# Patient Record
Sex: MALE | Race: WHITE | NOT HISPANIC OR LATINO | Employment: FULL TIME | URBAN - METROPOLITAN AREA
[De-identification: names, ages, dates, MRNs, and addresses within clinical notes are randomized per-mention and may not be internally consistent; named-entity substitution may affect disease eponyms.]

---

## 2017-12-29 PROBLEM — Z12.11 SCREEN FOR COLON CANCER: Status: ACTIVE | Noted: 2017-12-29

## 2018-10-08 PROBLEM — Z00.00 HEALTHCARE MAINTENANCE: Status: ACTIVE | Noted: 2018-10-08

## 2019-01-07 PROBLEM — Z00.00 HEALTHCARE MAINTENANCE: Status: RESOLVED | Noted: 2018-10-08 | Resolved: 2019-01-07

## 2019-04-01 ENCOUNTER — PATIENT OUTREACH (OUTPATIENT)
Dept: ADMINISTRATIVE | Facility: HOSPITAL | Age: 64
End: 2019-04-01

## 2020-04-19 ENCOUNTER — HISTORICAL (OUTPATIENT)
Dept: ADMINISTRATIVE | Facility: HOSPITAL | Age: 65
End: 2020-04-19

## 2020-04-19 LAB
ADENOVIRUS: NOT DETECTED
BORDETELLA PARAPERTUSSIS (IS1001): NOT DETECTED
CHLAMYDIA PNEUMONIAE: NOT DETECTED
CORONAVIRUS 229E, COMMON COLD VIRUS: NOT DETECTED
CORONAVIRUS HKU1, COMMON COLD VIRUS: NOT DETECTED
CORONAVIRUS NL63, COMMON COLD VIRUS: NOT DETECTED
CORONAVIRUS OC43, COMMON COLD VIRUS: NOT DETECTED
FLUBV RNA NPH QL NAA+NON-PROBE: NOT DETECTED
HPIV1 RNA NPH QL NAA+NON-PROBE: NOT DETECTED
HPIV2 RNA NPH QL NAA+NON-PROBE: NOT DETECTED
HPIV3 RNA NPH QL NAA+NON-PROBE: NOT DETECTED
HPIV4 RNA NPH QL NAA+NON-PROBE: NOT DETECTED
HUMAN METAPNEUMOVIRUS: NOT DETECTED
INFLUENZA A, MOLECULAR: NOT DETECTED
MYCOPLASMA PNEUMONIAE: NOT DETECTED
RESPIRATORY PANEL CONTROL: NORMAL
RSV RNA NPH QL NAA+NON-PROBE: NOT DETECTED
RV+EV RNA NPH QL NAA+NON-PROBE: NOT DETECTED

## 2020-04-21 LAB — SARS-COV-2 RNA RESP QL NAA+PROBE: NOT DETECTED

## 2021-05-06 ENCOUNTER — PATIENT MESSAGE (OUTPATIENT)
Dept: RESEARCH | Facility: HOSPITAL | Age: 66
End: 2021-05-06

## 2021-07-01 ENCOUNTER — PATIENT MESSAGE (OUTPATIENT)
Dept: ADMINISTRATIVE | Facility: OTHER | Age: 66
End: 2021-07-01

## 2022-02-10 ENCOUNTER — PATIENT MESSAGE (OUTPATIENT)
Dept: ADMINISTRATIVE | Facility: HOSPITAL | Age: 67
End: 2022-02-10

## 2022-06-15 PROBLEM — D64.89 OTHER SPECIFIED ANEMIAS: Status: ACTIVE | Noted: 2022-06-15

## 2022-06-15 PROBLEM — I49.3 PVC'S (PREMATURE VENTRICULAR CONTRACTIONS): Status: ACTIVE | Noted: 2022-06-15

## 2022-06-15 PROBLEM — E80.6 HYPERBILIRUBINEMIA: Status: ACTIVE | Noted: 2022-06-15

## 2022-06-15 PROBLEM — E88.09 HYPOALBUMINEMIA: Status: ACTIVE | Noted: 2022-06-15

## 2022-06-15 PROBLEM — Z87.898 HISTORY OF PALPITATIONS: Status: ACTIVE | Noted: 2022-06-15

## 2022-06-15 PROBLEM — D68.9 COAGULOPATHY: Status: ACTIVE | Noted: 2022-06-15

## 2022-09-30 ENCOUNTER — TELEPHONE (OUTPATIENT)
Dept: TRANSPLANT | Facility: CLINIC | Age: 67
End: 2022-09-30
Payer: COMMERCIAL

## 2022-09-30 NOTE — TELEPHONE ENCOUNTER
Referral received from Aruna DUDLEY     Patient with Alcohol . MELD 23  ICD-10:  k74.60  Referred for liver transplant for CONSULT     Referral completed and forwarded to Transplant Financial Services.        Insurance: Epic   PRIMARY:   ID:  Contact #     SECONDARY:   ID:  Contact #

## 2022-09-30 NOTE — TELEPHONE ENCOUNTER
----- Message from OCTAVIA Canseco sent at 9/30/2022  8:54 AM CDT -----  Good Morning Genie,     I need to try to get this gentleman in with transplant ASAP. Labs drawn yesterday showed a MELD of 23. I spoke to him this morning and he agrees to be evaluated. Thanks for your help and I hope you are enjoying this beautiful weather!!    Concepcion Miranda PA-C

## 2022-09-30 NOTE — LETTER
September 30, 2022    Juan Carlos Yoo  [address]  [facility] [address]          Dear Juan Carlos Yoo:    Your doctor has referred you to the [facility] for liver transplant consideration.  Your demographic and insurance information have been forwarded to our financial counselor for insurance clearance.  You will be contacted by our office once your insurance company has approved a transplant consult and/or evaluation for you. An initial appointment will then be scheduled for you.     We look forward to seeing you soon. If you have any further questions, please contact us at [phone].       Sincerely,        [facility]   [address] [place] [id number] [phone]

## 2022-09-30 NOTE — TELEPHONE ENCOUNTER
Call placed to pt and spouse. No answer on pt numbers, unable to lvm. LVM on spouses phone. Requested call back re appts.   Letter to be mailed.

## 2022-10-06 ENCOUNTER — TELEPHONE (OUTPATIENT)
Dept: TRANSPLANT | Facility: CLINIC | Age: 67
End: 2022-10-06
Payer: COMMERCIAL

## 2022-10-06 NOTE — TELEPHONE ENCOUNTER
----- Message from Meche Escamilla sent at 10/6/2022  1:04 PM CDT -----      Called pt to bishnu an appt, but there was no answer and unable to LVM, mail box is full.    Called pt So, Vivian, at [phone], but there was no answer.     .

## 2022-10-13 ENCOUNTER — TELEPHONE (OUTPATIENT)
Dept: TRANSPLANT | Facility: CLINIC | Age: 67
End: 2022-10-13
Payer: COMMERCIAL

## 2022-10-13 NOTE — TELEPHONE ENCOUNTER
----- Message from Meche Escamilla sent at 10/13/2022 10:42 AM CDT -----    Called and sp to pt; appt bishnu'ed for 10/17.  .

## 2022-10-16 DIAGNOSIS — Z76.82 ORGAN TRANSPLANT CANDIDATE: ICD-10-CM

## 2022-10-16 DIAGNOSIS — K74.60 HEPATIC CIRRHOSIS, UNSPECIFIED HEPATIC CIRRHOSIS TYPE, UNSPECIFIED WHETHER ASCITES PRESENT: Primary | ICD-10-CM

## 2022-10-16 NOTE — PROGRESS NOTES
[facility] Consultation Note    Reason for Consult:  Diagnoses of Alcoholic cirrhosis of liver without ascites, Hepatic cirrhosis, unspecified hepatic cirrhosis type, unspecified whether ascites present, and Organ transplant candidate were pertinent to this visit.    PCP: Nyla Rios   [address] / [place] [address]    HPI:  This is a 67 y.o. male here for evaluation of: liver transplant referral for decomp cirrhosis with fatigue, hepatic encephalopathy, thrombocytopenia, eso varices wo bleeding.  EGD and colonoscopy were supposed to be done 2 months ago, he was prepped, and anesthesia did not want to go forward, he was sent to ECHO: A Fib cardioverted 3 times did not stop the arrhythmia, but in the ER he got a shot, and heart converted to sins rhythm, no more A Fib since.     ECHO 6/15/22:   The left ventricle is normal in size with normal systolic function.  The estimated ejection fraction is 55-60%.  The quantitatively derived ejection fraction is 56%.  A diastolic pattern consistent with atrial fibrillation observed.  Severe left atrial enlargement.  Mild right ventricular enlargement with normal right ventricular systolic function.  Severe right atrial enlargement.  Mild aortic regurgitation.  Mild mitral regurgitation.  The estimated PA systolic pressure is 53 mmHg.  There is moderate pulmonary hypertension.  Mild tricuspid regurgitation.  Intermediate central venous pressure (8 mmHg).       OCTAVIA Miranda note 10/6/22:  He has mentioned drinking heavily several weeks before the blood work due to rectal pressure, but he needs to be followed by transplant at this point due to the severity of his liver disease.     MELD was 23 on 9/29/22.  17 today.      MELD-Na score: 17 at 10/17/2022  2:30 PM  MELD score: 16 at 10/17/2022  2:30 PM  Calculated from:  Serum Creatinine: 1.2 mg/dL at 10/17/2022  2:30 PM  Serum Sodium: 136 mmol/L at 10/17/2022  2:30 PM  Total Bilirubin: 2.7 mg/dL at  10/17/2022  2:30 PM  INR(ratio): 1.4 at 10/17/2022  2:30 PM  Age: 67 years     US abd 7/1/22:  The liver is normal in size measuring 14.2 cm in length. The liver is heterogeneous in echogenicity and nodular in contour compatible with cirrhosis.  No focal hepatic lesions are identified.  The main portal vein appears patent with antegrade flow.  No intrahepatic biliary ductal dilatation is detected. The common bile duct is within normal limits at 0.4 cm.  The gallbladder is absent.  The right kidney is normal in size measuring 12.2 cm with no evidence of hydronephrosis.  A possible 0.7 cm nonobstructing right renal calculus is noted in the superior pole.  A 0.9 cm cyst is noted in the superior pole of the right kidney.  The spleen is enlarged measuring 13.1 x 5.5 cm.     Impression:   1. Heterogeneous nodular liver compatible with cirrhosis.  2. Cholecystectomy.  3. Splenomegaly.  4. Possible 0.7 cm nonobstructing right renal calculus.  5. Right renal cyst.      Elevated liver enzymes: Yes  Abnormal imaging: Yes  Cirrhosis: Yes  Hepatitis C: No  Hepatitis B: No  Fatty liver: No  Encephalopathy: Yes  Post-hospital discharge: No  Symptoms: Mild swelling in the legs.      Primary hepatic manifestations:  Fatigue:Yes  Edema:Yes  Ascites:Yes - para x 2 in the last year.  Encephalopathy:Yes  Abdominal pain:No  GI bleeds: No  Pruritus:No  Weight Changes:No  Changes in Bowel habits: No  Muscle cramps:No    Risk factors for liver disease:  No jaundice  No transfusions  No IVDU  Did not snort cocaine or similar agents  Did not live with anyone with hepatitis B or C  Sexual partner not tested  No hepatotoxic medications  No exposure to industrial toxins  Alcohol:       ROS:  Constitutional: No fevers, chills, weight changes, fatigue  ENT: No allergies, nosebleeds,   CV: No chest pain  Pulm: No cough, shortness of breath  Ophtho: No vision changes  GI/Liver: see HPI  Derm: No rash, itching  Heme: No swollen glands, bruising  MSK:  No joint pains, joint swelling  : No dysuria, hematuria, decrease in urine output  Endo: No hot or cold intolerance  Neuro: No confusion, disorientation, difficulty with sleep, memory, concentration, syncope, seizure  Psych: No anxiety, depression    Medical History:  has a past medical history of Bulging disc, Cirrhosis, Colon polyp (12/29/2017), EV (esophageal varices), Skin cancer (03/2017), and Thrombocytopenia.    Surgical History:  has a past surgical history that includes Cholecystectomy; Tonsillectomy; Vasectomy; Skin biopsy (03/2017); Hernia repair; Upper gastrointestinal endoscopy (2011); Upper gastrointestinal endoscopy (2016); Colonoscopy; and Colonoscopy (N/A, 12/29/2017).    Family History: family history includes Cancer in his maternal uncle; Heart disease in his maternal uncle; Hyperlipidemia in his brother; Ovarian cancer in his sister..     Social History:  reports that he has never smoked. He has never used smokeless tobacco. He reports that he does not currently use alcohol. He reports that he does not use drugs.    Review of patient's allergies indicates:  No Known Allergies    Current Outpatient Rx   Medication Sig Dispense Refill    ascorbic acid, vitamin C, (VITAMIN C) 500 MG tablet Take 500 mg by mouth.      cyanocobalamin (VITAMIN B-12) 100 MCG tablet Take 100 mcg by mouth once daily.      fish oil-omega-3 fatty acids 300-1,000 mg capsule Take 1 g by mouth 2 (two) times daily.      furosemide (LASIX) 40 MG tablet Take 1 tablet (40 mg total) by mouth 2 (two) times a day. 30 tablet 6    nadoloL (CORGARD) 20 MG tablet Take 1 tablet (20 mg total) by mouth every evening. 90 tablet 2    pantoprazole (PROTONIX) 40 MG tablet Take 1 tablet (40 mg total) by mouth once daily. 90 tablet 0    spironolactone (ALDACTONE) 50 MG tablet Take 1 tablet (50 mg total) by mouth once daily. 90 tablet 3    tamsulosin (FLOMAX) 0.4 mg Cap Take 1 capsule (0.4 mg total) by mouth every evening. 30 capsule 6  "      Objective Findings:    Vital Signs:  /67 (BP Location: Right arm, Patient Position: Sitting, BP Method: Medium (Automatic))   Pulse 85   Temp 97.2 °F (36.2 °C) (Tympanic)   Resp 16   Ht 6' 1" (1.854 m)   Wt 121.8 kg (268 lb 8.3 oz)   SpO2 98%   BMI 35.43 kg/m²   Body mass index is 35.43 kg/m².    Physical Exam:  General Appearance: Well appearing in no acute distress  Head:   Normocephalic, without obvious abnormality  Eyes:    No scleral icterus, EOMI  ENT: Neck supple, Lips, mucosa, and tongue normal; teeth and gums normal  Lungs: CTA bilaterally in anterior and posterior fields, no wheezes, no crackles.  Heart:  Regular rate and rhythm, S1, S2 normal, no murmurs heard  Abdomen: Soft, non tender, non distended with positive bowel sounds in all four quadrants. No hepatosplenomegaly, ascites, or mass  Extremities: 2+ pulses, no clubbing, cyanosis or edema  Skin: No rash  Neurologic: CN II-XII intact, alert, oriented x 3. No asterixis      Labs:  Lab Results   Component Value Date    WBC 5.32 10/17/2022    HGB 12.1 (L) 10/17/2022    HCT 37.4 (L) 10/17/2022     (L) 10/17/2022    CHOL 178 04/01/2022    TRIG 73 04/01/2022    HDL 22 (L) 04/01/2022    INR 1.4 (H) 10/17/2022    INR 1.4 (H) 10/17/2022    CREATININE 1.2 10/17/2022    BUN 15 10/17/2022    BILITOT 2.7 (H) 10/17/2022    ALT 17 10/17/2022    AST 41 (H) 10/17/2022    ALKPHOS 60 10/17/2022     10/17/2022    K 3.6 10/17/2022    CL 96 10/17/2022    CO2 34 (H) 10/17/2022    PSA 0.18 09/29/2022    HGBA1C 4.9 10/01/2018    AFP 5.7 10/17/2022       Imaging:       Endoscopy:      Assessment:  1. Alcoholic cirrhosis of liver without ascites    2. Hepatic cirrhosis, unspecified hepatic cirrhosis type, unspecified whether ascites present    3. Organ transplant candidate    Decomp. cirrhosis, with HE, ascites (controlled), edema (better now), mild icterus, moderate Pulm Hypertension, Drank since he was in high school, but had fallen out of bed " in Janury 2023, had severe abd pain, and had passed out when he was taken to [facility], [place].  Stopped drinking alcohol after the fall, then drank 4 weeks ago (mid- Sept 2022) for 2 weeks.  Has not had any alcohol for 2 weeks.  MELD was 23 on 9/29/22.  Improving with MELD 17 today 10/17/22.   AFP normal. Creat normal.      Recommendations:  -  Labs every 3 months, starting :  CBC, CMP, PT INR  -  Ultrasound of abdomen and AFP every 6 months, next due in 1/2023   -  Cardiology appt (heart failure) for pulm hypertension.   -  Low salt in the diet, avoid canned, bottled and processed foods.  -  Continue current meds  -  Avoid alcohol, smoking, sedatives and meds with codeine.  -  Use sliding scale for lactulose as follows:  Take 1 cup 30 mL at 8AM.  If no stool by 12 noon, take 30 mL more of lactulose.  If <2 stools by 3 PM, take 30 mL more of lactulose.  Goal is 3-4 soft stools daily.   -  Avoid high intake of Tylenol (more than 4 extra-strength pills in one day)  -  Call us if any bleeding, fevers, confusion, disorientation occur  -  Endoscopy: to be done here in GI.   -  Transplant option discussed, will evaluate when MELD >15.  -  Return in 2 months.       Follow up in about 2 months (around 12/17/2022).      Order summary:  Orders Placed This Encounter   Procedures    US Abdomen Limited    AFP Tumor Marker    CBC Auto Differential    Comprehensive Metabolic Panel    Protime-INR         Thank you so much for allowing me to participate in the care of Juan Carlos Coat MD

## 2022-10-17 ENCOUNTER — OFFICE VISIT (OUTPATIENT)
Dept: TRANSPLANT | Facility: CLINIC | Age: 67
End: 2022-10-17
Payer: COMMERCIAL

## 2022-10-17 ENCOUNTER — LAB VISIT (OUTPATIENT)
Dept: LAB | Facility: HOSPITAL | Age: 67
End: 2022-10-17
Payer: COMMERCIAL

## 2022-10-17 VITALS
BODY MASS INDEX: 35.59 KG/M2 | OXYGEN SATURATION: 98 % | DIASTOLIC BLOOD PRESSURE: 67 MMHG | RESPIRATION RATE: 16 BRPM | TEMPERATURE: 97 F | HEART RATE: 85 BPM | HEIGHT: 73 IN | WEIGHT: 268.5 LBS | SYSTOLIC BLOOD PRESSURE: 130 MMHG

## 2022-10-17 DIAGNOSIS — Z76.82 ORGAN TRANSPLANT CANDIDATE: ICD-10-CM

## 2022-10-17 DIAGNOSIS — E88.09 HYPOALBUMINEMIA: ICD-10-CM

## 2022-10-17 DIAGNOSIS — I49.3 PVC'S (PREMATURE VENTRICULAR CONTRACTIONS): ICD-10-CM

## 2022-10-17 DIAGNOSIS — E80.6 HYPERBILIRUBINEMIA: ICD-10-CM

## 2022-10-17 DIAGNOSIS — K74.60 HEPATIC CIRRHOSIS, UNSPECIFIED HEPATIC CIRRHOSIS TYPE, UNSPECIFIED WHETHER ASCITES PRESENT: ICD-10-CM

## 2022-10-17 DIAGNOSIS — D68.9 COAGULOPATHY: ICD-10-CM

## 2022-10-17 DIAGNOSIS — R06.09 DOE (DYSPNEA ON EXERTION): ICD-10-CM

## 2022-10-17 DIAGNOSIS — K70.30 ALCOHOLIC CIRRHOSIS OF LIVER WITHOUT ASCITES: ICD-10-CM

## 2022-10-17 DIAGNOSIS — M79.89 LEG SWELLING: ICD-10-CM

## 2022-10-17 DIAGNOSIS — Z87.898 HISTORY OF PALPITATIONS: ICD-10-CM

## 2022-10-17 LAB
ABO + RH BLD: NORMAL
AFP SERPL-MCNC: 5.7 NG/ML (ref 0–8.4)
ALBUMIN SERPL BCP-MCNC: 2.7 G/DL (ref 3.5–5.2)
ALP SERPL-CCNC: 60 U/L (ref 55–135)
ALT SERPL W/O P-5'-P-CCNC: 17 U/L (ref 10–44)
AMPHET+METHAMPHET UR QL: NEGATIVE
ANION GAP SERPL CALC-SCNC: 6 MMOL/L (ref 8–16)
AST SERPL-CCNC: 41 U/L (ref 10–40)
BARBITURATES UR QL SCN>200 NG/ML: NEGATIVE
BASOPHILS # BLD AUTO: 0.05 K/UL (ref 0–0.2)
BASOPHILS NFR BLD: 0.9 % (ref 0–1.9)
BENZODIAZ UR QL SCN>200 NG/ML: NEGATIVE
BILIRUB DIRECT SERPL-MCNC: 1.4 MG/DL (ref 0.1–0.3)
BILIRUB SERPL-MCNC: 2.7 MG/DL (ref 0.1–1)
BILIRUB UR QL STRIP: NEGATIVE
BLD GP AB SCN CELLS X3 SERPL QL: NORMAL
BUN SERPL-MCNC: 15 MG/DL (ref 8–23)
BZE UR QL SCN: NEGATIVE
CALCIUM SERPL-MCNC: 8.4 MG/DL (ref 8.7–10.5)
CANNABINOIDS UR QL SCN: NEGATIVE
CHLORIDE SERPL-SCNC: 96 MMOL/L (ref 95–110)
CLARITY UR REFRACT.AUTO: CLEAR
CO2 SERPL-SCNC: 34 MMOL/L (ref 23–29)
COLOR UR AUTO: YELLOW
CREAT SERPL-MCNC: 1.2 MG/DL (ref 0.5–1.4)
CREAT UR-MCNC: 52 MG/DL (ref 23–375)
DIFFERENTIAL METHOD: ABNORMAL
EOSINOPHIL # BLD AUTO: 0.2 K/UL (ref 0–0.5)
EOSINOPHIL NFR BLD: 3 % (ref 0–8)
ERYTHROCYTE [DISTWIDTH] IN BLOOD BY AUTOMATED COUNT: 14.8 % (ref 11.5–14.5)
EST. GFR  (NO RACE VARIABLE): >60 ML/MIN/1.73 M^2
ETHANOL UR-MCNC: <10 MG/DL
GGT SERPL-CCNC: 54 U/L (ref 8–55)
GLUCOSE SERPL-MCNC: 94 MG/DL (ref 70–110)
GLUCOSE UR QL STRIP: NEGATIVE
HAV IGG SER QL IA: REACTIVE
HBV CORE AB SERPL QL IA: NORMAL
HBV SURFACE AG SERPL QL IA: NORMAL
HCT VFR BLD AUTO: 37.4 % (ref 40–54)
HCV AB SERPL QL IA: NORMAL
HGB BLD-MCNC: 12.1 G/DL (ref 14–18)
HGB UR QL STRIP: NEGATIVE
IMM GRANULOCYTES # BLD AUTO: 0.03 K/UL (ref 0–0.04)
IMM GRANULOCYTES NFR BLD AUTO: 0.6 % (ref 0–0.5)
INR PPP: 1.4 (ref 0.8–1.2)
INR PPP: 1.4 (ref 0.8–1.2)
KETONES UR QL STRIP: NEGATIVE
LEUKOCYTE ESTERASE UR QL STRIP: NEGATIVE
LYMPHOCYTES # BLD AUTO: 1.4 K/UL (ref 1–4.8)
LYMPHOCYTES NFR BLD: 26.3 % (ref 18–48)
MCH RBC QN AUTO: 33.6 PG (ref 27–31)
MCHC RBC AUTO-ENTMCNC: 32.4 G/DL (ref 32–36)
MCV RBC AUTO: 104 FL (ref 82–98)
METHADONE UR QL SCN>300 NG/ML: NEGATIVE
MICROSCOPIC COMMENT: NORMAL
MONOCYTES # BLD AUTO: 0.8 K/UL (ref 0.3–1)
MONOCYTES NFR BLD: 15 % (ref 4–15)
NEUTROPHILS # BLD AUTO: 2.9 K/UL (ref 1.8–7.7)
NEUTROPHILS NFR BLD: 54.2 % (ref 38–73)
NITRITE UR QL STRIP: NEGATIVE
NRBC BLD-RTO: 0 /100 WBC
OPIATES UR QL SCN: NEGATIVE
PCP UR QL SCN>25 NG/ML: NEGATIVE
PH UR STRIP: 7 [PH] (ref 5–8)
PLATELET # BLD AUTO: 132 K/UL (ref 150–450)
PMV BLD AUTO: 11.2 FL (ref 9.2–12.9)
POTASSIUM SERPL-SCNC: 3.6 MMOL/L (ref 3.5–5.1)
PROT SERPL-MCNC: 7.3 G/DL (ref 6–8.4)
PROT UR QL STRIP: NEGATIVE
PROTHROMBIN TIME: 14 SEC (ref 9–12.5)
PROTHROMBIN TIME: 14 SEC (ref 9–12.5)
RBC # BLD AUTO: 3.6 M/UL (ref 4.6–6.2)
RBC #/AREA URNS AUTO: 0 /HPF (ref 0–4)
SODIUM SERPL-SCNC: 136 MMOL/L (ref 136–145)
SP GR UR STRIP: 1.01 (ref 1–1.03)
SQUAMOUS #/AREA URNS AUTO: 0 /HPF
TOXICOLOGY INFORMATION: NORMAL
URN SPEC COLLECT METH UR: NORMAL
WBC # BLD AUTO: 5.32 K/UL (ref 3.9–12.7)
WBC #/AREA URNS AUTO: 1 /HPF (ref 0–5)

## 2022-10-17 PROCEDURE — 3288F PR FALLS RISK ASSESSMENT DOCUMENTED: ICD-10-PCS | Mod: CPTII,S$GLB,TXP, | Performed by: INTERNAL MEDICINE

## 2022-10-17 PROCEDURE — 82105 ALPHA-FETOPROTEIN SERUM: CPT | Mod: TXP | Performed by: INTERNAL MEDICINE

## 2022-10-17 PROCEDURE — 80307 DRUG TEST PRSMV CHEM ANLYZR: CPT | Mod: TXP | Performed by: INTERNAL MEDICINE

## 2022-10-17 PROCEDURE — 82977 ASSAY OF GGT: CPT | Mod: TXP | Performed by: INTERNAL MEDICINE

## 2022-10-17 PROCEDURE — 3288F FALL RISK ASSESSMENT DOCD: CPT | Mod: CPTII,S$GLB,TXP, | Performed by: INTERNAL MEDICINE

## 2022-10-17 PROCEDURE — 87340 HEPATITIS B SURFACE AG IA: CPT | Mod: TXP | Performed by: INTERNAL MEDICINE

## 2022-10-17 PROCEDURE — 85610 PROTHROMBIN TIME: CPT | Mod: TXP | Performed by: INTERNAL MEDICINE

## 2022-10-17 PROCEDURE — 99205 OFFICE O/P NEW HI 60 MIN: CPT | Mod: S$GLB,TXP,, | Performed by: INTERNAL MEDICINE

## 2022-10-17 PROCEDURE — 81001 URINALYSIS AUTO W/SCOPE: CPT | Mod: TXP | Performed by: INTERNAL MEDICINE

## 2022-10-17 PROCEDURE — 1126F PR PAIN SEVERITY QUANTIFIED, NO PAIN PRESENT: ICD-10-PCS | Mod: CPTII,S$GLB,TXP, | Performed by: INTERNAL MEDICINE

## 2022-10-17 PROCEDURE — 3078F PR MOST RECENT DIASTOLIC BLOOD PRESSURE < 80 MM HG: ICD-10-PCS | Mod: CPTII,S$GLB,TXP, | Performed by: INTERNAL MEDICINE

## 2022-10-17 PROCEDURE — 80053 COMPREHEN METABOLIC PANEL: CPT | Mod: TXP | Performed by: INTERNAL MEDICINE

## 2022-10-17 PROCEDURE — 99999 PR PBB SHADOW E&M-EST. PATIENT-LVL IV: CPT | Mod: PBBFAC,TXP,, | Performed by: INTERNAL MEDICINE

## 2022-10-17 PROCEDURE — 86901 BLOOD TYPING SEROLOGIC RH(D): CPT | Mod: TXP | Performed by: INTERNAL MEDICINE

## 2022-10-17 PROCEDURE — 1101F PT FALLS ASSESS-DOCD LE1/YR: CPT | Mod: CPTII,S$GLB,TXP, | Performed by: INTERNAL MEDICINE

## 2022-10-17 PROCEDURE — 1159F PR MEDICATION LIST DOCUMENTED IN MEDICAL RECORD: ICD-10-PCS | Mod: CPTII,S$GLB,TXP, | Performed by: INTERNAL MEDICINE

## 2022-10-17 PROCEDURE — 1126F AMNT PAIN NOTED NONE PRSNT: CPT | Mod: CPTII,S$GLB,TXP, | Performed by: INTERNAL MEDICINE

## 2022-10-17 PROCEDURE — 85025 COMPLETE CBC W/AUTO DIFF WBC: CPT | Mod: TXP | Performed by: INTERNAL MEDICINE

## 2022-10-17 PROCEDURE — 80321 ALCOHOLS BIOMARKERS 1OR 2: CPT | Mod: TXP | Performed by: INTERNAL MEDICINE

## 2022-10-17 PROCEDURE — 99999 PR PBB SHADOW E&M-EST. PATIENT-LVL IV: ICD-10-PCS | Mod: PBBFAC,TXP,, | Performed by: INTERNAL MEDICINE

## 2022-10-17 PROCEDURE — 86803 HEPATITIS C AB TEST: CPT | Mod: TXP | Performed by: INTERNAL MEDICINE

## 2022-10-17 PROCEDURE — 1101F PR PT FALLS ASSESS DOC 0-1 FALLS W/OUT INJ PAST YR: ICD-10-PCS | Mod: CPTII,S$GLB,TXP, | Performed by: INTERNAL MEDICINE

## 2022-10-17 PROCEDURE — 82248 BILIRUBIN DIRECT: CPT | Mod: TXP | Performed by: INTERNAL MEDICINE

## 2022-10-17 PROCEDURE — 86790 VIRUS ANTIBODY NOS: CPT | Mod: TXP | Performed by: INTERNAL MEDICINE

## 2022-10-17 PROCEDURE — 99205 PR OFFICE/OUTPT VISIT, NEW, LEVL V, 60-74 MIN: ICD-10-PCS | Mod: S$GLB,TXP,, | Performed by: INTERNAL MEDICINE

## 2022-10-17 PROCEDURE — 3075F SYST BP GE 130 - 139MM HG: CPT | Mod: CPTII,S$GLB,TXP, | Performed by: INTERNAL MEDICINE

## 2022-10-17 PROCEDURE — 3075F PR MOST RECENT SYSTOLIC BLOOD PRESS GE 130-139MM HG: ICD-10-PCS | Mod: CPTII,S$GLB,TXP, | Performed by: INTERNAL MEDICINE

## 2022-10-17 PROCEDURE — 3078F DIAST BP <80 MM HG: CPT | Mod: CPTII,S$GLB,TXP, | Performed by: INTERNAL MEDICINE

## 2022-10-17 PROCEDURE — 86706 HEP B SURFACE ANTIBODY: CPT | Mod: 91,TXP | Performed by: INTERNAL MEDICINE

## 2022-10-17 PROCEDURE — 86704 HEP B CORE ANTIBODY TOTAL: CPT | Mod: TXP | Performed by: INTERNAL MEDICINE

## 2022-10-17 PROCEDURE — 1159F MED LIST DOCD IN RCRD: CPT | Mod: CPTII,S$GLB,TXP, | Performed by: INTERNAL MEDICINE

## 2022-10-17 PROCEDURE — 36415 COLL VENOUS BLD VENIPUNCTURE: CPT | Mod: TXP | Performed by: INTERNAL MEDICINE

## 2022-10-17 NOTE — Clinical Note
Recommendations: -  Labs every 3 months, starting :  CBC, CMP, PT INR -  Ultrasound of abdomen and AFP every 6 months, next due in 1/2023  -  Cardiology appt (heart failure) for pulm hypertension.  -  Low salt in the diet, avoid canned, bottled and processed foods. -  Continue current meds -  Avoid alcohol, smoking, sedatives and meds with codeine. -  Use sliding scale for lactulose as follows:  Take 1 cup 30 mL at 8AM.  If no stool by 12 noon, take 30 mL more of lactulose.  If <2 stools by 3 PM, take 30 mL more of lactulose.  Goal is 3-4 soft stools daily.  -  Avoid high intake of Tylenol (more than 4 extra-strength pills in one day) -  Call us if any bleeding, fevers, confusion, disorientation occur -  Endoscopy: to be done here in GI.  -  Transplant option discussed, will evaluate when MELD >15. -  Return in 2 months.

## 2022-10-17 NOTE — LETTER
October 17, 2022        Aruna Miranda  [address]  [place] [address]  Phone: [phone]  Fax: [phone]             [facility]  [address]  [facility] [address]  Phone: [phone]   Patient: Juan Carlos Yoo Sr.   MR Number: [id number]   YOB: 1955   Date of Visit: 10/17/2022       Dear Dr. Aruna Miranda    Thank you for referring Juan Carlos Yoo to me for evaluation. Attached you will find relevant portions of my assessment and plan of care.    If you have questions, please do not hesitate to call me. I look forward to following Juan Carlos Yoo along with you.    Sincerely,    Deanna Cota MD    Enclosure    If you would like to receive this communication electronically, please contact [email or web address] or [phone] to request Game[employer] Link access.    [employer] Link is a tool which provides read-only access to select patient information with whom you have a relationship. Its easy to use and provides real time access to review your patients record including encounter summaries, notes, results, and demographic information.    If you feel you have received this communication in error or would no longer like to receive these types of communications, please e-mail [email or web address]

## 2022-10-18 LAB
HBV SURFACE AB SER-ACNC: <3 MIU/ML
HBV SURFACE AB SER-ACNC: NORMAL M[IU]/ML

## 2022-10-21 LAB
CLINICAL BIOCHEMIST REVIEW: NORMAL
PETH 16:0/18.1 (POPETH): 60 NG/ML
PETH 16:0/18.2 (PLPETH): 14 NG/ML

## 2022-10-28 ENCOUNTER — TELEPHONE (OUTPATIENT)
Dept: TRANSPLANT | Facility: CLINIC | Age: 67
End: 2022-10-28
Payer: COMMERCIAL

## 2022-10-28 NOTE — TELEPHONE ENCOUNTER
Per Dr. Cota:    Transplant option discussed, will evaluate when MELD >15        10/17/2022   MELD-Na PELD 17       Please request financial clearance for outpatient liver transplant evaluation.  Will fax clinicals to [facility] .

## 2022-11-02 ENCOUNTER — TELEPHONE (OUTPATIENT)
Dept: TRANSPLANT | Facility: CLINIC | Age: 67
End: 2022-11-02
Payer: COMMERCIAL

## 2022-11-04 ENCOUNTER — TELEPHONE (OUTPATIENT)
Dept: TRANSPLANT | Facility: CLINIC | Age: 67
End: 2022-11-04
Payer: COMMERCIAL

## 2022-11-04 DIAGNOSIS — I27.20 PULMONARY HYPERTENSION: ICD-10-CM

## 2022-11-04 DIAGNOSIS — Z76.82 ORGAN TRANSPLANT CANDIDATE: Primary | ICD-10-CM

## 2022-11-04 NOTE — TELEPHONE ENCOUNTER
Called pt to discuss [facility] liver transplant evaluation.  Informed patient  that evaluation will take approx  2 to 3 days to complete, depending on rather additional consults.  Informed him that all testing will be performed outpatient.  Patient notified that some testing may be completed outside.  Patient informed of which tests that can be scheduled locally.  Patient voiced understanding of the need to have a caregiver present for the  and surgeon consult, as well as for the patient education.   All questions/ concerns regarding liver transplant evaluation answered/ addressed.     Patient picked December 1st and 2nd for liver transplant evaluation.  Patient needs cardiac clearance for EGD/Colonoscopy.   Patent has history of pulmonary HTN and A-fib.    Cardiology consult has been requested.

## 2022-11-04 NOTE — TELEPHONE ENCOUNTER
MELD 17   Patient has financial clearance for outpatient liver transplant evaluation.    Called the following numbers to reach patient to schedule liver transplant evaluation and discuss him needing to see cardiology for heart failure due to pulmonary HTN.  Unable to reach patient.     Contact Information   [phone] (Mobile)  Mailbox full - could not leave message  [phone] (Home Phone)   [phone] (Mobile) - Just rings and did not go to voicemail      Alternate     Vivian Henderson (Significant other)   [phone] (Mobile) -  Left message with callback number       Placed orders for heart failure / pulmonary HTN clinic. Appointment card given to transplant .   Left message on significant other's mobile phone for patient to call back.

## 2022-11-10 ENCOUNTER — TELEPHONE (OUTPATIENT)
Dept: TRANSPLANT | Facility: CLINIC | Age: 67
End: 2022-11-10
Payer: COMMERCIAL

## 2022-11-10 DIAGNOSIS — Z76.82 ORGAN TRANSPLANT CANDIDATE: Primary | ICD-10-CM

## 2022-11-10 DIAGNOSIS — I50.9 CONGESTIVE HEART FAILURE, UNSPECIFIED HF CHRONICITY, UNSPECIFIED HEART FAILURE TYPE: Primary | ICD-10-CM

## 2022-11-15 ENCOUNTER — TELEPHONE (OUTPATIENT)
Dept: TRANSPLANT | Facility: CLINIC | Age: 67
End: 2022-11-15
Payer: COMMERCIAL

## 2022-11-15 NOTE — TELEPHONE ENCOUNTER
----- Message from Meche Escamilla sent at 11/15/2022 12:53 PM CST -----  Regarding: FW: Scheduling Assistance    Returned call to pt, but there was no answer and unable to LVM. Pt is calling about [facility] appts for 12/1 and 12/2.  .  ----- Message -----  From: Jose Melara  Sent: 11/15/2022  12:44 PM CST  To: [facility] Pre-Liver Transplant Non-Clinical  Subject: Scheduling Assistance                            Patient called in to speak with staff in regards to scheduling some testing he was informed he needs. He mentions a series of testing needing to be scheduled.        Contact: [phone]

## 2022-11-22 DIAGNOSIS — Z76.82 ORGAN TRANSPLANT CANDIDATE: Primary | ICD-10-CM

## 2022-11-29 ENCOUNTER — LAB VISIT (OUTPATIENT)
Dept: LAB | Facility: HOSPITAL | Age: 67
End: 2022-11-29
Attending: INTERNAL MEDICINE
Payer: COMMERCIAL

## 2022-11-29 ENCOUNTER — OFFICE VISIT (OUTPATIENT)
Dept: TRANSPLANT | Facility: CLINIC | Age: 67
End: 2022-11-29
Payer: COMMERCIAL

## 2022-11-29 VITALS
DIASTOLIC BLOOD PRESSURE: 81 MMHG | WEIGHT: 274.69 LBS | SYSTOLIC BLOOD PRESSURE: 145 MMHG | HEART RATE: 88 BPM | HEIGHT: 73 IN | BODY MASS INDEX: 36.41 KG/M2

## 2022-11-29 DIAGNOSIS — I48.19 PERSISTENT ATRIAL FIBRILLATION: ICD-10-CM

## 2022-11-29 DIAGNOSIS — Z76.82 ORGAN TRANSPLANT CANDIDATE: ICD-10-CM

## 2022-11-29 DIAGNOSIS — R09.89 PULMONARY HYPERINFLATION: ICD-10-CM

## 2022-11-29 DIAGNOSIS — G47.30 SLEEP APNEA, UNSPECIFIED TYPE: ICD-10-CM

## 2022-11-29 DIAGNOSIS — I50.9 CONGESTIVE HEART FAILURE, UNSPECIFIED HF CHRONICITY, UNSPECIFIED HEART FAILURE TYPE: ICD-10-CM

## 2022-11-29 DIAGNOSIS — I27.20 PULMONARY HYPERTENSION: Primary | ICD-10-CM

## 2022-11-29 LAB
ALBUMIN SERPL BCP-MCNC: 2.8 G/DL (ref 3.5–5.2)
ALP SERPL-CCNC: 53 U/L (ref 55–135)
ALT SERPL W/O P-5'-P-CCNC: 11 U/L (ref 10–44)
ANION GAP SERPL CALC-SCNC: 8 MMOL/L (ref 8–16)
AST SERPL-CCNC: 21 U/L (ref 10–40)
BILIRUB SERPL-MCNC: 2.2 MG/DL (ref 0.1–1)
BNP SERPL-MCNC: 237 PG/ML (ref 0–99)
BUN SERPL-MCNC: 14 MG/DL (ref 8–23)
CALCIUM SERPL-MCNC: 8.7 MG/DL (ref 8.7–10.5)
CHLORIDE SERPL-SCNC: 100 MMOL/L (ref 95–110)
CO2 SERPL-SCNC: 30 MMOL/L (ref 23–29)
CREAT SERPL-MCNC: 1 MG/DL (ref 0.5–1.4)
EST. GFR  (NO RACE VARIABLE): >60 ML/MIN/1.73 M^2
GLUCOSE SERPL-MCNC: 101 MG/DL (ref 70–110)
MAGNESIUM SERPL-MCNC: 1.4 MG/DL (ref 1.6–2.6)
POTASSIUM SERPL-SCNC: 3.5 MMOL/L (ref 3.5–5.1)
PROT SERPL-MCNC: 7.2 G/DL (ref 6–8.4)
SODIUM SERPL-SCNC: 138 MMOL/L (ref 136–145)
TSH SERPL DL<=0.005 MIU/L-ACNC: 1.93 UIU/ML (ref 0.4–4)

## 2022-11-29 PROCEDURE — 3077F SYST BP >= 140 MM HG: CPT | Mod: CPTII,S$GLB,TXP, | Performed by: INTERNAL MEDICINE

## 2022-11-29 PROCEDURE — 99999 PR PBB SHADOW E&M-EST. PATIENT-LVL V: ICD-10-PCS | Mod: PBBFAC,TXP,, | Performed by: INTERNAL MEDICINE

## 2022-11-29 PROCEDURE — 36415 COLL VENOUS BLD VENIPUNCTURE: CPT | Mod: TXP | Performed by: INTERNAL MEDICINE

## 2022-11-29 PROCEDURE — 80053 COMPREHEN METABOLIC PANEL: CPT | Mod: TXP | Performed by: INTERNAL MEDICINE

## 2022-11-29 PROCEDURE — 3008F PR BODY MASS INDEX (BMI) DOCUMENTED: ICD-10-PCS | Mod: CPTII,S$GLB,TXP, | Performed by: INTERNAL MEDICINE

## 2022-11-29 PROCEDURE — 83880 ASSAY OF NATRIURETIC PEPTIDE: CPT | Mod: 91,TXP | Performed by: INTERNAL MEDICINE

## 2022-11-29 PROCEDURE — 3288F FALL RISK ASSESSMENT DOCD: CPT | Mod: CPTII,S$GLB,TXP, | Performed by: INTERNAL MEDICINE

## 2022-11-29 PROCEDURE — 83880 ASSAY OF NATRIURETIC PEPTIDE: CPT | Mod: TXP | Performed by: INTERNAL MEDICINE

## 2022-11-29 PROCEDURE — 99999 PR PBB SHADOW E&M-EST. PATIENT-LVL V: CPT | Mod: PBBFAC,TXP,, | Performed by: INTERNAL MEDICINE

## 2022-11-29 PROCEDURE — 1126F PR PAIN SEVERITY QUANTIFIED, NO PAIN PRESENT: ICD-10-PCS | Mod: CPTII,S$GLB,TXP, | Performed by: INTERNAL MEDICINE

## 2022-11-29 PROCEDURE — 1159F PR MEDICATION LIST DOCUMENTED IN MEDICAL RECORD: ICD-10-PCS | Mod: CPTII,S$GLB,TXP, | Performed by: INTERNAL MEDICINE

## 2022-11-29 PROCEDURE — 99205 OFFICE O/P NEW HI 60 MIN: CPT | Mod: S$GLB,TXP,, | Performed by: INTERNAL MEDICINE

## 2022-11-29 PROCEDURE — 1101F PT FALLS ASSESS-DOCD LE1/YR: CPT | Mod: CPTII,S$GLB,TXP, | Performed by: INTERNAL MEDICINE

## 2022-11-29 PROCEDURE — 3079F DIAST BP 80-89 MM HG: CPT | Mod: CPTII,S$GLB,TXP, | Performed by: INTERNAL MEDICINE

## 2022-11-29 PROCEDURE — 84443 ASSAY THYROID STIM HORMONE: CPT | Mod: TXP | Performed by: INTERNAL MEDICINE

## 2022-11-29 PROCEDURE — 83735 ASSAY OF MAGNESIUM: CPT | Mod: TXP | Performed by: INTERNAL MEDICINE

## 2022-11-29 PROCEDURE — 3077F PR MOST RECENT SYSTOLIC BLOOD PRESSURE >= 140 MM HG: ICD-10-PCS | Mod: CPTII,S$GLB,TXP, | Performed by: INTERNAL MEDICINE

## 2022-11-29 PROCEDURE — 1101F PR PT FALLS ASSESS DOC 0-1 FALLS W/OUT INJ PAST YR: ICD-10-PCS | Mod: CPTII,S$GLB,TXP, | Performed by: INTERNAL MEDICINE

## 2022-11-29 PROCEDURE — 99205 PR OFFICE/OUTPT VISIT, NEW, LEVL V, 60-74 MIN: ICD-10-PCS | Mod: S$GLB,TXP,, | Performed by: INTERNAL MEDICINE

## 2022-11-29 PROCEDURE — 3008F BODY MASS INDEX DOCD: CPT | Mod: CPTII,S$GLB,TXP, | Performed by: INTERNAL MEDICINE

## 2022-11-29 PROCEDURE — 1126F AMNT PAIN NOTED NONE PRSNT: CPT | Mod: CPTII,S$GLB,TXP, | Performed by: INTERNAL MEDICINE

## 2022-11-29 PROCEDURE — 1159F MED LIST DOCD IN RCRD: CPT | Mod: CPTII,S$GLB,TXP, | Performed by: INTERNAL MEDICINE

## 2022-11-29 PROCEDURE — 3079F PR MOST RECENT DIASTOLIC BLOOD PRESSURE 80-89 MM HG: ICD-10-PCS | Mod: CPTII,S$GLB,TXP, | Performed by: INTERNAL MEDICINE

## 2022-11-29 PROCEDURE — 3288F PR FALLS RISK ASSESSMENT DOCUMENTED: ICD-10-PCS | Mod: CPTII,S$GLB,TXP, | Performed by: INTERNAL MEDICINE

## 2022-11-29 NOTE — LETTER
November 29, 2022        Aruna Miranda  [address]  [place] [address]  Phone: [phone]  Fax: [phone]             [id number]  [address]  [place] [address]  Phone: [phone]   Patient: Juan Carlos Yoo Sr.   MR Number: [id number]   YOB: 1955   Date of Visit: 11/29/2022       Dear Dr. Aruna Miranda    Thank you for referring Juan Carlos Yoo to me for evaluation. Attached you will find relevant portions of my assessment and plan of care.    If you have questions, please do not hesitate to call me. I look forward to following Juan Carlos Yoo along with you.    Sincerely,    Fortunato Shaw MD    Enclosure    If you would like to receive this communication electronically, please contact [email or web address] or [phone] to request ExploraMed Link access.    [employer] Link is a tool which provides read-only access to select patient information with whom you have a relationship. Its easy to use and provides real time access to review your patients record including encounter summaries, notes, results, and demographic information.    If you feel you have received this communication in error or would no longer like to receive these types of communications, please e-mail [email or web address]

## 2022-11-29 NOTE — PROGRESS NOTES
Subjective:    Patient ID:  Juan Carlos Yoo Sr. is a 67 y.o. male who presents for cardiac evaluation as part of his liver transplant evaluation.    68 YO M w/ PMH of alcoholic cirrhosis, ascites, thrombocytopenia, and esophageal varices without bleeding, and atrial fibrillation. As part of his workup he had a TTE done which demonstrated possible PH with RVSP of 53 mmHg. Given these findings he was referred to us for further evaluation.    He says the 1st time he saw a cardiologist was in June of this year.  He says that he was referred because they identified atrial fibrillation on an EKG that was done prior to him getting a colonoscopy.  This was the 1st time that he had heard of atrial fibrillation, but when he saw his cardiologist in June he mentioned that in retrospect there were several episodes (approx 5 over several years) where he presented to a local emergency room with palpitations and he was given IV medications which terminated the palpitations.  He does not recall being told that these episodes were atrial fibrillation per se.    His cardiologist had ordered some additional testing which included an echocardiogram as well as Holter monitor.  Echocardiogram demonstrated the findings which were noted below, and Holter monitor demonstrated persistent atrial fibrillation.  He was supposed to follow up with the cardiologist, but he was unable to make the appointment as he was in [place] at that time.  Subsequent to this he was seen by transplant hepatology and ultimately referred here.    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  Does note occasional chest discomfort with exertion which is short-lived, as well as occasional shortness of breath with exertion.  These episodes are in intermittent and unpredictable when they come on.  He denies any palpitations, presyncope, or syncope.  He does note chronic leg swelling for which he is on Lasix.  He  denies any PND or orthopnea.    He says that no one has spoken to him about anticoagulation for his atrial fibrillation in the past.  When asked specifically about bleeding, he mentions that he has occasional bleeding from hemorrhoids, as well as easy skin bruising, but has never been admitted with severe bleeding.  He does have chronic thrombocytopenia as a consequence of his liver disease and a chronically elevated INR which tends to be about 1.5-1.7.    He endorses chronic snoring. No noted apneic episodes, never been tested for LOLIS.    TTE 6/15/22  The left ventricle is normal in size with normal systolic function.  The estimated ejection fraction is 55-60%.  The quantitatively derived ejection fraction is 56%.  A diastolic pattern consistent with atrial fibrillation observed.  Severe left atrial enlargement.  Mild right ventricular enlargement with normal right ventricular systolic function.  Severe right atrial enlargement.  Mild aortic regurgitation.  Mild mitral regurgitation.  The estimated PA systolic pressure is 53 mmHg.  There is moderate pulmonary hypertension.  Mild tricuspid regurgitation.  Intermediate central venous pressure (8 mmHg).    Review of Systems   Constitutional: Negative for chills and fever.   HENT:  Negative for hearing loss.    Eyes:  Negative for visual disturbance.   Cardiovascular:  Positive for chest pain, dyspnea on exertion and leg swelling. Negative for irregular heartbeat, orthopnea, palpitations, paroxysmal nocturnal dyspnea and syncope.   Respiratory:  Negative for cough and shortness of breath.    Musculoskeletal:  Negative for muscle weakness.   Gastrointestinal:  Negative for diarrhea, nausea and vomiting.   Neurological:  Negative for focal weakness.   Psychiatric/Behavioral:  Negative for memory loss.       Objective:     Vitals:    11/29/22 1307   BP: (!) 145/81   Pulse: 88      Physical Exam  Vitals reviewed.   Constitutional:       General: He is not in acute  distress.  HENT:      Head: Atraumatic.   Eyes:      Extraocular Movements: Extraocular movements intact.   Cardiovascular:      Rate and Rhythm: Normal rate. Rhythm irregular.      Heart sounds: Normal heart sounds.      Comments: JVP 8 cm.  Pulmonary:      Breath sounds: Normal breath sounds.   Abdominal:      Palpations: Abdomen is soft.      Tenderness: There is no abdominal tenderness.   Musculoskeletal:         General: Normal range of motion.      Right lower leg: Edema present.      Left lower leg: Edema present.   Neurological:      General: No focal deficit present.      Mental Status: He is alert and oriented to person, place, and time.         Assessment:       1. Pulmonary hypertension    2. Organ transplant candidate    3. Persistent atrial fibrillation    4. Sleep apnea, unspecified type    5. Pulmonary hyperinflation         Plan:       Pre Liver transplant evaluation  Possible PH  ?HFpEF  - As part of his liver transplant evaluation he had an echo with findings as noted above. Possible PH. Of note he was in AF and he has severe LA enlargement with SIM 54.  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 Of concern also is his exertional chest pain.  - Initially scheduled to have DSE, but this has poor predictive value in cirrhotic patients. As such I am referring him to interventional cardiology for combined coronary angiogram and RHC to evaluate for CAD as well as PH.    Persistent atrial fibrillation  [id number] 1-2  - Has persistent AF diagnosed in June on holter and TTE  - Currently rate controlled with nadolol which he is on for non bleeding varices  - [id number] of 2 (Age +/- HTN as BP is elevated despite being on spironolactone)  - Will reach out to his liver team with regards to thoughts on AC. His stroke risk is elevated but he also has varices, hemorrhoids,and thrombocytopenia from liver disease  - Will  refer to EP, will defer to them on ablation or antiarrhythmics    Will bring back to clinic in 6 weeks after completion of bicath. Will ultimately need to establish care with general cardiology for continued CV health, and will decide on where he would like to follow after the workup is completed.

## 2022-11-30 ENCOUNTER — TELEPHONE (OUTPATIENT)
Dept: TRANSPLANT | Facility: CLINIC | Age: 67
End: 2022-11-30
Payer: COMMERCIAL

## 2022-11-30 LAB — NT-PROBNP SERPL IA-MCNC: 282 PG/ML

## 2022-11-30 NOTE — LETTER
November 30, 2022    Juan Carlos Fredo  [address]  [facility] [address]        Dear Juan Carlos Yoo  MRN: [id number]    Securing your caregivers is one of your most important actions during your transplant work-up. Their care and support are so vital, you can only be transplanted if you have reliable caregivers.     What You Need to Know:   Due to current Covid restrictions, one adult caregiver-s must meet with the transplant  during your work-up. The second caregiver should be available by phone. We need to make sure that both caregivers are very clear about what is expected of them.    Why are my caregivers so important?  Your caregivers play a major role before, during and especially after your transplant. They help you do things that you physically cannot do as you get better. They support you mentally and emotionally as you handle the ups and downs of the transplant process.   They also transport you to your follow-up appointments during your post-transplant recovery.    How many caregivers do I need?  You must have at least two adult caregivers:   Primary caregiver - the main person to stay with you, help you and coordinate other caregivers  AND   Secondary caregiver - someone who is committed to serving as a reliable back-up caregiver for you    We strongly recommend that you have a third caregiver to also help in case they are needed. The more caregivers you have, the easier it will be for your primary caregiver to call on back-ups when needed.    Who can be my caregiver?  Your caregivers may be a spouse, partner, parent, adult family member or close friend, or some combination of these persons. Note: Your caregiver cannot be home health or a random person you hire. Caregivers must be reliable and committed. They must be able to provide assistance and be available with short notice, as time of transplant typically cannot be determined ahead of time.    How long do my caregivers need to help me?  One of your  two adult caregivers must be able to be with you and help you 24 hours a day, seven days a week, for at least three months - or longer as needed, until your doctor says you can be alone.    What do my caregivers agree to do for me?  For at least three months - or longer as needed, your caregivers agree to:    Care for you 24-hours a day, seven days a week   Stay locally in the [place] area if you live more than 1-hour away, or if recommended by the transplant team  Transporting you where you to need to go before and after transplant. This includes transporting you for:  All needed clinic/hospital visits, labs and procedures  Emergencies  Be available to you as needed while you are in the hospital for your transplant  Be with you for all teaching in the hospital prior to discharge  Stay actively involved with all aspects of your care   Learn what symptoms to look for before and after transplant  Use good judgment about any complications  Tell the transplant team right away if you have any concerns or health changes before and after transplant  Learn your medicines; Make sure you are taking them the right way and at the right time, on the right days  Be a strong, stable support for you to help you  [place] during tough emotional times  Communicate directly with the health care team members  Stay sober and drug-free  Take their own medications on time  Follow the advice of your transplant team, including mental health and social work recommendations  Be able to take off from work/school without worrying about unpaid and/or missed work/school days  Have back-up caregiver plans for the unexpected times when they cannot care for you  Take breaks and bring in back-up caregivers when needed  Coordinate caregiver plan schedule - who is coming when and for how many days each     Thank you for choosing us as your transplant center. We are committed to providing you with excellent care. We want the best for you!     Be sure to  bring your primary  adult caregiver with you for your workup appointments.    Sincerely,  Your Liver Transplant Team  [facility]  [facility] [place] [address]  [phone]

## 2022-11-30 NOTE — TELEPHONE ENCOUNTER
Patient called to confirm  appointments for [facility] [place] when he stated that he does not have a caregiver and unable to have him come at this time.  Patient rescheduled to 12/22-12/23 as request.  Patient states that he will have caregiver available during that time. Notifications done.

## 2022-12-01 ENCOUNTER — TELEPHONE (OUTPATIENT)
Dept: TRANSPLANT | Facility: CLINIC | Age: 67
End: 2022-12-01
Payer: COMMERCIAL

## 2022-12-01 NOTE — TELEPHONE ENCOUNTER
Patient notified via phone of the upcoming appts.  Pt verified he can make it to these appointments.  An appointment reminder is being mailed to patient.

## 2022-12-05 ENCOUNTER — TELEPHONE (OUTPATIENT)
Dept: TRANSPLANT | Facility: CLINIC | Age: 67
End: 2022-12-05
Payer: COMMERCIAL

## 2022-12-05 NOTE — TELEPHONE ENCOUNTER
Returned patient's phone call.  Patient wanted to review his upcoming appts.  Answered all questions.

## 2022-12-12 NOTE — PROGRESS NOTES
[facility] Follow-up Note    Reason for Visit:  There were no encounter diagnoses.    PCP: Nyla Rios       HPI:  This is a 67 y.o. male here for evaluation of: liver transplant referral for decomp cirrhosis with fatigue, hepatic encephalopathy, thrombocytopenia, eso varices wo bleeding.  EGD and colonoscopy were supposed to be done 2 months ago, he was prepped, and anesthesia did not want to go forward, he was sent to ECHO:  A Fib cardioverted 3 times did not stop the arrhythmia, but in the ER he got a shot, and heart converted to sins rhythm, no more A Fib since.     ECHO 6/15/22:   The left ventricle is normal in size with normal systolic function.  The estimated ejection fraction is 55-60%.  The quantitatively derived ejection fraction is 56%.  A diastolic pattern consistent with atrial fibrillation observed.  Severe left atrial enlargement.  Mild right ventricular enlargement with normal right ventricular systolic function.  Severe right atrial enlargement.  Mild aortic regurgitation.  Mild mitral regurgitation.  The estimated PA systolic pressure is 53 mmHg.  There is moderate pulmonary hypertension.  Mild tricuspid regurgitation.  Intermediate central venous pressure (8 mmHg).       OCTAVIA Miranda note 10/6/22:  He has mentioned drinking heavily several weeks before the blood work due to rectal pressure, but he needs to be followed by transplant at this point due to the severity of his liver disease.     MELD was 23 on 9/29/22.  17 today.      MELD-Na score: 14 at 12/13/2022 10:57 AM  MELD score: 14 at 12/13/2022 10:57 AM  Calculated from:  Serum Creatinine: 1.3 mg/dL at 12/13/2022 10:18 AM  Serum Sodium: 140 mmol/L (Using max of 137 mmol/L) at 12/13/2022 10:18 AM  Total Bilirubin: 1.9 mg/dL at 12/13/2022 10:18 AM  INR(ratio): 1.3 at 12/13/2022 10:57 AM  Age: 67 years     US abd 7/1/22:  The liver is normal in size measuring 14.2 cm in length. The liver is heterogeneous in echogenicity and  nodular in contour compatible with cirrhosis.  No focal hepatic lesions are identified.  The main portal vein appears patent with antegrade flow.  No intrahepatic biliary ductal dilatation is detected. The common bile duct is within normal limits at 0.4 cm.  The gallbladder is absent.  The right kidney is normal in size measuring 12.2 cm with no evidence of hydronephrosis.  A possible 0.7 cm nonobstructing right renal calculus is noted in the superior pole.  A 0.9 cm cyst is noted in the superior pole of the right kidney.  The spleen is enlarged measuring 13.1 x 5.5 cm.     Impression:   1. Heterogeneous nodular liver compatible with cirrhosis.  2. Cholecystectomy.  3. Splenomegaly.  4. Possible 0.7 cm nonobstructing right renal calculus.  5. Right renal cyst.      Elevated liver enzymes: Yes  Abnormal imaging: Yes  Cirrhosis: Yes  Hepatitis C: No  Hepatitis B: No  Fatty liver: No  Encephalopathy: Yes  Post-hospital discharge: No  Symptoms: Mild swelling in the legs.      Primary hepatic manifestations:  Fatigue:Yes  Edema:Yes  Ascites:Yes - para x 2 in the last year.  Encephalopathy:Yes  Abdominal pain:No  GI bleeds: No  Pruritus:No  Weight Changes:No  Changes in Bowel habits: No  Muscle cramps:No    Risk factors for liver disease:  No jaundice  No transfusions  No IVDU  Did not snort cocaine or similar agents  Did not live with anyone with hepatitis B or C  Sexual partner not tested  No hepatotoxic medications  No exposure to industrial toxins  Alcohol:       ROS:  Constitutional: No fevers, chills, weight changes, fatigue  ENT: No allergies, nosebleeds,   CV: No chest pain  Pulm: No cough, shortness of breath  Ophtho: No vision changes  GI/Liver: see HPI  Derm: No rash, itching  Heme: No swollen glands, bruising  MSK: No joint pains, joint swelling  : No dysuria, hematuria, decrease in urine output  Endo: No hot or cold intolerance  Neuro: No confusion, disorientation, difficulty with sleep, memory,  concentration, syncope, seizure  Psych: No anxiety, depression    Medical History:  has a past medical history of Bulging disc, Cirrhosis, Colon polyp (12/29/2017), EV (esophageal varices), Skin cancer (03/2017), and Thrombocytopenia.    Surgical History:  has a past surgical history that includes Cholecystectomy; Tonsillectomy; Vasectomy; Skin biopsy (03/2017); Hernia repair; Upper gastrointestinal endoscopy (2011); Upper gastrointestinal endoscopy (2016); Colonoscopy; and Colonoscopy (N/A, 12/29/2017).    Family History: family history includes Cancer in his maternal uncle; Heart disease in his maternal uncle; Hyperlipidemia in his brother; Ovarian cancer in his sister..     Social History:  reports that he has never smoked. He has never used smokeless tobacco. He reports that he does not currently use alcohol. He reports that he does not use drugs.    Review of patient's allergies indicates:  No Known Allergies    Current Outpatient Rx   Medication Sig Dispense Refill    amLODIPine (NORVASC) 10 MG tablet       ascorbic acid, vitamin C, (VITAMIN C) 500 MG tablet Take 500 mg by mouth.      cyanocobalamin (VITAMIN B-12) 100 MCG tablet Take 100 mcg by mouth once daily.      fish oil-omega-3 fatty acids 300-1,000 mg capsule Take 1 g by mouth 2 (two) times daily.      furosemide (LASIX) 40 MG tablet Take 1 tablet (40 mg total) by mouth 2 (two) times a day. 30 tablet 6    nadoloL (CORGARD) 20 MG tablet Take 1 tablet (20 mg total) by mouth every evening. 90 tablet 2    pantoprazole (PROTONIX) 40 MG tablet Take 1 tablet (40 mg total) by mouth once daily. 90 tablet 0    spironolactone (ALDACTONE) 50 MG tablet Take 1 tablet (50 mg total) by mouth once daily. 90 tablet 3    tamsulosin (FLOMAX) 0.4 mg Cap Take 1 capsule (0.4 mg total) by mouth every evening. 30 capsule 6       Objective Findings:    Vital Signs:  /74 (BP Location: Right arm, Patient Position: Sitting, BP Method: Medium (Automatic))   Pulse 94   Temp  "97.2 °F (36.2 °C) (Oral)   Resp 16   Ht 6' 1" (1.854 m)   Wt 126.1 kg (278 lb)   SpO2 96%   BMI 36.68 kg/m²   Body mass index is 36.68 kg/m².    Physical Exam:  General Appearance: Well appearing in no acute distress  Head:   Normocephalic, without obvious abnormality  Eyes:    No scleral icterus, EOMI  ENT: Neck supple, Lips, mucosa, and tongue normal; teeth and gums normal  Lungs: CTA bilaterally in anterior and posterior fields, no wheezes, no crackles.  Heart:  Regular rate and rhythm, S1, S2 normal, no murmurs heard  Abdomen: Soft, non tender, non distended with positive bowel sounds in all four quadrants. No hepatosplenomegaly, ascites, or mass  Extremities: 2+ pulses, no clubbing, cyanosis or edema  Skin: No rash  Neurologic: CN II-XII intact, alert, oriented x 3. No asterixis      Labs:  Lab Results   Component Value Date    WBC 4.86 12/13/2022    HGB 13.8 (L) 12/13/2022    HCT 40.9 12/13/2022     (L) 12/13/2022    CHOL 178 04/01/2022    TRIG 73 04/01/2022    HDL 22 (L) 04/01/2022    INR 1.3 (H) 12/13/2022    CREATININE 1.3 12/13/2022    BUN 14 12/13/2022    BILITOT 1.9 (H) 12/13/2022    ALT 9 (L) 12/13/2022    AST 20 12/13/2022    ALKPHOS 52 (L) 12/13/2022     12/13/2022    K 3.5 12/13/2022    CL 99 12/13/2022    CO2 32 (H) 12/13/2022    TSH 2.208 12/13/2022    PSA 0.18 09/29/2022    HGBA1C 4.5 12/13/2022    AFP 5.7 10/17/2022     MELD-Na score: 14 at 12/13/2022 10:57 AM  MELD score: 14 at 12/13/2022 10:57 AM  Calculated from:  Serum Creatinine: 1.3 mg/dL at 12/13/2022 10:18 AM  Serum Sodium: 140 mmol/L (Using max of 137 mmol/L) at 12/13/2022 10:18 AM  Total Bilirubin: 1.9 mg/dL at 12/13/2022 10:18 AM  INR(ratio): 1.3 at 12/13/2022 10:57 AM  Age: 67 years     Imaging:       Endoscopy:      Assessment:  No diagnosis found.  Decomp. cirrhosis, with HE, ascites (controlled), edema (better now), mild icterus, moderate Pulm Hypertension, Drank since he was in high school, but had fallen out of " bed in Janury 2023, had severe abd pain, and had passed out when he was taken to [facility] [place].  Stopped drinking alcohol after the fall, then drank 4 weeks ago (mid- Sept 2022) for 2 weeks.  Has not had any alcohol for 2 weeks.  MELD was 23 on 9/29/22.  Improving with MELD 14 today 12/13/22.     AFP normal. Creat normal.      Recommendations:  -  Please get 2D Echo again because his PASP was high 53.   -  Labs every 3 months, starting :  CBC, CMP, PT INR  -  Ultrasound of abdomen and AFP every 6 months, next due in 1/2023   -  Cardiology appt (heart failure) for pulm hypertension.   -  Low salt in the diet, avoid canned, bottled and processed foods.  -  Continue current meds  -  Avoid alcohol, smoking, sedatives and meds with codeine.  -  Use sliding scale for lactulose as follows:  Take 1 cup 30 mL at 8AM.  If no stool by 12 noon, take 30 mL more of lactulose.  If <2 stools by 3 PM, take 30 mL more of lactulose.  Goal is 3-4 soft stools daily.   -  Avoid high intake of Tylenol (more than 4 extra-strength pills in one day)  -  Call us if any bleeding, fevers, confusion, disorientation occur  -  Endoscopy: to be done here in GI.   -  Transplant option discussed, will evaluate when MELD >15.  -  Return in 2 months - change his appt to see me from 1/13/23 to after 1/17/23, hopefully his 2D ECHO get repeated before I see him .       No follow-ups on file.      Order summary:  No orders of the defined types were placed in this encounter.        Thank you so much for allowing me to participate in the care of Juan Carlos Cota MD

## 2022-12-14 ENCOUNTER — OFFICE VISIT (OUTPATIENT)
Dept: INFECTIOUS DISEASES | Facility: CLINIC | Age: 67
End: 2022-12-14
Payer: COMMERCIAL

## 2022-12-14 ENCOUNTER — CLINICAL SUPPORT (OUTPATIENT)
Dept: INFECTIOUS DISEASES | Facility: CLINIC | Age: 67
End: 2022-12-14
Payer: COMMERCIAL

## 2022-12-14 ENCOUNTER — OFFICE VISIT (OUTPATIENT)
Dept: TRANSPLANT | Facility: CLINIC | Age: 67
End: 2022-12-14
Payer: COMMERCIAL

## 2022-12-14 VITALS
DIASTOLIC BLOOD PRESSURE: 71 MMHG | SYSTOLIC BLOOD PRESSURE: 137 MMHG | HEIGHT: 73 IN | TEMPERATURE: 98 F | HEART RATE: 89 BPM | BODY MASS INDEX: 36.88 KG/M2 | WEIGHT: 278.25 LBS

## 2022-12-14 VITALS
WEIGHT: 278 LBS | SYSTOLIC BLOOD PRESSURE: 134 MMHG | DIASTOLIC BLOOD PRESSURE: 74 MMHG | BODY MASS INDEX: 36.84 KG/M2 | HEIGHT: 73 IN | OXYGEN SATURATION: 96 % | RESPIRATION RATE: 16 BRPM | HEART RATE: 94 BPM | TEMPERATURE: 97 F

## 2022-12-14 DIAGNOSIS — Z76.82 ORGAN TRANSPLANT CANDIDATE: ICD-10-CM

## 2022-12-14 DIAGNOSIS — Z71.85 VACCINE COUNSELING: ICD-10-CM

## 2022-12-14 DIAGNOSIS — Z76.82 ORGAN TRANSPLANT CANDIDATE: Primary | ICD-10-CM

## 2022-12-14 DIAGNOSIS — Z23 NEED FOR HEPATITIS B VACCINATION: ICD-10-CM

## 2022-12-14 DIAGNOSIS — Z23 NEED FOR SHINGLES VACCINE: ICD-10-CM

## 2022-12-14 DIAGNOSIS — Z01.818 PRE-TRANSPLANT EVALUATION FOR CHRONIC LIVER DISEASE: ICD-10-CM

## 2022-12-14 DIAGNOSIS — K74.60 HEPATIC CIRRHOSIS, UNSPECIFIED HEPATIC CIRRHOSIS TYPE, UNSPECIFIED WHETHER ASCITES PRESENT: Primary | ICD-10-CM

## 2022-12-14 DIAGNOSIS — Z23 FLU VACCINE NEED: ICD-10-CM

## 2022-12-14 PROCEDURE — 99999 PR PBB SHADOW E&M-EST. PATIENT-LVL IV: CPT | Mod: PBBFAC,TXP,, | Performed by: NURSE PRACTITIONER

## 2022-12-14 PROCEDURE — 1159F MED LIST DOCD IN RCRD: CPT | Mod: CPTII,S$GLB,TXP, | Performed by: INTERNAL MEDICINE

## 2022-12-14 PROCEDURE — 99999 PR PBB SHADOW E&M-EST. PATIENT-LVL I: ICD-10-PCS | Mod: PBBFAC,TXP,,

## 2022-12-14 PROCEDURE — 3044F HG A1C LEVEL LT 7.0%: CPT | Mod: CPTII,S$GLB,TXP, | Performed by: NURSE PRACTITIONER

## 2022-12-14 PROCEDURE — 1101F PR PT FALLS ASSESS DOC 0-1 FALLS W/OUT INJ PAST YR: ICD-10-PCS | Mod: CPTII,S$GLB,TXP, | Performed by: NURSE PRACTITIONER

## 2022-12-14 PROCEDURE — 3044F PR MOST RECENT HEMOGLOBIN A1C LEVEL <7.0%: ICD-10-PCS | Mod: CPTII,S$GLB,TXP, | Performed by: NURSE PRACTITIONER

## 2022-12-14 PROCEDURE — 99204 PR OFFICE/OUTPT VISIT, NEW, LEVL IV, 45-59 MIN: ICD-10-PCS | Mod: 25,S$GLB,TXP, | Performed by: NURSE PRACTITIONER

## 2022-12-14 PROCEDURE — 1126F PR PAIN SEVERITY QUANTIFIED, NO PAIN PRESENT: ICD-10-PCS | Mod: CPTII,S$GLB,TXP, | Performed by: INTERNAL MEDICINE

## 2022-12-14 PROCEDURE — 99204 OFFICE O/P NEW MOD 45 MIN: CPT | Mod: 25,S$GLB,TXP, | Performed by: NURSE PRACTITIONER

## 2022-12-14 PROCEDURE — 3078F PR MOST RECENT DIASTOLIC BLOOD PRESSURE < 80 MM HG: ICD-10-PCS | Mod: CPTII,S$GLB,TXP, | Performed by: INTERNAL MEDICINE

## 2022-12-14 PROCEDURE — 99999 PR PBB SHADOW E&M-EST. PATIENT-LVL III: CPT | Mod: PBBFAC,TXP,, | Performed by: INTERNAL MEDICINE

## 2022-12-14 PROCEDURE — 3288F FALL RISK ASSESSMENT DOCD: CPT | Mod: CPTII,S$GLB,TXP, | Performed by: NURSE PRACTITIONER

## 2022-12-14 PROCEDURE — 3078F DIAST BP <80 MM HG: CPT | Mod: CPTII,S$GLB,TXP, | Performed by: INTERNAL MEDICINE

## 2022-12-14 PROCEDURE — 99999 PR PBB SHADOW E&M-EST. PATIENT-LVL IV: ICD-10-PCS | Mod: PBBFAC,TXP,, | Performed by: NURSE PRACTITIONER

## 2022-12-14 PROCEDURE — 1101F PT FALLS ASSESS-DOCD LE1/YR: CPT | Mod: CPTII,S$GLB,TXP, | Performed by: INTERNAL MEDICINE

## 2022-12-14 PROCEDURE — 3075F PR MOST RECENT SYSTOLIC BLOOD PRESS GE 130-139MM HG: ICD-10-PCS | Mod: CPTII,S$GLB,TXP, | Performed by: NURSE PRACTITIONER

## 2022-12-14 PROCEDURE — 3078F PR MOST RECENT DIASTOLIC BLOOD PRESSURE < 80 MM HG: ICD-10-PCS | Mod: CPTII,S$GLB,TXP, | Performed by: NURSE PRACTITIONER

## 2022-12-14 PROCEDURE — 99214 OFFICE O/P EST MOD 30 MIN: CPT | Mod: S$GLB,TXP,, | Performed by: INTERNAL MEDICINE

## 2022-12-14 PROCEDURE — 3288F FALL RISK ASSESSMENT DOCD: CPT | Mod: CPTII,S$GLB,TXP, | Performed by: INTERNAL MEDICINE

## 2022-12-14 PROCEDURE — 3075F SYST BP GE 130 - 139MM HG: CPT | Mod: CPTII,S$GLB,TXP, | Performed by: INTERNAL MEDICINE

## 2022-12-14 PROCEDURE — 90739 HEPATITIS B (RECOMBINANT) ADJUVANTED, 2 DOSE: ICD-10-PCS | Mod: S$GLB,TXP,, | Performed by: NURSE PRACTITIONER

## 2022-12-14 PROCEDURE — 90471 IMMUNIZATION ADMIN: CPT | Mod: S$GLB,TXP,, | Performed by: NURSE PRACTITIONER

## 2022-12-14 PROCEDURE — 90471 HEPATITIS B (RECOMBINANT) ADJUVANTED, 2 DOSE: ICD-10-PCS | Mod: S$GLB,TXP,, | Performed by: NURSE PRACTITIONER

## 2022-12-14 PROCEDURE — 99214 PR OFFICE/OUTPT VISIT, EST, LEVL IV, 30-39 MIN: ICD-10-PCS | Mod: S$GLB,TXP,, | Performed by: INTERNAL MEDICINE

## 2022-12-14 PROCEDURE — 3008F BODY MASS INDEX DOCD: CPT | Mod: CPTII,S$GLB,TXP, | Performed by: NURSE PRACTITIONER

## 2022-12-14 PROCEDURE — 90739 HEPB VACC 2/4 DOSE ADULT IM: CPT | Mod: S$GLB,TXP,, | Performed by: NURSE PRACTITIONER

## 2022-12-14 PROCEDURE — 1126F AMNT PAIN NOTED NONE PRSNT: CPT | Mod: CPTII,S$GLB,TXP, | Performed by: NURSE PRACTITIONER

## 2022-12-14 PROCEDURE — 99999 PR PBB SHADOW E&M-EST. PATIENT-LVL I: CPT | Mod: PBBFAC,TXP,,

## 2022-12-14 PROCEDURE — 3075F SYST BP GE 130 - 139MM HG: CPT | Mod: CPTII,S$GLB,TXP, | Performed by: NURSE PRACTITIONER

## 2022-12-14 PROCEDURE — 3008F BODY MASS INDEX DOCD: CPT | Mod: CPTII,S$GLB,TXP, | Performed by: INTERNAL MEDICINE

## 2022-12-14 PROCEDURE — 1159F PR MEDICATION LIST DOCUMENTED IN MEDICAL RECORD: ICD-10-PCS | Mod: CPTII,S$GLB,TXP, | Performed by: INTERNAL MEDICINE

## 2022-12-14 PROCEDURE — 3078F DIAST BP <80 MM HG: CPT | Mod: CPTII,S$GLB,TXP, | Performed by: NURSE PRACTITIONER

## 2022-12-14 PROCEDURE — 3288F PR FALLS RISK ASSESSMENT DOCUMENTED: ICD-10-PCS | Mod: CPTII,S$GLB,TXP, | Performed by: INTERNAL MEDICINE

## 2022-12-14 PROCEDURE — 1101F PT FALLS ASSESS-DOCD LE1/YR: CPT | Mod: CPTII,S$GLB,TXP, | Performed by: NURSE PRACTITIONER

## 2022-12-14 PROCEDURE — 3008F PR BODY MASS INDEX (BMI) DOCUMENTED: ICD-10-PCS | Mod: CPTII,S$GLB,TXP, | Performed by: INTERNAL MEDICINE

## 2022-12-14 PROCEDURE — 3044F PR MOST RECENT HEMOGLOBIN A1C LEVEL <7.0%: ICD-10-PCS | Mod: CPTII,S$GLB,TXP, | Performed by: INTERNAL MEDICINE

## 2022-12-14 PROCEDURE — 3075F PR MOST RECENT SYSTOLIC BLOOD PRESS GE 130-139MM HG: ICD-10-PCS | Mod: CPTII,S$GLB,TXP, | Performed by: INTERNAL MEDICINE

## 2022-12-14 PROCEDURE — 99999 PR PBB SHADOW E&M-EST. PATIENT-LVL III: ICD-10-PCS | Mod: PBBFAC,TXP,, | Performed by: INTERNAL MEDICINE

## 2022-12-14 PROCEDURE — 1101F PR PT FALLS ASSESS DOC 0-1 FALLS W/OUT INJ PAST YR: ICD-10-PCS | Mod: CPTII,S$GLB,TXP, | Performed by: INTERNAL MEDICINE

## 2022-12-14 PROCEDURE — 3288F PR FALLS RISK ASSESSMENT DOCUMENTED: ICD-10-PCS | Mod: CPTII,S$GLB,TXP, | Performed by: NURSE PRACTITIONER

## 2022-12-14 PROCEDURE — 1126F PR PAIN SEVERITY QUANTIFIED, NO PAIN PRESENT: ICD-10-PCS | Mod: CPTII,S$GLB,TXP, | Performed by: NURSE PRACTITIONER

## 2022-12-14 PROCEDURE — 3044F HG A1C LEVEL LT 7.0%: CPT | Mod: CPTII,S$GLB,TXP, | Performed by: INTERNAL MEDICINE

## 2022-12-14 PROCEDURE — 1126F AMNT PAIN NOTED NONE PRSNT: CPT | Mod: CPTII,S$GLB,TXP, | Performed by: INTERNAL MEDICINE

## 2022-12-14 PROCEDURE — 3008F PR BODY MASS INDEX (BMI) DOCUMENTED: ICD-10-PCS | Mod: CPTII,S$GLB,TXP, | Performed by: NURSE PRACTITIONER

## 2022-12-14 RX ORDER — AMLODIPINE BESYLATE 10 MG/1
10 TABLET ORAL DAILY
COMMUNITY
End: 2023-10-30 | Stop reason: SDUPTHER

## 2022-12-14 NOTE — PROGRESS NOTES
Patient received Hep B #1 vaccine in the left deltoid. Pt tolerated well. Pt asked to wait in the clinic 15 minutes after injection in the event of an allergic reaction. Pt verbalized understanding. Pt left in NAD.

## 2022-12-14 NOTE — LETTER
December 14, 2022        Aruna Miranda  [address]  [place] [address]  Phone: [phone]  Fax: [phone]             [facility]  [address]  [facility] [address]  Phone: [phone]   Patient: Juan Carlos Yoo Sr.   MR Number: [id number]   YOB: 1955   Date of Visit: 12/14/2022       Dear Dr. Aruna Miranda    Thank you for referring Juan Carlos Yoo to me for evaluation. Attached you will find relevant portions of my assessment and plan of care.    If you have questions, please do not hesitate to call me. I look forward to following Juan Carlos Yoo along with you.    Sincerely,    Deanna Cota MD    Enclosure    If you would like to receive this communication electronically, please contact [email or web address] or [phone] to request [employer] access.    [employer] is a tool which provides read-only access to select patient information with whom you have a relationship. Its easy to use and provides real time access to review your patients record including encounter summaries, notes, results, and demographic information.    If you feel you have received this communication in error or would no longer like to receive these types of communications, please e-mail [email or web address]

## 2022-12-14 NOTE — PATIENT INSTRUCTIONS
Recommended vaccines today:  Hepatitis B (two doses 1 month apart) - will give today   Shingles vaccine (two doses 2 to 6 months apart) - understand you would like to defer this today. Will give you prescription to take to your pharmacy or PCP  COVID bivalent booster X1     2.  The most common adverse effects of vaccines are pain, soreness, mild swelling at injection site.  Occasionally may experience low grade fevers, body aches.  Some people experience high fever, headaches, and more pronounced body aches with the shingles vaccine.     3.  Call us with any questions/concerns

## 2022-12-14 NOTE — PATIENT INSTRUCTIONS
Recommendations:  -  Please get 2D Echo again because his PASP was high 53.   -  Labs every 3 months, starting :  CBC, CMP, PT INR  -  Ultrasound of abdomen and AFP every 6 months, next due in 1/2023   -  Cardiology appt (heart failure) for pulm hypertension.   -  Low salt in the diet, avoid canned, bottled and processed foods.  -  Continue current meds  -  Avoid alcohol, smoking, sedatives and meds with codeine.  -  Use sliding scale for lactulose as follows:  Take 1 cup 30 mL at 8AM.  If no stool by 12 noon, take 30 mL more of lactulose.  If <2 stools by 3 PM, take 30 mL more of lactulose.  Goal is 3-4 soft stools daily.   -  Avoid high intake of Tylenol (more than 4 extra-strength pills in one day)  -  Call us if any bleeding, fevers, confusion, disorientation occur  -  Endoscopy: to be done here in GI.   -  Transplant option discussed, will evaluate when MELD >15.  -  Return in 2 months - change his appt to see me from 1/13/23 to after 1/17/23, hopefully his 2D ECHO get repeated before I see him .

## 2022-12-14 NOTE — PROGRESS NOTES
PRE-TRANSPLANT INFECTIOUS DISEASE CONSULT    Reason for Visit:  Pre-transplant evaluation  Referring Provider: Dr. Deanna Cota     History of Present Illness:    67 y.o. male with a history of decompensated cirrhosis who  presents for pre-liver transplant evaluation.    Infectious History:  Recent hospital admissions: No  Recent infections: Yes.  ?Prostate infection.  Tx with 10 days of abx   Recent or current antibiotic use: Yes.  Prostatitis.  Tx with Bactrim   History of recurrent infections *(sinus / pneumonia / UTI / SBP)*: No  Recent dental infections, issues or procedures: Yes.  Needs two teeth pulled.  Waiting for implants   History of chicken pox or shingles: Yes.  No shingles  History of STI: No  History of COVID infection: No    History of Immunosuppression:  Prior chemotherapy / immunosuppression: No  Prior transplant: No  History of splenectomy: No    Tuberculosis:  Prior screening for latent TB: Yes  Prior diagnosis of latent TB: No  Risk factors for TB *known exposure, incarceration, homelessness*: No    Geographical exposures:  Currently lives in [place].  Lives by himself.  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    Pets: No  Livestock: No  Fishing / hunting: Yes  Hobbies: travel in the , doing things around house  Water: [place] water  Consumption of raw/undercooked meat or seafood?  No  Tobacco: No  Alcohol: No.  Quit  Recreational drug use:  No  Sexual partners: Women only      Latest Reference Range & Units 12/13/22 10:18   Hepatitis A Antibody IgG  Reactive   Hep B Core Total Ab Non-reactive  Non-reactive   Hep B S Ab mIU/mL  -  <3.00  Non-reactive   Hepatitis B Surface Ag Non-reactive  Non-reactive   Hepatitis C Ab Non-reactive  Non-reactive   Mitogen - Nil IU/mL 4.477   NIL IU/mL 0.28468   TB Gold Plus Negative   Negative   TB1 - Nil IU/mL -0.006   TB2 - Nil IU/mL -0.007   HIV 1/2 Ag/Ab Non-reactive  Non-reactive   RPR Non-reactive  Non-reactive      Latest Reference Range & Units 12/13/22 10:18   CMV IgG Interpretation Non-Reactive  Reactive !   Strongyloides Ab IgG Negative  Negative   Varicella IgG 0.00 - 0.90 ISR 3.44 (H)   Varicella Interpretation Negative  Positive !       Past Histories:   Past Medical History:   Diagnosis Date    Bulging disc     Cirrhosis     Colon polyp 12/29/2017    Rpt 5 yrs    EV (esophageal varices)     Skin cancer 03/2017    Thrombocytopenia      Past Surgical History:   Procedure Laterality Date    CHOLECYSTECTOMY      COLONOSCOPY      COLONOSCOPY N/A 12/29/2017    ta rpt 2022    HERNIA REPAIR      SKIN BIOPSY  03/2017    TONSILLECTOMY      UPPER GASTROINTESTINAL ENDOSCOPY  2011    EV    UPPER GASTROINTESTINAL ENDOSCOPY  2016    VASECTOMY       Family History   Problem Relation Age of Onset    Hyperlipidemia Brother     Cancer Maternal Uncle         Leukemia    Heart disease Maternal Uncle     Ovarian cancer Sister     Colon cancer Neg Hx      Social History     Tobacco Use    Smoking status: Never    Smokeless tobacco: Never   Substance Use Topics    Alcohol use: Not Currently     Alcohol/week: 0.0 standard drinks    Drug use: No     Review of patient's allergies indicates:  No Known Allergies      Immunization History:  Received all childhood vaccines: Yes  All household members receive annual flu vaccine: Yes  All household members are up to date on COVID vaccine: Yes    Flu - up to date   COVID - completed primary series  Tdap - 1/23/22  Hep A - immune  Hep B - denies. None immune  PNA - up to date. PCV 13 and PPSV23  Shingrix - denies     Immunization History   Administered Date(s) Administered    COVID-19, MRNA, LN-S, PF (MODERNA FULL 0.5 ML DOSE) 02/25/2021, 03/27/2021    Influenza 01/03/2013    Influenza (FLUAD) - Quadrivalent - Adjuvanted - PF *Preferred* (65+) 09/29/2022     Influenza - High Dose - PF (65 years and older) 10/08/2018, 11/20/2019    Influenza - Quadrivalent - PF *Preferred* (6 months and older) 01/19/2017, 02/12/2018    Influenza - Trivalent (ADULT) 10/30/2014    Influenza - Trivalent - PF (ADULT) 01/03/2013    Influenza A (H1N1) 2009 Monovalent - IM - PF 12/09/2009    Pneumococcal Conjugate - 13 Valent 10/09/2018    Pneumococcal Polysaccharide - 23 Valent 04/08/2019    Td - PF (ADULT) 01/19/2017    Tdap 01/23/2022          Current antibiotics:  Antibiotics (From admission, onward)      None          Review of Systems   Constitutional: Negative for chills, decreased appetite, fever, malaise/fatigue, night sweats, weight gain and weight loss.   HENT:  Negative for congestion, ear pain, hearing loss, hoarse voice, sore throat and tinnitus.    Eyes:  Negative for blurred vision, redness and visual disturbance.   Cardiovascular:  Negative for chest pain, leg swelling and palpitations.   Respiratory:  Positive for shortness of breath. Negative for cough, hemoptysis, sputum production and wheezing.    Endocrine: Negative for cold intolerance and heat intolerance.   Hematologic/Lymphatic: Negative for adenopathy. Bruises/bleeds easily.   Skin:  Positive for dry skin, skin cancer (history of skin cancer) and suspicious lesions (history of skin cancer). Negative for itching and rash.   Musculoskeletal:  Positive for back pain (opccasional sciatica). Negative for joint pain, myalgias and neck pain.   Gastrointestinal:  Negative for abdominal pain, constipation, diarrhea, heartburn, nausea and vomiting.   Genitourinary:  Negative for dysuria, flank pain, frequency, hematuria, hesitancy and urgency.   Neurological:  Negative for dizziness, headaches, numbness, paresthesias and weakness.   Psychiatric/Behavioral:  Negative for depression and memory loss. The patient does not have insomnia and is not nervous/anxious.    Allergic/Immunologic: Negative for environmental allergies, HIV  exposure, hives and persistent infections.        Objective  Physical Exam  Vitals reviewed.   Constitutional:       General: He is not in acute distress.     Appearance: Normal appearance. He is well-developed. He is not ill-appearing, toxic-appearing or diaphoretic.   HENT:      Head: Normocephalic and atraumatic.      Nose: Nose normal. No congestion.      Mouth/Throat:      Mouth: Mucous membranes are moist. No lacerations or oral lesions.      Dentition: Abnormal dentition. Does not have dentures. Dental caries present. No dental abscesses.      Pharynx: Uvula midline. No oropharyngeal exudate or posterior oropharyngeal erythema.      Comments: Cracked/broken right lower molar  Eyes:      General: Lids are normal. No scleral icterus.     Conjunctiva/sclera: Conjunctivae normal.   Cardiovascular:      Rate and Rhythm: Normal rate and regular rhythm.      Heart sounds: No murmur heard.    No friction rub. No gallop.   Pulmonary:      Effort: Pulmonary effort is normal. No respiratory distress.      Breath sounds: Normal breath sounds. No decreased breath sounds, wheezing, rhonchi or rales.   Abdominal:      General: Bowel sounds are normal. There is distension.      Palpations: Abdomen is soft.      Tenderness: There is no abdominal tenderness. There is no guarding.   Musculoskeletal:      Cervical back: Neck supple.      Right lower leg: No edema.      Left lower leg: No edema.   Lymphadenopathy:      Head:      Right side of head: No submental, submandibular, tonsillar, preauricular, posterior auricular or occipital adenopathy.      Left side of head: No submental, submandibular, tonsillar, preauricular, posterior auricular or occipital adenopathy.      Cervical: No cervical adenopathy.      Upper Body:      Right upper body: No supraclavicular or epitrochlear adenopathy.      Left upper body: No supraclavicular or epitrochlear adenopathy.      Lower Body: No right inguinal adenopathy. No left inguinal  adenopathy.   Skin:     General: Skin is warm and dry.      Coloration: Skin is not pale.      Findings: Bruising and lesion (?warts/keratoses bilateral arms - has been putting compound W on it) present. No erythema or rash.      Comments: Sun damage    Neurological:      Mental Status: He is alert and oriented to person, place, and time.   Psychiatric:         Mood and Affect: Mood normal.         Behavior: Behavior normal.       MELD: *liver transplant only*  MELD-Na score: 14 at 12/13/2022 10:57 AM  MELD score: 14 at 12/13/2022 10:57 AM  Calculated from:  Serum Creatinine: 1.3 mg/dL at 12/13/2022 10:18 AM  Serum Sodium: 140 mmol/L (Using max of 137 mmol/L) at 12/13/2022 10:18 AM  Total Bilirubin: 1.9 mg/dL at 12/13/2022 10:18 AM  INR(ratio): 1.3 at 12/13/2022 10:57 AM  Age: 67 years      Labs:    CBC:   Lab Results   Component Value Date    WBC 4.86 12/13/2022    HGB 13.8 (L) 12/13/2022    HCT 40.9 12/13/2022    MCV 95 12/13/2022     (L) 12/13/2022    GRAN 2.8 12/13/2022    GRAN 56.6 12/13/2022    LYMPH 1.2 12/13/2022    LYMPH 25.3 12/13/2022    MONO 0.7 12/13/2022    MONO 13.6 12/13/2022    EOSINOPHIL 3.3 12/13/2022       CMP:   Lab Results   Component Value Date     12/13/2022    K 3.5 12/13/2022    CL 99 12/13/2022    CO2 32 (H) 12/13/2022     12/13/2022    BUN 14 12/13/2022    CREATININE 1.3 12/13/2022    CALCIUM 9.1 12/13/2022    MG 1.4 (L) 11/29/2022    ALBUMIN 3.0 (L) 12/13/2022    PROT 7.7 12/13/2022    ALT 9 (L) 12/13/2022    AST 20 12/13/2022    GGT 30 12/13/2022    ALKPHOS 52 (L) 12/13/2022    BILITOT 1.9 (H) 12/13/2022    ESTGFRAFRICA >60.0 06/13/2022       Syphilis screening:   Lab Results   Component Value Date    RPR Non-reactive 12/13/2022        TB screening: No results found for: TBGOLDPLUS, TSPOTSCREN    HIV screening:   Lab Results   Component Value Date    [id number] Non-reactive 12/13/2022       Strongyloides IgG: No results found for: STRONGANTIGG    Hepatitis Serologies:    Lab Results   Component Value Date    HEPAIGG Reactive 12/13/2022    HEPBCAB Non-reactive 12/13/2022    HEPBSAB <3.00 12/13/2022    HEPBSAB Non-reactive 12/13/2022    HEPCAB Non-reactive 12/13/2022        Varicella IgG: No results found for: VARICELLAINT    Recent Microbiology:   Microbiology Results (last 7 days)       ** No results found for the last 168 hours. **              Radiology:    CXR 6/22 - clear      Assessment and Plan    1. Risks of Infection: Available serologies were reviewed. No unusual risks of infection or significant barriers to transplantation were identified from the infectious disease standpoint.   - Pending serologies: none   -   Recommend that patient address dental issues prior to transplant.  However, if unable to do so, this is not a contraindication to transplant.      2. Immunizations:  Based on the patient's immunization history and serologies, the following immunizations are recommended:  - Hepatitis A    Patient does have immunity to hepatitis A    Vaccination required: No   - Hepatitis B    Patient does not have immunity to hepatitis B    Vaccination ordered today: Yes  45   - Shingrix - deferred today. Rx given to take to pharmacy    Recommended Pre-Transplant Immunization Schedule  Vaccine  0m 1m 2m 6m   Pneumococcal conjugate vaccine (Prevnar 20) X      Tetanus-diphtheria-pertussis (Tdap)* X      Hepatitis A Vaccine (Havrix)** X   X   Hepatitis B Vaccine (Heplisav)** X X     Influenza X      Zoster Recombinant Vaccine (Shingrix) X  X           *Administer booster every 10 years.       **Administer if no immunity demonstrated on serologies                 3. Counseling:   I discussed with the patient the risk for increased susceptibility to infections following transplantation including increased risk for infection right after transplant and if rejection should occur.  The patient has been counseled on the importance of vaccinations including but not limited to a yearly flu  vaccine. Patient was also instructed to encourage that family/caretakers receive their flu vaccine yearly. The patient was encouraged to contact us about any problems that may develop after immunizations and possible side effects were reviewed.     Specific guidance has been provided to the patient regarding the patient's occupation, hobbies and activities to avoid future infectious complications. These include but are not limited to: avoiding raw/undercooked meats and seafood, avoiding unpasteurized milk/cheeses, proper (hand) hygiene, contact with animals and appropriate vaccination of animals, use of mosquito/tick precautions, avoiding walking barefoot, avoiding sick contacts, and seeking medical advice prior to foreign travel (specifically developing countries).     4. Transplant Candidacy: Based on available information, there are no identified significant barriers to transplantation from an infectious disease standpoint.  Final determination of transplant candidacy will be made once evaluation is complete and reviewed by the Selection Committee.      Follow up with infectious disease as needed. Please call if any pending serologic testing is positive.      The total time for evaluation and management services performed on 12/14/22 was greater than 45 minutes.

## 2022-12-15 ENCOUNTER — OFFICE VISIT (OUTPATIENT)
Dept: CARDIOLOGY | Facility: CLINIC | Age: 67
End: 2022-12-15
Payer: COMMERCIAL

## 2022-12-15 VITALS
HEART RATE: 92 BPM | SYSTOLIC BLOOD PRESSURE: 155 MMHG | DIASTOLIC BLOOD PRESSURE: 72 MMHG | BODY MASS INDEX: 37.19 KG/M2 | HEIGHT: 73 IN | WEIGHT: 280.63 LBS

## 2022-12-15 DIAGNOSIS — I48.19 PERSISTENT ATRIAL FIBRILLATION: Primary | ICD-10-CM

## 2022-12-15 DIAGNOSIS — K70.30 ALCOHOLIC CIRRHOSIS OF LIVER WITHOUT ASCITES: ICD-10-CM

## 2022-12-15 DIAGNOSIS — D68.9 COAGULOPATHY: ICD-10-CM

## 2022-12-15 DIAGNOSIS — I27.20 PULMONARY HYPERTENSION: ICD-10-CM

## 2022-12-15 PROCEDURE — 99214 OFFICE O/P EST MOD 30 MIN: CPT | Mod: NTX,S$GLB,, | Performed by: INTERNAL MEDICINE

## 2022-12-15 PROCEDURE — 3288F PR FALLS RISK ASSESSMENT DOCUMENTED: ICD-10-PCS | Mod: CPTII,NTX,S$GLB, | Performed by: INTERNAL MEDICINE

## 2022-12-15 PROCEDURE — 3008F PR BODY MASS INDEX (BMI) DOCUMENTED: ICD-10-PCS | Mod: CPTII,NTX,S$GLB, | Performed by: INTERNAL MEDICINE

## 2022-12-15 PROCEDURE — 99999 PR PBB SHADOW E&M-EST. PATIENT-LVL IV: ICD-10-PCS | Mod: PBBFAC,TXP,, | Performed by: INTERNAL MEDICINE

## 2022-12-15 PROCEDURE — 1159F MED LIST DOCD IN RCRD: CPT | Mod: CPTII,NTX,S$GLB, | Performed by: INTERNAL MEDICINE

## 2022-12-15 PROCEDURE — 3078F DIAST BP <80 MM HG: CPT | Mod: CPTII,NTX,S$GLB, | Performed by: INTERNAL MEDICINE

## 2022-12-15 PROCEDURE — 1126F PR PAIN SEVERITY QUANTIFIED, NO PAIN PRESENT: ICD-10-PCS | Mod: CPTII,NTX,S$GLB, | Performed by: INTERNAL MEDICINE

## 2022-12-15 PROCEDURE — 1101F PT FALLS ASSESS-DOCD LE1/YR: CPT | Mod: CPTII,NTX,S$GLB, | Performed by: INTERNAL MEDICINE

## 2022-12-15 PROCEDURE — 3288F FALL RISK ASSESSMENT DOCD: CPT | Mod: CPTII,NTX,S$GLB, | Performed by: INTERNAL MEDICINE

## 2022-12-15 PROCEDURE — 3008F BODY MASS INDEX DOCD: CPT | Mod: CPTII,NTX,S$GLB, | Performed by: INTERNAL MEDICINE

## 2022-12-15 PROCEDURE — 3044F HG A1C LEVEL LT 7.0%: CPT | Mod: CPTII,NTX,S$GLB, | Performed by: INTERNAL MEDICINE

## 2022-12-15 PROCEDURE — 3078F PR MOST RECENT DIASTOLIC BLOOD PRESSURE < 80 MM HG: ICD-10-PCS | Mod: CPTII,NTX,S$GLB, | Performed by: INTERNAL MEDICINE

## 2022-12-15 PROCEDURE — 3044F PR MOST RECENT HEMOGLOBIN A1C LEVEL <7.0%: ICD-10-PCS | Mod: CPTII,NTX,S$GLB, | Performed by: INTERNAL MEDICINE

## 2022-12-15 PROCEDURE — 1159F PR MEDICATION LIST DOCUMENTED IN MEDICAL RECORD: ICD-10-PCS | Mod: CPTII,NTX,S$GLB, | Performed by: INTERNAL MEDICINE

## 2022-12-15 PROCEDURE — 99999 PR PBB SHADOW E&M-EST. PATIENT-LVL IV: CPT | Mod: PBBFAC,TXP,, | Performed by: INTERNAL MEDICINE

## 2022-12-15 PROCEDURE — 3077F PR MOST RECENT SYSTOLIC BLOOD PRESSURE >= 140 MM HG: ICD-10-PCS | Mod: CPTII,NTX,S$GLB, | Performed by: INTERNAL MEDICINE

## 2022-12-15 PROCEDURE — 1101F PR PT FALLS ASSESS DOC 0-1 FALLS W/OUT INJ PAST YR: ICD-10-PCS | Mod: CPTII,NTX,S$GLB, | Performed by: INTERNAL MEDICINE

## 2022-12-15 PROCEDURE — 1126F AMNT PAIN NOTED NONE PRSNT: CPT | Mod: CPTII,NTX,S$GLB, | Performed by: INTERNAL MEDICINE

## 2022-12-15 PROCEDURE — 99214 PR OFFICE/OUTPT VISIT, EST, LEVL IV, 30-39 MIN: ICD-10-PCS | Mod: NTX,S$GLB,, | Performed by: INTERNAL MEDICINE

## 2022-12-15 PROCEDURE — 3077F SYST BP >= 140 MM HG: CPT | Mod: CPTII,NTX,S$GLB, | Performed by: INTERNAL MEDICINE

## 2022-12-15 RX ORDER — NADOLOL 20 MG/1
40 TABLET ORAL NIGHTLY
Qty: 90 TABLET | Refills: 2 | Status: SHIPPED | OUTPATIENT
Start: 2022-12-15 | End: 2023-07-24 | Stop reason: SDUPTHER

## 2022-12-15 NOTE — PROGRESS NOTES
Subjective:   Chief Complaint: Persistent atrial fibrillation  Last Clinic Visit: New Patient    History of Present Illness: Juan Carlos Yoo Sr. is a 67 y.o. gentleman with cirrhosis, pulmonary hypertension, longstanding persistent atrial fibrillation, hypertension, likely sleep apnea, who presents to establish general cardiology care after recently seeing heart transplant.  He reports that he was initially diagnosed with atrial fibrillation during an outpatient elective colonoscopy earlier this year, procedure was deferred secondary to rapid heart rate at that time, and attempted some type of cardioversion which was not successful.  He has gone on to develop cirrhosis, and is currently following up with the hepatology Department here in their work the identified the atrial fibrillation, as well as elevated pulmonary pressures and referred to Cardiology for workup.  Initial referral was placed for EP and Interventional Cardiology from [facility] for both right heart catheterization as well as management of atrial fibrillation, patient was scheduled with general cardiology instead.  He reports not having any recent palpitations had palpitations with some type of arrhythmia 15-20 years ago and was managed for this with medications in the ED per patient, has been on antihypertensive therapy for the past 3 years.  He does not check his blood pressure at home, and does not have a blood pressure cuff.  Does have a mild coagulopathy and thrombocytopenia secondary to cirrhosis, most recent platelets 110, most recent INR 1.3.    Dx:  Longstanding persistent atrial fibrillation  Cirrhosis  Pulmonary hypertension  Hypertension   Likely Sleep apnea    Medications:  Outpatient Encounter Medications as of 12/15/2022   Medication Sig Dispense Refill    amLODIPine (NORVASC) 10 MG tablet       ascorbic acid, vitamin C, (VITAMIN C) 500 MG tablet Take 500 mg by mouth.      cyanocobalamin (VITAMIN B-12) 100 MCG tablet Take 100 mcg by mouth once  "daily.      fish oil-omega-3 fatty acids 300-1,000 mg capsule Take 1 g by mouth 2 (two) times daily.      furosemide (LASIX) 40 MG tablet Take 1 tablet (40 mg total) by mouth 2 (two) times a day. 30 tablet 6    pantoprazole (PROTONIX) 40 MG tablet Take 1 tablet (40 mg total) by mouth once daily. 90 tablet 0    spironolactone (ALDACTONE) 50 MG tablet Take 1 tablet (50 mg total) by mouth once daily. 90 tablet 3    tamsulosin (FLOMAX) 0.4 mg Cap Take 1 capsule (0.4 mg total) by mouth every evening. 30 capsule 6    [DISCONTINUED] nadoloL (CORGARD) 20 MG tablet Take 1 tablet (20 mg total) by mouth every evening. 90 tablet 2    apixaban (ELIQUIS) 5 mg Tab Take 1 tablet (5 mg total) by mouth 2 (two) times daily. 60 tablet 11    nadoloL (CORGARD) 20 MG tablet Take 2 tablets (40 mg total) by mouth every evening. 90 tablet 2     No facility-administered encounter medications on file as of 12/15/2022.     Social History:  Juan Carlos reports that he has never smoked. He has never used smokeless tobacco. He reports that he does not currently use alcohol. He reports that he does not use drugs.    Objective:   BP (!) 155/72 (BP Location: Right arm, Patient Position: Sitting, BP Method: Medium (Automatic))   Pulse 92   Ht 6' 1" (1.854 m)   Wt 127.3 kg (280 lb 10.3 oz)   BMI 37.03 kg/m²     Physical Exam   Constitutional: He does not appear ill. No distress.   HENT:   Head: Normocephalic and atraumatic.   Mouth/Throat: Mucous membranes are moist.   Cardiovascular: Normal rate, regular rhythm, normal heart sounds and normal pulses. Exam reveals no gallop and no friction rub.   No murmur heard.  Irregularly irregular   Pulmonary/Chest: Effort normal and breath sounds normal. No stridor. No respiratory distress. He has no wheezes. He has no rhonchi. He has no rales. He exhibits no tenderness.   Abdominal: Normal appearance.   Protuberant   Musculoskeletal:      Right lower leg: No edema.      Left lower leg: No edema.   Neurological: He " is alert.   Skin: Skin is warm.      EKG:  My independent visualization of most recent EKG is atrial fibrillation    TTE:  06/15/2022   The left ventricle is normal in size with normal systolic function.  The estimated ejection fraction is 55-60%.  The quantitatively derived ejection fraction is 56%.  A diastolic pattern consistent with atrial fibrillation observed.  Severe left atrial enlargement.  Mild right ventricular enlargement with normal right ventricular systolic function.  Severe right atrial enlargement.  Mild aortic regurgitation.  Mild mitral regurgitation.  The estimated PA systolic pressure is 53 mmHg.  There is moderate pulmonary hypertension.  Mild tricuspid regurgitation.  Intermediate central venous pressure (8 mmHg).     Lipids:  Recent Labs   Lab 04/01/22  1203   LDL Cholesterol 141.4   HDL 22 L      Renal:  Recent Labs   Lab 12/13/22  1018   Creatinine 1.3   Potassium 3.5   CO2 32 H   BUN 14     Liver:  Recent Labs   Lab 12/13/22  1018   AST 20   ALT 9 L     Assessment:     1. Persistent atrial fibrillation    2. Alcoholic cirrhosis of liver without ascites    3. Pulmonary hypertension    4. Coagulopathy      Plan:   1. Persistent atrial fibrillation  Still due for his follow-up with electrophysiology explained that at least 1 time attempt at cardioversion to ascertain symptoms in sinus rhythm would be reasonable.  Heart rate appears to be reasonably well-controlled increasing nadolol more for blood pressure.  Also noted to have chads Vasc of 2 explained that he was at increased stroke risk.  He denies any history of bleeding varices, does have mild coagulopathy INR 1.3 and platelets 113, but do not feel that coagulopathy is severe enough to outweigh the benefits of anticoagulation.  Discussed with patient and he was amenable to anticoagulation will start Eliquis 5 b.i.d..  - Ambulatory referral/consult to Electrophysiology  - Echo; Future  - nadoloL (CORGARD) 20 MG tablet; Take 2 tablets (40  mg total) by mouth every evening.  Dispense: 90 tablet; Refill: 2  - apixaban (ELIQUIS) 5 mg Tab; Take 1 tablet (5 mg total) by mouth 2 (two) times daily.  Dispense: 60 tablet; Refill: 11    2. Alcoholic cirrhosis of liver without ascites    - nadoloL (CORGARD) 20 MG tablet; Take 2 tablets (40 mg total) by mouth every evening.  Dispense: 90 tablet; Refill: 2    3. Pulmonary hypertension  Has been on 2 different diuretic therapies since June, improving blood pressure management as above, will re-evaluate PA pressures if still elevated refer for right heart catheterization.    4. Coagulopathy  As above    Follow up in 3-6 months      Cody Louis MD [facility]

## 2022-12-21 ENCOUNTER — TELEPHONE (OUTPATIENT)
Dept: TRANSPLANT | Facility: CLINIC | Age: 67
End: 2022-12-21
Payer: COMMERCIAL

## 2022-12-21 DIAGNOSIS — Z76.82 ORGAN TRANSPLANT CANDIDATE: Primary | ICD-10-CM

## 2022-12-21 NOTE — TELEPHONE ENCOUNTER
From: Deanna Cota MD   Sent: 12/14/2022   6:57 PM CST   To: [facility] Pre-Liver Transplant Clinical     IgG elevated, could be autoimmune hepatitis, but CIELO is negative. A1AT is low, ceruloplasmin and alk phos are low (could be Vimal's).     Please get alpha1 anti-trypsin phenotype      _________________  Alpha1 anti-trypsin phenotype ordered and scheduled with next lab

## 2022-12-22 ENCOUNTER — HOSPITAL ENCOUNTER (OUTPATIENT)
Dept: RADIOLOGY | Facility: HOSPITAL | Age: 67
Discharge: HOME | End: 2022-12-22
Attending: INTERNAL MEDICINE
Payer: COMMERCIAL

## 2022-12-22 ENCOUNTER — TELEPHONE (OUTPATIENT)
Dept: HEPATOLOGY | Facility: CLINIC | Age: 67
End: 2022-12-22
Payer: COMMERCIAL

## 2022-12-22 ENCOUNTER — CLINICAL SUPPORT (OUTPATIENT)
Dept: TRANSPLANT | Facility: CLINIC | Age: 67
End: 2022-12-22
Payer: COMMERCIAL

## 2022-12-22 ENCOUNTER — SOCIAL WORK (OUTPATIENT)
Dept: TRANSPLANT | Facility: CLINIC | Age: 67
End: 2022-12-22
Payer: COMMERCIAL

## 2022-12-22 VITALS
DIASTOLIC BLOOD PRESSURE: 72 MMHG | TEMPERATURE: 97 F | HEART RATE: 85 BPM | OXYGEN SATURATION: 96 % | HEIGHT: 71 IN | HEIGHT: 71 IN | DIASTOLIC BLOOD PRESSURE: 72 MMHG | SYSTOLIC BLOOD PRESSURE: 141 MMHG | RESPIRATION RATE: 16 BRPM | HEART RATE: 85 BPM | WEIGHT: 276 LBS | TEMPERATURE: 97 F | BODY MASS INDEX: 38.64 KG/M2 | OXYGEN SATURATION: 96 % | RESPIRATION RATE: 16 BRPM | BODY MASS INDEX: 38.64 KG/M2 | SYSTOLIC BLOOD PRESSURE: 141 MMHG | WEIGHT: 276 LBS

## 2022-12-22 DIAGNOSIS — Z76.82 ORGAN TRANSPLANT CANDIDATE: ICD-10-CM

## 2022-12-22 DIAGNOSIS — K76.9 LIVER DISEASE, UNSPECIFIED: Primary | ICD-10-CM

## 2022-12-22 LAB
AMPHET+METHAMPHET UR QL: NEGATIVE
BARBITURATES UR QL SCN>200 NG/ML: NEGATIVE
BENZODIAZ UR QL SCN>200 NG/ML: NEGATIVE
BILIRUB UR QL STRIP: NEGATIVE
BZE UR QL SCN: NEGATIVE
CANNABINOIDS UR QL SCN: NEGATIVE
CLARITY UR REFRACT.AUTO: CLEAR
COLOR UR AUTO: YELLOW
CREAT UR-MCNC: 31 MG/DL (ref 23–375)
ETHANOL UR-MCNC: <10 MG/DL
GLUCOSE UR QL STRIP: NEGATIVE
HGB UR QL STRIP: NEGATIVE
KETONES UR QL STRIP: NEGATIVE
LEUKOCYTE ESTERASE UR QL STRIP: NEGATIVE
METHADONE UR QL SCN>300 NG/ML: NEGATIVE
NITRITE UR QL STRIP: NEGATIVE
OPIATES UR QL SCN: NEGATIVE
PCP UR QL SCN>25 NG/ML: NEGATIVE
PH UR STRIP: 7 [PH] (ref 5–8)
PROT UR QL STRIP: NEGATIVE
SP GR UR STRIP: 1.01 (ref 1–1.03)
TOXICOLOGY INFORMATION: NORMAL
URN SPEC COLLECT METH UR: NORMAL

## 2022-12-22 PROCEDURE — 93975 VASCULAR STUDY: CPT | Mod: 26,TXP,, | Performed by: RADIOLOGY

## 2022-12-22 PROCEDURE — 93975 VASCULAR STUDY: CPT | Mod: TC,TXP

## 2022-12-22 PROCEDURE — 99999 PR PBB SHADOW E&M-EST. PATIENT-LVL I: ICD-10-PCS | Mod: PBBFAC,TXP,,

## 2022-12-22 PROCEDURE — 76700 US EXAM ABDOM COMPLETE: CPT | Mod: 26,59,TXP, | Performed by: RADIOLOGY

## 2022-12-22 PROCEDURE — 80307 DRUG TEST PRSMV CHEM ANLYZR: CPT | Mod: TXP | Performed by: INTERNAL MEDICINE

## 2022-12-22 PROCEDURE — 99999 PR PBB SHADOW E&M-EST. PATIENT-LVL III: CPT | Mod: PBBFAC,TXP,,

## 2022-12-22 PROCEDURE — 93975 US ABDOMEN COMP WITH DOPPLER_PRE LIVER TRANSPLANT: ICD-10-PCS | Mod: 26,TXP,, | Performed by: RADIOLOGY

## 2022-12-22 PROCEDURE — 76700 US ABDOMEN COMP WITH DOPPLER_PRE LIVER TRANSPLANT: ICD-10-PCS | Mod: 26,59,TXP, | Performed by: RADIOLOGY

## 2022-12-22 PROCEDURE — 99999 PR PBB SHADOW E&M-EST. PATIENT-LVL I: CPT | Mod: PBBFAC,TXP,,

## 2022-12-22 PROCEDURE — 99999 PR PBB SHADOW E&M-EST. PATIENT-LVL III: ICD-10-PCS | Mod: PBBFAC,TXP,,

## 2022-12-22 PROCEDURE — 81003 URINALYSIS AUTO W/O SCOPE: CPT | Mod: 59,TXP | Performed by: INTERNAL MEDICINE

## 2022-12-22 NOTE — PROGRESS NOTES
Transplant Surgery Liver Transplant Recipient Evaluation    Referring Physician: Aruna Miranda  Corresponding Physician: Aruna Miranda    Subjective:     Reason for Visit: evaluate liver transplant candidacy    History of Present Illness: Juan Carlos Yoo is a 67 y.o. year old male who is being evaluated for liver transplantation.    Transplant History: N/A    Native Liver Disease (UNOS terminology): Alcoholic Cirrhosis (based on medical records from referral).    Manifestations of liver disease: edema and jaundice  MELD-Na score: 14 at 12/13/2022 10:57 AM  MELD score: 14 at 12/13/2022 10:57 AM  Calculated from:  Serum Creatinine: 1.3 mg/dL at 12/13/2022 10:18 AM  Serum Sodium: 140 mmol/L (Using max of 137 mmol/L) at 12/13/2022 10:18 AM  Total Bilirubin: 1.9 mg/dL at 12/13/2022 10:18 AM  INR(ratio): 1.3 at 12/13/2022 10:57 AM  Age: 67 years    External provider notes reviewed: Yes    PMH: reviewed  PSH: reviewed    Review of Systems   Constitutional:  Negative for activity change and appetite change.   HENT:  Negative for congestion and tinnitus.    Eyes:  Negative for redness and visual disturbance.   Respiratory:  Negative for cough and shortness of breath.    Cardiovascular:  Negative for chest pain and palpitations.   Gastrointestinal:  Negative for abdominal distention and abdominal pain.   Endocrine: Negative for polydipsia and polyuria.   Genitourinary:  Negative for decreased urine volume and dysuria.   Musculoskeletal:  Negative for arthralgias and back pain.   Skin:  Negative for pallor and rash.   Allergic/Immunologic: Negative for environmental allergies and immunocompromised state.   Neurological:  Negative for tremors and headaches.   Hematological:  Negative for adenopathy. Does not bruise/bleed easily.   Psychiatric/Behavioral:  Negative for behavioral problems and confusion.    Objective:     Physical Exam:  Constitutional:   Vitals reviewed: yes   Well-nourished and well-groomed:  yes  Eyes:   Sclerae icteric: no   Extraocular movements intact: yes  GI:    Bowel sounds normal: yes   Tenderness: no    If yes, quadrant/location: not applicable   Palpable masses: no    If yes, quadrant/location: not applicable   Hepatosplenomegaly: no   Ascites: no   Hernia: no    If yes, type/location: not applicable   Surgical scars: yes    If yes, type/location: laparoscopic port sites  Resp:   Effort normal: yes   Breath sounds normal: yes    CV:   Regular rate and rhythm: yes   Heart sounds normal: yes   Femoral pulses normal: yes   Extremities edematous: no  Skin:   Rashes or lesions present: no    If yes, describe:not applicable   Jaundice:: no    Musculoskeletal:   Gait normal: yes   Strength normal: yes  Psych:   Oriented to person, place, and time: yes   Affect and mood normal: yes    Additional comments: not applicable    Diagnostics:  The following labs have been reviewed: CBC  CMP  The following radiology images have been independently reviewed and interpreted: CT Abd/Pelvis         Transplant Surgery - Candidacy   Assessment/Plan:   I have concerns with his central obesity, cardiac history and low MELD at the time of assessment. Based on available information, Juan Carlos Yoo SrAraseli is a marginal liver transplant candidate. Final determination of transplant candidacy will be made once evaluation is complete and reviewed by the Liver Transplant Selection Committee.    Patient advised that it is recommended that all transplant candidates, and their close contacts and household members receive Covid vaccination.    Additional testing to be completed according to Liver : Written Order Guideline for Adult Liver Transplant Evaluation (LI-02)    Interpretation of tests and discussion of patient management involves all members of the multidisciplinary transplant team.    Derik Cesar MD         Counseling: We provided Juan Carlos Yoo SrAraseli with a group education session today.  We discussed liver transplantation at  length with him, including risks, potential complications, and alternatives in the management of his liver disease.  The discussion included complications related to anesthesia, bleeding, infection, primary nonfunction, and vascular thrombosis.  I discussed the typical postoperative course, length of hospitalization, the need for long-term immunosuppression, and the need for long-term routine follow-up.  I discussed living-donor and -donor transplantation and the relative advantages and disadvantages of each.  I also discussed average waiting times for both living donation and  donation.  I discussed national and center-specific survival rates.  I also mentioned the potential benefit of multicenter listing to candidates listed with centers within more than one organ procurement organization.  All questions were answered.    Coronavirus disease (COVID-19) caused by severe acute respiratory virus coronavirus 2 (SARS-C0V 2) is associated with increased mortality in solid organ transplant recipients (SOT) compared to non-transplant patients. Vaccine responses to vaccination are depressed against SARS-CoV2 compared to normal individuals but improve with third vaccination doses. Vaccination prior to SOT provides both the best opportunity for transplant candidates to develop protective immunity and to reduce the risk of serious COVID19 infections post transplantation. Organ transplant candidates at [facility] will be required to receive SARS-CoV-2 vaccination prior to being listed with a an active status, whenever possible. Exceptions will be made for disability related reasons or for sincerely held Mandaen beliefs.     {Additional Surgery Counselin}

## 2022-12-22 NOTE — PROGRESS NOTES
Juan Carlos seen in clinic for Fast Pass evaluation.  Handbook on pre-liver transplant information (see outline below) was given to the patient.  Patient alone.  Patient aware that pre-liver transplant education slides will need to be viewed again via desktop in transplant clinic w/ caregiver present.  Informed consent signed and written information given on selection criteria.    LIVER TRANSPLANT WORK-UP EDUCATION   I. UNDERSTANDING THE TRANSPLANT PROCESS     A. Transplant team      B. MELD score      C. Balancing urgency and outcome     D. Liver Transplant Options         1.  Donor         2. Living Donor--rationale, benefits     E. Transplant Work-up         1. Medical         2. Psychological and Social--lifetime commitment, life changes, personal plan/ goal         3. Financial--fundraising     F.  Completed work-up and Next Steps    G. Wait Time         1.  Can be listed at more than one center         2.  Can transfer wait time     H. The Call       I. Possible donor options         1. DCD         2. Hep B Core and Hep C Positive         3. Increased Risk     J.  Liver Transplant Surgery         1. Length         2. Transfusions, cell saver         3. Surgical risks         4. What to expect after sugery--Central lines, drains, Boo catheter, incision, endotracheal              tube, NG tube, length of stay in ICU/ TSU  II.  HOW TO BEST CARE FOR YOURSELF (Take Five To Thrive)  III. UNDERSTANDING LIFE AFTER TRANSPLANT  A. Medicines after transplant      1. Immunosuppression--infection and rejection  B. Labs   IV. ADULT LIVER SURVIVAL RATES      V     RECURRENCE OF VIRAL HEPATITIS    VI.       Patient notified that HIV Testing is required for transplant evaluation and              repeated at scheduled intervals before and after transplant. Explained that              education will be provided, results will be discussed, and the appropriate              consults will be scheduled if needed.

## 2022-12-22 NOTE — PROGRESS NOTES
Transplant Recipient Adult Psychosocial Assessment    Patient came to this appointment alone.    Juan Carlos Yoo  [address]  [facility] [address]  Telephone Information:   Mobile [phone]   Mobile [phone]   Home  [phone] (home)  Work  There is no work phone number on file.  E-mail  No e-mail address on record    Sex: male  YOB: 1955  Age: 67 y.o.    Encounter Date: 2022  [place] Citizen: yes  Primary Language: English   Needed: no    Emergency Contact:  Name: Juan Carlos Yoo  Relationship: son  Address: [place]  Phone Numbers:  [phone] (mobile)    Family/Social Support:   Number of dependents/: none  Marital history: , patient has a significant other, Vivian Henderson, age 72 and they have been together for 13 years.  She lives in [place].  Other family dynamics: patient's parents are .  Patient has 4 siblings and 2 of them are  at this time.  Patient has 3 adult children, Juan Carlos Fredo Selby,age 44 (lives 5 miles from patient), Ashley Fredo, age 48 (lives in [place] [facility] [place]) and Mallory Fredo, age 43 ([facility]). Patient has a significant other, Vivian Henderson, age 72 (lives in [place]) and 3 grand children.    Household Composition:    Patient lives alone and lives 1.45 hr away from the transplant center.      Do you and your caregivers have access to reliable transportation? yes  PRIMARY CAREGIVER: Vivian Henderson, patient's significant other will be primary caregiver, phone number [phone]. Vivian was not at this meeting and according to patient, he was not told to bring a caregiver.  Vivian lives in [place] and patient said that he will try to bring her for his next appointment.     provided in-depth information to patient and caregiver regarding pre- and post-transplant caregiver role.   strongly encourages patient and caregiver to have concrete plan regarding post-transplant care giving, including back-up caregiver(s) to ensure  care giving needs are met as needed.    Patient states understanding all aspects of caregiver role/commitment.    Patient verbalizes understanding of the education provided today.         remains available. Patient agree to contact  in a timely manner if concerns arise.      Able to take time off work without financial concerns:  retired .     Additional Significant Others who will Assist with Transplant:  Name: Juan Carlos Yoo Jr (left a message but has not called back to confirm)  Age: 44  City: [place] State: [place]  Relationship: son  Does person drive? yes      Living Will: no  Healthcare Power of : no  Advance Directives on file: <<no information> per medical record.  Verbally reviewed LW/HCPA information.   provided patient with copy of LW/HCPA documents and provided education on completion of forms.    Living Donors: N/A    Highest Education Level: Attended College/Technical School  Reading Ability: college  Reports difficulty with: seeing, wears glasses.  Learns Best By:  reading.     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  Patient calls himself semi-retired. He stopped working 3 years ago and helps out as needed.     Spouse/Significant Other Employment: NA    Disabled: no    Monthly Income:  Salary/Wages: $2000, receives salary from his company.  Able to afford all costs now and if transplanted, including medications: yes  Patient verbalizes understanding of personal responsibilities related to transplant costs and the importance of having a financial plan to ensure that patients transplant costs are fully covered.       provided fundraising information/education. Patientverbalizes understanding.   remains available.    Insurance:   Payer/Plan Subscr  Sex Relation Sub. Ins. ID  Effective Group Num   1. BLUE CROSS BL* JACQUIE RODRIGUEZ SR. 1955 Male Self [id number] 9/1/15 [id number]                                   [address] [address]     Primary Insurance (for UNOS reporting): Private Insurance, BCBS.  Secondary Insurance (for UNOS reporting): None, per patient, he has Medicare but it is not noted in charts.  Patient verbalizes clear understanding that patient may experience difficulty obtaining and/or be denied insurance coverage post-surgery. This includes and is not limited to disability insurance, life insurance, health insurance, burial insurance, long term care insurance, and other insurances.      Patient also reports understanding that future health concerns related to or unrelated to transplantation may not be covered by patient's insurance.  Resources and information provided and reviewed.     Patient provides verbal permission to release any necessary information to outside resources for patient care and discharge planning.  Resources and information provided are reviewed.      Dialysis Adherence: Patient reports never being on dialysis.      Infusion Service: patient utilizing? no  Home Health: patient utilizing? no  DME: no  Pulmonary/Cardiac Rehab: none   ADLS:   Patient reports being independent at this time and able to drive.      Adherence:   Patient reports high levels of adherence with doctors appointments and medications.  Adherence education and counseling provided.     Per History Section:  Past Medical History:   Diagnosis Date    Bulging disc     Cirrhosis     Colon polyp 12/29/2017    Rpt 5 yrs    EV (esophageal varices)     Skin cancer 03/2017    Thrombocytopenia      Social History     Tobacco Use    Smoking status: Never    Smokeless tobacco: Never   Substance Use Topics    Alcohol use: Not Currently     Alcohol/week: 0.0 standard drinks     Social History     Substance and Sexual Activity   Drug Use No     Social History     Substance and Sexual Activity   Sexual  "Activity Not on file       Per Today's Psychosocial:  Tobacco: none, patient denies any use.  Alcohol: Patient reports that his last alcoholic drink was in January of 2022. When SW mentioned positive Peth (19, 9 days ago and 60, 2 months ago), patient said that he had one mixed drink in May of 2022 and nothing since then.  He vehemently denied any alcohol use since then.  Per patient, he was drinking "3 to 4 vodka cranberry" per day until January 2022 but was unable to specify how much vodka.  Patient has participated in AA meetings in the past and not found them very helpful.  Patients reason to attend AA Meetings were because he was made to participate in order to get his drivers licence reinstated after receiving DUI's in 2020 and 2018. Patient said he will "just do AA meetings" if he had to as it is easy to do.  SW reminded patient that AA meetings in the past had not helped him maintain his sobriety and that he might have to engage in Intensive Outpatient Program in order to maintain sobriety and patient said he was not sure if "it was worth it".  When asked to elaborate, patient shared that he is not in the best of health and is 67 years of age.  He was not sure if going through all the testing for transplant and going through IOP would be worth it, if he only had a year or so to live.  SW provided supportive listening and encouraged patient to think positively and to focus on things he can do I order to prepare himself for a healthier life, be it life without or with transplant.  Patient got emotional and verbalized understanding.     Patient was also not clear about when he first received diagnosis of liver disease but said that it was around 12 years ago when he was told not to drink alcohol.    Illicit Drugs/Non-prescribed Medications: none, patient denies any use.    Patient states clear understanding of the potential impact of substance use as it relates to transplant candidacy and is aware of possible " random substance screening.  Substance abstinence/cessation counseling, education and resources provided and reviewed.     Arrests/DWI/Treatment/Rehab: yes, patient has DUI's from 2018 and 2020 and have been resolved with community service and AA Meeting participation.    Psychiatric History:    Mental Health:  denies any mental health concerns.  Psychiatrist/Counselor: none  Medications:  none  Suicide/Homicide Issues: denies SI/HI   Safety at home: yes, patient shares feeling safe.    Knowledge: Patient states having clear understanding and realistic expectations regarding the potential risks and potential benefits of organ transplantation and organ donation and agrees to discuss with health care team members and support system members, as well as to utilize available resources and express questions and/or concerns in order to further facilitate the pt informed decision-making.  Resources and information provided and reviewed.    Patient is aware of [facility] affiliation and/or partnership with agencies in home health care, LTAC, SNF, [facility], and other hospitals and clinics.    Understanding: Patient reports having a clear understanding of the many lifetime commitments involved with being a transplant recipient, including costs, compliance, medications, lab work, procedures, appointments, concrete and financial planning, preparedness, timely and appropriate communication of concerns, abstinence (ETOH, tobacco, illicit non-prescribed drugs), adherence to all health care team recommendations, support system and caregiver involvement, appropriate and timely resource utilization and follow-through, mental health counseling as needed/recommended, and patient and caregiver responsibilities.  Social Service Handbook, resources and detailed educational information provided and reviewed.  Educational information provided.    Patient also states having a clear understanding of the importance of compliance.      Patient reports  a clear understanding that risks and benefits may be involved with organ transplantation and with organ donation.       Patient also reports clear understanding that psychosocial risk factors may affect patient, and include but are not limited to feelings of depression, generalized anxiety, anxiety regarding dependence on others, post traumatic stress disorder, feelings of guilt and other emotional and/or mental concerns, and/or exacerbation of existing mental health concerns.  Detailed resources provided and discussed.      Patient agrees to access appropriate resources in a timely manner as needed and/or as recommended, and to communicate concerns appropriately.  Patient also reports a clear understanding of treatment options available.     Patient received education in a group setting.   reviewed education, provided additional information, and answered questions.    Feelings or Concerns: Patient shared that it has been stressful going through testing for transplant as he is unsure he is healthy enough to receive one.  Per patient, he gets overwhelmed just thinking about the possibility of transplant due to numerous appointments, testing and now the knowledge that he might have to enroll in IOP.      Coping: Identify Patient & Caregiver Strategies to [place]:   1. Currently & Pre-transplant - fishing, hunting.   2. At the time of surgery - family   3. During post-Transplant & Recovery Period - family    Goals: watch his grandchildren grow.      Interview Behavior: Patient presents as  nervous and overwhelmed .          Transplant Social Work - Candidacy  Assessment/Plan:     Psychosocial Suitability: Patient presents as  high risk  candidate for liver transplant at this time. Based on psychosocial risk factors, patient presents as high risk, due to not having a caregiver plan and recent alcohol use. It appears patient is not being upfront with his alcohol use and lacks awareness into consequence of heavy  alcohol use. Patient also does not appear to want to participate in Substance Abuse Relapse Treatment when recommended to do so.       Patient's protective factors includes: Lack of current mental health concerns, adequate finances and insurance coverage, lack of tobacco and illicit drug usage.      Final determination of transplant candidacy will be made once evaluation is complete and reviewed by the Liver Transplant Committee.      Recommendations/Additional Comments:     -  Maintain abstinence from alcohol use and reach out to SW if resources are necessary for abstinence.    -  Engage in IOP/12 step meetings (or similar).  -  Sign release of information with program of choice and notify SW of enrollment /completion.  -  Send 12 step meeting logs to transplant  or coordinator  -  Recommend periodic random alcohol testing (e.g. serum alcohol, urine alcohol, PETH) moving forward to gauge and monitor sobriety.  -  Caregiver plan, primary and back up. Bring primary caregiver to appointment.  -  Advance directive.  -  Fund Raising.  -  Local lodging.        Alison Noriega LCSW

## 2022-12-22 NOTE — PROGRESS NOTES
PHARM.D. PRE-TRANSPLANT NOTE:    This patient's medication therapy was evaluated as part of his pre-transplant evaluation.      The following general pharmacologic concerns were noted: pt currently anticoagulated with apixaban (AFib), consider changing to reversible anticoagulation (warfarin) prior to transplant    The following concerns for post-operative pain management were noted: none    The following pharmacologic concerns related to HCV therapy were noted: AFib      This patient's medication profile was reviewed for considerations for DAA Hepatitis C therapy:    [x]  No current inducers of CYP 3A4 or PGP  [x]  No amiodarone on this patient's EMR profile in the last 24 months  [YES]  Past or current atrial fibrillation on this patient's EMR profile       Current Outpatient Medications   Medication Sig Dispense Refill    amLODIPine (NORVASC) 10 MG tablet       apixaban (ELIQUIS) 5 mg Tab Take 1 tablet (5 mg total) by mouth 2 (two) times daily. 60 tablet 11    ascorbic acid, vitamin C, (VITAMIN C) 500 MG tablet Take 500 mg by mouth.      cyanocobalamin (VITAMIN B-12) 100 MCG tablet Take 100 mcg by mouth once daily.      fish oil-omega-3 fatty acids 300-1,000 mg capsule Take 1 g by mouth 2 (two) times daily.      furosemide (LASIX) 40 MG tablet Take 1 tablet (40 mg total) by mouth 2 (two) times a day. 30 tablet 6    nadoloL (CORGARD) 20 MG tablet Take 2 tablets (40 mg total) by mouth every evening. 90 tablet 2    pantoprazole (PROTONIX) 40 MG tablet Take 1 tablet (40 mg total) by mouth once daily. 90 tablet 0    spironolactone (ALDACTONE) 50 MG tablet Take 1 tablet (50 mg total) by mouth once daily. 90 tablet 3    tamsulosin (FLOMAX) 0.4 mg Cap Take 1 capsule (0.4 mg total) by mouth every evening. 30 capsule 6     No current facility-administered medications for this visit.           I am available for consultation and can be contacted, as needed by the other members of the Transplant team.

## 2022-12-23 ENCOUNTER — HOSPITAL ENCOUNTER (OUTPATIENT)
Dept: RADIOLOGY | Facility: HOSPITAL | Age: 67
Discharge: HOME | End: 2022-12-23
Attending: INTERNAL MEDICINE
Payer: COMMERCIAL

## 2022-12-23 ENCOUNTER — TELEPHONE (OUTPATIENT)
Dept: HEPATOLOGY | Facility: CLINIC | Age: 67
End: 2022-12-23
Payer: COMMERCIAL

## 2022-12-23 ENCOUNTER — PATIENT MESSAGE (OUTPATIENT)
Dept: HEPATOLOGY | Facility: CLINIC | Age: 67
End: 2022-12-23
Payer: COMMERCIAL

## 2022-12-23 ENCOUNTER — TELEPHONE (OUTPATIENT)
Dept: TRANSPLANT | Facility: CLINIC | Age: 67
End: 2022-12-23
Payer: COMMERCIAL

## 2022-12-23 ENCOUNTER — HOSPITAL ENCOUNTER (OUTPATIENT)
Dept: CARDIOLOGY | Facility: HOSPITAL | Age: 67
Discharge: HOME | End: 2022-12-23
Attending: INTERNAL MEDICINE
Payer: COMMERCIAL

## 2022-12-23 VITALS
HEIGHT: 70 IN | DIASTOLIC BLOOD PRESSURE: 70 MMHG | BODY MASS INDEX: 39.51 KG/M2 | SYSTOLIC BLOOD PRESSURE: 140 MMHG | HEART RATE: 75 BPM | WEIGHT: 276 LBS

## 2022-12-23 DIAGNOSIS — Z76.82 ORGAN TRANSPLANT CANDIDATE: ICD-10-CM

## 2022-12-23 DIAGNOSIS — I48.19 PERSISTENT ATRIAL FIBRILLATION: ICD-10-CM

## 2022-12-23 LAB
ASCENDING AORTA: 3.17 CM
AV INDEX (PROSTH): 0.82
AV MEAN GRADIENT: 3 MMHG
AV PEAK GRADIENT: 6 MMHG
AV VALVE AREA: 3.04 CM2
AV VELOCITY RATIO: 0.66
BSA FOR ECHO PROCEDURE: 2.49 M2
CV ECHO LV RWT: 0.37 CM
DOP CALC AO PEAK VEL: 1.25 M/S
DOP CALC AO VTI: 24.66 CM
DOP CALC LVOT AREA: 3.7 CM2
DOP CALC LVOT DIAMETER: 2.17 CM
DOP CALC LVOT PEAK VEL: 0.83 M/S
DOP CALC LVOT STROKE VOLUME: 75.04 CM3
DOP CALCLVOT PEAK VEL VTI: 20.3 CM
E/E' RATIO: 9.7 M/S
ECHO LV POSTERIOR WALL: 0.99 CM (ref 0.6–1.1)
EJECTION FRACTION: 50 %
FRACTIONAL SHORTENING: 31 % (ref 28–44)
INTERVENTRICULAR SEPTUM: 1.09 CM (ref 0.6–1.1)
LA MAJOR: 7.75 CM
LA MINOR: 7.31 CM
LA WIDTH: 4.72 CM
LEFT ATRIUM SIZE: 4.74 CM
LEFT ATRIUM VOLUME INDEX MOD: 63.4 ML/M2
LEFT ATRIUM VOLUME INDEX: 59.9 ML/M2
LEFT ATRIUM VOLUME MOD: 151.48 CM3
LEFT ATRIUM VOLUME: 143.07 CM3
LEFT INTERNAL DIMENSION IN SYSTOLE: 3.71 CM (ref 2.1–4)
LEFT VENTRICLE DIASTOLIC VOLUME INDEX: 59.45 ML/M2
LEFT VENTRICLE DIASTOLIC VOLUME: 142.08 ML
LEFT VENTRICLE MASS INDEX: 91 G/M2
LEFT VENTRICLE SYSTOLIC VOLUME INDEX: 24.4 ML/M2
LEFT VENTRICLE SYSTOLIC VOLUME: 58.34 ML
LEFT VENTRICULAR INTERNAL DIMENSION IN DIASTOLE: 5.41 CM (ref 3.5–6)
LEFT VENTRICULAR MASS: 218.46 G
LV LATERAL E/E' RATIO: 8.82 M/S
LV SEPTAL E/E' RATIO: 10.78 M/S
MV PEAK E VEL: 0.97 M/S
PISA TR MAX VEL: 2.51 M/S
QEF: 48 %
RA MAJOR: 6.82 CM
RA PRESSURE: 3 MMHG
RA WIDTH: 5.77 CM
RIGHT VENTRICULAR END-DIASTOLIC DIMENSION: 4.66 CM
RV TISSUE DOPPLER FREE WALL SYSTOLIC VELOCITY 1 (APICAL 4 CHAMBER VIEW): 8.93 CM/S
SINUS: 3.23 CM
STJ: 2.56 CM
TDI LATERAL: 0.11 M/S
TDI SEPTAL: 0.09 M/S
TDI: 0.1 M/S
TR MAX PG: 25 MMHG
TRICUSPID ANNULAR PLANE SYSTOLIC EXCURSION: 2.02 CM
TV REST PULMONARY ARTERY PRESSURE: 28 MMHG

## 2022-12-23 PROCEDURE — 74160 CT ABDOMEN W/CONTRAST: CPT | Mod: 26,TXP,, | Performed by: RADIOLOGY

## 2022-12-23 PROCEDURE — 93306 TTE W/DOPPLER COMPLETE: CPT | Mod: 26,TXP,, | Performed by: INTERNAL MEDICINE

## 2022-12-23 PROCEDURE — 71046 XR CHEST PA AND LATERAL: ICD-10-PCS | Mod: 26,TXP,, | Performed by: RADIOLOGY

## 2022-12-23 PROCEDURE — 25500020 PHARM REV CODE 255: Mod: TXP | Performed by: INTERNAL MEDICINE

## 2022-12-23 PROCEDURE — 93306 ECHO (CUPID ONLY): ICD-10-PCS | Mod: 26,TXP,, | Performed by: INTERNAL MEDICINE

## 2022-12-23 PROCEDURE — 93306 TTE W/DOPPLER COMPLETE: CPT | Mod: TXP

## 2022-12-23 PROCEDURE — 74160 CT ABDOMEN WITH CONTRAST: ICD-10-PCS | Mod: 26,TXP,, | Performed by: RADIOLOGY

## 2022-12-23 PROCEDURE — 74160 CT ABDOMEN W/CONTRAST: CPT | Mod: TC,TXP

## 2022-12-23 PROCEDURE — 71046 X-RAY EXAM CHEST 2 VIEWS: CPT | Mod: 26,TXP,, | Performed by: RADIOLOGY

## 2022-12-23 PROCEDURE — 71046 X-RAY EXAM CHEST 2 VIEWS: CPT | Mod: TC,TXP

## 2022-12-23 RX ADMIN — IOHEXOL 100 ML: 350 INJECTION, SOLUTION INTRAVENOUS at 08:12

## 2022-12-23 NOTE — TELEPHONE ENCOUNTER
I hit the wrong button while reviewing CT report. It is not OK. There is a lesion in the liver that is concerning for HCC, but does not meet all criteria for HCC.  So, an MRI is recommended by reading radiologist.     Please get an Abd MRI w wo contrast now.     Thanks.  Deanna Cota

## 2022-12-27 ENCOUNTER — PATIENT MESSAGE (OUTPATIENT)
Dept: CARDIOLOGY | Facility: CLINIC | Age: 67
End: 2022-12-27
Payer: COMMERCIAL

## 2022-12-27 DIAGNOSIS — I27.20 PULMONARY HYPERTENSION: ICD-10-CM

## 2022-12-27 DIAGNOSIS — I48.19 PERSISTENT ATRIAL FIBRILLATION: Primary | ICD-10-CM

## 2022-12-27 DIAGNOSIS — Z76.82 ORGAN TRANSPLANT CANDIDATE: ICD-10-CM

## 2022-12-27 NOTE — PROGRESS NOTES
Dr. Cota,  See attached echocardiogram, PA pressures have improved thus no need for right heart catheterization in my opinion for transplant workup at this time.    -Cody Louis

## 2022-12-28 ENCOUNTER — TELEPHONE (OUTPATIENT)
Dept: HEPATOLOGY | Facility: CLINIC | Age: 67
End: 2022-12-28
Payer: COMMERCIAL

## 2022-12-28 ENCOUNTER — CONFERENCE (OUTPATIENT)
Dept: TRANSPLANT | Facility: CLINIC | Age: 67
End: 2022-12-28
Payer: COMMERCIAL

## 2022-12-28 NOTE — TELEPHONE ENCOUNTER
----- Message from Cody Louis MD sent at 12/27/2022  1:13 PM CST -----  Dr. Cota,  See attached echocardiogram, PA pressures have improved thus no need for right heart catheterization in my opinion for transplant workup at this time.    -Cody Louis

## 2022-12-28 NOTE — TELEPHONE ENCOUNTER
Pt's case discussed in the liver committee meeting today. Pt seen by cardiology for consult on 12/15/22  to be considered for heart cath. On his most recent TTE from 12/23/22 the PA pressure is 28 mmHg. Input requested from anesthesia on appropriate cardiac testing. Per committee, complete evaluation with dobutamine stress test.

## 2022-12-29 DIAGNOSIS — I49.3 PVC'S (PREMATURE VENTRICULAR CONTRACTIONS): Primary | ICD-10-CM

## 2022-12-30 ENCOUNTER — TELEPHONE (OUTPATIENT)
Dept: TRANSPLANT | Facility: CLINIC | Age: 67
End: 2022-12-30
Payer: COMMERCIAL

## 2022-12-30 DIAGNOSIS — K76.9 LIVER DISEASE, UNSPECIFIED: Primary | ICD-10-CM

## 2023-01-06 ENCOUNTER — HOSPITAL ENCOUNTER (OUTPATIENT)
Dept: CARDIOLOGY | Facility: CLINIC | Age: 68
Discharge: HOME | End: 2023-01-06
Payer: COMMERCIAL

## 2023-01-06 ENCOUNTER — OFFICE VISIT (OUTPATIENT)
Dept: ELECTROPHYSIOLOGY | Facility: CLINIC | Age: 68
End: 2023-01-06
Payer: COMMERCIAL

## 2023-01-06 VITALS
BODY MASS INDEX: 40.71 KG/M2 | SYSTOLIC BLOOD PRESSURE: 122 MMHG | WEIGHT: 284.38 LBS | DIASTOLIC BLOOD PRESSURE: 80 MMHG | HEIGHT: 70 IN | HEART RATE: 102 BPM

## 2023-01-06 DIAGNOSIS — K70.31 ASCITES DUE TO ALCOHOLIC CIRRHOSIS: ICD-10-CM

## 2023-01-06 DIAGNOSIS — Z76.82 LIVER TRANSPLANT CANDIDATE: ICD-10-CM

## 2023-01-06 DIAGNOSIS — Z76.82 ORGAN TRANSPLANT CANDIDATE: ICD-10-CM

## 2023-01-06 DIAGNOSIS — G47.33 OSA (OBSTRUCTIVE SLEEP APNEA): ICD-10-CM

## 2023-01-06 DIAGNOSIS — E80.6 HYPERBILIRUBINEMIA: ICD-10-CM

## 2023-01-06 DIAGNOSIS — K70.30 ALCOHOLIC CIRRHOSIS OF LIVER WITHOUT ASCITES: ICD-10-CM

## 2023-01-06 DIAGNOSIS — M54.50 CHRONIC MIDLINE LOW BACK PAIN WITHOUT SCIATICA: ICD-10-CM

## 2023-01-06 DIAGNOSIS — I49.3 PVC'S (PREMATURE VENTRICULAR CONTRACTIONS): ICD-10-CM

## 2023-01-06 DIAGNOSIS — E88.09 HYPOALBUMINEMIA: ICD-10-CM

## 2023-01-06 DIAGNOSIS — I10 PRIMARY HYPERTENSION: ICD-10-CM

## 2023-01-06 DIAGNOSIS — I48.11 LONGSTANDING PERSISTENT ATRIAL FIBRILLATION: Primary | ICD-10-CM

## 2023-01-06 DIAGNOSIS — I27.20 PULMONARY HYPERTENSION: ICD-10-CM

## 2023-01-06 DIAGNOSIS — G89.29 CHRONIC MIDLINE LOW BACK PAIN WITHOUT SCIATICA: ICD-10-CM

## 2023-01-06 DIAGNOSIS — I85.00 ESOPHAGEAL VARICES WITHOUT BLEEDING, UNSPECIFIED ESOPHAGEAL VARICES TYPE: ICD-10-CM

## 2023-01-06 DIAGNOSIS — D68.9 COAGULOPATHY: ICD-10-CM

## 2023-01-06 DIAGNOSIS — E66.01 CLASS 3 SEVERE OBESITY WITH BODY MASS INDEX (BMI) OF 40.0 TO 44.9 IN ADULT, UNSPECIFIED OBESITY TYPE, UNSPECIFIED WHETHER SERIOUS COMORBIDITY PRESENT: ICD-10-CM

## 2023-01-06 PROCEDURE — 1100F PTFALLS ASSESS-DOCD GE2>/YR: CPT | Mod: CPTII,NTX,S$GLB, | Performed by: STUDENT IN AN ORGANIZED HEALTH CARE EDUCATION/TRAINING PROGRAM

## 2023-01-06 PROCEDURE — 99205 OFFICE O/P NEW HI 60 MIN: CPT | Mod: NTX,S$GLB,, | Performed by: STUDENT IN AN ORGANIZED HEALTH CARE EDUCATION/TRAINING PROGRAM

## 2023-01-06 PROCEDURE — 99205 PR OFFICE/OUTPT VISIT, NEW, LEVL V, 60-74 MIN: ICD-10-PCS | Mod: NTX,S$GLB,, | Performed by: STUDENT IN AN ORGANIZED HEALTH CARE EDUCATION/TRAINING PROGRAM

## 2023-01-06 PROCEDURE — 93005 ELECTROCARDIOGRAM TRACING: CPT | Mod: NTX,S$GLB,, | Performed by: STUDENT IN AN ORGANIZED HEALTH CARE EDUCATION/TRAINING PROGRAM

## 2023-01-06 PROCEDURE — 3074F SYST BP LT 130 MM HG: CPT | Mod: CPTII,NTX,S$GLB, | Performed by: STUDENT IN AN ORGANIZED HEALTH CARE EDUCATION/TRAINING PROGRAM

## 2023-01-06 PROCEDURE — 3288F FALL RISK ASSESSMENT DOCD: CPT | Mod: CPTII,NTX,S$GLB, | Performed by: STUDENT IN AN ORGANIZED HEALTH CARE EDUCATION/TRAINING PROGRAM

## 2023-01-06 PROCEDURE — 3079F DIAST BP 80-89 MM HG: CPT | Mod: CPTII,NTX,S$GLB, | Performed by: STUDENT IN AN ORGANIZED HEALTH CARE EDUCATION/TRAINING PROGRAM

## 2023-01-06 PROCEDURE — 3008F PR BODY MASS INDEX (BMI) DOCUMENTED: ICD-10-PCS | Mod: CPTII,NTX,S$GLB, | Performed by: STUDENT IN AN ORGANIZED HEALTH CARE EDUCATION/TRAINING PROGRAM

## 2023-01-06 PROCEDURE — 3079F PR MOST RECENT DIASTOLIC BLOOD PRESSURE 80-89 MM HG: ICD-10-PCS | Mod: CPTII,NTX,S$GLB, | Performed by: STUDENT IN AN ORGANIZED HEALTH CARE EDUCATION/TRAINING PROGRAM

## 2023-01-06 PROCEDURE — 1126F AMNT PAIN NOTED NONE PRSNT: CPT | Mod: CPTII,NTX,S$GLB, | Performed by: STUDENT IN AN ORGANIZED HEALTH CARE EDUCATION/TRAINING PROGRAM

## 2023-01-06 PROCEDURE — 3008F BODY MASS INDEX DOCD: CPT | Mod: CPTII,NTX,S$GLB, | Performed by: STUDENT IN AN ORGANIZED HEALTH CARE EDUCATION/TRAINING PROGRAM

## 2023-01-06 PROCEDURE — 3074F PR MOST RECENT SYSTOLIC BLOOD PRESSURE < 130 MM HG: ICD-10-PCS | Mod: CPTII,NTX,S$GLB, | Performed by: STUDENT IN AN ORGANIZED HEALTH CARE EDUCATION/TRAINING PROGRAM

## 2023-01-06 PROCEDURE — 1159F MED LIST DOCD IN RCRD: CPT | Mod: CPTII,NTX,S$GLB, | Performed by: STUDENT IN AN ORGANIZED HEALTH CARE EDUCATION/TRAINING PROGRAM

## 2023-01-06 PROCEDURE — 93005 RHYTHM STRIP: ICD-10-PCS | Mod: NTX,S$GLB,, | Performed by: STUDENT IN AN ORGANIZED HEALTH CARE EDUCATION/TRAINING PROGRAM

## 2023-01-06 PROCEDURE — 93010 ELECTROCARDIOGRAM REPORT: CPT | Mod: NTX,S$GLB,, | Performed by: INTERNAL MEDICINE

## 2023-01-06 PROCEDURE — 1159F PR MEDICATION LIST DOCUMENTED IN MEDICAL RECORD: ICD-10-PCS | Mod: CPTII,NTX,S$GLB, | Performed by: STUDENT IN AN ORGANIZED HEALTH CARE EDUCATION/TRAINING PROGRAM

## 2023-01-06 PROCEDURE — 3288F PR FALLS RISK ASSESSMENT DOCUMENTED: ICD-10-PCS | Mod: CPTII,NTX,S$GLB, | Performed by: STUDENT IN AN ORGANIZED HEALTH CARE EDUCATION/TRAINING PROGRAM

## 2023-01-06 PROCEDURE — 93010 RHYTHM STRIP: ICD-10-PCS | Mod: NTX,S$GLB,, | Performed by: INTERNAL MEDICINE

## 2023-01-06 PROCEDURE — 99999 PR PBB SHADOW E&M-EST. PATIENT-LVL IV: ICD-10-PCS | Mod: PBBFAC,TXP,, | Performed by: STUDENT IN AN ORGANIZED HEALTH CARE EDUCATION/TRAINING PROGRAM

## 2023-01-06 PROCEDURE — 1100F PR PT FALLS ASSESS DOC 2+ FALLS/FALL W/INJURY/YR: ICD-10-PCS | Mod: CPTII,NTX,S$GLB, | Performed by: STUDENT IN AN ORGANIZED HEALTH CARE EDUCATION/TRAINING PROGRAM

## 2023-01-06 PROCEDURE — 1126F PR PAIN SEVERITY QUANTIFIED, NO PAIN PRESENT: ICD-10-PCS | Mod: CPTII,NTX,S$GLB, | Performed by: STUDENT IN AN ORGANIZED HEALTH CARE EDUCATION/TRAINING PROGRAM

## 2023-01-06 PROCEDURE — 99999 PR PBB SHADOW E&M-EST. PATIENT-LVL IV: CPT | Mod: PBBFAC,TXP,, | Performed by: STUDENT IN AN ORGANIZED HEALTH CARE EDUCATION/TRAINING PROGRAM

## 2023-01-06 NOTE — PROGRESS NOTES
Electrophysiology Clinic Note    Reason for new patient visit: Evaluation and recommendations regarding longstanding persistent atrial fibrillation.     PRESENTING HISTORY:     History of Present Illness:  Mr. Juan Carlos Yoo Sr. is a hossein 67-year-old gentleman who presents today for evaluation and recommendations regarding longstanding persistent atrial fibrillation. He has a past medical history significant for longstanding persistent atrial fibrillation, cirrhosis with ascites and esophageal varices, pulmonary hypertension, hypertension, chronic low back pain, thrombocytopenia, LOLIS, and obesity.    He is followed in general cardiology clinic with Dr. Louis and was recently seen following an appointment for assessment for a hepatic transplant. He reports that he was initially diagnosed with atrial fibrillation approximately 20 years ago, and to his knowledge, he has remained in atrial fibrillation since this time. He describes periods of tachycardia with administration of what is described as adenosine, although he reports that he has had several administrations of adenosine without cardioverting. It is unclear what his rhythm was at these encounters, although he was told he was in atrial fibrillation. His atrial fibrillation was noted again during an outpatient elective colonoscopy earlier in 2022, and procedure was deferred secondary to rapid heart rates at that time. He tells me that a cardioversion was attempted; however, he immediately returned to atrial fibrillation per his report. He is presently being evaluated for a liver transplant with the hepatology service. He remains on apixaban 5mg po BID for oral anticoagulation with no adverse bleeding events reported.     In discussion with Mr. Yoo today, he tells me that he is feeling overall quite well. He denies any episodes of dizziness, lightheadedness, syncope/presyncope, palpitations, chest pain or chest discomfort, nausea or vomiting, orthopnea, lower  extremity edema, or PND. He reports that he occasionally feels slight palpitations, but is unable to tell when he is in atrial fibrillation. He reports baseline mild shortness of breath and dyspnea with exertion, and reports that this has worsened as his cirrhosis has worsened. He can climb one flight of stairs prior to needing to take a break, but develops shortness of breath beyond one flight of stairs. He reports continued abdominal distension with his cirrhosis. Of note, his brother has atrial fibrillation and he feels that several family members also have atrial fibrillation.     Review of Systems:  Review of Systems   Constitutional:  Negative for activity change.   HENT:  Negative for nasal congestion, nosebleeds, postnasal drip, rhinorrhea, sinus pressure/congestion, sneezing and sore throat.    Respiratory:  Positive for shortness of breath. Negative for apnea, cough, chest tightness and wheezing.    Cardiovascular:  Positive for palpitations. Negative for chest pain and leg swelling.   Gastrointestinal:  Positive for abdominal distention. Negative for abdominal pain, blood in stool, change in bowel habit, constipation, diarrhea, nausea, vomiting and change in bowel habit.   Genitourinary:  Negative for dysuria and hematuria.   Musculoskeletal:  Positive for arthralgias and back pain. Negative for gait problem.   Neurological:  Negative for dizziness, seizures, syncope, weakness, light-headedness, headaches, coordination difficulties and coordination difficulties.      PAST HISTORY:     Past Medical History:   Diagnosis Date    Bulging disc     Cirrhosis     Colon polyp 12/29/2017    Rpt 5 yrs    EV (esophageal varices)     Skin cancer 03/2017    Thrombocytopenia        Past Surgical History:   Procedure Laterality Date    CHOLECYSTECTOMY      COLONOSCOPY      COLONOSCOPY N/A 12/29/2017    ta rpt 2022    HERNIA REPAIR      SKIN BIOPSY  03/2017    TONSILLECTOMY      UPPER GASTROINTESTINAL ENDOSCOPY  2011     "EV    UPPER GASTROINTESTINAL ENDOSCOPY  2016    VASECTOMY         Family History:  Family History   Problem Relation Age of Onset    Hyperlipidemia Brother     Cancer Maternal Uncle         Leukemia    Heart disease Maternal Uncle     Ovarian cancer Sister     Colon cancer Neg Hx        Social History:  He  reports that he has never smoked. He has never used smokeless tobacco. He reports that he does not currently use alcohol. He reports that he does not use drugs.      MEDICATIONS & ALLERGIES:     Review of patient's allergies indicates:  No Known Allergies    Current Outpatient Medications on File Prior to Visit   Medication Sig Dispense Refill    amLODIPine (NORVASC) 10 MG tablet       apixaban (ELIQUIS) 5 mg Tab Take 1 tablet (5 mg total) by mouth 2 (two) times daily. 60 tablet 11    ascorbic acid, vitamin C, (VITAMIN C) 500 MG tablet Take 500 mg by mouth.      cyanocobalamin (VITAMIN B-12) 100 MCG tablet Take 100 mcg by mouth once daily.      fish oil-omega-3 fatty acids 300-1,000 mg capsule Take 1 g by mouth 2 (two) times daily.      furosemide (LASIX) 40 MG tablet Take 1 tablet (40 mg total) by mouth 2 (two) times a day. 30 tablet 6    nadoloL (CORGARD) 20 MG tablet Take 2 tablets (40 mg total) by mouth every evening. 90 tablet 2    pantoprazole (PROTONIX) 40 MG tablet Take 1 tablet (40 mg total) by mouth once daily. 90 tablet 0    spironolactone (ALDACTONE) 50 MG tablet Take 1 tablet (50 mg total) by mouth once daily. 90 tablet 3    tamsulosin (FLOMAX) 0.4 mg Cap Take 1 capsule (0.4 mg total) by mouth every evening. 30 capsule 6     No current facility-administered medications on file prior to visit.        OBJECTIVE:     Vital Signs:  /80   Pulse 102   Ht 5' 10" (1.778 m)   Wt 129 kg (284 lb 6.3 oz)   BMI 40.81 kg/m²     Physical Exam:  Physical Exam  Constitutional:       General: He is not in acute distress.     Appearance: Normal appearance. He is obese. He is not ill-appearing or diaphoretic. "      Comments: Well-appearing man in NAD.   HENT:      Head: Normocephalic and atraumatic.      Nose: Nose normal.      Mouth/Throat:      Mouth: Mucous membranes are moist.      Pharynx: Oropharynx is clear.   Eyes:      Pupils: Pupils are equal, round, and reactive to light.   Cardiovascular:      Rate and Rhythm: Normal rate. Rhythm irregular.      Pulses: Normal pulses.      Heart sounds: Normal heart sounds. No murmur heard.    No friction rub. No gallop.      Comments: Irregularly irregular rhythm on right radial artery palpation.   Pulmonary:      Effort: Pulmonary effort is normal. No respiratory distress.      Breath sounds: Normal breath sounds. No wheezing, rhonchi or rales.   Chest:      Chest wall: No tenderness.   Abdominal:      General: There is distension.      Palpations: Abdomen is soft.      Tenderness: There is no abdominal tenderness.   Musculoskeletal:         General: No swelling or tenderness.      Cervical back: Normal range of motion.      Right lower leg: No edema.      Left lower leg: No edema.   Skin:     General: Skin is warm and dry.      Findings: No erythema, lesion or rash.   Neurological:      General: No focal deficit present.      Mental Status: He is alert and oriented to person, place, and time. Mental status is at baseline.      Motor: No weakness.      Gait: Gait normal.   Psychiatric:         Mood and Affect: Mood normal.         Behavior: Behavior normal.        Laboratory Data:  Lab Results   Component Value Date    WBC 4.86 12/13/2022    HGB 13.8 (L) 12/13/2022    HCT 40.9 12/13/2022    MCV 95 12/13/2022     (L) 12/13/2022     Lab Results   Component Value Date     12/13/2022     12/13/2022    K 3.5 12/13/2022    CL 99 12/13/2022    CO2 32 (H) 12/13/2022    BUN 14 12/13/2022    CREATININE 1.0 12/23/2022    CALCIUM 9.1 12/13/2022    MG 1.4 (L) 11/29/2022     Lab Results   Component Value Date    INR 1.3 (H) 12/13/2022    INR 1.4 (H) 10/17/2022    INR  1.4 (H) 10/17/2022       Pertinent Cardiac Data:  ECG: Atrial fibrillation with rapid ventricular rate, ventricular rate of 102 bpm, QRS 92 ms, QT/QTc 368/479 ms.     Resting 2D Transthoracic Echocardiogram - 6/15/2022:  The left ventricle is normal in size with normal systolic function.  The estimated ejection fraction is 55-60%.  The quantitatively derived ejection fraction is 56%.  A diastolic pattern consistent with atrial fibrillation observed.  Severe left atrial enlargement.  Mild right ventricular enlargement with normal right ventricular systolic function.  Severe right atrial enlargement.  Mild aortic regurgitation.  Mild mitral regurgitation.  The estimated PA systolic pressure is 53 mmHg.  There is moderate pulmonary hypertension.  Mild tricuspid regurgitation.  Intermediate central venous pressure (8 mmHg).    24-Hour Holter Monitor - 6/27/2022:  1. Rare hook-up related artifact is present. The study quality is otherwise adequate.  2. The predominant rhythm during the recording interval is atrial fibrillation with a controlled ventricular response  3.  Ventricular rates range from 62 to 154 in this study with an average ventricular rate of 88 beats per minute.    4. There is no evidence of advanced atrioventricular block. The maximum R-R interval is 1500 ms.   5. Intraventricular conduction duration is normal.   6. There are occasional isolated wide complex beats and no wide complex couplets.  The majority of these beats appear to be ventricular ectopy.   7. There is no evidence of ventricular tachycardia in this study. The ventricular ectopy burden is <1%   8. The symptom diary was not completed as directed.    Resting 2D Transthoracic Echocardiogram - 12/23/2022:  The left ventricle is normal in size with low normal systolic function.  The estimated ejection fraction is 50%.  Indeterminate left ventricular diastolic function.  Mild mitral regurgitation.  Normal right ventricular size with mildly  reduced right ventricular systolic function.  There is right ventricular hypertrophy.  Moderate to severe left atrial enlargement.  The quantitatively derived ejection fraction is 48%.  Moderate right atrial enlargement.  Mild tricuspid regurgitation.  Mild aortic regurgitation.  Normal central venous pressure (3 mmHg).  The estimated PA systolic pressure is 28 mmHg.      ASSESSMENT & PLAN:   Mr. Juan Carlos Yoo Sr. is a hossein 67-year-old gentleman who presents today for evaluation and recommendations regarding longstanding persistent atrial fibrillation. He has a past medical history significant for longstanding persistent atrial fibrillation, cirrhosis with ascites and esophageal varices, pulmonary hypertension, hypertension, chronic low back pain, thrombocytopenia, LOLIS, and obesity.    - We discussed the pathophysiology of atrial fibrillation; specifically, we discussed longstanding persistent atrial fibrillation and the concept that some patients may remain in atrial fibrillation permanently. As he describes being in atrial fibrillation for the past 20 years, he may not be able to maintain sinus rhythm. We discussed that atrial fibrillation has an increased risk of CVA.  - We discussed a trial of ANAHI and cardioversion in order to attempt to restore sinus rhythm. We discussed this procedure in detail, including possible risks and benefits, and alternative options. He voices understanding, but would prefer to focus on his cirrhosis and possible transplant at this time and defers this procedure. We discussed that in the event he develops limiting symptoms that we would more strongly recommend this procedure.  - He remains on nadolol 40mg po daily with overall excellent rate control of his atrial fibrillation. He has never had a trial of antiarrhythmic therapy, with limited options based on his cirrhosis and low-normal systolic function with LVEF 50%. In the event future antiarrhythmic is required, we would recommend  a trial of sotalol.    - His [id number] is 2 (HTN, male gender, age 65-74 years old), portending an annual adjusted risk of CVA of 2.2%. He remains on uninterrupted apixaban therapy with no bleeding events reported.   - We discussed the possibility of catheter ablation with pulmonary vein isolation should he continue to have symptoms and AF despite AAD therapy. He voices understanding, although he would like to continue rate control with medication at this time.  - Possible underlying drivers of atrial fibrillation were addressed at this appointment, including recommendations for weight loss - now a class I recommendation. Review of laboratory data revealed acceptable TSH of 2.208. A request for sleep study was placed today to evaluate his underlying obstructive sleep apnea with fitting of a CPAP mask.      This patient will return to clinic with me in six months with continued hepatology, general cardiology, and PCP visits in the interim. All questions and concerns were addressed at this encounter.     Signing Physician:       CECILIA Damian MD  Electrophysiology Attending

## 2023-01-08 PROBLEM — I48.11 LONGSTANDING PERSISTENT ATRIAL FIBRILLATION: Status: ACTIVE | Noted: 2022-11-29

## 2023-01-09 ENCOUNTER — OFFICE VISIT (OUTPATIENT)
Dept: SLEEP MEDICINE | Facility: CLINIC | Age: 68
End: 2023-01-09
Payer: COMMERCIAL

## 2023-01-09 DIAGNOSIS — G47.30 SLEEP APNEA, UNSPECIFIED TYPE: ICD-10-CM

## 2023-01-09 DIAGNOSIS — G47.33 OSA (OBSTRUCTIVE SLEEP APNEA): ICD-10-CM

## 2023-01-09 PROCEDURE — 99999 PR PBB SHADOW E&M-EST. PATIENT-LVL II: ICD-10-PCS | Mod: PBBFAC,TXP,, | Performed by: PSYCHIATRY & NEUROLOGY

## 2023-01-09 PROCEDURE — 99999 PR PBB SHADOW E&M-EST. PATIENT-LVL II: CPT | Mod: PBBFAC,TXP,, | Performed by: PSYCHIATRY & NEUROLOGY

## 2023-01-09 PROCEDURE — 99204 PR OFFICE/OUTPT VISIT, NEW, LEVL IV, 45-59 MIN: ICD-10-PCS | Mod: NTX,S$GLB,, | Performed by: PSYCHIATRY & NEUROLOGY

## 2023-01-09 PROCEDURE — 99204 OFFICE O/P NEW MOD 45 MIN: CPT | Mod: NTX,S$GLB,, | Performed by: PSYCHIATRY & NEUROLOGY

## 2023-01-09 NOTE — PROGRESS NOTES
Juan Carlos Yoo Sr. is a 67 y.o. male is here to be evaluated for a sleep disorder; referred by Fortunato Shaw MD (seen for persistent AFIB managed pharmacologically).    The patient reports snoring and interrupted sleep since several years ago.   Juan Carlos Yoo Sr. denied  gasping for air, choking , and witnessed apneas.    The patient feels rested upon awakening. Takes occasional very rare naps.     The patient  reports morning headaches and denies  dry mouth on awakening.   Juan Carlos Yoo Sr. reports occasional  nasal congestion.      The patient  denied difficulty  with falling or staying asleep.    Juan Carlos Yoo Sr.  denies symptoms concerning for parasomnia except for occasional somniloquy.  The patient  denies auxiliary symptoms of narcolepsy including sleep onset paralysis, hypnagogic hallucinations, sleep attacks and cataplexy.    Juan Carlos Yoo Sr. denied symptoms concerning for RLS; nocturnal leg movements have not been noticed.   The patient does not experience sleep related leg cramps.         Medications pertinent to sleep  disorders taken currently: no  Previous  medications taken  for sleep disorders:  no    Sleep studies  No  Echo: The quantitatively derived ejection fraction is 48%.    Occupation:retired  Bed partner:   Exercise routine: active - walking  Caffeine:  no beverages per day  Alcohol: not anymore due to AFIB  Smoking:nebrt  EPWORTH SLEEPINESS SCALE TOTAL SCORE  1/9/2023   Total score 8         EPWORTH SLEEPINESS SCALE 1/9/2023   Sitting and reading 1   Watching TV 1   Sitting, inactive in a public place (e.g. a theatre or a meeting) 0   As a passenger in a car for an hour without a break 0   Lying down to rest in the afternoon when circumstances permit 3   Sitting and talking to someone 0   Sitting quietly after a lunch without alcohol 3   In a car, while stopped for a few minutes in traffic 0   Total score 8       PHQ9 9/29/2022   Total Score 0     No flowsheet data found.    EPWORTH  SLEEPINESS SCALE 1/9/2023   Sitting and reading 1   Watching TV 1   Sitting, inactive in a public place (e.g. a theatre or a meeting) 0   As a passenger in a car for an hour without a break 0   Lying down to rest in the afternoon when circumstances permit 3   Sitting and talking to someone 0   Sitting quietly after a lunch without alcohol 3   In a car, while stopped for a few minutes in traffic 0   Total score 8     Sleep Clinic New Patient 1/9/2023   What time do you go to bed on a week day? (Give a range) 12am   What time do you go to bed on a day off? (Give a range) 12am   How long does it take you to fall asleep? (Give a range) 20 minutes   On average, how many times per night do you wake up? 3   How long does it take you to fall back into sleep? (Give a range) 5 minutes   What time do you wake up to start your day on a week day? (Give a range) 6am   What time do you wake up to start your day on a day off? (Give a range) 6am   What time do you get out of bed? (Give a range) 30 minutes   On average, how many hours do you sleep? 6-7   On average, how many naps do you take per day? none   Rate your sleep quality from 0 to 5 (0-poor, 5-great). 3   Have you experienced:  N/a   How much weight have you lost or gained (in lbs.) in the last year? 0   On average, how many times per night do you go to the bathroom?  2   Have you ever had a sleep study/CPAP machine/surgery for sleep apnea? No   Have you ever had a CPAP machine for sleep apnea? No   Have you ever had surgery for sleep apnea? No       Sleep Clinic ROS  1/9/2023   Difficulty breathing through the nose?  Sometimes   Sore throat or dry mouth in the morning? Sometimes   Irregular or very fast heart beat?  Yes   Shortness of breath?  Sometimes   Acid reflux? No   Body aches and pains?  Yes   Morning headaches? No   Dizziness? No   Mood changes?  Sometimes   Do you exercise?  No   Do you feel like moving your legs a lot?  No       DME:         PAST MEDICAL  HISTORY:    Active Ambulatory Problems     Diagnosis Date Noted    LBP radiating to both legs 07/24/2014    Cirrhosis 07/24/2014    Screen for colon cancer 12/29/2017    Hypoalbuminemia 06/15/2022    Coagulopathy 06/15/2022    Hyperbilirubinemia 06/15/2022    PVC's (premature ventricular contractions) 06/15/2022    History of palpitations 06/15/2022    Other specified anemias 06/15/2022    Organ transplant candidate 10/17/2022    Longstanding persistent atrial fibrillation 11/29/2022    Pulmonary hypertension 11/29/2022     Resolved Ambulatory Problems     Diagnosis Date Noted    Healthcare maintenance 10/08/2018    Pulmonary hyperinflation 11/29/2022     Past Medical History:   Diagnosis Date    Bulging disc     Colon polyp 12/29/2017    EV (esophageal varices)     Skin cancer 03/2017    Thrombocytopenia                 PAST SURGICAL HISTORY:    Past Surgical History:   Procedure Laterality Date    CHOLECYSTECTOMY      COLONOSCOPY      COLONOSCOPY N/A 12/29/2017    ta rpt 2022    HERNIA REPAIR      SKIN BIOPSY  03/2017    TONSILLECTOMY      UPPER GASTROINTESTINAL ENDOSCOPY  2011    EV    UPPER GASTROINTESTINAL ENDOSCOPY  2016    VASECTOMY           FAMILY HISTORY:                Family History   Problem Relation Age of Onset    Hyperlipidemia Brother     Cancer Maternal Uncle         Leukemia    Heart disease Maternal Uncle     Ovarian cancer Sister     Colon cancer Neg Hx        SOCIAL HISTORY:          Tobacco:   Social History     Tobacco Use   Smoking Status Never   Smokeless Tobacco Never       alcohol use:    Social History     Substance and Sexual Activity   Alcohol Use Not Currently    Alcohol/week: 0.0 standard drinks                   ALLERGIES:  Review of patient's allergies indicates:  No Known Allergies    CURRENT MEDICATIONS:    Current Outpatient Medications   Medication Sig Dispense Refill    amLODIPine (NORVASC) 10 MG tablet       apixaban (ELIQUIS) 5 mg Tab Take 1 tablet (5 mg total) by mouth 2  (two) times daily. 60 tablet 11    ascorbic acid, vitamin C, (VITAMIN C) 500 MG tablet Take 500 mg by mouth.      cyanocobalamin (VITAMIN B-12) 100 MCG tablet Take 100 mcg by mouth once daily.      fish oil-omega-3 fatty acids 300-1,000 mg capsule Take 1 g by mouth 2 (two) times daily.      furosemide (LASIX) 40 MG tablet Take 1 tablet (40 mg total) by mouth 2 (two) times a day. 30 tablet 6    nadoloL (CORGARD) 20 MG tablet Take 2 tablets (40 mg total) by mouth every evening. 90 tablet 2    pantoprazole (PROTONIX) 40 MG tablet Take 1 tablet (40 mg total) by mouth once daily. 90 tablet 0    spironolactone (ALDACTONE) 50 MG tablet Take 1 tablet (50 mg total) by mouth once daily. 90 tablet 3    tamsulosin (FLOMAX) 0.4 mg Cap Take 1 capsule (0.4 mg total) by mouth every evening. 30 capsule 6     No current facility-administered medications for this visit.                      PHYSICAL EXAM:  There were no vitals taken for this visit.  GENERAL: Normal development, well groomed.  HEENT:   HEENT:  Conjunctivae are non-erythematous; Pupils equal, round, and reactive to light; Nose is symmetrical; Nasal mucosa is pink and moist; Septum is midline; Inferior turbinates are normal; Nasal airflow is normal; Posterior pharynx is pink; Modified Mallampati:IV; Posterior palate is low; Tonsils not visualized; Uvula is normal and pink;Tongue is normal; Dentition is fair; No TMJ tenderness; Jaw opening and protrusion without click and without discomfort.  NECK: Supple. Neck circumference is 18.5 inches. No thyromegaly. No palpable nodes.     SKIN: On face and neck: No abrasions, no rashes, no lesions.  No subcutaneous nodules are palpable.  RESPIRATORY: Chest is clear to auscultation.  Normal chest expansion and non-labored breathing at rest.  CARDIOVASCULAR: Normal S1, S2.  No murmurs, gallops or rubs. No carotid bruits bilaterally.  No edema. No clubbing. No cyanosis.    NEURO: Oriented to time, place and person. Normal attention  span and concentration. Gait normal.    PSYCH: Affect is full. Mood is normal  MUSCULOSKELETAL: Moves 4 extremities. Gait normal.           ASSESSMENT:    1. Sleep Apnea NEC. The patient symptomatically has  snoring and excessive daytime fatigue  with exam findings of crowded airway, elevated BMI, and large neck size. The patient has medical co-morbidities of atrial fibrillation and CHF  which can be worsened by LOLIS. This warrants further investigation for possible obstructive sleep apnea.              PLAN:    Diagnostic: Home Sleep Study. The nature of this procedure and its indication was discussed with the patient. We will notify the patient about sleep study resuts via My Chart.  In lab study was suggested but Juan Carlos Yoo Sr. Would much prefer HST. He understand that there is a change he may need to do other parts of sleep evaluations in lab down the road.       During our discussion today, we talked about the etiology of  LOLIS as well as the potential ramifications of untreated sleep apnea, which could include daytime sleepiness, hypertension, heart disease and/or stroke.  We discussed potential treatment options, which could include weight loss, body positioning, continuous positive airway pressure (CPAP), or referral for surgical consideration. Meanwhile, he  is urged to avoid supine sleep, weight gain and alcoholic beverages since all of these can worsen LOLIS.     The patient was given open opportunity to ask questions and/or express concerns about treatment plan. Two point patient identifier confirmed.       Precautions: The patient was advised to abstain from driving should he feel sleepy or drowsy.    Follow up: MD after the sleep study has been completed.     31-minute visit. >50% spent counseling patient and coordination of care.  The patient was  cautioned against drowsy driving.

## 2023-01-09 NOTE — PATIENT INSTRUCTIONS
SLEEP LAB ([facility] or [facility]) will contact you to schedulethe sleep study. Their number is [phone] (ext 2). Please call them if you do not hear from them in 2 weeks from now.  The [facility] Lab is located on 7th floor of the [facility]; [place] [facility] is located in [facility].    SLEEP CLINIC (my assistant) will call you when the sleep study results are ready - if you have not heard from us by 2 weeks from the date of the study, please call [phone] (ext 1) or you can use [facility] to contact me.    You are advised to abstain from driving should you feel sleepy or drowsy.

## 2023-01-10 ENCOUNTER — CLINICAL SUPPORT (OUTPATIENT)
Dept: INFECTIOUS DISEASES | Facility: CLINIC | Age: 68
End: 2023-01-10
Payer: COMMERCIAL

## 2023-01-10 DIAGNOSIS — Z76.82 ORGAN TRANSPLANT CANDIDATE: ICD-10-CM

## 2023-01-10 PROCEDURE — 99999 PR PBB SHADOW E&M-EST. PATIENT-LVL I: CPT | Mod: PBBFAC,TXP,,

## 2023-01-10 PROCEDURE — 90471 HEPATITIS B (RECOMBINANT) ADJUVANTED, 2 DOSE: ICD-10-PCS | Mod: S$GLB,TXP,, | Performed by: INTERNAL MEDICINE

## 2023-01-10 PROCEDURE — 90739 HEPATITIS B (RECOMBINANT) ADJUVANTED, 2 DOSE: ICD-10-PCS | Mod: S$GLB,TXP,, | Performed by: INTERNAL MEDICINE

## 2023-01-10 PROCEDURE — 90471 IMMUNIZATION ADMIN: CPT | Mod: S$GLB,TXP,, | Performed by: INTERNAL MEDICINE

## 2023-01-10 PROCEDURE — 99999 PR PBB SHADOW E&M-EST. PATIENT-LVL I: ICD-10-PCS | Mod: PBBFAC,TXP,,

## 2023-01-10 PROCEDURE — 90739 HEPB VACC 2/4 DOSE ADULT IM: CPT | Mod: S$GLB,TXP,, | Performed by: INTERNAL MEDICINE

## 2023-01-10 NOTE — PROGRESS NOTES
Patient received the second Heplisav B vaccine in the left deltoid. Pt tolerated well. Pt asked to wait in the clinic 15 minutes after injection in the event of an allergic reaction. Pt verbalized understanding. Pt left in NAD.

## 2023-01-11 ENCOUNTER — TELEPHONE (OUTPATIENT)
Dept: SLEEP MEDICINE | Facility: OTHER | Age: 68
End: 2023-01-11
Payer: COMMERCIAL

## 2023-01-13 DIAGNOSIS — Z76.82 ORGAN TRANSPLANT CANDIDATE: Primary | ICD-10-CM

## 2023-01-17 ENCOUNTER — LAB VISIT (OUTPATIENT)
Dept: LAB | Facility: HOSPITAL | Age: 68
End: 2023-01-17
Attending: INTERNAL MEDICINE
Payer: COMMERCIAL

## 2023-01-17 ENCOUNTER — OFFICE VISIT (OUTPATIENT)
Dept: TRANSPLANT | Facility: CLINIC | Age: 68
End: 2023-01-17
Payer: COMMERCIAL

## 2023-01-17 VITALS
DIASTOLIC BLOOD PRESSURE: 61 MMHG | HEART RATE: 74 BPM | WEIGHT: 282.63 LBS | BODY MASS INDEX: 37.46 KG/M2 | HEIGHT: 73 IN | SYSTOLIC BLOOD PRESSURE: 133 MMHG

## 2023-01-17 DIAGNOSIS — K70.31 ALCOHOLIC CIRRHOSIS OF LIVER WITH ASCITES: ICD-10-CM

## 2023-01-17 DIAGNOSIS — K70.30 ALCOHOLIC CIRRHOSIS OF LIVER WITHOUT ASCITES: ICD-10-CM

## 2023-01-17 DIAGNOSIS — Z76.82 ORGAN TRANSPLANT CANDIDATE: Primary | ICD-10-CM

## 2023-01-17 DIAGNOSIS — D68.9 COAGULOPATHY: ICD-10-CM

## 2023-01-17 DIAGNOSIS — Z76.82 ORGAN TRANSPLANT CANDIDATE: ICD-10-CM

## 2023-01-17 DIAGNOSIS — I48.11 LONGSTANDING PERSISTENT ATRIAL FIBRILLATION: ICD-10-CM

## 2023-01-17 DIAGNOSIS — R06.02 SHORTNESS OF BREATH: ICD-10-CM

## 2023-01-17 DIAGNOSIS — I49.3 PVC'S (PREMATURE VENTRICULAR CONTRACTIONS): ICD-10-CM

## 2023-01-17 DIAGNOSIS — I27.20 PULMONARY HYPERTENSION: ICD-10-CM

## 2023-01-17 LAB
ALBUMIN SERPL BCP-MCNC: 3 G/DL (ref 3.5–5.2)
ALP SERPL-CCNC: 54 U/L (ref 55–135)
ALT SERPL W/O P-5'-P-CCNC: 9 U/L (ref 10–44)
ANION GAP SERPL CALC-SCNC: 7 MMOL/L (ref 8–16)
AST SERPL-CCNC: 19 U/L (ref 10–40)
BASOPHILS # BLD AUTO: 0.05 K/UL (ref 0–0.2)
BASOPHILS NFR BLD: 0.9 % (ref 0–1.9)
BILIRUB SERPL-MCNC: 3.3 MG/DL (ref 0.1–1)
BUN SERPL-MCNC: 13 MG/DL (ref 8–23)
CALCIUM SERPL-MCNC: 9 MG/DL (ref 8.7–10.5)
CHLORIDE SERPL-SCNC: 98 MMOL/L (ref 95–110)
CO2 SERPL-SCNC: 33 MMOL/L (ref 23–29)
CREAT SERPL-MCNC: 1.2 MG/DL (ref 0.5–1.4)
DIFFERENTIAL METHOD: ABNORMAL
EOSINOPHIL # BLD AUTO: 0.2 K/UL (ref 0–0.5)
EOSINOPHIL NFR BLD: 3.1 % (ref 0–8)
ERYTHROCYTE [DISTWIDTH] IN BLOOD BY AUTOMATED COUNT: 13.6 % (ref 11.5–14.5)
EST. GFR  (NO RACE VARIABLE): >60 ML/MIN/1.73 M^2
GLUCOSE SERPL-MCNC: 105 MG/DL (ref 70–110)
HCT VFR BLD AUTO: 39.7 % (ref 40–54)
HGB BLD-MCNC: 13 G/DL (ref 14–18)
IMM GRANULOCYTES # BLD AUTO: 0.01 K/UL (ref 0–0.04)
IMM GRANULOCYTES NFR BLD AUTO: 0.2 % (ref 0–0.5)
INR PPP: 1.3 (ref 0.8–1.2)
LYMPHOCYTES # BLD AUTO: 1.5 K/UL (ref 1–4.8)
LYMPHOCYTES NFR BLD: 26.2 % (ref 18–48)
MCH RBC QN AUTO: 30.9 PG (ref 27–31)
MCHC RBC AUTO-ENTMCNC: 32.7 G/DL (ref 32–36)
MCV RBC AUTO: 94 FL (ref 82–98)
MONOCYTES # BLD AUTO: 0.8 K/UL (ref 0.3–1)
MONOCYTES NFR BLD: 14.5 % (ref 4–15)
NEUTROPHILS # BLD AUTO: 3.2 K/UL (ref 1.8–7.7)
NEUTROPHILS NFR BLD: 55.1 % (ref 38–73)
NRBC BLD-RTO: 0 /100 WBC
PLATELET # BLD AUTO: ABNORMAL K/UL (ref 150–450)
PLATELET BLD QL SMEAR: ABNORMAL
PMV BLD AUTO: ABNORMAL FL (ref 9.2–12.9)
POTASSIUM SERPL-SCNC: 3.5 MMOL/L (ref 3.5–5.1)
PROT SERPL-MCNC: 7.4 G/DL (ref 6–8.4)
PROTHROMBIN TIME: 13.1 SEC (ref 9–12.5)
RBC # BLD AUTO: 4.21 M/UL (ref 4.6–6.2)
SODIUM SERPL-SCNC: 138 MMOL/L (ref 136–145)
WBC # BLD AUTO: 5.73 K/UL (ref 3.9–12.7)

## 2023-01-17 PROCEDURE — 3288F PR FALLS RISK ASSESSMENT DOCUMENTED: ICD-10-PCS | Mod: CPTII,S$GLB,TXP, | Performed by: INTERNAL MEDICINE

## 2023-01-17 PROCEDURE — 3008F PR BODY MASS INDEX (BMI) DOCUMENTED: ICD-10-PCS | Mod: CPTII,S$GLB,TXP, | Performed by: INTERNAL MEDICINE

## 2023-01-17 PROCEDURE — 99214 OFFICE O/P EST MOD 30 MIN: CPT | Mod: S$GLB,TXP,, | Performed by: INTERNAL MEDICINE

## 2023-01-17 PROCEDURE — 3008F BODY MASS INDEX DOCD: CPT | Mod: CPTII,S$GLB,TXP, | Performed by: INTERNAL MEDICINE

## 2023-01-17 PROCEDURE — 3078F PR MOST RECENT DIASTOLIC BLOOD PRESSURE < 80 MM HG: ICD-10-PCS | Mod: CPTII,S$GLB,TXP, | Performed by: INTERNAL MEDICINE

## 2023-01-17 PROCEDURE — 1101F PT FALLS ASSESS-DOCD LE1/YR: CPT | Mod: CPTII,S$GLB,TXP, | Performed by: INTERNAL MEDICINE

## 2023-01-17 PROCEDURE — 1126F AMNT PAIN NOTED NONE PRSNT: CPT | Mod: CPTII,S$GLB,TXP, | Performed by: INTERNAL MEDICINE

## 2023-01-17 PROCEDURE — 99999 PR PBB SHADOW E&M-EST. PATIENT-LVL V: CPT | Mod: PBBFAC,TXP,, | Performed by: INTERNAL MEDICINE

## 2023-01-17 PROCEDURE — 36415 COLL VENOUS BLD VENIPUNCTURE: CPT | Mod: TXP | Performed by: INTERNAL MEDICINE

## 2023-01-17 PROCEDURE — 3288F FALL RISK ASSESSMENT DOCD: CPT | Mod: CPTII,S$GLB,TXP, | Performed by: INTERNAL MEDICINE

## 2023-01-17 PROCEDURE — 1159F PR MEDICATION LIST DOCUMENTED IN MEDICAL RECORD: ICD-10-PCS | Mod: CPTII,S$GLB,TXP, | Performed by: INTERNAL MEDICINE

## 2023-01-17 PROCEDURE — 1159F MED LIST DOCD IN RCRD: CPT | Mod: CPTII,S$GLB,TXP, | Performed by: INTERNAL MEDICINE

## 2023-01-17 PROCEDURE — 85610 PROTHROMBIN TIME: CPT | Mod: TXP | Performed by: INTERNAL MEDICINE

## 2023-01-17 PROCEDURE — 85025 COMPLETE CBC W/AUTO DIFF WBC: CPT | Mod: TXP | Performed by: INTERNAL MEDICINE

## 2023-01-17 PROCEDURE — 80053 COMPREHEN METABOLIC PANEL: CPT | Mod: TXP | Performed by: INTERNAL MEDICINE

## 2023-01-17 PROCEDURE — 3075F SYST BP GE 130 - 139MM HG: CPT | Mod: CPTII,S$GLB,TXP, | Performed by: INTERNAL MEDICINE

## 2023-01-17 PROCEDURE — 99999 PR PBB SHADOW E&M-EST. PATIENT-LVL V: ICD-10-PCS | Mod: PBBFAC,TXP,, | Performed by: INTERNAL MEDICINE

## 2023-01-17 PROCEDURE — 1126F PR PAIN SEVERITY QUANTIFIED, NO PAIN PRESENT: ICD-10-PCS | Mod: CPTII,S$GLB,TXP, | Performed by: INTERNAL MEDICINE

## 2023-01-17 PROCEDURE — 99214 PR OFFICE/OUTPT VISIT, EST, LEVL IV, 30-39 MIN: ICD-10-PCS | Mod: S$GLB,TXP,, | Performed by: INTERNAL MEDICINE

## 2023-01-17 PROCEDURE — 80321 ALCOHOLS BIOMARKERS 1OR 2: CPT | Mod: TXP | Performed by: INTERNAL MEDICINE

## 2023-01-17 PROCEDURE — 3078F DIAST BP <80 MM HG: CPT | Mod: CPTII,S$GLB,TXP, | Performed by: INTERNAL MEDICINE

## 2023-01-17 PROCEDURE — 3075F PR MOST RECENT SYSTOLIC BLOOD PRESS GE 130-139MM HG: ICD-10-PCS | Mod: CPTII,S$GLB,TXP, | Performed by: INTERNAL MEDICINE

## 2023-01-17 PROCEDURE — 1101F PR PT FALLS ASSESS DOC 0-1 FALLS W/OUT INJ PAST YR: ICD-10-PCS | Mod: CPTII,S$GLB,TXP, | Performed by: INTERNAL MEDICINE

## 2023-01-17 NOTE — PROGRESS NOTES
ADVANCED HEART FAILURE AND TRANSPLANTATION CLINIC VISIT    CHIEF COMPLAINT:  Follow-up    HISTORY OF PRESENT ILLNESS:  Juan Carlos Yoo Sr. is a 67 y.o.  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 Shaw for cardiac evaluation in setting of liver transplant workup 11/29/2022 and noted to have RVSP 53 mmHg by echo. This was thought to be possible HFpEF given AF with severe LAE with SIM 54 more than portopulmonary hypertension. He also had endorsed exertional chest pain and was referred to interventional cardiology for combined R and LHC. He was referred to EP in regards to AF    Since last visit, he had not undergone R/LHC but did have repeat echo 12/23 with improvement of PASP 23 mmHg in setting of normal IVC (last echo with PASP 53 in setting of estimated CVP 8 mmHg). Notes SOB with minimal activities. Has issues with knee pain and leg pain limiting prolonged ambulation. Notes occasional bendopnea, LE edema. Denies orthopnea, PND, bendopnea, abdominal distension, early satiety, nausea, lower extremity edema, chest discomfort, presyncope, palpitations, or syncope. Denies adverse bleeding, no hematochezia/melena/hematuria/hematemesis.    Current diuretic regimen: Lasix 40 mg PM + spironolactone 50 mg daily    Cardiac history:  He says the 1st time he saw a cardiologist was in June of this year.  He says that he was referred because they identified atrial fibrillation on an EKG that was done prior to him getting a colonoscopy.  This was the 1st time that he had heard of atrial fibrillation, but when he saw his cardiologist in June he mentioned that in retrospect there were several episodes (approx 5 over several years) where he presented to a local emergency room with palpitations and he was given IV medications which terminated the palpitations.  He does not  recall being told that these episodes were atrial fibrillation per se.     His cardiologist had ordered some additional testing which included an echocardiogram as well as Holter monitor.  Echocardiogram demonstrated the findings which were noted below, and Holter monitor demonstrated persistent atrial fibrillation.  He was supposed to follow up with the cardiologist, but he was unable to make the appointment as he was in [place] at that time.  Subsequent to this he was seen by transplant hepatology and ultimately referred here.     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  Does note occasional chest discomfort with exertion which is short-lived, as well as occasional shortness of breath with exertion.  These episodes are in intermittent and unpredictable when they come on.  He denies any palpitations, presyncope, or syncope.  He does note chronic leg swelling for which he is on Lasix.  He denies any PND or orthopnea.     He says that no one has spoken to him about anticoagulation for his atrial fibrillation in the past.  When asked specifically about bleeding, he mentions that he has occasional bleeding from hemorrhoids, as well as easy skin bruising, but has never been admitted with severe bleeding.  He does have chronic thrombocytopenia as a consequence of his liver disease and a chronically elevated INR which tends to be about 1.5-1.7.     He endorses chronic snoring. No noted apneic episodes, never been tested for LOLIS.  Angina: ?  Myocardial infarction: neg  Revascularization: neg  CVA/TIA: neg  VTE: neg  AF/arrhythmias: +  Obesity: BMI 37.3  Hyperlipidemia: +  DM: neg  PAD: ?    Cardiac Data:  Transthoracic Echo: Results for orders placed during the hospital encounter of 12/23/22  · The left ventricle is normal in size with low normal systolic function.  · The estimated ejection fraction is 50%.  · Indeterminate left ventricular diastolic function.  ·  Mild mitral regurgitation.  · Normal right ventricular size with mildly reduced right ventricular systolic function.  · There is right ventricular hypertrophy.  · Moderate to severe left atrial enlargement.  · The quantitatively derived ejection fraction is 48%.  · Moderate right atrial enlargement.  · Mild tricuspid regurgitation.  · Mild aortic regurgitation.  · Normal central venous pressure (3 mmHg).  · The estimated PA systolic pressure is 28 mmHg.    ECG:  bpm, QRS 92 ms    Left Heart Catheterization: none    Right Heart Catheterization: No results found for this or any previous visit.    Devices: none    PAST MEDICAL HISTORY:  Past Medical History:   Diagnosis Date    Bulging disc     Cirrhosis     Colon polyp 12/29/2017    Rpt 5 yrs    EV (esophageal varices)     Skin cancer 03/2017    Thrombocytopenia        PAST SURGICAL HISTORY:  Past Surgical History:   Procedure Laterality Date    CHOLECYSTECTOMY      COLONOSCOPY      COLONOSCOPY N/A 12/29/2017    ta rpt 2022    HERNIA REPAIR      SKIN BIOPSY  03/2017    TONSILLECTOMY      UPPER GASTROINTESTINAL ENDOSCOPY  2011    EV    UPPER GASTROINTESTINAL ENDOSCOPY  2016    VASECTOMY         SOCIAL HISTORY  Social History     Socioeconomic History    Marital status: Single   Occupational History     Employer: [employer]   Tobacco Use    Smoking status: Never    Smokeless tobacco: Never   Substance and Sexual Activity    Alcohol use: Not Currently     Alcohol/week: 0.0 standard drinks    Drug use: No       FAMILY HISTORY  Family History   Problem Relation Age of Onset    Hyperlipidemia Brother     Cancer Maternal Uncle         Leukemia    Heart disease Maternal Uncle     Ovarian cancer Sister     Colon cancer Neg Hx        ALLERGIES:  Review of patient's allergies indicates:  No Known Allergies    MEDICATIONS  Current Outpatient Medications on File Prior to Visit   Medication Sig Dispense Refill    amLODIPine (NORVASC) 10 MG tablet       apixaban  "(ELIQUIS) 5 mg Tab Take 1 tablet (5 mg total) by mouth 2 (two) times daily. 60 tablet 11    ascorbic acid, vitamin C, (VITAMIN C) 500 MG tablet Take 500 mg by mouth.      cyanocobalamin (VITAMIN B-12) 100 MCG tablet Take 100 mcg by mouth once daily.      fish oil-omega-3 fatty acids 300-1,000 mg capsule Take 1 g by mouth 2 (two) times daily.      furosemide (LASIX) 40 MG tablet Take 1 tablet (40 mg total) by mouth 2 (two) times a day. 30 tablet 6    nadoloL (CORGARD) 20 MG tablet Take 2 tablets (40 mg total) by mouth every evening. 90 tablet 2    pantoprazole (PROTONIX) 40 MG tablet Take 1 tablet (40 mg total) by mouth once daily. 90 tablet 3    spironolactone (ALDACTONE) 50 MG tablet Take 1 tablet (50 mg total) by mouth once daily. 90 tablet 3    tamsulosin (FLOMAX) 0.4 mg Cap Take 1 capsule (0.4 mg total) by mouth every evening. 30 capsule 6     No current facility-administered medications on file prior to visit.       REVIEW OF SYSTEMS  Review of Systems    PHYSICAL EXAM:    Vitals:    01/17/23 1355 01/17/23 1400   BP: (!) 141/61 133/61   BP Location: Right arm Left arm   Patient Position: Sitting Standing   BP Method: Large (Automatic) Large (Automatic)   Pulse: 89 74   Weight: 128.2 kg (282 lb 10.1 oz)    Height: 6' 1" (1.854 m)     Body mass index is 37.29 kg/m².    GENERAL: Alert, NAD   HEENT: Anicteric sclerae  NECK: JVP not visible above level of clavicle while sitting upright  CARDIOVASCULAR: Irregularly irregular rhythm. Normal S1, S2 no murmurs, rubs or gallops.  PULMONARY: Lungs clear to auscultation bilaterally  ABDOMEN: Soft, nontender, nondistended. No guarding, no rebound, no hepatomegaly  EXTREMITIES: Warm. No clubbing, cyanosis or edema. No pre-sacral edema  VASCULAR: 2+ bilateral radial pulses  NEUROLOGIC: No focal deficits    LABS:    Lab Results   Component Value Date     01/10/2023    K 3.4 (L) 01/10/2023     01/10/2023    CO2 32 (H) 01/10/2023    BUN 12 01/10/2023    CREATININE " 0.9 01/10/2023    CALCIUM 9.2 01/10/2023    ANIONGAP 8 01/10/2023    EGFRNORACEVR >60.0 01/10/2023       Magnesium   Date Value Ref Range Status   11/29/2022 1.4 (L) 1.6 - 2.6 mg/dL Final       Lab Results   Component Value Date    WBC 4.86 12/13/2022    HGB 13.8 (L) 12/13/2022    HCT 40.9 12/13/2022    MCV 95 12/13/2022     (L) 12/13/2022       Lab Results   Component Value Date    INR 1.3 (H) 12/13/2022    INR 1.4 (H) 10/17/2022    INR 1.4 (H) 10/17/2022       BNP   Date Value Ref Range Status   11/29/2022 237 (H) 0 - 99 pg/mL Final     Comment:     Values of less than 100 pg/ml are consistent with non-CHF populations.   06/15/2022 311 (H) 0 - 99 pg/mL Final     Comment:     Values of less than 100 pg/ml are consistent with non-CHF populations.       No results found for: LDH    IMPRESSION:    1. Organ transplant candidate    2. Pulmonary hypertension    3. Shortness of breath    4. PVC's (premature ventricular contractions)    5. Longstanding persistent atrial fibrillation    6. Coagulopathy    7. Alcoholic cirrhosis of liver with ascites        PLAN:    Higher pre-test probability for CAD, would agree to pursue invasive angiography and at same time can have RHC formally done as well to assess PA pressures, PVR, CO/CI, RA pressure and PCWP despite improvement in PA pressures on echo. Agree this may be more so related to PH-LHD. H2FPEF score with >98% probability of HF preserved EF. Would continue current Lasix and spironolactone doses pending RHC.     Recommend 2 gram sodium restriction and 1500 mL fluid restriction.  Encourage physical activity with graded exercise program.  Requested patient to weigh themselves daily, and to notify us if their weight increases by more than 3 lbs in 1 day or 5 lbs in 1 week.     Pt to call us with more shortness of breath, swelling or unexpected weight changes, fever, chills, bloody or black bowel movements, or other concerns.    F/U after heart cath with Dr. Shaw.      Electronically signed by:   Felicia Olivarez   01/17/2023 1:08 PM

## 2023-01-17 NOTE — LETTER
January 17, 2023        Aruna Miranda  [address]  [place] [address]  Phone: [phone]  Fax: [phone]             [id number]  [address]  [place] [address]  Phone: [phone]   Patient: Juan Carlos Yoo Sr.   MR Number: [id number]   YOB: 1955   Date of Visit: 1/17/2023       Dear Dr. Aruna Miranda    Thank you for referring Juan Carlos Yoo to me for evaluation. Attached you will find relevant portions of my assessment and plan of care.    If you have questions, please do not hesitate to call me. I look forward to following Juan Carlos Yoo along with you.    Sincerely,    Felicia Olivarez, DO    Enclosure    If you would like to receive this communication electronically, please contact [email or web address] or [phone] to request [employer] Link access.    [employer] Link is a tool which provides read-only access to select patient information with whom you have a relationship. Its easy to use and provides real time access to review your patients record including encounter summaries, notes, results, and demographic information.    If you feel you have received this communication in error or would no longer like to receive these types of communications, please e-mail [email or web address]

## 2023-01-20 ENCOUNTER — TELEPHONE (OUTPATIENT)
Dept: SLEEP MEDICINE | Facility: OTHER | Age: 68
End: 2023-01-20
Payer: COMMERCIAL

## 2023-01-20 LAB
CLINICAL BIOCHEMIST REVIEW: NORMAL
PLPETH BLD-MCNC: 49 NG/ML
POPETH BLD-MCNC: 49 NG/ML

## 2023-01-30 ENCOUNTER — LAB VISIT (OUTPATIENT)
Dept: LAB | Facility: HOSPITAL | Age: 68
End: 2023-01-30
Payer: COMMERCIAL

## 2023-01-30 ENCOUNTER — EDUCATION (OUTPATIENT)
Dept: CARDIOLOGY | Facility: CLINIC | Age: 68
End: 2023-01-30
Payer: COMMERCIAL

## 2023-01-30 ENCOUNTER — OFFICE VISIT (OUTPATIENT)
Dept: CARDIOLOGY | Facility: CLINIC | Age: 68
End: 2023-01-30
Payer: COMMERCIAL

## 2023-01-30 VITALS
WEIGHT: 283.75 LBS | HEART RATE: 87 BPM | DIASTOLIC BLOOD PRESSURE: 60 MMHG | SYSTOLIC BLOOD PRESSURE: 132 MMHG | OXYGEN SATURATION: 95 % | HEIGHT: 73 IN | BODY MASS INDEX: 37.61 KG/M2

## 2023-01-30 DIAGNOSIS — I48.11 LONGSTANDING PERSISTENT ATRIAL FIBRILLATION: ICD-10-CM

## 2023-01-30 DIAGNOSIS — Z76.82 ORGAN TRANSPLANT CANDIDATE: ICD-10-CM

## 2023-01-30 DIAGNOSIS — K70.30 ALCOHOLIC CIRRHOSIS, UNSPECIFIED WHETHER ASCITES PRESENT: ICD-10-CM

## 2023-01-30 DIAGNOSIS — I70.0 AORTIC ATHEROSCLEROSIS: ICD-10-CM

## 2023-01-30 DIAGNOSIS — K70.30 ALCOHOLIC CIRRHOSIS OF LIVER WITHOUT ASCITES: ICD-10-CM

## 2023-01-30 DIAGNOSIS — D69.6 THROMBOCYTOPENIA: ICD-10-CM

## 2023-01-30 DIAGNOSIS — I27.20 PULMONARY HYPERTENSION: ICD-10-CM

## 2023-01-30 DIAGNOSIS — R06.02 SHORTNESS OF BREATH: ICD-10-CM

## 2023-01-30 DIAGNOSIS — I48.91 ATRIAL FIBRILLATION, UNSPECIFIED TYPE: Primary | ICD-10-CM

## 2023-01-30 DIAGNOSIS — D69.6 THROMBOCYTOPENIA: Primary | ICD-10-CM

## 2023-01-30 LAB
BASOPHILS # BLD AUTO: 0.04 K/UL (ref 0–0.2)
BASOPHILS NFR BLD: 0.8 % (ref 0–1.9)
DIFFERENTIAL METHOD: ABNORMAL
EOSINOPHIL # BLD AUTO: 0.2 K/UL (ref 0–0.5)
EOSINOPHIL NFR BLD: 3.6 % (ref 0–8)
ERYTHROCYTE [DISTWIDTH] IN BLOOD BY AUTOMATED COUNT: 13.4 % (ref 11.5–14.5)
HCT VFR BLD AUTO: 37.6 % (ref 40–54)
HGB BLD-MCNC: 12.4 G/DL (ref 14–18)
IMM GRANULOCYTES # BLD AUTO: 0.01 K/UL (ref 0–0.04)
IMM GRANULOCYTES NFR BLD AUTO: 0.2 % (ref 0–0.5)
LYMPHOCYTES # BLD AUTO: 1.5 K/UL (ref 1–4.8)
LYMPHOCYTES NFR BLD: 30.4 % (ref 18–48)
MCH RBC QN AUTO: 31.2 PG (ref 27–31)
MCHC RBC AUTO-ENTMCNC: 33 G/DL (ref 32–36)
MCV RBC AUTO: 95 FL (ref 82–98)
MONOCYTES # BLD AUTO: 0.7 K/UL (ref 0.3–1)
MONOCYTES NFR BLD: 13.7 % (ref 4–15)
NEUTROPHILS # BLD AUTO: 2.6 K/UL (ref 1.8–7.7)
NEUTROPHILS NFR BLD: 51.3 % (ref 38–73)
NRBC BLD-RTO: 0 /100 WBC
PLATELET # BLD AUTO: 121 K/UL (ref 150–450)
PMV BLD AUTO: 11.5 FL (ref 9.2–12.9)
RBC # BLD AUTO: 3.98 M/UL (ref 4.6–6.2)
WBC # BLD AUTO: 5.03 K/UL (ref 3.9–12.7)

## 2023-01-30 PROCEDURE — 3078F PR MOST RECENT DIASTOLIC BLOOD PRESSURE < 80 MM HG: ICD-10-PCS | Mod: CPTII,S$GLB,TXP, | Performed by: INTERNAL MEDICINE

## 2023-01-30 PROCEDURE — 36415 COLL VENOUS BLD VENIPUNCTURE: CPT | Mod: TXP | Performed by: PHYSICIAN ASSISTANT

## 2023-01-30 PROCEDURE — 85025 COMPLETE CBC W/AUTO DIFF WBC: CPT | Mod: NTX | Performed by: PHYSICIAN ASSISTANT

## 2023-01-30 PROCEDURE — 1125F PR PAIN SEVERITY QUANTIFIED, PAIN PRESENT: ICD-10-PCS | Mod: CPTII,S$GLB,TXP, | Performed by: INTERNAL MEDICINE

## 2023-01-30 PROCEDURE — 1159F MED LIST DOCD IN RCRD: CPT | Mod: CPTII,S$GLB,TXP, | Performed by: INTERNAL MEDICINE

## 2023-01-30 PROCEDURE — 1159F PR MEDICATION LIST DOCUMENTED IN MEDICAL RECORD: ICD-10-PCS | Mod: CPTII,S$GLB,TXP, | Performed by: INTERNAL MEDICINE

## 2023-01-30 PROCEDURE — 3008F PR BODY MASS INDEX (BMI) DOCUMENTED: ICD-10-PCS | Mod: CPTII,S$GLB,TXP, | Performed by: INTERNAL MEDICINE

## 2023-01-30 PROCEDURE — 3075F SYST BP GE 130 - 139MM HG: CPT | Mod: CPTII,S$GLB,TXP, | Performed by: INTERNAL MEDICINE

## 2023-01-30 PROCEDURE — 3078F DIAST BP <80 MM HG: CPT | Mod: CPTII,S$GLB,TXP, | Performed by: INTERNAL MEDICINE

## 2023-01-30 PROCEDURE — 3075F PR MOST RECENT SYSTOLIC BLOOD PRESS GE 130-139MM HG: ICD-10-PCS | Mod: CPTII,S$GLB,TXP, | Performed by: INTERNAL MEDICINE

## 2023-01-30 PROCEDURE — 99204 PR OFFICE/OUTPT VISIT, NEW, LEVL IV, 45-59 MIN: ICD-10-PCS | Mod: S$GLB,TXP,, | Performed by: INTERNAL MEDICINE

## 2023-01-30 PROCEDURE — 3008F BODY MASS INDEX DOCD: CPT | Mod: CPTII,S$GLB,TXP, | Performed by: INTERNAL MEDICINE

## 2023-01-30 PROCEDURE — 99204 OFFICE O/P NEW MOD 45 MIN: CPT | Mod: S$GLB,TXP,, | Performed by: INTERNAL MEDICINE

## 2023-01-30 PROCEDURE — 1125F AMNT PAIN NOTED PAIN PRSNT: CPT | Mod: CPTII,S$GLB,TXP, | Performed by: INTERNAL MEDICINE

## 2023-01-30 PROCEDURE — 99999 PR PBB SHADOW E&M-EST. PATIENT-LVL V: CPT | Mod: PBBFAC,TXP,, | Performed by: INTERNAL MEDICINE

## 2023-01-30 PROCEDURE — 99999 PR PBB SHADOW E&M-EST. PATIENT-LVL V: ICD-10-PCS | Mod: PBBFAC,TXP,, | Performed by: INTERNAL MEDICINE

## 2023-01-30 RX ORDER — SODIUM CHLORIDE 0.9 % (FLUSH) 0.9 %
10 SYRINGE (ML) INJECTION
Status: SHIPPED | OUTPATIENT
Start: 2023-01-30

## 2023-01-30 RX ORDER — ASPIRIN 81 MG/1
81 TABLET ORAL DAILY
Qty: 30 TABLET | Refills: 11 | Status: ON HOLD | OUTPATIENT
Start: 2023-01-30 | End: 2023-02-08 | Stop reason: HOSPADM

## 2023-01-30 RX ORDER — SODIUM CHLORIDE 9 MG/ML
INJECTION, SOLUTION INTRAVENOUS CONTINUOUS
Status: CANCELLED | OUTPATIENT
Start: 2023-01-30 | End: 2023-01-30

## 2023-01-30 RX ORDER — DIPHENHYDRAMINE HCL 50 MG
50 CAPSULE ORAL ONCE
Status: CANCELLED | OUTPATIENT
Start: 2023-01-30 | End: 2023-01-30

## 2023-01-30 RX ORDER — CLOPIDOGREL BISULFATE 75 MG/1
TABLET ORAL
Qty: 30 TABLET | Refills: 11 | Status: ON HOLD | OUTPATIENT
Start: 2023-01-30 | End: 2023-02-08 | Stop reason: HOSPADM

## 2023-01-30 NOTE — PROGRESS NOTES
OUTPATIENT CATHETERIZATION INSTRUCTIONS    You have been scheduled for a procedure in the catheterization lab on Wednesday, February 8, 2023.     Please report to the Cardiology Waiting Area on the Third floor of the hospital and check in at 7 AM.   You will then be taken to the SSCU (Short Stay Cardiac Unit) and prepared for your procedure. Please be aware that this is not the time of your procedure but the time you are to arrive. The procedures are scheduled on an hourly basis; however, emergency cases take precedence over all other cases.       No solid foods 8 hours prior to your procedure.  You may have clear liquids until the time of your admission which should be 2 hours prior to your procedure.  You are encouraged to drink at least 8 ounces of clear liquids prior to your admission to SSCU.  Patients with gastric emptying issues should be fasting for 6- 8 hours prior to the procedure.  Clear liquids include water, black coffee, clear juices, and performance drinks - no pulp or milk.    Heart failure or dialysis patients will be limited to 8 ounces (1 cup) of clear liquids up until 2 hours of the procedure.    3.   You may take your regular morning medications with water. If there are any medications that you should not take you will be instructed to hold them that morning. If you         are diabetic and on Metformin (Glucophage) do not take it the day before, the day of, and for 2 days after your procedure.  4.   If you are on Pradaxa, Eliquis or Coumadin , you can hold them 3 days prior to your procedure.      The procedure will take 1-2 hours to perform. After the procedure, you will return to SSCU on the third floor of the hospital. You will need to lie still (or keep your arm still) for the next 2 to 4 hours to minimize bleeding from the puncture site.  This time is determined by your physician.  Your family may remain in the room with you during this time.       You may be able to be discharged home  "that same afternoon if there is someone to drive you home and there are no complications.  Your doctor will determine, based on your progress, the date and time of your discharge. The results of your procedure will be discussed with you before you are discharged. Any further testing or procedures will be scheduled for you either before you leave or after your discharge..     Special Instructions:  Start taking 81mg Aspirin daily  Plavix 75m tablets night before and 1 tablet morning of procedure    If you should have any questions, concerns, or need to change the date of your procedure, please call JAZMYNE Frausto @ [phone]",                THE ABOVE INSTRUCTIONS WERE GIVEN TO THE PATIENT VERBALLY AND THEY VERBALIZED UNDERSTANDING.  THEY DO NOT REQUIRE ANY SPECIAL NEEDS AND DO NOT HAVE ANY LEARNING BARRIERS.          Directions for Reporting to Cardiology Waiting Area in the Hospital  If you park in the Parking Garage:  Take elevators to the1st floor of the parking garage.  Continue past the gift shop, coffee shop, and piano.  Take a right and go to the gold elevators. (Elevator B)  Take the elevator to the 3rd floor.  Follow the arrow on the sign on the wall that says Cath Lab Registration/EP Lab Registration.  Follow the long hallway all the way around until you come to a big open area.  This is the registration area.  Check in at Reception Desk.    OR    If family is dropping you off:  Have them drop you off at the front of the Hospital under the green overhang.  Enter through the doors and take a right.  Take the E elevators to the 3rd floor Cardiology Waiting Area.  Check in at the Reception Desk in the waiting room.       "

## 2023-01-30 NOTE — H&P (VIEW-ONLY)
Subjective:     Referring Physician: Dr. Cota (liver transplant)    JAVON Yoo Sr. is a 67 y.o. 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    Patient was seen by advanced heart failure clinic. TTE showed an increased PASP of 53 mmHg on 6/15/22 with presented EF and atrial fibrillation. Repeat TTE on 12/23 showed improved PASP of 28 mmHg.     Patient states that he was scheduled for a colonoscopy as part of transplant workup. Colonoscopy was canceled when telemetry showed that he was in new onset Afib. Regarding his symptoms, he reports SOB with mild exertion. MACK has been present for about 2 year, but has worsened recently. He denies chest pain. He has never had an angiogram in the past.     NYHA:II CCS:0    Review of Systems   Constitutional: Negative for chills and fever.   HENT:  Negative for sore throat.    Eyes:  Negative for blurred vision.   Cardiovascular:  Positive for dyspnea on exertion. Negative for chest pain, claudication, cyanosis, irregular heartbeat, leg swelling, near-syncope, orthopnea, palpitations, paroxysmal nocturnal dyspnea and syncope.   Respiratory:  Negative for cough and sputum production.    Hematologic/Lymphatic: Does not bruise/bleed easily.   Skin:  Negative for itching, rash and suspicious lesions.   Musculoskeletal:  Negative for falls.   Gastrointestinal:  Negative for abdominal pain and change in bowel habit.   Genitourinary:  Negative for dysuria.   Neurological:  Negative for disturbances in coordination, dizziness and loss of balance.   Psychiatric/Behavioral:  Negative for altered mental status.         Past Medical History:   Diagnosis Date    Bulging disc     Cirrhosis     Colon polyp 12/29/2017    Rpt 5 yrs    EV (esophageal varices)     Skin cancer 03/2017    Thrombocytopenia        Current Outpatient Medications:     amLODIPine (NORVASC) 10  MG tablet, , Disp: , Rfl:     apixaban (ELIQUIS) 5 mg Tab, Take 1 tablet (5 mg total) by mouth 2 (two) times daily., Disp: 60 tablet, Rfl: 11    ascorbic acid, vitamin C, (VITAMIN C) 500 MG tablet, Take 500 mg by mouth., Disp: , Rfl:     cyanocobalamin (VITAMIN B-12) 100 MCG tablet, Take 100 mcg by mouth once daily., Disp: , Rfl:     fish oil-omega-3 fatty acids 300-1,000 mg capsule, Take 1 g by mouth 2 (two) times daily., Disp: , Rfl:     furosemide (LASIX) 40 MG tablet, Take 1 tablet (40 mg total) by mouth 2 (two) times a day., Disp: 30 tablet, Rfl: 6    nadoloL (CORGARD) 20 MG tablet, Take 2 tablets (40 mg total) by mouth every evening., Disp: 90 tablet, Rfl: 2    pantoprazole (PROTONIX) 40 MG tablet, Take 1 tablet (40 mg total) by mouth once daily., Disp: 90 tablet, Rfl: 3    spironolactone (ALDACTONE) 50 MG tablet, Take 1 tablet (50 mg total) by mouth once daily., Disp: 90 tablet, Rfl: 3    tamsulosin (FLOMAX) 0.4 mg Cap, Take 1 capsule (0.4 mg total) by mouth every evening., Disp: 30 capsule, Rfl: 6    aspirin (ECOTRIN) 81 MG EC tablet, Take 1 tablet (81 mg total) by mouth once daily., Disp: 30 tablet, Rfl: 11    clopidogreL (PLAVIX) 75 mg tablet, Take 4 tabs (300mg) night before procedure then 1 tab (75mg) daily starting day of procedure., Disp: 30 tablet, Rfl: 11    Current Facility-Administered Medications:     sodium chloride 0.9% flush 10 mL, 10 mL, Intravenous, PRN, Flo Malone MD    Objective:    Physical Exam  Vitals reviewed.   Constitutional:       General: He is not in acute distress.     Appearance: He is well-developed. He is not diaphoretic.   HENT:      Head: Normocephalic and atraumatic.   Neck:      Vascular: No JVD.   Cardiovascular:      Rate and Rhythm: Normal rate. Rhythm irregular.      Pulses: Intact distal pulses.      Heart sounds: No murmur heard.  Pulmonary:      Effort: Pulmonary effort is normal. No respiratory distress.   Musculoskeletal:      Cervical back: Normal range of  "motion.      Right lower leg: No edema.      Left lower leg: No edema.   Skin:     General: Skin is warm and dry.   Neurological:      Mental Status: He is alert and oriented to person, place, and time.           Vitals:    01/30/23 0948 01/30/23 0950   BP: (!) 140/64 132/60   BP Location: Right arm Left arm   Patient Position: Sitting Sitting   BP Method: Large (Automatic) Large (Automatic)   Pulse: 87 87   SpO2: 95%    Weight: 128.7 kg (283 lb 11.7 oz)    Height: 6' 1" (1.854 m)      Body mass index is 37.43 kg/m².    Test(s) Reviewed  I have reviewed the following in detail:  [] Stress test   [] Angiography   [] Echocardiogram   [] Labs   [] Other       Chemistry        Component Value Date/Time     01/17/2023 1512    K 3.5 01/17/2023 1512    CL 98 01/17/2023 1512    CO2 33 (H) 01/17/2023 1512    BUN 13 01/17/2023 1512    CREATININE 1.2 01/17/2023 1512     01/17/2023 1512        Component Value Date/Time    CALCIUM 9.0 01/17/2023 1512    ALKPHOS 54 (L) 01/17/2023 1512    AST 19 01/17/2023 1512    ALT 9 (L) 01/17/2023 1512    BILITOT 3.3 (H) 01/17/2023 1512    ESTGFRAFRICA >60.0 06/13/2022 1013    EGFRNONAA >60.0 06/13/2022 1013            TTE 12/23/22  The left ventricle is normal in size with low normal systolic function.  The estimated ejection fraction is 50%.  Indeterminate left ventricular diastolic function.  Mild mitral regurgitation.  Normal right ventricular size with mildly reduced right ventricular systolic function.  There is right ventricular hypertrophy.  Moderate to severe left atrial enlargement.  The quantitatively derived ejection fraction is 48%.  Moderate right atrial enlargement.  Mild tricuspid regurgitation.  Mild aortic regurgitation.  Normal central venous pressure (3 mmHg).  The estimated PA systolic pressure is 28 mmHg.    Assessment:   Alcoholic cirrhosis  Being evaluated by the Transplant team. Needs RHC/LHC as part of his evaluation.     Longstanding persistent atrial " fibrillation  Diagnosed 1/2022 while admitted for encephalopathy. On Eliquis.     Pulmonary hypertension  History of PASP >50 mmHg. Plan for RHC at the time of LHC.     Aortic atherosclerosis  See imaging. Follow up with Cardiology.     Plan:     -Will proceed with LHC/RHC.   - Anti-platelet Therapy: ASA/Plavix  - Access: right femoral  - Creatinine/CrCl: 1.2  - Allergies: none  - All patient's questions were answered.  -The risks, benefits and alternatives of the procedure were explained to the patient.   -The risks of coronary angiography include but are not limited to: bleeding, infection, heart rhythm abnormalities, allergic reactions, kidney injury and potential need for dialysis, stroke and death.   - Should stenting be indicated, the patient has agreed to dual anti-platelet therapy for 1-consecutive year with a drug-eluting stent and a minimum of 1-month with the use of a bare metal stent  - Additionally, pt is aware that non-compliance is likely to result in stent clotting with heart attack, heart failure, and/or death  -The risks of moderate sedation include hypotension, respiratory depression, arrhythmias, bronchospasm, and death.   - Informed consent was obtained and the  patient is agreeable to proceed with the procedure.    No orders of the defined types were placed in this encounter.    Medications Ordered This Encounter   Medications    aspirin (ECOTRIN) 81 MG EC tablet    clopidogreL (PLAVIX) 75 mg tablet       Veronica Michaels PA-C  Valve and Structural Heart Disease  [facility]    Staff:  I have personally taken the history and examined this patient and agree with the fellow's note as stated above and amended it accordingly :-)  He needs coronary angiography and possible intervention and a requested RHC for history of pulmonary HTN as part of his liver transplantation workup.  He will need OAC for chronic AF and DAP for possible PCI.  Will recheck his platelet count today. Will  hold his OAC for cath.

## 2023-02-02 ENCOUNTER — TELEPHONE (OUTPATIENT)
Dept: TRANSPLANT | Facility: CLINIC | Age: 68
End: 2023-02-02
Payer: COMMERCIAL

## 2023-02-02 ENCOUNTER — TELEPHONE (OUTPATIENT)
Dept: SLEEP MEDICINE | Facility: OTHER | Age: 68
End: 2023-02-02
Payer: COMMERCIAL

## 2023-02-02 NOTE — TELEPHONE ENCOUNTER
Talked to patient a couple times about scheduling the home sleep study,no response.  Sent out a reminder through my chart to schedule.

## 2023-02-02 NOTE — TELEPHONE ENCOUNTER
PETH remains positive.  Dr. Cota would like patient to start IOP.  Patient states that he has many medical papers to look through but he will try to find IOP paperwork and if he does not find it, he will let me know.   Patient confessed to drinking around the holidays.   He has not started a 12 step program.  He states that he understands he has continued to drink alcohol despite knowing he has liver disease. He states he wants to stop drinking but it is hard to stop. Mr. Yoo states he will call a local AA group this week and start AA. I explained how important it is for him to go to AA and IOP to establish healthy coping mechanisms. Pt states he understands. He has f/u with SW and Dr. Cota at end of February. Coordinator recommends appointment with addiction psych and will put in a consult.

## 2023-02-02 NOTE — TELEPHONE ENCOUNTER
Please see previous social work notes for continuity.    SW spoke to patient over phone to discuss Substance Abuse Relapse Prevention Treatment plans.  Patient shared that he was not sure that he would benefit from any treatment if he was not a candidate for transplant.  SW encouraged patient to enroll in IOP and participate in AA meetings even if patient is not considered for a transplant as patient could benefit from education to maintain sobriety in order to live a healthier life.  Patient verbalized understanding and asked SW to mail resources to him as he does not have an e-mail address.  INDY has mailed IOP and AA resources to patients address.  INDY also asked patient to bring primary caregiver to next appointment as she was not at the previous appointment and patient agreed to do so.  SW will assist as needed.

## 2023-02-02 NOTE — TELEPHONE ENCOUNTER
CANDICE Soares RN, [facility] Staff  (December 2022)    Please inform patient;   MRI shoed 1.3 cm lesion. When it gets to 2 cm or higher size, will request treatment by interventional radiology. Please schedule another MRI w/wo contrast in 3 months.    __________________    MRI scheduled for 3/28/23.  Patient will be mailed an appt reminder.

## 2023-02-08 ENCOUNTER — HOSPITAL ENCOUNTER (OUTPATIENT)
Facility: HOSPITAL | Age: 68
Discharge: HOME | End: 2023-02-08
Attending: INTERNAL MEDICINE | Admitting: INTERNAL MEDICINE
Payer: COMMERCIAL

## 2023-02-08 VITALS
HEIGHT: 73 IN | TEMPERATURE: 98 F | OXYGEN SATURATION: 100 % | DIASTOLIC BLOOD PRESSURE: 74 MMHG | SYSTOLIC BLOOD PRESSURE: 124 MMHG | RESPIRATION RATE: 18 BRPM | BODY MASS INDEX: 37.24 KG/M2 | WEIGHT: 281 LBS | HEART RATE: 91 BPM

## 2023-02-08 DIAGNOSIS — K70.30 ALCOHOLIC CIRRHOSIS OF LIVER WITHOUT ASCITES: ICD-10-CM

## 2023-02-08 DIAGNOSIS — Z76.82 ORGAN TRANSPLANT CANDIDATE: Primary | ICD-10-CM

## 2023-02-08 DIAGNOSIS — I48.91 ATRIAL FIBRILLATION, UNSPECIFIED TYPE: ICD-10-CM

## 2023-02-08 LAB
ABO + RH BLD: NORMAL
ANION GAP SERPL CALC-SCNC: 11 MMOL/L (ref 8–16)
APTT BLDCRRT: 29.5 SEC (ref 21–32)
BLD GP AB SCN CELLS X3 SERPL QL: NORMAL
BUN SERPL-MCNC: 13 MG/DL (ref 8–23)
CALCIUM SERPL-MCNC: 9 MG/DL (ref 8.7–10.5)
CHLORIDE SERPL-SCNC: 98 MMOL/L (ref 95–110)
CO2 SERPL-SCNC: 27 MMOL/L (ref 23–29)
CREAT SERPL-MCNC: 1.2 MG/DL (ref 0.5–1.4)
ERYTHROCYTE [DISTWIDTH] IN BLOOD BY AUTOMATED COUNT: 14.3 % (ref 11.5–14.5)
EST. GFR  (NO RACE VARIABLE): >60 ML/MIN/1.73 M^2
GLUCOSE SERPL-MCNC: 100 MG/DL (ref 70–110)
HCT VFR BLD AUTO: 38.6 % (ref 40–54)
HGB BLD-MCNC: 13 G/DL (ref 14–18)
INR PPP: 1.3 (ref 0.8–1.2)
MCH RBC QN AUTO: 31.5 PG (ref 27–31)
MCHC RBC AUTO-ENTMCNC: 33.7 G/DL (ref 32–36)
MCV RBC AUTO: 94 FL (ref 82–98)
PLATELET # BLD AUTO: 132 K/UL (ref 150–450)
PLATELET RESPONSE PLAVIX: 62 PRU (ref 194–418)
PMV BLD AUTO: 11.7 FL (ref 9.2–12.9)
POTASSIUM SERPL-SCNC: 3.4 MMOL/L (ref 3.5–5.1)
PROTHROMBIN TIME: 13.2 SEC (ref 9–12.5)
RBC # BLD AUTO: 4.13 M/UL (ref 4.6–6.2)
SODIUM SERPL-SCNC: 136 MMOL/L (ref 136–145)
WBC # BLD AUTO: 6.1 K/UL (ref 3.9–12.7)

## 2023-02-08 PROCEDURE — 25000003 PHARM REV CODE 250: Mod: TXP | Performed by: INTERNAL MEDICINE

## 2023-02-08 PROCEDURE — 99153 MOD SED SAME PHYS/QHP EA: CPT | Mod: TXP | Performed by: INTERNAL MEDICINE

## 2023-02-08 PROCEDURE — 99152 MOD SED SAME PHYS/QHP 5/>YRS: CPT | Mod: TXP | Performed by: INTERNAL MEDICINE

## 2023-02-08 PROCEDURE — 93005 ELECTROCARDIOGRAM TRACING: CPT | Mod: 59,TXP

## 2023-02-08 PROCEDURE — 93456 R HRT CORONARY ARTERY ANGIO: CPT | Mod: TXP | Performed by: INTERNAL MEDICINE

## 2023-02-08 PROCEDURE — 85027 COMPLETE CBC AUTOMATED: CPT | Mod: TXP | Performed by: INTERNAL MEDICINE

## 2023-02-08 PROCEDURE — C1769 GUIDE WIRE: HCPCS | Mod: TXP | Performed by: INTERNAL MEDICINE

## 2023-02-08 PROCEDURE — 99152 MOD SED SAME PHYS/QHP 5/>YRS: CPT | Mod: TXP,,, | Performed by: INTERNAL MEDICINE

## 2023-02-08 PROCEDURE — 85730 THROMBOPLASTIN TIME PARTIAL: CPT | Mod: TXP | Performed by: INTERNAL MEDICINE

## 2023-02-08 PROCEDURE — 99152 PR MOD CONSCIOUS SEDATION, SAME PHYS, 5+ YRS, FIRST 15 MIN: ICD-10-PCS | Mod: TXP,,, | Performed by: INTERNAL MEDICINE

## 2023-02-08 PROCEDURE — 25500020 PHARM REV CODE 255: Mod: TXP | Performed by: INTERNAL MEDICINE

## 2023-02-08 PROCEDURE — 63600175 PHARM REV CODE 636 W HCPCS: Mod: TXP | Performed by: INTERNAL MEDICINE

## 2023-02-08 PROCEDURE — 93010 ELECTROCARDIOGRAM REPORT: CPT | Mod: TXP,,, | Performed by: INTERNAL MEDICINE

## 2023-02-08 PROCEDURE — 86900 BLOOD TYPING SEROLOGIC ABO: CPT | Mod: TXP | Performed by: INTERNAL MEDICINE

## 2023-02-08 PROCEDURE — 85576 BLOOD PLATELET AGGREGATION: CPT | Mod: TXP | Performed by: INTERNAL MEDICINE

## 2023-02-08 PROCEDURE — 80048 BASIC METABOLIC PNL TOTAL CA: CPT | Mod: TXP | Performed by: INTERNAL MEDICINE

## 2023-02-08 PROCEDURE — C1894 INTRO/SHEATH, NON-LASER: HCPCS | Mod: TXP | Performed by: INTERNAL MEDICINE

## 2023-02-08 PROCEDURE — 85610 PROTHROMBIN TIME: CPT | Mod: TXP | Performed by: INTERNAL MEDICINE

## 2023-02-08 PROCEDURE — 93456 R HRT CORONARY ARTERY ANGIO: CPT | Mod: 26,TXP,, | Performed by: INTERNAL MEDICINE

## 2023-02-08 PROCEDURE — 93456 PR CATH PLACE/CORONARY ANGIO, IMG SUPER/INTERP,W RIGHT HEART CATH: ICD-10-PCS | Mod: 26,TXP,, | Performed by: INTERNAL MEDICINE

## 2023-02-08 PROCEDURE — C1760 CLOSURE DEV, VASC: HCPCS | Mod: TXP | Performed by: INTERNAL MEDICINE

## 2023-02-08 PROCEDURE — 93010 EKG 12-LEAD: ICD-10-PCS | Mod: TXP,,, | Performed by: INTERNAL MEDICINE

## 2023-02-08 RX ORDER — ACETAMINOPHEN 325 MG/1
650 TABLET ORAL EVERY 4 HOURS PRN
Status: DISCONTINUED | OUTPATIENT
Start: 2023-02-08 | End: 2023-02-08 | Stop reason: HOSPADM

## 2023-02-08 RX ORDER — SODIUM CHLORIDE 9 MG/ML
INJECTION, SOLUTION INTRAVENOUS
Status: DISCONTINUED | OUTPATIENT
Start: 2023-02-08 | End: 2023-02-08 | Stop reason: HOSPADM

## 2023-02-08 RX ORDER — ONDANSETRON 8 MG/1
8 TABLET, ORALLY DISINTEGRATING ORAL EVERY 8 HOURS PRN
Status: DISCONTINUED | OUTPATIENT
Start: 2023-02-08 | End: 2023-02-08 | Stop reason: HOSPADM

## 2023-02-08 RX ORDER — DIPHENHYDRAMINE HCL 50 MG
50 CAPSULE ORAL ONCE
Status: COMPLETED | OUTPATIENT
Start: 2023-02-08 | End: 2023-02-08

## 2023-02-08 RX ORDER — SODIUM CHLORIDE 9 MG/ML
INJECTION, SOLUTION INTRAVENOUS CONTINUOUS
Status: ACTIVE | OUTPATIENT
Start: 2023-02-08 | End: 2023-02-08

## 2023-02-08 RX ORDER — LIDOCAINE HYDROCHLORIDE 10 MG/ML
INJECTION, SOLUTION EPIDURAL; INFILTRATION; INTRACAUDAL; PERINEURAL
Status: DISCONTINUED | OUTPATIENT
Start: 2023-02-08 | End: 2023-02-08 | Stop reason: HOSPADM

## 2023-02-08 RX ORDER — MIDAZOLAM HYDROCHLORIDE 1 MG/ML
INJECTION, SOLUTION INTRAMUSCULAR; INTRAVENOUS
Status: DISCONTINUED | OUTPATIENT
Start: 2023-02-08 | End: 2023-02-08 | Stop reason: HOSPADM

## 2023-02-08 RX ORDER — HEPARIN SOD,PORCINE/0.9 % NACL 1000/500ML
INTRAVENOUS SOLUTION INTRAVENOUS
Status: DISCONTINUED | OUTPATIENT
Start: 2023-02-08 | End: 2023-02-08 | Stop reason: HOSPADM

## 2023-02-08 RX ORDER — CEFAZOLIN SODIUM 1 G/3ML
INJECTION, POWDER, FOR SOLUTION INTRAMUSCULAR; INTRAVENOUS
Status: DISCONTINUED | OUTPATIENT
Start: 2023-02-08 | End: 2023-02-08 | Stop reason: HOSPADM

## 2023-02-08 RX ADMIN — SODIUM CHLORIDE: 9 INJECTION, SOLUTION INTRAVENOUS at 09:02

## 2023-02-08 RX ADMIN — DIPHENHYDRAMINE HYDROCHLORIDE 50 MG: 50 CAPSULE ORAL at 09:02

## 2023-02-08 NOTE — Clinical Note
The catheter was reinserted into the ostium   right coronary artery. An angiography was performed of the right coronary arteries. Multiple views were taken.

## 2023-02-08 NOTE — DISCHARGE INSTRUCTIONS
Discharge Instructions and Care of Your Groin       Catheter Insertion Site  The morning after your procedure, you may take the dressing off. The easiest way to do this is when you are showering, get the tape and dressing wet and remove it.  After the bandage is removed, cover the area with a small adhesive bandage. It is normal for the catheter insertion site to be black and blue for a couple of days. The site may also be slightly swollen and pink, and there may be a small lump (about the size of a quarter) at the site.  Wash the catheter insertion site at least once daily with soap and water. Place soapy water on your hand or washcloth and gently wash the insertion site; do not rub.  Keep the area clean and dry when you are not showering.  Do not use creams, lotions or ointment on the wound site.  Wear loose clothes and loose underwear.  Do not take a bath, tub soak, go in a Jacuzzi, or swim in a pool or lake for one week after the procedure.     Activity Instructions  Do not strain during bowel movements for the first 3 to 4 days after the procedure to prevent bleeding from the catheter insertion site.  Avoid heavy lifting (more than 10 pounds) and pushing or pulling heavy objects for the first 5 to 7 days after the procedure.  Do not participate in strenuous activities for 5 days after the procedure. This includes most sports - jogging, golfing, play tennis, and bowling.  You may climb stairs if needed, but walk up and down the stairs more slowly than usual.  Gradually increase your activities until you reach your normal activity level within one week after the procedure.        If bleeding should occur following discharge:  Sit down and apply firm pressure to the puncture site with your fingers for 10 minutes   If the bleeding stops, continue to sit quietly, keeping your leg straight for 2 hours. Notify your physician as soon as possible   If bleeding does not stop after 10 minutes or if there is a large amount  of bleeding or spurting, call 911 immediately.  Do not drive yourself to the hospital.      Notify your physician if these symptoms persist or if you experience:  Change in color or temperature of the leg  Redness, heat, or pus at the puncture site  Chills or fever greater than 101.0 F

## 2023-02-08 NOTE — PLAN OF CARE
Received report from JAZMYNE Soliman. Patient s/p [facility], AAOx3. VSS, no c/o pain or discomfort at this time, resp even and unlabored. Gauze/tegaderm dressing to R groin is CDI. No active bleeding. No hematoma noted. Post procedure protocol reviewed with patient and patient's family. Understanding verbalized. Family members at bedside. Nurse call bell within reach. Will continue to monitor per post procedure protocol.

## 2023-02-08 NOTE — Clinical Note
dry, intact, no bleeding and no hematoma. R CFA - PERCLOSE X1.  R CFV - Manual pressure hold.  Gauze and tegaderm dressing.

## 2023-02-08 NOTE — PLAN OF CARE
Patient discharged per MD orders. Instructions given on medications, wound care, activity, signs of infection, when to call MD, and follow up appointments. Pt verbalized understanding. Telemetry and PIV removed. IV removed from L hand. Hematoma formed. MD Duron notified. Will keep extra 15 minutes to make sure stabilized. Patient and family refused transport, ambulated off unit.

## 2023-02-08 NOTE — BRIEF OP NOTE
"    Post Cath Note  Referring Physician: Flo Malone MD  Procedure: CATHETERIZATION, HEART, BOTH LEFT AND RIGHT (N/A), ANGIOGRAM, CORONARY ARTERY (N/A)      : Flo Malone MD     Referring Physician: Flo Malone     All Operators: Surgeon(s):  MD Nicolette Posey MD Oscar Maitas, MD     Preoperative Diagnosis: Atrial fibrillation, unspecified type [I48.91]  Alcoholic cirrhosis of liver without ascites [K70.30]     Postop Diagnosis: Atrial fibrillation, unspecified type [I48.91]  Alcoholic cirrhosis of liver without ascites [K70.30]    Treatments/Procedures: Procedure(s) (LRB):  CATHETERIZATION, HEART, BOTH LEFT AND RIGHT (N/A)  ANGIOGRAM, CORONARY ARTERY (N/A)    Estimated Blood loss: <50 cc       Access: Right CFA    See full report for further details    Intervention:   Successful RHC and LHC with coronary angiography. Has normal coronary arteries. There is mild increase in right sided filling pressures.     Closure device: Perclosure    Post Cath Exam:   BP (!) 151/74 (BP Location: Right arm, Patient Position: Lying)   Pulse 85   Temp 97.9 °F (36.6 °C) (Temporal)   Resp 18   Ht 6' 1" (1.854 m)   Wt 127.5 kg (281 lb)   SpO2 98%   BMI 37.07 kg/m²   No unusual pain, hematoma, thrill or bruit at vascular access site.  Distal pulse present without signs of ischemia.    Recommendations:   - Routine post-cath care  - IVF at 100 ml/hr for 2 hrs  - Please see full report for details    Jayy Aldana   "

## 2023-02-08 NOTE — DISCHARGE SUMMARY
[facility]  Interventional Cardiology  Discharge Summary      Patient Name: Juan Carlos Yoo Sr.  MRN: [id number]  Admission Date: 2/8/2023  Hospital Length of Stay: 0 days  Discharge Date and Time:  02/08/2023 10:19 AM  Attending Physician: Flo Malone MD  Discharging Provider: Jayy Aldana MD  Primary Care Physician: Primary Doctor No    HPI:  67 y.o. male alcohol cirrhosis complicated by ascites, nonbleeding esophageal varices, thrombocytopenia who presents for RHC/LHC as part of an evaluation for liver transplant.     Patient was seen by advanced heart failure clinic. TTE showed an increased PASP of 53 mmHg on 6/15/22 with presented EF and atrial fibrillation. Repeat TTE on 12/23 showed improved PASP of 28 mmHg.      Patient states that he was scheduled for a colonoscopy as part of transplant workup. Colonoscopy was canceled when telemetry showed that he was in new onset Afib. Regarding his symptoms, he reports SOB with mild exertion. MACK has been present for about 2 year, but has worsened recently. He denies chest pain. He has never had an angiogram in the past.        Procedure(s) (LRB):  CATHETERIZATION, HEART, BOTH LEFT AND RIGHT (N/A)  ANGIOGRAM, CORONARY ARTERY (N/A)     Hospital Course (synopsis of major diagnoses, care, treatment, and services provided during the course of the hospital stay): Successful RHC and LHC with coronary angiography. Has normal coronary arteries. There is mild increase in right sided filling pressures.     Pending Diagnostic Studies:       None          There are no hospital problems to display for this patient.      Discharged Condition: good    Follow Up:    Patient Instructions:   No discharge procedures on file.  Medications:  Reconciled Home Medications:      Medication List        CONTINUE taking these medications      amLODIPine 10 MG tablet  Commonly known as: NORVASC     apixaban 5 mg Tab  Commonly known as: ELIQUIS  Take 1 tablet (5 mg total) by mouth 2 (two)  times daily.     ascorbic acid (vitamin C) 500 MG tablet  Commonly known as: VITAMIN C  Take 500 mg by mouth.     cyanocobalamin 100 MCG tablet  Commonly known as: VITAMIN B-12  Take 100 mcg by mouth once daily.     fish oil-omega-3 fatty acids 300-1,000 mg capsule  Take 1 g by mouth 2 (two) times daily.     furosemide 40 MG tablet  Commonly known as: LASIX  Take 1 tablet (40 mg total) by mouth 2 (two) times a day.     nadoloL 20 MG tablet  Commonly known as: CORGARD  Take 2 tablets (40 mg total) by mouth every evening.     pantoprazole 40 MG tablet  Commonly known as: PROTONIX  Take 1 tablet (40 mg total) by mouth once daily.     spironolactone 50 MG tablet  Commonly known as: ALDACTONE  Take 1 tablet (50 mg total) by mouth once daily.     tamsulosin 0.4 mg Cap  Commonly known as: FLOMAX  Take 1 capsule (0.4 mg total) by mouth every evening.            STOP taking these medications      aspirin 81 MG EC tablet  Commonly known as: ECOTRIN     clopidogreL 75 mg tablet  Commonly known as: PLAVIX              Time spent on the discharge of patient: 20 minutes    Jayy Aldana MD  Interventional Cardiology  [facility]

## 2023-02-08 NOTE — Clinical Note
50 ml of contrast were injected throughout the case. 50 mL of contrast was the total wasted during the case. 100 mL was the total amount used during the case.

## 2023-02-08 NOTE — INTERVAL H&P NOTE
The patient has been examined and the H&P has been reviewed:    I concur with the findings and no changes have occurred since H&P was written.    Anesthesia/Surgery risks, benefits and alternative options discussed and understood by patient/family.    Assessment:   Alcoholic cirrhosis  Being evaluated by the Transplant team. Needs RHC/LHC as part of his evaluation.      Longstanding persistent atrial fibrillation  Diagnosed 1/2022 while admitted for encephalopathy. On Eliquis.      Pulmonary hypertension  History of PASP >50 mmHg. Plan for RHC at the time of LHC.      Aortic atherosclerosis  See imaging. Follow up with Cardiology.      Plan:      -Will proceed with LHC/RHC.   - Anti-platelet Therapy: ASA/Plavix  - Access: right femoral  - Creatinine/CrCl: 1.2  - Allergies: none  - All patient's questions were answered.  -The risks, benefits and alternatives of the procedure were explained to the patient.   -The risks of coronary angiography include but are not limited to: bleeding, infection, heart rhythm abnormalities, allergic reactions, kidney injury and potential need for dialysis, stroke and death.   - Should stenting be indicated, the patient has agreed to dual anti-platelet therapy for 1-consecutive year with a drug-eluting stent and a minimum of 1-month with the use of a bare metal stent  - Additionally, pt is aware that non-compliance is likely to result in stent clotting with heart attack, heart failure, and/or death  -The risks of moderate sedation include hypotension, respiratory depression, arrhythmias, bronchospasm, and death.   - Informed consent was obtained and the  patient is agreeable to proceed with the procedure.         There are no hospital problems to display for this patient.

## 2023-02-20 ENCOUNTER — TELEPHONE (OUTPATIENT)
Dept: TRANSPLANT | Facility: CLINIC | Age: 68
End: 2023-02-20
Payer: COMMERCIAL

## 2023-02-20 ENCOUNTER — TELEPHONE (OUTPATIENT)
Dept: SLEEP MEDICINE | Facility: OTHER | Age: 68
End: 2023-02-20
Payer: COMMERCIAL

## 2023-02-20 NOTE — TELEPHONE ENCOUNTER
Talked to patient a couple times and sent out a message through my chart to schedule the home sleep study.  No response.

## 2023-02-20 NOTE — TELEPHONE ENCOUNTER
Deanna Cota MD  P [facility] Pre-Liver Transplant Clinical  [facility] report summary:   1. Salt water and sugar restriction.   2. Dietary modification.   3. Cleared for liver transplant from a coronary standpoint.   4. Risk factor modifications for early coronary disease or recommended.       Recommendations:   -  Restrict sodium intake to 2 gm per 24-hour day.   -  See his PCP for sugar restriction.     -  Low carb diet to help reduce weight.   -  Increase spironolactone dose to 100 mg daily.  If edema persistent and wt loss <2 lb per week, increase spironolactone to 150 mg daily.     -  CMP in a week.     ________________________________    Called patient to discuss the above information and recommendations.  Unable to reach. Voice mail box full on home phone and mobile just rings and rings.   Called wife's phone number. Went straight to voicemail. Left message for patient to call back and why.

## 2023-02-22 ENCOUNTER — TELEPHONE (OUTPATIENT)
Dept: TRANSPLANT | Facility: CLINIC | Age: 68
End: 2023-02-22
Payer: COMMERCIAL

## 2023-02-22 DIAGNOSIS — Z76.82 ORGAN TRANSPLANT CANDIDATE: Primary | ICD-10-CM

## 2023-02-22 NOTE — TELEPHONE ENCOUNTER
UPDATE. Patient in EVALUATION    Deanna Cota MD  P [facility] Pre-Liver Transplant Clinical  [facility], [facility] report summary:     1. Salt, water and sugar restriction. - Patient is aware. States he has been eating out a lot and eating donuts. Patient states he will watch his salt, water, and sugar.   2. Dietary modification. - Patient states he is aware and will do better.  3. Cleared for liver transplant from a coronary standpoint. - Patient states the cardiologist said that he has no blockages.   4. Risk factor modifications for early coronary disease or recommended.       Recommendations:     -  Restrict sodium intake to 2 gm per 24-hour day. - Patient is aware  -  See his PCP for sugar restriction.   - Patient has an appointment with PCP in March  -  Low carb diet to help reduce weight. - Patient is aware of this recommendation  -  Increase spironolactone dose to 100 mg daily. - Patient will increase to 100 mg and verbally repeated dosage change back to me.     If edema persistent and wt loss <2 lb per week, increase spironolactone to 150 mg daily.   - Patient has an appointment with Dr. Cota on 2/27/23 to discuss medication management.  -  CMP in a week. - Patient has labs on 2/27/23    -Girlfriend lives in [place]. She was going to be a caregiver. However patient is conserved with the girlfriend has early stages of dementia. Patient states his girlfriend has become very forgetful and her son has moved in to help her. Girlfriend unable to drive now because she gets lost while driving.   Patient has an adult son that can be a caregiver but he does not have a second caregiver.  Patient will see  on 2/27/23.    -Orders placed for EGD and Colonoscopy. Patient will call to schedule.    -Will present patient to committee when all consults and testing is complete.

## 2023-02-27 ENCOUNTER — LAB VISIT (OUTPATIENT)
Dept: LAB | Facility: HOSPITAL | Age: 68
End: 2023-02-27
Attending: INTERNAL MEDICINE
Payer: COMMERCIAL

## 2023-02-27 ENCOUNTER — OFFICE VISIT (OUTPATIENT)
Dept: TRANSPLANT | Facility: CLINIC | Age: 68
End: 2023-02-27
Payer: COMMERCIAL

## 2023-02-27 ENCOUNTER — SOCIAL WORK (OUTPATIENT)
Dept: TRANSPLANT | Facility: CLINIC | Age: 68
End: 2023-02-27
Payer: COMMERCIAL

## 2023-02-27 VITALS
TEMPERATURE: 97 F | DIASTOLIC BLOOD PRESSURE: 74 MMHG | OXYGEN SATURATION: 99 % | BODY MASS INDEX: 37.51 KG/M2 | WEIGHT: 283.06 LBS | SYSTOLIC BLOOD PRESSURE: 149 MMHG | RESPIRATION RATE: 18 BRPM | HEIGHT: 73 IN | HEART RATE: 87 BPM

## 2023-02-27 DIAGNOSIS — Z76.82 ORGAN TRANSPLANT CANDIDATE: ICD-10-CM

## 2023-02-27 LAB
ALBUMIN SERPL BCP-MCNC: 3.1 G/DL (ref 3.5–5.2)
ALP SERPL-CCNC: 58 U/L (ref 55–135)
ALT SERPL W/O P-5'-P-CCNC: 9 U/L (ref 10–44)
AMPHET+METHAMPHET UR QL: NEGATIVE
ANION GAP SERPL CALC-SCNC: 10 MMOL/L (ref 8–16)
AST SERPL-CCNC: 20 U/L (ref 10–40)
BARBITURATES UR QL SCN>200 NG/ML: NEGATIVE
BASOPHILS # BLD AUTO: 0.05 K/UL (ref 0–0.2)
BASOPHILS NFR BLD: 0.8 % (ref 0–1.9)
BENZODIAZ UR QL SCN>200 NG/ML: NEGATIVE
BILIRUB SERPL-MCNC: 1.5 MG/DL (ref 0.1–1)
BUN SERPL-MCNC: 16 MG/DL (ref 8–23)
BZE UR QL SCN: NEGATIVE
CALCIUM SERPL-MCNC: 9.3 MG/DL (ref 8.7–10.5)
CANNABINOIDS UR QL SCN: NEGATIVE
CHLORIDE SERPL-SCNC: 98 MMOL/L (ref 95–110)
CO2 SERPL-SCNC: 30 MMOL/L (ref 23–29)
CREAT SERPL-MCNC: 1.2 MG/DL (ref 0.5–1.4)
CREAT UR-MCNC: 18 MG/DL (ref 23–375)
DIFFERENTIAL METHOD: ABNORMAL
EOSINOPHIL # BLD AUTO: 0.2 K/UL (ref 0–0.5)
EOSINOPHIL NFR BLD: 3.4 % (ref 0–8)
ERYTHROCYTE [DISTWIDTH] IN BLOOD BY AUTOMATED COUNT: 13.9 % (ref 11.5–14.5)
EST. GFR  (NO RACE VARIABLE): >60 ML/MIN/1.73 M^2
ETHANOL UR-MCNC: <10 MG/DL
GLUCOSE SERPL-MCNC: 136 MG/DL (ref 70–110)
HCT VFR BLD AUTO: 40 % (ref 40–54)
HGB BLD-MCNC: 13.2 G/DL (ref 14–18)
IMM GRANULOCYTES # BLD AUTO: 0.01 K/UL (ref 0–0.04)
IMM GRANULOCYTES NFR BLD AUTO: 0.2 % (ref 0–0.5)
INR PPP: 1.4 (ref 0.8–1.2)
LYMPHOCYTES # BLD AUTO: 1.8 K/UL (ref 1–4.8)
LYMPHOCYTES NFR BLD: 27.5 % (ref 18–48)
MCH RBC QN AUTO: 31.1 PG (ref 27–31)
MCHC RBC AUTO-ENTMCNC: 33 G/DL (ref 32–36)
MCV RBC AUTO: 94 FL (ref 82–98)
METHADONE UR QL SCN>300 NG/ML: NEGATIVE
MONOCYTES # BLD AUTO: 0.9 K/UL (ref 0.3–1)
MONOCYTES NFR BLD: 14.2 % (ref 4–15)
NEUTROPHILS # BLD AUTO: 3.5 K/UL (ref 1.8–7.7)
NEUTROPHILS NFR BLD: 53.9 % (ref 38–73)
NRBC BLD-RTO: 0 /100 WBC
OPIATES UR QL SCN: NEGATIVE
PCP UR QL SCN>25 NG/ML: NEGATIVE
PLATELET # BLD AUTO: 97 K/UL (ref 150–450)
PMV BLD AUTO: 12.4 FL (ref 9.2–12.9)
POTASSIUM SERPL-SCNC: 3.6 MMOL/L (ref 3.5–5.1)
PROT SERPL-MCNC: 7.4 G/DL (ref 6–8.4)
PROTHROMBIN TIME: 14.1 SEC (ref 9–12.5)
RBC # BLD AUTO: 4.25 M/UL (ref 4.6–6.2)
SODIUM SERPL-SCNC: 138 MMOL/L (ref 136–145)
TOXICOLOGY INFORMATION: ABNORMAL
WBC # BLD AUTO: 6.48 K/UL (ref 3.9–12.7)

## 2023-02-27 PROCEDURE — 3288F FALL RISK ASSESSMENT DOCD: CPT | Mod: CPTII,S$GLB,TXP, | Performed by: INTERNAL MEDICINE

## 2023-02-27 PROCEDURE — 3008F PR BODY MASS INDEX (BMI) DOCUMENTED: ICD-10-PCS | Mod: CPTII,S$GLB,TXP, | Performed by: INTERNAL MEDICINE

## 2023-02-27 PROCEDURE — 1159F PR MEDICATION LIST DOCUMENTED IN MEDICAL RECORD: ICD-10-PCS | Mod: CPTII,S$GLB,TXP, | Performed by: INTERNAL MEDICINE

## 2023-02-27 PROCEDURE — 1126F PR PAIN SEVERITY QUANTIFIED, NO PAIN PRESENT: ICD-10-PCS | Mod: CPTII,S$GLB,TXP, | Performed by: INTERNAL MEDICINE

## 2023-02-27 PROCEDURE — 3078F PR MOST RECENT DIASTOLIC BLOOD PRESSURE < 80 MM HG: ICD-10-PCS | Mod: CPTII,S$GLB,TXP, | Performed by: INTERNAL MEDICINE

## 2023-02-27 PROCEDURE — 3008F BODY MASS INDEX DOCD: CPT | Mod: CPTII,S$GLB,TXP, | Performed by: INTERNAL MEDICINE

## 2023-02-27 PROCEDURE — 3077F PR MOST RECENT SYSTOLIC BLOOD PRESSURE >= 140 MM HG: ICD-10-PCS | Mod: CPTII,S$GLB,TXP, | Performed by: INTERNAL MEDICINE

## 2023-02-27 PROCEDURE — 99999 PR PBB SHADOW E&M-EST. PATIENT-LVL IV: CPT | Mod: PBBFAC,TXP,, | Performed by: INTERNAL MEDICINE

## 2023-02-27 PROCEDURE — 99999 PR PBB SHADOW E&M-EST. PATIENT-LVL I: ICD-10-PCS | Mod: PBBFAC,TXP,,

## 2023-02-27 PROCEDURE — 3288F PR FALLS RISK ASSESSMENT DOCUMENTED: ICD-10-PCS | Mod: CPTII,S$GLB,TXP, | Performed by: INTERNAL MEDICINE

## 2023-02-27 PROCEDURE — 1101F PT FALLS ASSESS-DOCD LE1/YR: CPT | Mod: CPTII,S$GLB,TXP, | Performed by: INTERNAL MEDICINE

## 2023-02-27 PROCEDURE — 85610 PROTHROMBIN TIME: CPT | Mod: TXP | Performed by: INTERNAL MEDICINE

## 2023-02-27 PROCEDURE — 99999 PR PBB SHADOW E&M-EST. PATIENT-LVL I: CPT | Mod: PBBFAC,TXP,,

## 2023-02-27 PROCEDURE — 99215 PR OFFICE/OUTPT VISIT, EST, LEVL V, 40-54 MIN: ICD-10-PCS | Mod: S$GLB,TXP,, | Performed by: INTERNAL MEDICINE

## 2023-02-27 PROCEDURE — 1159F MED LIST DOCD IN RCRD: CPT | Mod: CPTII,S$GLB,TXP, | Performed by: INTERNAL MEDICINE

## 2023-02-27 PROCEDURE — 99215 OFFICE O/P EST HI 40 MIN: CPT | Mod: S$GLB,TXP,, | Performed by: INTERNAL MEDICINE

## 2023-02-27 PROCEDURE — 1126F AMNT PAIN NOTED NONE PRSNT: CPT | Mod: CPTII,S$GLB,TXP, | Performed by: INTERNAL MEDICINE

## 2023-02-27 PROCEDURE — 80307 DRUG TEST PRSMV CHEM ANLYZR: CPT | Mod: TXP | Performed by: INTERNAL MEDICINE

## 2023-02-27 PROCEDURE — 80053 COMPREHEN METABOLIC PANEL: CPT | Mod: TXP | Performed by: INTERNAL MEDICINE

## 2023-02-27 PROCEDURE — 80321 ALCOHOLS BIOMARKERS 1OR 2: CPT | Mod: TXP | Performed by: INTERNAL MEDICINE

## 2023-02-27 PROCEDURE — 3078F DIAST BP <80 MM HG: CPT | Mod: CPTII,S$GLB,TXP, | Performed by: INTERNAL MEDICINE

## 2023-02-27 PROCEDURE — 85025 COMPLETE CBC W/AUTO DIFF WBC: CPT | Mod: TXP | Performed by: INTERNAL MEDICINE

## 2023-02-27 PROCEDURE — 99999 PR PBB SHADOW E&M-EST. PATIENT-LVL IV: ICD-10-PCS | Mod: PBBFAC,TXP,, | Performed by: INTERNAL MEDICINE

## 2023-02-27 PROCEDURE — 3077F SYST BP >= 140 MM HG: CPT | Mod: CPTII,S$GLB,TXP, | Performed by: INTERNAL MEDICINE

## 2023-02-27 PROCEDURE — 1101F PR PT FALLS ASSESS DOC 0-1 FALLS W/OUT INJ PAST YR: ICD-10-PCS | Mod: CPTII,S$GLB,TXP, | Performed by: INTERNAL MEDICINE

## 2023-02-27 NOTE — LETTER
February 27, 2023        Aruna Miranda  [address]  [place] [address]  Phone: [phone]  Fax: [phone]             [facility]  [address]  [facility] [address]  Phone: [phone]   Patient: Juan Carlos Yoo Sr.   MR Number: [id number]   YOB: 1955   Date of Visit: 2/27/2023       Dear Dr. Aruna Miranda    Thank you for referring Juan Carlos Yoo to me for evaluation. Attached you will find relevant portions of my assessment and plan of care.    If you have questions, please do not hesitate to call me. I look forward to following Juan Carlos Yoo along with you.    Sincerely,    Deanna Cota MD    Enclosure    If you would like to receive this communication electronically, please contact [email or web address] or [phone] to request [employer] access.    [employer] is a tool which provides read-only access to select patient information with whom you have a relationship. Its easy to use and provides real time access to review your patients record including encounter summaries, notes, results, and demographic information.    If you feel you have received this communication in error or would no longer like to receive these types of communications, please e-mail [email or web address]

## 2023-02-27 NOTE — PATIENT INSTRUCTIONS
Recommendations:  -  Present to IR conf to review liver lesion 1.3 cm, indeterminate, will need to be monitored for growth, and if growing, wait till size reaches 2 cm, so we can ask for MELD exception.    -  Back-up Caregiver will be his daughter - they still need to ask her.    -  Labs every 3 months, starting :  CBC, CMP, PT INR  -  MRI of abdomen and AFP every 3 months, next due in 5/2023   -  Cardiology appt (heart failure) for pulm hypertension.   -  Low salt in the diet, avoid canned, bottled and processed foods.  -  Continue current meds  -  Avoid alcohol, smoking, sedatives and meds with codeine.  -  Use sliding scale for lactulose as follows:  Take 1 cup 30 mL at 8AM.  If no stool by 12 noon, take 30 mL more of lactulose.  If <2 stools by 3 PM, take 30 mL more of lactulose.  Goal is 3-4 soft stools daily.   -  Avoid high intake of Tylenol (more than 4 extra-strength pills in one day)  -  Call us if any bleeding, fevers, confusion, disorientation occur  -  Endoscopies: to be done here in GI.   -  Transplant evaluation almost completed, except for endoscopies.   -  Return in 3 months.

## 2023-02-27 NOTE — PROGRESS NOTES
"Psychosocial Follow Up    Transplant SW met with patient in clinic to follow up regarding caregiver plan and positive Peth results.     Patient presented with his son, Juan Carlos Yoo (ph: [phone]), who will be patients primary caregiver. Patient's son was given education regarding primary caregiver role. Patient's son verbalizes ability and commitment to be patient's caregiver. Patient's son works for his family business and is able to take time off, drives, and has reliable transportation. Patient reports he is still working on a secondary caregiver. Patient's long distance girlfriend was previously identified as a caregiver but recently having health concerns. Patient and son will speak with family/friends and contact SW or coordinator once they have a secondary caregiver identified so team can verify.     In regards to IOP/AA recommendations made by Transplant SW and Hepatologist, patient denies any engagement with either at this time. Patient has had 3 positive Peth results in October 2022 (60ng/mL), December 2022 (19ng/mL) and January 2023 (49ng/mL). Patient denied alcohol use when confronted with October and December results. Patient does admit to alcohol use during the "holidays". Patient's results indicate "20 - 200 ng/mL: Moderate alcohol consumption (up to 4 drinks per day for several days a week)". Patient was unable to quantify how much he drank, stating "I don't remember", and reports it was just one day and denied heavy use. SW discussed that due to relapse and ongoing positive alcohol levels, patient should engage in both AA and IOP to show level of commitment to sobriety and relapse prevention. Patient reports past IOP completion at [facility] and notes he does not prefer to do this again. Patient reports past engagement in AA and prefers this over IOP. Patient states he has been able to maintain sobriety for over a year in the past. Patient reports he has motivation to quit use due " to having grandchildren and verbalizes understanding he cannot have any alcohol moving forward, pre and post transplant. However, patient would like to think about if he would want to engage in AA or IOP. SW provided resources and AA logs. Patient and son verbalize understanding of recommendations.     Patient and son deny any other updates, questions or concerns at this time. Patient understands of need to identify secondary caregiver plan and recommendations about IOP/AA for transplant candidacy. Due to positive Peth results and ambivalence regarding alcohol use and treatment recommendations, SW team also recommends patient show ongoing sobriety with serial/random negative Peth results. SW forwarding update to patient's coordinator and hepatologist. Final determination of transplant candidacy will be made once evaluation is complete and reviewed by the Liver Transplant Selection Committee.

## 2023-02-27 NOTE — Clinical Note
Recommendations: -  Present to IR conf to review liver lesion 1.3 cm, indeterminate, will need to be monitored for growth, and if growing, wait till size reaches 2 cm, so we can ask for MELD exception.   -  Back-up Caregiver will be his daughter - they still need to ask her.   -  Labs every 3 months, starting :  CBC, CMP, PT INR -  MRI of abdomen and AFP every 3 months, next due in 5/2023  -  Cardiology appt (heart failure) for pulm hypertension.  -  Low salt in the diet, avoid canned, bottled and processed foods. -  Continue current meds -  Avoid alcohol, smoking, sedatives and meds with codeine. -  Use sliding scale for lactulose  -  Avoid high intake of Tylenol  -  Call us if any bleeding, fevers, confusion, disorientation occur -  Endoscopies: to be done here in GI.  -  Transplant evaluation almost completed, pending endoscopies.  -  Return in 3 months.

## 2023-02-27 NOTE — PROGRESS NOTES
[facility] Consultation Note    Reason for Consult:  The encounter diagnosis was Organ transplant candidate.    PCP: Primary Doctor No       HPI:  This is a 67 y.o. male here for evaluation of: liver transplant referral for decomp cirrhosis with fatigue, hepatic encephalopathy, thrombocytopenia, eso varices wo bleeding.  EGD and colonoscopy were supposed to be done 2 months ago, he was prepped, and anesthesia did not want to go forward, he was sent to ECHO: A Fib cardioverted 3 times did not stop the arrhythmia, but in the ER he got a shot, and heart converted to sins rhythm, no more A Fib since.     ECHO 6/15/22:   The left ventricle is normal in size with normal systolic function.  The estimated ejection fraction is 55-60%.  The quantitatively derived ejection fraction is 56%.  A diastolic pattern consistent with atrial fibrillation observed.  Severe left atrial enlargement.  Mild right ventricular enlargement with normal right ventricular systolic function.  Severe right atrial enlargement.  Mild aortic regurgitation.  Mild mitral regurgitation.  The estimated PA systolic pressure is 53 mmHg.  There is moderate pulmonary hypertension.  Mild tricuspid regurgitation.  Intermediate central venous pressure (8 mmHg).     Repeat ECHO normal.   Cardiac cath also minimal vasc abnormality, no signific coronary obstruction.     Liver lesion 1.3 cm, indeterminate, possible HCC.     OCTAVIA Miranda note 10/6/22:  He has mentioned drinking heavily several weeks before the blood work due to rectal pressure, but he needs to be followed by transplant at this point due to the severity of his liver disease.     MELD was 23 on 9/29/22.  17 today.      MELD-Na score: 16 at 1/17/2023  3:12 PM  MELD score: 16 at 1/17/2023  3:12 PM  Calculated from:  Serum Creatinine: 1.2 mg/dL at 1/17/2023  3:12 PM  Serum Sodium: 138 mmol/L (Using max of 137 mmol/L) at 1/17/2023  3:12 PM  Total Bilirubin: 3.3 mg/dL at 1/17/2023  3:12  PM  INR(ratio): 1.3 at 1/17/2023  3:12 PM  Age: 67 years     US abd 7/1/22:  The liver is normal in size measuring 14.2 cm in length. The liver is heterogeneous in echogenicity and nodular in contour compatible with cirrhosis.  No focal hepatic lesions are identified.  The main portal vein appears patent with antegrade flow.  No intrahepatic biliary ductal dilatation is detected. The common bile duct is within normal limits at 0.4 cm.  The gallbladder is absent.  The right kidney is normal in size measuring 12.2 cm with no evidence of hydronephrosis.  A possible 0.7 cm nonobstructing right renal calculus is noted in the superior pole.  A 0.9 cm cyst is noted in the superior pole of the right kidney.  The spleen is enlarged measuring 13.1 x 5.5 cm.     Impression:   1. Heterogeneous nodular liver compatible with cirrhosis.  2. Cholecystectomy.  3. Splenomegaly.  4. Possible 0.7 cm nonobstructing right renal calculus.  5. Right renal cyst.      Elevated liver enzymes: Yes  Abnormal imaging: Yes  Cirrhosis: Yes  Hepatitis C: No  Hepatitis B: No  Fatty liver: No  Encephalopathy: Yes  Post-hospital discharge: No  Symptoms: Mild swelling in the legs.      Primary hepatic manifestations:  Fatigue:Yes  Edema:Yes  Ascites:Yes - para x 2 in the last year.  Encephalopathy:Yes  Abdominal pain:No  GI bleeds: No  Pruritus:No  Weight Changes:No  Changes in Bowel habits: No  Muscle cramps:No    Risk factors for liver disease:  No jaundice  No transfusions  No IVDU  Did not snort cocaine or similar agents  Did not live with anyone with hepatitis B or C  Sexual partner not tested  No hepatotoxic medications  No exposure to industrial toxins  Alcohol:       ROS:  Constitutional: No fevers, chills, weight changes, fatigue  ENT: No allergies, nosebleeds,   CV: No chest pain  Pulm: No cough, shortness of breath  Ophtho: No vision changes  GI/Liver: see HPI  Derm: No rash, itching  Heme: No swollen glands, bruising  MSK: No joint pains,  joint swelling  : No dysuria, hematuria, decrease in urine output  Endo: No hot or cold intolerance  Neuro: No confusion, disorientation, difficulty with sleep, memory, concentration, syncope, seizure  Psych: No anxiety, depression    Medical History:  has a past medical history of Atrial fibrillation, Bulging disc, Cirrhosis, Colon polyp (12/29/2017), EV (esophageal varices), Skin cancer (03/2017), and Thrombocytopenia.    Surgical History:  has a past surgical history that includes Cholecystectomy; Tonsillectomy; Vasectomy; Skin biopsy (03/2017); Hernia repair; Upper gastrointestinal endoscopy (2011); Upper gastrointestinal endoscopy (2016); Colonoscopy; Colonoscopy (N/A, 12/29/2017); Catheterization of both left and right heart (N/A, 2/8/2023); and Coronary angiography (N/A, 2/8/2023).    Family History: family history includes Cancer in his maternal uncle; Heart disease in his maternal uncle; Hyperlipidemia in his brother; Ovarian cancer in his sister..     Social History:  reports that he has never smoked. He has never used smokeless tobacco. He reports that he does not currently use alcohol. He reports that he does not use drugs.    Review of patient's allergies indicates:  No Known Allergies    Current Outpatient Rx   Medication Sig Dispense Refill    amLODIPine (NORVASC) 10 MG tablet       apixaban (ELIQUIS) 5 mg Tab Take 1 tablet (5 mg total) by mouth 2 (two) times daily. 60 tablet 11    ascorbic acid, vitamin C, (VITAMIN C) 500 MG tablet Take 500 mg by mouth.      cyanocobalamin (VITAMIN B-12) 100 MCG tablet Take 100 mcg by mouth once daily.      fish oil-omega-3 fatty acids 300-1,000 mg capsule Take 1 g by mouth 2 (two) times daily.      furosemide (LASIX) 40 MG tablet Take 1 tablet (40 mg total) by mouth 2 (two) times a day. 30 tablet 6    nadoloL (CORGARD) 20 MG tablet Take 2 tablets (40 mg total) by mouth every evening. 90 tablet 2    pantoprazole (PROTONIX) 40 MG tablet Take 1 tablet (40 mg total)  "by mouth once daily. 90 tablet 3    spironolactone (ALDACTONE) 50 MG tablet Take 1 tablet (50 mg total) by mouth once daily. 90 tablet 3    tamsulosin (FLOMAX) 0.4 mg Cap Take 1 capsule (0.4 mg total) by mouth every evening. 30 capsule 6       Objective Findings:    Vital Signs:  BP (!) 149/74 (BP Location: Left arm, Patient Position: Sitting, BP Method: Medium (Automatic))   Pulse 87   Temp 97.2 °F (36.2 °C) (Temporal)   Resp 18   Ht 6' 1" (1.854 m)   Wt 128.4 kg (283 lb 1.1 oz)   SpO2 99%   BMI 37.35 kg/m²   Body mass index is 37.35 kg/m².    Physical Exam:  General Appearance: Well appearing in no acute distress  Head:   Normocephalic, without obvious abnormality  Eyes:    No scleral icterus, EOMI  ENT: Neck supple, Lips, mucosa, and tongue normal; teeth and gums normal  Lungs: CTA bilaterally in anterior and posterior fields, no wheezes, no crackles.  Heart:  Regular rate and rhythm, S1, S2 normal, no murmurs heard  Abdomen: Soft, non tender, non distended with positive bowel sounds in all four quadrants. No hepatosplenomegaly, ascites, or mass  Extremities: 2+ pulses, no clubbing, cyanosis or edema  Skin: No rash  Neurologic: CN II-XII intact, alert, oriented x 3. No asterixis      Labs:  Lab Results   Component Value Date    WBC 6.10 02/08/2023    HGB 13.0 (L) 02/08/2023    HCT 38.6 (L) 02/08/2023     (L) 02/08/2023    CHOL 178 04/01/2022    TRIG 73 04/01/2022    HDL 22 (L) 04/01/2022    INR 1.3 (H) 02/08/2023    CREATININE 1.2 02/08/2023    BUN 13 02/08/2023    BILITOT 3.3 (H) 01/17/2023    ALT 9 (L) 01/17/2023    AST 19 01/17/2023    ALKPHOS 54 (L) 01/17/2023     02/08/2023    K 3.4 (L) 02/08/2023    CL 98 02/08/2023    CO2 27 02/08/2023    TSH 2.208 12/13/2022    PSA 0.18 09/29/2022    HGBA1C 4.5 12/13/2022    AFP 7.3 (H) 12/27/2022       Imaging:       Endoscopy:      Assessment:  1. Organ transplant candidate    Decomp. cirrhosis, with HE, ascites (controlled), edema (better now), mild " icterus, moderate Pulm Hypertension, Drank since he was in high school, but had fallen out of bed in Janury 2023, had severe abd pain, and had passed out when he was taken to [facility], [place].  Stopped drinking alcohol after the fall, then drank 4 weeks ago (mid- Sept 2022) for 2 weeks.  Has not had any alcohol for 2 weeks.  MELD was 23 on 9/29/22.  Improving with MELD 17 today 10/17/22.   AFP normal. Creat normal.   First ECHO with pulm HTN due to fluid overload, but second was normal.   Liver imaging positive for: Liver lesion 1.3 cm, indeterminate, possible HCC.     Recommendations:  -  Present to IR conf to review liver lesion 1.3 cm, indeterminate, will need to be monitored for growth, and if growing, wait till size reaches 2 cm, so we can ask for MELD exception.    -  Back-up Caregiver will be his daughter - they still need to ask her.    -  Labs every 3 months, starting :  CBC, CMP, PT INR  -  MRI of abdomen and AFP every 3 months, next due in 5/2023   -  Cardiology appt (heart failure) for pulm hypertension.   -  Low salt in the diet, avoid canned, bottled and processed foods.  -  Continue current meds  -  Avoid alcohol, smoking, sedatives and meds with codeine.  -  Use sliding scale for lactulose as follows:  Take 1 cup 30 mL at 8AM.  If no stool by 12 noon, take 30 mL more of lactulose.  If <2 stools by 3 PM, take 30 mL more of lactulose.  Goal is 3-4 soft stools daily.   -  Avoid high intake of Tylenol (more than 4 extra-strength pills in one day)  -  Call us if any bleeding, fevers, confusion, disorientation occur  -  Endoscopies: to be done here in GI.   -  Transplant evaluation almost completed, except for endoscopies.   -  Return in 3 months.       No follow-ups on file.      Order summary:  No orders of the defined types were placed in this encounter.        Thank you so much for allowing me to participate in the care of Juan Carlos CECILIA Cota MD

## 2023-03-02 LAB
CLINICAL BIOCHEMIST REVIEW: NORMAL
PLPETH BLD-MCNC: NORMAL NG/ML
POPETH BLD-MCNC: 12 NG/ML

## 2023-03-28 ENCOUNTER — HOSPITAL ENCOUNTER (OUTPATIENT)
Dept: RADIOLOGY | Facility: HOSPITAL | Age: 68
Discharge: HOME | End: 2023-03-28
Attending: INTERNAL MEDICINE
Payer: COMMERCIAL

## 2023-03-28 ENCOUNTER — LAB VISIT (OUTPATIENT)
Dept: LAB | Facility: HOSPITAL | Age: 68
End: 2023-03-28
Attending: INTERNAL MEDICINE
Payer: COMMERCIAL

## 2023-03-28 DIAGNOSIS — Z76.82 ORGAN TRANSPLANT CANDIDATE: ICD-10-CM

## 2023-03-28 DIAGNOSIS — K70.30 ALCOHOLIC CIRRHOSIS OF LIVER WITHOUT ASCITES: ICD-10-CM

## 2023-03-28 DIAGNOSIS — K76.9 LIVER DISEASE, UNSPECIFIED: ICD-10-CM

## 2023-03-28 LAB
AFP SERPL-MCNC: 6.2 NG/ML (ref 0–8.4)
ALBUMIN SERPL BCP-MCNC: 3 G/DL (ref 3.5–5.2)
ALP SERPL-CCNC: 52 U/L (ref 55–135)
ALT SERPL W/O P-5'-P-CCNC: 8 U/L (ref 10–44)
ANION GAP SERPL CALC-SCNC: 7 MMOL/L (ref 8–16)
AST SERPL-CCNC: 21 U/L (ref 10–40)
BASOPHILS # BLD AUTO: 0.06 K/UL (ref 0–0.2)
BASOPHILS NFR BLD: 1.1 % (ref 0–1.9)
BILIRUB SERPL-MCNC: 1.5 MG/DL (ref 0.1–1)
BUN SERPL-MCNC: 12 MG/DL (ref 8–23)
CALCIUM SERPL-MCNC: 9 MG/DL (ref 8.7–10.5)
CHLORIDE SERPL-SCNC: 100 MMOL/L (ref 95–110)
CO2 SERPL-SCNC: 33 MMOL/L (ref 23–29)
CREAT SERPL-MCNC: 1 MG/DL (ref 0.5–1.4)
DIFFERENTIAL METHOD: ABNORMAL
EOSINOPHIL # BLD AUTO: 0.2 K/UL (ref 0–0.5)
EOSINOPHIL NFR BLD: 3.7 % (ref 0–8)
ERYTHROCYTE [DISTWIDTH] IN BLOOD BY AUTOMATED COUNT: 13.2 % (ref 11.5–14.5)
EST. GFR  (NO RACE VARIABLE): >60 ML/MIN/1.73 M^2
GLUCOSE SERPL-MCNC: 104 MG/DL (ref 70–110)
HCT VFR BLD AUTO: 38.4 % (ref 40–54)
HGB BLD-MCNC: 12.8 G/DL (ref 14–18)
IMM GRANULOCYTES # BLD AUTO: 0.02 K/UL (ref 0–0.04)
IMM GRANULOCYTES NFR BLD AUTO: 0.4 % (ref 0–0.5)
INR PPP: 1.2 (ref 0.8–1.2)
LYMPHOCYTES # BLD AUTO: 1.7 K/UL (ref 1–4.8)
LYMPHOCYTES NFR BLD: 29.8 % (ref 18–48)
MCH RBC QN AUTO: 30.7 PG (ref 27–31)
MCHC RBC AUTO-ENTMCNC: 33.3 G/DL (ref 32–36)
MCV RBC AUTO: 92 FL (ref 82–98)
MONOCYTES # BLD AUTO: 0.7 K/UL (ref 0.3–1)
MONOCYTES NFR BLD: 11.9 % (ref 4–15)
NEUTROPHILS # BLD AUTO: 3 K/UL (ref 1.8–7.7)
NEUTROPHILS NFR BLD: 53.1 % (ref 38–73)
NRBC BLD-RTO: 0 /100 WBC
PLATELET # BLD AUTO: 117 K/UL (ref 150–450)
PMV BLD AUTO: 11.4 FL (ref 9.2–12.9)
POTASSIUM SERPL-SCNC: 3.9 MMOL/L (ref 3.5–5.1)
PROT SERPL-MCNC: 7.1 G/DL (ref 6–8.4)
PROTHROMBIN TIME: 12.7 SEC (ref 9–12.5)
RBC # BLD AUTO: 4.17 M/UL (ref 4.6–6.2)
SODIUM SERPL-SCNC: 140 MMOL/L (ref 136–145)
WBC # BLD AUTO: 5.71 K/UL (ref 3.9–12.7)

## 2023-03-28 PROCEDURE — 82105 ALPHA-FETOPROTEIN SERUM: CPT | Mod: TXP | Performed by: INTERNAL MEDICINE

## 2023-03-28 PROCEDURE — 74183 MRI ABD W/O CNTR FLWD CNTR: CPT | Mod: 26,TXP,, | Performed by: STUDENT IN AN ORGANIZED HEALTH CARE EDUCATION/TRAINING PROGRAM

## 2023-03-28 PROCEDURE — 80053 COMPREHEN METABOLIC PANEL: CPT | Mod: TXP | Performed by: INTERNAL MEDICINE

## 2023-03-28 PROCEDURE — 25500020 PHARM REV CODE 255: Mod: TXP | Performed by: INTERNAL MEDICINE

## 2023-03-28 PROCEDURE — 85025 COMPLETE CBC W/AUTO DIFF WBC: CPT | Mod: TXP | Performed by: INTERNAL MEDICINE

## 2023-03-28 PROCEDURE — 85610 PROTHROMBIN TIME: CPT | Mod: TXP | Performed by: INTERNAL MEDICINE

## 2023-03-28 PROCEDURE — 36415 COLL VENOUS BLD VENIPUNCTURE: CPT | Mod: TXP | Performed by: INTERNAL MEDICINE

## 2023-03-28 PROCEDURE — 74183 MRI ABDOMEN W WO CONTRAST: ICD-10-PCS | Mod: 26,TXP,, | Performed by: STUDENT IN AN ORGANIZED HEALTH CARE EDUCATION/TRAINING PROGRAM

## 2023-03-28 PROCEDURE — 74183 MRI ABD W/O CNTR FLWD CNTR: CPT | Mod: TC,TXP

## 2023-03-28 PROCEDURE — A9585 GADOBUTROL INJECTION: HCPCS | Mod: TXP | Performed by: INTERNAL MEDICINE

## 2023-03-28 RX ORDER — GADOBUTROL 604.72 MG/ML
10 INJECTION INTRAVENOUS
Status: COMPLETED | OUTPATIENT
Start: 2023-03-28 | End: 2023-03-28

## 2023-03-28 RX ADMIN — GADOBUTROL 10 ML: 604.72 INJECTION INTRAVENOUS at 11:03

## 2023-03-29 ENCOUNTER — TELEPHONE (OUTPATIENT)
Dept: TRANSPLANT | Facility: CLINIC | Age: 68
End: 2023-03-29
Payer: COMMERCIAL

## 2023-03-30 PROBLEM — I48.91 ATRIAL FIBRILLATION: Status: ACTIVE | Noted: 2023-03-30

## 2023-03-30 PROBLEM — I10 HYPERTENSION: Status: ACTIVE | Noted: 2023-03-30

## 2023-03-30 NOTE — TELEPHONE ENCOUNTER
Patient: Juan Carlos Yoo Sr.       MRN: [id number]      : 1955     Age: 67 y.o.  [address]  [facility] [address]    Presenting Radiologists:     Providers  Hepatologists: Latricia Vaughn MD and Deanna Cota MD  Surgeons:   Radiologists:   Advanced Practice providers:     Priority of review: Cancer    Patient Transplant Status: in evaluation    Reason for presentation: Reassessment    Clinical Summary: 67 year old with small liver lesion    Imaging to be reviewed: MRI abdo 3/23    HCC Treatment History: not yet treated    ABO: A POS    Platelets:   Lab Results   Component Value Date/Time     (L) 2023 12:00 PM     Creatinine:   Lab Results   Component Value Date/Time    CREATININE 1.0 2023 12:00 PM     Bilirubin:   Lab Results   Component Value Date/Time    BILITOT 1.5 (H) 2023 12:00 PM     AFP Last 3 each if available:   Lab Results   Component Value Date/Time    AFP 6.2 2023 12:00 PM    AFP 7.3 (H) 2022 08:10 AM    AFP 6.4 2022 08:35 AM       MELD: MELD-Na score: 10 at 3/28/2023 12:00 PM  MELD score: 10 at 3/28/2023 12:00 PM  Calculated from:  Serum Creatinine: 1.0 mg/dL at 3/28/2023 12:00 PM  Serum Sodium: 140 mmol/L (Using max of 137 mmol/L) at 3/28/2023 12:00 PM  Total Bilirubin: 1.5 mg/dL at 3/28/2023 12:00 PM  INR(ratio): 1.2 at 3/28/2023 12:00 PM  Age: 67 years    Plan:     Follow-up Provider:

## 2023-04-03 ENCOUNTER — TELEPHONE (OUTPATIENT)
Dept: TRANSPLANT | Facility: CLINIC | Age: 68
End: 2023-04-03
Payer: COMMERCIAL

## 2023-04-03 DIAGNOSIS — Z76.82 ORGAN TRANSPLANT CANDIDATE: Primary | ICD-10-CM

## 2023-04-03 NOTE — TELEPHONE ENCOUNTER
Patient: Juan Carlos Yoo Sr.       MRN: [id number]      : 1955     Age: 67 y.o.  [address]  [place] [facility] [address]    Presenting Radiologists: Sherron Gupta MD    Providers  Hepatologists: Latricia Vaughn MD and Deanna Cota MD  Surgeons:   Radiologists:   Advanced Practice providers:     Priority of review: Cancer    Patient Transplant Status: in evaluation    Reason for presentation: Reassessment    Clinical Summary: 67 year old with small liver lesion    Imaging to be reviewed: MRI abdo 3/23    HCC Treatment History: not yet treated    ABO: A POS    Platelets:   Lab Results   Component Value Date/Time     (L) 2023 12:00 PM     Creatinine:   Lab Results   Component Value Date/Time    CREATININE 1.0 2023 12:00 PM     Bilirubin:   Lab Results   Component Value Date/Time    BILITOT 1.5 (H) 2023 12:00 PM     AFP Last 3 each if available:   Lab Results   Component Value Date/Time    AFP 6.2 2023 12:00 PM    AFP 7.3 (H) 2022 08:10 AM    AFP 6.4 2022 08:35 AM       MELD: MELD-Na score: 10 at 3/28/2023 12:00 PM  MELD score: 10 at 3/28/2023 12:00 PM  Calculated from:  Serum Creatinine: 1.0 mg/dL at 3/28/2023 12:00 PM  Serum Sodium: 140 mmol/L (Using max of 137 mmol/L) at 3/28/2023 12:00 PM  Total Bilirubin: 1.5 mg/dL at 3/28/2023 12:00 PM  INR(ratio): 1.2 at 3/28/2023 12:00 PM  Age: 67 years    Plan: 1.4 cm mass with imaging characteristics consistent with HCC with two other arterially enhancing lesions without washout. Previously did not want to treat until after 2 cm, if desire treatment can Y90.       Committee Discussion: Single arterially enhancing lesion demonstrating interval growth, measuring 1.4 cm (1.2 cm), with washout and pseudocapsule. Continue to monitor    Plan: Repeat MRI and AFP in 3 months (2023).     Note forwarded to Brendan Lock RN to schedule    Follow-up Provider: Deanna Cota MD

## 2023-04-03 NOTE — TELEPHONE ENCOUNTER
Patient still needs EGD and Colonoscopy to finish his outpatient liver transplant evaluation. Orders in Epic.  Phone number provided to patient for him to schedule procedure.  
[phone] [phone]

## 2023-04-04 ENCOUNTER — TELEPHONE (OUTPATIENT)
Dept: TRANSPLANT | Facility: CLINIC | Age: 68
End: 2023-04-04
Payer: COMMERCIAL

## 2023-04-04 ENCOUNTER — CONFERENCE (OUTPATIENT)
Dept: TRANSPLANT | Facility: CLINIC | Age: 68
End: 2023-04-04
Payer: COMMERCIAL

## 2023-04-04 NOTE — TELEPHONE ENCOUNTER
Call to pt re radiology review- interval increase in size. Will repeat imaging in 3 months. Unable to leave a message

## 2023-04-14 ENCOUNTER — TELEPHONE (OUTPATIENT)
Dept: TRANSPLANT | Facility: CLINIC | Age: 68
End: 2023-04-14
Payer: COMMERCIAL

## 2023-04-14 DIAGNOSIS — Z76.82 ORGAN TRANSPLANT CANDIDATE: Primary | ICD-10-CM

## 2023-04-14 NOTE — TELEPHONE ENCOUNTER
Called patient for an update.  Patient has not scheduled his EGD and Colonoscopy.  Explained to patient that an Epic order request has been generated and for him to call gastric clinic to schedule. Patient states he will schedule.  Asked patient to get updated MELD  labs this Monday on 4/17/23 at [facility].    Asked patient if he had a second caregiver. Would like to present patient next Wednesday at selection committee. Patient states he has talked to a few people about being a second caregiver and will follow-up with them.

## 2023-04-17 ENCOUNTER — DOCUMENTATION ONLY (OUTPATIENT)
Dept: TRANSPLANT | Facility: CLINIC | Age: 68
End: 2023-04-17
Payer: COMMERCIAL

## 2023-04-17 ENCOUNTER — TELEPHONE (OUTPATIENT)
Dept: TRANSPLANT | Facility: HOSPITAL | Age: 68
End: 2023-04-17
Payer: COMMERCIAL

## 2023-04-17 NOTE — TELEPHONE ENCOUNTER
"Transplant SW contacted patient for follow up and continuity of care. SW inquired about patient's secondary caregiver plan, as patient had not identified secondary caregivers upon last SW visit. Patient reports he is "still checking on it" and needs more time. Patient reports he has thought of some family members but needs to speak with them prior to listing anyone and SW confirmation.     SW also inquired if patient has engaged in any AA or IOP as recommended. Patient has had several positive PETH tests since October 2022.     Component Ref Range & Units 1 mo ago 3 mo ago 4 mo ago  12/13/22 6 mo ago  10/17/22   PEth 16:0/18.1 (POPEth) Cutoff: 10 ng/mL 12  49 CM  19 CM  60 CM      Patient reports he has not engaged in AA/IOP due to being "kind of busy" and partially working. SW discussed patient's positive PETH results. Patient notes drinking over the "holidays" but ambivalent about last use and amount. SW advised patient to get started on AA and look at IOP resources provided during last visit due to patient having several positive PETH results. SW encouraged patient to let team know when he has done any meetings and send team confirmation. Patient verbalizes understanding and denies any questions at this time.     SW recommends patient have Psychiatry consult due to unclear history of alcohol use and ambivalence about use/treatment. Patient is recommended to attend AA/IOP and have ongoing PETH results to show negative prior to being considered for listing.     SW remains available as needed.   "

## 2023-04-19 ENCOUNTER — COMMITTEE REVIEW (OUTPATIENT)
Dept: TRANSPLANT | Facility: CLINIC | Age: 68
End: 2023-04-19
Payer: COMMERCIAL

## 2023-04-19 NOTE — COMMITTEE REVIEW
Juan Carlos Yoo's case presented to selection committee.  Patient has been declined for liver transplant due to positive PEth test, and increased BMI.  Recommendation made to treat liver lesion.  Will need to discuss patient in IR conference next week.  I was present at the committee meeting and attest to the decision of the committee.    Boy Collado  04/21/2023

## 2023-04-21 ENCOUNTER — TELEPHONE (OUTPATIENT)
Dept: TRANSPLANT | Facility: CLINIC | Age: 68
End: 2023-04-21
Payer: COMMERCIAL

## 2023-04-21 NOTE — TELEPHONE ENCOUNTER
Patient: Juan Carlos Yoo Sr.       MRN: [id number]      : 1955     Age: 67 y.o.  [address]  [facility] [address]    Presenting Radiologists:  ..    Providers  Hepatologists: Deanna Cota MD  Surgeons: Derik Cesar MD    Priority of review: Cancer    Patient Transplant Status:  Other declined due to age 67, AFib, BMI 37.7, central obesity, s/p lap grant, positive PETH tests  except yesterday's was negative  Reason for presentation: Other locoregional treatment     Clinical Summary: 66 y/o male with alcoholic cirrhosis, ascites edema controlled. 1.3 cm lesion seen on imaging, grew a little 1.4 cm over 3 months while noble was going on.  Now he is declined for txp, so need to treat lesion with IR procedures.     Imaging to be reviewed: MRI 3/28/23, 22.    HCC Treatment History: none yet    Platelets:   Lab Results   Component Value Date/Time     (L) 2023 03:38 PM     Creatinine:   Lab Results   Component Value Date/Time    CREATININE 1.1 2023 03:38 PM     Bilirubin:   Lab Results   Component Value Date/Time    BILITOT 2.2 (H) 2023 03:38 PM     AFP Last 3 each if available:   Lab Results   Component Value Date/Time    AFP 6.2 2023 12:00 PM    AFP 7.3 (H) 2022 08:10 AM    AFP 6.4 2022 08:35 AM       MELD: MELD-Na score: 13 at 2023  3:38 PM  MELD score: 13 at 2023  3:38 PM  Calculated from:  Serum Creatinine: 1.1 mg/dL at 2023  3:38 PM  Serum Sodium: 137 mmol/L at 2023  3:38 PM  Total Bilirubin: 2.2 mg/dL at 2023  3:38 PM  INR(ratio): 1.3 at 2023  3:38 PM  Age: 67 years    Plan:     Follow-up Provider:

## 2023-04-21 NOTE — TELEPHONE ENCOUNTER
I called Mr. Yoo and explained that he is declined for transplant due to central obesity and positive PETH tests. Informed him that IR will discuss his lesion and how to treat his lesion. Patent would like Dr. Cota to call him. He has questions about his liver lesion and next steps. Message sent to Dr. Cota

## 2023-04-23 ENCOUNTER — TELEPHONE (OUTPATIENT)
Dept: TRANSPLANT | Facility: CLINIC | Age: 68
End: 2023-04-23
Payer: COMMERCIAL

## 2023-04-23 NOTE — TELEPHONE ENCOUNTER
I have informed the patient that his transplant candidacy was declined by the transplant committee last Wednesday 4/19/23 due to high risk from central obesity, BMI 38, chronic A Fib, multiple positive PETH tests. I have also told him, that the committee recommendation was to present his case to IR conf next week to see if he is a candidate for locoregional treatment of liver lesion, if it is c/w HCC.  Patient showed understanding.      Deanna Cota MD

## 2023-04-29 ENCOUNTER — TELEPHONE (OUTPATIENT)
Dept: TRANSPLANT | Facility: CLINIC | Age: 68
End: 2023-04-29
Payer: COMMERCIAL

## 2023-04-29 DIAGNOSIS — I48.19 PERSISTENT ATRIAL FIBRILLATION: ICD-10-CM

## 2023-04-29 DIAGNOSIS — Z79.01 ON CONTINUOUS ORAL ANTICOAGULATION: ICD-10-CM

## 2023-04-29 DIAGNOSIS — C22.0 HCC (HEPATOCELLULAR CARCINOMA): Primary | ICD-10-CM

## 2023-04-29 DIAGNOSIS — K70.31 ALCOHOLIC CIRRHOSIS OF LIVER WITH ASCITES: ICD-10-CM

## 2023-04-29 NOTE — TELEPHONE ENCOUNTER
Mr. Yoo's imaging was reviewed in IR-Liver Conf on 4/25/23. Findings on most recent cross-sectional imaging, MRI 3/28/23, was growth of a lesion in the liver.Below is the assessment of the findings and recommendation from IR:  Discussion/Plan: 2cm tumor in seg 8 with arterial enhancement and PV washout, c/w HCC.  Albumin is 3.2.  Recommend Y90 for locoregional therapy.     I called Mr. Yoo and explained the above to him, including basics of Y-90 treatment.  Answered his questions.     Placing order for IR consult, please give appointment.    Deanna Cota MD

## 2023-05-08 ENCOUNTER — OFFICE VISIT (OUTPATIENT)
Dept: INTERVENTIONAL RADIOLOGY/VASCULAR | Facility: CLINIC | Age: 68
End: 2023-05-08
Payer: COMMERCIAL

## 2023-05-08 VITALS
HEIGHT: 73 IN | DIASTOLIC BLOOD PRESSURE: 94 MMHG | BODY MASS INDEX: 36.89 KG/M2 | HEART RATE: 87 BPM | SYSTOLIC BLOOD PRESSURE: 154 MMHG | WEIGHT: 278.31 LBS

## 2023-05-08 DIAGNOSIS — C22.0 HCC (HEPATOCELLULAR CARCINOMA): Primary | ICD-10-CM

## 2023-05-08 PROCEDURE — 3077F PR MOST RECENT SYSTOLIC BLOOD PRESSURE >= 140 MM HG: ICD-10-PCS | Mod: CPTII,S$GLB,,

## 2023-05-08 PROCEDURE — 3077F SYST BP >= 140 MM HG: CPT | Mod: CPTII,S$GLB,,

## 2023-05-08 PROCEDURE — 1159F PR MEDICATION LIST DOCUMENTED IN MEDICAL RECORD: ICD-10-PCS | Mod: CPTII,S$GLB,,

## 2023-05-08 PROCEDURE — 99214 PR OFFICE/OUTPT VISIT, EST, LEVL IV, 30-39 MIN: ICD-10-PCS | Mod: S$GLB,,,

## 2023-05-08 PROCEDURE — 3008F PR BODY MASS INDEX (BMI) DOCUMENTED: ICD-10-PCS | Mod: CPTII,S$GLB,,

## 2023-05-08 PROCEDURE — 99999 PR PBB SHADOW E&M-EST. PATIENT-LVL V: CPT | Mod: PBBFAC,,,

## 2023-05-08 PROCEDURE — 3008F BODY MASS INDEX DOCD: CPT | Mod: CPTII,S$GLB,,

## 2023-05-08 PROCEDURE — 3080F DIAST BP >= 90 MM HG: CPT | Mod: CPTII,S$GLB,,

## 2023-05-08 PROCEDURE — 1160F RVW MEDS BY RX/DR IN RCRD: CPT | Mod: CPTII,S$GLB,,

## 2023-05-08 PROCEDURE — 3080F PR MOST RECENT DIASTOLIC BLOOD PRESSURE >= 90 MM HG: ICD-10-PCS | Mod: CPTII,S$GLB,,

## 2023-05-08 PROCEDURE — 1159F MED LIST DOCD IN RCRD: CPT | Mod: CPTII,S$GLB,,

## 2023-05-08 PROCEDURE — 99999 PR PBB SHADOW E&M-EST. PATIENT-LVL V: ICD-10-PCS | Mod: PBBFAC,,,

## 2023-05-08 PROCEDURE — 1160F PR REVIEW ALL MEDS BY PRESCRIBER/CLIN PHARMACIST DOCUMENTED: ICD-10-PCS | Mod: CPTII,S$GLB,,

## 2023-05-08 PROCEDURE — 99214 OFFICE O/P EST MOD 30 MIN: CPT | Mod: S$GLB,,,

## 2023-05-08 NOTE — PROGRESS NOTES
Subjective     Patient ID: Juan Carlos Yoo Sr. is a 67 y.o. male.    Chief Complaint: HCC    Referral from Dr. Cota to discuss y90.  Patient was discussed in a multidisciplinary liver conference (including surgery, oncology, hepatology, and IR). This is a 67 y.o. male here decomp cirrhosis, hepatic encephalopathy, thrombocytopenia, eso varices wo bleeding, and Afib presents to discuss y90. Endorses some fatigue and MACK (chronic). No other complaints today. Would like to proceed with scheduling has some up coming trips coming up.       PMH and Medications reviewed.   Dr. Cota and conference notes reviewed.       Review of Systems   Constitutional:  Positive for fatigue. Negative for activity change, appetite change, fever and unexpected weight change (lost a couple pounds but has been eating better).   Respiratory:  Positive for shortness of breath (MACK). Negative for cough.    Cardiovascular:  Negative for chest pain and leg swelling.   Gastrointestinal:  Positive for abdominal distention. Negative for abdominal pain.   Neurological:  Positive for numbness (Chronic (Right side)). Negative for dizziness, speech difficulty, coordination difficulties and coordination difficulties.   Psychiatric/Behavioral:  Negative for behavioral problems and confusion.         Objective     Physical Exam  Constitutional:       General: He is not in acute distress.     Appearance: Normal appearance. He is obese. He is not toxic-appearing or diaphoretic.   HENT:      Head: Normocephalic and atraumatic.      Right Ear: External ear normal.      Left Ear: External ear normal.      Nose: Nose normal.   Eyes:      General: Lids are normal.      Conjunctiva/sclera: Conjunctivae normal.   Cardiovascular:      Rate and Rhythm: Normal rate and regular rhythm.   Pulmonary:      Effort: Pulmonary effort is normal. No accessory muscle usage or respiratory distress.      Breath sounds: Normal breath sounds.   Abdominal:      General: Abdomen is  flat. Bowel sounds are normal. There is no distension.      Palpations: Abdomen is soft. There is no mass.      Tenderness: There is no abdominal tenderness.   Musculoskeletal:         General: Normal range of motion.      Right lower leg: No edema.      Left lower leg: No edema.   Skin:     General: Skin is warm and dry.      Coloration: Skin is not jaundiced.   Neurological:      General: No focal deficit present.      Mental Status: He is alert and oriented to person, place, and time.      Cranial Nerves: No facial asymmetry.   Psychiatric:         Attention and Perception: Attention normal.         Mood and Affect: Mood normal.         Speech: Speech normal.         Behavior: Behavior normal.       Impression:     1. Mildly increased size of an arterial enhancing right hepatic lobe lesion with suspected mild washout and pseudo capsule concerning for HCC.  Two additional indeterminate subcentimeter foci of arterial enhancement within the right lobe.  2. Cirrhotic liver.  3. Mild splenomegaly.  4. Trace ascites.  5. Additional findings as above.  This report was flagged in Epic as abnormal     Assessment and Plan     HCC (hepatocellular carcinoma)  -     IR Embolization for Tumor_Organ Ischemia; Future; Expected date: 05/08/2023  -     IR Embolization for Tumor_Organ Ischemia; Future; Expected date: 05/08/2023  -     NM Liver Imaging Static POST Y-90 Emboli; Future; Expected date: 05/08/2023  -     NM Liver Imaging Static PRE Y-90 Emboliz; Future; Expected date: 05/08/2023  -     NM Spect/CT Single Area; Future; Expected date: 05/08/2023  -     NM Spect/CT Single Area; Future; Expected date: 05/08/2023  -     Bilirubin, Direct; Future; Expected date: 05/08/2023  -     CBC Auto Differential; Future; Expected date: 05/08/2023  -     Comprehensive Metabolic Panel; Future; Expected date: 05/08/2023  -     Protime-INR; Future; Expected date: 05/08/2023      - Not a surgical or transplant candidate at this time. ECOG-  0   - Lesion remeasured in coronal phase and measures 2 cm  - 2 cm tumor in seg 8 with arterial enhancement and PV washout, c/w HCC. Albumin is 3.2.  Recommend Y90 for locoregional therapy.   -  Patient was reviewed in multidisciplinary liver conference including IR, hepatology, surgery, and oncology.  - Based on liver function and functional status patient has early stage disease. Recommend Y90 for locoregional therapy as a definitive therapy or potential bridge to liver transplant.   - Patient with untreated HCC at [facility] for progression without intervention    Yttrium 90 Radioembolization discussed in detail with the patient including risks (including, but not limited to, pain, bleeding, infection, damage to nearby structures, and the need for additional procedures), benefits, potential complications, usual pre and post procedure course.  Discussed the need for initial Angiogram mapping study prior to scheduling the actual Radioembolization procedure. Utilized images from the patient's chart, the internet, and illustrations to help explain procedure. The patient voices understanding and all questions have been answered.  The patient agrees to proceed as planned. Clinic phone number provided.    - Patient would prefer  location. He has a couple of trips planned and concerned regarding scheduling. Discussed with .  went into to room to discuss dates.     Lila Floyd PA-C  Interventional Radiology  [phone]

## 2023-05-15 ENCOUNTER — LAB VISIT (OUTPATIENT)
Dept: LAB | Facility: HOSPITAL | Age: 68
End: 2023-05-15
Attending: INTERNAL MEDICINE
Payer: COMMERCIAL

## 2023-05-15 ENCOUNTER — OFFICE VISIT (OUTPATIENT)
Dept: TRANSPLANT | Facility: CLINIC | Age: 68
End: 2023-05-15
Payer: COMMERCIAL

## 2023-05-15 VITALS
WEIGHT: 277.13 LBS | HEART RATE: 94 BPM | OXYGEN SATURATION: 98 % | BODY MASS INDEX: 36.73 KG/M2 | RESPIRATION RATE: 17 BRPM | DIASTOLIC BLOOD PRESSURE: 82 MMHG | TEMPERATURE: 97 F | HEIGHT: 73 IN | SYSTOLIC BLOOD PRESSURE: 164 MMHG

## 2023-05-15 DIAGNOSIS — Z76.82 ORGAN TRANSPLANT CANDIDATE: ICD-10-CM

## 2023-05-15 DIAGNOSIS — K70.30 ALCOHOLIC CIRRHOSIS OF LIVER WITHOUT ASCITES: Primary | ICD-10-CM

## 2023-05-15 LAB
ALBUMIN SERPL BCP-MCNC: 3.2 G/DL (ref 3.5–5.2)
ALP SERPL-CCNC: 53 U/L (ref 55–135)
ALT SERPL W/O P-5'-P-CCNC: 10 U/L (ref 10–44)
AMPHET+METHAMPHET UR QL: NEGATIVE
ANION GAP SERPL CALC-SCNC: 8 MMOL/L (ref 8–16)
AST SERPL-CCNC: 19 U/L (ref 10–40)
BARBITURATES UR QL SCN>200 NG/ML: NEGATIVE
BASOPHILS # BLD AUTO: 0.06 K/UL (ref 0–0.2)
BASOPHILS NFR BLD: 0.8 % (ref 0–1.9)
BENZODIAZ UR QL SCN>200 NG/ML: NEGATIVE
BILIRUB SERPL-MCNC: 1.9 MG/DL (ref 0.1–1)
BUN SERPL-MCNC: 18 MG/DL (ref 8–23)
BZE UR QL SCN: NEGATIVE
CALCIUM SERPL-MCNC: 9.3 MG/DL (ref 8.7–10.5)
CANNABINOIDS UR QL SCN: NEGATIVE
CHLORIDE SERPL-SCNC: 99 MMOL/L (ref 95–110)
CO2 SERPL-SCNC: 33 MMOL/L (ref 23–29)
CREAT SERPL-MCNC: 1.4 MG/DL (ref 0.5–1.4)
CREAT UR-MCNC: 53 MG/DL (ref 23–375)
DIFFERENTIAL METHOD: ABNORMAL
EOSINOPHIL # BLD AUTO: 0.2 K/UL (ref 0–0.5)
EOSINOPHIL NFR BLD: 2.4 % (ref 0–8)
ERYTHROCYTE [DISTWIDTH] IN BLOOD BY AUTOMATED COUNT: 13.7 % (ref 11.5–14.5)
EST. GFR  (NO RACE VARIABLE): 54.4 ML/MIN/1.73 M^2
ETHANOL UR-MCNC: <10 MG/DL
GLUCOSE SERPL-MCNC: 105 MG/DL (ref 70–110)
HCT VFR BLD AUTO: 42 % (ref 40–54)
HGB BLD-MCNC: 14.7 G/DL (ref 14–18)
IMM GRANULOCYTES # BLD AUTO: 0.03 K/UL (ref 0–0.04)
IMM GRANULOCYTES NFR BLD AUTO: 0.4 % (ref 0–0.5)
INR PPP: 1.3 (ref 0.8–1.2)
LYMPHOCYTES # BLD AUTO: 1.7 K/UL (ref 1–4.8)
LYMPHOCYTES NFR BLD: 22.5 % (ref 18–48)
MCH RBC QN AUTO: 31.2 PG (ref 27–31)
MCHC RBC AUTO-ENTMCNC: 35 G/DL (ref 32–36)
MCV RBC AUTO: 89 FL (ref 82–98)
METHADONE UR QL SCN>300 NG/ML: NEGATIVE
MONOCYTES # BLD AUTO: 0.9 K/UL (ref 0.3–1)
MONOCYTES NFR BLD: 11.4 % (ref 4–15)
NEUTROPHILS # BLD AUTO: 4.8 K/UL (ref 1.8–7.7)
NEUTROPHILS NFR BLD: 62.5 % (ref 38–73)
NRBC BLD-RTO: 0 /100 WBC
OPIATES UR QL SCN: NEGATIVE
PCP UR QL SCN>25 NG/ML: NEGATIVE
PLATELET # BLD AUTO: ABNORMAL K/UL (ref 150–450)
PLATELET BLD QL SMEAR: ABNORMAL
PMV BLD AUTO: ABNORMAL FL (ref 9.2–12.9)
POTASSIUM SERPL-SCNC: 3.5 MMOL/L (ref 3.5–5.1)
PROT SERPL-MCNC: 7.3 G/DL (ref 6–8.4)
PROTHROMBIN TIME: 14.1 SEC (ref 9–12.5)
RBC # BLD AUTO: 4.71 M/UL (ref 4.6–6.2)
SODIUM SERPL-SCNC: 140 MMOL/L (ref 136–145)
TOXICOLOGY INFORMATION: NORMAL
WBC # BLD AUTO: 7.6 K/UL (ref 3.9–12.7)

## 2023-05-15 PROCEDURE — 85610 PROTHROMBIN TIME: CPT | Performed by: INTERNAL MEDICINE

## 2023-05-15 PROCEDURE — 80307 DRUG TEST PRSMV CHEM ANLYZR: CPT | Performed by: INTERNAL MEDICINE

## 2023-05-15 PROCEDURE — 99999 PR PBB SHADOW E&M-EST. PATIENT-LVL IV: CPT | Mod: PBBFAC,,, | Performed by: INTERNAL MEDICINE

## 2023-05-15 PROCEDURE — 3077F PR MOST RECENT SYSTOLIC BLOOD PRESSURE >= 140 MM HG: ICD-10-PCS | Mod: CPTII,S$GLB,, | Performed by: INTERNAL MEDICINE

## 2023-05-15 PROCEDURE — 80053 COMPREHEN METABOLIC PANEL: CPT | Performed by: INTERNAL MEDICINE

## 2023-05-15 PROCEDURE — 1125F AMNT PAIN NOTED PAIN PRSNT: CPT | Mod: CPTII,S$GLB,, | Performed by: INTERNAL MEDICINE

## 2023-05-15 PROCEDURE — 1101F PR PT FALLS ASSESS DOC 0-1 FALLS W/OUT INJ PAST YR: ICD-10-PCS | Mod: CPTII,S$GLB,, | Performed by: INTERNAL MEDICINE

## 2023-05-15 PROCEDURE — 1125F PR PAIN SEVERITY QUANTIFIED, PAIN PRESENT: ICD-10-PCS | Mod: CPTII,S$GLB,, | Performed by: INTERNAL MEDICINE

## 2023-05-15 PROCEDURE — 36415 COLL VENOUS BLD VENIPUNCTURE: CPT | Performed by: INTERNAL MEDICINE

## 2023-05-15 PROCEDURE — 99213 OFFICE O/P EST LOW 20 MIN: CPT | Mod: S$GLB,,, | Performed by: INTERNAL MEDICINE

## 2023-05-15 PROCEDURE — 1101F PT FALLS ASSESS-DOCD LE1/YR: CPT | Mod: CPTII,S$GLB,, | Performed by: INTERNAL MEDICINE

## 2023-05-15 PROCEDURE — 3288F FALL RISK ASSESSMENT DOCD: CPT | Mod: CPTII,S$GLB,, | Performed by: INTERNAL MEDICINE

## 2023-05-15 PROCEDURE — 3288F PR FALLS RISK ASSESSMENT DOCUMENTED: ICD-10-PCS | Mod: CPTII,S$GLB,, | Performed by: INTERNAL MEDICINE

## 2023-05-15 PROCEDURE — 3079F PR MOST RECENT DIASTOLIC BLOOD PRESSURE 80-89 MM HG: ICD-10-PCS | Mod: CPTII,S$GLB,, | Performed by: INTERNAL MEDICINE

## 2023-05-15 PROCEDURE — 99999 PR PBB SHADOW E&M-EST. PATIENT-LVL IV: ICD-10-PCS | Mod: PBBFAC,,, | Performed by: INTERNAL MEDICINE

## 2023-05-15 PROCEDURE — 80321 ALCOHOLS BIOMARKERS 1OR 2: CPT | Performed by: INTERNAL MEDICINE

## 2023-05-15 PROCEDURE — 3079F DIAST BP 80-89 MM HG: CPT | Mod: CPTII,S$GLB,, | Performed by: INTERNAL MEDICINE

## 2023-05-15 PROCEDURE — 85025 COMPLETE CBC W/AUTO DIFF WBC: CPT | Performed by: INTERNAL MEDICINE

## 2023-05-15 PROCEDURE — 3008F PR BODY MASS INDEX (BMI) DOCUMENTED: ICD-10-PCS | Mod: CPTII,S$GLB,, | Performed by: INTERNAL MEDICINE

## 2023-05-15 PROCEDURE — 1159F MED LIST DOCD IN RCRD: CPT | Mod: CPTII,S$GLB,, | Performed by: INTERNAL MEDICINE

## 2023-05-15 PROCEDURE — 3077F SYST BP >= 140 MM HG: CPT | Mod: CPTII,S$GLB,, | Performed by: INTERNAL MEDICINE

## 2023-05-15 PROCEDURE — 3008F BODY MASS INDEX DOCD: CPT | Mod: CPTII,S$GLB,, | Performed by: INTERNAL MEDICINE

## 2023-05-15 PROCEDURE — 1159F PR MEDICATION LIST DOCUMENTED IN MEDICAL RECORD: ICD-10-PCS | Mod: CPTII,S$GLB,, | Performed by: INTERNAL MEDICINE

## 2023-05-15 PROCEDURE — 99213 PR OFFICE/OUTPT VISIT, EST, LEVL III, 20-29 MIN: ICD-10-PCS | Mod: S$GLB,,, | Performed by: INTERNAL MEDICINE

## 2023-05-15 NOTE — Clinical Note
Recommendations: -  Labs every 3 months:  CBC, CMP, PT INR -  Next imaging will be ordered by IR after treatment of the liver lesion. -  Cardiology appt (heart failure) for pulm hypertension.  -  Low salt in the diet, avoid canned, bottled and processed foods. -  Continue current meds -  Avoid alcohol, smoking, sedatives and meds with codeine. -  Use sliding scale for lactulose as follows:  Take 1 cup 30 mL at 8AM.  If no stool by 12 noon, take 30 mL more of lactulose.  If <2 stools by 3 PM, take 30 mL more of lactulose.  Goal is 3-4 soft stools daily.  -  Avoid high intake of Tylenol (more than 4 extra-strength pills in one day) -  Call us if any bleeding, fevers, confusion, disorientation occur -  Endoscopies: to be done here in GI.  -  Return in approx 4 months (3 months after Y-90).

## 2023-05-15 NOTE — PROGRESS NOTES
"   [facility] Follow-up Note    Reason for Consult:  The encounter diagnosis was Organ transplant candidate.    PCP: Primary Doctor No       HPI:  This is a 67 y.o. male here for follow-up of: cirrhosis.      Patient completed transplant eval and was presented to selection committee, however, he was declined due to persistent positive PETH tests, high BMI. Committee recommended having liver lesion treated.  Has appointment with IR on June 6, 2023 to have mapping for the lesion to be treated with Y-90.       Today, Mr. Yoo states he has lost 5 lbs.  Balance is "not so good".      Decomp cirrhosis with fatigue, hepatic encephalopathy, thrombocytopenia, eso varices wo bleeding.  EGD and colonoscopy were supposed to be done 2 months ago, he was prepped, and anesthesia did not want to go forward, he was sent to ECHO: A Fib cardioverted 3 times did not stop the arrhythmia, but in the ER he got a shot, and heart converted to sinus rhythm, no more A Fib since.     ECHO 6/15/22:   The left ventricle is normal in size with normal systolic function.  The estimated ejection fraction is 55-60%.  The quantitatively derived ejection fraction is 56%.  A diastolic pattern consistent with atrial fibrillation observed.  Severe left atrial enlargement.  Mild right ventricular enlargement with normal right ventricular systolic function.  Severe right atrial enlargement.  Mild aortic regurgitation.  Mild mitral regurgitation.  The estimated PA systolic pressure is 53 mmHg.  There is moderate pulmonary hypertension.  Mild tricuspid regurgitation.  Intermediate central venous pressure (8 mmHg).     Repeat ECHO normal.   Cardiac cath also minimal vasc abnormality, no signific coronary obstruction.     Liver lesion 1.3 cm, indeterminate, possible HCC.     OCTAVIA Miranda note 10/6/22:  He has mentioned drinking heavily several weeks before the blood work due to rectal pressure, but he needs to be followed by transplant at this " point due to the severity of his liver disease.     MELD was 23 on 9/29/22.  17 today.      MELD-Na: 15 at 5/15/2023  8:44 AM  MELD: 15 at 5/15/2023  8:44 AM  Calculated from:  Serum Creatinine: 1.4 mg/dL at 5/15/2023  8:44 AM  Serum Sodium: 140 mmol/L (Using max of 137 mmol/L) at 5/15/2023  8:44 AM  Total Bilirubin: 1.9 mg/dL at 5/15/2023  8:44 AM  INR(ratio): 1.3 at 5/15/2023  8:44 AM     US abd 7/1/22:  The liver is normal in size measuring 14.2 cm in length. The liver is heterogeneous in echogenicity and nodular in contour compatible with cirrhosis.  No focal hepatic lesions are identified.  The main portal vein appears patent with antegrade flow.  No intrahepatic biliary ductal dilatation is detected. The common bile duct is within normal limits at 0.4 cm.  The gallbladder is absent.  The right kidney is normal in size measuring 12.2 cm with no evidence of hydronephrosis.  A possible 0.7 cm nonobstructing right renal calculus is noted in the superior pole.  A 0.9 cm cyst is noted in the superior pole of the right kidney.  The spleen is enlarged measuring 13.1 x 5.5 cm.     Impression:   1. Heterogeneous nodular liver compatible with cirrhosis.  2. Cholecystectomy.  3. Splenomegaly.  4. Possible 0.7 cm nonobstructing right renal calculus.  5. Right renal cyst.      Elevated liver enzymes: Yes  Abnormal imaging: Yes  Cirrhosis: Yes  Hepatitis C: No  Hepatitis B: No  Fatty liver: No  Encephalopathy: Yes  Post-hospital discharge: No  Symptoms: Mild swelling in the legs.      Primary hepatic manifestations:  Fatigue:Yes  Edema:Yes  Ascites:Yes - para x 2 in the last year.  Encephalopathy:Yes  Abdominal pain:No  GI bleeds: No  Pruritus:No  Weight Changes:No  Changes in Bowel habits: No  Muscle cramps:No    Risk factors for liver disease:  No jaundice  No transfusions  No IVDU  Did not snort cocaine or similar agents  Did not live with anyone with hepatitis B or C  Sexual partner not tested  No hepatotoxic  medications  No exposure to industrial toxins  Alcohol:       ROS:  Constitutional: No fevers, chills, weight changes, fatigue  ENT: No allergies, nosebleeds,   CV: No chest pain  Pulm: No cough, shortness of breath  Ophtho: No vision changes  GI/Liver: see HPI  Derm: No rash, itching  Heme: No swollen glands, bruising  MSK: No joint pains, joint swelling  : No dysuria, hematuria, decrease in urine output  Endo: No hot or cold intolerance  Neuro: No confusion, disorientation, difficulty with sleep, memory, concentration, syncope, seizure  Psych: No anxiety, depression    Medical History:  has a past medical history of Atrial fibrillation, Bulging disc, Cirrhosis, Colon polyp (12/29/2017), EV (esophageal varices), Hypertension (3/30/2023), Skin cancer (03/2017), and Thrombocytopenia.    Surgical History:  has a past surgical history that includes Cholecystectomy; Tonsillectomy; Vasectomy; Skin biopsy (03/2017); Hernia repair; Upper gastrointestinal endoscopy (2011); Upper gastrointestinal endoscopy (2016); Colonoscopy; Colonoscopy (N/A, 12/29/2017); Catheterization of both left and right heart (N/A, 2/8/2023); and Coronary angiography (N/A, 2/8/2023).    Family History: family history includes Cancer in his maternal uncle; Heart disease in his maternal uncle; Hyperlipidemia in his brother; Ovarian cancer in his sister..     Social History:  reports that he has never smoked. He has never used smokeless tobacco. He reports that he does not currently use alcohol. He reports that he does not use drugs.    Review of patient's allergies indicates:  No Known Allergies    Current Outpatient Rx   Medication Sig Dispense Refill    amLODIPine (NORVASC) 10 MG tablet Take 10 mg by mouth once daily.      apixaban (ELIQUIS) 5 mg Tab Take 1 tablet (5 mg total) by mouth 2 (two) times daily. 60 tablet 11    ascorbic acid, vitamin C, (VITAMIN C) 500 MG tablet Take 500 mg by mouth once daily.      cyanocobalamin (VITAMIN B-12) 100 MCG  "tablet Take 100 mcg by mouth once daily.      fish oil-omega-3 fatty acids 300-1,000 mg capsule Take 1 g by mouth 2 (two) times daily.      furosemide (LASIX) 40 MG tablet Take 1 tablet (40 mg total) by mouth 2 (two) times a day. 90 tablet 3    nadoloL (CORGARD) 20 MG tablet Take 2 tablets (40 mg total) by mouth every evening. 90 tablet 2    pantoprazole (PROTONIX) 40 MG tablet Take 1 tablet (40 mg total) by mouth once daily. 90 tablet 3    spironolactone (ALDACTONE) 50 MG tablet Take 1 tablet (50 mg total) by mouth once daily. 90 tablet 3    tamsulosin (FLOMAX) 0.4 mg Cap Take 1 capsule (0.4 mg total) by mouth every evening. 30 capsule 6       Objective Findings:    Vital Signs:  BP (!) 164/82 (BP Location: Right arm, Patient Position: Sitting, BP Method: Medium (Automatic))   Pulse 94   Temp 97.3 °F (36.3 °C) (Temporal)   Resp 17   Ht 6' 1" (1.854 m)   Wt 125.7 kg (277 lb 1.9 oz)   SpO2 98%   BMI 36.56 kg/m²   Body mass index is 36.56 kg/m².    Physical Exam:  General Appearance: Well appearing in no acute distress  Head:   Normocephalic, without obvious abnormality  Eyes:    No scleral icterus, EOMI  ENT: Neck supple, Lips, mucosa, and tongue normal; teeth and gums normal  Lungs: CTA bilaterally in anterior and posterior fields, no wheezes, no crackles.  Heart:  Regular rate and rhythm, S1, S2 normal, no murmurs heard  Abdomen: Soft, non tender, non distended with positive bowel sounds in all four quadrants. No hepatosplenomegaly, ascites, or mass  Extremities: 2+ pulses, no clubbing, cyanosis or edema  Skin: No rash  Neurologic: CN II-XII intact, alert, oriented x 3. No asterixis      Labs:  Lab Results   Component Value Date    WBC 7.60 05/15/2023    HGB 14.7 05/15/2023    HCT 42.0 05/15/2023     (L) 04/17/2023    CHOL 178 04/01/2022    TRIG 73 04/01/2022    HDL 22 (L) 04/01/2022    INR 1.3 (H) 05/15/2023    CREATININE 1.4 05/15/2023    BUN 18 05/15/2023    BILITOT 1.9 (H) 05/15/2023    ALT 10 " 05/15/2023    AST 19 05/15/2023    ALKPHOS 53 (L) 05/15/2023     05/15/2023    K 3.5 05/15/2023    CL 99 05/15/2023    CO2 33 (H) 05/15/2023    TSH 2.208 12/13/2022    PSA 0.18 09/29/2022    HGBA1C 4.5 12/13/2022    AFP 6.2 03/28/2023     MELD-Na: 15 at 5/15/2023  8:44 AM  MELD: 15 at 5/15/2023  8:44 AM  Calculated from:  Serum Creatinine: 1.4 mg/dL at 5/15/2023  8:44 AM  Serum Sodium: 140 mmol/L (Using max of 137 mmol/L) at 5/15/2023  8:44 AM  Total Bilirubin: 1.9 mg/dL at 5/15/2023  8:44 AM  INR(ratio): 1.3 at 5/15/2023  8:44 AM     Imaging:       Endoscopy:      Assessment:  1. Organ transplant candidate      Decomp. Alcohol related cirrhosis, with HE, ascites (controlled), edema (better now), mild icterus, moderate Pulm Hypertension,  improving with MELD 15 today 5/15/23  AFP yee.l. Creat normal.   First ECHO with pulm HTN due to fluid overload, but after diuresis PASP was normal.   Liver imaging positive for: Liver lesion 1.3 cm, indeterminate, likely HCC.     Recommendations:  -  Proceed with Y-90 treatment of liver lesion.  -  Labs every 3 months:  CBC, CMP, PT INR  -  Next imaging will be ordered by IR after treatment of the liver lesion.  -  Cardiology appt (heart failure) for pulm hypertension.   -  Low salt in the diet, avoid canned, bottled and processed foods.  -  Continue current meds  -  Avoid alcohol, smoking, sedatives and meds with codeine.  -  Use sliding scale for lactulose as follows:  Take 1 cup 30 mL at 8AM.  If no stool by 12 noon, take 30 mL more of lactulose.  If <2 stools by 3 PM, take 30 mL more of lactulose.  Goal is 3-4 soft stools daily.   -  Avoid high intake of Tylenol (more than 4 extra-strength pills in one day)  -  Call us if any bleeding, fevers, confusion, disorientation occur  -  Endoscopies: to be done here in GI.   -  Return in approx 4 months (3 months after Y-90).         No follow-ups on file.      Order summary:  No orders of the defined types were placed in this  encounter.        Thank you so much for allowing me to participate in the care of Juan Carlos Cota MD

## 2023-05-15 NOTE — LETTER
May 15, 2023        Aruna Miranda  [address]  [place] [address]  Phone: [phone]  Fax: [phone]             [facility]  [address]  [facility] [address]  Phone: [phone]   Patient: Juan Carlos Yoo Sr.   MR Number: [id number]   YOB: 1955   Date of Visit: 5/15/2023       Dear Dr. Aruna Miranda    Thank you for referring Juan Carlos Yoo to me for evaluation. Attached you will find relevant portions of my assessment and plan of care.    If you have questions, please do not hesitate to call me. I look forward to following Juan Carlos Yoo along with you.    Sincerely,    Deanna Cota MD    Enclosure    If you would like to receive this communication electronically, please contact [email or web address] or [phone] to request [employer] access.    [employer] is a tool which provides read-only access to select patient information with whom you have a relationship. Its easy to use and provides real time access to review your patients record including encounter summaries, notes, results, and demographic information.    If you feel you have received this communication in error or would no longer like to receive these types of communications, please e-mail [email or web address]

## 2023-05-17 LAB
CLINICAL BIOCHEMIST REVIEW: NORMAL
PLPETH BLD-MCNC: NORMAL NG/ML
POPETH BLD-MCNC: <10 NG/ML

## 2023-05-18 ENCOUNTER — TELEPHONE (OUTPATIENT)
Dept: HEPATOLOGY | Facility: CLINIC | Age: 68
End: 2023-05-18
Payer: COMMERCIAL

## 2023-05-18 NOTE — TELEPHONE ENCOUNTER
----- Message from Deanna Cota MD sent at 5/15/2023 10:14 AM CDT -----  Recommendations:  -  Labs every 3 months:  CBC, CMP, PT INR  -  Next imaging will be ordered by IR after treatment of the liver lesion.  -  Cardiology appt (heart failure) for pulm hypertension.   -  Low salt in the diet, avoid canned, bottled and processed foods.  -  Continue current meds  -  Avoid alcohol, smoking, sedatives and meds with codeine.  -  Use sliding scale for lactulose as follows:  Take 1 cup 30 mL at 8AM.  If no stool by 12 noon, take 30 mL more of lactulose.  If <2 stools by 3 PM, take 30 mL more of lactulose.  Goal is 3-4 soft stools daily.   -  Avoid high intake of Tylenol (more than 4 extra-strength pills in one day)  -  Call us if any bleeding, fevers, confusion, disorientation occur  -  Endoscopies: to be done here in GI.   -  Return in approx 4 months (3 months after Y-90).

## 2023-05-18 NOTE — TELEPHONE ENCOUNTER
Labs every 3 months:  CBC, CMP, PT INR. Appt schd.  Return in approx 4 months (3 months after Y-90). Appt schd and mailed. SYLVIA FRYE

## 2023-05-23 ENCOUNTER — PATIENT MESSAGE (OUTPATIENT)
Dept: INTERVENTIONAL RADIOLOGY/VASCULAR | Facility: CLINIC | Age: 68
End: 2023-05-23
Payer: COMMERCIAL

## 2023-05-26 ENCOUNTER — PATIENT MESSAGE (OUTPATIENT)
Dept: HEPATOLOGY | Facility: CLINIC | Age: 68
End: 2023-05-26
Payer: COMMERCIAL

## 2023-05-26 DIAGNOSIS — K70.30 ALCOHOLIC CIRRHOSIS OF LIVER WITHOUT ASCITES: Primary | ICD-10-CM

## 2023-06-05 ENCOUNTER — TELEPHONE (OUTPATIENT)
Dept: INTERVENTIONAL RADIOLOGY/VASCULAR | Facility: HOSPITAL | Age: 68
End: 2023-06-05
Payer: COMMERCIAL

## 2023-06-06 ENCOUNTER — HOSPITAL ENCOUNTER (OUTPATIENT)
Dept: INTERVENTIONAL RADIOLOGY/VASCULAR | Facility: HOSPITAL | Age: 68
Discharge: HOME | End: 2023-06-06
Payer: COMMERCIAL

## 2023-06-06 ENCOUNTER — HOSPITAL ENCOUNTER (OUTPATIENT)
Dept: RADIOLOGY | Facility: HOSPITAL | Age: 68
Discharge: HOME | End: 2023-06-06
Payer: COMMERCIAL

## 2023-06-06 VITALS
HEIGHT: 72 IN | OXYGEN SATURATION: 97 % | SYSTOLIC BLOOD PRESSURE: 136 MMHG | DIASTOLIC BLOOD PRESSURE: 70 MMHG | WEIGHT: 280 LBS | TEMPERATURE: 98 F | RESPIRATION RATE: 21 BRPM | HEART RATE: 83 BPM | BODY MASS INDEX: 37.93 KG/M2

## 2023-06-06 DIAGNOSIS — C22.0 HCC (HEPATOCELLULAR CARCINOMA): ICD-10-CM

## 2023-06-06 PROCEDURE — 76937 US GUIDE VASCULAR ACCESS: CPT | Mod: 26,,, | Performed by: STUDENT IN AN ORGANIZED HEALTH CARE EDUCATION/TRAINING PROGRAM

## 2023-06-06 PROCEDURE — 78201 LIVER IMAGING STATIC ONLY: CPT | Mod: 26,59,, | Performed by: STUDENT IN AN ORGANIZED HEALTH CARE EDUCATION/TRAINING PROGRAM

## 2023-06-06 PROCEDURE — 78830 RP LOCLZJ TUM SPECT W/CT 1: CPT | Mod: TC

## 2023-06-06 PROCEDURE — 36248 INS CATH ABD/L-EXT ART ADDL: CPT | Mod: 59,,, | Performed by: STUDENT IN AN ORGANIZED HEALTH CARE EDUCATION/TRAINING PROGRAM

## 2023-06-06 PROCEDURE — 25500020 PHARM REV CODE 255

## 2023-06-06 PROCEDURE — 75774 PR  ANGIO EA ADDNL SELECTV VESSEL: ICD-10-PCS | Mod: 26,59,, | Performed by: STUDENT IN AN ORGANIZED HEALTH CARE EDUCATION/TRAINING PROGRAM

## 2023-06-06 PROCEDURE — 36248 INS CATH ABD/L-EXT ART ADDL: CPT | Mod: 59 | Performed by: STUDENT IN AN ORGANIZED HEALTH CARE EDUCATION/TRAINING PROGRAM

## 2023-06-06 PROCEDURE — 76377 3D RENDER W/INTRP POSTPROCES: CPT | Mod: 26,59,, | Performed by: STUDENT IN AN ORGANIZED HEALTH CARE EDUCATION/TRAINING PROGRAM

## 2023-06-06 PROCEDURE — 75726 ARTERY X-RAYS ABDOMEN: CPT | Mod: TC | Performed by: STUDENT IN AN ORGANIZED HEALTH CARE EDUCATION/TRAINING PROGRAM

## 2023-06-06 PROCEDURE — 75726 ARTERY X-RAYS ABDOMEN: CPT | Mod: 26,,, | Performed by: STUDENT IN AN ORGANIZED HEALTH CARE EDUCATION/TRAINING PROGRAM

## 2023-06-06 PROCEDURE — 75774 ARTERY X-RAY EACH VESSEL: CPT | Mod: TC,59 | Performed by: STUDENT IN AN ORGANIZED HEALTH CARE EDUCATION/TRAINING PROGRAM

## 2023-06-06 PROCEDURE — 36247 PR PLACE CATH SUBSUBSELECT ART,ABD/PEL: ICD-10-PCS | Mod: 51,LT,, | Performed by: STUDENT IN AN ORGANIZED HEALTH CARE EDUCATION/TRAINING PROGRAM

## 2023-06-06 PROCEDURE — 75887 VEIN X-RAY LIVER W/O HEMODYN: CPT | Mod: TC | Performed by: STUDENT IN AN ORGANIZED HEALTH CARE EDUCATION/TRAINING PROGRAM

## 2023-06-06 PROCEDURE — 63600175 PHARM REV CODE 636 W HCPCS: Performed by: STUDENT IN AN ORGANIZED HEALTH CARE EDUCATION/TRAINING PROGRAM

## 2023-06-06 PROCEDURE — 75887 VEIN X-RAY LIVER W/O HEMODYN: CPT | Mod: 26,,, | Performed by: STUDENT IN AN ORGANIZED HEALTH CARE EDUCATION/TRAINING PROGRAM

## 2023-06-06 PROCEDURE — 25000003 PHARM REV CODE 250: Performed by: STUDENT IN AN ORGANIZED HEALTH CARE EDUCATION/TRAINING PROGRAM

## 2023-06-06 PROCEDURE — 78201 NM LIVER IMAGING STATIC PRE Y-90 EMBOLIZATION: ICD-10-PCS | Mod: 26,59,, | Performed by: STUDENT IN AN ORGANIZED HEALTH CARE EDUCATION/TRAINING PROGRAM

## 2023-06-06 PROCEDURE — 76937 US GUIDE VASCULAR ACCESS: CPT | Mod: TC | Performed by: STUDENT IN AN ORGANIZED HEALTH CARE EDUCATION/TRAINING PROGRAM

## 2023-06-06 PROCEDURE — G0269 OCCLUSIVE DEVICE IN VEIN ART: HCPCS | Performed by: STUDENT IN AN ORGANIZED HEALTH CARE EDUCATION/TRAINING PROGRAM

## 2023-06-06 PROCEDURE — 77290 IR EMBOLIZATION COMP FOR TUMOR_ORGAN ISCHEMIA_INFARC: ICD-10-PCS | Mod: 26,,, | Performed by: STUDENT IN AN ORGANIZED HEALTH CARE EDUCATION/TRAINING PROGRAM

## 2023-06-06 PROCEDURE — 77290 THER RAD SIMULAJ FIELD CPLX: CPT | Mod: TC | Performed by: STUDENT IN AN ORGANIZED HEALTH CARE EDUCATION/TRAINING PROGRAM

## 2023-06-06 PROCEDURE — C1769 GUIDE WIRE: HCPCS

## 2023-06-06 PROCEDURE — 78830 NM SPECT/CT SINGLE AREA: ICD-10-PCS | Mod: 26,,, | Performed by: STUDENT IN AN ORGANIZED HEALTH CARE EDUCATION/TRAINING PROGRAM

## 2023-06-06 PROCEDURE — 75726 CHG ANGIO VISCERAL SELECTV/SUBSELEC: ICD-10-PCS | Mod: 26,,, | Performed by: STUDENT IN AN ORGANIZED HEALTH CARE EDUCATION/TRAINING PROGRAM

## 2023-06-06 PROCEDURE — 76377 PR  3D RENDERING W/ IMAGE POSTPROCESS: ICD-10-PCS | Mod: 26,59,, | Performed by: STUDENT IN AN ORGANIZED HEALTH CARE EDUCATION/TRAINING PROGRAM

## 2023-06-06 PROCEDURE — 36248 PR PR INS CATH ABD/L-EXT ART ADDL 2ND ORD/3RD ORD/BYD: ICD-10-PCS | Mod: 59,,, | Performed by: STUDENT IN AN ORGANIZED HEALTH CARE EDUCATION/TRAINING PROGRAM

## 2023-06-06 PROCEDURE — 78830 RP LOCLZJ TUM SPECT W/CT 1: CPT | Mod: 26,,, | Performed by: STUDENT IN AN ORGANIZED HEALTH CARE EDUCATION/TRAINING PROGRAM

## 2023-06-06 PROCEDURE — 76380 CAT SCAN FOLLOW-UP STUDY: CPT | Mod: TC | Performed by: STUDENT IN AN ORGANIZED HEALTH CARE EDUCATION/TRAINING PROGRAM

## 2023-06-06 PROCEDURE — 36247 INS CATH ABD/L-EXT ART 3RD: CPT | Mod: LT | Performed by: STUDENT IN AN ORGANIZED HEALTH CARE EDUCATION/TRAINING PROGRAM

## 2023-06-06 PROCEDURE — 75774 ARTERY X-RAY EACH VESSEL: CPT | Mod: 26,59,, | Performed by: STUDENT IN AN ORGANIZED HEALTH CARE EDUCATION/TRAINING PROGRAM

## 2023-06-06 PROCEDURE — 36247 INS CATH ABD/L-EXT ART 3RD: CPT | Mod: 51,LT,, | Performed by: STUDENT IN AN ORGANIZED HEALTH CARE EDUCATION/TRAINING PROGRAM

## 2023-06-06 PROCEDURE — 78201 LIVER IMAGING STATIC ONLY: CPT | Mod: TC

## 2023-06-06 PROCEDURE — 76380 CAT SCAN FOLLOW-UP STUDY: CPT | Mod: 26,,, | Performed by: STUDENT IN AN ORGANIZED HEALTH CARE EDUCATION/TRAINING PROGRAM

## 2023-06-06 PROCEDURE — 76937 PR  US GUIDE, VASCULAR ACCESS: ICD-10-PCS | Mod: 26,,, | Performed by: STUDENT IN AN ORGANIZED HEALTH CARE EDUCATION/TRAINING PROGRAM

## 2023-06-06 PROCEDURE — 76380 PR  CT SCAN,LIMITED/LOCALIZED F/U STUDY: ICD-10-PCS | Mod: 26,,, | Performed by: STUDENT IN AN ORGANIZED HEALTH CARE EDUCATION/TRAINING PROGRAM

## 2023-06-06 PROCEDURE — 76377 3D RENDER W/INTRP POSTPROCES: CPT | Mod: TC,59 | Performed by: STUDENT IN AN ORGANIZED HEALTH CARE EDUCATION/TRAINING PROGRAM

## 2023-06-06 PROCEDURE — 75887 PR  PERCUT XHEPATIC PORTOGRAM: ICD-10-PCS | Mod: 26,,, | Performed by: STUDENT IN AN ORGANIZED HEALTH CARE EDUCATION/TRAINING PROGRAM

## 2023-06-06 RX ORDER — FENTANYL CITRATE 50 UG/ML
INJECTION, SOLUTION INTRAMUSCULAR; INTRAVENOUS
Status: COMPLETED | OUTPATIENT
Start: 2023-06-06 | End: 2023-06-06

## 2023-06-06 RX ORDER — MIDAZOLAM HYDROCHLORIDE 1 MG/ML
INJECTION INTRAMUSCULAR; INTRAVENOUS
Status: COMPLETED | OUTPATIENT
Start: 2023-06-06 | End: 2023-06-06

## 2023-06-06 RX ORDER — LIDOCAINE HYDROCHLORIDE 10 MG/ML
INJECTION INFILTRATION; PERINEURAL
Status: COMPLETED | OUTPATIENT
Start: 2023-06-06 | End: 2023-06-06

## 2023-06-06 RX ORDER — SODIUM CHLORIDE 9 MG/ML
INJECTION, SOLUTION INTRAVENOUS CONTINUOUS
Status: DISCONTINUED | OUTPATIENT
Start: 2023-06-06 | End: 2023-06-07 | Stop reason: HOSPADM

## 2023-06-06 RX ORDER — LIDOCAINE HYDROCHLORIDE 10 MG/ML
1 INJECTION, SOLUTION EPIDURAL; INFILTRATION; INTRACAUDAL; PERINEURAL ONCE AS NEEDED
Status: DISCONTINUED | OUTPATIENT
Start: 2023-06-06 | End: 2023-06-07 | Stop reason: HOSPADM

## 2023-06-06 RX ADMIN — IOHEXOL 105 ML: 300 INJECTION, SOLUTION INTRAVENOUS at 11:06

## 2023-06-06 RX ADMIN — MIDAZOLAM HYDROCHLORIDE 0.5 MG: 1 INJECTION INTRAMUSCULAR; INTRAVENOUS at 10:06

## 2023-06-06 RX ADMIN — LIDOCAINE HYDROCHLORIDE 5 ML: 10 INJECTION, SOLUTION INFILTRATION; PERINEURAL at 10:06

## 2023-06-06 RX ADMIN — FENTANYL CITRATE 50 MCG: 50 INJECTION, SOLUTION INTRAMUSCULAR; INTRAVENOUS at 10:06

## 2023-06-06 RX ADMIN — FENTANYL CITRATE 25 MCG: 50 INJECTION, SOLUTION INTRAMUSCULAR; INTRAVENOUS at 10:06

## 2023-06-06 RX ADMIN — FENTANYL CITRATE 25 MCG: 50 INJECTION, SOLUTION INTRAMUSCULAR; INTRAVENOUS at 11:06

## 2023-06-06 RX ADMIN — MIDAZOLAM HYDROCHLORIDE 1.5 MG: 1 INJECTION INTRAMUSCULAR; INTRAVENOUS at 10:06

## 2023-06-06 NOTE — CARE UPDATE
Pt arrived from [facility] in NAD. VS stable see assessment in computer. Pt can sit up at 13:43pm, then d/c.

## 2023-06-06 NOTE — DISCHARGE INSTRUCTIONS
[facility] [phone] (MON-FRI 8 AM- 5PM). Radiology Resident on call [phone].  Discharge Instructions for Hepatic Angiography  You had a procedure called hepatic angiography. This is an X-ray study of the blood vessels that supply your liver. During  the procedure, a catheter (thin, flexible tube) was inserted into one of your blood vessels through a small incision. A  specially trained doctor called an interventional radiologist usually does the procedure. Heres what to do at home  afterward.  Home care  Follow your doctor's recommendations on when it is safe to drive after the procedure.  Exercise according to your doctors recommendations.  You can shower the day after the procedure.  Ask your doctor when it is safe to swim or take a bath.  Take your medications exactly as directed. Dont skip doses.  Unless directed otherwise, drink 6 to 8 glasses of water a day to prevent dehydration and to help flush your body of  the dye that was used during your procedure.  Take your temperature and check the place where your incision was made for signs of infection (redness, swelling, bleeding or  warmth) every day for a week.  Report any numbness or coolness to the extremity. Report any discoloration to the puncture site or the extremity.  Follow-up care  Make a follow-up appointment as directed by our staff.  Ask your doctor when you can return to work.  Date Last Reviewed: 6/14/2015  © 2588-2144 [employer]. [address] [place] [address]. All rights reserved. This information  is not intended as a substitute for professional medical care. Always follow your healthcare professional's instructions.

## 2023-06-06 NOTE — H&P
"Radiology History & Physical      SUBJECTIVE:     Chief Complaint: HCC    History of Present Illness:  Juan Carlos Yoo Sr. is a 67 y.o. male who presents for pre Y90 mapping.    "Referral from Dr. Cota to discuss y90.  Patient was discussed in a multidisciplinary liver conference (including surgery, oncology, hepatology, and IR). This is a 67 y.o. male here decomp cirrhosis, hepatic encephalopathy, thrombocytopenia, eso varices wo bleeding, and Afib presents to discuss y90. Endorses some fatigue and MACK (chronic). No other complaints today. Would like to proceed with scheduling has some up coming trips coming up. "    Past Medical History:   Diagnosis Date    Atrial fibrillation     Bulging disc     Cirrhosis     Colon polyp 12/29/2017    Rpt 5 yrs    EV (esophageal varices)     Hypertension 3/30/2023    Skin cancer 03/2017    Thrombocytopenia      Past Surgical History:   Procedure Laterality Date    CATHETERIZATION OF BOTH LEFT AND RIGHT HEART N/A 2/8/2023    Procedure: CATHETERIZATION, HEART, BOTH LEFT AND RIGHT;  Surgeon: Flo Malone MD;  Location: [facility] CATH LAB;  Service: Cardiology;  Laterality: N/A;  low bleeding risk 1.0%    CHOLECYSTECTOMY      COLONOSCOPY      COLONOSCOPY N/A 12/29/2017    ta rpt 2022    CORONARY ANGIOGRAPHY N/A 2/8/2023    Procedure: ANGIOGRAM, CORONARY ARTERY;  Surgeon: Flo Malone MD;  Location: [facility] CATH LAB;  Service: Cardiology;  Laterality: N/A;    HERNIA REPAIR      SKIN BIOPSY  03/2017    TONSILLECTOMY      UPPER GASTROINTESTINAL ENDOSCOPY  2011    EV    UPPER GASTROINTESTINAL ENDOSCOPY  2016    VASECTOMY         Home Meds:   Prior to Admission medications    Medication Sig Start Date End Date Taking? Authorizing Provider   amLODIPine (NORVASC) 10 MG tablet Take 10 mg by mouth once daily.   Yes Historical Provider   apixaban (ELIQUIS) 5 mg Tab Take 1 tablet (5 mg total) by mouth 2 (two) times daily. 12/15/22  Yes Cody Louis MD   ascorbic acid, vitamin C, " (VITAMIN C) 500 MG tablet Take 500 mg by mouth once daily.   Yes Historical Provider   cyanocobalamin (VITAMIN B-12) 100 MCG tablet Take 100 mcg by mouth once daily.   Yes Historical Provider   fish oil-omega-3 fatty acids 300-1,000 mg capsule Take 1 g by mouth 2 (two) times daily.   Yes Historical Provider   furosemide (LASIX) 40 MG tablet Take 1 tablet (40 mg total) by mouth 2 (two) times a day. 4/11/23  Yes ELSY Kilpatrick   nadoloL (CORGARD) 20 MG tablet Take 2 tablets (40 mg total) by mouth every evening. 12/15/22  Yes Cody Louis MD   pantoprazole (PROTONIX) 40 MG tablet Take 1 tablet (40 mg total) by mouth once daily. 1/10/23 6/6/23 Yes ELSY Kilpatrick   spironolactone (ALDACTONE) 50 MG tablet Take 1 tablet (50 mg total) by mouth once daily. 9/26/22  Yes ELSY Kilpatrick   tamsulosin (FLOMAX) 0.4 mg Cap Take 1 capsule (0.4 mg total) by mouth every evening. 9/29/22  Yes ELSY Kilpatrick     Anticoagulants/Antiplatelets:  Eliquis    Allergies: Review of patient's allergies indicates:  No Known Allergies  Sedation History:  have not been any systemic reactions    Review of Systems:   Hematological: negative  no known coagulopathies  Respiratory: no cough, shortness of breath, or wheezing  no shortness of breath  Cardiovascular: no chest pain or dyspnea on exertion  no chest pain  Gastrointestinal: no abdominal pain, change in bowel habits, or black or bloody stools  no abdominal pain  Genito-Urinary: no dysuria, trouble voiding, or hematuria  no dysuria  Musculoskeletal: negative  Neurological: no TIA or stroke symptoms         OBJECTIVE:     Vital Signs (Most Recent)  Temp: 97.9 °F (36.6 °C) (06/06/23 0927)  Pulse: 93 (06/06/23 0927)  Resp: 17 (06/06/23 0927)  BP: (!) 146/86 (06/06/23 0928)  SpO2: 97 % (06/06/23 0927)    Physical Exam:  ASA: 2  Mallampati: 2    General: no acute distress  Mental Status: alert and oriented to person, place and time  HEENT: normocephalic, atraumatic  Chest:  unlabored breathing  Heart: regular heart rate  Abdomen: nondistended  Extremity: moves all extremities    Laboratory  Lab Results   Component Value Date    INR 1.3 (H) 06/05/2023       Lab Results   Component Value Date    WBC 6.24 06/05/2023    HGB 13.8 (L) 06/05/2023    HCT 40.9 06/05/2023    MCV 91 06/05/2023     (L) 06/05/2023      Lab Results   Component Value Date     (H) 06/05/2023     06/05/2023    K 3.4 (L) 06/05/2023    CL 99 06/05/2023    CO2 34 (H) 06/05/2023    BUN 20 06/05/2023    CREATININE 1.4 06/05/2023    CALCIUM 8.6 (L) 06/05/2023    MG 1.4 (L) 11/29/2022    ALT 10 06/05/2023    AST 20 06/05/2023    ALBUMIN 3.2 (L) 06/05/2023    BILITOT 1.5 (H) 06/05/2023    BILIDIR 0.5 (H) 06/05/2023       ASSESSMENT/PLAN:     Sedation Plan: Moderte  Patient will undergo Pre Y90 mapping.    Ariel Campbell DO

## 2023-06-06 NOTE — PLAN OF CARE
Pt ambulated on unit this morning by himself.  Pt son is here at facility but parking car.  Denies pain or SOB.  Verbalized understanding of procedure.  Oriented to unit and call bell provided.  Notified JAZMYNE Delatorre in IR that patient states that he took his eliquis yesterday morning and this is OK per JAZMYNE Delatorre.  Will continue to monitor.

## 2023-06-06 NOTE — PROCEDURES
Interventional Radiology postop note    Pre Op Diagnosis: HCC  Post Op Diagnosis: Same    Procedure: Pre Y90 mapping     Procedure performed by: Trudi    Written Informed Consent Obtained: Yes  Specimen Removed: NO  Estimated Blood Loss: Minimal    Findings:   Via right CFA, angiography demonstrated tumor enhancement within the right hepatic lobe supplied via subseg 8. MAA injected via right hepatic artery.     5 Fr RCFA access closed with Angioseal, Hemostasis achieved.     Patient tolerated procedure well.    Right leg straight for 2 hours.  Patient to undergo nuclear medicine scan for lung shunt calculation.    Ariel Campbell,   Interventional Radiology

## 2023-06-06 NOTE — DISCHARGE SUMMARY
Radiology Discharge Summary      Hospital Course: No complications    Admit Date: 6/6/2023  Discharge Date: 06/06/2023     Instructions Given to Patient: Yes  Diet: Resume prior diet  Activity: no lifting, Driving, or Strenuous exercise for 5 days    Description of Condition on Discharge: Stable  Vital Signs (Most Recent): Temp: 97.9 °F (36.6 °C) (06/06/23 0927)  Pulse: 84 (06/06/23 1128)  Resp: 16 (06/06/23 1128)  BP: 121/65 (06/06/23 1128)  SpO2: 98 % (06/06/23 1128)    Discharge Disposition: Home    Discharge Diagnosis: HCC    Ariel Campbell DO

## 2023-06-06 NOTE — CARE UPDATE
Pt fully recoverd and up at 13:43. Right groin CDI, no s/s of bleeding or hematoma. Pt states full understanding of discharge instructions. Pt to meet son in front of hospital shortly with escort.

## 2023-06-06 NOTE — CARE UPDATE
Pt up at 1:43pm right groin CDI. Pt states full understanding of discharge instructions. Pt to leave shortly with escort to meet son.

## 2023-06-07 NOTE — PROGRESS NOTES
"Y90 treatment is medically urgent secondary to cancer treatment.    Carlito Her MD ([facility])  Interventional Radiology        "

## 2023-06-08 ENCOUNTER — HOSPITAL ENCOUNTER (OUTPATIENT)
Dept: RADIOLOGY | Facility: HOSPITAL | Age: 68
Discharge: HOME | End: 2023-06-08
Payer: COMMERCIAL

## 2023-06-08 ENCOUNTER — HOSPITAL ENCOUNTER (OUTPATIENT)
Dept: INTERVENTIONAL RADIOLOGY/VASCULAR | Facility: HOSPITAL | Age: 68
Discharge: HOME | End: 2023-06-08
Admitting: STUDENT IN AN ORGANIZED HEALTH CARE EDUCATION/TRAINING PROGRAM
Payer: COMMERCIAL

## 2023-06-08 VITALS
RESPIRATION RATE: 20 BRPM | HEIGHT: 73 IN | SYSTOLIC BLOOD PRESSURE: 149 MMHG | BODY MASS INDEX: 36.84 KG/M2 | WEIGHT: 278 LBS | OXYGEN SATURATION: 96 % | DIASTOLIC BLOOD PRESSURE: 70 MMHG | HEART RATE: 77 BPM | TEMPERATURE: 97 F

## 2023-06-08 DIAGNOSIS — C22.0 HCC (HEPATOCELLULAR CARCINOMA): ICD-10-CM

## 2023-06-08 DIAGNOSIS — C22.0 HCC (HEPATOCELLULAR CARCINOMA): Primary | ICD-10-CM

## 2023-06-08 PROCEDURE — 77263 PR  RADIATION THERAPY PLAN COMPLEX: ICD-10-PCS | Mod: ,,, | Performed by: STUDENT IN AN ORGANIZED HEALTH CARE EDUCATION/TRAINING PROGRAM

## 2023-06-08 PROCEDURE — 36247 INS CATH ABD/L-EXT ART 3RD: CPT | Mod: RT | Performed by: STUDENT IN AN ORGANIZED HEALTH CARE EDUCATION/TRAINING PROGRAM

## 2023-06-08 PROCEDURE — 76380 PR  CT SCAN,LIMITED/LOCALIZED F/U STUDY: ICD-10-PCS | Mod: 26,59,, | Performed by: STUDENT IN AN ORGANIZED HEALTH CARE EDUCATION/TRAINING PROGRAM

## 2023-06-08 PROCEDURE — 76377 3D RENDER W/INTRP POSTPROCES: CPT | Mod: 26,,, | Performed by: STUDENT IN AN ORGANIZED HEALTH CARE EDUCATION/TRAINING PROGRAM

## 2023-06-08 PROCEDURE — 99152 MOD SED SAME PHYS/QHP 5/>YRS: CPT | Performed by: STUDENT IN AN ORGANIZED HEALTH CARE EDUCATION/TRAINING PROGRAM

## 2023-06-08 PROCEDURE — 99153 MOD SED SAME PHYS/QHP EA: CPT | Mod: 59 | Performed by: STUDENT IN AN ORGANIZED HEALTH CARE EDUCATION/TRAINING PROGRAM

## 2023-06-08 PROCEDURE — 78201 LIVER IMAGING STATIC ONLY: CPT | Mod: TC,59

## 2023-06-08 PROCEDURE — 78830 RP LOCLZJ TUM SPECT W/CT 1: CPT | Mod: 26,,, | Performed by: STUDENT IN AN ORGANIZED HEALTH CARE EDUCATION/TRAINING PROGRAM

## 2023-06-08 PROCEDURE — 37243 IR EMBOLIZATION COMP FOR TUMOR_ORGAN ISCHEMIA_INFARC: ICD-10-PCS | Mod: ,,, | Performed by: STUDENT IN AN ORGANIZED HEALTH CARE EDUCATION/TRAINING PROGRAM

## 2023-06-08 PROCEDURE — 77300 PR RADIATION THERAPY,DOSIMETRY PLAN: ICD-10-PCS | Mod: 26,,, | Performed by: STUDENT IN AN ORGANIZED HEALTH CARE EDUCATION/TRAINING PROGRAM

## 2023-06-08 PROCEDURE — 79445 NUCLEAR RX INTRA-ARTERIAL: CPT | Mod: TC | Performed by: STUDENT IN AN ORGANIZED HEALTH CARE EDUCATION/TRAINING PROGRAM

## 2023-06-08 PROCEDURE — 79445 NUCLEAR RX INTRA-ARTERIAL: CPT | Mod: 26,,, | Performed by: STUDENT IN AN ORGANIZED HEALTH CARE EDUCATION/TRAINING PROGRAM

## 2023-06-08 PROCEDURE — 76377 3D RENDER W/INTRP POSTPROCES: CPT | Mod: TC | Performed by: STUDENT IN AN ORGANIZED HEALTH CARE EDUCATION/TRAINING PROGRAM

## 2023-06-08 PROCEDURE — 76937 PR  US GUIDE, VASCULAR ACCESS: ICD-10-PCS | Mod: 26,,, | Performed by: STUDENT IN AN ORGANIZED HEALTH CARE EDUCATION/TRAINING PROGRAM

## 2023-06-08 PROCEDURE — 76377 PR  3D RENDERING W/ IMAGE POSTPROCESS: ICD-10-PCS | Mod: 26,,, | Performed by: STUDENT IN AN ORGANIZED HEALTH CARE EDUCATION/TRAINING PROGRAM

## 2023-06-08 PROCEDURE — 36247 INS CATH ABD/L-EXT ART 3RD: CPT | Mod: 51,RT,, | Performed by: STUDENT IN AN ORGANIZED HEALTH CARE EDUCATION/TRAINING PROGRAM

## 2023-06-08 PROCEDURE — 78201 LIVER IMAGING STATIC ONLY: CPT | Mod: 26,59,, | Performed by: STUDENT IN AN ORGANIZED HEALTH CARE EDUCATION/TRAINING PROGRAM

## 2023-06-08 PROCEDURE — 75774 ARTERY X-RAY EACH VESSEL: CPT | Mod: TC,59 | Performed by: STUDENT IN AN ORGANIZED HEALTH CARE EDUCATION/TRAINING PROGRAM

## 2023-06-08 PROCEDURE — 76937 US GUIDE VASCULAR ACCESS: CPT | Mod: TC | Performed by: STUDENT IN AN ORGANIZED HEALTH CARE EDUCATION/TRAINING PROGRAM

## 2023-06-08 PROCEDURE — 77300 RADIATION THERAPY DOSE PLAN: CPT | Mod: TC | Performed by: STUDENT IN AN ORGANIZED HEALTH CARE EDUCATION/TRAINING PROGRAM

## 2023-06-08 PROCEDURE — 75726 ARTERY X-RAYS ABDOMEN: CPT | Mod: TC,59 | Performed by: STUDENT IN AN ORGANIZED HEALTH CARE EDUCATION/TRAINING PROGRAM

## 2023-06-08 PROCEDURE — 63600175 PHARM REV CODE 636 W HCPCS: Performed by: STUDENT IN AN ORGANIZED HEALTH CARE EDUCATION/TRAINING PROGRAM

## 2023-06-08 PROCEDURE — 76380 CAT SCAN FOLLOW-UP STUDY: CPT | Mod: TC,59 | Performed by: STUDENT IN AN ORGANIZED HEALTH CARE EDUCATION/TRAINING PROGRAM

## 2023-06-08 PROCEDURE — 37243 VASC EMBOLIZE/OCCLUDE ORGAN: CPT | Performed by: STUDENT IN AN ORGANIZED HEALTH CARE EDUCATION/TRAINING PROGRAM

## 2023-06-08 PROCEDURE — 75726 CHG ANGIO VISCERAL SELECTV/SUBSELEC: ICD-10-PCS | Mod: 26,59,, | Performed by: STUDENT IN AN ORGANIZED HEALTH CARE EDUCATION/TRAINING PROGRAM

## 2023-06-08 PROCEDURE — 79445 PR  NUCLEAR THERAPY, INTRA-ARTERIAL: ICD-10-PCS | Mod: 26,,, | Performed by: STUDENT IN AN ORGANIZED HEALTH CARE EDUCATION/TRAINING PROGRAM

## 2023-06-08 PROCEDURE — G0269 OCCLUSIVE DEVICE IN VEIN ART: HCPCS | Performed by: STUDENT IN AN ORGANIZED HEALTH CARE EDUCATION/TRAINING PROGRAM

## 2023-06-08 PROCEDURE — 75774 ARTERY X-RAY EACH VESSEL: CPT | Mod: 26,59,, | Performed by: STUDENT IN AN ORGANIZED HEALTH CARE EDUCATION/TRAINING PROGRAM

## 2023-06-08 PROCEDURE — 75726 ARTERY X-RAYS ABDOMEN: CPT | Mod: 26,59,, | Performed by: STUDENT IN AN ORGANIZED HEALTH CARE EDUCATION/TRAINING PROGRAM

## 2023-06-08 PROCEDURE — 77263 THER RADIOLOGY TX PLNG CPLX: CPT | Mod: ,,, | Performed by: STUDENT IN AN ORGANIZED HEALTH CARE EDUCATION/TRAINING PROGRAM

## 2023-06-08 PROCEDURE — 76937 US GUIDE VASCULAR ACCESS: CPT | Mod: 26,,, | Performed by: STUDENT IN AN ORGANIZED HEALTH CARE EDUCATION/TRAINING PROGRAM

## 2023-06-08 PROCEDURE — 78830 NM SPECT/CT SINGLE AREA: ICD-10-PCS | Mod: 26,,, | Performed by: STUDENT IN AN ORGANIZED HEALTH CARE EDUCATION/TRAINING PROGRAM

## 2023-06-08 PROCEDURE — 77300 RADIATION THERAPY DOSE PLAN: CPT | Mod: 26,,, | Performed by: STUDENT IN AN ORGANIZED HEALTH CARE EDUCATION/TRAINING PROGRAM

## 2023-06-08 PROCEDURE — 25500020 PHARM REV CODE 255: Performed by: STUDENT IN AN ORGANIZED HEALTH CARE EDUCATION/TRAINING PROGRAM

## 2023-06-08 PROCEDURE — 75774 PR  ANGIO EA ADDNL SELECTV VESSEL: ICD-10-PCS | Mod: 26,59,, | Performed by: STUDENT IN AN ORGANIZED HEALTH CARE EDUCATION/TRAINING PROGRAM

## 2023-06-08 PROCEDURE — C1769 GUIDE WIRE: HCPCS

## 2023-06-08 PROCEDURE — 76380 CAT SCAN FOLLOW-UP STUDY: CPT | Mod: 26,59,, | Performed by: STUDENT IN AN ORGANIZED HEALTH CARE EDUCATION/TRAINING PROGRAM

## 2023-06-08 PROCEDURE — 36247 PR PLACE CATH SUBSUBSELECT ART,ABD/PEL: ICD-10-PCS | Mod: 51,RT,, | Performed by: STUDENT IN AN ORGANIZED HEALTH CARE EDUCATION/TRAINING PROGRAM

## 2023-06-08 PROCEDURE — 78830 RP LOCLZJ TUM SPECT W/CT 1: CPT | Mod: TC,59

## 2023-06-08 PROCEDURE — 78201 NM LIVER IMAGING STATIC POST Y-90 EMBOLIZATION: ICD-10-PCS | Mod: 26,59,, | Performed by: STUDENT IN AN ORGANIZED HEALTH CARE EDUCATION/TRAINING PROGRAM

## 2023-06-08 RX ORDER — LIDOCAINE HYDROCHLORIDE 10 MG/ML
1 INJECTION, SOLUTION EPIDURAL; INFILTRATION; INTRACAUDAL; PERINEURAL ONCE AS NEEDED
Status: DISCONTINUED | OUTPATIENT
Start: 2023-06-08 | End: 2023-06-09 | Stop reason: HOSPADM

## 2023-06-08 RX ORDER — SODIUM CHLORIDE 9 MG/ML
INJECTION, SOLUTION INTRAVENOUS CONTINUOUS
Status: DISCONTINUED | OUTPATIENT
Start: 2023-06-08 | End: 2023-06-09 | Stop reason: HOSPADM

## 2023-06-08 RX ORDER — MIDAZOLAM HYDROCHLORIDE 1 MG/ML
INJECTION INTRAMUSCULAR; INTRAVENOUS
Status: COMPLETED | OUTPATIENT
Start: 2023-06-08 | End: 2023-06-08

## 2023-06-08 RX ORDER — FENTANYL CITRATE 50 UG/ML
INJECTION, SOLUTION INTRAMUSCULAR; INTRAVENOUS
Status: COMPLETED | OUTPATIENT
Start: 2023-06-08 | End: 2023-06-08

## 2023-06-08 RX ORDER — DEXAMETHASONE SODIUM PHOSPHATE 100 MG/10ML
INJECTION INTRAMUSCULAR; INTRAVENOUS
Status: COMPLETED | OUTPATIENT
Start: 2023-06-08 | End: 2023-06-08

## 2023-06-08 RX ADMIN — MIDAZOLAM HYDROCHLORIDE 1 MG: 1 INJECTION INTRAMUSCULAR; INTRAVENOUS at 03:06

## 2023-06-08 RX ADMIN — MIDAZOLAM HYDROCHLORIDE 0.5 MG: 1 INJECTION INTRAMUSCULAR; INTRAVENOUS at 03:06

## 2023-06-08 RX ADMIN — FENTANYL CITRATE 50 MCG: 50 INJECTION, SOLUTION INTRAMUSCULAR; INTRAVENOUS at 03:06

## 2023-06-08 RX ADMIN — FENTANYL CITRATE 25 MCG: 50 INJECTION, SOLUTION INTRAMUSCULAR; INTRAVENOUS at 03:06

## 2023-06-08 RX ADMIN — DEXAMETHASONE SODIUM PHOSPHATE 20 MG: 10 INJECTION INTRAMUSCULAR; INTRAVENOUS at 03:06

## 2023-06-08 RX ADMIN — IOHEXOL 60 ML: 300 INJECTION, SOLUTION INTRAVENOUS at 03:06

## 2023-06-08 NOTE — PLAN OF CARE
Pt arrived to 189 for Y-90. Pt oriented to unit and staff. Plan of care reviewed with patient, patient verbalizes understanding. Comfort measures utilized. Pt safely transferred from stretcher to procedural table. Fall risk reviewed with patient, fall risk interventions maintained. Safety strap applied, positioner pillows utilized to minimize pressure points. Blankets applied. Pt prepped and draped utilizing standard sterile technique. Patient placed on continuous monitoring, as required by sedation policy. Timeouts completed utilizing standard universal time-out, per department and facility policy. RN to remain at bedside, continuous monitoring maintained. Pt resting comfortably. Denies pain/discomfort. Will continue to monitor. See flow sheets for monitoring, medication administration, and updates.

## 2023-06-08 NOTE — PLAN OF CARE
Pt arrived to MPU bed 4 via stretcher w/ RN. Post procedure bedside report received form JAZMYNE Luevano.

## 2023-06-08 NOTE — PLAN OF CARE
Pt dc'd [facility]ff[facility] via wheelchair w/caregiver. AAO x4, denies pain, vss. Printed dc instructions in hand & reviewed w/ pt. Questions answered, voiced understanding.

## 2023-06-08 NOTE — H&P
Radiology History & Physical      SUBJECTIVE:     Chief Complaint: HCC    History of Present Illness:  Juan Carlos Yoo Sr. is a 67 y.o. male who presents for TARE for treatment of HCC.    Past Medical History:   Diagnosis Date    Atrial fibrillation     Bulging disc     Cirrhosis     Colon polyp 12/29/2017    Rpt 5 yrs    EV (esophageal varices)     Hypertension 3/30/2023    Skin cancer 03/2017    Thrombocytopenia      Past Surgical History:   Procedure Laterality Date    CATHETERIZATION OF BOTH LEFT AND RIGHT HEART N/A 2/8/2023    Procedure: CATHETERIZATION, HEART, BOTH LEFT AND RIGHT;  Surgeon: Flo Malone MD;  Location: [facility] CATH LAB;  Service: Cardiology;  Laterality: N/A;  low bleeding risk 1.0%    CHOLECYSTECTOMY      COLONOSCOPY      COLONOSCOPY N/A 12/29/2017    ta rpt 2022    CORONARY ANGIOGRAPHY N/A 2/8/2023    Procedure: ANGIOGRAM, CORONARY ARTERY;  Surgeon: Flo Malone MD;  Location: [facility] CATH LAB;  Service: Cardiology;  Laterality: N/A;    HERNIA REPAIR      SKIN BIOPSY  03/2017    TONSILLECTOMY      UPPER GASTROINTESTINAL ENDOSCOPY  2011    EV    UPPER GASTROINTESTINAL ENDOSCOPY  2016    VASECTOMY         Home Meds:   Prior to Admission medications    Medication Sig Start Date End Date Taking? Authorizing Provider   amLODIPine (NORVASC) 10 MG tablet Take 10 mg by mouth once daily.   Yes Historical Provider   apixaban (ELIQUIS) 5 mg Tab Take 1 tablet (5 mg total) by mouth 2 (two) times daily. 12/15/22  Yes Cody Louis MD   ascorbic acid, vitamin C, (VITAMIN C) 500 MG tablet Take 500 mg by mouth once daily.   Yes Historical Provider   cyanocobalamin (VITAMIN B-12) 100 MCG tablet Take 100 mcg by mouth once daily.   Yes Historical Provider   furosemide (LASIX) 40 MG tablet Take 1 tablet (40 mg total) by mouth 2 (two) times a day. 4/11/23  Yes ELSY Kilpatrick   nadoloL (CORGARD) 20 MG tablet Take 2 tablets (40 mg total) by mouth every evening. 12/15/22  Yes Cody Louis MD    pantoprazole (PROTONIX) 40 MG tablet Take 1 tablet (40 mg total) by mouth once daily. 1/10/23 6/8/23 Yes ELSY Kilpatrick   spironolactone (ALDACTONE) 50 MG tablet Take 1 tablet (50 mg total) by mouth once daily. 9/26/22  Yes ELSY Kilpatrick   tamsulosin (FLOMAX) 0.4 mg Cap Take 1 capsule (0.4 mg total) by mouth every evening. 9/29/22  Yes ELSY Kilpatrick   fish oil-omega-3 fatty acids 300-1,000 mg capsule Take 1 g by mouth 2 (two) times daily.    Historical Provider     Anticoagulants/Antiplatelets:  Eliquis    Allergies: Review of patient's allergies indicates:  No Known Allergies  Sedation History:  have not been any systemic reactions    Review of Systems:   Hematological: negative  no known coagulopathies  Respiratory: no cough, shortness of breath, or wheezing  no shortness of breath  Cardiovascular: no chest pain or dyspnea on exertion  no chest pain  Gastrointestinal: no abdominal pain, change in bowel habits, or black or bloody stools  no abdominal pain  Genito-Urinary: no dysuria, trouble voiding, or hematuria  no dysuria  Musculoskeletal: negative  Neurological: no TIA or stroke symptoms         OBJECTIVE:     Vital Signs (Most Recent)  Temp: 98.6 °F (37 °C) (06/08/23 1102)  Pulse: 95 (06/08/23 1102)  Resp: 16 (06/08/23 1102)  BP: (!) 150/84 (06/08/23 1103)  SpO2: 97 % (06/08/23 1102)    Physical Exam:  ASA: 2  Mallampati: 2    General: no acute distress  Mental Status: alert and oriented to person, place and time  HEENT: normocephalic, atraumatic  Chest: unlabored breathing  Heart: regular heart rate  Abdomen: nondistended  Extremity: moves all extremities    Laboratory  Lab Results   Component Value Date    INR 1.2 06/08/2023       Lab Results   Component Value Date    WBC 5.86 06/08/2023    HGB 13.4 (L) 06/08/2023    HCT 40.5 06/08/2023    MCV 91 06/08/2023    PLT SEE COMMENT 06/08/2023      Lab Results   Component Value Date     06/08/2023     06/08/2023    K 3.8 06/08/2023     CL 98 06/08/2023    CO2 36 (H) 06/08/2023    BUN 15 06/08/2023    CREATININE 1.2 06/08/2023    CALCIUM 9.2 06/08/2023    MG 1.4 (L) 11/29/2022    ALT 8 (L) 06/08/2023    AST 19 06/08/2023    ALBUMIN 3.2 (L) 06/08/2023    BILITOT 1.9 (H) 06/08/2023    BILIDIR 0.6 (H) 06/08/2023       ASSESSMENT/PLAN:     Sedation Plan: Moderate  Patient will undergo TARE.    Ariel Campbell DO

## 2023-06-08 NOTE — PLAN OF CARE
Received pt to floor from home accompanied by son.  AAO x 4. Denies pain or discomfort. Respirations even and unlabored. No distress noted. Pt stable.  Admit assessment complete. IV x 1 placed.  Pt oriented to room and call bell placed within reach.  Will continue to monitor.

## 2023-06-08 NOTE — CARE UPDATE
patient care assumed from JAZMYNE Ortez, patient already in Nuclear Medicine for post procedure scan, patient to remain flat until 1750, see chart for vital signs and assessment, will continue to monitor patient throughout scan.

## 2023-06-08 NOTE — PLAN OF CARE
Y90 completed. Patient tolerated well; VSS. Hemostasis achieved to left groin via 5 Fr Vascade at 1550; patient to remain flat until 1750. Site CDI without hematoma. Patient to be transported to [facility] accompanied by this RN then to [facility] for recovery; report to be given at the bedside.

## 2023-06-08 NOTE — CARE UPDATE
patient transported to [facility] via stretcher in [facility], bedside report given to JAZMYNE Goldstein.

## 2023-06-08 NOTE — DISCHARGE INSTRUCTIONS
[facility] [phone] (MON-FRI 8 AM- 5PM). Radiology Resident on call [phone].  Discharge Instructions for Hepatic Angiography  You had a procedure called hepatic angiography. This is an X-ray study of the blood vessels that supply your liver. During  the procedure, a catheter (thin, flexible tube) was inserted into one of your blood vessels through a small incision. A  specially trained doctor called an interventional radiologist usually does the procedure. Heres what to do at home  afterward.  Home care  Follow your doctor's recommendations on when it is safe to drive after the procedure.  Exercise according to your doctors recommendations.  You can shower the day after the procedure.  Ask your doctor when it is safe to swim or take a bath.  Take your medications exactly as directed. Dont skip doses.  Unless directed otherwise, drink 6 to 8 glasses of water a day to prevent dehydration and to help flush your body of  the dye that was used during your procedure.  Take your temperature and check the place where your incision was made for signs of infection (redness, swelling, bleeding or  warmth) every day for a week.  Report any numbness or coolness to the extremity. Report any discoloration to the puncture site or the extremity.  Follow-up care  Make a follow-up appointment as directed by our staff.  Ask your doctor when you can return to work.  Date Last Reviewed: 6/14/2015 © 2000-2017 [employer]. [address] [place] [address]. All rights reserved. This information  is not intended as a substitute for professional medical care. Always follow your healthcare professional's instructions. Post Yttrium (Y-90) Instructions    Pregnant women and pets should be no closer to the patient than 3 feet for a period of 3 days.    Children under 10 years of age should be no closer than 3 feet for 3 days, although brief contact with the patient for a few seconds is acceptable.

## 2023-06-08 NOTE — DISCHARGE SUMMARY
Radiology Discharge Summary      Hospital Course: No complications    Admit Date: 6/8/2023  Discharge Date: 06/08/2023     Instructions Given to Patient: Yes  Diet: Resume prior diet  Activity: activity as tolerated    Description of Condition on Discharge: Stable  Vital Signs (Most Recent): Temp: 98.6 °F (37 °C) (06/08/23 1102)  Pulse: 81 (06/08/23 1545)  Resp: 16 (06/08/23 1545)  BP: 121/75 (06/08/23 1545)  SpO2: 95 % (06/08/23 1545)    Discharge Disposition: Home    Discharge Diagnosis: DENTON Campbell DO

## 2023-06-08 NOTE — PROCEDURES
Interventional Radiology postop note    Pre Op Diagnosis: HCC  Post Op Diagnosis: Same    Procedure: TARE    Procedure performed by: Vidal Her and Adrian    Written Informed Consent Obtained: Yes  Specimen Removed: NO  Estimated Blood Loss: Minimal    Findings:   Via right CFA, angiography demonstrated a hypervascular mass in seg 8. Successful TARE delivery via the subseg 8 hepatic artery.    5 Fr LCFA access closed with Vascade, Hemostasis achieved.     Patient tolerated procedure well.    Right leg straight for 2 hours.  Patient to undergo nuclear medicine scan.    Follow up in IR clinic in 1 month with liver mass MRI, CMP, AFP.    Ariel Campbell,   Interventional Radiology

## 2023-06-09 DIAGNOSIS — C22.0 HCC (HEPATOCELLULAR CARCINOMA): Primary | ICD-10-CM

## 2023-06-15 ENCOUNTER — TELEPHONE (OUTPATIENT)
Dept: INTERVENTIONAL RADIOLOGY/VASCULAR | Facility: CLINIC | Age: 68
End: 2023-06-15
Payer: COMMERCIAL

## 2023-06-15 NOTE — TELEPHONE ENCOUNTER
Left message for pt to return my call. Need to schedule IR follow up.  Please forward call to [phone]. Thanks

## 2023-07-18 ENCOUNTER — OFFICE VISIT (OUTPATIENT)
Dept: INTERVENTIONAL RADIOLOGY/VASCULAR | Facility: CLINIC | Age: 68
End: 2023-07-18
Payer: COMMERCIAL

## 2023-07-18 VITALS
WEIGHT: 284.5 LBS | HEART RATE: 94 BPM | HEIGHT: 73 IN | BODY MASS INDEX: 37.71 KG/M2 | SYSTOLIC BLOOD PRESSURE: 143 MMHG | DIASTOLIC BLOOD PRESSURE: 93 MMHG

## 2023-07-18 DIAGNOSIS — C22.0 HCC (HEPATOCELLULAR CARCINOMA): Primary | ICD-10-CM

## 2023-07-18 PROCEDURE — 3077F SYST BP >= 140 MM HG: CPT | Mod: CPTII,S$GLB,, | Performed by: FAMILY MEDICINE

## 2023-07-18 PROCEDURE — 1160F RVW MEDS BY RX/DR IN RCRD: CPT | Mod: CPTII,S$GLB,, | Performed by: FAMILY MEDICINE

## 2023-07-18 PROCEDURE — 3008F BODY MASS INDEX DOCD: CPT | Mod: CPTII,S$GLB,, | Performed by: FAMILY MEDICINE

## 2023-07-18 PROCEDURE — 1159F MED LIST DOCD IN RCRD: CPT | Mod: CPTII,S$GLB,, | Performed by: FAMILY MEDICINE

## 2023-07-18 PROCEDURE — 99999 PR PBB SHADOW E&M-EST. PATIENT-LVL IV: CPT | Mod: PBBFAC,,, | Performed by: FAMILY MEDICINE

## 2023-07-18 PROCEDURE — 99213 OFFICE O/P EST LOW 20 MIN: CPT | Mod: S$GLB,,, | Performed by: FAMILY MEDICINE

## 2023-07-18 PROCEDURE — 3080F DIAST BP >= 90 MM HG: CPT | Mod: CPTII,S$GLB,, | Performed by: FAMILY MEDICINE

## 2023-07-18 PROCEDURE — 99999 PR PBB SHADOW E&M-EST. PATIENT-LVL IV: ICD-10-PCS | Mod: PBBFAC,,, | Performed by: FAMILY MEDICINE

## 2023-07-18 PROCEDURE — 3080F PR MOST RECENT DIASTOLIC BLOOD PRESSURE >= 90 MM HG: ICD-10-PCS | Mod: CPTII,S$GLB,, | Performed by: FAMILY MEDICINE

## 2023-07-18 PROCEDURE — 1160F PR REVIEW ALL MEDS BY PRESCRIBER/CLIN PHARMACIST DOCUMENTED: ICD-10-PCS | Mod: CPTII,S$GLB,, | Performed by: FAMILY MEDICINE

## 2023-07-18 PROCEDURE — 99213 PR OFFICE/OUTPT VISIT, EST, LEVL III, 20-29 MIN: ICD-10-PCS | Mod: S$GLB,,, | Performed by: FAMILY MEDICINE

## 2023-07-18 PROCEDURE — 3077F PR MOST RECENT SYSTOLIC BLOOD PRESSURE >= 140 MM HG: ICD-10-PCS | Mod: CPTII,S$GLB,, | Performed by: FAMILY MEDICINE

## 2023-07-18 PROCEDURE — 3008F PR BODY MASS INDEX (BMI) DOCUMENTED: ICD-10-PCS | Mod: CPTII,S$GLB,, | Performed by: FAMILY MEDICINE

## 2023-07-18 PROCEDURE — 1159F PR MEDICATION LIST DOCUMENTED IN MEDICAL RECORD: ICD-10-PCS | Mod: CPTII,S$GLB,, | Performed by: FAMILY MEDICINE

## 2023-07-18 NOTE — PROGRESS NOTES
Subjective     Patient ID: Juan Carlos Yoo Sr. is a 67 y.o. male.    Chief Complaint: Hepatocellular Carcinoma    Patient here for follow up of hepatocellular carcinoma recently treated with radioembolization on  2023. Patient has a  history of  decompensated cirrhosis, hepatic encephalopathy, and  esophageal varices w/o bleeding. He reports feeling well. He denies any abdominal   distention  or abdominal  pain. He had an MRI and labs  on 2023.    Review of Systems   Constitutional:  Negative for activity change, appetite change, chills, fatigue and fever.   Respiratory:  Negative for cough, shortness of breath, wheezing and stridor.    Cardiovascular:  Negative for chest pain, palpitations and leg swelling.   Gastrointestinal:  Negative for abdominal distention, abdominal pain, constipation, diarrhea, nausea and vomiting.        Objective     Physical Exam  Constitutional:       General: He is not in acute distress.     Appearance: He is well-developed. He is not diaphoretic.   HENT:      Head: Normocephalic and atraumatic.   Pulmonary:      Effort: Pulmonary effort is normal. No respiratory distress.   Neurological:      Mental Status: He is alert and oriented to person, place, and time.   Psychiatric:         Behavior: Behavior normal.         Thought Content: Thought content normal.         Judgment: Judgment normal.     Reviewed hepatology progress note.    ECO  MELD 3.0: 13 at 2023 10:10 AM  MELD-Na: 13 at 2023 10:10 AM  Calculated from:  Serum Creatinine: 1.2 mg/dL at 2023 10:10 AM  Serum Sodium: 140 mmol/L (Using max of 137 mmol/L) at 2023 10:10 AM  Total Bilirubin: 1.9 mg/dL at 2023 10:10 AM  Serum Albumin: 3.2 g/dL at 2023 10:10 AM  INR(ratio): 1.2 at 2023 10:10 AM  Age at listing (hypothetical): 67 years  Sex: Male at 2023 10:10 AM    Child Sun: Class B  Transplant Status: declined  due to high risk from central obesity, BMI 38, chronic A Fib, multiple positive  PETH tests      MRI 7/12/2023  Impression:     Peripheral area of enhancement with central nonenhancement in the dome of the right lobe of the liver suggestive of post embolization changes.     Nodular surface of the liver compatible cirrhosis.     Small right renal cysts..     Trace amount of ascites in the right upper quadrant.    Labs 7/12/2023  Component Ref Range & Units 6 d ago  (7/12/23) 3 mo ago  (3/28/23) 6 mo ago  (12/27/22) 6 mo ago  (12/23/22) 9 mo ago  (10/17/22) 9 mo ago  (9/29/22) 1 yr ago  (5/31/22)   AFP 0.0 - 8.4 ng/mL 7.4  6.2 CM  7.3 High  R, CM  6.4 CM  5.7 CM  3.9 CM  6.8 CM      Component Ref Range & Units 6 d ago  (7/12/23) 1 mo ago  (6/8/23) 1 mo ago  (6/5/23) 2 mo ago  (5/15/23) 3 mo ago  (4/17/23) 3 mo ago  (3/28/23) 4 mo ago  (2/27/23)   Sodium 136 - 145 mmol/L 140  140  140  140  137  140  138    Potassium 3.5 - 5.1 mmol/L 3.1 Low   3.8  3.4 Low   3.5  4.2  3.9  3.6    Chloride 95 - 110 mmol/L 97  98  99  99  100  100  98    CO2 23 - 29 mmol/L 31 High   36 High   34 High   33 High   28  33 High   30 High     Glucose 70 - 110 mg/dL 111 High   108  112 High   105  158 High   104  136 High     BUN 8 - 23 mg/dL 12  15  20  18  13  12  16    Creatinine 0.5 - 1.4 mg/dL 1.3  1.2  1.4  1.4  1.1  1.0  1.2    Calcium 8.7 - 10.5 mg/dL 8.4 Low   9.2  8.6 Low   9.3  9.0  9.0  9.3    Total Protein 6.0 - 8.4 g/dL 6.7  7.2  7.3  7.3  7.4  7.1  7.4    Albumin 3.5 - 5.2 g/dL 2.9 Low   3.2 Low   3.2 Low   3.2 Low   3.2 Low   3.0 Low   3.1 Low     Total Bilirubin 0.1 - 1.0 mg/dL 1.5 High   1.9 High  CM  1.5 High  CM  1.9 High  CM  2.2 High  CM  1.5 High  CM  1.5 High  CM    Comment: For infants and newborns, interpretation of results should be based   on gestational age, weight and in agreement with clinical   observations.     Premature Infant recommended reference ranges:   Up to 24 hours.............<8.0 mg/dL   Up to 48 hours............<12.0 mg/dL   3-5 days..................<15.0 mg/dL   6-29  days.................<15.0 mg/dL    Alkaline Phosphatase 55 - 135 U/L 48 Low   54 Low   52 Low   53 Low   48 Low   52 Low   58    AST 10 - 40 U/L 20  19  20  19  21  21  20    ALT 10 - 44 U/L 10  8 Low   10  10  7 Low   8 Low   9 Low     eGFR >60 mL/min/1.73 m^2 59.5 Abnormal   >60.0  54.4 Abnormal   54.4 Abnormal   >60.0  >60.0  >60.0    Anion Gap 8 - 16 mmol/L 12  6 Low   7 Low   8  9  7 Low   10         Assessment and Plan     1. HCC (hepatocellular carcinoma)  -     MRI Abdomen W WO Contrast; Future; Expected date: 07/18/2023        Reviewed MRI with Dr. Her. Explained to patient MRI shows good response to  treatment. However, MRI is   not of good quality. Recommendation is to repeat MRI in 3 months at [facility]. Patient  verbalized understanding and agreement. MRI scheduled for 10/18/2023.  Clinic phone number provided.

## 2023-07-24 DIAGNOSIS — I48.19 PERSISTENT ATRIAL FIBRILLATION: ICD-10-CM

## 2023-07-24 DIAGNOSIS — K70.30 ALCOHOLIC CIRRHOSIS OF LIVER WITHOUT ASCITES: ICD-10-CM

## 2023-07-24 RX ORDER — NADOLOL 20 MG/1
TABLET ORAL
Qty: 90 TABLET | Refills: 1 | Status: SHIPPED | OUTPATIENT
Start: 2023-07-24 | End: 2023-10-31 | Stop reason: SDUPTHER

## 2023-08-16 ENCOUNTER — TELEPHONE (OUTPATIENT)
Dept: ENDOSCOPY | Facility: HOSPITAL | Age: 68
End: 2023-08-16

## 2023-08-16 DIAGNOSIS — Z12.11 COLON CANCER SCREENING: Primary | ICD-10-CM

## 2023-08-16 NOTE — TELEPHONE ENCOUNTER
Attempted to contact the patient to schedule an endoscopy procedure(s) colonoscopy. The patient did not answer the call and left a voice message requesting a call back.

## 2023-10-18 ENCOUNTER — HOSPITAL ENCOUNTER (OUTPATIENT)
Dept: RADIOLOGY | Facility: HOSPITAL | Age: 68
Discharge: HOME | End: 2023-10-18
Attending: FAMILY MEDICINE
Payer: COMMERCIAL

## 2023-10-18 DIAGNOSIS — C22.0 HCC (HEPATOCELLULAR CARCINOMA): ICD-10-CM

## 2023-10-18 PROCEDURE — 25500020 PHARM REV CODE 255: Performed by: FAMILY MEDICINE

## 2023-10-18 PROCEDURE — A9585 GADOBUTROL INJECTION: HCPCS | Performed by: FAMILY MEDICINE

## 2023-10-18 PROCEDURE — 74183 MRI ABD W/O CNTR FLWD CNTR: CPT | Mod: 26,,, | Performed by: RADIOLOGY

## 2023-10-18 PROCEDURE — 74183 MRI ABDOMEN W WO CONTRAST: ICD-10-PCS | Mod: 26,,, | Performed by: RADIOLOGY

## 2023-10-18 PROCEDURE — 74183 MRI ABD W/O CNTR FLWD CNTR: CPT | Mod: TC

## 2023-10-18 RX ORDER — GADOBUTROL 604.72 MG/ML
10 INJECTION INTRAVENOUS
Status: COMPLETED | OUTPATIENT
Start: 2023-10-18 | End: 2023-10-18

## 2023-10-18 RX ADMIN — GADOBUTROL 10 ML: 604.72 INJECTION INTRAVENOUS at 01:10

## 2023-10-24 ENCOUNTER — OFFICE VISIT (OUTPATIENT)
Dept: INTERVENTIONAL RADIOLOGY/VASCULAR | Facility: CLINIC | Age: 68
End: 2023-10-24
Payer: COMMERCIAL

## 2023-10-24 VITALS
DIASTOLIC BLOOD PRESSURE: 83 MMHG | WEIGHT: 288.94 LBS | HEIGHT: 73 IN | SYSTOLIC BLOOD PRESSURE: 142 MMHG | BODY MASS INDEX: 38.29 KG/M2 | HEART RATE: 81 BPM

## 2023-10-24 DIAGNOSIS — C22.0 HCC (HEPATOCELLULAR CARCINOMA): Primary | ICD-10-CM

## 2023-10-24 PROCEDURE — 3079F PR MOST RECENT DIASTOLIC BLOOD PRESSURE 80-89 MM HG: ICD-10-PCS | Mod: CPTII,S$GLB,, | Performed by: FAMILY MEDICINE

## 2023-10-24 PROCEDURE — 99999 PR PBB SHADOW E&M-EST. PATIENT-LVL III: ICD-10-PCS | Mod: PBBFAC,,, | Performed by: FAMILY MEDICINE

## 2023-10-24 PROCEDURE — 3079F DIAST BP 80-89 MM HG: CPT | Mod: CPTII,S$GLB,, | Performed by: FAMILY MEDICINE

## 2023-10-24 PROCEDURE — 1159F MED LIST DOCD IN RCRD: CPT | Mod: CPTII,S$GLB,, | Performed by: FAMILY MEDICINE

## 2023-10-24 PROCEDURE — 1159F PR MEDICATION LIST DOCUMENTED IN MEDICAL RECORD: ICD-10-PCS | Mod: CPTII,S$GLB,, | Performed by: FAMILY MEDICINE

## 2023-10-24 PROCEDURE — 1160F PR REVIEW ALL MEDS BY PRESCRIBER/CLIN PHARMACIST DOCUMENTED: ICD-10-PCS | Mod: CPTII,S$GLB,, | Performed by: FAMILY MEDICINE

## 2023-10-24 PROCEDURE — 3077F PR MOST RECENT SYSTOLIC BLOOD PRESSURE >= 140 MM HG: ICD-10-PCS | Mod: CPTII,S$GLB,, | Performed by: FAMILY MEDICINE

## 2023-10-24 PROCEDURE — 99213 OFFICE O/P EST LOW 20 MIN: CPT | Mod: S$GLB,,, | Performed by: FAMILY MEDICINE

## 2023-10-24 PROCEDURE — 3008F PR BODY MASS INDEX (BMI) DOCUMENTED: ICD-10-PCS | Mod: CPTII,S$GLB,, | Performed by: FAMILY MEDICINE

## 2023-10-24 PROCEDURE — 1160F RVW MEDS BY RX/DR IN RCRD: CPT | Mod: CPTII,S$GLB,, | Performed by: FAMILY MEDICINE

## 2023-10-24 PROCEDURE — 3077F SYST BP >= 140 MM HG: CPT | Mod: CPTII,S$GLB,, | Performed by: FAMILY MEDICINE

## 2023-10-24 PROCEDURE — 99213 PR OFFICE/OUTPT VISIT, EST, LEVL III, 20-29 MIN: ICD-10-PCS | Mod: S$GLB,,, | Performed by: FAMILY MEDICINE

## 2023-10-24 PROCEDURE — 99999 PR PBB SHADOW E&M-EST. PATIENT-LVL III: CPT | Mod: PBBFAC,,, | Performed by: FAMILY MEDICINE

## 2023-10-24 PROCEDURE — 3008F BODY MASS INDEX DOCD: CPT | Mod: CPTII,S$GLB,, | Performed by: FAMILY MEDICINE

## 2023-10-24 NOTE — PROGRESS NOTES
Subjective     Patient ID: Juan Carlos Yoo Sr. is a 68 y.o. male.    Chief Complaint: Hepatocellular Carcinoma    Patient here for follow up of hepatocellular carcinoma treated with radioembolization on  2023. Patient has a history of decompensated cirrhosis, hepatic encephalopathy, and esophageal varices w/o bleeding. He reports feeling well. He denies any abdominal distention or abdominal pain. He was last seen in IR clinic on 2023 after follow up MRI was obtained. Recommendation was to repeat MRI in 3 months here at [facility] due to poor quality of MRI obtained on 2023. Repeat MRI and labs obtained on 10/18/2023.      Review of Systems   Constitutional:  Positive for appetite change (increased). Negative for activity change, chills, fatigue and fever.   Respiratory:  Negative for cough, shortness of breath, wheezing and stridor.    Cardiovascular:  Negative for chest pain, palpitations and leg swelling.   Gastrointestinal:  Negative for abdominal distention, abdominal pain, constipation, diarrhea, nausea and vomiting.          Objective     Physical Exam  Constitutional:       General: He is not in acute distress.     Appearance: He is well-developed. He is not diaphoretic.   HENT:      Head: Normocephalic and atraumatic.   Pulmonary:      Effort: Pulmonary effort is normal. No respiratory distress.   Neurological:      Mental Status: He is alert and oriented to person, place, and time.   Psychiatric:         Behavior: Behavior normal.         Thought Content: Thought content normal.         Judgment: Judgment normal.     Reviewed hepatology progress note    ECO  MELD 3.0: 12 at 10/18/2023  1:56 PM  MELD-Na: 11 at 10/18/2023  1:56 PM  Calculated from:  Serum Creatinine: 1.2 mg/dL at 10/18/2023  1:56 PM  Serum Sodium: 138 mmol/L (Using max of 137 mmol/L) at 10/18/2023  1:56 PM  Total Bilirubin: 1.4 mg/dL at 10/18/2023  1:56 PM  Serum Albumin: 3.1 g/dL at 10/18/2023  1:56 PM  INR(ratio): 1.2 at  10/18/2023  1:56 PM  Age at listing (hypothetical): 68 years  Sex: Male at 10/18/2023  1:56 PM  Child Sun: Class A  Transplant Status: declined due to high risk from central obesity, BMI 38, chronic A Fib, multiple positive PETH tests    MRI 10/18/2023  Impression:     Stable post treatment changes of radioembolization for hepatocellular carcinoma.  No evidence of local recurrence.     Indeterminate subcentimeter focus of arterial enhancement in the right hepatic lobe without washout or pseudo capsule.     Cirrhotic liver morphology     Assessment and Plan     1. HCC (hepatocellular carcinoma)        Reviewed MRI with Dr. Her. Explained to patient MRI shows good response to treatment without residual or recurrence. MRI also noted indeterminate lesion. Recommendation is to continue surveillance with repeat MRI in 3 months with hepatology. Patient verbalized understanding and agreement. He tells me he has an appointment with Dr. Cota on Thursday. Encouraged patient to keep this appointment. RTC PRN

## 2023-10-26 ENCOUNTER — OFFICE VISIT (OUTPATIENT)
Dept: HEPATOLOGY | Facility: CLINIC | Age: 68
End: 2023-10-26
Payer: COMMERCIAL

## 2023-10-26 DIAGNOSIS — K70.31 ALCOHOLIC CIRRHOSIS OF LIVER WITH ASCITES: Primary | ICD-10-CM

## 2023-10-26 PROCEDURE — 99499 NO LOS: ICD-10-PCS | Mod: S$GLB,,, | Performed by: INTERNAL MEDICINE

## 2023-10-26 PROCEDURE — 99499 UNLISTED E&M SERVICE: CPT | Mod: S$GLB,,, | Performed by: INTERNAL MEDICINE

## 2023-10-26 PROCEDURE — 99999 PR PBB SHADOW E&M-EST. PATIENT-LVL I: CPT | Mod: PBBFAC,,, | Performed by: INTERNAL MEDICINE

## 2023-10-26 PROCEDURE — 99999 PR PBB SHADOW E&M-EST. PATIENT-LVL I: ICD-10-PCS | Mod: PBBFAC,,, | Performed by: INTERNAL MEDICINE

## 2023-10-26 NOTE — PATIENT INSTRUCTIONS
Recommendations:  -  MRI w wo contrast in 3 months (January 18/2024).  -  Labs every 3 months:  CBC, CMP, PT INR, AFP on Jan 18/2024.  -  Present to IR after next MRI available.  -  Cardiology appt (heart failure) for pulm hypertension. Patient to check with cardiology about holding Eliquis in prep for dental implant.   -  Low salt in the diet, avoid canned, bottled and processed foods.  -  Continue current meds  -  Avoid alcohol, smoking, sedatives and meds with codeine.  -  Use sliding scale for lactulose as follows:  Take 1 cup 30 mL at 8AM.  If no stool by 12 noon, take 30 mL more of lactulose.  If <2 stools by 3 PM, take 30 mL more of lactulose.  Goal is 3-4 soft stools daily.   -  Avoid high intake of Tylenol (more than 4 extra-strength pills in one day)  -  Call us if any bleeding, fevers, confusion, disorientation occur  -  Endoscopies: to be done here in GI.   -  Return in approx 3 months.

## 2023-10-26 NOTE — PROGRESS NOTES
"   [facility] Follow-up Note      This is an audio visit.      Reason for Visit:  Follow-up of cirrhosis    PCP: Nyla Rios   No address on file    HPI:  This is a 68 y.o. male here for follow-up of: cirrhosis.    Patient doing well.  Has gained some weight, not fluid. His stomach growls between meals, like he is hungry.  Right Knee hurts when he stands up, pain goes up towards his gluteal muscles.  The longer he stands up, the worse the pain.  He is due to see his PCP next Monday 10/30/23.      His abdomen has no pain, no swelling of his feet/ankles.     MRI abd 10/18/23:  -  Stable post treatment changes of radioembolization (4.3 x 3.6 cm ablation cavity) for hepatocellular carcinoma.  No evidence of local recurrence.  -  Indeterminate subcentimeter focus of arterial enhancement in the right hepatic lobe without washout or pseudo capsule.  -  Cirrhotic liver morphology.    Will get CBC, CMP, PT INR, AFP, MRI w wo contrast in 3 months (January 18/2024).     Creatinine, LFTs normal,   T bili 1.4.       Meds:   Spirono 50/d, lasix 40/d, nadolol 40 mg HS, eliquis 5 mg BID for AFib (check with cardiologist what to do when needs dental implant), proronix 40 mg/d, amlodipine 10/d, tamsulosin 0.4 mg/d.      Continue above meds. Excpet in prep for dental implant will need to be off eliquis, patient will check with cardiologist - maybe lovenox bridge.     Interval History: 4/2023   Patient completed transplant eval and was presented to selection committee, however, he was declined due to persistent positive PETH tests, high BMI. Committee recommended having liver lesion treated.  Has appointment with IR on June 6, 2023 to have mapping for the lesion to be treated with Y-90.       Today, Mr. Yoo states he has lost 5 lbs.  Balance is "not so good".      Decomp cirrhosis with fatigue, hepatic encephalopathy, thrombocytopenia, eso varices wo bleeding.  EGD and colonoscopy were supposed to be done 2 months " ago, he was prepped, and anesthesia did not want to go forward, he was sent to ECHO: A Fib cardioverted 3 times did not stop the arrhythmia, but in the ER he got a shot, and heart converted to sinus rhythm, no more A Fib since.     ECHO 6/15/22:   The left ventricle is normal in size with normal systolic function.  The estimated ejection fraction is 55-60%.  The quantitatively derived ejection fraction is 56%.  A diastolic pattern consistent with atrial fibrillation observed.  Severe left atrial enlargement.  Mild right ventricular enlargement with normal right ventricular systolic function.  Severe right atrial enlargement.  Mild aortic regurgitation.  Mild mitral regurgitation.  The estimated PA systolic pressure is 53 mmHg.  There is moderate pulmonary hypertension.  Mild tricuspid regurgitation.  Intermediate central venous pressure (8 mmHg).     Repeat ECHO normal.   Cardiac cath also minimal vasc abnormality, no signific coronary obstruction.     Liver lesion 1.3 cm, indeterminate, possible HCC.     OCTAVIA Miranda note 10/6/22:  He has mentioned drinking heavily several weeks before the blood work due to rectal pressure, but he needs to be followed by transplant at this point due to the severity of his liver disease.     MELD was 23 on 9/29/22.  17 today.      MELD 3.0: 12 at 10/18/2023  1:56 PM  MELD-Na: 11 at 10/18/2023  1:56 PM  Calculated from:  Serum Creatinine: 1.2 mg/dL at 10/18/2023  1:56 PM  Serum Sodium: 138 mmol/L (Using max of 137 mmol/L) at 10/18/2023  1:56 PM  Total Bilirubin: 1.4 mg/dL at 10/18/2023  1:56 PM  Serum Albumin: 3.1 g/dL at 10/18/2023  1:56 PM  INR(ratio): 1.2 at 10/18/2023  1:56 PM  Age at listing (hypothetical): 68 years  Sex: Male at 10/18/2023  1:56 PM       US abd 7/1/22:  The liver is normal in size measuring 14.2 cm in length. The liver is heterogeneous in echogenicity and nodular in contour compatible with cirrhosis.  No focal hepatic lesions are identified.  The main portal  vein appears patent with antegrade flow.  No intrahepatic biliary ductal dilatation is detected. The common bile duct is within normal limits at 0.4 cm.  The gallbladder is absent.  The right kidney is normal in size measuring 12.2 cm with no evidence of hydronephrosis.  A possible 0.7 cm nonobstructing right renal calculus is noted in the superior pole.  A 0.9 cm cyst is noted in the superior pole of the right kidney.  The spleen is enlarged measuring 13.1 x 5.5 cm.     Impression:   1. Heterogeneous nodular liver compatible with cirrhosis.  2. Cholecystectomy.  3. Splenomegaly.  4. Possible 0.7 cm nonobstructing right renal calculus.  5. Right renal cyst.      Elevated liver enzymes: Yes  Abnormal imaging: Yes  Cirrhosis: Yes  Hepatitis C: No  Hepatitis B: No  Fatty liver: No  Encephalopathy: Yes  Post-hospital discharge: No  Symptoms: Mild swelling in the legs.      Primary hepatic manifestations:  Fatigue:Yes  Edema:Yes  Ascites:Yes - para x 2 in the last year.  Encephalopathy:Yes  Abdominal pain:No  GI bleeds: No  Pruritus:No  Weight Changes:No  Changes in Bowel habits: No  Muscle cramps:No    Risk factors for liver disease:  No jaundice  No transfusions  No IVDU  Did not snort cocaine or similar agents  Did not live with anyone with hepatitis B or C  Sexual partner not tested  No hepatotoxic medications  No exposure to industrial toxins  Alcohol:       ROS:  Constitutional: No fevers, chills, weight changes, fatigue  ENT: No allergies, nosebleeds,   CV: No chest pain  Pulm: No cough, shortness of breath  Ophtho: No vision changes  GI/Liver: see HPI  Derm: No rash, itching  Heme: No swollen glands, bruising  MSK: No joint pains, joint swelling  : No dysuria, hematuria, decrease in urine output  Endo: No hot or cold intolerance  Neuro: No confusion, disorientation, difficulty with sleep, memory, concentration, syncope, seizure  Psych: No anxiety, depression    Medical History:  has a past medical history of  Atrial fibrillation, Bulging disc, Cirrhosis, Colon polyp (12/29/2017), EV (esophageal varices), Hypertension (3/30/2023), Skin cancer (03/2017), and Thrombocytopenia.    Surgical History:  has a past surgical history that includes Cholecystectomy; Tonsillectomy; Vasectomy; Skin biopsy (03/2017); Hernia repair; Upper gastrointestinal endoscopy (2011); Upper gastrointestinal endoscopy (2016); Colonoscopy; Colonoscopy (N/A, 12/29/2017); Catheterization of both left and right heart (N/A, 2/8/2023); and Coronary angiography (N/A, 2/8/2023).    Family History: family history includes Cancer in his maternal uncle; Heart disease in his maternal uncle; Hyperlipidemia in his brother; Ovarian cancer in his sister..     Social History:  reports that he has never smoked. He has never used smokeless tobacco. He reports that he does not currently use alcohol. He reports that he does not use drugs.    Review of patient's allergies indicates:  No Known Allergies    Current Outpatient Rx   Medication Sig Dispense Refill    amLODIPine (NORVASC) 10 MG tablet Take 10 mg by mouth once daily.      apixaban (ELIQUIS) 5 mg Tab Take 1 tablet (5 mg total) by mouth 2 (two) times daily. 60 tablet 11    ascorbic acid, vitamin C, (VITAMIN C) 500 MG tablet Take 500 mg by mouth once daily.      cyanocobalamin (VITAMIN B-12) 100 MCG tablet Take 100 mcg by mouth once daily.      fish oil-omega-3 fatty acids 300-1,000 mg capsule Take 1 g by mouth 2 (two) times daily.      furosemide (LASIX) 40 MG tablet Take 1 tablet (40 mg total) by mouth 2 (two) times a day. 90 tablet 3    nadoloL (CORGARD) 20 MG tablet TAKE 2 TABLETS BY MOUTH EVERY EVENING 90 tablet 1    pantoprazole (PROTONIX) 40 MG tablet Take 1 tablet (40 mg total) by mouth once daily. 90 tablet 3    spironolactone (ALDACTONE) 50 MG tablet Take 1 tablet (50 mg total) by mouth once daily. 90 tablet 2    tamsulosin (FLOMAX) 0.4 mg Cap Take 1 capsule (0.4 mg total) by mouth every evening. 30  capsule 6       Objective Findings:    Vital Signs:  There were no vitals taken for this visit.  There is no height or weight on file to calculate BMI.    Physical Exam:  General Appearance: Well appearing in no acute distress  Head:   Normocephalic, without obvious abnormality  Eyes:    No scleral icterus, EOMI  ENT: Neck supple, Lips, mucosa, and tongue normal; teeth and gums normal  Lungs: CTA bilaterally in anterior and posterior fields, no wheezes, no crackles.  Heart:  Regular rate and rhythm, S1, S2 normal, no murmurs heard  Abdomen: Soft, non tender, non distended with positive bowel sounds in all four quadrants. No hepatosplenomegaly, ascites, or mass  Extremities: 2+ pulses, no clubbing, cyanosis or edema  Skin: No rash  Neurologic: CN II-XII intact, alert, oriented x 3. No asterixis      Labs:  Lab Results   Component Value Date    WBC 5.60 10/18/2023    HGB 13.3 (L) 10/18/2023    HCT 40.8 10/18/2023    PLT 91 (L) 10/18/2023    CHOL 178 04/01/2022    TRIG 73 04/01/2022    HDL 22 (L) 04/01/2022    INR 1.2 10/18/2023    CREATININE 1.2 10/18/2023    BUN 12 10/18/2023    BILITOT 1.4 (H) 10/18/2023    ALT 11 10/18/2023    AST 22 10/18/2023    ALKPHOS 49 (L) 10/18/2023     10/18/2023    K 3.4 (L) 10/18/2023     10/18/2023    CO2 27 10/18/2023    TSH 2.208 12/13/2022    PSA 0.18 09/29/2022    HGBA1C 4.5 12/13/2022    AFP 5.3 10/18/2023     MELD 3.0: 12 at 10/18/2023  1:56 PM  MELD-Na: 11 at 10/18/2023  1:56 PM  Calculated from:  Serum Creatinine: 1.2 mg/dL at 10/18/2023  1:56 PM  Serum Sodium: 138 mmol/L (Using max of 137 mmol/L) at 10/18/2023  1:56 PM  Total Bilirubin: 1.4 mg/dL at 10/18/2023  1:56 PM  Serum Albumin: 3.1 g/dL at 10/18/2023  1:56 PM  INR(ratio): 1.2 at 10/18/2023  1:56 PM  Age at listing (hypothetical): 68 years  Sex: Male at 10/18/2023  1:56 PM       Imaging:       Endoscopy:      Assessment:  No diagnosis found.    Decomp. Alcohol related cirrhosis, with HE, ascites (controlled),  edema (better now), mild icterus, moderate Pulm Hypertension,  improving with MELD 15 today 5/15/23  AFP yee.l. Creat normal.   First ECHO with pulm HTN due to fluid overload, but after diuresis PASP was normal.   Liver imaging positive for HCC: Liver lesion 1.3 cm, indeterminate, likely HCC.  Y-90 treatment on 6/6/23.     MRI abd 10/18/23:  -  Stable post treatment changes of radioembolization (4.3 x 3.6 cm ablation cavity) for hepatocellular carcinoma.  No evidence of local recurrence.  -  Indeterminate subcentimeter focus of arterial enhancement in the right hepatic lobe without washout or pseudo capsule.  -  Cirrhotic liver morphology.    Will get CBC, CMP, PT INR, AFP, MRI w wo contrast in 3 months (January 18/2024).     Creatinine, LFTs normal,   T bili 1.4.       Meds:   Spirono 50/d, lasix 40/d, nadolol 40 mg HS, eliquis 5 mg BID for AFib (check with cardiologist what to do when needs dental implant), proronix 40 mg/d, amlodipine 10/d, tamsulosin 0.4 mg/d.      Continue above meds. Except in prep for dental implant will need to be off eliquis, patient will check with cardiologist - maybe lovenox bridge.      Recommendations:  -  MRI w wo contrast in 3 months (January 18/2024).  -  Labs every 3 months:  CBC, CMP, PT INR, AFP on Jan 18/2024.  -  Present to IR after next MRI available.  -  Cardiology appt (heart failure) for pulm hypertension. Patient to check with cardiology about holding Eliquis in prep for dental implant.   -  Low salt in the diet, avoid canned, bottled and processed foods.  -  Continue current meds  -  Avoid alcohol, smoking, sedatives and meds with codeine.  -  Use sliding scale for lactulose as follows:  Take 1 cup 30 mL at 8AM.  If no stool by 12 noon, take 30 mL more of lactulose.  If <2 stools by 3 PM, take 30 mL more of lactulose.  Goal is 3-4 soft stools daily.   -  Avoid high intake of Tylenol (more than 4 extra-strength pills in one day)  -  Call us if any bleeding, fevers,  confusion, disorientation occur  -  Endoscopies: to be done here in GI.   -  Return in approx 3 months.         No follow-ups on file.      Order summary:  No orders of the defined types were placed in this encounter.        Thank you so much for allowing me to participate in the care of Juan Carlos Cota MD

## 2023-10-27 ENCOUNTER — TELEPHONE (OUTPATIENT)
Dept: HEPATOLOGY | Facility: CLINIC | Age: 68
End: 2023-10-27
Payer: COMMERCIAL

## 2023-10-27 DIAGNOSIS — K70.31 ALCOHOLIC CIRRHOSIS OF LIVER WITH ASCITES: Primary | ICD-10-CM

## 2023-10-30 DIAGNOSIS — K70.30 ALCOHOLIC CIRRHOSIS OF LIVER WITHOUT ASCITES: ICD-10-CM

## 2023-10-30 DIAGNOSIS — I48.19 PERSISTENT ATRIAL FIBRILLATION: ICD-10-CM

## 2023-10-31 RX ORDER — NADOLOL 20 MG/1
TABLET ORAL
Qty: 180 TABLET | Refills: 3 | Status: SHIPPED | OUTPATIENT
Start: 2023-10-31

## 2023-11-10 ENCOUNTER — PATIENT OUTREACH (OUTPATIENT)
Dept: ADMINISTRATIVE | Facility: HOSPITAL | Age: 68
End: 2023-11-10
Payer: COMMERCIAL

## 2023-12-18 ENCOUNTER — PATIENT MESSAGE (OUTPATIENT)
Dept: ADMINISTRATIVE | Facility: HOSPITAL | Age: 68
End: 2023-12-18
Payer: COMMERCIAL

## 2024-01-28 NOTE — PROGRESS NOTES
"   [facility] Follow-up Note      This is an audio visit.      Reason for Visit:  Follow-up of cirrhosis    PCP: Nyla Rios       HPI:  This is a 68 y.o. male here for follow-up of: decompensated cirrhosis.    Patient states he is not doing well.  Has slowly gained approx 10 lb between May and Oct [address]3650, but then escalated 30 lb (I seriously doubt it) in 2.5 months. Of which, 27 lb lost in 20 days.  These weights may not be accurate.  His left upper extremity is generally bigger than right arm.  Both breasts are enlarged, most likely due to spironolactone.- will switch to amiloride  will recommending d/C amlodipine.     His abdomen has no pain, no swelling of his feet/ankles.     MRI abd 10/18/23:  -  Stable post treatment changes of radioembolization (4.3 x 3.6 cm ablation cavity) for hepatocellular carcinoma.  No evidence of local recurrence.  -  Indeterminate subcentimeter focus of arterial enhancement in the right hepatic lobe without washout or pseudo capsule.  -  Cirrhotic liver morphology.    Will get CBC, CMP, PT INR, AFP, MRI w wo contrast in 3 months (January 18/2024).     Creatinine, LFTs normal,   T bili 1.4.       Meds:   Spirono 50/d, lasix 40/d, nadolol 40 mg HS, eliquis 5 mg BID for AFib (check with cardiologist what to do when needs dental implant), proronix 40 mg/d, amlodipine 10/d, tamsulosin 0.4 mg/d.      Continue above meds. Excpet in prep for dental implant will need to be off eliquis, patient will check with cardiologist - maybe lovenox bridge.     Interval History: 4/2023   Patient completed transplant eval and was presented to selection committee, however, he was declined due to persistent positive PETH tests, high BMI. Committee recommended having liver lesion treated.  Has appointment with IR on June 6, 2023 to have mapping for the lesion to be treated with Y-90.       Today, Mr. Yoo states he has lost 5 lbs.  Balance is "not so good".      Decomp cirrhosis with " fatigue, hepatic encephalopathy, thrombocytopenia, eso varices wo bleeding.  EGD and colonoscopy were supposed to be done 2 months ago, he was prepped, and anesthesia did not want to go forward, he was sent to ECHO: A Fib cardioverted 3 times did not stop the arrhythmia, but in the ER he got a shot, and heart converted to sinus rhythm, no more A Fib since.     ECHO 6/15/22:   The left ventricle is normal in size with normal systolic function.  The estimated ejection fraction is 55-60%.  The quantitatively derived ejection fraction is 56%.  A diastolic pattern consistent with atrial fibrillation observed.  Severe left atrial enlargement.  Mild right ventricular enlargement with normal right ventricular systolic function.  Severe right atrial enlargement.  Mild aortic regurgitation.  Mild mitral regurgitation.  The estimated PA systolic pressure is 53 mmHg.  There is moderate pulmonary hypertension.  Mild tricuspid regurgitation.  Intermediate central venous pressure (8 mmHg).     Repeat ECHO normal.   Cardiac cath also minimal vasc abnormality, no signific coronary obstruction.     Liver lesion 1.3 cm, indeterminate, possible HCC.     OCTAVIA Miranda note 10/6/22:  He has mentioned drinking heavily several weeks before the blood work due to rectal pressure, but he needs to be followed by transplant at this point due to the severity of his liver disease.     MELD was 23 on 9/29/22.  17 today.      MELD 3.0: 17 at 1/29/2024 10:08 AM  MELD-Na: 16 at 1/29/2024 10:08 AM  Calculated from:  Serum Creatinine: 1.2 mg/dL at 1/29/2024 10:08 AM  Serum Sodium: 138 mmol/L (Using max of 137 mmol/L) at 1/29/2024 10:08 AM  Total Bilirubin: 2.7 mg/dL at 1/29/2024 10:08 AM  Serum Albumin: 3.0 g/dL at 1/29/2024 10:08 AM  INR(ratio): 1.5 at 1/29/2024 10:08 AM  Age at listing (hypothetical): 68 years  Sex: Male at 1/29/2024 10:08 AM       US abd 7/1/22:  The liver is normal in size measuring 14.2 cm in length. The liver is heterogeneous in  echogenicity and nodular in contour compatible with cirrhosis.  No focal hepatic lesions are identified.  The main portal vein appears patent with antegrade flow.  No intrahepatic biliary ductal dilatation is detected. The common bile duct is within normal limits at 0.4 cm.  The gallbladder is absent.  The right kidney is normal in size measuring 12.2 cm with no evidence of hydronephrosis.  A possible 0.7 cm nonobstructing right renal calculus is noted in the superior pole.  A 0.9 cm cyst is noted in the superior pole of the right kidney.  The spleen is enlarged measuring 13.1 x 5.5 cm.     Impression:   1. Heterogeneous nodular liver compatible with cirrhosis.  2. Cholecystectomy.  3. Splenomegaly.  4. Possible 0.7 cm nonobstructing right renal calculus.  5. Right renal cyst.      Elevated liver enzymes: Yes  Abnormal imaging: Yes  Cirrhosis: Yes  Hepatitis C: No  Hepatitis B: No  Fatty liver: No  Encephalopathy: Yes  Post-hospital discharge: No  Symptoms: Mild swelling in the legs.      Primary hepatic manifestations:  Fatigue:Yes  Edema:Yes  Ascites:Yes - para x 2 in the last year.  Encephalopathy:Yes  Abdominal pain:No  GI bleeds: No  Pruritus:No  Weight Changes:No  Changes in Bowel habits: No  Muscle cramps:No    Risk factors for liver disease:  No jaundice  No transfusions  No IVDU  Did not snort cocaine or similar agents  Did not live with anyone with hepatitis B or C  Sexual partner not tested  No hepatotoxic medications  No exposure to industrial toxins  Alcohol: not drinking over for a month      ROS:  Constitutional: No fevers, chills, weight changes, fatigue  ENT: No allergies, nosebleeds,   CV: No chest pain  Pulm: No cough, shortness of breath  Ophtho: No vision changes  GI/Liver: see HPI  Derm: No rash, itching  Heme: No swollen glands, bruising  MSK: No joint pains, joint swelling  : No dysuria, hematuria, decrease in urine output  Endo: No hot or cold intolerance  Neuro: No confusion,  disorientation, difficulty with sleep, memory, concentration, syncope, seizure  Psych: No anxiety, depression    Medical History:  has a past medical history of Atrial fibrillation, Bulging disc, Cirrhosis, Colon polyp (12/29/2017), EV (esophageal varices), Hypertension (3/30/2023), Skin cancer (03/2017), and Thrombocytopenia.    Surgical History:  has a past surgical history that includes Cholecystectomy; Tonsillectomy; Vasectomy; Skin biopsy (03/2017); Hernia repair; Upper gastrointestinal endoscopy (2011); Upper gastrointestinal endoscopy (2016); Colonoscopy; Colonoscopy (N/A, 12/29/2017); Catheterization of both left and right heart (N/A, 2/8/2023); and Coronary angiography (N/A, 2/8/2023).    Family History: family history includes Cancer in his maternal uncle; Heart disease in his maternal uncle; Hyperlipidemia in his brother; Ovarian cancer in his sister..     Social History:  reports that he has never smoked. He has never used smokeless tobacco. He reports that he does not currently use alcohol. He reports that he does not use drugs.    Review of patient's allergies indicates:  No Known Allergies    Current Outpatient Rx   Medication Sig Dispense Refill    amLODIPine (NORVASC) 10 MG tablet Take 1 tablet (10 mg total) by mouth once daily. 90 tablet 3    apixaban (ELIQUIS) 5 mg Tab Take 1 tablet (5 mg total) by mouth 2 (two) times daily. 180 tablet 3    ascorbic acid, vitamin C, (VITAMIN C) 500 MG tablet Take 500 mg by mouth once daily.      cyanocobalamin (VITAMIN B-12) 100 MCG tablet Take 100 mcg by mouth once daily.      fish oil-omega-3 fatty acids 300-1,000 mg capsule Take 1 g by mouth 2 (two) times daily.      furosemide (LASIX) 40 MG tablet Take 1 tablet (40 mg total) by mouth 2 (two) times a day. 180 tablet 3    furosemide (LASIX) 40 MG tablet Take 1 tablet (40 mg total) by mouth 2 (two) times a day. 4 tablet 0    nadoloL (CORGARD) 20 MG tablet TAKE 2 TABLETS BY MOUTH EVERY EVENING 180 tablet 3     "pantoprazole (PROTONIX) 40 MG tablet Take 1 tablet (40 mg total) by mouth once daily. 90 tablet 3    tamsulosin (FLOMAX) 0.4 mg Cap Take 1 capsule (0.4 mg total) by mouth every evening. 30 capsule 6    aMILoride (MIDAMOR) 5 MG Tab Take 2 tablets (10 mg total) by mouth once daily. Start with 5 mg (1 tablet) daily, increase to 10 mg (2 tablets) daily if not losing 3 pounds a week. 60 tablet 11       Objective Findings:    Vital Signs:  BP (!) 143/78 (BP Location: Right arm, Patient Position: Sitting, BP Method: Large (Automatic))   Pulse 81   Temp 97.1 °F (36.2 °C) (Oral)   Resp 18   Ht 6' 1" (1.854 m)   Wt (!) 137 kg (302 lb 0.5 oz)   SpO2 98%   BMI 39.85 kg/m²   Body mass index is 39.85 kg/m².    Physical Exam:  General Appearance: Well appearing in no acute distress  Head:   Normocephalic, without obvious abnormality  Eyes:    No scleral icterus, EOMI  ENT: Neck supple, Lips, mucosa, and tongue normal; teeth and gums normal  Lungs: CTA bilaterally in anterior and posterior fields, no wheezes, no crackles.  Heart:  Regular rate and rhythm, S1, S2 normal, no murmurs heard  Abdomen: Soft, non tender, non distended with positive bowel sounds in all four quadrants. No hepatosplenomegaly, ascites, or mass  Extremities: 2+ pulses, no clubbing, cyanosis or edema  Skin: No rash  Neurologic: CN II-XII intact, alert, oriented x 3. No asterixis      Labs:  Lab Results   Component Value Date    WBC 4.74 01/29/2024    HGB 10.3 (L) 01/29/2024    HCT 32.7 (L) 01/29/2024     (L) 01/29/2024    CHOL 120 01/29/2024    TRIG 50 01/29/2024    HDL 19 (L) 01/29/2024    INR 1.5 (H) 01/29/2024    CREATININE 1.2 01/29/2024    BUN 10 01/29/2024    BILITOT 2.7 (H) 01/29/2024    ALT 24 01/29/2024    AST 45 (H) 01/29/2024    ALKPHOS 55 01/29/2024     01/29/2024    K 3.1 (L) 01/29/2024    CL 98 01/29/2024    CO2 28 01/29/2024    TSH 2.208 12/13/2022    PSA 0.18 09/29/2022    HGBA1C 4.5 12/13/2022    AFP 7.3 01/29/2024     MELD " 3.0: 17 at 1/29/2024 10:08 AM  MELD-Na: 16 at 1/29/2024 10:08 AM  Calculated from:  Serum Creatinine: 1.2 mg/dL at 1/29/2024 10:08 AM  Serum Sodium: 138 mmol/L (Using max of 137 mmol/L) at 1/29/2024 10:08 AM  Total Bilirubin: 2.7 mg/dL at 1/29/2024 10:08 AM  Serum Albumin: 3.0 g/dL at 1/29/2024 10:08 AM  INR(ratio): 1.5 at 1/29/2024 10:08 AM  Age at listing (hypothetical): 68 years  Sex: Male at 1/29/2024 10:08 AM       Imaging:       Endoscopy:      Assessment:  1. Fluid retention        Decomp. Alcohol related cirrhosis, with HE, ascites (present), edema (worse now), mild icterus, moderate Pulm Hypertension,  improving with MELD 17 today 1/31/24.  AFP normal. Creat normal.   -  Will stop spironolactone (due to gynecomastia), and   -  start amiloride (start with 5 mg daily, increase to 10 mg daily if <3 lb decrease in weight.    -  Stop amlodipine, (due to weight gain since starting it).  Inform PCP that amlodipine has been stopped. Have BP checked from PCP office   -  Schedule MRI abd w wo contrast week after [place], to be done at [facility].        First ECHO with pulm HTN due to fluid overload, but after diuresis PASP was normal.   Liver imaging positive for HCC: Liver lesion 1.3 cm, indeterminate, likely HCC.  Y-90 treatment on 6/8/23. 4-month f/u MRI was without recurrence or residual. Now, next 3-month MRI is ordered.      MRI abd 10/18/23:  -  Stable post treatment changes of radioembolization (4.3 x 3.6 cm ablation cavity) for hepatocellular carcinoma.  No evidence of local recurrence.  -  Indeterminate subcentimeter focus of arterial enhancement in the right hepatic lobe without washout or pseudo capsule.  -  Cirrhotic liver morphology.    Will get CBC, CMP, PT INR, AFP, MRI w wo contrast in 3 months (January 18/2024).     Creatinine, LFTs normal,   T bili 1.4.       Meds:   Spirono 50/d, lasix 40/d, nadolol 40 mg HS, eliquis 5 mg BID for AFib (check with cardiologist what to do when needs dental  implant), proronix 40 mg/d, amlodipine 10/d, tamsulosin 0.4 mg/d.      Continue above meds. Except in prep for dental implant will need to be off eliquis, patient will check with cardiologist - maybe lovenox bridge.      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    -  Will stop spironolactone (due to gynecomastia), and   -  start amiloride (start with 5 mg [email or web address], increase to 10 mg daily if <3 lb decrease in weight.    -  Stop amlodipine, (due to weight gain since starting it).   -  Schedule MRI abd w wo contrast week after [place], to be done at [facility].    -  Labs every 3 months:  CBC, CMP, PT INR, AFP on March 18, 2024.  -  Present to IR after next MRI available.  -  Cardiology appt (heart failure) for pulm hypertension. Patient to check with cardiology about holding Eliquis in prep for dental implant.   -  Low salt in the diet, avoid canned, bottled and processed foods.  -  Continue current meds  -  Avoid alcohol, smoking, sedatives and meds with codeine.  -  Use sliding scale for lactulose as follows:  Take 1 cup 30 mL at 8AM.  If no stool by 12 noon, take 30 mL more of lactulose.  If <2 stools by 3 PM, take 30 mL more of lactulose.  Goal is 3-4 soft stools daily.   -  Avoid high intake of Tylenol (more than 4 extra-strength pills in one day)  -  Call us if any bleeding, fevers, confusion, disorientation occur  -  Endoscopies: to be done here in GI.   -  Return in approx 3 months.         Follow up in about 3 months (around 4/30/2024).      Order summary:  No orders of the defined types were placed in this encounter.        Thank you so much for allowing me to participate in the care of Juan Carlos Cota MD

## 2024-01-30 ENCOUNTER — TELEPHONE (OUTPATIENT)
Dept: HEPATOLOGY | Facility: CLINIC | Age: 69
End: 2024-01-30
Payer: COMMERCIAL

## 2024-01-30 NOTE — TELEPHONE ENCOUNTER
----- Message from Savannah Lucio sent at 1/30/2024  2:26 PM CST -----  Regarding: keep appt tomorrow??  unable to get MRI done  Contact: PT  [phone]  The patient called requesting to speak to staff. PT states he was unable to complete the MRI due to his size. He is scheduled to see Dr. Cota tomorrow 1/31, but was not able to complete MRI. Does appt need to be r/s after MRI has been completed Please reach out to schedule MRI at main. Please reach out to patient to advise at your earliest opportunity regarding appt w/    No further information provided      Patient can be contacted @# [phone]

## 2024-01-30 NOTE — TELEPHONE ENCOUNTER
Advised pt via vm, appt will probably need to be reschd. Will speak with provider tomorrow. SYLVIA FRYE

## 2024-01-31 ENCOUNTER — OFFICE VISIT (OUTPATIENT)
Dept: HEPATOLOGY | Facility: CLINIC | Age: 69
End: 2024-01-31
Payer: COMMERCIAL

## 2024-01-31 ENCOUNTER — TELEPHONE (OUTPATIENT)
Dept: HEPATOLOGY | Facility: CLINIC | Age: 69
End: 2024-01-31
Payer: COMMERCIAL

## 2024-01-31 VITALS
RESPIRATION RATE: 18 BRPM | HEIGHT: 73 IN | SYSTOLIC BLOOD PRESSURE: 143 MMHG | TEMPERATURE: 97 F | OXYGEN SATURATION: 98 % | DIASTOLIC BLOOD PRESSURE: 78 MMHG | BODY MASS INDEX: 40.02 KG/M2 | WEIGHT: 302 LBS | HEART RATE: 81 BPM

## 2024-01-31 DIAGNOSIS — R60.9 FLUID RETENTION: Primary | ICD-10-CM

## 2024-01-31 PROCEDURE — 1101F PT FALLS ASSESS-DOCD LE1/YR: CPT | Mod: CPTII,S$GLB,, | Performed by: INTERNAL MEDICINE

## 2024-01-31 PROCEDURE — 1126F AMNT PAIN NOTED NONE PRSNT: CPT | Mod: CPTII,S$GLB,, | Performed by: INTERNAL MEDICINE

## 2024-01-31 PROCEDURE — 3077F SYST BP >= 140 MM HG: CPT | Mod: CPTII,S$GLB,, | Performed by: INTERNAL MEDICINE

## 2024-01-31 PROCEDURE — 1159F MED LIST DOCD IN RCRD: CPT | Mod: CPTII,S$GLB,, | Performed by: INTERNAL MEDICINE

## 2024-01-31 PROCEDURE — 3078F DIAST BP <80 MM HG: CPT | Mod: CPTII,S$GLB,, | Performed by: INTERNAL MEDICINE

## 2024-01-31 PROCEDURE — 99999 PR PBB SHADOW E&M-EST. PATIENT-LVL IV: CPT | Mod: PBBFAC,,, | Performed by: INTERNAL MEDICINE

## 2024-01-31 PROCEDURE — 3008F BODY MASS INDEX DOCD: CPT | Mod: CPTII,S$GLB,, | Performed by: INTERNAL MEDICINE

## 2024-01-31 PROCEDURE — 99214 OFFICE O/P EST MOD 30 MIN: CPT | Mod: S$GLB,,, | Performed by: INTERNAL MEDICINE

## 2024-01-31 PROCEDURE — 3288F FALL RISK ASSESSMENT DOCD: CPT | Mod: CPTII,S$GLB,, | Performed by: INTERNAL MEDICINE

## 2024-01-31 RX ORDER — AMILORIDE HYDROCHLORIDE 5 MG/1
10 TABLET ORAL DAILY
Qty: 60 TABLET | Refills: 11 | Status: SHIPPED | OUTPATIENT
Start: 2024-01-31 | End: 2025-01-30

## 2024-01-31 NOTE — PATIENT INSTRUCTIONS
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    -  Will stop spironolactone (due to gynecomastia), and   -  start amiloride (start with 5 mg mariela.y, increase to 10 mg daily if <3 lb decrease in weight.    -  Stop amlodipine, (due to weight gain since starting it).   -  Schedule MRI abd w wo contrast week after [place], to be done at main [place].    -  Labs every 3 months:  CBC, CMP, PT INR, AFP on March 18, 2024.  -  Present to IR after next MRI available.  -  Cardiology appt (heart failure) for pulm hypertension. Patient to check with cardiology about holding Eliquis in prep for dental implant.   -  Low salt in the diet, avoid canned, bottled and processed foods.  -  Continue current meds  -  Avoid alcohol, smoking, sedatives and meds with codeine.  -  Use sliding scale for lactulose as follows:  Take 1 cup 30 mL at 8AM.  If no stool by 12 noon, take 30 mL more of lactulose.  If <2 stools by 3 PM, take 30 mL more of lactulose.  Goal is 3-4 soft stools daily.   -  Avoid high intake of Tylenol (more than 4 extra-strength pills in one day)  -  Call us if any bleeding, fevers, confusion, disorientation occur  -  Endoscopies: to be done here in GI.   -  Return in approx 3 months.

## 2024-01-31 NOTE — Clinical Note
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   -  Will stop spironolactone (due to gynecomastia), and  -  start amiloride (start with 5 mg mariela.y, increase to 10 mg daily if <3 lb decrease in weight.   -  Stop amlodipine, (due to weight gain since starting it).  -  Schedule MRI abd w wo contrast week after [place], to be done at main [place].   -  Labs every 3 months:  CBC, CMP, PT INR, AFP on March 18, 2024. -  Present to IR after next MRI available. -  Cardiology appt (heart failure) for pulm hypertension. Patient to check with cardiology about holding Eliquis in prep for dental implant.  -  Low salt in the diet, avoid canned, bottled and processed foods. -   -  Use sliding scale for lactulose as follows:  Take 1 cup 30 mL at 8AM.  If no stool by 12 noon, take 30 mL more of lactulose.  If <2 stools by 3 PM, take 30 mL more of lactulose.  Goal is 3-4  -  Return in approx 3 months.

## 2024-01-31 NOTE — TELEPHONE ENCOUNTER
----- Message from Ishaan Geller sent at 1/31/2024  9:16 AM CST -----  Pt would like a call back asp regarding his 2pm appt today.    Pt can be reached at [phone].    Thanks

## 2024-01-31 NOTE — TELEPHONE ENCOUNTER
Advised pt via vm, appt will need to be reschd as he was not able to do his MRI. Will speak with provider regarding scan. SYLVIA FRYE

## 2024-02-01 ENCOUNTER — TELEPHONE (OUTPATIENT)
Dept: HEPATOLOGY | Facility: CLINIC | Age: 69
End: 2024-02-01
Payer: COMMERCIAL

## 2024-02-01 NOTE — TELEPHONE ENCOUNTER
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    -  Will stop spironolactone (due to gynecomastia), and   -  start amiloride (start with 5 mg mariela.y, increase to 10 mg daily if <3 lb decrease in weight.    -  Stop amlodipine, (due to weight gain since starting it).   -  Schedule MRI abd w wo contrast week after [place], to be done at main [place].    -  Labs every 3 months:  CBC, CMP, PT INR, AFP on March 18, 2024.  -  Present to IR after next MRI available.  -  Cardiology appt (heart failure) for pulm hypertension. Patient to check with cardiology about holding Eliquis in prep for dental implant.   -  Low salt in the diet, avoid canned, bottled and processed foods.  -    -  Use sliding scale for lactulose as follows:  Take 1 cup 30 mL at 8AM.  If no stool by 12 noon, take 30 mL more of lactulose.  If <2 stools by 3 PM, take 30 mL more of lactulose.  Goal is 3-4   -  Return in approx 3 months.

## 2024-02-01 NOTE — TELEPHONE ENCOUNTER
Labs every 3 months:  CBC, CMP, PT INR, AFP on March 18, 2024. 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   MRI schd.  Present to IR after next MRI available. Message sent to Aura.     Return in approx 3 months.   Appt schd. SYLVIA FRYE

## 2024-03-05 ENCOUNTER — TELEPHONE (OUTPATIENT)
Dept: HEPATOLOGY | Facility: CLINIC | Age: 69
End: 2024-03-05
Payer: COMMERCIAL

## 2024-03-06 ENCOUNTER — TELEPHONE (OUTPATIENT)
Dept: HEPATOLOGY | Facility: CLINIC | Age: 69
End: 2024-03-06
Payer: COMMERCIAL

## 2024-03-06 NOTE — TELEPHONE ENCOUNTER
----- Message from Kristen Cox MA sent at 3/6/2024  1:31 PM CST -----  Regarding: FW: Returning a Missed Call  Contact: Juan Carlos Yoo Sr.    ----- Message -----  From: Phani Pelaez  Sent: 3/6/2024  12:07 PM CST  To: [facility] Staff  Subject: Returning a Missed Call                          Returning a Missed Call    Caller: Juan Carlos Yoo Sr.       Returning call to: [facility]       Caller can be reached @: [phone] (home)      Nature of the call: Patient returning call to [facility] to schedule MRI. Requesting a call back.

## 2024-03-06 NOTE — TELEPHONE ENCOUNTER
----- Message from Phani Pelaez sent at 3/5/2024  4:36 PM CST -----  Regarding: Scheduling Request  Contact: Juan Carlos Yoo Sr.  Scheduling Request          Appt Type:  MRI    Date/Time Preference: before 3/26/2024    Treating Provider: Dr Cota    Caller Name: Juan Carlos Yoo Sr.     Contact Preference: [phone] (home)      Comments/notes: [place] [facility] [place] with Dr. Cota on 3/26/2024. Requesting a call back to confirm schedule.

## 2024-03-06 NOTE — TELEPHONE ENCOUNTER
Returned call to the patient.  Patient wants to reschedule his MRI here in [facility].  [facility]  Patient confirmed and agreed with the appt.  Reminder letter mailed.

## 2024-03-24 ENCOUNTER — HOSPITAL ENCOUNTER (OUTPATIENT)
Dept: RADIOLOGY | Facility: HOSPITAL | Age: 69
Discharge: HOME | End: 2024-03-24
Attending: INTERNAL MEDICINE
Payer: COMMERCIAL

## 2024-03-24 PROCEDURE — A9585 GADOBUTROL INJECTION: HCPCS | Performed by: INTERNAL MEDICINE

## 2024-03-24 PROCEDURE — 25500020 PHARM REV CODE 255: Performed by: INTERNAL MEDICINE

## 2024-03-24 PROCEDURE — 74183 MRI ABD W/O CNTR FLWD CNTR: CPT | Mod: 26,,, | Performed by: STUDENT IN AN ORGANIZED HEALTH CARE EDUCATION/TRAINING PROGRAM

## 2024-03-24 PROCEDURE — 74183 MRI ABD W/O CNTR FLWD CNTR: CPT | Mod: TC

## 2024-03-24 RX ORDER — GADOBUTROL 604.72 MG/ML
10 INJECTION INTRAVENOUS
Status: COMPLETED | OUTPATIENT
Start: 2024-03-24 | End: 2024-03-24

## 2024-03-24 RX ADMIN — GADOBUTROL 10 ML: 604.72 INJECTION INTRAVENOUS at 02:03

## 2024-03-26 ENCOUNTER — OFFICE VISIT (OUTPATIENT)
Dept: HEPATOLOGY | Facility: CLINIC | Age: 69
End: 2024-03-26
Payer: COMMERCIAL

## 2024-03-26 VITALS
TEMPERATURE: 98 F | OXYGEN SATURATION: 99 % | BODY MASS INDEX: 35.97 KG/M2 | SYSTOLIC BLOOD PRESSURE: 107 MMHG | HEART RATE: 93 BPM | DIASTOLIC BLOOD PRESSURE: 53 MMHG | HEIGHT: 73 IN

## 2024-03-26 DIAGNOSIS — K70.30 ALCOHOLIC CIRRHOSIS OF LIVER WITHOUT ASCITES: Primary | ICD-10-CM

## 2024-03-26 DIAGNOSIS — K70.31 ALCOHOLIC CIRRHOSIS OF LIVER WITH ASCITES: Primary | ICD-10-CM

## 2024-03-26 PROCEDURE — 3078F DIAST BP <80 MM HG: CPT | Mod: CPTII,S$GLB,, | Performed by: INTERNAL MEDICINE

## 2024-03-26 PROCEDURE — 3074F SYST BP LT 130 MM HG: CPT | Mod: CPTII,S$GLB,, | Performed by: INTERNAL MEDICINE

## 2024-03-26 PROCEDURE — 3008F BODY MASS INDEX DOCD: CPT | Mod: CPTII,S$GLB,, | Performed by: INTERNAL MEDICINE

## 2024-03-26 PROCEDURE — 1126F AMNT PAIN NOTED NONE PRSNT: CPT | Mod: CPTII,S$GLB,, | Performed by: INTERNAL MEDICINE

## 2024-03-26 PROCEDURE — 99999 PR PBB SHADOW E&M-EST. PATIENT-LVL III: CPT | Mod: PBBFAC,,, | Performed by: INTERNAL MEDICINE

## 2024-03-26 PROCEDURE — 99215 OFFICE O/P EST HI 40 MIN: CPT | Mod: S$GLB,,, | Performed by: INTERNAL MEDICINE

## 2024-03-26 NOTE — PROGRESS NOTES
[facility] Follow-up Note      This is an audio visit.      Reason for Visit:  Follow-up of cirrhosis    PCP: Nyla Rios       HPI:  This is a 68 y.o. male here for follow-up of: decompensated cirrhosis.    Interval History 3/26/24:  Patient states sometimes when he stands up, he feels dizzy.    Orthostatics now in clinic:  Sitting  /58, Pulse 90/min  Standing /53, Pulse 93/min    Stop amlodipine, and amiloride (no edema LE).  Continue nadolol 40 mg every evening.     MRI w wo 3/24/24:  Liver: Cirrhotic morphology.  Post treatment changes of radioembolization.  Stable size ablation cavity at the hepatic dome measuring approximately 5.2 x 3.4 cm (series 12, image 19), similar to prior exam allowing for differences in measurement technique.  Peripheral enhancement surrounds the ablation cavity which progresses on venous and delayed phases consistent with scarring/fibrosis.  No evidence of local residual/recurrent disease.  Previously described indeterminate subcentimeter focus of arterial enhancement in the right hepatic lobe is not visualized on today's exam.  No new suspicious enhancing lesion.  Portal veins are patent.     AFP 4.4  Hgb 11/1.       Interval history: 10/2023  he is not doing well.  Has slowly gained approx 10 lb between May and Oct 32485, but then escalated 30 lb (I seriously doubt it) in 2.5 months. Of which, 27 lb lost in 20 days.  These weights may not be accurate.  His left upper extremity is generally bigger than right arm.  Both breasts are enlarged, most likely due to spironolactone.- will switch to amiloride  will recommending d/C amlodipine.     His abdomen has no pain, no swelling of his feet/ankles.     MRI abd 10/18/23:  -  Stable post treatment changes of radioembolization (4.3 x 3.6 cm ablation cavity) for hepatocellular carcinoma.  No evidence of local recurrence.  -  Indeterminate subcentimeter focus of arterial enhancement in the right hepatic lobe  "without washout or pseudo capsule.  -  Cirrhotic liver morphology.    Will get CBC, CMP, PT INR, AFP, MRI w wo contrast in 3 months (January 18/2024).     Creatinine, LFTs normal,   T bili 1.4.       Meds:   Spirono 50/d, lasix 40/d, nadolol 40 mg HS, eliquis 5 mg BID for AFib (check with cardiologist what to do when needs dental implant), proronix 40 mg/d, amlodipine 10/d, tamsulosin 0.4 mg/d.      Continue above meds. Excpet in prep for dental implant will need to be off eliquis, patient will check with cardiologist - maybe lovenox bridge.     Interval History: 4/2023   Patient completed transplant eval and was presented to selection committee, however, he was declined due to persistent positive PETH tests, high BMI. Committee recommended having liver lesion treated.  Has appointment with IR on June 6, 2023 to have mapping for the lesion to be treated with Y-90.       Today, Mr. Yoo states he has lost 5 lbs.  Balance is "not so good".      Decomp cirrhosis with fatigue, hepatic encephalopathy, thrombocytopenia, eso varices wo bleeding.  EGD and colonoscopy were supposed to be done 2 months ago, he was prepped, and anesthesia did not want to go forward, he was sent to ECHO: A Fib cardioverted 3 times did not stop the arrhythmia, but in the ER he got a shot, and heart converted to sinus rhythm, no more A Fib since.     ECHO 6/15/22:   The left ventricle is normal in size with normal systolic function.  The estimated ejection fraction is 55-60%.  The quantitatively derived ejection fraction is 56%.  A diastolic pattern consistent with atrial fibrillation observed.  Severe left atrial enlargement.  Mild right ventricular enlargement with normal right ventricular systolic function.  Severe right atrial enlargement.  Mild aortic regurgitation.  Mild mitral regurgitation.  The estimated PA systolic pressure is 53 mmHg.  There is moderate pulmonary hypertension.  Mild tricuspid regurgitation.  Intermediate central " venous pressure (8 mmHg).     Repeat ECHO normal.   Cardiac cath also minimal vasc abnormality, no signific coronary obstruction.     Liver lesion 1.3 cm, indeterminate, possible HCC.     OCTAVIA Miranda note 10/6/22:  He has mentioned drinking heavily several weeks before the blood work due to rectal pressure, but he needs to be followed by transplant at this point due to the severity of his liver disease.     MELD was 23 on 9/29/22.  17 today.      MELD 3.0: 19 at 3/18/2024  3:07 PM  MELD-Na: 18 at 3/18/2024  3:07 PM  Calculated from:  Serum Creatinine: 1.6 mg/dL at 3/18/2024  3:07 PM  Serum Sodium: 137 mmol/L at 3/18/2024  3:07 PM  Total Bilirubin: 1.9 mg/dL at 3/18/2024  3:07 PM  Serum Albumin: 2.5 g/dL at 3/18/2024  3:07 PM  INR(ratio): 1.5 at 3/18/2024  3:07 PM  Age at listing (hypothetical): 68 years  Sex: Male at 3/18/2024  3:07 PM       US abd 7/1/22:  The liver is normal in size measuring 14.2 cm in length. The liver is heterogeneous in echogenicity and nodular in contour compatible with cirrhosis.  No focal hepatic lesions are identified.  The main portal vein appears patent with antegrade flow.  No intrahepatic biliary ductal dilatation is detected. The common bile duct is within normal limits at 0.4 cm.  The gallbladder is absent.  The right kidney is normal in size measuring 12.2 cm with no evidence of hydronephrosis.  A possible 0.7 cm nonobstructing right renal calculus is noted in the superior pole.  A 0.9 cm cyst is noted in the superior pole of the right kidney.  The spleen is enlarged measuring 13.1 x 5.5 cm.     Impression:   1. Heterogeneous nodular liver compatible with cirrhosis.  2. Cholecystectomy.  3. Splenomegaly.  4. Possible 0.7 cm nonobstructing right renal calculus.  5. Right renal cyst.      Elevated liver enzymes: Yes  Abnormal imaging: Yes  Cirrhosis: Yes  Hepatitis C: No  Hepatitis B: No  Fatty liver: No  Encephalopathy: Yes  Post-hospital discharge: No  Symptoms: Mild swelling in  the legs.      Primary hepatic manifestations:  Fatigue:Yes  Edema:Yes  Ascites:Yes - para x 2 in the last year.  Encephalopathy:Yes  Abdominal pain:No  GI bleeds: No  Pruritus:No  Weight Changes:No  Changes in Bowel habits: No  Muscle cramps:No    Risk factors for liver disease:  No jaundice  No transfusions  No IVDU  Did not snort cocaine or similar agents  Did not live with anyone with hepatitis B or C  Sexual partner not tested  No hepatotoxic medications  No exposure to industrial toxins  Alcohol: not drinking over for a month      ROS:  Constitutional: No fevers, chills, weight changes, fatigue  ENT: No allergies, nosebleeds,   CV: No chest pain  Pulm: No cough, shortness of breath  Ophtho: No vision changes  GI/Liver: see HPI  Derm: No rash, itching  Heme: No swollen glands, bruising  MSK: No joint pains, joint swelling  : No dysuria, hematuria, decrease in urine output  Endo: No hot or cold intolerance  Neuro: No confusion, disorientation, difficulty with sleep, memory, concentration, syncope, seizure  Psych: No anxiety, depression    Medical History:  has a past medical history of Atrial fibrillation, Bulging disc, Cirrhosis, Colon polyp (12/29/2017), EV (esophageal varices), Hypertension (3/30/2023), Skin cancer (03/2017), and Thrombocytopenia.    Surgical History:  has a past surgical history that includes Cholecystectomy; Tonsillectomy; Vasectomy; Skin biopsy (03/2017); Hernia repair; Upper gastrointestinal endoscopy (2011); Upper gastrointestinal endoscopy (2016); Colonoscopy; Colonoscopy (N/A, 12/29/2017); Catheterization of both left and right heart (N/A, 2/8/2023); and Coronary angiography (N/A, 2/8/2023).    Family History: family history includes Cancer in his maternal uncle; Heart disease in his maternal uncle; Hyperlipidemia in his brother; Ovarian cancer in his sister..     Social History:  reports that he has never smoked. He has never used smokeless tobacco. He reports that he does not  "currently use alcohol. He reports that he does not use drugs.    Review of patient's allergies indicates:  No Known Allergies    Current Outpatient Rx   Medication Sig Dispense Refill    aMILoride (MIDAMOR) 5 MG Tab Take 2 tablets (10 mg total) by mouth once daily. Start with 5 mg (1 tablet) daily, increase to 10 mg (2 tablets) daily if not losing 3 pounds a week. 60 tablet 11    amLODIPine (NORVASC) 10 MG tablet Take 1 tablet (10 mg total) by mouth once daily. 90 tablet 3    apixaban (ELIQUIS) 5 mg Tab Take 1 tablet (5 mg total) by mouth 2 (two) times daily. 180 tablet 3    ascorbic acid, vitamin C, (VITAMIN C) 500 MG tablet Take 500 mg by mouth once daily.      cyanocobalamin (VITAMIN B-12) 100 MCG tablet Take 100 mcg by mouth once daily.      fish oil-omega-3 fatty acids 300-1,000 mg capsule Take 1 g by mouth 2 (two) times daily.      furosemide (LASIX) 40 MG tablet Take 1 tablet (40 mg total) by mouth 2 (two) times a day. 4 tablet 0    nadoloL (CORGARD) 20 MG tablet TAKE 2 TABLETS BY MOUTH EVERY EVENING 180 tablet 3    pantoprazole (PROTONIX) 40 MG tablet Take 1 tablet (40 mg total) by mouth once daily. 90 tablet 3    penicillin v potassium (VEETID) 500 MG tablet Take 250 mg by mouth 2 (two) times a day.      tamsulosin (FLOMAX) 0.4 mg Cap Take 1 capsule (0.4 mg total) by mouth every evening. 30 capsule 6    tramadol-acetaminophen 37.5-325 mg (ULTRACET) 37.5-325 mg Tab Take 1 tablet by mouth every 4 to 6 hours as needed.         Objective Findings:    Vital Signs:  BP (!) 107/53 (BP Location: Left arm, Patient Position: Standing, BP Method: Large (Automatic))   Pulse 93   Temp 97.8 °F (36.6 °C) (Oral)   Ht 6' 1" (1.854 m)   SpO2 99% Comment: RA  BMI 35.97 kg/m²   Body mass index is 35.97 kg/m².    Physical Exam:  General Appearance: Well appearing in no acute distress  Head:   Normocephalic, without obvious abnormality  Eyes:    No scleral icterus, EOMI  ENT: Neck supple, Lips, mucosa, and tongue normal; " teeth and gums normal  Lungs: CTA bilaterally in anterior and posterior fields, no wheezes, no crackles.  Heart:  Regular rate and rhythm, S1, S2 normal, no murmurs heard  Abdomen: Soft, non tender, non distended with positive bowel sounds in all four quadrants. No hepatosplenomegaly, ascites, or mass  Extremities: 2+ pulses, no clubbing, cyanosis or edema  Skin: No rash  Neurologic: CN II-XII intact, alert, oriented x 3. No asterixis      Labs:  Lab Results   Component Value Date    WBC 5.91 03/18/2024    HGB 11.1 (L) 03/18/2024    HCT 34.5 (L) 03/18/2024     03/18/2024    CHOL 120 01/29/2024    TRIG 50 01/29/2024    HDL 19 (L) 01/29/2024    INR 1.5 (H) 03/18/2024    CREATININE 1.6 (H) 03/18/2024    BUN 16 03/18/2024    BILITOT 1.9 (H) 03/18/2024    ALT 14 03/18/2024    AST 34 03/18/2024    ALKPHOS 60 03/18/2024     03/18/2024    K 3.0 (L) 03/18/2024    CL 96 03/18/2024    CO2 29 03/18/2024    TSH 2.208 12/13/2022    PSA 0.18 09/29/2022    HGBA1C 4.5 12/13/2022    AFP 4.4 03/18/2024     MELD 3.0: 19 at 3/18/2024  3:07 PM  MELD-Na: 18 at 3/18/2024  3:07 PM  Calculated from:  Serum Creatinine: 1.6 mg/dL at 3/18/2024  3:07 PM  Serum Sodium: 137 mmol/L at 3/18/2024  3:07 PM  Total Bilirubin: 1.9 mg/dL at 3/18/2024  3:07 PM  Serum Albumin: 2.5 g/dL at 3/18/2024  3:07 PM  INR(ratio): 1.5 at 3/18/2024  3:07 PM  Age at listing (hypothetical): 68 years  Sex: Male at 3/18/2024  3:07 PM       Imaging:       Endoscopy:      Assessment:  No diagnosis found.    Decomp. Alcohol related cirrhosis, with HE, ascites (present), edema (worse now), mild icterus, moderate Pulm Hypertension,  improving with MELD 17 today 1/31/24.  AFP normal. Creat normal.   -  Will stop spironolactone (due to gynecomastia), and   -  start amiloride (start with 5 mg daily, increase to 10 mg daily if <3 lb decrease in weight.    -  Stop amlodipine, (due to weight gain since starting it).  Inform PCP that amlodipine has been stopped. Have BP  checked from PCP office   -  Schedule MRI abd w wo contrast week after [place], to be done at [facility].        First ECHO with pulm HTN due to fluid overload, but after diuresis PASP was normal.   Liver imaging positive for HCC: Liver lesion 1.3 cm, indeterminate, likely HCC.  Y-90 treatment on 6/8/23. 4-month f/u MRI was without recurrence or residual. Now, next 3-month MRI is ordered.      MRI abd 10/18/23:  -  Stable post treatment changes of radioembolization (4.3 x 3.6 cm ablation cavity) for hepatocellular carcinoma.  No evidence of local recurrence.  -  Indeterminate subcentimeter focus of arterial enhancement in the right hepatic lobe without washout or pseudo capsule.  -  Cirrhotic liver morphology.    Will get CBC, CMP, PT INR, AFP, MRI w wo contrast in 3 months (January 18/2024).     Creatinine, LFTs normal,   T bili 1.4.       Meds:   Spirono 50/d, lasix 40/d, nadolol 40 mg HS, eliquis 5 mg BID for AFib (check with cardiologist what to do when needs dental implant), proronix 40 mg/d,   STOP amlodipine 10/d, tamsulosin 0.4 mg/d.  STOP amiloride.    Continue above meds. Except in prep for dental implant will need to be off eliquis, patient will check with cardiologist - maybe lovenox bridge.      Recommendations:  -  Patient advised to let us know sitting and standing BP and Pulse  - Present to IR next week to review MRI 3/2024  -  STOP amlodipine and STOP amiloride.  -  Continue nadolol 40 mg every evening.  -  Schedule MRI abd w wo contrast, and AFP to be done at [facility], around June 26/24. .    -  Labs every 6 months:  CBC, CMP, PT INR, AFP starting Sept 26/2024.  -  Cardiology appt (heart failure) for pulm hypertension. Patient to check with cardiology about holding Eliquis in prep for dental implant.   -  Low salt in the diet, avoid canned, bottled and processed foods.  -  Continue current meds  -  Avoid alcohol, smoking, sedatives and meds with codeine.  -  Use sliding scale for lactulose  as follows:  Take 1 cup 30 mL at 8AM.  If no stool by 12 noon, take 30 mL more of lactulose.  If <2 stools by 3 PM, take 30 mL more of lactulose.  Goal is 3-4 soft stools daily.   -  Avoid high intake of Tylenol (more than 4 extra-strength pills in one day)  -  Call us if any bleeding, fevers, confusion, disorientation occur  -  Endoscopies: to be done here in GI.   -  Return in approx 6 months.         Follow up in about 6 months (around 9/26/2024).      Order summary:  No orders of the defined types were placed in this encounter.        Thank you so much for allowing me to participate in the care of Juan Carlos Cota MD

## 2024-03-26 NOTE — PATIENT INSTRUCTIONS
Recommendations:  -  Patient advised to let us know sitting and standing BP and Pulse  - Present to IR next week to review MRI 3/2024  -  STOP amlodipine and STOP amiloride.  -  Continue nadolol 40 mg every evening.  -  Schedule MRI abd w wo contrast, and AFP to be done at [facility], around June 26/24. .    -  Labs every 6 months:  CBC, CMP, PT INR, AFP starting Sept 26/2024.  -  Cardiology appt (heart failure) for pulm hypertension. Patient to check with cardiology about holding Eliquis in prep for dental implant.   -  Low salt in the diet, avoid canned, bottled and processed foods.  -  Continue current meds  -  Avoid alcohol, smoking, sedatives and meds with codeine.  -  Use sliding scale for lactulose as follows:  Take 1 cup 30 mL at 8AM.  If no stool by 12 noon, take 30 mL more of lactulose.  If <2 stools by 3 PM, take 30 mL more of lactulose.  Goal is 3-4 soft stools daily.   -  Avoid high intake of Tylenol (more than 4 extra-strength pills in one day)  -  Call us if any bleeding, fevers, confusion, disorientation occur  -  Endoscopies: to be done here in GI.   -  Return in approx 6 months.

## 2024-03-26 NOTE — Clinical Note
Recommendations: -  Patient advised to let us know sitting and standing BP and Pulse - Present to IR next week to review MRI 3/2024 -  STOP amlodipine and STOP amiloride. -  Continue nadolol 40 mg every evening. -  Schedule MRI abd w wo contrast, and AFP to be done at [facility], around June 26/24. .   -  Labs every 6 months:  CBC, CMP, PT INR, AFP starting Sept 26/2024. -  Cardiology appt (heart failure) for pulm hypertension. Patient to check with cardiology about holding Eliquis in prep for dental implant.  -  Low salt in the diet, avoid canned, bottled and processed foods. -  Continue current meds -  Avoid alcohol, smoking, sedatives and meds with codeine. -  Use sliding scale for lactulose as follows:  Take 1 cup 30 mL at 8AM.  If no stool by 12 noon, take 30 mL more of lactulose.  If <2 stools by 3 PM, take 30 mL more of lactulose.  Goal is 3-4 soft stools daily.  -  Avoid high intake of Tylenol (more than 4 extra-strength pills in one day) -  Endoscopies: at [facility] [place] -  Return in 6 months.

## 2024-03-27 ENCOUNTER — TELEPHONE (OUTPATIENT)
Dept: HEPATOLOGY | Facility: CLINIC | Age: 69
End: 2024-03-27
Payer: COMMERCIAL

## 2024-03-27 NOTE — TELEPHONE ENCOUNTER
----- Message from Carlito Her MD sent at 3/27/2024 11:40 AM CDT -----  Regarding: RE: previously treated HCC, any recurrence on latest MRI?  Agree with findings  Gilbert  ----- Message -----  From: Francisco Nance MD  Sent: 3/27/2024  11:27 AM CDT  To: Deanna Cota MD; Aura Soares, RN; #  Subject: RE: previously treated HCC, any recurrence o#    MRI looks good. F/U in 3 months.    [facility]e doesn't read MRI.    Francisco    ----- Message -----  From: Deanna Cota MD  Sent: 3/27/2024  11:21 AM CDT  To: Francisco Nance MD; Aura Soares, RN; #  Subject: previously treated HCC, any recurrence on la#    Patient treated with y-90 in June 2023, had a follow-up mRI couple of days ago.  Please review, let me know any concerns?     Team-   I feel like I'm sending a lot of patients through this staff messaging route, if it's too much, or if you feel we can go with the original imaging, please let me know.  I don't want to inundate your services.      Where is Dr Cazares? I don't see his name on CT readings now-a-days.     Deanna

## 2024-08-25 DIAGNOSIS — K70.30 ALCOHOLIC CIRRHOSIS OF LIVER WITHOUT ASCITES: ICD-10-CM

## 2024-08-25 DIAGNOSIS — I48.19 PERSISTENT ATRIAL FIBRILLATION: ICD-10-CM

## 2024-08-27 RX ORDER — NADOLOL 20 MG/1
TABLET ORAL
Qty: 180 TABLET | Refills: 3 | Status: SHIPPED | OUTPATIENT
Start: 2024-08-27

## 2024-10-04 PROBLEM — N17.9 AKI (ACUTE KIDNEY INJURY): Status: ACTIVE | Noted: 2024-10-04

## 2024-10-04 PROBLEM — E87.70 VOLUME OVERLOAD: Status: ACTIVE | Noted: 2024-10-04

## 2024-10-05 PROBLEM — K76.7 HEPATORENAL SYNDROME: Status: ACTIVE | Noted: 2024-10-05

## 2024-10-09 PROBLEM — R06.02 SHORTNESS OF BREATH: Status: RESOLVED | Noted: 2023-01-17 | Resolved: 2024-10-09

## 2024-10-09 PROBLEM — I10 HYPERTENSION: Status: RESOLVED | Noted: 2023-03-30 | Resolved: 2024-10-09

## 2024-10-21 ENCOUNTER — TELEPHONE (OUTPATIENT)
Dept: HEPATOLOGY | Facility: CLINIC | Age: 69
End: 2024-10-21
Payer: COMMERCIAL

## 2024-10-21 NOTE — TELEPHONE ENCOUNTER
----- Message from Kathi sent at 10/21/2024  1:41 PM CDT -----  Regarding: Appointments        Name Of Caller:   Juan Carlos      Contact Preference:  [phone]       Nature of call:   Pt would like to reschedule their appt on 11/01. He'll be out of town from Thursday evening on 10/31 to Tuesday 11/05. Requesting call back.

## 2024-11-20 ENCOUNTER — HOSPITAL ENCOUNTER (INPATIENT)
Facility: HOSPITAL | Age: 69
LOS: 40 days | Discharge: HOME | DRG: 871 | End: 2024-12-30
Attending: STUDENT IN AN ORGANIZED HEALTH CARE EDUCATION/TRAINING PROGRAM | Admitting: STUDENT IN AN ORGANIZED HEALTH CARE EDUCATION/TRAINING PROGRAM
Payer: COMMERCIAL

## 2024-11-20 DIAGNOSIS — K76.7 HEPATORENAL FAILURE: ICD-10-CM

## 2024-11-20 DIAGNOSIS — R07.9 CHEST PAIN: ICD-10-CM

## 2024-11-20 DIAGNOSIS — A41.9 SEPSIS, DUE TO UNSPECIFIED ORGANISM, UNSPECIFIED WHETHER ACUTE ORGAN DYSFUNCTION PRESENT: ICD-10-CM

## 2024-11-20 DIAGNOSIS — K76.7 HEPATORENAL SYNDROME: ICD-10-CM

## 2024-11-20 DIAGNOSIS — R53.81 DEBILITY: ICD-10-CM

## 2024-11-20 DIAGNOSIS — K72.90 DECOMPENSATED CIRRHOSIS: ICD-10-CM

## 2024-11-20 DIAGNOSIS — L03.213 PERIORBITAL CELLULITIS OF LEFT EYE: ICD-10-CM

## 2024-11-20 DIAGNOSIS — E87.5 HYPERKALEMIA: ICD-10-CM

## 2024-11-20 DIAGNOSIS — Z71.89 ADVANCED CARE PLANNING/COUNSELING DISCUSSION: ICD-10-CM

## 2024-11-20 DIAGNOSIS — R10.9 ABDOMINAL PAIN: ICD-10-CM

## 2024-11-20 DIAGNOSIS — R60.9 EDEMA: ICD-10-CM

## 2024-11-20 DIAGNOSIS — K74.60 DECOMPENSATED CIRRHOSIS: ICD-10-CM

## 2024-11-20 DIAGNOSIS — R06.02 SHORTNESS OF BREATH: ICD-10-CM

## 2024-11-20 DIAGNOSIS — N17.9 AKI (ACUTE KIDNEY INJURY): Primary | ICD-10-CM

## 2024-11-20 DIAGNOSIS — E87.1 HYPONATREMIA: ICD-10-CM

## 2024-11-20 DIAGNOSIS — R65.20 SEVERE SEPSIS: ICD-10-CM

## 2024-11-20 DIAGNOSIS — R60.9 EDEMA, UNSPECIFIED TYPE: ICD-10-CM

## 2024-11-20 DIAGNOSIS — K70.31 ALCOHOLIC CIRRHOSIS OF LIVER WITH ASCITES: ICD-10-CM

## 2024-11-20 DIAGNOSIS — L03.90 CELLULITIS, UNSPECIFIED CELLULITIS SITE: ICD-10-CM

## 2024-11-20 DIAGNOSIS — A41.9 SEVERE SEPSIS: ICD-10-CM

## 2024-11-20 DIAGNOSIS — Z51.5 PALLIATIVE CARE ENCOUNTER: ICD-10-CM

## 2024-11-20 DIAGNOSIS — I95.9 HYPOTENSION: ICD-10-CM

## 2024-11-20 PROBLEM — N18.31 STAGE 3A CHRONIC KIDNEY DISEASE: Status: ACTIVE | Noted: 2024-11-20

## 2024-11-20 PROBLEM — D50.9 MICROCYTIC ANEMIA: Status: ACTIVE | Noted: 2024-11-20

## 2024-11-20 LAB
ABO + RH BLD: NORMAL
ALBUMIN SERPL BCP-MCNC: 2.8 G/DL (ref 3.5–5.2)
ALLENS TEST: ABNORMAL
ALP SERPL-CCNC: 47 U/L (ref 40–150)
ALT SERPL W/O P-5'-P-CCNC: 9 U/L (ref 10–44)
AMMONIA PLAS-SCNC: 104 UMOL/L (ref 10–50)
ANION GAP SERPL CALC-SCNC: 12 MMOL/L (ref 8–16)
ANION GAP SERPL CALC-SCNC: 17 MMOL/L (ref 8–16)
AST SERPL-CCNC: 35 U/L (ref 10–40)
BASOPHILS # BLD AUTO: 0.01 K/UL (ref 0–0.2)
BASOPHILS NFR BLD: 0.1 % (ref 0–1.9)
BILIRUB SERPL-MCNC: 4.8 MG/DL (ref 0.1–1)
BILIRUB UR QL STRIP: NEGATIVE
BLD GP AB SCN CELLS X3 SERPL QL: NORMAL
BLD PROD TYP BPU: NORMAL
BLOOD UNIT EXPIRATION DATE: NORMAL
BLOOD UNIT TYPE CODE: 6200
BLOOD UNIT TYPE: NORMAL
BNP SERPL-MCNC: 363 PG/ML (ref 0–99)
BUN SERPL-MCNC: 45 MG/DL (ref 6–30)
BUN SERPL-MCNC: 48 MG/DL (ref 8–23)
BUN SERPL-MCNC: 49 MG/DL (ref 8–23)
CALCIUM SERPL-MCNC: 9.6 MG/DL (ref 8.7–10.5)
CALCIUM SERPL-MCNC: 9.7 MG/DL (ref 8.7–10.5)
CHLORIDE SERPL-SCNC: 96 MMOL/L (ref 95–110)
CLARITY UR REFRACT.AUTO: ABNORMAL
CO2 SERPL-SCNC: 18 MMOL/L (ref 23–29)
CO2 SERPL-SCNC: 20 MMOL/L (ref 23–29)
CODING SYSTEM: NORMAL
COLOR UR AUTO: YELLOW
CREAT SERPL-MCNC: 5.7 MG/DL (ref 0.5–1.4)
CREAT SERPL-MCNC: 5.9 MG/DL (ref 0.5–1.4)
CREAT SERPL-MCNC: 6.5 MG/DL (ref 0.5–1.4)
CREAT UR-MCNC: 166 MG/DL (ref 23–375)
CROSSMATCH INTERPRETATION: NORMAL
DIFFERENTIAL METHOD BLD: ABNORMAL
DISPENSE STATUS: NORMAL
EOSINOPHIL # BLD AUTO: 0 K/UL (ref 0–0.5)
EOSINOPHIL NFR BLD: 0 % (ref 0–8)
ERYTHROCYTE [DISTWIDTH] IN BLOOD BY AUTOMATED COUNT: 19.7 % (ref 11.5–14.5)
EST. GFR  (NO RACE VARIABLE): 10 ML/MIN/1.73 M^2
EST. GFR  (NO RACE VARIABLE): 9.6 ML/MIN/1.73 M^2
GLUCOSE SERPL-MCNC: 101 MG/DL (ref 70–110)
GLUCOSE SERPL-MCNC: 102 MG/DL (ref 70–110)
GLUCOSE SERPL-MCNC: 98 MG/DL (ref 70–110)
GLUCOSE UR QL STRIP: NEGATIVE
HCO3 UR-SCNC: 22.1 MMOL/L (ref 24–28)
HCT VFR BLD AUTO: 21.1 % (ref 40–54)
HCT VFR BLD CALC: 23 %PCV (ref 36–54)
HCV AB SERPL QL IA: NORMAL
HGB BLD-MCNC: 6.7 G/DL (ref 14–18)
HGB UR QL STRIP: NEGATIVE
HIV 1+2 AB+HIV1 P24 AG SERPL QL IA: NORMAL
HYALINE CASTS UR QL AUTO: 4 /LPF
IMM GRANULOCYTES # BLD AUTO: 0.07 K/UL (ref 0–0.04)
IMM GRANULOCYTES NFR BLD AUTO: 0.6 % (ref 0–0.5)
INR PPP: 2.2 (ref 0.8–1.2)
IRON SERPL-MCNC: 25 UG/DL (ref 45–160)
KETONES UR QL STRIP: NEGATIVE
LACTATE SERPL-SCNC: 2.9 MMOL/L (ref 0.5–2.2)
LACTATE SERPL-SCNC: 3.5 MMOL/L (ref 0.5–2.2)
LDH SERPL L TO P-CCNC: 3.42 MMOL/L (ref 0.5–2.2)
LDH SERPL L TO P-CCNC: 3.94 MMOL/L (ref 0.5–2.2)
LEUKOCYTE ESTERASE UR QL STRIP: ABNORMAL
LYMPHOCYTES # BLD AUTO: 0.3 K/UL (ref 1–4.8)
LYMPHOCYTES NFR BLD: 2.6 % (ref 18–48)
MCH RBC QN AUTO: 23.8 PG (ref 27–31)
MCHC RBC AUTO-ENTMCNC: 31.8 G/DL (ref 32–36)
MCV RBC AUTO: 75 FL (ref 82–98)
MICROSCOPIC COMMENT: ABNORMAL
MONOCYTES # BLD AUTO: 1.2 K/UL (ref 0.3–1)
MONOCYTES NFR BLD: 9.3 % (ref 4–15)
NEUTROPHILS # BLD AUTO: 11.1 K/UL (ref 1.8–7.7)
NEUTROPHILS NFR BLD: 87.4 % (ref 38–73)
NITRITE UR QL STRIP: NEGATIVE
NRBC BLD-RTO: 0 /100 WBC
OHS QRS DURATION: 94 MS
OHS QTC CALCULATION: 444 MS
PCO2 BLDA: 34.7 MMHG (ref 35–45)
PH SMN: 7.41 [PH] (ref 7.35–7.45)
PH UR STRIP: 6 [PH] (ref 5–8)
PLATELET # BLD AUTO: 81 K/UL (ref 150–450)
PMV BLD AUTO: 11.3 FL (ref 9.2–12.9)
PO2 BLDA: 40 MMHG (ref 40–60)
POC BE: -2 MMOL/L
POC IONIZED CALCIUM: 1.05 MMOL/L (ref 1.06–1.42)
POC SATURATED O2: 76 % (ref 95–100)
POC TCO2 (MEASURED): 21 MMOL/L (ref 23–29)
POC TCO2: 23 MMOL/L (ref 24–29)
POTASSIUM BLD-SCNC: 5.6 MMOL/L (ref 3.5–5.1)
POTASSIUM SERPL-SCNC: 5.1 MMOL/L (ref 3.5–5.1)
POTASSIUM SERPL-SCNC: 6.1 MMOL/L (ref 3.5–5.1)
PROT SERPL-MCNC: 7.6 G/DL (ref 6–8.4)
PROT UR QL STRIP: ABNORMAL
PROT UR-MCNC: 30 MG/DL (ref 0–15)
PROT/CREAT UR: 0.18 MG/G{CREAT} (ref 0–0.2)
PROTHROMBIN TIME: 22.6 SEC (ref 9–12.5)
RBC # BLD AUTO: 2.82 M/UL (ref 4.6–6.2)
RBC #/AREA URNS AUTO: 4 /HPF (ref 0–4)
SAMPLE: ABNORMAL
SATURATED IRON: 6 % (ref 20–50)
SITE: ABNORMAL
SODIUM BLD-SCNC: 131 MMOL/L (ref 136–145)
SODIUM SERPL-SCNC: 128 MMOL/L (ref 136–145)
SODIUM SERPL-SCNC: 131 MMOL/L (ref 136–145)
SODIUM UR-SCNC: <10 MMOL/L (ref 20–250)
SP GR UR STRIP: 1.01 (ref 1–1.03)
SPECIMEN OUTDATE: NORMAL
SQUAMOUS #/AREA URNS AUTO: 0 /HPF
TOTAL IRON BINDING CAPACITY: 389 UG/DL (ref 250–450)
TRANS ERYTHROCYTES VOL PATIENT: NORMAL ML
TRANSFERRIN SERPL-MCNC: 263 MG/DL (ref 200–375)
TROPONIN I SERPL DL<=0.01 NG/ML-MCNC: 0.02 NG/ML (ref 0–0.03)
URN SPEC COLLECT METH UR: ABNORMAL
WBC # BLD AUTO: 12.67 K/UL (ref 3.9–12.7)
WBC #/AREA URNS AUTO: >100 /HPF (ref 0–5)
WBC CLUMPS UR QL AUTO: ABNORMAL

## 2024-11-20 PROCEDURE — 51702 INSERT TEMP BLADDER CATH: CPT

## 2024-11-20 PROCEDURE — 82140 ASSAY OF AMMONIA: CPT | Performed by: PHYSICIAN ASSISTANT

## 2024-11-20 PROCEDURE — 83605 ASSAY OF LACTIC ACID: CPT | Performed by: INTERNAL MEDICINE

## 2024-11-20 PROCEDURE — 27000207 HC ISOLATION

## 2024-11-20 PROCEDURE — 84300 ASSAY OF URINE SODIUM: CPT

## 2024-11-20 PROCEDURE — 94640 AIRWAY INHALATION TREATMENT: CPT

## 2024-11-20 PROCEDURE — 25000003 PHARM REV CODE 250: Performed by: INTERNAL MEDICINE

## 2024-11-20 PROCEDURE — 80048 BASIC METABOLIC PNL TOTAL CA: CPT | Mod: XB | Performed by: INTERNAL MEDICINE

## 2024-11-20 PROCEDURE — 99285 EMERGENCY DEPT VISIT HI MDM: CPT | Mod: 25

## 2024-11-20 PROCEDURE — 96365 THER/PROPH/DIAG IV INF INIT: CPT

## 2024-11-20 PROCEDURE — 87186 SC STD MICRODIL/AGAR DIL: CPT

## 2024-11-20 PROCEDURE — 84484 ASSAY OF TROPONIN QUANT: CPT | Performed by: PHYSICIAN ASSISTANT

## 2024-11-20 PROCEDURE — 93010 ELECTROCARDIOGRAM REPORT: CPT | Mod: ,,, | Performed by: STUDENT IN AN ORGANIZED HEALTH CARE EDUCATION/TRAINING PROGRAM

## 2024-11-20 PROCEDURE — 82746 ASSAY OF FOLIC ACID SERUM: CPT | Performed by: INTERNAL MEDICINE

## 2024-11-20 PROCEDURE — 81001 URINALYSIS AUTO W/SCOPE: CPT

## 2024-11-20 PROCEDURE — 20600001 HC STEP DOWN PRIVATE ROOM

## 2024-11-20 PROCEDURE — 86803 HEPATITIS C AB TEST: CPT | Performed by: PHYSICIAN ASSISTANT

## 2024-11-20 PROCEDURE — P9047 ALBUMIN (HUMAN), 25%, 50ML: HCPCS | Mod: JZ,JG | Performed by: INTERNAL MEDICINE

## 2024-11-20 PROCEDURE — 83540 ASSAY OF IRON: CPT | Performed by: INTERNAL MEDICINE

## 2024-11-20 PROCEDURE — 87154 CUL TYP ID BLD PTHGN 6+ TRGT: CPT

## 2024-11-20 PROCEDURE — 99900035 HC TECH TIME PER 15 MIN (STAT)

## 2024-11-20 PROCEDURE — 87088 URINE BACTERIA CULTURE: CPT

## 2024-11-20 PROCEDURE — 63600175 PHARM REV CODE 636 W HCPCS

## 2024-11-20 PROCEDURE — 87086 URINE CULTURE/COLONY COUNT: CPT

## 2024-11-20 PROCEDURE — 82607 VITAMIN B-12: CPT | Performed by: INTERNAL MEDICINE

## 2024-11-20 PROCEDURE — 85610 PROTHROMBIN TIME: CPT | Performed by: INTERNAL MEDICINE

## 2024-11-20 PROCEDURE — 63600175 PHARM REV CODE 636 W HCPCS: Mod: JZ,JG | Performed by: INTERNAL MEDICINE

## 2024-11-20 PROCEDURE — 30233N1 TRANSFUSION OF NONAUTOLOGOUS RED BLOOD CELLS INTO PERIPHERAL VEIN, PERCUTANEOUS APPROACH: ICD-10-PCS | Performed by: STUDENT IN AN ORGANIZED HEALTH CARE EDUCATION/TRAINING PROGRAM

## 2024-11-20 PROCEDURE — 87077 CULTURE AEROBIC IDENTIFY: CPT

## 2024-11-20 PROCEDURE — 96367 TX/PROPH/DG ADDL SEQ IV INF: CPT

## 2024-11-20 PROCEDURE — 82105 ALPHA-FETOPROTEIN SERUM: CPT | Performed by: INTERNAL MEDICINE

## 2024-11-20 PROCEDURE — 87150 DNA/RNA AMPLIFIED PROBE: CPT | Mod: 59

## 2024-11-20 PROCEDURE — 83605 ASSAY OF LACTIC ACID: CPT

## 2024-11-20 PROCEDURE — 83880 ASSAY OF NATRIURETIC PEPTIDE: CPT | Performed by: PHYSICIAN ASSISTANT

## 2024-11-20 PROCEDURE — 93005 ELECTROCARDIOGRAM TRACING: CPT

## 2024-11-20 PROCEDURE — 80053 COMPREHEN METABOLIC PANEL: CPT | Performed by: PHYSICIAN ASSISTANT

## 2024-11-20 PROCEDURE — 82728 ASSAY OF FERRITIN: CPT | Performed by: INTERNAL MEDICINE

## 2024-11-20 PROCEDURE — 94761 N-INVAS EAR/PLS OXIMETRY MLT: CPT | Mod: XB

## 2024-11-20 PROCEDURE — 96375 TX/PRO/DX INJ NEW DRUG ADDON: CPT

## 2024-11-20 PROCEDURE — P9021 RED BLOOD CELLS UNIT: HCPCS

## 2024-11-20 PROCEDURE — 86920 COMPATIBILITY TEST SPIN: CPT

## 2024-11-20 PROCEDURE — 86850 RBC ANTIBODY SCREEN: CPT

## 2024-11-20 PROCEDURE — 94799 UNLISTED PULMONARY SVC/PX: CPT

## 2024-11-20 PROCEDURE — 63600175 PHARM REV CODE 636 W HCPCS: Performed by: INTERNAL MEDICINE

## 2024-11-20 PROCEDURE — 85025 COMPLETE CBC W/AUTO DIFF WBC: CPT | Performed by: PHYSICIAN ASSISTANT

## 2024-11-20 PROCEDURE — 82803 BLOOD GASES ANY COMBINATION: CPT

## 2024-11-20 PROCEDURE — 87389 HIV-1 AG W/HIV-1&-2 AB AG IA: CPT | Performed by: PHYSICIAN ASSISTANT

## 2024-11-20 PROCEDURE — 87040 BLOOD CULTURE FOR BACTERIA: CPT | Mod: 59

## 2024-11-20 PROCEDURE — 25000003 PHARM REV CODE 250

## 2024-11-20 PROCEDURE — 25000242 PHARM REV CODE 250 ALT 637 W/ HCPCS

## 2024-11-20 PROCEDURE — 87076 CULTURE ANAEROBE IDENT EACH: CPT

## 2024-11-20 PROCEDURE — 80321 ALCOHOLS BIOMARKERS 1OR 2: CPT | Performed by: PHYSICIAN ASSISTANT

## 2024-11-20 PROCEDURE — 82570 ASSAY OF URINE CREATININE: CPT

## 2024-11-20 RX ORDER — ALBUMIN HUMAN 250 G/1000ML
25 SOLUTION INTRAVENOUS 3 TIMES DAILY
Status: DISCONTINUED | OUTPATIENT
Start: 2024-11-20 | End: 2024-11-21

## 2024-11-20 RX ORDER — LACTULOSE 10 G/15ML
20 SOLUTION ORAL 3 TIMES DAILY
Status: DISCONTINUED | OUTPATIENT
Start: 2024-11-20 | End: 2024-11-24

## 2024-11-20 RX ORDER — HYDROCODONE BITARTRATE AND ACETAMINOPHEN 500; 5 MG/1; MG/1
TABLET ORAL
Status: DISCONTINUED | OUTPATIENT
Start: 2024-11-20 | End: 2024-11-21

## 2024-11-20 RX ORDER — FUROSEMIDE 10 MG/ML
80 INJECTION INTRAMUSCULAR; INTRAVENOUS EVERY 12 HOURS
Status: DISCONTINUED | OUTPATIENT
Start: 2024-11-20 | End: 2024-11-21

## 2024-11-20 RX ORDER — SIMETHICONE 80 MG
1 TABLET,CHEWABLE ORAL 4 TIMES DAILY PRN
Status: DISCONTINUED | OUTPATIENT
Start: 2024-11-20 | End: 2024-12-31 | Stop reason: HOSPADM

## 2024-11-20 RX ORDER — TALC
6 POWDER (GRAM) TOPICAL NIGHTLY PRN
Status: DISCONTINUED | OUTPATIENT
Start: 2024-11-20 | End: 2024-12-31 | Stop reason: HOSPADM

## 2024-11-20 RX ORDER — MIDODRINE HYDROCHLORIDE 5 MG/1
5 TABLET ORAL 3 TIMES DAILY
Status: DISCONTINUED | OUTPATIENT
Start: 2024-11-20 | End: 2024-11-20

## 2024-11-20 RX ORDER — IPRATROPIUM BROMIDE AND ALBUTEROL SULFATE 2.5; .5 MG/3ML; MG/3ML
3 SOLUTION RESPIRATORY (INHALATION) EVERY 6 HOURS PRN
Status: DISCONTINUED | OUTPATIENT
Start: 2024-11-20 | End: 2024-12-31 | Stop reason: HOSPADM

## 2024-11-20 RX ORDER — FUROSEMIDE 10 MG/ML
80 INJECTION INTRAMUSCULAR; INTRAVENOUS
Status: COMPLETED | OUTPATIENT
Start: 2024-11-20 | End: 2024-11-20

## 2024-11-20 RX ORDER — CYANOCOBALAMIN (VITAMIN B-12) 250 MCG
250 TABLET ORAL DAILY
Status: DISCONTINUED | OUTPATIENT
Start: 2024-11-21 | End: 2024-11-28

## 2024-11-20 RX ORDER — CEFTRIAXONE 2 G/1
2 INJECTION, POWDER, FOR SOLUTION INTRAMUSCULAR; INTRAVENOUS
Status: DISCONTINUED | OUTPATIENT
Start: 2024-11-20 | End: 2024-11-23

## 2024-11-20 RX ORDER — HEPARIN SODIUM 5000 [USP'U]/ML
5000 INJECTION, SOLUTION INTRAVENOUS; SUBCUTANEOUS EVERY 8 HOURS
Status: DISCONTINUED | OUTPATIENT
Start: 2024-11-20 | End: 2024-11-22

## 2024-11-20 RX ORDER — ALBUTEROL SULFATE 2.5 MG/.5ML
10 SOLUTION RESPIRATORY (INHALATION)
Status: COMPLETED | OUTPATIENT
Start: 2024-11-20 | End: 2024-11-20

## 2024-11-20 RX ORDER — ONDANSETRON HYDROCHLORIDE 2 MG/ML
4 INJECTION, SOLUTION INTRAVENOUS EVERY 8 HOURS PRN
Status: DISCONTINUED | OUTPATIENT
Start: 2024-11-20 | End: 2024-12-31 | Stop reason: HOSPADM

## 2024-11-20 RX ORDER — IBUPROFEN 200 MG
24 TABLET ORAL
Status: DISCONTINUED | OUTPATIENT
Start: 2024-11-20 | End: 2024-12-31 | Stop reason: HOSPADM

## 2024-11-20 RX ORDER — SODIUM CHLORIDE 0.9 % (FLUSH) 0.9 %
10 SYRINGE (ML) INJECTION EVERY 12 HOURS PRN
Status: DISCONTINUED | OUTPATIENT
Start: 2024-11-20 | End: 2024-12-09

## 2024-11-20 RX ORDER — PANTOPRAZOLE SODIUM 40 MG/1
40 TABLET, DELAYED RELEASE ORAL DAILY
Status: DISCONTINUED | OUTPATIENT
Start: 2024-11-21 | End: 2024-11-22

## 2024-11-20 RX ORDER — MIDODRINE HYDROCHLORIDE 5 MG/1
10 TABLET ORAL 3 TIMES DAILY
Status: DISCONTINUED | OUTPATIENT
Start: 2024-11-20 | End: 2024-11-25

## 2024-11-20 RX ORDER — NALOXONE HCL 0.4 MG/ML
0.02 VIAL (ML) INJECTION
Status: DISCONTINUED | OUTPATIENT
Start: 2024-11-20 | End: 2024-12-31 | Stop reason: HOSPADM

## 2024-11-20 RX ORDER — GLUCAGON 1 MG
1 KIT INJECTION
Status: DISCONTINUED | OUTPATIENT
Start: 2024-11-20 | End: 2024-12-31 | Stop reason: HOSPADM

## 2024-11-20 RX ORDER — IBUPROFEN 200 MG
16 TABLET ORAL
Status: DISCONTINUED | OUTPATIENT
Start: 2024-11-20 | End: 2024-12-31 | Stop reason: HOSPADM

## 2024-11-20 RX ORDER — TAMSULOSIN HYDROCHLORIDE 0.4 MG/1
1 CAPSULE ORAL NIGHTLY
Status: DISCONTINUED | OUTPATIENT
Start: 2024-11-21 | End: 2024-12-15

## 2024-11-20 RX ORDER — ASCORBIC ACID 500 MG
500 TABLET ORAL DAILY
Status: DISCONTINUED | OUTPATIENT
Start: 2024-11-21 | End: 2024-11-27

## 2024-11-20 RX ORDER — ALUMINUM HYDROXIDE, MAGNESIUM HYDROXIDE, AND SIMETHICONE 1200; 120; 1200 MG/30ML; MG/30ML; MG/30ML
30 SUSPENSION ORAL 4 TIMES DAILY PRN
Status: DISCONTINUED | OUTPATIENT
Start: 2024-11-20 | End: 2024-12-31 | Stop reason: HOSPADM

## 2024-11-20 RX ADMIN — FUROSEMIDE 80 MG: 10 INJECTION, SOLUTION INTRAVENOUS at 02:11

## 2024-11-20 RX ADMIN — VANCOMYCIN HYDROCHLORIDE 2000 MG: 10 INJECTION, POWDER, LYOPHILIZED, FOR SOLUTION INTRAVENOUS at 03:11

## 2024-11-20 RX ADMIN — CEFTRIAXONE 2 G: 2 INJECTION, POWDER, FOR SOLUTION INTRAMUSCULAR; INTRAVENOUS at 09:11

## 2024-11-20 RX ADMIN — ALBUMIN (HUMAN) 25 G: 12.5 SOLUTION INTRAVENOUS at 06:11

## 2024-11-20 RX ADMIN — SODIUM ZIRCONIUM CYCLOSILICATE 10 G: 10 POWDER, FOR SUSPENSION ORAL at 05:11

## 2024-11-20 RX ADMIN — HEPARIN SODIUM 5000 UNITS: 5000 INJECTION INTRAVENOUS; SUBCUTANEOUS at 09:11

## 2024-11-20 RX ADMIN — LACTULOSE 20 G: 20 SOLUTION ORAL at 09:11

## 2024-11-20 RX ADMIN — MIDODRINE HYDROCHLORIDE 5 MG: 5 TABLET ORAL at 05:11

## 2024-11-20 RX ADMIN — MIDODRINE HYDROCHLORIDE 10 MG: 5 TABLET ORAL at 08:11

## 2024-11-20 RX ADMIN — ALBUTEROL SULFATE 10 MG: 2.5 SOLUTION RESPIRATORY (INHALATION) at 05:11

## 2024-11-20 RX ADMIN — PIPERACILLIN SODIUM AND TAZOBACTAM SODIUM 4.5 G: 4; .5 INJECTION, POWDER, FOR SOLUTION INTRAVENOUS at 03:11

## 2024-11-20 RX ADMIN — FUROSEMIDE 80 MG: 10 INJECTION, SOLUTION INTRAVENOUS at 09:11

## 2024-11-20 RX ADMIN — ALBUMIN (HUMAN) 25 G: 12.5 SOLUTION INTRAVENOUS at 08:11

## 2024-11-20 NOTE — CLINICAL REVIEW
IP Sepsis Screen (most recent)       Sepsis Screen (IP) - 11/20/24 3321       Is the patient's history or complaint suggestive of a possible infection? Yes  -TR    Are there at least two of the following signs and symptoms present? Yes  -TR    Sepsis signs/symptoms - Tachycardia Tachycardia     >90  -TR    Sepsis signs/symptoms - WBC WBC < 4,000 or WBC > 12,000  -TR    Are any of the following organ dysfunction criteria present and not considered to be due to a chronic condition? Yes  -TR    Organ Dysfunction Criteria Creatinine > 2.0  -TR    Organ Dysfunction Criteria Total Bilirubin > 2.0 Platelet count < 100,000  -TR    Organ Dysfunction Criteria Lactate > 2.0  -TR    Organ Dysfunction Criteria INR > 1.5 or aPTT > 60  -TR    Initiate Sepsis Protocol No   hepatorenal syndrome; IVAB; cultures on order -TR    Reason sepsis not considered Pt. receiving appropriate management  -TR              User Key  (r) = Recorded By, (t) = Taken By, (c) = Cosigned By      Initials Name    Alisson Zamora RN

## 2024-11-20 NOTE — AI DETERIORATION ALERT
"RAPID RESPONSE NURSE AI ALERT       AI alert received.    Chart Reviewed: 11/20/2024, 5:22 PM    MRN: [id number]  Bed: ED 36/36    Dx: <principal problem not specified>    Juan Carlos Yoo Sr. has a past medical history of Atrial fibrillation, Bulging disc, Cirrhosis, Colon polyp, EV (esophageal varices), Hypertension, Skin cancer, and Thrombocytopenia.    Last VS: BP (!) 105/57 (BP Location: Left arm, Patient Position: Lying)   Pulse 99   Temp 97.6 °F (36.4 °C) (Oral)   Resp 20   Ht 6' 1" (1.854 m)   Wt 131.5 kg (290 lb)   SpO2 98%   BMI 38.26 kg/m²     24H Vital Sign Range:  Temp:  [97.6 °F (36.4 °C)]   Pulse:  []   Resp:  [20-22]   BP: ()/(56-90)   SpO2:  [96 %-98 %]     Level of Consciousness (AVPU): alert    Recent Labs     11/20/24  1349 11/20/24  1455   WBC 12.67  --    HGB 6.7*  --    HCT 21.1* 23*   PLT 81*  --        Recent Labs     11/20/24  1349   *   K 6.1*   CL 96   CO2 20*   BUN 49*   CREATININE 5.7*   GLU 98        Recent Labs     11/20/24  1542   PH 7.412   PCO2 34.7*   PO2 40   HCO3 22.1*   POCSATURATED 76   BE -2        OXYGEN:             MEWS score:      Nica Mathews aware of soft pressure. Admitting Md currently at bedside assessing patient.  No additional concerns verbalized at this time. Instructed to call [phone] for further concerns or assistance.    Kevin Amos RN        "

## 2024-11-20 NOTE — ED TRIAGE NOTES
"Pt arriving to ED c/o Has been seen at multiple hospitals recently "and wants his medication straightened out. Making me shake" endorses SOB. Sweling noted to left hand with wrap in place to lower forearm. Pt also c/o bilateral leg pain.  "

## 2024-11-20 NOTE — ED NOTES
Patient identifiers verified and correct for Juan Carlos Yoo   LOC: The patient is aao x4  APPEARANCE: Patient appears comfortable and in no acute distress, patient is clean and well groomed.  SKIN: The skin is warm and dry, color consistent with ethnicity, patient has normal skin turgor and moist mucus membranes, +wrapping to L arm  MUSCULOSKELETAL: Patient moving all extremities spontaneously,+pain to bilateral legs, +swelling to BUE and BLE  RESPIRATORY: Airway is open and patent, respirations are spontaneous, patient has a normal effort and rate, no accessory muscle use noted, O2 sats noted at 97% on room air.  CARDIAC: Patient has a normal rate, pitting edema noted, capillary refill < 3 seconds.   GASTRO: Soft and non tender to palpation, no distention noted  : Pt denies any pain or frequency with urination.  NEURO: Pt opens eyes spontaneously, behavior appropriate to situation, follows commands, facial expression symmetrical, bilateral hand grasp equal and even, purposeful motor response noted, normal sensation in all extremities when touched with a finger.

## 2024-11-20 NOTE — ED PROVIDER NOTES
"Encounter Date: 11/20/2024       History     Chief Complaint   Patient presents with    Shortness of Breath     Has been seen at multiple hospitals recently "and wants his medication straightened out.  Making me shake"  endorses SOB.  Sweling noted to left hand with wrap in place to lower forearm     69-year-old male with a past medical history of alcoholic cirrhosis, esophageal varices, a-fib, and HTN now presenting with a chief complaint of shortness of breath and right arm pain.  Patient reports that he was recently hospitalized roughly 1 month prior for fluid overload in setting of cirrhosis and was discharged with significant changes in his medications.  Patient reports that since his medication changes he believes that he is not optimized and has had a worsening state of dyspnea on ambulation.  Patient reports diffuse swelling of his upper and lower extremities in addition to worsening dyspnea not associated with chest pain, fevers, or productive cough.  Additionally patient reports bumping his right lateral elbow on the kitchen counter 3 4 days ago which has since become red and painful, which patient placed bandage on and it is now very irritated.  Of note, patient was not taking metoprolol in 3 days.      Review of patient's allergies indicates:  No Known Allergies  Past Medical History:   Diagnosis Date    Atrial fibrillation     Bulging disc     Cirrhosis     Colon polyp 12/29/2017    Rpt 5 yrs    EV (esophageal varices)     Hypertension 3/30/2023    Skin cancer 03/2017    Thrombocytopenia      Past Surgical History:   Procedure Laterality Date    CATHETERIZATION OF BOTH LEFT AND RIGHT HEART N/A 2/8/2023    Procedure: CATHETERIZATION, HEART, BOTH LEFT AND RIGHT;  Surgeon: Flo Malone MD;  Location: [facility] CATH LAB;  Service: Cardiology;  Laterality: N/A;  low bleeding risk 1.0%    CHOLECYSTECTOMY      COLONOSCOPY      COLONOSCOPY N/A 12/29/2017    ta rpt 2022    CORONARY ANGIOGRAPHY N/A 2/8/2023    " Procedure: ANGIOGRAM, CORONARY ARTERY;  Surgeon: Flo Malone MD;  Location: [facility] CATH LAB;  Service: Cardiology;  Laterality: N/A;    HERNIA REPAIR      SKIN BIOPSY  03/2017    TONSILLECTOMY      UPPER GASTROINTESTINAL ENDOSCOPY  2011    EV    UPPER GASTROINTESTINAL ENDOSCOPY  2016    VASECTOMY       Family History   Problem Relation Name Age of Onset    Hyperlipidemia Brother      Cancer Maternal Uncle          Leukemia    Heart disease Maternal Uncle      Ovarian cancer Sister      Colon cancer Neg Hx       Social History     Tobacco Use    Smoking status: Never    Smokeless tobacco: Never   Substance Use Topics    Alcohol use: Not Currently     Alcohol/week: 0.0 standard drinks of alcohol    Drug use: No     Review of Systems  See HPI     Physical Exam     Initial Vitals [11/20/24 1226]   BP Pulse Resp Temp SpO2   (!) 129/90 94 (!) 22 97.6 °F (36.4 °C) 96 %      MAP       --         Physical Exam    Nursing note and vitals reviewed.      Gen: AxOx3, well nourished, appears stated age, no pallor, no jaundice, appears well hydrated  Eye: EOMI, no scleral icterus, no periorbital edema or ecchymosis  Head: Normocephalic, atraumatic, no lesions, scalp appears normal  ENT: Neck supple, no stridor, no masses, no drooling or voice changes  CVS: All distal pulses intact with normal rate and rhythm, no JVD, normal S1/S2, no murmur  Pulm: No increased work of breathing with bibasilar crackles without wheeze  Abd:  Nondistended, soft, nontender, no organomegaly, no CVAT  Ext:  Bilateral upper and lower extremity edema 2+ and pitting  Erythematous right upper extremity over biceps with tenderness to palpation without palpable cord and no palpable crepitus  Neuro: GCS15, moving all extremities, gait intact, face grossly symmetric  Psych: normal affect, cooperative, well groomed, makes good eye contact      ED Course   Procedures  Labs Reviewed   B-TYPE NATRIURETIC PEPTIDE - Abnormal       Result Value     (*)     CBC W/ AUTO DIFFERENTIAL - Abnormal    WBC 12.67      RBC 2.82 (*)     Hemoglobin 6.7 (*)     Hematocrit 21.1 (*)     MCV 75 (*)     MCH 23.8 (*)     MCHC 31.8 (*)     RDW 19.7 (*)     Platelets 81 (*)     MPV 11.3      Immature Granulocytes 0.6 (*)     Gran # (ANC) 11.1 (*)     Immature Grans (Abs) 0.07 (*)     Lymph # 0.3 (*)     Mono # 1.2 (*)     Eos # 0.0      Baso # 0.01      nRBC 0      Gran % 87.4 (*)     Lymph % 2.6 (*)     Mono % 9.3      Eosinophil % 0.0      Basophil % 0.1      Differential Method Automated     COMPREHENSIVE METABOLIC PANEL - Abnormal    Sodium 128 (*)     Potassium 6.1 (*)     Chloride 96      CO2 20 (*)     Glucose 98      BUN 49 (*)     Creatinine 5.7 (*)     Calcium 9.6      Total Protein 7.6      Albumin 2.8 (*)     Total Bilirubin 4.8 (*)     Alkaline Phosphatase 47      AST 35      ALT 9 (*)     eGFR 10.0 (*)     Anion Gap 12     AMMONIA - Abnormal    Ammonia 104 (*)    URINALYSIS, REFLEX TO URINE CULTURE - Abnormal    Specimen UA Urine, Catheterized      Color, UA Yellow      Appearance, UA Hazy (*)     pH, UA 6.0      Specific Gravity, UA 1.015      Protein, UA Trace (*)     Glucose, UA Negative      Ketones, UA Negative      Bilirubin (UA) Negative      Occult Blood UA Negative      Nitrite, UA Negative      Leukocytes, UA 3+ (*)     Narrative:     Specimen Source->Urine   SODIUM, URINE, RANDOM - Abnormal    Sodium, Urine <10 (*)     Narrative:     Specimen Source->Urine   PROTEIN / CREATININE RATIO, URINE - Abnormal    Protein, Urine Random 30 (*)     Creatinine, Urine 166.0      Prot/Creat Ratio, Urine 0.18      Narrative:     Specimen Source->Urine   PROTIME-INR - Abnormal    Prothrombin Time 22.6 (*)     INR 2.2 (*)    LACTIC ACID, PLASMA - Abnormal    Lactate (Lactic Acid) 2.9 (*)    URINALYSIS MICROSCOPIC - Abnormal    RBC, UA 4      WBC, UA >100 (*)     WBC Clumps, UA Occasional (*)     Squam Epithel, UA 0      Hyaline Casts, UA 4 (*)     Microscopic Comment SEE  COMMENT      Narrative:     Specimen Source->Urine   ISTAT LACTATE - Abnormal    POC Lactate 3.94 (*)     Sample VENOUS      Site Other      Allens Test N/A     ISTAT PROCEDURE - Abnormal    POC Glucose 101      POC BUN 45 (*)     POC Creatinine 6.5 (*)     POC Sodium 131 (*)     POC Potassium 5.6 (*)     POC Chloride 96      POC TCO2 (MEASURED) 21 (*)     POC Ionized Calcium 1.05 (*)     POC Hematocrit 23 (*)     Sample BLU     ISTAT PROCEDURE - Abnormal    POC PH 7.412      POC PCO2 34.7 (*)     POC PO2 40      POC HCO3 22.1 (*)     POC BE -2      POC SATURATED O2 76      POC TCO2 23 (*)     Sample VENOUS      Site Other      Allens Test N/A     ISTAT LACTATE - Abnormal    POC Lactate 3.42 (*)     Sample VENOUS      Site Other      Allens Test N/A     CULTURE, BLOOD   CULTURE, BLOOD   CULTURE, STOOL   CLOSTRIDIUM DIFFICILE   CULTURE, AEROBIC  (SPECIFY SOURCE)   CULTURE, ANAEROBIC   GRAM STAIN   CULTURE, URINE   HEPATITIS C ANTIBODY    Hepatitis C Ab Non-reactive      Narrative:     Release to patient->Immediate   HIV 1 / 2 ANTIBODY    HIV 1/2 Ag/Ab Non-reactive      Narrative:     Release to patient->Immediate   TROPONIN I    Troponin I 0.019     PHOSPHATIDYLETHANOL (PETH)   BASIC METABOLIC PANEL   WBC, STOOL   PHOSPHATIDYLETHANOL (PETH)   ALBUMIN, PERITONEAL, PLEURAL FLUID OR LINA DRAINAGE, IN-HOUSE   PROTEIN, PERITONEAL, PLEURAL FLUID OR LINA DRAINAGE, IN-HOUSE   LDH, PERITONEAL, PLEURAL FLUID OR LINA DRAINAGE, IN-HOUSE   WBC & DIFF, BODY FLUID   TYPE & SCREEN    Group & Rh A POS      Indirect Kristin NEG      Specimen Outdate 11/23/2024 23:59     ISTAT CHEM8   PREPARE RBC SOFT    UNIT NUMBER [id number]      Product Code [id number]      DISPENSE STATUS ISSUED      CODING SYSTEM [id number]      Unit Blood Type Code 6200      Unit Blood Type A POS      Unit Expiration [id number]      CROSSMATCH INTERPRETATION Compatible          ECG Results              EKG 12-lead (Final result)        Collection Time Result Time QRS  Duration OHS QTC Calculation    11/20/24 12:32:14 11/20/24 14:57:57 94 444                     Final result by Interface, Lab In [facility] (11/20/24 14:58:00)                   Narrative:    Test Reason : R06.02,    Vent. Rate : 104 BPM     Atrial Rate :    BPM     P-R Int :    ms          QRS Dur :  94 ms      QT Int : 338 ms       P-R-T Axes :     66 218 degrees    QTcB Int : 444 ms    Atrial fibrillation with rapid ventricular response  Low voltage QRS  Nonspecific T wave abnormality  Abnormal ECG  When compared to EKG from 10/4/24:  No significant change was found    Confirmed by Lonnie Jimenez ([id number]) on 11/20/2024 2:57:53 PM    Referred By:            Confirmed By: Lonnie Jimenez                                  Imaging Results              US Upper Extremity Veins Right (Final result)  Result time 11/20/24 16:16:39      Final result by Lennox Garsia MD (11/20/24 16:16:39)                   Impression:      No thrombus in central veins of the right upper extremity.      Electronically signed by: Lennox Garsia  Date:    11/20/2024  Time:    16:16               Narrative:    EXAMINATION:  US UPPER EXTREMITY VEINS RIGHT    CLINICAL HISTORY:  Right upper extremity swelling;    TECHNIQUE:  Duplex and color flow Doppler evaluation and dynamic compression was performed of the right upper extremity veins.    COMPARISON:  None    FINDINGS:  Central veins: The internal jugular, subclavian, and axillary veins are patent and free of thrombus.    Arm veins: The brachial, basilic, and cephalic veins are patent and compressible.    Contralateral subclavian/internal jugular veins: The left subclavian and internal jugular veins are patent and free of thrombus.    Other findings: None.                                       X-Ray Chest AP Portable (Final result)  Result time 11/20/24 14:36:09      Final result by Velma Buckley MD (11/20/24 14:36:09)                   Impression:      As above      Electronically  signed by: Velma Buckley MD  Date:    11/20/2024  Time:    14:36               Narrative:    EXAMINATION:  XR CHEST AP PORTABLE    CLINICAL HISTORY:  Shortness of breath    TECHNIQUE:  Single frontal view of the chest was performed.    COMPARISON:  October 4, 2024    FINDINGS:  Portable technique combined with overlying soft tissues partly degrades image quality in the lower chest.    Cardiac size is enlarged similar to prior.  No large volume of pleural fluid noted although there is mild blunting of the right costophrenic angle which may relate to a small amount of pleural fluid.  Suspected right basilar opacity, to be correlated clinically for infection.  Two view chest could be helpful for better assessment.                                       Medications   0.9%  NaCl infusion (for blood administration) (has no administration in time range)   ascorbic acid (vitamin C) tablet 500 mg (has no administration in time range)   cyanocobalamin tablet 250 mcg (has no administration in time range)   pantoprazole EC tablet 40 mg (has no administration in time range)   tamsulosin 24 hr capsule 0.4 mg (has no administration in time range)   sodium chloride 0.9% flush 10 mL (has no administration in time range)   albuterol-ipratropium 2.5 mg-0.5 mg/3 mL nebulizer solution 3 mL (has no administration in time range)   melatonin tablet 6 mg (has no administration in time range)   ondansetron injection 4 mg (has no administration in time range)   simethicone chewable tablet 80 mg (has no administration in time range)   aluminum-magnesium hydroxide-simethicone 200-200-20 mg/5 mL suspension 30 mL (has no administration in time range)   naloxone 0.4 mg/mL injection 0.02 mg (has no administration in time range)   glucose chewable tablet 16 g (has no administration in time range)   glucose chewable tablet 24 g (has no administration in time range)   glucagon (human recombinant) injection 1 mg (has no administration in time range)    heparin (porcine) injection 5,000 Units (5,000 Units Subcutaneous Given 11/20/24 2157)   dextrose 10% bolus 125 mL 125 mL (has no administration in time range)   dextrose 10% bolus 250 mL 250 mL (has no administration in time range)   vancomycin - pharmacy to dose (has no administration in time range)   lactulose 20 gram/30 mL solution Soln 20 g (20 g Oral Given 11/20/24 2157)   furosemide injection 80 mg (80 mg Intravenous Given 11/20/24 2156)   albumin human 25% bottle 25 g (0 g Intravenous Stopped 11/20/24 2144)   midodrine tablet 10 mg (10 mg Oral Given 11/20/24 2028)   cefTRIAXone injection 2 g (2 g Intravenous Given 11/20/24 2157)   piperacillin-tazobactam (ZOSYN) 4.5 g in D5W 100 mL IVPB (MB+) (0 g Intravenous Stopped 11/20/24 1542)   vancomycin 2 g in dextrose 5 % 500 mL IVPB (0 mg Intravenous Stopped 11/20/24 1801)   furosemide injection 80 mg (80 mg Intravenous Given 11/20/24 1459)   albuterol sulfate nebulizer solution 10 mg (10 mg Nebulization Given 11/20/24 1747)   sodium zirconium cyclosilicate packet 10 g (10 g Oral Given 11/20/24 1727)     Medical Decision Making  Initial assessment  69-year-old male with a past medical history of alcoholic cirrhosis, esophageal varices, a-fib, and HTN now presenting with a chief complaint of shortness of breath and right arm pain. Patient is able to vocalise, breathing spontaneously, hemodynamically stable, oriented, moving all 4 limbs spontaneously.  Examination consistent with bilateral upper and lower extremity swelling with bibasilar crackles in addition to tenderness and erythema surrounding upper portion of upper right extremity.      Differential diagnosis  Decompensated liver cirrhosis  Electrolyte/metabolic derangements  Sepsis with shock  Cellulitis  CHF exacerbation  ACS      ED management  On presentation patient initially had blood pressure 89/62 with concern for septic shock versus decompensated liver failure.  Given clinical picture of diffuse  overload a bedside echo was performed showing distended IVC 2.8cm and plethoric.  Given cellulitic appearance bedside ultrasound was performed showing cobblestoning highly suspicious for cellulitis and broad spectrum Abx given.   Workup consistent with Bun 49/ Cr 5.7 which is new with K 5.6 80iv lasix given. Initial lactate 3.9 but no fluids administered given his overload status.  DVT ultrasound of right upper extremity was negative.  Following ED workup there was concern for hepatorenal syndrome with fluid overload likely driven by sepsis in setting of cellulitis.  Patient was discussed with Hospital Medicine and admitted to their service.    This patient does have evidence of infective focus  My overall impression is sepsis.  Source: Skin and Soft Tissue (location RUE)  Antibiotics given- Antibiotics (72h ago, onward)    Start     Stop Route Frequency Ordered    11/20/24 2100  cefTRIAXone injection 2 g         -- IV Every 24 hours (non-standard times) 11/20/24 1848      Latest lactate reviewed-  Lab             11/20/24 11/20/24                       1935          2210          LACTATE       --          3.5*          POCLAC       3.42*         --           Organ dysfunction indicated by hypotension    Fluid challenge Contraindicated- Fluid bolus is contraindicated in this patient due to Volume overload due to- hepatorenal syndrome     Post- resuscitation assessment No - Post resuscitation assessment not needed       Will Not start Pressors- Levophed for MAP of 65  Source control achieved by:  Vancomycin/Zosyn      Amount and/or Complexity of Data Reviewed  Labs: ordered. Decision-making details documented in ED Course.  Radiology:  Decision-making details documented in ED Course.  ECG/medicine tests:  Decision-making details documented in ED Course.    Risk  Prescription drug management.  Decision regarding hospitalization.               ED Course as of 11/20/24 2330   Wed Nov 20, 2024   1359 BP(!): 95/56  [PM]   1359 Pulse: 107 [PM]   1359 Resp(!): 22 [PM]   1359 SpO2: 96 % [PM]   1432 WBC: 12.67 [PM]   1432 Hemoglobin(!): 6.7 [PM]   1432 Platelet Count(!): 81 [PM]   1432 Sodium(!): 128 [PM]   1432 Potassium(!): 6.1 [PM]   1432 BUN(!): 49 [PM]   1432 Creatinine(!): 5.7 [PM]   1432 ALP: 47 [PM]   1432 AST: 35 [PM]   1436 EKG 12-lead  As per my independent interpretation atrial fibrillation with rate 104 without ST depressions or elevations [PM]   1437 Troponin I: 0.019 [PM]   1437 BNP(!): 363 [PM]   1437 X-Ray Chest AP Portable  As per my independent interpretation pulmonary edema [PM]   1507 POC Lactate(!!): 3.94  Patient was fluid overloaded and will not give additional fluid [PM]   1534 POC Potassium(!): 5.6  Giving Lasix.  No peaked T-waves.  Will give albuterol [PM]      ED Course User Index  [PM] Renetta Tony MD                           Clinical Impression:  Final diagnoses:  [R06.02] Shortness of breath  [K76.7] Hepatorenal failure  [E87.5] Hyperkalemia (Primary)  [A41.9] Sepsis, due to unspecified organism, unspecified whether acute organ dysfunction present  [L03.90] Cellulitis, unspecified cellulitis site          ED Disposition Condition    Admit Stable                Renetta Tony MD  Resident  11/20/24 2787

## 2024-11-20 NOTE — Clinical Note
Diagnosis: Hepatorenal failure [552829]   Reason for IP Medical Treatment  (Clinical interventions that can only be accomplished in the IP setting? ) :: sepsis in setting of hepatorenal syndrome

## 2024-11-20 NOTE — ED NOTES
Bed: [facility]  Expected date:   Expected time:   Means of arrival:   Comments:

## 2024-11-20 NOTE — FIRST PROVIDER EVALUATION
" Emergency Department TeleTriage Encounter Note      CHIEF COMPLAINT    Chief Complaint   Patient presents with    Shortness of Breath     Has been seen at multiple hospitals recently "and wants his medication straightened out.  Making me shake"  endorses SOB.  Sweling noted to left hand with wrap in place to lower forearm       VITAL SIGNS   Initial Vitals [11/20/24 1226]   BP Pulse Resp Temp SpO2   (!) 129/90 94 (!) 22 97.6 °F (36.4 °C) 96 %      MAP       --            ALLERGIES    Review of patient's allergies indicates:  No Known Allergies    PROVIDER TRIAGE NOTE  Patient presents with shortness of breath and edema. No chest pain. He was seen at [facility] 2 weeks ago and thinks he was given the wrong medications.       ORDERS  Labs Reviewed   HEPATITIS C ANTIBODY   HIV 1 / 2 ANTIBODY       ED Orders (720h ago, onward)      Start Ordered     Status Ordering Provider    11/20/24 1232 11/20/24 1231  EKG 12-lead  Once         Completed by JORDAN JACOBO on 11/20/2024 at 12:35 PM CYNDI WATKINS    11/20/24 1231 11/20/24 1230  Hepatitis C Antibody  STAT         Ordered AGUTSIN CHÁVEZ    11/20/24 1231 11/20/24 1230  HIV 1/2 Ag/Ab (4th Gen)  STAT         Ordered AGUSTIN CHÁVEZ              Virtual Visit Note: The provider triage portion of this emergency department evaluation and documentation was performed via [employer], a HIPAA-compliant telemedicine application, in concert with a tele-presenter in the room. A face to face patient evaluation with one of my colleagues will occur once the patient is placed in an emergency department room.      DISCLAIMER: This note was prepared with [employer]ron[employer] voice recognition transcription software. Garbled syntax, mangled pronouns, and other bizarre constructions may be attributed to that software system.    "

## 2024-11-21 PROBLEM — G93.41 ENCEPHALOPATHY, METABOLIC: Status: ACTIVE | Noted: 2024-11-21

## 2024-11-21 PROBLEM — D69.6 THROMBOCYTOPENIA: Status: ACTIVE | Noted: 2024-11-21

## 2024-11-21 PROBLEM — E87.5 HYPERKALEMIA: Status: ACTIVE | Noted: 2024-11-21

## 2024-11-21 PROBLEM — E87.1 HYPONATREMIA: Chronic | Status: ACTIVE | Noted: 2024-11-21

## 2024-11-21 PROBLEM — R65.20 SEVERE SEPSIS: Status: ACTIVE | Noted: 2024-11-20

## 2024-11-21 LAB
ACINETOBACTER CALCOACETICUS/BAUMANNII COMPLEX: NOT DETECTED
AFP SERPL-MCNC: 5.5 NG/ML (ref 0–8.4)
ALBUMIN SERPL BCP-MCNC: 3.2 G/DL (ref 3.5–5.2)
ALP SERPL-CCNC: 45 U/L (ref 40–150)
ALT SERPL W/O P-5'-P-CCNC: 9 U/L (ref 10–44)
ANION GAP SERPL CALC-SCNC: 13 MMOL/L (ref 8–16)
ANION GAP SERPL CALC-SCNC: 16 MMOL/L (ref 8–16)
ANISOCYTOSIS BLD QL SMEAR: SLIGHT
AST SERPL-CCNC: 27 U/L (ref 10–40)
AV AREA BY CONTINUOUS VTI: 2.2 CM2
AV INDEX (PROSTH): 0.6
AV LVOT MEAN GRADIENT: 2 MMHG
AV LVOT PEAK GRADIENT: 3 MMHG
AV MEAN GRADIENT: 5.1 MMHG
AV PEAK GRADIENT: 10.2 MMHG
AV VALVE AREA BY VELOCITY RATIO: 2.1 CM²
AV VALVE AREA: 2.3 CM2
AV VELOCITY RATIO: 0.56
BACTEROIDES FRAGILIS: NOT DETECTED
BASOPHILS # BLD AUTO: 0.03 K/UL (ref 0–0.2)
BASOPHILS NFR BLD: 0.2 % (ref 0–1.9)
BILIRUB SERPL-MCNC: 7.4 MG/DL (ref 0.1–1)
BSA FOR ECHO PROCEDURE: 2.81 M2
BUN SERPL-MCNC: 48 MG/DL (ref 8–23)
BUN SERPL-MCNC: 52 MG/DL (ref 8–23)
BURR CELLS BLD QL SMEAR: ABNORMAL
CALCIUM SERPL-MCNC: 9.8 MG/DL (ref 8.7–10.5)
CALCIUM SERPL-MCNC: 9.9 MG/DL (ref 8.7–10.5)
CANCER AG19-9 SERPL-ACNC: 6.2 U/ML (ref 0–40)
CANDIDA ALBICANS: NOT DETECTED
CANDIDA AURIS: NOT DETECTED
CANDIDA GLABRATA: NOT DETECTED
CANDIDA KRUSEI: NOT DETECTED
CANDIDA PARAPSILOSIS: NOT DETECTED
CANDIDA TROPICALIS: NOT DETECTED
CHLORIDE SERPL-SCNC: 95 MMOL/L (ref 95–110)
CHLORIDE SERPL-SCNC: 96 MMOL/L (ref 95–110)
CO2 SERPL-SCNC: 18 MMOL/L (ref 23–29)
CO2 SERPL-SCNC: 22 MMOL/L (ref 23–29)
CREAT SERPL-MCNC: 5.9 MG/DL (ref 0.5–1.4)
CREAT SERPL-MCNC: 6.3 MG/DL (ref 0.5–1.4)
CRYPTOCOCCUS NEOFORMANS/GATTII: NOT DETECTED
CTX-M GENE (ESBL PRODUCER): NORMAL
CV ECHO LV RWT: 0.38 CM
DIFFERENTIAL METHOD BLD: ABNORMAL
DOP CALC AO PEAK VEL: 1.6 M/S
DOP CALC AO VTI: 33 CM
DOP CALC LVOT AREA: 3.8 CM2
DOP CALC LVOT DIAMETER: 2.2 CM
DOP CALC LVOT PEAK VEL: 0.9 M/S
DOP CALC LVOT STROKE VOLUME: 75.6 CM3
DOP CALCLVOT PEAK VEL VTI: 19.9 CM
E/E' RATIO: 12.08 M/S
ECHO EF ESTIMATED: 57 %
ECHO LV POSTERIOR WALL: 1 CM (ref 0.6–1.1)
EJECTION FRACTION: 58 %
ENTEROBACTER CLOACAE COMPLEX: NOT DETECTED
ENTEROBACTERALES: NOT DETECTED
ENTEROCOCCUS FAECALIS: NOT DETECTED
ENTEROCOCCUS FAECIUM: NOT DETECTED
EOSINOPHIL # BLD AUTO: 0 K/UL (ref 0–0.5)
EOSINOPHIL NFR BLD: 0.1 % (ref 0–8)
ERYTHROCYTE [DISTWIDTH] IN BLOOD BY AUTOMATED COUNT: 20.1 % (ref 11.5–14.5)
ERYTHROCYTE [DISTWIDTH] IN BLOOD BY AUTOMATED COUNT: 20.1 % (ref 11.5–14.5)
ESCHERICHIA COLI: NOT DETECTED
EST. GFR  (NO RACE VARIABLE): 8.8 ML/MIN/1.73 M^2
EST. GFR  (NO RACE VARIABLE): 9.6 ML/MIN/1.73 M^2
FERRITIN SERPL-MCNC: 36 NG/ML (ref 20–300)
FOLATE SERPL-MCNC: 6.1 NG/ML (ref 4–24)
FRACTIONAL SHORTENING: 28.8 % (ref 28–44)
GLUCOSE SERPL-MCNC: 114 MG/DL (ref 70–110)
GLUCOSE SERPL-MCNC: 98 MG/DL (ref 70–110)
HAEMOPHILUS INFLUENZAE: NOT DETECTED
HCT VFR BLD AUTO: 21 % (ref 40–54)
HCT VFR BLD AUTO: 21.5 % (ref 40–54)
HGB BLD-MCNC: 6.8 G/DL (ref 14–18)
HGB BLD-MCNC: 7 G/DL (ref 14–18)
HYPOCHROMIA BLD QL SMEAR: ABNORMAL
IMM GRANULOCYTES # BLD AUTO: 0.14 K/UL (ref 0–0.04)
IMM GRANULOCYTES NFR BLD AUTO: 1 % (ref 0–0.5)
IMP GENE (CARBAPENEM RESISTANT): NORMAL
INR PPP: 2.1 (ref 0.8–1.2)
INTERVENTRICULAR SEPTUM: 0.9 CM (ref 0.6–1.1)
KLEBSIELLA AEROGENES: NOT DETECTED
KLEBSIELLA OXYTOCA: NOT DETECTED
KLEBSIELLA PNEUMONIAE GROUP: NOT DETECTED
KPC RESISTANCE GENE (CARBAPENEM): NORMAL
LA MAJOR: 7.36 CM
LA MINOR: 6.65 CM
LA WIDTH: 5.4 CM
LACTATE SERPL-SCNC: 1.9 MMOL/L (ref 0.5–2.2)
LEFT ATRIUM SIZE: 6.15 CM
LEFT ATRIUM VOLUME INDEX MOD: 58.3 ML/M2
LEFT ATRIUM VOLUME INDEX: 73.3 ML/M2
LEFT ATRIUM VOLUME MOD: 156.9 ML
LEFT ATRIUM VOLUME: 197.23 CM3
LEFT INTERNAL DIMENSION IN SYSTOLE: 3.7 CM (ref 2.1–4)
LEFT VENTRICLE DIASTOLIC VOLUME INDEX: 49.11 ML/M2
LEFT VENTRICLE DIASTOLIC VOLUME: 132.1 ML
LEFT VENTRICLE MASS INDEX: 67.4 G/M2
LEFT VENTRICLE SYSTOLIC VOLUME INDEX: 21.2 ML/M2
LEFT VENTRICLE SYSTOLIC VOLUME: 57.03 ML
LEFT VENTRICULAR INTERNAL DIMENSION IN DIASTOLE: 5.2 CM (ref 3.5–6)
LEFT VENTRICULAR MASS: 181.4 G
LISTERIA MONOCYTOGENES: NOT DETECTED
LV LATERAL E/E' RATIO: 11.21 M/S
LV SEPTAL E/E' RATIO: 13.08 M/S
LYMPHOCYTES # BLD AUTO: 0.7 K/UL (ref 1–4.8)
LYMPHOCYTES NFR BLD: 4.9 % (ref 18–48)
MAGNESIUM SERPL-MCNC: 2.4 MG/DL (ref 1.6–2.6)
MCH RBC QN AUTO: 24 PG (ref 27–31)
MCH RBC QN AUTO: 24.3 PG (ref 27–31)
MCHC RBC AUTO-ENTMCNC: 32.4 G/DL (ref 32–36)
MCHC RBC AUTO-ENTMCNC: 32.6 G/DL (ref 32–36)
MCR-1: NORMAL
MCV RBC AUTO: 74 FL (ref 82–98)
MCV RBC AUTO: 75 FL (ref 82–98)
MEC A/C AND MREJ (MRSA): NORMAL
MEC A/C: NORMAL
MONOCYTES # BLD AUTO: 1.2 K/UL (ref 0.3–1)
MONOCYTES NFR BLD: 8.7 % (ref 4–15)
MV PEAK E VEL: 1.57 M/S
NDM GENE (CARBAPENEM RESISTANT): NORMAL
NEISSERIA MENINGITIDIS: NOT DETECTED
NEUTROPHILS # BLD AUTO: 12 K/UL (ref 1.8–7.7)
NEUTROPHILS NFR BLD: 85.1 % (ref 38–73)
NRBC BLD-RTO: 0 /100 WBC
OHS CV RV/LV RATIO: 0.94 CM
OHS LV EJECTION FRACTION SIMPSONS BIPLANE MOD: 63 %
OVALOCYTES BLD QL SMEAR: ABNORMAL
OXA-48-LIKE (CARBAPENEM RESISTANT): NORMAL
PHOSPHATE SERPL-MCNC: 5.9 MG/DL (ref 2.7–4.5)
PISA TR MAX VEL: 2.91 M/S
PLATELET # BLD AUTO: 66 K/UL (ref 150–450)
PLATELET # BLD AUTO: 67 K/UL (ref 150–450)
PLATELET BLD QL SMEAR: ABNORMAL
PMV BLD AUTO: 10.7 FL (ref 9.2–12.9)
PMV BLD AUTO: 11.8 FL (ref 9.2–12.9)
POIKILOCYTOSIS BLD QL SMEAR: ABNORMAL
POLYCHROMASIA BLD QL SMEAR: ABNORMAL
POTASSIUM SERPL-SCNC: 5 MMOL/L (ref 3.5–5.1)
POTASSIUM SERPL-SCNC: 5.6 MMOL/L (ref 3.5–5.1)
PROT SERPL-MCNC: 7.5 G/DL (ref 6–8.4)
PROTEUS SPECIES: NOT DETECTED
PROTHROMBIN TIME: 22 SEC (ref 9–12.5)
PSEUDOMONAS AERUGINOSA: NOT DETECTED
RA MAJOR: 6.91 CM
RA PRESSURE ESTIMATED: 15 MMHG
RA WIDTH: 5.56 CM
RBC # BLD AUTO: 2.83 M/UL (ref 4.6–6.2)
RBC # BLD AUTO: 2.88 M/UL (ref 4.6–6.2)
RIGHT ATRIUM VOLUME AREA LENGTH APICAL 4 CHAMBER: 121 ML
RIGHT VENTRICLE DIASTOLIC BASEL DIMENSION: 4.9 CM
RV TB RVSP: 18 MMHG
SALMONELLA SP: NOT DETECTED
SCHISTOCYTES BLD QL SMEAR: ABNORMAL
SCHISTOCYTES BLD QL SMEAR: PRESENT
SERRATIA MARCESCENS: NOT DETECTED
SINUS: 2.8 CM
SODIUM SERPL-SCNC: 130 MMOL/L (ref 136–145)
SODIUM SERPL-SCNC: 130 MMOL/L (ref 136–145)
STAPHYLOCOCCUS AUREUS: NOT DETECTED
STAPHYLOCOCCUS EPIDERMIDIS: NOT DETECTED
STAPHYLOCOCCUS LUGDUNESIS: NOT DETECTED
STAPHYLOCOCCUS SPECIES: NOT DETECTED
STENOTROPHOMONAS MALTOPHILIA: NOT DETECTED
STJ: 2.97 CM
STREPTOCOCCUS AGALACTIAE: NOT DETECTED
STREPTOCOCCUS PNEUMONIAE: NOT DETECTED
STREPTOCOCCUS PYOGENES: NOT DETECTED
STREPTOCOCCUS SPECIES: NOT DETECTED
TARGETS BLD QL SMEAR: ABNORMAL
TDI LATERAL: 0.14 M/S
TDI SEPTAL: 0.12 M/S
TDI: 0.13 M/S
TR MAX PG: 34 MMHG
TRICUSPID ANNULAR PLANE SYSTOLIC EXCURSION: 1.7 CM
TV PEAK GRADIENT: 34 MMHG
TV REST PULMONARY ARTERY PRESSURE: 49 MMHG
VAN A/B (VRE GENE): NORMAL
VANCOMYCIN SERPL-MCNC: 8.6 UG/ML
VIM GENE (CARBAPENEM RESISTANT): NORMAL
VIT B12 SERPL-MCNC: >2000 PG/ML (ref 210–950)
WBC # BLD AUTO: 14.06 K/UL (ref 3.9–12.7)
WBC # BLD AUTO: 6.21 K/UL (ref 3.9–12.7)
Z-SCORE OF LEFT VENTRICULAR DIMENSION IN END DIASTOLE: -15.58
Z-SCORE OF LEFT VENTRICULAR DIMENSION IN END SYSTOLE: -10.71

## 2024-11-21 PROCEDURE — 83605 ASSAY OF LACTIC ACID: CPT | Performed by: STUDENT IN AN ORGANIZED HEALTH CARE EDUCATION/TRAINING PROGRAM

## 2024-11-21 PROCEDURE — 36415 COLL VENOUS BLD VENIPUNCTURE: CPT | Performed by: STUDENT IN AN ORGANIZED HEALTH CARE EDUCATION/TRAINING PROGRAM

## 2024-11-21 PROCEDURE — 25000003 PHARM REV CODE 250: Performed by: STUDENT IN AN ORGANIZED HEALTH CARE EDUCATION/TRAINING PROGRAM

## 2024-11-21 PROCEDURE — 85027 COMPLETE CBC AUTOMATED: CPT | Performed by: STUDENT IN AN ORGANIZED HEALTH CARE EDUCATION/TRAINING PROGRAM

## 2024-11-21 PROCEDURE — 20600001 HC STEP DOWN PRIVATE ROOM

## 2024-11-21 PROCEDURE — 83735 ASSAY OF MAGNESIUM: CPT | Performed by: INTERNAL MEDICINE

## 2024-11-21 PROCEDURE — 80048 BASIC METABOLIC PNL TOTAL CA: CPT | Mod: XB | Performed by: STUDENT IN AN ORGANIZED HEALTH CARE EDUCATION/TRAINING PROGRAM

## 2024-11-21 PROCEDURE — 99223 1ST HOSP IP/OBS HIGH 75: CPT | Mod: ,,, | Performed by: INTERNAL MEDICINE

## 2024-11-21 PROCEDURE — 85025 COMPLETE CBC W/AUTO DIFF WBC: CPT | Performed by: INTERNAL MEDICINE

## 2024-11-21 PROCEDURE — 86301 IMMUNOASSAY TUMOR CA 19-9: CPT | Performed by: STUDENT IN AN ORGANIZED HEALTH CARE EDUCATION/TRAINING PROGRAM

## 2024-11-21 PROCEDURE — 63600175 PHARM REV CODE 636 W HCPCS: Performed by: INTERNAL MEDICINE

## 2024-11-21 PROCEDURE — 80053 COMPREHEN METABOLIC PANEL: CPT | Performed by: INTERNAL MEDICINE

## 2024-11-21 PROCEDURE — 97165 OT EVAL LOW COMPLEX 30 MIN: CPT

## 2024-11-21 PROCEDURE — 86920 COMPATIBILITY TEST SPIN: CPT | Performed by: STUDENT IN AN ORGANIZED HEALTH CARE EDUCATION/TRAINING PROGRAM

## 2024-11-21 PROCEDURE — 25000003 PHARM REV CODE 250: Performed by: INTERNAL MEDICINE

## 2024-11-21 PROCEDURE — 36415 COLL VENOUS BLD VENIPUNCTURE: CPT | Performed by: INTERNAL MEDICINE

## 2024-11-21 PROCEDURE — 63600175 PHARM REV CODE 636 W HCPCS: Mod: JZ,JG | Performed by: STUDENT IN AN ORGANIZED HEALTH CARE EDUCATION/TRAINING PROGRAM

## 2024-11-21 PROCEDURE — 84100 ASSAY OF PHOSPHORUS: CPT | Performed by: INTERNAL MEDICINE

## 2024-11-21 PROCEDURE — 80202 ASSAY OF VANCOMYCIN: CPT | Performed by: STUDENT IN AN ORGANIZED HEALTH CARE EDUCATION/TRAINING PROGRAM

## 2024-11-21 PROCEDURE — 85610 PROTHROMBIN TIME: CPT | Performed by: INTERNAL MEDICINE

## 2024-11-21 PROCEDURE — P9047 ALBUMIN (HUMAN), 25%, 50ML: HCPCS | Mod: JZ,JG | Performed by: STUDENT IN AN ORGANIZED HEALTH CARE EDUCATION/TRAINING PROGRAM

## 2024-11-21 PROCEDURE — 99223 1ST HOSP IP/OBS HIGH 75: CPT | Mod: ,,, | Performed by: STUDENT IN AN ORGANIZED HEALTH CARE EDUCATION/TRAINING PROGRAM

## 2024-11-21 PROCEDURE — 97110 THERAPEUTIC EXERCISES: CPT

## 2024-11-21 PROCEDURE — 97162 PT EVAL MOD COMPLEX 30 MIN: CPT

## 2024-11-21 PROCEDURE — 27000207 HC ISOLATION

## 2024-11-21 PROCEDURE — 82107 ALPHA-FETOPROTEIN L3: CPT | Performed by: STUDENT IN AN ORGANIZED HEALTH CARE EDUCATION/TRAINING PROGRAM

## 2024-11-21 PROCEDURE — 25000003 PHARM REV CODE 250: Performed by: PHYSICIAN ASSISTANT

## 2024-11-21 PROCEDURE — 63600175 PHARM REV CODE 636 W HCPCS: Performed by: STUDENT IN AN ORGANIZED HEALTH CARE EDUCATION/TRAINING PROGRAM

## 2024-11-21 RX ORDER — ALBUMIN HUMAN 250 G/1000ML
50 SOLUTION INTRAVENOUS 2 TIMES DAILY
Status: DISCONTINUED | OUTPATIENT
Start: 2024-11-21 | End: 2024-11-22

## 2024-11-21 RX ORDER — HYDROCODONE BITARTRATE AND ACETAMINOPHEN 500; 5 MG/1; MG/1
TABLET ORAL
Status: DISCONTINUED | OUTPATIENT
Start: 2024-11-21 | End: 2024-11-22

## 2024-11-21 RX ORDER — MUPIROCIN 20 MG/G
OINTMENT TOPICAL 2 TIMES DAILY
Status: COMPLETED | OUTPATIENT
Start: 2024-11-21 | End: 2024-11-26

## 2024-11-21 RX ORDER — CHLORHEXIDINE GLUCONATE ORAL RINSE 1.2 MG/ML
15 SOLUTION DENTAL 2 TIMES DAILY
Status: DISCONTINUED | OUTPATIENT
Start: 2024-11-21 | End: 2024-11-24

## 2024-11-21 RX ADMIN — HEPARIN SODIUM 5000 UNITS: 5000 INJECTION INTRAVENOUS; SUBCUTANEOUS at 10:11

## 2024-11-21 RX ADMIN — TAMSULOSIN HYDROCHLORIDE 0.4 MG: 0.4 CAPSULE ORAL at 10:11

## 2024-11-21 RX ADMIN — ALBUMIN (HUMAN) 50 G: 12.5 SOLUTION INTRAVENOUS at 10:11

## 2024-11-21 RX ADMIN — Medication 250 MCG: at 10:11

## 2024-11-21 RX ADMIN — LACTULOSE 20 G: 20 SOLUTION ORAL at 10:11

## 2024-11-21 RX ADMIN — MIDODRINE HYDROCHLORIDE 10 MG: 5 TABLET ORAL at 10:11

## 2024-11-21 RX ADMIN — OXYCODONE HYDROCHLORIDE AND ACETAMINOPHEN 500 MG: 500 TABLET ORAL at 10:11

## 2024-11-21 RX ADMIN — MUPIROCIN: 20 OINTMENT TOPICAL at 10:11

## 2024-11-21 RX ADMIN — ALBUMIN (HUMAN) 50 G: 12.5 SOLUTION INTRAVENOUS at 09:11

## 2024-11-21 RX ADMIN — PANTOPRAZOLE SODIUM 40 MG: 40 TABLET, DELAYED RELEASE ORAL at 10:11

## 2024-11-21 RX ADMIN — SODIUM ZIRCONIUM CYCLOSILICATE 10 G: 5 POWDER, FOR SUSPENSION ORAL at 12:11

## 2024-11-21 RX ADMIN — LACTULOSE 20 G: 20 SOLUTION ORAL at 02:11

## 2024-11-21 RX ADMIN — HEPARIN SODIUM 5000 UNITS: 5000 INJECTION INTRAVENOUS; SUBCUTANEOUS at 02:11

## 2024-11-21 RX ADMIN — CHLORHEXIDINE GLUCONATE 0.12% ORAL RINSE 15 ML: 1.2 LIQUID ORAL at 10:11

## 2024-11-21 RX ADMIN — HEPARIN SODIUM 5000 UNITS: 5000 INJECTION INTRAVENOUS; SUBCUTANEOUS at 05:11

## 2024-11-21 RX ADMIN — VANCOMYCIN HYDROCHLORIDE 1000 MG: 1 INJECTION, POWDER, LYOPHILIZED, FOR SOLUTION INTRAVENOUS at 12:11

## 2024-11-21 RX ADMIN — CEFTRIAXONE 2 G: 2 INJECTION, POWDER, FOR SOLUTION INTRAMUSCULAR; INTRAVENOUS at 10:11

## 2024-11-21 RX ADMIN — MIDODRINE HYDROCHLORIDE 10 MG: 5 TABLET ORAL at 02:11

## 2024-11-21 NOTE — ASSESSMENT & PLAN NOTE
This patient does have evidence of infective focus  My overall impression is sepsis.  Source: Skin and Soft Tissue (location RUE)  Antibiotics given-   Antibiotics (72h ago, onward)      Start     Stop Route Frequency Ordered    11/21/24 1030  vancomycin (VANCOCIN) 1,000 mg in D5W 250 mL IVPB (admixture device)         -- IV Once 11/21/24 0921    11/20/24 2100  cefTRIAXone injection 2 g         -- IV Every 24 hours (non-standard times) 11/20/24 1848          Latest lactate reviewed-  Recent Labs   Lab 11/20/24  1935 11/20/24  2210   LACTATE  --  3.5*   POCLAC 3.42*  --      Organ dysfunction indicated by Acute kidney injury    Fluid challenge Contraindicated- Fluid bolus is contraindicated in this patient due to End Stage Liver Disease and Volume overload due to- ESLD      Post- resuscitation assessment No - Post resuscitation assessment not needed       Will Not start Pressors- Levophed for MAP of 65  Source control achieved by: IV abx  Cont vanc/ceftriaxone   RUE erythema/tenderness - cellulitis   No pocket for safe paracentesis  Infectious workup in process

## 2024-11-21 NOTE — CLINICAL REVIEW
"RAPID RESPONSE NURSE CHART REVIEW        Chart Reviewed: 11/21/2024, 8:58 AM    MRN: [id number]  Bed: 8072/8072 A    Dx: Decompensated cirrhosis    Juan Carlos Yoo Sr. has a past medical history of Atrial fibrillation, Bulging disc, Cirrhosis, Colon polyp, EV (esophageal varices), Hypertension, Skin cancer, and Thrombocytopenia.    Last VS: BP (!) 99/57   Pulse 92   Temp 97.9 °F (36.6 °C) (Oral)   Resp 18   Ht 6' 1" (1.854 m)   Wt (!) 153 kg (337 lb 4.9 oz)   SpO2 95%   BMI 44.50 kg/m²     24H Vital Sign Range:  Temp:  [97.4 °F (36.3 °C)-97.9 °F (36.6 °C)]   Pulse:  []   Resp:  [18-30]   BP: ()/(52-90)   SpO2:  [93 %-98 %]     Level of Consciousness (AVPU): alert    Recent Labs     11/20/24  1349 11/20/24  1455 11/21/24  0324   WBC 12.67  --  14.06*   HGB 6.7*  --  7.0*   HCT 21.1* 23* 21.5*   PLT 81*  --  66*       Recent Labs     11/20/24  1349 11/20/24  2210 11/21/24  0324   * 131* 130*   K 6.1* 5.1 5.6*   CL 96 96 96   CO2 20* 18* 18*   BUN 49* 48* 48*   CREATININE 5.7* 5.9* 5.9*   GLU 98 102 98   PHOS  --   --  5.9*   MG  --   --  2.4        Recent Labs     11/20/24  1542   PH 7.412   PCO2 34.7*   PO2 40   HCO3 22.1*   POCSATURATED 76   BE -2        MEWS score: 2    Rounding completed with charge JAZMYNE Sesay reports continuing current plan of care, maintaining MAP >65 with scheduled midodrine and albumin. No additional concerns verbalized at this time. Instructed to call [phone] for further concerns or assistance.    Es Almanza RN        "

## 2024-11-21 NOTE — ASSESSMENT & PLAN NOTE
Baseline creatinine is  1.5 . Most recent creatinine and eGFR are listed below.    Recent Labs     11/20/24  1349   CREATININE 5.7*   EGFRNORACEVR 10.0*      Plan  - JAE is worsening. Will continue current treatment  - Avoid nephrotoxins and renally dose meds for GFR listed above  - Monitor urine output, serial BMP, and adjust therapy as needed  - Etiology includes volume overload, obstruction, hypotension, possible HRS  - Likely unable to get accurate PVR due to ascites  - Place peacock catheter and monitor strict I&O  - IV diuresis  - Start midodrine and albumin  - Check UA, urine Na  - Renal ultrasound  - Consult nephrology

## 2024-11-21 NOTE — CARE UPDATE
I have reviewed the chart of Juan Carlos Yoo Sr. and collaborated with Glory Oliva MD in the care of the patient who is hospitalized for the following:    Active Hospital Problems    Diagnosis    *Decompensated cirrhosis    Thrombocytopenia    Hyponatremia    Encephalopathy, metabolic    Severe sepsis    Stage 3a chronic kidney disease    Microcytic anemia    JAE (acute kidney injury)    Atrial fibrillation    Coagulopathy          I have reviewed Juan Carlos Yoo Sr. with the multidisciplinary team during discharge huddle.       Elisa Mar PA-C  Unit Based OMI

## 2024-11-21 NOTE — PLAN OF CARE
After assessment of ABD via US, per Provider, there is no safe pocket of fluid to drain. Paracentesis not performed. Report given to primary inpt nurse for bed 8072. All questions and concerns addressed. Pt to be transferred back to [facility] via transport. Pt stable for transport.

## 2024-11-21 NOTE — CARE UPDATE
Unit OMI Care Support Interaction      I have reviewed the chart of Juan Carlos Yoo  who is hospitalized for Decompensated cirrhosis. The patient is currently located in the following unit: ED     I have seen and examined the patient and provided the following support:     Proactive rounds - patient is stable  BP soft, though maintaining MAP >65   Notified primary team - orders for increased midodrine to 10 mg   1 unit pRBC pending Type & Screen results     Afebrile, Afib rate controlled, O2 96% room air  Continue to monitor        Nica Atkins PA-C  Unit Based OMI

## 2024-11-21 NOTE — PT/OT/SLP EVAL
Occupational Therapy   Evaluation    Name: Juan Carlos Yoo Sr.  MRN: [id number]  Admitting Diagnosis: Decompensated cirrhosis  Recent Surgery: * No surgery found *      Recommendations:     Discharge Recommendations: Moderate Intensity Therapy  Discharge Equipment Recommendations:  none  Barriers to discharge:  Decreased caregiver support    Assessment:     Juan Carlos Yoo Sr. is a 69 y.o. male with a medical diagnosis of Decompensated cirrhosis.  He presents with significant swelling and weeping from BUE. Pt. With limitations in self-care and decreased ROM due to edema and pain with increased pain noted in RUE. Pt. Reporting too fatigued from going for testing /procedures on this date and having recently taken something to assist with sleep. Pt. Appears to be below PLOF.  Patient would benefit from continued OT services to maximize level of safety and independence with self-care tasks.   . Performance deficits affecting function: weakness, impaired endurance, impaired self care skills, impaired functional mobility, pain, decreased lower extremity function, decreased upper extremity function, decreased ROM, impaired fine motor, impaired skin, edema.      Rehab Prognosis: Good; patient would benefit from acute skilled OT services to address these deficits and reach maximum level of function.       Plan:     Patient to be seen 4 x/week to address the above listed problems via self-care/home management, therapeutic activities, therapeutic exercises, neuromuscular re-education  Plan of Care Expires: 11/30/24  Plan of Care Reviewed with: patient    Subjective     Chief Complaint: Pt. Reporting pain and excessive draining especially from RUE  Patient/Family Comments/goals: To clean teeth and eat    Occupational Profile:  Living Environment: Pt. Resides alone in [place] in [facility] with threshold step  Previous level of function: Pt. Reports [place] with ADL tasks and mobility. Pt. Did report when he is admitted to the hospital  it typically takes a few days to get better.   Roles and Routines: caretaker of self, grandfather, father  Equipment Used at Home: none  Assistance upon Discharge: reported son also lives in [place] and checks on him    Pain/Comfort:  Pain Rating 1:  (did not rate)  Location - Side 1: Right  Location 1: arm  Pain Addressed 1: Reposition, Distraction  Pain Rating Post-Intervention 1: 0/10 (at rest)    Patients cultural, spiritual, Mandaeism conflicts given the current situation: no    Objective:     Communicated with: nurse prior to session.  Patient found supine with peacock catheter, telemetry upon OT entry to room.    General Precautions: Standard, fall, special contact  Orthopedic Precautions: N/A  Braces: N/A  Respiratory Status: Room air    Occupational Performance:    Bed Mobility:    Pt. Was bale to scoot self up in bed using BLE and BUE when bed inverted    Functional Mobility/Transfers:  Pt. Declined this pm due to fatigue  Functional Mobility: Not tested    Activities of Daily Living:  Feeding:  pt. Required assist for cutting up food and  opening al containers  Grooming: supervision  and increased time to brush teeth and wash face limitations due to edema in RUE and arm feeling heavy from the swelling  (performed in supine with HOB elevated as declined sit EOB)    Cognitive/Visual Perceptual:  Cognitive/Psychosocial Skills:     -       Oriented to: Person, Place, Time, and Situation   -       Follows Commands/attention:Follows multistep  commands  -       Communication: clear/fluent  -       Memory: No Deficits noted  -       Safety awareness/insight to disability: intact   -       Mood/Affect/Coping skills/emotional control: Appropriate to situation  Visual/Perceptual:      -Intact      Physical Exam:  Balance: -       not tested  Skin integrity: Tear of BUE and weeping also noted with RUE greater than left; some redness noted  Edema:  Severe in RUE and moderate in LUE  Sensation:    -        Intact  Dominant hand: -       right  Upper Extremity Range of Motion:     -       Right Upper Extremity: WFL limited by pain for elbow flexion  -       Left Upper Extremity: WFL   Strength:    -       Right Upper Extremity: WFL  -       Left Upper Extremity: WFL  Fine Motor Coordination: impaired due to edema    AMPAC 6 Click ADL:  AMPAC Total Score: 17    Treatment & Education:  Pt. Educated on importance of elevating BUE to assist with edema management  Pt. Educated on role of OT and pOC  Pt. Educated on need to reposition frequently when in bed to prevent skin break down (Pt. Verbalized that he is able to roll side to side)    Patient left supine with all lines intact, call button in reach, and eating lunch (declined sitting up in chair to eat)    GOALS:   Multidisciplinary Problems       Occupational Therapy Goals          Problem: Occupational Therapy    Goal Priority Disciplines Outcome Interventions   Occupational Therapy Goal     OT, PT/OT Progressing    Description: Goals to be met by: 11-30-24     Patient will increase functional independence with ADLs by performing:    UE Dressing with Set-up Assistance.  LE Dressing with Stand-by Assistance.  Grooming while standing at sink with Stand-by Assistance.  Toileting from toilet with Stand-by Assistance for hygiene and clothing management.   Step transfer: with SBA and use of LRAD  Independent with HEP to BUE to improve ROM                         History:     Past Medical History:   Diagnosis Date    Atrial fibrillation     Bulging disc     Cirrhosis     Colon polyp 12/29/2017    Rpt 5 yrs    EV (esophageal varices)     Hypertension 3/30/2023    Skin cancer 03/2017    Thrombocytopenia          Past Surgical History:   Procedure Laterality Date    CATHETERIZATION OF BOTH LEFT AND RIGHT HEART N/A 2/8/2023    Procedure: CATHETERIZATION, HEART, BOTH LEFT AND RIGHT;  Surgeon: Flo Malone MD;  Location: [facility] CATH LAB;  Service: Cardiology;  Laterality:  N/A;  low bleeding risk 1.0%    CHOLECYSTECTOMY      COLONOSCOPY      COLONOSCOPY N/A 12/29/2017    ta rpt 2022    CORONARY ANGIOGRAPHY N/A 2/8/2023    Procedure: ANGIOGRAM, CORONARY ARTERY;  Surgeon: Flo Malone MD;  Location: [facility] CATH LAB;  Service: Cardiology;  Laterality: N/A;    HERNIA REPAIR      SKIN BIOPSY  03/2017    TONSILLECTOMY      UPPER GASTROINTESTINAL ENDOSCOPY  2011    EV    UPPER GASTROINTESTINAL ENDOSCOPY  2016    VASECTOMY         Time Tracking:     OT Date of Treatment: 11/21/24  OT Start Time: 1424  OT Stop Time: 1449  OT Total Time (min): 25 min    Billable Minutes:Evaluation 25    11/21/2024

## 2024-11-21 NOTE — SUBJECTIVE & OBJECTIVE
Past Medical History:   Diagnosis Date    Atrial fibrillation     Bulging disc     Cirrhosis     Colon polyp 12/29/2017    Rpt 5 yrs    EV (esophageal varices)     Hypertension 3/30/2023    Skin cancer 03/2017    Thrombocytopenia        Past Surgical History:   Procedure Laterality Date    CATHETERIZATION OF BOTH LEFT AND RIGHT HEART N/A 2/8/2023    Procedure: CATHETERIZATION, HEART, BOTH LEFT AND RIGHT;  Surgeon: Flo Malone MD;  Location: [facility] CATH LAB;  Service: Cardiology;  Laterality: N/A;  low bleeding risk 1.0%    CHOLECYSTECTOMY      COLONOSCOPY      COLONOSCOPY N/A 12/29/2017    ta rpt 2022    CORONARY ANGIOGRAPHY N/A 2/8/2023    Procedure: ANGIOGRAM, CORONARY ARTERY;  Surgeon: Flo Malone MD;  Location: [facility] CATH LAB;  Service: Cardiology;  Laterality: N/A;    HERNIA REPAIR      SKIN BIOPSY  03/2017    TONSILLECTOMY      UPPER GASTROINTESTINAL ENDOSCOPY  2011    EV    UPPER GASTROINTESTINAL ENDOSCOPY  2016    VASECTOMY         Review of patient's allergies indicates:  No Known Allergies    Current Facility-Administered Medications on File Prior to Encounter   Medication    sodium chloride 0.9% flush 10 mL     Current Outpatient Medications on File Prior to Encounter   Medication Sig    aMILoride (MIDAMOR) 5 MG Tab Take 2 tablets (10 mg total) by mouth once daily. Start with 5 mg (1 tablet) daily, increase to 10 mg (2 tablets) daily if not losing 3 pounds a week.    ascorbic acid, vitamin C, (VITAMIN C) 500 MG tablet Take 500 mg by mouth once daily.    cyanocobalamin (VITAMIN B-12) 100 MCG tablet Take 100 mcg by mouth once daily.    fish oil-omega-3 fatty acids 300-1,000 mg capsule Take 1 g by mouth 2 (two) times daily.    furosemide (LASIX) 40 MG tablet TAKE ONE TABLET BY MOUTH TWICE DAILY    nadoloL (CORGARD) 20 MG tablet TAKE 2 TABLETS BY MOUTH EVERY EVENING    potassium chloride SA (KLOR-CON M20) 20 MEQ tablet Take 1 tablet (20 mEq total) by mouth 2 (two) times daily.    tamsulosin (FLOMAX)  0.4 mg Cap TAKE 1 CAPSULE (0.4 MG TOTAL) BY MOUTH EVERY EVENING.    amLODIPine (NORVASC) 10 MG tablet Take 1 tablet (10 mg total) by mouth once daily.    pantoprazole (PROTONIX) 40 MG tablet Take 1 tablet (40 mg total) by mouth once daily.     Family History       Problem Relation (Age of Onset)    Cancer Maternal Uncle    Heart disease Maternal Uncle    Hyperlipidemia Brother    Ovarian cancer Sister          Tobacco Use    Smoking status: Never    Smokeless tobacco: Never   Substance and Sexual Activity    Alcohol use: Not Currently     Alcohol/week: 0.0 standard drinks of alcohol    Drug use: No    Sexual activity: Not on file     Review of Systems   Constitutional:  Positive for chills and fatigue. Negative for activity change, appetite change, diaphoresis, fever and unexpected weight change.   Respiratory:  Positive for shortness of breath. Negative for cough, chest tightness, wheezing and stridor.    Cardiovascular:  Positive for leg swelling. Negative for chest pain and palpitations.   Gastrointestinal:  Positive for abdominal distention and abdominal pain. Negative for blood in stool, constipation, diarrhea, nausea and vomiting.   Endocrine: Negative for cold intolerance and heat intolerance.   Genitourinary:  Positive for scrotal swelling. Negative for difficulty urinating, dysuria, flank pain and frequency.   Musculoskeletal:  Positive for gait problem. Negative for arthralgias, joint swelling and myalgias.   Skin:  Positive for rash and wound.   Neurological:  Positive for weakness. Negative for dizziness, light-headedness and headaches.   Psychiatric/Behavioral:  Positive for decreased concentration.      Objective:     Vital Signs (Most Recent):  Temp: 97.6 °F (36.4 °C) (11/20/24 1226)  Pulse: 96 (11/20/24 1815)  Resp: 18 (11/20/24 1815)  BP: (!) 92/53 (11/20/24 1815)  SpO2: 96 % (11/20/24 1815) Vital Signs (24h Range):  Temp:  [97.6 °F (36.4 °C)] 97.6 °F (36.4 °C)  Pulse:  [] 96  Resp:  [18-24]  18  SpO2:  [96 %-98 %] 96 %  BP: ()/(53-90) 92/53     Weight: 131.5 kg (290 lb)  Body mass index is 38.26 kg/m².     Physical Exam  Constitutional:       Appearance: He is obese. He is ill-appearing (chronic).   HENT:      Head: Normocephalic and atraumatic.   Eyes:      Conjunctiva/sclera: Conjunctivae normal.      Pupils: Pupils are equal, round, and reactive to light.   Cardiovascular:      Rate and Rhythm: Normal rate. Rhythm irregular.      Heart sounds: Normal heart sounds.   Pulmonary:      Effort: Pulmonary effort is normal. No respiratory distress.      Breath sounds: Rales present. No wheezing.   Abdominal:      General: Bowel sounds are normal. There is distension.      Palpations: Abdomen is soft.      Tenderness: There is abdominal tenderness.      Hernia: A hernia (umbilical and L inguinal hernia) is present.   Musculoskeletal:         General: Normal range of motion.      Cervical back: Normal range of motion and neck supple.      Right lower leg: Edema present.      Left lower leg: Edema present.   Skin:     General: Skin is warm and dry.      Coloration: Skin is not jaundiced.      Findings: Erythema (RUE) and rash present.   Neurological:      General: No focal deficit present.      Mental Status: He is alert and oriented to person, place, and time. He is confused.      Comments: Asterixis   Psychiatric:         Attention and Perception: He is inattentive.          CRANIAL NERVES     CN III, IV, VI   Pupils are equal, round, and reactive to light.     Significant Labs: All pertinent labs within the past 24 hours have been reviewed.    Significant Imaging: I have reviewed all pertinent imaging results/findings within the past 24 hours.

## 2024-11-21 NOTE — ASSESSMENT & PLAN NOTE
Baseline creatinine is  1.5 . Most recent creatinine and eGFR are listed below.    Recent Labs     11/20/24  1349 11/20/24  2210 11/21/24  0324   CREATININE 5.7* 5.9* 5.9*   EGFRNORACEVR 10.0* 9.6* 9.6*      Plan  - JAE is worsening. Will continue current treatment  - Avoid nephrotoxins and renally dose meds for GFR listed above  - Monitor urine output, serial BMP, and adjust therapy as needed    - Etiology includes volume overload, obstruction, hypotension, possible HRS  - monitor peacock catheter and monitor strict I&O  - Will hold diuresis  - Start midodrine and albumin  - Check UA, urine Na  - Renal ultrasound  - Follow nephrology recommendations

## 2024-11-21 NOTE — ASSESSMENT & PLAN NOTE
Anemia is likely due to chronic disease due to Chronic liver disease. Most recent hemoglobin and hematocrit are listed below.  Recent Labs     11/20/24  1349 11/20/24  1455 11/21/24  0324   HGB 6.7*  --  7.0*   HCT 21.1* 23* 21.5*     Plan  - Monitor serial CBC: Daily  - Transfuse PRBC if patient becomes hemodynamically unstable, symptomatic or H/H drops below 7/21.  - Patient has received 1 units of PRBCs on 11/20  - Patient's anemia is currently stable  - Hb baseline 7-8. No obvious bleeding  - Hold eliquis for now

## 2024-11-21 NOTE — ASSESSMENT & PLAN NOTE
Patient has persistent (7 days or more) atrial fibrillation. Patient is currently in atrial fibrillation. [id number] Score: 1. The patients heart rate in the last 24 hours is as follows:  Pulse  Min: 94  Max: 107     Antiarrhythmics       Anticoagulants  heparin (porcine) injection 5,000 Units, Every 8 hours, Subcutaneous    Plan  - Replete lytes with a goal of K>4, Mg >2  - Patient is anticoagulated, see medications listed above.  - Patient's afib is currently controlled  - Hold eliquis for anemia  - Hold BB for hypotension

## 2024-11-21 NOTE — ASSESSMENT & PLAN NOTE
Volume Overload  Etoh Cirrhosis  Hx of HCC s/p Y90    MELD 3.0: 36 at 11/21/2024  3:24 AM  MELD-Na: 37 at 11/21/2024  3:24 AM  Calculated from:  Serum Creatinine: 5.9 mg/dL (Using max of 3 mg/dL) at 11/21/2024  3:24 AM  Serum Sodium: 130 mmol/L at 11/21/2024  3:24 AM  Total Bilirubin: 7.4 mg/dL at 11/21/2024  3:24 AM  Serum Albumin: 3.2 g/dL at 11/21/2024  3:24 AM  INR(ratio): 2.1 at 11/21/2024  3:24 AM  Age at listing (hypothetical): 69 years  Sex: Male at 11/21/2024  3:24 AM     Progressively worsening anasarca    Received lasix 80mg IVP in ED, hold further diuresis due to worsening JAE    --Nephrology consulted for HRS. continue albumin and midodrine  -- broad infectious workup. Cont vanc/ceftriaxone and do infectious workup  -- IR assessed 11/21 however no pocket for safe paracentesis  -- PETH and serological workup checked.  -- Appreciate hepatology recommendations

## 2024-11-21 NOTE — ASSESSMENT & PLAN NOTE
This patient does have evidence of infective focus  My overall impression is sepsis.  Source: Skin and Soft Tissue (location RUE)  Antibiotics given-   Antibiotics (72h ago, onward)      Start     Stop Route Frequency Ordered    11/20/24 2100  cefTRIAXone injection 2 g         -- IV Every 24 hours (non-standard times) 11/20/24 1848          Latest lactate reviewed-  Recent Labs   Lab 11/20/24  1500 11/20/24  1633   LACTATE  --  2.9*   POCLAC 3.94*  --      Organ dysfunction indicated by Acute kidney injury    Fluid challenge Contraindicated- Fluid bolus is contraindicated in this patient due to End Stage Liver Disease and Volume overload due to- ESLD      Post- resuscitation assessment No - Post resuscitation assessment not needed       Will Not start Pressors- Levophed for MAP of 65  Source control achieved by: IV abx  Cont vanc/ceftriaxone   RUE erythema/tenderness - cellulitis vs contact dermatitis   Infectious workup in process

## 2024-11-21 NOTE — PLAN OF CARE
[facility] - Telemetry Stepdown  Initial Discharge Assessment       Primary Care Provider: Nyla Rios FNP    Admission Diagnosis: Edema [R60.9]  Shortness of breath [R06.02]  Hepatorenal failure [K76.7]  Chest pain [R07.9]    Admission Date: 11/20/2024  Expected Discharge Date: 11/25/2024    Transition of Care Barriers: None    Payor: BLUE CROSS BLUE SHIELD / Plan: [place] PPO / Product Type: PPO /     Extended Emergency Contact Information  Primary Emergency Contact: JACQUIE YOO JR  Mobile Phone: [phone]  Relation: Son  Preferred language: English   needed? No  Secondary Emergency Contact: Vivian Henderson   [place]  Mobile Phone: [phone]  Relation: Significant other    Discharge Plan A: Home  Discharge Plan B: Home, Home with family      [facility] - [place], [place] - [address]  [address] [address]  Phone: [phone] Fax: [phone]    [id number] - [place], [place] - [address]  [address]  [facility] [address]  Phone: [phone] Fax: [phone]      Initial Assessment (most recent)       Adult Discharge Assessment - 11/21/24 1132          Discharge Assessment    Assessment Type Discharge Planning Assessment     Confirmed/corrected address, phone number and insurance Yes     Confirmed Demographics Correct on Facesheet     Source of Information patient     Communicated AUTUMN with patient/caregiver Yes     Reason For Admission Edema     People in Home alone     Do you expect to return to your current living situation? Yes     Do you have help at home or someone to help you manage your care at home? Yes     Who are your caregiver(s) and their phone number(s)? Jacquie Yoo Jr - Son - [phone]     Prior to hospitilization cognitive status: Alert/Oriented     Current cognitive status: Alert/Oriented     Walking or Climbing Stairs Difficulty no     Dressing/Bathing Difficulty no     Equipment Currently Used at  Home none     Readmission within 30 days? No     Patient currently being followed by outpatient case management? No     Do you currently have service(s) that help you manage your care at home? No     Do you take prescription medications? Yes     Do you have prescription coverage? Yes     Do you have any problems affording any of your prescribed medications? No     Is the patient taking medications as prescribed? no     Who is going to help you get home at discharge? Juan Carlos Yoo Jr - Son - [phone]     How do you get to doctors appointments? family or friend will provide     Are you on dialysis? No     Do you take coumadin? No     Discharge Plan A Home     Discharge Plan B Home;Home with family     DME Needed Upon Discharge  other (see comments)   TBD    Discharge Plan discussed with: Patient     Transition of Care Barriers None        Physical Activity    On average, how many days per week do you engage in moderate to strenuous exercise (like a brisk walk)? 0 days     On average, how many minutes do you engage in exercise at this level? 0 min        Financial Resource Strain    How hard is it for you to pay for the very basics like food, housing, medical care, and heating? Not very hard        Housing Stability    In the last 12 months, was there a time when you were not able to pay the mortgage or rent on time? No     At any time in the past 12 months, were you homeless or living in a shelter (including now)? No        Transportation Needs    Has the lack of transportation kept you from medical appointments, meetings, work or from getting things needed for daily living? No        Food Insecurity    Within the past 12 months, you worried that your food would run out before you got the money to buy more. Never true     Within the past 12 months, the food you bought just didn't last and you didn't have money to get more. Never true        Stress    Do you feel stress - tense, restless, nervous, or anxious, or  unable to sleep at night because your mind is troubled all the time - these days? To some extent        Social Isolation    How often do you feel lonely or isolated from those around you?  Never        Alcohol Use    Q1: How often do you have a drink containing alcohol? Patient declined     Q2: How many drinks containing alcohol do you have on a typical day when you are drinking? Patient declined     Q3: How often do you have six or more drinks on one occasion? Patient declined        Utilities    In the past 12 months has the electric, gas, oil, or water company threatened to shut off services in your home? No        Health Literacy    How often do you need to have someone help you when you read instructions, pamphlets, or other written material from your doctor or pharmacy? Never                   Completed initial assessment Pt was independent with his ADL's. Juan Carlos Yoo Jr - [place] - [phone] will be his ride home upon D/C. Cm will continue to follow and offer support as needed.  Discharge Plan A and Plan B have been determined by review of patient's clinical status, future medical and therapeutic needs, and coverage/benefits for post-acute care in coordination with multidisciplinary team members.  [place] Noel, [facility]    [facility]  [phone]

## 2024-11-21 NOTE — PROGRESS NOTES
[facility] - Telemetry [facility] Medicine  Progress Note    Patient Name: Juan Carlos Yoo Sr.  MRN: [id number]  Patient Class: IP- Inpatient   Admission Date: 11/20/2024  Length of Stay: 1 days  Attending Physician: Glory Oliva MD  Primary Care Provider: Nyla Rios FNP        Subjective:     Principal Problem:Decompensated cirrhosis        HPI:  69M with PMH of alcoholic cirrhosis, esophageal varices, Afib on eliquis, and HTN who presents for c/o worsening diffuse swelling as well as R arm pain/erythema. He was recently hospitalized 1 month ago at [facility] for volume overload in the setting of decompensated cirrhosis. He was treated with IV diuresis and discharged with adjustment to his diuretics, currently on lasix 60mg BID and metolazone 2.5mg every other week as per family. Patient reports diffuse swelling of his upper and lower extremities in addition to distended abdomen and worsening dyspnea, which he believes is due to recent medication adjustment. Family states he is non-compliant with low sodium diet and fluid restriction. Family states he has been compliant with diuretics, but rom't taken eliquis for 3 days due to issue getting refill. He also reports scratching right arm on door frame 3-4 days ago which has become red and painful after wrapping in in bandage. He claims to be compliant with medications, but is a poor historian. He follows with hepatology, not currently active on transplant list. He states he hasn't consumed alcohol for at least 9 months. Has c/o chills, but denies fever, cough, nausea, vomiting, chest pain, dysuria, hematuria, blood in stool. Workup in ED remarkable for VS: T 97.6 HR 94 RR 22 BP 95/56 O2 sat 97% on RA. Hb 6.7, MCV 75, Plt 81, PT/INR 22.6/2.2, Na 128, K 6.1, BUN/Cr 49/5.7, Alb 2.8, AST/ALT 35/9, Ammonia 104, , Trop neg, LA 2.9, CXR: Cardiac size is enlarged similar to prior.  No large volume of pleural fluid noted although there is mild blunting of the  right costophrenic angle which may relate to a small amount of pleural fluid.  Suspected right basilar opacity, to be correlated clinically for infection. RUE venous doppler: No thrombus in central veins of the right upper extremity. Patient received vanc/zosyn and lasix 80mg IVP in ED and will be admitted to hospital medicine service for further management.    Overview/Hospital Course:  69 y.o. male with history of EtOH use disorder, EtOH cirrhosis, HCC s/p y90, morbid obesity BMI 44, HTN, and Afib who presents with severe anasarca and JAE.     Appreciate hepatology and nephrology recommendations. Hold diuretics. Concern for HRS, continue albumin and midodrine. Continue broad infectious workup.     Interval History:     NAEON. Renal function continues to worsen. Will give albumin and follow nephrology recommendations.     Appreciate hepatology recommendations.    Review of Systems   Constitutional:  Positive for fatigue. Negative for chills and fever.   HENT:  Negative for congestion.    Respiratory:  Negative for cough, shortness of breath and wheezing.    Cardiovascular:  Positive for leg swelling.   Gastrointestinal:  Positive for abdominal distention. Negative for abdominal pain, diarrhea and nausea.   Genitourinary:  Positive for scrotal swelling. Negative for dysuria and frequency.   Musculoskeletal:  Negative for arthralgias, joint swelling and myalgias.   Skin:  Positive for rash and wound.   Neurological:  Negative for dizziness and light-headedness.   Psychiatric/Behavioral:  Positive for confusion.      Objective:     Vital Signs (Most Recent):  Temp: 97.5 °F (36.4 °C) (11/21/24 1128)  Pulse: 110 (11/21/24 1130)  Resp: 18 (11/21/24 1128)  BP: (!) 100/54 (11/21/24 1128)  SpO2: (!) 93 % (11/21/24 1128) Vital Signs (24h Range):  Temp:  [97.4 °F (36.3 °C)-97.9 °F (36.6 °C)] 97.5 °F (36.4 °C)  Pulse:  [] 110  Resp:  [18-30] 18  SpO2:  [93 %-98 %] 93 %  BP: ()/(52-65) 100/54     Weight: (!) 153 kg  (337 lb 4.9 oz)  Body mass index is 44.5 kg/m².     Physical Exam  Constitutional:       General: He is not in acute distress.     Appearance: He is obese. He is ill-appearing (chronic). He is not toxic-appearing.   HENT:      Head: Normocephalic and atraumatic.   Eyes:      General: Scleral icterus present.      Extraocular Movements: Extraocular movements intact.      Conjunctiva/sclera: Conjunctivae normal.   Cardiovascular:      Rate and Rhythm: Normal rate. Rhythm irregular.      Heart sounds: Normal heart sounds.   Pulmonary:      Effort: Pulmonary effort is normal. No respiratory distress.      Breath sounds: No wheezing.   Abdominal:      General: Bowel sounds are normal. There is distension.      Palpations: Abdomen is soft.      Tenderness: There is no abdominal tenderness.      Hernia: A hernia (umbilical and L inguinal hernia) is present.   Musculoskeletal:         General: Normal range of motion.      Right upper arm: Swelling and edema present.      Left upper arm: Swelling and edema present.      Cervical back: Normal range of motion and neck supple.      Right lower leg: Edema present.      Left lower leg: Edema present.   Skin:     General: Skin is warm and dry.      Coloration: Skin is not jaundiced.      Findings: Erythema (RUE) present.   Neurological:      General: No focal deficit present.      Mental Status: He is alert and oriented to person, place, and time. He is confused.      Comments: Asterixis   Psychiatric:         Attention and Perception: He is inattentive.         Behavior: Behavior normal.              Significant Labs: All pertinent labs within the past 24 hours have been reviewed.    Recent Labs   Lab 11/21/24  0324   WBC 14.06*   RBC 2.88*   HGB 7.0*   HCT 21.5*   PLT 66*   MCV 75*   MCH 24.3*   MCHC 32.6     Recent Labs   Lab 11/21/24  0324   CALCIUM 9.8   ALBUMIN 3.2*   PROT 7.5   *   K 5.6*   CO2 18*   CL 96   BUN 48*   CREATININE 5.9*   ALKPHOS 45   ALT 9*   AST 27    BILITOT 7.4*   \  Significant Imaging: I have reviewed all pertinent imaging results/findings within the past 24 hours.    Assessment/Plan:      * Decompensated cirrhosis  Volume Overload  Etoh Cirrhosis  Hx of HCC s/p Y90    MELD 3.0: 36 at 11/21/2024  3:24 AM  MELD-Na: 37 at 11/21/2024  3:24 AM  Calculated from:  Serum Creatinine: 5.9 mg/dL (Using max of 3 mg/dL) at 11/21/2024  3:24 AM  Serum Sodium: 130 mmol/L at 11/21/2024  3:24 AM  Total Bilirubin: 7.4 mg/dL at 11/21/2024  3:24 AM  Serum Albumin: 3.2 g/dL at 11/21/2024  3:24 AM  INR(ratio): 2.1 at 11/21/2024  3:24 AM  Age at listing (hypothetical): 69 years  Sex: Male at 11/21/2024  3:24 AM     Progressively worsening anasarca    Received lasix 80mg IVP in ED, hold further diuresis due to worsening JAE    --Nephrology consulted for HRS. continue albumin and midodrine  -- broad infectious workup. Cont vanc/ceftriaxone and do infectious workup  -- IR assessed 11/21 however no pocket for safe paracentesis  -- PETH and serological workup checked.  -- Appreciate hepatology recommendations      Hyperkalemia  Hyperkalemia is likely due to JAE.The patients most recent potassium results are listed below.  Recent Labs     11/20/24  1349 11/20/24  2210 11/21/24  0324   K 6.1* 5.1 5.6*     Plan  - Monitor for arrhythmias with EKG and/or continuous telemetry.   - Treat the hyperkalemia with Potassium Binders.   - Monitor potassium: Every 12 hours  - The patient's hyperkalemia is stable            Encephalopathy, metabolic  2/2 decompensated cirrhosis      Hyponatremia  Hyponatremia is likely due to Cirrhosis. The patient's most recent sodium results are listed below.  Recent Labs     11/20/24  1349 11/20/24  2210 11/21/24  0324   * 131* 130*     Plan  - Correct the sodium by 4-6mEq in 24 hours.   - Appreciate nephrology recommendations  - Monitor sodium Every 12 hours.   - Patient hyponatremia is stable      Thrombocytopenia  The likely etiology of thrombocytopenia  is liver disease. The patients 3 most recent labs are listed below.  Recent Labs     11/20/24  1349 11/21/24  0324   PLT 81* 66*     Plan  - Will transfuse if platelet count is <50k (if undergoing surgical procedure or have active bleeding).      Microcytic anemia  Anemia is likely due to chronic disease due to Chronic liver disease. Most recent hemoglobin and hematocrit are listed below.  Recent Labs     11/20/24  1349 11/20/24  1455 11/21/24  0324   HGB 6.7*  --  7.0*   HCT 21.1* 23* 21.5*     Plan  - Monitor serial CBC: Daily  - Transfuse PRBC if patient becomes hemodynamically unstable, symptomatic or H/H drops below 7/21.  - Patient has received 1 units of PRBCs on 11/20  - Patient's anemia is currently stable  - Hb baseline 7-8. No obvious bleeding  - Hold eliquis for now      Stage 3a chronic kidney disease  Creatine stable for now. BMP reviewed- noted Estimated Creatinine Clearance: 16.9 mL/min (A) (based on SCr of 5.7 mg/dL (H)). according to latest data. Based on current GFR, CKD stage is stage 3 - GFR 30-59.  Monitor UOP and serial BMP and adjust therapy as needed. Renally dose meds. Avoid nephrotoxic medications and procedures.    See above    Severe sepsis  This patient does have evidence of infective focus  My overall impression is sepsis.  Source: Skin and Soft Tissue (location RUE)  Antibiotics given-   Antibiotics (72h ago, onward)      Start     Stop Route Frequency Ordered    11/21/24 1030  vancomycin (VANCOCIN) 1,000 mg in D5W 250 mL IVPB (admixture device)         -- IV Once 11/21/24 0921    11/20/24 2100  cefTRIAXone injection 2 g         -- IV Every 24 hours (non-standard times) 11/20/24 1848          Latest lactate reviewed-  Recent Labs   Lab 11/20/24  1935 11/20/24  2210   LACTATE  --  3.5*   POCLAC 3.42*  --      Organ dysfunction indicated by Acute kidney injury    Fluid challenge Contraindicated- Fluid bolus is contraindicated in this patient due to End Stage Liver Disease and Volume  overload due to- ESLD      Post- resuscitation assessment No - Post resuscitation assessment not needed       Will Not start Pressors- Levophed for MAP of 65  Source control achieved by: IV abx  Cont vanc/ceftriaxone   RUE erythema/tenderness - cellulitis   No pocket for safe paracentesis  Infectious workup in process      JAE (acute kidney injury)  Baseline creatinine is  1.5 . Most recent creatinine and eGFR are listed below.    Recent Labs     11/20/24  1349 11/20/24  2210 11/21/24  0324   CREATININE 5.7* 5.9* 5.9*   EGFRNORACEVR 10.0* 9.6* 9.6*      Plan  - JAE is worsening. Will continue current treatment  - Avoid nephrotoxins and renally dose meds for GFR listed above  - Monitor urine output, serial BMP, and adjust therapy as needed    - Etiology includes volume overload, obstruction, hypotension, possible HRS  - monitor peacock catheter and monitor strict I&O  - Will hold diuresis  - Start midodrine and albumin  - Check UA, urine Na  - Renal ultrasound  - Follow nephrology recommendations      Atrial fibrillation  Patient has persistent (7 days or more) atrial fibrillation. Patient is currently in atrial fibrillation. [id number] Score: 1. The patients heart rate in the last 24 hours is as follows:  Pulse  Min: 94  Max: 107     Antiarrhythmics       Anticoagulants  heparin (porcine) injection 5,000 Units, Every 8 hours, Subcutaneous    Plan  - Replete lytes with a goal of K>4, Mg >2  - Patient is anticoagulated, see medications listed above.  - Patient's afib is currently controlled  - Hold eliquis for anemia  - Hold BB for hypotension    Coagulopathy  2/2 cirrhosis. See plan below    Recent Labs   Lab 11/21/24  0324   INR 2.1*             VTE Risk Mitigation (From admission, onward)           Ordered     heparin (porcine) injection 5,000 Units  Every 8 hours         11/20/24 1622     IP VTE HIGH RISK PATIENT  Once         11/20/24 1622     Place sequential compression device  Until discontinued         11/20/24  1622                    Discharge Planning   AUTUMN: 11/25/2024     Code Status: Full Code   Is the patient medically ready for discharge?:     Reason for patient still in hospital (select all that apply): Patient trending condition, Laboratory test, Treatment, and Consult recommendations  Discharge Plan A: Home              Critical secondary to Patient has a condition that poses threat to life and bodily function: Decompensated cirrhosis      Critical care was time spent personally by me on the following activities: development of treatment plan with patient or surrogate and bedside caregivers, discussions with consultants, evaluation of patient's response to treatment, examination of patient, ordering and performing treatments and interventions, ordering and review of laboratory studies, ordering and review of radiographic studies, pulse oximetry, re-evaluation of patient's condition. This critical care time did not overlap with that of any other provider or involve time for any procedures.    30 mins of Critical Care time was provided in order to assess and manage the high probability of imminent or life-threatening deterioration of cardio-respiratory status requiring vasodilator support and pending intubation      Glory Oliva MD  Department of Hospital Medicine   [facility] - Telemetry Stepdown

## 2024-11-21 NOTE — SUBJECTIVE & OBJECTIVE
Interval History:     NAEON. Renal function continues to worsen. Will give albumin and follow nephrology recommendations.     Appreciate hepatology recommendations.    Review of Systems   Constitutional:  Positive for fatigue. Negative for chills and fever.   HENT:  Negative for congestion.    Respiratory:  Negative for cough, shortness of breath and wheezing.    Cardiovascular:  Positive for leg swelling.   Gastrointestinal:  Positive for abdominal distention. Negative for abdominal pain, diarrhea and nausea.   Genitourinary:  Positive for scrotal swelling. Negative for dysuria and frequency.   Musculoskeletal:  Negative for arthralgias, joint swelling and myalgias.   Skin:  Positive for rash and wound.   Neurological:  Negative for dizziness and light-headedness.   Psychiatric/Behavioral:  Positive for confusion.      Objective:     Vital Signs (Most Recent):  Temp: 97.5 °F (36.4 °C) (11/21/24 1128)  Pulse: 110 (11/21/24 1130)  Resp: 18 (11/21/24 1128)  BP: (!) 100/54 (11/21/24 1128)  SpO2: (!) 93 % (11/21/24 1128) Vital Signs (24h Range):  Temp:  [97.4 °F (36.3 °C)-97.9 °F (36.6 °C)] 97.5 °F (36.4 °C)  Pulse:  [] 110  Resp:  [18-30] 18  SpO2:  [93 %-98 %] 93 %  BP: ()/(52-65) 100/54     Weight: (!) 153 kg (337 lb 4.9 oz)  Body mass index is 44.5 kg/m².     Physical Exam  Constitutional:       General: He is not in acute distress.     Appearance: He is obese. He is ill-appearing (chronic). He is not toxic-appearing.   HENT:      Head: Normocephalic and atraumatic.   Eyes:      General: Scleral icterus present.      Extraocular Movements: Extraocular movements intact.      Conjunctiva/sclera: Conjunctivae normal.   Cardiovascular:      Rate and Rhythm: Normal rate. Rhythm irregular.      Heart sounds: Normal heart sounds.   Pulmonary:      Effort: Pulmonary effort is normal. No respiratory distress.      Breath sounds: No wheezing.   Abdominal:      General: Bowel sounds are normal. There is distension.       Palpations: Abdomen is soft.      Tenderness: There is no abdominal tenderness.      Hernia: A hernia (umbilical and L inguinal hernia) is present.   Musculoskeletal:         General: Normal range of motion.      Right upper arm: Swelling and edema present.      Left upper arm: Swelling and edema present.      Cervical back: Normal range of motion and neck supple.      Right lower leg: Edema present.      Left lower leg: Edema present.   Skin:     General: Skin is warm and dry.      Coloration: Skin is not jaundiced.      Findings: Erythema (RUE) present.   Neurological:      General: No focal deficit present.      Mental Status: He is alert and oriented to person, place, and time. He is confused.      Comments: Asterixis   Psychiatric:         Attention and Perception: He is inattentive.         Behavior: Behavior normal.              Significant Labs: All pertinent labs within the past 24 hours have been reviewed.    Recent Labs   Lab 11/21/24  0324   WBC 14.06*   RBC 2.88*   HGB 7.0*   HCT 21.5*   PLT 66*   MCV 75*   MCH 24.3*   MCHC 32.6     Recent Labs   Lab 11/21/24  0324   CALCIUM 9.8   ALBUMIN 3.2*   PROT 7.5   *   K 5.6*   CO2 18*   CL 96   BUN 48*   CREATININE 5.9*   ALKPHOS 45   ALT 9*   AST 27   BILITOT 7.4*   \  Significant Imaging: I have reviewed all pertinent imaging results/findings within the past 24 hours.

## 2024-11-21 NOTE — SUBJECTIVE & OBJECTIVE
Past Medical History:   Diagnosis Date    Atrial fibrillation     Bulging disc     Cirrhosis     Colon polyp 12/29/2017    Rpt 5 yrs    EV (esophageal varices)     Hypertension 3/30/2023    Skin cancer 03/2017    Thrombocytopenia        Past Surgical History:   Procedure Laterality Date    CATHETERIZATION OF BOTH LEFT AND RIGHT HEART N/A 2/8/2023    Procedure: CATHETERIZATION, HEART, BOTH LEFT AND RIGHT;  Surgeon: Flo Malone MD;  Location: [facility] CATH LAB;  Service: Cardiology;  Laterality: N/A;  low bleeding risk 1.0%    CHOLECYSTECTOMY      COLONOSCOPY      COLONOSCOPY N/A 12/29/2017    ta rpt 2022    CORONARY ANGIOGRAPHY N/A 2/8/2023    Procedure: ANGIOGRAM, CORONARY ARTERY;  Surgeon: Flo Malone MD;  Location: [facility] CATH LAB;  Service: Cardiology;  Laterality: N/A;    HERNIA REPAIR      SKIN BIOPSY  03/2017    TONSILLECTOMY      UPPER GASTROINTESTINAL ENDOSCOPY  2011    EV    UPPER GASTROINTESTINAL ENDOSCOPY  2016    VASECTOMY         Review of patient's allergies indicates:  No Known Allergies  Current Facility-Administered Medications   Medication Frequency    0.9%  NaCl infusion (for blood administration) Q24H PRN    albumin human 25% bottle 50 g BID    albuterol-ipratropium 2.5 mg-0.5 mg/3 mL nebulizer solution 3 mL Q6H PRN    aluminum-magnesium hydroxide-simethicone 200-200-20 mg/5 mL suspension 30 mL QID PRN    ascorbic acid (vitamin C) tablet 500 mg Daily    cefTRIAXone injection 2 g Q24H    chlorhexidine 0.12 % solution 15 mL BID    cyanocobalamin tablet 250 mcg Daily    dextrose 10% bolus 125 mL 125 mL PRN    dextrose 10% bolus 250 mL 250 mL PRN    glucagon (human recombinant) injection 1 mg PRN    glucose chewable tablet 16 g PRN    glucose chewable tablet 24 g PRN    heparin (porcine) injection 5,000 Units Q8H    lactulose 20 gram/30 mL solution Soln 20 g TID    melatonin tablet 6 mg Nightly PRN    midodrine tablet 10 mg TID    mupirocin 2 % ointment BID    naloxone 0.4 mg/mL injection 0.02  mg PRN    ondansetron injection 4 mg Q8H PRN    pantoprazole EC tablet 40 mg Daily    simethicone chewable tablet 80 mg QID PRN    sodium chloride 0.9% flush 10 mL Q12H PRN    tamsulosin 24 hr capsule 0.4 mg QHS    vancomycin - pharmacy to dose pharmacy to manage frequency     Family History       Problem Relation (Age of Onset)    Cancer Maternal Uncle    Heart disease Maternal Uncle    Hyperlipidemia Brother    Ovarian cancer Sister          Tobacco Use    Smoking status: Never    Smokeless tobacco: Never   Substance and Sexual Activity    Alcohol use: Not Currently     Alcohol/week: 0.0 standard drinks of alcohol    Drug use: No    Sexual activity: Not on file     Review of Systems   Constitutional:  Negative for chills and fever.   Respiratory:  Positive for shortness of breath.    Gastrointestinal:  Positive for diarrhea. Negative for abdominal pain, constipation, nausea and vomiting.   Genitourinary:  Negative for difficulty urinating.   Skin:  Positive for wound.     Objective:     Vital Signs (Most Recent):  Temp: 97.5 °F (36.4 °C) (11/21/24 1128)  Pulse: 73 (11/21/24 1454)  Resp: 18 (11/21/24 1128)  BP: (!) 100/54 (11/21/24 1128)  SpO2: (!) 93 % (11/21/24 1128) Vital Signs (24h Range):  Temp:  [97.4 °F (36.3 °C)-97.9 °F (36.6 °C)] 97.5 °F (36.4 °C)  Pulse:  [] 73  Resp:  [18-30] 18  SpO2:  [93 %-97 %] 93 %  BP: ()/(52-65) 100/54     Weight: (!) 153 kg (337 lb 4.9 oz) (11/21/24 0751)  Body mass index is 44.5 kg/m².  Body surface area is 2.81 meters squared.    No intake/output data recorded.     Physical Exam  Vitals and nursing note reviewed.   Constitutional:       General: He is awake. He is not in acute distress.     Appearance: He is obese. He is not ill-appearing or toxic-appearing.      Comments: Boo catheter in place, yellow concentrated urine in bag   HENT:      Head: Normocephalic and atraumatic.   Eyes:      General: No scleral icterus.     Extraocular Movements: Extraocular  movements intact.      Conjunctiva/sclera: Conjunctivae normal.   Cardiovascular:      Rate and Rhythm: Normal rate and regular rhythm.   Pulmonary:      Effort: Pulmonary effort is normal. No respiratory distress.      Breath sounds: No wheezing.   Abdominal:      General: There is distension.      Palpations: Abdomen is soft.      Tenderness: There is no abdominal tenderness. There is no guarding or rebound.   Musculoskeletal:         General: No swelling.      Right lower leg: Edema present.      Left lower leg: Edema present.      Comments: 3+ pitting edema in bilateral lower extremities up to level of knees   Skin:     General: Skin is warm.      Coloration: Skin is not jaundiced.   Neurological:      Mental Status: He is alert and oriented to person, place, and time.      Motor: No weakness.   Psychiatric:         Behavior: Behavior is cooperative.        Significant Labs:  CBC:   Recent Labs   Lab 11/21/24 0324   WBC 14.06*   RBC 2.88*   HGB 7.0*   HCT 21.5*   PLT 66*   MCV 75*   MCH 24.3*   MCHC 32.6     CMP:   Recent Labs   Lab 11/21/24 0324   GLU 98   CALCIUM 9.8   ALBUMIN 3.2*   PROT 7.5   *   K 5.6*   CO2 18*   CL 96   BUN 48*   CREATININE 5.9*   ALKPHOS 45   ALT 9*   AST 27   BILITOT 7.4*     LFTs:   Recent Labs   Lab 11/21/24  0324   ALT 9*   AST 27   ALKPHOS 45   BILITOT 7.4*   PROT 7.5   ALBUMIN 3.2*     Recent Labs   Lab 11/20/24  1801   COLORU Yellow   SPECGRAV 1.015   PHUR 6.0   PROTEINUA Trace*   NITRITE Negative   LEUKOCYTESUR 3+*   HYALINECASTS 4*     Urine sodium (11/20 1808) = < 10  Urine creatinine (11/20 1808) = 166.0    All labs within the past 24 hours have been reviewed.    Significant Imaging:  All imaging within the past 24 hours have been reviewed.

## 2024-11-21 NOTE — PROGRESS NOTES
Initial US imaging of the abdomen was performed. A safe percutaneous window could not be identified.     Lila Floyd PA-C  Interventional Radiology  [phone]

## 2024-11-21 NOTE — HPI
69M with PMH of alcoholic cirrhosis, esophageal varices, Afib on eliquis, and HTN who presents for c/o worsening diffuse swelling as well as R arm pain/erythema. He was recently hospitalized 1 month ago at [facility] for volume overload in the setting of decompensated cirrhosis. He was treated with IV diuresis and discharged with adjustment to his diuretics, currently on lasix 60mg BID and metolazone 2.5mg every other week as per family. Patient reports diffuse swelling of his upper and lower extremities in addition to distended abdomen and worsening dyspnea, which he believes is due to recent medication adjustment. Family states he is non-compliant with low sodium diet and fluid restriction. Family states he has been compliant with diuretics, but rom't taken eliquis for 3 days due to issue getting refill. He also reports scratching right arm on door frame 3-4 days ago which has become red and painful after wrapping in in bandage. He claims to be compliant with medications, but is a poor historian. He follows with hepatology, not currently active on transplant list. He states he hasn't consumed alcohol for at least 9 months. Has c/o chills, but denies fever, cough, nausea, vomiting, chest pain, dysuria, hematuria, blood in stool. Workup in ED remarkable for VS: T 97.6 HR 94 RR 22 BP 95/56 O2 sat 97% on RA. Hb 6.7, MCV 75, Plt 81, PT/INR 22.6/2.2, Na 128, K 6.1, BUN/Cr 49/5.7, Alb 2.8, AST/ALT 35/9, Ammonia 104, , Trop neg, LA 2.9, CXR: Cardiac size is enlarged similar to prior.  No large volume of pleural fluid noted although there is mild blunting of the right costophrenic angle which may relate to a small amount of pleural fluid.  Suspected right basilar opacity, to be correlated clinically for infection. RUE venous doppler: No thrombus in central veins of the right upper extremity. Patient received vanc/zosyn and lasix 80mg IVP in ED and will be admitted to hospital medicine service for further  management.

## 2024-11-21 NOTE — PROGRESS NOTES
Pharmacokinetic Assessment Follow Up: IV Vancomycin    Vancomycin serum concentration assessment(s):    Vancomycin random=8.6 mcg/mL, ~17h after 1x dose of 2g    Vancomycin Regimen Plan:    Continue pulse dosing due to severe JAE.  Will give vanc 1g IV x1 today.  Vanc random in the AM.   Goal level 10-15 mcg/mL for SSTI.     Drug levels (last 3 results):  Recent Labs   Lab Result Units 11/21/24  0819   Vancomycin, Random ug/mL 8.6       Pharmacy will continue to follow and monitor vancomycin.    Thank you for the consult,   Casi Davis, PharmD, [facility]  [id number]    Patient brief summary:  Juan Carlos Yoo Sr. is a 69 y.o. male initiated on antimicrobial therapy with IV Vancomycin for treatment of skin & soft tissue infection    The patient's current regimen is pulse dosing    Actual Body Weight:   153kg    Renal Function:   Estimated Creatinine Clearance: 17.7 mL/min (A) (based on SCr of 5.9 mg/dL (H)).,         CBC (last 72 hours):  Recent Labs   Lab Result Units 11/20/24  1349 11/21/24  0324   WBC K/uL 12.67 14.06*   Hemoglobin g/dL 6.7* 7.0*   Hematocrit % 21.1* 21.5*   Platelets K/uL 81* 66*   Gran % % 87.4* 85.1*   Lymph % % 2.6* 4.9*   Mono % % 9.3 8.7   Eosinophil % % 0.0 0.1   Basophil % % 0.1 0.2   Differential Method  Automated Automated       Metabolic Panel (last 72 hours):  Recent Labs   Lab Result Units 11/20/24  1349 11/20/24  1801 11/20/24  1808 11/20/24  2210 11/21/24  0324   Sodium mmol/L 128*  --   --  131* 130*   Sodium, Urine mmol/L  --   --  <10*  --   --    Potassium mmol/L 6.1*  --   --  5.1 5.6*   Chloride mmol/L 96  --   --  96 96   CO2 mmol/L 20*  --   --  18* 18*   Glucose mg/dL 98  --   --  102 98   Glucose, UA   --  Negative  --   --   --    BUN mg/dL 49*  --   --  48* 48*   Creatinine mg/dL 5.7*  --   --  5.9* 5.9*   Creatinine, Urine mg/dL  --   --  166.0  --   --    Albumin g/dL 2.8*  --   --   --  3.2*   Total Bilirubin mg/dL 4.8*  --   --   --  7.4*   Alkaline Phosphatase U/L 47  --   --    --  45   AST U/L 35  --   --   --  27   ALT U/L 9*  --   --   --  9*   Magnesium mg/dL  --   --   --   --  2.4   Phosphorus mg/dL  --   --   --   --  5.9*       Vancomycin Administrations:  vancomycin given in the last 96 hours                     vancomycin 2 g in dextrose 5 % 500 mL IVPB (mg) 2,000 mg New Bag 11/20/24 1543                    Microbiologic Results:  Microbiology Results (last 7 days)       Procedure Component Value Units Date/Time    Blood culture x two cultures. Draw prior to antibiotics. [[id number]] Collected: 11/20/24 1444    Order Status: Completed Specimen: Blood from Peripheral, Wrist, Left Updated: 11/21/24 0115     Blood Culture, Routine No Growth to date    Narrative:      Aerobic and anaerobic    Blood culture x two cultures. Draw prior to antibiotics. [[id number]] Collected: 11/20/24 1429    Order Status: Completed Specimen: Blood from Peripheral, Hand, Right Updated: 11/21/24 0115     Blood Culture, Routine No Growth to date    Narrative:      Aerobic and anaerobic    Urine culture [[id number]] Collected: 11/20/24 1801    Order Status: No result Specimen: Urine Updated: 11/20/24 1934    (rule out SBP) Gram stain [[id number]]     Order Status: No result Specimen: Ascites     (rule out SBP) Aerobic culture [[id number]]     Order Status: No result Specimen: Ascites     (rule out SBP) Culture, Anaerobic [[id number]]     Order Status: No result Specimen: Ascites     Clostridium difficile EIA [[id number]]     Order Status: No result Specimen: Stool     Stool culture [[id number]]     Order Status: No result Specimen: Stool

## 2024-11-21 NOTE — ASSESSMENT & PLAN NOTE
Anemia is likely due to chronic disease due to Chronic liver disease. Most recent hemoglobin and hematocrit are listed below.  Recent Labs     11/20/24  1349 11/20/24  1455   HGB 6.7*  --    HCT 21.1* 23*     Plan  - Monitor serial CBC: Daily  - Transfuse PRBC if patient becomes hemodynamically unstable, symptomatic or H/H drops below 7/21.  - Patient has received 1 units of PRBCs on 11/20  - Patient's anemia is currently stable  - Hb baseline 7-8. No obvious bleeding  - Hold eliquis for now

## 2024-11-21 NOTE — ASSESSMENT & PLAN NOTE
Volume Overload  Etoh Cirrhosis  Hx of HCC s/p Y90    MELD 3.0: 36 at 11/20/2024  4:33 PM  MELD-Na: 36 at 11/20/2024  4:33 PM  Calculated from:  Serum Creatinine: 5.7 mg/dL (Using max of 3 mg/dL) at 11/20/2024  1:49 PM  Serum Sodium: 128 mmol/L at 11/20/2024  1:49 PM  Total Bilirubin: 4.8 mg/dL at 11/20/2024  1:49 PM  Serum Albumin: 2.8 g/dL at 11/20/2024  1:49 PM  INR(ratio): 2.2 at 11/20/2024  4:33 PM  Age at listing (hypothetical): 69 years  Sex: Male at 11/20/2024  4:33 PM     Progressively worsening anasarca  Patient non-compliant with fluid restriction and low sodium diet  Received lasix 80mg IVP in ED, cont lasix 80mg IV BID  Start albumin and midodrine  Obtain TTE and UA   Lactulose  Low sodium diet and 1.5L fluid restriction  US liver with doppler  Cont vanc/ceftriaxone and do infectious workup  IR paracentesis, eval for SBP  Hepatology consult

## 2024-11-21 NOTE — CONSULTS
[facility] - Telemetry Stepdown  Nephrology  Consult Note    Patient Name: Juan Carlos Yoo Sr.  MRN: [id number]  Admission Date: 11/20/2024  Hospital Length of Stay: 1 days  Attending Provider: Glory Oliva MD   Primary Care Physician: Nyla Rios FNP  Principal Problem:Decompensated cirrhosis    Inpatient consult to Nephrology  Consult performed by: Margarita Paredes MD  Consult ordered by: Giovani Wheat MD        Subjective:     HPI: Juan Carlos Yoo is a 69 year old male with hx of decompensated hepatic cirrhosis due to alcohol use, HCC s/p Y90, esophageal varices, persistent afib on eliquis, HTN, CKD3a (baseline creatinine 1.2-1.4) admitted on 11/20 for sepsis likely 2/2 SSTI involving RUE and decompensated hepatic cirrhosis with signs of volume overload. Recent admission 10/4 at [facility] for decompensated hepatic cirrhosis where he was discharged with oral diuretic regimen including furosemide 60 mg BID and metolazone 2.5 mg daily, low salt diet with fluid restriction. It is unclear if patient is adherent to these medications or lifestyle modifications. W/u notable for anemia with hb 6.7 s/p 1 unit pRBC 11/20 and JAE on CKD w elevated BUN 49/creatinine 5.9, hyperkalemia (K 6.1>5.1 after shifting), bicarb 18, elevated lactate up to 3.5. Nephrology consulted for JAE with c/o HRS    Past Medical History:   Diagnosis Date    Atrial fibrillation     Bulging disc     Cirrhosis     Colon polyp 12/29/2017    Rpt 5 yrs    EV (esophageal varices)     Hypertension 3/30/2023    Skin cancer 03/2017    Thrombocytopenia        Past Surgical History:   Procedure Laterality Date    CATHETERIZATION OF BOTH LEFT AND RIGHT HEART N/A 2/8/2023    Procedure: CATHETERIZATION, HEART, BOTH LEFT AND RIGHT;  Surgeon: Flo Malone MD;  Location: [facility] CATH LAB;  Service: Cardiology;  Laterality: N/A;  low bleeding risk 1.0%    CHOLECYSTECTOMY      COLONOSCOPY      COLONOSCOPY N/A 12/29/2017    ta rpt 2022    CORONARY ANGIOGRAPHY N/A  2/8/2023    Procedure: ANGIOGRAM, CORONARY ARTERY;  Surgeon: Flo Malone MD;  Location: [facility] CATH LAB;  Service: Cardiology;  Laterality: N/A;    HERNIA REPAIR      SKIN BIOPSY  03/2017    TONSILLECTOMY      UPPER GASTROINTESTINAL ENDOSCOPY  2011    EV    UPPER GASTROINTESTINAL ENDOSCOPY  2016    VASECTOMY         Review of patient's allergies indicates:  No Known Allergies  Current Facility-Administered Medications   Medication Frequency    0.9%  NaCl infusion (for blood administration) Q24H PRN    albumin human 25% bottle 50 g BID    albuterol-ipratropium 2.5 mg-0.5 mg/3 mL nebulizer solution 3 mL Q6H PRN    aluminum-magnesium hydroxide-simethicone 200-200-20 mg/5 mL suspension 30 mL QID PRN    ascorbic acid (vitamin C) tablet 500 mg Daily    cefTRIAXone injection 2 g Q24H    chlorhexidine 0.12 % solution 15 mL BID    cyanocobalamin tablet 250 mcg Daily    dextrose 10% bolus 125 mL 125 mL PRN    dextrose 10% bolus 250 mL 250 mL PRN    glucagon (human recombinant) injection 1 mg PRN    glucose chewable tablet 16 g PRN    glucose chewable tablet 24 g PRN    heparin (porcine) injection 5,000 Units Q8H    lactulose 20 gram/30 mL solution Soln 20 g TID    melatonin tablet 6 mg Nightly PRN    midodrine tablet 10 mg TID    mupirocin 2 % ointment BID    naloxone 0.4 mg/mL injection 0.02 mg PRN    ondansetron injection 4 mg Q8H PRN    pantoprazole EC tablet 40 mg Daily    simethicone chewable tablet 80 mg QID PRN    sodium chloride 0.9% flush 10 mL Q12H PRN    tamsulosin 24 hr capsule 0.4 mg QHS    vancomycin - pharmacy to dose pharmacy to manage frequency     Family History       Problem Relation (Age of Onset)    Cancer Maternal Uncle    Heart disease Maternal Uncle    Hyperlipidemia Brother    Ovarian cancer Sister          Tobacco Use    Smoking status: Never    Smokeless tobacco: Never   Substance and Sexual Activity    Alcohol use: Not Currently     Alcohol/week: 0.0 standard drinks of alcohol    Drug use: No     Sexual activity: Not on file     Review of Systems   Constitutional:  Negative for chills and fever.   Respiratory:  Positive for shortness of breath.    Gastrointestinal:  Positive for diarrhea. Negative for abdominal pain, constipation, nausea and vomiting.   Genitourinary:  Negative for difficulty urinating.   Skin:  Positive for wound.     Objective:     Vital Signs (Most Recent):  Temp: 97.5 °F (36.4 °C) (11/21/24 1128)  Pulse: 73 (11/21/24 1454)  Resp: 18 (11/21/24 1128)  BP: (!) 100/54 (11/21/24 1128)  SpO2: (!) 93 % (11/21/24 1128) Vital Signs (24h Range):  Temp:  [97.4 °F (36.3 °C)-97.9 °F (36.6 °C)] 97.5 °F (36.4 °C)  Pulse:  [] 73  Resp:  [18-30] 18  SpO2:  [93 %-97 %] 93 %  BP: ()/(52-65) 100/54     Weight: (!) 153 kg (337 lb 4.9 oz) (11/21/24 0751)  Body mass index is 44.5 kg/m².  Body surface area is 2.81 meters squared.    No intake/output data recorded.     Physical Exam  Vitals and nursing note reviewed.   Constitutional:       General: He is awake. He is not in acute distress.     Appearance: He is obese. He is not ill-appearing or toxic-appearing.      Comments: Boo catheter in place, yellow concentrated urine in bag   HENT:      Head: Normocephalic and atraumatic.   Eyes:      General: No scleral icterus.     Extraocular Movements: Extraocular movements intact.      Conjunctiva/sclera: Conjunctivae normal.   Cardiovascular:      Rate and Rhythm: Normal rate and regular rhythm.   Pulmonary:      Effort: Pulmonary effort is normal. No respiratory distress.      Breath sounds: No wheezing.   Abdominal:      General: There is distension.      Palpations: Abdomen is soft.      Tenderness: There is no abdominal tenderness. There is no guarding or rebound.   Musculoskeletal:         General: No swelling.      Right lower leg: Edema present.      Left lower leg: Edema present.      Comments: 3+ pitting edema in bilateral lower extremities up to level of knees   Skin:     General: Skin is  warm.      Coloration: Skin is not jaundiced.   Neurological:      Mental Status: He is alert and oriented to person, place, and time.      Motor: No weakness.   Psychiatric:         Behavior: Behavior is cooperative.        Significant Labs:  CBC:   Recent Labs   Lab 11/21/24  0324   WBC 14.06*   RBC 2.88*   HGB 7.0*   HCT 21.5*   PLT 66*   MCV 75*   MCH 24.3*   MCHC 32.6     CMP:   Recent Labs   Lab 11/21/24  0324   GLU 98   CALCIUM 9.8   ALBUMIN 3.2*   PROT 7.5   *   K 5.6*   CO2 18*   CL 96   BUN 48*   CREATININE 5.9*   ALKPHOS 45   ALT 9*   AST 27   BILITOT 7.4*     LFTs:   Recent Labs   Lab 11/21/24  0324   ALT 9*   AST 27   ALKPHOS 45   BILITOT 7.4*   PROT 7.5   ALBUMIN 3.2*     Recent Labs   Lab 11/20/24  1801   COLORU Yellow   SPECGRAV 1.015   PHUR 6.0   PROTEINUA Trace*   NITRITE Negative   LEUKOCYTESUR 3+*   HYALINECASTS 4*     Urine sodium (11/20 1808) = < 10  Urine creatinine (11/20 1808) = 166.0    All labs within the past 24 hours have been reviewed.    Significant Imaging:  All imaging within the past 24 hours have been reviewed.  Assessment/Plan:     Renal/  Hyperkalemia  Hyperkalemia is likely due to JAE.The patients most recent potassium results are listed below.  Recent Labs     11/20/24  1349 11/20/24  2210 11/21/24  0324   K 6.1* 5.1 5.6*     - Monitor for arrhythmias with EKG and/or continuous telemetry.   - Treat the hyperkalemia with Potassium Binders.   - Monitor potassium: Daily  - The patient's hyperkalemia is worsening. Will make recommendations as follows:  - Consider scheduled lokelma 10 g BID for hyperkalemia with JAE    Stage 3a chronic kidney disease  Creatinine stable for now. BMP reviewed- noted Estimated Creatinine Clearance: 17.7 mL/min (A) (based on SCr of 5.9 mg/dL (H)). according to latest data. Based on current GFR, CKD stage is stage 3 - GFR 30-59.  Monitor UOP and serial BMP and adjust therapy as needed. Renally dose meds. Avoid nephrotoxic medications and  procedures. See JAE (acute kidney injury).      JAE (acute kidney injury)  JAE is likely due to pre-renal azotemia due to intravascular volume depletion likely secondary to decompensated hepatic cirrhosis with volume overload. Hx of CKD3a with baseline creatinine is  1.2-1.4 . Most recent creatinine and eGFR are listed below.  Recent Labs     11/20/24  1349 11/20/24  2210 11/21/24  0324   CREATININE 5.7* 5.9* 5.9*   EGFRNORACEVR 10.0* 9.6* 9.6*     - JAE is worsening. Will make recommendations as follows:  - Avoid nephrotoxins and renally dose meds for GFR listed above  - Monitor urine output, serial BMP, and adjust therapy as needed  - Strict I's/O's, daily weights  - Boo catheter in place  - Urine lytes appears c/w pattern seen in hepatorenal syndrome  - Will obtain urine microscopy to further evaluate other potential etiology such as ATN  - Agree with albumin and midodrine   - Treat other potential contributing factors such as sepsis 2/2 SSTI, anemia with hemoglobin < 7.0, hemodynamic instability avoiding relative hypotension  - Maintain MAP > 70-75  - Retroperitoneal ultrasound c/w bilateral CKD, showed questionable 6 mm nonobstructing stone in the left upper pole without evidence of hydronephrosis or solid renal mass.      Thank you for your consult. We will follow-up with patient. Please contact us if you have any additional questions.    Margarita Paredes MD  Nephrology  [facility] - Telemetry Stepdown

## 2024-11-21 NOTE — PLAN OF CARE
Problem: Physical Therapy  Goal: Physical Therapy Goal  Description: Goals to be met by: 24     Patient will increase functional independence with mobility by performin. Supine to sit with [place]  2. Sit to stand transfer with [place]  3. Bed to chair transfer with [place] using No Assistive Device  4. Gait  x 150 feet with [place] using No Assistive Device.     Outcome: Progressing   PT eval completed and goals established. Pt will begin PT POC, progressing as tolerated.

## 2024-11-21 NOTE — CONSULTS
[facility]  Hepatology  Consult Note    Patient Name: Juan Carlos Yoo Sr.  MRN: [id number]  Admission Date: 11/20/2024  Hospital Length of Stay: 1 days  Code Status: Full Code   Attending Provider: Glory Oliva MD   Consulting Provider: Giovani Luna MD  Primary Care Physician: Nyla Rios FNP  Principal Problem:Decompensated cirrhosis    Inpatient consult to Hepatology  Consult performed by: Giovani Luna MD  Consult ordered by: Giovani Wheat MD        Subjective:     HPI: Juan Carlos Yoo Sr. is a 69 y.o. male with history of alcoholic cirrhosis c/b EV and HCC s/p y90 in 2023, Afib on eliquis, and HTN who presents with worsening generalized edema and Right arm pain. Patient is a difficult historian due to tangential speech and poor insight into his medical conditions. He reports his worsening edema has been going on for some time, and he was recently hospitalized 1 month ago at [facility] for volume overload in the setting of decompensated cirrhosis. He was treated with IV diuresis and discharged with adjustment to his diuretics, currently on lasix 60mg BID and metolazone 2.5mg. Patient reports diffuse swelling of his upper and lower extremities in addition to distended abdomen and some worsening dyspnea, which he believes is due to recent medication adjustment.    Workup in ED remarkable for borderline hypotensive BP of 95/56, Hb 6.7, MCV 75, Plt 81, PT/INR 22.6/2.2, Na 128, K 6.1, BUN/Cr 49/5.7 (last Cr 1.5 one month ago), Alb 2.8, Ammonia 104, , lactate 2.9. Was started on vanc and zosyn and admitted to Hospital Medicine for decompensated cirrhosis, JAE, and volume overload.      Past Medical History:   Diagnosis Date    Atrial fibrillation     Bulging disc     Cirrhosis     Colon polyp 12/29/2017    Rpt 5 yrs    EV (esophageal varices)     Hypertension 3/30/2023    Skin cancer 03/2017    Thrombocytopenia        Past Surgical History:   Procedure Laterality Date    CATHETERIZATION OF  BOTH LEFT AND RIGHT HEART N/A 2/8/2023    Procedure: CATHETERIZATION, HEART, BOTH LEFT AND RIGHT;  Surgeon: Flo Malone MD;  Location: [facility] CATH LAB;  Service: Cardiology;  Laterality: N/A;  low bleeding risk 1.0%    CHOLECYSTECTOMY      COLONOSCOPY      COLONOSCOPY N/A 12/29/2017    ta rpt 2022    CORONARY ANGIOGRAPHY N/A 2/8/2023    Procedure: ANGIOGRAM, CORONARY ARTERY;  Surgeon: Flo Malone MD;  Location: [facility] CATH LAB;  Service: Cardiology;  Laterality: N/A;    HERNIA REPAIR      SKIN BIOPSY  03/2017    TONSILLECTOMY      UPPER GASTROINTESTINAL ENDOSCOPY  2011    EV    UPPER GASTROINTESTINAL ENDOSCOPY  2016    VASECTOMY         Family History   Problem Relation Name Age of Onset    Hyperlipidemia Brother      Cancer Maternal Uncle          Leukemia    Heart disease Maternal Uncle      Ovarian cancer Sister      Colon cancer Neg Hx         Social History     Socioeconomic History    Marital status: Single   Occupational History     Employer: [employer]   Tobacco Use    Smoking status: Never    Smokeless tobacco: Never   Substance and Sexual Activity    Alcohol use: Not Currently     Alcohol/week: 0.0 standard drinks of alcohol    Drug use: No     Social Drivers of Health     Financial Resource Strain: Low Risk  (11/20/2024)    Overall Financial Resource Strain (CARDIA)     Difficulty of Paying Living Expenses: Not very hard   Food Insecurity: No Food Insecurity (11/20/2024)    Hunger Vital Sign     Worried About Running Out of Food in the Last Year: Never true     Ran Out of Food in the Last Year: Never true   Transportation Needs: No Transportation Needs (11/20/2024)    TRANSPORTATION NEEDS     Transportation : No   Stress: No Stress Concern Present (11/20/2024)    [place] [facility] [employer] - Occupational Stress Questionnaire     Feeling of Stress : Not at all   Housing Stability: Low Risk  (11/20/2024)    Housing Stability Vital Sign     Unable to Pay for Housing in the Last  Year: No     Homeless in the Last Year: No       No current facility-administered medications on file prior to encounter.     Current Outpatient Medications on File Prior to Encounter   Medication Sig Dispense Refill    aMILoride (MIDAMOR) 5 MG Tab Take 2 tablets (10 mg total) by mouth once daily. Start with 5 mg (1 tablet) daily, increase to 10 mg (2 tablets) daily if not losing 3 pounds a week. 60 tablet 11    ascorbic acid, vitamin C, (VITAMIN C) 500 MG tablet Take 500 mg by mouth once daily.      cyanocobalamin (VITAMIN B-12) 100 MCG tablet Take 100 mcg by mouth once daily.      fish oil-omega-3 fatty acids 300-1,000 mg capsule Take 1 g by mouth 2 (two) times daily.      furosemide (LASIX) 40 MG tablet TAKE ONE TABLET BY MOUTH TWICE DAILY 180 tablet 3    nadoloL (CORGARD) 20 MG tablet TAKE 2 TABLETS BY MOUTH EVERY EVENING 180 tablet 3    potassium chloride SA (KLOR-CON M20) 20 MEQ tablet Take 1 tablet (20 mEq total) by mouth 2 (two) times daily. 60 tablet 3    tamsulosin (FLOMAX) 0.4 mg Cap TAKE 1 CAPSULE (0.4 MG TOTAL) BY MOUTH EVERY EVENING. 90 capsule 2    amLODIPine (NORVASC) 10 MG tablet Take 1 tablet (10 mg total) by mouth once daily. 90 tablet 3    pantoprazole (PROTONIX) 40 MG tablet Take 1 tablet (40 mg total) by mouth once daily. 90 tablet 3       Review of patient's allergies indicates:  No Known Allergies    Review of Systems   Constitutional:  Negative for chills and fever.   HENT:  Negative for congestion and sore throat.    Eyes:  Negative for double vision and photophobia.   Respiratory:  Positive for shortness of breath. Negative for cough.    Cardiovascular:  Negative for chest pain and palpitations.   Gastrointestinal:  Negative for abdominal pain and vomiting.   Genitourinary:  Negative for dysuria and urgency.   Musculoskeletal:  Negative for myalgias and neck pain.   Neurological:  Negative for dizziness and weakness.   Psychiatric/Behavioral:  Negative for depression. The patient is not  nervous/anxious.         Objective:     Vitals:    11/21/24 0751   BP: (!) 99/57   Pulse: 92   Resp: 18   Temp: 97.9 °F (36.6 °C)         Constitutional: No acute distress, AAOx3  HENT: Normal, atraumatic  Eyes: Scleral icterus. Pupils equal and reactive.  Cardiovascular: Normal rate, regular rhythm, no murmurs  Respiratory: No distress, stable on room air  GI: Soft, some distension, non-tender to palpation. Body wall edema.  Musculoskeletal: ROM intact, no muscle wasting. Significant 3+ pitting edema noted in lower legs up to abdomen. Swelling in arms and hansd.  Skin: No lesions. Jaundiced  Neurological: No focal deficits. Mentating appropriately, unclear if baseline.  Psychiatric: Mood normal. Affect normal. Poor historian, tangential speech.    Significant Labs:  Recent Labs   Lab 11/20/24  1349 11/21/24  0324   HGB 6.7* 7.0*       Lab Results   Component Value Date    WBC 14.06 (H) 11/21/2024    HGB 7.0 (L) 11/21/2024    HCT 21.5 (L) 11/21/2024    MCV 75 (L) 11/21/2024    PLT 66 (L) 11/21/2024       Lab Results   Component Value Date     (L) 11/21/2024    K 5.6 (H) 11/21/2024    CL 96 11/21/2024    CO2 18 (L) 11/21/2024    BUN 48 (H) 11/21/2024    CREATININE 5.9 (H) 11/21/2024    CALCIUM 9.8 11/21/2024    ANIONGAP 16 11/21/2024    ESTGFRAFRICA >60.0 06/13/2022    EGFRNONAA >60.0 06/13/2022       Lab Results   Component Value Date    ALT 9 (L) 11/21/2024    AST 27 11/21/2024    GGT 30 12/13/2022    ALKPHOS 45 11/21/2024    BILITOT 7.4 (H) 11/21/2024       Lab Results   Component Value Date    INR 2.1 (H) 11/21/2024    INR 2.2 (H) 11/20/2024    INR 1.9 (H) 10/04/2024       Significant Imaging:  Reviewed pertinent radiology findings.       Assessment/Plan:     Juan Carlos Yoo Sr. is a 69 y.o. male with history of EtOH use disorder, EtOH cirrhosis, HCC s/p y90, morbid obesity BMI 44, HTN, and Afib who presents with severe anasarca and JAE. Known history of cirrhosis and is established patient of Dr. Daniels. Was  previously evaluated for transplant and was declined due to persistently positive PETHs in the past and also due to concern over BMI. Since May 2023 PETH has been negative, though patient reports he has not drank alcohol for the last 6 months at least. Presents now with decompensated cirrhosis and with significant renal injury with Cr 5.7 up from 1.5 a month prior.    Unclear etiology of cirrhosis decompensation. He does report non-adherence with fluid restriction and volume overload could be contributing; unclear if patient has been taking medications appropriately given his poor insight. Also presents with marked renal dysfunction which is new from 1 month prior, and worsening renal function which is concerning for HRS. No prior known history of CHF and last TTE in 2022 with normal systolic function. Patient does report changes to his diuretic regimen at home recently that were made while he was at [facility] and is not sure if these changes were helping him keep fluid off. No LTAC records available, but it seems that patient had home lasix increased to 60mg BID and had metolazone 2.5 every other week added. Patient with fairly limited insight into his medical conditions and his medications, and has poor social support at home with only his son nearby who is a single father.      Problem List:  Decompensated cirrhosis  Severe anasarca with ascites  Acute renal failure, concerning for HRS  Severe obesity, BMI 44  Hx of alcohol use disorder  Hx of HCC s/p y90      Recommendations:  - Follow-up PETH  - Follow-up imaging studies: TTE, retroperitoneal US and liver US  - Follow-up AFP and   - Will discuss transplant candidacy further, pending the results of the above studies  - With concern for HRS, recommend stopping diuretics at this time. Agree with Albumin 50 BID.  - Agree with Nephrology evaluation of significant acute renal failure  - Follow-up infectious workup and continue broad spectrum antibiotics.  - Please  obtain daily CBC, CMP, PT/INR.      Thank you for involving us in the care of Juan Carlos Yoo . Please call with any additional questions, concerns or changes in the patient's clinical status. We will continue to follow.     Giovani Luna MD  Internal Medicine PGY3  Hepatology

## 2024-11-21 NOTE — ASSESSMENT & PLAN NOTE
Creatine stable for now. BMP reviewed- noted Estimated Creatinine Clearance: 16.9 mL/min (A) (based on SCr of 5.7 mg/dL (H)). according to latest data. Based on current GFR, CKD stage is stage 3 - GFR 30-59.  Monitor UOP and serial BMP and adjust therapy as needed. Renally dose meds. Avoid nephrotoxic medications and procedures.    See above

## 2024-11-21 NOTE — PT/OT/SLP EVAL
Physical Therapy Evaluation    Patient Name:  Juan Carlos Yoo Sr.   MRN:  [id number]    Recommendations:     Discharge Recommendations: Moderate Intensity Therapy (may progress)   Discharge Equipment Recommendations: none   Barriers to discharge: Decreased caregiver support    Assessment:     Juan Carlos Yoo Sr. is a 69 y.o. male admitted with a medical diagnosis of Decompensated cirrhosis.  He presents with the following impairments/functional limitations: weakness, impaired endurance, impaired self care skills, impaired functional mobility, gait instability, decreased lower extremity function, decreased upper extremity function, edema, impaired skin. Patient participated well during evaluation. He was alert and oriented x4 with increased processing time/response time for mobility and communication. He required assistance with bed mobility and was able to take several steps at EOB, however is largely limited by activity tolerance at this time. Based on patient's prior level of function compared to recent decline in mobility and current presentation, they would benefit from moderate intensity post-acute therapy to address deficits and continue progressing patient towards goals.   Patient is safe to ambulate/transfer with nursing (assist of 1), encouraged to sit up in chair when able.      Rehab Prognosis: Good; patient would benefit from acute skilled PT services to address these deficits and reach maximum level of function.    Recent Surgery: * No surgery found *      Plan:     During this hospitalization, patient to be seen 4 x/week to address the identified rehab impairments via gait training, therapeutic activities, therapeutic exercises, neuromuscular re-education and progress toward the following goals:    Plan of Care Expires:  12/19/24    Subjective     Chief Complaint: edema  Patient/Family Comments/goals: progress with mobility  Pain/Comfort:  Pain Rating 1: 0/10  Pain Rating Post-Intervention 1: 0/10    Patients  cultural, spiritual, [employer]ian conflicts given the current situation: no    Living Environment:  Lives alone, H with threshold to enter.  Prior to admission, patients level of function was independent, has been more difficult the last several weeks due to edema, SOB.  Equipment used at home: none.  DME owned (not currently used): none.  Upon discharge, patient will have assistance from unspecified.    Objective:     Communicated with RN prior to session.  Patient found HOB elevated with peripheral IV, telemetry, peacock catheter  upon PT entry to room.    General Precautions: Standard, fall  Orthopedic Precautions:N/A   Braces: N/A  Respiratory Status: Room air    Exams:  Cognitive Exam:  Patient is oriented to Person, Place, Time, and Situation  Sensation:    -       Intact  Skin Integrity/Edema:      -       Skin integrity: weeping BUE  -       Edema: Moderate all extremities   RLE ROM: WNL  RLE Strength: WNL  LLE ROM: WNL  LLE Strength: WNL    Functional Mobility:  Bed Mobility:     Scooting: contact guard assistance  Supine to Sit: moderate assistance  Sit to Supine: stand by assistance  Transfers:     Sit to Stand:  contact guard assistance with no AD  Gait: patient ambulated several steps at EOB with CGA no AD  Deviations Noted: decreased gait speed, decreased step height R,L, flat foot contact R, L, and increased lateral sway R, L   Slight SOB noted following   Balance:   Sitting static: independent  Sitting dynamic: SBA  Standing static: CGA  Able to stand for several minutes  Dressing: maxA with socks      AM-PAC 6 CLICK MOBILITY  Total Score:20       Treatment & Education:  Education: patient educated on POC, need for therapy, safety with mobility, discharge recommendations and equipment recommendations. Patient verbalized understanding of all topics.   Patient educated on importance of continued mobility with between session exercises given by therapist, sitting up in chair daily when able; emphasis on  decreasing risk of deconditioning during hospital stay and improving overall function and wellbeing.    Seated AROM to BLE including knee extension and ankle DF/PF with external target provided to encourage full motion through available range.      Patient left HOB elevated with all lines intact and call button in reach.    GOALS:   Multidisciplinary Problems       Physical Therapy Goals          Problem: Physical Therapy    Goal Priority Disciplines Outcome Interventions   Physical Therapy Goal     PT, PT/OT Progressing    Description: Goals to be met by: 24     Patient will increase functional independence with mobility by performin. Supine to sit with [place]  2. Sit to stand transfer with [place]  3. Bed to chair transfer with [place] using No Assistive Device  4. Gait  x 150 feet with [place] using No Assistive Device.                          History:     Past Medical History:   Diagnosis Date    Atrial fibrillation     Bulging disc     Cirrhosis     Colon polyp 2017    Rpt 5 yrs    EV (esophageal varices)     Hypertension 3/30/2023    Skin cancer 2017    Thrombocytopenia        Past Surgical History:   Procedure Laterality Date    CATHETERIZATION OF BOTH LEFT AND RIGHT HEART N/A 2023    Procedure: CATHETERIZATION, HEART, BOTH LEFT AND RIGHT;  Surgeon: Flo Malone MD;  Location: [facility] CATH LAB;  Service: Cardiology;  Laterality: N/A;  low bleeding risk 1.0%    CHOLECYSTECTOMY      COLONOSCOPY      COLONOSCOPY N/A 2017    ta rpt     CORONARY ANGIOGRAPHY N/A 2023    Procedure: ANGIOGRAM, CORONARY ARTERY;  Surgeon: Flo Malone MD;  Location: [facility] CATH LAB;  Service: Cardiology;  Laterality: N/A;    HERNIA REPAIR      SKIN BIOPSY  2017    TONSILLECTOMY      UPPER GASTROINTESTINAL ENDOSCOPY      EV    UPPER GASTROINTESTINAL ENDOSCOPY      VASECTOMY         Time Tracking:     PT Received On: 24  PT Start Time: 1259     PT Stop  Time: 1321  PT Total Time (min): 22 min     Billable Minutes: Evaluation 12 minutes and Therapeutic Exercise 10 minutes      11/21/2024

## 2024-11-21 NOTE — ASSESSMENT & PLAN NOTE
Hyperkalemia is likely due to JAE.The patients most recent potassium results are listed below.  Recent Labs     11/20/24  1349 11/20/24  2210 11/21/24  0324   K 6.1* 5.1 5.6*     - Monitor for arrhythmias with EKG and/or continuous telemetry.   - Treat the hyperkalemia with Potassium Binders.   - Monitor potassium: Daily  - The patient's hyperkalemia is worsening. Will make recommendations as follows:  - Consider scheduled lokelma 10 g BID for hyperkalemia with JAE

## 2024-11-21 NOTE — HPI
Juan Carlos Yoo is a 69 year old male with hx of decompensated hepatic cirrhosis due to alcohol use, HCC s/p Y90, esophageal varices, persistent afib on eliquis, HTN, CKD3a (baseline creatinine 1.2-1.4) admitted on 11/20 for sepsis likely 2/2 SSTI involving RUE and decompensated hepatic cirrhosis with signs of volume overload. Recent admission 10/4 at [facility] for decompensated hepatic cirrhosis where he was discharged with oral diuretic regimen including furosemide 60 mg BID and metolazone 2.5 mg daily, low salt diet with fluid restriction. It is unclear if patient is adherent to these medications or lifestyle modifications. W/u notable for anemia with hb 6.7 s/p 1 unit pRBC 11/20 and JAE on CKD w elevated BUN 49/creatinine 5.9, hyperkalemia (K 6.1>5.1 after shifting), bicarb 18, elevated lactate up to 3.5. Nephrology consulted for JAE with c/o HRS

## 2024-11-21 NOTE — ASSESSMENT & PLAN NOTE
The likely etiology of thrombocytopenia is liver disease. The patients 3 most recent labs are listed below.  Recent Labs     11/20/24  1349 11/21/24  0324   PLT 81* 66*     Plan  - Will transfuse if platelet count is <50k (if undergoing surgical procedure or have active bleeding).

## 2024-11-21 NOTE — PLAN OF CARE
Problem: Occupational Therapy  Goal: Occupational Therapy Goal  Description: Goals to be met by: 11-30-24     Patient will increase functional independence with ADLs by performing:    UE Dressing with Set-up Assistance.  LE Dressing with Stand-by Assistance.  Grooming while standing at sink with Stand-by Assistance.  Toileting from toilet with Stand-by Assistance for hygiene and clothing management.   Step transfer: with SBA and use of LRAD  Independent with HEP to BUE to improve ROM    Outcome: Progressing

## 2024-11-21 NOTE — ED NOTES
Report received from JAZMYNE COLON. Assume care of pt. Pt resting comfortably and independently repositioned in stretcher with bed locked in lowest position for safety. NAD noted at this time. Respirations even and unlabored and visible chest rise noted.  Patient has indwelling cath at this time. Pt instructed to call if assistance is needed. Pt on continuous cardiac, BP, and O2 monitoring. Call light within reach. No needs at this time. Will continue to monitor.

## 2024-11-21 NOTE — CARE UPDATE
Unit OMI Care Support Interaction      I have reviewed the chart of Juan Carlos BROOKS Fredo Easton who is hospitalized for Decompensated cirrhosis. The patient is currently located in the following unit: [facility]        I have assisted the primary physician in management of the following:      MRSA Decolonization - Mupirocin ordered and CHG ordered         Elisa Mar PA-C  Unit Based OMI

## 2024-11-21 NOTE — ASSESSMENT & PLAN NOTE
Patient has persistent (7 days or more) atrial fibrillation. Patient is currently in atrial fibrillation. [id number] Score: 1. The patients heart rate in the last 24 hours is as follows:  Pulse  Min: 94  Max: 107     Antiarrhythmics       Anticoagulants  heparin (porcine) injection 5,000 Units, Every 8 hours, Subcutaneous    Plan  - Replete lytes with a goal of K>4, Mg >2  - Patient is anticoagulated, see medications listed above.  - Patient's afib is currently controlled  - Hold eliquis for anemia  - Hold BB for hypotension

## 2024-11-21 NOTE — ASSESSMENT & PLAN NOTE
Creatinine stable for now. BMP reviewed- noted Estimated Creatinine Clearance: 17.7 mL/min (A) (based on SCr of 5.9 mg/dL (H)). according to latest data. Based on current GFR, CKD stage is stage 3 - GFR 30-59.  Monitor UOP and serial BMP and adjust therapy as needed. Renally dose meds. Avoid nephrotoxic medications and procedures. See JAE (acute kidney injury).

## 2024-11-21 NOTE — ASSESSMENT & PLAN NOTE
JAE is likely due to pre-renal azotemia due to intravascular volume depletion likely secondary to decompensated hepatic cirrhosis with volume overload. Hx of CKD3a with baseline creatinine is  1.2-1.4 . Most recent creatinine and eGFR are listed below.  Recent Labs     11/20/24  1349 11/20/24  2210 11/21/24  0324   CREATININE 5.7* 5.9* 5.9*   EGFRNORACEVR 10.0* 9.6* 9.6*     - JAE is worsening. Will make recommendations as follows:  - Avoid nephrotoxins and renally dose meds for GFR listed above  - Monitor urine output, serial BMP, and adjust therapy as needed  - Strict I's/O's, daily weights  - Boo catheter in place  - Urine lytes appears c/w pattern seen in hepatorenal syndrome  - Will obtain urine microscopy to further evaluate other potential etiology such as ATN  - Agree with albumin and midodrine   - Treat other potential contributing factors such as sepsis 2/2 SSTI, anemia with hemoglobin < 7.0, hemodynamic instability avoiding relative hypotension  - Maintain MAP > 70-75  - Retroperitoneal ultrasound c/w bilateral CKD, showed questionable 6 mm nonobstructing stone in the left upper pole without evidence of hydronephrosis or solid renal mass.

## 2024-11-21 NOTE — PLAN OF CARE
Problem: Adult Inpatient Plan of Care  Goal: Plan of Care Review  Outcome: Progressing  Goal: Patient-Specific Goal (Individualized)  Outcome: Progressing  Goal: Absence of Hospital-Acquired Illness or Injury  Outcome: Progressing  Goal: Optimal Comfort and Wellbeing  Outcome: Progressing  Goal: Readiness for Transition of Care  Outcome: Progressing     Problem: Infection  Goal: Absence of Infection Signs and Symptoms  Outcome: Progressing     Problem: Sepsis/Septic Shock  Goal: Optimal Coping  Outcome: Progressing  Goal: Absence of Bleeding  Outcome: Progressing  Goal: Blood Glucose Level Within Targeted Range  Outcome: Progressing  Goal: Absence of Infection Signs and Symptoms  Outcome: Progressing  Goal: Optimal Nutrition Intake  Outcome: Progressing     Problem: Acute Kidney Injury/Impairment  Goal: Fluid and Electrolyte Balance  Outcome: Progressing  Goal: Improved Oral Intake  Outcome: Progressing  Goal: Effective Renal Function  Outcome: Progressing     Problem: Wound  Goal: Optimal Coping  Outcome: Progressing  Goal: Optimal Functional Ability  Outcome: Progressing  Goal: Absence of Infection Signs and Symptoms  Outcome: Progressing  Goal: Improved Oral Intake  Outcome: Progressing  Goal: Optimal Pain Control and Function  Outcome: Progressing  Goal: Skin Health and Integrity  Outcome: Progressing  Goal: Optimal Wound Healing  Outcome: Progressing     Problem: Bariatric Environmental Safety  Goal: Safety Maintained with Care  Outcome: Progressing

## 2024-11-21 NOTE — H&P
"  [facility] - Emergency Dept  [facility] Medicine  History & Physical    Patient Name: Juan Carlos Yoo Sr.  MRN: [id number]  Patient Class: IP- Inpatient  Admission Date: 11/20/2024  Attending Physician: Giovani Wheat MD   Primary Care Provider: Nyla Rios FNP         Patient information was obtained from patient, relative(s), and ER records.     Subjective:     Principal Problem:Decompensated cirrhosis    Chief Complaint:   Chief Complaint   Patient presents with    Shortness of Breath     Has been seen at multiple hospitals recently "and wants his medication straightened out.  Making me shake"  endorses SOB.  Sweling noted to left hand with wrap in place to lower forearm        HPI: 69M with PMH of alcoholic cirrhosis, esophageal varices, Afib on eliquis, and HTN who presents for c/o worsening diffuse swelling as well as R arm pain/erythema. He was recently hospitalized 1 month ago at [facility] for volume overload in the setting of decompensated cirrhosis. He was treated with IV diuresis and discharged with adjustment to his diuretics, currently on lasix 60mg BID and metolazone 2.5mg every other week as per family. Patient reports diffuse swelling of his upper and lower extremities in addition to distended abdomen and worsening dyspnea, which he believes is due to recent medication adjustment. Family states he is non-compliant with low sodium diet and fluid restriction. Family states he has been compliant with diuretics, but rom't taken eliquis for 3 days due to issue getting refill. He also reports scratching right arm on door frame 3-4 days ago which has become red and painful after wrapping in in bandage. He claims to be compliant with medications, but is a poor historian. He follows with hepatology, not currently active on transplant list. He states he hasn't consumed alcohol for at least 9 months. Has c/o chills, but denies fever, cough, nausea, vomiting, chest pain, dysuria, hematuria, blood in stool. " Workup in ED remarkable for VS: T 97.6 HR 94 RR 22 BP 95/56 O2 sat 97% on RA. Hb 6.7, MCV 75, Plt 81, PT/INR 22.6/2.2, Na 128, K 6.1, BUN/Cr 49/5.7, Alb 2.8, AST/ALT 35/9, Ammonia 104, , Trop neg, LA 2.9, CXR: Cardiac size is enlarged similar to prior.  No large volume of pleural fluid noted although there is mild blunting of the right costophrenic angle which may relate to a small amount of pleural fluid.  Suspected right basilar opacity, to be correlated clinically for infection. RUE venous doppler: No thrombus in central veins of the right upper extremity. Patient received vanc/zosyn and lasix 80mg IVP in ED and will be admitted to hospital medicine service for further management.    Past Medical History:   Diagnosis Date    Atrial fibrillation     Bulging disc     Cirrhosis     Colon polyp 12/29/2017    Rpt 5 yrs    EV (esophageal varices)     Hypertension 3/30/2023    Skin cancer 03/2017    Thrombocytopenia        Past Surgical History:   Procedure Laterality Date    CATHETERIZATION OF BOTH LEFT AND RIGHT HEART N/A 2/8/2023    Procedure: CATHETERIZATION, HEART, BOTH LEFT AND RIGHT;  Surgeon: Flo Malone MD;  Location: [facility] CATH LAB;  Service: Cardiology;  Laterality: N/A;  low bleeding risk 1.0%    CHOLECYSTECTOMY      COLONOSCOPY      COLONOSCOPY N/A 12/29/2017    ta rpt 2022    CORONARY ANGIOGRAPHY N/A 2/8/2023    Procedure: ANGIOGRAM, CORONARY ARTERY;  Surgeon: Flo Malone MD;  Location: [facility] CATH LAB;  Service: Cardiology;  Laterality: N/A;    HERNIA REPAIR      SKIN BIOPSY  03/2017    TONSILLECTOMY      UPPER GASTROINTESTINAL ENDOSCOPY  2011    EV    UPPER GASTROINTESTINAL ENDOSCOPY  2016    VASECTOMY         Review of patient's allergies indicates:  No Known Allergies    Current Facility-Administered Medications on File Prior to Encounter   Medication    sodium chloride 0.9% flush 10 mL     Current Outpatient Medications on File Prior to Encounter   Medication Sig    aMILoride (MIDAMOR)  5 MG Tab Take 2 tablets (10 mg total) by mouth once daily. Start with 5 mg (1 tablet) daily, increase to 10 mg (2 tablets) daily if not losing 3 pounds a week.    ascorbic acid, vitamin C, (VITAMIN C) 500 MG tablet Take 500 mg by mouth once daily.    cyanocobalamin (VITAMIN B-12) 100 MCG tablet Take 100 mcg by mouth once daily.    fish oil-omega-3 fatty acids 300-1,000 mg capsule Take 1 g by mouth 2 (two) times daily.    furosemide (LASIX) 40 MG tablet TAKE ONE TABLET BY MOUTH TWICE DAILY    nadoloL (CORGARD) 20 MG tablet TAKE 2 TABLETS BY MOUTH EVERY EVENING    potassium chloride SA (KLOR-CON M20) 20 MEQ tablet Take 1 tablet (20 mEq total) by mouth 2 (two) times daily.    tamsulosin (FLOMAX) 0.4 mg Cap TAKE 1 CAPSULE (0.4 MG TOTAL) BY MOUTH EVERY EVENING.    amLODIPine (NORVASC) 10 MG tablet Take 1 tablet (10 mg total) by mouth once daily.    pantoprazole (PROTONIX) 40 MG tablet Take 1 tablet (40 mg total) by mouth once daily.     Family History       Problem Relation (Age of Onset)    Cancer Maternal Uncle    Heart disease Maternal Uncle    Hyperlipidemia Brother    Ovarian cancer Sister          Tobacco Use    Smoking status: Never    Smokeless tobacco: Never   Substance and Sexual Activity    Alcohol use: Not Currently     Alcohol/week: 0.0 standard drinks of alcohol    Drug use: No    Sexual activity: Not on file     Review of Systems   Constitutional:  Positive for chills and fatigue. Negative for activity change, appetite change, diaphoresis, fever and unexpected weight change.   Respiratory:  Positive for shortness of breath. Negative for cough, chest tightness, wheezing and stridor.    Cardiovascular:  Positive for leg swelling. Negative for chest pain and palpitations.   Gastrointestinal:  Positive for abdominal distention and abdominal pain. Negative for blood in stool, constipation, diarrhea, nausea and vomiting.   Endocrine: Negative for cold intolerance and heat intolerance.   Genitourinary:  Positive  for scrotal swelling. Negative for difficulty urinating, dysuria, flank pain and frequency.   Musculoskeletal:  Positive for gait problem. Negative for arthralgias, joint swelling and myalgias.   Skin:  Positive for rash and wound.   Neurological:  Positive for weakness. Negative for dizziness, light-headedness and headaches.   Psychiatric/Behavioral:  Positive for decreased concentration.      Objective:     Vital Signs (Most Recent):  Temp: 97.6 °F (36.4 °C) (11/20/24 1226)  Pulse: 96 (11/20/24 1815)  Resp: 18 (11/20/24 1815)  BP: (!) 92/53 (11/20/24 1815)  SpO2: 96 % (11/20/24 1815) Vital Signs (24h Range):  Temp:  [97.6 °F (36.4 °C)] 97.6 °F (36.4 °C)  Pulse:  [] 96  Resp:  [18-24] 18  SpO2:  [96 %-98 %] 96 %  BP: ()/(53-90) 92/53     Weight: 131.5 kg (290 lb)  Body mass index is 38.26 kg/m².     Physical Exam  Constitutional:       Appearance: He is obese. He is ill-appearing (chronic).   HENT:      Head: Normocephalic and atraumatic.   Eyes:      Conjunctiva/sclera: Conjunctivae normal.      Pupils: Pupils are equal, round, and reactive to light.   Cardiovascular:      Rate and Rhythm: Normal rate. Rhythm irregular.      Heart sounds: Normal heart sounds.   Pulmonary:      Effort: Pulmonary effort is normal. No respiratory distress.      Breath sounds: Rales present. No wheezing.   Abdominal:      General: Bowel sounds are normal. There is distension.      Palpations: Abdomen is soft.      Tenderness: There is abdominal tenderness.      Hernia: A hernia (umbilical and L inguinal hernia) is present.   Musculoskeletal:         General: Normal range of motion.      Cervical back: Normal range of motion and neck supple.      Right lower leg: Edema present.      Left lower leg: Edema present.   Skin:     General: Skin is warm and dry.      Coloration: Skin is not jaundiced.      Findings: Erythema (RUE) and rash present.   Neurological:      General: No focal deficit present.      Mental Status: He is  alert and oriented to person, place, and time. He is confused.      Comments: Asterix[employer]   Psychiatric:         Attention and Perception: He is inattentive.          CRANIAL NERVES     CN III, IV, VI   Pupils are equal, round, and reactive to light.     Significant Labs: All pertinent labs within the past 24 hours have been reviewed.    Significant Imaging: I have reviewed all pertinent imaging results/findings within the past 24 hours.  Assessment/Plan:     * Decompensated cirrhosis  Volume Overload  Etoh Cirrhosis  Hx of HCC s/p Y90    MELD 3.0: 36 at 11/20/2024  4:33 PM  MELD-Na: 36 at 11/20/2024  4:33 PM  Calculated from:  Serum Creatinine: 5.7 mg/dL (Using max of 3 mg/dL) at 11/20/2024  1:49 PM  Serum Sodium: 128 mmol/L at 11/20/2024  1:49 PM  Total Bilirubin: 4.8 mg/dL at 11/20/2024  1:49 PM  Serum Albumin: 2.8 g/dL at 11/20/2024  1:49 PM  INR(ratio): 2.2 at 11/20/2024  4:33 PM  Age at listing (hypothetical): 69 years  Sex: Male at 11/20/2024  4:33 PM     Progressively worsening anasarca  Patient non-compliant with fluid restriction and low sodium diet  Received lasix 80mg IVP in ED, cont lasix 80mg IV BID  Start albumin and midodrine  Obtain TTE and UA   Lactulose  Low sodium diet and 1.5L fluid restriction  US liver with doppler  Cont vanc/ceftriaxone and do infectious workup  IR paracentesis, eval for SBP  Check PETH  Hepatology consult      Microcytic anemia  Anemia is likely due to chronic disease due to Chronic liver disease. Most recent hemoglobin and hematocrit are listed below.  Recent Labs     11/20/24  1349 11/20/24  1455   HGB 6.7*  --    HCT 21.1* 23*       Plan  - Monitor serial CBC: Daily  - Transfuse PRBC if patient becomes hemodynamically unstable, symptomatic or H/H drops below 7/21.  - Patient has received 1 units of PRBCs on 11/20  - Patient's anemia is currently stable  - Hb baseline 7-8. No obvious bleeding  - Hold eliquis for now      Stage 3a chronic kidney disease  Creatine stable for  now. BMP reviewed- noted Estimated Creatinine Clearance: 16.9 mL/min (A) (based on SCr of 5.7 mg/dL (H)). according to latest data. Based on current GFR, CKD stage is stage 3 - GFR 30-59.  Monitor UOP and serial BMP and adjust therapy as needed. Renally dose meds. Avoid nephrotoxic medications and procedures.    See above    Sepsis  This patient does have evidence of infective focus  My overall impression is sepsis.  Source: Skin and Soft Tissue (location RUE)  Antibiotics given-   Antibiotics (72h ago, onward)      Start     Stop Route Frequency Ordered    11/20/24 2100  cefTRIAXone injection 2 g         -- IV Every 24 hours (non-standard times) 11/20/24 1848          Latest lactate reviewed-  Recent Labs   Lab 11/20/24  1633 11/20/24  1935   LACTATE 2.9*  --    POCLAC  --  3.42*       Organ dysfunction indicated by Acute kidney injury    Fluid challenge Contraindicated- Fluid bolus is contraindicated in this patient due to End Stage Liver Disease and Volume overload due to- ESLD      Post- resuscitation assessment No - Post resuscitation assessment not needed       Will Not start Pressors- Levophed for MAP of 65  Source control achieved by: IV abx  Cont vanc/ceftriaxone   RUE erythema/tenderness - cellulitis vs contact dermatitis   Infectious workup in process      JAE (acute kidney injury)  Baseline creatinine is  1.5 . Most recent creatinine and eGFR are listed below.    Recent Labs     11/20/24  1349   CREATININE 5.7*   EGFRNORACEVR 10.0*        Plan  - JAE is worsening. Will continue current treatment  - Avoid nephrotoxins and renally dose meds for GFR listed above  - Monitor urine output, serial BMP, and adjust therapy as needed  - Etiology includes volume overload, obstruction, hypotension, possible HRS  - Likely unable to get accurate PVR due to ascites  - Place peacock catheter and monitor strict I&O  - IV diuresis  - Start midodrine and albumin  - Check UA, urine Na  - Renal ultrasound  - Consult  nephrology      Atrial fibrillation  Patient has persistent (7 days or more) atrial fibrillation. Patient is currently in atrial fibrillation. [id number] Score: 1. The patients heart rate in the last 24 hours is as follows:  Pulse  Min: 94  Max: 107     Antiarrhythmics       Anticoagulants  heparin (porcine) injection 5,000 Units, Every 8 hours, Subcutaneous    Plan  - Replete lytes with a goal of K>4, Mg >2  - Patient is anticoagulated, see medications listed above.  - Patient's afib is currently controlled  - Hold eliquis for anemia  - Hold BB for hypotension    Coagulopathy          VTE Risk Mitigation (From admission, onward)           Ordered     heparin (porcine) injection 5,000 Units  Every 8 hours         11/20/24 1622     IP VTE HIGH RISK PATIENT  Once         11/20/24 1622     Place sequential compression device  Until discontinued         11/20/24 1622                                    Giovani Wheat MD  Department of Hospital Medicine  [facility] - Emergency Dept

## 2024-11-22 PROBLEM — E87.5 HYPERKALEMIA: Status: RESOLVED | Noted: 2024-11-21 | Resolved: 2024-11-22

## 2024-11-22 PROBLEM — Z71.89 GOALS OF CARE, COUNSELING/DISCUSSION: Status: ACTIVE | Noted: 2017-12-29

## 2024-11-22 PROBLEM — R78.81 POSITIVE BLOOD CULTURE: Status: ACTIVE | Noted: 2024-11-22

## 2024-11-22 LAB
ALBUMIN SERPL BCP-MCNC: 3.9 G/DL (ref 3.5–5.2)
ALP SERPL-CCNC: 47 U/L (ref 40–150)
ALT SERPL W/O P-5'-P-CCNC: 6 U/L (ref 10–44)
ANION GAP SERPL CALC-SCNC: 14 MMOL/L (ref 8–16)
APTT PPP: 38.3 SEC (ref 21–32)
AST SERPL-CCNC: 8 U/L (ref 10–40)
BACTERIA UR CULT: ABNORMAL
BASOPHILS # BLD AUTO: 0.03 K/UL (ref 0–0.2)
BASOPHILS # BLD AUTO: 0.04 K/UL (ref 0–0.2)
BASOPHILS NFR BLD: 0.3 % (ref 0–1.9)
BASOPHILS NFR BLD: 0.4 % (ref 0–1.9)
BILIRUB SERPL-MCNC: 6.8 MG/DL (ref 0.1–1)
BLD PROD TYP BPU: NORMAL
BLOOD UNIT EXPIRATION DATE: NORMAL
BLOOD UNIT TYPE CODE: 6200
BLOOD UNIT TYPE: NORMAL
BUN SERPL-MCNC: 51 MG/DL (ref 8–23)
CALCIUM SERPL-MCNC: 10.1 MG/DL (ref 8.7–10.5)
CHLORIDE SERPL-SCNC: 96 MMOL/L (ref 95–110)
CO2 SERPL-SCNC: 22 MMOL/L (ref 23–29)
CODING SYSTEM: NORMAL
CREAT SERPL-MCNC: 6.4 MG/DL (ref 0.5–1.4)
CROSSMATCH INTERPRETATION: NORMAL
DAT IGG-SP REAG RBC-IMP: NORMAL
DIFFERENTIAL METHOD BLD: ABNORMAL
DIFFERENTIAL METHOD BLD: ABNORMAL
DISPENSE STATUS: NORMAL
EOSINOPHIL # BLD AUTO: 0.1 K/UL (ref 0–0.5)
EOSINOPHIL # BLD AUTO: 0.1 K/UL (ref 0–0.5)
EOSINOPHIL NFR BLD: 0.4 % (ref 0–8)
EOSINOPHIL NFR BLD: 0.8 % (ref 0–8)
ERYTHROCYTE [DISTWIDTH] IN BLOOD BY AUTOMATED COUNT: 20.5 % (ref 11.5–14.5)
ERYTHROCYTE [DISTWIDTH] IN BLOOD BY AUTOMATED COUNT: 21.4 % (ref 11.5–14.5)
EST. GFR  (NO RACE VARIABLE): 8.7 ML/MIN/1.73 M^2
FIBRINOGEN PPP-MCNC: 132 MG/DL (ref 182–400)
GLUCOSE SERPL-MCNC: 95 MG/DL (ref 70–110)
HAPTOGLOB SERPL-MCNC: 15 MG/DL (ref 30–250)
HCT VFR BLD AUTO: 21.5 % (ref 40–54)
HCT VFR BLD AUTO: 22.6 % (ref 40–54)
HGB BLD-MCNC: 7.2 G/DL (ref 14–18)
HGB BLD-MCNC: 7.2 G/DL (ref 14–18)
IMM GRANULOCYTES # BLD AUTO: 0.06 K/UL (ref 0–0.04)
IMM GRANULOCYTES # BLD AUTO: 0.07 K/UL (ref 0–0.04)
IMM GRANULOCYTES NFR BLD AUTO: 0.4 % (ref 0–0.5)
IMM GRANULOCYTES NFR BLD AUTO: 0.7 % (ref 0–0.5)
INR PPP: 1.9 (ref 0.8–1.2)
LACTATE SERPL-SCNC: 1.4 MMOL/L (ref 0.5–2.2)
LDH SERPL L TO P-CCNC: 242 U/L (ref 110–260)
LYMPHOCYTES # BLD AUTO: 0.6 K/UL (ref 1–4.8)
LYMPHOCYTES # BLD AUTO: 0.7 K/UL (ref 1–4.8)
LYMPHOCYTES NFR BLD: 4 % (ref 18–48)
LYMPHOCYTES NFR BLD: 8.3 % (ref 18–48)
MAGNESIUM SERPL-MCNC: 2.6 MG/DL (ref 1.6–2.6)
MCH RBC QN AUTO: 25.4 PG (ref 27–31)
MCH RBC QN AUTO: 25.4 PG (ref 27–31)
MCHC RBC AUTO-ENTMCNC: 31.9 G/DL (ref 32–36)
MCHC RBC AUTO-ENTMCNC: 33.5 G/DL (ref 32–36)
MCV RBC AUTO: 76 FL (ref 82–98)
MCV RBC AUTO: 80 FL (ref 82–98)
MONOCYTES # BLD AUTO: 0.8 K/UL (ref 0.3–1)
MONOCYTES # BLD AUTO: 1.1 K/UL (ref 0.3–1)
MONOCYTES NFR BLD: 6.7 % (ref 4–15)
MONOCYTES NFR BLD: 8.9 % (ref 4–15)
MRSA ID BY PCR: NEGATIVE
NEUTROPHILS # BLD AUTO: 13.8 K/UL (ref 1.8–7.7)
NEUTROPHILS # BLD AUTO: 6.9 K/UL (ref 1.8–7.7)
NEUTROPHILS NFR BLD: 80.9 % (ref 38–73)
NEUTROPHILS NFR BLD: 88.2 % (ref 38–73)
NRBC BLD-RTO: 0 /100 WBC
NRBC BLD-RTO: 0 /100 WBC
PATIENT COLLECTION POSITION: NORMAL PG/ML
PHOSPHATE SERPL-MCNC: 6.1 MG/DL (ref 2.7–4.5)
PLATELET # BLD AUTO: 69 K/UL (ref 150–450)
PLATELET # BLD AUTO: 72 K/UL (ref 150–450)
PMV BLD AUTO: 11.3 FL (ref 9.2–12.9)
PMV BLD AUTO: 11.9 FL (ref 9.2–12.9)
POTASSIUM SERPL-SCNC: 4.8 MMOL/L (ref 3.5–5.1)
PROCALCITONIN SERPL IA-MCNC: 0.2 NG/ML
PROT SERPL-MCNC: 7.6 G/DL (ref 6–8.4)
PROTHROMBIN TIME: 19.9 SEC (ref 9–12.5)
RBC # BLD AUTO: 2.83 M/UL (ref 4.6–6.2)
RBC # BLD AUTO: 2.84 M/UL (ref 4.6–6.2)
RENIN PLAS-CCNC: 1440.4 PG/ML
RETICS/RBC NFR AUTO: 2.4 % (ref 0.4–2)
SODIUM SERPL-SCNC: 132 MMOL/L (ref 136–145)
STAPH AUREUS ID BY PCR: NEGATIVE
TRANS ERYTHROCYTES VOL PATIENT: NORMAL ML
VANCOMYCIN SERPL-MCNC: 14.9 UG/ML
WBC # BLD AUTO: 15.71 K/UL (ref 3.9–12.7)
WBC # BLD AUTO: 8.52 K/UL (ref 3.9–12.7)

## 2024-11-22 PROCEDURE — 36415 COLL VENOUS BLD VENIPUNCTURE: CPT | Performed by: STUDENT IN AN ORGANIZED HEALTH CARE EDUCATION/TRAINING PROGRAM

## 2024-11-22 PROCEDURE — 25000003 PHARM REV CODE 250: Performed by: STUDENT IN AN ORGANIZED HEALTH CARE EDUCATION/TRAINING PROGRAM

## 2024-11-22 PROCEDURE — 63600175 PHARM REV CODE 636 W HCPCS: Performed by: STUDENT IN AN ORGANIZED HEALTH CARE EDUCATION/TRAINING PROGRAM

## 2024-11-22 PROCEDURE — 85384 FIBRINOGEN ACTIVITY: CPT | Performed by: STUDENT IN AN ORGANIZED HEALTH CARE EDUCATION/TRAINING PROGRAM

## 2024-11-22 PROCEDURE — 84145 PROCALCITONIN (PCT): CPT | Performed by: STUDENT IN AN ORGANIZED HEALTH CARE EDUCATION/TRAINING PROGRAM

## 2024-11-22 PROCEDURE — 36415 COLL VENOUS BLD VENIPUNCTURE: CPT | Mod: XB | Performed by: STUDENT IN AN ORGANIZED HEALTH CARE EDUCATION/TRAINING PROGRAM

## 2024-11-22 PROCEDURE — P9047 ALBUMIN (HUMAN), 25%, 50ML: HCPCS | Mod: JZ,JG | Performed by: STUDENT IN AN ORGANIZED HEALTH CARE EDUCATION/TRAINING PROGRAM

## 2024-11-22 PROCEDURE — 83615 LACTATE (LD) (LDH) ENZYME: CPT | Performed by: STUDENT IN AN ORGANIZED HEALTH CARE EDUCATION/TRAINING PROGRAM

## 2024-11-22 PROCEDURE — 99499 UNLISTED E&M SERVICE: CPT | Mod: ,,, | Performed by: STUDENT IN AN ORGANIZED HEALTH CARE EDUCATION/TRAINING PROGRAM

## 2024-11-22 PROCEDURE — 25000003 PHARM REV CODE 250

## 2024-11-22 PROCEDURE — 99223 1ST HOSP IP/OBS HIGH 75: CPT | Mod: ,,, | Performed by: INTERNAL MEDICINE

## 2024-11-22 PROCEDURE — 63600175 PHARM REV CODE 636 W HCPCS

## 2024-11-22 PROCEDURE — 80202 ASSAY OF VANCOMYCIN: CPT | Performed by: STUDENT IN AN ORGANIZED HEALTH CARE EDUCATION/TRAINING PROGRAM

## 2024-11-22 PROCEDURE — 83605 ASSAY OF LACTIC ACID: CPT | Performed by: STUDENT IN AN ORGANIZED HEALTH CARE EDUCATION/TRAINING PROGRAM

## 2024-11-22 PROCEDURE — 86880 COOMBS TEST DIRECT: CPT | Performed by: STUDENT IN AN ORGANIZED HEALTH CARE EDUCATION/TRAINING PROGRAM

## 2024-11-22 PROCEDURE — 84100 ASSAY OF PHOSPHORUS: CPT | Performed by: INTERNAL MEDICINE

## 2024-11-22 PROCEDURE — 85025 COMPLETE CBC W/AUTO DIFF WBC: CPT | Performed by: INTERNAL MEDICINE

## 2024-11-22 PROCEDURE — 25000003 PHARM REV CODE 250: Performed by: PHYSICIAN ASSISTANT

## 2024-11-22 PROCEDURE — 83735 ASSAY OF MAGNESIUM: CPT | Performed by: INTERNAL MEDICINE

## 2024-11-22 PROCEDURE — 99233 SBSQ HOSP IP/OBS HIGH 50: CPT | Mod: ,,, | Performed by: INTERNAL MEDICINE

## 2024-11-22 PROCEDURE — 36415 COLL VENOUS BLD VENIPUNCTURE: CPT | Performed by: INTERNAL MEDICINE

## 2024-11-22 PROCEDURE — 85610 PROTHROMBIN TIME: CPT | Performed by: INTERNAL MEDICINE

## 2024-11-22 PROCEDURE — 25000003 PHARM REV CODE 250: Performed by: INTERNAL MEDICINE

## 2024-11-22 PROCEDURE — 85045 AUTOMATED RETICULOCYTE COUNT: CPT | Performed by: STUDENT IN AN ORGANIZED HEALTH CARE EDUCATION/TRAINING PROGRAM

## 2024-11-22 PROCEDURE — 85025 COMPLETE CBC W/AUTO DIFF WBC: CPT | Mod: 91 | Performed by: STUDENT IN AN ORGANIZED HEALTH CARE EDUCATION/TRAINING PROGRAM

## 2024-11-22 PROCEDURE — 84244 ASSAY OF RENIN: CPT | Performed by: STUDENT IN AN ORGANIZED HEALTH CARE EDUCATION/TRAINING PROGRAM

## 2024-11-22 PROCEDURE — 27000207 HC ISOLATION

## 2024-11-22 PROCEDURE — P9021 RED BLOOD CELLS UNIT: HCPCS | Performed by: STUDENT IN AN ORGANIZED HEALTH CARE EDUCATION/TRAINING PROGRAM

## 2024-11-22 PROCEDURE — 63600175 PHARM REV CODE 636 W HCPCS: Performed by: INTERNAL MEDICINE

## 2024-11-22 PROCEDURE — 80053 COMPREHEN METABOLIC PANEL: CPT | Performed by: INTERNAL MEDICINE

## 2024-11-22 PROCEDURE — 87040 BLOOD CULTURE FOR BACTERIA: CPT | Performed by: STUDENT IN AN ORGANIZED HEALTH CARE EDUCATION/TRAINING PROGRAM

## 2024-11-22 PROCEDURE — 20000000 HC ICU ROOM

## 2024-11-22 PROCEDURE — 97110 THERAPEUTIC EXERCISES: CPT

## 2024-11-22 PROCEDURE — 83010 ASSAY OF HAPTOGLOBIN QUANT: CPT | Performed by: STUDENT IN AN ORGANIZED HEALTH CARE EDUCATION/TRAINING PROGRAM

## 2024-11-22 PROCEDURE — 82239 BILE ACIDS TOTAL: CPT

## 2024-11-22 PROCEDURE — 85730 THROMBOPLASTIN TIME PARTIAL: CPT | Performed by: STUDENT IN AN ORGANIZED HEALTH CARE EDUCATION/TRAINING PROGRAM

## 2024-11-22 RX ORDER — FUROSEMIDE 10 MG/ML
120 INJECTION INTRAMUSCULAR; INTRAVENOUS ONCE
Status: DISCONTINUED | OUTPATIENT
Start: 2024-11-22 | End: 2024-11-24

## 2024-11-22 RX ORDER — ALBUMIN HUMAN 250 G/1000ML
25 SOLUTION INTRAVENOUS 2 TIMES DAILY
Status: DISCONTINUED | OUTPATIENT
Start: 2024-11-22 | End: 2024-11-22

## 2024-11-22 RX ORDER — NOREPINEPHRINE BITARTRATE 0.02 MG/ML
0-3 INJECTION, SOLUTION INTRAVENOUS CONTINUOUS
Status: DISCONTINUED | OUTPATIENT
Start: 2024-11-22 | End: 2024-11-23

## 2024-11-22 RX ORDER — SODIUM CHLORIDE 0.9 % (FLUSH) 0.9 %
10 SYRINGE (ML) INJECTION
Status: DISCONTINUED | OUTPATIENT
Start: 2024-11-22 | End: 2024-12-31 | Stop reason: HOSPADM

## 2024-11-22 RX ORDER — FLUDROCORTISONE ACETATE 0.1 MG/1
100 TABLET ORAL DAILY
Status: DISCONTINUED | OUTPATIENT
Start: 2024-11-22 | End: 2024-11-27

## 2024-11-22 RX ORDER — OCTREOTIDE ACETATE 50 UG/ML
50 INJECTION, SOLUTION INTRAVENOUS; SUBCUTANEOUS ONCE
Status: COMPLETED | OUTPATIENT
Start: 2024-11-22 | End: 2024-11-22

## 2024-11-22 RX ORDER — FUROSEMIDE 10 MG/ML
120 INJECTION INTRAMUSCULAR; INTRAVENOUS ONCE
Status: COMPLETED | OUTPATIENT
Start: 2024-11-22 | End: 2024-11-22

## 2024-11-22 RX ORDER — PANTOPRAZOLE SODIUM 40 MG/10ML
40 INJECTION, POWDER, LYOPHILIZED, FOR SOLUTION INTRAVENOUS 2 TIMES DAILY
Status: DISCONTINUED | OUTPATIENT
Start: 2024-11-22 | End: 2024-11-23

## 2024-11-22 RX ORDER — NOREPINEPHRINE BITARTRATE/D5W 4MG/250ML
0-.2 PLASTIC BAG, INJECTION (ML) INTRAVENOUS CONTINUOUS
Status: DISCONTINUED | OUTPATIENT
Start: 2024-11-22 | End: 2024-11-22

## 2024-11-22 RX ADMIN — CEFTRIAXONE 2 G: 2 INJECTION, POWDER, FOR SOLUTION INTRAMUSCULAR; INTRAVENOUS at 09:11

## 2024-11-22 RX ADMIN — VANCOMYCIN HYDROCHLORIDE 500 MG: 500 INJECTION, POWDER, LYOPHILIZED, FOR SOLUTION INTRAVENOUS at 11:11

## 2024-11-22 RX ADMIN — CHLORHEXIDINE GLUCONATE 0.12% ORAL RINSE 15 ML: 1.2 LIQUID ORAL at 09:11

## 2024-11-22 RX ADMIN — LACTULOSE 20 G: 20 SOLUTION ORAL at 09:11

## 2024-11-22 RX ADMIN — OCTREOTIDE ACETATE 50 MCG/HR: 500 INJECTION, SOLUTION INTRAVENOUS; SUBCUTANEOUS at 09:11

## 2024-11-22 RX ADMIN — MIDODRINE HYDROCHLORIDE 10 MG: 5 TABLET ORAL at 09:11

## 2024-11-22 RX ADMIN — PHYTONADIONE 10 MG: 10 INJECTION, EMULSION INTRAMUSCULAR; INTRAVENOUS; SUBCUTANEOUS at 12:11

## 2024-11-22 RX ADMIN — LACTULOSE 20 G: 20 SOLUTION ORAL at 04:11

## 2024-11-22 RX ADMIN — OCTREOTIDE ACETATE 50 MCG/HR: 500 INJECTION, SOLUTION INTRAVENOUS; SUBCUTANEOUS at 12:11

## 2024-11-22 RX ADMIN — HYDROCORTISONE SODIUM SUCCINATE 100 MG: 100 INJECTION, POWDER, FOR SOLUTION INTRAMUSCULAR; INTRAVENOUS at 05:11

## 2024-11-22 RX ADMIN — HYDROCORTISONE SODIUM SUCCINATE 100 MG: 100 INJECTION, POWDER, FOR SOLUTION INTRAMUSCULAR; INTRAVENOUS at 09:11

## 2024-11-22 RX ADMIN — Medication 250 MCG: at 09:11

## 2024-11-22 RX ADMIN — ALBUMIN (HUMAN) 50 G: 12.5 SOLUTION INTRAVENOUS at 09:11

## 2024-11-22 RX ADMIN — MIDODRINE HYDROCHLORIDE 10 MG: 5 TABLET ORAL at 04:11

## 2024-11-22 RX ADMIN — FLUDROCORTISONE ACETATE 100 MCG: 0.1 TABLET ORAL at 05:11

## 2024-11-22 RX ADMIN — PANTOPRAZOLE SODIUM 40 MG: 40 INJECTION, POWDER, LYOPHILIZED, FOR SOLUTION INTRAVENOUS at 09:11

## 2024-11-22 RX ADMIN — HEPARIN SODIUM 5000 UNITS: 5000 INJECTION INTRAVENOUS; SUBCUTANEOUS at 06:11

## 2024-11-22 RX ADMIN — OXYCODONE HYDROCHLORIDE AND ACETAMINOPHEN 500 MG: 500 TABLET ORAL at 09:11

## 2024-11-22 RX ADMIN — PANTOPRAZOLE SODIUM 40 MG: 40 TABLET, DELAYED RELEASE ORAL at 09:11

## 2024-11-22 RX ADMIN — TAMSULOSIN HYDROCHLORIDE 0.4 MG: 0.4 CAPSULE ORAL at 09:11

## 2024-11-22 RX ADMIN — MUPIROCIN: 20 OINTMENT TOPICAL at 09:11

## 2024-11-22 RX ADMIN — FUROSEMIDE 120 MG: 10 INJECTION, SOLUTION INTRAVENOUS at 10:11

## 2024-11-22 RX ADMIN — OCTREOTIDE ACETATE 50 MCG: 50 INJECTION, SOLUTION INTRAVENOUS; SUBCUTANEOUS at 12:11

## 2024-11-22 RX ADMIN — NOREPINEPHRINE BITARTRATE 0.02 MCG/KG/MIN: 16 SOLUTION INTRAVENOUS at 11:11

## 2024-11-22 NOTE — ASSESSMENT & PLAN NOTE
The likely etiology of thrombocytopenia is liver disease. The patients 3 most recent labs are listed below.  Recent Labs     11/21/24 2016 11/22/24  0449 11/22/24  1041   PLT 67* 72* 69*       Plan  - Will transfuse if platelet count is <50k (if undergoing surgical procedure or have active bleeding).

## 2024-11-22 NOTE — PROGRESS NOTES
Pharmacokinetic Assessment Follow Up: IV Vancomycin    Vancomycin serum concentration assessment(s):    Vancomycin random=14.9 mcg/mL    Vancomycin Regimen Plan:    Redose with vanc 500mg x1.  Continue pulse dosing due to severe JAE.  Vanc random in the AM. Goal 15-20 as patient has possible bacteremia.     Drug levels (last 3 results):  Recent Labs   Lab Result Units 11/21/24  0819 11/22/24  0449   Vancomycin, Random ug/mL 8.6 14.9       Pharmacy will continue to follow and monitor vancomycin.    Thank you for the consult,   Casi Davis, PharmD, [facility]  [phone]    Patient brief summary:  Juan Carlos Yoo Sr. is a 69 y.o. male initiated on antimicrobial therapy with IV Vancomycin for treatment of skin & soft tissue infection/?bacteremia    The patient's current regimen is pulse dosing    Actual Body Weight:   153kg    Renal Function:   Estimated Creatinine Clearance: 16 mL/min (A) (based on SCr of 6.4 mg/dL (H)).,         CBC (last 72 hours):  Recent Labs   Lab Result Units 11/20/24  1349 11/21/24  0324 11/21/24 2016 11/22/24  0449   WBC K/uL 12.67 14.06* 6.21 15.71*   Hemoglobin g/dL 6.7* 7.0* 6.8* 7.2*   Hematocrit % 21.1* 21.5* 21.0* 21.5*   Platelets K/uL 81* 66* 67* 72*   Gran % % 87.4* 85.1*  --  88.2*   Lymph % % 2.6* 4.9*  --  4.0*   Mono % % 9.3 8.7  --  6.7   Eosinophil % % 0.0 0.1  --  0.4   Basophil % % 0.1 0.2  --  0.3   Differential Method  Automated Automated  --  Automated       Metabolic Panel (last 72 hours):  Recent Labs   Lab Result Units 11/20/24  1349 11/20/24  1801 11/20/24  1808 11/20/24  2210 11/21/24 0324 11/21/24 2016 11/22/24 0449   Sodium mmol/L 128*  --   --  131* 130* 130* 132*   Sodium, Urine mmol/L  --   --  <10*  --   --   --   --    Potassium mmol/L 6.1*  --   --  5.1 5.6* 5.0 4.8   Chloride mmol/L 96  --   --  96 96 95 96   CO2 mmol/L 20*  --   --  18* 18* 22* 22*   Glucose mg/dL 98  --   --  102 98 114* 95   Glucose, UA   --  Negative  --   --   --   --   --    BUN mg/dL 49*  --    --  48* 48* 52* 51*   Creatinine mg/dL 5.7*  --   --  5.9* 5.9* 6.3* 6.4*   Creatinine, Urine mg/dL  --   --  166.0  --   --   --   --    Albumin g/dL 2.8*  --   --   --  3.2*  --  3.9   Total Bilirubin mg/dL 4.8*  --   --   --  7.4*  --  6.8*   Alkaline Phosphatase U/L 47  --   --   --  45  --  47   AST U/L 35  --   --   --  27  --  8*   ALT U/L 9*  --   --   --  9*  --  6*   Magnesium mg/dL  --   --   --   --  2.4  --  2.6   Phosphorus mg/dL  --   --   --   --  5.9*  --  6.1*       Vancomycin Administrations:  vancomycin given in the last 96 hours                     vancomycin 2 g in dextrose 5 % 500 mL IVPB (mg) 2,000 mg New Bag 11/20/24 1543                    Microbiologic Results:  Microbiology Results (last 7 days)       Procedure Component Value Units Date/Time    Blood culture x two cultures. Draw prior to antibiotics. [[id number]] Collected: 11/20/24 1429    Order Status: Completed Specimen: Blood from Peripheral, Hand, Right Updated: 11/22/24 0926     Blood Culture, Routine Gram stain aer bottle: Gram positive cocci in clusters resembling Staph      Results called to and read back by:Crystal Navarrete RN 11/21/2024  23:02    Narrative:      Aerobic and anaerobic    Blood culture x two cultures. Draw prior to antibiotics. [[id number]] Collected: 11/20/24 1444    Order Status: Completed Specimen: Blood from Peripheral, Wrist, Left Updated: 11/22/24 0926     Blood Culture, Routine Gram stain aer bottle: Gram positive rods      Results called to and read back by:Crystal Navarrete RN 11/21/2024  21:58    Narrative:      Aerobic and anaerobic    Blood culture [[id number]] Collected: 11/22/24 0903    Order Status: Sent Specimen: Blood Updated: 11/22/24 0924    Blood culture [[id number]] Collected: 11/22/24 0904    Order Status: Sent Specimen: Blood Updated: 11/22/24 0924    Urine culture [[id number]]  (Abnormal) Collected: 11/20/24 1801    Order Status: Completed Specimen: Urine Updated: 11/22/24 0301     Urine  Culture, Routine COAGULASE-NEGATIVE STAPHYLOCOCCUS SPECIES  10,000 - 49,999 cfu/ml  Susceptibility testing not routinely performed.      Narrative:      Specimen Source->Urine    MRSA/SA Rapid ID by PCR from Blood culture [[id number]] Collected: 11/20/24 1429    Order Status: Completed Updated: 11/22/24 0010     Staph aureus ID by PCR Negative     Methicillin Resistant ID by PCR Negative    Narrative:      Aerobic and anaerobic    Rapid Organism ID by PCR (from Blood culture) [[id number]] Collected: 11/20/24 1444    Order Status: Completed Updated: 11/21/24 2202     Enterococcus faecalis Not Detected     Enterococcus faecium Not Detected     Listeria monocytogenes Not Detected     Staphylococcus spp. Not Detected     Staphylococcus aureus Not Detected     Staphylococcus epidermidis Not Detected     Staphylococcus lugdunensis Not Detected     Streptococcus species Not Detected     Streptococcus agalactiae Not Detected     Streptococcus pneumoniae Not Detected     Streptococcus pyogenes Not Detected     Acinetobacter calcoaceticus/baumannii complex Not Detected     Bacteroides fragilis Not Detected     Enterobacterales Not Detected     Enterobacter cloacae complex Not Detected     Escherichia coli Not Detected     Klebsiella aerogenes Not Detected     Klebsiella oxytoca Not Detected     Klebsiella pneumoniae group Not Detected     Proteus Not Detected     Salmonella sp Not Detected     Serratia marcescens Not Detected     Haemophilus influenzae Not Detected     Neisseria meningtidis Not Detected     Pseudomonas aeruginosa Not Detected     Stenotrophomonas maltophilia Not Detected     Candida albicans Not Detected     Candida auris Not Detected     Candida glabrata Not Detected     Candida krusei Not Detected     Candida parapsilosis Not Detected     Candida tropicalis Not Detected     Cryptococcus neoformans/gattii Not Detected     CTX-M (ESBL ) Test Not Applicable     IMP (Carbapenem resistant) Test Not  Applicable     KPC resistance gene (Carbapenem resistant) Test Not Applicable     mcr-1  Test Not Applicable     mec A/C  Test Not Applicable     mec A/C and MREJ (MRSA) gene Test Not Applicable     NDM (Carbapenem resistant) Test Not Applicable     OXA-48-like (Carbapenem resistant) Test Not Applicable     van A/B (VRE gene) Test Not Applicable     VIM (Carbapenem resistant) Test Not Applicable    Narrative:      Aerobic and anaerobic    (rule out SBP) Gram stain [[id number]]     Order Status: No result Specimen: Ascites     (rule out SBP) Aerobic culture [[id number]]     Order Status: No result Specimen: Ascites     (rule out SBP) Culture, Anaerobic [[id number]]     Order Status: No result Specimen: Ascites     Clostridium difficile EIA [[id number]]     Order Status: Canceled Specimen: Stool     Stool culture [[id number]]     Order Status: No result Specimen: Stool

## 2024-11-22 NOTE — HPI
Juan Carlos Yoo is a 69 male with PMH of alcoholic cirrhosis, esophageal varices, Afib on eliquis, and HTN who presents for c/o worsening diffuse swelling as well as R arm pain/erythema. He was recently hospitalized 1 month ago at [facility] for volume overload in the setting of decompensated cirrhosis. He was treated with IV diuresis and discharged with adjustment to his diuretics, currently on lasix 60mg BID and metolazone 2.5mg every other week as per family. Patient reports diffuse swelling of his upper and lower extremities in addition to distended abdomen and worsening dyspnea, which he believes is due to recent medication adjustment. Family states he is non-compliant with low sodium diet and fluid restriction. Family states he has been compliant with diuretics, but rom't taken eliquis for 3 days due to issue getting refill. He also reports scratching right arm on door frame 3-4 days ago which has become red and painful after wrapping in in bandage. He claims to be compliant with medications, but is a poor historian. He follows with hepatology, not currently active on transplant list. He states he hasn't consumed alcohol for at least 9 months. Has c/o chills, but denies fever, cough, nausea, vomiting, chest pain, dysuria, hematuria, blood in stool. Workup in ED remarkable for VS: T 97.6 HR 94 RR 22 BP 95/56 O2 sat 97% on RA. Hb 6.7, MCV 75, Plt 81, PT/INR 22.6/2.2, Na 128, K 6.1, BUN/Cr 49/5.7, Alb 2.8, AST/ALT 35/9, Ammonia 104, , Trop neg, LA 2.9, CXR: Cardiac size is enlarged similar to prior.  No large volume of pleural fluid noted although there is mild blunting of the right costophrenic angle which may relate to a small amount of pleural fluid.  Suspected right basilar opacity, to be correlated clinically for infection. RUE venous doppler: No thrombus in central veins of the right upper extremity. Patient received vanc/zosyn and lasix 80mg IVP in ED and will be admitted to hospital medicine service  for further management.     Pt was admitted to hospital medicine. Blood cultures positive for Gram-positive cocci and Gram-negative rods. ID has been consulted. S/p 2 units PRBC for Hemoglobin dropped 6.8 on 11/21. Pt was on Eliquis for atrial fibrillation prior to admission which was held.  Hepatology following patient's clinical course, but are not evaluating him for transplant at this time. Diuretics were held because of concern for HRS.  Patient was started on albumin and midodrine per Nephrology recommendations for HRS.  Renal function continued to worsen and recommendation per Nephrology to transfer to ICU for maintaining goal map over 85 on Levophed.  ICU was notified and patient to be assessed to be transferred to ICU.

## 2024-11-22 NOTE — ASSESSMENT & PLAN NOTE
I have reviewed hospital notes from   service and other specialty providers. I have also reviewed CBC, CMP/BMP,  cultures and imaging with my interpretation as documented.      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 ID consulted for discordant positive bld cxs.     Pt c/o worsening diffuse swelling Rt arm with pain and erythema after bumping on door frame few days ago; no grossly c/f cellulitis or SSTI. RUE U/S neg for DVT.  Pt AF, VSS, HDS, wbc nml.  Index bld cxs 11/20 with discordant gram stains, 1 set with GPRs, the other with GPCs resemebling staph. MS/MRSA PCR negative, BCID negative. Suspect positive bld cxs represent contaminant. Repeat bld cxs sent 11/22. TTE neg for IE or veg.    Ucx +coag-neg staph; though without urinary sxs and low colony count, suspect not clinically relevant.  RP-U/S neg for  anomaly, no stones or hydronephrosis. Pt with abd distension, but no Abd pain. Abd U/S did not reveal significant  fluid for paracentesis; only trace ascites  (IR w/o safe window) and small left pleural effusion as noted on chest-XR, with Rt basilar opacity. Pt denies cough, but endorses SOB on exertion.     Pt on empiric IV-vanc and ceftriaxone.     Recommendations / Plan:  If ongoing c/f GI bleed (Hg 6.8 on arrival), may continue empiric IV-ceftriaxone. [Of note, small ascites, but No abd pain on exam to suggest peritonitis at this time)  May continue empiric Iv-vanc for now pending further ID on bld cxs gram stain with GPC resembling staph, may be likely contaminant. But out of caution, will keep vanc on board for now as pt has some minor abrasions which could have served as source for entry, and high risk for complications from infection.  If index bld cx returns as coag-neg staph, then it is likely a contaminant, and not clinically significant. [Of note, pt has coag-neg staph on current Ucx  but without urinary sxs, and has had prior Ucx with coag neg gabriella nicky 2/2 poor collection technique / contam?]  Will f/u repeat bld cxs 11/22.      -- Discussed with ID staff and primary team   -- ID will continue to follow w/ further recs.

## 2024-11-22 NOTE — ASSESSMENT & PLAN NOTE
Anemia is likely due to chronic disease due to Chronic liver disease. Most recent hemoglobin and hematocrit are listed below.  Recent Labs     11/21/24  2016 11/22/24  0449 11/22/24  1041   HGB 6.8* 7.2* 7.2*   HCT 21.0* 21.5* 22.6*       Plan  - Monitor serial CBC: Daily  - Transfuse PRBC if patient becomes hemodynamically unstable, symptomatic or H/H drops below 7/21.  - Patient has received 1 units of PRBCs on 11/20 and 1 PRBC on 11/21  - Patient's anemia is currently stable  - Hb baseline 7-8. No obvious bleeding  - Eliquis held on admission.  DVT prophylaxis held.

## 2024-11-22 NOTE — ASSESSMENT & PLAN NOTE
Patient has persistent (7 days or more) atrial fibrillation. Patient is currently in atrial fibrillation. [id number] Score: 1. The patients heart rate in the last 24 hours is as follows:  Pulse  Min: 73  Max: 127       Plan  - Patient's afib is currently controlled  - Hold eliquis for anemia  - Hold BB for hypotension

## 2024-11-22 NOTE — PROGRESS NOTES
[facility] - Telemetry Stepdown  Nephrology  Progress Note    Patient Name: Juan Carlos Yoo Sr.  MRN: [id number]  Admission Date: 11/20/2024  Hospital Length of Stay: 2 days  Attending Provider: Aditya Saha MD   Primary Care Physician: Nyla Rios FNP  Principal Problem:Decompensated cirrhosis    Subjective:     HPI: Juan Carlos Yoo is a 69 year old male with hx of decompensated hepatic cirrhosis due to alcohol use, HCC s/p Y90, esophageal varices, persistent afib on eliquis, HTN, CKD3a (baseline creatinine 1.2-1.4) admitted on 11/20 for sepsis likely 2/2 SSTI involving RUE and decompensated hepatic cirrhosis with signs of volume overload. Recent admission 10/4 at [facility] for decompensated hepatic cirrhosis where he was discharged with oral diuretic regimen including furosemide 60 mg BID and metolazone 2.5 mg daily, low salt diet with fluid restriction. It is unclear if patient is adherent to these medications or lifestyle modifications. W/u notable for anemia with hb 6.7 s/p 1 unit pRBC 11/20 and JAE on CKD w elevated BUN 49/creatinine 5.9, hyperkalemia (K 6.1>5.1 after shifting), bicarb 18, elevated lactate up to 3.5. Nephrology consulted for JAE with c/o HRS    Interval History: NAEON. Patient reports no improvement of symptoms, SOB worse with exertion. UOP 450cc over past 24 hours. Worsening renal function, creatinine 6.4 today from 5.9.     Review of patient's allergies indicates:  No Known Allergies  Current Facility-Administered Medications   Medication Frequency    albuterol-ipratropium 2.5 mg-0.5 mg/3 mL nebulizer solution 3 mL Q6H PRN    aluminum-magnesium hydroxide-simethicone 200-200-20 mg/5 mL suspension 30 mL QID PRN    ascorbic acid (vitamin C) tablet 500 mg Daily    cefTRIAXone injection 2 g Q24H    chlorhexidine 0.12 % solution 15 mL BID    cyanocobalamin tablet 250 mcg Daily    dextrose 10% bolus 125 mL 125 mL PRN    dextrose 10% bolus 250 mL 250 mL PRN    glucagon (human recombinant) injection 1 mg  PRN    glucose chewable tablet 16 g PRN    glucose chewable tablet 24 g PRN    lactulose 20 gram/30 mL solution Soln 20 g TID    melatonin tablet 6 mg Nightly PRN    midodrine tablet 10 mg TID    mupirocin 2 % ointment BID    naloxone 0.4 mg/mL injection 0.02 mg PRN    octreotide (SANDOSTATIN) 500 mcg in 0.9% NaCl 100 mL infusion Continuous    ondansetron injection 4 mg Q8H PRN    pantoprazole injection 40 mg BID    phytonadione vitamin k (AQUA-MEPHYTON) 10 mg in D5W 50 mL IVPB Daily    simethicone chewable tablet 80 mg QID PRN    sodium chloride 0.9% flush 10 mL Q12H PRN    sodium chloride 0.9% flush 10 mL PRN    tamsulosin 24 hr capsule 0.4 mg QHS    vancomycin - pharmacy to dose pharmacy to manage frequency       Objective:     Vital Signs (Most Recent):  Temp: 97.4 °F (36.3 °C) (11/22/24 1100)  Pulse: 93 (11/22/24 1533)  Resp: 20 (11/22/24 1100)  BP: 118/68 (11/22/24 1533)  SpO2: (!) 91 % (11/22/24 1533) Vital Signs (24h Range):  Temp:  [97.3 °F (36.3 °C)-97.8 °F (36.6 °C)] 97.4 °F (36.3 °C)  Pulse:  [] 93  Resp:  [18-21] 20  SpO2:  [86 %-96 %] 91 %  BP: (100-118)/(52-72) 118/68     Weight: (!) 148 kg (326 lb 4.5 oz) (11/22/24 0400)  Body mass index is 43.05 kg/m².  Body surface area is 2.76 meters squared.    I/O last 3 completed shifts:  In: 634.5 [Blood:634.5]  Out: 450 [Urine:450]     Physical Exam  Vitals and nursing note reviewed.   Constitutional:       General: He is awake. He is not in acute distress.     Appearance: He is obese. He is not ill-appearing or toxic-appearing.      Comments: Boo catheter in place, yellow concentrated urine in bag   HENT:      Head: Normocephalic and atraumatic.   Eyes:      General: No scleral icterus.     Extraocular Movements: Extraocular movements intact.      Conjunctiva/sclera: Conjunctivae normal.   Cardiovascular:      Rate and Rhythm: Normal rate and regular rhythm.   Pulmonary:      Effort: Pulmonary effort is normal. No respiratory distress.      Breath  sounds: No wheezing.   Abdominal:      General: There is distension.      Palpations: Abdomen is soft.      Tenderness: There is no abdominal tenderness. There is no guarding or rebound.   Musculoskeletal:         General: No swelling.      Right lower leg: Edema present.      Left lower leg: Edema present.      Comments: 3+ pitting edema in bilateral lower extremities up to level of knees   Skin:     General: Skin is warm.      Coloration: Skin is not jaundiced.   Neurological:      Mental Status: He is alert and oriented to person, place, and time.      Motor: No weakness.   Psychiatric:         Behavior: Behavior is cooperative.          Significant Labs:  CBC:   Recent Labs   Lab 11/22/24  1041   WBC 8.52   RBC 2.83*   HGB 7.2*   HCT 22.6*   PLT 69*   MCV 80*   MCH 25.4*   MCHC 31.9*     CMP:   Recent Labs   Lab 11/22/24  0449   GLU 95   CALCIUM 10.1   ALBUMIN 3.9   PROT 7.6   *   K 4.8   CO2 22*   CL 96   BUN 51*   CREATININE 6.4*   ALKPHOS 47   ALT 6*   AST 8*   BILITOT 6.8*     All labs within the past 24 hours have been reviewed.     Significant Imaging:  All imaging within the past 24 hours have been reviewed.   Assessment/Plan:     Renal/  Stage 3a chronic kidney disease  Creatinine stable for now. BMP reviewed- noted Estimated Creatinine Clearance: 16 mL/min (A) (based on SCr of 6.4 mg/dL (H)). according to latest data. Based on current GFR, CKD stage is stage 3 - GFR 30-59.  Monitor UOP and serial BMP and adjust therapy as needed. Renally dose meds. Avoid nephrotoxic medications and procedures. See JAE (acute kidney injury).      JAE (acute kidney injury)  JAE is likely due to pre-renal azotemia due to intravascular volume depletion likely secondary to decompensated hepatic cirrhosis with volume overload. Hx of CKD3a with baseline creatinine is  1.2-1.4 . Most recent creatinine and eGFR are listed below.  Recent Labs     11/21/24  0324 11/21/24 2016 11/22/24  0449   CREATININE 5.9* 6.3* 6.4*    EGFRNORACEVR 9.6* 8.8* 8.7*       - JAE is worsening. Will make recommendations as follows:  - Avoid nephrotoxins and renally dose meds for GFR listed above  - Monitor urine output, serial BMP, and adjust therapy as needed  - Strict I's/O's, daily weights  - Boo catheter in place  - Urine lytes and microscopy concerning for hepatorenal syndrome  - Recommend CCM consult for initiation of vasopressors to maintain MAP goal as treatment for hepatorenal syndrome  - Agree with albumin, midodrine, octreotide  - Diurese today with furosemide 120 mg IV x1, monitor urinary output closely  - Treat other potential contributing factors such as sepsis 2/2 SSTI, anemia with hemoglobin < 7.0, hemodynamic instability avoiding relative hypotension  - Maintain MAP > 80-85  - Retroperitoneal ultrasound c/w bilateral CKD, showed questionable 6 mm nonobstructing stone in the left upper pole without evidence of hydronephrosis or solid renal mass.        Thank you for your consult. We will follow-up with patient. Please contact us if you have any additional questions.    Margarita Paredes MD  Nephrology  [facility] - Telemetry Stepdown

## 2024-11-22 NOTE — ASSESSMENT & PLAN NOTE
Advance Care Planning     Code Status  In light of the patients advanced and life limiting illness,I engaged the the patient and family in a voluntary conversation about the patient's preferences for care  at the very end of life. The patient wishes to have a natural, peaceful death.  Along those lines, the patient wishes to have CPR or other invasive treatments performed when his heart and/or breathing stops. I communicated to the patient and family. Continue FULL CODE.    A total of 15 min was spent on advance care planning, goals of care discussion, emotional support, formulating and communicating prognosis and exploring burden/benefit of various approaches of treatment. This discussion occurred on a fully voluntary basis with the verbal consent of the patient and/or family.

## 2024-11-22 NOTE — ASSESSMENT & PLAN NOTE
"This patient does have evidence of infective focus  My overall impression is sepsis.  Source: Abdominal  Antibiotics given-   Antibiotics (72h ago, onward)      Start     Stop Route Frequency Ordered    11/21/24 2100  mupirocin 2 % ointment         11/26/24 2059 Nasl 2 times daily 11/21/24 1329    11/20/24 2100  cefTRIAXone injection 2 g         -- IV Every 24 hours (non-standard times) 11/20/24 1848    11/20/24 1722  vancomycin - pharmacy to dose  (vancomycin IVPB (PEDS and ADULTS))        Placed in "And" Linked Group    -- IV pharmacy to manage frequency 11/20/24 1623          Latest lactate reviewed-  Recent Labs   Lab 11/20/24  1935 11/20/24  2210 11/22/24  0903   LACTATE  --    < > 1.4   POCLAC 3.42*  --   --     < > = values in this interval not displayed.     Organ dysfunction indicated by Acute kidney injury    Fluid challenge Contraindicated- Fluid bolus is contraindicated in this patient due to End Stage Liver Disease     Post- resuscitation assessment No - Post resuscitation assessment not needed       Will Not start Pressors- Levophed for MAP of 65  Source control achieved by: antibiotics  "

## 2024-11-22 NOTE — PLAN OF CARE
Problem: Occupational Therapy  Goal: Occupational Therapy Goal  Description: Goals to be met by: 11-30-24     Patient will increase functional independence with ADLs by performing:    UE Dressing with Set-up Assistance.  LE Dressing with Stand-by Assistance.  Grooming while standing at sink with Stand-by Assistance.  Toileting from toilet with Stand-by Assistance for hygiene and clothing management.   Step transfer: with SBA and use of LRAD  Independent with HEP to BUE to improve ROM    Outcome: Progressing     Goals remain appropriate

## 2024-11-22 NOTE — ASSESSMENT & PLAN NOTE
"This patient does have evidence of infective focus  My overall impression is sepsis.  Source: Skin and Soft Tissue (location RUE), blood cultures with Gram-positive cocci and Gram-negative rods   Antibiotics given-   Antibiotics (72h ago, onward)      Start     Stop Route Frequency Ordered    11/21/24 2100  mupirocin 2 % ointment         11/26/24 2059 Nasl 2 times daily 11/21/24 1329    11/20/24 2100  cefTRIAXone injection 2 g         -- IV Every 24 hours (non-standard times) 11/20/24 1848    11/20/24 1722  vancomycin - pharmacy to dose  (vancomycin IVPB (PEDS and ADULTS))        Placed in "And" Linked Group    -- IV pharmacy to manage frequency 11/20/24 1623          Latest lactate reviewed-  Recent Labs   Lab 11/20/24  1935 11/20/24  2210 11/22/24  0903   LACTATE  --    < > 1.4   POCLAC 3.42*  --   --     < > = values in this interval not displayed.       Organ dysfunction indicated by Acute kidney injury    Source control achieved by: IV abx, cont vanc/ceftriaxone   RUE erythema/tenderness - cellulitis   No pocket for safe paracentesis and hence cannot rule out SB, repeat blood cultures obtained 11/22   Infectious workup in process  Infectious disease consulted    "

## 2024-11-22 NOTE — ASSESSMENT & PLAN NOTE
MELD 3.0: 35 at 11/22/2024  4:49 AM  MELD-Na: 35 at 11/22/2024  4:49 AM  Calculated from:  Serum Creatinine: 6.4 mg/dL (Using max of 3 mg/dL) at 11/22/2024  4:49 AM  Serum Sodium: 132 mmol/L at 11/22/2024  4:49 AM  Total Bilirubin: 6.8 mg/dL at 11/22/2024  4:49 AM  Serum Albumin: 3.9 g/dL (Using max of 3.5 g/dL) at 11/22/2024  4:49 AM  INR(ratio): 1.9 at 11/22/2024  4:49 AM  Age at listing (hypothetical): 69 years  Sex: Male at 11/22/2024  4:49 AM    --Hepatology following, appreciat recs  --Continue lactulose, rifaximin

## 2024-11-22 NOTE — HPI
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 ID consulted for discordant positive bld cxs.     For background, He was recently hospitalized 1 month ago at [facility] for volume overload in the setting of decompensated cirrhosis. He was treated with IV diuresis and discharged with adjustment to his diuretics, currently on lasix 60mg BID and metolazone 2.5mg every other week as per family. Patient reports diffuse swelling of his upper and lower extremities in addition to distended abdomen and worsening dyspnea, which he believes is due to recent medication adjustment. Family states he is non-compliant with low sodium diet and fluid restriction. Family states he has been compliant with diuretics, but hasn't taken eliquis for 3 days due to issue getting refill. Pt claims to be compliant with medications, but is a poor historian. He follows with hepatology, not currently active on transplant list. He states he hasn't consumed alcohol for at least 9 months. Has c/o chills, but denies fever, cough, nausea, vomiting, chest pain, dysuria, hematuria, blood in stool.  Pt c/o worsening diffuse swelling Rt arm with pain and erythema after scratching on door fram few days ago. RUE U/S neg for DVT. Pt AF, VSS, HDS, wbc 15 uptrend. Index bld cxs 11/20 with discordant gram stains, 1 set with GPRs, the other with GPCs resemebling staph. MS/MRSA PCR negative, BCID negative. Suspect positive bld cxs represent contaaminant. Repeat bld cxs sent 11/22.     Ucx +coag-neg staph; though without urinary sxs. RP-U/S neg for  anomaly, no stones or hydronephrosis. Abd U/S did not reveal significant  fluid for paracentesis; only trace ascits and small left pleural effusion as noted on chest-XR, with Rt basilar opacity. Pt with infrequent and non-productive cough.     Pt on empiric IV-vanc and ceftriaxone.

## 2024-11-22 NOTE — HOSPITAL COURSE
69 y.o. male with history of EtOH use disorder, EtOH cirrhosis, HCC s/p y90, morbid obesity BMI 44, HTN, and Afib who presents with severe anasarca and JAE.      Appreciate hepatology and nephrology recommendations.  Diuretics were held because of concern for HRS.  Patient was started on albumin and midodrine per Nephrology recommendations for HRS.  Renal function continued to worsen and recommendation per Nephrology to transfer to ICU for maintaining goal map over 85 on Levophed.  ICU was notified and patient to be assessed to be transferred to ICU.     Patient also has Gram-positive cocci and Gram-negative rods in his blood now.  Possible sources could be got translocation and barrier related infection through skin as he has been significantly edematous and has been oozing.  Has been on vancomycin and Rocephin.  Id was consulted this morning.  Repeat blood cultures were obtained.     Continues to have anemia.  Hemoglobin dropped on 11/20 and was given 1 unit of packed red blood cells.  Did not respond appropriately.  Hemoglobin dropped again to 6.8 on 11/21 and was given 1 unit of packed red blood cells.  Hemoglobin again on 11/22 is 7.2.  No reported melena or hematochezia.  Patient was on Eliquis for atrial fibrillation prior to admission which was held.  Given history of esophageal varices and portal hypertensive gastropathy whereas also could be component of slow bleeding, under production due to CKD and hemolysis while also with decompensated cirrhosis.  Started on Protonix IV b.i.d. and octreotide.     Also clarified with hepatology.  Given patient's current infection we will await ID recommendations however will be challenging to consider transplant evaluation at this time.  Current concerns regarding transplant include higher BMI, frailty / debility.      Goal clarification with the patient and family.  Patient at this time wants to be full code and continue with Levophed in ICU.  They would consider  discussion with palliative care in a day or so if things do not get better.      In MICU patient started on Levophed, however titration remained difficult given tachycardic response from the patient.      11/24 Trialysis and arterial lines placed overnight and currently on levo and norepi. SLED today.     11/25 Boo dc. Increased midodrine. Continue steroids until d/c pressors. Discussed with Nephrology about our concern for sustained use of pressors to achieve their MAP goals of 85. Will follow up tomorrow.      11/26 Platelets 48. Repeat 38 confirming thrombocytopenia. Concern for HIT. D/c heparin. Increased lactulose dosing. Bladder scan revealed urine in bladder. Boo placed. Off vaso. Continuing levo. Maintain MAP goals 80. PT/OT consulted.      11/27 MAP goal 75-80. Heparin antibody result pending.      11/28 Pt with abdominal pain, decreased bowel movements, emesis. Lactic acid 2.9 and repeat 2.7. Less concerned for mesenteric ischemia and more of a concern was for SBO. NG tube placed with subsequent suction of 500mL fluid. Abdomen less distended after placement and pt subsequently had bowel movement. MAP Goal of 60 with plan to come off pressors entirely and switch to dialysis after permacath, as he is not  liver transplant eligible at this time.      11/29 Pt with ileus. Clear liquids for now. Platelets 24. HIT versus zosyn induced? Peripheral smear sent. Zosyn changed to kaylah. Consent and transfuse 1U. GOC conversation today. Pt opting for long term dialysis.      11/30 naeon. Started back on heparin. One time dose of 120mg lasix today. Hold off SLED/SCUF today and give IV lasix 120 mg once; plan for SLED/SCUF tomorrow      12/1 Patient is becoming more dependent on dialysis. Not a current liver transplant candidate. Still complaining of abdominal pain after discontinuing the NGT. AXR with evidence of dilated bowel loops. Brown bomb administered. The days following administration of the brown bomb,  patient had more frequent bowel movements and was able to pass flatulence. Constipation eventually resolved.   Given the need for pressor support, he was worked up for adrenal insufficiency which was positive. Consequently, he was started on steroids for adrenal insufficiency. His blood pressures improved, however, he still required pressor support, particularly during dialysis. Pressor support was eventually weaned off. Patient was started on midodrine to help with BP. Nephrology trialed intermittent HD and patient seemed to tolerate it well. Nephrology recommended placement of tunneled catheter for HD. Patient was medically stable for step down to the hospital medicine floors.     Patient underwent testing with cosyntropin stimulation test, results are not compatible with adrenal insufficiency, cont hydrocortisone taper. Patient tolerating HD. IR placed RIJ TDC 12/16. Discharge planning to SNF.    12/19- patient was walking to the bathroom and had a mechanical fall with a his foot stumbling and hit his face of the ledge in the bathroom.  Although he walked with a walker and had nursing staff to help however could not be controlled.  Small contusion of the right glabella and the nose bridge.  CT head without any acute fractures.  Also noting abdominal distention.  We will request a paracentesis.  CT abdomen without any concern for hematoma.    12/20- accepted for nursing facility however awaiting for hemodialysis chair.  Given this is a new  admission possible anticipated discharge on Monday per SNF  and requesting attempts to see if systolics can improve > 110.  we will lower his Lopressor if possible.  We will hold his Flomax.     12/21- SNF admission on Monday anticipated.     12/22- Blood pressures have been borderline. HD likely tomorrow.     12/23- Cellulitis of R sided abdomen wall, edges marked. No systemic signs. Continued on rocephin. Chlorhexidine bath ordered.     12/24- Cellulitis appears better,  continue rocephin, HD completed today.     - HD yesterday, anticipating next session on Friday and so he can get his next HD on Monday. Abdominal cellulitis looks better.     Discharge postponed as insurance auth . Planning for discharge .     - patient received HD this morning. Cleared for discharge, SNF approved and handoff given, confirmed with CM. Pressures stable. Patient mentating well.     New cellulitis noted on Right lower extremity, patient is afebrile and has been on antibiotics for abdominal cellulitis, he had received MRSA decolonization and bath few days ago Edges marked, can be re-assessed at SNF and can extend antibiotic duration, Received rocephin today, to receive cefadroxil on Thursday after dialysis.       Review of Systems   Constitutional:  Negative for chills and fever.   HENT:  Negative for nosebleeds.    Respiratory:  Negative for cough.    Cardiovascular:  Positive for leg swelling.   Gastrointestinal:  Positive for abdominal distention. Negative for abdominal pain and diarrhea.   Genitourinary:  Positive for scrotal swelling.   Skin:  Positive for color change and wound. , Right lower extremity mild cellulitis   Neurological:  Negative for dizziness and light-headedness.   Psychiatric/Behavioral:  Negative for confusion.    All other systems reviewed and are negative.    Objective:     Vital Signs (Most Recent):  Temp: 98.3 °F (36.8 °C) (24)  Pulse: 85 (24)  Resp: 18 (24)  BP: (!) 105/52 (24)  SpO2: 100 % (24) Vital Signs (24h Range):      Weight: (!) 140.2 kg (309 lb)  Body mass index is 40.77 kg/m².    No intake or output data in the 24 hours ending 24         Physical Exam  Vitals and nursing note reviewed.   Constitutional:       General: He is awake. He is not in acute distress.     Appearance: He is obese. He is ill-appearing.   HENT:      Head:      Comments: Contusion and small bump around right  glabella.      Mouth/Throat:      Comments: Lower lip injury mostly healed  Eyes:      Extraocular Movements: Extraocular movements intact.      Conjunctiva/sclera: Conjunctivae normal.   Cardiovascular:      Rate and Rhythm: Normal rate.   Pulmonary:      Effort: Pulmonary effort is normal. No respiratory distress.   Abdominal:      General: There is distension.      Palpations: Abdomen is soft.      Tenderness: There is no abdominal tenderness.   Musculoskeletal:         General: No swelling or tenderness.      Right lower leg: Edema present.      Left lower leg: Edema present.      Comments: Erythema of the right lower extreity   Skin:     General: Skin is warm.      Coloration: Skin is not jaundiced.      Findings: Bruising present. Right lower extremity mild cellulitis  Neurological:      Mental Status: He is alert and oriented to person, place, and time.      Motor: No weakness.   Psychiatric:         Behavior: Behavior normal.

## 2024-11-22 NOTE — AI DETERIORATION ALERT
"RAPID RESPONSE NURSE AI ALERT       AI alert received.    Chart Reviewed: 11/22/2024, 12:50 AM    MRN: [id number]  Bed: 8072/8072 A    Dx: Decompensated cirrhosis    Juan Carlos Yoo Sr. has a past medical history of Atrial fibrillation, Bulging disc, Cirrhosis, Colon polyp, EV (esophageal varices), Hypertension, Skin cancer, and Thrombocytopenia.    Last VS: /70   Pulse (!) 125   Temp 97.5 °F (36.4 °C) (Oral)   Resp (!) 21   Ht 6' 1" (1.854 m)   Wt (!) 153 kg (337 lb 4.9 oz)   SpO2 (!) 86%   BMI 44.50 kg/m²     24H Vital Sign Range:  Temp:  [97.3 °F (36.3 °C)-97.9 °F (36.6 °C)]   Pulse:  []   Resp:  [18-21]   BP: ()/(54-70)   SpO2:  [86 %-96 %]     Level of Consciousness (AVPU): alert    Recent Labs     11/20/24  1349 11/20/24  1455 11/21/24  0324 11/21/24 2016   WBC 12.67  --  14.06* 6.21   HGB 6.7*  --  7.0* 6.8*   HCT 21.1* 23* 21.5* 21.0*   PLT 81*  --  66* 67*       Recent Labs     11/20/24  2210 11/21/24  0324 11/21/24 2016   * 130* 130*   K 5.1 5.6* 5.0   CL 96 96 95   CO2 18* 18* 22*   BUN 48* 48* 52*   CREATININE 5.9* 5.9* 6.3*    98 114*   PHOS  --  5.9*  --    MG  --  2.4  --         Recent Labs     11/20/24  1542   PH 7.412   PCO2 34.7*   PO2 40   HCO3 22.1*   POCSATURATED 76   BE -2        OXYGEN: room air             MEWS score: 3    Charge Arcelia SAMANO contacted for documented , SpO2 86% reports will go recheck VS.     Patient has 1u PRBC ordered, has been difficult to obtain PIV access. Access just recently obtained, will be getting transfused shortly.    No additional concerns verbalized at this time. Instructed to call [phone] for further concerns or assistance.    KEISHA Boyd RN        "

## 2024-11-22 NOTE — H&P
[facility]  Critical Care Medicine  History & Physical    Patient Name: Juan Carlos Yoo Sr.  MRN: [id number]  Admission Date: 11/20/2024  Hospital Length of Stay: 2 days  Code Status: Full Code  Attending Physician: Glory Oliva MD   Primary Care Provider: Nyla Rios FNP   Principal Problem: Decompensated cirrhosis    Subjective:     HPI:  Juan Carlos Yoo is a 69 male with PMH of alcoholic cirrhosis, esophageal varices, Afib on eliquis, and HTN who presents for c/o worsening diffuse swelling as well as R arm pain/erythema. He was recently hospitalized 1 month ago at [facility] for volume overload in the setting of decompensated cirrhosis. He was treated with IV diuresis and discharged with adjustment to his diuretics, currently on lasix 60mg BID and metolazone 2.5mg every other week as per family. Patient reports diffuse swelling of his upper and lower extremities in addition to distended abdomen and worsening dyspnea, which he believes is due to recent medication adjustment. Family states he is non-compliant with low sodium diet and fluid restriction. Family states he has been compliant with diuretics, but rom't taken eliquis for 3 days due to issue getting refill. He also reports scratching right arm on door frame 3-4 days ago which has become red and painful after wrapping in in bandage. He claims to be compliant with medications, but is a poor historian. He follows with hepatology, not currently active on transplant list. He states he hasn't consumed alcohol for at least 9 months. Has c/o chills, but denies fever, cough, nausea, vomiting, chest pain, dysuria, hematuria, blood in stool. Workup in ED remarkable for VS: T 97.6 HR 94 RR 22 BP 95/56 O2 sat 97% on RA. Hb 6.7, MCV 75, Plt 81, PT/INR 22.6/2.2, Na 128, K 6.1, BUN/Cr 49/5.7, Alb 2.8, AST/ALT 35/9, Ammonia 104, , Trop neg, LA 2.9, CXR: Cardiac size is enlarged similar to prior.  No large volume of pleural fluid noted although there  is mild blunting of the right costophrenic angle which may relate to a small amount of pleural fluid.  Suspected right basilar opacity, to be correlated clinically for infection. RUE venous doppler: No thrombus in central veins of the right upper extremity. Patient received vanc/zosyn and lasix 80mg IVP in ED and will be admitted to hospital medicine service for further management.     Pt was admitted to hospital medicine. Blood cultures positive for Gram-positive cocci and Gram-negative rods. ID has been consulted. S/p 2 units PRBC for Hemoglobin dropped 6.8 on 11/21. Pt was on Eliquis for atrial fibrillation prior to admission which was held.  Hepatology following patient's clinical course, but are not evaluating him for transplant at this time. Diuretics were held because of concern for HRS.  Patient was started on albumin and midodrine per Nephrology recommendations for HRS.  Renal function continued to worsen and recommendation per Nephrology to transfer to ICU for maintaining goal map over 85 on Levophed.  ICU was notified and patient to be assessed to be transferred to ICU.       Hospital/ICU Course:  No notes on file     Past Medical History:   Diagnosis Date    Atrial fibrillation     Bulging disc     Cirrhosis     Colon polyp 12/29/2017    Rpt 5 yrs    EV (esophageal varices)     Hypertension 3/30/2023    Skin cancer 03/2017    Thrombocytopenia        Past Surgical History:   Procedure Laterality Date    CATHETERIZATION OF BOTH LEFT AND RIGHT HEART N/A 2/8/2023    Procedure: CATHETERIZATION, HEART, BOTH LEFT AND RIGHT;  Surgeon: Flo Malone MD;  Location: [facility] CATH LAB;  Service: Cardiology;  Laterality: N/A;  low bleeding risk 1.0%    CHOLECYSTECTOMY      COLONOSCOPY      COLONOSCOPY N/A 12/29/2017    ta rpt 2022    CORONARY ANGIOGRAPHY N/A 2/8/2023    Procedure: ANGIOGRAM, CORONARY ARTERY;  Surgeon: Flo Malone MD;  Location: [facility] CATH LAB;  Service: Cardiology;  Laterality: N/A;    HERNIA  REPAIR      SKIN BIOPSY  03/2017    TONSILLECTOMY      UPPER GASTROINTESTINAL ENDOSCOPY  2011    EV    UPPER GASTROINTESTINAL ENDOSCOPY  2016    VASECTOMY         Review of patient's allergies indicates:  No Known Allergies    Family History       Problem Relation (Age of Onset)    Cancer Maternal Uncle    Heart disease Maternal Uncle    Hyperlipidemia Brother    Ovarian cancer Sister          Tobacco Use    Smoking status: Never    Smokeless tobacco: Never   Substance and Sexual Activity    Alcohol use: Not Currently     Alcohol/week: 0.0 standard drinks of alcohol    Drug use: No    Sexual activity: Not on file      Review of Systems   Constitutional:  Positive for fatigue. Negative for chills and fever.   HENT:  Negative for congestion.    Respiratory:  Negative for cough, shortness of breath and wheezing.    Cardiovascular:  Positive for leg swelling.   Gastrointestinal:  Positive for abdominal distention. Negative for abdominal pain, diarrhea and nausea.   Genitourinary:  Positive for scrotal swelling. Negative for dysuria and frequency.   Musculoskeletal:  Negative for arthralgias, joint swelling and myalgias.   Skin:  Positive for rash and wound.   Neurological:  Negative for dizziness and light-headedness.   Psychiatric/Behavioral:  Negative for confusion.      Objective:     Vital Signs (Most Recent):  Temp: 97.4 °F (36.3 °C) (11/22/24 1100)  Pulse: 104 (11/22/24 1121)  Resp: 20 (11/22/24 1100)  BP: 110/72 (11/22/24 1121)  SpO2: (!) 94 % (11/22/24 1121) Vital Signs (24h Range):  Temp:  [97.3 °F (36.3 °C)-97.8 °F (36.6 °C)] 97.4 °F (36.3 °C)  Pulse:  [] 104  Resp:  [18-21] 20  SpO2:  [86 %-96 %] 94 %  BP: (100-114)/(52-72) 110/72   Weight: (!) 148 kg (326 lb 4.5 oz)  Body mass index is 43.05 kg/m².      Intake/Output Summary (Last 24 hours) at 11/22/2024 1346  Last data filed at 11/22/2024 1016  Gross per 24 hour   Intake 283 ml   Output 900 ml   Net -617 ml          Physical Exam  Constitutional:        General: He is not in acute distress.     Appearance: He is obese. He is ill-appearing (chronic). He is not toxic-appearing.   HENT:      Head: Normocephalic and atraumatic.   Eyes:      General: Scleral icterus present.      Extraocular Movements: Extraocular movements intact.      Conjunctiva/sclera: Conjunctivae normal.   Cardiovascular:      Rate and Rhythm: Normal rate. Rhythm irregular.      Heart sounds: Normal heart sounds.   Pulmonary:      Effort: Pulmonary effort is normal. No respiratory distress.      Breath sounds: No wheezing.   Abdominal:      General: Bowel sounds are normal. There is distension.      Palpations: Abdomen is soft.      Tenderness: There is no abdominal tenderness.      Hernia: A hernia (umbilical and L inguinal hernia) is present.   Musculoskeletal:         General: Normal range of motion.      Right upper arm: Swelling and edema present.      Left upper arm: Swelling and edema present.      Cervical back: Normal range of motion and neck supple.      Right lower leg: Edema present.      Left lower leg: Edema present.   Skin:     General: Skin is warm and dry.      Coloration: Skin is not jaundiced.      Findings: Erythema (RUE) present.   Neurological:      General: No focal deficit present.      Mental Status: He is alert and oriented to person, place, and time. He is confused.      Comments: Asterixis   Psychiatric:         Attention and Perception: He is inattentive.         Behavior: Behavior normal.            Vents:     Lines/Drains/Airways       Drain  Duration                  Urethral Catheter 11/20/24 1802 Double-lumen 1 day              Peripheral Intravenous Line  Duration                  Peripheral IV - Single Lumen 11/21/24 2300 20 G Right Breast <1 day         Peripheral IV - Single Lumen 11/22/24 1159 22 G Anterior;Right Upper Arm <1 day                  Significant Labs:    CBC/Anemia Profile:  Recent Labs   Lab 11/20/24  2210 11/21/24  0324 11/21/24 2016  11/22/24 0449 11/22/24  1041   WBC  --    < > 6.21 15.71* 8.52   HGB  --    < > 6.8* 7.2* 7.2*   HCT  --    < > 21.0* 21.5* 22.6*   PLT  --    < > 67* 72* 69*   MCV  --    < > 74* 76* 80*   RDW  --    < > 20.1* 20.5* 21.4*   IRON 25*  --   --   --   --    FERRITIN 36  --   --   --   --    RETIC  --   --   --  2.4*  --    FOLATE 6.1  --   --   --   --    [id number] >2000*  --   --   --   --     < > = values in this interval not displayed.        Chemistries:  Recent Labs   Lab 11/20/24 1349 11/20/24 2210 11/21/24 0324 11/21/24 2016 11/22/24 0449   *   < > 130* 130* 132*   K 6.1*   < > 5.6* 5.0 4.8   CL 96   < > 96 95 96   CO2 20*   < > 18* 22* 22*   BUN 49*   < > 48* 52* 51*   CREATININE 5.7*   < > 5.9* 6.3* 6.4*   CALCIUM 9.6   < > 9.8 9.9 10.1   ALBUMIN 2.8*  --  3.2*  --  3.9   PROT 7.6  --  7.5  --  7.6   BILITOT 4.8*  --  7.4*  --  6.8*   ALKPHOS 47  --  45  --  47   ALT 9*  --  9*  --  6*   AST 35  --  27  --  8*   MG  --   --  2.4  --  2.6   PHOS  --   --  5.9*  --  6.1*    < > = values in this interval not displayed.       CMP:   Recent Labs   Lab 11/20/24 1349 11/20/24 2210 11/21/24 0324 11/21/24 2016 11/22/24 0449   *   < > 130* 130* 132*   K 6.1*   < > 5.6* 5.0 4.8   CL 96   < > 96 95 96   CO2 20*   < > 18* 22* 22*   GLU 98   < > 98 114* 95   BUN 49*   < > 48* 52* 51*   CREATININE 5.7*   < > 5.9* 6.3* 6.4*   CALCIUM 9.6   < > 9.8 9.9 10.1   PROT 7.6  --  7.5  --  7.6   ALBUMIN 2.8*  --  3.2*  --  3.9   BILITOT 4.8*  --  7.4*  --  6.8*   ALKPHOS 47  --  45  --  47   AST 35  --  27  --  8*   ALT 9*  --  9*  --  6*   ANIONGAP 12   < > 16 13 14    < > = values in this interval not displayed.       Significant Imaging: I have reviewed all pertinent imaging results/findings within the past 24 hours.  Assessment/Plan:     Neuro  Encephalopathy, metabolic  AAOx4  Will assess for signs of encephalopathy     Cardiac/Vascular  Atrial fibrillation  Holding home Eliquis in the setting of bleeding  "and low Hgb    Renal/  Stage 3a chronic kidney disease  --Nephrology following  --Strict intake and output  --Renally dose all medications  --Avoid nephrotoxic agents    JAE (acute kidney injury)  Baseline creatinine is 1.5    Cr trend 6.3 > 6.4     Plan  - JAE is worsening. Will continue current treatment  - Avoid nephrotoxins and renally dose meds for GFR listed above  - Monitor urine output, serial BMP, and adjust therapy as needed     - Etiology includes volume overload, obstruction, hypotension, possible HRS  - monitor peacock catheter and monitor strict I&O  - was started on midodrine and albumin.  Octreotide started 11/22/2024.  -appreciate nephrology recommendations and recommendation to give 1 dose of IV Lasix 120 mg.   - ICU consulted for Levo administration with MAP goal 70-75. Peripheral Levophed ordered and will titrate as needed.       ID  Severe sepsis  This patient does have evidence of infective focus  My overall impression is sepsis.  Source: Abdominal  Antibiotics given-   Antibiotics (72h ago, onward)      Start     Stop Route Frequency Ordered    11/21/24 2100  mupirocin 2 % ointment         11/26/24 2059 Nasl 2 times daily 11/21/24 1329    11/20/24 2100  cefTRIAXone injection 2 g         -- IV Every 24 hours (non-standard times) 11/20/24 1848    11/20/24 1722  vancomycin - pharmacy to dose  (vancomycin IVPB (PEDS and ADULTS))        Placed in "And" Linked Group    -- IV pharmacy to manage frequency 11/20/24 1623          Latest lactate reviewed-  Recent Labs   Lab 11/20/24  1935 11/20/24  2210 11/22/24  0903   LACTATE  --    < > 1.4   POCLAC 3.42*  --   --     < > = values in this interval not displayed.     Organ dysfunction indicated by Acute kidney injury    Fluid challenge Contraindicated- Fluid bolus is contraindicated in this patient due to End Stage Liver Disease     Post- resuscitation assessment No - Post resuscitation assessment not needed       Will Not start Pressors- Levophed for " MAP of 65  Source control achieved by: antibiotics    Hematology  Thrombocytopenia  --Daily CBC  --Transfuse for PLT<10k, or PLT<50K with active bleeding  --Monitor for signs and symptoms of bleeding    Coagulopathy  End Stage Liver Disease precipitated coagulopathy    Will hold all anticoagulation during admission    Endocrine  Hyponatremia  Likely dilutional secondary to end stage liver disease and HRS    Will monitor for S&S of hyponatremia and correct when clinically appropriate.     GI  * Decompensated cirrhosis  MELD 3.0: 35 at 11/22/2024  4:49 AM  MELD-Na: 35 at 11/22/2024  4:49 AM  Calculated from:  Serum Creatinine: 6.4 mg/dL (Using max of 3 mg/dL) at 11/22/2024  4:49 AM  Serum Sodium: 132 mmol/L at 11/22/2024  4:49 AM  Total Bilirubin: 6.8 mg/dL at 11/22/2024  4:49 AM  Serum Albumin: 3.9 g/dL (Using max of 3.5 g/dL) at 11/22/2024  4:49 AM  INR(ratio): 1.9 at 11/22/2024  4:49 AM  Age at listing (hypothetical): 69 years  Sex: Male at 11/22/2024  4:49 AM    --Hepatology following, appreciat recs  --Continue lactulose, rifaximin          Critical Care Daily Checklist:    A: Awake: RASS Goal/Actual Goal:    Actual:     B: Spontaneous Breathing Trial Performed?     C: SAT & SBT Coordinated?  N/A                      D: Delirium: CAM-ICU     E: Early Mobility Performed? No   F: Feeding Goal:    Status:     Current Diet Order   Procedures    Diet Renal Fluid - 1500mL     Order Specific Question:   Fluid restriction:     Answer:   Fluid - 1500mL      AS: Analgesia/Sedation None    T: Thromboembolic Prophylaxis Not indicated due to coagulopathy   H: HOB > 300 Yes   U: Stress Ulcer Prophylaxis (if needed) N/A   G: Glucose Control stable   B: Bowel Function Stool Occurrence: 1   I: Indwelling Catheter (Lines & Boo) Necessity Boo catheter, peripheral iv   D: De-escalation of Antimicrobials/Pharmacotherapies Not indicated at this time    Plan for the day/ETD Initiate levophed    Code Status:  Family/Goals of Care:  Full Code       Critical secondary to Patient has a condition that poses threat to life and bodily function: Acute Kidney Injury - suspecting HRS    Critical care was time spent personally by me on the following activities: development of treatment plan with patient or surrogate and bedside caregivers, discussions with consultants, evaluation of patient's response to treatment, examination of patient, ordering and performing treatments and interventions, ordering and review of laboratory studies, ordering and review of radiographic studies, pulse oximetry, re-evaluation of patient's condition. This critical care time did not overlap with that of any other provider or involve time for any procedures.     Jasen Wong MD  Critical Care Medicine  [facility] - Tele[facility] Step[facility]

## 2024-11-22 NOTE — PT/OT/SLP PROGRESS
Occupational Therapy   Treatment    Name: Juan Carlos Yoo Sr.  MRN: [id number]  Admitting Diagnosis:  Decompensated cirrhosis       Recommendations:     Discharge Recommendations: Moderate Intensity Therapy  Discharge Equipment Recommendations:  none  Barriers to discharge:  Decreased caregiver support    Assessment:     Juan Carlos Yoo Sr. is a 69 y.o. male with a medical diagnosis of Decompensated cirrhosis.  He presents with limited session due to pt declined EOB/OOB activities. Performance deficits affecting function are weakness, impaired endurance, impaired self care skills, impaired functional mobility, impaired balance, decreased safety awareness, decreased lower extremity function, decreased upper extremity function, edema, impaired skin.     Rehab Prognosis:  Fair; patient would benefit from acute skilled OT services to address these deficits and reach maximum level of function.       Plan:     Patient to be seen 4 x/week to address the above listed problems via self-care/home management, therapeutic activities, therapeutic exercises, neuromuscular re-education  Plan of Care Expires: 12/22/24  Plan of Care Reviewed with: patient    Subjective     Chief Complaint: none stated  Patient/Family Comments/goals: Pt reported that he did not want to get EOB/OOB at time of session as wanted to wait until he has to actively have BM.  Pain/Comfort:  Pain Rating 1: 0/10  Pain Rating Post-Intervention 1: 0/10    Objective:     Communicated with: RN prior to session.  Patient found supine with peacock catheter, telemetry, peripheral IV (no family present) upon OT entry to room.    General Precautions: Standard, fall, special contact    Orthopedic Precautions:N/A  Braces: N/A     Occupational Performance:       [facility] 6 Click ADL: 17    Treatment & Education:  Pt declined all EOB/OOB activities on this date despite education due to wanting to wait until he has to actively have a BM.  Provided encouragement for EOB pt declined.  Pt  agreeable to BUE therex. Pt performed AROM exercises with BUE in 4 planes x 10 reps each x 2 sets each while in bed with HOB elevated. Provided education on therex to facilitate decreased edema.  Pt had no further questions & when asked whether there were any concerns pt reported none.      Patient left supine with all lines intact, call button in reach, bed alarm on, and RN notified    GOALS:   Multidisciplinary Problems       Occupational Therapy Goals          Problem: Occupational Therapy    Goal Priority Disciplines Outcome Interventions   Occupational Therapy Goal     OT, PT/OT Progressing    Description: Goals to be met by: 11-30-24     Patient will increase functional independence with ADLs by performing:    UE Dressing with Set-up Assistance.  LE Dressing with Stand-by Assistance.  Grooming while standing at sink with Stand-by Assistance.  Toileting from toilet with Stand-by Assistance for hygiene and clothing management.   Step transfer: with SBA and use of LRAD  Independent with HEP to BUE to improve ROM                         Time Tracking:     OT Date of Treatment: 11/22/24  OT Start Time: 1057  OT Stop Time: 1117  OT Total Time (min): 20 min    Billable Minutes:Therapeutic Exercise 20    OT/EDWARDO: OT          11/22/2024

## 2024-11-22 NOTE — ASSESSMENT & PLAN NOTE
Hyponatremia is likely due to Cirrhosis. The patient's most recent sodium results are listed below.  Recent Labs     11/20/24  1349 11/20/24  2210 11/21/24  0324   * 131* 130*     Plan  - Correct the sodium by 4-6mEq in 24 hours.   - Appreciate nephrology recommendations  - Monitor sodium Every 12 hours.   - Patient hyponatremia is stable

## 2024-11-22 NOTE — CONSULTS
[facility] - Tele[facility] Stepdown  Wound Care    Patient Name:  Juan Carlos Yoo Sr.   MRN:  [id number]  Date: 11/22/2024  Diagnosis: Decompensated cirrhosis    History:     Past Medical History:   Diagnosis Date    Atrial fibrillation     Bulging disc     Cirrhosis     Colon polyp 12/29/2017    Rpt 5 yrs    EV (esophageal varices)     Hypertension 3/30/2023    Skin cancer 03/2017    Thrombocytopenia        Social History     Socioeconomic History    Marital status: Single   Occupational History     Employer: [employer]   Tobacco Use    Smoking status: Never    Smokeless tobacco: Never   Substance and Sexual Activity    Alcohol use: Not Currently     Alcohol/week: 0.0 standard drinks of alcohol    Drug use: No     Social Drivers of Health     Financial Resource Strain: Low Risk  (11/21/2024)    Overall Financial Resource Strain (CARDIA)     Difficulty of Paying Living Expenses: Not very hard   Food Insecurity: No Food Insecurity (11/21/2024)    Hunger Vital Sign     Worried About Running Out of Food in the Last Year: Never true     Ran Out of Food in the Last Year: Never true   Transportation Needs: No Transportation Needs (11/21/2024)    TRANSPORTATION NEEDS     Transportation : No   Physical Activity: Inactive (11/21/2024)    Exercise Vital Sign     Days of Exercise per Week: 0 days     Minutes of Exercise per Session: 0 min   Stress: Stress Concern Present (11/21/2024)    [place] [employer] - Occupational Stress Questionnaire     Feeling of Stress : To some extent   Housing Stability: Low Risk  (11/21/2024)    Housing Stability Vital Sign     Unable to Pay for Housing in the Last Year: No     Homeless in the Last Year: No       Precautions:     Allergies as of 11/20/2024    (No Known Allergies)       WOC Assessment Details/Treatment     Wound care consult received for R arm.  Chart reviewed for this encounter.  See flowsheets for findings.    Pt found lying in bed, agreeable to care at this  time.  Pt states he scratched his R FA on a doorframe prior to [place] [facility] On assessment, pt w/ small skin tear that appears to be scabbed over. Pt does have weeping and generalized edema from BUE and states he dislikes having the dressings in place as they further irritate his skin. Recommended keeping arms EDWARDO and changing chucks pads as needed for soilage/saturation to prevent further breakdown d/t drainage remaining trapped on skin. Pt in agreement w/ plan.    Daily/PRN - BL arms - cleanse gently w/ NS, pat dry and apply Cavilon barrier spray to arms. Place xeroform or petrolatum gauze on any open skin tears and place arms on chucks pads. Change xeroform daily and chucks pads PRN for soilage/saturation.     11/22/24 1205   WOCN Assessment   WOCN Total Time (mins) 15   Visit Date 11/22/24   Visit Time 1205   Consult Type New   WOCN Speciality Wound   Intervention assessed;changed;applied;chart review;coordination of care;orders   Teaching on-going        Wound 11/20/24 1525 Skin Tear Right Arm   Date First Assessed/Time First Assessed: 11/20/24 1525   Present on Original Admission: Yes  Primary Wound Type: Skin Tear  Side: Right  Location: Arm   Wound Image    Dressing Appearance Open to air

## 2024-11-22 NOTE — ASSESSMENT & PLAN NOTE
JAE is likely due to pre-renal azotemia due to intravascular volume depletion likely secondary to decompensated hepatic cirrhosis with volume overload. Hx of CKD3a with baseline creatinine is  1.2-1.4 . Most recent creatinine and eGFR are listed below.  Recent Labs     11/21/24  0324 11/21/24 2016 11/22/24  0449   CREATININE 5.9* 6.3* 6.4*   EGFRNORACEVR 9.6* 8.8* 8.7*       - JAE is worsening. Will make recommendations as follows:  - Avoid nephrotoxins and renally dose meds for GFR listed above  - Monitor urine output, serial BMP, and adjust therapy as needed  - Strict I's/O's, daily weights  - Boo catheter in place  - Urine lytes and microscopy concerning for hepatorenal syndrome  - Recommend CCM consult for initiation of vasopressors to maintain MAP goal as treatment for hepatorenal syndrome  - Agree with albumin, midodrine, octreotide  - Diurese today with furosemide 120 mg IV x1, monitor urinary output closely  - Treat other potential contributing factors such as sepsis 2/2 SSTI, anemia with hemoglobin < 7.0, hemodynamic instability avoiding relative hypotension  - Maintain MAP > 80-85  - Retroperitoneal ultrasound c/w bilateral CKD, showed questionable 6 mm nonobstructing stone in the left upper pole without evidence of hydronephrosis or solid renal mass.

## 2024-11-22 NOTE — PROGRESS NOTES
[facility] - Telemetry [facility] Medicine  Progress Note    Patient Name: Juan Carlos Yoo Sr.  MRN: [id number]  Patient Class: IP- Inpatient   Admission Date: 11/20/2024  Length of Stay: 2 days  Attending Physician: Glory Oliva MD  Primary Care Provider: Nyla Rios FNP        Subjective:     Principal Problem:Decompensated cirrhosis        HPI:  69M with PMH of alcoholic cirrhosis, esophageal varices, Afib on eliquis, and HTN who presents for c/o worsening diffuse swelling as well as R arm pain/erythema. He was recently hospitalized 1 month ago at [facility] for volume overload in the setting of decompensated cirrhosis. He was treated with IV diuresis and discharged with adjustment to his diuretics, currently on lasix 60mg BID and metolazone 2.5mg every other week as per family. Patient reports diffuse swelling of his upper and lower extremities in addition to distended abdomen and worsening dyspnea, which he believes is due to recent medication adjustment. Family states he is non-compliant with low sodium diet and fluid restriction. Family states he has been compliant with diuretics, but rom't taken eliquis for 3 days due to issue getting refill. He also reports scratching right arm on door frame 3-4 days ago which has become red and painful after wrapping in in bandage. He claims to be compliant with medications, but is a poor historian. He follows with hepatology, not currently active on transplant list. He states he hasn't consumed alcohol for at least 9 months. Has c/o chills, but denies fever, cough, nausea, vomiting, chest pain, dysuria, hematuria, blood in stool. Workup in ED remarkable for VS: T 97.6 HR 94 RR 22 BP 95/56 O2 sat 97% on RA. Hb 6.7, MCV 75, Plt 81, PT/INR 22.6/2.2, Na 128, K 6.1, BUN/Cr 49/5.7, Alb 2.8, AST/ALT 35/9, Ammonia 104, , Trop neg, LA 2.9, CXR: Cardiac size is enlarged similar to prior.  No large volume of pleural fluid noted although there is mild blunting of the  right costophrenic angle which may relate to a small amount of pleural fluid.  Suspected right basilar opacity, to be correlated clinically for infection. RUE venous doppler: No thrombus in central veins of the right upper extremity. Patient received vanc/zosyn and lasix 80mg IVP in ED and will be admitted to hospital medicine service for further management.    Overview/Hospital Course:  69 y.o. male with history of EtOH use disorder, EtOH cirrhosis, HCC s/p y90, morbid obesity BMI 44, HTN, and Afib who presents with severe anasarca and JAE.     Appreciate hepatology and nephrology recommendations.  Diuretics were held because of concern for HRS.  Patient was started on albumin and midodrine per Nephrology recommendations for HRS.  Renal function continued to worsen and recommendation per Nephrology to transfer to ICU for maintaining goal map over 85 on Levophed.  ICU was notified and patient to be assessed to be transferred to ICU.    Patient also has Gram-positive cocci and Gram-negative rods in his blood now.  Possible sources could be got translocation and barrier related infection through skin as he has been significantly edematous and has been oozing.  Has been on vancomycin and Rocephin.  Id was consulted this morning.  Repeat blood cultures were obtained.    Continues to have anemia.  Hemoglobin dropped on 11/20 and was given 1 unit of packed red blood cells.  Did not respond appropriately.  Hemoglobin dropped again to 6.8 on 11/21 and was given 1 unit of packed red blood cells.  Hemoglobin again on 11/22 is 7.2.  No reported melena or hematochezia.  Patient was on Eliquis for atrial fibrillation prior to admission which was held.  Given history of esophageal varices and portal hypertensive gastropathy whereas also could be component of slow bleeding, under production due to CKD and hemolysis while also with decompensated cirrhosis.  Started on Protonix IV b.i.d. and octreotide.    Also clarified with  hepatology.  Given patient's current infection we will await ID recommendations however we will be challenging to consider transplant evaluation at this time.  Trend clinical course    Goal clarification with the patient and family.  Patient at this time wants to be full code and continue with Levophed in ICU.  They would consider discussion with palliative care in a day or so if things do not get better.        Interval History:     NAEON.    Urine output of 400 mL from morning shift to noon.  One reported bowel movement last night.  No bowel movements this morning.    Contacted ICU for Levophed to maintain maps more than 85.    Appreciate Nephrology and hepatology recommendations.    Review of Systems   Constitutional:  Positive for fatigue. Negative for chills and fever.   HENT:  Negative for congestion.    Respiratory:  Negative for cough, shortness of breath and wheezing.    Cardiovascular:  Positive for leg swelling.   Gastrointestinal:  Positive for abdominal distention. Negative for abdominal pain, diarrhea and nausea.   Genitourinary:  Positive for scrotal swelling. Negative for dysuria and frequency.   Musculoskeletal:  Negative for arthralgias, joint swelling and myalgias.   Skin:  Positive for rash and wound.   Neurological:  Negative for dizziness and light-headedness.   Psychiatric/Behavioral:  Positive for confusion.      Objective:     Vital Signs (Most Recent):  Temp: 97.4 °F (36.3 °C) (11/22/24 1100)  Pulse: 104 (11/22/24 1121)  Resp: 20 (11/22/24 1100)  BP: 110/72 (11/22/24 1121)  SpO2: (!) 94 % (11/22/24 1121) Vital Signs (24h Range):  Temp:  [97.3 °F (36.3 °C)-97.8 °F (36.6 °C)] 97.4 °F (36.3 °C)  Pulse:  [] 104  Resp:  [18-21] 20  SpO2:  [86 %-96 %] 94 %  BP: (100-114)/(52-72) 110/72     Weight: (!) 148 kg (326 lb 4.5 oz)  Body mass index is 43.05 kg/m².     Physical Exam  Constitutional:       General: He is not in acute distress.     Appearance: He is obese. He is ill-appearing  (chronic). He is not toxic-appearing.   HENT:      Head: Normocephalic and atraumatic.   Eyes:      General: Scleral icterus present.      Extraocular Movements: Extraocular movements intact.      Conjunctiva/sclera: Conjunctivae normal.   Cardiovascular:      Rate and Rhythm: Normal rate. Rhythm irregular.      Heart sounds: Normal heart sounds.   Pulmonary:      Effort: Pulmonary effort is normal. No respiratory distress.      Breath sounds: No wheezing.   Abdominal:      General: Bowel sounds are normal. There is distension.      Palpations: Abdomen is soft.      Tenderness: There is no abdominal tenderness.      Hernia: A hernia (umbilical and L inguinal hernia) is present.   Musculoskeletal:         General: Normal range of motion.      Right upper arm: Swelling and edema present.      Left upper arm: Swelling and edema present.      Cervical back: Normal range of motion and neck supple.      Right lower leg: Edema present.      Left lower leg: Edema present.   Skin:     General: Skin is warm and dry.      Coloration: Skin is not jaundiced.      Findings: Erythema (RUE) present.   Neurological:      General: No focal deficit present.      Mental Status: He is alert and oriented to person, place, and time. He is confused.      Comments: Asterix[employer]   Psychiatric:         Attention and Perception: He is inattentive.         Behavior: Behavior normal.              Significant Labs: All pertinent labs within the past 24 hours have been reviewed.    Recent Labs   Lab 11/22/24  1041   WBC 8.52   RBC 2.83*   HGB 7.2*   HCT 22.6*   PLT 69*   MCV 80*   MCH 25.4*   MCHC 31.9*     Recent Labs   Lab 11/22/24  0449   CALCIUM 10.1   ALBUMIN 3.9   PROT 7.6   *   K 4.8   CO2 22*   CL 96   BUN 51*   CREATININE 6.4*   ALKPHOS 47   ALT 6*   AST 8*   BILITOT 6.8*   \  Significant Imaging: I have reviewed all pertinent imaging results/findings within the past 24 hours.    Assessment/Plan:      * Decompensated cirrhosis  Volume  Overload  Etoh Cirrhosis  Hx of HCC s/p Y90    MELD 3.0: 35 at 11/22/2024  4:49 AM  MELD-Na: 35 at 11/22/2024  4:49 AM  Calculated from:  Serum Creatinine: 6.4 mg/dL (Using max of 3 mg/dL) at 11/22/2024  4:49 AM  Serum Sodium: 132 mmol/L at 11/22/2024  4:49 AM  Total Bilirubin: 6.8 mg/dL at 11/22/2024  4:49 AM  Serum Albumin: 3.9 g/dL (Using max of 3.5 g/dL) at 11/22/2024  4:49 AM  INR(ratio): 1.9 at 11/22/2024  4:49 AM  Age at listing (hypothetical): 69 years  Sex: Male at 11/22/2024  4:49 AM     Progressively worsening anasarca    Received lasix 80mg IVP in ED, diuretics were held.    We will give 120 mg of IV Lasix per Nephrology 11/22/2024.  Also requested ICU for Levophed to maintain map over 85.  ICU consulted    --Nephrology consulted for HRS. continue albumin and midodrine.  Octreotide added 11/22/2024  -- blood cultures positive for Gram-positive cocci and Gram-negative rods.  Continue vanc and Rocephin.  Id consulted.  -- IR assessed 11/21 however no pocket for safe paracentesis  -- PETH and serological workup checked.  -- Appreciate hepatology recommendations      Encephalopathy, metabolic  2/2 decompensated cirrhosis      Hyponatremia  Hyponatremia is likely due to Cirrhosis. The patient's most recent sodium results are listed below.  Recent Labs     11/20/24  1349 11/20/24  2210 11/21/24  0324   * 131* 130*     Plan  - Correct the sodium by 4-6mEq in 24 hours.   - Appreciate nephrology recommendations  - Monitor sodium Every 12 hours.   - Patient hyponatremia is stable      Thrombocytopenia  The likely etiology of thrombocytopenia is liver disease. The patients 3 most recent labs are listed below.  Recent Labs     11/21/24  2016 11/22/24  0449 11/22/24  1041   PLT 67* 72* 69*       Plan  - Will transfuse if platelet count is <50k (if undergoing surgical procedure or have active bleeding).      Microcytic anemia  Anemia is likely due to chronic disease due to Chronic liver disease. Most recent  "hemoglobin and hematocrit are listed below.  Recent Labs     11/21/24  2016 11/22/24  0449 11/22/24  1041   HGB 6.8* 7.2* 7.2*   HCT 21.0* 21.5* 22.6*       Plan  - Monitor serial CBC: Daily  - Transfuse PRBC if patient becomes hemodynamically unstable, symptomatic or H/H drops below 7/21.  - Patient has received 1 units of PRBCs on 11/20 and 1 PRBC on 11/21  - Patient's anemia is currently stable  - Hb baseline 7-8. No obvious bleeding  - Eliquis held on admission.  DVT prophylaxis held.      Stage 3a chronic kidney disease  Creatine stable for now. BMP reviewed- noted Estimated Creatinine Clearance: 16 mL/min (A) (based on SCr of 6.4 mg/dL (H)). according to latest data. Based on current GFR, CKD stage is stage 3 - GFR 30-59.  Monitor UOP and serial BMP and adjust therapy as needed. Renally dose meds. Avoid nephrotoxic medications and procedures.    See above    Severe sepsis  This patient does have evidence of infective focus  My overall impression is sepsis.  Source: Skin and Soft Tissue (location RUE), blood cultures with Gram-positive cocci and Gram-negative rods   Antibiotics given-   Antibiotics (72h ago, onward)      Start     Stop Route Frequency Ordered    11/21/24 2100  mupirocin 2 % ointment         11/26/24 2059 Nasl 2 times daily 11/21/24 1329    11/20/24 2100  cefTRIAXone injection 2 g         -- IV Every 24 hours (non-standard times) 11/20/24 1848    11/20/24 1722  vancomycin - pharmacy to dose  (vancomycin IVPB (PEDS and ADULTS))        Placed in "And" Linked Group    -- IV pharmacy to manage frequency 11/20/24 1623          Latest lactate reviewed-  Recent Labs   Lab 11/20/24  1935 11/20/24  2210 11/22/24  0903   LACTATE  --    < > 1.4   POCLAC 3.42*  --   --     < > = values in this interval not displayed.       Organ dysfunction indicated by Acute kidney injury    Source control achieved by: IV abx, cont vanc/ceftriaxone   RUE erythema/tenderness - cellulitis   No pocket for safe paracentesis " and hence cannot rule out SB, repeat blood cultures obtained 11/22   Infectious workup in process  Infectious disease consulted      JAE (acute kidney injury)  Baseline creatinine is  1.5 . Most recent creatinine and eGFR are listed below.    Recent Labs     11/21/24 0324 11/21/24 2016 11/22/24  0449   CREATININE 5.9* 6.3* 6.4*   EGFRNORACEVR 9.6* 8.8* 8.7*        Plan  - JAE is worsening. Will continue current treatment  - Avoid nephrotoxins and renally dose meds for GFR listed above  - Monitor urine output, serial BMP, and adjust therapy as needed    - Etiology includes volume overload, obstruction, hypotension, possible HRS  - monitor peacock catheter and monitor strict I&O  - was started on midodrine and albumin.  Octreotide started 11/22/2024.  -appreciate nephrology recommendations and recommendation to give 1 dose of IV Lasix 120 mg.  ICU consultation requested for Levophed for goal map of more than 85.    Atrial fibrillation  Patient has persistent (7 days or more) atrial fibrillation. Patient is currently in atrial fibrillation. [id number] Score: 1. The patients heart rate in the last 24 hours is as follows:  Pulse  Min: 73  Max: 127       Plan  - Patient's afib is currently controlled  - Hold eliquis for anemia  - Hold BB for hypotension    Coagulopathy  2/2 cirrhosis. See plan below    Recent Labs   Lab 11/22/24 0449   INR 1.9*   APTT 38.3*     We will give 3 doses of IV vitamin K.        Goals of care, counseling/discussion  Advance Care Planning    Code Status  In light of the patients advanced and life limiting illness,I engaged the the patient and family in a voluntary conversation about the patient's preferences for care  at the very end of life. The patient wishes to have a natural, peaceful death.  Along those lines, the patient wishes to have CPR or other invasive treatments performed when his heart and/or breathing stops. I communicated to the patient and family. Continue FULL CODE.    A total  of 15 min was spent on advance care planning, goals of care discussion, emotional support, formulating and communicating prognosis and exploring burden/benefit of various approaches of treatment. This discussion occurred on a fully voluntary basis with the verbal consent of the patient and/or family.            VTE Risk Mitigation (From admission, onward)           Ordered     IP VTE HIGH RISK PATIENT  Once         11/20/24 1622     Place sequential compression device  Until discontinued         11/20/24 1622                    Discharge Planning   AUTUMN: 11/25/2024     Code Status: Full Code   Is the patient medically ready for discharge?:     Reason for patient still in hospital (select all that apply): Patient new problem, Patient trending condition, Laboratory test, Treatment, and Consult recommendations  Discharge Plan A: Home          Critical secondary to Patient has a condition that poses threat to life and bodily function:  Decompensated cirrhosis with JAE likely secondary to HRS and now with bacteremia.     Critical care was time spent personally by me on the following activities: development of treatment plan with patient or surrogate and bedside caregivers, discussions with consultants, evaluation of patient's response to treatment, examination of patient, ordering and performing treatments and interventions, ordering and review of laboratory studies, ordering and review of radiographic studies, pulse oximetry, re-evaluation of patient's condition. This critical care time did not overlap with that of any other provider or involve time for any procedures.    45 mins of Critical Care time was provided in order to assess and manage the high probability of imminent or life-threatening deterioration of cardio-respiratory status requiring vasodilator support and pending intubation          Glory Oliva MD  Department of Hospital Medicine   [facility] - Telemetry Stepdown

## 2024-11-22 NOTE — CONSULTS
[facility] - Telemetry Stepdown  Infectious Disease  Consult Note    Patient Name: Juan Carlos Yoo Sr.  MRN: [id number]  Admission Date: 11/20/2024  Hospital Length of Stay: 2 days  Attending Physician: Aditya Saha MD  Primary Care Provider: Nyla Rios FNP     Isolation Status: Special Contact    Patient information was obtained from patient and ER records.      Inpatient consult to Infectious Diseases  Consult performed by: Tyler Mejia PA-C  Consult ordered by: Glory Oliva MD        Assessment/Plan:     ID  Positive blood culture  I have reviewed hospital notes from  HM service and other specialty providers. I have also reviewed CBC, CMP/BMP,  cultures and imaging with my interpretation as documented.      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 ID consulted for discordant positive bld cxs.     Pt c/o worsening diffuse swelling Rt arm with pain and erythema after bumping on door frame few days ago; no grossly c/f cellulitis or SSTI. RUE U/S neg for DVT.  Pt AF, VSS, HDS, wbc nml.  Index bld cxs 11/20 with discordant gram stains, 1 set with GPRs, the other with GPCs resemebling staph. MS/MRSA PCR negative, BCID negative. Suspect positive bld cxs represent contaminant. Repeat bld cxs sent 11/22. TTE neg for IE or veg.    Ucx +coag-neg staph; though without urinary sxs and low colony count, suspect not clinically relevant.  RP-U/S neg for  anomaly, no stones or hydronephrosis. Pt with abd distension, but no Abd pain. Abd U/S did not reveal significant  fluid for paracentesis; only trace ascites  (IR w/o safe window) and small left pleural effusion as noted on chest-XR, with Rt basilar opacity. Pt denies cough, but endorses SOB on exertion.     Pt on empiric IV-vanc and ceftriaxone.     Recommendations / Plan:  If ongoing c/f GI bleed (Hg 6.8 on arrival), may continue empiric IV-ceftriaxone. [Of  note, small ascites, but No abd pain on exam to suggest peritonitis at this time)  May continue empiric Iv-vanc for now pending further ID on bld cxs gram stain with GPC resembling staph, may be likely contaminant. But out of caution, will keep vanc on board for now as pt has some minor abrasions which could have served as source for entry, and high risk for complications from infection.  If index bld cx returns as coag-neg staph, then it is likely a contaminant, and not clinically significant. [Of note, pt has coag-neg staph on current Ucx but without urinary sxs, and has had prior Ucx with coag neg staph, possibley 2/2 poor collection technique / contam?]  Will f/u repeat bld cxs 11/22.      -- Discussed with ID staff and primary team   -- ID will continue to follow w/ further recs.        Thank you for your consult. I will follow-up with patient. Please contact us if you have any additional questions.    Tyler Mejia PA-C  Infectious Disease  [facility] - Tele[facility]    Subjective:     Principal Problem: Decompensated cirrhosis    HPI: 69M with decompensated liver cirrhosis 2/2 ETOH, HCC s/p y90, Afib on eliquis - admitted 11/20/24 for diffuse ansarca and JAE, c/f hepapto-renal syndrome. ID consulted for discordant positive bld cxs.     For background, He was recently hospitalized 1 month ago at [facility] for volume overload in the setting of decompensated cirrhosis. He was treated with IV diuresis and discharged with adjustment to his diuretics, currently on lasix 60mg BID and metolazone 2.5mg every other week as per family. Patient reports diffuse swelling of his upper and lower extremities in addition to distended abdomen and worsening dyspnea, which he believes is due to recent medication adjustment. Family states he is non-compliant with low sodium diet and fluid restriction. Family states he has been compliant with diuretics, but hasn't taken eliquis for 3 days due to issue getting refill. Pt  claims to be compliant with medications, but is a poor historian. He follows with hepatology, not currently active on transplant list. He states he hasn't consumed alcohol for at least 9 months. Has c/o chills, but denies fever, cough, nausea, vomiting, chest pain, dysuria, hematuria, blood in stool.  Pt c/o worsening diffuse swelling Rt arm with pain and erythema after scratching on door fram few days ago. RUE U/S neg for DVT. Pt AF, VSS, HDS, wbc 15 uptrend. Index bld cxs 11/20 with discordant gram stains, 1 set with GPRs, the other with GPCs resemebling staph. MS/MRSA PCR negative, BCID negative. Suspect positive bld cxs represent contaaminant. Repeat bld cxs sent 11/22.     Ucx +coag-neg staph; though without urinary sxs. RP-U/S neg for  anomaly, no stones or hydronephrosis. Abd U/S did not reveal significant  fluid for paracentesis; only trace ascits and small left pleural effusion as noted on chest-XR, with Rt basilar opacity. Pt with infrequent and non-productive cough.     Pt on empiric IV-vanc and ceftriaxone.             Past Medical History:   Diagnosis Date    Atrial fibrillation     Bulging disc     Cirrhosis     Colon polyp 12/29/2017    Rpt 5 yrs    EV (esophageal varices)     Hypertension 3/30/2023    Skin cancer 03/2017    Thrombocytopenia        Past Surgical History:   Procedure Laterality Date    CATHETERIZATION OF BOTH LEFT AND RIGHT HEART N/A 2/8/2023    Procedure: CATHETERIZATION, HEART, BOTH LEFT AND RIGHT;  Surgeon: Flo Malone MD;  Location: [facility] CATH LAB;  Service: Cardiology;  Laterality: N/A;  low bleeding risk 1.0%    CHOLECYSTECTOMY      COLONOSCOPY      COLONOSCOPY N/A 12/29/2017    ta rpt 2022    CORONARY ANGIOGRAPHY N/A 2/8/2023    Procedure: ANGIOGRAM, CORONARY ARTERY;  Surgeon: Flo Malone MD;  Location: [facility] CATH LAB;  Service: Cardiology;  Laterality: N/A;    HERNIA REPAIR      SKIN BIOPSY  03/2017    TONSILLECTOMY      UPPER GASTROINTESTINAL ENDOSCOPY  2011    EV  "   UPPER GASTROINTESTINAL ENDOSCOPY  2016    VASECTOMY         Review of patient's allergies indicates:  No Known Allergies    Medications:  Facility-Administered Medications Prior to Admission   Medication    [DISCONTINUED] sodium chloride 0.9% flush 10 mL     Medications Prior to Admission   Medication Sig    aMILoride (MIDAMOR) 5 MG Tab Take 2 tablets (10 mg total) by mouth once daily. Start with 5 mg (1 tablet) daily, increase to 10 mg (2 tablets) daily if not losing 3 pounds a week.    ascorbic acid, vitamin C, (VITAMIN C) 500 MG tablet Take 500 mg by mouth once daily.    cyanocobalamin (VITAMIN B-12) 100 MCG tablet Take 100 mcg by mouth once daily.    fish oil-omega-3 fatty acids 300-1,000 mg capsule Take 1 g by mouth 2 (two) times daily.    furosemide (LASIX) 40 MG tablet TAKE ONE TABLET BY MOUTH TWICE DAILY    nadoloL (CORGARD) 20 MG tablet TAKE 2 TABLETS BY MOUTH EVERY EVENING    potassium chloride SA (KLOR-CON M20) 20 MEQ tablet Take 1 tablet (20 mEq total) by mouth 2 (two) times daily.    tamsulosin (FLOMAX) 0.4 mg Cap TAKE 1 CAPSULE (0.4 MG TOTAL) BY MOUTH EVERY EVENING.    amLODIPine (NORVASC) 10 MG tablet Take 1 tablet (10 mg total) by mouth once daily.    pantoprazole (PROTONIX) 40 MG tablet Take 1 tablet (40 mg total) by mouth once daily.     Antibiotics (From admission, onward)      Start     Stop Route Frequency Ordered    11/21/24 2100  mupirocin 2 % ointment         11/26/24 2059 Nasl 2 times daily 11/21/24 1329    11/20/24 2100  cefTRIAXone injection 2 g         -- IV Every 24 hours (non-standard times) 11/20/24 1848    11/20/24 1722  vancomycin - pharmacy to dose  (vancomycin IVPB (PEDS and ADULTS))        Placed in "And" Linked Group    -- IV pharmacy to manage frequency 11/20/24 1623          Antifungals (From admission, onward)      None          Antivirals (From admission, onward)      None             Immunization History   Administered Date(s) Administered    COVID-19, MRNA, LN-S, PF " (MODERNA FULL 0.5 ML DOSE) 02/25/2021, 03/27/2021    Hepatitis B (recombinant) Adjuvanted, 2 dose 12/14/2022, 01/10/2023    Influenza 01/03/2013    Influenza (FLUAD) - Quadrivalent - Adjuvanted - PF *Preferred* (65+) 09/29/2022, 10/30/2023    Influenza - Quadrivalent - PF *Preferred* (6 months and older) 01/19/2017, 02/12/2018    Influenza - Trivalent - Afluria, Fluzone MDV 10/30/2014    Influenza - Trivalent - Fluad - Adjuvanted - PF (65 years and older 10/02/2024    Influenza - Trivalent - Fluarix, Flulaval, Fluzone, Afluria - PF 01/03/2013    Influenza - Trivalent - Fluzone High Dose - PF (65 years and older) 10/08/2018, 11/20/2019    Influenza A (H1N1) 2009 Monovalent - IM - PF 12/09/2009    Pneumococcal Conjugate - 13 Valent 10/09/2018    Pneumococcal Polysaccharide - 23 Valent 04/08/2019    Td - PF (ADULT) 01/19/2017    Tdap 01/23/2022       Family History       Problem Relation (Age of Onset)    Cancer Maternal Uncle    Heart disease Maternal Uncle    Hyperlipidemia Brother    Ovarian cancer Sister          Social History     Socioeconomic History    Marital status: Single   Occupational History     Employer: [employer]   Tobacco Use    Smoking status: Never    Smokeless tobacco: Never   Substance and Sexual Activity    Alcohol use: Not Currently     Alcohol/week: 0.0 standard drinks of alcohol    Drug use: No     Social Drivers of Health     Financial Resource Strain: Low Risk  (11/21/2024)    Overall Financial Resource Strain (CARDIA)     Difficulty of Paying Living Expenses: Not very hard   Food Insecurity: No Food Insecurity (11/21/2024)    Hunger Vital Sign     Worried About Running Out of Food in the Last Year: Never true     Ran Out of Food in the Last Year: Never true   Transportation Needs: No Transportation Needs (11/21/2024)    TRANSPORTATION NEEDS     Transportation : No   Physical Activity: Inactive (11/21/2024)    Exercise Vital Sign     Days of Exercise per Week: 0 days     Minutes of  Exercise per Session: 0 min   Stress: Stress Concern Present (11/21/2024)    [employer] [place] [employer] - Occupational Stress Questionnaire     Feeling of Stress : To some extent   Housing Stability: Low Risk  (11/21/2024)    Housing Stability Vital Sign     Unable to Pay for Housing in the Last Year: No     Homeless in the Last Year: No     Review of Systems   Respiratory:  Positive for shortness of breath (on exertion).    Cardiovascular:  Positive for leg swelling.   Gastrointestinal:  Positive for abdominal distention.   All other systems reviewed and are negative.    Objective:     Vital Signs (Most Recent):  Temp: 97.8 °F (36.6 °C) (11/22/24 1630)  Pulse: 94 (11/22/24 1630)  Resp: 20 (11/22/24 1100)  BP: (!) 118/56 (11/22/24 1630)  SpO2: (!) 91 % (11/22/24 1630) Vital Signs (24h Range):  Temp:  [97.3 °F (36.3 °C)-97.8 °F (36.6 °C)] 97.8 °F (36.6 °C)  Pulse:  [] 94  Resp:  [18-21] 20  SpO2:  [86 %-96 %] 91 %  BP: (100-118)/(52-72) 118/56     Weight: (!) 148 kg (326 lb 4.5 oz)  Body mass index is 43.05 kg/m².    Estimated Creatinine Clearance: 16 mL/min (A) (based on SCr of 6.4 mg/dL (H)).     Physical Exam  Vitals and nursing note reviewed.   Constitutional:       General: He is not in acute distress.     Appearance: He is ill-appearing. He is not toxic-appearing or diaphoretic.   HENT:      Right Ear: There is no impacted cerumen.      Nose: No congestion.      Mouth/Throat:      Pharynx: No oropharyngeal exudate.   Eyes:      General: No scleral icterus.  Cardiovascular:      Rate and Rhythm: Normal rate.      Pulses: Normal pulses.      Heart sounds: Normal heart sounds.   Pulmonary:      Effort: No respiratory distress.      Breath sounds: Normal breath sounds.   Abdominal:      General: Bowel sounds are normal. There is distension.      Palpations: Abdomen is soft.      Tenderness: There is no abdominal tenderness.   Musculoskeletal:         General: Swelling and tenderness present.       Cervical back: Neck supple. No tenderness.      Right lower leg: Edema present.      Left lower leg: Edema present.   Skin:     General: Skin is warm and dry.      Coloration: Skin is not jaundiced.      Findings: Bruising present. No erythema or rash.   Neurological:      Mental Status: He is oriented to person, place, and time. Mental status is at baseline.   Psychiatric:         Behavior: Behavior normal.         Thought Content: Thought content normal.          Significant Labs: Blood Culture:   Recent Labs   Lab 11/20/24  1429 11/20/24  1444 11/22/24  0903 11/22/24  0904   LABBLOO Gram stain aer bottle: Gram positive cocci in clusters resembling Staph  Results called to and read back by:Crystal Navarrete RN 11/21/2024  23:02 Gram stain aer bottle: Gram positive rods  Results called to and read back by:Crystal Navarrete RN 11/21/2024  21:58  GRAM-POSITIVE JANE  further identified as Lysinobacillus species  * No Growth to date No Growth to date     CBC:   Recent Labs   Lab 11/21/24 2016 11/22/24  0449 11/22/24  1041   WBC 6.21 15.71* 8.52   HGB 6.8* 7.2* 7.2*   HCT 21.0* 21.5* 22.6*   PLT 67* 72* 69*     CMP:   Recent Labs   Lab 11/21/24  0324 11/21/24 2016 11/22/24  0449   * 130* 132*   K 5.6* 5.0 4.8   CL 96 95 96   CO2 18* 22* 22*   GLU 98 114* 95   BUN 48* 52* 51*   CREATININE 5.9* 6.3* 6.4*   CALCIUM 9.8 9.9 10.1   PROT 7.5  --  7.6   ALBUMIN 3.2*  --  3.9   BILITOT 7.4*  --  6.8*   ALKPHOS 45  --  47   AST 27  --  8*   ALT 9*  --  6*   ANIONGAP 16 13 14     Microbiology Results (last 7 days)       Procedure Component Value Units Date/Time    Blood culture [[id number]] Collected: 11/22/24 0903    Order Status: Completed Specimen: Blood Updated: 11/22/24 1545     Blood Culture, Routine No Growth to date    Blood culture [[id number]] Collected: 11/22/24 0904    Order Status: Completed Specimen: Blood Updated: 11/22/24 1545     Blood Culture, Routine No Growth to date    Blood culture x two cultures. Draw  prior to antibiotics. [[id number]]  (Abnormal) Collected: 11/20/24 1444    Order Status: Completed Specimen: Blood from Peripheral, Wrist, Left Updated: 11/22/24 1448     Blood Culture, Routine Gram stain aer bottle: Gram positive rods      Results called to and read back by:Crystal Navarrete RN 11/21/2024  21:58      GRAM-POSITIVE JANE  further identified as Lysinobacillus species      Narrative:      Aerobic and anaerobic    Blood culture x two cultures. Draw prior to antibiotics. [[id number]] Collected: 11/20/24 1429    Order Status: Completed Specimen: Blood from Peripheral, Hand, Right Updated: 11/22/24 0926     Blood Culture, Routine Gram stain aer bottle: Gram positive cocci in clusters resembling Staph      Results called to and read back by:Crystal Navarrete RN 11/21/2024  23:02    Narrative:      Aerobic and anaerobic    Urine culture [[id number]]  (Abnormal) Collected: 11/20/24 1801    Order Status: Completed Specimen: Urine Updated: 11/22/24 0301     Urine Culture, Routine COAGULASE-NEGATIVE STAPHYLOCOCCUS SPECIES  10,000 - 49,999 cfu/ml  Susceptibility testing not routinely performed.      Narrative:      Specimen Source->Urine    MRSA/SA Rapid ID by PCR from Blood culture [[id number]] Collected: 11/20/24 1429    Order Status: Completed Updated: 11/22/24 0010     Staph aureus ID by PCR Negative     Methicillin Resistant ID by PCR Negative    Narrative:      Aerobic and anaerobic    Rapid Organism ID by PCR (from Blood culture) [[id number]] Collected: 11/20/24 1444    Order Status: Completed Updated: 11/21/24 2202     Enterococcus faecalis Not Detected     Enterococcus faecium Not Detected     Listeria monocytogenes Not Detected     Staphylococcus spp. Not Detected     Staphylococcus aureus Not Detected     Staphylococcus epidermidis Not Detected     Staphylococcus lugdunensis Not Detected     Streptococcus species Not Detected     Streptococcus agalactiae Not Detected     Streptococcus pneumoniae Not Detected      Streptococcus pyogenes Not Detected     Acinetobacter calcoaceticus/baumannii complex Not Detected     Bacteroides fragilis Not Detected     Enterobacterales Not Detected     Enterobacter cloacae complex Not Detected     Escherichia coli Not Detected     Klebsiella aerogenes Not Detected     Klebsiella oxytoca Not Detected     Klebsiella pneumoniae group Not Detected     Proteus Not Detected     Salmonella sp Not Detected     Serratia marcescens Not Detected     Haemophilus influenzae Not Detected     Neisseria meningtidis Not Detected     Pseudomonas aeruginosa Not Detected     Stenotrophomonas maltophilia Not Detected     Candida albicans Not Detected     Candida auris Not Detected     Candida glabrata Not Detected     Candida krusei Not Detected     Candida parapsilosis Not Detected     Candida tropicalis Not Detected     Cryptococcus neoformans/gattii Not Detected     CTX-M (ESBL ) Test Not Applicable     IMP (Carbapenem resistant) Test Not Applicable     KPC resistance gene (Carbapenem resistant) Test Not Applicable     mcr-1  Test Not Applicable     mec A/C  Test Not Applicable     mec A/C and MREJ (MRSA) gene Test Not Applicable     NDM (Carbapenem resistant) Test Not Applicable     OXA-48-like (Carbapenem resistant) Test Not Applicable     van A/B (VRE gene) Test Not Applicable     VIM (Carbapenem resistant) Test Not Applicable    Narrative:      Aerobic and anaerobic    (rule out SBP) Gram stain [[id number]]     Order Status: No result Specimen: Ascites     (rule out SBP) Aerobic culture [[id number]]     Order Status: No result Specimen: Ascites     (rule out SBP) Culture, Anaerobic [[id number]]     Order Status: No result Specimen: Ascites     Clostridium difficile EIA [[id number]]     Order Status: Canceled Specimen: Stool     Stool culture [[id number]]     Order Status: No result Specimen: Stool           All pertinent labs within the past 24 hours have been reviewed.    Significant Imaging:  I have reviewed all pertinent imaging results/findings within the past 24 hours.    I have personally reviewed records / hospital notes from   service and other specialty providers. I have also reviewed CBC, CMP/BMP,  cultures and imaging with my interpretation as documented in my assessment / plan.    Patient is high risk for infectious complications given pt's age, multiple co-morbidities, and case complexity.      Time: 75 minutes   50% of time spent on face-to-face counseling and coordination of care. Counseling included review of test results, diagnosis, and treatment plan with patient and/or family.

## 2024-11-22 NOTE — ASSESSMENT & PLAN NOTE
--Nephrology following  --Strict intake and output  --Renally dose all medications  --Avoid nephrotoxic agents

## 2024-11-22 NOTE — ASSESSMENT & PLAN NOTE
2/2 cirrhosis. See plan below    Recent Labs   Lab 11/22/24  9919   INR 1.9*   APTT 38.3*     We will give 3 doses of IV vitamin K.

## 2024-11-22 NOTE — ASSESSMENT & PLAN NOTE
Baseline creatinine is  1.5 . Most recent creatinine and eGFR are listed below.    Recent Labs     11/21/24  0324 11/21/24 2016 11/22/24  0449   CREATININE 5.9* 6.3* 6.4*   EGFRNORACEVR 9.6* 8.8* 8.7*        Plan  - JAE is worsening. Will continue current treatment  - Avoid nephrotoxins and renally dose meds for GFR listed above  - Monitor urine output, serial BMP, and adjust therapy as needed    - Etiology includes volume overload, obstruction, hypotension, possible HRS  - monitor peacock catheter and monitor strict I&O  - was started on midodrine and albumin.  Octreotide started 11/22/2024.  -appreciate nephrology recommendations and recommendation to give 1 dose of IV Lasix 120 mg.  ICU consultation requested for Levophed for goal map of more than 85.

## 2024-11-22 NOTE — ASSESSMENT & PLAN NOTE
Likely dilutional secondary to end stage liver disease and HRS    Will monitor for S&S of hyponatremia and correct when clinically appropriate.

## 2024-11-22 NOTE — SUBJECTIVE & OBJECTIVE
Past Medical History:   Diagnosis Date    Atrial fibrillation     Bulging disc     Cirrhosis     Colon polyp 12/29/2017    Rpt 5 yrs    EV (esophageal varices)     Hypertension 3/30/2023    Skin cancer 03/2017    Thrombocytopenia        Past Surgical History:   Procedure Laterality Date    CATHETERIZATION OF BOTH LEFT AND RIGHT HEART N/A 2/8/2023    Procedure: CATHETERIZATION, HEART, BOTH LEFT AND RIGHT;  Surgeon: Flo Malone MD;  Location: [facility] CATH LAB;  Service: Cardiology;  Laterality: N/A;  low bleeding risk 1.0%    CHOLECYSTECTOMY      COLONOSCOPY      COLONOSCOPY N/A 12/29/2017    ta rpt 2022    CORONARY ANGIOGRAPHY N/A 2/8/2023    Procedure: ANGIOGRAM, CORONARY ARTERY;  Surgeon: Flo Malone MD;  Location: [facility] CATH LAB;  Service: Cardiology;  Laterality: N/A;    HERNIA REPAIR      SKIN BIOPSY  03/2017    TONSILLECTOMY      UPPER GASTROINTESTINAL ENDOSCOPY  2011    EV    UPPER GASTROINTESTINAL ENDOSCOPY  2016    VASECTOMY         Review of patient's allergies indicates:  No Known Allergies    Medications:  Facility-Administered Medications Prior to Admission   Medication    [DISCONTINUED] sodium chloride 0.9% flush 10 mL     Medications Prior to Admission   Medication Sig    aMILoride (MIDAMOR) 5 MG Tab Take 2 tablets (10 mg total) by mouth once daily. Start with 5 mg (1 tablet) daily, increase to 10 mg (2 tablets) daily if not losing 3 pounds a week.    ascorbic acid, vitamin C, (VITAMIN C) 500 MG tablet Take 500 mg by mouth once daily.    cyanocobalamin (VITAMIN B-12) 100 MCG tablet Take 100 mcg by mouth once daily.    fish oil-omega-3 fatty acids 300-1,000 mg capsule Take 1 g by mouth 2 (two) times daily.    furosemide (LASIX) 40 MG tablet TAKE ONE TABLET BY MOUTH TWICE DAILY    nadoloL (CORGARD) 20 MG tablet TAKE 2 TABLETS BY MOUTH EVERY EVENING    potassium chloride SA (KLOR-CON M20) 20 MEQ tablet Take 1 tablet (20 mEq total) by mouth 2 (two) times daily.    tamsulosin (FLOMAX) 0.4 mg Cap  "TAKE 1 CAPSULE (0.4 MG TOTAL) BY MOUTH EVERY EVENING.    amLODIPine (NORVASC) 10 MG tablet Take 1 tablet (10 mg total) by mouth once daily.    pantoprazole (PROTONIX) 40 MG tablet Take 1 tablet (40 mg total) by mouth once daily.     Antibiotics (From admission, onward)      Start     Stop Route Frequency Ordered    11/21/24 2100  mupirocin 2 % ointment         11/26/24 2059 Nasl 2 times daily 11/21/24 1329    11/20/24 2100  cefTRIAXone injection 2 g         -- IV Every 24 hours (non-standard times) 11/20/24 1848    11/20/24 1722  vancomycin - pharmacy to dose  (vancomycin IVPB (PEDS and ADULTS))        Placed in "And" Linked Group    -- IV pharmacy to manage frequency 11/20/24 1623          Antifungals (From admission, onward)      None          Antivirals (From admission, onward)      None             Immunization History   Administered Date(s) Administered    COVID-19, MRNA, LN-S, PF (MODERNA FULL 0.5 ML DOSE) 02/25/2021, 03/27/2021    Hepatitis B (recombinant) Adjuvanted, 2 dose 12/14/2022, 01/10/2023    Influenza 01/03/2013    Influenza (FLUAD) - Quadrivalent - Adjuvanted - PF *Preferred* (65+) 09/29/2022, 10/30/2023    Influenza - Quadrivalent - PF *Preferred* (6 months and older) 01/19/2017, 02/12/2018    Influenza - Trivalent - Afluria, Fluzone MDV 10/30/2014    Influenza - Trivalent - Fluad - Adjuvanted - PF (65 years and older 10/02/2024    Influenza - Trivalent - Fluarix, Flulaval, Fluzone, Afluria - PF 01/03/2013    Influenza - Trivalent - Fluzone High Dose - PF (65 years and older) 10/08/2018, 11/20/2019    Influenza A (H1N1) 2009 Monovalent - IM - PF 12/09/2009    Pneumococcal Conjugate - 13 Valent 10/09/2018    Pneumococcal Polysaccharide - 23 Valent 04/08/2019    Td - PF (ADULT) 01/19/2017    Tdap 01/23/2022       Family History       Problem Relation (Age of Onset)    Cancer Maternal Uncle    Heart disease Maternal Uncle    Hyperlipidemia Brother    Ovarian cancer Sister          Social History "     Socioeconomic History    Marital status: Single   Occupational History     Employer: [employer]   Tobacco Use    Smoking status: Never    Smokeless tobacco: Never   Substance and Sexual Activity    Alcohol use: Not Currently     Alcohol/week: 0.0 standard drinks of alcohol    Drug use: No     Social Drivers of Health     Financial Resource Strain: Low Risk  (11/21/2024)    Overall Financial Resource Strain (CARDIA)     Difficulty of Paying Living Expenses: Not very hard   Food Insecurity: No Food Insecurity (11/21/2024)    Hunger Vital Sign     Worried About Running Out of Food in the Last Year: Never true     Ran Out of Food in the Last Year: Never true   Transportation Needs: No Transportation Needs (11/21/2024)    TRANSPORTATION NEEDS     Transportation : No   Physical Activity: Inactive (11/21/2024)    Exercise Vital Sign     Days of Exercise per Week: 0 days     Minutes of Exercise per Session: 0 min   Stress: Stress Concern Present (11/21/2024)    [employer] - Occupational Stress Questionnaire     Feeling of Stress : To some extent   Housing Stability: Low Risk  (11/21/2024)    Housing Stability Vital Sign     Unable to Pay for Housing in the Last Year: No     Homeless in the Last Year: No     Review of Systems   Respiratory:  Positive for shortness of breath (on exertion).    Cardiovascular:  Positive for leg swelling.   Gastrointestinal:  Positive for abdominal distention.   All other systems reviewed and are negative.    Objective:     Vital Signs (Most Recent):  Temp: 97.8 °F (36.6 °C) (11/22/24 1630)  Pulse: 94 (11/22/24 1630)  Resp: 20 (11/22/24 1100)  BP: (!) 118/56 (11/22/24 1630)  SpO2: (!) 91 % (11/22/24 1630) Vital Signs (24h Range):  Temp:  [97.3 °F (36.3 °C)-97.8 °F (36.6 °C)] 97.8 °F (36.6 °C)  Pulse:  [] 94  Resp:  [18-21] 20  SpO2:  [86 %-96 %] 91 %  BP: (100-118)/(52-72) 118/56     Weight: (!) 148 kg (326 lb 4.5 oz)  Body mass index is 43.05  kg/m².    Estimated Creatinine Clearance: 16 mL/min (A) (based on SCr of 6.4 mg/dL (H)).     Physical Exam  Vitals and nursing note reviewed.   Constitutional:       General: He is not in acute distress.     Appearance: He is ill-appearing. He is not toxic-appearing or diaphoretic.   HENT:      Right Ear: There is no impacted cerumen.      Nose: No congestion.      Mouth/Throat:      Pharynx: No oropharyngeal exudate.   Eyes:      General: No scleral icterus.  Cardiovascular:      Rate and Rhythm: Normal rate.      Pulses: Normal pulses.      Heart sounds: Normal heart sounds.   Pulmonary:      Effort: No respiratory distress.      Breath sounds: Normal breath sounds.   Abdominal:      General: Bowel sounds are normal. There is distension.      Palpations: Abdomen is soft.      Tenderness: There is no abdominal tenderness.   Musculoskeletal:         General: Swelling and tenderness present.      Cervical back: Neck supple. No tenderness.      Right lower leg: Edema present.      Left lower leg: Edema present.   Skin:     General: Skin is warm and dry.      Coloration: Skin is not jaundiced.      Findings: Bruising present. No erythema or rash.   Neurological:      Mental Status: He is oriented to person, place, and time. Mental status is at baseline.   Psychiatric:         Behavior: Behavior normal.         Thought Content: Thought content normal.          Significant Labs: Blood Culture:   Recent Labs   Lab 11/20/24  1429 11/20/24  1444 11/22/24  0903 11/22/24  0904   LABBLOO Gram stain aer bottle: Gram positive cocci in clusters resembling Staph  Results called to and read back by:Crystal Navarrete RN 11/21/2024  23:02 Gram stain aer bottle: Gram positive rods  Results called to and read back by:Crystal Navarrete RN 11/21/2024  21:58  GRAM-POSITIVE JANE  further identified as Lysinobacillus species  * No Growth to date No Growth to date     CBC:   Recent Labs   Lab 11/21/24  2016 11/22/24  0449 11/22/24  1041   WBC  6.21 15.71* 8.52   HGB 6.8* 7.2* 7.2*   HCT 21.0* 21.5* 22.6*   PLT 67* 72* 69*     CMP:   Recent Labs   Lab 11/21/24  0324 11/21/24 2016 11/22/24  0449   * 130* 132*   K 5.6* 5.0 4.8   CL 96 95 96   CO2 18* 22* 22*   GLU 98 114* 95   BUN 48* 52* 51*   CREATININE 5.9* 6.3* 6.4*   CALCIUM 9.8 9.9 10.1   PROT 7.5  --  7.6   ALBUMIN 3.2*  --  3.9   BILITOT 7.4*  --  6.8*   ALKPHOS 45  --  47   AST 27  --  8*   ALT 9*  --  6*   ANIONGAP 16 13 14     Microbiology Results (last 7 days)       Procedure Component Value Units Date/Time    Blood culture [[id number]] Collected: 11/22/24 0903    Order Status: Completed Specimen: Blood Updated: 11/22/24 1545     Blood Culture, Routine No Growth to date    Blood culture [[id number]] Collected: 11/22/24 0904    Order Status: Completed Specimen: Blood Updated: 11/22/24 1545     Blood Culture, Routine No Growth to date    Blood culture x two cultures. Draw prior to antibiotics. [[id number]]  (Abnormal) Collected: 11/20/24 1444    Order Status: Completed Specimen: Blood from Peripheral, Wrist, Left Updated: 11/22/24 1448     Blood Culture, Routine Gram stain aer bottle: Gram positive rods      Results called to and read back by:Crystal Navarrete RN 11/21/2024  21:58      GRAM-POSITIVE JANE  further identified as Lysinobacillus species      Narrative:      Aerobic and anaerobic    Blood culture x two cultures. Draw prior to antibiotics. [[id number]] Collected: 11/20/24 1429    Order Status: Completed Specimen: Blood from Peripheral, Hand, Right Updated: 11/22/24 0926     Blood Culture, Routine Gram stain aer bottle: Gram positive cocci in clusters resembling Staph      Results called to and read back by:Crystal Navarrete RN 11/21/2024  23:02    Narrative:      Aerobic and anaerobic    Urine culture [[id number]]  (Abnormal) Collected: 11/20/24 1801    Order Status: Completed Specimen: Urine Updated: 11/22/24 0301     Urine Culture, Routine COAGULASE-NEGATIVE STAPHYLOCOCCUS  SPECIES  10,000 - 49,999 cfu/ml  Susceptibility testing not routinely performed.      Narrative:      Specimen Source->Urine    MRSA/SA Rapid ID by PCR from Blood culture [[id number]] Collected: 11/20/24 1429    Order Status: Completed Updated: 11/22/24 0010     Staph aureus ID by PCR Negative     Methicillin Resistant ID by PCR Negative    Narrative:      Aerobic and anaerobic    Rapid Organism ID by PCR (from Blood culture) [[id number]] Collected: 11/20/24 1444    Order Status: Completed Updated: 11/21/24 2202     Enterococcus faecalis Not Detected     Enterococcus faecium Not Detected     Listeria monocytogenes Not Detected     Staphylococcus spp. Not Detected     Staphylococcus aureus Not Detected     Staphylococcus epidermidis Not Detected     Staphylococcus lugdunensis Not Detected     Streptococcus species Not Detected     Streptococcus agalactiae Not Detected     Streptococcus pneumoniae Not Detected     Streptococcus pyogenes Not Detected     Acinetobacter calcoaceticus/baumannii complex Not Detected     Bacteroides fragilis Not Detected     Enterobacterales Not Detected     Enterobacter cloacae complex Not Detected     Escherichia coli Not Detected     Klebsiella aerogenes Not Detected     Klebsiella oxytoca Not Detected     Klebsiella pneumoniae group Not Detected     Proteus Not Detected     Salmonella sp Not Detected     Serratia marcescens Not Detected     Haemophilus influenzae Not Detected     Neisseria meningtidis Not Detected     Pseudomonas aeruginosa Not Detected     Stenotrophomonas maltophilia Not Detected     Candida albicans Not Detected     Candida auris Not Detected     Candida glabrata Not Detected     Candida krusei Not Detected     Candida parapsilosis Not Detected     Candida tropicalis Not Detected     Cryptococcus neoformans/gattii Not Detected     CTX-M (ESBL ) Test Not Applicable     IMP (Carbapenem resistant) Test Not Applicable     KPC resistance gene (Carbapenem  resistant) Test Not Applicable     mcr-1  Test Not Applicable     mec A/C  Test Not Applicable     mec A/C and MREJ (MRSA) gene Test Not Applicable     NDM (Carbapenem resistant) Test Not Applicable     OXA-48-like (Carbapenem resistant) Test Not Applicable     van A/B (VRE gene) Test Not Applicable     VIM (Carbapenem resistant) Test Not Applicable    Narrative:      Aerobic and anaerobic    (rule out SBP) Gram stain [[id number]]     Order Status: No result Specimen: Ascites     (rule out SBP) Aerobic culture [[id number]]     Order Status: No result Specimen: Ascites     (rule out SBP) Culture, Anaerobic [[id number]]     Order Status: No result Specimen: Ascites     Clostridium difficile EIA [[id number]]     Order Status: Canceled Specimen: Stool     Stool culture [[id number]]     Order Status: No result Specimen: Stool           All pertinent labs within the past 24 hours have been reviewed.    Significant Imaging: I have reviewed all pertinent imaging results/findings within the past 24 hours.    I have personally reviewed records / hospital notes from   service and other specialty providers. I have also reviewed CBC, CMP/BMP,  cultures and imaging with my interpretation as documented in my assessment / plan.    Patient is high risk for infectious complications given pt's age, multiple co-morbidities, and case complexity.      Time: 75 minutes   50% of time spent on face-to-face counseling and coordination of care. Counseling included review of test results, diagnosis, and treatment plan with patient and/or family.

## 2024-11-22 NOTE — CONSULTS
Please see H&P from Dr. Wong 11/22/24 at 2:17pm.     YAZMIN Hayden, MSN, [facility]  Pulmonary Critical Care Medicine   11/22/2024

## 2024-11-22 NOTE — ASSESSMENT & PLAN NOTE
Creatinine stable for now. BMP reviewed- noted Estimated Creatinine Clearance: 16 mL/min (A) (based on SCr of 6.4 mg/dL (H)). according to latest data. Based on current GFR, CKD stage is stage 3 - GFR 30-59.  Monitor UOP and serial BMP and adjust therapy as needed. Renally dose meds. Avoid nephrotoxic medications and procedures. See JAE (acute kidney injury).

## 2024-11-22 NOTE — SUBJECTIVE & OBJECTIVE
Past Medical History:   Diagnosis Date    Atrial fibrillation     Bulging disc     Cirrhosis     Colon polyp 12/29/2017    Rpt 5 yrs    EV (esophageal varices)     Hypertension 3/30/2023    Skin cancer 03/2017    Thrombocytopenia        Past Surgical History:   Procedure Laterality Date    CATHETERIZATION OF BOTH LEFT AND RIGHT HEART N/A 2/8/2023    Procedure: CATHETERIZATION, HEART, BOTH LEFT AND RIGHT;  Surgeon: Flo Malone MD;  Location: [facility] CATH LAB;  Service: Cardiology;  Laterality: N/A;  low bleeding risk 1.0%    CHOLECYSTECTOMY      COLONOSCOPY      COLONOSCOPY N/A 12/29/2017    ta rpt 2022    CORONARY ANGIOGRAPHY N/A 2/8/2023    Procedure: ANGIOGRAM, CORONARY ARTERY;  Surgeon: Flo Malone MD;  Location: [facility] CATH LAB;  Service: Cardiology;  Laterality: N/A;    HERNIA REPAIR      SKIN BIOPSY  03/2017    TONSILLECTOMY      UPPER GASTROINTESTINAL ENDOSCOPY  2011    EV    UPPER GASTROINTESTINAL ENDOSCOPY  2016    VASECTOMY         Review of patient's allergies indicates:  No Known Allergies    Family History       Problem Relation (Age of Onset)    Cancer Maternal Uncle    Heart disease Maternal Uncle    Hyperlipidemia Brother    Ovarian cancer Sister          Tobacco Use    Smoking status: Never    Smokeless tobacco: Never   Substance and Sexual Activity    Alcohol use: Not Currently     Alcohol/week: 0.0 standard drinks of alcohol    Drug use: No    Sexual activity: Not on file      Review of Systems   Constitutional:  Positive for fatigue. Negative for chills and fever.   HENT:  Negative for congestion.    Respiratory:  Negative for cough, shortness of breath and wheezing.    Cardiovascular:  Positive for leg swelling.   Gastrointestinal:  Positive for abdominal distention. Negative for abdominal pain, diarrhea and nausea.   Genitourinary:  Positive for scrotal swelling. Negative for dysuria and frequency.   Musculoskeletal:  Negative for arthralgias, joint swelling and myalgias.   Skin:   Positive for rash and wound.   Neurological:  Negative for dizziness and light-headedness.   Psychiatric/Behavioral:  Negative for confusion.      Objective:     Vital Signs (Most Recent):  Temp: 97.4 °F (36.3 °C) (11/22/24 1100)  Pulse: 104 (11/22/24 1121)  Resp: 20 (11/22/24 1100)  BP: 110/72 (11/22/24 1121)  SpO2: (!) 94 % (11/22/24 1121) Vital Signs (24h Range):  Temp:  [97.3 °F (36.3 °C)-97.8 °F (36.6 °C)] 97.4 °F (36.3 °C)  Pulse:  [] 104  Resp:  [18-21] 20  SpO2:  [86 %-96 %] 94 %  BP: (100-114)/(52-72) 110/72   Weight: (!) 148 kg (326 lb 4.5 oz)  Body mass index is 43.05 kg/m².      Intake/Output Summary (Last 24 hours) at 11/22/2024 1346  Last data filed at 11/22/2024 1016  Gross per 24 hour   Intake 283 ml   Output 900 ml   Net -617 ml          Physical Exam  Constitutional:       General: He is not in acute distress.     Appearance: He is obese. He is ill-appearing (chronic). He is not toxic-appearing.   HENT:      Head: Normocephalic and atraumatic.   Eyes:      General: Scleral icterus present.      Extraocular Movements: Extraocular movements intact.      Conjunctiva/sclera: Conjunctivae normal.   Cardiovascular:      Rate and Rhythm: Normal rate. Rhythm irregular.      Heart sounds: Normal heart sounds.   Pulmonary:      Effort: Pulmonary effort is normal. No respiratory distress.      Breath sounds: No wheezing.   Abdominal:      General: Bowel sounds are normal. There is distension.      Palpations: Abdomen is soft.      Tenderness: There is no abdominal tenderness.      Hernia: A hernia (umbilical and L inguinal hernia) is present.   Musculoskeletal:         General: Normal range of motion.      Right upper arm: Swelling and edema present.      Left upper arm: Swelling and edema present.      Cervical back: Normal range of motion and neck supple.      Right lower leg: Edema present.      Left lower leg: Edema present.   Skin:     General: Skin is warm and dry.      Coloration: Skin is not  jaundiced.      Findings: Erythema (RUE) present.   Neurological:      General: No focal deficit present.      Mental Status: He is alert and oriented to person, place, and time. He is confused.      Comments: [employer]   Psychiatric:         Attention and Perception: He is inattentive.         Behavior: Behavior normal.            Vents:     Lines/Drains/Airways       Drain  Duration                  Urethral Catheter 11/20/24 1802 Double-lumen 1 day              Peripheral Intravenous Line  Duration                  Peripheral IV - Single Lumen 11/21/24 2300 20 G Right Breast <1 day         Peripheral IV - Single Lumen 11/22/24 1159 22 G Anterior;Right Upper Arm <1 day                  Significant Labs:    CBC/Anemia Profile:  Recent Labs   Lab 11/20/24 2210 11/21/24 0324 11/21/24 2016 11/22/24  0449 11/22/24  1041   WBC  --    < > 6.21 15.71* 8.52   HGB  --    < > 6.8* 7.2* 7.2*   HCT  --    < > 21.0* 21.5* 22.6*   PLT  --    < > 67* 72* 69*   MCV  --    < > 74* 76* 80*   RDW  --    < > 20.1* 20.5* 21.4*   IRON 25*  --   --   --   --    FERRITIN 36  --   --   --   --    RETIC  --   --   --  2.4*  --    FOLATE 6.1  --   --   --   --    [id number] >2000*  --   --   --   --     < > = values in this interval not displayed.        Chemistries:  Recent Labs   Lab 11/20/24  1349 11/20/24 2210 11/21/24 0324 11/21/24 2016 11/22/24  0449   *   < > 130* 130* 132*   K 6.1*   < > 5.6* 5.0 4.8   CL 96   < > 96 95 96   CO2 20*   < > 18* 22* 22*   BUN 49*   < > 48* 52* 51*   CREATININE 5.7*   < > 5.9* 6.3* 6.4*   CALCIUM 9.6   < > 9.8 9.9 10.1   ALBUMIN 2.8*  --  3.2*  --  3.9   PROT 7.6  --  7.5  --  7.6   BILITOT 4.8*  --  7.4*  --  6.8*   ALKPHOS 47  --  45  --  47   ALT 9*  --  9*  --  6*   AST 35  --  27  --  8*   MG  --   --  2.4  --  2.6   PHOS  --   --  5.9*  --  6.1*    < > = values in this interval not displayed.       CMP:   Recent Labs   Lab 11/20/24  1349 11/20/24  2210 11/21/24  0324 11/21/24 2016  11/22/24  0449   *   < > 130* 130* 132*   K 6.1*   < > 5.6* 5.0 4.8   CL 96   < > 96 95 96   CO2 20*   < > 18* 22* 22*   GLU 98   < > 98 114* 95   BUN 49*   < > 48* 52* 51*   CREATININE 5.7*   < > 5.9* 6.3* 6.4*   CALCIUM 9.6   < > 9.8 9.9 10.1   PROT 7.6  --  7.5  --  7.6   ALBUMIN 2.8*  --  3.2*  --  3.9   BILITOT 4.8*  --  7.4*  --  6.8*   ALKPHOS 47  --  45  --  47   AST 35  --  27  --  8*   ALT 9*  --  9*  --  6*   ANIONGAP 12   < > 16 13 14    < > = values in this interval not displayed.       Significant Imaging: I have reviewed all pertinent imaging results/findings within the past 24 hours.

## 2024-11-22 NOTE — ASSESSMENT & PLAN NOTE
Creatine stable for now. BMP reviewed- noted Estimated Creatinine Clearance: 16 mL/min (A) (based on SCr of 6.4 mg/dL (H)). according to latest data. Based on current GFR, CKD stage is stage 3 - GFR 30-59.  Monitor UOP and serial BMP and adjust therapy as needed. Renally dose meds. Avoid nephrotoxic medications and procedures.    See above

## 2024-11-22 NOTE — ASSESSMENT & PLAN NOTE
Volume Overload  Etoh Cirrhosis  Hx of HCC s/p Y90    MELD 3.0: 35 at 11/22/2024  4:49 AM  MELD-Na: 35 at 11/22/2024  4:49 AM  Calculated from:  Serum Creatinine: 6.4 mg/dL (Using max of 3 mg/dL) at 11/22/2024  4:49 AM  Serum Sodium: 132 mmol/L at 11/22/2024  4:49 AM  Total Bilirubin: 6.8 mg/dL at 11/22/2024  4:49 AM  Serum Albumin: 3.9 g/dL (Using max of 3.5 g/dL) at 11/22/2024  4:49 AM  INR(ratio): 1.9 at 11/22/2024  4:49 AM  Age at listing (hypothetical): 69 years  Sex: Male at 11/22/2024  4:49 AM     Progressively worsening anasarca    Received lasix 80mg IVP in ED, diuretics were held.    We will give 120 mg of IV Lasix per Nephrology 11/22/2024.  Also requested ICU for Levophed to maintain map over 85.  ICU consulted    --Nephrology consulted for HRS. continue albumin and midodrine.  Octreotide added 11/22/2024  -- blood cultures positive for Gram-positive cocci and Gram-negative rods.  Continue vanc and Rocephin.  Id consulted.  -- IR assessed 11/21 however no pocket for safe paracentesis  -- PETH and serological workup checked.  -- Appreciate hepatology recommendations

## 2024-11-22 NOTE — ASSESSMENT & PLAN NOTE
--Daily CBC  --Transfuse for PLT<10k, or PLT<50K with active bleeding  --Monitor for signs and symptoms of bleeding

## 2024-11-22 NOTE — SUBJECTIVE & OBJECTIVE
Interval History: NAEON. Patient reports no improvement of symptoms, SOB worse with exertion. UOP 450cc over past 24 hours. Worsening renal function, creatinine 6.4 today from 5.9.     Review of patient's allergies indicates:  No Known Allergies  Current Facility-Administered Medications   Medication Frequency    albuterol-ipratropium 2.5 mg-0.5 mg/3 mL nebulizer solution 3 mL Q6H PRN    aluminum-magnesium hydroxide-simethicone 200-200-20 mg/5 mL suspension 30 mL QID PRN    ascorbic acid (vitamin C) tablet 500 mg Daily    cefTRIAXone injection 2 g Q24H    chlorhexidine 0.12 % solution 15 mL BID    cyanocobalamin tablet 250 mcg Daily    dextrose 10% bolus 125 mL 125 mL PRN    dextrose 10% bolus 250 mL 250 mL PRN    glucagon (human recombinant) injection 1 mg PRN    glucose chewable tablet 16 g PRN    glucose chewable tablet 24 g PRN    lactulose 20 gram/30 mL solution Soln 20 g TID    melatonin tablet 6 mg Nightly PRN    midodrine tablet 10 mg TID    mupirocin 2 % ointment BID    naloxone 0.4 mg/mL injection 0.02 mg PRN    octreotide (SANDOSTATIN) 500 mcg in 0.9% NaCl 100 mL infusion Continuous    ondansetron injection 4 mg Q8H PRN    pantoprazole injection 40 mg BID    phytonadione vitamin k (AQUA-MEPHYTON) 10 mg in D5W 50 mL IVPB Daily    simethicone chewable tablet 80 mg QID PRN    sodium chloride 0.9% flush 10 mL Q12H PRN    sodium chloride 0.9% flush 10 mL PRN    tamsulosin 24 hr capsule 0.4 mg QHS    vancomycin - pharmacy to dose pharmacy to manage frequency       Objective:     Vital Signs (Most Recent):  Temp: 97.4 °F (36.3 °C) (11/22/24 1100)  Pulse: 93 (11/22/24 1533)  Resp: 20 (11/22/24 1100)  BP: 118/68 (11/22/24 1533)  SpO2: (!) 91 % (11/22/24 1533) Vital Signs (24h Range):  Temp:  [97.3 °F (36.3 °C)-97.8 °F (36.6 °C)] 97.4 °F (36.3 °C)  Pulse:  [] 93  Resp:  [18-21] 20  SpO2:  [86 %-96 %] 91 %  BP: (100-118)/(52-72) 118/68     Weight: (!) 148 kg (326 lb 4.5 oz) (11/22/24 0400)  Body mass index is  43.05 kg/m².  Body surface area is 2.76 meters squared.    I/O last 3 completed shifts:  In: 634.5 [Blood:634.5]  Out: 450 [Urine:450]     Physical Exam  Vitals and nursing note reviewed.   Constitutional:       General: He is awake. He is not in acute distress.     Appearance: He is obese. He is not ill-appearing or toxic-appearing.      Comments: Boo catheter in place, yellow concentrated urine in bag   HENT:      Head: Normocephalic and atraumatic.   Eyes:      General: No scleral icterus.     Extraocular Movements: Extraocular movements intact.      Conjunctiva/sclera: Conjunctivae normal.   Cardiovascular:      Rate and Rhythm: Normal rate and regular rhythm.   Pulmonary:      Effort: Pulmonary effort is normal. No respiratory distress.      Breath sounds: No wheezing.   Abdominal:      General: There is distension.      Palpations: Abdomen is soft.      Tenderness: There is no abdominal tenderness. There is no guarding or rebound.   Musculoskeletal:         General: No swelling.      Right lower leg: Edema present.      Left lower leg: Edema present.      Comments: 3+ pitting edema in bilateral lower extremities up to level of knees   Skin:     General: Skin is warm.      Coloration: Skin is not jaundiced.   Neurological:      Mental Status: He is alert and oriented to person, place, and time.      Motor: No weakness.   Psychiatric:         Behavior: Behavior is cooperative.          Significant Labs:  CBC:   Recent Labs   Lab 11/22/24  1041   WBC 8.52   RBC 2.83*   HGB 7.2*   HCT 22.6*   PLT 69*   MCV 80*   MCH 25.4*   MCHC 31.9*     CMP:   Recent Labs   Lab 11/22/24  0449   GLU 95   CALCIUM 10.1   ALBUMIN 3.9   PROT 7.6   *   K 4.8   CO2 22*   CL 96   BUN 51*   CREATININE 6.4*   ALKPHOS 47   ALT 6*   AST 8*   BILITOT 6.8*     All labs within the past 24 hours have been reviewed.     Significant Imaging:  All imaging within the past 24 hours have been reviewed.

## 2024-11-22 NOTE — ASSESSMENT & PLAN NOTE
Baseline creatinine is 1.5    Cr trend 6.3 > 6.4     Plan  - JAE is worsening. Will continue current treatment  - Avoid nephrotoxins and renally dose meds for GFR listed above  - Monitor urine output, serial BMP, and adjust therapy as needed     - Etiology includes volume overload, obstruction, hypotension, possible HRS  - monitor peacock catheter and monitor strict I&O  - was started on midodrine and albumin.  Octreotide started 11/22/2024.  -appreciate nephrology recommendations and recommendation to give 1 dose of IV Lasix 120 mg.   - ICU consulted for Levo administration with MAP goal 70-75. Peripheral Levophed ordered and will titrate as needed.

## 2024-11-22 NOTE — SUBJECTIVE & OBJECTIVE
Interval History:     NAEON.    Urine output of 400 mL from morning shift to noon.  One reported bowel movement last night.  No bowel movements this morning.    Contacted ICU for Levophed to maintain maps more than 85.    Appreciate Nephrology and hepatology recommendations.    Review of Systems   Constitutional:  Positive for fatigue. Negative for chills and fever.   HENT:  Negative for congestion.    Respiratory:  Negative for cough, shortness of breath and wheezing.    Cardiovascular:  Positive for leg swelling.   Gastrointestinal:  Positive for abdominal distention. Negative for abdominal pain, diarrhea and nausea.   Genitourinary:  Positive for scrotal swelling. Negative for dysuria and frequency.   Musculoskeletal:  Negative for arthralgias, joint swelling and myalgias.   Skin:  Positive for rash and wound.   Neurological:  Negative for dizziness and light-headedness.   Psychiatric/Behavioral:  Positive for confusion.      Objective:     Vital Signs (Most Recent):  Temp: 97.4 °F (36.3 °C) (11/22/24 1100)  Pulse: 104 (11/22/24 1121)  Resp: 20 (11/22/24 1100)  BP: 110/72 (11/22/24 1121)  SpO2: (!) 94 % (11/22/24 1121) Vital Signs (24h Range):  Temp:  [97.3 °F (36.3 °C)-97.8 °F (36.6 °C)] 97.4 °F (36.3 °C)  Pulse:  [] 104  Resp:  [18-21] 20  SpO2:  [86 %-96 %] 94 %  BP: (100-114)/(52-72) 110/72     Weight: (!) 148 kg (326 lb 4.5 oz)  Body mass index is 43.05 kg/m².     Physical Exam  Constitutional:       General: He is not in acute distress.     Appearance: He is obese. He is ill-appearing (chronic). He is not toxic-appearing.   HENT:      Head: Normocephalic and atraumatic.   Eyes:      General: Scleral icterus present.      Extraocular Movements: Extraocular movements intact.      Conjunctiva/sclera: Conjunctivae normal.   Cardiovascular:      Rate and Rhythm: Normal rate. Rhythm irregular.      Heart sounds: Normal heart sounds.   Pulmonary:      Effort: Pulmonary effort is normal. No respiratory  distress.      Breath sounds: No wheezing.   Abdominal:      General: Bowel sounds are normal. There is distension.      Palpations: Abdomen is soft.      Tenderness: There is no abdominal tenderness.      Hernia: A hernia (umbilical and L inguinal hernia) is present.   Musculoskeletal:         General: Normal range of motion.      Right upper arm: Swelling and edema present.      Left upper arm: Swelling and edema present.      Cervical back: Normal range of motion and neck supple.      Right lower leg: Edema present.      Left lower leg: Edema present.   Skin:     General: Skin is warm and dry.      Coloration: Skin is not jaundiced.      Findings: Erythema (RUE) present.   Neurological:      General: No focal deficit present.      Mental Status: He is alert and oriented to person, place, and time. He is confused.      Comments: Asterixis   Psychiatric:         Attention and Perception: He is inattentive.         Behavior: Behavior normal.              Significant Labs: All pertinent labs within the past 24 hours have been reviewed.    Recent Labs   Lab 11/22/24  1041   WBC 8.52   RBC 2.83*   HGB 7.2*   HCT 22.6*   PLT 69*   MCV 80*   MCH 25.4*   MCHC 31.9*     Recent Labs   Lab 11/22/24  0449   CALCIUM 10.1   ALBUMIN 3.9   PROT 7.6   *   K 4.8   CO2 22*   CL 96   BUN 51*   CREATININE 6.4*   ALKPHOS 47   ALT 6*   AST 8*   BILITOT 6.8*   \  Significant Imaging: I have reviewed all pertinent imaging results/findings within the past 24 hours.

## 2024-11-22 NOTE — ASSESSMENT & PLAN NOTE
Hyperkalemia is likely due to JAE.The patients most recent potassium results are listed below.  Recent Labs     11/20/24  1349 11/20/24  2210 11/21/24  0324   K 6.1* 5.1 5.6*     Plan  - Monitor for arrhythmias with EKG and/or continuous telemetry.   - Treat the hyperkalemia with Potassium Binders.   - Monitor potassium: Every 12 hours  - The patient's hyperkalemia is stable

## 2024-11-22 NOTE — ASSESSMENT & PLAN NOTE
End Stage Liver Disease precipitated coagulopathy    Will hold all anticoagulation during admission

## 2024-11-23 LAB
ALBUMIN SERPL BCP-MCNC: 4.1 G/DL (ref 3.5–5.2)
ALP SERPL-CCNC: 48 U/L (ref 40–150)
ALT SERPL W/O P-5'-P-CCNC: 7 U/L (ref 10–44)
ANION GAP SERPL CALC-SCNC: 15 MMOL/L (ref 8–16)
AST SERPL-CCNC: 8 U/L (ref 10–40)
BACTERIA BLD CULT: ABNORMAL
BASOPHILS # BLD AUTO: 0 K/UL (ref 0–0.2)
BASOPHILS NFR BLD: 0 % (ref 0–1.9)
BILE AC SERPL-SCNC: 20 MCMOL/L
BILIRUB SERPL-MCNC: 5.6 MG/DL (ref 0.1–1)
BUN SERPL-MCNC: 58 MG/DL (ref 8–23)
CALCIUM SERPL-MCNC: 10.1 MG/DL (ref 8.7–10.5)
CHLORIDE SERPL-SCNC: 95 MMOL/L (ref 95–110)
CLINICAL BIOCHEMIST REVIEW: NORMAL
CO2 SERPL-SCNC: 20 MMOL/L (ref 23–29)
CREAT SERPL-MCNC: 6.5 MG/DL (ref 0.5–1.4)
DIFFERENTIAL METHOD BLD: ABNORMAL
EOSINOPHIL # BLD AUTO: 0 K/UL (ref 0–0.5)
EOSINOPHIL NFR BLD: 0 % (ref 0–8)
ERYTHROCYTE [DISTWIDTH] IN BLOOD BY AUTOMATED COUNT: 20.7 % (ref 11.5–14.5)
EST. GFR  (NO RACE VARIABLE): 8.5 ML/MIN/1.73 M^2
GLUCOSE SERPL-MCNC: 154 MG/DL (ref 70–110)
HCT VFR BLD AUTO: 22.8 % (ref 40–54)
HGB BLD-MCNC: 7.7 G/DL (ref 14–18)
IMM GRANULOCYTES # BLD AUTO: 0.02 K/UL (ref 0–0.04)
IMM GRANULOCYTES NFR BLD AUTO: 0.4 % (ref 0–0.5)
INR PPP: 1.7 (ref 0.8–1.2)
LYMPHOCYTES # BLD AUTO: 0.2 K/UL (ref 1–4.8)
LYMPHOCYTES NFR BLD: 4 % (ref 18–48)
MAGNESIUM SERPL-MCNC: 2.6 MG/DL (ref 1.6–2.6)
MCH RBC QN AUTO: 25.4 PG (ref 27–31)
MCHC RBC AUTO-ENTMCNC: 33.8 G/DL (ref 32–36)
MCV RBC AUTO: 75 FL (ref 82–98)
MONOCYTES # BLD AUTO: 0.1 K/UL (ref 0.3–1)
MONOCYTES NFR BLD: 2 % (ref 4–15)
NEUTROPHILS # BLD AUTO: 4.7 K/UL (ref 1.8–7.7)
NEUTROPHILS NFR BLD: 93.6 % (ref 38–73)
NRBC BLD-RTO: 0 /100 WBC
PHOSPHATE SERPL-MCNC: 6.6 MG/DL (ref 2.7–4.5)
PLATELET # BLD AUTO: 77 K/UL (ref 150–450)
PLPETH BLD-MCNC: <10 NG/ML
PMV BLD AUTO: 11.1 FL (ref 9.2–12.9)
POPETH BLD-MCNC: <10 NG/ML
POTASSIUM SERPL-SCNC: 5 MMOL/L (ref 3.5–5.1)
PROT SERPL-MCNC: 7.9 G/DL (ref 6–8.4)
PROTHROMBIN TIME: 17.6 SEC (ref 9–12.5)
RBC # BLD AUTO: 3.03 M/UL (ref 4.6–6.2)
SODIUM SERPL-SCNC: 130 MMOL/L (ref 136–145)
VANCOMYCIN SERPL-MCNC: 16.2 UG/ML
WBC # BLD AUTO: 4.97 K/UL (ref 3.9–12.7)

## 2024-11-23 PROCEDURE — 63600175 PHARM REV CODE 636 W HCPCS: Mod: JA | Performed by: STUDENT IN AN ORGANIZED HEALTH CARE EDUCATION/TRAINING PROGRAM

## 2024-11-23 PROCEDURE — 94761 N-INVAS EAR/PLS OXIMETRY MLT: CPT

## 2024-11-23 PROCEDURE — 99233 SBSQ HOSP IP/OBS HIGH 50: CPT | Mod: ,,, | Performed by: INTERNAL MEDICINE

## 2024-11-23 PROCEDURE — 63600175 PHARM REV CODE 636 W HCPCS

## 2024-11-23 PROCEDURE — 94799 UNLISTED PULMONARY SVC/PX: CPT

## 2024-11-23 PROCEDURE — 25000003 PHARM REV CODE 250

## 2024-11-23 PROCEDURE — 25000003 PHARM REV CODE 250: Performed by: INTERNAL MEDICINE

## 2024-11-23 PROCEDURE — 99900035 HC TECH TIME PER 15 MIN (STAT)

## 2024-11-23 PROCEDURE — 99232 SBSQ HOSP IP/OBS MODERATE 35: CPT | Mod: ,,, | Performed by: PHYSICIAN ASSISTANT

## 2024-11-23 PROCEDURE — 85025 COMPLETE CBC W/AUTO DIFF WBC: CPT | Performed by: INTERNAL MEDICINE

## 2024-11-23 PROCEDURE — 25000003 PHARM REV CODE 250: Performed by: STUDENT IN AN ORGANIZED HEALTH CARE EDUCATION/TRAINING PROGRAM

## 2024-11-23 PROCEDURE — 85610 PROTHROMBIN TIME: CPT | Performed by: INTERNAL MEDICINE

## 2024-11-23 PROCEDURE — 27000207 HC ISOLATION

## 2024-11-23 PROCEDURE — 63600175 PHARM REV CODE 636 W HCPCS: Performed by: INTERNAL MEDICINE

## 2024-11-23 PROCEDURE — 99291 CRITICAL CARE FIRST HOUR: CPT | Mod: ,,, | Performed by: INTERNAL MEDICINE

## 2024-11-23 PROCEDURE — 80202 ASSAY OF VANCOMYCIN: CPT | Performed by: STUDENT IN AN ORGANIZED HEALTH CARE EDUCATION/TRAINING PROGRAM

## 2024-11-23 PROCEDURE — 83735 ASSAY OF MAGNESIUM: CPT | Performed by: INTERNAL MEDICINE

## 2024-11-23 PROCEDURE — 25000003 PHARM REV CODE 250: Performed by: PHYSICIAN ASSISTANT

## 2024-11-23 PROCEDURE — 20000000 HC ICU ROOM

## 2024-11-23 PROCEDURE — 80053 COMPREHEN METABOLIC PANEL: CPT | Performed by: INTERNAL MEDICINE

## 2024-11-23 PROCEDURE — 84100 ASSAY OF PHOSPHORUS: CPT | Performed by: INTERNAL MEDICINE

## 2024-11-23 RX ORDER — FUROSEMIDE 10 MG/ML
120 INJECTION INTRAMUSCULAR; INTRAVENOUS 2 TIMES DAILY
Status: DISCONTINUED | OUTPATIENT
Start: 2024-11-23 | End: 2024-11-25

## 2024-11-23 RX ORDER — METOPROLOL TARTRATE 25 MG/1
25 TABLET, FILM COATED ORAL 2 TIMES DAILY
Status: DISCONTINUED | OUTPATIENT
Start: 2024-11-23 | End: 2024-11-23

## 2024-11-23 RX ORDER — PANTOPRAZOLE SODIUM 40 MG/1
40 TABLET, DELAYED RELEASE ORAL
Status: DISCONTINUED | OUTPATIENT
Start: 2024-11-23 | End: 2024-11-25

## 2024-11-23 RX ORDER — PHENYLEPHRINE HCL IN 0.9% NACL 20MG/250ML
0-3 PLASTIC BAG, INJECTION (ML) INTRAVENOUS CONTINUOUS
Status: DISCONTINUED | OUTPATIENT
Start: 2024-11-23 | End: 2024-11-24

## 2024-11-23 RX ADMIN — MIDODRINE HYDROCHLORIDE 10 MG: 5 TABLET ORAL at 08:11

## 2024-11-23 RX ADMIN — FUROSEMIDE 120 MG: 10 INJECTION, SOLUTION INTRAVENOUS at 08:11

## 2024-11-23 RX ADMIN — Medication 3 MCG/KG/MIN: at 11:11

## 2024-11-23 RX ADMIN — Medication 250 MCG: at 09:11

## 2024-11-23 RX ADMIN — Medication 3 MCG/KG/MIN: at 07:11

## 2024-11-23 RX ADMIN — Medication 0.5 MCG/KG/MIN: at 01:11

## 2024-11-23 RX ADMIN — VANCOMYCIN HYDROCHLORIDE 500 MG: 500 INJECTION, POWDER, LYOPHILIZED, FOR SOLUTION INTRAVENOUS at 09:11

## 2024-11-23 RX ADMIN — OCTREOTIDE ACETATE 50 MCG/HR: 500 INJECTION, SOLUTION INTRAVENOUS; SUBCUTANEOUS at 07:11

## 2024-11-23 RX ADMIN — HYDROCORTISONE SODIUM SUCCINATE 100 MG: 100 INJECTION, POWDER, FOR SOLUTION INTRAMUSCULAR; INTRAVENOUS at 06:11

## 2024-11-23 RX ADMIN — Medication 2.5 MCG/KG/MIN: at 04:11

## 2024-11-23 RX ADMIN — Medication 3 MCG/KG/MIN: at 08:11

## 2024-11-23 RX ADMIN — HYDROCORTISONE SODIUM SUCCINATE 100 MG: 100 INJECTION, POWDER, FOR SOLUTION INTRAMUSCULAR; INTRAVENOUS at 01:11

## 2024-11-23 RX ADMIN — OCTREOTIDE ACETATE 50 MCG/HR: 500 INJECTION, SOLUTION INTRAVENOUS; SUBCUTANEOUS at 04:11

## 2024-11-23 RX ADMIN — MUPIROCIN: 20 OINTMENT TOPICAL at 08:11

## 2024-11-23 RX ADMIN — PANTOPRAZOLE SODIUM 40 MG: 40 TABLET, DELAYED RELEASE ORAL at 03:11

## 2024-11-23 RX ADMIN — HYDROCORTISONE SODIUM SUCCINATE 100 MG: 100 INJECTION, POWDER, FOR SOLUTION INTRAMUSCULAR; INTRAVENOUS at 09:11

## 2024-11-23 RX ADMIN — FLUDROCORTISONE ACETATE 100 MCG: 0.1 TABLET ORAL at 09:11

## 2024-11-23 RX ADMIN — OXYCODONE HYDROCHLORIDE AND ACETAMINOPHEN 500 MG: 500 TABLET ORAL at 09:11

## 2024-11-23 RX ADMIN — Medication 3 MCG/KG/MIN: at 09:11

## 2024-11-23 RX ADMIN — PANTOPRAZOLE SODIUM 40 MG: 40 INJECTION, POWDER, LYOPHILIZED, FOR SOLUTION INTRAVENOUS at 09:11

## 2024-11-23 RX ADMIN — FUROSEMIDE 120 MG: 10 INJECTION, SOLUTION INTRAVENOUS at 10:11

## 2024-11-23 RX ADMIN — Medication 2 MCG/KG/MIN: at 03:11

## 2024-11-23 RX ADMIN — NOREPINEPHRINE BITARTRATE 0.12 MCG/KG/MIN: 16 SOLUTION INTRAVENOUS at 04:11

## 2024-11-23 RX ADMIN — MUPIROCIN: 20 OINTMENT TOPICAL at 09:11

## 2024-11-23 RX ADMIN — NOREPINEPHRINE BITARTRATE 0.12 MCG/KG/MIN: 16 SOLUTION INTRAVENOUS at 08:11

## 2024-11-23 RX ADMIN — MIDODRINE HYDROCHLORIDE 10 MG: 5 TABLET ORAL at 09:11

## 2024-11-23 RX ADMIN — METOPROLOL TARTRATE 25 MG: 25 TABLET, FILM COATED ORAL at 10:11

## 2024-11-23 RX ADMIN — PHYTONADIONE 10 MG: 10 INJECTION, EMULSION INTRAMUSCULAR; INTRAVENOUS; SUBCUTANEOUS at 10:11

## 2024-11-23 RX ADMIN — Medication 2.5 MCG/KG/MIN: at 07:11

## 2024-11-23 RX ADMIN — Medication 2.5 MCG/KG/MIN: at 06:11

## 2024-11-23 RX ADMIN — TAMSULOSIN HYDROCHLORIDE 0.4 MG: 0.4 CAPSULE ORAL at 08:11

## 2024-11-23 RX ADMIN — LACTULOSE 20 G: 20 SOLUTION ORAL at 08:11

## 2024-11-23 RX ADMIN — MIDODRINE HYDROCHLORIDE 10 MG: 5 TABLET ORAL at 03:11

## 2024-11-23 RX ADMIN — LACTULOSE 20 G: 20 SOLUTION ORAL at 03:11

## 2024-11-23 RX ADMIN — Medication 2.5 MCG/KG/MIN: at 05:11

## 2024-11-23 RX ADMIN — CHLORHEXIDINE GLUCONATE 0.12% ORAL RINSE 15 ML: 1.2 LIQUID ORAL at 08:11

## 2024-11-23 RX ADMIN — NOREPINEPHRINE BITARTRATE 0.18 MCG/KG/MIN: 16 SOLUTION INTRAVENOUS at 11:11

## 2024-11-23 RX ADMIN — LACTULOSE 20 G: 20 SOLUTION ORAL at 09:11

## 2024-11-23 RX ADMIN — Medication 3 MCG/KG/MIN: at 10:11

## 2024-11-23 RX ADMIN — CHLORHEXIDINE GLUCONATE 0.12% ORAL RINSE 15 ML: 1.2 LIQUID ORAL at 09:11

## 2024-11-23 NOTE — SUBJECTIVE & OBJECTIVE
Interval History:   No acute events overnight  Afebrile and white blood cell count normal  Blood cultures with lisinopril bacillus and micrococcus  Repeat blood cultures no growth to date  Denies fever, chills, sweats    Review of Systems   Constitutional:  Negative for chills, diaphoresis and fever.   Respiratory:  Negative for shortness of breath.    Cardiovascular:  Negative for chest pain.   Gastrointestinal:  Negative for abdominal pain, diarrhea, nausea and vomiting.   Genitourinary:  Negative for dysuria and hematuria.   Skin:  Positive for color change.     Objective:     Vital Signs (Most Recent):  Temp: 97.8 °F (36.6 °C) (11/23/24 1600)  Pulse: 93 (11/23/24 1718)  Resp: 15 (11/23/24 1718)  BP: (!) 105/52 (11/23/24 1718)  SpO2: 95 % (11/23/24 1718) Vital Signs (24h Range):  Temp:  [97.5 °F (36.4 °C)-98.6 °F (37 °C)] 97.8 °F (36.6 °C)  Pulse:  [] 93  Resp:  [10-38] 15  SpO2:  [91 %-98 %] 95 %  BP: ()/(50-67) 105/52     Weight: (!) 148 kg (326 lb 4.5 oz)  Body mass index is 43.05 kg/m².    Estimated Creatinine Clearance: 15.8 mL/min (A) (based on SCr of 6.5 mg/dL (H)).     Physical Exam  Vitals and nursing note reviewed.   Constitutional:       General: He is not in acute distress.     Appearance: He is not ill-appearing, toxic-appearing or diaphoretic.   HENT:      Right Ear: There is no impacted cerumen.      Nose: No congestion.      Mouth/Throat:      Pharynx: No oropharyngeal exudate.   Eyes:      General: No scleral icterus.  Cardiovascular:      Rate and Rhythm: Normal rate.      Pulses: Normal pulses.      Heart sounds: Normal heart sounds.   Pulmonary:      Effort: No respiratory distress.      Breath sounds: Normal breath sounds.   Abdominal:      General: Bowel sounds are normal. There is distension.      Palpations: Abdomen is soft.      Tenderness: There is no abdominal tenderness.   Musculoskeletal:         General: Swelling and tenderness present.      Cervical back: Neck supple. No  tenderness.      Right lower leg: Edema present.      Left lower leg: Edema present.   Skin:     General: Skin is warm and dry.      Coloration: Skin is not jaundiced.      Findings: Bruising present. No erythema or rash.   Neurological:      Mental Status: He is oriented to person, place, and time. Mental status is at baseline.   Psychiatric:         Behavior: Behavior normal.         Thought Content: Thought content normal.          Significant Labs: Blood Culture:   Recent Labs   Lab 11/20/24  1429 11/20/24  1444 11/22/24  0903 11/22/24  0904   LABBLOO Gram stain aer bottle: Gram positive cocci in clusters resembling Staph  Results called to and read back by:Crystal Navarrete RN 11/21/2024  23:02  MICROCOCCUS LUTEUS* Gram stain aer bottle: Gram positive rods  Results called to and read back by:Crystal Navarrete RN 11/21/2024  21:58  GRAM-POSITIVE JANE  further identified as Lysinobacillus species  * No Growth to date  No Growth to date No Growth to date  No Growth to date     CBC:   Recent Labs   Lab 11/22/24 0449 11/22/24  1041 11/23/24  0358   WBC 15.71* 8.52 4.97   HGB 7.2* 7.2* 7.7*   HCT 21.5* 22.6* 22.8*   PLT 72* 69* 77*     CMP:   Recent Labs   Lab 11/21/24 2016 11/22/24  0449 11/23/24  0358   * 132* 130*   K 5.0 4.8 5.0   CL 95 96 95   CO2 22* 22* 20*   * 95 154*   BUN 52* 51* 58*   CREATININE 6.3* 6.4* 6.5*   CALCIUM 9.9 10.1 10.1   PROT  --  7.6 7.9   ALBUMIN  --  3.9 4.1   BILITOT  --  6.8* 5.6*   ALKPHOS  --  47 48   AST  --  8* 8*   ALT  --  6* 7*   ANIONGAP 13 14 15     Urine Culture:   Recent Labs   Lab 10/05/24  0040 11/20/24  1801   LABURIN COAGULASE-NEGATIVE STAPHYLOCOCCUS SPECIES  50,000 - 99,999 cfu/ml  Susceptibility testing not routinely performed.  * COAGULASE-NEGATIVE STAPHYLOCOCCUS SPECIES  10,000 - 49,999 cfu/ml  Susceptibility testing not routinely performed.  *     Urine Studies:   Recent Labs   Lab 11/20/24  1801   COLORU Yellow   APPEARANCEUA Hazy*   PHUR 6.0  "  SPECGRAV 1.015   PROTEINUA Trace*   GLUCUA Negative   KETONESU Negative   BILIRUBINUA Negative   OCCULTUA Negative   NITRITE Negative   LEUKOCYTESUR 3+*   RBCUA 4   WBCUA >100*   SQUAMEPITHEL 0   HYALINECASTS 4*     Wound Culture: No results for input(s): "LABAERO" in the last 4320 hours.  All pertinent labs within the past 24 hours have been reviewed.    Significant Imaging: I have reviewed all pertinent imaging results/findings within the past 24 hours.  Procedure Component Value Units Date/Time   IR US Abdomen Limited [[id number]] Resulted: 11/21/24 1554   Order Status: Completed Updated: 11/21/24 1556   Narrative:     EXAMINATION:  History: Abdominal distention    Procedure: Limited ultrasound    CPT 4: 12057    Findings:    Limited ultrasound of the abdomen and pelvis demonstrated no significant fluid for diagnostic or therapeutic paracentesis.   Impression:       Limited ultrasound of the abdomen and pelvis demonstrated no significant fluid for diagnostic or therapeutic paracentesis.    _______________________________________________________________    Electronically signed by resident: Lila Floyd  Date: 11/21/2024  Time: 15:35    Electronically signed by: Carlito Her  Date: 11/21/2024  Time: 15:54   US Liver with Doppler (xpd) [[id number]] Resulted: 11/21/24 1118   Order Status: Completed Updated: 11/21/24 1120   Narrative:     EXAMINATION:  US LIVER WITH DOPPLER    CLINICAL HISTORY:  decompensated liver cirrhosis;    TECHNIQUE:  Liver Doppler ultrasound was performed.    COMPARISON:  U/S abdomen 01/11/2024.  MRI abdomen 03/24/2024.    FINDINGS:  Liver: Normal in size, measuring 14.3 cm. Heterogeneous echotexture and nodular contour in keeping with cirrhosis.  Subcentimeter cyst in the left lobe.    Hepatic vasculature:Portal veins are patent with proper directional flow.  The splenic and superior mesenteric veins are obscured at the portal confluence.  The splenic vein is patent at the hilum.  Hepatic " veins and IVC are patent with proper directional flow. Hepatic arteries are patent with normal waveforms. No upper abdominal venous collaterals.    Biliary: Gallbladder surgically absent.  No biliary ductal dilatation. CBD measures 4 mm.    Spleen: Enlarged, measuring 12.7 cm. Homogeneous parenchymal echotexture.    Pancreas: Visualized portions demonstrate normal contours.    Miscellaneous: Trace ascites.  Left pleural effusion.   Impression:       Cirrhosis with signs of portal hypertension including splenomegaly and ascites.    Patent hepatic vasculature.    Left pleural effusion.    Electronically signed by resident: Allen Lagos  Date: 11/21/2024  Time: 10:30    Electronically signed by: Enrique Florence  Date: 11/21/2024  Time: 11:18   US Retroperitoneal Complete [[id number]] Resulted: 11/21/24 1114   Order Status: Completed Updated: 11/21/24 1117   Narrative:     EXAMINATION:  US RETROPERITONEAL COMPLETE    CLINICAL HISTORY:  JAE on CKD;    TECHNIQUE:  Ultrasound of the kidneys and urinary bladder was performed including color flow and Doppler evaluation of the kidneys.    COMPARISON:  MRI abdomen 03/24/2024.    FINDINGS:  Right kidney: The right kidney measures 12.5 cm.  Cortical thinning.  Resistive index measures 1.0.  Decreased color Doppler flow.  Subcentimeter cyst in the lower pole.  No solid mass. No renal stone. No hydronephrosis.    Left kidney: The left kidney measures 12.0 cm.  Cortical thinning.  Resistive index measures 1.0.  Decreased color Doppler flow.    No solid mass. Echogenic focus in the upper pole measuring 6 mm.  No hydronephrosis.    Bladder is decompressed over a Boo catheter.    Splenic resistive index: 0.61.    Left pleural effusion.   Impression:       Bilateral CKD.    Questionable 6 mm nonobstructing stone in the left upper pole.    No hydronephrosis or solid renal mass.    Electronically signed by resident: Allen Lagos  Date: 11/21/2024  Time: 10:36    Electronically signed by:  Enrique Florence  Date: 11/21/2024  Time: 11:14   US Upper Extremity Veins Right [[id number]] Resulted: 11/20/24 1616   Order Status: Completed Updated: 11/20/24 1618   Narrative:     EXAMINATION:  US UPPER EXTREMITY VEINS RIGHT    CLINICAL HISTORY:  Right upper extremity swelling;    TECHNIQUE:  Duplex and color flow Doppler evaluation and dynamic compression was performed of the right upper extremity veins.    COMPARISON:  None    FINDINGS:  Central veins: The internal jugular, subclavian, and axillary veins are patent and free of thrombus.    Arm veins: The brachial, basilic, and cephalic veins are patent and compressible.    Contralateral subclavian/internal jugular veins: The left subclavian and internal jugular veins are patent and free of thrombus.    Other findings: None.   Impression:       No thrombus in central veins of the right upper extremity.      Electronically signed by: Lennox Garsia  Date: 11/20/2024  Time: 16:16   X-Ray Chest AP Portable [[id number]] Resulted: 11/20/24 1436   Order Status: Completed Updated: 11/20/24 1438   Narrative:     EXAMINATION:  XR CHEST AP PORTABLE    CLINICAL HISTORY:  Shortness of breath    TECHNIQUE:  Single frontal view of the chest was performed.    COMPARISON:  October 4, 2024    FINDINGS:  Portable technique combined with overlying soft tissues partly degrades image quality in the lower chest.    Cardiac size is enlarged similar to prior.  No large volume of pleural fluid noted although there is mild blunting of the right costophrenic angle which may relate to a small amount of pleural fluid.  Suspected right basilar opacity, to be correlated clinically for infection.  Two view chest could be helpful for better assessment.   Impression:       As above      Electronically signed by: Velma Buckley MD  Date: 11/20/2024  Time: 14:36      Imaging History     2024    Date Procedure Name Study Review Link PACS Link Status Accession Number Location   11/23/24 01:32 PM Cardiac  monitoring strips Study Review  Final     11/22/24 10:26 PM Cardiac monitoring strips Study Review  Final     11/22/24 06:05 PM Cardiac monitoring strips Study Review  Final     11/21/24 05:46 PM Cardiac monitoring strips Study Review  Final     11/21/24 02:03 PM IR US Abdomen Limited Study Review  Images Final [id number] [facility]   11/21/24 10:17 AM US Retroperitoneal Complete Study Review  Images Final [id number] [facility]   11/21/24 10:17 AM US Liver with Doppler (xpd) Study Review  Images Final [id number] [facility]   11/21/24 08:41 AM Echo Study Review  Images Final [id number] [facility]   11/20/24 10:28 PM Cardiac monitoring strips Study Review  Final     11/20/24 04:13 PM US Upper Extremity Veins Right Study Review  Images Final [id number] [facility]   11/20/24 02:05 PM X-Ray Chest AP Portable Study Review  Images Final [id number] [facility]

## 2024-11-23 NOTE — PROGRESS NOTES
[facility] - Medical [facility]  Nephrology  Progress Note    Patient Name: Juan Carlos Yoo Sr.  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 Recent admission 10/4 at [facility] for decompensated hepatic cirrhosis where he was discharged with oral diuretic regimen including furosemide 60 mg BID and metolazone 2.5 mg daily, low salt diet with fluid restriction. It is unclear if patient is adherent to these medications or lifestyle modifications. W/u notable for anemia with hb 6.7 s/p 1 unit pRBC 11/20 and JAE on CKD w elevated BUN 49/creatinine 5.9, hyperkalemia (K 6.1>5.1 after shifting), bicarb 18, elevated lactate up to 3.5. Nephrology consulted for JAE with c/o HRS    Interval History: NAEON, stepped up to ICU for pressors, good UOP, alert and oriented, still +2 edema in lower extremities, on room air.    Review of patient's allergies indicates:  No Known Allergies  Current Facility-Administered Medications   Medication Frequency    albuterol-ipratropium 2.5 mg-0.5 mg/3 mL nebulizer solution 3 mL Q6H PRN    aluminum-magnesium hydroxide-simethicone 200-200-20 mg/5 mL suspension 30 mL QID PRN    ascorbic acid (vitamin C) tablet 500 mg Daily    cefTRIAXone injection 2 g Q24H    chlorhexidine 0.12 % solution 15 mL BID    cyanocobalamin tablet 250 mcg Daily    dextrose 10% bolus 125 mL 125 mL PRN    dextrose 10% bolus 250 mL 250 mL PRN    fludrocortisone tablet 100 mcg Daily    furosemide injection 120 mg Once    furosemide injection  120 mg BID    glucagon (human recombinant) injection 1 mg PRN    glucose chewable tablet 16 g PRN    glucose chewable tablet 24 g PRN    hydrocortisone sodium succinate injection 100 mg Q8H    lactulose 20 gram/30 mL solution Soln 20 g TID    melatonin tablet 6 mg Nightly PRN    midodrine tablet 10 mg TID    mupirocin 2 % ointment BID    naloxone 0.4 mg/mL injection 0.02 mg PRN    octreotide (SANDOSTATIN) 500 mcg in 0.9% NaCl 100 mL infusion Continuous    ondansetron injection 4 mg Q8H PRN    pantoprazole EC tablet 40 mg BID AC    PHENYLephrine (ELIZABETH-SYNEPHRINE) 20 mg/250 mL infusion (PERIPHERAL access) Continuous    phytonadione vitamin k (AQUA-MEPHYTON) 10 mg in D5W 50 mL IVPB Daily    simethicone chewable tablet 80 mg QID PRN    sodium chloride 0.9% flush 10 mL Q12H PRN    sodium chloride 0.9% flush 10 mL PRN    tamsulosin 24 hr capsule 0.4 mg QHS    vancomycin - pharmacy to dose pharmacy to manage frequency       Objective:     Vital Signs (Most Recent):  Temp: 98.1 °F (36.7 °C) (11/23/24 0300)  Pulse: (!) 118 (11/23/24 1016)  Resp: 14 (11/23/24 0930)  BP: 137/67 (11/23/24 1016)  SpO2: 97 % (11/23/24 0930) Vital Signs (24h Range):  Temp:  [97.5 °F (36.4 °C)-98.1 °F (36.7 °C)] 98.1 °F (36.7 °C)  Pulse:  [] 118  Resp:  [14-38] 14  SpO2:  [91 %-98 %] 97 %  BP: ()/(52-68) 137/67     Weight: (!) 148 kg (326 lb 4.5 oz) (11/22/24 0400)  Body mass index is 43.05 kg/m².  Body surface area is 2.76 meters squared.    I/O last 3 completed shifts:  In: 736.8 [I.V.:412.3; Blood:283; IV Piggyback:41.5]  Out: 1050 [Urine:1050]     Physical Exam  Constitutional:       Appearance: He is obese.   Pulmonary:      Effort: Pulmonary effort is normal. No respiratory distress.      Breath sounds: Normal breath sounds.   Musculoskeletal:         General: Swelling present.      Right lower leg: Edema present.      Left lower leg: Edema present.   Neurological:      General: No focal deficit present.      Mental Status: He is  alert and oriented to person, place, and time.          Significant Labs:  BMP:   Recent Labs   Lab 11/23/24  0358   *   *   K 5.0   CL 95   CO2 20*   BUN 58*   CREATININE 6.5*   CALCIUM 10.1   MG 2.6     CBC:   Recent Labs   Lab 11/23/24  0358   WBC 4.97   RBC 3.03*   HGB 7.7*   HCT 22.8*   PLT 77*   MCV 75*   MCH 25.4*   MCHC 33.8        Significant Imaging:  Labs: Reviewed  ECG: Reviewed  X-Ray: Reviewed  US: Reviewed  Assessment/Plan:     Renal/  Stage 3a chronic kidney disease  Creatinine stable for now. BMP reviewed- noted Estimated Creatinine Clearance: 15.8 mL/min (A) (based on SCr of 6.5 mg/dL (H)). according to latest data. Based on current GFR, CKD stage is stage 3 - GFR 30-59.  Monitor UOP and serial BMP and adjust therapy as needed. Renally dose meds. Avoid nephrotoxic medications and procedures. See JAE (acute kidney injury).      JAE (acute kidney injury)  JAE is likely due to pre-renal azotemia due to intravascular volume depletion likely secondary to decompensated hepatic cirrhosis with volume overload. Hx of CKD3a with baseline creatinine is  1.2-1.4 . Most recent creatinine and eGFR are listed below.  Recent Labs     11/21/24 2016 11/22/24 0449 11/23/24  0358   CREATININE 6.3* 6.4* 6.5*   EGFRNORACEVR 8.8* 8.7* 8.5*       Plan/Recommendations;  Keep the MAP goals > 80, continue Levophed  Lasix 120mg IV BID for today, will reassess him for additional doses tomorrow    - Avoid nephrotoxins and renally dose meds for GFR listed above  - Monitor urine output, serial BMP, and adjust therapy as needed  - Retroperitoneal ultrasound c/w bilateral CKD, showed questionable 6 mm nonobstructing stone in the left upper pole without evidence of hydronephrosis or solid renal mass.          Thank you for your consult. I will follow-up with patient. Please contact us if you have any additional questions.    Betty Terrazas MD  Nephrology  [facility] - Medical ICU

## 2024-11-23 NOTE — NURSING
Contacted team 1 due to pt not at MAP goal and infusion rate is 222 ml/hr. Stated he would contact Dr. Saha and call me back.

## 2024-11-23 NOTE — ASSESSMENT & PLAN NOTE
JAE is likely due to pre-renal azotemia due to intravascular volume depletion likely secondary to decompensated hepatic cirrhosis with volume overload. Hx of CKD3a with baseline creatinine is  1.2-1.4 . Most recent creatinine and eGFR are listed below.  Recent Labs     11/21/24 2016 11/22/24  0449 11/23/24  0358   CREATININE 6.3* 6.4* 6.5*   EGFRNORACEVR 8.8* 8.7* 8.5*       Plan/Recommendations;  Keep the MAP goals > 80, continue Levophed  Lasix 120mg IV BID for today, will reassess him for additional doses tomorrow    - Avoid nephrotoxins and renally dose meds for GFR listed above  - Monitor urine output, serial BMP, and adjust therapy as needed  - Retroperitoneal ultrasound c/w bilateral CKD, showed questionable 6 mm nonobstructing stone in the left upper pole without evidence of hydronephrosis or solid renal mass.

## 2024-11-23 NOTE — SUBJECTIVE & OBJECTIVE
Interval History/Significant Events: NAEON    Review of Systems   Constitutional:  Positive for fatigue. Negative for chills and fever.   HENT:  Negative for congestion.    Respiratory:  Negative for cough, shortness of breath and wheezing.    Cardiovascular:  Positive for leg swelling.   Gastrointestinal:  Positive for abdominal distention. Negative for abdominal pain, diarrhea and nausea.   Genitourinary:  Positive for scrotal swelling. Negative for dysuria and frequency.   Musculoskeletal:  Negative for arthralgias, joint swelling and myalgias.   Skin:  Positive for rash and wound.   Neurological:  Negative for dizziness and light-headedness.   Psychiatric/Behavioral:  Negative for confusion.      Objective:     Vital Signs (Most Recent):  Temp: 98.1 °F (36.7 °C) (11/23/24 0300)  Pulse: (!) 112 (11/23/24 0600)  Resp: (!) 21 (11/23/24 0600)  BP: (!) 122/58 (11/23/24 0600)  SpO2: 97 % (11/23/24 0600) Vital Signs (24h Range):  Temp:  [97.4 °F (36.3 °C)-98.1 °F (36.7 °C)] 98.1 °F (36.7 °C)  Pulse:  [] 112  Resp:  [16-27] 21  SpO2:  [91 %-97 %] 97 %  BP: ()/(52-72) 122/58   Weight: (!) 148 kg (326 lb 4.5 oz)  Body mass index is 43.05 kg/m².      Intake/Output Summary (Last 24 hours) at 11/23/2024 0836  Last data filed at 11/23/2024 0520  Gross per 24 hour   Intake 453.79 ml   Output 1050 ml   Net -596.21 ml          Physical Exam  Constitutional:       General: He is not in acute distress.     Appearance: He is obese. He is ill-appearing (chronic). He is not toxic-appearing.   HENT:      Head: Normocephalic and atraumatic.   Eyes:      General: Scleral icterus present.      Extraocular Movements: Extraocular movements intact.      Conjunctiva/sclera: Conjunctivae normal.   Cardiovascular:      Rate and Rhythm: Normal rate. Rhythm irregular.      Heart sounds: Normal heart sounds.   Pulmonary:      Effort: Pulmonary effort is normal. No respiratory distress.      Breath sounds: No wheezing.   Abdominal:       General: Bowel sounds are normal. There is distension.      Palpations: Abdomen is soft.      Tenderness: There is no abdominal tenderness.      Hernia: A hernia (umbilical and L inguinal hernia) is present.   Musculoskeletal:         General: Normal range of motion.      Right upper arm: Swelling and edema present.      Left upper arm: Swelling and edema present.      Cervical back: Normal range of motion and neck supple.      Right lower leg: Edema present.      Left lower leg: Edema present.   Skin:     General: Skin is warm and dry.      Coloration: Skin is not jaundiced.      Findings: Erythema (RUE) present.   Neurological:      General: No focal deficit present.      Mental Status: He is alert and oriented to person, place, and time. He is confused.      Comments: [employer]   Psychiatric:         Attention and Perception: He is inattentive.         Behavior: Behavior normal.            Vents:     Lines/Drains/Airways       Drain  Duration                  Urethral Catheter 11/20/24 1802 Double-lumen 2 days              Peripheral Intravenous Line  Duration                  Peripheral IV - Single Lumen 11/21/24 2300 20 G Right Breast 1 day         Peripheral IV - Single Lumen 11/22/24 1159 22 G Anterior;Right Upper Arm <1 day                  Significant Labs:    CBC/Anemia Profile:  Recent Labs   Lab 11/22/24 0449 11/22/24  1041 11/23/24  0358   WBC 15.71* 8.52 4.97   HGB 7.2* 7.2* 7.7*   HCT 21.5* 22.6* 22.8*   PLT 72* 69* 77*   MCV 76* 80* 75*   RDW 20.5* 21.4* 20.7*   RETIC 2.4*  --   --         Chemistries:  Recent Labs   Lab 11/21/24 2016 11/22/24  0449 11/23/24  0358   * 132* 130*   K 5.0 4.8 5.0   CL 95 96 95   CO2 22* 22* 20*   BUN 52* 51* 58*   CREATININE 6.3* 6.4* 6.5*   CALCIUM 9.9 10.1 10.1   ALBUMIN  --  3.9 4.1   PROT  --  7.6 7.9   BILITOT  --  6.8* 5.6*   ALKPHOS  --  47 48   ALT  --  6* 7*   AST  --  8* 8*   MG  --  2.6 2.6   PHOS  --  6.1* 6.6*       CMP:   Recent Labs   Lab  11/21/24 2016 11/22/24  0449 11/23/24  0358   * 132* 130*   K 5.0 4.8 5.0   CL 95 96 95   CO2 22* 22* 20*   * 95 154*   BUN 52* 51* 58*   CREATININE 6.3* 6.4* 6.5*   CALCIUM 9.9 10.1 10.1   PROT  --  7.6 7.9   ALBUMIN  --  3.9 4.1   BILITOT  --  6.8* 5.6*   ALKPHOS  --  47 48   AST  --  8* 8*   ALT  --  6* 7*   ANIONGAP 13 14 15       Significant Imaging:  I have reviewed all pertinent imaging results/findings within the past 24 hours.

## 2024-11-23 NOTE — ASSESSMENT & PLAN NOTE
Baseline creatinine is 1.5    Cr trend 6.3 > 6.4     Plan  - JAE is worsening. Will continue current treatment  - Avoid nephrotoxins and renally dose meds for GFR listed above  - Monitor urine output, serial BMP, and adjust therapy as needed     - Etiology includes volume overload, obstruction, hypotension, possible HRS  - monitor peacock catheter and monitor strict I&O  - was started on midodrine and albumin.  Octreotide started 11/22/2024.  -appreciate nephrology recommendations and recommendation to give 1 dose of IV Lasix 120 mg.   - ICU consulted for Levo administration with MAP goal > 85 . Peripheral Levophed ordered, titration difficult due to tachycardic response, will continue attempting to meet MAP goal however this may be difficult given the patient has Afib.  - Stress dosing with Fludrocortisone and Hydrocortisone as HRS patients tend to have AI

## 2024-11-23 NOTE — ASSESSMENT & PLAN NOTE
I have reviewed hospital notes from  [facility] service and other specialty providers. I have also reviewed CBC, CMP/BMP,  cultures and imaging with my interpretation as documented.      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 ID consulted for discordant positive bld cxs.     Pt c/o worsening diffuse swelling Rt arm with pain and erythema after bumping on door frame few days ago; no grossly c/f cellulitis or SSTI. RUE U/S neg for DVT.  Index bld cxs 11/20 with discordant organism, 1 set with lisinopril bacillus, the other with GPCs, micrococcus lluteus. MS/MRSA PCR negative, BCID negative. Suspect positive bld cxs represent contaminant. Repeat bld cxs sent 11/22 and are negative. TTE neg for IE or veg.    Ucx +coag-neg staph; though without urinary sxs and low colony count, suspect not clinically relevant.  RP-U/S neg for  anomaly, no stones or hydronephrosis. Pt with abd distension, but no Abd pain or tenderness on exam. Abd U/S did not reveal significant  fluid for paracentesis; only trace ascites  (IR w/o safe window) and small left pleural effusion as noted on chest-XR, with Rt basilar opacity. Pt denies cough, but endorses SOB on exertion which has been ongoing.      Pt on empiric IV-vanc and ceftriaxone.  Patient appears without any systemic signs of infection and denies any prior to admit or currently.    Recommendations / Plan:  Given blood cultures are suspected as contaminants, recommend DC empiric vanc and ceftriaxone  Continue to monitor off antibiotics  Id will sign off but reconsult with any concerns

## 2024-11-23 NOTE — PROGRESS NOTES
[facility] - Medical ICU  Critical Care Medicine  Progress Note    Patient Name: Juan Carlos Yoo Sr.  MRN: [id number]  Admission Date: 11/20/2024  Hospital Length of Stay: 3 days  Code Status: Full Code  Attending Provider: Aditya Saha MD  Primary Care Provider: Nyla Rios FNP   Principal Problem: Decompensated cirrhosis    Subjective:     HPI:  Juan Carlos Yoo is a 69 male with PMH of alcoholic cirrhosis, esophageal varices, Afib on eliquis, and HTN who presents for c/o worsening diffuse swelling as well as R arm pain/erythema. He was recently hospitalized 1 month ago at [facility] for volume overload in the setting of decompensated cirrhosis. He was treated with IV diuresis and discharged with adjustment to his diuretics, currently on lasix 60mg BID and metolazone 2.5mg every other week as per family. Patient reports diffuse swelling of his upper and lower extremities in addition to distended abdomen and worsening dyspnea, which he believes is due to recent medication adjustment. Family states he is non-compliant with low sodium diet and fluid restriction. Family states he has been compliant with diuretics, but rom't taken eliquis for 3 days due to issue getting refill. He also reports scratching right arm on door frame 3-4 days ago which has become red and painful after wrapping in in bandage. He claims to be compliant with medications, but is a poor historian. He follows with hepatology, not currently active on transplant list. He states he hasn't consumed alcohol for at least 9 months. Has c/o chills, but denies fever, cough, nausea, vomiting, chest pain, dysuria, hematuria, blood in stool. Workup in ED remarkable for VS: T 97.6 HR 94 RR 22 BP 95/56 O2 sat 97% on RA. Hb 6.7, MCV 75, Plt 81, PT/INR 22.6/2.2, Na 128, K 6.1, BUN/Cr 49/5.7, Alb 2.8, AST/ALT 35/9, Ammonia 104, , Trop neg, LA 2.9, CXR: Cardiac size is enlarged similar to prior.  No large volume of pleural fluid noted although there is mild  blunting of the right costophrenic angle which may relate to a small amount of pleural fluid.  Suspected right basilar opacity, to be correlated clinically for infection. RUE venous doppler: No thrombus in central veins of the right upper extremity. Patient received vanc/zosyn and lasix 80mg IVP in ED and will be admitted to hospital medicine service for further management.     Pt was admitted to hospital medicine. Blood cultures positive for Gram-positive cocci and Gram-negative rods. ID has been consulted. S/p 2 units PRBC for Hemoglobin dropped 6.8 on 11/21. Pt was on Eliquis for atrial fibrillation prior to admission which was held.  Hepatology following patient's clinical course, but are not evaluating him for transplant at this time. Diuretics were held because of concern for HRS.  Patient was started on albumin and midodrine per Nephrology recommendations for HRS.  Renal function continued to worsen and recommendation per Nephrology to transfer to ICU for maintaining goal map over 85 on Levophed.  ICU was notified and patient to be assessed to be transferred to ICU.       Hospital/ICU Course:  69 y.o. male with history of EtOH use disorder, EtOH cirrhosis, HCC s/p y90, morbid obesity BMI 44, HTN, and Afib who presents with severe anasarca and JAE.      Appreciate hepatology and nephrology recommendations.  Diuretics were held because of concern for HRS.  Patient was started on albumin and midodrine per Nephrology recommendations for HRS.  Renal function continued to worsen and recommendation per Nephrology to transfer to ICU for maintaining goal map over 85 on Levophed.  ICU was notified and patient to be assessed to be transferred to ICU.     Patient also has Gram-positive cocci and Gram-negative rods in his blood now.  Possible sources could be got translocation and barrier related infection through skin as he has been significantly edematous and has been oozing.  Has been on vancomycin and Rocephin.  Id  was consulted this morning.  Repeat blood cultures were obtained.     Continues to have anemia.  Hemoglobin dropped on 11/20 and was given 1 unit of packed red blood cells.  Did not respond appropriately.  Hemoglobin dropped again to 6.8 on 11/21 and was given 1 unit of packed red blood cells.  Hemoglobin again on 11/22 is 7.2.  No reported melena or hematochezia.  Patient was on Eliquis for atrial fibrillation prior to admission which was held.  Given history of esophageal varices and portal hypertensive gastropathy whereas also could be component of slow bleeding, under production due to CKD and hemolysis while also with decompensated cirrhosis.  Started on Protonix IV b.i.d. and octreotide.     Also clarified with hepatology.  Given patient's current infection we will await ID recommendations however we will be challenging to consider transplant evaluation at this time.  Trend clinical course     Goal clarification with the patient and family.  Patient at this time wants to be full code and continue with Levophed in ICU.  They would consider discussion with palliative care in a day or so if things do not get better.     In MICU patient started on Levophed, however titration remained difficult given tachycardic response from the patient.     Interval History/Significant Events: NAEON    Review of Systems   Constitutional:  Positive for fatigue. Negative for chills and fever.   HENT:  Negative for congestion.    Respiratory:  Negative for cough, shortness of breath and wheezing.    Cardiovascular:  Positive for leg swelling.   Gastrointestinal:  Positive for abdominal distention. Negative for abdominal pain, diarrhea and nausea.   Genitourinary:  Positive for scrotal swelling. Negative for dysuria and frequency.   Musculoskeletal:  Negative for arthralgias, joint swelling and myalgias.   Skin:  Positive for rash and wound.   Neurological:  Negative for dizziness and light-headedness.   Psychiatric/Behavioral:   Negative for confusion.      Objective:     Vital Signs (Most Recent):  Temp: 98.1 °F (36.7 °C) (11/23/24 0300)  Pulse: (!) 112 (11/23/24 0600)  Resp: (!) 21 (11/23/24 0600)  BP: (!) 122/58 (11/23/24 0600)  SpO2: 97 % (11/23/24 0600) Vital Signs (24h Range):  Temp:  [97.4 °F (36.3 °C)-98.1 °F (36.7 °C)] 98.1 °F (36.7 °C)  Pulse:  [] 112  Resp:  [16-27] 21  SpO2:  [91 %-97 %] 97 %  BP: ()/(52-72) 122/58   Weight: (!) 148 kg (326 lb 4.5 oz)  Body mass index is 43.05 kg/m².      Intake/Output Summary (Last 24 hours) at 11/23/2024 0836  Last data filed at 11/23/2024 0520  Gross per 24 hour   Intake 453.79 ml   Output 1050 ml   Net -596.21 ml          Physical Exam  Constitutional:       General: He is not in acute distress.     Appearance: He is obese. He is ill-appearing (chronic). He is not toxic-appearing.   HENT:      Head: Normocephalic and atraumatic.   Eyes:      General: Scleral icterus present.      Extraocular Movements: Extraocular movements intact.      Conjunctiva/sclera: Conjunctivae normal.   Cardiovascular:      Rate and Rhythm: Normal rate. Rhythm irregular.      Heart sounds: Normal heart sounds.   Pulmonary:      Effort: Pulmonary effort is normal. No respiratory distress.      Breath sounds: No wheezing.   Abdominal:      General: Bowel sounds are normal. There is distension.      Palpations: Abdomen is soft.      Tenderness: There is no abdominal tenderness.      Hernia: A hernia (umbilical and L inguinal hernia) is present.   Musculoskeletal:         General: Normal range of motion.      Right upper arm: Swelling and edema present.      Left upper arm: Swelling and edema present.      Cervical back: Normal range of motion and neck supple.      Right lower leg: Edema present.      Left lower leg: Edema present.   Skin:     General: Skin is warm and dry.      Coloration: Skin is not jaundiced.      Findings: Erythema (RUE) present.   Neurological:      General: No focal deficit present.       Mental Status: He is alert and oriented to person, place, and time. He is confused.      Comments: Asterix[employer]   Psychiatric:         Attention and Perception: He is inattentive.         Behavior: Behavior normal.            Vents:     Lines/Drains/Airways       Drain  Duration                  Urethral Catheter 11/20/24 1802 Double-lumen 2 days              Peripheral Intravenous Line  Duration                  Peripheral IV - Single Lumen 11/21/24 2300 20 G Right Breast 1 day         Peripheral IV - Single Lumen 11/22/24 1159 22 G Anterior;Right Upper Arm <1 day                  Significant Labs:    CBC/Anemia Profile:  Recent Labs   Lab 11/22/24 0449 11/22/24  1041 11/23/24 0358   WBC 15.71* 8.52 4.97   HGB 7.2* 7.2* 7.7*   HCT 21.5* 22.6* 22.8*   PLT 72* 69* 77*   MCV 76* 80* 75*   RDW 20.5* 21.4* 20.7*   RETIC 2.4*  --   --         Chemistries:  Recent Labs   Lab 11/21/24 2016 11/22/24 0449 11/23/24  0358   * 132* 130*   K 5.0 4.8 5.0   CL 95 96 95   CO2 22* 22* 20*   BUN 52* 51* 58*   CREATININE 6.3* 6.4* 6.5*   CALCIUM 9.9 10.1 10.1   ALBUMIN  --  3.9 4.1   PROT  --  7.6 7.9   BILITOT  --  6.8* 5.6*   ALKPHOS  --  47 48   ALT  --  6* 7*   AST  --  8* 8*   MG  --  2.6 2.6   PHOS  --  6.1* 6.6*       CMP:   Recent Labs   Lab 11/21/24 2016 11/22/24 0449 11/23/24  0358   * 132* 130*   K 5.0 4.8 5.0   CL 95 96 95   CO2 22* 22* 20*   * 95 154*   BUN 52* 51* 58*   CREATININE 6.3* 6.4* 6.5*   CALCIUM 9.9 10.1 10.1   PROT  --  7.6 7.9   ALBUMIN  --  3.9 4.1   BILITOT  --  6.8* 5.6*   ALKPHOS  --  47 48   AST  --  8* 8*   ALT  --  6* 7*   ANIONGAP 13 14 15       Significant Imaging:  I have reviewed all pertinent imaging results/findings within the past 24 hours.    ABG  Recent Labs   Lab 11/20/24  1542   PH 7.412   PO2 40   PCO2 34.7*   HCO3 22.1*   BE -2     Assessment/Plan:     Neuro  Encephalopathy, metabolic  AAOx4  Will assess for signs of encephalopathy     Cardiac/Vascular  Atrial  "fibrillation  Holding home Eliquis in the setting of bleeding and low Hgb    Renal/  Stage 3a chronic kidney disease  --Nephrology following  --Strict intake and output  --Renally dose all medications  --Avoid nephrotoxic agents    JAE (acute kidney injury)  Baseline creatinine is 1.5    Cr trend 6.3 > 6.4     Plan  - JAE is worsening. Will continue current treatment  - Avoid nephrotoxins and renally dose meds for GFR listed above  - Monitor urine output, serial BMP, and adjust therapy as needed     - Etiology includes volume overload, obstruction, hypotension, possible HRS  - monitor peacock catheter and monitor strict I&O  - was started on midodrine and albumin.  Octreotide started 11/22/2024.  -appreciate nephrology recommendations and recommendation to give 1 dose of IV Lasix 120 mg.   - ICU consulted for Levo administration with MAP goal > 85 . Peripheral Levophed ordered, titration difficult due to tachycardic response, will continue attempting to meet MAP goal however this may be difficult given the patient has Afib.  - Stress dosing with Fludrocortisone and Hydrocortisone as HRS patients tend to have AI      ID  Severe sepsis  This patient does have evidence of infective focus  My overall impression is sepsis.  Source: Abdominal  Antibiotics given-   Antibiotics (72h ago, onward)      Start     Stop Route Frequency Ordered    11/21/24 2100  mupirocin 2 % ointment         11/26/24 2059 Nasl 2 times daily 11/21/24 1329    11/20/24 2100  cefTRIAXone injection 2 g         -- IV Every 24 hours (non-standard times) 11/20/24 1848    11/20/24 1722  vancomycin - pharmacy to dose  (vancomycin IVPB (PEDS and ADULTS))        Placed in "And" Linked Group    -- IV pharmacy to manage frequency 11/20/24 1623          Latest lactate reviewed-  Recent Labs   Lab 11/20/24  1935 11/20/24  2210 11/22/24  0903   LACTATE  --    < > 1.4   POCLAC 3.42*  --   --     < > = values in this interval not displayed.     Organ dysfunction " indicated by Acute kidney injury    Fluid challenge Contraindicated- Fluid bolus is contraindicated in this patient due to End Stage Liver Disease     Post- resuscitation assessment No - Post resuscitation assessment not needed       Will Not start Pressors- Levophed for MAP of 65  Source control achieved by: antibiotics    Hematology  Thrombocytopenia  --Daily CBC  --Transfuse for PLT<10k, or PLT<50K with active bleeding  --Monitor for signs and symptoms of bleeding    Coagulopathy  End Stage Liver Disease precipitated coagulopathy    Will hold all anticoagulation during admission    Endocrine  Hyponatremia  Likely dilutional secondary to end stage liver disease and HRS    Will monitor for S&S of hyponatremia and correct when clinically appropriate.     GI  * Decompensated cirrhosis  MELD 3.0: 35 at 11/22/2024  4:49 AM  MELD-Na: 35 at 11/22/2024  4:49 AM  Calculated from:  Serum Creatinine: 6.4 mg/dL (Using max of 3 mg/dL) at 11/22/2024  4:49 AM  Serum Sodium: 132 mmol/L at 11/22/2024  4:49 AM  Total Bilirubin: 6.8 mg/dL at 11/22/2024  4:49 AM  Serum Albumin: 3.9 g/dL (Using max of 3.5 g/dL) at 11/22/2024  4:49 AM  INR(ratio): 1.9 at 11/22/2024  4:49 AM  Age at listing (hypothetical): 69 years  Sex: Male at 11/22/2024  4:49 AM    --Hepatology following, appreciat recs  --Continue lactulose, rifaximin         Critical Care Daily Checklist:    A: Awake: RASS Goal/Actual Goal:    Actual:     B: Spontaneous Breathing Trial Performed?     C: SAT & SBT Coordinated?  N/A                      D: Delirium: CAM-ICU     E: Early Mobility Performed? No   F: Feeding Goal:    Status:     Current Diet Order   Procedures    Diet Renal Fluid - 1500mL     Order Specific Question:   Fluid restriction:     Answer:   Fluid - 1500mL      AS: Analgesia/Sedation None    T: Thromboembolic Prophylaxis Deferred due to cirrhosis   H: HOB > 300 Yes   U: Stress Ulcer Prophylaxis (if needed) Pantoprazole 40 IV   G: Glucose Control stable   B:  Bowel Function Stool Occurrence: 0   I: Indwelling Catheter (Lines & Boo) Necessity Peripheral IV   D: De-escalation of Antimicrobials/Pharmacotherapies Not at this time    Plan for the day/ETD Titrate to MAP >85    Code Status:  Family/Goals of Care: Full Code  Will discuss GOC if MAP goal deemed unattainable       Critical secondary to Patient has a condition that poses threat to life and bodily function: Hepatorenal syndrome      Critical care was time spent personally by me on the following activities: development of treatment plan with patient or surrogate and bedside caregivers, discussions with consultants, evaluation of patient's response to treatment, examination of patient, ordering and performing treatments and interventions, ordering and review of laboratory studies, ordering and review of radiographic studies, pulse oximetry, re-evaluation of patient's condition. This critical care time did not overlap with that of any other provider or involve time for any procedures.     Jasen Wong MD  Critical Care Medicine  [facility] - Medical ICU

## 2024-11-23 NOTE — HOSPITAL COURSE
69 y.o. male with history of EtOH use disorder, EtOH cirrhosis, HCC s/p y90, morbid obesity BMI 44, HTN, and Afib who presents with severe anasarca and JAE.      Appreciate hepatology and nephrology recommendations.  Diuretics were held because of concern for HRS.  Patient was started on albumin and midodrine per Nephrology recommendations for HRS.  Renal function continued to worsen and recommendation per Nephrology to transfer to ICU for maintaining goal map over 85 on Levophed.  ICU was notified and patient to be assessed to be transferred to ICU.     Patient also has Gram-positive cocci and Gram-negative rods in his blood now.  Possible sources could be got translocation and barrier related infection through skin as he has been significantly edematous and has been oozing.  Has been on vancomycin and Rocephin.  Id was consulted this morning.  Repeat blood cultures were obtained.     Continues to have anemia.  Hemoglobin dropped on 11/20 and was given 1 unit of packed red blood cells.  Did not respond appropriately.  Hemoglobin dropped again to 6.8 on 11/21 and was given 1 unit of packed red blood cells.  Hemoglobin again on 11/22 is 7.2.  No reported melena or hematochezia.  Patient was on Eliquis for atrial fibrillation prior to admission which was held.  Given history of esophageal varices and portal hypertensive gastropathy whereas also could be component of slow bleeding, under production due to CKD and hemolysis while also with decompensated cirrhosis.  Started on Protonix IV b.i.d. and octreotide.     Also clarified with hepatology.  Given patient's current infection we will await ID recommendations however we will be challenging to consider transplant evaluation at this time.  Trend clinical course     Goal clarification with the patient and family.  Patient at this time wants to be full code and continue with Levophed in ICU.  They would consider discussion with palliative care in a day or so if things do  not get better.     In MICU patient started on Levophed, however titration remained difficult given tachycardic response from the patient.     11/24 Trialysis and arterial lines placed overnight and currently on levo and norepi. SLED today.    11/25 Boo dc. Increased midodrine. Continue steroids until d/c pressors. Discussed with Nephrology about our concern for sustained use of pressors to achieve their MAP goals of 85. Will follow up tomorrow.     11/26 Platelets 48. Repeat 38 confirming thrombocytopenia. Concern for HIT. D/c heparin. Increased lactulose dosing. Bladder scan revealed urine in bladder. Boo placed. Off vaso. Continuing levo. Maintain MAP goals 80. PT/OT consulted.     11/27 MAP goal 75-80. Heparin antibody result pending.     11/28 Pt with abdominal pain, decreased bowel movements, emesis. Lactic acid 2.9 and repeat 2.7. Less concerned for mesenteric ischemia and more of a concern was for SBO. NG tube placed with subsequent suction of 500mL fluid. Abdomen less distended after placement and pt subsequently had bowel movement. MAP Goal of 60 with plan to come off pressors entirely and switch to dialysis after permacath, as he is not  liver transplant eligible at this time.     11/29 Pt with ileus. Clear liquids for now. Platelets 24. HIT versus zosyn induced? Peripheral smear sent. Zosyn changed to kaylah. Consent and transfuse 1U. GOC conversation today. Pt opting for long term dialysis.     11/30 naeon. Started back on heparin. One time dose of 120mg lasix today. Hold off SLED/SCUF today and give IV lasix 120 mg once; plan for SLED/SCUF tomorrow     12/1 Patient is becoming more dependent on dialysis. Not a current liver transplant candidate. Still complaining of abdominal pain after discontinuing the NGT. AXR with evidence of dilated bowel loops. Brown bomb administered. The days following administration of the brown bomb, patient had more frequent bowel movements and was able to pass flatulence.  Constipation eventually resolved.   Given the need for pressor support, he was worked up for adrenal insufficiency which was positive. Consequently, he was started on steroids for adrenal insufficiency. His blood pressures improved, however, he still required pressor support, particularly during dialysis. Pressor support was eventually weaned off.   Patient was started on midodrine to help with BP. Nephrology trialed intermittent HD and patient seemed to tolerate it well. Nephrology recommended placement of tunneled catheter for HD, interventional nephrology was consulted. Patient was medically stable for step down to the hospital medicine floors.

## 2024-11-23 NOTE — PROGRESS NOTES
[facility]  Infectious Disease  Progress Note    Patient Name: Juan Carlos Yoo Sr.  MRN: [id number]  Admission Date: 11/20/2024  Length of Stay: 3 days  Attending Physician: Aditya Saha MD  Primary Care Provider: Nyla Rois FNP    Isolation Status: Special Contact  Assessment/Plan:      ID  Positive blood culture  I have reviewed hospital notes from  HM service and other specialty providers. I have also reviewed CBC, CMP/BMP,  cultures and imaging with my interpretation as documented.      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 ID consulted for discordant positive bld cxs.     Pt c/o worsening diffuse swelling Rt arm with pain and erythema after bumping on door frame few days ago; no grossly c/f cellulitis or SSTI. RUE U/S neg for DVT.  Index bld cxs 11/20 with discordant organism, 1 set with lisinopril bacillus, the other with GPCs, micrococcus lluteus. MS/MRSA PCR negative, BCID negative. Suspect positive bld cxs represent contaminant. Repeat bld cxs sent 11/22 and are negative. TTE neg for IE or veg.    Ucx +coag-neg staph; though without urinary sxs and low colony count, suspect not clinically relevant.  RP-U/S neg for  anomaly, no stones or hydronephrosis. Pt with abd distension, but no Abd pain or tenderness on exam. Abd U/S did not reveal significant  fluid for paracentesis; only trace ascites  (IR w/o safe window) and small left pleural effusion as noted on chest-XR, with Rt basilar opacity. Pt denies cough, but endorses SOB on exertion which has been ongoing.      Pt on empiric IV-vanc and ceftriaxone.  Patient appears without any systemic signs of infection and denies any prior to admit or currently.    Recommendations / Plan:  Given blood cultures are suspected as contaminants, recommend DC empiric vanc and ceftriaxone  Continue to monitor off antibiotics  Id will sign off but  reconsult with any concerns        Anticipated Disposition: tbd    Thank you for your consult. I will sign off. Please contact us if you have any additional questions.    OCTAVIA Flores  Infectious Disease  [facility]    Subjective:     Principal Problem:Decompensated cirrhosis    HPI: 69M with decompensated liver cirrhosis 2/2 ETOH, HCC s/p y90, Afib on eliquis - admitted 11/20/24 for diffuse ansarca and JAE, c/f hepapto-renal syndrome. ID consulted for discordant positive bld cxs.     For background, He was recently hospitalized 1 month ago at [facility] for volume overload in the setting of decompensated cirrhosis. He was treated with IV diuresis and discharged with adjustment to his diuretics, currently on lasix 60mg BID and metolazone 2.5mg every other week as per family. Patient reports diffuse swelling of his upper and lower extremities in addition to distended abdomen and worsening dyspnea, which he believes is due to recent medication adjustment. Family states he is non-compliant with low sodium diet and fluid restriction. Family states he has been compliant with diuretics, but hasn't taken eliquis for 3 days due to issue getting refill. Pt claims to be compliant with medications, but is a poor historian. He follows with hepatology, not currently active on transplant list. He states he hasn't consumed alcohol for at least 9 months. Has c/o chills, but denies fever, cough, nausea, vomiting, chest pain, dysuria, hematuria, blood in stool.  Pt c/o worsening diffuse swelling Rt arm with pain and erythema after scratching on door fram few days ago. RUE U/S neg for DVT. Pt AF, VSS, HDS, wbc 15 uptrend. Index bld cxs 11/20 with discordant gram stains, 1 set with GPRs, the other with GPCs resemebling staph. MS/MRSA PCR negative, BCID negative. Suspect positive bld cxs represent contaaminant. Repeat bld cxs sent 11/22.     Ucx +coag-neg staph; though without urinary sxs. RP-U/S neg for   anomaly, no stones or hydronephrosis. Abd U/S did not reveal significant  fluid for paracentesis; only trace ascits and small left pleural effusion as noted on chest-XR, with Rt basilar opacity. Pt with infrequent and non-productive cough.     Pt on empiric IV-vanc and ceftriaxone.           Interval History:   No acute events overnight  Afebrile and white blood cell count normal  Blood cultures with lisinopril bacillus and micrococcus  Repeat blood cultures no growth to date  Denies fever, chills, sweats    Review of Systems   Constitutional:  Negative for chills, diaphoresis and fever.   Respiratory:  Negative for shortness of breath.    Cardiovascular:  Negative for chest pain.   Gastrointestinal:  Negative for abdominal pain, diarrhea, nausea and vomiting.   Genitourinary:  Negative for dysuria and hematuria.   Skin:  Positive for color change.     Objective:     Vital Signs (Most Recent):  Temp: 97.8 °F (36.6 °C) (11/23/24 1600)  Pulse: 93 (11/23/24 1718)  Resp: 15 (11/23/24 1718)  BP: (!) 105/52 (11/23/24 1718)  SpO2: 95 % (11/23/24 1718) Vital Signs (24h Range):  Temp:  [97.5 °F (36.4 °C)-98.6 °F (37 °C)] 97.8 °F (36.6 °C)  Pulse:  [] 93  Resp:  [10-38] 15  SpO2:  [91 %-98 %] 95 %  BP: ()/(50-67) 105/52     Weight: (!) 148 kg (326 lb 4.5 oz)  Body mass index is 43.05 kg/m².    Estimated Creatinine Clearance: 15.8 mL/min (A) (based on SCr of 6.5 mg/dL (H)).     Physical Exam  Vitals and nursing note reviewed.   Constitutional:       General: He is not in acute distress.     Appearance: He is not ill-appearing, toxic-appearing or diaphoretic.   HENT:      Right Ear: There is no impacted cerumen.      Nose: No congestion.      Mouth/Throat:      Pharynx: No oropharyngeal exudate.   Eyes:      General: No scleral icterus.  Cardiovascular:      Rate and Rhythm: Normal rate.      Pulses: Normal pulses.      Heart sounds: Normal heart sounds.   Pulmonary:      Effort: No respiratory distress.      Breath  sounds: Normal breath sounds.   Abdominal:      General: Bowel sounds are normal. There is distension.      Palpations: Abdomen is soft.      Tenderness: There is no abdominal tenderness.   Musculoskeletal:         General: Swelling and tenderness present.      Cervical back: Neck supple. No tenderness.      Right lower leg: Edema present.      Left lower leg: Edema present.   Skin:     General: Skin is warm and dry.      Coloration: Skin is not jaundiced.      Findings: Bruising present. No erythema or rash.   Neurological:      Mental Status: He is oriented to person, place, and time. Mental status is at baseline.   Psychiatric:         Behavior: Behavior normal.         Thought Content: Thought content normal.          Significant Labs: Blood Culture:   Recent Labs   Lab 11/20/24  1429 11/20/24  1444 11/22/24  0903 11/22/24  0904   LABBLOO Gram stain aer bottle: Gram positive cocci in clusters resembling Staph  Results called to and read back by:Crystal Navarrete RN 11/21/2024  23:02  MICROCOCCUS LUTEUS* Gram stain aer bottle: Gram positive rods  Results called to and read back by:Crystal Navarrete RN 11/21/2024  21:58  GRAM-POSITIVE JANE  further identified as Lysinobacillus species  * No Growth to date  No Growth to date No Growth to date  No Growth to date     CBC:   Recent Labs   Lab 11/22/24 0449 11/22/24  1041 11/23/24  0358   WBC 15.71* 8.52 4.97   HGB 7.2* 7.2* 7.7*   HCT 21.5* 22.6* 22.8*   PLT 72* 69* 77*     CMP:   Recent Labs   Lab 11/21/24 2016 11/22/24  0449 11/23/24  0358   * 132* 130*   K 5.0 4.8 5.0   CL 95 96 95   CO2 22* 22* 20*   * 95 154*   BUN 52* 51* 58*   CREATININE 6.3* 6.4* 6.5*   CALCIUM 9.9 10.1 10.1   PROT  --  7.6 7.9   ALBUMIN  --  3.9 4.1   BILITOT  --  6.8* 5.6*   ALKPHOS  --  47 48   AST  --  8* 8*   ALT  --  6* 7*   ANIONGAP 13 14 15     Urine Culture:   Recent Labs   Lab 10/05/24  0040 11/20/24  1801   LABURIN COAGULASE-NEGATIVE STAPHYLOCOCCUS SPECIES  50,000 -  "99,999 cfu/ml  Susceptibility testing not routinely performed.  * COAGULASE-NEGATIVE STAPHYLOCOCCUS SPECIES  10,000 - 49,999 cfu/ml  Susceptibility testing not routinely performed.  *     Urine Studies:   Recent Labs   Lab 11/20/24  1801   COLORU Yellow   APPEARANCEUA Hazy*   PHUR 6.0   SPECGRAV 1.015   PROTEINUA Trace*   GLUCUA Negative   KETONESU Negative   BILIRUBINUA Negative   OCCULTUA Negative   NITRITE Negative   LEUKOCYTESUR 3+*   RBCUA 4   WBCUA >100*   SQUAMEPITHEL 0   HYALINECASTS 4*     Wound Culture: No results for input(s): "LABAERO" in the last 4320 hours.  All pertinent labs within the past 24 hours have been reviewed.    Significant Imaging: I have reviewed all pertinent imaging results/findings within the past 24 hours.  Procedure Component Value Units Date/Time   IR US Abdomen Limited [[id number]] Resulted: 11/21/24 1554   Order Status: Completed Updated: 11/21/24 1556   Narrative:     EXAMINATION:  History: Abdominal distention    Procedure: Limited ultrasound    CPT 4: 05973    Findings:    Limited ultrasound of the abdomen and pelvis demonstrated no significant fluid for diagnostic or therapeutic paracentesis.   Impression:       Limited ultrasound of the abdomen and pelvis demonstrated no significant fluid for diagnostic or therapeutic paracentesis.    _______________________________________________________________    Electronically signed by resident: Lila Floyd  Date: 11/21/2024  Time: 15:35    Electronically signed by: Carlito Her  Date: 11/21/2024  Time: 15:54   US Liver with Doppler (xpd) [[id number]] Resulted: 11/21/24 1118   Order Status: Completed Updated: 11/21/24 1120   Narrative:     EXAMINATION:  US LIVER WITH DOPPLER    CLINICAL HISTORY:  decompensated liver cirrhosis;    TECHNIQUE:  Liver Doppler ultrasound was performed.    COMPARISON:  U/S abdomen 01/11/2024.  MRI abdomen 03/24/2024.    FINDINGS:  Liver: Normal in size, measuring 14.3 cm. Heterogeneous echotexture and " nodular contour in keeping with cirrhosis.  Subcentimeter cyst in the left lobe.    Hepatic vasculature:Portal veins are patent with proper directional flow.  The splenic and superior mesenteric veins are obscured at the portal confluence.  The splenic vein is patent at the hilum.  Hepatic veins and IVC are patent with proper directional flow. Hepatic arteries are patent with normal waveforms. No upper abdominal venous collaterals.    Biliary: Gallbladder surgically absent.  No biliary ductal dilatation. CBD measures 4 mm.    Spleen: Enlarged, measuring 12.7 cm. Homogeneous parenchymal echotexture.    Pancreas: Visualized portions demonstrate normal contours.    Miscellaneous: Trace ascites.  Left pleural effusion.   Impression:       Cirrhosis with signs of portal hypertension including splenomegaly and ascites.    Patent hepatic vasculature.    Left pleural effusion.    Electronically signed by resident: Allen Lagos  Date: 11/21/2024  Time: 10:30    Electronically signed by: Enrique Florence  Date: 11/21/2024  Time: 11:18   US Retroperitoneal Complete [[id number]] Resulted: 11/21/24 1114   Order Status: Completed Updated: 11/21/24 1117   Narrative:     EXAMINATION:  US RETROPERITONEAL COMPLETE    CLINICAL HISTORY:  JAE on CKD;    TECHNIQUE:  Ultrasound of the kidneys and urinary bladder was performed including color flow and Doppler evaluation of the kidneys.    COMPARISON:  MRI abdomen 03/24/2024.    FINDINGS:  Right kidney: The right kidney measures 12.5 cm.  Cortical thinning.  Resistive index measures 1.0.  Decreased color Doppler flow.  Subcentimeter cyst in the lower pole.  No solid mass. No renal stone. No hydronephrosis.    Left kidney: The left kidney measures 12.0 cm.  Cortical thinning.  Resistive index measures 1.0.  Decreased color Doppler flow.    No solid mass. Echogenic focus in the upper pole measuring 6 mm.  No hydronephrosis.    Bladder is decompressed over a Boo catheter.    Splenic resistive  index: 0.61.    Left pleural effusion.   Impression:       Bilateral CKD.    Questionable 6 mm nonobstructing stone in the left upper pole.    No hydronephrosis or solid renal mass.    Electronically signed by resident: Allen Lagos  Date: 11/21/2024  Time: 10:36    Electronically signed by: Enrique Florence  Date: 11/21/2024  Time: 11:14   US Upper Extremity Veins Right [[id number]] Resulted: 11/20/24 1616   Order Status: Completed Updated: 11/20/24 1618   Narrative:     EXAMINATION:  US UPPER EXTREMITY VEINS RIGHT    CLINICAL HISTORY:  Right upper extremity swelling;    TECHNIQUE:  Duplex and color flow Doppler evaluation and dynamic compression was performed of the right upper extremity veins.    COMPARISON:  None    FINDINGS:  Central veins: The internal jugular, subclavian, and axillary veins are patent and free of thrombus.    Arm veins: The brachial, basilic, and cephalic veins are patent and compressible.    Contralateral subclavian/internal jugular veins: The left subclavian and internal jugular veins are patent and free of thrombus.    Other findings: None.   Impression:       No thrombus in central veins of the right upper extremity.      Electronically signed by: Lennox Garsia  Date: 11/20/2024  Time: 16:16   X-Ray Chest AP Portable [[id number]] Resulted: 11/20/24 1436   Order Status: Completed Updated: 11/20/24 1438   Narrative:     EXAMINATION:  XR CHEST AP PORTABLE    CLINICAL HISTORY:  Shortness of breath    TECHNIQUE:  Single frontal view of the chest was performed.    COMPARISON:  October 4, 2024    FINDINGS:  Portable technique combined with overlying soft tissues partly degrades image quality in the lower chest.    Cardiac size is enlarged similar to prior.  No large volume of pleural fluid noted although there is mild blunting of the right costophrenic angle which may relate to a small amount of pleural fluid.  Suspected right basilar opacity, to be correlated clinically for infection.  Two view chest  could be helpful for better assessment.   Impression:       As above      Electronically signed by: Velma Buckley MD  Date: 11/20/2024  Time: 14:36      Imaging History     2024    Date Procedure Name Study Review Link PACS Link Status Accession Number Location   11/23/24 01:32 PM Cardiac monitoring strips Study Review  Final     11/22/24 10:26 PM Cardiac monitoring strips Study Review  Final     11/22/24 06:05 PM Cardiac monitoring strips Study Review  Final     11/21/24 05:46 PM Cardiac monitoring strips Study Review  Final     11/21/24 02:03 PM IR US Abdomen Limited Study Review  Images Final [id number] [facility]   11/21/24 10:17 AM US Retroperitoneal Complete Study Review  Images Final [id number] [facility]   11/21/24 10:17 AM US Liver with Doppler (xpd) Study Review  Images Final [id number] [facility]   11/21/24 08:41 AM Echo Study Review  Images Final [id number] [facility]   11/20/24 10:28 PM Cardiac monitoring strips Study Review  Final     11/20/24 04:13 PM US Upper Extremity Veins Right Study Review  Images Final [id number] [facility]   11/20/24 02:05 PM X-Ray Chest AP Portable Study Review  Images Final [id number] [facility]

## 2024-11-23 NOTE — SUBJECTIVE & OBJECTIVE
Interval History: NAEON, stepped up to ICU for pressors, good UOP, alert and oriented, still +2 edema in lower extremities, on room air.    Review of patient's allergies indicates:  No Known Allergies  Current Facility-Administered Medications   Medication Frequency    albuterol-ipratropium 2.5 mg-0.5 mg/3 mL nebulizer solution 3 mL Q6H PRN    aluminum-magnesium hydroxide-simethicone 200-200-20 mg/5 mL suspension 30 mL QID PRN    ascorbic acid (vitamin C) tablet 500 mg Daily    cefTRIAXone injection 2 g Q24H    chlorhexidine 0.12 % solution 15 mL BID    cyanocobalamin tablet 250 mcg Daily    dextrose 10% bolus 125 mL 125 mL PRN    dextrose 10% bolus 250 mL 250 mL PRN    fludrocortisone tablet 100 mcg Daily    furosemide injection 120 mg Once    furosemide injection 120 mg BID    glucagon (human recombinant) injection 1 mg PRN    glucose chewable tablet 16 g PRN    glucose chewable tablet 24 g PRN    hydrocortisone sodium succinate injection 100 mg Q8H    lactulose 20 gram/30 mL solution Soln 20 g TID    melatonin tablet 6 mg Nightly PRN    midodrine tablet 10 mg TID    mupirocin 2 % ointment BID    naloxone 0.4 mg/mL injection 0.02 mg PRN    octreotide (SANDOSTATIN) 500 mcg in 0.9% NaCl 100 mL infusion Continuous    ondansetron injection 4 mg Q8H PRN    pantoprazole EC tablet 40 mg BID AC    PHENYLephrine (ELIZABETH-SYNEPHRINE) 20 mg/250 mL infusion (PERIPHERAL access) Continuous    phytonadione vitamin k (AQUA-MEPHYTON) 10 mg in D5W 50 mL IVPB Daily    simethicone chewable tablet 80 mg QID PRN    sodium chloride 0.9% flush 10 mL Q12H PRN    sodium chloride 0.9% flush 10 mL PRN    tamsulosin 24 hr capsule 0.4 mg QHS    vancomycin - pharmacy to dose pharmacy to manage frequency       Objective:     Vital Signs (Most Recent):  Temp: 98.1 °F (36.7 °C) (11/23/24 0300)  Pulse: (!) 118 (11/23/24 1016)  Resp: 14 (11/23/24 0930)  BP: 137/67 (11/23/24 1016)  SpO2: 97 % (11/23/24 0930) Vital Signs (24h Range):  Temp:  [97.5 °F  (36.4 °C)-98.1 °F (36.7 °C)] 98.1 °F (36.7 °C)  Pulse:  [] 118  Resp:  [14-38] 14  SpO2:  [91 %-98 %] 97 %  BP: ()/(52-68) 137/67     Weight: (!) 148 kg (326 lb 4.5 oz) (11/22/24 0400)  Body mass index is 43.05 kg/m².  Body surface area is 2.76 meters squared.    I/O last 3 completed shifts:  In: 736.8 [I.V.:412.3; Blood:283; IV Piggyback:41.5]  Out: 1050 [Urine:1050]     Physical Exam  Constitutional:       Appearance: He is obese.   Pulmonary:      Effort: Pulmonary effort is normal. No respiratory distress.      Breath sounds: Normal breath sounds.   Musculoskeletal:         General: Swelling present.      Right lower leg: Edema present.      Left lower leg: Edema present.   Neurological:      General: No focal deficit present.      Mental Status: He is alert and oriented to person, place, and time.          Significant Labs:  BMP:   Recent Labs   Lab 11/23/24  0358   *   *   K 5.0   CL 95   CO2 20*   BUN 58*   CREATININE 6.5*   CALCIUM 10.1   MG 2.6     CBC:   Recent Labs   Lab 11/23/24  0358   WBC 4.97   RBC 3.03*   HGB 7.7*   HCT 22.8*   PLT 77*   MCV 75*   MCH 25.4*   MCHC 33.8        Significant Imaging:  Labs: Reviewed  ECG: Reviewed  X-Ray: Reviewed  US: Reviewed

## 2024-11-23 NOTE — ASSESSMENT & PLAN NOTE
Creatinine stable for now. BMP reviewed- noted Estimated Creatinine Clearance: 15.8 mL/min (A) (based on SCr of 6.5 mg/dL (H)). according to latest data. Based on current GFR, CKD stage is stage 3 - GFR 30-59.  Monitor UOP and serial BMP and adjust therapy as needed. Renally dose meds. Avoid nephrotoxic medications and procedures. See JAE (acute kidney injury).

## 2024-11-24 LAB
ALBUMIN SERPL BCP-MCNC: 3.8 G/DL (ref 3.5–5.2)
ALBUMIN SERPL BCP-MCNC: 4 G/DL (ref 3.5–5.2)
ALBUMIN SERPL BCP-MCNC: 4 G/DL (ref 3.5–5.2)
ALP SERPL-CCNC: 49 U/L (ref 40–150)
ALT SERPL W/O P-5'-P-CCNC: 7 U/L (ref 10–44)
ANION GAP SERPL CALC-SCNC: 12 MMOL/L (ref 8–16)
ANION GAP SERPL CALC-SCNC: 15 MMOL/L (ref 8–16)
ANION GAP SERPL CALC-SCNC: 16 MMOL/L (ref 8–16)
AST SERPL-CCNC: 11 U/L (ref 10–40)
BASOPHILS # BLD AUTO: 0 K/UL (ref 0–0.2)
BASOPHILS NFR BLD: 0 % (ref 0–1.9)
BILIRUB SERPL-MCNC: 3.9 MG/DL (ref 0.1–1)
BUN SERPL-MCNC: 37 MG/DL (ref 8–23)
BUN SERPL-MCNC: 51 MG/DL (ref 8–23)
BUN SERPL-MCNC: 57 MG/DL (ref 8–23)
CALCIUM SERPL-MCNC: 9.1 MG/DL (ref 8.7–10.5)
CALCIUM SERPL-MCNC: 9.4 MG/DL (ref 8.7–10.5)
CALCIUM SERPL-MCNC: 9.9 MG/DL (ref 8.7–10.5)
CHLORIDE SERPL-SCNC: 98 MMOL/L (ref 95–110)
CHLORIDE SERPL-SCNC: 99 MMOL/L (ref 95–110)
CHLORIDE SERPL-SCNC: 99 MMOL/L (ref 95–110)
CO2 SERPL-SCNC: 15 MMOL/L (ref 23–29)
CO2 SERPL-SCNC: 17 MMOL/L (ref 23–29)
CO2 SERPL-SCNC: 18 MMOL/L (ref 23–29)
CREAT SERPL-MCNC: 4.8 MG/DL (ref 0.5–1.4)
CREAT SERPL-MCNC: 6.3 MG/DL (ref 0.5–1.4)
CREAT SERPL-MCNC: 7.4 MG/DL (ref 0.5–1.4)
DIFFERENTIAL METHOD BLD: ABNORMAL
EOSINOPHIL # BLD AUTO: 0 K/UL (ref 0–0.5)
EOSINOPHIL NFR BLD: 0 % (ref 0–8)
ERYTHROCYTE [DISTWIDTH] IN BLOOD BY AUTOMATED COUNT: 21.4 % (ref 11.5–14.5)
EST. GFR  (NO RACE VARIABLE): 12.2 ML/MIN/1.73 M^2
EST. GFR  (NO RACE VARIABLE): 7.3 ML/MIN/1.73 M^2
EST. GFR  (NO RACE VARIABLE): 8.8 ML/MIN/1.73 M^2
GLUCOSE SERPL-MCNC: 136 MG/DL (ref 70–110)
GLUCOSE SERPL-MCNC: 157 MG/DL (ref 70–110)
GLUCOSE SERPL-MCNC: 166 MG/DL (ref 70–110)
HCT VFR BLD AUTO: 23.6 % (ref 40–54)
HGB BLD-MCNC: 7.7 G/DL (ref 14–18)
IMM GRANULOCYTES # BLD AUTO: 0.04 K/UL (ref 0–0.04)
IMM GRANULOCYTES NFR BLD AUTO: 0.6 % (ref 0–0.5)
LACTATE SERPL-SCNC: 3.1 MMOL/L (ref 0.5–2.2)
LYMPHOCYTES # BLD AUTO: 0.3 K/UL (ref 1–4.8)
LYMPHOCYTES NFR BLD: 4.2 % (ref 18–48)
MAGNESIUM SERPL-MCNC: 2.3 MG/DL (ref 1.6–2.6)
MAGNESIUM SERPL-MCNC: 2.3 MG/DL (ref 1.6–2.6)
MAGNESIUM SERPL-MCNC: 2.5 MG/DL (ref 1.6–2.6)
MCH RBC QN AUTO: 24.8 PG (ref 27–31)
MCHC RBC AUTO-ENTMCNC: 32.6 G/DL (ref 32–36)
MCV RBC AUTO: 76 FL (ref 82–98)
MONOCYTES # BLD AUTO: 0.5 K/UL (ref 0.3–1)
MONOCYTES NFR BLD: 8.4 % (ref 4–15)
NEUTROPHILS # BLD AUTO: 5.4 K/UL (ref 1.8–7.7)
NEUTROPHILS NFR BLD: 86.8 % (ref 38–73)
NRBC BLD-RTO: 0 /100 WBC
PHOSPHATE SERPL-MCNC: 4.9 MG/DL (ref 2.7–4.5)
PHOSPHATE SERPL-MCNC: 6.2 MG/DL (ref 2.7–4.5)
PHOSPHATE SERPL-MCNC: 7.3 MG/DL (ref 2.7–4.5)
PLATELET # BLD AUTO: 120 K/UL (ref 150–450)
PMV BLD AUTO: 10.4 FL (ref 9.2–12.9)
POTASSIUM SERPL-SCNC: 4.5 MMOL/L (ref 3.5–5.1)
POTASSIUM SERPL-SCNC: 4.5 MMOL/L (ref 3.5–5.1)
POTASSIUM SERPL-SCNC: 4.6 MMOL/L (ref 3.5–5.1)
PROT SERPL-MCNC: 7.8 G/DL (ref 6–8.4)
RBC # BLD AUTO: 3.11 M/UL (ref 4.6–6.2)
SODIUM SERPL-SCNC: 129 MMOL/L (ref 136–145)
SODIUM SERPL-SCNC: 129 MMOL/L (ref 136–145)
SODIUM SERPL-SCNC: 131 MMOL/L (ref 136–145)
VANCOMYCIN SERPL-MCNC: 17.6 UG/ML
WBC # BLD AUTO: 6.2 K/UL (ref 3.9–12.7)

## 2024-11-24 PROCEDURE — 25000003 PHARM REV CODE 250: Performed by: STUDENT IN AN ORGANIZED HEALTH CARE EDUCATION/TRAINING PROGRAM

## 2024-11-24 PROCEDURE — 63600175 PHARM REV CODE 636 W HCPCS

## 2024-11-24 PROCEDURE — 25000003 PHARM REV CODE 250: Performed by: INTERNAL MEDICINE

## 2024-11-24 PROCEDURE — 83735 ASSAY OF MAGNESIUM: CPT

## 2024-11-24 PROCEDURE — 27000923 HC TRIALYSIS CATHETER, ANY SIZE

## 2024-11-24 PROCEDURE — 80069 RENAL FUNCTION PANEL: CPT

## 2024-11-24 PROCEDURE — 90945 DIALYSIS ONE EVALUATION: CPT

## 2024-11-24 PROCEDURE — 63600175 PHARM REV CODE 636 W HCPCS: Performed by: STUDENT IN AN ORGANIZED HEALTH CARE EDUCATION/TRAINING PROGRAM

## 2024-11-24 PROCEDURE — 36620 INSERTION CATHETER ARTERY: CPT

## 2024-11-24 PROCEDURE — 27000207 HC ISOLATION

## 2024-11-24 PROCEDURE — 87040 BLOOD CULTURE FOR BACTERIA: CPT

## 2024-11-24 PROCEDURE — 25000003 PHARM REV CODE 250

## 2024-11-24 PROCEDURE — 63600175 PHARM REV CODE 636 W HCPCS: Mod: JG

## 2024-11-24 PROCEDURE — 5A1D80Z PERFORMANCE OF URINARY FILTRATION, PROLONGED INTERMITTENT, 6-18 HOURS PER DAY: ICD-10-PCS | Performed by: INTERNAL MEDICINE

## 2024-11-24 PROCEDURE — 80202 ASSAY OF VANCOMYCIN: CPT | Performed by: INTERNAL MEDICINE

## 2024-11-24 PROCEDURE — 83605 ASSAY OF LACTIC ACID: CPT

## 2024-11-24 PROCEDURE — 63600175 PHARM REV CODE 636 W HCPCS: Performed by: INTERNAL MEDICINE

## 2024-11-24 PROCEDURE — 02HV33Z INSERTION OF INFUSION DEVICE INTO SUPERIOR VENA CAVA, PERCUTANEOUS APPROACH: ICD-10-PCS | Performed by: INTERNAL MEDICINE

## 2024-11-24 PROCEDURE — 36556 INSERT NON-TUNNEL CV CATH: CPT

## 2024-11-24 PROCEDURE — 20000000 HC ICU ROOM

## 2024-11-24 PROCEDURE — 83735 ASSAY OF MAGNESIUM: CPT | Mod: 91

## 2024-11-24 PROCEDURE — 94761 N-INVAS EAR/PLS OXIMETRY MLT: CPT

## 2024-11-24 PROCEDURE — 84100 ASSAY OF PHOSPHORUS: CPT

## 2024-11-24 PROCEDURE — 80053 COMPREHEN METABOLIC PANEL: CPT

## 2024-11-24 PROCEDURE — 27000513 HC SENSOR FLOTRAC

## 2024-11-24 PROCEDURE — 85025 COMPLETE CBC W/AUTO DIFF WBC: CPT

## 2024-11-24 RX ORDER — NOREPINEPHRINE BIT/0.9 % NACL 32MG/250ML
0-3 PLASTIC BAG, INJECTION (ML) INTRAVENOUS CONTINUOUS
Status: DISCONTINUED | OUTPATIENT
Start: 2024-11-24 | End: 2024-11-27

## 2024-11-24 RX ORDER — LACTULOSE 10 G/15ML
30 SOLUTION ORAL 3 TIMES DAILY
Status: DISCONTINUED | OUTPATIENT
Start: 2024-11-24 | End: 2024-11-25

## 2024-11-24 RX ORDER — CEFTRIAXONE 1 G/1
1 INJECTION, POWDER, FOR SOLUTION INTRAMUSCULAR; INTRAVENOUS
Status: DISCONTINUED | OUTPATIENT
Start: 2024-11-24 | End: 2024-11-24

## 2024-11-24 RX ORDER — MAGNESIUM SULFATE HEPTAHYDRATE 40 MG/ML
2 INJECTION, SOLUTION INTRAVENOUS
Status: ACTIVE | OUTPATIENT
Start: 2024-11-24 | End: 2024-11-25

## 2024-11-24 RX ORDER — PHENYLEPHRINE HYDROCHLORIDE 10 MG/ML
INJECTION INTRAVENOUS
Status: COMPLETED
Start: 2024-11-24 | End: 2024-11-24

## 2024-11-24 RX ORDER — NOREPINEPHRINE BITARTRATE 0.02 MG/ML
0-.2 INJECTION, SOLUTION INTRAVENOUS CONTINUOUS
Status: DISCONTINUED | OUTPATIENT
Start: 2024-11-24 | End: 2024-11-24

## 2024-11-24 RX ORDER — CEFTRIAXONE 2 G/1
2 INJECTION, POWDER, FOR SOLUTION INTRAMUSCULAR; INTRAVENOUS
Status: DISCONTINUED | OUTPATIENT
Start: 2024-11-25 | End: 2024-11-25

## 2024-11-24 RX ORDER — HEPARIN SODIUM 5000 [USP'U]/ML
5000 INJECTION, SOLUTION INTRAVENOUS; SUBCUTANEOUS EVERY 8 HOURS
Status: DISCONTINUED | OUTPATIENT
Start: 2024-11-24 | End: 2024-11-24

## 2024-11-24 RX ORDER — NOREPINEPHRINE BITARTRATE 0.02 MG/ML
0-3 INJECTION, SOLUTION INTRAVENOUS CONTINUOUS
Status: DISCONTINUED | OUTPATIENT
Start: 2024-11-24 | End: 2024-11-24

## 2024-11-24 RX ORDER — NOREPINEPHRINE BITARTRATE 0.02 MG/ML
INJECTION, SOLUTION INTRAVENOUS
Status: DISPENSED
Start: 2024-11-24 | End: 2024-11-25

## 2024-11-24 RX ORDER — HYDROCODONE BITARTRATE AND ACETAMINOPHEN 500; 5 MG/1; MG/1
TABLET ORAL CONTINUOUS
Status: ACTIVE | OUTPATIENT
Start: 2024-11-24 | End: 2024-11-25

## 2024-11-24 RX ORDER — HEPARIN SODIUM 5000 [USP'U]/ML
7500 INJECTION, SOLUTION INTRAVENOUS; SUBCUTANEOUS EVERY 8 HOURS
Status: DISCONTINUED | OUTPATIENT
Start: 2024-11-24 | End: 2024-11-26

## 2024-11-24 RX ADMIN — Medication 3 MCG/KG/MIN: at 02:11

## 2024-11-24 RX ADMIN — PHYTONADIONE 10 MG: 10 INJECTION, EMULSION INTRAMUSCULAR; INTRAVENOUS; SUBCUTANEOUS at 09:11

## 2024-11-24 RX ADMIN — LACTULOSE 20 G: 20 SOLUTION ORAL at 02:11

## 2024-11-24 RX ADMIN — NOREPINEPHRINE BITARTRATE 0.06 MCG/KG/MIN: 16 SOLUTION INTRAVENOUS at 04:11

## 2024-11-24 RX ADMIN — SODIUM CHLORIDE: 9 INJECTION, SOLUTION INTRAVENOUS at 04:11

## 2024-11-24 RX ADMIN — VASOPRESSIN 0.04 UNITS/MIN: 20 INJECTION INTRAVENOUS at 09:11

## 2024-11-24 RX ADMIN — HYDROCORTISONE SODIUM SUCCINATE 100 MG: 100 INJECTION, POWDER, FOR SOLUTION INTRAMUSCULAR; INTRAVENOUS at 06:11

## 2024-11-24 RX ADMIN — NOREPINEPHRINE BITARTRATE 0.02 MCG/KG/MIN: 16 SOLUTION INTRAVENOUS at 03:11

## 2024-11-24 RX ADMIN — LACTULOSE 20 G: 20 SOLUTION ORAL at 09:11

## 2024-11-24 RX ADMIN — HEPARIN SODIUM 7500 UNITS: 5000 INJECTION INTRAVENOUS; SUBCUTANEOUS at 09:11

## 2024-11-24 RX ADMIN — MUPIROCIN: 20 OINTMENT TOPICAL at 09:11

## 2024-11-24 RX ADMIN — NOREPINEPHRINE BITARTRATE 0.56 MCG/KG/MIN: 1 INJECTION, SOLUTION, CONCENTRATE INTRAVENOUS at 10:11

## 2024-11-24 RX ADMIN — MIDODRINE HYDROCHLORIDE 10 MG: 5 TABLET ORAL at 09:11

## 2024-11-24 RX ADMIN — NOREPINEPHRINE BITARTRATE 0.4 MCG/KG/MIN: 1 INJECTION, SOLUTION, CONCENTRATE INTRAVENOUS at 02:11

## 2024-11-24 RX ADMIN — TAMSULOSIN HYDROCHLORIDE 0.4 MG: 0.4 CAPSULE ORAL at 09:11

## 2024-11-24 RX ADMIN — OXYCODONE HYDROCHLORIDE AND ACETAMINOPHEN 500 MG: 500 TABLET ORAL at 09:11

## 2024-11-24 RX ADMIN — VASOPRESSIN 0.04 UNITS/MIN: 20 INJECTION INTRAVENOUS at 05:11

## 2024-11-24 RX ADMIN — FLUDROCORTISONE ACETATE 100 MCG: 0.1 TABLET ORAL at 09:11

## 2024-11-24 RX ADMIN — VANCOMYCIN HYDROCHLORIDE 500 MG: 500 INJECTION, POWDER, LYOPHILIZED, FOR SOLUTION INTRAVENOUS at 08:11

## 2024-11-24 RX ADMIN — FUROSEMIDE 120 MG: 10 INJECTION, SOLUTION INTRAVENOUS at 09:11

## 2024-11-24 RX ADMIN — Medication 3 MCG/KG/MIN: at 12:11

## 2024-11-24 RX ADMIN — Medication 3 MCG/KG/MIN: at 03:11

## 2024-11-24 RX ADMIN — Medication 250 MCG: at 09:11

## 2024-11-24 RX ADMIN — LACTULOSE 30 G: 20 SOLUTION ORAL at 09:11

## 2024-11-24 RX ADMIN — PHENYLEPHRINE HYDROCHLORIDE 20 MG: 10 INJECTION INTRAVENOUS at 02:11

## 2024-11-24 RX ADMIN — HYDROCORTISONE SODIUM SUCCINATE 100 MG: 100 INJECTION, POWDER, FOR SOLUTION INTRAMUSCULAR; INTRAVENOUS at 02:11

## 2024-11-24 RX ADMIN — Medication 3 MCG/KG/MIN: at 01:11

## 2024-11-24 RX ADMIN — HEPARIN SODIUM 5000 UNITS: 5000 INJECTION INTRAVENOUS; SUBCUTANEOUS at 02:11

## 2024-11-24 RX ADMIN — OCTREOTIDE ACETATE 50 MCG/HR: 500 INJECTION, SOLUTION INTRAVENOUS; SUBCUTANEOUS at 05:11

## 2024-11-24 RX ADMIN — VASOPRESSIN 0.04 UNITS/MIN: 20 INJECTION INTRAVENOUS at 12:11

## 2024-11-24 RX ADMIN — PANTOPRAZOLE SODIUM 40 MG: 40 TABLET, DELAYED RELEASE ORAL at 06:11

## 2024-11-24 RX ADMIN — Medication 3 MCG/KG/MIN: at 04:11

## 2024-11-24 RX ADMIN — PHENYLEPHRINE HYDROCHLORIDE 0.5 MCG/KG/MIN: 10 INJECTION INTRAVENOUS at 05:11

## 2024-11-24 RX ADMIN — NOREPINEPHRINE BITARTRATE 0.06 MCG/KG/MIN: 16 SOLUTION INTRAVENOUS at 11:11

## 2024-11-24 RX ADMIN — HYDROCORTISONE SODIUM SUCCINATE 100 MG: 100 INJECTION, POWDER, FOR SOLUTION INTRAMUSCULAR; INTRAVENOUS at 09:11

## 2024-11-24 RX ADMIN — CEFTRIAXONE SODIUM 1 G: 1 INJECTION, POWDER, FOR SOLUTION INTRAMUSCULAR; INTRAVENOUS at 09:11

## 2024-11-24 RX ADMIN — PANTOPRAZOLE SODIUM 40 MG: 40 TABLET, DELAYED RELEASE ORAL at 02:11

## 2024-11-24 RX ADMIN — MIDODRINE HYDROCHLORIDE 10 MG: 5 TABLET ORAL at 02:11

## 2024-11-24 RX ADMIN — PHENYLEPHRINE HYDROCHLORIDE 100 MG: 10 INJECTION INTRAVENOUS at 08:11

## 2024-11-24 RX ADMIN — PHENYLEPHRINE HYDROCHLORIDE 5 MCG/KG/MIN: 10 INJECTION INTRAVENOUS at 07:11

## 2024-11-24 NOTE — NURSING
Notified team 1 that pt is making minimal urine and Seamus is infusing @ 278 ml/hr. Was informed that the team spoke w/ Dr. Saha and she stated that current MAPs are adequate, that the metoprolol needs to wear off. No orders obtained.

## 2024-11-24 NOTE — PROGRESS NOTES
11/24/24 1622   Treatment   Treatment Type SLED   Treatment Status New start   Dialysis Machine Number [id number]   Dialyzer Time (hours) 0   BVP (Liters) 0 L   Solutions Labeled and Current  Yes   Access Temporary Cath;Right;IJ   Catheter Dressing Intact  Yes   Alarms Engaged Yes   CRRT Comments sled initiated as ordered   Prescription   Time (Hours) 10   Dialysate K + (mEq/L) 4   Dialysate CA + (mEq/L) 2.25   Dialysate HCO3 - (Bicarb) (mEq/L) 30   Dialysate Na + (mEq/L) 135   Cartridge Type Other  (r300)   Dialysate Flow Rate (mL/min) 200   UF Goal Rate 400 mL/hr   CRRT Hourly Documentation   Blood Flow (mL/min) 150   UF Rate 200 cc/hr   Arterial Pressure (mmHg) -20 mmHg   Venous Pressure (mmHg) 60 mmHg   Effluent Pressure (EP) (mmHg) 40 mmHg   Total UF (Hourly Cleared) (mL) 0     SLED initiated as ordered. RIJ CVC aspirated, flushed, and accessed using aseptic technique. Lines connected and secured.

## 2024-11-24 NOTE — ASSESSMENT & PLAN NOTE
Creatinine stable for now. BMP reviewed- noted Estimated Creatinine Clearance: 13.9 mL/min (A) (based on SCr of 7.4 mg/dL (H)). according to latest data. Based on current GFR, CKD stage is stage 3 - GFR 30-59.  Monitor UOP and serial BMP and adjust therapy as needed. Renally dose meds. Avoid nephrotoxic medications and procedures. See JAE (acute kidney injury).

## 2024-11-24 NOTE — PROGRESS NOTES
VANCOMYCIN DOSING BY PHARMACY DISCONTINUATION NOTE    Juan Carlos Yoo Sr. is a 69 y.o. male who had been consulted for vancomycin dosing.    The pharmacy consult for vancomycin dosing has been discontinued.     Vancomycin Dosing by Pharmacy Consult will sign-off. Please reconsult if necessary. Thank you for allowing us to participate in this patient's care.     Meghan Araya Pharm.D.  [id number]

## 2024-11-24 NOTE — PT/OT/SLP DISCHARGE
Occupational Therapy Discharge Summary    Juan Carlos Yoo Sr.  MRN: [id number]   Principal Problem: Decompensated cirrhosis      Patient Discharged from acute Occupational Therapy on 11/24/2024.  Please refer to prior OT note dated 11/22/2024 for functional status.    Assessment:      Patient has not met goals. Patient transferred to lower level of care secondary to transfer to ICU for initiation of pressor support to maintain MAP goal over 85 per neprhology.     Objective:     GOALS:   Multidisciplinary Problems       Occupational Therapy Goals          Problem: Occupational Therapy    Goal Priority Disciplines Outcome Interventions   Occupational Therapy Goal     OT, PT/OT Progressing    Description: Goals to be met by: 11-30-24     Patient will increase functional independence with ADLs by performing:    UE Dressing with Set-up Assistance.  LE Dressing with Stand-by Assistance.  Grooming while standing at sink with Stand-by Assistance.  Toileting from toilet with Stand-by Assistance for hygiene and clothing management.   Step transfer: with SBA and use of LRAD  Independent with HEP to BUE to improve ROM                         Reasons for Discontinuation of Therapy Services  Transfer to alternate level of care.      Plan:     Patient Discharged to:  [facility]    11/24/2024

## 2024-11-24 NOTE — PROGRESS NOTES
[facility] - Medical ICU  Nephrology  Progress Note    Patient Name: Juan Carlos Yoo Sr.  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 Recent admission 10/4 at [facility] for decompensated hepatic cirrhosis where he was discharged with oral diuretic regimen including furosemide 60 mg BID and metolazone 2.5 mg daily, low salt diet with fluid restriction. It is unclear if patient is adherent to these medications or lifestyle modifications. W/u notable for anemia with hb 6.7 s/p 1 unit pRBC 11/20 and JAE on CKD w elevated BUN 49/creatinine 5.9, hyperkalemia (K 6.1>5.1 after shifting), bicarb 18, elevated lactate up to 3.5. Nephrology consulted for JAE with c/o HRS    Interval History: NAEON. RIJ central trialysis and arterial lines placed overnight. MAP not within goal despite 2 vasopressor support with phenylephrine & vasopressin gtt. Renal function worsening, creatinine 7.4 from 6.5 today. Continued on furosemide 120 mg IV BID with minimal urinary output 180cc charted in past 24 hours.     Review of patient's allergies indicates:  No Known Allergies  Current Facility-Administered Medications   Medication Frequency    albuterol-ipratropium 2.5 mg-0.5 mg/3 mL nebulizer solution 3 mL Q6H PRN    aluminum-magnesium hydroxide-simethicone 200-200-20 mg/5 mL suspension 30 mL QID PRN    ascorbic acid (vitamin C) tablet 500 mg Daily    cefTRIAXone injection 1 g Q24H    cyanocobalamin tablet 250 mcg Daily    dextrose 10% bolus 125 mL 125  mL PRN    dextrose 10% bolus 250 mL 250 mL PRN    fludrocortisone tablet 100 mcg Daily    furosemide injection 120 mg Once    furosemide injection 120 mg BID    glucagon (human recombinant) injection 1 mg PRN    glucose chewable tablet 16 g PRN    glucose chewable tablet 24 g PRN    heparin (porcine) injection 5,000 Units Q8H    hydrocortisone sodium succinate injection 100 mg Q8H    lactulose 20 gram/30 mL solution Soln 20 g TID    melatonin tablet 6 mg Nightly PRN    midodrine tablet 10 mg TID    mupirocin 2 % ointment BID    naloxone 0.4 mg/mL injection 0.02 mg PRN    NORepinephrine 4 mg in sodium chloride 0.9% 250 mL infusion (premix) Continuous    octreotide (SANDOSTATIN) 500 mcg in 0.9% NaCl 100 mL infusion Continuous    ondansetron injection 4 mg Q8H PRN    pantoprazole EC tablet 40 mg BID AC    phytonadione vitamin k (AQUA-MEPHYTON) 10 mg in D5W 50 mL IVPB Daily    simethicone chewable tablet 80 mg QID PRN    sodium chloride 0.9% flush 10 mL Q12H PRN    sodium chloride 0.9% flush 10 mL PRN    tamsulosin 24 hr capsule 0.4 mg QHS    vancomycin - pharmacy to dose pharmacy to manage frequency    vasopressin (PITRESSIN) 0.2 Units/mL in 0.9% NaCl 100 mL infusion Continuous       Objective:     Vital Signs (Most Recent):  Temp: 97.7 °F (36.5 °C) (11/24/24 0740)  Pulse: 102 (11/24/24 0807)  Resp: (!) 28 (11/24/24 0807)  BP: (!) 100/54 (11/24/24 0300)  SpO2: (!) 94 % (11/24/24 0807) Vital Signs (24h Range):  Temp:  [97.7 °F (36.5 °C)-98.9 °F (37.2 °C)] 97.7 °F (36.5 °C)  Pulse:  [] 102  Resp:  [10-31] 28  SpO2:  [89 %-97 %] 94 %  BP: ()/(50-63) 100/54  Arterial Line BP: (109-116)/(49-55) 109/54     Weight: (!) 148 kg (326 lb 4.5 oz) (11/22/24 0400)  Body mass index is 43.05 kg/m².  Body surface area is 2.76 meters squared.    I/O last 3 completed shifts:  In: 6773 [P.O.:660; I.V.:5923.4; IV Piggyback:189.6]  Out: 730 [Urine:730]     Physical Exam  Vitals and nursing note reviewed.   Constitutional:        General: He is awake. He is not in acute distress.     Appearance: He is obese. He is not ill-appearing or toxic-appearing.      Comments: Boo catheter in place, yellow concentrated urine in bag   HENT:      Head: Normocephalic and atraumatic.   Eyes:      General: No scleral icterus.     Extraocular Movements: Extraocular movements intact.      Conjunctiva/sclera: Conjunctivae normal.   Cardiovascular:      Rate and Rhythm: Tachycardia present. Rhythm irregular.   Pulmonary:      Effort: Pulmonary effort is normal. No respiratory distress.   Abdominal:      General: There is distension.      Palpations: Abdomen is soft.      Tenderness: There is no abdominal tenderness. There is no guarding or rebound.   Musculoskeletal:         General: No swelling.      Right lower leg: Edema present.      Left lower leg: Edema present.      Comments: 2+ pitting edema in bilateral lower extremities, SCDs in place.    Skin:     General: Skin is warm.      Coloration: Skin is not jaundiced.   Neurological:      Mental Status: He is alert and oriented to person, place, and time.      Motor: No weakness.   Psychiatric:         Behavior: Behavior is cooperative.        Significant Labs:  CBC:   Recent Labs   Lab 11/24/24  0503   WBC 6.20   RBC 3.11*   HGB 7.7*   HCT 23.6*   *   MCV 76*   MCH 24.8*   MCHC 32.6     CMP:   Recent Labs   Lab 11/24/24  0503   *   CALCIUM 9.9   ALBUMIN 4.0   PROT 7.8   *   K 4.5   CO2 15*   CL 99   BUN 57*   CREATININE 7.4*   ALKPHOS 49   ALT 7*   AST 11   BILITOT 3.9*     All labs within the past 24 hours have been reviewed.     Significant Imaging:  All imaging within the past 24 hours have been reviewed.   Assessment/Plan:     Renal/  Stage 3a chronic kidney disease  Creatinine stable for now. BMP reviewed- noted Estimated Creatinine Clearance: 13.9 mL/min (A) (based on SCr of 7.4 mg/dL (H)). according to latest data. Based on current GFR, CKD stage is stage 3 - GFR 30-59.   Monitor UOP and serial BMP and adjust therapy as needed. Renally dose meds. Avoid nephrotoxic medications and procedures. See JAE (acute kidney injury).      JAE (acute kidney injury)  JAE is likely due to pre-renal azotemia due to intravascular volume depletion secondary to decompensated hepatic cirrhosis with volume overload and acute tubular necrosis caused by hemodynamic instability, renal hypoperfusion, possible sepsis. Concerns of hepatorenal syndrome. Hx of CKD3a with baseline creatinine is  1.2-1.4 . Most recent creatinine and eGFR are listed below.  Recent Labs     11/22/24  0449 11/23/24  0358 11/24/24  0503   CREATININE 6.4* 6.5* 7.4*   EGFRNORACEVR 8.7* 8.5* 7.3*       Plan/Recommendations;  Keep MAP goals > 80-85 mmHg, continue levophed and vasopressin for blood pressure support  Plan to initiate SLED for removal of uremic toxins and volume control, consent obtained & placed in patient chart  S/p RIJ trialysis line placement 11/24  If patient is eating all meals, can consider phosphate binder such as sevelamer     - Avoid nephrotoxins and renally dose meds for GFR listed above  - Monitor urine output, serial BMP, and adjust therapy as needed  - Retroperitoneal ultrasound c/w bilateral CKD, showed questionable 6 mm nonobstructing stone in the left upper pole without evidence of hydronephrosis or solid renal mass.      Thank you for your consult. We will follow-up with patient. Please contact us if you have any additional questions.    Margarita Paredes MD  Nephrology  [facility] - Medical ICU

## 2024-11-24 NOTE — PROGRESS NOTES
[facility] - [facility] ICU  Critical Care Medicine  Progress Note    Patient Name: Juan Carlos Yoo Sr.  MRN: [id number]  Admission Date: 11/20/2024  Hospital Length of Stay: 4 days  Code Status: Full Code  Attending Provider: Aditya Saha MD  Primary Care Provider: Nyla Rios FNP   Principal Problem: Decompensated cirrhosis    Subjective:     HPI:  Juan Carlos Yoo is a 69 male with PMH of alcoholic cirrhosis, esophageal varices, Afib on eliquis, and HTN who presents for c/o worsening diffuse swelling as well as R arm pain/erythema. He was recently hospitalized 1 month ago at [facility] for volume overload in the setting of decompensated cirrhosis. He was treated with IV diuresis and discharged with adjustment to his diuretics, currently on lasix 60mg BID and metolazone 2.5mg every other week as per family. Patient reports diffuse swelling of his upper and lower extremities in addition to distended abdomen and worsening dyspnea, which he believes is due to recent medication adjustment. Family states he is non-compliant with low sodium diet and fluid restriction. Family states he has been compliant with diuretics, but rom't taken eliquis for 3 days due to issue getting refill. He also reports scratching right arm on door frame 3-4 days ago which has become red and painful after wrapping in in bandage. He claims to be compliant with medications, but is a poor historian. He follows with hepatology, not currently active on transplant list. He states he hasn't consumed alcohol for at least 9 months. Has c/o chills, but denies fever, cough, nausea, vomiting, chest pain, dysuria, hematuria, blood in stool. Workup in ED remarkable for VS: T 97.6 HR 94 RR 22 BP 95/56 O2 sat 97% on RA. Hb 6.7, MCV 75, Plt 81, PT/INR 22.6/2.2, Na 128, K 6.1, BUN/Cr 49/5.7, Alb 2.8, AST/ALT 35/9, Ammonia 104, , Trop neg, LA 2.9, CXR: Cardiac size is enlarged similar to prior.  No large volume of pleural fluid noted although there is mild  blunting of the right costophrenic angle which may relate to a small amount of pleural fluid.  Suspected right basilar opacity, to be correlated clinically for infection. RUE venous doppler: No thrombus in central veins of the right upper extremity. Patient received vanc/zosyn and lasix 80mg IVP in ED and will be admitted to hospital medicine service for further management.     Pt was admitted to hospital medicine. Blood cultures positive for Gram-positive cocci and Gram-negative rods. ID has been consulted. S/p 2 units PRBC for Hemoglobin dropped 6.8 on 11/21. Pt was on Eliquis for atrial fibrillation prior to admission which was held.  Hepatology following patient's clinical course, but are not evaluating him for transplant at this time. Diuretics were held because of concern for HRS.  Patient was started on albumin and midodrine per Nephrology recommendations for HRS.  Renal function continued to worsen and recommendation per Nephrology to transfer to ICU for maintaining goal map over 85 on Levophed.  ICU was notified and patient to be assessed to be transferred to ICU.       Hospital/ICU Course:  69 y.o. male with history of EtOH use disorder, EtOH cirrhosis, HCC s/p y90, morbid obesity BMI 44, HTN, and Afib who presents with severe anasarca and JAE.      Appreciate hepatology and nephrology recommendations.  Diuretics were held because of concern for HRS.  Patient was started on albumin and midodrine per Nephrology recommendations for HRS.  Renal function continued to worsen and recommendation per Nephrology to transfer to ICU for maintaining goal map over 85 on Levophed.  ICU was notified and patient to be assessed to be transferred to ICU.     Patient also has Gram-positive cocci and Gram-negative rods in his blood now.  Possible sources could be got translocation and barrier related infection through skin as he has been significantly edematous and has been oozing.  Has been on vancomycin and Rocephin.  Id  was consulted this morning.  Repeat blood cultures were obtained.     Continues to have anemia.  Hemoglobin dropped on 11/20 and was given 1 unit of packed red blood cells.  Did not respond appropriately.  Hemoglobin dropped again to 6.8 on 11/21 and was given 1 unit of packed red blood cells.  Hemoglobin again on 11/22 is 7.2.  No reported melena or hematochezia.  Patient was on Eliquis for atrial fibrillation prior to admission which was held.  Given history of esophageal varices and portal hypertensive gastropathy whereas also could be component of slow bleeding, under production due to CKD and hemolysis while also with decompensated cirrhosis.  Started on Protonix IV b.i.d. and octreotide.     Also clarified with hepatology.  Given patient's current infection we will await ID recommendations however we will be challenging to consider transplant evaluation at this time.  Trend clinical course     Goal clarification with the patient and family.  Patient at this time wants to be full code and continue with Levophed in ICU.  They would consider discussion with palliative care in a day or so if things do not get better.      In MICU patient started on Levophed, however titration remained difficult given tachycardic response from the patient.      11/24 Central and arterial lines placed overnight and currently on levo and vaso. D/c phenylephrine. Plan to discuss with Nephrology about need for dialysis.        No new subjective & objective note has been filed under this hospital service since the last note was generated.      ABG  Recent Labs   Lab 11/20/24  1542   PH 7.412   PO2 40   PCO2 34.7*   HCO3 22.1*   BE -2     Assessment/Plan:     Neuro  Encephalopathy, metabolic  AAOx4  Will assess for signs of encephalopathy     Cardiac/Vascular  Atrial fibrillation  Holding home Eliquis in the setting of bleeding and low Hgb    Renal/  Stage 3a chronic kidney disease  --Nephrology following  --Strict intake and  "output  --Renally dose all medications  --Avoid nephrotoxic agents    JAE (acute kidney injury)  Baseline creatinine is 1.5    Cr trend 6.3 > 6.4     Plan  - JAE is worsening. Will continue current treatment  - Avoid nephrotoxins and renally dose meds for GFR listed above  - Monitor urine output, serial BMP, and adjust therapy as needed     - Etiology includes volume overload, obstruction, hypotension, possible HRS  - monitor peacock catheter and monitor strict I&O  - was started on midodrine and albumin.  Octreotide started 11/22/2024.  -appreciate nephrology recommendations and recommendation to give 1 dose of IV Lasix 120 mg.   - ICU consulted for Levo administration with MAP goal > 85 . Peripheral Levophed ordered, titration difficult due to tachycardic response, will continue attempting to meet MAP goal however this may be difficult given the patient has Afib.  - Stress dosing with Fludrocortisone and Hydrocortisone as HRS patients tend to have AI      ID  Severe sepsis  This patient does have evidence of infective focus  My overall impression is sepsis.  Source: Abdominal  Antibiotics given-   Antibiotics (72h ago, onward)      Start     Stop Route Frequency Ordered    11/21/24 2100  mupirocin 2 % ointment         11/26/24 2059 Nasl 2 times daily 11/21/24 1329    11/20/24 2100  cefTRIAXone injection 2 g         -- IV Every 24 hours (non-standard times) 11/20/24 1848    11/20/24 1722  vancomycin - pharmacy to dose  (vancomycin IVPB (PEDS and ADULTS))        Placed in "And" Linked Group    -- IV pharmacy to manage frequency 11/20/24 1623          Latest lactate reviewed-  Recent Labs   Lab 11/20/24  1935 11/20/24  2210 11/22/24  0903   LACTATE  --    < > 1.4   POCLAC 3.42*  --   --     < > = values in this interval not displayed.     Organ dysfunction indicated by Acute kidney injury    Fluid challenge Contraindicated- Fluid bolus is contraindicated in this patient due to End Stage Liver Disease     Post- " resuscitation assessment No - Post resuscitation assessment not needed       Will Not start Pressors- Levophed for MAP of 65  Source control achieved by: antibiotics    Hematology  Thrombocytopenia  --Daily CBC  --Transfuse for PLT<10k, or PLT<50K with active bleeding  --Monitor for signs and symptoms of bleeding    Coagulopathy  End Stage Liver Disease precipitated coagulopathy    Will hold all anticoagulation during admission    Endocrine  Hyponatremia  Likely dilutional secondary to end stage liver disease and HRS    Will monitor for S&S of hyponatremia and correct when clinically appropriate.     GI  * Decompensated cirrhosis  MELD 3.0: 33 at 11/24/2024  5:03 AM  MELD-Na: 33 at 11/24/2024  5:03 AM  Calculated from:  Serum Creatinine: 7.4 mg/dL (Using max of 3 mg/dL) at 11/24/2024  5:03 AM  Serum Sodium: 129 mmol/L at 11/24/2024  5:03 AM  Total Bilirubin: 3.9 mg/dL at 11/24/2024  5:03 AM  Serum Albumin: 4 g/dL (Using max of 3.5 g/dL) at 11/24/2024  5:03 AM  INR(ratio): 1.7 at 11/23/2024  3:58 AM  Age at listing (hypothetical): 69 years  Sex: Male at 11/24/2024  5:03 AM    --Hepatology following, [facility]            Critical Care Daily Checklist:     A: Awake: RASS Goal/Actual Goal:    Actual:     B: Spontaneous Breathing Trial Performed?    C: SAT & SBT Coordinated?  N/A                      D: Delirium: CAM-ICU    E: Early Mobility Performed? No   F: Feeding Goal:    Status:           Current Diet Order   Procedures    Diet Renal Fluid - 1500mL       Order Specific Question:   Fluid restriction:       Answer:   Fluid - 1500mL      AS: Analgesia/Sedation None    T: Thromboembolic Prophylaxis heparin   H: HOB > 300 Yes   U: Stress Ulcer Prophylaxis (if needed) Pantoprazole 40 IV   G: Glucose Control stable   B: Bowel Function Stool Occurrence: 1 overnight. Will titrate lactulose for greater occurence   I: Indwelling Catheter (Lines & Boo) Necessity Peripheral IV, CVC, A line   D: De-escalation of  Antimicrobials/Pharmacotherapies Consider d/c today given ID recs     Plan for the day/ETD Titrate to MAP >85     Code Status:  Family/Goals of Care: Full Code  Will discuss GOC if MAP goal deemed unattainable        Critical secondary to Patient has a condition that poses threat to life and bodily function: hepatorenal syndrome      Critical care was time spent personally by me on the following activities: development of treatment plan with patient or surrogate and bedside caregivers, discussions with consultants, evaluation of patient's response to treatment, examination of patient, ordering and performing treatments and interventions, ordering and review of laboratory studies, ordering and review of radiographic studies, pulse oximetry, re-evaluation of patient's condition. This critical care time did not overlap with that of any other provider or involve time for any procedures.     Livier Macario MD  Critical Care Medicine  [facility] - Medical ICU

## 2024-11-24 NOTE — PT/OT/SLP DISCHARGE
Physical Therapy Discharge Summary    Name: Juan Carlos Yoo Sr.  MRN: [id number]   Principal Problem: Decompensated cirrhosis     Patient Discharged from acute Physical Therapy on  due to transfer to ICU on .  Please refer to prior PT noted date on  for functional status.     Assessment:     Patient appropriate for care in another setting. Patient transferred to lower level of care secondary to  transfer to ICU for initiation of pressor support to maintain MAP goal over 85 per neprhology.     Objective:     GOALS:   Multidisciplinary Problems       Physical Therapy Goals          Problem: Physical Therapy    Goal Priority Disciplines Outcome Interventions   Physical Therapy Goal     PT, PT/OT Progressing    Description: Goals to be met by: 24     Patient will increase functional independence with mobility by performin. Supine to sit with [place]  2. Sit to stand transfer with [place]  3. Bed to chair transfer with [place] using No Assistive Device  4. Gait  x 150 feet with [place] using No Assistive Device.                          Reasons for Discontinuation of Therapy Services  Transfer to alternate level of care.      Plan:     Patient Discharged to:  MICU .      2024

## 2024-11-24 NOTE — ASSESSMENT & PLAN NOTE
JAE is likely due to pre-renal azotemia due to intravascular volume depletion secondary to decompensated hepatic cirrhosis with volume overload and acute tubular necrosis caused by hemodynamic instability, renal hypoperfusion, possible sepsis. Concerns of hepatorenal syndrome. Hx of CKD3a with baseline creatinine is  1.2-1.4 . Most recent creatinine and eGFR are listed below.  Recent Labs     11/22/24  0449 11/23/24  0358 11/24/24  0503   CREATININE 6.4* 6.5* 7.4*   EGFRNORACEVR 8.7* 8.5* 7.3*       Plan/Recommendations;  Keep MAP goals > 80-85 mmHg, continue levophed and vasopressin for blood pressure support  Plan to initiate SLED for removal of uremic toxins and volume control, consent obtained & placed in patient chart  S/p RI trialysis line placement 11/24  If patient is eating all meals, can consider phosphate binder such as sevelamer     - Avoid nephrotoxins and renally dose meds for GFR listed above  - Monitor urine output, serial BMP, and adjust therapy as needed  - Retroperitoneal ultrasound c/w bilateral CKD, showed questionable 6 mm nonobstructing stone in the left upper pole without evidence of hydronephrosis or solid renal mass.

## 2024-11-24 NOTE — ASSESSMENT & PLAN NOTE
MELD 3.0: 33 at 11/24/2024  5:03 AM  MELD-Na: 33 at 11/24/2024  5:03 AM  Calculated from:  Serum Creatinine: 7.4 mg/dL (Using max of 3 mg/dL) at 11/24/2024  5:03 AM  Serum Sodium: 129 mmol/L at 11/24/2024  5:03 AM  Total Bilirubin: 3.9 mg/dL at 11/24/2024  5:03 AM  Serum Albumin: 4 g/dL (Using max of 3.5 g/dL) at 11/24/2024  5:03 AM  INR(ratio): 1.7 at 11/23/2024  3:58 AM  Age at listing (hypothetical): 69 years  Sex: Male at 11/24/2024  5:03 AM    --Hepatology following, appreciat recs

## 2024-11-24 NOTE — EICU
Intervention Initiated From:  COR / ALONDRA Ojeda intervened regarding:  Time-Out    Comments: Called into room for time out of arterial line placement by . See flowsheet.

## 2024-11-24 NOTE — PROCEDURES
"Juan Carlos Yoo Sr. is a 69 y.o. male patient.    Temp: 98.9 °F (37.2 °C) (24)  Pulse: 89 (24)  Resp: 18 (24)  BP: (!) 101/55 (24)  SpO2: 96 % (24)  Weight: (!) 148 kg (326 lb 4.5 oz) (240)  Height: 6' 1" (185.4 cm) (24 0751)       Arterial Line    Date/Time: 2024 5:32 AM  Location procedure was performed: [facility] CRITICAL CARE MEDICINE    Performed by: Batool Posadas MD  Authorized by: Batool Posadas MD  Assisting provider: Julianna Samuels AGACNP-BC  Pre-op Diagnosis: shock  Post-operative diagnosis: shock  Consent Done: Yes  Consent: Verbal consent obtained. Written consent obtained.  Risks and benefits: risks, benefits and alternatives were discussed  Consent given by: patient  Patient understanding: patient states understanding of the procedure being performed  Patient consent: the patient's understanding of the procedure matches consent given  Procedure consent: procedure consent matches procedure scheduled  Relevant documents: relevant documents present and verified  Test results: test results available and properly labeled  Site marked: the operative site was marked  Imaging studies: imaging studies available  Required items: required blood products, implants, devices, and special equipment available  Patient identity confirmed: , name and MRN  Time out: Immediately prior to procedure a "time out" was called to verify the correct patient, procedure, equipment, support staff and site/side marked as required.  Preparation: Patient was prepped and draped in the usual sterile fashion.  Indications: hemodynamic monitoring  Location: right radial    Anesthesia:  Local Anesthetic: lidocaine 1% without epinephrine  Anesthetic total: 2 mL  Renard's test: collateral ulnar flow seen on ultrasound.  Needle gauge: 20  Seldinger technique: Seldinger technique used  Number of attempts: 2  Post-procedure: line sutured and dressing " applied  Post-procedure CMS: normal  Patient tolerance: Patient tolerated the procedure well with no immediate complications  Comments: Arterial line placed, good wave form on monitor and sutured in placed with dressing. Line subsequently fell out. Arterial line replaced in right radial artery .           11/24/2024

## 2024-11-24 NOTE — NURSING
MICU team 1 notified that pt is maxed on keri at 3 with MAPs in the 70's. No new orders at this time.

## 2024-11-24 NOTE — PROGRESS NOTES
Pharmacokinetic Initial Assessment: IV Vancomycin    Assessment/Plan:    Pharmacy reconsulted for patient previously on vancomycin  Vancomycin random this AM resulted at 17.6 mcg/mL  Redose with intravenous vancomycin 500 mg once with subsequent doses when random concentrations are less than 20 mcg/mL  Desired empiric serum trough concentration is 15 to 20 mcg/mL  Draw vancomycin random level on 11/25 at 0300 with AM labs.  Pharmacy will continue to follow and monitor vancomycin.      Please contact pharmacy at extension [phone] with any questions regarding this assessment.     Thank you for the consult,   Aura Lopez PharmD       Patient brief summary:  Juan Carlos Yoo Sr. is a 69 y.o. male initiated on antimicrobial therapy with IV Vancomycin for treatment of suspected bacteremia    Drug Allergies:   Review of patient's allergies indicates:  No Known Allergies    Actual Body Weight:   148 kg    Renal Function:   Estimated Creatinine Clearance: 13.9 mL/min (A) (based on SCr of 7.4 mg/dL (H)).    Dialysis Method (if applicable):  N/A    CBC (last 72 hours):  Recent Labs   Lab Result Units 11/21/24 2016 11/22/24 0449 11/22/24  1041 11/23/24  0358 11/24/24  0503   WBC K/uL 6.21 15.71* 8.52 4.97 6.20   Hemoglobin g/dL 6.8* 7.2* 7.2* 7.7* 7.7*   Hematocrit % 21.0* 21.5* 22.6* 22.8* 23.6*   Platelets K/uL 67* 72* 69* 77* 120*   Gran % %  --  88.2* 80.9* 93.6* 86.8*   Lymph % %  --  4.0* 8.3* 4.0* 4.2*   Mono % %  --  6.7 8.9 2.0* 8.4   Eosinophil % %  --  0.4 0.8 0.0 0.0   Basophil % %  --  0.3 0.4 0.0 0.0   Differential Method   --  Automated Automated Automated Automated       Metabolic Panel (last 72 hours):  Recent Labs   Lab Result Units 11/21/24 2016 11/22/24  0449 11/23/24  0358 11/24/24  0503   Sodium mmol/L 130* 132* 130* 129*   Potassium mmol/L 5.0 4.8 5.0 4.5   Chloride mmol/L 95 96 95 99   CO2 mmol/L 22* 22* 20* 15*   Glucose mg/dL 114* 95 154* 136*   BUN mg/dL 52* 51* 58* 57*   Creatinine mg/dL 6.3* 6.4*  6.5* 7.4*   Albumin g/dL  --  3.9 4.1 4.0   Total Bilirubin mg/dL  --  6.8* 5.6* 3.9*   Alkaline Phosphatase U/L  --  47 48 49   AST U/L  --  8* 8* 11   ALT U/L  --  6* 7* 7*   Magnesium mg/dL  --  2.6 2.6 2.5   Phosphorus mg/dL  --  6.1* 6.6* 7.3*       Drug levels (last 3 results):  Recent Labs   Lab Result Units 11/22/24  0449 11/23/24  0358 11/24/24  0503   Vancomycin, Random ug/mL 14.9 16.2 17.6       Microbiologic Results:  Microbiology Results (last 7 days)       Procedure Component Value Units Date/Time    Blood culture [[id number]]     Order Status: No result Specimen: Blood     Blood culture [[id number]]     Order Status: No result Specimen: Blood     Blood culture x two cultures. Draw prior to antibiotics. [[id number]]  (Abnormal) Collected: 11/20/24 1429    Order Status: Completed Specimen: Blood from Peripheral, Hand, Right Updated: 11/23/24 1409     Blood Culture, Routine Gram stain aer bottle: Gram positive cocci in clusters resembling Staph      Results called to and read back by:Crystal Navarrete RN 11/21/2024  23:02      MICROCOCCUS LUTEUS    Narrative:      Aerobic and anaerobic    Blood culture [[id number]] Collected: 11/22/24 0903    Order Status: Completed Specimen: Blood Updated: 11/23/24 1012     Blood Culture, Routine No Growth to date      No Growth to date    Blood culture [[id number]] Collected: 11/22/24 0904    Order Status: Completed Specimen: Blood Updated: 11/23/24 1012     Blood Culture, Routine No Growth to date      No Growth to date    Blood culture x two cultures. Draw prior to antibiotics. [[id number]]  (Abnormal) Collected: 11/20/24 1444    Order Status: Completed Specimen: Blood from Peripheral, Wrist, Left Updated: 11/23/24 1000     Blood Culture, Routine Gram stain aer bottle: Gram positive rods      Results called to and read back by:Crystal Navarrete RN 11/21/2024  21:58      GRAM-POSITIVE JANE  further identified as Lysinobacillus species      Narrative:      Aerobic and  anaerobic    Urine culture [[id number]]  (Abnormal) Collected: 11/20/24 1801    Order Status: Completed Specimen: Urine Updated: 11/22/24 0301     Urine Culture, Routine COAGULASE-NEGATIVE STAPHYLOCOCCUS SPECIES  10,000 - 49,999 cfu/ml  Susceptibility testing not routinely performed.      Narrative:      Specimen Source->Urine    MRSA/SA Rapid ID by PCR from Blood culture [[id number]] Collected: 11/20/24 1429    Order Status: Completed Updated: 11/22/24 0010     Staph aureus ID by PCR Negative     Methicillin Resistant ID by PCR Negative    Narrative:      Aerobic and anaerobic    Rapid Organism ID by PCR (from Blood culture) [[id number]] Collected: 11/20/24 1444    Order Status: Completed Updated: 11/21/24 2202     Enterococcus faecalis Not Detected     Enterococcus faecium Not Detected     Listeria monocytogenes Not Detected     Staphylococcus spp. Not Detected     Staphylococcus aureus Not Detected     Staphylococcus epidermidis Not Detected     Staphylococcus lugdunensis Not Detected     Streptococcus species Not Detected     Streptococcus agalactiae Not Detected     Streptococcus pneumoniae Not Detected     Streptococcus pyogenes Not Detected     Acinetobacter calcoaceticus/baumannii complex Not Detected     Bacteroides fragilis Not Detected     Enterobacterales Not Detected     Enterobacter cloacae complex Not Detected     Escherichia coli Not Detected     Klebsiella aerogenes Not Detected     Klebsiella oxytoca Not Detected     Klebsiella pneumoniae group Not Detected     Proteus Not Detected     Salmonella sp Not Detected     Serratia marcescens Not Detected     Haemophilus influenzae Not Detected     Neisseria meningtidis Not Detected     Pseudomonas aeruginosa Not Detected     Stenotrophomonas maltophilia Not Detected     Candida albicans Not Detected     Candida auris Not Detected     Candida glabrata Not Detected     Candida krusei Not Detected     Candida parapsilosis Not Detected     Candida tropicalis  Not Detected     Cryptococcus neoformans/gattii Not Detected     CTX-M (ESBL ) Test Not Applicable     IMP (Carbapenem resistant) Test Not Applicable     KPC resistance gene (Carbapenem resistant) Test Not Applicable     mcr-1  Test Not Applicable     mec A/C  Test Not Applicable     mec A/C and MREJ (MRSA) gene Test Not Applicable     NDM (Carbapenem resistant) Test Not Applicable     OXA-48-like (Carbapenem resistant) Test Not Applicable     van A/B (VRE gene) Test Not Applicable     VIM (Carbapenem resistant) Test Not Applicable    Narrative:      Aerobic and anaerobic    (rule out SBP) Gram stain [[id number]]     Order Status: No result Specimen: Ascites     (rule out SBP) Aerobic culture [[id number]]     Order Status: No result Specimen: Ascites     (rule out SBP) Culture, Anaerobic [[id number]]     Order Status: No result Specimen: Ascites     Clostridium difficile EIA [[id number]]     Order Status: Canceled Specimen: Stool     Stool culture [[id number]]     Order Status: No result Specimen: Stool

## 2024-11-24 NOTE — PROGRESS NOTES
Hepatology Progress Note    Juan Carlos Yoo Sr. is a 69 y.o. male admitted to hospital 11/20/2024 (Hospital Day: 5) due to Decompensated cirrhosis.     Interval History    Remains on 2 pressors; currently on ELIZABETH 5 and Vaso. Central line and art line placed overnight. Pending plan from nephrology.     Objective  Temp:  [97.7 °F (36.5 °C)-98.9 °F (37.2 °C)] 97.7 °F (36.5 °C) (11/24 0740)  Pulse:  [] 102 (11/24 0807)  BP: ()/(50-63) 100/54 (11/24 0300)  Resp:  [10-31] 28 (11/24 0807)  SpO2:  [89 %-97 %] 94 % (11/24 0807)  Arterial Line BP: (109-116)/(49-55) 109/54 (11/24 0800)    Laboratory    Lab Results   Component Value Date    WBC 6.20 11/24/2024    HGB 7.7 (L) 11/24/2024    HCT 23.6 (L) 11/24/2024    MCV 76 (L) 11/24/2024     (L) 11/24/2024       Lab Results   Component Value Date     (L) 11/24/2024    K 4.5 11/24/2024    CL 99 11/24/2024    CO2 15 (L) 11/24/2024    BUN 57 (H) 11/24/2024    CREATININE 7.4 (H) 11/24/2024    CALCIUM 9.9 11/24/2024       Lab Results   Component Value Date    ALBUMIN 4.0 11/24/2024    ALT 7 (L) 11/24/2024    AST 11 11/24/2024    GGT 30 12/13/2022    ALKPHOS 49 11/24/2024    BILITOT 3.9 (H) 11/24/2024       Lab Results   Component Value Date    INR 1.7 (H) 11/23/2024    INR 1.9 (H) 11/22/2024    INR 2.1 (H) 11/21/2024       MELD 3.0: 33 at 11/24/2024  5:03 AM  MELD-Na: 33 at 11/24/2024  5:03 AM  Calculated from:  Serum Creatinine: 7.4 mg/dL (Using max of 3 mg/dL) at 11/24/2024  5:03 AM  Serum Sodium: 129 mmol/L at 11/24/2024  5:03 AM  Total Bilirubin: 3.9 mg/dL at 11/24/2024  5:03 AM  Serum Albumin: 4 g/dL (Using max of 3.5 g/dL) at 11/24/2024  5:03 AM  INR(ratio): 1.7 at 11/23/2024  3:58 AM  Age at listing (hypothetical): 69 years  Sex: Male at 11/24/2024  5:03 AM      Assessment    Juan Carlos Yoo Sr. is a 69 y.o. male with history of EtOH use disorder, EtOH cirrhosis, HCC s/p y90, morbid obesity BMI 44, HTN, and Afib who presents with severe anasarca and JAE.  Known history of cirrhosis and is established patient of Dr. Daniels. Was previously evaluated for transplant and was declined due to persistently positive PETHs in the past and also due to concern over BMI. Since May 2023 PETH has been negative, though patient reports he has not drank alcohol for the last 6 months at least. Presents now with decompensated cirrhosis and with significant renal injury with Cr 5.7 up from 1.5 a month prior.     Unclear etiology of cirrhosis decompensation. He does report non-adherence with fluid restriction and volume overload could be contributing; unclear if patient has been taking medications appropriately given his poor insight. Also presents with marked renal dysfunction which is new from 1 month prior, and worsening renal function which is concerning for HRS. No prior known history of CHF and last TTE in 2022 with normal systolic function. Patient does report changes to his diuretic regimen at home recently that were made while he was at [facility] and is not sure if these changes were helping him keep fluid off. No LT[facility] records available, but it seems that patient had home lasix increased to 60mg BID and had metolazone 2.5 every other week added. Patient with fairly limited insight into his medical conditions and his medications, and has poor social support at home with only his son nearby who is a single father.    Problem List:  Decompensated cirrhosis with ascites  Acute renal failure, concerning for HRS  Severe obesity, BMI 43  Hx of EtOH Use disorder  Hx of HCC s/p Y90  Shock      Recommendations:  - PETH from 11/20 negative (last + in 2/2023)   - AFP and  wnl   - May discuss transplant candidacy further with the multidisciplinary committee pending clinical course. His bilirubin is improving and clinical picture seems to be driven mostly by acute renal failure. Appreciate assistance of nephrology team.   - Follow-up infectious workup and continue broad spectrum antibiotics  as the liver synthetic function does seem to be improving with antimicrobial coverage. Blood cultures from 11/20 with Lysinobacillus and Micrococcus. ID consulted and recommend stopping antibiotics, but we would favor continuing them at this time as infection is a common etiology for liver decompensation.   - Consider interval US to assess ascites, obtain diagnostic paracentesis if safe window available   - Please obtain daily CBC, CMP, PT/INR.     Thank you for involving us in the care of Juan Carlos Yoo Sr.. Please call with any additional concerns or questions.    Shira Hernandez DO   Gastroenterology Fellow PGY- V  [facility]

## 2024-11-24 NOTE — PROCEDURES
"Juan Carlos Yoo Sr. is a 69 y.o. male patient.    Temp: 98.9 °F (37.2 °C) (11/23/24 2300)  Pulse: 89 (11/23/24 2300)  Resp: 18 (11/23/24 2300)  BP: (!) 101/55 (11/23/24 2300)  SpO2: 96 % (11/23/24 2300)  Weight: (!) 148 kg (326 lb 4.5 oz) (11/22/24 0400)  Height: 6' 1" (185.4 cm) (11/21/24 0751)       Central Line    Date/Time: 11/24/2024 5:36 AM    Performed by: Batool Posadas MD  Authorized by: Batool Posadas MD    Location procedure was performed:  [facility]  [facility]  Pre-operative diagnosis:  Shock, HRS  Post-operative diagnosis:  Shock, HRS  Consent Done ?:  Yes  Time out complete?: Verified correct patient, procedure, equipment, staff, and site/side    Indications:  Med administration  Anesthesia:  Local infiltration  Local anesthetic:  Lidocaine 1% without epinephrine  Anesthetic total (ml):  5  Preparation:  Skin prepped with ChloraPrep  Skin prep agent dried: Skin prep agent completely dried prior to procedure    Sterile barriers: All five maximal sterile barriers used - gloves, gown, cap, mask and large sterile sheet    Hand hygiene: Hand hygiene performed immediately prior to central venous catheter insertion    Location:  Right internal jugular  Catheter type:  Triple lumen  Catheter size:  13 Fr  Inserted Catheter Length (cm):  15  Ultrasound guidance: Yes    Vessel Caliber:  Large   patent  Comprressibility:  Normal  Needle advanced into vessel with real time ultrasound guidance.    Guidewire confirmed in vessel.    Steril sheath on probe.    Sterile gel used.  Manometry: Yes    Number of attempts:  1  Securement:  Chlorhexidine patch, sterile dressing applied, line sutured and blood return through all ports  Complications: No    Estimated blood loss (mL):  20  Guidewire: guidewire removed intact    XRay:  Placement verified by x-ray  Adverse Events:  None      11/24/2024    "

## 2024-11-24 NOTE — SUBJECTIVE & OBJECTIVE
Interval History/Significant Events: central and arterial line placed overnight for continuous BP monitoring. Currently on levo and phenylephrine.     Review of Systems  Review of Systems   Constitutional:  Positive for fatigue. Negative for chills and fever.   HENT:  Negative for congestion.    Respiratory:  Negative for cough, shortness of breath and wheezing.    Cardiovascular:  Positive for leg swelling.   Gastrointestinal:  Positive for abdominal distention. Negative for abdominal pain, diarrhea and nausea.   Genitourinary:  Positive for scrotal swelling. Negative for dysuria and frequency.   Musculoskeletal:  Negative for arthralgias, joint swelling and myalgias.   Skin:  Positive for rash and wound.   Neurological:  Negative for dizziness and light-headedness.   Psychiatric/Behavioral:  Negative for confusion.    Objective:     Vital Signs (Most Recent):  Temp: 97.7 °F (36.5 °C) (11/24/24 0740)  Pulse: 102 (11/24/24 0807)  Resp: (!) 28 (11/24/24 0807)  BP: (!) 100/54 (11/24/24 0300)  SpO2: (!) 94 % (11/24/24 0807) Vital Signs (24h Range):  Temp:  [97.7 °F (36.5 °C)-98.9 °F (37.2 °C)] 97.7 °F (36.5 °C)  Pulse:  [] 102  Resp:  [10-38] 28  SpO2:  [89 %-98 %] 94 %  BP: ()/(50-67) 100/54  Arterial Line BP: (109-116)/(49-55) 109/54   Weight: (!) 148 kg (326 lb 4.5 oz)  Body mass index is 43.05 kg/m².      Intake/Output Summary (Last 24 hours) at 11/24/2024 0851  Last data filed at 11/24/2024 0800  Gross per 24 hour   Intake 6584.98 ml   Output 220 ml   Net 6364.98 ml          Physical Exam   Physical Exam  Constitutional:       General: He is not in acute distress.     Appearance: He is obese. He is ill-appearing (chronic). He is not toxic-appearing.   HENT:      Head: Normocephalic and atraumatic.   Eyes:      General: Scleral icterus present.      Extraocular Movements: Extraocular movements intact.      Conjunctiva/sclera: Conjunctivae normal.   Cardiovascular:      Rate and Rhythm: Normal rate.  Rhythm irregular.      Heart sounds: Normal heart sounds.   Pulmonary:      Effort: Pulmonary effort is normal. No respiratory distress.      Breath sounds: No wheezing.   Abdominal:      General: Bowel sounds are normal. There is distension.      Palpations: Abdomen is soft.      Tenderness: There is no abdominal tenderness.      Hernia: A hernia (umbilical and L inguinal hernia) is present.   Musculoskeletal:         General: Normal range of motion.      Right upper arm: Swelling and edema present.      Left upper arm: Swelling and edema present.      Cervical back: Normal range of motion and neck supple.      Right lower leg: Edema present.      Left lower leg: Edema present.   Skin:     General: Skin is warm and dry.      Coloration: Skin is not jaundiced.      Findings: Erythema (RUE) present.   Neurological:      General: No focal deficit present.      Mental Status: He is alert and oriented to person, place, and time. He is confused.      Comments: [place]ix[place]   Psychiatric:         Attention and Perception: He is inattentive.         Behavior: Behavior normal.     Vents:     Lines/Drains/Airways       Central Venous Catheter Line  Duration             Trialysis (Dialysis) Catheter 11/24/24 0441 right internal jugular <1 day              Drain  Duration                  Urethral Catheter 11/20/24 1802 Double-lumen 3 days              Arterial Line  Duration             Arterial Line 11/24/24 0317 Right Radial <1 day              Peripheral Intravenous Line  Duration                  Peripheral IV - Single Lumen 11/21/24 2300 20 G Right Breast 2 days         Peripheral IV - Single Lumen 11/22/24 1159 22 G Anterior;Right Upper Arm 1 day                  Significant Labs:    CBC/Anemia Profile:  Recent Labs   Lab 11/22/24  1041 11/23/24  0358 11/24/24  0503   WBC 8.52 4.97 6.20   HGB 7.2* 7.7* 7.7*   HCT 22.6* 22.8* 23.6*   PLT 69* 77* 120*   MCV 80* 75* 76*   RDW 21.4* 20.7* 21.4*        Chemistries:  Recent Labs    Lab 11/23/24  0358 11/24/24  0503   * 129*   K 5.0 4.5   CL 95 99   CO2 20* 15*   BUN 58* 57*   CREATININE 6.5* 7.4*   CALCIUM 10.1 9.9   ALBUMIN 4.1 4.0   PROT 7.9 7.8   BILITOT 5.6* 3.9*   ALKPHOS 48 49   ALT 7* 7*   AST 8* 11   MG 2.6 2.5   PHOS 6.6* 7.3*       All pertinent labs within the past 24 hours have been reviewed.    Significant Imaging:  I have reviewed all pertinent imaging results/findings within the past 24 hours.

## 2024-11-24 NOTE — SUBJECTIVE & OBJECTIVE
If you have an active [facility] account, please look for your well child questionnaire to come to your [facility] account before your next well child visit.    Well-Child Checkup: 14 to 18 Years     Stay involved in your teens life. Make sure your teen knows youre always there when he or she needs to talk.     During the teen years, its important to keep having yearly checkups. Your teen may be embarrassed about having a checkup. Reassure your teen that the exam is normal and necessary. Be aware that the healthcare provider may ask to talk with your child without you in the exam room.  School and social issues  Here are some topics you, your teen, and the healthcare provider may want to discuss during this visit:  · School performance. How is your child doing in school? Is homework finished on time? Does your child stay organized? These are skills you can help with. Keep in mind that a drop in school performance can be a sign of other problems.  · Friendships. Do you like your childs friends? Do the friendships seem healthy? Make sure to talk to your teen about who his or her friends are and how they spend time together. Peer pressure can be a problem among teenagers.  · Life at home. How is your childs behavior? Does he or she get along with others in the family? Is he or she respectful of you, other adults, and authority? Does your child participate in family events, or does he or she withdraw from other family members?  · Risky behaviors. Many teenagers are curious about drugs, alcohol, smoking, and sex. Talk openly about these issues. Answer your childs questions, and dont be afraid to ask questions of your own. If youre not sure how to approach these topics, talk to the healthcare provider for advice.   Puberty  Your teen may still be experiencing some of the changes of puberty, such as:  · Acne and body odor. Hormones that increase during puberty can cause acne (pimples) on the face and body. Hormones  Interval History: NAEON. RIJ central trialysis and arterial lines placed overnight. MAP not within goal despite 2 vasopressor support with phenylephrine & vasopressin gtt. Renal function worsening, creatinine 7.4 from 6.5 today. Continued on furosemide 120 mg IV BID with minimal urinary output 180cc charted in past 24 hours.     Review of patient's allergies indicates:  No Known Allergies  Current Facility-Administered Medications   Medication Frequency    albuterol-ipratropium 2.5 mg-0.5 mg/3 mL nebulizer solution 3 mL Q6H PRN    aluminum-magnesium hydroxide-simethicone 200-200-20 mg/5 mL suspension 30 mL QID PRN    ascorbic acid (vitamin C) tablet 500 mg Daily    cefTRIAXone injection 1 g Q24H    cyanocobalamin tablet 250 mcg Daily    dextrose 10% bolus 125 mL 125 mL PRN    dextrose 10% bolus 250 mL 250 mL PRN    fludrocortisone tablet 100 mcg Daily    furosemide injection 120 mg Once    furosemide injection 120 mg BID    glucagon (human recombinant) injection 1 mg PRN    glucose chewable tablet 16 g PRN    glucose chewable tablet 24 g PRN    heparin (porcine) injection 5,000 Units Q8H    hydrocortisone sodium succinate injection 100 mg Q8H    lactulose 20 gram/30 mL solution Soln 20 g TID    melatonin tablet 6 mg Nightly PRN    midodrine tablet 10 mg TID    mupirocin 2 % ointment BID    naloxone 0.4 mg/mL injection 0.02 mg PRN    NORepinephrine 4 mg in sodium chloride 0.9% 250 mL infusion (premix) Continuous    octreotide (SANDOSTATIN) 500 mcg in 0.9% NaCl 100 mL infusion Continuous    ondansetron injection 4 mg Q8H PRN    pantoprazole EC tablet 40 mg BID AC    phytonadione vitamin k (AQUA-MEPHYTON) 10 mg in D5W 50 mL IVPB Daily    simethicone chewable tablet 80 mg QID PRN    sodium chloride 0.9% flush 10 mL Q12H PRN    sodium chloride 0.9% flush 10 mL PRN    tamsulosin 24 hr capsule 0.4 mg QHS    vancomycin - pharmacy to dose pharmacy to manage frequency    vasopressin (PITRESSIN) 0.2 Units/mL in 0.9% NaCl 100  can also increase sweating and cause a stronger body odor.  · Body changes. The body grows and matures during puberty. Hair will grow in the pubic area and on other parts of the body. Girls grow breasts and menstruate (have monthly periods). A boys voice changes, becoming lower and deeper. As the penis matures, erections and wet dreams will start to happen. Talk to your teen about what to expect, and help him or her deal with these changes when possible.  · Emotional changes. Along with these physical changes, youll likely notice changes in your teens personality. He or she may develop an interest in dating and becoming more than friends with other kids. Also, its normal for your teen to be slaughter. Try to be patient and consistent. Encourage conversations, even when he or she doesnt seem to want to talk. No matter how your teen acts, he or she still needs a parent.  Nutrition and exercise tips  Your teenager likely makes his or her own decisions about what to eat and how to spend free time. You cant always have the final say, but you can encourage healthy habits. Your teen should:  · Get at least 30 to 60 minutes of physical activity every day. This time can be broken up throughout the day. After-school sports, dance or martial arts classes, riding a bike, or even walking to school or a friends house counts as activity.    · Limit screen time to 1 hour each day. This includes time spent watching TV, playing video games, using the computer, and texting. If your teen has a TV, computer, or video game console in the bedroom, consider replacing it with a music player.   · Eat healthy. Your child should eat fruits, vegetables, lean meats, and whole grains every day. Less healthy foods--like french fries, candy, and chips--should be eaten rarely. Some teens fall into the trap of snacking on junk food and fast food throughout the day. Make sure the kitchen is stocked with healthy choices for after-school snacks.  mL infusion Continuous       Objective:     Vital Signs (Most Recent):  Temp: 97.7 °F (36.5 °C) (11/24/24 0740)  Pulse: 102 (11/24/24 0807)  Resp: (!) 28 (11/24/24 0807)  BP: (!) 100/54 (11/24/24 0300)  SpO2: (!) 94 % (11/24/24 0807) Vital Signs (24h Range):  Temp:  [97.7 °F (36.5 °C)-98.9 °F (37.2 °C)] 97.7 °F (36.5 °C)  Pulse:  [] 102  Resp:  [10-31] 28  SpO2:  [89 %-97 %] 94 %  BP: ()/(50-63) 100/54  Arterial Line BP: (109-116)/(49-55) 109/54     Weight: (!) 148 kg (326 lb 4.5 oz) (11/22/24 0400)  Body mass index is 43.05 kg/m².  Body surface area is 2.76 meters squared.    I/O last 3 completed shifts:  In: 6773 [P.O.:660; I.V.:5923.4; IV Piggyback:189.6]  Out: 730 [Urine:730]     Physical Exam  Vitals and nursing note reviewed.   Constitutional:       General: He is awake. He is not in acute distress.     Appearance: He is obese. He is not ill-appearing or toxic-appearing.      Comments: Boo catheter in place, yellow concentrated urine in bag   HENT:      Head: Normocephalic and atraumatic.   Eyes:      General: No scleral icterus.     Extraocular Movements: Extraocular movements intact.      Conjunctiva/sclera: Conjunctivae normal.   Cardiovascular:      Rate and Rhythm: Tachycardia present. Rhythm irregular.   Pulmonary:      Effort: Pulmonary effort is normal. No respiratory distress.   Abdominal:      General: There is distension.      Palpations: Abdomen is soft.      Tenderness: There is no abdominal tenderness. There is no guarding or rebound.   Musculoskeletal:         General: No swelling.      Right lower leg: Edema present.      Left lower leg: Edema present.      Comments: 2+ pitting edema in bilateral lower extremities, SCDs in place.    Skin:     General: Skin is warm.      Coloration: Skin is not jaundiced.   Neurological:      Mental Status: He is alert and oriented to person, place, and time.      Motor: No weakness.   Psychiatric:         Behavior: Behavior is cooperative.         Significant Labs:  CBC:   Recent Labs   Lab 11/24/24  0503   WBC 6.20   RBC 3.11*   HGB 7.7*   HCT 23.6*   *   MCV 76*   MCH 24.8*   MCHC 32.6     CMP:   Recent Labs   Lab 11/24/24  0503   *   CALCIUM 9.9   ALBUMIN 4.0   PROT 7.8   *   K 4.5   CO2 15*   CL 99   BUN 57*   CREATININE 7.4*   ALKPHOS 49   ALT 7*   AST 11   BILITOT 3.9*     All labs within the past 24 hours have been reviewed.     Significant Imaging:  All imaging within the past 24 hours have been reviewed.    If your teen does choose to eat junk food, consider making him or her buy it with his or her own money.   · Eat 3 meals a day. Many kids skip breakfast and even lunch. Not only is this unhealthy, it can also hurt school performance. Make sure your teen eats breakfast. If your teen does not like the food served at school for lunch, allow him or her to prepare a bag lunch.  · Have at least one family meal with you each day. Busy schedules often limit time for sitting and talking. Sitting and eating together allows for family time. It also lets you see what and how your child eats.   · Limit soda and juice drinks. A small soda is OK once in a while. But soda, sports drinks, and juice drinks are no substitute for healthier drinks. Sports and juice drinks are no better. Water and low-fat or nonfat milk are the best choices.  Hygiene tips  Recommendations for good hygiene include the following:   · Teenagers should bathe or shower daily and use deodorant.  · Let the healthcare provider know if you or your teen have questions about hygiene or acne.  · Bring your teen to the dentist at least twice a year for teeth cleaning and a checkup.  · Remind your teen to brush and floss his or her teeth before bed.  Sleeping tips  During the teen years, sleep patterns may change. Many teenagers have a hard time falling asleep. This can lead to sleeping late the next morning. Here are some tips to help your teen get the rest he or she needs:  · Encourage your teen to keep a consistent bedtime, even on weekends. Sleeping is easier when the body follows a routine. Dont let your teen stay up too late at night or sleep in too long in the morning.  · Help your teen wake up, if needed. Go into the bedroom, open the blinds, and get your teen out of bed -- even on weekends or during school vacations.  · Being active during the day will help your child sleep better at night.  · Discourage use of the TV, computer, or video games for at least an  hour before your teen goes to bed. (This is good advice for parents, too!)  · Make a rule that cell phones must be turned off at night.  Safety tips  Recommendations to keep your teen safe include the following:  · Set rules for how your teen can spend time outside of the house. Give your child a nighttime curfew. If your child has a cell phone, check in periodically by calling to ask where he or she is and what he or she is doing.  · Make sure cell phones and portable music players are used safely and responsibly. Help your teen understand that it is dangerous to talk on the phone, text, or listen to music with headphones while he or she is riding a bike or walking outdoors, especially when crossing the street.  · Constant loud music can cause hearing damage, so monitor your teens music volume. Many music players let you set a limit for how loud the volume can be turned up. Check the directions for details.  · When your teen is old enough for a s license, encourage safe driving. Teach your teen to always wear a seat belt, drive the speed limit, and follow the rules of the road. Do not allow your teenager to text or talk on a cell phone while driving. (And dont do this yourself! Remember, you set an example.)  · Set rules and limits around driving and use of the car. If your teen gets a ticket or has an accident, there should be consequences. Driving is a privilege that can be taken away if your child doesnt follow the rules.  · Teach your child to make good decisions about drugs, alcohol, sex, and other risky behaviors. Work together to come up with strategies for staying safe and dealing with peer pressure. Make sure your teenager knows he or she can always come to you for help.  Tests and vaccines  If you have a strong family history of high cholesterol, your teens blood cholesterol may be tested at this visit. Based on recommendations from the CDC, at this visit your child may receive the following  vaccines:  · Meningococcal  · Influenza (flu), annually  Recognizing signs of depression  Its normal for teenagers to have extreme mood swings as a result of their changing hormones. Its also just a part of growing up. But sometimes a teenagers mood swings are signs of a larger problem. If your teen seems depressed for more than 2 weeks, you should be concerned. Signs of depression include:  · Use of drugs or alcohol  · Problems in school and at home  · Frequent episodes of running away  · Thoughts or talk of death or suicide  · Withdrawal from family and friends  · Sudden changes in eating or sleeping habits  · Sexual promiscuity or unplanned pregnancy  · Hostile behavior or rage  · Loss of pleasure in life  Depressed teens can be helped with treatment. Talk to your childs healthcare provider. Or check with your local mental health center, social service agency, or hospital. Assure your teen that his or her pain can be eased. Offer your love and support. If your teen talks about death or suicide, seek help right away.      Next checkup at: _______________________________     PARENT NOTES:  Date Last Reviewed: 12/1/2016  © 0564-1206 [employer]. [address] [place] [address], [place] [address]. All rights reserved. This information is not intended as a substitute for professional medical care. Always follow your healthcare professional's instructions.

## 2024-11-25 LAB
ACINETOBACTER CALCOACETICUS/BAUMANNII COMPLEX: NOT DETECTED
AFP L3 MFR SERPL: 7.5 %
AFP SERPL-MCNC: 5.7 NG/ML
ALBUMIN SERPL BCP-MCNC: 3.7 G/DL (ref 3.5–5.2)
ALBUMIN SERPL BCP-MCNC: 4 G/DL (ref 3.5–5.2)
ALP SERPL-CCNC: 53 U/L (ref 40–150)
ALT SERPL W/O P-5'-P-CCNC: 11 U/L (ref 10–44)
ANION GAP SERPL CALC-SCNC: 14 MMOL/L (ref 8–16)
ANION GAP SERPL CALC-SCNC: 15 MMOL/L (ref 8–16)
AST SERPL-CCNC: 18 U/L (ref 10–40)
BACTEROIDES FRAGILIS: NOT DETECTED
BASOPHILS # BLD AUTO: 0 K/UL (ref 0–0.2)
BASOPHILS NFR BLD: 0 % (ref 0–1.9)
BILIRUB SERPL-MCNC: 4.1 MG/DL (ref 0.1–1)
BUN SERPL-MCNC: 28 MG/DL (ref 8–23)
BUN SERPL-MCNC: 36 MG/DL (ref 8–23)
CALCIUM SERPL-MCNC: 8.9 MG/DL (ref 8.7–10.5)
CALCIUM SERPL-MCNC: 9.2 MG/DL (ref 8.7–10.5)
CANDIDA ALBICANS: NOT DETECTED
CANDIDA AURIS: NOT DETECTED
CANDIDA GLABRATA: NOT DETECTED
CANDIDA KRUSEI: NOT DETECTED
CANDIDA PARAPSILOSIS: NOT DETECTED
CANDIDA TROPICALIS: NOT DETECTED
CHLORIDE SERPL-SCNC: 98 MMOL/L (ref 95–110)
CHLORIDE SERPL-SCNC: 98 MMOL/L (ref 95–110)
CHLORIDE UR-SCNC: <20 MMOL/L (ref 25–200)
CO2 SERPL-SCNC: 16 MMOL/L (ref 23–29)
CO2 SERPL-SCNC: 16 MMOL/L (ref 23–29)
CREAT SERPL-MCNC: 3.9 MG/DL (ref 0.5–1.4)
CREAT SERPL-MCNC: 4.9 MG/DL (ref 0.5–1.4)
CRYPTOCOCCUS NEOFORMANS/GATTII: NOT DETECTED
CTX-M GENE (ESBL PRODUCER): NORMAL
DIFFERENTIAL METHOD BLD: ABNORMAL
ENTEROBACTER CLOACAE COMPLEX: NOT DETECTED
ENTEROBACTERALES: NOT DETECTED
ENTEROCOCCUS FAECALIS: NOT DETECTED
ENTEROCOCCUS FAECIUM: NOT DETECTED
EOSINOPHIL # BLD AUTO: 0 K/UL (ref 0–0.5)
EOSINOPHIL NFR BLD: 0 % (ref 0–8)
ERYTHROCYTE [DISTWIDTH] IN BLOOD BY AUTOMATED COUNT: 22.4 % (ref 11.5–14.5)
ESCHERICHIA COLI: NOT DETECTED
EST. GFR  (NO RACE VARIABLE): 12 ML/MIN/1.73 M^2
EST. GFR  (NO RACE VARIABLE): 15.7 ML/MIN/1.73 M^2
GLUCOSE SERPL-MCNC: 146 MG/DL (ref 70–110)
GLUCOSE SERPL-MCNC: 172 MG/DL (ref 70–110)
HAEMOPHILUS INFLUENZAE: NOT DETECTED
HCT VFR BLD AUTO: 24.7 % (ref 40–54)
HGB BLD-MCNC: 8 G/DL (ref 14–18)
IMM GRANULOCYTES # BLD AUTO: 0.03 K/UL (ref 0–0.04)
IMM GRANULOCYTES NFR BLD AUTO: 0.4 % (ref 0–0.5)
IMMUNOLOGIST REVIEW: ABNORMAL
IMP GENE (CARBAPENEM RESISTANT): NORMAL
KLEBSIELLA AEROGENES: NOT DETECTED
KLEBSIELLA OXYTOCA: NOT DETECTED
KLEBSIELLA PNEUMONIAE GROUP: NOT DETECTED
KPC RESISTANCE GENE (CARBAPENEM): NORMAL
LISTERIA MONOCYTOGENES: NOT DETECTED
LYMPHOCYTES # BLD AUTO: 0.2 K/UL (ref 1–4.8)
LYMPHOCYTES NFR BLD: 2.8 % (ref 18–48)
MAGNESIUM SERPL-MCNC: 2.1 MG/DL (ref 1.6–2.6)
MAGNESIUM SERPL-MCNC: 2.2 MG/DL (ref 1.6–2.6)
MCH RBC QN AUTO: 24.6 PG (ref 27–31)
MCHC RBC AUTO-ENTMCNC: 32.4 G/DL (ref 32–36)
MCR-1: NORMAL
MCV RBC AUTO: 76 FL (ref 82–98)
MEC A/C AND MREJ (MRSA): NORMAL
MEC A/C: NORMAL
MONOCYTES # BLD AUTO: 0.9 K/UL (ref 0.3–1)
MONOCYTES NFR BLD: 12.6 % (ref 4–15)
NDM GENE (CARBAPENEM RESISTANT): NORMAL
NEISSERIA MENINGITIDIS: NOT DETECTED
NEUTROPHILS # BLD AUTO: 5.8 K/UL (ref 1.8–7.7)
NEUTROPHILS NFR BLD: 84.2 % (ref 38–73)
NRBC BLD-RTO: 0 /100 WBC
OXA-48-LIKE (CARBAPENEM RESISTANT): NORMAL
PHOSPHATE SERPL-MCNC: 4 MG/DL (ref 2.7–4.5)
PHOSPHATE SERPL-MCNC: 4.9 MG/DL (ref 2.7–4.5)
PLATELET # BLD AUTO: 74 K/UL (ref 150–450)
PMV BLD AUTO: 11.7 FL (ref 9.2–12.9)
POTASSIUM SERPL-SCNC: 4.3 MMOL/L (ref 3.5–5.1)
POTASSIUM SERPL-SCNC: 4.5 MMOL/L (ref 3.5–5.1)
PROT SERPL-MCNC: 8 G/DL (ref 6–8.4)
PROTEUS SPECIES: NOT DETECTED
PSEUDOMONAS AERUGINOSA: NOT DETECTED
RBC # BLD AUTO: 3.25 M/UL (ref 4.6–6.2)
SALMONELLA SP: NOT DETECTED
SERRATIA MARCESCENS: NOT DETECTED
SODIUM SERPL-SCNC: 128 MMOL/L (ref 136–145)
SODIUM SERPL-SCNC: 129 MMOL/L (ref 136–145)
SODIUM UR-SCNC: 14 MMOL/L (ref 20–250)
STAPHYLOCOCCUS AUREUS: NOT DETECTED
STAPHYLOCOCCUS EPIDERMIDIS: NOT DETECTED
STAPHYLOCOCCUS LUGDUNESIS: NOT DETECTED
STAPHYLOCOCCUS SPECIES: NOT DETECTED
STENOTROPHOMONAS MALTOPHILIA: NOT DETECTED
STREPTOCOCCUS AGALACTIAE: NOT DETECTED
STREPTOCOCCUS PNEUMONIAE: NOT DETECTED
STREPTOCOCCUS PYOGENES: NOT DETECTED
STREPTOCOCCUS SPECIES: NOT DETECTED
VAN A/B (VRE GENE): NORMAL
VANCOMYCIN SERPL-MCNC: 13.7 UG/ML
VIM GENE (CARBAPENEM RESISTANT): NORMAL
WBC # BLD AUTO: 6.85 K/UL (ref 3.9–12.7)

## 2024-11-25 PROCEDURE — 94761 N-INVAS EAR/PLS OXIMETRY MLT: CPT

## 2024-11-25 PROCEDURE — 82436 ASSAY OF URINE CHLORIDE: CPT

## 2024-11-25 PROCEDURE — 63600175 PHARM REV CODE 636 W HCPCS: Performed by: INTERNAL MEDICINE

## 2024-11-25 PROCEDURE — 25000003 PHARM REV CODE 250

## 2024-11-25 PROCEDURE — 80053 COMPREHEN METABOLIC PANEL: CPT

## 2024-11-25 PROCEDURE — 25000003 PHARM REV CODE 250: Performed by: INTERNAL MEDICINE

## 2024-11-25 PROCEDURE — 84100 ASSAY OF PHOSPHORUS: CPT

## 2024-11-25 PROCEDURE — 63600175 PHARM REV CODE 636 W HCPCS: Mod: JG

## 2024-11-25 PROCEDURE — 84300 ASSAY OF URINE SODIUM: CPT

## 2024-11-25 PROCEDURE — 99291 CRITICAL CARE FIRST HOUR: CPT | Mod: ,,, | Performed by: INTERNAL MEDICINE

## 2024-11-25 PROCEDURE — 83735 ASSAY OF MAGNESIUM: CPT

## 2024-11-25 PROCEDURE — 83735 ASSAY OF MAGNESIUM: CPT | Mod: 91

## 2024-11-25 PROCEDURE — 85025 COMPLETE CBC W/AUTO DIFF WBC: CPT

## 2024-11-25 PROCEDURE — 20000000 HC ICU ROOM

## 2024-11-25 PROCEDURE — 80069 RENAL FUNCTION PANEL: CPT

## 2024-11-25 PROCEDURE — 99233 SBSQ HOSP IP/OBS HIGH 50: CPT | Mod: ,,, | Performed by: INTERNAL MEDICINE

## 2024-11-25 PROCEDURE — 63600175 PHARM REV CODE 636 W HCPCS

## 2024-11-25 PROCEDURE — 99291 CRITICAL CARE FIRST HOUR: CPT | Mod: ,,, | Performed by: STUDENT IN AN ORGANIZED HEALTH CARE EDUCATION/TRAINING PROGRAM

## 2024-11-25 PROCEDURE — 80202 ASSAY OF VANCOMYCIN: CPT | Performed by: INTERNAL MEDICINE

## 2024-11-25 PROCEDURE — 27000207 HC ISOLATION

## 2024-11-25 RX ORDER — LACTULOSE 10 G/15ML
30 SOLUTION ORAL 4 TIMES DAILY
Status: DISCONTINUED | OUTPATIENT
Start: 2024-11-25 | End: 2024-11-26

## 2024-11-25 RX ORDER — PANTOPRAZOLE SODIUM 40 MG/1
40 TABLET, DELAYED RELEASE ORAL DAILY
Status: DISCONTINUED | OUTPATIENT
Start: 2024-11-26 | End: 2024-11-28

## 2024-11-25 RX ORDER — MIDODRINE HYDROCHLORIDE 5 MG/1
15 TABLET ORAL 3 TIMES DAILY
Status: DISCONTINUED | OUTPATIENT
Start: 2024-11-25 | End: 2024-12-03

## 2024-11-25 RX ADMIN — LACTULOSE 30 G: 20 SOLUTION ORAL at 08:11

## 2024-11-25 RX ADMIN — NOREPINEPHRINE BITARTRATE 0.46 MCG/KG/MIN: 1 INJECTION, SOLUTION, CONCENTRATE INTRAVENOUS at 03:11

## 2024-11-25 RX ADMIN — VANCOMYCIN HYDROCHLORIDE 500 MG: 500 INJECTION, POWDER, LYOPHILIZED, FOR SOLUTION INTRAVENOUS at 09:11

## 2024-11-25 RX ADMIN — PIPERACILLIN SODIUM AND TAZOBACTAM SODIUM 4.5 G: 4; .5 INJECTION, POWDER, FOR SOLUTION INTRAVENOUS at 08:11

## 2024-11-25 RX ADMIN — OXYCODONE HYDROCHLORIDE AND ACETAMINOPHEN 500 MG: 500 TABLET ORAL at 08:11

## 2024-11-25 RX ADMIN — TAMSULOSIN HYDROCHLORIDE 0.4 MG: 0.4 CAPSULE ORAL at 08:11

## 2024-11-25 RX ADMIN — MUPIROCIN: 20 OINTMENT TOPICAL at 08:11

## 2024-11-25 RX ADMIN — HEPARIN SODIUM 7500 UNITS: 5000 INJECTION INTRAVENOUS; SUBCUTANEOUS at 01:11

## 2024-11-25 RX ADMIN — HYDROCORTISONE SODIUM SUCCINATE 100 MG: 100 INJECTION, POWDER, FOR SOLUTION INTRAMUSCULAR; INTRAVENOUS at 08:11

## 2024-11-25 RX ADMIN — LACTULOSE 30 G: 20 SOLUTION ORAL at 04:11

## 2024-11-25 RX ADMIN — FLUDROCORTISONE ACETATE 100 MCG: 0.1 TABLET ORAL at 08:11

## 2024-11-25 RX ADMIN — PIPERACILLIN SODIUM AND TAZOBACTAM SODIUM 4.5 G: 4; .5 INJECTION, POWDER, FOR SOLUTION INTRAVENOUS at 12:11

## 2024-11-25 RX ADMIN — VASOPRESSIN 0.04 UNITS/MIN: 20 INJECTION INTRAVENOUS at 06:11

## 2024-11-25 RX ADMIN — HYDROCORTISONE SODIUM SUCCINATE 100 MG: 100 INJECTION, POWDER, FOR SOLUTION INTRAMUSCULAR; INTRAVENOUS at 06:11

## 2024-11-25 RX ADMIN — MIDODRINE HYDROCHLORIDE 10 MG: 5 TABLET ORAL at 02:11

## 2024-11-25 RX ADMIN — PANTOPRAZOLE SODIUM 40 MG: 40 TABLET, DELAYED RELEASE ORAL at 06:11

## 2024-11-25 RX ADMIN — Medication 250 MCG: at 08:11

## 2024-11-25 RX ADMIN — NOREPINEPHRINE BITARTRATE 0.34 MCG/KG/MIN: 1 INJECTION, SOLUTION, CONCENTRATE INTRAVENOUS at 11:11

## 2024-11-25 RX ADMIN — NOREPINEPHRINE BITARTRATE 0.46 MCG/KG/MIN: 1 INJECTION, SOLUTION, CONCENTRATE INTRAVENOUS at 06:11

## 2024-11-25 RX ADMIN — VASOPRESSIN 0.04 UNITS/MIN: 20 INJECTION INTRAVENOUS at 03:11

## 2024-11-25 RX ADMIN — FUROSEMIDE 120 MG: 10 INJECTION, SOLUTION INTRAVENOUS at 08:11

## 2024-11-25 RX ADMIN — HEPARIN SODIUM 7500 UNITS: 5000 INJECTION INTRAVENOUS; SUBCUTANEOUS at 08:11

## 2024-11-25 RX ADMIN — HYDROCORTISONE SODIUM SUCCINATE 100 MG: 100 INJECTION, POWDER, FOR SOLUTION INTRAMUSCULAR; INTRAVENOUS at 01:11

## 2024-11-25 RX ADMIN — VANCOMYCIN HYDROCHLORIDE 500 MG: 500 INJECTION, POWDER, LYOPHILIZED, FOR SOLUTION INTRAVENOUS at 04:11

## 2024-11-25 RX ADMIN — VASOPRESSIN 0.04 UNITS/MIN: 20 INJECTION INTRAVENOUS at 11:11

## 2024-11-25 RX ADMIN — MIDODRINE HYDROCHLORIDE 15 MG: 5 TABLET ORAL at 08:11

## 2024-11-25 RX ADMIN — LACTULOSE 30 G: 20 SOLUTION ORAL at 02:11

## 2024-11-25 RX ADMIN — MIDODRINE HYDROCHLORIDE 10 MG: 5 TABLET ORAL at 08:11

## 2024-11-25 RX ADMIN — HEPARIN SODIUM 7500 UNITS: 5000 INJECTION INTRAVENOUS; SUBCUTANEOUS at 06:11

## 2024-11-25 RX ADMIN — CEFTRIAXONE SODIUM 2 G: 2 INJECTION, POWDER, FOR SOLUTION INTRAMUSCULAR; INTRAVENOUS at 07:11

## 2024-11-25 NOTE — PROGRESS NOTES
[facility] - Medical ICU  Infectious Disease  Progress Note    Patient Name: Juan Carlos Yoo Sr.  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 Blood cultures from admission with B. Diminuta, micrococcus luteus, lysinobacillus species. Seen by ID previously who recommended stopping antibiotics due to concern these were contaminants. Repeat blood cultures have been no growth. Has since been transferred to ICU with increased pressor requirement. Blood cultures repeated on 11/24 are no growth x 24 hours.     Recommendations  - can continue vancomycin goal trough 15-20 mcg/ml until 11/24 blood cultures no growth x 48 hours  - continue pip-tazo 4.5 q8 hours for B. Diminuta, will follow susceptibilities     Above discussed with primary team.     Critical care time: 40 minutes  I personally spent critical care time on the following: evaluating this patient's organ dysfunction, development of treatment plan, discussing treatment plan with patient or surrogate and bedside caregivers, discussions with critical care service and/or consultants, evaluation of patient's response to treatment, physical examination of patient, ordering and review of treatments interventions, laboratory studies, and radiographic studies, re-evaluation of patient's condition. This critical care time did not overlap with that of any other provider of the same specialty or involve time for procedures. This patient has increasing pressor requirements, worsening renal dysfunction requiring renal replacement therapy related to their infection. They continue  to be critically ill.     Anticipated Disposition: TBD    Thank you for your consult. I will follow-up with patient. Please contact us if you have any additional questions.    Johana Mendes MD  Infectious Disease  [facility] - Medical ICU    Subjective:     Principal Problem:Decompensated cirrhosis    HPI: 69M with decompensated liver cirrhosis 2/2 ETOH, HCC s/p y90, Afib on eliquis - admitted 11/20/24 for diffuse ansarca and JAE, c/f hepapto-renal syndrome. ID consulted for discordant positive bld cxs.     For background, He was recently hospitalized 1 month ago at [facility] for volume overload in the setting of decompensated cirrhosis. He was treated with IV diuresis and discharged with adjustment to his diuretics, currently on lasix 60mg BID and metolazone 2.5mg every other week as per family. Patient reports diffuse swelling of his upper and lower extremities in addition to distended abdomen and worsening dyspnea, which he believes is due to recent medication adjustment. Family states he is non-compliant with low sodium diet and fluid restriction. Family states he has been compliant with diuretics, but hasn't taken eliquis for 3 days due to issue getting refill. Pt claims to be compliant with medications, but is a poor historian. He follows with hepatology, not currently active on transplant list. He states he hasn't consumed alcohol for at least 9 months. Has c/o chills, but denies fever, cough, nausea, vomiting, chest pain, dysuria, hematuria, blood in stool.  Pt c/o worsening diffuse swelling Rt arm with pain and erythema after scratching on door fram few days ago. RUE U/S neg for DVT. Pt AF, VSS, HDS, wbc 15 uptrend. Index bld cxs 11/20 with discordant gram stains, 1 set with GPRs, the other with GPCs resemebling staph. MS/MRSA PCR negative, BCID negative. Suspect positive bld cxs represent contaaminant. Repeat bld cxs sent 11/22.     Ucx +coag-neg staph; though without urinary sxs. RP-U/S neg for   anomaly, no stones or hydronephrosis. Abd U/S did not reveal significant  fluid for paracentesis; only trace ascits and small left pleural effusion as noted on chest-XR, with Rt basilar opacity. Pt with infrequent and non-productive cough.     Pt on empiric IV-vanc and ceftriaxone.           Interval History: ID called back for new results on prior blood cultures. Transferred to ICU on 11/22 for concern of hepatorenal syndrome and pressor use. Pressor requirement has increased. He has been afebrile and WBC normal.     Review of Systems   Constitutional:  Positive for fatigue. Negative for fever.   Musculoskeletal:  Positive for joint swelling.   Skin:  Negative for wound.     Objective:     Vital Signs (Most Recent):  Temp: 97.6 °F (36.4 °C) (11/25/24 1105)  Pulse: (!) 121 (11/25/24 1105)  Resp: 17 (11/25/24 1105)  BP: (!) 127/51 (11/25/24 1105)  SpO2: 99 % (11/25/24 1105) Vital Signs (24h Range):  Temp:  [97.6 °F (36.4 °C)-99.9 °F (37.7 °C)] 97.6 °F (36.4 °C)  Pulse:  [] 121  Resp:  [12-28] 17  SpO2:  [84 %-100 %] 99 %  BP: (115-127)/(48-51) 127/51  Arterial Line BP: ()/(48-64) 127/57     Weight: (!) 148 kg (326 lb 4.5 oz)  Body mass index is 43.05 kg/m².    Estimated Creatinine Clearance: 26.3 mL/min (A) (based on SCr of 3.9 mg/dL (H)).     Physical Exam  Vitals and nursing note reviewed.   Constitutional:       Appearance: He is ill-appearing. He is not toxic-appearing.   HENT:      Head: Normocephalic.   Pulmonary:      Effort: Pulmonary effort is normal. No respiratory distress.   Abdominal:      General: There is distension.      Palpations: Abdomen is soft.      Tenderness: There is no abdominal tenderness.   Musculoskeletal:      Right lower leg: Edema present.      Left lower leg: Edema present.   Skin:     General: Skin is warm.      Findings: Bruising present.   Neurological:      Mental Status: He is alert.   Psychiatric:         Mood and Affect: Mood normal.          Significant Labs:    Microbiology Results (last 7 days)       Procedure Component Value Units Date/Time    Blood culture [[id number]] Collected: 11/22/24 0903    Order Status: Completed Specimen: Blood Updated: 11/25/24 1012     Blood Culture, Routine No Growth to date      No Growth to date      No Growth to date      No Growth to date    Blood culture [[id number]] Collected: 11/22/24 0904    Order Status: Completed Specimen: Blood Updated: 11/25/24 1012     Blood Culture, Routine No Growth to date      No Growth to date      No Growth to date      No Growth to date    Blood culture x two cultures. Draw prior to antibiotics. [[id number]]  (Abnormal) Collected: 11/20/24 1429    Order Status: Completed Specimen: Blood from Peripheral, Hand, Right Updated: 11/25/24 0804     Blood Culture, Routine Gram stain aer bottle: Gram positive cocci in clusters resembling Staph      Results called to and read back by:Crystal Navarrete RN 11/21/2024  23:02      Gram stain malik bottle: Gram positive rods      Results called to and read back by: Melissa Hernandez RN  11/25/2024      01:35      BREVUNDIMONAS SPECIES  further identified as b. diminuta group  Susceptibility pending        MICROCOCCUS LUTEUS  Organism is a probable contaminant      Narrative:      Aerobic and anaerobic    Rapid Organism ID by PCR (from Blood culture) [[id number]] Collected: 11/20/24 1429    Order Status: Completed Updated: 11/25/24 0247     Enterococcus faecalis Not Detected     Enterococcus faecium Not Detected     Listeria monocytogenes Not Detected     Staphylococcus spp. Not Detected     Staphylococcus aureus Not Detected     Staphylococcus epidermidis Not Detected     Staphylococcus lugdunensis Not Detected     Streptococcus species Not Detected     Streptococcus agalactiae Not Detected     Streptococcus pneumoniae Not Detected     Streptococcus pyogenes Not Detected     Acinetobacter calcoaceticus/baumannii complex Not Detected     Bacteroides fragilis Not Detected      Enterobacterales Not Detected     Enterobacter cloacae complex Not Detected     Escherichia coli Not Detected     Klebsiella aerogenes Not Detected     Klebsiella oxytoca Not Detected     Klebsiella pneumoniae group Not Detected     Proteus Not Detected     Salmonella sp Not Detected     Serratia marcescens Not Detected     Haemophilus influenzae Not Detected     Neisseria meningtidis Not Detected     Pseudomonas aeruginosa Not Detected     Stenotrophomonas maltophilia Not Detected     Candida albicans Not Detected     Candida auris Not Detected     Candida glabrata Not Detected     Candida krusei Not Detected     Candida parapsilosis Not Detected     Candida tropicalis Not Detected     Cryptococcus neoformans/gattii Not Detected     CTX-M (ESBL ) Test Not Applicable     IMP (Carbapenem resistant) Test Not Applicable     KPC resistance gene (Carbapenem resistant) Test Not Applicable     mcr-1  Test Not Applicable     mec A/C  Test Not Applicable     mec A/C and MREJ (MRSA) gene Test Not Applicable     NDM (Carbapenem resistant) Test Not Applicable     OXA-48-like (Carbapenem resistant) Test Not Applicable     van A/B (VRE gene) Test Not Applicable     VIM (Carbapenem resistant) Test Not Applicable    Narrative:      Aerobic and anaerobic    Blood culture [[id number]] Collected: 11/24/24 1804    Order Status: Completed Specimen: Blood from Peripheral, Antecubital, Left Updated: 11/25/24 0115     Blood Culture, Routine No Growth to date    Blood culture [[id number]] Collected: 11/24/24 1804    Order Status: Completed Specimen: Blood from Peripheral, Antecubital, Right Updated: 11/25/24 0115     Blood Culture, Routine No Growth to date    Blood culture x two cultures. Draw prior to antibiotics. [[id number]]  (Abnormal) Collected: 11/20/24 1444    Order Status: Completed Specimen: Blood from Peripheral, Wrist, Left Updated: 11/23/24 1000     Blood Culture, Routine Gram stain aer bottle: Gram positive rods       Results called to and read back by:Crystal Navarrete RN 11/21/2024  21:58      GRAM-POSITIVE JANE  further identified as Lysinobacillus species      Narrative:      Aerobic and anaerobic    Urine culture [[id number]]  (Abnormal) Collected: 11/20/24 1801    Order Status: Completed Specimen: Urine Updated: 11/22/24 0301     Urine Culture, Routine COAGULASE-NEGATIVE STAPHYLOCOCCUS SPECIES  10,000 - 49,999 cfu/ml  Susceptibility testing not routinely performed.      Narrative:      Specimen Source->Urine    MRSA/SA Rapid ID by PCR from Blood culture [[id number]] Collected: 11/20/24 1429    Order Status: Completed Updated: 11/22/24 0010     Staph aureus ID by PCR Negative     Methicillin Resistant ID by PCR Negative    Narrative:      Aerobic and anaerobic    Rapid Organism ID by PCR (from Blood culture) [[id number]] Collected: 11/20/24 1444    Order Status: Completed Updated: 11/21/24 2202     Enterococcus faecalis Not Detected     Enterococcus faecium Not Detected     Listeria monocytogenes Not Detected     Staphylococcus spp. Not Detected     Staphylococcus aureus Not Detected     Staphylococcus epidermidis Not Detected     Staphylococcus lugdunensis Not Detected     Streptococcus species Not Detected     Streptococcus agalactiae Not Detected     Streptococcus pneumoniae Not Detected     Streptococcus pyogenes Not Detected     Acinetobacter calcoaceticus/baumannii complex Not Detected     Bacteroides fragilis Not Detected     Enterobacterales Not Detected     Enterobacter cloacae complex Not Detected     Escherichia coli Not Detected     Klebsiella aerogenes Not Detected     Klebsiella oxytoca Not Detected     Klebsiella pneumoniae group Not Detected     Proteus Not Detected     Salmonella sp Not Detected     Serratia marcescens Not Detected     Haemophilus influenzae Not Detected     Neisseria meningtidis Not Detected     Pseudomonas aeruginosa Not Detected     Stenotrophomonas maltophilia Not Detected     Candida  albicans Not Detected     Candida auris Not Detected     Candida glabrata Not Detected     Candida krusei Not Detected     Candida parapsilosis Not Detected     Candida tropicalis Not Detected     Cryptococcus neoformans/gattii Not Detected     CTX-M (ESBL ) Test Not Applicable     IMP (Carbapenem resistant) Test Not Applicable     KPC resistance gene (Carbapenem resistant) Test Not Applicable     mcr-1  Test Not Applicable     mec A/C  Test Not Applicable     mec A/C and MREJ (MRSA) gene Test Not Applicable     NDM (Carbapenem resistant) Test Not Applicable     OXA-48-like (Carbapenem resistant) Test Not Applicable     van A/B (VRE gene) Test Not Applicable     VIM (Carbapenem resistant) Test Not Applicable    Narrative:      Aerobic and anaerobic    (rule out SBP) Gram stain [[id number]]     Order Status: No result Specimen: Ascites     (rule out SBP) Aerobic culture [[id number]]     Order Status: No result Specimen: Ascites     (rule out SBP) Culture, Anaerobic [[id number]]     Order Status: No result Specimen: Ascites     Clostridium difficile EIA [[id number]]     Order Status: Canceled Specimen: Stool     Stool culture [[id number]]     Order Status: No result Specimen: Stool             Significant Imaging: I have reviewed all pertinent imaging results/findings within the past 24 hours.

## 2024-11-25 NOTE — SUBJECTIVE & OBJECTIVE
Interval History: ID called back for new results on prior blood cultures. Transferred to ICU on 11/22 for concern of hepatorenal syndrome and pressor use. Pressor requirement has increased. He has been afebrile and WBC normal.     Review of Systems   Constitutional:  Positive for fatigue. Negative for fever.   Musculoskeletal:  Positive for joint swelling.   Skin:  Negative for wound.     Objective:     Vital Signs (Most Recent):  Temp: 97.6 °F (36.4 °C) (11/25/24 1105)  Pulse: (!) 121 (11/25/24 1105)  Resp: 17 (11/25/24 1105)  BP: (!) 127/51 (11/25/24 1105)  SpO2: 99 % (11/25/24 1105) Vital Signs (24h Range):  Temp:  [97.6 °F (36.4 °C)-99.9 °F (37.7 °C)] 97.6 °F (36.4 °C)  Pulse:  [] 121  Resp:  [12-28] 17  SpO2:  [84 %-100 %] 99 %  BP: (115-127)/(48-51) 127/51  Arterial Line BP: ()/(48-64) 127/57     Weight: (!) 148 kg (326 lb 4.5 oz)  Body mass index is 43.05 kg/m².    Estimated Creatinine Clearance: 26.3 mL/min (A) (based on SCr of 3.9 mg/dL (H)).     Physical Exam  Vitals and nursing note reviewed.   Constitutional:       Appearance: He is ill-appearing. He is not toxic-appearing.   HENT:      Head: Normocephalic.   Pulmonary:      Effort: Pulmonary effort is normal. No respiratory distress.   Abdominal:      General: There is distension.      Palpations: Abdomen is soft.      Tenderness: There is no abdominal tenderness.   Musculoskeletal:      Right lower leg: Edema present.      Left lower leg: Edema present.   Skin:     General: Skin is warm.      Findings: Bruising present.   Neurological:      Mental Status: He is alert.   Psychiatric:         Mood and Affect: Mood normal.          Significant Labs:   Microbiology Results (last 7 days)       Procedure Component Value Units Date/Time    Blood culture [[id number]] Collected: 11/22/24 0903    Order Status: Completed Specimen: Blood Updated: 11/25/24 1012     Blood Culture, Routine No Growth to date      No Growth to date      No Growth to date       No Growth to date    Blood culture [[id number]] Collected: 11/22/24 0904    Order Status: Completed Specimen: Blood Updated: 11/25/24 1012     Blood Culture, Routine No Growth to date      No Growth to date      No Growth to date      No Growth to date    Blood culture x two cultures. Draw prior to antibiotics. [[id number]]  (Abnormal) Collected: 11/20/24 1429    Order Status: Completed Specimen: Blood from Peripheral, Hand, Right Updated: 11/25/24 0804     Blood Culture, Routine Gram stain aer bottle: Gram positive cocci in clusters resembling Staph      Results called to and read back by:Crystal Navarrete RN 11/21/2024  23:02      Gram stain malik bottle: Gram positive rods      Results called to and read back by: Melissa Hernandez RN  11/25/2024      01:35      BREVUNDIMONAS SPECIES  further identified as b. diminuta group  Susceptibility pending        MICROCOCCUS LUTEUS  Organism is a probable contaminant      Narrative:      Aerobic and anaerobic    Rapid Organism ID by PCR (from Blood culture) [[id number]] Collected: 11/20/24 1429    Order Status: Completed Updated: 11/25/24 0247     Enterococcus faecalis Not Detected     Enterococcus faecium Not Detected     Listeria monocytogenes Not Detected     Staphylococcus spp. Not Detected     Staphylococcus aureus Not Detected     Staphylococcus epidermidis Not Detected     Staphylococcus lugdunensis Not Detected     Streptococcus species Not Detected     Streptococcus agalactiae Not Detected     Streptococcus pneumoniae Not Detected     Streptococcus pyogenes Not Detected     Acinetobacter calcoaceticus/baumannii complex Not Detected     Bacteroides fragilis Not Detected     Enterobacterales Not Detected     Enterobacter cloacae complex Not Detected     Escherichia coli Not Detected     Klebsiella aerogenes Not Detected     Klebsiella oxytoca Not Detected     Klebsiella pneumoniae group Not Detected     Proteus Not Detected     Salmonella sp Not Detected     Serratia  marcescens Not Detected     Haemophilus influenzae Not Detected     Neisseria meningtidis Not Detected     Pseudomonas aeruginosa Not Detected     Stenotrophomonas maltophilia Not Detected     Candida albicans Not Detected     Candida auris Not Detected     Candida glabrata Not Detected     Candida krusei Not Detected     Candida parapsilosis Not Detected     Candida tropicalis Not Detected     Cryptococcus neoformans/gattii Not Detected     CTX-M (ESBL ) Test Not Applicable     IMP (Carbapenem resistant) Test Not Applicable     KPC resistance gene (Carbapenem resistant) Test Not Applicable     mcr-1  Test Not Applicable     mec A/C  Test Not Applicable     mec A/C and MREJ (MRSA) gene Test Not Applicable     NDM (Carbapenem resistant) Test Not Applicable     OXA-48-like (Carbapenem resistant) Test Not Applicable     van A/B (VRE gene) Test Not Applicable     VIM (Carbapenem resistant) Test Not Applicable    Narrative:      Aerobic and anaerobic    Blood culture [[id number]] Collected: 11/24/24 1804    Order Status: Completed Specimen: Blood from Peripheral, Antecubital, Left Updated: 11/25/24 0115     Blood Culture, Routine No Growth to date    Blood culture [[id number]] Collected: 11/24/24 1804    Order Status: Completed Specimen: Blood from Peripheral, Antecubital, Right Updated: 11/25/24 0115     Blood Culture, Routine No Growth to date    Blood culture x two cultures. Draw prior to antibiotics. [[id number]]  (Abnormal) Collected: 11/20/24 1444    Order Status: Completed Specimen: Blood from Peripheral, Wrist, Left Updated: 11/23/24 1000     Blood Culture, Routine Gram stain aer bottle: Gram positive rods      Results called to and read back by:Crystal Navarrete RN 11/21/2024  21:58      GRAM-POSITIVE JANE  further identified as Lysinobacillus species      Narrative:      Aerobic and anaerobic    Urine culture [[id number]]  (Abnormal) Collected: 11/20/24 1801    Order Status: Completed Specimen: Urine  Updated: 11/22/24 0301     Urine Culture, Routine COAGULASE-NEGATIVE STAPHYLOCOCCUS SPECIES  10,000 - 49,999 cfu/ml  Susceptibility testing not routinely performed.      Narrative:      Specimen Source->Urine    MRSA/SA Rapid ID by PCR from Blood culture [[id number]] Collected: 11/20/24 1429    Order Status: Completed Updated: 11/22/24 0010     Staph aureus ID by PCR Negative     Methicillin Resistant ID by PCR Negative    Narrative:      Aerobic and anaerobic    Rapid Organism ID by PCR (from Blood culture) [[id number]] Collected: 11/20/24 1444    Order Status: Completed Updated: 11/21/24 2202     Enterococcus faecalis Not Detected     Enterococcus faecium Not Detected     Listeria monocytogenes Not Detected     Staphylococcus spp. Not Detected     Staphylococcus aureus Not Detected     Staphylococcus epidermidis Not Detected     Staphylococcus lugdunensis Not Detected     Streptococcus species Not Detected     Streptococcus agalactiae Not Detected     Streptococcus pneumoniae Not Detected     Streptococcus pyogenes Not Detected     Acinetobacter calcoaceticus/baumannii complex Not Detected     Bacteroides fragilis Not Detected     Enterobacterales Not Detected     Enterobacter cloacae complex Not Detected     Escherichia coli Not Detected     Klebsiella aerogenes Not Detected     Klebsiella oxytoca Not Detected     Klebsiella pneumoniae group Not Detected     Proteus Not Detected     Salmonella sp Not Detected     Serratia marcescens Not Detected     Haemophilus influenzae Not Detected     Neisseria meningtidis Not Detected     Pseudomonas aeruginosa Not Detected     Stenotrophomonas maltophilia Not Detected     Candida albicans Not Detected     Candida auris Not Detected     Candida glabrata Not Detected     Candida krusei Not Detected     Candida parapsilosis Not Detected     Candida tropicalis Not Detected     Cryptococcus neoformans/gattii Not Detected     CTX-M (ESBL ) Test Not Applicable     IMP  (Carbapenem resistant) Test Not Applicable     KPC resistance gene (Carbapenem resistant) Test Not Applicable     mcr-1  Test Not Applicable     mec A/C  Test Not Applicable     mec A/C and MREJ (MRSA) gene Test Not Applicable     NDM (Carbapenem resistant) Test Not Applicable     OXA-48-like (Carbapenem resistant) Test Not Applicable     van A/B (VRE gene) Test Not Applicable     VIM (Carbapenem resistant) Test Not Applicable    Narrative:      Aerobic and anaerobic    (rule out SBP) Gram stain [[id number]]     Order Status: No result Specimen: Ascites     (rule out SBP) Aerobic culture [[id number]]     Order Status: No result Specimen: Ascites     (rule out SBP) Culture, Anaerobic [[id number]]     Order Status: No result Specimen: Ascites     Clostridium difficile EIA [[id number]]     Order Status: Canceled Specimen: Stool     Stool culture [[id number]]     Order Status: No result Specimen: Stool             Significant Imaging: I have reviewed all pertinent imaging results/findings within the past 24 hours.

## 2024-11-25 NOTE — SUBJECTIVE & OBJECTIVE
Interval History: Completed 4 hours SLED tx overnight. Currently on levophed/vasopressin gtt to maintain MAP goal > 80-85. Minimal urinary output, 87cc charted over past 24 hours. Boo catheter in place.     Review of patient's allergies indicates:  No Known Allergies  Current Facility-Administered Medications   Medication Frequency    0.9%  NaCl infusion (CRRT USE ONLY) Continuous    albuterol-ipratropium 2.5 mg-0.5 mg/3 mL nebulizer solution 3 mL Q6H PRN    aluminum-magnesium hydroxide-simethicone 200-200-20 mg/5 mL suspension 30 mL QID PRN    ascorbic acid (vitamin C) tablet 500 mg Daily    cyanocobalamin tablet 250 mcg Daily    dextrose 10% bolus 125 mL 125 mL PRN    dextrose 10% bolus 250 mL 250 mL PRN    fludrocortisone tablet 100 mcg Daily    furosemide injection 120 mg BID    glucagon (human recombinant) injection 1 mg PRN    glucose chewable tablet 16 g PRN    glucose chewable tablet 24 g PRN    heparin (porcine) injection 7,500 Units Q8H    hydrocortisone sodium succinate injection 100 mg Q8H    lactulose 20 gram/30 mL solution Soln 30 g TID    magnesium sulfate 2g in water 50mL IVPB (premix) PRN    melatonin tablet 6 mg Nightly PRN    midodrine tablet 10 mg TID    mupirocin 2 % ointment BID    naloxone 0.4 mg/mL injection 0.02 mg PRN    NORepinephrine bitartrate-NaCl 32 mg/250 mL (128 mcg/mL) infusion Continuous    ondansetron injection 4 mg Q8H PRN    pantoprazole EC tablet 40 mg BID AC    piperacillin-tazobactam (ZOSYN) 4.5 g in D5W 100 mL IVPB (MB+) Q8H    simethicone chewable tablet 80 mg QID PRN    sodium chloride 0.9% flush 10 mL Q12H PRN    sodium chloride 0.9% flush 10 mL PRN    sodium phosphate 20.01 mmol in D5W 250 mL IVPB PRN    sodium phosphate 30 mmol in D5W 250 mL IVPB PRN    sodium phosphate 39.99 mmol in D5W 250 mL IVPB PRN    tamsulosin 24 hr capsule 0.4 mg QHS    vancomycin - pharmacy to dose pharmacy to manage frequency    vasopressin (PITRESSIN) 0.2 Units/mL in 0.9% NaCl 100 mL  infusion Continuous       Objective:     Vital Signs (Most Recent):  Temp: 97.6 °F (36.4 °C) (11/25/24 1105)  Pulse: (!) 121 (11/25/24 1105)  Resp: 17 (11/25/24 1105)  BP: (!) 127/51 (11/25/24 1105)  SpO2: 99 % (11/25/24 1105) Vital Signs (24h Range):  Temp:  [97.6 °F (36.4 °C)-99.9 °F (37.7 °C)] 97.6 °F (36.4 °C)  Pulse:  [] 121  Resp:  [12-28] 17  SpO2:  [84 %-100 %] 99 %  BP: (115-127)/(48-51) 127/51  Arterial Line BP: ()/(48-64) 127/57     Weight: (!) 148 kg (326 lb 4.5 oz) (11/22/24 0400)  Body mass index is 43.05 kg/m².  Body surface area is 2.76 meters squared.    I/O last 3 completed shifts:  In: 7613.3 [I.V.:7482.9; IV Piggyback:130.4]  Out: 3788 [Urine:187; Other:3601]     Physical Exam  Vitals and nursing note reviewed.   Constitutional:       General: He is awake. He is not in acute distress.     Appearance: He is obese. He is not ill-appearing or toxic-appearing.      Comments: Boo catheter in place   HENT:      Head: Normocephalic and atraumatic.   Eyes:      General: No scleral icterus.     Extraocular Movements: Extraocular movements intact.      Conjunctiva/sclera: Conjunctivae normal.   Cardiovascular:      Rate and Rhythm: Tachycardia present. Rhythm irregular.   Pulmonary:      Effort: Pulmonary effort is normal. No respiratory distress.   Abdominal:      General: There is distension.      Palpations: Abdomen is soft.      Tenderness: There is no abdominal tenderness. There is no guarding or rebound.   Musculoskeletal:         General: No swelling.      Right lower leg: Edema present.      Left lower leg: Edema present.      Comments: 2+ pitting edema in bilateral lower extremities, SCDs in place.    Skin:     General: Skin is warm.      Coloration: Skin is not jaundiced.   Neurological:      Mental Status: He is alert and oriented to person, place, and time.      Motor: No weakness.   Psychiatric:         Behavior: Behavior is cooperative.        Significant Labs:  CBC:   Recent  Labs   Lab 11/25/24 0245   WBC 6.85   RBC 3.25*   HGB 8.0*   HCT 24.7*   PLT 74*   MCV 76*   MCH 24.6*   MCHC 32.4     CMP:   Recent Labs   Lab 11/25/24 0245   *   CALCIUM 8.9   ALBUMIN 4.0   PROT 8.0   *   K 4.5   CO2 16*   CL 98   BUN 28*   CREATININE 3.9*   ALKPHOS 53   ALT 11   AST 18   BILITOT 4.1*     All labs within the past 24 hours have been reviewed.     Significant Imaging:  All imaging within the past 24 hours have been reviewed.

## 2024-11-25 NOTE — PROGRESS NOTES
[facility] - Medical ICU  Critical Care Medicine  Progress Note    Patient Name: Juan Carlos Yoo Sr.  MRN: [id number]  Admission Date: 11/20/2024  Hospital Length of Stay: 5 days  Code Status: Full Code  Attending Provider: Saad Rahman MD  Primary Care Provider: Nyla Rios FNP   Principal Problem: Decompensated cirrhosis    Subjective:     HPI:  Juan Carlos Yoo is a 69 male with PMH of alcoholic cirrhosis, esophageal varices, Afib on eliquis, and HTN who presents for c/o worsening diffuse swelling as well as R arm pain/erythema. He was recently hospitalized 1 month ago at [facility] for volume overload in the setting of decompensated cirrhosis. He was treated with IV diuresis and discharged with adjustment to his diuretics, currently on lasix 60mg BID and metolazone 2.5mg every other week as per family. Patient reports diffuse swelling of his upper and lower extremities in addition to distended abdomen and worsening dyspnea, which he believes is due to recent medication adjustment. Family states he is non-compliant with low sodium diet and fluid restriction. Family states he has been compliant with diuretics, but rom't taken eliquis for 3 days due to issue getting refill. He also reports scratching right arm on door frame 3-4 days ago which has become red and painful after wrapping in in bandage. He claims to be compliant with medications, but is a poor historian. He follows with hepatology, not currently active on transplant list. He states he hasn't consumed alcohol for at least 9 months. Has c/o chills, but denies fever, cough, nausea, vomiting, chest pain, dysuria, hematuria, blood in stool. Workup in ED remarkable for VS: T 97.6 HR 94 RR 22 BP 95/56 O2 sat 97% on RA. Hb 6.7, MCV 75, Plt 81, PT/INR 22.6/2.2, Na 128, K 6.1, BUN/Cr 49/5.7, Alb 2.8, AST/ALT 35/9, Ammonia 104, , Trop neg, LA 2.9, CXR: Cardiac size is enlarged similar to prior.  No large volume of pleural fluid noted although there is mild  blunting of the right costophrenic angle which may relate to a small amount of pleural fluid.  Suspected right basilar opacity, to be correlated clinically for infection. RUE venous doppler: No thrombus in central veins of the right upper extremity. Patient received vanc/zosyn and lasix 80mg IVP in ED and will be admitted to hospital medicine service for further management.     Pt was admitted to hospital medicine. Blood cultures positive for Gram-positive cocci and Gram-negative rods. ID has been consulted. S/p 2 units PRBC for Hemoglobin dropped 6.8 on 11/21. Pt was on Eliquis for atrial fibrillation prior to admission which was held.  Hepatology following patient's clinical course, but are not evaluating him for transplant at this time. Diuretics were held because of concern for HRS.  Patient was started on albumin and midodrine per Nephrology recommendations for HRS.  Renal function continued to worsen and recommendation per Nephrology to transfer to ICU for maintaining goal map over 85 on Levophed.  ICU was notified and patient to be assessed to be transferred to ICU.       Hospital/ICU Course:  69 y.o. male with history of EtOH use disorder, EtOH cirrhosis, HCC s/p y90, morbid obesity BMI 44, HTN, and Afib who presents with severe anasarca and JAE.      Appreciate hepatology and nephrology recommendations.  Diuretics were held because of concern for HRS.  Patient was started on albumin and midodrine per Nephrology recommendations for HRS.  Renal function continued to worsen and recommendation per Nephrology to transfer to ICU for maintaining goal map over 85 on Levophed.  ICU was notified and patient to be assessed to be transferred to ICU.     Patient also has Gram-positive cocci and Gram-negative rods in his blood now.  Possible sources could be got translocation and barrier related infection through skin as he has been significantly edematous and has been oozing.  Has been on vancomycin and Rocephin.  Id  was consulted this morning.  Repeat blood cultures were obtained.     Continues to have anemia.  Hemoglobin dropped on 11/20 and was given 1 unit of packed red blood cells.  Did not respond appropriately.  Hemoglobin dropped again to 6.8 on 11/21 and was given 1 unit of packed red blood cells.  Hemoglobin again on 11/22 is 7.2.  No reported melena or hematochezia.  Patient was on Eliquis for atrial fibrillation prior to admission which was held.  Given history of esophageal varices and portal hypertensive gastropathy whereas also could be component of slow bleeding, under production due to CKD and hemolysis while also with decompensated cirrhosis.  Started on Protonix IV b.i.d. and octreotide.     Also clarified with hepatology.  Given patient's current infection we will await ID recommendations however we will be challenging to consider transplant evaluation at this time.  Trend clinical course     Goal clarification with the patient and family.  Patient at this time wants to be full code and continue with Levophed in ICU.  They would consider discussion with palliative care in a day or so if things do not get better.     In MICU patient started on Levophed, however titration remained difficult given tachycardic response from the patient.     11/24 Central and arterial lines placed overnight and currently on levo and norepi. SLED today.        Interval History/Significant Events: naeon    Review of Systems-no interval change  Objective:     Vital Signs (Most Recent):  Temp: 97.9 °F (36.6 °C) (11/25/24 0705)  Pulse: (!) 131 (11/25/24 0705)  Resp: 15 (11/25/24 0705)  BP: (!) 115/48 (11/25/24 0705)  SpO2: 100 % (11/25/24 0705) Vital Signs (24h Range):  Temp:  [97.7 °F (36.5 °C)-99.9 °F (37.7 °C)] 97.9 °F (36.6 °C)  Pulse:  [] 131  Resp:  [12-28] 15  SpO2:  [84 %-100 %] 100 %  BP: (115)/(48) 115/48  Arterial Line BP: ()/(48-64) 128/54   Weight: (!) 148 kg (326 lb 4.5 oz)  Body mass index is 43.05  kg/m².      Intake/Output Summary (Last 24 hours) at 11/25/2024 0732  Last data filed at 11/25/2024 0700  Gross per 24 hour   Intake 4223.19 ml   Output 3688 ml   Net 535.19 ml          Physical Exam      Physical Exam  Constitutional:       General: He is not in acute distress.     Appearance: He is obese. He is ill-appearing (chronic). He is not toxic-appearing.   HENT:      Head: Normocephalic and atraumatic.   Eyes:      General: Scleral icterus present.      Extraocular Movements: Extraocular movements intact.      Conjunctiva/sclera: Conjunctivae normal.   Cardiovascular:      Rate and Rhythm: Normal rate. Rhythm irregular.      Heart sounds: Normal heart sounds.   Pulmonary:      Effort: Pulmonary effort is normal. No respiratory distress.      Breath sounds: No wheezing.   Abdominal:      General: Bowel sounds are normal. There is distension.      Palpations: Abdomen is soft.      Tenderness: There is no abdominal tenderness.      Hernia: A hernia (umbilical and L inguinal hernia) is present.   Musculoskeletal:         General: Normal range of motion.      Right upper arm: Swelling and edema present.      Left upper arm: Swelling and edema present.      Cervical back: Normal range of motion and neck supple.      Right lower leg: Edema present.      Left lower leg: Edema present.   Skin:     General: Skin is warm and dry.      Coloration: Skin is not jaundiced.      Findings: Erythema (RUE) present.   Neurological:      General: No focal deficit present.      Mental Status: He is alert and oriented to person, place, and time. He is confused.      Comments: Asterixis   Psychiatric:         Attention and Perception: He is inattentive.         Behavior: Behavior normal.   Vents:     Lines/Drains/Airways       Central Venous Catheter Line  Duration             Trialysis (Dialysis) Catheter 11/24/24 0441 right internal jugular 1 day              Drain  Duration                  Urethral Catheter 11/20/24 1802  Double-lumen 4 days              Arterial Line  Duration             Arterial Line 11/24/24 0317 Right Radial 1 day              Peripheral Intravenous Line  Duration                  Peripheral IV - Single Lumen 11/21/24 2300 20 G Right Breast 3 days         Peripheral IV - Single Lumen 11/22/24 1159 22 G Anterior;Right Upper Arm 2 days                  Significant Labs:    CBC/Anemia Profile:  Recent Labs   Lab 11/24/24  0503 11/25/24  0245   WBC 6.20 6.85   HGB 7.7* 8.0*   HCT 23.6* 24.7*   * 74*   MCV 76* 76*   RDW 21.4* 22.4*        Chemistries:  Recent Labs   Lab 11/24/24  0503 11/24/24  1742 11/24/24  2241 11/25/24  0245   * 131* 129* 129*   K 4.5 4.6 4.5 4.5   CL 99 98 99 98   CO2 15* 17* 18* 16*   BUN 57* 51* 37* 28*   CREATININE 7.4* 6.3* 4.8* 3.9*   CALCIUM 9.9 9.4 9.1 8.9   ALBUMIN 4.0 3.8 4.0 4.0   PROT 7.8  --   --  8.0   BILITOT 3.9*  --   --  4.1*   ALKPHOS 49  --   --  53   ALT 7*  --   --  11   AST 11  --   --  18   MG 2.5 2.3 2.3 2.1   PHOS 7.3* 6.2* 4.9* 4.0       All pertinent labs within the past 24 hours have been reviewed.    Significant Imaging:  I have reviewed all pertinent imaging results/findings within the past 24 hours.    ABG  Recent Labs   Lab 11/20/24  1542   PH 7.412   PO2 40   PCO2 34.7*   HCO3 22.1*   BE -2     Assessment/Plan:     Neuro  Encephalopathy, metabolic  AAOx4  Will assess for signs of encephalopathy     Cardiac/Vascular  Atrial fibrillation  Holding home Eliquis in the setting of recent low blood counts    Renal/  Stage 3a chronic kidney disease  --Nephrology following  --Strict intake and output  --Renally dose all medications  --Avoid nephrotoxic agents    JAE (acute kidney injury)  Baseline creatinine is 1.5    Cr trend 6.3 > 6.4     Plan  - JAE is worsening. Will continue current treatment  - Avoid nephrotoxins and renally dose meds for GFR listed above  - Monitor urine output, serial BMP, and adjust therapy as needed     - Etiology includes volume  "overload, obstruction, hypotension, possible HRS  - monitor peacock catheter and monitor strict I&O  - was started on midodrine and albumin.  Octreotide started 11/22/2024.  -appreciate nephrology recommendations and recommendation to give 1 dose of IV Lasix 120 mg.   - ICU consulted for Levo administration with MAP goal > 85 . Peripheral Levophed ordered, titration difficult due to tachycardic response, will continue attempting to meet MAP goal however this may be difficult given the patient has Afib.  - Stress dosing with Fludrocortisone and Hydrocortisone as HRS patients tend to have AI      ID  Severe sepsis  This patient does have evidence of infective focus  My overall impression is sepsis.  Source: Abdominal  Antibiotics given-   Antibiotics (72h ago, onward)      Start     Stop Route Frequency Ordered    11/21/24 2100  mupirocin 2 % ointment         11/26/24 2059 Nasl 2 times daily 11/21/24 1329    11/20/24 2100  cefTRIAXone injection 2 g         -- IV Every 24 hours (non-standard times) 11/20/24 1848    11/20/24 1722  vancomycin - pharmacy to dose  (vancomycin IVPB (PEDS and ADULTS))        Placed in "And" Linked Group    -- IV pharmacy to manage frequency 11/20/24 1623          Latest lactate reviewed-  Recent Labs   Lab 11/20/24  1935 11/20/24  2210 11/22/24  0903   LACTATE  --    < > 1.4   POCLAC 3.42*  --   --     < > = values in this interval not displayed.     Organ dysfunction indicated by Acute kidney injury    Fluid challenge Contraindicated- Fluid bolus is contraindicated in this patient due to End Stage Liver Disease     Post- resuscitation assessment No - Post resuscitation assessment not needed       Will Not start Pressors- Levophed for MAP of 65  Source control achieved by: antibiotics    Hematology  Thrombocytopenia  --Daily CBC  --Transfuse for PLT<10k, or PLT<50K with active bleeding  --Monitor for signs and symptoms of bleeding    Coagulopathy  End Stage Liver Disease precipitated " coagulopathy  -heparin ppx    Endocrine  Hyponatremia  Likely dilutional secondary to end stage liver disease and HRS    Will monitor for S&S of hyponatremia and correct when clinically appropriate.     GI  * Decompensated cirrhosis  MELD 3.0: 33 at 11/25/2024  2:45 AM  MELD-Na: 33 at 11/25/2024  2:45 AM  Calculated from:  Serum Creatinine: 3.9 mg/dL (Using max of 3 mg/dL) at 11/25/2024  2:45 AM  Serum Sodium: 129 mmol/L at 11/25/2024  2:45 AM  Total Bilirubin: 4.1 mg/dL at 11/25/2024  2:45 AM  Serum Albumin: 4 g/dL (Using max of 3.5 g/dL) at 11/25/2024  2:45 AM  INR(ratio): 1.7 at 11/23/2024  3:58 AM  Age at listing (hypothetical): 69 years  Sex: Male at 11/25/2024  2:45 AM    --Hepatology following, appreciate recs                Critical Care Daily Checklist:     A: Awake: RASS Goal/Actual Goal:    Actual:     B: Spontaneous Breathing Trial Performed?     C: SAT & SBT Coordinated?  N/A                      D: Delirium: CAM-ICU     E: Early Mobility Performed? No   F: Feeding Goal:    Status:               Current Diet Order   Procedures    Diet Renal Fluid - 1500mL       Order Specific Question:   Fluid restriction:       Answer:   Fluid - 1500mL      AS: Analgesia/Sedation None    T: Thromboembolic Prophylaxis heparin   H: HOB > 300 Yes   U: Stress Ulcer Prophylaxis (if needed) Pantoprazole 40 IV   G: Glucose Control stable   B: Bowel Function Will titrate lactulose for greater occurence   I: Indwelling Catheter (Lines & Boo) Necessity Peripheral IV, CVC, A line   D: De-escalation of Antimicrobials/Pharmacotherapies Consider d/c today given ID recs     Plan for the day/ETD Titrate to MAP >85     Code Status:  Family/Goals of Care: Full Code  Will discuss GOC if MAP goal deemed unattainable        Critical secondary to hepatorenal syndrome      Critical care was time spent personally by me on the following activities: development of treatment plan with patient or surrogate and bedside caregivers, discussions  with consultants, evaluation of patient's response to treatment, examination of patient, ordering and performing treatments and interventions, ordering and review of laboratory studies, ordering and review of radiographic studies, pulse oximetry, re-evaluation of patient's condition. This critical care time did not overlap with that of any other provider or involve time for any procedures.     Livier Macario MD  Critical Care Medicine  [facility] - [facility]

## 2024-11-25 NOTE — PLAN OF CARE
MICU DAILY GOALS     Family/Goals of care/Code Status   Code Status: Full Code    24H Vital Sign Range  Temp:  [97.6 °F (36.4 °C)-98.8 °F (37.1 °C)]   Pulse:  [111-141]   Resp:  [12-28]   BP: (115-138)/(48-56)   SpO2:  [84 %-100 %]   Arterial Line BP: (118-141)/(48-64)      Shift Events (include procedures and significant events)   No acute events throughout shift. Administered medications per orders, pt tolerated well. Strict I and O maintained. MAP goal of 80-85 maintained.    AWAKE RASS: Goal - RASS Goal: 0-->alert and calm  Actual - RASS (Pedraza Agitation-Sedation Scale): alert and calm    Restraint necessity: Not necessary   BREATHE SBT: Not intubated    Coordinate A & B, analgesics/sedatives Pain: managed   SAT: Not intubated   Delirium CAM-ICU: Overall CAM-ICU: Negative   Early(intubated/ Progressive (non-intubated) Mobility MOVE Screen (INTUBATED ONLY): Not intubated    Activity: Activity Management: Arm raise - L1, Rolling - L1   Feeding/Nutrition Diet order: Diet/Nutrition Received: regular, Specialty Diet/Nutrition Received: renal diet   Thrombus DVT prophylaxis: VTE Core Measure: Pharmacological prophylaxis initiated/maintained   HOB Elevation Head of Bed (HOB) Positioning: HOB at 30 degrees   Ulcer Prophylaxis GI: yes   Glucose control managed Glycemic Management: blood glucose monitored   Skin Skin assessment:     Sacrum intact/not altered? Yes  Heels intact/not altered? Yes  Surgical wound? No    CHECK ONE!   (no altered skin or altered skin) and sub boxes:  [x] No Altered Skin Integrity Present    [x]Prevention Measures Documented    [] Altered Skin Integrity Present or Discovered   [] LDA present in EPIC, daily doc completed              [] LDA added if not in EPIC (describe wound).                    When describing wound, do not stage, use descriptive words only.    [] Wound Image Taken (required on admit,                   transfer/discharge and every Tuesday)    Wound Care Consulted? No    Bowel Function no issues    Indwelling Catheter Necessity      Urethral Catheter 11/20/24 0034 Double-lumen-Reason for Continuing Urinary Catheterization: Urinary retention       Condom Catheter   De-escalation Antibiotics No        VS and assessment per flow sheet, patient progressing towards goals as tolerated, plan of care reviewed with family, all concerns addressed, will continue to monitor.

## 2024-11-25 NOTE — SUBJECTIVE & OBJECTIVE
Interval History/Significant Events: naeon    Review of Systems-no interval change  Objective:     Vital Signs (Most Recent):  Temp: 97.9 °F (36.6 °C) (11/25/24 0705)  Pulse: (!) 131 (11/25/24 0705)  Resp: 15 (11/25/24 0705)  BP: (!) 115/48 (11/25/24 0705)  SpO2: 100 % (11/25/24 0705) Vital Signs (24h Range):  Temp:  [97.7 °F (36.5 °C)-99.9 °F (37.7 °C)] 97.9 °F (36.6 °C)  Pulse:  [] 131  Resp:  [12-28] 15  SpO2:  [84 %-100 %] 100 %  BP: (115)/(48) 115/48  Arterial Line BP: ()/(48-64) 128/54   Weight: (!) 148 kg (326 lb 4.5 oz)  Body mass index is 43.05 kg/m².      Intake/Output Summary (Last 24 hours) at 11/25/2024 0732  Last data filed at 11/25/2024 0700  Gross per 24 hour   Intake 4223.19 ml   Output 3688 ml   Net 535.19 ml          Physical Exam      Physical Exam  Constitutional:       General: He is not in acute distress.     Appearance: He is obese. He is ill-appearing (chronic). He is not toxic-appearing.   HENT:      Head: Normocephalic and atraumatic.   Eyes:      General: Scleral icterus present.      Extraocular Movements: Extraocular movements intact.      Conjunctiva/sclera: Conjunctivae normal.   Cardiovascular:      Rate and Rhythm: Normal rate. Rhythm irregular.      Heart sounds: Normal heart sounds.   Pulmonary:      Effort: Pulmonary effort is normal. No respiratory distress.      Breath sounds: No wheezing.   Abdominal:      General: Bowel sounds are normal. There is distension.      Palpations: Abdomen is soft.      Tenderness: There is no abdominal tenderness.      Hernia: A hernia (umbilical and L inguinal hernia) is present.   Musculoskeletal:         General: Normal range of motion.      Right upper arm: Swelling and edema present.      Left upper arm: Swelling and edema present.      Cervical back: Normal range of motion and neck supple.      Right lower leg: Edema present.      Left lower leg: Edema present.   Skin:     General: Skin is warm and dry.      Coloration: Skin is  not jaundiced.      Findings: Erythema (RUE) present.   Neurological:      General: No focal deficit present.      Mental Status: He is alert and oriented to person, place, and time. He is confused.      Comments: Asterixis   Psychiatric:         Attention and Perception: He is inattentive.         Behavior: Behavior normal.   Vents:     Lines/Drains/Airways       Central Venous Catheter Line  Duration             Trialysis (Dialysis) Catheter 11/24/24 0441 right internal jugular 1 day              Drain  Duration                  Urethral Catheter 11/20/24 1802 Double-lumen 4 days              Arterial Line  Duration             Arterial Line 11/24/24 0317 Right Radial 1 day              Peripheral Intravenous Line  Duration                  Peripheral IV - Single Lumen 11/21/24 2300 20 G Right Breast 3 days         Peripheral IV - Single Lumen 11/22/24 1159 22 G Anterior;Right Upper Arm 2 days                  Significant Labs:    CBC/Anemia Profile:  Recent Labs   Lab 11/24/24  0503 11/25/24  0245   WBC 6.20 6.85   HGB 7.7* 8.0*   HCT 23.6* 24.7*   * 74*   MCV 76* 76*   RDW 21.4* 22.4*        Chemistries:  Recent Labs   Lab 11/24/24  0503 11/24/24  1742 11/24/24  2241 11/25/24  0245   * 131* 129* 129*   K 4.5 4.6 4.5 4.5   CL 99 98 99 98   CO2 15* 17* 18* 16*   BUN 57* 51* 37* 28*   CREATININE 7.4* 6.3* 4.8* 3.9*   CALCIUM 9.9 9.4 9.1 8.9   ALBUMIN 4.0 3.8 4.0 4.0   PROT 7.8  --   --  8.0   BILITOT 3.9*  --   --  4.1*   ALKPHOS 49  --   --  53   ALT 7*  --   --  11   AST 11  --   --  18   MG 2.5 2.3 2.3 2.1   PHOS 7.3* 6.2* 4.9* 4.0       All pertinent labs within the past 24 hours have been reviewed.    Significant Imaging:  I have reviewed all pertinent imaging results/findings within the past 24 hours.

## 2024-11-25 NOTE — PROGRESS NOTES
Pharmacokinetic Assessment Follow Up: IV Vancomycin    Vancomycin serum concentration assessment(s):    The random level was drawn correctly and can be used to guide therapy at this time. The measurement is within the desired definitive target range of 10 to 20 mcg/mL.    Patient in JAE, received 10 hour SLED yesterday  Plan for another sled overnight    Vancomycin Regimen Plan:    Vancomycin 500 mg IV once this AM  Will give another 500 mg IV once with plans for SLED again tonight    Re-dose when the random level is less than 20 mcg/mL, next level to be drawn 2 hours after the conclusion of SLED. Will place level once session is completed    Drug levels (last 3 results):  Recent Labs   Lab Result Units 11/23/24  0358 11/24/24  0503 11/25/24  0245   Vancomycin, Random ug/mL 16.2 17.6 13.7       Pharmacy will continue to follow and monitor vancomycin.    Please contact pharmacy at extension [phone] for questions regarding this assessment.    Thank you for the consult,   Iliana Marin       Patient brief summary:  Juan Carlos Yoo Sr. is a 69 y.o. male initiated on antimicrobial therapy with IV Vancomycin for treatment of bacteremia    The patient's current regimen is pulse dose with JAE    Drug Allergies:   Review of patient's allergies indicates:  No Known Allergies    Actual Body Weight:   148 kg    Renal Function:   Estimated Creatinine Clearance: 20.9 mL/min (A) (based on SCr of 4.9 mg/dL (H)).,     Dialysis Method (if applicable):  SLED    CBC (last 72 hours):  Recent Labs   Lab Result Units 11/23/24  0358 11/24/24  0503 11/25/24  0245   WBC K/uL 4.97 6.20 6.85   Hemoglobin g/dL 7.7* 7.7* 8.0*   Hematocrit % 22.8* 23.6* 24.7*   Platelets K/uL 77* 120* 74*   Gran % % 93.6* 86.8* 84.2*   Lymph % % 4.0* 4.2* 2.8*   Mono % % 2.0* 8.4 12.6   Eosinophil % % 0.0 0.0 0.0   Basophil % % 0.0 0.0 0.0   Differential Method  Automated Automated Automated       Metabolic Panel (last 72 hours):  Recent Labs   Lab Result Units  11/23/24  0358 11/24/24  0503 11/24/24  1742 11/24/24  2241 11/25/24  0245 11/25/24  0827 11/25/24  1356   Sodium mmol/L 130* 129* 131* 129* 129*  --  128*   Sodium, Urine mmol/L  --   --   --   --   --  14*  --    Potassium mmol/L 5.0 4.5 4.6 4.5 4.5  --  4.3   Chloride mmol/L 95 99 98 99 98  --  98   Chloride, Urine mmol/L  --   --   --   --   --  <20*  --    CO2 mmol/L 20* 15* 17* 18* 16*  --  16*   Glucose mg/dL 154* 136* 166* 157* 146*  --  172*   BUN mg/dL 58* 57* 51* 37* 28*  --  36*   Creatinine mg/dL 6.5* 7.4* 6.3* 4.8* 3.9*  --  4.9*   Albumin g/dL 4.1 4.0 3.8 4.0 4.0  --  3.7   Total Bilirubin mg/dL 5.6* 3.9*  --   --  4.1*  --   --    Alkaline Phosphatase U/L 48 49  --   --  53  --   --    AST U/L 8* 11  --   --  18  --   --    ALT U/L 7* 7*  --   --  11  --   --    Magnesium mg/dL 2.6 2.5 2.3 2.3 2.1  --  2.2   Phosphorus mg/dL 6.6* 7.3* 6.2* 4.9* 4.0  --  4.9*       Vancomycin Administrations:  vancomycin given in the last 96 hours                     vancomycin (VANCOCIN) 500 mg in D5W 100 mL IVPB (MB+) (mg) 500 mg New Bag 11/25/24 0958    vancomycin (VANCOCIN) 500 mg in D5W 100 mL IVPB (MB+) (mg) 500 mg New Bag 11/24/24 0807    vancomycin (VANCOCIN) 500 mg in D5W 100 mL IVPB (MB+) (mg) 500 mg New Bag 11/23/24 0910    vancomycin (VANCOCIN) 500 mg in D5W 100 mL IVPB (MB+) (mg) 500 mg New Bag 11/22/24 1101                    Microbiologic Results:  Microbiology Results (last 7 days)       Procedure Component Value Units Date/Time    Blood culture [[id number]] Collected: 11/22/24 0903    Order Status: Completed Specimen: Blood Updated: 11/25/24 1012     Blood Culture, Routine No Growth to date      No Growth to date      No Growth to date      No Growth to date    Blood culture [[id number]] Collected: 11/22/24 0904    Order Status: Completed Specimen: Blood Updated: 11/25/24 1012     Blood Culture, Routine No Growth to date      No Growth to date      No Growth to date      No Growth to date    Blood  culture x two cultures. Draw prior to antibiotics. [[id number]]  (Abnormal) Collected: 11/20/24 1429    Order Status: Completed Specimen: Blood from Peripheral, Hand, Right Updated: 11/25/24 0804     Blood Culture, Routine Gram stain aer bottle: Gram positive cocci in clusters resembling Staph      Results called to and read back by:Crystal Navarrete RN 11/21/2024  23:02      Gram stain malik bottle: Gram positive rods      Results called to and read back by: Melissa Hernandez RN  11/25/2024      01:35      BREVUNDIMONAS SPECIES  further identified as b. diminuta group  Susceptibility pending        MICROCOCCUS LUTEUS  Organism is a probable contaminant      Narrative:      Aerobic and anaerobic    Rapid Organism ID by PCR (from Blood culture) [[id number]] Collected: 11/20/24 1429    Order Status: Completed Updated: 11/25/24 0247     Enterococcus faecalis Not Detected     Enterococcus faecium Not Detected     Listeria monocytogenes Not Detected     Staphylococcus spp. Not Detected     Staphylococcus aureus Not Detected     Staphylococcus epidermidis Not Detected     Staphylococcus lugdunensis Not Detected     Streptococcus species Not Detected     Streptococcus agalactiae Not Detected     Streptococcus pneumoniae Not Detected     Streptococcus pyogenes Not Detected     Acinetobacter calcoaceticus/baumannii complex Not Detected     Bacteroides fragilis Not Detected     Enterobacterales Not Detected     Enterobacter cloacae complex Not Detected     Escherichia coli Not Detected     Klebsiella aerogenes Not Detected     Klebsiella oxytoca Not Detected     Klebsiella pneumoniae group Not Detected     Proteus Not Detected     Salmonella sp Not Detected     Serratia marcescens Not Detected     Haemophilus influenzae Not Detected     Neisseria meningtidis Not Detected     Pseudomonas aeruginosa Not Detected     Stenotrophomonas maltophilia Not Detected     Candida albicans Not Detected     Candida auris Not Detected      Eliane glabrata Not Detected     Candida krusei Not Detected     Candida parapsilosis Not Detected     Candida tropicalis Not Detected     Cryptococcus neoformans/gattii Not Detected     CTX-M (ESBL ) Test Not Applicable     IMP (Carbapenem resistant) Test Not Applicable     KPC resistance gene (Carbapenem resistant) Test Not Applicable     mcr-1  Test Not Applicable     mec A/C  Test Not Applicable     mec A/C and MREJ (MRSA) gene Test Not Applicable     NDM (Carbapenem resistant) Test Not Applicable     OXA-48-like (Carbapenem resistant) Test Not Applicable     van A/B (VRE gene) Test Not Applicable     VIM (Carbapenem resistant) Test Not Applicable    Narrative:      Aerobic and anaerobic    Blood culture [[id number]] Collected: 11/24/24 1804    Order Status: Completed Specimen: Blood from Peripheral, Antecubital, Left Updated: 11/25/24 0115     Blood Culture, Routine No Growth to date    Blood culture [[id number]] Collected: 11/24/24 1804    Order Status: Completed Specimen: Blood from Peripheral, Antecubital, Right Updated: 11/25/24 0115     Blood Culture, Routine No Growth to date    Blood culture x two cultures. Draw prior to antibiotics. [[id number]]  (Abnormal) Collected: 11/20/24 1444    Order Status: Completed Specimen: Blood from Peripheral, Wrist, Left Updated: 11/23/24 1000     Blood Culture, Routine Gram stain aer bottle: Gram positive rods      Results called to and read back by:Crystal Navarrete RN 11/21/2024  21:58      GRAM-POSITIVE JANE  further identified as Lysinobacillus species      Narrative:      Aerobic and anaerobic    Urine culture [[id number]]  (Abnormal) Collected: 11/20/24 1801    Order Status: Completed Specimen: Urine Updated: 11/22/24 0301     Urine Culture, Routine COAGULASE-NEGATIVE STAPHYLOCOCCUS SPECIES  10,000 - 49,999 cfu/ml  Susceptibility testing not routinely performed.      Narrative:      Specimen Source->Urine    MRSA/SA Rapid ID by PCR from Blood culture  [[id number]] Collected: 11/20/24 1429    Order Status: Completed Updated: 11/22/24 0010     Staph aureus ID by PCR Negative     Methicillin Resistant ID by PCR Negative    Narrative:      Aerobic and anaerobic    Rapid Organism ID by PCR (from Blood culture) [[id number]] Collected: 11/20/24 1444    Order Status: Completed Updated: 11/21/24 2202     Enterococcus faecalis Not Detected     Enterococcus faecium Not Detected     Listeria monocytogenes Not Detected     Staphylococcus spp. Not Detected     Staphylococcus aureus Not Detected     Staphylococcus epidermidis Not Detected     Staphylococcus lugdunensis Not Detected     Streptococcus species Not Detected     Streptococcus agalactiae Not Detected     Streptococcus pneumoniae Not Detected     Streptococcus pyogenes Not Detected     Acinetobacter calcoaceticus/baumannii complex Not Detected     Bacteroides fragilis Not Detected     Enterobacterales Not Detected     Enterobacter cloacae complex Not Detected     Escherichia coli Not Detected     Klebsiella aerogenes Not Detected     Klebsiella oxytoca Not Detected     Klebsiella pneumoniae group Not Detected     Proteus Not Detected     Salmonella sp Not Detected     Serratia marcescens Not Detected     Haemophilus influenzae Not Detected     Neisseria meningtidis Not Detected     Pseudomonas aeruginosa Not Detected     Stenotrophomonas maltophilia Not Detected     Candida albicans Not Detected     Candida auris Not Detected     Candida glabrata Not Detected     Candida krusei Not Detected     Candida parapsilosis Not Detected     Candida tropicalis Not Detected     Cryptococcus neoformans/gattii Not Detected     CTX-M (ESBL ) Test Not Applicable     IMP (Carbapenem resistant) Test Not Applicable     KPC resistance gene (Carbapenem resistant) Test Not Applicable     mcr-1  Test Not Applicable     mec A/C  Test Not Applicable     mec A/C and MREJ (MRSA) gene Test Not Applicable     NDM (Carbapenem resistant)  Test Not Applicable     OXA-48-like (Carbapenem resistant) Test Not Applicable     van A/B (VRE gene) Test Not Applicable     VIM (Carbapenem resistant) Test Not Applicable    Narrative:      Aerobic and anaerobic    (rule out SBP) Gram stain [[id number]]     Order Status: No result Specimen: Ascites     (rule out SBP) Aerobic culture [[id number]]     Order Status: No result Specimen: Ascites     (rule out SBP) Culture, Anaerobic [[id number]]     Order Status: No result Specimen: Ascites     Clostridium difficile EIA [[id number]]     Order Status: Canceled Specimen: Stool     Stool culture [[id number]]     Order Status: No result Specimen: Stool

## 2024-11-25 NOTE — ASSESSMENT & PLAN NOTE
JAE is likely due to pre-renal azotemia due to intravascular volume depletion secondary to decompensated hepatic cirrhosis with volume overload and acute tubular necrosis caused by hemodynamic instability, renal hypoperfusion, possible sepsis. Concerns of hepatorenal syndrome. Hx of CKD3a with baseline creatinine is  1.2-1.4 . Most recent creatinine and eGFR are listed below.  Recent Labs     11/24/24  1742 11/24/24  2241 11/25/24  0245   CREATININE 6.3* 4.8* 3.9*   EGFRNORACEVR 8.8* 12.2* 15.7*       Plan/Recommendations;  Keep MAP goals > 80-85 mmHg, continue levophed and vasopressin for blood pressure support  Tolerated initial session of SLED 11/25  Anticipate SLED again overnight for removal of uremic toxins and volume control, consent obtained & placed in patient chart  S/p RIJ trialysis line placement 11/24  F/u RFP Q8H    - Avoid nephrotoxins and renally dose meds for GFR listed above  - Monitor urine output, serial BMP, and adjust therapy as needed  - Retroperitoneal ultrasound c/w bilateral CKD, showed questionable 6 mm nonobstructing stone in the left upper pole without evidence of hydronephrosis or solid renal mass.

## 2024-11-25 NOTE — ASSESSMENT & PLAN NOTE
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 Blood cultures from admission with B. Diminuta, micrococcus luteus, lysinobacillus species. Seen by ID previously who recommended stopping antibiotics due to concern these were contaminants. Repeat blood cultures have been no growth. Has since been transferred to ICU with increased pressor requirement. Blood cultures repeated on 11/24 are no growth x 24 hours.     Recommendations  - can continue vancomycin goal trough 15-20 mcg/ml until 11/24 blood cultures no growth x 48 hours  - continue pip-tazo 4.5 q8 hours for B. Diminuta, will follow susceptibilities

## 2024-11-25 NOTE — PROGRESS NOTES
[facility] - Medical ICU  Nephrology  Progress Note    Patient Name: Juan Carlos Yoo Sr.  MRN: [id number]  Admission Date: 11/20/2024  Hospital Length of Stay: 5 days  Attending Provider: Saad Rahman MD   Primary Care Physician: Nyla Rios FNP  Principal Problem:Decompensated cirrhosis    Subjective:     HPI: Juan Carlos Yoo is a 69 year old male with hx of decompensated hepatic cirrhosis due to alcohol use, HCC s/p Y90, esophageal varices, persistent afib on eliquis, HTN, CKD3a (baseline creatinine 1.2-1.4) admitted on 11/20 for sepsis likely 2/2 SSTI involving RUE and decompensated hepatic cirrhosis with signs of volume overload. Recent admission 10/4 at [facility] for decompensated hepatic cirrhosis where he was discharged with oral diuretic regimen including furosemide 60 mg BID and metolazone 2.5 mg daily, low salt diet with fluid restriction. It is unclear if patient is adherent to these medications or lifestyle modifications. W/u notable for anemia with hb 6.7 s/p 1 unit pRBC 11/20 and JAE on CKD w elevated BUN 49/creatinine 5.9, hyperkalemia (K 6.1>5.1 after shifting), bicarb 18, elevated lactate up to 3.5. Nephrology consulted for JAE with c/o HRS    Interval History: Completed 4 hours SLED tx overnight. Currently on levophed/vasopressin gtt to maintain MAP goal > 80-85. Minimal urinary output, 87cc charted over past 24 hours. Boo catheter in place.     Review of patient's allergies indicates:  No Known Allergies  Current Facility-Administered Medications   Medication Frequency    0.9%  NaCl infusion (CRRT USE ONLY) Continuous    albuterol-ipratropium 2.5 mg-0.5 mg/3 mL nebulizer solution 3 mL Q6H PRN    aluminum-magnesium hydroxide-simethicone 200-200-20 mg/5 mL suspension 30 mL QID PRN    ascorbic acid (vitamin C) tablet 500 mg Daily    cyanocobalamin tablet 250 mcg Daily    dextrose 10% bolus 125 mL 125 mL PRN    dextrose 10% bolus 250 mL 250 mL PRN    fludrocortisone tablet 100 mcg Daily    furosemide  injection 120 mg BID    glucagon (human recombinant) injection 1 mg PRN    glucose chewable tablet 16 g PRN    glucose chewable tablet 24 g PRN    heparin (porcine) injection 7,500 Units Q8H    hydrocortisone sodium succinate injection 100 mg Q8H    lactulose 20 gram/30 mL solution Soln 30 g TID    magnesium sulfate 2g in water 50mL IVPB (premix) PRN    melatonin tablet 6 mg Nightly PRN    midodrine tablet 10 mg TID    mupirocin 2 % ointment BID    naloxone 0.4 mg/mL injection 0.02 mg PRN    NORepinephrine bitartrate-NaCl 32 mg/250 mL (128 mcg/mL) infusion Continuous    ondansetron injection 4 mg Q8H PRN    pantoprazole EC tablet 40 mg BID AC    piperacillin-tazobactam (ZOSYN) 4.5 g in D5W 100 mL IVPB (MB+) Q8H    simethicone chewable tablet 80 mg QID PRN    sodium chloride 0.9% flush 10 mL Q12H PRN    sodium chloride 0.9% flush 10 mL PRN    sodium phosphate 20.01 mmol in D5W 250 mL IVPB PRN    sodium phosphate 30 mmol in D5W 250 mL IVPB PRN    sodium phosphate 39.99 mmol in D5W 250 mL IVPB PRN    tamsulosin 24 hr capsule 0.4 mg QHS    vancomycin - pharmacy to dose pharmacy to manage frequency    vasopressin (PITRESSIN) 0.2 Units/mL in 0.9% NaCl 100 mL infusion Continuous       Objective:     Vital Signs (Most Recent):  Temp: 97.6 °F (36.4 °C) (11/25/24 1105)  Pulse: (!) 121 (11/25/24 1105)  Resp: 17 (11/25/24 1105)  BP: (!) 127/51 (11/25/24 1105)  SpO2: 99 % (11/25/24 1105) Vital Signs (24h Range):  Temp:  [97.6 °F (36.4 °C)-99.9 °F (37.7 °C)] 97.6 °F (36.4 °C)  Pulse:  [] 121  Resp:  [12-28] 17  SpO2:  [84 %-100 %] 99 %  BP: (115-127)/(48-51) 127/51  Arterial Line BP: ()/(48-64) 127/57     Weight: (!) 148 kg (326 lb 4.5 oz) (11/22/24 0400)  Body mass index is 43.05 kg/m².  Body surface area is 2.76 meters squared.    I/O last 3 completed shifts:  In: 7613.3 [I.V.:7482.9; IV Piggyback:130.4]  Out: 3788 [Urine:187; Other:3601]     Physical Exam  Vitals and nursing note reviewed.   Constitutional:        General: He is awake. He is not in acute distress.     Appearance: He is obese. He is not ill-appearing or toxic-appearing.      Comments: Boo catheter in place   HENT:      Head: Normocephalic and atraumatic.   Eyes:      General: No scleral icterus.     Extraocular Movements: Extraocular movements intact.      Conjunctiva/sclera: Conjunctivae normal.   Cardiovascular:      Rate and Rhythm: Tachycardia present. Rhythm irregular.   Pulmonary:      Effort: Pulmonary effort is normal. No respiratory distress.   Abdominal:      General: There is distension.      Palpations: Abdomen is soft.      Tenderness: There is no abdominal tenderness. There is no guarding or rebound.   Musculoskeletal:         General: No swelling.      Right lower leg: Edema present.      Left lower leg: Edema present.      Comments: 2+ pitting edema in bilateral lower extremities, SCDs in place.    Skin:     General: Skin is warm.      Coloration: Skin is not jaundiced.   Neurological:      Mental Status: He is alert and oriented to person, place, and time.      Motor: No weakness.   Psychiatric:         Behavior: Behavior is cooperative.        Significant Labs:  CBC:   Recent Labs   Lab 11/25/24  0245   WBC 6.85   RBC 3.25*   HGB 8.0*   HCT 24.7*   PLT 74*   MCV 76*   MCH 24.6*   MCHC 32.4     CMP:   Recent Labs   Lab 11/25/24  0245   *   CALCIUM 8.9   ALBUMIN 4.0   PROT 8.0   *   K 4.5   CO2 16*   CL 98   BUN 28*   CREATININE 3.9*   ALKPHOS 53   ALT 11   AST 18   BILITOT 4.1*     All labs within the past 24 hours have been reviewed.     Significant Imaging:  All imaging within the past 24 hours have been reviewed.   Assessment/Plan:     Renal/  Stage 3a chronic kidney disease  Creatinine stable for now. BMP reviewed- noted Estimated Creatinine Clearance: 26.3 mL/min (A) (based on SCr of 3.9 mg/dL (H)). according to latest data. Based on current GFR, CKD stage is stage 3 - GFR 30-59.  Monitor UOP and serial BMP and adjust  therapy as needed. Renally dose meds. Avoid nephrotoxic medications and procedures. See JAE (acute kidney injury).      JAE (acute kidney injury)  JAE is likely due to pre-renal azotemia due to intravascular volume depletion secondary to decompensated hepatic cirrhosis with volume overload and acute tubular necrosis caused by hemodynamic instability, renal hypoperfusion, possible sepsis. Concerns of hepatorenal syndrome. Hx of CKD3a with baseline creatinine is  1.2-1.4 . Most recent creatinine and eGFR are listed below.  Recent Labs     11/24/24  1742 11/24/24  2241 11/25/24  0245   CREATININE 6.3* 4.8* 3.9*   EGFRNORACEVR 8.8* 12.2* 15.7*       Plan/Recommendations;  Keep MAP goals > 80-85 mmHg, continue levophed and vasopressin for blood pressure support  Tolerated initial session of SLED 11/25  Anticipate SLED again overnight for removal of uremic toxins and volume control, consent obtained & placed in patient chart  S/p RIJ trialysis line placement 11/24  F/u RFP Q8H    - Avoid nephrotoxins and renally dose meds for GFR listed above  - Monitor urine output, serial BMP, and adjust therapy as needed  - Retroperitoneal ultrasound c/w bilateral CKD, showed questionable 6 mm nonobstructing stone in the left upper pole without evidence of hydronephrosis or solid renal mass.      Thank you for your consult. We will follow-up with patient. Please contact us if you have any additional questions.    Margarita Paredes MD  Nephrology  [facility] - Medical ICU

## 2024-11-25 NOTE — ASSESSMENT & PLAN NOTE
Creatinine stable for now. BMP reviewed- noted Estimated Creatinine Clearance: 26.3 mL/min (A) (based on SCr of 3.9 mg/dL (H)). according to latest data. Based on current GFR, CKD stage is stage 3 - GFR 30-59.  Monitor UOP and serial BMP and adjust therapy as needed. Renally dose meds. Avoid nephrotoxic medications and procedures. See JAE (acute kidney injury).

## 2024-11-25 NOTE — ASSESSMENT & PLAN NOTE
MELD 3.0: 33 at 11/25/2024  2:45 AM  MELD-Na: 33 at 11/25/2024  2:45 AM  Calculated from:  Serum Creatinine: 3.9 mg/dL (Using max of 3 mg/dL) at 11/25/2024  2:45 AM  Serum Sodium: 129 mmol/L at 11/25/2024  2:45 AM  Total Bilirubin: 4.1 mg/dL at 11/25/2024  2:45 AM  Serum Albumin: 4 g/dL (Using max of 3.5 g/dL) at 11/25/2024  2:45 AM  INR(ratio): 1.7 at 11/23/2024  3:58 AM  Age at listing (hypothetical): 69 years  Sex: Male at 11/25/2024  2:45 AM    --Hepatology following, akash brody

## 2024-11-25 NOTE — PROGRESS NOTES
Pharmacist Renal Dose Adjustment Note    Juan Carlos Yoo Sr. is a 69 y.o. male being treated with the medication Zosyn    Patient Data:    Vital Signs (Most Recent):  Temp: 97.9 °F (36.6 °C) (11/25/24 0705)  Pulse: (!) 123 (11/25/24 1000)  Resp: 20 (11/25/24 1000)  BP: (!) 115/48 (11/25/24 0705)  SpO2: 100 % (11/25/24 1000) Vital Signs (72h Range):  Temp:  [97.4 °F (36.3 °C)-99.9 °F (37.7 °C)]   Pulse:  []   Resp:  [10-38]   BP: ()/(48-72)   SpO2:  [84 %-100 %]   Arterial Line BP: ()/(48-64)      Recent Labs   Lab 11/24/24  1742 11/24/24  2241 11/25/24  0245   CREATININE 6.3* 4.8* 3.9*     Serum creatinine: 3.9 mg/dL (H) 11/25/24 0245  Estimated creatinine clearance: 26.3 mL/min (A)    Medication:Zosyn 4.5 g Q12H will be changed to medication:Zosyn dose:4.5g Q8H    Pharmacist's Name: Nina Luo  Pharmacist's Extension: [phone]

## 2024-11-25 NOTE — PROGRESS NOTES
11/25/24 0323   Treatment   Treatment Type SLED   Treatment Status Discontinued treatment   Dialysis Machine Number [id number]   Dialyzer Time (hours) 6.32   BVP (Liters) 58.6 L   Solutions Labeled and Current  Yes   Access Temporary Cath   Catheter Dressing Intact  Yes   Alarms Engaged Yes   CRRT Comments sled tx completed   CRRT Hourly Documentation   Blood Flow (mL/min) 150   UF Rate 400 cc/hr   Arterial Pressure (mmHg) 10 mmHg   Venous Pressure (mmHg) 90 mmHg   Effluent Pressure (EP) (mmHg) 20 mmHg   Total UF (Hourly Cleared) (mL) 193     Sled tx completed, reinfused with NS, saline locked, sressing clean dry intact, report given to primary rn

## 2024-11-25 NOTE — PROGRESS NOTES
11/24/24 2020   Treatment   Treatment Type SLED   Dialysis Machine Number blood returned   Dialyzer Time (hours) 4   BVP (Liters) 35.5 L   Solutions Labeled and Current  Yes   Access Temporary Cath   Catheter Dressing Intact  Yes   Alarms Engaged Yes   CRRT Comments blood returned by primary rn     Sled restarted per md order, accessed via right ij, flushed well, lines secured

## 2024-11-26 LAB
ALBUMIN SERPL BCP-MCNC: 3.3 G/DL (ref 3.5–5.2)
ALBUMIN SERPL BCP-MCNC: 3.3 G/DL (ref 3.5–5.2)
ALBUMIN SERPL BCP-MCNC: 3.6 G/DL (ref 3.5–5.2)
ALBUMIN SERPL BCP-MCNC: 3.7 G/DL (ref 3.5–5.2)
ALBUMIN SERPL BCP-MCNC: 3.7 G/DL (ref 3.5–5.2)
ALP SERPL-CCNC: 49 U/L (ref 40–150)
ALT SERPL W/O P-5'-P-CCNC: 10 U/L (ref 10–44)
ANION GAP SERPL CALC-SCNC: 12 MMOL/L (ref 8–16)
ANION GAP SERPL CALC-SCNC: 12 MMOL/L (ref 8–16)
ANION GAP SERPL CALC-SCNC: 14 MMOL/L (ref 8–16)
ANION GAP SERPL CALC-SCNC: 14 MMOL/L (ref 8–16)
ANION GAP SERPL CALC-SCNC: 15 MMOL/L (ref 8–16)
AST SERPL-CCNC: 14 U/L (ref 10–40)
BASOPHILS # BLD AUTO: 0 K/UL (ref 0–0.2)
BASOPHILS # BLD AUTO: 0 K/UL (ref 0–0.2)
BASOPHILS NFR BLD: 0 % (ref 0–1.9)
BASOPHILS NFR BLD: 0 % (ref 0–1.9)
BILIRUB SERPL-MCNC: 3.1 MG/DL (ref 0.1–1)
BUN SERPL-MCNC: 41 MG/DL (ref 8–23)
BUN SERPL-MCNC: 42 MG/DL (ref 8–23)
BURR CELLS BLD QL SMEAR: ABNORMAL
CALCIUM SERPL-MCNC: 9.3 MG/DL (ref 8.7–10.5)
CALCIUM SERPL-MCNC: 9.4 MG/DL (ref 8.7–10.5)
CALCIUM SERPL-MCNC: 9.5 MG/DL (ref 8.7–10.5)
CHLORIDE SERPL-SCNC: 100 MMOL/L (ref 95–110)
CHLORIDE SERPL-SCNC: 99 MMOL/L (ref 95–110)
CO2 SERPL-SCNC: 14 MMOL/L (ref 23–29)
CO2 SERPL-SCNC: 15 MMOL/L (ref 23–29)
CO2 SERPL-SCNC: 17 MMOL/L (ref 23–29)
CO2 SERPL-SCNC: 19 MMOL/L (ref 23–29)
CO2 SERPL-SCNC: 19 MMOL/L (ref 23–29)
CREAT SERPL-MCNC: 5.2 MG/DL (ref 0.5–1.4)
CREAT SERPL-MCNC: 5.5 MG/DL (ref 0.5–1.4)
CREAT SERPL-MCNC: 5.7 MG/DL (ref 0.5–1.4)
CREAT SERPL-MCNC: 5.8 MG/DL (ref 0.5–1.4)
CREAT SERPL-MCNC: 5.8 MG/DL (ref 0.5–1.4)
DIFFERENTIAL METHOD BLD: ABNORMAL
DIFFERENTIAL METHOD BLD: ABNORMAL
EOSINOPHIL # BLD AUTO: 0 K/UL (ref 0–0.5)
EOSINOPHIL # BLD AUTO: 0 K/UL (ref 0–0.5)
EOSINOPHIL NFR BLD: 0 % (ref 0–8)
EOSINOPHIL NFR BLD: 0 % (ref 0–8)
ERYTHROCYTE [DISTWIDTH] IN BLOOD BY AUTOMATED COUNT: 22.9 % (ref 11.5–14.5)
ERYTHROCYTE [DISTWIDTH] IN BLOOD BY AUTOMATED COUNT: 23.3 % (ref 11.5–14.5)
EST. GFR  (NO RACE VARIABLE): 10 ML/MIN/1.73 M^2
EST. GFR  (NO RACE VARIABLE): 10.4 ML/MIN/1.73 M^2
EST. GFR  (NO RACE VARIABLE): 11.1 ML/MIN/1.73 M^2
EST. GFR  (NO RACE VARIABLE): 9.8 ML/MIN/1.73 M^2
EST. GFR  (NO RACE VARIABLE): 9.8 ML/MIN/1.73 M^2
GLUCOSE SERPL-MCNC: 142 MG/DL (ref 70–110)
GLUCOSE SERPL-MCNC: 142 MG/DL (ref 70–110)
GLUCOSE SERPL-MCNC: 157 MG/DL (ref 70–110)
GLUCOSE SERPL-MCNC: 162 MG/DL (ref 70–110)
GLUCOSE SERPL-MCNC: 172 MG/DL (ref 70–110)
HCT VFR BLD AUTO: 23.3 % (ref 40–54)
HCT VFR BLD AUTO: 24.1 % (ref 40–54)
HGB BLD-MCNC: 7.5 G/DL (ref 14–18)
HGB BLD-MCNC: 7.8 G/DL (ref 14–18)
IMM GRANULOCYTES # BLD AUTO: 0.03 K/UL (ref 0–0.04)
IMM GRANULOCYTES # BLD AUTO: 0.03 K/UL (ref 0–0.04)
IMM GRANULOCYTES NFR BLD AUTO: 0.6 % (ref 0–0.5)
IMM GRANULOCYTES NFR BLD AUTO: 0.7 % (ref 0–0.5)
INR PPP: 2 (ref 0.8–1.2)
LYMPHOCYTES # BLD AUTO: 0.2 K/UL (ref 1–4.8)
LYMPHOCYTES # BLD AUTO: 0.2 K/UL (ref 1–4.8)
LYMPHOCYTES NFR BLD: 3.7 % (ref 18–48)
LYMPHOCYTES NFR BLD: 4.6 % (ref 18–48)
MAGNESIUM SERPL-MCNC: 2.2 MG/DL (ref 1.6–2.6)
MAGNESIUM SERPL-MCNC: 2.3 MG/DL (ref 1.6–2.6)
MCH RBC QN AUTO: 24.8 PG (ref 27–31)
MCH RBC QN AUTO: 24.9 PG (ref 27–31)
MCHC RBC AUTO-ENTMCNC: 32.2 G/DL (ref 32–36)
MCHC RBC AUTO-ENTMCNC: 32.4 G/DL (ref 32–36)
MCV RBC AUTO: 77 FL (ref 82–98)
MCV RBC AUTO: 77 FL (ref 82–98)
MONOCYTES # BLD AUTO: 0.7 K/UL (ref 0.3–1)
MONOCYTES # BLD AUTO: 0.9 K/UL (ref 0.3–1)
MONOCYTES NFR BLD: 16.1 % (ref 4–15)
MONOCYTES NFR BLD: 17.5 % (ref 4–15)
NEUTROPHILS # BLD AUTO: 3.6 K/UL (ref 1.8–7.7)
NEUTROPHILS # BLD AUTO: 3.8 K/UL (ref 1.8–7.7)
NEUTROPHILS NFR BLD: 78.2 % (ref 38–73)
NEUTROPHILS NFR BLD: 78.6 % (ref 38–73)
NRBC BLD-RTO: 0 /100 WBC
NRBC BLD-RTO: 0 /100 WBC
PHOSPHATE SERPL-MCNC: 5.2 MG/DL (ref 2.7–4.5)
PHOSPHATE SERPL-MCNC: 5.4 MG/DL (ref 2.7–4.5)
PHOSPHATE SERPL-MCNC: 5.5 MG/DL (ref 2.7–4.5)
PHOSPHATE SERPL-MCNC: 5.6 MG/DL (ref 2.7–4.5)
PHOSPHATE SERPL-MCNC: 5.6 MG/DL (ref 2.7–4.5)
PLATELET # BLD AUTO: 38 K/UL (ref 150–450)
PLATELET # BLD AUTO: 48 K/UL (ref 150–450)
PLATELET BLD QL SMEAR: ABNORMAL
PMV BLD AUTO: 10.4 FL (ref 9.2–12.9)
PMV BLD AUTO: 9.8 FL (ref 9.2–12.9)
POIKILOCYTOSIS BLD QL SMEAR: ABNORMAL
POTASSIUM SERPL-SCNC: 4 MMOL/L (ref 3.5–5.1)
POTASSIUM SERPL-SCNC: 4 MMOL/L (ref 3.5–5.1)
POTASSIUM SERPL-SCNC: 4.2 MMOL/L (ref 3.5–5.1)
PROT SERPL-MCNC: 7.4 G/DL (ref 6–8.4)
PROTHROMBIN TIME: 20.9 SEC (ref 9–12.5)
RBC # BLD AUTO: 3.03 M/UL (ref 4.6–6.2)
RBC # BLD AUTO: 3.13 M/UL (ref 4.6–6.2)
SODIUM SERPL-SCNC: 129 MMOL/L (ref 136–145)
SODIUM SERPL-SCNC: 129 MMOL/L (ref 136–145)
SODIUM SERPL-SCNC: 130 MMOL/L (ref 136–145)
SODIUM SERPL-SCNC: 131 MMOL/L (ref 136–145)
SODIUM SERPL-SCNC: 131 MMOL/L (ref 136–145)
WBC # BLD AUTO: 4.59 K/UL (ref 3.9–12.7)
WBC # BLD AUTO: 4.87 K/UL (ref 3.9–12.7)

## 2024-11-26 PROCEDURE — 25000003 PHARM REV CODE 250: Performed by: INTERNAL MEDICINE

## 2024-11-26 PROCEDURE — 25000003 PHARM REV CODE 250

## 2024-11-26 PROCEDURE — 90945 DIALYSIS ONE EVALUATION: CPT

## 2024-11-26 PROCEDURE — 83735 ASSAY OF MAGNESIUM: CPT | Mod: 91

## 2024-11-26 PROCEDURE — 99291 CRITICAL CARE FIRST HOUR: CPT | Mod: ,,, | Performed by: INTERNAL MEDICINE

## 2024-11-26 PROCEDURE — 85610 PROTHROMBIN TIME: CPT

## 2024-11-26 PROCEDURE — 80069 RENAL FUNCTION PANEL: CPT | Mod: 91 | Performed by: STUDENT IN AN ORGANIZED HEALTH CARE EDUCATION/TRAINING PROGRAM

## 2024-11-26 PROCEDURE — 27000207 HC ISOLATION

## 2024-11-26 PROCEDURE — 83735 ASSAY OF MAGNESIUM: CPT | Mod: 91 | Performed by: STUDENT IN AN ORGANIZED HEALTH CARE EDUCATION/TRAINING PROGRAM

## 2024-11-26 PROCEDURE — 63600175 PHARM REV CODE 636 W HCPCS

## 2024-11-26 PROCEDURE — 80053 COMPREHEN METABOLIC PANEL: CPT

## 2024-11-26 PROCEDURE — 25000003 PHARM REV CODE 250: Performed by: STUDENT IN AN ORGANIZED HEALTH CARE EDUCATION/TRAINING PROGRAM

## 2024-11-26 PROCEDURE — 83735 ASSAY OF MAGNESIUM: CPT

## 2024-11-26 PROCEDURE — 99291 CRITICAL CARE FIRST HOUR: CPT | Mod: GT,,, | Performed by: STUDENT IN AN ORGANIZED HEALTH CARE EDUCATION/TRAINING PROGRAM

## 2024-11-26 PROCEDURE — 84100 ASSAY OF PHOSPHORUS: CPT

## 2024-11-26 PROCEDURE — 80069 RENAL FUNCTION PANEL: CPT

## 2024-11-26 PROCEDURE — 63600175 PHARM REV CODE 636 W HCPCS: Performed by: INTERNAL MEDICINE

## 2024-11-26 PROCEDURE — 94761 N-INVAS EAR/PLS OXIMETRY MLT: CPT

## 2024-11-26 PROCEDURE — 86022 PLATELET ANTIBODIES: CPT

## 2024-11-26 PROCEDURE — 20000000 HC ICU ROOM

## 2024-11-26 PROCEDURE — 99233 SBSQ HOSP IP/OBS HIGH 50: CPT | Mod: ,,, | Performed by: INTERNAL MEDICINE

## 2024-11-26 PROCEDURE — 85025 COMPLETE CBC W/AUTO DIFF WBC: CPT | Mod: 91

## 2024-11-26 RX ORDER — LACTULOSE 10 G/15ML
45 SOLUTION ORAL 4 TIMES DAILY
Status: CANCELLED | OUTPATIENT
Start: 2024-11-26

## 2024-11-26 RX ORDER — LACTULOSE 10 G/15ML
45 SOLUTION ORAL EVERY 6 HOURS PRN
Status: DISCONTINUED | OUTPATIENT
Start: 2024-11-26 | End: 2024-11-27

## 2024-11-26 RX ORDER — HYDROCODONE BITARTRATE AND ACETAMINOPHEN 500; 5 MG/1; MG/1
TABLET ORAL CONTINUOUS
Status: ACTIVE | OUTPATIENT
Start: 2024-11-26 | End: 2024-11-27

## 2024-11-26 RX ORDER — MAGNESIUM SULFATE HEPTAHYDRATE 40 MG/ML
2 INJECTION, SOLUTION INTRAVENOUS
Status: ACTIVE | OUTPATIENT
Start: 2024-11-26 | End: 2024-11-27

## 2024-11-26 RX ADMIN — Medication 250 MCG: at 08:11

## 2024-11-26 RX ADMIN — HYDROCORTISONE SODIUM SUCCINATE 100 MG: 100 INJECTION, POWDER, FOR SOLUTION INTRAMUSCULAR; INTRAVENOUS at 05:11

## 2024-11-26 RX ADMIN — PIPERACILLIN SODIUM AND TAZOBACTAM SODIUM 4.5 G: 4; .5 INJECTION, POWDER, FOR SOLUTION INTRAVENOUS at 03:11

## 2024-11-26 RX ADMIN — LACTULOSE 30 G: 20 SOLUTION ORAL at 08:11

## 2024-11-26 RX ADMIN — FLUDROCORTISONE ACETATE 100 MCG: 0.1 TABLET ORAL at 08:11

## 2024-11-26 RX ADMIN — HYDROCORTISONE SODIUM SUCCINATE 100 MG: 100 INJECTION, POWDER, FOR SOLUTION INTRAMUSCULAR; INTRAVENOUS at 01:11

## 2024-11-26 RX ADMIN — MUPIROCIN: 20 OINTMENT TOPICAL at 08:11

## 2024-11-26 RX ADMIN — HEPARIN SODIUM 7500 UNITS: 5000 INJECTION INTRAVENOUS; SUBCUTANEOUS at 05:11

## 2024-11-26 RX ADMIN — SODIUM CHLORIDE: 9 INJECTION, SOLUTION INTRAVENOUS at 05:11

## 2024-11-26 RX ADMIN — MIDODRINE HYDROCHLORIDE 15 MG: 5 TABLET ORAL at 08:11

## 2024-11-26 RX ADMIN — MIDODRINE HYDROCHLORIDE 15 MG: 5 TABLET ORAL at 02:11

## 2024-11-26 RX ADMIN — HYDROCORTISONE SODIUM SUCCINATE 100 MG: 100 INJECTION, POWDER, FOR SOLUTION INTRAMUSCULAR; INTRAVENOUS at 08:11

## 2024-11-26 RX ADMIN — PANTOPRAZOLE SODIUM 40 MG: 40 TABLET, DELAYED RELEASE ORAL at 08:11

## 2024-11-26 RX ADMIN — PIPERACILLIN SODIUM AND TAZOBACTAM SODIUM 4.5 G: 4; .5 INJECTION, POWDER, FOR SOLUTION INTRAVENOUS at 02:11

## 2024-11-26 RX ADMIN — TAMSULOSIN HYDROCHLORIDE 0.4 MG: 0.4 CAPSULE ORAL at 08:11

## 2024-11-26 RX ADMIN — OXYCODONE HYDROCHLORIDE AND ACETAMINOPHEN 500 MG: 500 TABLET ORAL at 08:11

## 2024-11-26 NOTE — ASSESSMENT & PLAN NOTE
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 Blood cultures from admission with B. Diminuta, micrococcus luteus, lysinobacillus species. Seen by ID previously who recommended stopping antibiotics due to concern these were contaminants. Repeat blood cultures have been no growth. Has since been transferred to ICU with increased pressor requirement, now off vaso. Blood cultures repeated on 11/24 are no growth x 24 hours. Repeat abdominal ultrasound with small ascites only.     Recommendations  - can continue vancomycin goal trough 15-20 mcg/ml until 11/24 blood cultures no growth x 48 hours  - continue pip-tazo 4.5 q8 hours for B. Diminuta x 14 days, end 12/8/24

## 2024-11-26 NOTE — PROGRESS NOTES
[facility] - Medical ICU  Infectious Disease  Progress Note    Patient Name: Juan Carlos Yoo Sr.  MRN: [id number]  Admission Date: 11/20/2024  Length of Stay: 6 days  Attending Physician: Saad Rahamn MD  Primary Care Provider: Nyla Rios FNP    Isolation Status: Special Contact  Assessment/Plan:      ID  Gram-negative bacteremia  Juan Carlos Yoo is a 69 year old man with decompensated cirrhosis, alcohol use disorder (in remission), HCC s/p radioembolization, Afib on apixiban who was admitted 11/20 for JAE concerning for hepato-renal syndrome. Blood cultures from admission with B. Diminuta, micrococcus luteus, lysinobacillus species. Seen by ID previously who recommended stopping antibiotics due to concern these were contaminants. Repeat blood cultures have been no growth. Has since been transferred to ICU with increased pressor requirement, now off vaso. Blood cultures repeated on 11/24 are no growth x 24 hours. Repeat abdominal ultrasound with small ascites only.     Recommendations  - can continue vancomycin goal trough 15-20 mcg/ml until 11/24 blood cultures no growth x 48 hours  - continue pip-tazo 4.5 q8 hours for B. Diminuta x 14 days, end 12/8/24    Above discussed with primary team.     Critical care time: 40 minutes  I personally spent critical care time on the following: evaluating this patient's organ dysfunction, development of treatment plan, discussing treatment plan with patient or surrogate and bedside caregivers, discussions with critical care service and/or consultants, evaluation of patient's response to treatment, physical examination of patient, ordering and review of treatments interventions, laboratory studies, and radiographic studies, re-evaluation of patient's condition. This critical care time did not overlap with that of any other provider of the same specialty or involve time for procedures. This patient has decreasing pressor requirements, worsening renal dysfunction requiring renal  replacement therapy related to their infection, has worsening hepatic end organ damage related to their infection.They continue to be critically ill.       Anticipated Disposition: TBD    Thank you for your consult. I will sign off. Please contact us if you have any additional questions.    Johana Mendes MD  Infectious Disease  [facility] - Medical ICU    Subjective:     Principal Problem:Decompensated cirrhosis    HPI: 69M with decompensated liver cirrhosis 2/2 ETOH, HCC s/p y90, Afib on eliquis - admitted 11/20/24 for diffuse ansarca and JAE, c/f hepapto-renal syndrome. ID consulted for discordant positive bld cxs.     For background, He was recently hospitalized 1 month ago at [facility] for volume overload in the setting of decompensated cirrhosis. He was treated with IV diuresis and discharged with adjustment to his diuretics, currently on lasix 60mg BID and metolazone 2.5mg every other week as per family. Patient reports diffuse swelling of his upper and lower extremities in addition to distended abdomen and worsening dyspnea, which he believes is due to recent medication adjustment. Family states he is non-compliant with low sodium diet and fluid restriction. Family states he has been compliant with diuretics, but hasn't taken eliquis for 3 days due to issue getting refill. Pt claims to be compliant with medications, but is a poor historian. He follows with hepatology, not currently active on transplant list. He states he hasn't consumed alcohol for at least 9 months. Has c/o chills, but denies fever, cough, nausea, vomiting, chest pain, dysuria, hematuria, blood in stool.  Pt c/o worsening diffuse swelling Rt arm with pain and erythema after scratching on door fram few days ago. RUE U/S neg for DVT. Pt AF, VSS, HDS, wbc 15 uptrend. Index bld cxs 11/20 with discordant gram stains, 1 set with GPRs, the other with GPCs resemebling staph. MS/MRSA PCR negative, BCID negative. Suspect positive bld cxs  represent contaaminant. Repeat bld cxs sent 11/22.     Ucx +coag-neg staph; though without urinary sxs. RP-U/S neg for  anomaly, no stones or hydronephrosis. Abd U/S did not reveal significant  fluid for paracentesis; only trace ascits and small left pleural effusion as noted on chest-XR, with Rt basilar opacity. Pt with infrequent and non-productive cough.     Pt on empiric IV-vanc and ceftriaxone.           Interval History: denies any new symptoms but is bothered by persistent arm swelling.     Review of Systems   Gastrointestinal:  Negative for abdominal pain.   Musculoskeletal: Negative.    Skin:  Positive for color change and wound.     Objective:     Vital Signs (Most Recent):  Temp: 97.9 °F (36.6 °C) (11/26/24 1115)  Pulse: (!) 123 (11/26/24 1330)  Resp: 13 (11/26/24 1330)  BP: (!) 138/56 (11/25/24 1505)  SpO2: 99 % (11/26/24 1330) Vital Signs (24h Range):  Temp:  [97.6 °F (36.4 °C)-98.7 °F (37.1 °C)] 97.9 °F (36.6 °C)  Pulse:  [108-143] 123  Resp:  [10-27] 13  SpO2:  [97 %-100 %] 99 %  Arterial Line BP: (115-137)/(49-58) 132/53     Weight: (!) 148 kg (326 lb 4.5 oz)  Body mass index is 43.05 kg/m².    Estimated Creatinine Clearance: 17.7 mL/min (A) (based on SCr of 5.8 mg/dL (H)).     Physical Exam  Vitals and nursing note reviewed.   Constitutional:       Appearance: He is ill-appearing.   HENT:      Head: Normocephalic.      Mouth/Throat:      Mouth: Mucous membranes are moist.   Pulmonary:      Effort: Pulmonary effort is normal. No respiratory distress.   Abdominal:      General: There is distension.      Palpations: Abdomen is soft.      Tenderness: There is no abdominal tenderness. There is no guarding.   Musculoskeletal:         General: Swelling present.   Skin:     General: Skin is warm and dry.      Findings: Erythema present.   Neurological:      Mental Status: He is alert.   Psychiatric:         Mood and Affect: Mood normal.          Significant Labs:   Microbiology Results (last 7 days)        Procedure Component Value Units Date/Time    Blood culture x two cultures. Draw prior to antibiotics. [[id number]]  (Abnormal)  (Susceptibility) Collected: 11/20/24 1429    Order Status: Completed Specimen: Blood from Peripheral, Hand, Right Updated: 11/26/24 1056     Blood Culture, Routine Gram stain aer bottle: Gram positive cocci in clusters resembling Staph      Results called to and read back by:Crystal Navarrete RN 11/21/2024  23:02      Gram stain malik bottle: Gram positive rods      Results called to and read back by: Melissa Hernandez RN  11/25/2024      01:35      BREVUNDIMONAS SPECIES  further identified as Brevundimonas diminuta group        MICROCOCCUS LUTEUS  Organism is a probable contaminant      Narrative:      Aerobic and anaerobic    Blood culture [[id number]] Collected: 11/22/24 0903    Order Status: Completed Specimen: Blood Updated: 11/26/24 1012     Blood Culture, Routine No Growth to date      No Growth to date      No Growth to date      No Growth to date      No Growth to date    Blood culture [[id number]] Collected: 11/22/24 0904    Order Status: Completed Specimen: Blood Updated: 11/26/24 1012     Blood Culture, Routine No Growth to date      No Growth to date      No Growth to date      No Growth to date      No Growth to date    Blood culture [[id number]] Collected: 11/24/24 1804    Order Status: Completed Specimen: Blood from Peripheral, Antecubital, Left Updated: 11/25/24 2012     Blood Culture, Routine No Growth to date      No Growth to date    Blood culture [[id number]] Collected: 11/24/24 1804    Order Status: Completed Specimen: Blood from Peripheral, Antecubital, Right Updated: 11/25/24 2012     Blood Culture, Routine No Growth to date      No Growth to date    Rapid Organism ID by PCR (from Blood culture) [[id number]] Collected: 11/20/24 1429    Order Status: Completed Updated: 11/25/24 0247     Enterococcus faecalis Not Detected     Enterococcus faecium Not Detected     Listeria  monocytogenes Not Detected     Staphylococcus spp. Not Detected     Staphylococcus aureus Not Detected     Staphylococcus epidermidis Not Detected     Staphylococcus lugdunensis Not Detected     Streptococcus species Not Detected     Streptococcus agalactiae Not Detected     Streptococcus pneumoniae Not Detected     Streptococcus pyogenes Not Detected     Acinetobacter calcoaceticus/baumannii complex Not Detected     Bacteroides fragilis Not Detected     Enterobacterales Not Detected     Enterobacter cloacae complex Not Detected     Escherichia coli Not Detected     Klebsiella aerogenes Not Detected     Klebsiella oxytoca Not Detected     Klebsiella pneumoniae group Not Detected     Proteus Not Detected     Salmonella sp Not Detected     Serratia marcescens Not Detected     Haemophilus influenzae Not Detected     Neisseria meningtidis Not Detected     Pseudomonas aeruginosa Not Detected     Stenotrophomonas maltophilia Not Detected     Candida albicans Not Detected     Candida auris Not Detected     Candida glabrata Not Detected     Candida krusei Not Detected     Candida parapsilosis Not Detected     Candida tropicalis Not Detected     Cryptococcus neoformans/gattii Not Detected     CTX-M (ESBL ) Test Not Applicable     IMP (Carbapenem resistant) Test Not Applicable     KPC resistance gene (Carbapenem resistant) Test Not Applicable     mcr-1  Test Not Applicable     mec A/C  Test Not Applicable     mec A/C and MREJ (MRSA) gene Test Not Applicable     NDM (Carbapenem resistant) Test Not Applicable     OXA-48-like (Carbapenem resistant) Test Not Applicable     van A/B (VRE gene) Test Not Applicable     VIM (Carbapenem resistant) Test Not Applicable    Narrative:      Aerobic and anaerobic    Blood culture x two cultures. Draw prior to antibiotics. [[id number]]  (Abnormal) Collected: 11/20/24 1444    Order Status: Completed Specimen: Blood from Peripheral, Wrist, Left Updated: 11/23/24 1000     Blood Culture,  Routine Gram stain aer bottle: Gram positive rods      Results called to and read back by:Crystal Navarrete RN 11/21/2024  21:58      GRAM-POSITIVE JANE  further identified as Lysinobacillus species      Narrative:      Aerobic and anaerobic    Urine culture [[id number]]  (Abnormal) Collected: 11/20/24 1801    Order Status: Completed Specimen: Urine Updated: 11/22/24 0301     Urine Culture, Routine COAGULASE-NEGATIVE STAPHYLOCOCCUS SPECIES  10,000 - 49,999 cfu/ml  Susceptibility testing not routinely performed.      Narrative:      Specimen Source->Urine    MRSA/SA Rapid ID by PCR from Blood culture [[id number]] Collected: 11/20/24 1429    Order Status: Completed Updated: 11/22/24 0010     Staph aureus ID by PCR Negative     Methicillin Resistant ID by PCR Negative    Narrative:      Aerobic and anaerobic    Rapid Organism ID by PCR (from Blood culture) [[id number]] Collected: 11/20/24 1444    Order Status: Completed Updated: 11/21/24 2202     Enterococcus faecalis Not Detected     Enterococcus faecium Not Detected     Listeria monocytogenes Not Detected     Staphylococcus spp. Not Detected     Staphylococcus aureus Not Detected     Staphylococcus epidermidis Not Detected     Staphylococcus lugdunensis Not Detected     Streptococcus species Not Detected     Streptococcus agalactiae Not Detected     Streptococcus pneumoniae Not Detected     Streptococcus pyogenes Not Detected     Acinetobacter calcoaceticus/baumannii complex Not Detected     Bacteroides fragilis Not Detected     Enterobacterales Not Detected     Enterobacter cloacae complex Not Detected     Escherichia coli Not Detected     Klebsiella aerogenes Not Detected     Klebsiella oxytoca Not Detected     Klebsiella pneumoniae group Not Detected     Proteus Not Detected     Salmonella sp Not Detected     Serratia marcescens Not Detected     Haemophilus influenzae Not Detected     Neisseria meningtidis Not Detected     Pseudomonas aeruginosa Not Detected      Stenotrophomonas maltophilia Not Detected     Candida albicans Not Detected     Candida auris Not Detected     Candida glabrata Not Detected     Candida krusei Not Detected     Candida parapsilosis Not Detected     Candida tropicalis Not Detected     Cryptococcus neoformans/gattii Not Detected     CTX-M (ESBL ) Test Not Applicable     IMP (Carbapenem resistant) Test Not Applicable     KPC resistance gene (Carbapenem resistant) Test Not Applicable     mcr-1  Test Not Applicable     mec A/C  Test Not Applicable     mec A/C and MREJ (MRSA) gene Test Not Applicable     NDM (Carbapenem resistant) Test Not Applicable     OXA-48-like (Carbapenem resistant) Test Not Applicable     van A/B (VRE gene) Test Not Applicable     VIM (Carbapenem resistant) Test Not Applicable    Narrative:      Aerobic and anaerobic    (rule out SBP) Gram stain [[id number]]     Order Status: No result Specimen: Ascites     (rule out SBP) Aerobic culture [[id number]]     Order Status: No result Specimen: Ascites     (rule out SBP) Culture, Anaerobic [[id number]]     Order Status: No result Specimen: Ascites     Clostridium difficile EIA [[id number]]     Order Status: Canceled Specimen: Stool     Stool culture [[id number]]     Order Status: No result Specimen: Stool             Significant Imaging: I have reviewed all pertinent imaging results/findings within the past 24 hours.

## 2024-11-26 NOTE — PROGRESS NOTES
[facility] - Medical ICU  Nephrology  Progress Note    Patient Name: Juan Carlos Yoo Sr.  MRN: [id number]  Admission Date: 11/20/2024  Hospital Length of Stay: 6 days  Attending Provider: Saad Rahman MD   Primary Care Physician: Nyla Rios FNP  Principal Problem:Decompensated cirrhosis    Subjective:     HPI: Juan Carlos Yoo is a 69 year old male with hx of decompensated hepatic cirrhosis due to alcohol use, HCC s/p Y90, esophageal varices, persistent afib on eliquis, HTN, CKD3a (baseline creatinine 1.2-1.4) admitted on 11/20 for sepsis likely 2/2 SSTI involving RUE and decompensated hepatic cirrhosis with signs of volume overload. Recent admission 10/4 at [facility] for decompensated hepatic cirrhosis where he was discharged with oral diuretic regimen including furosemide 60 mg BID and metolazone 2.5 mg daily, low salt diet with fluid restriction. It is unclear if patient is adherent to these medications or lifestyle modifications. W/u notable for anemia with hb 6.7 s/p 1 unit pRBC 11/20 and JAE on CKD w elevated BUN 49/creatinine 5.9, hyperkalemia (K 6.1>5.1 after shifting), bicarb 18, elevated lactate up to 3.5. Nephrology consulted for JAE with c/o HRS    Interval History: NAEON. No urinary output charted. Continued on levophed and vasopressin for MAP goal 80-85. BUN 42/creatinine 5.5.    Review of patient's allergies indicates:  No Known Allergies  Current Facility-Administered Medications   Medication Frequency    0.9%  NaCl infusion (CRRT USE ONLY) Continuous    albuterol-ipratropium 2.5 mg-0.5 mg/3 mL nebulizer solution 3 mL Q6H PRN    aluminum-magnesium hydroxide-simethicone 200-200-20 mg/5 mL suspension 30 mL QID PRN    ascorbic acid (vitamin C) tablet 500 mg Daily    cyanocobalamin tablet 250 mcg Daily    dextrose 10% bolus 125 mL 125 mL PRN    dextrose 10% bolus 250 mL 250 mL PRN    fludrocortisone tablet 100 mcg Daily    glucagon (human recombinant) injection 1 mg PRN    glucose chewable tablet 16 g PRN     glucose chewable tablet 24 g PRN    heparin (porcine) injection 7,500 Units Q8H    hydrocortisone sodium succinate injection 100 mg Q8H    lactulose 20 gram/30 mL solution Soln 45 g Q6H PRN    magnesium sulfate 2g in water 50mL IVPB (premix) PRN    melatonin tablet 6 mg Nightly PRN    midodrine tablet 15 mg TID    naloxone 0.4 mg/mL injection 0.02 mg PRN    NORepinephrine bitartrate-NaCl 32 mg/250 mL (128 mcg/mL) infusion Continuous    ondansetron injection 4 mg Q8H PRN    pantoprazole EC tablet 40 mg Daily    piperacillin-tazobactam (ZOSYN) 4.5 g in D5W 100 mL IVPB (MB+) Q12H    simethicone chewable tablet 80 mg QID PRN    sodium chloride 0.9% flush 10 mL Q12H PRN    sodium chloride 0.9% flush 10 mL PRN    sodium phosphate 20.01 mmol in D5W 250 mL IVPB PRN    sodium phosphate 30 mmol in D5W 250 mL IVPB PRN    sodium phosphate 39.99 mmol in D5W 250 mL IVPB PRN    tamsulosin 24 hr capsule 0.4 mg QHS    vancomycin - pharmacy to dose pharmacy to manage frequency    vasopressin (PITRESSIN) 0.2 Units/mL in 0.9% NaCl 100 mL infusion Continuous       Objective:     Vital Signs (Most Recent):  Temp: 97.6 °F (36.4 °C) (11/26/24 0745)  Pulse: (!) 120 (11/26/24 1015)  Resp: 14 (11/26/24 1015)  BP: (!) 138/56 (11/25/24 1505)  SpO2: 100 % (11/26/24 1015) Vital Signs (24h Range):  Temp:  [97.6 °F (36.4 °C)-98.7 °F (37.1 °C)] 97.6 °F (36.4 °C)  Pulse:  [108-143] 120  Resp:  [10-27] 14  SpO2:  [97 %-100 %] 100 %  BP: (138)/(56) 138/56  Arterial Line BP: (115-137)/(49-58) 123/57     Weight: (!) 148 kg (326 lb 4.5 oz) (11/22/24 0400)  Body mass index is 43.05 kg/m².  Body surface area is 2.76 meters squared.    I/O last 3 completed shifts:  In: 4413.9 [P.O.:650; I.V.:3305.1; IV Piggyback:458.8]  Out: 3343 [Other:3343]     Physical Exam  Vitals and nursing note reviewed.   Constitutional:       General: He is awake. He is not in acute distress.     Appearance: He is obese. He is ill-appearing. He is not toxic-appearing.   HENT:       Head: Normocephalic and atraumatic.   Eyes:      General: No scleral icterus.     Extraocular Movements: Extraocular movements intact.      Conjunctiva/sclera: Conjunctivae normal.   Cardiovascular:      Rate and Rhythm: Tachycardia present. Rhythm irregular.   Pulmonary:      Effort: Pulmonary effort is normal. No respiratory distress.   Abdominal:      General: There is distension.      Palpations: Abdomen is soft.      Tenderness: There is no abdominal tenderness. There is no guarding or rebound.   Musculoskeletal:         General: No swelling or tenderness.      Right lower leg: Edema present.      Left lower leg: Edema present.      Comments: 2+ pitting edema in bilateral lower extremities   Skin:     General: Skin is warm.      Coloration: Skin is not jaundiced.   Neurological:      Mental Status: He is alert and oriented to person, place, and time.      Motor: No weakness.   Psychiatric:         Behavior: Behavior is cooperative.        Significant Labs:  CBC:   Recent Labs   Lab 11/26/24  1035   WBC 4.59   RBC 3.03*   HGB 7.5*   HCT 23.3*   PLT 38*   MCV 77*   MCH 24.8*   MCHC 32.2     CMP:   Recent Labs   Lab 11/26/24  0335   *  157*   CALCIUM 9.5  9.3   ALBUMIN 3.7  3.6   PROT 7.4   *  129*   K 4.2  4.2   CO2 14*  15*     100   BUN 42*  42*   CREATININE 5.5*  5.7*   ALKPHOS 49   ALT 10   AST 14   BILITOT 3.1*     All labs within the past 24 hours have been reviewed.     Significant Imaging:  All imaging within the past 24 hours have been reviewed.   Assessment/Plan:     Renal/  Stage 3a chronic kidney disease  Creatinine stable for now. BMP reviewed- noted Estimated Creatinine Clearance: 18 mL/min (A) (based on SCr of 5.7 mg/dL (H)). according to latest data. Based on current GFR, CKD stage is stage 3 - GFR 30-59.  Monitor UOP and serial BMP and adjust therapy as needed. Renally dose meds. Avoid nephrotoxic medications and procedures. See JAE (acute kidney injury).      JAE  (acute kidney injury)  JAE is likely due to pre-renal azotemia due to intravascular volume depletion secondary to decompensated hepatic cirrhosis with volume overload and acute tubular necrosis caused by hemodynamic instability, renal hypoperfusion, possible sepsis. Concerns of hepatorenal syndrome. Hx of CKD3a with baseline creatinine is  1.2-1.4 . Most recent creatinine and eGFR are listed below.  Recent Labs     11/25/24  1356 11/25/24  2321 11/26/24  0335   CREATININE 4.9* 5.2* 5.5*  5.7*   EGFRNORACEVR 12.0* 11.1* 10.4*  10.0*       Maintain MAP goals > 80-85 mmHg, continue levophed and vasopressin for blood pressure support  Plan for SLED x 10 hours with net UF of 1.5L for removal of uremic toxins and volume control,   Consent obtained & placed in patient chart  S/p RIJ trialysis line placement 11/24    - Avoid nephrotoxins and renally dose meds for GFR listed above  - Monitor urine output, serial BMP, and adjust therapy as needed  - Retroperitoneal ultrasound c/w bilateral CKD, showed questionable 6 mm nonobstructing stone in the left upper pole without evidence of hydronephrosis or solid renal mass.      Thank you for your consult. We will follow-up with patient. Please contact us if you have any additional questions.    Margarita Paredes MD  Nephrology  [facility] - Medical ICU

## 2024-11-26 NOTE — NURSING
MICU DAILY GOALS     Family/Goals of care/Code Status   Code Status: Full Code    24H Vital Sign Range  Temp:  [97.6 °F (36.4 °C)-98.7 °F (37.1 °C)]   Pulse:  [112-143]   Resp:  [12-28]   BP: (115-138)/(48-56)   SpO2:  [96 %-100 %]   Arterial Line BP: (115-141)/(49-64)      Shift Events (include procedures and significant events)   No acute events throughout shift    AWAKE RASS: Goal - RASS Goal: 0-->alert and calm  Actual - RASS (Pedraza Agitation-Sedation Scale): alert and calm    Restraint necessity: Not necessary   BREATHE SBT: Not intubated    Coordinate A & B, analgesics/sedatives Pain: managed   SAT: Not intubated   Delirium CAM-ICU: Overall CAM-ICU: Negative   Early(intubated/ Progressive (non-intubated) Mobility MOVE Screen (INTUBATED ONLY): Not intubated    Activity: Activity Management: Arm raise - L1   Feeding/Nutrition Diet order: Diet/Nutrition Received: regular, Specialty Diet/Nutrition Received: renal diet   Thrombus DVT prophylaxis: VTE Core Measure: Pharmacological prophylaxis initiated/maintained   HOB Elevation Head of Bed (HOB) Positioning: HOB at 30 degrees   Ulcer Prophylaxis GI: yes   Glucose control managed Glycemic Management: blood glucose monitored   Skin Skin assessment:     Sacrum intact/not altered? Yes  Heels intact/not altered? Yes  Surgical wound? No    CHECK ONE!   (no altered skin or altered skin) and sub boxes:  [] No Altered Skin Integrity Present    []Prevention Measures Documented    [] Altered Skin Integrity Present or Discovered   [] LDA present in EPIC, daily doc completed              [] LDA added if not in EPIC (describe wound).                    When describing wound, do not stage, use descriptive words only.    [] Wound Image Taken (required on admit,                   transfer/discharge and every Tuesday)    Wound Care Consulted? No   Bowel Function constipation    Indwelling Catheter Necessity [REMOVED]      Urethral Catheter 11/20/24 1802 Double-lumen-Reason for  Continuing Urinary Catheterization: Urinary retention    Trialysis (Dialysis) Catheter 11/24/24 0441 right internal jugular-Line Necessity Review: CRRT/HD     De-escalation Antibiotics Yes        VS and assessment per flow sheet, patient progressing towards goals as tolerated, plan of care reviewed with family, all concerns addressed, will continue to monitor.

## 2024-11-26 NOTE — PROGRESS NOTES
[facility] - Medical ICU  Critical Care Medicine  Progress Note    Patient Name: Juan Carlos Yoo Sr.  MRN: [id number]  Admission Date: 11/20/2024  Hospital Length of Stay: 6 days  Code Status: Full Code  Attending Provider: Saad Rahman MD  Primary Care Provider: Nyla Rios FNP   Principal Problem: Decompensated cirrhosis    Subjective:     HPI:  Juan Carlos Yoo is a 69 male with PMH of alcoholic cirrhosis, esophageal varices, Afib on eliquis, and HTN who presents for c/o worsening diffuse swelling as well as R arm pain/erythema. He was recently hospitalized 1 month ago at [facility] for volume overload in the setting of decompensated cirrhosis. He was treated with IV diuresis and discharged with adjustment to his diuretics, currently on lasix 60mg BID and metolazone 2.5mg every other week as per family. Patient reports diffuse swelling of his upper and lower extremities in addition to distended abdomen and worsening dyspnea, which he believes is due to recent medication adjustment. Family states he is non-compliant with low sodium diet and fluid restriction. Family states he has been compliant with diuretics, but rom't taken eliquis for 3 days due to issue getting refill. He also reports scratching right arm on door frame 3-4 days ago which has become red and painful after wrapping in in bandage. He claims to be compliant with medications, but is a poor historian. He follows with hepatology, not currently active on transplant list. He states he hasn't consumed alcohol for at least 9 months. Has c/o chills, but denies fever, cough, nausea, vomiting, chest pain, dysuria, hematuria, blood in stool. Workup in ED remarkable for VS: T 97.6 HR 94 RR 22 BP 95/56 O2 sat 97% on RA. Hb 6.7, MCV 75, Plt 81, PT/INR 22.6/2.2, Na 128, K 6.1, BUN/Cr 49/5.7, Alb 2.8, AST/ALT 35/9, Ammonia 104, , Trop neg, LA 2.9, CXR: Cardiac size is enlarged similar to prior.  No large volume of pleural fluid noted although there is mild  blunting of the right costophrenic angle which may relate to a small amount of pleural fluid.  Suspected right basilar opacity, to be correlated clinically for infection. RUE venous doppler: No thrombus in central veins of the right upper extremity. Patient received vanc/zosyn and lasix 80mg IVP in ED and will be admitted to hospital medicine service for further management.     Pt was admitted to hospital medicine. Blood cultures positive for Gram-positive cocci and Gram-negative rods. ID has been consulted. S/p 2 units PRBC for Hemoglobin dropped 6.8 on 11/21. Pt was on Eliquis for atrial fibrillation prior to admission which was held.  Hepatology following patient's clinical course, but are not evaluating him for transplant at this time. Diuretics were held because of concern for HRS.  Patient was started on albumin and midodrine per Nephrology recommendations for HRS.  Renal function continued to worsen and recommendation per Nephrology to transfer to ICU for maintaining goal map over 85 on Levophed.  ICU was notified and patient to be assessed to be transferred to ICU.       Hospital/ICU Course:  69 y.o. male with history of EtOH use disorder, EtOH cirrhosis, HCC s/p y90, morbid obesity BMI 44, HTN, and Afib who presents with severe anasarca and JAE.      Appreciate hepatology and nephrology recommendations.  Diuretics were held because of concern for HRS.  Patient was started on albumin and midodrine per Nephrology recommendations for HRS.  Renal function continued to worsen and recommendation per Nephrology to transfer to ICU for maintaining goal map over 85 on Levophed.  ICU was notified and patient to be assessed to be transferred to ICU.     Patient also has Gram-positive cocci and Gram-negative rods in his blood now.  Possible sources could be got translocation and barrier related infection through skin as he has been significantly edematous and has been oozing.  Has been on vancomycin and Rocephin.  Id  was consulted this morning.  Repeat blood cultures were obtained.     Continues to have anemia.  Hemoglobin dropped on 11/20 and was given 1 unit of packed red blood cells.  Did not respond appropriately.  Hemoglobin dropped again to 6.8 on 11/21 and was given 1 unit of packed red blood cells.  Hemoglobin again on 11/22 is 7.2.  No reported melena or hematochezia.  Patient was on Eliquis for atrial fibrillation prior to admission which was held.  Given history of esophageal varices and portal hypertensive gastropathy whereas also could be component of slow bleeding, under production due to CKD and hemolysis while also with decompensated cirrhosis.  Started on Protonix IV b.i.d. and octreotide.     Also clarified with hepatology.  Given patient's current infection we will await ID recommendations however we will be challenging to consider transplant evaluation at this time.  Trend clinical course     Goal clarification with the patient and family.  Patient at this time wants to be full code and continue with Levophed in ICU.  They would consider discussion with palliative care in a day or so if things do not get better.     In MICU patient started on Levophed, however titration remained difficult given tachycardic response from the patient.     11/24 Trialysis and arterial lines placed overnight and currently on levo and norepi. SLED today.    11/25 Boo dc. Increased midodrine. Continue steroids until d/c pressors. Discussed with Nephrology about our concern for sustained use of pressors to achieve their MAP goals of 85. Will follow up tomorrow.     11/26 Platelets 48. Repeat 38 confirming thrombocytopenia. Concern for HIT. D/c heparin. Increased lactulose dosing. Bladder scan revealed urine in bladder. Boo placed. Off vaso. Continuing levo. Maintain MAP goals 80. PT/OT consulted.     Interval History/Significant Events: no acute events overnight.     Review of Systemsno interval change   Objective:     Vital  Signs (Most Recent):  Temp: 97.9 °F (36.6 °C) (11/26/24 1115)  Pulse: (!) 123 (11/26/24 1330)  Resp: 13 (11/26/24 1330)  BP: (!) 138/56 (11/25/24 1505)  SpO2: 99 % (11/26/24 1330) Vital Signs (24h Range):  Temp:  [97.6 °F (36.4 °C)-98.7 °F (37.1 °C)] 97.9 °F (36.6 °C)  Pulse:  [108-143] 123  Resp:  [10-27] 13  SpO2:  [97 %-100 %] 99 %  Arterial Line BP: (115-137)/(49-58) 132/53   Weight: (!) 148 kg (326 lb 4.5 oz)  Body mass index is 43.05 kg/m².      Intake/Output Summary (Last 24 hours) at 11/26/2024 1603  Last data filed at 11/26/2024 1018  Gross per 24 hour   Intake 1283.07 ml   Output --   Net 1283.07 ml          Physical Exam     Physical Exam  Constitutional:       General: He is not in acute distress.     Appearance: He is obese. He is ill-appearing (chronic). He is not toxic-appearing.   HENT:      Head: Normocephalic and atraumatic.   Eyes:      General: Scleral icterus present.      Extraocular Movements: Extraocular movements intact.      Conjunctiva/sclera: Conjunctivae normal.   Cardiovascular:      Rate and Rhythm: Normal rate. Rhythm irregular.      Heart sounds: Normal heart sounds.   Pulmonary:      Effort: Pulmonary effort is normal. No respiratory distress.      Breath sounds: No wheezing.   Abdominal:      General: Bowel sounds are normal. There is distension.      Palpations: Abdomen is soft.      Tenderness: There is no abdominal tenderness.      Hernia: A hernia (umbilical and L inguinal hernia) is present.   Musculoskeletal:         General: Normal range of motion.      Right upper arm: Swelling and edema present.      Left upper arm: Swelling and edema present.      Cervical back: Normal range of motion and neck supple.      Right lower leg: Edema present.      Left lower leg: Edema present.   Skin:     General: Skin is warm and dry.      Coloration: Skin is not jaundiced.      Findings: Erythema (RUE) present.   Neurological:      General: No focal deficit present.      Mental Status: He  is alert and oriented to person, place, and time. He is confused.      Comments: Asterix[employer]   Psychiatric:         Attention and Perception: He is inattentive.         Behavior: Behavior normal.   Vents:     Lines/Drains/Airways       Central Venous Catheter Line  Duration             Trialysis (Dialysis) Catheter 11/24/24 0441 right internal jugular 2 days              Drain  Duration             Male External Urinary Catheter 11/25/24 Other (Comment) 1 day              Arterial Line  Duration             Arterial Line 11/24/24 0317 Right Radial 2 days              Peripheral Intravenous Line  Duration                  Peripheral IV - Single Lumen 11/21/24 2300 20 G Right Breast 4 days                  Significant Labs:    CBC/Anemia Profile:  Recent Labs   Lab 11/25/24  0245 11/26/24  0335 11/26/24  1035   WBC 6.85 4.87 4.59   HGB 8.0* 7.8* 7.5*   HCT 24.7* 24.1* 23.3*   PLT 74* 48* 38*   MCV 76* 77* 77*   RDW 22.4* 23.3* 22.9*        Chemistries:  Recent Labs   Lab 11/25/24  0245 11/25/24  1356 11/25/24  2321 11/26/24  0335 11/26/24  1347   *   < > 130* 129*  129* 131*  131*   K 4.5   < > 4.2 4.2  4.2 4.0  4.0   CL 98   < > 99 100  100 100  100   CO2 16*   < > 17* 14*  15* 19*  19*   BUN 28*   < > 41* 42*  42* 42*  42*   CREATININE 3.9*   < > 5.2* 5.5*  5.7* 5.8*  5.8*   CALCIUM 8.9   < > 9.4 9.5  9.3 9.3  9.3   ALBUMIN 4.0   < > 3.7 3.7  3.6 3.3*  3.3*   PROT 8.0  --   --  7.4  --    BILITOT 4.1*  --   --  3.1*  --    ALKPHOS 53  --   --  49  --    ALT 11  --   --  10  --    AST 18  --   --  14  --    MG 2.1   < > 2.3 2.2 2.3  2.3   PHOS 4.0   < > 5.2* 5.5*  5.4* 5.6*  5.6*    < > = values in this interval not displayed.       All pertinent labs within the past 24 hours have been reviewed.    Significant Imaging:  I have reviewed all pertinent imaging results/findings within the past 24 hours.    AB  Recent Labs   Lab 11/20/24  1542   PH 7.412   PO2 40   PCO2 34.7*   HCO3 22.1*   BE -2  "    Assessment/Plan:     Neuro  Encephalopathy, metabolic  AAOx4  Will assess for signs of encephalopathy     Cardiac/Vascular  Atrial fibrillation  Holding home Eliquis in the setting of recent low blood counts    Renal/  Stage 3a chronic kidney disease  --Nephrology following  --Strict intake and output  --Renally dose all medications  --Avoid nephrotoxic agents    JAE (acute kidney injury)  Baseline creatinine is 1.5    Cr trend 6.3 > 6.4     Plan  - JAE is worsening. Will continue current treatment  - Avoid nephrotoxins and renally dose meds for GFR listed above  - Monitor urine output, serial BMP, and adjust therapy as needed     - Etiology includes volume overload, obstruction, hypotension, possible HRS  - monitor peacock catheter and monitor strict I&O  - was started on midodrine and albumin.  Octreotide started 11/22/2024.  -appreciate nephrology recommendations and recommendation to give 1 dose of IV Lasix 120 mg.   - ICU consulted for Levo administration with MAP goal > 80 . Peripheral Levophed ordered, titration difficult due to tachycardic response, will continue attempting to meet MAP goal however this may be difficult given the patient has Afib.  - Stress dosing with Fludrocortisone and Hydrocortisone as HRS patients tend to have AI      ID  Severe sepsis  This patient does have evidence of infective focus  My overall impression is sepsis.  Source: Abdominal  Antibiotics given-   Antibiotics (72h ago, onward)      Start     Stop Route Frequency Ordered    11/26/24 2300  piperacillin-tazobactam (ZOSYN) 4.5 g in D5W 100 mL IVPB (MB+)         -- IV Every 8 hours (non-standard times) 11/26/24 1522    11/24/24 0710  vancomycin - pharmacy to dose  (vancomycin IVPB (PEDS and ADULTS))        Placed in "And" Linked Group    -- IV pharmacy to manage frequency 11/24/24 0610          Latest lactate reviewed-  Recent Labs   Lab 11/24/24  0633   LACTATE 3.1*       Organ dysfunction indicated by Acute kidney " injury    Fluid challenge Contraindicated- Fluid bolus is contraindicated in this patient due to End Stage Liver Disease     Post- resuscitation assessment No - Post resuscitation assessment not needed     Source control achieved by: antibiotics    Hematology  Thrombocytopenia  --Daily CBC  --Transfuse for PLT<10k, or PLT<50K with active bleeding  --Monitor for signs and symptoms of bleeding    Coagulopathy  End Stage Liver Disease precipitated coagulopathy  -heparin d/c d/t thrombocytopenia    Endocrine  Hyponatremia  Likely dilutional secondary to end stage liver disease and HRS    Will monitor for S&S of hyponatremia and correct when clinically appropriate.     GI  * Decompensated cirrhosis  MELD 3.0: 33 at 11/25/2024  2:45 AM  MELD-Na: 33 at 11/25/2024  2:45 AM  Calculated from:  Serum Creatinine: 3.9 mg/dL (Using max of 3 mg/dL) at 11/25/2024  2:45 AM  Serum Sodium: 129 mmol/L at 11/25/2024  2:45 AM  Total Bilirubin: 4.1 mg/dL at 11/25/2024  2:45 AM  Serum Albumin: 4 g/dL (Using max of 3.5 g/dL) at 11/25/2024  2:45 AM  INR(ratio): 1.7 at 11/23/2024  3:58 AM  Age at listing (hypothetical): 69 years  Sex: Male at 11/25/2024  2:45 AM    --Hepatology following, appreciate recs            Critical Care Daily Checklist:     A: Awake: RASS Goal/Actual Goal:    Actual:     B: Spontaneous Breathing Trial Performed?     C: SAT & SBT Coordinated?  N/A                      D: Delirium: CAM-ICU     E: Early Mobility Performed? No   F: Feeding Goal:    Status:               Current Diet Order   Procedures    Diet Renal Fluid - 1500mL       Order Specific Question:   Fluid restriction:       Answer:   Fluid - 1500mL      AS: Analgesia/Sedation None    T: Thromboembolic Prophylaxis heparin   H: HOB > 300 Yes   U: Stress Ulcer Prophylaxis (if needed) Pantoprazole 40 IV   G: Glucose Control stable   B: Bowel Function Will titrate lactulose for greater occurence   I: Indwelling Catheter (Lines & Boo) Necessity Peripheral IV,  CVC, A line   D: De-escalation of Antimicrobials/Pharmacotherapies Zosyn, levo, nevo off today, steroids     Plan for the day/ETD Titrate to MAP >80     Code Status:  Family/Goals of Care: Full Code  Will discuss GOC if MAP goal deemed unattainable         Critical secondary to hepatorenal syndrome   Critical care was time spent personally by me on the following activities: development of treatment plan with patient or surrogate and bedside caregivers, discussions with consultants, evaluation of patient's response to treatment, examination of patient, ordering and performing treatments and interventions, ordering and review of laboratory studies, ordering and review of radiographic studies, pulse oximetry, re-evaluation of patient's condition. This critical care time did not overlap with that of any other provider or involve time for any procedures.     Livier Macario MD  Critical Care Medicine  [facility] - Medical ICU

## 2024-11-26 NOTE — ASSESSMENT & PLAN NOTE
Creatinine stable for now. BMP reviewed- noted Estimated Creatinine Clearance: 18 mL/min (A) (based on SCr of 5.7 mg/dL (H)). according to latest data. Based on current GFR, CKD stage is stage 3 - GFR 30-59.  Monitor UOP and serial BMP and adjust therapy as needed. Renally dose meds. Avoid nephrotoxic medications and procedures. See JAE (acute kidney injury).

## 2024-11-26 NOTE — SUBJECTIVE & OBJECTIVE
Interval History: denies any new symptoms but is bothered by persistent arm swelling.     Review of Systems   Gastrointestinal:  Negative for abdominal pain.   Musculoskeletal: Negative.    Skin:  Positive for color change and wound.     Objective:     Vital Signs (Most Recent):  Temp: 97.9 °F (36.6 °C) (11/26/24 1115)  Pulse: (!) 123 (11/26/24 1330)  Resp: 13 (11/26/24 1330)  BP: (!) 138/56 (11/25/24 1505)  SpO2: 99 % (11/26/24 1330) Vital Signs (24h Range):  Temp:  [97.6 °F (36.4 °C)-98.7 °F (37.1 °C)] 97.9 °F (36.6 °C)  Pulse:  [108-143] 123  Resp:  [10-27] 13  SpO2:  [97 %-100 %] 99 %  Arterial Line BP: (115-137)/(49-58) 132/53     Weight: (!) 148 kg (326 lb 4.5 oz)  Body mass index is 43.05 kg/m².    Estimated Creatinine Clearance: 17.7 mL/min (A) (based on SCr of 5.8 mg/dL (H)).     Physical Exam  Vitals and nursing note reviewed.   Constitutional:       Appearance: He is ill-appearing.   HENT:      Head: Normocephalic.      Mouth/Throat:      Mouth: Mucous membranes are moist.   Pulmonary:      Effort: Pulmonary effort is normal. No respiratory distress.   Abdominal:      General: There is distension.      Palpations: Abdomen is soft.      Tenderness: There is no abdominal tenderness. There is no guarding.   Musculoskeletal:         General: Swelling present.   Skin:     General: Skin is warm and dry.      Findings: Erythema present.   Neurological:      Mental Status: He is alert.   Psychiatric:         Mood and Affect: Mood normal.          Significant Labs:   Microbiology Results (last 7 days)       Procedure Component Value Units Date/Time    Blood culture x two cultures. Draw prior to antibiotics. [[id number]]  (Abnormal)  (Susceptibility) Collected: 11/20/24 6658    Order Status: Completed Specimen: Blood from Peripheral, Hand, Right Updated: 11/26/24 1056     Blood Culture, Routine Gram stain aer bottle: Gram positive cocci in clusters resembling Staph      Results called to and read back by:Crystal  JAZMYNE Navarrete 11/21/2024  23:02      Gram stain malik bottle: Gram positive rods      Results called to and read back by: Melissa Hernandez RN  11/25/2024      01:35      BREVUNDIMONAS SPECIES  further identified as Brevundimonas diminuta group        MICROCOCCUS LUTEUS  Organism is a probable contaminant      Narrative:      Aerobic and anaerobic    Blood culture [[id number]] Collected: 11/22/24 0903    Order Status: Completed Specimen: Blood Updated: 11/26/24 1012     Blood Culture, Routine No Growth to date      No Growth to date      No Growth to date      No Growth to date      No Growth to date    Blood culture [[id number]] Collected: 11/22/24 0904    Order Status: Completed Specimen: Blood Updated: 11/26/24 1012     Blood Culture, Routine No Growth to date      No Growth to date      No Growth to date      No Growth to date      No Growth to date    Blood culture [[id number]] Collected: 11/24/24 1804    Order Status: Completed Specimen: Blood from Peripheral, Antecubital, Left Updated: 11/25/24 2012     Blood Culture, Routine No Growth to date      No Growth to date    Blood culture [[id number]] Collected: 11/24/24 1804    Order Status: Completed Specimen: Blood from Peripheral, Antecubital, Right Updated: 11/25/24 2012     Blood Culture, Routine No Growth to date      No Growth to date    Rapid Organism ID by PCR (from Blood culture) [[id number]] Collected: 11/20/24 1429    Order Status: Completed Updated: 11/25/24 0247     Enterococcus faecalis Not Detected     Enterococcus faecium Not Detected     Listeria monocytogenes Not Detected     Staphylococcus spp. Not Detected     Staphylococcus aureus Not Detected     Staphylococcus epidermidis Not Detected     Staphylococcus lugdunensis Not Detected     Streptococcus species Not Detected     Streptococcus agalactiae Not Detected     Streptococcus pneumoniae Not Detected     Streptococcus pyogenes Not Detected     Acinetobacter calcoaceticus/baumannii complex Not  Detected     Bacteroides fragilis Not Detected     Enterobacterales Not Detected     Enterobacter cloacae complex Not Detected     Escherichia coli Not Detected     Klebsiella aerogenes Not Detected     Klebsiella oxytoca Not Detected     Klebsiella pneumoniae group Not Detected     Proteus Not Detected     Salmonella sp Not Detected     Serratia marcescens Not Detected     Haemophilus influenzae Not Detected     Neisseria meningtidis Not Detected     Pseudomonas aeruginosa Not Detected     Stenotrophomonas maltophilia Not Detected     Candida albicans Not Detected     Candida auris Not Detected     Candida glabrata Not Detected     Candida krusei Not Detected     Candida parapsilosis Not Detected     Candida tropicalis Not Detected     Cryptococcus neoformans/gattii Not Detected     CTX-M (ESBL ) Test Not Applicable     IMP (Carbapenem resistant) Test Not Applicable     KPC resistance gene (Carbapenem resistant) Test Not Applicable     mcr-1  Test Not Applicable     mec A/C  Test Not Applicable     mec A/C and MREJ (MRSA) gene Test Not Applicable     NDM (Carbapenem resistant) Test Not Applicable     OXA-48-like (Carbapenem resistant) Test Not Applicable     van A/B (VRE gene) Test Not Applicable     VIM (Carbapenem resistant) Test Not Applicable    Narrative:      Aerobic and anaerobic    Blood culture x two cultures. Draw prior to antibiotics. [[id number]]  (Abnormal) Collected: 11/20/24 1444    Order Status: Completed Specimen: Blood from Peripheral, Wrist, Left Updated: 11/23/24 1000     Blood Culture, Routine Gram stain aer bottle: Gram positive rods      Results called to and read back by:Crystal Navarrete RN 11/21/2024  21:58      GRAM-POSITIVE JANE  further identified as Lysinobacillus species      Narrative:      Aerobic and anaerobic    Urine culture [[id number]]  (Abnormal) Collected: 11/20/24 1801    Order Status: Completed Specimen: Urine Updated: 11/22/24 0301     Urine Culture, Routine  COAGULASE-NEGATIVE STAPHYLOCOCCUS SPECIES  10,000 - 49,999 cfu/ml  Susceptibility testing not routinely performed.      Narrative:      Specimen Source->Urine    MRSA/SA Rapid ID by PCR from Blood culture [[id number]] Collected: 11/20/24 1429    Order Status: Completed Updated: 11/22/24 0010     Staph aureus ID by PCR Negative     Methicillin Resistant ID by PCR Negative    Narrative:      Aerobic and anaerobic    Rapid Organism ID by PCR (from Blood culture) [[id number]] Collected: 11/20/24 1444    Order Status: Completed Updated: 11/21/24 2202     Enterococcus faecalis Not Detected     Enterococcus faecium Not Detected     Listeria monocytogenes Not Detected     Staphylococcus spp. Not Detected     Staphylococcus aureus Not Detected     Staphylococcus epidermidis Not Detected     Staphylococcus lugdunensis Not Detected     Streptococcus species Not Detected     Streptococcus agalactiae Not Detected     Streptococcus pneumoniae Not Detected     Streptococcus pyogenes Not Detected     Acinetobacter calcoaceticus/baumannii complex Not Detected     Bacteroides fragilis Not Detected     Enterobacterales Not Detected     Enterobacter cloacae complex Not Detected     Escherichia coli Not Detected     Klebsiella aerogenes Not Detected     Klebsiella oxytoca Not Detected     Klebsiella pneumoniae group Not Detected     Proteus Not Detected     Salmonella sp Not Detected     Serratia marcescens Not Detected     Haemophilus influenzae Not Detected     Neisseria meningtidis Not Detected     Pseudomonas aeruginosa Not Detected     Stenotrophomonas maltophilia Not Detected     Candida albicans Not Detected     Candida auris Not Detected     Candida glabrata Not Detected     Candida krusei Not Detected     Candida parapsilosis Not Detected     Candida tropicalis Not Detected     Cryptococcus neoformans/gattii Not Detected     CTX-M (ESBL ) Test Not Applicable     IMP (Carbapenem resistant) Test Not Applicable     KPC  resistance gene (Carbapenem resistant) Test Not Applicable     mcr-1  Test Not Applicable     mec A/C  Test Not Applicable     mec A/C and MREJ (MRSA) gene Test Not Applicable     NDM (Carbapenem resistant) Test Not Applicable     OXA-48-like (Carbapenem resistant) Test Not Applicable     van A/B (VRE gene) Test Not Applicable     VIM (Carbapenem resistant) Test Not Applicable    Narrative:      Aerobic and anaerobic    (rule out SBP) Gram stain [[id number]]     Order Status: No result Specimen: Ascites     (rule out SBP) Aerobic culture [[id number]]     Order Status: No result Specimen: Ascites     (rule out SBP) Culture, Anaerobic [[id number]]     Order Status: No result Specimen: Ascites     Clostridium difficile EIA [[id number]]     Order Status: Canceled Specimen: Stool     Stool culture [[id number]]     Order Status: No result Specimen: Stool             Significant Imaging: I have reviewed all pertinent imaging results/findings within the past 24 hours.

## 2024-11-26 NOTE — SUBJECTIVE & OBJECTIVE
Interval History/Significant Events: no acute events overnight.     Review of Systemsno interval change   Objective:     Vital Signs (Most Recent):  Temp: 97.9 °F (36.6 °C) (11/26/24 1115)  Pulse: (!) 123 (11/26/24 1330)  Resp: 13 (11/26/24 1330)  BP: (!) 138/56 (11/25/24 1505)  SpO2: 99 % (11/26/24 1330) Vital Signs (24h Range):  Temp:  [97.6 °F (36.4 °C)-98.7 °F (37.1 °C)] 97.9 °F (36.6 °C)  Pulse:  [108-143] 123  Resp:  [10-27] 13  SpO2:  [97 %-100 %] 99 %  Arterial Line BP: (115-137)/(49-58) 132/53   Weight: (!) 148 kg (326 lb 4.5 oz)  Body mass index is 43.05 kg/m².      Intake/Output Summary (Last 24 hours) at 11/26/2024 1603  Last data filed at 11/26/2024 1018  Gross per 24 hour   Intake 1283.07 ml   Output --   Net 1283.07 ml          Physical Exam     Physical Exam  Constitutional:       General: He is not in acute distress.     Appearance: He is obese. He is ill-appearing (chronic). He is not toxic-appearing.   HENT:      Head: Normocephalic and atraumatic.   Eyes:      General: Scleral icterus present.      Extraocular Movements: Extraocular movements intact.      Conjunctiva/sclera: Conjunctivae normal.   Cardiovascular:      Rate and Rhythm: Normal rate. Rhythm irregular.      Heart sounds: Normal heart sounds.   Pulmonary:      Effort: Pulmonary effort is normal. No respiratory distress.      Breath sounds: No wheezing.   Abdominal:      General: Bowel sounds are normal. There is distension.      Palpations: Abdomen is soft.      Tenderness: There is no abdominal tenderness.      Hernia: A hernia (umbilical and L inguinal hernia) is present.   Musculoskeletal:         General: Normal range of motion.      Right upper arm: Swelling and edema present.      Left upper arm: Swelling and edema present.      Cervical back: Normal range of motion and neck supple.      Right lower leg: Edema present.      Left lower leg: Edema present.   Skin:     General: Skin is warm and dry.      Coloration: Skin is not  jaundiced.      Findings: Erythema (RUE) present.   Neurological:      General: No focal deficit present.      Mental Status: He is alert and oriented to person, place, and time. He is confused.      Comments: Asterixis   Psychiatric:         Attention and Perception: He is inattentive.         Behavior: Behavior normal.   Vents:     Lines/Drains/Airways       Central Venous Catheter Line  Duration             Trialysis (Dialysis) Catheter 11/24/24 0441 right internal jugular 2 days              Drain  Duration             Male External Urinary Catheter 11/25/24 Other (Comment) 1 day              Arterial Line  Duration             Arterial Line 11/24/24 0317 Right Radial 2 days              Peripheral Intravenous Line  Duration                  Peripheral IV - Single Lumen 11/21/24 2300 20 G Right Breast 4 days                  Significant Labs:    CBC/Anemia Profile:  Recent Labs   Lab 11/25/24  0245 11/26/24  0335 11/26/24  1035   WBC 6.85 4.87 4.59   HGB 8.0* 7.8* 7.5*   HCT 24.7* 24.1* 23.3*   PLT 74* 48* 38*   MCV 76* 77* 77*   RDW 22.4* 23.3* 22.9*        Chemistries:  Recent Labs   Lab 11/25/24  0245 11/25/24  1356 11/25/24  2321 11/26/24  0335 11/26/24  1347   *   < > 130* 129*  129* 131*  131*   K 4.5   < > 4.2 4.2  4.2 4.0  4.0   CL 98   < > 99 100  100 100  100   CO2 16*   < > 17* 14*  15* 19*  19*   BUN 28*   < > 41* 42*  42* 42*  42*   CREATININE 3.9*   < > 5.2* 5.5*  5.7* 5.8*  5.8*   CALCIUM 8.9   < > 9.4 9.5  9.3 9.3  9.3   ALBUMIN 4.0   < > 3.7 3.7  3.6 3.3*  3.3*   PROT 8.0  --   --  7.4  --    BILITOT 4.1*  --   --  3.1*  --    ALKPHOS 53  --   --  49  --    ALT 11  --   --  10  --    AST 18  --   --  14  --    MG 2.1   < > 2.3 2.2 2.3  2.3   PHOS 4.0   < > 5.2* 5.5*  5.4* 5.6*  5.6*    < > = values in this interval not displayed.       All pertinent labs within the past 24 hours have been reviewed.    Significant Imaging:  I have reviewed all pertinent imaging  results/findings within the past 24 hours.

## 2024-11-26 NOTE — ASSESSMENT & PLAN NOTE
Baseline creatinine is 1.5    Cr trend 6.3 > 6.4     Plan  - JAE is worsening. Will continue current treatment  - Avoid nephrotoxins and renally dose meds for GFR listed above  - Monitor urine output, serial BMP, and adjust therapy as needed     - Etiology includes volume overload, obstruction, hypotension, possible HRS  - monitor peacock catheter and monitor strict I&O  - was started on midodrine and albumin.  Octreotide started 11/22/2024.  -appreciate nephrology recommendations and recommendation to give 1 dose of IV Lasix 120 mg.   - ICU consulted for Levo administration with MAP goal > 80 . Peripheral Levophed ordered, titration difficult due to tachycardic response, will continue attempting to meet MAP goal however this may be difficult given the patient has Afib.  - Stress dosing with Fludrocortisone and Hydrocortisone as HRS patients tend to have AI

## 2024-11-26 NOTE — NURSING
MICU DAILY GOALS     Family/Goals of care/Code Status   Code Status: Full Code    24H Vital Sign Range  Temp:  [97.6 °F (36.4 °C)-98.7 °F (37.1 °C)]   Pulse:  [108-143]   Resp:  [10-27]   SpO2:  [97 %-100 %]   Arterial Line BP: (115-137)/(49-61)      Shift Events (include procedures and significant events)   SLED initiated with increased pressor requirement. Boo cath inserted as ordered. No BM noted.    AWAKE RASS: Goal - RASS Goal: 0-->alert and calm  Actual - RASS (Pedraza Agitation-Sedation Scale): alert and calm    Restraint necessity: Not necessary   BREATHE SBT: NA    Coordinate A & B, analgesics/sedatives Pain: managed   SAT: NA   Delirium CAM-ICU: Overall CAM-ICU: Negative   Early(intubated/ Progressive (non-intubated) Mobility MOVE Screen (INTUBATED ONLY): NA    Activity: Activity Management: Arm raise - L1, Rolling - L1   Feeding/Nutrition Diet order: Diet/Nutrition Received: regular, Specialty Diet/Nutrition Received: renal diet   Thrombus DVT prophylaxis: VTE Core Measure: Pharmacological prophylaxis initiated/maintained   HOB Elevation Head of Bed (HOB) Positioning: HOB at 30 degrees   Ulcer Prophylaxis GI: yes   Glucose control na  Glycemic Management: blood glucose monitored   Skin Skin assessment:     Sacrum intact/not altered? Yes  Heels intact/not altered? Yes  Surgical wound? No    CHECK ONE!   (no altered skin or altered skin) and sub boxes:  [] No Altered Skin Integrity Present    []Prevention Measures Documented    [] Altered Skin Integrity Present or Discovered   [] LDA present in EPIC, daily doc completed              [] LDA added if not in EPIC (describe wound).                    When describing wound, do not stage, use descriptive words only.    [] Wound Image Taken (required on admit,                   transfer/discharge and every Tuesday)    Wound Care Consulted? No   Bowel Function no issues    Indwelling Catheter Necessity [REMOVED]      Urethral Catheter 11/20/24 6438  Double-lumen-Reason for Continuing Urinary Catheterization: Urinary retention    Trialysis (Dialysis) Catheter 11/24/24 0441 right internal jugular-Line Necessity Review: CRRT/HD  yes   De-escalation Antibiotics No        VS and assessment per flow sheet, patient progressing towards goals as tolerated, plan of care reviewed with  patient , all concerns addressed, will continue to monitor.

## 2024-11-26 NOTE — SUBJECTIVE & OBJECTIVE
Interval History: NAEON. No urinary output charted. Continued on levophed and vasopressin for MAP goal 80-85. BUN 42/creatinine 5.5.    Review of patient's allergies indicates:  No Known Allergies  Current Facility-Administered Medications   Medication Frequency    0.9%  NaCl infusion (CRRT USE ONLY) Continuous    albuterol-ipratropium 2.5 mg-0.5 mg/3 mL nebulizer solution 3 mL Q6H PRN    aluminum-magnesium hydroxide-simethicone 200-200-20 mg/5 mL suspension 30 mL QID PRN    ascorbic acid (vitamin C) tablet 500 mg Daily    cyanocobalamin tablet 250 mcg Daily    dextrose 10% bolus 125 mL 125 mL PRN    dextrose 10% bolus 250 mL 250 mL PRN    fludrocortisone tablet 100 mcg Daily    glucagon (human recombinant) injection 1 mg PRN    glucose chewable tablet 16 g PRN    glucose chewable tablet 24 g PRN    heparin (porcine) injection 7,500 Units Q8H    hydrocortisone sodium succinate injection 100 mg Q8H    lactulose 20 gram/30 mL solution Soln 45 g Q6H PRN    magnesium sulfate 2g in water 50mL IVPB (premix) PRN    melatonin tablet 6 mg Nightly PRN    midodrine tablet 15 mg TID    naloxone 0.4 mg/mL injection 0.02 mg PRN    NORepinephrine bitartrate-NaCl 32 mg/250 mL (128 mcg/mL) infusion Continuous    ondansetron injection 4 mg Q8H PRN    pantoprazole EC tablet 40 mg Daily    piperacillin-tazobactam (ZOSYN) 4.5 g in D5W 100 mL IVPB (MB+) Q12H    simethicone chewable tablet 80 mg QID PRN    sodium chloride 0.9% flush 10 mL Q12H PRN    sodium chloride 0.9% flush 10 mL PRN    sodium phosphate 20.01 mmol in D5W 250 mL IVPB PRN    sodium phosphate 30 mmol in D5W 250 mL IVPB PRN    sodium phosphate 39.99 mmol in D5W 250 mL IVPB PRN    tamsulosin 24 hr capsule 0.4 mg QHS    vancomycin - pharmacy to dose pharmacy to manage frequency    vasopressin (PITRESSIN) 0.2 Units/mL in 0.9% NaCl 100 mL infusion Continuous       Objective:     Vital Signs (Most Recent):  Temp: 97.6 °F (36.4 °C) (11/26/24 0745)  Pulse: (!) 120 (11/26/24  1015)  Resp: 14 (11/26/24 1015)  BP: (!) 138/56 (11/25/24 1505)  SpO2: 100 % (11/26/24 1015) Vital Signs (24h Range):  Temp:  [97.6 °F (36.4 °C)-98.7 °F (37.1 °C)] 97.6 °F (36.4 °C)  Pulse:  [108-143] 120  Resp:  [10-27] 14  SpO2:  [97 %-100 %] 100 %  BP: (138)/(56) 138/56  Arterial Line BP: (115-137)/(49-58) 123/57     Weight: (!) 148 kg (326 lb 4.5 oz) (11/22/24 0400)  Body mass index is 43.05 kg/m².  Body surface area is 2.76 meters squared.    I/O last 3 completed shifts:  In: 4413.9 [P.O.:650; I.V.:3305.1; IV Piggyback:458.8]  Out: 3343 [Other:3343]     Physical Exam  Vitals and nursing note reviewed.   Constitutional:       General: He is awake. He is not in acute distress.     Appearance: He is obese. He is ill-appearing. He is not toxic-appearing.   HENT:      Head: Normocephalic and atraumatic.   Eyes:      General: No scleral icterus.     Extraocular Movements: Extraocular movements intact.      Conjunctiva/sclera: Conjunctivae normal.   Cardiovascular:      Rate and Rhythm: Tachycardia present. Rhythm irregular.   Pulmonary:      Effort: Pulmonary effort is normal. No respiratory distress.   Abdominal:      General: There is distension.      Palpations: Abdomen is soft.      Tenderness: There is no abdominal tenderness. There is no guarding or rebound.   Musculoskeletal:         General: No swelling or tenderness.      Right lower leg: Edema present.      Left lower leg: Edema present.      Comments: 2+ pitting edema in bilateral lower extremities   Skin:     General: Skin is warm.      Coloration: Skin is not jaundiced.   Neurological:      Mental Status: He is alert and oriented to person, place, and time.      Motor: No weakness.   Psychiatric:         Behavior: Behavior is cooperative.        Significant Labs:  CBC:   Recent Labs   Lab 11/26/24  1035   WBC 4.59   RBC 3.03*   HGB 7.5*   HCT 23.3*   PLT 38*   MCV 77*   MCH 24.8*   MCHC 32.2     CMP:   Recent Labs   Lab 11/26/24  0335   *  157*    CALCIUM 9.5  9.3   ALBUMIN 3.7  3.6   PROT 7.4   *  129*   K 4.2  4.2   CO2 14*  15*     100   BUN 42*  42*   CREATININE 5.5*  5.7*   ALKPHOS 49   ALT 10   AST 14   BILITOT 3.1*     All labs within the past 24 hours have been reviewed.     Significant Imaging:  All imaging within the past 24 hours have been reviewed.

## 2024-11-26 NOTE — PROGRESS NOTES
11/26/24 1727   Treatment   Treatment Type SLED   Treatment Status New start   Dialysis Machine Number [id number]   Dialyzer Time (hours) 0   BVP (Liters) 0 L   Solutions Labeled and Current  Yes   Access Temporary Cath;Right;IJ   Catheter Dressing Intact  Yes   Alarms Engaged Yes   CRRT Comments sled started as ordered   Prescription   Time (Hours) 10   Dialysate K + (mEq/L) 4   Dialysate CA + (mEq/L) 2.25   Dialysate HCO3 - (Bicarb) (mEq/L) 30   Dialysate Na + (mEq/L) 140   Cartridge Type Other  (r300)   Dialysate Flow Rate (mL/min) 200   UF Goal Rate 400 mL/hr   CRRT Hourly Documentation   Blood Flow (mL/min) 150   UF Rate 200 cc/hr   Arterial Pressure (mmHg) -10 mmHg   Venous Pressure (mmHg) 40 mmHg   Effluent Pressure (EP) (mmHg) 30 mmHg   Total UF (Hourly Cleared) (mL) 0     SLED initiated as ordered. RIJ CVC aspirated, flushed, and accessed using aseptic technique. Lines connected and secured.

## 2024-11-26 NOTE — ASSESSMENT & PLAN NOTE
"This patient does have evidence of infective focus  My overall impression is sepsis.  Source: Abdominal  Antibiotics given-   Antibiotics (72h ago, onward)      Start     Stop Route Frequency Ordered    11/26/24 2300  piperacillin-tazobactam (ZOSYN) 4.5 g in D5W 100 mL IVPB (MB+)         -- IV Every 8 hours (non-standard times) 11/26/24 1522    11/24/24 0710  vancomycin - pharmacy to dose  (vancomycin IVPB (PEDS and ADULTS))        Placed in "And" Linked Group    -- IV pharmacy to manage frequency 11/24/24 0610          Latest lactate reviewed-  Recent Labs   Lab 11/24/24  0633   LACTATE 3.1*       Organ dysfunction indicated by Acute kidney injury    Fluid challenge Contraindicated- Fluid bolus is contraindicated in this patient due to End Stage Liver Disease     Post- resuscitation assessment No - Post resuscitation assessment not needed     Source control achieved by: antibiotics  "

## 2024-11-26 NOTE — ASSESSMENT & PLAN NOTE
JAE is likely due to pre-renal azotemia due to intravascular volume depletion secondary to decompensated hepatic cirrhosis with volume overload and acute tubular necrosis caused by hemodynamic instability, renal hypoperfusion, possible sepsis. Concerns of hepatorenal syndrome. Hx of CKD3a with baseline creatinine is  1.2-1.4 . Most recent creatinine and eGFR are listed below.  Recent Labs     11/25/24  1356 11/25/24  2321 11/26/24  0335   CREATININE 4.9* 5.2* 5.5*  5.7*   EGFRNORACEVR 12.0* 11.1* 10.4*  10.0*       Maintain MAP goals > 80-85 mmHg, continue levophed and vasopressin for blood pressure support  Plan for SLED x 10 hours with net UF of 1.5L for removal of uremic toxins and volume control,   Consent obtained & placed in patient chart  S/p [facility] trialysis line placement 11/24    - Avoid nephrotoxins and renally dose meds for GFR listed above  - Monitor urine output, serial BMP, and adjust therapy as needed  - Retroperitoneal ultrasound c/w bilateral CKD, showed questionable 6 mm nonobstructing stone in the left upper pole without evidence of hydronephrosis or solid renal mass.

## 2024-11-27 LAB
ALBUMIN SERPL BCP-MCNC: 2.8 G/DL (ref 3.5–5.2)
ALBUMIN SERPL BCP-MCNC: 3 G/DL (ref 3.5–5.2)
ALBUMIN SERPL BCP-MCNC: 3.2 G/DL (ref 3.5–5.2)
ALP SERPL-CCNC: 44 U/L (ref 40–150)
ALT SERPL W/O P-5'-P-CCNC: 10 U/L (ref 10–44)
ANION GAP SERPL CALC-SCNC: 10 MMOL/L (ref 8–16)
ANION GAP SERPL CALC-SCNC: 11 MMOL/L (ref 8–16)
ANION GAP SERPL CALC-SCNC: 11 MMOL/L (ref 8–16)
ANION GAP SERPL CALC-SCNC: 17 MMOL/L (ref 8–16)
ANION GAP SERPL CALC-SCNC: 9 MMOL/L (ref 8–16)
ANISOCYTOSIS BLD QL SMEAR: SLIGHT
AST SERPL-CCNC: 13 U/L (ref 10–40)
BACTERIA BLD CULT: ABNORMAL
BACTERIA BLD CULT: NORMAL
BACTERIA BLD CULT: NORMAL
BASOPHILS # BLD AUTO: 0 K/UL (ref 0–0.2)
BASOPHILS NFR BLD: 0 % (ref 0–1.9)
BILIRUB SERPL-MCNC: 3.4 MG/DL (ref 0.1–1)
BUN SERPL-MCNC: 18 MG/DL (ref 8–23)
BUN SERPL-MCNC: 18 MG/DL (ref 8–23)
BUN SERPL-MCNC: 20 MG/DL (ref 8–23)
BUN SERPL-MCNC: 23 MG/DL (ref 8–23)
BUN SERPL-MCNC: 23 MG/DL (ref 8–23)
BUN SERPL-MCNC: 24 MG/DL (ref 8–23)
BUN SERPL-MCNC: 24 MG/DL (ref 8–23)
BURR CELLS BLD QL SMEAR: ABNORMAL
CALCIUM SERPL-MCNC: 7.9 MG/DL (ref 8.7–10.5)
CALCIUM SERPL-MCNC: 8.4 MG/DL (ref 8.7–10.5)
CALCIUM SERPL-MCNC: 8.4 MG/DL (ref 8.7–10.5)
CALCIUM SERPL-MCNC: 8.5 MG/DL (ref 8.7–10.5)
CALCIUM SERPL-MCNC: 8.7 MG/DL (ref 8.7–10.5)
CHLORIDE SERPL-SCNC: 103 MMOL/L (ref 95–110)
CHLORIDE SERPL-SCNC: 104 MMOL/L (ref 95–110)
CHLORIDE SERPL-SCNC: 104 MMOL/L (ref 95–110)
CHLORIDE SERPL-SCNC: 93 MMOL/L (ref 95–110)
CO2 SERPL-SCNC: 18 MMOL/L (ref 23–29)
CO2 SERPL-SCNC: 19 MMOL/L (ref 23–29)
CO2 SERPL-SCNC: 19 MMOL/L (ref 23–29)
CO2 SERPL-SCNC: 20 MMOL/L (ref 23–29)
CREAT SERPL-MCNC: 2.6 MG/DL (ref 0.5–1.4)
CREAT SERPL-MCNC: 2.6 MG/DL (ref 0.5–1.4)
CREAT SERPL-MCNC: 3.4 MG/DL (ref 0.5–1.4)
CREAT SERPL-MCNC: 3.4 MG/DL (ref 0.5–1.4)
CREAT SERPL-MCNC: 3.6 MG/DL (ref 0.5–1.4)
CREAT SERPL-MCNC: 3.9 MG/DL (ref 0.5–1.4)
CREAT SERPL-MCNC: 3.9 MG/DL (ref 0.5–1.4)
DIFFERENTIAL METHOD BLD: ABNORMAL
EOSINOPHIL # BLD AUTO: 0 K/UL (ref 0–0.5)
EOSINOPHIL NFR BLD: 0 % (ref 0–8)
ERYTHROCYTE [DISTWIDTH] IN BLOOD BY AUTOMATED COUNT: 23.2 % (ref 11.5–14.5)
EST. GFR  (NO RACE VARIABLE): 15.7 ML/MIN/1.73 M^2
EST. GFR  (NO RACE VARIABLE): 15.7 ML/MIN/1.73 M^2
EST. GFR  (NO RACE VARIABLE): 17.3 ML/MIN/1.73 M^2
EST. GFR  (NO RACE VARIABLE): 18.5 ML/MIN/1.73 M^2
EST. GFR  (NO RACE VARIABLE): 18.5 ML/MIN/1.73 M^2
EST. GFR  (NO RACE VARIABLE): 25.6 ML/MIN/1.73 M^2
EST. GFR  (NO RACE VARIABLE): 25.6 ML/MIN/1.73 M^2
GLUCOSE SERPL-MCNC: 129 MG/DL (ref 70–110)
GLUCOSE SERPL-MCNC: 130 MG/DL (ref 70–110)
GLUCOSE SERPL-MCNC: 130 MG/DL (ref 70–110)
GLUCOSE SERPL-MCNC: 141 MG/DL (ref 70–110)
GLUCOSE SERPL-MCNC: 141 MG/DL (ref 70–110)
GLUCOSE SERPL-MCNC: 153 MG/DL (ref 70–110)
GLUCOSE SERPL-MCNC: 484 MG/DL (ref 70–110)
HCT VFR BLD AUTO: 23.4 % (ref 40–54)
HGB BLD-MCNC: 7.5 G/DL (ref 14–18)
IMM GRANULOCYTES # BLD AUTO: 0.05 K/UL (ref 0–0.04)
IMM GRANULOCYTES NFR BLD AUTO: 0.8 % (ref 0–0.5)
INR PPP: 2 (ref 0.8–1.2)
LYMPHOCYTES # BLD AUTO: 0.2 K/UL (ref 1–4.8)
LYMPHOCYTES NFR BLD: 2.6 % (ref 18–48)
MAGNESIUM SERPL-MCNC: 1.9 MG/DL (ref 1.6–2.6)
MAGNESIUM SERPL-MCNC: 2 MG/DL (ref 1.6–2.6)
MAGNESIUM SERPL-MCNC: 2.1 MG/DL (ref 1.6–2.6)
MCH RBC QN AUTO: 24.7 PG (ref 27–31)
MCHC RBC AUTO-ENTMCNC: 32.1 G/DL (ref 32–36)
MCV RBC AUTO: 77 FL (ref 82–98)
MONOCYTES # BLD AUTO: 0.8 K/UL (ref 0.3–1)
MONOCYTES NFR BLD: 11.9 % (ref 4–15)
NEUTROPHILS # BLD AUTO: 5.6 K/UL (ref 1.8–7.7)
NEUTROPHILS NFR BLD: 84.7 % (ref 38–73)
NRBC BLD-RTO: 0 /100 WBC
OVALOCYTES BLD QL SMEAR: ABNORMAL
PF4 HEPARIN CMPLX AB SER QL: 0.34 OD (ref 0–0.4)
PHOSPHATE SERPL-MCNC: 2.8 MG/DL (ref 2.7–4.5)
PHOSPHATE SERPL-MCNC: 2.9 MG/DL (ref 2.7–4.5)
PHOSPHATE SERPL-MCNC: 3.3 MG/DL (ref 2.7–4.5)
PHOSPHATE SERPL-MCNC: 3.6 MG/DL (ref 2.7–4.5)
PHOSPHATE SERPL-MCNC: 4 MG/DL (ref 2.7–4.5)
PHOSPHATE SERPL-MCNC: 4.1 MG/DL (ref 2.7–4.5)
PHOSPHATE SERPL-MCNC: 4.1 MG/DL (ref 2.7–4.5)
PLATELET # BLD AUTO: 30 K/UL (ref 150–450)
PLATELET BLD QL SMEAR: ABNORMAL
PMV BLD AUTO: ABNORMAL FL (ref 9.2–12.9)
POIKILOCYTOSIS BLD QL SMEAR: SLIGHT
POTASSIUM SERPL-SCNC: 3.5 MMOL/L (ref 3.5–5.1)
POTASSIUM SERPL-SCNC: 3.6 MMOL/L (ref 3.5–5.1)
POTASSIUM SERPL-SCNC: 3.6 MMOL/L (ref 3.5–5.1)
POTASSIUM SERPL-SCNC: 3.7 MMOL/L (ref 3.5–5.1)
POTASSIUM SERPL-SCNC: 3.9 MMOL/L (ref 3.5–5.1)
POTASSIUM SERPL-SCNC: 4 MMOL/L (ref 3.5–5.1)
POTASSIUM SERPL-SCNC: 4 MMOL/L (ref 3.5–5.1)
PROT SERPL-MCNC: 6.8 G/DL (ref 6–8.4)
PROTHROMBIN TIME: 20.7 SEC (ref 9–12.5)
RBC # BLD AUTO: 3.04 M/UL (ref 4.6–6.2)
SCHISTOCYTES BLD QL SMEAR: ABNORMAL
SCHISTOCYTES BLD QL SMEAR: PRESENT
SODIUM SERPL-SCNC: 128 MMOL/L (ref 136–145)
SODIUM SERPL-SCNC: 132 MMOL/L (ref 136–145)
SODIUM SERPL-SCNC: 133 MMOL/L (ref 136–145)
SODIUM UR-SCNC: <10 MMOL/L (ref 20–250)
VANCOMYCIN SERPL-MCNC: 12.6 UG/ML
WBC # BLD AUTO: 6.56 K/UL (ref 3.9–12.7)

## 2024-11-27 PROCEDURE — 25000003 PHARM REV CODE 250

## 2024-11-27 PROCEDURE — 94761 N-INVAS EAR/PLS OXIMETRY MLT: CPT

## 2024-11-27 PROCEDURE — 80053 COMPREHEN METABOLIC PANEL: CPT

## 2024-11-27 PROCEDURE — 63600175 PHARM REV CODE 636 W HCPCS: Mod: JZ,JG | Performed by: STUDENT IN AN ORGANIZED HEALTH CARE EDUCATION/TRAINING PROGRAM

## 2024-11-27 PROCEDURE — 63600175 PHARM REV CODE 636 W HCPCS

## 2024-11-27 PROCEDURE — 80069 RENAL FUNCTION PANEL: CPT | Performed by: STUDENT IN AN ORGANIZED HEALTH CARE EDUCATION/TRAINING PROGRAM

## 2024-11-27 PROCEDURE — 80202 ASSAY OF VANCOMYCIN: CPT | Performed by: INTERNAL MEDICINE

## 2024-11-27 PROCEDURE — 99291 CRITICAL CARE FIRST HOUR: CPT | Mod: ,,, | Performed by: INTERNAL MEDICINE

## 2024-11-27 PROCEDURE — 25000003 PHARM REV CODE 250: Performed by: STUDENT IN AN ORGANIZED HEALTH CARE EDUCATION/TRAINING PROGRAM

## 2024-11-27 PROCEDURE — 83735 ASSAY OF MAGNESIUM: CPT | Mod: 91 | Performed by: STUDENT IN AN ORGANIZED HEALTH CARE EDUCATION/TRAINING PROGRAM

## 2024-11-27 PROCEDURE — 85610 PROTHROMBIN TIME: CPT

## 2024-11-27 PROCEDURE — 84300 ASSAY OF URINE SODIUM: CPT

## 2024-11-27 PROCEDURE — 84100 ASSAY OF PHOSPHORUS: CPT

## 2024-11-27 PROCEDURE — 20000000 HC ICU ROOM

## 2024-11-27 PROCEDURE — 97164 PT RE-EVAL EST PLAN CARE: CPT

## 2024-11-27 PROCEDURE — P9047 ALBUMIN (HUMAN), 25%, 50ML: HCPCS | Mod: JZ,JG | Performed by: STUDENT IN AN ORGANIZED HEALTH CARE EDUCATION/TRAINING PROGRAM

## 2024-11-27 PROCEDURE — 97112 NEUROMUSCULAR REEDUCATION: CPT

## 2024-11-27 PROCEDURE — 97530 THERAPEUTIC ACTIVITIES: CPT

## 2024-11-27 PROCEDURE — 83735 ASSAY OF MAGNESIUM: CPT

## 2024-11-27 PROCEDURE — 85025 COMPLETE CBC W/AUTO DIFF WBC: CPT

## 2024-11-27 PROCEDURE — 80069 RENAL FUNCTION PANEL: CPT | Mod: 91 | Performed by: INTERNAL MEDICINE

## 2024-11-27 PROCEDURE — 99233 SBSQ HOSP IP/OBS HIGH 50: CPT | Mod: ,,, | Performed by: INTERNAL MEDICINE

## 2024-11-27 PROCEDURE — 63600175 PHARM REV CODE 636 W HCPCS: Performed by: INTERNAL MEDICINE

## 2024-11-27 PROCEDURE — 97168 OT RE-EVAL EST PLAN CARE: CPT

## 2024-11-27 PROCEDURE — 25000003 PHARM REV CODE 250: Performed by: INTERNAL MEDICINE

## 2024-11-27 PROCEDURE — 90945 DIALYSIS ONE EVALUATION: CPT

## 2024-11-27 PROCEDURE — 97535 SELF CARE MNGMENT TRAINING: CPT

## 2024-11-27 RX ORDER — LACTULOSE 10 G/15ML
30 SOLUTION ORAL
Status: DISCONTINUED | OUTPATIENT
Start: 2024-11-27 | End: 2024-11-28

## 2024-11-27 RX ORDER — NOREPINEPHRINE BIT/0.9 % NACL 32MG/250ML
0-3 PLASTIC BAG, INJECTION (ML) INTRAVENOUS CONTINUOUS
Status: DISCONTINUED | OUTPATIENT
Start: 2024-11-27 | End: 2024-11-28

## 2024-11-27 RX ORDER — ALBUMIN HUMAN 250 G/1000ML
12.5 SOLUTION INTRAVENOUS EVERY 6 HOURS
Status: DISCONTINUED | OUTPATIENT
Start: 2024-11-28 | End: 2024-11-27

## 2024-11-27 RX ORDER — HYDROCODONE BITARTRATE AND ACETAMINOPHEN 500; 5 MG/1; MG/1
TABLET ORAL CONTINUOUS
Status: DISCONTINUED | OUTPATIENT
Start: 2024-11-27 | End: 2024-11-28

## 2024-11-27 RX ORDER — LACTULOSE 10 G/15ML
200 SOLUTION ORAL; RECTAL 3 TIMES DAILY
Status: DISCONTINUED | OUTPATIENT
Start: 2024-11-27 | End: 2024-11-28

## 2024-11-27 RX ORDER — ALBUMIN HUMAN 250 G/1000ML
12.5 SOLUTION INTRAVENOUS EVERY 6 HOURS
Status: COMPLETED | OUTPATIENT
Start: 2024-11-27 | End: 2024-11-28

## 2024-11-27 RX ORDER — MAGNESIUM SULFATE HEPTAHYDRATE 40 MG/ML
2 INJECTION, SOLUTION INTRAVENOUS
Status: DISCONTINUED | OUTPATIENT
Start: 2024-11-27 | End: 2024-11-28

## 2024-11-27 RX ORDER — HYDROCODONE BITARTRATE AND ACETAMINOPHEN 500; 5 MG/1; MG/1
TABLET ORAL
Status: DISCONTINUED | OUTPATIENT
Start: 2024-11-27 | End: 2024-12-02

## 2024-11-27 RX ADMIN — SODIUM CHLORIDE: 9 INJECTION, SOLUTION INTRAVENOUS at 07:11

## 2024-11-27 RX ADMIN — LACTULOSE 30 G: 20 SOLUTION ORAL at 09:11

## 2024-11-27 RX ADMIN — MIDODRINE HYDROCHLORIDE 15 MG: 5 TABLET ORAL at 09:11

## 2024-11-27 RX ADMIN — LACTULOSE 30 G: 20 SOLUTION ORAL at 10:11

## 2024-11-27 RX ADMIN — TAMSULOSIN HYDROCHLORIDE 0.4 MG: 0.4 CAPSULE ORAL at 09:11

## 2024-11-27 RX ADMIN — NOREPINEPHRINE BITARTRATE 0.06 MCG/KG/MIN: 1 INJECTION, SOLUTION, CONCENTRATE INTRAVENOUS at 05:11

## 2024-11-27 RX ADMIN — FLUDROCORTISONE ACETATE 100 MCG: 0.1 TABLET ORAL at 08:11

## 2024-11-27 RX ADMIN — PIPERACILLIN SODIUM AND TAZOBACTAM SODIUM 4.5 G: 4; .5 INJECTION, POWDER, FOR SOLUTION INTRAVENOUS at 08:11

## 2024-11-27 RX ADMIN — DEXTROSE MONOHYDRATE 20.01 MMOL: 12500 INJECTION, SOLUTION INTRAVENOUS at 05:11

## 2024-11-27 RX ADMIN — LACTULOSE 30 G: 20 SOLUTION ORAL at 06:11

## 2024-11-27 RX ADMIN — OXYCODONE HYDROCHLORIDE AND ACETAMINOPHEN 500 MG: 500 TABLET ORAL at 08:11

## 2024-11-27 RX ADMIN — PIPERACILLIN SODIUM AND TAZOBACTAM SODIUM 4.5 G: 4; .5 INJECTION, POWDER, FOR SOLUTION INTRAVENOUS at 12:11

## 2024-11-27 RX ADMIN — PANTOPRAZOLE SODIUM 40 MG: 40 TABLET, DELAYED RELEASE ORAL at 08:11

## 2024-11-27 RX ADMIN — Medication 250 MCG: at 08:11

## 2024-11-27 RX ADMIN — ALBUMIN (HUMAN) 12.5 G: 12.5 SOLUTION INTRAVENOUS at 08:11

## 2024-11-27 RX ADMIN — MIDODRINE HYDROCHLORIDE 15 MG: 5 TABLET ORAL at 02:11

## 2024-11-27 RX ADMIN — LACTULOSE 30 G: 20 SOLUTION ORAL at 04:11

## 2024-11-27 RX ADMIN — LACTULOSE 30 G: 20 SOLUTION ORAL at 02:11

## 2024-11-27 RX ADMIN — LACTULOSE 30 G: 20 SOLUTION ORAL at 12:11

## 2024-11-27 RX ADMIN — MIDODRINE HYDROCHLORIDE 15 MG: 5 TABLET ORAL at 08:11

## 2024-11-27 RX ADMIN — HYDROCORTISONE SODIUM SUCCINATE 100 MG: 100 INJECTION, POWDER, FOR SOLUTION INTRAMUSCULAR; INTRAVENOUS at 05:11

## 2024-11-27 RX ADMIN — LACTULOSE 30 G: 20 SOLUTION ORAL at 11:11

## 2024-11-27 RX ADMIN — SODIUM CHLORIDE: 9 INJECTION, SOLUTION INTRAVENOUS at 11:11

## 2024-11-27 RX ADMIN — LACTULOSE 30 G: 20 SOLUTION ORAL at 07:11

## 2024-11-27 RX ADMIN — PIPERACILLIN SODIUM AND TAZOBACTAM SODIUM 4.5 G: 4; .5 INJECTION, POWDER, FOR SOLUTION INTRAVENOUS at 04:11

## 2024-11-27 RX ADMIN — SODIUM CHLORIDE: 9 INJECTION, SOLUTION INTRAVENOUS at 05:11

## 2024-11-27 NOTE — PLAN OF CARE
Problem: Occupational Therapy  Goal: Occupational Therapy Goal  Description: Goals to be met by: 12/25/24     Patient will increase functional independence with ADLs by performing:    UE Dressing with Set-up Assistance.  LE Dressing with Stand-by Assistance.  Grooming while standing at sink with Stand-by Assistance.  Toileting from toilet with Stand-by Assistance for hygiene and clothing management.   Step transfer: with SBA and use of LRAD  Supine to sit with SBA  Toilet transfer to toilet with SBA and use of LRAD.  Independent with HEP to BUE to improve ROM    Outcome: Progressing   Patient's goals are set.

## 2024-11-27 NOTE — SUBJECTIVE & OBJECTIVE
Interval History: NAEON. Tolerated SLED tx overnight, total UF ~4L. Patient states he is tired this morning and would like to rest. Boo catheter replaced yesterday with approximately 205cc urinary output charted in the past 24 hours. MAP goal decreased per primary, maintained >75mmHg. Currently on levophed 0.08, titrated off vasopressin.     Review of patient's allergies indicates:  No Known Allergies  Current Facility-Administered Medications   Medication Frequency    0.9%  NaCl infusion (CRRT USE ONLY) Continuous    0.9%  NaCl infusion (for blood administration) Q24H PRN    albuterol-ipratropium 2.5 mg-0.5 mg/3 mL nebulizer solution 3 mL Q6H PRN    aluminum-magnesium hydroxide-simethicone 200-200-20 mg/5 mL suspension 30 mL QID PRN    ascorbic acid (vitamin C) tablet 500 mg Daily    cyanocobalamin tablet 250 mcg Daily    dextrose 10% bolus 125 mL 125 mL PRN    dextrose 10% bolus 250 mL 250 mL PRN    fludrocortisone tablet 100 mcg Daily    glucagon (human recombinant) injection 1 mg PRN    glucose chewable tablet 16 g PRN    glucose chewable tablet 24 g PRN    hydrocortisone sodium succinate injection 100 mg Q8H    lactulose 20 gram/30 mL solution Soln 45 g Q6H PRN    magnesium sulfate 2g in water 50mL IVPB (premix) PRN    melatonin tablet 6 mg Nightly PRN    midodrine tablet 15 mg TID    naloxone 0.4 mg/mL injection 0.02 mg PRN    NORepinephrine bitartrate-NaCl 32 mg/250 mL (128 mcg/mL) infusion Continuous    ondansetron injection 4 mg Q8H PRN    pantoprazole EC tablet 40 mg Daily    piperacillin-tazobactam (ZOSYN) 4.5 g in D5W 100 mL IVPB (MB+) Q8H    simethicone chewable tablet 80 mg QID PRN    sodium chloride 0.9% flush 10 mL Q12H PRN    sodium chloride 0.9% flush 10 mL PRN    sodium phosphate 20.01 mmol in D5W 250 mL IVPB PRN    sodium phosphate 30 mmol in D5W 250 mL IVPB PRN    sodium phosphate 39.99 mmol in D5W 250 mL IVPB PRN    tamsulosin 24 hr capsule 0.4 mg QHS    vancomycin - pharmacy to dose pharmacy  to manage frequency    vasopressin (PITRESSIN) 0.2 Units/mL in 0.9% NaCl 100 mL infusion Continuous       Objective:     Vital Signs (Most Recent):  Temp: 98.1 °F (36.7 °C) (11/27/24 0701)  Pulse: 99 (11/27/24 0801)  Resp: 10 (11/27/24 0801)  BP: (!) 138/56 (11/25/24 1505)  SpO2: 95 % (11/27/24 0801) Vital Signs (24h Range):  Temp:  [97.6 °F (36.4 °C)-98.1 °F (36.7 °C)] 98.1 °F (36.7 °C)  Pulse:  [] 99  Resp:  [10-20] 10  SpO2:  [95 %-100 %] 95 %  Arterial Line BP: (108-136)/(40-61) 129/52     Weight: (!) 148 kg (326 lb 4.5 oz) (11/22/24 0400)  Body mass index is 43.05 kg/m².  Body surface area is 2.76 meters squared.    I/O last 3 completed shifts:  In: 3397.3 [P.O.:486; I.V.:2561; IV Piggyback:350.3]  Out: 3990 [Urine:205; Other:3785]     Physical Exam  Vitals and nursing note reviewed.   Constitutional:       General: He is awake. He is not in acute distress.     Appearance: He is obese. He is ill-appearing. He is not toxic-appearing.   HENT:      Head: Normocephalic and atraumatic.   Eyes:      General: No scleral icterus.     Extraocular Movements: Extraocular movements intact.      Conjunctiva/sclera: Conjunctivae normal.   Cardiovascular:      Rate and Rhythm: Tachycardia present. Rhythm irregular.   Pulmonary:      Effort: Pulmonary effort is normal. No respiratory distress.   Abdominal:      General: There is distension.      Palpations: Abdomen is soft.      Tenderness: There is no abdominal tenderness. There is no guarding or rebound.   Musculoskeletal:         General: No swelling or tenderness.      Right lower leg: Edema present.      Left lower leg: Edema present.      Comments: 2+ pitting edema in bilateral lower extremities   Skin:     General: Skin is warm.      Coloration: Skin is not jaundiced.      Findings: Bruising present.   Neurological:      Mental Status: He is alert and oriented to person, place, and time.      Motor: No weakness.   Psychiatric:         Behavior: Behavior is slowed.         Significant Labs:  CBC:   Recent Labs   Lab 11/27/24  0209   WBC 6.56   RBC 3.04*   HGB 7.5*   HCT 23.4*   PLT 30*   MCV 77*   MCH 24.7*   MCHC 32.1     CMP:   Recent Labs   Lab 11/27/24  0209   *  129*   CALCIUM 8.5*  8.4*   ALBUMIN 3.2*  3.2*   PROT 6.8   *  133*   K 4.0  4.0   CO2 20*  20*     104   BUN 18  18   CREATININE 2.6*  2.6*   ALKPHOS 44   ALT 10   AST 13   BILITOT 3.4*     All labs within the past 24 hours have been reviewed.     Significant Imaging:  All imaging within the past 24 hours have been reviewed.

## 2024-11-27 NOTE — ASSESSMENT & PLAN NOTE
Creatinine stable for now. BMP reviewed- noted Estimated Creatinine Clearance: 39.5 mL/min (A) (based on SCr of 2.6 mg/dL (H)). according to latest data. Based on current GFR, CKD stage is stage 3 - GFR 30-59.  Monitor UOP and serial BMP and adjust therapy as needed. Renally dose meds. Avoid nephrotoxic medications and procedures. See JAE (acute kidney injury).

## 2024-11-27 NOTE — PROGRESS NOTES
11/27/24 1741   Treatment   Treatment Type SCUF   Treatment Status Restart   Dialysis Machine Number [id number]   Dialyzer Time (hours) 0   BVP (Liters) 0 L   Solutions Labeled and Current  Yes   Access Temporary Cath;Right;IJ   Catheter Dressing Intact  Yes   Alarms Engaged Yes   CRRT Comments scuf started as ordred   Prescription   Time (Hours) 10  (6 scuf/4 sled)   Cartridge Type Other  (r300)   Dialysate Flow Rate (mL/min) 0(seq)   UF Goal Rate 400 mL/hr   CRRT Hourly Documentation   Blood Flow (mL/min) 150   UF Rate 250 cc/hr   Arterial Pressure (mmHg) -20 mmHg   Venous Pressure (mmHg) 50 mmHg   Effluent Pressure (EP) (mmHg) 20 mmHg   Total UF (Hourly Cleared) (mL) 0     SCUF/SLED restarted as ordered. RIJ CVC aspirated, flushed, and accessed using aseptic technique. Lines connected and secured.

## 2024-11-27 NOTE — ASSESSMENT & PLAN NOTE
"This patient does have evidence of infective focus  My overall impression is sepsis.  Source: Abdominal  Antibiotics given-   Antibiotics (72h ago, onward)    Start     Stop Route Frequency Ordered    11/26/24 2300  piperacillin-tazobactam (ZOSYN) 4.5 g in D5W 100 mL IVPB (MB+)         -- IV Every 8 hours (non-standard times) 11/26/24 1522        Latest lactate reviewed-  No results for input(s): "LACTATE", "POCLAC" in the last 72 hours.    Organ dysfunction indicated by Acute kidney injury    Fluid challenge Contraindicated- Fluid bolus is contraindicated in this patient due to End Stage Liver Disease     Post- resuscitation assessment No - Post resuscitation assessment not needed     Source control achieved by: antibiotics  "

## 2024-11-27 NOTE — PT/OT/SLP RE-EVAL
Physical Therapy Co-Reevaluation and Co-Treatment  Patient Name:  Juan Carlos Yoo Sr.   MRN:  [id number]  Admit Date: 11/20/2024  Admitting Diagnosis:  Decompensated cirrhosis   Length of Stay: 7 days  Recent Surgery: * No surgery found *      Hospital Course:  11/20/2024: Admit date  11/21/2024: PT initial evaluation    Please refer to PT initial evaluation for PLOF and social history.  Recommendations:     Discharge Recommendations:  Moderate Intensity Therapy  Discharge Equipment Recommendations:  (to be determined)   Barriers to discharge:  physical assistance needed    Appropriate transfer level with nursing staff: bed level    Plan:     During this hospitalization, patient to be seen 4 x/week to address the identified rehab impairments via gait training, therapeutic activities, therapeutic exercises, neuromuscular re-education and progress towards the established goals.  Plan of Care Expires:  12/27/24  Plan of Care Reviewed with: patient    Assessment     Juan Carlos Yoo Sr. is a 69 y.o. male admitted with a medical diagnosis of Decompensated cirrhosis. Re-evaluation due to transfer to ICU. Pt presents supine in bed and agreeable to re-evaluation. Significant edema with drainage noted, especially in B UE. Pt required significant assistance of 2 people for bed mobility due to rigidity with movement. O2 saturation decreased to 84% due to pt holding breath. O2 saturation improved with deep breathing once sitting EOB. Upon examination, B LE grossly strong with limited motion into hip flexion due to edema. Pt stood fairly well with minimal assistance from therapy staff. Increased assistance for steps toward HOB for weight shifting, balance, and stability. MAP maintained in mid 60s to low 70s throughout session. Pt currently presents well below PLOF with decreased endurance and impaired functional mobility due to recent medical complications and deconditioning. Pt would benefit from acute care PT services for improved  mobility and OOB activity.     Problem List: weakness, impaired endurance, impaired functional mobility, edema, impaired balance, gait instability, impaired cardiopulmonary response to activity, impaired self care skills.  Rehab Prognosis: Fair; patient would benefit from acute skilled PT services to address these deficits and reach maximum level of function.      Goals:   Multidisciplinary Problems       Physical Therapy Goals          Problem: Physical Therapy    Goal Priority Disciplines Outcome Interventions   Physical Therapy Goal     PT, PT/OT Progressing    Description: Goals to be met by: 24     Patient will increase functional independence with mobility by performin. Supine to sit with Minimal Assistance  2. Sit to stand transfer with Stand By Assistance  3. Bed to chair transfer with Stand By Assistance using LRAD  4. Gait  x 150 feet with Stand By Assistance using No LRAD.                          Subjective     RN notified prior to session. No family/friends present upon PT entrance into room. Patient agreeable to PT re-evaluation    Chief Complaint: abdominal strain with bed mobility  Pain/Comfort:  Pain Rating 1: 0/10  Pain Rating Post-Intervention 1: 0/10    Objective:     Additional staff present: OT and Supervising PT; OT for co-evaluation due to suspected patient need for two skilled therapists to appropriately assess patient's functional deficits as well as ensure patient safety, accommodate for limited activity tolerance, and provide appropriate, skilled assistance to maximize functional potential during evaluation.     Patient found supine with: Trialysis, arterial line, peripheral IV, peacock catheter, telemetry, pulse ox (continuous) (drain on L arm)     General Precautions: Standard, Cardiac fall   Orthopedic Precautions:N/A   Braces: N/A   Respiratory Status: Room Air  Vitals: BP (!) 138/56 (BP Location: Left arm, Patient Position: Lying)   Pulse 102   Temp 97.5 °F (36.4 °C)    "Resp 18   Ht 6' 1" (1.854 m)   Wt (!) 148 kg (326 lb 4.5 oz)   SpO2 97%   BMI 43.05 kg/m²      Exam:  Cognition:   Oriented X 4 and Alert  Patient is oriented to Person, Place, Time, Situation  Command following: Follows multistep verbal commands  Fluency: clear/fluent  Skin Integrity: fluid from edema  Posture: slouched posture, rounded shoulders, and forward head  Physical Exam:    Left UE Left LE Right UE Right LE   Edema present present present present   ROM AROM WFL AROM WFL AROM WFL AROM WFL   Strength adequate ROM, adequate strength Grossly 5/5  Limited into hip flx due to edema adequate ROM, adequate strength Grossly 5/5  Limited into hip flx due to edema   Sensation intact to light touch intact to light touch intact to light touch intact to light touch   Coordination not tested not tested not tested not tested     Outcome Measures:  AM-PAC 6 CLICK MOBILITY  Turning over in bed (including adjusting bedclothes, sheets and blankets)?: 2  Sitting down on and standing up from a chair with arms (e.g., wheelchair, bedside commode, etc.): 3  Moving from lying on back to sitting on the side of the bed?: 2  Moving to and from a bed to a chair (including a wheelchair)?: 2  Need to walk in hospital room?: 1  Climbing 3-5 steps with a railing?: 1  Basic Mobility Total Score: 11     Functional Mobility:    Bed Mobility:   Scooting to HOB via supine bridge: total assistance and 2 persons   Supine to Sit: total assistance and 2 persons for LE management and for trunk management; to R side of bed  Scooting anteriorly to EOB to have both feet planted on floor: maximal assistance and 2 persons   Sit to Supine: total assistance and 2 persons for LE management and for trunk management; to R side of bed    Sitting Balance at Edge of Bed:  Static Sitting Balance: Good- : able to accept minimal resistance  Dynamic Sitting Balance: Fair : minimal weight shifting ipsilaterally or anteriorly. Difficulty crossing " midline  Assistance Level Required: Minimal Assistance progressing to Stand By Assist  Time: ~8 min  Comments: Pt progressed to good sitting balance once hips positioned and feet planted on floor.    Transfers:   Sit <> Stand Transfer: minimum assistance and of 2 persons with hand-held assist. [id number] from EOB    Standing Balance:  Static Standing Balance: Fair- : requires minimal assistance or UE support in order to stand without LOB  Dynamic Standing Balance: Poor+ : able to stand with minimal assistance and reach ipsilaterally. Unable to weight shift.  Assistance Level Required: Moderate Assistance  Patient used: hand-held assist       Gait:   Patient ambulated: 3 steps to HOB   Patient required: moderate assistance and of 2 persons  Patient used:  hand-held assist   Gait Pattern observed: step-to  Gait Deviation(s): unsteady gait, wide base of support, decreased weight shift, decreased foot clearance, flexed posture, decreased osmar, and shuffle gait  Impairments due to: impaired balance, decreased strength, and decreased endurance  all lines remained intact throughout ambulation trial  Comments: Patient required cues for sequencing, step through gait pattern, and upright posture to increase independence and safety. Patient required cues ~ 50% of the time.     Education:  Time provided for education, counseling and discussion of health disposition in regards to patient's current status  All questions answered within PT scope of practice and to patient's satisfaction  PT role in POC to address current functional deficits  Pt educated on proper body mechanics, safety techniques, and energy conservation with PT facilitation and cueing throughout session  Whiteboard updated with therapist name and pt's current mobility status documented above    Patient left HOB elevated with all lines intact, call button in reach, and RN notified.    Time Tracking:     PT Received On: 11/27/24  PT Start Time: 0924     PT Stop  Time: 0957  PT Total Time (min): 33 min     Billable Minutes: Re-eval 6 min, Therapeutic Activity 14 min, and Neuromuscular Re-education 13 min    11/27/2024

## 2024-11-27 NOTE — PROGRESS NOTES
[facility]  Infectious Disease  Progress Note     Patient Name: Juan Carlos Yoo Sr.  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 Blood cultures from admission with B. Diminuta, micrococcus luteus, lysinobacillus species. Seen by ID previously who recommended stopping antibiotics due to concern these were contaminants. Repeat blood cultures have been no growth. Has since been transferred to ICU with increased pressor requirement, now off vaso. Blood cultures repeated on 11/24 are no growth x 24 hours. Repeat abdominal ultrasound with small ascites only.      Recommendations  - can continue vancomycin goal trough 15-20 mcg/ml until 11/24 blood cultures no growth x 48 hours  - continue pip-tazo 4.5 q8 hours for B. Diminuta x 14 days, end 12/8/24     Above discussed with primary team.      Critical care time: 40 minutes  I personally spent critical care time on the following: evaluating this patient's organ dysfunction, development of treatment plan, discussing treatment plan with patient or surrogate and bedside caregivers, discussions with critical care service and/or consultants, evaluation of patient's response to treatment, physical examination of patient, ordering and review of treatments interventions, laboratory studies, and radiographic studies, re-evaluation of patient's condition. This critical care time did not overlap with that of any other provider of the same specialty or involve time for procedures. This patient has decreasing pressor requirements, worsening renal dysfunction requiring renal  replacement therapy related to their infection, has worsening hepatic end organ damage related to their infection.They continue to be critically ill.         Anticipated Disposition: TBD     Thank you for your consult. I will sign off. Please contact us if you have any additional questions.     Johana Mendes MD  Infectious Disease  [facility] - Medical ICU     Subjective:      Principal Problem:Decompensated cirrhosis     HPI: 69M with decompensated liver cirrhosis 2/2 ETOH, HCC s/p y90, Afib on eliquis - admitted 11/20/24 for diffuse ansarca and JAE, c/f hepapto-renal syndrome. ID consulted for discordant positive bld cxs.      For background, He was recently hospitalized 1 month ago at [facility] for volume overload in the setting of decompensated cirrhosis. He was treated with IV diuresis and discharged with adjustment to his diuretics, currently on lasix 60mg BID and metolazone 2.5mg every other week as per family. Patient reports diffuse swelling of his upper and lower extremities in addition to distended abdomen and worsening dyspnea, which he believes is due to recent medication adjustment. Family states he is non-compliant with low sodium diet and fluid restriction. Family states he has been compliant with diuretics, but hasn't taken eliquis for 3 days due to issue getting refill. Pt claims to be compliant with medications, but is a poor historian. He follows with hepatology, not currently active on transplant list. He states he hasn't consumed alcohol for at least 9 months. Has c/o chills, but denies fever, cough, nausea, vomiting, chest pain, dysuria, hematuria, blood in stool.  Pt c/o worsening diffuse swelling Rt arm with pain and erythema after scratching on door fram few days ago. RUE U/S neg for DVT. Pt AF, VSS, HDS, wbc 15 uptrend. Index bld cxs 11/20 with discordant gram stains, 1 set with GPRs, the other with GPCs resemebling staph. MS/MRSA PCR negative, BCID negative. Suspect positive bld cxs  represent contaaminant. Repeat bld cxs sent 11/22.      Ucx +coag-neg staph; though without urinary sxs. RP-U/S neg for  anomaly, no stones or hydronephrosis. Abd U/S did not reveal significant  fluid for paracentesis; only trace ascits and small left pleural effusion as noted on chest-XR, with Rt basilar opacity. Pt with infrequent and non-productive cough.      Pt on empiric IV-vanc and ceftriaxone.               Interval History: denies any new symptoms but is bothered by persistent arm swelling.      Review of Systems   Gastrointestinal:  Negative for abdominal pain.   Musculoskeletal: Negative.    Skin:  Positive for color change and wound.      Objective:      Vital Signs (Most Recent):  Temp: 97.9 °F (36.6 °C) (11/26/24 1115)  Pulse: (!) 123 (11/26/24 1330)  Resp: 13 (11/26/24 1330)  BP: (!) 138/56 (11/25/24 1505)  SpO2: 99 % (11/26/24 1330) Vital Signs (24h Range):  Temp:  [97.6 °F (36.4 °C)-98.7 °F (37.1 °C)] 97.9 °F (36.6 °C)  Pulse:  [108-143] 123  Resp:  [10-27] 13  SpO2:  [97 %-100 %] 99 %  Arterial Line BP: (115-137)/(49-58) 132/53      Weight: (!) 148 kg (326 lb 4.5 oz)  Body mass index is 43.05 kg/m².     Estimated Creatinine Clearance: 17.7 mL/min (A) (based on SCr of 5.8 mg/dL (H)).     Physical Exam  Vitals and nursing note reviewed.   Constitutional:       Appearance: He is ill-appearing.   HENT:      Head: Normocephalic.      Mouth/Throat:      Mouth: Mucous membranes are moist.   Pulmonary:      Effort: Pulmonary effort is normal. No respiratory distress.   Abdominal:      General: There is distension.      Palpations: Abdomen is soft.      Tenderness: There is no abdominal tenderness. There is no guarding.   Musculoskeletal:         General: Swelling present.   Skin:     General: Skin is warm and dry.      Findings: Erythema present.   Neurological:      Mental Status: He is alert.   Psychiatric:         Mood and Affect: Mood normal.            Significant Labs:   Microbiology Results (last  7 days)         Procedure Component Value Units Date/Time     Blood culture x two cultures. Draw prior to antibiotics. [[id number]]  (Abnormal)  (Susceptibility) Collected: 11/20/24 1429     Order Status: Completed Specimen: Blood from Peripheral, Hand, Right Updated: 11/26/24 1056       Blood Culture, Routine Gram stain aer bottle: Gram positive cocci in clusters resembling Staph         Results called to and read back by:Crystal Navarrete RN 11/21/2024  23:02         Gram stain malik bottle: Gram positive rods         Results called to and read back by: Melissa Hernandez RN  11/25/2024         01:35         BREVUNDIMONAS SPECIES  further identified as Brevundimonas diminuta group           MICROCOCCUS LUTEUS  Organism is a probable contaminant       Narrative:       Aerobic and anaerobic     Blood culture [[id number]] Collected: 11/22/24 0903     Order Status: Completed Specimen: Blood Updated: 11/26/24 1012       Blood Culture, Routine No Growth to date         No Growth to date         No Growth to date         No Growth to date         No Growth to date     Blood culture [[id number]] Collected: 11/22/24 0904     Order Status: Completed Specimen: Blood Updated: 11/26/24 1012       Blood Culture, Routine No Growth to date         No Growth to date         No Growth to date         No Growth to date         No Growth to date     Blood culture [[id number]] Collected: 11/24/24 1804     Order Status: Completed Specimen: Blood from Peripheral, Antecubital, Left Updated: 11/25/24 2012       Blood Culture, Routine No Growth to date         No Growth to date     Blood culture [[id number]] Collected: 11/24/24 1804     Order Status: Completed Specimen: Blood from Peripheral, Antecubital, Right Updated: 11/25/24 2012       Blood Culture, Routine No Growth to date         No Growth to date     Rapid Organism ID by PCR (from Blood culture) [[id number]] Collected: 11/20/24 1429     Order Status: Completed Updated: 11/25/24 0247        Enterococcus faecalis Not Detected       Enterococcus faecium Not Detected       Listeria monocytogenes Not Detected       Staphylococcus spp. Not Detected       Staphylococcus aureus Not Detected       Staphylococcus epidermidis Not Detected       Staphylococcus lugdunensis Not Detected       Streptococcus species Not Detected       Streptococcus agalactiae Not Detected       Streptococcus pneumoniae Not Detected       Streptococcus pyogenes Not Detected       Acinetobacter calcoaceticus/baumannii complex Not Detected       Bacteroides fragilis Not Detected       Enterobacterales Not Detected       Enterobacter cloacae complex Not Detected       Escherichia coli Not Detected       Klebsiella aerogenes Not Detected       Klebsiella oxytoca Not Detected       Klebsiella pneumoniae group Not Detected       Proteus Not Detected       Salmonella sp Not Detected       Serratia marcescens Not Detected       Haemophilus influenzae Not Detected       Neisseria meningtidis Not Detected       Pseudomonas aeruginosa Not Detected       Stenotrophomonas maltophilia Not Detected       Candida albicans Not Detected       Candida auris Not Detected       Candida glabrata Not Detected       Candida krusei Not Detected       Candida parapsilosis Not Detected       Candida tropicalis Not Detected       Cryptococcus neoformans/gattii Not Detected       CTX-M (ESBL ) Test Not Applicable       IMP (Carbapenem resistant) Test Not Applicable       KPC resistance gene (Carbapenem resistant) Test Not Applicable       mcr-1  Test Not Applicable       mec A/C  Test Not Applicable       mec A/C and MREJ (MRSA) gene Test Not Applicable       NDM (Carbapenem resistant) Test Not Applicable       OXA-48-like (Carbapenem resistant) Test Not Applicable       van A/B (VRE gene) Test Not Applicable       VIM (Carbapenem resistant) Test Not Applicable     Narrative:       Aerobic and anaerobic     Blood culture x two cultures. Draw prior to  antibiotics. [[id number]]  (Abnormal) Collected: 11/20/24 1444     Order Status: Completed Specimen: Blood from Peripheral, Wrist, Left Updated: 11/23/24 1000       Blood Culture, Routine Gram stain aer bottle: Gram positive rods         Results called to and read back by:Crystal Navarrete RN 11/21/2024  21:58         GRAM-POSITIVE JANE  further identified as Lysinobacillus species       Narrative:       Aerobic and anaerobic     Urine culture [[id number]]  (Abnormal) Collected: 11/20/24 1801     Order Status: Completed Specimen: Urine Updated: 11/22/24 0301       Urine Culture, Routine COAGULASE-NEGATIVE STAPHYLOCOCCUS SPECIES  10,000 - 49,999 cfu/ml  Susceptibility testing not routinely performed.       Narrative:       Specimen Source->Urine     MRSA/SA Rapid ID by PCR from Blood culture [[id number]] Collected: 11/20/24 1429     Order Status: Completed Updated: 11/22/24 0010       Staph aureus ID by PCR Negative       Methicillin Resistant ID by PCR Negative     Narrative:       Aerobic and anaerobic     Rapid Organism ID by PCR (from Blood culture) [[id number]] Collected: 11/20/24 1444     Order Status: Completed Updated: 11/21/24 2202       Enterococcus faecalis Not Detected       Enterococcus faecium Not Detected       Listeria monocytogenes Not Detected       Staphylococcus spp. Not Detected       Staphylococcus aureus Not Detected       Staphylococcus epidermidis Not Detected       Staphylococcus lugdunensis Not Detected       Streptococcus species Not Detected       Streptococcus agalactiae Not Detected       Streptococcus pneumoniae Not Detected       Streptococcus pyogenes Not Detected       Acinetobacter calcoaceticus/baumannii complex Not Detected       Bacteroides fragilis Not Detected       Enterobacterales Not Detected       Enterobacter cloacae complex Not Detected       Escherichia coli Not Detected       Klebsiella aerogenes Not Detected       Klebsiella oxytoca Not Detected       Klebsiella  pneumoniae group Not Detected       Proteus Not Detected       Salmonella sp Not Detected       Serratia marcescens Not Detected       Haemophilus influenzae Not Detected       Neisseria meningtidis Not Detected       Pseudomonas aeruginosa Not Detected       Stenotrophomonas maltophilia Not Detected       Candida albicans Not Detected       Candida auris Not Detected       Candida glabrata Not Detected       Candida krusei Not Detected       Candida parapsilosis Not Detected       Candida tropicalis Not Detected       Cryptococcus neoformans/gattii Not Detected       CTX-M (ESBL ) Test Not Applicable       IMP (Carbapenem resistant) Test Not Applicable       KPC resistance gene (Carbapenem resistant) Test Not Applicable       mcr-1  Test Not Applicable       mec A/C  Test Not Applicable       mec A/C and MREJ (MRSA) gene Test Not Applicable       NDM (Carbapenem resistant) Test Not Applicable       OXA-48-like (Carbapenem resistant) Test Not Applicable       van A/B (VRE gene) Test Not Applicable       VIM (Carbapenem resistant) Test Not Applicable     Narrative:       Aerobic and anaerobic     (rule out SBP) Gram stain [[id number]]       Order Status: No result Specimen: Ascites       (rule out SBP) Aerobic culture [[id number]]       Order Status: No result Specimen: Ascites       (rule out SBP) Culture, Anaerobic [[id number]]       Order Status: No result Specimen: Ascites       Clostridium difficile EIA [[id number]]       Order Status: Canceled Specimen: Stool       Stool culture [[id number]]       Order Status: No result Specimen: Stool                  Significant Imaging: I have reviewed all pertinent imaging results/findings within the past 24 hours.

## 2024-11-27 NOTE — PROGRESS NOTES
11/27/24 0337   Treatment   Treatment Type SLED   Treatment Status Discontinued treatment   Dialysis Machine Number [id number]   Dialyzer Time (hours) 10   BVP (Liters) 117.8 L   Solutions Labeled and Current  Yes   Access Temporary Cath   Catheter Dressing Intact  Yes   Alarms Engaged Yes   CRRT Comments sled tx completed   CRRT Hourly Documentation   Blood Flow (mL/min) 150   UF Rate 400 cc/hr   Arterial Pressure (mmHg) 50 mmHg   Venous Pressure (mmHg) 150 mmHg   Effluent Pressure (EP) (mmHg) 50 mmHg   Total UF (Hourly Cleared) (mL) 74     Sled tx completed, deaccessed, saline locked, report given to primary rn

## 2024-11-27 NOTE — ASSESSMENT & PLAN NOTE
JAE is likely due to pre-renal azotemia due to intravascular volume depletion secondary to decompensated hepatic cirrhosis with volume overload and acute tubular necrosis caused by hemodynamic instability, renal hypoperfusion, severe sepsis with GNR bacteremia. Initial presentation concerning for hepatorenal syndrome, transferred to MICU for vasopressors without success in reaching MAP goal 85-90 for treatment of HRS. Currently, patient with oligouric JAE more contributed by ATN as above.     Hx of CKD3a with baseline creatinine is  1.2-1.4 . Most recent creatinine and eGFR are listed below.  Recent Labs     11/26/24  1347 11/26/24  2325 11/27/24  0209   CREATININE 5.8*  5.8* 3.4* 2.6*  2.6*   EGFRNORACEVR 9.8*  9.8* 18.5* 25.6*  25.6*       Maintain MAP goals > 80-85 mmHg if able, continue vasopressors for blood pressure support  Plan for SCUF/SLED split by 6/4 hours, total 10 hours with UF goal 350-400 mL/hour for removal of uremic toxins and volume control  Recommend further goals of care discussion regarding current prognosis and liver transplant candidacy   Will obtain urine sodium and collect sample for repeat urine microscopy if able    - Avoid nephrotoxins and renally dose meds for GFR listed above  - Monitor urine output, serial BMP, and adjust therapy as needed  - Retroperitoneal ultrasound c/w bilateral CKD, showed questionable 6 mm nonobstructing stone in the left upper pole without evidence of hydronephrosis or solid renal mass.  - Hemodialysis consent obtained & placed in patient chart  - S/p RIJ trialysis line placement 11/24  - Most recent session CRRT overnight on 11/26

## 2024-11-27 NOTE — PROGRESS NOTES
Pharmacokinetic Sign-off: IV Vancomycin    Therapy with vancomycin complete and/or consult discontinued by provider.  Pharmacy will sign off, please re-consult as needed.    Iliana Marin, PharmD, BCPS  EXT [phone]

## 2024-11-27 NOTE — PROGRESS NOTES
[facility]  Nephrology  Progress Note    Patient Name: Juan Carlos Yoo Sr.  MRN: [id number]  Admission Date: 11/20/2024  Hospital Length of Stay: 7 days  Attending Provider: Saad Rahman MD   Primary Care Physician: Nyla Rios FNP  Principal Problem:Decompensated cirrhosis    Subjective:     HPI: Juan Carlos Yoo is a 69 year old male with hx of decompensated hepatic cirrhosis due to alcohol use, HCC s/p Y90, esophageal varices, persistent afib on eliquis, HTN, CKD3a (baseline creatinine 1.2-1.4) admitted on 11/20 for sepsis likely 2/2 SSTI involving RUE and decompensated hepatic cirrhosis with signs of volume overload. Recent admission 10/4 at [facility] for decompensated hepatic cirrhosis where he was discharged with oral diuretic regimen including furosemide 60 mg BID and metolazone 2.5 mg daily, low salt diet with fluid restriction. It is unclear if patient is adherent to these medications or lifestyle modifications. W/u notable for anemia with hb 6.7 s/p 1 unit pRBC 11/20 and JAE on CKD w elevated BUN 49/creatinine 5.9, hyperkalemia (K 6.1>5.1 after shifting), bicarb 18, elevated lactate up to 3.5. Nephrology consulted for JAE with c/o HRS    Interval History: NAEON. Tolerated SLED tx overnight, total UF ~4L. Patient states he is tired this morning and would like to rest. Boo catheter replaced yesterday with approximately 205cc urinary output charted in the past 24 hours. MAP goal decreased per primary, maintained >75mmHg. Currently on levophed 0.08, titrated off vasopressin.     Review of patient's allergies indicates:  No Known Allergies  Current Facility-Administered Medications   Medication Frequency    0.9%  NaCl infusion (CRRT USE ONLY) Continuous    0.9%  NaCl infusion (for blood administration) Q24H PRN    albuterol-ipratropium 2.5 mg-0.5 mg/3 mL nebulizer solution 3 mL Q6H PRN    aluminum-magnesium hydroxide-simethicone 200-200-20 mg/5 mL suspension 30 mL QID PRN    ascorbic acid (vitamin  C) tablet 500 mg Daily    cyanocobalamin tablet 250 mcg Daily    dextrose 10% bolus 125 mL 125 mL PRN    dextrose 10% bolus 250 mL 250 mL PRN    fludrocortisone tablet 100 mcg Daily    glucagon (human recombinant) injection 1 mg PRN    glucose chewable tablet 16 g PRN    glucose chewable tablet 24 g PRN    hydrocortisone sodium succinate injection 100 mg Q8H    lactulose 20 gram/30 mL solution Soln 45 g Q6H PRN    magnesium sulfate 2g in water 50mL IVPB (premix) PRN    melatonin tablet 6 mg Nightly PRN    midodrine tablet 15 mg TID    naloxone 0.4 mg/mL injection 0.02 mg PRN    NORepinephrine bitartrate-NaCl 32 mg/250 mL (128 mcg/mL) infusion Continuous    ondansetron injection 4 mg Q8H PRN    pantoprazole EC tablet 40 mg Daily    piperacillin-tazobactam (ZOSYN) 4.5 g in D5W 100 mL IVPB (MB+) Q8H    simethicone chewable tablet 80 mg QID PRN    sodium chloride 0.9% flush 10 mL Q12H PRN    sodium chloride 0.9% flush 10 mL PRN    sodium phosphate 20.01 mmol in D5W 250 mL IVPB PRN    sodium phosphate 30 mmol in D5W 250 mL IVPB PRN    sodium phosphate 39.99 mmol in D5W 250 mL IVPB PRN    tamsulosin 24 hr capsule 0.4 mg QHS    vancomycin - pharmacy to dose pharmacy to manage frequency    vasopressin (PITRESSIN) 0.2 Units/mL in 0.9% NaCl 100 mL infusion Continuous       Objective:     Vital Signs (Most Recent):  Temp: 98.1 °F (36.7 °C) (11/27/24 0701)  Pulse: 99 (11/27/24 0801)  Resp: 10 (11/27/24 0801)  BP: (!) 138/56 (11/25/24 1505)  SpO2: 95 % (11/27/24 0801) Vital Signs (24h Range):  Temp:  [97.6 °F (36.4 °C)-98.1 °F (36.7 °C)] 98.1 °F (36.7 °C)  Pulse:  [] 99  Resp:  [10-20] 10  SpO2:  [95 %-100 %] 95 %  Arterial Line BP: (108-136)/(40-61) 129/52     Weight: (!) 148 kg (326 lb 4.5 oz) (11/22/24 0400)  Body mass index is 43.05 kg/m².  Body surface area is 2.76 meters squared.    I/O last 3 completed shifts:  In: 3397.3 [P.O.:486; I.V.:2561; IV Piggyback:350.3]  Out: 3990 [Urine:205; Other:3785]     Physical  Exam  Vitals and nursing note reviewed.   Constitutional:       General: He is awake. He is not in acute distress.     Appearance: He is obese. He is ill-appearing. He is not toxic-appearing.   HENT:      Head: Normocephalic and atraumatic.   Eyes:      General: No scleral icterus.     Extraocular Movements: Extraocular movements intact.      Conjunctiva/sclera: Conjunctivae normal.   Cardiovascular:      Rate and Rhythm: Tachycardia present. Rhythm irregular.   Pulmonary:      Effort: Pulmonary effort is normal. No respiratory distress.   Abdominal:      General: There is distension.      Palpations: Abdomen is soft.      Tenderness: There is no abdominal tenderness. There is no guarding or rebound.   Musculoskeletal:         General: No swelling or tenderness.      Right lower leg: Edema present.      Left lower leg: Edema present.      Comments: 2+ pitting edema in bilateral lower extremities   Skin:     General: Skin is warm.      Coloration: Skin is not jaundiced.      Findings: Bruising present.   Neurological:      Mental Status: He is alert and oriented to person, place, and time.      Motor: No weakness.   Psychiatric:         Behavior: Behavior is slowed.        Significant Labs:  CBC:   Recent Labs   Lab 11/27/24  0209   WBC 6.56   RBC 3.04*   HGB 7.5*   HCT 23.4*   PLT 30*   MCV 77*   MCH 24.7*   MCHC 32.1     CMP:   Recent Labs   Lab 11/27/24  0209   *  129*   CALCIUM 8.5*  8.4*   ALBUMIN 3.2*  3.2*   PROT 6.8   *  133*   K 4.0  4.0   CO2 20*  20*     104   BUN 18  18   CREATININE 2.6*  2.6*   ALKPHOS 44   ALT 10   AST 13   BILITOT 3.4*     All labs within the past 24 hours have been reviewed.     Significant Imaging:  All imaging within the past 24 hours have been reviewed.   Assessment/Plan:     Renal/  Stage 3a chronic kidney disease  Creatinine stable for now. BMP reviewed- noted Estimated Creatinine Clearance: 39.5 mL/min (A) (based on SCr of 2.6 mg/dL (H)). according  to latest data. Based on current GFR, CKD stage is stage 3 - GFR 30-59.  Monitor UOP and serial BMP and adjust therapy as needed. Renally dose meds. Avoid nephrotoxic medications and procedures. See JAE (acute kidney injury).      JAE (acute kidney injury)  JAE is likely due to pre-renal azotemia due to intravascular volume depletion secondary to decompensated hepatic cirrhosis with volume overload and acute tubular necrosis caused by hemodynamic instability, renal hypoperfusion, severe sepsis with GNR bacteremia. Initial presentation concerning for hepatorenal syndrome, transferred to MICU for vasopressors without success in reaching MAP goal 85-90 for treatment of HRS. Currently, patient with oligouric JAE more contributed by ATN as above.     Hx of CKD3a with baseline creatinine is  1.2-1.4 . Most recent creatinine and eGFR are listed below.  Recent Labs     11/26/24  1347 11/26/24  2325 11/27/24  0209   CREATININE 5.8*  5.8* 3.4* 2.6*  2.6*   EGFRNORACEVR 9.8*  9.8* 18.5* 25.6*  25.6*       Maintain MAP goals > 80-85 mmHg if able, continue vasopressors for blood pressure support  Plan for SCUF/SLED split by 6/4 hours, total 10 hours with UF goal 350-400 mL/hour for removal of uremic toxins and volume control  Recommend further goals of care discussion regarding current prognosis and liver transplant candidacy   Will obtain urine sodium and collect sample for repeat urine microscopy if able    - Avoid nephrotoxins and renally dose meds for GFR listed above  - Monitor urine output, serial BMP, and adjust therapy as needed  - Retroperitoneal ultrasound c/w bilateral CKD, showed questionable 6 mm nonobstructing stone in the left upper pole without evidence of hydronephrosis or solid renal mass.  - Hemodialysis consent obtained & placed in patient chart  - S/p RIJ trialysis line placement 11/24  - Most recent session CRRT overnight on 11/26      Thank you for your consult. We will follow-up with patient. Please  contact us if you have any additional questions.    Margarita Paredes MD  Nephrology  [facility] - Medical ICU

## 2024-11-27 NOTE — PROGRESS NOTES
[facility] - Medical [facility]  Critical Care Medicine  Progress Note    Patient Name: Juan Carlos Yoo Sr.  MRN: [id number]  Admission Date: 11/20/2024  Hospital Length of Stay: 7 days  Code Status: Full Code  Attending Provider: Saad Rahman MD  Primary Care Provider: Nyla Rios FNP   Principal Problem: Decompensated cirrhosis    Subjective:     HPI:  Juan Carlos Yoo is a 69 male with PMH of alcoholic cirrhosis, esophageal varices, Afib on eliquis, and HTN who presents for c/o worsening diffuse swelling as well as R arm pain/erythema. He was recently hospitalized 1 month ago at [facility] for volume overload in the setting of decompensated cirrhosis. He was treated with IV diuresis and discharged with adjustment to his diuretics, currently on lasix 60mg BID and metolazone 2.5mg every other week as per family. Patient reports diffuse swelling of his upper and lower extremities in addition to distended abdomen and worsening dyspnea, which he believes is due to recent medication adjustment. Family states he is non-compliant with low sodium diet and fluid restriction. Family states he has been compliant with diuretics, but rom't taken eliquis for 3 days due to issue getting refill. He also reports scratching right arm on door frame 3-4 days ago which has become red and painful after wrapping in in bandage. He claims to be compliant with medications, but is a poor historian. He follows with hepatology, not currently active on transplant list. He states he hasn't consumed alcohol for at least 9 months. Has c/o chills, but denies fever, cough, nausea, vomiting, chest pain, dysuria, hematuria, blood in stool. Workup in ED remarkable for VS: T 97.6 HR 94 RR 22 BP 95/56 O2 sat 97% on RA. Hb 6.7, MCV 75, Plt 81, PT/INR 22.6/2.2, Na 128, K 6.1, BUN/Cr 49/5.7, Alb 2.8, AST/ALT 35/9, Ammonia 104, , Trop neg, LA 2.9, CXR: Cardiac size is enlarged similar to prior.  No large volume of pleural fluid noted although there is mild  blunting of the right costophrenic angle which may relate to a small amount of pleural fluid.  Suspected right basilar opacity, to be correlated clinically for infection. RUE venous doppler: No thrombus in central veins of the right upper extremity. Patient received vanc/zosyn and lasix 80mg IVP in ED and will be admitted to hospital medicine service for further management.     Pt was admitted to hospital medicine. Blood cultures positive for Gram-positive cocci and Gram-negative rods. ID has been consulted. S/p 2 units PRBC for Hemoglobin dropped 6.8 on 11/21. Pt was on Eliquis for atrial fibrillation prior to admission which was held.  Hepatology following patient's clinical course, but are not evaluating him for transplant at this time. Diuretics were held because of concern for HRS.  Patient was started on albumin and midodrine per Nephrology recommendations for HRS.  Renal function continued to worsen and recommendation per Nephrology to transfer to ICU for maintaining goal map over 85 on Levophed.  ICU was notified and patient to be assessed to be transferred to ICU.       Hospital/ICU Course:  69 y.o. male with history of EtOH use disorder, EtOH cirrhosis, HCC s/p y90, morbid obesity BMI 44, HTN, and Afib who presents with severe anasarca and JAE.      Appreciate hepatology and nephrology recommendations.  Diuretics were held because of concern for HRS.  Patient was started on albumin and midodrine per Nephrology recommendations for HRS.  Renal function continued to worsen and recommendation per Nephrology to transfer to ICU for maintaining goal map over 85 on Levophed.  ICU was notified and patient to be assessed to be transferred to ICU.     Patient also has Gram-positive cocci and Gram-negative rods in his blood now.  Possible sources could be got translocation and barrier related infection through skin as he has been significantly edematous and has been oozing.  Has been on vancomycin and Rocephin.  Id  was consulted this morning.  Repeat blood cultures were obtained.     Continues to have anemia.  Hemoglobin dropped on 11/20 and was given 1 unit of packed red blood cells.  Did not respond appropriately.  Hemoglobin dropped again to 6.8 on 11/21 and was given 1 unit of packed red blood cells.  Hemoglobin again on 11/22 is 7.2.  No reported melena or hematochezia.  Patient was on Eliquis for atrial fibrillation prior to admission which was held.  Given history of esophageal varices and portal hypertensive gastropathy whereas also could be component of slow bleeding, under production due to CKD and hemolysis while also with decompensated cirrhosis.  Started on Protonix IV b.i.d. and octreotide.     Also clarified with hepatology.  Given patient's current infection we will await ID recommendations however we will be challenging to consider transplant evaluation at this time.  Trend clinical course     Goal clarification with the patient and family.  Patient at this time wants to be full code and continue with Levophed in ICU.  They would consider discussion with palliative care in a day or so if things do not get better.     In MICU patient started on Levophed, however titration remained difficult given tachycardic response from the patient.     11/24 Trialysis and arterial lines placed overnight and currently on levo and norepi. SLED today.    11/25 Boo dc. Increased midodrine. Continue steroids until d/c pressors. Discussed with Nephrology about our concern for sustained use of pressors to achieve their MAP goals of 85. Will follow up tomorrow.     11/26 Platelets 48. Repeat 38 confirming thrombocytopenia. Concern for HIT. D/c heparin. Increased lactulose dosing. Bladder scan revealed urine in bladder. Boo placed. Off vaso. Continuing levo. Maintain MAP goals 80. PT/OT consulted.     11/27 MAP goal 75-80. Heparin antibody result pending.     Interval History/Significant Events: No events overnight    Review of  Systems  Objective:     Vital Signs (Most Recent):  Temp: 97.5 °F (36.4 °C) (11/27/24 1101)  Pulse: 99 (11/27/24 1301)  Resp: 19 (11/27/24 1301)  BP: (!) 138/56 (11/25/24 1505)  SpO2: 100 % (11/27/24 1301) Vital Signs (24h Range):  Temp:  [97.5 °F (36.4 °C)-98.1 °F (36.7 °C)] 97.5 °F (36.4 °C)  Pulse:  [] 99  Resp:  [10-21] 19  SpO2:  [95 %-100 %] 100 %  Arterial Line BP: (108-134)/(40-61) 131/53   Weight: (!) 148 kg (326 lb 4.5 oz)  Body mass index is 43.05 kg/m².      Intake/Output Summary (Last 24 hours) at 11/27/2024 1355  Last data filed at 11/27/2024 1001  Gross per 24 hour   Intake 2593.46 ml   Output 4070 ml   Net -1476.54 ml          Physical Exam     Physical Exam  Constitutional:       General: He is not in acute distress.     Appearance: He is obese. He is ill-appearing (chronic). He is not toxic-appearing.   HENT:      Head: Normocephalic and atraumatic.   Eyes:    Extraocular Movements: Extraocular movements intact.     Conjunctiva/sclera: Conjunctivae normal.   Cardiovascular:      Rate and Rhythm: Normal rate. Regular rhythm     Heart sounds: Normal heart sounds.   Pulmonary:      Effort: Pulmonary effort is normal. No respiratory distress.      Breath sounds: No wheezing.   Abdominal:      General: Bowel sounds are normal. There is distension.      Palpations: Abdomen is soft.      Tenderness: There is no abdominal tenderness.      Hernia: A hernia (umbilical and L inguinal hernia) is present.   Musculoskeletal:         General: Normal range of motion.      Right upper arm: Swelling and edema present.      Left upper arm: Swelling and edema present.      Cervical back: Normal range of motion and neck supple.      Right lower leg: Edema present.      Left lower leg: Edema present.   Skin:     General: Skin is warm and dry.      Coloration: Skin is not jaundiced.      Findings: Erythema (RUE) present.   Neurological:      General: No focal deficit present.      Mental Status: He is alert and  oriented to person, place, and time. He is confused.      Comments: [employer]   Psychiatric:         Attention and Perception: He is inattentive.         Behavior: Behavior normal.   Vents:     Lines/Drains/Airways       Central Venous Catheter Line  Duration             Trialysis (Dialysis) Catheter 11/24/24 0441 right internal jugular 3 days              Drain  Duration                  Urethral Catheter 11/26/24 1738 <1 day              Arterial Line  Duration             Arterial Line 11/24/24 0317 Right Radial 3 days              Peripheral Intravenous Line  Duration                  Peripheral IV - Single Lumen 11/27/24 1101 20 G Anterior;Left Forearm <1 day                  Significant Labs:    CBC/Anemia Profile:  Recent Labs   Lab 11/26/24  0335 11/26/24  1035 11/27/24  0209   WBC 4.87 4.59 6.56   HGB 7.8* 7.5* 7.5*   HCT 24.1* 23.3* 23.4*   PLT 48* 38* 30*   MCV 77* 77* 77*   RDW 23.3* 22.9* 23.2*        Chemistries:  Recent Labs   Lab 11/26/24  0335 11/26/24  1347 11/26/24  2325 11/27/24  0209   *  129* 131*  131* 133* 133*  133*   K 4.2  4.2 4.0  4.0 3.9 4.0  4.0     100 100  100 103 104  104   CO2 14*  15* 19*  19* 20* 20*  20*   BUN 42*  42* 42*  42* 23 18  18   CREATININE 5.5*  5.7* 5.8*  5.8* 3.4* 2.6*  2.6*   CALCIUM 9.5  9.3 9.3  9.3 8.4* 8.5*  8.4*   ALBUMIN 3.7  3.6 3.3*  3.3* 3.2* 3.2*  3.2*   PROT 7.4  --   --  6.8   BILITOT 3.1*  --   --  3.4*   ALKPHOS 49  --   --  44   ALT 10  --   --  10   AST 14  --   --  13   MG 2.2 2.3  2.3 2.1 2.0  2.1   PHOS 5.5*  5.4* 5.6*  5.6* 3.3 2.9  2.8       All pertinent labs within the past 24 hours have been reviewed.    Significant Imaging:  I have reviewed all pertinent imaging results/findings within the past 24 hours.    ABG  Recent Labs   Lab 11/20/24  1542   PH 7.412   PO2 40   PCO2 34.7*   HCO3 22.1*   BE -2     Assessment/Plan:     Neuro  Encephalopathy, metabolic  AAOx4  Will assess for signs of encephalopathy  "    Cardiac/Vascular  Atrial fibrillation  Holding home Eliquis in the setting of recent low blood counts    Renal/  Stage 3a chronic kidney disease  --Nephrology following  --Strict intake and output  --Renally dose all medications  --Avoid nephrotoxic agents    JAE (acute kidney injury)  Baseline creatinine is 1.5    Cr trend 6.3 > 6.4     Plan  - JAE is worsening. Will continue current treatment  - Avoid nephrotoxins and renally dose meds for GFR listed above  - Monitor urine output, serial BMP, and adjust therapy as needed     - Etiology includes volume overload, obstruction, hypotension, possible HRS  - monitor peacock catheter and monitor strict I&O  - was started on midodrine and albumin.  Octreotide started 11/22/2024.  -appreciate nephrology recommendations and recommendation to give 1 dose of IV Lasix 120 mg.   - ICU consulted for Levo administration with MAP goal > 80 . Peripheral Levophed ordered, titration difficult due to tachycardic response, will continue attempting to meet MAP goal however this may be difficult given the patient has Afib.  - Stress dosing with Fludrocortisone and Hydrocortisone as HRS patients tend to have AI-- 11/27 d/c steroids      ID  Severe sepsis  This patient does have evidence of infective focus  My overall impression is sepsis.  Source: Abdominal  Antibiotics given-   Antibiotics (72h ago, onward)      Start     Stop Route Frequency Ordered    11/26/24 2300  piperacillin-tazobactam (ZOSYN) 4.5 g in D5W 100 mL IVPB (MB+)         -- IV Every 8 hours (non-standard times) 11/26/24 1522          Latest lactate reviewed-  No results for input(s): "LACTATE", "POCLAC" in the last 72 hours.    Organ dysfunction indicated by Acute kidney injury    Fluid challenge Contraindicated- Fluid bolus is contraindicated in this patient due to End Stage Liver Disease     Post- resuscitation assessment No - Post resuscitation assessment not needed     Source control achieved by: " antibiotics    Hematology  Thrombocytopenia  --Daily CBC  --Transfuse for PLT<10k, or PLT<50K with active bleeding  --Monitor for signs and symptoms of bleeding    Coagulopathy  End Stage Liver Disease precipitated coagulopathy  -heparin d/c d/t thrombocytopenia    Endocrine  Hyponatremia  Likely dilutional secondary to end stage liver disease and HRS    Will monitor for S&S of hyponatremia and correct when clinically appropriate.     GI  * Decompensated cirrhosis  MELD 3.0: 33 at 11/25/2024  2:45 AM  MELD-Na: 33 at 11/25/2024  2:45 AM  Calculated from:  Serum Creatinine: 3.9 mg/dL (Using max of 3 mg/dL) at 11/25/2024  2:45 AM  Serum Sodium: 129 mmol/L at 11/25/2024  2:45 AM  Total Bilirubin: 4.1 mg/dL at 11/25/2024  2:45 AM  Serum Albumin: 4 g/dL (Using max of 3.5 g/dL) at 11/25/2024  2:45 AM  INR(ratio): 1.7 at 11/23/2024  3:58 AM  Age at listing (hypothetical): 69 years  Sex: Male at 11/25/2024  2:45 AM    --Hepatology following, appreciate [facility]            Critical Care Daily Checklist:     A: Awake: RASS Goal/Actual Goal:    Actual:     B: Spontaneous Breathing Trial Performed?     C: SAT & SBT Coordinated?  N/A                      D: Delirium: CAM-ICU     E: Early Mobility Performed? No   F: Feeding Goal:    Status:               Current Diet Order   Procedures    Diet Renal Fluid - 1500mL       Order Specific Question:   Fluid restriction:       Answer:   Fluid - 1500mL      AS: Analgesia/Sedation None    T: Thromboembolic Prophylaxis None (heparin d/c)   H: HOB > 300 Yes   U: Stress Ulcer Prophylaxis (if needed) Pantoprazole 40 IV   G: Glucose Control stable   B: Bowel Function Will titrate lactulose for greater occurence   I: Indwelling Catheter (Lines & Boo) Necessity Peripheral IV, trialysis cath, A line   D: De-escalation of Antimicrobials/Pharmacotherapies Zosyn, levo     Plan for the day/ETD Titrate to MAP >75-80     Code Status:  Family/Goals of Care: Full Code  Will discuss GOC if MAP goal deemed  unattainable       Critical secondary to hepatorenal syndrome      Critical care was time spent personally by me on the following activities: development of treatment plan with patient or surrogate and bedside caregivers, discussions with consultants, evaluation of patient's response to treatment, examination of patient, ordering and performing treatments and interventions, ordering and review of laboratory studies, ordering and review of radiographic studies, pulse oximetry, re-evaluation of patient's condition. This critical care time did not overlap with that of any other provider or involve time for any procedures.     Livier Macario MD  Critical Care Medicine  [facility]

## 2024-11-27 NOTE — PLAN OF CARE
MICU DAILY GOALS     Family/Goals of care/Code Status   Code Status: Full Code    24H Vital Sign Range  Temp:  [97.5 °F (36.4 °C)-98.1 °F (36.7 °C)]   Pulse:  []   Resp:  [10-28]   BP: (100-107)/(55-56)   SpO2:  [95 %-100 %]   Arterial Line BP: (106-134)/(40-69)      Shift Events (include procedures and significant events)  Visited by family. Platelets ordered and held by MD. HIT panel negative. Not a candidate for liver transplant. Levo stopped. Plans for 4 hr SLED and 6 hr SCUF tonight.     AWAKE RASS: Goal - RASS Goal: 0-->alert and calm  Actual - RASS (Pedraza Agitation-Sedation Scale): alert and calm    Restraint necessity: Not necessary   BREATHE SBT: Not intubated    Coordinate A & B, analgesics/sedatives Pain: managed   SAT: Not intubated   Delirium CAM-ICU: Overall CAM-ICU: Negative   Early(intubated/ Progressive (non-intubated) Mobility MOVE Screen (INTUBATED ONLY): Not intubated    Activity: Activity Management: Rolling - L1, Arm raise - L1   Feeding/Nutrition Diet order: Diet/Nutrition Received: regular, Specialty Diet/Nutrition Received: renal diet   Thrombus DVT prophylaxis: VTE Core Measure: Pharmacological prophylaxis initiated/maintained   HOB Elevation Head of Bed (HOB) Positioning: HOB at 30 degrees   Ulcer Prophylaxis GI: yes   Glucose control managed Glycemic Management: blood glucose monitored   Skin Skin assessment:     Sacrum intact/not altered? Yes  Heels intact/not altered? Yes  Surgical wound? No    CHECK ONE!   (no altered skin or altered skin) and sub boxes:  [] No Altered Skin Integrity Present    []Prevention Measures Documented    [x] Altered Skin Integrity Present or Discovered   [x] LDA present in EPIC, daily doc completed              [] LDA added if not in EPIC (describe wound).                    When describing wound, do not stage, use descriptive words only.    [] Wound Image Taken (required on admit,                   transfer/discharge and every Tuesday)    Wound Care  Consulted? No   Bowel Function no issues    Indwelling Catheter Necessity      Urethral Catheter 11/26/24 1738-Reason for Continuing Urinary Catheterization: Critically ill in ICU and requiring hourly monitoring of intake/output  [REMOVED]      Urethral Catheter 11/20/24 1802 Double-lumen-Reason for Continuing Urinary Catheterization: Urinary retention    Trialysis (Dialysis) Catheter 11/24/24 0441 right internal jugular-Line Necessity Review: CRRT/HD  Yes   De-escalation Antibiotics Yes        VS and assessment per flow sheet, patient progressing towards goals as tolerated, plan of care reviewed with [unfilled] and family, all concerns addressed.

## 2024-11-27 NOTE — PT/OT/SLP RE-EVAL
Occupational Therapy   Re-evaluation/Treatment    Name: Juan Carlos Yoo Sr.  MRN: [id number]  Admitting Diagnosis:  Decompensated cirrhosis  Recent Surgery: * No surgery found *      Recommendations:     Discharge Recommendations: Moderate Intensity Therapy  Discharge Equipment Recommendations:  (to be determined)  Barriers to discharge:  Decreased caregiver support    Assessment:     Juan Carlos Yoo Sr. is a 69 y.o. male with a medical diagnosis of Decompensated cirrhosis. Performance deficits affecting function are weakness, impaired endurance, impaired self care skills, impaired functional mobility, gait instability, impaired balance, impaired cardiopulmonary response to activity, edema. Patient cleared for therapy and agreed to EOB activity. Patient noted to hold breath when mobilizing to EOB and oxygen saturation dropped to mid 80's, but recovered with cues with good breathing technique and rest break. Patient BP/MAP throughout session EOB mid 60's to low 70's and was able to tolerate standing and sidestepping to HOB. Patient would benefit from continued skilled acute OT 4x/wk to improve functional mobility, increase independence with ADLs, and address established goals. Recommending moderate intensity therapy once medically appropriate for discharge to increase maximal independence, reduce burden of care, and ensure safety.     Rehab Prognosis:  good; patient would benefit from acute skilled OT services to address these deficits and reach maximum level of function.       Plan:     Patient to be seen 4 x/week to address the above listed problems via self-care/home management, therapeutic activities, therapeutic exercises, neuromuscular re-education  Plan of Care Expires: 12/27/24  Plan of Care Reviewed with: patient    Subjective     Chief Complaint: swelling  Patient/Family stated goals: patient agreed to therapy  Communicated with: NSG prior to session.  Pain/Comfort:  Pain Rating 1: 0/10  Pain Rating  Post-Intervention 1: 0/10    Objective:     Communicated with: JESUS prior to session.  Patient found HOB elevated with: Trialysis, arterial line, peripheral IV, peacock catheter, telemetry, pulse ox (continuous) (drain on L arm) upon OT entry to room.    General Precautions: Standard, fall  Orthopedic Precautions: N/A  Braces: N/A  Respiratory Status: Room air    Occupational Performance:    Bed Mobility:    Patient completed Scooting/Bridging with maximal assistance and 2 persons anteriorly to EOB initially and then patient was able to scoot more with CGA when patient was able to get to a good position EOB; total assistance of 2 persons via drawsheet in trendelenberg to HOB lying supine  Patient completed Supine to Sit with total assistance and 2 persons with HOB elevated  Patient completed Sit to Supine with total assistance and 2 persons with HOB flat    Functional Mobility/Transfers:  Patient completed Sit <> Stand Transfer with minimum assistance and of 2 persons  with  hand-held assist   Functional Mobility: Patient was able to sidestep to HOB with HHA and mod A of 2 persons. (Cues needed for direction to HOB)    Activities of Daily Living:  Grooming: stand by assistance washing face sitting EOB  Lower Body Dressing: total assistance Donning socks    Cognitive/Visual Perceptual:  Cognitive/Psychosocial Skills:     -       Oriented to: Person, Place, Time, and Situation   -       Follows Commands/attention:Follows multistep  commands  -       Communication: clear/fluent  -       Memory: No Deficits noted  -       Safety awareness/insight to disability: intact   -       Mood/Affect/Coping skills/emotional control: Appropriate to situation  Visual/Perceptual:      -Intact      Physical Exam:  Upper Extremity Range of Motion:     -       Right Upper Extremity: limited shoulder flexion  -       Left Upper Extremity: limited shoulder flexion  Upper Extremity Strength:    -       Right Upper Extremity: 2-/5  -        Left Upper Extremity: 2-/5   Strength:    -       Right Upper Extremity: WFL  -       Left Upper Extremity: WFL  Fine Motor Coordination:    -       Intact  Gross motor coordination:   WFL  Edema noted in B UEs and B LEs.     [facility] 6 Click:  [facility] Total Score: 13    Treatment & Education:  Role of OT and POC  ADL retraining  Functional mobility training  Safety  Discharge planning  Importance EOB/OOB activity    Patient sat EOB initially needing min A for balance and then progressed to SBA.     Co-treatment performed due to patient's multiple deficits requiring two skilled therapists to appropriately and safely assess patient's strength and endurance while facilitating functional tasks in addition to accommodating for patient's activity tolerance.     Patient left HOB elevated with all lines intact, call button in reach, and all needs met.     GOALS:   Multidisciplinary Problems       Occupational Therapy Goals          Problem: Occupational Therapy    Goal Priority Disciplines Outcome Interventions   Occupational Therapy Goal     OT, PT/OT Progressing    Description: Goals to be met by: 12/25/24     Patient will increase functional independence with ADLs by performing:    UE Dressing with Set-up Assistance.  LE Dressing with Stand-by Assistance.  Grooming while standing at sink with Stand-by Assistance.  Toileting from toilet with Stand-by Assistance for hygiene and clothing management.   Step transfer: with SBA and use of LRAD  Supine to sit with SBA  Toilet transfer to toilet with SBA and use of LRAD.  Independent with HEP to BUE to improve ROM                         History:     Past Medical History:   Diagnosis Date    Atrial fibrillation     Bulging disc     Cirrhosis     Colon polyp 12/29/2017    Rpt 5 yrs    EV (esophageal varices)     Hypertension 3/30/2023    Skin cancer 03/2017    Thrombocytopenia          Past Surgical History:   Procedure Laterality Date    CATHETERIZATION OF BOTH LEFT AND RIGHT  HEART N/A 2/8/2023    Procedure: CATHETERIZATION, HEART, BOTH LEFT AND RIGHT;  Surgeon: Flo Malone MD;  Location: [facility] CATH LAB;  Service: Cardiology;  Laterality: N/A;  low bleeding risk 1.0%    CHOLECYSTECTOMY      COLONOSCOPY      COLONOSCOPY N/A 12/29/2017    ta rpt 2022    CORONARY ANGIOGRAPHY N/A 2/8/2023    Procedure: ANGIOGRAM, CORONARY ARTERY;  Surgeon: Flo Malone MD;  Location: [facility] CATH LAB;  Service: Cardiology;  Laterality: N/A;    HERNIA REPAIR      SKIN BIOPSY  03/2017    TONSILLECTOMY      UPPER GASTROINTESTINAL ENDOSCOPY  2011    EV    UPPER GASTROINTESTINAL ENDOSCOPY  2016    VASECTOMY         Time Tracking:     OT Date of Treatment: 11/27/24  OT Start Time: 0924  OT Stop Time: 0957  OT Total Time (min): 33 min    Billable Minutes:Re-eval 10  Self Care/Home Management 23 11/27/2024

## 2024-11-27 NOTE — PLAN OF CARE
[facility]  Discharge Reassessment    Primary Care Provider: Nyla Rios FNP    Expected Discharge Date: 12/2/2024    Reassessment (most recent)       Discharge Reassessment - 11/27/24 1541          Discharge Reassessment    Assessment Type Discharge Planning Reassessment     Did the patient's condition or plan change since previous assessment? No     Discharge Plan discussed with: Patient     Communicated AUTUMN with patient/caregiver Date not available/Unable to determine     Discharge Plan A Home     Discharge Plan B Skilled Nursing Facility;Home Health;Other   out patient rehab    DME Needed Upon Discharge  other (see comments)   TBD    Transition of Care Barriers None     Why the patient remains in the hospital Requires continued medical care        Post-Acute Status    Post-Acute Authorization Placement     Post-Acute Placement Status Pending medical clearance/testing     Discharge Delays None known at this time                     Hepatology consulted for liver transplant.  Patient on SLED and levo.  PT/OT recommended moderate intensity therapy.     Discharge Plan A and Plan B have been determined by review of patient's clinical status, future medical and therapeutic needs, and coverage/benefits for post-acute care in coordination with multidisciplinary team members.'    Selwyn Davis, THERESE  [facility]  X [phone]

## 2024-11-27 NOTE — ASSESSMENT & PLAN NOTE
Baseline creatinine is 1.5    Cr trend 6.3 > 6.4     Plan  - JAE is worsening. Will continue current treatment  - Avoid nephrotoxins and renally dose meds for GFR listed above  - Monitor urine output, serial BMP, and adjust therapy as needed     - Etiology includes volume overload, obstruction, hypotension, possible HRS  - monitor peacock catheter and monitor strict I&O  - was started on midodrine and albumin.  Octreotide started 11/22/2024.  -appreciate nephrology recommendations and recommendation to give 1 dose of IV Lasix 120 mg.   - ICU consulted for Levo administration with MAP goal > 80 . Peripheral Levophed ordered, titration difficult due to tachycardic response, will continue attempting to meet MAP goal however this may be difficult given the patient has Afib.  - Stress dosing with Fludrocortisone and Hydrocortisone as HRS patients tend to have AI-- 11/27 d/c steroids

## 2024-11-27 NOTE — PLAN OF CARE
Problem: Physical Therapy  Goal: Physical Therapy Goal  Description: Goals to be met by: 24     Patient will increase functional independence with mobility by performin. Supine to sit with Minimal Assistance  2. Sit to stand transfer with Stand By Assistance  3. Bed to chair transfer with Stand By Assistance using LRAD  4. Gait  x 150 feet with Stand By Assistance using No LRAD.     Re-evaluated and updated PT POC.  2024  Outcome: Progressing

## 2024-11-27 NOTE — NURSING
MICU DAILY GOALS     Family/Goals of care/Code Status   Code Status: Full Code    24H Vital Sign Range  Temp:  [97.6 °F (36.4 °C)-98 °F (36.7 °C)]   Pulse:  [108-132]   Resp:  [10-21]   SpO2:  [97 %-100 %]   Arterial Line BP: (108-136)/(40-61)      Shift Events (include procedures and significant events)   No acute events throughout shift. Tolerated SLED overnight, no bowel movement. Current gtt levo.    AWAKE RASS: Goal - RASS Goal: 0-->alert and calm  Actual - RASS (Pedraza Agitation-Sedation Scale): alert and calm    Restraint necessity: Not necessary   BREATHE SBT: Not intubated    Coordinate A & B, analgesics/sedatives Pain: managed   SAT: Not intubated   Delirium CAM-ICU: Overall CAM-ICU: Negative   Early(intubated/ Progressive (non-intubated) Mobility MOVE Screen (INTUBATED ONLY): Not intubated    Activity: Activity Management: Arm raise - L1, Rolling - L1   Feeding/Nutrition Diet order: Diet/Nutrition Received: regular, Specialty Diet/Nutrition Received: renal diet   Thrombus DVT prophylaxis: VTE Core Measure: Pharmacological prophylaxis initiated/maintained   HOB Elevation Head of Bed (HOB) Positioning: HOB at 30 degrees   Ulcer Prophylaxis GI: no   Glucose control managed Glycemic Management: blood glucose monitored   Skin Skin assessment:     Sacrum intact/not altered? Yes  Heels intact/not altered? Yes  Surgical wound? No    CHECK ONE!   (no altered skin or altered skin) and sub boxes:  [] No Altered Skin Integrity Present    []Prevention Measures Documented    [x] Altered Skin Integrity Present or Discovered   [x] LDA present in EPIC, daily doc completed              [] LDA added if not in EPIC (describe wound).                    When describing wound, do not stage, use descriptive words only.    [] Wound Image Taken (required on admit,                   transfer/discharge and every Tuesday)    Wound Care Consulted? Yes   Bowel Function constipation    Indwelling Catheter Necessity      Urethral  Catheter 11/26/24 1738-Reason for Continuing Urinary Catheterization: Critically ill in ICU and requiring hourly monitoring of intake/output  [REMOVED]      Urethral Catheter 11/20/24 1802 Double-lumen-Reason for Continuing Urinary Catheterization: Urinary retention    Trialysis (Dialysis) Catheter 11/24/24 0441 right internal jugular-Line Necessity Review: CRRT/HD     De-escalation Antibiotics Yes        VS and assessment per flow sheet, patient progressing towards goals as tolerated, plan of care reviewed with family, all concerns addressed, will continue to monitor.

## 2024-11-27 NOTE — SUBJECTIVE & OBJECTIVE
Interval History/Significant Events: No events overnight    Review of Systems  Objective:     Vital Signs (Most Recent):  Temp: 97.5 °F (36.4 °C) (11/27/24 1101)  Pulse: 99 (11/27/24 1301)  Resp: 19 (11/27/24 1301)  BP: (!) 138/56 (11/25/24 1505)  SpO2: 100 % (11/27/24 1301) Vital Signs (24h Range):  Temp:  [97.5 °F (36.4 °C)-98.1 °F (36.7 °C)] 97.5 °F (36.4 °C)  Pulse:  [] 99  Resp:  [10-21] 19  SpO2:  [95 %-100 %] 100 %  Arterial Line BP: (108-134)/(40-61) 131/53   Weight: (!) 148 kg (326 lb 4.5 oz)  Body mass index is 43.05 kg/m².      Intake/Output Summary (Last 24 hours) at 11/27/2024 1355  Last data filed at 11/27/2024 1001  Gross per 24 hour   Intake 2593.46 ml   Output 4070 ml   Net -1476.54 ml          Physical Exam     Physical Exam  Constitutional:       General: He is not in acute distress.     Appearance: He is obese. He is ill-appearing (chronic). He is not toxic-appearing.   HENT:      Head: Normocephalic and atraumatic.   Eyes:    Extraocular Movements: Extraocular movements intact.     Conjunctiva/sclera: Conjunctivae normal.   Cardiovascular:      Rate and Rhythm: Normal rate. Regular rhythm     Heart sounds: Normal heart sounds.   Pulmonary:      Effort: Pulmonary effort is normal. No respiratory distress.      Breath sounds: No wheezing.   Abdominal:      General: Bowel sounds are normal. There is distension.      Palpations: Abdomen is soft.      Tenderness: There is no abdominal tenderness.      Hernia: A hernia (umbilical and L inguinal hernia) is present.   Musculoskeletal:         General: Normal range of motion.      Right upper arm: Swelling and edema present.      Left upper arm: Swelling and edema present.      Cervical back: Normal range of motion and neck supple.      Right lower leg: Edema present.      Left lower leg: Edema present.   Skin:     General: Skin is warm and dry.      Coloration: Skin is not jaundiced.      Findings: Erythema (RUE) present.   Neurological:       General: No focal deficit present.      Mental Status: He is alert and oriented to person, place, and time. He is confused.      Comments: Asterixis   Psychiatric:         Attention and Perception: He is inattentive.         Behavior: Behavior normal.   Vents:     Lines/Drains/Airways       Central Venous Catheter Line  Duration             Trialysis (Dialysis) Catheter 11/24/24 0441 right internal jugular 3 days              Drain  Duration                  Urethral Catheter 11/26/24 1738 <1 day              Arterial Line  Duration             Arterial Line 11/24/24 0317 Right Radial 3 days              Peripheral Intravenous Line  Duration                  Peripheral IV - Single Lumen 11/27/24 1101 20 G Anterior;Left Forearm <1 day                  Significant Labs:    CBC/Anemia Profile:  Recent Labs   Lab 11/26/24  0335 11/26/24  1035 11/27/24  0209   WBC 4.87 4.59 6.56   HGB 7.8* 7.5* 7.5*   HCT 24.1* 23.3* 23.4*   PLT 48* 38* 30*   MCV 77* 77* 77*   RDW 23.3* 22.9* 23.2*        Chemistries:  Recent Labs   Lab 11/26/24  0335 11/26/24  1347 11/26/24  2325 11/27/24  0209   *  129* 131*  131* 133* 133*  133*   K 4.2  4.2 4.0  4.0 3.9 4.0  4.0     100 100  100 103 104  104   CO2 14*  15* 19*  19* 20* 20*  20*   BUN 42*  42* 42*  42* 23 18  18   CREATININE 5.5*  5.7* 5.8*  5.8* 3.4* 2.6*  2.6*   CALCIUM 9.5  9.3 9.3  9.3 8.4* 8.5*  8.4*   ALBUMIN 3.7  3.6 3.3*  3.3* 3.2* 3.2*  3.2*   PROT 7.4  --   --  6.8   BILITOT 3.1*  --   --  3.4*   ALKPHOS 49  --   --  44   ALT 10  --   --  10   AST 14  --   --  13   MG 2.2 2.3  2.3 2.1 2.0  2.1   PHOS 5.5*  5.4* 5.6*  5.6* 3.3 2.9  2.8       All pertinent labs within the past 24 hours have been reviewed.    Significant Imaging:  I have reviewed all pertinent imaging results/findings within the past 24 hours.

## 2024-11-28 LAB
ALBUMIN SERPL BCP-MCNC: 2.9 G/DL (ref 3.5–5.2)
ALBUMIN SERPL BCP-MCNC: 2.9 G/DL (ref 3.5–5.2)
ALBUMIN SERPL BCP-MCNC: 3 G/DL (ref 3.5–5.2)
ALP SERPL-CCNC: 42 U/L (ref 40–150)
ALT SERPL W/O P-5'-P-CCNC: 9 U/L (ref 10–44)
ANION GAP SERPL CALC-SCNC: 10 MMOL/L (ref 8–16)
ANION GAP SERPL CALC-SCNC: 11 MMOL/L (ref 8–16)
ANION GAP SERPL CALC-SCNC: 7 MMOL/L (ref 8–16)
ANISOCYTOSIS BLD QL SMEAR: SLIGHT
AST SERPL-CCNC: 12 U/L (ref 10–40)
BASOPHILS # BLD AUTO: 0.01 K/UL (ref 0–0.2)
BASOPHILS NFR BLD: 0.1 % (ref 0–1.9)
BILIRUB SERPL-MCNC: 3.2 MG/DL (ref 0.1–1)
BUN SERPL-MCNC: 17 MG/DL (ref 8–23)
BUN SERPL-MCNC: 18 MG/DL (ref 8–23)
BUN SERPL-MCNC: 21 MG/DL (ref 8–23)
BURR CELLS BLD QL SMEAR: ABNORMAL
CALCIUM SERPL-MCNC: 7.9 MG/DL (ref 8.7–10.5)
CALCIUM SERPL-MCNC: 8 MG/DL (ref 8.7–10.5)
CALCIUM SERPL-MCNC: 8.2 MG/DL (ref 8.7–10.5)
CHLORIDE SERPL-SCNC: 104 MMOL/L (ref 95–110)
CHLORIDE SERPL-SCNC: 105 MMOL/L (ref 95–110)
CHLORIDE SERPL-SCNC: 105 MMOL/L (ref 95–110)
CO2 SERPL-SCNC: 18 MMOL/L (ref 23–29)
CO2 SERPL-SCNC: 19 MMOL/L (ref 23–29)
CO2 SERPL-SCNC: 22 MMOL/L (ref 23–29)
CREAT SERPL-MCNC: 2.9 MG/DL (ref 0.5–1.4)
CREAT SERPL-MCNC: 3 MG/DL (ref 0.5–1.4)
CREAT SERPL-MCNC: 3.6 MG/DL (ref 0.5–1.4)
DIFFERENTIAL METHOD BLD: ABNORMAL
EOSINOPHIL # BLD AUTO: 0 K/UL (ref 0–0.5)
EOSINOPHIL NFR BLD: 0 % (ref 0–8)
ERYTHROCYTE [DISTWIDTH] IN BLOOD BY AUTOMATED COUNT: 23.3 % (ref 11.5–14.5)
EST. GFR  (NO RACE VARIABLE): 17.3 ML/MIN/1.73 M^2
EST. GFR  (NO RACE VARIABLE): 21.5 ML/MIN/1.73 M^2
EST. GFR  (NO RACE VARIABLE): 22.4 ML/MIN/1.73 M^2
GIANT PLATELETS BLD QL SMEAR: PRESENT
GLUCOSE SERPL-MCNC: 104 MG/DL (ref 70–110)
GLUCOSE SERPL-MCNC: 130 MG/DL (ref 70–110)
GLUCOSE SERPL-MCNC: 137 MG/DL (ref 70–110)
HCT VFR BLD AUTO: 24.4 % (ref 40–54)
HGB BLD-MCNC: 8 G/DL (ref 14–18)
HYPOCHROMIA BLD QL SMEAR: ABNORMAL
IMM GRANULOCYTES # BLD AUTO: 0.05 K/UL (ref 0–0.04)
IMM GRANULOCYTES NFR BLD AUTO: 0.5 % (ref 0–0.5)
LACTATE SERPL-SCNC: 2.7 MMOL/L (ref 0.5–2.2)
LACTATE SERPL-SCNC: 2.9 MMOL/L (ref 0.5–2.2)
LIPASE SERPL-CCNC: 29 U/L (ref 4–60)
LYMPHOCYTES # BLD AUTO: 0.3 K/UL (ref 1–4.8)
LYMPHOCYTES NFR BLD: 2.7 % (ref 18–48)
MAGNESIUM SERPL-MCNC: 1.9 MG/DL (ref 1.6–2.6)
MAGNESIUM SERPL-MCNC: 2.1 MG/DL (ref 1.6–2.6)
MAGNESIUM SERPL-MCNC: 2.3 MG/DL (ref 1.6–2.6)
MCH RBC QN AUTO: 24.8 PG (ref 27–31)
MCHC RBC AUTO-ENTMCNC: 32.8 G/DL (ref 32–36)
MCV RBC AUTO: 76 FL (ref 82–98)
MONOCYTES # BLD AUTO: 1 K/UL (ref 0.3–1)
MONOCYTES NFR BLD: 10.4 % (ref 4–15)
NEUTROPHILS # BLD AUTO: 8.3 K/UL (ref 1.8–7.7)
NEUTROPHILS NFR BLD: 86.3 % (ref 38–73)
NRBC BLD-RTO: 0 /100 WBC
OVALOCYTES BLD QL SMEAR: ABNORMAL
PHOSPHATE SERPL-MCNC: 3.1 MG/DL (ref 2.7–4.5)
PHOSPHATE SERPL-MCNC: 3.3 MG/DL (ref 2.7–4.5)
PHOSPHATE SERPL-MCNC: 4.2 MG/DL (ref 2.7–4.5)
PLATELET # BLD AUTO: 31 K/UL (ref 150–450)
PLATELET BLD QL SMEAR: ABNORMAL
PMV BLD AUTO: ABNORMAL FL (ref 9.2–12.9)
POIKILOCYTOSIS BLD QL SMEAR: SLIGHT
POLYCHROMASIA BLD QL SMEAR: ABNORMAL
POTASSIUM SERPL-SCNC: 3.5 MMOL/L (ref 3.5–5.1)
POTASSIUM SERPL-SCNC: 3.8 MMOL/L (ref 3.5–5.1)
POTASSIUM SERPL-SCNC: 3.9 MMOL/L (ref 3.5–5.1)
PROT SERPL-MCNC: 6.2 G/DL (ref 6–8.4)
RBC # BLD AUTO: 3.22 M/UL (ref 4.6–6.2)
SCHISTOCYTES BLD QL SMEAR: ABNORMAL
SCHISTOCYTES BLD QL SMEAR: PRESENT
SODIUM SERPL-SCNC: 133 MMOL/L (ref 136–145)
SODIUM SERPL-SCNC: 133 MMOL/L (ref 136–145)
SODIUM SERPL-SCNC: 135 MMOL/L (ref 136–145)
WBC # BLD AUTO: 9.61 K/UL (ref 3.9–12.7)

## 2024-11-28 PROCEDURE — 27000221 HC OXYGEN, UP TO 24 HOURS

## 2024-11-28 PROCEDURE — 63600175 PHARM REV CODE 636 W HCPCS

## 2024-11-28 PROCEDURE — 99232 SBSQ HOSP IP/OBS MODERATE 35: CPT | Mod: ,,, | Performed by: INTERNAL MEDICINE

## 2024-11-28 PROCEDURE — 94761 N-INVAS EAR/PLS OXIMETRY MLT: CPT

## 2024-11-28 PROCEDURE — 25000003 PHARM REV CODE 250: Performed by: STUDENT IN AN ORGANIZED HEALTH CARE EDUCATION/TRAINING PROGRAM

## 2024-11-28 PROCEDURE — 83605 ASSAY OF LACTIC ACID: CPT

## 2024-11-28 PROCEDURE — 83605 ASSAY OF LACTIC ACID: CPT | Mod: 91

## 2024-11-28 PROCEDURE — 99291 CRITICAL CARE FIRST HOUR: CPT | Mod: ,,, | Performed by: INTERNAL MEDICINE

## 2024-11-28 PROCEDURE — 80053 COMPREHEN METABOLIC PANEL: CPT

## 2024-11-28 PROCEDURE — 83690 ASSAY OF LIPASE: CPT

## 2024-11-28 PROCEDURE — 84100 ASSAY OF PHOSPHORUS: CPT

## 2024-11-28 PROCEDURE — 63600175 PHARM REV CODE 636 W HCPCS: Mod: JZ,JG | Performed by: STUDENT IN AN ORGANIZED HEALTH CARE EDUCATION/TRAINING PROGRAM

## 2024-11-28 PROCEDURE — 25000003 PHARM REV CODE 250: Performed by: INTERNAL MEDICINE

## 2024-11-28 PROCEDURE — 99900035 HC TECH TIME PER 15 MIN (STAT)

## 2024-11-28 PROCEDURE — P9047 ALBUMIN (HUMAN), 25%, 50ML: HCPCS | Mod: JZ,JG | Performed by: STUDENT IN AN ORGANIZED HEALTH CARE EDUCATION/TRAINING PROGRAM

## 2024-11-28 PROCEDURE — 63600175 PHARM REV CODE 636 W HCPCS: Performed by: INTERNAL MEDICINE

## 2024-11-28 PROCEDURE — 63600175 PHARM REV CODE 636 W HCPCS: Performed by: STUDENT IN AN ORGANIZED HEALTH CARE EDUCATION/TRAINING PROGRAM

## 2024-11-28 PROCEDURE — 83735 ASSAY OF MAGNESIUM: CPT

## 2024-11-28 PROCEDURE — 99233 SBSQ HOSP IP/OBS HIGH 50: CPT | Mod: ,,, | Performed by: INTERNAL MEDICINE

## 2024-11-28 PROCEDURE — 02HV33Z INSERTION OF INFUSION DEVICE INTO SUPERIOR VENA CAVA, PERCUTANEOUS APPROACH: ICD-10-PCS | Performed by: STUDENT IN AN ORGANIZED HEALTH CARE EDUCATION/TRAINING PROGRAM

## 2024-11-28 PROCEDURE — 83735 ASSAY OF MAGNESIUM: CPT | Mod: 91 | Performed by: STUDENT IN AN ORGANIZED HEALTH CARE EDUCATION/TRAINING PROGRAM

## 2024-11-28 PROCEDURE — 94799 UNLISTED PULMONARY SVC/PX: CPT

## 2024-11-28 PROCEDURE — 80069 RENAL FUNCTION PANEL: CPT | Performed by: STUDENT IN AN ORGANIZED HEALTH CARE EDUCATION/TRAINING PROGRAM

## 2024-11-28 PROCEDURE — 85025 COMPLETE CBC W/AUTO DIFF WBC: CPT

## 2024-11-28 PROCEDURE — 25000003 PHARM REV CODE 250

## 2024-11-28 PROCEDURE — 20000000 HC ICU ROOM

## 2024-11-28 PROCEDURE — 90945 DIALYSIS ONE EVALUATION: CPT

## 2024-11-28 RX ORDER — MAGNESIUM SULFATE HEPTAHYDRATE 40 MG/ML
2 INJECTION, SOLUTION INTRAVENOUS
Status: DISPENSED | OUTPATIENT
Start: 2024-11-28 | End: 2024-11-29

## 2024-11-28 RX ORDER — LACTULOSE 10 G/15ML
200 SOLUTION ORAL; RECTAL EVERY 6 HOURS
Status: DISCONTINUED | OUTPATIENT
Start: 2024-11-29 | End: 2024-11-29

## 2024-11-28 RX ORDER — PANTOPRAZOLE SODIUM 40 MG/10ML
40 INJECTION, POWDER, LYOPHILIZED, FOR SOLUTION INTRAVENOUS DAILY
Status: DISCONTINUED | OUTPATIENT
Start: 2024-11-28 | End: 2024-11-30

## 2024-11-28 RX ORDER — PANTOPRAZOLE SODIUM 40 MG/10ML
40 INJECTION, POWDER, LYOPHILIZED, FOR SOLUTION INTRAVENOUS
Status: DISCONTINUED | OUTPATIENT
Start: 2024-11-28 | End: 2024-11-28

## 2024-11-28 RX ORDER — NOREPINEPHRINE BIT/0.9 % NACL 32MG/250ML
0-3 PLASTIC BAG, INJECTION (ML) INTRAVENOUS CONTINUOUS
Status: DISCONTINUED | OUTPATIENT
Start: 2024-11-28 | End: 2024-11-29

## 2024-11-28 RX ORDER — POTASSIUM CHLORIDE 29.8 MG/ML
40 INJECTION INTRAVENOUS ONCE
Status: COMPLETED | OUTPATIENT
Start: 2024-11-28 | End: 2024-11-28

## 2024-11-28 RX ORDER — HYDROCODONE BITARTRATE AND ACETAMINOPHEN 500; 5 MG/1; MG/1
TABLET ORAL CONTINUOUS
Status: ACTIVE | OUTPATIENT
Start: 2024-11-28 | End: 2024-11-29

## 2024-11-28 RX ADMIN — NOREPINEPHRINE BITARTRATE 0.02 MCG/KG/MIN: 1 INJECTION, SOLUTION, CONCENTRATE INTRAVENOUS at 09:11

## 2024-11-28 RX ADMIN — PIPERACILLIN SODIUM AND TAZOBACTAM SODIUM 4.5 G: 4; .5 INJECTION, POWDER, FOR SOLUTION INTRAVENOUS at 04:11

## 2024-11-28 RX ADMIN — PIPERACILLIN SODIUM AND TAZOBACTAM SODIUM 4.5 G: 4; .5 INJECTION, POWDER, FOR SOLUTION INTRAVENOUS at 05:11

## 2024-11-28 RX ADMIN — MAGNESIUM SULFATE HEPTAHYDRATE 2 G: 40 INJECTION, SOLUTION INTRAVENOUS at 07:11

## 2024-11-28 RX ADMIN — ONDANSETRON 4 MG: 2 INJECTION INTRAMUSCULAR; INTRAVENOUS at 02:11

## 2024-11-28 RX ADMIN — ONDANSETRON 4 MG: 2 INJECTION INTRAMUSCULAR; INTRAVENOUS at 11:11

## 2024-11-28 RX ADMIN — PIPERACILLIN SODIUM AND TAZOBACTAM SODIUM 4.5 G: 4; .5 INJECTION, POWDER, FOR SOLUTION INTRAVENOUS at 09:11

## 2024-11-28 RX ADMIN — SODIUM PHOSPHATE, MONOBASIC, MONOHYDRATE AND SODIUM PHOSPHATE, DIBASIC, ANHYDROUS 20.01 MMOL: 142; 276 INJECTION, SOLUTION INTRAVENOUS at 09:11

## 2024-11-28 RX ADMIN — LACTULOSE 200 G: 10 SOLUTION ORAL at 08:11

## 2024-11-28 RX ADMIN — ALBUMIN (HUMAN) 12.5 G: 12.5 SOLUTION INTRAVENOUS at 05:11

## 2024-11-28 RX ADMIN — POTASSIUM CHLORIDE 40 MEQ: 29.8 INJECTION, SOLUTION INTRAVENOUS at 11:11

## 2024-11-28 RX ADMIN — LACTULOSE 200 G: 10 SOLUTION ORAL at 11:11

## 2024-11-28 RX ADMIN — PANTOPRAZOLE SODIUM 40 MG: 40 INJECTION, POWDER, LYOPHILIZED, FOR SOLUTION INTRAVENOUS at 08:11

## 2024-11-28 RX ADMIN — SODIUM CHLORIDE: 9 INJECTION, SOLUTION INTRAVENOUS at 08:11

## 2024-11-28 RX ADMIN — NOREPINEPHRINE BITARTRATE 0.02 MCG/KG/MIN: 1 INJECTION, SOLUTION, CONCENTRATE INTRAVENOUS at 01:11

## 2024-11-28 RX ADMIN — ALBUMIN (HUMAN) 12.5 G: 12.5 SOLUTION INTRAVENOUS at 01:11

## 2024-11-28 NOTE — PROGRESS NOTES
[facility] Progress Note      Attending: Saad Rahman MD   Admit Date: 11/20/2024  Today's Date: 11/28/2024  Reason for Consult:      SUBJECTIVE:     Interval History:   Remains on pressors to maintain MAP goal. He appears grossly weak and has been mostly in the bed. Abdomen is grossly distended this morning, along with minimal BM and emesis. XRAY abdomen concern for ileus vs obstruction.     Infusions:     sodium chloride 0.9%   Intravenous Continuous   Stopped at 11/28/24 0347    NORepinephrine bitartrate-D5W  0-3 mcg/kg/min Intravenous Continuous 4.2 mL/hr at 11/28/24 0855 0.06 mcg/kg/min at 11/28/24 0855       Scheduled Medications:    albumin human 25%  12.5 g Intravenous Q6H    lactulose  200 g Rectal TID    lactulose  30 g Oral Q2H    midodrine  15 mg Oral TID    pantoprazole  40 mg Intravenous Daily    piperacillin-tazobactam (Zosyn) IV (PEDS and ADULTS) (extended infusion is not appropriate)  4.5 g Intravenous Q12H    tamsulosin  1 capsule Oral QHS       PRN Medications:     Current Facility-Administered Medications:     0.9%  NaCl infusion (for blood administration), , Intravenous, Q24H PRN    albuterol-ipratropium, 3 mL, Nebulization, Q6H PRN    aluminum-magnesium hydroxide-simethicone, 30 mL, Oral, QID PRN    dextrose 10%, 12.5 g, Intravenous, PRN    dextrose 10%, 25 g, Intravenous, PRN    glucagon (human recombinant), 1 mg, Intramuscular, PRN    glucose, 16 g, Oral, PRN    glucose, 24 g, Oral, PRN    magnesium sulfate IVPB, 2 g, Intravenous, PRN    melatonin, 6 mg, Oral, Nightly PRN    naloxone, 0.02 mg, Intravenous, PRN    ondansetron, 4 mg, Intravenous, Q8H PRN    simethicone, 1 tablet, Oral, QID PRN    sodium chloride 0.9%, 10 mL, Intravenous, Q12H PRN    sodium chloride 0.9%, 10 mL, Intravenous, PRN    sodium phosphate 20.01 mmol in D5W 250 mL IVPB, 20.01 mmol, Intravenous, PRN    sodium phosphate 30 mmol in D5W 250 mL IVPB, 30 mmol, Intravenous, PRN    sodium phosphate 39.99  mmol in D5W 250 mL IVPB, 39.99 mmol, Intravenous, PRN    OBJECTIVE:     Vital Signs Trends/Hx Reviewed  Vitals:    11/28/24 0915 11/28/24 0930 11/28/24 0945 11/28/24 1000   BP:       Pulse: 105 106 103 104   Resp: 18 15 15 12   Temp:       TempSrc:       SpO2: (!) 90% (!) 93% (!) 92% (!) 92%   Weight:       Height:             Physical Exam  Vitals reviewed.   Constitutional:       General: He is not in acute distress.     Appearance: He is ill-appearing.   Pulmonary:      Effort: Pulmonary effort is normal. No respiratory distress.   Abdominal:      General: There is distension.   Musculoskeletal:      Right lower leg: Edema present.      Left lower leg: Edema present.   Neurological:      Mental Status: He is alert. Mental status is at baseline.        Laboratory:  Lab results in last 24 hours reviewed.     MELD 3.0: 32 at 11/28/2024  2:50 AM  MELD-Na: 33 at 11/28/2024  2:50 AM  Calculated from:  Serum Creatinine: On dialysis. Using the maximum value.  Serum Sodium: 133 mmol/L at 11/28/2024  2:50 AM  Total Bilirubin: 3.2 mg/dL at 11/28/2024  2:50 AM  Serum Albumin: 3 g/dL at 11/28/2024  2:50 AM  INR(ratio): 2 at 11/27/2024  2:09 AM  Age at listing (hypothetical): 69 years  Sex: Male at 11/28/2024  2:50 AM      ASSESSMENT & RECOMMENDATIONS   Juan Carlos Yoo Sr. is a 69 y.o. male with history of EtOH use disorder, EtOH cirrhosis, HCC s/p y90, morbid obesity BMI 44, HTN, and Afib who presents with severe anasarca and JAE. Known history of cirrhosis and is established patient of Dr. Daniels. Was previously evaluated for transplant and was declined due to persistently positive PETHs in the past and also due to concern over BMI. Since May 2023 PETH has been negative, though patient reports he has not drank alcohol for the last 6 months at least. Presents now with decompensated cirrhosis and with significant renal injury with Cr 5.7 up from 1.5 a month prior. Unclear etiology of cirrhosis decompensation. He does report  non-adherence with fluid restriction and volume overload could be contributing; unclear if patient has been taking medications appropriately given his poor insight. Also presents with marked renal dysfunction which is new from 1 month prior, and worsening renal function which is concerning for HRS. No prior known history of CHF and last TTE in 2022 with normal systolic function. Patient does report changes to his diuretic regimen at home recently that were made while he was at [facility] and is not sure if these changes were helping him keep fluid off. No LTAC records available, but it seems that patient had home lasix increased to 60mg BID and had metolazone 2.5 every other week added. Patient with fairly limited insight into his medical conditions and his medications, and has poor social support at home with only his son nearby who is a single father. PETH from 11/20 negative (last positive 2/2023).  Blood cultures from admission with B. Diminuta, micrococcus luteus, lysinobacillus species. ID consulted, on vancomycin and zosyn. Nephrology following for JAE; has been on SLED. Etiology of JAE likely HRS, has been in the MICU on pressor support. PT/OT recommending moderate intensity therapy.      Problem List:  Decompensated cirrhosis with ascites  Acute renal failure, concerning for HRS  Severe obesity, BMI 43  Hx of EtOH Use disorder  Hx of HCC s/p Y90  Shock      Recommendations:  - He is unfortunately not a transplant candidate at this time as he remains in the ICU critically ill in renal failure requiring pressor support. Additional prohibiting factors to liver transplant are poor functional status/weakness and poor insight into clinical illness. He does appear to have a good relationship with his son who has visited him in the hospital, but it is unclear whether he would be able to be a full time caregiver should he ever improve enough to be considered for transplant. Once off of pressor support and improvement in  functional status, may consider/discuss transplant candidacy.   - Agree with CT imaging given diffuse abdominal distension to rule out obstruction.   - Nephrology following for JAE; are considering holding SLED today and stopping pressors to maintain a MAP goal of 65.   - Antimicrobial management per ID.   - Please obtain daily CBC, CMP, PT/INR.     Thank you for allowing us to participate in the care of this patient. Please call with questions.      uEgenia Zuluaga MD  Gastroenterology Fellow, PGY-V  [facility]

## 2024-11-28 NOTE — NURSING
MICU DAILY GOALS     Family/Goals of care/Code Status   Code Status: Full Code    24H Vital Sign Range  Temp:  [96.5 °F (35.8 °C)-97.6 °F (36.4 °C)]   Pulse:  []   Resp:  [10-40]   BP: ()/(51-79)   SpO2:  [88 %-100 %]   Arterial Line BP: ()/(41-59)      Shift Events (include procedures and significant events)   18 fr NGT placed in Left nare by Primary team, Pt experienced 2 lrg episodes of projectile vomitting when attempting to place by this nurse. Levophed on hold after able to achieve target MAP of 60. 1 episode of intermittent asymptomatic VT, team made aware. Awaiting SLED. Visited by son.    AWAKE RASS: Goal - RASS Goal: 0-->alert and calm  Actual - RASS (Pedraza Agitation-Sedation Scale): alert and calm    Restraint necessity: Not necessary   BREATHE SBT: NA    Coordinate A & B, analgesics/sedatives Pain: managed   SAT: NA   Delirium CAM-ICU: Overall CAM-ICU: Negative   Early(intubated/ Progressive (non-intubated) Mobility MOVE Screen (INTUBATED ONLY): NA    Activity: Activity Management: Rolling - L1   Feeding/Nutrition Diet order: Diet/Nutrition Received: consistent carb/diabetic diet, Specialty Diet/Nutrition Received: renal diet   Thrombus DVT prophylaxis: VTE Core Measure: Pharmacological prophylaxis initiated/maintained   HOB Elevation Head of Bed (HOB) Positioning: HOB at 30 degrees   Ulcer Prophylaxis GI: yes   Glucose control managed Glycemic Management: blood glucose monitored   Skin Skin assessment:     Sacrum intact/not altered? Yes  Heels intact/not altered? Yes  Surgical wound? No    CHECK ONE!   (no altered skin or altered skin) and sub boxes:  [x] No Altered Skin Integrity Present    []Prevention Measures Documented    [] Altered Skin Integrity Present or Discovered   [] LDA present in EPIC, daily doc completed              [] LDA added if not in EPIC (describe wound).                    When describing wound, do not stage, use descriptive words only.    [] Wound Image Taken  (required on admit,                   transfer/discharge and every Tuesday)    Wound Care Consulted? No   Bowel Function Fecal incontinence     Indwelling Catheter Necessity      Urethral Catheter 11/26/24 1738-Reason for Continuing Urinary Catheterization: Critically ill in ICU and requiring hourly monitoring of intake/output  [REMOVED]      Urethral Catheter 11/20/24 1802 Double-lumen-Reason for Continuing Urinary Catheterization: Urinary retention    Trialysis (Dialysis) Catheter 11/24/24 0441 right internal jugular-Line Necessity Review: CRRT/HD  yes   De-escalation Antibiotics No        VS and assessment per flow sheet, patient progressing towards goals as tolerated, plan of care reviewed with  patient and family , all concerns addressed, will continue to monitor.

## 2024-11-28 NOTE — SUBJECTIVE & OBJECTIVE
Interval History: NAEON. Tolerated SLED overnight. Renal function did not show any response to higher MAP goals.     Review of patient's allergies indicates:  No Known Allergies  Current Facility-Administered Medications   Medication Frequency    0.9%  NaCl infusion (CRRT USE ONLY) Continuous    0.9%  NaCl infusion (for blood administration) Q24H PRN    albumin human 25% bottle 12.5 g Q6H    albuterol-ipratropium 2.5 mg-0.5 mg/3 mL nebulizer solution 3 mL Q6H PRN    aluminum-magnesium hydroxide-simethicone 200-200-20 mg/5 mL suspension 30 mL QID PRN    dextrose 10% bolus 125 mL 125 mL PRN    dextrose 10% bolus 250 mL 250 mL PRN    glucagon (human recombinant) injection 1 mg PRN    glucose chewable tablet 16 g PRN    glucose chewable tablet 24 g PRN    lactulose 10 gram/15 mL enema solution (inpatient use) 200 g TID    lactulose 20 gram/30 mL solution Soln 30 g Q2H    magnesium sulfate 2g in water 50mL IVPB (premix) PRN    melatonin tablet 6 mg Nightly PRN    midodrine tablet 15 mg TID    naloxone 0.4 mg/mL injection 0.02 mg PRN    NORepinephrine bitartrate-NaCl 32 mg/250 mL (128 mcg/mL) infusion Continuous    ondansetron injection 4 mg Q8H PRN    pantoprazole injection 40 mg Daily    piperacillin-tazobactam (ZOSYN) 4.5 g in D5W 100 mL IVPB (MB+) Q12H    simethicone chewable tablet 80 mg QID PRN    sodium chloride 0.9% flush 10 mL Q12H PRN    sodium chloride 0.9% flush 10 mL PRN    sodium phosphate 20.01 mmol in D5W 250 mL IVPB PRN    sodium phosphate 30 mmol in D5W 250 mL IVPB PRN    sodium phosphate 39.99 mmol in D5W 250 mL IVPB PRN    tamsulosin 24 hr capsule 0.4 mg QHS       Objective:     Vital Signs (Most Recent):  Temp: 97.5 °F (36.4 °C) (11/28/24 0715)  Pulse: 108 (11/28/24 1200)  Resp: 12 (11/28/24 1200)  BP: (!) 91/51 (11/28/24 0900)  SpO2: (!) 94 % (11/28/24 1200) Vital Signs (24h Range):  Temp:  [96.5 °F (35.8 °C)-97.5 °F (36.4 °C)] 97.5 °F (36.4 °C)  Pulse:  [] 108  Resp:  [10-40] 12  SpO2:  [88  %-100 %] 94 %  BP: ()/(51-79) 91/51  Arterial Line BP: (104-131)/(41-59) 123/48     Weight: (!) 152 kg (335 lb 1.6 oz) (11/28/24 0536)  Body mass index is 44.21 kg/m².  Body surface area is 2.8 meters squared.    I/O last 3 completed shifts:  In: 5490.5 [P.O.:960; I.V.:3956.5; IV Piggyback:574]  Out: 7788 [Urine:145; Emesis/NG output:400; Other:7243]     Physical Exam  Vitals and nursing note reviewed.   Constitutional:       General: He is awake. He is not in acute distress.     Appearance: He is obese. He is ill-appearing. He is not toxic-appearing.   HENT:      Head: Normocephalic and atraumatic.   Eyes:      General: No scleral icterus.     Extraocular Movements: Extraocular movements intact.      Conjunctiva/sclera: Conjunctivae normal.   Cardiovascular:      Rate and Rhythm: Tachycardia present. Rhythm irregular.   Pulmonary:      Effort: Pulmonary effort is normal. No respiratory distress.   Abdominal:      General: There is distension.      Palpations: Abdomen is soft.      Tenderness: There is no abdominal tenderness. There is no guarding or rebound.   Musculoskeletal:         General: No swelling or tenderness.      Right lower leg: Edema present.      Left lower leg: Edema present.      Comments: 2+ pitting edema in bilateral lower extremities   Skin:     General: Skin is warm.      Coloration: Skin is not jaundiced.      Findings: Bruising present.   Neurological:      Mental Status: He is alert and oriented to person, place, and time.      Motor: No weakness.   Psychiatric:         Behavior: Behavior is slowed.        Significant Labs:  CBC:   Recent Labs   Lab 11/28/24  0250   WBC 9.61   RBC 3.22*   HGB 8.0*   HCT 24.4*   PLT 31*   MCV 76*   MCH 24.8*   MCHC 32.8     CMP:   Recent Labs   Lab 11/28/24  0250   *   CALCIUM 8.0*   ALBUMIN 3.0*   PROT 6.2   *   K 3.5   CO2 18*      BUN 18   CREATININE 3.0*   ALKPHOS 42   ALT 9*   AST 12   BILITOT 3.2*     All labs within the past 24  hours have been reviewed.     Significant Imaging:  All imaging within the past 24 hours have been reviewed.

## 2024-11-28 NOTE — ASSESSMENT & PLAN NOTE
MELD 3.0: 32 at 11/28/2024  2:50 AM  MELD-Na: 30 at 11/28/2024  2:50 AM  Calculated from:  Serum Creatinine: 3 mg/dL at 11/28/2024  2:50 AM  Serum Sodium: 133 mmol/L at 11/28/2024  2:50 AM  Total Bilirubin: 3.2 mg/dL at 11/28/2024  2:50 AM  Serum Albumin: 3 g/dL at 11/28/2024  2:50 AM  INR(ratio): 2 at 11/27/2024  2:09 AM  Age at listing (hypothetical): 69 years  Sex: Male at 11/28/2024  2:50 AM    --Hepatology following, pt not transplant eligible at this time.

## 2024-11-28 NOTE — PLAN OF CARE
MICU DAILY GOALS     Family/Goals of care/Code Status   Code Status: Full Code    24H Vital Sign Range  Temp:  [96.5 °F (35.8 °C)-98.1 °F (36.7 °C)]   Pulse:  []   Resp:  [10-40]   BP: ()/(51-79)   SpO2:  [92 %-100 %]   Arterial Line BP: (104-132)/(42-69)      Shift Events (include procedures and significant events)   Had 3 small/medium sized bowel movements. Completed 10 hr CRRT run without difficulties.Now having nausea and vomiting with oral intake.Dr. Trinidad made aware of this,kub obtained and abdominal ultraosound performed by MD.    AWAKE RASS: Goal - RASS Goal: 0-->alert and calm  Actual - RASS (Pedraza Agitation-Sedation Scale): alert and calm    Restraint necessity: Not necessary   BREATHE SBT: Not intubated    Coordinate A & B, analgesics/sedatives Pain: managed   SAT: Not intubated   Delirium CAM-ICU: Overall CAM-ICU: Negative   Early(intubated/ Progressive (non-intubated) Mobility MOVE Screen (INTUBATED ONLY): Not intubated    Activity: Activity Management: Rolling - L1   Feeding/Nutrition Diet order: Diet/Nutrition Received: consistent carb/diabetic diet, Specialty Diet/Nutrition Received: renal diet   Thrombus DVT prophylaxis: VTE Core Measure: Pharmacological prophylaxis initiated/maintained   HOB Elevation Head of Bed (HOB) Positioning: HOB at 20-30 degrees   Ulcer Prophylaxis GI: yes   Glucose control managed Glycemic Management: blood glucose monitored   Skin Skin assessment:     Sacrum intact/not altered? Yes  Heels intact/not altered? Yes  Surgical wound? No    CHECK ONE!   (no altered skin or altered skin) and sub boxes:  [] No Altered Skin Integrity Present    []Prevention Measures Documented    [x] Altered Skin Integrity Present or Discovered   [x] LDA present in EPIC, daily doc completed              [] LDA added if not in EPIC (describe wound).                    When describing wound, do not stage, use descriptive words only.    [] Wound Image Taken (required on admit,                    transfer/discharge and every Tuesday)    Wound Care Consulted? No   Bowel Function constipation despite lactulose dosin   Indwelling Catheter Necessity      Urethral Catheter 11/26/24 1738-Reason for Continuing Urinary Catheterization: Critically ill in ICU and requiring hourly monitoring of intake/output, Urinary retention  [REMOVED]      Urethral Catheter 11/20/24 1802 Double-lumen-Reason for Continuing Urinary Catheterization: Urinary retention    Trialysis (Dialysis) Catheter 11/24/24 0441 right internal jugular-Line Necessity Review: CRRT/HD     De-escalation Antibiotics Yes        VS and assessment per flow sheet, patient progressing towards goals as tolerated, plan of care reviewed with Juan Carlos Yoo, all concerns addressed, will continue to monitor.

## 2024-11-28 NOTE — SUBJECTIVE & OBJECTIVE
Interval History/Significant Events: Nausea and abdominal pain overnight.     Review of Systems   Respiratory:  Negative for shortness of breath.    Cardiovascular:  Negative for chest pain.   Gastrointestinal:  Positive for abdominal distention, abdominal pain, constipation and vomiting.     Objective:     Vital Signs (Most Recent):  Temp: 97.5 °F (36.4 °C) (11/28/24 1200)  Pulse: 105 (11/28/24 1315)  Resp: 13 (11/28/24 1315)  BP: (!) 91/51 (11/28/24 0900)  SpO2: (!) 91 % (11/28/24 1315) Vital Signs (24h Range):  Temp:  [96.5 °F (35.8 °C)-97.5 °F (36.4 °C)] 97.5 °F (36.4 °C)  Pulse:  [] 105  Resp:  [10-40] 13  SpO2:  [88 %-100 %] 91 %  BP: ()/(51-79) 91/51  Arterial Line BP: ()/(41-59) 106/41   Weight: (!) 152 kg (335 lb 1.6 oz)  Body mass index is 44.21 kg/m².      Intake/Output Summary (Last 24 hours) at 11/28/2024 1358  Last data filed at 11/28/2024 1311  Gross per 24 hour   Intake 3740.38 ml   Output 5031 ml   Net -1290.62 ml          Physical Exam     Physical Exam  Constitutional:       General: He is not in acute distress.     Appearance: He is obese. He is ill-appearing (chronic). He is not toxic-appearing.   HENT:      Head: Normocephalic and atraumatic.   Eyes:    Extraocular Movements: Extraocular movements intact.     Conjunctiva/sclera: Conjunctivae normal.   Cardiovascular:      Rate and Rhythm: Normal rate. Regular rhythm     Heart sounds: Normal heart sounds.   Pulmonary:      Effort: Pulmonary effort is normal. No respiratory distress.      Breath sounds: No wheezing.   Abdominal:      General: Bowel sounds are hypoactive. There is distension.      Palpations: Abdomen is firm.      Tenderness: There is no abdominal tenderness.      Hernia: A hernia (umbilical and L inguinal hernia) is present.   Musculoskeletal:         General: Normal range of motion.      Right upper arm: Swelling and edema present.      Left upper arm: Swelling and edema present.      Cervical back: Normal  range of motion and neck supple.      Right lower leg: Edema present.      Left lower leg: Edema present.   Skin:     General: Skin is warm and dry.      Coloration: Skin is not jaundiced.      Findings: Erythema (RUE) present.   Neurological:      General: No focal deficit present.      Mental Status: He is alert and oriented to person, place, and time.  Psychiatric:         Attention and Perception: Normal       Behavior: Behavior normal.   Vents:     Lines/Drains/Airways       Central Venous Catheter Line  Duration             Trialysis (Dialysis) Catheter 11/24/24 0441 right internal jugular 4 days              Drain  Duration                  Urethral Catheter 11/26/24 1738 1 day         Drain/Device  11/28/24 0855 Left proximal <1 day         NG/OG Tube 11/28/24 1306 18 Fr. Left nostril <1 day              Arterial Line  Duration             Arterial Line 11/24/24 0317 Right Radial 4 days              Peripheral Intravenous Line  Duration                  Peripheral IV - Single Lumen 11/27/24 1101 20 G Anterior;Left Forearm 1 day                  Significant Labs:    CBC/Anemia Profile:  Recent Labs   Lab 11/27/24  0209 11/28/24  0250   WBC 6.56 9.61   HGB 7.5* 8.0*   HCT 23.4* 24.4*   PLT 30* 31*   MCV 77* 76*   RDW 23.2* 23.3*        Chemistries:  Recent Labs   Lab 11/27/24  0209 11/27/24  1327 11/27/24  1412 11/27/24  2122 11/28/24  0250   *  133* 128* 132* 133*  133* 133*   K 4.0  4.0 3.5 3.7 3.6  3.6 3.5     104 93* 103 103  103 105   CO2 20*  20* 18* 20* 19*  19* 18*   BUN 18  18 20 23 24*  24* 18   CREATININE 2.6*  2.6* 3.4* 3.6* 3.9*  3.9* 3.0*   CALCIUM 8.5*  8.4* 7.9* 8.7 8.7  8.7 8.0*   ALBUMIN 3.2*  3.2* 2.8* 3.0* 3.0*  3.0* 3.0*   PROT 6.8  --   --   --  6.2   BILITOT 3.4*  --   --   --  3.2*   ALKPHOS 44  --   --   --  42   ALT 10  --   --   --  9*   AST 13  --   --   --  12   MG 2.0  2.1 1.9  --  2.1  2.1 1.9   PHOS 2.9  2.8 3.6 4.0 4.1  4.1 3.1       All  pertinent labs within the past 24 hours have been reviewed.    Significant Imaging:  I have reviewed all pertinent imaging results/findings within the past 24 hours.

## 2024-11-28 NOTE — PROGRESS NOTES
[facility] - Medical ICU  Nephrology  Progress Note    Patient Name: Juan Carlos Yoo Sr.  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 Recent admission 10/4 at [facility] for decompensated hepatic cirrhosis where he was discharged with oral diuretic regimen including furosemide 60 mg BID and metolazone 2.5 mg daily, low salt diet with fluid restriction. It is unclear if patient is adherent to these medications or lifestyle modifications. W/u notable for anemia with hb 6.7 s/p 1 unit pRBC 11/20 and JAE on CKD w elevated BUN 49/creatinine 5.9, hyperkalemia (K 6.1>5.1 after shifting), bicarb 18, elevated lactate up to 3.5. Nephrology consulted for JAE with c/o HRS    Interval History: NAEON. Tolerated SLED overnight. Renal function did not show any response to higher MAP goals.     Review of patient's allergies indicates:  No Known Allergies  Current Facility-Administered Medications   Medication Frequency    0.9%  NaCl infusion (CRRT USE ONLY) Continuous    0.9%  NaCl infusion (for blood administration) Q24H PRN    albumin human 25% bottle 12.5 g Q6H    albuterol-ipratropium 2.5 mg-0.5 mg/3 mL nebulizer solution 3 mL Q6H PRN    aluminum-magnesium hydroxide-simethicone 200-200-20 mg/5 mL suspension 30 mL QID PRN    dextrose 10% bolus 125 mL 125 mL PRN    dextrose 10% bolus 250 mL 250 mL PRN    glucagon (human recombinant) injection 1 mg PRN    glucose chewable tablet 16 g PRN    glucose chewable tablet 24 g PRN    lactulose  10 gram/15 mL enema solution (inpatient use) 200 g TID    lactulose 20 gram/30 mL solution Soln 30 g Q2H    magnesium sulfate 2g in water 50mL IVPB (premix) PRN    melatonin tablet 6 mg Nightly PRN    midodrine tablet 15 mg TID    naloxone 0.4 mg/mL injection 0.02 mg PRN    NORepinephrine bitartrate-NaCl 32 mg/250 mL (128 mcg/mL) infusion Continuous    ondansetron injection 4 mg Q8H PRN    pantoprazole injection 40 mg Daily    piperacillin-tazobactam (ZOSYN) 4.5 g in D5W 100 mL IVPB (MB+) Q12H    simethicone chewable tablet 80 mg QID PRN    sodium chloride 0.9% flush 10 mL Q12H PRN    sodium chloride 0.9% flush 10 mL PRN    sodium phosphate 20.01 mmol in D5W 250 mL IVPB PRN    sodium phosphate 30 mmol in D5W 250 mL IVPB PRN    sodium phosphate 39.99 mmol in D5W 250 mL IVPB PRN    tamsulosin 24 hr capsule 0.4 mg QHS       Objective:     Vital Signs (Most Recent):  Temp: 97.5 °F (36.4 °C) (11/28/24 0715)  Pulse: 108 (11/28/24 1200)  Resp: 12 (11/28/24 1200)  BP: (!) 91/51 (11/28/24 0900)  SpO2: (!) 94 % (11/28/24 1200) Vital Signs (24h Range):  Temp:  [96.5 °F (35.8 °C)-97.5 °F (36.4 °C)] 97.5 °F (36.4 °C)  Pulse:  [] 108  Resp:  [10-40] 12  SpO2:  [88 %-100 %] 94 %  BP: ()/(51-79) 91/51  Arterial Line BP: (104-131)/(41-59) 123/48     Weight: (!) 152 kg (335 lb 1.6 oz) (11/28/24 0536)  Body mass index is 44.21 kg/m².  Body surface area is 2.8 meters squared.    I/O last 3 completed shifts:  In: 5490.5 [P.O.:960; I.V.:3956.5; IV Piggyback:574]  Out: 7788 [Urine:145; Emesis/NG output:400; Other:7243]     Physical Exam  Vitals and nursing note reviewed.   Constitutional:       General: He is awake. He is not in acute distress.     Appearance: He is obese. He is ill-appearing. He is not toxic-appearing.   HENT:      Head: Normocephalic and atraumatic.   Eyes:      General: No scleral icterus.     Extraocular Movements: Extraocular movements intact.      Conjunctiva/sclera: Conjunctivae normal.    Cardiovascular:      Rate and Rhythm: Tachycardia present. Rhythm irregular.   Pulmonary:      Effort: Pulmonary effort is normal. No respiratory distress.   Abdominal:      General: There is distension.      Palpations: Abdomen is soft.      Tenderness: There is no abdominal tenderness. There is no guarding or rebound.   Musculoskeletal:         General: No swelling or tenderness.      Right lower leg: Edema present.      Left lower leg: Edema present.      Comments: 2+ pitting edema in bilateral lower extremities   Skin:     General: Skin is warm.      Coloration: Skin is not jaundiced.      Findings: Bruising present.   Neurological:      Mental Status: He is alert and oriented to person, place, and time.      Motor: No weakness.   Psychiatric:         Behavior: Behavior is slowed.        Significant Labs:  CBC:   Recent Labs   Lab 11/28/24  0250   WBC 9.61   RBC 3.22*   HGB 8.0*   HCT 24.4*   PLT 31*   MCV 76*   MCH 24.8*   MCHC 32.8     CMP:   Recent Labs   Lab 11/28/24 0250   *   CALCIUM 8.0*   ALBUMIN 3.0*   PROT 6.2   *   K 3.5   CO2 18*      BUN 18   CREATININE 3.0*   ALKPHOS 42   ALT 9*   AST 12   BILITOT 3.2*     All labs within the past 24 hours have been reviewed.     Significant Imaging:  All imaging within the past 24 hours have been reviewed.   Assessment/Plan:     Renal/  JAE (acute kidney injury)  JAE is likely due to pre-renal azotemia due to intravascular volume depletion secondary to decompensated hepatic cirrhosis with volume overload and acute tubular necrosis caused by hemodynamic instability, renal hypoperfusion, severe sepsis with GNR bacteremia. Initial presentation concerning for hepatorenal syndrome, transferred to MICU for vasopressors without success in reaching MAP goal 85-90 for treatment of HRS. Currently, patient with oligouric JAE more contributed by ATN as above.     Recommendations  -Plan for 8 hours nocturnal SLED for removal of uremic toxins and volume  control  -Recommend further goals of care discussion regarding current prognosis and liver transplant candidacy   -Avoid nephrotoxins and renally dose meds for GFR listed above  -Monitor urine output, serial BMP, and adjust therapy as needed  -Hemodialysis consent obtained & placed in patient chart        Thank you for your consult. I will follow-up with patient. Please contact us if you have any additional questions.    Koffi Zurita MD  Nephrology  [facility] - Medical ICU

## 2024-11-28 NOTE — PROGRESS NOTES
[facility] - Medical ICU  Critical Care Medicine  Progress Note    Patient Name: Jua nCarlos Yoo Sr.  MRN: [id number]  Admission Date: 11/20/2024  Hospital Length of Stay: 8 days  Code Status: Full Code  Attending Provider: Saad Rahman MD  Primary Care Provider: Nyla Rios FNP   Principal Problem: Decompensated cirrhosis    Subjective:     HPI:  Juan Carlos Yoo is a 69 male with PMH of alcoholic cirrhosis, esophageal varices, Afib on eliquis, and HTN who presents for c/o worsening diffuse swelling as well as R arm pain/erythema. He was recently hospitalized 1 month ago at [facility] for volume overload in the setting of decompensated cirrhosis. He was treated with IV diuresis and discharged with adjustment to his diuretics, currently on lasix 60mg BID and metolazone 2.5mg every other week as per family. Patient reports diffuse swelling of his upper and lower extremities in addition to distended abdomen and worsening dyspnea, which he believes is due to recent medication adjustment. Family states he is non-compliant with low sodium diet and fluid restriction. Family states he has been compliant with diuretics, but rom't taken eliquis for 3 days due to issue getting refill. He also reports scratching right arm on door frame 3-4 days ago which has become red and painful after wrapping in in bandage. He claims to be compliant with medications, but is a poor historian. He follows with hepatology, not currently active on transplant list. He states he hasn't consumed alcohol for at least 9 months. Has c/o chills, but denies fever, cough, nausea, vomiting, chest pain, dysuria, hematuria, blood in stool. Workup in ED remarkable for VS: T 97.6 HR 94 RR 22 BP 95/56 O2 sat 97% on RA. Hb 6.7, MCV 75, Plt 81, PT/INR 22.6/2.2, Na 128, K 6.1, BUN/Cr 49/5.7, Alb 2.8, AST/ALT 35/9, Ammonia 104, , Trop neg, LA 2.9, CXR: Cardiac size is enlarged similar to prior.  No large volume of pleural fluid noted although there is mild  blunting of the right costophrenic angle which may relate to a small amount of pleural fluid.  Suspected right basilar opacity, to be correlated clinically for infection. RUE venous doppler: No thrombus in central veins of the right upper extremity. Patient received vanc/zosyn and lasix 80mg IVP in ED and will be admitted to hospital medicine service for further management.     Pt was admitted to hospital medicine. Blood cultures positive for Gram-positive cocci and Gram-negative rods. ID has been consulted. S/p 2 units PRBC for Hemoglobin dropped 6.8 on 11/21. Pt was on Eliquis for atrial fibrillation prior to admission which was held.  Hepatology following patient's clinical course, but are not evaluating him for transplant at this time. Diuretics were held because of concern for HRS.  Patient was started on albumin and midodrine per Nephrology recommendations for HRS.  Renal function continued to worsen and recommendation per Nephrology to transfer to ICU for maintaining goal map over 85 on Levophed.  ICU was notified and patient to be assessed to be transferred to ICU.       Hospital/ICU Course:  69 y.o. male with history of EtOH use disorder, EtOH cirrhosis, HCC s/p y90, morbid obesity BMI 44, HTN, and Afib who presents with severe anasarca and JAE.      Appreciate hepatology and nephrology recommendations.  Diuretics were held because of concern for HRS.  Patient was started on albumin and midodrine per Nephrology recommendations for HRS.  Renal function continued to worsen and recommendation per Nephrology to transfer to ICU for maintaining goal map over 85 on Levophed.  ICU was notified and patient to be assessed to be transferred to ICU.     Patient also has Gram-positive cocci and Gram-negative rods in his blood now.  Possible sources could be got translocation and barrier related infection through skin as he has been significantly edematous and has been oozing.  Has been on vancomycin and Rocephin.  Id  was consulted this morning.  Repeat blood cultures were obtained.     Continues to have anemia.  Hemoglobin dropped on 11/20 and was given 1 unit of packed red blood cells.  Did not respond appropriately.  Hemoglobin dropped again to 6.8 on 11/21 and was given 1 unit of packed red blood cells.  Hemoglobin again on 11/22 is 7.2.  No reported melena or hematochezia.  Patient was on Eliquis for atrial fibrillation prior to admission which was held.  Given history of esophageal varices and portal hypertensive gastropathy whereas also could be component of slow bleeding, under production due to CKD and hemolysis while also with decompensated cirrhosis.  Started on Protonix IV b.i.d. and octreotide.     Also clarified with hepatology.  Given patient's current infection we will await ID recommendations however we will be challenging to consider transplant evaluation at this time.  Trend clinical course     Goal clarification with the patient and family.  Patient at this time wants to be full code and continue with Levophed in ICU.  They would consider discussion with palliative care in a day or so if things do not get better.     In MICU patient started on Levophed, however titration remained difficult given tachycardic response from the patient.     11/24 Trialysis and arterial lines placed overnight and currently on levo and norepi. SLED today.    11/25 Boo dc. Increased midodrine. Continue steroids until d/c pressors. Discussed with Nephrology about our concern for sustained use of pressors to achieve their MAP goals of 85. Will follow up tomorrow.     11/26 Platelets 48. Repeat 38 confirming thrombocytopenia. Concern for HIT. D/c heparin. Increased lactulose dosing. Bladder scan revealed urine in bladder. Boo placed. Off vaso. Continuing levo. Maintain MAP goals 80. PT/OT consulted.     11/27 MAP goal 75-80. Heparin antibody result pending.     11/28 Pt with abdominal pain, decreased bowel movements, emesis. Lactic  acid 2.9 and repeat pending. Less concerned for mesenteric ischemia and more of a concern was for SBO. NG tube placed with subsequent suction of 500mL fluid. Abdomen less distended after placement and pt subsequently had bowel movement. MAP Goal of 60 with plan to come off pressors entirely and switch to dialysis after permacath, as he is not  liver transplant eligible at this time.       Interval History/Significant Events: Nausea and abdominal pain overnight.     Review of Systems   Respiratory:  Negative for shortness of breath.    Cardiovascular:  Negative for chest pain.   Gastrointestinal:  Positive for abdominal distention, abdominal pain, constipation and vomiting.     Objective:     Vital Signs (Most Recent):  Temp: 97.5 °F (36.4 °C) (11/28/24 1200)  Pulse: 105 (11/28/24 1315)  Resp: 13 (11/28/24 1315)  BP: (!) 91/51 (11/28/24 0900)  SpO2: (!) 91 % (11/28/24 1315) Vital Signs (24h Range):  Temp:  [96.5 °F (35.8 °C)-97.5 °F (36.4 °C)] 97.5 °F (36.4 °C)  Pulse:  [] 105  Resp:  [10-40] 13  SpO2:  [88 %-100 %] 91 %  BP: ()/(51-79) 91/51  Arterial Line BP: ()/(41-59) 106/41   Weight: (!) 152 kg (335 lb 1.6 oz)  Body mass index is 44.21 kg/m².      Intake/Output Summary (Last 24 hours) at 11/28/2024 1358  Last data filed at 11/28/2024 1311  Gross per 24 hour   Intake 3740.38 ml   Output 5031 ml   Net -1290.62 ml          Physical Exam     Physical Exam  Constitutional:       General: He is not in acute distress.     Appearance: He is obese. He is ill-appearing (chronic). He is not toxic-appearing.   HENT:      Head: Normocephalic and atraumatic.   Eyes:    Extraocular Movements: Extraocular movements intact.     Conjunctiva/sclera: Conjunctivae normal.   Cardiovascular:      Rate and Rhythm: Normal rate. Regular rhythm     Heart sounds: Normal heart sounds.   Pulmonary:      Effort: Pulmonary effort is normal. No respiratory distress.      Breath sounds: No wheezing.   Abdominal:      General:  Bowel sounds are hypoactive. There is distension.      Palpations: Abdomen is firm.      Tenderness: There is no abdominal tenderness.      Hernia: A hernia (umbilical and L inguinal hernia) is present.   Musculoskeletal:         General: Normal range of motion.      Right upper arm: Swelling and edema present.      Left upper arm: Swelling and edema present.      Cervical back: Normal range of motion and neck supple.      Right lower leg: Edema present.      Left lower leg: Edema present.   Skin:     General: Skin is warm and dry.      Coloration: Skin is not jaundiced.      Findings: Erythema (RUE) present.   Neurological:      General: No focal deficit present.      Mental Status: He is alert and oriented to person, place, and time.  Psychiatric:         Attention and Perception: Normal       Behavior: Behavior normal.   Vents:     Lines/Drains/Airways       Central Venous Catheter Line  Duration             Trialysis (Dialysis) Catheter 11/24/24 0441 right internal jugular 4 days              Drain  Duration                  Urethral Catheter 11/26/24 1738 1 day         Drain/Device  11/28/24 0855 Left proximal <1 day         NG/OG Tube 11/28/24 1306 18 Fr. Left nostril <1 day              Arterial Line  Duration             Arterial Line 11/24/24 0317 Right Radial 4 days              Peripheral Intravenous Line  Duration                  Peripheral IV - Single Lumen 11/27/24 1101 20 G Anterior;Left Forearm 1 day                  Significant Labs:    CBC/Anemia Profile:  Recent Labs   Lab 11/27/24  0209 11/28/24  0250   WBC 6.56 9.61   HGB 7.5* 8.0*   HCT 23.4* 24.4*   PLT 30* 31*   MCV 77* 76*   RDW 23.2* 23.3*        Chemistries:  Recent Labs   Lab 11/27/24  0209 11/27/24  1327 11/27/24  1412 11/27/24  2122 11/28/24  0250   *  133* 128* 132* 133*  133* 133*   K 4.0  4.0 3.5 3.7 3.6  3.6 3.5     104 93* 103 103  103 105   CO2 20*  20* 18* 20* 19*  19* 18*   BUN 18  18 20 23 24*  24* 18  "  CREATININE 2.6*  2.6* 3.4* 3.6* 3.9*  3.9* 3.0*   CALCIUM 8.5*  8.4* 7.9* 8.7 8.7  8.7 8.0*   ALBUMIN 3.2*  3.2* 2.8* 3.0* 3.0*  3.0* 3.0*   PROT 6.8  --   --   --  6.2   BILITOT 3.4*  --   --   --  3.2*   ALKPHOS 44  --   --   --  42   ALT 10  --   --   --  9*   AST 13  --   --   --  12   MG 2.0  2.1 1.9  --  2.1  2.1 1.9   PHOS 2.9  2.8 3.6 4.0 4.1  4.1 3.1       All pertinent labs within the past 24 hours have been reviewed.    Significant Imaging:  I have reviewed all pertinent imaging results/findings within the past 24 hours.    ABG  No results for input(s): "PH", "PO2", "PCO2", "HCO3", "BE" in the last 168 hours.  Assessment/Plan:     Neuro  Encephalopathy, metabolic  AAOx4  Will assess for signs of encephalopathy     Cardiac/Vascular  Atrial fibrillation  Holding home Eliquis in the setting of recent low blood counts    Renal/  Stage 3a chronic kidney disease  --Nephrology following  --Strict intake and output  --Renally dose all medications  --Avoid nephrotoxic agents    JAE (acute kidney injury)  Baseline creatinine is 1.5    Cr trend 6.3 > 6.4     Plan  - JAE is worsening. Will continue current treatment  - Avoid nephrotoxins and renally dose meds for GFR listed above  - Monitor urine output, serial BMP, and adjust therapy as needed     - Etiology includes volume overload, obstruction, hypotension, possible HRS  - monitor peacock catheter and monitor strict I&O  - was started on midodrine and albumin.  Octreotide started 11/22/2024.  -appreciate nephrology recommendations and recommendation to give 1 dose of IV Lasix 120 mg.   - ICU consulted for Levo administration with MAP goal > 80 . Peripheral Levophed ordered, titration difficult due to tachycardic response, will continue attempting to meet MAP goal however this may be difficult given the patient has Afib.  - Stress dosing with Fludrocortisone and Hydrocortisone as HRS patients tend to have AI-- 11/27 d/c steroids      ID  Severe " "sepsis  This patient does have evidence of infective focus  My overall impression is sepsis.  Source: Abdominal  Antibiotics given-   Antibiotics (72h ago, onward)      Start     Stop Route Frequency Ordered    11/26/24 2300  piperacillin-tazobactam (ZOSYN) 4.5 g in D5W 100 mL IVPB (MB+)         -- IV Every 8 hours (non-standard times) 11/26/24 1522          Latest lactate reviewed-  No results for input(s): "LACTATE", "POCLAC" in the last 72 hours.    Organ dysfunction indicated by Acute kidney injury    Fluid challenge Contraindicated- Fluid bolus is contraindicated in this patient due to End Stage Liver Disease     Post- resuscitation assessment No - Post resuscitation assessment not needed     Source control achieved by: antibiotics    Hematology  Thrombocytopenia  --Daily CBC  --Transfuse for PLT<10k, or PLT<50K with active bleeding  --Monitor for signs and symptoms of bleeding    Coagulopathy  End Stage Liver Disease precipitated coagulopathy  -heparin d/c d/t thrombocytopenia    Endocrine  Hyponatremia  Likely dilutional secondary to end stage liver disease and HRS    Will monitor for S&S of hyponatremia and correct when clinically appropriate.     GI  * Decompensated cirrhosis  MELD 3.0: 32 at 11/28/2024  2:50 AM  MELD-Na: 30 at 11/28/2024  2:50 AM  Calculated from:  Serum Creatinine: 3 mg/dL at 11/28/2024  2:50 AM  Serum Sodium: 133 mmol/L at 11/28/2024  2:50 AM  Total Bilirubin: 3.2 mg/dL at 11/28/2024  2:50 AM  Serum Albumin: 3 g/dL at 11/28/2024  2:50 AM  INR(ratio): 2 at 11/27/2024  2:09 AM  Age at listing (hypothetical): 69 years  Sex: Male at 11/28/2024  2:50 AM    --Hepatology following, pt not transplant eligible at this time.             Critical Care Daily Checklist:     A: Awake: RASS Goal/Actual Goal:    Actual:     B: Spontaneous Breathing Trial Performed?     C: SAT & SBT Coordinated?  N/A                      D: Delirium: CAM-ICU     E: Early Mobility Performed? No   F: Feeding Goal:  "   Status:               Current Diet Order   Procedures    Diet Renal Fluid - 1500mL       Order Specific Question:   Fluid restriction:       Answer:   Fluid - 1500mL      AS: Analgesia/Sedation None    T: Thromboembolic Prophylaxis None (heparin d/c), consider argatraban tomorrow   H: HOB > 300 Yes   U: Stress Ulcer Prophylaxis (if needed) Pantoprazole 40 IV   G: Glucose Control stable   B: Bowel Function Yes, and NG placed 11/28   I: Indwelling Catheter (Lines & Boo) Necessity Peripheral IV, trialysis cath, A line   D: De-escalation of Antimicrobials/Pharmacotherapies Zosyn, levo     Plan for the day/ETD Titrate to MAP >60     Code Status:  Family/Goals of Care: Full Code  Will discuss GOC , as pt is not liver transplant cadididate       Critical secondary to hepatorenal syndrome      Critical care was time spent personally by me on the following activities: development of treatment plan with patient or surrogate and bedside caregivers, discussions with consultants, evaluation of patient's response to treatment, examination of patient, ordering and performing treatments and interventions, ordering and review of laboratory studies, ordering and review of radiographic studies, pulse oximetry, re-evaluation of patient's condition. This critical care time did not overlap with that of any other provider or involve time for any procedures.     Livier Macario MD  Critical Care Medicine  [facility] - Medical ICU

## 2024-11-28 NOTE — ASSESSMENT & PLAN NOTE
JAE is likely due to pre-renal azotemia due to intravascular volume depletion secondary to decompensated hepatic cirrhosis with volume overload and acute tubular necrosis caused by hemodynamic instability, renal hypoperfusion, severe sepsis with GNR bacteremia. Initial presentation concerning for hepatorenal syndrome, transferred to MICU for vasopressors without success in reaching MAP goal 85-90 for treatment of HRS. Currently, patient with oligouric JAE more contributed by ATN as above.     Recommendations  -Plan for 8 hours nocturnal SLED for removal of uremic toxins and volume control  -Recommend further goals of care discussion regarding current prognosis and liver transplant candidacy   -Avoid nephrotoxins and renally dose meds for GFR listed above  -Monitor urine output, serial BMP, and adjust therapy as needed  -Hemodialysis consent obtained & placed in patient chart

## 2024-11-29 LAB
ALBUMIN SERPL BCP-MCNC: 2.8 G/DL (ref 3.5–5.2)
ALBUMIN SERPL BCP-MCNC: 2.9 G/DL (ref 3.5–5.2)
ALP SERPL-CCNC: 35 U/L (ref 40–150)
ALP SERPL-CCNC: 36 U/L (ref 40–150)
ALT SERPL W/O P-5'-P-CCNC: 10 U/L (ref 10–44)
ALT SERPL W/O P-5'-P-CCNC: 9 U/L (ref 10–44)
ANION GAP SERPL CALC-SCNC: 7 MMOL/L (ref 8–16)
ANION GAP SERPL CALC-SCNC: 8 MMOL/L (ref 8–16)
ANION GAP SERPL CALC-SCNC: 8 MMOL/L (ref 8–16)
ANISOCYTOSIS BLD QL SMEAR: SLIGHT
ANISOCYTOSIS BLD QL SMEAR: SLIGHT
APTT PPP: 46.7 SEC (ref 21–32)
AST SERPL-CCNC: 14 U/L (ref 10–40)
AST SERPL-CCNC: 16 U/L (ref 10–40)
BACTERIA BLD CULT: NORMAL
BACTERIA BLD CULT: NORMAL
BASOPHILS # BLD AUTO: 0 K/UL (ref 0–0.2)
BASOPHILS # BLD AUTO: ABNORMAL K/UL (ref 0–0.2)
BASOPHILS NFR BLD: 0 % (ref 0–1.9)
BASOPHILS NFR BLD: 0 % (ref 0–1.9)
BILIRUB DIRECT SERPL-MCNC: 1.8 MG/DL (ref 0.1–0.3)
BILIRUB SERPL-MCNC: 2.9 MG/DL (ref 0.1–1)
BILIRUB SERPL-MCNC: 3 MG/DL (ref 0.1–1)
BUN SERPL-MCNC: 11 MG/DL (ref 8–23)
BUN SERPL-MCNC: 14 MG/DL (ref 8–23)
BUN SERPL-MCNC: 14 MG/DL (ref 8–23)
BUN SERPL-MCNC: 16 MG/DL (ref 8–23)
BUN SERPL-MCNC: 16 MG/DL (ref 8–23)
BURR CELLS BLD QL SMEAR: ABNORMAL
BURR CELLS BLD QL SMEAR: ABNORMAL
CALCIUM SERPL-MCNC: 7.8 MG/DL (ref 8.7–10.5)
CALCIUM SERPL-MCNC: 7.9 MG/DL (ref 8.7–10.5)
CHLORIDE SERPL-SCNC: 101 MMOL/L (ref 95–110)
CHLORIDE SERPL-SCNC: 101 MMOL/L (ref 95–110)
CHLORIDE SERPL-SCNC: 102 MMOL/L (ref 95–110)
CO2 SERPL-SCNC: 22 MMOL/L (ref 23–29)
CO2 SERPL-SCNC: 23 MMOL/L (ref 23–29)
CO2 SERPL-SCNC: 23 MMOL/L (ref 23–29)
CREAT SERPL-MCNC: 2 MG/DL (ref 0.5–1.4)
CREAT SERPL-MCNC: 2.5 MG/DL (ref 0.5–1.4)
CREAT SERPL-MCNC: 2.6 MG/DL (ref 0.5–1.4)
CREAT SERPL-MCNC: 2.6 MG/DL (ref 0.5–1.4)
CREAT SERPL-MCNC: 3 MG/DL (ref 0.5–1.4)
CREAT SERPL-MCNC: 3 MG/DL (ref 0.5–1.4)
DIFFERENTIAL METHOD BLD: ABNORMAL
DIFFERENTIAL METHOD BLD: ABNORMAL
EOSINOPHIL # BLD AUTO: 0.1 K/UL (ref 0–0.5)
EOSINOPHIL # BLD AUTO: ABNORMAL K/UL (ref 0–0.5)
EOSINOPHIL NFR BLD: 0 % (ref 0–8)
EOSINOPHIL NFR BLD: 1 % (ref 0–8)
ERYTHROCYTE [DISTWIDTH] IN BLOOD BY AUTOMATED COUNT: 23.2 % (ref 11.5–14.5)
ERYTHROCYTE [DISTWIDTH] IN BLOOD BY AUTOMATED COUNT: 23.4 % (ref 11.5–14.5)
EST. GFR  (NO RACE VARIABLE): 21.5 ML/MIN/1.73 M^2
EST. GFR  (NO RACE VARIABLE): 21.5 ML/MIN/1.73 M^2
EST. GFR  (NO RACE VARIABLE): 25.6 ML/MIN/1.73 M^2
EST. GFR  (NO RACE VARIABLE): 25.6 ML/MIN/1.73 M^2
EST. GFR  (NO RACE VARIABLE): 26.8 ML/MIN/1.73 M^2
EST. GFR  (NO RACE VARIABLE): 35 ML/MIN/1.73 M^2
GLUCOSE SERPL-MCNC: 108 MG/DL (ref 70–110)
GLUCOSE SERPL-MCNC: 99 MG/DL (ref 70–110)
HCT VFR BLD AUTO: 22.9 % (ref 40–54)
HCT VFR BLD AUTO: 23.3 % (ref 40–54)
HGB BLD-MCNC: 7.3 G/DL (ref 14–18)
HGB BLD-MCNC: 7.4 G/DL (ref 14–18)
HYPOCHROMIA BLD QL SMEAR: ABNORMAL
HYPOCHROMIA BLD QL SMEAR: ABNORMAL
IMM GRANULOCYTES # BLD AUTO: 0.04 K/UL (ref 0–0.04)
IMM GRANULOCYTES # BLD AUTO: ABNORMAL K/UL (ref 0–0.04)
IMM GRANULOCYTES NFR BLD AUTO: 0.5 % (ref 0–0.5)
IMM GRANULOCYTES NFR BLD AUTO: ABNORMAL % (ref 0–0.5)
LYMPHOCYTES # BLD AUTO: 0.3 K/UL (ref 1–4.8)
LYMPHOCYTES # BLD AUTO: ABNORMAL K/UL (ref 1–4.8)
LYMPHOCYTES NFR BLD: 3 % (ref 18–48)
LYMPHOCYTES NFR BLD: 3.7 % (ref 18–48)
MAGNESIUM SERPL-MCNC: 1.8 MG/DL (ref 1.6–2.6)
MAGNESIUM SERPL-MCNC: 2 MG/DL (ref 1.6–2.6)
MAGNESIUM SERPL-MCNC: 2.1 MG/DL (ref 1.6–2.6)
MAGNESIUM SERPL-MCNC: 2.1 MG/DL (ref 1.6–2.6)
MCH RBC QN AUTO: 24.7 PG (ref 27–31)
MCH RBC QN AUTO: 24.8 PG (ref 27–31)
MCHC RBC AUTO-ENTMCNC: 31.3 G/DL (ref 32–36)
MCHC RBC AUTO-ENTMCNC: 32.3 G/DL (ref 32–36)
MCV RBC AUTO: 77 FL (ref 82–98)
MCV RBC AUTO: 79 FL (ref 82–98)
MONOCYTES # BLD AUTO: 0.7 K/UL (ref 0.3–1)
MONOCYTES # BLD AUTO: ABNORMAL K/UL (ref 0.3–1)
MONOCYTES NFR BLD: 4 % (ref 4–15)
MONOCYTES NFR BLD: 7.9 % (ref 4–15)
MYELOCYTES NFR BLD MANUAL: 1 %
NEUTROPHILS # BLD AUTO: 7.2 K/UL (ref 1.8–7.7)
NEUTROPHILS # BLD AUTO: ABNORMAL K/UL (ref 1.8–7.7)
NEUTROPHILS NFR BLD: 86.9 % (ref 38–73)
NEUTROPHILS NFR BLD: 91 % (ref 38–73)
NEUTS BAND NFR BLD MANUAL: 1 %
NRBC BLD-RTO: 0 /100 WBC
NRBC BLD-RTO: 0 /100 WBC
OVALOCYTES BLD QL SMEAR: ABNORMAL
OVALOCYTES BLD QL SMEAR: ABNORMAL
PATH REV BLD -IMP: NORMAL
PATH REV BLD -IMP: NORMAL
PHOSPHATE SERPL-MCNC: 2.4 MG/DL (ref 2.7–4.5)
PHOSPHATE SERPL-MCNC: 4.3 MG/DL (ref 2.7–4.5)
PHOSPHATE SERPL-MCNC: 4.3 MG/DL (ref 2.7–4.5)
PHOSPHATE SERPL-MCNC: 4.6 MG/DL (ref 2.7–4.5)
PHOSPHATE SERPL-MCNC: 4.6 MG/DL (ref 2.7–4.5)
PLATELET # BLD AUTO: 23 K/UL (ref 150–450)
PLATELET # BLD AUTO: 27 K/UL (ref 150–450)
PLATELET BLD QL SMEAR: ABNORMAL
PLATELET BLD QL SMEAR: ABNORMAL
PMV BLD AUTO: ABNORMAL FL (ref 9.2–12.9)
PMV BLD AUTO: ABNORMAL FL (ref 9.2–12.9)
POIKILOCYTOSIS BLD QL SMEAR: SLIGHT
POIKILOCYTOSIS BLD QL SMEAR: SLIGHT
POLYCHROMASIA BLD QL SMEAR: ABNORMAL
POLYCHROMASIA BLD QL SMEAR: ABNORMAL
POTASSIUM SERPL-SCNC: 3.8 MMOL/L (ref 3.5–5.1)
POTASSIUM SERPL-SCNC: 4 MMOL/L (ref 3.5–5.1)
POTASSIUM SERPL-SCNC: 4 MMOL/L (ref 3.5–5.1)
POTASSIUM SERPL-SCNC: 4.1 MMOL/L (ref 3.5–5.1)
POTASSIUM SERPL-SCNC: 4.1 MMOL/L (ref 3.5–5.1)
PROT SERPL-MCNC: 5.5 G/DL (ref 6–8.4)
PROT SERPL-MCNC: 5.7 G/DL (ref 6–8.4)
RBC # BLD AUTO: 2.94 M/UL (ref 4.6–6.2)
RBC # BLD AUTO: 2.99 M/UL (ref 4.6–6.2)
SCHISTOCYTES BLD QL SMEAR: ABNORMAL
SCHISTOCYTES BLD QL SMEAR: ABNORMAL
SCHISTOCYTES BLD QL SMEAR: PRESENT
SCHISTOCYTES BLD QL SMEAR: PRESENT
SODIUM SERPL-SCNC: 131 MMOL/L (ref 136–145)
SODIUM SERPL-SCNC: 132 MMOL/L (ref 136–145)
SODIUM SERPL-SCNC: 132 MMOL/L (ref 136–145)
SPHEROCYTES BLD QL SMEAR: ABNORMAL
SPHEROCYTES BLD QL SMEAR: ABNORMAL
TARGETS BLD QL SMEAR: ABNORMAL
WBC # BLD AUTO: 10.88 K/UL (ref 3.9–12.7)
WBC # BLD AUTO: 8.31 K/UL (ref 3.9–12.7)

## 2024-11-29 PROCEDURE — 80053 COMPREHEN METABOLIC PANEL: CPT

## 2024-11-29 PROCEDURE — 99291 CRITICAL CARE FIRST HOUR: CPT | Mod: ,,, | Performed by: INTERNAL MEDICINE

## 2024-11-29 PROCEDURE — 63600175 PHARM REV CODE 636 W HCPCS: Performed by: INTERNAL MEDICINE

## 2024-11-29 PROCEDURE — 63600175 PHARM REV CODE 636 W HCPCS: Performed by: STUDENT IN AN ORGANIZED HEALTH CARE EDUCATION/TRAINING PROGRAM

## 2024-11-29 PROCEDURE — 80069 RENAL FUNCTION PANEL: CPT | Mod: 91 | Performed by: STUDENT IN AN ORGANIZED HEALTH CARE EDUCATION/TRAINING PROGRAM

## 2024-11-29 PROCEDURE — 25000003 PHARM REV CODE 250: Performed by: STUDENT IN AN ORGANIZED HEALTH CARE EDUCATION/TRAINING PROGRAM

## 2024-11-29 PROCEDURE — 99900035 HC TECH TIME PER 15 MIN (STAT)

## 2024-11-29 PROCEDURE — 63600175 PHARM REV CODE 636 W HCPCS

## 2024-11-29 PROCEDURE — 25000003 PHARM REV CODE 250: Performed by: INTERNAL MEDICINE

## 2024-11-29 PROCEDURE — 90945 DIALYSIS ONE EVALUATION: CPT

## 2024-11-29 PROCEDURE — 99233 SBSQ HOSP IP/OBS HIGH 50: CPT | Mod: ,,, | Performed by: INTERNAL MEDICINE

## 2024-11-29 PROCEDURE — 85025 COMPLETE CBC W/AUTO DIFF WBC: CPT

## 2024-11-29 PROCEDURE — 25000003 PHARM REV CODE 250

## 2024-11-29 PROCEDURE — 80076 HEPATIC FUNCTION PANEL: CPT

## 2024-11-29 PROCEDURE — 84100 ASSAY OF PHOSPHORUS: CPT

## 2024-11-29 PROCEDURE — 83735 ASSAY OF MAGNESIUM: CPT | Mod: 91 | Performed by: STUDENT IN AN ORGANIZED HEALTH CARE EDUCATION/TRAINING PROGRAM

## 2024-11-29 PROCEDURE — 82565 ASSAY OF CREATININE: CPT

## 2024-11-29 PROCEDURE — 85025 COMPLETE CBC W/AUTO DIFF WBC: CPT | Mod: 91

## 2024-11-29 PROCEDURE — 83735 ASSAY OF MAGNESIUM: CPT

## 2024-11-29 PROCEDURE — 20000000 HC ICU ROOM

## 2024-11-29 PROCEDURE — 85060 BLOOD SMEAR INTERPRETATION: CPT | Mod: ,,, | Performed by: PATHOLOGY

## 2024-11-29 PROCEDURE — 85730 THROMBOPLASTIN TIME PARTIAL: CPT

## 2024-11-29 PROCEDURE — 27000221 HC OXYGEN, UP TO 24 HOURS

## 2024-11-29 PROCEDURE — 94761 N-INVAS EAR/PLS OXIMETRY MLT: CPT

## 2024-11-29 RX ORDER — MAGNESIUM SULFATE HEPTAHYDRATE 40 MG/ML
2 INJECTION, SOLUTION INTRAVENOUS
Status: DISPENSED | OUTPATIENT
Start: 2024-11-29 | End: 2024-11-30

## 2024-11-29 RX ORDER — MEROPENEM 1 G/1
1 INJECTION, POWDER, FOR SOLUTION INTRAVENOUS
Status: DISCONTINUED | OUTPATIENT
Start: 2024-11-29 | End: 2024-12-01

## 2024-11-29 RX ORDER — NOREPINEPHRINE BITARTRATE 0.02 MG/ML
0-3 INJECTION, SOLUTION INTRAVENOUS CONTINUOUS
Status: DISCONTINUED | OUTPATIENT
Start: 2024-11-29 | End: 2024-12-09

## 2024-11-29 RX ORDER — ARGATROBAN 1 MG/ML
0-10 INJECTION INTRAVENOUS CONTINUOUS
Status: DISCONTINUED | OUTPATIENT
Start: 2024-11-29 | End: 2024-11-29

## 2024-11-29 RX ORDER — HYDROCODONE BITARTRATE AND ACETAMINOPHEN 500; 5 MG/1; MG/1
TABLET ORAL CONTINUOUS
Status: ACTIVE | OUTPATIENT
Start: 2024-11-29 | End: 2024-11-30

## 2024-11-29 RX ORDER — LACTULOSE 10 G/15ML
30 SOLUTION ORAL 3 TIMES DAILY
Status: DISCONTINUED | OUTPATIENT
Start: 2024-11-29 | End: 2024-12-31 | Stop reason: HOSPADM

## 2024-11-29 RX ADMIN — PANTOPRAZOLE SODIUM 40 MG: 40 INJECTION, POWDER, LYOPHILIZED, FOR SOLUTION INTRAVENOUS at 08:11

## 2024-11-29 RX ADMIN — MIDODRINE HYDROCHLORIDE 15 MG: 5 TABLET ORAL at 08:11

## 2024-11-29 RX ADMIN — TAMSULOSIN HYDROCHLORIDE 0.4 MG: 0.4 CAPSULE ORAL at 08:11

## 2024-11-29 RX ADMIN — LACTULOSE 30 G: 20 SOLUTION ORAL at 08:11

## 2024-11-29 RX ADMIN — SODIUM CHLORIDE: 9 INJECTION, SOLUTION INTRAVENOUS at 06:11

## 2024-11-29 RX ADMIN — LACTULOSE 200 G: 10 SOLUTION ORAL at 05:11

## 2024-11-29 RX ADMIN — MEROPENEM 1 G: 1 INJECTION INTRAVENOUS at 04:11

## 2024-11-29 RX ADMIN — LACTULOSE 30 G: 20 SOLUTION ORAL at 02:11

## 2024-11-29 RX ADMIN — MIDODRINE HYDROCHLORIDE 15 MG: 5 TABLET ORAL at 03:11

## 2024-11-29 RX ADMIN — SODIUM CHLORIDE: 9 INJECTION, SOLUTION INTRAVENOUS at 11:11

## 2024-11-29 RX ADMIN — SODIUM PHOSPHATE, MONOBASIC, MONOHYDRATE AND SODIUM PHOSPHATE, DIBASIC, ANHYDROUS 30 MMOL: 142; 276 INJECTION, SOLUTION INTRAVENOUS at 04:11

## 2024-11-29 RX ADMIN — MIDODRINE HYDROCHLORIDE 15 MG: 5 TABLET ORAL at 10:11

## 2024-11-29 RX ADMIN — NOREPINEPHRINE BITARTRATE 0.02 MCG/KG/MIN: 16 SOLUTION INTRAVENOUS at 12:11

## 2024-11-29 RX ADMIN — SODIUM CHLORIDE: 9 INJECTION, SOLUTION INTRAVENOUS at 12:11

## 2024-11-29 RX ADMIN — PIPERACILLIN SODIUM AND TAZOBACTAM SODIUM 4.5 G: 4; .5 INJECTION, POWDER, FOR SOLUTION INTRAVENOUS at 05:11

## 2024-11-29 RX ADMIN — MAGNESIUM SULFATE HEPTAHYDRATE 2 G: 40 INJECTION, SOLUTION INTRAVENOUS at 04:11

## 2024-11-29 NOTE — ASSESSMENT & PLAN NOTE
Baseline creatinine is 1.5    Cr trend 6.3 > 6.4     Plan  -- Stress dosing with Fludrocortisone and Hydrocortisone as HRS patients tend to have AI-- 11/27 d/c steroids  --appreciate nephrology recommendations continuing dialysis.   - 11/29 MAP goal > 60. On .01 levo today

## 2024-11-29 NOTE — PT/OT/SLP PROGRESS
Physical Therapy      Patient Name:  Juan Carlos Yoo .   MRN:  [id number]    Patient not seen today secondary to  (pt on hold per RN due to constant diarrhea and NG suction in place.). Will follow-up at a later date.  11/29/2024  .

## 2024-11-29 NOTE — SUBJECTIVE & OBJECTIVE
Interval History/Significant Events: naeon    Review of Systems  Objective:     Vital Signs (Most Recent):  Temp: 97.7 °F (36.5 °C) (11/29/24 1130)  Pulse: 105 (11/29/24 1315)  Resp: 20 (11/29/24 1315)  BP: (!) 90/50 (11/29/24 1211)  SpO2: 97 % (11/29/24 1315) Vital Signs (24h Range):  Temp:  [96.9 °F (36.1 °C)-97.7 °F (36.5 °C)] 97.7 °F (36.5 °C)  Pulse:  [] 105  Resp:  [10-28] 20  SpO2:  [89 %-100 %] 97 %  BP: (90)/(50) 90/50  Arterial Line BP: ()/(38-67) 117/43   Weight: (!) 150.2 kg (331 lb 2.1 oz)  Body mass index is 43.69 kg/m².      Intake/Output Summary (Last 24 hours) at 11/29/2024 1401  Last data filed at 11/29/2024 1035  Gross per 24 hour   Intake 2327.3 ml   Output 2771 ml   Net -443.7 ml          Physical Exam     Constitutional:       General: He is not in acute distress.     Appearance: He is obese. He is ill-appearing (chronic). He is not toxic-appearing.   HENT:      Head: Normocephalic and atraumatic.   Eyes:    Extraocular Movements: Extraocular movements intact.     Conjunctiva/sclera: Conjunctivae normal.   Cardiovascular:      Rate and Rhythm: Normal rate. Regular rhythm     Heart sounds: Normal heart sounds.   Pulmonary:      Effort: Pulmonary effort is normal. No respiratory distress.      Breath sounds: No wheezing.   Abdominal:      General: Bowel sounds are hypoactive. There is distension.      Palpations: Abdomen is firm.      Tenderness: There is no abdominal tenderness.      Hernia: A hernia (umbilical and L inguinal hernia) is present.   Musculoskeletal:         General: Normal range of motion.      Right upper arm: Swelling and edema present.      Left upper arm: Swelling and edema present.      Cervical back: Normal range of motion and neck supple.      Right lower leg: Edema present.      Left lower leg: Edema present.   Skin:     General: Skin is warm and dry.      Coloration: Skin is not jaundiced.      Findings: Erythema (RUE) present.   Neurological:      General: No  focal deficit present.      Mental Status: He is alert and oriented to person, place, and time.  Psychiatric:         Attention and Perception: Normal       Behavior: Behavior normal.   Vents:     Lines/Drains/Airways       Central Venous Catheter Line  Duration             Trialysis (Dialysis) Catheter 11/24/24 0441 right internal jugular 5 days              Drain  Duration                  Urethral Catheter 11/26/24 1738 2 days         NG/OG Tube 11/28/24 1306 18 Fr. Left nostril 1 day              Arterial Line  Duration             Arterial Line 11/24/24 0317 Right Radial 5 days              Peripheral Intravenous Line  Duration                  Peripheral IV - Single Lumen 11/27/24 1101 20 G Anterior;Left Forearm 2 days                  Significant Labs:    CBC/Anemia Profile:  Recent Labs   Lab 11/28/24  0250 11/29/24  0306 11/29/24  1034   WBC 9.61 10.88 8.31   HGB 8.0* 7.4* 7.3*   HCT 24.4* 22.9* 23.3*   PLT 31* 27* 23*   MCV 76* 77* 79*   RDW 23.3* 23.4* 23.2*        Chemistries:  Recent Labs   Lab 11/28/24  0250 11/28/24  1345 11/28/24  2152 11/29/24  0306 11/29/24  1034   * 135* 133* 131*  --    K 3.5 3.8 3.9 3.8  --     105 104 102  --    CO2 18* 19* 22* 22*  --    BUN 18 21 17 11  --    CREATININE 3.0* 3.6* 2.9* 2.0* 2.5*   CALCIUM 8.0* 8.2* 7.9* 7.8*  --    ALBUMIN 3.0* 2.9* 2.9* 2.9* 2.8*   PROT 6.2  --   --  5.7* 5.5*   BILITOT 3.2*  --   --  3.0* 2.9*   ALKPHOS 42  --   --  36* 35*   ALT 9*  --   --  9* 10   AST 12  --   --  14 16   MG 1.9 2.3 2.1 1.8  --    PHOS 3.1 4.2 3.3 2.4*  --        All pertinent labs within the past 24 hours have been reviewed.    Significant Imaging:  I have reviewed all pertinent imaging results/findings within the past 24 hours.

## 2024-11-29 NOTE — PROGRESS NOTES
Pt to run 8H of SLED    UF rate started at 200    UF goal rate of 300 as pt tolerates to keep I/O even     11/28/24 2014   Treatment   Treatment Type SLED   Treatment Status Restart   Dialysis Machine Number [id number]   Dialyzer Time (hours) 0   BVP (Liters) 0 L   Solutions Labeled and Current  Yes   Access Temporary Cath;Right;IJ   Catheter Dressing Intact  Yes   Alarms Engaged Yes   CRRT Comments SLED started per MD orders   Prescription   Time (Hours) 8   Dialysate K + (mEq/L) 4   Dialysate CA + (mEq/L) 2.25   Dialysate HCO3 - (Bicarb) (mEq/L) 30   Dialysate Na + (mEq/L) 135   Cartridge Type   (R300)   Dialysate Flow Rate (mL/min) 200   UF Goal Rate 300 mL/hr   CRRT Hourly Documentation   Blood Flow (mL/min) 150   UF Rate 200 cc/hr   Arterial Pressure (mmHg) -40 mmHg   Venous Pressure (mmHg) 40 mmHg   Effluent Pressure (EP) (mmHg) 40 mmHg   Total UF (Hourly Cleared) (mL) 0

## 2024-11-29 NOTE — PT/OT/SLP PROGRESS
Occupational Therapy      Patient Name:  Juan Carlos BROOKS Fredo Julian.   MRN:  [id number]    Patient not seen today secondary to on hold per RN due to continuous diarrhea and need for continuous NG suction  . Will follow-up at a later date.    11/29/2024

## 2024-11-29 NOTE — SUBJECTIVE & OBJECTIVE
Interval History: NAEON. Tolerated SLED overnight, required levophed during session. Off now, maintained MAP goal > 65 with occasional measurements lower. Patient with no new complaints.     Review of patient's allergies indicates:  No Known Allergies  Current Facility-Administered Medications   Medication Frequency    0.9%  NaCl infusion (CRRT USE ONLY) Continuous    0.9%  NaCl infusion (for blood administration) Q24H PRN    albuterol-ipratropium 2.5 mg-0.5 mg/3 mL nebulizer solution 3 mL Q6H PRN    aluminum-magnesium hydroxide-simethicone 200-200-20 mg/5 mL suspension 30 mL QID PRN    argatroban in sodium chloride 0.9% (conc: 1 mg/mL) Continuous    dextrose 10% bolus 125 mL 125 mL PRN    dextrose 10% bolus 250 mL 250 mL PRN    glucagon (human recombinant) injection 1 mg PRN    glucose chewable tablet 16 g PRN    glucose chewable tablet 24 g PRN    lactulose 20 gram/30 mL solution Soln 30 g TID    magnesium sulfate 2g in water 50mL IVPB (premix) PRN    melatonin tablet 6 mg Nightly PRN    meropenem 2 g in 0.9% NaCl 100 mL IVPB (MB+) Q12H    midodrine tablet 15 mg TID    naloxone 0.4 mg/mL injection 0.02 mg PRN    NORepinephrine 4 mg in sodium chloride 0.9% 250 mL infusion (premix) Continuous    ondansetron injection 4 mg Q8H PRN    pantoprazole injection 40 mg Daily    simethicone chewable tablet 80 mg QID PRN    sodium chloride 0.9% flush 10 mL Q12H PRN    sodium chloride 0.9% flush 10 mL PRN    sodium phosphate 20.01 mmol in D5W 250 mL IVPB PRN    sodium phosphate 30 mmol in D5W 250 mL IVPB PRN    sodium phosphate 39.99 mmol in D5W 250 mL IVPB PRN    tamsulosin 24 hr capsule 0.4 mg QHS       Objective:     Vital Signs (Most Recent):  Temp: 97.2 °F (36.2 °C) (11/29/24 0345)  Pulse: 97 (11/29/24 1015)  Resp: 12 (11/29/24 1015)  BP: (!) 91/51 (11/28/24 0900)  SpO2: 96 % (11/29/24 1015) Vital Signs (24h Range):  Temp:  [96.9 °F (36.1 °C)-97.6 °F (36.4 °C)] 97.2 °F (36.2 °C)  Pulse:  [] 97  Resp:  [10-28]  12  SpO2:  [89 %-100 %] 96 %  Arterial Line BP: ()/(39-67) 109/42     Weight: (!) 150.2 kg (331 lb 2.1 oz) (11/29/24 0400)  Body mass index is 43.69 kg/m².  Body surface area is 2.78 meters squared.    I/O last 3 completed shifts:  In: 4993.6 [P.O.:240; I.V.:3823.6; IV Piggyback:930]  Out: 7096 [Urine:110; Emesis/NG output:700; Drains:700; Other:5586]     Physical Exam  Vitals and nursing note reviewed.   Constitutional:       General: He is awake. He is not in acute distress.     Appearance: He is obese. He is ill-appearing. He is not toxic-appearing.   HENT:      Head: Normocephalic and atraumatic.   Eyes:      General: No scleral icterus.     Extraocular Movements: Extraocular movements intact.      Conjunctiva/sclera: Conjunctivae normal.   Cardiovascular:      Rate and Rhythm: Tachycardia present. Rhythm irregular.   Pulmonary:      Effort: Pulmonary effort is normal. No respiratory distress.   Abdominal:      General: There is distension.      Palpations: Abdomen is soft.      Tenderness: There is no abdominal tenderness. There is no guarding or rebound.   Musculoskeletal:         General: No swelling or tenderness.      Right lower leg: Edema present.      Left lower leg: Edema present.      Comments: 2+ pitting edema in bilateral lower extremities   Skin:     General: Skin is warm.      Coloration: Skin is not jaundiced.      Findings: Bruising present.   Neurological:      Mental Status: He is alert and oriented to person, place, and time.      Motor: No weakness.   Psychiatric:         Behavior: Behavior is slowed.        Significant Labs:  CBC:   Recent Labs   Lab 11/29/24  1034   WBC 8.31   RBC 2.94*   HGB 7.3*   HCT 23.3*   PLT 23*   MCV 79*   MCH 24.8*   MCHC 31.3*     CMP:   Recent Labs   Lab 11/29/24  0306 11/29/24  1034   GLU 99  --    CALCIUM 7.8*  --    ALBUMIN 2.9* 2.8*   PROT 5.7* 5.5*   *  --    K 3.8  --    CO2 22*  --      --    BUN 11  --    CREATININE 2.0* 2.5*   ALKPHOS  36* 35*   ALT 9* 10   AST 14 16   BILITOT 3.0* 2.9*     All labs within the past 24 hours have been reviewed.     Significant Imaging:  All imaging within the past 24 hours have been reviewed.

## 2024-11-29 NOTE — PROGRESS NOTES
"[facility] - Medical ICU  Adult Nutrition  Progress Note    SUMMARY       Recommendations    1.) Recommend continuing with CLD- advancing per MD.     2.) Recommend Boost Breeze TID to help meet increased needs.      - once diet advances, may change to Boost Plus TID.    3.) RD to monitor wt, PO intake, skin, labs.     Goals: to meet % of EEN/EPN by next RD f/u  Nutrition Goal Status: new  Communication of RD Recs:  (POC)    Assessment and Plan    Nutrition Problem  Increased nutrient needs (protein)    Related to (etiology):   Increased demand for protein 2/2 acute renal failure    Signs and Symptoms (as evidenced by):   Need for CRRT     Interventions/Recommendations (treatment strategy):  Collaboration of nutritional care with other providers.   ONS    Nutrition Diagnosis Status:   New     Reason for Assessment    Reason For Assessment: length of stay  Diagnosis: liver disease (Decompensated cirrhosis)    General Information Comments: Pt was seen for LOS. PMHx:  alcoholic cirrhosis, esophageal varices, Afib, HTN. Pt states that appetite was fair to good, pending on abdominal ascites. Pt is currently receiving intermittent CRRT. Pt is not a candidate for liver tx at this time. NGT present, with bilious output. Pt's diet was downgraded to CLD today. Pt stated last meal was on Wednesday for lunch. Pt is at risk for malnutrition. RD team to continue to monitor and f/u.     Nutrition Discharge Planning: Regular Diet, fluid per MD    Nutrition Related Social Determinants of Health: SDOH: None Identified     Nutrition Risk Screen    Nutrition Risk Screen: no indicators present    Nutrition/Diet History    Patient Reported Diet/Restrictions/Preferences: general  Typical Food/Fluid Intake: 2 meals/day + snacks  Spiritual, Cultural Beliefs, Faith Practices, Values that Affect Care: no  Food Allergies: NKFA    Anthropometrics    Temp: 97.7 °F (36.5 °C)  Height Method: Stated  Height: 6' 1" (185.4 cm)  Height " (inches): 73 in  Weight Method: Bed Scale  Weight: (!) 150.2 kg (331 lb 2.1 oz)  Weight (lb): (!) 331.13 lb  Ideal Body Weight (IBW), Male: 184 lb  % Ideal Body Weight, Male (lb): 183.32 %  BMI (Calculated): 43.7    Lab/Procedures/Meds    Pertinent Labs Reviewed: reviewed  Pertinent Labs Comments: Na: 131, Cr: 2.5, GFR: 26.8, ALP: 35, PRO: 5.5, tbili: 2.9    Pertinent Medications Reviewed: reviewed  Pertinent Medications Comments: Lactulose, pantoprazole, NaCl, argatroban, nor-epi    Estimated/Assessed Needs    Weight Used For Calorie Calculations: (!) 150.2 kg (331 lb 2.1 oz)  Energy Calorie Requirements (kcal): 2311 kcal  Energy Need Method: [facility]    Protein Requirements: 167- 184 (2.0-2.2g/kg of IBW)  Weight Used For Protein Calculations: 83.5 kg (184 lb) (IBW d/t BMI >40.0)    Fluid Requirements (mL): as per MD  RDA Method (mL): 2311    Nutrition Prescription Ordered    Current Diet Order: CLD    Evaluation of Received Nutrient/Fluid Intake    I/O: +6.1L since 11/22  Energy Calories Required: not meeting needs  Protein Required: not meeting needs  Fluid Required:  (as per MD)  Comments: LBM 11/29 (diarrhea)  Tolerance: tolerating  % Intake of Estimated Energy Needs: 0 - 25 %  % Meal Intake: Other: CLD    Nutrition Risk    Level of Risk/Frequency of Follow-up: low - moderate     Monitor and Evaluation    Food and Nutrient Intake: food and beverage intake  Food and Nutrient Adminstration: diet order  Anthropometric Measurements: weight, weight change, body mass index  Biochemical Data, Medical Tests and Procedures: electrolyte and renal panel, gastrointestinal profile, glucose/endocrine profile, lipid profile, inflammatory profile  Nutrition-Focused Physical Findings: overall appearance, skin     Nutrition Follow-Up    RD Follow-up?: Yes

## 2024-11-29 NOTE — PLAN OF CARE
MICU DAILY GOALS     Family/Goals of care/Code Status   Code Status: Full Code    24H Vital Sign Range  Temp:  [96.9 °F (36.1 °C)-97.6 °F (36.4 °C)]   Pulse:  []   Resp:  [10-28]   BP: ()/(51-60)   SpO2:  [88 %-100 %]   Arterial Line BP: ()/(39-55)      Shift Events (include procedures and significant events)   Lactulose enemas now every 6 hrs.Multiple(3) large loose stools throughout the shift    AWAKE RASS: Goal - RASS Goal: 0-->alert and calm  Actual - RASS (Pedraza Agitation-Sedation Scale): drowsy    Restraint necessity: Not necessary   BREATHE SBT: Not intubated    Coordinate A & B, analgesics/sedatives Pain: managed   SAT: Not intubated   Delirium CAM-ICU: Overall CAM-ICU: Negative   Early(intubated/ Progressive (non-intubated) Mobility MOVE Screen (INTUBATED ONLY): Not intubated    Activity: Activity Management: Rolling - L1   Feeding/Nutrition Diet order: Diet/Nutrition Received: consistent carb/diabetic diet, Specialty Diet/Nutrition Received: renal diet   Thrombus DVT prophylaxis: VTE Core Measure: (SCDs) Sequential compression device initiated/maintained   HOB Elevation Head of Bed (HOB) Positioning: HOB at 20-30 degrees   Ulcer Prophylaxis GI: yes   Glucose control managed Glycemic Management: blood glucose monitored   Skin Skin assessment:     Sacrum intact/not altered? Yes  Heels intact/not altered? Yes  Surgical wound? No    CHECK ONE!   (no altered skin or altered skin) and sub boxes:  [] No Altered Skin Integrity Present    []Prevention Measures Documented    [x] Altered Skin Integrity Present or Discovered   [x] LDA present in EPIC, daily doc completed              [] LDA added if not in EPIC (describe wound).                    When describing wound, do not stage, use descriptive words only.    [] Wound Image Taken (required on admit,                   transfer/discharge and every Tuesday)    Wound Care Consulted? No   Bowel Function Multiple loose stools due to lactulose  retention enemas     Indwelling Catheter Necessity      Urethral Catheter 11/26/24 1738-Reason for Continuing Urinary Catheterization: Urinary retention, Critically ill in ICU and requiring hourly monitoring of intake/output  [REMOVED]      Urethral Catheter 11/20/24 1802 Double-lumen-Reason for Continuing Urinary Catheterization: Urinary retention    Trialysis (Dialysis) Catheter 11/24/24 0441 right internal jugular-Line Necessity Review: CRRT/HD     De-escalation Antibiotics Yes        VS and assessment per flow sheet, patient progressing towards goals as tolerated, plan of care reviewed with Juan Carlos Yoo, all concerns addressed, will continue to monitor.

## 2024-11-29 NOTE — PROGRESS NOTES
[facility]  Critical Care Medicine  Progress Note    Patient Name: Juan Carlos Yoo Sr.  MRN: [id number]  Admission Date: 11/20/2024  Hospital Length of Stay: 9 days  Code Status: Full Code  Attending Provider: Saad Rahman MD  Primary Care Provider: Nyla Rios FNP   Principal Problem: Decompensated cirrhosis    Subjective:     HPI:  Juan Carlos Yoo is a 69 male with PMH of alcoholic cirrhosis, esophageal varices, Afib on eliquis, and HTN who presents for c/o worsening diffuse swelling as well as R arm pain/erythema. He was recently hospitalized 1 month ago at [facility] for volume overload in the setting of decompensated cirrhosis. He was treated with IV diuresis and discharged with adjustment to his diuretics, currently on lasix 60mg BID and metolazone 2.5mg every other week as per family. Patient reports diffuse swelling of his upper and lower extremities in addition to distended abdomen and worsening dyspnea, which he believes is due to recent medication adjustment. Family states he is non-compliant with low sodium diet and fluid restriction. Family states he has been compliant with diuretics, but rom't taken eliquis for 3 days due to issue getting refill. He also reports scratching right arm on door frame 3-4 days ago which has become red and painful after wrapping in in bandage. He claims to be compliant with medications, but is a poor historian. He follows with hepatology, not currently active on transplant list. He states he hasn't consumed alcohol for at least 9 months. Has c/o chills, but denies fever, cough, nausea, vomiting, chest pain, dysuria, hematuria, blood in stool. Workup in ED remarkable for VS: T 97.6 HR 94 RR 22 BP 95/56 O2 sat 97% on RA. Hb 6.7, MCV 75, Plt 81, PT/INR 22.6/2.2, Na 128, K 6.1, BUN/Cr 49/5.7, Alb 2.8, AST/ALT 35/9, Ammonia 104, , Trop neg, LA 2.9, CXR: Cardiac size is enlarged similar to prior.  No large volume of pleural fluid noted although there is mild  blunting of the right costophrenic angle which may relate to a small amount of pleural fluid.  Suspected right basilar opacity, to be correlated clinically for infection. RUE venous doppler: No thrombus in central veins of the right upper extremity. Patient received vanc/zosyn and lasix 80mg IVP in ED and will be admitted to hospital medicine service for further management.     Pt was admitted to hospital medicine. Blood cultures positive for Gram-positive cocci and Gram-negative rods. ID has been consulted. S/p 2 units PRBC for Hemoglobin dropped 6.8 on 11/21. Pt was on Eliquis for atrial fibrillation prior to admission which was held.  Hepatology following patient's clinical course, but are not evaluating him for transplant at this time. Diuretics were held because of concern for HRS.  Patient was started on albumin and midodrine per Nephrology recommendations for HRS.  Renal function continued to worsen and recommendation per Nephrology to transfer to ICU for maintaining goal map over 85 on Levophed.  ICU was notified and patient to be assessed to be transferred to ICU.       Hospital/ICU Course:  69 y.o. male with history of EtOH use disorder, EtOH cirrhosis, HCC s/p y90, morbid obesity BMI 44, HTN, and Afib who presents with severe anasarca and JAE.      Appreciate hepatology and nephrology recommendations.  Diuretics were held because of concern for HRS.  Patient was started on albumin and midodrine per Nephrology recommendations for HRS.  Renal function continued to worsen and recommendation per Nephrology to transfer to ICU for maintaining goal map over 85 on Levophed.  ICU was notified and patient to be assessed to be transferred to ICU.     Patient also has Gram-positive cocci and Gram-negative rods in his blood now.  Possible sources could be got translocation and barrier related infection through skin as he has been significantly edematous and has been oozing.  Has been on vancomycin and Rocephin.  Id  was consulted this morning.  Repeat blood cultures were obtained.     Continues to have anemia.  Hemoglobin dropped on 11/20 and was given 1 unit of packed red blood cells.  Did not respond appropriately.  Hemoglobin dropped again to 6.8 on 11/21 and was given 1 unit of packed red blood cells.  Hemoglobin again on 11/22 is 7.2.  No reported melena or hematochezia.  Patient was on Eliquis for atrial fibrillation prior to admission which was held.  Given history of esophageal varices and portal hypertensive gastropathy whereas also could be component of slow bleeding, under production due to CKD and hemolysis while also with decompensated cirrhosis.  Started on Protonix IV b.i.d. and octreotide.     Also clarified with hepatology.  Given patient's current infection we will await ID recommendations however we will be challenging to consider transplant evaluation at this time.  Trend clinical course     Goal clarification with the patient and family.  Patient at this time wants to be full code and continue with Levophed in ICU.  They would consider discussion with palliative care in a day or so if things do not get better.     In MICU patient started on Levophed, however titration remained difficult given tachycardic response from the patient.     11/24 Trialysis and arterial lines placed overnight and currently on levo and norepi. SLED today.    11/25 Boo dc. Increased midodrine. Continue steroids until d/c pressors. Discussed with Nephrology about our concern for sustained use of pressors to achieve their MAP goals of 85. Will follow up tomorrow.     11/26 Platelets 48. Repeat 38 confirming thrombocytopenia. Concern for HIT. D/c heparin. Increased lactulose dosing. Bladder scan revealed urine in bladder. Boo placed. Off vaso. Continuing levo. Maintain MAP goals 80. PT/OT consulted.     11/27 MAP goal 75-80. Heparin antibody result pending.     11/28 Pt with abdominal pain, decreased bowel movements, emesis. Lactic  acid 2.9 and repeat 2.7. Less concerned for mesenteric ischemia and more of a concern was for SBO. NG tube placed with subsequent suction of 500mL fluid. Abdomen less distended after placement and pt subsequently had bowel movement. MAP Goal of 60 with plan to come off pressors entirely and switch to dialysis after permacath, as he is not  liver transplant eligible at this time.     11/29 Pt with ileus. Clear liquids for now. Platelets 24. HIT versus zosyn induced? Peripheral smear sent. Zosyn changed to kaylah. Consent and transfuse 1U. GOC conversation today. Pt opting for long term dialysis.       Interval History/Significant Events: naeon    Review of Systems  Objective:     Vital Signs (Most Recent):  Temp: 97.7 °F (36.5 °C) (11/29/24 1130)  Pulse: 105 (11/29/24 1315)  Resp: 20 (11/29/24 1315)  BP: (!) 90/50 (11/29/24 1211)  SpO2: 97 % (11/29/24 1315) Vital Signs (24h Range):  Temp:  [96.9 °F (36.1 °C)-97.7 °F (36.5 °C)] 97.7 °F (36.5 °C)  Pulse:  [] 105  Resp:  [10-28] 20  SpO2:  [89 %-100 %] 97 %  BP: (90)/(50) 90/50  Arterial Line BP: ()/(38-67) 117/43   Weight: (!) 150.2 kg (331 lb 2.1 oz)  Body mass index is 43.69 kg/m².      Intake/Output Summary (Last 24 hours) at 11/29/2024 1401  Last data filed at 11/29/2024 1035  Gross per 24 hour   Intake 2327.3 ml   Output 2771 ml   Net -443.7 ml          Physical Exam     Constitutional:       General: He is not in acute distress.     Appearance: He is obese. He is ill-appearing (chronic). He is not toxic-appearing.   HENT:      Head: Normocephalic and atraumatic.   Eyes:    Extraocular Movements: Extraocular movements intact.     Conjunctiva/sclera: Conjunctivae normal.   Cardiovascular:      Rate and Rhythm: Normal rate. Regular rhythm     Heart sounds: Normal heart sounds.   Pulmonary:      Effort: Pulmonary effort is normal. No respiratory distress.      Breath sounds: No wheezing.   Abdominal:      General: Bowel sounds are hypoactive. There is  distension.      Palpations: Abdomen is firm.      Tenderness: There is no abdominal tenderness.      Hernia: A hernia (umbilical and L inguinal hernia) is present.   Musculoskeletal:         General: Normal range of motion.      Right upper arm: Swelling and edema present.      Left upper arm: Swelling and edema present.      Cervical back: Normal range of motion and neck supple.      Right lower leg: Edema present.      Left lower leg: Edema present.   Skin:     General: Skin is warm and dry.      Coloration: Skin is not jaundiced.      Findings: Erythema (RUE) present.   Neurological:      General: No focal deficit present.      Mental Status: He is alert and oriented to person, place, and time.  Psychiatric:         Attention and Perception: Normal       Behavior: Behavior normal.   Vents:     Lines/Drains/Airways       Central Venous Catheter Line  Duration             Trialysis (Dialysis) Catheter 11/24/24 0441 right internal jugular 5 days              Drain  Duration                  Urethral Catheter 11/26/24 1738 2 days         NG/OG Tube 11/28/24 1306 18 Fr. Left nostril 1 day              Arterial Line  Duration             Arterial Line 11/24/24 0317 Right Radial 5 days              Peripheral Intravenous Line  Duration                  Peripheral IV - Single Lumen 11/27/24 1101 20 G Anterior;Left Forearm 2 days                  Significant Labs:    CBC/Anemia Profile:  Recent Labs   Lab 11/28/24  0250 11/29/24  0306 11/29/24  1034   WBC 9.61 10.88 8.31   HGB 8.0* 7.4* 7.3*   HCT 24.4* 22.9* 23.3*   PLT 31* 27* 23*   MCV 76* 77* 79*   RDW 23.3* 23.4* 23.2*        Chemistries:  Recent Labs   Lab 11/28/24  0250 11/28/24  1345 11/28/24  2152 11/29/24  0306 11/29/24  1034   * 135* 133* 131*  --    K 3.5 3.8 3.9 3.8  --     105 104 102  --    CO2 18* 19* 22* 22*  --    BUN 18 21 17 11  --    CREATININE 3.0* 3.6* 2.9* 2.0* 2.5*   CALCIUM 8.0* 8.2* 7.9* 7.8*  --    ALBUMIN 3.0* 2.9* 2.9* 2.9* 2.8*  "  PROT 6.2  --   --  5.7* 5.5*   BILITOT 3.2*  --   --  3.0* 2.9*   ALKPHOS 42  --   --  36* 35*   ALT 9*  --   --  9* 10   AST 12  --   --  14 16   MG 1.9 2.3 2.1 1.8  --    PHOS 3.1 4.2 3.3 2.4*  --        All pertinent labs within the past 24 hours have been reviewed.    Significant Imaging:  I have reviewed all pertinent imaging results/findings within the past 24 hours.    ABG  No results for input(s): "PH", "PO2", "PCO2", "HCO3", "BE" in the last 168 hours.  Assessment/Plan:     Neuro  Encephalopathy, metabolic  AAOx4  Will assess for signs of encephalopathy     Cardiac/Vascular  Atrial fibrillation  Holding home Eliquis in the setting of recent low blood counts    Renal/  Stage 3a chronic kidney disease  --Nephrology following  --Strict intake and output  --Renally dose all medications  --Avoid nephrotoxic agents    JAE (acute kidney injury)  Baseline creatinine is 1.5    Cr trend 6.3 > 6.4     Plan  -- Stress dosing with Fludrocortisone and Hydrocortisone as HRS patients tend to have AI-- 11/27 d/c steroids  --appreciate nephrology recommendations continuing dialysis.   - 11/29 MAP goal > 60. On .01 levo today    ID  Severe sepsis  This patient does have evidence of infective focus  My overall impression is sepsis.  Source: Abdominal  Antibiotics given-   Antibiotics (72h ago, onward)      Start     Stop Route Frequency Ordered    11/29/24 1700  meropenem injection 1 g         -- IV Every 12 hours (non-standard times) 11/29/24 1304          Latest lactate reviewed-  Recent Labs   Lab 11/28/24  1345   LACTATE 2.7*       Organ dysfunction indicated by Acute kidney injury    Fluid challenge Contraindicated- Fluid bolus is contraindicated in this patient due to End Stage Liver Disease     Post- resuscitation assessment No - Post resuscitation assessment not needed     Source control achieved by: antibiotics    Hematology  Thrombocytopenia  --Daily CBC  --Transfuse for PLT<10k, or PLT<50K with active " bleeding  --Monitor for signs and symptoms of bleeding    Coagulopathy  End Stage Liver Disease precipitated coagulopathy  -heparin d/c d/t thrombocytopenia    Endocrine  Hyponatremia  Likely dilutional secondary to end stage liver disease and HRS    Will monitor for S&S of hyponatremia and correct when clinically appropriate.     GI  * Decompensated cirrhosis  MELD 3.0: 32 at 11/28/2024  2:50 AM  MELD-Na: 30 at 11/28/2024  2:50 AM  Calculated from:  Serum Creatinine: 3 mg/dL at 11/28/2024  2:50 AM  Serum Sodium: 133 mmol/L at 11/28/2024  2:50 AM  Total Bilirubin: 3.2 mg/dL at 11/28/2024  2:50 AM  Serum Albumin: 3 g/dL at 11/28/2024  2:50 AM  INR(ratio): 2 at 11/27/2024  2:09 AM  Age at listing (hypothetical): 69 years  Sex: Male at 11/28/2024  2:50 AM    --Hepatology following, pt not transplant eligible at this time.            Critical Care Daily Checklist:    A: Awake: RASS Goal/Actual Goal: RASS Goal: 0-->alert and calm  Actual:     B: Spontaneous Breathing Trial Performed?     C: SAT & SBT Coordinated?  N/a                      D: Delirium: CAM-ICU Overall CAM-ICU: Negative   E: Early Mobility Performed? Yes   F: Feeding Goal: Goals: to meet % of EEN/EPN by next RD f/u  Status: Nutrition Goal Status: new   Current Diet Order   Procedures    Diet Clear Liquid Standard Tray     Order Specific Question:   Tray type:     Answer:   Standard Tray      AS: Analgesia/Sedation none   T: Thromboembolic Prophylaxis none   H: HOB > 300 Yes   U: Stress Ulcer Prophylaxis (if needed) PPI   G: Glucose Control N/a   B: Bowel Function Stool Occurrence: 1   I: Indwelling Catheter (Lines & Boo) Necessity Boo, trialysis, A line, NG   D: De-escalation of Antimicrobials/Pharmacotherapies meropenem    Plan for the day/ETD GOC    Code Status:  Family/Goals of Care: Full Code         Critical secondary to hepatorenal syndrome      Critical care was time spent personally by me on the following activities: development of  treatment plan with patient or surrogate and bedside caregivers, discussions with consultants, evaluation of patient's response to treatment, examination of patient, ordering and performing treatments and interventions, ordering and review of laboratory studies, ordering and review of radiographic studies, pulse oximetry, re-evaluation of patient's condition. This critical care time did not overlap with that of any other provider or involve time for any procedures.     Livier Macario MD  Critical Care Medicine  [facility]

## 2024-11-29 NOTE — ASSESSMENT & PLAN NOTE
JAE is likely due to pre-renal azotemia due to intravascular volume depletion secondary to decompensated hepatic cirrhosis with volume overload and acute tubular necrosis caused by hemodynamic instability, renal hypoperfusion, severe sepsis with GNR bacteremia. Initial presentation concerning for hepatorenal syndrome, transferred to MICU for vasopressors without success in reaching MAP goal 85-90 for treatment of HRS. Currently, patient with oligouric JAE more contributed by ATN as above.     Recommendations:  -Plan for 6 hours SCUF followed by 6 hours SLED tonight for removal of uremic toxins and volume control  -Recommend further goals of care discussion regarding current prognosis and liver transplant candidacy   -Anticipate need for tunneled hemodialysis catheter placement pending C discussions, whether or not patient would need long term hemodialysis, and if bacteremia cleared  -Avoid nephrotoxins and renally dose meds for GFR listed above  -Monitor urine output, serial BMP, and adjust therapy as needed  -Hemodialysis consent obtained & placed in patient chart

## 2024-11-29 NOTE — PROGRESS NOTES
[facility]  Nephrology  Progress Note    Patient Name: Juan Carlos Yoo Sr.  MRN: [id number]  Admission Date: 11/20/2024  Hospital Length of Stay: 9 days  Attending Provider: Saad Rahman MD   Primary Care Physician: Nyla Rios FNP  Principal Problem:Decompensated cirrhosis    Subjective:     HPI: Juan Carlos Yoo is a 69 year old male with hx of decompensated hepatic cirrhosis due to alcohol use, HCC s/p Y90, esophageal varices, persistent afib on eliquis, HTN, CKD3a (baseline creatinine 1.2-1.4) admitted on 11/20 for sepsis likely 2/2 SSTI involving RUE and decompensated hepatic cirrhosis with signs of volume overload. Recent admission 10/4 at [facility] for decompensated hepatic cirrhosis where he was discharged with oral diuretic regimen including furosemide 60 mg BID and metolazone 2.5 mg daily, low salt diet with fluid restriction. It is unclear if patient is adherent to these medications or lifestyle modifications. W/u notable for anemia with hb 6.7 s/p 1 unit pRBC 11/20 and JAE on CKD w elevated BUN 49/creatinine 5.9, hyperkalemia (K 6.1>5.1 after shifting), bicarb 18, elevated lactate up to 3.5. Nephrology consulted for JAE with c/o HRS    Interval History: NAEON. Tolerated SLED overnight, required levophed during session. Off now, maintained MAP goal > 65 with occasional measurements lower. Patient with no new complaints.     Review of patient's allergies indicates:  No Known Allergies  Current Facility-Administered Medications   Medication Frequency    0.9%  NaCl infusion (CRRT USE ONLY) Continuous    0.9%  NaCl infusion (for blood administration) Q24H PRN    albuterol-ipratropium 2.5 mg-0.5 mg/3 mL nebulizer solution 3 mL Q6H PRN    aluminum-magnesium hydroxide-simethicone 200-200-20 mg/5 mL suspension 30 mL QID PRN    argatroban in sodium chloride 0.9% (conc: 1 mg/mL) Continuous    dextrose 10% bolus 125 mL 125 mL PRN    dextrose 10% bolus 250 mL 250 mL PRN    glucagon (human recombinant)  injection 1 mg PRN    glucose chewable tablet 16 g PRN    glucose chewable tablet 24 g PRN    lactulose 20 gram/30 mL solution Soln 30 g TID    magnesium sulfate 2g in water 50mL IVPB (premix) PRN    melatonin tablet 6 mg Nightly PRN    meropenem 2 g in 0.9% NaCl 100 mL IVPB (MB+) Q12H    midodrine tablet 15 mg TID    naloxone 0.4 mg/mL injection 0.02 mg PRN    NORepinephrine 4 mg in sodium chloride 0.9% 250 mL infusion (premix) Continuous    ondansetron injection 4 mg Q8H PRN    pantoprazole injection 40 mg Daily    simethicone chewable tablet 80 mg QID PRN    sodium chloride 0.9% flush 10 mL Q12H PRN    sodium chloride 0.9% flush 10 mL PRN    sodium phosphate 20.01 mmol in D5W 250 mL IVPB PRN    sodium phosphate 30 mmol in D5W 250 mL IVPB PRN    sodium phosphate 39.99 mmol in D5W 250 mL IVPB PRN    tamsulosin 24 hr capsule 0.4 mg QHS       Objective:     Vital Signs (Most Recent):  Temp: 97.2 °F (36.2 °C) (11/29/24 0345)  Pulse: 97 (11/29/24 1015)  Resp: 12 (11/29/24 1015)  BP: (!) 91/51 (11/28/24 0900)  SpO2: 96 % (11/29/24 1015) Vital Signs (24h Range):  Temp:  [96.9 °F (36.1 °C)-97.6 °F (36.4 °C)] 97.2 °F (36.2 °C)  Pulse:  [] 97  Resp:  [10-28] 12  SpO2:  [89 %-100 %] 96 %  Arterial Line BP: ()/(39-67) 109/42     Weight: (!) 150.2 kg (331 lb 2.1 oz) (11/29/24 0400)  Body mass index is 43.69 kg/m².  Body surface area is 2.78 meters squared.    I/O last 3 completed shifts:  In: 4993.6 [P.O.:240; I.V.:3823.6; IV Piggyback:930]  Out: 7096 [Urine:110; Emesis/NG output:700; Drains:700; Other:5586]     Physical Exam  Vitals and nursing note reviewed.   Constitutional:       General: He is awake. He is not in acute distress.     Appearance: He is obese. He is ill-appearing. He is not toxic-appearing.   HENT:      Head: Normocephalic and atraumatic.   Eyes:      General: No scleral icterus.     Extraocular Movements: Extraocular movements intact.      Conjunctiva/sclera: Conjunctivae normal.    Cardiovascular:      Rate and Rhythm: Tachycardia present. Rhythm irregular.   Pulmonary:      Effort: Pulmonary effort is normal. No respiratory distress.   Abdominal:      General: There is distension.      Palpations: Abdomen is soft.      Tenderness: There is no abdominal tenderness. There is no guarding or rebound.   Musculoskeletal:         General: No swelling or tenderness.      Right lower leg: Edema present.      Left lower leg: Edema present.      Comments: 2+ pitting edema in bilateral lower extremities   Skin:     General: Skin is warm.      Coloration: Skin is not jaundiced.      Findings: Bruising present.   Neurological:      Mental Status: He is alert and oriented to person, place, and time.      Motor: No weakness.   Psychiatric:         Behavior: Behavior is slowed.        Significant Labs:  CBC:   Recent Labs   Lab 11/29/24  1034   WBC 8.31   RBC 2.94*   HGB 7.3*   HCT 23.3*   PLT 23*   MCV 79*   MCH 24.8*   MCHC 31.3*     CMP:   Recent Labs   Lab 11/29/24  0306 11/29/24  1034   GLU 99  --    CALCIUM 7.8*  --    ALBUMIN 2.9* 2.8*   PROT 5.7* 5.5*   *  --    K 3.8  --    CO2 22*  --      --    BUN 11  --    CREATININE 2.0* 2.5*   ALKPHOS 36* 35*   ALT 9* 10   AST 14 16   BILITOT 3.0* 2.9*     All labs within the past 24 hours have been reviewed.     Significant Imaging:  All imaging within the past 24 hours have been reviewed.   Assessment/Plan:     Renal/  Stage 3a chronic kidney disease  Creatinine stable for now. BMP reviewed- noted Estimated Creatinine Clearance: 41.4 mL/min (A) (based on SCr of 2.5 mg/dL (H)). according to latest data. Based on current GFR, CKD stage is stage 3 - GFR 30-59.  Monitor UOP and serial BMP and adjust therapy as needed. Renally dose meds. Avoid nephrotoxic medications and procedures. See JAE (acute kidney injury).      JAE (acute kidney injury)  JAE is likely due to pre-renal azotemia due to intravascular volume depletion secondary to decompensated  hepatic cirrhosis with volume overload and acute tubular necrosis caused by hemodynamic instability, renal hypoperfusion, severe sepsis with GNR bacteremia. Initial presentation concerning for hepatorenal syndrome, transferred to MICU for vasopressors without success in reaching MAP goal 85-90 for treatment of HRS. Currently, patient with oligouric JAE more contributed by ATN as above.     Recommendations:  -Plan for 6 hours SCUF followed by 6 hours SLED tonight for removal of uremic toxins and volume control  -Recommend further goals of care discussion regarding current prognosis and liver transplant candidacy   -Anticipate need for tunneled hemodialysis catheter placement pending C discussions, whether or not patient would need long term hemodialysis, and if bacteremia cleared  -Avoid nephrotoxins and renally dose meds for GFR listed above  -Monitor urine output, serial BMP, and adjust therapy as needed  -Hemodialysis consent obtained & placed in patient chart        Thank you for your consult. We will follow-up with patient. Please contact us if you have any additional questions.    Margarita Paredes MD  Nephrology  [facility] - Medical ICU

## 2024-11-29 NOTE — ASSESSMENT & PLAN NOTE
Creatinine stable for now. BMP reviewed- noted Estimated Creatinine Clearance: 41.4 mL/min (A) (based on SCr of 2.5 mg/dL (H)). according to latest data. Based on current GFR, CKD stage is stage 3 - GFR 30-59.  Monitor UOP and serial BMP and adjust therapy as needed. Renally dose meds. Avoid nephrotoxic medications and procedures. See JAE (acute kidney injury).

## 2024-11-29 NOTE — PROGRESS NOTES
11/29/24 0414   Treatment   Treatment Type SLED   Treatment Status Blood returned   Dialyzer Time (hours) 8.01   BVP (Liters) 94.8 L   CRRT Comments Tx completed; blood returned

## 2024-11-29 NOTE — PLAN OF CARE
Recommendations     1.) Recommend continuing with CLD- advancing per MD.      2.) Recommend Boost Breeze TID to help meet increased needs.                  - once diet advances, may change to Boost Plus TID.     3.) RD to monitor wt, PO intake, skin, labs.      Goals: to meet % of EEN/EPN by next RD f/u  Nutrition Goal Status: new  Communication of RD Recs:  (POC)

## 2024-11-29 NOTE — ASSESSMENT & PLAN NOTE
This patient does have evidence of infective focus  My overall impression is sepsis.  Source: Abdominal  Antibiotics given-   Antibiotics (72h ago, onward)    Start     Stop Route Frequency Ordered    11/29/24 1700  meropenem injection 1 g         -- IV Every 12 hours (non-standard times) 11/29/24 1304        Latest lactate reviewed-  Recent Labs   Lab 11/28/24  1345   LACTATE 2.7*       Organ dysfunction indicated by Acute kidney injury    Fluid challenge Contraindicated- Fluid bolus is contraindicated in this patient due to End Stage Liver Disease     Post- resuscitation assessment No - Post resuscitation assessment not needed     Source control achieved by: antibiotics

## 2024-11-29 NOTE — NURSING
MICU DAILY GOALS     Family/Goals of care/Code Status   Code Status: Full Code    24H Vital Sign Range  Temp:  [96.9 °F (36.1 °C)-97.7 °F (36.5 °C)]   Pulse:  []   Resp:  [9-28]   BP: ()/(50-57)   SpO2:  [93 %-100 %]   Arterial Line BP: ()/(38-67)      Shift Events (include procedures and significant events)   Decreased output from NGT noted. Awaiting CRRT. Goals of Care initiated by primary team. Visited by daughter, pt slept most of day, arouses easily.    AWAKE RASS: Goal - RASS Goal: 0-->alert and calm  Actual - RASS (Pedraza Agitation-Sedation Scale): alert and calm    Restraint necessity: Not necessary   BREATHE SBT: NA    Coordinate A & B, analgesics/sedatives Pain: managed   SAT: NA   Delirium CAM-ICU: Overall CAM-ICU: Negative   Early(intubated/ Progressive (non-intubated) Mobility MOVE Screen (INTUBATED ONLY): NA    Activity: Activity Management: Rolling - L1   Feeding/Nutrition Diet order: Diet/Nutrition Received: NPO, Specialty Diet/Nutrition Received: renal diet   Thrombus DVT prophylaxis: VTE Core Measure: (SCDs) Sequential compression device initiated/maintained   HOB Elevation Head of Bed (HOB) Positioning: HOB at 30 degrees   Ulcer Prophylaxis GI: yes   Glucose control na  Glycemic Management: blood glucose monitored   Skin Skin assessment:     Sacrum intact/not altered? Yes  Heels intact/not altered? Yes  Surgical wound? No    CHECK ONE!   (no altered skin or altered skin) and sub boxes:  [x] No Altered Skin Integrity Present    []Prevention Measures Documented    [] Altered Skin Integrity Present or Discovered   [] LDA present in EPIC, daily doc completed              [] LDA added if not in EPIC (describe wound).                    When describing wound, do not stage, use descriptive words only.    [] Wound Image Taken (required on admit,                   transfer/discharge and every Tuesday)    Wound Care Consulted? No   Bowel Function no issues    Indwelling Catheter  Necessity      Urethral Catheter 11/26/24 1738-Reason for Continuing Urinary Catheterization: Critically ill in ICU and requiring hourly monitoring of intake/output  [REMOVED]      Urethral Catheter 11/20/24 1802 Double-lumen-Reason for Continuing Urinary Catheterization: Urinary retention    Trialysis (Dialysis) Catheter 11/24/24 0441 right internal jugular-Line Necessity Review: CRRT/HD  yes   De-escalation Antibiotics No        VS and assessment per flow sheet, patient progressing towards goals as tolerated, plan of care reviewed with family, all concerns addressed, will continue to monitor.

## 2024-11-30 LAB
ACANTHOCYTES BLD QL SMEAR: PRESENT
ALBUMIN SERPL BCP-MCNC: 2.7 G/DL (ref 3.5–5.2)
ALBUMIN SERPL BCP-MCNC: 2.7 G/DL (ref 3.5–5.2)
ALBUMIN SERPL BCP-MCNC: 2.8 G/DL (ref 3.5–5.2)
ALP SERPL-CCNC: 36 U/L (ref 40–150)
ALP SERPL-CCNC: 43 U/L (ref 40–150)
ALT SERPL W/O P-5'-P-CCNC: 10 U/L (ref 10–44)
ALT SERPL W/O P-5'-P-CCNC: 9 U/L (ref 10–44)
ANION GAP SERPL CALC-SCNC: 6 MMOL/L (ref 8–16)
ANION GAP SERPL CALC-SCNC: 8 MMOL/L (ref 8–16)
ANISOCYTOSIS BLD QL SMEAR: ABNORMAL
AST SERPL-CCNC: 13 U/L (ref 10–40)
AST SERPL-CCNC: 13 U/L (ref 10–40)
BASO STIPL BLD QL SMEAR: ABNORMAL
BASOPHILS # BLD AUTO: 0 K/UL (ref 0–0.2)
BASOPHILS NFR BLD: 0 % (ref 0–1.9)
BILIRUB DIRECT SERPL-MCNC: 1.6 MG/DL (ref 0.1–0.3)
BILIRUB SERPL-MCNC: 2.9 MG/DL (ref 0.1–1)
BILIRUB SERPL-MCNC: 2.9 MG/DL (ref 0.1–1)
BUN SERPL-MCNC: 12 MG/DL (ref 8–23)
BUN SERPL-MCNC: 14 MG/DL (ref 8–23)
BURR CELLS BLD QL SMEAR: ABNORMAL
CALCIUM SERPL-MCNC: 7.7 MG/DL (ref 8.7–10.5)
CALCIUM SERPL-MCNC: 8.1 MG/DL (ref 8.7–10.5)
CHLORIDE SERPL-SCNC: 102 MMOL/L (ref 95–110)
CHLORIDE SERPL-SCNC: 104 MMOL/L (ref 95–110)
CO2 SERPL-SCNC: 22 MMOL/L (ref 23–29)
CO2 SERPL-SCNC: 23 MMOL/L (ref 23–29)
CREAT SERPL-MCNC: 2 MG/DL (ref 0.5–1.4)
CREAT SERPL-MCNC: 2.6 MG/DL (ref 0.5–1.4)
DIFFERENTIAL METHOD BLD: ABNORMAL
EOSINOPHIL # BLD AUTO: 0.2 K/UL (ref 0–0.5)
EOSINOPHIL NFR BLD: 1.6 % (ref 0–8)
ERYTHROCYTE [DISTWIDTH] IN BLOOD BY AUTOMATED COUNT: 23 % (ref 11.5–14.5)
EST. GFR  (NO RACE VARIABLE): 25.6 ML/MIN/1.73 M^2
EST. GFR  (NO RACE VARIABLE): 35 ML/MIN/1.73 M^2
GLUCOSE SERPL-MCNC: 100 MG/DL (ref 70–110)
GLUCOSE SERPL-MCNC: 106 MG/DL (ref 70–110)
HCT VFR BLD AUTO: 23.4 % (ref 40–54)
HGB BLD-MCNC: 7.4 G/DL (ref 14–18)
HYPOCHROMIA BLD QL SMEAR: ABNORMAL
IMM GRANULOCYTES # BLD AUTO: 0.04 K/UL (ref 0–0.04)
IMM GRANULOCYTES NFR BLD AUTO: 0.4 % (ref 0–0.5)
LYMPHOCYTES # BLD AUTO: 0.4 K/UL (ref 1–4.8)
LYMPHOCYTES NFR BLD: 3.6 % (ref 18–48)
MAGNESIUM SERPL-MCNC: 1.9 MG/DL (ref 1.6–2.6)
MAGNESIUM SERPL-MCNC: 2.3 MG/DL (ref 1.6–2.6)
MAGNESIUM SERPL-MCNC: 2.3 MG/DL (ref 1.6–2.6)
MCH RBC QN AUTO: 24.7 PG (ref 27–31)
MCHC RBC AUTO-ENTMCNC: 31.6 G/DL (ref 32–36)
MCV RBC AUTO: 78 FL (ref 82–98)
MONOCYTES # BLD AUTO: 0.9 K/UL (ref 0.3–1)
MONOCYTES NFR BLD: 8.9 % (ref 4–15)
NEUTROPHILS # BLD AUTO: 8.2 K/UL (ref 1.8–7.7)
NEUTROPHILS NFR BLD: 85.5 % (ref 38–73)
NRBC BLD-RTO: 0 /100 WBC
OVALOCYTES BLD QL SMEAR: ABNORMAL
PHOSPHATE SERPL-MCNC: 3.1 MG/DL (ref 2.7–4.5)
PHOSPHATE SERPL-MCNC: 3.6 MG/DL (ref 2.7–4.5)
PLATELET # BLD AUTO: 32 K/UL (ref 150–450)
PLATELET BLD QL SMEAR: ABNORMAL
PMV BLD AUTO: ABNORMAL FL (ref 9.2–12.9)
POIKILOCYTOSIS BLD QL SMEAR: ABNORMAL
POLYCHROMASIA BLD QL SMEAR: ABNORMAL
POTASSIUM SERPL-SCNC: 4.1 MMOL/L (ref 3.5–5.1)
POTASSIUM SERPL-SCNC: 4.2 MMOL/L (ref 3.5–5.1)
PROT SERPL-MCNC: 5.5 G/DL (ref 6–8.4)
PROT SERPL-MCNC: 5.7 G/DL (ref 6–8.4)
RBC # BLD AUTO: 2.99 M/UL (ref 4.6–6.2)
SCHISTOCYTES BLD QL SMEAR: ABNORMAL
SCHISTOCYTES BLD QL SMEAR: PRESENT
SODIUM SERPL-SCNC: 132 MMOL/L (ref 136–145)
SODIUM SERPL-SCNC: 133 MMOL/L (ref 136–145)
TARGETS BLD QL SMEAR: ABNORMAL
WBC # BLD AUTO: 9.62 K/UL (ref 3.9–12.7)

## 2024-11-30 PROCEDURE — 99291 CRITICAL CARE FIRST HOUR: CPT | Mod: ,,, | Performed by: INTERNAL MEDICINE

## 2024-11-30 PROCEDURE — 80069 RENAL FUNCTION PANEL: CPT | Performed by: STUDENT IN AN ORGANIZED HEALTH CARE EDUCATION/TRAINING PROGRAM

## 2024-11-30 PROCEDURE — 80076 HEPATIC FUNCTION PANEL: CPT

## 2024-11-30 PROCEDURE — 20000000 HC ICU ROOM

## 2024-11-30 PROCEDURE — 80053 COMPREHEN METABOLIC PANEL: CPT

## 2024-11-30 PROCEDURE — 83735 ASSAY OF MAGNESIUM: CPT

## 2024-11-30 PROCEDURE — 25000003 PHARM REV CODE 250

## 2024-11-30 PROCEDURE — 84100 ASSAY OF PHOSPHORUS: CPT

## 2024-11-30 PROCEDURE — 83735 ASSAY OF MAGNESIUM: CPT | Mod: 91 | Performed by: STUDENT IN AN ORGANIZED HEALTH CARE EDUCATION/TRAINING PROGRAM

## 2024-11-30 PROCEDURE — 25000003 PHARM REV CODE 250: Performed by: INTERNAL MEDICINE

## 2024-11-30 PROCEDURE — 25000003 PHARM REV CODE 250: Performed by: STUDENT IN AN ORGANIZED HEALTH CARE EDUCATION/TRAINING PROGRAM

## 2024-11-30 PROCEDURE — 63600175 PHARM REV CODE 636 W HCPCS

## 2024-11-30 PROCEDURE — 94761 N-INVAS EAR/PLS OXIMETRY MLT: CPT

## 2024-11-30 PROCEDURE — 63600175 PHARM REV CODE 636 W HCPCS: Performed by: INTERNAL MEDICINE

## 2024-11-30 PROCEDURE — 99900035 HC TECH TIME PER 15 MIN (STAT)

## 2024-11-30 PROCEDURE — 99233 SBSQ HOSP IP/OBS HIGH 50: CPT | Mod: ,,, | Performed by: INTERNAL MEDICINE

## 2024-11-30 PROCEDURE — 85025 COMPLETE CBC W/AUTO DIFF WBC: CPT

## 2024-11-30 PROCEDURE — 63600175 PHARM REV CODE 636 W HCPCS: Performed by: STUDENT IN AN ORGANIZED HEALTH CARE EDUCATION/TRAINING PROGRAM

## 2024-11-30 PROCEDURE — 27000221 HC OXYGEN, UP TO 24 HOURS

## 2024-11-30 RX ORDER — FUROSEMIDE 10 MG/ML
120 INJECTION INTRAMUSCULAR; INTRAVENOUS ONCE
Status: DISCONTINUED | OUTPATIENT
Start: 2024-11-30 | End: 2024-11-30

## 2024-11-30 RX ORDER — HEPARIN SODIUM 5000 [USP'U]/ML
5000 INJECTION, SOLUTION INTRAVENOUS; SUBCUTANEOUS EVERY 8 HOURS
Status: DISCONTINUED | OUTPATIENT
Start: 2024-11-30 | End: 2024-12-02

## 2024-11-30 RX ORDER — FUROSEMIDE 10 MG/ML
120 INJECTION INTRAMUSCULAR; INTRAVENOUS ONCE
Status: COMPLETED | OUTPATIENT
Start: 2024-11-30 | End: 2024-11-30

## 2024-11-30 RX ORDER — PANTOPRAZOLE SODIUM 40 MG/1
40 TABLET, DELAYED RELEASE ORAL DAILY
Status: DISCONTINUED | OUTPATIENT
Start: 2024-12-01 | End: 2024-12-11

## 2024-11-30 RX ADMIN — HEPARIN SODIUM 5000 UNITS: 5000 INJECTION INTRAVENOUS; SUBCUTANEOUS at 03:11

## 2024-11-30 RX ADMIN — NOREPINEPHRINE BITARTRATE 0.04 MCG/KG/MIN: 16 SOLUTION INTRAVENOUS at 10:11

## 2024-11-30 RX ADMIN — LACTULOSE 30 G: 20 SOLUTION ORAL at 09:11

## 2024-11-30 RX ADMIN — LACTULOSE 30 G: 20 SOLUTION ORAL at 02:11

## 2024-11-30 RX ADMIN — MIDODRINE HYDROCHLORIDE 15 MG: 5 TABLET ORAL at 09:11

## 2024-11-30 RX ADMIN — MIDODRINE HYDROCHLORIDE 15 MG: 5 TABLET ORAL at 02:11

## 2024-11-30 RX ADMIN — MAGNESIUM SULFATE HEPTAHYDRATE 2 G: 40 INJECTION, SOLUTION INTRAVENOUS at 04:11

## 2024-11-30 RX ADMIN — PANTOPRAZOLE SODIUM 40 MG: 40 INJECTION, POWDER, LYOPHILIZED, FOR SOLUTION INTRAVENOUS at 09:11

## 2024-11-30 RX ADMIN — SODIUM CHLORIDE: 9 INJECTION, SOLUTION INTRAVENOUS at 03:11

## 2024-11-30 RX ADMIN — FUROSEMIDE 120 MG: 10 INJECTION, SOLUTION INTRAMUSCULAR; INTRAVENOUS at 01:11

## 2024-11-30 RX ADMIN — NOREPINEPHRINE BITARTRATE 0.08 MCG/KG/MIN: 16 SOLUTION INTRAVENOUS at 12:11

## 2024-11-30 RX ADMIN — MEROPENEM 1 G: 1 INJECTION INTRAVENOUS at 04:11

## 2024-11-30 RX ADMIN — NOREPINEPHRINE BITARTRATE 0.05 MCG/KG/MIN: 16 SOLUTION INTRAVENOUS at 01:11

## 2024-11-30 RX ADMIN — TAMSULOSIN HYDROCHLORIDE 0.4 MG: 0.4 CAPSULE ORAL at 09:11

## 2024-11-30 RX ADMIN — HEPARIN SODIUM 5000 UNITS: 5000 INJECTION INTRAVENOUS; SUBCUTANEOUS at 09:11

## 2024-11-30 NOTE — PROGRESS NOTES
11/29/24 1851   Treatment   Treatment Type SCUF   Treatment Status Restart;Order change   Dialysis Machine Number [id number]   Solutions Labeled and Current  Yes   Access Temporary Cath;Right;IJ   Catheter Dressing Intact  Yes   Alarms Engaged Yes   CRRT Comments CRRT restarted without issue

## 2024-11-30 NOTE — PROGRESS NOTES
[facility] - Medical ICU  Critical Care Medicine  Progress Note    Patient Name: Juan Carlos Yoo Sr.  MRN: [id number]  Admission Date: 11/20/2024  Hospital Length of Stay: 10 days  Code Status: Full Code  Attending Provider: Kristen Lopez MD  Primary Care Provider: Nyla Rios FNP   Principal Problem: Decompensated cirrhosis    Subjective:     HPI:  Juan Carlos Yoo is a 69 male with PMH of alcoholic cirrhosis, esophageal varices, Afib on eliquis, and HTN who presents for c/o worsening diffuse swelling as well as R arm pain/erythema. He was recently hospitalized 1 month ago at [facility] for volume overload in the setting of decompensated cirrhosis. He was treated with IV diuresis and discharged with adjustment to his diuretics, currently on lasix 60mg BID and metolazone 2.5mg every other week as per family. Patient reports diffuse swelling of his upper and lower extremities in addition to distended abdomen and worsening dyspnea, which he believes is due to recent medication adjustment. Family states he is non-compliant with low sodium diet and fluid restriction. Family states he has been compliant with diuretics, but rom't taken eliquis for 3 days due to issue getting refill. He also reports scratching right arm on door frame 3-4 days ago which has become red and painful after wrapping in in bandage. He claims to be compliant with medications, but is a poor historian. He follows with hepatology, not currently active on transplant list. He states he hasn't consumed alcohol for at least 9 months. Has c/o chills, but denies fever, cough, nausea, vomiting, chest pain, dysuria, hematuria, blood in stool. Workup in ED remarkable for VS: T 97.6 HR 94 RR 22 BP 95/56 O2 sat 97% on RA. Hb 6.7, MCV 75, Plt 81, PT/INR 22.6/2.2, Na 128, K 6.1, BUN/Cr 49/5.7, Alb 2.8, AST/ALT 35/9, Ammonia 104, , Trop neg, LA 2.9, CXR: Cardiac size is enlarged similar to prior.  No large volume of pleural fluid noted although there is mild  blunting of the right costophrenic angle which may relate to a small amount of pleural fluid.  Suspected right basilar opacity, to be correlated clinically for infection. RUE venous doppler: No thrombus in central veins of the right upper extremity. Patient received vanc/zosyn and lasix 80mg IVP in ED and will be admitted to hospital medicine service for further management.     Pt was admitted to hospital medicine. Blood cultures positive for Gram-positive cocci and Gram-negative rods. ID has been consulted. S/p 2 units PRBC for Hemoglobin dropped 6.8 on 11/21. Pt was on Eliquis for atrial fibrillation prior to admission which was held.  Hepatology following patient's clinical course, but are not evaluating him for transplant at this time. Diuretics were held because of concern for HRS.  Patient was started on albumin and midodrine per Nephrology recommendations for HRS.  Renal function continued to worsen and recommendation per Nephrology to transfer to ICU for maintaining goal map over 85 on Levophed.  ICU was notified and patient to be assessed to be transferred to ICU.       Hospital/ICU Course:  69 y.o. male with history of EtOH use disorder, EtOH cirrhosis, HCC s/p y90, morbid obesity BMI 44, HTN, and Afib who presents with severe anasarca and JAE.      Appreciate hepatology and nephrology recommendations.  Diuretics were held because of concern for HRS.  Patient was started on albumin and midodrine per Nephrology recommendations for HRS.  Renal function continued to worsen and recommendation per Nephrology to transfer to ICU for maintaining goal map over 85 on Levophed.  ICU was notified and patient to be assessed to be transferred to ICU.     Patient also has Gram-positive cocci and Gram-negative rods in his blood now.  Possible sources could be got translocation and barrier related infection through skin as he has been significantly edematous and has been oozing.  Has been on vancomycin and Rocephin.  Id  was consulted this morning.  Repeat blood cultures were obtained.     Continues to have anemia.  Hemoglobin dropped on 11/20 and was given 1 unit of packed red blood cells.  Did not respond appropriately.  Hemoglobin dropped again to 6.8 on 11/21 and was given 1 unit of packed red blood cells.  Hemoglobin again on 11/22 is 7.2.  No reported melena or hematochezia.  Patient was on Eliquis for atrial fibrillation prior to admission which was held.  Given history of esophageal varices and portal hypertensive gastropathy whereas also could be component of slow bleeding, under production due to CKD and hemolysis while also with decompensated cirrhosis.  Started on Protonix IV b.i.d. and octreotide.     Also clarified with hepatology.  Given patient's current infection we will await ID recommendations however we will be challenging to consider transplant evaluation at this time.  Trend clinical course     Goal clarification with the patient and family.  Patient at this time wants to be full code and continue with Levophed in ICU.  They would consider discussion with palliative care in a day or so if things do not get better.     In MICU patient started on Levophed, however titration remained difficult given tachycardic response from the patient.     11/24 Trialysis and arterial lines placed overnight and currently on levo and norepi. SLED today.    11/25 Boo dc. Increased midodrine. Continue steroids until d/c pressors. Discussed with Nephrology about our concern for sustained use of pressors to achieve their MAP goals of 85. Will follow up tomorrow.     11/26 Platelets 48. Repeat 38 confirming thrombocytopenia. Concern for HIT. D/c heparin. Increased lactulose dosing. Bladder scan revealed urine in bladder. Boo placed. Off vaso. Continuing levo. Maintain MAP goals 80. PT/OT consulted.     11/27 MAP goal 75-80. Heparin antibody result pending.     11/28 Pt with abdominal pain, decreased bowel movements, emesis. Lactic  acid 2.9 and repeat 2.7. Less concerned for mesenteric ischemia and more of a concern was for SBO. NG tube placed with subsequent suction of 500mL fluid. Abdomen less distended after placement and pt subsequently had bowel movement. MAP Goal of 60 with plan to come off pressors entirely and switch to dialysis after permacath, as he is not  liver transplant eligible at this time.     11/29 Pt with ileus. Clear liquids for now. Platelets 24. HIT versus zosyn induced? Peripheral smear sent. Zosyn changed to kaylah. Consent and transfuse 1U. GOC conversation today. Pt opting for long term dialysis.     11/30 naeon. Started back on heparin. One time dose of 120mg lasix today. Hold off SLED/SCUF today and give IV lasix 120 mg once; plan for SLED/SCUF tomorrow     Interval History/Significant Events: naeon    Review of Systems no interval change  Objective:     Vital Signs (Most Recent):  Temp: 98.3 °F (36.8 °C) (11/30/24 0701)  Pulse: 93 (11/30/24 1031)  Resp: 12 (11/30/24 1031)  BP: (!) 99/52 (11/30/24 0801)  SpO2: 95 % (11/30/24 1031) Vital Signs (24h Range):  Temp:  [96.1 °F (35.6 °C)-98.3 °F (36.8 °C)] 98.3 °F (36.8 °C)  Pulse:  [] 93  Resp:  [7-30] 12  SpO2:  [90 %-98 %] 95 %  BP: ()/(52-59) 99/52  Arterial Line BP: (100-135)/(36-52) 115/41   Weight: (!) 148 kg (326 lb 4.5 oz)  Body mass index is 43.05 kg/m².      Intake/Output Summary (Last 24 hours) at 11/30/2024 1622  Last data filed at 11/30/2024 1452  Gross per 24 hour   Intake 3837.6 ml   Output 4119 ml   Net -281.4 ml          Physical Exam     Constitutional:       General: He is not in acute distress.     Appearance: He is obese. He is ill-appearing (chronic). He is not toxic-appearing.   HENT:      Head: Normocephalic and atraumatic.   Eyes:    Extraocular Movements: Extraocular movements intact.     Conjunctiva/sclera: Conjunctivae normal.   Cardiovascular:      Rate and Rhythm: Normal rate. Regular rhythm     Heart sounds: Normal heart sounds.    Pulmonary:      Effort: Pulmonary effort is normal. No respiratory distress.      Breath sounds: No wheezing.   Abdominal:      General: Bowel sounds are hypoactive. There is distension.      Palpations: Abdomen is firm.       Tenderness: There is no abdominal tenderness.      Hernia: A hernia (umbilical and L inguinal hernia) is present.   Musculoskeletal:         General: Normal range of motion.      Right upper arm: Swelling and edema present.      Left upper arm: Swelling and edema present.      Cervical back: Normal range of motion and neck supple.      Right lower leg: Edema present.      Left lower leg: Edema present.   Skin:     General: Skin is warm and dry.      Coloration: Skin is not jaundiced.      Findings: Erythema (RUE) present.   Neurological:      General: No focal deficit present.      Mental Status: He is alert and oriented to person, place, and time.  Psychiatric:         Attention and Perception: Normal       Behavior: Behavior normal.   Vents:     Lines/Drains/Airways       Central Venous Catheter Line  Duration             Trialysis (Dialysis) Catheter 11/24/24 0441 right internal jugular 6 days              Drain  Duration                  Urethral Catheter 11/26/24 1738 3 days         NG/OG Tube 11/28/24 1306 18 Fr. Left nostril 2 days              Arterial Line  Duration             Arterial Line 11/24/24 0317 Right Radial 6 days              Peripheral Intravenous Line  Duration                  Peripheral IV - Single Lumen 11/27/24 1101 20 G Anterior;Left Forearm 3 days                  Significant Labs:    CBC/Anemia Profile:  Recent Labs   Lab 11/29/24  0306 11/29/24  1034 11/30/24  0320   WBC 10.88 8.31 9.62   HGB 7.4* 7.3* 7.4*   HCT 22.9* 23.3* 23.4*   PLT 27* 23* 32*   MCV 77* 79* 78*   RDW 23.4* 23.2* 23.0*        Chemistries:  Recent Labs   Lab 11/29/24  1034 11/29/24  1359 11/29/24  2157 11/30/24  0320 11/30/24  0941 11/30/24  1337   NA  --    < > 132*  132* 133*  --  132*   "132*  132*  132*   K  --    < > 4.1  4.1 4.1  --  4.2  4.2  4.2  4.2   CL  --    < > 102  102 104  --  102  102  102  102   CO2  --    < > 22*  22* 23  --  22*  22*  22*  22*   BUN  --    < > 16  16 12  --  14  14  14  14   CREATININE 2.5*   < > 3.0*  3.0* 2.0*  --  2.6*  2.6*  2.6*  2.6*   CALCIUM  --    < > 7.9*  7.9* 7.7*  --  8.1*  8.1*  8.1*  8.1*   ALBUMIN 2.8*   < > 2.8*  2.8* 2.7* 2.7* 2.8*  2.8*  2.8*  2.8*   PROT 5.5*  --   --  5.5* 5.7*  --    BILITOT 2.9*  --   --  2.9* 2.9*  --    ALKPHOS 35*  --   --  36* 43  --    ALT 10  --   --  10 9*  --    AST 16  --   --  13 13  --    MG  --    < > 2.0 1.9  --  2.3  2.3   PHOS  --    < > 4.3  4.3 3.1  --  3.6  3.6  3.6  3.6    < > = values in this interval not displayed.       All pertinent labs within the past 24 hours have been reviewed.    Significant Imaging:  I have reviewed all pertinent imaging results/findings within the past 24 hours.    ABG  No results for input(s): "PH", "PO2", "PCO2", "HCO3", "BE" in the last 168 hours.  Assessment/Plan:     Neuro  Encephalopathy, metabolic  AAOx4  Will assess for signs of encephalopathy     Cardiac/Vascular  Atrial fibrillation  Holding home Eliquis in the setting of recent low blood counts    Renal/  Stage 3a chronic kidney disease  --Nephrology following  --Strict intake and output  --Renally dose all medications  --Avoid nephrotoxic agents    JAE (acute kidney injury)  Baseline creatinine is 1.5    Cr trend 6.3 > 6.4     Plan  -- Stress dosing with Fludrocortisone and Hydrocortisone as HRS patients tend to have AI-- 11/27 d/c steroids  --appreciate nephrology recommendations continuing dialysis.   - 11/29 MAP goal > 60-65    ID  Gram-negative bacteremia  Blood cultures from admission with B. Diminuta, micrococcus luteus, lysinobacillus species. Seen by ID previously who recommended stopping antibiotics due to concern these were contaminants. Repeat blood cultures have been no " growth. Has since been transferred to ICU with increased pressor requirement. Blood cultures repeated on 11/24 are no growth x 24 hours.     -11/29 Switched from zosyn to meropenem due to concern of thrombocytopenia.     Severe sepsis  This patient does have evidence of infective focus  My overall impression is sepsis.  Source: Abdominal  Antibiotics given-   Antibiotics (72h ago, onward)      Start     Stop Route Frequency Ordered    11/29/24 1700  meropenem injection 1 g         -- IV Every 12 hours (non-standard times) 11/29/24 1304          Latest lactate reviewed-  Recent Labs   Lab 11/28/24  1345   LACTATE 2.7*       Organ dysfunction indicated by Acute kidney injury    Fluid challenge Contraindicated- Fluid bolus is contraindicated in this patient due to End Stage Liver Disease     Post- resuscitation assessment No - Post resuscitation assessment not needed     Source control achieved by: antibiotics    Hematology  Thrombocytopenia  --Daily CBC  --Transfuse for PLT<10k, or PLT<50K with active bleeding  --Monitor for signs and symptoms of bleeding    Coagulopathy  End Stage Liver Disease precipitated coagulopathy  -heparin d/c d/t thrombocytopenia    Endocrine  Hyponatremia  Likely dilutional secondary to end stage liver disease and HRS    Will monitor for S&S of hyponatremia and correct when clinically appropriate.     GI  * Decompensated cirrhosis  MELD 3.0: 32 at 11/28/2024  2:50 AM  MELD-Na: 30 at 11/28/2024  2:50 AM  Calculated from:  Serum Creatinine: 3 mg/dL at 11/28/2024  2:50 AM  Serum Sodium: 133 mmol/L at 11/28/2024  2:50 AM  Total Bilirubin: 3.2 mg/dL at 11/28/2024  2:50 AM  Serum Albumin: 3 g/dL at 11/28/2024  2:50 AM  INR(ratio): 2 at 11/27/2024  2:09 AM  Age at listing (hypothetical): 69 years  Sex: Male at 11/28/2024  2:50 AM    --Hepatology following, pt not transplant eligible at this time.            Critical Care Daily Checklist:    A: Awake: RASS Goal/Actual Goal: RASS Goal: 0-->alert and  calm  Actual:     B: Spontaneous Breathing Trial Performed?     C: SAT & SBT Coordinated?  N/a                      D: Delirium: CAM-ICU Overall CAM-ICU: Negative   E: Early Mobility Performed? Yes   F: Feeding Goal: Goals: to meet % of EEN/EPN by next RD f/u  Status: Nutrition Goal Status: new   Current Diet Order   Procedures    Diet Clear Liquid Standard Tray     Order Specific Question:   Tray type:     Answer:   Standard Tray      AS: Analgesia/Sedation none   T: Thromboembolic Prophylaxis heparin   H: HOB > 300 Yes   U: Stress Ulcer Prophylaxis (if needed) PPI   G: Glucose Control none   B: Bowel Function ok   I: Indwelling Catheter (Lines & Boo) Necessity Boo, trialysis, PIV   D: De-escalation of Antimicrobials/Pharmacotherapies Meropenem    Plan for the day/ETD D/c NG tube    Code Status:  Family/Goals of Care: Full Code         Critical secondary to hepatorenal syndrome      Critical care was time spent personally by me on the following activities: development of treatment plan with patient or surrogate and bedside caregivers, discussions with consultants, evaluation of patient's response to treatment, examination of patient, ordering and performing treatments and interventions, ordering and review of laboratory studies, ordering and review of radiographic studies, pulse oximetry, re-evaluation of patient's condition. This critical care time did not overlap with that of any other provider or involve time for any procedures.     Livier Macario MD  Critical Care Medicine  [facility] - Medical ICU

## 2024-11-30 NOTE — SUBJECTIVE & OBJECTIVE
Interval History/Significant Events: naeon    Review of Systems no interval change  Objective:     Vital Signs (Most Recent):  Temp: 98.3 °F (36.8 °C) (11/30/24 0701)  Pulse: 93 (11/30/24 1031)  Resp: 12 (11/30/24 1031)  BP: (!) 99/52 (11/30/24 0801)  SpO2: 95 % (11/30/24 1031) Vital Signs (24h Range):  Temp:  [96.1 °F (35.6 °C)-98.3 °F (36.8 °C)] 98.3 °F (36.8 °C)  Pulse:  [] 93  Resp:  [7-30] 12  SpO2:  [90 %-98 %] 95 %  BP: ()/(52-59) 99/52  Arterial Line BP: (100-135)/(36-52) 115/41   Weight: (!) 148 kg (326 lb 4.5 oz)  Body mass index is 43.05 kg/m².      Intake/Output Summary (Last 24 hours) at 11/30/2024 1622  Last data filed at 11/30/2024 1452  Gross per 24 hour   Intake 3837.6 ml   Output 4119 ml   Net -281.4 ml          Physical Exam     Constitutional:       General: He is not in acute distress.     Appearance: He is obese. He is ill-appearing (chronic). He is not toxic-appearing.   HENT:      Head: Normocephalic and atraumatic.   Eyes:    Extraocular Movements: Extraocular movements intact.     Conjunctiva/sclera: Conjunctivae normal.   Cardiovascular:      Rate and Rhythm: Normal rate. Regular rhythm     Heart sounds: Normal heart sounds.   Pulmonary:      Effort: Pulmonary effort is normal. No respiratory distress.      Breath sounds: No wheezing.   Abdominal:      General: Bowel sounds are hypoactive. There is distension.      Palpations: Abdomen is firm.       Tenderness: There is no abdominal tenderness.      Hernia: A hernia (umbilical and L inguinal hernia) is present.   Musculoskeletal:         General: Normal range of motion.      Right upper arm: Swelling and edema present.      Left upper arm: Swelling and edema present.      Cervical back: Normal range of motion and neck supple.      Right lower leg: Edema present.      Left lower leg: Edema present.   Skin:     General: Skin is warm and dry.      Coloration: Skin is not jaundiced.      Findings: Erythema (RUE) present.    Neurological:      General: No focal deficit present.      Mental Status: He is alert and oriented to person, place, and time.  Psychiatric:         Attention and Perception: Normal       Behavior: Behavior normal.   Vents:     Lines/Drains/Airways       Central Venous Catheter Line  Duration             Trialysis (Dialysis) Catheter 11/24/24 0441 right internal jugular 6 days              Drain  Duration                  Urethral Catheter 11/26/24 1738 3 days         NG/OG Tube 11/28/24 1306 18 Fr. Left nostril 2 days              Arterial Line  Duration             Arterial Line 11/24/24 0317 Right Radial 6 days              Peripheral Intravenous Line  Duration                  Peripheral IV - Single Lumen 11/27/24 1101 20 G Anterior;Left Forearm 3 days                  Significant Labs:    CBC/Anemia Profile:  Recent Labs   Lab 11/29/24  0306 11/29/24  1034 11/30/24  0320   WBC 10.88 8.31 9.62   HGB 7.4* 7.3* 7.4*   HCT 22.9* 23.3* 23.4*   PLT 27* 23* 32*   MCV 77* 79* 78*   RDW 23.4* 23.2* 23.0*        Chemistries:  Recent Labs   Lab 11/29/24  1034 11/29/24  1359 11/29/24  2157 11/30/24  0320 11/30/24  0941 11/30/24  1337   NA  --    < > 132*  132* 133*  --  132*  132*  132*  132*   K  --    < > 4.1  4.1 4.1  --  4.2  4.2  4.2  4.2   CL  --    < > 102  102 104  --  102  102  102  102   CO2  --    < > 22*  22* 23  --  22*  22*  22*  22*   BUN  --    < > 16  16 12  --  14  14  14  14   CREATININE 2.5*   < > 3.0*  3.0* 2.0*  --  2.6*  2.6*  2.6*  2.6*   CALCIUM  --    < > 7.9*  7.9* 7.7*  --  8.1*  8.1*  8.1*  8.1*   ALBUMIN 2.8*   < > 2.8*  2.8* 2.7* 2.7* 2.8*  2.8*  2.8*  2.8*   PROT 5.5*  --   --  5.5* 5.7*  --    BILITOT 2.9*  --   --  2.9* 2.9*  --    ALKPHOS 35*  --   --  36* 43  --    ALT 10  --   --  10 9*  --    AST 16  --   --  13 13  --    MG  --    < > 2.0 1.9  --  2.3  2.3   PHOS  --    < > 4.3  4.3 3.1  --  3.6  3.6  3.6  3.6    < > = values in this interval not  displayed.       All pertinent labs within the past 24 hours have been reviewed.    Significant Imaging:  I have reviewed all pertinent imaging results/findings within the past 24 hours.

## 2024-11-30 NOTE — PROGRESS NOTES
11/30/24 0517   Treatment   Treatment Type SLED   Treatment Status Blood returned;Discontinued treatment   Dialysis Machine Number [id number]   Dialyzer Time (hours) 10.27   BVP (Liters) 61.3 L   Access Temporary Cath;Right;IJ   CRRT Comments SLED completed   CRRT Hourly Documentation   Total UF (Hourly Cleared) (mL) 102     CRRT SCUF for 5 hours then SLED for 5 hours completed. Blood returned. Lines disconnected aseptically. R IJ temporary CVC flushed with saline, Lumens capped. Machine disinfected.

## 2024-11-30 NOTE — PLAN OF CARE
MICU DAILY GOALS     Family/Goals of care/Code Status   Code Status: Full Code    24H Vital Sign Range  Temp:  [96.1 °F (35.6 °C)-97.8 °F (36.6 °C)]   Pulse:  []   Resp:  [7-26]   BP: ()/(50-59)   SpO2:  [90 %-99 %]   Arterial Line BP: ()/(38-67)      Shift Events (include procedures and significant events)   Complete 10 hr CRRT treatment(5hr SCUFF/5hr SLEP). 375ml from ngt    AWAKE RASS: Goal - RASS Goal: 0-->alert and calm  Actual - RASS (Pedraza Agitation-Sedation Scale): drowsy    Restraint necessity: Not necessary   BREATHE SBT: Not intubated    Coordinate A & B, analgesics/sedatives Pain: managed   SAT: Not intubated   Delirium CAM-ICU: Overall CAM-ICU: Negative   Early(intubated/ Progressive (non-intubated) Mobility MOVE Screen (INTUBATED ONLY): Not intubated    Activity: Activity Management: Rolling - L1   Feeding/Nutrition Diet order: Diet/Nutrition Received: clear liquid, Specialty Diet/Nutrition Received: renal diet   Thrombus DVT prophylaxis: VTE Core Measure: (SCDs) Sequential compression device initiated/maintained   HOB Elevation Head of Bed (HOB) Positioning: HOB at 20-30 degrees   Ulcer Prophylaxis GI: yes   Glucose control managed Glycemic Management: blood glucose monitored, oral hydration promoted   Skin Skin assessment:     Sacrum intact/not altered? Yes  Heels intact/not altered? Yes  Surgical wound? No    CHECK ONE!   (no altered skin or altered skin) and sub boxes:  [] No Altered Skin Integrity Present    []Prevention Measures Documented    [x] Altered Skin Integrity Present or Discovered   [x] LDA present in EPIC, daily doc completed              [] LDA added if not in EPIC (describe wound).                    When describing wound, do not stage, use descriptive words only.    [] Wound Image Taken (required on admit,                   transfer/discharge and every Tuesday)    Wound Care Consulted? No   Bowel Function no issues. Last bowel mvmt 11/29/24   Indwelling Catheter  Necessity      Urethral Catheter 11/26/24 1738-Reason for Continuing Urinary Catheterization: Urinary retention, Critically ill in ICU and requiring hourly monitoring of intake/output  [REMOVED]      Urethral Catheter 11/20/24 1802 Double-lumen-Reason for Continuing Urinary Catheterization: Urinary retention    Trialysis (Dialysis) Catheter 11/24/24 0441 right internal jugular-Line Necessity Review: CRRT/HD     De-escalation Antibiotics Yes        VS and assessment per flow sheet, patient progressing towards goals as tolerated, plan of care reviewed with Juan Carlos Yoo, all concerns addressed, will continue to monitor.

## 2024-11-30 NOTE — PROGRESS NOTES
"Nephrology Daily Progress Note    Assessment and plan  JAE likely 2/2 HRS   -s/p levophed with increased MAP goal 80 mmHg 11/22-11/27, now > 65 mmHg  -last SLED 11/30 early morning  -discussing with the team to see if we can hold off SLED/SCUF today and give IV lasix 120 mg once; plan for SLED/SCUF tomorrow (given stable intravascular volume, I/O)     The patient is too sick to get a liver txp for now per Hepatology.      High risk    Subjective  No specific complaint  Sepsis resolved per the note  On levophed 0.03 mcg/min  SpO2 95% on room air  Intake without CRRT fluid is around 1.5L,  cc, drain 375 cc and some unmeasured stool volume    Objective  PHYSICAL EXAMINATION:  Blood pressure (!) 99/52, pulse 93, temperature 98.3 °F (36.8 °C), temperature source Axillary, resp. rate 12, height 6' 1" (1.854 m), weight (!) 148 kg (326 lb 4.5 oz), SpO2 95%.  Constitutional - acute ill looking  CVP normal  Respiratory - Coarse  Musculoskeletal - Peripheral edema 1-2+  "

## 2024-11-30 NOTE — ASSESSMENT & PLAN NOTE
Blood cultures from admission with B. Diminuta, micrococcus luteus, lysinobacillus species. Seen by ID previously who recommended stopping antibiotics due to concern these were contaminants. Repeat blood cultures have been no growth. Has since been transferred to ICU with increased pressor requirement. Blood cultures repeated on 11/24 are no growth x 24 hours.     -11/29 Switched from zosyn to meropenem due to concern of thrombocytopenia.

## 2024-11-30 NOTE — PROGRESS NOTES
11/30/24 0005   Treatment   Treatment Type SLED   Treatment Status Order change   Dialysis Machine Number [id number]   Dialyzer Time (hours) 5.15   BVP (Liters) 0.1 L   Solutions Labeled and Current  Yes   Access Temporary Cath;Right;IJ   Catheter Dressing Intact  Yes   Alarms Engaged Yes   CRRT Comments SCUF switched to SLED     SCUF for 5 hours completed. Treatment switched over to SLED per MD orders.

## 2024-11-30 NOTE — ASSESSMENT & PLAN NOTE
Baseline creatinine is 1.5    Cr trend 6.3 > 6.4     Plan  -- Stress dosing with Fludrocortisone and Hydrocortisone as HRS patients tend to have AI-- 11/27 d/c steroids  --appreciate nephrology recommendations continuing dialysis.   - 11/29 MAP goal > 60-65

## 2024-12-01 PROBLEM — R10.9 ABDOMINAL PAIN: Status: ACTIVE | Noted: 2024-12-01

## 2024-12-01 LAB
ALBUMIN SERPL BCP-MCNC: 2.4 G/DL (ref 3.5–5.2)
ALBUMIN SERPL BCP-MCNC: 2.6 G/DL (ref 3.5–5.2)
ALBUMIN SERPL BCP-MCNC: 2.6 G/DL (ref 3.5–5.2)
ALBUMIN SERPL BCP-MCNC: 2.7 G/DL (ref 3.5–5.2)
ALP SERPL-CCNC: 42 U/L (ref 40–150)
ALP SERPL-CCNC: 46 U/L (ref 40–150)
ALT SERPL W/O P-5'-P-CCNC: 11 U/L (ref 10–44)
ALT SERPL W/O P-5'-P-CCNC: 11 U/L (ref 10–44)
ANION GAP SERPL CALC-SCNC: 4 MMOL/L (ref 8–16)
ANION GAP SERPL CALC-SCNC: 6 MMOL/L (ref 8–16)
ANION GAP SERPL CALC-SCNC: 7 MMOL/L (ref 8–16)
AST SERPL-CCNC: 14 U/L (ref 10–40)
AST SERPL-CCNC: 15 U/L (ref 10–40)
BASOPHILS # BLD AUTO: 0.01 K/UL (ref 0–0.2)
BASOPHILS NFR BLD: 0.1 % (ref 0–1.9)
BILIRUB DIRECT SERPL-MCNC: 1.6 MG/DL (ref 0.1–0.3)
BILIRUB SERPL-MCNC: 3 MG/DL (ref 0.1–1)
BILIRUB SERPL-MCNC: 3.1 MG/DL (ref 0.1–1)
BUN SERPL-MCNC: 10 MG/DL (ref 8–23)
BUN SERPL-MCNC: 19 MG/DL (ref 8–23)
BUN SERPL-MCNC: 20 MG/DL (ref 8–23)
CALCIUM SERPL-MCNC: 7.8 MG/DL (ref 8.7–10.5)
CALCIUM SERPL-MCNC: 8.2 MG/DL (ref 8.7–10.5)
CALCIUM SERPL-MCNC: 8.4 MG/DL (ref 8.7–10.5)
CHLORIDE SERPL-SCNC: 103 MMOL/L (ref 95–110)
CHLORIDE SERPL-SCNC: 104 MMOL/L (ref 95–110)
CO2 SERPL-SCNC: 22 MMOL/L (ref 23–29)
CO2 SERPL-SCNC: 23 MMOL/L (ref 23–29)
CREAT SERPL-MCNC: 1.8 MG/DL (ref 0.5–1.4)
CREAT SERPL-MCNC: 3.1 MG/DL (ref 0.5–1.4)
DIFFERENTIAL METHOD BLD: ABNORMAL
EOSINOPHIL # BLD AUTO: 0.2 K/UL (ref 0–0.5)
EOSINOPHIL NFR BLD: 1.7 % (ref 0–8)
ERYTHROCYTE [DISTWIDTH] IN BLOOD BY AUTOMATED COUNT: 23.4 % (ref 11.5–14.5)
EST. GFR  (NO RACE VARIABLE): 20.7 ML/MIN/1.73 M^2
EST. GFR  (NO RACE VARIABLE): 39.7 ML/MIN/1.73 M^2
GLUCOSE SERPL-MCNC: 100 MG/DL (ref 70–110)
GLUCOSE SERPL-MCNC: 104 MG/DL (ref 70–110)
GLUCOSE SERPL-MCNC: 93 MG/DL (ref 70–110)
HCT VFR BLD AUTO: 23.6 % (ref 40–54)
HGB BLD-MCNC: 7.7 G/DL (ref 14–18)
IMM GRANULOCYTES # BLD AUTO: 0.05 K/UL (ref 0–0.04)
IMM GRANULOCYTES NFR BLD AUTO: 0.5 % (ref 0–0.5)
LYMPHOCYTES # BLD AUTO: 0.4 K/UL (ref 1–4.8)
LYMPHOCYTES NFR BLD: 3.9 % (ref 18–48)
MAGNESIUM SERPL-MCNC: 1.8 MG/DL (ref 1.6–2.6)
MAGNESIUM SERPL-MCNC: 1.9 MG/DL (ref 1.6–2.6)
MAGNESIUM SERPL-MCNC: 2.1 MG/DL (ref 1.6–2.6)
MCH RBC QN AUTO: 25.2 PG (ref 27–31)
MCHC RBC AUTO-ENTMCNC: 32.6 G/DL (ref 32–36)
MCV RBC AUTO: 77 FL (ref 82–98)
MONOCYTES # BLD AUTO: 1.2 K/UL (ref 0.3–1)
MONOCYTES NFR BLD: 11.5 % (ref 4–15)
NEUTROPHILS # BLD AUTO: 8.4 K/UL (ref 1.8–7.7)
NEUTROPHILS NFR BLD: 82.3 % (ref 38–73)
NRBC BLD-RTO: 0 /100 WBC
PHOSPHATE SERPL-MCNC: 2.1 MG/DL (ref 2.7–4.5)
PHOSPHATE SERPL-MCNC: 3.5 MG/DL (ref 2.7–4.5)
PHOSPHATE SERPL-MCNC: 3.8 MG/DL (ref 2.7–4.5)
PLATELET # BLD AUTO: 42 K/UL (ref 150–450)
PMV BLD AUTO: 10.6 FL (ref 9.2–12.9)
POTASSIUM SERPL-SCNC: 3.8 MMOL/L (ref 3.5–5.1)
POTASSIUM SERPL-SCNC: 3.9 MMOL/L (ref 3.5–5.1)
PROT SERPL-MCNC: 5.5 G/DL (ref 6–8.4)
PROT SERPL-MCNC: 5.6 G/DL (ref 6–8.4)
RBC # BLD AUTO: 3.05 M/UL (ref 4.6–6.2)
SODIUM SERPL-SCNC: 131 MMOL/L (ref 136–145)
SODIUM SERPL-SCNC: 131 MMOL/L (ref 136–145)
SODIUM SERPL-SCNC: 132 MMOL/L (ref 136–145)
WBC # BLD AUTO: 10.22 K/UL (ref 3.9–12.7)

## 2024-12-01 PROCEDURE — 90945 DIALYSIS ONE EVALUATION: CPT

## 2024-12-01 PROCEDURE — 27000221 HC OXYGEN, UP TO 24 HOURS

## 2024-12-01 PROCEDURE — 83735 ASSAY OF MAGNESIUM: CPT | Mod: 91 | Performed by: STUDENT IN AN ORGANIZED HEALTH CARE EDUCATION/TRAINING PROGRAM

## 2024-12-01 PROCEDURE — 25000003 PHARM REV CODE 250: Performed by: INTERNAL MEDICINE

## 2024-12-01 PROCEDURE — 80053 COMPREHEN METABOLIC PANEL: CPT

## 2024-12-01 PROCEDURE — 25000003 PHARM REV CODE 250

## 2024-12-01 PROCEDURE — 84100 ASSAY OF PHOSPHORUS: CPT

## 2024-12-01 PROCEDURE — 99291 CRITICAL CARE FIRST HOUR: CPT | Mod: ,,, | Performed by: INTERNAL MEDICINE

## 2024-12-01 PROCEDURE — 80100008 HC CRRT DAILY MAINTENANCE

## 2024-12-01 PROCEDURE — 80069 RENAL FUNCTION PANEL: CPT | Performed by: STUDENT IN AN ORGANIZED HEALTH CARE EDUCATION/TRAINING PROGRAM

## 2024-12-01 PROCEDURE — 94761 N-INVAS EAR/PLS OXIMETRY MLT: CPT

## 2024-12-01 PROCEDURE — 20000000 HC ICU ROOM

## 2024-12-01 PROCEDURE — 99233 SBSQ HOSP IP/OBS HIGH 50: CPT | Mod: ,,, | Performed by: INTERNAL MEDICINE

## 2024-12-01 PROCEDURE — 83735 ASSAY OF MAGNESIUM: CPT

## 2024-12-01 PROCEDURE — 99900035 HC TECH TIME PER 15 MIN (STAT)

## 2024-12-01 PROCEDURE — 63600175 PHARM REV CODE 636 W HCPCS: Performed by: INTERNAL MEDICINE

## 2024-12-01 PROCEDURE — 80069 RENAL FUNCTION PANEL: CPT | Mod: 91 | Performed by: STUDENT IN AN ORGANIZED HEALTH CARE EDUCATION/TRAINING PROGRAM

## 2024-12-01 PROCEDURE — 63600175 PHARM REV CODE 636 W HCPCS

## 2024-12-01 PROCEDURE — 85025 COMPLETE CBC W/AUTO DIFF WBC: CPT

## 2024-12-01 PROCEDURE — 80076 HEPATIC FUNCTION PANEL: CPT

## 2024-12-01 RX ORDER — SYRING-NEEDL,DISP,INSUL,0.3 ML 29 G X1/2"
296 SYRINGE, EMPTY DISPOSABLE MISCELLANEOUS
Status: COMPLETED | OUTPATIENT
Start: 2024-12-01 | End: 2024-12-01

## 2024-12-01 RX ORDER — HYDROCODONE BITARTRATE AND ACETAMINOPHEN 500; 5 MG/1; MG/1
TABLET ORAL CONTINUOUS
Status: ACTIVE | OUTPATIENT
Start: 2024-12-01 | End: 2024-12-02

## 2024-12-01 RX ORDER — PSEUDOEPHEDRINE/ACETAMINOPHEN 30MG-500MG
100 TABLET ORAL
Status: COMPLETED | OUTPATIENT
Start: 2024-12-01 | End: 2024-12-01

## 2024-12-01 RX ORDER — MAGNESIUM SULFATE HEPTAHYDRATE 40 MG/ML
2 INJECTION, SOLUTION INTRAVENOUS
Status: DISPENSED | OUTPATIENT
Start: 2024-12-01 | End: 2024-12-02

## 2024-12-01 RX ADMIN — MIDODRINE HYDROCHLORIDE 15 MG: 5 TABLET ORAL at 08:12

## 2024-12-01 RX ADMIN — PANTOPRAZOLE SODIUM 40 MG: 40 TABLET, DELAYED RELEASE ORAL at 04:12

## 2024-12-01 RX ADMIN — LACTULOSE 30 G: 20 SOLUTION ORAL at 08:12

## 2024-12-01 RX ADMIN — MAGNESIUM SULFATE HEPTAHYDRATE 2 G: 40 INJECTION, SOLUTION INTRAVENOUS at 11:12

## 2024-12-01 RX ADMIN — SODIUM CHLORIDE 500 ML: 0.9 INJECTION, SOLUTION INTRAVENOUS at 12:12

## 2024-12-01 RX ADMIN — MEROPENEM 1 G: 1 INJECTION INTRAVENOUS at 05:12

## 2024-12-01 RX ADMIN — HEPARIN SODIUM 5000 UNITS: 5000 INJECTION INTRAVENOUS; SUBCUTANEOUS at 08:12

## 2024-12-01 RX ADMIN — MEROPENEM 2 G: 2 INJECTION, POWDER, FOR SOLUTION INTRAVENOUS at 05:12

## 2024-12-01 RX ADMIN — HEPARIN SODIUM 5000 UNITS: 5000 INJECTION INTRAVENOUS; SUBCUTANEOUS at 05:12

## 2024-12-01 RX ADMIN — SODIUM CHLORIDE: 9 INJECTION, SOLUTION INTRAVENOUS at 11:12

## 2024-12-01 RX ADMIN — MAGNESIUM CITRATE 296 ML: 1.75 LIQUID ORAL at 01:12

## 2024-12-01 RX ADMIN — SODIUM CHLORIDE 200 ML/HR: 9 INJECTION, SOLUTION INTRAVENOUS at 03:12

## 2024-12-01 RX ADMIN — TAMSULOSIN HYDROCHLORIDE 0.4 MG: 0.4 CAPSULE ORAL at 08:12

## 2024-12-01 RX ADMIN — MIDODRINE HYDROCHLORIDE 15 MG: 5 TABLET ORAL at 04:12

## 2024-12-01 RX ADMIN — NOREPINEPHRINE BITARTRATE 0.04 MCG/KG/MIN: 16 SOLUTION INTRAVENOUS at 02:12

## 2024-12-01 RX ADMIN — NOREPINEPHRINE BITARTRATE 0.05 MCG/KG/MIN: 16 SOLUTION INTRAVENOUS at 10:12

## 2024-12-01 RX ADMIN — HEPARIN SODIUM 5000 UNITS: 5000 INJECTION INTRAVENOUS; SUBCUTANEOUS at 03:12

## 2024-12-01 RX ADMIN — LACTULOSE 30 G: 20 SOLUTION ORAL at 04:12

## 2024-12-01 RX ADMIN — Medication 100 ML: at 01:12

## 2024-12-01 NOTE — TREATMENT PLAN
Hepatology Treatment Plan    Juan Carlos Yoo Sr. is a 69 y.o. male admitted to hospital 11/20/2024 (Hospital Day: 12) due to Decompensated cirrhosis.     Interval History  On low dose pressors when on dialysis. NGT removed and diet advanced to clear liquids.     Objective  Temp:  [97.6 °F (36.4 °C)-98 °F (36.7 °C)] 97.9 °F (36.6 °C) (12/01 0332)  Pulse:  [] 99 (12/01 0901)  BP: ()/(52-57) 99/55 (12/01 0645)  Resp:  [11-30] 12 (12/01 0901)  SpO2:  [91 %-98 %] 96 % (12/01 0901)  Arterial Line BP: (101-137)/(34-53) 116/39 (12/01 0901)    Laboratory    Lab Results   Component Value Date    WBC 10.22 12/01/2024    HGB 7.7 (L) 12/01/2024    HCT 23.6 (L) 12/01/2024    MCV 77 (L) 12/01/2024    PLT 42 (L) 12/01/2024       Lab Results   Component Value Date     (L) 12/01/2024    K 3.9 12/01/2024     12/01/2024    CO2 22 (L) 12/01/2024    BUN 19 12/01/2024    CREATININE 3.1 (H) 12/01/2024    CALCIUM 8.4 (L) 12/01/2024       Lab Results   Component Value Date    ALBUMIN 2.7 (L) 12/01/2024    ALT 11 12/01/2024    AST 14 12/01/2024    GGT 30 12/13/2022    ALKPHOS 42 12/01/2024    BILITOT 3.1 (H) 12/01/2024       Lab Results   Component Value Date    INR 2.0 (H) 11/27/2024    INR 2.0 (H) 11/26/2024    INR 1.7 (H) 11/23/2024       MELD 3.0: 32 at 11/29/2024  9:57 PM  MELD-Na: 32 at 11/29/2024  9:57 PM  Calculated from:  Serum Creatinine: On dialysis. Using the maximum value.  Serum Sodium: 132 mmol/L at 11/29/2024  9:57 PM  Total Bilirubin: 2.9 mg/dL at 11/29/2024 10:34 AM  Serum Albumin: 2.8 g/dL at 11/29/2024  9:57 PM  INR(ratio): 2 at 11/27/2024  2:09 AM  Age at listing (hypothetical): 69 years  Sex: Male at 11/29/2024  9:57 PM    Assessment  Juan Carlos Yoo  is a 69 y.o. male with history of EtOH use disorder, EtOH cirrhosis, HCC s/p y90, morbid obesity BMI 44, HTN, and Afib who presents with severe anasarca and JAE. Known history of cirrhosis and is established patient of Dr. Daniels. Was previously  evaluated for transplant and was declined due to persistently positive PETHs in the past and also due to concern over BMI. Since May 2023 PETH has been negative, though patient reports he has not drank alcohol for the last 6 months at least. Presents now with decompensated cirrhosis and with significant renal injury with Cr 5.7 up from 1.5 a month prior. Unclear etiology of cirrhosis decompensation. He does report non-adherence with fluid restriction and volume overload could be contributing; unclear if patient has been taking medications appropriately given his poor insight. Also presents with marked renal dysfunction which is new from 1 month prior, and worsening renal function which is concerning for HRS. No prior known history of CHF and last TTE in 2022 with normal systolic function. Patient does report changes to his diuretic regimen at home recently that were made while he was at [facility] and is not sure if these changes were helping him keep fluid off. No LTAC records available, but it seems that patient had home lasix increased to 60mg BID and had metolazone 2.5 every other week added. Patient with fairly limited insight into his medical conditions and his medications, and has poor social support at home with only his son nearby who is a single father. PETH from 11/20 negative (last positive 2/2023).  Blood cultures from admission with B. Diminuta, micrococcus luteus, lysinobacillus species. ID consulted, on vancomycin and zosyn. Nephrology following for JAE; has been on SLED. Etiology of JAE likely HRS, has been in the MICU on pressor support. Pressors have weaned down but still requires them for SLED. PT/OT recommending moderate intensity therapy. On 11/28, abdomen was diffusely distended and he had bilious emesis. XRAY abdomen concerning for ileus vs obstruction. NGT placed with improvement in symptoms, removed on 11/30 and diet advanced to clear liquids.      Problem List:  Decompensated cirrhosis with  ascites  Acute renal failure, concerning for HRS  Severe obesity, BMI 43  Hx of EtOH Use disorder  Hx of HCC s/p Y90  Shock   Ileus vs obstruction     Recommendations:  - He is unfortunately not a transplant candidate at this time as he remains in the ICU critically ill in renal failure requiring pressor support. Additional prohibiting factors to liver transplant are poor functional status/weakness and poor insight into clinical illness. He does appear to have a good relationship with his son who has visited him in the hospital, but it is unclear whether he would be able to be a full time caregiver should he ever improve enough to be considered for transplant. Once off of pressor support and improvement in functional status, may consider/discuss transplant candidacy. It is unlikely that he will recover completely and his clinical trajectory remains poor. Consult Palliative care to assist in goals of care discussions.   - Nephrology following for JAE.  - Please obtain daily CBC, CMP, PT/INR.    Thank you for involving us in the care of Juan Carlos Yoo Sr.. Please call with any additional concerns or questions.    Eugenia Zuluaga MD  Gastroenterology Fellow, PGY-V  [facility]

## 2024-12-01 NOTE — PROGRESS NOTES
[facility] - Medical ICU  Critical Care Medicine  Progress Note    Patient Name: Juan Carlos Yoo Sr.  MRN: [id number]  Admission Date: 11/20/2024  Hospital Length of Stay: 11 days  Code Status: Full Code  Attending Provider: Kristen Lopez MD  Primary Care Provider: Nyla Rios FNP   Principal Problem: Decompensated cirrhosis    Subjective:     HPI:  Juan Carlos Yoo is a 69 male with PMH of alcoholic cirrhosis, esophageal varices, Afib on eliquis, and HTN who presents for c/o worsening diffuse swelling as well as R arm pain/erythema. He was recently hospitalized 1 month ago at [facility] for volume overload in the setting of decompensated cirrhosis. He was treated with IV diuresis and discharged with adjustment to his diuretics, currently on lasix 60mg BID and metolazone 2.5mg every other week as per family. Patient reports diffuse swelling of his upper and lower extremities in addition to distended abdomen and worsening dyspnea, which he believes is due to recent medication adjustment. Family states he is non-compliant with low sodium diet and fluid restriction. Family states he has been compliant with diuretics, but rom't taken eliquis for 3 days due to issue getting refill. He also reports scratching right arm on door frame 3-4 days ago which has become red and painful after wrapping in in bandage. He claims to be compliant with medications, but is a poor historian. He follows with hepatology, not currently active on transplant list. He states he hasn't consumed alcohol for at least 9 months. Has c/o chills, but denies fever, cough, nausea, vomiting, chest pain, dysuria, hematuria, blood in stool. Workup in ED remarkable for VS: T 97.6 HR 94 RR 22 BP 95/56 O2 sat 97% on RA. Hb 6.7, MCV 75, Plt 81, PT/INR 22.6/2.2, Na 128, K 6.1, BUN/Cr 49/5.7, Alb 2.8, AST/ALT 35/9, Ammonia 104, , Trop neg, LA 2.9, CXR: Cardiac size is enlarged similar to prior.  No large volume of pleural fluid noted although there is mild  blunting of the right costophrenic angle which may relate to a small amount of pleural fluid.  Suspected right basilar opacity, to be correlated clinically for infection. RUE venous doppler: No thrombus in central veins of the right upper extremity. Patient received vanc/zosyn and lasix 80mg IVP in ED and will be admitted to hospital medicine service for further management.     Pt was admitted to hospital medicine. Blood cultures positive for Gram-positive cocci and Gram-negative rods. ID has been consulted. S/p 2 units PRBC for Hemoglobin dropped 6.8 on 11/21. Pt was on Eliquis for atrial fibrillation prior to admission which was held.  Hepatology following patient's clinical course, but are not evaluating him for transplant at this time. Diuretics were held because of concern for HRS.  Patient was started on albumin and midodrine per Nephrology recommendations for HRS.  Renal function continued to worsen and recommendation per Nephrology to transfer to ICU for maintaining goal map over 85 on Levophed.  ICU was notified and patient to be assessed to be transferred to ICU.       Hospital/ICU Course:  69 y.o. male with history of EtOH use disorder, EtOH cirrhosis, HCC s/p y90, morbid obesity BMI 44, HTN, and Afib who presents with severe anasarca and JAE.      Appreciate hepatology and nephrology recommendations.  Diuretics were held because of concern for HRS.  Patient was started on albumin and midodrine per Nephrology recommendations for HRS.  Renal function continued to worsen and recommendation per Nephrology to transfer to ICU for maintaining goal map over 85 on Levophed.  ICU was notified and patient to be assessed to be transferred to ICU.     Patient also has Gram-positive cocci and Gram-negative rods in his blood now.  Possible sources could be got translocation and barrier related infection through skin as he has been significantly edematous and has been oozing.  Has been on vancomycin and Rocephin.  Id  was consulted this morning.  Repeat blood cultures were obtained.     Continues to have anemia.  Hemoglobin dropped on 11/20 and was given 1 unit of packed red blood cells.  Did not respond appropriately.  Hemoglobin dropped again to 6.8 on 11/21 and was given 1 unit of packed red blood cells.  Hemoglobin again on 11/22 is 7.2.  No reported melena or hematochezia.  Patient was on Eliquis for atrial fibrillation prior to admission which was held.  Given history of esophageal varices and portal hypertensive gastropathy whereas also could be component of slow bleeding, under production due to CKD and hemolysis while also with decompensated cirrhosis.  Started on Protonix IV b.i.d. and octreotide.     Also clarified with hepatology.  Given patient's current infection we will await ID recommendations however we will be challenging to consider transplant evaluation at this time.  Trend clinical course     Goal clarification with the patient and family.  Patient at this time wants to be full code and continue with Levophed in ICU.  They would consider discussion with palliative care in a day or so if things do not get better.     In MICU patient started on Levophed, however titration remained difficult given tachycardic response from the patient.     11/24 Trialysis and arterial lines placed overnight and currently on levo and norepi. SLED today.    11/25 Boo dc. Increased midodrine. Continue steroids until d/c pressors. Discussed with Nephrology about our concern for sustained use of pressors to achieve their MAP goals of 85. Will follow up tomorrow.     11/26 Platelets 48. Repeat 38 confirming thrombocytopenia. Concern for HIT. D/c heparin. Increased lactulose dosing. Bladder scan revealed urine in bladder. Boo placed. Off vaso. Continuing levo. Maintain MAP goals 80. PT/OT consulted.     11/27 MAP goal 75-80. Heparin antibody result pending.     11/28 Pt with abdominal pain, decreased bowel movements, emesis. Lactic  "acid 2.9 and repeat 2.7. Less concerned for mesenteric ischemia and more of a concern was for SBO. NG tube placed with subsequent suction of 500mL fluid. Abdomen less distended after placement and pt subsequently had bowel movement. MAP Goal of 60 with plan to come off pressors entirely and switch to dialysis after permacath, as he is not  liver transplant eligible at this time.     11/29 Pt with ileus. Clear liquids for now. Platelets 24. HIT versus zosyn induced? Peripheral smear sent. Zosyn changed to kaylah. Consent and transfuse 1U. GOC conversation today. Pt opting for long term dialysis.     11/30 naeon. Started back on heparin. One time dose of 120mg lasix today. Hold off SLED/SCUF today and give IV lasix 120 mg once; plan for SLED/SCUF tomorrow     12/1 Patient is becoming more dependent on dialysis. Not a current liver transplant candidate. Still complaining of abdominal pain after discontinuing the NGT. AXR with evidence of dilated bowel loops. Brown bomb administered.    No new subjective & objective note has been filed under this hospital service since the last note was generated.      ABG  No results for input(s): "PH", "PO2", "PCO2", "HCO3", "BE" in the last 168 hours.    BP (!) 99/55   Pulse (!) 115   Temp 97.9 °F (36.6 °C) (Axillary)   Resp (!) 25   Ht 6' 1" (1.854 m)   Wt (!) 148.6 kg (327 lb 9.7 oz)   SpO2 97%   BMI 43.22 kg/m²    Physical Exam     Constitutional:       General: He is not in acute distress.     Appearance: He is obese. He is ill-appearing (chronic). He is not toxic-appearing.   HENT:      Head: Normocephalic and atraumatic.   Eyes:    Extraocular Movements: Extraocular movements intact.     Conjunctiva/sclera: Conjunctivae normal.   Cardiovascular:      Rate and Rhythm: Normal rate. Regular rhythm     Heart sounds: Normal heart sounds.   Pulmonary:      Effort: Pulmonary effort is normal. No respiratory distress.      Breath sounds: No wheezing.   Abdominal:      General: " Bowel sounds are hypoactive. There is distension.      Palpations: Abdomen is firm.       Tenderness: There is no abdominal tenderness.      Hernia: A hernia (umbilical and L inguinal hernia) is present.   Musculoskeletal:         General: Normal range of motion.      Right upper arm: Swelling and edema present.      Left upper arm: Swelling and edema present.      Cervical back: Normal range of motion and neck supple.      Right lower leg: Edema present.      Left lower leg: Edema present.   Skin:     General: Skin is warm and dry.      Coloration: Skin is not jaundiced.      Findings: Erythema (RUE) present.   Neurological:      General: No focal deficit present.      Mental Status: He is alert and oriented to person, place, and time.  Psychiatric:         Attention and Perception: Normal       Behavior: Behavior normal.     Assessment/Plan:     Neuro  Encephalopathy, metabolic  AAOx4  Will assess for signs of encephalopathy     Cardiac/Vascular  Atrial fibrillation  Holding home Eliquis in the setting of recent low blood counts    Renal/  Stage 3a chronic kidney disease  --Nephrology following  --Strict intake and output  --Renally dose all medications  --Avoid nephrotoxic agents    JAE (acute kidney injury)  Baseline creatinine is 1.5    Cr trend 6.3 > 6.4     Plan  -- Stress dosing with Fludrocortisone and Hydrocortisone as HRS patients tend to have AI-- 11/27 d/c steroids  --appreciate nephrology recommendations continuing dialysis.   - 11/29 MAP goal > 60-65    ID  Gram-negative bacteremia  Blood cultures from admission with B. Diminuta, micrococcus luteus, lysinobacillus species. Seen by ID previously who recommended stopping antibiotics due to concern these were contaminants. Repeat blood cultures have been no growth. Has since been transferred to ICU with increased pressor requirement. Blood cultures repeated on 11/24 are no growth x 24 hours.     -11/29 Switched from zosyn to meropenem due to concern of  thrombocytopenia.     Severe sepsis  This patient does have evidence of infective focus  My overall impression is sepsis.  Source: Abdominal  Antibiotics given-   Antibiotics (72h ago, onward)      Start     Stop Route Frequency Ordered    11/29/24 1700  meropenem injection 1 g         -- IV Every 12 hours (non-standard times) 11/29/24 1304          Latest lactate reviewed-  Recent Labs   Lab 11/28/24  1345   LACTATE 2.7*       Organ dysfunction indicated by Acute kidney injury    Fluid challenge Contraindicated- Fluid bolus is contraindicated in this patient due to End Stage Liver Disease     Post- resuscitation assessment No - Post resuscitation assessment not needed     Source control achieved by: antibiotics    Hematology  Thrombocytopenia  --Daily CBC  --Transfuse for PLT<10k, or PLT<50K with active bleeding  --Monitor for signs and symptoms of bleeding    Coagulopathy  End Stage Liver Disease precipitated coagulopathy  -heparin d/c d/t thrombocytopenia    Endocrine  Hyponatremia  Likely dilutional secondary to end stage liver disease and HRS    Will monitor for S&S of hyponatremia and correct when clinically appropriate.     GI  * Decompensated cirrhosis  MELD 3.0: 32 at 11/28/2024  2:50 AM  MELD-Na: 30 at 11/28/2024  2:50 AM  Calculated from:  Serum Creatinine: 3 mg/dL at 11/28/2024  2:50 AM  Serum Sodium: 133 mmol/L at 11/28/2024  2:50 AM  Total Bilirubin: 3.2 mg/dL at 11/28/2024  2:50 AM  Serum Albumin: 3 g/dL at 11/28/2024  2:50 AM  INR(ratio): 2 at 11/27/2024  2:09 AM  Age at listing (hypothetical): 69 years  Sex: Male at 11/28/2024  2:50 AM    --Hepatology following, pt not transplant eligible at this time.         Abdominal pain  Patient developed abdominal pain 2/2 constipation last week and NGT was placed. 11/30 NGT was removed. The day after the NGT was removed he developed constipation and dilated bowel loops on AXR    Plan:  -- brown bomb enema  -- if failed, NGT       Critical Care Daily  Checklist:    A: Awake: RASS Goal/Actual Goal: RASS Goal: 0-->alert and calm  Actual:     B: Spontaneous Breathing Trial Performed?     C: SAT & SBT Coordinated?  N/a                    D: Delirium: CAM-ICU Overall CAM-ICU: Negative   E: Early Mobility Performed? Yes   F: Feeding Goal: Goals: to meet % of EEN/EPN by next RD f/u  Status: Nutrition Goal Status: new   Current Diet Order   Procedures    Diet Clear Liquid Standard Tray     Order Specific Question:   Tray type:     Answer:   Standard Tray      AS: Analgesia/Sedation none   T: Thromboembolic Prophylaxis heparin   H: HOB > 300 Yes   U: Stress Ulcer Prophylaxis (if needed) yes   G: Glucose Control none   B: Bowel Function Stool Occurrence: 1   I: Indwelling Catheter (Lines & Boo) Necessity Foleys, trialysis, piv   D: De-escalation of Antimicrobials/Pharmacotherapies meropenem    Plan for the day/ETD Brown bomb enema to improve constipation    Code Status:  Family/Goals of Care: Full Code         Critical secondary to Patient has a condition that poses threat to life and bodily function: Acute Renal Failure      Critical care was time spent personally by me on the following activities: development of treatment plan with patient or surrogate and bedside caregivers, discussions with consultants, evaluation of patient's response to treatment, examination of patient, ordering and performing treatments and interventions, ordering and review of laboratory studies, ordering and review of radiographic studies, pulse oximetry, re-evaluation of patient's condition. This critical care time did not overlap with that of any other provider or involve time for any procedures.     Fatoumata Ervin MD  Critical Care Medicine  [facility] - Medical ICU

## 2024-12-01 NOTE — PROGRESS NOTES
"Nephrology Daily Progress Note    Assessment and plan  JAE likely 2/2 HRS   -s/p MAP goal increased for 6 days, now MAP goal > 65  -still poor UOP despite lasix challenge  -last SLED 11/30 early morning, will do another session SLED/SCUF 8/4 hours tonight with goal I/O net negative 500cc -1L     The patient is too sick to get a liver txp for now per Hepatology.      High risk    Subjective  No specific complaint  On levophed 0.03 mcg/min  SpO2 96% on N/C2L   cc after IV lasix 120 mg on 11/30    Objective  PHYSICAL EXAMINATION:  Blood pressure (!) 99/55, pulse 99, temperature 97.9 °F (36.6 °C), temperature source Axillary, resp. rate 12, height 6' 1" (1.854 m), weight (!) 148.6 kg (327 lb 9.7 oz), SpO2 96%.  Constitutional - ill looking  Respiratory - Coarse  Musculoskeletal - Peripheral edema 2+  "

## 2024-12-01 NOTE — PLAN OF CARE
MICU DAILY GOALS     Family/Goals of care/Code Status   Code Status: Full Code    24H Vital Sign Range  Temp:  [97.6 °F (36.4 °C)-98.3 °F (36.8 °C)]   Pulse:  []   Resp:  [11-30]   BP: ()/(52-57)   SpO2:  [91 %-98 %]   Arterial Line BP: (101-137)/(34-53)      Shift Events (include procedures and significant events)   No acute events throughout shift    AWAKE RASS: Goal - RASS Goal: 0-->alert and calm  Actual - RASS (Pedraza Agitation-Sedation Scale): alert and calm    Restraint necessity: Not necessary   BREATHE SBT: Not intubated    Coordinate A & B, analgesics/sedatives Pain: managed   SAT: Not intubated   Delirium CAM-ICU: Overall CAM-ICU: Negative   Early(intubated/ Progressive (non-intubated) Mobility MOVE Screen (INTUBATED ONLY): Not intubated    Activity: Activity Management: Rolling - L1   Feeding/Nutrition Diet order: Diet/Nutrition Received: clear liquid, Specialty Diet/Nutrition Received: renal diet   Thrombus DVT prophylaxis: VTE Core Measure: (TEDs) Compression stocking therapy initiated/maintained   HOB Elevation Head of Bed (HOB) Positioning: HOB at 20-30 degrees   Ulcer Prophylaxis GI: yes   Glucose control managed Glycemic Management: blood glucose monitored   Skin Skin assessment:     Sacrum intact/not altered? Yes  Heels intact/not altered? Yes  Surgical wound? No    CHECK ONE!   (no altered skin or altered skin) and sub boxes:  [] No Altered Skin Integrity Present    []Prevention Measures Documented    [x] Altered Skin Integrity Present or Discovered   [x] LDA present in EPIC, daily doc completed              [] LDA added if not in EPIC (describe wound).                    When describing wound, do not stage, use descriptive words only.    [] Wound Image Taken (required on admit,                   transfer/discharge and every Tuesday)    Wound Care Consulted? No   Bowel Function no issues. Last bowel movement 11/29   Indwelling Catheter Necessity      Urethral Catheter 11/26/24  1738-Reason for Continuing Urinary Catheterization: Urinary retention, Critically ill in ICU and requiring hourly monitoring of intake/output  [REMOVED]      Urethral Catheter 11/20/24 1802 Double-lumen-Reason for Continuing Urinary Catheterization: Urinary retention    Trialysis (Dialysis) Catheter 11/24/24 0441 right internal jugular-Line Necessity Review: CRRT/HD     De-escalation Antibiotics Yes        VS and assessment per flow sheet, patient progressing towards goals as tolerated, plan of care reviewed with Juan Carlos Yoo, all concerns addressed, will continue to monitor.

## 2024-12-01 NOTE — ASSESSMENT & PLAN NOTE
Patient developed abdominal pain 2/2 constipation last week and NGT was placed. 11/30 NGT was removed. The day after the NGT was removed he developed constipation and dilated bowel loops on AXR    Plan:  -- brown bomb enema  -- if failed, NGT

## 2024-12-01 NOTE — PROGRESS NOTES
CRRT restarted, Bfr 200/DfR 200, uf initially set at 300. B/P 114/42, pulse 101, patient nasal cannula 1 liter, o2 sat 97%. Patient awake and alert

## 2024-12-02 PROBLEM — Z71.89 ADVANCED CARE PLANNING/COUNSELING DISCUSSION: Status: ACTIVE | Noted: 2024-12-02

## 2024-12-02 PROBLEM — R53.81 DEBILITY: Status: ACTIVE | Noted: 2024-12-02

## 2024-12-02 PROBLEM — Z51.5 PALLIATIVE CARE ENCOUNTER: Status: ACTIVE | Noted: 2024-12-02

## 2024-12-02 LAB
ALBUMIN SERPL BCP-MCNC: 2.4 G/DL (ref 3.5–5.2)
ALBUMIN SERPL BCP-MCNC: 2.5 G/DL (ref 3.5–5.2)
ALP SERPL-CCNC: 41 U/L (ref 40–150)
ALP SERPL-CCNC: 44 U/L (ref 40–150)
ALT SERPL W/O P-5'-P-CCNC: 8 U/L (ref 10–44)
ALT SERPL W/O P-5'-P-CCNC: 8 U/L (ref 10–44)
ANION GAP SERPL CALC-SCNC: 4 MMOL/L (ref 8–16)
ANION GAP SERPL CALC-SCNC: 6 MMOL/L (ref 8–16)
ANION GAP SERPL CALC-SCNC: 9 MMOL/L (ref 8–16)
AST SERPL-CCNC: 14 U/L (ref 10–40)
AST SERPL-CCNC: 14 U/L (ref 10–40)
BASOPHILS # BLD AUTO: 0.01 K/UL (ref 0–0.2)
BASOPHILS NFR BLD: 0.1 % (ref 0–1.9)
BILIRUB DIRECT SERPL-MCNC: 1.6 MG/DL (ref 0.1–0.3)
BILIRUB SERPL-MCNC: 3.4 MG/DL (ref 0.1–1)
BILIRUB SERPL-MCNC: 3.8 MG/DL (ref 0.1–1)
BUN SERPL-MCNC: 12 MG/DL (ref 8–23)
BUN SERPL-MCNC: 13 MG/DL (ref 8–23)
BUN SERPL-MCNC: 15 MG/DL (ref 8–23)
BUN SERPL-MCNC: 9 MG/DL (ref 8–23)
CALCIUM SERPL-MCNC: 7.4 MG/DL (ref 8.7–10.5)
CALCIUM SERPL-MCNC: 7.8 MG/DL (ref 8.7–10.5)
CALCIUM SERPL-MCNC: 7.9 MG/DL (ref 8.7–10.5)
CHLORIDE SERPL-SCNC: 104 MMOL/L (ref 95–110)
CHLORIDE SERPL-SCNC: 104 MMOL/L (ref 95–110)
CHLORIDE SERPL-SCNC: 107 MMOL/L (ref 95–110)
CO2 SERPL-SCNC: 19 MMOL/L (ref 23–29)
CO2 SERPL-SCNC: 20 MMOL/L (ref 23–29)
CO2 SERPL-SCNC: 21 MMOL/L (ref 23–29)
CO2 SERPL-SCNC: 23 MMOL/L (ref 23–29)
CREAT SERPL-MCNC: 1.7 MG/DL (ref 0.5–1.4)
CREAT SERPL-MCNC: 2 MG/DL (ref 0.5–1.4)
CREAT SERPL-MCNC: 2.2 MG/DL (ref 0.5–1.4)
CREAT SERPL-MCNC: 2.4 MG/DL (ref 0.5–1.4)
DIFFERENTIAL METHOD BLD: ABNORMAL
EOSINOPHIL # BLD AUTO: 0.2 K/UL (ref 0–0.5)
EOSINOPHIL NFR BLD: 1.9 % (ref 0–8)
ERYTHROCYTE [DISTWIDTH] IN BLOOD BY AUTOMATED COUNT: 23.5 % (ref 11.5–14.5)
EST. GFR  (NO RACE VARIABLE): 28.1 ML/MIN/1.73 M^2
EST. GFR  (NO RACE VARIABLE): 31.2 ML/MIN/1.73 M^2
EST. GFR  (NO RACE VARIABLE): 35 ML/MIN/1.73 M^2
EST. GFR  (NO RACE VARIABLE): 42.6 ML/MIN/1.73 M^2
GLUCOSE SERPL-MCNC: 104 MG/DL (ref 70–110)
GLUCOSE SERPL-MCNC: 112 MG/DL (ref 70–110)
GLUCOSE SERPL-MCNC: 123 MG/DL (ref 70–110)
GLUCOSE SERPL-MCNC: 97 MG/DL (ref 70–110)
HCT VFR BLD AUTO: 23.4 % (ref 40–54)
HGB BLD-MCNC: 7.6 G/DL (ref 14–18)
IMM GRANULOCYTES # BLD AUTO: 0.05 K/UL (ref 0–0.04)
IMM GRANULOCYTES NFR BLD AUTO: 0.5 % (ref 0–0.5)
LYMPHOCYTES # BLD AUTO: 0.5 K/UL (ref 1–4.8)
LYMPHOCYTES NFR BLD: 5 % (ref 18–48)
MAGNESIUM SERPL-MCNC: 1.9 MG/DL (ref 1.6–2.6)
MAGNESIUM SERPL-MCNC: 2.1 MG/DL (ref 1.6–2.6)
MAGNESIUM SERPL-MCNC: 2.2 MG/DL (ref 1.6–2.6)
MCH RBC QN AUTO: 25 PG (ref 27–31)
MCHC RBC AUTO-ENTMCNC: 32.5 G/DL (ref 32–36)
MCV RBC AUTO: 77 FL (ref 82–98)
MONOCYTES # BLD AUTO: 1.3 K/UL (ref 0.3–1)
MONOCYTES NFR BLD: 13.7 % (ref 4–15)
NEUTROPHILS # BLD AUTO: 7.3 K/UL (ref 1.8–7.7)
NEUTROPHILS NFR BLD: 78.8 % (ref 38–73)
NRBC BLD-RTO: 0 /100 WBC
PHOSPHATE SERPL-MCNC: 2.2 MG/DL (ref 2.7–4.5)
PHOSPHATE SERPL-MCNC: 2.4 MG/DL (ref 2.7–4.5)
PHOSPHATE SERPL-MCNC: 2.5 MG/DL (ref 2.7–4.5)
PLATELET # BLD AUTO: 45 K/UL (ref 150–450)
PMV BLD AUTO: ABNORMAL FL (ref 9.2–12.9)
POTASSIUM SERPL-SCNC: 3.6 MMOL/L (ref 3.5–5.1)
POTASSIUM SERPL-SCNC: 3.7 MMOL/L (ref 3.5–5.1)
POTASSIUM SERPL-SCNC: 3.8 MMOL/L (ref 3.5–5.1)
PROT SERPL-MCNC: 5.2 G/DL (ref 6–8.4)
PROT SERPL-MCNC: 5.3 G/DL (ref 6–8.4)
RBC # BLD AUTO: 3.04 M/UL (ref 4.6–6.2)
SODIUM SERPL-SCNC: 131 MMOL/L (ref 136–145)
SODIUM SERPL-SCNC: 131 MMOL/L (ref 136–145)
SODIUM SERPL-SCNC: 135 MMOL/L (ref 136–145)
SODIUM SERPL-SCNC: 136 MMOL/L (ref 136–145)
WBC # BLD AUTO: 9.26 K/UL (ref 3.9–12.7)

## 2024-12-02 PROCEDURE — 80069 RENAL FUNCTION PANEL: CPT | Mod: 91 | Performed by: STUDENT IN AN ORGANIZED HEALTH CARE EDUCATION/TRAINING PROGRAM

## 2024-12-02 PROCEDURE — 97110 THERAPEUTIC EXERCISES: CPT

## 2024-12-02 PROCEDURE — 99233 SBSQ HOSP IP/OBS HIGH 50: CPT | Mod: ,,, | Performed by: INTERNAL MEDICINE

## 2024-12-02 PROCEDURE — 97530 THERAPEUTIC ACTIVITIES: CPT

## 2024-12-02 PROCEDURE — 99900035 HC TECH TIME PER 15 MIN (STAT)

## 2024-12-02 PROCEDURE — 80100008 HC CRRT DAILY MAINTENANCE

## 2024-12-02 PROCEDURE — 25000003 PHARM REV CODE 250: Performed by: STUDENT IN AN ORGANIZED HEALTH CARE EDUCATION/TRAINING PROGRAM

## 2024-12-02 PROCEDURE — 25000003 PHARM REV CODE 250

## 2024-12-02 PROCEDURE — 25000003 PHARM REV CODE 250: Performed by: INTERNAL MEDICINE

## 2024-12-02 PROCEDURE — 83735 ASSAY OF MAGNESIUM: CPT | Mod: 91 | Performed by: STUDENT IN AN ORGANIZED HEALTH CARE EDUCATION/TRAINING PROGRAM

## 2024-12-02 PROCEDURE — 63600175 PHARM REV CODE 636 W HCPCS: Performed by: INTERNAL MEDICINE

## 2024-12-02 PROCEDURE — 85025 COMPLETE CBC W/AUTO DIFF WBC: CPT

## 2024-12-02 PROCEDURE — 83735 ASSAY OF MAGNESIUM: CPT

## 2024-12-02 PROCEDURE — 63600175 PHARM REV CODE 636 W HCPCS

## 2024-12-02 PROCEDURE — 99497 ADVNCD CARE PLAN 30 MIN: CPT | Mod: 25,,, | Performed by: CLINICAL NURSE SPECIALIST

## 2024-12-02 PROCEDURE — 90945 DIALYSIS ONE EVALUATION: CPT

## 2024-12-02 PROCEDURE — 80053 COMPREHEN METABOLIC PANEL: CPT

## 2024-12-02 PROCEDURE — 99223 1ST HOSP IP/OBS HIGH 75: CPT | Mod: ,,, | Performed by: CLINICAL NURSE SPECIALIST

## 2024-12-02 PROCEDURE — 20000000 HC ICU ROOM

## 2024-12-02 PROCEDURE — 80076 HEPATIC FUNCTION PANEL: CPT

## 2024-12-02 PROCEDURE — 99291 CRITICAL CARE FIRST HOUR: CPT | Mod: ,,, | Performed by: INTERNAL MEDICINE

## 2024-12-02 PROCEDURE — 84100 ASSAY OF PHOSPHORUS: CPT

## 2024-12-02 PROCEDURE — 94761 N-INVAS EAR/PLS OXIMETRY MLT: CPT

## 2024-12-02 RX ORDER — HYDROCODONE BITARTRATE AND ACETAMINOPHEN 500; 5 MG/1; MG/1
TABLET ORAL CONTINUOUS
Status: ACTIVE | OUTPATIENT
Start: 2024-12-02 | End: 2024-12-03

## 2024-12-02 RX ORDER — MEROPENEM 1 G/1
1 INJECTION, POWDER, FOR SOLUTION INTRAVENOUS
Status: COMPLETED | OUTPATIENT
Start: 2024-12-02 | End: 2024-12-08

## 2024-12-02 RX ORDER — MAGNESIUM SULFATE HEPTAHYDRATE 40 MG/ML
2 INJECTION, SOLUTION INTRAVENOUS
Status: ACTIVE | OUTPATIENT
Start: 2024-12-02 | End: 2024-12-03

## 2024-12-02 RX ADMIN — SODIUM PHOSPHATE, MONOBASIC, MONOHYDRATE AND SODIUM PHOSPHATE, DIBASIC, ANHYDROUS 30 MMOL: 142; 276 INJECTION, SOLUTION INTRAVENOUS at 06:12

## 2024-12-02 RX ADMIN — LACTULOSE 30 G: 20 SOLUTION ORAL at 08:12

## 2024-12-02 RX ADMIN — SODIUM CHLORIDE: 9 INJECTION, SOLUTION INTRAVENOUS at 05:12

## 2024-12-02 RX ADMIN — MIDODRINE HYDROCHLORIDE 15 MG: 5 TABLET ORAL at 03:12

## 2024-12-02 RX ADMIN — PANTOPRAZOLE SODIUM 40 MG: 40 TABLET, DELAYED RELEASE ORAL at 08:12

## 2024-12-02 RX ADMIN — MIDODRINE HYDROCHLORIDE 15 MG: 5 TABLET ORAL at 08:12

## 2024-12-02 RX ADMIN — SODIUM CHLORIDE: 9 INJECTION, SOLUTION INTRAVENOUS at 09:12

## 2024-12-02 RX ADMIN — NOREPINEPHRINE BITARTRATE 0.04 MCG/KG/MIN: 16 SOLUTION INTRAVENOUS at 09:12

## 2024-12-02 RX ADMIN — NOREPINEPHRINE BITARTRATE 0.04 MCG/KG/MIN: 16 SOLUTION INTRAVENOUS at 02:12

## 2024-12-02 RX ADMIN — LACTULOSE 30 G: 20 SOLUTION ORAL at 03:12

## 2024-12-02 RX ADMIN — TAMSULOSIN HYDROCHLORIDE 0.4 MG: 0.4 CAPSULE ORAL at 09:12

## 2024-12-02 RX ADMIN — HEPARIN SODIUM 5000 UNITS: 5000 INJECTION INTRAVENOUS; SUBCUTANEOUS at 05:12

## 2024-12-02 RX ADMIN — MEROPENEM 1 G: 1 INJECTION, POWDER, FOR SOLUTION INTRAVENOUS at 06:12

## 2024-12-02 RX ADMIN — MIDODRINE HYDROCHLORIDE 15 MG: 5 TABLET ORAL at 09:12

## 2024-12-02 RX ADMIN — MEROPENEM 2 G: 2 INJECTION, POWDER, FOR SOLUTION INTRAVENOUS at 05:12

## 2024-12-02 NOTE — ASSESSMENT & PLAN NOTE
Daily CBC  Transfuse for PLT<10k, or PLT<50K with active bleeding  Monitor for signs and symptoms of bleeding

## 2024-12-02 NOTE — HPI
Juan Carlos Yoo is a 69 male with PMH of alcoholic cirrhosis, esophageal varices, Afib on eliquis, and HTN who presents for c/o worsening diffuse swelling as well as R arm pain/erythema. Per chart review, pt was recently hospitalized 1 month ago at [facility] for volume overload in the setting of decompensated cirrhosis. He was treated with IV diuresis and discharged with adjustment to his diuretics, currently on lasix 60mg BID and metolazone 2.5mg every other week as per family. Patient reports diffuse swelling of his upper and lower extremities in addition to distended abdomen and worsening dyspnea, which he believes is due to recent medication adjustment. Family states he is non-compliant with low sodium diet and fluid restriction. Family states he has been compliant with diuretics, but rom't taken eliquis for 3 days due to issue getting refill. He also reports scratching right arm on door frame 3-4 days ago which has become red and painful after wrapping in in bandage. He claims to be compliant with medications, but is a poor historian. He follows with hepatology, not currently active on transplant list. He states he hasn't consumed alcohol for at least 9 months. Has c/o chills, but denies fever, cough, nausea, vomiting, chest pain, dysuria, hematuria, blood in stool. Workup in ED remarkable for VS: T 97.6 HR 94 RR 22 BP 95/56 O2 sat 97% on RA. Hb 6.7, MCV 75, Plt 81, PT/INR 22.6/2.2, Na 128, K 6.1, BUN/Cr 49/5.7, Alb 2.8, AST/ALT 35/9, Ammonia 104, , Trop neg, LA 2.9, CXR: Cardiac size is enlarged similar to prior.  No large volume of pleural fluid noted although there is mild blunting of the right costophrenic angle which may relate to a small amount of pleural fluid.  Suspected right basilar opacity, to be correlated clinically for infection. RUE venous doppler: No thrombus in central veins of the right upper extremity. Patient received vanc/zosyn and lasix 80mg IVP in ED and will be admitted to hospital  medicine service for further management.     Pt was admitted to hospital medicine. Blood cultures positive for Gram-positive cocci and Gram-negative rods. ID has been consulted. S/p 2 units PRBC for Hemoglobin dropped 6.8 on 11/21. Pt was on Eliquis for atrial fibrillation prior to admission which was held.  Hepatology following patient's clinical course, but are not evaluating him for transplant at this time. Diuretics were held because of concern for HRS.  Patient was started on albumin and midodrine per Nephrology recommendations for HRS.  Renal function continued to worsen and recommendation per Nephrology to transfer to ICU for maintaining goal map over 85 on Levophed.     Pt off pressor support and on dialysis.

## 2024-12-02 NOTE — SUBJECTIVE & OBJECTIVE
Interval History: Pt has alcoholic cirrhosis.     Past Medical History:   Diagnosis Date    Atrial fibrillation     Bulging disc     Cirrhosis     Colon polyp 12/29/2017    Rpt 5 yrs    EV (esophageal varices)     Hypertension 3/30/2023    Skin cancer 03/2017    Thrombocytopenia        Past Surgical History:   Procedure Laterality Date    CATHETERIZATION OF BOTH LEFT AND RIGHT HEART N/A 2/8/2023    Procedure: CATHETERIZATION, HEART, BOTH LEFT AND RIGHT;  Surgeon: Flo Malone MD;  Location: [facility] CATH LAB;  Service: Cardiology;  Laterality: N/A;  low bleeding risk 1.0%    CHOLECYSTECTOMY      COLONOSCOPY      COLONOSCOPY N/A 12/29/2017    ta rpt 2022    CORONARY ANGIOGRAPHY N/A 2/8/2023    Procedure: ANGIOGRAM, CORONARY ARTERY;  Surgeon: Flo Malone MD;  Location: [facility] CATH LAB;  Service: Cardiology;  Laterality: N/A;    HERNIA REPAIR      SKIN BIOPSY  03/2017    TONSILLECTOMY      UPPER GASTROINTESTINAL ENDOSCOPY  2011    EV    UPPER GASTROINTESTINAL ENDOSCOPY  2016    VASECTOMY         Review of patient's allergies indicates:  No Known Allergies    Medications:  Continuous Infusions:   sodium chloride 0.9%   Intravenous Continuous   Stopped at 12/02/24 0326    NORepinephrine bitartrate-D5W  0-3 mcg/kg/min Intravenous Continuous 22.2 mL/hr at 12/02/24 1101 0.04 mcg/kg/min at 12/02/24 1101     Scheduled Meds:   lactulose  30 g Oral TID    meropenem IV (PEDS and ADULTS)  2 g Intravenous Q12H    midodrine  15 mg Oral TID    pantoprazole  40 mg Oral Daily    tamsulosin  1 capsule Oral QHS     PRN Meds:  Current Facility-Administered Medications:     albuterol-ipratropium, 3 mL, Nebulization, Q6H PRN    aluminum-magnesium hydroxide-simethicone, 30 mL, Oral, QID PRN    dextrose 10%, 12.5 g, Intravenous, PRN    dextrose 10%, 25 g, Intravenous, PRN    glucagon (human recombinant), 1 mg, Intramuscular, PRN    glucose, 16 g, Oral, PRN    glucose, 24 g, Oral, PRN    melatonin, 6 mg, Oral, Nightly PRN    naloxone,  0.02 mg, Intravenous, PRN    ondansetron, 4 mg, Intravenous, Q8H PRN    simethicone, 1 tablet, Oral, QID PRN    sodium chloride 0.9%, 10 mL, Intravenous, Q12H PRN    sodium chloride 0.9%, 10 mL, Intravenous, PRN    Family History       Problem Relation (Age of Onset)    Cancer Maternal Uncle    Heart disease Maternal Uncle    Hyperlipidemia Brother    Ovarian cancer Sister          Tobacco Use    Smoking status: Never    Smokeless tobacco: Never   Substance and Sexual Activity    Alcohol use: Not Currently     Alcohol/week: 0.0 standard drinks of alcohol    Drug use: No    Sexual activity: Not on file       Review of Systems   Constitutional:  Positive for activity change.   Respiratory:  Negative for shortness of breath.    Cardiovascular:  Positive for leg swelling.   Gastrointestinal:  Positive for constipation.   Neurological:  Positive for weakness.     Objective:     Vital Signs (Most Recent):  Temp: 98 °F (36.7 °C) (12/02/24 1101)  Pulse: 104 (12/02/24 1146)  Resp: 14 (12/02/24 1146)  BP: (!) 99/55 (12/01/24 0645)  SpO2: 97 % (12/02/24 1146) Vital Signs (24h Range):  Temp:  [97.7 °F (36.5 °C)-98.1 °F (36.7 °C)] 98 °F (36.7 °C)  Pulse:  [] 104  Resp:  [10-28] 14  SpO2:  [92 %-99 %] 97 %  Arterial Line BP: ()/(31-64) 114/39     Weight: (!) 149 kg (328 lb 7.8 oz)  Body mass index is 43.34 kg/m².       Physical Exam  Constitutional:       Appearance: He is overweight.   HENT:      Head: Normocephalic and atraumatic.   Pulmonary:      Effort: Pulmonary effort is normal.   Musculoskeletal:      Comments: Edema noted to extremities.    Skin:     General: Skin is warm and dry.   Neurological:      Mental Status: He is alert and oriented to person, place, and time.   Psychiatric:         Behavior: Behavior is cooperative.            Review of Symptoms      Symptom Assessment (ESAS 0-10 Scale)  Pain:  0  Dyspnea:  0  Anxiety:  0  Nausea:  0  Depression:  0  Anorexia:  0  Fatigue:  0  Insomnia:   0  Restlessness:  0  Agitation:  0       Constipation:  Constipation    Psychosocial/Cultural:   See Palliative Psychosocial Note: Yes  **Primary  to Follow**  Palliative Care  Consult: Yes        Advance Care Planning   Advance Directives:   Living Will: No    LaPOST: No    Do Not Resuscitate Status: No    Medical Power of : No    Agent's Name:  Pt reports he has fille dout MPOA and his son is MPOCECILIA.    Decision Making:  Patient answered questions  Goals of Care: The patient endorses that what is most important right now is to focus on remaining as independent as possible    Accordingly, we have decided that the best plan to meet the patient's goals includes continuing with treatment         Significant Labs: All pertinent labs within the past 24 hours have been reviewed.  CBC:   Recent Labs   Lab 12/02/24  0336   WBC 9.26   HGB 7.6*   HCT 23.4*   MCV 77*   PLT 45*     BMP:  Recent Labs   Lab 12/02/24  0858   GLU 97   *   K 3.8      CO2 23   BUN 13   CREATININE 2.2*   CALCIUM 7.8*   MG 2.2     LFT:  Lab Results   Component Value Date    AST 14 12/02/2024    GGT 30 12/13/2022    ALKPHOS 41 12/02/2024    BILITOT 3.4 (H) 12/02/2024     Albumin:   Albumin   Date Value Ref Range Status   12/02/2024 2.5 (L) 3.5 - 5.2 g/dL Final   12/02/2024 2.4 (L) 3.5 - 5.2 g/dL Final     Protein:   Total Protein   Date Value Ref Range Status   12/02/2024 5.2 (L) 6.0 - 8.4 g/dL Final     Lactic acid:   Lab Results   Component Value Date    LACTATE 2.7 (H) 11/28/2024    LACTATE 2.9 (H) 11/28/2024       Significant Imaging: I have reviewed all pertinent imaging results/findings within the past 24 hours.

## 2024-12-02 NOTE — ASSESSMENT & PLAN NOTE
Blood cultures from admission with B. Diminuta, micrococcus luteus, lysinobacillus species. Seen by ID previously who recommended stopping antibiotics due to concern these were contaminants. Repeat blood cultures have been no growth. Has since been transferred to ICU with increased pressor requirement. Blood cultures repeated on 11/24 are no growth x 24 hours.   11/29 Switched from zosyn to meropenem due to concern of thrombocytopenia.   Continue meropenem.  ID consulted for recommendations regarding duration.

## 2024-12-02 NOTE — SUBJECTIVE & OBJECTIVE
Interval History/Significant Events: NAEON, Patient reports feeling better, reported having a bowel movement after brown bomb enema.     Denies HA, fevers, chills, chest pain, palpitations, SOB, N/V.     Review of Systems  Objective:     Vital Signs (Most Recent):  Temp: 97.7 °F (36.5 °C) (12/02/24 0701)  Pulse: 94 (12/02/24 0830)  Resp: 15 (12/02/24 0816)  BP: (!) 99/55 (12/01/24 0645)  SpO2: 95 % (12/02/24 0816) Vital Signs (24h Range):  Temp:  [97.7 °F (36.5 °C)-98.1 °F (36.7 °C)] 97.7 °F (36.5 °C)  Pulse:  [] 94  Resp:  [10-28] 15  SpO2:  [92 %-99 %] 95 %  Arterial Line BP: (101-141)/(31-64) 109/39   Weight: (!) 149 kg (328 lb 7.8 oz)  Body mass index is 43.34 kg/m².      Intake/Output Summary (Last 24 hours) at 12/2/2024 0954  Last data filed at 12/2/2024 0826  Gross per 24 hour   Intake 3463.53 ml   Output 4039 ml   Net -575.47 ml          Physical Exam  Vitals and nursing note reviewed.   Constitutional:       General: He is awake. He is not in acute distress.     Appearance: He is obese. He is ill-appearing. He is not toxic-appearing.   HENT:      Head: Normocephalic and atraumatic.   Eyes:      General: No scleral icterus.     Extraocular Movements: Extraocular movements intact.      Conjunctiva/sclera: Conjunctivae normal.   Cardiovascular:      Rate and Rhythm: Tachycardia present. Rhythm irregular.   Pulmonary:      Effort: Pulmonary effort is normal. No respiratory distress.   Abdominal:      General: There is distension.      Palpations: Abdomen is soft.      Tenderness: There is no abdominal tenderness. There is no guarding or rebound.   Musculoskeletal:         General: No swelling or tenderness.      Right lower leg: Edema present.      Left lower leg: Edema present.      Comments: 2+ pitting edema in bilateral lower extremities   Skin:     General: Skin is warm.      Coloration: Skin is not jaundiced.      Findings: Bruising present.   Neurological:      Mental Status: He is alert and  oriented to person, place, and time.      Motor: No weakness.            Vents:     Lines/Drains/Airways       Central Venous Catheter Line  Duration             Trialysis (Dialysis) Catheter 11/24/24 0441 right internal jugular 8 days              Drain  Duration                  Urethral Catheter 11/26/24 1738 5 days              Arterial Line  Duration             Arterial Line 11/24/24 0317 Right Radial 8 days              Peripheral Intravenous Line  Duration                  Peripheral IV - Single Lumen 11/27/24 1101 20 G Anterior;Left Forearm 4 days                  Significant Labs:    CBC/Anemia Profile:  Recent Labs   Lab 12/01/24 0227 12/02/24  0336   WBC 10.22 9.26   HGB 7.7* 7.6*   HCT 23.6* 23.4*   PLT 42* 45*   MCV 77* 77*   RDW 23.4* 23.5*        Chemistries:  Recent Labs   Lab 12/01/24 0227 12/01/24  0838 12/01/24  1542 12/01/24 2217 12/02/24  0336   *  --  132*  132*  132*  132*  132*  132* 131* 131*   K 3.9  --  3.9  3.9  3.9  3.9  3.9  3.9 3.8 3.7     --  103  103  103  103  103  103 104 104   CO2 22*  --  22*  22*  22*  22*  22*  22* 23 21*   BUN 19  --  20  20  20  20  20  20 10 12   CREATININE 3.1*  --  3.1*  3.1*  3.1*  3.1*  3.1*  3.1* 1.8* 2.0*   CALCIUM 8.4*  --  8.2*  8.2*  8.2*  8.2*  8.2*  8.2* 7.8* 7.9*   ALBUMIN 2.6* 2.7* 2.4*  2.4*  2.4*  2.4*  2.4*  2.4* 2.6* 2.5*   PROT 5.6* 5.5*  --   --  5.3*   BILITOT 3.0* 3.1*  --   --  3.8*   ALKPHOS 46 42  --   --  44   ALT 11 11  --   --  8*   AST 15 14  --   --  14   MG 2.1  --  1.9  1.9  1.9  1.9  1.9  1.9 1.8 2.1   PHOS 3.8  --  3.5  3.5  3.5  3.5  3.5  3.5 2.1* 2.2*       All pertinent labs within the past 24 hours have been reviewed.    Significant Imaging:  I have reviewed all pertinent imaging results/findings within the past 24 hours.

## 2024-12-02 NOTE — PROGRESS NOTES
12/01/24 2323   Treatment   Treatment Type SCUF   Treatment Status Order change   Dialysis Machine Number [id number]   Dialyzer Time (hours) 8.07   BVP (Liters) 96.5 L   Solutions Labeled and Current  Yes   Access Temporary Cath;Right;IJ   Catheter Dressing Intact  Yes   Alarms Engaged Yes   CRRT Comments SLED switched to SCUF     8 hours SLED completed. Treatment switched to 4 hour SCUF per MD orders

## 2024-12-02 NOTE — PT/OT/SLP PROGRESS
Occupational Therapy   Treatment    Name: Juan Carlos Yoo Sr.  MRN: [id number]  Admitting Diagnosis:  Decompensated cirrhosis       Recommendations:     Discharge Recommendations: Moderate Intensity Therapy  Discharge Equipment Recommendations:   (to be determined)  Barriers to discharge:  Decreased caregiver support    Assessment:     Juan Carlos Yoo Sr. is a 69 y.o. male with a medical diagnosis of Decompensated cirrhosis. Performance deficits affecting function are weakness, impaired endurance, impaired self care skills, impaired functional mobility, gait instability, impaired balance. Prior to session, patient on 0.04 Levo with MAP ranging 57-65 with MAP goal 60-65. RN titrated Levo up to 0.06, MAPs increased to mid to high 60s prior to session and HR in low 120s bpm. Upon sitting EOB, pt with HR increase to 150s bpm with good reading on monitor, MAPs in low 60s. Pt sat for ~1 minute with HR not changing. Patient returned to supine and HR returned to 120s bpm after a few minutes and  MAP within goal. RN in room and aware. Patient would benefit from continued skilled acute OT 4x/wk to improve functional mobility, increase independence with ADLs, and address established goals. Recommending moderate intensity therapy once medically appropriate for discharge to increase maximal independence, reduce burden of care, and ensure safety.     Rehab Prognosis:  Good; patient would benefit from acute skilled OT services to address these deficits and reach maximum level of function.       Plan:     Patient to be seen 4 x/week to address the above listed problems via self-care/home management, therapeutic activities, therapeutic exercises  Plan of Care Expires: 12/27/24  Plan of Care Reviewed with: patient    Subjective     Chief Complaint: none noted  Patient/Family Comments/goals: Patient agreed to therapy  Pain/Comfort:  Pain Rating 1: 0/10  Pain Rating Post-Intervention 1: 0/10    Objective:     Communicated with: JESUS prior to  session.  Patient found HOB elevated with blood pressure cuff, telemetry, pulse ox (continuous), arterial line, Trialysis, peripheral IV, pressure relief boots, SCD, PureWick upon OT entry to room.    General Precautions: Standard, fall    Orthopedic Precautions:N/A  Braces: N/A  Respiratory Status: Room air     Occupational Performance:     Bed Mobility:    Patient completed Scooting/Bridging with total assistance and 2 persons anteriorly to EOB sitting EOB; total assistance of 2 persons to HOB lying supine  Patient completed Supine to Sit with total assistance and 2 persons with HOB elevated  Patient completed Sit to Supine with total assistance and 2 persons with HOB elevated    Activities of Daily Living:  Lower Body Dressing: total assistance Donning and doffing socks    [facility] 6 Click ADL: 11    Treatment & Education:  Role of OT and POC  ADL retraining  Functional mobility training  Safety  Importance EOB/OOB activity    Patient needed max A for sitting balance when EOB    Co-treatment performed due to patient's multiple deficits requiring two skilled therapists to appropriately and safely assess patient's strength and endurance while facilitating functional tasks in addition to accommodating for patient's activity tolerance.     Patient left HOB elevated with all lines intact, call button in reach, RN present, and all needs met.  SCDs and bilateral pressure relief boots reapplied.     GOALS:   Multidisciplinary Problems       Occupational Therapy Goals          Problem: Occupational Therapy    Goal Priority Disciplines Outcome Interventions   Occupational Therapy Goal     OT, PT/OT Progressing    Description: Goals to be met by: 12/25/24     Patient will increase functional independence with ADLs by performing:    UE Dressing with Set-up Assistance.  LE Dressing with Stand-by Assistance.  Grooming while standing at sink with Stand-by Assistance.  Toileting from toilet with Stand-by Assistance for hygiene and  clothing management.   Step transfer: with SBA and use of LRAD  Supine to sit with SBA  Toilet transfer to toilet with SBA and use of LRAD.  Independent with HEP to BUE to improve ROM                         Time Tracking:     OT Date of Treatment: 12/02/24  OT Start Time: 1355  OT Stop Time: 1419  OT Total Time (min): 24 min    Billable Minutes:Therapeutic Activity 10               12/2/2024

## 2024-12-02 NOTE — ASSESSMENT & PLAN NOTE
"This patient does have evidence of infective focus  My overall impression is sepsis.  Source: Abdominal  Antibiotics given-   Antibiotics (72h ago, onward)      Start     Stop Route Frequency Ordered    12/02/24 1800  meropenem injection 1 g         -- IV Every 12 hours (non-standard times) 12/02/24 1517          Latest lactate reviewed-  No results for input(s): "LACTATE", "POCLAC" in the last 72 hours.    Organ dysfunction indicated by Acute kidney injury    Fluid challenge Contraindicated- Fluid bolus is contraindicated in this patient due to End Stage Liver Disease     Post- resuscitation assessment No - Post resuscitation assessment not needed     Source control achieved by: antibiotics  ID consulted for recommendations regarding abx duration.  "

## 2024-12-02 NOTE — ASSESSMENT & PLAN NOTE
Patient developed abdominal pain 2/2 constipation last week and NGT was placed. 11/30 NGT was removed. The day after the NGT was removed he developed constipation and dilated bowel loops on AXR.  Some BM after brown bomb enema.   Continue to monitor

## 2024-12-02 NOTE — PLAN OF CARE
Advance Care Planning   [facility] - Medical ICU  Palliative Care   Psychosocial Assessment    Patient Name: Juan Carlos Yoo Sr.  MRN: [id number]  Admission Date: 11/20/2024  Hospital Length of Stay: 12 days  Code Status: Full Code   Attending Provider: Evelyn Amos MD  Palliative Care Provider:   Primary Care Physician: Nyla Rios FNP  Principal Problem:Decompensated cirrhosis    Reason for Referral: assistance with clarification of goals of care  Consult Order Date:   Primary CM/SW:    Present during Interview: patient, past medical records, and ER records.      Primary Language:English   Needed: no      Past Medical Situation:   PMH:   Past Medical History:   Diagnosis Date    Atrial fibrillation     Bulging disc     Cirrhosis     Colon polyp 12/29/2017    Rpt 5 yrs    EV (esophageal varices)     Hypertension 3/30/2023    Skin cancer 03/2017    Thrombocytopenia      Mental Health/Substance Use History: pt has a longstanding AUD   Risk of Abuse, neglect or exploitation: none disclosed   Current or Previous Trauma and/or evidence of PTSD: none disclosed   Non-traditional Health practices: none disclosed     Understanding of diagnosis and prognosis: moderate   Experience/Comfort level with health care system: no issues identified     Patients Mental Status: AAOx3 with some minor confusion noted     Socio-Economic Factors/Resources:  Address: [place] [address]  Phone Number: [phone] (home)     Marital Status: Single  Household composition: pt lives alone   Children: 1 son, 2 dtrs    Patient/Family perceptions about Caregiving Needs; availability and capacity: pt is hoping to return to his baseline, which was fully independent with ADLs, but is aware that he will require increased assistance as disease progresses.    Family Structure, Dynamics/Relationships: pt has good relationship with family.     Patterns/Styles of Communication and Decision-making in the Family: pt is  decisional.    Patient/Family Coping: appropriate     Activities of Daily Living: independent prior to hospitalization   Support Systems-Family & Community (Home Health, HME etc): TBD      Transportation:  yes    Work/Education History: retired  Self-Care Activities/Hobbies: watching football      History: no    Financial Resources:Medicare      Advanced Care Planning & Legal Concerns:   Advanced Directives/Living Will: no  [place]ST: no   Planning:  no    Medical Power of : yes     Oral/Written Declaration: yes  Witnesses:   Surrogate Decision Maker:  son. Copy of paperwork not on file in EMR, however, pt states he has.    Emergency Contacts:    Spirituality, Culture & Coping Mechanisms:  F- Makayla and Belief: Buddhism     I - Importance:     C - Community/Culture Values:     A - Address in Care:       Goals/Hopes/Expectations: to eventually become a candidate for liver tx workup  Fears/Anxiety/Concerns: further decline         Preferences about EOL Environment: (own bed, family nearby, pets, music, etc): not discussed today.    Advance Care Planning     Date: 2024    [facility]  I engaged the patient in a voluntary conversation about advance care planning and we specifically addressed what the goals of care would be moving forward, in light of the patient's change in clinical status, specifically decompensated cirrhosis.  We did not specifically address the patient's likely prognosis, which is guarded.  We explored the patient's values and preferences for future care.  The patient endorses that what is most important right now is to focus on remaining as independent as possible, symptom/pain control, extending life as long as possible, even it it means sacrificing quality, and curative/life-prolongation (regardless of treatment burdens)    Accordingly, we have decided that the best plan to meet the patient's goals includes continuing with treatment    A total of 16 min was spent on advance  "care planning, goals of care discussion, emotional support, formulating and communicating prognosis and exploring burden/benefit of various approaches of treatment. This discussion occurred on a fully voluntary basis with the verbal consent of the patient and/or family.           Discharge Planning Needs/Plan of Care:     MANDEEP, along with NEIL HUGO, met with pt at bedside. Pt AAOx3, in good spirits. Pt recognizes that he is very sick and therefore not eligible for liver workup at this time. Pt states that he spoke with Dr. Cota 6 months ago, and wasn't ready for tx, but now that he has been sober for 6 months, he feels ready. Pt states that he quit drinking "cold turkey" because he knew that's what he "had to do." Pt is hopeful that he will get well enough to be considered for a workup, as he feels ready now, but knows it "may be too late." Pt is hoping to work with PT/OT in the coming days. No needs identified at this time. WCTF.     Ml Woo, [facility]-  Department of Palliative Medicine                 "

## 2024-12-02 NOTE — SUBJECTIVE & OBJECTIVE
Interval History:     NAEON, AFVSS; urine output 90 mL, status post sled/cuff, serum creatinine 2, CO2 21, potassium 3.7, blood pressure 99/55    Review of patient's allergies indicates:  No Known Allergies  Current Facility-Administered Medications   Medication Frequency    0.9%  NaCl infusion (CRRT USE ONLY) Continuous    albuterol-ipratropium 2.5 mg-0.5 mg/3 mL nebulizer solution 3 mL Q6H PRN    aluminum-magnesium hydroxide-simethicone 200-200-20 mg/5 mL suspension 30 mL QID PRN    dextrose 10% bolus 125 mL 125 mL PRN    dextrose 10% bolus 250 mL 250 mL PRN    glucagon (human recombinant) injection 1 mg PRN    glucose chewable tablet 16 g PRN    glucose chewable tablet 24 g PRN    lactulose 20 gram/30 mL solution Soln 30 g TID    magnesium sulfate 2g in water 50mL IVPB (premix) PRN    melatonin tablet 6 mg Nightly PRN    meropenem injection 1 g Q12H    midodrine tablet 15 mg TID    naloxone 0.4 mg/mL injection 0.02 mg PRN    NORepinephrine 4 mg in sodium chloride 0.9% 250 mL infusion (premix) Continuous    ondansetron injection 4 mg Q8H PRN    pantoprazole EC tablet 40 mg Daily    simethicone chewable tablet 80 mg QID PRN    sodium chloride 0.9% flush 10 mL Q12H PRN    sodium chloride 0.9% flush 10 mL PRN    sodium phosphate 20.01 mmol in D5W 250 mL IVPB PRN    sodium phosphate 30 mmol in D5W 250 mL IVPB PRN    sodium phosphate 39.99 mmol in D5W 250 mL IVPB PRN    tamsulosin 24 hr capsule 0.4 mg QHS       Objective:     Vital Signs (Most Recent):  Temp: 98.1 °F (36.7 °C) (12/02/24 1501)  Pulse: 106 (12/02/24 1546)  Resp: 17 (12/02/24 1546)  BP: (!) 99/55 (12/01/24 0645)  SpO2: 96 % (12/02/24 1546) Vital Signs (24h Range):  Temp:  [97.7 °F (36.5 °C)-98.1 °F (36.7 °C)] 98.1 °F (36.7 °C)  Pulse:  [] 106  Resp:  [11-28] 17  SpO2:  [92 %-98 %] 96 %  Arterial Line BP: ()/(33-64) 111/41     Weight: (!) 149 kg (328 lb 7.8 oz) (12/02/24 0400)  Body mass index is 43.34 kg/m².  Body surface area is 2.77 meters  squared.    I/O last 3 completed shifts:  In: 3904.2 [P.O.:560; I.V.:3247.9; IV Piggyback:96.3]  Out: 4214 [Urine:490; Other:3724]     Physical Exam  Constitutional:       General: He is not in acute distress.     Appearance: He is ill-appearing. He is not toxic-appearing.   HENT:      Head: Normocephalic and atraumatic.   Eyes:      Extraocular Movements: Extraocular movements intact.      Pupils: Pupils are equal, round, and reactive to light.   Cardiovascular:      Rate and Rhythm: Normal rate.   Pulmonary:      Effort: No respiratory distress.      Breath sounds: No wheezing or rales.   Abdominal:      General: There is distension.      Tenderness: There is no abdominal tenderness. There is no guarding.   Musculoskeletal:      Right lower leg: Edema present.      Left lower leg: Edema present.   Skin:     Coloration: Skin is pale.   Neurological:      Mental Status: He is oriented to person, place, and time.   Psychiatric:         Behavior: Behavior normal.          Significant Labs:  CBC:   Recent Labs   Lab 12/02/24  0336   WBC 9.26   RBC 3.04*   HGB 7.6*   HCT 23.4*   PLT 45*   MCV 77*   MCH 25.0*   MCHC 32.5     CMP:   Recent Labs   Lab 12/02/24  0858 12/02/24  1446   GLU 97 123*  123*  123*   CALCIUM 7.8* 7.9*  7.9*  7.9*   ALBUMIN 2.4*  2.5* 2.5*  2.5*  2.5*   PROT 5.2*  --    * 135*  135*  135*   K 3.8 3.7  3.7  3.7   CO2 23 19*  19*  19*    107  107  107   BUN 13 15  15  15   CREATININE 2.2* 2.4*  2.4*  2.4*   ALKPHOS 41  --    ALT 8*  --    AST 14  --    BILITOT 3.4*  --      Coagulation:   Recent Labs   Lab 11/27/24  0209 11/29/24  1034   INR 2.0*  --    APTT  --  46.7*     LFTs:   Recent Labs   Lab 12/02/24  0858 12/02/24  1446   ALT 8*  --    AST 14  --    ALKPHOS 41  --    BILITOT 3.4*  --    PROT 5.2*  --    ALBUMIN 2.4*  2.5* 2.5*  2.5*  2.5*

## 2024-12-02 NOTE — PT/OT/SLP PROGRESS
Physical Therapy Co-Treatment    Patient Name:  Juan Carlos Yoo Sr.   MRN:  [id number]  Admitting Diagnosis:  Decompensated cirrhosis   Recent Surgery: * No surgery found *    Admit Date: 11/20/2024  Length of Stay: 12 days    Recommendations:     Discharge Recommendations:    Moderate Intensity Therapy   Discharge Equipment Recommendations: hospital bed, lift device, wheelchair, bedside commode   Barriers to discharge: Decreased caregiver support and increased need for skilled assistance    Plan:     During this hospitalization, patient to be seen 4 x/week to address the identified rehab impairments via gait training, therapeutic activities, therapeutic exercises, neuromuscular re-education and progress towards the established goals.  Plan of Care Expires:  12/27/24  Plan of Care Reviewed with: patient    Assessment:     Juan Carlos Yoo Sr. is a 69 y.o. male admitted with a medical diagnosis of Decompensated cirrhosis. Pt found supine agreeable to therapy session. Prior to session, pt on 0.04 Levo with MAPs ranging form 57-65 with MAP goal 60-65. RN titrated Levo up to 0.06, MAPs increased to mid to high 60s prior to session and HR in low 120s bpm. Upon sitting EOB, pt with HR increase to 150s bpm with good reading on monitor, MAPs in low 60s. Pt sat for ~1 minute with HR not regulating so returned to supine position. HR returned to 120s bpm after a few minutes, MAP within goal in supine position. RN in room and aware. Pt required increased assistance with bed mobility 2/2 generalized weakness and debility. Patient would benefit from skilled therapy services to maximize safety and independence, increase activity tolerance, decrease fall risk, decrease caregiver burden, improve QOL, improve patient's functional mobility, and decrease risk of contractures and pressure sores. Patient currently demonstrates a need for moderate intensity therapy on a daily basis post acute secondary to a decline in functional status due to  "illness     Problem List: weakness, impaired endurance, impaired self care skills, impaired functional mobility, gait instability, impaired balance, impaired cardiopulmonary response to activity, decreased safety awareness, decreased lower extremity function, decreased upper extremity function.  Rehab Prognosis: Good; patient would benefit from acute skilled PT services to address these deficits and reach maximum level of function.      Goals:   Multidisciplinary Problems       Physical Therapy Goals          Problem: Physical Therapy    Goal Priority Disciplines Outcome Interventions   Physical Therapy Goal     PT, PT/OT Progressing    Description: Goals to be met by: 24     Patient will increase functional independence with mobility by performin. Supine to sit with Minimal Assistance  2. Sit to stand transfer with Stand By Assistance  3. Bed to chair transfer with Stand By Assistance using LRAD  4. Gait  x 150 feet with Stand By Assistance using No LRAD.                          Subjective     RN notified prior to session. No one present upon PT entrance into room. Patient agreeable to PT treatment session.    Chief Complaint: "I'll do what I can"  Patient/Family Comments/goals: increase strength and mobility   Pain/Comfort:  Pain Rating 1: 0/10  Pain Rating Post-Intervention 1: 0/10      Objective:     Additional staff present: OT; OT for cotx due to pt's multiple medical comorbidities and functional deficits req'ing two skilled therapists to appropriately maximize functional potential by progressing pt's musculoskeletal strength and endurance, facilitating neuromuscular postural balance and control ,and accommodating for patient's impaired cardiopulmonary tolerance to activities.     Patient found supine with: blood pressure cuff, telemetry, pulse ox (continuous), arterial line, Trialysis, peripheral IV, pressure relief boots, PureWick   Cognition:   Alert and Cooperative  Patient is oriented to " "Person, Place, Time, Situation  General Precautions: Standard, Cardiac fall   Orthopedic Precautions:N/A   Braces: N/A   Body mass index is 43.34 kg/m².  Oxygen Device: Room Air  Vitals: BP (!) 99/55   Pulse (!) 114   Temp 98 °F (36.7 °C) (Oral)   Resp 14   Ht 6' 1" (1.854 m)   Wt (!) 149 kg (328 lb 7.8 oz)   SpO2 96%   BMI 43.34 kg/m²     Outcome Measures:  AM-PAC 6 CLICK MOBILITY  Turning over in bed (including adjusting bedclothes, sheets and blankets)?: 2  Sitting down on and standing up from a chair with arms (e.g., wheelchair, bedside commode, etc.): 1  Moving from lying on back to sitting on the side of the bed?: 2  Moving to and from a bed to a chair (including a wheelchair)?: 1  Need to walk in hospital room?: 1  Climbing 3-5 steps with a railing?: 1  Basic Mobility Total Score: 8     Functional Mobility:    Bed Mobility:   Supine > Sit with total assistance and 2 persons  Sit > Supine with total assistance and 2 persons    Balance:  Sitting Balance  Static: maximal assistance      Education:  Time provided for education, counseling and discussion of health disposition in regards to patient's current status  All questions answered within PT scope of practice and to patient's satisfaction  PT role in POC to address current functional deficits  Call nursing/pct to transfer to chair/use bathroom. Pt stated understanding.  Pt educated on and demonstrated understanding of LE TE to perform to increase LE strength    Therapeutic exercise performed in the form of bed mobility, sitting balance, and transfers to increase patient's activity tolerance and postural control.     Patient left supine with all lines intact, call button in reach, and RN present.    Time Tracking:     PT Received On: 12/02/24  PT Start Time: 1401     PT Stop Time: 1419  PT Total Time (min): 18 min     Billable Minutes:   Therapeutic Exercise 8 minutes       PT/PTA: PT       12/2/2024    "

## 2024-12-02 NOTE — ASSESSMENT & PLAN NOTE
Nephrology following  Strict intake and output  Renally dose all medications  Avoid nephrotoxic agents

## 2024-12-02 NOTE — PROGRESS NOTES
[facility] - Medical ICU  Nephrology  Progress Note    Patient Name: Juan Carlos Yoo Sr.  MRN: [id number]  Admission Date: 11/20/2024  Hospital Length of Stay: 12 days  Attending Provider: Evelyn Amos MD   Primary Care Physician: Nyla Rios FNP  Principal Problem:Decompensated cirrhosis    Subjective:     HPI: Juan Carlos Yoo is a 69 year old male with hx of decompensated hepatic cirrhosis due to alcohol use, HCC s/p Y90, esophageal varices, persistent afib on eliquis, HTN, CKD3a (baseline creatinine 1.2-1.4) admitted on 11/20 for sepsis likely 2/2 SSTI involving RUE and decompensated hepatic cirrhosis with signs of volume overload. Recent admission 10/4 at [facility] for decompensated hepatic cirrhosis where he was discharged with oral diuretic regimen including furosemide 60 mg BID and metolazone 2.5 mg daily, low salt diet with fluid restriction. It is unclear if patient is adherent to these medications or lifestyle modifications. W/u notable for anemia with hb 6.7 s/p 1 unit pRBC 11/20 and JAE on CKD w elevated BUN 49/creatinine 5.9, hyperkalemia (K 6.1>5.1 after shifting), bicarb 18, elevated lactate up to 3.5. Nephrology consulted for JAE with c/o HRS    Interval History:     ARLET YING; urine output 90 mL, status post sled/cuff, serum creatinine 2, CO2 21, potassium 3.7, blood pressure 99/55    Review of patient's allergies indicates:  No Known Allergies  Current Facility-Administered Medications   Medication Frequency    0.9%  NaCl infusion (CRRT USE ONLY) Continuous    albuterol-ipratropium 2.5 mg-0.5 mg/3 mL nebulizer solution 3 mL Q6H PRN    aluminum-magnesium hydroxide-simethicone 200-200-20 mg/5 mL suspension 30 mL QID PRN    dextrose 10% bolus 125 mL 125 mL PRN    dextrose 10% bolus 250 mL 250 mL PRN    glucagon (human recombinant) injection 1 mg PRN    glucose chewable tablet 16 g PRN    glucose chewable tablet 24 g PRN    lactulose 20 gram/30 mL solution Soln 30 g TID    magnesium sulfate 2g in water  50mL IVPB (premix) PRN    melatonin tablet 6 mg Nightly PRN    meropenem injection 1 g Q12H    midodrine tablet 15 mg TID    naloxone 0.4 mg/mL injection 0.02 mg PRN    NORepinephrine 4 mg in sodium chloride 0.9% 250 mL infusion (premix) Continuous    ondansetron injection 4 mg Q8H PRN    pantoprazole EC tablet 40 mg Daily    simethicone chewable tablet 80 mg QID PRN    sodium chloride 0.9% flush 10 mL Q12H PRN    sodium chloride 0.9% flush 10 mL PRN    sodium phosphate 20.01 mmol in D5W 250 mL IVPB PRN    sodium phosphate 30 mmol in D5W 250 mL IVPB PRN    sodium phosphate 39.99 mmol in D5W 250 mL IVPB PRN    tamsulosin 24 hr capsule 0.4 mg QHS       Objective:     Vital Signs (Most Recent):  Temp: 98.1 °F (36.7 °C) (12/02/24 1501)  Pulse: 106 (12/02/24 1546)  Resp: 17 (12/02/24 1546)  BP: (!) 99/55 (12/01/24 0645)  SpO2: 96 % (12/02/24 1546) Vital Signs (24h Range):  Temp:  [97.7 °F (36.5 °C)-98.1 °F (36.7 °C)] 98.1 °F (36.7 °C)  Pulse:  [] 106  Resp:  [11-28] 17  SpO2:  [92 %-98 %] 96 %  Arterial Line BP: ()/(33-64) 111/41     Weight: (!) 149 kg (328 lb 7.8 oz) (12/02/24 0400)  Body mass index is 43.34 kg/m².  Body surface area is 2.77 meters squared.    I/O last 3 completed shifts:  In: 3904.2 [P.O.:560; I.V.:3247.9; IV Piggyback:96.3]  Out: 4214 [Urine:490; Other:3724]     Physical Exam  Constitutional:       General: He is not in acute distress.     Appearance: He is ill-appearing. He is not toxic-appearing.   HENT:      Head: Normocephalic and atraumatic.   Eyes:      Extraocular Movements: Extraocular movements intact.      Pupils: Pupils are equal, round, and reactive to light.   Cardiovascular:      Rate and Rhythm: Normal rate.   Pulmonary:      Effort: No respiratory distress.      Breath sounds: No wheezing or rales.   Abdominal:      General: There is distension.      Tenderness: There is no abdominal tenderness. There is no guarding.   Musculoskeletal:      Right lower leg: Edema present.       Left lower leg: Edema present.   Skin:     Coloration: Skin is pale.   Neurological:      Mental Status: He is oriented to person, place, and time.   Psychiatric:         Behavior: Behavior normal.          Significant Labs:  CBC:   Recent Labs   Lab 12/02/24  0336   WBC 9.26   RBC 3.04*   HGB 7.6*   HCT 23.4*   PLT 45*   MCV 77*   MCH 25.0*   MCHC 32.5     CMP:   Recent Labs   Lab 12/02/24  0858 12/02/24  1446   GLU 97 123*  123*  123*   CALCIUM 7.8* 7.9*  7.9*  7.9*   ALBUMIN 2.4*  2.5* 2.5*  2.5*  2.5*   PROT 5.2*  --    * 135*  135*  135*   K 3.8 3.7  3.7  3.7   CO2 23 19*  19*  19*    107  107  107   BUN 13 15  15  15   CREATININE 2.2* 2.4*  2.4*  2.4*   ALKPHOS 41  --    ALT 8*  --    AST 14  --    BILITOT 3.4*  --      Coagulation:   Recent Labs   Lab 11/27/24  0209 11/29/24  1034   INR 2.0*  --    APTT  --  46.7*     LFTs:   Recent Labs   Lab 12/02/24  0858 12/02/24  1446   ALT 8*  --    AST 14  --    ALKPHOS 41  --    BILITOT 3.4*  --    PROT 5.2*  --    ALBUMIN 2.4*  2.5* 2.5*  2.5*  2.5*       Assessment/Plan:     Renal/  JAE (acute kidney injury)  JAE is likely due to pre-renal azotemia due to intravascular volume depletion secondary to decompensated hepatic cirrhosis with volume overload and acute tubular necrosis caused by hemodynamic instability, renal hypoperfusion, severe sepsis with GNR bacteremia. Initial presentation concerning for hepatorenal syndrome, transferred to MICU for vasopressors without success in reaching MAP goal 85-90 for treatment of HRS. Currently, patient with oligouric JAE more contributed by ATN as above.     Recommendations:    -Plan for 6 hours SCUF followed by 6 hours SLED target UF  350-400 cc/hr >>tonight for removal of uremic toxins and volume control  -Recommend goals of care discussion regarding current prognosis and liver transplant candidacy   -Avoid nephrotoxins and renally dose meds for GFR listed above  -Monitor urine  output, serial BMP, and adjust therapy as needed  -Hemodialysis consent obtained & placed in patient chart        Thank you for your consult. I will follow-up with patient. Please contact us if you have any additional questions.    Irma Hernandez MD  Nephrology  [facility] - Medical ICU

## 2024-12-02 NOTE — ASSESSMENT & PLAN NOTE
JAE is likely due to pre-renal azotemia due to intravascular volume depletion secondary to decompensated hepatic cirrhosis with volume overload and acute tubular necrosis caused by hemodynamic instability, renal hypoperfusion, severe sepsis with GNR bacteremia. Initial presentation concerning for hepatorenal syndrome, transferred to MICU for vasopressors without success in reaching MAP goal 85-90 for treatment of HRS. Currently, patient with oligouric JAE more contributed by ATN as above.     Recommendations:    -Plan for 6 hours SCUF followed by 6 hours SLED tonight for removal of uremic toxins and volume control  -Recommend goals of care discussion regarding current prognosis and liver transplant candidacy   -Avoid nephrotoxins and renally dose meds for GFR listed above  -Monitor urine output, serial BMP, and adjust therapy as needed  -Hemodialysis consent obtained & placed in patient chart

## 2024-12-02 NOTE — PROGRESS NOTES
[facility] - Medical [facility]  Critical Care Medicine  Progress Note    Patient Name: JuanC arlos Yoo Sr.  MRN: [id number]  Admission Date: 11/20/2024  Hospital Length of Stay: 12 days  Code Status: Full Code  Attending Provider: Evelyn Amos MD  Primary Care Provider: Nyla Rios FNP   Principal Problem: Decompensated cirrhosis    Subjective:     HPI:  Juan Carlos Yoo is a 69 male with PMH of alcoholic cirrhosis, esophageal varices, Afib on eliquis, and HTN who presents for c/o worsening diffuse swelling as well as R arm pain/erythema. He was recently hospitalized 1 month ago at [facility] for volume overload in the setting of decompensated cirrhosis. He was treated with IV diuresis and discharged with adjustment to his diuretics, currently on lasix 60mg BID and metolazone 2.5mg every other week as per family. Patient reports diffuse swelling of his upper and lower extremities in addition to distended abdomen and worsening dyspnea, which he believes is due to recent medication adjustment. Family states he is non-compliant with low sodium diet and fluid restriction. Family states he has been compliant with diuretics, but rom't taken eliquis for 3 days due to issue getting refill. He also reports scratching right arm on door frame 3-4 days ago which has become red and painful after wrapping in in bandage. He claims to be compliant with medications, but is a poor historian. He follows with hepatology, not currently active on transplant list. He states he hasn't consumed alcohol for at least 9 months. Has c/o chills, but denies fever, cough, nausea, vomiting, chest pain, dysuria, hematuria, blood in stool. Workup in ED remarkable for VS: T 97.6 HR 94 RR 22 BP 95/56 O2 sat 97% on RA. Hb 6.7, MCV 75, Plt 81, PT/INR 22.6/2.2, Na 128, K 6.1, BUN/Cr 49/5.7, Alb 2.8, AST/ALT 35/9, Ammonia 104, , Trop neg, LA 2.9, CXR: Cardiac size is enlarged similar to prior.  No large volume of pleural fluid noted although there is mild  blunting of the right costophrenic angle which may relate to a small amount of pleural fluid.  Suspected right basilar opacity, to be correlated clinically for infection. RUE venous doppler: No thrombus in central veins of the right upper extremity. Patient received vanc/zosyn and lasix 80mg IVP in ED and will be admitted to hospital medicine service for further management.     Pt was admitted to hospital medicine. Blood cultures positive for Gram-positive cocci and Gram-negative rods. ID has been consulted. S/p 2 units PRBC for Hemoglobin dropped 6.8 on 11/21. Pt was on Eliquis for atrial fibrillation prior to admission which was held.  Hepatology following patient's clinical course, but are not evaluating him for transplant at this time. Diuretics were held because of concern for HRS.  Patient was started on albumin and midodrine per Nephrology recommendations for HRS.  Renal function continued to worsen and recommendation per Nephrology to transfer to ICU for maintaining goal map over 85 on Levophed.  ICU was notified and patient to be assessed to be transferred to ICU.       Hospital/ICU Course:  69 y.o. male with history of EtOH use disorder, EtOH cirrhosis, HCC s/p y90, morbid obesity BMI 44, HTN, and Afib who presents with severe anasarca and JAE.      Appreciate hepatology and nephrology recommendations.  Diuretics were held because of concern for HRS.  Patient was started on albumin and midodrine per Nephrology recommendations for HRS.  Renal function continued to worsen and recommendation per Nephrology to transfer to ICU for maintaining goal map over 85 on Levophed.  ICU was notified and patient to be assessed to be transferred to ICU.     Patient also has Gram-positive cocci and Gram-negative rods in his blood now.  Possible sources could be got translocation and barrier related infection through skin as he has been significantly edematous and has been oozing.  Has been on vancomycin and Rocephin.  Id  was consulted this morning.  Repeat blood cultures were obtained.     Continues to have anemia.  Hemoglobin dropped on 11/20 and was given 1 unit of packed red blood cells.  Did not respond appropriately.  Hemoglobin dropped again to 6.8 on 11/21 and was given 1 unit of packed red blood cells.  Hemoglobin again on 11/22 is 7.2.  No reported melena or hematochezia.  Patient was on Eliquis for atrial fibrillation prior to admission which was held.  Given history of esophageal varices and portal hypertensive gastropathy whereas also could be component of slow bleeding, under production due to CKD and hemolysis while also with decompensated cirrhosis.  Started on Protonix IV b.i.d. and octreotide.     Also clarified with hepatology.  Given patient's current infection we will await ID recommendations however we will be challenging to consider transplant evaluation at this time.  Trend clinical course     Goal clarification with the patient and family.  Patient at this time wants to be full code and continue with Levophed in ICU.  They would consider discussion with palliative care in a day or so if things do not get better.     In MICU patient started on Levophed, however titration remained difficult given tachycardic response from the patient.     11/24 Trialysis and arterial lines placed overnight and currently on levo and norepi. SLED today.    11/25 Boo dc. Increased midodrine. Continue steroids until d/c pressors. Discussed with Nephrology about our concern for sustained use of pressors to achieve their MAP goals of 85. Will follow up tomorrow.     11/26 Platelets 48. Repeat 38 confirming thrombocytopenia. Concern for HIT. D/c heparin. Increased lactulose dosing. Bladder scan revealed urine in bladder. Boo placed. Off vaso. Continuing levo. Maintain MAP goals 80. PT/OT consulted.     11/27 MAP goal 75-80. Heparin antibody result pending.     11/28 Pt with abdominal pain, decreased bowel movements, emesis. Lactic  acid 2.9 and repeat 2.7. Less concerned for mesenteric ischemia and more of a concern was for SBO. NG tube placed with subsequent suction of 500mL fluid. Abdomen less distended after placement and pt subsequently had bowel movement. MAP Goal of 60 with plan to come off pressors entirely and switch to dialysis after permacath, as he is not  liver transplant eligible at this time.     11/29 Pt with ileus. Clear liquids for now. Platelets 24. HIT versus zosyn induced? Peripheral smear sent. Zosyn changed to kaylah. Consent and transfuse 1U. GOC conversation today. Pt opting for long term dialysis.     11/30 naeon. Started back on heparin. One time dose of 120mg lasix today. Hold off SLED/SCUF today and give IV lasix 120 mg once; plan for SLED/SCUF tomorrow     12/1 Patient is becoming more dependent on dialysis. Not a current liver transplant candidate. Still complaining of abdominal pain after discontinuing the NGT. AXR with evidence of dilated bowel loops. Brown bomb administered.    Interval History/Significant Events: NAEON, Patient reports feeling better, reported having a bowel movement after brown bomb enema.     Denies HA, fevers, chills, chest pain, palpitations, SOB, N/V.     Review of Systems  Objective:     Vital Signs (Most Recent):  Temp: 97.7 °F (36.5 °C) (12/02/24 0701)  Pulse: 94 (12/02/24 0830)  Resp: 15 (12/02/24 0816)  BP: (!) 99/55 (12/01/24 0645)  SpO2: 95 % (12/02/24 0816) Vital Signs (24h Range):  Temp:  [97.7 °F (36.5 °C)-98.1 °F (36.7 °C)] 97.7 °F (36.5 °C)  Pulse:  [] 94  Resp:  [10-28] 15  SpO2:  [92 %-99 %] 95 %  Arterial Line BP: (101-141)/(31-64) 109/39   Weight: (!) 149 kg (328 lb 7.8 oz)  Body mass index is 43.34 kg/m².      Intake/Output Summary (Last 24 hours) at 12/2/2024 0954  Last data filed at 12/2/2024 0826  Gross per 24 hour   Intake 3463.53 ml   Output 4039 ml   Net -575.47 ml          Physical Exam  Vitals and nursing note reviewed.   Constitutional:       General: He is  awake. He is not in acute distress.     Appearance: He is obese. He is ill-appearing. He is not toxic-appearing.   HENT:      Head: Normocephalic and atraumatic.   Eyes:      General: No scleral icterus.     Extraocular Movements: Extraocular movements intact.      Conjunctiva/sclera: Conjunctivae normal.   Cardiovascular:      Rate and Rhythm: Tachycardia present. Rhythm irregular.   Pulmonary:      Effort: Pulmonary effort is normal. No respiratory distress.   Abdominal:      General: There is distension.      Palpations: Abdomen is soft.      Tenderness: There is no abdominal tenderness. There is no guarding or rebound.   Musculoskeletal:         General: No swelling or tenderness.      Right lower leg: Edema present.      Left lower leg: Edema present.      Comments: 2+ pitting edema in bilateral lower extremities   Skin:     General: Skin is warm.      Coloration: Skin is not jaundiced.      Findings: Bruising present.   Neurological:      Mental Status: He is alert and oriented to person, place, and time.      Motor: No weakness.            Vents:     Lines/Drains/Airways       Central Venous Catheter Line  Duration             Trialysis (Dialysis) Catheter 11/24/24 0441 right internal jugular 8 days              Drain  Duration                  Urethral Catheter 11/26/24 1738 5 days              Arterial Line  Duration             Arterial Line 11/24/24 0317 Right Radial 8 days              Peripheral Intravenous Line  Duration                  Peripheral IV - Single Lumen 11/27/24 1101 20 G Anterior;Left Forearm 4 days                  Significant Labs:    CBC/Anemia Profile:  Recent Labs   Lab 12/01/24  0227 12/02/24  0336   WBC 10.22 9.26   HGB 7.7* 7.6*   HCT 23.6* 23.4*   PLT 42* 45*   MCV 77* 77*   RDW 23.4* 23.5*        Chemistries:  Recent Labs   Lab 12/01/24  0227 12/01/24  0838 12/01/24  1542 12/01/24  2217 12/02/24  0336   *  --  132*  132*  132*  132*  132*  132* 131* 131*   K 3.9  --   "3.9  3.9  3.9  3.9  3.9  3.9 3.8 3.7     --  103  103  103  103  103  103 104 104   CO2 22*  --  22*  22*  22*  22*  22*  22* 23 21*   BUN 19  --  20  20  20  20  20  20 10 12   CREATININE 3.1*  --  3.1*  3.1*  3.1*  3.1*  3.1*  3.1* 1.8* 2.0*   CALCIUM 8.4*  --  8.2*  8.2*  8.2*  8.2*  8.2*  8.2* 7.8* 7.9*   ALBUMIN 2.6* 2.7* 2.4*  2.4*  2.4*  2.4*  2.4*  2.4* 2.6* 2.5*   PROT 5.6* 5.5*  --   --  5.3*   BILITOT 3.0* 3.1*  --   --  3.8*   ALKPHOS 46 42  --   --  44   ALT 11 11  --   --  8*   AST 15 14  --   --  14   MG 2.1  --  1.9  1.9  1.9  1.9  1.9  1.9 1.8 2.1   PHOS 3.8  --  3.5  3.5  3.5  3.5  3.5  3.5 2.1* 2.2*       All pertinent labs within the past 24 hours have been reviewed.    Significant Imaging:  I have reviewed all pertinent imaging results/findings within the past 24 hours.    ABG  No results for input(s): "PH", "PO2", "PCO2", "HCO3", "BE" in the last 168 hours.  Assessment/Plan:     Neuro  Encephalopathy, metabolic  AAOx4  Will assess for signs of encephalopathy     Cardiac/Vascular  Atrial fibrillation  Holding home Eliquis in the setting of recent low blood counts    Renal/  Stage 3a chronic kidney disease  Nephrology following  Strict intake and output  Renally dose all medications  Avoid nephrotoxic agents    JAE (acute kidney injury)  Baseline creatinine is 1.5. May require dialysis long term.     -- Stress dosing with Fludrocortisone and Hydrocortisone as HRS patients tend to have AI-- 11/27 d/c steroids  --appreciate nephrology recommendations continuing dialysis.   - 11/29 MAP goal > 60-65    ID  Gram-negative bacteremia  Blood cultures from admission with B. Diminuta, micrococcus luteus, lysinobacillus species. Seen by ID previously who recommended stopping antibiotics due to concern these were contaminants. Repeat blood cultures have been no growth. Has since been transferred to ICU with increased pressor requirement. Blood cultures " "repeated on 11/24 are no growth x 24 hours.   11/29 Switched from zosyn to meropenem due to concern of thrombocytopenia.   Continue meropenem.  ID consulted for recommendations regarding duration.     Severe sepsis  This patient does have evidence of infective focus  My overall impression is sepsis.  Source: Abdominal  Antibiotics given-   Antibiotics (72h ago, onward)      Start     Stop Route Frequency Ordered    12/02/24 1800  meropenem injection 1 g         -- IV Every 12 hours (non-standard times) 12/02/24 1517          Latest lactate reviewed-  No results for input(s): "LACTATE", "POCLAC" in the last 72 hours.    Organ dysfunction indicated by Acute kidney injury    Fluid challenge Contraindicated- Fluid bolus is contraindicated in this patient due to End Stage Liver Disease     Post- resuscitation assessment No - Post resuscitation assessment not needed     Source control achieved by: antibiotics  ID consulted for recommendations regarding abx duration.    Hematology  Thrombocytopenia  Daily CBC  Transfuse for PLT<10k, or PLT<50K with active bleeding  Monitor for signs and symptoms of bleeding    Coagulopathy  End Stage Liver Disease precipitated coagulopathy  Heparin d/c d/t thrombocytopenia    Endocrine  Hyponatremia  Likely dilutional secondary to end stage liver disease and HRS.   CTM, correct if appropriate    GI  * Decompensated cirrhosis  MELD 3.0: 32 at 11/29/2024  9:57 PM  MELD-Na: 32 at 11/29/2024  9:57 PM  Calculated from:  Serum Creatinine: On dialysis. Using the maximum value.  Serum Sodium: 132 mmol/L at 11/29/2024  9:57 PM  Total Bilirubin: 2.9 mg/dL at 11/29/2024 10:34 AM  Serum Albumin: 2.8 g/dL at 11/29/2024  9:57 PM  INR(ratio): 2 at 11/27/2024  2:09 AM  Age at listing (hypothetical): 69 years  Sex: Male at 11/29/2024  9:57 PM    Hepatology following, pt not transplant eligible at this time.       Abdominal pain  Patient developed abdominal pain 2/2 constipation last week and NGT was " placed. 11/30 NGT was removed. The day after the NGT was removed he developed constipation and dilated bowel loops on AXR.  Some BM after brown bomb enema.   Continue to monitor       Critical Care Daily Checklist:    A: Awake: RASS Goal/Actual Goal: RASS Goal: 0-->alert and calm  Actual:     B: Spontaneous Breathing Trial Performed?     C: SAT & SBT Coordinated?  N/A                    D: Delirium: CAM-ICU Overall CAM-ICU: Negative   E: Early Mobility Performed? Yes   F: Feeding Goal: Goals: to meet % of EEN/EPN by next RD f/u  Status: Nutrition Goal Status: new   Current Diet Order   Procedures    Diet Clear Liquid Standard Tray     Order Specific Question:   Tray type:     Answer:   Standard Tray      AS: Analgesia/Sedation None   T: Thromboembolic Prophylaxis N/A   H: HOB > 300 Yes   U: Stress Ulcer Prophylaxis (if needed) Yes   G: Glucose Control None   B: Bowel Function Stool Occurrence: 1   I: Indwelling Catheter (Lines & Boo) Necessity Trialysis, PIV   D: De-escalation of Antimicrobials/Pharmacotherapies Meropenem    Plan for the day/ETD Bowel movement    Code Status:  Family/Goals of Care: Full Code         Critical secondary to Patient has a condition that poses threat to life and bodily function: Acute Renal Failure      Critical care was time spent personally by me on the following activities: development of treatment plan with patient or surrogate and bedside caregivers, discussions with consultants, evaluation of patient's response to treatment, examination of patient, ordering and performing treatments and interventions, ordering and review of laboratory studies, ordering and review of radiographic studies, pulse oximetry, re-evaluation of patient's condition. This critical care time did not overlap with that of any other provider or involve time for any procedures.     Rhona Lewis, DO  Critical Care Medicine  [facility] - Medical ICU

## 2024-12-02 NOTE — ASSESSMENT & PLAN NOTE
Baseline creatinine is 1.5. May require dialysis long term.     -- Stress dosing with Fludrocortisone and Hydrocortisone as HRS patients tend to have AI-- 11/27 d/c steroids  --appreciate nephrology recommendations continuing dialysis.   - 11/29 MAP goal > 60-65

## 2024-12-02 NOTE — CONSULTS
[facility] - Medical ICU  Palliative Medicine  Consult Note    Patient Name: Juan Carlos Yoo Sr.  MRN: [id number]  Admission Date: 11/20/2024  Hospital Length of Stay: 12 days  Code Status: Full Code   Attending Provider: Evelyn Amos MD  Consulting Provider: ROE Alcaraz  Primary Care Physician: Nyla Rios FNP  Principal Problem:Decompensated cirrhosis    Patient information was obtained from patient and primary team.      Inpatient consult to Palliative Care  Consult performed by: Mimi Guerrero CNS  Consult ordered by: Fatoumata Ervin MD        Assessment/Plan:     Palliative Care  Palliative care encounter  Impression: Pt is a 68 y/o male with PMH of alcoholic cirrhosis, esophageal varices, Afib on eliquis, and HTN who presented for c/o worsening diffuse swelling as well as R arm pain. Pt has ARF. Pt has Decompensated cirrhosis Pt is a full code.     Reason for consult: GOC/planning for difficult conversations.     Goals of care/ACP:     Met with pt who is aware of his medical issues. Pt is aware he is not a candidate for liver transplant at this time. Per pt he stopped drinking 6 months ago. Per pt he is aware he has damaged himself from drinking so much but is determined to remain sober. Pt stated he is aware that even with stopping to drink he may continue to decline.   Per pt his goal at this time is to continue medical treatment. Per pt he wants to continue to f/u with Dr. Cota in Hepatology clinic.   Pt reports prior to hospitalizations he was independent and lives alone.   One of pt's goal is to remain independent.     MPOA: pt report his son, Juan Carlos Yoo Jr is MPOA. He reported he did paperwork.     Symptom management:     Debility:   Would have PT/OT see and evaluate pt.     JAE:  Managed per nephrology     Plan per nephrology  -- Stress dosing with Fludrocortisone and Hydrocortisone as HRS patients tend to have AI-- 11/27 d/c steroids  --appreciate nephrology recommendations  continuing dialysis.   - 11/29 MAP goal > 60-65    Pt reports no pain.     Plan:   Will continue to follow.             Thank you for your consult. I will follow-up with patient. Please contact us if you have any additional questions.    Subjective:     HPI:   Juan Carlos Yoo is a 69 male with PMH of alcoholic cirrhosis, esophageal varices, Afib on eliquis, and HTN who presents for c/o worsening diffuse swelling as well as R arm pain/erythema. Per chart review, pt was recently hospitalized 1 month ago at [facility] for volume overload in the setting of decompensated cirrhosis. He was treated with IV diuresis and discharged with adjustment to his diuretics, currently on lasix 60mg BID and metolazone 2.5mg every other week as per family. Patient reports diffuse swelling of his upper and lower extremities in addition to distended abdomen and worsening dyspnea, which he believes is due to recent medication adjustment. Family states he is non-compliant with low sodium diet and fluid restriction. Family states he has been compliant with diuretics, but rom't taken eliquis for 3 days due to issue getting refill. He also reports scratching right arm on door frame 3-4 days ago which has become red and painful after wrapping in in bandage. He claims to be compliant with medications, but is a poor historian. He follows with hepatology, not currently active on transplant list. He states he hasn't consumed alcohol for at least 9 months. Has c/o chills, but denies fever, cough, nausea, vomiting, chest pain, dysuria, hematuria, blood in stool. Workup in ED remarkable for VS: T 97.6 HR 94 RR 22 BP 95/56 O2 sat 97% on RA. Hb 6.7, MCV 75, Plt 81, PT/INR 22.6/2.2, Na 128, K 6.1, BUN/Cr 49/5.7, Alb 2.8, AST/ALT 35/9, Ammonia 104, , Trop neg, LA 2.9, CXR: Cardiac size is enlarged similar to prior.  No large volume of pleural fluid noted although there is mild blunting of the right costophrenic angle which may relate to a small amount  of pleural fluid.  Suspected right basilar opacity, to be correlated clinically for infection. RUE venous doppler: No thrombus in central veins of the right upper extremity. Patient received vanc/zosyn and lasix 80mg IVP in ED and will be admitted to hospital medicine service for further management.     Pt was admitted to hospital medicine. Blood cultures positive for Gram-positive cocci and Gram-negative rods. ID has been consulted. S/p 2 units PRBC for Hemoglobin dropped 6.8 on 11/21. Pt was on Eliquis for atrial fibrillation prior to admission which was held.  Hepatology following patient's clinical course, but are not evaluating him for transplant at this time. Diuretics were held because of concern for HRS.  Patient was started on albumin and midodrine per Nephrology recommendations for HRS.  Renal function continued to worsen and recommendation per Nephrology to transfer to ICU for maintaining goal map over 85 on Levophed.     Pt off pressor support and on dialysis.     Hospital Course:  No notes on file    Interval History: Pt has alcoholic cirrhosis.     Past Medical History:   Diagnosis Date    Atrial fibrillation     Bulging disc     Cirrhosis     Colon polyp 12/29/2017    Rpt 5 yrs    EV (esophageal varices)     Hypertension 3/30/2023    Skin cancer 03/2017    Thrombocytopenia        Past Surgical History:   Procedure Laterality Date    CATHETERIZATION OF BOTH LEFT AND RIGHT HEART N/A 2/8/2023    Procedure: CATHETERIZATION, HEART, BOTH LEFT AND RIGHT;  Surgeon: Flo Malone MD;  Location: [facility] CATH LAB;  Service: Cardiology;  Laterality: N/A;  low bleeding risk 1.0%    CHOLECYSTECTOMY      COLONOSCOPY      COLONOSCOPY N/A 12/29/2017    ta rpt 2022    CORONARY ANGIOGRAPHY N/A 2/8/2023    Procedure: ANGIOGRAM, CORONARY ARTERY;  Surgeon: Flo Malone MD;  Location: [facility] CATH LAB;  Service: Cardiology;  Laterality: N/A;    HERNIA REPAIR      SKIN BIOPSY  03/2017    TONSILLECTOMY      UPPER  GASTROINTESTINAL ENDOSCOPY  2011    EV    UPPER GASTROINTESTINAL ENDOSCOPY  2016    VASECTOMY         Review of patient's allergies indicates:  No Known Allergies    Medications:  Continuous Infusions:   sodium chloride 0.9%   Intravenous Continuous   Stopped at 12/02/24 0326    NORepinephrine bitartrate-D5W  0-3 mcg/kg/min Intravenous Continuous 22.2 mL/hr at 12/02/24 1101 0.04 mcg/kg/min at 12/02/24 1101     Scheduled Meds:   lactulose  30 g Oral TID    meropenem IV (PEDS and ADULTS)  2 g Intravenous Q12H    midodrine  15 mg Oral TID    pantoprazole  40 mg Oral Daily    tamsulosin  1 capsule Oral QHS     PRN Meds:  Current Facility-Administered Medications:     albuterol-ipratropium, 3 mL, Nebulization, Q6H PRN    aluminum-magnesium hydroxide-simethicone, 30 mL, Oral, QID PRN    dextrose 10%, 12.5 g, Intravenous, PRN    dextrose 10%, 25 g, Intravenous, PRN    glucagon (human recombinant), 1 mg, Intramuscular, PRN    glucose, 16 g, Oral, PRN    glucose, 24 g, Oral, PRN    melatonin, 6 mg, Oral, Nightly PRN    naloxone, 0.02 mg, Intravenous, PRN    ondansetron, 4 mg, Intravenous, Q8H PRN    simethicone, 1 tablet, Oral, QID PRN    sodium chloride 0.9%, 10 mL, Intravenous, Q12H PRN    sodium chloride 0.9%, 10 mL, Intravenous, PRN    Family History       Problem Relation (Age of Onset)    Cancer Maternal Uncle    Heart disease Maternal Uncle    Hyperlipidemia Brother    Ovarian cancer Sister          Tobacco Use    Smoking status: Never    Smokeless tobacco: Never   Substance and Sexual Activity    Alcohol use: Not Currently     Alcohol/week: 0.0 standard drinks of alcohol    Drug use: No    Sexual activity: Not on file       Review of Systems   Constitutional:  Positive for activity change.   Respiratory:  Negative for shortness of breath.    Cardiovascular:  Positive for leg swelling.   Gastrointestinal:  Positive for constipation.   Neurological:  Positive for weakness.     Objective:     Vital Signs (Most  Recent):  Temp: 98 °F (36.7 °C) (12/02/24 1101)  Pulse: 104 (12/02/24 1146)  Resp: 14 (12/02/24 1146)  BP: (!) 99/55 (12/01/24 0645)  SpO2: 97 % (12/02/24 1146) Vital Signs (24h Range):  Temp:  [97.7 °F (36.5 °C)-98.1 °F (36.7 °C)] 98 °F (36.7 °C)  Pulse:  [] 104  Resp:  [10-28] 14  SpO2:  [92 %-99 %] 97 %  Arterial Line BP: ()/(31-64) 114/39     Weight: (!) 149 kg (328 lb 7.8 oz)  Body mass index is 43.34 kg/m².       Physical Exam  Constitutional:       Appearance: He is overweight.   HENT:      Head: Normocephalic and atraumatic.   Pulmonary:      Effort: Pulmonary effort is normal.   Musculoskeletal:      Comments: Edema noted to extremities.    Skin:     General: Skin is warm and dry.   Neurological:      Mental Status: He is alert and oriented to person, place, and time.   Psychiatric:         Behavior: Behavior is cooperative.            Review of Symptoms      Symptom Assessment (ESAS 0-10 Scale)  Pain:  0  Dyspnea:  0  Anxiety:  0  Nausea:  0  Depression:  0  Anorexia:  0  Fatigue:  0  Insomnia:  0  Restlessness:  0  Agitation:  0       Constipation:  Constipation    Psychosocial/Cultural:   See Palliative Psychosocial Note: Yes  **Primary  to Follow**  Palliative Care  Consult: Yes        Advance Care Planning  Advance Directives:   Living Will: No    LaPOST: No    Do Not Resuscitate Status: No    Medical Power of : No    Agent's Name:  Pt reports he has fille dout MPOA and his son is MPOA.    Decision Making:  Patient answered questions  Goals of Care: The patient endorses that what is most important right now is to focus on remaining as independent as possible    Accordingly, we have decided that the best plan to meet the patient's goals includes continuing with treatment         Significant Labs: All pertinent labs within the past 24 hours have been reviewed.  CBC:   Recent Labs   Lab 12/02/24  0336   WBC 9.26   HGB 7.6*   HCT 23.4*   MCV 77*   PLT 45*      BMP:  Recent Labs   Lab 12/02/24  0858   GLU 97   *   K 3.8      CO2 23   BUN 13   CREATININE 2.2*   CALCIUM 7.8*   MG 2.2     LFT:  Lab Results   Component Value Date    AST 14 12/02/2024    GGT 30 12/13/2022    ALKPHOS 41 12/02/2024    BILITOT 3.4 (H) 12/02/2024     Albumin:   Albumin   Date Value Ref Range Status   12/02/2024 2.5 (L) 3.5 - 5.2 g/dL Final   12/02/2024 2.4 (L) 3.5 - 5.2 g/dL Final     Protein:   Total Protein   Date Value Ref Range Status   12/02/2024 5.2 (L) 6.0 - 8.4 g/dL Final     Lactic acid:   Lab Results   Component Value Date    LACTATE 2.7 (H) 11/28/2024    LACTATE 2.9 (H) 11/28/2024       Significant Imaging: I have reviewed all pertinent imaging results/findings within the past 24 hours.      20 minutes of ACP completed.       Mimi Guerrero, CNS  Palliative Medicine  [facility] - Medical ICU

## 2024-12-02 NOTE — PLAN OF CARE
MICU DAILY GOALS     Family/Goals of care/Code Status   Code Status: Full Code    24H Vital Sign Range  Temp:  [97.7 °F (36.5 °C)-98.1 °F (36.7 °C)]   Pulse:  []   Resp:  [10-28]   BP: (99)/(55)   SpO2:  [92 %-99 %]   Arterial Line BP: (101-134)/(31-64)      Shift Events (include procedures and significant events)   Tolerated SLED/ SCUFF HD treatment overnight    AWAKE RASS: Goal - RASS Goal: 0-->alert and calm  Actual - RASS (Pedraza Agitation-Sedation Scale): drowsy    Restraint necessity: Not necessary   BREATHE SBT: Not intubated    Coordinate A & B, analgesics/sedatives Pain: managed   SAT: Not intubated   Delirium CAM-ICU: Overall CAM-ICU: Negative   Early(intubated/ Progressive (non-intubated) Mobility MOVE Screen (INTUBATED ONLY): Not intubated    Activity: Activity Management: Rolling - L1   Feeding/Nutrition Diet order: Diet/Nutrition Received: clear liquid, Specialty Diet/Nutrition Received: renal diet   Thrombus DVT prophylaxis: VTE Core Measure: (TEDs) Compression stocking therapy initiated/maintained, Pharmacological prophylaxis initiated/maintained   HOB Elevation Head of Bed (HOB) Positioning: HOB at 20-30 degrees   Ulcer Prophylaxis GI: yes   Glucose control managed Glycemic Management: blood glucose monitored   Skin Skin assessment:     Sacrum intact/not altered? Yes  Heels intact/not altered? Yes  Surgical wound? No    CHECK ONE!   (no altered skin or altered skin) and sub boxes:  [] No Altered Skin Integrity Present    []Prevention Measures Documented    [x] Altered Skin Integrity Present or Discovered   [x] LDA present in EPIC, daily doc completed              [] LDA added if not in EPIC (describe wound).                    When describing wound, do not stage, use descriptive words only.    [] Wound Image Taken (required on admit,                   transfer/discharge and every Tuesday)    Wound Care Consulted? NO   Bowel Function Resolving Ileus noted     Indwelling Catheter Necessity       Urethral Catheter 11/26/24 1738-Reason for Continuing Urinary Catheterization: Urinary retention  [REMOVED]      Urethral Catheter 11/20/24 1802 Double-lumen-Reason for Continuing Urinary Catheterization: Urinary retention    Trialysis (Dialysis) Catheter 11/24/24 0441 right internal jugular-Line Necessity Review: CRRT/HD     De-escalation Antibiotics Yes        VS and assessment per flow sheet, patient progressing towards goals as tolerated, plan of care reviewed with Juan Carlos Yoo, all concerns addressed, will continue to monitor.

## 2024-12-02 NOTE — PROGRESS NOTES
12/02/24 1758   Treatment   Treatment Type SLED   Treatment Status Restart   Dialysis Machine Number [id number]   Dialyzer Time (hours) 0   BVP (Liters) 0 L   Solutions Labeled and Current  Yes   Access Temporary Cath;Right;IJ   Catheter Dressing Intact  Yes   Alarms Engaged Yes   CRRT Comments sled restarted   Prescription   Time (Hours) 12  (6 sled/6 scuf)   Dialysate K + (mEq/L) 4   Dialysate CA + (mEq/L) 2.25   Dialysate HCO3 - (Bicarb) (mEq/L) 30   Dialysate Na + (mEq/L) 140   Cartridge Type Other  (r300)   Dialysate Flow Rate (mL/min) 200   UF Goal Rate 400 mL/hr   CRRT Hourly Documentation   Blood Flow (mL/min) 150   UF Rate 200 cc/hr   Arterial Pressure (mmHg) -20 mmHg   Venous Pressure (mmHg) 40 mmHg   Effluent Pressure (EP) (mmHg) 30 mmHg   Total UF (Hourly Cleared) (mL) 0     SLED/SCUF restarted as ordered. RIJ CVC aspirated, flushed, and accessed using aseptic technique. Lines connected and secured. Lines reversed.

## 2024-12-02 NOTE — PROGRESS NOTES
12/02/24 0326   Treatment   Treatment Type SCUF   Treatment Status Blood returned;Discontinued treatment   Dialysis Machine Number [id number]   Dialyzer Time (hours) 12.1   BVP (Liters) 96.5 L   Access Temporary Cath;Right;IJ   CRRT Comments SCUF completed   CRRT Hourly Documentation   Total UF (Hourly Cleared) (mL) 134     SLED for 8 hours then 4 hours SCUF completed. Blood returned. Lines disconnected aseptically. R IJ temporary CVC flushed with saline, Lumens capped. Machine disinfected.

## 2024-12-02 NOTE — ASSESSMENT & PLAN NOTE
MELD 3.0: 32 at 11/29/2024  9:57 PM  MELD-Na: 32 at 11/29/2024  9:57 PM  Calculated from:  Serum Creatinine: On dialysis. Using the maximum value.  Serum Sodium: 132 mmol/L at 11/29/2024  9:57 PM  Total Bilirubin: 2.9 mg/dL at 11/29/2024 10:34 AM  Serum Albumin: 2.8 g/dL at 11/29/2024  9:57 PM  INR(ratio): 2 at 11/27/2024  2:09 AM  Age at listing (hypothetical): 69 years  Sex: Male at 11/29/2024  9:57 PM    Hepatology following, pt not transplant eligible at this time.

## 2024-12-02 NOTE — ASSESSMENT & PLAN NOTE
Impression: Pt is a 70 y/o male with PMH of alcoholic cirrhosis, esophageal varices, Afib on eliquis, and HTN who presented for c/o worsening diffuse swelling as well as R arm pain. Pt has ARF. Pt has Decompensated cirrhosis Pt is a full code.     Reason for consult: GOC/planning for difficult conversations.     Goals of care/ACP:     Met with pt who is aware of his medical issues. Pt is aware he is not a candidate for liver transplant at this time. Per pt he stopped drinking 6 months ago. Per pt he is aware he has damaged himself from drinking so much but is determined to remain sober. Pt stated he is aware that even with stopping to drink he may continue to decline.   Per pt his goal at this time is to continue medical treatment. Per pt he wants to continue to f/u with Dr. Cota in Hepatology clinic.   Pt reports prior to hospitalizations he was independent and lives alone.   One of pt's goal is to remain independent.     MPOA: pt report his son, Juan Carlos Yoo Jr is MPOA. He reported he did paperwork.     Symptom management:     Debility:   Would have PT/OT see and evaluate pt.     JAE:  Managed per nephrology     Plan per nephrology  -- Stress dosing with Fludrocortisone and Hydrocortisone as HRS patients tend to have AI-- 11/27 d/c steroids  --appreciate nephrology recommendations continuing dialysis.   - 11/29 MAP goal > 60-65    Pt reports no pain.     Plan:   Will continue to follow.

## 2024-12-03 LAB
ALBUMIN SERPL BCP-MCNC: 2.4 G/DL (ref 3.5–5.2)
ALBUMIN SERPL BCP-MCNC: 2.4 G/DL (ref 3.5–5.2)
ALBUMIN SERPL BCP-MCNC: 2.5 G/DL (ref 3.5–5.2)
ALBUMIN SERPL BCP-MCNC: 2.5 G/DL (ref 3.5–5.2)
ALBUMIN SERPL BCP-MCNC: 2.6 G/DL (ref 3.5–5.2)
ALP SERPL-CCNC: 42 U/L (ref 40–150)
ALP SERPL-CCNC: 44 U/L (ref 40–150)
ALT SERPL W/O P-5'-P-CCNC: 7 U/L (ref 10–44)
ALT SERPL W/O P-5'-P-CCNC: 9 U/L (ref 10–44)
ANION GAP SERPL CALC-SCNC: 7 MMOL/L (ref 8–16)
ANION GAP SERPL CALC-SCNC: 8 MMOL/L (ref 8–16)
AST SERPL-CCNC: 14 U/L (ref 10–40)
AST SERPL-CCNC: 17 U/L (ref 10–40)
BASOPHILS # BLD AUTO: 0.02 K/UL (ref 0–0.2)
BASOPHILS NFR BLD: 0.2 % (ref 0–1.9)
BILIRUB DIRECT SERPL-MCNC: 1.6 MG/DL (ref 0.1–0.3)
BILIRUB SERPL-MCNC: 3.5 MG/DL (ref 0.1–1)
BILIRUB SERPL-MCNC: 3.8 MG/DL (ref 0.1–1)
BUN SERPL-MCNC: 10 MG/DL (ref 8–23)
BUN SERPL-MCNC: 11 MG/DL (ref 8–23)
BUN SERPL-MCNC: 11 MG/DL (ref 8–23)
BUN SERPL-MCNC: 8 MG/DL (ref 8–23)
CALCIUM SERPL-MCNC: 7.6 MG/DL (ref 8.7–10.5)
CALCIUM SERPL-MCNC: 7.8 MG/DL (ref 8.7–10.5)
CALCIUM SERPL-MCNC: 7.9 MG/DL (ref 8.7–10.5)
CALCIUM SERPL-MCNC: 7.9 MG/DL (ref 8.7–10.5)
CHLORIDE SERPL-SCNC: 103 MMOL/L (ref 95–110)
CHLORIDE SERPL-SCNC: 103 MMOL/L (ref 95–110)
CHLORIDE SERPL-SCNC: 107 MMOL/L (ref 95–110)
CHLORIDE SERPL-SCNC: 108 MMOL/L (ref 95–110)
CHLORIDE UR-SCNC: 21 MMOL/L (ref 25–200)
CO2 SERPL-SCNC: 18 MMOL/L (ref 23–29)
CO2 SERPL-SCNC: 18 MMOL/L (ref 23–29)
CO2 SERPL-SCNC: 19 MMOL/L (ref 23–29)
CO2 SERPL-SCNC: 20 MMOL/L (ref 23–29)
CREAT SERPL-MCNC: 1.9 MG/DL (ref 0.5–1.4)
CREAT SERPL-MCNC: 2.1 MG/DL (ref 0.5–1.4)
CREAT UR-MCNC: 200 MG/DL (ref 23–375)
DIFFERENTIAL METHOD BLD: ABNORMAL
EOSINOPHIL # BLD AUTO: 0.2 K/UL (ref 0–0.5)
EOSINOPHIL NFR BLD: 2 % (ref 0–8)
ERYTHROCYTE [DISTWIDTH] IN BLOOD BY AUTOMATED COUNT: 23.9 % (ref 11.5–14.5)
EST. GFR  (NO RACE VARIABLE): 33 ML/MIN/1.73 M^2
EST. GFR  (NO RACE VARIABLE): 37.2 ML/MIN/1.73 M^2
GLUCOSE SERPL-MCNC: 107 MG/DL (ref 70–110)
GLUCOSE SERPL-MCNC: 107 MG/DL (ref 70–110)
GLUCOSE SERPL-MCNC: 121 MG/DL (ref 70–110)
GLUCOSE SERPL-MCNC: 122 MG/DL (ref 70–110)
HCT VFR BLD AUTO: 23.6 % (ref 40–54)
HGB BLD-MCNC: 7.6 G/DL (ref 14–18)
IMM GRANULOCYTES # BLD AUTO: 0.04 K/UL (ref 0–0.04)
IMM GRANULOCYTES NFR BLD AUTO: 0.5 % (ref 0–0.5)
LYMPHOCYTES # BLD AUTO: 0.6 K/UL (ref 1–4.8)
LYMPHOCYTES NFR BLD: 7 % (ref 18–48)
MAGNESIUM SERPL-MCNC: 1.8 MG/DL (ref 1.6–2.6)
MAGNESIUM SERPL-MCNC: 1.9 MG/DL (ref 1.6–2.6)
MCH RBC QN AUTO: 24.7 PG (ref 27–31)
MCHC RBC AUTO-ENTMCNC: 32.2 G/DL (ref 32–36)
MCV RBC AUTO: 77 FL (ref 82–98)
MONOCYTES # BLD AUTO: 1.5 K/UL (ref 0.3–1)
MONOCYTES NFR BLD: 17.4 % (ref 4–15)
NEUTROPHILS # BLD AUTO: 6.1 K/UL (ref 1.8–7.7)
NEUTROPHILS NFR BLD: 72.9 % (ref 38–73)
NRBC BLD-RTO: 0 /100 WBC
PHOSPHATE SERPL-MCNC: 2.4 MG/DL (ref 2.7–4.5)
PHOSPHATE SERPL-MCNC: 3.5 MG/DL (ref 2.7–4.5)
PHOSPHATE SERPL-MCNC: 3.5 MG/DL (ref 2.7–4.5)
PHOSPHATE SERPL-MCNC: 3.7 MG/DL (ref 2.7–4.5)
PHOSPHATE SERPL-MCNC: 3.7 MG/DL (ref 2.7–4.5)
PLATELET # BLD AUTO: 45 K/UL (ref 150–450)
PMV BLD AUTO: 10.2 FL (ref 9.2–12.9)
POTASSIUM SERPL-SCNC: 3.5 MMOL/L (ref 3.5–5.1)
POTASSIUM SERPL-SCNC: 3.5 MMOL/L (ref 3.5–5.1)
POTASSIUM SERPL-SCNC: 3.6 MMOL/L (ref 3.5–5.1)
POTASSIUM SERPL-SCNC: 3.8 MMOL/L (ref 3.5–5.1)
PROT SERPL-MCNC: 5.3 G/DL (ref 6–8.4)
PROT SERPL-MCNC: 5.5 G/DL (ref 6–8.4)
RBC # BLD AUTO: 3.08 M/UL (ref 4.6–6.2)
SODIUM SERPL-SCNC: 129 MMOL/L (ref 136–145)
SODIUM SERPL-SCNC: 129 MMOL/L (ref 136–145)
SODIUM SERPL-SCNC: 134 MMOL/L (ref 136–145)
SODIUM SERPL-SCNC: 135 MMOL/L (ref 136–145)
SODIUM UR-SCNC: <10 MMOL/L (ref 20–250)
WBC # BLD AUTO: 8.32 K/UL (ref 3.9–12.7)

## 2024-12-03 PROCEDURE — 97110 THERAPEUTIC EXERCISES: CPT

## 2024-12-03 PROCEDURE — 83735 ASSAY OF MAGNESIUM: CPT | Mod: 91 | Performed by: STUDENT IN AN ORGANIZED HEALTH CARE EDUCATION/TRAINING PROGRAM

## 2024-12-03 PROCEDURE — 97535 SELF CARE MNGMENT TRAINING: CPT

## 2024-12-03 PROCEDURE — 82436 ASSAY OF URINE CHLORIDE: CPT | Performed by: INTERNAL MEDICINE

## 2024-12-03 PROCEDURE — 90945 DIALYSIS ONE EVALUATION: CPT

## 2024-12-03 PROCEDURE — 25000003 PHARM REV CODE 250

## 2024-12-03 PROCEDURE — 99291 CRITICAL CARE FIRST HOUR: CPT | Mod: ,,, | Performed by: INTERNAL MEDICINE

## 2024-12-03 PROCEDURE — 99233 SBSQ HOSP IP/OBS HIGH 50: CPT | Mod: GT,,, | Performed by: CLINICAL NURSE SPECIALIST

## 2024-12-03 PROCEDURE — 84100 ASSAY OF PHOSPHORUS: CPT

## 2024-12-03 PROCEDURE — 63600175 PHARM REV CODE 636 W HCPCS

## 2024-12-03 PROCEDURE — 82570 ASSAY OF URINE CREATININE: CPT | Performed by: INTERNAL MEDICINE

## 2024-12-03 PROCEDURE — 97530 THERAPEUTIC ACTIVITIES: CPT

## 2024-12-03 PROCEDURE — 85025 COMPLETE CBC W/AUTO DIFF WBC: CPT

## 2024-12-03 PROCEDURE — 25000003 PHARM REV CODE 250: Performed by: INTERNAL MEDICINE

## 2024-12-03 PROCEDURE — 80076 HEPATIC FUNCTION PANEL: CPT

## 2024-12-03 PROCEDURE — 83735 ASSAY OF MAGNESIUM: CPT

## 2024-12-03 PROCEDURE — 80053 COMPREHEN METABOLIC PANEL: CPT

## 2024-12-03 PROCEDURE — 80069 RENAL FUNCTION PANEL: CPT | Performed by: STUDENT IN AN ORGANIZED HEALTH CARE EDUCATION/TRAINING PROGRAM

## 2024-12-03 PROCEDURE — 99233 SBSQ HOSP IP/OBS HIGH 50: CPT | Mod: ,,, | Performed by: INTERNAL MEDICINE

## 2024-12-03 PROCEDURE — 20000000 HC ICU ROOM

## 2024-12-03 PROCEDURE — 99222 1ST HOSP IP/OBS MODERATE 55: CPT | Mod: ,,, | Performed by: INTERNAL MEDICINE

## 2024-12-03 PROCEDURE — 84300 ASSAY OF URINE SODIUM: CPT | Performed by: INTERNAL MEDICINE

## 2024-12-03 PROCEDURE — 25000003 PHARM REV CODE 250: Performed by: STUDENT IN AN ORGANIZED HEALTH CARE EDUCATION/TRAINING PROGRAM

## 2024-12-03 PROCEDURE — 80100008 HC CRRT DAILY MAINTENANCE

## 2024-12-03 PROCEDURE — 94761 N-INVAS EAR/PLS OXIMETRY MLT: CPT

## 2024-12-03 PROCEDURE — 63600175 PHARM REV CODE 636 W HCPCS: Performed by: INTERNAL MEDICINE

## 2024-12-03 PROCEDURE — 80069 RENAL FUNCTION PANEL: CPT | Mod: 91 | Performed by: STUDENT IN AN ORGANIZED HEALTH CARE EDUCATION/TRAINING PROGRAM

## 2024-12-03 RX ORDER — MAGNESIUM SULFATE 1 G/100ML
1 INJECTION INTRAVENOUS ONCE
Status: COMPLETED | OUTPATIENT
Start: 2024-12-03 | End: 2024-12-03

## 2024-12-03 RX ORDER — METOPROLOL TARTRATE 25 MG/1
12.5 TABLET ORAL 2 TIMES DAILY
Status: DISCONTINUED | OUTPATIENT
Start: 2024-12-03 | End: 2024-12-21

## 2024-12-03 RX ORDER — MIDODRINE HYDROCHLORIDE 5 MG/1
20 TABLET ORAL 3 TIMES DAILY
Status: DISCONTINUED | OUTPATIENT
Start: 2024-12-03 | End: 2024-12-04

## 2024-12-03 RX ORDER — MAGNESIUM SULFATE HEPTAHYDRATE 40 MG/ML
2 INJECTION, SOLUTION INTRAVENOUS
Status: DISPENSED | OUTPATIENT
Start: 2024-12-03 | End: 2024-12-04

## 2024-12-03 RX ORDER — MIDODRINE HYDROCHLORIDE 5 MG/1
5 TABLET ORAL ONCE
Status: COMPLETED | OUTPATIENT
Start: 2024-12-03 | End: 2024-12-03

## 2024-12-03 RX ORDER — HYDROCODONE BITARTRATE AND ACETAMINOPHEN 500; 5 MG/1; MG/1
TABLET ORAL CONTINUOUS
Status: ACTIVE | OUTPATIENT
Start: 2024-12-03 | End: 2024-12-04

## 2024-12-03 RX ADMIN — MEROPENEM 1 G: 1 INJECTION, POWDER, FOR SOLUTION INTRAVENOUS at 05:12

## 2024-12-03 RX ADMIN — MIDODRINE HYDROCHLORIDE 15 MG: 5 TABLET ORAL at 09:12

## 2024-12-03 RX ADMIN — PANTOPRAZOLE SODIUM 40 MG: 40 TABLET, DELAYED RELEASE ORAL at 09:12

## 2024-12-03 RX ADMIN — LACTULOSE 20 G: 20 SOLUTION ORAL at 03:12

## 2024-12-03 RX ADMIN — METOPROLOL TARTRATE 12.5 MG: 25 TABLET, FILM COATED ORAL at 08:12

## 2024-12-03 RX ADMIN — SODIUM PHOSPHATE, MONOBASIC, MONOHYDRATE AND SODIUM PHOSPHATE, DIBASIC, ANHYDROUS 30 MMOL: 142; 276 INJECTION, SOLUTION INTRAVENOUS at 05:12

## 2024-12-03 RX ADMIN — MIDODRINE HYDROCHLORIDE 5 MG: 5 TABLET ORAL at 09:12

## 2024-12-03 RX ADMIN — LACTULOSE 30 G: 20 SOLUTION ORAL at 08:12

## 2024-12-03 RX ADMIN — MAGNESIUM SULFATE HEPTAHYDRATE 1 G: 500 INJECTION, SOLUTION INTRAMUSCULAR; INTRAVENOUS at 07:12

## 2024-12-03 RX ADMIN — TAMSULOSIN HYDROCHLORIDE 0.4 MG: 0.4 CAPSULE ORAL at 08:12

## 2024-12-03 RX ADMIN — NOREPINEPHRINE BITARTRATE 0.06 MCG/KG/MIN: 16 SOLUTION INTRAVENOUS at 08:12

## 2024-12-03 RX ADMIN — MIDODRINE HYDROCHLORIDE 20 MG: 5 TABLET ORAL at 08:12

## 2024-12-03 RX ADMIN — MIDODRINE HYDROCHLORIDE 20 MG: 5 TABLET ORAL at 03:12

## 2024-12-03 RX ADMIN — METOPROLOL TARTRATE 12.5 MG: 25 TABLET, FILM COATED ORAL at 09:12

## 2024-12-03 RX ADMIN — SODIUM CHLORIDE: 9 INJECTION, SOLUTION INTRAVENOUS at 08:12

## 2024-12-03 RX ADMIN — NOREPINEPHRINE BITARTRATE 0.04 MCG/KG/MIN: 16 SOLUTION INTRAVENOUS at 06:12

## 2024-12-03 NOTE — ASSESSMENT & PLAN NOTE
JAE is likely due to pre-renal azotemia due to intravascular volume depletion secondary to decompensated hepatic cirrhosis with volume overload and acute tubular necrosis caused by hemodynamic instability, renal hypoperfusion, severe sepsis with GNR bacteremia. Initial presentation concerning for hepatorenal syndrome, transferred to MICU for vasopressors without success in reaching MAP goal 85-90 for treatment of HRS. Currently, patient with oligouric JAE more contributed by ATN as above.     Recommendations:    -Plan for 8 hours SCUF followed by 4 hours SLED tonight for removal of uremic toxins and volume control -400cc/hr goal is to remove 1-2 L  -Recommend goals of care discussion regarding current prognosis and liver transplant candidacy   -Avoid nephrotoxins and renally dose meds for GFR listed above  -Monitor urine output, serial BMP, and adjust therapy as needed  -Hemodialysis consent obtained & placed in patient chart

## 2024-12-03 NOTE — PLAN OF CARE
MICU DAILY GOALS     Family/Goals of care/Code Status   Code Status: Full Code    24H Vital Sign Range  Temp:  [97.5 °F (36.4 °C)-98.1 °F (36.7 °C)]   Pulse:  []   Resp:  [9-23]   SpO2:  [92 %-99 %]   Arterial Line BP: ()/(30-64)      Shift Events (include procedures and significant events)   No acute events throughout shift. Pt remains on levophed gtt at 0.04mcg/kg/min. Pt ran on SCUF for 6hrs and then currently on SLED for 6hrs. Goal UF of 400mls/hr achieved. Clotted tubing once, partial blood able to be rinsed back. Sodium phosphate replacing this morning. Bladder scanned pt this am, 145mls visualized, will pass info to oncoming shift.    AWAKE RASS: Goal - RASS Goal: 0-->alert and calm  Actual - RASS (Pedraza Agitation-Sedation Scale): alert and calm    Restraint necessity: Not necessary   BREATHE SBT: Not intubated    Coordinate A & B, analgesics/sedatives Pain: managed   SAT: Not intubated   Delirium CAM-ICU: Overall CAM-ICU: Negative   Early(intubated/ Progressive (non-intubated) Mobility MOVE Screen (INTUBATED ONLY): Not intubated    Activity: Activity Management: Rolling - L1   Feeding/Nutrition Diet order: Diet/Nutrition Received: clear liquid, Specialty Diet/Nutrition Received: renal diet   Thrombus DVT prophylaxis: VTE Core Measure: (SCDs) Sequential compression device initiated/maintained   HOB Elevation Head of Bed (HOB) Positioning: HOB at 30-45 degrees   Ulcer Prophylaxis GI: yes   Glucose control managed Glycemic Management: blood glucose monitored   Skin Skin assessment:     Sacrum intact/not altered? Yes  Heels intact/not altered? Yes  Surgical wound? No    CHECK ONE!   (no altered skin or altered skin) and sub boxes:  [] No Altered Skin Integrity Present    []Prevention Measures Documented    [x] Altered Skin Integrity Present or Discovered   [x] LDA present in EPIC, daily doc completed              [] LDA added if not in EPIC (describe wound).                    When describing  wound, do not stage, use descriptive words only.    [] Wound Image Taken (required on admit,                   transfer/discharge and every Tuesday)    Wound Care Consulted? Yes   Bowel Function diarrhea    Indwelling Catheter Necessity [REMOVED]      Urethral Catheter 11/26/24 1738-Reason for Continuing Urinary Catheterization: Urinary retention  [REMOVED]      Urethral Catheter 11/20/24 1802 Double-lumen-Reason for Continuing Urinary Catheterization: Urinary retention    Trialysis (Dialysis) Catheter 11/24/24 0441 right internal jugular-Line Necessity Review: CRRT/HD     De-escalation Antibiotics Yes        VS and assessment per flow sheet, patient progressing towards goals as tolerated, plan of care reviewed with family, all concerns addressed, will continue to monitor.

## 2024-12-03 NOTE — CONSULTS
"Asked to revisit patient for "ID previously following pt, was on pip-tazo for for B. Diminuta however, changed to meropenem due to concerns for thrombocytopenia. Recs regarding abx duration." In review, he has been of 13 days of appropriate therapy for his bacteremia, and therefore, tomorrow should be his last day of his bacteremia treatment.   Thanks, CB  "

## 2024-12-03 NOTE — PROGRESS NOTES
12/03/24 0817   Treatment   Treatment Type SLED   Treatment Status Discontinued treatment;Blood returned   Dialyzer Time (hours) 4.2   BVP (Liters) 0.1 L   CRRT Hourly Documentation   Total UF (Hourly Cleared) (mL) 192     Tx completed. Blood returned. Pt lines disconnected, flushed with NS, clamped, and capped. Machine disinfected at this time.

## 2024-12-03 NOTE — PROGRESS NOTES
12/03/24 0000   Treatment   Treatment Type SCUF   Treatment Status Order change;Daily equipment check   Dialysis Machine Number [id number]   Dialyzer Time (hours) 6   BVP (Liters) 66.2 L   Solutions Labeled and Current  Yes   Access Temporary Cath   Catheter Dressing Intact  Yes   Alarms Engaged Yes   CRRT Comments order change, daily check done   CRRT Sodium Chloride   CRRT Sodium Chloride Volume 200.07 mL   CRRT Hourly Documentation   Blood Flow (mL/min) 200   UF Rate 400 cc/hr   Arterial Pressure (mmHg) -50 mmHg   Venous Pressure (mmHg) 190 mmHg   Effluent Pressure (EP) (mmHg) 30 mmHg   Total UF (Hourly Cleared) (mL) 380     Order change to 6 hr scuf tx, daily check done, line secured

## 2024-12-03 NOTE — PT/OT/SLP PROGRESS
Occupational Therapy   Co-Treatment    Name: Juan Carlos Yoo Sr.  MRN: [id number]  Admitting Diagnosis:  Decompensated cirrhosis       Recommendations:     Discharge Recommendations: Moderate Intensity Therapy  Discharge Equipment Recommendations:  wheelchair, hospital bed, lift device, bedside commode, bath bench, grab bar  Barriers to discharge:  Decreased caregiver support    Assessment:     Juan Carlos Yoo Sr. is a 69 y.o. male with a medical diagnosis of Decompensated cirrhosis.  He presents alert and cooperative. Pt with tachycardia at rest, but MAP WDL on .04 levo. Pt with increased HR during session, though not sustained for prolonged time.  Performance deficits affecting function are weakness, gait instability, impaired cardiopulmonary response to activity, decreased upper extremity function, decreased lower extremity function, impaired balance, impaired endurance, impaired self care skills, impaired functional mobility, pain, decreased safety awareness. Pt benefits from co-treatment with PT to accommodate pt's activity tolerance and progression with therapy.      Rehab Prognosis:  Good; patient would benefit from acute skilled OT services to address these deficits and reach maximum level of function.       Plan:     Patient to be seen 4 x/week to address the above listed problems via self-care/home management, therapeutic activities, therapeutic exercises  Plan of Care Expires: 12/27/24  Plan of Care Reviewed with: patient    Subjective     Chief Complaint: B knee pain  Patient/Family Comments/goals: to get better and go home  Pain/Comfort:  Location 1:  (Pt endorses anterior knee pain, but does not rate)  Pain Addressed 1: Reposition, Distraction    Objective:     Communicated with: RN prior to session.  Patient found supine with blood pressure cuff, pulse ox (continuous), telemetry, arterial line, Trialysis, PureWick, pressure relief boots, peripheral IV upon OT entry to room.    General Precautions: Standard,  fall    Orthopedic Precautions:N/A  Braces: N/A  Respiratory Status: Room air     Occupational Performance:     Bed Mobility:    Patient completed Rolling/Turning to Right with maximal assistance  Patient completed Scooting/Bridging with maximal assistance  Patient completed Supine to Sit with maximal assistance and 2 persons  Patient completed Sit to Supine with total assistance and 2 persons     Functional Mobility/Transfers:  Patient completed Sit <> Stand Transfer with moderate assistance and of 2 persons  with  hand-held assist   Functional Mobility: Min A x 2 for R sidesteps to HOB    Activities of Daily Living:  Grooming: minimum assistance for combing hair  Lower Body Dressing: total assistance    Toileting: total assistance for pericare in standing      [facility] 6 Click ADL: 12    Treatment & Education:  Pt ed on OT POC  RN approved activity with tachycardia in 150s  Pt sat EOB with SBA and stood x ~3 min during pericare with Min A needed for static standing  Pt ed on ROM ex's daily for increased overall strength and endurance to reduce risk of hospital acquired weakness  Pt ed on safety with ADL and fall risk  Reinforced use of call button to contact nursing staff for assistance with mobility      Patient left HOB elevated with all lines intact, call button in reach, and RN notified    GOALS:   Multidisciplinary Problems       Occupational Therapy Goals          Problem: Occupational Therapy    Goal Priority Disciplines Outcome Interventions   Occupational Therapy Goal     OT, PT/OT Progressing    Description: Goals to be met by: 12/25/24     Patient will increase functional independence with ADLs by performing:    UE Dressing with Set-up Assistance.  LE Dressing with Stand-by Assistance.  Grooming while standing at sink with Stand-by Assistance.  Toileting from toilet with Stand-by Assistance for hygiene and clothing management.   Step transfer: with SBA and use of LRAD  Supine to sit with SBA  Toilet  transfer to toilet with SBA and use of LRAD.  Independent with HEP to BUE to improve ROM                         Time Tracking:     OT Date of Treatment: 12/03/24  OT Start Time: 0829  OT Stop Time: 0852  OT Total Time (min): 23 min    Billable Minutes:Self Care/Home Management 15  Therapeutic Activity 8               12/3/2024

## 2024-12-03 NOTE — PT/OT/SLP PROGRESS
Physical Therapy Co-Treatment    Patient Name:  Juan Carlos Yoo Sr.   MRN:  [id number]  Admitting Diagnosis:  Decompensated cirrhosis   Recent Surgery: * No surgery found *    Admit Date: 11/20/2024  Length of Stay: 13 days    Recommendations:     Discharge Recommendations:  Moderate Intensity Therapy     Discharge Equipment Recommendations: hospital bed, lift device, wheelchair, bedside commode   Barriers to discharge: Decreased caregiver support and increased need for skilled assistance    Plan:     During this hospitalization, patient to be seen 4 x/week to address the identified rehab impairments via gait training, therapeutic activities, therapeutic exercises, neuromuscular re-education and progress towards the established goals.  Plan of Care Expires:  12/27/24  Plan of Care Reviewed with: patient    Assessment:     Juan Carlos Yoo Sr. is a 69 y.o. male admitted with a medical diagnosis of Decompensated cirrhosis. Pt found supine agreeable to therapy session. Pt tolerated session fairly as pt able to progress to stand and side steps this date, but limited by tachycardia and hypotension with mobility this date. RN with HR goal 150s prior to session. Pt with x1 brief period of max  bpm during session, but HR mainly 130-150s bpm throughout session. Pt with MAP 48 after sit to stand transfer though asymptomatic, so returned to supine position. Pt remains limited in functional mobility by his deficits listed below. Patient would benefit from skilled therapy services to maximize safety and independence, increase activity tolerance, decrease fall risk, decrease caregiver burden, improve QOL, improve patient's functional mobility, and decrease risk of contractures and pressure sores. Patient continues to demonstrate the need for moderate intensity therapy on a daily basis post acute exhibited by decreased independence with functional mobility    Problem List: weakness, impaired endurance, impaired self care skills,  "impaired functional mobility, gait instability, impaired balance, decreased upper extremity function, decreased lower extremity function, impaired cardiopulmonary response to activity, decreased safety awareness.  Rehab Prognosis: Good; patient would benefit from acute skilled PT services to address these deficits and reach maximum level of function.      Goals:   Multidisciplinary Problems       Physical Therapy Goals          Problem: Physical Therapy    Goal Priority Disciplines Outcome Interventions   Physical Therapy Goal     PT, PT/OT Progressing    Description: Goals to be met by: 24     Patient will increase functional independence with mobility by performin. Supine to sit with Minimal Assistance  2. Sit to stand transfer with Stand By Assistance  3. Bed to chair transfer with Stand By Assistance using LRAD  4. Gait  x 150 feet with Stand By Assistance using No LRAD.                          Subjective     RN notified prior to session. No one present upon PT entrance into room. Patient agreeable to PT treatment session.    Chief Complaint: "I had a rough night I don't feel like getting up today"  Patient/Family Comments/goals: increase strength  Pain/Comfort:  Pain Rating 1: other (see comments) (unrated)  Location - Orientation 1: anterior  Location 1: knee  Pain Addressed 1: Reposition, Distraction      Objective:     Additional staff present: OT; OT for cotx due to pt's multiple medical comorbidities and functional deficits req'ing two skilled therapists to appropriately maximize functional potential by progressing pt's musculoskeletal strength and endurance, facilitating neuromuscular postural balance and control ,and accommodating for patient's impaired cardiopulmonary tolerance to activities.     Patient found supine with: blood pressure cuff, telemetry, pulse ox (continuous), arterial line, Trialysis, peripheral IV, pressure relief boots, SCD, PureWick   Cognition:   Alert and " "Cooperative  Patient is oriented to Person, Place, Time, Situation  General Precautions: Standard, Cardiac fall   Orthopedic Precautions:N/A   Braces: N/A   Body mass index is 43.34 kg/m².  Oxygen Device: Room Air  Vitals: BP (!) 139/44   Pulse 89   Temp 97.7 °F (36.5 °C) (Oral)   Resp 17   Ht 6' 1" (1.854 m)   Wt (!) 149 kg (328 lb 7.8 oz)   SpO2 98%   BMI 43.34 kg/m²     Outcome Measures:  AM-PAC 6 CLICK MOBILITY  Turning over in bed (including adjusting bedclothes, sheets and blankets)?: 2  Sitting down on and standing up from a chair with arms (e.g., wheelchair, bedside commode, etc.): 2  Moving from lying on back to sitting on the side of the bed?: 2  Moving to and from a bed to a chair (including a wheelchair)?: 2  Need to walk in hospital room?: 2  Climbing 3-5 steps with a railing?: 1  Basic Mobility Total Score: 11     Functional Mobility:    Bed Mobility:   Rolling to right with maximal assistance  Supine > Sit with maximal assistance and 2 persons  Sit > Supine with total assistance and 2 persons    Transfers:   Sit <> Stand Transfer: moderate assistance and of 2 persons with no assistive device   Bed <> Chair Transfer: Not performed 2/2 pt safety    Balance:  Sitting Balance  Static: stand by assistance  Dynamic: stand by assistance  Standing:  Static: minimum assistance  Time: 3 minutes  Pt worked on standing balance and endurance while completing ADLs with OT at the sink.  Dynamic: minimum assistance and of 2 persons      Gait:  Patient ambulated: 4 side steps R along EOB   Patient required: minimal assist and of 2 persons  Patient used:  no assistive device and hand-held assist   Gait Deviation(s): occasional unsteady gait, wide base of support, flexed posture, and decreased osmar  all lines remained intact throughout ambulation trial  Comments: Patient required cues to increase step clearance and for upright posture.     Treatment and Education:    Pt with HR 120s prior to session. Pt with " -150 bpms throughout most of session, but with x1 brief period of 160 bpm at EOB.   Pt with MAP 48 sitting EOB after stand trial, but denying all symptoms but returned to supine for safety.   RN notified of events this session.   Time provided for education, counseling and discussion of health disposition in regards to patient's current status  All questions answered within PT scope of practice and to patient's satisfaction  PT role in POC to address current functional deficits  Call nursing/pct to transfer to chair/use bathroom. Pt stated understanding.    Patient left supine with all lines intact, call button in reach, and RN notified.    Time Tracking:     PT Received On: 12/03/24  PT Start Time: 0829     PT Stop Time: 0852  PT Total Time (min): 23 min     Billable Minutes:   Therapeutic Activity 10 minutes and Therapeutic Exercise 13 minutes       PT/PTA: PT       12/3/2024

## 2024-12-03 NOTE — SUBJECTIVE & OBJECTIVE
Interval History/Significant Events: NAEON. Patient reported having 3-4 BM overnight, is feeling some relief from them. Reports some mild     Review of Systems  Objective:     Vital Signs (Most Recent):  Temp: 97.7 °F (36.5 °C) (12/03/24 0736)  Pulse: (!) 123 (12/03/24 0810)  Resp: 15 (12/03/24 0810)  BP: (!) 99/55 (12/01/24 0645)  SpO2: 97 % (12/03/24 0810) Vital Signs (24h Range):  Temp:  [97.5 °F (36.4 °C)-98.1 °F (36.7 °C)] 97.7 °F (36.5 °C)  Pulse:  [] 123  Resp:  [9-23] 15  SpO2:  [92 %-99 %] 97 %  Arterial Line BP: ()/(30-64) 122/37   Weight: (!) 149 kg (328 lb 7.8 oz)  Body mass index is 43.34 kg/m².      Intake/Output Summary (Last 24 hours) at 12/3/2024 0838  Last data filed at 12/3/2024 0817  Gross per 24 hour   Intake 3806.34 ml   Output 4822 ml   Net -1015.66 ml          Physical Exam  Vitals and nursing note reviewed.   Constitutional:       General: He is awake. He is not in acute distress.     Appearance: He is obese. He is ill-appearing. He is not toxic-appearing.   HENT:      Head: Normocephalic and atraumatic.   Eyes:      General: No scleral icterus.     Extraocular Movements: Extraocular movements intact.      Conjunctiva/sclera: Conjunctivae normal.   Cardiovascular:      Rate and Rhythm: Tachycardia present. Rhythm irregular.   Pulmonary:      Effort: Pulmonary effort is normal. No respiratory distress.   Abdominal:      General: There is distension.      Palpations: Abdomen is soft.      Tenderness: There is no abdominal tenderness. There is no guarding or rebound.   Musculoskeletal:         General: No swelling or tenderness.      Right lower leg: Edema present.      Left lower leg: Edema present.      Comments: 2+ pitting edema in bilateral lower extremities   Skin:     General: Skin is warm.      Coloration: Skin is not jaundiced.      Findings: Bruising present.   Neurological:      Mental Status: He is alert and oriented to person, place, and time.      Motor: No weakness.             Vents:     Lines/Drains/Airways       Central Venous Catheter Line  Duration             Trialysis (Dialysis) Catheter 11/24/24 0441 right internal jugular 9 days              Drain  Duration             Male External Urine Management Device w/ Suction 12/02/24 1103 <1 day              Arterial Line  Duration             Arterial Line 11/24/24 0317 Right Radial 9 days              Peripheral Intravenous Line  Duration                  Peripheral IV - Single Lumen 11/27/24 1101 20 G Anterior;Left Forearm 5 days                  Significant Labs:    CBC/Anemia Profile:  Recent Labs   Lab 12/02/24  0336 12/03/24  0320   WBC 9.26 8.32   HGB 7.6* 7.6*   HCT 23.4* 23.6*   PLT 45* 45*   MCV 77* 77*   RDW 23.5* 23.9*        Chemistries:  Recent Labs   Lab 12/02/24  0336 12/02/24  0858 12/02/24  1446 12/02/24  2143 12/03/24  0320   * 131* 135*  135*  135* 136  136  136  136 135*   K 3.7 3.8 3.7  3.7  3.7 3.6  3.6  3.6  3.6 3.6    104 107  107  107 107  107  107  107 108   CO2 21* 23 19*  19*  19* 20*  20*  20*  20* 20*   BUN 12 13 15  15  15 9  9  9  9 8   CREATININE 2.0* 2.2* 2.4*  2.4*  2.4* 1.7*  1.7*  1.7*  1.7* 1.9*   CALCIUM 7.9* 7.8* 7.9*  7.9*  7.9* 7.4*  7.4*  7.4*  7.4* 7.6*   ALBUMIN 2.5* 2.4*  2.5* 2.5*  2.5*  2.5* 2.5*  2.5*  2.5*  2.5* 2.5*   PROT 5.3* 5.2*  --   --  5.5*   BILITOT 3.8* 3.4*  --   --  3.5*   ALKPHOS 44 41  --   --  44   ALT 8* 8*  --   --  9*   AST 14 14  --   --  17   MG 2.1 2.2 2.1  2.1  2.1 1.9  1.9  1.9  1.9 1.9   PHOS 2.2* 2.5* 2.4*  2.4*  2.4* 2.5*  2.5*  2.5*  2.5* 2.4*       All pertinent labs within the past 24 hours have been reviewed.    Significant Imaging:  I have reviewed all pertinent imaging results/findings within the past 24 hours.

## 2024-12-03 NOTE — PROGRESS NOTES
[facility] - Medical ICU  Nephrology  Progress Note    Patient Name: Juan Carlos Yoo Sr.  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 Recent admission 10/4 at [facility] for decompensated hepatic cirrhosis where he was discharged with oral diuretic regimen including furosemide 60 mg BID and metolazone 2.5 mg daily, low salt diet with fluid restriction. It is unclear if patient is adherent to these medications or lifestyle modifications. W/u notable for anemia with hb 6.7 s/p 1 unit pRBC 11/20 and JAE on CKD w elevated BUN 49/creatinine 5.9, hyperkalemia (K 6.1>5.1 after shifting), bicarb 18, elevated lactate up to 3.5. Nephrology consulted for JAE with c/o HRS    Interval History:     Status post scuff/sled total of 12 hours, on pressors, urine output 90 mL, creatinine 1.9, trended up from 1.7, CVP 20, we will continue fluid removed room, 8 hours of scuf followed by 4 hours of sled    Review of patient's allergies indicates:  No Known Allergies  Current Facility-Administered Medications   Medication Frequency    0.9%  NaCl infusion (CRRT USE ONLY) Continuous    0.9%  NaCl infusion (CRRT USE ONLY) Continuous    albuterol-ipratropium 2.5 mg-0.5 mg/3 mL nebulizer solution 3 mL Q6H PRN    aluminum-magnesium hydroxide-simethicone 200-200-20 mg/5 mL suspension 30 mL QID PRN    dextrose 10% bolus 125 mL 125 mL PRN    dextrose 10% bolus 250 mL 250 mL PRN    glucagon (human recombinant) injection 1 mg PRN    glucose chewable tablet  16 g PRN    glucose chewable tablet 24 g PRN    lactulose 20 gram/30 mL solution Soln 30 g TID    magnesium sulfate 2g in water 50mL IVPB (premix) PRN    melatonin tablet 6 mg Nightly PRN    meropenem injection 1 g Q12H    metoprolol tartrate (LOPRESSOR) split tablet 12.5 mg BID    midodrine tablet 20 mg TID    naloxone 0.4 mg/mL injection 0.02 mg PRN    NORepinephrine 4 mg in sodium chloride 0.9% 250 mL infusion (premix) Continuous    ondansetron injection 4 mg Q8H PRN    pantoprazole EC tablet 40 mg Daily    simethicone chewable tablet 80 mg QID PRN    sodium chloride 0.9% flush 10 mL Q12H PRN    sodium chloride 0.9% flush 10 mL PRN    sodium phosphate 20.01 mmol in D5W 250 mL IVPB PRN    sodium phosphate 30 mmol in D5W 250 mL IVPB PRN    sodium phosphate 39.99 mmol in D5W 250 mL IVPB PRN    tamsulosin 24 hr capsule 0.4 mg QHS       Objective:     Vital Signs (Most Recent):  Temp: 98.2 °F (36.8 °C) (12/03/24 1700)  Pulse: 87 (12/03/24 1700)  Resp: 15 (12/03/24 1700)  BP: (!) 139/44 (12/03/24 0915)  SpO2: 97 % (12/03/24 1700) Vital Signs (24h Range):  Temp:  [97.5 °F (36.4 °C)-98.2 °F (36.8 °C)] 98.2 °F (36.8 °C)  Pulse:  [] 87  Resp:  [12-28] 15  SpO2:  [93 %-99 %] 97 %  BP: (139)/(44) 139/44  Arterial Line BP: (107-148)/(30-58) 116/42     Weight: (!) 149 kg (328 lb 7.8 oz) (12/02/24 0400)  Body mass index is 43.34 kg/m².  Body surface area is 2.77 meters squared.    I/O last 3 completed shifts:  In: 6734.9 [P.O.:400; I.V.:5876.5; IV Piggyback:458.4]  Out: 7020 [Urine:160; Other:6860]     Physical Exam  Constitutional:       General: He is not in acute distress.     Appearance: He is ill-appearing.   Eyes:      Extraocular Movements: Extraocular movements intact.      Pupils: Pupils are equal, round, and reactive to light.   Cardiovascular:      Rate and Rhythm: Normal rate.      Heart sounds: No murmur heard.  Pulmonary:      Effort: No respiratory distress.      Breath sounds: No wheezing or rales.    Abdominal:      General: There is distension.      Tenderness: There is no abdominal tenderness. There is no guarding.   Musculoskeletal:      Right lower leg: Edema present.      Left lower leg: Edema present.   Skin:     Coloration: Skin is pale. Skin is not jaundiced.   Neurological:      Mental Status: He is oriented to person, place, and time.   Psychiatric:         Behavior: Behavior normal.          Significant Labs:  CBC:   Recent Labs   Lab 12/03/24  0320   WBC 8.32   RBC 3.08*   HGB 7.6*   HCT 23.6*   PLT 45*   MCV 77*   MCH 24.7*   MCHC 32.2     CMP:   Recent Labs   Lab 12/03/24  0907 12/03/24  1328   GLU  --  121*   CALCIUM  --  7.8*   ALBUMIN 2.5* 2.6*   PROT 5.3*  --    NA  --  134*   K  --  3.8   CO2  --  19*   CL  --  107   BUN  --  10   CREATININE  --  2.1*   ALKPHOS 42  --    ALT 7*  --    AST 14  --    BILITOT 3.8*  --      LFTs:   Recent Labs   Lab 12/03/24  0907 12/03/24  1328   ALT 7*  --    AST 14  --    ALKPHOS 42  --    BILITOT 3.8*  --    PROT 5.3*  --    ALBUMIN 2.5* 2.6*     Assessment/Plan:     Renal/  JAE (acute kidney injury)  JAE is likely due to pre-renal azotemia due to intravascular volume depletion secondary to decompensated hepatic cirrhosis with volume overload and acute tubular necrosis caused by hemodynamic instability, renal hypoperfusion, severe sepsis with GNR bacteremia. Initial presentation concerning for hepatorenal syndrome, transferred to MICU for vasopressors without success in reaching MAP goal 85-90 for treatment of HRS. Currently, patient with oligouric JAE more contributed by ATN as above.     Recommendations:    -Plan for 8 hours SCUF followed by 4 hours SLED tonight for removal of uremic toxins and volume control -400cc/hr goal is to remove 1-2 L  -Recommend goals of care discussion regarding current prognosis and liver transplant candidacy   -Avoid nephrotoxins and renally dose meds for GFR listed above  -Monitor urine output, serial BMP, and adjust  therapy as needed  -Hemodialysis consent obtained & placed in patient chart        Thank you for your consult. I will follow-up with patient. Please contact us if you have any additional questions.    Irma Hernandez MD  Nephrology  [facility] - Medical ICU

## 2024-12-03 NOTE — PLAN OF CARE
[facility] - Medical ICU  Discharge Reassessment    Primary Care Provider: Nyla Rios FNP    Expected Discharge Date: 12/5/2024    Reassessment (most recent)       Discharge Reassessment - 12/03/24 1501          Discharge Reassessment    Assessment Type Discharge Planning Reassessment     Did the patient's condition or plan change since previous assessment? Yes     Discharge Plan discussed with: Adult children     Communicated AUTUMN with patient/caregiver Date not available/Unable to determine     Discharge Plan A Skilled Nursing Facility     Discharge Plan B Home Health     DME Needed Upon Discharge  other (see comments)   TBD    Transition of Care Barriers None     Why the patient remains in the hospital Requires continued medical care        Post-Acute Status    Post-Acute Authorization Placement     Post-Acute Placement Status Pending medical clearance/testing     Discharge Delays None known at this time                 SW spoke with son about discharge planning options.  Juan Carlos MAYURI Yoo Jr., son/URIEL cp#[phone], has requested nursing home placement because patient's  increase need for skill services.      Patient not stable for discharge at this time.  PT/OT recs are moderate intensity therapy.  SW will continue to follow patient's progress to discharge from MICU.  CM team remains available for any family concerns or needs.    Selwyn Davis, THERESE  [facility]  [phone]

## 2024-12-03 NOTE — PROGRESS NOTES
Advance Care Planning   [facility] - Medical ICU  Palliative Care       Patient Name: Juan Carlos Yoo Sr.  MRN: [id number]  Admission Date: 11/20/2024  Hospital Length of Stay: 13 days  Code Status: Full Code   Attending Provider: Evelyn Amos MD  Palliative Care Provider:   Primary Care Physician: Nyla Rios FNP  Principal Problem:Decompensated cirrhosis     LCSW, along with NEIL HUGO, visited pt at bedside. Pt has no new concerns today. Aware that he will be dialyzed again today. No questions. Will remain available as needed.     Ml Woo, MANDEEP-BACS, [facility]  Department of Palliative Medicine

## 2024-12-03 NOTE — SUBJECTIVE & OBJECTIVE
Interval History:     Status post scuff/sled total of 12 hours, on pressors, urine output 90 mL, creatinine 1.9, trended up from 1.7, CVP 20, we will continue fluid removed room, 8 hours of scuf followed by 4 hours of sled    Review of patient's allergies indicates:  No Known Allergies  Current Facility-Administered Medications   Medication Frequency    0.9%  NaCl infusion (CRRT USE ONLY) Continuous    0.9%  NaCl infusion (CRRT USE ONLY) Continuous    albuterol-ipratropium 2.5 mg-0.5 mg/3 mL nebulizer solution 3 mL Q6H PRN    aluminum-magnesium hydroxide-simethicone 200-200-20 mg/5 mL suspension 30 mL QID PRN    dextrose 10% bolus 125 mL 125 mL PRN    dextrose 10% bolus 250 mL 250 mL PRN    glucagon (human recombinant) injection 1 mg PRN    glucose chewable tablet 16 g PRN    glucose chewable tablet 24 g PRN    lactulose 20 gram/30 mL solution Soln 30 g TID    magnesium sulfate 2g in water 50mL IVPB (premix) PRN    melatonin tablet 6 mg Nightly PRN    meropenem injection 1 g Q12H    metoprolol tartrate (LOPRESSOR) split tablet 12.5 mg BID    midodrine tablet 20 mg TID    naloxone 0.4 mg/mL injection 0.02 mg PRN    NORepinephrine 4 mg in sodium chloride 0.9% 250 mL infusion (premix) Continuous    ondansetron injection 4 mg Q8H PRN    pantoprazole EC tablet 40 mg Daily    simethicone chewable tablet 80 mg QID PRN    sodium chloride 0.9% flush 10 mL Q12H PRN    sodium chloride 0.9% flush 10 mL PRN    sodium phosphate 20.01 mmol in D5W 250 mL IVPB PRN    sodium phosphate 30 mmol in D5W 250 mL IVPB PRN    sodium phosphate 39.99 mmol in D5W 250 mL IVPB PRN    tamsulosin 24 hr capsule 0.4 mg QHS       Objective:     Vital Signs (Most Recent):  Temp: 98.2 °F (36.8 °C) (12/03/24 1700)  Pulse: 87 (12/03/24 1700)  Resp: 15 (12/03/24 1700)  BP: (!) 139/44 (12/03/24 0915)  SpO2: 97 % (12/03/24 1700) Vital Signs (24h Range):  Temp:  [97.5 °F (36.4 °C)-98.2 °F (36.8 °C)] 98.2 °F (36.8 °C)  Pulse:  [] 87  Resp:  [12-28]  15  SpO2:  [93 %-99 %] 97 %  BP: (139)/(44) 139/44  Arterial Line BP: (107-148)/(30-58) 116/42     Weight: (!) 149 kg (328 lb 7.8 oz) (12/02/24 0400)  Body mass index is 43.34 kg/m².  Body surface area is 2.77 meters squared.    I/O last 3 completed shifts:  In: 6734.9 [P.O.:400; I.V.:5876.5; IV Piggyback:458.4]  Out: 7020 [Urine:160; Other:6860]     Physical Exam  Constitutional:       General: He is not in acute distress.     Appearance: He is ill-appearing.   Eyes:      Extraocular Movements: Extraocular movements intact.      Pupils: Pupils are equal, round, and reactive to light.   Cardiovascular:      Rate and Rhythm: Normal rate.      Heart sounds: No murmur heard.  Pulmonary:      Effort: No respiratory distress.      Breath sounds: No wheezing or rales.   Abdominal:      General: There is distension.      Tenderness: There is no abdominal tenderness. There is no guarding.   Musculoskeletal:      Right lower leg: Edema present.      Left lower leg: Edema present.   Skin:     Coloration: Skin is pale. Skin is not jaundiced.   Neurological:      Mental Status: He is oriented to person, place, and time.   Psychiatric:         Behavior: Behavior normal.          Significant Labs:  CBC:   Recent Labs   Lab 12/03/24  0320   WBC 8.32   RBC 3.08*   HGB 7.6*   HCT 23.6*   PLT 45*   MCV 77*   MCH 24.7*   MCHC 32.2     CMP:   Recent Labs   Lab 12/03/24  0907 12/03/24  1328   GLU  --  121*   CALCIUM  --  7.8*   ALBUMIN 2.5* 2.6*   PROT 5.3*  --    NA  --  134*   K  --  3.8   CO2  --  19*   CL  --  107   BUN  --  10   CREATININE  --  2.1*   ALKPHOS 42  --    ALT 7*  --    AST 14  --    BILITOT 3.8*  --      LFTs:   Recent Labs   Lab 12/03/24  0907 12/03/24  1328   ALT 7*  --    AST 14  --    ALKPHOS 42  --    BILITOT 3.8*  --    PROT 5.3*  --    ALBUMIN 2.5* 2.6*

## 2024-12-03 NOTE — SUBJECTIVE & OBJECTIVE
Interval History: Pt has alcoholic cirrhosis/pt on SLED. Pt on NE.     Past Medical History:   Diagnosis Date    Atrial fibrillation     Bulging disc     Cirrhosis     Colon polyp 12/29/2017    Rpt 5 yrs    EV (esophageal varices)     Hypertension 3/30/2023    Skin cancer 03/2017    Thrombocytopenia        Past Surgical History:   Procedure Laterality Date    CATHETERIZATION OF BOTH LEFT AND RIGHT HEART N/A 2/8/2023    Procedure: CATHETERIZATION, HEART, BOTH LEFT AND RIGHT;  Surgeon: Flo Malone MD;  Location: [facility] CATH LAB;  Service: Cardiology;  Laterality: N/A;  low bleeding risk 1.0%    CHOLECYSTECTOMY      COLONOSCOPY      COLONOSCOPY N/A 12/29/2017    ta rpt 2022    CORONARY ANGIOGRAPHY N/A 2/8/2023    Procedure: ANGIOGRAM, CORONARY ARTERY;  Surgeon: Flo Malone MD;  Location: [facility] CATH LAB;  Service: Cardiology;  Laterality: N/A;    HERNIA REPAIR      SKIN BIOPSY  03/2017    TONSILLECTOMY      UPPER GASTROINTESTINAL ENDOSCOPY  2011    EV    UPPER GASTROINTESTINAL ENDOSCOPY  2016    VASECTOMY         Review of patient's allergies indicates:  No Known Allergies    Medications:  Continuous Infusions:   sodium chloride 0.9%   Intravenous Continuous   Stopped at 12/03/24 0813    NORepinephrine bitartrate-D5W  0-3 mcg/kg/min Intravenous Continuous 33.3 mL/hr at 12/03/24 0929 0.06 mcg/kg/min at 12/03/24 0929     Scheduled Meds:   lactulose  30 g Oral TID    meropenem IV (PEDS and ADULTS)  1 g Intravenous Q12H    metoprolol tartrate  12.5 mg Oral BID    midodrine  20 mg Oral TID    pantoprazole  40 mg Oral Daily    tamsulosin  1 capsule Oral QHS     PRN Meds:  Current Facility-Administered Medications:     albuterol-ipratropium, 3 mL, Nebulization, Q6H PRN    aluminum-magnesium hydroxide-simethicone, 30 mL, Oral, QID PRN    dextrose 10%, 12.5 g, Intravenous, PRN    dextrose 10%, 25 g, Intravenous, PRN    glucagon (human recombinant), 1 mg, Intramuscular, PRN    glucose, 16 g, Oral, PRN    glucose, 24 g,  Oral, PRN    magnesium sulfate IVPB, 2 g, Intravenous, PRN    melatonin, 6 mg, Oral, Nightly PRN    naloxone, 0.02 mg, Intravenous, PRN    ondansetron, 4 mg, Intravenous, Q8H PRN    simethicone, 1 tablet, Oral, QID PRN    sodium chloride 0.9%, 10 mL, Intravenous, Q12H PRN    sodium chloride 0.9%, 10 mL, Intravenous, PRN    sodium phosphate 20.01 mmol in D5W 250 mL IVPB, 20.01 mmol, Intravenous, PRN    sodium phosphate 30 mmol in D5W 250 mL IVPB, 30 mmol, Intravenous, PRN    sodium phosphate 39.99 mmol in D5W 250 mL IVPB, 39.99 mmol, Intravenous, PRN    Family History       Problem Relation (Age of Onset)    Cancer Maternal Uncle    Heart disease Maternal Uncle    Hyperlipidemia Brother    Ovarian cancer Sister          Tobacco Use    Smoking status: Never    Smokeless tobacco: Never   Substance and Sexual Activity    Alcohol use: Not Currently     Alcohol/week: 0.0 standard drinks of alcohol    Drug use: No    Sexual activity: Not on file       Review of Systems   Constitutional:  Positive for activity change.   Respiratory:  Negative for shortness of breath.    Cardiovascular:  Positive for leg swelling.   Gastrointestinal:  Positive for constipation.   Neurological:  Positive for weakness.     Objective:     Vital Signs (Most Recent):  Temp: 97.7 °F (36.5 °C) (12/03/24 0736)  Pulse: (!) 129 (12/03/24 0915)  Resp: (!) 28 (12/03/24 0915)  BP: (!) 139/44 (12/03/24 0915)  SpO2: 98 % (12/03/24 0915) Vital Signs (24h Range):  Temp:  [97.5 °F (36.4 °C)-98.1 °F (36.7 °C)] 97.7 °F (36.5 °C)  Pulse:  [] 129  Resp:  [9-28] 28  SpO2:  [92 %-99 %] 98 %  BP: (139)/(44) 139/44  Arterial Line BP: ()/(30-64) 139/44     Weight: (!) 149 kg (328 lb 7.8 oz)  Body mass index is 43.34 kg/m².       Physical Exam  Constitutional:       Appearance: He is overweight.   HENT:      Head: Normocephalic and atraumatic.   Pulmonary:      Effort: Pulmonary effort is normal.   Musculoskeletal:      Comments: Edema noted to extremities.     Skin:     General: Skin is warm and dry.   Neurological:      Mental Status: He is alert and oriented to person, place, and time.   Psychiatric:         Behavior: Behavior is cooperative.            Review of Symptoms      Symptom Assessment (ESAS 0-10 Scale)  Pain:  0  Dyspnea:  0  Anxiety:  0  Nausea:  0  Depression:  0  Anorexia:  0  Fatigue:  0  Insomnia:  0  Restlessness:  0  Agitation:  0       Constipation:  Constipation    Psychosocial/Cultural:   See Palliative Psychosocial Note: Yes  **Primary  to Follow**  Palliative Care  Consult: Yes        Advance Care Planning   Advance Directives:   Living Will: No    LaPOST: No    Do Not Resuscitate Status: No    Medical Power of : No    Agent's Name:  Pt reports he has fille rylee TREVINO and his son is URIEL.    Decision Making:  Patient answered questions  Goals of Care: The patient endorses that what is most important right now is to focus on remaining as independent as possible    Accordingly, we have decided that the best plan to meet the patient's goals includes continuing with treatment         Significant Labs: All pertinent labs within the past 24 hours have been reviewed.  CBC:   Recent Labs   Lab 12/03/24  0320   WBC 8.32   HGB 7.6*   HCT 23.6*   MCV 77*   PLT 45*     BMP:  Recent Labs   Lab 12/03/24  0320   *   *   K 3.6      CO2 20*   BUN 8   CREATININE 1.9*   CALCIUM 7.6*   MG 1.9     LFT:  Lab Results   Component Value Date    AST 17 12/03/2024    GGT 30 12/13/2022    ALKPHOS 44 12/03/2024    BILITOT 3.5 (H) 12/03/2024     Albumin:   Albumin   Date Value Ref Range Status   12/03/2024 2.5 (L) 3.5 - 5.2 g/dL Final     Protein:   Total Protein   Date Value Ref Range Status   12/03/2024 5.5 (L) 6.0 - 8.4 g/dL Final     Lactic acid:   Lab Results   Component Value Date    LACTATE 2.7 (H) 11/28/2024    LACTATE 2.9 (H) 11/28/2024       Significant Imaging: I have reviewed all pertinent imaging  results/findings within the past 24 hours.

## 2024-12-03 NOTE — ASSESSMENT & PLAN NOTE
Impression: Pt is a 68 y/o male with PMH of alcoholic cirrhosis, esophageal varices, Afib on eliquis, and HTN who presented for c/o worsening diffuse swelling as well as R arm pain. Pt has ARF. Pt has Decompensated cirrhosis Pt is a full code.     Reason for consult: GOC/planning for difficult conversations.     Goals of care/ACP:     Met with pt who is aware of his medical issues. Pt is aware he is not a candidate for liver transplant at this time. Per pt he stopped drinking 6 months ago. Per pt he is aware he has damaged himself from drinking so much but is determined to remain sober. Pt stated he is aware that even with stopping to drink he may continue to decline.   Per pt his goal at this time is to continue medical treatment. Per pt he wants to continue to f/u with Dr. Cota in Hepatology clinic.   Pt reports prior to hospitalizations he was independent and lives alone.   One of pt's goal is to remain independent    Pt reports feeling fatigued today/no pain.     MPOA: pt report his son, Juan Carlos Yoo Jr is MPOA. He reported he did paperwork.     Symptom management:     Debility:   Would have PT/OT see and evaluate pt.     JAE:  Managed per nephrology     Plan per nephrology  -- Stress dosing with Fludrocortisone and Hydrocortisone as HRS patients tend to have AI-- 11/27 d/c steroids  --appreciate nephrology recommendations continuing dialysis.   - 11/29 MAP goal > 60-65    Pt reports no pain.     Plan:   Will continue to follow.   Will f/u outpt/virtual visit. Pt lives in [place].

## 2024-12-03 NOTE — PROGRESS NOTES
[facility] - Medical ICU  Palliative Medicine  Progress Note    Patient Name: Juan Carlos Yoo Sr.  MRN: [id number]  Admission Date: 11/20/2024  Hospital Length of Stay: 13 days  Code Status: Full Code   Attending Provider: Evelyn Amos MD  Consulting Provider: ROE Alcaraz  Primary Care Physician: Nyla Rios FNP  Principal Problem:Decompensated cirrhosis    Patient information was obtained from patient and primary team.      Assessment/Plan:     Palliative Care  Palliative care encounter  Impression: Pt is a 68 y/o male with PMH of alcoholic cirrhosis, esophageal varices, Afib on eliquis, and HTN who presented for c/o worsening diffuse swelling as well as R arm pain. Pt has ARF. Pt has Decompensated cirrhosis Pt is a full code.     Reason for consult: GOC/planning for difficult conversations.     Goals of care/ACP:     Met with pt who is aware of his medical issues. Pt is aware he is not a candidate for liver transplant at this time. Per pt he stopped drinking 6 months ago. Per pt he is aware he has damaged himself from drinking so much but is determined to remain sober. Pt stated he is aware that even with stopping to drink he may continue to decline.   Per pt his goal at this time is to continue medical treatment. Per pt he wants to continue to f/u with Dr. Cota in Hepatology clinic.   Pt reports prior to hospitalizations he was independent and lives alone.   One of pt's goal is to remain independent    Pt reports feeling fatigued today/no pain.     MPOA: pt report his son, Juan Carlos Yoo Jr is MPOA. He reported he did paperwork.     Symptom management:     Debility:   Would have PT/OT see and evaluate pt.     JAE:  Managed per nephrology     Plan per nephrology  -- Stress dosing with Fludrocortisone and Hydrocortisone as HRS patients tend to have AI-- 11/27 d/c steroids  --appreciate nephrology recommendations continuing dialysis.   - 11/29 MAP goal > 60-65    Pt reports no pain.     Plan:   Will continue  "to follow.   Will f/u outpt/virtual visit. Pt lives in [place].             I will follow-up with patient. Please contact us if you have any additional questions.    Subjective:     Chief Complaint:   Chief Complaint   Patient presents with    Shortness of Breath     Has been seen at multiple hospitals recently "and wants his medication straightened out.  Making me shake"  endorses SOB.  Sweling noted to left hand with wrap in place to lower forearm       HPI:   Juan Carlos Yoo is a 69 male with PMH of alcoholic cirrhosis, esophageal varices, Afib on eliquis, and HTN who presents for c/o worsening diffuse swelling as well as R arm pain/erythema. Per chart review, pt was recently hospitalized 1 month ago at [facility] for volume overload in the setting of decompensated cirrhosis. He was treated with IV diuresis and discharged with adjustment to his diuretics, currently on lasix 60mg BID and metolazone 2.5mg every other week as per family. Patient reports diffuse swelling of his upper and lower extremities in addition to distended abdomen and worsening dyspnea, which he believes is due to recent medication adjustment. Family states he is non-compliant with low sodium diet and fluid restriction. Family states he has been compliant with diuretics, but rom't taken eliquis for 3 days due to issue getting refill. He also reports scratching right arm on door frame 3-4 days ago which has become red and painful after wrapping in in bandage. He claims to be compliant with medications, but is a poor historian. He follows with hepatology, not currently active on transplant list. He states he hasn't consumed alcohol for at least 9 months. Has c/o chills, but denies fever, cough, nausea, vomiting, chest pain, dysuria, hematuria, blood in stool. Workup in ED remarkable for VS: T 97.6 HR 94 RR 22 BP 95/56 O2 sat 97% on RA. Hb 6.7, MCV 75, Plt 81, PT/INR 22.6/2.2, Na 128, K 6.1, BUN/Cr 49/5.7, Alb 2.8, AST/ALT 35/9, Ammonia 104, BNP " 363, Trop neg, LA 2.9, CXR: Cardiac size is enlarged similar to prior.  No large volume of pleural fluid noted although there is mild blunting of the right costophrenic angle which may relate to a small amount of pleural fluid.  Suspected right basilar opacity, to be correlated clinically for infection. RUE venous doppler: No thrombus in central veins of the right upper extremity. Patient received vanc/zosyn and lasix 80mg IVP in ED and will be admitted to hospital medicine service for further management.     Pt was admitted to hospital medicine. Blood cultures positive for Gram-positive cocci and Gram-negative rods. ID has been consulted. S/p 2 units PRBC for Hemoglobin dropped 6.8 on 11/21. Pt was on Eliquis for atrial fibrillation prior to admission which was held.  Hepatology following patient's clinical course, but are not evaluating him for transplant at this time. Diuretics were held because of concern for HRS.  Patient was started on albumin and midodrine per Nephrology recommendations for HRS.  Renal function continued to worsen and recommendation per Nephrology to transfer to ICU for maintaining goal map over 85 on Levophed.     Pt off pressor support and on dialysis.     Hospital Course:  No notes on file    Interval History: Pt has alcoholic cirrhosis/pt on SLED. Pt on NE.     Past Medical History:   Diagnosis Date    Atrial fibrillation     Bulging disc     Cirrhosis     Colon polyp 12/29/2017    Rpt 5 yrs    EV (esophageal varices)     Hypertension 3/30/2023    Skin cancer 03/2017    Thrombocytopenia        Past Surgical History:   Procedure Laterality Date    CATHETERIZATION OF BOTH LEFT AND RIGHT HEART N/A 2/8/2023    Procedure: CATHETERIZATION, HEART, BOTH LEFT AND RIGHT;  Surgeon: Flo Malone MD;  Location: [facility] CATH LAB;  Service: Cardiology;  Laterality: N/A;  low bleeding risk 1.0%    CHOLECYSTECTOMY      COLONOSCOPY      COLONOSCOPY N/A 12/29/2017    ta rpt 2022    CORONARY ANGIOGRAPHY  N/A 2/8/2023    Procedure: ANGIOGRAM, CORONARY ARTERY;  Surgeon: Flo Malone MD;  Location: [facility] CATH LAB;  Service: Cardiology;  Laterality: N/A;    HERNIA REPAIR      SKIN BIOPSY  03/2017    TONSILLECTOMY      UPPER GASTROINTESTINAL ENDOSCOPY  2011    EV    UPPER GASTROINTESTINAL ENDOSCOPY  2016    VASECTOMY         Review of patient's allergies indicates:  No Known Allergies    Medications:  Continuous Infusions:   sodium chloride 0.9%   Intravenous Continuous   Stopped at 12/03/24 0813    NORepinephrine bitartrate-D5W  0-3 mcg/kg/min Intravenous Continuous 33.3 mL/hr at 12/03/24 0929 0.06 mcg/kg/min at 12/03/24 0929     Scheduled Meds:   lactulose  30 g Oral TID    meropenem IV (PEDS and ADULTS)  1 g Intravenous Q12H    metoprolol tartrate  12.5 mg Oral BID    midodrine  20 mg Oral TID    pantoprazole  40 mg Oral Daily    tamsulosin  1 capsule Oral QHS     PRN Meds:  Current Facility-Administered Medications:     albuterol-ipratropium, 3 mL, Nebulization, Q6H PRN    aluminum-magnesium hydroxide-simethicone, 30 mL, Oral, QID PRN    dextrose 10%, 12.5 g, Intravenous, PRN    dextrose 10%, 25 g, Intravenous, PRN    glucagon (human recombinant), 1 mg, Intramuscular, PRN    glucose, 16 g, Oral, PRN    glucose, 24 g, Oral, PRN    magnesium sulfate IVPB, 2 g, Intravenous, PRN    melatonin, 6 mg, Oral, Nightly PRN    naloxone, 0.02 mg, Intravenous, PRN    ondansetron, 4 mg, Intravenous, Q8H PRN    simethicone, 1 tablet, Oral, QID PRN    sodium chloride 0.9%, 10 mL, Intravenous, Q12H PRN    sodium chloride 0.9%, 10 mL, Intravenous, PRN    sodium phosphate 20.01 mmol in D5W 250 mL IVPB, 20.01 mmol, Intravenous, PRN    sodium phosphate 30 mmol in D5W 250 mL IVPB, 30 mmol, Intravenous, PRN    sodium phosphate 39.99 mmol in D5W 250 mL IVPB, 39.99 mmol, Intravenous, PRN    Family History       Problem Relation (Age of Onset)    Cancer Maternal Uncle    Heart disease Maternal Uncle    Hyperlipidemia Brother    Ovarian  cancer Sister          Tobacco Use    Smoking status: Never    Smokeless tobacco: Never   Substance and Sexual Activity    Alcohol use: Not Currently     Alcohol/week: 0.0 standard drinks of alcohol    Drug use: No    Sexual activity: Not on file       Review of Systems   Constitutional:  Positive for activity change.   Respiratory:  Negative for shortness of breath.    Cardiovascular:  Positive for leg swelling.   Gastrointestinal:  Positive for constipation.   Neurological:  Positive for weakness.     Objective:     Vital Signs (Most Recent):  Temp: 97.7 °F (36.5 °C) (12/03/24 0736)  Pulse: (!) 129 (12/03/24 0915)  Resp: (!) 28 (12/03/24 0915)  BP: (!) 139/44 (12/03/24 0915)  SpO2: 98 % (12/03/24 0915) Vital Signs (24h Range):  Temp:  [97.5 °F (36.4 °C)-98.1 °F (36.7 °C)] 97.7 °F (36.5 °C)  Pulse:  [] 129  Resp:  [9-28] 28  SpO2:  [92 %-99 %] 98 %  BP: (139)/(44) 139/44  Arterial Line BP: ()/(30-64) 139/44     Weight: (!) 149 kg (328 lb 7.8 oz)  Body mass index is 43.34 kg/m².       Physical Exam  Constitutional:       Appearance: He is overweight.   HENT:      Head: Normocephalic and atraumatic.   Pulmonary:      Effort: Pulmonary effort is normal.   Musculoskeletal:      Comments: Edema noted to extremities.    Skin:     General: Skin is warm and dry.   Neurological:      Mental Status: He is alert and oriented to person, place, and time.   Psychiatric:         Behavior: Behavior is cooperative.            Review of Symptoms      Symptom Assessment (ESAS 0-10 Scale)  Pain:  0  Dyspnea:  0  Anxiety:  0  Nausea:  0  Depression:  0  Anorexia:  0  Fatigue:  0  Insomnia:  0  Restlessness:  0  Agitation:  0       Constipation:  Constipation    Psychosocial/Cultural:   See Palliative Psychosocial Note: Yes  **Primary  to Follow**  Palliative Care  Consult: Yes        Advance Care Planning  Advance Directives:   Living Will: No    LaPOST: No    Do Not Resuscitate Status: No     Medical Power of : No    Agent's Name:  Pt reports he has fille rylee MPOA and his son is MPOCECILIA.    Decision Making:  Patient answered questions  Goals of Care: The patient endorses that what is most important right now is to focus on remaining as independent as possible    Accordingly, we have decided that the best plan to meet the patient's goals includes continuing with treatment         Significant Labs: All pertinent labs within the past 24 hours have been reviewed.  CBC:   Recent Labs   Lab 12/03/24  0320   WBC 8.32   HGB 7.6*   HCT 23.6*   MCV 77*   PLT 45*     BMP:  Recent Labs   Lab 12/03/24  0320   *   *   K 3.6      CO2 20*   BUN 8   CREATININE 1.9*   CALCIUM 7.6*   MG 1.9     LFT:  Lab Results   Component Value Date    AST 17 12/03/2024    GGT 30 12/13/2022    ALKPHOS 44 12/03/2024    BILITOT 3.5 (H) 12/03/2024     Albumin:   Albumin   Date Value Ref Range Status   12/03/2024 2.5 (L) 3.5 - 5.2 g/dL Final     Protein:   Total Protein   Date Value Ref Range Status   12/03/2024 5.5 (L) 6.0 - 8.4 g/dL Final     Lactic acid:   Lab Results   Component Value Date    LACTATE 2.7 (H) 11/28/2024    LACTATE 2.9 (H) 11/28/2024       Significant Imaging: I have reviewed all pertinent imaging results/findings within the past 24 hours.    > 50% of   55 min visit spent in chart review, face to face discussion of goals of care,  symptom assessment, coordination of care and emotional support     Mimi Guerrero, CNS  Palliative Medicine  [facility] - Medical ICU

## 2024-12-03 NOTE — ASSESSMENT & PLAN NOTE
Nephrology following  Will try to remove more fluid  Strict intake and output  Renally dose all medications  Avoid nephrotoxic agents

## 2024-12-03 NOTE — ASSESSMENT & PLAN NOTE
"This patient does have evidence of infective focus  My overall impression is sepsis.  Source: Abdominal  Antibiotics given-   Antibiotics (72h ago, onward)      Start     Stop Route Frequency Ordered    12/02/24 1800  meropenem injection 1 g         -- IV Every 12 hours (non-standard times) 12/02/24 1517          Latest lactate reviewed-  No results for input(s): "LACTATE", "POCLAC" in the last 72 hours.    Organ dysfunction indicated by Acute kidney injury    Fluid challenge Contraindicated- Fluid bolus is contraindicated in this patient due to End Stage Liver Disease     Post- resuscitation assessment No - Post resuscitation assessment not needed     Source control achieved by: antibiotics  ID consulted for recommendations regarding abx duration - 12/4 should be final day  "

## 2024-12-03 NOTE — PROGRESS NOTES
[facility]  Critical Care Medicine  Progress Note    Patient Name: Juan Carlos Yoo Sr.  MRN: [id number]  Admission Date: 11/20/2024  Hospital Length of Stay: 13 days  Code Status: Full Code  Attending Provider: Evelyn Amos MD  Primary Care Provider: Nyla Rios FNP   Principal Problem: Decompensated cirrhosis    Subjective:     HPI:  Juan Carlos Yoo is a 69 male with PMH of alcoholic cirrhosis, esophageal varices, Afib on eliquis, and HTN who presents for c/o worsening diffuse swelling as well as R arm pain/erythema. He was recently hospitalized 1 month ago at [facility] for volume overload in the setting of decompensated cirrhosis. He was treated with IV diuresis and discharged with adjustment to his diuretics, currently on lasix 60mg BID and metolazone 2.5mg every other week as per family. Patient reports diffuse swelling of his upper and lower extremities in addition to distended abdomen and worsening dyspnea, which he believes is due to recent medication adjustment. Family states he is non-compliant with low sodium diet and fluid restriction. Family states he has been compliant with diuretics, but rom't taken eliquis for 3 days due to issue getting refill. He also reports scratching right arm on door frame 3-4 days ago which has become red and painful after wrapping in in bandage. He claims to be compliant with medications, but is a poor historian. He follows with hepatology, not currently active on transplant list. He states he hasn't consumed alcohol for at least 9 months. Has c/o chills, but denies fever, cough, nausea, vomiting, chest pain, dysuria, hematuria, blood in stool. Workup in ED remarkable for VS: T 97.6 HR 94 RR 22 BP 95/56 O2 sat 97% on RA. Hb 6.7, MCV 75, Plt 81, PT/INR 22.6/2.2, Na 128, K 6.1, BUN/Cr 49/5.7, Alb 2.8, AST/ALT 35/9, Ammonia 104, , Trop neg, LA 2.9, CXR: Cardiac size is enlarged similar to prior.  No large volume of pleural fluid noted although there is mild  blunting of the right costophrenic angle which may relate to a small amount of pleural fluid.  Suspected right basilar opacity, to be correlated clinically for infection. RUE venous doppler: No thrombus in central veins of the right upper extremity. Patient received vanc/zosyn and lasix 80mg IVP in ED and will be admitted to hospital medicine service for further management.     Pt was admitted to hospital medicine. Blood cultures positive for Gram-positive cocci and Gram-negative rods. ID has been consulted. S/p 2 units PRBC for Hemoglobin dropped 6.8 on 11/21. Pt was on Eliquis for atrial fibrillation prior to admission which was held.  Hepatology following patient's clinical course, but are not evaluating him for transplant at this time. Diuretics were held because of concern for HRS.  Patient was started on albumin and midodrine per Nephrology recommendations for HRS.  Renal function continued to worsen and recommendation per Nephrology to transfer to ICU for maintaining goal map over 85 on Levophed.  ICU was notified and patient to be assessed to be transferred to ICU.       Hospital/ICU Course:  69 y.o. male with history of EtOH use disorder, EtOH cirrhosis, HCC s/p y90, morbid obesity BMI 44, HTN, and Afib who presents with severe anasarca and JAE.      Appreciate hepatology and nephrology recommendations.  Diuretics were held because of concern for HRS.  Patient was started on albumin and midodrine per Nephrology recommendations for HRS.  Renal function continued to worsen and recommendation per Nephrology to transfer to ICU for maintaining goal map over 85 on Levophed.  ICU was notified and patient to be assessed to be transferred to ICU.     Patient also has Gram-positive cocci and Gram-negative rods in his blood now.  Possible sources could be got translocation and barrier related infection through skin as he has been significantly edematous and has been oozing.  Has been on vancomycin and Rocephin.  Id  was consulted this morning.  Repeat blood cultures were obtained.     Continues to have anemia.  Hemoglobin dropped on 11/20 and was given 1 unit of packed red blood cells.  Did not respond appropriately.  Hemoglobin dropped again to 6.8 on 11/21 and was given 1 unit of packed red blood cells.  Hemoglobin again on 11/22 is 7.2.  No reported melena or hematochezia.  Patient was on Eliquis for atrial fibrillation prior to admission which was held.  Given history of esophageal varices and portal hypertensive gastropathy whereas also could be component of slow bleeding, under production due to CKD and hemolysis while also with decompensated cirrhosis.  Started on Protonix IV b.i.d. and octreotide.     Also clarified with hepatology.  Given patient's current infection we will await ID recommendations however we will be challenging to consider transplant evaluation at this time.  Trend clinical course     Goal clarification with the patient and family.  Patient at this time wants to be full code and continue with Levophed in ICU.  They would consider discussion with palliative care in a day or so if things do not get better.     In MICU patient started on Levophed, however titration remained difficult given tachycardic response from the patient.     11/24 Trialysis and arterial lines placed overnight and currently on levo and norepi. SLED today.    11/25 Boo dc. Increased midodrine. Continue steroids until d/c pressors. Discussed with Nephrology about our concern for sustained use of pressors to achieve their MAP goals of 85. Will follow up tomorrow.     11/26 Platelets 48. Repeat 38 confirming thrombocytopenia. Concern for HIT. D/c heparin. Increased lactulose dosing. Bladder scan revealed urine in bladder. Boo placed. Off vaso. Continuing levo. Maintain MAP goals 80. PT/OT consulted.     11/27 MAP goal 75-80. Heparin antibody result pending.     11/28 Pt with abdominal pain, decreased bowel movements, emesis. Lactic  acid 2.9 and repeat 2.7. Less concerned for mesenteric ischemia and more of a concern was for SBO. NG tube placed with subsequent suction of 500mL fluid. Abdomen less distended after placement and pt subsequently had bowel movement. MAP Goal of 60 with plan to come off pressors entirely and switch to dialysis after permacath, as he is not  liver transplant eligible at this time.     11/29 Pt with ileus. Clear liquids for now. Platelets 24. HIT versus zosyn induced? Peripheral smear sent. Zosyn changed to kaylah. Consent and transfuse 1U. GOC conversation today. Pt opting for long term dialysis.     11/30 naeon. Started back on heparin. One time dose of 120mg lasix today. Hold off SLED/SCUF today and give IV lasix 120 mg once; plan for SLED/SCUF tomorrow     12/1 Patient is becoming more dependent on dialysis. Not a current liver transplant candidate. Still complaining of abdominal pain after discontinuing the NGT. AXR with evidence of dilated bowel loops. Brown bomb administered. The days following administration of the brown bomb, patient had more frequent bowel movements and was able to pass flatulence.     Interval History/Significant Events: NAEON. Patient reported having 3-4 BM overnight, is feeling some relief from them. Reports some mild discomfort in the lower abdomen.     Review of Systems  Objective:     Vital Signs (Most Recent):  Temp: 97.7 °F (36.5 °C) (12/03/24 0736)  Pulse: (!) 123 (12/03/24 0810)  Resp: 15 (12/03/24 0810)  BP: (!) 99/55 (12/01/24 0645)  SpO2: 97 % (12/03/24 0810) Vital Signs (24h Range):  Temp:  [97.5 °F (36.4 °C)-98.1 °F (36.7 °C)] 97.7 °F (36.5 °C)  Pulse:  [] 123  Resp:  [9-23] 15  SpO2:  [92 %-99 %] 97 %  Arterial Line BP: ()/(30-64) 122/37   Weight: (!) 149 kg (328 lb 7.8 oz)  Body mass index is 43.34 kg/m².      Intake/Output Summary (Last 24 hours) at 12/3/2024 0838  Last data filed at 12/3/2024 0817  Gross per 24 hour   Intake 3806.34 ml   Output 4822 ml   Net  -1015.66 ml          Physical Exam  Vitals and nursing note reviewed.   Constitutional:       General: He is awake. He is not in acute distress.     Appearance: He is obese. He is ill-appearing. He is not toxic-appearing.   HENT:      Head: Normocephalic and atraumatic.   Eyes:      General: No scleral icterus.     Extraocular Movements: Extraocular movements intact.      Conjunctiva/sclera: Conjunctivae normal.   Cardiovascular:      Rate and Rhythm: Tachycardia present. Rhythm irregular.   Pulmonary:      Effort: Pulmonary effort is normal. No respiratory distress.   Abdominal:      General: There is distension.      Palpations: Abdomen is soft.      Tenderness: There is no abdominal tenderness. There is no guarding or rebound.   Musculoskeletal:         General: No swelling or tenderness.      Right lower leg: Edema present.      Left lower leg: Edema present.      Comments: 2+ pitting edema in bilateral lower extremities   Skin:     General: Skin is warm.      Coloration: Skin is not jaundiced.      Findings: Bruising present.   Neurological:      Mental Status: He is alert and oriented to person, place, and time.      Motor: No weakness.            Vents:     Lines/Drains/Airways       Central Venous Catheter Line  Duration             Trialysis (Dialysis) Catheter 11/24/24 0441 right internal jugular 9 days              Drain  Duration             Male External Urine Management Device w/ Suction 12/02/24 1103 <1 day              Arterial Line  Duration             Arterial Line 11/24/24 0317 Right Radial 9 days              Peripheral Intravenous Line  Duration                  Peripheral IV - Single Lumen 11/27/24 1101 20 G Anterior;Left Forearm 5 days                  Significant Labs:    CBC/Anemia Profile:  Recent Labs   Lab 12/02/24  0336 12/03/24  0320   WBC 9.26 8.32   HGB 7.6* 7.6*   HCT 23.4* 23.6*   PLT 45* 45*   MCV 77* 77*   RDW 23.5* 23.9*        Chemistries:  Recent Labs   Lab 12/02/24  0336  "12/02/24  0858 12/02/24  1446 12/02/24  2143 12/03/24  0320   * 131* 135*  135*  135* 136  136  136  136 135*   K 3.7 3.8 3.7  3.7  3.7 3.6  3.6  3.6  3.6 3.6    104 107  107  107 107  107  107  107 108   CO2 21* 23 19*  19*  19* 20*  20*  20*  20* 20*   BUN 12 13 15  15  15 9  9  9  9 8   CREATININE 2.0* 2.2* 2.4*  2.4*  2.4* 1.7*  1.7*  1.7*  1.7* 1.9*   CALCIUM 7.9* 7.8* 7.9*  7.9*  7.9* 7.4*  7.4*  7.4*  7.4* 7.6*   ALBUMIN 2.5* 2.4*  2.5* 2.5*  2.5*  2.5* 2.5*  2.5*  2.5*  2.5* 2.5*   PROT 5.3* 5.2*  --   --  5.5*   BILITOT 3.8* 3.4*  --   --  3.5*   ALKPHOS 44 41  --   --  44   ALT 8* 8*  --   --  9*   AST 14 14  --   --  17   MG 2.1 2.2 2.1  2.1  2.1 1.9  1.9  1.9  1.9 1.9   PHOS 2.2* 2.5* 2.4*  2.4*  2.4* 2.5*  2.5*  2.5*  2.5* 2.4*       All pertinent labs within the past 24 hours have been reviewed.    Significant Imaging:  I have reviewed all pertinent imaging results/findings within the past 24 hours.    ABG  No results for input(s): "PH", "PO2", "PCO2", "HCO3", "BE" in the last 168 hours.  Assessment/Plan:     Neuro  Encephalopathy, metabolic  AAOx4  Will assess for signs of encephalopathy     Cardiac/Vascular  Atrial fibrillation  Holding home Eliquis in the setting of recent low blood counts  Start metoprolol 12.5    Renal/  Stage 3a chronic kidney disease  Nephrology following  Will try to remove more fluid  Strict intake and output  Renally dose all medications  Avoid nephrotoxic agents    JAE (acute kidney injury)  Baseline creatinine is 1.5. May require dialysis long term.     -- Stress dosing with Fludrocortisone and Hydrocortisone as HRS patients tend to have AI-- 11/27 d/c steroids  --appreciate nephrology recommendations continuing dialysis.   - 11/29 MAP goal > 60-65    ID  Gram-negative bacteremia  Blood cultures from admission with B. Diminuta, micrococcus luteus, lysinobacillus species. Seen by ID previously who recommended " "stopping antibiotics due to concern these were contaminants. Repeat blood cultures have been no growth. Has since been transferred to ICU with increased pressor requirement. Blood cultures repeated on 11/24 are no growth x 24 hours.   11/29 Switched from zosyn to meropenem due to concern of thrombocytopenia.   Continue meropenem.  ID consulted for recommendations regarding duration.     Severe sepsis  This patient does have evidence of infective focus  My overall impression is sepsis.  Source: Abdominal  Antibiotics given-   Antibiotics (72h ago, onward)      Start     Stop Route Frequency Ordered    12/02/24 1800  meropenem injection 1 g         -- IV Every 12 hours (non-standard times) 12/02/24 1517          Latest lactate reviewed-  No results for input(s): "LACTATE", "POCLAC" in the last 72 hours.    Organ dysfunction indicated by Acute kidney injury    Fluid challenge Contraindicated- Fluid bolus is contraindicated in this patient due to End Stage Liver Disease     Post- resuscitation assessment No - Post resuscitation assessment not needed     Source control achieved by: antibiotics  ID consulted for recommendations regarding abx duration - 12/4 should be final day    Hematology  Thrombocytopenia  Daily CBC  Transfuse for PLT<10k, or PLT<50K with active bleeding  Monitor for signs and symptoms of bleeding    Coagulopathy  End Stage Liver Disease precipitated coagulopathy  Heparin d/c d/t thrombocytopenia    Endocrine  Hyponatremia  Likely dilutional secondary to end stage liver disease and HRS.   CTM, correct if appropriate    GI  * Decompensated cirrhosis  MELD 3.0: 32 at 11/29/2024  9:57 PM  MELD-Na: 32 at 11/29/2024  9:57 PM  Calculated from:  Serum Creatinine: On dialysis. Using the maximum value.  Serum Sodium: 132 mmol/L at 11/29/2024  9:57 PM  Total Bilirubin: 2.9 mg/dL at 11/29/2024 10:34 AM  Serum Albumin: 2.8 g/dL at 11/29/2024  9:57 PM  INR(ratio): 2 at 11/27/2024  2:09 AM  Age at listing " (hypothetical): 69 years  Sex: Male at 11/29/2024  9:57 PM    Hepatology following, pt not transplant eligible at this time.       Abdominal pain  Patient developed abdominal pain 2/2 constipation last week and NGT was placed. 11/30 NGT was removed. The day after the NGT was removed he developed constipation and dilated bowel loops on AXR.  Some BM after brown bomb enema.   Continue to monitor       Critical Care Daily Checklist:    A: Awake: RASS Goal/Actual Goal: RASS Goal: 0-->alert and calm  Actual:     B: Spontaneous Breathing Trial Performed?     C: SAT & SBT Coordinated?  NA                      D: Delirium: CAM-ICU Overall CAM-ICU: Negative   E: Early Mobility Performed? Yes   F: Feeding Goal: Goals: to meet % of EEN/EPN by next RD f/u  Status: Nutrition Goal Status: new   Current Diet Order   Procedures    Diet Clear Liquid Standard Tray     Order Specific Question:   Tray type:     Answer:   Standard Tray      AS: Analgesia/Sedation None   T: Thromboembolic Prophylaxis NA   H: HOB > 300 Yes   U: Stress Ulcer Prophylaxis (if needed) yes   G: Glucose Control None   B: Bowel Function Stool Occurrence: 0   I: Indwelling Catheter (Lines & Boo) Necessity Trialysis, PIV   D: De-escalation of Antimicrobials/Pharmacotherapies Meropenem    Plan for the day/ETD Fluid removal, PT/OT    Code Status:  Family/Goals of Care: Full Code         Critical secondary to Patient has a condition that poses threat to life and bodily function: Acute Renal Failure      Critical care was time spent personally by me on the following activities: development of treatment plan with patient or surrogate and bedside caregivers, discussions with consultants, evaluation of patient's response to treatment, examination of patient, ordering and performing treatments and interventions, ordering and review of laboratory studies, ordering and review of radiographic studies, pulse oximetry, re-evaluation of patient's condition. This  critical care time did not overlap with that of any other provider or involve time for any procedures.     Rhona Lewis,   Critical Care Medicine  [facility] - Medical ICU

## 2024-12-03 NOTE — PLAN OF CARE
MICU DAILY GOALS     Family/Goals of care/Code Status   Code Status: Full Code    24H Vital Sign Range  Temp:  [97.5 °F (36.4 °C)-98.2 °F (36.8 °C)]   Pulse:  []   Resp:  [12-28]   BP: (139)/(44)   SpO2:  [93 %-99 %]   Arterial Line BP: (107-148)/(30-58)      Shift Events (include procedures and significant events)   No acute events throughout shift. Bladder scanned 1x this shift and straight cathed, peacock inserted per MD order. Remains on 0.04 mcg/kg/min of levo. Nephro asked for CVP, came back as 20, team notified.       AWAKE RASS: Goal - RASS Goal: 0-->alert and calm  Actual - RASS (Pedraza Agitation-Sedation Scale): alert and calm    Restraint necessity: Not necessary   BREATHE SBT: NA    Coordinate A & B, analgesics/sedatives Pain: managed   SAT: NA   Delirium CAM-ICU: Overall CAM-ICU: Negative   Early(intubated/ Progressive (non-intubated) Mobility MOVE Screen (INTUBATED ONLY): NA    Activity: Activity Management: Rolling - L1   Feeding/Nutrition Diet order: Diet/Nutrition Received: clear liquid, Specialty Diet/Nutrition Received: renal diet   Thrombus DVT prophylaxis: VTE Core Measure: (SCDs) Sequential compression device initiated/maintained   HOB Elevation Head of Bed (HOB) Positioning: HOB at 30 degrees   Ulcer Prophylaxis GI: yes   Glucose control managed Glycemic Management: blood glucose monitored   Skin Skin assessment:     Sacrum intact/not altered? Yes  Heels intact/not altered? Yes  Surgical wound? No    CHECK ONE!   (no altered skin or altered skin) and sub boxes:  [] No Altered Skin Integrity Present    []Prevention Measures Documented    [x] Altered Skin Integrity Present or Discovered   [x] LDA present in EPIC, daily doc completed              [] LDA added if not in EPIC (describe wound).                    When describing wound, do not stage, use descriptive words only.    [x] Wound Image Taken (required on admit,                   transfer/discharge and every Tuesday)    Wound Care  Consulted? Yes   Bowel Function no issues    Indwelling Catheter Necessity [REMOVED]      Urethral Catheter 11/26/24 1738-Reason for Continuing Urinary Catheterization: Urinary retention  [REMOVED]      Urethral Catheter 11/20/24 1802 Double-lumen-Reason for Continuing Urinary Catheterization: Urinary retention    Trialysis (Dialysis) Catheter 11/24/24 0441 right internal jugular-Line Necessity Review: CRRT/HD     De-escalation Antibiotics Yes        VS and assessment per flow sheet, patient progressing towards goals as tolerated, plan of care reviewed with  patient , all concerns addressed, will continue to monitor.

## 2024-12-04 PROBLEM — R60.9 EDEMA: Status: ACTIVE | Noted: 2024-12-04

## 2024-12-04 LAB
ALBUMIN SERPL BCP-MCNC: 2.4 G/DL (ref 3.5–5.2)
ALBUMIN SERPL BCP-MCNC: 2.5 G/DL (ref 3.5–5.2)
ALBUMIN SERPL BCP-MCNC: 2.5 G/DL (ref 3.5–5.2)
ALP SERPL-CCNC: 47 U/L (ref 40–150)
ALP SERPL-CCNC: 48 U/L (ref 40–150)
ALT SERPL W/O P-5'-P-CCNC: 8 U/L (ref 10–44)
ALT SERPL W/O P-5'-P-CCNC: 8 U/L (ref 10–44)
ANION GAP SERPL CALC-SCNC: 6 MMOL/L (ref 8–16)
ANION GAP SERPL CALC-SCNC: 7 MMOL/L (ref 8–16)
AST SERPL-CCNC: 16 U/L (ref 10–40)
AST SERPL-CCNC: 17 U/L (ref 10–40)
BASOPHILS # BLD AUTO: 0.01 K/UL (ref 0–0.2)
BASOPHILS NFR BLD: 0.1 % (ref 0–1.9)
BILIRUB DIRECT SERPL-MCNC: 1.6 MG/DL (ref 0.1–0.3)
BILIRUB SERPL-MCNC: 3.5 MG/DL (ref 0.1–1)
BILIRUB SERPL-MCNC: 3.7 MG/DL (ref 0.1–1)
BUN SERPL-MCNC: 10 MG/DL (ref 8–23)
BUN SERPL-MCNC: 10 MG/DL (ref 8–23)
BUN SERPL-MCNC: 11 MG/DL (ref 8–23)
BUN SERPL-MCNC: 8 MG/DL (ref 8–23)
CALCIUM SERPL-MCNC: 7.7 MG/DL (ref 8.7–10.5)
CALCIUM SERPL-MCNC: 7.8 MG/DL (ref 8.7–10.5)
CALCIUM SERPL-MCNC: 7.9 MG/DL (ref 8.7–10.5)
CALCIUM SERPL-MCNC: 7.9 MG/DL (ref 8.7–10.5)
CHLORIDE SERPL-SCNC: 107 MMOL/L (ref 95–110)
CHLORIDE SERPL-SCNC: 108 MMOL/L (ref 95–110)
CHLORIDE SERPL-SCNC: 109 MMOL/L (ref 95–110)
CHLORIDE SERPL-SCNC: 109 MMOL/L (ref 95–110)
CO2 SERPL-SCNC: 19 MMOL/L (ref 23–29)
CO2 SERPL-SCNC: 19 MMOL/L (ref 23–29)
CO2 SERPL-SCNC: 20 MMOL/L (ref 23–29)
CO2 SERPL-SCNC: 20 MMOL/L (ref 23–29)
CORTIS SERPL-MCNC: 6.9 UG/DL
CREAT SERPL-MCNC: 1.9 MG/DL (ref 0.5–1.4)
CREAT SERPL-MCNC: 2.2 MG/DL (ref 0.5–1.4)
DIFFERENTIAL METHOD BLD: ABNORMAL
EOSINOPHIL # BLD AUTO: 0.2 K/UL (ref 0–0.5)
EOSINOPHIL NFR BLD: 2.4 % (ref 0–8)
ERYTHROCYTE [DISTWIDTH] IN BLOOD BY AUTOMATED COUNT: 23.9 % (ref 11.5–14.5)
EST. GFR  (NO RACE VARIABLE): 31.2 ML/MIN/1.73 M^2
EST. GFR  (NO RACE VARIABLE): 37.2 ML/MIN/1.73 M^2
GLUCOSE SERPL-MCNC: 112 MG/DL (ref 70–110)
GLUCOSE SERPL-MCNC: 125 MG/DL (ref 70–110)
GLUCOSE SERPL-MCNC: 127 MG/DL (ref 70–110)
GLUCOSE SERPL-MCNC: 127 MG/DL (ref 70–110)
HCT VFR BLD AUTO: 23.9 % (ref 40–54)
HGB BLD-MCNC: 7.5 G/DL (ref 14–18)
IMM GRANULOCYTES # BLD AUTO: 0.04 K/UL (ref 0–0.04)
IMM GRANULOCYTES NFR BLD AUTO: 0.5 % (ref 0–0.5)
LYMPHOCYTES # BLD AUTO: 0.8 K/UL (ref 1–4.8)
LYMPHOCYTES NFR BLD: 8.7 % (ref 18–48)
MAGNESIUM SERPL-MCNC: 1.8 MG/DL (ref 1.6–2.6)
MAGNESIUM SERPL-MCNC: 1.8 MG/DL (ref 1.6–2.6)
MAGNESIUM SERPL-MCNC: 2.2 MG/DL (ref 1.6–2.6)
MAGNESIUM SERPL-MCNC: 2.3 MG/DL (ref 1.6–2.6)
MAGNESIUM SERPL-MCNC: 2.3 MG/DL (ref 1.6–2.6)
MCH RBC QN AUTO: 24.5 PG (ref 27–31)
MCHC RBC AUTO-ENTMCNC: 31.4 G/DL (ref 32–36)
MCV RBC AUTO: 78 FL (ref 82–98)
MONOCYTES # BLD AUTO: 1.5 K/UL (ref 0.3–1)
MONOCYTES NFR BLD: 17.2 % (ref 4–15)
NEUTROPHILS # BLD AUTO: 6.3 K/UL (ref 1.8–7.7)
NEUTROPHILS NFR BLD: 71.1 % (ref 38–73)
NRBC BLD-RTO: 0 /100 WBC
PHOSPHATE SERPL-MCNC: 2.9 MG/DL (ref 2.7–4.5)
PHOSPHATE SERPL-MCNC: 3.5 MG/DL (ref 2.7–4.5)
PHOSPHATE SERPL-MCNC: 3.6 MG/DL (ref 2.7–4.5)
PHOSPHATE SERPL-MCNC: 3.6 MG/DL (ref 2.7–4.5)
PLATELET # BLD AUTO: 44 K/UL (ref 150–450)
PMV BLD AUTO: 10.2 FL (ref 9.2–12.9)
POTASSIUM SERPL-SCNC: 3.6 MMOL/L (ref 3.5–5.1)
POTASSIUM SERPL-SCNC: 3.7 MMOL/L (ref 3.5–5.1)
PROT SERPL-MCNC: 5.4 G/DL (ref 6–8.4)
PROT SERPL-MCNC: 5.5 G/DL (ref 6–8.4)
RBC # BLD AUTO: 3.06 M/UL (ref 4.6–6.2)
SODIUM SERPL-SCNC: 133 MMOL/L (ref 136–145)
SODIUM SERPL-SCNC: 133 MMOL/L (ref 136–145)
SODIUM SERPL-SCNC: 135 MMOL/L (ref 136–145)
SODIUM SERPL-SCNC: 135 MMOL/L (ref 136–145)
WBC # BLD AUTO: 8.86 K/UL (ref 3.9–12.7)

## 2024-12-04 PROCEDURE — 90945 DIALYSIS ONE EVALUATION: CPT

## 2024-12-04 PROCEDURE — 94761 N-INVAS EAR/PLS OXIMETRY MLT: CPT

## 2024-12-04 PROCEDURE — 20000000 HC ICU ROOM

## 2024-12-04 PROCEDURE — 99233 SBSQ HOSP IP/OBS HIGH 50: CPT | Mod: GT,,, | Performed by: CLINICAL NURSE SPECIALIST

## 2024-12-04 PROCEDURE — 83735 ASSAY OF MAGNESIUM: CPT | Mod: 91 | Performed by: STUDENT IN AN ORGANIZED HEALTH CARE EDUCATION/TRAINING PROGRAM

## 2024-12-04 PROCEDURE — 80100008 HC CRRT DAILY MAINTENANCE

## 2024-12-04 PROCEDURE — 99232 SBSQ HOSP IP/OBS MODERATE 35: CPT | Mod: ,,, | Performed by: INTERNAL MEDICINE

## 2024-12-04 PROCEDURE — 80069 RENAL FUNCTION PANEL: CPT | Mod: 91 | Performed by: STUDENT IN AN ORGANIZED HEALTH CARE EDUCATION/TRAINING PROGRAM

## 2024-12-04 PROCEDURE — 83735 ASSAY OF MAGNESIUM: CPT | Mod: 91 | Performed by: INTERNAL MEDICINE

## 2024-12-04 PROCEDURE — 25000003 PHARM REV CODE 250: Performed by: STUDENT IN AN ORGANIZED HEALTH CARE EDUCATION/TRAINING PROGRAM

## 2024-12-04 PROCEDURE — 25000003 PHARM REV CODE 250

## 2024-12-04 PROCEDURE — 63600175 PHARM REV CODE 636 W HCPCS: Performed by: INTERNAL MEDICINE

## 2024-12-04 PROCEDURE — 85025 COMPLETE CBC W/AUTO DIFF WBC: CPT

## 2024-12-04 PROCEDURE — 63600175 PHARM REV CODE 636 W HCPCS: Performed by: STUDENT IN AN ORGANIZED HEALTH CARE EDUCATION/TRAINING PROGRAM

## 2024-12-04 PROCEDURE — 84100 ASSAY OF PHOSPHORUS: CPT

## 2024-12-04 PROCEDURE — 83735 ASSAY OF MAGNESIUM: CPT

## 2024-12-04 PROCEDURE — 25000003 PHARM REV CODE 250: Performed by: INTERNAL MEDICINE

## 2024-12-04 PROCEDURE — 82533 TOTAL CORTISOL: CPT

## 2024-12-04 PROCEDURE — 99291 CRITICAL CARE FIRST HOUR: CPT | Mod: ,,, | Performed by: INTERNAL MEDICINE

## 2024-12-04 PROCEDURE — 80053 COMPREHEN METABOLIC PANEL: CPT

## 2024-12-04 PROCEDURE — 80076 HEPATIC FUNCTION PANEL: CPT

## 2024-12-04 RX ORDER — MAGNESIUM SULFATE HEPTAHYDRATE 40 MG/ML
2 INJECTION, SOLUTION INTRAVENOUS
Status: DISCONTINUED | OUTPATIENT
Start: 2024-12-04 | End: 2024-12-05

## 2024-12-04 RX ORDER — MIDODRINE HYDROCHLORIDE 5 MG/1
20 TABLET ORAL EVERY 8 HOURS
Status: DISCONTINUED | OUTPATIENT
Start: 2024-12-04 | End: 2024-12-24

## 2024-12-04 RX ORDER — HYDROCODONE BITARTRATE AND ACETAMINOPHEN 500; 5 MG/1; MG/1
TABLET ORAL CONTINUOUS
Status: DISCONTINUED | OUTPATIENT
Start: 2024-12-04 | End: 2024-12-05

## 2024-12-04 RX ADMIN — MEROPENEM 1 G: 1 INJECTION, POWDER, FOR SOLUTION INTRAVENOUS at 06:12

## 2024-12-04 RX ADMIN — SODIUM PHOSPHATE, MONOBASIC, MONOHYDRATE AND SODIUM PHOSPHATE, DIBASIC, ANHYDROUS 20.01 MMOL: 142; 276 INJECTION, SOLUTION INTRAVENOUS at 04:12

## 2024-12-04 RX ADMIN — SODIUM CHLORIDE: 9 INJECTION, SOLUTION INTRAVENOUS at 01:12

## 2024-12-04 RX ADMIN — MEROPENEM 1 G: 1 INJECTION, POWDER, FOR SOLUTION INTRAVENOUS at 05:12

## 2024-12-04 RX ADMIN — PANTOPRAZOLE SODIUM 40 MG: 40 TABLET, DELAYED RELEASE ORAL at 09:12

## 2024-12-04 RX ADMIN — LACTULOSE 30 G: 20 SOLUTION ORAL at 08:12

## 2024-12-04 RX ADMIN — NOREPINEPHRINE BITARTRATE 0.08 MCG/KG/MIN: 16 SOLUTION INTRAVENOUS at 11:12

## 2024-12-04 RX ADMIN — MIDODRINE HYDROCHLORIDE 20 MG: 5 TABLET ORAL at 09:12

## 2024-12-04 RX ADMIN — MAGNESIUM SULFATE HEPTAHYDRATE 2 G: 40 INJECTION, SOLUTION INTRAVENOUS at 01:12

## 2024-12-04 RX ADMIN — SODIUM CHLORIDE: 9 INJECTION, SOLUTION INTRAVENOUS at 07:12

## 2024-12-04 RX ADMIN — TAMSULOSIN HYDROCHLORIDE 0.4 MG: 0.4 CAPSULE ORAL at 08:12

## 2024-12-04 RX ADMIN — MIDODRINE HYDROCHLORIDE 20 MG: 5 TABLET ORAL at 03:12

## 2024-12-04 RX ADMIN — METOPROLOL TARTRATE 12.5 MG: 25 TABLET, FILM COATED ORAL at 08:12

## 2024-12-04 RX ADMIN — NOREPINEPHRINE BITARTRATE 0.06 MCG/KG/MIN: 16 SOLUTION INTRAVENOUS at 06:12

## 2024-12-04 RX ADMIN — MAGNESIUM SULFATE HEPTAHYDRATE 2 G: 40 INJECTION, SOLUTION INTRAVENOUS at 04:12

## 2024-12-04 RX ADMIN — METOPROLOL TARTRATE 12.5 MG: 25 TABLET, FILM COATED ORAL at 09:12

## 2024-12-04 RX ADMIN — LACTULOSE 30 G: 20 SOLUTION ORAL at 09:12

## 2024-12-04 RX ADMIN — LACTULOSE 30 G: 20 SOLUTION ORAL at 03:12

## 2024-12-04 RX ADMIN — NOREPINEPHRINE BITARTRATE 0.08 MCG/KG/MIN: 16 SOLUTION INTRAVENOUS at 01:12

## 2024-12-04 RX ADMIN — SODIUM CHLORIDE: 9 INJECTION, SOLUTION INTRAVENOUS at 05:12

## 2024-12-04 RX ADMIN — NOREPINEPHRINE BITARTRATE 0.12 MCG/KG/MIN: 16 SOLUTION INTRAVENOUS at 06:12

## 2024-12-04 NOTE — PROGRESS NOTES
"[facility] - [facility]  Adult Nutrition  Progress Note    SUMMARY   Recommendations    1.) Continue Renal diet - per MD 12/4   -Nursing, continue documenting PO intake via flowsheets    2.) RD added Boost Breeze TID to assist in meeting nutrition needs (provides 750 kcals, 27 g PRO, 162 CHO)               - once diet advances, may change to Boost Plus TID.    3.) RD to monitor wt, PO intake, skin, labs.     Goals: to meet % of EEN/EPN by next RD f/u  Nutrition Goal Status: goal not met  Communication of RD Recs: reviewed with physician (secure chat to MD)    Assessment and Plan    Nutrition Problem  Increased nutrient needs (protein)     Related to (etiology):   Increased demand for protein 2/2 acute renal failure     Signs and Symptoms (as evidenced by):   Need for CRRT      Interventions/Recommendations (treatment strategy):  Collaboration of nutritional care with other providers.   ONS     Nutrition Diagnosis Status:   Active    Reason for Assessment    Reason For Assessment: RD follow-up  Diagnosis: liver disease (Decompensated cirrhosis)  General Information Comments: RD f/u, continues on CLD today, now advanced to Renal. No intake recorded via RN flowsheets. SCUF continues. Palliative care consulted today (see note). 3# wt loss noted since last nutrition assessment (5 days ago). Current diet insufficient for needs. Will monitor  Nutrition Discharge Planning: Regular Diet, fluid per MD    Nutrition Related Social Determinants of Health: SDOH: None Identified      Nutrition Risk Screen    Nutrition Risk Screen: reduced oral intake over the last month    Nutrition/Diet History    Patient Reported Diet/Restrictions/Preferences: general  Typical Food/Fluid Intake: 2 meals/day + snacks  Spiritual, Cultural Beliefs, Gnosticist Practices, Values that Affect Care: no  Food Allergies: NKFA    Anthropometrics    Temp: 98.4 °F (36.9 °C)  Height Method: Stated  Height: 6' 1" (185.4 cm)  Height (inches): 73 in  Weight " Method: Bed Scale  Weight: (!) 149 kg (328 lb 7.8 oz)  Weight (lb): (!) 328.49 lb  Ideal Body Weight (IBW), Male: 184 lb  % Ideal Body Weight, Male (lb): 183.32 %  BMI (Calculated): 43.3       Lab/Procedures/Meds    Pertinent Labs Reviewed: reviewed  Pertinent Labs Comments: Na: 133, creatinine: 2.2, GFR: 31.2, GLU: 112, ALT: 8  Pertinent Medications Reviewed: reviewed  Pertinent Medications Comments: Lactulose, lopressor, pantoprazole, tamsulosin      Estimated/Assessed Needs    Weight Used For Calorie Calculations: (!) 150.2 kg (331 lb 2.1 oz)  Energy Calorie Requirements (kcal): 2311 kcal  Energy Need Method: [facility]  Protein Requirements: 167- 184 (2.0-2.2g/kg of IBW)  Weight Used For Protein Calculations: 83.5 kg (184 lb) (IBW d/t BMI >40.0)  Fluid Requirements (mL): as per MD     RDA Method (mL): 2311         Nutrition Prescription Ordered    Current Diet Order: Renal diet    Evaluation of Received Nutrient/Fluid Intake    I/O: +4.9 L since admit  Energy Calories Required: not meeting needs  Protein Required: not meeting needs  Fluid Required:  (as per MD)  Comments: LBM 12/3 (loose)  Tolerance: tolerating  % Intake of Estimated Energy Needs: 0 - 25 %  % Meal Intake: Other: n/a     Nutrition Risk    Level of Risk/Frequency of Follow-up: low - moderate     Monitor and Evaluation    Food and Nutrient Intake: food and beverage intake  Food and Nutrient Adminstration: diet order  Knowledge/Beliefs/Attitudes: food and nutrition knowledge/skill, beliefs and attitudes  Physical Activity and Function: nutrition-related ADLs and IADLs  Anthropometric Measurements: weight, weight change, body mass index  Biochemical Data, Medical Tests and Procedures: electrolyte and renal panel, gastrointestinal profile, glucose/endocrine profile, lipid profile, inflammatory profile  Nutrition-Focused Physical Findings: overall appearance, skin     Nutrition Follow-Up    RD Follow-up?: Yes    Kenyetta Valdes RD, LDN

## 2024-12-04 NOTE — PROGRESS NOTES
12/04/24 0826   Treatment   Treatment Status Discontinued treatment;Blood returned   Dialyzer Time (hours) 12.11   BVP (Liters) 41.7 L   CRRT Sodium Chloride   CRRT Sodium Chloride Volume 286.8 mL   CRRT Hourly Documentation   Blood Flow (mL/min) 200   UF Rate 400 cc/hr   Arterial Pressure (mmHg) -40 mmHg   Venous Pressure (mmHg) 190 mmHg   Effluent Pressure (EP) (mmHg) 20 mmHg   Total UF (Hourly Cleared) (mL) 467     SLED completed. Blood returned by primary RN. Machine disinfected at this time.

## 2024-12-04 NOTE — PLAN OF CARE
MICU DAILY GOALS     Family/Goals of care/Code Status   Code Status: Full Code    24H Vital Sign Range  Temp:  [96.8 °F (36 °C)-98.2 °F (36.8 °C)]   Pulse:  []   Resp:  [12-28]   BP: (120-139)/(42-44)   SpO2:  [93 %-98 %]   Arterial Line BP: (107-139)/(37-58)      Shift Events (include procedures and significant events)   No acute events throughout shift    AWAKE RASS: Goal - RASS Goal: 0-->alert and calm  Actual - RASS (Pedraza Agitation-Sedation Scale): alert and calm    Restraint necessity: Not necessary   BREATHE SBT: Not intubated    Coordinate A & B, analgesics/sedatives Pain: managed   SAT: Not intubated   Delirium CAM-ICU: Overall CAM-ICU: Negative   Early(intubated/ Progressive (non-intubated) Mobility MOVE Screen (INTUBATED ONLY): Not intubated    Activity: Activity Management: Ankle pumps - L1, Arm raise - L1   Feeding/Nutrition Diet order: Diet/Nutrition Received: clear liquid, Specialty Diet/Nutrition Received: renal diet   Thrombus DVT prophylaxis: VTE Core Measure: (SCDs) Sequential compression device initiated/maintained   HOB Elevation Head of Bed (HOB) Positioning: HOB at 30-45 degrees   Ulcer Prophylaxis GI: yes   Glucose control managed Glycemic Management: blood glucose monitored   Skin Skin assessment:     Sacrum intact/not altered? Yes  Heels intact/not altered? Yes  Surgical wound? No    CHECK ONE!   (no altered skin or altered skin) and sub boxes:  [] No Altered Skin Integrity Present    []Prevention Measures Documented    [x] Altered Skin Integrity Present or Discovered   [x] LDA present in EPIC, daily doc completed              [] LDA added if not in EPIC (describe wound).                    When describing wound, do not stage, use descriptive words only.    [] Wound Image Taken (required on admit,                   transfer/discharge and every Tuesday)    Wound Care Consulted? Yes   Bowel Function diarrhea    Indwelling Catheter Necessity [REMOVED]      Urethral Catheter 11/26/24  1738-Reason for Continuing Urinary Catheterization: Urinary retention       Urethral Catheter 12/03/24 1607 Silicone 16 Fr.-Reason for Continuing Urinary Catheterization: Urinary retention, Critically ill in ICU and requiring hourly monitoring of intake/output  [REMOVED]      Urethral Catheter 11/20/24 1802 Double-lumen-Reason for Continuing Urinary Catheterization: Urinary retention    Trialysis (Dialysis) Catheter 11/24/24 0441 right internal jugular-Line Necessity Review: CRRT/HD     De-escalation Antibiotics Yes        VS and assessment per flow sheet, patient progressing towards goals as tolerated, plan of care reviewed with family, all concerns addressed, will continue to monitor.

## 2024-12-04 NOTE — ASSESSMENT & PLAN NOTE
Impression: Pt is a 70 y/o male with PMH of alcoholic cirrhosis, esophageal varices, Afib on eliquis, and HTN who presented for c/o worsening diffuse swelling as well as R arm pain. Pt has ARF. Pt has Decompensated cirrhosis Pt is a full code.     Reason for consult: GOC/planning for difficult conversations.     Goals of care/ACP:     Met with pt who is aware of his medical issues. Pt is aware he is not a candidate for liver transplant at this time. Per pt he stopped drinking 6 months ago. Per pt he is aware he has damaged himself from drinking so much but is determined to remain sober. Pt stated he is aware that even with stopping to drink he may continue to decline.   Per pt his goal at this time is to continue medical treatment. Per pt he wants to continue to f/u with Dr. Cota in Hepatology clinic.   Pt reports prior to hospitalizations he was independent and lives alone.   One of pt's goal is to remain independent    Per pt his goal is to move in with his son. Pt open to SNF placement if needed to get stronger. Pt working with PT. See notes.     Pt reports feeling fatigued today/no pain.     MPOA: pt report his son, Juan Carlos Yoo Jr is MPOA. He reported he did paperwork.     Symptom management:     Debility:   Would have PT/OT see and evaluate pt.     JAE:  Managed per nephrology     Plan per nephrology  -- Stress dosing with Fludrocortisone and Hydrocortisone as HRS patients tend to have AI-- 11/27 d/c steroids  --appreciate nephrology recommendations continuing dialysis.   - 11/29 MAP goal > 60-65    Pt reports no pain.     Plan:   Will continue to follow.   Will f/u outpt/virtual visit. Pt lives in [place].

## 2024-12-04 NOTE — SUBJECTIVE & OBJECTIVE
Interval History/Significant Events: NAEON. Patient reports feeling well, has no new concerns at this time.     Denies HA, fevers, chills, chest pain, palpitations, SOB, N/V,     Review of Systems  Objective:     Vital Signs (Most Recent):  Temp: 97.6 °F (36.4 °C) (12/04/24 0715)  Pulse: 91 (12/04/24 1100)  Resp: 15 (12/04/24 1100)  BP: (!) 120/40 (12/04/24 0952)  SpO2: 97 % (12/04/24 1100) Vital Signs (24h Range):  Temp:  [96.8 °F (36 °C)-98.2 °F (36.8 °C)] 97.6 °F (36.4 °C)  Pulse:  [80-94] 91  Resp:  [12-21] 15  SpO2:  [95 %-98 %] 97 %  BP: (120)/(40-42) 120/40  Arterial Line BP: (107-130)/(36-49) 126/37   Weight: (!) 149 kg (328 lb 7.8 oz)  Body mass index is 43.34 kg/m².      Intake/Output Summary (Last 24 hours) at 12/4/2024 1221  Last data filed at 12/4/2024 0826  Gross per 24 hour   Intake 3627.01 ml   Output 4155 ml   Net -527.99 ml          Physical Exam  Vitals and nursing note reviewed.   Constitutional:       General: He is awake. He is not in acute distress.     Appearance: He is obese. He is ill-appearing. He is not toxic-appearing.   HENT:      Head: Normocephalic and atraumatic.   Eyes:      General: No scleral icterus.     Extraocular Movements: Extraocular movements intact.      Conjunctiva/sclera: Conjunctivae normal.   Cardiovascular:      Rate and Rhythm: Normal rate. Rhythm irregular.   Pulmonary:      Effort: Pulmonary effort is normal. No respiratory distress.   Abdominal:      General: There is distension.      Palpations: Abdomen is soft.      Tenderness: There is no abdominal tenderness. There is no guarding or rebound.   Musculoskeletal:         General: No swelling or tenderness.      Right lower leg: Edema present.      Left lower leg: Edema present.      Comments: 2+ pitting edema in bilateral lower extremities   Skin:     General: Skin is warm.      Coloration: Skin is not jaundiced.      Findings: Bruising present.   Neurological:      Mental Status: He is alert and oriented to  person, place, and time.      Motor: No weakness.            Vents:     Lines/Drains/Airways       Central Venous Catheter Line  Duration             Trialysis (Dialysis) Catheter 11/24/24 0441 right internal jugular 10 days              Drain  Duration                  Urethral Catheter 12/03/24 1607 Silicone 16 Fr. <1 day              Arterial Line  Duration             Arterial Line 11/24/24 0317 Right Radial 10 days              Peripheral Intravenous Line  Duration                  Peripheral IV - Single Lumen 11/27/24 1101 20 G Anterior;Left Forearm 7 days                  Significant Labs:    CBC/Anemia Profile:  Recent Labs   Lab 12/03/24  0320 12/04/24 0222   WBC 8.32 8.86   HGB 7.6* 7.5*   HCT 23.6* 23.9*   PLT 45* 44*   MCV 77* 78*   RDW 23.9* 23.9*        Chemistries:  Recent Labs   Lab 12/03/24  0907 12/03/24  1328 12/03/24  2254 12/04/24 0222 12/04/24  0952   NA  --  134* 129*  129* 133*  --    K  --  3.8 3.5  3.5 3.7  --    CL  --  107 103  103 107  --    CO2  --  19* 18*  18* 19*  --    BUN  --  10 11  11 11  --    CREATININE  --  2.1* 2.1*  2.1* 2.2*  --    CALCIUM  --  7.8* 7.9*  7.9* 7.8*  --    ALBUMIN 2.5* 2.6* 2.4*  2.4* 2.5* 2.5*   PROT 5.3*  --   --  5.4* 5.5*   BILITOT 3.8*  --   --  3.5* 3.7*   ALKPHOS 42  --   --  47 48   ALT 7*  --   --  8* 8*   AST 14  --   --  17 16   MG  --  1.8 1.8  1.8 1.8 1.8   PHOS  --  3.5  3.5 3.7  3.7 3.5  --        All pertinent labs within the past 24 hours have been reviewed.    Significant Imaging:  I have reviewed all pertinent imaging results/findings within the past 24 hours.

## 2024-12-04 NOTE — SUBJECTIVE & OBJECTIVE
Interval History: Pt has alcoholic cirrhosis/pt on SLED. Pt on NE.     Past Medical History:   Diagnosis Date    Atrial fibrillation     Bulging disc     Cirrhosis     Colon polyp 12/29/2017    Rpt 5 yrs    EV (esophageal varices)     Hypertension 3/30/2023    Skin cancer 03/2017    Thrombocytopenia        Past Surgical History:   Procedure Laterality Date    CATHETERIZATION OF BOTH LEFT AND RIGHT HEART N/A 2/8/2023    Procedure: CATHETERIZATION, HEART, BOTH LEFT AND RIGHT;  Surgeon: Flo Malone MD;  Location: [facility] CATH LAB;  Service: Cardiology;  Laterality: N/A;  low bleeding risk 1.0%    CHOLECYSTECTOMY      COLONOSCOPY      COLONOSCOPY N/A 12/29/2017    ta rpt 2022    CORONARY ANGIOGRAPHY N/A 2/8/2023    Procedure: ANGIOGRAM, CORONARY ARTERY;  Surgeon: Flo Malone MD;  Location: [facility] CATH LAB;  Service: Cardiology;  Laterality: N/A;    HERNIA REPAIR      SKIN BIOPSY  03/2017    TONSILLECTOMY      UPPER GASTROINTESTINAL ENDOSCOPY  2011    EV    UPPER GASTROINTESTINAL ENDOSCOPY  2016    VASECTOMY         Review of patient's allergies indicates:  No Known Allergies    Medications:  Continuous Infusions:   sodium chloride 0.9%   Intravenous Continuous 200 mL/hr at 12/04/24 0826 Rate Verify at 12/04/24 0826    NORepinephrine bitartrate-D5W  0-3 mcg/kg/min Intravenous Continuous 66.6 mL/hr at 12/04/24 0630 0.12 mcg/kg/min at 12/04/24 0630     Scheduled Meds:   lactulose  30 g Oral TID    meropenem IV (PEDS and ADULTS)  1 g Intravenous Q12H    metoprolol tartrate  12.5 mg Oral BID    midodrine  20 mg Oral Q8H    pantoprazole  40 mg Oral Daily    tamsulosin  1 capsule Oral QHS     PRN Meds:  Current Facility-Administered Medications:     albuterol-ipratropium, 3 mL, Nebulization, Q6H PRN    aluminum-magnesium hydroxide-simethicone, 30 mL, Oral, QID PRN    dextrose 10%, 12.5 g, Intravenous, PRN    dextrose 10%, 25 g, Intravenous, PRN    glucagon (human recombinant), 1 mg, Intramuscular, PRN    glucose, 16 g,  Oral, PRN    glucose, 24 g, Oral, PRN    magnesium sulfate IVPB, 2 g, Intravenous, PRN    melatonin, 6 mg, Oral, Nightly PRN    naloxone, 0.02 mg, Intravenous, PRN    ondansetron, 4 mg, Intravenous, Q8H PRN    simethicone, 1 tablet, Oral, QID PRN    sodium chloride 0.9%, 10 mL, Intravenous, Q12H PRN    sodium chloride 0.9%, 10 mL, Intravenous, PRN    sodium phosphate 20.01 mmol in D5W 250 mL IVPB, 20.01 mmol, Intravenous, PRN    sodium phosphate 30 mmol in D5W 250 mL IVPB, 30 mmol, Intravenous, PRN    sodium phosphate 39.99 mmol in D5W 250 mL IVPB, 39.99 mmol, Intravenous, PRN    Family History       Problem Relation (Age of Onset)    Cancer Maternal Uncle    Heart disease Maternal Uncle    Hyperlipidemia Brother    Ovarian cancer Sister          Tobacco Use    Smoking status: Never    Smokeless tobacco: Never   Substance and Sexual Activity    Alcohol use: Not Currently     Alcohol/week: 0.0 standard drinks of alcohol    Drug use: No    Sexual activity: Not on file       Review of Systems   Constitutional:  Positive for activity change.   Respiratory:  Negative for shortness of breath.    Cardiovascular:  Positive for leg swelling.   Gastrointestinal:  Positive for constipation.   Neurological:  Positive for weakness.     Objective:     Vital Signs (Most Recent):  Temp: 96.8 °F (36 °C) (12/04/24 0301)  Pulse: 89 (12/04/24 0952)  Resp: 17 (12/04/24 0816)  BP: (!) 120/40 (12/04/24 0952)  SpO2: 97 % (12/04/24 0816) Vital Signs (24h Range):  Temp:  [96.8 °F (36 °C)-98.2 °F (36.8 °C)] 96.8 °F (36 °C)  Pulse:  [80-94] 89  Resp:  [12-21] 17  SpO2:  [95 %-98 %] 97 %  BP: (120)/(40-42) 120/40  Arterial Line BP: (107-132)/(38-50) 119/42     Weight: (!) 149 kg (328 lb 7.8 oz)  Body mass index is 43.34 kg/m².       Physical Exam  Constitutional:       Appearance: He is overweight.   HENT:      Head: Normocephalic and atraumatic.   Pulmonary:      Effort: Pulmonary effort is normal.   Musculoskeletal:      Comments: Edema noted  to extremities.    Skin:     General: Skin is warm and dry.   Neurological:      Mental Status: He is alert and oriented to person, place, and time.   Psychiatric:         Behavior: Behavior is cooperative.            Review of Symptoms      Symptom Assessment (ESAS 0-10 Scale)  Pain:  0  Dyspnea:  0  Anxiety:  0  Nausea:  0  Depression:  0  Anorexia:  0  Fatigue:  0  Insomnia:  0  Restlessness:  0  Agitation:  0       Constipation:  Constipation    Psychosocial/Cultural:   See Palliative Psychosocial Note: Yes  **Primary  to Follow**  Palliative Care  Consult: Yes        Advance Care Planning   Advance Directives:   Living Will: No    LaPOST: No    Do Not Resuscitate Status: No    Medical Power of : No    Agent's Name:  Pt reports he has boni TREVINO and his son is URIEL.    Decision Making:  Patient answered questions  Goals of Care: The patient endorses that what is most important right now is to focus on remaining as independent as possible    Accordingly, we have decided that the best plan to meet the patient's goals includes continuing with treatment         Significant Labs: All pertinent labs within the past 24 hours have been reviewed.  CBC:   Recent Labs   Lab 12/04/24 0222   WBC 8.86   HGB 7.5*   HCT 23.9*   MCV 78*   PLT 44*     BMP:  Recent Labs   Lab 12/04/24 0222   *   *   K 3.7      CO2 19*   BUN 11   CREATININE 2.2*   CALCIUM 7.8*   MG 1.8     LFT:  Lab Results   Component Value Date    AST 17 12/04/2024    GGT 30 12/13/2022    ALKPHOS 47 12/04/2024    BILITOT 3.5 (H) 12/04/2024     Albumin:   Albumin   Date Value Ref Range Status   12/04/2024 2.5 (L) 3.5 - 5.2 g/dL Final     Protein:   Total Protein   Date Value Ref Range Status   12/04/2024 5.4 (L) 6.0 - 8.4 g/dL Final     Lactic acid:   Lab Results   Component Value Date    LACTATE 2.7 (H) 11/28/2024    LACTATE 2.9 (H) 11/28/2024       Significant Imaging: I have reviewed all pertinent  imaging results/findings within the past 24 hours.

## 2024-12-04 NOTE — PROGRESS NOTES
[facility] - Medical ICU  Nephrology  Progress Note    Patient Name: Juan Carlos Yoo Sr.  MRN: [id number]  Admission Date: 11/20/2024  Hospital Length of Stay: 14 days  Attending Provider: Evelyn Amso MD   Primary Care Physician: Nyla Rios FNP  Principal Problem:Decompensated cirrhosis    Subjective:     HPI: Juan Carlos Yoo is a 69 year old male with hx of decompensated hepatic cirrhosis due to alcohol use, HCC s/p Y90, esophageal varices, persistent afib on eliquis, HTN, CKD3a (baseline creatinine 1.2-1.4) admitted on 11/20 for sepsis likely 2/2 SSTI involving RUE and decompensated hepatic cirrhosis with signs of volume overload. Recent admission 10/4 at [facility] for decompensated hepatic cirrhosis where he was discharged with oral diuretic regimen including furosemide 60 mg BID and metolazone 2.5 mg daily, low salt diet with fluid restriction. It is unclear if patient is adherent to these medications or lifestyle modifications. W/u notable for anemia with hb 6.7 s/p 1 unit pRBC 11/20 and JAE on CKD w elevated BUN 49/creatinine 5.9, hyperkalemia (K 6.1>5.1 after shifting), bicarb 18, elevated lactate up to 3.5. Nephrology consulted for JAE with c/o HRS    Interval History:   Status post CRRT, 8 hours SCUF and 4 hours SLED, ultrafiltration 350-400 cc per hour, on pressors, urine output 530 mL, CRRT orders placed for nocturnal Zack/sled      Review of patient's allergies indicates:  No Known Allergies  Current Facility-Administered Medications   Medication Frequency    0.9%  NaCl infusion (CRRT USE ONLY) Continuous    0.9%  NaCl infusion (CRRT USE ONLY) Continuous    albuterol-ipratropium 2.5 mg-0.5 mg/3 mL nebulizer solution 3 mL Q6H PRN    aluminum-magnesium hydroxide-simethicone 200-200-20 mg/5 mL suspension 30 mL QID PRN    dextrose 10% bolus 125 mL 125 mL PRN    dextrose 10% bolus 250 mL 250 mL PRN    glucagon (human recombinant) injection 1 mg PRN    glucose chewable tablet 16 g PRN    glucose chewable tablet  24 g PRN    lactulose 20 gram/30 mL solution Soln 30 g TID    magnesium sulfate 2g in water 50mL IVPB (premix) PRN    magnesium sulfate 2g in water 50mL IVPB (premix) PRN    melatonin tablet 6 mg Nightly PRN    meropenem injection 1 g Q12H    metoprolol tartrate (LOPRESSOR) split tablet 12.5 mg BID    midodrine tablet 20 mg Q8H    naloxone 0.4 mg/mL injection 0.02 mg PRN    NORepinephrine 4 mg in sodium chloride 0.9% 250 mL infusion (premix) Continuous    ondansetron injection 4 mg Q8H PRN    pantoprazole EC tablet 40 mg Daily    simethicone chewable tablet 80 mg QID PRN    sodium chloride 0.9% flush 10 mL Q12H PRN    sodium chloride 0.9% flush 10 mL PRN    sodium phosphate 20.01 mmol in D5W 250 mL IVPB PRN    sodium phosphate 20.01 mmol in D5W 250 mL IVPB PRN    sodium phosphate 30 mmol in D5W 250 mL IVPB PRN    sodium phosphate 30 mmol in D5W 250 mL IVPB PRN    sodium phosphate 39.99 mmol in D5W 250 mL IVPB PRN    sodium phosphate 39.99 mmol in D5W 250 mL IVPB PRN    tamsulosin 24 hr capsule 0.4 mg QHS       Objective:     Vital Signs (Most Recent):  Temp: 98.4 °F (36.9 °C) (12/04/24 1105)  Pulse: 91 (12/04/24 1200)  Resp: 16 (12/04/24 1200)  BP: (!) 120/40 (12/04/24 0952)  SpO2: 96 % (12/04/24 1200) Vital Signs (24h Range):  Temp:  [96.8 °F (36 °C)-98.4 °F (36.9 °C)] 98.4 °F (36.9 °C)  Pulse:  [80-94] 91  Resp:  [12-21] 16  SpO2:  [95 %-98 %] 96 %  BP: (120)/(40-42) 120/40  Arterial Line BP: (107-128)/(35-46) 112/38     Weight: (!) 149 kg (328 lb 7.8 oz) (12/02/24 0400)  Body mass index is 43.34 kg/m².  Body surface area is 2.77 meters squared.    I/O last 3 completed shifts:  In: 6624.7 [P.O.:360; I.V.:5835.6; IV Piggyback:429.1]  Out: 8505 [Urine:530; Other:7975]     Physical Exam  Constitutional:       General: He is not in acute distress.     Appearance: He is ill-appearing.   HENT:      Head: Normocephalic and atraumatic.   Eyes:      Extraocular Movements: Extraocular movements intact.      Pupils: Pupils  are equal, round, and reactive to light.   Cardiovascular:      Rate and Rhythm: Normal rate.   Pulmonary:      Effort: No respiratory distress.      Breath sounds: No wheezing or rales.   Abdominal:      General: There is distension.      Tenderness: There is no abdominal tenderness. There is no guarding.   Musculoskeletal:      Right lower leg: Edema present.      Left lower leg: Edema present.   Skin:     Coloration: Skin is pale. Skin is not jaundiced.   Neurological:      Mental Status: He is oriented to person, place, and time.   Psychiatric:         Behavior: Behavior normal.          Significant Labs:  CBC:   Recent Labs   Lab 12/04/24  0222   WBC 8.86   RBC 3.06*   HGB 7.5*   HCT 23.9*   PLT 44*   MCV 78*   MCH 24.5*   MCHC 31.4*     CMP:   Recent Labs   Lab 12/04/24  0952 12/04/24  1435   GLU  --  125*   CALCIUM  --  7.7*   ALBUMIN 2.5* 2.4*   PROT 5.5*  --    NA  --  133*   K  --  3.6   CO2  --  19*   CL  --  108   BUN  --  8   CREATININE  --  1.9*   ALKPHOS 48  --    ALT 8*  --    AST 16  --    BILITOT 3.7*  --      LFTs:   Recent Labs   Lab 12/04/24  0952 12/04/24  1435   ALT 8*  --    AST 16  --    ALKPHOS 48  --    BILITOT 3.7*  --    PROT 5.5*  --    ALBUMIN 2.5* 2.4*       Assessment/Plan:     Renal/  JAE (acute kidney injury)  JAE is likely due to pre-renal azotemia due to intravascular volume depletion secondary to decompensated hepatic cirrhosis with volume overload and acute tubular necrosis caused by hemodynamic instability, renal hypoperfusion, severe sepsis with GNR bacteremia. Initial presentation concerning for hepatorenal syndrome, transferred to MICU for vasopressors without success in reaching MAP goal 85-90 for treatment of HRS. Currently, patient with oligouric JAE more contributed by ATN as above.     Recommendations:    -Plan for 8 hours SCUF followed by 4 hours SLED tonight for removal of uremic toxins and volume control -400cc/hr goal is to remove 1-2 L  -Recommend goals of  care discussion regarding current prognosis and liver transplant candidacy   -Avoid nephrotoxins and renally dose meds for GFR listed above  -Monitor urine output, serial BMP, and adjust therapy as needed  -Hemodialysis consent obtained & placed in patient chart        Thank you for your consult. I will follow-up with patient. Please contact us if you have any additional questions.    Irma Hernandez MD  Nephrology  [facility] - [facility]    ATTENDING PHYSICIAN ATTESTATION  I have personally verified the history and examined the patient. I thoroughly reviewed the demographic, clinical, laboratorial and imaging information available in medical records. I agree with the assessment and recommendations provided by the subspecialty resident who was under my supervision.

## 2024-12-04 NOTE — SUBJECTIVE & OBJECTIVE
Interval History:   Status post CRRT, 8 hours SCUF and 4 hours SLED, ultrafiltration 350-400 cc per hour, on pressors, urine output 530 mL, CRRT orders placed for nocturnal Zack/sled      Review of patient's allergies indicates:  No Known Allergies  Current Facility-Administered Medications   Medication Frequency    0.9%  NaCl infusion (CRRT USE ONLY) Continuous    0.9%  NaCl infusion (CRRT USE ONLY) Continuous    albuterol-ipratropium 2.5 mg-0.5 mg/3 mL nebulizer solution 3 mL Q6H PRN    aluminum-magnesium hydroxide-simethicone 200-200-20 mg/5 mL suspension 30 mL QID PRN    dextrose 10% bolus 125 mL 125 mL PRN    dextrose 10% bolus 250 mL 250 mL PRN    glucagon (human recombinant) injection 1 mg PRN    glucose chewable tablet 16 g PRN    glucose chewable tablet 24 g PRN    lactulose 20 gram/30 mL solution Soln 30 g TID    magnesium sulfate 2g in water 50mL IVPB (premix) PRN    magnesium sulfate 2g in water 50mL IVPB (premix) PRN    melatonin tablet 6 mg Nightly PRN    meropenem injection 1 g Q12H    metoprolol tartrate (LOPRESSOR) split tablet 12.5 mg BID    midodrine tablet 20 mg Q8H    naloxone 0.4 mg/mL injection 0.02 mg PRN    NORepinephrine 4 mg in sodium chloride 0.9% 250 mL infusion (premix) Continuous    ondansetron injection 4 mg Q8H PRN    pantoprazole EC tablet 40 mg Daily    simethicone chewable tablet 80 mg QID PRN    sodium chloride 0.9% flush 10 mL Q12H PRN    sodium chloride 0.9% flush 10 mL PRN    sodium phosphate 20.01 mmol in D5W 250 mL IVPB PRN    sodium phosphate 20.01 mmol in D5W 250 mL IVPB PRN    sodium phosphate 30 mmol in D5W 250 mL IVPB PRN    sodium phosphate 30 mmol in D5W 250 mL IVPB PRN    sodium phosphate 39.99 mmol in D5W 250 mL IVPB PRN    sodium phosphate 39.99 mmol in D5W 250 mL IVPB PRN    tamsulosin 24 hr capsule 0.4 mg QHS       Objective:     Vital Signs (Most Recent):  Temp: 98.4 °F (36.9 °C) (12/04/24 1105)  Pulse: 91 (12/04/24 1200)  Resp: 16 (12/04/24 1200)  BP: (!)  120/40 (12/04/24 0952)  SpO2: 96 % (12/04/24 1200) Vital Signs (24h Range):  Temp:  [96.8 °F (36 °C)-98.4 °F (36.9 °C)] 98.4 °F (36.9 °C)  Pulse:  [80-94] 91  Resp:  [12-21] 16  SpO2:  [95 %-98 %] 96 %  BP: (120)/(40-42) 120/40  Arterial Line BP: (107-128)/(35-46) 112/38     Weight: (!) 149 kg (328 lb 7.8 oz) (12/02/24 0400)  Body mass index is 43.34 kg/m².  Body surface area is 2.77 meters squared.    I/O last 3 completed shifts:  In: 6624.7 [P.O.:360; I.V.:5835.6; IV Piggyback:429.1]  Out: 8505 [Urine:530; Other:7975]     Physical Exam  Constitutional:       General: He is not in acute distress.     Appearance: He is ill-appearing.   HENT:      Head: Normocephalic and atraumatic.   Eyes:      Extraocular Movements: Extraocular movements intact.      Pupils: Pupils are equal, round, and reactive to light.   Cardiovascular:      Rate and Rhythm: Normal rate.   Pulmonary:      Effort: No respiratory distress.      Breath sounds: No wheezing or rales.   Abdominal:      General: There is distension.      Tenderness: There is no abdominal tenderness. There is no guarding.   Musculoskeletal:      Right lower leg: Edema present.      Left lower leg: Edema present.   Skin:     Coloration: Skin is pale. Skin is not jaundiced.   Neurological:      Mental Status: He is oriented to person, place, and time.   Psychiatric:         Behavior: Behavior normal.          Significant Labs:  CBC:   Recent Labs   Lab 12/04/24  0222   WBC 8.86   RBC 3.06*   HGB 7.5*   HCT 23.9*   PLT 44*   MCV 78*   MCH 24.5*   MCHC 31.4*     CMP:   Recent Labs   Lab 12/04/24  0952 12/04/24  1435   GLU  --  125*   CALCIUM  --  7.7*   ALBUMIN 2.5* 2.4*   PROT 5.5*  --    NA  --  133*   K  --  3.6   CO2  --  19*   CL  --  108   BUN  --  8   CREATININE  --  1.9*   ALKPHOS 48  --    ALT 8*  --    AST 16  --    BILITOT 3.7*  --      LFTs:   Recent Labs   Lab 12/04/24  0952 12/04/24  1435   ALT 8*  --    AST 16  --    ALKPHOS 48  --    BILITOT 3.7*  --    PROT  5.5*  --    ALBUMIN 2.5* 2.4*

## 2024-12-04 NOTE — PROGRESS NOTES
12/03/24 2015   Treatment   Treatment Type SCUF   Treatment Status Restart   Dialysis Machine Number [id number]   Dialyzer Time (hours) 0   BVP (Liters) 0 L   Solutions Labeled and Current  Yes   Access Temporary Cath   Catheter Dressing Intact  Yes   Alarms Engaged Yes   CRRT Comments CRRT RST   $ CRRT Charges   $ CRRT Charges Restart     Report received from primary RN. CRRT restarted per MD order.

## 2024-12-04 NOTE — PLAN OF CARE
Recommendations    1.) Continue Renal diet - per MD 12/4   -Nursing, continue documenting PO intake via flowsheets    2.) RD added Boost Breeze TID to assist in meeting nutrition needs (provides 750 kcals, 27 g PRO, 162 CHO)               - once diet advances, may change to Boost Plus TID.    3.) RD to monitor wt, PO intake, skin, labs.     Goals: to meet % of EEN/EPN by next RD f/u  Nutrition Goal Status: goal not met  Communication of RD Recs: reviewed with physician (secure chat to MD)

## 2024-12-04 NOTE — ASSESSMENT & PLAN NOTE
This patient does have evidence of infective focus  My overall impression is sepsis.  Source: Abdominal  Antibiotics given-   Antibiotics (72h ago, onward)      Start     Stop Route Frequency Ordered    12/02/24 1800  meropenem injection 1 g         -- IV Every 12 hours (non-standard times) 12/02/24 1517          Organ dysfunction indicated by Acute kidney injury    Fluid challenge Contraindicated- Fluid bolus is contraindicated in this patient due to End Stage Liver Disease     Post- resuscitation assessment No - Post resuscitation assessment not needed     Source control achieved by: antibiotics  12/8 should be final day for abx

## 2024-12-04 NOTE — PROGRESS NOTES
[facility] - Medical ICU  Palliative Medicine  Progress Note    Patient Name: Juan Carlos Yoo Sr.  MRN: [id number]  Admission Date: 11/20/2024  Hospital Length of Stay: 14 days  Code Status: Full Code   Attending Provider: Evelyn Amos MD  Consulting Provider: ROE Alcaraz  Primary Care Physician: Nyla Rios FNP  Principal Problem:Decompensated cirrhosis    Patient information was obtained from patient and primary team.      Assessment/Plan:     Palliative Care  Palliative care encounter  Impression: Pt is a 68 y/o male with PMH of alcoholic cirrhosis, esophageal varices, Afib on eliquis, and HTN who presented for c/o worsening diffuse swelling as well as R arm pain. Pt has ARF. Pt has Decompensated cirrhosis Pt is a full code.     Reason for consult: GOC/planning for difficult conversations.     Goals of care/ACP:     Met with pt who is aware of his medical issues. Pt is aware he is not a candidate for liver transplant at this time. Per pt he stopped drinking 6 months ago. Per pt he is aware he has damaged himself from drinking so much but is determined to remain sober. Pt stated he is aware that even with stopping to drink he may continue to decline.   Per pt his goal at this time is to continue medical treatment. Per pt he wants to continue to f/u with Dr. Cota in Hepatology clinic.   Pt reports prior to hospitalizations he was independent and lives alone.   One of pt's goal is to remain independent    Per pt his goal is to move in with his son. Pt open to SNF placement if needed to get stronger. Pt working with PT. See notes.     Pt reports feeling fatigued today/no pain.     MPOA: pt report his son, Juan Carlos Yoo Jr is MPOA. He reported he did paperwork.     Symptom management:     Debility:   Would have PT/OT see and evaluate pt.     JAE:  Managed per nephrology     Plan per nephrology  -- Stress dosing with Fludrocortisone and Hydrocortisone as HRS patients tend to have AI-- 11/27 d/c  "steroids  --appreciate nephrology recommendations continuing dialysis.   - 11/29 MAP goal > 60-65    Pt reports no pain.     Plan:   Will continue to follow.   Will f/u outpt/virtual visit. Pt lives in [place].             I will follow-up with patient. Please contact us if you have any additional questions.    Subjective:     Chief Complaint:   Chief Complaint   Patient presents with    Shortness of Breath     Has been seen at multiple hospitals recently "and wants his medication straightened out.  Making me shake"  endorses SOB.  Sweling noted to left hand with wrap in place to lower forearm       HPI:   Juan Carlos Yoo is a 69 male with PMH of alcoholic cirrhosis, esophageal varices, Afib on eliquis, and HTN who presents for c/o worsening diffuse swelling as well as R arm pain/erythema. Per chart review, pt was recently hospitalized 1 month ago at [facility] for volume overload in the setting of decompensated cirrhosis. He was treated with IV diuresis and discharged with adjustment to his diuretics, currently on lasix 60mg BID and metolazone 2.5mg every other week as per family. Patient reports diffuse swelling of his upper and lower extremities in addition to distended abdomen and worsening dyspnea, which he believes is due to recent medication adjustment. Family states he is non-compliant with low sodium diet and fluid restriction. Family states he has been compliant with diuretics, but rom't taken eliquis for 3 days due to issue getting refill. He also reports scratching right arm on door frame 3-4 days ago which has become red and painful after wrapping in in bandage. He claims to be compliant with medications, but is a poor historian. He follows with hepatology, not currently active on transplant list. He states he hasn't consumed alcohol for at least 9 months. Has c/o chills, but denies fever, cough, nausea, vomiting, chest pain, dysuria, hematuria, blood in stool. Workup in ED remarkable for VS: T 97.6 HR " 94 RR 22 BP 95/56 O2 sat 97% on RA. Hb 6.7, MCV 75, Plt 81, PT/INR 22.6/2.2, Na 128, K 6.1, BUN/Cr 49/5.7, Alb 2.8, AST/ALT 35/9, Ammonia 104, , Trop neg, LA 2.9, CXR: Cardiac size is enlarged similar to prior.  No large volume of pleural fluid noted although there is mild blunting of the right costophrenic angle which may relate to a small amount of pleural fluid.  Suspected right basilar opacity, to be correlated clinically for infection. RUE venous doppler: No thrombus in central veins of the right upper extremity. Patient received vanc/zosyn and lasix 80mg IVP in ED and will be admitted to hospital medicine service for further management.     Pt was admitted to hospital medicine. Blood cultures positive for Gram-positive cocci and Gram-negative rods. ID has been consulted. S/p 2 units PRBC for Hemoglobin dropped 6.8 on 11/21. Pt was on Eliquis for atrial fibrillation prior to admission which was held.  Hepatology following patient's clinical course, but are not evaluating him for transplant at this time. Diuretics were held because of concern for HRS.  Patient was started on albumin and midodrine per Nephrology recommendations for HRS.  Renal function continued to worsen and recommendation per Nephrology to transfer to ICU for maintaining goal map over 85 on Levophed.     Pt off pressor support and on dialysis.     Hospital Course:  No notes on file    Interval History: Pt has alcoholic cirrhosis/pt on SLED. Pt on NE.     Past Medical History:   Diagnosis Date    Atrial fibrillation     Bulging disc     Cirrhosis     Colon polyp 12/29/2017    Rpt 5 yrs    EV (esophageal varices)     Hypertension 3/30/2023    Skin cancer 03/2017    Thrombocytopenia        Past Surgical History:   Procedure Laterality Date    CATHETERIZATION OF BOTH LEFT AND RIGHT HEART N/A 2/8/2023    Procedure: CATHETERIZATION, HEART, BOTH LEFT AND RIGHT;  Surgeon: Flo Malone MD;  Location: [facility] CATH LAB;  Service: Cardiology;   Laterality: N/A;  low bleeding risk 1.0%    CHOLECYSTECTOMY      COLONOSCOPY      COLONOSCOPY N/A 12/29/2017    ta rpt 2022    CORONARY ANGIOGRAPHY N/A 2/8/2023    Procedure: ANGIOGRAM, CORONARY ARTERY;  Surgeon: Flo Malone MD;  Location: [facility] CATH LAB;  Service: Cardiology;  Laterality: N/A;    HERNIA REPAIR      SKIN BIOPSY  03/2017    TONSILLECTOMY      UPPER GASTROINTESTINAL ENDOSCOPY  2011    EV    UPPER GASTROINTESTINAL ENDOSCOPY  2016    VASECTOMY         Review of patient's allergies indicates:  No Known Allergies    Medications:  Continuous Infusions:   sodium chloride 0.9%   Intravenous Continuous 200 mL/hr at 12/04/24 0826 Rate Verify at 12/04/24 0826    NORepinephrine bitartrate-D5W  0-3 mcg/kg/min Intravenous Continuous 66.6 mL/hr at 12/04/24 0630 0.12 mcg/kg/min at 12/04/24 0630     Scheduled Meds:   lactulose  30 g Oral TID    meropenem IV (PEDS and ADULTS)  1 g Intravenous Q12H    metoprolol tartrate  12.5 mg Oral BID    midodrine  20 mg Oral Q8H    pantoprazole  40 mg Oral Daily    tamsulosin  1 capsule Oral QHS     PRN Meds:  Current Facility-Administered Medications:     albuterol-ipratropium, 3 mL, Nebulization, Q6H PRN    aluminum-magnesium hydroxide-simethicone, 30 mL, Oral, QID PRN    dextrose 10%, 12.5 g, Intravenous, PRN    dextrose 10%, 25 g, Intravenous, PRN    glucagon (human recombinant), 1 mg, Intramuscular, PRN    glucose, 16 g, Oral, PRN    glucose, 24 g, Oral, PRN    magnesium sulfate IVPB, 2 g, Intravenous, PRN    melatonin, 6 mg, Oral, Nightly PRN    naloxone, 0.02 mg, Intravenous, PRN    ondansetron, 4 mg, Intravenous, Q8H PRN    simethicone, 1 tablet, Oral, QID PRN    sodium chloride 0.9%, 10 mL, Intravenous, Q12H PRN    sodium chloride 0.9%, 10 mL, Intravenous, PRN    sodium phosphate 20.01 mmol in D5W 250 mL IVPB, 20.01 mmol, Intravenous, PRN    sodium phosphate 30 mmol in D5W 250 mL IVPB, 30 mmol, Intravenous, PRN    sodium phosphate 39.99 mmol in D5W 250 mL IVPB, 39.99  mmol, Intravenous, PRN    Family History       Problem Relation (Age of Onset)    Cancer Maternal Uncle    Heart disease Maternal Uncle    Hyperlipidemia Brother    Ovarian cancer Sister          Tobacco Use    Smoking status: Never    Smokeless tobacco: Never   Substance and Sexual Activity    Alcohol use: Not Currently     Alcohol/week: 0.0 standard drinks of alcohol    Drug use: No    Sexual activity: Not on file       Review of Systems   Constitutional:  Positive for activity change.   Respiratory:  Negative for shortness of breath.    Cardiovascular:  Positive for leg swelling.   Gastrointestinal:  Positive for constipation.   Neurological:  Positive for weakness.     Objective:     Vital Signs (Most Recent):  Temp: 96.8 °F (36 °C) (12/04/24 0301)  Pulse: 89 (12/04/24 0952)  Resp: 17 (12/04/24 0816)  BP: (!) 120/40 (12/04/24 0952)  SpO2: 97 % (12/04/24 0816) Vital Signs (24h Range):  Temp:  [96.8 °F (36 °C)-98.2 °F (36.8 °C)] 96.8 °F (36 °C)  Pulse:  [80-94] 89  Resp:  [12-21] 17  SpO2:  [95 %-98 %] 97 %  BP: (120)/(40-42) 120/40  Arterial Line BP: (107-132)/(38-50) 119/42     Weight: (!) 149 kg (328 lb 7.8 oz)  Body mass index is 43.34 kg/m².       Physical Exam  Constitutional:       Appearance: He is overweight.   HENT:      Head: Normocephalic and atraumatic.   Pulmonary:      Effort: Pulmonary effort is normal.   Musculoskeletal:      Comments: Edema noted to extremities.    Skin:     General: Skin is warm and dry.   Neurological:      Mental Status: He is alert and oriented to person, place, and time.   Psychiatric:         Behavior: Behavior is cooperative.            Review of Symptoms      Symptom Assessment (ESAS 0-10 Scale)  Pain:  0  Dyspnea:  0  Anxiety:  0  Nausea:  0  Depression:  0  Anorexia:  0  Fatigue:  0  Insomnia:  0  Restlessness:  0  Agitation:  0       Constipation:  Constipation    Psychosocial/Cultural:   See Palliative Psychosocial Note: Yes  **Primary  to  Follow**  Palliative Care  Consult: Yes        Advance Care Planning  Advance Directives:   Living Will: No    LaPOST: No    Do Not Resuscitate Status: No    Medical Power of : No    Agent's Name:  Pt reports he has fille ut URIEL and his son is URIEL.    Decision Making:  Patient answered questions  Goals of Care: The patient endorses that what is most important right now is to focus on remaining as independent as possible    Accordingly, we have decided that the best plan to meet the patient's goals includes continuing with treatment         Significant Labs: All pertinent labs within the past 24 hours have been reviewed.  CBC:   Recent Labs   Lab 12/04/24 0222   WBC 8.86   HGB 7.5*   HCT 23.9*   MCV 78*   PLT 44*     BMP:  Recent Labs   Lab 12/04/24 0222   *   *   K 3.7      CO2 19*   BUN 11   CREATININE 2.2*   CALCIUM 7.8*   MG 1.8     LFT:  Lab Results   Component Value Date    AST 17 12/04/2024    GGT 30 12/13/2022    ALKPHOS 47 12/04/2024    BILITOT 3.5 (H) 12/04/2024     Albumin:   Albumin   Date Value Ref Range Status   12/04/2024 2.5 (L) 3.5 - 5.2 g/dL Final     Protein:   Total Protein   Date Value Ref Range Status   12/04/2024 5.4 (L) 6.0 - 8.4 g/dL Final     Lactic acid:   Lab Results   Component Value Date    LACTATE 2.7 (H) 11/28/2024    LACTATE 2.9 (H) 11/28/2024       Significant Imaging: I have reviewed all pertinent imaging results/findings within the past 24 hours.      > 50% of  55 min visit spent in chart review, face to face discussion of goals of care,  symptom assessment, coordination of care and emotional support     Mimi Guerrero, CNS  Palliative Medicine  [facility] - Medical ICU

## 2024-12-04 NOTE — PROGRESS NOTES
[facility] - Medical ICU  Critical Care Medicine  Progress Note    Patient Name: Juan Carlos Yoo Sr.  MRN: [id number]  Admission Date: 11/20/2024  Hospital Length of Stay: 14 days  Code Status: Full Code  Attending Provider: Evelyn Amos MD  Primary Care Provider: Nyla Rios FNP   Principal Problem: Decompensated cirrhosis    Subjective:     HPI:  Juan Carlos Yoo is a 69 male with PMH of alcoholic cirrhosis, esophageal varices, Afib on eliquis, and HTN who presents for c/o worsening diffuse swelling as well as R arm pain/erythema. He was recently hospitalized 1 month ago at [facility] for volume overload in the setting of decompensated cirrhosis. He was treated with IV diuresis and discharged with adjustment to his diuretics, currently on lasix 60mg BID and metolazone 2.5mg every other week as per family. Patient reports diffuse swelling of his upper and lower extremities in addition to distended abdomen and worsening dyspnea, which he believes is due to recent medication adjustment. Family states he is non-compliant with low sodium diet and fluid restriction. Family states he has been compliant with diuretics, but rom't taken eliquis for 3 days due to issue getting refill. He also reports scratching right arm on door frame 3-4 days ago which has become red and painful after wrapping in in bandage. He claims to be compliant with medications, but is a poor historian. He follows with hepatology, not currently active on transplant list. He states he hasn't consumed alcohol for at least 9 months. Has c/o chills, but denies fever, cough, nausea, vomiting, chest pain, dysuria, hematuria, blood in stool. Workup in ED remarkable for VS: T 97.6 HR 94 RR 22 BP 95/56 O2 sat 97% on RA. Hb 6.7, MCV 75, Plt 81, PT/INR 22.6/2.2, Na 128, K 6.1, BUN/Cr 49/5.7, Alb 2.8, AST/ALT 35/9, Ammonia 104, , Trop neg, LA 2.9, CXR: Cardiac size is enlarged similar to prior.  No large volume of pleural fluid noted although there is mild  blunting of the right costophrenic angle which may relate to a small amount of pleural fluid.  Suspected right basilar opacity, to be correlated clinically for infection. RUE venous doppler: No thrombus in central veins of the right upper extremity. Patient received vanc/zosyn and lasix 80mg IVP in ED and will be admitted to hospital medicine service for further management.     Pt was admitted to hospital medicine. Blood cultures positive for Gram-positive cocci and Gram-negative rods. ID has been consulted. S/p 2 units PRBC for Hemoglobin dropped 6.8 on 11/21. Pt was on Eliquis for atrial fibrillation prior to admission which was held.  Hepatology following patient's clinical course, but are not evaluating him for transplant at this time. Diuretics were held because of concern for HRS.  Patient was started on albumin and midodrine per Nephrology recommendations for HRS.  Renal function continued to worsen and recommendation per Nephrology to transfer to ICU for maintaining goal map over 85 on Levophed.  ICU was notified and patient to be assessed to be transferred to ICU.       Hospital/ICU Course:  69 y.o. male with history of EtOH use disorder, EtOH cirrhosis, HCC s/p y90, morbid obesity BMI 44, HTN, and Afib who presents with severe anasarca and JAE.      Appreciate hepatology and nephrology recommendations.  Diuretics were held because of concern for HRS.  Patient was started on albumin and midodrine per Nephrology recommendations for HRS.  Renal function continued to worsen and recommendation per Nephrology to transfer to ICU for maintaining goal map over 85 on Levophed.  ICU was notified and patient to be assessed to be transferred to ICU.     Patient also has Gram-positive cocci and Gram-negative rods in his blood now.  Possible sources could be got translocation and barrier related infection through skin as he has been significantly edematous and has been oozing.  Has been on vancomycin and Rocephin.  Id  was consulted this morning.  Repeat blood cultures were obtained.     Continues to have anemia.  Hemoglobin dropped on 11/20 and was given 1 unit of packed red blood cells.  Did not respond appropriately.  Hemoglobin dropped again to 6.8 on 11/21 and was given 1 unit of packed red blood cells.  Hemoglobin again on 11/22 is 7.2.  No reported melena or hematochezia.  Patient was on Eliquis for atrial fibrillation prior to admission which was held.  Given history of esophageal varices and portal hypertensive gastropathy whereas also could be component of slow bleeding, under production due to CKD and hemolysis while also with decompensated cirrhosis.  Started on Protonix IV b.i.d. and octreotide.     Also clarified with hepatology.  Given patient's current infection we will await ID recommendations however we will be challenging to consider transplant evaluation at this time.  Trend clinical course     Goal clarification with the patient and family.  Patient at this time wants to be full code and continue with Levophed in ICU.  They would consider discussion with palliative care in a day or so if things do not get better.     In MICU patient started on Levophed, however titration remained difficult given tachycardic response from the patient.     11/24 Trialysis and arterial lines placed overnight and currently on levo and norepi. SLED today.    11/25 Boo dc. Increased midodrine. Continue steroids until d/c pressors. Discussed with Nephrology about our concern for sustained use of pressors to achieve their MAP goals of 85. Will follow up tomorrow.     11/26 Platelets 48. Repeat 38 confirming thrombocytopenia. Concern for HIT. D/c heparin. Increased lactulose dosing. Bladder scan revealed urine in bladder. Boo placed. Off vaso. Continuing levo. Maintain MAP goals 80. PT/OT consulted.     11/27 MAP goal 75-80. Heparin antibody result pending.     11/28 Pt with abdominal pain, decreased bowel movements, emesis. Lactic  acid 2.9 and repeat 2.7. Less concerned for mesenteric ischemia and more of a concern was for SBO. NG tube placed with subsequent suction of 500mL fluid. Abdomen less distended after placement and pt subsequently had bowel movement. MAP Goal of 60 with plan to come off pressors entirely and switch to dialysis after permacath, as he is not  liver transplant eligible at this time.     11/29 Pt with ileus. Clear liquids for now. Platelets 24. HIT versus zosyn induced? Peripheral smear sent. Zosyn changed to kaylah. Consent and transfuse 1U. GOC conversation today. Pt opting for long term dialysis.     11/30 naeon. Started back on heparin. One time dose of 120mg lasix today. Hold off SLED/SCUF today and give IV lasix 120 mg once; plan for SLED/SCUF tomorrow     12/1 Patient is becoming more dependent on dialysis. Not a current liver transplant candidate. Still complaining of abdominal pain after discontinuing the NGT. AXR with evidence of dilated bowel loops. Brown bomb administered. The days following administration of the brown bomb, patient had more frequent bowel movements and was able to pass flatulence.     Interval History/Significant Events: NAEON. Patient reports feeling well, has no new concerns at this time.     Denies HA, fevers, chills, chest pain, palpitations, SOB, N/V,     Review of Systems  Objective:     Vital Signs (Most Recent):  Temp: 97.6 °F (36.4 °C) (12/04/24 0715)  Pulse: 91 (12/04/24 1100)  Resp: 15 (12/04/24 1100)  BP: (!) 120/40 (12/04/24 0952)  SpO2: 97 % (12/04/24 1100) Vital Signs (24h Range):  Temp:  [96.8 °F (36 °C)-98.2 °F (36.8 °C)] 97.6 °F (36.4 °C)  Pulse:  [80-94] 91  Resp:  [12-21] 15  SpO2:  [95 %-98 %] 97 %  BP: (120)/(40-42) 120/40  Arterial Line BP: (107-130)/(36-49) 126/37   Weight: (!) 149 kg (328 lb 7.8 oz)  Body mass index is 43.34 kg/m².      Intake/Output Summary (Last 24 hours) at 12/4/2024 1221  Last data filed at 12/4/2024 0826  Gross per 24 hour   Intake 3627.01 ml    Output 4155 ml   Net -527.99 ml          Physical Exam  Vitals and nursing note reviewed.   Constitutional:       General: He is awake. He is not in acute distress.     Appearance: He is obese. He is ill-appearing. He is not toxic-appearing.   HENT:      Head: Normocephalic and atraumatic.   Eyes:      General: No scleral icterus.     Extraocular Movements: Extraocular movements intact.      Conjunctiva/sclera: Conjunctivae normal.   Cardiovascular:      Rate and Rhythm: Normal rate. Rhythm irregular.   Pulmonary:      Effort: Pulmonary effort is normal. No respiratory distress.   Abdominal:      General: There is distension.      Palpations: Abdomen is soft.      Tenderness: There is no abdominal tenderness. There is no guarding or rebound.   Musculoskeletal:         General: No swelling or tenderness.      Right lower leg: Edema present.      Left lower leg: Edema present.      Comments: 2+ pitting edema in bilateral lower extremities   Skin:     General: Skin is warm.      Coloration: Skin is not jaundiced.      Findings: Bruising present.   Neurological:      Mental Status: He is alert and oriented to person, place, and time.      Motor: No weakness.            Vents:     Lines/Drains/Airways       Central Venous Catheter Line  Duration             Trialysis (Dialysis) Catheter 11/24/24 0441 right internal jugular 10 days              Drain  Duration                  Urethral Catheter 12/03/24 1607 Silicone 16 Fr. <1 day              Arterial Line  Duration             Arterial Line 11/24/24 0317 Right Radial 10 days              Peripheral Intravenous Line  Duration                  Peripheral IV - Single Lumen 11/27/24 1101 20 G Anterior;Left Forearm 7 days                  Significant Labs:    CBC/Anemia Profile:  Recent Labs   Lab 12/03/24  0320 12/04/24  0222   WBC 8.32 8.86   HGB 7.6* 7.5*   HCT 23.6* 23.9*   PLT 45* 44*   MCV 77* 78*   RDW 23.9* 23.9*        Chemistries:  Recent Labs   Lab  "12/03/24  0907 12/03/24  1328 12/03/24  2254 12/04/24  0222 12/04/24  0952   NA  --  134* 129*  129* 133*  --    K  --  3.8 3.5  3.5 3.7  --    CL  --  107 103  103 107  --    CO2  --  19* 18*  18* 19*  --    BUN  --  10 11  11 11  --    CREATININE  --  2.1* 2.1*  2.1* 2.2*  --    CALCIUM  --  7.8* 7.9*  7.9* 7.8*  --    ALBUMIN 2.5* 2.6* 2.4*  2.4* 2.5* 2.5*   PROT 5.3*  --   --  5.4* 5.5*   BILITOT 3.8*  --   --  3.5* 3.7*   ALKPHOS 42  --   --  47 48   ALT 7*  --   --  8* 8*   AST 14  --   --  17 16   MG  --  1.8 1.8  1.8 1.8 1.8   PHOS  --  3.5  3.5 3.7  3.7 3.5  --        All pertinent labs within the past 24 hours have been reviewed.    Significant Imaging:  I have reviewed all pertinent imaging results/findings within the past 24 hours.    ABG  No results for input(s): "PH", "PO2", "PCO2", "HCO3", "BE" in the last 168 hours.  Assessment/Plan:     Neuro  Encephalopathy, metabolic  AAOx4  Will assess for signs of encephalopathy     Cardiac/Vascular  Atrial fibrillation  Holding home Eliquis in the setting of recent low blood counts  Continue metoprolol 12.5    Renal/  Stage 3a chronic kidney disease  Nephrology following  Will try to remove more fluid  Strict intake and output  Renally dose all medications  Avoid nephrotoxic agents    JAE (acute kidney injury)  Baseline creatinine is 1.5. May require dialysis long term.   MAP goal 55-60  Nephrology following for dialysis  Will attempt to remove more fluid - goal is net negative 1-2L    ID  Gram-negative bacteremia  Blood cultures from admission with B. Diminuta, micrococcus luteus, lysinobacillus species. Seen by ID previously who recommended stopping antibiotics due to concern these were contaminants. Repeat blood cultures have been no growth. Has since been transferred to ICU with increased pressor requirement. Blood cultures repeated on 11/24 are no growth x 24 hours.   11/29 Switched from zosyn to meropenem due to concern of thrombocytopenia. "   Continue meropenem.  Tentative end date for abx 12/8    Severe sepsis  This patient does have evidence of infective focus  My overall impression is sepsis.  Source: Abdominal  Antibiotics given-   Antibiotics (72h ago, onward)      Start     Stop Route Frequency Ordered    12/02/24 1800  meropenem injection 1 g         -- IV Every 12 hours (non-standard times) 12/02/24 1517          Organ dysfunction indicated by Acute kidney injury    Fluid challenge Contraindicated- Fluid bolus is contraindicated in this patient due to End Stage Liver Disease     Post- resuscitation assessment No - Post resuscitation assessment not needed     Source control achieved by: antibiotics  12/8 should be final day for abx    Hematology  Thrombocytopenia  Daily CBC  Transfuse for PLT<10k, or PLT<50K with active bleeding  Monitor for signs and symptoms of bleeding    Coagulopathy  End Stage Liver Disease precipitated coagulopathy  Heparin d/c d/t thrombocytopenia    Endocrine  Hyponatremia  Likely dilutional secondary to end stage liver disease and HRS.   CTM, correct if appropriate    GI  * Decompensated cirrhosis  MELD 3.0: 32 at 11/29/2024  9:57 PM  MELD-Na: 32 at 11/29/2024  9:57 PM  Calculated from:  Serum Creatinine: On dialysis. Using the maximum value.  Serum Sodium: 132 mmol/L at 11/29/2024  9:57 PM  Total Bilirubin: 2.9 mg/dL at 11/29/2024 10:34 AM  Serum Albumin: 2.8 g/dL at 11/29/2024  9:57 PM  INR(ratio): 2 at 11/27/2024  2:09 AM  Age at listing (hypothetical): 69 years  Sex: Male at 11/29/2024  9:57 PM    Hepatology following, pt not transplant eligible at this time.       Abdominal pain  Patient developed abdominal pain 2/2 constipation last week and NGT was placed. 11/30 NGT was removed. The day after the NGT was removed he developed constipation and dilated bowel loops on AXR.  Some BM after brown bomb enema.   Continue to monitor       Critical Care Daily Checklist:    A: Awake: RASS Goal/Actual Goal: RASS Goal:  0-->alert and calm  Actual:     B: Spontaneous Breathing Trial Performed?     C: SAT & SBT Coordinated?  NA                 D: Delirium: CAM-ICU Overall CAM-ICU: Negative   E: Early Mobility Performed? Yes   F: Feeding Goal: Goals: to meet % of EEN/EPN by next RD f/u  Status: Nutrition Goal Status: new   Current Diet Order   Procedures    Diet Renal Standard Tray     Order Specific Question:   Tray type:     Answer:   Standard Tray      AS: Analgesia/Sedation NA   T: Thromboembolic Prophylaxis NO   H: HOB > 300 Yes   U: Stress Ulcer Prophylaxis (if needed) YES   G: Glucose Control    B: Bowel Function Stool Occurrence: 1   I: Indwelling Catheter (Lines & Peacock) Necessity Trialysis, PIV, peacock   D: De-escalation of Antimicrobials/Pharmacotherapies Meropenem    Plan for the day/ETD Fluid removal    Code Status:  Family/Goals of Care: Full Code       Critical secondary to Patient has a condition that poses threat to life and bodily function: Acute Renal Failure      Critical care was time spent personally by me on the following activities: development of treatment plan with patient or surrogate and bedside caregivers, discussions with consultants, evaluation of patient's response to treatment, examination of patient, ordering and performing treatments and interventions, ordering and review of laboratory studies, ordering and review of radiographic studies, pulse oximetry, re-evaluation of patient's condition. This critical care time did not overlap with that of any other provider or involve time for any procedures.     Rhona Lewis, DO  Critical Care Medicine  [facility] - Medical ICU

## 2024-12-04 NOTE — ASSESSMENT & PLAN NOTE
Blood cultures from admission with B. Diminuta, micrococcus luteus, lysinobacillus species. Seen by ID previously who recommended stopping antibiotics due to concern these were contaminants. Repeat blood cultures have been no growth. Has since been transferred to ICU with increased pressor requirement. Blood cultures repeated on 11/24 are no growth x 24 hours.   11/29 Switched from zosyn to meropenem due to concern of thrombocytopenia.   Continue meropenem.  Tentative end date for abx 12/8

## 2024-12-04 NOTE — ASSESSMENT & PLAN NOTE
Baseline creatinine is 1.5. May require dialysis long term.   MAP goal 55-60  Nephrology following for dialysis  Will attempt to remove more fluid - goal is net negative 1-2L

## 2024-12-05 PROBLEM — I95.9 HYPOTENSION: Status: ACTIVE | Noted: 2024-12-05

## 2024-12-05 LAB
ALBUMIN SERPL BCP-MCNC: 2.3 G/DL (ref 3.5–5.2)
ALBUMIN SERPL BCP-MCNC: 2.3 G/DL (ref 3.5–5.2)
ALBUMIN SERPL BCP-MCNC: 2.4 G/DL (ref 3.5–5.2)
ALP SERPL-CCNC: 48 U/L (ref 40–150)
ALP SERPL-CCNC: 49 U/L (ref 40–150)
ALT SERPL W/O P-5'-P-CCNC: 10 U/L (ref 10–44)
ALT SERPL W/O P-5'-P-CCNC: 11 U/L (ref 10–44)
ANION GAP SERPL CALC-SCNC: 10 MMOL/L (ref 8–16)
ANION GAP SERPL CALC-SCNC: 10 MMOL/L (ref 8–16)
ANION GAP SERPL CALC-SCNC: 4 MMOL/L (ref 8–16)
ANION GAP SERPL CALC-SCNC: 5 MMOL/L (ref 8–16)
ANION GAP SERPL CALC-SCNC: 8 MMOL/L (ref 8–16)
ANISOCYTOSIS BLD QL SMEAR: ABNORMAL
AST SERPL-CCNC: 21 U/L (ref 10–40)
AST SERPL-CCNC: 21 U/L (ref 10–40)
BASOPHILS # BLD AUTO: 0.02 K/UL (ref 0–0.2)
BASOPHILS NFR BLD: 0.3 % (ref 0–1.9)
BILIRUB DIRECT SERPL-MCNC: 1.4 MG/DL (ref 0.1–0.3)
BILIRUB SERPL-MCNC: 3 MG/DL (ref 0.1–1)
BILIRUB SERPL-MCNC: 3 MG/DL (ref 0.1–1)
BUN SERPL-MCNC: 5 MG/DL (ref 8–23)
BUN SERPL-MCNC: 8 MG/DL (ref 8–23)
BUN SERPL-MCNC: 9 MG/DL (ref 8–23)
BURR CELLS BLD QL SMEAR: ABNORMAL
CALCIUM SERPL-MCNC: 7.5 MG/DL (ref 8.7–10.5)
CALCIUM SERPL-MCNC: 7.7 MG/DL (ref 8.7–10.5)
CALCIUM SERPL-MCNC: 7.8 MG/DL (ref 8.7–10.5)
CHLORIDE SERPL-SCNC: 107 MMOL/L (ref 95–110)
CHLORIDE SERPL-SCNC: 107 MMOL/L (ref 95–110)
CHLORIDE SERPL-SCNC: 108 MMOL/L (ref 95–110)
CHLORIDE SERPL-SCNC: 109 MMOL/L (ref 95–110)
CHLORIDE SERPL-SCNC: 109 MMOL/L (ref 95–110)
CO2 SERPL-SCNC: 18 MMOL/L (ref 23–29)
CO2 SERPL-SCNC: 18 MMOL/L (ref 23–29)
CO2 SERPL-SCNC: 19 MMOL/L (ref 23–29)
CO2 SERPL-SCNC: 21 MMOL/L (ref 23–29)
CO2 SERPL-SCNC: 22 MMOL/L (ref 23–29)
CORTIS SERPL-MCNC: 14.5 UG/DL
CORTIS SERPL-MCNC: 16.5 UG/DL
CORTIS SERPL-MCNC: 8.9 UG/DL
CREAT SERPL-MCNC: 1.3 MG/DL (ref 0.5–1.4)
CREAT SERPL-MCNC: 1.8 MG/DL (ref 0.5–1.4)
CREAT SERPL-MCNC: 2 MG/DL (ref 0.5–1.4)
DIFFERENTIAL METHOD BLD: ABNORMAL
EOSINOPHIL # BLD AUTO: 0.1 K/UL (ref 0–0.5)
EOSINOPHIL NFR BLD: 1.8 % (ref 0–8)
ERYTHROCYTE [DISTWIDTH] IN BLOOD BY AUTOMATED COUNT: 24.1 % (ref 11.5–14.5)
EST. GFR  (NO RACE VARIABLE): 35 ML/MIN/1.73 M^2
EST. GFR  (NO RACE VARIABLE): 39.7 ML/MIN/1.73 M^2
EST. GFR  (NO RACE VARIABLE): 58.7 ML/MIN/1.73 M^2
GLUCOSE SERPL-MCNC: 107 MG/DL (ref 70–110)
GLUCOSE SERPL-MCNC: 107 MG/DL (ref 70–110)
GLUCOSE SERPL-MCNC: 109 MG/DL (ref 70–110)
GLUCOSE SERPL-MCNC: 109 MG/DL (ref 70–110)
GLUCOSE SERPL-MCNC: 116 MG/DL (ref 70–110)
HCT VFR BLD AUTO: 22.8 % (ref 40–54)
HGB BLD-MCNC: 7 G/DL (ref 14–18)
HYPOCHROMIA BLD QL SMEAR: ABNORMAL
IMM GRANULOCYTES # BLD AUTO: 0.04 K/UL (ref 0–0.04)
IMM GRANULOCYTES NFR BLD AUTO: 0.5 % (ref 0–0.5)
LYMPHOCYTES # BLD AUTO: 0.7 K/UL (ref 1–4.8)
LYMPHOCYTES NFR BLD: 8.6 % (ref 18–48)
MAGNESIUM SERPL-MCNC: 1.8 MG/DL (ref 1.6–2.6)
MAGNESIUM SERPL-MCNC: 2 MG/DL (ref 1.6–2.6)
MAGNESIUM SERPL-MCNC: 2.1 MG/DL (ref 1.6–2.6)
MCH RBC QN AUTO: 24.1 PG (ref 27–31)
MCHC RBC AUTO-ENTMCNC: 30.7 G/DL (ref 32–36)
MCV RBC AUTO: 79 FL (ref 82–98)
MONOCYTES # BLD AUTO: 1.4 K/UL (ref 0.3–1)
MONOCYTES NFR BLD: 17 % (ref 4–15)
NEUTROPHILS # BLD AUTO: 5.7 K/UL (ref 1.8–7.7)
NEUTROPHILS NFR BLD: 71.8 % (ref 38–73)
NRBC BLD-RTO: 0 /100 WBC
OVALOCYTES BLD QL SMEAR: ABNORMAL
PHOSPHATE SERPL-MCNC: 2.2 MG/DL (ref 2.7–4.5)
PHOSPHATE SERPL-MCNC: 3 MG/DL (ref 2.7–4.5)
PHOSPHATE SERPL-MCNC: 3.1 MG/DL (ref 2.7–4.5)
PHOSPHATE SERPL-MCNC: 3.2 MG/DL (ref 2.7–4.5)
PHOSPHATE SERPL-MCNC: 3.2 MG/DL (ref 2.7–4.5)
PLATELET # BLD AUTO: 37 K/UL (ref 150–450)
PLATELET BLD QL SMEAR: ABNORMAL
PMV BLD AUTO: ABNORMAL FL (ref 9.2–12.9)
POIKILOCYTOSIS BLD QL SMEAR: ABNORMAL
POLYCHROMASIA BLD QL SMEAR: ABNORMAL
POTASSIUM SERPL-SCNC: 3.6 MMOL/L (ref 3.5–5.1)
POTASSIUM SERPL-SCNC: 3.9 MMOL/L (ref 3.5–5.1)
PROT SERPL-MCNC: 5.2 G/DL (ref 6–8.4)
PROT SERPL-MCNC: 5.4 G/DL (ref 6–8.4)
RBC # BLD AUTO: 2.9 M/UL (ref 4.6–6.2)
SCHISTOCYTES BLD QL SMEAR: ABNORMAL
SCHISTOCYTES BLD QL SMEAR: PRESENT
SODIUM SERPL-SCNC: 134 MMOL/L (ref 136–145)
SODIUM SERPL-SCNC: 135 MMOL/L (ref 136–145)
SODIUM SERPL-SCNC: 136 MMOL/L (ref 136–145)
SPHEROCYTES BLD QL SMEAR: ABNORMAL
TARGETS BLD QL SMEAR: ABNORMAL
WBC # BLD AUTO: 7.95 K/UL (ref 3.9–12.7)

## 2024-12-05 PROCEDURE — 84100 ASSAY OF PHOSPHORUS: CPT

## 2024-12-05 PROCEDURE — 83735 ASSAY OF MAGNESIUM: CPT | Mod: 91 | Performed by: STUDENT IN AN ORGANIZED HEALTH CARE EDUCATION/TRAINING PROGRAM

## 2024-12-05 PROCEDURE — 82533 TOTAL CORTISOL: CPT | Mod: 91 | Performed by: INTERNAL MEDICINE

## 2024-12-05 PROCEDURE — 80069 RENAL FUNCTION PANEL: CPT | Mod: 91 | Performed by: STUDENT IN AN ORGANIZED HEALTH CARE EDUCATION/TRAINING PROGRAM

## 2024-12-05 PROCEDURE — 83735 ASSAY OF MAGNESIUM: CPT

## 2024-12-05 PROCEDURE — 80100008 HC CRRT DAILY MAINTENANCE

## 2024-12-05 PROCEDURE — 25000003 PHARM REV CODE 250: Performed by: INTERNAL MEDICINE

## 2024-12-05 PROCEDURE — 80076 HEPATIC FUNCTION PANEL: CPT

## 2024-12-05 PROCEDURE — 90945 DIALYSIS ONE EVALUATION: CPT

## 2024-12-05 PROCEDURE — 25000003 PHARM REV CODE 250

## 2024-12-05 PROCEDURE — 80053 COMPREHEN METABOLIC PANEL: CPT

## 2024-12-05 PROCEDURE — 25000003 PHARM REV CODE 250: Performed by: STUDENT IN AN ORGANIZED HEALTH CARE EDUCATION/TRAINING PROGRAM

## 2024-12-05 PROCEDURE — 20000000 HC ICU ROOM

## 2024-12-05 PROCEDURE — 63600175 PHARM REV CODE 636 W HCPCS: Performed by: STUDENT IN AN ORGANIZED HEALTH CARE EDUCATION/TRAINING PROGRAM

## 2024-12-05 PROCEDURE — 63600175 PHARM REV CODE 636 W HCPCS: Performed by: INTERNAL MEDICINE

## 2024-12-05 PROCEDURE — 82024 ASSAY OF ACTH: CPT | Performed by: INTERNAL MEDICINE

## 2024-12-05 PROCEDURE — 85025 COMPLETE CBC W/AUTO DIFF WBC: CPT

## 2024-12-05 PROCEDURE — 94761 N-INVAS EAR/PLS OXIMETRY MLT: CPT

## 2024-12-05 PROCEDURE — 99232 SBSQ HOSP IP/OBS MODERATE 35: CPT | Mod: ,,, | Performed by: INTERNAL MEDICINE

## 2024-12-05 RX ORDER — MAGNESIUM SULFATE HEPTAHYDRATE 40 MG/ML
2 INJECTION, SOLUTION INTRAVENOUS
Status: DISCONTINUED | OUTPATIENT
Start: 2024-12-05 | End: 2024-12-06

## 2024-12-05 RX ORDER — COSYNTROPIN 0.25 MG/ML
0.25 INJECTION, POWDER, FOR SOLUTION INTRAMUSCULAR; INTRAVENOUS ONCE
Status: COMPLETED | OUTPATIENT
Start: 2024-12-05 | End: 2024-12-05

## 2024-12-05 RX ORDER — HYDROCORTISONE 5 MG/1
10 TABLET ORAL DAILY
Status: COMPLETED | OUTPATIENT
Start: 2024-12-05 | End: 2024-12-14

## 2024-12-05 RX ORDER — HYDROCORTISONE 5 MG/1
15 TABLET ORAL
Status: COMPLETED | OUTPATIENT
Start: 2024-12-06 | End: 2024-12-14

## 2024-12-05 RX ORDER — HYDROCODONE BITARTRATE AND ACETAMINOPHEN 500; 5 MG/1; MG/1
TABLET ORAL CONTINUOUS
Status: DISCONTINUED | OUTPATIENT
Start: 2024-12-05 | End: 2024-12-06

## 2024-12-05 RX ADMIN — LACTULOSE 30 G: 20 SOLUTION ORAL at 09:12

## 2024-12-05 RX ADMIN — NOREPINEPHRINE BITARTRATE 0.06 MCG/KG/MIN: 16 SOLUTION INTRAVENOUS at 10:12

## 2024-12-05 RX ADMIN — MEROPENEM 1 G: 1 INJECTION, POWDER, FOR SOLUTION INTRAVENOUS at 06:12

## 2024-12-05 RX ADMIN — METOPROLOL TARTRATE 12.5 MG: 25 TABLET, FILM COATED ORAL at 08:12

## 2024-12-05 RX ADMIN — PANTOPRAZOLE SODIUM 40 MG: 40 TABLET, DELAYED RELEASE ORAL at 08:12

## 2024-12-05 RX ADMIN — NOREPINEPHRINE BITARTRATE 0.06 MCG/KG/MIN: 16 SOLUTION INTRAVENOUS at 12:12

## 2024-12-05 RX ADMIN — MIDODRINE HYDROCHLORIDE 20 MG: 5 TABLET ORAL at 06:12

## 2024-12-05 RX ADMIN — HYDROCORTISONE 10 MG: 5 TABLET ORAL at 02:12

## 2024-12-05 RX ADMIN — LACTULOSE 30 G: 20 SOLUTION ORAL at 02:12

## 2024-12-05 RX ADMIN — COSYNTROPIN 0.25 MG: 0.25 INJECTION, POWDER, LYOPHILIZED, FOR SOLUTION INTRAMUSCULAR; INTRAVENOUS at 11:12

## 2024-12-05 RX ADMIN — MIDODRINE HYDROCHLORIDE 20 MG: 5 TABLET ORAL at 01:12

## 2024-12-05 RX ADMIN — SODIUM CHLORIDE: 9 INJECTION, SOLUTION INTRAVENOUS at 04:12

## 2024-12-05 RX ADMIN — MAGNESIUM SULFATE HEPTAHYDRATE 2 G: 40 INJECTION, SOLUTION INTRAVENOUS at 11:12

## 2024-12-05 RX ADMIN — METOPROLOL TARTRATE 12.5 MG: 25 TABLET, FILM COATED ORAL at 09:12

## 2024-12-05 RX ADMIN — NOREPINEPHRINE BITARTRATE 0.07 MCG/KG/MIN: 16 SOLUTION INTRAVENOUS at 08:12

## 2024-12-05 RX ADMIN — MIDODRINE HYDROCHLORIDE 20 MG: 5 TABLET ORAL at 09:12

## 2024-12-05 RX ADMIN — NOREPINEPHRINE BITARTRATE 0.05 MCG/KG/MIN: 16 SOLUTION INTRAVENOUS at 04:12

## 2024-12-05 RX ADMIN — TAMSULOSIN HYDROCHLORIDE 0.4 MG: 0.4 CAPSULE ORAL at 09:12

## 2024-12-05 RX ADMIN — LACTULOSE 30 G: 20 SOLUTION ORAL at 08:12

## 2024-12-05 NOTE — PROGRESS NOTES
12/05/24 0022   Treatment   Treatment Type SCUF   Treatment Status Daily equipment check   Dialysis Machine Number [id number]   Dialyzer Time (hours) 4.47   BVP (Liters) 0 L   Solutions Labeled and Current  Yes   Access Temporary Cath   Catheter Dressing Intact  Yes   Alarms Engaged Yes   CRRT Comments daily check done   CRRT Hourly Documentation   Blood Flow (mL/min) 200   UF Rate 400 cc/hr   Arterial Pressure (mmHg) 50 mmHg   Venous Pressure (mmHg) 60 mmHg   Effluent Pressure (EP) (mmHg) 10 mmHg   Total UF (Hourly Cleared) (mL) 114     Daily equipment check done, lines secured done

## 2024-12-05 NOTE — PROGRESS NOTES
[facility] - Medical [facility]  Critical Care Medicine  Progress Note    Patient Name: Juan Carlos Yoo Sr.  MRN: [id number]  Admission Date: 11/20/2024  Hospital Length of Stay: 15 days  Code Status: Full Code  Attending Provider: Evelyn Amos MD  Primary Care Provider: Nyla Rios FNP   Principal Problem: Decompensated cirrhosis    Subjective:     HPI:  Juan Carlos Yoo is a 69 male with PMH of alcoholic cirrhosis, esophageal varices, Afib on eliquis, and HTN who presents for c/o worsening diffuse swelling as well as R arm pain/erythema. He was recently hospitalized 1 month ago at [facility] for volume overload in the setting of decompensated cirrhosis. He was treated with IV diuresis and discharged with adjustment to his diuretics, currently on lasix 60mg BID and metolazone 2.5mg every other week as per family. Patient reports diffuse swelling of his upper and lower extremities in addition to distended abdomen and worsening dyspnea, which he believes is due to recent medication adjustment. Family states he is non-compliant with low sodium diet and fluid restriction. Family states he has been compliant with diuretics, but rom't taken eliquis for 3 days due to issue getting refill. He also reports scratching right arm on door frame 3-4 days ago which has become red and painful after wrapping in in bandage. He claims to be compliant with medications, but is a poor historian. He follows with hepatology, not currently active on transplant list. He states he hasn't consumed alcohol for at least 9 months. Has c/o chills, but denies fever, cough, nausea, vomiting, chest pain, dysuria, hematuria, blood in stool. Workup in ED remarkable for VS: T 97.6 HR 94 RR 22 BP 95/56 O2 sat 97% on RA. Hb 6.7, MCV 75, Plt 81, PT/INR 22.6/2.2, Na 128, K 6.1, BUN/Cr 49/5.7, Alb 2.8, AST/ALT 35/9, Ammonia 104, , Trop neg, LA 2.9, CXR: Cardiac size is enlarged similar to prior.  No large volume of pleural fluid noted although there is mild  blunting of the right costophrenic angle which may relate to a small amount of pleural fluid.  Suspected right basilar opacity, to be correlated clinically for infection. RUE venous doppler: No thrombus in central veins of the right upper extremity. Patient received vanc/zosyn and lasix 80mg IVP in ED and will be admitted to hospital medicine service for further management.     Pt was admitted to hospital medicine. Blood cultures positive for Gram-positive cocci and Gram-negative rods. ID has been consulted. S/p 2 units PRBC for Hemoglobin dropped 6.8 on 11/21. Pt was on Eliquis for atrial fibrillation prior to admission which was held.  Hepatology following patient's clinical course, but are not evaluating him for transplant at this time. Diuretics were held because of concern for HRS.  Patient was started on albumin and midodrine per Nephrology recommendations for HRS.  Renal function continued to worsen and recommendation per Nephrology to transfer to ICU for maintaining goal map over 85 on Levophed.  ICU was notified and patient to be assessed to be transferred to ICU.       Hospital/ICU Course:  69 y.o. male with history of EtOH use disorder, EtOH cirrhosis, HCC s/p y90, morbid obesity BMI 44, HTN, and Afib who presents with severe anasarca and JAE.      Appreciate hepatology and nephrology recommendations.  Diuretics were held because of concern for HRS.  Patient was started on albumin and midodrine per Nephrology recommendations for HRS.  Renal function continued to worsen and recommendation per Nephrology to transfer to ICU for maintaining goal map over 85 on Levophed.  ICU was notified and patient to be assessed to be transferred to ICU.     Patient also has Gram-positive cocci and Gram-negative rods in his blood now.  Possible sources could be got translocation and barrier related infection through skin as he has been significantly edematous and has been oozing.  Has been on vancomycin and Rocephin.  Id  was consulted this morning.  Repeat blood cultures were obtained.     Continues to have anemia.  Hemoglobin dropped on 11/20 and was given 1 unit of packed red blood cells.  Did not respond appropriately.  Hemoglobin dropped again to 6.8 on 11/21 and was given 1 unit of packed red blood cells.  Hemoglobin again on 11/22 is 7.2.  No reported melena or hematochezia.  Patient was on Eliquis for atrial fibrillation prior to admission which was held.  Given history of esophageal varices and portal hypertensive gastropathy whereas also could be component of slow bleeding, under production due to CKD and hemolysis while also with decompensated cirrhosis.  Started on Protonix IV b.i.d. and octreotide.     Also clarified with hepatology.  Given patient's current infection we will await ID recommendations however we will be challenging to consider transplant evaluation at this time.  Trend clinical course     Goal clarification with the patient and family.  Patient at this time wants to be full code and continue with Levophed in ICU.  They would consider discussion with palliative care in a day or so if things do not get better.     In MICU patient started on Levophed, however titration remained difficult given tachycardic response from the patient.     11/24 Trialysis and arterial lines placed overnight and currently on levo and norepi. SLED today.    11/25 Boo dc. Increased midodrine. Continue steroids until d/c pressors. Discussed with Nephrology about our concern for sustained use of pressors to achieve their MAP goals of 85. Will follow up tomorrow.     11/26 Platelets 48. Repeat 38 confirming thrombocytopenia. Concern for HIT. D/c heparin. Increased lactulose dosing. Bladder scan revealed urine in bladder. Boo placed. Off vaso. Continuing levo. Maintain MAP goals 80. PT/OT consulted.     11/27 MAP goal 75-80. Heparin antibody result pending.     11/28 Pt with abdominal pain, decreased bowel movements, emesis. Lactic  acid 2.9 and repeat 2.7. Less concerned for mesenteric ischemia and more of a concern was for SBO. NG tube placed with subsequent suction of 500mL fluid. Abdomen less distended after placement and pt subsequently had bowel movement. MAP Goal of 60 with plan to come off pressors entirely and switch to dialysis after permacath, as he is not  liver transplant eligible at this time.     11/29 Pt with ileus. Clear liquids for now. Platelets 24. HIT versus zosyn induced? Peripheral smear sent. Zosyn changed to kaylah. Consent and transfuse 1U. GOC conversation today. Pt opting for long term dialysis.     11/30 naeon. Started back on heparin. One time dose of 120mg lasix today. Hold off SLED/SCUF today and give IV lasix 120 mg once; plan for SLED/SCUF tomorrow     12/1 Patient is becoming more dependent on dialysis. Not a current liver transplant candidate. Still complaining of abdominal pain after discontinuing the NGT. AXR with evidence of dilated bowel loops. Brown bomb administered. The days following administration of the brown bomb, patient had more frequent bowel movements and was able to pass flatulence.     Interval History/Significant Events: NAEON. Patient reports not sleeping well due to dialysis machines. Was able to have some bowel movements.     Denies HA, fevers, chills, chest pain, palpitations, SOB, N/V.     Review of Systems  Objective:     Vital Signs (Most Recent):  Temp: 98.5 °F (36.9 °C) (12/05/24 1105)  Pulse: 83 (12/05/24 1105)  Resp: 15 (12/05/24 1105)  BP:  (Williams present) (12/05/24 1105)  SpO2: 95 % (12/05/24 1105) Vital Signs (24h Range):  Temp:  [97.6 °F (36.4 °C)-98.6 °F (37 °C)] 98.5 °F (36.9 °C)  Pulse:  [77-94] 83  Resp:  [11-22] 15  SpO2:  [92 %-98 %] 95 %  BP: (103-124)/(35-40) 103/35  Arterial Line BP: (100-127)/(35-45) 115/45   Weight: (!) 149 kg (328 lb 7.8 oz)  Body mass index is 43.34 kg/m².      Intake/Output Summary (Last 24 hours) at 12/5/2024 1251  Last data filed at 12/5/2024  1100  Gross per 24 hour   Intake 3887.18 ml   Output 5537 ml   Net -1649.82 ml          Physical Exam  Vitals and nursing note reviewed.   Constitutional:       General: He is awake. He is not in acute distress.     Appearance: He is obese. He is ill-appearing. He is not toxic-appearing.   HENT:      Head: Normocephalic and atraumatic.   Eyes:      General: No scleral icterus.     Extraocular Movements: Extraocular movements intact.      Conjunctiva/sclera: Conjunctivae normal.   Cardiovascular:      Rate and Rhythm: Normal rate. Rhythm irregular.   Pulmonary:      Effort: Pulmonary effort is normal. No respiratory distress.   Abdominal:      General: There is distension.      Palpations: Abdomen is soft.      Tenderness: There is no abdominal tenderness. There is no guarding or rebound.   Musculoskeletal:         General: No swelling or tenderness.      Right lower leg: Edema present.      Left lower leg: Edema present.      Comments: 2+ pitting edema in bilateral lower extremities   Skin:     General: Skin is warm.      Coloration: Skin is not jaundiced.      Findings: Bruising present.   Neurological:      Mental Status: He is alert and oriented to person, place, and time.      Motor: No weakness.            Vents:     Lines/Drains/Airways       Central Venous Catheter Line  Duration             Trialysis (Dialysis) Catheter 11/24/24 0441 right internal jugular 11 days              Drain  Duration                  Urethral Catheter 12/03/24 1607 Silicone 16 Fr. 1 day              Arterial Line  Duration             Arterial Line 11/24/24 0317 Right Radial 11 days              Peripheral Intravenous Line  Duration                  Peripheral IV - Single Lumen 11/27/24 1101 20 G Anterior;Left Forearm 8 days                  Significant Labs:    CBC/Anemia Profile:  Recent Labs   Lab 12/04/24  0222 12/05/24  0357   WBC 8.86 7.95   HGB 7.5* 7.0*   HCT 23.9* 22.8*   PLT 44* 37*   MCV 78* 79*   RDW 23.9* 24.1*       "  Chemistries:  Recent Labs   Lab 12/04/24  0952 12/04/24  1435 12/04/24  1608 12/04/24  2223 12/05/24  0357 12/05/24  0953   NA  --  133*  --  135*  135* 135*  135*  134*  --    K  --  3.6  --  3.7  3.7 3.6  3.6  3.6  --    CL  --  108  --  109  109 107  107  108  --    CO2  --  19*  --  20*  20* 18*  18*  21*  --    BUN  --  8  --  10  10 9  9  9  --    CREATININE  --  1.9*  --  2.2*  2.2* 2.0*  2.0*  2.0*  --    CALCIUM  --  7.7*  --  7.9*  7.9* 7.8*  7.8*  7.7*  --    ALBUMIN 2.5* 2.4*  --  2.4*  2.4* 2.4*  2.4*  2.4* 2.3*   PROT 5.5*  --   --   --  5.4* 5.2*   BILITOT 3.7*  --   --   --  3.0* 3.0*   ALKPHOS 48  --   --   --  49 48   ALT 8*  --   --   --  11 10   AST 16  --   --   --  21 21   MG 1.8  --  2.2 2.3  2.3 2.1  2.1  2.1  --    PHOS  --  2.9  --  3.6  3.6 3.2  3.2  3.1  --        All pertinent labs within the past 24 hours have been reviewed.    Significant Imaging:  I have reviewed all pertinent imaging results/findings within the past 24 hours.    ABG  No results for input(s): "PH", "PO2", "PCO2", "HCO3", "BE" in the last 168 hours.  Assessment/Plan:     Neuro  Encephalopathy, metabolic  AAOx4  Will assess for signs of encephalopathy     Cardiac/Vascular  Hypotension  On going problem since admission, most likely multifactorial - component of liver dysfunction, HRS, sepsis on admission.  Continues to require levo, midodrine has been up titrated.   Random cortisol was 6   Cortisol sim test - results pending      Atrial fibrillation  Holding home Eliquis in the setting of recent low blood counts  Continue metoprolol 12.5    Renal/  Stage 3a chronic kidney disease  Nephrology following  Will try to remove more fluid  Strict intake and output  Renally dose all medications  Avoid nephrotoxic agents    JAE (acute kidney injury)  Baseline creatinine is 1.5. May require dialysis long term.   MAP goal 55-60  Nephrology following for dialysis  Will attempt to remove more fluid - " goal is net negative 1-2L    ID  Gram-negative bacteremia  Blood cultures from admission with B. Diminuta, micrococcus luteus, lysinobacillus species. Seen by ID previously who recommended stopping antibiotics due to concern these were contaminants. Repeat blood cultures have been no growth. Has since been transferred to ICU with increased pressor requirement. Blood cultures repeated on 11/24 are no growth x 24 hours.   11/29 Switched from zosyn to meropenem due to concern of thrombocytopenia.   Continue meropenem.  Tentative end date for abx 12/8    Severe sepsis  This patient does have evidence of infective focus  My overall impression is sepsis.  Source: Abdominal  Antibiotics given-   Antibiotics (72h ago, onward)      Start     Stop Route Frequency Ordered    12/02/24 1800  meropenem injection 1 g         -- IV Every 12 hours (non-standard times) 12/02/24 1517          Organ dysfunction indicated by Acute kidney injury    Fluid challenge Contraindicated- Fluid bolus is contraindicated in this patient due to End Stage Liver Disease     Post- resuscitation assessment No - Post resuscitation assessment not needed     Source control achieved by: antibiotics  12/8 should be final day for abx    Hematology  Thrombocytopenia  Daily CBC  Transfuse for PLT<10k, or PLT<50K with active bleeding  Monitor for signs and symptoms of bleeding    Coagulopathy  End Stage Liver Disease precipitated coagulopathy  Heparin d/c d/t thrombocytopenia    Endocrine  Hyponatremia  Likely dilutional secondary to end stage liver disease and HRS.   CTM, correct if appropriate    GI  * Decompensated cirrhosis  MELD 3.0: 32 at 11/29/2024  9:57 PM  MELD-Na: 32 at 11/29/2024  9:57 PM  Calculated from:  Serum Creatinine: On dialysis. Using the maximum value.  Serum Sodium: 132 mmol/L at 11/29/2024  9:57 PM  Total Bilirubin: 2.9 mg/dL at 11/29/2024 10:34 AM  Serum Albumin: 2.8 g/dL at 11/29/2024  9:57 PM  INR(ratio): 2 at 11/27/2024  2:09 AM  Age  at listing (hypothetical): 69 years  Sex: Male at 11/29/2024  9:57 PM    Hepatology following, pt not transplant eligible at this time.       Abdominal pain  Patient developed abdominal pain 2/2 constipation last week and NGT was placed. 11/30 NGT was removed. The day after the NGT was removed he developed constipation and dilated bowel loops on AXR.  Some BM after brown bomb enema.   Continue to monitor       Critical Care Daily Checklist:    A: Awake: RASS Goal/Actual Goal: RASS Goal: 0-->alert and calm  Actual:     B: Spontaneous Breathing Trial Performed?     C: SAT & SBT Coordinated?                      D: Delirium: CAM-ICU Overall CAM-ICU: Negative   E: Early Mobility Performed? Yes   F: Feeding Goal: Goals: to meet % of EEN/EPN by next RD f/u  Status: Nutrition Goal Status: goal not met   Current Diet Order   Procedures    Diet Renal Standard Tray     Order Specific Question:   Tray type:     Answer:   Standard Tray      AS: Analgesia/Sedation NA   T: Thromboembolic Prophylaxis NO   H: HOB > 300 Yes   U: Stress Ulcer Prophylaxis (if needed) Yes   G: Glucose Control    B: Bowel Function Stool Occurrence: 1   I: Indwelling Catheter (Lines & Peacock) Necessity Trialysis, PIV, peacock   D: De-escalation of Antimicrobials/Pharmacotherapies Meropenem    Plan for the day/ETD Fluid removal    Code Status:  Family/Goals of Care: Full Code       Critical secondary to Patient has a condition that poses threat to life and bodily function: Acute Renal Failure      Critical care was time spent personally by me on the following activities: development of treatment plan with patient or surrogate and bedside caregivers, discussions with consultants, evaluation of patient's response to treatment, examination of patient, ordering and performing treatments and interventions, ordering and review of laboratory studies, ordering and review of radiographic studies, pulse oximetry, re-evaluation of patient's condition. This  critical care time did not overlap with that of any other provider or involve time for any procedures.     Rhona Lewis,   Critical Care Medicine  [facility] - Medical ICU

## 2024-12-05 NOTE — PROGRESS NOTES
12/05/24 0744   Treatment   Treatment Type SLED   Treatment Status Discontinued treatment;Blood returned   Dialysis Machine Number [id number]   Dialyzer Time (hours) 12.08   BVP (Liters) 50 L   Solutions Labeled and Current  Yes   Access Temporary Cath;Right;IJ   Catheter Dressing Intact  Yes   Alarms Engaged Yes   CRRT Comments Tx completed.

## 2024-12-05 NOTE — PROGRESS NOTES
12/04/24 1937   Treatment   Treatment Type SCUF   Treatment Status Restart   Dialysis Machine Number [id number]   Dialyzer Time (hours) 0   BVP (Liters) 0 L   Solutions Labeled and Current  Yes   Access Temporary Cath   Catheter Dressing Intact  Yes   Alarms Engaged Yes   CRRT Comments crrt restart   Prescription   Time (Hours) 12  (8 hr scuf /4 hr sled)   Dialysate K + (mEq/L) 4   Dialysate CA + (mEq/L) 2.25   Dialysate HCO3 - (Bicarb) (mEq/L) 30   Dialysate Na + (mEq/L) 140   Cartridge Type Other  (300)   Dialysate Flow Rate (mL/min) 0(seq)   UF Goal Rate 350 mL/hr   CRRT Hourly Documentation   Blood Flow (mL/min) 200   UF Rate 350 cc/hr   Arterial Pressure (mmHg) 40 mmHg   Venous Pressure (mmHg) 60 mmHg   Effluent Pressure (EP) (mmHg) 10 mmHg   Total UF (Hourly Cleared) (mL) 0     Received report from primary rn, 8 scuf tx initiated per md orders, accessed via right ij, flush well, lines secured

## 2024-12-05 NOTE — PT/OT/SLP PROGRESS
"Physical Therapy Missed Treatment Note      Patient Name:  Juan Carlos Yoo .   MRN:  [id number]  Admitting Diagnosis:  Decompensated cirrhosis   Recent Surgery: * No surgery found *    Admit Date: 11/20/2024  Length of Stay: 15 days    Patient not seen today due to  (pt with MAP below goal of 55-60 (ranging from 49-53) and pt reporting "not a good morning" and requesting to rest this AM). Juan Carlos Yoo Sr.'s plan of care and PT goals reviewed on this date and remain appropriate. Will follow-up for progressive mobility pending continued medical stability and patient participation.    12/5/2024    "

## 2024-12-05 NOTE — SUBJECTIVE & OBJECTIVE
Interval History/Significant Events: NAEON. Patient reports not sleeping well due to dialysis machines. Was able to have some bowel movements.     Denies HA, fevers, chills, chest pain, palpitations, SOB, N/V.     Review of Systems  Objective:     Vital Signs (Most Recent):  Temp: 98.5 °F (36.9 °C) (12/05/24 1105)  Pulse: 83 (12/05/24 1105)  Resp: 15 (12/05/24 1105)  BP:  (Brittany present) (12/05/24 1105)  SpO2: 95 % (12/05/24 1105) Vital Signs (24h Range):  Temp:  [97.6 °F (36.4 °C)-98.6 °F (37 °C)] 98.5 °F (36.9 °C)  Pulse:  [77-94] 83  Resp:  [11-22] 15  SpO2:  [92 %-98 %] 95 %  BP: (103-124)/(35-40) 103/35  Arterial Line BP: (100-127)/(35-45) 115/45   Weight: (!) 149 kg (328 lb 7.8 oz)  Body mass index is 43.34 kg/m².      Intake/Output Summary (Last 24 hours) at 12/5/2024 1251  Last data filed at 12/5/2024 1100  Gross per 24 hour   Intake 3887.18 ml   Output 5537 ml   Net -1649.82 ml          Physical Exam  Vitals and nursing note reviewed.   Constitutional:       General: He is awake. He is not in acute distress.     Appearance: He is obese. He is ill-appearing. He is not toxic-appearing.   HENT:      Head: Normocephalic and atraumatic.   Eyes:      General: No scleral icterus.     Extraocular Movements: Extraocular movements intact.      Conjunctiva/sclera: Conjunctivae normal.   Cardiovascular:      Rate and Rhythm: Normal rate. Rhythm irregular.   Pulmonary:      Effort: Pulmonary effort is normal. No respiratory distress.   Abdominal:      General: There is distension.      Palpations: Abdomen is soft.      Tenderness: There is no abdominal tenderness. There is no guarding or rebound.   Musculoskeletal:         General: No swelling or tenderness.      Right lower leg: Edema present.      Left lower leg: Edema present.      Comments: 2+ pitting edema in bilateral lower extremities   Skin:     General: Skin is warm.      Coloration: Skin is not jaundiced.      Findings: Bruising present.   Neurological:       Mental Status: He is alert and oriented to person, place, and time.      Motor: No weakness.            Vents:     Lines/Drains/Airways       Central Venous Catheter Line  Duration             Trialysis (Dialysis) Catheter 11/24/24 0441 right internal jugular 11 days              Drain  Duration                  Urethral Catheter 12/03/24 1607 Silicone 16 Fr. 1 day              Arterial Line  Duration             Arterial Line 11/24/24 0317 Right Radial 11 days              Peripheral Intravenous Line  Duration                  Peripheral IV - Single Lumen 11/27/24 1101 20 G Anterior;Left Forearm 8 days                  Significant Labs:    CBC/Anemia Profile:  Recent Labs   Lab 12/04/24  0222 12/05/24  0357   WBC 8.86 7.95   HGB 7.5* 7.0*   HCT 23.9* 22.8*   PLT 44* 37*   MCV 78* 79*   RDW 23.9* 24.1*        Chemistries:  Recent Labs   Lab 12/04/24  0952 12/04/24  1435 12/04/24  1608 12/04/24  2223 12/05/24  0357 12/05/24  0953   NA  --  133*  --  135*  135* 135*  135*  134*  --    K  --  3.6  --  3.7  3.7 3.6  3.6  3.6  --    CL  --  108  --  109  109 107  107  108  --    CO2  --  19*  --  20*  20* 18*  18*  21*  --    BUN  --  8  --  10  10 9  9  9  --    CREATININE  --  1.9*  --  2.2*  2.2* 2.0*  2.0*  2.0*  --    CALCIUM  --  7.7*  --  7.9*  7.9* 7.8*  7.8*  7.7*  --    ALBUMIN 2.5* 2.4*  --  2.4*  2.4* 2.4*  2.4*  2.4* 2.3*   PROT 5.5*  --   --   --  5.4* 5.2*   BILITOT 3.7*  --   --   --  3.0* 3.0*   ALKPHOS 48  --   --   --  49 48   ALT 8*  --   --   --  11 10   AST 16  --   --   --  21 21   MG 1.8  --  2.2 2.3  2.3 2.1  2.1  2.1  --    PHOS  --  2.9  --  3.6  3.6 3.2  3.2  3.1  --        All pertinent labs within the past 24 hours have been reviewed.    Significant Imaging:  I have reviewed all pertinent imaging results/findings within the past 24 hours.

## 2024-12-05 NOTE — ASSESSMENT & PLAN NOTE
On going problem since admission, most likely multifactorial - component of liver dysfunction, HRS, sepsis on admission.  Continues to require levo, midodrine has been up titrated.   Random cortisol was 6   Cortisol sim test - results pending

## 2024-12-05 NOTE — PROGRESS NOTES
12/05/24 1620   Treatment   Treatment Type SLED   Treatment Status Restart   Dialysis Machine Number [id number]   Dialyzer Time (hours) 0   BVP (Liters) 0 L   Solutions Labeled and Current  Yes   Access Temporary Cath;Right;IJ   Catheter Dressing Intact  Yes   Alarms Engaged Yes   CRRT Comments CRRT tx restarted per MD order.     CRRT tx restarted aseptically.  Order and machine settings verified.  Report given to the primary RN.

## 2024-12-05 NOTE — PROGRESS NOTES
[facility]  Nephrology  Progress Note    Patient Name: Juan Carlos Yoo Sr.  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 Recent admission 10/4 at [facility] for decompensated hepatic cirrhosis where he was discharged with oral diuretic regimen including furosemide 60 mg BID and metolazone 2.5 mg daily, low salt diet with fluid restriction. It is unclear if patient is adherent to these medications or lifestyle modifications. W/u notable for anemia with hb 6.7 s/p 1 unit pRBC 11/20 and JAE on CKD w elevated BUN 49/creatinine 5.9, hyperkalemia (K 6.1>5.1 after shifting), bicarb 18, elevated lactate up to 3.5. Nephrology consulted for JAE with c/o HRS    Interval History:     Status post CRT, 8 hrs of SCUF and 4 hour of SLED, 5 L of fluid removed, urine output 85 mL, map ranges from (65 and 73), on pressors 0.06, serum creatinine stable around 2.2, blood pressure 124/40. Plan for 6 hrs SLED  tonight     Review of patient's allergies indicates:  No Known Allergies  Current Facility-Administered Medications   Medication Frequency    0.9%  NaCl infusion (CRRT USE ONLY) Continuous    albuterol-ipratropium 2.5 mg-0.5 mg/3 mL nebulizer solution 3 mL Q6H PRN    aluminum-magnesium hydroxide-simethicone 200-200-20 mg/5 mL suspension 30 mL QID PRN    dextrose 10% bolus 125 mL 125 mL PRN    dextrose 10% bolus 250 mL 250 mL PRN    glucagon (human recombinant) injection 1 mg PRN    glucose chewable tablet 16 g PRN    glucose  chewable tablet 24 g PRN    [START ON 12/6/2024] hydrocortisone tablet 15 mg Before breakfast    And    hydrocortisone tablet 10 mg Daily    lactulose 20 gram/30 mL solution Soln 30 g TID    magnesium sulfate 2g in water 50mL IVPB (premix) PRN    melatonin tablet 6 mg Nightly PRN    meropenem injection 1 g Q12H    metoprolol tartrate (LOPRESSOR) split tablet 12.5 mg BID    midodrine tablet 20 mg Q8H    naloxone 0.4 mg/mL injection 0.02 mg PRN    NORepinephrine 4 mg in sodium chloride 0.9% 250 mL infusion (premix) Continuous    ondansetron injection 4 mg Q8H PRN    pantoprazole EC tablet 40 mg Daily    simethicone chewable tablet 80 mg QID PRN    sodium chloride 0.9% flush 10 mL Q12H PRN    sodium chloride 0.9% flush 10 mL PRN    sodium phosphate 20.01 mmol in D5W 250 mL IVPB PRN    sodium phosphate 30 mmol in D5W 250 mL IVPB PRN    sodium phosphate 39.99 mmol in D5W 250 mL IVPB PRN    tamsulosin 24 hr capsule 0.4 mg QHS       Objective:     Vital Signs (Most Recent):  Temp: 98.7 °F (37.1 °C) (12/05/24 1505)  Pulse: 83 (12/05/24 1600)  Resp: 14 (12/05/24 1600)  BP:  ([place] present) (12/05/24 1600)  SpO2: 95 % (12/05/24 1600) Vital Signs (24h Range):  Temp:  [97.6 °F (36.4 °C)-98.7 °F (37.1 °C)] 98.7 °F (37.1 °C)  Pulse:  [76-93] 83  Resp:  [11-22] 14  SpO2:  [92 %-97 %] 95 %  BP: (103-124)/(35-40) 103/35  Arterial Line BP: (100-124)/(35-45) 114/42     Weight: (!) 149 kg (328 lb 7.8 oz) (12/02/24 0400)  Body mass index is 43.34 kg/m².  Body surface area is 2.77 meters squared.    I/O last 3 completed shifts:  In: 7335 [P.O.:720; I.V.:6364.9; IV Piggyback:250.2]  Out: 9020 [Urine:335; Other:8685]     Physical Exam  Constitutional:       General: He is not in acute distress.     Appearance: He is ill-appearing. He is not toxic-appearing.   HENT:      Head: Normocephalic and atraumatic.   Eyes:      Extraocular Movements: Extraocular movements intact.      Pupils: Pupils are equal, round, and reactive to light.    Cardiovascular:      Rate and Rhythm: Normal rate.   Pulmonary:      Effort: No respiratory distress.      Breath sounds: No wheezing.   Abdominal:      General: There is distension.      Tenderness: There is no abdominal tenderness. There is no guarding.   Musculoskeletal:      Right lower leg: Edema present.      Left lower leg: Edema present.   Skin:     Coloration: Skin is pale. Skin is not jaundiced.   Neurological:      Mental Status: He is oriented to person, place, and time.          Significant Labs:  CBC:   Recent Labs   Lab 12/05/24  0357   WBC 7.95   RBC 2.90*   HGB 7.0*   HCT 22.8*   PLT 37*   MCV 79*   MCH 24.1*   MCHC 30.7*     CMP:   Recent Labs   Lab 12/05/24  0953 12/05/24  1402   GLU  --  107   CALCIUM  --  7.8*   ALBUMIN 2.3* 2.3*   PROT 5.2*  --    NA  --  135*   K  --  3.6   CO2  --  22*   CL  --  109   BUN  --  8   CREATININE  --  1.8*   ALKPHOS 48  --    ALT 10  --    AST 21  --    BILITOT 3.0*  --      LFTs:   Recent Labs   Lab 12/05/24  0953 12/05/24  1402   ALT 10  --    AST 21  --    ALKPHOS 48  --    BILITOT 3.0*  --    PROT 5.2*  --    ALBUMIN 2.3* 2.3*       Assessment/Plan:     Renal/  JAE (acute kidney injury)  JAE is likely due to pre-renal azotemia due to intravascular volume depletion secondary to decompensated hepatic cirrhosis with volume overload and acute tubular necrosis caused by hemodynamic instability, renal hypoperfusion, severe sepsis with GNR bacteremia. Initial presentation concerning for hepatorenal syndrome, transferred to MICU for vasopressors without success in reaching MAP goal 85-90 for treatment of HRS. Currently, patient with oligouric JAE more contributed by ATN as above.     Recommendations:    -Plan for 6 hrs of SLED tonight for removal of uremic toxins and volume control -400cc/hr goal is to remove 1L  -Still of pressors. Recommend goals of care discussion regarding current prognosis and liver transplant candidacy   -Avoid nephrotoxins and renally  dose meds for GFR listed above  -Monitor urine output, serial BMP, and adjust therapy as needed  -Hemodialysis consent obtained & placed in patient chart        Thank you for your consult. I will follow-up with patient. Please contact us if you have any additional questions.    Irma Hernandez MD  Nephrology  [facility]    ATTENDING PHYSICIAN ATTESTATION  I have personally verified the history and examined the patient. I thoroughly reviewed the demographic, clinical, laboratorial and imaging information available in medical records. I agree with the assessment and recommendations provided by the subspecialty resident who was under my supervision.

## 2024-12-05 NOTE — PROGRESS NOTES
Pal care will sign off. Please re-consult if needed. Pal care can follow pt outpt virtual visit. Please re-consult if plan of care changes.     Mimi SANTAMARIA, APRN, AGCNS

## 2024-12-05 NOTE — PROGRESS NOTES
12/05/24 0329   Treatment   Treatment Type SLED   Treatment Status Order change;Daily equipment check   Dialysis Machine Number [id number]   Dialyzer Time (hours) 8   BVP (Liters) 0 L   Solutions Labeled and Current  Yes   Access Temporary Cath   Catheter Dressing Intact  Yes   Alarms Engaged Yes   CRRT Comments order change, daily equipment check   CRRT Hourly Documentation   Blood Flow (mL/min) 200   UF Rate 400 cc/hr   Arterial Pressure (mmHg) 50 mmHg   Venous Pressure (mmHg) 70 mmHg   Effluent Pressure (EP) (mmHg) 10 mmHg   Total UF (Hourly Cleared) (mL) 548     Equipment check done, lines secured, order change, 4 hr sled started

## 2024-12-05 NOTE — PLAN OF CARE
Problem: Adult Inpatient Plan of Care  Goal: Plan of Care Review  Outcome: Progressing  Goal: Patient-Specific Goal (Individualized)  Outcome: Progressing  Goal: Absence of Hospital-Acquired Illness or Injury  Outcome: Progressing  Goal: Optimal Comfort and Wellbeing  Outcome: Progressing  Goal: Readiness for Transition of Care  Outcome: Progressing     Problem: Infection  Goal: Absence of Infection Signs and Symptoms  Outcome: Progressing     Problem: Sepsis/Septic Shock  Goal: Optimal Coping  Outcome: Progressing  Goal: Absence of Bleeding  Outcome: Progressing  Goal: Blood Glucose Level Within Targeted Range  Outcome: Progressing  Goal: Absence of Infection Signs and Symptoms  Outcome: Progressing  Goal: Optimal Nutrition Intake  Outcome: Progressing     Problem: Acute Kidney Injury/Impairment  Goal: Fluid and Electrolyte Balance  Outcome: Progressing  Goal: Improved Oral Intake  Outcome: Progressing  Goal: Effective Renal Function  Outcome: Progressing     Problem: Wound  Goal: Optimal Coping  Outcome: Progressing  Goal: Optimal Functional Ability  Outcome: Progressing  Goal: Absence of Infection Signs and Symptoms  Outcome: Progressing  Goal: Improved Oral Intake  Outcome: Progressing  Goal: Optimal Pain Control and Function  Outcome: Progressing  Goal: Skin Health and Integrity  Outcome: Progressing  Goal: Optimal Wound Healing  Outcome: Progressing     Problem: Bariatric Environmental Safety  Goal: Safety Maintained with Care  Outcome: Progressing     Problem: Skin Injury Risk Increased  Goal: Skin Health and Integrity  Outcome: Progressing     Problem: CRRT (Continuous Renal Replacement Therapy)  Goal: Safe, Effective Therapy Delivery  Outcome: Progressing  Goal: Hemodynamic Stability  Outcome: Progressing  Goal: Body Temperature Maintained in Desired Range  Outcome: Progressing  Goal: Absence of Infection Signs and Symptoms  Outcome: Progressing     Problem: Fall Injury Risk  Goal: Absence of Fall and  Fall-Related Injury  Outcome: Progressing     Problem: Coping Ineffective  Goal: Effective Coping  Outcome: Progressing

## 2024-12-05 NOTE — SUBJECTIVE & OBJECTIVE
Interval History:     Status post CRT, 8 hrs of SCUF and 4 hour of SLED, 5 L of fluid removed, urine output 85 mL, map ranges from (65 and 73), on pressors 0.06, serum creatinine stable around 2.2, blood pressure 124/40.     Review of patient's allergies indicates:  No Known Allergies  Current Facility-Administered Medications   Medication Frequency    0.9%  NaCl infusion (CRRT USE ONLY) Continuous    albuterol-ipratropium 2.5 mg-0.5 mg/3 mL nebulizer solution 3 mL Q6H PRN    aluminum-magnesium hydroxide-simethicone 200-200-20 mg/5 mL suspension 30 mL QID PRN    dextrose 10% bolus 125 mL 125 mL PRN    dextrose 10% bolus 250 mL 250 mL PRN    glucagon (human recombinant) injection 1 mg PRN    glucose chewable tablet 16 g PRN    glucose chewable tablet 24 g PRN    [START ON 12/6/2024] hydrocortisone tablet 15 mg Before breakfast    And    hydrocortisone tablet 10 mg Daily    lactulose 20 gram/30 mL solution Soln 30 g TID    magnesium sulfate 2g in water 50mL IVPB (premix) PRN    melatonin tablet 6 mg Nightly PRN    meropenem injection 1 g Q12H    metoprolol tartrate (LOPRESSOR) split tablet 12.5 mg BID    midodrine tablet 20 mg Q8H    naloxone 0.4 mg/mL injection 0.02 mg PRN    NORepinephrine 4 mg in sodium chloride 0.9% 250 mL infusion (premix) Continuous    ondansetron injection 4 mg Q8H PRN    pantoprazole EC tablet 40 mg Daily    simethicone chewable tablet 80 mg QID PRN    sodium chloride 0.9% flush 10 mL Q12H PRN    sodium chloride 0.9% flush 10 mL PRN    sodium phosphate 20.01 mmol in D5W 250 mL IVPB PRN    sodium phosphate 30 mmol in D5W 250 mL IVPB PRN    sodium phosphate 39.99 mmol in D5W 250 mL IVPB PRN    tamsulosin 24 hr capsule 0.4 mg QHS       Objective:     Vital Signs (Most Recent):  Temp: 98.7 °F (37.1 °C) (12/05/24 1505)  Pulse: 83 (12/05/24 1600)  Resp: 14 (12/05/24 1600)  BP:  ([place] present) (12/05/24 1600)  SpO2: 95 % (12/05/24 1600) Vital Signs (24h Range):  Temp:  [97.6 °F (36.4 °C)-98.7 °F  (37.1 °C)] 98.7 °F (37.1 °C)  Pulse:  [76-93] 83  Resp:  [11-22] 14  SpO2:  [92 %-97 %] 95 %  BP: (103-124)/(35-40) 103/35  Arterial Line BP: (100-124)/(35-45) 114/42     Weight: (!) 149 kg (328 lb 7.8 oz) (12/02/24 0400)  Body mass index is 43.34 kg/m².  Body surface area is 2.77 meters squared.    I/O last 3 completed shifts:  In: 7335 [P.O.:720; I.V.:6364.9; IV Piggyback:250.2]  Out: 9020 [Urine:335; Other:8685]     Physical Exam  Constitutional:       General: He is not in acute distress.     Appearance: He is ill-appearing. He is not toxic-appearing.   HENT:      Head: Normocephalic and atraumatic.   Eyes:      Extraocular Movements: Extraocular movements intact.      Pupils: Pupils are equal, round, and reactive to light.   Cardiovascular:      Rate and Rhythm: Normal rate.   Pulmonary:      Effort: No respiratory distress.      Breath sounds: No wheezing.   Abdominal:      General: There is distension.      Tenderness: There is no abdominal tenderness. There is no guarding.   Musculoskeletal:      Right lower leg: Edema present.      Left lower leg: Edema present.   Skin:     Coloration: Skin is pale. Skin is not jaundiced.   Neurological:      Mental Status: He is oriented to person, place, and time.          Significant Labs:  CBC:   Recent Labs   Lab 12/05/24  0357   WBC 7.95   RBC 2.90*   HGB 7.0*   HCT 22.8*   PLT 37*   MCV 79*   MCH 24.1*   MCHC 30.7*     CMP:   Recent Labs   Lab 12/05/24  0953 12/05/24  1402   GLU  --  107   CALCIUM  --  7.8*   ALBUMIN 2.3* 2.3*   PROT 5.2*  --    NA  --  135*   K  --  3.6   CO2  --  22*   CL  --  109   BUN  --  8   CREATININE  --  1.8*   ALKPHOS 48  --    ALT 10  --    AST 21  --    BILITOT 3.0*  --      LFTs:   Recent Labs   Lab 12/05/24  0953 12/05/24  1402   ALT 10  --    AST 21  --    ALKPHOS 48  --    BILITOT 3.0*  --    PROT 5.2*  --    ALBUMIN 2.3* 2.3*

## 2024-12-05 NOTE — PLAN OF CARE
MICU DAILY GOALS     Family/Goals of care/Code Status   Code Status: Full Code    24H Vital Sign Range  Temp:  [97.6 °F (36.4 °C)-98.6 °F (37 °C)]   Pulse:  [80-94]   Resp:  [11-22]   BP: (120-124)/(40)   SpO2:  [93 %-98 %]   Arterial Line BP: (104-128)/(35-43)      Shift Events (include procedures and significant events)   No acute events throughout shift. 8 hours of SCUF and 4 hours of SLED going. Levo at 0.06 to maintain BP.     AWAKE RASS: Goal - RASS Goal: 0-->alert and calm  Actual - RASS (Pedraza Agitation-Sedation Scale): alert and calm    Restraint necessity: Not necessary   BREATHE SBT: Not intubated    Coordinate A & B, analgesics/sedatives Pain: managed   SAT: Not intubated   Delirium CAM-ICU: Overall CAM-ICU: Negative   Early(intubated/ Progressive (non-intubated) Mobility MOVE Screen (INTUBATED ONLY): Not intubated    Activity: Activity Management: Arm raise - L1, Rolling - L1   Feeding/Nutrition Diet order: Diet/Nutrition Received: low saturated fat/low cholesterol, Specialty Diet/Nutrition Received: renal diet   Thrombus DVT prophylaxis: VTE Core Measure: (SCDs) Sequential compression device initiated/maintained   HOB Elevation Head of Bed (HOB) Positioning: HOB at 30-45 degrees   Ulcer Prophylaxis GI: yes   Glucose control managed Glycemic Management: blood glucose monitored   Skin Skin assessment:     Sacrum intact/not altered? Yes  Heels intact/not altered? Yes  Surgical wound? No    CHECK ONE!   (no altered skin or altered skin) and sub boxes:  [] No Altered Skin Integrity Present    []Prevention Measures Documented    [x] Altered Skin Integrity Present or Discovered   [x] LDA present in EPIC, daily doc completed              [x] LDA added if not in EPIC (describe wound).                    When describing wound, do not stage, use descriptive words only.    [] Wound Image Taken (required on admit,                   transfer/discharge and every Tuesday)    Wound Care Consulted? Yes   Bowel  Function no issues    Indwelling Catheter Necessity [REMOVED]      Urethral Catheter 11/26/24 1738-Reason for Continuing Urinary Catheterization: Urinary retention       Urethral Catheter 12/03/24 1607 Silicone 16 Fr.-Reason for Continuing Urinary Catheterization: Critically ill in ICU and requiring hourly monitoring of intake/output  [REMOVED]      Urethral Catheter 11/20/24 1802 Double-lumen-Reason for Continuing Urinary Catheterization: Urinary retention    Trialysis (Dialysis) Catheter 11/24/24 0441 right internal jugular-Line Necessity Review: CRRT/HD  yes   De-escalation Antibiotics No        VS and assessment per flow sheet, patient progressing towards goals as tolerated, plan of care reviewed with Juan Carlos Yoo Sr, all concerns addressed, will continue to monitor.

## 2024-12-05 NOTE — ASSESSMENT & PLAN NOTE
JAE is likely due to pre-renal azotemia due to intravascular volume depletion secondary to decompensated hepatic cirrhosis with volume overload and acute tubular necrosis caused by hemodynamic instability, renal hypoperfusion, severe sepsis with GNR bacteremia. Initial presentation concerning for hepatorenal syndrome, transferred to MICU for vasopressors without success in reaching MAP goal 85-90 for treatment of HRS. Currently, patient with oligouric JAE more contributed by ATN as above.     Recommendations:    -Plan for 6 hrs of SLED tonight for removal of uremic toxins and volume control -400cc/hr goal is to remove 1L  -Still of pressors. Recommend goals of care discussion regarding current prognosis and liver transplant candidacy   -Avoid nephrotoxins and renally dose meds for GFR listed above  -Monitor urine output, serial BMP, and adjust therapy as needed  -Hemodialysis consent obtained & placed in patient chart

## 2024-12-05 NOTE — PT/OT/SLP PROGRESS
Occupational Therapy      Patient Name:  Juan Carlos Yoo .   MRN:  [id number]    Patient not seen today secondary to declining therapy stating he had a bad night. Furthermore, pt on 0.06 levo with soft BP with MAP in low 50s  . Will follow-up 12/6/24.    12/5/2024

## 2024-12-06 LAB
ACTH PLAS-MCNC: 14 PG/ML (ref 0–46)
ALBUMIN SERPL BCP-MCNC: 2.3 G/DL (ref 3.5–5.2)
ALP SERPL-CCNC: 53 U/L (ref 40–150)
ALT SERPL W/O P-5'-P-CCNC: 11 U/L (ref 10–44)
ANION GAP SERPL CALC-SCNC: 10 MMOL/L (ref 8–16)
ANION GAP SERPL CALC-SCNC: 7 MMOL/L (ref 8–16)
ANION GAP SERPL CALC-SCNC: 7 MMOL/L (ref 8–16)
ANION GAP SERPL CALC-SCNC: 8 MMOL/L (ref 8–16)
AST SERPL-CCNC: 19 U/L (ref 10–40)
BASOPHILS # BLD AUTO: 0.03 K/UL (ref 0–0.2)
BASOPHILS NFR BLD: 0.3 % (ref 0–1.9)
BILIRUB SERPL-MCNC: 3.2 MG/DL (ref 0.1–1)
BUN SERPL-MCNC: 5 MG/DL (ref 8–23)
BUN SERPL-MCNC: 7 MG/DL (ref 8–23)
BUN SERPL-MCNC: 7 MG/DL (ref 8–23)
BUN SERPL-MCNC: 8 MG/DL (ref 8–23)
CALCIUM SERPL-MCNC: 7.6 MG/DL (ref 8.7–10.5)
CALCIUM SERPL-MCNC: 7.8 MG/DL (ref 8.7–10.5)
CALCIUM SERPL-MCNC: 7.8 MG/DL (ref 8.7–10.5)
CALCIUM SERPL-MCNC: 8.1 MG/DL (ref 8.7–10.5)
CHLORIDE SERPL-SCNC: 106 MMOL/L (ref 95–110)
CHLORIDE SERPL-SCNC: 107 MMOL/L (ref 95–110)
CHLORIDE SERPL-SCNC: 108 MMOL/L (ref 95–110)
CHLORIDE SERPL-SCNC: 109 MMOL/L (ref 95–110)
CO2 SERPL-SCNC: 16 MMOL/L (ref 23–29)
CO2 SERPL-SCNC: 19 MMOL/L (ref 23–29)
CO2 SERPL-SCNC: 20 MMOL/L (ref 23–29)
CO2 SERPL-SCNC: 21 MMOL/L (ref 23–29)
CREAT SERPL-MCNC: 1.2 MG/DL (ref 0.5–1.4)
CREAT SERPL-MCNC: 1.7 MG/DL (ref 0.5–1.4)
CREAT SERPL-MCNC: 1.8 MG/DL (ref 0.5–1.4)
CREAT SERPL-MCNC: 2.1 MG/DL (ref 0.5–1.4)
DIFFERENTIAL METHOD BLD: ABNORMAL
EOSINOPHIL # BLD AUTO: 0 K/UL (ref 0–0.5)
EOSINOPHIL NFR BLD: 0.4 % (ref 0–8)
ERYTHROCYTE [DISTWIDTH] IN BLOOD BY AUTOMATED COUNT: 24.6 % (ref 11.5–14.5)
EST. GFR  (NO RACE VARIABLE): 33 ML/MIN/1.73 M^2
EST. GFR  (NO RACE VARIABLE): 39.7 ML/MIN/1.73 M^2
EST. GFR  (NO RACE VARIABLE): 42.6 ML/MIN/1.73 M^2
EST. GFR  (NO RACE VARIABLE): >60 ML/MIN/1.73 M^2
GLUCOSE SERPL-MCNC: 103 MG/DL (ref 70–110)
GLUCOSE SERPL-MCNC: 117 MG/DL (ref 70–110)
GLUCOSE SERPL-MCNC: 128 MG/DL (ref 70–110)
GLUCOSE SERPL-MCNC: 131 MG/DL (ref 70–110)
HCT VFR BLD AUTO: 23.5 % (ref 40–54)
HGB BLD-MCNC: 7.3 G/DL (ref 14–18)
IMM GRANULOCYTES # BLD AUTO: 0.05 K/UL (ref 0–0.04)
IMM GRANULOCYTES NFR BLD AUTO: 0.5 % (ref 0–0.5)
LYMPHOCYTES # BLD AUTO: 0.8 K/UL (ref 1–4.8)
LYMPHOCYTES NFR BLD: 7.5 % (ref 18–48)
MAGNESIUM SERPL-MCNC: 1.9 MG/DL (ref 1.6–2.6)
MAGNESIUM SERPL-MCNC: 2.1 MG/DL (ref 1.6–2.6)
MAGNESIUM SERPL-MCNC: 2.1 MG/DL (ref 1.6–2.6)
MCH RBC QN AUTO: 24.4 PG (ref 27–31)
MCHC RBC AUTO-ENTMCNC: 31.1 G/DL (ref 32–36)
MCV RBC AUTO: 79 FL (ref 82–98)
MONOCYTES # BLD AUTO: 1.1 K/UL (ref 0.3–1)
MONOCYTES NFR BLD: 10.6 % (ref 4–15)
NEUTROPHILS # BLD AUTO: 8.1 K/UL (ref 1.8–7.7)
NEUTROPHILS NFR BLD: 80.7 % (ref 38–73)
NRBC BLD-RTO: 0 /100 WBC
PHOSPHATE SERPL-MCNC: 2.1 MG/DL (ref 2.7–4.5)
PHOSPHATE SERPL-MCNC: 3.2 MG/DL (ref 2.7–4.5)
PHOSPHATE SERPL-MCNC: 3.7 MG/DL (ref 2.7–4.5)
PHOSPHATE SERPL-MCNC: 3.8 MG/DL (ref 2.7–4.5)
PLATELET # BLD AUTO: 44 K/UL (ref 150–450)
PMV BLD AUTO: ABNORMAL FL (ref 9.2–12.9)
POTASSIUM SERPL-SCNC: 3.3 MMOL/L (ref 3.5–5.1)
POTASSIUM SERPL-SCNC: 3.3 MMOL/L (ref 3.5–5.1)
POTASSIUM SERPL-SCNC: 3.4 MMOL/L (ref 3.5–5.1)
POTASSIUM SERPL-SCNC: 3.6 MMOL/L (ref 3.5–5.1)
PROT SERPL-MCNC: 5.4 G/DL (ref 6–8.4)
RBC # BLD AUTO: 2.99 M/UL (ref 4.6–6.2)
SODIUM SERPL-SCNC: 133 MMOL/L (ref 136–145)
SODIUM SERPL-SCNC: 134 MMOL/L (ref 136–145)
SODIUM SERPL-SCNC: 135 MMOL/L (ref 136–145)
SODIUM SERPL-SCNC: 136 MMOL/L (ref 136–145)
WBC # BLD AUTO: 10.04 K/UL (ref 3.9–12.7)
WBC #/AREA STL HPF: NORMAL /[HPF]

## 2024-12-06 PROCEDURE — 87045 FECES CULTURE AEROBIC BACT: CPT

## 2024-12-06 PROCEDURE — 94761 N-INVAS EAR/PLS OXIMETRY MLT: CPT

## 2024-12-06 PROCEDURE — 25000003 PHARM REV CODE 250

## 2024-12-06 PROCEDURE — 80100008 HC CRRT DAILY MAINTENANCE

## 2024-12-06 PROCEDURE — 25000003 PHARM REV CODE 250: Performed by: INTERNAL MEDICINE

## 2024-12-06 PROCEDURE — 89055 LEUKOCYTE ASSESSMENT FECAL: CPT

## 2024-12-06 PROCEDURE — 99232 SBSQ HOSP IP/OBS MODERATE 35: CPT | Mod: ,,, | Performed by: INTERNAL MEDICINE

## 2024-12-06 PROCEDURE — 25000003 PHARM REV CODE 250: Performed by: STUDENT IN AN ORGANIZED HEALTH CARE EDUCATION/TRAINING PROGRAM

## 2024-12-06 PROCEDURE — 84100 ASSAY OF PHOSPHORUS: CPT

## 2024-12-06 PROCEDURE — 87427 SHIGA-LIKE TOXIN AG IA: CPT

## 2024-12-06 PROCEDURE — 85025 COMPLETE CBC W/AUTO DIFF WBC: CPT

## 2024-12-06 PROCEDURE — 87449 NOS EACH ORGANISM AG IA: CPT

## 2024-12-06 PROCEDURE — 63600175 PHARM REV CODE 636 W HCPCS: Performed by: INTERNAL MEDICINE

## 2024-12-06 PROCEDURE — 97530 THERAPEUTIC ACTIVITIES: CPT

## 2024-12-06 PROCEDURE — 87046 STOOL CULTR AEROBIC BACT EA: CPT

## 2024-12-06 PROCEDURE — 97110 THERAPEUTIC EXERCISES: CPT

## 2024-12-06 PROCEDURE — 83735 ASSAY OF MAGNESIUM: CPT | Mod: 91 | Performed by: STUDENT IN AN ORGANIZED HEALTH CARE EDUCATION/TRAINING PROGRAM

## 2024-12-06 PROCEDURE — 80069 RENAL FUNCTION PANEL: CPT | Mod: 91 | Performed by: STUDENT IN AN ORGANIZED HEALTH CARE EDUCATION/TRAINING PROGRAM

## 2024-12-06 PROCEDURE — 90945 DIALYSIS ONE EVALUATION: CPT

## 2024-12-06 PROCEDURE — 20000000 HC ICU ROOM

## 2024-12-06 PROCEDURE — 80053 COMPREHEN METABOLIC PANEL: CPT

## 2024-12-06 PROCEDURE — 97535 SELF CARE MNGMENT TRAINING: CPT

## 2024-12-06 PROCEDURE — 99291 CRITICAL CARE FIRST HOUR: CPT | Mod: ,,, | Performed by: INTERNAL MEDICINE

## 2024-12-06 RX ORDER — HYDROCODONE BITARTRATE AND ACETAMINOPHEN 500; 5 MG/1; MG/1
TABLET ORAL CONTINUOUS
Status: ACTIVE | OUTPATIENT
Start: 2024-12-06 | End: 2024-12-07

## 2024-12-06 RX ORDER — MAGNESIUM SULFATE HEPTAHYDRATE 40 MG/ML
2 INJECTION, SOLUTION INTRAVENOUS
Status: DISPENSED | OUTPATIENT
Start: 2024-12-06 | End: 2024-12-07

## 2024-12-06 RX ADMIN — MIDODRINE HYDROCHLORIDE 20 MG: 5 TABLET ORAL at 09:12

## 2024-12-06 RX ADMIN — POTASSIUM BICARBONATE 40 MEQ: 391 TABLET, EFFERVESCENT ORAL at 05:12

## 2024-12-06 RX ADMIN — SODIUM CHLORIDE: 9 INJECTION, SOLUTION INTRAVENOUS at 02:12

## 2024-12-06 RX ADMIN — TAMSULOSIN HYDROCHLORIDE 0.4 MG: 0.4 CAPSULE ORAL at 09:12

## 2024-12-06 RX ADMIN — HYDROCORTISONE 15 MG: 5 TABLET ORAL at 05:12

## 2024-12-06 RX ADMIN — MIDODRINE HYDROCHLORIDE 20 MG: 5 TABLET ORAL at 05:12

## 2024-12-06 RX ADMIN — NOREPINEPHRINE BITARTRATE 0.05 MCG/KG/MIN: 16 SOLUTION INTRAVENOUS at 05:12

## 2024-12-06 RX ADMIN — SODIUM CHLORIDE: 9 INJECTION, SOLUTION INTRAVENOUS at 06:12

## 2024-12-06 RX ADMIN — MIDODRINE HYDROCHLORIDE 20 MG: 5 TABLET ORAL at 02:12

## 2024-12-06 RX ADMIN — PANTOPRAZOLE SODIUM 40 MG: 40 TABLET, DELAYED RELEASE ORAL at 07:12

## 2024-12-06 RX ADMIN — HYDROCORTISONE 10 MG: 5 TABLET ORAL at 02:12

## 2024-12-06 RX ADMIN — SODIUM PHOSPHATE, MONOBASIC, MONOHYDRATE AND SODIUM PHOSPHATE, DIBASIC, ANHYDROUS 30 MMOL: 142; 276 INJECTION, SOLUTION INTRAVENOUS at 02:12

## 2024-12-06 RX ADMIN — POTASSIUM BICARBONATE 40 MEQ: 391 TABLET, EFFERVESCENT ORAL at 07:12

## 2024-12-06 RX ADMIN — LACTULOSE 30 G: 20 SOLUTION ORAL at 02:12

## 2024-12-06 RX ADMIN — METOPROLOL TARTRATE 12.5 MG: 25 TABLET, FILM COATED ORAL at 08:12

## 2024-12-06 RX ADMIN — LACTULOSE 30 G: 20 SOLUTION ORAL at 09:12

## 2024-12-06 RX ADMIN — MEROPENEM 1 G: 1 INJECTION, POWDER, FOR SOLUTION INTRAVENOUS at 05:12

## 2024-12-06 RX ADMIN — LACTULOSE 30 G: 20 SOLUTION ORAL at 08:12

## 2024-12-06 RX ADMIN — METOPROLOL TARTRATE 12.5 MG: 25 TABLET, FILM COATED ORAL at 09:12

## 2024-12-06 NOTE — PT/OT/SLP PROGRESS
Physical Therapy Co-Treatment    Patient Name:  Juan Carlos Yoo Sr.   MRN:  [id number]  Admitting Diagnosis:  Decompensated cirrhosis   Recent Surgery: * No surgery found *    Admit Date: 11/20/2024  Length of Stay: 16 days    Recommendations:     Discharge Recommendations: Moderate Intensity Therapy  Discharge Equipment Recommendations: wheelchair, hospital bed, bedside commode, bath bench, grab bar   Barriers to discharge: None    Appropriate transfer level with nursing staff: bed <> chair/bsc assist x 2    Plan:     During this hospitalization, patient to be seen 4 x/week to address the identified rehab impairments via gait training, therapeutic activities, therapeutic exercises, neuromuscular re-education and progress towards the established goals.  Plan of Care Expires:  12/27/24  Plan of Care Reviewed with: patient    Assessment     Juan Carlos Yoo Sr. is a 69 y.o. male admitted with a medical diagnosis of Decompensated cirrhosis. Pt tolerated treatment session well today. Goals of session were to increase functional strength and progress static/dynamic balance via standing trials. He showed good progress in these areas as seen by decreased assist needed for transfers as compared to previous sessions. However, further progression of mobility limited due to consistent hypotension with sitting and standing despite increase of levo from .03 to .04  by RN for mobility. Pt's MAP decreased to as low as 39 after transfer to bedside commode but pt asymptomatic throughout. Pt is still well below their baseline level of function and presents with deficits affecting mobility as per problem list below. Based on clinical presentation, co-morbidities, and today's performance, patient would benefit from skilled physical therapy services to progress functional transfers, improve musculoskeletal strength, as well as increase pt's cardiopulmonary endurance to maximize pt's independence and return to PLOF.    Problem List: weakness,  "impaired endurance, impaired self care skills, impaired functional mobility, gait instability, impaired balance, decreased upper extremity function, decreased lower extremity function, decreased safety awareness, pain, impaired coordination, impaired cardiopulmonary response to activity, impaired skin, edema.  Rehab Prognosis: Good; patient would benefit from acute skilled PT services to address these deficits and reach maximum level of function.      Goals:   Multidisciplinary Problems       Physical Therapy Goals          Problem: Physical Therapy    Goal Priority Disciplines Outcome Interventions   Physical Therapy Goal     PT, PT/OT Progressing    Description: Goals to be met by: 24     Patient will increase functional independence with mobility by performin. Supine to sit with Minimal Assistance  2. Sit to stand transfer with Stand By Assistance  3. Bed to chair transfer with Stand By Assistance using LRAD  4. Gait  x 150 feet with Stand By Assistance using No LRAD.                          Subjective     RN notified prior to session. No family/friends present upon PT entrance into room. Patient agreeable to PT evaluation.    Chief Complaint: "I have a lot of gas"  Patient/Family Comments/goals: get stronger  Pain/Comfort:  Pain Rating 1: 0/10  Pain Rating Post-Intervention 1: 0/10    Objective:     Additional staff present: OT; OT for cotx due to pt's multiple medical comorbidities and functional deficits req'ing two skilled therapists to appropriately progress pt's musculoskeletal strength, neuromuscular control, and endurance while taking into consideration severe medical acuity in the ICU    Patient found HOB elevated with: telemetry, blood pressure cuff, pulse ox (continuous), arterial line, Trialysis, peripheral IV, peacock catheter   Cognition:   Alert and Cooperative  Patient is oriented to Person, Place, Time, Situation  Command following: Follows multistep verbal commands  Fluency: " "clear/fluent  General Precautions: Standard, Cardiac fall   Orthopedic Precautions:N/A   Braces: N/A   Body mass index is 43.25 kg/m².  Oxygen Device: Room Air  Vitals: BP (!) 99/46   Pulse 81   Temp 97.8 °F (36.6 °C) (Oral)   Resp 14   Ht 6' 1" (1.854 m)   Wt (!) 148.7 kg (327 lb 13.2 oz)   SpO2 97%   BMI 43.25 kg/m²     Outcome Measures:  AM-PAC 6 CLICK MOBILITY  Turning over in bed (including adjusting bedclothes, sheets and blankets)?: 2  Sitting down on and standing up from a chair with arms (e.g., wheelchair, bedside commode, etc.): 3  Moving from lying on back to sitting on the side of the bed?: 2  Moving to and from a bed to a chair (including a wheelchair)?: 3  Need to walk in hospital room?: 3  Climbing 3-5 steps with a railing?: 1  Basic Mobility Total Score: 14     Functional Mobility:    Bed Mobility:   Scooting to HOB via supine bridge: total assistance and 2 persons   Supine to Sit: moderate assistance and 2 persons for LE management and for trunk management; to Rt side of bed  Scooting anteriorly to EOB to have both feet planted on floor: total assistance   Sit to Supine: moderate assistance and 2 persons for LE management and for trunk management; to Rt side of bed    Sitting Balance at Edge of Bed:  Static Sitting Balance: Good- : able to accept minimal resistance  Dynamic Sitting Balance: Good- : able to sit unsupported and weight shift across midline minimally  Assistance Level Required: Stand-by Assistance  Time: ~10 minutes  Postural deviations noted: slouched posture and rounded shoulders  Comments: Time EOB focused primarily on tolerance to upright positioning, cardiopulmonary response and endurance for activities, and strength of postural musculature to perform dynamic sitting to prepare for tasks in the home. Pt with MAP below goal of 55-60 (ranging 49-53) while EOB despite providing increased time and ankle pumps. Pt asymptomatic. RN increased levo from .03 to .04 and MAP increased " to 55-59.    Transfers:   Sit <> Stand Transfer: minimum assistance with hand-held assist. [id number] from EOB and [id number] from bedside commode  Bed <> Bedside commode Transfer Step Transfer technique: minimum assistance and of 2 persons with hand-held assist. BSC on the Lt. MAP decreasing as low as 39 after transfer and remained 50-55 throughout with pt asymptomatic..   Bedside Commode <> Bed Transfer Step Transfer technique: minimum assistance with hand-held assist. Bed  on the Rt. MAP decreased to 46 after transfer and recovered to within goals    Standing Balance:  Static Standing Balance: Fair- : requires minimal assistance or UE support in order to stand without LOB  Dynamic Standing Balance: Poor+ : able to stand with minimal assistance and reach ipsilaterally. Unable to weight shift.  Assistance Level Required: Minimal Assistance  Patient used: hand-held assist   Comments: Req'd min A to maintain balance, accept external perturbations and internal perturbations during standing wyatt-care. Pt asymptomatic but MAP below goal throughout standing balance      Gait:   Patient ambulated: ~4 side steps Lt to bedside commode + toileting + 4 ft fwd and 4 side steps Rt to bed   Patient required: minimum assistance  Patient used:  hand-held assist   Gait Pattern observed: reciprocal gait  Gait Deviation(s): decreased step length, wide base of support, decreased weight shift, decreased foot clearance, flexed posture, decreased osmar, and shuffle gait  Impairments due to: pain and decreased endurance  all lines remained intact throughout ambulation trial  Comments: Patient required cues for stride length and upright posture to increase independence and safety. Patient required cues ~ 25% of the time. Further ambulation or transfer to bedside chair deferred 2/2 asymptomatic hypotension    Education:  Time provided for education, counseling and discussion of health disposition in regards to patient's current status  All  questions answered within PT scope of practice and to patient's satisfaction  PT role in POC to address current functional deficits  Pt educated on proper body mechanics, safety techniques, and energy conservation with PT facilitation and cueing throughout session  Call nursing/pct to transfer to chair/use bathroom. Pt stated understanding.  Whiteboard updated with therapist name and pt's current mobility status documented above  Safe to perform step transfer to/from chair/bedside commode assist x 2 and HHA w/ nursing/PCT present  Importance of OOB tolerance prn hrs/day to improve lung ventilation and expansion as well as strengthen postural musculature    Patient left HOB elevated with all lines intact, call button in reach, and RN present.    Time Tracking:     PT Received On: 12/06/24  PT Start Time: 0840     PT Stop Time: 0903  PT Total Time (min): 23 min     Billable Minutes:   Therapeutic Activity 13 minutes and Therapeutic Exercise 10 minutes    Treatment Type: Treatment  PT/PTA: PT       12/6/2024

## 2024-12-06 NOTE — PROGRESS NOTES
[facility]  Nephrology  Progress Note    Patient Name: Juan Carlos Yoo Sr.  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 Recent admission 10/4 at [facility] for decompensated hepatic cirrhosis where he was discharged with oral diuretic regimen including furosemide 60 mg BID and metolazone 2.5 mg daily, low salt diet with fluid restriction. It is unclear if patient is adherent to these medications or lifestyle modifications. W/u notable for anemia with hb 6.7 s/p 1 unit pRBC 11/20 and JAE on CKD w elevated BUN 49/creatinine 5.9, hyperkalemia (K 6.1>5.1 after shifting), bicarb 18, elevated lactate up to 3.5. Nephrology consulted for JAE with c/o HRS    Interval History:     Status post CRT/sled for 8 hours, 2 L removed overnight, still on pressors, map ranges from 55 to 60 urine output 170 mL, plan for another session of sled tonight    Review of patient's allergies indicates:  No Known Allergies  Current Facility-Administered Medications   Medication Frequency    0.9%  NaCl infusion (CRRT USE ONLY) Continuous    albuterol-ipratropium 2.5 mg-0.5 mg/3 mL nebulizer solution 3 mL Q6H PRN    aluminum-magnesium hydroxide-simethicone 200-200-20 mg/5 mL suspension 30 mL QID PRN    dextrose 10% bolus 125 mL 125 mL PRN    dextrose 10% bolus 250 mL 250 mL PRN    glucagon (human recombinant) injection 1 mg PRN    glucose chewable tablet 16 g PRN    glucose chewable tablet 24 g PRN    hydrocortisone tablet 15 mg Before  breakfast    And    hydrocortisone tablet 10 mg Daily    lactulose 20 gram/30 mL solution Soln 30 g TID    magnesium sulfate 2g in water 50mL IVPB (premix) PRN    melatonin tablet 6 mg Nightly PRN    meropenem injection 1 g Q12H    metoprolol tartrate (LOPRESSOR) split tablet 12.5 mg BID    midodrine tablet 20 mg Q8H    naloxone 0.4 mg/mL injection 0.02 mg PRN    NORepinephrine 4 mg in sodium chloride 0.9% 250 mL infusion (premix) Continuous    ondansetron injection 4 mg Q8H PRN    pantoprazole EC tablet 40 mg Daily    simethicone chewable tablet 80 mg QID PRN    sodium chloride 0.9% flush 10 mL Q12H PRN    sodium chloride 0.9% flush 10 mL PRN    sodium phosphate 20.01 mmol in D5W 250 mL IVPB PRN    sodium phosphate 30 mmol in D5W 250 mL IVPB PRN    sodium phosphate 39.99 mmol in D5W 250 mL IVPB PRN    tamsulosin 24 hr capsule 0.4 mg QHS       Objective:     Vital Signs (Most Recent):  Temp: 97.8 °F (36.6 °C) (12/06/24 1105)  Pulse: 81 (12/06/24 1200)  Resp: 14 (12/06/24 1200)  BP: (!) 99/46 (12/06/24 0808)  SpO2: 97 % (12/06/24 1200) Vital Signs (24h Range):  Temp:  [97.7 °F (36.5 °C)-98.7 °F (37.1 °C)] 97.8 °F (36.6 °C)  Pulse:  [74-93] 81  Resp:  [12-30] 14  SpO2:  [86 %-100 %] 97 %  BP: ()/(36-46) 99/46  Arterial Line BP: ()/(37-49) 103/38     Weight: (!) 148.7 kg (327 lb 13.2 oz) (12/06/24 0301)  Body mass index is 43.25 kg/m².  Body surface area is 2.77 meters squared.    I/O last 3 completed shifts:  In: 5121.9 [I.V.:4872.4; IV Piggyback:249.5]  Out: 7474 [Urine:255; Other:7219]     Physical Exam  Constitutional:       General: He is not in acute distress.     Appearance: He is ill-appearing. He is not toxic-appearing.   HENT:      Head: Normocephalic and atraumatic.   Eyes:      Extraocular Movements: Extraocular movements intact.      Pupils: Pupils are equal, round, and reactive to light.   Cardiovascular:      Rate and Rhythm: Normal rate.   Pulmonary:      Effort: No respiratory distress.       Breath sounds: No wheezing or rales.   Abdominal:      General: There is distension.      Tenderness: There is no abdominal tenderness. There is no guarding.   Musculoskeletal:         General: Swelling present.      Right lower leg: Edema present.      Left lower leg: Edema present.   Skin:     Coloration: Skin is pale. Skin is not jaundiced.   Neurological:      Mental Status: He is oriented to person, place, and time.   Psychiatric:         Behavior: Behavior normal.          Significant Labs:  CBC:   Recent Labs   Lab 12/06/24  0345   WBC 10.04   RBC 2.99*   HGB 7.3*   HCT 23.5*   PLT 44*   MCV 79*   MCH 24.4*   MCHC 31.1*     CMP:   Recent Labs   Lab 12/06/24  0345 12/06/24  1359   *  128* 103   CALCIUM 7.8*  7.8* 8.1*   ALBUMIN 2.3*  2.3* 2.3*   PROT 5.4*  --    *  133* 134*   K 3.4*  3.3* 3.3*   CO2 16*  19* 20*     107 106   BUN 7*  7* 8   CREATININE 1.7*  1.8* 2.1*   ALKPHOS 53  --    ALT 11  --    AST 19  --    BILITOT 3.2*  --      LFTs:   Recent Labs   Lab 12/06/24  0345 12/06/24  1359   ALT 11  --    AST 19  --    ALKPHOS 53  --    BILITOT 3.2*  --    PROT 5.4*  --    ALBUMIN 2.3*  2.3* 2.3*         Assessment/Plan:     Renal/  JAE (acute kidney injury)  JAE is likely due to pre-renal azotemia due to intravascular volume depletion secondary to decompensated hepatic cirrhosis with volume overload and acute tubular necrosis caused by hemodynamic instability, renal hypoperfusion, severe sepsis with GNR bacteremia. Initial presentation concerning for hepatorenal syndrome, transferred to MICU for vasopressors without success in reaching MAP goal 85-90 for treatment of HRS. Currently, patient with oligouric JAE more contributed by ATN as above.     Recommendations:    -Plan for 8 hrs of SLED tonight for removal of uremic toxins and volume control -400cc/hr goal is to remove 2L  -Still of pressors.  Map ranges from (55-65) while on pressors  -  Recommend goals of care  discussion regarding current prognosis and liver transplant candidacy   -Avoid nephrotoxins and renally dose meds for GFR listed above  -Monitor urine output, serial BMP, and adjust therapy as needed  -Hemodialysis consent obtained & placed in patient chart        Thank you for your consult. I will follow-up with patient. Please contact us if you have any additional questions.    Irma Hernandez MD  Nephrology  [facility]    ATTENDING PHYSICIAN ATTESTATION  I have personally verified the history and examined the patient. I thoroughly reviewed the demographic, clinical, laboratorial and imaging information available in medical records. I agree with the assessment and recommendations provided by the subspecialty resident who was under my supervision.

## 2024-12-06 NOTE — PT/OT/SLP PROGRESS
Occupational Therapy   CO-Treatment    Name: Juan Carlos Yoo Sr.  MRN: [id number]  Admitting Diagnosis:  Decompensated cirrhosis       Recommendations:     Discharge Recommendations: Moderate Intensity Therapy  Discharge Equipment Recommendations:  wheelchair, walker, rolling, bedside commode, bath bench, hospital bed, grab bar  Barriers to discharge:  Decreased caregiver support    Assessment:     Juan Carlos Yoo Sr. is a 69 y.o. male with a medical diagnosis of Decompensated cirrhosis.  He presents with progress towards goals as evidenced by decreased assistance needed with functional transfers, and ability perform toileting from bedside commode. Pt with asymptomatic hTN throughout session despite RN titrating pressors. RN in room during session and ok'd continued tx. Performance deficits affecting function are weakness, gait instability, decreased upper extremity function, impaired cardiopulmonary response to activity, impaired endurance, impaired balance, decreased lower extremity function, impaired coordination, decreased safety awareness, impaired self care skills, impaired functional mobility, pain, edema. Pt benefits from co-treatment with PT to accommodate pt's activity tolerance and progression with therapy.      Rehab Prognosis:  Good; patient would benefit from acute skilled OT services to address these deficits and reach maximum level of function.       Plan:     Patient to be seen 4 x/week to address the above listed problems via self-care/home management, therapeutic activities, therapeutic exercises  Plan of Care Expires: 12/27/24  Plan of Care Reviewed with: patient    Subjective     Chief Complaint: bloating and stomach discomfort  Patient/Family Comments/goals: to go home  Pain/Comfort:  Pain Rating 1: 0/10  Pain Rating Post-Intervention 1: 0/10    Objective:     Communicated with: RN prior to session.  Patient found supine with blood pressure cuff, pulse ox (continuous), telemetry, arterial line,  Trialysis, peripheral IV, peacock catheter upon OT entry to room.    General Precautions: Standard, fall    Orthopedic Precautions:N/A  Braces: N/A  Respiratory Status: Room air     Occupational Performance:     Bed Mobility:    Patient completed Scooting/Bridging with minimum assistance  Patient completed Supine to Sit with moderate assistance and 2 persons  Patient completed Sit to Supine with moderate assistance and 2 persons     Functional Mobility/Transfers:  Patient completed Sit <> Stand Transfer with minimum assistance  with  no assistive device from EOB and bedside commode  Functional Mobility: Min A x 2 for SPT to bedside commode; pt then mobilized x ~4 steps forward to HOB with Min A    Activities of Daily Living:  Grooming: stand by assistance seated EOB  Lower Body Dressing: maximal assistance    Toileting: maximal assistance for pericare from bedside commode      AMPA 6 Click ADL: 13    Treatment & Education:  Pt ed on OT POC  Pt on 0.03 levo with MAP goal 55-60  Upon sitting EOB, MAP dropped to low 50s and RN titrated levo to 0.04  Pt asymptomatic throughout with MAP just at or just below goal - RN aware and ok'd continued toileting  Pt ed on ROM ex's daily for increased overall strength and endurance to reduce risk of hospital acquired weakness  Pt ed on safety with ADL and fall risk  Reinforced use of call button to contact nursing staff for assistance with mobility    Patient left with bed in chair position with all lines intact, call button in reach, and RN notified    GOALS:   Multidisciplinary Problems       Occupational Therapy Goals          Problem: Occupational Therapy    Goal Priority Disciplines Outcome Interventions   Occupational Therapy Goal     OT, PT/OT Progressing    Description: Goals to be met by: 12/25/24     Patient will increase functional independence with ADLs by performing:    UE Dressing with Set-up Assistance.  LE Dressing with Stand-by Assistance.  Grooming while standing  at sink with Stand-by Assistance.  Toileting from toilet with Stand-by Assistance for hygiene and clothing management.   Step transfer: with SBA and use of LRAD  Supine to sit with SBA  Toilet transfer to toilet with SBA and use of LRAD.  Independent with HEP to BUE to improve ROM                         Time Tracking:     OT Date of Treatment: 12/06/24  OT Start Time: 0837  OT Stop Time: 0904  OT Total Time (min): 27 min    Billable Minutes:Self Care/Home Management 15  Therapeutic Activity 10               12/6/2024

## 2024-12-06 NOTE — SUBJECTIVE & OBJECTIVE
Interval History:     Status post CRT/sled for 8 hours, 2 L removed overnight, still on pressors, map ranges from 55 to 60 urine output 170 mL, plan for another session of sled tonight    Review of patient's allergies indicates:  No Known Allergies  Current Facility-Administered Medications   Medication Frequency    0.9%  NaCl infusion (CRRT USE ONLY) Continuous    albuterol-ipratropium 2.5 mg-0.5 mg/3 mL nebulizer solution 3 mL Q6H PRN    aluminum-magnesium hydroxide-simethicone 200-200-20 mg/5 mL suspension 30 mL QID PRN    dextrose 10% bolus 125 mL 125 mL PRN    dextrose 10% bolus 250 mL 250 mL PRN    glucagon (human recombinant) injection 1 mg PRN    glucose chewable tablet 16 g PRN    glucose chewable tablet 24 g PRN    hydrocortisone tablet 15 mg Before breakfast    And    hydrocortisone tablet 10 mg Daily    lactulose 20 gram/30 mL solution Soln 30 g TID    magnesium sulfate 2g in water 50mL IVPB (premix) PRN    melatonin tablet 6 mg Nightly PRN    meropenem injection 1 g Q12H    metoprolol tartrate (LOPRESSOR) split tablet 12.5 mg BID    midodrine tablet 20 mg Q8H    naloxone 0.4 mg/mL injection 0.02 mg PRN    NORepinephrine 4 mg in sodium chloride 0.9% 250 mL infusion (premix) Continuous    ondansetron injection 4 mg Q8H PRN    pantoprazole EC tablet 40 mg Daily    simethicone chewable tablet 80 mg QID PRN    sodium chloride 0.9% flush 10 mL Q12H PRN    sodium chloride 0.9% flush 10 mL PRN    sodium phosphate 20.01 mmol in D5W 250 mL IVPB PRN    sodium phosphate 30 mmol in D5W 250 mL IVPB PRN    sodium phosphate 39.99 mmol in D5W 250 mL IVPB PRN    tamsulosin 24 hr capsule 0.4 mg QHS       Objective:     Vital Signs (Most Recent):  Temp: 97.8 °F (36.6 °C) (12/06/24 1105)  Pulse: 81 (12/06/24 1200)  Resp: 14 (12/06/24 1200)  BP: (!) 99/46 (12/06/24 0808)  SpO2: 97 % (12/06/24 1200) Vital Signs (24h Range):  Temp:  [97.7 °F (36.5 °C)-98.7 °F (37.1 °C)] 97.8 °F (36.6 °C)  Pulse:  [74-93] 81  Resp:  [12-30]  14  SpO2:  [86 %-100 %] 97 %  BP: ()/(36-46) 99/46  Arterial Line BP: ()/(37-49) 103/38     Weight: (!) 148.7 kg (327 lb 13.2 oz) (12/06/24 0301)  Body mass index is 43.25 kg/m².  Body surface area is 2.77 meters squared.    I/O last 3 completed shifts:  In: 5121.9 [I.V.:4872.4; IV Piggyback:249.5]  Out: 7474 [Urine:255; Other:7219]     Physical Exam  Constitutional:       General: He is not in acute distress.     Appearance: He is ill-appearing. He is not toxic-appearing.   HENT:      Head: Normocephalic and atraumatic.   Eyes:      Extraocular Movements: Extraocular movements intact.      Pupils: Pupils are equal, round, and reactive to light.   Cardiovascular:      Rate and Rhythm: Normal rate.   Pulmonary:      Effort: No respiratory distress.      Breath sounds: No wheezing or rales.   Abdominal:      General: There is distension.      Tenderness: There is no abdominal tenderness. There is no guarding.   Musculoskeletal:         General: Swelling present.      Right lower leg: Edema present.      Left lower leg: Edema present.   Skin:     Coloration: Skin is pale. Skin is not jaundiced.   Neurological:      Mental Status: He is oriented to person, place, and time.   Psychiatric:         Behavior: Behavior normal.          Significant Labs:  CBC:   Recent Labs   Lab 12/06/24  0345   WBC 10.04   RBC 2.99*   HGB 7.3*   HCT 23.5*   PLT 44*   MCV 79*   MCH 24.4*   MCHC 31.1*     CMP:   Recent Labs   Lab 12/06/24  0345 12/06/24  1359   *  128* 103   CALCIUM 7.8*  7.8* 8.1*   ALBUMIN 2.3*  2.3* 2.3*   PROT 5.4*  --    *  133* 134*   K 3.4*  3.3* 3.3*   CO2 16*  19* 20*     107 106   BUN 7*  7* 8   CREATININE 1.7*  1.8* 2.1*   ALKPHOS 53  --    ALT 11  --    AST 19  --    BILITOT 3.2*  --      LFTs:   Recent Labs   Lab 12/06/24  0345 12/06/24  1359   ALT 11  --    AST 19  --    ALKPHOS 53  --    BILITOT 3.2*  --    PROT 5.4*  --    ALBUMIN 2.3*  2.3* 2.3*

## 2024-12-06 NOTE — ASSESSMENT & PLAN NOTE
JAE is likely due to pre-renal azotemia due to intravascular volume depletion secondary to decompensated hepatic cirrhosis with volume overload and acute tubular necrosis caused by hemodynamic instability, renal hypoperfusion, severe sepsis with GNR bacteremia. Initial presentation concerning for hepatorenal syndrome, transferred to MICU for vasopressors without success in reaching MAP goal 85-90 for treatment of HRS. Currently, patient with oligouric JAE more contributed by ATN as above.     Recommendations:    -Plan for 8 hrs of SLED tonight for removal of uremic toxins and volume control -400cc/hr goal is to remove 2L  -Still of pressors.  Map ranges from (55-65) while on pressors  -  Recommend goals of care discussion regarding current prognosis and liver transplant candidacy   -Avoid nephrotoxins and renally dose meds for GFR listed above  -Monitor urine output, serial BMP, and adjust therapy as needed  -Hemodialysis consent obtained & placed in patient chart

## 2024-12-06 NOTE — PLAN OF CARE
MICU DAILY GOALS     Family/Goals of care/Code Status   Code Status: Full Code    24H Vital Sign Range  Temp:  [97.7 °F (36.5 °C)-98.7 °F (37.1 °C)]   Pulse:  [74-91]   Resp:  [12-30]   BP: (103-107)/(35-36)   SpO2:  [86 %-100 %]   Arterial Line BP: ()/(35-49)      Shift Events (include procedures and significant events)   No acute events throughout shift. 6 hours of SLED completed and successfully rinsed back. Levo currently at 0.04. 2 big bowel movements. Replaced phos, mag, and potassium.     AWAKE RASS: Goal - RASS Goal: 0-->alert and calm  Actual - RASS (Pedraza Agitation-Sedation Scale): alert and calm    Restraint necessity: Not necessary   BREATHE SBT: Not intubated    Coordinate A & B, analgesics/sedatives Pain: managed   SAT: Not intubated   Delirium CAM-ICU: Overall CAM-ICU: Negative   Early(intubated/ Progressive (non-intubated) Mobility MOVE Screen (INTUBATED ONLY): Not intubated    Activity: Activity Management: Arm raise - L1, Partial stand - L2   Feeding/Nutrition Diet order: Diet/Nutrition Received: low saturated fat/low cholesterol, Specialty Diet/Nutrition Received: renal diet   Thrombus DVT prophylaxis: VTE Core Measure: (SCDs) Sequential compression device initiated/maintained   HOB Elevation Head of Bed (HOB) Positioning: HOB at 30-45 degrees   Ulcer Prophylaxis GI: yes   Glucose control managed Glycemic Management: blood glucose monitored   Skin Skin assessment:     Sacrum intact/not altered? Yes  Heels intact/not altered? Yes  Surgical wound? No    CHECK ONE!   (no altered skin or altered skin) and sub boxes:  [] No Altered Skin Integrity Present    []Prevention Measures Documented    [x] Altered Skin Integrity Present or Discovered   [x] LDA present in EPIC, daily doc completed              [x] LDA added if not in EPIC (describe wound).                    When describing wound, do not stage, use descriptive words only.    [] Wound Image Taken (required on admit,                    transfer/discharge and every Tuesday)    Wound Care Consulted? No   Bowel Function constipation    Indwelling Catheter Necessity [REMOVED]      Urethral Catheter 11/26/24 1738-Reason for Continuing Urinary Catheterization: Urinary retention       Urethral Catheter 12/03/24 1607 Silicone 16 Fr.-Reason for Continuing Urinary Catheterization: Critically ill in ICU and requiring hourly monitoring of intake/output  [REMOVED]      Urethral Catheter 11/20/24 1802 Double-lumen-Reason for Continuing Urinary Catheterization: Urinary retention    Trialysis (Dialysis) Catheter 11/24/24 0441 right internal jugular-Line Necessity Review: CRRT/HD  yes   De-escalation Antibiotics No        VS and assessment per flow sheet, patient progressing towards goals as tolerated, plan of care reviewed with Juan Carlos Yoo Sr., all concerns addressed, will continue to monitor.   OBSERVATION

## 2024-12-07 LAB
ALBUMIN SERPL BCP-MCNC: 2.2 G/DL (ref 3.5–5.2)
ALP SERPL-CCNC: 57 U/L (ref 40–150)
ALT SERPL W/O P-5'-P-CCNC: 12 U/L (ref 10–44)
ANION GAP SERPL CALC-SCNC: 7 MMOL/L (ref 8–16)
ANION GAP SERPL CALC-SCNC: 9 MMOL/L (ref 8–16)
ANION GAP SERPL CALC-SCNC: 9 MMOL/L (ref 8–16)
ANISOCYTOSIS BLD QL SMEAR: ABNORMAL
AST SERPL-CCNC: 32 U/L (ref 10–40)
BASOPHILS # BLD AUTO: 0.04 K/UL (ref 0–0.2)
BASOPHILS NFR BLD: 0.5 % (ref 0–1.9)
BILIRUB SERPL-MCNC: 2.9 MG/DL (ref 0.1–1)
BUN SERPL-MCNC: 4 MG/DL (ref 8–23)
BUN SERPL-MCNC: 4 MG/DL (ref 8–23)
BUN SERPL-MCNC: 5 MG/DL (ref 8–23)
BURR CELLS BLD QL SMEAR: ABNORMAL
CALCIUM SERPL-MCNC: 7.4 MG/DL (ref 8.7–10.5)
CALCIUM SERPL-MCNC: 7.5 MG/DL (ref 8.7–10.5)
CALCIUM SERPL-MCNC: 7.8 MG/DL (ref 8.7–10.5)
CALCIUM SERPL-MCNC: 8 MG/DL (ref 8.7–10.5)
CALCIUM SERPL-MCNC: 8 MG/DL (ref 8.7–10.5)
CHLORIDE SERPL-SCNC: 106 MMOL/L (ref 95–110)
CHLORIDE SERPL-SCNC: 107 MMOL/L (ref 95–110)
CHLORIDE SERPL-SCNC: 107 MMOL/L (ref 95–110)
CHLORIDE SERPL-SCNC: 109 MMOL/L (ref 95–110)
CHLORIDE SERPL-SCNC: 109 MMOL/L (ref 95–110)
CO2 SERPL-SCNC: 18 MMOL/L (ref 23–29)
CO2 SERPL-SCNC: 20 MMOL/L (ref 23–29)
CO2 SERPL-SCNC: 20 MMOL/L (ref 23–29)
CREAT SERPL-MCNC: 1.1 MG/DL (ref 0.5–1.4)
CREAT SERPL-MCNC: 1.2 MG/DL (ref 0.5–1.4)
CREAT SERPL-MCNC: 1.8 MG/DL (ref 0.5–1.4)
CREAT SERPL-MCNC: 1.9 MG/DL (ref 0.5–1.4)
CREAT SERPL-MCNC: 1.9 MG/DL (ref 0.5–1.4)
DACRYOCYTES BLD QL SMEAR: ABNORMAL
DIFFERENTIAL METHOD BLD: ABNORMAL
EOSINOPHIL # BLD AUTO: 0.1 K/UL (ref 0–0.5)
EOSINOPHIL NFR BLD: 0.9 % (ref 0–8)
ERYTHROCYTE [DISTWIDTH] IN BLOOD BY AUTOMATED COUNT: 24.8 % (ref 11.5–14.5)
EST. GFR  (NO RACE VARIABLE): 37.2 ML/MIN/1.73 M^2
EST. GFR  (NO RACE VARIABLE): 37.2 ML/MIN/1.73 M^2
EST. GFR  (NO RACE VARIABLE): 39.7 ML/MIN/1.73 M^2
EST. GFR  (NO RACE VARIABLE): >60 ML/MIN/1.73 M^2
EST. GFR  (NO RACE VARIABLE): >60 ML/MIN/1.73 M^2
GLUCOSE SERPL-MCNC: 119 MG/DL (ref 70–110)
GLUCOSE SERPL-MCNC: 121 MG/DL (ref 70–110)
GLUCOSE SERPL-MCNC: 127 MG/DL (ref 70–110)
GLUCOSE SERPL-MCNC: 127 MG/DL (ref 70–110)
GLUCOSE SERPL-MCNC: 150 MG/DL (ref 70–110)
HCT VFR BLD AUTO: 22.5 % (ref 40–54)
HGB BLD-MCNC: 7.2 G/DL (ref 14–18)
HYPOCHROMIA BLD QL SMEAR: ABNORMAL
IMM GRANULOCYTES # BLD AUTO: 0.03 K/UL (ref 0–0.04)
IMM GRANULOCYTES NFR BLD AUTO: 0.3 % (ref 0–0.5)
LYMPHOCYTES # BLD AUTO: 0.9 K/UL (ref 1–4.8)
LYMPHOCYTES NFR BLD: 10.4 % (ref 18–48)
MAGNESIUM SERPL-MCNC: 2 MG/DL (ref 1.6–2.6)
MAGNESIUM SERPL-MCNC: 2.3 MG/DL (ref 1.6–2.6)
MCH RBC QN AUTO: 24.8 PG (ref 27–31)
MCHC RBC AUTO-ENTMCNC: 32 G/DL (ref 32–36)
MCV RBC AUTO: 78 FL (ref 82–98)
MONOCYTES # BLD AUTO: 1.4 K/UL (ref 0.3–1)
MONOCYTES NFR BLD: 15.7 % (ref 4–15)
NEUTROPHILS # BLD AUTO: 6.2 K/UL (ref 1.8–7.7)
NEUTROPHILS NFR BLD: 72.2 % (ref 38–73)
NRBC BLD-RTO: 0 /100 WBC
OVALOCYTES BLD QL SMEAR: ABNORMAL
PHOSPHATE SERPL-MCNC: 3 MG/DL (ref 2.7–4.5)
PHOSPHATE SERPL-MCNC: 3.3 MG/DL (ref 2.7–4.5)
PHOSPHATE SERPL-MCNC: 3.3 MG/DL (ref 2.7–4.5)
PHOSPHATE SERPL-MCNC: 3.6 MG/DL (ref 2.7–4.5)
PHOSPHATE SERPL-MCNC: 3.7 MG/DL (ref 2.7–4.5)
PLATELET # BLD AUTO: 39 K/UL (ref 150–450)
PLATELET BLD QL SMEAR: ABNORMAL
PMV BLD AUTO: ABNORMAL FL (ref 9.2–12.9)
POIKILOCYTOSIS BLD QL SMEAR: SLIGHT
POLYCHROMASIA BLD QL SMEAR: ABNORMAL
POTASSIUM SERPL-SCNC: 3.3 MMOL/L (ref 3.5–5.1)
POTASSIUM SERPL-SCNC: 3.3 MMOL/L (ref 3.5–5.1)
POTASSIUM SERPL-SCNC: 3.4 MMOL/L (ref 3.5–5.1)
POTASSIUM SERPL-SCNC: 4 MMOL/L (ref 3.5–5.1)
POTASSIUM SERPL-SCNC: 4 MMOL/L (ref 3.5–5.1)
PROT SERPL-MCNC: 5.3 G/DL (ref 6–8.4)
RBC # BLD AUTO: 2.9 M/UL (ref 4.6–6.2)
SCHISTOCYTES BLD QL SMEAR: ABNORMAL
SODIUM SERPL-SCNC: 132 MMOL/L (ref 136–145)
SODIUM SERPL-SCNC: 133 MMOL/L (ref 136–145)
SODIUM SERPL-SCNC: 134 MMOL/L (ref 136–145)
SODIUM SERPL-SCNC: 136 MMOL/L (ref 136–145)
SODIUM SERPL-SCNC: 136 MMOL/L (ref 136–145)
TARGETS BLD QL SMEAR: ABNORMAL
WBC # BLD AUTO: 8.64 K/UL (ref 3.9–12.7)

## 2024-12-07 PROCEDURE — 90945 DIALYSIS ONE EVALUATION: CPT

## 2024-12-07 PROCEDURE — 99291 CRITICAL CARE FIRST HOUR: CPT | Mod: ,,, | Performed by: INTERNAL MEDICINE

## 2024-12-07 PROCEDURE — 80069 RENAL FUNCTION PANEL: CPT | Performed by: STUDENT IN AN ORGANIZED HEALTH CARE EDUCATION/TRAINING PROGRAM

## 2024-12-07 PROCEDURE — 85025 COMPLETE CBC W/AUTO DIFF WBC: CPT

## 2024-12-07 PROCEDURE — 25000003 PHARM REV CODE 250

## 2024-12-07 PROCEDURE — 99233 SBSQ HOSP IP/OBS HIGH 50: CPT | Mod: ,,, | Performed by: HOSPITALIST

## 2024-12-07 PROCEDURE — 20000000 HC ICU ROOM

## 2024-12-07 PROCEDURE — 80100008 HC CRRT DAILY MAINTENANCE

## 2024-12-07 PROCEDURE — 25000003 PHARM REV CODE 250: Performed by: STUDENT IN AN ORGANIZED HEALTH CARE EDUCATION/TRAINING PROGRAM

## 2024-12-07 PROCEDURE — 25000003 PHARM REV CODE 250: Performed by: INTERNAL MEDICINE

## 2024-12-07 PROCEDURE — 63600175 PHARM REV CODE 636 W HCPCS: Performed by: INTERNAL MEDICINE

## 2024-12-07 PROCEDURE — 94761 N-INVAS EAR/PLS OXIMETRY MLT: CPT

## 2024-12-07 PROCEDURE — 83735 ASSAY OF MAGNESIUM: CPT | Performed by: STUDENT IN AN ORGANIZED HEALTH CARE EDUCATION/TRAINING PROGRAM

## 2024-12-07 PROCEDURE — 80053 COMPREHEN METABOLIC PANEL: CPT

## 2024-12-07 PROCEDURE — 84100 ASSAY OF PHOSPHORUS: CPT

## 2024-12-07 PROCEDURE — 63600175 PHARM REV CODE 636 W HCPCS: Performed by: STUDENT IN AN ORGANIZED HEALTH CARE EDUCATION/TRAINING PROGRAM

## 2024-12-07 RX ORDER — POTASSIUM CHLORIDE 20 MEQ/1
40 TABLET, EXTENDED RELEASE ORAL
Status: COMPLETED | OUTPATIENT
Start: 2024-12-07 | End: 2024-12-07

## 2024-12-07 RX ORDER — MAGNESIUM SULFATE HEPTAHYDRATE 40 MG/ML
2 INJECTION, SOLUTION INTRAVENOUS
Status: DISCONTINUED | OUTPATIENT
Start: 2024-12-07 | End: 2024-12-08

## 2024-12-07 RX ORDER — HYDROCODONE BITARTRATE AND ACETAMINOPHEN 500; 5 MG/1; MG/1
TABLET ORAL CONTINUOUS
Status: ACTIVE | OUTPATIENT
Start: 2024-12-07 | End: 2024-12-08

## 2024-12-07 RX ADMIN — MIDODRINE HYDROCHLORIDE 20 MG: 5 TABLET ORAL at 02:12

## 2024-12-07 RX ADMIN — MIDODRINE HYDROCHLORIDE 20 MG: 5 TABLET ORAL at 08:12

## 2024-12-07 RX ADMIN — SODIUM CHLORIDE: 9 INJECTION, SOLUTION INTRAVENOUS at 05:12

## 2024-12-07 RX ADMIN — METOPROLOL TARTRATE 12.5 MG: 25 TABLET, FILM COATED ORAL at 08:12

## 2024-12-07 RX ADMIN — MAGNESIUM SULFATE HEPTAHYDRATE 2 G: 40 INJECTION, SOLUTION INTRAVENOUS at 01:12

## 2024-12-07 RX ADMIN — LACTULOSE 30 G: 20 SOLUTION ORAL at 02:12

## 2024-12-07 RX ADMIN — DEXTROSE MONOHYDRATE 30 MMOL: 12500 INJECTION, SOLUTION INTRAVENOUS at 01:12

## 2024-12-07 RX ADMIN — POTASSIUM BICARBONATE 40 MEQ: 391 TABLET, EFFERVESCENT ORAL at 08:12

## 2024-12-07 RX ADMIN — NOREPINEPHRINE BITARTRATE 0.06 MCG/KG/MIN: 16 SOLUTION INTRAVENOUS at 09:12

## 2024-12-07 RX ADMIN — MEROPENEM 1 G: 1 INJECTION, POWDER, FOR SOLUTION INTRAVENOUS at 05:12

## 2024-12-07 RX ADMIN — LACTULOSE 30 G: 20 SOLUTION ORAL at 08:12

## 2024-12-07 RX ADMIN — TAMSULOSIN HYDROCHLORIDE 0.4 MG: 0.4 CAPSULE ORAL at 08:12

## 2024-12-07 RX ADMIN — NOREPINEPHRINE BITARTRATE 0.02 MCG/KG/MIN: 16 SOLUTION INTRAVENOUS at 05:12

## 2024-12-07 RX ADMIN — MEROPENEM 1 G: 1 INJECTION, POWDER, FOR SOLUTION INTRAVENOUS at 06:12

## 2024-12-07 RX ADMIN — POTASSIUM BICARBONATE 40 MEQ: 391 TABLET, EFFERVESCENT ORAL at 05:12

## 2024-12-07 RX ADMIN — HYDROCORTISONE 10 MG: 5 TABLET ORAL at 02:12

## 2024-12-07 RX ADMIN — POTASSIUM CHLORIDE 40 MEQ: 1500 TABLET, EXTENDED RELEASE ORAL at 08:12

## 2024-12-07 RX ADMIN — NOREPINEPHRINE BITARTRATE 0.04 MCG/KG/MIN: 16 SOLUTION INTRAVENOUS at 01:12

## 2024-12-07 RX ADMIN — PANTOPRAZOLE SODIUM 40 MG: 40 TABLET, DELAYED RELEASE ORAL at 08:12

## 2024-12-07 RX ADMIN — POTASSIUM CHLORIDE 40 MEQ: 1500 TABLET, EXTENDED RELEASE ORAL at 09:12

## 2024-12-07 RX ADMIN — HYDROCORTISONE 15 MG: 5 TABLET ORAL at 08:12

## 2024-12-07 RX ADMIN — MIDODRINE HYDROCHLORIDE 20 MG: 5 TABLET ORAL at 09:12

## 2024-12-07 NOTE — PROGRESS NOTES
12/07/24 1744   Treatment   Treatment Type SCUF   Treatment Status Restart;Order change   Dialysis Machine Number [id number]   Solutions Labeled and Current  Yes   Access Temporary Cath;Right;IJ   Catheter Dressing Intact  Yes   Alarms Engaged Yes   CRRT Comments CRRT restarted without issue

## 2024-12-07 NOTE — SUBJECTIVE & OBJECTIVE
Interval History: S/p SLED 8 hrs last night, tolerated well. 3.4 L removed. On Levophed this morning.     Review of patient's allergies indicates:  No Known Allergies  Current Facility-Administered Medications   Medication Frequency    albuterol-ipratropium 2.5 mg-0.5 mg/3 mL nebulizer solution 3 mL Q6H PRN    aluminum-magnesium hydroxide-simethicone 200-200-20 mg/5 mL suspension 30 mL QID PRN    dextrose 10% bolus 125 mL 125 mL PRN    dextrose 10% bolus 250 mL 250 mL PRN    glucagon (human recombinant) injection 1 mg PRN    glucose chewable tablet 16 g PRN    glucose chewable tablet 24 g PRN    hydrocortisone tablet 15 mg Before breakfast    And    hydrocortisone tablet 10 mg Daily    lactulose 20 gram/30 mL solution Soln 30 g TID    melatonin tablet 6 mg Nightly PRN    meropenem injection 1 g Q12H    metoprolol tartrate (LOPRESSOR) split tablet 12.5 mg BID    midodrine tablet 20 mg Q8H    naloxone 0.4 mg/mL injection 0.02 mg PRN    NORepinephrine 4 mg in sodium chloride 0.9% 250 mL infusion (premix) Continuous    ondansetron injection 4 mg Q8H PRN    pantoprazole EC tablet 40 mg Daily    simethicone chewable tablet 80 mg QID PRN    sodium chloride 0.9% flush 10 mL Q12H PRN    sodium chloride 0.9% flush 10 mL PRN    tamsulosin 24 hr capsule 0.4 mg QHS       Objective:     Vital Signs (Most Recent):  Temp: 97.4 °F (36.3 °C) (12/07/24 1100)  Pulse: 78 (12/07/24 1200)  Resp: 12 (12/07/24 1200)  BP: (!) 103/36 (12/07/24 0857)  SpO2: 98 % (12/07/24 1200) Vital Signs (24h Range):  Temp:  [97.4 °F (36.3 °C)-99.1 °F (37.3 °C)] 97.4 °F (36.3 °C)  Pulse:  [] 78  Resp:  [11-36] 12  SpO2:  [96 %-100 %] 98 %  BP: (103-122)/(36-44) 103/36  Arterial Line BP: ()/(22-49) 108/36     Weight: (!) 141 kg (310 lb 13.6 oz) (12/07/24 0501)  Body mass index is 41.01 kg/m².  Body surface area is 2.69 meters squared.    I/O last 3 completed shifts:  In: 4624.1 [I.V.:4126.8; IV Piggyback:497.3]  Out: 4697 [Urine:90; Other:4607]      Physical Exam  Constitutional:       General: He is not in acute distress.     Appearance: He is ill-appearing. He is not toxic-appearing.   HENT:      Head: Normocephalic and atraumatic.   Eyes:      Extraocular Movements: Extraocular movements intact.      Pupils: Pupils are equal, round, and reactive to light.   Cardiovascular:      Rate and Rhythm: Normal rate.   Pulmonary:      Effort: No respiratory distress.      Breath sounds: No wheezing or rales.   Abdominal:      General: There is distension.      Tenderness: There is no abdominal tenderness. There is no guarding.   Musculoskeletal:         General: Swelling present.      Right lower leg: Edema present.      Left lower leg: Edema present.   Skin:     Coloration: Skin is pale. Skin is not jaundiced.   Neurological:      Mental Status: He is oriented to person, place, and time.   Psychiatric:         Behavior: Behavior normal.          Significant Labs:  CBC:   Recent Labs   Lab 12/07/24 0319   WBC 8.64   RBC 2.90*   HGB 7.2*   HCT 22.5*   PLT 39*   MCV 78*   MCH 24.8*   MCHC 32.0     CMP:   Recent Labs   Lab 12/07/24 0319 12/07/24  1421   *  121* 150*   CALCIUM 7.4*  7.5* 7.8*   ALBUMIN 2.2*  2.2* 2.2*   PROT 5.3*  --    *  133* 132*   K 3.3*  3.3* 3.4*   CO2 20*  20* 18*     106 107   BUN 4*  4* 5*   CREATININE 1.2  1.1 1.8*   ALKPHOS 57  --    ALT 12  --    AST 32  --    BILITOT 2.9*  --      LFTs:   Recent Labs   Lab 12/07/24 0319 12/07/24  1421   ALT 12  --    AST 32  --    ALKPHOS 57  --    BILITOT 2.9*  --    PROT 5.3*  --    ALBUMIN 2.2*  2.2* 2.2*

## 2024-12-07 NOTE — TREATMENT PLAN
Hepatology Treatment Plan    Juan Carlos Yoo Sr. is a 69 y.o. male admitted to hospital 11/20/2024 (Hospital Day: 18) due to Decompensated cirrhosis.     Interval History    Remains on levo 0.06. Started on steroids for AI. Remains on kaylah through 12/8. Ongoing SLED/SCUF with nephrology.     Objective  Temp:  [97.7 °F (36.5 °C)-99.1 °F (37.3 °C)] 99.1 °F (37.3 °C) (12/07 0700)  Pulse:  [] 92 (12/07 1000)  BP: (103-122)/(36-44) 103/36 (12/07 0857)  Resp:  [11-36] 14 (12/07 1000)  SpO2:  [96 %-100 %] 98 % (12/07 1000)  Arterial Line BP: ()/(22-41) 107/36 (12/07 1000)    Laboratory    Lab Results   Component Value Date    WBC 8.64 12/07/2024    HGB 7.2 (L) 12/07/2024    HCT 22.5 (L) 12/07/2024    MCV 78 (L) 12/07/2024    PLT 39 (LL) 12/07/2024       Lab Results   Component Value Date     (L) 12/07/2024     (L) 12/07/2024    K 3.3 (L) 12/07/2024    K 3.3 (L) 12/07/2024     12/07/2024     12/07/2024    CO2 20 (L) 12/07/2024    CO2 20 (L) 12/07/2024    BUN 4 (L) 12/07/2024    BUN 4 (L) 12/07/2024    CREATININE 1.2 12/07/2024    CREATININE 1.1 12/07/2024    CALCIUM 7.4 (L) 12/07/2024    CALCIUM 7.5 (L) 12/07/2024       Lab Results   Component Value Date    ALBUMIN 2.2 (L) 12/07/2024    ALBUMIN 2.2 (L) 12/07/2024    ALT 12 12/07/2024    AST 32 12/07/2024    GGT 30 12/13/2022    ALKPHOS 57 12/07/2024    BILITOT 2.9 (H) 12/07/2024       Lab Results   Component Value Date    INR 2.0 (H) 11/27/2024    INR 2.0 (H) 11/26/2024    INR 1.7 (H) 11/23/2024       MELD 3.0: 32 at 11/29/2024  9:57 PM  MELD-Na: 31 at 11/29/2024  9:57 PM  Calculated from:  Serum Creatinine: 3 mg/dL at 11/29/2024  9:57 PM  Serum Sodium: 132 mmol/L at 11/29/2024  9:57 PM  Total Bilirubin: 2.9 mg/dL at 11/29/2024 10:34 AM  Serum Albumin: 2.8 g/dL at 11/29/2024  9:57 PM  INR(ratio): 2 at 11/27/2024  2:09 AM  Age at listing (hypothetical): 69 years  Sex: Male at 11/29/2024  9:57 PM      Assessment    Juan Carlos Yoo Sr. is a 69  y.o. male with history of EtOH use disorder, EtOH cirrhosis, HCC s/p y90, morbid obesity BMI 44, HTN, and Afib who presents with severe anasarca and JAE. Known history of cirrhosis and is established patient of Dr. Daniels. Was previously evaluated for transplant and was declined due to persistently positive PETHs in the past and also due to concern over BMI. Since May 2023 PETH has been negative, though patient reports he has not drank alcohol for the last 6 months at least. Presents now with decompensated cirrhosis and with significant renal injury with Cr 5.7 up from 1.5 a month prior. Unclear etiology of cirrhosis decompensation. He does report non-adherence with fluid restriction and volume overload could be contributing; unclear if patient has been taking medications appropriately given his poor insight. Also presents with marked renal dysfunction which is new from 1 month prior, and worsening renal function which is concerning for HRS. No prior known history of CHF and last TTE in 2022 with normal systolic function. Patient does report changes to his diuretic regimen at home recently that were made while he was at [facility] and is not sure if these changes were helping him keep fluid off. No [facility] records available, but it seems that patient had home lasix increased to 60mg BID and had metolazone 2.5 every other week added. Patient with fairly limited insight into his medical conditions and his medications, and has poor social support at home with only his son nearby who is a single father. PETH from 11/20 negative (last positive 2/2023).  Blood cultures from admission with B. Diminuta, micrococcus luteus, lysinobacillus species. ID consulted, on vancomycin and zosyn. Nephrology following for JAE; has been on SLED. Etiology of JAE likely HRS, has been in the MICU on pressor support. Pressors have weaned down but still requires them for SLED. PT/OT recommending moderate intensity therapy. On 11/28, abdomen was  diffusely distended and he had bilious emesis. XRAY abdomen concerning for ileus vs obstruction. NGT placed with improvement in symptoms, removed on 11/30 and diet advanced to clear liquids.      Problem List:  Decompensated cirrhosis with ascites  Acute renal failure, concerning for HRS/ATN, now dialysis dependent   Severe obesity, BMI 43  Hx of EtOH Use disorder  Hx of HCC s/p Y90  Shock/Adrenal Insufficiency    Ileus- resolved      Recommendations:  - He is unfortunately not a transplant candidate at this time as he remains in the ICU critically ill in renal failure requiring pressor support. Additional prohibiting factors to liver transplant are poor functional status/weakness and poor insight into clinical illness. He does appear to have a good relationship with his son who has visited him in the hospital, but it is unclear whether he would be able to be a full time caregiver should he ever improve enough to be considered for transplant. Once off of pressor support and improvement in functional status, may consider/discuss transplant candidacy. It is unlikely that he will recover completely and his clinical trajectory remains poor. Palliative care was consulted and has since signed off as the patient wishes to continue with [facility] medical management. Accordingly, patient has been undergoing SLED/SCUF with nephrology's assistance.   - Nephrology following for JAE.  - Please obtain daily CBC, CMP, PT/INR.    Thank you for involving us in the care of Juan Carlos Yoo Sr.. Please call with any additional concerns or questions.    Shira Hernandez DO   Gastroenterology Fellow PGY- V  [facility]

## 2024-12-07 NOTE — PROGRESS NOTES
[facility] - Medical [facility]  Critical Care Medicine  Progress Note    Patient Name: Juan Carlos Yoo Sr.  MRN: [id number]  Admission Date: 11/20/2024  Hospital Length of Stay: 17 days  Code Status: Full Code  Attending Provider: Evelyn Amos MD  Primary Care Provider: Nyla Rios FNP   Principal Problem: Decompensated cirrhosis    Subjective:     HPI:  Juan Carlos Yoo is a 69 male with PMH of alcoholic cirrhosis, esophageal varices, Afib on eliquis, and HTN who presents for c/o worsening diffuse swelling as well as R arm pain/erythema. He was recently hospitalized 1 month ago at [facility] for volume overload in the setting of decompensated cirrhosis. He was treated with IV diuresis and discharged with adjustment to his diuretics, currently on lasix 60mg BID and metolazone 2.5mg every other week as per family. Patient reports diffuse swelling of his upper and lower extremities in addition to distended abdomen and worsening dyspnea, which he believes is due to recent medication adjustment. Family states he is non-compliant with low sodium diet and fluid restriction. Family states he has been compliant with diuretics, but rom't taken eliquis for 3 days due to issue getting refill. He also reports scratching right arm on door frame 3-4 days ago which has become red and painful after wrapping in in bandage. He claims to be compliant with medications, but is a poor historian. He follows with hepatology, not currently active on transplant list. He states he hasn't consumed alcohol for at least 9 months. Has c/o chills, but denies fever, cough, nausea, vomiting, chest pain, dysuria, hematuria, blood in stool. Workup in ED remarkable for VS: T 97.6 HR 94 RR 22 BP 95/56 O2 sat 97% on RA. Hb 6.7, MCV 75, Plt 81, PT/INR 22.6/2.2, Na 128, K 6.1, BUN/Cr 49/5.7, Alb 2.8, AST/ALT 35/9, Ammonia 104, , Trop neg, LA 2.9, CXR: Cardiac size is enlarged similar to prior.  No large volume of pleural fluid noted although there is mild  blunting of the right costophrenic angle which may relate to a small amount of pleural fluid.  Suspected right basilar opacity, to be correlated clinically for infection. RUE venous doppler: No thrombus in central veins of the right upper extremity. Patient received vanc/zosyn and lasix 80mg IVP in ED and will be admitted to hospital medicine service for further management.     Pt was admitted to hospital medicine. Blood cultures positive for Gram-positive cocci and Gram-negative rods. ID has been consulted. S/p 2 units PRBC for Hemoglobin dropped 6.8 on 11/21. Pt was on Eliquis for atrial fibrillation prior to admission which was held.  Hepatology following patient's clinical course, but are not evaluating him for transplant at this time. Diuretics were held because of concern for HRS.  Patient was started on albumin and midodrine per Nephrology recommendations for HRS.  Renal function continued to worsen and recommendation per Nephrology to transfer to ICU for maintaining goal map over 85 on Levophed.  ICU was notified and patient to be assessed to be transferred to ICU.       Hospital/ICU Course:  69 y.o. male with history of EtOH use disorder, EtOH cirrhosis, HCC s/p y90, morbid obesity BMI 44, HTN, and Afib who presents with severe anasarca and JAE.      Appreciate hepatology and nephrology recommendations.  Diuretics were held because of concern for HRS.  Patient was started on albumin and midodrine per Nephrology recommendations for HRS.  Renal function continued to worsen and recommendation per Nephrology to transfer to ICU for maintaining goal map over 85 on Levophed.  ICU was notified and patient to be assessed to be transferred to ICU.     Patient also has Gram-positive cocci and Gram-negative rods in his blood now.  Possible sources could be got translocation and barrier related infection through skin as he has been significantly edematous and has been oozing.  Has been on vancomycin and Rocephin.  Id  was consulted this morning.  Repeat blood cultures were obtained.     Continues to have anemia.  Hemoglobin dropped on 11/20 and was given 1 unit of packed red blood cells.  Did not respond appropriately.  Hemoglobin dropped again to 6.8 on 11/21 and was given 1 unit of packed red blood cells.  Hemoglobin again on 11/22 is 7.2.  No reported melena or hematochezia.  Patient was on Eliquis for atrial fibrillation prior to admission which was held.  Given history of esophageal varices and portal hypertensive gastropathy whereas also could be component of slow bleeding, under production due to CKD and hemolysis while also with decompensated cirrhosis.  Started on Protonix IV b.i.d. and octreotide.     Also clarified with hepatology.  Given patient's current infection we will await ID recommendations however we will be challenging to consider transplant evaluation at this time.  Trend clinical course     Goal clarification with the patient and family.  Patient at this time wants to be full code and continue with Levophed in ICU.  They would consider discussion with palliative care in a day or so if things do not get better.     In MICU patient started on Levophed, however titration remained difficult given tachycardic response from the patient.     11/24 Trialysis and arterial lines placed overnight and currently on levo and norepi. SLED today.    11/25 Boo dc. Increased midodrine. Continue steroids until d/c pressors. Discussed with Nephrology about our concern for sustained use of pressors to achieve their MAP goals of 85. Will follow up tomorrow.     11/26 Platelets 48. Repeat 38 confirming thrombocytopenia. Concern for HIT. D/c heparin. Increased lactulose dosing. Bladder scan revealed urine in bladder. Boo placed. Off vaso. Continuing levo. Maintain MAP goals 80. PT/OT consulted.     11/27 MAP goal 75-80. Heparin antibody result pending.     11/28 Pt with abdominal pain, decreased bowel movements, emesis. Lactic  acid 2.9 and repeat 2.7. Less concerned for mesenteric ischemia and more of a concern was for SBO. NG tube placed with subsequent suction of 500mL fluid. Abdomen less distended after placement and pt subsequently had bowel movement. MAP Goal of 60 with plan to come off pressors entirely and switch to dialysis after permacath, as he is not  liver transplant eligible at this time.     11/29 Pt with ileus. Clear liquids for now. Platelets 24. HIT versus zosyn induced? Peripheral smear sent. Zosyn changed to kaylah. Consent and transfuse 1U. GOC conversation today. Pt opting for long term dialysis.     11/30 naeon. Started back on heparin. One time dose of 120mg lasix today. Hold off SLED/SCUF today and give IV lasix 120 mg once; plan for SLED/SCUF tomorrow     12/1 Patient is becoming more dependent on dialysis. Not a current liver transplant candidate. Still complaining of abdominal pain after discontinuing the NGT. AXR with evidence of dilated bowel loops. Brown bomb administered. The days following administration of the brown bomb, patient had more frequent bowel movements and was able to pass flatulence.     Interval History/Significant Events: NAEON. Patient reports feeling somewhat better.     Denies HA, fevers, chills, chest pain, SOB, N/V.     Review of Systems  Objective:     Vital Signs (Most Recent):  Temp: 97.8 °F (36.6 °C) (12/07/24 0301)  Pulse: 77 (12/07/24 0731)  Resp: 15 (12/07/24 0731)  BP: (!) 122/44 (12/06/24 2157)  SpO2: 96 % (12/07/24 0731) Vital Signs (24h Range):  Temp:  [97.7 °F (36.5 °C)-98.5 °F (36.9 °C)] 97.8 °F (36.6 °C)  Pulse:  [77-93] 77  Resp:  [11-36] 15  SpO2:  [96 %-100 %] 96 %  BP: ()/(44-46) 122/44  Arterial Line BP: ()/(22-46) 107/35   Weight: (!) 141 kg (310 lb 13.6 oz)  Body mass index is 41.01 kg/m².      Intake/Output Summary (Last 24 hours) at 12/7/2024 0753  Last data filed at 12/7/2024 0641  Gross per 24 hour   Intake 3043.28 ml   Output 3474 ml   Net -430.72 ml           Physical Exam  Vitals and nursing note reviewed.   Constitutional:       General: He is awake. He is not in acute distress.     Appearance: He is obese. He is ill-appearing. He is not toxic-appearing.   HENT:      Head: Normocephalic and atraumatic.   Eyes:      General: No scleral icterus.     Extraocular Movements: Extraocular movements intact.      Conjunctiva/sclera: Conjunctivae normal.   Cardiovascular:      Rate and Rhythm: Normal rate. Rhythm irregular.   Pulmonary:      Effort: Pulmonary effort is normal. No respiratory distress.   Abdominal:      General: There is distension.      Palpations: Abdomen is soft.      Tenderness: There is no abdominal tenderness. There is no guarding or rebound.   Musculoskeletal:         General: No swelling or tenderness.      Right lower leg: Edema present.      Left lower leg: Edema present.      Comments: 2+ pitting edema in bilateral lower extremities   Skin:     General: Skin is warm.      Coloration: Skin is not jaundiced.      Findings: Bruising present.   Neurological:      Mental Status: He is alert and oriented to person, place, and time.      Motor: No weakness.            Vents:     Lines/Drains/Airways       Central Venous Catheter Line  Duration             Trialysis (Dialysis) Catheter 11/24/24 0441 right internal jugular 13 days              Drain  Duration                  Urethral Catheter 12/03/24 1607 Silicone 16 Fr. 3 days              Arterial Line  Duration             Arterial Line 11/24/24 0317 Right Radial 13 days              Peripheral Intravenous Line  Duration                  Peripheral IV - Single Lumen 11/27/24 1101 20 G Anterior;Left Forearm 9 days                  Significant Labs:    CBC/Anemia Profile:  Recent Labs   Lab 12/06/24  0345 12/07/24  0319   WBC 10.04 8.64   HGB 7.3* 7.2*   HCT 23.5* 22.5*   PLT 44* 39*   MCV 79* 78*   RDW 24.6* 24.8*        Chemistries:  Recent Labs   Lab 12/05/24  0953 12/05/24  1402 12/06/24  0345  "12/06/24  1359 12/06/24  2204 12/07/24  0319   NA  --    < > 135*  133* 134* 136 134*  133*   K  --    < > 3.4*  3.3* 3.3* 3.6 3.3*  3.3*   CL  --    < > 109  107 106 108 107  106   CO2  --    < > 16*  19* 20* 21* 20*  20*   BUN  --    < > 7*  7* 8 5* 4*  4*   CREATININE  --    < > 1.7*  1.8* 2.1* 1.2 1.2  1.1   CALCIUM  --    < > 7.8*  7.8* 8.1* 7.6* 7.4*  7.5*   ALBUMIN 2.3*   < > 2.3*  2.3* 2.3* 2.3* 2.2*  2.2*   PROT 5.2*  --  5.4*  --   --  5.3*   BILITOT 3.0*  --  3.2*  --   --  2.9*   ALKPHOS 48  --  53  --   --  57   ALT 10  --  11  --   --  12   AST 21  --  19  --   --  32   MG  --    < > 2.1 2.1 1.9 2.3   PHOS  --    < > 3.8  3.7 3.2 2.1* 3.6  3.7    < > = values in this interval not displayed.       All pertinent labs within the past 24 hours have been reviewed.    Significant Imaging:  I have reviewed all pertinent imaging results/findings within the past 24 hours.    ABG  No results for input(s): "PH", "PO2", "PCO2", "HCO3", "BE" in the last 168 hours.  Assessment/Plan:     Neuro  Encephalopathy, metabolic  Resolved  AAOx4  Will assess for signs of encephalopathy     Cardiac/Vascular  Hypotension  On going problem since admission, most likely multifactorial - component of liver dysfunction, HRS, sepsis on admission.  Continues to require levo, midodrine has been up titrated.   Random cortisol was 6   Cortisol sim test - results pending      Atrial fibrillation  Holding home Eliquis in the setting of recent low blood counts  Continue metoprolol 12.5    Renal/  Stage 3a chronic kidney disease  Nephrology following  Will try to remove more fluid  Strict intake and output  Renally dose all medications  Avoid nephrotoxic agents    JAE (acute kidney injury)  Baseline creatinine is 1.5. May require dialysis long term.   MAP goal 55-60  Nephrology following for dialysis  Will attempt to remove more fluid - goal is net negative 1-2L    ID  Gram-negative bacteremia  Blood cultures from " admission with B. Diminuta, micrococcus luteus, lysinobacillus species. Seen by ID previously who recommended stopping antibiotics due to concern these were contaminants. Repeat blood cultures have been no growth. Has since been transferred to ICU with increased pressor requirement. Blood cultures repeated on 11/24 are no growth x 24 hours.   11/29 Switched from zosyn to meropenem due to concern of thrombocytopenia.   Continue meropenem.  Tentative end date for abx 12/8    Severe sepsis  This patient does have evidence of infective focus  My overall impression is sepsis.  Source: Abdominal  Antibiotics given-   Antibiotics (72h ago, onward)      Start     Stop Route Frequency Ordered    12/02/24 1800  meropenem injection 1 g         -- IV Every 12 hours (non-standard times) 12/02/24 1517          Organ dysfunction indicated by Acute kidney injury    Fluid challenge Contraindicated- Fluid bolus is contraindicated in this patient due to End Stage Liver Disease     Post- resuscitation assessment No - Post resuscitation assessment not needed     Source control achieved by: antibiotics  12/8 should be final day for abx    Hematology  Thrombocytopenia  Daily CBC  Transfuse for PLT<10k, or PLT<50K with active bleeding  Monitor for signs and symptoms of bleeding    Coagulopathy  End Stage Liver Disease precipitated coagulopathy  Heparin d/c d/t thrombocytopenia    Endocrine  Hyponatremia  Likely dilutional secondary to end stage liver disease and HRS.   CTM, correct if appropriate    GI  * Decompensated cirrhosis  MELD 3.0: 32 at 11/29/2024  9:57 PM  MELD-Na: 32 at 11/29/2024  9:57 PM  Calculated from:  Serum Creatinine: On dialysis. Using the maximum value.  Serum Sodium: 132 mmol/L at 11/29/2024  9:57 PM  Total Bilirubin: 2.9 mg/dL at 11/29/2024 10:34 AM  Serum Albumin: 2.8 g/dL at 11/29/2024  9:57 PM  INR(ratio): 2 at 11/27/2024  2:09 AM  Age at listing (hypothetical): 69 years  Sex: Male at 11/29/2024  9:57  PM    Hepatology following, pt not transplant eligible at this time.       Abdominal pain  Patient developed abdominal pain 2/2 constipation last week and NGT was placed. 11/30 NGT was removed. The day after the NGT was removed he developed constipation and dilated bowel loops on AXR.  Some BM after brown bomb enema.   Continue to monitor       Critical Care Daily Checklist:    A: Awake: RASS Goal/Actual Goal: RASS Goal: 0-->alert and calm  Actual:     B: Spontaneous Breathing Trial Performed?     C: SAT & SBT Coordinated?                     D: Delirium: CAM-ICU Overall CAM-ICU: Negative   E: Early Mobility Performed? Yes   F: Feeding Goal: Goals: to meet % of EEN/EPN by next RD f/u  Status: Nutrition Goal Status: goal not met   Current Diet Order   Procedures    Diet Renal Standard Tray     Order Specific Question:   Tray type:     Answer:   Standard Tray      AS: Analgesia/Sedation    T: Thromboembolic Prophylaxis No   H: HOB > 300 Yes   U: Stress Ulcer Prophylaxis (if needed) Yes   G: Glucose Control    B: Bowel Function Stool Occurrence: 1   I: Indwelling Catheter (Lines & Peacock) Necessity Trialysis, PIV, peacock   D: De-escalation of Antimicrobials/Pharmacotherapies Meropenem    Plan for the day/ETD Scan abdomen    Code Status:  Family/Goals of Care: Full Code         Critical secondary to Patient has a condition that poses threat to life and bodily function: Acute Renal Failure      Critical care was time spent personally by me on the following activities: development of treatment plan with patient or surrogate and bedside caregivers, discussions with consultants, evaluation of patient's response to treatment, examination of patient, ordering and performing treatments and interventions, ordering and review of laboratory studies, ordering and review of radiographic studies, pulse oximetry, re-evaluation of patient's condition. This critical care time did not overlap with that of any other provider or  involve time for any procedures.     Rhona Lewis, DO  Critical Care Medicine  [facility] - Medical ICU

## 2024-12-07 NOTE — PROGRESS NOTES
[facility]  Critical Care Medicine  Progress Note    Patient Name: Juan Carlos Yoo Sr.  MRN: [id number]  Admission Date: 11/20/2024  Hospital Length of Stay: 17 days  Code Status: Full Code  Attending Provider: Evelyn Amos MD  Primary Care Provider: Nyla Rios FNP   Principal Problem: Decompensated cirrhosis    Subjective:     HPI:  Juan Carlos Yoo is a 69 male with PMH of alcoholic cirrhosis, esophageal varices, Afib on eliquis, and HTN who presents for c/o worsening diffuse swelling as well as R arm pain/erythema. He was recently hospitalized 1 month ago at [facility] for volume overload in the setting of decompensated cirrhosis. He was treated with IV diuresis and discharged with adjustment to his diuretics, currently on lasix 60mg BID and metolazone 2.5mg every other week as per family. Patient reports diffuse swelling of his upper and lower extremities in addition to distended abdomen and worsening dyspnea, which he believes is due to recent medication adjustment. Family states he is non-compliant with low sodium diet and fluid restriction. Family states he has been compliant with diuretics, but rom't taken eliquis for 3 days due to issue getting refill. He also reports scratching right arm on door frame 3-4 days ago which has become red and painful after wrapping in in bandage. He claims to be compliant with medications, but is a poor historian. He follows with hepatology, not currently active on transplant list. He states he hasn't consumed alcohol for at least 9 months. Has c/o chills, but denies fever, cough, nausea, vomiting, chest pain, dysuria, hematuria, blood in stool. Workup in ED remarkable for VS: T 97.6 HR 94 RR 22 BP 95/56 O2 sat 97% on RA. Hb 6.7, MCV 75, Plt 81, PT/INR 22.6/2.2, Na 128, K 6.1, BUN/Cr 49/5.7, Alb 2.8, AST/ALT 35/9, Ammonia 104, , Trop neg, LA 2.9, CXR: Cardiac size is enlarged similar to prior.  No large volume of pleural fluid noted although there is mild  blunting of the right costophrenic angle which may relate to a small amount of pleural fluid.  Suspected right basilar opacity, to be correlated clinically for infection. RUE venous doppler: No thrombus in central veins of the right upper extremity. Patient received vanc/zosyn and lasix 80mg IVP in ED and will be admitted to hospital medicine service for further management.     Pt was admitted to hospital medicine. Blood cultures positive for Gram-positive cocci and Gram-negative rods. ID has been consulted. S/p 2 units PRBC for Hemoglobin dropped 6.8 on 11/21. Pt was on Eliquis for atrial fibrillation prior to admission which was held.  Hepatology following patient's clinical course, but are not evaluating him for transplant at this time. Diuretics were held because of concern for HRS.  Patient was started on albumin and midodrine per Nephrology recommendations for HRS.  Renal function continued to worsen and recommendation per Nephrology to transfer to ICU for maintaining goal map over 85 on Levophed.  ICU was notified and patient to be assessed to be transferred to ICU.       Hospital/ICU Course:  69 y.o. male with history of EtOH use disorder, EtOH cirrhosis, HCC s/p y90, morbid obesity BMI 44, HTN, and Afib who presents with severe anasarca and JAE.      Appreciate hepatology and nephrology recommendations.  Diuretics were held because of concern for HRS.  Patient was started on albumin and midodrine per Nephrology recommendations for HRS.  Renal function continued to worsen and recommendation per Nephrology to transfer to ICU for maintaining goal map over 85 on Levophed.  ICU was notified and patient to be assessed to be transferred to ICU.     Patient also has Gram-positive cocci and Gram-negative rods in his blood now.  Possible sources could be got translocation and barrier related infection through skin as he has been significantly edematous and has been oozing.  Has been on vancomycin and Rocephin.  Id  was consulted this morning.  Repeat blood cultures were obtained.     Continues to have anemia.  Hemoglobin dropped on 11/20 and was given 1 unit of packed red blood cells.  Did not respond appropriately.  Hemoglobin dropped again to 6.8 on 11/21 and was given 1 unit of packed red blood cells.  Hemoglobin again on 11/22 is 7.2.  No reported melena or hematochezia.  Patient was on Eliquis for atrial fibrillation prior to admission which was held.  Given history of esophageal varices and portal hypertensive gastropathy whereas also could be component of slow bleeding, under production due to CKD and hemolysis while also with decompensated cirrhosis.  Started on Protonix IV b.i.d. and octreotide.     Also clarified with hepatology.  Given patient's current infection we will await ID recommendations however we will be challenging to consider transplant evaluation at this time.  Trend clinical course     Goal clarification with the patient and family.  Patient at this time wants to be full code and continue with Levophed in ICU.  They would consider discussion with palliative care in a day or so if things do not get better.     In MICU patient started on Levophed, however titration remained difficult given tachycardic response from the patient.     11/24 Trialysis and arterial lines placed overnight and currently on levo and norepi. SLED today.    11/25 Boo dc. Increased midodrine. Continue steroids until d/c pressors. Discussed with Nephrology about our concern for sustained use of pressors to achieve their MAP goals of 85. Will follow up tomorrow.     11/26 Platelets 48. Repeat 38 confirming thrombocytopenia. Concern for HIT. D/c heparin. Increased lactulose dosing. Bladder scan revealed urine in bladder. Boo placed. Off vaso. Continuing levo. Maintain MAP goals 80. PT/OT consulted.     11/27 MAP goal 75-80. Heparin antibody result pending.     11/28 Pt with abdominal pain, decreased bowel movements, emesis. Lactic  "acid 2.9 and repeat 2.7. Less concerned for mesenteric ischemia and more of a concern was for SBO. NG tube placed with subsequent suction of 500mL fluid. Abdomen less distended after placement and pt subsequently had bowel movement. MAP Goal of 60 with plan to come off pressors entirely and switch to dialysis after permacath, as he is not  liver transplant eligible at this time.     11/29 Pt with ileus. Clear liquids for now. Platelets 24. HIT versus zosyn induced? Peripheral smear sent. Zosyn changed to kaylah. Consent and transfuse 1U. GOC conversation today. Pt opting for long term dialysis.     11/30 naeon. Started back on heparin. One time dose of 120mg lasix today. Hold off SLED/SCUF today and give IV lasix 120 mg once; plan for SLED/SCUF tomorrow     12/1 Patient is becoming more dependent on dialysis. Not a current liver transplant candidate. Still complaining of abdominal pain after discontinuing the NGT. AXR with evidence of dilated bowel loops. Brown bomb administered. The days following administration of the brown bomb, patient had more frequent bowel movements and was able to pass flatulence. Constipation eventually resolved.   Given the need for pressor support, he was worked up for adrenal insufficiency which was positive. Consequently, he was started on steroids for adrenal insufficiency. His blood pressures improved, however, he still required pressor support, particularly during dialysis.    No new subjective & objective note has been filed under this hospital service since the last note was generated.      ABG  No results for input(s): "PH", "PO2", "PCO2", "HCO3", "BE" in the last 168 hours.  Assessment/Plan:     Neuro  Encephalopathy, metabolic  Resolved  AAOx4  Will assess for signs of encephalopathy     Cardiac/Vascular  Hypotension  On going problem since admission, most likely multifactorial - component of liver dysfunction, HRS, sepsis on admission.  Continues to require levo, midodrine has " been up titrated.   Random cortisol was 6   Persistent hypotension thought to be adrenal insufficiency.   Continue steroids      Atrial fibrillation  Holding home Eliquis in the setting of recent low blood counts  Continue metoprolol 12.5    Renal/  Stage 3a chronic kidney disease  Nephrology following  Will try to remove more fluid  Strict intake and output  Renally dose all medications  Avoid nephrotoxic agents    JAE (acute kidney injury)  Baseline creatinine is 1.5. May require dialysis long term.   MAP goal 55-60  Nephrology following for dialysis  Will attempt to remove more fluid - goal is net negative 1-2L    ID  Gram-negative bacteremia  Blood cultures from admission with B. Diminuta, micrococcus luteus, lysinobacillus species. Seen by ID previously who recommended stopping antibiotics due to concern these were contaminants. Repeat blood cultures have been no growth. Has since been transferred to ICU with increased pressor requirement. Blood cultures repeated on 11/24 are no growth x 24 hours.   11/29 Switched from zosyn to meropenem due to concern of thrombocytopenia.   Continue meropenem.  Tentative end date for abx 12/8    Severe sepsis  This patient does have evidence of infective focus  My overall impression is sepsis.  Source: Abdominal  Antibiotics given-   Antibiotics (72h ago, onward)      Start     Stop Route Frequency Ordered    12/02/24 1800  meropenem injection 1 g         -- IV Every 12 hours (non-standard times) 12/02/24 1517          Organ dysfunction indicated by Acute kidney injury    Fluid challenge Contraindicated- Fluid bolus is contraindicated in this patient due to End Stage Liver Disease     Post- resuscitation assessment No - Post resuscitation assessment not needed     Source control achieved by: antibiotics  12/8 should be final day for abx    Hematology  Thrombocytopenia  Daily CBC  Transfuse for PLT<10k, or PLT<50K with active bleeding  Monitor for signs and symptoms of  bleeding    Coagulopathy  End Stage Liver Disease precipitated coagulopathy  Heparin d/c d/t thrombocytopenia    Endocrine  Hyponatremia  Likely dilutional secondary to end stage liver disease and HRS.   CTM, correct if appropriate    GI  * Decompensated cirrhosis  MELD 3.0: 32 at 11/29/2024  9:57 PM  MELD-Na: 32 at 11/29/2024  9:57 PM  Calculated from:  Serum Creatinine: On dialysis. Using the maximum value.  Serum Sodium: 132 mmol/L at 11/29/2024  9:57 PM  Total Bilirubin: 2.9 mg/dL at 11/29/2024 10:34 AM  Serum Albumin: 2.8 g/dL at 11/29/2024  9:57 PM  INR(ratio): 2 at 11/27/2024  2:09 AM  Age at listing (hypothetical): 69 years  Sex: Male at 11/29/2024  9:57 PM    Hepatology following, pt not transplant eligible at this time.       Abdominal pain  Patient developed abdominal pain 2/2 constipation last week and NGT was placed. 11/30 NGT was removed. The day after the NGT was removed he developed constipation and dilated bowel loops on AXR.  Some BM after brown bomb enema.   Continue to monitor       Critical Care Daily Checklist:    A: Awake: RASS Goal/Actual Goal: RASS Goal: 0-->alert and calm  Actual:     B: Spontaneous Breathing Trial Performed?     C: SAT & SBT Coordinated?                   D: Delirium: CAM-ICU Overall CAM-ICU: Negative   E: Early Mobility Performed? Yes   F: Feeding Goal: Goals: to meet % of EEN/EPN by next RD f/u  Status: Nutrition Goal Status: goal not met   Current Diet Order   Procedures    Diet Renal Standard Tray     Order Specific Question:   Tray type:     Answer:   Standard Tray      AS: Analgesia/Sedation    T: Thromboembolic Prophylaxis No   H: HOB > 300 Yes   U: Stress Ulcer Prophylaxis (if needed) Yes   G: Glucose Control    B: Bowel Function Stool Occurrence: 1   I: Indwelling Catheter (Lines & Boo) Necessity Trialysis, PIV, Boo   D: De-escalation of Antimicrobials/Pharmacotherapies Meropenem    Plan for the day/ETD Fluid removal    Code Status:  Family/Goals of  Care: Full Code         Critical secondary to Patient has a condition that poses threat to life and bodily function: Acute Renal Failure      Critical care was time spent personally by me on the following activities: development of treatment plan with patient or surrogate and bedside caregivers, discussions with consultants, evaluation of patient's response to treatment, examination of patient, ordering and performing treatments and interventions, ordering and review of laboratory studies, ordering and review of radiographic studies, pulse oximetry, re-evaluation of patient's condition. This critical care time did not overlap with that of any other provider or involve time for any procedures.     Rhona Lewis, DO  Critical Care Medicine  [facility]

## 2024-12-07 NOTE — SUBJECTIVE & OBJECTIVE
Interval History/Significant Events: NAEON. Patient reports feeling somewhat better.     Denies HA, fevers, chills, chest pain, SOB, N/V.     Review of Systems  Objective:     Vital Signs (Most Recent):  Temp: 97.8 °F (36.6 °C) (12/07/24 0301)  Pulse: 77 (12/07/24 0731)  Resp: 15 (12/07/24 0731)  BP: (!) 122/44 (12/06/24 2157)  SpO2: 96 % (12/07/24 0731) Vital Signs (24h Range):  Temp:  [97.7 °F (36.5 °C)-98.5 °F (36.9 °C)] 97.8 °F (36.6 °C)  Pulse:  [77-93] 77  Resp:  [11-36] 15  SpO2:  [96 %-100 %] 96 %  BP: ()/(44-46) 122/44  Arterial Line BP: ()/(22-46) 107/35   Weight: (!) 141 kg (310 lb 13.6 oz)  Body mass index is 41.01 kg/m².      Intake/Output Summary (Last 24 hours) at 12/7/2024 0753  Last data filed at 12/7/2024 0641  Gross per 24 hour   Intake 3043.28 ml   Output 3474 ml   Net -430.72 ml          Physical Exam  Vitals and nursing note reviewed.   Constitutional:       General: He is awake. He is not in acute distress.     Appearance: He is obese. He is ill-appearing. He is not toxic-appearing.   HENT:      Head: Normocephalic and atraumatic.   Eyes:      General: No scleral icterus.     Extraocular Movements: Extraocular movements intact.      Conjunctiva/sclera: Conjunctivae normal.   Cardiovascular:      Rate and Rhythm: Normal rate. Rhythm irregular.   Pulmonary:      Effort: Pulmonary effort is normal. No respiratory distress.   Abdominal:      General: There is distension.      Palpations: Abdomen is soft.      Tenderness: There is no abdominal tenderness. There is no guarding or rebound.   Musculoskeletal:         General: No swelling or tenderness.      Right lower leg: Edema present.      Left lower leg: Edema present.      Comments: 2+ pitting edema in bilateral lower extremities   Skin:     General: Skin is warm.      Coloration: Skin is not jaundiced.      Findings: Bruising present.   Neurological:      Mental Status: He is alert and oriented to person, place, and time.       Motor: No weakness.            Vents:     Lines/Drains/Airways       Central Venous Catheter Line  Duration             Trialysis (Dialysis) Catheter 11/24/24 0441 right internal jugular 13 days              Drain  Duration                  Urethral Catheter 12/03/24 1607 Silicone 16 Fr. 3 days              Arterial Line  Duration             Arterial Line 11/24/24 0317 Right Radial 13 days              Peripheral Intravenous Line  Duration                  Peripheral IV - Single Lumen 11/27/24 1101 20 G Anterior;Left Forearm 9 days                  Significant Labs:    CBC/Anemia Profile:  Recent Labs   Lab 12/06/24 0345 12/07/24 0319   WBC 10.04 8.64   HGB 7.3* 7.2*   HCT 23.5* 22.5*   PLT 44* 39*   MCV 79* 78*   RDW 24.6* 24.8*        Chemistries:  Recent Labs   Lab 12/05/24  0953 12/05/24  1402 12/06/24  0345 12/06/24  1359 12/06/24  2204 12/07/24 0319   NA  --    < > 135*  133* 134* 136 134*  133*   K  --    < > 3.4*  3.3* 3.3* 3.6 3.3*  3.3*   CL  --    < > 109  107 106 108 107  106   CO2  --    < > 16*  19* 20* 21* 20*  20*   BUN  --    < > 7*  7* 8 5* 4*  4*   CREATININE  --    < > 1.7*  1.8* 2.1* 1.2 1.2  1.1   CALCIUM  --    < > 7.8*  7.8* 8.1* 7.6* 7.4*  7.5*   ALBUMIN 2.3*   < > 2.3*  2.3* 2.3* 2.3* 2.2*  2.2*   PROT 5.2*  --  5.4*  --   --  5.3*   BILITOT 3.0*  --  3.2*  --   --  2.9*   ALKPHOS 48  --  53  --   --  57   ALT 10  --  11  --   --  12   AST 21  --  19  --   --  32   MG  --    < > 2.1 2.1 1.9 2.3   PHOS  --    < > 3.8  3.7 3.2 2.1* 3.6  3.7    < > = values in this interval not displayed.       All pertinent labs within the past 24 hours have been reviewed.    Significant Imaging:  I have reviewed all pertinent imaging results/findings within the past 24 hours.

## 2024-12-07 NOTE — ASSESSMENT & PLAN NOTE
On going problem since admission, most likely multifactorial - component of liver dysfunction, HRS, sepsis on admission.  Continues to require levo, midodrine has been up titrated.   Random cortisol was 6   Persistent hypotension thought to be adrenal insufficiency.   Continue steroids

## 2024-12-07 NOTE — PROGRESS NOTES
[facility]  Nephrology  Progress Note    Patient Name: Juan Carlos Yoo Sr.  MRN: [id number]  Admission Date: 11/20/2024  Hospital Length of Stay: 17 days  Attending Provider: Evelyn Amos MD   Primary Care Physician: Nyla Rios FNP  Principal Problem:Decompensated cirrhosis    Subjective:     HPI: Juan Carlos Yoo is a 69 year old male with hx of decompensated hepatic cirrhosis due to alcohol use, HCC s/p Y90, esophageal varices, persistent afib on eliquis, HTN, CKD3a (baseline creatinine 1.2-1.4) admitted on 11/20 for sepsis likely 2/2 SSTI involving RUE and decompensated hepatic cirrhosis with signs of volume overload. Recent admission 10/4 at [facility] for decompensated hepatic cirrhosis where he was discharged with oral diuretic regimen including furosemide 60 mg BID and metolazone 2.5 mg daily, low salt diet with fluid restriction. It is unclear if patient is adherent to these medications or lifestyle modifications. W/u notable for anemia with hb 6.7 s/p 1 unit pRBC 11/20 and JAE on CKD w elevated BUN 49/creatinine 5.9, hyperkalemia (K 6.1>5.1 after shifting), bicarb 18, elevated lactate up to 3.5. Nephrology consulted for JAE with c/o HRS    Interval History: S/p SLED 8 hrs last night, tolerated well. 3.4 L removed. On Levophed this morning.     Review of patient's allergies indicates:  No Known Allergies  Current Facility-Administered Medications   Medication Frequency    albuterol-ipratropium 2.5 mg-0.5 mg/3 mL nebulizer solution 3 mL Q6H PRN    aluminum-magnesium hydroxide-simethicone 200-200-20 mg/5 mL suspension 30 mL QID PRN    dextrose 10% bolus 125 mL 125 mL PRN    dextrose 10% bolus 250 mL 250 mL PRN    glucagon (human recombinant) injection 1 mg PRN    glucose chewable tablet 16 g PRN    glucose chewable tablet 24 g PRN    hydrocortisone tablet 15 mg Before breakfast    And    hydrocortisone tablet 10 mg Daily    lactulose 20 gram/30 mL solution Soln 30 g TID    melatonin tablet 6 mg Nightly  PRN    meropenem injection 1 g Q12H    metoprolol tartrate (LOPRESSOR) split tablet 12.5 mg BID    midodrine tablet 20 mg Q8H    naloxone 0.4 mg/mL injection 0.02 mg PRN    NORepinephrine 4 mg in sodium chloride 0.9% 250 mL infusion (premix) Continuous    ondansetron injection 4 mg Q8H PRN    pantoprazole EC tablet 40 mg Daily    simethicone chewable tablet 80 mg QID PRN    sodium chloride 0.9% flush 10 mL Q12H PRN    sodium chloride 0.9% flush 10 mL PRN    tamsulosin 24 hr capsule 0.4 mg QHS       Objective:     Vital Signs (Most Recent):  Temp: 97.4 °F (36.3 °C) (12/07/24 1100)  Pulse: 78 (12/07/24 1200)  Resp: 12 (12/07/24 1200)  BP: (!) 103/36 (12/07/24 0857)  SpO2: 98 % (12/07/24 1200) Vital Signs (24h Range):  Temp:  [97.4 °F (36.3 °C)-99.1 °F (37.3 °C)] 97.4 °F (36.3 °C)  Pulse:  [] 78  Resp:  [11-36] 12  SpO2:  [96 %-100 %] 98 %  BP: (103-122)/(36-44) 103/36  Arterial Line BP: ()/(22-49) 108/36     Weight: (!) 141 kg (310 lb 13.6 oz) (12/07/24 0501)  Body mass index is 41.01 kg/m².  Body surface area is 2.69 meters squared.    I/O last 3 completed shifts:  In: 4624.1 [I.V.:4126.8; IV Piggyback:497.3]  Out: 4697 [Urine:90; Other:4607]     Physical Exam  Constitutional:       General: He is not in acute distress.     Appearance: He is ill-appearing. He is not toxic-appearing.   HENT:      Head: Normocephalic and atraumatic.   Eyes:      Extraocular Movements: Extraocular movements intact.      Pupils: Pupils are equal, round, and reactive to light.   Cardiovascular:      Rate and Rhythm: Normal rate.   Pulmonary:      Effort: No respiratory distress.      Breath sounds: No wheezing or rales.   Abdominal:      General: There is distension.      Tenderness: There is no abdominal tenderness. There is no guarding.   Musculoskeletal:         General: Swelling present.      Right lower leg: Edema present.      Left lower leg: Edema present.   Skin:     Coloration: Skin is pale. Skin is not jaundiced.    Neurological:      Mental Status: He is oriented to person, place, and time.   Psychiatric:         Behavior: Behavior normal.          Significant Labs:  CBC:   Recent Labs   Lab 12/07/24 0319   WBC 8.64   RBC 2.90*   HGB 7.2*   HCT 22.5*   PLT 39*   MCV 78*   MCH 24.8*   MCHC 32.0     CMP:   Recent Labs   Lab 12/07/24  0319 12/07/24  1421   *  121* 150*   CALCIUM 7.4*  7.5* 7.8*   ALBUMIN 2.2*  2.2* 2.2*   PROT 5.3*  --    *  133* 132*   K 3.3*  3.3* 3.4*   CO2 20*  20* 18*     106 107   BUN 4*  4* 5*   CREATININE 1.2  1.1 1.8*   ALKPHOS 57  --    ALT 12  --    AST 32  --    BILITOT 2.9*  --      LFTs:   Recent Labs   Lab 12/07/24 0319 12/07/24  1421   ALT 12  --    AST 32  --    ALKPHOS 57  --    BILITOT 2.9*  --    PROT 5.3*  --    ALBUMIN 2.2*  2.2* 2.2*         Assessment/Plan:   Renal/  JAE (acute kidney injury)  JAE is likely due to pre-renal azotemia due to intravascular volume depletion secondary to decompensated hepatic cirrhosis with volume overload and acute tubular necrosis caused by hemodynamic instability, renal hypoperfusion, severe sepsis with GNR bacteremia. Initial presentation concerning for hepatorenal syndrome, transferred to MICU for vasopressors without success in reaching MAP goal 85-90 for treatment of HRS. Currently, patient with oligouric JAE more contributed by ATN as above.      -Plan for 12 hrs of SCUF tonight for volume removal 350 - 400 cc/hr, targeting approx 2 L removal. No UOP.   -Ok to DC midodrine.   -On Levophed.  Map ranges from (55-65) while on pressors  -Avoid nephrotoxins and renally dose meds for GFR listed above  -Monitor urine output, serial BMP, and adjust therapy as needed    Thank you for your consult. I will follow-up with patient. Please contact us if you have any additional questions.    Renard Melton MD  Nephrology  [facility] - Medical ICU

## 2024-12-07 NOTE — PLAN OF CARE
MICU DAILY GOALS     Family/Goals of care/Code Status   Code Status: Full Code    24H Vital Sign Range  Temp:  [97.7 °F (36.5 °C)-98.5 °F (36.9 °C)]   Pulse:  [77-93]   Resp:  [11-36]   BP: ()/(44-46)   SpO2:  [95 %-100 %]   Arterial Line BP: ()/(22-46)      Shift Events (include procedures and significant events)   No acute events throughout shift. 2 big BM. Up to bedside commode. 9.5 hours of SLED completed. Levo currently at 0.06.    AWAKE RASS: Goal - RASS Goal: 0-->alert and calm  Actual - RASS (Pedraza Agitation-Sedation Scale): alert and calm    Restraint necessity: Not necessary   BREATHE SBT: Not intubated    Coordinate A & B, analgesics/sedatives Pain: managed   SAT: Not intubated   Delirium CAM-ICU: Overall CAM-ICU: Negative   Early(intubated/ Progressive (non-intubated) Mobility MOVE Screen (INTUBATED ONLY): Not intubated    Activity: Activity Management: Up to bedside commode - L3   Feeding/Nutrition Diet order: Diet/Nutrition Received: low saturated fat/low cholesterol, Specialty Diet/Nutrition Received: renal diet   Thrombus DVT prophylaxis: VTE Core Measure: (SCDs) Sequential compression device initiated/maintained   HOB Elevation Head of Bed (HOB) Positioning: HOB at 30-45 degrees   Ulcer Prophylaxis GI: yes   Glucose control managed Glycemic Management: blood glucose monitored   Skin Skin assessment:     Sacrum intact/not altered? Yes  Heels intact/not altered? Yes  Surgical wound? No    CHECK ONE!   (no altered skin or altered skin) and sub boxes:  [] No Altered Skin Integrity Present    []Prevention Measures Documented    [x] Altered Skin Integrity Present or Discovered   [x] LDA present in EPIC, daily doc completed              [x] LDA added if not in EPIC (describe wound).                    When describing wound, do not stage, use descriptive words only.    [] Wound Image Taken (required on admit,                   transfer/discharge and every Tuesday)    Wound Care Consulted? No    Bowel Function diarrhea    Indwelling Catheter Necessity [REMOVED]      Urethral Catheter 11/26/24 1738-Reason for Continuing Urinary Catheterization: Urinary retention       Urethral Catheter 12/03/24 1607 Silicone 16 Fr.-Reason for Continuing Urinary Catheterization: Critically ill in ICU and requiring hourly monitoring of intake/output  [REMOVED]      Urethral Catheter 11/20/24 1802 Double-lumen-Reason for Continuing Urinary Catheterization: Urinary retention    Trialysis (Dialysis) Catheter 11/24/24 0441 right internal jugular-Line Necessity Review: CRRT/HD  yes   De-escalation Antibiotics No        VS and assessment per flow sheet, patient progressing towards goals as tolerated, plan of care reviewed with Juan Carlos Yoo, all concerns addressed, will continue to monitor.

## 2024-12-08 LAB
ALBUMIN SERPL BCP-MCNC: 2.3 G/DL (ref 3.5–5.2)
ALP SERPL-CCNC: 60 U/L (ref 40–150)
ALT SERPL W/O P-5'-P-CCNC: 17 U/L (ref 10–44)
ANION GAP SERPL CALC-SCNC: 10 MMOL/L (ref 8–16)
ANION GAP SERPL CALC-SCNC: 7 MMOL/L (ref 8–16)
ANION GAP SERPL CALC-SCNC: 9 MMOL/L (ref 8–16)
ANISOCYTOSIS BLD QL SMEAR: SLIGHT
AST SERPL-CCNC: 34 U/L (ref 10–40)
BASOPHILS # BLD AUTO: 0.04 K/UL (ref 0–0.2)
BASOPHILS NFR BLD: 0.5 % (ref 0–1.9)
BILIRUB SERPL-MCNC: 2.7 MG/DL (ref 0.1–1)
BUN SERPL-MCNC: 6 MG/DL (ref 8–23)
BUN SERPL-MCNC: 7 MG/DL (ref 8–23)
BUN SERPL-MCNC: 8 MG/DL (ref 8–23)
BURR CELLS BLD QL SMEAR: ABNORMAL
CALCIUM SERPL-MCNC: 8.3 MG/DL (ref 8.7–10.5)
CALCIUM SERPL-MCNC: 8.4 MG/DL (ref 8.7–10.5)
CHLORIDE SERPL-SCNC: 106 MMOL/L (ref 95–110)
CHLORIDE SERPL-SCNC: 107 MMOL/L (ref 95–110)
CO2 SERPL-SCNC: 15 MMOL/L (ref 23–29)
CO2 SERPL-SCNC: 16 MMOL/L (ref 23–29)
CO2 SERPL-SCNC: 19 MMOL/L (ref 23–29)
CREAT SERPL-MCNC: 2.1 MG/DL (ref 0.5–1.4)
CREAT SERPL-MCNC: 2.4 MG/DL (ref 0.5–1.4)
CREAT SERPL-MCNC: 2.5 MG/DL (ref 0.5–1.4)
DACRYOCYTES BLD QL SMEAR: ABNORMAL
DIFFERENTIAL METHOD BLD: ABNORMAL
EOSINOPHIL # BLD AUTO: 0.1 K/UL (ref 0–0.5)
EOSINOPHIL NFR BLD: 0.7 % (ref 0–8)
ERYTHROCYTE [DISTWIDTH] IN BLOOD BY AUTOMATED COUNT: 25 % (ref 11.5–14.5)
EST. GFR  (NO RACE VARIABLE): 26.8 ML/MIN/1.73 M^2
EST. GFR  (NO RACE VARIABLE): 28.1 ML/MIN/1.73 M^2
EST. GFR  (NO RACE VARIABLE): 33 ML/MIN/1.73 M^2
GLUCOSE SERPL-MCNC: 109 MG/DL (ref 70–110)
GLUCOSE SERPL-MCNC: 117 MG/DL (ref 70–110)
GLUCOSE SERPL-MCNC: 117 MG/DL (ref 70–110)
GLUCOSE SERPL-MCNC: 118 MG/DL (ref 70–110)
GLUCOSE SERPL-MCNC: 122 MG/DL (ref 70–110)
HCT VFR BLD AUTO: 22.7 % (ref 40–54)
HGB BLD-MCNC: 7.2 G/DL (ref 14–18)
HYPOCHROMIA BLD QL SMEAR: ABNORMAL
IMM GRANULOCYTES # BLD AUTO: 0.02 K/UL (ref 0–0.04)
IMM GRANULOCYTES NFR BLD AUTO: 0.3 % (ref 0–0.5)
LYMPHOCYTES # BLD AUTO: 0.6 K/UL (ref 1–4.8)
LYMPHOCYTES NFR BLD: 8 % (ref 18–48)
MAGNESIUM SERPL-MCNC: 1.9 MG/DL (ref 1.6–2.6)
MAGNESIUM SERPL-MCNC: 2 MG/DL (ref 1.6–2.6)
MAGNESIUM SERPL-MCNC: 2 MG/DL (ref 1.6–2.6)
MCH RBC QN AUTO: 25.1 PG (ref 27–31)
MCHC RBC AUTO-ENTMCNC: 31.7 G/DL (ref 32–36)
MCV RBC AUTO: 79 FL (ref 82–98)
MONOCYTES # BLD AUTO: 1 K/UL (ref 0.3–1)
MONOCYTES NFR BLD: 12.8 % (ref 4–15)
NEUTROPHILS # BLD AUTO: 5.9 K/UL (ref 1.8–7.7)
NEUTROPHILS NFR BLD: 77.7 % (ref 38–73)
NRBC BLD-RTO: 0 /100 WBC
PHOSPHATE SERPL-MCNC: 3.4 MG/DL (ref 2.7–4.5)
PHOSPHATE SERPL-MCNC: 3.7 MG/DL (ref 2.7–4.5)
PHOSPHATE SERPL-MCNC: 4.1 MG/DL (ref 2.7–4.5)
PLATELET # BLD AUTO: 32 K/UL (ref 150–450)
PLATELET BLD QL SMEAR: ABNORMAL
PMV BLD AUTO: ABNORMAL FL (ref 9.2–12.9)
POIKILOCYTOSIS BLD QL SMEAR: ABNORMAL
POLYCHROMASIA BLD QL SMEAR: ABNORMAL
POTASSIUM SERPL-SCNC: 3.7 MMOL/L (ref 3.5–5.1)
POTASSIUM SERPL-SCNC: 3.9 MMOL/L (ref 3.5–5.1)
PROT SERPL-MCNC: 5.4 G/DL (ref 6–8.4)
RBC # BLD AUTO: 2.87 M/UL (ref 4.6–6.2)
SCHISTOCYTES BLD QL SMEAR: ABNORMAL
SCHISTOCYTES BLD QL SMEAR: PRESENT
SODIUM SERPL-SCNC: 132 MMOL/L (ref 136–145)
WBC # BLD AUTO: 7.58 K/UL (ref 3.9–12.7)

## 2024-12-08 PROCEDURE — 25000003 PHARM REV CODE 250

## 2024-12-08 PROCEDURE — 80053 COMPREHEN METABOLIC PANEL: CPT

## 2024-12-08 PROCEDURE — 94761 N-INVAS EAR/PLS OXIMETRY MLT: CPT

## 2024-12-08 PROCEDURE — 90945 DIALYSIS ONE EVALUATION: CPT

## 2024-12-08 PROCEDURE — 25000003 PHARM REV CODE 250: Performed by: INTERNAL MEDICINE

## 2024-12-08 PROCEDURE — 83735 ASSAY OF MAGNESIUM: CPT | Performed by: STUDENT IN AN ORGANIZED HEALTH CARE EDUCATION/TRAINING PROGRAM

## 2024-12-08 PROCEDURE — 84100 ASSAY OF PHOSPHORUS: CPT

## 2024-12-08 PROCEDURE — 85025 COMPLETE CBC W/AUTO DIFF WBC: CPT

## 2024-12-08 PROCEDURE — 63600175 PHARM REV CODE 636 W HCPCS: Performed by: INTERNAL MEDICINE

## 2024-12-08 PROCEDURE — 80069 RENAL FUNCTION PANEL: CPT | Mod: 91 | Performed by: STUDENT IN AN ORGANIZED HEALTH CARE EDUCATION/TRAINING PROGRAM

## 2024-12-08 PROCEDURE — 99233 SBSQ HOSP IP/OBS HIGH 50: CPT | Mod: ,,, | Performed by: HOSPITALIST

## 2024-12-08 PROCEDURE — 20000000 HC ICU ROOM

## 2024-12-08 PROCEDURE — 63600175 PHARM REV CODE 636 W HCPCS: Performed by: STUDENT IN AN ORGANIZED HEALTH CARE EDUCATION/TRAINING PROGRAM

## 2024-12-08 PROCEDURE — 25000003 PHARM REV CODE 250: Performed by: STUDENT IN AN ORGANIZED HEALTH CARE EDUCATION/TRAINING PROGRAM

## 2024-12-08 RX ORDER — HYDROCODONE BITARTRATE AND ACETAMINOPHEN 500; 5 MG/1; MG/1
TABLET ORAL CONTINUOUS
Status: ACTIVE | OUTPATIENT
Start: 2024-12-08 | End: 2024-12-09

## 2024-12-08 RX ORDER — MAGNESIUM SULFATE HEPTAHYDRATE 40 MG/ML
2 INJECTION, SOLUTION INTRAVENOUS
Status: DISPENSED | OUTPATIENT
Start: 2024-12-08 | End: 2024-12-09

## 2024-12-08 RX ADMIN — METOPROLOL TARTRATE 12.5 MG: 25 TABLET, FILM COATED ORAL at 09:12

## 2024-12-08 RX ADMIN — HYDROCORTISONE 15 MG: 5 TABLET ORAL at 06:12

## 2024-12-08 RX ADMIN — MIDODRINE HYDROCHLORIDE 20 MG: 5 TABLET ORAL at 06:12

## 2024-12-08 RX ADMIN — SODIUM CHLORIDE: 9 INJECTION, SOLUTION INTRAVENOUS at 04:12

## 2024-12-08 RX ADMIN — PANTOPRAZOLE SODIUM 40 MG: 40 TABLET, DELAYED RELEASE ORAL at 09:12

## 2024-12-08 RX ADMIN — MEROPENEM 1 G: 1 INJECTION, POWDER, FOR SOLUTION INTRAVENOUS at 05:12

## 2024-12-08 RX ADMIN — MAGNESIUM SULFATE HEPTAHYDRATE 2 G: 40 INJECTION, SOLUTION INTRAVENOUS at 10:12

## 2024-12-08 RX ADMIN — TAMSULOSIN HYDROCHLORIDE 0.4 MG: 0.4 CAPSULE ORAL at 09:12

## 2024-12-08 RX ADMIN — Medication 6 MG: at 09:12

## 2024-12-08 RX ADMIN — MEROPENEM 1 G: 1 INJECTION, POWDER, FOR SOLUTION INTRAVENOUS at 06:12

## 2024-12-08 RX ADMIN — NOREPINEPHRINE BITARTRATE 0.02 MCG/KG/MIN: 16 SOLUTION INTRAVENOUS at 01:12

## 2024-12-08 RX ADMIN — LACTULOSE 30 G: 20 SOLUTION ORAL at 09:12

## 2024-12-08 RX ADMIN — MIDODRINE HYDROCHLORIDE 20 MG: 5 TABLET ORAL at 09:12

## 2024-12-08 RX ADMIN — LACTULOSE 30 G: 20 SOLUTION ORAL at 03:12

## 2024-12-08 RX ADMIN — MIDODRINE HYDROCHLORIDE 20 MG: 5 TABLET ORAL at 01:12

## 2024-12-08 RX ADMIN — HYDROCORTISONE 10 MG: 5 TABLET ORAL at 01:12

## 2024-12-08 NOTE — SUBJECTIVE & OBJECTIVE
Interval History: Juan Cralos laying in bed this morning. Tolerated SCUF last night, however d/t drips still overall net positive.     Review of patient's allergies indicates:  No Known Allergies  Current Facility-Administered Medications   Medication Frequency    0.9%  NaCl infusion (CRRT USE ONLY) Continuous    0.9%  NaCl infusion (CRRT USE ONLY) Continuous    albuterol-ipratropium 2.5 mg-0.5 mg/3 mL nebulizer solution 3 mL Q6H PRN    aluminum-magnesium hydroxide-simethicone 200-200-20 mg/5 mL suspension 30 mL QID PRN    dextrose 10% bolus 125 mL 125 mL PRN    dextrose 10% bolus 250 mL 250 mL PRN    glucagon (human recombinant) injection 1 mg PRN    glucose chewable tablet 16 g PRN    glucose chewable tablet 24 g PRN    hydrocortisone tablet 15 mg Before breakfast    And    hydrocortisone tablet 10 mg Daily    lactulose 20 gram/30 mL solution Soln 30 g TID    magnesium sulfate 2g in water 50mL IVPB (premix) PRN    melatonin tablet 6 mg Nightly PRN    meropenem injection 1 g Q12H    metoprolol tartrate (LOPRESSOR) split tablet 12.5 mg BID    midodrine tablet 20 mg Q8H    naloxone 0.4 mg/mL injection 0.02 mg PRN    NORepinephrine 4 mg in sodium chloride 0.9% 250 mL infusion (premix) Continuous    ondansetron injection 4 mg Q8H PRN    pantoprazole EC tablet 40 mg Daily    simethicone chewable tablet 80 mg QID PRN    sodium chloride 0.9% flush 10 mL Q12H PRN    sodium chloride 0.9% flush 10 mL PRN    tamsulosin 24 hr capsule 0.4 mg QHS       Objective:     Vital Signs (Most Recent):  Temp: 97.5 °F (36.4 °C) (12/08/24 1500)  Pulse: 77 (12/08/24 1500)  Resp: 13 (12/08/24 1500)  BP: (!) 109/42 (12/08/24 0935)  SpO2: 97 % (12/08/24 1500) Vital Signs (24h Range):  Temp:  [96.8 °F (36 °C)-99.1 °F (37.3 °C)] 97.5 °F (36.4 °C)  Pulse:  [75-90] 77  Resp:  [12-19] 13  SpO2:  [95 %-98 %] 97 %  BP: (109-122)/(41-42) 109/42  Arterial Line BP: ()/(35-50) 112/39     Weight: (!) 141 kg (310 lb 13.6 oz) (12/07/24 0501)  Body mass index  is 41.01 kg/m².  Body surface area is 2.69 meters squared.    I/O last 3 completed shifts:  In: 7015.2 [P.O.:1200; I.V.:5567.3; IV Piggyback:247.9]  Out: 6413 [Urine:125; Other:6288]     Physical Exam  Constitutional:       General: He is not in acute distress.     Appearance: He is ill-appearing. He is not toxic-appearing.   HENT:      Head: Normocephalic and atraumatic.   Eyes:      Extraocular Movements: Extraocular movements intact.      Pupils: Pupils are equal, round, and reactive to light.   Cardiovascular:      Rate and Rhythm: Normal rate.   Pulmonary:      Effort: No respiratory distress.      Breath sounds: No wheezing or rales.   Abdominal:      General: There is distension.      Tenderness: There is no abdominal tenderness. There is no guarding.   Musculoskeletal:         General: Swelling present.      Right lower leg: Edema present.      Left lower leg: Edema present.   Skin:     Coloration: Skin is pale. Skin is not jaundiced.   Neurological:      Mental Status: He is oriented to person, place, and time.   Psychiatric:         Behavior: Behavior normal.          Significant Labs:  CBC:   Recent Labs   Lab 12/08/24 0307   WBC 7.58   RBC 2.87*   HGB 7.2*   HCT 22.7*   PLT 32*   MCV 79*   MCH 25.1*   MCHC 31.7*     CMP:   Recent Labs   Lab 12/08/24 0307 12/08/24  1320   *  117*  118* 122*   CALCIUM 8.3*  8.3*  8.3* 8.4*   ALBUMIN 2.3*  2.3*  2.3* 2.3*   PROT 5.4*  --    *  132*  132* 132*   K 3.9  3.9  3.9 3.7   CO2 19*  19*  19* 15*     106  106 107   BUN 6*  6*  6* 7*   CREATININE 2.1*  2.1*  2.1* 2.5*   ALKPHOS 60  --    ALT 17  --    AST 34  --    BILITOT 2.7*  --      LFTs:   Recent Labs   Lab 12/08/24  0307 12/08/24  1320   ALT 17  --    AST 34  --    ALKPHOS 60  --    BILITOT 2.7*  --    PROT 5.4*  --    ALBUMIN 2.3*  2.3*  2.3* 2.3*

## 2024-12-08 NOTE — PROGRESS NOTES
12/08/24 1635   Treatment   Treatment Type SCUF   Treatment Status Restart   Dialysis Machine Number [id number]   Solutions Labeled and Current  Yes   Access Temporary Cath;Right;IJ   Catheter Dressing Intact  Yes   Alarms Engaged Yes   CRRT Comments Scuf tx initiated     Report given to primary nurse.

## 2024-12-08 NOTE — PLAN OF CARE
MICU DAILY GOALS     Family/Goals of care/Code Status   Code Status: Full Code    24H Vital Sign Range  Temp:  [96.8 °F (36 °C)-99.1 °F (37.3 °C)]   Pulse:  []   Resp:  [12-22]   BP: (103-122)/(36-41)   SpO2:  [95 %-99 %]   Arterial Line BP: ()/(35-50)      Shift Events (include procedures and significant events)   No acute events throughout shift. 12H SCUF done. 1 BM. Levo at 0.01.    AWAKE RASS: Goal - RASS Goal: 0-->alert and calm  Actual - RASS (Pedraza Agitation-Sedation Scale): alert and calm    Restraint necessity: Not necessary   BREATHE SBT: Not intubated    Coordinate A & B, analgesics/sedatives Pain: managed   SAT: Not intubated   Delirium CAM-ICU: Overall CAM-ICU: Negative   Early(intubated/ Progressive (non-intubated) Mobility MOVE Screen (INTUBATED ONLY): Not intubated    Activity: Activity Management: Arm raise - L1, Rolling - L1   Feeding/Nutrition Diet order: Diet/Nutrition Received: low saturated fat/low cholesterol, Specialty Diet/Nutrition Received: renal diet   Thrombus DVT prophylaxis: VTE Core Measure: (SCDs) Sequential compression device initiated/maintained   HOB Elevation Head of Bed (HOB) Positioning: HOB at 30-45 degrees   Ulcer Prophylaxis GI: yes   Glucose control managed Glycemic Management: oral hydration promoted   Skin Skin assessment:     Sacrum intact/not altered? Yes  Heels intact/not altered? Yes  Surgical wound? No    CHECK ONE!   (no altered skin or altered skin) and sub boxes:  [] No Altered Skin Integrity Present    []Prevention Measures Documented    [x] Altered Skin Integrity Present or Discovered   [x] LDA present in EPIC, daily doc completed              [] LDA added if not in EPIC (describe wound).                    When describing wound, do not stage, use descriptive words only.    [] Wound Image Taken (required on admit,                   transfer/discharge and every Tuesday)    Wound Care Consulted? No   Bowel Function diarrhea    Indwelling Catheter  Necessity [REMOVED]      Urethral Catheter 11/26/24 1738-Reason for Continuing Urinary Catheterization: Urinary retention       Urethral Catheter 12/03/24 1607 Silicone 16 Fr.-Reason for Continuing Urinary Catheterization: Critically ill in ICU and requiring hourly monitoring of intake/output  [REMOVED]      Urethral Catheter 11/20/24 1802 Double-lumen-Reason for Continuing Urinary Catheterization: Urinary retention    Trialysis (Dialysis) Catheter 11/24/24 0441 right internal jugular-Line Necessity Review: CRRT/HD  yes   De-escalation Antibiotics No        VS and assessment per flow sheet, patient progressing towards goals as tolerated, plan of care reviewed with Juan Carlos Yoo, all concerns addressed, will continue to monitor.

## 2024-12-08 NOTE — NURSING
MICU DAILY GOALS     Family/Goals of care/Code Status   Code Status: Full Code    24H Vital Sign Range  Temp:  [96.8 °F (36 °C)-99.1 °F (37.3 °C)]   Pulse:  [75-90]   Resp:  [12-19]   BP: (109-122)/(41-42)   SpO2:  [95 %-98 %]   Arterial Line BP: ()/(35-44)      Shift Events (include procedures and significant events)   On SCUF for 24 hours. Removed peacock. Levo gtt infusing to maintain MAP of 55-60.    AWAKE RASS: Goal - RASS Goal: 0-->alert and calm  Actual - RASS (Pedraza Agitation-Sedation Scale): alert and calm    Restraint necessity: Not necessary   BREATHE SBT: Not intubated    Coordinate A & B, analgesics/sedatives Pain: managed   SAT: Not intubated   Delirium CAM-ICU: Overall CAM-ICU: Negative   Early(intubated/ Progressive (non-intubated) Mobility MOVE Screen (INTUBATED ONLY): Not intubated    Activity: Activity Management: Arm raise - L1, Leg kicks - L2   Feeding/Nutrition Diet order: Diet/Nutrition Received: low saturated fat/low cholesterol, Specialty Diet/Nutrition Received: renal diet   Thrombus DVT prophylaxis: VTE Core Measure: (SCDs) Sequential compression device initiated/maintained   HOB Elevation Head of Bed (HOB) Positioning: HOB elevated   Ulcer Prophylaxis GI: yes   Glucose control managed Glycemic Management: blood glucose monitored   Skin Skin assessment:     Sacrum intact/not altered? Yes  Heels intact/not altered? Yes  Surgical wound? No    CHECK ONE!   (no altered skin or altered skin) and sub boxes:  [] No Altered Skin Integrity Present    []Prevention Measures Documented    [x] Altered Skin Integrity Present or Discovered   [x] LDA present in EPIC, daily doc completed              [] LDA added if not in EPIC (describe wound).                    When describing wound, do not stage, use descriptive words only.    [] Wound Image Taken (required on admit,                   transfer/discharge and every Tuesday)    Wound Care Consulted? Yes   Bowel Function no issues    Indwelling  Catheter Necessity [REMOVED]      Urethral Catheter 11/26/24 1738-Reason for Continuing Urinary Catheterization: Urinary retention  [REMOVED]      Urethral Catheter 12/03/24 1607 Silicone 16 Fr.-Reason for Continuing Urinary Catheterization: Critically ill in ICU and requiring hourly monitoring of intake/output  [REMOVED]      Urethral Catheter 11/20/24 1802 Double-lumen-Reason for Continuing Urinary Catheterization: Urinary retention    Trialysis (Dialysis) Catheter 11/24/24 0441 right internal jugular-Line Necessity Review: CRRT/HD, Hemodynamic instability     De-escalation Antibiotics No        VS and assessment per flow sheet, patient progressing towards goals as tolerated, plan of care reviewed with  pt Juan Carlos Yoo , all concerns addressed, will continue to monitor.

## 2024-12-08 NOTE — PROGRESS NOTES
[facility] - Medical ICU  Nephrology  Progress Note    Patient Name: Juan Carlos Yoo Sr.  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 Recent admission 10/4 at [facility] for decompensated hepatic cirrhosis where he was discharged with oral diuretic regimen including furosemide 60 mg BID and metolazone 2.5 mg daily, low salt diet with fluid restriction. It is unclear if patient is adherent to these medications or lifestyle modifications. W/u notable for anemia with hb 6.7 s/p 1 unit pRBC 11/20 and JAE on CKD w elevated BUN 49/creatinine 5.9, hyperkalemia (K 6.1>5.1 after shifting), bicarb 18, elevated lactate up to 3.5. Nephrology consulted for JAE with c/o HRS    Interval History: Juan Carlos laying in bed this morning. Tolerated SCUF last night, however d/t drips still overall net positive.     Review of patient's allergies indicates:  No Known Allergies  Current Facility-Administered Medications   Medication Frequency    0.9%  NaCl infusion (CRRT USE ONLY) Continuous    0.9%  NaCl infusion (CRRT USE ONLY) Continuous    albuterol-ipratropium 2.5 mg-0.5 mg/3 mL nebulizer solution 3 mL Q6H PRN    aluminum-magnesium hydroxide-simethicone 200-200-20 mg/5 mL suspension 30 mL QID PRN    dextrose 10% bolus 125 mL 125 mL PRN    dextrose 10% bolus 250 mL 250 mL PRN    glucagon (human recombinant) injection 1 mg PRN    glucose chewable tablet 16 g PRN    glucose chewable tablet 24 g PRN    hydrocortisone tablet 15 mg Before breakfast     And    hydrocortisone tablet 10 mg Daily    lactulose 20 gram/30 mL solution Soln 30 g TID    magnesium sulfate 2g in water 50mL IVPB (premix) PRN    melatonin tablet 6 mg Nightly PRN    meropenem injection 1 g Q12H    metoprolol tartrate (LOPRESSOR) split tablet 12.5 mg BID    midodrine tablet 20 mg Q8H    naloxone 0.4 mg/mL injection 0.02 mg PRN    NORepinephrine 4 mg in sodium chloride 0.9% 250 mL infusion (premix) Continuous    ondansetron injection 4 mg Q8H PRN    pantoprazole EC tablet 40 mg Daily    simethicone chewable tablet 80 mg QID PRN    sodium chloride 0.9% flush 10 mL Q12H PRN    sodium chloride 0.9% flush 10 mL PRN    tamsulosin 24 hr capsule 0.4 mg QHS       Objective:     Vital Signs (Most Recent):  Temp: 97.5 °F (36.4 °C) (12/08/24 1500)  Pulse: 77 (12/08/24 1500)  Resp: 13 (12/08/24 1500)  BP: (!) 109/42 (12/08/24 0935)  SpO2: 97 % (12/08/24 1500) Vital Signs (24h Range):  Temp:  [96.8 °F (36 °C)-99.1 °F (37.3 °C)] 97.5 °F (36.4 °C)  Pulse:  [75-90] 77  Resp:  [12-19] 13  SpO2:  [95 %-98 %] 97 %  BP: (109-122)/(41-42) 109/42  Arterial Line BP: ()/(35-50) 112/39     Weight: (!) 141 kg (310 lb 13.6 oz) (12/07/24 0501)  Body mass index is 41.01 kg/m².  Body surface area is 2.69 meters squared.    I/O last 3 completed shifts:  In: 7015.2 [P.O.:1200; I.V.:5567.3; IV Piggyback:247.9]  Out: 6413 [Urine:125; Other:6288]     Physical Exam  Constitutional:       General: He is not in acute distress.     Appearance: He is ill-appearing. He is not toxic-appearing.   HENT:      Head: Normocephalic and atraumatic.   Eyes:      Extraocular Movements: Extraocular movements intact.      Pupils: Pupils are equal, round, and reactive to light.   Cardiovascular:      Rate and Rhythm: Normal rate.   Pulmonary:      Effort: No respiratory distress.      Breath sounds: No wheezing or rales.   Abdominal:      General: There is distension.      Tenderness: There is no abdominal tenderness. There is no guarding.    Musculoskeletal:         General: Swelling present.      Right lower leg: Edema present.      Left lower leg: Edema present.   Skin:     Coloration: Skin is pale. Skin is not jaundiced.   Neurological:      Mental Status: He is oriented to person, place, and time.   Psychiatric:         Behavior: Behavior normal.          Significant Labs:  CBC:   Recent Labs   Lab 12/08/24 0307   WBC 7.58   RBC 2.87*   HGB 7.2*   HCT 22.7*   PLT 32*   MCV 79*   MCH 25.1*   MCHC 31.7*     CMP:   Recent Labs   Lab 12/08/24 0307 12/08/24  1320   *  117*  118* 122*   CALCIUM 8.3*  8.3*  8.3* 8.4*   ALBUMIN 2.3*  2.3*  2.3* 2.3*   PROT 5.4*  --    *  132*  132* 132*   K 3.9  3.9  3.9 3.7   CO2 19*  19*  19* 15*     106  106 107   BUN 6*  6*  6* 7*   CREATININE 2.1*  2.1*  2.1* 2.5*   ALKPHOS 60  --    ALT 17  --    AST 34  --    BILITOT 2.7*  --      LFTs:   Recent Labs   Lab 12/08/24 0307 12/08/24  1320   ALT 17  --    AST 34  --    ALKPHOS 60  --    BILITOT 2.7*  --    PROT 5.4*  --    ALBUMIN 2.3*  2.3*  2.3* 2.3*         Assessment/Plan:   Renal/  JAE (acute kidney injury)  JAE is likely due to pre-renal azotemia due to intravascular volume depletion secondary to decompensated hepatic cirrhosis with volume overload and acute tubular necrosis caused by hemodynamic instability, renal hypoperfusion, severe sepsis with GNR bacteremia. Initial presentation concerning for hepatorenal syndrome, transferred to MICU for vasopressors without success in reaching MAP goal 85-90 for treatment of HRS. Currently, patient with oligouric JAE more contributed by ATN as above.      -12 hrs SCUF last night. Still net positive. Will start continuous SCUF for volume removal. 350-400 cc/hr. Levophed as needed to maintain SBP>65.   -Avoid nephrotoxins and renally dose meds for GFR listed above  -Monitor urine output, serial BMP, and adjust therapy as needed    Thank you for your consult. I will follow-up with  patient. Please contact us if you have any additional questions.    Renard Melton MD  Nephrology  [facility] - Medical ICU

## 2024-12-08 NOTE — PROGRESS NOTES
12/08/24 0552   Treatment   Treatment Type SCUF   Treatment Status Blood returned   Dialyzer Time (hours) 12.09   BVP (Liters) 0 L   CRRT Comments Tx completed; blood returned

## 2024-12-09 PROBLEM — E27.40 ADRENAL INSUFFICIENCY: Status: ACTIVE | Noted: 2024-12-05

## 2024-12-09 LAB
ACANTHOCYTES BLD QL SMEAR: PRESENT
ALBUMIN SERPL BCP-MCNC: 2.3 G/DL (ref 3.5–5.2)
ALBUMIN SERPL BCP-MCNC: 2.4 G/DL (ref 3.5–5.2)
ALP SERPL-CCNC: 70 U/L (ref 40–150)
ALT SERPL W/O P-5'-P-CCNC: 17 U/L (ref 10–44)
ANION GAP SERPL CALC-SCNC: 10 MMOL/L (ref 8–16)
ANION GAP SERPL CALC-SCNC: 8 MMOL/L (ref 8–16)
ANION GAP SERPL CALC-SCNC: 8 MMOL/L (ref 8–16)
ANION GAP SERPL CALC-SCNC: 9 MMOL/L (ref 8–16)
ANISOCYTOSIS BLD QL SMEAR: ABNORMAL
AST SERPL-CCNC: 44 U/L (ref 10–40)
BACTERIA STL CULT: NORMAL
BASOPHILS # BLD AUTO: 0.03 K/UL (ref 0–0.2)
BASOPHILS NFR BLD: 0.4 % (ref 0–1.9)
BILIRUB SERPL-MCNC: 2.6 MG/DL (ref 0.1–1)
BUN SERPL-MCNC: 8 MG/DL (ref 8–23)
BUN SERPL-MCNC: 9 MG/DL (ref 8–23)
BUN SERPL-MCNC: 9 MG/DL (ref 8–23)
BURR CELLS BLD QL SMEAR: ABNORMAL
CALCIUM SERPL-MCNC: 8.3 MG/DL (ref 8.7–10.5)
CALCIUM SERPL-MCNC: 8.4 MG/DL (ref 8.7–10.5)
CALCIUM SERPL-MCNC: 8.5 MG/DL (ref 8.7–10.5)
CHLORIDE SERPL-SCNC: 107 MMOL/L (ref 95–110)
CHLORIDE SERPL-SCNC: 109 MMOL/L (ref 95–110)
CHLORIDE SERPL-SCNC: 109 MMOL/L (ref 95–110)
CHLORIDE SERPL-SCNC: 110 MMOL/L (ref 95–110)
CHLORIDE SERPL-SCNC: 111 MMOL/L (ref 95–110)
CO2 SERPL-SCNC: 13 MMOL/L (ref 23–29)
CO2 SERPL-SCNC: 14 MMOL/L (ref 23–29)
CO2 SERPL-SCNC: 16 MMOL/L (ref 23–29)
CREAT SERPL-MCNC: 2.5 MG/DL (ref 0.5–1.4)
CREAT SERPL-MCNC: 2.7 MG/DL (ref 0.5–1.4)
CREAT SERPL-MCNC: 2.8 MG/DL (ref 0.5–1.4)
DACRYOCYTES BLD QL SMEAR: ABNORMAL
DIFFERENTIAL METHOD BLD: ABNORMAL
DOHLE BOD BLD QL SMEAR: PRESENT
EOSINOPHIL # BLD AUTO: 0.1 K/UL (ref 0–0.5)
EOSINOPHIL NFR BLD: 1.5 % (ref 0–8)
ERYTHROCYTE [DISTWIDTH] IN BLOOD BY AUTOMATED COUNT: 25.2 % (ref 11.5–14.5)
EST. GFR  (NO RACE VARIABLE): 23.4 ML/MIN/1.73 M^2
EST. GFR  (NO RACE VARIABLE): 24.4 ML/MIN/1.73 M^2
EST. GFR  (NO RACE VARIABLE): 26.8 ML/MIN/1.73 M^2
GIANT PLATELETS BLD QL SMEAR: PRESENT
GLUCOSE SERPL-MCNC: 101 MG/DL (ref 70–110)
GLUCOSE SERPL-MCNC: 102 MG/DL (ref 70–110)
GLUCOSE SERPL-MCNC: 102 MG/DL (ref 70–110)
GLUCOSE SERPL-MCNC: 109 MG/DL (ref 70–110)
GLUCOSE SERPL-MCNC: 94 MG/DL (ref 70–110)
HCT VFR BLD AUTO: 22.8 % (ref 40–54)
HGB BLD-MCNC: 7.2 G/DL (ref 14–18)
HYPOCHROMIA BLD QL SMEAR: ABNORMAL
IMM GRANULOCYTES # BLD AUTO: 0.04 K/UL (ref 0–0.04)
IMM GRANULOCYTES NFR BLD AUTO: 0.5 % (ref 0–0.5)
LYMPHOCYTES # BLD AUTO: 0.8 K/UL (ref 1–4.8)
LYMPHOCYTES NFR BLD: 9.5 % (ref 18–48)
MAGNESIUM SERPL-MCNC: 2.1 MG/DL (ref 1.6–2.6)
MAGNESIUM SERPL-MCNC: 2.1 MG/DL (ref 1.6–2.6)
MAGNESIUM SERPL-MCNC: 2.3 MG/DL (ref 1.6–2.6)
MCH RBC QN AUTO: 24.9 PG (ref 27–31)
MCHC RBC AUTO-ENTMCNC: 31.6 G/DL (ref 32–36)
MCV RBC AUTO: 79 FL (ref 82–98)
MONOCYTES # BLD AUTO: 1 K/UL (ref 0.3–1)
MONOCYTES NFR BLD: 11.8 % (ref 4–15)
NEUTROPHILS # BLD AUTO: 6.4 K/UL (ref 1.8–7.7)
NEUTROPHILS NFR BLD: 76.3 % (ref 38–73)
NRBC BLD-RTO: 0 /100 WBC
OHS QRS DURATION: 94 MS
OHS QRS DURATION: 96 MS
OHS QTC CALCULATION: 446 MS
OHS QTC CALCULATION: 480 MS
PHOSPHATE SERPL-MCNC: 4.1 MG/DL (ref 2.7–4.5)
PHOSPHATE SERPL-MCNC: 4.1 MG/DL (ref 2.7–4.5)
PHOSPHATE SERPL-MCNC: 4.2 MG/DL (ref 2.7–4.5)
PHOSPHATE SERPL-MCNC: 4.4 MG/DL (ref 2.7–4.5)
PLATELET # BLD AUTO: 37 K/UL (ref 150–450)
PLATELET BLD QL SMEAR: ABNORMAL
PMV BLD AUTO: ABNORMAL FL (ref 9.2–12.9)
POIKILOCYTOSIS BLD QL SMEAR: ABNORMAL
POLYCHROMASIA BLD QL SMEAR: ABNORMAL
POTASSIUM SERPL-SCNC: 3.7 MMOL/L (ref 3.5–5.1)
POTASSIUM SERPL-SCNC: 3.8 MMOL/L (ref 3.5–5.1)
POTASSIUM SERPL-SCNC: 3.8 MMOL/L (ref 3.5–5.1)
PROT SERPL-MCNC: 5.6 G/DL (ref 6–8.4)
RBC # BLD AUTO: 2.89 M/UL (ref 4.6–6.2)
SCHISTOCYTES BLD QL SMEAR: ABNORMAL
SCHISTOCYTES BLD QL SMEAR: PRESENT
SODIUM SERPL-SCNC: 132 MMOL/L (ref 136–145)
SODIUM SERPL-SCNC: 133 MMOL/L (ref 136–145)
SODIUM SERPL-SCNC: 134 MMOL/L (ref 136–145)
TARGETS BLD QL SMEAR: ABNORMAL
TOXIC GRANULES BLD QL SMEAR: PRESENT
WBC # BLD AUTO: 8.39 K/UL (ref 3.9–12.7)

## 2024-12-09 PROCEDURE — 25000003 PHARM REV CODE 250: Performed by: INTERNAL MEDICINE

## 2024-12-09 PROCEDURE — 80069 RENAL FUNCTION PANEL: CPT | Mod: 91 | Performed by: STUDENT IN AN ORGANIZED HEALTH CARE EDUCATION/TRAINING PROGRAM

## 2024-12-09 PROCEDURE — 90945 DIALYSIS ONE EVALUATION: CPT

## 2024-12-09 PROCEDURE — 99291 CRITICAL CARE FIRST HOUR: CPT | Mod: ,,, | Performed by: INTERNAL MEDICINE

## 2024-12-09 PROCEDURE — 84100 ASSAY OF PHOSPHORUS: CPT

## 2024-12-09 PROCEDURE — 80069 RENAL FUNCTION PANEL: CPT | Performed by: STUDENT IN AN ORGANIZED HEALTH CARE EDUCATION/TRAINING PROGRAM

## 2024-12-09 PROCEDURE — 97530 THERAPEUTIC ACTIVITIES: CPT

## 2024-12-09 PROCEDURE — 80100008 HC CRRT DAILY MAINTENANCE

## 2024-12-09 PROCEDURE — 80053 COMPREHEN METABOLIC PANEL: CPT

## 2024-12-09 PROCEDURE — 90945 DIALYSIS ONE EVALUATION: CPT | Mod: ,,, | Performed by: INTERNAL MEDICINE

## 2024-12-09 PROCEDURE — 25000003 PHARM REV CODE 250: Performed by: STUDENT IN AN ORGANIZED HEALTH CARE EDUCATION/TRAINING PROGRAM

## 2024-12-09 PROCEDURE — 94761 N-INVAS EAR/PLS OXIMETRY MLT: CPT

## 2024-12-09 PROCEDURE — 83735 ASSAY OF MAGNESIUM: CPT | Performed by: STUDENT IN AN ORGANIZED HEALTH CARE EDUCATION/TRAINING PROGRAM

## 2024-12-09 PROCEDURE — 25000003 PHARM REV CODE 250

## 2024-12-09 PROCEDURE — 85025 COMPLETE CBC W/AUTO DIFF WBC: CPT

## 2024-12-09 PROCEDURE — 97535 SELF CARE MNGMENT TRAINING: CPT

## 2024-12-09 PROCEDURE — 20000000 HC ICU ROOM

## 2024-12-09 PROCEDURE — 83735 ASSAY OF MAGNESIUM: CPT | Mod: 91 | Performed by: STUDENT IN AN ORGANIZED HEALTH CARE EDUCATION/TRAINING PROGRAM

## 2024-12-09 RX ADMIN — TAMSULOSIN HYDROCHLORIDE 0.4 MG: 0.4 CAPSULE ORAL at 08:12

## 2024-12-09 RX ADMIN — MIDODRINE HYDROCHLORIDE 20 MG: 5 TABLET ORAL at 02:12

## 2024-12-09 RX ADMIN — METOPROLOL TARTRATE 12.5 MG: 25 TABLET, FILM COATED ORAL at 08:12

## 2024-12-09 RX ADMIN — SODIUM CHLORIDE: 9 INJECTION, SOLUTION INTRAVENOUS at 04:12

## 2024-12-09 RX ADMIN — MIDODRINE HYDROCHLORIDE 20 MG: 5 TABLET ORAL at 05:12

## 2024-12-09 RX ADMIN — PANTOPRAZOLE SODIUM 40 MG: 40 TABLET, DELAYED RELEASE ORAL at 08:12

## 2024-12-09 RX ADMIN — LACTULOSE 30 G: 20 SOLUTION ORAL at 08:12

## 2024-12-09 RX ADMIN — HYDROCORTISONE 10 MG: 5 TABLET ORAL at 02:12

## 2024-12-09 RX ADMIN — LACTULOSE 30 G: 20 SOLUTION ORAL at 02:12

## 2024-12-09 RX ADMIN — HYDROCORTISONE 15 MG: 5 TABLET ORAL at 05:12

## 2024-12-09 RX ADMIN — SODIUM CHLORIDE: 9 INJECTION, SOLUTION INTRAVENOUS at 11:12

## 2024-12-09 RX ADMIN — MIDODRINE HYDROCHLORIDE 20 MG: 5 TABLET ORAL at 09:12

## 2024-12-09 NOTE — PT/OT/SLP PROGRESS
Physical Therapy  Co-Treatment with OT    Patient Name:  Juan Carlos Yoo Sr.   MRN:  [id number]    Recommendations:     Discharge Recommendations: Moderate Intensity Therapy  Discharge Equipment Recommendations: wheelchair, walker, rolling, bedside commode, bath bench, hospital bed, grab bar  Barriers to discharge: Decreased caregiver support    Assessment:     Juan Carlos Yoo Sr. is a 69 y.o. male admitted with a medical diagnosis of Decompensated cirrhosis.  He presents with the following impairments/functional limitations: impaired endurance, impaired functional mobility, gait instability, impaired balance, decreased safety awareness, decreased lower extremity function pt tolerated treatment well and will benefit from cont skilled PT 4x/wk to progress physically.  Pt is significantly below previous functional level, increased risk of falls and increased burden of care currently. Patient continues to demonstrate the need for moderate intensity therapy on a daily basis post acute exhibited by decreased independence with functional mobility    Rehab Prognosis: Good; patient would benefit from acute skilled PT services to address these deficits and reach maximum level of function.    Recent Surgery: * No surgery found *      Plan:     During this hospitalization, patient to be seen 4 x/week to address the identified rehab impairments via gait training, therapeutic activities, neuromuscular re-education and progress toward the following goals:    Plan of Care Expires:  12/27/24    Subjective     Chief Complaint: pt stated that he had had a bowel movement.   Patient/Family Comments/goals: to get better and go home.   Pain/Comfort:  Pain Rating 1: 0/10  Pain Rating Post-Intervention 1: 0/10      Objective:     Communicated with nurse  prior to session.  Patient found supine with telemetry, arterial line, pulse ox (continuous), blood pressure cuff, PureWick (R IJ dialysis catheter) upon PT entry to room.     General Precautions:  Standard, fall  Orthopedic Precautions: N/A  Braces: N/A  Respiratory Status: Room air     Functional Mobility:  Bed Mobility:  pt needed verbal cues for hand placement and sequencing for functional mobility.   Rolling Left:  maximal assistance  Rolling Right: maximal assistance x 2 reps   Supine to Sit: maximal assistance and of 2 persons  Sit to Supine: maximal assistance and of 2 persons  Scooting: pt was max assist for side scooting to head of bed.    Transfers:     Sit to Stand:  minimum assistance with hand-held assist    Gait: pt was unable to take steps.     Balance: pt sat on EOB x 10 min with SBA and performed ADLS with OT.  Pt was min assist static standing balance.     Due to pt complex medical condition, the skill of 2 licensed therapists is needed to maximize treatment session and progression towards goals  Pt white board updated with current therapists name and level of mobility assistance needed.         AM-PAC 6 CLICK MOBILITY  Turning over in bed (including adjusting bedclothes, sheets and blankets)?: 2  Sitting down on and standing up from a chair with arms (e.g., wheelchair, bedside commode, etc.): 3  Moving from lying on back to sitting on the side of the bed?: 2  Moving to and from a bed to a chair (including a wheelchair)?: 2  Need to walk in hospital room?: 1  Climbing 3-5 steps with a railing?: 1  Basic Mobility Total Score: 11       Treatment & Education:  Pt received verbal instructions in PT POC and verbally expressed understanding of such.     Patient left supine with all lines intact, call button in reach, and RN  present..    GOALS:   Multidisciplinary Problems       Physical Therapy Goals          Problem: Physical Therapy    Goal Priority Disciplines Outcome Interventions   Physical Therapy Goal     PT, PT/OT Progressing    Description: Goals to be met by: 24     Patient will increase functional independence with mobility by performin. Supine to sit with Minimal  Assistance  2. Sit to stand transfer with Stand By Assistance  3. Bed to chair transfer with Stand By Assistance using LRAD  4. Gait  x 150 feet with Stand By Assistance using No LRAD.   5. Pt sit on EOB x 15 min with SBA and perform functional activities to increased functional mobility.                          Time Tracking:     PT Received On: 12/09/24  PT Start Time: 1105     PT Stop Time: 1138  PT Total Time (min): 33 min     Billable Minutes: Therapeutic Activity 33 min        PT/PTA: PT     Number of PTA visits since last PT visit: 0     12/09/2024

## 2024-12-09 NOTE — SUBJECTIVE & OBJECTIVE
Interval History:     NAEON, On continuous scuf,, ultrafiltration 350-400 mL/hr, on Levophed, urine output 75 mL, input/output -976.1, blood pressure 109/42, serum creatinine 2.5, potassium 3.7, sodium 133    Review of patient's allergies indicates:  No Known Allergies  Current Facility-Administered Medications   Medication Frequency    albuterol-ipratropium 2.5 mg-0.5 mg/3 mL nebulizer solution 3 mL Q6H PRN    aluminum-magnesium hydroxide-simethicone 200-200-20 mg/5 mL suspension 30 mL QID PRN    dextrose 10% bolus 125 mL 125 mL PRN    dextrose 10% bolus 250 mL 250 mL PRN    glucagon (human recombinant) injection 1 mg PRN    glucose chewable tablet 16 g PRN    glucose chewable tablet 24 g PRN    hydrocortisone tablet 15 mg Before breakfast    And    hydrocortisone tablet 10 mg Daily    lactulose 20 gram/30 mL solution Soln 30 g TID    melatonin tablet 6 mg Nightly PRN    metoprolol tartrate (LOPRESSOR) split tablet 12.5 mg BID    midodrine tablet 20 mg Q8H    naloxone 0.4 mg/mL injection 0.02 mg PRN    ondansetron injection 4 mg Q8H PRN    pantoprazole EC tablet 40 mg Daily    simethicone chewable tablet 80 mg QID PRN    sodium chloride 0.9% flush 10 mL PRN    tamsulosin 24 hr capsule 0.4 mg QHS       Objective:     Vital Signs (Most Recent):  Temp: 96.8 °F (36 °C) (12/09/24 1501)  Pulse: 80 (12/09/24 1501)  Resp: 13 (12/09/24 1501)  BP: (!) 109/42 (12/08/24 0935)  SpO2: 95 % (12/09/24 1501) Vital Signs (24h Range):  Temp:  [96.1 °F (35.6 °C)-97.1 °F (36.2 °C)] 96.8 °F (36 °C)  Pulse:  [77-92] 80  Resp:  [11-25] 13  SpO2:  [93 %-100 %] 95 %  Arterial Line BP: ()/(37-45) 87/40     Weight: (!) 141 kg (310 lb 13.6 oz) (12/07/24 0501)  Body mass index is 41.01 kg/m².  Body surface area is 2.69 meters squared.    I/O last 3 completed shifts:  In: 6753.5 [P.O.:600; I.V.:6153.5]  Out: 8506 [Urine:125; Other:8381]     Physical Exam  Constitutional:       General: He is not in acute distress.     Appearance: He is  ill-appearing. He is not toxic-appearing.   HENT:      Head: Normocephalic and atraumatic.   Eyes:      Extraocular Movements: Extraocular movements intact.      Pupils: Pupils are equal, round, and reactive to light.   Pulmonary:      Effort: No respiratory distress.   Abdominal:      General: There is distension.      Tenderness: There is no abdominal tenderness. There is no guarding.   Musculoskeletal:      Right lower leg: Edema present.      Left lower leg: Edema present.   Skin:     Coloration: Skin is pale. Skin is not jaundiced.   Neurological:      Mental Status: He is oriented to person, place, and time.   Psychiatric:         Behavior: Behavior normal.          Significant Labs:  CBC:   Recent Labs   Lab 12/09/24 0246   WBC 8.39   RBC 2.89*   HGB 7.2*   HCT 22.8*   PLT 37*   MCV 79*   MCH 24.9*   MCHC 31.6*     CMP:   Recent Labs   Lab 12/09/24 0246 12/09/24  1507     101  101  101  101  102 109   CALCIUM 8.3*  8.4*  8.4*  8.4*  8.4*  8.3* 8.5*   ALBUMIN 2.3*  2.3*  2.3*  2.3*  2.3*  2.3* 2.4*   PROT 5.6*  --    *  132*  132*  132*  132*  134* 133*   K 3.8  3.7  3.7  3.7  3.7  3.8 3.7   CO2 16*  16*  16*  16*  16*  16* 13*     107  107  107  107  109 110   BUN 8  8  8  8  8  8 9   CREATININE 2.5*  2.5*  2.5*  2.5*  2.5*  2.5* 2.7*   ALKPHOS 70  --    ALT 17  --    AST 44*  --    BILITOT 2.6*  --      LFTs:   Recent Labs   Lab 12/09/24  0246 12/09/24  1507   ALT 17  --    AST 44*  --    ALKPHOS 70  --    BILITOT 2.6*  --    PROT 5.6*  --    ALBUMIN 2.3*  2.3*  2.3*  2.3*  2.3*  2.3* 2.4*

## 2024-12-09 NOTE — ASSESSMENT & PLAN NOTE
JAE is likely due to pre-renal azotemia due to intravascular volume depletion secondary to decompensated hepatic cirrhosis with volume overload and acute tubular necrosis caused by hemodynamic instability, renal hypoperfusion, severe sepsis with GNR bacteremia. Initial presentation concerning for hepatorenal syndrome, transferred to MICU for vasopressors without success in reaching MAP goal 85-90 for treatment of HRS. Currently, patient with oligouric JAE more contributed by ATN as above.     Recommendations:    -Plan for scuf with ultrafiltration 350-400 mL/hr tonight for removal of uremic toxins and volume control from  -Still of pressors.  Map ranges from (low 50s to mid 60s) while on pressors  -  Recommend goals of care discussion regarding current prognosis and liver transplant candidacy   -Avoid nephrotoxins and renally dose meds for GFR listed above  -Monitor urine output, serial BMP, and adjust therapy as needed  -Hemodialysis consent obtained & placed in patient chart

## 2024-12-09 NOTE — ASSESSMENT & PLAN NOTE
Patient developed abdominal pain 2/2 constipation last week and NGT was placed. 11/30 NGT was removed. The day after the NGT was removed he developed constipation and dilated bowel loops on AXR.  Some BM after brown bomb enema.   CTM  Lactulose TID  Last BM 12/8

## 2024-12-09 NOTE — PROGRESS NOTES
[facility] - Medical ICU  Nephrology  Progress Note    Patient Name: Juan Carlos Yoo Sr.  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 Recent admission 10/4 at [facility] for decompensated hepatic cirrhosis where he was discharged with oral diuretic regimen including furosemide 60 mg BID and metolazone 2.5 mg daily, low salt diet with fluid restriction. It is unclear if patient is adherent to these medications or lifestyle modifications. W/u notable for anemia with hb 6.7 s/p 1 unit pRBC 11/20 and JAE on CKD w elevated BUN 49/creatinine 5.9, hyperkalemia (K 6.1>5.1 after shifting), bicarb 18, elevated lactate up to 3.5. Nephrology consulted for JAE with c/o HRS    Interval History:     NAEON, On continuous scuf,, ultrafiltration 350-400 mL/hr, on Levophed, urine output 75 mL, input/output -976.1, blood pressure 109/42, serum creatinine 2.5, potassium 3.7, sodium 133    Review of patient's allergies indicates:  No Known Allergies  Current Facility-Administered Medications   Medication Frequency    albuterol-ipratropium 2.5 mg-0.5 mg/3 mL nebulizer solution 3 mL Q6H PRN    aluminum-magnesium hydroxide-simethicone 200-200-20 mg/5 mL suspension 30 mL QID PRN    dextrose 10% bolus 125 mL 125 mL PRN    dextrose 10% bolus 250 mL 250 mL PRN    glucagon (human recombinant) injection 1 mg PRN    glucose chewable tablet 16 g PRN    glucose chewable tablet 24 g PRN    hydrocortisone tablet 15 mg Before breakfast    And    hydrocortisone  tablet 10 mg Daily    lactulose 20 gram/30 mL solution Soln 30 g TID    melatonin tablet 6 mg Nightly PRN    metoprolol tartrate (LOPRESSOR) split tablet 12.5 mg BID    midodrine tablet 20 mg Q8H    naloxone 0.4 mg/mL injection 0.02 mg PRN    ondansetron injection 4 mg Q8H PRN    pantoprazole EC tablet 40 mg Daily    simethicone chewable tablet 80 mg QID PRN    sodium chloride 0.9% flush 10 mL PRN    tamsulosin 24 hr capsule 0.4 mg QHS       Objective:     Vital Signs (Most Recent):  Temp: 96.8 °F (36 °C) (12/09/24 1501)  Pulse: 80 (12/09/24 1501)  Resp: 13 (12/09/24 1501)  BP: (!) 109/42 (12/08/24 0935)  SpO2: 95 % (12/09/24 1501) Vital Signs (24h Range):  Temp:  [96.1 °F (35.6 °C)-97.1 °F (36.2 °C)] 96.8 °F (36 °C)  Pulse:  [77-92] 80  Resp:  [11-25] 13  SpO2:  [93 %-100 %] 95 %  Arterial Line BP: ()/(37-45) 87/40     Weight: (!) 141 kg (310 lb 13.6 oz) (12/07/24 0501)  Body mass index is 41.01 kg/m².  Body surface area is 2.69 meters squared.    I/O last 3 completed shifts:  In: 6753.5 [P.O.:600; I.V.:6153.5]  Out: 8506 [Urine:125; Other:8381]     Physical Exam  Constitutional:       General: He is not in acute distress.     Appearance: He is ill-appearing. He is not toxic-appearing.   HENT:      Head: Normocephalic and atraumatic.   Eyes:      Extraocular Movements: Extraocular movements intact.      Pupils: Pupils are equal, round, and reactive to light.   Pulmonary:      Effort: No respiratory distress.   Abdominal:      General: There is distension.      Tenderness: There is no abdominal tenderness. There is no guarding.   Musculoskeletal:      Right lower leg: Edema present.      Left lower leg: Edema present.   Skin:     Coloration: Skin is pale. Skin is not jaundiced.   Neurological:      Mental Status: He is oriented to person, place, and time.   Psychiatric:         Behavior: Behavior normal.          Significant Labs:  CBC:   Recent Labs   Lab 12/09/24  0246   WBC 8.39   RBC 2.89*   HGB 7.2*   HCT  22.8*   PLT 37*   MCV 79*   MCH 24.9*   MCHC 31.6*     CMP:   Recent Labs   Lab 12/09/24  0246 12/09/24  1507     101  101  101  101  102 109   CALCIUM 8.3*  8.4*  8.4*  8.4*  8.4*  8.3* 8.5*   ALBUMIN 2.3*  2.3*  2.3*  2.3*  2.3*  2.3* 2.4*   PROT 5.6*  --    *  132*  132*  132*  132*  134* 133*   K 3.8  3.7  3.7  3.7  3.7  3.8 3.7   CO2 16*  16*  16*  16*  16*  16* 13*     107  107  107  107  109 110   BUN 8  8  8  8  8  8 9   CREATININE 2.5*  2.5*  2.5*  2.5*  2.5*  2.5* 2.7*   ALKPHOS 70  --    ALT 17  --    AST 44*  --    BILITOT 2.6*  --      LFTs:   Recent Labs   Lab 12/09/24  0246 12/09/24  1507   ALT 17  --    AST 44*  --    ALKPHOS 70  --    BILITOT 2.6*  --    PROT 5.6*  --    ALBUMIN 2.3*  2.3*  2.3*  2.3*  2.3*  2.3* 2.4*       Assessment/Plan:     Renal/  JAE (acute kidney injury)  JAE is likely due to pre-renal azotemia due to intravascular volume depletion secondary to decompensated hepatic cirrhosis with volume overload and acute tubular necrosis caused by hemodynamic instability, renal hypoperfusion, severe sepsis with GNR bacteremia. Initial presentation concerning for hepatorenal syndrome, transferred to MICU for vasopressors without success in reaching MAP goal 85-90 for treatment of HRS. Currently, patient with oligouric JAE more contributed by ATN as above.     Recommendations:    -Plan for scuf with ultrafiltration 350-400 mL/hr tonight for removal of uremic toxins and volume control from  -Still of pressors.  Map ranges from (low 50s to mid 60s) while on pressors  -  Recommend goals of care discussion regarding current prognosis and liver transplant candidacy   -Avoid nephrotoxins and renally dose meds for GFR listed above  -Monitor urine output, serial BMP, and adjust therapy as needed  -Hemodialysis consent obtained & placed in patient chart        Thank you for your consult. I will follow-up with patient. Please  contact us if you have any additional questions.    Irma Hernandez MD  Nephrology  [facility] - Medical ICU    ATTENDING PHYSICIAN ATTESTATION  I have personally verified the history and examined the patient. I thoroughly reviewed the demographic, clinical, laboratorial and imaging information available in medical records. I agree with the assessment and recommendations provided by the subspecialty resident who was under my supervision.     DIALYSIS (CRRT) NOTE  Patient evaluated while undergoing Slow Continuous Ultrafiltration (SCUF) indicated for JAE volume overload and hemodynamic instability. No complications, tolerating session with current UFR. Will continue current support.

## 2024-12-09 NOTE — PT/OT/SLP PROGRESS
Occupational Therapy   Treatment    Name: Juan Carlos Yoo Sr.  MRN: [id number]  Admitting Diagnosis:  Decompensated cirrhosis       Recommendations:     Discharge Recommendations: Moderate Intensity Therapy  Discharge Equipment Recommendations:  wheelchair, walker, rolling, bedside commode, bath bench, hospital bed, grab bar  Barriers to discharge:  Decreased caregiver support    Assessment:     Juan Carlos Yoo Sr. is a 69 y.o. male with a medical diagnosis of Decompensated cirrhosis. Performance deficits affecting function are weakness, impaired endurance, impaired self care skills, impaired functional mobility, gait instability, impaired balance, decreased safety awareness, decreased lower extremity function. Patient cleared for therapy and agreed to EOB activity. All VSS throughout session. Patient would benefit from continued skilled acute OT 4x/wk to improve functional mobility, increase independence with ADLs, and address established goals. Recommending moderate intensity therapy once medically appropriate for discharge to increase maximal independence, reduce burden of care, and ensure safety.     Rehab Prognosis:  Good; patient would benefit from acute skilled OT services to address these deficits and reach maximum level of function.       Plan:     Patient to be seen 4 x/week to address the above listed problems via self-care/home management, therapeutic activities, therapeutic exercises  Plan of Care Expires: 12/27/24  Plan of Care Reviewed with: patient    Subjective     Chief Complaint: Needing to have a bowel movement.   Patient/Family Comments/goals: patient agreed to therapy  Pain/Comfort:  Pain Rating 1: 0/10  Pain Rating Post-Intervention 1: 0/10    Objective:     Communicated with: NSG prior to session.  Patient found HOB elevated with telemetry, arterial line, pulse ox (continuous), blood pressure cuff, PureWick (R IJ dialysis catheter) upon OT entry to room.    General Precautions: Standard, fall     Orthopedic Precautions:N/A  Braces: N/A  Respiratory Status: Room air     Occupational Performance:     Bed Mobility:    Patient completed Rolling/Turning to Left with  maximal assistance  Patient completed Rolling/Turning to Right with maximal assistance  Patient completed Scooting/Bridging with maximal assistance for scooting to HOB sitting EOB; total assistance of 2 persons to HOB lying supine  Patient completed Supine to Sit with maximal assistance and 2 persons with HOB elevated  Patient completed Sit to Supine with maximal assistance and 2 persons with HOB flat    Functional Mobility/Transfers:  Patient completed Sit <> Stand Transfer with minimum assistance  with  hand-held assist     Activities of Daily Living:  Grooming: stand by assistance oral care, washing face, and combing hair sitting EOB  Lower Body Dressing: total assistance [place] and donning socks   Toileting: total assistance Patient had a BM and needed to be cleaned (bed linens needed to be changed also)       [facility] 6 Click ADL: 13    Treatment & Education:   Role of OT and POC  ADL retraining  Functional mobility training  Safety  Importance EOB/OOB activity    SBA sitting EOB throughout    Co-treatment performed due to patient's multiple deficits requiring two skilled therapists to appropriately and safely assess patient's strength and endurance while facilitating functional tasks in addition to accommodating for patient's activity tolerance.     Patient left HOB elevated with all lines intact, call button in reach, nurse present, and all needs met.     GOALS:   Multidisciplinary Problems       Occupational Therapy Goals          Problem: Occupational Therapy    Goal Priority Disciplines Outcome Interventions   Occupational Therapy Goal     OT, PT/OT Progressing    Description: Goals to be met by: 12/25/24     Patient will increase functional independence with ADLs by performing:    UE Dressing with Set-up Assistance.  LE Dressing with  Stand-by Assistance.  Grooming while standing at sink with Stand-by Assistance.  Toileting from toilet with Stand-by Assistance for hygiene and clothing management.   Step transfer: with SBA and use of LRAD  Supine to sit with SBA  Toilet transfer to toilet with SBA and use of LRAD.  Independent with HEP to BUE to improve ROM                         Time Tracking:     OT Date of Treatment: 12/09/24  OT Start Time: 1105  OT Stop Time: 1138  OT Total Time (min): 33 min    Billable Minutes:Self Care/Home Management 33               12/9/2024

## 2024-12-09 NOTE — ASSESSMENT & PLAN NOTE
Baseline creatinine is 1.5. May require dialysis long term.   Nephro recs 12/8:  12 hrs SCUF last night. Still net positive. Will start continuous SCUF for volume removal. 350-400 cc/hr. Levophed as needed to maintain SBP>65.   Avoid nephrotoxins and renally dose meds for GFR listed above  Monitor urine output, serial BMP, and adjust therapy as needed

## 2024-12-09 NOTE — ASSESSMENT & PLAN NOTE
Blood cultures from admission with B. Diminuta, micrococcus luteus, lysinobacillus species. Seen by ID previously who recommended stopping antibiotics due to concern these were contaminants. Repeat blood cultures have been no growth. Has since been transferred to ICU with increased pressor requirement. Blood cultures repeated on 11/24 are no growth x 24 hours.   11/29 Switched from zosyn to meropenem due to concern of thrombocytopenia.   Finished meropenem course 12/8 @ 6 PM   Never smoker

## 2024-12-09 NOTE — ASSESSMENT & PLAN NOTE
This patient does have evidence of infective focus  My overall impression is sepsis.  Source: Abdominal    Organ dysfunction indicated by Acute kidney injury    Fluid challenge Contraindicated- Fluid bolus is contraindicated in this patient due to End Stage Liver Disease     Post- resuscitation assessment No - Post resuscitation assessment not needed     Source control achieved by: antibiotics  Patient finished course of abx (meropenem) 12/8

## 2024-12-09 NOTE — ASSESSMENT & PLAN NOTE
This patient does have evidence of infective focus  My overall impression is sepsis.  Source: Abdominal  Antibiotics given-   Antibiotics (72h ago, onward)      None          Organ dysfunction indicated by Acute kidney injury    Fluid challenge Contraindicated- Fluid bolus is contraindicated in this patient due to End Stage Liver Disease     Post- resuscitation assessment No - Post resuscitation assessment not needed     Source control achieved by: antibiotics  Patient finished course of abx (meropenem) 12/8

## 2024-12-09 NOTE — PLAN OF CARE
Problem: Physical Therapy  Goal: Physical Therapy Goal  Description: Goals to be met by: 24     Patient will increase functional independence with mobility by performin. Supine to sit with Minimal Assistance  2. Sit to stand transfer with Stand By Assistance  3. Bed to chair transfer with Stand By Assistance using LRAD  4. Gait  x 150 feet with Stand By Assistance using No LRAD.   5. Pt sit on EOB x 15 min with SBA and perform functional activities to increased functional mobility.     Outcome: Progressing   Goals updated for content and are appropriate. 2024

## 2024-12-09 NOTE — PROGRESS NOTES
12/09/24 0231   Treatment   Treatment Type SCUF   Treatment Status Daily equipment check   Dialysis Machine Number [id number]   Dialyzer Time (hours) 9.57   BVP (Liters) 0 L   Solutions Labeled and Current  Yes   Access Temporary Cath;Right;IJ   Catheter Dressing Intact  Yes   Alarms Engaged Yes   CRRT Comments daily  check done     Ongoing SCUF. Orders and machine settings verified. Daily equipment check and maintenance done.

## 2024-12-09 NOTE — PROGRESS NOTES
[facility] - Medical ICU  Critical Care Medicine  Progress Note    Patient Name: Juan Carlos Yoo Sr.  MRN: [id number]  Admission Date: 11/20/2024  Hospital Length of Stay: 19 days  Code Status: Full Code  Attending Provider: Mu Bird MD  Primary Care Provider: Nyla Rios FNP   Principal Problem: Decompensated cirrhosis    Subjective:     HPI:  Juan Carlos Yoo is a 69 male with PMH of alcoholic cirrhosis, esophageal varices, Afib on eliquis, and HTN who presents for c/o worsening diffuse swelling as well as R arm pain/erythema. He was recently hospitalized 1 month ago at [facility] for volume overload in the setting of decompensated cirrhosis. He was treated with IV diuresis and discharged with adjustment to his diuretics, currently on lasix 60mg BID and metolazone 2.5mg every other week as per family. Patient reports diffuse swelling of his upper and lower extremities in addition to distended abdomen and worsening dyspnea, which he believes is due to recent medication adjustment. Family states he is non-compliant with low sodium diet and fluid restriction. Family states he has been compliant with diuretics, but rom't taken eliquis for 3 days due to issue getting refill. He also reports scratching right arm on door frame 3-4 days ago which has become red and painful after wrapping in in bandage. He claims to be compliant with medications, but is a poor historian. He follows with hepatology, not currently active on transplant list. He states he hasn't consumed alcohol for at least 9 months. Has c/o chills, but denies fever, cough, nausea, vomiting, chest pain, dysuria, hematuria, blood in stool. Workup in ED remarkable for VS: T 97.6 HR 94 RR 22 BP 95/56 O2 sat 97% on RA. Hb 6.7, MCV 75, Plt 81, PT/INR 22.6/2.2, Na 128, K 6.1, BUN/Cr 49/5.7, Alb 2.8, AST/ALT 35/9, Ammonia 104, , Trop neg, LA 2.9, CXR: Cardiac size is enlarged similar to prior.  No large volume of pleural fluid noted although there is mild blunting  of the right costophrenic angle which may relate to a small amount of pleural fluid.  Suspected right basilar opacity, to be correlated clinically for infection. RUE venous doppler: No thrombus in central veins of the right upper extremity. Patient received vanc/zosyn and lasix 80mg IVP in ED and will be admitted to hospital medicine service for further management.     Pt was admitted to hospital medicine. Blood cultures positive for Gram-positive cocci and Gram-negative rods. ID has been consulted. S/p 2 units PRBC for Hemoglobin dropped 6.8 on 11/21. Pt was on Eliquis for atrial fibrillation prior to admission which was held.  Hepatology following patient's clinical course, but are not evaluating him for transplant at this time. Diuretics were held because of concern for HRS.  Patient was started on albumin and midodrine per Nephrology recommendations for HRS.  Renal function continued to worsen and recommendation per Nephrology to transfer to ICU for maintaining goal map over 85 on Levophed.  ICU was notified and patient to be assessed to be transferred to ICU.       Hospital/ICU Course:  69 y.o. male with history of EtOH use disorder, EtOH cirrhosis, HCC s/p y90, morbid obesity BMI 44, HTN, and Afib who presents with severe anasarca and JAE.      Appreciate hepatology and nephrology recommendations.  Diuretics were held because of concern for HRS.  Patient was started on albumin and midodrine per Nephrology recommendations for HRS.  Renal function continued to worsen and recommendation per Nephrology to transfer to ICU for maintaining goal map over 85 on Levophed.  ICU was notified and patient to be assessed to be transferred to ICU.     Patient also has Gram-positive cocci and Gram-negative rods in his blood now.  Possible sources could be got translocation and barrier related infection through skin as he has been significantly edematous and has been oozing.  Has been on vancomycin and Rocephin.  Id was  consulted this morning.  Repeat blood cultures were obtained.     Continues to have anemia.  Hemoglobin dropped on 11/20 and was given 1 unit of packed red blood cells.  Did not respond appropriately.  Hemoglobin dropped again to 6.8 on 11/21 and was given 1 unit of packed red blood cells.  Hemoglobin again on 11/22 is 7.2.  No reported melena or hematochezia.  Patient was on Eliquis for atrial fibrillation prior to admission which was held.  Given history of esophageal varices and portal hypertensive gastropathy whereas also could be component of slow bleeding, under production due to CKD and hemolysis while also with decompensated cirrhosis.  Started on Protonix IV b.i.d. and octreotide.     Also clarified with hepatology.  Given patient's current infection we will await ID recommendations however we will be challenging to consider transplant evaluation at this time.  Trend clinical course     Goal clarification with the patient and family.  Patient at this time wants to be full code and continue with Levophed in ICU.  They would consider discussion with palliative care in a day or so if things do not get better.     In MICU patient started on Levophed, however titration remained difficult given tachycardic response from the patient.     11/24 Trialysis and arterial lines placed overnight and currently on levo and norepi. SLED today.    11/25 Boo dc. Increased midodrine. Continue steroids until d/c pressors. Discussed with Nephrology about our concern for sustained use of pressors to achieve their MAP goals of 85. Will follow up tomorrow.     11/26 Platelets 48. Repeat 38 confirming thrombocytopenia. Concern for HIT. D/c heparin. Increased lactulose dosing. Bladder scan revealed urine in bladder. Boo placed. Off vaso. Continuing levo. Maintain MAP goals 80. PT/OT consulted.     11/27 MAP goal 75-80. Heparin antibody result pending.     11/28 Pt with abdominal pain, decreased bowel movements, emesis. Lactic  acid 2.9 and repeat 2.7. Less concerned for mesenteric ischemia and more of a concern was for SBO. NG tube placed with subsequent suction of 500mL fluid. Abdomen less distended after placement and pt subsequently had bowel movement. MAP Goal of 60 with plan to come off pressors entirely and switch to dialysis after permacath, as he is not  liver transplant eligible at this time.     11/29 Pt with ileus. Clear liquids for now. Platelets 24. HIT versus zosyn induced? Peripheral smear sent. Zosyn changed to kaylah. Consent and transfuse 1U. GOC conversation today. Pt opting for long term dialysis.     11/30 naeon. Started back on heparin. One time dose of 120mg lasix today. Hold off SLED/SCUF today and give IV lasix 120 mg once; plan for SLED/SCUF tomorrow     12/1 Patient is becoming more dependent on dialysis. Not a current liver transplant candidate. Still complaining of abdominal pain after discontinuing the NGT. AXR with evidence of dilated bowel loops. Brown bomb administered. The days following administration of the brown bomb, patient had more frequent bowel movements and was able to pass flatulence. Constipation eventually resolved.   Given the need for pressor support, he was worked up for adrenal insufficiency which was positive. Consequently, he was started on steroids for adrenal insufficiency. His blood pressures improved, however, he still required pressor support, particularly during dialysis.    Interval History/Significant Events: NAEON. Pt off levo. He reports having numerous BM overnight.     Denies HA, fevers, chills, chest pain, SOB, palpitations, abdominal pain, N/V.     Review of Systems  Objective:     Vital Signs (Most Recent):  Temp: 97.1 °F (36.2 °C) (12/09/24 1101)  Pulse: 82 (12/09/24 1201)  Resp: 17 (12/09/24 1201)  BP: (!) 109/42 (12/08/24 0935)  SpO2: 100 % (12/09/24 1201) Vital Signs (24h Range):  Temp:  [96.1 °F (35.6 °C)-97.5 °F (36.4 °C)] 97.1 °F (36.2 °C)  Pulse:  [77-92] 82  Resp:   [12-25] 17  SpO2:  [93 %-100 %] 100 %  Arterial Line BP: ()/(37-45) 97/37   Weight: (!) 141 kg (310 lb 13.6 oz)  Body mass index is 41.01 kg/m².      Intake/Output Summary (Last 24 hours) at 12/9/2024 1245  Last data filed at 12/9/2024 1101  Gross per 24 hour   Intake 4153.9 ml   Output 4910 ml   Net -756.1 ml          Physical Exam  Vitals and nursing note reviewed.   Constitutional:       General: He is awake. He is not in acute distress.     Appearance: He is obese. He is ill-appearing. He is not toxic-appearing.   HENT:      Head: Normocephalic and atraumatic.   Eyes:      General: No scleral icterus.     Extraocular Movements: Extraocular movements intact.      Conjunctiva/sclera: Conjunctivae normal.   Cardiovascular:      Rate and Rhythm: Normal rate. Rhythm irregular.   Pulmonary:      Effort: Pulmonary effort is normal. No respiratory distress.   Abdominal:      General: There is distension.      Palpations: Abdomen is soft.      Tenderness: There is no abdominal tenderness. There is no guarding or rebound.   Musculoskeletal:         General: No swelling or tenderness.      Right lower leg: Edema present.      Left lower leg: Edema present.      Comments: 2+ pitting edema in bilateral lower extremities   Skin:     General: Skin is warm.      Coloration: Skin is not jaundiced.      Findings: Bruising present.   Neurological:      Mental Status: He is alert and oriented to person, place, and time.      Motor: No weakness.            Vents:     Lines/Drains/Airways       Central Venous Catheter Line  Duration             Trialysis (Dialysis) Catheter 11/24/24 0441 right internal jugular 15 days              Drain  Duration             Male External Urinary Catheter 12/08/24 1532 <1 day              Arterial Line  Duration             Arterial Line 11/24/24 0317 Right Radial 15 days              Peripheral Intravenous Line  Duration                  Peripheral IV - Single Lumen 11/27/24 1101 20 G  "Anterior;Left Forearm 12 days                  Significant Labs:    CBC/Anemia Profile:  Recent Labs   Lab 12/08/24 0307 12/09/24  0246   WBC 7.58 8.39   HGB 7.2* 7.2*   HCT 22.7* 22.8*   PLT 32* 37*   MCV 79* 79*   RDW 25.0* 25.2*        Chemistries:  Recent Labs   Lab 12/08/24  0307 12/08/24  1320 12/08/24 2129 12/09/24  0246   *  132*  132* 132* 132*  132*  132* 133*  132*  132*  132*  132*  134*   K 3.9  3.9  3.9 3.7 3.7  3.7  3.7 3.8  3.7  3.7  3.7  3.7  3.8     106  106 107 107  107  107 109  107  107  107  107  109   CO2 19*  19*  19* 15* 16*  16*  16* 16*  16*  16*  16*  16*  16*   BUN 6*  6*  6* 7* 8  8  8 8  8  8  8  8  8   CREATININE 2.1*  2.1*  2.1* 2.5* 2.4*  2.4*  2.4* 2.5*  2.5*  2.5*  2.5*  2.5*  2.5*   CALCIUM 8.3*  8.3*  8.3* 8.4* 8.3*  8.3*  8.3* 8.3*  8.4*  8.4*  8.4*  8.4*  8.3*   ALBUMIN 2.3*  2.3*  2.3* 2.3* 2.3*  2.3*  2.3* 2.3*  2.3*  2.3*  2.3*  2.3*  2.3*   PROT 5.4*  --   --  5.6*   BILITOT 2.7*  --   --  2.6*   ALKPHOS 60  --   --  70   ALT 17  --   --  17   AST 34  --   --  44*   MG 2.0  2.0  --  1.9  1.9  1.9 2.3  2.3  2.3  2.3  2.3  2.3   PHOS 3.4  3.4  3.4 3.7 4.1  4.1  4.1 4.1  4.2  4.2  4.2  4.2  4.1       All pertinent labs within the past 24 hours have been reviewed.    Significant Imaging:  I have reviewed all pertinent imaging results/findings within the past 24 hours.    ABG  No results for input(s): "PH", "PO2", "PCO2", "HCO3", "BE" in the last 168 hours.  Assessment/Plan:     Neuro  Encephalopathy, metabolic  Resolved  AAOx4  Will assess for signs of encephalopathy     Cardiac/Vascular  Atrial fibrillation  Holding home Eliquis in the setting of recent low blood counts  Continue metoprolol 12.5 mg BID    Renal/  Stage 3a chronic kidney disease  Nephrology following  Will try to remove more fluid  Strict intake and output  Renally dose all medications  Avoid nephrotoxic " agents  Discuss with nephrology placement of tunneled line for HD    JAE (acute kidney injury)  Baseline creatinine is 1.5. May require dialysis long term.   Nephro recs 12/8:  12 hrs SCUF last night. Still net positive. Will start continuous SCUF for volume removal. 350-400 cc/hr. Levophed as needed to maintain SBP>65.   Avoid nephrotoxins and renally dose meds for GFR listed above  Monitor urine output, serial BMP, and adjust therapy as needed    ID  Gram-negative bacteremia  Blood cultures from admission with B. Diminuta, micrococcus luteus, lysinobacillus species. Seen by ID previously who recommended stopping antibiotics due to concern these were contaminants. Repeat blood cultures have been no growth. Has since been transferred to ICU with increased pressor requirement. Blood cultures repeated on 11/24 are no growth x 24 hours.   11/29 Switched from zosyn to meropenem due to concern of thrombocytopenia.   Finished meropenem course 12/8 @ 6 PM    Severe sepsis  This patient does have evidence of infective focus  My overall impression is sepsis.  Source: Abdominal    Organ dysfunction indicated by Acute kidney injury    Fluid challenge Contraindicated- Fluid bolus is contraindicated in this patient due to End Stage Liver Disease     Post- resuscitation assessment No - Post resuscitation assessment not needed     Source control achieved by: antibiotics  Patient finished course of abx (meropenem) 12/8    Hematology  Thrombocytopenia  Daily CBC  Transfuse for PLT<10k, or PLT<50K with active bleeding  Monitor for signs and symptoms of bleeding    Coagulopathy  End Stage Liver Disease precipitated coagulopathy  Heparin d/c d/t thrombocytopenia  CTM    Endocrine  Adrenal insufficiency  On going problem since admission, most likely multifactorial - component of liver dysfunction, HRS, sepsis on admission.  Continues to require levo, midodrine has been up titrated.   Random cortisol was 6   Persistent hypotension  thought to be adrenal insufficiency.   Continue steroids      Hyponatremia  Likely dilutional secondary to end stage liver disease and HRS.   CTM, correct if appropriate    GI  * Decompensated cirrhosis  MELD 3.0: 32 at 11/29/2024  9:57 PM  MELD-Na: 31 at 11/29/2024  9:57 PM  Calculated from:  Serum Creatinine: 3 mg/dL at 11/29/2024  9:57 PM  Serum Sodium: 132 mmol/L at 11/29/2024  9:57 PM  Total Bilirubin: 2.9 mg/dL at 11/29/2024 10:34 AM  Serum Albumin: 2.8 g/dL at 11/29/2024  9:57 PM  INR(ratio): 2 at 11/27/2024  2:09 AM  Age at listing (hypothetical): 69 years  Sex: Male at 11/29/2024  9:57 PM      Hepatology following, pt not transplant eligible at this time.       Abdominal pain  Patient developed abdominal pain 2/2 constipation last week and NGT was placed. 11/30 NGT was removed. The day after the NGT was removed he developed constipation and dilated bowel loops on AXR.  Some BM after brown bomb enema.   CTM  Lactulose TID  Last BM 12/8    Palliative Care  Palliative care encounter  Will coordinate GOC discussion    Goals of care, counseling/discussion  Advance Care Planning  Will plan to coordinate with hepatology and palliative.            Critical Care Daily Checklist:    A: Awake: RASS Goal/Actual Goal: RASS Goal: 0-->alert and calm  Actual:     B: Spontaneous Breathing Trial Performed?     C: SAT & SBT Coordinated?                  D: Delirium: CAM-ICU Overall CAM-ICU: Negative   E: Early Mobility Performed? Yes   F: Feeding Goal: Goals: to meet % of EEN/EPN by next RD f/u  Status: Nutrition Goal Status: goal not met   Current Diet Order   Procedures    Diet Renal Standard Tray     Order Specific Question:   Tray type:     Answer:   Standard Tray      AS: Analgesia/Sedation    T: Thromboembolic Prophylaxis    H: HOB > 300 Yes   U: Stress Ulcer Prophylaxis (if needed) Yes   G: Glucose Control    B: Bowel Function Stool Occurrence: 2   I: Indwelling Catheter (Lines & Boo) Necessity Trialysis, PIV    D: De-escalation of Antimicrobials/Pharmacotherapies     Plan for the day/ETD End goals of treatment, tunneled line for HD    Code Status:  Family/Goals of Care: Full Code         Critical secondary to Patient has a condition that poses threat to life and bodily function: Acute Renal Failure      Critical care was time spent personally by me on the following activities: development of treatment plan with patient or surrogate and bedside caregivers, discussions with consultants, evaluation of patient's response to treatment, examination of patient, ordering and performing treatments and interventions, ordering and review of laboratory studies, ordering and review of radiographic studies, pulse oximetry, re-evaluation of patient's condition. This critical care time did not overlap with that of any other provider or involve time for any procedures.     Rhona Lewis, DO  Critical Care Medicine  [facility] - Medical ICU

## 2024-12-09 NOTE — SUBJECTIVE & OBJECTIVE
Interval History/Significant Events: NAEON. Pt off levo. He reports having numerous BM overnight.     Denies HA, fevers, chills, chest pain, SOB, palpitations, abdominal pain, N/V.     Review of Systems  Objective:     Vital Signs (Most Recent):  Temp: 97.1 °F (36.2 °C) (12/09/24 1101)  Pulse: 82 (12/09/24 1201)  Resp: 17 (12/09/24 1201)  BP: (!) 109/42 (12/08/24 0935)  SpO2: 100 % (12/09/24 1201) Vital Signs (24h Range):  Temp:  [96.1 °F (35.6 °C)-97.5 °F (36.4 °C)] 97.1 °F (36.2 °C)  Pulse:  [77-92] 82  Resp:  [12-25] 17  SpO2:  [93 %-100 %] 100 %  Arterial Line BP: ()/(37-45) 97/37   Weight: (!) 141 kg (310 lb 13.6 oz)  Body mass index is 41.01 kg/m².      Intake/Output Summary (Last 24 hours) at 12/9/2024 1245  Last data filed at 12/9/2024 1101  Gross per 24 hour   Intake 4153.9 ml   Output 4910 ml   Net -756.1 ml          Physical Exam  Vitals and nursing note reviewed.   Constitutional:       General: He is awake. He is not in acute distress.     Appearance: He is obese. He is ill-appearing. He is not toxic-appearing.   HENT:      Head: Normocephalic and atraumatic.   Eyes:      General: No scleral icterus.     Extraocular Movements: Extraocular movements intact.      Conjunctiva/sclera: Conjunctivae normal.   Cardiovascular:      Rate and Rhythm: Normal rate. Rhythm irregular.   Pulmonary:      Effort: Pulmonary effort is normal. No respiratory distress.   Abdominal:      General: There is distension.      Palpations: Abdomen is soft.      Tenderness: There is no abdominal tenderness. There is no guarding or rebound.   Musculoskeletal:         General: No swelling or tenderness.      Right lower leg: Edema present.      Left lower leg: Edema present.      Comments: 2+ pitting edema in bilateral lower extremities   Skin:     General: Skin is warm.      Coloration: Skin is not jaundiced.      Findings: Bruising present.   Neurological:      Mental Status: He is alert and oriented to person, place, and  time.      Motor: No weakness.            Vents:     Lines/Drains/Airways       Central Venous Catheter Line  Duration             Trialysis (Dialysis) Catheter 11/24/24 0441 right internal jugular 15 days              Drain  Duration             Male External Urinary Catheter 12/08/24 1532 <1 day              Arterial Line  Duration             Arterial Line 11/24/24 0317 Right Radial 15 days              Peripheral Intravenous Line  Duration                  Peripheral IV - Single Lumen 11/27/24 1101 20 G Anterior;Left Forearm 12 days                  Significant Labs:    CBC/Anemia Profile:  Recent Labs   Lab 12/08/24  0307 12/09/24  0246   WBC 7.58 8.39   HGB 7.2* 7.2*   HCT 22.7* 22.8*   PLT 32* 37*   MCV 79* 79*   RDW 25.0* 25.2*        Chemistries:  Recent Labs   Lab 12/08/24  0307 12/08/24  1320 12/08/24  2129 12/09/24  0246   *  132*  132* 132* 132*  132*  132* 133*  132*  132*  132*  132*  134*   K 3.9  3.9  3.9 3.7 3.7  3.7  3.7 3.8  3.7  3.7  3.7  3.7  3.8     106  106 107 107  107  107 109  107  107  107  107  109   CO2 19*  19*  19* 15* 16*  16*  16* 16*  16*  16*  16*  16*  16*   BUN 6*  6*  6* 7* 8  8  8 8  8  8  8  8  8   CREATININE 2.1*  2.1*  2.1* 2.5* 2.4*  2.4*  2.4* 2.5*  2.5*  2.5*  2.5*  2.5*  2.5*   CALCIUM 8.3*  8.3*  8.3* 8.4* 8.3*  8.3*  8.3* 8.3*  8.4*  8.4*  8.4*  8.4*  8.3*   ALBUMIN 2.3*  2.3*  2.3* 2.3* 2.3*  2.3*  2.3* 2.3*  2.3*  2.3*  2.3*  2.3*  2.3*   PROT 5.4*  --   --  5.6*   BILITOT 2.7*  --   --  2.6*   ALKPHOS 60  --   --  70   ALT 17  --   --  17   AST 34  --   --  44*   MG 2.0  2.0  --  1.9  1.9  1.9 2.3  2.3  2.3  2.3  2.3  2.3   PHOS 3.4  3.4  3.4 3.7 4.1  4.1  4.1 4.1  4.2  4.2  4.2  4.2  4.1       All pertinent labs within the past 24 hours have been reviewed.    Significant Imaging:  I have reviewed all pertinent imaging results/findings within the past 24 hours.

## 2024-12-09 NOTE — PLAN OF CARE
MICU DAILY GOALS     Family/Goals of care/Code Status   Code Status: Full Code    24H Vital Sign Range  Temp:  [96.1 °F (35.6 °C)-99.1 °F (37.3 °C)]   Pulse:  [77-92]   Resp:  [12-25]   BP: (109)/(42)   SpO2:  [95 %-99 %]   Arterial Line BP: ()/(35-45)      Shift Events (include procedures and significant events)   No acute events throughout shift. Levo off since 0245. SCUF running    AWAKE RASS: Goal - RASS Goal: 0-->alert and calm  Actual - RASS (Pedraza Agitation-Sedation Scale): alert and calm    Restraint necessity: Not necessary   BREATHE SBT: Not intubated    Coordinate A & B, analgesics/sedatives Pain: managed   SAT: Not intubated   Delirium CAM-ICU: Overall CAM-ICU: Negative   Early(intubated/ Progressive (non-intubated) Mobility MOVE Screen (INTUBATED ONLY): Not intubated    Activity: Activity Management: Arm raise - L1, Rolling - L1   Feeding/Nutrition Diet order: Diet/Nutrition Received: low saturated fat/low cholesterol, Specialty Diet/Nutrition Received: renal diet   Thrombus DVT prophylaxis: VTE Core Measure: (SCDs) Sequential compression device initiated/maintained   HOB Elevation Head of Bed (HOB) Positioning: HOB at 30-45 degrees   Ulcer Prophylaxis GI: yes   Glucose control managed Glycemic Management: oral hydration promoted   Skin Skin assessment:     Sacrum intact/not altered? Yes  Heels intact/not altered? Yes  Surgical wound? No    CHECK ONE!   (no altered skin or altered skin) and sub boxes:  [] No Altered Skin Integrity Present    []Prevention Measures Documented    [x] Altered Skin Integrity Present or Discovered   [x] LDA present in EPIC, daily doc completed              [x] LDA added if not in EPIC (describe wound).                    When describing wound, do not stage, use descriptive words only.    [] Wound Image Taken (required on admit,                   transfer/discharge and every Tuesday)    Wound Care Consulted? No   Bowel Function diarrhea    Indwelling Catheter  Necessity [REMOVED]      Urethral Catheter 11/26/24 1738-Reason for Continuing Urinary Catheterization: Urinary retention  [REMOVED]      Urethral Catheter 12/03/24 1607 Silicone 16 Fr.-Reason for Continuing Urinary Catheterization: Critically ill in ICU and requiring hourly monitoring of intake/output  [REMOVED]      Urethral Catheter 11/20/24 1802 Double-lumen-Reason for Continuing Urinary Catheterization: Urinary retention    Trialysis (Dialysis) Catheter 11/24/24 0441 right internal jugular-Line Necessity Review: CRRT/HD  yes   De-escalation Antibiotics No        VS and assessment per flow sheet, patient progressing towards goals as tolerated, plan of care reviewed with Juan Carlos Yoo, all concerns addressed, will continue to monitor.

## 2024-12-09 NOTE — ASSESSMENT & PLAN NOTE
Nephrology following  Will try to remove more fluid  Strict intake and output  Renally dose all medications  Avoid nephrotoxic agents  Discuss with nephrology placement of tunneled line for HD

## 2024-12-09 NOTE — PROGRESS NOTES
[facility]  Critical Care Medicine  Progress Note    Patient Name: Juan Carlos Yoo Sr.  MRN: [id number]  Admission Date: 11/20/2024  Hospital Length of Stay: 18 days  Code Status: Full Code  Attending Provider: Evelyn Amos MD  Primary Care Provider: Nyla Rios FNP   Principal Problem: Decompensated cirrhosis    Subjective:     HPI:  Juan Carlos Yoo is a 69 male with PMH of alcoholic cirrhosis, esophageal varices, Afib on eliquis, and HTN who presents for c/o worsening diffuse swelling as well as R arm pain/erythema. He was recently hospitalized 1 month ago at [facility] for volume overload in the setting of decompensated cirrhosis. He was treated with IV diuresis and discharged with adjustment to his diuretics, currently on lasix 60mg BID and metolazone 2.5mg every other week as per family. Patient reports diffuse swelling of his upper and lower extremities in addition to distended abdomen and worsening dyspnea, which he believes is due to recent medication adjustment. Family states he is non-compliant with low sodium diet and fluid restriction. Family states he has been compliant with diuretics, but rom't taken eliquis for 3 days due to issue getting refill. He also reports scratching right arm on door frame 3-4 days ago which has become red and painful after wrapping in in bandage. He claims to be compliant with medications, but is a poor historian. He follows with hepatology, not currently active on transplant list. He states he hasn't consumed alcohol for at least 9 months. Has c/o chills, but denies fever, cough, nausea, vomiting, chest pain, dysuria, hematuria, blood in stool. Workup in ED remarkable for VS: T 97.6 HR 94 RR 22 BP 95/56 O2 sat 97% on RA. Hb 6.7, MCV 75, Plt 81, PT/INR 22.6/2.2, Na 128, K 6.1, BUN/Cr 49/5.7, Alb 2.8, AST/ALT 35/9, Ammonia 104, , Trop neg, LA 2.9, CXR: Cardiac size is enlarged similar to prior.  No large volume of pleural fluid noted although there is mild  blunting of the right costophrenic angle which may relate to a small amount of pleural fluid.  Suspected right basilar opacity, to be correlated clinically for infection. RUE venous doppler: No thrombus in central veins of the right upper extremity. Patient received vanc/zosyn and lasix 80mg IVP in ED and will be admitted to hospital medicine service for further management.     Pt was admitted to hospital medicine. Blood cultures positive for Gram-positive cocci and Gram-negative rods. ID has been consulted. S/p 2 units PRBC for Hemoglobin dropped 6.8 on 11/21. Pt was on Eliquis for atrial fibrillation prior to admission which was held.  Hepatology following patient's clinical course, but are not evaluating him for transplant at this time. Diuretics were held because of concern for HRS.  Patient was started on albumin and midodrine per Nephrology recommendations for HRS.  Renal function continued to worsen and recommendation per Nephrology to transfer to ICU for maintaining goal map over 85 on Levophed.  ICU was notified and patient to be assessed to be transferred to ICU.       Hospital/ICU Course:  69 y.o. male with history of EtOH use disorder, EtOH cirrhosis, HCC s/p y90, morbid obesity BMI 44, HTN, and Afib who presents with severe anasarca and JAE.      Appreciate hepatology and nephrology recommendations.  Diuretics were held because of concern for HRS.  Patient was started on albumin and midodrine per Nephrology recommendations for HRS.  Renal function continued to worsen and recommendation per Nephrology to transfer to ICU for maintaining goal map over 85 on Levophed.  ICU was notified and patient to be assessed to be transferred to ICU.     Patient also has Gram-positive cocci and Gram-negative rods in his blood now.  Possible sources could be got translocation and barrier related infection through skin as he has been significantly edematous and has been oozing.  Has been on vancomycin and Rocephin.  Id  was consulted this morning.  Repeat blood cultures were obtained.     Continues to have anemia.  Hemoglobin dropped on 11/20 and was given 1 unit of packed red blood cells.  Did not respond appropriately.  Hemoglobin dropped again to 6.8 on 11/21 and was given 1 unit of packed red blood cells.  Hemoglobin again on 11/22 is 7.2.  No reported melena or hematochezia.  Patient was on Eliquis for atrial fibrillation prior to admission which was held.  Given history of esophageal varices and portal hypertensive gastropathy whereas also could be component of slow bleeding, under production due to CKD and hemolysis while also with decompensated cirrhosis.  Started on Protonix IV b.i.d. and octreotide.     Also clarified with hepatology.  Given patient's current infection we will await ID recommendations however we will be challenging to consider transplant evaluation at this time.  Trend clinical course     Goal clarification with the patient and family.  Patient at this time wants to be full code and continue with Levophed in ICU.  They would consider discussion with palliative care in a day or so if things do not get better.     In MICU patient started on Levophed, however titration remained difficult given tachycardic response from the patient.     11/24 Trialysis and arterial lines placed overnight and currently on levo and norepi. SLED today.    11/25 Boo dc. Increased midodrine. Continue steroids until d/c pressors. Discussed with Nephrology about our concern for sustained use of pressors to achieve their MAP goals of 85. Will follow up tomorrow.     11/26 Platelets 48. Repeat 38 confirming thrombocytopenia. Concern for HIT. D/c heparin. Increased lactulose dosing. Bladder scan revealed urine in bladder. Boo placed. Off vaso. Continuing levo. Maintain MAP goals 80. PT/OT consulted.     11/27 MAP goal 75-80. Heparin antibody result pending.     11/28 Pt with abdominal pain, decreased bowel movements, emesis. Lactic  acid 2.9 and repeat 2.7. Less concerned for mesenteric ischemia and more of a concern was for SBO. NG tube placed with subsequent suction of 500mL fluid. Abdomen less distended after placement and pt subsequently had bowel movement. MAP Goal of 60 with plan to come off pressors entirely and switch to dialysis after permacath, as he is not  liver transplant eligible at this time.     11/29 Pt with ileus. Clear liquids for now. Platelets 24. HIT versus zosyn induced? Peripheral smear sent. Zosyn changed to kaylah. Consent and transfuse 1U. GOC conversation today. Pt opting for long term dialysis.     11/30 naeon. Started back on heparin. One time dose of 120mg lasix today. Hold off SLED/SCUF today and give IV lasix 120 mg once; plan for SLED/SCUF tomorrow     12/1 Patient is becoming more dependent on dialysis. Not a current liver transplant candidate. Still complaining of abdominal pain after discontinuing the NGT. AXR with evidence of dilated bowel loops. Brown bomb administered. The days following administration of the brown bomb, patient had more frequent bowel movements and was able to pass flatulence. Constipation eventually resolved.   Given the need for pressor support, he was worked up for adrenal insufficiency which was positive. Consequently, he was started on steroids for adrenal insufficiency. His blood pressures improved, however, he still required pressor support, particularly during dialysis.    Interval History/Significant Events: NAOE. Denies any abd pain, CP, SOB today. Feeling well, talkative. VSS. On 0.01 Levo.    Review of Systems  Denies fever, chills, CP, SOB, abd pain.  Objective:     Vital Signs (Most Recent):  Temp: 97.5 °F (36.4 °C) (12/08/24 1500)  Pulse: 82 (12/08/24 1800)  Resp: 12 (12/08/24 1800)  BP: (!) 109/42 (12/08/24 0935)  SpO2: 96 % (12/08/24 1800) Vital Signs (24h Range):  Temp:  [96.8 °F (36 °C)-99.1 °F (37.3 °C)] 97.5 °F (36.4 °C)  Pulse:  [75-89] 82  Resp:  [12-19] 12  SpO2:   [95 %-98 %] 96 %  BP: (109-122)/(41-42) 109/42  Arterial Line BP: ()/(35-44) 122/44   Weight: (!) 141 kg (310 lb 13.6 oz)  Body mass index is 41.01 kg/m².      Intake/Output Summary (Last 24 hours) at 12/8/2024 1835  Last data filed at 12/8/2024 1735  Gross per 24 hour   Intake 3669.57 ml   Output 4244 ml   Net -574.43 ml          Physical Exam   Constitutional:       General: He is not in acute distress.     Appearance: He is ill-appearing. He is not toxic-appearing.   HENT:      Head: Normocephalic and atraumatic.   Eyes:      Extraocular Movements: Extraocular movements intact.      Pupils: Pupils are equal, round, and reactive to light.   Cardiovascular:      Rate and Rhythm: Normal rate.   Pulmonary:      Effort: No respiratory distress.      Breath sounds: No wheezing or rales.   Abdominal:      General: protuberant abdomen.     Tenderness: There is no abdominal tenderness. There is no rebound or guarding.   Musculoskeletal:         General: Swelling present.      Right lower leg: Edema present.      Left lower leg: Edema present.   Skin:     Coloration: No rash. Skin is not jaundiced.   Neurological:      Mental Status: He is oriented to person, place, and time.   Psychiatric:         Behavior: Behavior normal.     Vents:     Lines/Drains/Airways       Central Venous Catheter Line  Duration             Trialysis (Dialysis) Catheter 11/24/24 0441 right internal jugular 14 days              Drain  Duration             Male External Urinary Catheter 12/08/24 1532 <1 day              Arterial Line  Duration             Arterial Line 11/24/24 0317 Right Radial 14 days              Peripheral Intravenous Line  Duration                  Peripheral IV - Single Lumen 11/27/24 1101 20 G Anterior;Left Forearm 11 days                  Significant Labs:    CBC/Anemia Profile:  Recent Labs   Lab 12/07/24  0319 12/08/24  0307   WBC 8.64 7.58   HGB 7.2* 7.2*   HCT 22.5* 22.7*   PLT 39* 32*   MCV 78* 79*   RDW 24.8*  "25.0*        Chemistries:  Recent Labs   Lab 12/07/24  0319 12/07/24  1421 12/07/24  2109 12/08/24  0307 12/08/24  1320   *  133* 132* 136  136 132*  132*  132* 132*   K 3.3*  3.3* 3.4* 4.0  4.0 3.9  3.9  3.9 3.7     106 107 109  109 106  106  106 107   CO2 20*  20* 18* 18*  18* 19*  19*  19* 15*   BUN 4*  4* 5* 5*  5* 6*  6*  6* 7*   CREATININE 1.2  1.1 1.8* 1.9*  1.9* 2.1*  2.1*  2.1* 2.5*   CALCIUM 7.4*  7.5* 7.8* 8.0*  8.0* 8.3*  8.3*  8.3* 8.4*   ALBUMIN 2.2*  2.2* 2.2* 2.2*  2.2* 2.3*  2.3*  2.3* 2.3*   PROT 5.3*  --   --  5.4*  --    BILITOT 2.9*  --   --  2.7*  --    ALKPHOS 57  --   --  60  --    ALT 12  --   --  17  --    AST 32  --   --  34  --    MG 2.3 2.0 2.0  2.0 2.0  2.0  --    PHOS 3.6  3.7 3.0 3.3  3.3 3.4  3.4  3.4 3.7       All pertinent labs within the past 24 hours have been reviewed.    Significant Imaging:  I have reviewed all pertinent imaging results/findings within the past 24 hours.    ABG  No results for input(s): "PH", "PO2", "PCO2", "HCO3", "BE" in the last 168 hours.  Assessment/Plan:     Neuro  Encephalopathy, metabolic  Resolved  AAOx4  Will assess for signs of encephalopathy     Cardiac/Vascular  Hypotension  On going problem since admission, most likely multifactorial - component of liver dysfunction, HRS, sepsis on admission.  Continues to require levo, midodrine has been up titrated.   Random cortisol was 6   Persistent hypotension thought to be adrenal insufficiency.   Continue steroids      Atrial fibrillation  Holding home Eliquis in the setting of recent low blood counts  Continue metoprolol 12.5 mg BID    Renal/  Stage 3a chronic kidney disease  Nephrology following  Will try to remove more fluid  Strict intake and output  Renally dose all medications  Avoid nephrotoxic agents    JAE (acute kidney injury)  Baseline creatinine is 1.5. May require dialysis long term.   Nephro recs 12/8:  12 hrs SCUF last night. Still net " positive. Will start continuous SCUF for volume removal. 350-400 cc/hr. Levophed as needed to maintain SBP>65.   Avoid nephrotoxins and renally dose meds for GFR listed above  Monitor urine output, serial BMP, and adjust therapy as needed    ID  Gram-negative bacteremia  Blood cultures from admission with B. Diminuta, micrococcus luteus, lysinobacillus species. Seen by ID previously who recommended stopping antibiotics due to concern these were contaminants. Repeat blood cultures have been no growth. Has since been transferred to ICU with increased pressor requirement. Blood cultures repeated on 11/24 are no growth x 24 hours.   11/29 Switched from zosyn to meropenem due to concern of thrombocytopenia.   Finished meropenem course 12/8 @ 6 PM    Severe sepsis  This patient does have evidence of infective focus  My overall impression is sepsis.  Source: Abdominal  Antibiotics given-   Antibiotics (72h ago, onward)      None          Organ dysfunction indicated by Acute kidney injury    Fluid challenge Contraindicated- Fluid bolus is contraindicated in this patient due to End Stage Liver Disease     Post- resuscitation assessment No - Post resuscitation assessment not needed     Source control achieved by: antibiotics  Patient finished course of abx (meropenem) 12/8    Hematology  Thrombocytopenia  Daily CBC  Transfuse for PLT<10k, or PLT<50K with active bleeding  Monitor for signs and symptoms of bleeding    Coagulopathy  End Stage Liver Disease precipitated coagulopathy  Heparin d/c d/t thrombocytopenia  CTM    Endocrine  Hyponatremia  Likely dilutional secondary to end stage liver disease and HRS.   CTM, correct if appropriate    GI  * Decompensated cirrhosis  MELD 3.0: 32 at 11/29/2024  9:57 PM  MELD-Na: 31 at 11/29/2024  9:57 PM  Calculated from:  Serum Creatinine: 3 mg/dL at 11/29/2024  9:57 PM  Serum Sodium: 132 mmol/L at 11/29/2024  9:57 PM  Total Bilirubin: 2.9 mg/dL at 11/29/2024 10:34 AM  Serum Albumin: 2.8  g/dL at 11/29/2024  9:57 PM  INR(ratio): 2 at 11/27/2024  2:09 AM  Age at listing (hypothetical): 69 years  Sex: Male at 11/29/2024  9:57 PM      Hepatology following, pt not transplant eligible at this time.       Abdominal pain  Patient developed abdominal pain 2/2 constipation last week and NGT was placed. 11/30 NGT was removed. The day after the NGT was removed he developed constipation and dilated bowel loops on AXR.  Some BM after brown bomb enema.   CTM  Lactulose TID  Last BM 12/8    Palliative Care  Palliative care encounter  Will coordinate GOC discussion    Goals of care, counseling/discussion  Advance Care Planning  Will plan to coordinate with hepatology and palliative.                Critical Care Daily Checklist:     A: Awake: RASS Goal/Actual Goal: RASS Goal: 0-->alert and calm  Actual:     B: Spontaneous Breathing Trial Performed?    C: SAT & SBT Coordinated?                   D: Delirium: CAM-ICU Overall CAM-ICU: Negative   E: Early Mobility Performed? Yes   F: Feeding Goal: Goals: to meet % of EEN/EPN by next RD f/u  Status: Nutrition Goal Status: goal not met         Current Diet Order   Procedures    Diet Renal Standard Tray       Order Specific Question:   Tray type:       Answer:   Standard Tray      AS: Analgesia/Sedation  None   T: Thromboembolic Prophylaxis No   H: HOB > 300 Yes   U: Stress Ulcer Prophylaxis (if needed) Yes   G: Glucose Control     B: Bowel Function Stool Occurrence: 12/8; cont lactulose   I: Indwelling Catheter (Lines & Boo) Necessity Trialysis, PIV, Boo   D: De-escalation of Antimicrobials/Pharmacotherapies Meropenem finished this evening @ 6 PM     Plan for the day/ETD Fluid removal     Code Status:  Family/Goals of Care: Full Code         Critical secondary to Patient has a condition that poses threat to life and bodily function: acute renal failure      Critical care was time spent personally by me on the following activities: development of treatment plan  with patient or surrogate and bedside caregivers, discussions with consultants, evaluation of patient's response to treatment, examination of patient, ordering and performing treatments and interventions, ordering and review of laboratory studies, ordering and review of radiographic studies, pulse oximetry, re-evaluation of patient's condition. This critical care time did not overlap with that of any other provider or involve time for any procedures.     Zoie Madison MD  Critical Care Medicine  [facility]

## 2024-12-09 NOTE — SUBJECTIVE & OBJECTIVE
Interval History/Significant Events: NAOE. Denies any abd pain, CP, SOB today. Feeling well, talkative. VSS. On 0.01 Levo.    Review of Systems  Denies fever, chills, CP, SOB, abd pain.  Objective:     Vital Signs (Most Recent):  Temp: 97.5 °F (36.4 °C) (12/08/24 1500)  Pulse: 82 (12/08/24 1800)  Resp: 12 (12/08/24 1800)  BP: (!) 109/42 (12/08/24 0935)  SpO2: 96 % (12/08/24 1800) Vital Signs (24h Range):  Temp:  [96.8 °F (36 °C)-99.1 °F (37.3 °C)] 97.5 °F (36.4 °C)  Pulse:  [75-89] 82  Resp:  [12-19] 12  SpO2:  [95 %-98 %] 96 %  BP: (109-122)/(41-42) 109/42  Arterial Line BP: ()/(35-44) 122/44   Weight: (!) 141 kg (310 lb 13.6 oz)  Body mass index is 41.01 kg/m².      Intake/Output Summary (Last 24 hours) at 12/8/2024 1835  Last data filed at 12/8/2024 1735  Gross per 24 hour   Intake 3669.57 ml   Output 4244 ml   Net -574.43 ml          Physical Exam   Constitutional:       General: He is not in acute distress.     Appearance: He is ill-appearing. He is not toxic-appearing.   HENT:      Head: Normocephalic and atraumatic.   Eyes:      Extraocular Movements: Extraocular movements intact.      Pupils: Pupils are equal, round, and reactive to light.   Cardiovascular:      Rate and Rhythm: Normal rate.   Pulmonary:      Effort: No respiratory distress.      Breath sounds: No wheezing or rales.   Abdominal:      General: protuberant abdomen.     Tenderness: There is no abdominal tenderness. There is no rebound or guarding.   Musculoskeletal:         General: Swelling present.      Right lower leg: Edema present.      Left lower leg: Edema present.   Skin:     Coloration: No rash. Skin is not jaundiced.   Neurological:      Mental Status: He is oriented to person, place, and time.   Psychiatric:         Behavior: Behavior normal.     Vents:     Lines/Drains/Airways       Central Venous Catheter Line  Duration             Trialysis (Dialysis) Catheter 11/24/24 0441 right internal jugular 14 days              Drain   Duration             Male External Urinary Catheter 12/08/24 1532 <1 day              Arterial Line  Duration             Arterial Line 11/24/24 0317 Right Radial 14 days              Peripheral Intravenous Line  Duration                  Peripheral IV - Single Lumen 11/27/24 1101 20 G Anterior;Left Forearm 11 days                  Significant Labs:    CBC/Anemia Profile:  Recent Labs   Lab 12/07/24 0319 12/08/24  0307   WBC 8.64 7.58   HGB 7.2* 7.2*   HCT 22.5* 22.7*   PLT 39* 32*   MCV 78* 79*   RDW 24.8* 25.0*        Chemistries:  Recent Labs   Lab 12/07/24 0319 12/07/24  1421 12/07/24  2109 12/08/24  0307 12/08/24  1320   *  133* 132* 136  136 132*  132*  132* 132*   K 3.3*  3.3* 3.4* 4.0  4.0 3.9  3.9  3.9 3.7     106 107 109  109 106  106  106 107   CO2 20*  20* 18* 18*  18* 19*  19*  19* 15*   BUN 4*  4* 5* 5*  5* 6*  6*  6* 7*   CREATININE 1.2  1.1 1.8* 1.9*  1.9* 2.1*  2.1*  2.1* 2.5*   CALCIUM 7.4*  7.5* 7.8* 8.0*  8.0* 8.3*  8.3*  8.3* 8.4*   ALBUMIN 2.2*  2.2* 2.2* 2.2*  2.2* 2.3*  2.3*  2.3* 2.3*   PROT 5.3*  --   --  5.4*  --    BILITOT 2.9*  --   --  2.7*  --    ALKPHOS 57  --   --  60  --    ALT 12  --   --  17  --    AST 32  --   --  34  --    MG 2.3 2.0 2.0  2.0 2.0  2.0  --    PHOS 3.6  3.7 3.0 3.3  3.3 3.4  3.4  3.4 3.7       All pertinent labs within the past 24 hours have been reviewed.    Significant Imaging:  I have reviewed all pertinent imaging results/findings within the past 24 hours.

## 2024-12-09 NOTE — ASSESSMENT & PLAN NOTE
MELD 3.0: 32 at 11/29/2024  9:57 PM  MELD-Na: 31 at 11/29/2024  9:57 PM  Calculated from:  Serum Creatinine: 3 mg/dL at 11/29/2024  9:57 PM  Serum Sodium: 132 mmol/L at 11/29/2024  9:57 PM  Total Bilirubin: 2.9 mg/dL at 11/29/2024 10:34 AM  Serum Albumin: 2.8 g/dL at 11/29/2024  9:57 PM  INR(ratio): 2 at 11/27/2024  2:09 AM  Age at listing (hypothetical): 69 years  Sex: Male at 11/29/2024  9:57 PM      Hepatology following, pt not transplant eligible at this time.

## 2024-12-10 LAB
ABO + RH BLD: NORMAL
ALBUMIN SERPL BCP-MCNC: 2.2 G/DL (ref 3.5–5.2)
ALBUMIN SERPL BCP-MCNC: 2.2 G/DL (ref 3.5–5.2)
ALBUMIN SERPL BCP-MCNC: 2.3 G/DL (ref 3.5–5.2)
ALBUMIN SERPL BCP-MCNC: 2.4 G/DL (ref 3.5–5.2)
ALLENS TEST: ABNORMAL
ALLENS TEST: ABNORMAL
ALP SERPL-CCNC: 74 U/L (ref 40–150)
ALT SERPL W/O P-5'-P-CCNC: 17 U/L (ref 10–44)
ANION GAP SERPL CALC-SCNC: 10 MMOL/L (ref 8–16)
ANION GAP SERPL CALC-SCNC: 6 MMOL/L (ref 8–16)
ANION GAP SERPL CALC-SCNC: 6 MMOL/L (ref 8–16)
ANION GAP SERPL CALC-SCNC: 8 MMOL/L (ref 8–16)
AST SERPL-CCNC: 43 U/L (ref 10–40)
BASOPHILS # BLD AUTO: 0.06 K/UL (ref 0–0.2)
BASOPHILS NFR BLD: 0.8 % (ref 0–1.9)
BILIRUB SERPL-MCNC: 2.7 MG/DL (ref 0.1–1)
BLD GP AB SCN CELLS X3 SERPL QL: NORMAL
BLD PROD TYP BPU: NORMAL
BLOOD UNIT EXPIRATION DATE: NORMAL
BLOOD UNIT TYPE CODE: 6200
BLOOD UNIT TYPE: NORMAL
BUN SERPL-MCNC: 10 MG/DL (ref 8–23)
BUN SERPL-MCNC: 12 MG/DL (ref 8–23)
BUN SERPL-MCNC: 7 MG/DL (ref 8–23)
BUN SERPL-MCNC: 7 MG/DL (ref 8–23)
CALCIUM SERPL-MCNC: 7.5 MG/DL (ref 8.7–10.5)
CALCIUM SERPL-MCNC: 7.5 MG/DL (ref 8.7–10.5)
CALCIUM SERPL-MCNC: 8.3 MG/DL (ref 8.7–10.5)
CALCIUM SERPL-MCNC: 8.3 MG/DL (ref 8.7–10.5)
CHLORIDE SERPL-SCNC: 109 MMOL/L (ref 95–110)
CHLORIDE SERPL-SCNC: 109 MMOL/L (ref 95–110)
CHLORIDE SERPL-SCNC: 112 MMOL/L (ref 95–110)
CHLORIDE SERPL-SCNC: 112 MMOL/L (ref 95–110)
CO2 SERPL-SCNC: 12 MMOL/L (ref 23–29)
CO2 SERPL-SCNC: 13 MMOL/L (ref 23–29)
CO2 SERPL-SCNC: 18 MMOL/L (ref 23–29)
CO2 SERPL-SCNC: 18 MMOL/L (ref 23–29)
CODING SYSTEM: NORMAL
CREAT SERPL-MCNC: 1.7 MG/DL (ref 0.5–1.4)
CREAT SERPL-MCNC: 1.7 MG/DL (ref 0.5–1.4)
CREAT SERPL-MCNC: 2.8 MG/DL (ref 0.5–1.4)
CREAT SERPL-MCNC: 3 MG/DL (ref 0.5–1.4)
CROSSMATCH INTERPRETATION: NORMAL
DIFFERENTIAL METHOD BLD: ABNORMAL
DISPENSE STATUS: NORMAL
EOSINOPHIL # BLD AUTO: 0.1 K/UL (ref 0–0.5)
EOSINOPHIL NFR BLD: 1.2 % (ref 0–8)
ERYTHROCYTE [DISTWIDTH] IN BLOOD BY AUTOMATED COUNT: 25.9 % (ref 11.5–14.5)
EST. GFR  (NO RACE VARIABLE): 21.5 ML/MIN/1.73 M^2
EST. GFR  (NO RACE VARIABLE): 23.4 ML/MIN/1.73 M^2
EST. GFR  (NO RACE VARIABLE): 42.6 ML/MIN/1.73 M^2
EST. GFR  (NO RACE VARIABLE): 42.6 ML/MIN/1.73 M^2
GLUCOSE SERPL-MCNC: 97 MG/DL (ref 70–110)
GLUCOSE SERPL-MCNC: 98 MG/DL (ref 70–110)
GLUCOSE SERPL-MCNC: 99 MG/DL (ref 70–110)
GLUCOSE SERPL-MCNC: 99 MG/DL (ref 70–110)
HCO3 UR-SCNC: 16.4 MMOL/L (ref 24–28)
HCO3 UR-SCNC: 16.4 MMOL/L (ref 24–28)
HCT VFR BLD AUTO: 21.6 % (ref 40–54)
HGB BLD-MCNC: 6.5 G/DL (ref 14–18)
IMM GRANULOCYTES # BLD AUTO: 0.05 K/UL (ref 0–0.04)
IMM GRANULOCYTES NFR BLD AUTO: 0.6 % (ref 0–0.5)
LYMPHOCYTES # BLD AUTO: 0.7 K/UL (ref 1–4.8)
LYMPHOCYTES NFR BLD: 9 % (ref 18–48)
MAGNESIUM SERPL-MCNC: 1.8 MG/DL (ref 1.6–2.6)
MAGNESIUM SERPL-MCNC: 1.8 MG/DL (ref 1.6–2.6)
MAGNESIUM SERPL-MCNC: 2 MG/DL (ref 1.6–2.6)
MAGNESIUM SERPL-MCNC: 2 MG/DL (ref 1.6–2.6)
MCH RBC QN AUTO: 24.4 PG (ref 27–31)
MCHC RBC AUTO-ENTMCNC: 30.1 G/DL (ref 32–36)
MCV RBC AUTO: 81 FL (ref 82–98)
MONOCYTES # BLD AUTO: 0.8 K/UL (ref 0.3–1)
MONOCYTES NFR BLD: 10.7 % (ref 4–15)
NEUTROPHILS # BLD AUTO: 6.1 K/UL (ref 1.8–7.7)
NEUTROPHILS NFR BLD: 77.7 % (ref 38–73)
NRBC BLD-RTO: 0 /100 WBC
OHS QRS DURATION: 98 MS
OHS QTC CALCULATION: 436 MS
PCO2 BLDA: 37.2 MMHG (ref 35–45)
PCO2 BLDA: 37.2 MMHG (ref 35–45)
PH SMN: 7.25 [PH] (ref 7.35–7.45)
PH SMN: 7.25 [PH] (ref 7.35–7.45)
PHOSPHATE SERPL-MCNC: 2.5 MG/DL (ref 2.7–4.5)
PHOSPHATE SERPL-MCNC: 2.5 MG/DL (ref 2.7–4.5)
PHOSPHATE SERPL-MCNC: 4.5 MG/DL (ref 2.7–4.5)
PHOSPHATE SERPL-MCNC: 4.7 MG/DL (ref 2.7–4.5)
PLATELET # BLD AUTO: 40 K/UL (ref 150–450)
PMV BLD AUTO: ABNORMAL FL (ref 9.2–12.9)
PO2 BLDA: 33 MMHG (ref 40–60)
PO2 BLDA: 33 MMHG (ref 40–60)
POC BE: -11 MMOL/L
POC BE: -11 MMOL/L
POC SATURATED O2: 54 % (ref 95–100)
POC SATURATED O2: 54 % (ref 95–100)
POC TCO2: 18 MMOL/L (ref 24–29)
POC TCO2: 18 MMOL/L (ref 24–29)
POTASSIUM SERPL-SCNC: 3.6 MMOL/L (ref 3.5–5.1)
POTASSIUM SERPL-SCNC: 3.7 MMOL/L (ref 3.5–5.1)
PROT SERPL-MCNC: 5.4 G/DL (ref 6–8.4)
RBC # BLD AUTO: 2.66 M/UL (ref 4.6–6.2)
SAMPLE: ABNORMAL
SAMPLE: ABNORMAL
SITE: ABNORMAL
SITE: ABNORMAL
SODIUM SERPL-SCNC: 133 MMOL/L (ref 136–145)
SODIUM SERPL-SCNC: 134 MMOL/L (ref 136–145)
SPECIMEN OUTDATE: NORMAL
TRANS ERYTHROCYTES VOL PATIENT: NORMAL ML
WBC # BLD AUTO: 7.79 K/UL (ref 3.9–12.7)

## 2024-12-10 PROCEDURE — 80100008 HC CRRT DAILY MAINTENANCE

## 2024-12-10 PROCEDURE — 82803 BLOOD GASES ANY COMBINATION: CPT

## 2024-12-10 PROCEDURE — 90945 DIALYSIS ONE EVALUATION: CPT

## 2024-12-10 PROCEDURE — 99900035 HC TECH TIME PER 15 MIN (STAT)

## 2024-12-10 PROCEDURE — 25000003 PHARM REV CODE 250: Performed by: INTERNAL MEDICINE

## 2024-12-10 PROCEDURE — 84100 ASSAY OF PHOSPHORUS: CPT

## 2024-12-10 PROCEDURE — 93010 ELECTROCARDIOGRAM REPORT: CPT | Mod: ,,, | Performed by: INTERNAL MEDICINE

## 2024-12-10 PROCEDURE — 86920 COMPATIBILITY TEST SPIN: CPT

## 2024-12-10 PROCEDURE — 83735 ASSAY OF MAGNESIUM: CPT

## 2024-12-10 PROCEDURE — 85025 COMPLETE CBC W/AUTO DIFF WBC: CPT

## 2024-12-10 PROCEDURE — 25000003 PHARM REV CODE 250: Performed by: STUDENT IN AN ORGANIZED HEALTH CARE EDUCATION/TRAINING PROGRAM

## 2024-12-10 PROCEDURE — 93005 ELECTROCARDIOGRAM TRACING: CPT

## 2024-12-10 PROCEDURE — 99291 CRITICAL CARE FIRST HOUR: CPT | Mod: ,,, | Performed by: INTERNAL MEDICINE

## 2024-12-10 PROCEDURE — 25000003 PHARM REV CODE 250

## 2024-12-10 PROCEDURE — 86901 BLOOD TYPING SEROLOGIC RH(D): CPT

## 2024-12-10 PROCEDURE — 63600175 PHARM REV CODE 636 W HCPCS: Performed by: INTERNAL MEDICINE

## 2024-12-10 PROCEDURE — P9021 RED BLOOD CELLS UNIT: HCPCS

## 2024-12-10 PROCEDURE — 83735 ASSAY OF MAGNESIUM: CPT | Mod: 91 | Performed by: STUDENT IN AN ORGANIZED HEALTH CARE EDUCATION/TRAINING PROGRAM

## 2024-12-10 PROCEDURE — 20000000 HC ICU ROOM

## 2024-12-10 PROCEDURE — 63600175 PHARM REV CODE 636 W HCPCS

## 2024-12-10 PROCEDURE — 99233 SBSQ HOSP IP/OBS HIGH 50: CPT | Mod: ,,, | Performed by: INTERNAL MEDICINE

## 2024-12-10 PROCEDURE — 80053 COMPREHEN METABOLIC PANEL: CPT

## 2024-12-10 PROCEDURE — 80069 RENAL FUNCTION PANEL: CPT | Mod: 91 | Performed by: STUDENT IN AN ORGANIZED HEALTH CARE EDUCATION/TRAINING PROGRAM

## 2024-12-10 RX ORDER — MAGNESIUM SULFATE HEPTAHYDRATE 40 MG/ML
2 INJECTION, SOLUTION INTRAVENOUS
Status: DISPENSED | OUTPATIENT
Start: 2024-12-10 | End: 2024-12-11

## 2024-12-10 RX ORDER — HYDROCODONE BITARTRATE AND ACETAMINOPHEN 500; 5 MG/1; MG/1
TABLET ORAL CONTINUOUS
Status: ACTIVE | OUTPATIENT
Start: 2024-12-10 | End: 2024-12-11

## 2024-12-10 RX ORDER — HYDROCODONE BITARTRATE AND ACETAMINOPHEN 500; 5 MG/1; MG/1
TABLET ORAL
Status: DISCONTINUED | OUTPATIENT
Start: 2024-12-10 | End: 2024-12-13

## 2024-12-10 RX ORDER — ONDANSETRON HYDROCHLORIDE 2 MG/ML
4 INJECTION, SOLUTION INTRAVENOUS ONCE
Status: COMPLETED | OUTPATIENT
Start: 2024-12-10 | End: 2024-12-10

## 2024-12-10 RX ADMIN — PANTOPRAZOLE SODIUM 40 MG: 40 TABLET, DELAYED RELEASE ORAL at 09:12

## 2024-12-10 RX ADMIN — TAMSULOSIN HYDROCHLORIDE 0.4 MG: 0.4 CAPSULE ORAL at 09:12

## 2024-12-10 RX ADMIN — HYDROCORTISONE 10 MG: 5 TABLET ORAL at 02:12

## 2024-12-10 RX ADMIN — LACTULOSE 30 G: 20 SOLUTION ORAL at 02:12

## 2024-12-10 RX ADMIN — MIDODRINE HYDROCHLORIDE 20 MG: 5 TABLET ORAL at 02:12

## 2024-12-10 RX ADMIN — ONDANSETRON 4 MG: 2 INJECTION INTRAMUSCULAR; INTRAVENOUS at 10:12

## 2024-12-10 RX ADMIN — LACTULOSE 30 G: 20 SOLUTION ORAL at 09:12

## 2024-12-10 RX ADMIN — MIDODRINE HYDROCHLORIDE 20 MG: 5 TABLET ORAL at 09:12

## 2024-12-10 RX ADMIN — HYDROCORTISONE 15 MG: 5 TABLET ORAL at 05:12

## 2024-12-10 RX ADMIN — METOPROLOL TARTRATE 12.5 MG: 25 TABLET, FILM COATED ORAL at 09:12

## 2024-12-10 RX ADMIN — MIDODRINE HYDROCHLORIDE 20 MG: 5 TABLET ORAL at 05:12

## 2024-12-10 RX ADMIN — SODIUM CHLORIDE: 9 INJECTION, SOLUTION INTRAVENOUS at 06:12

## 2024-12-10 RX ADMIN — SODIUM CHLORIDE: 9 INJECTION, SOLUTION INTRAVENOUS at 12:12

## 2024-12-10 RX ADMIN — ONDANSETRON 4 MG: 2 INJECTION INTRAMUSCULAR; INTRAVENOUS at 12:12

## 2024-12-10 NOTE — ASSESSMENT & PLAN NOTE
Baseline creatinine is 1.5. May require dialysis long term.   Avoid nephrotoxins and renally dose meds for GFR listed above  Monitor urine output, serial BMP, and adjust therapy as needed  Pending nephrology recs regarding tunneled cath and transitioning to HD

## 2024-12-10 NOTE — ASSESSMENT & PLAN NOTE
MELD 3.0: 32 at 11/29/2024  9:57 PM  MELD-Na: 31 at 11/29/2024  9:57 PM  Calculated from:  Serum Creatinine: 3 mg/dL at 11/29/2024  9:57 PM  Serum Sodium: 132 mmol/L at 11/29/2024  9:57 PM  Total Bilirubin: 2.9 mg/dL at 11/29/2024 10:34 AM  Serum Albumin: 2.8 g/dL at 11/29/2024  9:57 PM  INR(ratio): 2 at 11/27/2024  2:09 AM  Age at listing (hypothetical): 69 years  Sex: Male at 11/29/2024  9:57 PM    Per hepatology, pt is not a transplant candidate at this time

## 2024-12-10 NOTE — PROGRESS NOTES
"[facility] - [facility]  Adult Nutrition  Progress Note    SUMMARY   Recommendations    1.) Continue Renal diet - per MD 12/4              -Nursing, continue documenting PO intake via flowsheets     2.) Continue Boost Breeze TID to assist in meeting nutrition needs (provides 750 kcals, 27 g PRO, 162 CHO)               - once diet advances, may change to Boost Plus TID.     3.) RD to monitor wt, PO intake, skin, labs.     Goals: to meet % of EEN/EPN by next RD f/u  Nutrition Goal Status: goal not met  Communication of RD Recs: in POC    Assessment and Plan    Nutrition Problem  Increased nutrient needs (protein)     Related to (etiology):   Increased demand for protein 2/2 acute renal failure     Signs and Symptoms (as evidenced by):   Need for CRRT      Interventions/Recommendations (treatment strategy):  Collaboration of nutritional care with other providers.   ONS     Nutrition Diagnosis Status:   Active    Reason for Assessment    Reason For Assessment: RD follow-up  Diagnosis: liver disease (Decompensated cirrhosis)  General Information Comments: RD f/u, unable to speak to pt today. Nausea/vomiting today, unable to participate w/ therapy. Remains on SCUF. 0% intake of lunch, 25% intake of breakfast today. 50% of 1 meal yesterday. Not meeting nutrition needs.  Nutrition Discharge Planning: Regular Diet, fluid per MD    Nutrition Related Social Determinants of Health: SDOH: None Identified    Nutrition Risk Screen    Nutrition Risk Screen: no indicators present    Nutrition/Diet History    Patient Reported Diet/Restrictions/Preferences: general  Typical Food/Fluid Intake: 2 meals/day + snacks  Spiritual, Cultural Beliefs, Bahai Practices, Values that Affect Care: no  Food Allergies: NKFA    Anthropometrics    Temp: 96.4 °F (35.8 °C)  Height Method: Stated  Height: 6' 1" (185.4 cm)  Height (inches): 73 in  Weight Method: Bed Scale  Weight: (!) 141 kg (310 lb 13.6 oz)  Weight (lb): (!) 310.85 lb  Ideal Body " Weight (IBW), Male: 184 lb  % Ideal Body Weight, Male (lb): 183.32 %  BMI (Calculated): 41       Lab/Procedures/Meds    Pertinent Labs Reviewed: reviewed  Pertinent Labs Comments: Na: 133, creatinine: 2.8, gfr: 23.4, phos: 4.7  Pertinent Medications Reviewed: reviewed  Pertinent Medications Comments: Lopressor, pantoprazole, tamsulosin      Estimated/Assessed Needs    Weight Used For Calorie Calculations: (!) 150.2 kg (331 lb 2.1 oz)  Energy Calorie Requirements (kcal): 2311 kcal  Energy Need Method: [place]-St Jeor  Protein Requirements: 167- 184 (2.0-2.2g/kg of IBW)  Weight Used For Protein Calculations: 83.5 kg (184 lb) (IBW d/t BMI >40.0)  Fluid Requirements (mL): as per MD     RDA Method (mL): 2311         Nutrition Prescription Ordered    Current Diet Order: Renal  Oral Nutrition Supplement: Boost Breeze TID    Evaluation of Received Nutrient/Fluid Intake    I/O: -8.4 L since admit  Energy Calories Required: not meeting needs  Protein Required: not meeting needs  Fluid Required:  (as per MD)  Comments: LBM 12/10  Tolerance: not tolerating (n/v today)  % Intake of Estimated Energy Needs: 0 - 25 %  % Meal Intake: 0 - 25 %    Nutrition Risk    Level of Risk/Frequency of Follow-up: low - moderate     Monitor and Evaluation    Food and Nutrient Intake: food and beverage intake  Food and Nutrient Adminstration: diet order  Knowledge/Beliefs/Attitudes: food and nutrition knowledge/skill, beliefs and attitudes  Physical Activity and Function: nutrition-related ADLs and IADLs  Anthropometric Measurements: weight, weight change, body mass index  Biochemical Data, Medical Tests and Procedures: electrolyte and renal panel, gastrointestinal profile, glucose/endocrine profile, lipid profile, inflammatory profile  Nutrition-Focused Physical Findings: overall appearance, skin     Nutrition Follow-Up    RD Follow-up?: Yes    Kenyetta Valdes RD, LDN

## 2024-12-10 NOTE — PROGRESS NOTES
12/10/24 0523 12/10/24 0539   Treatment   Treatment Type SCUF SCUF   Treatment Status Blood returned;Clotting Restart   Dialysis Machine Number  --  [id number]   Dialyzer Time (hours) 36.46 0   BVP (Liters) 0.2 L 0 L   Solutions Labeled and Current   --  Yes   Access  --  Temporary Cath;Right;IJ   Catheter Dressing Intact   --  Yes   Alarms Engaged  --  Yes   CRRT Comments TMP alarms; dialyzer starting to clot tx restarted

## 2024-12-10 NOTE — PROGRESS NOTES
CRRt /Sled restarted. /. Uf initial goal set at 300. Patient awake and alert. Pulse 97, o2 sat on room air 98% B/P 88/47

## 2024-12-10 NOTE — PROGRESS NOTES
12/10/24 0058   Treatment   Treatment Type SCUF   Treatment Status Daily equipment check   Dialysis Machine Number [id number]   Dialyzer Time (hours) 32.24   BVP (Liters) 0.2 L   Solutions Labeled and Current  Yes   Access Temporary Cath;Right;IJ   Catheter Dressing Intact  Yes   Alarms Engaged Yes   CRRT Comments daily check done

## 2024-12-10 NOTE — PT/OT/SLP PROGRESS
Occupational Therapy      Patient Name:  Juan Carlos BROOKS Fredo Julian.   MRN:  [id number]    Patient not seen today secondary to patient vomiting earlier this morning and then patient declined in pm due to not feeling well and declining mobility while patient on dialysis as well . Will follow-up for therapy.    12/10/2024

## 2024-12-10 NOTE — PLAN OF CARE
Recommendations    1.) Continue Renal diet - per MD 12/4              -Nursing, continue documenting PO intake via flowsheets     2.) Continue Boost Breeze TID to assist in meeting nutrition needs (provides 750 kcals, 27 g PRO, 162 CHO)               - once diet advances, may change to Boost Plus TID.     3.) RD to monitor wt, PO intake, skin, labs.     Goals: to meet % of EEN/EPN by next RD f/u  Nutrition Goal Status: goal not met  Communication of RD Recs: in POC

## 2024-12-10 NOTE — PROGRESS NOTES
[facility]  Critical Care Medicine  Progress Note    Patient Name: Juan Carlos Yoo Sr.  MRN: [id number]  Admission Date: 11/20/2024  Hospital Length of Stay: 20 days  Code Status: Full Code  Attending Provider: Mu Bird MD  Primary Care Provider: Nyla Rios FNP   Principal Problem: Decompensated cirrhosis    Subjective:     HPI:  Juan Carlos Yoo is a 69 male with PMH of alcoholic cirrhosis, esophageal varices, Afib on eliquis, and HTN who presents for c/o worsening diffuse swelling as well as R arm pain/erythema. He was recently hospitalized 1 month ago at [facility] for volume overload in the setting of decompensated cirrhosis. He was treated with IV diuresis and discharged with adjustment to his diuretics, currently on lasix 60mg BID and metolazone 2.5mg every other week as per family. Patient reports diffuse swelling of his upper and lower extremities in addition to distended abdomen and worsening dyspnea, which he believes is due to recent medication adjustment. Family states he is non-compliant with low sodium diet and fluid restriction. Family states he has been compliant with diuretics, but rom't taken eliquis for 3 days due to issue getting refill. He also reports scratching right arm on door frame 3-4 days ago which has become red and painful after wrapping in in bandage. He claims to be compliant with medications, but is a poor historian. He follows with hepatology, not currently active on transplant list. He states he hasn't consumed alcohol for at least 9 months. Has c/o chills, but denies fever, cough, nausea, vomiting, chest pain, dysuria, hematuria, blood in stool. Workup in ED remarkable for VS: T 97.6 HR 94 RR 22 BP 95/56 O2 sat 97% on RA. Hb 6.7, MCV 75, Plt 81, PT/INR 22.6/2.2, Na 128, K 6.1, BUN/Cr 49/5.7, Alb 2.8, AST/ALT 35/9, Ammonia 104, , Trop neg, LA 2.9, CXR: Cardiac size is enlarged similar to prior.  No large volume of pleural fluid noted although there is mild blunting  of the right costophrenic angle which may relate to a small amount of pleural fluid.  Suspected right basilar opacity, to be correlated clinically for infection. RUE venous doppler: No thrombus in central veins of the right upper extremity. Patient received vanc/zosyn and lasix 80mg IVP in ED and will be admitted to hospital medicine service for further management.     Pt was admitted to hospital medicine. Blood cultures positive for Gram-positive cocci and Gram-negative rods. ID has been consulted. S/p 2 units PRBC for Hemoglobin dropped 6.8 on 11/21. Pt was on Eliquis for atrial fibrillation prior to admission which was held.  Hepatology following patient's clinical course, but are not evaluating him for transplant at this time. Diuretics were held because of concern for HRS.  Patient was started on albumin and midodrine per Nephrology recommendations for HRS.  Renal function continued to worsen and recommendation per Nephrology to transfer to ICU for maintaining goal map over 85 on Levophed.  ICU was notified and patient to be assessed to be transferred to ICU.       Hospital/ICU Course:  69 y.o. male with history of EtOH use disorder, EtOH cirrhosis, HCC s/p y90, morbid obesity BMI 44, HTN, and Afib who presents with severe anasarca and JAE.      Appreciate hepatology and nephrology recommendations.  Diuretics were held because of concern for HRS.  Patient was started on albumin and midodrine per Nephrology recommendations for HRS.  Renal function continued to worsen and recommendation per Nephrology to transfer to ICU for maintaining goal map over 85 on Levophed.  ICU was notified and patient to be assessed to be transferred to ICU.     Patient also has Gram-positive cocci and Gram-negative rods in his blood now.  Possible sources could be got translocation and barrier related infection through skin as he has been significantly edematous and has been oozing.  Has been on vancomycin and Rocephin.  Id was  consulted this morning.  Repeat blood cultures were obtained.     Continues to have anemia.  Hemoglobin dropped on 11/20 and was given 1 unit of packed red blood cells.  Did not respond appropriately.  Hemoglobin dropped again to 6.8 on 11/21 and was given 1 unit of packed red blood cells.  Hemoglobin again on 11/22 is 7.2.  No reported melena or hematochezia.  Patient was on Eliquis for atrial fibrillation prior to admission which was held.  Given history of esophageal varices and portal hypertensive gastropathy whereas also could be component of slow bleeding, under production due to CKD and hemolysis while also with decompensated cirrhosis.  Started on Protonix IV b.i.d. and octreotide.     Also clarified with hepatology.  Given patient's current infection we will await ID recommendations however we will be challenging to consider transplant evaluation at this time.  Trend clinical course     Goal clarification with the patient and family.  Patient at this time wants to be full code and continue with Levophed in ICU.  They would consider discussion with palliative care in a day or so if things do not get better.     In MICU patient started on Levophed, however titration remained difficult given tachycardic response from the patient.     11/24 Trialysis and arterial lines placed overnight and currently on levo and norepi. SLED today.    11/25 Boo dc. Increased midodrine. Continue steroids until d/c pressors. Discussed with Nephrology about our concern for sustained use of pressors to achieve their MAP goals of 85. Will follow up tomorrow.     11/26 Platelets 48. Repeat 38 confirming thrombocytopenia. Concern for HIT. D/c heparin. Increased lactulose dosing. Bladder scan revealed urine in bladder. Boo placed. Off vaso. Continuing levo. Maintain MAP goals 80. PT/OT consulted.     11/27 MAP goal 75-80. Heparin antibody result pending.     11/28 Pt with abdominal pain, decreased bowel movements, emesis. Lactic  "acid 2.9 and repeat 2.7. Less concerned for mesenteric ischemia and more of a concern was for SBO. NG tube placed with subsequent suction of 500mL fluid. Abdomen less distended after placement and pt subsequently had bowel movement. MAP Goal of 60 with plan to come off pressors entirely and switch to dialysis after permacath, as he is not  liver transplant eligible at this time.     11/29 Pt with ileus. Clear liquids for now. Platelets 24. HIT versus zosyn induced? Peripheral smear sent. Zosyn changed to kaylah. Consent and transfuse 1U. GOC conversation today. Pt opting for long term dialysis.     11/30 naeon. Started back on heparin. One time dose of 120mg lasix today. Hold off SLED/SCUF today and give IV lasix 120 mg once; plan for SLED/SCUF tomorrow     12/1 Patient is becoming more dependent on dialysis. Not a current liver transplant candidate. Still complaining of abdominal pain after discontinuing the NGT. AXR with evidence of dilated bowel loops. Brown bomb administered. The days following administration of the brown bomb, patient had more frequent bowel movements and was able to pass flatulence. Constipation eventually resolved.   Given the need for pressor support, he was worked up for adrenal insufficiency which was positive. Consequently, he was started on steroids for adrenal insufficiency. His blood pressures improved, however, he still required pressor support, particularly during dialysis. Pressor support was eventually weaned off.       Interval History/Significant Events: NAEON. Patient reports feeling better. GOC discussion were started with son at bedside. Reiterated that he is not a liver transplant candidate at this time. Patient expressed that he would like to continue pursuing treatment options, he stated that if he "goes" he wants it to be "on my terms".     Review of Systems  Objective:     Vital Signs (Most Recent):  Temp: 97.2 °F (36.2 °C) (12/10/24 1101)  Pulse: 80 (12/10/24 1101)  Resp: " 12 (12/10/24 1101)  BP: (!) 105/47 (12/10/24 1101)  SpO2: 100 % (12/10/24 1101) Vital Signs (24h Range):  Temp:  [96.3 °F (35.7 °C)-98.3 °F (36.8 °C)] 97.2 °F (36.2 °C)  Pulse:  [76-87] 80  Resp:  [10-28] 12  SpO2:  [88 %-100 %] 100 %  BP: ()/(37-64) 105/47  Arterial Line BP: (87-98)/(37-42) 98/42   Weight: (!) 141 kg (310 lb 13.6 oz)  Body mass index is 41.01 kg/m².      Intake/Output Summary (Last 24 hours) at 12/10/2024 1245  Last data filed at 12/10/2024 1101  Gross per 24 hour   Intake 5121.07 ml   Output 6612 ml   Net -1490.93 ml          Physical Exam  Vitals and nursing note reviewed.   Constitutional:       General: He is awake. He is not in acute distress.     Appearance: He is obese. He is ill-appearing. He is not toxic-appearing.   HENT:      Head: Normocephalic and atraumatic.   Eyes:      Extraocular Movements: Extraocular movements intact.      Conjunctiva/sclera: Conjunctivae normal.   Cardiovascular:      Rate and Rhythm: Normal rate.   Pulmonary:      Effort: Pulmonary effort is normal. No respiratory distress.   Abdominal:      General: There is distension.      Palpations: Abdomen is soft.      Tenderness: There is no abdominal tenderness. There is no guarding or rebound.   Musculoskeletal:         General: No swelling or tenderness.      Right lower leg: Edema present.      Left lower leg: Edema present.      Comments: 2+ pitting edema in bilateral lower extremities   Skin:     General: Skin is warm.      Coloration: Skin is not jaundiced.      Findings: Bruising present.   Neurological:      Mental Status: He is alert and oriented to person, place, and time.      Motor: No weakness.            Vents:     Lines/Drains/Airways       Central Venous Catheter Line  Duration             Trialysis (Dialysis) Catheter 11/24/24 0441 right internal jugular 16 days              Drain  Duration             Male External Urinary Catheter 12/08/24 1532 1 day              Peripheral Intravenous Line   Duration                  Peripheral IV - Single Lumen 11/27/24 1101 20 G Anterior;Left Forearm 13 days                  Significant Labs:    CBC/Anemia Profile:  Recent Labs   Lab 12/09/24  0246 12/10/24  0355   WBC 8.39 7.79   HGB 7.2* 6.5*   HCT 22.8* 21.6*   PLT 37* 40*   MCV 79* 81*   RDW 25.2* 25.9*        Chemistries:  Recent Labs   Lab 12/09/24  0246 12/09/24  1507 12/09/24 2157 12/10/24  0355   *  132*  132*  132*  132*  134* 133* 133* 134*   K 3.8  3.7  3.7  3.7  3.7  3.8 3.7 3.7 3.7     107  107  107  107  109 110 111* 112*   CO2 16*  16*  16*  16*  16*  16* 13* 14* 12*   BUN 8  8  8  8  8  8 9 9 10   CREATININE 2.5*  2.5*  2.5*  2.5*  2.5*  2.5* 2.7* 2.8* 3.0*   CALCIUM 8.3*  8.4*  8.4*  8.4*  8.4*  8.3* 8.5* 8.3* 8.3*   ALBUMIN 2.3*  2.3*  2.3*  2.3*  2.3*  2.3* 2.4* 2.3* 2.3*   PROT 5.6*  --   --  5.4*   BILITOT 2.6*  --   --  2.7*   ALKPHOS 70  --   --  74   ALT 17  --   --  17   AST 44*  --   --  43*   MG 2.3  2.3  2.3  2.3  2.3  2.3 2.1 2.1 2.0   PHOS 4.1  4.2  4.2  4.2  4.2  4.1 4.2 4.4 4.5       All pertinent labs within the past 24 hours have been reviewed.    Significant Imaging:  I have reviewed all pertinent imaging results/findings within the past 24 hours.    ABG  Recent Labs   Lab 12/10/24  0718   PH 7.253*  7.253*   PO2 33*  33*   PCO2 37.2  37.2   HCO3 16.4*  16.4*   BE -11*  -11*     Assessment/Plan:     Neuro  Encephalopathy, metabolic  Resolved  AAOx4  Will assess for signs of encephalopathy     Cardiac/Vascular  Atrial fibrillation  Holding home Eliquis in the setting of recent low blood counts  Continue metoprolol 12.5 mg BID    Renal/  Stage 3a chronic kidney disease  Nephrology following  Will try to remove more fluid  Strict intake and output  Renally dose all medications  Avoid nephrotoxic agents  Discuss with nephrology placement of tunneled line for HD    JAE (acute kidney injury)  Baseline creatinine is 1.5.  May require dialysis long term.   Avoid nephrotoxins and renally dose meds for GFR listed above  Monitor urine output, serial BMP, and adjust therapy as needed  Pending nephrology recs regarding tunneled cath and transitioning to HD    ID  Gram-negative bacteremia  Blood cultures from admission with B. Diminuta, micrococcus luteus, lysinobacillus species. Seen by ID previously who recommended stopping antibiotics due to concern these were contaminants. Repeat blood cultures have been no growth. Has since been transferred to ICU with increased pressor requirement. Blood cultures repeated on 11/24 are no growth x 24 hours.   11/29 Switched from zosyn to meropenem due to concern of thrombocytopenia.   Finished meropenem course 12/8 @ 6 PM    Severe sepsis  This patient does have evidence of infective focus  My overall impression is sepsis.  Source: Abdominal    Organ dysfunction indicated by Acute kidney injury    Fluid challenge Contraindicated- Fluid bolus is contraindicated in this patient due to End Stage Liver Disease     Post- resuscitation assessment No - Post resuscitation assessment not needed     Source control achieved by: antibiotics  Patient finished course of abx (meropenem) 12/8    Hematology  Thrombocytopenia  Daily CBC  Transfuse for PLT<10k, or PLT<50K with active bleeding  Monitor for signs and symptoms of bleeding    Coagulopathy  End Stage Liver Disease precipitated coagulopathy  Heparin d/c d/t thrombocytopenia  CTM    Endocrine  Adrenal insufficiency  On going problem since admission, most likely multifactorial - component of liver dysfunction, HRS, sepsis on admission.  Continues to require levo, midodrine has been up titrated.   Random cortisol was 6   Persistent hypotension thought to be adrenal insufficiency.   Continue steroids      Hyponatremia  Likely dilutional secondary to end stage liver disease and HRS.   CTM, correct if appropriate    GI  * Decompensated cirrhosis  MELD 3.0: 32 at  11/29/2024  9:57 PM  MELD-Na: 31 at 11/29/2024  9:57 PM  Calculated from:  Serum Creatinine: 3 mg/dL at 11/29/2024  9:57 PM  Serum Sodium: 132 mmol/L at 11/29/2024  9:57 PM  Total Bilirubin: 2.9 mg/dL at 11/29/2024 10:34 AM  Serum Albumin: 2.8 g/dL at 11/29/2024  9:57 PM  INR(ratio): 2 at 11/27/2024  2:09 AM  Age at listing (hypothetical): 69 years  Sex: Male at 11/29/2024  9:57 PM    Per hepatology, pt is not a transplant candidate at this time      Abdominal pain  Patient developed abdominal pain 2/2 constipation last week and NGT was placed. 11/30 NGT was removed. The day after the NGT was removed he developed constipation and dilated bowel loops on AXR.  Some BM after brown bomb enema.   CTM  Lactulose TID  Last BM 12/8    Palliative Care  Palliative care encounter  Will coordinate GOC discussion    Goals of care, counseling/discussion  Advance Care Planning  Will plan to coordinate with hepatology and palliative.            Critical Care Daily Checklist:    A: Awake: RASS Goal/Actual Goal: (RETIRED) RASS Goal: 0-->alert and calm  Actual:     B: Spontaneous Breathing Trial Performed?     C: SAT & SBT Coordinated?                        D: Delirium: CAM-ICU (RETIRED) Overall CAM-ICU: Negative   E: Early Mobility Performed? Yes   F: Feeding Goal: Goals: to meet % of EEN/EPN by next RD f/u  Status: Nutrition Goal Status: goal not met   Current Diet Order   Procedures    Diet Renal Standard Tray     Order Specific Question:   Tray type:     Answer:   Standard Tray      AS: Analgesia/Sedation    T: Thromboembolic Prophylaxis    H: HOB > 300 Yes   U: Stress Ulcer Prophylaxis (if needed) Yes   G: Glucose Control    B: Bowel Function Stool Occurrence: 1   I: Indwelling Catheter (Lines & Boo) Necessity Trialysis   D: De-escalation of Antimicrobials/Pharmacotherapies     Plan for the day/ETD GOC    Code Status:  Family/Goals of Care: Full Code         Critical secondary to Patient has a condition that poses threat  to life and bodily function: Acute Renal Failure      Critical care was time spent personally by me on the following activities: development of treatment plan with patient or surrogate and bedside caregivers, discussions with consultants, evaluation of patient's response to treatment, examination of patient, ordering and performing treatments and interventions, ordering and review of laboratory studies, ordering and review of radiographic studies, pulse oximetry, re-evaluation of patient's condition. This critical care time did not overlap with that of any other provider or involve time for any procedures.     Rhona Lewis, DO  Critical Care Medicine  [facility] - Medical ICU

## 2024-12-10 NOTE — SUBJECTIVE & OBJECTIVE
"Interval History/Significant Events: NAEON. Patient reports feeling better. GOC discussion were started with son at bedside. Reiterated that he is not a liver transplant candidate at this time. Patient expressed that he would like to continue pursuing treatment options, he stated that if he "goes" he wants it to be "on my terms".     Review of Systems  Objective:     Vital Signs (Most Recent):  Temp: 97.2 °F (36.2 °C) (12/10/24 1101)  Pulse: 80 (12/10/24 1101)  Resp: 12 (12/10/24 1101)  BP: (!) 105/47 (12/10/24 1101)  SpO2: 100 % (12/10/24 1101) Vital Signs (24h Range):  Temp:  [96.3 °F (35.7 °C)-98.3 °F (36.8 °C)] 97.2 °F (36.2 °C)  Pulse:  [76-87] 80  Resp:  [10-28] 12  SpO2:  [88 %-100 %] 100 %  BP: ()/(37-64) 105/47  Arterial Line BP: (87-98)/(37-42) 98/42   Weight: (!) 141 kg (310 lb 13.6 oz)  Body mass index is 41.01 kg/m².      Intake/Output Summary (Last 24 hours) at 12/10/2024 1245  Last data filed at 12/10/2024 1101  Gross per 24 hour   Intake 5121.07 ml   Output 6612 ml   Net -1490.93 ml          Physical Exam  Vitals and nursing note reviewed.   Constitutional:       General: He is awake. He is not in acute distress.     Appearance: He is obese. He is ill-appearing. He is not toxic-appearing.   HENT:      Head: Normocephalic and atraumatic.   Eyes:      Extraocular Movements: Extraocular movements intact.      Conjunctiva/sclera: Conjunctivae normal.   Cardiovascular:      Rate and Rhythm: Normal rate.   Pulmonary:      Effort: Pulmonary effort is normal. No respiratory distress.   Abdominal:      General: There is distension.      Palpations: Abdomen is soft.      Tenderness: There is no abdominal tenderness. There is no guarding or rebound.   Musculoskeletal:         General: No swelling or tenderness.      Right lower leg: Edema present.      Left lower leg: Edema present.      Comments: 2+ pitting edema in bilateral lower extremities   Skin:     General: Skin is warm.      Coloration: Skin is " not jaundiced.      Findings: Bruising present.   Neurological:      Mental Status: He is alert and oriented to person, place, and time.      Motor: No weakness.            Vents:     Lines/Drains/Airways       Central Venous Catheter Line  Duration             Trialysis (Dialysis) Catheter 11/24/24 0441 right internal jugular 16 days              Drain  Duration             Male External Urinary Catheter 12/08/24 1532 1 day              Peripheral Intravenous Line  Duration                  Peripheral IV - Single Lumen 11/27/24 1101 20 G Anterior;Left Forearm 13 days                  Significant Labs:    CBC/Anemia Profile:  Recent Labs   Lab 12/09/24  0246 12/10/24  0355   WBC 8.39 7.79   HGB 7.2* 6.5*   HCT 22.8* 21.6*   PLT 37* 40*   MCV 79* 81*   RDW 25.2* 25.9*        Chemistries:  Recent Labs   Lab 12/09/24  0246 12/09/24  1507 12/09/24  2157 12/10/24  0355   *  132*  132*  132*  132*  134* 133* 133* 134*   K 3.8  3.7  3.7  3.7  3.7  3.8 3.7 3.7 3.7     107  107  107  107  109 110 111* 112*   CO2 16*  16*  16*  16*  16*  16* 13* 14* 12*   BUN 8  8  8  8  8  8 9 9 10   CREATININE 2.5*  2.5*  2.5*  2.5*  2.5*  2.5* 2.7* 2.8* 3.0*   CALCIUM 8.3*  8.4*  8.4*  8.4*  8.4*  8.3* 8.5* 8.3* 8.3*   ALBUMIN 2.3*  2.3*  2.3*  2.3*  2.3*  2.3* 2.4* 2.3* 2.3*   PROT 5.6*  --   --  5.4*   BILITOT 2.6*  --   --  2.7*   ALKPHOS 70  --   --  74   ALT 17  --   --  17   AST 44*  --   --  43*   MG 2.3  2.3  2.3  2.3  2.3  2.3 2.1 2.1 2.0   PHOS 4.1  4.2  4.2  4.2  4.2  4.1 4.2 4.4 4.5       All pertinent labs within the past 24 hours have been reviewed.    Significant Imaging:  I have reviewed all pertinent imaging results/findings within the past 24 hours.

## 2024-12-10 NOTE — PLAN OF CARE
"[facility] - Medical [facility]  Discharge Reassessment    Primary Care Provider: Nyla Rios FNP    Expected Discharge Date: 12/13/2024    Reassessment (most recent)       Discharge Reassessment - 12/10/24 1445          Discharge Reassessment    Assessment Type Discharge Planning Reassessment     Did the patient's condition or plan change since previous assessment? Yes     Communicated AUTUMN with patient/caregiver Yes     Discharge Plan A Skilled Nursing Facility     Discharge Plan B Comfort care/withdrawal     Transition of Care Barriers None     Why the patient remains in the hospital Requires continued medical care        Post-Acute Status    Discharge Delays None known at this time                   Patient is not medically ready for discharge.  Per Md;  Patient reports feeling better. GOC discussion were started with son at bedside. Reiterated that he is not a liver transplant candidate at this time. Patient expressed that he would like to continue pursuing treatment options, he stated that if he "goes" he wants it to be "on my terms". Discharge Plan A and Plan B have been determined by review of patient's clinical status, future medical and therapeutic needs, and coverage/benefits for post-acute care in coordination with multidisciplinary team members. Currently patient in off [facility].          Indira Reagan LMSW  - [facility]      "

## 2024-12-10 NOTE — PT/OT/SLP PROGRESS
Physical Therapy      Patient Name:  Juan Carlos BROOKS Fredo Julain.   MRN:  [id number]    Patient not seen today secondary to 1420 attempt pt refused session 2/2 nausea. Will follow-up at next scheduled visit.

## 2024-12-11 LAB
ACANTHOCYTES BLD QL SMEAR: PRESENT
ALBUMIN SERPL BCP-MCNC: 2 G/DL (ref 3.5–5.2)
ALP SERPL-CCNC: 67 U/L (ref 40–150)
ALT SERPL W/O P-5'-P-CCNC: 15 U/L (ref 10–44)
ANION GAP SERPL CALC-SCNC: 6 MMOL/L (ref 8–16)
ANION GAP SERPL CALC-SCNC: 7 MMOL/L (ref 8–16)
ANION GAP SERPL CALC-SCNC: 7 MMOL/L (ref 8–16)
ANISOCYTOSIS BLD QL SMEAR: ABNORMAL
ANISOCYTOSIS BLD QL SMEAR: SLIGHT
AST SERPL-CCNC: 30 U/L (ref 10–40)
BASOPHILS # BLD AUTO: 0.03 K/UL (ref 0–0.2)
BASOPHILS # BLD AUTO: 0.04 K/UL (ref 0–0.2)
BASOPHILS NFR BLD: 0.4 % (ref 0–1.9)
BASOPHILS NFR BLD: 0.5 % (ref 0–1.9)
BILIRUB SERPL-MCNC: 4.3 MG/DL (ref 0.1–1)
BLD PROD TYP BPU: NORMAL
BLOOD UNIT EXPIRATION DATE: NORMAL
BLOOD UNIT TYPE CODE: 6200
BLOOD UNIT TYPE: NORMAL
BUN SERPL-MCNC: 4 MG/DL (ref 8–23)
BUN SERPL-MCNC: 5 MG/DL (ref 8–23)
BUN SERPL-MCNC: 5 MG/DL (ref 8–23)
BURR CELLS BLD QL SMEAR: ABNORMAL
BURR CELLS BLD QL SMEAR: ABNORMAL
CALCIUM SERPL-MCNC: 7.1 MG/DL (ref 8.7–10.5)
CALCIUM SERPL-MCNC: 7.1 MG/DL (ref 8.7–10.5)
CALCIUM SERPL-MCNC: 7.2 MG/DL (ref 8.7–10.5)
CHLORIDE SERPL-SCNC: 107 MMOL/L (ref 95–110)
CHLORIDE SERPL-SCNC: 107 MMOL/L (ref 95–110)
CHLORIDE SERPL-SCNC: 108 MMOL/L (ref 95–110)
CO2 SERPL-SCNC: 19 MMOL/L (ref 23–29)
CO2 SERPL-SCNC: 19 MMOL/L (ref 23–29)
CO2 SERPL-SCNC: 20 MMOL/L (ref 23–29)
CODING SYSTEM: NORMAL
CREAT SERPL-MCNC: 1.2 MG/DL (ref 0.5–1.4)
CROSSMATCH INTERPRETATION: NORMAL
DIFFERENTIAL METHOD BLD: ABNORMAL
DIFFERENTIAL METHOD BLD: ABNORMAL
DISPENSE STATUS: NORMAL
E COLI SXT1 STL QL IA: NEGATIVE
E COLI SXT2 STL QL IA: NEGATIVE
EOSINOPHIL # BLD AUTO: 0 K/UL (ref 0–0.5)
EOSINOPHIL # BLD AUTO: 0.1 K/UL (ref 0–0.5)
EOSINOPHIL NFR BLD: 0.6 % (ref 0–8)
EOSINOPHIL NFR BLD: 0.6 % (ref 0–8)
ERYTHROCYTE [DISTWIDTH] IN BLOOD BY AUTOMATED COUNT: 24.6 % (ref 11.5–14.5)
ERYTHROCYTE [DISTWIDTH] IN BLOOD BY AUTOMATED COUNT: 25.1 % (ref 11.5–14.5)
EST. GFR  (NO RACE VARIABLE): >60 ML/MIN/1.73 M^2
GLUCOSE SERPL-MCNC: 106 MG/DL (ref 70–110)
GLUCOSE SERPL-MCNC: 107 MG/DL (ref 70–110)
GLUCOSE SERPL-MCNC: 107 MG/DL (ref 70–110)
HCT VFR BLD AUTO: 19.4 % (ref 40–54)
HCT VFR BLD AUTO: 22.6 % (ref 40–54)
HGB BLD-MCNC: 6 G/DL (ref 14–18)
HGB BLD-MCNC: 7.2 G/DL (ref 14–18)
HYPOCHROMIA BLD QL SMEAR: ABNORMAL
IMM GRANULOCYTES # BLD AUTO: 0.04 K/UL (ref 0–0.04)
IMM GRANULOCYTES # BLD AUTO: 0.08 K/UL (ref 0–0.04)
IMM GRANULOCYTES NFR BLD AUTO: 0.6 % (ref 0–0.5)
IMM GRANULOCYTES NFR BLD AUTO: 0.7 % (ref 0–0.5)
INR PPP: 2.4 (ref 0.8–1.2)
LYMPHOCYTES # BLD AUTO: 0.5 K/UL (ref 1–4.8)
LYMPHOCYTES # BLD AUTO: 0.6 K/UL (ref 1–4.8)
LYMPHOCYTES NFR BLD: 5.6 % (ref 18–48)
LYMPHOCYTES NFR BLD: 8.2 % (ref 18–48)
MAGNESIUM SERPL-MCNC: 1.9 MG/DL (ref 1.6–2.6)
MAGNESIUM SERPL-MCNC: 1.9 MG/DL (ref 1.6–2.6)
MCH RBC QN AUTO: 24.7 PG (ref 27–31)
MCH RBC QN AUTO: 25.4 PG (ref 27–31)
MCHC RBC AUTO-ENTMCNC: 30.9 G/DL (ref 32–36)
MCHC RBC AUTO-ENTMCNC: 31.9 G/DL (ref 32–36)
MCV RBC AUTO: 80 FL (ref 82–98)
MCV RBC AUTO: 80 FL (ref 82–98)
MONOCYTES # BLD AUTO: 0.7 K/UL (ref 0.3–1)
MONOCYTES # BLD AUTO: 0.8 K/UL (ref 0.3–1)
MONOCYTES NFR BLD: 11.2 % (ref 4–15)
MONOCYTES NFR BLD: 6.8 % (ref 4–15)
NEUTROPHILS # BLD AUTO: 4.9 K/UL (ref 1.8–7.7)
NEUTROPHILS # BLD AUTO: 9.4 K/UL (ref 1.8–7.7)
NEUTROPHILS NFR BLD: 78.9 % (ref 38–73)
NEUTROPHILS NFR BLD: 85.9 % (ref 38–73)
NRBC BLD-RTO: 0 /100 WBC
NRBC BLD-RTO: 0 /100 WBC
OVALOCYTES BLD QL SMEAR: ABNORMAL
PATH REV BLD -IMP: NORMAL
PHOSPHATE SERPL-MCNC: 2.7 MG/DL (ref 2.7–4.5)
PLATELET # BLD AUTO: 21 K/UL (ref 150–450)
PLATELET # BLD AUTO: 24 K/UL (ref 150–450)
PLATELET BLD QL SMEAR: ABNORMAL
PLATELET BLD QL SMEAR: ABNORMAL
PMV BLD AUTO: 11.1 FL (ref 9.2–12.9)
PMV BLD AUTO: ABNORMAL FL (ref 9.2–12.9)
POIKILOCYTOSIS BLD QL SMEAR: ABNORMAL
POIKILOCYTOSIS BLD QL SMEAR: ABNORMAL
POLYCHROMASIA BLD QL SMEAR: ABNORMAL
POTASSIUM SERPL-SCNC: 3.7 MMOL/L (ref 3.5–5.1)
PROT SERPL-MCNC: 4.8 G/DL (ref 6–8.4)
PROTHROMBIN TIME: 25.5 SEC (ref 9–12.5)
RBC # BLD AUTO: 2.43 M/UL (ref 4.6–6.2)
RBC # BLD AUTO: 2.83 M/UL (ref 4.6–6.2)
SCHISTOCYTES BLD QL SMEAR: ABNORMAL
SCHISTOCYTES BLD QL SMEAR: PRESENT
SODIUM SERPL-SCNC: 133 MMOL/L (ref 136–145)
SODIUM SERPL-SCNC: 133 MMOL/L (ref 136–145)
SODIUM SERPL-SCNC: 134 MMOL/L (ref 136–145)
SPHEROCYTES BLD QL SMEAR: ABNORMAL
TARGETS BLD QL SMEAR: ABNORMAL
TRANS ERYTHROCYTES VOL PATIENT: NORMAL ML
WBC # BLD AUTO: 10.97 K/UL (ref 3.9–12.7)
WBC # BLD AUTO: 6.23 K/UL (ref 3.9–12.7)

## 2024-12-11 PROCEDURE — 63600175 PHARM REV CODE 636 W HCPCS: Performed by: STUDENT IN AN ORGANIZED HEALTH CARE EDUCATION/TRAINING PROGRAM

## 2024-12-11 PROCEDURE — 97530 THERAPEUTIC ACTIVITIES: CPT

## 2024-12-11 PROCEDURE — 85060 BLOOD SMEAR INTERPRETATION: CPT | Mod: ,,, | Performed by: PATHOLOGY

## 2024-12-11 PROCEDURE — 25000003 PHARM REV CODE 250

## 2024-12-11 PROCEDURE — 80100008 HC CRRT DAILY MAINTENANCE

## 2024-12-11 PROCEDURE — 25000003 PHARM REV CODE 250: Performed by: INTERNAL MEDICINE

## 2024-12-11 PROCEDURE — 85610 PROTHROMBIN TIME: CPT | Performed by: STUDENT IN AN ORGANIZED HEALTH CARE EDUCATION/TRAINING PROGRAM

## 2024-12-11 PROCEDURE — 83735 ASSAY OF MAGNESIUM: CPT | Performed by: STUDENT IN AN ORGANIZED HEALTH CARE EDUCATION/TRAINING PROGRAM

## 2024-12-11 PROCEDURE — 86920 COMPATIBILITY TEST SPIN: CPT

## 2024-12-11 PROCEDURE — 99291 CRITICAL CARE FIRST HOUR: CPT | Mod: ,,, | Performed by: INTERNAL MEDICINE

## 2024-12-11 PROCEDURE — P9021 RED BLOOD CELLS UNIT: HCPCS

## 2024-12-11 PROCEDURE — 85025 COMPLETE CBC W/AUTO DIFF WBC: CPT | Mod: 91 | Performed by: INTERNAL MEDICINE

## 2024-12-11 PROCEDURE — 85025 COMPLETE CBC W/AUTO DIFF WBC: CPT

## 2024-12-11 PROCEDURE — 80069 RENAL FUNCTION PANEL: CPT | Performed by: STUDENT IN AN ORGANIZED HEALTH CARE EDUCATION/TRAINING PROGRAM

## 2024-12-11 PROCEDURE — 20000000 HC ICU ROOM

## 2024-12-11 PROCEDURE — 94761 N-INVAS EAR/PLS OXIMETRY MLT: CPT

## 2024-12-11 PROCEDURE — 63600175 PHARM REV CODE 636 W HCPCS

## 2024-12-11 PROCEDURE — 99233 SBSQ HOSP IP/OBS HIGH 50: CPT | Mod: ,,, | Performed by: INTERNAL MEDICINE

## 2024-12-11 PROCEDURE — 97112 NEUROMUSCULAR REEDUCATION: CPT

## 2024-12-11 PROCEDURE — 84100 ASSAY OF PHOSPHORUS: CPT

## 2024-12-11 PROCEDURE — 27000923 HC TRIALYSIS CATHETER, ANY SIZE

## 2024-12-11 PROCEDURE — 90945 DIALYSIS ONE EVALUATION: CPT

## 2024-12-11 PROCEDURE — 80053 COMPREHEN METABOLIC PANEL: CPT

## 2024-12-11 PROCEDURE — 97535 SELF CARE MNGMENT TRAINING: CPT

## 2024-12-11 RX ORDER — HEPARIN SODIUM 1000 [USP'U]/ML
1000 INJECTION, SOLUTION INTRAVENOUS; SUBCUTANEOUS ONCE
Status: CANCELLED | OUTPATIENT
Start: 2024-12-11 | End: 2024-12-11

## 2024-12-11 RX ORDER — HEPARIN SODIUM 5000 [USP'U]/ML
5000 INJECTION, SOLUTION INTRAVENOUS; SUBCUTANEOUS
Status: CANCELLED | OUTPATIENT
Start: 2024-12-12 | End: 2024-12-13

## 2024-12-11 RX ORDER — SODIUM CHLORIDE 9 MG/ML
INJECTION, SOLUTION INTRAVENOUS ONCE
Status: CANCELLED | OUTPATIENT
Start: 2024-12-12 | End: 2024-12-12

## 2024-12-11 RX ORDER — SODIUM CHLORIDE 9 MG/ML
INJECTION, SOLUTION INTRAVENOUS
Status: CANCELLED | OUTPATIENT
Start: 2024-12-11

## 2024-12-11 RX ORDER — FAMOTIDINE 20 MG/1
20 TABLET, FILM COATED ORAL DAILY
Status: DISCONTINUED | OUTPATIENT
Start: 2024-12-12 | End: 2024-12-11

## 2024-12-11 RX ORDER — HYDROCODONE BITARTRATE AND ACETAMINOPHEN 500; 5 MG/1; MG/1
TABLET ORAL
Status: DISCONTINUED | OUTPATIENT
Start: 2024-12-11 | End: 2024-12-13

## 2024-12-11 RX ORDER — FAMOTIDINE 20 MG/1
20 TABLET, FILM COATED ORAL EVERY OTHER DAY
Status: DISCONTINUED | OUTPATIENT
Start: 2024-12-12 | End: 2024-12-19

## 2024-12-11 RX ADMIN — MIDODRINE HYDROCHLORIDE 20 MG: 5 TABLET ORAL at 09:12

## 2024-12-11 RX ADMIN — LACTULOSE 30 G: 20 SOLUTION ORAL at 03:12

## 2024-12-11 RX ADMIN — METOPROLOL TARTRATE 12.5 MG: 25 TABLET, FILM COATED ORAL at 09:12

## 2024-12-11 RX ADMIN — TAMSULOSIN HYDROCHLORIDE 0.4 MG: 0.4 CAPSULE ORAL at 09:12

## 2024-12-11 RX ADMIN — HYDROCORTISONE 10 MG: 5 TABLET ORAL at 03:12

## 2024-12-11 RX ADMIN — HYDROCORTISONE 15 MG: 5 TABLET ORAL at 05:12

## 2024-12-11 RX ADMIN — SODIUM CHLORIDE, POTASSIUM CHLORIDE, SODIUM LACTATE AND CALCIUM CHLORIDE 500 ML: 600; 310; 30; 20 INJECTION, SOLUTION INTRAVENOUS at 03:12

## 2024-12-11 RX ADMIN — PANTOPRAZOLE SODIUM 40 MG: 40 TABLET, DELAYED RELEASE ORAL at 09:12

## 2024-12-11 RX ADMIN — MAGNESIUM SULFATE HEPTAHYDRATE 2 G: 40 INJECTION, SOLUTION INTRAVENOUS at 01:12

## 2024-12-11 RX ADMIN — SODIUM CHLORIDE, POTASSIUM CHLORIDE, SODIUM LACTATE AND CALCIUM CHLORIDE 500 ML: 600; 310; 30; 20 INJECTION, SOLUTION INTRAVENOUS at 01:12

## 2024-12-11 RX ADMIN — LACTULOSE 30 G: 20 SOLUTION ORAL at 09:12

## 2024-12-11 RX ADMIN — SODIUM PHOSPHATE, MONOBASIC, MONOHYDRATE AND SODIUM PHOSPHATE, DIBASIC, ANHYDROUS 30 MMOL: 142; 276 INJECTION, SOLUTION INTRAVENOUS at 02:12

## 2024-12-11 RX ADMIN — MIDODRINE HYDROCHLORIDE 20 MG: 5 TABLET ORAL at 03:12

## 2024-12-11 RX ADMIN — MIDODRINE HYDROCHLORIDE 20 MG: 5 TABLET ORAL at 05:12

## 2024-12-11 NOTE — PT/OT/SLP PROGRESS
"Occupational Therapy   Treatment    Co-treatment with PT to have 2 skilled therapists present to ensure pt's safety during mobility and to accommodate his activity tolerance    Name: Juan Carlos Yoo Sr.  MRN: [id number]  Admitting Diagnosis:  Decompensated cirrhosis       Recommendations:     Discharge Recommendations: Moderate Intensity Therapy  Discharge Equipment Recommendations:  wheelchair, walker, rolling, bath bench, bedside commode, grab bar, hospital bed  Barriers to discharge:  Decreased caregiver support (increased (A) with ADLs and mobility)    Assessment:     Juan Carlos Yoo Sr. is a 69 y.o. male with a medical diagnosis of Decompensated cirrhosis.  Pt tolerated session well and without incident, but he continues to require (A) with ADLs and mobility and requires encouragement for progressive mobility.  He presents with the following. Performance deficits affecting function are impaired balance, weakness, impaired endurance, impaired self care skills, impaired functional mobility, gait instability, impaired cardiopulmonary response to activity, edema.     Rehab Prognosis:  Good; patient would benefit from acute skilled OT services to address these deficits and reach maximum level of function.       Plan:     Patient to be seen 4 x/week to address the above listed problems via self-care/home management, therapeutic activities, therapeutic exercises  Plan of Care Expires: 12/27/24  Plan of Care Reviewed with: patient    Subjective     Chief Complaint: initial R knee pain, testicular discomfort when returned to supine   Patient/Family Comments/goals: "I'm not sitting in the chair."  Pain/Comfort:  Pain Rating 1: other (see comments) (pt mentioned R knee pain prior to activity, but he did not rate it  and didn't mention it during remainder of the session)    Objective:     Communicated with: nurse and PT prior to session.  Patient found HOB elevated with telemetry, pressure relief boots, peripheral IV, Trialysis, " Kellie, blood pressure cuff, pulse ox (continuous) upon OT entry to room.    General Precautions: Standard, fall    Orthopedic Precautions:N/A  Braces: N/A  Respiratory Status: Room air     Occupational Performance:     Bed Mobility:    Patient completed Supine to Sit to the L with moderate assistance and 2 persons for LE and trunk management  Patient completed Sit to Supine with maximal assistance and 2 persons for LE and trunk management    Functional Mobility/Transfers:  Patient completed Sit <> Stand Transfer from EOB x 1 trial with contact guard assistance  with  no assistive device   Functional Mobility: Pt took ~6 lateral steps to the L toward HOB with CGA with no AD.  He refused to sit in the bedside chair despite maximal encouragement and education from therapists.  Pt reported no dizziness.      Activities of Daily Living:  Feeding: stand by assistance with tray table setup to eat some of his lunch while seated EOB.  He was able to use BUE to cut his protein.    Grooming: Setup assistance to sanitize his hands while supine with HOB elevated  Lower Body Dressing: total assistance to don non-skid sock while supine      AMPAC 6 Click ADL: 14    Treatment & Education:  - Pt's BP measured 87/48 with MAP of 65 while HOB elevated prior to mobility (MAP goal of > 55, per nurse).  His BP read 115/42 with MAP of 60 while seated EOB; pt reported no dizziness.      Pt edu on role of OT, POC, safety when performing self care tasks, benefit of performing OOB activity, and safety when performing functional transfers and mobility.  - Self care tasks completed-- as noted above      Patient left HOB elevated with all lines intact, call button in reach, and nurse notified    GOALS:   Multidisciplinary Problems       Occupational Therapy Goals          Problem: Occupational Therapy    Goal Priority Disciplines Outcome Interventions   Occupational Therapy Goal     OT, PT/OT Progressing    Description: Goals to be met by:  12/25/24     Patient will increase functional independence with ADLs by performing:    UE Dressing with Set-up Assistance.  LE Dressing with Stand-by Assistance.  Grooming while standing at sink with Stand-by Assistance.  Toileting from toilet with Stand-by Assistance for hygiene and clothing management.   Step transfer: with SBA and use of LRAD  Supine to sit with SBA  Toilet transfer to toilet with SBA and use of LRAD.  Independent with HEP to BUE to improve ROM                         Time Tracking:     OT Date of Treatment: 12/11/24  OT Start Time: 1436  OT Stop Time: 1501  OT Total Time (min): 25 min    Billable Minutes:Self Care/Home Management 12 min  Therapeutic Activity 13 min    OT/EDWARDO: OT          12/11/2024

## 2024-12-11 NOTE — PT/OT/SLP PROGRESS
Physical Therapy   Progress Note    Patient Name:  Juan Carlos Yoo Sr.  MRN: [id number]    Admit Date: 11/20/2024  Admitting Diagnosis:  Decompensated cirrhosis  Length of Stay: 21 days  Recent Surgery: * No surgery found *      Recommendations:     Discharge Recommendations: moderate intensity therapy  Equipment recommendations: wheelchair;walker, rolling;bedside commode;bath bench;hospital bed;grab bar;   Barriers to discharge: Increased level of skilled assistance required    Assessment:     Juan Carlos Yoo Sr. is a 69 y.o. male admitted to [facility] on 11/20/2024 with medical diagnosis of Decompensated cirrhosis. Pt presents with weakness, impaired endurance, impaired functional mobility, impaired balance, impaired cardiopulmonary response to activity. Pt is progressing towards goals, but has not yet reached prior level of function.     Pt agreeable to therapy session. Requires 2 person assist with bed mobility but able to maintain seated balance without assist once at eob. Pt requested to eat some of his lunch while upright. After that, trialed stand (no assist required) and tolerated 6 lateral steps well. Offered to transfer to recliner and pt declined despite education provided re: importance of OOB mobility. Encouraged pt to transfer to recliner later this evening with nsg. Will continue to assess mobility as tolerated. All VSS.    Juan Carlos Yoo Sr. would benefit from continued acute PT intervention to improve quality of life, focus on recovery of impairments, provide patient/caregiver education, reduce fall risk, and maximize (I) and safety with functional mobility. Once medically stable, recommending pt discharge to moderate intensity therapy. Patient continues to demonstrate the need for moderate intensity therapy on a daily basis post acute exhibited by decreased independence with functional mobility .      Rehab Prognosis: Good    Plan:     During this hospitalization, patient to be seen 4 x/week to address the  "identified rehab impairments via gait training, therapeutic activities, therapeutic exercises, neuromuscular re-education and progress towards stated goals.     Plan of Care Expires:  12/27/24  Plan of Care reviewed with: patient    This plan of care has been discussed with the patient/caregiver, who was included in its development and is in agreement with the identified goals and treatment plan.     Subjective     Communicated with RN prior to session.  Patient found HOB elevated upon PT entry to room, agreeable to therapy session. Pt alone during session.    Patient/Family Comments/goals: "I am not getting to the chair today"    Pain/Comfort:  Pain Rating 1: 0/10  Pain Rating Post-Intervention 1: 0/10    Patients cultural, spiritual, Methodist conflicts given the current situation: None identified     Objective:   OT present for cotreat due to pt's multiple medical comorbidities and functional/cognition deficits requiring two skilled therapists to appropriately progress pt's musculoskeletal strength, neuromuscular control, and endurance while taking into consideration medical acuity and pt safety.    Patient found with: telemetry, Kellie, SCD    General Precautions: Standard, fall   Orthopedic Precautions:N/A   Braces:     Oxygen Device: room air    Cognition:  Pt is Alert during session.    Therapist provided skilled verbal and tactile cueing to facilitate the following functional mobility tasks. Listed tasks are focused on recovery of impairments and improving pt's independence and efficiency with bed mobility, transfers and ambulation as able.     Bed Mobility:  Supine > Sit: Moderate Assistancex2  Sit > Supine: Maximum Assistancex2    Transfers:   Sit <> Stand Transfer: Contact Guard Assistance from eob with no AD   Bed <> Chair: pt refused                 Gait:  Distance: 6 lateral steps L  Assistance level: Contact Guard Assistance  Assistive Device: none  Gait Assessment: decreased step length , " decreased osmar, and decreased gait speed    Balance:  Dynamic Sitting: GOOD: Maintains balance through MODERATE excursions of active trunk movement  Standing:  Static: FAIR+: Takes MINIMAL challenges from all directions   Dynamic: FAIR: Needs CONTACT GUARD during gait    Outcome Measure: AM-PAC 6 CLICK MOBILITY  Total Score:15     Patient/Caregiver Education and Additional Therapeutic Activities/Exercises       Provided pt/caregiver education regarding:   PT POC and goals for therapy   Safety with mobility and fall risk   Safe management of AD as needed   Energy conservation techniques   Instruction on use of call button and importance of calling nursing staff for assistance with mobility     Patient/caregiver able to verbalize understanding; will follow-up with pt/caregiver during current admit for additional questions/concerns within scope of practice.     White board updated.     Patient left HOB elevated with all lines intact, call button in reach, and nsg notified.    Goals:     Multidisciplinary Problems       Physical Therapy Goals          Problem: Physical Therapy    Goal Priority Disciplines Outcome Interventions   Physical Therapy Goal     PT, PT/OT Progressing    Description: Goals to be met by: 24     Patient will increase functional independence with mobility by performin. Supine to sit with Minimal Assistance  2. Sit to stand transfer with Stand By Assistance  3. Bed to chair transfer with Stand By Assistance using LRAD  4. Gait  x 150 feet with Stand By Assistance using No LRAD.   5. Pt sit on EOB x 15 min with SBA and perform functional activities to increased functional mobility.                          Time Tracking:       PT Received On: 24  PT Start Time: 1435     PT Stop Time: 1501  PT Total Time (min): 26 min     Billable Minutes: Therapeutic Activity 13 and Neuromuscular Re-education 13    2024

## 2024-12-11 NOTE — ASSESSMENT & PLAN NOTE
JAE is likely due to pre-renal azotemia due to intravascular volume depletion secondary to decompensated hepatic cirrhosis with volume overload and acute tubular necrosis caused by hemodynamic instability, renal hypoperfusion, severe sepsis with GNR bacteremia. Initial presentation concerning for hepatorenal syndrome, transferred to MICU for vasopressors without success in reaching MAP goal 85-90 for treatment of HRS. Currently, patient with oligouric JAE more contributed by ATN as above.     Recommendations:    -Plan to switch to sled, we will continue monitoring his acidemia, maintain ultrafiltration 350-400 mL/hr for removal of uremic toxins and volume control from  -Still of pressors.  We will continue monitoring for the coming 72 hours,  - given hypotension and failure to maintain map above 65, we could resume patient on pressors and provide session of intermittent hemodialysis if tolerated (challenging situation)  - Map ranges from (low 50s to mid 60s) not on pressors  -  Recommend goals of care discussion regarding current prognosis and liver transplant candidacy   -Avoid nephrotoxins and renally dose meds for GFR listed above  -Monitor urine output, serial BMP, and adjust therapy as needed  -Hemodialysis consent obtained & placed in patient chart

## 2024-12-11 NOTE — PROGRESS NOTES
12/11/24 0429   Treatment   Treatment Type SLED   Treatment Status Discontinued treatment   Dialysis Machine Number [id number]   Dialyzer Time (hours) 12   BVP (Liters) 137.6 L   Solutions Labeled and Current  Yes   Access Temporary Cath   Catheter Dressing Intact  Yes   Alarms Engaged Yes   CRRT Comments sled tx completed   CRRT Hourly Documentation   Blood Flow (mL/min) 200   UF Rate 200 cc/hr   Arterial Pressure (mmHg) 10 mmHg   Venous Pressure (mmHg) 40 mmHg   Effluent Pressure (EP) (mmHg) 20 mmHg   Total UF (Hourly Cleared) (mL) 9     12 hr sled completed, reinfused with NS, deaccessed, saline locked, report given to primary rn

## 2024-12-11 NOTE — ASSESSMENT & PLAN NOTE
JAE is likely due to pre-renal azotemia due to intravascular volume depletion secondary to decompensated hepatic cirrhosis with volume overload and acute tubular necrosis caused by hemodynamic instability, renal hypoperfusion, severe sepsis with GNR bacteremia. Initial presentation concerning for hepatorenal syndrome, transferred to MICU for vasopressors without success in reaching MAP goal 85-90 for treatment of HRS. Currently, patient with oligouric JAE more contributed by ATN as above.     Recommendations:    - acidemia has been corrected, CO2 trended up from (12-20), plan is to provide a trial of intermittent hemodialysis tomorrow  - patient should be premedicated with midodrine 20 mg prior to dialysis on top of his current midodrine regimen, intra dialytic hypotension prevention, high calcium bath, cool dialysate, with ultrafiltration mottling, discussed with critical care team  - Map ranges from (low 50s to mid 60s) not on pressors  -  Recommend goals of care discussion regarding current prognosis and liver transplant candidacy   -Avoid nephrotoxins and renally dose meds for GFR listed above  -Monitor urine output, serial BMP, and adjust therapy as needed  -Hemodialysis consent obtained & placed in patient chart

## 2024-12-11 NOTE — SUBJECTIVE & OBJECTIVE
Interval History:   Status post 12 hours of sled, no clotting issues, CO2 improved from (12-20), blood pressure 92/48, map 60, urine output 150 mL, 1 occurrence was not measured    Review of patient's allergies indicates:  No Known Allergies  Current Facility-Administered Medications   Medication Frequency    0.9%  NaCl infusion (CRRT USE ONLY) Continuous    0.9%  NaCl infusion (for blood administration) Q24H PRN    0.9%  NaCl infusion (for blood administration) Q24H PRN    albuterol-ipratropium 2.5 mg-0.5 mg/3 mL nebulizer solution 3 mL Q6H PRN    aluminum-magnesium hydroxide-simethicone 200-200-20 mg/5 mL suspension 30 mL QID PRN    dextrose 10% bolus 125 mL 125 mL PRN    dextrose 10% bolus 250 mL 250 mL PRN    [START ON 12/12/2024] famotidine tablet 20 mg Every other day    glucagon (human recombinant) injection 1 mg PRN    glucose chewable tablet 16 g PRN    glucose chewable tablet 24 g PRN    hydrocortisone tablet 15 mg Before breakfast    And    hydrocortisone tablet 10 mg Daily    lactulose 20 gram/30 mL solution Soln 30 g TID    melatonin tablet 6 mg Nightly PRN    metoprolol tartrate (LOPRESSOR) split tablet 12.5 mg BID    midodrine tablet 20 mg Q8H    naloxone 0.4 mg/mL injection 0.02 mg PRN    ondansetron injection 4 mg Q8H PRN    simethicone chewable tablet 80 mg QID PRN    sodium chloride 0.9% flush 10 mL PRN    tamsulosin 24 hr capsule 0.4 mg QHS       Objective:     Vital Signs (Most Recent):  Temp: 97.5 °F (36.4 °C) (12/11/24 1501)  Pulse: 88 (12/11/24 1501)  Resp: 16 (12/11/24 1501)  BP: (!) 86/52 (12/11/24 1501)  SpO2: 97 % (12/11/24 1501) Vital Signs (24h Range):  Temp:  [97 °F (36.1 °C)-98 °F (36.7 °C)] 97.5 °F (36.4 °C)  Pulse:  [70-88] 88  Resp:  [8-36] 16  SpO2:  [95 %-100 %] 97 %  BP: ()/(35-58) 86/52     Weight: (!) 141 kg (310 lb 13.6 oz) (12/07/24 0501)  Body mass index is 41.01 kg/m².  Body surface area is 2.69 meters squared.    I/O last 3 completed shifts:  In: 4813.6 [P.O.:200;  I.V.:4613.6]  Out: 8741 [Urine:150; Emesis/NG output:350; Other:8241]     Physical Exam  Constitutional:       General: He is not in acute distress.     Appearance: He is ill-appearing. He is not toxic-appearing.   HENT:      Head: Normocephalic and atraumatic.   Eyes:      Extraocular Movements: Extraocular movements intact.      Pupils: Pupils are equal, round, and reactive to light.   Cardiovascular:      Rate and Rhythm: Normal rate.   Pulmonary:      Effort: No respiratory distress.      Breath sounds: No wheezing.   Abdominal:      General: There is distension.      Tenderness: There is no abdominal tenderness. There is no guarding.   Musculoskeletal:      Right lower leg: Edema present.      Left lower leg: Edema present.   Skin:     Coloration: Skin is pale. Skin is not jaundiced.   Neurological:      Mental Status: He is oriented to person, place, and time.   Psychiatric:         Behavior: Behavior normal.          Significant Labs:  CBC:   Recent Labs   Lab 12/11/24  1044   WBC 10.97   RBC 2.83*   HGB 7.2*   HCT 22.6*   PLT 24*   MCV 80*   MCH 25.4*   MCHC 31.9*     CMP:   Recent Labs   Lab 12/11/24  0333     107  107   CALCIUM 7.2*  7.1*  7.1*   ALBUMIN 2.0*  2.0*  2.0*   PROT 4.8*   *  133*  133*   K 3.7  3.7  3.7   CO2 20*  19*  19*     107  107   BUN 4*  5*  5*   CREATININE 1.2  1.2  1.2   ALKPHOS 67   ALT 15   AST 30   BILITOT 4.3*     LFTs:   Recent Labs   Lab 12/11/24  0333   ALT 15   AST 30   ALKPHOS 67   BILITOT 4.3*   PROT 4.8*   ALBUMIN 2.0*  2.0*  2.0*

## 2024-12-11 NOTE — PROGRESS NOTES
[facility]  Nephrology  Progress Note    Patient Name: Juan Carlos Yoo Sr.  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 Recent admission 10/4 at [facility] for decompensated hepatic cirrhosis where he was discharged with oral diuretic regimen including furosemide 60 mg BID and metolazone 2.5 mg daily, low salt diet with fluid restriction. It is unclear if patient is adherent to these medications or lifestyle modifications. W/u notable for anemia with hb 6.7 s/p 1 unit pRBC 11/20 and JAE on CKD w elevated BUN 49/creatinine 5.9, hyperkalemia (K 6.1>5.1 after shifting), bicarb 18, elevated lactate up to 3.5. Nephrology consulted for JAE with c/o HRS    Interval History:     On continuous scuf, ultrafiltration 350-400, clotted around 530 in the morning, restarted around 730 in the morning, map in 50s, urine output 150 mL, we will switch to sled today    Review of patient's allergies indicates:  No Known Allergies  Current Facility-Administered Medications   Medication Frequency    0.9%  NaCl infusion (CRRT USE ONLY) Continuous    0.9%  NaCl infusion (CRRT USE ONLY) Continuous    0.9%  NaCl infusion (for blood administration) Q24H PRN    albuterol-ipratropium 2.5 mg-0.5 mg/3 mL nebulizer solution 3 mL Q6H PRN    aluminum-magnesium hydroxide-simethicone 200-200-20 mg/5 mL suspension 30 mL QID PRN    dextrose 10% bolus 125 mL 125 mL PRN    dextrose 10% bolus 250 mL 250 mL PRN    glucagon (human recombinant) injection 1  mg PRN    glucose chewable tablet 16 g PRN    glucose chewable tablet 24 g PRN    hydrocortisone tablet 15 mg Before breakfast    And    hydrocortisone tablet 10 mg Daily    lactulose 20 gram/30 mL solution Soln 30 g TID    magnesium sulfate 2g in water 50mL IVPB (premix) PRN    melatonin tablet 6 mg Nightly PRN    metoprolol tartrate (LOPRESSOR) split tablet 12.5 mg BID    midodrine tablet 20 mg Q8H    naloxone 0.4 mg/mL injection 0.02 mg PRN    ondansetron injection 4 mg Q8H PRN    pantoprazole EC tablet 40 mg Daily    simethicone chewable tablet 80 mg QID PRN    sodium chloride 0.9% flush 10 mL PRN    tamsulosin 24 hr capsule 0.4 mg QHS       Objective:     Vital Signs (Most Recent):  Temp: 96.4 °F (35.8 °C) (12/10/24 1501)  Pulse: 81 (12/10/24 1701)  Resp: 15 (12/10/24 1701)  BP: (!) 77/42 (12/10/24 1701)  SpO2: 100 % (12/10/24 1701) Vital Signs (24h Range):  Temp:  [96.3 °F (35.7 °C)-98.3 °F (36.8 °C)] 96.4 °F (35.8 °C)  Pulse:  [76-87] 81  Resp:  [10-28] 15  SpO2:  [88 %-100 %] 100 %  BP: ()/(37-64) 77/42     Weight: (!) 141 kg (310 lb 13.6 oz) (12/07/24 0501)  Body mass index is 41.01 kg/m².  Body surface area is 2.69 meters squared.    I/O last 3 completed shifts:  In: 8665 [P.O.:690; I.V.:5013]  Out: 52768 [Other:53579]     Physical Exam  Constitutional:       General: He is not in acute distress.     Appearance: He is ill-appearing. He is not toxic-appearing.   HENT:      Head: Normocephalic and atraumatic.   Eyes:      Extraocular Movements: Extraocular movements intact.      Pupils: Pupils are equal, round, and reactive to light.   Cardiovascular:      Rate and Rhythm: Normal rate.   Pulmonary:      Effort: No respiratory distress.      Breath sounds: No wheezing or rales.   Abdominal:      General: There is distension.      Tenderness: There is no abdominal tenderness. There is no guarding.   Musculoskeletal:      Right lower leg: Edema present.      Left lower leg: Edema present.   Neurological:       Mental Status: He is oriented to person, place, and time.   Psychiatric:         Behavior: Behavior normal.          Significant Labs:  CBC:   Recent Labs   Lab 12/10/24  0355   WBC 7.79   RBC 2.66*   HGB 6.5*   HCT 21.6*   PLT 40*   MCV 81*   MCH 24.4*   MCHC 30.1*     CMP:   Recent Labs   Lab 12/10/24  0355 12/10/24  1435   GLU 98 97   CALCIUM 8.3* 8.3*   ALBUMIN 2.3* 2.4*   PROT 5.4*  --    * 133*   K 3.7 3.6   CO2 12* 13*   * 112*   BUN 10 12   CREATININE 3.0* 2.8*   ALKPHOS 74  --    ALT 17  --    AST 43*  --    BILITOT 2.7*  --      LFTs:   Recent Labs   Lab 12/10/24  0355 12/10/24  1435   ALT 17  --    AST 43*  --    ALKPHOS 74  --    BILITOT 2.7*  --    PROT 5.4*  --    ALBUMIN 2.3* 2.4*       Assessment/Plan:     Renal/  JAE (acute kidney injury)  JAE is likely due to pre-renal azotemia due to intravascular volume depletion secondary to decompensated hepatic cirrhosis with volume overload and acute tubular necrosis caused by hemodynamic instability, renal hypoperfusion, severe sepsis with GNR bacteremia. Initial presentation concerning for hepatorenal syndrome, transferred to MICU for vasopressors without success in reaching MAP goal 85-90 for treatment of HRS. Currently, patient with oligouric JAE more contributed by ATN as above.     Recommendations:    -Plan to switch to sled, we will continue monitoring his acidemia, maintain ultrafiltration 350-400 mL/hr for removal of uremic toxins and volume control from  -Still of pressors.  We will continue monitoring for the coming 72 hours,  - given hypotension and failure to maintain map above 65, we could resume patient on pressors and provide session of intermittent hemodialysis if tolerated (challenging situation)  - Map ranges from (low 50s to mid 60s) not on pressors  -  Recommend goals of care discussion regarding current prognosis and liver transplant candidacy   -Avoid nephrotoxins and renally dose meds for GFR listed above  -Monitor  urine output, serial BMP, and adjust therapy as needed  -Hemodialysis consent obtained & placed in patient chart        Thank you for your consult. I will follow-up with patient. Please contact us if you have any additional questions.    Irma Hernandez MD  Nephrology  [facility] - Medical ICU    ATTENDING PHYSICIAN ATTESTATION  I have personally verified the history and examined the patient. I thoroughly reviewed the demographic, clinical, laboratorial and imaging information available in medical records. I agree with the assessment and recommendations provided by the subspecialty resident who was under my supervision.

## 2024-12-11 NOTE — PLAN OF CARE
MICU DAILY GOALS     Family/Goals of care/Code Status   Code Status: Full Code    24H Vital Sign Range  Temp:  [97 °F (36.1 °C)-98 °F (36.7 °C)]   Pulse:  [70-88]   Resp:  [8-36]   BP: ()/(35-58)   SpO2:  [95 %-100 %]      Shift Events (include procedures and significant events)   No acute events throughout shift; plan for HD tomorrow    AWAKE RASS: Goal - (RETIRED) RASS Goal: 0-->alert and calm  Actual - RASS (Pedraza Agitation-Sedation Scale): alert and calm    Restraint necessity: Not necessary   BREATHE SBT: Not intubated    Coordinate A & B, analgesics/sedatives Pain: managed   SAT: Not intubated   Delirium CAM-ICU: (RETIRED) Overall CAM-ICU: Negative   Early(intubated/ Progressive (non-intubated) Mobility MOVE Screen (INTUBATED ONLY): Not intubated    Activity: Activity Management: Rolling - L1   Feeding/Nutrition Diet order: Diet/Nutrition Received: low saturated fat/low cholesterol, Specialty Diet/Nutrition Received: renal diet   Thrombus DVT prophylaxis: VTE Core Measure: (SCDs) Sequential compression device initiated/maintained   HOB Elevation Head of Bed (HOB) Positioning: HOB elevated   Ulcer Prophylaxis GI: yes   Glucose control managed Glycemic Management: blood glucose monitored   Skin Skin assessment:     Sacrum intact/not altered? Yes  Heels intact/not altered? Yes  Surgical wound? No    CHECK ONE!   (no altered skin or altered skin) and sub boxes:  [] No Altered Skin Integrity Present    []Prevention Measures Documented    [] Altered Skin Integrity Present or Discovered   [] LDA present in EPIC, daily doc completed              [] LDA added if not in EPIC (describe wound).                    When describing wound, do not stage, use descriptive words only.    [] Wound Image Taken (required on admit,                   transfer/discharge and every Tuesday)    Wound Care Consulted? No   Bowel Function no issues    Indwelling Catheter Necessity [REMOVED]      Urethral Catheter 11/26/24  1738-Reason for Continuing Urinary Catheterization: Urinary retention  [REMOVED]      Urethral Catheter 12/03/24 1607 Silicone 16 Fr.-Reason for Continuing Urinary Catheterization: Critically ill in ICU and requiring hourly monitoring of intake/output  [REMOVED]      Urethral Catheter 11/20/24 1802 Double-lumen-Reason for Continuing Urinary Catheterization: Urinary retention    Trialysis (Dialysis) Catheter 11/24/24 0441 right internal jugular-Line Necessity Review: CRRT/HD     De-escalation Antibiotics No        VS and assessment per flow sheet, patient progressing towards goals as tolerated, plan of care reviewed with family, all concerns addressed, will continue to monitor.    Problem: Adult Inpatient Plan of Care  Goal: Plan of Care Review  Outcome: Progressing  Goal: Patient-Specific Goal (Individualized)  Outcome: Progressing  Goal: Absence of Hospital-Acquired Illness or Injury  Outcome: Progressing  Goal: Optimal Comfort and Wellbeing  Outcome: Progressing  Goal: Readiness for Transition of Care  Outcome: Progressing     Problem: Infection  Goal: Absence of Infection Signs and Symptoms  Outcome: Progressing     Problem: Sepsis/Septic Shock  Goal: Optimal Coping  Outcome: Progressing  Goal: Absence of Bleeding  Outcome: Progressing  Goal: Blood Glucose Level Within Targeted Range  Outcome: Progressing  Goal: Absence of Infection Signs and Symptoms  Outcome: Progressing  Goal: Optimal Nutrition Intake  Outcome: Progressing     Problem: Acute Kidney Injury/Impairment  Goal: Fluid and Electrolyte Balance  Outcome: Progressing  Goal: Improved Oral Intake  Outcome: Progressing  Goal: Effective Renal Function  Outcome: Progressing     Problem: Wound  Goal: Optimal Coping  Outcome: Progressing  Goal: Optimal Functional Ability  Outcome: Progressing  Goal: Absence of Infection Signs and Symptoms  Outcome: Progressing  Goal: Improved Oral Intake  Outcome: Progressing  Goal: Optimal Pain Control and Function  Outcome:  Progressing  Goal: Skin Health and Integrity  Outcome: Progressing  Goal: Optimal Wound Healing  Outcome: Progressing     Problem: Bariatric Environmental Safety  Goal: Safety Maintained with Care  Outcome: Progressing     Problem: Skin Injury Risk Increased  Goal: Skin Health and Integrity  Outcome: Progressing     Problem: CRRT (Continuous Renal Replacement Therapy)  Goal: Safe, Effective Therapy Delivery  Outcome: Progressing  Goal: Hemodynamic Stability  Outcome: Progressing  Goal: Body Temperature Maintained in Desired Range  Outcome: Progressing  Goal: Absence of Infection Signs and Symptoms  Outcome: Progressing     Problem: Fall Injury Risk  Goal: Absence of Fall and Fall-Related Injury  Outcome: Progressing     Problem: Coping Ineffective  Goal: Effective Coping  Outcome: Progressing     Problem: Hemodialysis  Goal: Safe, Effective Therapy Delivery  Outcome: Progressing  Goal: Effective Tissue Perfusion  Outcome: Progressing  Goal: Absence of Infection Signs and Symptoms  Outcome: Progressing

## 2024-12-11 NOTE — ASSESSMENT & PLAN NOTE
Ongoing issue, could be related to ESLD or medication induced.   Daily CBC  Transfuse for PLT<10k, or PLT<50K with active bleeding  Monitor for signs and symptoms of bleeding  Peripheral blood smear to evaluate thrombocytopenia

## 2024-12-11 NOTE — PROGRESS NOTES
[facility] - [facility]  Nephrology  Progress Note    Patient Name: Juan Carlos Yoo Sr.  MRN: [id number]  Admission Date: 11/20/2024  Hospital Length of Stay: 21 days  Attending Provider: Mu Bird MD   Primary Care Physician: Nyla Rios FNP  Principal Problem:Decompensated cirrhosis    Subjective:     HPI: Juan Carlos Yoo is a 69 year old male with hx of decompensated hepatic cirrhosis due to alcohol use, HCC s/p Y90, esophageal varices, persistent afib on eliquis, HTN, CKD3a (baseline creatinine 1.2-1.4) admitted on 11/20 for sepsis likely 2/2 SSTI involving RUE and decompensated hepatic cirrhosis with signs of volume overload. Recent admission 10/4 at [facility] for decompensated hepatic cirrhosis where he was discharged with oral diuretic regimen including furosemide 60 mg BID and metolazone 2.5 mg daily, low salt diet with fluid restriction. It is unclear if patient is adherent to these medications or lifestyle modifications. W/u notable for anemia with hb 6.7 s/p 1 unit pRBC 11/20 and JAE on CKD w elevated BUN 49/creatinine 5.9, hyperkalemia (K 6.1>5.1 after shifting), bicarb 18, elevated lactate up to 3.5. Nephrology consulted for JAE with c/o HRS    Interval History:   Status post 12 hours of sled, no clotting issues, CO2 improved from (12-20), blood pressure 92/48, map 60, urine output 150 mL, 1 occurrence was not measured    Review of patient's allergies indicates:  No Known Allergies  Current Facility-Administered Medications   Medication Frequency    0.9%  NaCl infusion (CRRT USE ONLY) Continuous    0.9%  NaCl infusion (for blood administration) Q24H PRN    0.9%  NaCl infusion (for blood administration) Q24H PRN    albuterol-ipratropium 2.5 mg-0.5 mg/3 mL nebulizer solution 3 mL Q6H PRN    aluminum-magnesium hydroxide-simethicone 200-200-20 mg/5 mL suspension 30 mL QID PRN    dextrose 10% bolus 125 mL 125 mL PRN    dextrose 10% bolus 250 mL 250 mL PRN    [START ON 12/12/2024] famotidine tablet 20 mg Every  other day    glucagon (human recombinant) injection 1 mg PRN    glucose chewable tablet 16 g PRN    glucose chewable tablet 24 g PRN    hydrocortisone tablet 15 mg Before breakfast    And    hydrocortisone tablet 10 mg Daily    lactulose 20 gram/30 mL solution Soln 30 g TID    melatonin tablet 6 mg Nightly PRN    metoprolol tartrate (LOPRESSOR) split tablet 12.5 mg BID    midodrine tablet 20 mg Q8H    naloxone 0.4 mg/mL injection 0.02 mg PRN    ondansetron injection 4 mg Q8H PRN    simethicone chewable tablet 80 mg QID PRN    sodium chloride 0.9% flush 10 mL PRN    tamsulosin 24 hr capsule 0.4 mg QHS       Objective:     Vital Signs (Most Recent):  Temp: 97.5 °F (36.4 °C) (12/11/24 1501)  Pulse: 88 (12/11/24 1501)  Resp: 16 (12/11/24 1501)  BP: (!) 86/52 (12/11/24 1501)  SpO2: 97 % (12/11/24 1501) Vital Signs (24h Range):  Temp:  [97 °F (36.1 °C)-98 °F (36.7 °C)] 97.5 °F (36.4 °C)  Pulse:  [70-88] 88  Resp:  [8-36] 16  SpO2:  [95 %-100 %] 97 %  BP: ()/(35-58) 86/52     Weight: (!) 141 kg (310 lb 13.6 oz) (12/07/24 0501)  Body mass index is 41.01 kg/m².  Body surface area is 2.69 meters squared.    I/O last 3 completed shifts:  In: 4813.6 [P.O.:200; I.V.:4613.6]  Out: 8741 [Urine:150; Emesis/NG output:350; Other:8241]     Physical Exam  Constitutional:       General: He is not in acute distress.     Appearance: He is ill-appearing. He is not toxic-appearing.   HENT:      Head: Normocephalic and atraumatic.   Eyes:      Extraocular Movements: Extraocular movements intact.      Pupils: Pupils are equal, round, and reactive to light.   Cardiovascular:      Rate and Rhythm: Normal rate.   Pulmonary:      Effort: No respiratory distress.      Breath sounds: No wheezing.   Abdominal:      General: There is distension.      Tenderness: There is no abdominal tenderness. There is no guarding.   Musculoskeletal:      Right lower leg: Edema present.      Left lower leg: Edema present.   Skin:     Coloration: Skin is pale.  Skin is not jaundiced.   Neurological:      Mental Status: He is oriented to person, place, and time.   Psychiatric:         Behavior: Behavior normal.          Significant Labs:  CBC:   Recent Labs   Lab 12/11/24  1044   WBC 10.97   RBC 2.83*   HGB 7.2*   HCT 22.6*   PLT 24*   MCV 80*   MCH 25.4*   MCHC 31.9*     CMP:   Recent Labs   Lab 12/11/24  0333     107  107   CALCIUM 7.2*  7.1*  7.1*   ALBUMIN 2.0*  2.0*  2.0*   PROT 4.8*   *  133*  133*   K 3.7  3.7  3.7   CO2 20*  19*  19*     107  107   BUN 4*  5*  5*   CREATININE 1.2  1.2  1.2   ALKPHOS 67   ALT 15   AST 30   BILITOT 4.3*     LFTs:   Recent Labs   Lab 12/11/24  0333   ALT 15   AST 30   ALKPHOS 67   BILITOT 4.3*   PROT 4.8*   ALBUMIN 2.0*  2.0*  2.0*       Assessment/Plan:     Renal/  JAE (acute kidney injury)  JAE is likely due to pre-renal azotemia due to intravascular volume depletion secondary to decompensated hepatic cirrhosis with volume overload and acute tubular necrosis caused by hemodynamic instability, renal hypoperfusion, severe sepsis with GNR bacteremia. Initial presentation concerning for hepatorenal syndrome, transferred to MICU for vasopressors without success in reaching MAP goal 85-90 for treatment of HRS. Currently, patient with oligouric AJE more contributed by ATN as above.     Recommendations:    - Acidemia has been corrected, CO2 trended up from (12-20), plan is to provide a trial of intermittent hemodialysis tomorrow  - patient should be premedicated with midodrine 20 mg prior to dialysis on top of his current midodrine regimen, intra dialytic hypotension prevention, high calcium bath, cool dialysate, with ultrafiltration modeling, discussed with dialysis nurse and with critical care team  - Map ranges from (low 50s to mid 60s) not on pressors  -  Recommend goals of care discussion regarding current prognosis and liver transplant candidacy   -Avoid nephrotoxins and renally dose meds for  GFR listed above  -Monitor urine output, serial BMP, and adjust therapy as needed  -Hemodialysis consent obtained & placed in patient chart        Thank you for your consult. I will follow-up with patient. Please contact us if you have any additional questions.    Irma Hernandez MD  Nephrology  [facility]    ATTENDING PHYSICIAN ATTESTATION  I have personally verified the history and examined the patient. I thoroughly reviewed the demographic, clinical, laboratorial and imaging information available in medical records. I agree with the assessment and recommendations provided by the subspecialty resident who was under my supervision.

## 2024-12-11 NOTE — PROGRESS NOTES
12/11/24 0124   Treatment   Treatment Type SLED   Treatment Status Daily equipment check   Dialysis Machine Number [id number]   Dialyzer Time (hours) 8.27   BVP (Liters) 103.6 L   Solutions Labeled and Current  Yes   Access Temporary Cath   Catheter Dressing Intact  Yes   Alarms Engaged Yes   CRRT Comments daily check done   CRRT Hourly Documentation   Blood Flow (mL/min) 200   UF Rate 200 cc/hr   Arterial Pressure (mmHg) 50 mmHg   Venous Pressure (mmHg) 40 mmHg   Effluent Pressure (EP) (mmHg) 20 mmHg   Total UF (Hourly Cleared) (mL) 186     Received report from primary rn, daily equipment check done, lines secured, decreased uf rate d/t decreased bp and map

## 2024-12-11 NOTE — SUBJECTIVE & OBJECTIVE
Interval History/Significant Events: Patient was on SLED overnight, had episodes of hypotension, given 500mL x2 boluses of LR with some improvement in BP. Hgb also dropped and was given a unit of blood. Reports improvement in N/V from yesterday.    Denies HA, fevers, chills, chest pain, palpitations, SOB, abdominal pain, N/V.    Review of Systems  Objective:     Vital Signs (Most Recent):  Temp: 97.9 °F (36.6 °C) (12/11/24 0701)  Pulse: 74 (12/11/24 0900)  Resp: 16 (12/11/24 0900)  BP: (!) 91/52 (12/11/24 0900)  SpO2: 95 % (12/11/24 0900) Vital Signs (24h Range):  Temp:  [96.4 °F (35.8 °C)-97.9 °F (36.6 °C)] 97.9 °F (36.6 °C)  Pulse:  [70-82] 74  Resp:  [8-22] 16  SpO2:  [95 %-100 %] 95 %  BP: ()/(35-58) 91/52   Weight: (!) 141 kg (310 lb 13.6 oz)  Body mass index is 41.01 kg/m².      Intake/Output Summary (Last 24 hours) at 12/11/2024 1029  Last data filed at 12/11/2024 0801  Gross per 24 hour   Intake 3573.31 ml   Output 3886 ml   Net -312.69 ml          Physical Exam  Vitals and nursing note reviewed.   Constitutional:       General: He is awake. He is not in acute distress.     Appearance: He is obese. He is ill-appearing. He is not toxic-appearing.   HENT:      Head: Normocephalic and atraumatic.   Eyes:      Extraocular Movements: Extraocular movements intact.      Conjunctiva/sclera: Conjunctivae normal.   Cardiovascular:      Rate and Rhythm: Normal rate.   Pulmonary:      Effort: Pulmonary effort is normal. No respiratory distress.   Abdominal:      General: There is distension.      Palpations: Abdomen is soft.      Tenderness: There is no abdominal tenderness. There is no guarding or rebound.   Musculoskeletal:         General: No swelling or tenderness.      Right lower leg: Edema present.      Left lower leg: Edema present.      Comments: 2+ pitting edema in bilateral lower extremities   Skin:     General: Skin is warm.      Coloration: Skin is not jaundiced.      Findings: Bruising present.    Neurological:      Mental Status: He is alert and oriented to person, place, and time.      Motor: No weakness.            Vents:     Lines/Drains/Airways       Central Venous Catheter Line  Duration             Trialysis (Dialysis) Catheter 11/24/24 0441 right internal jugular 17 days              Drain  Duration             Male External Urinary Catheter 12/08/24 1532 2 days              Peripheral Intravenous Line  Duration                  Peripheral IV - Single Lumen 11/27/24 1101 20 G Anterior;Left Forearm 13 days                  Significant Labs:    CBC/Anemia Profile:  Recent Labs   Lab 12/10/24  0355 12/11/24  0333   WBC 7.79 6.23   HGB 6.5* 6.0*   HCT 21.6* 19.4*   PLT 40* 21*   MCV 81* 80*   RDW 25.9* 25.1*        Chemistries:  Recent Labs   Lab 12/10/24  0355 12/10/24  1435 12/10/24  2117 12/11/24  0333   * 133* 133*  133* 134*  133*  133*   K 3.7 3.6 3.7  3.7 3.7  3.7  3.7   * 112* 109  109 108  107  107   CO2 12* 13* 18*  18* 20*  19*  19*   BUN 10 12 7*  7* 4*  5*  5*   CREATININE 3.0* 2.8* 1.7*  1.7* 1.2  1.2  1.2   CALCIUM 8.3* 8.3* 7.5*  7.5* 7.2*  7.1*  7.1*   ALBUMIN 2.3* 2.4* 2.2*  2.2* 2.0*  2.0*  2.0*   PROT 5.4*  --   --  4.8*   BILITOT 2.7*  --   --  4.3*   ALKPHOS 74  --   --  67   ALT 17  --   --  15   AST 43*  --   --  30   MG 2.0 2.0 1.8  1.8 1.9  1.9   PHOS 4.5 4.7* 2.5*  2.5* 2.7  2.7  2.7       All pertinent labs within the past 24 hours have been reviewed.    Significant Imaging:  I have reviewed all pertinent imaging results/findings within the past 24 hours.

## 2024-12-11 NOTE — SUBJECTIVE & OBJECTIVE
Interval History:     On continuous scuf, ultrafiltration 350-400, clotted around 530 in the morning, restarted around 730 in the morning, map in 50s, urine output 150 mL, we will switch to sled today    Review of patient's allergies indicates:  No Known Allergies  Current Facility-Administered Medications   Medication Frequency    0.9%  NaCl infusion (CRRT USE ONLY) Continuous    0.9%  NaCl infusion (CRRT USE ONLY) Continuous    0.9%  NaCl infusion (for blood administration) Q24H PRN    albuterol-ipratropium 2.5 mg-0.5 mg/3 mL nebulizer solution 3 mL Q6H PRN    aluminum-magnesium hydroxide-simethicone 200-200-20 mg/5 mL suspension 30 mL QID PRN    dextrose 10% bolus 125 mL 125 mL PRN    dextrose 10% bolus 250 mL 250 mL PRN    glucagon (human recombinant) injection 1 mg PRN    glucose chewable tablet 16 g PRN    glucose chewable tablet 24 g PRN    hydrocortisone tablet 15 mg Before breakfast    And    hydrocortisone tablet 10 mg Daily    lactulose 20 gram/30 mL solution Soln 30 g TID    magnesium sulfate 2g in water 50mL IVPB (premix) PRN    melatonin tablet 6 mg Nightly PRN    metoprolol tartrate (LOPRESSOR) split tablet 12.5 mg BID    midodrine tablet 20 mg Q8H    naloxone 0.4 mg/mL injection 0.02 mg PRN    ondansetron injection 4 mg Q8H PRN    pantoprazole EC tablet 40 mg Daily    simethicone chewable tablet 80 mg QID PRN    sodium chloride 0.9% flush 10 mL PRN    tamsulosin 24 hr capsule 0.4 mg QHS       Objective:     Vital Signs (Most Recent):  Temp: 96.4 °F (35.8 °C) (12/10/24 1501)  Pulse: 81 (12/10/24 1701)  Resp: 15 (12/10/24 1701)  BP: (!) 77/42 (12/10/24 1701)  SpO2: 100 % (12/10/24 1701) Vital Signs (24h Range):  Temp:  [96.3 °F (35.7 °C)-98.3 °F (36.8 °C)] 96.4 °F (35.8 °C)  Pulse:  [76-87] 81  Resp:  [10-28] 15  SpO2:  [88 %-100 %] 100 %  BP: ()/(37-64) 77/42     Weight: (!) 141 kg (310 lb 13.6 oz) (12/07/24 0501)  Body mass index is 41.01 kg/m².  Body surface area is 2.69 meters squared.    I/O  last 3 completed shifts:  In: 8465 [P.O.:690; I.V.:7775]  Out: 03349 [Other:99992]     Physical Exam  Constitutional:       General: He is not in acute distress.     Appearance: He is ill-appearing. He is not toxic-appearing.   HENT:      Head: Normocephalic and atraumatic.   Eyes:      Extraocular Movements: Extraocular movements intact.      Pupils: Pupils are equal, round, and reactive to light.   Cardiovascular:      Rate and Rhythm: Normal rate.   Pulmonary:      Effort: No respiratory distress.      Breath sounds: No wheezing or rales.   Abdominal:      General: There is distension.      Tenderness: There is no abdominal tenderness. There is no guarding.   Musculoskeletal:      Right lower leg: Edema present.      Left lower leg: Edema present.   Neurological:      Mental Status: He is oriented to person, place, and time.   Psychiatric:         Behavior: Behavior normal.          Significant Labs:  CBC:   Recent Labs   Lab 12/10/24  0355   WBC 7.79   RBC 2.66*   HGB 6.5*   HCT 21.6*   PLT 40*   MCV 81*   MCH 24.4*   MCHC 30.1*     CMP:   Recent Labs   Lab 12/10/24  0355 12/10/24  1435   GLU 98 97   CALCIUM 8.3* 8.3*   ALBUMIN 2.3* 2.4*   PROT 5.4*  --    * 133*   K 3.7 3.6   CO2 12* 13*   * 112*   BUN 10 12   CREATININE 3.0* 2.8*   ALKPHOS 74  --    ALT 17  --    AST 43*  --    BILITOT 2.7*  --      LFTs:   Recent Labs   Lab 12/10/24  0355 12/10/24  1435   ALT 17  --    AST 43*  --    ALKPHOS 74  --    BILITOT 2.7*  --    PROT 5.4*  --    ALBUMIN 2.3* 2.4*

## 2024-12-11 NOTE — PLAN OF CARE
MICU DAILY GOALS     Family/Goals of care/Code Status   Code Status: Full Code    24H Vital Sign Range  Temp:  [96.3 °F (35.7 °C)-98.3 °F (36.8 °C)]   Pulse:  [76-87]   Resp:  [10-28]   BP: ()/(37-64)   SpO2:  [88 %-100 %]      Shift Events (include procedures and significant events)   No acute events; started on SLED @1630 for 12 hours.    AWAKE RASS: Goal - (RETIRED) RASS Goal: 0-->alert and calm  Actual - RASS (Pedraza Agitation-Sedation Scale): alert and calm    Restraint necessity: Not necessary   BREATHE SBT: Not intubated    Coordinate A & B, analgesics/sedatives Pain: managed   SAT: Not intubated   Delirium CAM-ICU: (RETIRED) Overall CAM-ICU: Negative   Early(intubated/ Progressive (non-intubated) Mobility MOVE Screen (INTUBATED ONLY): Not intubated    Activity: Activity Management: Rolling - L1   Feeding/Nutrition Diet order: Diet/Nutrition Received: low saturated fat/low cholesterol, Specialty Diet/Nutrition Received: renal diet   Thrombus DVT prophylaxis: VTE Core Measure: (SCDs) Sequential compression device initiated/maintained   HOB Elevation Head of Bed (HOB) Positioning: HOB elevated   Ulcer Prophylaxis GI: yes   Glucose control managed Glycemic Management: blood glucose monitored   Skin Skin assessment:     Sacrum intact/not altered? Yes  Heels intact/not altered? Yes  Surgical wound? No    CHECK ONE!   (no altered skin or altered skin) and sub boxes:  [] No Altered Skin Integrity Present    []Prevention Measures Documented    [] Altered Skin Integrity Present or Discovered   [] LDA present in EPIC, daily doc completed              [] LDA added if not in EPIC (describe wound).                    When describing wound, do not stage, use descriptive words only.    [] Wound Image Taken (required on admit,                   transfer/discharge and every Tuesday)    Wound Care Consulted? No   Bowel Function no issues    Indwelling Catheter Necessity [REMOVED]      Urethral Catheter 11/26/24  1738-Reason for Continuing Urinary Catheterization: Urinary retention  [REMOVED]      Urethral Catheter 12/03/24 1607 Silicone 16 Fr.-Reason for Continuing Urinary Catheterization: Critically ill in ICU and requiring hourly monitoring of intake/output  [REMOVED]      Urethral Catheter 11/20/24 1802 Double-lumen-Reason for Continuing Urinary Catheterization: Urinary retention    Trialysis (Dialysis) Catheter 11/24/24 0441 right internal jugular-Line Necessity Review: CRRT/HD     De-escalation Antibiotics No        VS and assessment per flow sheet, patient progressing towards goals as tolerated, plan of care reviewed with family, all concerns addressed, will continue to monitor.

## 2024-12-11 NOTE — PROGRESS NOTES
[facility] - Medical ICU  Critical Care Medicine  Progress Note    Patient Name: Juan Carlos Yoo Sr.  MRN: [id number]  Admission Date: 11/20/2024  Hospital Length of Stay: 21 days  Code Status: Full Code  Attending Provider: Mu Bird MD  Primary Care Provider: Nyla Rios FNP   Principal Problem: Decompensated cirrhosis    Subjective:     HPI:  Juan Carlos Yoo is a 69 male with PMH of alcoholic cirrhosis, esophageal varices, Afib on eliquis, and HTN who presents for c/o worsening diffuse swelling as well as R arm pain/erythema. He was recently hospitalized 1 month ago at [facility] for volume overload in the setting of decompensated cirrhosis. He was treated with IV diuresis and discharged with adjustment to his diuretics, currently on lasix 60mg BID and metolazone 2.5mg every other week as per family. Patient reports diffuse swelling of his upper and lower extremities in addition to distended abdomen and worsening dyspnea, which he believes is due to recent medication adjustment. Family states he is non-compliant with low sodium diet and fluid restriction. Family states he has been compliant with diuretics, but rom't taken eliquis for 3 days due to issue getting refill. He also reports scratching right arm on door frame 3-4 days ago which has become red and painful after wrapping in in bandage. He claims to be compliant with medications, but is a poor historian. He follows with hepatology, not currently active on transplant list. He states he hasn't consumed alcohol for at least 9 months. Has c/o chills, but denies fever, cough, nausea, vomiting, chest pain, dysuria, hematuria, blood in stool. Workup in ED remarkable for VS: T 97.6 HR 94 RR 22 BP 95/56 O2 sat 97% on RA. Hb 6.7, MCV 75, Plt 81, PT/INR 22.6/2.2, Na 128, K 6.1, BUN/Cr 49/5.7, Alb 2.8, AST/ALT 35/9, Ammonia 104, , Trop neg, LA 2.9, CXR: Cardiac size is enlarged similar to prior.  No large volume of pleural fluid noted although there is mild blunting  of the right costophrenic angle which may relate to a small amount of pleural fluid.  Suspected right basilar opacity, to be correlated clinically for infection. RUE venous doppler: No thrombus in central veins of the right upper extremity. Patient received vanc/zosyn and lasix 80mg IVP in ED and will be admitted to hospital medicine service for further management.     Pt was admitted to hospital medicine. Blood cultures positive for Gram-positive cocci and Gram-negative rods. ID has been consulted. S/p 2 units PRBC for Hemoglobin dropped 6.8 on 11/21. Pt was on Eliquis for atrial fibrillation prior to admission which was held.  Hepatology following patient's clinical course, but are not evaluating him for transplant at this time. Diuretics were held because of concern for HRS.  Patient was started on albumin and midodrine per Nephrology recommendations for HRS.  Renal function continued to worsen and recommendation per Nephrology to transfer to ICU for maintaining goal map over 85 on Levophed.  ICU was notified and patient to be assessed to be transferred to ICU.       Hospital/ICU Course:  69 y.o. male with history of EtOH use disorder, EtOH cirrhosis, HCC s/p y90, morbid obesity BMI 44, HTN, and Afib who presents with severe anasarca and JAE.      Appreciate hepatology and nephrology recommendations.  Diuretics were held because of concern for HRS.  Patient was started on albumin and midodrine per Nephrology recommendations for HRS.  Renal function continued to worsen and recommendation per Nephrology to transfer to ICU for maintaining goal map over 85 on Levophed.  ICU was notified and patient to be assessed to be transferred to ICU.     Patient also has Gram-positive cocci and Gram-negative rods in his blood now.  Possible sources could be got translocation and barrier related infection through skin as he has been significantly edematous and has been oozing.  Has been on vancomycin and Rocephin.  Id was  consulted this morning.  Repeat blood cultures were obtained.     Continues to have anemia.  Hemoglobin dropped on 11/20 and was given 1 unit of packed red blood cells.  Did not respond appropriately.  Hemoglobin dropped again to 6.8 on 11/21 and was given 1 unit of packed red blood cells.  Hemoglobin again on 11/22 is 7.2.  No reported melena or hematochezia.  Patient was on Eliquis for atrial fibrillation prior to admission which was held.  Given history of esophageal varices and portal hypertensive gastropathy whereas also could be component of slow bleeding, under production due to CKD and hemolysis while also with decompensated cirrhosis.  Started on Protonix IV b.i.d. and octreotide.     Also clarified with hepatology.  Given patient's current infection we will await ID recommendations however we will be challenging to consider transplant evaluation at this time.  Trend clinical course     Goal clarification with the patient and family.  Patient at this time wants to be full code and continue with Levophed in ICU.  They would consider discussion with palliative care in a day or so if things do not get better.     In MICU patient started on Levophed, however titration remained difficult given tachycardic response from the patient.     11/24 Trialysis and arterial lines placed overnight and currently on levo and norepi. SLED today.    11/25 Boo dc. Increased midodrine. Continue steroids until d/c pressors. Discussed with Nephrology about our concern for sustained use of pressors to achieve their MAP goals of 85. Will follow up tomorrow.     11/26 Platelets 48. Repeat 38 confirming thrombocytopenia. Concern for HIT. D/c heparin. Increased lactulose dosing. Bladder scan revealed urine in bladder. Boo placed. Off vaso. Continuing levo. Maintain MAP goals 80. PT/OT consulted.     11/27 MAP goal 75-80. Heparin antibody result pending.     11/28 Pt with abdominal pain, decreased bowel movements, emesis. Lactic  acid 2.9 and repeat 2.7. Less concerned for mesenteric ischemia and more of a concern was for SBO. NG tube placed with subsequent suction of 500mL fluid. Abdomen less distended after placement and pt subsequently had bowel movement. MAP Goal of 60 with plan to come off pressors entirely and switch to dialysis after permacath, as he is not  liver transplant eligible at this time.     11/29 Pt with ileus. Clear liquids for now. Platelets 24. HIT versus zosyn induced? Peripheral smear sent. Zosyn changed to kaylah. Consent and transfuse 1U. GOC conversation today. Pt opting for long term dialysis.     11/30 naeon. Started back on heparin. One time dose of 120mg lasix today. Hold off SLED/SCUF today and give IV lasix 120 mg once; plan for SLED/SCUF tomorrow     12/1 Patient is becoming more dependent on dialysis. Not a current liver transplant candidate. Still complaining of abdominal pain after discontinuing the NGT. AXR with evidence of dilated bowel loops. Brown bomb administered. The days following administration of the brown bomb, patient had more frequent bowel movements and was able to pass flatulence. Constipation eventually resolved.   Given the need for pressor support, he was worked up for adrenal insufficiency which was positive. Consequently, he was started on steroids for adrenal insufficiency. His blood pressures improved, however, he still required pressor support, particularly during dialysis. Pressor support was eventually weaned off.       Interval History/Significant Events: Patient was on SLED overnight, had episodes of hypotension, given 500mL x2 boluses of LR with some improvement in BP. Hgb also dropped and was given a unit of blood. Reports improvement in N/V from yesterday.    Denies HA, fevers, chills, chest pain, palpitations, SOB, abdominal pain, N/V.    Review of Systems  Objective:     Vital Signs (Most Recent):  Temp: 97.9 °F (36.6 °C) (12/11/24 0701)  Pulse: 74 (12/11/24 0900)  Resp: 16  (12/11/24 0900)  BP: (!) 91/52 (12/11/24 0900)  SpO2: 95 % (12/11/24 0900) Vital Signs (24h Range):  Temp:  [96.4 °F (35.8 °C)-97.9 °F (36.6 °C)] 97.9 °F (36.6 °C)  Pulse:  [70-82] 74  Resp:  [8-22] 16  SpO2:  [95 %-100 %] 95 %  BP: ()/(35-58) 91/52   Weight: (!) 141 kg (310 lb 13.6 oz)  Body mass index is 41.01 kg/m².      Intake/Output Summary (Last 24 hours) at 12/11/2024 1029  Last data filed at 12/11/2024 0801  Gross per 24 hour   Intake 3573.31 ml   Output 3886 ml   Net -312.69 ml          Physical Exam  Vitals and nursing note reviewed.   Constitutional:       General: He is awake. He is not in acute distress.     Appearance: He is obese. He is ill-appearing. He is not toxic-appearing.   HENT:      Head: Normocephalic and atraumatic.   Eyes:      Extraocular Movements: Extraocular movements intact.      Conjunctiva/sclera: Conjunctivae normal.   Cardiovascular:      Rate and Rhythm: Normal rate.   Pulmonary:      Effort: Pulmonary effort is normal. No respiratory distress.   Abdominal:      General: There is distension.      Palpations: Abdomen is soft.      Tenderness: There is no abdominal tenderness. There is no guarding or rebound.   Musculoskeletal:         General: No swelling or tenderness.      Right lower leg: Edema present.      Left lower leg: Edema present.      Comments: 2+ pitting edema in bilateral lower extremities   Skin:     General: Skin is warm.      Coloration: Skin is not jaundiced.      Findings: Bruising present.   Neurological:      Mental Status: He is alert and oriented to person, place, and time.      Motor: No weakness.            Vents:     Lines/Drains/Airways       Central Venous Catheter Line  Duration             Trialysis (Dialysis) Catheter 11/24/24 0441 right internal jugular 17 days              Drain  Duration             Male External Urinary Catheter 12/08/24 1532 2 days              Peripheral Intravenous Line  Duration                  Peripheral IV - Single  Lumen 11/27/24 1101 20 G Anterior;Left Forearm 13 days                  Significant Labs:    CBC/Anemia Profile:  Recent Labs   Lab 12/10/24  0355 12/11/24  0333   WBC 7.79 6.23   HGB 6.5* 6.0*   HCT 21.6* 19.4*   PLT 40* 21*   MCV 81* 80*   RDW 25.9* 25.1*        Chemistries:  Recent Labs   Lab 12/10/24  0355 12/10/24  1435 12/10/24  2117 12/11/24  0333   * 133* 133*  133* 134*  133*  133*   K 3.7 3.6 3.7  3.7 3.7  3.7  3.7   * 112* 109  109 108  107  107   CO2 12* 13* 18*  18* 20*  19*  19*   BUN 10 12 7*  7* 4*  5*  5*   CREATININE 3.0* 2.8* 1.7*  1.7* 1.2  1.2  1.2   CALCIUM 8.3* 8.3* 7.5*  7.5* 7.2*  7.1*  7.1*   ALBUMIN 2.3* 2.4* 2.2*  2.2* 2.0*  2.0*  2.0*   PROT 5.4*  --   --  4.8*   BILITOT 2.7*  --   --  4.3*   ALKPHOS 74  --   --  67   ALT 17  --   --  15   AST 43*  --   --  30   MG 2.0 2.0 1.8  1.8 1.9  1.9   PHOS 4.5 4.7* 2.5*  2.5* 2.7  2.7  2.7       All pertinent labs within the past 24 hours have been reviewed.    Significant Imaging:  I have reviewed all pertinent imaging results/findings within the past 24 hours.    ABG  Recent Labs   Lab 12/10/24  0718   PH 7.253*  7.253*   PO2 33*  33*   PCO2 37.2  37.2   HCO3 16.4*  16.4*   BE -11*  -11*     Assessment/Plan:     Neuro  Encephalopathy, metabolic  Resolved  AAOx4  Will assess for signs of encephalopathy     Cardiac/Vascular  Atrial fibrillation  Holding home Eliquis in the setting of recent low blood counts  Continue metoprolol 12.5 mg BID    Renal/  Stage 3a chronic kidney disease  Nephrology following  Will try to remove more fluid  Strict intake and output  Renally dose all medications  Avoid nephrotoxic agents  Discuss with nephrology placement of tunneled line for HD    JAE (acute kidney injury)  Baseline creatinine is 1.5. May require dialysis long term.   Avoid nephrotoxins and renally dose meds for GFR listed above  Monitor urine output, serial BMP, and adjust therapy as needed  Pending  nephrology recs regarding tunneled cath and transitioning to HD    ID  Gram-negative bacteremia  Blood cultures from admission with B. Diminuta, micrococcus luteus, lysinobacillus species. Seen by ID previously who recommended stopping antibiotics due to concern these were contaminants. Repeat blood cultures have been no growth. Has since been transferred to ICU with increased pressor requirement. Blood cultures repeated on 11/24 are no growth x 24 hours.   11/29 Switched from zosyn to meropenem due to concern of thrombocytopenia.   Finished meropenem course 12/8 @ 6 PM    Severe sepsis  This patient does have evidence of infective focus  My overall impression is sepsis.  Source: Abdominal    Organ dysfunction indicated by Acute kidney injury    Fluid challenge Contraindicated- Fluid bolus is contraindicated in this patient due to End Stage Liver Disease     Post- resuscitation assessment No - Post resuscitation assessment not needed     Source control achieved by: antibiotics  Patient finished course of abx (meropenem) 12/8    Hematology  Thrombocytopenia  Ongoing issue, could be related to ESLD or medication induced.   Daily CBC  Transfuse for PLT<10k, or PLT<50K with active bleeding  Monitor for signs and symptoms of bleeding  Peripheral blood smear to evaluate thrombocytopenia    Coagulopathy  End Stage Liver Disease precipitated coagulopathy  Heparin d/c d/t thrombocytopenia  CTM    Endocrine  Adrenal insufficiency  On going problem since admission, most likely multifactorial - component of liver dysfunction, HRS, sepsis on admission.  Continues to require levo, midodrine has been up titrated.   Random cortisol was 6   Persistent hypotension thought to be adrenal insufficiency.   Continue steroids  Consider endocrinology consult.      Hyponatremia  Likely dilutional secondary to end stage liver disease and HRS.   CTM, correct if appropriate    GI  * Decompensated cirrhosis  MELD 3.0: 32 at 11/29/2024  9:57  PM  MELD-Na: 31 at 11/29/2024  9:57 PM  Calculated from:  Serum Creatinine: 3 mg/dL at 11/29/2024  9:57 PM  Serum Sodium: 132 mmol/L at 11/29/2024  9:57 PM  Total Bilirubin: 2.9 mg/dL at 11/29/2024 10:34 AM  Serum Albumin: 2.8 g/dL at 11/29/2024  9:57 PM  INR(ratio): 2 at 11/27/2024  2:09 AM  Age at listing (hypothetical): 69 years  Sex: Male at 11/29/2024  9:57 PM    Per hepatology, pt is not a transplant candidate at this time      Abdominal pain  Patient developed abdominal pain 2/2 constipation last week and NGT was placed. 11/30 NGT was removed. The day after the NGT was removed he developed constipation and dilated bowel loops on AXR.  Some BM after brown bomb enema.   CTM  Lactulose TID  Last BM 12/8    Palliative Care  Palliative care encounter  Will coordinate GOC discussion    Goals of care, counseling/discussion  Advance Care Planning  Will plan to coordinate with hepatology and palliative.            Critical Care Daily Checklist:    A: Awake: RASS Goal/Actual Goal: (RETIRED) RASS Goal: 0-->alert and calm  Actual:     B: Spontaneous Breathing Trial Performed?     C: SAT & SBT Coordinated?                       D: Delirium: CAM-ICU (RETIRED) Overall CAM-ICU: Negative   E: Early Mobility Performed? Yes   F: Feeding Goal: Goals: to meet % of EEN/EPN by next RD f/u  Status: Nutrition Goal Status: goal not met   Current Diet Order   Procedures    Diet Renal Standard Tray     Order Specific Question:   Tray type:     Answer:   Standard Tray      AS: Analgesia/Sedation    T: Thromboembolic Prophylaxis    H: HOB > 300 Yes   U: Stress Ulcer Prophylaxis (if needed) Yes   G: Glucose Control    B: Bowel Function Stool Occurrence: 1   I: Indwelling Catheter (Lines & Boo) Necessity Trialysis, PIV   D: De-escalation of Antimicrobials/Pharmacotherapies     Plan for the day/ETD Fluid removal    Code Status:  Family/Goals of Care: Full Code         Critical secondary to Patient has a condition that poses threat to  life and bodily function: Acute Renal Failure      Critical care was time spent personally by me on the following activities: development of treatment plan with patient or surrogate and bedside caregivers, discussions with consultants, evaluation of patient's response to treatment, examination of patient, ordering and performing treatments and interventions, ordering and review of laboratory studies, ordering and review of radiographic studies, pulse oximetry, re-evaluation of patient's condition. This critical care time did not overlap with that of any other provider or involve time for any procedures.     Rhona Lewis, DO  Critical Care Medicine  [facility] ICU

## 2024-12-12 PROBLEM — E44.0 MODERATE PROTEIN-CALORIE MALNUTRITION: Status: ACTIVE | Noted: 2024-12-12

## 2024-12-12 LAB
ALBUMIN SERPL BCP-MCNC: 2.1 G/DL (ref 3.5–5.2)
ALBUMIN SERPL BCP-MCNC: 2.2 G/DL (ref 3.5–5.2)
ALBUMIN SERPL BCP-MCNC: 2.2 G/DL (ref 3.5–5.2)
ALP SERPL-CCNC: 71 U/L (ref 40–150)
ALT SERPL W/O P-5'-P-CCNC: 14 U/L (ref 10–44)
ANION GAP SERPL CALC-SCNC: 8 MMOL/L (ref 8–16)
ANISOCYTOSIS BLD QL SMEAR: SLIGHT
AST SERPL-CCNC: 26 U/L (ref 10–40)
BASOPHILS # BLD AUTO: 0.02 K/UL (ref 0–0.2)
BASOPHILS NFR BLD: 0.3 % (ref 0–1.9)
BILIRUB SERPL-MCNC: 3.7 MG/DL (ref 0.1–1)
BUN SERPL-MCNC: 12 MG/DL (ref 8–23)
BUN SERPL-MCNC: 14 MG/DL (ref 8–23)
BUN SERPL-MCNC: 14 MG/DL (ref 8–23)
BURR CELLS BLD QL SMEAR: ABNORMAL
CALCIUM SERPL-MCNC: 8.2 MG/DL (ref 8.7–10.5)
CALCIUM SERPL-MCNC: 8.5 MG/DL (ref 8.7–10.5)
CALCIUM SERPL-MCNC: 8.5 MG/DL (ref 8.7–10.5)
CHLORIDE SERPL-SCNC: 105 MMOL/L (ref 95–110)
CHLORIDE SERPL-SCNC: 105 MMOL/L (ref 95–110)
CHLORIDE SERPL-SCNC: 107 MMOL/L (ref 95–110)
CO2 SERPL-SCNC: 18 MMOL/L (ref 23–29)
CREAT SERPL-MCNC: 2.7 MG/DL (ref 0.5–1.4)
CREAT SERPL-MCNC: 2.9 MG/DL (ref 0.5–1.4)
CREAT SERPL-MCNC: 2.9 MG/DL (ref 0.5–1.4)
DIFFERENTIAL METHOD BLD: ABNORMAL
EOSINOPHIL # BLD AUTO: 0 K/UL (ref 0–0.5)
EOSINOPHIL NFR BLD: 0.5 % (ref 0–8)
ERYTHROCYTE [DISTWIDTH] IN BLOOD BY AUTOMATED COUNT: 25.2 % (ref 11.5–14.5)
EST. GFR  (NO RACE VARIABLE): 22.4 ML/MIN/1.73 M^2
EST. GFR  (NO RACE VARIABLE): 22.4 ML/MIN/1.73 M^2
EST. GFR  (NO RACE VARIABLE): 24.4 ML/MIN/1.73 M^2
GLUCOSE SERPL-MCNC: 114 MG/DL (ref 70–110)
GLUCOSE SERPL-MCNC: 139 MG/DL (ref 70–110)
GLUCOSE SERPL-MCNC: 139 MG/DL (ref 70–110)
HCT VFR BLD AUTO: 21.5 % (ref 40–54)
HGB BLD-MCNC: 7.1 G/DL (ref 14–18)
HYPOCHROMIA BLD QL SMEAR: ABNORMAL
IMM GRANULOCYTES # BLD AUTO: 0.05 K/UL (ref 0–0.04)
IMM GRANULOCYTES NFR BLD AUTO: 0.8 % (ref 0–0.5)
INR PPP: 1.9 (ref 0.8–1.2)
LYMPHOCYTES # BLD AUTO: 0.7 K/UL (ref 1–4.8)
LYMPHOCYTES NFR BLD: 11.2 % (ref 18–48)
MAGNESIUM SERPL-MCNC: 1.9 MG/DL (ref 1.6–2.6)
MAGNESIUM SERPL-MCNC: 2 MG/DL (ref 1.6–2.6)
MAGNESIUM SERPL-MCNC: 2 MG/DL (ref 1.6–2.6)
MCH RBC QN AUTO: 26.9 PG (ref 27–31)
MCHC RBC AUTO-ENTMCNC: 33 G/DL (ref 32–36)
MCV RBC AUTO: 81 FL (ref 82–98)
MONOCYTES # BLD AUTO: 0.8 K/UL (ref 0.3–1)
MONOCYTES NFR BLD: 13.7 % (ref 4–15)
NEUTROPHILS # BLD AUTO: 4.5 K/UL (ref 1.8–7.7)
NEUTROPHILS NFR BLD: 73.5 % (ref 38–73)
NRBC BLD-RTO: 0 /100 WBC
OVALOCYTES BLD QL SMEAR: ABNORMAL
PATH REV BLD -IMP: NORMAL
PHOSPHATE SERPL-MCNC: 3.3 MG/DL (ref 2.7–4.5)
PHOSPHATE SERPL-MCNC: 3.3 MG/DL (ref 2.7–4.5)
PHOSPHATE SERPL-MCNC: 3.8 MG/DL (ref 2.7–4.5)
PLATELET # BLD AUTO: 23 K/UL (ref 150–450)
PLATELET BLD QL SMEAR: ABNORMAL
PMV BLD AUTO: ABNORMAL FL (ref 9.2–12.9)
POIKILOCYTOSIS BLD QL SMEAR: ABNORMAL
POLYCHROMASIA BLD QL SMEAR: ABNORMAL
POTASSIUM SERPL-SCNC: 3.3 MMOL/L (ref 3.5–5.1)
POTASSIUM SERPL-SCNC: 3.6 MMOL/L (ref 3.5–5.1)
POTASSIUM SERPL-SCNC: 3.6 MMOL/L (ref 3.5–5.1)
PROT SERPL-MCNC: 5.2 G/DL (ref 6–8.4)
PROTHROMBIN TIME: 20 SEC (ref 9–12.5)
RBC # BLD AUTO: 2.64 M/UL (ref 4.6–6.2)
SCHISTOCYTES BLD QL SMEAR: ABNORMAL
SODIUM SERPL-SCNC: 131 MMOL/L (ref 136–145)
SODIUM SERPL-SCNC: 131 MMOL/L (ref 136–145)
SODIUM SERPL-SCNC: 133 MMOL/L (ref 136–145)
TARGETS BLD QL SMEAR: ABNORMAL
WBC # BLD AUTO: 6.14 K/UL (ref 3.9–12.7)

## 2024-12-12 PROCEDURE — 80053 COMPREHEN METABOLIC PANEL: CPT

## 2024-12-12 PROCEDURE — 94761 N-INVAS EAR/PLS OXIMETRY MLT: CPT

## 2024-12-12 PROCEDURE — 80069 RENAL FUNCTION PANEL: CPT | Performed by: STUDENT IN AN ORGANIZED HEALTH CARE EDUCATION/TRAINING PROGRAM

## 2024-12-12 PROCEDURE — 85025 COMPLETE CBC W/AUTO DIFF WBC: CPT

## 2024-12-12 PROCEDURE — 20000000 HC ICU ROOM

## 2024-12-12 PROCEDURE — 99233 SBSQ HOSP IP/OBS HIGH 50: CPT | Mod: ,,, | Performed by: INTERNAL MEDICINE

## 2024-12-12 PROCEDURE — 25000003 PHARM REV CODE 250: Performed by: INTERNAL MEDICINE

## 2024-12-12 PROCEDURE — 97530 THERAPEUTIC ACTIVITIES: CPT

## 2024-12-12 PROCEDURE — 97535 SELF CARE MNGMENT TRAINING: CPT

## 2024-12-12 PROCEDURE — 85610 PROTHROMBIN TIME: CPT | Performed by: STUDENT IN AN ORGANIZED HEALTH CARE EDUCATION/TRAINING PROGRAM

## 2024-12-12 PROCEDURE — 99291 CRITICAL CARE FIRST HOUR: CPT | Mod: ,,, | Performed by: INTERNAL MEDICINE

## 2024-12-12 PROCEDURE — 83735 ASSAY OF MAGNESIUM: CPT | Mod: 91 | Performed by: STUDENT IN AN ORGANIZED HEALTH CARE EDUCATION/TRAINING PROGRAM

## 2024-12-12 PROCEDURE — 25000003 PHARM REV CODE 250

## 2024-12-12 PROCEDURE — 27000923 HC TRIALYSIS CATHETER, ANY SIZE

## 2024-12-12 PROCEDURE — 83735 ASSAY OF MAGNESIUM: CPT | Performed by: STUDENT IN AN ORGANIZED HEALTH CARE EDUCATION/TRAINING PROGRAM

## 2024-12-12 PROCEDURE — 80100014 HC HEMODIALYSIS 1:1

## 2024-12-12 PROCEDURE — 84100 ASSAY OF PHOSPHORUS: CPT

## 2024-12-12 RX ORDER — MIDODRINE HYDROCHLORIDE 5 MG/1
20 TABLET ORAL DAILY PRN
Status: DISCONTINUED | OUTPATIENT
Start: 2024-12-12 | End: 2024-12-13

## 2024-12-12 RX ADMIN — LACTULOSE 30 G: 20 SOLUTION ORAL at 09:12

## 2024-12-12 RX ADMIN — POTASSIUM BICARBONATE 40 MEQ: 391 TABLET, EFFERVESCENT ORAL at 10:12

## 2024-12-12 RX ADMIN — MIDODRINE HYDROCHLORIDE 20 MG: 5 TABLET ORAL at 09:12

## 2024-12-12 RX ADMIN — POTASSIUM BICARBONATE 40 MEQ: 391 TABLET, EFFERVESCENT ORAL at 08:12

## 2024-12-12 RX ADMIN — MIDODRINE HYDROCHLORIDE 20 MG: 5 TABLET ORAL at 06:12

## 2024-12-12 RX ADMIN — FAMOTIDINE 20 MG: 20 TABLET ORAL at 09:12

## 2024-12-12 RX ADMIN — MIDODRINE HYDROCHLORIDE 20 MG: 5 TABLET ORAL at 04:12

## 2024-12-12 RX ADMIN — HYDROCORTISONE 10 MG: 5 TABLET ORAL at 02:12

## 2024-12-12 RX ADMIN — TAMSULOSIN HYDROCHLORIDE 0.4 MG: 0.4 CAPSULE ORAL at 09:12

## 2024-12-12 RX ADMIN — METOPROLOL TARTRATE 12.5 MG: 25 TABLET, FILM COATED ORAL at 09:12

## 2024-12-12 RX ADMIN — HYDROCORTISONE 15 MG: 5 TABLET ORAL at 06:12

## 2024-12-12 RX ADMIN — MIDODRINE HYDROCHLORIDE 20 MG: 5 TABLET ORAL at 02:12

## 2024-12-12 NOTE — PT/OT/SLP PROGRESS
Occupational Therapy   Treatment    Name: JuanC arlos Yoo Sr.  MRN: [id number]  Admitting Diagnosis:  Decompensated cirrhosis       Recommendations:     Discharge Recommendations: Moderate Intensity Therapy  Discharge Equipment Recommendations:  walker, rolling, wheelchair, bedside commode, grab bar, bath bench, hospital bed  Barriers to discharge:  Decreased caregiver support (Increased assistance with ADLs and mobility)    Assessment:     Juan Carlos Yoo Sr. is a 69 y.o. male with a medical diagnosis of Decompensated cirrhosis. Performance deficits affecting function are weakness, impaired endurance, impaired self care skills, impaired functional mobility, gait instability, impaired balance, decreased safety awareness, decreased lower extremity function, edema. Patient cleared for therapy and agreed to participate in therapy. Patient concerned that he needed to have a bowel movement. Patient sat EOB and as patient performed supine>sit, patient did a little bowel movement during mobility. Patient then agreed to transfer to [facility] [place] as patient felt like he needed to go more and then get cleaned up there. As patient stood and transferred to BS, patient was unable to hold it in and had an uncontrollable fecal episode during the transfer. Patient still needed to go more on the BSC as well get completely cleaned up with front gown and socks needing to be changed. Patient then utilized a RW for sit<>stands after toileting during toileting hygiene and transfer to bedside chair. Nurse aware. Patient would benefit from continued skilled acute OT 4x/wk to improve functional mobility, increase independence with ADLs, and address established goals. Recommending moderate intensity therapy once medically appropriate for discharge to increase maximal independence, reduce burden of care, and ensure safety.     Rehab Prognosis:  Good; patient would benefit from acute skilled OT services to address these deficits and reach maximum level of  function.       Plan:     Patient to be seen 4 x/week to address the above listed problems via self-care/home management, therapeutic activities, therapeutic exercises  Plan of Care Expires: 12/27/24  Plan of Care Reviewed with: patient    Subjective     Chief Complaint: Needing to have a bowel movement  Patient/Family Comments/goals: patient agreed to therapy  Pain/Comfort:  Pain Rating 1: 0/10  Pain Rating Post-Intervention 1: 0/10    Objective:     Communicated with: NSG prior to session.  Patient found HOB elevated with blood pressure cuff, pulse ox (continuous), telemetry, pressure relief boots, Trialysis, PureWick upon OT entry to room.    General Precautions: Standard, fall    Orthopedic Precautions:N/A  Braces: N/A  Respiratory Status: Room air     Occupational Performance:     Bed Mobility:    Patient completed Supine to Sit with moderate assistance with HOB elevated    Functional Mobility/Transfers:  Patient completed Sit <> Stand Transfer with minimum assistance  with  hand-held assist from EOB; CGA with RW from BSC   Patient completed Toilet Transfer bed>BSC stand pivot technique with minimum assistance with  hand-held assist with fecal incontinent episode during transfer; BSC>bedside chair with RW and CGA stand pivot technique    Activities of Daily Living:  Upper Body Dressing: maximal assistance [place] and donning front gown as it needed to be changed while on BSC due to being soiled and max A due to lines  Lower Body Dressing: total assistance Donning socks prior to mobility. Patient needed socks changed also when on BSC as they were soiled during session.   Toileting: total assistance Patient needed to be cleaned well from bowel movement during session including B LEs and B feet.       AMPAC 6 Click ADL: 12    Treatment & Education:  Role of OT and POC  ADL retraining  Functional mobility training  Safety  Importance EOB/OOB activity    Co-treatment performed due to patient's multiple deficits  requiring two skilled therapists to appropriately and safely assess patient's strength and endurance while facilitating functional tasks in addition to accommodating for patient's activity tolerance.     Patient left up in chair with all lines intact, call button in reach, and all needs met.  Family present and nurse aware.    GOALS:   Multidisciplinary Problems       Occupational Therapy Goals          Problem: Occupational Therapy    Goal Priority Disciplines Outcome Interventions   Occupational Therapy Goal     OT, PT/OT Progressing    Description: Goals to be met by: 12/25/24     Patient will increase functional independence with ADLs by performing:    UE Dressing with Set-up Assistance.  LE Dressing with Stand-by Assistance.  Grooming while standing at sink with Stand-by Assistance.  Toileting from toilet with Stand-by Assistance for hygiene and clothing management.   Step transfer: with SBA and use of LRAD  Supine to sit with SBA  Toilet transfer to toilet with SBA and use of LRAD.  Independent with HEP to BUE to improve ROM                         Time Tracking:     OT Date of Treatment: 12/12/24  OT Start Time: 0955  OT Stop Time: 1040  OT Total Time (min): 45 min    Billable Minutes:Self Care/Home Management 45               12/12/2024

## 2024-12-12 NOTE — PLAN OF CARE
Recommendations     1.) Recommend to liberalize the diet to Low Na to help increase PO intake.                  - RN staff: please continue to document PO intake.      2.) Recommend to discontinue Boost Breeze, provide Novasource Renal TID to help meet increased needs.                  - may change to Boost Plus TID if pt does not  like     3.) RD to monitor wt, labs, skin, PO intake.      Goals: to meet % of EEN/EPN by next RD f/u  Nutrition Goal Status: goal not met  Communication of RD Recs:  (POC)

## 2024-12-12 NOTE — ASSESSMENT & PLAN NOTE
Malnutrition Type:  Context: acute illness or injury  Level: moderate    Related to (etiology):   Inadequate protein- calorie intake    Signs and Symptoms (as evidenced by):   Mild muscle- fat wasting, moderate to severe edema present, and poor PO intake     Malnutrition Characteristic Summary:  Energy Intake (Malnutrition): less than 75% for greater than 7 days  Subcutaneous Fat (Malnutrition): mild depletion  Muscle Mass (Malnutrition): mild depletion  Fluid Accumulation (Malnutrition): moderate to severe    Interventions/Recommendations (treatment strategy):  Collaboration of nutritional care with other providers.   ONS    Nutrition Diagnosis Status:   New

## 2024-12-12 NOTE — SUBJECTIVE & OBJECTIVE
Interval History/Significant Events: NAEON. No dialysis overnight. Patient reports feeling better, he was able to sit in the chair. Endorses worsening edema in his lower extremities.     Denies HA, fevers, chills, chest pain, palpitations, SOB, abdominal pain, N/V.     Review of Systems  Objective:     Vital Signs (Most Recent):  Temp: 97.8 °F (36.6 °C) (12/12/24 1101)  Pulse: 83 (12/12/24 1201)  Resp: 17 (12/12/24 1201)  BP: (!) 113/54 (12/12/24 1201)  SpO2: 100 % (12/12/24 1201) Vital Signs (24h Range):  Temp:  [96.9 °F (36.1 °C)-98.3 °F (36.8 °C)] 97.8 °F (36.6 °C)  Pulse:  [71-88] 83  Resp:  [9-34] 17  SpO2:  [97 %-100 %] 100 %  BP: ()/(40-71) 113/54   Weight: (!) 141.1 kg (311 lb 1.1 oz)  Body mass index is 41.04 kg/m².      Intake/Output Summary (Last 24 hours) at 12/12/2024 1313  Last data filed at 12/12/2024 1212  Gross per 24 hour   Intake 710 ml   Output --   Net 710 ml          Physical Exam  Vitals and nursing note reviewed.   Constitutional:       General: He is awake. He is not in acute distress.     Appearance: He is obese. He is ill-appearing. He is not toxic-appearing.   HENT:      Head: Normocephalic and atraumatic.   Eyes:      Extraocular Movements: Extraocular movements intact.      Conjunctiva/sclera: Conjunctivae normal.   Cardiovascular:      Rate and Rhythm: Normal rate.   Pulmonary:      Effort: Pulmonary effort is normal. No respiratory distress.   Abdominal:      General: There is distension.      Palpations: Abdomen is soft.      Tenderness: There is no abdominal tenderness. There is no guarding or rebound.   Musculoskeletal:         General: No swelling or tenderness.      Right lower leg: Edema present.      Left lower leg: Edema present.      Comments: 2+ pitting edema in bilateral lower extremities   Skin:     General: Skin is warm.      Coloration: Skin is not jaundiced.      Findings: Bruising present.   Neurological:      Mental Status: He is alert and oriented to person,  place, and time.      Motor: No weakness.            Vents:     Lines/Drains/Airways       Central Venous Catheter Line  Duration             Trialysis (Dialysis) Catheter 11/24/24 0441 right internal jugular 18 days              Drain  Duration             Male External Urinary Catheter 12/08/24 1532 3 days              Peripheral Intravenous Line  Duration                  Peripheral IV - Single Lumen 11/27/24 1101 20 G Anterior;Left Forearm 15 days                  Significant Labs:    CBC/Anemia Profile:  Recent Labs   Lab 12/11/24  0333 12/11/24  1044 12/12/24  0331   WBC 6.23 10.97 6.14   HGB 6.0* 7.2* 7.1*   HCT 19.4* 22.6* 21.5*   PLT 21* 24* 23*   MCV 80* 80* 81*   RDW 25.1* 24.6* 25.2*        Chemistries:  Recent Labs   Lab 12/10/24  2117 12/11/24  0333 12/12/24  0331   *  133* 134*  133*  133* 133*   K 3.7  3.7 3.7  3.7  3.7 3.3*     109 108  107  107 107   CO2 18*  18* 20*  19*  19* 18*   BUN 7*  7* 4*  5*  5* 12   CREATININE 1.7*  1.7* 1.2  1.2  1.2 2.7*   CALCIUM 7.5*  7.5* 7.2*  7.1*  7.1* 8.2*   ALBUMIN 2.2*  2.2* 2.0*  2.0*  2.0* 2.1*   PROT  --  4.8* 5.2*   BILITOT  --  4.3* 3.7*   ALKPHOS  --  67 71   ALT  --  15 14   AST  --  30 26   MG 1.8  1.8 1.9  1.9 1.9  1.9  1.9  1.9  1.9  1.9   PHOS 2.5*  2.5* 2.7  2.7  2.7 3.8       All pertinent labs within the past 24 hours have been reviewed.    Significant Imaging:  I have reviewed all pertinent imaging results/findings within the past 24 hours.

## 2024-12-12 NOTE — PLAN OF CARE
MICU DAILY GOALS     Family/Goals of care/Code Status   Code Status: Full Code    24H Vital Sign Range  Temp:  [97.5 °F (36.4 °C)-98.3 °F (36.8 °C)]   Pulse:  [71-88]   Resp:  [9-36]   BP: ()/(40-71)   SpO2:  [95 %-100 %]      Shift Events (include procedures and significant events)   No acute events throughout shift    AWAKE RASS: Goal - (RETIRED) RASS Goal: 0-->alert and calm  Actual - RASS (Pedraza Agitation-Sedation Scale): alert and calm    Restraint necessity: Not necessary   BREATHE SBT: Not intubated    Coordinate A & B, analgesics/sedatives Pain: managed   SAT: Not intubated   Delirium CAM-ICU: (RETIRED) Overall CAM-ICU: Negative   Early(intubated/ Progressive (non-intubated) Mobility MOVE Screen (INTUBATED ONLY): Not intubated    Activity: Activity Management: Rolling - L1   Feeding/Nutrition Diet order: Diet/Nutrition Received: low saturated fat/low cholesterol, Specialty Diet/Nutrition Received: renal diet   Thrombus DVT prophylaxis: VTE Core Measure: (SCDs) Sequential compression device initiated/maintained   HOB Elevation Head of Bed (HOB) Positioning: HOB elevated   Ulcer Prophylaxis GI: yes   Glucose control managed Glycemic Management: blood glucose monitored   Skin Skin assessment:     Sacrum intact/not altered? Yes  Heels intact/not altered? Yes  Surgical wound? No    CHECK ONE!   (no altered skin or altered skin) and sub boxes:  [] No Altered Skin Integrity Present    []Prevention Measures Documented    [x] Altered Skin Integrity Present or Discovered   [x] LDA present in EPIC, daily doc completed              [] LDA added if not in EPIC (describe wound).                    When describing wound, do not stage, use descriptive words only.    [] Wound Image Taken (required on admit,                   transfer/discharge and every Tuesday)    Wound Care Consulted? No   Bowel Function no issues    Indwelling Catheter Necessity [REMOVED]      Urethral Catheter 11/26/24 9702-Reason for Continuing  Urinary Catheterization: Urinary retention  [REMOVED]      Urethral Catheter 12/03/24 1607 Silicone 16 Fr.-Reason for Continuing Urinary Catheterization: Critically ill in ICU and requiring hourly monitoring of intake/output  [REMOVED]      Urethral Catheter 11/20/24 1802 Double-lumen-Reason for Continuing Urinary Catheterization: Urinary retention    Trialysis (Dialysis) Catheter 11/24/24 0441 right internal jugular-Line Necessity Review: CRRT/HD     De-escalation Antibiotics Yes        VS and assessment per flow sheet, patient progressing towards goals as tolerated, plan of care reviewed with  patient , all concerns addressed, will continue to monitor.

## 2024-12-12 NOTE — PT/OT/SLP PROGRESS
Physical Therapy Co-Treatment    Patient Name:  Juan Carlos Yoo Sr.   MRN:  [id number]  Admitting Diagnosis:  Decompensated cirrhosis   Recent Surgery: * No surgery found *    Admit Date: 11/20/2024  Length of Stay: 22 days    Recommendations:     Discharge Recommendations: Moderate Intensity Therapy  Discharge Equipment Recommendations: walker, rolling, wheelchair, bedside commode, grab bar, bath bench, hospital bed   Barriers to discharge:  physical assistance needed    Appropriate transfer level with nursing staff: one person assist stand pivot bed<>chair RW    Plan:     During this hospitalization, patient to be seen 4 x/week to address the identified rehab impairments via gait training, therapeutic activities, therapeutic exercises, neuromuscular re-education and progress towards the established goals.  Plan of Care Expires:  12/27/24  Plan of Care Reviewed with: patient    Assessment     Juan Carlos Yoo Sr. is a 69 y.o. male admitted with a medical diagnosis of Decompensated cirrhosis. Pt found supine in bed and agreeable to therapy session. Session focused on functional transfers to bedside commode and beside chair, dynamic sitting (unsupported on BSC) and standing balance, and functional endurance with OOB mobility. Increased assistance required to transfer from bed to bedside commode due to HHA and fecal incontinent episode. Pt benefited from use of RW for stability with standing balance for pericare and transfer to bedside chair. Pt tolerated sitting on bedside commode for ~17 min unsupported well with dynamic activities of lifting LEs for cleaning and changing of socks. Pt would continue to benefit from skilled PT services for improved endurance with functional activities to increase safety and decrease caregiver burden.    Problem List: weakness, impaired endurance, impaired self care skills, impaired functional mobility, gait instability, impaired balance, decreased safety awareness, decreased lower extremity  "function, edema.  Rehab Prognosis: Fair; patient would benefit from acute skilled PT services to address these deficits and reach maximum level of function.      Goals:   Multidisciplinary Problems       Physical Therapy Goals          Problem: Physical Therapy    Goal Priority Disciplines Outcome Interventions   Physical Therapy Goal     PT, PT/OT Progressing    Description: Goals to be met by: 24     Patient will increase functional independence with mobility by performin. Supine to sit with Minimal Assistance  2. Sit to stand transfer with Stand By Assistance  3. Bed to chair transfer with Stand By Assistance using LRAD  4. Gait  x 150 feet with Stand By Assistance using No LRAD.   5. Pt sit on EOB x 15 min with SBA and perform functional activities to increased functional mobility.                          Subjective     RN notified prior to session. No family present upon PT entrance into room. Patient agreeable to PT evaluation.    Chief Complaint: stomach discomfort  Patient/Family Comments/goals: "Can you make sure this thing gets taped to my neck more?"  Pain/Comfort:  Pain Rating 1: 0/10  Pain Rating Post-Intervention 1: 0/10    Objective:     Additional staff present: OT and Supervising PT; OT for cotx due to pt's multiple medical comorbidities and functional deficits req'ing two skilled therapists to appropriately maximize functional potential by progressing pt's musculoskeletal strength and endurance, facilitating neuromuscular postural balance and control ,and accommodating for patient's impaired cardiopulmonary tolerance to activities.      Patient found HOB elevated with: blood pressure cuff, pulse ox (continuous), telemetry, pressure relief boots, Trialysis, PureWick   Cognition:   Oriented X 4 and Alert  Command following: Follows multistep verbal commands  Fluency: clear/fluent  General Precautions: Standard, Cardiac fall   Orthopedic Precautions:N/A   Braces: N/A   Body mass index is " "41.04 kg/m².  Oxygen Device: Room Air  Vitals: BP (!) 113/54 (BP Location: Left forearm, Patient Position: Lying)   Pulse 83   Temp 97.8 °F (36.6 °C) (Axillary)   Resp 17   Ht 6' 1" (1.854 m)   Wt (!) 141.1 kg (311 lb 1.1 oz)   SpO2 100%   BMI 41.04 kg/m²     Outcome Measures:  AM-PAC 6 CLICK MOBILITY  Turning over in bed (including adjusting bedclothes, sheets and blankets)?: 3  Sitting down on and standing up from a chair with arms (e.g., wheelchair, bedside commode, etc.): 3  Moving from lying on back to sitting on the side of the bed?: 3  Moving to and from a bed to a chair (including a wheelchair)?: 3  Need to walk in hospital room?: 3  Climbing 3-5 steps with a railing?: 2  Basic Mobility Total Score: 17     Functional Mobility:    Bed Mobility:   Supine to Sit: moderate assistance for trunk management; to L side of bed    Sitting Balance at Edge of Bed:  Static Sitting Balance: Good- : able to accept minimal resistance  Dynamic Sitting Balance: Fair : minimal weight shifting ipsilaterally or anteriorly. Difficulty crossing midline  Assistance Level Required: Stand-by Assistance and Contact Guard Assistance    Unsupported Sitting on Bedside Commode  Static Sitting Balance: Good- : able to accept minimal resistance  Dynamic Sitting Balance: Good- : able to accept minimal resistance  Assistance Level Required: Stand-by Assistance  Time: ~17 min  Comments: Pt able to sit unsupported on BSC with SBA. Pt with good dynamic sitting balance, as he was able to lift LEs while therapy staff assisted in cleaning him and changing socks.     Transfers:   Sit <> Stand Transfer: minimum assistance with hand-held assist. [id number] from EOB  Sit <> Stand Transfer: contact guard assistance with rolling walker. [id number] from bedside commode  Bed <> BSC Transfer Stand Pivot technique: minimum assistance with hand-held assist. BSC on the R  Fecal incontinent episode during transfer    Bedside Commode <> Bedside Chair Transfer " Stand Pivot technique: contact guard assistance with rolling walker. Chair on the R    Standing Balance:  Static Standing Balance: Fair- : requires minimal assistance or UE support in order to stand without LOB  Dynamic Standing Balance: Poor+ : able to stand with minimal assistance and reach ipsilaterally. Unable to weight shift.  Assistance Level Required: Contact Guard Assistance and Minimal Assistance  Patient used: hand-held assist and rolling walker   Time: ~2 min  Postural deviations noted: forward head  Comments: Pt improved in standing balance using RW. Pt maintained fair standing balance with RW for ~2 min during pericare.       Gait:   Patient ambulated: ~3 steps to BSC Min A HHA + ~3 steps to chair CGA RW  Gait Pattern observed: step to  Gait Deviation(s): unsteady gait, decreased step length, wide base of support, decreased foot clearance, and decreased osmar  Impairments due to: impaired balance, decreased strength, and decreased endurance  all lines remained intact throughout ambulation trial  Comments: Patient required cues for position in walker, step through gait pattern, stride length, upright posture, and width of base of support to increase independence and safety. Patient required cues ~ 50% of the time. Gait for transfers improved with use of RW for stability.    Education:  Time provided for education, counseling and discussion of health disposition in regards to patient's current status  All questions answered within PT scope of practice and to patient's satisfaction  PT role in POC to address current functional deficits  Pt educated on proper body mechanics, safety techniques, and energy conservation with PT facilitation and cueing throughout session  Call nursing/pct to transfer to chair/use bathroom. Pt stated understanding.  Whiteboard updated with therapist name and pt's current mobility status documented above  Safe to perform stand pivot transfer to/from chair/bedside commode  CGA-Min A and RW w/ nursing/PCT present  Importance of OOB tolerance ~2 hrs/day to improve lung ventilation and expansion as well as strengthen postural musculature    Patient left up in chair with all lines intact, call button in reach, RN notified, and son present.    Time Tracking:     PT Received On: 12/12/24  PT Start Time: 0955     PT Stop Time: 1040  PT Total Time (min): 45 min     Billable Minutes:   Therapeutic Activity 45 min    Treatment Type: Treatment  PT/PTA: PT       12/12/2024

## 2024-12-12 NOTE — ASSESSMENT & PLAN NOTE
Patient developed abdominal pain 2/2 constipation last week and NGT was placed. 11/30 NGT was removed. The day after the NGT was removed he developed constipation and dilated bowel loops on AXR.  Some BM after brown bomb enema.   CTM  Lactulose TID

## 2024-12-12 NOTE — ASSESSMENT & PLAN NOTE
Baseline creatinine is 1.5. May require dialysis long term.   Nephrology to trial intermittent HD 12/12  Avoid nephrotoxins and renally dose meds for GFR listed above  Monitor urine output, serial BMP, and adjust therapy as needed  Pending nephrology recs regarding tunneled cath and transitioning to HD

## 2024-12-12 NOTE — PROGRESS NOTES
Bedside HD initiated, good blood flows noted /. Uf net goal set for 2 liters as tolerated. Patient alert and stable before/P 98/51, pulse 80. O2 sat 100%. On room air

## 2024-12-12 NOTE — PROGRESS NOTES
[facility] - Medical ICU  Critical Care Medicine  Progress Note    Patient Name: Juan Carlos Yoo Sr.  MRN: [id number]  Admission Date: 11/20/2024  Hospital Length of Stay: 22 days  Code Status: Full Code  Attending Provider: Mu Bird MD  Primary Care Provider: Nyla Rios FNP   Principal Problem: Decompensated cirrhosis    Subjective:     HPI:  Juan Carlos Yoo is a 69 male with PMH of alcoholic cirrhosis, esophageal varices, Afib on eliquis, and HTN who presents for c/o worsening diffuse swelling as well as R arm pain/erythema. He was recently hospitalized 1 month ago at [facility] for volume overload in the setting of decompensated cirrhosis. He was treated with IV diuresis and discharged with adjustment to his diuretics, currently on lasix 60mg BID and metolazone 2.5mg every other week as per family. Patient reports diffuse swelling of his upper and lower extremities in addition to distended abdomen and worsening dyspnea, which he believes is due to recent medication adjustment. Family states he is non-compliant with low sodium diet and fluid restriction. Family states he has been compliant with diuretics, but rom't taken eliquis for 3 days due to issue getting refill. He also reports scratching right arm on door frame 3-4 days ago which has become red and painful after wrapping in in bandage. He claims to be compliant with medications, but is a poor historian. He follows with hepatology, not currently active on transplant list. He states he hasn't consumed alcohol for at least 9 months. Has c/o chills, but denies fever, cough, nausea, vomiting, chest pain, dysuria, hematuria, blood in stool. Workup in ED remarkable for VS: T 97.6 HR 94 RR 22 BP 95/56 O2 sat 97% on RA. Hb 6.7, MCV 75, Plt 81, PT/INR 22.6/2.2, Na 128, K 6.1, BUN/Cr 49/5.7, Alb 2.8, AST/ALT 35/9, Ammonia 104, , Trop neg, LA 2.9, CXR: Cardiac size is enlarged similar to prior.  No large volume of pleural fluid noted although there is mild blunting  of the right costophrenic angle which may relate to a small amount of pleural fluid.  Suspected right basilar opacity, to be correlated clinically for infection. RUE venous doppler: No thrombus in central veins of the right upper extremity. Patient received vanc/zosyn and lasix 80mg IVP in ED and will be admitted to hospital medicine service for further management.     Pt was admitted to hospital medicine. Blood cultures positive for Gram-positive cocci and Gram-negative rods. ID has been consulted. S/p 2 units PRBC for Hemoglobin dropped 6.8 on 11/21. Pt was on Eliquis for atrial fibrillation prior to admission which was held.  Hepatology following patient's clinical course, but are not evaluating him for transplant at this time. Diuretics were held because of concern for HRS.  Patient was started on albumin and midodrine per Nephrology recommendations for HRS.  Renal function continued to worsen and recommendation per Nephrology to transfer to ICU for maintaining goal map over 85 on Levophed.  ICU was notified and patient to be assessed to be transferred to ICU.       Hospital/ICU Course:  69 y.o. male with history of EtOH use disorder, EtOH cirrhosis, HCC s/p y90, morbid obesity BMI 44, HTN, and Afib who presents with severe anasarca and JAE.      Appreciate hepatology and nephrology recommendations.  Diuretics were held because of concern for HRS.  Patient was started on albumin and midodrine per Nephrology recommendations for HRS.  Renal function continued to worsen and recommendation per Nephrology to transfer to ICU for maintaining goal map over 85 on Levophed.  ICU was notified and patient to be assessed to be transferred to ICU.     Patient also has Gram-positive cocci and Gram-negative rods in his blood now.  Possible sources could be got translocation and barrier related infection through skin as he has been significantly edematous and has been oozing.  Has been on vancomycin and Rocephin.  Id was  consulted this morning.  Repeat blood cultures were obtained.     Continues to have anemia.  Hemoglobin dropped on 11/20 and was given 1 unit of packed red blood cells.  Did not respond appropriately.  Hemoglobin dropped again to 6.8 on 11/21 and was given 1 unit of packed red blood cells.  Hemoglobin again on 11/22 is 7.2.  No reported melena or hematochezia.  Patient was on Eliquis for atrial fibrillation prior to admission which was held.  Given history of esophageal varices and portal hypertensive gastropathy whereas also could be component of slow bleeding, under production due to CKD and hemolysis while also with decompensated cirrhosis.  Started on Protonix IV b.i.d. and octreotide.     Also clarified with hepatology.  Given patient's current infection we will await ID recommendations however we will be challenging to consider transplant evaluation at this time.  Trend clinical course     Goal clarification with the patient and family.  Patient at this time wants to be full code and continue with Levophed in ICU.  They would consider discussion with palliative care in a day or so if things do not get better.     In MICU patient started on Levophed, however titration remained difficult given tachycardic response from the patient.     11/24 Trialysis and arterial lines placed overnight and currently on levo and norepi. SLED today.    11/25 Boo dc. Increased midodrine. Continue steroids until d/c pressors. Discussed with Nephrology about our concern for sustained use of pressors to achieve their MAP goals of 85. Will follow up tomorrow.     11/26 Platelets 48. Repeat 38 confirming thrombocytopenia. Concern for HIT. D/c heparin. Increased lactulose dosing. Bladder scan revealed urine in bladder. Boo placed. Off vaso. Continuing levo. Maintain MAP goals 80. PT/OT consulted.     11/27 MAP goal 75-80. Heparin antibody result pending.     11/28 Pt with abdominal pain, decreased bowel movements, emesis. Lactic  acid 2.9 and repeat 2.7. Less concerned for mesenteric ischemia and more of a concern was for SBO. NG tube placed with subsequent suction of 500mL fluid. Abdomen less distended after placement and pt subsequently had bowel movement. MAP Goal of 60 with plan to come off pressors entirely and switch to dialysis after permacath, as he is not  liver transplant eligible at this time.     11/29 Pt with ileus. Clear liquids for now. Platelets 24. HIT versus zosyn induced? Peripheral smear sent. Zosyn changed to kaylah. Consent and transfuse 1U. GOC conversation today. Pt opting for long term dialysis.     11/30 naeon. Started back on heparin. One time dose of 120mg lasix today. Hold off SLED/SCUF today and give IV lasix 120 mg once; plan for SLED/SCUF tomorrow     12/1 Patient is becoming more dependent on dialysis. Not a current liver transplant candidate. Still complaining of abdominal pain after discontinuing the NGT. AXR with evidence of dilated bowel loops. Brown bomb administered. The days following administration of the brown bomb, patient had more frequent bowel movements and was able to pass flatulence. Constipation eventually resolved.   Given the need for pressor support, he was worked up for adrenal insufficiency which was positive. Consequently, he was started on steroids for adrenal insufficiency. His blood pressures improved, however, he still required pressor support, particularly during dialysis. Pressor support was eventually weaned off.   Patient was started on midodrine to help with BP. Nephrology trialed intermittent HD.     Interval History/Significant Events: NAEON. No dialysis overnight. Patient reports feeling better, he was able to sit in the chair. Endorses worsening edema in his lower extremities.     Denies HA, fevers, chills, chest pain, palpitations, SOB, abdominal pain, N/V.     Review of Systems  Objective:     Vital Signs (Most Recent):  Temp: 97.8 °F (36.6 °C) (12/12/24 1101)  Pulse: 83  (12/12/24 1201)  Resp: 17 (12/12/24 1201)  BP: (!) 113/54 (12/12/24 1201)  SpO2: 100 % (12/12/24 1201) Vital Signs (24h Range):  Temp:  [96.9 °F (36.1 °C)-98.3 °F (36.8 °C)] 97.8 °F (36.6 °C)  Pulse:  [71-88] 83  Resp:  [9-34] 17  SpO2:  [97 %-100 %] 100 %  BP: ()/(40-71) 113/54   Weight: (!) 141.1 kg (311 lb 1.1 oz)  Body mass index is 41.04 kg/m².      Intake/Output Summary (Last 24 hours) at 12/12/2024 1313  Last data filed at 12/12/2024 1212  Gross per 24 hour   Intake 710 ml   Output --   Net 710 ml          Physical Exam  Vitals and nursing note reviewed.   Constitutional:       General: He is awake. He is not in acute distress.     Appearance: He is obese. He is ill-appearing. He is not toxic-appearing.   HENT:      Head: Normocephalic and atraumatic.   Eyes:      Extraocular Movements: Extraocular movements intact.      Conjunctiva/sclera: Conjunctivae normal.   Cardiovascular:      Rate and Rhythm: Normal rate.   Pulmonary:      Effort: Pulmonary effort is normal. No respiratory distress.   Abdominal:      General: There is distension.      Palpations: Abdomen is soft.      Tenderness: There is no abdominal tenderness. There is no guarding or rebound.   Musculoskeletal:         General: No swelling or tenderness.      Right lower leg: Edema present.      Left lower leg: Edema present.      Comments: 2+ pitting edema in bilateral lower extremities   Skin:     General: Skin is warm.      Coloration: Skin is not jaundiced.      Findings: Bruising present.   Neurological:      Mental Status: He is alert and oriented to person, place, and time.      Motor: No weakness.            Vents:     Lines/Drains/Airways       Central Venous Catheter Line  Duration             Trialysis (Dialysis) Catheter 11/24/24 0441 right internal jugular 18 days              Drain  Duration             Male External Urinary Catheter 12/08/24 1532 3 days              Peripheral Intravenous Line  Duration                   Peripheral IV - Single Lumen 11/27/24 1101 20 G Anterior;Left Forearm 15 days                  Significant Labs:    CBC/Anemia Profile:  Recent Labs   Lab 12/11/24  0333 12/11/24  1044 12/12/24  0331   WBC 6.23 10.97 6.14   HGB 6.0* 7.2* 7.1*   HCT 19.4* 22.6* 21.5*   PLT 21* 24* 23*   MCV 80* 80* 81*   RDW 25.1* 24.6* 25.2*        Chemistries:  Recent Labs   Lab 12/10/24  2117 12/11/24  0333 12/12/24 0331   *  133* 134*  133*  133* 133*   K 3.7  3.7 3.7  3.7  3.7 3.3*     109 108  107  107 107   CO2 18*  18* 20*  19*  19* 18*   BUN 7*  7* 4*  5*  5* 12   CREATININE 1.7*  1.7* 1.2  1.2  1.2 2.7*   CALCIUM 7.5*  7.5* 7.2*  7.1*  7.1* 8.2*   ALBUMIN 2.2*  2.2* 2.0*  2.0*  2.0* 2.1*   PROT  --  4.8* 5.2*   BILITOT  --  4.3* 3.7*   ALKPHOS  --  67 71   ALT  --  15 14   AST  --  30 26   MG 1.8  1.8 1.9  1.9 1.9  1.9  1.9  1.9  1.9  1.9   PHOS 2.5*  2.5* 2.7  2.7  2.7 3.8       All pertinent labs within the past 24 hours have been reviewed.    Significant Imaging:  I have reviewed all pertinent imaging results/findings within the past 24 hours.    ABG  Recent Labs   Lab 12/10/24  0718   PH 7.253*  7.253*   PO2 33*  33*   PCO2 37.2  37.2   HCO3 16.4*  16.4*   BE -11*  -11*     Assessment/Plan:     Neuro  Encephalopathy, metabolic  Resolved  AAOx4  Will assess for signs of encephalopathy     Cardiac/Vascular  Atrial fibrillation  Holding home Eliquis in the setting of recent low blood counts  Continue metoprolol 12.5 mg BID    Renal/  Stage 3a chronic kidney disease  Nephrology following  Will try to remove more fluid  Strict intake and output  Renally dose all medications  Avoid nephrotoxic agents  Discuss with nephrology placement of tunneled line for HD    JAE (acute kidney injury)  Baseline creatinine is 1.5. May require dialysis long term.   Nephrology to trial intermittent HD 12/12  Avoid nephrotoxins and renally dose meds for GFR listed above  Monitor urine  output, serial BMP, and adjust therapy as needed  Pending nephrology recs regarding tunneled cath and transitioning to HD    ID  Gram-negative bacteremia  Blood cultures from admission with B. Diminuta, micrococcus luteus, lysinobacillus species. Seen by ID previously who recommended stopping antibiotics due to concern these were contaminants. Repeat blood cultures have been no growth. Has since been transferred to ICU with increased pressor requirement. Blood cultures repeated on 11/24 are no growth x 24 hours.   11/29 Switched from zosyn to meropenem due to concern of thrombocytopenia.   Finished meropenem course 12/8 @ 6 PM    Severe sepsis  This patient does have evidence of infective focus  My overall impression is sepsis.  Source: Abdominal    Organ dysfunction indicated by Acute kidney injury    Fluid challenge Contraindicated- Fluid bolus is contraindicated in this patient due to End Stage Liver Disease     Post- resuscitation assessment No - Post resuscitation assessment not needed     Source control achieved by: antibiotics  Patient finished course of abx (meropenem) 12/8    Hematology  Thrombocytopenia  Ongoing issue, could be related to ESLD or medication induced.   Daily CBC  Transfuse for PLT<10k, or PLT<50K with active bleeding  Monitor for signs and symptoms of bleeding  Peripheral blood smear to evaluate thrombocytopenia    Coagulopathy  End Stage Liver Disease precipitated coagulopathy  Heparin d/c d/t thrombocytopenia  CTM    Endocrine  Adrenal insufficiency  On going problem since admission, most likely multifactorial - component of liver dysfunction, HRS, sepsis on admission.  Continues to require levo, midodrine has been up titrated.   Random cortisol was 6   Persistent hypotension thought to be adrenal insufficiency.   Continue steroids  Consider endocrinology consult.      Hyponatremia  Likely dilutional secondary to end stage liver disease and HRS.   CTM, correct if appropriate    GI  *  Decompensated cirrhosis  MELD 3.0: 32 at 11/29/2024  9:57 PM  MELD-Na: 31 at 11/29/2024  9:57 PM  Calculated from:  Serum Creatinine: 3 mg/dL at 11/29/2024  9:57 PM  Serum Sodium: 132 mmol/L at 11/29/2024  9:57 PM  Total Bilirubin: 2.9 mg/dL at 11/29/2024 10:34 AM  Serum Albumin: 2.8 g/dL at 11/29/2024  9:57 PM  INR(ratio): 2 at 11/27/2024  2:09 AM  Age at listing (hypothetical): 69 years  Sex: Male at 11/29/2024  9:57 PM    Per hepatology, pt is not a transplant candidate at this time      Abdominal pain  Patient developed abdominal pain 2/2 constipation last week and NGT was placed. 11/30 NGT was removed. The day after the NGT was removed he developed constipation and dilated bowel loops on AXR.  Some BM after brown bomb enema.   CTM  Lactulose TID    Palliative Care  Palliative care encounter  Will coordinate GOC discussion    Goals of care, counseling/discussion  Advance Care Planning  Will plan to coordinate with hepatology and palliative.            Critical Care Daily Checklist:    A: Awake: RASS Goal/Actual Goal: (RETIRED) RASS Goal: 0-->alert and calm  Actual:     B: Spontaneous Breathing Trial Performed?     C: SAT & SBT Coordinated?                        D: Delirium: CAM-ICU (RETIRED) Overall CAM-ICU: Negative   E: Early Mobility Performed? Yes   F: Feeding Goal: Goals: to meet % of EEN/EPN by next RD f/u  Status: Nutrition Goal Status: goal not met   Current Diet Order   Procedures    Diet Renal Standard Tray     Order Specific Question:   Tray type:     Answer:   Standard Tray      AS: Analgesia/Sedation    T: Thromboembolic Prophylaxis No   H: HOB > 300 Yes   U: Stress Ulcer Prophylaxis (if needed) Yes   G: Glucose Control    B: Bowel Function Stool Occurrence: 1   I: Indwelling Catheter (Lines & Boo) Necessity Trialysis, PIV   D: De-escalation of Antimicrobials/Pharmacotherapies     Plan for the day/ETD Intermittent dialysis    Code Status:  Family/Goals of Care: Full Code         Critical  secondary to Patient has a condition that poses threat to life and bodily function: Acute Renal Failure      Critical care was time spent personally by me on the following activities: development of treatment plan with patient or surrogate and bedside caregivers, discussions with consultants, evaluation of patient's response to treatment, examination of patient, ordering and performing treatments and interventions, ordering and review of laboratory studies, ordering and review of radiographic studies, pulse oximetry, re-evaluation of patient's condition. This critical care time did not overlap with that of any other provider or involve time for any procedures.     Rhona Lewis, DO  Critical Care Medicine  [facility] - Medical ICU

## 2024-12-12 NOTE — PLAN OF CARE
MICU DAILY GOALS     Family/Goals of care/Code Status   Code Status: Full Code    24H Vital Sign Range  Temp:  [96.9 °F (36.1 °C)-98.3 °F (36.8 °C)]   Pulse:  [71-86]   Resp:  [9-34]   BP: ()/(40-71)   SpO2:  [100 %]      Shift Events (include procedures and significant events)   Pt worked with PT/ OT, ambulated and sat up in chair. HD ran for 4 hours, 2.5L removed. PRN midodrine given as ordered. Pt on room air. No other acute shift events.    AWAKE RASS: Goal - (RETIRED) RASS Goal: 0-->alert and calm  Actual - RASS (Pedraza Agitation-Sedation Scale): alert and calm    Restraint necessity: Not necessary   BREATHE SBT: Not intubated    Coordinate A & B, analgesics/sedatives Pain: managed   SAT: Not intubated   Delirium CAM-ICU: (RETIRED) Overall CAM-ICU: Negative   Early(intubated/ Progressive (non-intubated) Mobility MOVE Screen (INTUBATED ONLY): Not intubated    Activity: Activity Management: Arm raise - L1, Ambulated -L4   Feeding/Nutrition Diet order: Diet/Nutrition Received: low saturated fat/low cholesterol, Specialty Diet/Nutrition Received: renal diet   Thrombus DVT prophylaxis: VTE Core Measure: Per order contraindicated for SCDs/Anticoagulants   HOB Elevation Head of Bed (HOB) Positioning: HOB at 20-30 degrees   Ulcer Prophylaxis GI: yes   Glucose control managed Glycemic Management: blood glucose monitored   Skin Skin assessment:     Sacrum intact/not altered? Yes  Heels intact/not altered? Yes  Surgical wound? No    CHECK ONE!   (no altered skin or altered skin) and sub boxes:  [] No Altered Skin Integrity Present    []Prevention Measures Documented    [x] Altered Skin Integrity Present or Discovered   [x] LDA present in EPIC, daily doc completed              [] LDA added if not in EPIC (describe wound).                    When describing wound, do not stage, use descriptive words only.    [] Wound Image Taken (required on admit,                   transfer/discharge and every Tuesday)    Wound  Care Consulted? Yes   Bowel Function no issues    Indwelling Catheter Necessity [REMOVED]      Urethral Catheter 11/26/24 1738-Reason for Continuing Urinary Catheterization: Urinary retention  [REMOVED]      Urethral Catheter 12/03/24 1607 Silicone 16 Fr.-Reason for Continuing Urinary Catheterization: Critically ill in ICU and requiring hourly monitoring of intake/output  [REMOVED]      Urethral Catheter 11/20/24 1802 Double-lumen-Reason for Continuing Urinary Catheterization: Urinary retention    Trialysis (Dialysis) Catheter 11/24/24 0441 right internal jugular-Line Necessity Review: CRRT/HD     De-escalation Antibiotics N/A        VS and assessment per flow sheet, patient progressing towards goals as tolerated, plan of care reviewed with  Juan Carlos Yoo and family , all concerns addressed, will continue to monitor.

## 2024-12-12 NOTE — PROGRESS NOTES
[facility] - [facility]  Adult Nutrition  Progress Note    SUMMARY       Recommendations    1.) Recommend to liberalize the diet to Low Na to help increase PO intake.      - RN staff: please continue to document PO intake.     2.) Recommend to discontinue Boost Breeze, provide Novasource Renal TID to help meet increased needs.      - may change to Boost Plus TID if pt does not  like    3.) RD to monitor wt, labs, skin, PO intake.     Goals: to meet % of EEN/EPN by next RD f/u  Nutrition Goal Status: goal not met  Communication of RD Recs:  (POC)    Assessment and Plan    Endocrine  Moderate protein-calorie malnutrition  Malnutrition Type:  Context: acute illness or injury  Level: moderate    Related to (etiology):   Inadequate protein- calorie intake    Signs and Symptoms (as evidenced by):   Mild muscle- fat wasting, moderate to severe edema present, and poor PO intake     Malnutrition Characteristic Summary:  Energy Intake (Malnutrition): less than 75% for greater than 7 days  Subcutaneous Fat (Malnutrition): mild depletion  Muscle Mass (Malnutrition): mild depletion  Fluid Accumulation (Malnutrition): moderate to severe    Interventions/Recommendations (treatment strategy):  Collaboration of nutritional care with other providers.   ONS    Nutrition Diagnosis Status:   New       Malnutrition Assessment  Malnutrition Context: acute illness or injury  Malnutrition Level: moderate          Energy Intake (Malnutrition): less than 75% for greater than 7 days  Subcutaneous Fat (Malnutrition): mild depletion  Muscle Mass (Malnutrition): mild depletion  Fluid Accumulation (Malnutrition): moderate to severe   Orbital Region (Subcutaneous Fat Loss): mild depletion  Upper Arm Region (Subcutaneous Fat Loss): mild depletion   Buddhism Region (Muscle Loss): mild depletion  Clavicle Bone Region (Muscle Loss): mild depletion  Clavicle and Acromion Bone Region (Muscle Loss): mild depletion  Scapular Bone Region (Muscle Loss):  "mild depletion  Dorsal Hand (Muscle Loss): well nourished  Patellar Region (Muscle Loss):  (too much fluid)  Anterior Thigh Region (Muscle Loss):  (too much fluid)  Posterior Calf Region (Muscle Loss):  (too much fluid)     Reason for Assessment    Reason For Assessment: RD follow-up  Diagnosis: liver disease (Decompensated cirrhosis)    General Information Comments: RD f/u: Pt denies n/v/c. Pt stated that they had a an episode of vomiting. Pt stated that PO intake is around 50%. Pt did endorse that their appetite has been slowly improving. Pt remains anuric, requiring CRRT. Moderate to severe edema present. RD was able to perform NFPE: RD feels pt meets the criteria for moderate malnutrition in the context of acute illness. Please review PES for details. RD team to continue to monitor and f/u.     Nutrition Discharge Planning: Regular Diet, fluid per MD    Nutrition Related Social Determinants of Health: SDOH: None Identified     Nutrition Risk Screen    Nutrition Risk Screen: no indicators present    Nutrition/Diet History    Patient Reported Diet/Restrictions/Preferences: general  Typical Food/Fluid Intake: 2 meals/day + snacks  Spiritual, Cultural Beliefs, Mandaeism Practices, Values that Affect Care: no  Food Allergies: NKFA    Anthropometrics    Temp: 97.8 °F (36.6 °C)  Height Method: Stated  Height: 6' 1" (185.4 cm)  Height (inches): 73 in  Weight Method: Bed Scale  Weight: (!) 141.1 kg (311 lb 1.1 oz)  Weight (lb): (!) 311.07 lb  Ideal Body Weight (IBW), Male: 184 lb  % Ideal Body Weight, Male (lb): 183.32 %  BMI (Calculated): 41    Lab/Procedures/Meds    Pertinent Labs Reviewed: reviewed  Pertinent Labs Comments: Na: 133, K: 3.3, Cr: 2.7, GFR: 24.4, gluc: 114,  PRO: 5.2, tbili: 3.7    Pertinent Medications Reviewed: reviewed  Pertinent Medications Comments: Famotidine, lactulose, K Bicarb    Estimated/Assessed Needs    Weight Used For Calorie Calculations: (!) 141.1 kg (311 lb 1.1 oz)  Energy Calorie " Requirements (kcal): 2220 kcal  Energy Need Method: Pala-St Gosia    Protein Requirements: 167- 184 (2.0-2.2g/kg of IBW)  Weight Used For Protein Calculations: 83.5 kg (184 lb) (IBW d/t BMI >40.0)    Fluid Requirements (mL): as per MD  RDA Method (mL): 2220    Nutrition Prescription Ordered    Current Diet Order: Renal  Oral Nutrition Supplement: Boost Breeze TID    Evaluation of Received Nutrient/Fluid Intake    I/O: -6.4 L since admit  Energy Calories Required: not meeting needs  Protein Required: not meeting needs  Fluid Required:  (as per MD)  Comments: LBM 12/12  Tolerance: not tolerating (n/v today)  % Intake of Estimated Energy Needs: 0 - 25 %  % Meal Intake: Other: <50%    Nutrition Risk    Level of Risk/Frequency of Follow-up: low - moderate     Monitor and Evaluation    Food and Nutrient Intake: food and beverage intake  Food and Nutrient Adminstration: diet order  Knowledge/Beliefs/Attitudes: food and nutrition knowledge/skill, beliefs and attitudes  Physical Activity and Function: nutrition-related ADLs and IADLs  Anthropometric Measurements: weight, weight change, body mass index  Biochemical Data, Medical Tests and Procedures: electrolyte and renal panel, gastrointestinal profile, glucose/endocrine profile, lipid profile, inflammatory profile  Nutrition-Focused Physical Findings: overall appearance, skin     Nutrition Follow-Up    RD Follow-up?: Yes

## 2024-12-13 LAB
ALBUMIN SERPL BCP-MCNC: 2.2 G/DL (ref 3.5–5.2)
ALBUMIN SERPL BCP-MCNC: 2.3 G/DL (ref 3.5–5.2)
ALBUMIN SERPL BCP-MCNC: 2.3 G/DL (ref 3.5–5.2)
ALP SERPL-CCNC: 71 U/L (ref 40–150)
ALT SERPL W/O P-5'-P-CCNC: 15 U/L (ref 10–44)
ANION GAP SERPL CALC-SCNC: 7 MMOL/L (ref 8–16)
ANION GAP SERPL CALC-SCNC: 8 MMOL/L (ref 8–16)
ANION GAP SERPL CALC-SCNC: 9 MMOL/L (ref 8–16)
ANION GAP SERPL CALC-SCNC: 9 MMOL/L (ref 8–16)
AST SERPL-CCNC: 24 U/L (ref 10–40)
BASOPHILS # BLD AUTO: 0.04 K/UL (ref 0–0.2)
BASOPHILS NFR BLD: 0.5 % (ref 0–1.9)
BILIRUB SERPL-MCNC: 2.9 MG/DL (ref 0.1–1)
BUN SERPL-MCNC: 10 MG/DL (ref 8–23)
BUN SERPL-MCNC: 12 MG/DL (ref 8–23)
BUN SERPL-MCNC: 12 MG/DL (ref 8–23)
CALCIUM SERPL-MCNC: 8.3 MG/DL (ref 8.7–10.5)
CALCIUM SERPL-MCNC: 8.4 MG/DL (ref 8.7–10.5)
CHLORIDE SERPL-SCNC: 105 MMOL/L (ref 95–110)
CO2 SERPL-SCNC: 16 MMOL/L (ref 23–29)
CO2 SERPL-SCNC: 16 MMOL/L (ref 23–29)
CO2 SERPL-SCNC: 19 MMOL/L (ref 23–29)
CREAT SERPL-MCNC: 2.5 MG/DL (ref 0.5–1.4)
CREAT SERPL-MCNC: 2.9 MG/DL (ref 0.5–1.4)
CREAT SERPL-MCNC: 2.9 MG/DL (ref 0.5–1.4)
DIFFERENTIAL METHOD BLD: ABNORMAL
EOSINOPHIL # BLD AUTO: 0.1 K/UL (ref 0–0.5)
EOSINOPHIL NFR BLD: 0.8 % (ref 0–8)
ERYTHROCYTE [DISTWIDTH] IN BLOOD BY AUTOMATED COUNT: 25.5 % (ref 11.5–14.5)
EST. GFR  (NO RACE VARIABLE): 22.4 ML/MIN/1.73 M^2
EST. GFR  (NO RACE VARIABLE): 22.4 ML/MIN/1.73 M^2
EST. GFR  (NO RACE VARIABLE): 26.8 ML/MIN/1.73 M^2
GLUCOSE SERPL-MCNC: 112 MG/DL (ref 70–110)
GLUCOSE SERPL-MCNC: 115 MG/DL (ref 70–110)
GLUCOSE SERPL-MCNC: 130 MG/DL (ref 70–110)
GLUCOSE SERPL-MCNC: 130 MG/DL (ref 70–110)
HCT VFR BLD AUTO: 22 % (ref 40–54)
HGB BLD-MCNC: 7.5 G/DL (ref 14–18)
IMM GRANULOCYTES # BLD AUTO: 0.03 K/UL (ref 0–0.04)
IMM GRANULOCYTES NFR BLD AUTO: 0.4 % (ref 0–0.5)
INR PPP: 1.9 (ref 0.8–1.2)
LYMPHOCYTES # BLD AUTO: 0.7 K/UL (ref 1–4.8)
LYMPHOCYTES NFR BLD: 9.2 % (ref 18–48)
MAGNESIUM SERPL-MCNC: 1.9 MG/DL (ref 1.6–2.6)
MAGNESIUM SERPL-MCNC: 2 MG/DL (ref 1.6–2.6)
MAGNESIUM SERPL-MCNC: 2 MG/DL (ref 1.6–2.6)
MCH RBC QN AUTO: 27 PG (ref 27–31)
MCHC RBC AUTO-ENTMCNC: 34.1 G/DL (ref 32–36)
MCV RBC AUTO: 79 FL (ref 82–98)
MONOCYTES # BLD AUTO: 1 K/UL (ref 0.3–1)
MONOCYTES NFR BLD: 13.4 % (ref 4–15)
NEUTROPHILS # BLD AUTO: 5.6 K/UL (ref 1.8–7.7)
NEUTROPHILS NFR BLD: 75.7 % (ref 38–73)
NRBC BLD-RTO: 0 /100 WBC
PHOSPHATE SERPL-MCNC: 2.8 MG/DL (ref 2.7–4.5)
PHOSPHATE SERPL-MCNC: 2.8 MG/DL (ref 2.7–4.5)
PHOSPHATE SERPL-MCNC: 3.2 MG/DL (ref 2.7–4.5)
PHOSPHATE SERPL-MCNC: 3.3 MG/DL (ref 2.7–4.5)
PLATELET # BLD AUTO: 29 K/UL (ref 150–450)
PMV BLD AUTO: ABNORMAL FL (ref 9.2–12.9)
POTASSIUM SERPL-SCNC: 3.4 MMOL/L (ref 3.5–5.1)
POTASSIUM SERPL-SCNC: 3.4 MMOL/L (ref 3.5–5.1)
POTASSIUM SERPL-SCNC: 3.6 MMOL/L (ref 3.5–5.1)
PROT SERPL-MCNC: 5.4 G/DL (ref 6–8.4)
PROTHROMBIN TIME: 19.9 SEC (ref 9–12.5)
RBC # BLD AUTO: 2.78 M/UL (ref 4.6–6.2)
SODIUM SERPL-SCNC: 130 MMOL/L (ref 136–145)
SODIUM SERPL-SCNC: 130 MMOL/L (ref 136–145)
SODIUM SERPL-SCNC: 131 MMOL/L (ref 136–145)
SODIUM SERPL-SCNC: 132 MMOL/L (ref 136–145)
WBC # BLD AUTO: 7.38 K/UL (ref 3.9–12.7)

## 2024-12-13 PROCEDURE — 85610 PROTHROMBIN TIME: CPT | Performed by: STUDENT IN AN ORGANIZED HEALTH CARE EDUCATION/TRAINING PROGRAM

## 2024-12-13 PROCEDURE — 80069 RENAL FUNCTION PANEL: CPT | Mod: 91 | Performed by: STUDENT IN AN ORGANIZED HEALTH CARE EDUCATION/TRAINING PROGRAM

## 2024-12-13 PROCEDURE — 97535 SELF CARE MNGMENT TRAINING: CPT

## 2024-12-13 PROCEDURE — 94761 N-INVAS EAR/PLS OXIMETRY MLT: CPT

## 2024-12-13 PROCEDURE — 83735 ASSAY OF MAGNESIUM: CPT | Mod: 91 | Performed by: STUDENT IN AN ORGANIZED HEALTH CARE EDUCATION/TRAINING PROGRAM

## 2024-12-13 PROCEDURE — 80053 COMPREHEN METABOLIC PANEL: CPT

## 2024-12-13 PROCEDURE — 25000003 PHARM REV CODE 250

## 2024-12-13 PROCEDURE — 83735 ASSAY OF MAGNESIUM: CPT | Performed by: STUDENT IN AN ORGANIZED HEALTH CARE EDUCATION/TRAINING PROGRAM

## 2024-12-13 PROCEDURE — 97530 THERAPEUTIC ACTIVITIES: CPT

## 2024-12-13 PROCEDURE — 84100 ASSAY OF PHOSPHORUS: CPT

## 2024-12-13 PROCEDURE — 25000003 PHARM REV CODE 250: Performed by: INTERNAL MEDICINE

## 2024-12-13 PROCEDURE — 20600001 HC STEP DOWN PRIVATE ROOM

## 2024-12-13 PROCEDURE — 85025 COMPLETE CBC W/AUTO DIFF WBC: CPT

## 2024-12-13 PROCEDURE — 99233 SBSQ HOSP IP/OBS HIGH 50: CPT | Mod: ,,, | Performed by: INTERNAL MEDICINE

## 2024-12-13 PROCEDURE — 80069 RENAL FUNCTION PANEL: CPT | Performed by: STUDENT IN AN ORGANIZED HEALTH CARE EDUCATION/TRAINING PROGRAM

## 2024-12-13 RX ORDER — MIDODRINE HYDROCHLORIDE 5 MG/1
20 TABLET ORAL DAILY PRN
Status: DISCONTINUED | OUTPATIENT
Start: 2024-12-13 | End: 2024-12-31 | Stop reason: HOSPADM

## 2024-12-13 RX ADMIN — METOPROLOL TARTRATE 12.5 MG: 25 TABLET, FILM COATED ORAL at 08:12

## 2024-12-13 RX ADMIN — MIDODRINE HYDROCHLORIDE 20 MG: 5 TABLET ORAL at 02:12

## 2024-12-13 RX ADMIN — LACTULOSE 30 G: 20 SOLUTION ORAL at 09:12

## 2024-12-13 RX ADMIN — LACTULOSE 30 G: 20 SOLUTION ORAL at 08:12

## 2024-12-13 RX ADMIN — HYDROCORTISONE 15 MG: 5 TABLET ORAL at 05:12

## 2024-12-13 RX ADMIN — HYDROCORTISONE 10 MG: 5 TABLET ORAL at 02:12

## 2024-12-13 RX ADMIN — TAMSULOSIN HYDROCHLORIDE 0.4 MG: 0.4 CAPSULE ORAL at 09:12

## 2024-12-13 RX ADMIN — LACTULOSE 30 G: 20 SOLUTION ORAL at 02:12

## 2024-12-13 RX ADMIN — MIDODRINE HYDROCHLORIDE 20 MG: 5 TABLET ORAL at 09:12

## 2024-12-13 RX ADMIN — METOPROLOL TARTRATE 12.5 MG: 25 TABLET, FILM COATED ORAL at 09:12

## 2024-12-13 RX ADMIN — MIDODRINE HYDROCHLORIDE 20 MG: 5 TABLET ORAL at 05:12

## 2024-12-13 NOTE — PROGRESS NOTES
[facility] - [facility] Step[facility]  Nephrology  Progress Note    Patient Name: Juan Carlos Yoo Sr.  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 Recent admission 10/4 at [facility] for decompensated hepatic cirrhosis where he was discharged with oral diuretic regimen including furosemide 60 mg BID and metolazone 2.5 mg daily, low salt diet with fluid restriction. It is unclear if patient is adherent to these medications or lifestyle modifications. W/u notable for anemia with hb 6.7 s/p 1 unit pRBC 11/20 and JAE on CKD w elevated BUN 49/creatinine 5.9, hyperkalemia (K 6.1>5.1 after shifting), bicarb 18, elevated lactate up to 3.5. Nephrology consulted for JAE with c/o HRS    Interval History:     ARLET YING, status post iHD, 2500 mL of fluid removed, tolerated procedure well, blood pressure 100/55, map 72,    Review of patient's allergies indicates:  No Known Allergies  Current Facility-Administered Medications   Medication Frequency    albuterol-ipratropium 2.5 mg-0.5 mg/3 mL nebulizer solution 3 mL Q6H PRN    aluminum-magnesium hydroxide-simethicone 200-200-20 mg/5 mL suspension 30 mL QID PRN    dextrose 10% bolus 125 mL 125 mL PRN    dextrose 10% bolus 250 mL 250 mL PRN    famotidine tablet 20 mg Every other day    glucagon (human recombinant) injection 1 mg PRN    glucose chewable tablet 16 g PRN    glucose chewable tablet 24 g PRN    hydrocortisone tablet 15 mg Before breakfast    And    hydrocortisone tablet 10 mg  Daily    lactulose 20 gram/30 mL solution Soln 30 g TID    melatonin tablet 6 mg Nightly PRN    metoprolol tartrate (LOPRESSOR) split tablet 12.5 mg BID    midodrine tablet 20 mg Q8H    midodrine tablet 20 mg Daily PRN    naloxone 0.4 mg/mL injection 0.02 mg PRN    ondansetron injection 4 mg Q8H PRN    simethicone chewable tablet 80 mg QID PRN    sodium chloride 0.9% flush 10 mL PRN    tamsulosin 24 hr capsule 0.4 mg QHS       Objective:     Vital Signs (Most Recent):  Temp: 97.6 °F (36.4 °C) (12/13/24 1101)  Pulse: 81 (12/13/24 1501)  Resp: (!) 22 (12/13/24 1501)  BP: (!) 104/58 (12/13/24 1501)  SpO2: 100 % (12/13/24 1501) Vital Signs (24h Range):  Temp:  [97.6 °F (36.4 °C)-97.8 °F (36.6 °C)] 97.6 °F (36.4 °C)  Pulse:  [71-88] 81  Resp:  [8-24] 22  SpO2:  [99 %-100 %] 100 %  BP: ()/(50-65) 104/58     Weight: (!) 137.8 kg (303 lb 12.7 oz) (12/13/24 0400)  Body mass index is 40.08 kg/m².  Body surface area is 2.66 meters squared.    I/O last 3 completed shifts:  In: 610 [P.O.:360; Other:250]  Out: 3000 [Other:3000]     Physical Exam  Constitutional:       General: He is not in acute distress.     Appearance: He is ill-appearing. He is not toxic-appearing.   HENT:      Head: Normocephalic and atraumatic.   Eyes:      Extraocular Movements: Extraocular movements intact.      Pupils: Pupils are equal, round, and reactive to light.   Cardiovascular:      Rate and Rhythm: Normal rate.   Musculoskeletal:      Right lower leg: Edema present.      Left lower leg: Edema present.   Skin:     Coloration: Skin is pale. Skin is not jaundiced.   Neurological:      Mental Status: He is oriented to person, place, and time.   Psychiatric:         Behavior: Behavior normal.          Significant Labs:  CBC:   Recent Labs   Lab 12/13/24 0258   WBC 7.38   RBC 2.78*   HGB 7.5*   HCT 22.0*   PLT 29*   MCV 79*   MCH 27.0   MCHC 34.1     CMP:   Recent Labs   Lab 12/13/24 0258 12/13/24  1406   *  112*  112*  112*  112* 130*   130*   CALCIUM 8.4*  8.3*  8.3*  8.3*  8.3* 8.4*  8.4*   ALBUMIN 2.2*  2.2*  2.2*  2.2*  2.2* 2.3*  2.3*   PROT 5.4*  --    *  131*  131*  131*  131* 130*  130*   K 3.6  3.6  3.6  3.6  3.6 3.4*  3.4*   CO2 19*  19*  19*  19*  19* 16*  16*     105  105  105  105 105  105   BUN 10  10  10  10  10 12  12   CREATININE 2.5*  2.5*  2.5*  2.5*  2.5* 2.9*  2.9*   ALKPHOS 71  --    ALT 15  --    AST 24  --    BILITOT 2.9*  --      LFTs:   Recent Labs   Lab 12/13/24  0258 12/13/24  1406   ALT 15  --    AST 24  --    ALKPHOS 71  --    BILITOT 2.9*  --    PROT 5.4*  --    ALBUMIN 2.2*  2.2*  2.2*  2.2*  2.2* 2.3*  2.3*       Assessment/Plan:     Renal/  JAE (acute kidney injury)      JAE is likely due to pre-renal azotemia due to intravascular volume depletion secondary to decompensated hepatic cirrhosis with volume overload and acute tubular necrosis caused by hemodynamic instability, renal hypoperfusion, severe sepsis with GNR bacteremia. Initial presentation concerning for hepatorenal syndrome, transferred to MICU for vasopressors without success in reaching MAP goal 85-90 for treatment of HRS. Currently, patient with oligouric JAE more contributed by ATN as above.     Recommendations:      - trial of of intermittent hemodialysis yesterday, tolerated the procedure well  - precautions to prevent hypotension, premedicate with midodrine 20 mg, use high calcium bath, ultrafiltration modeling, cool dialysate  - plan to replace his Trialysis line (19-day-old) to tunnel dialysis line, consult for intervention nephrology placed, team NPO Sunday midnight, obtain PT, PTT, INR  - Map ranges from (low 50s to mid 60s) not on pressors  -  Recommend goals of care discussion regarding current prognosis and liver transplant candidacy   -Avoid nephrotoxins and renally dose meds for GFR listed above  -Monitor urine output, serial BMP, and adjust therapy as needed  -Hemodialysis  consent obtained & placed in patient chart        Thank you for your consult. I will follow-up with patient. Please contact us if you have any additional questions.    Irma Hernandez MD  Nephrology  [facility] - Transplant Stepdown    ATTENDING PHYSICIAN ATTESTATION  I have personally verified the history and examined the patient. I thoroughly reviewed the demographic, clinical, laboratorial and imaging information available in medical records. I agree with the assessment and recommendations provided by the subspecialty resident who was under my supervision.

## 2024-12-13 NOTE — PROGRESS NOTES
[facility]  Critical Care Medicine  Progress Note    Patient Name: Juan Carlos Yoo Sr.  MRN: [id number]  Admission Date: 11/20/2024  Hospital Length of Stay: 23 days  Code Status: Full Code  Attending Provider: Mu Bird MD  Primary Care Provider: Nyla Rios FNP   Principal Problem: Decompensated cirrhosis    Subjective:     HPI:  Juan Carlos Yoo is a 69 male with PMH of alcoholic cirrhosis, esophageal varices, Afib on eliquis, and HTN who presents for c/o worsening diffuse swelling as well as R arm pain/erythema. He was recently hospitalized 1 month ago at [facility] for volume overload in the setting of decompensated cirrhosis. He was treated with IV diuresis and discharged with adjustment to his diuretics, currently on lasix 60mg BID and metolazone 2.5mg every other week as per family. Patient reports diffuse swelling of his upper and lower extremities in addition to distended abdomen and worsening dyspnea, which he believes is due to recent medication adjustment. Family states he is non-compliant with low sodium diet and fluid restriction. Family states he has been compliant with diuretics, but rom't taken eliquis for 3 days due to issue getting refill. He also reports scratching right arm on door frame 3-4 days ago which has become red and painful after wrapping in in bandage. He claims to be compliant with medications, but is a poor historian. He follows with hepatology, not currently active on transplant list. He states he hasn't consumed alcohol for at least 9 months. Has c/o chills, but denies fever, cough, nausea, vomiting, chest pain, dysuria, hematuria, blood in stool. Workup in ED remarkable for VS: T 97.6 HR 94 RR 22 BP 95/56 O2 sat 97% on RA. Hb 6.7, MCV 75, Plt 81, PT/INR 22.6/2.2, Na 128, K 6.1, BUN/Cr 49/5.7, Alb 2.8, AST/ALT 35/9, Ammonia 104, , Trop neg, LA 2.9, CXR: Cardiac size is enlarged similar to prior.  No large volume of pleural fluid noted although there is mild blunting  of the right costophrenic angle which may relate to a small amount of pleural fluid.  Suspected right basilar opacity, to be correlated clinically for infection. RUE venous doppler: No thrombus in central veins of the right upper extremity. Patient received vanc/zosyn and lasix 80mg IVP in ED and will be admitted to hospital medicine service for further management.     Pt was admitted to hospital medicine. Blood cultures positive for Gram-positive cocci and Gram-negative rods. ID has been consulted. S/p 2 units PRBC for Hemoglobin dropped 6.8 on 11/21. Pt was on Eliquis for atrial fibrillation prior to admission which was held.  Hepatology following patient's clinical course, but are not evaluating him for transplant at this time. Diuretics were held because of concern for HRS.  Patient was started on albumin and midodrine per Nephrology recommendations for HRS.  Renal function continued to worsen and recommendation per Nephrology to transfer to ICU for maintaining goal map over 85 on Levophed.  ICU was notified and patient to be assessed to be transferred to ICU.       Hospital/ICU Course:  69 y.o. male with history of EtOH use disorder, EtOH cirrhosis, HCC s/p y90, morbid obesity BMI 44, HTN, and Afib who presents with severe anasarca and JAE.      Appreciate hepatology and nephrology recommendations.  Diuretics were held because of concern for HRS.  Patient was started on albumin and midodrine per Nephrology recommendations for HRS.  Renal function continued to worsen and recommendation per Nephrology to transfer to ICU for maintaining goal map over 85 on Levophed.  ICU was notified and patient to be assessed to be transferred to ICU.     Patient also has Gram-positive cocci and Gram-negative rods in his blood now.  Possible sources could be got translocation and barrier related infection through skin as he has been significantly edematous and has been oozing.  Has been on vancomycin and Rocephin.  Id was  consulted this morning.  Repeat blood cultures were obtained.     Continues to have anemia.  Hemoglobin dropped on 11/20 and was given 1 unit of packed red blood cells.  Did not respond appropriately.  Hemoglobin dropped again to 6.8 on 11/21 and was given 1 unit of packed red blood cells.  Hemoglobin again on 11/22 is 7.2.  No reported melena or hematochezia.  Patient was on Eliquis for atrial fibrillation prior to admission which was held.  Given history of esophageal varices and portal hypertensive gastropathy whereas also could be component of slow bleeding, under production due to CKD and hemolysis while also with decompensated cirrhosis.  Started on Protonix IV b.i.d. and octreotide.     Also clarified with hepatology.  Given patient's current infection we will await ID recommendations however we will be challenging to consider transplant evaluation at this time.  Trend clinical course     Goal clarification with the patient and family.  Patient at this time wants to be full code and continue with Levophed in ICU.  They would consider discussion with palliative care in a day or so if things do not get better.     In MICU patient started on Levophed, however titration remained difficult given tachycardic response from the patient.     11/24 Trialysis and arterial lines placed overnight and currently on levo and norepi. SLED today.    11/25 Boo dc. Increased midodrine. Continue steroids until d/c pressors. Discussed with Nephrology about our concern for sustained use of pressors to achieve their MAP goals of 85. Will follow up tomorrow.     11/26 Platelets 48. Repeat 38 confirming thrombocytopenia. Concern for HIT. D/c heparin. Increased lactulose dosing. Bladder scan revealed urine in bladder. Boo placed. Off vaso. Continuing levo. Maintain MAP goals 80. PT/OT consulted.     11/27 MAP goal 75-80. Heparin antibody result pending.     11/28 Pt with abdominal pain, decreased bowel movements, emesis. Lactic  acid 2.9 and repeat 2.7. Less concerned for mesenteric ischemia and more of a concern was for SBO. NG tube placed with subsequent suction of 500mL fluid. Abdomen less distended after placement and pt subsequently had bowel movement. MAP Goal of 60 with plan to come off pressors entirely and switch to dialysis after permacath, as he is not  liver transplant eligible at this time.     11/29 Pt with ileus. Clear liquids for now. Platelets 24. HIT versus zosyn induced? Peripheral smear sent. Zosyn changed to kaylah. Consent and transfuse 1U. GOC conversation today. Pt opting for long term dialysis.     11/30 naeon. Started back on heparin. One time dose of 120mg lasix today. Hold off SLED/SCUF today and give IV lasix 120 mg once; plan for SLED/SCUF tomorrow     12/1 Patient is becoming more dependent on dialysis. Not a current liver transplant candidate. Still complaining of abdominal pain after discontinuing the NGT. AXR with evidence of dilated bowel loops. Brown bomb administered. The days following administration of the brown bomb, patient had more frequent bowel movements and was able to pass flatulence. Constipation eventually resolved.   Given the need for pressor support, he was worked up for adrenal insufficiency which was positive. Consequently, he was started on steroids for adrenal insufficiency. His blood pressures improved, however, he still required pressor support, particularly during dialysis. Pressor support was eventually weaned off.   Patient was started on midodrine to help with BP. Nephrology trialed intermittent HD and patient seemed to tolerate it well. Nephrology recommended placement of tunneled catheter for HD, interventional nephrology was consulted. Patient was medically stable for step down to the hospital medicine floors.     Interval History/Significant Events: Patient had  intermittent HD in the evening, appeared to tolerate it without any immediate complications. Patient denies any new  concerns at this time.     Denies HA, fevers, chills, chest pain, abdominal pain, N/V.    Review of Systems  Objective:     Vital Signs (Most Recent):  Temp: 97.6 °F (36.4 °C) (12/13/24 0701)  Pulse: 74 (12/13/24 0857)  Resp: 12 (12/13/24 0701)  BP: (!) 104/52 (12/13/24 0857)  SpO2: 100 % (12/13/24 0701) Vital Signs (24h Range):  Temp:  [97.6 °F (36.4 °C)-97.8 °F (36.6 °C)] 97.6 °F (36.4 °C)  Pulse:  [71-88] 74  Resp:  [8-23] 12  SpO2:  [100 %] 100 %  BP: ()/(50-65) 104/52   Weight: (!) 137.8 kg (303 lb 12.7 oz)  Body mass index is 40.08 kg/m².      Intake/Output Summary (Last 24 hours) at 12/13/2024 0933  Last data filed at 12/12/2024 1830  Gross per 24 hour   Intake 370 ml   Output 3000 ml   Net -2630 ml          Physical Exam  Vitals and nursing note reviewed.   Constitutional:       General: He is awake. He is not in acute distress.     Appearance: He is obese. He is ill-appearing. He is not toxic-appearing.   HENT:      Head: Normocephalic and atraumatic.   Eyes:      Extraocular Movements: Extraocular movements intact.      Conjunctiva/sclera: Conjunctivae normal.   Cardiovascular:      Rate and Rhythm: Normal rate.   Pulmonary:      Effort: Pulmonary effort is normal. No respiratory distress.   Abdominal:      General: There is distension.      Palpations: Abdomen is soft.      Tenderness: There is no abdominal tenderness. There is no guarding or rebound.   Musculoskeletal:         General: No swelling or tenderness.      Right lower leg: Edema present.      Left lower leg: Edema present.      Comments: 2+ pitting edema in bilateral lower extremities   Skin:     General: Skin is warm.      Coloration: Skin is not jaundiced.      Findings: Bruising present.   Neurological:      Mental Status: He is alert and oriented to person, place, and time.      Motor: No weakness.            Vents:     Lines/Drains/Airways       Central Venous Catheter Line  Duration             Trialysis (Dialysis) Catheter  11/24/24 0441 right internal jugular 19 days              Drain  Duration             Male External Urinary Catheter 12/08/24 1532 4 days              Peripheral Intravenous Line  Duration                  Peripheral IV - Single Lumen 11/27/24 1101 20 G Anterior;Left Forearm 15 days                  Significant Labs:    CBC/Anemia Profile:  Recent Labs   Lab 12/11/24  1044 12/12/24  0331 12/13/24  0258   WBC 10.97 6.14 7.38   HGB 7.2* 7.1* 7.5*   HCT 22.6* 21.5* 22.0*   PLT 24* 23* 29*   MCV 80* 81* 79*   RDW 24.6* 25.2* 25.5*        Chemistries:  Recent Labs   Lab 12/12/24  0331 12/12/24  1435 12/13/24  0258   * 131*  131* 132*  131*  131*  131*  131*   K 3.3* 3.6  3.6 3.6  3.6  3.6  3.6  3.6    105  105 105  105  105  105  105   CO2 18* 18*  18* 19*  19*  19*  19*  19*   BUN 12 14  14 10  10  10  10  10   CREATININE 2.7* 2.9*  2.9* 2.5*  2.5*  2.5*  2.5*  2.5*   CALCIUM 8.2* 8.5*  8.5* 8.4*  8.3*  8.3*  8.3*  8.3*   ALBUMIN 2.1* 2.2*  2.2* 2.2*  2.2*  2.2*  2.2*  2.2*   PROT 5.2*  --  5.4*   BILITOT 3.7*  --  2.9*   ALKPHOS 71  --  71   ALT 14  --  15   AST 26  --  24   MG 1.9  1.9  1.9  1.9  1.9  1.9 2.0  2.0 1.9  1.9  1.9  1.9   PHOS 3.8 3.3  3.3 3.2  3.3  3.3  3.3  3.3       All pertinent labs within the past 24 hours have been reviewed.    Significant Imaging:  I have reviewed all pertinent imaging results/findings within the past 24 hours.    ABG  Recent Labs   Lab 12/10/24  0718   PH 7.253*  7.253*   PO2 33*  33*   PCO2 37.2  37.2   HCO3 16.4*  16.4*   BE -11*  -11*     Assessment/Plan:     Neuro  Encephalopathy, metabolic  Resolved  AAOx4  Will assess for signs of encephalopathy     Cardiac/Vascular  Atrial fibrillation  Holding home Eliquis in the setting of recent low blood counts  Continue metoprolol 12.5 mg BID    Renal/  Stage 3a chronic kidney disease  Nephrology following  Will try to remove more fluid  Strict intake and  output  Renally dose all medications  Avoid nephrotoxic agents  Interventional nephrology consulted for tunneled cath for HD    JAE (acute kidney injury)  Baseline creatinine is 1.5. May require dialysis long term.   Patient tolerated intermittent HD well (12/12)  Avoid nephrotoxins and renally dose meds for GFR listed above  Monitor urine output, serial BMP, and adjust therapy as needed  Interventional nephrology consulted for placement of tunneled cath    ID  Gram-negative bacteremia  Blood cultures from admission with B. Diminuta, micrococcus luteus, lysinobacillus species. Seen by ID previously who recommended stopping antibiotics due to concern these were contaminants. Repeat blood cultures have been no growth. Has since been transferred to ICU with increased pressor requirement. Blood cultures repeated on 11/24 are no growth x 24 hours.   11/29 Switched from zosyn to meropenem due to concern of thrombocytopenia.   Finished meropenem course 12/8 @ 6 PM    Severe sepsis  This patient does have evidence of infective focus  My overall impression is sepsis.  Source: Abdominal    Organ dysfunction indicated by Acute kidney injury    Fluid challenge Contraindicated- Fluid bolus is contraindicated in this patient due to End Stage Liver Disease     Post- resuscitation assessment No - Post resuscitation assessment not needed     Source control achieved by: antibiotics  Patient finished course of abx (meropenem) 12/8    Hematology  Thrombocytopenia  Ongoing issue, could be related to ESLD or medication induced.   Daily CBC  Transfuse for PLT<10k, or PLT<50K with active bleeding  Monitor for signs and symptoms of bleeding  Peripheral blood smear to evaluate thrombocytopenia    Coagulopathy  End Stage Liver Disease precipitated coagulopathy  Heparin d/c d/t thrombocytopenia  CTM    Endocrine  Adrenal insufficiency  On going problem since admission, most likely multifactorial - component of liver dysfunction, HRS, sepsis on  admission.  Continues to require levo, midodrine has been up titrated.   Random cortisol was 6   Persistent hypotension thought to be adrenal insufficiency.   Continue steroids  Consider endocrinology consult.      Hyponatremia  Likely dilutional secondary to end stage liver disease and HRS.   CTM, correct if appropriate    GI  * Decompensated cirrhosis  MELD 3.0: 32 at 11/29/2024  9:57 PM  MELD-Na: 31 at 11/29/2024  9:57 PM  Calculated from:  Serum Creatinine: 3 mg/dL at 11/29/2024  9:57 PM  Serum Sodium: 132 mmol/L at 11/29/2024  9:57 PM  Total Bilirubin: 2.9 mg/dL at 11/29/2024 10:34 AM  Serum Albumin: 2.8 g/dL at 11/29/2024  9:57 PM  INR(ratio): 2 at 11/27/2024  2:09 AM  Age at listing (hypothetical): 69 years  Sex: Male at 11/29/2024  9:57 PM    Per hepatology, pt is not a transplant candidate at this time      Abdominal pain  Patient developed abdominal pain 2/2 constipation last week and NGT was placed. 11/30 NGT was removed. The day after the NGT was removed he developed constipation and dilated bowel loops on AXR.  Some BM after brown bomb enema.   CTM  Lactulose TID    Palliative Care  Palliative care encounter  Will coordinate GOC discussion    Goals of care, counseling/discussion  Advance Care Planning  Will plan to coordinate with hepatology and palliative.            Critical Care Daily Checklist:    A: Awake: RASS Goal/Actual Goal: (RETIRED) RASS Goal: 0-->alert and calm  Actual:     B: Spontaneous Breathing Trial Performed?     C: SAT & SBT Coordinated?                   D: Delirium: CAM-ICU (RETIRED) Overall CAM-ICU: Negative   E: Early Mobility Performed? Yes   F: Feeding Goal: Goals: to meet % of EEN/EPN by next RD f/u  Status: Nutrition Goal Status: goal not met   Current Diet Order   Procedures    Diet Low Sodium, 2gm Standard Tray     Order Specific Question:   Tray type:     Answer:   Standard Tray      AS: Analgesia/Sedation    T: Thromboembolic Prophylaxis    H: HOB > 300 Yes   U:  Stress Ulcer Prophylaxis (if needed) Famotidine   G: Glucose Control    B: Bowel Function Stool Occurrence: 1   I: Indwelling Catheter (Lines & Boo) Necessity Trialysis, PIV   D: De-escalation of Antimicrobials/Pharmacotherapies     Plan for the day/ETD Step down    Code Status:  Family/Goals of Care: Full Code       Critical secondary to Patient has a condition that poses threat to life and bodily function: Acute Renal Failure      Critical care was time spent personally by me on the following activities: development of treatment plan with patient or surrogate and bedside caregivers, discussions with consultants, evaluation of patient's response to treatment, examination of patient, ordering and performing treatments and interventions, ordering and review of laboratory studies, ordering and review of radiographic studies, pulse oximetry, re-evaluation of patient's condition. This critical care time did not overlap with that of any other provider or involve time for any procedures.     Rhona Lewis, DO  Critical Care Medicine  [facility] - Medical ICU

## 2024-12-13 NOTE — ASSESSMENT & PLAN NOTE
Baseline creatinine is 1.5. May require dialysis long term.   Patient tolerated intermittent HD well (12/12)  Avoid nephrotoxins and renally dose meds for GFR listed above  Monitor urine output, serial BMP, and adjust therapy as needed  Interventional nephrology consulted for placement of tunneled cath

## 2024-12-13 NOTE — ASSESSMENT & PLAN NOTE
JAE is likely due to pre-renal azotemia due to intravascular volume depletion secondary to decompensated hepatic cirrhosis with volume overload and acute tubular necrosis caused by hemodynamic instability, renal hypoperfusion, severe sepsis with GNR bacteremia. Initial presentation concerning for hepatorenal syndrome, transferred to MICU for vasopressors without success in reaching MAP goal 85-90 for treatment of HRS. Currently, patient with oligouric JAE more contributed by ATN as above.     Recommendations:  - trial of 1st session of intermittent hemodialysis today, precautions to prevent hypotension, premedicate with midodrine 20 mg, use high calcium bath, ultrafiltration mottling, cool dialysate  - Map ranges from (low 50s to mid 60s) not on pressors  -  Recommend goals of care discussion regarding current prognosis and liver transplant candidacy   -Avoid nephrotoxins and renally dose meds for GFR listed above  -Monitor urine output, serial BMP, and adjust therapy as needed  -Hemodialysis consent obtained & placed in patient chart

## 2024-12-13 NOTE — SUBJECTIVE & OBJECTIVE
Interval History:     NAEON, we will start trial of intermittent hemodialysis, current blood pressure 101/50, sodium 133, potassium 3.3, serum creatinine 2.7, input/output positive 1693.5    Review of patient's allergies indicates:  No Known Allergies  Current Facility-Administered Medications   Medication Frequency    0.9%  NaCl infusion (for blood administration) Q24H PRN    0.9%  NaCl infusion (for blood administration) Q24H PRN    albuterol-ipratropium 2.5 mg-0.5 mg/3 mL nebulizer solution 3 mL Q6H PRN    aluminum-magnesium hydroxide-simethicone 200-200-20 mg/5 mL suspension 30 mL QID PRN    dextrose 10% bolus 125 mL 125 mL PRN    dextrose 10% bolus 250 mL 250 mL PRN    famotidine tablet 20 mg Every other day    glucagon (human recombinant) injection 1 mg PRN    glucose chewable tablet 16 g PRN    glucose chewable tablet 24 g PRN    hydrocortisone tablet 15 mg Before breakfast    And    hydrocortisone tablet 10 mg Daily    lactulose 20 gram/30 mL solution Soln 30 g TID    melatonin tablet 6 mg Nightly PRN    metoprolol tartrate (LOPRESSOR) split tablet 12.5 mg BID    midodrine tablet 20 mg Q8H    midodrine tablet 20 mg Daily PRN    naloxone 0.4 mg/mL injection 0.02 mg PRN    ondansetron injection 4 mg Q8H PRN    simethicone chewable tablet 80 mg QID PRN    sodium chloride 0.9% flush 10 mL PRN    tamsulosin 24 hr capsule 0.4 mg QHS       Objective:     Vital Signs (Most Recent):  Temp: 97.6 °F (36.4 °C) (12/12/24 1501)  Pulse: 84 (12/12/24 1745)  Resp: (!) 21 (12/12/24 1745)  BP: (!) 102/55 (12/12/24 1745)  SpO2: 100 % (12/12/24 1745) Vital Signs (24h Range):  Temp:  [96.9 °F (36.1 °C)-98.3 °F (36.8 °C)] 97.6 °F (36.4 °C)  Pulse:  [71-87] 84  Resp:  [9-34] 21  SpO2:  [100 %] 100 %  BP: ()/(40-71) 102/55     Weight: (!) 141.1 kg (311 lb 1.1 oz) (12/12/24 0400)  Body mass index is 41.04 kg/m².  Body surface area is 2.7 meters squared.    I/O last 3 completed shifts:  In: 1951 [P.O.:400; I.V.:304.3; IV  Piggyback:1246.6]  Out: 3144 [Other:3144]     Physical Exam  Constitutional:       General: He is not in acute distress.     Appearance: He is ill-appearing. He is not toxic-appearing.   HENT:      Head: Normocephalic and atraumatic.   Eyes:      Extraocular Movements: Extraocular movements intact.      Pupils: Pupils are equal, round, and reactive to light.   Cardiovascular:      Rate and Rhythm: Normal rate.   Pulmonary:      Breath sounds: No wheezing or rales.   Abdominal:      General: There is distension.      Tenderness: There is no abdominal tenderness. There is no guarding.   Musculoskeletal:      Right lower leg: Edema present.      Left lower leg: Edema present.   Skin:     Coloration: Skin is pale. Skin is not jaundiced.   Neurological:      Mental Status: He is oriented to person, place, and time.   Psychiatric:         Behavior: Behavior normal.          Significant Labs:  CBC:   Recent Labs   Lab 12/12/24 0331   WBC 6.14   RBC 2.64*   HGB 7.1*   HCT 21.5*   PLT 23*   MCV 81*   MCH 26.9*   MCHC 33.0     CMP:   Recent Labs   Lab 12/12/24  0331 12/12/24  1435   * 139*  139*   CALCIUM 8.2* 8.5*  8.5*   ALBUMIN 2.1* 2.2*  2.2*   PROT 5.2*  --    * 131*  131*   K 3.3* 3.6  3.6   CO2 18* 18*  18*    105  105   BUN 12 14  14   CREATININE 2.7* 2.9*  2.9*   ALKPHOS 71  --    ALT 14  --    AST 26  --    BILITOT 3.7*  --      LFTs:   Recent Labs   Lab 12/12/24  0331 12/12/24  1435   ALT 14  --    AST 26  --    ALKPHOS 71  --    BILITOT 3.7*  --    PROT 5.2*  --    ALBUMIN 2.1* 2.2*  2.2*

## 2024-12-13 NOTE — PLAN OF CARE
MICU DAILY GOALS     Family/Goals of care/Code Status   Code Status: Full Code    24H Vital Sign Range  Temp:  [96.9 °F (36.1 °C)-97.8 °F (36.6 °C)]   Pulse:  [71-88]   Resp:  [8-23]   BP: ()/(50-65)   SpO2:  [100 %]      Shift Events (include procedures and significant events)   No acute events throughout shift    AWAKE RASS: Goal - (RETIRED) RASS Goal: 0-->alert and calm  Actual - RASS (Pedraza Agitation-Sedation Scale): alert and calm    Restraint necessity: Not necessary   BREATHE SBT: Not intubated    Coordinate A & B, analgesics/sedatives Pain: managed   SAT: Not intubated   Delirium CAM-ICU: (RETIRED) Overall CAM-ICU: Negative   Early(intubated/ Progressive (non-intubated) Mobility MOVE Screen (INTUBATED ONLY): Not intubated    Activity: Activity Management: Arm raise - L1   Feeding/Nutrition Diet order: Diet/Nutrition Received: low saturated fat/low cholesterol, Specialty Diet/Nutrition Received: renal diet   Thrombus DVT prophylaxis: VTE Core Measure: Per order contraindicated for SCDs/Anticoagulants   HOB Elevation Head of Bed (HOB) Positioning: HOB elevated   Ulcer Prophylaxis GI: yes   Glucose control managed Glycemic Management: blood glucose monitored   Skin Skin assessment:     Sacrum intact/not altered? Yes  Heels intact/not altered? Yes  Surgical wound? No    CHECK ONE!   (no altered skin or altered skin) and sub boxes:  [] No Altered Skin Integrity Present    []Prevention Measures Documented    [x] Altered Skin Integrity Present or Discovered   [x] LDA present in EPIC, daily doc completed              [] LDA added if not in EPIC (describe wound).                    When describing wound, do not stage, use descriptive words only.    [] Wound Image Taken (required on admit,                   transfer/discharge and every Tuesday)    Wound Care Consulted? No   Bowel Function no issues    Indwelling Catheter Necessity [REMOVED]      Urethral Catheter 11/26/24 7299-Reason for Continuing Urinary  Catheterization: Urinary retention  [REMOVED]      Urethral Catheter 12/03/24 1607 Silicone 16 Fr.-Reason for Continuing Urinary Catheterization: Critically ill in ICU and requiring hourly monitoring of intake/output  [REMOVED]      Urethral Catheter 11/20/24 1802 Double-lumen-Reason for Continuing Urinary Catheterization: Urinary retention    Trialysis (Dialysis) Catheter 11/24/24 0441 right internal jugular-Line Necessity Review: CRRT/HD     De-escalation Antibiotics Yes        VS and assessment per flow sheet, patient progressing towards goals as tolerated, plan of care reviewed with  patient , all concerns addressed, will continue to monitor.

## 2024-12-13 NOTE — ASSESSMENT & PLAN NOTE
Nephrology following  Will try to remove more fluid  Strict intake and output  Renally dose all medications  Avoid nephrotoxic agents  Interventional nephrology consulted for tunneled cath for HD

## 2024-12-13 NOTE — PT/OT/SLP PROGRESS
Physical Therapy Co-Treatment    Patient Name:  Juan Carlos Yoo Sr.   MRN:  [id number]  Admitting Diagnosis:  Decompensated cirrhosis   Recent Surgery: * No surgery found *    Admit Date: 11/20/2024  Length of Stay: 23 days    Recommendations:     Discharge Recommendations:  Moderate Intensity Therapy     Discharge Equipment Recommendations: walker, rolling, wheelchair, bedside commode, grab bar, bath bench   Barriers to discharge: Decreased caregiver support and increased need for skilled assistance    Plan:     During this hospitalization, patient to be seen 4 x/week to address the identified rehab impairments via gait training, therapeutic activities, therapeutic exercises, neuromuscular re-education and progress towards the established goals.  Plan of Care Expires:  12/27/24  Plan of Care Reviewed with: patient    Assessment:     Juan Carlos Yoo Sr. is a 69 y.o. male admitted with a medical diagnosis of Decompensated cirrhosis. Pt demo'd improvements this session as he required decreased assistance with sit to stand transfers and gait this session. Pt remains limited in functional mobility by his deficits listed below. Patient would benefit from skilled therapy services to maximize safety and independence, increase activity tolerance, decrease fall risk, decrease caregiver burden, improve QOL, improve patient's functional mobility, and decrease risk of contractures and pressure sores. Patient continues to demonstrate the need for moderate intensity therapy on a daily basis post acute exhibited by decreased independence with functional mobility     Problem List: weakness, impaired endurance, impaired self care skills, impaired functional mobility, gait instability, impaired balance, decreased lower extremity function, decreased upper extremity function, decreased safety awareness, edema.  Rehab Prognosis: Good; patient would benefit from acute skilled PT services to address these deficits and reach maximum level of  "function.      Goals:   Multidisciplinary Problems       Physical Therapy Goals          Problem: Physical Therapy    Goal Priority Disciplines Outcome Interventions   Physical Therapy Goal     PT, PT/OT Progressing    Description: Goals to be met by: 24     Patient will increase functional independence with mobility by performin. Supine to sit with Minimal Assistance  2. Sit to stand transfer with Stand By Assistance  3. Bed to chair transfer with Stand By Assistance using LRAD  4. Gait  x 150 feet with Stand By Assistance using No LRAD.   5. Pt sit on EOB x 15 min with SBA and perform functional activities to increased functional mobility.                          Subjective     RN notified prior to session. No one present upon PT entrance into room. Patient agreeable to PT treatment session.    Chief Complaint: weakness  Patient/Family Comments/goals: increase strength and mobility   Pain/Comfort:  Pain Rating 1: 0/10  Pain Rating Post-Intervention 1: 0/10      Objective:     Additional staff present: OT; OT for cotx due to pt's multiple medical comorbidities and functional deficits req'ing two skilled therapists to appropriately maximize functional potential by progressing pt's musculoskeletal strength and endurance, facilitating neuromuscular postural balance and control ,and accommodating for patient's impaired cardiopulmonary tolerance to activities.     Patient found supine with: blood pressure cuff, telemetry, pulse ox (continuous), Trialysis, pressure relief boots, PureWick   Cognition:   Alert and Cooperative  Patient is oriented to Person, Place, Time, Situation  General Precautions: Standard, Cardiac fall   Orthopedic Precautions:N/A   Braces: N/A   Body mass index is 40.08 kg/m².  Oxygen Device: Room Air  Vitals: BP (!) 92/54 (BP Location: Right forearm, Patient Position: Lying)   Pulse 78   Temp 97.6 °F (36.4 °C) (Oral)   Resp 11   Ht 6' 1" (1.854 m)   Wt (!) 137.8 kg (303 lb 12.7 " oz)   SpO2 100%   BMI 40.08 kg/m²     Outcome Measures:  AM-PAC 6 CLICK MOBILITY  Turning over in bed (including adjusting bedclothes, sheets and blankets)?: 3  Sitting down on and standing up from a chair with arms (e.g., wheelchair, bedside commode, etc.): 3  Moving from lying on back to sitting on the side of the bed?: 3  Moving to and from a bed to a chair (including a wheelchair)?: 3  Need to walk in hospital room?: 3  Climbing 3-5 steps with a railing?: 2  Basic Mobility Total Score: 17     Functional Mobility:    Bed Mobility:   Supine > Sit with minimum assistance and 2 persons    Transfers:   Sit <> Stand Transfer: contact guard assistance with rolling walker from [facility]  Bed <> BSC Transfer: Step Transfer technique with contact guard assistance with rolling walker  BSC <> Chair Transfer: Step Transfer technique with contact guard assistance with rolling walker    Balance:  Sitting Balance  Static: stand by assistance  Dynamic: stand by assistance  Standing:  Static: contact guard assistance  Pt worked on standing balance and endurance while OT/RN assisted with pericare at [facility].   Dynamic: contact guard assistance      Gait:  Patient ambulated: 6 steps for each transfer   Patient required: contact guard  Patient used:  rolling walker   Gait Deviation(s): occasional unsteady gait, decreased step length, wide base of support, ambulates outside SHAYLEE of RW, flexed posture, and decreased osmar  all lines remained intact throughout ambulation trial  Comments: Patient required cues to walk in SHAYLEE of RW but difficult due to pt's body habitus. Verbal cues for upright posture and increased step length provided as well.     Education:  Time provided for education, counseling and discussion of health disposition in regards to patient's current status  All questions answered within PT scope of practice and to patient's satisfaction  PT role in POC to address current functional deficits  Call nursing/pct to  transfer to chair/use bathroom. Pt stated understanding.    Patient left up in chair with all lines intact, call button in reach, and RN present.    Time Tracking:     PT Received On: 12/13/24  PT Start Time: 1050     PT Stop Time: 1124  PT Total Time (min): 34 min     Billable Minutes:   Therapeutic Activity 24 minutes    PT/PTA: PT       12/13/2024

## 2024-12-13 NOTE — PLAN OF CARE
[facility]  Discharge Reassessment    Primary Care Provider: Nyla Rios FNP    Expected Discharge Date: 12/17/2024    Reassessment (most recent)       Discharge Reassessment - 12/13/24 1338          Discharge Reassessment    Assessment Type Discharge Planning Reassessment     Did the patient's condition or plan change since previous assessment? Yes     Discharge Plan discussed with: Patient     Communicated AUTUMN with patient/caregiver Date not available/Unable to determine     Discharge Plan A Skilled Nursing Facility     Discharge Plan B Home with family;Home Health     DME Needed Upon Discharge  other (see comments)   TBD    Transition of Care Barriers None     Why the patient remains in the hospital Requires continued medical care        Post-Acute Status    Post-Acute Authorization Placement     Post-Acute Placement Status Pending medical clearance/testing     Discharge Delays None known at this time                     Patient not stable for discharge at this time.  CM team will continue to follow patient's progress to discharge from MICU.  CM team remains available for any family concerns or needs.    Discharge Plan A and Plan B have been determined by review of patient's clinical status, future medical and therapeutic needs, and coverage/benefits for post-acute care in coordination with multidisciplinary team members.    Selwyn Davis, THERESE  [facility]  [phone]

## 2024-12-13 NOTE — SUBJECTIVE & OBJECTIVE
Interval History:     NAEON, AFVSS, status post iHD, 2500 mL of fluid removed, tolerated procedure well, blood pressure 100/55, map 72,    Review of patient's allergies indicates:  No Known Allergies  Current Facility-Administered Medications   Medication Frequency    albuterol-ipratropium 2.5 mg-0.5 mg/3 mL nebulizer solution 3 mL Q6H PRN    aluminum-magnesium hydroxide-simethicone 200-200-20 mg/5 mL suspension 30 mL QID PRN    dextrose 10% bolus 125 mL 125 mL PRN    dextrose 10% bolus 250 mL 250 mL PRN    famotidine tablet 20 mg Every other day    glucagon (human recombinant) injection 1 mg PRN    glucose chewable tablet 16 g PRN    glucose chewable tablet 24 g PRN    hydrocortisone tablet 15 mg Before breakfast    And    hydrocortisone tablet 10 mg Daily    lactulose 20 gram/30 mL solution Soln 30 g TID    melatonin tablet 6 mg Nightly PRN    metoprolol tartrate (LOPRESSOR) split tablet 12.5 mg BID    midodrine tablet 20 mg Q8H    midodrine tablet 20 mg Daily PRN    naloxone 0.4 mg/mL injection 0.02 mg PRN    ondansetron injection 4 mg Q8H PRN    simethicone chewable tablet 80 mg QID PRN    sodium chloride 0.9% flush 10 mL PRN    tamsulosin 24 hr capsule 0.4 mg QHS       Objective:     Vital Signs (Most Recent):  Temp: 97.6 °F (36.4 °C) (12/13/24 1101)  Pulse: 81 (12/13/24 1501)  Resp: (!) 22 (12/13/24 1501)  BP: (!) 104/58 (12/13/24 1501)  SpO2: 100 % (12/13/24 1501) Vital Signs (24h Range):  Temp:  [97.6 °F (36.4 °C)-97.8 °F (36.6 °C)] 97.6 °F (36.4 °C)  Pulse:  [71-88] 81  Resp:  [8-24] 22  SpO2:  [99 %-100 %] 100 %  BP: ()/(50-65) 104/58     Weight: (!) 137.8 kg (303 lb 12.7 oz) (12/13/24 0400)  Body mass index is 40.08 kg/m².  Body surface area is 2.66 meters squared.    I/O last 3 completed shifts:  In: 610 [P.O.:360; Other:250]  Out: 3000 [Other:3000]     Physical Exam  Constitutional:       General: He is not in acute distress.     Appearance: He is ill-appearing. He is not toxic-appearing.   HENT:       Head: Normocephalic and atraumatic.   Eyes:      Extraocular Movements: Extraocular movements intact.      Pupils: Pupils are equal, round, and reactive to light.   Cardiovascular:      Rate and Rhythm: Normal rate.   Musculoskeletal:      Right lower leg: Edema present.      Left lower leg: Edema present.   Skin:     Coloration: Skin is pale. Skin is not jaundiced.   Neurological:      Mental Status: He is oriented to person, place, and time.   Psychiatric:         Behavior: Behavior normal.          Significant Labs:  CBC:   Recent Labs   Lab 12/13/24 0258   WBC 7.38   RBC 2.78*   HGB 7.5*   HCT 22.0*   PLT 29*   MCV 79*   MCH 27.0   MCHC 34.1     CMP:   Recent Labs   Lab 12/13/24 0258 12/13/24  1406   *  112*  112*  112*  112* 130*  130*   CALCIUM 8.4*  8.3*  8.3*  8.3*  8.3* 8.4*  8.4*   ALBUMIN 2.2*  2.2*  2.2*  2.2*  2.2* 2.3*  2.3*   PROT 5.4*  --    *  131*  131*  131*  131* 130*  130*   K 3.6  3.6  3.6  3.6  3.6 3.4*  3.4*   CO2 19*  19*  19*  19*  19* 16*  16*     105  105  105  105 105  105   BUN 10  10  10  10  10 12  12   CREATININE 2.5*  2.5*  2.5*  2.5*  2.5* 2.9*  2.9*   ALKPHOS 71  --    ALT 15  --    AST 24  --    BILITOT 2.9*  --      LFTs:   Recent Labs   Lab 12/13/24 0258 12/13/24  1406   ALT 15  --    AST 24  --    ALKPHOS 71  --    BILITOT 2.9*  --    PROT 5.4*  --    ALBUMIN 2.2*  2.2*  2.2*  2.2*  2.2* 2.3*  2.3*

## 2024-12-13 NOTE — SUBJECTIVE & OBJECTIVE
Interval History/Significant Events: Patient had  intermittent HD in the evening, appeared to tolerate it without any immediate complications. Patient denies any new concerns at this time.     Denies HA, fevers, chills, chest pain, abdominal pain, N/V.    Review of Systems  Objective:     Vital Signs (Most Recent):  Temp: 97.6 °F (36.4 °C) (12/13/24 0701)  Pulse: 74 (12/13/24 0857)  Resp: 12 (12/13/24 0701)  BP: (!) 104/52 (12/13/24 0857)  SpO2: 100 % (12/13/24 0701) Vital Signs (24h Range):  Temp:  [97.6 °F (36.4 °C)-97.8 °F (36.6 °C)] 97.6 °F (36.4 °C)  Pulse:  [71-88] 74  Resp:  [8-23] 12  SpO2:  [100 %] 100 %  BP: ()/(50-65) 104/52   Weight: (!) 137.8 kg (303 lb 12.7 oz)  Body mass index is 40.08 kg/m².      Intake/Output Summary (Last 24 hours) at 12/13/2024 0933  Last data filed at 12/12/2024 1830  Gross per 24 hour   Intake 370 ml   Output 3000 ml   Net -2630 ml          Physical Exam  Vitals and nursing note reviewed.   Constitutional:       General: He is awake. He is not in acute distress.     Appearance: He is obese. He is ill-appearing. He is not toxic-appearing.   HENT:      Head: Normocephalic and atraumatic.   Eyes:      Extraocular Movements: Extraocular movements intact.      Conjunctiva/sclera: Conjunctivae normal.   Cardiovascular:      Rate and Rhythm: Normal rate.   Pulmonary:      Effort: Pulmonary effort is normal. No respiratory distress.   Abdominal:      General: There is distension.      Palpations: Abdomen is soft.      Tenderness: There is no abdominal tenderness. There is no guarding or rebound.   Musculoskeletal:         General: No swelling or tenderness.      Right lower leg: Edema present.      Left lower leg: Edema present.      Comments: 2+ pitting edema in bilateral lower extremities   Skin:     General: Skin is warm.      Coloration: Skin is not jaundiced.      Findings: Bruising present.   Neurological:      Mental Status: He is alert and oriented to person, place, and  time.      Motor: No weakness.            Vents:     Lines/Drains/Airways       Central Venous Catheter Line  Duration             Trialysis (Dialysis) Catheter 11/24/24 0441 right internal jugular 19 days              Drain  Duration             Male External Urinary Catheter 12/08/24 1532 4 days              Peripheral Intravenous Line  Duration                  Peripheral IV - Single Lumen 11/27/24 1101 20 G Anterior;Left Forearm 15 days                  Significant Labs:    CBC/Anemia Profile:  Recent Labs   Lab 12/11/24  1044 12/12/24  0331 12/13/24  0258   WBC 10.97 6.14 7.38   HGB 7.2* 7.1* 7.5*   HCT 22.6* 21.5* 22.0*   PLT 24* 23* 29*   MCV 80* 81* 79*   RDW 24.6* 25.2* 25.5*        Chemistries:  Recent Labs   Lab 12/12/24  0331 12/12/24  1435 12/13/24  0258   * 131*  131* 132*  131*  131*  131*  131*   K 3.3* 3.6  3.6 3.6  3.6  3.6  3.6  3.6    105  105 105  105  105  105  105   CO2 18* 18*  18* 19*  19*  19*  19*  19*   BUN 12 14  14 10  10  10  10  10   CREATININE 2.7* 2.9*  2.9* 2.5*  2.5*  2.5*  2.5*  2.5*   CALCIUM 8.2* 8.5*  8.5* 8.4*  8.3*  8.3*  8.3*  8.3*   ALBUMIN 2.1* 2.2*  2.2* 2.2*  2.2*  2.2*  2.2*  2.2*   PROT 5.2*  --  5.4*   BILITOT 3.7*  --  2.9*   ALKPHOS 71  --  71   ALT 14  --  15   AST 26  --  24   MG 1.9  1.9  1.9  1.9  1.9  1.9 2.0  2.0 1.9  1.9  1.9  1.9   PHOS 3.8 3.3  3.3 3.2  3.3  3.3  3.3  3.3       All pertinent labs within the past 24 hours have been reviewed.    Significant Imaging:  I have reviewed all pertinent imaging results/findings within the past 24 hours.

## 2024-12-13 NOTE — PROGRESS NOTES
[facility]  Nephrology  Progress Note    Patient Name: Juan Carlos Yoo Sr.  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 Recent admission 10/4 at [facility] for decompensated hepatic cirrhosis where he was discharged with oral diuretic regimen including furosemide 60 mg BID and metolazone 2.5 mg daily, low salt diet with fluid restriction. It is unclear if patient is adherent to these medications or lifestyle modifications. W/u notable for anemia with hb 6.7 s/p 1 unit pRBC 11/20 and JAE on CKD w elevated BUN 49/creatinine 5.9, hyperkalemia (K 6.1>5.1 after shifting), bicarb 18, elevated lactate up to 3.5. Nephrology consulted for JAE with c/o HRS    Interval History:     NAEON, we will start trial of intermittent hemodialysis, current blood pressure 101/50, sodium 133, potassium 3.3, serum creatinine 2.7, input/output positive 1693.5    Review of patient's allergies indicates:  No Known Allergies  Current Facility-Administered Medications   Medication Frequency    0.9%  NaCl infusion (for blood administration) Q24H PRN    0.9%  NaCl infusion (for blood administration) Q24H PRN    albuterol-ipratropium 2.5 mg-0.5 mg/3 mL nebulizer solution 3 mL Q6H PRN    aluminum-magnesium hydroxide-simethicone 200-200-20 mg/5 mL suspension 30 mL QID PRN    dextrose 10% bolus 125 mL 125 mL PRN    dextrose 10% bolus 250 mL 250 mL PRN    famotidine tablet 20 mg Every other day    glucagon (human recombinant) injection 1 mg PRN     glucose chewable tablet 16 g PRN    glucose chewable tablet 24 g PRN    hydrocortisone tablet 15 mg Before breakfast    And    hydrocortisone tablet 10 mg Daily    lactulose 20 gram/30 mL solution Soln 30 g TID    melatonin tablet 6 mg Nightly PRN    metoprolol tartrate (LOPRESSOR) split tablet 12.5 mg BID    midodrine tablet 20 mg Q8H    midodrine tablet 20 mg Daily PRN    naloxone 0.4 mg/mL injection 0.02 mg PRN    ondansetron injection 4 mg Q8H PRN    simethicone chewable tablet 80 mg QID PRN    sodium chloride 0.9% flush 10 mL PRN    tamsulosin 24 hr capsule 0.4 mg QHS       Objective:     Vital Signs (Most Recent):  Temp: 97.6 °F (36.4 °C) (12/12/24 1501)  Pulse: 84 (12/12/24 1745)  Resp: (!) 21 (12/12/24 1745)  BP: (!) 102/55 (12/12/24 1745)  SpO2: 100 % (12/12/24 1745) Vital Signs (24h Range):  Temp:  [96.9 °F (36.1 °C)-98.3 °F (36.8 °C)] 97.6 °F (36.4 °C)  Pulse:  [71-87] 84  Resp:  [9-34] 21  SpO2:  [100 %] 100 %  BP: ()/(40-71) 102/55     Weight: (!) 141.1 kg (311 lb 1.1 oz) (12/12/24 0400)  Body mass index is 41.04 kg/m².  Body surface area is 2.7 meters squared.    I/O last 3 completed shifts:  In: 1951 [P.O.:400; I.V.:304.3; IV Piggyback:1246.6]  Out: 3144 [Other:3144]     Physical Exam  Constitutional:       General: He is not in acute distress.     Appearance: He is ill-appearing. He is not toxic-appearing.   HENT:      Head: Normocephalic and atraumatic.   Eyes:      Extraocular Movements: Extraocular movements intact.      Pupils: Pupils are equal, round, and reactive to light.   Cardiovascular:      Rate and Rhythm: Normal rate.   Pulmonary:      Breath sounds: No wheezing or rales.   Abdominal:      General: There is distension.      Tenderness: There is no abdominal tenderness. There is no guarding.   Musculoskeletal:      Right lower leg: Edema present.      Left lower leg: Edema present.   Skin:     Coloration: Skin is pale. Skin is not jaundiced.   Neurological:      Mental Status: He is  oriented to person, place, and time.   Psychiatric:         Behavior: Behavior normal.          Significant Labs:  CBC:   Recent Labs   Lab 12/12/24  0331   WBC 6.14   RBC 2.64*   HGB 7.1*   HCT 21.5*   PLT 23*   MCV 81*   MCH 26.9*   MCHC 33.0     CMP:   Recent Labs   Lab 12/12/24  0331 12/12/24  1435   * 139*  139*   CALCIUM 8.2* 8.5*  8.5*   ALBUMIN 2.1* 2.2*  2.2*   PROT 5.2*  --    * 131*  131*   K 3.3* 3.6  3.6   CO2 18* 18*  18*    105  105   BUN 12 14  14   CREATININE 2.7* 2.9*  2.9*   ALKPHOS 71  --    ALT 14  --    AST 26  --    BILITOT 3.7*  --      LFTs:   Recent Labs   Lab 12/12/24  0331 12/12/24  1435   ALT 14  --    AST 26  --    ALKPHOS 71  --    BILITOT 3.7*  --    PROT 5.2*  --    ALBUMIN 2.1* 2.2*  2.2*       Assessment/Plan:     Renal/  JAE (acute kidney injury)  JAE is likely due to pre-renal azotemia due to intravascular volume depletion secondary to decompensated hepatic cirrhosis with volume overload and acute tubular necrosis caused by hemodynamic instability, renal hypoperfusion, severe sepsis with GNR bacteremia. Initial presentation concerning for hepatorenal syndrome, transferred to MICU for vasopressors without success in reaching MAP goal 85-90 for treatment of HRS. Currently, patient with oligouric JAE more contributed by ATN as above.     Recommendations:  - trial of 1st session of intermittent hemodialysis today, precautions to prevent hypotension, premedicate with midodrine 20 mg, use high calcium bath, ultrafiltration mottling, cool dialysate  - Map ranges from (low 50s to mid 60s) not on pressors  -  Recommend goals of care discussion regarding current prognosis and liver transplant candidacy   -Avoid nephrotoxins and renally dose meds for GFR listed above  -Monitor urine output, serial BMP, and adjust therapy as needed  -Hemodialysis consent obtained & placed in patient chart        Thank you for your consult. I will follow-up with patient. Please  contact us if you have any additional questions.    Irma Hernandez MD  Nephrology  [facility]    ATTENDING PHYSICIAN ATTESTATION  I have personally verified the history and examined the patient. I thoroughly reviewed the demographic, clinical, laboratorial and imaging information available in medical records. I agree with the assessment and recommendations provided by the subspecialty resident who was under my supervision.

## 2024-12-13 NOTE — PT/OT/SLP PROGRESS
Occupational Therapy   Treatment    Name: Juan Carlos Yoo Sr.  MRN: [id number]  Admitting Diagnosis:  Decompensated cirrhosis       Recommendations:     Discharge Recommendations: Moderate Intensity Therapy  Discharge Equipment Recommendations:  walker, rolling, wheelchair, bedside commode, grab bar, bath bench, hospital bed  Barriers to discharge:  Decreased caregiver support (Increased assistance with ADLs and mobility)    Assessment:     Juan Carlos Yoo Sr. is a 69 y.o. male with a medical diagnosis of Decompensated cirrhosis. Performance deficits affecting function are weakness, impaired endurance, impaired self care skills, impaired functional mobility, gait instability, impaired balance, decreased safety awareness, decreased lower extremity function, edema. Patient cleared for therapy and transferred to [facility] for toileting and needed extra time. Patient then transferred to bedside chair. Patient used RW during transfers. All VSS throughout. Patient would benefit from continued skilled acute OT 4x/wk to improve functional mobility, increase independence with ADLs, and address established goals. Recommending moderate intensity therapy once medically appropriate for discharge to increase maximal independence, reduce burden of care, and ensure safety.     Rehab Prognosis:  Good; patient would benefit from acute skilled OT services to address these deficits and reach maximum level of function.       Plan:     Patient to be seen 4 x/week to address the above listed problems via self-care/home management, therapeutic activities, therapeutic exercises  Plan of Care Expires: 12/27/24  Plan of Care Reviewed with: patient    Subjective     Chief Complaint: needing to go to the bathroom eventually  Patient/Family Comments/goals: patient agreed to therapy  Pain/Comfort:  Pain Rating 1: 0/10  Pain Rating Post-Intervention 1: 0/10    Objective:     Communicated with: NSG prior to session.  Patient found HOB elevated with blood pressure  cuff, telemetry, pulse ox (continuous), Trialysis, pressure relief boots, PureWick upon OT entry to room.    General Precautions: Standard, fall    Orthopedic Precautions:N/A  Braces: N/A  Respiratory Status: Room air     Occupational Performance:     Bed Mobility:    Patient completed Supine to Sit with minimum assistance with HOB elevated    Functional Mobility/Transfers:  Patient completed Sit <> Stand Transfer with contact guard assistance  with  rolling walker   Patient completed Toilet Transfer bed>BSC stand pivot technique with contact guard assistance with  rolling walker; BSC>bedside chair with stand step pivot technique with RW and CGA    Activities of Daily Living:  Grooming: contact guard assistance Seated-washing face and combing hair (patient had knots in the back of his head and needed assistance getting them out)  Upper Body Dressing: contact guard assistance [place] and donning front gown seated  Toileting: total assistance Patient had a bowel movement on BSC and needed to be cleaned.       [facility] 6 Click ADL: 12    Treatment & Education:  Role of OT and POC  ADL retraining  Functional mobility training  Safety  Discharge planning  Importance EOB/OOB activity    Patient sat EOB with SBA    Co-treatment performed due to patient's multiple deficits requiring two skilled therapists to appropriately and safely assess patient's strength and endurance while facilitating functional tasks in addition to accommodating for patient's activity tolerance.     Patient left up in chair with all lines intact, call button in reach, nurse present, and all needs met.     GOALS:   Multidisciplinary Problems       Occupational Therapy Goals          Problem: Occupational Therapy    Goal Priority Disciplines Outcome Interventions   Occupational Therapy Goal     OT, PT/OT Progressing    Description: Goals to be met by: 12/25/24     Patient will increase functional independence with ADLs by performing:    UE Dressing with  Set-up Assistance.  LE Dressing with Stand-by Assistance.  Grooming while standing at sink with Stand-by Assistance.  Toileting from toilet with Stand-by Assistance for hygiene and clothing management.   Step transfer: with SBA and use of LRAD  Supine to sit with SBA  Toilet transfer to toilet with SBA and use of LRAD.  Independent with HEP to BUE to improve ROM                         Time Tracking:     OT Date of Treatment: 12/13/24  OT Start Time: 1050  OT Stop Time: 1124  OT Total Time (min): 34 min    Billable Minutes:Self Care/Home Management 34               12/13/2024

## 2024-12-13 NOTE — RESIDENT HANDOFF
Critical Care Handoff     Primary Team: Networked reference to record PCT  Room Number: 7071/7071 A     Patient Name: Juan Carlos Yoo Sr. MRN: [id number]     Date of Birth: [id number] Allergies: Patient has no known allergies.     Age: 69 y.o. Admit Date: 11/20/2024     Sex: male  BMI: Body mass index is 40.08 kg/m².     Code Status: Full Code        llness Level (current clinical status): Watcher - No    Reason for Admission: Decompensated cirrhosis    Brief HPI (pertinent PMH and diagnosis or differential diagnosis): Juan Carlos Yoo is a 69 male with PMH of alcoholic cirrhosis, esophageal varices, Afib on eliquis, and HTN who presents for c/o worsening diffuse swelling as well as R arm pain/erythema. He was recently hospitalized 1 month ago at [facility] for volume overload in the setting of decompensated cirrhosis. He was treated with IV diuresis and discharged with adjustment to his diuretics, currently on lasix 60mg BID and metolazone 2.5mg every other week as per family. Patient reports diffuse swelling of his upper and lower extremities in addition to distended abdomen and worsening dyspnea, which he believes is due to recent medication adjustment. Family states he is non-compliant with low sodium diet and fluid restriction. Family states he has been compliant with diuretics, but rom't taken eliquis for 3 days due to issue getting refill. He also reports scratching right arm on door frame 3-4 days ago which has become red and painful after wrapping in in bandage. He claims to be compliant with medications, but is a poor historian. He follows with hepatology, not currently active on transplant list. He states he hasn't consumed alcohol for at least 9 months. Has c/o chills, but denies fever, cough, nausea, vomiting, chest pain, dysuria, hematuria, blood in stool. Workup in ED remarkable for VS: T 97.6 HR 94 RR 22 BP 95/56 O2 sat 97% on RA. Hb 6.7, MCV 75, Plt 81, PT/INR 22.6/2.2, Na 128, K 6.1, BUN/Cr 49/5.7, Alb 2.8,  AST/ALT 35/9, Ammonia 104, , Trop neg, LA 2.9, CXR: Cardiac size is enlarged similar to prior.  No large volume of pleural fluid noted although there is mild blunting of the right costophrenic angle which may relate to a small amount of pleural fluid.  Suspected right basilar opacity, to be correlated clinically for infection. RUE venous doppler: No thrombus in central veins of the right upper extremity. Patient received vanc/zosyn and lasix 80mg IVP in ED and will be admitted to hospital medicine service for further management.     Pt was admitted to hospital medicine. Blood cultures positive for Gram-positive cocci and Gram-negative rods. ID has been consulted. S/p 2 units PRBC for Hemoglobin dropped 6.8 on 11/21. Pt was on Eliquis for atrial fibrillation prior to admission which was held.  Hepatology following patient's clinical course, but are not evaluating him for transplant at this time. Diuretics were held because of concern for HRS.  Patient was started on albumin and midodrine per Nephrology recommendations for HRS.  Renal function continued to worsen and recommendation per Nephrology to transfer to ICU for maintaining goal map over 85 on Levophed.  ICU was notified and patient to be assessed to be transferred to ICU.        Procedure Date: N/A    Hospital Course (updated, brief assessment by system or problem, significant events): 69 y.o. male with history of EtOH use disorder, EtOH cirrhosis, HCC s/p y90, morbid obesity BMI 44, HTN, and Afib who presents with severe anasarca and JAE.      Appreciate hepatology and nephrology recommendations.  Diuretics were held because of concern for HRS.  Patient was started on albumin and midodrine per Nephrology recommendations for HRS.  Renal function continued to worsen and recommendation per Nephrology to transfer to ICU for maintaining goal map over 85 on Levophed.  ICU was notified and patient to be assessed to be transferred to ICU.     Patient also has  Gram-positive cocci and Gram-negative rods in his blood now.  Possible sources could be got translocation and barrier related infection through skin as he has been significantly edematous and has been oozing.  Has been on vancomycin and Rocephin.  Id was consulted this morning.  Repeat blood cultures were obtained.     Continues to have anemia.  Hemoglobin dropped on 11/20 and was given 1 unit of packed red blood cells.  Did not respond appropriately.  Hemoglobin dropped again to 6.8 on 11/21 and was given 1 unit of packed red blood cells.  Hemoglobin again on 11/22 is 7.2.  No reported melena or hematochezia.  Patient was on Eliquis for atrial fibrillation prior to admission which was held.  Given history of esophageal varices and portal hypertensive gastropathy whereas also could be component of slow bleeding, under production due to CKD and hemolysis while also with decompensated cirrhosis.  Started on Protonix IV b.i.d. and octreotide.     Also clarified with hepatology.  Given patient's current infection we will await ID recommendations however we will be challenging to consider transplant evaluation at this time.  Trend clinical course     Goal clarification with the patient and family.  Patient at this time wants to be full code and continue with Levophed in ICU.  They would consider discussion with palliative care in a day or so if things do not get better.     In MICU patient started on Levophed, however titration remained difficult given tachycardic response from the patient.     11/24 Trialysis and arterial lines placed overnight and currently on levo and norepi. SLED today.    11/25 Boo dc. Increased midodrine. Continue steroids until d/c pressors. Discussed with Nephrology about our concern for sustained use of pressors to achieve their MAP goals of 85. Will follow up tomorrow.     11/26 Platelets 48. Repeat 38 confirming thrombocytopenia. Concern for HIT. D/c heparin. Increased lactulose dosing.  Bladder scan revealed urine in bladder. Boo placed. Off vaso. Continuing levo. Maintain MAP goals 80. PT/OT consulted.     11/27 MAP goal 75-80. Heparin antibody result pending.     11/28 Pt with abdominal pain, decreased bowel movements, emesis. Lactic acid 2.9 and repeat 2.7. Less concerned for mesenteric ischemia and more of a concern was for SBO. NG tube placed with subsequent suction of 500mL fluid. Abdomen less distended after placement and pt subsequently had bowel movement. MAP Goal of 60 with plan to come off pressors entirely and switch to dialysis after permacath, as he is not  liver transplant eligible at this time.     11/29 Pt with ileus. Clear liquids for now. Platelets 24. HIT versus zosyn induced? Peripheral smear sent. Zosyn changed to kaylah. Consent and transfuse 1U. GOC conversation today. Pt opting for long term dialysis.     11/30 naeon. Started back on heparin. One time dose of 120mg lasix today. Hold off SLED/SCUF today and give IV lasix 120 mg once; plan for SLED/SCUF tomorrow     12/1 Patient is becoming more dependent on dialysis. Not a current liver transplant candidate. Still complaining of abdominal pain after discontinuing the NGT. AXR with evidence of dilated bowel loops. Brown bomb administered. The days following administration of the brown bomb, patient had more frequent bowel movements and was able to pass flatulence. Constipation eventually resolved.   Given the need for pressor support, he was worked up for adrenal insufficiency which was positive. Consequently, he was started on steroids for adrenal insufficiency. His blood pressures improved, however, he still required pressor support, particularly during dialysis. Pressor support was eventually weaned off.   Patient was started on midodrine to help with BP. Nephrology trialed intermittent HD and patient seemed to tolerate it well. Nephrology recommended placement of tunneled catheter for HD, interventional nephrology was  consulted. Patient was medically stable for step down to the hospital medicine floors.      Tasks (specific, using if-then statements): N/A    Contingency Plan (special circumstances anticipated and plan):   Additional dose of midodrine prior to dialysis    Estimated Discharge Date: 12/17/2024    Discharge Disposition: To be determined.     Mentored By:Dr. Mu Bird

## 2024-12-13 NOTE — NURSING TRANSFER
Nursing Transfer Note      12/13/2024   4:53 PM    Nurse giving handoff:JAZMYNE Burleson  Nurse receiving handoff:JAZMYNE Price    Reason patient is being transferred: Stepdown orders    Transfer To: [id number]    Transfer via bed    Transfer with cardiac monitoring    Transported by RN and PCT    Transfer Vital Signs:  Blood Pressure:118/58  Heart Rate:78  O2:100  Temperature:97.8  Respirations:16    Telemetry: Box Number [id number]  Order for Tele Monitor? Yes    Medicines sent: Lactulose      Patient belongings transferred with patient: Yes    Chart send with patient: Yes    Notified: son    Upon arrival to floor: cardiac monitor applied, patient oriented to room, call bell in reach, and bed in lowest position. Nurse at bedside, chart given. Pt hooked up to bedside monitor.

## 2024-12-13 NOTE — PROGRESS NOTES
HD completed, blood returned catheter ports flushed with normal saline , ports capped and secured. Post B/P 94/52. Pulse 88, resp 18, o2 sat 100% on room air. Net uf removed 2500 ml. Patient alert and stable post  HD

## 2024-12-13 NOTE — ASSESSMENT & PLAN NOTE
JAE is likely due to pre-renal azotemia due to intravascular volume depletion secondary to decompensated hepatic cirrhosis with volume overload and acute tubular necrosis caused by hemodynamic instability, renal hypoperfusion, severe sepsis with GNR bacteremia. Initial presentation concerning for hepatorenal syndrome, transferred to MICU for vasopressors without success in reaching MAP goal 85-90 for treatment of HRS. Currently, patient with oligouric JAE more contributed by ATN as above.     Recommendations:  - trial of of intermittent hemodialysis yesterday, tolerated the procedure well  - precautions to prevent hypotension, premedicate with midodrine 20 mg, use high calcium bath, ultrafiltration modeling, cool dialysate  - plan to replace his Trialysis line (19-day-old) to tunnel dialysis line, consult for intervention nephrology placed, team NPO Sunday midnight, obtain PT, PTT, INR  - Map ranges from (low 50s to mid 60s) not on pressors  -  Recommend goals of care discussion regarding current prognosis and liver transplant candidacy   -Avoid nephrotoxins and renally dose meds for GFR listed above  -Monitor urine output, serial BMP, and adjust therapy as needed  -Hemodialysis consent obtained & placed in patient chart

## 2024-12-14 PROBLEM — E27.40 ADRENAL INSUFFICIENCY: Status: ACTIVE | Noted: 2024-12-14

## 2024-12-14 LAB
ABO + RH BLD: NORMAL
ALBUMIN SERPL BCP-MCNC: 2.2 G/DL (ref 3.5–5.2)
ALP SERPL-CCNC: 78 U/L (ref 40–150)
ALT SERPL W/O P-5'-P-CCNC: 16 U/L (ref 10–44)
ANION GAP SERPL CALC-SCNC: 7 MMOL/L (ref 8–16)
ANION GAP SERPL CALC-SCNC: 8 MMOL/L (ref 8–16)
ANION GAP SERPL CALC-SCNC: 8 MMOL/L (ref 8–16)
APTT PPP: 39.7 SEC (ref 21–32)
AST SERPL-CCNC: 38 U/L (ref 10–40)
BASOPHILS # BLD AUTO: 0.04 K/UL (ref 0–0.2)
BASOPHILS # BLD AUTO: 0.05 K/UL (ref 0–0.2)
BASOPHILS NFR BLD: 0.6 % (ref 0–1.9)
BASOPHILS NFR BLD: 0.9 % (ref 0–1.9)
BILIRUB SERPL-MCNC: 2.9 MG/DL (ref 0.1–1)
BLD GP AB SCN CELLS X3 SERPL QL: NORMAL
BLD PROD TYP BPU: NORMAL
BLOOD UNIT EXPIRATION DATE: NORMAL
BLOOD UNIT TYPE CODE: 6200
BLOOD UNIT TYPE: NORMAL
BUN SERPL-MCNC: 13 MG/DL (ref 8–23)
CALCIUM SERPL-MCNC: 8.5 MG/DL (ref 8.7–10.5)
CALCIUM SERPL-MCNC: 8.6 MG/DL (ref 8.7–10.5)
CALCIUM SERPL-MCNC: 8.6 MG/DL (ref 8.7–10.5)
CHLORIDE SERPL-SCNC: 104 MMOL/L (ref 95–110)
CO2 SERPL-SCNC: 19 MMOL/L (ref 23–29)
CO2 SERPL-SCNC: 19 MMOL/L (ref 23–29)
CO2 SERPL-SCNC: 20 MMOL/L (ref 23–29)
CODING SYSTEM: NORMAL
CREAT SERPL-MCNC: 3.5 MG/DL (ref 0.5–1.4)
CROSSMATCH INTERPRETATION: NORMAL
DIFFERENTIAL METHOD BLD: ABNORMAL
DIFFERENTIAL METHOD BLD: ABNORMAL
DISPENSE STATUS: NORMAL
EOSINOPHIL # BLD AUTO: 0.1 K/UL (ref 0–0.5)
EOSINOPHIL # BLD AUTO: 0.1 K/UL (ref 0–0.5)
EOSINOPHIL NFR BLD: 1 % (ref 0–8)
EOSINOPHIL NFR BLD: 2.6 % (ref 0–8)
ERYTHROCYTE [DISTWIDTH] IN BLOOD BY AUTOMATED COUNT: 25.4 % (ref 11.5–14.5)
ERYTHROCYTE [DISTWIDTH] IN BLOOD BY AUTOMATED COUNT: 26.1 % (ref 11.5–14.5)
EST. GFR  (NO RACE VARIABLE): 17.9 ML/MIN/1.73 M^2
GLUCOSE SERPL-MCNC: 96 MG/DL (ref 70–110)
GLUCOSE SERPL-MCNC: 96 MG/DL (ref 70–110)
GLUCOSE SERPL-MCNC: 98 MG/DL (ref 70–110)
HCT VFR BLD AUTO: 21.2 % (ref 40–54)
HCT VFR BLD AUTO: 24.2 % (ref 40–54)
HGB BLD-MCNC: 6.8 G/DL (ref 14–18)
HGB BLD-MCNC: 7.9 G/DL (ref 14–18)
IMM GRANULOCYTES # BLD AUTO: 0.05 K/UL (ref 0–0.04)
IMM GRANULOCYTES # BLD AUTO: 0.1 K/UL (ref 0–0.04)
IMM GRANULOCYTES NFR BLD AUTO: 0.9 % (ref 0–0.5)
IMM GRANULOCYTES NFR BLD AUTO: 1.5 % (ref 0–0.5)
INR PPP: 1.8 (ref 0.8–1.2)
LYMPHOCYTES # BLD AUTO: 0.7 K/UL (ref 1–4.8)
LYMPHOCYTES # BLD AUTO: 0.7 K/UL (ref 1–4.8)
LYMPHOCYTES NFR BLD: 10.2 % (ref 18–48)
LYMPHOCYTES NFR BLD: 13 % (ref 18–48)
MAGNESIUM SERPL-MCNC: 2 MG/DL (ref 1.6–2.6)
MAGNESIUM SERPL-MCNC: 2 MG/DL (ref 1.6–2.6)
MCH RBC QN AUTO: 26.4 PG (ref 27–31)
MCH RBC QN AUTO: 27 PG (ref 27–31)
MCHC RBC AUTO-ENTMCNC: 32.1 G/DL (ref 32–36)
MCHC RBC AUTO-ENTMCNC: 32.6 G/DL (ref 32–36)
MCV RBC AUTO: 82 FL (ref 82–98)
MCV RBC AUTO: 83 FL (ref 82–98)
MONOCYTES # BLD AUTO: 0.8 K/UL (ref 0.3–1)
MONOCYTES # BLD AUTO: 0.9 K/UL (ref 0.3–1)
MONOCYTES NFR BLD: 11.5 % (ref 4–15)
MONOCYTES NFR BLD: 16.4 % (ref 4–15)
NEUTROPHILS # BLD AUTO: 3.6 K/UL (ref 1.8–7.7)
NEUTROPHILS # BLD AUTO: 5.1 K/UL (ref 1.8–7.7)
NEUTROPHILS NFR BLD: 66.2 % (ref 38–73)
NEUTROPHILS NFR BLD: 75.2 % (ref 38–73)
NRBC BLD-RTO: 0 /100 WBC
NRBC BLD-RTO: 0 /100 WBC
NUM UNITS TRANS PACKED RBC: NORMAL
PHOSPHATE SERPL-MCNC: 3.8 MG/DL (ref 2.7–4.5)
PHOSPHATE SERPL-MCNC: 3.9 MG/DL (ref 2.7–4.5)
PHOSPHATE SERPL-MCNC: 3.9 MG/DL (ref 2.7–4.5)
PLATELET # BLD AUTO: 40 K/UL (ref 150–450)
PLATELET # BLD AUTO: 47 K/UL (ref 150–450)
PMV BLD AUTO: ABNORMAL FL (ref 9.2–12.9)
PMV BLD AUTO: ABNORMAL FL (ref 9.2–12.9)
POTASSIUM SERPL-SCNC: 3.4 MMOL/L (ref 3.5–5.1)
PROT SERPL-MCNC: 5.2 G/DL (ref 6–8.4)
PROTHROMBIN TIME: 19.4 SEC (ref 9–12.5)
RBC # BLD AUTO: 2.58 M/UL (ref 4.6–6.2)
RBC # BLD AUTO: 2.93 M/UL (ref 4.6–6.2)
SODIUM SERPL-SCNC: 131 MMOL/L (ref 136–145)
SPECIMEN OUTDATE: NORMAL
WBC # BLD AUTO: 5.48 K/UL (ref 3.9–12.7)
WBC # BLD AUTO: 6.76 K/UL (ref 3.9–12.7)

## 2024-12-14 PROCEDURE — 20600001 HC STEP DOWN PRIVATE ROOM

## 2024-12-14 PROCEDURE — P9016 RBC LEUKOCYTES REDUCED: HCPCS | Performed by: INTERNAL MEDICINE

## 2024-12-14 PROCEDURE — 85730 THROMBOPLASTIN TIME PARTIAL: CPT | Performed by: INTERNAL MEDICINE

## 2024-12-14 PROCEDURE — 85025 COMPLETE CBC W/AUTO DIFF WBC: CPT | Mod: 91 | Performed by: INTERNAL MEDICINE

## 2024-12-14 PROCEDURE — 85610 PROTHROMBIN TIME: CPT | Performed by: INTERNAL MEDICINE

## 2024-12-14 PROCEDURE — 83735 ASSAY OF MAGNESIUM: CPT | Performed by: STUDENT IN AN ORGANIZED HEALTH CARE EDUCATION/TRAINING PROGRAM

## 2024-12-14 PROCEDURE — 25000003 PHARM REV CODE 250: Performed by: INTERNAL MEDICINE

## 2024-12-14 PROCEDURE — 86850 RBC ANTIBODY SCREEN: CPT | Performed by: INTERNAL MEDICINE

## 2024-12-14 PROCEDURE — 99222 1ST HOSP IP/OBS MODERATE 55: CPT | Mod: ,,, | Performed by: INTERNAL MEDICINE

## 2024-12-14 PROCEDURE — 80053 COMPREHEN METABOLIC PANEL: CPT

## 2024-12-14 PROCEDURE — 25000003 PHARM REV CODE 250

## 2024-12-14 PROCEDURE — 84100 ASSAY OF PHOSPHORUS: CPT

## 2024-12-14 PROCEDURE — 86920 COMPATIBILITY TEST SPIN: CPT | Performed by: INTERNAL MEDICINE

## 2024-12-14 PROCEDURE — 85025 COMPLETE CBC W/AUTO DIFF WBC: CPT

## 2024-12-14 PROCEDURE — 80069 RENAL FUNCTION PANEL: CPT | Performed by: STUDENT IN AN ORGANIZED HEALTH CARE EDUCATION/TRAINING PROGRAM

## 2024-12-14 RX ORDER — HYDROCORTISONE 5 MG/1
5 TABLET ORAL
Status: COMPLETED | OUTPATIENT
Start: 2024-12-24 | End: 2024-12-26

## 2024-12-14 RX ORDER — HYDROCORTISONE 5 MG/1
10 TABLET ORAL
Status: COMPLETED | OUTPATIENT
Start: 2024-12-15 | End: 2024-12-23

## 2024-12-14 RX ORDER — HYDROCORTISONE 5 MG/1
10 TABLET ORAL
Status: COMPLETED | OUTPATIENT
Start: 2024-12-15 | End: 2024-12-17

## 2024-12-14 RX ORDER — HYDROCODONE BITARTRATE AND ACETAMINOPHEN 500; 5 MG/1; MG/1
TABLET ORAL
Status: DISCONTINUED | OUTPATIENT
Start: 2024-12-14 | End: 2024-12-18

## 2024-12-14 RX ORDER — HYDROCORTISONE 5 MG/1
5 TABLET ORAL
Status: COMPLETED | OUTPATIENT
Start: 2024-12-18 | End: 2024-12-20

## 2024-12-14 RX ORDER — POTASSIUM CHLORIDE 20 MEQ/1
20 TABLET, EXTENDED RELEASE ORAL ONCE
Status: COMPLETED | OUTPATIENT
Start: 2024-12-14 | End: 2024-12-14

## 2024-12-14 RX ADMIN — METOPROLOL TARTRATE 12.5 MG: 25 TABLET, FILM COATED ORAL at 08:12

## 2024-12-14 RX ADMIN — Medication 6 MG: at 08:12

## 2024-12-14 RX ADMIN — MIDODRINE HYDROCHLORIDE 20 MG: 5 TABLET ORAL at 08:12

## 2024-12-14 RX ADMIN — FAMOTIDINE 20 MG: 20 TABLET ORAL at 08:12

## 2024-12-14 RX ADMIN — POTASSIUM CHLORIDE 20 MEQ: 1500 TABLET, EXTENDED RELEASE ORAL at 09:12

## 2024-12-14 RX ADMIN — MIDODRINE HYDROCHLORIDE 20 MG: 5 TABLET ORAL at 05:12

## 2024-12-14 RX ADMIN — HYDROCORTISONE 10 MG: 5 TABLET ORAL at 01:12

## 2024-12-14 RX ADMIN — ALUMINUM HYDROXIDE, MAGNESIUM HYDROXIDE, AND SIMETHICONE 30 ML: 200; 200; 20 SUSPENSION ORAL at 08:12

## 2024-12-14 RX ADMIN — LACTULOSE 30 G: 20 SOLUTION ORAL at 08:12

## 2024-12-14 RX ADMIN — HYDROCORTISONE 15 MG: 5 TABLET ORAL at 05:12

## 2024-12-14 RX ADMIN — LACTULOSE 30 G: 20 SOLUTION ORAL at 02:12

## 2024-12-14 RX ADMIN — TAMSULOSIN HYDROCHLORIDE 0.4 MG: 0.4 CAPSULE ORAL at 08:12

## 2024-12-14 RX ADMIN — MIDODRINE HYDROCHLORIDE 20 MG: 5 TABLET ORAL at 01:12

## 2024-12-14 NOTE — ASSESSMENT & PLAN NOTE
Patient underwent testing with cosyntropin stimulation test due to concerns for hypotension  caused by adrenal insufficiency       Latest Reference Range & Units 12/05/24 10:24 12/05/24 12:15 12/05/24 12:46   Cortisol ug/dL 8.90 14.50 16.50      These results are not compatible with adrenal insufficiency as patient is cortisol nik above 16 in the setting of a low albumin, which is inappropriate response to ACTH stimulation.  We do not believe patient needs to take steroids long term and his hypotension is probably resulting of combination of factors including his low plasma oncotic pressure from liver disease, as well as acute illness and bacteremia.      We would recommend tapering hydrocortisone as follows:   10 mg in the morning and 10 mg in the afternoon for 3 days   10 mg in the morning and 5 mg in the afternoon for 3 days   10 mg in the morning for 3 days   5 mg in the morning for 3 days,  after which hydrocortisone can be safely discontinued

## 2024-12-14 NOTE — CONSULTS
[facility] - [facility]plant Step[facility]  Endocrinology  Consult Note    Consult Requested by: Giovani Wheat MD   Reason for admit: Decompensated cirrhosis    HISTORY OF PRESENT ILLNESS:  Mr. Yoo is a 69-year-old man with a history of decompensated cirrhosis, atrial fibrillation and hypertension who presented to the hospital due to severe swelling on his upper and lower extremities as well as his abdomen.  He has previously been hospitalized for volume overload and tells us that he has been compliant with his diuretic regimen.  During this hospitalization he was found to be bacteremic with Gram-positive cocci and Gram-negative rods, also admitted to the ICU due to pressor requirement to maintain normal blood pressure levels.  He was given albumin and was on norepinephrine, which was subsequently titrated off and patient was started on midodrine.  Endocrinology was consulted d/t concern for adrenal insufficiency.    Of note, when patient had low blood pressure he was started on stress dose steroids which were discontinued within a few days.  Random cortisol testing showed a cortisol that was perceived to be low, patient underwent cosyntropin stimulation testing and stimmed to 16. Patient was given a diagnosis of adrenal insufficiency and started on hydrocortisone 15/10 which he is still currently taking.  His vital signs are stable and he denies history of signs or symptoms of adrenal insufficiency. He was not using steroids as an outpatient and was never diagnosed with AI.    Lab Results   Component Value Date    TSH 2.208 12/13/2022    TSH 1.928 11/29/2022    ACTH 14 12/05/2024    CORTISOL 16.50 12/05/2024    CORTISOL 14.50 12/05/2024    CORTISOL 8.90 12/05/2024    CORTISOL 6.90 12/04/2024     (L) 12/14/2024     (L) 12/14/2024     (L) 12/14/2024     (L) 12/13/2024     (L) 12/13/2024    K 3.4 (L) 12/14/2024    K 3.4 (L) 12/14/2024    K 3.4 (L) 12/14/2024    K 3.4 (L) 12/13/2024    K 3.4 (L)  "12/13/2024    CALCIUM 8.5 (L) 12/14/2024    CALCIUM 8.6 (L) 12/14/2024    CALCIUM 8.6 (L) 12/14/2024    CALCIUM 8.4 (L) 12/13/2024    CALCIUM 8.4 (L) 12/13/2024    ALBUMIN 2.2 (L) 12/14/2024    ALBUMIN 2.2 (L) 12/14/2024    ALBUMIN 2.2 (L) 12/14/2024    ALBUMIN 2.3 (L) 12/13/2024    ALBUMIN 2.3 (L) 12/13/2024    ESTGFRAFRICA >60.0 06/13/2022    ESTGFRAFRICA >60.0 05/31/2022    ESTGFRAFRICA >60.0 04/01/2022    ESTGFRAFRICA >60.0 11/20/2019    EGFRNONAA >60.0 06/13/2022    EGFRNONAA >60.0 05/31/2022    EGFRNONAA >60.0 04/01/2022    EGFRNONAA >60.0 11/20/2019         Medications and/or Treatments Impacting Glycemic Control:  Immunotherapy:    Immunosuppressants       None          Steroids:   Hormones (From admission, onward)      Start     Stop Route Frequency Ordered    12/06/24 0600  hydrocortisone tablet 15 mg        Placed in "And" Linked Group    -- Oral Before breakfast 12/05/24 1404    12/05/24 1415  hydrocortisone tablet 10 mg        Placed in "And" Linked Group    -- Oral Daily 12/05/24 1404    11/20/24 1718  melatonin tablet 6 mg         -- Oral Nightly PRN 11/20/24 1622          Pressors:    Autonomic Drugs (From admission, onward)      Start     Stop Route Frequency Ordered    12/13/24 0856  midodrine tablet 20 mg         -- Oral Daily PRN 12/13/24 0856    12/04/24 1400  midodrine tablet 20 mg         -- Oral Every 8 hours 12/04/24 0933    12/03/24 0915  metoprolol tartrate (LOPRESSOR) split tablet 12.5 mg         -- Oral 2 times daily 12/03/24 0908    11/24/24 1452  NORepinephrine bitartrate-NaCl 4 mg/250 mL (16 mcg/mL) infusion Soln        Note to Pharmacy: Created by cabinet override    11/25/24 0259   11/24/24 1452            Facility-Administered Medications Prior to Admission   Medication Dose Route Frequency Provider Last Rate Last Admin    [DISCONTINUED] sodium chloride 0.9% flush 10 mL  10 mL Intravenous PRN Flo Malone MD         Medications Prior to Admission   Medication Sig Dispense " Refill Last Dose/Taking    aMILoride (MIDAMOR) 5 MG Tab Take 2 tablets (10 mg total) by mouth once daily. Start with 5 mg (1 tablet) daily, increase to 10 mg (2 tablets) daily if not losing 3 pounds a week. 60 tablet 11 11/20/2024    ascorbic acid, vitamin C, (VITAMIN C) 500 MG tablet Take 500 mg by mouth once daily.   11/20/2024    cyanocobalamin (VITAMIN B-12) 100 MCG tablet Take 100 mcg by mouth once daily.   11/20/2024    fish oil-omega-3 fatty acids 300-1,000 mg capsule Take 1 g by mouth 2 (two) times daily.   11/20/2024    furosemide (LASIX) 40 MG tablet TAKE ONE TABLET BY MOUTH TWICE DAILY 180 tablet 3 11/20/2024    nadoloL (CORGARD) 20 MG tablet TAKE 2 TABLETS BY MOUTH EVERY EVENING 180 tablet 3 11/20/2024    potassium chloride SA (KLOR-CON M20) 20 MEQ tablet Take 1 tablet (20 mEq total) by mouth 2 (two) times daily. 60 tablet 3 11/20/2024    tamsulosin (FLOMAX) 0.4 mg Cap TAKE 1 CAPSULE (0.4 MG TOTAL) BY MOUTH EVERY EVENING. 90 capsule 2 11/20/2024    amLODIPine (NORVASC) 10 MG tablet Take 1 tablet (10 mg total) by mouth once daily. 90 tablet 3     pantoprazole (PROTONIX) 40 MG tablet Take 1 tablet (40 mg total) by mouth once daily. 90 tablet 3        Current Facility-Administered Medications   Medication Dose Route Frequency Provider Last Rate Last Admin    0.9%  NaCl infusion (for blood administration)   Intravenous Q24H PRN Giovani Wheat MD        albuterol-ipratropium 2.5 mg-0.5 mg/3 mL nebulizer solution 3 mL  3 mL Nebulization Q6H PRN Giovani Wheat MD        aluminum-magnesium hydroxide-simethicone 200-200-20 mg/5 mL suspension 30 mL  30 mL Oral QID PRN Giovani Wheat MD        dextrose 10% bolus 125 mL 125 mL  12.5 g Intravenous PRN Giovani Wheat MD        dextrose 10% bolus 250 mL 250 mL  25 g Intravenous PRN Giovani Wheat MD        famotidine tablet 20 mg  20 mg Oral Every other day Mu Bird MD   20 mg at 12/14/24 0827    glucagon (human recombinant) injection 1 mg  1 mg  Intramuscular PRN Giovani Wheat MD        glucose chewable tablet 16 g  16 g Oral PRN Giovani Wheat MD        glucose chewable tablet 24 g  24 g Oral PRN Giovani Wheat MD        hydrocortisone tablet 15 mg  15 mg Oral Before breakfast Evelyn Amos MD   15 mg at 12/14/24 0521    And    hydrocortisone tablet 10 mg  10 mg Oral Daily Evelyn Amos MD   10 mg at 12/13/24 1402    lactulose 20 gram/30 mL solution Soln 30 g  30 g Oral TID Livier Macario MD   30 g at 12/14/24 0827    melatonin tablet 6 mg  6 mg Oral Nightly PRN Giovani Wheat MD   6 mg at 12/08/24 2108    metoprolol tartrate (LOPRESSOR) split tablet 12.5 mg  12.5 mg Oral BID Rhona Lewis, DO   12.5 mg at 12/14/24 0827    midodrine tablet 20 mg  20 mg Oral Q8H Evelyn Amos MD   20 mg at 12/14/24 0521    midodrine tablet 20 mg  20 mg Oral Daily PRN Mu Bird MD        naloxone 0.4 mg/mL injection 0.02 mg  0.02 mg Intravenous PRN Giovani Wheat MD        ondansetron injection 4 mg  4 mg Intravenous Q8H PRN Giovani Wheat MD   4 mg at 12/10/24 1058    simethicone chewable tablet 80 mg  1 tablet Oral QID PRN Giovani Wheat MD        sodium chloride 0.9% flush 10 mL  10 mL Intravenous PRN Jasen Wong MD        tamsulosin 24 hr capsule 0.4 mg  1 capsule Oral QHS Giovani Wheat MD   0.4 mg at 12/13/24 2138       ROS:    Review of Systems   Respiratory:  Negative for shortness of breath.    Cardiovascular:  Positive for leg swelling.   Gastrointestinal:  Positive for abdominal distention. Negative for nausea and vomiting.   Endocrine: Negative.    Musculoskeletal: Negative.    Skin:  Positive for wound.   Neurological: Negative.    Psychiatric/Behavioral: Negative.         Labs Reviewed and Include:    Recent Labs   Lab 12/14/24  0517   GLU 96  96  98   CALCIUM 8.6*  8.6*  8.5*   ALBUMIN 2.2*  2.2*  2.2*   PROT 5.2*   *  131*  131*   K 3.4*  3.4*  3.4*   CO2 19*  19*  20*     104  104   BUN 13  13  13    CREATININE 3.5*  3.5*  3.5*   ALKPHOS 78   ALT 16   AST 38   BILITOT 2.9*     Lab Results   Component Value Date    HGBA1C 4.5 12/13/2022       Nutritional status:   Body mass index is 40.08 kg/m².  Lab Results   Component Value Date    ALBUMIN 2.2 (L) 12/14/2024    ALBUMIN 2.2 (L) 12/14/2024    ALBUMIN 2.2 (L) 12/14/2024        PHYSICAL EXAMINATION:  Vitals:    12/14/24 1123   BP: (!) 109/57   Pulse: 72   Resp: 14   Temp: 97.9 °F (36.6 °C)     Body mass index is 40.08 kg/m².     Physical Exam  Vitals reviewed.   Constitutional:       Appearance: Normal appearance.   Eyes:      Extraocular Movements: Extraocular movements intact.      Conjunctiva/sclera: Conjunctivae normal.   Cardiovascular:      Rate and Rhythm: Normal rate and regular rhythm.   Pulmonary:      Effort: Pulmonary effort is normal. No respiratory distress.   Musculoskeletal:      Right lower leg: Edema present.      Left lower leg: Edema present.   Skin:     Comments: Wounds on bl arms covered by dressing   Neurological:      Mental Status: He is alert and oriented to person, place, and time. Mental status is at baseline.   Psychiatric:         Mood and Affect: Mood normal.         Behavior: Behavior normal.        .     ASSESSMENT and PLAN:    Cardiac/Vascular  Hypotension   Patient underwent testing with cosyntropin stimulation test due to concerns for hypotension  caused by adrenal insufficiency       Latest Reference Range & Units 12/05/24 10:24 12/05/24 12:15 12/05/24 12:46   Cortisol ug/dL 8.90 14.50 16.50      These results are not compatible with adrenal insufficiency as patient is cortisol nik above 16 in the setting of a low albumin, which is inappropriate response to ACTH stimulation.  We do not believe patient needs to take steroids long term and his hypotension is probably resulting of combination of factors including his low plasma oncotic pressure from liver disease, as well as acute illness and bacteremia.      We would  recommend tapering hydrocortisone as follows:   10 mg in the morning and 10 mg in the afternoon for 3 days   10 mg in the morning and 5 mg in the afternoon for 3 days   10 mg in the morning for 3 days   5 mg in the morning for 3 days,  after which hydrocortisone can be safely discontinued      GI  * Decompensated cirrhosis  Likely driving the hyponatremia and contributing to hypotension.  Low albumin is a marker of low CBG, meaning his cortisol levels are appropriate for his degree of liver dysfunction.         Endocrinology will sign off, please call if further questions or concerns    Stacie Hensley MD  Endocrinology  [facility]

## 2024-12-14 NOTE — ASSESSMENT & PLAN NOTE
Likely driving the hyponatremia and contributing to hypotension.  Low albumin is a marker of low CBG, meaning his cortisol levels are appropriate for his degree of liver dysfunction.

## 2024-12-14 NOTE — ASSESSMENT & PLAN NOTE
Baseline creatinine is  1.5 . Most recent creatinine and eGFR are listed below.    Recent Labs     12/13/24  0258 12/13/24  1406 12/14/24  0517   CREATININE 2.5*  2.5*  2.5*  2.5*  2.5* 2.9*  2.9* 3.5*  3.5*  3.5*   EGFRNORACEVR 26.8*  26.8*  26.8*  26.8*  26.8* 22.4*  22.4* 17.9*  17.9*  17.9*        Plan  - JAE is worsening. Will continue current treatment  - Avoid nephrotoxins and renally dose meds for GFR listed above  - Monitor urine output, serial BMP, and adjust therapy as needed    - Etiology includes volume overload, obstruction, hypotension, possible HRS  Baseline creatinine is 1.5. May require dialysis long term.   Patient tolerated intermittent HD well (12/12)  Avoid nephrotoxins and renally dose meds for GFR listed above  Monitor urine output, serial BMP, and adjust therapy as needed  Interventional nephrology consulted for placement of tunneled cath, planning for 12/16

## 2024-12-14 NOTE — PROGRESS NOTES
Assumed care for pt from Hosea ZARAGOZA RN  - PT AAO, awaiting repeat CBC.  I/O cath 250cc - repeat bladder scan at 2300

## 2024-12-14 NOTE — ASSESSMENT & PLAN NOTE
Patient with Acute on chronic debility due to chronic unspecified fatigue, age-related physical debility, and other reduced mobility. The patient's latest AMPAC (Activity Measure for Post Acute Care) Score is listed below.    AM-PAC Score - How much help does the patient need for each activity listed  Basic Mobility Total Score: 17  Turning over in bed (including adjusting bedclothes, sheets and blankets)?: A little  Sitting down on and standing up from a chair with arms (e.g., wheelchair, bedside commode, etc.): A little  Moving from lying on back to sitting on the side of the bed?: A little  Moving to and from a bed to a chair (including a wheelchair)?: A little  Need to walk in hospital room?: A little  Climbing 3-5 steps with a railing?: A lot    Plan  - Progressive mobility protocol initated  - PT/OT consulted  - Fall precautions in place  - PT/OT recommending LI therapy. Patient interested in SNF.

## 2024-12-14 NOTE — ASSESSMENT & PLAN NOTE
Volume Overload  Etoh Cirrhosis  Hx of HCC s/p Y90    MELD 3.0: 32 at 12/14/2024  5:17 AM  MELD-Na: 31 at 12/14/2024  5:17 AM  Calculated from:  Serum Creatinine: 3.5 mg/dL (Using max of 3 mg/dL) at 12/14/2024  5:17 AM  Serum Sodium: 131 mmol/L at 12/14/2024  5:17 AM  Total Bilirubin: 2.9 mg/dL at 12/14/2024  5:17 AM  Serum Albumin: 2.2 g/dL at 12/14/2024  5:17 AM  INR(ratio): 1.8 at 12/14/2024  5:17 AM  Age at listing (hypothetical): 69 years  Sex: Male at 12/14/2024  5:17 AM    Cont lactulose  Daily CMP  Per hepatology, pt is not a transplant candidate at this time

## 2024-12-14 NOTE — ASSESSMENT & PLAN NOTE
Blood cultures from admission with B. Diminuta, micrococcus luteus, lysinobacillus species. Seen by ID previously who recommended stopping antibiotics due to concern these were contaminants. Repeat blood cultures have been no growth. Has since been transferred to ICU with increased pressor requirement. Blood cultures repeated on 11/24 are no growth x 24 hours.   11/29 Switched from zosyn to meropenem due to concern of thrombocytopenia.   Finished meropenem course 12/8 @ 6 PM

## 2024-12-14 NOTE — ASSESSMENT & PLAN NOTE
Nephrology following  Will try to remove more fluid  Strict intake and output  Renally dose all medications  Avoid nephrotoxic agents  Interventional nephrology consulted for tunneled cath for HD, plan for 12/16

## 2024-12-14 NOTE — PROGRESS NOTES
[facility] - [facility] Medicine  Progress Note    Patient Name: Juan Carlos Yoo Sr.  MRN: [id number]  Patient Class: IP- Inpatient   Admission Date: 11/20/2024  Length of Stay: 24 days  Attending Physician: Giovani Wheat MD  Primary Care Provider: Nyla Rios FNP        Subjective     Principal Problem:Decompensated cirrhosis        HPI:  69M with PMH of alcoholic cirrhosis, esophageal varices, Afib on eliquis, and HTN who presents for c/o worsening diffuse swelling as well as R arm pain/erythema. He was recently hospitalized 1 month ago at [facility] for volume overload in the setting of decompensated cirrhosis. He was treated with IV diuresis and discharged with adjustment to his diuretics, currently on lasix 60mg BID and metolazone 2.5mg every other week as per family. Patient reports diffuse swelling of his upper and lower extremities in addition to distended abdomen and worsening dyspnea, which he believes is due to recent medication adjustment. Family states he is non-compliant with low sodium diet and fluid restriction. Family states he has been compliant with diuretics, but rom't taken eliquis for 3 days due to issue getting refill. He also reports scratching right arm on door frame 3-4 days ago which has become red and painful after wrapping in in bandage. He claims to be compliant with medications, but is a poor historian. He follows with hepatology, not currently active on transplant list. He states he hasn't consumed alcohol for at least 9 months. Has c/o chills, but denies fever, cough, nausea, vomiting, chest pain, dysuria, hematuria, blood in stool. Workup in ED remarkable for VS: T 97.6 HR 94 RR 22 BP 95/56 O2 sat 97% on RA. Hb 6.7, MCV 75, Plt 81, PT/INR 22.6/2.2, Na 128, K 6.1, BUN/Cr 49/5.7, Alb 2.8, AST/ALT 35/9, Ammonia 104, , Trop neg, LA 2.9, CXR: Cardiac size is enlarged similar to prior.  No large volume of pleural fluid noted although there is mild blunting of  the right costophrenic angle which may relate to a small amount of pleural fluid.  Suspected right basilar opacity, to be correlated clinically for infection. RUE venous doppler: No thrombus in central veins of the right upper extremity. Patient received vanc/zosyn and lasix 80mg IVP in ED and will be admitted to hospital medicine service for further management.    Overview/Hospital Course:  69 y.o. male with history of EtOH use disorder, EtOH cirrhosis, HCC s/p y90, morbid obesity BMI 44, HTN, and Afib who presents with severe anasarca and JAE.      Appreciate hepatology and nephrology recommendations.  Diuretics were held because of concern for HRS.  Patient was started on albumin and midodrine per Nephrology recommendations for HRS.  Renal function continued to worsen and recommendation per Nephrology to transfer to ICU for maintaining goal map over 85 on Levophed.  ICU was notified and patient to be assessed to be transferred to ICU.     Patient also has Gram-positive cocci and Gram-negative rods in his blood now.  Possible sources could be got translocation and barrier related infection through skin as he has been significantly edematous and has been oozing.  Has been on vancomycin and Rocephin.  Id was consulted this morning.  Repeat blood cultures were obtained.     Continues to have anemia.  Hemoglobin dropped on 11/20 and was given 1 unit of packed red blood cells.  Did not respond appropriately.  Hemoglobin dropped again to 6.8 on 11/21 and was given 1 unit of packed red blood cells.  Hemoglobin again on 11/22 is 7.2.  No reported melena or hematochezia.  Patient was on Eliquis for atrial fibrillation prior to admission which was held.  Given history of esophageal varices and portal hypertensive gastropathy whereas also could be component of slow bleeding, under production due to CKD and hemolysis while also with decompensated cirrhosis.  Started on Protonix IV b.i.d. and octreotide.     Also clarified  with hepatology.  Given patient's current infection we will await ID recommendations however we will be challenging to consider transplant evaluation at this time.  Trend clinical course     Goal clarification with the patient and family.  Patient at this time wants to be full code and continue with Levophed in ICU.  They would consider discussion with palliative care in a day or so if things do not get better.      In MICU patient started on Levophed, however titration remained difficult given tachycardic response from the patient.      11/24 Trialysis and arterial lines placed overnight and currently on levo and norepi. SLED today.     11/25 Boo dc. Increased midodrine. Continue steroids until d/c pressors. Discussed with Nephrology about our concern for sustained use of pressors to achieve their MAP goals of 85. Will follow up tomorrow.      11/26 Platelets 48. Repeat 38 confirming thrombocytopenia. Concern for HIT. D/c heparin. Increased lactulose dosing. Bladder scan revealed urine in bladder. Boo placed. Off vaso. Continuing levo. Maintain MAP goals 80. PT/OT consulted.      11/27 MAP goal 75-80. Heparin antibody result pending.      11/28 Pt with abdominal pain, decreased bowel movements, emesis. Lactic acid 2.9 and repeat 2.7. Less concerned for mesenteric ischemia and more of a concern was for SBO. NG tube placed with subsequent suction of 500mL fluid. Abdomen less distended after placement and pt subsequently had bowel movement. MAP Goal of 60 with plan to come off pressors entirely and switch to dialysis after permacath, as he is not  liver transplant eligible at this time.      11/29 Pt with ileus. Clear liquids for now. Platelets 24. HIT versus zosyn induced? Peripheral smear sent. Zosyn changed to kaylah. Consent and transfuse 1U. GOC conversation today. Pt opting for long term dialysis.      11/30 naeon. Started back on heparin. One time dose of 120mg lasix today. Hold off SLED/SCUF today and give  IV lasix 120 mg once; plan for SLED/SCUF tomorrow      12/1 Patient is becoming more dependent on dialysis. Not a current liver transplant candidate. Still complaining of abdominal pain after discontinuing the NGT. AXR with evidence of dilated bowel loops. Brown bomb administered. The days following administration of the brown bomb, patient had more frequent bowel movements and was able to pass flatulence. Constipation eventually resolved.   Given the need for pressor support, he was worked up for adrenal insufficiency which was positive. Consequently, he was started on steroids for adrenal insufficiency. His blood pressures improved, however, he still required pressor support, particularly during dialysis. Pressor support was eventually weaned off.   Patient was started on midodrine to help with BP. Nephrology trialed intermittent HD and patient seemed to tolerate it well. Nephrology recommended placement of tunneled catheter for HD, interventional nephrology was consulted. Patient was medically stable for step down to the hospital medicine floors.         Interval History/Significant Events: NAEON. VSS. Hb 6.8 this AM, no obvious bleeding.     Review of Systems  Objective:     Vital Signs (Most Recent):  Temp: 98 °F (36.7 °C) (12/14/24 1530)  Pulse: 77 (12/14/24 1530)  Resp: 14 (12/14/24 1530)  BP: (!) 97/49 (12/14/24 1530)  SpO2: (!) 93 % (12/14/24 1530) Vital Signs (24h Range):  Temp:  [97.5 °F (36.4 °C)-98.6 °F (37 °C)] 98 °F (36.7 °C)  Pulse:  [66-79] 77  Resp:  [14-18] 14  SpO2:  [90 %-100 %] 93 %  BP: ()/(49-62) 97/49   Weight: (!) 137.8 kg (303 lb 12.7 oz)  Body mass index is 40.08 kg/m².      Intake/Output Summary (Last 24 hours) at 12/14/2024 1630  Last data filed at 12/14/2024 1414  Gross per 24 hour   Intake 1351.67 ml   Output --   Net 1351.67 ml          Physical Exam  Vitals and nursing note reviewed.   Constitutional:       General: He is awake. He is not in acute distress.     Appearance: He  is obese. He is ill-appearing. He is not toxic-appearing.   HENT:      Head: Normocephalic and atraumatic.   Eyes:      Extraocular Movements: Extraocular movements intact.      Conjunctiva/sclera: Conjunctivae normal.   Cardiovascular:      Rate and Rhythm: Normal rate.   Pulmonary:      Effort: Pulmonary effort is normal. No respiratory distress.   Abdominal:      General: There is distension.      Palpations: Abdomen is soft.      Tenderness: There is no abdominal tenderness. There is no guarding or rebound.   Musculoskeletal:         General: No swelling or tenderness.      Right lower leg: Edema present.      Left lower leg: Edema present.      Comments: 2+ pitting edema in bilateral lower extremities   Skin:     General: Skin is warm.      Coloration: Skin is not jaundiced.      Findings: Bruising present.   Neurological:      Mental Status: He is alert and oriented to person, place, and time.      Motor: No weakness.            Vents:     Lines/Drains/Airways       Central Venous Catheter Line  Duration             Trialysis (Dialysis) Catheter 11/24/24 0441 right internal jugular 20 days              Peripheral Intravenous Line  Duration                  Peripheral IV - Single Lumen 20 G Anterior;Distal;Left Forearm -- days                  Significant Labs:    CBC/Anemia Profile:  Recent Labs   Lab 12/13/24  0258 12/14/24  0517   WBC 7.38 5.48   HGB 7.5* 6.8*   HCT 22.0* 21.2*   PLT 29* 40*   MCV 79* 82   RDW 25.5* 26.1*        Chemistries:  Recent Labs   Lab 12/13/24  0258 12/13/24  1406 12/14/24  0517   *  131*  131*  131*  131* 130*  130* 131*  131*  131*   K 3.6  3.6  3.6  3.6  3.6 3.4*  3.4* 3.4*  3.4*  3.4*     105  105  105  105 105  105 104  104  104   CO2 19*  19*  19*  19*  19* 16*  16* 19*  19*  20*   BUN 10  10  10  10  10 12  12 13  13  13   CREATININE 2.5*  2.5*  2.5*  2.5*  2.5* 2.9*  2.9* 3.5*  3.5*  3.5*   CALCIUM 8.4*  8.3*  8.3*   8.3*  8.3* 8.4*  8.4* 8.6*  8.6*  8.5*   ALBUMIN 2.2*  2.2*  2.2*  2.2*  2.2* 2.3*  2.3* 2.2*  2.2*  2.2*   PROT 5.4*  --  5.2*   BILITOT 2.9*  --  2.9*   ALKPHOS 71  --  78   ALT 15  --  16   AST 24  --  38   MG 1.9  1.9  1.9  1.9 2.0  2.0 2.0  2.0   PHOS 3.2  3.3  3.3  3.3  3.3 2.8  2.8 3.9  3.9  3.8       All pertinent labs within the past 24 hours have been reviewed.    Significant Imaging:  I have reviewed all pertinent imaging results/findings within the past 24 hours.    Assessment and Plan     * Decompensated cirrhosis  Volume Overload  Etoh Cirrhosis  Hx of HCC s/p Y90    MELD 3.0: 32 at 12/14/2024  5:17 AM  MELD-Na: 31 at 12/14/2024  5:17 AM  Calculated from:  Serum Creatinine: 3.5 mg/dL (Using max of 3 mg/dL) at 12/14/2024  5:17 AM  Serum Sodium: 131 mmol/L at 12/14/2024  5:17 AM  Total Bilirubin: 2.9 mg/dL at 12/14/2024  5:17 AM  Serum Albumin: 2.2 g/dL at 12/14/2024  5:17 AM  INR(ratio): 1.8 at 12/14/2024  5:17 AM  Age at listing (hypothetical): 69 years  Sex: Male at 12/14/2024  5:17 AM    Cont lactulose  Daily CMP  Per hepatology, pt is not a transplant candidate at this time    Adrenal insufficiency  On going problem since admission, most likely multifactorial - component of liver dysfunction, HRS, sepsis on admission.  Continues to require levo, midodrine has been up titrated.   Random cortisol was 6   Persistent hypotension thought to be adrenal insufficiency.   Patient underwent testing with cosyntropin stimulation test, results are not compatible with adrenal insufficiency  Cont hydrocortisone taper      Moderate protein-calorie malnutrition  Nutrition consulted. Most recent weight and BMI monitored-     Measurements:  Wt Readings from Last 1 Encounters:   12/13/24 (!) 137.8 kg (303 lb 12.7 oz)   Body mass index is 40.08 kg/m².    Patient has been screened and assessed by RD.    Malnutrition Type:  Context: acute illness or injury  Level: moderate    Malnutrition  Characteristic Summary:  Energy Intake (Malnutrition): less than 75% for greater than 7 days  Subcutaneous Fat (Malnutrition): mild depletion  Muscle Mass (Malnutrition): mild depletion  Fluid Accumulation (Malnutrition): moderate to severe    Interventions/Recommendations (treatment strategy):  1.)      Hypotension        Edema  Volume mgmt with RRT      Debility  Patient with Acute on chronic debility due to chronic unspecified fatigue, age-related physical debility, and other reduced mobility. The patient's latest AMPAC (Activity Measure for Post Acute Care) Score is listed below.    AM-PAC Score - How much help does the patient need for each activity listed  Basic Mobility Total Score: 17  Turning over in bed (including adjusting bedclothes, sheets and blankets)?: A little  Sitting down on and standing up from a chair with arms (e.g., wheelchair, bedside commode, etc.): A little  Moving from lying on back to sitting on the side of the bed?: A little  Moving to and from a bed to a chair (including a wheelchair)?: A little  Need to walk in hospital room?: A little  Climbing 3-5 steps with a railing?: A lot    Plan  - Progressive mobility protocol initated  - PT/OT consulted  - Fall precautions in place  - PT/OT recommending LI therapy. Patient interested in SNF.       Advanced care planning/counseling discussion        Palliative care encounter  Goals of care, counseling/discussion  Advance Care Planning  Patient requests FULL CODE status and would like to continue current treatment      Abdominal pain        Gram-negative bacteremia  Blood cultures from admission with B. Diminuta, micrococcus luteus, lysinobacillus species. Seen by ID previously who recommended stopping antibiotics due to concern these were contaminants. Repeat blood cultures have been no growth. Has since been transferred to ICU with increased pressor requirement. Blood cultures repeated on 11/24 are no growth x 24 hours.   11/29 Switched from  zosyn to meropenem due to concern of thrombocytopenia.   Finished meropenem course 12/8 @ 6 PM      Encephalopathy, metabolic  2/2 decompensated cirrhosis and JAE  Resolved    Hyponatremia  Hyponatremia is likely due to Cirrhosis. The patient's most recent sodium results are listed below.  Recent Labs     12/13/24  0258 12/13/24  1406 12/14/24  0517   *  131*  131*  131*  131* 130*  130* 131*  131*  131*       Plan  - Correct the sodium by 4-6mEq in 24 hours.   - Appreciate nephrology recommendations  - Monitor sodium daily  - Patient hyponatremia is stable      Thrombocytopenia  The likely etiology of thrombocytopenia is liver disease. The patients 3 most recent labs are listed below.  Recent Labs     12/12/24  0331 12/13/24  0258 12/14/24  0517   PLT 23* 29* 40*       Plan  - Will transfuse if platelet count is <50k (if undergoing surgical procedure or have active bleeding).      Microcytic anemia  Anemia is likely due to chronic disease due to Chronic liver disease. Most recent hemoglobin and hematocrit are listed below.  Recent Labs     12/12/24  0331 12/13/24  0258 12/14/24  0517   HGB 7.1* 7.5* 6.8*   HCT 21.5* 22.0* 21.2*       Plan  - Monitor serial CBC: Daily  - Transfuse PRBC if patient becomes hemodynamically unstable, symptomatic or H/H drops below 7/21.  - Patient has received 1 units of PRBCs on 11/20 and 1 PRBC on 11/21  - Patient's anemia is currently stable  - Hb baseline 7-8. No obvious bleeding  - Eliquis held on admission.  DVT prophylaxis held.  - Transfuse 1u pRBC today. Cont to monitor H/H. No obvious bleeding.    Stage 3a chronic kidney disease  Nephrology following  Will try to remove more fluid  Strict intake and output  Renally dose all medications  Avoid nephrotoxic agents  Interventional nephrology consulted for tunneled cath for HD, plan for 12/16    Severe sepsis  This patient does have evidence of infective focus  My overall impression is sepsis.  Source: Abdominal      Organ dysfunction indicated by Acute kidney injury     Fluid challenge Contraindicated- Fluid bolus is contraindicated in this patient due to End Stage Liver Disease      Post- resuscitation assessment No - Post resuscitation assessment not needed      Source control achieved by: antibiotics  Patient finished course of abx (meropenem) 12/8      JAE (acute kidney injury)  Baseline creatinine is  1.5 . Most recent creatinine and eGFR are listed below.    Recent Labs     12/13/24  0258 12/13/24  1406 12/14/24  0517   CREATININE 2.5*  2.5*  2.5*  2.5*  2.5* 2.9*  2.9* 3.5*  3.5*  3.5*   EGFRNORACEVR 26.8*  26.8*  26.8*  26.8*  26.8* 22.4*  22.4* 17.9*  17.9*  17.9*        Plan  - JAE is worsening. Will continue current treatment  - Avoid nephrotoxins and renally dose meds for GFR listed above  - Monitor urine output, serial BMP, and adjust therapy as needed    - Etiology includes volume overload, obstruction, hypotension, possible HRS  Baseline creatinine is 1.5. May require dialysis long term.   Patient tolerated intermittent HD well (12/12)  Avoid nephrotoxins and renally dose meds for GFR listed above  Monitor urine output, serial BMP, and adjust therapy as needed  Interventional nephrology consulted for placement of tunneled cath, planning for 12/16    Atrial fibrillation  Patient has persistent (7 days or more) atrial fibrillation. Patient is currently in atrial fibrillation. [id number] Score: 1. The patients heart rate in the last 24 hours is as follows:  Pulse  Min: 66  Max: 79       Plan  - Patient's afib is currently controlled  Holding home Eliquis in the setting of recent low blood counts  Continue metoprolol 12.5 mg BID when able    Coagulopathy  2/2 cirrhosis. See plan below    Recent Labs   Lab 12/14/24  0517   INR 1.8*   APTT 39.7*     Received 3 doses of IV vitamin K.  End Stage Liver Disease precipitated coagulopathy  Heparin d/c d/t thrombocytopenia  CTM        Goals of care,  counseling/discussion  Advance Care Planning    Code Status  In light of the patients advanced and life limiting illness,I engaged the the patient and family in a voluntary conversation about the patient's preferences for care  at the very end of life. The patient wishes to have a natural, peaceful death.  Along those lines, the patient wishes to have CPR or other invasive treatments performed when his heart and/or breathing stops. I communicated to the patient and family. Continue FULL CODE.    A total of 15 min was spent on advance care planning, goals of care discussion, emotional support, formulating and communicating prognosis and exploring burden/benefit of various approaches of treatment. This discussion occurred on a fully voluntary basis with the verbal consent of the patient and/or family.           VTE Risk Mitigation (From admission, onward)           Ordered     Place sequential compression device  Until discontinued         11/22/24 1329     IP VTE HIGH RISK PATIENT  Once         11/20/24 1622                    Discharge Planning   AUTUMN: 12/20/2024     Code Status: Full Code   Medical Readiness for Discharge Date:   Discharge Plan A: Skilled Nursing Facility   Discharge Delays: None known at this time            Please place Justification for DME        Giovani Wheat MD  Department of Hospital Medicine   [facility] - Transplant Step[facility]

## 2024-12-14 NOTE — SUBJECTIVE & OBJECTIVE
Interval History/Significant Events: GEORGIE TAVERA. Hb 6.8 this AM, no obvious bleeding.     Review of Systems  Objective:     Vital Signs (Most Recent):  Temp: 98 °F (36.7 °C) (12/14/24 1530)  Pulse: 77 (12/14/24 1530)  Resp: 14 (12/14/24 1530)  BP: (!) 97/49 (12/14/24 1530)  SpO2: (!) 93 % (12/14/24 1530) Vital Signs (24h Range):  Temp:  [97.5 °F (36.4 °C)-98.6 °F (37 °C)] 98 °F (36.7 °C)  Pulse:  [66-79] 77  Resp:  [14-18] 14  SpO2:  [90 %-100 %] 93 %  BP: ()/(49-62) 97/49   Weight: (!) 137.8 kg (303 lb 12.7 oz)  Body mass index is 40.08 kg/m².      Intake/Output Summary (Last 24 hours) at 12/14/2024 1630  Last data filed at 12/14/2024 1414  Gross per 24 hour   Intake 1351.67 ml   Output --   Net 1351.67 ml          Physical Exam  Vitals and nursing note reviewed.   Constitutional:       General: He is awake. He is not in acute distress.     Appearance: He is obese. He is ill-appearing. He is not toxic-appearing.   HENT:      Head: Normocephalic and atraumatic.   Eyes:      Extraocular Movements: Extraocular movements intact.      Conjunctiva/sclera: Conjunctivae normal.   Cardiovascular:      Rate and Rhythm: Normal rate.   Pulmonary:      Effort: Pulmonary effort is normal. No respiratory distress.   Abdominal:      General: There is distension.      Palpations: Abdomen is soft.      Tenderness: There is no abdominal tenderness. There is no guarding or rebound.   Musculoskeletal:         General: No swelling or tenderness.      Right lower leg: Edema present.      Left lower leg: Edema present.      Comments: 2+ pitting edema in bilateral lower extremities   Skin:     General: Skin is warm.      Coloration: Skin is not jaundiced.      Findings: Bruising present.   Neurological:      Mental Status: He is alert and oriented to person, place, and time.      Motor: No weakness.            Vents:     Lines/Drains/Airways       Central Venous Catheter Line  Duration             Trialysis (Dialysis) Catheter  11/24/24 0441 right internal jugular 20 days              Peripheral Intravenous Line  Duration                  Peripheral IV - Single Lumen 20 G Anterior;Distal;Left Forearm -- days                  Significant Labs:    CBC/Anemia Profile:  Recent Labs   Lab 12/13/24 0258 12/14/24  0517   WBC 7.38 5.48   HGB 7.5* 6.8*   HCT 22.0* 21.2*   PLT 29* 40*   MCV 79* 82   RDW 25.5* 26.1*        Chemistries:  Recent Labs   Lab 12/13/24 0258 12/13/24  1406 12/14/24  0517   *  131*  131*  131*  131* 130*  130* 131*  131*  131*   K 3.6  3.6  3.6  3.6  3.6 3.4*  3.4* 3.4*  3.4*  3.4*     105  105  105  105 105  105 104  104  104   CO2 19*  19*  19*  19*  19* 16*  16* 19*  19*  20*   BUN 10  10  10  10  10 12  12 13  13  13   CREATININE 2.5*  2.5*  2.5*  2.5*  2.5* 2.9*  2.9* 3.5*  3.5*  3.5*   CALCIUM 8.4*  8.3*  8.3*  8.3*  8.3* 8.4*  8.4* 8.6*  8.6*  8.5*   ALBUMIN 2.2*  2.2*  2.2*  2.2*  2.2* 2.3*  2.3* 2.2*  2.2*  2.2*   PROT 5.4*  --  5.2*   BILITOT 2.9*  --  2.9*   ALKPHOS 71  --  78   ALT 15  --  16   AST 24  --  38   MG 1.9  1.9  1.9  1.9 2.0  2.0 2.0  2.0   PHOS 3.2  3.3  3.3  3.3  3.3 2.8  2.8 3.9  3.9  3.8       All pertinent labs within the past 24 hours have been reviewed.    Significant Imaging:  I have reviewed all pertinent imaging results/findings within the past 24 hours.

## 2024-12-14 NOTE — NURSING
Arrived on unit @1630.  VSS, Meal tray brought with him, Patient sitting up in bed, on tele, bed low and SR up x3, call light and belongings w/n reach. Urinal at bedside.

## 2024-12-14 NOTE — ASSESSMENT & PLAN NOTE
Goals of care, counseling/discussion  Advance Care Planning  Patient requests FULL CODE status and would like to continue current treatment

## 2024-12-14 NOTE — SUBJECTIVE & OBJECTIVE
ROS:    Review of Systems   Respiratory:  Negative for shortness of breath.    Cardiovascular:  Positive for leg swelling.   Gastrointestinal:  Positive for abdominal distention. Negative for nausea and vomiting.   Endocrine: Negative.    Musculoskeletal: Negative.    Skin:  Positive for wound.   Neurological: Negative.    Psychiatric/Behavioral: Negative.         Labs Reviewed and Include:    Recent Labs   Lab 12/14/24  0517   GLU 96  96  98   CALCIUM 8.6*  8.6*  8.5*   ALBUMIN 2.2*  2.2*  2.2*   PROT 5.2*   *  131*  131*   K 3.4*  3.4*  3.4*   CO2 19*  19*  20*     104  104   BUN 13  13  13   CREATININE 3.5*  3.5*  3.5*   ALKPHOS 78   ALT 16   AST 38   BILITOT 2.9*     Lab Results   Component Value Date    HGBA1C 4.5 12/13/2022       Nutritional status:   Body mass index is 40.08 kg/m².  Lab Results   Component Value Date    ALBUMIN 2.2 (L) 12/14/2024    ALBUMIN 2.2 (L) 12/14/2024    ALBUMIN 2.2 (L) 12/14/2024        PHYSICAL EXAMINATION:  Vitals:    12/14/24 1123   BP: (!) 109/57   Pulse: 72   Resp: 14   Temp: 97.9 °F (36.6 °C)     Body mass index is 40.08 kg/m².     Physical Exam  Vitals reviewed.   Constitutional:       Appearance: Normal appearance.   Eyes:      Extraocular Movements: Extraocular movements intact.      Conjunctiva/sclera: Conjunctivae normal.   Cardiovascular:      Rate and Rhythm: Normal rate and regular rhythm.   Pulmonary:      Effort: Pulmonary effort is normal. No respiratory distress.   Musculoskeletal:      Right lower leg: Edema present.      Left lower leg: Edema present.   Skin:     Comments: Wounds on bl arms covered by dressing   Neurological:      Mental Status: He is alert and oriented to person, place, and time. Mental status is at baseline.   Psychiatric:         Mood and Affect: Mood normal.         Behavior: Behavior normal.

## 2024-12-14 NOTE — ASSESSMENT & PLAN NOTE
Nutrition consulted. Most recent weight and BMI monitored-     Measurements:  Wt Readings from Last 1 Encounters:   12/13/24 (!) 137.8 kg (303 lb 12.7 oz)   Body mass index is 40.08 kg/m².    Patient has been screened and assessed by RD.    Malnutrition Type:  Context: acute illness or injury  Level: moderate    Malnutrition Characteristic Summary:  Energy Intake (Malnutrition): less than 75% for greater than 7 days  Subcutaneous Fat (Malnutrition): mild depletion  Muscle Mass (Malnutrition): mild depletion  Fluid Accumulation (Malnutrition): moderate to severe    Interventions/Recommendations (treatment strategy):  1.)

## 2024-12-14 NOTE — ASSESSMENT & PLAN NOTE
On going problem since admission, most likely multifactorial - component of liver dysfunction, HRS, sepsis on admission.  Continues to require levo, midodrine has been up titrated.   Random cortisol was 6   Persistent hypotension thought to be adrenal insufficiency.   Patient underwent testing with cosyntropin stimulation test, results are not compatible with adrenal insufficiency  Cont hydrocortisone taper

## 2024-12-14 NOTE — ASSESSMENT & PLAN NOTE
The likely etiology of thrombocytopenia is liver disease. The patients 3 most recent labs are listed below.  Recent Labs     12/12/24  0331 12/13/24  0258 12/14/24  0517   PLT 23* 29* 40*       Plan  - Will transfuse if platelet count is <50k (if undergoing surgical procedure or have active bleeding).

## 2024-12-14 NOTE — ASSESSMENT & PLAN NOTE
2/2 cirrhosis. See plan below    Recent Labs   Lab 12/14/24  0517   INR 1.8*   APTT 39.7*     Received 3 doses of IV vitamin K.  End Stage Liver Disease precipitated coagulopathy  Heparin d/c d/t thrombocytopenia  CTM

## 2024-12-14 NOTE — PLAN OF CARE
AAOx4. VSS. HR is a fib (rate controlled - 70s) on telemetry. Patient assisted w/ turning/shifting weight throughout shift w/ 1-2x assist. H/H 6.8/21.2 (decreased from 7.5/22.0 prior) on AM labs - 1 unit PRBCs transfused this shift - patient tolerated well. Repeat CBC ordered for 1700 this evening. K 3.4 - 20mEq PO replacements given x1 this shift. Cr 3.5 (increased from 2.9 prior). Nephrology following - see note. Patient is oliguric - external catheter in place to wall suction, but no urine production all shift. Bladder scan performed and showed 220cc -  Hand notified and verbal orders received to straight cath patient. 250cc dark susan urine from straight cath -  Hand notified and stated to repeat bladder scan in 6hrs at 2300 tonight. Triad applied to sacrum/buttocks PRN. Endocrinology consulted for adrenal insufficiency. Social work consulted for SNF. Bed in low position. Call light within reach.

## 2024-12-14 NOTE — ASSESSMENT & PLAN NOTE
Patient has persistent (7 days or more) atrial fibrillation. Patient is currently in atrial fibrillation. [id number] Score: 1. The patients heart rate in the last 24 hours is as follows:  Pulse  Min: 66  Max: 79       Plan  - Patient's afib is currently controlled  Holding home Eliquis in the setting of recent low blood counts  Continue metoprolol 12.5 mg BID when able

## 2024-12-14 NOTE — ASSESSMENT & PLAN NOTE
Hyponatremia is likely due to Cirrhosis. The patient's most recent sodium results are listed below.  Recent Labs     12/13/24  0258 12/13/24  1406 12/14/24  0517   *  131*  131*  131*  131* 130*  130* 131*  131*  131*       Plan  - Correct the sodium by 4-6mEq in 24 hours.   - Appreciate nephrology recommendations  - Monitor sodium daily  - Patient hyponatremia is stable

## 2024-12-14 NOTE — HPI
Mr. Yoo is a 69-year-old man with a history of decompensated cirrhosis, atrial fibrillation and hypertension who presented to the hospital due to severe swelling on his upper and lower extremities as well as his abdomen.  He has previously been hospitalized for volume overload and tells us that he has been compliant with his diuretic regimen.  During this hospitalization he was found to be bacteremic with Gram-positive cocci and Gram-negative rods, also admitted to the ICU due to pressor requirement to maintain normal blood pressure levels.  He was given albumin and was on norepinephrine, which was subsequently titrated off and patient was started on midodrine.  Endocrinology was consulted d/t concern for adrenal insufficiency.    Of note, when patient had low blood pressure he was started on stress dose steroids which were discontinued within a few days.  Random cortisol testing showed a cortisol that was perceived to be low, patient underwent cosyntropin stimulation testing and stimmed to 16. Patient was given a diagnosis of adrenal insufficiency and started on hydrocortisone 15/10 which he is still currently taking.  His vital signs are stable and he denies history of signs or symptoms of adrenal insufficiency. He was not using steroids as an outpatient and was never diagnosed with AI.    Lab Results   Component Value Date    TSH 2.208 12/13/2022    TSH 1.928 11/29/2022    ACTH 14 12/05/2024    CORTISOL 16.50 12/05/2024    CORTISOL 14.50 12/05/2024    CORTISOL 8.90 12/05/2024    CORTISOL 6.90 12/04/2024     (L) 12/14/2024     (L) 12/14/2024     (L) 12/14/2024     (L) 12/13/2024     (L) 12/13/2024    K 3.4 (L) 12/14/2024    K 3.4 (L) 12/14/2024    K 3.4 (L) 12/14/2024    K 3.4 (L) 12/13/2024    K 3.4 (L) 12/13/2024    CALCIUM 8.5 (L) 12/14/2024    CALCIUM 8.6 (L) 12/14/2024    CALCIUM 8.6 (L) 12/14/2024    CALCIUM 8.4 (L) 12/13/2024    CALCIUM 8.4 (L) 12/13/2024    ALBUMIN 2.2 (L)  12/14/2024    ALBUMIN 2.2 (L) 12/14/2024    ALBUMIN 2.2 (L) 12/14/2024    ALBUMIN 2.3 (L) 12/13/2024    ALBUMIN 2.3 (L) 12/13/2024    ESTGFRAFRICA >60.0 06/13/2022    ESTGFRAFRICA >60.0 05/31/2022    ESTGFRAFRICA >60.0 04/01/2022    ESTGFRAFRICA >60.0 11/20/2019    EGFRNONAA >60.0 06/13/2022    EGFRNONAA >60.0 05/31/2022    EGFRNONAA >60.0 04/01/2022    EGFRNONAA >60.0 11/20/2019

## 2024-12-14 NOTE — ASSESSMENT & PLAN NOTE
Anemia is likely due to chronic disease due to Chronic liver disease. Most recent hemoglobin and hematocrit are listed below.  Recent Labs     12/12/24  0331 12/13/24  0258 12/14/24  0517   HGB 7.1* 7.5* 6.8*   HCT 21.5* 22.0* 21.2*       Plan  - Monitor serial CBC: Daily  - Transfuse PRBC if patient becomes hemodynamically unstable, symptomatic or H/H drops below 7/21.  - Patient has received 1 units of PRBCs on 11/20 and 1 PRBC on 11/21  - Patient's anemia is currently stable  - Hb baseline 7-8. No obvious bleeding  - Eliquis held on admission.  DVT prophylaxis held.

## 2024-12-14 NOTE — PLAN OF CARE
Plan of care reviewed with the patient at the beginning of the shift. Pt admitted with ESLD secondary to etoh cirrhosis. Pt remains aaox4. Pt on scheduled lactulose. Two bm's overnight. Pt with multiple skin tears, mepilex on both forearms. Skin is jaundice and ecchymotic. Abdomen distended and taut. Perineum is excoriated. Triad applied. Wound care consulted for suspected skin breakdown identified after ICU transfer. Pt has been on HD, plan for line placement Monday. Fall precautions maintained. Pt remained free from falls and injury this shift. Bed locked in lowest position, side rails up x2, call light within reach. Instructed pt to call for assistance as needed. Pt verbalized understanding. Vitals stable. Pt afebrile overnight. No acute issues overnight. Will continue to monitor.

## 2024-12-15 LAB
ALBUMIN SERPL BCP-MCNC: 2.2 G/DL (ref 3.5–5.2)
ALP SERPL-CCNC: 74 U/L (ref 40–150)
ALT SERPL W/O P-5'-P-CCNC: 16 U/L (ref 10–44)
ANION GAP SERPL CALC-SCNC: 7 MMOL/L (ref 8–16)
AST SERPL-CCNC: 36 U/L (ref 10–40)
BASOPHILS # BLD AUTO: 0.03 K/UL (ref 0–0.2)
BASOPHILS NFR BLD: 0.5 % (ref 0–1.9)
BILIRUB SERPL-MCNC: 3.9 MG/DL (ref 0.1–1)
BUN SERPL-MCNC: 18 MG/DL (ref 8–23)
CALCIUM SERPL-MCNC: 8.5 MG/DL (ref 8.7–10.5)
CHLORIDE SERPL-SCNC: 103 MMOL/L (ref 95–110)
CO2 SERPL-SCNC: 19 MMOL/L (ref 23–29)
CREAT SERPL-MCNC: 3.8 MG/DL (ref 0.5–1.4)
DIFFERENTIAL METHOD BLD: ABNORMAL
EOSINOPHIL # BLD AUTO: 0.1 K/UL (ref 0–0.5)
EOSINOPHIL NFR BLD: 1.8 % (ref 0–8)
ERYTHROCYTE [DISTWIDTH] IN BLOOD BY AUTOMATED COUNT: 25.5 % (ref 11.5–14.5)
EST. GFR  (NO RACE VARIABLE): 16.2 ML/MIN/1.73 M^2
GLUCOSE SERPL-MCNC: 93 MG/DL (ref 70–110)
HCT VFR BLD AUTO: 22.8 % (ref 40–54)
HGB BLD-MCNC: 7.3 G/DL (ref 14–18)
IMM GRANULOCYTES # BLD AUTO: 0.04 K/UL (ref 0–0.04)
IMM GRANULOCYTES NFR BLD AUTO: 0.7 % (ref 0–0.5)
INR PPP: 1.8 (ref 0.8–1.2)
LYMPHOCYTES # BLD AUTO: 0.6 K/UL (ref 1–4.8)
LYMPHOCYTES NFR BLD: 9.8 % (ref 18–48)
MCH RBC QN AUTO: 26.3 PG (ref 27–31)
MCHC RBC AUTO-ENTMCNC: 32 G/DL (ref 32–36)
MCV RBC AUTO: 82 FL (ref 82–98)
MONOCYTES # BLD AUTO: 0.9 K/UL (ref 0.3–1)
MONOCYTES NFR BLD: 16.3 % (ref 4–15)
NEUTROPHILS # BLD AUTO: 4 K/UL (ref 1.8–7.7)
NEUTROPHILS NFR BLD: 70.9 % (ref 38–73)
NRBC BLD-RTO: 0 /100 WBC
PHOSPHATE SERPL-MCNC: 4.3 MG/DL (ref 2.7–4.5)
PLATELET # BLD AUTO: 48 K/UL (ref 150–450)
PMV BLD AUTO: ABNORMAL FL (ref 9.2–12.9)
POTASSIUM SERPL-SCNC: 3.7 MMOL/L (ref 3.5–5.1)
PROT SERPL-MCNC: 5.3 G/DL (ref 6–8.4)
PROTHROMBIN TIME: 18.8 SEC (ref 9–12.5)
RBC # BLD AUTO: 2.78 M/UL (ref 4.6–6.2)
SODIUM SERPL-SCNC: 129 MMOL/L (ref 136–145)
WBC # BLD AUTO: 5.7 K/UL (ref 3.9–12.7)

## 2024-12-15 PROCEDURE — 27000923 HC TRIALYSIS CATHETER, ANY SIZE

## 2024-12-15 PROCEDURE — 36415 COLL VENOUS BLD VENIPUNCTURE: CPT

## 2024-12-15 PROCEDURE — 84100 ASSAY OF PHOSPHORUS: CPT

## 2024-12-15 PROCEDURE — 25000003 PHARM REV CODE 250: Performed by: INTERNAL MEDICINE

## 2024-12-15 PROCEDURE — 80053 COMPREHEN METABOLIC PANEL: CPT

## 2024-12-15 PROCEDURE — 25000003 PHARM REV CODE 250

## 2024-12-15 PROCEDURE — 85025 COMPLETE CBC W/AUTO DIFF WBC: CPT

## 2024-12-15 PROCEDURE — 85610 PROTHROMBIN TIME: CPT | Performed by: INTERNAL MEDICINE

## 2024-12-15 PROCEDURE — 20600001 HC STEP DOWN PRIVATE ROOM

## 2024-12-15 RX ORDER — TAMSULOSIN HYDROCHLORIDE 0.4 MG/1
0.8 CAPSULE ORAL NIGHTLY
Status: DISCONTINUED | OUTPATIENT
Start: 2024-12-15 | End: 2024-12-20

## 2024-12-15 RX ADMIN — ALUMINUM HYDROXIDE, MAGNESIUM HYDROXIDE, AND SIMETHICONE 30 ML: 200; 200; 20 SUSPENSION ORAL at 09:12

## 2024-12-15 RX ADMIN — MIDODRINE HYDROCHLORIDE 20 MG: 5 TABLET ORAL at 05:12

## 2024-12-15 RX ADMIN — MIDODRINE HYDROCHLORIDE 20 MG: 5 TABLET ORAL at 08:12

## 2024-12-15 RX ADMIN — LACTULOSE 30 G: 20 SOLUTION ORAL at 03:12

## 2024-12-15 RX ADMIN — LACTULOSE 30 G: 20 SOLUTION ORAL at 08:12

## 2024-12-15 RX ADMIN — METOPROLOL TARTRATE 12.5 MG: 25 TABLET, FILM COATED ORAL at 10:12

## 2024-12-15 RX ADMIN — MIDODRINE HYDROCHLORIDE 20 MG: 5 TABLET ORAL at 03:12

## 2024-12-15 RX ADMIN — LACTULOSE 30 G: 20 SOLUTION ORAL at 09:12

## 2024-12-15 RX ADMIN — HYDROCORTISONE 10 MG: 5 TABLET ORAL at 03:12

## 2024-12-15 RX ADMIN — Medication 6 MG: at 08:12

## 2024-12-15 RX ADMIN — HYDROCORTISONE 10 MG: 5 TABLET ORAL at 05:12

## 2024-12-15 RX ADMIN — TAMSULOSIN HYDROCHLORIDE 0.8 MG: 0.4 CAPSULE ORAL at 08:12

## 2024-12-15 NOTE — ASSESSMENT & PLAN NOTE
Baseline creatinine is  1.5 . Most recent creatinine and eGFR are listed below.    Recent Labs     12/13/24  1406 12/14/24  0517 12/15/24  0520   CREATININE 2.9*  2.9* 3.5*  3.5*  3.5* 3.8*   EGFRNORACEVR 22.4*  22.4* 17.9*  17.9*  17.9* 16.2*        Plan  - JAE is worsening. Will continue current treatment  - Avoid nephrotoxins and renally dose meds for GFR listed above  - Monitor urine output, serial BMP, and adjust therapy as needed    - Etiology includes volume overload, obstruction, hypotension, possible HRS  Baseline creatinine is 1.5. May require dialysis long term.   Patient tolerated intermittent HD well (12/12)  Avoid nephrotoxins and renally dose meds for GFR listed above  Monitor urine output, serial BMP, and adjust therapy as needed  Interventional nephrology consulted for placement of tunneled cath, planning for 12/16

## 2024-12-15 NOTE — PLAN OF CARE
Plan of care reviewed with the patient at the beginning of the shift. Pt admitted with ESLD secondary to etoh cirrhosis. Pt remains aaox4. Pt on scheduled lactulose. Two bm's overnight. Pt with multiple skin tears, mepilex on both forearms. Seen by wound care. Skin is jaundice and ecchymotic. Abdomen distended and taut. Perineum is excoriated. Triad applied. Pt has been on HD, plan for line placement Monday. Pt is oliguric. In and out done during the previous shift. Ordered to repeat it at 2300 and it was 41ml. Fall precautions maintained. Pt remained free from falls and injury this shift. Bed locked in lowest position, side rails up x2, call light within reach. Instructed pt to call for assistance as needed. Pt verbalized understanding. Vitals stable. Pt afebrile overnight. No acute issues overnight. Will continue to monitor.

## 2024-12-15 NOTE — ASSESSMENT & PLAN NOTE
Hyponatremia is likely due to Cirrhosis. The patient's most recent sodium results are listed below.  Recent Labs     12/13/24  1406 12/14/24  0517 12/15/24  0520   *  130* 131*  131*  131* 129*       Plan  - Correct the sodium by 4-6mEq in 24 hours.   - Appreciate nephrology recommendations  - Monitor sodium daily  - Patient hyponatremia is stable

## 2024-12-15 NOTE — ASSESSMENT & PLAN NOTE
The likely etiology of thrombocytopenia is liver disease. The patients 3 most recent labs are listed below.  Recent Labs     12/14/24  0517 12/14/24  1616 12/15/24  0520   PLT 40* 47* 48*       Plan  - Will transfuse if platelet count is <50k (if undergoing surgical procedure or have active bleeding).

## 2024-12-15 NOTE — PLAN OF CARE
Admitted with ESLD secondary to etoh cirrhosis./ sepsis requrring pressors and CRRT.   AAo x4. 1 large BM today - on scheduled lactulose. Pt with multiple skin tears, foams changed to both arms today   Skin is jaundice and ecchymotic. Abdomen distended and taut. Perineum is excoriated. Triad applied. 3+ LE edema and scrotal edema Tolerated HD 0- npo after mn  for line placement Monday and HD . Pt is oliguric.- voided x1 today when had BM.   2 person assist up to walker - walked to bathroom. Sat in  chair for 6 hrs  with legs elevated. PT/OT cx.  Fall precautions maintained. Pt remained free from falls and injury this shift. Bariatric bed in room now.  side rails up x2, call light within reach. Instructed pt to call for assistance as needed. Pt verbalized understanding. Vitals stable. VSS- on scheduled midodrine.  Plan for SNF cx with HD chair set up - tentative dc later this week

## 2024-12-15 NOTE — SUBJECTIVE & OBJECTIVE
Interval History/Significant Events: NAEON. Episode of urinary retention requiring straight cath yesterday, no further retention noted since. Appropriate response to pRBC transfusion, no obvious bleeding. Pt working well with PT/OT today. Planning for TDC placement tomorrow.     Review of Systems  Objective:     Vital Signs (Most Recent):  Temp: 98.2 °F (36.8 °C) (12/15/24 1202)  Pulse: 72 (12/15/24 1202)  Resp: 14 (12/15/24 1202)  BP: (!) 99/51 (12/15/24 1202)  SpO2: (!) 94 % (12/15/24 1202) Vital Signs (24h Range):  Temp:  [97.8 °F (36.6 °C)-98.6 °F (37 °C)] 98.2 °F (36.8 °C)  Pulse:  [66-77] 72  Resp:  [14-18] 14  SpO2:  [93 %-99 %] 94 %  BP: ()/(49-57) 99/51   Weight: (!) 137.8 kg (303 lb 12.7 oz)  Body mass index is 40.08 kg/m².      Intake/Output Summary (Last 24 hours) at 12/15/2024 1225  Last data filed at 12/15/2024 1008  Gross per 24 hour   Intake 928.67 ml   Output 250 ml   Net 678.67 ml          Physical Exam  Vitals and nursing note reviewed.   Constitutional:       General: He is awake. He is not in acute distress.     Appearance: He is obese. He is ill-appearing. He is not toxic-appearing.   HENT:      Head: Normocephalic and atraumatic.   Eyes:      Extraocular Movements: Extraocular movements intact.      Conjunctiva/sclera: Conjunctivae normal.   Cardiovascular:      Rate and Rhythm: Normal rate.   Pulmonary:      Effort: Pulmonary effort is normal. No respiratory distress.   Abdominal:      General: There is distension.      Palpations: Abdomen is soft.      Tenderness: There is no abdominal tenderness. There is no guarding or rebound.   Musculoskeletal:         General: No swelling or tenderness.      Right lower leg: Edema present.      Left lower leg: Edema present.      Comments: 2+ pitting edema in bilateral lower extremities   Skin:     General: Skin is warm.      Coloration: Skin is not jaundiced.      Findings: Bruising present.   Neurological:      Mental Status: He is alert and  oriented to person, place, and time.      Motor: No weakness.            Vents:     Lines/Drains/Airways       Central Venous Catheter Line  Duration             Trialysis (Dialysis) Catheter 11/24/24 0441 right internal jugular 21 days              Peripheral Intravenous Line  Duration                  Peripheral IV - Single Lumen 20 G Anterior;Distal;Left Forearm -- days                  Significant Labs:    CBC/Anemia Profile:  Recent Labs   Lab 12/14/24  0517 12/14/24  1616 12/15/24  0520   WBC 5.48 6.76 5.70   HGB 6.8* 7.9* 7.3*   HCT 21.2* 24.2* 22.8*   PLT 40* 47* 48*   MCV 82 83 82   RDW 26.1* 25.4* 25.5*        Chemistries:  Recent Labs   Lab 12/13/24  1406 12/14/24  0517 12/15/24  0520   *  130* 131*  131*  131* 129*   K 3.4*  3.4* 3.4*  3.4*  3.4* 3.7     105 104  104  104 103   CO2 16*  16* 19*  19*  20* 19*   BUN 12  12 13  13  13 18   CREATININE 2.9*  2.9* 3.5*  3.5*  3.5* 3.8*   CALCIUM 8.4*  8.4* 8.6*  8.6*  8.5* 8.5*   ALBUMIN 2.3*  2.3* 2.2*  2.2*  2.2* 2.2*   PROT  --  5.2* 5.3*   BILITOT  --  2.9* 3.9*   ALKPHOS  --  78 74   ALT  --  16 16   AST  --  38 36   MG 2.0  2.0 2.0  2.0  --    PHOS 2.8  2.8 3.9  3.9  3.8 4.3       All pertinent labs within the past 24 hours have been reviewed.    Significant Imaging:  I have reviewed all pertinent imaging results/findings within the past 24 hours.

## 2024-12-15 NOTE — ASSESSMENT & PLAN NOTE
Volume Overload  Etoh Cirrhosis  Hx of HCC s/p Y90    MELD 3.0: 34 at 12/15/2024  5:20 AM  MELD-Na: 33 at 12/15/2024  5:20 AM  Calculated from:  Serum Creatinine: 3.8 mg/dL (Using max of 3 mg/dL) at 12/15/2024  5:20 AM  Serum Sodium: 129 mmol/L at 12/15/2024  5:20 AM  Total Bilirubin: 3.9 mg/dL at 12/15/2024  5:20 AM  Serum Albumin: 2.2 g/dL at 12/15/2024  5:20 AM  INR(ratio): 1.8 at 12/15/2024  5:20 AM  Age at listing (hypothetical): 69 years  Sex: Male at 12/15/2024  5:20 AM    Cont lactulose  Daily CMP  Per hepatology, pt is not a transplant candidate at this time

## 2024-12-15 NOTE — PROGRESS NOTES
[facility] - Transplant [facility] Medicine  Progress Note    Patient Name: Juan Carlos Yoo Sr.  MRN: [id number]  Patient Class: IP- Inpatient   Admission Date: 11/20/2024  Length of Stay: 25 days  Attending Physician: Giovani Wheat MD  Primary Care Provider: Nyla Rios FNP        Subjective     Principal Problem:Decompensated cirrhosis        HPI:  69M with PMH of alcoholic cirrhosis, esophageal varices, Afib on eliquis, and HTN who presents for c/o worsening diffuse swelling as well as R arm pain/erythema. He was recently hospitalized 1 month ago at [facility] for volume overload in the setting of decompensated cirrhosis. He was treated with IV diuresis and discharged with adjustment to his diuretics, currently on lasix 60mg BID and metolazone 2.5mg every other week as per family. Patient reports diffuse swelling of his upper and lower extremities in addition to distended abdomen and worsening dyspnea, which he believes is due to recent medication adjustment. Family states he is non-compliant with low sodium diet and fluid restriction. Family states he has been compliant with diuretics, but rom't taken eliquis for 3 days due to issue getting refill. He also reports scratching right arm on door frame 3-4 days ago which has become red and painful after wrapping in in bandage. He claims to be compliant with medications, but is a poor historian. He follows with hepatology, not currently active on transplant list. He states he hasn't consumed alcohol for at least 9 months. Has c/o chills, but denies fever, cough, nausea, vomiting, chest pain, dysuria, hematuria, blood in stool. Workup in ED remarkable for VS: T 97.6 HR 94 RR 22 BP 95/56 O2 sat 97% on RA. Hb 6.7, MCV 75, Plt 81, PT/INR 22.6/2.2, Na 128, K 6.1, BUN/Cr 49/5.7, Alb 2.8, AST/ALT 35/9, Ammonia 104, , Trop neg, LA 2.9, CXR: Cardiac size is enlarged similar to prior.  No large volume of pleural fluid noted although there is mild blunting of  the right costophrenic angle which may relate to a small amount of pleural fluid.  Suspected right basilar opacity, to be correlated clinically for infection. RUE venous doppler: No thrombus in central veins of the right upper extremity. Patient received vanc/zosyn and lasix 80mg IVP in ED and will be admitted to hospital medicine service for further management.    Overview/Hospital Course:  69 y.o. male with history of EtOH use disorder, EtOH cirrhosis, HCC s/p y90, morbid obesity BMI 44, HTN, and Afib who presents with severe anasarca and JAE.      Appreciate hepatology and nephrology recommendations.  Diuretics were held because of concern for HRS.  Patient was started on albumin and midodrine per Nephrology recommendations for HRS.  Renal function continued to worsen and recommendation per Nephrology to transfer to ICU for maintaining goal map over 85 on Levophed.  ICU was notified and patient to be assessed to be transferred to ICU.     Patient also has Gram-positive cocci and Gram-negative rods in his blood now.  Possible sources could be got translocation and barrier related infection through skin as he has been significantly edematous and has been oozing.  Has been on vancomycin and Rocephin.  Id was consulted this morning.  Repeat blood cultures were obtained.     Continues to have anemia.  Hemoglobin dropped on 11/20 and was given 1 unit of packed red blood cells.  Did not respond appropriately.  Hemoglobin dropped again to 6.8 on 11/21 and was given 1 unit of packed red blood cells.  Hemoglobin again on 11/22 is 7.2.  No reported melena or hematochezia.  Patient was on Eliquis for atrial fibrillation prior to admission which was held.  Given history of esophageal varices and portal hypertensive gastropathy whereas also could be component of slow bleeding, under production due to CKD and hemolysis while also with decompensated cirrhosis.  Started on Protonix IV b.i.d. and octreotide.     Also clarified  with hepatology.  Given patient's current infection we will await ID recommendations however we will be challenging to consider transplant evaluation at this time.  Trend clinical course     Goal clarification with the patient and family.  Patient at this time wants to be full code and continue with Levophed in ICU.  They would consider discussion with palliative care in a day or so if things do not get better.      In MICU patient started on Levophed, however titration remained difficult given tachycardic response from the patient.      11/24 Trialysis and arterial lines placed overnight and currently on levo and norepi. SLED today.     11/25 Boo dc. Increased midodrine. Continue steroids until d/c pressors. Discussed with Nephrology about our concern for sustained use of pressors to achieve their MAP goals of 85. Will follow up tomorrow.      11/26 Platelets 48. Repeat 38 confirming thrombocytopenia. Concern for HIT. D/c heparin. Increased lactulose dosing. Bladder scan revealed urine in bladder. Boo placed. Off vaso. Continuing levo. Maintain MAP goals 80. PT/OT consulted.      11/27 MAP goal 75-80. Heparin antibody result pending.      11/28 Pt with abdominal pain, decreased bowel movements, emesis. Lactic acid 2.9 and repeat 2.7. Less concerned for mesenteric ischemia and more of a concern was for SBO. NG tube placed with subsequent suction of 500mL fluid. Abdomen less distended after placement and pt subsequently had bowel movement. MAP Goal of 60 with plan to come off pressors entirely and switch to dialysis after permacath, as he is not  liver transplant eligible at this time.      11/29 Pt with ileus. Clear liquids for now. Platelets 24. HIT versus zosyn induced? Peripheral smear sent. Zosyn changed to kaylah. Consent and transfuse 1U. GOC conversation today. Pt opting for long term dialysis.      11/30 naeon. Started back on heparin. One time dose of 120mg lasix today. Hold off SLED/SCUF today and give  IV lasix 120 mg once; plan for SLED/SCUF tomorrow      12/1 Patient is becoming more dependent on dialysis. Not a current liver transplant candidate. Still complaining of abdominal pain after discontinuing the NGT. AXR with evidence of dilated bowel loops. Brown bomb administered. The days following administration of the brown bomb, patient had more frequent bowel movements and was able to pass flatulence. Constipation eventually resolved.   Given the need for pressor support, he was worked up for adrenal insufficiency which was positive. Consequently, he was started on steroids for adrenal insufficiency. His blood pressures improved, however, he still required pressor support, particularly during dialysis. Pressor support was eventually weaned off.   Patient was started on midodrine to help with BP. Nephrology trialed intermittent HD and patient seemed to tolerate it well. Nephrology recommended placement of tunneled catheter for HD, interventional nephrology was consulted. Patient was medically stable for step down to the hospital medicine floors.         Interval History/Significant Events: NAEON. Episode of urinary retention requiring straight cath yesterday, no further retention noted since. Appropriate response to pRBC transfusion, no obvious bleeding. Pt working well with PT/OT today. Planning for TDC placement tomorrow.     Review of Systems  Objective:     Vital Signs (Most Recent):  Temp: 98.2 °F (36.8 °C) (12/15/24 1202)  Pulse: 72 (12/15/24 1202)  Resp: 14 (12/15/24 1202)  BP: (!) 99/51 (12/15/24 1202)  SpO2: (!) 94 % (12/15/24 1202) Vital Signs (24h Range):  Temp:  [97.8 °F (36.6 °C)-98.6 °F (37 °C)] 98.2 °F (36.8 °C)  Pulse:  [66-77] 72  Resp:  [14-18] 14  SpO2:  [93 %-99 %] 94 %  BP: ()/(49-57) 99/51   Weight: (!) 137.8 kg (303 lb 12.7 oz)  Body mass index is 40.08 kg/m².      Intake/Output Summary (Last 24 hours) at 12/15/2024 1225  Last data filed at 12/15/2024 1008  Gross per 24 hour   Intake  928.67 ml   Output 250 ml   Net 678.67 ml          Physical Exam  Vitals and nursing note reviewed.   Constitutional:       General: He is awake. He is not in acute distress.     Appearance: He is obese. He is ill-appearing. He is not toxic-appearing.   HENT:      Head: Normocephalic and atraumatic.   Eyes:      Extraocular Movements: Extraocular movements intact.      Conjunctiva/sclera: Conjunctivae normal.   Cardiovascular:      Rate and Rhythm: Normal rate.   Pulmonary:      Effort: Pulmonary effort is normal. No respiratory distress.   Abdominal:      General: There is distension.      Palpations: Abdomen is soft.      Tenderness: There is no abdominal tenderness. There is no guarding or rebound.   Musculoskeletal:         General: No swelling or tenderness.      Right lower leg: Edema present.      Left lower leg: Edema present.      Comments: 2+ pitting edema in bilateral lower extremities   Skin:     General: Skin is warm.      Coloration: Skin is not jaundiced.      Findings: Bruising present.   Neurological:      Mental Status: He is alert and oriented to person, place, and time.      Motor: No weakness.            Vents:     Lines/Drains/Airways       Central Venous Catheter Line  Duration             Trialysis (Dialysis) Catheter 11/24/24 0441 right internal jugular 21 days              Peripheral Intravenous Line  Duration                  Peripheral IV - Single Lumen 20 G Anterior;Distal;Left Forearm -- days                  Significant Labs:    CBC/Anemia Profile:  Recent Labs   Lab 12/14/24  0517 12/14/24  1616 12/15/24  0520   WBC 5.48 6.76 5.70   HGB 6.8* 7.9* 7.3*   HCT 21.2* 24.2* 22.8*   PLT 40* 47* 48*   MCV 82 83 82   RDW 26.1* 25.4* 25.5*        Chemistries:  Recent Labs   Lab 12/13/24  1406 12/14/24  0517 12/15/24  0520   *  130* 131*  131*  131* 129*   K 3.4*  3.4* 3.4*  3.4*  3.4* 3.7     105 104  104  104 103   CO2 16*  16* 19*  19*  20* 19*   BUN 12 12 13 13   13 18   CREATININE 2.9*  2.9* 3.5*  3.5*  3.5* 3.8*   CALCIUM 8.4*  8.4* 8.6*  8.6*  8.5* 8.5*   ALBUMIN 2.3*  2.3* 2.2*  2.2*  2.2* 2.2*   PROT  --  5.2* 5.3*   BILITOT  --  2.9* 3.9*   ALKPHOS  --  78 74   ALT  --  16 16   AST  --  38 36   MG 2.0  2.0 2.0  2.0  --    PHOS 2.8  2.8 3.9  3.9  3.8 4.3       All pertinent labs within the past 24 hours have been reviewed.    Significant Imaging:  I have reviewed all pertinent imaging results/findings within the past 24 hours.    Assessment and Plan     * Decompensated cirrhosis  Volume Overload  Etoh Cirrhosis  Hx of HCC s/p Y90    MELD 3.0: 34 at 12/15/2024  5:20 AM  MELD-Na: 33 at 12/15/2024  5:20 AM  Calculated from:  Serum Creatinine: 3.8 mg/dL (Using max of 3 mg/dL) at 12/15/2024  5:20 AM  Serum Sodium: 129 mmol/L at 12/15/2024  5:20 AM  Total Bilirubin: 3.9 mg/dL at 12/15/2024  5:20 AM  Serum Albumin: 2.2 g/dL at 12/15/2024  5:20 AM  INR(ratio): 1.8 at 12/15/2024  5:20 AM  Age at listing (hypothetical): 69 years  Sex: Male at 12/15/2024  5:20 AM    Cont lactulose  Daily CMP  Per hepatology, pt is not a transplant candidate at this time    Adrenal insufficiency  On going problem since admission, most likely multifactorial - component of liver dysfunction, HRS, sepsis on admission.  Continues to require levo, midodrine has been up titrated.   Random cortisol was 6   Persistent hypotension thought to be adrenal insufficiency.   Patient underwent testing with cosyntropin stimulation test, results are not compatible with adrenal insufficiency  Cont hydrocortisone taper      Moderate protein-calorie malnutrition  Nutrition consulted. Most recent weight and BMI monitored-     Measurements:  Wt Readings from Last 1 Encounters:   12/13/24 (!) 137.8 kg (303 lb 12.7 oz)   Body mass index is 40.08 kg/m².    Patient has been screened and assessed by RD.    Malnutrition Type:  Context: acute illness or injury  Level: moderate    Malnutrition Characteristic  Summary:  Energy Intake (Malnutrition): less than 75% for greater than 7 days  Subcutaneous Fat (Malnutrition): mild depletion  Muscle Mass (Malnutrition): mild depletion  Fluid Accumulation (Malnutrition): moderate to severe    Interventions/Recommendations (treatment strategy):  1.)      Hypotension        Edema  Volume mgmt with RRT      Debility  Patient with Acute on chronic debility due to chronic unspecified fatigue, age-related physical debility, and other reduced mobility. The patient's latest AMPAC (Activity Measure for Post Acute Care) Score is listed below.    AM-PAC Score - How much help does the patient need for each activity listed  Basic Mobility Total Score: 17  Turning over in bed (including adjusting bedclothes, sheets and blankets)?: A little  Sitting down on and standing up from a chair with arms (e.g., wheelchair, bedside commode, etc.): A little  Moving from lying on back to sitting on the side of the bed?: A little  Moving to and from a bed to a chair (including a wheelchair)?: A little  Need to walk in hospital room?: A little  Climbing 3-5 steps with a railing?: A lot    Plan  - Progressive mobility protocol initated  - PT/OT consulted  - Fall precautions in place  - PT/OT recommending LI therapy. Patient interested in SNF.       Advanced care planning/counseling discussion        Palliative care encounter  Goals of care, counseling/discussion  Advance Care Planning  Patient requests FULL CODE status and would like to continue current treatment      Abdominal pain        Gram-negative bacteremia  Blood cultures from admission with B. Diminuta, micrococcus luteus, lysinobacillus species. Seen by ID previously who recommended stopping antibiotics due to concern these were contaminants. Repeat blood cultures have been no growth. Has since been transferred to ICU with increased pressor requirement. Blood cultures repeated on 11/24 are no growth x 24 hours.   11/29 Switched from zosyn to  meropenem due to concern of thrombocytopenia.   Finished meropenem course 12/8 @ 6 PM    Encephalopathy, metabolic  2/2 decompensated cirrhosis and JAE  Resolved    Hyponatremia  Hyponatremia is likely due to Cirrhosis. The patient's most recent sodium results are listed below.  Recent Labs     12/13/24  1406 12/14/24  0517 12/15/24  0520   *  130* 131*  131*  131* 129*       Plan  - Correct the sodium by 4-6mEq in 24 hours.   - Appreciate nephrology recommendations  - Monitor sodium daily  - Patient hyponatremia is stable      Thrombocytopenia  The likely etiology of thrombocytopenia is liver disease. The patients 3 most recent labs are listed below.  Recent Labs     12/14/24  0517 12/14/24  1616 12/15/24  0520   PLT 40* 47* 48*       Plan  - Will transfuse if platelet count is <50k (if undergoing surgical procedure or have active bleeding).      Microcytic anemia  Anemia is likely due to chronic disease due to Chronic liver disease. Most recent hemoglobin and hematocrit are listed below.  Recent Labs     12/14/24  0517 12/14/24  1616 12/15/24  0520   HGB 6.8* 7.9* 7.3*   HCT 21.2* 24.2* 22.8*       Plan  - Monitor serial CBC: Daily  - Transfuse PRBC if patient becomes hemodynamically unstable, symptomatic or H/H drops below 7/21.  - Patient has received 1 units of PRBCs on 11/20, 11/21, 12/15  - Patient's anemia is currently stable  - Hb baseline 7-8. No obvious bleeding  - Eliquis held on admission.  DVT prophylaxis held.    Stage 3a chronic kidney disease  Nephrology following  Will try to remove more fluid  Strict intake and output  Renally dose all medications  Avoid nephrotoxic agents  Interventional nephrology consulted for tunneled cath for HD, plan for 12/16    Severe sepsis  This patient does have evidence of infective focus  My overall impression is sepsis.  Source: Abdominal     Organ dysfunction indicated by Acute kidney injury     Fluid challenge Contraindicated- Fluid bolus is  contraindicated in this patient due to End Stage Liver Disease      Post- resuscitation assessment No - Post resuscitation assessment not needed      Source control achieved by: antibiotics  Patient finished course of abx (meropenem) 12/8      JAE (acute kidney injury)  Baseline creatinine is  1.5 . Most recent creatinine and eGFR are listed below.    Recent Labs     12/13/24  1406 12/14/24  0517 12/15/24  0520   CREATININE 2.9*  2.9* 3.5*  3.5*  3.5* 3.8*   EGFRNORACEVR 22.4*  22.4* 17.9*  17.9*  17.9* 16.2*        Plan  - JAE is worsening. Will continue current treatment  - Avoid nephrotoxins and renally dose meds for GFR listed above  - Monitor urine output, serial BMP, and adjust therapy as needed    - Etiology includes volume overload, obstruction, hypotension, possible HRS  Baseline creatinine is 1.5. May require dialysis long term.   Patient tolerated intermittent HD well (12/12)  Avoid nephrotoxins and renally dose meds for GFR listed above  Monitor urine output, serial BMP, and adjust therapy as needed  Interventional nephrology consulted for placement of tunneled cath, planning for 12/16    Atrial fibrillation  Patient has persistent (7 days or more) atrial fibrillation. Patient is currently in atrial fibrillation. [id number] Score: 1. The patients heart rate in the last 24 hours is as follows:  Pulse  Min: 66  Max: 77       Plan  - Patient's afib is currently controlled  Holding home Eliquis in the setting of recent low blood counts  Continue metoprolol 12.5 mg BID when able    Coagulopathy  2/2 cirrhosis. See plan below    Recent Labs   Lab 12/15/24  0520   INR 1.8*     Received 3 doses of IV vitamin K.  End Stage Liver Disease precipitated coagulopathy  Heparin d/c d/t thrombocytopenia  CTM        Goals of care, counseling/discussion  Advance Care Planning    Code Status  In light of the patients advanced and life limiting illness,I engaged the the patient and family in a voluntary conversation  about the patient's preferences for care  at the very end of life. The patient wishes to have a natural, peaceful death.  Along those lines, the patient wishes to have CPR or other invasive treatments performed when his heart and/or breathing stops. I communicated to the patient and family. Continue FULL CODE.    A total of 15 min was spent on advance care planning, goals of care discussion, emotional support, formulating and communicating prognosis and exploring burden/benefit of various approaches of treatment. This discussion occurred on a fully voluntary basis with the verbal consent of the patient and/or family.           VTE Risk Mitigation (From admission, onward)           Ordered     Place sequential compression device  Until discontinued         11/22/24 1329     IP VTE HIGH RISK PATIENT  Once         11/20/24 1622                    Discharge Planning   AUTUMN: 12/20/2024     Code Status: Full Code   Medical Readiness for Discharge Date:   Discharge Plan A: Skilled Nursing Facility   Discharge Delays: None known at this time            Please place Justification for DME        Giovani Wheat MD  Department of Hospital Medicine   [facility] - Transplant Step[facility]

## 2024-12-15 NOTE — TREATMENT PLAN
Hepatology Treatment Plan    Juan Carlos Yoo Sr. is a 69 y.o. male admitted to hospital 11/20/2024 (Hospital Day: 26) due to Decompensated cirrhosis.     Interval History  No events overnight. Hgb 6.8 without overt bleeding.    Objective  Temp:  [97.8 °F (36.6 °C)-98.6 °F (37 °C)] 98.3 °F (36.8 °C) (12/15 1015)  Pulse:  [66-77] 66 (12/15 0659)  BP: ()/(49-57) 101/56 (12/15 0447)  Resp:  [14-18] 18 (12/15 0447)  SpO2:  [93 %-99 %] 98 % (12/15 1015)    Laboratory    Lab Results   Component Value Date    WBC 5.70 12/15/2024    HGB 7.3 (L) 12/15/2024    HCT 22.8 (L) 12/15/2024    MCV 82 12/15/2024    PLT 48 (L) 12/15/2024       Lab Results   Component Value Date     (L) 12/15/2024    K 3.7 12/15/2024     12/15/2024    CO2 19 (L) 12/15/2024    BUN 18 12/15/2024    CREATININE 3.8 (H) 12/15/2024    CALCIUM 8.5 (L) 12/15/2024       Lab Results   Component Value Date    ALBUMIN 2.2 (L) 12/15/2024    ALT 16 12/15/2024    AST 36 12/15/2024    GGT 30 12/13/2022    ALKPHOS 74 12/15/2024    BILITOT 3.9 (H) 12/15/2024       Lab Results   Component Value Date    INR 1.8 (H) 12/15/2024    INR 1.8 (H) 12/14/2024    INR 1.9 (H) 12/13/2024       MELD 3.0: 34 at 12/15/2024  5:20 AM  MELD-Na: 33 at 12/15/2024  5:20 AM  Calculated from:  Serum Creatinine: 3.8 mg/dL (Using max of 3 mg/dL) at 12/15/2024  5:20 AM  Serum Sodium: 129 mmol/L at 12/15/2024  5:20 AM  Total Bilirubin: 3.9 mg/dL at 12/15/2024  5:20 AM  Serum Albumin: 2.2 g/dL at 12/15/2024  5:20 AM  INR(ratio): 1.8 at 12/15/2024  5:20 AM  Age at listing (hypothetical): 69 years  Sex: Male at 12/15/2024  5:20 AM      Assessment  Juan Carlos Yoo Sr. is a 69 y.o. male with history of EtOH use disorder, EtOH cirrhosis, HCC s/p y90, morbid obesity BMI 44, HTN, and Afib who presents with severe anasarca and JAE. Known history of cirrhosis and is established patient of Dr. Daniels. Was previously evaluated for transplant and was declined due to persistently positive PETHs in  the past and also due to concern over BMI. Since May 2023 PETH has been negative, though patient reports he has not drank alcohol for the last 6 months at least. Presents now with decompensated cirrhosis and with significant renal injury with Cr 5.7 up from 1.5 a month prior. Unclear etiology of cirrhosis decompensation. He does report non-adherence with fluid restriction and volume overload could be contributing; unclear if patient has been taking medications appropriately given his poor insight. Also presents with marked renal dysfunction which is new from 1 month prior, and worsening renal function which is concerning for HRS. No prior known history of CHF and last TTE in 2022 with normal systolic function. Patient does report changes to his diuretic regimen at home recently that were made while he was at [facility] and is not sure if these changes were helping him keep fluid off. No LT[facility] records available, but it seems that patient had home lasix increased to 60mg BID and had metolazone 2.5 every other week added. Patient with fairly limited insight into his medical conditions and his medications, and has poor social support at home with only his son nearby who is a single father. PETH from 11/20 negative (last positive 2/2023).  Blood cultures from admission with B. Diminuta, micrococcus luteus, lysinobacillus species. ID consulted, on vancomycin and zosyn. Nephrology following for JAE; has been on SLED. Etiology of JAE likely HRS, has been in the MICU on pressor support. Pressors have weaned down but still requires them for SLED. PT/OT recommending moderate intensity therapy. On 11/28, abdomen was diffusely distended and he had bilious emesis. XRAY abdomen concerning for ileus vs obstruction. NGT placed with improvement in symptoms, removed on 11/30 and diet advanced to clear liquids. Started on hydrocortisone for suspected AI but endocrine does not suspect AI and has started tapering steroids. He was weaned off  pressors, tolerated HD and stepped down to Hospital Medicine.      Problem List:  Decompensated cirrhosis with ascites  Acute renal failure, concerning for HRS/ATN, now dialysis dependent   Severe obesity, BMI 43  Hx of EtOH Use disorder  Hx of HCC s/p Y90  Shock/Adrenal Insufficiency    Ileus- resolved      Recommendations:  - As of now, debility remains a prohibiting factor for transplant evaluation. It does appear he has improved with PT.   - Plan for tunneled HD line on 12/16.   - Please obtain daily CBC, CMP, PT/INR.    Thank you for involving us in the care of Juan Carlos Yoo Sr.. Please call with any additional concerns or questions.    Eugenia Zuluaga MD  Gastroenterology Fellow, PGY-V  [facility]

## 2024-12-15 NOTE — ASSESSMENT & PLAN NOTE
2/2 cirrhosis. See plan below    Recent Labs   Lab 12/15/24  0520   INR 1.8*     Received 3 doses of IV vitamin K.  End Stage Liver Disease precipitated coagulopathy  Heparin d/c d/t thrombocytopenia  CTM

## 2024-12-15 NOTE — ASSESSMENT & PLAN NOTE
Patient has persistent (7 days or more) atrial fibrillation. Patient is currently in atrial fibrillation. [id number] Score: 1. The patients heart rate in the last 24 hours is as follows:  Pulse  Min: 66  Max: 77       Plan  - Patient's afib is currently controlled  Holding home Eliquis in the setting of recent low blood counts  Continue metoprolol 12.5 mg BID when able

## 2024-12-16 LAB
ALBUMIN SERPL BCP-MCNC: 2.1 G/DL (ref 3.5–5.2)
ALP SERPL-CCNC: 75 U/L (ref 40–150)
ALT SERPL W/O P-5'-P-CCNC: 16 U/L (ref 10–44)
ANION GAP SERPL CALC-SCNC: 7 MMOL/L (ref 8–16)
AST SERPL-CCNC: 31 U/L (ref 10–40)
BASOPHILS # BLD AUTO: 0.03 K/UL (ref 0–0.2)
BASOPHILS NFR BLD: 0.6 % (ref 0–1.9)
BILIRUB SERPL-MCNC: 3 MG/DL (ref 0.1–1)
BUN SERPL-MCNC: 24 MG/DL (ref 8–23)
CALCIUM SERPL-MCNC: 8.3 MG/DL (ref 8.7–10.5)
CHLORIDE SERPL-SCNC: 102 MMOL/L (ref 95–110)
CO2 SERPL-SCNC: 18 MMOL/L (ref 23–29)
CREAT SERPL-MCNC: 4.3 MG/DL (ref 0.5–1.4)
DIFFERENTIAL METHOD BLD: ABNORMAL
EOSINOPHIL # BLD AUTO: 0.1 K/UL (ref 0–0.5)
EOSINOPHIL NFR BLD: 1.7 % (ref 0–8)
ERYTHROCYTE [DISTWIDTH] IN BLOOD BY AUTOMATED COUNT: 25.7 % (ref 11.5–14.5)
EST. GFR  (NO RACE VARIABLE): 14 ML/MIN/1.73 M^2
GLUCOSE SERPL-MCNC: 105 MG/DL (ref 70–110)
HCT VFR BLD AUTO: 22.1 % (ref 40–54)
HGB BLD-MCNC: 7.4 G/DL (ref 14–18)
IMM GRANULOCYTES # BLD AUTO: 0.04 K/UL (ref 0–0.04)
IMM GRANULOCYTES NFR BLD AUTO: 0.7 % (ref 0–0.5)
INR PPP: 1.7 (ref 0.8–1.2)
LYMPHOCYTES # BLD AUTO: 0.6 K/UL (ref 1–4.8)
LYMPHOCYTES NFR BLD: 11.1 % (ref 18–48)
MAGNESIUM SERPL-MCNC: 2.2 MG/DL (ref 1.6–2.6)
MCH RBC QN AUTO: 27.3 PG (ref 27–31)
MCHC RBC AUTO-ENTMCNC: 33.5 G/DL (ref 32–36)
MCV RBC AUTO: 82 FL (ref 82–98)
MONOCYTES # BLD AUTO: 1 K/UL (ref 0.3–1)
MONOCYTES NFR BLD: 18.6 % (ref 4–15)
NEUTROPHILS # BLD AUTO: 3.7 K/UL (ref 1.8–7.7)
NEUTROPHILS NFR BLD: 67.3 % (ref 38–73)
NRBC BLD-RTO: 0 /100 WBC
PHOSPHATE SERPL-MCNC: 4.4 MG/DL (ref 2.7–4.5)
PLATELET # BLD AUTO: 57 K/UL (ref 150–450)
PMV BLD AUTO: 10.3 FL (ref 9.2–12.9)
POTASSIUM SERPL-SCNC: 3.4 MMOL/L (ref 3.5–5.1)
PROT SERPL-MCNC: 5.4 G/DL (ref 6–8.4)
PROTHROMBIN TIME: 18.2 SEC (ref 9–12.5)
RBC # BLD AUTO: 2.71 M/UL (ref 4.6–6.2)
SODIUM SERPL-SCNC: 127 MMOL/L (ref 136–145)
WBC # BLD AUTO: 5.42 K/UL (ref 3.9–12.7)

## 2024-12-16 PROCEDURE — 02HV33Z INSERTION OF INFUSION DEVICE INTO SUPERIOR VENA CAVA, PERCUTANEOUS APPROACH: ICD-10-PCS | Performed by: RADIOLOGY

## 2024-12-16 PROCEDURE — 83735 ASSAY OF MAGNESIUM: CPT | Performed by: INTERNAL MEDICINE

## 2024-12-16 PROCEDURE — 63600175 PHARM REV CODE 636 W HCPCS: Performed by: RADIOLOGY

## 2024-12-16 PROCEDURE — 25000003 PHARM REV CODE 250

## 2024-12-16 PROCEDURE — 5A1D70Z PERFORMANCE OF URINARY FILTRATION, INTERMITTENT, LESS THAN 6 HOURS PER DAY: ICD-10-PCS | Performed by: STUDENT IN AN ORGANIZED HEALTH CARE EDUCATION/TRAINING PROGRAM

## 2024-12-16 PROCEDURE — 20600001 HC STEP DOWN PRIVATE ROOM

## 2024-12-16 PROCEDURE — 25000003 PHARM REV CODE 250: Performed by: INTERNAL MEDICINE

## 2024-12-16 PROCEDURE — 25000003 PHARM REV CODE 250: Performed by: STUDENT IN AN ORGANIZED HEALTH CARE EDUCATION/TRAINING PROGRAM

## 2024-12-16 PROCEDURE — 85025 COMPLETE CBC W/AUTO DIFF WBC: CPT

## 2024-12-16 PROCEDURE — 85610 PROTHROMBIN TIME: CPT | Performed by: INTERNAL MEDICINE

## 2024-12-16 PROCEDURE — 99223 1ST HOSP IP/OBS HIGH 75: CPT | Mod: 25,,, | Performed by: PHYSICIAN ASSISTANT

## 2024-12-16 PROCEDURE — 99232 SBSQ HOSP IP/OBS MODERATE 35: CPT | Mod: ,,, | Performed by: INTERNAL MEDICINE

## 2024-12-16 PROCEDURE — 80053 COMPREHEN METABOLIC PANEL: CPT

## 2024-12-16 PROCEDURE — 84100 ASSAY OF PHOSPHORUS: CPT

## 2024-12-16 PROCEDURE — 0JH63XZ INSERTION OF TUNNELED VASCULAR ACCESS DEVICE INTO CHEST SUBCUTANEOUS TISSUE AND FASCIA, PERCUTANEOUS APPROACH: ICD-10-PCS | Performed by: RADIOLOGY

## 2024-12-16 RX ORDER — LIDOCAINE HYDROCHLORIDE 10 MG/ML
INJECTION, SOLUTION INFILTRATION; PERINEURAL
Status: COMPLETED | OUTPATIENT
Start: 2024-12-16 | End: 2024-12-16

## 2024-12-16 RX ORDER — MIDAZOLAM HYDROCHLORIDE 1 MG/ML
INJECTION, SOLUTION INTRAMUSCULAR; INTRAVENOUS
Status: COMPLETED | OUTPATIENT
Start: 2024-12-16 | End: 2024-12-16

## 2024-12-16 RX ORDER — ACETAMINOPHEN 325 MG/1
650 TABLET ORAL EVERY 6 HOURS PRN
Status: COMPLETED | OUTPATIENT
Start: 2024-12-16 | End: 2024-12-16

## 2024-12-16 RX ORDER — MIDODRINE HYDROCHLORIDE 5 MG/1
15 TABLET ORAL
Status: COMPLETED | OUTPATIENT
Start: 2024-12-16 | End: 2024-12-30

## 2024-12-16 RX ORDER — POTASSIUM CHLORIDE 20 MEQ/1
20 TABLET, EXTENDED RELEASE ORAL ONCE
Status: COMPLETED | OUTPATIENT
Start: 2024-12-16 | End: 2024-12-16

## 2024-12-16 RX ORDER — FENTANYL CITRATE 50 UG/ML
INJECTION, SOLUTION INTRAMUSCULAR; INTRAVENOUS
Status: COMPLETED | OUTPATIENT
Start: 2024-12-16 | End: 2024-12-16

## 2024-12-16 RX ORDER — SODIUM CHLORIDE 9 MG/ML
INJECTION, SOLUTION INTRAVENOUS ONCE
Status: CANCELLED | OUTPATIENT
Start: 2024-12-16 | End: 2024-12-16

## 2024-12-16 RX ORDER — HEPARIN SODIUM 1000 [USP'U]/ML
INJECTION, SOLUTION INTRAVENOUS; SUBCUTANEOUS
Status: COMPLETED | OUTPATIENT
Start: 2024-12-16 | End: 2024-12-16

## 2024-12-16 RX ADMIN — METOPROLOL TARTRATE 12.5 MG: 25 TABLET, FILM COATED ORAL at 09:12

## 2024-12-16 RX ADMIN — MICONAZOLE NITRATE: 20 OINTMENT TOPICAL at 08:12

## 2024-12-16 RX ADMIN — POTASSIUM CHLORIDE 20 MEQ: 1500 TABLET, EXTENDED RELEASE ORAL at 09:12

## 2024-12-16 RX ADMIN — FAMOTIDINE 20 MG: 20 TABLET ORAL at 09:12

## 2024-12-16 RX ADMIN — HEPARIN SODIUM 3600 UNITS: 1000 INJECTION, SOLUTION INTRAVENOUS; SUBCUTANEOUS at 11:12

## 2024-12-16 RX ADMIN — ACETAMINOPHEN 650 MG: 325 TABLET ORAL at 08:12

## 2024-12-16 RX ADMIN — TAMSULOSIN HYDROCHLORIDE 0.8 MG: 0.4 CAPSULE ORAL at 08:12

## 2024-12-16 RX ADMIN — MIDODRINE HYDROCHLORIDE 20 MG: 5 TABLET ORAL at 02:12

## 2024-12-16 RX ADMIN — LACTULOSE 30 G: 20 SOLUTION ORAL at 02:12

## 2024-12-16 RX ADMIN — LIDOCAINE HYDROCHLORIDE 5 ML: 10 INJECTION, SOLUTION INFILTRATION; PERINEURAL at 11:12

## 2024-12-16 RX ADMIN — MIDAZOLAM HYDROCHLORIDE 1 MG: 2 INJECTION, SOLUTION INTRAMUSCULAR; INTRAVENOUS at 11:12

## 2024-12-16 RX ADMIN — MIDODRINE HYDROCHLORIDE 20 MG: 5 TABLET ORAL at 08:12

## 2024-12-16 RX ADMIN — MIDODRINE HYDROCHLORIDE 20 MG: 5 TABLET ORAL at 06:12

## 2024-12-16 RX ADMIN — HYDROCORTISONE 10 MG: 5 TABLET ORAL at 02:12

## 2024-12-16 RX ADMIN — FENTANYL CITRATE 50 MCG: 50 INJECTION, SOLUTION INTRAMUSCULAR; INTRAVENOUS at 11:12

## 2024-12-16 RX ADMIN — HYDROCORTISONE 10 MG: 5 TABLET ORAL at 06:12

## 2024-12-16 RX ADMIN — LACTULOSE 30 G: 20 SOLUTION ORAL at 08:12

## 2024-12-16 NOTE — PLAN OF CARE
Pt arrived to IR rm 190 for HD line placement. Pt oriented to unit and staff, Pt safely transferred from stretcher to procedural table. Fall risk reviewed and comfort measures utilized with interventions. Safety strap applied, position pillows to minimize pressure points. Blankets applied. Pt prepped and draped utilizing standard sterile technique. Patient placed on continuous monitoring, as required by sedation policy. Timeouts implemented utilizing standard universal time-out per department and facility policy. RN to remain at bedside with continuous monitoring. Pt resting comfortably. Denies pain/discomfort. Will continue to monitor. See flow sheets for monitoring, medication administration, and updates. patient verbalizes understanding.

## 2024-12-16 NOTE — ASSESSMENT & PLAN NOTE
Nephrology following  Will try to remove more fluid  Strict intake and output  Renally dose all medications  Avoid nephrotoxic agents  IR planning for tunneled HD cath today

## 2024-12-16 NOTE — PLAN OF CARE
Procedure complete. Pt tolerated well. Recovery for 1 hr. Site CDI. Pt transferred to MPU and report to be given bedside. Floor RN given report.

## 2024-12-16 NOTE — ASSESSMENT & PLAN NOTE
The likely etiology of thrombocytopenia is liver disease. The patients 3 most recent labs are listed below.  Recent Labs     12/14/24  1616 12/15/24  0520 12/16/24  0604   PLT 47* 48* 57*       Plan  - Will transfuse if platelet count is <50k (if undergoing surgical procedure or have active bleeding).

## 2024-12-16 NOTE — CARE UPDATE
1 hour post-procedure recovery completed for HD line placement. Patient AAOx3, no distress noted, respirations even and unlabored, will continue to monitor. VSS - see flowsheet. Right IJ site clean, dry, and intact; no bleeding or hematoma noted. Patient to be transferred to room 07303 via patient transporter. Patient stable for transport. Phone report given to JAZMYNE Geiger.Awaiting transport at this time.

## 2024-12-16 NOTE — NURSING
Bladder scan done and showed 248ml. Dr Olson notified and ordered not to perform an in and out until it is greater than 300.

## 2024-12-16 NOTE — PROGRESS NOTES
[facility] Medicine  Progress Note    Patient Name: Juan Carlos Yoo Sr.  MRN: [id number]  Patient Class: IP- Inpatient   Admission Date: 11/20/2024  Length of Stay: 26 days  Attending Physician: Giovani Wheat MD  Primary Care Provider: Nyla Rios FNP        Subjective     Principal Problem:Decompensated cirrhosis        HPI:  69M with PMH of alcoholic cirrhosis, esophageal varices, Afib on eliquis, and HTN who presents for c/o worsening diffuse swelling as well as R arm pain/erythema. He was recently hospitalized 1 month ago at [facility] for volume overload in the setting of decompensated cirrhosis. He was treated with IV diuresis and discharged with adjustment to his diuretics, currently on lasix 60mg BID and metolazone 2.5mg every other week as per family. Patient reports diffuse swelling of his upper and lower extremities in addition to distended abdomen and worsening dyspnea, which he believes is due to recent medication adjustment. Family states he is non-compliant with low sodium diet and fluid restriction. Family states he has been compliant with diuretics, but rom't taken eliquis for 3 days due to issue getting refill. He also reports scratching right arm on door frame 3-4 days ago which has become red and painful after wrapping in in bandage. He claims to be compliant with medications, but is a poor historian. He follows with hepatology, not currently active on transplant list. He states he hasn't consumed alcohol for at least 9 months. Has c/o chills, but denies fever, cough, nausea, vomiting, chest pain, dysuria, hematuria, blood in stool. Workup in ED remarkable for VS: T 97.6 HR 94 RR 22 BP 95/56 O2 sat 97% on RA. Hb 6.7, MCV 75, Plt 81, PT/INR 22.6/2.2, Na 128, K 6.1, BUN/Cr 49/5.7, Alb 2.8, AST/ALT 35/9, Ammonia 104, , Trop neg, LA 2.9, CXR: Cardiac size is enlarged similar to prior.  No large volume of pleural fluid noted although there is mild blunting of  the right costophrenic angle which may relate to a small amount of pleural fluid.  Suspected right basilar opacity, to be correlated clinically for infection. RUE venous doppler: No thrombus in central veins of the right upper extremity. Patient received vanc/zosyn and lasix 80mg IVP in ED and will be admitted to hospital medicine service for further management.    Overview/Hospital Course:  69 y.o. male with history of EtOH use disorder, EtOH cirrhosis, HCC s/p y90, morbid obesity BMI 44, HTN, and Afib who presents with severe anasarca and JAE.      Appreciate hepatology and nephrology recommendations.  Diuretics were held because of concern for HRS.  Patient was started on albumin and midodrine per Nephrology recommendations for HRS.  Renal function continued to worsen and recommendation per Nephrology to transfer to ICU for maintaining goal map over 85 on Levophed.  ICU was notified and patient to be assessed to be transferred to ICU.     Patient also has Gram-positive cocci and Gram-negative rods in his blood now.  Possible sources could be got translocation and barrier related infection through skin as he has been significantly edematous and has been oozing.  Has been on vancomycin and Rocephin.  Id was consulted this morning.  Repeat blood cultures were obtained.     Continues to have anemia.  Hemoglobin dropped on 11/20 and was given 1 unit of packed red blood cells.  Did not respond appropriately.  Hemoglobin dropped again to 6.8 on 11/21 and was given 1 unit of packed red blood cells.  Hemoglobin again on 11/22 is 7.2.  No reported melena or hematochezia.  Patient was on Eliquis for atrial fibrillation prior to admission which was held.  Given history of esophageal varices and portal hypertensive gastropathy whereas also could be component of slow bleeding, under production due to CKD and hemolysis while also with decompensated cirrhosis.  Started on Protonix IV b.i.d. and octreotide.     Also clarified  with hepatology.  Given patient's current infection we will await ID recommendations however we will be challenging to consider transplant evaluation at this time.  Trend clinical course     Goal clarification with the patient and family.  Patient at this time wants to be full code and continue with Levophed in ICU.  They would consider discussion with palliative care in a day or so if things do not get better.      In MICU patient started on Levophed, however titration remained difficult given tachycardic response from the patient.      11/24 Trialysis and arterial lines placed overnight and currently on levo and norepi. SLED today.     11/25 Boo dc. Increased midodrine. Continue steroids until d/c pressors. Discussed with Nephrology about our concern for sustained use of pressors to achieve their MAP goals of 85. Will follow up tomorrow.      11/26 Platelets 48. Repeat 38 confirming thrombocytopenia. Concern for HIT. D/c heparin. Increased lactulose dosing. Bladder scan revealed urine in bladder. Boo placed. Off vaso. Continuing levo. Maintain MAP goals 80. PT/OT consulted.      11/27 MAP goal 75-80. Heparin antibody result pending.      11/28 Pt with abdominal pain, decreased bowel movements, emesis. Lactic acid 2.9 and repeat 2.7. Less concerned for mesenteric ischemia and more of a concern was for SBO. NG tube placed with subsequent suction of 500mL fluid. Abdomen less distended after placement and pt subsequently had bowel movement. MAP Goal of 60 with plan to come off pressors entirely and switch to dialysis after permacath, as he is not  liver transplant eligible at this time.      11/29 Pt with ileus. Clear liquids for now. Platelets 24. HIT versus zosyn induced? Peripheral smear sent. Zosyn changed to kaylah. Consent and transfuse 1U. GOC conversation today. Pt opting for long term dialysis.      11/30 naeon. Started back on heparin. One time dose of 120mg lasix today. Hold off SLED/SCUF today and give  IV lasix 120 mg once; plan for SLED/SCUF tomorrow      12/1 Patient is becoming more dependent on dialysis. Not a current liver transplant candidate. Still complaining of abdominal pain after discontinuing the NGT. AXR with evidence of dilated bowel loops. Brown bomb administered. The days following administration of the brown bomb, patient had more frequent bowel movements and was able to pass flatulence. Constipation eventually resolved.   Given the need for pressor support, he was worked up for adrenal insufficiency which was positive. Consequently, he was started on steroids for adrenal insufficiency. His blood pressures improved, however, he still required pressor support, particularly during dialysis. Pressor support was eventually weaned off.   Patient was started on midodrine to help with BP. Nephrology trialed intermittent HD and patient seemed to tolerate it well. Nephrology recommended placement of tunneled catheter for HD, interventional nephrology was consulted. Patient was medically stable for step down to the hospital medicine floors.         Interval History/Significant Events: NAEON. No acute issues. Cont to require RRT. Planning for TDC today.     Review of Systems  Objective:     Vital Signs (Most Recent):  Temp: 97.2 °F (36.2 °C) (12/16/24 1220)  Pulse: 69 (12/16/24 1250)  Resp: 17 (12/16/24 1250)  BP: (!) 111/56 (12/16/24 1250)  SpO2: 99 % (12/16/24 1250) Vital Signs (24h Range):  Temp:  [97.2 °F (36.2 °C)-98.5 °F (36.9 °C)] 97.2 °F (36.2 °C)  Pulse:  [64-81] 69  Resp:  [11-22] 17  SpO2:  [95 %-100 %] 99 %  BP: ()/(48-60) 111/56   Weight: (!) 137.8 kg (303 lb 12.7 oz)  Body mass index is 40.08 kg/m².      Intake/Output Summary (Last 24 hours) at 12/16/2024 1254  Last data filed at 12/16/2024 0627  Gross per 24 hour   Intake 120 ml   Output --   Net 120 ml          Physical Exam  Vitals and nursing note reviewed.   Constitutional:       General: He is awake. He is not in acute distress.      Appearance: He is obese. He is ill-appearing. He is not toxic-appearing.   HENT:      Head: Normocephalic and atraumatic.   Eyes:      Extraocular Movements: Extraocular movements intact.      Conjunctiva/sclera: Conjunctivae normal.   Cardiovascular:      Rate and Rhythm: Normal rate.   Pulmonary:      Effort: Pulmonary effort is normal. No respiratory distress.   Abdominal:      General: There is distension.      Palpations: Abdomen is soft.      Tenderness: There is no abdominal tenderness. There is no guarding or rebound.   Musculoskeletal:         General: No swelling or tenderness.      Right lower leg: Edema present.      Left lower leg: Edema present.      Comments: 2+ pitting edema in bilateral lower extremities   Skin:     General: Skin is warm.      Coloration: Skin is not jaundiced.      Findings: Bruising present.   Neurological:      Mental Status: He is alert and oriented to person, place, and time.      Motor: No weakness.            Vents:     Lines/Drains/Airways       Central Venous Catheter Line  Duration                  Hemodialysis Catheter 12/16/24 1152 right internal jugular <1 day              Peripheral Intravenous Line  Duration                  Peripheral IV - Single Lumen 20 G Anterior;Distal;Left Forearm -- days                  Significant Labs:    CBC/Anemia Profile:  Recent Labs   Lab 12/14/24  1616 12/15/24  0520 12/16/24  0604   WBC 6.76 5.70 5.42   HGB 7.9* 7.3* 7.4*   HCT 24.2* 22.8* 22.1*   PLT 47* 48* 57*   MCV 83 82 82   RDW 25.4* 25.5* 25.7*        Chemistries:  Recent Labs   Lab 12/15/24  0520 12/16/24  0604   * 127*   K 3.7 3.4*    102   CO2 19* 18*   BUN 18 24*   CREATININE 3.8* 4.3*   CALCIUM 8.5* 8.3*   ALBUMIN 2.2* 2.1*   PROT 5.3* 5.4*   BILITOT 3.9* 3.0*   ALKPHOS 74 75   ALT 16 16   AST 36 31   MG  --  2.2   PHOS 4.3 4.4       All pertinent labs within the past 24 hours have been reviewed.    Significant Imaging:  I have reviewed all pertinent imaging  results/findings within the past 24 hours.    Assessment and Plan     * Decompensated cirrhosis  Volume Overload  Etoh Cirrhosis  Hx of HCC s/p Y90    MELD 3.0: 33 at 12/16/2024  6:04 AM  MELD-Na: 33 at 12/16/2024  6:04 AM  Calculated from:  Serum Creatinine: 4.3 mg/dL (Using max of 3 mg/dL) at 12/16/2024  6:04 AM  Serum Sodium: 127 mmol/L at 12/16/2024  6:04 AM  Total Bilirubin: 3 mg/dL at 12/16/2024  6:04 AM  Serum Albumin: 2.1 g/dL at 12/16/2024  6:04 AM  INR(ratio): 1.7 at 12/16/2024  6:04 AM  Age at listing (hypothetical): 69 years  Sex: Male at 12/16/2024  6:04 AM    Cont lactulose  Daily CMP  Per hepatology, pt is not a transplant candidate at this time    Adrenal insufficiency  On going problem since admission, most likely multifactorial - component of liver dysfunction, HRS, sepsis on admission.  Continues to require levo, midodrine has been up titrated.   Random cortisol was 6   Persistent hypotension thought to be adrenal insufficiency.   Patient underwent testing with cosyntropin stimulation test, results are not compatible with adrenal insufficiency  Cont hydrocortisone taper      Moderate protein-calorie malnutrition  Nutrition consulted. Most recent weight and BMI monitored-     Measurements:  Wt Readings from Last 1 Encounters:   12/13/24 (!) 137.8 kg (303 lb 12.7 oz)   Body mass index is 40.08 kg/m².    Patient has been screened and assessed by RD.    Malnutrition Type:  Context: acute illness or injury  Level: moderate    Malnutrition Characteristic Summary:  Energy Intake (Malnutrition): less than 75% for greater than 7 days  Subcutaneous Fat (Malnutrition): mild depletion  Muscle Mass (Malnutrition): mild depletion  Fluid Accumulation (Malnutrition): moderate to severe    Interventions/Recommendations (treatment strategy):  1.)      Hypotension        Edema  Volume mgmt with RRT      Debility  Patient with Acute on chronic debility due to chronic unspecified fatigue, age-related physical  debility, and other reduced mobility. The patient's latest AMPAC (Activity Measure for Post Acute Care) Score is listed below.    AM-PAC Score - How much help does the patient need for each activity listed  Basic Mobility Total Score: 17  Turning over in bed (including adjusting bedclothes, sheets and blankets)?: A little  Sitting down on and standing up from a chair with arms (e.g., wheelchair, bedside commode, etc.): A little  Moving from lying on back to sitting on the side of the bed?: A little  Moving to and from a bed to a chair (including a wheelchair)?: A little  Need to walk in hospital room?: A little  Climbing 3-5 steps with a railing?: A lot    Plan  - Progressive mobility protocol initated  - PT/OT consulted  - Fall precautions in place  - PT/OT recommending LI therapy. Patient interested in SNF.       Advanced care planning/counseling discussion        Palliative care encounter  Goals of care, counseling/discussion  Advance Care Planning  Patient requests FULL CODE status and would like to continue current treatment      Abdominal pain        Gram-negative bacteremia  Blood cultures from admission with B. Diminuta, micrococcus luteus, lysinobacillus species. Seen by ID previously who recommended stopping antibiotics due to concern these were contaminants. Repeat blood cultures have been no growth. Has since been transferred to ICU with increased pressor requirement. Blood cultures repeated on 11/24 are no growth x 24 hours.   11/29 Switched from zosyn to meropenem due to concern of thrombocytopenia.   Finished meropenem course 12/8 @ 6 PM    Encephalopathy, metabolic  2/2 decompensated cirrhosis and JAE  Resolved    Hyponatremia  Hyponatremia is likely due to Cirrhosis. The patient's most recent sodium results are listed below.  Recent Labs     12/14/24  0517 12/15/24  0520 12/16/24  0604   *  131*  131* 129* 127*       Plan  - Correct the sodium by 4-6mEq in 24 hours.   - Appreciate nephrology  recommendations  - Monitor sodium daily  - Patient hyponatremia is stable  - Mgmt with HD    Thrombocytopenia  The likely etiology of thrombocytopenia is liver disease. The patients 3 most recent labs are listed below.  Recent Labs     12/14/24  1616 12/15/24  0520 12/16/24  0604   PLT 47* 48* 57*       Plan  - Will transfuse if platelet count is <50k (if undergoing surgical procedure or have active bleeding).      Microcytic anemia  Anemia is likely due to chronic disease due to Chronic liver disease. Most recent hemoglobin and hematocrit are listed below.  Recent Labs     12/14/24  1616 12/15/24  0520 12/16/24  0604   HGB 7.9* 7.3* 7.4*   HCT 24.2* 22.8* 22.1*       Plan  - Monitor serial CBC: Daily  - Transfuse PRBC if patient becomes hemodynamically unstable, symptomatic or H/H drops below 7/21.  - Patient has received 1 units of PRBCs on 11/20, 11/21, 12/15  - Patient's anemia is currently stable  - Hb baseline 7-8. No obvious bleeding  - Eliquis held on admission.  DVT prophylaxis held.    Stage 3a chronic kidney disease  Nephrology following  Will try to remove more fluid  Strict intake and output  Renally dose all medications  Avoid nephrotoxic agents  IR planning for tunneled HD cath today    Severe sepsis  This patient does have evidence of infective focus  My overall impression is sepsis.  Source: Abdominal     Organ dysfunction indicated by Acute kidney injury     Fluid challenge Contraindicated- Fluid bolus is contraindicated in this patient due to End Stage Liver Disease      Post- resuscitation assessment No - Post resuscitation assessment not needed      Source control achieved by: antibiotics  Patient finished course of abx (meropenem) 12/8      JAE (acute kidney injury)  Baseline creatinine is  1.5 . Most recent creatinine and eGFR are listed below.    Recent Labs     12/14/24  0517 12/15/24  0520 12/16/24  0604   CREATININE 3.5*  3.5*  3.5* 3.8* 4.3*   EGFRNORACEVR 17.9*  17.9*  17.9* 16.2*  14.0*        Plan  - JAE is worsening. Will continue current treatment  - Avoid nephrotoxins and renally dose meds for GFR listed above  - Monitor urine output, serial BMP, and adjust therapy as needed    - Etiology includes volume overload, obstruction, hypotension, possible HRS  Baseline creatinine is 1.5. May require dialysis long term.   Patient tolerated intermittent HD well (12/12)  Avoid nephrotoxins and renally dose meds for GFR listed above  Monitor urine output, serial BMP, and adjust therapy as needed  IR planning for tunneled HD cath today    Atrial fibrillation  Patient has persistent (7 days or more) atrial fibrillation. Patient is currently in atrial fibrillation. [id number] Score: 1. The patients heart rate in the last 24 hours is as follows:  Pulse  Min: 64  Max: 81       Plan  - Patient's afib is currently controlled  Holding home Eliquis in the setting of recent low blood counts  Continue metoprolol 12.5 mg BID     Coagulopathy  2/2 cirrhosis. See plan below    Recent Labs   Lab 12/16/24  0604   INR 1.7*     Received 3 doses of IV vitamin K.  End Stage Liver Disease precipitated coagulopathy  Heparin d/c d/t thrombocytopenia  CTM        Goals of care, counseling/discussion  Advance Care Planning    Code Status  In light of the patients advanced and life limiting illness,I engaged the the patient and family in a voluntary conversation about the patient's preferences for care  at the very end of life. The patient wishes to have a natural, peaceful death.  Along those lines, the patient wishes to have CPR or other invasive treatments performed when his heart and/or breathing stops. I communicated to the patient and family. Continue FULL CODE.    A total of 15 min was spent on advance care planning, goals of care discussion, emotional support, formulating and communicating prognosis and exploring burden/benefit of various approaches of treatment. This discussion occurred on a fully voluntary basis  with the verbal consent of the patient and/or family.           VTE Risk Mitigation (From admission, onward)           Ordered     Place sequential compression device  Until discontinued         11/22/24 1329     IP VTE HIGH RISK PATIENT  Once         11/20/24 1622                    Discharge Planning   AUTUMN: 12/20/2024     Code Status: Full Code   Medical Readiness for Discharge Date:   Discharge Plan A: Skilled Nursing Facility   Discharge Delays: None known at this time            Please place Justification for DME        Giovani Wheat MD  Department of Hospital Medicine   [facility]

## 2024-12-16 NOTE — SUBJECTIVE & OBJECTIVE
Interval History: NAEON, alert and oriented, grossly edematous on physical exam. Planing for TDC placement today.    Review of patient's allergies indicates:  No Known Allergies  Current Facility-Administered Medications   Medication Frequency    0.9%  NaCl infusion (for blood administration) Q24H PRN    albuterol-ipratropium 2.5 mg-0.5 mg/3 mL nebulizer solution 3 mL Q6H PRN    aluminum-magnesium hydroxide-simethicone 200-200-20 mg/5 mL suspension 30 mL QID PRN    dextrose 10% bolus 125 mL 125 mL PRN    dextrose 10% bolus 250 mL 250 mL PRN    famotidine tablet 20 mg Every other day    glucagon (human recombinant) injection 1 mg PRN    glucose chewable tablet 16 g PRN    glucose chewable tablet 24 g PRN    hydrocortisone tablet 10 mg Q24H    Followed by    [START ON 12/24/2024] hydrocortisone tablet 5 mg Q24H    hydrocortisone tablet 10 mg Q24H    Followed by    [START ON 12/18/2024] hydrocortisone tablet 5 mg Q24H    lactulose 20 gram/30 mL solution Soln 30 g TID    melatonin tablet 6 mg Nightly PRN    metoprolol tartrate (LOPRESSOR) split tablet 12.5 mg BID    miconazole nitrate 2% ointment BID    midodrine tablet 15 mg PRN    midodrine tablet 20 mg Q8H    midodrine tablet 20 mg Daily PRN    naloxone 0.4 mg/mL injection 0.02 mg PRN    ondansetron injection 4 mg Q8H PRN    simethicone chewable tablet 80 mg QID PRN    sodium chloride 0.9% flush 10 mL PRN    tamsulosin 24 hr capsule 0.8 mg QHS       Objective:     Vital Signs (Most Recent):  Temp: 97.5 °F (36.4 °C) (12/16/24 1549)  Pulse: 75 (12/16/24 1549)  Resp: 20 (12/16/24 1549)  BP: (!) 99/56 (12/16/24 1549)  SpO2: 99 % (12/16/24 1549) Vital Signs (24h Range):  Temp:  [97.2 °F (36.2 °C)-98.5 °F (36.9 °C)] 97.5 °F (36.4 °C)  Pulse:  [64-81] 75  Resp:  [11-22] 20  SpO2:  [97 %-100 %] 99 %  BP: ()/(48-60) 99/56     Weight: (!) 137.8 kg (303 lb 12.7 oz) (12/13/24 0400)  Body mass index is 40.08 kg/m².  Body surface area is 2.66 meters squared.    I/O last 3  completed shifts:  In: 477 [P.O.:477]  Out: -      Physical Exam  Constitutional:       Appearance: He is obese.   Pulmonary:      Effort: Pulmonary effort is normal. No respiratory distress.   Musculoskeletal:         General: Swelling present.      Right lower leg: Edema present.      Left lower leg: Edema present.   Skin:     General: Skin is warm.   Neurological:      General: No focal deficit present.      Mental Status: He is alert and oriented to person, place, and time.          Significant Labs:  BMP:   Recent Labs   Lab 12/16/24  0604      *   K 3.4*      CO2 18*   BUN 24*   CREATININE 4.3*   CALCIUM 8.3*   MG 2.2     CBC:   Recent Labs   Lab 12/16/24  0604   WBC 5.42   RBC 2.71*   HGB 7.4*   HCT 22.1*   PLT 57*   MCV 82   MCH 27.3   MCHC 33.5        Significant Imaging:  Labs: Reviewed

## 2024-12-16 NOTE — CARE UPDATE
Patient arrived to MPU 4 for 1hr recover after HD line placement. Connected to bedside monitor - cardiac monitoring and continuous pulse oximetry applied. Call bell within reach, side rails raised x 2, bed locked and in lowest position. VSS - see flowsheet. Patient in no acute distress. No pain noted. Procedure site clean, dry, and intact; no bleeding or hematoma noted. Will continue to monitor.

## 2024-12-16 NOTE — ASSESSMENT & PLAN NOTE
Hyponatremia is likely due to Cirrhosis. The patient's most recent sodium results are listed below.  Recent Labs     12/14/24  0517 12/15/24  0520 12/16/24  0604   *  131*  131* 129* 127*       Plan  - Correct the sodium by 4-6mEq in 24 hours.   - Appreciate nephrology recommendations  - Monitor sodium daily  - Patient hyponatremia is stable  - Mgmt with HD

## 2024-12-16 NOTE — PROGRESS NOTES
[facility] Step[facility]  Nephrology  Progress Note    Patient Name: Juan Carlos Yoo Sr.  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 Recent admission 10/4 at [facility] for decompensated hepatic cirrhosis where he was discharged with oral diuretic regimen including furosemide 60 mg BID and metolazone 2.5 mg daily, low salt diet with fluid restriction. It is unclear if patient is adherent to these medications or lifestyle modifications. W/u notable for anemia with hb 6.7 s/p 1 unit pRBC 11/20 and JAE on CKD w elevated BUN 49/creatinine 5.9, hyperkalemia (K 6.1>5.1 after shifting), bicarb 18, elevated lactate up to 3.5. Nephrology consulted for JAE with c/o HRS    Interval History: NAEON, alert and oriented, grossly edematous on physical exam. Planing for TDC placement today.    Review of patient's allergies indicates:  No Known Allergies  Current Facility-Administered Medications   Medication Frequency    0.9%  NaCl infusion (for blood administration) Q24H PRN    albuterol-ipratropium 2.5 mg-0.5 mg/3 mL nebulizer solution 3 mL Q6H PRN    aluminum-magnesium hydroxide-simethicone 200-200-20 mg/5 mL suspension 30 mL QID PRN    dextrose 10% bolus 125 mL 125 mL PRN    dextrose 10% bolus 250 mL 250 mL PRN    famotidine tablet 20 mg Every other day    glucagon (human recombinant) injection 1 mg PRN    glucose chewable tablet 16 g PRN    glucose chewable tablet 24 g PRN    hydrocortisone tablet 10 mg Q24H    Followed  by    [START ON 12/24/2024] hydrocortisone tablet 5 mg Q24H    hydrocortisone tablet 10 mg Q24H    Followed by    [START ON 12/18/2024] hydrocortisone tablet 5 mg Q24H    lactulose 20 gram/30 mL solution Soln 30 g TID    melatonin tablet 6 mg Nightly PRN    metoprolol tartrate (LOPRESSOR) split tablet 12.5 mg BID    miconazole nitrate 2% ointment BID    midodrine tablet 15 mg PRN    midodrine tablet 20 mg Q8H    midodrine tablet 20 mg Daily PRN    naloxone 0.4 mg/mL injection 0.02 mg PRN    ondansetron injection 4 mg Q8H PRN    simethicone chewable tablet 80 mg QID PRN    sodium chloride 0.9% flush 10 mL PRN    tamsulosin 24 hr capsule 0.8 mg QHS       Objective:     Vital Signs (Most Recent):  Temp: 97.5 °F (36.4 °C) (12/16/24 1549)  Pulse: 75 (12/16/24 1549)  Resp: 20 (12/16/24 1549)  BP: (!) 99/56 (12/16/24 1549)  SpO2: 99 % (12/16/24 1549) Vital Signs (24h Range):  Temp:  [97.2 °F (36.2 °C)-98.5 °F (36.9 °C)] 97.5 °F (36.4 °C)  Pulse:  [64-81] 75  Resp:  [11-22] 20  SpO2:  [97 %-100 %] 99 %  BP: ()/(48-60) 99/56     Weight: (!) 137.8 kg (303 lb 12.7 oz) (12/13/24 0400)  Body mass index is 40.08 kg/m².  Body surface area is 2.66 meters squared.    I/O last 3 completed shifts:  In: 477 [P.O.:477]  Out: -      Physical Exam  Constitutional:       Appearance: He is obese.   Pulmonary:      Effort: Pulmonary effort is normal. No respiratory distress.   Musculoskeletal:         General: Swelling present.      Right lower leg: Edema present.      Left lower leg: Edema present.   Skin:     General: Skin is warm.   Neurological:      General: No focal deficit present.      Mental Status: He is alert and oriented to person, place, and time.          Significant Labs:  BMP:   Recent Labs   Lab 12/16/24  0604      *   K 3.4*      CO2 18*   BUN 24*   CREATININE 4.3*   CALCIUM 8.3*   MG 2.2     CBC:   Recent Labs   Lab 12/16/24  0604   WBC 5.42   RBC 2.71*   HGB 7.4*   HCT 22.1*   PLT 57*   MCV 82    MCH 27.3   MCHC 33.5        Significant Imaging:  Labs: Reviewed  Assessment/Plan:     Renal/  JAE (acute kidney injury)  JAE is likely due to pre-renal azotemia due to intravascular volume depletion secondary to decompensated hepatic cirrhosis with volume overload and acute tubular necrosis caused by hemodynamic instability, renal hypoperfusion, severe sepsis with GNR bacteremia. Initial presentation concerning for hepatorenal syndrome, transferred to MICU for vasopressors without success in reaching MAP goal 85-90 for treatment of HRS. Currently, patient with oligouric JAE more contributed by ATN as above.     Recommendations:  - Planing for HD session today, orders placed and dialysis unit informed  - Will do dialysis with low temperature, high calcium bath, higher duration of 4 hours, ultrafiltration modelling, PRN midodrine, all measures to prevent intra dialytic hypotension and effectively removes fluid.            Thank you for your consult. I will follow-up with patient. Please contact us if you have any additional questions.    Betty Terrazas MD  Nephrology  [facility]    ATTENDING PHYSICIAN ATTESTATION  I have personally verified the history and examined the patient. I thoroughly reviewed the demographic, clinical, laboratorial and imaging information available in medical records. I agree with the assessment and recommendations provided by the subspecialty resident who was under my supervision.

## 2024-12-16 NOTE — ASSESSMENT & PLAN NOTE
Patient has persistent (7 days or more) atrial fibrillation. Patient is currently in atrial fibrillation. TCEYL1GRYm Score: 1. The patients heart rate in the last 24 hours is as follows:  Pulse  Min: 64  Max: 81       Plan  - Patient's afib is currently controlled  Holding home Eliquis in the setting of recent low blood counts  Continue metoprolol 12.5 mg BID

## 2024-12-16 NOTE — ASSESSMENT & PLAN NOTE
2/2 cirrhosis. See plan below    Recent Labs   Lab 12/16/24  0604   INR 1.7*     Received 3 doses of IV vitamin K.  End Stage Liver Disease precipitated coagulopathy  Heparin d/c d/t thrombocytopenia  CTM

## 2024-12-16 NOTE — PROCEDURES
Radiology Post-Procedure Note    Pre Op Diagnosis: ESRD  Post Op Diagnosis: Same    Procedure: Tunneled HD line placement    Procedure performed by: Marco Mccann MD    Written Informed Consent Obtained: Yes  Specimen Removed: NO  Estimated Blood Loss: Minimal    Findings:   R chest wall tunneled HD line placed via IJ access.  No complications.    Patient tolerated procedure well.    Marco Mccann MD  Interventional Radiologist  Department of Radiology

## 2024-12-16 NOTE — ASSESSMENT & PLAN NOTE
Baseline creatinine is  1.5 . Most recent creatinine and eGFR are listed below.    Recent Labs     12/14/24  0517 12/15/24  0520 12/16/24  0604   CREATININE 3.5*  3.5*  3.5* 3.8* 4.3*   EGFRNORACEVR 17.9*  17.9*  17.9* 16.2* 14.0*        Plan  - JAE is worsening. Will continue current treatment  - Avoid nephrotoxins and renally dose meds for GFR listed above  - Monitor urine output, serial BMP, and adjust therapy as needed    - Etiology includes volume overload, obstruction, hypotension, possible HRS  Baseline creatinine is 1.5. May require dialysis long term.   Patient tolerated intermittent HD well (12/12)  Avoid nephrotoxins and renally dose meds for GFR listed above  Monitor urine output, serial BMP, and adjust therapy as needed  IR planning for tunneled HD cath today

## 2024-12-16 NOTE — ASSESSMENT & PLAN NOTE
Volume Overload  Etoh Cirrhosis  Hx of HCC s/p Y90    MELD 3.0: 33 at 12/16/2024  6:04 AM  MELD-Na: 33 at 12/16/2024  6:04 AM  Calculated from:  Serum Creatinine: 4.3 mg/dL (Using max of 3 mg/dL) at 12/16/2024  6:04 AM  Serum Sodium: 127 mmol/L at 12/16/2024  6:04 AM  Total Bilirubin: 3 mg/dL at 12/16/2024  6:04 AM  Serum Albumin: 2.1 g/dL at 12/16/2024  6:04 AM  INR(ratio): 1.7 at 12/16/2024  6:04 AM  Age at listing (hypothetical): 69 years  Sex: Male at 12/16/2024  6:04 AM    Cont lactulose  Daily CMP  Per hepatology, pt is not a transplant candidate at this time

## 2024-12-16 NOTE — PLAN OF CARE
12/16/24 1531   Post-Acute Status   Post-Acute Authorization Placement   Discharge Plan   Discharge Plan A Skilled Nursing Facility   Discharge Plan B New Nursing Home placement - snf care facility     Met with patient to review discharge recommendation of SNF and is agreeable to plan     Patient/family provided list of facilities in-network with patient's payor plan. Providers that are owned, operated, or affiliated with [facility] are included on the list.      Notified that referral sent to below listed facilities from in-network list based on proximity to home/family support:   [facility]  2.   [facility]  3.   [facility]  4.   [facility]  5.   [facility]  6.   [facility]     Patient/family instructed to identify preference. CM spoke to patient which states he doesn't have a preference just want to be close to [place] if possible. Patient states he doesn't have support at home so will need to be placed somewhere.      If an additional preferred facility not listed above is identified, additional referral to be sent. If above facilities unable to accept, will send additional referrals to in-network providers.       Discharge Plan A and Plan B have been determined by review of patient's clinical status, future medical and therapeutic needs, and coverage/benefits for post-acute care in coordination with multidisciplinary team members.      Ham Carrillo RN, BSN  Case Management  [phone]

## 2024-12-16 NOTE — ASSESSMENT & PLAN NOTE
Anemia is likely due to chronic disease due to Chronic liver disease. Most recent hemoglobin and hematocrit are listed below.  Recent Labs     12/14/24  1616 12/15/24  0520 12/16/24  0604   HGB 7.9* 7.3* 7.4*   HCT 24.2* 22.8* 22.1*       Plan  - Monitor serial CBC: Daily  - Transfuse PRBC if patient becomes hemodynamically unstable, symptomatic or H/H drops below 7/21.  - Patient has received 1 units of PRBCs on 11/20, 11/21, 12/15  - Patient's anemia is currently stable  - Hb baseline 7-8. No obvious bleeding  - Eliquis held on admission.  DVT prophylaxis held.

## 2024-12-16 NOTE — ASSESSMENT & PLAN NOTE
JAE is likely due to pre-renal azotemia due to intravascular volume depletion secondary to decompensated hepatic cirrhosis with volume overload and acute tubular necrosis caused by hemodynamic instability, renal hypoperfusion, severe sepsis with GNR bacteremia. Initial presentation concerning for hepatorenal syndrome, transferred to MICU for vasopressors without success in reaching MAP goal 85-90 for treatment of HRS. Currently, patient with oligouric JAE more contributed by ATN as above.     Recommendations:  - Planing for HD session today, orders placed and dialysis unit informed  - Will do dialysis with low temperature, high calcium bath, higher duration of 4 hours, ultrafiltration modelling, PRN midodrine, all measures to prevent intra dialytic hypotension and effectively removes fluid.

## 2024-12-16 NOTE — SUBJECTIVE & OBJECTIVE
Interval History/Significant Events: NAEON. No acute issues. Cont to require RRT. Planning for TDC today.     Review of Systems  Objective:     Vital Signs (Most Recent):  Temp: 97.2 °F (36.2 °C) (12/16/24 1220)  Pulse: 69 (12/16/24 1250)  Resp: 17 (12/16/24 1250)  BP: (!) 111/56 (12/16/24 1250)  SpO2: 99 % (12/16/24 1250) Vital Signs (24h Range):  Temp:  [97.2 °F (36.2 °C)-98.5 °F (36.9 °C)] 97.2 °F (36.2 °C)  Pulse:  [64-81] 69  Resp:  [11-22] 17  SpO2:  [95 %-100 %] 99 %  BP: ()/(48-60) 111/56   Weight: (!) 137.8 kg (303 lb 12.7 oz)  Body mass index is 40.08 kg/m².      Intake/Output Summary (Last 24 hours) at 12/16/2024 1254  Last data filed at 12/16/2024 0627  Gross per 24 hour   Intake 120 ml   Output --   Net 120 ml          Physical Exam  Vitals and nursing note reviewed.   Constitutional:       General: He is awake. He is not in acute distress.     Appearance: He is obese. He is ill-appearing. He is not toxic-appearing.   HENT:      Head: Normocephalic and atraumatic.   Eyes:      Extraocular Movements: Extraocular movements intact.      Conjunctiva/sclera: Conjunctivae normal.   Cardiovascular:      Rate and Rhythm: Normal rate.   Pulmonary:      Effort: Pulmonary effort is normal. No respiratory distress.   Abdominal:      General: There is distension.      Palpations: Abdomen is soft.      Tenderness: There is no abdominal tenderness. There is no guarding or rebound.   Musculoskeletal:         General: No swelling or tenderness.      Right lower leg: Edema present.      Left lower leg: Edema present.      Comments: 2+ pitting edema in bilateral lower extremities   Skin:     General: Skin is warm.      Coloration: Skin is not jaundiced.      Findings: Bruising present.   Neurological:      Mental Status: He is alert and oriented to person, place, and time.      Motor: No weakness.            Vents:     Lines/Drains/Airways       Central Venous Catheter Line  Duration                  Hemodialysis  Catheter 12/16/24 1152 right internal jugular <1 day              Peripheral Intravenous Line  Duration                  Peripheral IV - Single Lumen 20 G Anterior;Distal;Left Forearm -- days                  Significant Labs:    CBC/Anemia Profile:  Recent Labs   Lab 12/14/24  1616 12/15/24  0520 12/16/24  0604   WBC 6.76 5.70 5.42   HGB 7.9* 7.3* 7.4*   HCT 24.2* 22.8* 22.1*   PLT 47* 48* 57*   MCV 83 82 82   RDW 25.4* 25.5* 25.7*        Chemistries:  Recent Labs   Lab 12/15/24  0520 12/16/24  0604   * 127*   K 3.7 3.4*    102   CO2 19* 18*   BUN 18 24*   CREATININE 3.8* 4.3*   CALCIUM 8.5* 8.3*   ALBUMIN 2.2* 2.1*   PROT 5.3* 5.4*   BILITOT 3.9* 3.0*   ALKPHOS 74 75   ALT 16 16   AST 36 31   MG  --  2.2   PHOS 4.3 4.4       All pertinent labs within the past 24 hours have been reviewed.    Significant Imaging:  I have reviewed all pertinent imaging results/findings within the past 24 hours.

## 2024-12-16 NOTE — PLAN OF CARE
Plan of care reviewed with the patient at the beginning of the shift. Pt admitted with ESLD secondary to etoh cirrhosis. Pt remains aaox4. Pt on scheduled lactulose. Two bm's overnight. Pt with multiple skin tears, mepilex on both forearms. Seen by wound care. Skin is jaundice and ecchymotic. Abdomen distended and taut. Perineum is excoriated. Triad applied. Pt with HRS, has been on HD, plan for line placement today. Pt is oliguric. Fall precautions maintained. Pt remained free from falls and injury this shift. Bed locked in lowest position, side rails up x2, call light within reach. Instructed pt to call for assistance as needed. Pt verbalized understanding. Vitals stable. Tele monitoring continued- rate controlled afib. Pt afebrile overnight. No acute issues overnight. Will continue to monitor.

## 2024-12-16 NOTE — CONSULTS
Interventional Radiology  Consult/History & Physical Note    Consult Requested By: Giovani Wheat MD  Reason for Consult: Tunneled HD catheter placement    SUBJECTIVE:     Chief Complaint: JAE in need of HD    History of Present Illness:  Juan Carlos Yoo Sr. is a 69 y.o. male with a PMHx of alcoholic cirrhosis, esophageal varices, Afib on eliquis, and HTN who was admitted on 11/20/24 for severe anasarca and JAE. Hospital course notable for worsening JAE prompting nephrology consult and initiation of HRS protocol, transfer to ICU for pressors and CRRT, GNR and GPC bacteremia 2/2 translocation through skin, ileus requiring NGT, transfer back to the floor on 12/13. Interventional Radiology has been consulted for TDC  for HD access. Pt has initiated HD and currently does have a temporary HD line in place since 11/24. His last HD session was on 12/12. His WBC is 5.42 and is stable, last set of blood cultures on 11/24 revealed NGTD. Pt is afebrile and hemodynamically stable. He denies a history of LOLIS requiring nightly CPAP or difficulty breathing when lying flat.He does not take any anticoagulants. He has been NPO since midnight.    Review of Systems   Constitutional:  Negative for chills, fever and malaise/fatigue.       Scheduled Meds:   famotidine  20 mg Oral Every other day    hydrocortisone  10 mg Oral Q24H    Followed by    [START ON 12/24/2024] hydrocortisone  5 mg Oral Q24H    hydrocortisone  10 mg Oral Q24H    Followed by    [START ON 12/18/2024] hydrocortisone  5 mg Oral Q24H    lactulose  30 g Oral TID    metoprolol tartrate  12.5 mg Oral BID    midodrine  20 mg Oral Q8H    potassium chloride  20 mEq Oral Once    tamsulosin  0.8 mg Oral QHS     Continuous Infusions:  PRN Meds:  Current Facility-Administered Medications:     0.9%  NaCl infusion (for blood administration), , Intravenous, Q24H PRN    albuterol-ipratropium, 3 mL, Nebulization, Q6H PRN    aluminum-magnesium hydroxide-simethicone, 30 mL, Oral, QID  PRN    dextrose 10%, 12.5 g, Intravenous, PRN    dextrose 10%, 25 g, Intravenous, PRN    glucagon (human recombinant), 1 mg, Intramuscular, PRN    glucose, 16 g, Oral, PRN    glucose, 24 g, Oral, PRN    melatonin, 6 mg, Oral, Nightly PRN    midodrine, 20 mg, Oral, Daily PRN    naloxone, 0.02 mg, Intravenous, PRN    ondansetron, 4 mg, Intravenous, Q8H PRN    simethicone, 1 tablet, Oral, QID PRN    sodium chloride 0.9%, 10 mL, Intravenous, PRN    Review of patient's allergies indicates:  No Known Allergies    Past Medical History:   Diagnosis Date    Atrial fibrillation     Bulging disc     Cirrhosis     Colon polyp 12/29/2017    Rpt 5 yrs    EV (esophageal varices)     Hypertension 3/30/2023    Skin cancer 03/2017    Thrombocytopenia      Past Surgical History:   Procedure Laterality Date    CATHETERIZATION OF BOTH LEFT AND RIGHT HEART N/A 2/8/2023    Procedure: CATHETERIZATION, HEART, BOTH LEFT AND RIGHT;  Surgeon: Flo Malone MD;  Location: [facility] CATH LAB;  Service: Cardiology;  Laterality: N/A;  low bleeding risk 1.0%    CHOLECYSTECTOMY      COLONOSCOPY      COLONOSCOPY N/A 12/29/2017    ta rpt 2022    CORONARY ANGIOGRAPHY N/A 2/8/2023    Procedure: ANGIOGRAM, CORONARY ARTERY;  Surgeon: Flo Malone MD;  Location: [facility] CATH LAB;  Service: Cardiology;  Laterality: N/A;    HERNIA REPAIR      SKIN BIOPSY  03/2017    TONSILLECTOMY      UPPER GASTROINTESTINAL ENDOSCOPY  2011    EV    UPPER GASTROINTESTINAL ENDOSCOPY  2016    VASECTOMY       Family History   Problem Relation Name Age of Onset    Hyperlipidemia Brother      Cancer Maternal Uncle          Leukemia    Heart disease Maternal Uncle      Ovarian cancer Sister      Colon cancer Neg Hx       Social History     Tobacco Use    Smoking status: Never    Smokeless tobacco: Never   Substance Use Topics    Alcohol use: Not Currently     Alcohol/week: 0.0 standard drinks of alcohol    Drug use: No       OBJECTIVE:     Vital Signs (Most Recent)  Temp: 97.5 °F  (36.4 °C) (12/16/24 0832)  Pulse: 81 (12/16/24 0832)  Resp: 18 (12/16/24 0832)  BP: (!) 111/55 (12/16/24 0832)  SpO2: 98 % (12/16/24 0832)    Physical Exam:  Physical Exam  Vitals and nursing note reviewed.   Constitutional:       General: He is not in acute distress.     Appearance: He is obese. He is ill-appearing.   HENT:      Head: Normocephalic and atraumatic.   Eyes:      Extraocular Movements: Extraocular movements intact.      Conjunctiva/sclera: Conjunctivae normal.      Pupils: Pupils are equal, round, and reactive to light.   Neck:      Comments:  R IJ trialysis  Cardiovascular:      Rate and Rhythm: Normal rate.   Pulmonary:      Effort: Pulmonary effort is normal. No respiratory distress.   Abdominal:      General: Abdomen is flat.   Skin:     General: Skin is warm and dry.      Coloration: Skin is not jaundiced.   Neurological:      General: No focal deficit present.      Mental Status: He is alert and oriented to person, place, and time.   Psychiatric:         Mood and Affect: Mood normal.         Behavior: Behavior normal.         Thought Content: Thought content normal.         Judgment: Judgment normal.         Laboratory  I have reviewed all pertinent lab results within the past 24 hours.  CBC:   Recent Labs   Lab 12/16/24  0604   WBC 5.42   RBC 2.71*   HGB 7.4*   HCT 22.1*   PLT 57*   MCV 82   MCH 27.3   MCHC 33.5     BMP:   Recent Labs   Lab 12/16/24  0604      *   K 3.4*      CO2 18*   BUN 24*   CREATININE 4.3*   CALCIUM 8.3*   MG 2.2     CMP:   Recent Labs   Lab 12/16/24  0604      CALCIUM 8.3*   ALBUMIN 2.1*   PROT 5.4*   *   K 3.4*   CO2 18*      BUN 24*   CREATININE 4.3*   ALKPHOS 75   ALT 16   AST 31   BILITOT 3.0*     LFTs:   Recent Labs   Lab 12/16/24  0604   ALT 16   AST 31   ALKPHOS 75   BILITOT 3.0*   PROT 5.4*   ALBUMIN 2.1*     Coagulation:   Recent Labs   Lab 12/14/24  0517 12/15/24  0520 12/16/24  0604   LABPROT 19.4*   < > 18.2*   INR 1.8*    < > 1.7*   APTT 39.7*  --   --     < > = values in this interval not displayed.     Microbiology Results (last 7 days)       Procedure Component Value Units Date/Time    E. coli 0157 antigen [[id number]] Collected: 12/06/24 0907    Order Status: Completed Specimen: Stool Updated: 12/11/24 1435     Shiga Toxin 1 E.coli Negative     Shiga Toxin 2 E.coli Negative    Stool culture [[id number]] Collected: 12/06/24 0907    Order Status: Completed Specimen: Stool Updated: 12/09/24 1338     Stool Culture No Salmonella,Shigella,Vibrio,Campylobacter,Yersinia isolated.            ASA/Mallampati  ASA: 3  Mallampati: 2    Imaging:  Recent imaging studies reviewed.     ASSESSMENT/PLAN:     Assessment:  69 y.o. male with a PMHx of alcoholic cirrhosis, esophageal varices, Afib on eliquis, and HTN  who has been referred to IR for TDC placement for HD access. The procedure was discussed in great detail with the patient including thorough explanations of the potential risks and benefits of TDC placement. Risks include bleeding at the puncture site, infection, catheter related thrombus, catheter dysfunction, central vein stenosis and need for additional procedures. The patient is a candidate for TDC placement under moderate sedation. Plan discussed with ordering physician and pt verbalized understanding of the plan and would like to proceed.    Plan:  Will proceed with TDC placement under moderate sedation on 12/16/24.   Please keep pt NPO  Anticoagulation history reviewed.   Coagulation labs reviewed. INR 1.7 and plt count 57 on 12/16/24.  Thank you for the consult. Please contact with questions via [employer] secure chat or spectra    Time spent during patient care today was 79 minutes. This includes time spent before the visit reviewing the chart, discussing case with staff physician and ordering provider, time spent during the face to face patient visit, and time spent after the visit on documentation. Time excludes procedure time.      Kasandra Rinaldi PA-C  Interventional Radiology  Spectra: [id number]

## 2024-12-16 NOTE — CONSULTS
[facility] - Transplant Stepdown  Wound Care    Patient Name:  Juan Carlos Yoo Sr.   MRN:  [id number]  Date: 12/16/2024  Diagnosis: Decompensated cirrhosis    History:     Past Medical History:   Diagnosis Date    Atrial fibrillation     Bulging disc     Cirrhosis     Colon polyp 12/29/2017    Rpt 5 yrs    EV (esophageal varices)     Hypertension 3/30/2023    Skin cancer 03/2017    Thrombocytopenia        Social History     Socioeconomic History    Marital status: Single   Occupational History     Employer: [employer]   Tobacco Use    Smoking status: Never    Smokeless tobacco: Never   Substance and Sexual Activity    Alcohol use: Not Currently     Alcohol/week: 0.0 standard drinks of alcohol    Drug use: No     Social Drivers of Health     Financial Resource Strain: Low Risk  (11/21/2024)    Overall Financial Resource Strain (CARDIA)     Difficulty of Paying Living Expenses: Not very hard   Food Insecurity: No Food Insecurity (11/21/2024)    Hunger Vital Sign     Worried About Running Out of Food in the Last Year: Never true     Ran Out of Food in the Last Year: Never true   Transportation Needs: No Transportation Needs (11/21/2024)    TRANSPORTATION NEEDS     Transportation : No   Physical Activity: Inactive (11/21/2024)    Exercise Vital Sign     Days of Exercise per Week: 0 days     Minutes of Exercise per Session: 0 min   Stress: Stress Concern Present (11/21/2024)    [employer] [place] [employer] - Occupational Stress Questionnaire     Feeling of Stress : To some extent   Housing Stability: Low Risk  (11/21/2024)    Housing Stability Vital Sign     Unable to Pay for Housing in the Last Year: No     Homeless in the Last Year: No       Precautions:     Allergies as of 11/20/2024    (No Known Allergies)       WOC Assessment Details/Treatment   Patient seen for wound care consultation.  Chart reviewed for this encounter.  See flow sheet for findings.     Pt in bed and agreeable to care. Pt positioned  on the left side with minimal assistance for assessment. The buttocks and posterior/medial thighs are intact, pink and moist with fungal apperance. Pt educated on the wound care and the importance of turning every 2 hours. Pt is on an immerse mattress.    Recommendations:  - Buttocks and posterior/inner thighs: bedside nursing to cleanse with bath wipes, pat dry and apply antifungal ointment bid/prn; no dressings  - Turning every 2 hours  - Heel lift boots     12/16/24 1006        Wound 12/13/24 1630 Moisture associated dermatitis Buttocks   Date First Assessed/Time First Assessed: 12/13/24 1630   Primary Wound Type: Moisture associated dermatitis  Location: Buttocks   Wound Image    Dressing Appearance Open to air   Drainage Amount None   Appearance Intact;Pink;Moist   Periwound Area Intact;Moist     Phil score: 16  Recommendations made to primary team for above plan via secure chat. Wound care will follow-up as needed.     12/16/2024

## 2024-12-16 NOTE — PLAN OF CARE
12/16/24 1532   Discharge Assessment   Assessment Type Discharge Planning Reassessment   Confirmed/corrected address, phone number and insurance Yes   Confirmed Demographics Correct on Facesheet   Source of Information patient   Communicated AUTUMN with patient/caregiver Yes   Reason For Admission Decompensated cirrhosis   People in Home alone   Do you expect to return to your current living situation? No   Do you have help at home or someone to help you manage your care at home? No   Prior to hospitilization cognitive status: Alert/Oriented;No Deficits   Current cognitive status: Alert/Oriented;No Deficits   Home Accessibility not wheelchair accessible   Home Layout Able to live on 1st floor   Readmission within 30 days? No   Patient currently being followed by outpatient case management? No   Do you currently have service(s) that help you manage your care at home? No   Do you take prescription medications? Yes   Do you have prescription coverage? Yes   Do you have any problems affording any of your prescribed medications? No   Is the patient taking medications as prescribed? yes   Discharge Plan A Skilled Nursing Facility   Discharge Plan B New Nursing Home placement - assisted care facility   Discharge Plan discussed with: Patient   Transition of Care Barriers [facility] - Transplant Stepdown  Discharge Assessment    Primary Care Provider: Nyla Rios FNP       Discharge Plan A and Plan B have been determined by review of patient's clinical status, future medical and therapeutic needs, and coverage/benefits for post-acute care in coordination with multidisciplinary team members.     Ham Carrillo RN, BSN  Case Management  [phone]

## 2024-12-17 LAB
ALBUMIN SERPL BCP-MCNC: 2.2 G/DL (ref 3.5–5.2)
ALP SERPL-CCNC: 86 U/L (ref 40–150)
ALT SERPL W/O P-5'-P-CCNC: 20 U/L (ref 10–44)
ANION GAP SERPL CALC-SCNC: 11 MMOL/L (ref 8–16)
AST SERPL-CCNC: 38 U/L (ref 10–40)
BASOPHILS # BLD AUTO: 0.04 K/UL (ref 0–0.2)
BASOPHILS NFR BLD: 0.5 % (ref 0–1.9)
BILIRUB SERPL-MCNC: 3.7 MG/DL (ref 0.1–1)
BUN SERPL-MCNC: 15 MG/DL (ref 8–23)
CALCIUM SERPL-MCNC: 8 MG/DL (ref 8.7–10.5)
CHLORIDE SERPL-SCNC: 102 MMOL/L (ref 95–110)
CO2 SERPL-SCNC: 19 MMOL/L (ref 23–29)
CREAT SERPL-MCNC: 2.6 MG/DL (ref 0.5–1.4)
DIFFERENTIAL METHOD BLD: ABNORMAL
EOSINOPHIL # BLD AUTO: 0.1 K/UL (ref 0–0.5)
EOSINOPHIL NFR BLD: 0.8 % (ref 0–8)
ERYTHROCYTE [DISTWIDTH] IN BLOOD BY AUTOMATED COUNT: 26.4 % (ref 11.5–14.5)
EST. GFR  (NO RACE VARIABLE): 25.6 ML/MIN/1.73 M^2
GLUCOSE SERPL-MCNC: 104 MG/DL (ref 70–110)
HCT VFR BLD AUTO: 26.1 % (ref 40–54)
HGB BLD-MCNC: 9 G/DL (ref 14–18)
IMM GRANULOCYTES # BLD AUTO: 0.05 K/UL (ref 0–0.04)
IMM GRANULOCYTES NFR BLD AUTO: 0.7 % (ref 0–0.5)
INR PPP: 1.6 (ref 0.8–1.2)
LYMPHOCYTES # BLD AUTO: 0.4 K/UL (ref 1–4.8)
LYMPHOCYTES NFR BLD: 5.4 % (ref 18–48)
MAGNESIUM SERPL-MCNC: 2 MG/DL (ref 1.6–2.6)
MCH RBC QN AUTO: 28.6 PG (ref 27–31)
MCHC RBC AUTO-ENTMCNC: 34.5 G/DL (ref 32–36)
MCV RBC AUTO: 83 FL (ref 82–98)
MONOCYTES # BLD AUTO: 1.3 K/UL (ref 0.3–1)
MONOCYTES NFR BLD: 17.7 % (ref 4–15)
NEUTROPHILS # BLD AUTO: 5.6 K/UL (ref 1.8–7.7)
NEUTROPHILS NFR BLD: 74.9 % (ref 38–73)
NRBC BLD-RTO: 0 /100 WBC
PHOSPHATE SERPL-MCNC: 2.9 MG/DL (ref 2.7–4.5)
PLATELET # BLD AUTO: 57 K/UL (ref 150–450)
PMV BLD AUTO: ABNORMAL FL (ref 9.2–12.9)
POTASSIUM SERPL-SCNC: 3.7 MMOL/L (ref 3.5–5.1)
PROT SERPL-MCNC: 5.7 G/DL (ref 6–8.4)
PROTHROMBIN TIME: 17.3 SEC (ref 9–12.5)
RBC # BLD AUTO: 3.15 M/UL (ref 4.6–6.2)
SODIUM SERPL-SCNC: 132 MMOL/L (ref 136–145)
WBC # BLD AUTO: 7.53 K/UL (ref 3.9–12.7)

## 2024-12-17 PROCEDURE — 36415 COLL VENOUS BLD VENIPUNCTURE: CPT

## 2024-12-17 PROCEDURE — 83735 ASSAY OF MAGNESIUM: CPT | Performed by: INTERNAL MEDICINE

## 2024-12-17 PROCEDURE — 80100014 HC HEMODIALYSIS 1:1

## 2024-12-17 PROCEDURE — 80053 COMPREHEN METABOLIC PANEL: CPT

## 2024-12-17 PROCEDURE — 97530 THERAPEUTIC ACTIVITIES: CPT | Mod: CQ

## 2024-12-17 PROCEDURE — 97530 THERAPEUTIC ACTIVITIES: CPT

## 2024-12-17 PROCEDURE — 25000003 PHARM REV CODE 250

## 2024-12-17 PROCEDURE — 25000003 PHARM REV CODE 250: Performed by: INTERNAL MEDICINE

## 2024-12-17 PROCEDURE — 97535 SELF CARE MNGMENT TRAINING: CPT

## 2024-12-17 PROCEDURE — 99232 SBSQ HOSP IP/OBS MODERATE 35: CPT | Mod: ,,, | Performed by: INTERNAL MEDICINE

## 2024-12-17 PROCEDURE — 85025 COMPLETE CBC W/AUTO DIFF WBC: CPT

## 2024-12-17 PROCEDURE — 20600001 HC STEP DOWN PRIVATE ROOM

## 2024-12-17 PROCEDURE — 85610 PROTHROMBIN TIME: CPT | Performed by: INTERNAL MEDICINE

## 2024-12-17 PROCEDURE — 84100 ASSAY OF PHOSPHORUS: CPT

## 2024-12-17 PROCEDURE — 97116 GAIT TRAINING THERAPY: CPT | Mod: CQ

## 2024-12-17 PROCEDURE — 94761 N-INVAS EAR/PLS OXIMETRY MLT: CPT

## 2024-12-17 RX ORDER — POLYETHYLENE GLYCOL 3350 17 G/17G
17 POWDER, FOR SOLUTION ORAL 2 TIMES DAILY PRN
Status: DISCONTINUED | OUTPATIENT
Start: 2024-12-17 | End: 2024-12-31 | Stop reason: HOSPADM

## 2024-12-17 RX ADMIN — MICONAZOLE NITRATE: 20 OINTMENT TOPICAL at 08:12

## 2024-12-17 RX ADMIN — HYDROCORTISONE 10 MG: 5 TABLET ORAL at 06:12

## 2024-12-17 RX ADMIN — MIDODRINE HYDROCHLORIDE 20 MG: 5 TABLET ORAL at 06:12

## 2024-12-17 RX ADMIN — MICONAZOLE NITRATE: 20 OINTMENT TOPICAL at 09:12

## 2024-12-17 RX ADMIN — MIDODRINE HYDROCHLORIDE 20 MG: 5 TABLET ORAL at 09:12

## 2024-12-17 RX ADMIN — LACTULOSE 30 G: 20 SOLUTION ORAL at 08:12

## 2024-12-17 RX ADMIN — MIDODRINE HYDROCHLORIDE 20 MG: 5 TABLET ORAL at 12:12

## 2024-12-17 RX ADMIN — MIDODRINE HYDROCHLORIDE 20 MG: 5 TABLET ORAL at 04:12

## 2024-12-17 RX ADMIN — LACTULOSE 30 G: 20 SOLUTION ORAL at 09:12

## 2024-12-17 RX ADMIN — TAMSULOSIN HYDROCHLORIDE 0.8 MG: 0.4 CAPSULE ORAL at 09:12

## 2024-12-17 RX ADMIN — HYDROCORTISONE 10 MG: 5 TABLET ORAL at 04:12

## 2024-12-17 RX ADMIN — LACTULOSE 30 G: 20 SOLUTION ORAL at 04:12

## 2024-12-17 NOTE — ASSESSMENT & PLAN NOTE
Patient with Acute on chronic debility due to chronic unspecified fatigue, age-related physical debility, and other reduced mobility. The patient's latest AMPAC (Activity Measure for Post Acute Care) Score is listed below.    AM-PAC Score - How much help does the patient need for each activity listed  Basic Mobility Total Score: 17  Turning over in bed (including adjusting bedclothes, sheets and blankets)?: A little  Sitting down on and standing up from a chair with arms (e.g., wheelchair, bedside commode, etc.): A little  Moving from lying on back to sitting on the side of the bed?: A little  Moving to and from a bed to a chair (including a wheelchair)?: A little  Need to walk in hospital room?: A little  Climbing 3-5 steps with a railing?: A lot    Plan  - Progressive mobility protocol initated  - PT/OT consulted  - Fall precautions in place  - PT/OT recommending LI therapy. Planning for SNF

## 2024-12-17 NOTE — PT/OT/SLP PROGRESS
Occupational Therapy   Treatment    Name: Juan Carlos Yoo Sr.  MRN: [id number]  Admitting Diagnosis:  Decompensated cirrhosis       Recommendations:     Discharge Recommendations: Moderate Intensity Therapy  Discharge Equipment Recommendations:  walker, rolling, wheelchair, bedside commode, grab bar, bath bench, hospital bed  Barriers to discharge:  Decreased caregiver support    Assessment:     Juan Carlos Yoo Sr. is a 69 y.o. male with a medical diagnosis of Decompensated cirrhosis. Pt progressing well brushing his teeth at the sink then walked 65' CGA with a RW. Performance deficits affecting function are weakness, impaired endurance, decreased coordination, impaired self care skills, impaired functional mobility, gait instability, impaired balance, decreased safety awareness, decreased lower extremity function.     Rehab Prognosis:  Good; patient would benefit from acute skilled OT services to address these deficits and reach maximum level of function.       Plan:     Patient to be seen 4 x/week to address the above listed problems via self-care/home management, therapeutic activities, therapeutic exercises, neuromuscular re-education  Plan of Care Expires: 12/27/24  Plan of Care Reviewed with: patient    Subjective     Pain/Comfort:  Pain Rating 1: 0/10    Objective:     Communicated with: rn prior to session.  Patient found supine with Trialysis, telemetry upon OT entry to room.    General Precautions: Standard, fall    Orthopedic Precautions:N/A  Braces: N/A  Respiratory Status: Room air     Occupational Performance:     Bed Mobility:    Patient completed Scooting/Bridging with stand by assistance  Patient completed Supine to Sit with stand by assistance  Patient completed Sit to Supine with minimum assistance and with leg lift     Functional Mobility/Transfers:  Patient completed Sit <> Stand Transfer with contact guard assistance  with  rolling walker   Functional Mobility: Pt walked from the bed>sink>65' CGA with a  RW.    Activities of Daily Living:  Grooming: stand by assistance standing at the sink.  Upper Body Dressing: minimum assistance    AMPAC 6 Click ADL: 17    Treatment & Education:  Discussed OT POC and progress.    Patient left supine with all lines intact and call button in reach    GOALS:   Multidisciplinary Problems       Occupational Therapy Goals          Problem: Occupational Therapy    Goal Priority Disciplines Outcome Interventions   Occupational Therapy Goal     OT, PT/OT Progressing    Description: Goals to be met by: 12/25/24     Patient will increase functional independence with ADLs by performing:    UE Dressing with Set-up Assistance.  LE Dressing with Stand-by Assistance.  Grooming while standing at sink with Stand-by Assistance. Met 12/17  Revised to (S).  Toileting from toilet with Stand-by Assistance for hygiene and clothing management.   Step transfer: with SBA and use of LRAD  Supine to sit with SBA. Met 12/17  Revised to (I).  Toilet transfer to toilet with SBA and use of LRAD.  Independent with HEP to BUE to improve ROM                         Time Tracking:     OT Date of Treatment: 12/17/24  OT Start Time: 1101  OT Stop Time: 1130  OT Total Time (min): 29 min    Billable Minutes:Self Care/Home Management 10  Therapeutic Activity 19    OT/EDWARDO: OT          12/17/2024

## 2024-12-17 NOTE — ASSESSMENT & PLAN NOTE
ESRD on HD  Nephrology following  Will try to remove more fluid  Strict intake and output  Renally dose all medications  Avoid nephrotoxic agents  IR placed RIJ TDC 12/16  CM assisting with finding HD chair

## 2024-12-17 NOTE — ASSESSMENT & PLAN NOTE
The likely etiology of thrombocytopenia is liver disease. The patients 3 most recent labs are listed below.  Recent Labs     12/15/24  0520 12/16/24  0604 12/17/24  0641   PLT 48* 57* 57*       Plan  - Will transfuse if platelet count is <50k (if undergoing surgical procedure or have active bleeding).

## 2024-12-17 NOTE — PROGRESS NOTES
Hepatology Progress Note    Juan Carlos Yoo Sr. is a 69 y.o. male admitted to hospital 11/20/2024 (Hospital Day: 28) due to Decompensated cirrhosis.     Interval History    Working with PT/OT. He struggles to get off the bed or off the toilet, but is walking some. He had a tunneled line placed on 12/16 and received HD overnight with no issues.     Objective  Temp:  [97.5 °F (36.4 °C)-98.6 °F (37 °C)] 97.6 °F (36.4 °C) (12/17 1154)  Pulse:  [70-85] 77 (12/17 1154)  BP: ()/(50-66) 96/54 (12/17 1154)  Resp:  [17-20] 18 (12/17 1154)  SpO2:  [95 %-99 %] 96 % (12/17 1154)    Laboratory    Lab Results   Component Value Date    WBC 7.53 12/17/2024    HGB 9.0 (L) 12/17/2024    HCT 26.1 (L) 12/17/2024    MCV 83 12/17/2024    PLT 57 (L) 12/17/2024       Lab Results   Component Value Date     (L) 12/17/2024    K 3.7 12/17/2024     12/17/2024    CO2 19 (L) 12/17/2024    BUN 15 12/17/2024    CREATININE 2.6 (H) 12/17/2024    CALCIUM 8.0 (L) 12/17/2024       Lab Results   Component Value Date    ALBUMIN 2.2 (L) 12/17/2024    ALT 20 12/17/2024    AST 38 12/17/2024    GGT 30 12/13/2022    ALKPHOS 86 12/17/2024    BILITOT 3.7 (H) 12/17/2024       Lab Results   Component Value Date    INR 1.6 (H) 12/17/2024    INR 1.7 (H) 12/16/2024    INR 1.8 (H) 12/15/2024       MELD 3.0: 31 at 12/17/2024  6:41 AM  MELD-Na: 32 at 12/17/2024  6:41 AM  Calculated from:  Serum Creatinine: On dialysis. Using the maximum value.  Serum Sodium: 132 mmol/L at 12/17/2024  6:41 AM  Total Bilirubin: 3.7 mg/dL at 12/17/2024  6:41 AM  Serum Albumin: 2.2 g/dL at 12/17/2024  6:41 AM  INR(ratio): 1.6 at 12/17/2024  6:41 AM  Age at listing (hypothetical): 69 years  Sex: Male at 12/17/2024  6:41 AM      Assessment  Juan Carlos CECILIA Fredo Easton is a 69 y.o. male with history of EtOH use disorder, EtOH cirrhosis, HCC s/p y90, morbid obesity BMI 44, HTN, and Afib who presents with severe anasarca and JAE. Known history of cirrhosis and is established patient of   [facility]. Was previously evaluated for transplant and was declined due to persistently positive PETHs in the past and also due to concern over BMI. Since May 2023 PETH has been negative, though patient reports he has not drank alcohol for the last 6 months at least. Presents now with decompensated cirrhosis and with significant renal injury with Cr 5.7 up from 1.5 a month prior. Unclear etiology of cirrhosis decompensation. He does report non-adherence with fluid restriction and volume overload could be contributing; unclear if patient has been taking medications appropriately given his poor insight. Also presents with marked renal dysfunction which is new from 1 month prior, and worsening renal function which is concerning for HRS. No prior known history of CHF and last TTE in 2022 with normal systolic function. Patient does report changes to his diuretic regimen at home recently that were made while he was at [facility] and is not sure if these changes were helping him keep fluid off. No LTAC records available, but it seems that patient had home lasix increased to 60mg BID and had metolazone 2.5 every other week added. Patient with fairly limited insight into his medical conditions and his medications, and has poor social support at home with only his son nearby who is a single father. PETH from 11/20 negative (last positive 2/2023).  Blood cultures from admission with B. Diminuta, micrococcus luteus, lysinobacillus species. ID consulted, on vancomycin and zosyn. Nephrology following for JAE; has been on SLED. Etiology of JAE likely HRS, has been in the MICU on pressor support. Pressors have weaned down but still requires them for SLED. PT/OT recommending moderate intensity therapy. On 11/28, abdomen was diffusely distended and he had bilious emesis. XRAY abdomen concerning for ileus vs obstruction. NGT placed with improvement in symptoms, removed on 11/30 and diet advanced to clear liquids. Started on hydrocortisone for  suspected AI but endocrine does not suspect AI and has started tapering steroids. He was weaned off pressors, tolerated HD and stepped down to Hospital Medicine.      Problem List:  Decompensated cirrhosis with ascites  Acute renal failure, concerning for HRS/ATN, now dialysis dependent   Severe obesity, BMI 43  Hx of EtOH Use disorder  Hx of HCC s/p Y90  Shock/Adrenal Insufficiency    Ileus- resolved      Recommendations:  - As of now, debility remains a prohibiting factor for transplant evaluation. It does appear he has improved with PT. Agree with discharge to rehab or SNF to improve physical conditioning at this juncture   - S/p tunneled HD line on 12/16   - Please obtain daily CBC, CMP, PT/INR.    Thank you for involving us in the care of Juan Carlos Yoo Sr.. Please call with any additional concerns or questions.    Shira Hernandez, DO  Gastroenterology PGY- V  [facility]

## 2024-12-17 NOTE — ASSESSMENT & PLAN NOTE
Baseline creatinine is  1.5 . Most recent creatinine and eGFR are listed below.    Recent Labs     12/15/24  0520 12/16/24  0604 12/17/24  0641   CREATININE 3.8* 4.3* 2.6*   EGFRNORACEVR 16.2* 14.0* 25.6*        Plan  - JAE is worsening. Will continue current treatment  - Avoid nephrotoxins and renally dose meds for GFR listed above  - Monitor urine output, serial BMP, and adjust therapy as needed    - Etiology includes volume overload, obstruction, hypotension, possible HRS  Baseline creatinine is 1.5. May require dialysis long term.   Patient tolerated intermittent HD well (12/12)  Avoid nephrotoxins and renally dose meds for GFR listed above  Monitor urine output, serial BMP, and adjust therapy as needed  IR placed RIJ TDC 12/16. Working to get HD chair.

## 2024-12-17 NOTE — PHARMACY MED REC
"    Admission Medication History     The home medication history was taken by Lucila Moya.    You may go to "Admission" then "Reconcile Home Medications" tabs to review and/or act upon these items.     The home medication list has been updated by the Pharmacy department.   Please read ALL comments highlighted in yellow.   Please address this information as you see fit.    Feel free to contact us if you have any questions or require assistance.      The medications listed below were removed from the home medication list. Please reorder if appropriate:  Patient reports no longer taking the following medication(s):  Protonix 40 mg       Medications listed below were obtained from: Patient/family and Analytic software- [id number]  Current Outpatient Medications on File Prior to Encounter   Medication Sig    aMILoride (MIDAMOR) 5 MG Tab Take 2 Tablets (10 Mg Total) By Mouth Once Daily. Start With 5 Mg (1 Tablet) Daily, Increase To 10 Mg (2 Tablets) Daily If Not Losing 3 Pounds A Week.      amLODIPine (NORVASC) 10 MG tablet Take 1 Tablet (10 Mg Total) By Mouth Once Daily.      ascorbic acid, vitamin C, (VITAMIN C) 500 MG tablet Take 1 Tablet (500 Mg Total) By Mouth Once Daily.      cyanocobalamin (VITAMIN B-12) 100 MCG tablet Take 1 Tablet (100 Mcg Total) By Mouth Once Daily.      fish oil-omega-3 fatty acids 300-1,000 mg capsule Take 1G By Mouth 2 (Two) Times Daily.      furosemide (LASIX) 40 MG tablet Take 1 Tablet (40 mg Total) By Mouth Twice Daily  .    nadoloL (CORGARD) 20 MG tablet Take 2 Tablets (40 mg Total) By Mouth Every Evening      potassium chloride SA (KLOR-CON M20) 20 MEQ tablet Take 1 Tablet (20 Meq Total) By Mouth 2 (Two) Times Daily.      tamsulosin (FLOMAX) 0.4 mg Cap Take 1 Capsule (0.4 Mg Total) By Mouth Every Evening.             Lucila Moya  [id number]              .          "

## 2024-12-17 NOTE — PLAN OF CARE
Pt AAOx4. Afebrile. VSS. Midodrine given as scheduled and before dialysis as ordered. Pt reporting 5/10 pain at subclavian perm cath site. FRANCISCO Epstein notified, tylenol ordered and given. No report of pain after administration. Pt up to the restroom with 1x assist. No report of injuries or fall this shift. Bed in the lowest setting, call light within reach.

## 2024-12-17 NOTE — ASSESSMENT & PLAN NOTE
Patient has persistent (7 days or more) atrial fibrillation. Patient is currently in atrial fibrillation. [id number] Score: 1. The patients heart rate in the last 24 hours is as follows:  Pulse  Min: 70  Max: 85       Plan  - Patient's afib is currently controlled  Holding home Eliquis in the setting of recent low blood counts  Continue metoprolol 12.5 mg BID

## 2024-12-17 NOTE — ASSESSMENT & PLAN NOTE
2/2 cirrhosis. See plan below    Recent Labs   Lab 12/17/24  0641   INR 1.6*     Received 3 doses of IV vitamin K.  End Stage Liver Disease precipitated coagulopathy  Heparin d/c d/t thrombocytopenia  CTM

## 2024-12-17 NOTE — ASSESSMENT & PLAN NOTE
Hyponatremia is likely due to Cirrhosis. The patient's most recent sodium results are listed below.  Recent Labs     12/15/24  0520 12/16/24  0604 12/17/24  0641   * 127* 132*       Plan  - Correct the sodium by 4-6mEq in 24 hours.   - Appreciate nephrology recommendations  - Monitor sodium daily  - Patient hyponatremia is stable  - Mgmt with HD

## 2024-12-17 NOTE — PROGRESS NOTES
[facility] - [facility] Medicine  Progress Note    Patient Name: Juan Carlos Yoo Sr.  MRN: [id number]  Patient Class: IP- Inpatient   Admission Date: 11/20/2024  Length of Stay: 27 days  Attending Physician: Giovani Wheat MD  Primary Care Provider: Nyla Rios FNP        Subjective     Principal Problem:Decompensated cirrhosis        HPI:  69M with PMH of alcoholic cirrhosis, esophageal varices, Afib on eliquis, and HTN who presents for c/o worsening diffuse swelling as well as R arm pain/erythema. He was recently hospitalized 1 month ago at [facility] for volume overload in the setting of decompensated cirrhosis. He was treated with IV diuresis and discharged with adjustment to his diuretics, currently on lasix 60mg BID and metolazone 2.5mg every other week as per family. Patient reports diffuse swelling of his upper and lower extremities in addition to distended abdomen and worsening dyspnea, which he believes is due to recent medication adjustment. Family states he is non-compliant with low sodium diet and fluid restriction. Family states he has been compliant with diuretics, but rom't taken eliquis for 3 days due to issue getting refill. He also reports scratching right arm on door frame 3-4 days ago which has become red and painful after wrapping in in bandage. He claims to be compliant with medications, but is a poor historian. He follows with hepatology, not currently active on transplant list. He states he hasn't consumed alcohol for at least 9 months. Has c/o chills, but denies fever, cough, nausea, vomiting, chest pain, dysuria, hematuria, blood in stool. Workup in ED remarkable for VS: T 97.6 HR 94 RR 22 BP 95/56 O2 sat 97% on RA. Hb 6.7, MCV 75, Plt 81, PT/INR 22.6/2.2, Na 128, K 6.1, BUN/Cr 49/5.7, Alb 2.8, AST/ALT 35/9, Ammonia 104, , Trop neg, LA 2.9, CXR: Cardiac size is enlarged similar to prior.  No large volume of pleural fluid noted although there is mild blunting of  the right costophrenic angle which may relate to a small amount of pleural fluid.  Suspected right basilar opacity, to be correlated clinically for infection. RUE venous doppler: No thrombus in central veins of the right upper extremity. Patient received vanc/zosyn and lasix 80mg IVP in ED and will be admitted to hospital medicine service for further management.    Overview/Hospital Course:  69 y.o. male with history of EtOH use disorder, EtOH cirrhosis, HCC s/p y90, morbid obesity BMI 44, HTN, and Afib who presents with severe anasarca and JAE.      Appreciate hepatology and nephrology recommendations.  Diuretics were held because of concern for HRS.  Patient was started on albumin and midodrine per Nephrology recommendations for HRS.  Renal function continued to worsen and recommendation per Nephrology to transfer to ICU for maintaining goal map over 85 on Levophed.  ICU was notified and patient to be assessed to be transferred to ICU.     Patient also has Gram-positive cocci and Gram-negative rods in his blood now.  Possible sources could be got translocation and barrier related infection through skin as he has been significantly edematous and has been oozing.  Has been on vancomycin and Rocephin.  Id was consulted this morning.  Repeat blood cultures were obtained.     Continues to have anemia.  Hemoglobin dropped on 11/20 and was given 1 unit of packed red blood cells.  Did not respond appropriately.  Hemoglobin dropped again to 6.8 on 11/21 and was given 1 unit of packed red blood cells.  Hemoglobin again on 11/22 is 7.2.  No reported melena or hematochezia.  Patient was on Eliquis for atrial fibrillation prior to admission which was held.  Given history of esophageal varices and portal hypertensive gastropathy whereas also could be component of slow bleeding, under production due to CKD and hemolysis while also with decompensated cirrhosis.  Started on Protonix IV b.i.d. and octreotide.     Also clarified  with hepatology.  Given patient's current infection we will await ID recommendations however we will be challenging to consider transplant evaluation at this time.  Trend clinical course     Goal clarification with the patient and family.  Patient at this time wants to be full code and continue with Levophed in ICU.  They would consider discussion with palliative care in a day or so if things do not get better.      In MICU patient started on Levophed, however titration remained difficult given tachycardic response from the patient.      11/24 Trialysis and arterial lines placed overnight and currently on levo and norepi. SLED today.     11/25 Boo dc. Increased midodrine. Continue steroids until d/c pressors. Discussed with Nephrology about our concern for sustained use of pressors to achieve their MAP goals of 85. Will follow up tomorrow.      11/26 Platelets 48. Repeat 38 confirming thrombocytopenia. Concern for HIT. D/c heparin. Increased lactulose dosing. Bladder scan revealed urine in bladder. Boo placed. Off vaso. Continuing levo. Maintain MAP goals 80. PT/OT consulted.      11/27 MAP goal 75-80. Heparin antibody result pending.      11/28 Pt with abdominal pain, decreased bowel movements, emesis. Lactic acid 2.9 and repeat 2.7. Less concerned for mesenteric ischemia and more of a concern was for SBO. NG tube placed with subsequent suction of 500mL fluid. Abdomen less distended after placement and pt subsequently had bowel movement. MAP Goal of 60 with plan to come off pressors entirely and switch to dialysis after permacath, as he is not  liver transplant eligible at this time.      11/29 Pt with ileus. Clear liquids for now. Platelets 24. HIT versus zosyn induced? Peripheral smear sent. Zosyn changed to kaylah. Consent and transfuse 1U. GOC conversation today. Pt opting for long term dialysis.      11/30 naeon. Started back on heparin. One time dose of 120mg lasix today. Hold off SLED/SCUF today and give  IV lasix 120 mg once; plan for SLED/SCUF tomorrow      12/1 Patient is becoming more dependent on dialysis. Not a current liver transplant candidate. Still complaining of abdominal pain after discontinuing the NGT. AXR with evidence of dilated bowel loops. Brown bomb administered. The days following administration of the brown bomb, patient had more frequent bowel movements and was able to pass flatulence. Constipation eventually resolved.   Given the need for pressor support, he was worked up for adrenal insufficiency which was positive. Consequently, he was started on steroids for adrenal insufficiency. His blood pressures improved, however, he still required pressor support, particularly during dialysis. Pressor support was eventually weaned off.   Patient was started on midodrine to help with BP. Nephrology trialed intermittent HD and patient seemed to tolerate it well. Nephrology recommended placement of tunneled catheter for HD, interventional nephrology was consulted. Patient was medically stable for step down to the hospital medicine floors.         Interval History/Significant Events: NAEON. S/p RIJ TCD placement yesterday. Tolerated HD session well yesterday. CM. CM assisting with finding HD chair and discharge planning to [facility].     Review of Systems  Objective:     Vital Signs (Most Recent):  Temp: 97.6 °F (36.4 °C) (12/17/24 1154)  Pulse: 77 (12/17/24 1154)  Resp: 18 (12/17/24 1154)  BP: (!) 96/54 (12/17/24 1154)  SpO2: 96 % (12/17/24 1154) Vital Signs (24h Range):  Temp:  [97.5 °F (36.4 °C)-98.6 °F (37 °C)] 97.6 °F (36.4 °C)  Pulse:  [70-85] 77  Resp:  [17-20] 18  SpO2:  [95 %-99 %] 96 %  BP: ()/(50-66) 96/54   Weight: (!) 137.8 kg (303 lb 12.7 oz)  Body mass index is 40.08 kg/m².      Intake/Output Summary (Last 24 hours) at 12/17/2024 1414  Last data filed at 12/17/2024 0621  Gross per 24 hour   Intake 1030 ml   Output 3200 ml   Net -2170 ml          Physical Exam  Vitals and nursing note  reviewed.   Constitutional:       General: He is awake. He is not in acute distress.     Appearance: He is obese. He is ill-appearing. He is not toxic-appearing.   HENT:      Head: Normocephalic and atraumatic.   Eyes:      Extraocular Movements: Extraocular movements intact.      Conjunctiva/sclera: Conjunctivae normal.   Cardiovascular:      Rate and Rhythm: Normal rate.   Pulmonary:      Effort: Pulmonary effort is normal. No respiratory distress.   Abdominal:      General: There is distension.      Palpations: Abdomen is soft.      Tenderness: There is no abdominal tenderness. There is no guarding or rebound.   Musculoskeletal:         General: No swelling or tenderness.      Right lower leg: Edema present.      Left lower leg: Edema present.      Comments: 2+ pitting edema in bilateral lower extremities   Skin:     General: Skin is warm.      Coloration: Skin is not jaundiced.      Findings: Bruising present.   Neurological:      Mental Status: He is alert and oriented to person, place, and time.      Motor: No weakness.            Vents:     Lines/Drains/Airways       Central Venous Catheter Line  Duration                  Hemodialysis Catheter 12/16/24 1152 right internal jugular 1 day              Peripheral Intravenous Line  Duration                  Peripheral IV - Single Lumen 20 G Anterior;Distal;Left Forearm -- days                  Significant Labs:    CBC/Anemia Profile:  Recent Labs   Lab 12/16/24  0604 12/17/24  0641   WBC 5.42 7.53   HGB 7.4* 9.0*   HCT 22.1* 26.1*   PLT 57* 57*   MCV 82 83   RDW 25.7* 26.4*        Chemistries:  Recent Labs   Lab 12/16/24  0604 12/17/24  0641   * 132*   K 3.4* 3.7    102   CO2 18* 19*   BUN 24* 15   CREATININE 4.3* 2.6*   CALCIUM 8.3* 8.0*   ALBUMIN 2.1* 2.2*   PROT 5.4* 5.7*   BILITOT 3.0* 3.7*   ALKPHOS 75 86   ALT 16 20   AST 31 38   MG 2.2 2.0   PHOS 4.4 2.9       All pertinent labs within the past 24 hours have been reviewed.    Significant  Imaging:  I have reviewed all pertinent imaging results/findings within the past 24 hours.    Assessment and Plan     * Decompensated cirrhosis  Volume Overload  Etoh Cirrhosis  Hx of HCC s/p Y90    MELD 3.0: 31 at 12/17/2024  6:41 AM  MELD-Na: 32 at 12/17/2024  6:41 AM  Calculated from:  Serum Creatinine: On dialysis. Using the maximum value.  Serum Sodium: 132 mmol/L at 12/17/2024  6:41 AM  Total Bilirubin: 3.7 mg/dL at 12/17/2024  6:41 AM  Serum Albumin: 2.2 g/dL at 12/17/2024  6:41 AM  INR(ratio): 1.6 at 12/17/2024  6:41 AM  Age at listing (hypothetical): 69 years  Sex: Male at 12/17/2024  6:41 AM    Cont lactulose  Daily CMP  Per hepatology, pt is not a transplant candidate at this time  Discharge planning to SNF    Adrenal insufficiency  On going problem since admission, most likely multifactorial - component of liver dysfunction, HRS, sepsis on admission.  Continues to require levo, midodrine has been up titrated.   Random cortisol was 6   Persistent hypotension thought to be adrenal insufficiency.   Patient underwent testing with cosyntropin stimulation test, results are not compatible with adrenal insufficiency  Cont hydrocortisone taper      Moderate protein-calorie malnutrition  Nutrition consulted. Most recent weight and BMI monitored-     Measurements:  Wt Readings from Last 1 Encounters:   12/13/24 (!) 137.8 kg (303 lb 12.7 oz)   Body mass index is 40.08 kg/m².    Patient has been screened and assessed by RD.    Malnutrition Type:  Context: acute illness or injury  Level: moderate    Malnutrition Characteristic Summary:  Energy Intake (Malnutrition): less than 75% for greater than 7 days  Subcutaneous Fat (Malnutrition): mild depletion  Muscle Mass (Malnutrition): mild depletion  Fluid Accumulation (Malnutrition): moderate to severe    Interventions/Recommendations (treatment strategy):  1.)      Hypotension        Edema  Volume mgmt with RRT      Debility  Patient with Acute on chronic debility due to  chronic unspecified fatigue, age-related physical debility, and other reduced mobility. The patient's latest AMPAC (Activity Measure for Post Acute Care) Score is listed below.    AM-PAC Score - How much help does the patient need for each activity listed  Basic Mobility Total Score: 17  Turning over in bed (including adjusting bedclothes, sheets and blankets)?: A little  Sitting down on and standing up from a chair with arms (e.g., wheelchair, bedside commode, etc.): A little  Moving from lying on back to sitting on the side of the bed?: A little  Moving to and from a bed to a chair (including a wheelchair)?: A little  Need to walk in hospital room?: A little  Climbing 3-5 steps with a railing?: A lot    Plan  - Progressive mobility protocol initated  - PT/OT consulted  - Fall precautions in place  - PT/OT recommending LI therapy. Planning for SNF      Advanced care planning/counseling discussion        Palliative care encounter  Goals of care, counseling/discussion  Advance Care Planning  Patient requests FULL CODE status and would like to continue current treatment      Abdominal pain        Gram-negative bacteremia  Blood cultures from admission with B. Diminuta, micrococcus luteus, lysinobacillus species. Seen by ID previously who recommended stopping antibiotics due to concern these were contaminants. Repeat blood cultures have been no growth. Has since been transferred to ICU with increased pressor requirement. Blood cultures repeated on 11/24 are no growth x 24 hours.   11/29 Switched from zosyn to meropenem due to concern of thrombocytopenia.   Finished meropenem course 12/8 @ 6 PM    Encephalopathy, metabolic  2/2 decompensated cirrhosis and JAE  Resolved    Hyponatremia  Hyponatremia is likely due to Cirrhosis. The patient's most recent sodium results are listed below.  Recent Labs     12/15/24  0520 12/16/24  0604 12/17/24  0641   * 127* 132*       Plan  - Correct the sodium by 4-6mEq in 24 hours.    - Appreciate nephrology recommendations  - Monitor sodium daily  - Patient hyponatremia is stable  - Mgmt with HD    Thrombocytopenia  The likely etiology of thrombocytopenia is liver disease. The patients 3 most recent labs are listed below.  Recent Labs     12/15/24  0520 12/16/24  0604 12/17/24  0641   PLT 48* 57* 57*       Plan  - Will transfuse if platelet count is <50k (if undergoing surgical procedure or have active bleeding).      Microcytic anemia  Anemia is likely due to chronic disease due to Chronic liver disease. Most recent hemoglobin and hematocrit are listed below.  Recent Labs     12/15/24  0520 12/16/24  0604 12/17/24  0641   HGB 7.3* 7.4* 9.0*   HCT 22.8* 22.1* 26.1*       Plan  - Monitor serial CBC: Daily  - Transfuse PRBC if patient becomes hemodynamically unstable, symptomatic or H/H drops below 7/21.  - Patient has received 1 units of PRBCs on 11/20, 11/21, 12/15  - Patient's anemia is currently stable  - Hb baseline 7-8. No obvious bleeding  - Eliquis held on admission.  DVT prophylaxis held.    Stage 3a chronic kidney disease  ESRD on HD  Nephrology following  Will try to remove more fluid  Strict intake and output  Renally dose all medications  Avoid nephrotoxic agents  IR placed RIJ TDC 12/16  CM assisting with finding HD chair      Severe sepsis  This patient does have evidence of infective focus  My overall impression is sepsis.  Source: Abdominal     Organ dysfunction indicated by Acute kidney injury     Fluid challenge Contraindicated- Fluid bolus is contraindicated in this patient due to End Stage Liver Disease      Post- resuscitation assessment No - Post resuscitation assessment not needed      Source control achieved by: antibiotics  Patient finished course of abx (meropenem) 12/8      JAE (acute kidney injury)  Baseline creatinine is  1.5 . Most recent creatinine and eGFR are listed below.    Recent Labs     12/15/24  0520 12/16/24  0604 12/17/24  0641   CREATININE 3.8* 4.3*  2.6*   EGFRNORACEVR 16.2* 14.0* 25.6*        Plan  - JAE is worsening. Will continue current treatment  - Avoid nephrotoxins and renally dose meds for GFR listed above  - Monitor urine output, serial BMP, and adjust therapy as needed    - Etiology includes volume overload, obstruction, hypotension, possible HRS  Baseline creatinine is 1.5. May require dialysis long term.   Patient tolerated intermittent HD well (12/12)  Avoid nephrotoxins and renally dose meds for GFR listed above  Monitor urine output, serial BMP, and adjust therapy as needed  IR placed RIJ TDC 12/16. Working to get HD chair.    Atrial fibrillation  Patient has persistent (7 days or more) atrial fibrillation. Patient is currently in atrial fibrillation. [id number] Score: 1. The patients heart rate in the last 24 hours is as follows:  Pulse  Min: 70  Max: 85       Plan  - Patient's afib is currently controlled  Holding home Eliquis in the setting of recent low blood counts  Continue metoprolol 12.5 mg BID     Coagulopathy  2/2 cirrhosis. See plan below    Recent Labs   Lab 12/17/24  0641   INR 1.6*     Received 3 doses of IV vitamin K.  End Stage Liver Disease precipitated coagulopathy  Heparin d/c d/t thrombocytopenia  CTM        Goals of care, counseling/discussion  Advance Care Planning    Code Status  In light of the patients advanced and life limiting illness,I engaged the the patient and family in a voluntary conversation about the patient's preferences for care  at the very end of life. The patient wishes to have a natural, peaceful death.  Along those lines, the patient wishes to have CPR or other invasive treatments performed when his heart and/or breathing stops. I communicated to the patient and family. Continue FULL CODE.    A total of 15 min was spent on advance care planning, goals of care discussion, emotional support, formulating and communicating prognosis and exploring burden/benefit of various approaches of treatment. This  discussion occurred on a fully voluntary basis with the verbal consent of the patient and/or family.           VTE Risk Mitigation (From admission, onward)           Ordered     Place sequential compression device  Until discontinued         11/22/24 1329     IP VTE HIGH RISK PATIENT  Once         11/20/24 1622                    Discharge Planning   AUTUMN: 12/20/2024     Code Status: Full Code   Medical Readiness for Discharge Date:   Discharge Plan A: Skilled Nursing Facility   Discharge Delays: None known at this time            Please place Justification for DME        Giovani Wheat MD  Department of Hospital Medicine   [facility] - Transplant Stepdown

## 2024-12-17 NOTE — ASSESSMENT & PLAN NOTE
Bed: FT04  Expected date:   Expected time:   Means of arrival:   Comments:  Triage    On going problem since admission, most likely multifactorial - component of liver dysfunction, HRS, sepsis on admission.  Continues to require levo, midodrine has been up titrated.   Random cortisol was 6   Persistent hypotension thought to be adrenal insufficiency.   Patient underwent testing with cosyntropin stimulation test, results are not compatible with adrenal insufficiency  Cont hydrocortisone taper

## 2024-12-17 NOTE — PLAN OF CARE
Recommendations  Continue Renal, low sodium diet- Fluid per MD  2.  Continue Novasource Renal TID for optimization of calorie intake  3. RD following     Goals: to meet % of EEN/EPN by next RD f/u  Nutrition Goal Status: progressing  Communication of RD Recs:  (POC)

## 2024-12-17 NOTE — ASSESSMENT & PLAN NOTE
Volume Overload  Etoh Cirrhosis  Hx of HCC s/p Y90    MELD 3.0: 31 at 12/17/2024  6:41 AM  MELD-Na: 32 at 12/17/2024  6:41 AM  Calculated from:  Serum Creatinine: On dialysis. Using the maximum value.  Serum Sodium: 132 mmol/L at 12/17/2024  6:41 AM  Total Bilirubin: 3.7 mg/dL at 12/17/2024  6:41 AM  Serum Albumin: 2.2 g/dL at 12/17/2024  6:41 AM  INR(ratio): 1.6 at 12/17/2024  6:41 AM  Age at listing (hypothetical): 69 years  Sex: Male at 12/17/2024  6:41 AM    Cont lactulose  Daily CMP  Per hepatology, pt is not a transplant candidate at this time  Discharge planning to SNF

## 2024-12-17 NOTE — PROGRESS NOTES
4H HD tx completed; no issues on profile 2 setting    Net UF: 2.5L as ordered    Catheter locked with NS    Report given to primary RN

## 2024-12-17 NOTE — SUBJECTIVE & OBJECTIVE
Interval History/Significant Events: NAEON. S/p RIJ TCD placement yesterday. Tolerated HD session well yesterday. CM. CM assisting with finding HD chair and discharge planning to SNF.     Review of Systems  Objective:     Vital Signs (Most Recent):  Temp: 97.6 °F (36.4 °C) (12/17/24 1154)  Pulse: 77 (12/17/24 1154)  Resp: 18 (12/17/24 1154)  BP: (!) 96/54 (12/17/24 1154)  SpO2: 96 % (12/17/24 1154) Vital Signs (24h Range):  Temp:  [97.5 °F (36.4 °C)-98.6 °F (37 °C)] 97.6 °F (36.4 °C)  Pulse:  [70-85] 77  Resp:  [17-20] 18  SpO2:  [95 %-99 %] 96 %  BP: ()/(50-66) 96/54   Weight: (!) 137.8 kg (303 lb 12.7 oz)  Body mass index is 40.08 kg/m².      Intake/Output Summary (Last 24 hours) at 12/17/2024 1414  Last data filed at 12/17/2024 0621  Gross per 24 hour   Intake 1030 ml   Output 3200 ml   Net -2170 ml          Physical Exam  Vitals and nursing note reviewed.   Constitutional:       General: He is awake. He is not in acute distress.     Appearance: He is obese. He is ill-appearing. He is not toxic-appearing.   HENT:      Head: Normocephalic and atraumatic.   Eyes:      Extraocular Movements: Extraocular movements intact.      Conjunctiva/sclera: Conjunctivae normal.   Cardiovascular:      Rate and Rhythm: Normal rate.   Pulmonary:      Effort: Pulmonary effort is normal. No respiratory distress.   Abdominal:      General: There is distension.      Palpations: Abdomen is soft.      Tenderness: There is no abdominal tenderness. There is no guarding or rebound.   Musculoskeletal:         General: No swelling or tenderness.      Right lower leg: Edema present.      Left lower leg: Edema present.      Comments: 2+ pitting edema in bilateral lower extremities   Skin:     General: Skin is warm.      Coloration: Skin is not jaundiced.      Findings: Bruising present.   Neurological:      Mental Status: He is alert and oriented to person, place, and time.      Motor: No weakness.            Vents:      Lines/Drains/Airways       Central Venous Catheter Line  Duration                  Hemodialysis Catheter 12/16/24 1152 right internal jugular 1 day              Peripheral Intravenous Line  Duration                  Peripheral IV - Single Lumen 20 G Anterior;Distal;Left Forearm -- days                  Significant Labs:    CBC/Anemia Profile:  Recent Labs   Lab 12/16/24  0604 12/17/24  0641   WBC 5.42 7.53   HGB 7.4* 9.0*   HCT 22.1* 26.1*   PLT 57* 57*   MCV 82 83   RDW 25.7* 26.4*        Chemistries:  Recent Labs   Lab 12/16/24  0604 12/17/24  0641   * 132*   K 3.4* 3.7    102   CO2 18* 19*   BUN 24* 15   CREATININE 4.3* 2.6*   CALCIUM 8.3* 8.0*   ALBUMIN 2.1* 2.2*   PROT 5.4* 5.7*   BILITOT 3.0* 3.7*   ALKPHOS 75 86   ALT 16 20   AST 31 38   MG 2.2 2.0   PHOS 4.4 2.9       All pertinent labs within the past 24 hours have been reviewed.    Significant Imaging:  I have reviewed all pertinent imaging results/findings within the past 24 hours.

## 2024-12-17 NOTE — PROGRESS NOTES
[facility] Transplant Stepdown  Adult Nutrition  Progress Note    SUMMARY       Recommendations  Continue Renal, low sodium diet- Fluid per MD  2.  Continue Novasource Renal TID for optimization of calorie intake  3. RD following    Goals: to meet % of EEN/EPN by next RD f/u  Nutrition Goal Status: progressing  Communication of RD Recs:  (POC)    Assessment and Plan    Endocrine  Moderate protein-calorie malnutrition  Malnutrition Type:  Context: acute illness or injury  Level: moderate    Related to (etiology):   Inadequate protein- calorie intake    Signs and Symptoms (as evidenced by):   Mild muscle- fat wasting, moderate to severe edema present, and poor PO intake     Malnutrition Characteristic Summary:  Energy Intake (Malnutrition): less than 75% for greater than 7 days  Subcutaneous Fat (Malnutrition): mild depletion  Muscle Mass (Malnutrition): mild depletion  Fluid Accumulation (Malnutrition): moderate to severe    Interventions/Recommendations (treatment strategy):  Collaboration of nutritional care with other providers.   ONS    Nutrition Diagnosis Status:   Continues     Malnutrition Assessment  Malnutrition Context: acute illness or injury  Malnutrition Level: moderate          Energy Intake (Malnutrition): less than 75% for greater than 7 days  Subcutaneous Fat (Malnutrition): mild depletion  Muscle Mass (Malnutrition): mild depletion  Fluid Accumulation (Malnutrition): moderate to severe   Orbital Region (Subcutaneous Fat Loss): mild depletion  Upper Arm Region (Subcutaneous Fat Loss): mild depletion   Mormon Region (Muscle Loss): mild depletion  Clavicle Bone Region (Muscle Loss): mild depletion  Clavicle and Acromion Bone Region (Muscle Loss): mild depletion  Scapular Bone Region (Muscle Loss): mild depletion  Dorsal Hand (Muscle Loss): well nourished  Patellar Region (Muscle Loss):  (too much fluid)  Anterior Thigh Region (Muscle Loss):  (too much fluid)  Posterior Calf Region (Muscle Loss):   "(too much fluid)                 Reason for Assessment    Reason For Assessment: RD follow-up  Diagnosis: liver disease (Decompensated cirrhosis)  General Information Comments: RD follow-up: Pt consuming 50-75% of meals provided along with Nova source Renal supplements. Pt continues to meet criteria for malnutrition- is improving. RD following.  Nutrition Discharge Planning: Regular Diet, fluid per MD  Nutrition Related Social Determinants of Health: SDOH: None Identified     Nutrition Risk Screen    Nutrition Risk Screen: no indicators present    Nutrition/Diet History    Patient Reported Diet/Restrictions/Preferences: general  Typical Food/Fluid Intake: 2 meals/day + snacks  Spiritual, Cultural Beliefs, Shinto Practices, Values that Affect Care: no  Food Allergies: NKFA    Anthropometrics    Temp: 97.6 °F (36.4 °C)  Height Method: Stated  Height: 6' 1" (185.4 cm)  Height (inches): 73 in  Weight Method: Bed Scale  Weight: (!) 137.8 kg (303 lb 12.7 oz)  Weight (lb): (!) 303.8 lb  Ideal Body Weight (IBW), Male: 184 lb  % Ideal Body Weight, Male (lb): 183.32 %  BMI (Calculated): 40.1       Lab/Procedures/Meds    Pertinent Labs Reviewed: reviewed  Pertinent Labs Comments: Na: 133, K: 3.3, Cr: 2.7, GFR: 24.4, gluc: 114,  PRO: 5.2, tbili: 3.7  Pertinent Medications Reviewed: reviewed  Pertinent Medications Comments: Famotidine, lactulose, K Bicarb    Estimated/Assessed Needs    Weight Used For Calorie Calculations: (!) 141.1 kg (311 lb 1.1 oz)  Energy Calorie Requirements (kcal): 2220 kcal  Energy Need Method: RallsDemario Elise  Protein Requirements: 167- 184 (2.0-2.2g/kg of IBW)  Weight Used For Protein Calculations: 83.5 kg (184 lb) (IBW d/t BMI >40.0)  Fluid Requirements (mL): as per MD  RDA Method (mL): 2220     Nutrition Prescription Ordered    Current Diet Order: Renal, low sodium diet   Oral Nutrition Supplement: Novasource Renal     Evaluation of Received Nutrient/Fluid Intake    I/O: -6.4 L since admit  Energy " Calories Required: meeting needs  Protein Required:  meeting needs  Fluid Required:  (1 ml or fluid as per MD)  % Intake of Estimated Energy Needs: %  % Meal Intake: 75%    Nutrition Risk    Level of Risk/Frequency of Follow-up: low - moderate     Monitor and Evaluation    Food and Nutrient Intake: food and beverage intake  Food and Nutrient Adminstration: diet order  Knowledge/Beliefs/Attitudes: food and nutrition knowledge/skill, beliefs and attitudes  Physical Activity and Function: nutrition-related ADLs and IADLs  Anthropometric Measurements: weight, weight change, body mass index  Biochemical Data, Medical Tests and Procedures: electrolyte and renal panel, gastrointestinal profile, glucose/endocrine profile, lipid profile, inflammatory profile  Nutrition-Focused Physical Findings: overall appearance, skin     Nutrition Follow-Up    RD Follow-up?: Yes

## 2024-12-17 NOTE — PT/OT/SLP PROGRESS
"Physical Therapy Treatment    Patient Name:  Juan Carlos Yoo Sr.   MRN:  [id number]    Recommendations:     Discharge Recommendations: Moderate Intensity Therapy  Discharge Equipment Recommendations: walker, rolling, wheelchair, bedside commode, grab bar, bath bench  Barriers to discharge: None    Assessment:     Juan Carlos Yoo Sr. is a 69 y.o. male admitted with a medical diagnosis of Decompensated cirrhosis.  He presents with the following impairments/functional limitations: weakness, impaired endurance, impaired sensation, impaired self care skills, impaired functional mobility, gait instability, impaired balance. Pt agreeable for therapy, states needing to go to bathroom, pt performs bed mobility, stand and gait w/min A, ambulates to bathroom and then to sit up in chair, pt fatigued after activity, total A required to wipe bottom.    Rehab Prognosis: Fair; patient would benefit from acute skilled PT services to address these deficits and reach maximum level of function.    Recent Surgery: * No surgery found *      Plan:     During this hospitalization, patient to be seen 4 x/week to address the identified rehab impairments via gait training, therapeutic activities, therapeutic exercises, neuromuscular re-education and progress toward the following goals:    Plan of Care Expires:  12/27/24    Subjective     Chief Complaint: Fatigue  Patient/Family Comments/goals: "Can we check to see if I passed gas or something"  Pain/Comfort:  Pain Rating 1: 0/10  Pain Rating Post-Intervention 1: 0/10      Objective:     Communicated with RN prior to session.  Patient found supine with Trialysis, telemetry upon PT entry to room.     General Precautions: Standard, fall  Orthopedic Precautions: N/A  Braces: N/A  Respiratory Status: Room air     Functional Mobility:    Bed Mobility:   Rolling: to R side with minimum assistance  Supine > Sit: minimum assistance  Transfers:   Sit <> Stand Transfer: minimum assistance from EOB using rolling " walker ; pt stands from raised BSC while in bathroom w/min A and VC's for hand placement    Balance:   Sitting balance: GOOD: Maintains balance through MODERATE excursions of active trunk movement  Standing balance:   FAIR+: Takes MINIMAL challenges from all directions  FAIR: Needs CONTACT GUARD during gait                 Gait:  Distance: 15' x 2   Assistive Device: RW  Assistance Level: contact guard assistance  Gait Assessment: slow osmar, flexed forward posture, step through steps, ambulates to bathroom to have BM before ambulating to chair             AM-PAC 6 CLICK MOBILITY  Turning over in bed (including adjusting bedclothes, sheets and blankets)?: 3  Sitting down on and standing up from a chair with arms (e.g., wheelchair, bedside commode, etc.): 3  Moving from lying on back to sitting on the side of the bed?: 3  Moving to and from a bed to a chair (including a wheelchair)?: 3  Need to walk in hospital room?: 3  Climbing 3-5 steps with a railing?: 2  Basic Mobility Total Score: 17       Treatment & Education:  Education:  PT role and PoC to increase strength and personal mobility    Patient left up in chair with all lines intact, call button in reach, and RN notified..    GOALS:   Multidisciplinary Problems       Physical Therapy Goals          Problem: Physical Therapy    Goal Priority Disciplines Outcome Interventions   Physical Therapy Goal     PT, PT/OT Progressing    Description: Goals to be met by: 24     Patient will increase functional independence with mobility by performin. Supine to sit with Minimal Assistance  2. Sit to stand transfer with Stand By Assistance  3. Bed to chair transfer with Stand By Assistance using LRAD  4. Gait  x 150 feet with Stand By Assistance using No LRAD.   5. Pt sit on EOB x 15 min with SBA and perform functional activities to increased functional mobility.                          Time Tracking:     PT Received On: 24  PT Start Time: 1450     PT  Stop Time: 1513  PT Total Time (min): 23 min     Billable Minutes: Gait Training 17min and Therapeutic Activity 16min    Treatment Type: Treatment  PT/PTA: PTA     Number of PTA visits since last PT visit: 1 12/17/2024

## 2024-12-18 LAB
ALBUMIN SERPL BCP-MCNC: 2.2 G/DL (ref 3.5–5.2)
ALP SERPL-CCNC: 97 U/L (ref 40–150)
ALT SERPL W/O P-5'-P-CCNC: 21 U/L (ref 10–44)
ANION GAP SERPL CALC-SCNC: 9 MMOL/L (ref 8–16)
AST SERPL-CCNC: 35 U/L (ref 10–40)
BASOPHILS # BLD AUTO: 0.04 K/UL (ref 0–0.2)
BASOPHILS NFR BLD: 0.6 % (ref 0–1.9)
BILIRUB SERPL-MCNC: 3.2 MG/DL (ref 0.1–1)
BUN SERPL-MCNC: 20 MG/DL (ref 8–23)
CALCIUM SERPL-MCNC: 8.4 MG/DL (ref 8.7–10.5)
CHLORIDE SERPL-SCNC: 100 MMOL/L (ref 95–110)
CO2 SERPL-SCNC: 19 MMOL/L (ref 23–29)
CREAT SERPL-MCNC: 3.5 MG/DL (ref 0.5–1.4)
DIFFERENTIAL METHOD BLD: ABNORMAL
EOSINOPHIL # BLD AUTO: 0.1 K/UL (ref 0–0.5)
EOSINOPHIL NFR BLD: 1.7 % (ref 0–8)
ERYTHROCYTE [DISTWIDTH] IN BLOOD BY AUTOMATED COUNT: 26.8 % (ref 11.5–14.5)
EST. GFR  (NO RACE VARIABLE): 17.9 ML/MIN/1.73 M^2
GLUCOSE SERPL-MCNC: 118 MG/DL (ref 70–110)
HCT VFR BLD AUTO: 25.8 % (ref 40–54)
HGB BLD-MCNC: 8.4 G/DL (ref 14–18)
IMM GRANULOCYTES # BLD AUTO: 0.05 K/UL (ref 0–0.04)
IMM GRANULOCYTES NFR BLD AUTO: 0.8 % (ref 0–0.5)
INR PPP: 1.6 (ref 0.8–1.2)
LYMPHOCYTES # BLD AUTO: 1 K/UL (ref 1–4.8)
LYMPHOCYTES NFR BLD: 14.8 % (ref 18–48)
MAGNESIUM SERPL-MCNC: 2.1 MG/DL (ref 1.6–2.6)
MCH RBC QN AUTO: 26.8 PG (ref 27–31)
MCHC RBC AUTO-ENTMCNC: 32.6 G/DL (ref 32–36)
MCV RBC AUTO: 82 FL (ref 82–98)
MONOCYTES # BLD AUTO: 1.1 K/UL (ref 0.3–1)
MONOCYTES NFR BLD: 16.2 % (ref 4–15)
NEUTROPHILS # BLD AUTO: 4.3 K/UL (ref 1.8–7.7)
NEUTROPHILS NFR BLD: 65.9 % (ref 38–73)
NRBC BLD-RTO: 0 /100 WBC
PHOSPHATE SERPL-MCNC: 3.8 MG/DL (ref 2.7–4.5)
PLATELET # BLD AUTO: 60 K/UL (ref 150–450)
PMV BLD AUTO: ABNORMAL FL (ref 9.2–12.9)
POTASSIUM SERPL-SCNC: 3.2 MMOL/L (ref 3.5–5.1)
PROT SERPL-MCNC: 5.9 G/DL (ref 6–8.4)
PROTHROMBIN TIME: 17.2 SEC (ref 9–12.5)
RBC # BLD AUTO: 3.14 M/UL (ref 4.6–6.2)
SODIUM SERPL-SCNC: 128 MMOL/L (ref 136–145)
WBC # BLD AUTO: 6.5 K/UL (ref 3.9–12.7)

## 2024-12-18 PROCEDURE — 36415 COLL VENOUS BLD VENIPUNCTURE: CPT | Performed by: INTERNAL MEDICINE

## 2024-12-18 PROCEDURE — 84100 ASSAY OF PHOSPHORUS: CPT

## 2024-12-18 PROCEDURE — 25000003 PHARM REV CODE 250: Performed by: INTERNAL MEDICINE

## 2024-12-18 PROCEDURE — 63600175 PHARM REV CODE 636 W HCPCS

## 2024-12-18 PROCEDURE — 97530 THERAPEUTIC ACTIVITIES: CPT | Mod: CO

## 2024-12-18 PROCEDURE — 25000003 PHARM REV CODE 250

## 2024-12-18 PROCEDURE — 97116 GAIT TRAINING THERAPY: CPT | Mod: CQ

## 2024-12-18 PROCEDURE — 85025 COMPLETE CBC W/AUTO DIFF WBC: CPT

## 2024-12-18 PROCEDURE — 90935 HEMODIALYSIS ONE EVALUATION: CPT

## 2024-12-18 PROCEDURE — 80053 COMPREHEN METABOLIC PANEL: CPT

## 2024-12-18 PROCEDURE — 20600001 HC STEP DOWN PRIVATE ROOM

## 2024-12-18 PROCEDURE — 97110 THERAPEUTIC EXERCISES: CPT | Mod: CQ

## 2024-12-18 PROCEDURE — 85610 PROTHROMBIN TIME: CPT | Performed by: INTERNAL MEDICINE

## 2024-12-18 PROCEDURE — 97535 SELF CARE MNGMENT TRAINING: CPT | Mod: CO

## 2024-12-18 PROCEDURE — 83735 ASSAY OF MAGNESIUM: CPT | Performed by: INTERNAL MEDICINE

## 2024-12-18 RX ORDER — POTASSIUM CHLORIDE 750 MG/1
30 CAPSULE, EXTENDED RELEASE ORAL ONCE
Status: COMPLETED | OUTPATIENT
Start: 2024-12-18 | End: 2024-12-18

## 2024-12-18 RX ORDER — SODIUM CHLORIDE 9 MG/ML
INJECTION, SOLUTION INTRAVENOUS ONCE
Status: DISCONTINUED | OUTPATIENT
Start: 2024-12-19 | End: 2024-12-18

## 2024-12-18 RX ORDER — HEPARIN SODIUM 1000 [USP'U]/ML
1000 INJECTION, SOLUTION INTRAVENOUS; SUBCUTANEOUS ONCE
Status: COMPLETED | OUTPATIENT
Start: 2024-12-18 | End: 2024-12-18

## 2024-12-18 RX ADMIN — HYDROCORTISONE 5 MG: 5 TABLET ORAL at 05:12

## 2024-12-18 RX ADMIN — MICONAZOLE NITRATE: 20 OINTMENT TOPICAL at 08:12

## 2024-12-18 RX ADMIN — LACTULOSE 30 G: 20 SOLUTION ORAL at 05:12

## 2024-12-18 RX ADMIN — POTASSIUM CHLORIDE 30 MEQ: 750 CAPSULE, EXTENDED RELEASE ORAL at 05:12

## 2024-12-18 RX ADMIN — LACTULOSE 30 G: 20 SOLUTION ORAL at 08:12

## 2024-12-18 RX ADMIN — MIDODRINE HYDROCHLORIDE 20 MG: 5 TABLET ORAL at 05:12

## 2024-12-18 RX ADMIN — MIDODRINE HYDROCHLORIDE 20 MG: 5 TABLET ORAL at 08:12

## 2024-12-18 RX ADMIN — METOPROLOL TARTRATE 12.5 MG: 25 TABLET, FILM COATED ORAL at 08:12

## 2024-12-18 RX ADMIN — TAMSULOSIN HYDROCHLORIDE 0.8 MG: 0.4 CAPSULE ORAL at 08:12

## 2024-12-18 RX ADMIN — MIDODRINE HYDROCHLORIDE 15 MG: 5 TABLET ORAL at 03:12

## 2024-12-18 RX ADMIN — FAMOTIDINE 20 MG: 20 TABLET ORAL at 08:12

## 2024-12-18 RX ADMIN — MICONAZOLE NITRATE: 20 OINTMENT TOPICAL at 09:12

## 2024-12-18 RX ADMIN — HYDROCORTISONE 10 MG: 5 TABLET ORAL at 05:12

## 2024-12-18 RX ADMIN — HEPARIN SODIUM 1000 UNITS: 1000 INJECTION, SOLUTION INTRAVENOUS; SUBCUTANEOUS at 03:12

## 2024-12-18 RX ADMIN — MIDODRINE HYDROCHLORIDE 20 MG: 5 TABLET ORAL at 11:12

## 2024-12-18 NOTE — NURSING
Patient arrived in bed . Both lumens of  HD catheter flushes  good . Dialysis treatment started .   Report received  from primary Nurse .

## 2024-12-18 NOTE — ASSESSMENT & PLAN NOTE
2/2 cirrhosis. See plan below    Recent Labs   Lab 12/18/24  0524   INR 1.6*     Received 3 doses of IV vitamin K.  End Stage Liver Disease precipitated coagulopathy  Heparin d/c d/t thrombocytopenia  CTM

## 2024-12-18 NOTE — PLAN OF CARE
Problem: Acute Kidney Injury/Impairment  Goal: Fluid and Electrolyte Balance  Outcome: Progressing  Goal: Improved Oral Intake  Outcome: Progressing      Anemia, unspecified type

## 2024-12-18 NOTE — PLAN OF CARE
69 year-old male admitted 11/20/24 for Decompensated Cirrhosis/Fluid Volume Overload  -AAOx4, -1 person assist w/walker  -20 G Lt Wrist  -Rt subclavian Permcath  -Cr 3.5  -HD today, 1.5 L removed  -K 3.2/Pot 30 mEq PO (1)  -Renal Diet, 1500ml FR  -Tele Afib  -+3 edema generalized  -skin tears on upper ext, Mepilex in place  -scrotum/thigh excoriation, cleansed and Triad applied  -PT/OT today, pt ambulated in witt & sat up in chair for 3 hrs  -pt sitting up in bed, bed in lowest position, wheels locked, non-skid socks in place, call light within reach   -awaiting SNF/OP HD placement

## 2024-12-18 NOTE — PROGRESS NOTES
[facility]  Nephrology  Progress Note    Patient Name: Juan Carlos Yoo Sr.  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 Recent admission 10/4 at [facility] for decompensated hepatic cirrhosis where he was discharged with oral diuretic regimen including furosemide 60 mg BID and metolazone 2.5 mg daily, low salt diet with fluid restriction. It is unclear if patient is adherent to these medications or lifestyle modifications. W/u notable for anemia with hb 6.7 s/p 1 unit pRBC 11/20 and JAE on CKD w elevated BUN 49/creatinine 5.9, hyperkalemia (K 6.1>5.1 after shifting), bicarb 18, elevated lactate up to 3.5. Nephrology consulted for JAE with c/o HRS    Interval History: Planing for next HD session tomorrow.    Review of patient's allergies indicates:  No Known Allergies  Current Facility-Administered Medications   Medication Frequency    0.9%  NaCl infusion (for blood administration) Q24H PRN    albuterol-ipratropium 2.5 mg-0.5 mg/3 mL nebulizer solution 3 mL Q6H PRN    aluminum-magnesium hydroxide-simethicone 200-200-20 mg/5 mL suspension 30 mL QID PRN    dextrose 10% bolus 125 mL 125 mL PRN    dextrose 10% bolus 250 mL 250 mL PRN    famotidine tablet 20 mg Every other day    glucagon (human recombinant) injection 1 mg PRN    glucose chewable tablet 16 g PRN    glucose chewable tablet 24 g PRN    hydrocortisone tablet 10 mg Q24H    Followed by    [START ON 12/24/2024] hydrocortisone tablet 5 mg Q24H     [START ON 12/18/2024] hydrocortisone tablet 5 mg Q24H    lactulose 20 gram/30 mL solution Soln 30 g TID    melatonin tablet 6 mg Nightly PRN    metoprolol tartrate (LOPRESSOR) split tablet 12.5 mg BID    miconazole nitrate 2% ointment BID    midodrine tablet 15 mg PRN    midodrine tablet 20 mg Q8H    midodrine tablet 20 mg Daily PRN    naloxone 0.4 mg/mL injection 0.02 mg PRN    ondansetron injection 4 mg Q8H PRN    polyethylene glycol packet 17 g BID PRN    simethicone chewable tablet 80 mg QID PRN    sodium chloride 0.9% flush 10 mL PRN    tamsulosin 24 hr capsule 0.8 mg QHS       Objective:     Vital Signs (Most Recent):  Temp: 97.5 °F (36.4 °C) (12/17/24 1554)  Pulse: 84 (12/17/24 1554)  Resp: 18 (12/17/24 1554)  BP: (!) 94/54 (12/17/24 1554)  SpO2: 100 % (12/17/24 1554) Vital Signs (24h Range):  Temp:  [97.5 °F (36.4 °C)-98.6 °F (37 °C)] 97.5 °F (36.4 °C)  Pulse:  [70-85] 84  Resp:  [17-20] 18  SpO2:  [95 %-100 %] 100 %  BP: ()/(50-66) 94/54     Weight: (!) 137.8 kg (303 lb 12.7 oz) (12/13/24 0400)  Body mass index is 40.08 kg/m².  Body surface area is 2.66 meters squared.    I/O last 3 completed shifts:  In: 1240 [P.O.:990; Other:250]  Out: 3200 [Urine:200; Other:3000]     Physical Exam  Constitutional:       Appearance: He is obese.   Pulmonary:      Effort: Pulmonary effort is normal.   Musculoskeletal:         General: Swelling present.      Right lower leg: Edema present.      Left lower leg: Edema present.   Neurological:      General: No focal deficit present.      Mental Status: He is alert and oriented to person, place, and time.          Significant Labs:  BMP:   Recent Labs   Lab 12/17/24  0641      *   K 3.7      CO2 19*   BUN 15   CREATININE 2.6*   CALCIUM 8.0*   MG 2.0     CBC:   Recent Labs   Lab 12/17/24  0641   WBC 7.53   RBC 3.15*   HGB 9.0*   HCT 26.1*   PLT 57*   MCV 83   MCH 28.6   MCHC 34.5        Significant Imaging:  Labs: Reviewed  Assessment/Plan:      Renal/  JAE (acute kidney injury)  JAE is likely due to pre-renal azotemia due to intravascular volume depletion secondary to decompensated hepatic cirrhosis with volume overload and acute tubular necrosis caused by hemodynamic instability, renal hypoperfusion, severe sepsis with GNR bacteremia. Initial presentation concerning for hepatorenal syndrome, transferred to MICU for vasopressors without success in reaching MAP goal 85-90 for treatment of HRS. Currently, patient with oligouric JAE more contributed by ATN as above.     Recommendations:  - Planing for HD session tonorrow, orders placed, will keep on MWF schedule  - Will do dialysis with low temperature, high calcium bath, higher duration of 3 hours, ultrafiltration modelling, PRN midodrine, all measures to prevent intra dialytic hypotension and effectively removes fluid.            Thank you for your consult. I will follow-up with patient. Please contact us if you have any additional questions.    Betty Terrazas MD  Nephrology  [facility]    ATTENDING PHYSICIAN ATTESTATION  I have personally verified the history and examined the patient. I thoroughly reviewed the demographic, clinical, laboratorial and imaging information available in medical records. I agree with the assessment and recommendations provided by the subspecialty resident who was under my supervision.

## 2024-12-18 NOTE — SUBJECTIVE & OBJECTIVE
Interval History: LIANG YING, sitting in chair this AM, reported slight improvement.    Review of patient's allergies indicates:  No Known Allergies  Current Facility-Administered Medications   Medication Frequency    albuterol-ipratropium 2.5 mg-0.5 mg/3 mL nebulizer solution 3 mL Q6H PRN    aluminum-magnesium hydroxide-simethicone 200-200-20 mg/5 mL suspension 30 mL QID PRN    dextrose 10% bolus 125 mL 125 mL PRN    dextrose 10% bolus 250 mL 250 mL PRN    famotidine tablet 20 mg Every other day    glucagon (human recombinant) injection 1 mg PRN    glucose chewable tablet 16 g PRN    glucose chewable tablet 24 g PRN    hydrocortisone tablet 10 mg Q24H    Followed by    [START ON 12/24/2024] hydrocortisone tablet 5 mg Q24H    hydrocortisone tablet 5 mg Q24H    lactulose 20 gram/30 mL solution Soln 30 g TID    melatonin tablet 6 mg Nightly PRN    metoprolol tartrate (LOPRESSOR) split tablet 12.5 mg BID    miconazole nitrate 2% ointment BID    midodrine tablet 15 mg PRN    midodrine tablet 20 mg Q8H    midodrine tablet 20 mg Daily PRN    naloxone 0.4 mg/mL injection 0.02 mg PRN    ondansetron injection 4 mg Q8H PRN    polyethylene glycol packet 17 g BID PRN    potassium chloride CR capsule 30 mEq Once    simethicone chewable tablet 80 mg QID PRN    sodium chloride 0.9% flush 10 mL PRN    tamsulosin 24 hr capsule 0.8 mg QHS       Objective:     Vital Signs (Most Recent):  Temp: 98.4 °F (36.9 °C) (12/18/24 1200)  Pulse: 88 (12/18/24 1547)  Resp: 18 (12/18/24 1200)  BP: (!) 110/59 (12/18/24 1545)  SpO2: 100 % (12/18/24 1545) Vital Signs (24h Range):  Temp:  [97.6 °F (36.4 °C)-98.4 °F (36.9 °C)] 98.4 °F (36.9 °C)  Pulse:  [72-91] 88  Resp:  [18] 18  SpO2:  [99 %-100 %] 100 %  BP: ()/(50-61) 110/59     Weight: (!) 137.8 kg (303 lb 12.7 oz) (12/13/24 0400)  Body mass index is 40.08 kg/m².  Body surface area is 2.66 meters squared.    I/O last 3 completed shifts:  In: 2115 [P.O.:1865; Other:250]  Out: 3200 [Urine:200;  Other:3000]     Physical Exam  Constitutional:       Appearance: He is obese.   Pulmonary:      Effort: Pulmonary effort is normal. No respiratory distress.   Musculoskeletal:         General: Swelling present.      Right lower leg: Edema present.      Left lower leg: Edema present.   Neurological:      General: No focal deficit present.      Mental Status: He is alert and oriented to person, place, and time.          Significant Labs:  BMP:   Recent Labs   Lab 12/18/24  0524   *   *   K 3.2*      CO2 19*   BUN 20   CREATININE 3.5*   CALCIUM 8.4*   MG 2.1     CBC:   Recent Labs   Lab 12/18/24  0524   WBC 6.50   RBC 3.14*   HGB 8.4*   HCT 25.8*   PLT 60*   MCV 82   MCH 26.8*   MCHC 32.6        Significant Imaging:  Labs: Reviewed

## 2024-12-18 NOTE — SUBJECTIVE & OBJECTIVE
Interval History/Significant Events: NAEON. Planning for HD today. No acute issues. Discharge planning. CM assisting with finding HD chair and discharge planning to SNF.     Review of Systems  Objective:     Vital Signs (Most Recent):  Temp: 98.4 °F (36.9 °C) (12/18/24 1200)  Pulse: 86 (12/18/24 1200)  Resp: 18 (12/18/24 1200)  BP: (!) 115/56 (12/18/24 1200)  SpO2: 100 % (12/18/24 1200) Vital Signs (24h Range):  Temp:  [97.5 °F (36.4 °C)-98.4 °F (36.9 °C)] 98.4 °F (36.9 °C)  Pulse:  [72-87] 86  Resp:  [18] 18  SpO2:  [99 %-100 %] 100 %  BP: ()/(54-61) 115/56   Weight: (!) 137.8 kg (303 lb 12.7 oz)  Body mass index is 40.08 kg/m².      Intake/Output Summary (Last 24 hours) at 12/18/2024 1418  Last data filed at 12/18/2024 1157  Gross per 24 hour   Intake 1745 ml   Output --   Net 1745 ml          Physical Exam  Vitals and nursing note reviewed.   Constitutional:       General: He is awake. He is not in acute distress.     Appearance: He is obese. He is ill-appearing. He is not toxic-appearing.   HENT:      Head: Normocephalic and atraumatic.   Eyes:      Extraocular Movements: Extraocular movements intact.      Conjunctiva/sclera: Conjunctivae normal.   Cardiovascular:      Rate and Rhythm: Normal rate.   Pulmonary:      Effort: Pulmonary effort is normal. No respiratory distress.   Abdominal:      General: There is distension.      Palpations: Abdomen is soft.      Tenderness: There is no abdominal tenderness. There is no guarding or rebound.   Musculoskeletal:         General: No swelling or tenderness.      Right lower leg: Edema present.      Left lower leg: Edema present.      Comments: 2+ pitting edema in bilateral lower extremities   Skin:     General: Skin is warm.      Coloration: Skin is not jaundiced.      Findings: Bruising present.   Neurological:      Mental Status: He is alert and oriented to person, place, and time.      Motor: No weakness.            Vents:     Lines/Drains/Airways        Central Venous Catheter Line  Duration                  Hemodialysis Catheter 12/16/24 1152 right internal jugular 2 days              Peripheral Intravenous Line  Duration                  Peripheral IV - Single Lumen 20 G Anterior;Distal;Left Forearm -- days                  Significant Labs:    CBC/Anemia Profile:  Recent Labs   Lab 12/17/24  0641 12/18/24  0524   WBC 7.53 6.50   HGB 9.0* 8.4*   HCT 26.1* 25.8*   PLT 57* 60*   MCV 83 82   RDW 26.4* 26.8*        Chemistries:  Recent Labs   Lab 12/17/24  0641 12/18/24  0524   * 128*   K 3.7 3.2*    100   CO2 19* 19*   BUN 15 20   CREATININE 2.6* 3.5*   CALCIUM 8.0* 8.4*   ALBUMIN 2.2* 2.2*   PROT 5.7* 5.9*   BILITOT 3.7* 3.2*   ALKPHOS 86 97   ALT 20 21   AST 38 35   MG 2.0 2.1   PHOS 2.9 3.8       All pertinent labs within the past 24 hours have been reviewed.    Significant Imaging:  I have reviewed all pertinent imaging results/findings within the past 24 hours.

## 2024-12-18 NOTE — ASSESSMENT & PLAN NOTE
Volume Overload  Etoh Cirrhosis  Hx of HCC s/p Y90    MELD 3.0: 32 at 12/18/2024  5:24 AM  MELD-Na: 32 at 12/18/2024  5:24 AM  Calculated from:  Serum Creatinine: On dialysis. Using the maximum value.  Serum Sodium: 128 mmol/L at 12/18/2024  5:24 AM  Total Bilirubin: 3.2 mg/dL at 12/18/2024  5:24 AM  Serum Albumin: 2.2 g/dL at 12/18/2024  5:24 AM  INR(ratio): 1.6 at 12/18/2024  5:24 AM  Age at listing (hypothetical): 69 years  Sex: Male at 12/18/2024  5:24 AM    Cont lactulose  Daily CMP  Per hepatology, pt is not a transplant candidate at this time  Discharge planning to SNF

## 2024-12-18 NOTE — ASSESSMENT & PLAN NOTE
Hyponatremia is likely due to Cirrhosis. The patient's most recent sodium results are listed below.  Recent Labs     12/16/24  0604 12/17/24  0641 12/18/24  0524   * 132* 128*       Plan  - Correct the sodium by 4-6mEq in 24 hours.   - Appreciate nephrology recommendations  - Monitor sodium daily  - Patient hyponatremia is stable  - Mgmt with HD

## 2024-12-18 NOTE — SUBJECTIVE & OBJECTIVE
Interval History: Planing for next HD session tomorrow.    Review of patient's allergies indicates:  No Known Allergies  Current Facility-Administered Medications   Medication Frequency    0.9%  NaCl infusion (for blood administration) Q24H PRN    albuterol-ipratropium 2.5 mg-0.5 mg/3 mL nebulizer solution 3 mL Q6H PRN    aluminum-magnesium hydroxide-simethicone 200-200-20 mg/5 mL suspension 30 mL QID PRN    dextrose 10% bolus 125 mL 125 mL PRN    dextrose 10% bolus 250 mL 250 mL PRN    famotidine tablet 20 mg Every other day    glucagon (human recombinant) injection 1 mg PRN    glucose chewable tablet 16 g PRN    glucose chewable tablet 24 g PRN    hydrocortisone tablet 10 mg Q24H    Followed by    [START ON 12/24/2024] hydrocortisone tablet 5 mg Q24H    [START ON 12/18/2024] hydrocortisone tablet 5 mg Q24H    lactulose 20 gram/30 mL solution Soln 30 g TID    melatonin tablet 6 mg Nightly PRN    metoprolol tartrate (LOPRESSOR) split tablet 12.5 mg BID    miconazole nitrate 2% ointment BID    midodrine tablet 15 mg PRN    midodrine tablet 20 mg Q8H    midodrine tablet 20 mg Daily PRN    naloxone 0.4 mg/mL injection 0.02 mg PRN    ondansetron injection 4 mg Q8H PRN    polyethylene glycol packet 17 g BID PRN    simethicone chewable tablet 80 mg QID PRN    sodium chloride 0.9% flush 10 mL PRN    tamsulosin 24 hr capsule 0.8 mg QHS       Objective:     Vital Signs (Most Recent):  Temp: 97.5 °F (36.4 °C) (12/17/24 1554)  Pulse: 84 (12/17/24 1554)  Resp: 18 (12/17/24 1554)  BP: (!) 94/54 (12/17/24 1554)  SpO2: 100 % (12/17/24 1554) Vital Signs (24h Range):  Temp:  [97.5 °F (36.4 °C)-98.6 °F (37 °C)] 97.5 °F (36.4 °C)  Pulse:  [70-85] 84  Resp:  [17-20] 18  SpO2:  [95 %-100 %] 100 %  BP: ()/(50-66) 94/54     Weight: (!) 137.8 kg (303 lb 12.7 oz) (12/13/24 0400)  Body mass index is 40.08 kg/m².  Body surface area is 2.66 meters squared.    I/O last 3 completed shifts:  In: 1240 [P.O.:990; Other:250]  Out: 3200  [Urine:200; Other:3000]     Physical Exam  Constitutional:       Appearance: He is obese.   Pulmonary:      Effort: Pulmonary effort is normal.   Musculoskeletal:         General: Swelling present.      Right lower leg: Edema present.      Left lower leg: Edema present.   Neurological:      General: No focal deficit present.      Mental Status: He is alert and oriented to person, place, and time.          Significant Labs:  BMP:   Recent Labs   Lab 12/17/24  0641      *   K 3.7      CO2 19*   BUN 15   CREATININE 2.6*   CALCIUM 8.0*   MG 2.0     CBC:   Recent Labs   Lab 12/17/24  0641   WBC 7.53   RBC 3.15*   HGB 9.0*   HCT 26.1*   PLT 57*   MCV 83   MCH 28.6   MCHC 34.5        Significant Imaging:  Labs: Reviewed

## 2024-12-18 NOTE — PROGRESS NOTES
[facility]  Nephrology  Progress Note    Patient Name: Juan Carlos Yoo Sr.  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 Recent admission 10/4 at [facility] for decompensated hepatic cirrhosis where he was discharged with oral diuretic regimen including furosemide 60 mg BID and metolazone 2.5 mg daily, low salt diet with fluid restriction. It is unclear if patient is adherent to these medications or lifestyle modifications. W/u notable for anemia with hb 6.7 s/p 1 unit pRBC 11/20 and JAE on CKD w elevated BUN 49/creatinine 5.9, hyperkalemia (K 6.1>5.1 after shifting), bicarb 18, elevated lactate up to 3.5. Nephrology consulted for JAE with c/o HRS    Interval History: LIANG YING, sitting in chair this AM, reported slight improvement.    Review of patient's allergies indicates:  No Known Allergies  Current Facility-Administered Medications   Medication Frequency    albuterol-ipratropium 2.5 mg-0.5 mg/3 mL nebulizer solution 3 mL Q6H PRN    aluminum-magnesium hydroxide-simethicone 200-200-20 mg/5 mL suspension 30 mL QID PRN    dextrose 10% bolus 125 mL 125 mL PRN    dextrose 10% bolus 250 mL 250 mL PRN    famotidine tablet 20 mg Every other day    glucagon (human recombinant) injection 1 mg PRN    glucose chewable tablet 16 g PRN    glucose chewable tablet 24 g PRN    hydrocortisone tablet 10 mg Q24H    Followed by    [START ON 12/24/2024] hydrocortisone tablet 5 mg Q24H    hydrocortisone tablet 5 mg  Q24H    lactulose 20 gram/30 mL solution Soln 30 g TID    melatonin tablet 6 mg Nightly PRN    metoprolol tartrate (LOPRESSOR) split tablet 12.5 mg BID    miconazole nitrate 2% ointment BID    midodrine tablet 15 mg PRN    midodrine tablet 20 mg Q8H    midodrine tablet 20 mg Daily PRN    naloxone 0.4 mg/mL injection 0.02 mg PRN    ondansetron injection 4 mg Q8H PRN    polyethylene glycol packet 17 g BID PRN    potassium chloride CR capsule 30 mEq Once    simethicone chewable tablet 80 mg QID PRN    sodium chloride 0.9% flush 10 mL PRN    tamsulosin 24 hr capsule 0.8 mg QHS       Objective:     Vital Signs (Most Recent):  Temp: 98.4 °F (36.9 °C) (12/18/24 1200)  Pulse: 88 (12/18/24 1547)  Resp: 18 (12/18/24 1200)  BP: (!) 110/59 (12/18/24 1545)  SpO2: 100 % (12/18/24 1545) Vital Signs (24h Range):  Temp:  [97.6 °F (36.4 °C)-98.4 °F (36.9 °C)] 98.4 °F (36.9 °C)  Pulse:  [72-91] 88  Resp:  [18] 18  SpO2:  [99 %-100 %] 100 %  BP: ()/(50-61) 110/59     Weight: (!) 137.8 kg (303 lb 12.7 oz) (12/13/24 0400)  Body mass index is 40.08 kg/m².  Body surface area is 2.66 meters squared.    I/O last 3 completed shifts:  In: 2115 [P.O.:1865; Other:250]  Out: 3200 [Urine:200; Other:3000]     Physical Exam  Constitutional:       Appearance: He is obese.   Pulmonary:      Effort: Pulmonary effort is normal. No respiratory distress.   Musculoskeletal:         General: Swelling present.      Right lower leg: Edema present.      Left lower leg: Edema present.   Neurological:      General: No focal deficit present.      Mental Status: He is alert and oriented to person, place, and time.          Significant Labs:  BMP:   Recent Labs   Lab 12/18/24  0524   *   *   K 3.2*      CO2 19*   BUN 20   CREATININE 3.5*   CALCIUM 8.4*   MG 2.1     CBC:   Recent Labs   Lab 12/18/24  0524   WBC 6.50   RBC 3.14*   HGB 8.4*   HCT 25.8*   PLT 60*   MCV 82   MCH 26.8*   MCHC 32.6        Significant Imaging:  Labs:  Reviewed  Assessment/Plan:     Renal/  JAE (acute kidney injury)  JAE is likely due to pre-renal azotemia due to intravascular volume depletion secondary to decompensated hepatic cirrhosis with volume overload and acute tubular necrosis caused by hemodynamic instability, renal hypoperfusion, severe sepsis with GNR bacteremia. Initial presentation concerning for hepatorenal syndrome, transferred to MICU for vasopressors without success in reaching MAP goal 85-90 for treatment of HRS. Currently, patient with oligouric JAE more contributed by ATN as above.     Recommendations:  - HD session done today, next session on Friday.  - Strict I/Os  -Avoid hypotensive episodes            Thank you for your consult. I will follow-up with patient. Please contact us if you have any additional questions.    Betty Terrazas MD  Nephrology  [facility]

## 2024-12-18 NOTE — PLAN OF CARE
Pt AAOx4. Afebrile. VSS. No report of pain. 2 BM overnight. No report of a fall or injury. Bed in the lowest setting, call light within reach.

## 2024-12-18 NOTE — PROGRESS NOTES
Steffen Greene - Transplant [facility] Medicine  Progress Note    Patient Name: Juan Carlos Yoo Sr.  MRN: [id number]  Patient Class: IP- Inpatient   Admission Date: 11/20/2024  Length of Stay: 28 days  Attending Physician: Giovani Wheat MD  Primary Care Provider: Nyla Rios FNP        Subjective     Principal Problem:Decompensated cirrhosis        HPI:  69M with PMH of alcoholic cirrhosis, esophageal varices, Afib on eliquis, and HTN who presents for c/o worsening diffuse swelling as well as R arm pain/erythema. He was recently hospitalized 1 month ago at [facility] for volume overload in the setting of decompensated cirrhosis. He was treated with IV diuresis and discharged with adjustment to his diuretics, currently on lasix 60mg BID and metolazone 2.5mg every other week as per family. Patient reports diffuse swelling of his upper and lower extremities in addition to distended abdomen and worsening dyspnea, which he believes is due to recent medication adjustment. Family states he is non-compliant with low sodium diet and fluid restriction. Family states he has been compliant with diuretics, but rom't taken eliquis for 3 days due to issue getting refill. He also reports scratching right arm on door frame 3-4 days ago which has become red and painful after wrapping in in bandage. He claims to be compliant with medications, but is a poor historian. He follows with hepatology, not currently active on transplant list. He states he hasn't consumed alcohol for at least 9 months. Has c/o chills, but denies fever, cough, nausea, vomiting, chest pain, dysuria, hematuria, blood in stool. Workup in ED remarkable for VS: T 97.6 HR 94 RR 22 BP 95/56 O2 sat 97% on RA. Hb 6.7, MCV 75, Plt 81, PT/INR 22.6/2.2, Na 128, K 6.1, BUN/Cr 49/5.7, Alb 2.8, AST/ALT 35/9, Ammonia 104, , Trop neg, LA 2.9, CXR: Cardiac size is enlarged similar to prior.  No large volume of pleural fluid noted although there is mild blunting of  the right costophrenic angle which may relate to a small amount of pleural fluid.  Suspected right basilar opacity, to be correlated clinically for infection. RUE venous doppler: No thrombus in central veins of the right upper extremity. Patient received vanc/zosyn and lasix 80mg IVP in ED and will be admitted to hospital medicine service for further management.    Overview/Hospital Course:  69 y.o. male with history of EtOH use disorder, EtOH cirrhosis, HCC s/p y90, morbid obesity BMI 44, HTN, and Afib who presents with severe anasarca and JAE.      Appreciate hepatology and nephrology recommendations.  Diuretics were held because of concern for HRS.  Patient was started on albumin and midodrine per Nephrology recommendations for HRS.  Renal function continued to worsen and recommendation per Nephrology to transfer to ICU for maintaining goal map over 85 on Levophed.  ICU was notified and patient to be assessed to be transferred to ICU.     Patient also has Gram-positive cocci and Gram-negative rods in his blood now.  Possible sources could be got translocation and barrier related infection through skin as he has been significantly edematous and has been oozing.  Has been on vancomycin and Rocephin.  Id was consulted this morning.  Repeat blood cultures were obtained.     Continues to have anemia.  Hemoglobin dropped on 11/20 and was given 1 unit of packed red blood cells.  Did not respond appropriately.  Hemoglobin dropped again to 6.8 on 11/21 and was given 1 unit of packed red blood cells.  Hemoglobin again on 11/22 is 7.2.  No reported melena or hematochezia.  Patient was on Eliquis for atrial fibrillation prior to admission which was held.  Given history of esophageal varices and portal hypertensive gastropathy whereas also could be component of slow bleeding, under production due to CKD and hemolysis while also with decompensated cirrhosis.  Started on Protonix IV b.i.d. and octreotide.     Also clarified  with hepatology.  Given patient's current infection we will await ID recommendations however we will be challenging to consider transplant evaluation at this time.  Trend clinical course     Goal clarification with the patient and family.  Patient at this time wants to be full code and continue with Levophed in ICU.  They would consider discussion with palliative care in a day or so if things do not get better.      In MICU patient started on Levophed, however titration remained difficult given tachycardic response from the patient.      11/24 Trialysis and arterial lines placed overnight and currently on levo and norepi. SLED today.     11/25 Boo dc. Increased midodrine. Continue steroids until d/c pressors. Discussed with Nephrology about our concern for sustained use of pressors to achieve their MAP goals of 85. Will follow up tomorrow.      11/26 Platelets 48. Repeat 38 confirming thrombocytopenia. Concern for HIT. D/c heparin. Increased lactulose dosing. Bladder scan revealed urine in bladder. Boo placed. Off vaso. Continuing levo. Maintain MAP goals 80. PT/OT consulted.      11/27 MAP goal 75-80. Heparin antibody result pending.      11/28 Pt with abdominal pain, decreased bowel movements, emesis. Lactic acid 2.9 and repeat 2.7. Less concerned for mesenteric ischemia and more of a concern was for SBO. NG tube placed with subsequent suction of 500mL fluid. Abdomen less distended after placement and pt subsequently had bowel movement. MAP Goal of 60 with plan to come off pressors entirely and switch to dialysis after permacath, as he is not  liver transplant eligible at this time.      11/29 Pt with ileus. Clear liquids for now. Platelets 24. HIT versus zosyn induced? Peripheral smear sent. Zosyn changed to kaylah. Consent and transfuse 1U. GOC conversation today. Pt opting for long term dialysis.      11/30 naeon. Started back on heparin. One time dose of 120mg lasix today. Hold off SLED/SCUF today and give  IV lasix 120 mg once; plan for SLED/SCUF tomorrow      12/1 Patient is becoming more dependent on dialysis. Not a current liver transplant candidate. Still complaining of abdominal pain after discontinuing the NGT. AXR with evidence of dilated bowel loops. Brown bomb administered. The days following administration of the brown bomb, patient had more frequent bowel movements and was able to pass flatulence. Constipation eventually resolved.   Given the need for pressor support, he was worked up for adrenal insufficiency which was positive. Consequently, he was started on steroids for adrenal insufficiency. His blood pressures improved, however, he still required pressor support, particularly during dialysis. Pressor support was eventually weaned off.   Patient was started on midodrine to help with BP. Nephrology trialed intermittent HD and patient seemed to tolerate it well. Nephrology recommended placement of tunneled catheter for HD, interventional nephrology was consulted. Patient was medically stable for step down to the hospital medicine floors.         Interval History/Significant Events: NAEON. Planning for HD today. No acute issues. Discharge planning. CM assisting with finding HD chair and discharge planning to SNF.     Review of Systems  Objective:     Vital Signs (Most Recent):  Temp: 98.4 °F (36.9 °C) (12/18/24 1200)  Pulse: 86 (12/18/24 1200)  Resp: 18 (12/18/24 1200)  BP: (!) 115/56 (12/18/24 1200)  SpO2: 100 % (12/18/24 1200) Vital Signs (24h Range):  Temp:  [97.5 °F (36.4 °C)-98.4 °F (36.9 °C)] 98.4 °F (36.9 °C)  Pulse:  [72-87] 86  Resp:  [18] 18  SpO2:  [99 %-100 %] 100 %  BP: ()/(54-61) 115/56   Weight: (!) 137.8 kg (303 lb 12.7 oz)  Body mass index is 40.08 kg/m².      Intake/Output Summary (Last 24 hours) at 12/18/2024 1418  Last data filed at 12/18/2024 1157  Gross per 24 hour   Intake 1745 ml   Output --   Net 1745 ml          Physical Exam  Vitals and nursing note reviewed.    Constitutional:       General: He is awake. He is not in acute distress.     Appearance: He is obese. He is ill-appearing. He is not toxic-appearing.   HENT:      Head: Normocephalic and atraumatic.   Eyes:      Extraocular Movements: Extraocular movements intact.      Conjunctiva/sclera: Conjunctivae normal.   Cardiovascular:      Rate and Rhythm: Normal rate.   Pulmonary:      Effort: Pulmonary effort is normal. No respiratory distress.   Abdominal:      General: There is distension.      Palpations: Abdomen is soft.      Tenderness: There is no abdominal tenderness. There is no guarding or rebound.   Musculoskeletal:         General: No swelling or tenderness.      Right lower leg: Edema present.      Left lower leg: Edema present.      Comments: 2+ pitting edema in bilateral lower extremities   Skin:     General: Skin is warm.      Coloration: Skin is not jaundiced.      Findings: Bruising present.   Neurological:      Mental Status: He is alert and oriented to person, place, and time.      Motor: No weakness.            Vents:     Lines/Drains/Airways       Central Venous Catheter Line  Duration                  Hemodialysis Catheter 12/16/24 1152 right internal jugular 2 days              Peripheral Intravenous Line  Duration                  Peripheral IV - Single Lumen 20 G Anterior;Distal;Left Forearm -- days                  Significant Labs:    CBC/Anemia Profile:  Recent Labs   Lab 12/17/24  0641 12/18/24  0524   WBC 7.53 6.50   HGB 9.0* 8.4*   HCT 26.1* 25.8*   PLT 57* 60*   MCV 83 82   RDW 26.4* 26.8*        Chemistries:  Recent Labs   Lab 12/17/24  0641 12/18/24  0524   * 128*   K 3.7 3.2*    100   CO2 19* 19*   BUN 15 20   CREATININE 2.6* 3.5*   CALCIUM 8.0* 8.4*   ALBUMIN 2.2* 2.2*   PROT 5.7* 5.9*   BILITOT 3.7* 3.2*   ALKPHOS 86 97   ALT 20 21   AST 38 35   MG 2.0 2.1   PHOS 2.9 3.8       All pertinent labs within the past 24 hours have been reviewed.    Significant Imaging:  I have  reviewed all pertinent imaging results/findings within the past 24 hours.    Assessment and Plan     * Decompensated cirrhosis  Volume Overload  Etoh Cirrhosis  Hx of HCC s/p Y90    MELD 3.0: 32 at 12/18/2024  5:24 AM  MELD-Na: 32 at 12/18/2024  5:24 AM  Calculated from:  Serum Creatinine: On dialysis. Using the maximum value.  Serum Sodium: 128 mmol/L at 12/18/2024  5:24 AM  Total Bilirubin: 3.2 mg/dL at 12/18/2024  5:24 AM  Serum Albumin: 2.2 g/dL at 12/18/2024  5:24 AM  INR(ratio): 1.6 at 12/18/2024  5:24 AM  Age at listing (hypothetical): 69 years  Sex: Male at 12/18/2024  5:24 AM    Cont lactulose  Daily CMP  Per hepatology, pt is not a transplant candidate at this time  Discharge planning to SNF    Adrenal insufficiency  On going problem since admission, most likely multifactorial - component of liver dysfunction, HRS, sepsis on admission.  Continues to require levo, midodrine has been up titrated.   Random cortisol was 6   Persistent hypotension thought to be adrenal insufficiency.   Patient underwent testing with cosyntropin stimulation test, results are not compatible with adrenal insufficiency  Cont hydrocortisone taper      Moderate protein-calorie malnutrition  Nutrition consulted. Most recent weight and BMI monitored-     Measurements:  Wt Readings from Last 1 Encounters:   12/13/24 (!) 137.8 kg (303 lb 12.7 oz)   Body mass index is 40.08 kg/m².    Patient has been screened and assessed by RD.    Malnutrition Type:  Context: acute illness or injury  Level: moderate    Malnutrition Characteristic Summary:  Energy Intake (Malnutrition): less than 75% for greater than 7 days  Subcutaneous Fat (Malnutrition): mild depletion  Muscle Mass (Malnutrition): mild depletion  Fluid Accumulation (Malnutrition): moderate to severe    Interventions/Recommendations (treatment strategy):  1.)      Hypotension        Edema  Volume mgmt with RRT      Debility  Patient with Acute on chronic debility due to chronic  unspecified fatigue, age-related physical debility, and other reduced mobility. The patient's latest AMPAC (Activity Measure for Post Acute Care) Score is listed below.    AM-PAC Score - How much help does the patient need for each activity listed  Basic Mobility Total Score: 17  Turning over in bed (including adjusting bedclothes, sheets and blankets)?: A little  Sitting down on and standing up from a chair with arms (e.g., wheelchair, bedside commode, etc.): A little  Moving from lying on back to sitting on the side of the bed?: A little  Moving to and from a bed to a chair (including a wheelchair)?: A little  Need to walk in hospital room?: A little  Climbing 3-5 steps with a railing?: A lot    Plan  - Progressive mobility protocol initated  - PT/OT consulted  - Fall precautions in place  - PT/OT recommending LI therapy. Planning for SNF      Advanced care planning/counseling discussion        Palliative care encounter  Goals of care, counseling/discussion  Advance Care Planning  Patient requests FULL CODE status and would like to continue current treatment      Abdominal pain        Gram-negative bacteremia  Blood cultures from admission with B. Diminuta, micrococcus luteus, lysinobacillus species. Seen by ID previously who recommended stopping antibiotics due to concern these were contaminants. Repeat blood cultures have been no growth. Has since been transferred to ICU with increased pressor requirement. Blood cultures repeated on 11/24 are no growth x 24 hours.   11/29 Switched from zosyn to meropenem due to concern of thrombocytopenia.   Finished meropenem course 12/8 @ 6 PM    Encephalopathy, metabolic  2/2 decompensated cirrhosis and JAE  Resolved    Hyponatremia  Hyponatremia is likely due to Cirrhosis. The patient's most recent sodium results are listed below.  Recent Labs     12/16/24  0604 12/17/24  0641 12/18/24  0524   * 132* 128*       Plan  - Correct the sodium by 4-6mEq in 24 hours.   -  Appreciate nephrology recommendations  - Monitor sodium daily  - Patient hyponatremia is stable  - Mgmt with HD    Thrombocytopenia  The likely etiology of thrombocytopenia is liver disease. The patients 3 most recent labs are listed below.  Recent Labs     12/16/24  0604 12/17/24  0641 12/18/24  0524   PLT 57* 57* 60*       Plan  - Will transfuse if platelet count is <50k (if undergoing surgical procedure or have active bleeding).      Microcytic anemia  Anemia is likely due to chronic disease due to Chronic liver disease. Most recent hemoglobin and hematocrit are listed below.  Recent Labs     12/16/24  0604 12/17/24  0641 12/18/24  0524   HGB 7.4* 9.0* 8.4*   HCT 22.1* 26.1* 25.8*       Plan  - Monitor serial CBC: Daily  - Transfuse PRBC if patient becomes hemodynamically unstable, symptomatic or H/H drops below 7/21.  - Patient has received 1 units of PRBCs on 11/20, 11/21, 12/15  - Patient's anemia is currently stable  - Hb baseline 7-8. No obvious bleeding  - Eliquis held on admission.  DVT prophylaxis held.    Stage 3a chronic kidney disease  ESRD on HD  Nephrology following  Will try to remove more fluid  Strict intake and output  Renally dose all medications  Avoid nephrotoxic agents  IR placed RIJ TDC 12/16  CM assisting with finding HD chair      Severe sepsis  This patient does have evidence of infective focus  My overall impression is sepsis.  Source: Abdominal     Organ dysfunction indicated by Acute kidney injury     Fluid challenge Contraindicated- Fluid bolus is contraindicated in this patient due to End Stage Liver Disease      Post- resuscitation assessment No - Post resuscitation assessment not needed      Source control achieved by: antibiotics  Patient finished course of abx (meropenem) 12/8      JAE (acute kidney injury)  Baseline creatinine is  1.5 . Most recent creatinine and eGFR are listed below.    Recent Labs     12/16/24  0604 12/17/24  0641 12/18/24  0524   CREATININE 4.3* 2.6* 3.5*    EGFRNORACEVR 14.0* 25.6* 17.9*        Plan  - JAE is worsening. Will continue current treatment  - Avoid nephrotoxins and renally dose meds for GFR listed above  - Monitor urine output, serial BMP, and adjust therapy as needed    - Etiology includes volume overload, obstruction, hypotension, possible HRS  Baseline creatinine is 1.5. May require dialysis long term.   Patient tolerated intermittent HD well (12/12)  Avoid nephrotoxins and renally dose meds for GFR listed above  Monitor urine output, serial BMP, and adjust therapy as needed  IR placed RIJ TDC 12/16. Working to get HD chair.    Atrial fibrillation  Patient has persistent (7 days or more) atrial fibrillation. Patient is currently in atrial fibrillation. [id number] Score: 1. The patients heart rate in the last 24 hours is as follows:  Pulse  Min: 72  Max: 87       Plan  - Patient's afib is currently controlled  Holding home Eliquis in the setting of recent low blood counts  Continue metoprolol 12.5 mg BID     Coagulopathy  2/2 cirrhosis. See plan below    Recent Labs   Lab 12/18/24  0524   INR 1.6*     Received 3 doses of IV vitamin K.  End Stage Liver Disease precipitated coagulopathy  Heparin d/c d/t thrombocytopenia  CTM        Goals of care, counseling/discussion  Advance Care Planning    Code Status  In light of the patients advanced and life limiting illness,I engaged the the patient and family in a voluntary conversation about the patient's preferences for care  at the very end of life. The patient wishes to have a natural, peaceful death.  Along those lines, the patient wishes to have CPR or other invasive treatments performed when his heart and/or breathing stops. I communicated to the patient and family. Continue FULL CODE.    A total of 15 min was spent on advance care planning, goals of care discussion, emotional support, formulating and communicating prognosis and exploring burden/benefit of various approaches of treatment. This discussion  occurred on a fully voluntary basis with the verbal consent of the patient and/or family.           VTE Risk Mitigation (From admission, onward)           Ordered     Place sequential compression device  Until discontinued         11/22/24 1329     IP VTE HIGH RISK PATIENT  Once         11/20/24 1622                    Discharge Planning   AUTUMN: 12/20/2024     Code Status: Full Code   Medical Readiness for Discharge Date:   Discharge Plan A: Skilled Nursing Facility   Discharge Delays: None known at this time            Please place Justification for DME        Giovani Wheat MD  Department of Hospital Medicine   [facility] - Transplant Stepdown

## 2024-12-18 NOTE — PT/OT/SLP PROGRESS
Occupational Therapy   Treatment    Name: Juan Carlos Yoo Sr.  MRN: [id number]  Admitting Diagnosis:  Decompensated cirrhosis       Recommendations:     Discharge Recommendations: Moderate Intensity Therapy  Discharge Equipment Recommendations:  walker, rolling, wheelchair, bedside commode, grab bar, bath bench  Barriers to discharge:  Decreased caregiver support    Assessment:     Juan Carlos Yoo Sr. is a 69 y.o. male with a medical diagnosis of Decompensated cirrhosis.  He presents with the fallowing performance deficits affecting function are weakness, impaired endurance, impaired self care skills, impaired functional mobility, gait instability, impaired balance, pain, decreased safety awareness, decreased lower extremity function, decreased upper extremity function, impaired cardiopulmonary response to activity, edema, impaired skin.   Patient agreeable to tx session, patient is demonstrating progress with functional transfers, bed mobility, and self-care task, however; patient continues to have limited activity tolerance due to pain and weakness from recent hospitalization. Patient would benefit from Moderate intensity therapy intervention to address over all functional decline with mobility task, endurance, and ADLs in order to return to PLOF.     Rehab Prognosis:  Good; patient would benefit from acute skilled OT services to address these deficits and reach maximum level of function.       Plan:     Patient to be seen 4 x/week to address the above listed problems via self-care/home management, therapeutic activities, therapeutic exercises, neuromuscular re-education  Plan of Care Expires: 12/27/24  Plan of Care Reviewed with: patient    Subjective     Chief Complaint:tired  Patient/Family Comments/goals: to return to PLOF  Pain/Comfort:  Pain Rating 1: 0/10    Objective:     Communicated with: Nurse prior to session.  Patient found up in chair with Trialysis, telemetry upon OT entry to room.  A client care conference  was completed by the OTR and the ARVIZU prior to treatment by the ARVIZU to discuss the patient's POC and current status.   General Precautions: Standard, fall    Orthopedic Precautions:N/A  Braces: N/A  Respiratory Status: Room air     Occupational Performance:     Bed Mobility:    Patient completed Scooting/Bridging to HOB with Min A with bed on trendelenburg position and patient utilizing bed rails to pull up   Patient completed Sit to Supine with moderate assistance for leg mgmt     Functional Mobility/Transfers:  Patient completed Sit <> Stand Transfer with minimal assistance  with  rolling walker   Functional Mobility: patient ambulated from bedside chair to sink to complete a grooming task, (16ft) and ambulated back to bed with CGA ,without seated rest break.    Activities of Daily Living:  Grooming: Setup A to complete oral care hygiene task in standing, patient was able to stand for 5 mins with SBA for standing balance   Upper Body Dressing: moderate assistance to janiya/doff gown  Toileting: maximal assistance for pericare hygiene task     AMPA 6 Click ADL: 17    Treatment & Education:  Discussed OT POC and progress  Educated patient on the importance to continue to perform exercises in order to reduce stiffness and promote joint mobility and blood flow  Addressed patient's questions and concerns within ARVIZU scope of practice      Patient left HOB elevated with all lines intact, call button in reach, and nurse notified    GOALS:   Multidisciplinary Problems       Occupational Therapy Goals          Problem: Occupational Therapy    Goal Priority Disciplines Outcome Interventions   Occupational Therapy Goal     OT, PT/OT Progressing    Description: Goals to be met by: 12/25/24     Patient will increase functional independence with ADLs by performing:    UE Dressing with Set-up Assistance.  LE Dressing with Stand-by Assistance.  Grooming while standing at sink with Stand-by Assistance. Met 12/17  Revised to  (S).  Toileting from toilet with Stand-by Assistance for hygiene and clothing management.   Step transfer: with SBA and use of LRAD  Supine to sit with SBA. Met 12/17  Revised to (I).  Toilet transfer to toilet with SBA and use of LRAD.  Independent with HEP to BUE to improve ROM                         Time Tracking:     OT Date of Treatment: 12/18/24  OT Start Time: 1117  OT Stop Time: 1201  OT Total Time (min): 44 min    Billable Minutes:Self Care/Home Management 24  Therapeutic Activity 20    OT/EDWARDO: EDWARDO     Number of EDWARDO visits since last OT visit: 1    12/18/2024

## 2024-12-18 NOTE — ASSESSMENT & PLAN NOTE
Patient has persistent (7 days or more) atrial fibrillation. Patient is currently in atrial fibrillation. [id number] Score: 1. The patients heart rate in the last 24 hours is as follows:  Pulse  Min: 72  Max: 87       Plan  - Patient's afib is currently controlled  Holding home Eliquis in the setting of recent low blood counts  Continue metoprolol 12.5 mg BID

## 2024-12-18 NOTE — ASSESSMENT & PLAN NOTE
JAE is likely due to pre-renal azotemia due to intravascular volume depletion secondary to decompensated hepatic cirrhosis with volume overload and acute tubular necrosis caused by hemodynamic instability, renal hypoperfusion, severe sepsis with GNR bacteremia. Initial presentation concerning for hepatorenal syndrome, transferred to MICU for vasopressors without success in reaching MAP goal 85-90 for treatment of HRS. Currently, patient with oligouric JAE more contributed by ATN as above.     Recommendations:  - Planing for HD session tonorrow, orders placed, will keep on MWF schedule  - Will do dialysis with low temperature, high calcium bath, higher duration of 3 hours, ultrafiltration modelling, PRN midodrine, all measures to prevent intra dialytic hypotension and effectively removes fluid.

## 2024-12-18 NOTE — ASSESSMENT & PLAN NOTE
Anemia is likely due to chronic disease due to Chronic liver disease. Most recent hemoglobin and hematocrit are listed below.  Recent Labs     12/16/24  0604 12/17/24  0641 12/18/24  0524   HGB 7.4* 9.0* 8.4*   HCT 22.1* 26.1* 25.8*       Plan  - Monitor serial CBC: Daily  - Transfuse PRBC if patient becomes hemodynamically unstable, symptomatic or H/H drops below 7/21.  - Patient has received 1 units of PRBCs on 11/20, 11/21, 12/15  - Patient's anemia is currently stable  - Hb baseline 7-8. No obvious bleeding  - Eliquis held on admission.  DVT prophylaxis held.

## 2024-12-18 NOTE — NURSING
3  hours  dialysis treatment completed . 1.5L UF removed . Both lumens of HD catheter flushed , heparin locked  and wrapped  with tape and guaze .   Report  given to Primary Nurse . Waiting on transport

## 2024-12-18 NOTE — PT/OT/SLP PROGRESS
Physical Therapy Treatment    Patient Name:  Juan Carlos Yoo Sr.   MRN:  [id number]    Recommendations:     Discharge Recommendations: Moderate Intensity Therapy  Discharge Equipment Recommendations: walker, rolling, wheelchair, bedside commode, grab bar, bath bench  Barriers to discharge: None    Assessment:     Juan Carlos Yoo Sr. is a 69 y.o. male admitted with a medical diagnosis of Decompensated cirrhosis.  He presents with the following impairments/functional limitations: weakness, impaired endurance, impaired functional mobility, decreased safety awareness, impaired cardiopulmonary response to activity, decreased upper extremity function, decreased lower extremity function Pt tolerated treatment session well today. Pt requiring little to no assistance for bed mobility, transfers and gait training. Pt was able to increase distance ambulated during today's session. Patient remains appropriate for continued skilled services within the acute environment and goals remain appropriate.   .    Rehab Prognosis: Good; patient would benefit from acute skilled PT services to address these deficits and reach maximum level of function.    Recent Surgery: * No surgery found *      Plan:     During this hospitalization, patient to be seen 4 x/week to address the identified rehab impairments via gait training, therapeutic activities, therapeutic exercises, neuromuscular re-education and progress toward the following goals:    Plan of Care Expires:  12/27/24    Subjective     Chief Complaint: None stated  Patient/Family Comments/goals:  Pt Agreeable to PT   Pain/Comfort:  Pain Rating 1: 0/10      Objective:     Communicated with Rn prior to session.  Patient found supine with telemetry, Trialysis upon PT entry to room.     General Precautions: Standard, fall  Orthopedic Precautions: N/A  Braces: N/A  Respiratory Status: Room air     Functional Mobility:  Bed Mobility:     Supine to Sit: contact guard assistance    Transfers:     Sit to  Stand x 2:  CGA from bed + [place] from toilet  with rolling walker  Gait: 15 ft + 15 ft + 140 ft CGA with RW   Pt ambulated with decreased osmar and step length     Pt performed 10 repetitions of seated B LE exercises consisting of: Marching, LAQ, ABD/ADD, heel raises, and toe raises.         AM-PAC 6 CLICK MOBILITY  Turning over in bed (including adjusting bedclothes, sheets and blankets)?: 3  Sitting down on and standing up from a chair with arms (e.g., wheelchair, bedside commode, etc.): 3  Moving from lying on back to sitting on the side of the bed?: 3  Moving to and from a bed to a chair (including a wheelchair)?: 3  Need to walk in hospital room?: 3  Climbing 3-5 steps with a railing?: 2  Basic Mobility Total Score: 17       Treatment & Education:  Therapist provided instruction and educated for safety during transfers and gait training. As well as proper body mechanics, energy conservation, and fall prevention strategies during tasks listed above, and the effects of prolonged immobility and the importance of performing EOB/OOB activity and exercises to promote healing and reduce recovery time.       Patient left up in chair with all lines intact, call button in reach, Rn notified, and Rn present..    GOALS:   Multidisciplinary Problems       Physical Therapy Goals          Problem: Physical Therapy    Goal Priority Disciplines Outcome Interventions   Physical Therapy Goal     PT, PT/OT Progressing    Description: Goals to be met by: 24     Patient will increase functional independence with mobility by performin. Supine to sit with Minimal Assistance  2. Sit to stand transfer with Stand By Assistance  3. Bed to chair transfer with Stand By Assistance using LRAD  4. Gait  x 150 feet with Stand By Assistance using No LRAD.   5. Pt sit on EOB x 15 min with SBA and perform functional activities to increased functional mobility.                          Time Tracking:     PT Received On: 24  PT  Start Time: 0819     PT Stop Time: 0843  PT Total Time (min): 24 min     Billable Minutes: Gait Training 15 and Therapeutic Exercise 9    Treatment Type: Treatment  PT/PTA: PTA     Number of PTA visits since last PT visit: 2     12/18/2024

## 2024-12-18 NOTE — ASSESSMENT & PLAN NOTE
Baseline creatinine is  1.5 . Most recent creatinine and eGFR are listed below.    Recent Labs     12/16/24  0604 12/17/24  0641 12/18/24  0524   CREATININE 4.3* 2.6* 3.5*   EGFRNORACEVR 14.0* 25.6* 17.9*        Plan  - JAE is worsening. Will continue current treatment  - Avoid nephrotoxins and renally dose meds for GFR listed above  - Monitor urine output, serial BMP, and adjust therapy as needed    - Etiology includes volume overload, obstruction, hypotension, possible HRS  Baseline creatinine is 1.5. May require dialysis long term.   Patient tolerated intermittent HD well (12/12)  Avoid nephrotoxins and renally dose meds for GFR listed above  Monitor urine output, serial BMP, and adjust therapy as needed  IR placed RIJ TDC 12/16. Working to get HD chair.

## 2024-12-19 PROBLEM — E87.6 HYPOKALEMIA: Status: ACTIVE | Noted: 2024-12-19

## 2024-12-19 PROBLEM — E83.39 HYPOPHOSPHATEMIA: Status: ACTIVE | Noted: 2024-12-19

## 2024-12-19 PROBLEM — E87.5 HYPERKALEMIA: Status: ACTIVE | Noted: 2024-12-19

## 2024-12-19 PROBLEM — R65.20 SEVERE SEPSIS: Status: RESOLVED | Noted: 2024-11-20 | Resolved: 2024-12-19

## 2024-12-19 PROBLEM — E83.39 HYPOPHOSPHATEMIA: Status: RESOLVED | Noted: 2024-12-19 | Resolved: 2024-12-19

## 2024-12-19 PROBLEM — E87.5 HYPERKALEMIA: Status: RESOLVED | Noted: 2024-12-19 | Resolved: 2024-12-19

## 2024-12-19 PROBLEM — E66.01 MORBID OBESITY: Status: ACTIVE | Noted: 2024-12-19

## 2024-12-19 PROBLEM — E87.6 HYPOKALEMIA: Status: RESOLVED | Noted: 2024-12-19 | Resolved: 2024-12-19

## 2024-12-19 PROBLEM — A41.9 SEVERE SEPSIS: Status: RESOLVED | Noted: 2024-11-20 | Resolved: 2024-12-19

## 2024-12-19 LAB
ALBUMIN SERPL BCP-MCNC: 2.2 G/DL (ref 3.5–5.2)
ALP SERPL-CCNC: 91 U/L (ref 40–150)
ALT SERPL W/O P-5'-P-CCNC: 24 U/L (ref 10–44)
ANION GAP SERPL CALC-SCNC: 8 MMOL/L (ref 8–16)
AST SERPL-CCNC: 41 U/L (ref 10–40)
BACTERIA #/AREA URNS AUTO: ABNORMAL /HPF
BASOPHILS # BLD AUTO: 0.06 K/UL (ref 0–0.2)
BASOPHILS NFR BLD: 1 % (ref 0–1.9)
BILIRUB SERPL-MCNC: 3.3 MG/DL (ref 0.1–1)
BILIRUB UR QL STRIP: NEGATIVE
BUN SERPL-MCNC: 17 MG/DL (ref 8–23)
CALCIUM SERPL-MCNC: 8.5 MG/DL (ref 8.7–10.5)
CHLORIDE SERPL-SCNC: 102 MMOL/L (ref 95–110)
CLARITY UR REFRACT.AUTO: ABNORMAL
CO2 SERPL-SCNC: 22 MMOL/L (ref 23–29)
COLOR UR AUTO: YELLOW
CREAT SERPL-MCNC: 3.1 MG/DL (ref 0.5–1.4)
DIFFERENTIAL METHOD BLD: ABNORMAL
EOSINOPHIL # BLD AUTO: 0.2 K/UL (ref 0–0.5)
EOSINOPHIL NFR BLD: 2.6 % (ref 0–8)
ERYTHROCYTE [DISTWIDTH] IN BLOOD BY AUTOMATED COUNT: 27.1 % (ref 11.5–14.5)
EST. GFR  (NO RACE VARIABLE): 20.7 ML/MIN/1.73 M^2
GLUCOSE SERPL-MCNC: 100 MG/DL (ref 70–110)
GLUCOSE UR QL STRIP: NEGATIVE
HAV IGM SERPL QL IA: NORMAL
HBV CORE AB SERPL QL IA: NORMAL
HBV SURFACE AB SER-ACNC: 75.68 MIU/ML
HBV SURFACE AB SER-ACNC: REACTIVE M[IU]/ML
HBV SURFACE AG SERPL QL IA: NORMAL
HCT VFR BLD AUTO: 24.8 % (ref 40–54)
HGB BLD-MCNC: 8 G/DL (ref 14–18)
HGB UR QL STRIP: ABNORMAL
HYALINE CASTS UR QL AUTO: 0 /LPF
IMM GRANULOCYTES # BLD AUTO: 0.06 K/UL (ref 0–0.04)
IMM GRANULOCYTES NFR BLD AUTO: 1 % (ref 0–0.5)
INR PPP: 1.6 (ref 0.8–1.2)
KETONES UR QL STRIP: NEGATIVE
LEUKOCYTE ESTERASE UR QL STRIP: ABNORMAL
LYMPHOCYTES # BLD AUTO: 0.7 K/UL (ref 1–4.8)
LYMPHOCYTES NFR BLD: 10.9 % (ref 18–48)
MAGNESIUM SERPL-MCNC: 2 MG/DL (ref 1.6–2.6)
MCH RBC QN AUTO: 26.8 PG (ref 27–31)
MCHC RBC AUTO-ENTMCNC: 32.3 G/DL (ref 32–36)
MCV RBC AUTO: 83 FL (ref 82–98)
MICROSCOPIC COMMENT: ABNORMAL
MONOCYTES # BLD AUTO: 1.7 K/UL (ref 0.3–1)
MONOCYTES NFR BLD: 26.8 % (ref 4–15)
NEUTROPHILS # BLD AUTO: 3.6 K/UL (ref 1.8–7.7)
NEUTROPHILS NFR BLD: 57.7 % (ref 38–73)
NITRITE UR QL STRIP: NEGATIVE
NRBC BLD-RTO: 0 /100 WBC
PH UR STRIP: 6 [PH] (ref 5–8)
PHOSPHATE SERPL-MCNC: 3.4 MG/DL (ref 2.7–4.5)
PLATELET # BLD AUTO: 61 K/UL (ref 150–450)
PMV BLD AUTO: ABNORMAL FL (ref 9.2–12.9)
POTASSIUM SERPL-SCNC: 4.1 MMOL/L (ref 3.5–5.1)
PROT SERPL-MCNC: 5.5 G/DL (ref 6–8.4)
PROT UR QL STRIP: ABNORMAL
PROTHROMBIN TIME: 17 SEC (ref 9–12.5)
RBC # BLD AUTO: 2.98 M/UL (ref 4.6–6.2)
RBC #/AREA URNS AUTO: 11 /HPF (ref 0–4)
SODIUM SERPL-SCNC: 132 MMOL/L (ref 136–145)
SP GR UR STRIP: 1.01 (ref 1–1.03)
URN SPEC COLLECT METH UR: ABNORMAL
WBC # BLD AUTO: 6.15 K/UL (ref 3.9–12.7)
WBC #/AREA URNS AUTO: >100 /HPF (ref 0–5)
WBC CLUMPS UR QL AUTO: ABNORMAL
YEAST UR QL AUTO: ABNORMAL

## 2024-12-19 PROCEDURE — 87086 URINE CULTURE/COLONY COUNT: CPT | Performed by: STUDENT IN AN ORGANIZED HEALTH CARE EDUCATION/TRAINING PROGRAM

## 2024-12-19 PROCEDURE — 99900035 HC TECH TIME PER 15 MIN (STAT)

## 2024-12-19 PROCEDURE — 81001 URINALYSIS AUTO W/SCOPE: CPT | Performed by: STUDENT IN AN ORGANIZED HEALTH CARE EDUCATION/TRAINING PROGRAM

## 2024-12-19 PROCEDURE — 80053 COMPREHEN METABOLIC PANEL: CPT

## 2024-12-19 PROCEDURE — 87088 URINE BACTERIA CULTURE: CPT | Performed by: STUDENT IN AN ORGANIZED HEALTH CARE EDUCATION/TRAINING PROGRAM

## 2024-12-19 PROCEDURE — 25000003 PHARM REV CODE 250: Performed by: INTERNAL MEDICINE

## 2024-12-19 PROCEDURE — 20600001 HC STEP DOWN PRIVATE ROOM

## 2024-12-19 PROCEDURE — 94761 N-INVAS EAR/PLS OXIMETRY MLT: CPT

## 2024-12-19 PROCEDURE — 36415 COLL VENOUS BLD VENIPUNCTURE: CPT | Performed by: STUDENT IN AN ORGANIZED HEALTH CARE EDUCATION/TRAINING PROGRAM

## 2024-12-19 PROCEDURE — 84100 ASSAY OF PHOSPHORUS: CPT

## 2024-12-19 PROCEDURE — 86706 HEP B SURFACE ANTIBODY: CPT | Mod: 91 | Performed by: STUDENT IN AN ORGANIZED HEALTH CARE EDUCATION/TRAINING PROGRAM

## 2024-12-19 PROCEDURE — 85025 COMPLETE CBC W/AUTO DIFF WBC: CPT

## 2024-12-19 PROCEDURE — 86704 HEP B CORE ANTIBODY TOTAL: CPT | Performed by: STUDENT IN AN ORGANIZED HEALTH CARE EDUCATION/TRAINING PROGRAM

## 2024-12-19 PROCEDURE — 86709 HEPATITIS A IGM ANTIBODY: CPT | Performed by: STUDENT IN AN ORGANIZED HEALTH CARE EDUCATION/TRAINING PROGRAM

## 2024-12-19 PROCEDURE — 85610 PROTHROMBIN TIME: CPT | Performed by: INTERNAL MEDICINE

## 2024-12-19 PROCEDURE — 25000003 PHARM REV CODE 250

## 2024-12-19 PROCEDURE — 86580 TB INTRADERMAL TEST: CPT | Performed by: STUDENT IN AN ORGANIZED HEALTH CARE EDUCATION/TRAINING PROGRAM

## 2024-12-19 PROCEDURE — 87106 FUNGI IDENTIFICATION YEAST: CPT | Performed by: STUDENT IN AN ORGANIZED HEALTH CARE EDUCATION/TRAINING PROGRAM

## 2024-12-19 PROCEDURE — 97530 THERAPEUTIC ACTIVITIES: CPT

## 2024-12-19 PROCEDURE — 87340 HEPATITIS B SURFACE AG IA: CPT | Performed by: STUDENT IN AN ORGANIZED HEALTH CARE EDUCATION/TRAINING PROGRAM

## 2024-12-19 PROCEDURE — 83735 ASSAY OF MAGNESIUM: CPT | Performed by: INTERNAL MEDICINE

## 2024-12-19 PROCEDURE — 51701 INSERT BLADDER CATHETER: CPT

## 2024-12-19 PROCEDURE — 30200315 PPD INTRADERMAL TEST REV CODE 302: Performed by: STUDENT IN AN ORGANIZED HEALTH CARE EDUCATION/TRAINING PROGRAM

## 2024-12-19 RX ORDER — FAMOTIDINE 20 MG/1
20 TABLET, FILM COATED ORAL DAILY
Status: DISCONTINUED | OUTPATIENT
Start: 2024-12-20 | End: 2024-12-20

## 2024-12-19 RX ADMIN — MIDODRINE HYDROCHLORIDE 20 MG: 5 TABLET ORAL at 08:12

## 2024-12-19 RX ADMIN — MIDODRINE HYDROCHLORIDE 20 MG: 5 TABLET ORAL at 06:12

## 2024-12-19 RX ADMIN — MICONAZOLE NITRATE: 20 OINTMENT TOPICAL at 08:12

## 2024-12-19 RX ADMIN — LACTULOSE 30 G: 20 SOLUTION ORAL at 02:12

## 2024-12-19 RX ADMIN — MICONAZOLE NITRATE: 20 OINTMENT TOPICAL at 09:12

## 2024-12-19 RX ADMIN — HYDROCORTISONE 10 MG: 5 TABLET ORAL at 06:12

## 2024-12-19 RX ADMIN — MIDODRINE HYDROCHLORIDE 20 MG: 5 TABLET ORAL at 02:12

## 2024-12-19 RX ADMIN — TAMSULOSIN HYDROCHLORIDE 0.8 MG: 0.4 CAPSULE ORAL at 08:12

## 2024-12-19 RX ADMIN — Medication 6 MG: at 08:12

## 2024-12-19 RX ADMIN — METOPROLOL TARTRATE 12.5 MG: 25 TABLET, FILM COATED ORAL at 08:12

## 2024-12-19 RX ADMIN — HYDROCORTISONE 5 MG: 5 TABLET ORAL at 02:12

## 2024-12-19 RX ADMIN — LACTULOSE 30 G: 20 SOLUTION ORAL at 08:12

## 2024-12-19 RX ADMIN — TUBERCULIN PURIFIED PROTEIN DERIVATIVE 5 UNITS: 5 INJECTION, SOLUTION INTRADERMAL at 04:12

## 2024-12-19 NOTE — PLAN OF CARE
Pt Aaox4. Afebrile. VSS. No report of pain. Pt up ot he restroom with 1x assist. No report of injuries or a fall this shift. Bed in the lowest setting, call light within reach.

## 2024-12-19 NOTE — PROGRESS NOTES
Ref[place] [facility] [place] at [facility]. Pt pending medical and financial approval.    SW to follow with updates.    Griselda Romero LMSW  [facility]  [phone]

## 2024-12-19 NOTE — PROGRESS NOTES
Patient called for assistance ambulating to bathroom. RN at bedside - patient up with standby assistance and walker; nonskid socks on patient. Ambulated from bed to bathroom without difficulty but began to defecate on floor prior to reaching toilet. Patient attempted to reposition walker to sit on toilet but slipped and fell forward. Patient hit head on entrance to shower - bump noted above right eye, bleeding noted to left side of lip, bruising to nose, and skin tears to left upper arm and right hand. See media for photos. Assisted to chair and to bed per several staff members and PT. VSS, no change in mentation noted - patient remains AAO x4. Skin tears cleansed, Mepilex dressings applied. Dr. Oliva notified of above. STAT [facility] ordered.

## 2024-12-19 NOTE — PLAN OF CARE
Patient s/p fall this AM. Denies HA or pain. Bruise noted to nose, bump present above right eye (swelling improved over course of shift), lip no longer actively bleeding. CTH and CT abdomen/pelvis done this AM. In/out cath done - urine sent for ua/reflex to culture. TB skin test placed to right forearm - area marked. Antifungal cream applied as ordered to buttocks and inner thighs.

## 2024-12-19 NOTE — PLAN OF CARE
12/19/24 1258   Post-Acute Status   Post-Acute Authorization Placement   Post-Acute Placement Status Pending payor review/awaiting authorization (if required)   Discharge Plan   Discharge Plan A Skilled Nursing Facility   Discharge Plan B Home with family;Home Health      spoke with Simon at [facility] in [place] [phone], patient has been accepted to their facility for SNF but pending insurance authorization. Once Authorization has been obtained patient will be admitted to facility.     Discharge Plan A and Plan B have been determined by review of patient's clinical status, future medical and therapeutic needs, and coverage/benefits for post-acute care in coordination with multidisciplinary team members.     Ham Carrillo, RN, BSN  Case Management  [phone]

## 2024-12-19 NOTE — PT/OT/SLP PROGRESS
Physical Therapy Treatment    Patient Name:  Juan Carlos Yoo Sr.   MRN:  [id number]  Admitting Diagnosis:  Decompensated cirrhosis   Recent Surgery: * No surgery found *    Admit Date: 11/20/2024  Length of Stay: 29 days    Recommendations:     Discharge Recommendations:  Moderate Intensity Therapy  Discharge Equipment Recommendations: wheelchair, walker, rolling, bedside commode, grab bar, bath bench   Justification for Equipment: The mobility limitation cannot be sufficiently resolved by the use of a cane. Patient's functional mobility deficit can be sufficiently resolved with the use of a Rolling Walker. Patient's mobility limitation significantly impairs their ability to participate in one of more activities of daily living. The use of a Rolling Walker will significantly improve the patient's ability to participate in MRADLS and the patient will use it on regular basis in the home.   Barriers to discharge: Evolving Clinical Presentation    Assessment:     Juan Carlos Yoo Sr. is a 69 y.o. male admitted with a medical diagnosis of Decompensated cirrhosis.  He presents with the following impairments/functional limitations:  weakness, impaired endurance, impaired self care skills, impaired functional mobility, gait instability, impaired balance, pain, decreased safety awareness, decreased lower extremity function, impaired cognition, impaired skin.     PT called to room by nursing as patient had fallen in bathroom and was injured. Assisted patient into bedside chair and with clean-up, then assisted back to bed and repositioned. Pt with bloody arm, chin, and face. Unclear what caused fall in bathroom but there was ample stool and blood on the floor.    Rehab Prognosis: Fair; patient would benefit from acute skilled PT services to address these deficits and reach maximum level of function.    Recent Surgery: * No surgery found *      Treatment Tolerated: Fair    Highest level of mobility achieved this visit: mod A STS and step  "transfer for bedside chair to bed    Activity with RN/PCT: transfer with two person assist    Plan:     During this hospitalization, patient to be seen 4 x/week to address the identified rehab impairments via gait training, therapeutic activities, therapeutic exercises, neuromuscular re-education and progress toward the following goals:    Plan of Care Expires:  12/27/24    Subjective     RN notified prior to session. No family present upon PT entrance into room.    Chief Complaint: soreness  Patient/Family Comments/goals: "I don't know what happened"  Pain/Comfort:  Pain Rating 1:  (unrated)  Location - Orientation 1: generalized  Location 1: face  Pain Addressed 1: Distraction      Objective:     Additional staff present: multiple RNs and PCTs    Patient found  lying on the floor  with: Trialysis   Cognition:   Cooperative  Command following: Follows two-step verbal commands  Fluency: clear/fluent  General Precautions: Standard, fall   Orthopedic Precautions:N/A   Braces: N/A   Body mass index is 40.08 kg/m².  Oxygen Device: Room Air    Vitals: BP (!) 106/53   Pulse 93   Temp 97.5 °F (36.4 °C)   Resp 20   Ht 6' 1" (1.854 m)   Wt (!) 137.8 kg (303 lb 12.7 oz)   SpO2 100%   BMI 40.08 kg/m²     Outcome Measures:  AM-PAC 6 CLICK MOBILITY  Turning over in bed (including adjusting bedclothes, sheets and blankets)?: 3  Sitting down on and standing up from a chair with arms (e.g., wheelchair, bedside commode, etc.): 3  Moving from lying on back to sitting on the side of the bed?: 3  Moving to and from a bed to a chair (including a wheelchair)?: 3  Need to walk in hospital room?: 3  Climbing 3-5 steps with a railing?: 2  Basic Mobility Total Score: 17     Functional Mobility:    Bed Mobility:   Scooting to HOB via Supine bridge: moderate assistance of 2 persons  Sit to Supine: minimum assistance; to L side of bed    Sitting Balance at Edge of Bed:  Assistance Level Required: Contact Guard Assistance  Time: 2 " min  Postural deviations noted: no deviations noted    Transfers:   Sit > Stand Transfer: moderate assistance with hand-held assist   Stand > Sit Transfer: moderate assistance with hand-held assist   x1 trials from bedside chair  Floor > Chair Transfer: Partial stand from floor and pivot to bedside chair with maximal assistance of 3 persons with no assistive device  Chair on patient's behind  Chair > Bed Transfer: Stand Pivot and Step Transfer technique with moderate assistance with no assistive device  Bed on patient's L    Standing Balance:  Assistance Level Required: Moderate Assistance  Patient used: no assistive device   Time: 1 min  Postural deviations noted: slouched posture  Encouraged: upright stance  Comments: unsteady      Gait:  Patient ambulated: small steps to bed from chair   Patient required: moderate assist  Patient used:  no assistive device   Gait Pattern observed: swing-to gait  Gait Deviation(s): decreased step length, decreased osmar, decreased weight shift, and flexed posture  Impairments due to: impaired balance, decreased strength, decreased endurance, and impaired postural control  all lines remained intact throughout ambulation trial  Gait belt utilized    Education:  Time provided for education, counseling and discussion of health disposition in regards to patient's current status  All questions answered within PT scope of practice and to patient's satisfaction  PT role in POC to address current functional deficits  Pt educated on proper body mechanics, safety techniques, and energy conservation with PT facilitation and cueing throughout session    Patient left HOB elevated with all lines intact, call button in reach, and RN present.    GOALS:   Multidisciplinary Problems       Physical Therapy Goals          Problem: Physical Therapy    Goal Priority Disciplines Outcome Interventions   Physical Therapy Goal     PT, PT/OT Progressing    Description: Goals to be met by: 12/27/24      Patient will increase functional independence with mobility by performin. Supine to sit with Minimal Assistance  2. Sit to stand transfer with Stand By Assistance  3. Bed to chair transfer with Stand By Assistance using LRAD  4. Gait  x 150 feet with Stand By Assistance using No LRAD.   5. Pt sit on EOB x 15 min with SBA and perform functional activities to increased functional mobility.                        Time Tracking:     PT Received On: 24  PT Start Time: 0845     PT Stop Time: 0855  PT Total Time (min): 10 min     Billable Minutes:   Therapeutic Activity 10 min    Treatment Type: Treatment  PT/PTA: PT       Hank Cruz, PT, DPT  2024

## 2024-12-19 NOTE — PROGRESS NOTES
In/out cath done per MD request. 130cc cloudy susan urine returned. Dr. Oliva notified of above. Will send urine for UA with reflex to culture.

## 2024-12-19 NOTE — CARE UPDATE
Nephrology Chart Review      Intake/Output Summary (Last 24 hours) at 12/19/2024 1132  Last data filed at 12/19/2024 0608  Gross per 24 hour   Intake 1020 ml   Output 2100 ml   Net -1080 ml       Vitals:    12/18/24 2318 12/19/24 0425 12/19/24 0800 12/19/24 0844   BP: (!) 100/58 (!) 113/56 (!) 102/56 105/62   BP Location:    Right arm   Patient Position:    Sitting   Pulse: 75 85 90 (!) 112   Resp: 18 18 20    Temp: 97.6 °F (36.4 °C) 97.9 °F (36.6 °C) 97.7 °F (36.5 °C)    TempSrc:       SpO2: 97% 95% 99% (!) 94%   Weight:       Height:           Recent Labs   Lab 12/17/24  0641 12/18/24  0524 12/19/24  0630   * 128* 132*   K 3.7 3.2* 4.1    100 102   CO2 19* 19* 22*   BUN 15 20 17   CREATININE 2.6* 3.5* 3.1*   CALCIUM 8.0* 8.4* 8.5*   PHOS 2.9 3.8 3.4       No new recommendations at this time.     No other related issues identified. Please call Nephrology as needed; We will continue to follow.

## 2024-12-19 NOTE — PROGRESS NOTES
Pharmacist Renal Dose Adjustment Note    Juan Carlos Yoo Sr. is a 69 y.o. male being treated with the medication famotidine    Patient Data:    Vital Signs (Most Recent):  Temp: 97.7 °F (36.5 °C) (12/19/24 0800)  Pulse: (!) 112 (12/19/24 0844)  Resp: 20 (12/19/24 0800)  BP: 105/62 (12/19/24 0844)  SpO2: (!) 94 % (12/19/24 0844) Vital Signs (72h Range):  Temp:  [97.2 °F (36.2 °C)-98.6 °F (37 °C)]   Pulse:  []   Resp:  [11-22]   BP: ()/(50-73)   SpO2:  [94 %-100 %]      Recent Labs   Lab 12/17/24  0641 12/18/24  0524 12/19/24  0630   CREATININE 2.6* 3.5* 3.1*     Serum creatinine: 3.1 mg/dL (H) 12/19/24 0630  Estimated creatinine clearance: 31.9 mL/min (A)    Famotidine 20mg every other day was changed to 20mg daily.    Casi Davis, PharmD, BCPS  [phone]

## 2024-12-19 NOTE — PLAN OF CARE
12/17/24 1000   Post-Acute Status   Post-Acute Authorization Placement   Post-Acute Placement Status Referrals Sent   Discharge Plan   Discharge Plan A Skilled Nursing Facility   Discharge Plan B Home with family;Home Health     CM spoke with Juan Carlos Yoo in  Room [id number] and patient agrees with plan of care to discharge to SNF. Patient does not have a preference of facilities just want to be close to [place]. Referrals sent to multiple facilities near [place].     Discharge Plan A and Plan B have been determined by review of patient's clinical status, future medical and therapeutic needs, and coverage/benefits for post-acute care in coordination with multidisciplinary team members.     Ham Carrillo, RN, BSN  Case Management  [phone]

## 2024-12-19 NOTE — PROGRESS NOTES
Dr. Oliva at bedside to assess patient. Patient continues to deny headache or new onset pain. Left side of lip continues to bleed - pressure applied. Ice packs provided to patient.     1013 Patient off unit for CTH. CT abd/pelvis also ordered per MD - CT tech notified of above.

## 2024-12-20 LAB
ALBUMIN SERPL BCP-MCNC: 2.2 G/DL (ref 3.5–5.2)
ALP SERPL-CCNC: 91 U/L (ref 40–150)
ALT SERPL W/O P-5'-P-CCNC: 21 U/L (ref 10–44)
ANION GAP SERPL CALC-SCNC: 9 MMOL/L (ref 8–16)
AST SERPL-CCNC: 39 U/L (ref 10–40)
BACTERIA UR CULT: ABNORMAL
BASOPHILS # BLD AUTO: 0.08 K/UL (ref 0–0.2)
BASOPHILS NFR BLD: 1.3 % (ref 0–1.9)
BILIRUB SERPL-MCNC: 3.5 MG/DL (ref 0.1–1)
BUN SERPL-MCNC: 22 MG/DL (ref 8–23)
CALCIUM SERPL-MCNC: 8.5 MG/DL (ref 8.7–10.5)
CHLORIDE SERPL-SCNC: 102 MMOL/L (ref 95–110)
CO2 SERPL-SCNC: 19 MMOL/L (ref 23–29)
CREAT SERPL-MCNC: 3.5 MG/DL (ref 0.5–1.4)
DIFFERENTIAL METHOD BLD: ABNORMAL
EOSINOPHIL # BLD AUTO: 0.1 K/UL (ref 0–0.5)
EOSINOPHIL NFR BLD: 2.2 % (ref 0–8)
ERYTHROCYTE [DISTWIDTH] IN BLOOD BY AUTOMATED COUNT: 27.7 % (ref 11.5–14.5)
EST. GFR  (NO RACE VARIABLE): 17.9 ML/MIN/1.73 M^2
GLUCOSE SERPL-MCNC: 87 MG/DL (ref 70–110)
HCT VFR BLD AUTO: 24.1 % (ref 40–54)
HGB BLD-MCNC: 7.9 G/DL (ref 14–18)
IMM GRANULOCYTES # BLD AUTO: 0.05 K/UL (ref 0–0.04)
IMM GRANULOCYTES NFR BLD AUTO: 0.8 % (ref 0–0.5)
INR PPP: 1.8 (ref 0.8–1.2)
LYMPHOCYTES # BLD AUTO: 0.8 K/UL (ref 1–4.8)
LYMPHOCYTES NFR BLD: 12.6 % (ref 18–48)
MCH RBC QN AUTO: 27.2 PG (ref 27–31)
MCHC RBC AUTO-ENTMCNC: 32.8 G/DL (ref 32–36)
MCV RBC AUTO: 83 FL (ref 82–98)
MONOCYTES # BLD AUTO: 1.4 K/UL (ref 0.3–1)
MONOCYTES NFR BLD: 23.1 % (ref 4–15)
NEUTROPHILS # BLD AUTO: 3.6 K/UL (ref 1.8–7.7)
NEUTROPHILS NFR BLD: 60 % (ref 38–73)
NRBC BLD-RTO: 0 /100 WBC
PHOSPHATE SERPL-MCNC: 4.1 MG/DL (ref 2.7–4.5)
PLATELET # BLD AUTO: 78 K/UL (ref 150–450)
PMV BLD AUTO: 10.7 FL (ref 9.2–12.9)
POTASSIUM SERPL-SCNC: 3.4 MMOL/L (ref 3.5–5.1)
PROT SERPL-MCNC: 5.6 G/DL (ref 6–8.4)
PROTHROMBIN TIME: 18.8 SEC (ref 9–12.5)
RBC # BLD AUTO: 2.9 M/UL (ref 4.6–6.2)
SODIUM SERPL-SCNC: 130 MMOL/L (ref 136–145)
WBC # BLD AUTO: 6.01 K/UL (ref 3.9–12.7)

## 2024-12-20 PROCEDURE — 25000003 PHARM REV CODE 250: Performed by: INTERNAL MEDICINE

## 2024-12-20 PROCEDURE — 25000003 PHARM REV CODE 250

## 2024-12-20 PROCEDURE — 84100 ASSAY OF PHOSPHORUS: CPT

## 2024-12-20 PROCEDURE — 85610 PROTHROMBIN TIME: CPT | Performed by: INTERNAL MEDICINE

## 2024-12-20 PROCEDURE — 25000003 PHARM REV CODE 250: Performed by: STUDENT IN AN ORGANIZED HEALTH CARE EDUCATION/TRAINING PROGRAM

## 2024-12-20 PROCEDURE — 80053 COMPREHEN METABOLIC PANEL: CPT

## 2024-12-20 PROCEDURE — 20600001 HC STEP DOWN PRIVATE ROOM

## 2024-12-20 PROCEDURE — 85025 COMPLETE CBC W/AUTO DIFF WBC: CPT

## 2024-12-20 RX ORDER — METOPROLOL SUCCINATE 25 MG/1
25 TABLET, EXTENDED RELEASE ORAL 2 TIMES DAILY
Status: ON HOLD | COMMUNITY
Start: 2024-10-18 | End: 2024-12-23 | Stop reason: HOSPADM

## 2024-12-20 RX ORDER — HYDROCORTISONE 5 MG/1
TABLET ORAL
Status: CANCELLED
Start: 2024-12-20

## 2024-12-20 RX ORDER — FAMOTIDINE 20 MG/1
20 TABLET, FILM COATED ORAL EVERY OTHER DAY
Status: DISCONTINUED | OUTPATIENT
Start: 2024-12-22 | End: 2024-12-31 | Stop reason: HOSPADM

## 2024-12-20 RX ORDER — TAMSULOSIN HYDROCHLORIDE 0.4 MG/1
0.8 CAPSULE ORAL NIGHTLY
Status: CANCELLED
Start: 2024-12-20 | End: 2025-12-20

## 2024-12-20 RX ORDER — METOPROLOL TARTRATE 25 MG/1
12.5 TABLET, FILM COATED ORAL 2 TIMES DAILY
Status: CANCELLED
Start: 2024-12-20 | End: 2025-12-20

## 2024-12-20 RX ADMIN — LACTULOSE 30 G: 20 SOLUTION ORAL at 02:12

## 2024-12-20 RX ADMIN — MICONAZOLE NITRATE: 20 OINTMENT TOPICAL at 10:12

## 2024-12-20 RX ADMIN — MICONAZOLE NITRATE: 20 OINTMENT TOPICAL at 08:12

## 2024-12-20 RX ADMIN — FAMOTIDINE 20 MG: 20 TABLET ORAL at 08:12

## 2024-12-20 RX ADMIN — MIDODRINE HYDROCHLORIDE 20 MG: 5 TABLET ORAL at 02:12

## 2024-12-20 RX ADMIN — MIDODRINE HYDROCHLORIDE 20 MG: 5 TABLET ORAL at 10:12

## 2024-12-20 RX ADMIN — HYDROCORTISONE 10 MG: 5 TABLET ORAL at 06:12

## 2024-12-20 RX ADMIN — LACTULOSE 30 G: 20 SOLUTION ORAL at 08:12

## 2024-12-20 RX ADMIN — LACTULOSE 30 G: 20 SOLUTION ORAL at 10:12

## 2024-12-20 RX ADMIN — MIDODRINE HYDROCHLORIDE 20 MG: 5 TABLET ORAL at 06:12

## 2024-12-20 RX ADMIN — HYDROCORTISONE 5 MG: 5 TABLET ORAL at 02:12

## 2024-12-20 NOTE — NURSING
Pt has not voided this shift, on HD. MAYURI Kraus MD notified, straight cath not need if pt is asymptomatic.

## 2024-12-20 NOTE — PROGRESS NOTES
Please see previous notes from this SW for continuity.    __________________________________________________  SW spoke with the unit manager Doreen at [facility]. Per Doreen, pt accepted on an outpt basis. Pt's dialysis is schedule is modified due to the holiday.    Please see below for pt's outpt dialysis information:      Inpt treatment team updated via secure chat.    UPDATE 5:00PM:    [facility] medical director concerned about pt's blood pressure. Medical director would like pt's systolic blood pressure >110 before acceptance. Unit manager Doreen requested this SW to F/U Monday before acceptance.        Griselda Romero, THERESE  [facility]  [phone]

## 2024-12-20 NOTE — PROGRESS NOTES
[facility] Medicine  Progress Note    Patient Name: Juan Carlos Yoo Sr.  MRN: [id number]  Patient Class: IP- Inpatient   Admission Date: 11/20/2024  Length of Stay: 29 days  Attending Physician: Glory Oliva MD  Primary Care Provider: Nyla Rios FNP        Subjective     Principal Problem:Decompensated cirrhosis        HPI:  69M with PMH of alcoholic cirrhosis, esophageal varices, Afib on eliquis, and HTN who presents for c/o worsening diffuse swelling as well as R arm pain/erythema. He was recently hospitalized 1 month ago at [facility] for volume overload in the setting of decompensated cirrhosis. He was treated with IV diuresis and discharged with adjustment to his diuretics, currently on lasix 60mg BID and metolazone 2.5mg every other week as per family. Patient reports diffuse swelling of his upper and lower extremities in addition to distended abdomen and worsening dyspnea, which he believes is due to recent medication adjustment. Family states he is non-compliant with low sodium diet and fluid restriction. Family states he has been compliant with diuretics, but rom't taken eliquis for 3 days due to issue getting refill. He also reports scratching right arm on door frame 3-4 days ago which has become red and painful after wrapping in in bandage. He claims to be compliant with medications, but is a poor historian. He follows with hepatology, not currently active on transplant list. He states he hasn't consumed alcohol for at least 9 months. Has c/o chills, but denies fever, cough, nausea, vomiting, chest pain, dysuria, hematuria, blood in stool. Workup in ED remarkable for VS: T 97.6 HR 94 RR 22 BP 95/56 O2 sat 97% on RA. Hb 6.7, MCV 75, Plt 81, PT/INR 22.6/2.2, Na 128, K 6.1, BUN/Cr 49/5.7, Alb 2.8, AST/ALT 35/9, Ammonia 104, , Trop neg, LA 2.9, CXR: Cardiac size is enlarged similar to prior.  No large volume of pleural fluid noted although there is mild blunting of the  right costophrenic angle which may relate to a small amount of pleural fluid.  Suspected right basilar opacity, to be correlated clinically for infection. RUE venous doppler: No thrombus in central veins of the right upper extremity. Patient received vanc/zosyn and lasix 80mg IVP in ED and will be admitted to hospital medicine service for further management.    Overview/Hospital Course:  69 y.o. male with history of EtOH use disorder, EtOH cirrhosis, HCC s/p y90, morbid obesity BMI 44, HTN, and Afib who presents with severe anasarca and JAE.      Appreciate hepatology and nephrology recommendations.  Diuretics were held because of concern for HRS.  Patient was started on albumin and midodrine per Nephrology recommendations for HRS.  Renal function continued to worsen and recommendation per Nephrology to transfer to ICU for maintaining goal map over 85 on Levophed.  ICU was notified and patient to be assessed to be transferred to ICU.     Patient also has Gram-positive cocci and Gram-negative rods in his blood now.  Possible sources could be got translocation and barrier related infection through skin as he has been significantly edematous and has been oozing.  Has been on vancomycin and Rocephin.  Id was consulted this morning.  Repeat blood cultures were obtained.     Continues to have anemia.  Hemoglobin dropped on 11/20 and was given 1 unit of packed red blood cells.  Did not respond appropriately.  Hemoglobin dropped again to 6.8 on 11/21 and was given 1 unit of packed red blood cells.  Hemoglobin again on 11/22 is 7.2.  No reported melena or hematochezia.  Patient was on Eliquis for atrial fibrillation prior to admission which was held.  Given history of esophageal varices and portal hypertensive gastropathy whereas also could be component of slow bleeding, under production due to CKD and hemolysis while also with decompensated cirrhosis.  Started on Protonix IV b.i.d. and octreotide.     Also clarified with  hepatology.  Given patient's current infection we will await ID recommendations however will be challenging to consider transplant evaluation at this time.  Current concerns regarding transplant include higher BMI, frailty / debility.      Goal clarification with the patient and family.  Patient at this time wants to be full code and continue with Levophed in ICU.  They would consider discussion with palliative care in a day or so if things do not get better.      In MICU patient started on Levophed, however titration remained difficult given tachycardic response from the patient.      11/24 Trialysis and arterial lines placed overnight and currently on levo and norepi. SLED today.     11/25 Boo dc. Increased midodrine. Continue steroids until d/c pressors. Discussed with Nephrology about our concern for sustained use of pressors to achieve their MAP goals of 85. Will follow up tomorrow.      11/26 Platelets 48. Repeat 38 confirming thrombocytopenia. Concern for HIT. D/c heparin. Increased lactulose dosing. Bladder scan revealed urine in bladder. Boo placed. Off vaso. Continuing levo. Maintain MAP goals 80. PT/OT consulted.      11/27 MAP goal 75-80. Heparin antibody result pending.      11/28 Pt with abdominal pain, decreased bowel movements, emesis. Lactic acid 2.9 and repeat 2.7. Less concerned for mesenteric ischemia and more of a concern was for SBO. NG tube placed with subsequent suction of 500mL fluid. Abdomen less distended after placement and pt subsequently had bowel movement. MAP Goal of 60 with plan to come off pressors entirely and switch to dialysis after permacath, as he is not  liver transplant eligible at this time.      11/29 Pt with ileus. Clear liquids for now. Platelets 24. HIT versus zosyn induced? Peripheral smear sent. Zosyn changed to kaylah. Consent and transfuse 1U. GOC conversation today. Pt opting for long term dialysis.      11/30 naeon. Started back on heparin. One time dose of 120mg  lasix today. Hold off SLED/SCUF today and give IV lasix 120 mg once; plan for SLED/SCUF tomorrow      12/1 Patient is becoming more dependent on dialysis. Not a current liver transplant candidate. Still complaining of abdominal pain after discontinuing the NGT. AXR with evidence of dilated bowel loops. Brown bomb administered. The days following administration of the brown bomb, patient had more frequent bowel movements and was able to pass flatulence. Constipation eventually resolved.   Given the need for pressor support, he was worked up for adrenal insufficiency which was positive. Consequently, he was started on steroids for adrenal insufficiency. His blood pressures improved, however, he still required pressor support, particularly during dialysis. Pressor support was eventually weaned off. Patient was started on midodrine to help with BP. Nephrology trialed intermittent HD and patient seemed to tolerate it well. Nephrology recommended placement of tunneled catheter for HD. Patient was medically stable for step down to the hospital medicine floors.     Patient underwent testing with cosyntropin stimulation test, results are not compatible with adrenal insufficiency, cont hydrocortisone taper. Patient tolerating HD. IR placed RIJ TDC 12/16. Discharge planning to SNF.    12/19- patient was walking to the bathroom and had a mechanical fall with a his foot stumbling and hit his face of the ledge in the bathroom.  Although he walked with a walker and had nursing staff to help however could not be controlled.  Small contusion of the right glabella and the nose bridge.  CT head without any acute fractures.  Also noting abdominal distention.  We will request a paracentesis.  CT abdomen without any concern for hematoma.    Interval History:     NAEON. slipped and fell when tryting to reach toilet., CTH an CT abdomen unremarkable. Will monitor oozing from lip, don't feel a stitch needed at this time. Continue PT and  encourage use of BS commode.     Also overall decreased urine output, straight cath with 130ml . Will monitor    CM assisting with finding HD chair and discharge planning to SNF.     Review of Systems   Constitutional:  Negative for chills and fever.   HENT:  Negative for nosebleeds.    Respiratory:  Negative for cough, chest tightness and shortness of breath.    Cardiovascular:  Positive for leg swelling.   Gastrointestinal:  Positive for abdominal distention and diarrhea. Negative for abdominal pain and vomiting.   Genitourinary:  Positive for decreased urine volume, difficulty urinating and scrotal swelling.   Musculoskeletal:  Negative for neck pain.   Skin:  Positive for pallor and wound.   Neurological:  Negative for dizziness, syncope, light-headedness and headaches.   Psychiatric/Behavioral:  Negative for confusion.    All other systems reviewed and are negative.    Objective:     Vital Signs (Most Recent):  Temp: 97.7 °F (36.5 °C) (12/19/24 2006)  Pulse: 92 (12/19/24 2006)  Resp: 20 (12/19/24 1522)  BP: 104/64 (12/19/24 2006)  SpO2: 99 % (12/19/24 2006) Vital Signs (24h Range):  Temp:  [97.5 °F (36.4 °C)-98.1 °F (36.7 °C)] 97.7 °F (36.5 °C)  Pulse:  [] 92  Resp:  [18-20] 20  SpO2:  [94 %-100 %] 99 %  BP: ()/(53-64) 104/64   Weight: (!) 137.8 kg (303 lb 12.7 oz)  Body mass index is 40.08 kg/m².      Intake/Output Summary (Last 24 hours) at 12/19/2024 2037  Last data filed at 12/19/2024 1605  Gross per 24 hour   Intake 960 ml   Output 130 ml   Net 830 ml          Physical Exam  Vitals and nursing note reviewed.   Constitutional:       General: He is awake. He is not in acute distress.     Appearance: He is obese. He is ill-appearing. He is not toxic-appearing.   HENT:      Head:      Comments: Contusion and small bump around right glabella.      Mouth/Throat:      Comments: Lower lip with mild injury, mild oozing   Eyes:      Extraocular Movements: Extraocular movements intact.       Conjunctiva/sclera: Conjunctivae normal.   Cardiovascular:      Rate and Rhythm: Normal rate.   Pulmonary:      Effort: Pulmonary effort is normal. No respiratory distress.   Abdominal:      General: There is distension.      Palpations: Abdomen is soft.      Tenderness: There is no abdominal tenderness. There is no guarding or rebound.   Musculoskeletal:         General: No swelling or tenderness.      Right lower leg: Edema present.      Left lower leg: Edema present.      Comments: 2+ pitting edema in bilateral lower extremities   Skin:     General: Skin is warm.      Coloration: Skin is not jaundiced.      Findings: Bruising present.   Neurological:      Mental Status: He is alert and oriented to person, place, and time.      Motor: No weakness.   Psychiatric:         Behavior: Behavior normal.            Significant Labs:    CBC/Anemia Profile:  Recent Labs   Lab 12/18/24  0524 12/19/24  0630   WBC 6.50 6.15   HGB 8.4* 8.0*   HCT 25.8* 24.8*   PLT 60* 61*   MCV 82 83   RDW 26.8* 27.1*        Chemistries:  Recent Labs   Lab 12/18/24  0524 12/19/24  0630   * 132*   K 3.2* 4.1    102   CO2 19* 22*   BUN 20 17   CREATININE 3.5* 3.1*   CALCIUM 8.4* 8.5*   ALBUMIN 2.2* 2.2*   PROT 5.9* 5.5*   BILITOT 3.2* 3.3*   ALKPHOS 97 91   ALT 21 24   AST 35 41*   MG 2.1 2.0   PHOS 3.8 3.4       All pertinent labs within the past 24 hours have been reviewed.    Significant Imaging:  I have reviewed all pertinent imaging results/findings within the past 24 hours.    Assessment and Plan     * Decompensated cirrhosis  Etoh Cirrhosis  Hx of HCC s/p Y90    MELD 3.0: 30 at 12/19/2024  6:30 AM  MELD-Na: 31 at 12/19/2024  6:30 AM  Calculated from:  Serum Creatinine: On dialysis. Using the maximum value.  Serum Sodium: 132 mmol/L at 12/19/2024  6:30 AM  Total Bilirubin: 3.3 mg/dL at 12/19/2024  6:30 AM  Serum Albumin: 2.2 g/dL at 12/19/2024  6:30 AM  INR(ratio): 1.6 at 12/19/2024  6:30 AM  Age at listing (hypothetical): 69  years  Sex: Male at 12/19/2024  6:30 AM    Cont lactulose    H/O HCC: treated with Y90 in June 2023.Last MRI abd w/o evidence of residual or recurrent disease 3/24/24. Due for repeat surveillance MRI as an outpatient.    Appreciate hepatology recommendations.   Current concerns regarding transplant include higher BMI, frailty / debility.       Discharge planning to SNF       Morbid obesity  Body mass index is 40.08 kg/m². Morbid obesity complicates all aspects of disease management from diagnostic modalities to treatment. Weight loss encouraged and health benefits explained to patient.         Adrenal insufficiency  On going problem since admission, most likely multifactorial - component of liver dysfunction, HRS, sepsis on admission.  S/p pressors in ICU     Random cortisol was 6   Persistent hypotension thought to be adrenal insufficiency. Patient underwent testing with cosyntropin stimulation test, results are not compatible with adrenal insufficiency. appreciate endocrinology recommendations   Cont hydrocortisone taper      Moderate protein-calorie malnutrition  Nutrition consulted. Most recent weight and BMI monitored-     Measurements:  Wt Readings from Last 1 Encounters:   12/13/24 (!) 137.8 kg (303 lb 12.7 oz)   Body mass index is 40.08 kg/m².    Patient has been screened and assessed by RD.    Malnutrition Type:  Context: acute illness or injury  Level: moderate    Malnutrition Characteristic Summary:  Energy Intake (Malnutrition): less than 75% for greater than 7 days  Subcutaneous Fat (Malnutrition): mild depletion  Muscle Mass (Malnutrition): mild depletion  Fluid Accumulation (Malnutrition): moderate to severe    Interventions/Recommendations (treatment strategy):  1.)      Hypotension        Edema  Volume mgmt with RRT      Debility  Patient with Acute on chronic debility due to chronic unspecified fatigue, age-related physical debility, and other reduced mobility. The patient's latest [facility] (Activity  Measure for Post Acute Care) Score is listed below.    AM-PAC Score - How much help does the patient need for each activity listed  Basic Mobility Total Score: 17  Turning over in bed (including adjusting bedclothes, sheets and blankets)?: A little  Sitting down on and standing up from a chair with arms (e.g., wheelchair, bedside commode, etc.): A little  Moving from lying on back to sitting on the side of the bed?: A little  Moving to and from a bed to a chair (including a wheelchair)?: A little  Need to walk in hospital room?: A little  Climbing 3-5 steps with a railing?: A lot    Plan  - Progressive mobility protocol initated  - PT/OT consulted  - Fall precautions in place  - PT/OT recommending LI therapy. Planning for SNF      Advanced care planning/counseling discussion        Palliative care encounter  Goals of care, counseling/discussion  Advance Care Planning  Patient requests FULL CODE status and would like to continue current treatment      Abdominal pain        Gram-negative bacteremia  Blood cultures from admission with B. Diminuta, micrococcus luteus, lysinobacillus species. Seen by ID previously who recommended stopping antibiotics due to concern these were contaminants. Repeat blood cultures have been no growth. Has since been transferred to ICU with increased pressor requirement. Blood cultures repeated on 11/24 are no growth x 24 hours.   11/29 Switched from zosyn to meropenem due to concern of thrombocytopenia.   Finished meropenem course 12/8 @ 6 PM    Encephalopathy, metabolic  2/2 decompensated cirrhosis and JAE  Resolved    Hyponatremia  Hyponatremia is likely due to Cirrhosis. The patient's most recent sodium results are listed below.  Recent Labs     12/17/24  0641 12/18/24  0524 12/19/24  0630   * 128* 132*       Plan  - Correct the sodium by 4-6mEq in 24 hours.   - Appreciate nephrology recommendations  - Monitor sodium daily  - Patient hyponatremia is stable  - Mgmt with  HD    Thrombocytopenia  The likely etiology of thrombocytopenia is liver disease. The patients 3 most recent labs are listed below.  Recent Labs     12/17/24  0641 12/18/24  0524 12/19/24  0630   PLT 57* 60* 61*       Plan  - Will transfuse if platelet count is <50k (if undergoing surgical procedure or have active bleeding).      Microcytic anemia  Anemia is likely due to chronic disease due to Chronic liver disease. Most recent hemoglobin and hematocrit are listed below.  Recent Labs     12/17/24  0641 12/18/24  0524 12/19/24  0630   HGB 9.0* 8.4* 8.0*   HCT 26.1* 25.8* 24.8*       Plan  - Monitor serial CBC: Daily  - Transfuse PRBC if patient becomes hemodynamically unstable, symptomatic or H/H drops below 7/21.  - Patient has received 1 units of PRBCs on 11/20, 11/21, 12/15  - Patient's anemia is currently stable  - Hb baseline 7-8. No obvious bleeding  - Eliquis held on admission.  DVT prophylaxis held.    Stage 3a chronic kidney disease  ESRD on HD  Nephrology following  Will try to remove more fluid  Strict intake and output  Renally dose all medications  Avoid nephrotoxic agents  IR placed RIJ TDC 12/16  CM assisting with finding HD chair      JAE (acute kidney injury)  Baseline creatinine is  1.5 . Most recent creatinine and eGFR are listed below.    Recent Labs     12/17/24  0641 12/18/24  0524 12/19/24  0630   CREATININE 2.6* 3.5* 3.1*   EGFRNORACEVR 25.6* 17.9* 20.7*        Plan  - JAE is worsening. Will continue current treatment  - Avoid nephrotoxins and renally dose meds for GFR listed above  - Monitor urine output, serial BMP, and adjust therapy as needed    - Etiology includes volume overload, obstruction, hypotension, possible HRS  Baseline creatinine is 1.5. May require dialysis long term.   Patient tolerated intermittent HD well (12/12)    IR placed RIJ TDC 12/16. Working to get HD chair.    Atrial fibrillation  Patient has persistent (7 days or more) atrial fibrillation. Patient is currently in  atrial fibrillation. [id number] Score: 1. The patients heart rate in the last 24 hours is as follows:  Pulse  Min: 75  Max: 112       Plan  - Patient's afib is currently controlled  Holding home Eliquis in the setting of recent low blood counts  Continue metoprolol 12.5 mg BID     Coagulopathy  2/2 cirrhosis. See plan below    Recent Labs   Lab 12/19/24  0630   INR 1.6*     Received 3 doses of IV vitamin K.  End Stage Liver Disease precipitated coagulopathy  Heparin d/c d/t thrombocytopenia          Goals of care, counseling/discussion  Advance Care Planning    Code Status  In light of the patients advanced and life limiting illness,I engaged the the patient and family in a voluntary conversation about the patient's preferences for care  at the very end of life. The patient wishes to have a natural, peaceful death.  Along those lines, the patient wishes to have CPR or other invasive treatments performed when his heart and/or breathing stops. I communicated to the patient and family. Continue FULL CODE.    A total of 15 min was spent on advance care planning, goals of care discussion, emotional support, formulating and communicating prognosis and exploring burden/benefit of various approaches of treatment. This discussion occurred on a fully voluntary basis with the verbal consent of the patient and/or family.           VTE Risk Mitigation (From admission, onward)           Ordered     Place sequential compression device  Until discontinued         11/22/24 1329     IP VTE HIGH RISK PATIENT  Once         11/20/24 1622                    Discharge Planning   AUTUMN: 12/20/2024     Code Status: Full Code   Medical Readiness for Discharge Date:   Discharge Plan A: Skilled Nursing Facility   Discharge Delays: None known at this time            Please place Justification for DME        Glory Oliva MD  Department of Hospital Medicine   [facility] - [facility] Step[facility]

## 2024-12-20 NOTE — ASSESSMENT & PLAN NOTE
2/2 cirrhosis. See plan below    Recent Labs   Lab 12/19/24  0630   INR 1.6*     Received 3 doses of IV vitamin K.  End Stage Liver Disease precipitated coagulopathy  Heparin d/c d/t thrombocytopenia

## 2024-12-20 NOTE — PLAN OF CARE
NURSING HOME ORDERS    12/20/2024  [facility]  DOMINGO [facility]CIN[facility] - TRANSPLANT STEPDOWN  [address]  [facility] [address]  Dept: [phone]  Loc: [phone]     Admit to Nursing Home:      Diagnoses:  Active Hospital Problems    Diagnosis  POA    *Decompensated cirrhosis [K72.90, K74.60]  Yes    Morbid obesity [E66.01]  Yes    Adrenal insufficiency [E27.40]  Yes    Moderate protein-calorie malnutrition [E44.0]  No    Edema [R60.9]  Yes    Palliative care encounter [Z51.5]  Not Applicable    Debility [R53.81]  Yes    Gram-negative bacteremia [R78.81]  Yes    Thrombocytopenia [D69.6]  Yes    Hyponatremia [E87.1]  Yes     Chronic    Encephalopathy, metabolic [G93.41]  Yes    Stage 3a chronic kidney disease [N18.31]  Yes    Microcytic anemia [D50.9]  Yes    JAE (acute kidney injury) [N17.9]  Yes    Atrial fibrillation [I48.91]  Yes    Coagulopathy [D68.9]  Yes    Goals of care, counseling/discussion [Z71.89]  Not Applicable      Resolved Hospital Problems    Diagnosis Date Resolved POA    Hyperkalemia [E87.5] 12/19/2024 Yes    Hypokalemia [E87.6] 12/19/2024 Yes    Hypophosphatemia [E83.39] 12/19/2024 Yes    Hyperkalemia [E87.5] 11/22/2024 No    Severe sepsis [A41.9, R65.20] 12/19/2024 Yes       Patient is homebound due to:  Decompensated cirrhosis    Allergies:Review of patient's allergies indicates:  No Known Allergies    Vitals:  Every shift    Diet: renal diet and 2 gram sodium diet    Activities:   Up in a chair each morning as tolerated, Ambulate with assistance to bathroom, and Activity as tolerated    Goals of Care Treatment Preferences:  Code Status: Full Code    Health care agent: Pt reports he has fille ut MPOA and his son is MPOA.  Health care agent number: No value filed.          What is most important right now is to focus on remaining as independent as possible, symptom/pain control, extending life as long as possible, even it it means sacrificing quality,  curative/life-prolongation (regardless of treatment burdens).  Accordingly, we have decided that the best plan to meet the patient's goals includes continuing with treatment.      Labs:  BMP thrice weekly with dialysis     Nursing Precautions:  Aspiration , Fall, and Pressure ulcer prevention    Consults:   PT to evaluate and treat- 3 times a week, OT to evaluate and treat- 3 times a week, Wound Care, and Nutrition to evaluate and recommend diet     Miscellaneous Care: Wound Care: yes:       Wound 12/13/24 1630 Moisture associated dermatitis Buttocks - - Buttocks and posterior/inner thighs: bedside nursing to cleanse with bath wipes, pat dry and apply antifungal ointment bid/prn; no dressings  - Turning every 2 hours  - Heel lift boots        Central :  - Sterile dressing changes are done weekly and as needed.  - Use chlor-hexadine scrub to cleanse site, apply Biopatch to insertion site, apply securement device dressing  - Injection caps are changed weekly and after EVERY lab draw.  - If sterile gauze is under dressing to control oozing, dressing change must be performed every 24 hours until gauze is not needed.                   Diabetes Care:  SN to perform and educate Diabetic management with blood glucose monitoring: and Report CBG < 60 or > 350 to physician.      Medications: Discontinue all previous medication orders, if any. See new list below.     Medication List        ASK your doctor about these medications      aMILoride 5 MG Tab  Commonly known as: MIDAMOR  Take 2 tablets (10 mg total) by mouth once daily. Start with 5 mg (1 tablet) daily, increase to 10 mg (2 tablets) daily if not losing 3 pounds a week.     amLODIPine 10 MG tablet  Commonly known as: NORVASC  Take 1 tablet (10 mg total) by mouth once daily.     ascorbic acid (vitamin C) 500 MG tablet  Commonly known as: VITAMIN C  Take 500 mg by mouth once daily.     cyanocobalamin 100 MCG tablet  Commonly known as: VITAMIN B-12  Take  100 mcg by mouth once daily.     fish oil-omega-3 fatty acids 300-1,000 mg capsule  Take 1 g by mouth 2 (two) times daily.     furosemide 40 MG tablet  Commonly known as: LASIX  TAKE ONE TABLET BY MOUTH TWICE DAILY     nadoloL 20 MG tablet  Commonly known as: CORGARD  TAKE 2 TABLETS BY MOUTH EVERY EVENING     potassium chloride SA 20 MEQ tablet  Commonly known as: KLOR-CON M20  Take 1 tablet (20 mEq total) by mouth 2 (two) times daily.     tamsulosin 0.4 mg Cap  Commonly known as: FLOMAX  TAKE 1 CAPSULE (0.4 MG TOTAL) BY MOUTH EVERY EVENING.                Immunizations Administered as of 12/20/2024       Name Date Dose VIS Date Route Exp Date    COVID-19, MRNA, LN-S PF (Moderna) 3/27/2021 0.5 mL 10/6/2009 Intramuscular --    Site: Left arm     : Modern[employer]     Lot: [id number]     Comment: Adminis     COVID-19, MRNA LN-S PF (Moderna) 2/25/2021 0.5 mL 12/1/2020 Intramuscular --    : Moderna [employer]     Comment: Adminis             Some patients may experience side effects after vaccination.  These may include fever, headache, muscle or joint aches.  Most symptoms resolve with 24-48 hours and do not require urgent medical evaluation unless they persist for more than 72 hours or symptoms are concerning for an unrelated medical condition.          _________________________________  Glory Oliva MD  12/20/2024

## 2024-12-20 NOTE — ASSESSMENT & PLAN NOTE
The likely etiology of thrombocytopenia is liver disease. The patients 3 most recent labs are listed below.  Recent Labs     12/17/24  0641 12/18/24  0524 12/19/24  0630   PLT 57* 60* 61*       Plan  - Will transfuse if platelet count is <50k (if undergoing surgical procedure or have active bleeding).

## 2024-12-20 NOTE — ASSESSMENT & PLAN NOTE
Baseline creatinine is  1.5 . Most recent creatinine and eGFR are listed below.    Recent Labs     12/17/24  0641 12/18/24  0524 12/19/24  0630   CREATININE 2.6* 3.5* 3.1*   EGFRNORACEVR 25.6* 17.9* 20.7*        Plan  - JAE is worsening. Will continue current treatment  - Avoid nephrotoxins and renally dose meds for GFR listed above  - Monitor urine output, serial BMP, and adjust therapy as needed    - Etiology includes volume overload, obstruction, hypotension, possible HRS  Baseline creatinine is 1.5. May require dialysis long term.   Patient tolerated intermittent HD well (12/12)    IR placed RIJ TDC 12/16. Working to get HD chair.

## 2024-12-20 NOTE — PT/OT/SLP PROGRESS
Occupational Therapy      Patient Name:  Juan Carlos Yoo Sr.   MRN:  [id number]    Patient not seen today secondary to GISSELLE at 11:30 and 2nd attempt patient requested to return later secondary to him just receiving his lunch at 13:03. Unable to return for third attempt. Will follow-up per OT POC.    12/20/2024   18

## 2024-12-20 NOTE — ASSESSMENT & PLAN NOTE
On going problem since admission, most likely multifactorial - component of liver dysfunction, HRS, sepsis on admission.  S/p pressors in ICU     Random cortisol was 6   Persistent hypotension thought to be adrenal insufficiency. Patient underwent testing with cosyntropin stimulation test, results are not compatible with adrenal insufficiency. appreciate endocrinology recommendations   Cont hydrocortisone taper

## 2024-12-20 NOTE — ASSESSMENT & PLAN NOTE
Etoh Cirrhosis  Hx of HCC s/p Y90    MELD 3.0: 30 at 12/19/2024  6:30 AM  MELD-Na: 31 at 12/19/2024  6:30 AM  Calculated from:  Serum Creatinine: On dialysis. Using the maximum value.  Serum Sodium: 132 mmol/L at 12/19/2024  6:30 AM  Total Bilirubin: 3.3 mg/dL at 12/19/2024  6:30 AM  Serum Albumin: 2.2 g/dL at 12/19/2024  6:30 AM  INR(ratio): 1.6 at 12/19/2024  6:30 AM  Age at listing (hypothetical): 69 years  Sex: Male at 12/19/2024  6:30 AM    Cont lactulose    H/O HCC: treated with Y90 in June 2023.Last MRI abd w/o evidence of residual or recurrent disease 3/24/24. Due for repeat surveillance MRI as an outpatient.    Appreciate hepatology recommendations.   Current concerns regarding transplant include higher BMI, frailty / debility.       Discharge planning to SNF

## 2024-12-20 NOTE — PLAN OF CARE
Problem: Fall Injury Risk  Goal: Absence of Fall and Fall-Related Injury  Outcome: Progressing     Problem: Liver Failure  Goal: Optimal Coping with Liver Failure  Outcome: Progressing  Goal: Optimal Gastrointestinal Function  Outcome: Progressing  Goal: Improved Oral Intake  Outcome: Progressing  Goal: Optimal Pain Control, Comfort and Function  Outcome: Progressing  Goal: Effective Oxygenation and Ventilation  Outcome: Progressing

## 2024-12-20 NOTE — ASSESSMENT & PLAN NOTE
Body mass index is 40.08 kg/m². Morbid obesity complicates all aspects of disease management from diagnostic modalities to treatment. Weight loss encouraged and health benefits explained to patient.

## 2024-12-20 NOTE — SUBJECTIVE & OBJECTIVE
Interval History:     NAEON. slipped and fell when tryting to reach toilet., CTH an CT abdomen unremarkable. Will monitor oozing from lip, don't feel a stitch needed at this time. Continue PT and encourage use of BS commode.     Also overall decreased urine output, straight cath with 130ml . Will monitor    CM assisting with finding HD chair and discharge planning to SNF.     Review of Systems   Constitutional:  Negative for chills and fever.   HENT:  Negative for nosebleeds.    Respiratory:  Negative for cough, chest tightness and shortness of breath.    Cardiovascular:  Positive for leg swelling.   Gastrointestinal:  Positive for abdominal distention and diarrhea. Negative for abdominal pain and vomiting.   Genitourinary:  Positive for decreased urine volume, difficulty urinating and scrotal swelling.   Musculoskeletal:  Negative for neck pain.   Skin:  Positive for pallor and wound.   Neurological:  Negative for dizziness, syncope, light-headedness and headaches.   Psychiatric/Behavioral:  Negative for confusion.    All other systems reviewed and are negative.    Objective:     Vital Signs (Most Recent):  Temp: 97.7 °F (36.5 °C) (12/19/24 2006)  Pulse: 92 (12/19/24 2006)  Resp: 20 (12/19/24 1522)  BP: 104/64 (12/19/24 2006)  SpO2: 99 % (12/19/24 2006) Vital Signs (24h Range):  Temp:  [97.5 °F (36.4 °C)-98.1 °F (36.7 °C)] 97.7 °F (36.5 °C)  Pulse:  [] 92  Resp:  [18-20] 20  SpO2:  [94 %-100 %] 99 %  BP: ()/(53-64) 104/64   Weight: (!) 137.8 kg (303 lb 12.7 oz)  Body mass index is 40.08 kg/m².      Intake/Output Summary (Last 24 hours) at 12/19/2024 2037  Last data filed at 12/19/2024 1605  Gross per 24 hour   Intake 960 ml   Output 130 ml   Net 830 ml          Physical Exam  Vitals and nursing note reviewed.   Constitutional:       General: He is awake. He is not in acute distress.     Appearance: He is obese. He is ill-appearing. He is not toxic-appearing.   HENT:      Head:      Comments: Contusion  and small bump around right glabella.      Mouth/Throat:      Comments: Lower lip with mild injury, mild oozing   Eyes:      Extraocular Movements: Extraocular movements intact.      Conjunctiva/sclera: Conjunctivae normal.   Cardiovascular:      Rate and Rhythm: Normal rate.   Pulmonary:      Effort: Pulmonary effort is normal. No respiratory distress.   Abdominal:      General: There is distension.      Palpations: Abdomen is soft.      Tenderness: There is no abdominal tenderness. There is no guarding or rebound.   Musculoskeletal:         General: No swelling or tenderness.      Right lower leg: Edema present.      Left lower leg: Edema present.      Comments: 2+ pitting edema in bilateral lower extremities   Skin:     General: Skin is warm.      Coloration: Skin is not jaundiced.      Findings: Bruising present.   Neurological:      Mental Status: He is alert and oriented to person, place, and time.      Motor: No weakness.   Psychiatric:         Behavior: Behavior normal.            Significant Labs:    CBC/Anemia Profile:  Recent Labs   Lab 12/18/24  0524 12/19/24  0630   WBC 6.50 6.15   HGB 8.4* 8.0*   HCT 25.8* 24.8*   PLT 60* 61*   MCV 82 83   RDW 26.8* 27.1*        Chemistries:  Recent Labs   Lab 12/18/24  0524 12/19/24  0630   * 132*   K 3.2* 4.1    102   CO2 19* 22*   BUN 20 17   CREATININE 3.5* 3.1*   CALCIUM 8.4* 8.5*   ALBUMIN 2.2* 2.2*   PROT 5.9* 5.5*   BILITOT 3.2* 3.3*   ALKPHOS 97 91   ALT 21 24   AST 35 41*   MG 2.1 2.0   PHOS 3.8 3.4       All pertinent labs within the past 24 hours have been reviewed.    Significant Imaging:  I have reviewed all pertinent imaging results/findings within the past 24 hours.

## 2024-12-20 NOTE — PLAN OF CARE
12/20/24 0922   Post-Acute Status   Post-Acute Authorization Placement   Post-Acute Placement Status Set-up Complete/Auth obtained   Discharge Delays (!) Dialysis Set-up   Discharge Plan   Discharge Plan A Skilled Nursing Facility   Discharge Plan B Skilled Nursing Facility     CM spoke with Simon Quigley at [facility], insurance gave authorization for SNF placement. Once dialysis set up patient can discharge to [facility]. 142 faxed to Simon as well.    Discharge Plan A and Plan B have been determined by review of patient's clinical status, future medical and therapeutic needs, and coverage/benefits for post-acute care in coordination with multidisciplinary team members.     Ham Carrillo, RN, BSN  Case Management  [phone]

## 2024-12-20 NOTE — ASSESSMENT & PLAN NOTE
Patient has persistent (7 days or more) atrial fibrillation. Patient is currently in atrial fibrillation. [id number] Score: 1. The patients heart rate in the last 24 hours is as follows:  Pulse  Min: 75  Max: 112       Plan  - Patient's afib is currently controlled  Holding home Eliquis in the setting of recent low blood counts  Continue metoprolol 12.5 mg BID

## 2024-12-20 NOTE — SUBJECTIVE & OBJECTIVE
Interval History: NAEON, BP remains low, s/p paracentesis   Plan for HD later today       Review of patient's allergies indicates:  No Known Allergies  Current Facility-Administered Medications   Medication Frequency    albuterol-ipratropium 2.5 mg-0.5 mg/3 mL nebulizer solution 3 mL Q6H PRN    aluminum-magnesium hydroxide-simethicone 200-200-20 mg/5 mL suspension 30 mL QID PRN    dextrose 10% bolus 125 mL 125 mL PRN    dextrose 10% bolus 250 mL 250 mL PRN    famotidine tablet 20 mg Daily    glucagon (human recombinant) injection 1 mg PRN    glucose chewable tablet 16 g PRN    glucose chewable tablet 24 g PRN    hydrocortisone tablet 10 mg Q24H    Followed by    [START ON 12/24/2024] hydrocortisone tablet 5 mg Q24H    hydrocortisone tablet 5 mg Q24H    lactulose 20 gram/30 mL solution Soln 30 g TID    melatonin tablet 6 mg Nightly PRN    metoprolol tartrate (LOPRESSOR) split tablet 12.5 mg BID    miconazole nitrate 2% ointment BID    midodrine tablet 15 mg PRN    midodrine tablet 20 mg Q8H    midodrine tablet 20 mg Daily PRN    naloxone 0.4 mg/mL injection 0.02 mg PRN    ondansetron injection 4 mg Q8H PRN    polyethylene glycol packet 17 g BID PRN    simethicone chewable tablet 80 mg QID PRN    sodium chloride 0.9% flush 10 mL PRN    tamsulosin 24 hr capsule 0.8 mg QHS       Objective:     Vital Signs (Most Recent):  Temp: 97.9 °F (36.6 °C) (12/20/24 0759)  Pulse: 80 (12/20/24 0759)  Resp: 20 (12/20/24 0759)  BP: (!) 107/59 (12/20/24 0759)  SpO2: 96 % (12/20/24 0759) Vital Signs (24h Range):  Temp:  [97.5 °F (36.4 °C)-98.1 °F (36.7 °C)] 97.9 °F (36.6 °C)  Pulse:  [] 80  Resp:  [17-20] 20  SpO2:  [94 %-100 %] 96 %  BP: ()/(53-64) 107/59     Weight: (!) 140.6 kg (310 lb) (12/20/24 0519)  Body mass index is 40.9 kg/m².  Body surface area is 2.69 meters squared.    I/O last 3 completed shifts:  In: 1440 [P.O.:1440]  Out: 130 [Urine:130]     Physical Exam  Constitutional:       General: He is not in acute  distress.     Appearance: He is obese. He is ill-appearing. He is not toxic-appearing.   HENT:      Head: Normocephalic and atraumatic.   Eyes:      Extraocular Movements: Extraocular movements intact.      Pupils: Pupils are equal, round, and reactive to light.   Cardiovascular:      Rate and Rhythm: Normal rate.   Pulmonary:      Effort: Pulmonary effort is normal. No respiratory distress.   Abdominal:      General: There is distension.      Tenderness: There is no abdominal tenderness. There is no guarding.   Musculoskeletal:         General: Swelling present.      Right lower leg: Edema present.      Left lower leg: Edema present.   Skin:     Coloration: Skin is pale. Skin is not jaundiced.   Neurological:      General: No focal deficit present.      Mental Status: He is alert and oriented to person, place, and time.          Significant Labs:  BMP:   Recent Labs   Lab 12/19/24  0630 12/20/24  0641    87   * 130*   K 4.1 3.4*    102   CO2 22* 19*   BUN 17 22   CREATININE 3.1* 3.5*   CALCIUM 8.5* 8.5*   MG 2.0  --      CBC:   Recent Labs   Lab 12/20/24  0641   WBC 6.01   RBC 2.90*   HGB 7.9*   HCT 24.1*   PLT 78*   MCV 83   MCH 27.2   MCHC 32.8        Significant Imaging:  Labs: Reviewed

## 2024-12-20 NOTE — PT/OT/SLP PROGRESS
Physical Therapy      Patient Name:  Juan Carlos Yoo .   MRN:  [id number]    Patient not seen today secondary to pt eating lunch upon attempt, writting PTA unable to makes second attempt.  Will follow-up at next PT POC date.

## 2024-12-20 NOTE — ASSESSMENT & PLAN NOTE
Patient's most recent phosphorus results are listed below.   Recent Labs     12/17/24  0641 12/18/24  0524 12/19/24  0630   PHOS 2.9 3.8 3.4     Plan  - Will treat hypophosphatemia with phosphate replacement  - Patient's hypophosphatemia is worsening. Will continue current treatment

## 2024-12-20 NOTE — ASSESSMENT & PLAN NOTE
Hyponatremia is likely due to Cirrhosis. The patient's most recent sodium results are listed below.  Recent Labs     12/17/24  0641 12/18/24  0524 12/19/24  0630   * 128* 132*       Plan  - Correct the sodium by 4-6mEq in 24 hours.   - Appreciate nephrology recommendations  - Monitor sodium daily  - Patient hyponatremia is stable  - Mgmt with HD

## 2024-12-20 NOTE — PLAN OF CARE
CM spoke to patient about discharging to [facility] and patient stated he is ready to go and happy he will be in [place]. All questions and concerns addressed.     Ham Carrillo, RN, BSN  Case Management  [phone]

## 2024-12-20 NOTE — ASSESSMENT & PLAN NOTE
Anemia is likely due to chronic disease due to Chronic liver disease. Most recent hemoglobin and hematocrit are listed below.  Recent Labs     12/17/24  0641 12/18/24  0524 12/19/24  0630   HGB 9.0* 8.4* 8.0*   HCT 26.1* 25.8* 24.8*       Plan  - Monitor serial CBC: Daily  - Transfuse PRBC if patient becomes hemodynamically unstable, symptomatic or H/H drops below 7/21.  - Patient has received 1 units of PRBCs on 11/20, 11/21, 12/15  - Patient's anemia is currently stable  - Hb baseline 7-8. No obvious bleeding  - Eliquis held on admission.  DVT prophylaxis held.

## 2024-12-21 LAB
ALBUMIN SERPL BCP-MCNC: 2.1 G/DL (ref 3.5–5.2)
ALP SERPL-CCNC: 102 U/L (ref 40–150)
ALT SERPL W/O P-5'-P-CCNC: 21 U/L (ref 10–44)
ANION GAP SERPL CALC-SCNC: 8 MMOL/L (ref 8–16)
AST SERPL-CCNC: 37 U/L (ref 10–40)
BASOPHILS # BLD AUTO: 0.07 K/UL (ref 0–0.2)
BASOPHILS NFR BLD: 1.1 % (ref 0–1.9)
BILIRUB SERPL-MCNC: 3 MG/DL (ref 0.1–1)
BUN SERPL-MCNC: 28 MG/DL (ref 8–23)
CALCIUM SERPL-MCNC: 8.5 MG/DL (ref 8.7–10.5)
CHLORIDE SERPL-SCNC: 101 MMOL/L (ref 95–110)
CO2 SERPL-SCNC: 21 MMOL/L (ref 23–29)
CREAT SERPL-MCNC: 4.1 MG/DL (ref 0.5–1.4)
DIFFERENTIAL METHOD BLD: ABNORMAL
EOSINOPHIL # BLD AUTO: 0.2 K/UL (ref 0–0.5)
EOSINOPHIL NFR BLD: 2.5 % (ref 0–8)
ERYTHROCYTE [DISTWIDTH] IN BLOOD BY AUTOMATED COUNT: 27.6 % (ref 11.5–14.5)
EST. GFR  (NO RACE VARIABLE): 14.8 ML/MIN/1.73 M^2
GLUCOSE SERPL-MCNC: 100 MG/DL (ref 70–110)
HCT VFR BLD AUTO: 23.8 % (ref 40–54)
HGB BLD-MCNC: 7.8 G/DL (ref 14–18)
IMM GRANULOCYTES # BLD AUTO: 0.08 K/UL (ref 0–0.04)
IMM GRANULOCYTES NFR BLD AUTO: 1.3 % (ref 0–0.5)
INR PPP: 1.5 (ref 0.8–1.2)
LYMPHOCYTES # BLD AUTO: 0.6 K/UL (ref 1–4.8)
LYMPHOCYTES NFR BLD: 10.3 % (ref 18–48)
MCH RBC QN AUTO: 27.6 PG (ref 27–31)
MCHC RBC AUTO-ENTMCNC: 32.8 G/DL (ref 32–36)
MCV RBC AUTO: 84 FL (ref 82–98)
MONOCYTES # BLD AUTO: 1.6 K/UL (ref 0.3–1)
MONOCYTES NFR BLD: 26.2 % (ref 4–15)
NEUTROPHILS # BLD AUTO: 3.6 K/UL (ref 1.8–7.7)
NEUTROPHILS NFR BLD: 58.6 % (ref 38–73)
NRBC BLD-RTO: 0 /100 WBC
PHOSPHATE SERPL-MCNC: 3.7 MG/DL (ref 2.7–4.5)
PLATELET # BLD AUTO: 89 K/UL (ref 150–450)
PMV BLD AUTO: 10.8 FL (ref 9.2–12.9)
POTASSIUM SERPL-SCNC: 3.3 MMOL/L (ref 3.5–5.1)
PROT SERPL-MCNC: 5.5 G/DL (ref 6–8.4)
PROTHROMBIN TIME: 16.4 SEC (ref 9–12.5)
RBC # BLD AUTO: 2.83 M/UL (ref 4.6–6.2)
SODIUM SERPL-SCNC: 130 MMOL/L (ref 136–145)
WBC # BLD AUTO: 6.11 K/UL (ref 3.9–12.7)

## 2024-12-21 PROCEDURE — 25000003 PHARM REV CODE 250

## 2024-12-21 PROCEDURE — 25000003 PHARM REV CODE 250: Performed by: INTERNAL MEDICINE

## 2024-12-21 PROCEDURE — 85610 PROTHROMBIN TIME: CPT | Performed by: INTERNAL MEDICINE

## 2024-12-21 PROCEDURE — 97110 THERAPEUTIC EXERCISES: CPT | Mod: CQ

## 2024-12-21 PROCEDURE — 99232 SBSQ HOSP IP/OBS MODERATE 35: CPT | Mod: ,,, | Performed by: INTERNAL MEDICINE

## 2024-12-21 PROCEDURE — 85025 COMPLETE CBC W/AUTO DIFF WBC: CPT

## 2024-12-21 PROCEDURE — 80053 COMPREHEN METABOLIC PANEL: CPT

## 2024-12-21 PROCEDURE — 84100 ASSAY OF PHOSPHORUS: CPT

## 2024-12-21 PROCEDURE — 36415 COLL VENOUS BLD VENIPUNCTURE: CPT | Performed by: INTERNAL MEDICINE

## 2024-12-21 PROCEDURE — 20600001 HC STEP DOWN PRIVATE ROOM

## 2024-12-21 PROCEDURE — 25000003 PHARM REV CODE 250: Performed by: STUDENT IN AN ORGANIZED HEALTH CARE EDUCATION/TRAINING PROGRAM

## 2024-12-21 PROCEDURE — 94761 N-INVAS EAR/PLS OXIMETRY MLT: CPT

## 2024-12-21 PROCEDURE — 97116 GAIT TRAINING THERAPY: CPT | Mod: CQ

## 2024-12-21 RX ORDER — SODIUM CHLORIDE 9 MG/ML
INJECTION, SOLUTION INTRAVENOUS ONCE
Status: CANCELLED | OUTPATIENT
Start: 2024-12-21 | End: 2024-12-21

## 2024-12-21 RX ORDER — HEPARIN SODIUM 1000 [USP'U]/ML
1000 INJECTION, SOLUTION INTRAVENOUS; SUBCUTANEOUS ONCE
Status: DISCONTINUED | OUTPATIENT
Start: 2024-12-21 | End: 2024-12-27

## 2024-12-21 RX ORDER — SODIUM CHLORIDE 9 MG/ML
INJECTION, SOLUTION INTRAVENOUS
Status: CANCELLED | OUTPATIENT
Start: 2024-12-21

## 2024-12-21 RX ADMIN — MIDODRINE HYDROCHLORIDE 20 MG: 5 TABLET ORAL at 09:12

## 2024-12-21 RX ADMIN — MICONAZOLE NITRATE: 20 OINTMENT TOPICAL at 11:12

## 2024-12-21 RX ADMIN — MICONAZOLE NITRATE: 20 OINTMENT TOPICAL at 09:12

## 2024-12-21 RX ADMIN — LACTULOSE 30 G: 20 SOLUTION ORAL at 02:12

## 2024-12-21 RX ADMIN — METOPROLOL TARTRATE 12.5 MG: 25 TABLET, FILM COATED ORAL at 09:12

## 2024-12-21 RX ADMIN — LACTULOSE 30 G: 20 SOLUTION ORAL at 09:12

## 2024-12-21 RX ADMIN — METOPROLOL TARTRATE 6.25 MG: 25 TABLET, FILM COATED ORAL at 11:12

## 2024-12-21 RX ADMIN — MIDODRINE HYDROCHLORIDE 20 MG: 5 TABLET ORAL at 02:12

## 2024-12-21 RX ADMIN — HYDROCORTISONE 10 MG: 5 TABLET ORAL at 05:12

## 2024-12-21 RX ADMIN — MIDODRINE HYDROCHLORIDE 20 MG: 5 TABLET ORAL at 05:12

## 2024-12-21 NOTE — ASSESSMENT & PLAN NOTE
2/2 cirrhosis. See plan below    Recent Labs   Lab 12/20/24  0641   INR 1.8*     Received 3 doses of IV vitamin K.  End Stage Liver Disease precipitated coagulopathy  Heparin d/c d/t thrombocytopenia

## 2024-12-21 NOTE — ASSESSMENT & PLAN NOTE
Anemia is likely due to chronic disease due to Chronic liver disease. Most recent hemoglobin and hematocrit are listed below.  Recent Labs     12/19/24  0630 12/20/24  0641 12/21/24  0615   HGB 8.0* 7.9* 7.8*   HCT 24.8* 24.1* 23.8*       Plan  - Monitor serial CBC: Daily  - Transfuse PRBC if patient becomes hemodynamically unstable, symptomatic or H/H drops below 7/21.  - Patient has received 1 units of PRBCs on 11/20, 11/21, 12/15  - Patient's anemia is currently stable  - Hb baseline 7-8. No obvious bleeding  - Eliquis held on admission.  DVT prophylaxis held.

## 2024-12-21 NOTE — ASSESSMENT & PLAN NOTE
The likely etiology of thrombocytopenia is liver disease. The patients 3 most recent labs are listed below.  Recent Labs     12/19/24  0630 12/20/24  0641 12/21/24  0615   PLT 61* 78* 89*       Plan  - Will transfuse if platelet count is <50k (if undergoing surgical procedure or have active bleeding).

## 2024-12-21 NOTE — PROGRESS NOTES
[facility] - Transplant [facility] Medicine  Progress Note    Patient Name: Juan Carlos Yoo Sr.  MRN: [id number]  Patient Class: IP- Inpatient   Admission Date: 11/20/2024  Length of Stay: 31 days  Attending Physician: Glory Oliva MD  Primary Care Provider: Nyla Rios FNP        Subjective     Principal Problem:Decompensated cirrhosis        HPI:  69M with PMH of alcoholic cirrhosis, esophageal varices, Afib on eliquis, and HTN who presents for c/o worsening diffuse swelling as well as R arm pain/erythema. He was recently hospitalized 1 month ago at [facility] for volume overload in the setting of decompensated cirrhosis. He was treated with IV diuresis and discharged with adjustment to his diuretics, currently on lasix 60mg BID and metolazone 2.5mg every other week as per family. Patient reports diffuse swelling of his upper and lower extremities in addition to distended abdomen and worsening dyspnea, which he believes is due to recent medication adjustment. Family states he is non-compliant with low sodium diet and fluid restriction. Family states he has been compliant with diuretics, but rom't taken eliquis for 3 days due to issue getting refill. He also reports scratching right arm on door frame 3-4 days ago which has become red and painful after wrapping in in bandage. He claims to be compliant with medications, but is a poor historian. He follows with hepatology, not currently active on transplant list. He states he hasn't consumed alcohol for at least 9 months. Has c/o chills, but denies fever, cough, nausea, vomiting, chest pain, dysuria, hematuria, blood in stool. Workup in ED remarkable for VS: T 97.6 HR 94 RR 22 BP 95/56 O2 sat 97% on RA. Hb 6.7, MCV 75, Plt 81, PT/INR 22.6/2.2, Na 128, K 6.1, BUN/Cr 49/5.7, Alb 2.8, AST/ALT 35/9, Ammonia 104, , Trop neg, LA 2.9, CXR: Cardiac size is enlarged similar to prior.  No large volume of pleural fluid noted although there is mild blunting of the  right costophrenic angle which may relate to a small amount of pleural fluid.  Suspected right basilar opacity, to be correlated clinically for infection. RUE venous doppler: No thrombus in central veins of the right upper extremity. Patient received vanc/zosyn and lasix 80mg IVP in ED and will be admitted to hospital medicine service for further management.    Overview/Hospital Course:  69 y.o. male with history of EtOH use disorder, EtOH cirrhosis, HCC s/p y90, morbid obesity BMI 44, HTN, and Afib who presents with severe anasarca and JAE.      Appreciate hepatology and nephrology recommendations.  Diuretics were held because of concern for HRS.  Patient was started on albumin and midodrine per Nephrology recommendations for HRS.  Renal function continued to worsen and recommendation per Nephrology to transfer to ICU for maintaining goal map over 85 on Levophed.  ICU was notified and patient to be assessed to be transferred to ICU.     Patient also has Gram-positive cocci and Gram-negative rods in his blood now.  Possible sources could be got translocation and barrier related infection through skin as he has been significantly edematous and has been oozing.  Has been on vancomycin and Rocephin.  Id was consulted this morning.  Repeat blood cultures were obtained.     Continues to have anemia.  Hemoglobin dropped on 11/20 and was given 1 unit of packed red blood cells.  Did not respond appropriately.  Hemoglobin dropped again to 6.8 on 11/21 and was given 1 unit of packed red blood cells.  Hemoglobin again on 11/22 is 7.2.  No reported melena or hematochezia.  Patient was on Eliquis for atrial fibrillation prior to admission which was held.  Given history of esophageal varices and portal hypertensive gastropathy whereas also could be component of slow bleeding, under production due to CKD and hemolysis while also with decompensated cirrhosis.  Started on Protonix IV b.i.d. and octreotide.     Also clarified with  hepatology.  Given patient's current infection we will await ID recommendations however will be challenging to consider transplant evaluation at this time.  Current concerns regarding transplant include higher BMI, frailty / debility.      Goal clarification with the patient and family.  Patient at this time wants to be full code and continue with Levophed in ICU.  They would consider discussion with palliative care in a day or so if things do not get better.      In MICU patient started on Levophed, however titration remained difficult given tachycardic response from the patient.      11/24 Trialysis and arterial lines placed overnight and currently on levo and norepi. SLED today.     11/25 Boo dc. Increased midodrine. Continue steroids until d/c pressors. Discussed with Nephrology about our concern for sustained use of pressors to achieve their MAP goals of 85. Will follow up tomorrow.      11/26 Platelets 48. Repeat 38 confirming thrombocytopenia. Concern for HIT. D/c heparin. Increased lactulose dosing. Bladder scan revealed urine in bladder. Boo placed. Off vaso. Continuing levo. Maintain MAP goals 80. PT/OT consulted.      11/27 MAP goal 75-80. Heparin antibody result pending.      11/28 Pt with abdominal pain, decreased bowel movements, emesis. Lactic acid 2.9 and repeat 2.7. Less concerned for mesenteric ischemia and more of a concern was for SBO. NG tube placed with subsequent suction of 500mL fluid. Abdomen less distended after placement and pt subsequently had bowel movement. MAP Goal of 60 with plan to come off pressors entirely and switch to dialysis after permacath, as he is not  liver transplant eligible at this time.      11/29 Pt with ileus. Clear liquids for now. Platelets 24. HIT versus zosyn induced? Peripheral smear sent. Zosyn changed to kaylah. Consent and transfuse 1U. GOC conversation today. Pt opting for long term dialysis.      11/30 naeon. Started back on heparin. One time dose of 120mg  lasix today. Hold off SLED/SCUF today and give IV lasix 120 mg once; plan for SLED/SCUF tomorrow      12/1 Patient is becoming more dependent on dialysis. Not a current liver transplant candidate. Still complaining of abdominal pain after discontinuing the NGT. AXR with evidence of dilated bowel loops. Brown bomb administered. The days following administration of the brown bomb, patient had more frequent bowel movements and was able to pass flatulence. Constipation eventually resolved.   Given the need for pressor support, he was worked up for adrenal insufficiency which was positive. Consequently, he was started on steroids for adrenal insufficiency. His blood pressures improved, however, he still required pressor support, particularly during dialysis. Pressor support was eventually weaned off. Patient was started on midodrine to help with BP. Nephrology trialed intermittent HD and patient seemed to tolerate it well. Nephrology recommended placement of tunneled catheter for HD. Patient was medically stable for step down to the hospital medicine floors.     Patient underwent testing with cosyntropin stimulation test, results are not compatible with adrenal insufficiency, cont hydrocortisone taper. Patient tolerating HD. IR placed RIJ TDC 12/16. Discharge planning to SNF.    12/19- patient was walking to the bathroom and had a mechanical fall with a his foot stumbling and hit his face of the ledge in the bathroom.  Although he walked with a walker and had nursing staff to help however could not be controlled.  Small contusion of the right glabella and the nose bridge.  CT head without any acute fractures.  Also noting abdominal distention.  We will request a paracentesis.  CT abdomen without any concern for hematoma.    12/20- accepted for nursing facility however awaiting for hemodialysis chair.  Given this is a new  admission possible anticipated discharge on Monday per SNF  and requesting attempts to see if  systolics can improve > 110.  we will lower his Lopressor if possible.  We will hold his Flomax.     12/21- SNF admission on Monday anticipated.     Interval History:     NAEON. Forehead hematoma improved.     accepted for nursing facility however awaiting for hemodialysis chair. Given this is a new admission possible anticipated discharge on Monday per SNF and requesting attempts to see if systolics can improve > 110. we will lower his Lopressor if possible. We will hold his Flomax.         Review of Systems   Constitutional:  Negative for chills and fever.   HENT:  Negative for nosebleeds.    Respiratory:  Negative for cough, chest tightness and shortness of breath.    Cardiovascular:  Positive for leg swelling.   Gastrointestinal:  Positive for abdominal distention and diarrhea. Negative for abdominal pain and vomiting.   Genitourinary:  Positive for decreased urine volume, difficulty urinating and scrotal swelling.   Musculoskeletal:  Negative for neck pain.   Skin:  Positive for pallor and wound.   Neurological:  Negative for dizziness, syncope, light-headedness and headaches.   Psychiatric/Behavioral:  Negative for confusion.    All other systems reviewed and are negative.    Objective:     Vital Signs (Most Recent):  Temp: 98.1 °F (36.7 °C) (12/21/24 1603)  Pulse: 86 (12/21/24 1603)  Resp: 18 (12/21/24 1603)  BP: (!) 108/51 (12/21/24 1603)  SpO2: 98 % (12/21/24 1603) Vital Signs (24h Range):  Temp:  [97.6 °F (36.4 °C)-98.1 °F (36.7 °C)] 98.1 °F (36.7 °C)  Pulse:  [86-94] 86  Resp:  [18] 18  SpO2:  [97 %-99 %] 98 %  BP: ()/(51-70) 108/51   Weight: (!) 142.9 kg (315 lb)  Body mass index is 41.56 kg/m².      Intake/Output Summary (Last 24 hours) at 12/21/2024 3285  Last data filed at 12/21/2024 0610  Gross per 24 hour   Intake 400 ml   Output 300 ml   Net 100 ml          Physical Exam  Vitals and nursing note reviewed.   Constitutional:       General: He is awake. He is not in acute distress.      Appearance: He is obese. He is ill-appearing. He is not toxic-appearing.   HENT:      Head:      Comments: Contusion and small bump around right glabella.      Mouth/Throat:      Comments: Lower lip with mild injury, mild oozing   Eyes:      Extraocular Movements: Extraocular movements intact.      Conjunctiva/sclera: Conjunctivae normal.   Cardiovascular:      Rate and Rhythm: Normal rate.   Pulmonary:      Effort: Pulmonary effort is normal. No respiratory distress.   Abdominal:      General: There is distension.      Palpations: Abdomen is soft.      Tenderness: There is no abdominal tenderness. There is no guarding or rebound.   Musculoskeletal:         General: No swelling or tenderness.      Right lower leg: Edema present.      Left lower leg: Edema present.      Comments: 2+ pitting edema in bilateral lower extremities   Skin:     General: Skin is warm.      Coloration: Skin is not jaundiced.      Findings: Bruising present.   Neurological:      Mental Status: He is alert and oriented to person, place, and time.      Motor: No weakness.   Psychiatric:         Behavior: Behavior normal.            Significant Labs:    CBC/Anemia Profile:  Recent Labs   Lab 12/20/24  0641 12/21/24  0615   WBC 6.01 6.11   HGB 7.9* 7.8*   HCT 24.1* 23.8*   PLT 78* 89*   MCV 83 84   RDW 27.7* 27.6*        Chemistries:  Recent Labs   Lab 12/20/24  0641 12/21/24  0615   * 130*   K 3.4* 3.3*    101   CO2 19* 21*   BUN 22 28*   CREATININE 3.5* 4.1*   CALCIUM 8.5* 8.5*   ALBUMIN 2.2* 2.1*   PROT 5.6* 5.5*   BILITOT 3.5* 3.0*   ALKPHOS 91 102   ALT 21 21   AST 39 37   PHOS 4.1 3.7       All pertinent labs within the past 24 hours have been reviewed.    Significant Imaging:  I have reviewed all pertinent imaging results/findings within the past 24 hours.    Assessment and Plan     * Decompensated cirrhosis  Etoh Cirrhosis  Hx of HCC s/p Y90    MELD 3.0: 31 at 12/21/2024  6:15 AM  MELD-Na: 31 at 12/21/2024  6:15 AM  Calculated  from:  Serum Creatinine: On dialysis. Using the maximum value.  Serum Sodium: 130 mmol/L at 12/21/2024  6:15 AM  Total Bilirubin: 3 mg/dL at 12/21/2024  6:15 AM  Serum Albumin: 2.1 g/dL at 12/21/2024  6:15 AM  INR(ratio): 1.5 at 12/21/2024  6:15 AM  Age at listing (hypothetical): 69 years  Sex: Male at 12/21/2024  6:15 AM    Cont lactulose    H/O HCC: treated with Y90 in June 2023.Last MRI abd w/o evidence of residual or recurrent disease 3/24/24. Due for repeat surveillance MRI as an outpatient.    Appreciate hepatology recommendations.   Current concerns regarding transplant include higher BMI, frailty / debility.       Discharge planning to SNF       Morbid obesity  Body mass index is 40.08 kg/m². Morbid obesity complicates all aspects of disease management from diagnostic modalities to treatment. Weight loss encouraged and health benefits explained to patient.         Adrenal insufficiency  On going problem since admission, most likely multifactorial - component of liver dysfunction, HRS, sepsis on admission.  S/p pressors in ICU     Random cortisol was 6   Persistent hypotension thought to be adrenal insufficiency. Patient underwent testing with cosyntropin stimulation test, results are not compatible with adrenal insufficiency. appreciate endocrinology recommendations   Cont hydrocortisone taper      Moderate protein-calorie malnutrition  Nutrition consulted. Most recent weight and BMI monitored-     Measurements:  Wt Readings from Last 1 Encounters:   12/13/24 (!) 137.8 kg (303 lb 12.7 oz)   Body mass index is 40.08 kg/m².    Patient has been screened and assessed by RD.    Malnutrition Type:  Context: acute illness or injury  Level: moderate    Malnutrition Characteristic Summary:  Energy Intake (Malnutrition): less than 75% for greater than 7 days  Subcutaneous Fat (Malnutrition): mild depletion  Muscle Mass (Malnutrition): mild depletion  Fluid Accumulation (Malnutrition): moderate to  severe    Interventions/Recommendations (treatment strategy):  1.)      Hypotension        Edema  Volume mgmt with RRT      Debility  Patient with Acute on chronic debility due to chronic unspecified fatigue, age-related physical debility, and other reduced mobility. The patient's latest AMPAC (Activity Measure for Post Acute Care) Score is listed below.    AM-PAC Score - How much help does the patient need for each activity listed  Basic Mobility Total Score: 17  Turning over in bed (including adjusting bedclothes, sheets and blankets)?: A little  Sitting down on and standing up from a chair with arms (e.g., wheelchair, bedside commode, etc.): A little  Moving from lying on back to sitting on the side of the bed?: A little  Moving to and from a bed to a chair (including a wheelchair)?: A little  Need to walk in hospital room?: A little  Climbing 3-5 steps with a railing?: A lot    Plan  - Progressive mobility protocol initated  - PT/OT consulted  - Fall precautions in place  - PT/OT recommending LI therapy. Planning for SNF      Advanced care planning/counseling discussion        Palliative care encounter  Goals of care, counseling/discussion  Advance Care Planning  Patient requests FULL CODE status and would like to continue current treatment      Abdominal pain        Gram-negative bacteremia  Blood cultures from admission with B. Diminuta, micrococcus luteus, lysinobacillus species. Seen by ID previously who recommended stopping antibiotics due to concern these were contaminants. Repeat blood cultures have been no growth. Has since been transferred to ICU with increased pressor requirement. Blood cultures repeated on 11/24 are no growth x 24 hours.   11/29 Switched from zosyn to meropenem due to concern of thrombocytopenia.   Finished meropenem course 12/8 @ 6 PM    Encephalopathy, metabolic  2/2 decompensated cirrhosis and JAE  Resolved    Hyponatremia  Hyponatremia is likely due to Cirrhosis. The patient's  most recent sodium results are listed below.  Recent Labs     12/19/24  0630 12/20/24  0641 12/21/24  0615   * 130* 130*       Plan  - Correct the sodium by 4-6mEq in 24 hours.   - Appreciate nephrology recommendations  - Monitor sodium daily  - Patient hyponatremia is stable  - Mgmt with HD    Thrombocytopenia  The likely etiology of thrombocytopenia is liver disease. The patients 3 most recent labs are listed below.  Recent Labs     12/19/24  0630 12/20/24  0641 12/21/24  0615   PLT 61* 78* 89*       Plan  - Will transfuse if platelet count is <50k (if undergoing surgical procedure or have active bleeding).      Microcytic anemia  Anemia is likely due to chronic disease due to Chronic liver disease. Most recent hemoglobin and hematocrit are listed below.  Recent Labs     12/19/24  0630 12/20/24  0641 12/21/24  0615   HGB 8.0* 7.9* 7.8*   HCT 24.8* 24.1* 23.8*       Plan  - Monitor serial CBC: Daily  - Transfuse PRBC if patient becomes hemodynamically unstable, symptomatic or H/H drops below 7/21.  - Patient has received 1 units of PRBCs on 11/20, 11/21, 12/15  - Patient's anemia is currently stable  - Hb baseline 7-8. No obvious bleeding  - Eliquis held on admission.  DVT prophylaxis held.    Stage 3a chronic kidney disease  ESRD on HD  Nephrology following  Will try to remove more fluid  Strict intake and output  Renally dose all medications  Avoid nephrotoxic agents  IR placed RIJ TDC 12/16  CM assisting with finding HD chair      JAE (acute kidney injury)  Baseline creatinine is  1.5 . Most recent creatinine and eGFR are listed below.    Recent Labs     12/19/24  0630 12/20/24  0641 12/21/24  0615   CREATININE 3.1* 3.5* 4.1*   EGFRNORACEVR 20.7* 17.9* 14.8*        Plan  - JAE is worsening. Will continue current treatment  - Avoid nephrotoxins and renally dose meds for GFR listed above  - Monitor urine output, serial BMP, and adjust therapy as needed    - Etiology includes volume overload, obstruction,  hypotension, possible HRS  Baseline creatinine is 1.5. May require dialysis long term.   Patient tolerated intermittent HD well (12/12)    IR placed RIJ TDC 12/16. Working to get HD chair.    Atrial fibrillation  Patient has persistent (7 days or more) atrial fibrillation. Patient is currently in atrial fibrillation. [id number] Score: 1. The patients heart rate in the last 24 hours is as follows:  Pulse  Min: 75  Max: 93       Plan  - Patient's afib is currently controlled  Holding home Eliquis in the setting of recent low blood counts, consider continue holding on DC .. Will discuss with patient regarding risks and benefits  Continue metoprolol 12.5 mg BID  - percy ttempt to change to liquid at 6.25 BID if possible and assess HR    Coagulopathy  2/2 cirrhosis. See plan below    Recent Labs   Lab 12/21/24  0615   INR 1.5*     Received 3 doses of IV vitamin K.  End Stage Liver Disease precipitated coagulopathy  Heparin d/c d/t thrombocytopenia          Goals of care, counseling/discussion  Advance Care Planning    Code Status  In light of the patients advanced and life limiting illness,I engaged the the patient and family in a voluntary conversation about the patient's preferences for care  at the very end of life. The patient wishes to have a natural, peaceful death.  Along those lines, the patient wishes to have CPR or other invasive treatments performed when his heart and/or breathing stops. I communicated to the patient and family. Continue FULL CODE.    A total of 15 min was spent on advance care planning, goals of care discussion, emotional support, formulating and communicating prognosis and exploring burden/benefit of various approaches of treatment. This discussion occurred on a fully voluntary basis with the verbal consent of the patient and/or family.           VTE Risk Mitigation (From admission, onward)           Ordered     Place sequential compression device  Until discontinued         11/22/24 5258      IP VTE HIGH RISK PATIENT  Once         11/20/24 1622                    Discharge Planning   AUTUMN: 12/23/2024     Code Status: Full Code   Medical Readiness for Discharge Date:   Discharge Plan A: Skilled Nursing Facility   Discharge Delays: (!) Dialysis Set-up            Please place Justification for DME        Glory Oliva MD  Department of Hospital Medicine   [facility] - Transplant Stepdown

## 2024-12-21 NOTE — ASSESSMENT & PLAN NOTE
Patient has persistent (7 days or more) atrial fibrillation. Patient is currently in atrial fibrillation. [id number] Score: 1. The patients heart rate in the last 24 hours is as follows:  Pulse  Min: 75  Max: 93       Plan  - Patient's afib is currently controlled  Holding home Eliquis in the setting of recent low blood counts, consider continue holding on DC .. Will discuss with patient regarding risks and benefits  Continue metoprolol 12.5 mg BID  - percy ttempt to change to liquid at 6.25 BID if possible and assess HR

## 2024-12-21 NOTE — PLAN OF CARE
Problem: Fall Injury Risk  Goal: Absence of Fall and Fall-Related Injury  Outcome: Progressing     Problem: Liver Failure  Goal: Optimal Coping with Liver Failure  Outcome: Progressing  Goal: Fluid and Electrolyte Balance  Outcome: Progressing  Goal: Optimal Pain Control, Comfort and Function  Outcome: Progressing  Goal: Effective Oxygenation and Ventilation  Outcome: Progressing

## 2024-12-21 NOTE — SUBJECTIVE & OBJECTIVE
Interval History:     NAEON. Forehead hematoma improved.     accepted for nursing facility however awaiting for hemodialysis chair. Given this is a new admission possible anticipated discharge on Monday per SNF and requesting attempts to see if systolics can improve > 110. we will lower his Lopressor if possible. We will hold his Flomax.         Review of Systems   Constitutional:  Negative for chills and fever.   HENT:  Negative for nosebleeds.    Respiratory:  Negative for cough, chest tightness and shortness of breath.    Cardiovascular:  Positive for leg swelling.   Gastrointestinal:  Positive for abdominal distention and diarrhea. Negative for abdominal pain and vomiting.   Genitourinary:  Positive for decreased urine volume, difficulty urinating and scrotal swelling.   Musculoskeletal:  Negative for neck pain.   Skin:  Positive for pallor and wound.   Neurological:  Negative for dizziness, syncope, light-headedness and headaches.   Psychiatric/Behavioral:  Negative for confusion.    All other systems reviewed and are negative.    Objective:     Vital Signs (Most Recent):  Temp: 98.1 °F (36.7 °C) (12/21/24 1603)  Pulse: 86 (12/21/24 1603)  Resp: 18 (12/21/24 1603)  BP: (!) 108/51 (12/21/24 1603)  SpO2: 98 % (12/21/24 1603) Vital Signs (24h Range):  Temp:  [97.6 °F (36.4 °C)-98.1 °F (36.7 °C)] 98.1 °F (36.7 °C)  Pulse:  [86-94] 86  Resp:  [18] 18  SpO2:  [97 %-99 %] 98 %  BP: ()/(51-70) 108/51   Weight: (!) 142.9 kg (315 lb)  Body mass index is 41.56 kg/m².      Intake/Output Summary (Last 24 hours) at 12/21/2024 1731  Last data filed at 12/21/2024 0610  Gross per 24 hour   Intake 400 ml   Output 300 ml   Net 100 ml          Physical Exam  Vitals and nursing note reviewed.   Constitutional:       General: He is awake. He is not in acute distress.     Appearance: He is obese. He is ill-appearing. He is not toxic-appearing.   HENT:      Head:      Comments: Contusion and small bump around right glabella.       Mouth/Throat:      Comments: Lower lip with mild injury, mild oozing   Eyes:      Extraocular Movements: Extraocular movements intact.      Conjunctiva/sclera: Conjunctivae normal.   Cardiovascular:      Rate and Rhythm: Normal rate.   Pulmonary:      Effort: Pulmonary effort is normal. No respiratory distress.   Abdominal:      General: There is distension.      Palpations: Abdomen is soft.      Tenderness: There is no abdominal tenderness. There is no guarding or rebound.   Musculoskeletal:         General: No swelling or tenderness.      Right lower leg: Edema present.      Left lower leg: Edema present.      Comments: 2+ pitting edema in bilateral lower extremities   Skin:     General: Skin is warm.      Coloration: Skin is not jaundiced.      Findings: Bruising present.   Neurological:      Mental Status: He is alert and oriented to person, place, and time.      Motor: No weakness.   Psychiatric:         Behavior: Behavior normal.            Significant Labs:    CBC/Anemia Profile:  Recent Labs   Lab 12/20/24  0641 12/21/24  0615   WBC 6.01 6.11   HGB 7.9* 7.8*   HCT 24.1* 23.8*   PLT 78* 89*   MCV 83 84   RDW 27.7* 27.6*        Chemistries:  Recent Labs   Lab 12/20/24  0641 12/21/24  0615   * 130*   K 3.4* 3.3*    101   CO2 19* 21*   BUN 22 28*   CREATININE 3.5* 4.1*   CALCIUM 8.5* 8.5*   ALBUMIN 2.2* 2.1*   PROT 5.6* 5.5*   BILITOT 3.5* 3.0*   ALKPHOS 91 102   ALT 21 21   AST 39 37   PHOS 4.1 3.7       All pertinent labs within the past 24 hours have been reviewed.    Significant Imaging:  I have reviewed all pertinent imaging results/findings within the past 24 hours.

## 2024-12-21 NOTE — ASSESSMENT & PLAN NOTE
Hyponatremia is likely due to Cirrhosis. The patient's most recent sodium results are listed below.  Recent Labs     12/18/24  0524 12/19/24  0630 12/20/24  0641   * 132* 130*       Plan  - Correct the sodium by 4-6mEq in 24 hours.   - Appreciate nephrology recommendations  - Monitor sodium daily  - Patient hyponatremia is stable  - Mgmt with HD

## 2024-12-21 NOTE — TREATMENT PLAN
Hepatology Progress Note    Juan Carlos Yoo Sr. is a 69 y.o. male admitted to hospital 11/20/2024 (Hospital Day: 32) due to Decompensated cirrhosis.     Interval History    Plan for discharge on Monday, 12/23 to SNF with HD chair.     Objective  Temp:  [97.6 °F (36.4 °C)-98.1 °F (36.7 °C)] 97.9 °F (36.6 °C) (12/21 1140)  Pulse:  [88-94] 94 (12/21 1140)  BP: ()/(53-70) 97/61 (12/21 1140)  Resp:  [18-20] 18 (12/21 1140)  SpO2:  [96 %-99 %] 99 % (12/21 1140)    Laboratory    Lab Results   Component Value Date    WBC 6.11 12/21/2024    HGB 7.8 (L) 12/21/2024    HCT 23.8 (L) 12/21/2024    MCV 84 12/21/2024    PLT 89 (L) 12/21/2024       Lab Results   Component Value Date     (L) 12/21/2024    K 3.3 (L) 12/21/2024     12/21/2024    CO2 21 (L) 12/21/2024    BUN 28 (H) 12/21/2024    CREATININE 4.1 (H) 12/21/2024    CALCIUM 8.5 (L) 12/21/2024       Lab Results   Component Value Date    ALBUMIN 2.1 (L) 12/21/2024    ALT 21 12/21/2024    AST 37 12/21/2024    GGT 30 12/13/2022    ALKPHOS 102 12/21/2024    BILITOT 3.0 (H) 12/21/2024       Lab Results   Component Value Date    INR 1.5 (H) 12/21/2024    INR 1.8 (H) 12/20/2024    INR 1.6 (H) 12/19/2024       MELD 3.0: 31 at 12/21/2024  6:15 AM  MELD-Na: 31 at 12/21/2024  6:15 AM  Calculated from:  Serum Creatinine: On dialysis. Using the maximum value.  Serum Sodium: 130 mmol/L at 12/21/2024  6:15 AM  Total Bilirubin: 3 mg/dL at 12/21/2024  6:15 AM  Serum Albumin: 2.1 g/dL at 12/21/2024  6:15 AM  INR(ratio): 1.5 at 12/21/2024  6:15 AM  Age at listing (hypothetical): 69 years  Sex: Male at 12/21/2024  6:15 AM      Assessment  Juan Carlos Yoo Sr. is a 69 y.o. male with history of EtOH use disorder, EtOH cirrhosis, HCC s/p y90, morbid obesity BMI 44, HTN, and Afib who presents with severe anasarca and JAE. Known history of cirrhosis and is established patient of Dr. Daniels. Was previously evaluated for transplant and was declined due to persistently positive PETHs in the  past and also due to concern over BMI. Since May 2023 PETH has been negative, though patient reports he has not drank alcohol for the last 6 months at least. Presents now with decompensated cirrhosis and with significant renal injury with Cr 5.7 up from 1.5 a month prior. Unclear etiology of cirrhosis decompensation. He does report non-adherence with fluid restriction and volume overload could be contributing; unclear if patient has been taking medications appropriately given his poor insight. Also presents with marked renal dysfunction which is new from 1 month prior, and worsening renal function which is concerning for HRS. No prior known history of CHF and last TTE in 2022 with normal systolic function. Patient does report changes to his diuretic regimen at home recently that were made while he was at LTAC and is not sure if these changes were helping him keep fluid off. No LTAC records available, but it seems that patient had home lasix increased to 60mg BID and had metolazone 2.5 every other week added. Patient with fairly limited insight into his medical conditions and his medications, and has poor social support at home with only his son nearby who is a single father. PETH from 11/20 negative (last positive 2/2023).  Blood cultures from admission with B. Diminuta, micrococcus luteus, lysinobacillus species. ID consulted, on vancomycin and zosyn. Nephrology following for JAE; has been on SLED. Etiology of JAE likely HRS, has been in the MICU on pressor support. Pressors have weaned down but still requires them for SLED. PT/OT recommending moderate intensity therapy. On 11/28, abdomen was diffusely distended and he had bilious emesis. XRAY abdomen concerning for ileus vs obstruction. NGT placed with improvement in symptoms, removed on 11/30 and diet advanced to clear liquids. Started on hydrocortisone for suspected AI but endocrine does not suspect AI and has started tapering steroids. He was weaned off  pressors, tolerated HD and stepped down to Hospital Medicine.      Problem List:  Decompensated cirrhosis with ascites  Acute renal failure, concerning for HRS/ATN, now dialysis dependent   Severe obesity, BMI 43  Hx of EtOH Use disorder  Hx of HCC s/p Y90  Shock/Adrenal Insufficiency    Ileus- resolved      Recommendations:  - As of now, debility remains a prohibiting factor for transplant evaluation. Plan for discharge on Monday, 12/23 to SNF with HD chair.   - S/p tunneled HD line on 12/16. HD per nephrology.   - Please obtain daily CBC, CMP, PT/INR.  - We will arrange for labs in 1 week following discharge and follow up with Dr. Cota in hepatology clinic in 8 weeks.   - Hepatology will sign off at this time.     Thank you for involving us in the care of Juan Carlos Yoo Sr.. Please call with any additional concerns or questions.    Shira Hernandez,   Gastroenterology PGY- V  [facility]

## 2024-12-21 NOTE — NURSING
-AAOx4, VSS, afebrile, on room air  -Pt went for a paracentesis in IR, no fluid removed  -No complaints of pain or discomfort at this time  -Plan to discharge to SNF  -Bed low and locked with call light in reach

## 2024-12-21 NOTE — ASSESSMENT & PLAN NOTE
Baseline creatinine is  1.5 . Most recent creatinine and eGFR are listed below.    Recent Labs     12/18/24  0524 12/19/24  0630 12/20/24  0641   CREATININE 3.5* 3.1* 3.5*   EGFRNORACEVR 17.9* 20.7* 17.9*        Plan  - JAE is worsening. Will continue current treatment  - Avoid nephrotoxins and renally dose meds for GFR listed above  - Monitor urine output, serial BMP, and adjust therapy as needed    - Etiology includes volume overload, obstruction, hypotension, possible HRS  Baseline creatinine is 1.5. May require dialysis long term.   Patient tolerated intermittent HD well (12/12)    IR placed RIJ TDC 12/16. Working to get HD chair.

## 2024-12-21 NOTE — ASSESSMENT & PLAN NOTE
Anemia is likely due to chronic disease due to Chronic liver disease. Most recent hemoglobin and hematocrit are listed below.  Recent Labs     12/18/24  0524 12/19/24  0630 12/20/24  0641   HGB 8.4* 8.0* 7.9*   HCT 25.8* 24.8* 24.1*       Plan  - Monitor serial CBC: Daily  - Transfuse PRBC if patient becomes hemodynamically unstable, symptomatic or H/H drops below 7/21.  - Patient has received 1 units of PRBCs on 11/20, 11/21, 12/15  - Patient's anemia is currently stable  - Hb baseline 7-8. No obvious bleeding  - Eliquis held on admission.  DVT prophylaxis held.

## 2024-12-21 NOTE — ASSESSMENT & PLAN NOTE
Hyponatremia is likely due to Cirrhosis. The patient's most recent sodium results are listed below.  Recent Labs     12/19/24  0630 12/20/24  0641 12/21/24  0615   * 130* 130*       Plan  - Correct the sodium by 4-6mEq in 24 hours.   - Appreciate nephrology recommendations  - Monitor sodium daily  - Patient hyponatremia is stable  - Mgmt with HD

## 2024-12-21 NOTE — PT/OT/SLP PROGRESS
Physical Therapy Treatment    Patient Name:  Juan Carlos Yoo Sr.   MRN:  [id number]    Recommendations:     Discharge Recommendations: Moderate Intensity Therapy  Discharge Equipment Recommendations: wheelchair, walker, rolling, bedside commode, grab bar, bath bench  Barriers to discharge: None    Assessment:     Juan Carlos Yoo Sr. is a 69 y.o. male admitted with a medical diagnosis of Decompensated cirrhosis.  He presents with the following impairments/functional limitations: weakness, impaired endurance, impaired self care skills, impaired functional mobility, decreased safety awareness, gait instability, decreased lower extremity function, decreased upper extremity function, impaired cardiopulmonary response to activity, impaired skin, edema Pt tolerated treatment session well today. Pt requiring little to no assistance for bed mobility, transfers and gait training. Patient remains appropriate for continued skilled services within the acute environment and goals remain appropriate.   .    Rehab Prognosis: Good; patient would benefit from acute skilled PT services to address these deficits and reach maximum level of function.    Recent Surgery: * No surgery found *      Plan:     During this hospitalization, patient to be seen 4 x/week to address the identified rehab impairments via gait training, therapeutic activities, therapeutic exercises, neuromuscular re-education and progress toward the following goals:    Plan of Care Expires:  12/27/24    Subjective     Chief Complaint: None stated   Patient/Family Comments/goals: Pt agreeable to PT   Pain/Comfort:  Pain Rating 1: 0/10      Objective:     Communicated with Rn prior to session.  Patient found supine with Trialysis upon PT entry to room.     General Precautions: Standard, fall  Orthopedic Precautions: N/A  Braces: N/A  Respiratory Status: Room air     Functional Mobility:  Bed Mobility:     Supine to Sit: contact guard assistance    Transfers:     Sit to Stand:   CGA and [place] from chair with rolling walker  Gait: 44 ft CGA with RW     Pt performed 10 repetitions of seated B LE exercises consisting of: Marching, LAQ, ABD/ADD, heel raises, and toe raises.         AM-PAC 6 CLICK MOBILITY  Turning over in bed (including adjusting bedclothes, sheets and blankets)?: 3  Sitting down on and standing up from a chair with arms (e.g., wheelchair, bedside commode, etc.): 3  Moving from lying on back to sitting on the side of the bed?: 3  Moving to and from a bed to a chair (including a wheelchair)?: 3  Need to walk in hospital room?: 3  Climbing 3-5 steps with a railing?: 2  Basic Mobility Total Score: 17       Treatment & Education:  Therapist provided instruction and educated for safety during transfers and gait training. As well as proper body mechanics, energy conservation, and fall prevention strategies during tasks listed above, and the effects of prolonged immobility and the importance of performing EOB/OOB activity and exercises to promote healing and reduce recovery time.       Patient left up in chair with all lines intact, call button in reach, and Rn notified..    GOALS:   Multidisciplinary Problems       Physical Therapy Goals          Problem: Physical Therapy    Goal Priority Disciplines Outcome Interventions   Physical Therapy Goal     PT, PT/OT Progressing    Description: Goals to be met by: 24     Patient will increase functional independence with mobility by performin. Supine to sit with Minimal Assistance  2. Sit to stand transfer with Stand By Assistance  3. Bed to chair transfer with Stand By Assistance using LRAD  4. Gait  x 150 feet with Stand By Assistance using No LRAD.   5. Pt sit on EOB x 15 min with SBA and perform functional activities to increased functional mobility.                          Time Tracking:     PT Received On: 24  PT Start Time: 1049     PT Stop Time: 1116  PT Total Time (min): 27 min     Billable Minutes: Gait Training  17 and Therapeutic Exercise 10    Treatment Type: Treatment  PT/PTA: PTA     Number of PTA visits since last PT visit: 1     12/21/2024

## 2024-12-21 NOTE — PROGRESS NOTES
[facility] Medicine  Progress Note    Patient Name: Juan Carlos Yoo Sr.  MRN: [id number]  Patient Class: IP- Inpatient   Admission Date: 11/20/2024  Length of Stay: 30 days  Attending Physician: Glory Oliva MD  Primary Care Provider: Nyla Rios FNP        Subjective     Principal Problem:Decompensated cirrhosis        HPI:  69M with PMH of alcoholic cirrhosis, esophageal varices, Afib on eliquis, and HTN who presents for c/o worsening diffuse swelling as well as R arm pain/erythema. He was recently hospitalized 1 month ago at [facility] for volume overload in the setting of decompensated cirrhosis. He was treated with IV diuresis and discharged with adjustment to his diuretics, currently on lasix 60mg BID and metolazone 2.5mg every other week as per family. Patient reports diffuse swelling of his upper and lower extremities in addition to distended abdomen and worsening dyspnea, which he believes is due to recent medication adjustment. Family states he is non-compliant with low sodium diet and fluid restriction. Family states he has been compliant with diuretics, but rom't taken eliquis for 3 days due to issue getting refill. He also reports scratching right arm on door frame 3-4 days ago which has become red and painful after wrapping in in bandage. He claims to be compliant with medications, but is a poor historian. He follows with hepatology, not currently active on transplant list. He states he hasn't consumed alcohol for at least 9 months. Has c/o chills, but denies fever, cough, nausea, vomiting, chest pain, dysuria, hematuria, blood in stool. Workup in ED remarkable for VS: T 97.6 HR 94 RR 22 BP 95/56 O2 sat 97% on RA. Hb 6.7, MCV 75, Plt 81, PT/INR 22.6/2.2, Na 128, K 6.1, BUN/Cr 49/5.7, Alb 2.8, AST/ALT 35/9, Ammonia 104, , Trop neg, LA 2.9, CXR: Cardiac size is enlarged similar to prior.  No large volume of pleural fluid noted although there is mild blunting of the  right costophrenic angle which may relate to a small amount of pleural fluid.  Suspected right basilar opacity, to be correlated clinically for infection. RUE venous doppler: No thrombus in central veins of the right upper extremity. Patient received vanc/zosyn and lasix 80mg IVP in ED and will be admitted to hospital medicine service for further management.    Overview/Hospital Course:  69 y.o. male with history of EtOH use disorder, EtOH cirrhosis, HCC s/p y90, morbid obesity BMI 44, HTN, and Afib who presents with severe anasarca and JAE.      Appreciate hepatology and nephrology recommendations.  Diuretics were held because of concern for HRS.  Patient was started on albumin and midodrine per Nephrology recommendations for HRS.  Renal function continued to worsen and recommendation per Nephrology to transfer to ICU for maintaining goal map over 85 on Levophed.  ICU was notified and patient to be assessed to be transferred to ICU.     Patient also has Gram-positive cocci and Gram-negative rods in his blood now.  Possible sources could be got translocation and barrier related infection through skin as he has been significantly edematous and has been oozing.  Has been on vancomycin and Rocephin.  Id was consulted this morning.  Repeat blood cultures were obtained.     Continues to have anemia.  Hemoglobin dropped on 11/20 and was given 1 unit of packed red blood cells.  Did not respond appropriately.  Hemoglobin dropped again to 6.8 on 11/21 and was given 1 unit of packed red blood cells.  Hemoglobin again on 11/22 is 7.2.  No reported melena or hematochezia.  Patient was on Eliquis for atrial fibrillation prior to admission which was held.  Given history of esophageal varices and portal hypertensive gastropathy whereas also could be component of slow bleeding, under production due to CKD and hemolysis while also with decompensated cirrhosis.  Started on Protonix IV b.i.d. and octreotide.     Also clarified with  hepatology.  Given patient's current infection we will await ID recommendations however will be challenging to consider transplant evaluation at this time.  Current concerns regarding transplant include higher BMI, frailty / debility.      Goal clarification with the patient and family.  Patient at this time wants to be full code and continue with Levophed in ICU.  They would consider discussion with palliative care in a day or so if things do not get better.      In MICU patient started on Levophed, however titration remained difficult given tachycardic response from the patient.      11/24 Trialysis and arterial lines placed overnight and currently on levo and norepi. SLED today.     11/25 Boo dc. Increased midodrine. Continue steroids until d/c pressors. Discussed with Nephrology about our concern for sustained use of pressors to achieve their MAP goals of 85. Will follow up tomorrow.      11/26 Platelets 48. Repeat 38 confirming thrombocytopenia. Concern for HIT. D/c heparin. Increased lactulose dosing. Bladder scan revealed urine in bladder. Boo placed. Off vaso. Continuing levo. Maintain MAP goals 80. PT/OT consulted.      11/27 MAP goal 75-80. Heparin antibody result pending.      11/28 Pt with abdominal pain, decreased bowel movements, emesis. Lactic acid 2.9 and repeat 2.7. Less concerned for mesenteric ischemia and more of a concern was for SBO. NG tube placed with subsequent suction of 500mL fluid. Abdomen less distended after placement and pt subsequently had bowel movement. MAP Goal of 60 with plan to come off pressors entirely and switch to dialysis after permacath, as he is not  liver transplant eligible at this time.      11/29 Pt with ileus. Clear liquids for now. Platelets 24. HIT versus zosyn induced? Peripheral smear sent. Zosyn changed to kaylah. Consent and transfuse 1U. GOC conversation today. Pt opting for long term dialysis.      11/30 naeon. Started back on heparin. One time dose of 120mg  lasix today. Hold off SLED/SCUF today and give IV lasix 120 mg once; plan for SLED/SCUF tomorrow      12/1 Patient is becoming more dependent on dialysis. Not a current liver transplant candidate. Still complaining of abdominal pain after discontinuing the NGT. AXR with evidence of dilated bowel loops. Brown bomb administered. The days following administration of the brown bomb, patient had more frequent bowel movements and was able to pass flatulence. Constipation eventually resolved.   Given the need for pressor support, he was worked up for adrenal insufficiency which was positive. Consequently, he was started on steroids for adrenal insufficiency. His blood pressures improved, however, he still required pressor support, particularly during dialysis. Pressor support was eventually weaned off. Patient was started on midodrine to help with BP. Nephrology trialed intermittent HD and patient seemed to tolerate it well. Nephrology recommended placement of tunneled catheter for HD. Patient was medically stable for step down to the hospital medicine floors.     Patient underwent testing with cosyntropin stimulation test, results are not compatible with adrenal insufficiency, cont hydrocortisone taper. Patient tolerating HD. IR placed RIJ TDC 12/16. Discharge planning to SNF.    12/19- patient was walking to the bathroom and had a mechanical fall with a his foot stumbling and hit his face of the ledge in the bathroom.  Although he walked with a walker and had nursing staff to help however could not be controlled.  Small contusion of the right glabella and the nose bridge.  CT head without any acute fractures.  Also noting abdominal distention.  We will request a paracentesis.  CT abdomen without any concern for hematoma.    12/20- accepted for nursing facility however awaiting for hemodialysis chair.  Given this is a new  admission possible anticipated discharge on Monday per SNF  and requesting attempts to see if  systolics can improve > 110.  we will lower his Lopressor if possible.  We will hold his Flomax.     Interval History:     NAEON. NO further oozing from lip injury. Forehead hematoma improved.     accepted for nursing facility however awaiting for hemodialysis chair. Given this is a new admission possible anticipated discharge on Monday per SNF and requesting attempts to see if systolics can improve > 110. we will lower his Lopressor if possible. We will hold his Flomax.         Review of Systems   Constitutional:  Negative for chills and fever.   HENT:  Negative for nosebleeds.    Respiratory:  Negative for cough, chest tightness and shortness of breath.    Cardiovascular:  Positive for leg swelling.   Gastrointestinal:  Positive for abdominal distention and diarrhea. Negative for abdominal pain and vomiting.   Genitourinary:  Positive for decreased urine volume, difficulty urinating and scrotal swelling.   Musculoskeletal:  Negative for neck pain.   Skin:  Positive for pallor and wound.   Neurological:  Negative for dizziness, syncope, light-headedness and headaches.   Psychiatric/Behavioral:  Negative for confusion.    All other systems reviewed and are negative.    Objective:     Vital Signs (Most Recent):  Temp: 98.1 °F (36.7 °C) (12/20/24 1556)  Pulse: 89 (12/20/24 1556)  Resp: 20 (12/20/24 1556)  BP: (!) 97/53 (12/20/24 1556)  SpO2: 96 % (12/20/24 1556) Vital Signs (24h Range):  Temp:  [97.7 °F (36.5 °C)-98.1 °F (36.7 °C)] 98.1 °F (36.7 °C)  Pulse:  [75-93] 89  Resp:  [17-20] 20  SpO2:  [96 %-100 %] 96 %  BP: ()/(53-72) 97/53   Weight: (!) 140.6 kg (310 lb)  Body mass index is 40.9 kg/m².      Intake/Output Summary (Last 24 hours) at 12/20/2024 6049  Last data filed at 12/20/2024 0511  Gross per 24 hour   Intake 480 ml   Output --   Net 480 ml          Physical Exam  Vitals and nursing note reviewed.   Constitutional:       General: He is awake. He is not in acute distress.     Appearance: He is obese.  He is ill-appearing. He is not toxic-appearing.   HENT:      Head:      Comments: Contusion and small bump around right glabella.      Mouth/Throat:      Comments: Lower lip with mild injury, mild oozing   Eyes:      Extraocular Movements: Extraocular movements intact.      Conjunctiva/sclera: Conjunctivae normal.   Cardiovascular:      Rate and Rhythm: Normal rate.   Pulmonary:      Effort: Pulmonary effort is normal. No respiratory distress.   Abdominal:      General: There is distension.      Palpations: Abdomen is soft.      Tenderness: There is no abdominal tenderness. There is no guarding or rebound.   Musculoskeletal:         General: No swelling or tenderness.      Right lower leg: Edema present.      Left lower leg: Edema present.      Comments: 2+ pitting edema in bilateral lower extremities   Skin:     General: Skin is warm.      Coloration: Skin is not jaundiced.      Findings: Bruising present.   Neurological:      Mental Status: He is alert and oriented to person, place, and time.      Motor: No weakness.   Psychiatric:         Behavior: Behavior normal.            Significant Labs:    CBC/Anemia Profile:  Recent Labs   Lab 12/19/24  0630 12/20/24  0641   WBC 6.15 6.01   HGB 8.0* 7.9*   HCT 24.8* 24.1*   PLT 61* 78*   MCV 83 83   RDW 27.1* 27.7*        Chemistries:  Recent Labs   Lab 12/19/24  0630 12/20/24  0641   * 130*   K 4.1 3.4*    102   CO2 22* 19*   BUN 17 22   CREATININE 3.1* 3.5*   CALCIUM 8.5* 8.5*   ALBUMIN 2.2* 2.2*   PROT 5.5* 5.6*   BILITOT 3.3* 3.5*   ALKPHOS 91 91   ALT 24 21   AST 41* 39   MG 2.0  --    PHOS 3.4 4.1       All pertinent labs within the past 24 hours have been reviewed.    Significant Imaging:  I have reviewed all pertinent imaging results/findings within the past 24 hours.    Assessment and Plan     * Decompensated cirrhosis  Etoh Cirrhosis  Hx of HCC s/p Y90    MELD 3.0: 33 at 12/20/2024  6:41 AM  MELD-Na: 33 at 12/20/2024  6:41 AM  Calculated from:  Serum  Creatinine: On dialysis. Using the maximum value.  Serum Sodium: 130 mmol/L at 12/20/2024  6:41 AM  Total Bilirubin: 3.5 mg/dL at 12/20/2024  6:41 AM  Serum Albumin: 2.2 g/dL at 12/20/2024  6:41 AM  INR(ratio): 1.8 at 12/20/2024  6:41 AM  Age at listing (hypothetical): 69 years  Sex: Male at 12/20/2024  6:41 AM    Cont lactulose    H/O HCC: treated with Y90 in June 2023.Last MRI abd w/o evidence of residual or recurrent disease 3/24/24. Due for repeat surveillance MRI as an outpatient.    Appreciate hepatology recommendations.   Current concerns regarding transplant include higher BMI, frailty / debility.       Discharge planning to SNF       Morbid obesity  Body mass index is 40.08 kg/m². Morbid obesity complicates all aspects of disease management from diagnostic modalities to treatment. Weight loss encouraged and health benefits explained to patient.         Adrenal insufficiency  On going problem since admission, most likely multifactorial - component of liver dysfunction, HRS, sepsis on admission.  S/p pressors in ICU     Random cortisol was 6   Persistent hypotension thought to be adrenal insufficiency. Patient underwent testing with cosyntropin stimulation test, results are not compatible with adrenal insufficiency. appreciate endocrinology recommendations   Cont hydrocortisone taper      Moderate protein-calorie malnutrition  Nutrition consulted. Most recent weight and BMI monitored-     Measurements:  Wt Readings from Last 1 Encounters:   12/13/24 (!) 137.8 kg (303 lb 12.7 oz)   Body mass index is 40.08 kg/m².    Patient has been screened and assessed by RD.    Malnutrition Type:  Context: acute illness or injury  Level: moderate    Malnutrition Characteristic Summary:  Energy Intake (Malnutrition): less than 75% for greater than 7 days  Subcutaneous Fat (Malnutrition): mild depletion  Muscle Mass (Malnutrition): mild depletion  Fluid Accumulation (Malnutrition): moderate to  severe    Interventions/Recommendations (treatment strategy):  1.)      Hypotension        Edema  Volume mgmt with RRT      Debility  Patient with Acute on chronic debility due to chronic unspecified fatigue, age-related physical debility, and other reduced mobility. The patient's latest AMPAC (Activity Measure for Post Acute Care) Score is listed below.    AM-PAC Score - How much help does the patient need for each activity listed  Basic Mobility Total Score: 17  Turning over in bed (including adjusting bedclothes, sheets and blankets)?: A little  Sitting down on and standing up from a chair with arms (e.g., wheelchair, bedside commode, etc.): A little  Moving from lying on back to sitting on the side of the bed?: A little  Moving to and from a bed to a chair (including a wheelchair)?: A little  Need to walk in hospital room?: A little  Climbing 3-5 steps with a railing?: A lot    Plan  - Progressive mobility protocol initated  - PT/OT consulted  - Fall precautions in place  - PT/OT recommending LI therapy. Planning for SNF      Advanced care planning/counseling discussion        Palliative care encounter  Goals of care, counseling/discussion  Advance Care Planning  Patient requests FULL CODE status and would like to continue current treatment      Abdominal pain        Gram-negative bacteremia  Blood cultures from admission with B. Diminuta, micrococcus luteus, lysinobacillus species. Seen by ID previously who recommended stopping antibiotics due to concern these were contaminants. Repeat blood cultures have been no growth. Has since been transferred to ICU with increased pressor requirement. Blood cultures repeated on 11/24 are no growth x 24 hours.   11/29 Switched from zosyn to meropenem due to concern of thrombocytopenia.   Finished meropenem course 12/8 @ 6 PM    Encephalopathy, metabolic  2/2 decompensated cirrhosis and JAE  Resolved    Hyponatremia  Hyponatremia is likely due to Cirrhosis. The patient's  most recent sodium results are listed below.  Recent Labs     12/18/24 0524 12/19/24  0630 12/20/24  0641   * 132* 130*       Plan  - Correct the sodium by 4-6mEq in 24 hours.   - Appreciate nephrology recommendations  - Monitor sodium daily  - Patient hyponatremia is stable  - Mgmt with HD    Thrombocytopenia  The likely etiology of thrombocytopenia is liver disease. The patients 3 most recent labs are listed below.  Recent Labs     12/18/24 0524 12/19/24 0630 12/20/24  0641   PLT 60* 61* 78*       Plan  - Will transfuse if platelet count is <50k (if undergoing surgical procedure or have active bleeding).      Microcytic anemia  Anemia is likely due to chronic disease due to Chronic liver disease. Most recent hemoglobin and hematocrit are listed below.  Recent Labs     12/18/24 0524 12/19/24 0630 12/20/24  0641   HGB 8.4* 8.0* 7.9*   HCT 25.8* 24.8* 24.1*       Plan  - Monitor serial CBC: Daily  - Transfuse PRBC if patient becomes hemodynamically unstable, symptomatic or H/H drops below 7/21.  - Patient has received 1 units of PRBCs on 11/20, 11/21, 12/15  - Patient's anemia is currently stable  - Hb baseline 7-8. No obvious bleeding  - Eliquis held on admission.  DVT prophylaxis held.    Stage 3a chronic kidney disease  ESRD on HD  Nephrology following  Will try to remove more fluid  Strict intake and output  Renally dose all medications  Avoid nephrotoxic agents  IR placed RIJ TDC 12/16  CM assisting with finding HD chair      JAE (acute kidney injury)  Baseline creatinine is  1.5 . Most recent creatinine and eGFR are listed below.    Recent Labs     12/18/24 0524 12/19/24 0630 12/20/24  0641   CREATININE 3.5* 3.1* 3.5*   EGFRNORACEVR 17.9* 20.7* 17.9*        Plan  - JAE is worsening. Will continue current treatment  - Avoid nephrotoxins and renally dose meds for GFR listed above  - Monitor urine output, serial BMP, and adjust therapy as needed    - Etiology includes volume overload, obstruction,  hypotension, possible HRS  Baseline creatinine is 1.5. May require dialysis long term.   Patient tolerated intermittent HD well (12/12)    IR placed RIJ TDC 12/16. Working to get HD chair.    Atrial fibrillation  Patient has persistent (7 days or more) atrial fibrillation. Patient is currently in atrial fibrillation. [id number] Score: 1. The patients heart rate in the last 24 hours is as follows:  Pulse  Min: 75  Max: 93       Plan  - Patient's afib is currently controlled  Holding home Eliquis in the setting of recent low blood counts, consider continue holding on DC .. Will discuss with patient regarding risks and benefits  Continue metoprolol 12.5 mg BID  - percy ttempt to change to liquid at 6.25 BID if possible and assess HR    Coagulopathy  2/2 cirrhosis. See plan below    Recent Labs   Lab 12/20/24  0641   INR 1.8*     Received 3 doses of IV vitamin K.  End Stage Liver Disease precipitated coagulopathy  Heparin d/c d/t thrombocytopenia          Goals of care, counseling/discussion  Advance Care Planning    Code Status  In light of the patients advanced and life limiting illness,I engaged the the patient and family in a voluntary conversation about the patient's preferences for care  at the very end of life. The patient wishes to have a natural, peaceful death.  Along those lines, the patient wishes to have CPR or other invasive treatments performed when his heart and/or breathing stops. I communicated to the patient and family. Continue FULL CODE.    A total of 15 min was spent on advance care planning, goals of care discussion, emotional support, formulating and communicating prognosis and exploring burden/benefit of various approaches of treatment. This discussion occurred on a fully voluntary basis with the verbal consent of the patient and/or family.           VTE Risk Mitigation (From admission, onward)           Ordered     Place sequential compression device  Until discontinued         11/22/24 0006      IP VTE HIGH RISK PATIENT  Once         11/20/24 1622                    Discharge Planning   AUTUMN: 12/23/2024     Code Status: Full Code   Medical Readiness for Discharge Date:   Discharge Plan A: Skilled Nursing Facility   Discharge Delays: (!) Dialysis Set-up            Please place Justification for DME        Glory Oliva MD  Department of Hospital Medicine   [facility] - Transplant [facility]

## 2024-12-21 NOTE — ASSESSMENT & PLAN NOTE
Baseline creatinine is  1.5 . Most recent creatinine and eGFR are listed below.    Recent Labs     12/19/24  0630 12/20/24  0641 12/21/24  0615   CREATININE 3.1* 3.5* 4.1*   EGFRNORACEVR 20.7* 17.9* 14.8*        Plan  - JAE is worsening. Will continue current treatment  - Avoid nephrotoxins and renally dose meds for GFR listed above  - Monitor urine output, serial BMP, and adjust therapy as needed    - Etiology includes volume overload, obstruction, hypotension, possible HRS  Baseline creatinine is 1.5. May require dialysis long term.   Patient tolerated intermittent HD well (12/12)    IR placed RIJ TDC 12/16. Working to get HD chair.

## 2024-12-21 NOTE — NURSING
-AAOx4, VSS, afebrile, on room air  -Pt worked with PT; ambulated 45ft in room and up to chair  -No complaints of pain or discomfort at this time  -Plan to discharge to SNF on Monday; plan of care discussed with pt and step daughter  -Bed low and locked with call light in reach

## 2024-12-21 NOTE — PLAN OF CARE
On-call SW:    INDY contacted Simon [phone] at [place] in regards to pt HD being set up and complete, but no answered.   Pt has a new HD chair and is a new admit to NH.   Pt will have to discharge on Monday.   Medical team notified.    Weekday CM will follow up.     Nazia Keenan MSW, CSW

## 2024-12-21 NOTE — ASSESSMENT & PLAN NOTE
The likely etiology of thrombocytopenia is liver disease. The patients 3 most recent labs are listed below.  Recent Labs     12/18/24  0524 12/19/24  0630 12/20/24  0641   PLT 60* 61* 78*       Plan  - Will transfuse if platelet count is <50k (if undergoing surgical procedure or have active bleeding).

## 2024-12-21 NOTE — ASSESSMENT & PLAN NOTE
2/2 cirrhosis. See plan below    Recent Labs   Lab 12/21/24  0615   INR 1.5*     Received 3 doses of IV vitamin K.  End Stage Liver Disease precipitated coagulopathy  Heparin d/c d/t thrombocytopenia

## 2024-12-21 NOTE — ASSESSMENT & PLAN NOTE
Etoh Cirrhosis  Hx of HCC s/p Y90    MELD 3.0: 31 at 12/21/2024  6:15 AM  MELD-Na: 31 at 12/21/2024  6:15 AM  Calculated from:  Serum Creatinine: On dialysis. Using the maximum value.  Serum Sodium: 130 mmol/L at 12/21/2024  6:15 AM  Total Bilirubin: 3 mg/dL at 12/21/2024  6:15 AM  Serum Albumin: 2.1 g/dL at 12/21/2024  6:15 AM  INR(ratio): 1.5 at 12/21/2024  6:15 AM  Age at listing (hypothetical): 69 years  Sex: Male at 12/21/2024  6:15 AM    Cont lactulose    H/O HCC: treated with Y90 in June 2023.Last MRI abd w/o evidence of residual or recurrent disease 3/24/24. Due for repeat surveillance MRI as an outpatient.    Appreciate hepatology recommendations.   Current concerns regarding transplant include higher BMI, frailty / debility.       Discharge planning to SNF

## 2024-12-21 NOTE — SUBJECTIVE & OBJECTIVE
Interval History:     NAEON. NO further oozing from lip injury. Forehead hematoma improved.     accepted for nursing facility however awaiting for hemodialysis chair. Given this is a new admission possible anticipated discharge on Monday per SNF and requesting attempts to see if systolics can improve > 110. we will lower his Lopressor if possible. We will hold his Flomax.         Review of Systems   Constitutional:  Negative for chills and fever.   HENT:  Negative for nosebleeds.    Respiratory:  Negative for cough, chest tightness and shortness of breath.    Cardiovascular:  Positive for leg swelling.   Gastrointestinal:  Positive for abdominal distention and diarrhea. Negative for abdominal pain and vomiting.   Genitourinary:  Positive for decreased urine volume, difficulty urinating and scrotal swelling.   Musculoskeletal:  Negative for neck pain.   Skin:  Positive for pallor and wound.   Neurological:  Negative for dizziness, syncope, light-headedness and headaches.   Psychiatric/Behavioral:  Negative for confusion.    All other systems reviewed and are negative.    Objective:     Vital Signs (Most Recent):  Temp: 98.1 °F (36.7 °C) (12/20/24 1556)  Pulse: 89 (12/20/24 1556)  Resp: 20 (12/20/24 1556)  BP: (!) 97/53 (12/20/24 1556)  SpO2: 96 % (12/20/24 1556) Vital Signs (24h Range):  Temp:  [97.7 °F (36.5 °C)-98.1 °F (36.7 °C)] 98.1 °F (36.7 °C)  Pulse:  [75-93] 89  Resp:  [17-20] 20  SpO2:  [96 %-100 %] 96 %  BP: ()/(53-72) 97/53   Weight: (!) 140.6 kg (310 lb)  Body mass index is 40.9 kg/m².      Intake/Output Summary (Last 24 hours) at 12/20/2024 1837  Last data filed at 12/20/2024 0511  Gross per 24 hour   Intake 480 ml   Output --   Net 480 ml          Physical Exam  Vitals and nursing note reviewed.   Constitutional:       General: He is awake. He is not in acute distress.     Appearance: He is obese. He is ill-appearing. He is not toxic-appearing.   HENT:      Head:      Comments: Contusion and small  bump around right glabella.      Mouth/Throat:      Comments: Lower lip with mild injury, mild oozing   Eyes:      Extraocular Movements: Extraocular movements intact.      Conjunctiva/sclera: Conjunctivae normal.   Cardiovascular:      Rate and Rhythm: Normal rate.   Pulmonary:      Effort: Pulmonary effort is normal. No respiratory distress.   Abdominal:      General: There is distension.      Palpations: Abdomen is soft.      Tenderness: There is no abdominal tenderness. There is no guarding or rebound.   Musculoskeletal:         General: No swelling or tenderness.      Right lower leg: Edema present.      Left lower leg: Edema present.      Comments: 2+ pitting edema in bilateral lower extremities   Skin:     General: Skin is warm.      Coloration: Skin is not jaundiced.      Findings: Bruising present.   Neurological:      Mental Status: He is alert and oriented to person, place, and time.      Motor: No weakness.   Psychiatric:         Behavior: Behavior normal.            Significant Labs:    CBC/Anemia Profile:  Recent Labs   Lab 12/19/24  0630 12/20/24  0641   WBC 6.15 6.01   HGB 8.0* 7.9*   HCT 24.8* 24.1*   PLT 61* 78*   MCV 83 83   RDW 27.1* 27.7*        Chemistries:  Recent Labs   Lab 12/19/24  0630 12/20/24  0641   * 130*   K 4.1 3.4*    102   CO2 22* 19*   BUN 17 22   CREATININE 3.1* 3.5*   CALCIUM 8.5* 8.5*   ALBUMIN 2.2* 2.2*   PROT 5.5* 5.6*   BILITOT 3.3* 3.5*   ALKPHOS 91 91   ALT 24 21   AST 41* 39   MG 2.0  --    PHOS 3.4 4.1       All pertinent labs within the past 24 hours have been reviewed.    Significant Imaging:  I have reviewed all pertinent imaging results/findings within the past 24 hours.

## 2024-12-22 LAB
ALBUMIN SERPL BCP-MCNC: 2.2 G/DL (ref 3.5–5.2)
ALP SERPL-CCNC: 106 U/L (ref 40–150)
ALT SERPL W/O P-5'-P-CCNC: 20 U/L (ref 10–44)
ANION GAP SERPL CALC-SCNC: 8 MMOL/L (ref 8–16)
AST SERPL-CCNC: 38 U/L (ref 10–40)
BASOPHILS # BLD AUTO: 0.06 K/UL (ref 0–0.2)
BASOPHILS NFR BLD: 0.8 % (ref 0–1.9)
BILIRUB SERPL-MCNC: 3.1 MG/DL (ref 0.1–1)
BUN SERPL-MCNC: 20 MG/DL (ref 8–23)
CALCIUM SERPL-MCNC: 8.3 MG/DL (ref 8.7–10.5)
CHLORIDE SERPL-SCNC: 102 MMOL/L (ref 95–110)
CO2 SERPL-SCNC: 20 MMOL/L (ref 23–29)
CREAT SERPL-MCNC: 2.6 MG/DL (ref 0.5–1.4)
DIFFERENTIAL METHOD BLD: ABNORMAL
EOSINOPHIL # BLD AUTO: 0.2 K/UL (ref 0–0.5)
EOSINOPHIL NFR BLD: 2.9 % (ref 0–8)
ERYTHROCYTE [DISTWIDTH] IN BLOOD BY AUTOMATED COUNT: 27.2 % (ref 11.5–14.5)
EST. GFR  (NO RACE VARIABLE): 25.6 ML/MIN/1.73 M^2
GLUCOSE SERPL-MCNC: 100 MG/DL (ref 70–110)
HCT VFR BLD AUTO: 24.4 % (ref 40–54)
HGB BLD-MCNC: 8.5 G/DL (ref 14–18)
IMM GRANULOCYTES # BLD AUTO: 0.16 K/UL (ref 0–0.04)
IMM GRANULOCYTES NFR BLD AUTO: 2 % (ref 0–0.5)
INR PPP: 1.6 (ref 0.8–1.2)
LYMPHOCYTES # BLD AUTO: 0.6 K/UL (ref 1–4.8)
LYMPHOCYTES NFR BLD: 7.9 % (ref 18–48)
MCH RBC QN AUTO: 28.8 PG (ref 27–31)
MCHC RBC AUTO-ENTMCNC: 34.8 G/DL (ref 32–36)
MCV RBC AUTO: 83 FL (ref 82–98)
MONOCYTES # BLD AUTO: 1.5 K/UL (ref 0.3–1)
MONOCYTES NFR BLD: 18.7 % (ref 4–15)
NEUTROPHILS # BLD AUTO: 5.4 K/UL (ref 1.8–7.7)
NEUTROPHILS NFR BLD: 67.7 % (ref 38–73)
NRBC BLD-RTO: 0 /100 WBC
PHOSPHATE SERPL-MCNC: 2.3 MG/DL (ref 2.7–4.5)
PLATELET # BLD AUTO: 76 K/UL (ref 150–450)
PMV BLD AUTO: ABNORMAL FL (ref 9.2–12.9)
POTASSIUM SERPL-SCNC: 3.7 MMOL/L (ref 3.5–5.1)
PROT SERPL-MCNC: 5.6 G/DL (ref 6–8.4)
PROTHROMBIN TIME: 17.2 SEC (ref 9–12.5)
RBC # BLD AUTO: 2.95 M/UL (ref 4.6–6.2)
SODIUM SERPL-SCNC: 130 MMOL/L (ref 136–145)
WBC # BLD AUTO: 7.93 K/UL (ref 3.9–12.7)

## 2024-12-22 PROCEDURE — 97535 SELF CARE MNGMENT TRAINING: CPT

## 2024-12-22 PROCEDURE — 80100014 HC HEMODIALYSIS 1:1

## 2024-12-22 PROCEDURE — 99232 SBSQ HOSP IP/OBS MODERATE 35: CPT | Mod: ,,, | Performed by: INTERNAL MEDICINE

## 2024-12-22 PROCEDURE — 25000003 PHARM REV CODE 250

## 2024-12-22 PROCEDURE — 80053 COMPREHEN METABOLIC PANEL: CPT

## 2024-12-22 PROCEDURE — 25000003 PHARM REV CODE 250: Performed by: INTERNAL MEDICINE

## 2024-12-22 PROCEDURE — 20600001 HC STEP DOWN PRIVATE ROOM

## 2024-12-22 PROCEDURE — 97530 THERAPEUTIC ACTIVITIES: CPT

## 2024-12-22 PROCEDURE — 85610 PROTHROMBIN TIME: CPT | Performed by: INTERNAL MEDICINE

## 2024-12-22 PROCEDURE — 25000003 PHARM REV CODE 250: Performed by: STUDENT IN AN ORGANIZED HEALTH CARE EDUCATION/TRAINING PROGRAM

## 2024-12-22 PROCEDURE — 85025 COMPLETE CBC W/AUTO DIFF WBC: CPT

## 2024-12-22 PROCEDURE — 84100 ASSAY OF PHOSPHORUS: CPT

## 2024-12-22 RX ADMIN — LACTULOSE 30 G: 20 SOLUTION ORAL at 04:12

## 2024-12-22 RX ADMIN — MIDODRINE HYDROCHLORIDE 20 MG: 5 TABLET ORAL at 06:12

## 2024-12-22 RX ADMIN — MIDODRINE HYDROCHLORIDE 20 MG: 5 TABLET ORAL at 02:12

## 2024-12-22 RX ADMIN — METOPROLOL TARTRATE 6.25 MG: 25 TABLET, FILM COATED ORAL at 08:12

## 2024-12-22 RX ADMIN — MICONAZOLE NITRATE: 20 OINTMENT TOPICAL at 08:12

## 2024-12-22 RX ADMIN — FAMOTIDINE 20 MG: 20 TABLET ORAL at 08:12

## 2024-12-22 RX ADMIN — METOPROLOL TARTRATE 6.25 MG: 25 TABLET, FILM COATED ORAL at 09:12

## 2024-12-22 RX ADMIN — HYDROCORTISONE 10 MG: 5 TABLET ORAL at 06:12

## 2024-12-22 RX ADMIN — MICONAZOLE NITRATE: 20 OINTMENT TOPICAL at 09:12

## 2024-12-22 RX ADMIN — LACTULOSE 30 G: 20 SOLUTION ORAL at 09:12

## 2024-12-22 RX ADMIN — MIDODRINE HYDROCHLORIDE 20 MG: 5 TABLET ORAL at 04:12

## 2024-12-22 RX ADMIN — MIDODRINE HYDROCHLORIDE 20 MG: 5 TABLET ORAL at 09:12

## 2024-12-22 RX ADMIN — LACTULOSE 30 G: 20 SOLUTION ORAL at 08:12

## 2024-12-22 NOTE — ASSESSMENT & PLAN NOTE
JAE is likely due to pre-renal azotemia due to intravascular volume depletion secondary to decompensated hepatic cirrhosis with volume overload and acute tubular necrosis caused by hemodynamic instability, renal hypoperfusion, severe sepsis with GNR bacteremia. Initial presentation concerning for hepatorenal syndrome, transferred to MICU for vasopressors without success in reaching MAP goal 85-90 for treatment of HRS. Currently, patient with oligouric JAE more contributed by ATN as above.     Recommendations:      - HD session last night,  3 hours duration, tolerated the procedure well, 1.5 L removed  -  we will continue to monitor on daily basis, lytes, fluid management  ( blood pressure still on the lower side (91/55)  - Avoid hypotensive episodes  - Strict I/Os  -  we will continue to monitor for signs of renal recovery

## 2024-12-22 NOTE — ASSESSMENT & PLAN NOTE
The likely etiology of thrombocytopenia is liver disease. The patients 3 most recent labs are listed below.  Recent Labs     12/20/24  0641 12/21/24  0615 12/22/24  0625   PLT 78* 89* 76*       Plan  - Will transfuse if platelet count is <50k (if undergoing surgical procedure or have active bleeding).

## 2024-12-22 NOTE — ASSESSMENT & PLAN NOTE
Anemia is likely due to chronic disease due to Chronic liver disease. Most recent hemoglobin and hematocrit are listed below.  Recent Labs     12/20/24  0641 12/21/24  0615 12/22/24  0625   HGB 7.9* 7.8* 8.5*   HCT 24.1* 23.8* 24.4*       Plan  - Monitor serial CBC: Daily  - Transfuse PRBC if patient becomes hemodynamically unstable, symptomatic or H/H drops below 7/21.  - Patient has received 1 units of PRBCs on 11/20, 11/21, 12/15  - Patient's anemia is currently stable  - Hb baseline 7-8. No obvious bleeding  - Eliquis held on admission.  DVT prophylaxis held.

## 2024-12-22 NOTE — PROGRESS NOTES
[facility] - Transplant Stepdown  Nephrology  Progress Note    Patient Name: Juan Carlos Yoo Sr.  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 Recent admission 10/4 at [facility] for decompensated hepatic cirrhosis where he was discharged with oral diuretic regimen including furosemide 60 mg BID and metolazone 2.5 mg daily, low salt diet with fluid restriction. It is unclear if patient is adherent to these medications or lifestyle modifications. W/u notable for anemia with hb 6.7 s/p 1 unit pRBC 11/20 and JAE on CKD w elevated BUN 49/creatinine 5.9, hyperkalemia (K 6.1>5.1 after shifting), bicarb 18, elevated lactate up to 3.5. Nephrology consulted for JAE with c/o HRS    Interval History:       Status post hemodialysis last night, tolerated the procedure well, 3 hours duration, 1.5 L removed, blood pressure still on the low side  91/55    Review of patient's allergies indicates:  No Known Allergies  Current Facility-Administered Medications   Medication Frequency    albuterol-ipratropium 2.5 mg-0.5 mg/3 mL nebulizer solution 3 mL Q6H PRN    aluminum-magnesium hydroxide-simethicone 200-200-20 mg/5 mL suspension 30 mL QID PRN    dextrose 10% bolus 125 mL 125 mL PRN    dextrose 10% bolus 250 mL 250 mL PRN    famotidine tablet 20 mg Every other day    glucagon (human recombinant) injection 1 mg PRN    glucose chewable tablet 16 g PRN    glucose chewable tablet 24 g PRN    heparin (porcine) injection 1,000 Units Once     hydrocortisone tablet 10 mg Q24H    Followed by    [START ON 12/24/2024] hydrocortisone tablet 5 mg Q24H    lactulose 20 gram/30 mL solution Soln 30 g TID    melatonin tablet 6 mg Nightly PRN    metoprolol 12.5mg/1.25mL oral solution 6.25 mg BID    miconazole nitrate 2% ointment BID    midodrine tablet 15 mg PRN    midodrine tablet 20 mg Q8H    midodrine tablet 20 mg Daily PRN    naloxone 0.4 mg/mL injection 0.02 mg PRN    ondansetron injection 4 mg Q8H PRN    polyethylene glycol packet 17 g BID PRN    simethicone chewable tablet 80 mg QID PRN    sodium chloride 0.9% flush 10 mL PRN       Objective:     Vital Signs (Most Recent):  Temp: 98.4 °F (36.9 °C) (12/22/24 1140)  Pulse: 81 (12/22/24 1140)  Resp: 18 (12/22/24 1140)  BP: (!) 97/55 (12/22/24 1140)  SpO2: 98 % (12/22/24 1140) Vital Signs (24h Range):  Temp:  [97.5 °F (36.4 °C)-98.4 °F (36.9 °C)] 98.4 °F (36.9 °C)  Pulse:  [76-95] 81  Resp:  [18-20] 18  SpO2:  [95 %-100 %] 98 %  BP: ()/(48-60) 97/55     Weight: (!) 142.9 kg (315 lb) (12/21/24 0600)  Body mass index is 41.56 kg/m².  Body surface area is 2.71 meters squared.    I/O last 3 completed shifts:  In: 1020 [P.O.:520; Other:500]  Out: 2300 [Urine:300; Other:2000]     Physical Exam  Constitutional:       General: He is not in acute distress.     Appearance: He is ill-appearing. He is not toxic-appearing.   HENT:      Head: Normocephalic and atraumatic.   Eyes:      Extraocular Movements: Extraocular movements intact.      Pupils: Pupils are equal, round, and reactive to light.   Cardiovascular:      Rate and Rhythm: Normal rate.   Pulmonary:      Effort: No respiratory distress.      Breath sounds: No wheezing or rales.   Abdominal:      General: There is distension.      Tenderness: There is no abdominal tenderness. There is no guarding.   Musculoskeletal:      Right lower leg: Edema present.      Left lower leg: Edema present.   Skin:     Coloration: Skin is pale. Skin is not jaundiced.  "  Neurological:      Mental Status: He is oriented to person, place, and time.   Psychiatric:         Behavior: Behavior normal.          Significant Labs:  CBC:   Recent Labs   Lab 12/22/24  0625   WBC 7.93   RBC 2.95*   HGB 8.5*   HCT 24.4*   PLT 76*   MCV 83   MCH 28.8   MCHC 34.8     CMP:   Recent Labs   Lab 12/22/24  0625      CALCIUM 8.3*   ALBUMIN 2.2*   PROT 5.6*   *   K 3.7   CO2 20*      BUN 20   CREATININE 2.6*   ALKPHOS 106   ALT 20   AST 38   BILITOT 3.1*     LFTs:   Recent Labs   Lab 12/22/24  0625   ALT 20   AST 38   ALKPHOS 106   BILITOT 3.1*   PROT 5.6*   ALBUMIN 2.2*     PTH: No results for input(s): "PTH" in the last 168 hours.  TSH: No results for input(s): "TSH" in the last 168 hours.  Recent Labs   Lab 12/19/24  1712   COLORU Yellow   SPECGRAV 1.015   PHUR 6.0   PROTEINUA 1+*   BACTERIA Occasional   NITRITE Negative   LEUKOCYTESUR 3+*   HYALINECASTS 0       Assessment/Plan:     Renal/  JAE (acute kidney injury)  JAE is likely due to pre-renal azotemia due to intravascular volume depletion secondary to decompensated hepatic cirrhosis with volume overload and acute tubular necrosis caused by hemodynamic instability, renal hypoperfusion, severe sepsis with GNR bacteremia. Initial presentation concerning for hepatorenal syndrome, transferred to MICU for vasopressors without success in reaching MAP goal 85-90 for treatment of HRS. Currently, patient with oligouric JAE more contributed by ATN as above.     Recommendations:      - HD session last night,  3 hours duration, tolerated the procedure well, 1.5 L removed  -  we will continue to monitor on daily basis, lytes, fluid management  ( blood pressure still on the lower side (91/55)  - Avoid hypotensive episodes  - Strict I/Os  -  we will continue to monitor for signs of renal recovery            Thank you for your consult. I will follow-up with patient. Please contact us if you have any additional questions.    Irma Hernandez, " MD  Nephrology  [facility] - Transplant Stepdown

## 2024-12-22 NOTE — NURSING
-AAOx4, VSS, afebrile, on room air  -Pt ambulated with PT in room and got up to chair for 5 hours  -Arm wounds cleansed with NS and Cavilon spray applied; New dressings applied and dated  -No complaints of pain or discomfort at this time  -Plan to discharge to SNF on Monday  -Bed low and locked with call light in reach

## 2024-12-22 NOTE — ASSESSMENT & PLAN NOTE
2/2 cirrhosis. See plan below    Recent Labs   Lab 12/22/24  0625   INR 1.6*     Received 3 doses of IV vitamin K.  End Stage Liver Disease precipitated coagulopathy  Heparin d/c d/t thrombocytopenia

## 2024-12-22 NOTE — ASSESSMENT & PLAN NOTE
Hyponatremia is likely due to Cirrhosis. The patient's most recent sodium results are listed below.  Recent Labs     12/20/24  0641 12/21/24  0615 12/22/24  0625   * 130* 130*       Plan  - Correct the sodium by 4-6mEq in 24 hours.   - Appreciate nephrology recommendations  - Monitor sodium daily  - Patient hyponatremia is stable  - Mgmt with HD

## 2024-12-22 NOTE — NURSING
RN spoke w/ Dialysis RN about pt HD tonight. Dialysis RN informed this RN of high volume emergent cases in need of HD, will most likely push this pt HD til AM. Pt made aware.

## 2024-12-22 NOTE — SUBJECTIVE & OBJECTIVE
Interval History:     NAEON. Forehead hematoma improved.     accepted for nursing facility however awaiting for hemodialysis chair. Given this is a new admission possible anticipated discharge on Monday per [facility]     Review of Systems   Constitutional:  Negative for chills and fever.   HENT:  Negative for nosebleeds.    Respiratory:  Negative for cough.    Cardiovascular:  Positive for leg swelling.   Gastrointestinal:  Positive for abdominal distention. Negative for abdominal pain and diarrhea.   Genitourinary:  Positive for scrotal swelling.   Skin:  Positive for pallor and wound.   Neurological:  Negative for dizziness and light-headedness.   Psychiatric/Behavioral:  Negative for confusion.    All other systems reviewed and are negative.    Objective:     Vital Signs (Most Recent):  Temp: 98.3 °F (36.8 °C) (12/22/24 1547)  Pulse: 85 (12/22/24 1547)  Resp: 18 (12/22/24 1547)  BP: (!) 101/49 (12/22/24 1547)  SpO2: 99 % (12/22/24 1547) Vital Signs (24h Range):  Temp:  [97.5 °F (36.4 °C)-98.4 °F (36.9 °C)] 98.3 °F (36.8 °C)  Pulse:  [76-95] 85  Resp:  [18-20] 18  SpO2:  [95 %-100 %] 99 %  BP: ()/(48-60) 101/49   Weight: (!) 142.9 kg (315 lb)  Body mass index is 41.56 kg/m².      Intake/Output Summary (Last 24 hours) at 12/22/2024 1642  Last data filed at 12/22/2024 0610  Gross per 24 hour   Intake 620 ml   Output 2000 ml   Net -1380 ml          Physical Exam  Vitals and nursing note reviewed.   Constitutional:       General: He is awake. He is not in acute distress.     Appearance: He is obese. He is ill-appearing.   HENT:      Head:      Comments: Contusion and small bump around right glabella.      Mouth/Throat:      Comments: Lower lip with mild injury, mild oozing   Eyes:      Extraocular Movements: Extraocular movements intact.      Conjunctiva/sclera: Conjunctivae normal.   Cardiovascular:      Rate and Rhythm: Normal rate.   Pulmonary:      Effort: Pulmonary effort is normal. No respiratory distress.    Abdominal:      General: There is distension.      Palpations: Abdomen is soft.      Tenderness: There is no abdominal tenderness.   Musculoskeletal:         General: No swelling or tenderness.      Right lower leg: Edema present.      Left lower leg: Edema present.      Comments: 2+ pitting edema in bilateral lower extremities   Skin:     General: Skin is warm.      Coloration: Skin is not jaundiced.      Findings: Bruising present.   Neurological:      Mental Status: He is alert and oriented to person, place, and time.      Motor: No weakness.   Psychiatric:         Behavior: Behavior normal.            Significant Labs:    CBC/Anemia Profile:  Recent Labs   Lab 12/21/24  0615 12/22/24  0625   WBC 6.11 7.93   HGB 7.8* 8.5*   HCT 23.8* 24.4*   PLT 89* 76*   MCV 84 83   RDW 27.6* 27.2*        Chemistries:  Recent Labs   Lab 12/21/24  0615 12/22/24  0625   * 130*   K 3.3* 3.7    102   CO2 21* 20*   BUN 28* 20   CREATININE 4.1* 2.6*   CALCIUM 8.5* 8.3*   ALBUMIN 2.1* 2.2*   PROT 5.5* 5.6*   BILITOT 3.0* 3.1*   ALKPHOS 102 106   ALT 21 20   AST 37 38   PHOS 3.7 2.3*       All pertinent labs within the past 24 hours have been reviewed.    Significant Imaging:  I have reviewed all pertinent imaging results/findings within the past 24 hours.

## 2024-12-22 NOTE — PROGRESS NOTES
Hemodialysis treatment completed. Blood returned. Dialyzed patient for 3 hours with net fluid removal of 1.5 L d/t soft pressures.       R TDC flushed with saline and locked with heparin. Lumens capped and wrapped in sterile gauze.     Report given to primary nurse.

## 2024-12-22 NOTE — ASSESSMENT & PLAN NOTE
Baseline creatinine is  1.5 . Most recent creatinine and eGFR are listed below.    Recent Labs     12/20/24  0641 12/21/24  0615 12/22/24  0625   CREATININE 3.5* 4.1* 2.6*   EGFRNORACEVR 17.9* 14.8* 25.6*        Plan  - JAE is worsening. Will continue current treatment  - Avoid nephrotoxins and renally dose meds for GFR listed above  - Monitor urine output, serial BMP, and adjust therapy as needed    - Etiology includes volume overload, obstruction, hypotension, possible HRS  Baseline creatinine is 1.5. May require dialysis long term.   Patient tolerated intermittent HD well (12/12)    IR placed RIJ TDC 12/16. Working to get HD chair.

## 2024-12-22 NOTE — PROGRESS NOTES
[facility] - Transplant [facility] Medicine  Progress Note    Patient Name: Juan Carlos Yoo Sr.  MRN: [id number]  Patient Class: IP- Inpatient   Admission Date: 11/20/2024  Length of Stay: 32 days  Attending Physician: Glory Oliva MD  Primary Care Provider: Nyla Rios FNP        Subjective     Principal Problem:Decompensated cirrhosis        HPI:  69M with PMH of alcoholic cirrhosis, esophageal varices, Afib on eliquis, and HTN who presents for c/o worsening diffuse swelling as well as R arm pain/erythema. He was recently hospitalized 1 month ago at [facility] for volume overload in the setting of decompensated cirrhosis. He was treated with IV diuresis and discharged with adjustment to his diuretics, currently on lasix 60mg BID and metolazone 2.5mg every other week as per family. Patient reports diffuse swelling of his upper and lower extremities in addition to distended abdomen and worsening dyspnea, which he believes is due to recent medication adjustment. Family states he is non-compliant with low sodium diet and fluid restriction. Family states he has been compliant with diuretics, but rom't taken eliquis for 3 days due to issue getting refill. He also reports scratching right arm on door frame 3-4 days ago which has become red and painful after wrapping in in bandage. He claims to be compliant with medications, but is a poor historian. He follows with hepatology, not currently active on transplant list. He states he hasn't consumed alcohol for at least 9 months. Has c/o chills, but denies fever, cough, nausea, vomiting, chest pain, dysuria, hematuria, blood in stool. Workup in ED remarkable for VS: T 97.6 HR 94 RR 22 BP 95/56 O2 sat 97% on RA. Hb 6.7, MCV 75, Plt 81, PT/INR 22.6/2.2, Na 128, K 6.1, BUN/Cr 49/5.7, Alb 2.8, AST/ALT 35/9, Ammonia 104, , Trop neg, LA 2.9, CXR: Cardiac size is enlarged similar to prior.  No large volume of pleural fluid noted although there is mild blunting of the  right costophrenic angle which may relate to a small amount of pleural fluid.  Suspected right basilar opacity, to be correlated clinically for infection. RUE venous doppler: No thrombus in central veins of the right upper extremity. Patient received vanc/zosyn and lasix 80mg IVP in ED and will be admitted to hospital medicine service for further management.    Overview/Hospital Course:  69 y.o. male with history of EtOH use disorder, EtOH cirrhosis, HCC s/p y90, morbid obesity BMI 44, HTN, and Afib who presents with severe anasarca and JAE.      Appreciate hepatology and nephrology recommendations.  Diuretics were held because of concern for HRS.  Patient was started on albumin and midodrine per Nephrology recommendations for HRS.  Renal function continued to worsen and recommendation per Nephrology to transfer to ICU for maintaining goal map over 85 on Levophed.  ICU was notified and patient to be assessed to be transferred to ICU.     Patient also has Gram-positive cocci and Gram-negative rods in his blood now.  Possible sources could be got translocation and barrier related infection through skin as he has been significantly edematous and has been oozing.  Has been on vancomycin and Rocephin.  Id was consulted this morning.  Repeat blood cultures were obtained.     Continues to have anemia.  Hemoglobin dropped on 11/20 and was given 1 unit of packed red blood cells.  Did not respond appropriately.  Hemoglobin dropped again to 6.8 on 11/21 and was given 1 unit of packed red blood cells.  Hemoglobin again on 11/22 is 7.2.  No reported melena or hematochezia.  Patient was on Eliquis for atrial fibrillation prior to admission which was held.  Given history of esophageal varices and portal hypertensive gastropathy whereas also could be component of slow bleeding, under production due to CKD and hemolysis while also with decompensated cirrhosis.  Started on Protonix IV b.i.d. and octreotide.     Also clarified with  hepatology.  Given patient's current infection we will await ID recommendations however will be challenging to consider transplant evaluation at this time.  Current concerns regarding transplant include higher BMI, frailty / debility.      Goal clarification with the patient and family.  Patient at this time wants to be full code and continue with Levophed in ICU.  They would consider discussion with palliative care in a day or so if things do not get better.      In MICU patient started on Levophed, however titration remained difficult given tachycardic response from the patient.      11/24 Trialysis and arterial lines placed overnight and currently on levo and norepi. SLED today.     11/25 Boo dc. Increased midodrine. Continue steroids until d/c pressors. Discussed with Nephrology about our concern for sustained use of pressors to achieve their MAP goals of 85. Will follow up tomorrow.      11/26 Platelets 48. Repeat 38 confirming thrombocytopenia. Concern for HIT. D/c heparin. Increased lactulose dosing. Bladder scan revealed urine in bladder. Boo placed. Off vaso. Continuing levo. Maintain MAP goals 80. PT/OT consulted.      11/27 MAP goal 75-80. Heparin antibody result pending.      11/28 Pt with abdominal pain, decreased bowel movements, emesis. Lactic acid 2.9 and repeat 2.7. Less concerned for mesenteric ischemia and more of a concern was for SBO. NG tube placed with subsequent suction of 500mL fluid. Abdomen less distended after placement and pt subsequently had bowel movement. MAP Goal of 60 with plan to come off pressors entirely and switch to dialysis after permacath, as he is not  liver transplant eligible at this time.      11/29 Pt with ileus. Clear liquids for now. Platelets 24. HIT versus zosyn induced? Peripheral smear sent. Zosyn changed to kaylah. Consent and transfuse 1U. GOC conversation today. Pt opting for long term dialysis.      11/30 naeon. Started back on heparin. One time dose of 120mg  lasix today. Hold off SLED/SCUF today and give IV lasix 120 mg once; plan for SLED/SCUF tomorrow      12/1 Patient is becoming more dependent on dialysis. Not a current liver transplant candidate. Still complaining of abdominal pain after discontinuing the NGT. AXR with evidence of dilated bowel loops. Brown bomb administered. The days following administration of the brown bomb, patient had more frequent bowel movements and was able to pass flatulence. Constipation eventually resolved.   Given the need for pressor support, he was worked up for adrenal insufficiency which was positive. Consequently, he was started on steroids for adrenal insufficiency. His blood pressures improved, however, he still required pressor support, particularly during dialysis. Pressor support was eventually weaned off. Patient was started on midodrine to help with BP. Nephrology trialed intermittent HD and patient seemed to tolerate it well. Nephrology recommended placement of tunneled catheter for HD. Patient was medically stable for step down to the hospital medicine floors.     Patient underwent testing with cosyntropin stimulation test, results are not compatible with adrenal insufficiency, cont hydrocortisone taper. Patient tolerating HD. IR placed RIJ TDC 12/16. Discharge planning to SNF.    12/19- patient was walking to the bathroom and had a mechanical fall with a his foot stumbling and hit his face of the ledge in the bathroom.  Although he walked with a walker and had nursing staff to help however could not be controlled.  Small contusion of the right glabella and the nose bridge.  CT head without any acute fractures.  Also noting abdominal distention.  We will request a paracentesis.  CT abdomen without any concern for hematoma.    12/20- accepted for nursing facility however awaiting for hemodialysis chair.  Given this is a new  admission possible anticipated discharge on Monday per SNF  and requesting attempts to see if  systolics can improve > 110.  we will lower his Lopressor if possible.  We will hold his Flomax.     12/21- SNF admission on Monday anticipated.     Interval History:     NAEON. Forehead hematoma improved.     accepted for nursing facility however awaiting for hemodialysis chair. Given this is a new admission possible anticipated discharge on Monday per [facility]     Review of Systems   Constitutional:  Negative for chills and fever.   HENT:  Negative for nosebleeds.    Respiratory:  Negative for cough.    Cardiovascular:  Positive for leg swelling.   Gastrointestinal:  Positive for abdominal distention. Negative for abdominal pain and diarrhea.   Genitourinary:  Positive for scrotal swelling.   Skin:  Positive for pallor and wound.   Neurological:  Negative for dizziness and light-headedness.   Psychiatric/Behavioral:  Negative for confusion.    All other systems reviewed and are negative.    Objective:     Vital Signs (Most Recent):  Temp: 98.3 °F (36.8 °C) (12/22/24 1547)  Pulse: 85 (12/22/24 1547)  Resp: 18 (12/22/24 1547)  BP: (!) 101/49 (12/22/24 1547)  SpO2: 99 % (12/22/24 1547) Vital Signs (24h Range):  Temp:  [97.5 °F (36.4 °C)-98.4 °F (36.9 °C)] 98.3 °F (36.8 °C)  Pulse:  [76-95] 85  Resp:  [18-20] 18  SpO2:  [95 %-100 %] 99 %  BP: ()/(48-60) 101/49   Weight: (!) 142.9 kg (315 lb)  Body mass index is 41.56 kg/m².      Intake/Output Summary (Last 24 hours) at 12/22/2024 1642  Last data filed at 12/22/2024 0610  Gross per 24 hour   Intake 620 ml   Output 2000 ml   Net -1380 ml          Physical Exam  Vitals and nursing note reviewed.   Constitutional:       General: He is awake. He is not in acute distress.     Appearance: He is obese. He is ill-appearing.   HENT:      Head:      Comments: Contusion and small bump around right glabella.      Mouth/Throat:      Comments: Lower lip with mild injury, mild oozing   Eyes:      Extraocular Movements: Extraocular movements intact.      Conjunctiva/sclera:  Conjunctivae normal.   Cardiovascular:      Rate and Rhythm: Normal rate.   Pulmonary:      Effort: Pulmonary effort is normal. No respiratory distress.   Abdominal:      General: There is distension.      Palpations: Abdomen is soft.      Tenderness: There is no abdominal tenderness.   Musculoskeletal:         General: No swelling or tenderness.      Right lower leg: Edema present.      Left lower leg: Edema present.      Comments: 2+ pitting edema in bilateral lower extremities   Skin:     General: Skin is warm.      Coloration: Skin is not jaundiced.      Findings: Bruising present.   Neurological:      Mental Status: He is alert and oriented to person, place, and time.      Motor: No weakness.   Psychiatric:         Behavior: Behavior normal.            Significant Labs:    CBC/Anemia Profile:  Recent Labs   Lab 12/21/24  0615 12/22/24  0625   WBC 6.11 7.93   HGB 7.8* 8.5*   HCT 23.8* 24.4*   PLT 89* 76*   MCV 84 83   RDW 27.6* 27.2*        Chemistries:  Recent Labs   Lab 12/21/24  0615 12/22/24  0625   * 130*   K 3.3* 3.7    102   CO2 21* 20*   BUN 28* 20   CREATININE 4.1* 2.6*   CALCIUM 8.5* 8.3*   ALBUMIN 2.1* 2.2*   PROT 5.5* 5.6*   BILITOT 3.0* 3.1*   ALKPHOS 102 106   ALT 21 20   AST 37 38   PHOS 3.7 2.3*       All pertinent labs within the past 24 hours have been reviewed.    Significant Imaging:  I have reviewed all pertinent imaging results/findings within the past 24 hours.    Assessment and Plan     * Decompensated cirrhosis  Etoh Cirrhosis  Hx of HCC s/p Y90    MELD 3.0: 31 at 12/22/2024  6:25 AM  MELD-Na: 32 at 12/22/2024  6:25 AM  Calculated from:  Serum Creatinine: On dialysis. Using the maximum value.  Serum Sodium: 130 mmol/L at 12/22/2024  6:25 AM  Total Bilirubin: 3.1 mg/dL at 12/22/2024  6:25 AM  Serum Albumin: 2.2 g/dL at 12/22/2024  6:25 AM  INR(ratio): 1.6 at 12/22/2024  6:25 AM  Age at listing (hypothetical): 69 years  Sex: Male at 12/22/2024  6:25 AM    Cont lactulose    H/O  HCC: treated with Y90 in June 2023.Last MRI abd w/o evidence of residual or recurrent disease 3/24/24. Due for repeat surveillance MRI as an outpatient.    Appreciate hepatology recommendations.   Current concerns regarding transplant include higher BMI, frailty / debility.       Discharge planning to SNF       Morbid obesity  Body mass index is 40.08 kg/m². Morbid obesity complicates all aspects of disease management from diagnostic modalities to treatment. Weight loss encouraged and health benefits explained to patient.         Adrenal insufficiency  On going problem since admission, most likely multifactorial - component of liver dysfunction, HRS, sepsis on admission.  S/p pressors in ICU     Random cortisol was 6   Persistent hypotension thought to be adrenal insufficiency. Patient underwent testing with cosyntropin stimulation test, results are not compatible with adrenal insufficiency. appreciate endocrinology recommendations   Cont hydrocortisone taper      Moderate protein-calorie malnutrition  Nutrition consulted. Most recent weight and BMI monitored-     Measurements:  Wt Readings from Last 1 Encounters:   12/13/24 (!) 137.8 kg (303 lb 12.7 oz)   Body mass index is 40.08 kg/m².    Patient has been screened and assessed by RD.    Malnutrition Type:  Context: acute illness or injury  Level: moderate    Malnutrition Characteristic Summary:  Energy Intake (Malnutrition): less than 75% for greater than 7 days  Subcutaneous Fat (Malnutrition): mild depletion  Muscle Mass (Malnutrition): mild depletion  Fluid Accumulation (Malnutrition): moderate to severe    Interventions/Recommendations (treatment strategy):  1.)      Hypotension        Edema  Volume mgmt with RRT      Debility  Patient with Acute on chronic debility due to chronic unspecified fatigue, age-related physical debility, and other reduced mobility. The patient's latest AMPAC (Activity Measure for Post Acute Care) Score is listed below.    AM-PAC  Score - How much help does the patient need for each activity listed  Basic Mobility Total Score: 17  Turning over in bed (including adjusting bedclothes, sheets and blankets)?: A little  Sitting down on and standing up from a chair with arms (e.g., wheelchair, bedside commode, etc.): A little  Moving from lying on back to sitting on the side of the bed?: A little  Moving to and from a bed to a chair (including a wheelchair)?: A little  Need to walk in hospital room?: A little  Climbing 3-5 steps with a railing?: A lot    Plan  - Progressive mobility protocol initated  - PT/OT consulted  - Fall precautions in place  - PT/OT recommending LI therapy. Planning for SNF      Advanced care planning/counseling discussion        Palliative care encounter  Goals of care, counseling/discussion  Advance Care Planning  Patient requests FULL CODE status and would like to continue current treatment      Abdominal pain        Gram-negative bacteremia  Blood cultures from admission with B. Diminuta, micrococcus luteus, lysinobacillus species. Seen by ID previously who recommended stopping antibiotics due to concern these were contaminants. Repeat blood cultures have been no growth. Has since been transferred to ICU with increased pressor requirement. Blood cultures repeated on 11/24 are no growth x 24 hours.   11/29 Switched from zosyn to meropenem due to concern of thrombocytopenia.   Finished meropenem course 12/8 @ 6 PM    Encephalopathy, metabolic  2/2 decompensated cirrhosis and JAE  Resolved    Hyponatremia  Hyponatremia is likely due to Cirrhosis. The patient's most recent sodium results are listed below.  Recent Labs     12/20/24  0641 12/21/24  0615 12/22/24  0625   * 130* 130*       Plan  - Correct the sodium by 4-6mEq in 24 hours.   - Appreciate nephrology recommendations  - Monitor sodium daily  - Patient hyponatremia is stable  - Mgmt with HD    Thrombocytopenia  The likely etiology of thrombocytopenia is liver  disease. The patients 3 most recent labs are listed below.  Recent Labs     12/20/24  0641 12/21/24  0615 12/22/24  0625   PLT 78* 89* 76*       Plan  - Will transfuse if platelet count is <50k (if undergoing surgical procedure or have active bleeding).      Microcytic anemia  Anemia is likely due to chronic disease due to Chronic liver disease. Most recent hemoglobin and hematocrit are listed below.  Recent Labs     12/20/24  0641 12/21/24  0615 12/22/24  0625   HGB 7.9* 7.8* 8.5*   HCT 24.1* 23.8* 24.4*       Plan  - Monitor serial CBC: Daily  - Transfuse PRBC if patient becomes hemodynamically unstable, symptomatic or H/H drops below 7/21.  - Patient has received 1 units of PRBCs on 11/20, 11/21, 12/15  - Patient's anemia is currently stable  - Hb baseline 7-8. No obvious bleeding  - Eliquis held on admission.  DVT prophylaxis held.    Stage 3a chronic kidney disease  ESRD on HD  Nephrology following  Will try to remove more fluid  Strict intake and output  Renally dose all medications  Avoid nephrotoxic agents  IR placed RIJ TDC 12/16  CM assisting with finding HD chair      JAE (acute kidney injury)  Baseline creatinine is  1.5 . Most recent creatinine and eGFR are listed below.    Recent Labs     12/20/24  0641 12/21/24  0615 12/22/24  0625   CREATININE 3.5* 4.1* 2.6*   EGFRNORACEVR 17.9* 14.8* 25.6*        Plan  - JAE is worsening. Will continue current treatment  - Avoid nephrotoxins and renally dose meds for GFR listed above  - Monitor urine output, serial BMP, and adjust therapy as needed    - Etiology includes volume overload, obstruction, hypotension, possible HRS  Baseline creatinine is 1.5. May require dialysis long term.   Patient tolerated intermittent HD well (12/12)    IR placed RIJ TDC 12/16. Working to get HD chair.    Atrial fibrillation  Patient has persistent (7 days or more) atrial fibrillation. Patient is currently in atrial fibrillation. [id number] Score: 1. The patients heart rate in the  last 24 hours is as follows:  Pulse  Min: 75  Max: 93       Plan  - Patient's afib is currently controlled  Holding home Eliquis in the setting of recent low blood counts, consider continue holding on DC .. Will discuss with patient regarding risks and benefits  Continue metoprolol 12.5 mg BID  - percy ttempt to change to liquid at 6.25 BID if possible and assess HR    Coagulopathy  2/2 cirrhosis. See plan below    Recent Labs   Lab 12/22/24  0625   INR 1.6*     Received 3 doses of IV vitamin K.  End Stage Liver Disease precipitated coagulopathy  Heparin d/c d/t thrombocytopenia          Goals of care, counseling/discussion  Advance Care Planning    Code Status  In light of the patients advanced and life limiting illness,I engaged the the patient and family in a voluntary conversation about the patient's preferences for care  at the very end of life. The patient wishes to have a natural, peaceful death.  Along those lines, the patient wishes to have CPR or other invasive treatments performed when his heart and/or breathing stops. I communicated to the patient and family. Continue FULL CODE.    A total of 15 min was spent on advance care planning, goals of care discussion, emotional support, formulating and communicating prognosis and exploring burden/benefit of various approaches of treatment. This discussion occurred on a fully voluntary basis with the verbal consent of the patient and/or family.           VTE Risk Mitigation (From admission, onward)           Ordered     heparin (porcine) injection 1,000 Units  Once         12/21/24 1812     Place sequential compression device  Until discontinued         11/22/24 1329     IP VTE HIGH RISK PATIENT  Once         11/20/24 1622                    Discharge Planning   AUTUMN: 12/23/2024     Code Status: Full Code   Medical Readiness for Discharge Date:   Discharge Plan A: Skilled Nursing Facility   Discharge Delays: (!) Dialysis Set-up            Please place  Justification for DME        Glory Oliva MD  Department of Hospital Medicine   [facility] - Transplant Stepdown

## 2024-12-22 NOTE — PROGRESS NOTES
Bedside hemodialysis treatment 1:1 started per MD orders via R TDC accessed using aseptic technique. BP soft with SBP in the low 100s but otherwise stable to start tx. PRN midodrine given by primary nurse prior to initiation of HD. UF set 2L for now will titrate if BP tolerates.

## 2024-12-22 NOTE — PT/OT/SLP PROGRESS
Occupational Therapy   Treatment    Name: Juan Carlos Yoo Sr.  MRN: [id number]  Admitting Diagnosis:  Decompensated cirrhosis       Recommendations:     Discharge Recommendations: Moderate Intensity Therapy  Discharge Equipment Recommendations:  walker, rolling, wheelchair, bedside commode, grab bar, bath bench  Barriers to discharge:  Decreased caregiver support    Assessment:     Juan Carlos Yoo Sr. is a 69 y.o. male with a medical diagnosis of Decompensated cirrhosis. Performance deficits affecting function are weakness, impaired endurance, decreased coordination, impaired self care skills, impaired functional mobility, gait instability, impaired balance, decreased safety awareness, decreased lower extremity function, decreased ROM.     Rehab Prognosis:  Good; patient would benefit from acute skilled OT services to address these deficits and reach maximum level of function.       Plan:     Patient to be seen 4 x/week to address the above listed problems via self-care/home management, therapeutic activities, therapeutic exercises, neuromuscular re-education  Plan of Care Expires: 12/27/24  Plan of Care Reviewed with: patient    Subjective     Pain/Comfort:  Pain Rating 1: 0/10    Objective:     Communicated with: rn prior to session.  Patient found supine with Trialysis upon OT entry to room.    General Precautions: Standard, fall    Orthopedic Precautions:N/A  Braces: N/A  Respiratory Status: Room air     Occupational Performance:     Bed Mobility:    Patient completed Scooting/Bridging with stand by assistance  Patient completed Supine to Sit with stand by assistance     Functional Mobility/Transfers:  Patient completed Sit <> Stand Transfer with contact guard assistance  with  rolling walker   Patient completed Bed <> Chair Transfer using Step Transfer technique with stand by assistance with rolling walker  Functional Mobility: Pt walked from the bed>sink>100' in the halls>chair with SBA using a RW.    Activities of  Daily Living:  Grooming: stand by assistance standing at the sink.    [facility] 6 Click ADL: 17    Treatment & Education:  Discussed OT POC and progress.    Patient left up in chair with all lines intact and call button in reach    GOALS:   Multidisciplinary Problems       Occupational Therapy Goals          Problem: Occupational Therapy    Goal Priority Disciplines Outcome Interventions   Occupational Therapy Goal     OT, PT/OT Progressing    Description: Goals to be met by: 12/25/24     Patient will increase functional independence with ADLs by performing:    UE Dressing with Set-up Assistance.  LE Dressing with Stand-by Assistance.  Grooming while standing at sink with Stand-by Assistance. Met 12/17  Revised to (S).  Toileting from toilet with Stand-by Assistance for hygiene and clothing management.   Step transfer: with SBA and use of LRAD  Supine to sit with SBA. Met 12/17  Revised to (I).  Toilet transfer to toilet with SBA and use of LRAD.  Independent with HEP to BUE to improve ROM                         Time Tracking:     OT Date of Treatment: 12/22/24  OT Start Time: 1023  OT Stop Time: 1046  OT Total Time (min): 23 min    Billable Minutes:Self Care/Home Management 10  Therapeutic Activity 13    OT/EDWARDO: OT     Number of EDWARDO visits since last OT visit: 1    12/22/2024

## 2024-12-22 NOTE — PLAN OF CARE
Problem: Fall Injury Risk  Goal: Absence of Fall and Fall-Related Injury  Outcome: Progressing     Problem: Liver Failure  Goal: Optimal Coping with Liver Failure  Outcome: Progressing  Goal: Absence of Infection Signs and Symptoms  Outcome: Progressing  Goal: Improved Oral Intake  Outcome: Progressing  Goal: Optimal Pain Control, Comfort and Function  Outcome: Progressing  Goal: Effective Oxygenation and Ventilation  Outcome: Progressing

## 2024-12-22 NOTE — ASSESSMENT & PLAN NOTE
Etoh Cirrhosis  Hx of HCC s/p Y90    MELD 3.0: 31 at 12/22/2024  6:25 AM  MELD-Na: 32 at 12/22/2024  6:25 AM  Calculated from:  Serum Creatinine: On dialysis. Using the maximum value.  Serum Sodium: 130 mmol/L at 12/22/2024  6:25 AM  Total Bilirubin: 3.1 mg/dL at 12/22/2024  6:25 AM  Serum Albumin: 2.2 g/dL at 12/22/2024  6:25 AM  INR(ratio): 1.6 at 12/22/2024  6:25 AM  Age at listing (hypothetical): 69 years  Sex: Male at 12/22/2024  6:25 AM    Cont lactulose    H/O HCC: treated with Y90 in June 2023.Last MRI abd w/o evidence of residual or recurrent disease 3/24/24. Due for repeat surveillance MRI as an outpatient.    Appreciate hepatology recommendations.   Current concerns regarding transplant include higher BMI, frailty / debility.       Discharge planning to SNF

## 2024-12-22 NOTE — PROGRESS NOTES
[facility] - Transplant Stepdown  Nephrology  Progress Note    Patient Name: Juan Carlos Yoo Sr.  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 Recent admission 10/4 at [facility] for decompensated hepatic cirrhosis where he was discharged with oral diuretic regimen including furosemide 60 mg BID and metolazone 2.5 mg daily, low salt diet with fluid restriction. It is unclear if patient is adherent to these medications or lifestyle modifications. W/u notable for anemia with hb 6.7 s/p 1 unit pRBC 11/20 and JAE on CKD w elevated BUN 49/creatinine 5.9, hyperkalemia (K 6.1>5.1 after shifting), bicarb 18, elevated lactate up to 3.5. Nephrology consulted for JAE with c/o HRS    Interval History: NAEON, BP remains low, s/p paracentesis   Plan for HD later today       Review of patient's allergies indicates:  No Known Allergies  Current Facility-Administered Medications   Medication Frequency    albuterol-ipratropium 2.5 mg-0.5 mg/3 mL nebulizer solution 3 mL Q6H PRN    aluminum-magnesium hydroxide-simethicone 200-200-20 mg/5 mL suspension 30 mL QID PRN    dextrose 10% bolus 125 mL 125 mL PRN    dextrose 10% bolus 250 mL 250 mL PRN    famotidine tablet 20 mg Daily    glucagon (human recombinant) injection 1 mg PRN    glucose chewable tablet 16 g PRN    glucose chewable tablet 24 g PRN    hydrocortisone tablet 10 mg Q24H    Followed by    [START ON 12/24/2024] hydrocortisone tablet 5 mg Q24H    hydrocortisone tablet 5 mg Q24H     lactulose 20 gram/30 mL solution Soln 30 g TID    melatonin tablet 6 mg Nightly PRN    metoprolol tartrate (LOPRESSOR) split tablet 12.5 mg BID    miconazole nitrate 2% ointment BID    midodrine tablet 15 mg PRN    midodrine tablet 20 mg Q8H    midodrine tablet 20 mg Daily PRN    naloxone 0.4 mg/mL injection 0.02 mg PRN    ondansetron injection 4 mg Q8H PRN    polyethylene glycol packet 17 g BID PRN    simethicone chewable tablet 80 mg QID PRN    sodium chloride 0.9% flush 10 mL PRN    tamsulosin 24 hr capsule 0.8 mg QHS       Objective:     Vital Signs (Most Recent):  Temp: 97.9 °F (36.6 °C) (12/20/24 0759)  Pulse: 80 (12/20/24 0759)  Resp: 20 (12/20/24 0759)  BP: (!) 107/59 (12/20/24 0759)  SpO2: 96 % (12/20/24 0759) Vital Signs (24h Range):  Temp:  [97.5 °F (36.4 °C)-98.1 °F (36.7 °C)] 97.9 °F (36.6 °C)  Pulse:  [] 80  Resp:  [17-20] 20  SpO2:  [94 %-100 %] 96 %  BP: ()/(53-64) 107/59     Weight: (!) 140.6 kg (310 lb) (12/20/24 0519)  Body mass index is 40.9 kg/m².  Body surface area is 2.69 meters squared.    I/O last 3 completed shifts:  In: 1440 [P.O.:1440]  Out: 130 [Urine:130]     Physical Exam  Constitutional:       General: He is not in acute distress.     Appearance: He is obese. He is ill-appearing. He is not toxic-appearing.   HENT:      Head: Normocephalic and atraumatic.   Eyes:      Extraocular Movements: Extraocular movements intact.      Pupils: Pupils are equal, round, and reactive to light.   Cardiovascular:      Rate and Rhythm: Normal rate.   Pulmonary:      Effort: Pulmonary effort is normal. No respiratory distress.   Abdominal:      General: There is distension.      Tenderness: There is no abdominal tenderness. There is no guarding.   Musculoskeletal:         General: Swelling present.      Right lower leg: Edema present.      Left lower leg: Edema present.   Skin:     Coloration: Skin is pale. Skin is not jaundiced.   Neurological:      General: No focal deficit present.       Mental Status: He is alert and oriented to person, place, and time.          Significant Labs:  BMP:   Recent Labs   Lab 12/19/24  0630 12/20/24  0641    87   * 130*   K 4.1 3.4*    102   CO2 22* 19*   BUN 17 22   CREATININE 3.1* 3.5*   CALCIUM 8.5* 8.5*   MG 2.0  --      CBC:   Recent Labs   Lab 12/20/24  0641   WBC 6.01   RBC 2.90*   HGB 7.9*   HCT 24.1*   PLT 78*   MCV 83   MCH 27.2   MCHC 32.8        Significant Imaging:  Labs: Reviewed  Assessment/Plan:     Renal/  JAE (acute kidney injury)  JAE is likely due to pre-renal azotemia due to intravascular volume depletion secondary to decompensated hepatic cirrhosis with volume overload and acute tubular necrosis caused by hemodynamic instability, renal hypoperfusion, severe sepsis with GNR bacteremia. Initial presentation concerning for hepatorenal syndrome, transferred to MICU for vasopressors without success in reaching MAP goal 85-90 for treatment of HRS. Currently, patient with oligouric JAE more contributed by ATN as above.     Recommendations:      -HD session today, orders placed   -Strict I/Os  - Avoid hypotensive episodes            Thank you for your consult. I will follow-up with patient. Please contact us if you have any additional questions.    Irma eHrnandez MD  Nephrology  [facility] - Transplant Stepdown

## 2024-12-22 NOTE — SUBJECTIVE & OBJECTIVE
Interval History:       Status post hemodialysis last night, tolerated the procedure well, 3 hours duration, 1.5 L removed, blood pressure still on the low side  91/55    Review of patient's allergies indicates:  No Known Allergies  Current Facility-Administered Medications   Medication Frequency    albuterol-ipratropium 2.5 mg-0.5 mg/3 mL nebulizer solution 3 mL Q6H PRN    aluminum-magnesium hydroxide-simethicone 200-200-20 mg/5 mL suspension 30 mL QID PRN    dextrose 10% bolus 125 mL 125 mL PRN    dextrose 10% bolus 250 mL 250 mL PRN    famotidine tablet 20 mg Every other day    glucagon (human recombinant) injection 1 mg PRN    glucose chewable tablet 16 g PRN    glucose chewable tablet 24 g PRN    heparin (porcine) injection 1,000 Units Once    hydrocortisone tablet 10 mg Q24H    Followed by    [START ON 12/24/2024] hydrocortisone tablet 5 mg Q24H    lactulose 20 gram/30 mL solution Soln 30 g TID    melatonin tablet 6 mg Nightly PRN    metoprolol 12.5mg/1.25mL oral solution 6.25 mg BID    miconazole nitrate 2% ointment BID    midodrine tablet 15 mg PRN    midodrine tablet 20 mg Q8H    midodrine tablet 20 mg Daily PRN    naloxone 0.4 mg/mL injection 0.02 mg PRN    ondansetron injection 4 mg Q8H PRN    polyethylene glycol packet 17 g BID PRN    simethicone chewable tablet 80 mg QID PRN    sodium chloride 0.9% flush 10 mL PRN       Objective:     Vital Signs (Most Recent):  Temp: 98.4 °F (36.9 °C) (12/22/24 1140)  Pulse: 81 (12/22/24 1140)  Resp: 18 (12/22/24 1140)  BP: (!) 97/55 (12/22/24 1140)  SpO2: 98 % (12/22/24 1140) Vital Signs (24h Range):  Temp:  [97.5 °F (36.4 °C)-98.4 °F (36.9 °C)] 98.4 °F (36.9 °C)  Pulse:  [76-95] 81  Resp:  [18-20] 18  SpO2:  [95 %-100 %] 98 %  BP: ()/(48-60) 97/55     Weight: (!) 142.9 kg (315 lb) (12/21/24 0600)  Body mass index is 41.56 kg/m².  Body surface area is 2.71 meters squared.    I/O last 3 completed shifts:  In: 1020 [P.O.:520; Other:500]  Out: 2300 [Urine:300;  "Other:2000]     Physical Exam  Constitutional:       General: He is not in acute distress.     Appearance: He is ill-appearing. He is not toxic-appearing.   HENT:      Head: Normocephalic and atraumatic.   Eyes:      Extraocular Movements: Extraocular movements intact.      Pupils: Pupils are equal, round, and reactive to light.   Cardiovascular:      Rate and Rhythm: Normal rate.   Pulmonary:      Effort: No respiratory distress.      Breath sounds: No wheezing or rales.   Abdominal:      General: There is distension.      Tenderness: There is no abdominal tenderness. There is no guarding.   Musculoskeletal:      Right lower leg: Edema present.      Left lower leg: Edema present.   Skin:     Coloration: Skin is pale. Skin is not jaundiced.   Neurological:      Mental Status: He is oriented to person, place, and time.   Psychiatric:         Behavior: Behavior normal.          Significant Labs:  CBC:   Recent Labs   Lab 12/22/24  0625   WBC 7.93   RBC 2.95*   HGB 8.5*   HCT 24.4*   PLT 76*   MCV 83   MCH 28.8   MCHC 34.8     CMP:   Recent Labs   Lab 12/22/24  0625      CALCIUM 8.3*   ALBUMIN 2.2*   PROT 5.6*   *   K 3.7   CO2 20*      BUN 20   CREATININE 2.6*   ALKPHOS 106   ALT 20   AST 38   BILITOT 3.1*     LFTs:   Recent Labs   Lab 12/22/24  0625   ALT 20   AST 38   ALKPHOS 106   BILITOT 3.1*   PROT 5.6*   ALBUMIN 2.2*     PTH: No results for input(s): "PTH" in the last 168 hours.  TSH: No results for input(s): "TSH" in the last 168 hours.  Recent Labs   Lab 12/19/24  1712   COLORU Yellow   SPECGRAV 1.015   PHUR 6.0   PROTEINUA 1+*   BACTERIA Occasional   NITRITE Negative   LEUKOCYTESUR 3+*   HYALINECASTS 0       "

## 2024-12-22 NOTE — ASSESSMENT & PLAN NOTE
JAE is likely due to pre-renal azotemia due to intravascular volume depletion secondary to decompensated hepatic cirrhosis with volume overload and acute tubular necrosis caused by hemodynamic instability, renal hypoperfusion, severe sepsis with GNR bacteremia. Initial presentation concerning for hepatorenal syndrome, transferred to MICU for vasopressors without success in reaching MAP goal 85-90 for treatment of HRS. Currently, patient with oligouric JAE more contributed by ATN as above.     Recommendations:      -HD session today, orders placed   -Strict I/Os  - Avoid hypotensive episodes

## 2024-12-23 LAB
ALBUMIN SERPL BCP-MCNC: 2 G/DL (ref 3.5–5.2)
ALP SERPL-CCNC: 97 U/L (ref 40–150)
ALT SERPL W/O P-5'-P-CCNC: 19 U/L (ref 10–44)
ANION GAP SERPL CALC-SCNC: 5 MMOL/L (ref 8–16)
AST SERPL-CCNC: 30 U/L (ref 10–40)
BASOPHILS # BLD AUTO: 0 K/UL (ref 0–0.2)
BASOPHILS NFR BLD: 0 % (ref 0–1.9)
BILIRUB SERPL-MCNC: 2.6 MG/DL (ref 0.1–1)
BUN SERPL-MCNC: 31 MG/DL (ref 8–23)
CALCIUM SERPL-MCNC: 8.3 MG/DL (ref 8.7–10.5)
CHLORIDE SERPL-SCNC: 101 MMOL/L (ref 95–110)
CO2 SERPL-SCNC: 23 MMOL/L (ref 23–29)
CREAT SERPL-MCNC: 3.5 MG/DL (ref 0.5–1.4)
DIFFERENTIAL METHOD BLD: ABNORMAL
EOSINOPHIL # BLD AUTO: 0.2 K/UL (ref 0–0.5)
EOSINOPHIL NFR BLD: 3.1 % (ref 0–8)
ERYTHROCYTE [DISTWIDTH] IN BLOOD BY AUTOMATED COUNT: 27.1 % (ref 11.5–14.5)
EST. GFR  (NO RACE VARIABLE): 17.9 ML/MIN/1.73 M^2
GLUCOSE SERPL-MCNC: 92 MG/DL (ref 70–110)
HCT VFR BLD AUTO: 23.1 % (ref 40–54)
HGB BLD-MCNC: 7.8 G/DL (ref 14–18)
IMM GRANULOCYTES # BLD AUTO: 0.11 K/UL (ref 0–0.04)
IMM GRANULOCYTES NFR BLD AUTO: 1.6 % (ref 0–0.5)
INR PPP: 1.5 (ref 0.8–1.2)
LYMPHOCYTES # BLD AUTO: 0.8 K/UL (ref 1–4.8)
LYMPHOCYTES NFR BLD: 11.1 % (ref 18–48)
MCH RBC QN AUTO: 27.7 PG (ref 27–31)
MCHC RBC AUTO-ENTMCNC: 33.8 G/DL (ref 32–36)
MCV RBC AUTO: 82 FL (ref 82–98)
MONOCYTES # BLD AUTO: 1.6 K/UL (ref 0.3–1)
MONOCYTES NFR BLD: 22.3 % (ref 4–15)
NEUTROPHILS # BLD AUTO: 4.3 K/UL (ref 1.8–7.7)
NEUTROPHILS NFR BLD: 61.9 % (ref 38–73)
NRBC BLD-RTO: 0 /100 WBC
PHOSPHATE SERPL-MCNC: 2.8 MG/DL (ref 2.7–4.5)
PLATELET # BLD AUTO: 76 K/UL (ref 150–450)
PMV BLD AUTO: ABNORMAL FL (ref 9.2–12.9)
POTASSIUM SERPL-SCNC: 3.4 MMOL/L (ref 3.5–5.1)
PROT SERPL-MCNC: 5.3 G/DL (ref 6–8.4)
PROTHROMBIN TIME: 16.1 SEC (ref 9–12.5)
RBC # BLD AUTO: 2.82 M/UL (ref 4.6–6.2)
SODIUM SERPL-SCNC: 129 MMOL/L (ref 136–145)
WBC # BLD AUTO: 7 K/UL (ref 3.9–12.7)

## 2024-12-23 PROCEDURE — 36415 COLL VENOUS BLD VENIPUNCTURE: CPT | Performed by: INTERNAL MEDICINE

## 2024-12-23 PROCEDURE — 25000003 PHARM REV CODE 250: Performed by: HOSPITALIST

## 2024-12-23 PROCEDURE — 97110 THERAPEUTIC EXERCISES: CPT | Mod: CO

## 2024-12-23 PROCEDURE — 63600175 PHARM REV CODE 636 W HCPCS: Performed by: HOSPITALIST

## 2024-12-23 PROCEDURE — 20600001 HC STEP DOWN PRIVATE ROOM

## 2024-12-23 PROCEDURE — 85610 PROTHROMBIN TIME: CPT | Performed by: INTERNAL MEDICINE

## 2024-12-23 PROCEDURE — 25000003 PHARM REV CODE 250: Performed by: INTERNAL MEDICINE

## 2024-12-23 PROCEDURE — 97530 THERAPEUTIC ACTIVITIES: CPT | Mod: CQ

## 2024-12-23 PROCEDURE — 85025 COMPLETE CBC W/AUTO DIFF WBC: CPT

## 2024-12-23 PROCEDURE — 25000003 PHARM REV CODE 250: Performed by: STUDENT IN AN ORGANIZED HEALTH CARE EDUCATION/TRAINING PROGRAM

## 2024-12-23 PROCEDURE — 84100 ASSAY OF PHOSPHORUS: CPT

## 2024-12-23 PROCEDURE — 80053 COMPREHEN METABOLIC PANEL: CPT

## 2024-12-23 PROCEDURE — 25000003 PHARM REV CODE 250

## 2024-12-23 RX ORDER — MUPIROCIN 20 MG/G
OINTMENT TOPICAL 2 TIMES DAILY
Status: COMPLETED | OUTPATIENT
Start: 2024-12-23 | End: 2024-12-28

## 2024-12-23 RX ORDER — MIDODRINE HYDROCHLORIDE 10 MG/1
15 TABLET ORAL EVERY 8 HOURS
Start: 2024-12-23 | End: 2024-12-27 | Stop reason: HOSPADM

## 2024-12-23 RX ORDER — FAMOTIDINE 20 MG/1
20 TABLET, FILM COATED ORAL EVERY OTHER DAY
Start: 2024-12-23 | End: 2025-12-23

## 2024-12-23 RX ORDER — CEFTRIAXONE 1 G/1
1 INJECTION, POWDER, FOR SOLUTION INTRAMUSCULAR; INTRAVENOUS
Status: DISCONTINUED | OUTPATIENT
Start: 2024-12-23 | End: 2024-12-27

## 2024-12-23 RX ORDER — LANOLIN ALCOHOL/MO/W.PET/CERES
1000 CREAM (GRAM) TOPICAL DAILY
Start: 2024-12-23

## 2024-12-23 RX ORDER — LACTULOSE 10 G/15ML
30 SOLUTION ORAL 3 TIMES DAILY
Start: 2024-12-23

## 2024-12-23 RX ORDER — MICONAZOLE NITRATE 2 G/100G
POWDER TOPICAL 2 TIMES DAILY
Status: DISCONTINUED | OUTPATIENT
Start: 2024-12-23 | End: 2024-12-31 | Stop reason: HOSPADM

## 2024-12-23 RX ADMIN — METOPROLOL TARTRATE 6.25 MG: 25 TABLET, FILM COATED ORAL at 09:12

## 2024-12-23 RX ADMIN — MUPIROCIN: 20 OINTMENT TOPICAL at 10:12

## 2024-12-23 RX ADMIN — MIDODRINE HYDROCHLORIDE 20 MG: 5 TABLET ORAL at 05:12

## 2024-12-23 RX ADMIN — MIDODRINE HYDROCHLORIDE 20 MG: 5 TABLET ORAL at 06:12

## 2024-12-23 RX ADMIN — METOPROLOL TARTRATE 6.25 MG: 25 TABLET, FILM COATED ORAL at 10:12

## 2024-12-23 RX ADMIN — MIDODRINE HYDROCHLORIDE 20 MG: 5 TABLET ORAL at 10:12

## 2024-12-23 RX ADMIN — MICONAZOLE NITRATE: 20 OINTMENT TOPICAL at 09:12

## 2024-12-23 RX ADMIN — LACTULOSE 30 G: 20 SOLUTION ORAL at 09:12

## 2024-12-23 RX ADMIN — LACTULOSE 30 G: 20 SOLUTION ORAL at 10:12

## 2024-12-23 RX ADMIN — LACTULOSE 30 G: 20 SOLUTION ORAL at 06:12

## 2024-12-23 RX ADMIN — CEFTRIAXONE 1 G: 1 INJECTION, POWDER, FOR SOLUTION INTRAMUSCULAR; INTRAVENOUS at 03:12

## 2024-12-23 RX ADMIN — HYDROCORTISONE 10 MG: 5 TABLET ORAL at 05:12

## 2024-12-23 RX ADMIN — MICONAZOLE NITRATE 2 % TOPICAL POWDER: at 10:12

## 2024-12-23 NOTE — ASSESSMENT & PLAN NOTE
Etoh Cirrhosis  Hx of HCC s/p Y90    MELD 3.0: 31 at 12/23/2024  7:15 AM  MELD-Na: 31 at 12/23/2024  7:15 AM  Calculated from:  Serum Creatinine: On dialysis. Using the maximum value.  Serum Sodium: 129 mmol/L at 12/23/2024  7:15 AM  Total Bilirubin: 2.6 mg/dL at 12/23/2024  7:15 AM  Serum Albumin: 2 g/dL at 12/23/2024  7:15 AM  INR(ratio): 1.5 at 12/23/2024  7:15 AM  Age at listing (hypothetical): 69 years  Sex: Male at 12/23/2024  7:15 AM    Cont lactulose    H/O HCC: treated with Y90 in June 2023.Last MRI abd w/o evidence of residual or recurrent disease 3/24/24. Due for repeat surveillance MRI as an outpatient.    Appreciate hepatology recommendations.   Current concerns regarding transplant include higher BMI, frailty / debility.       Discharge planning to SNF

## 2024-12-23 NOTE — PT/OT/SLP PROGRESS
"Physical Therapy Treatment    Patient Name:  Juan Carlos Yoo Sr.   MRN:  [id number]    Recommendations:     Discharge Recommendations: No Therapy Indicated  Discharge Equipment Recommendations: wheelchair, walker, rolling, bedside commode, grab bar, bath bench  Barriers to discharge: None    Assessment:     Juan Carlos Yoo Sr. is a 69 y.o. male admitted with a medical diagnosis of Decompensated cirrhosis.  He presents with the following impairments/functional limitations: weakness, impaired endurance, impaired self care skills, impaired functional mobility, gait instability, impaired balance, decreased safety awareness, impaired cardiopulmonary response to activity. Pt was agreeable to perform OOB activities but declined ambulation trial.     Rehab Prognosis: Good; patient would benefit from acute skilled PT services to address these deficits and reach maximum level of function.    Recent Surgery: * No surgery found *      Plan:     During this hospitalization, patient to be seen 4 x/week to address the identified rehab impairments via gait training, therapeutic activities, therapeutic exercises, neuromuscular re-education and progress toward the following goals:    Plan of Care Expires:  12/27/24    Subjective     Chief Complaint: "I'm just tired"  Patient/Family Comments/goals: None  Pain/Comfort:  Pain Rating 1: 0/10      Objective:     Communicated with RN  prior to session.  Patient found supine with Trialysis upon PT entry to room.     General Precautions: Standard, fall  Orthopedic Precautions: N/A  Braces: N/A  Respiratory Status: Room air     Functional Mobility:  Bed Mobility:     Rolling Right: supervision  Scooting: supervision  Supine to Sit: supervision  Transfers:     Sit to Stand:  contact guard assistance with rolling walker  Gait: 4' CGA w/ RW  Balance: Sitting: Independent; Standing: CGA      AM-PAC 6 CLICK MOBILITY  Turning over in bed (including adjusting bedclothes, sheets and blankets)?: 3  Sitting " down on and standing up from a chair with arms (e.g., wheelchair, bedside commode, etc.): 3  Moving from lying on back to sitting on the side of the bed?: 3  Moving to and from a bed to a chair (including a wheelchair)?: 3  Need to walk in hospital room?: 3  Climbing 3-5 steps with a railing?: 2  Basic Mobility Total Score: 17       Treatment & Education:  Pt educated on there purpose, goals and benefits of today's session. Pt informed on progress made and improved tolerance to activity today. Pt was given VC's throughout session for sequencing, hand placements and safety. Pt was encourage to follow commands to improve outcome of activities being performed.     Patient left up in chair with all lines intact, call button in reach, and RN notified..    GOALS:   Multidisciplinary Problems       Physical Therapy Goals          Problem: Physical Therapy    Goal Priority Disciplines Outcome Interventions   Physical Therapy Goal     PT, PT/OT Progressing    Description: Goals to be met by: 24     Patient will increase functional independence with mobility by performin. Supine to sit with Minimal Assistance  2. Sit to stand transfer with Stand By Assistance  3. Bed to chair transfer with Stand By Assistance using LRAD  4. Gait  x 150 feet with Stand By Assistance using No LRAD.   5. Pt sit on EOB x 15 min with SBA and perform functional activities to increased functional mobility.                          Time Tracking:     PT Received On: 24  PT Start Time: 1056     PT Stop Time: 1111  PT Total Time (min): 15 min     Billable Minutes: Therapeutic Activity 15    Treatment Type: Treatment  PT/PTA: PTA     Number of PTA visits since last PT visit: 2     2024

## 2024-12-23 NOTE — CARE UPDATE
Patient with new onset red, well demarcated, nonpainful rash over LLQ. Afebrile and not tenderness on exam. Suspect erysipelas and will start on ceftriaxone 1 gm daily.     Herb Kraus DO  Staff Physician, Hospital Medicine.

## 2024-12-23 NOTE — PLAN OF CARE
CM called [facility] and spoke with Simon in intake [phone], authorization complete and Dialysis set up, Admit today to facility. CM waiting on Admit info and where to call report.

## 2024-12-23 NOTE — SUBJECTIVE & OBJECTIVE
Interval History: STEPAN, last HD 12/22, BP on the softer side.    Review of patient's allergies indicates:  No Known Allergies  Current Facility-Administered Medications   Medication Frequency    albuterol-ipratropium 2.5 mg-0.5 mg/3 mL nebulizer solution 3 mL Q6H PRN    aluminum-magnesium hydroxide-simethicone 200-200-20 mg/5 mL suspension 30 mL QID PRN    cefTRIAXone injection 1 g Q24H    dextrose 10% bolus 125 mL 125 mL PRN    dextrose 10% bolus 250 mL 250 mL PRN    famotidine tablet 20 mg Every other day    glucagon (human recombinant) injection 1 mg PRN    glucose chewable tablet 16 g PRN    glucose chewable tablet 24 g PRN    heparin (porcine) injection 1,000 Units Once    [START ON 12/24/2024] hydrocortisone tablet 5 mg Q24H    lactulose 20 gram/30 mL solution Soln 30 g TID    melatonin tablet 6 mg Nightly PRN    metoprolol 12.5mg/1.25mL oral solution 6.25 mg BID    miconazole NITRATE 2 % top powder BID    miconazole nitrate 2% ointment BID    midodrine tablet 15 mg PRN    midodrine tablet 20 mg Q8H    midodrine tablet 20 mg Daily PRN    naloxone 0.4 mg/mL injection 0.02 mg PRN    ondansetron injection 4 mg Q8H PRN    polyethylene glycol packet 17 g BID PRN    simethicone chewable tablet 80 mg QID PRN    sodium chloride 0.9% flush 10 mL PRN       Objective:     Vital Signs (Most Recent):  Temp: 97.9 °F (36.6 °C) (12/23/24 1154)  Pulse: 88 (12/23/24 1154)  Resp: 18 (12/23/24 1154)  BP: 106/70 (12/23/24 1200)  SpO2: (!) 94 % (12/23/24 1154) Vital Signs (24h Range):  Temp:  [97.6 °F (36.4 °C)-98.3 °F (36.8 °C)] 97.9 °F (36.6 °C)  Pulse:  [78-88] 88  Resp:  [18] 18  SpO2:  [94 %-99 %] 94 %  BP: ()/(49-70) 106/70     Weight: (!) 142.9 kg (315 lb) (12/21/24 0600)  Body mass index is 41.56 kg/m².  Body surface area is 2.71 meters squared.    I/O last 3 completed shifts:  In: 740 [P.O.:240; Other:500]  Out: 2000 [Other:2000]     Physical Exam  Constitutional:       Appearance: He is obese.   Pulmonary:       Effort: Pulmonary effort is normal. No respiratory distress.   Musculoskeletal:      Right lower leg: Edema present.      Left lower leg: Edema present.   Neurological:      General: No focal deficit present.      Mental Status: He is alert.          Significant Labs:  BMP:   Recent Labs   Lab 12/19/24  0630 12/20/24  0641 12/23/24  0715      < > 92   *   < > 129*   K 4.1   < > 3.4*      < > 101   CO2 22*   < > 23   BUN 17   < > 31*   CREATININE 3.1*   < > 3.5*   CALCIUM 8.5*   < > 8.3*   MG 2.0  --   --     < > = values in this interval not displayed.     CMP:   Recent Labs   Lab 12/23/24  0715   GLU 92   CALCIUM 8.3*   ALBUMIN 2.0*   PROT 5.3*   *   K 3.4*   CO2 23      BUN 31*   CREATININE 3.5*   ALKPHOS 97   ALT 19   AST 30   BILITOT 2.6*        Significant Imaging:  Labs: Reviewed

## 2024-12-23 NOTE — ASSESSMENT & PLAN NOTE
Baseline creatinine is  1.5 . Most recent creatinine and eGFR are listed below.    Recent Labs     12/21/24  0615 12/22/24  0625 12/23/24  0715   CREATININE 4.1* 2.6* 3.5*   EGFRNORACEVR 14.8* 25.6* 17.9*        Plan  - JAE is worsening. Will continue current treatment  - Avoid nephrotoxins and renally dose meds for GFR listed above  - Monitor urine output, serial BMP, and adjust therapy as needed    - Etiology includes volume overload, obstruction, hypotension, possible HRS  Baseline creatinine is 1.5. May require dialysis long term.   Patient tolerated intermittent HD well (12/12)    IR placed RIJ TDC 12/16. Working to get HD chair.

## 2024-12-23 NOTE — ASSESSMENT & PLAN NOTE
2/2 cirrhosis. See plan below    Recent Labs   Lab 12/23/24  0715   INR 1.5*     Received 3 doses of IV vitamin K.  End Stage Liver Disease precipitated coagulopathy  Heparin d/c d/t thrombocytopenia

## 2024-12-23 NOTE — PLAN OF CARE
Patient AAOx4. VSS on RA. Afebrile. Pt denied pain this shift. Pt w/ generalized bruising and facial contusions from previous fall. Pt w/ new redness to LLQ and L flank. +3-+4 swelling noted throughout body. Purewick in place, BM reported this shift- see I&O for details. Max assist. Bed locked and lowered, call bell in reach, nonskid socks on when OOB. Reviewed plan of care and fall precautions w/ pt- pt verbalized understanding. Reminded to call for assistance. Standard precautions maintained. Possible d/c to SNF today.

## 2024-12-23 NOTE — PT/OT/SLP PROGRESS
Occupational Therapy   Treatment    Name: Juan Carlos Yoo Sr.  MRN: [id number]  Admitting Diagnosis:  Decompensated cirrhosis       Recommendations:     Discharge Recommendations: Moderate Intensity Therapy  Discharge Equipment Recommendations:  wheelchair, walker, rolling, bedside commode, grab bar, bath bench  Barriers to discharge:  Decreased caregiver support    Assessment:     Juan Carlos Yoo Sr. is a 69 y.o. male with a medical diagnosis of Decompensated cirrhosis.  He presents with the fallowing performance deficits affecting function are weakness, impaired endurance, impaired self care skills, impaired functional mobility, gait instability, impaired balance, pain, impaired cardiopulmonary response to activity, edema, impaired cognition, decreased safety awareness. Patient agreeable to tx session, however; did required multiple attempts to see patient, patient noticed to have intermittent confusion today; needed to directed to task due to poor attention span. Patient continues to have limited activity tolerance due to pain and weakness from recent hospitalization. Patient would benefit from Moderate intensity therapy intervention to address over all functional decline with mobility task, endurance, and ADLs in order to return to PLOF.     Rehab Prognosis:  Good; patient would benefit from acute skilled OT services to address these deficits and reach maximum level of function.       Plan:     Patient to be seen 4 x/week to address the above listed problems via self-care/home management, therapeutic activities, therapeutic exercises, neuromuscular re-education  Plan of Care Expires: 12/27/24  Plan of Care Reviewed with: patient    Subjective     Chief Complaint:non verbalized   Patient/Family Comments/goals: to return to PLOF  Pain/Comfort:  Pain Rating 1: 0/10    Objective:     Communicated with: Nurse prior to session.  Patient found HOB elevated with Trialysis upon OT entry to room.    General Precautions: Standard,  fall    Orthopedic Precautions:N/A  Braces: N/A  Respiratory Status: Room air     Occupational Performance:       Functional Mobility/Transfers:  Patient performed seated therapeutic exercises to improve B UE  strength postural muscle activation and activity tolerance 1X10 reps   Biceps curls   Chest press  Shoulder press   Arm raises  Patient decline further mobility task due to stating that he was tired and she got back in bed and attempting to eat lunch.      Activities of Daily Living:  Decline ADLs due to stating that he already completed them in am.   *patient did used BCS earlier with assistance from PCT.    [facility] 6 Click ADL: 17    Treatment & Education:  Discussed OT POC and progress  Educated patient on the importance to continue to perform exercises in order to reduce stiffness and promote joint mobility and blood flow  Addressed patient's questions and concerns within [facility] scope of practice      Patient left HOB elevated with all lines intact and call button in reach    GOALS:   Multidisciplinary Problems       Occupational Therapy Goals          Problem: Occupational Therapy    Goal Priority Disciplines Outcome Interventions   Occupational Therapy Goal     OT, PT/OT Progressing    Description: Goals to be met by: 12/25/24     Patient will increase functional independence with ADLs by performing:    UE Dressing with Set-up Assistance.  LE Dressing with Stand-by Assistance.  Grooming while standing at sink with Stand-by Assistance. Met 12/17  Revised to (S).  Toileting from toilet with Stand-by Assistance for hygiene and clothing management.   Step transfer: with SBA and use of LRAD  Supine to sit with SBA. Met 12/17  Revised to (I).  Toilet transfer to toilet with SBA and use of LRAD.  Independent with HEP to BUE to improve ROM                         Time Tracking:     OT Date of Treatment: 12/23/24  OT Start Time: 1305  OT Stop Time: 1316  OT Total Time (min): 11 min    Billable Minutes:Therapeutic  Exercise 11    OT/EDWARDO: EDWARDO     Number of EDWARDO visits since last OT visit: 1    12/23/2024

## 2024-12-23 NOTE — ASSESSMENT & PLAN NOTE
Anemia is likely due to chronic disease due to Chronic liver disease. Most recent hemoglobin and hematocrit are listed below.  Recent Labs     12/21/24  0615 12/22/24  0625 12/23/24  0715   HGB 7.8* 8.5* 7.8*   HCT 23.8* 24.4* 23.1*       Plan  - Monitor serial CBC: Daily  - Transfuse PRBC if patient becomes hemodynamically unstable, symptomatic or H/H drops below 7/21.  - Patient has received 1 units of PRBCs on 11/20, 11/21, 12/15  - Patient's anemia is currently stable  - Hb baseline 7-8. No obvious bleeding  - Eliquis held on admission.  DVT prophylaxis held.

## 2024-12-23 NOTE — PROGRESS NOTES
[facility] Medicine  Progress Note    Patient Name: Juan Carlos Yoo Sr.  MRN: [id number]  Patient Class: IP- Inpatient   Admission Date: 11/20/2024  Length of Stay: 33 days  Attending Physician: Glory Oliva MD  Primary Care Provider: Nyla Rios FNP        Subjective     Principal Problem:Decompensated cirrhosis        HPI:  69M with PMH of alcoholic cirrhosis, esophageal varices, Afib on eliquis, and HTN who presents for c/o worsening diffuse swelling as well as R arm pain/erythema. He was recently hospitalized 1 month ago at [facility] for volume overload in the setting of decompensated cirrhosis. He was treated with IV diuresis and discharged with adjustment to his diuretics, currently on lasix 60mg BID and metolazone 2.5mg every other week as per family. Patient reports diffuse swelling of his upper and lower extremities in addition to distended abdomen and worsening dyspnea, which he believes is due to recent medication adjustment. Family states he is non-compliant with low sodium diet and fluid restriction. Family states he has been compliant with diuretics, but rom't taken eliquis for 3 days due to issue getting refill. He also reports scratching right arm on door frame 3-4 days ago which has become red and painful after wrapping in in bandage. He claims to be compliant with medications, but is a poor historian. He follows with hepatology, not currently active on transplant list. He states he hasn't consumed alcohol for at least 9 months. Has c/o chills, but denies fever, cough, nausea, vomiting, chest pain, dysuria, hematuria, blood in stool. Workup in ED remarkable for VS: T 97.6 HR 94 RR 22 BP 95/56 O2 sat 97% on RA. Hb 6.7, MCV 75, Plt 81, PT/INR 22.6/2.2, Na 128, K 6.1, BUN/Cr 49/5.7, Alb 2.8, AST/ALT 35/9, Ammonia 104, , Trop neg, LA 2.9, CXR: Cardiac size is enlarged similar to prior.  No large volume of pleural fluid noted although there is mild blunting of the  right costophrenic angle which may relate to a small amount of pleural fluid.  Suspected right basilar opacity, to be correlated clinically for infection. RUE venous doppler: No thrombus in central veins of the right upper extremity. Patient received vanc/zosyn and lasix 80mg IVP in ED and will be admitted to hospital medicine service for further management.    Overview/Hospital Course:  69 y.o. male with history of EtOH use disorder, EtOH cirrhosis, HCC s/p y90, morbid obesity BMI 44, HTN, and Afib who presents with severe anasarca and JAE.      Appreciate hepatology and nephrology recommendations.  Diuretics were held because of concern for HRS.  Patient was started on albumin and midodrine per Nephrology recommendations for HRS.  Renal function continued to worsen and recommendation per Nephrology to transfer to ICU for maintaining goal map over 85 on Levophed.  ICU was notified and patient to be assessed to be transferred to ICU.     Patient also has Gram-positive cocci and Gram-negative rods in his blood now.  Possible sources could be got translocation and barrier related infection through skin as he has been significantly edematous and has been oozing.  Has been on vancomycin and Rocephin.  Id was consulted this morning.  Repeat blood cultures were obtained.     Continues to have anemia.  Hemoglobin dropped on 11/20 and was given 1 unit of packed red blood cells.  Did not respond appropriately.  Hemoglobin dropped again to 6.8 on 11/21 and was given 1 unit of packed red blood cells.  Hemoglobin again on 11/22 is 7.2.  No reported melena or hematochezia.  Patient was on Eliquis for atrial fibrillation prior to admission which was held.  Given history of esophageal varices and portal hypertensive gastropathy whereas also could be component of slow bleeding, under production due to CKD and hemolysis while also with decompensated cirrhosis.  Started on Protonix IV b.i.d. and octreotide.     Also clarified with  hepatology.  Given patient's current infection we will await ID recommendations however will be challenging to consider transplant evaluation at this time.  Current concerns regarding transplant include higher BMI, frailty / debility.      Goal clarification with the patient and family.  Patient at this time wants to be full code and continue with Levophed in ICU.  They would consider discussion with palliative care in a day or so if things do not get better.      In MICU patient started on Levophed, however titration remained difficult given tachycardic response from the patient.      11/24 Trialysis and arterial lines placed overnight and currently on levo and norepi. SLED today.     11/25 Boo dc. Increased midodrine. Continue steroids until d/c pressors. Discussed with Nephrology about our concern for sustained use of pressors to achieve their MAP goals of 85. Will follow up tomorrow.      11/26 Platelets 48. Repeat 38 confirming thrombocytopenia. Concern for HIT. D/c heparin. Increased lactulose dosing. Bladder scan revealed urine in bladder. Boo placed. Off vaso. Continuing levo. Maintain MAP goals 80. PT/OT consulted.      11/27 MAP goal 75-80. Heparin antibody result pending.      11/28 Pt with abdominal pain, decreased bowel movements, emesis. Lactic acid 2.9 and repeat 2.7. Less concerned for mesenteric ischemia and more of a concern was for SBO. NG tube placed with subsequent suction of 500mL fluid. Abdomen less distended after placement and pt subsequently had bowel movement. MAP Goal of 60 with plan to come off pressors entirely and switch to dialysis after permacath, as he is not  liver transplant eligible at this time.      11/29 Pt with ileus. Clear liquids for now. Platelets 24. HIT versus zosyn induced? Peripheral smear sent. Zosyn changed to kaylah. Consent and transfuse 1U. GOC conversation today. Pt opting for long term dialysis.      11/30 naeon. Started back on heparin. One time dose of 120mg  lasix today. Hold off SLED/SCUF today and give IV lasix 120 mg once; plan for SLED/SCUF tomorrow      12/1 Patient is becoming more dependent on dialysis. Not a current liver transplant candidate. Still complaining of abdominal pain after discontinuing the NGT. AXR with evidence of dilated bowel loops. Brown bomb administered. The days following administration of the brown bomb, patient had more frequent bowel movements and was able to pass flatulence. Constipation eventually resolved.   Given the need for pressor support, he was worked up for adrenal insufficiency which was positive. Consequently, he was started on steroids for adrenal insufficiency. His blood pressures improved, however, he still required pressor support, particularly during dialysis. Pressor support was eventually weaned off. Patient was started on midodrine to help with BP. Nephrology trialed intermittent HD and patient seemed to tolerate it well. Nephrology recommended placement of tunneled catheter for HD. Patient was medically stable for step down to the hospital medicine floors.     Patient underwent testing with cosyntropin stimulation test, results are not compatible with adrenal insufficiency, cont hydrocortisone taper. Patient tolerating HD. IR placed RIJ TDC 12/16. Discharge planning to SNF.    12/19- patient was walking to the bathroom and had a mechanical fall with a his foot stumbling and hit his face of the ledge in the bathroom.  Although he walked with a walker and had nursing staff to help however could not be controlled.  Small contusion of the right glabella and the nose bridge.  CT head without any acute fractures.  Also noting abdominal distention.  We will request a paracentesis.  CT abdomen without any concern for hematoma.    12/20- accepted for nursing facility however awaiting for hemodialysis chair.  Given this is a new  admission possible anticipated discharge on Monday per SNF  and requesting attempts to see if  systolics can improve > 110.  we will lower his Lopressor if possible.  We will hold his Flomax.     12/21- SNF admission on Monday anticipated.     12/22- Blood pressures have been borderline. HD likely tomorrow.     Interval History:     NAEON. Abdominal cellulitis noted towards the left side, afebrile. Will santiago edges and monitor. Will do MRSA decolonization.     Review of Systems   Constitutional:  Negative for chills and fever.   HENT:  Negative for nosebleeds.    Respiratory:  Negative for cough.    Cardiovascular:  Positive for leg swelling.   Gastrointestinal:  Positive for abdominal distention. Negative for abdominal pain and diarrhea.   Genitourinary:  Positive for scrotal swelling.   Skin:  Positive for pallor and wound.   Neurological:  Negative for dizziness and light-headedness.   Psychiatric/Behavioral:  Negative for confusion.    All other systems reviewed and are negative.    Objective:     Vital Signs (Most Recent):  Temp: 97.5 °F (36.4 °C) (12/23/24 1613)  Pulse: 81 (12/23/24 1613)  Resp: 18 (12/23/24 1613)  BP: (!) 113/57 (12/23/24 1613)  SpO2: 96 % (12/23/24 1613) Vital Signs (24h Range):  Temp:  [97.5 °F (36.4 °C)-98.1 °F (36.7 °C)] 97.5 °F (36.4 °C)  Pulse:  [78-88] 81  Resp:  [18] 18  SpO2:  [94 %-98 %] 96 %  BP: ()/(51-70) 113/57   Weight: (!) 142.9 kg (315 lb)  Body mass index is 41.56 kg/m².      Intake/Output Summary (Last 24 hours) at 12/23/2024 1659  Last data filed at 12/23/2024 1421  Gross per 24 hour   Intake 320 ml   Output 0 ml   Net 320 ml          Physical Exam  Vitals and nursing note reviewed.   Constitutional:       General: He is awake. He is not in acute distress.     Appearance: He is obese. He is ill-appearing.   HENT:      Head:      Comments: Contusion and small bump around right glabella.      Mouth/Throat:      Comments: Lower lip injury mostly healed  Eyes:      Extraocular Movements: Extraocular movements intact.      Conjunctiva/sclera: Conjunctivae normal.    Cardiovascular:      Rate and Rhythm: Normal rate.   Pulmonary:      Effort: Pulmonary effort is normal. No respiratory distress.   Abdominal:      General: There is distension.      Palpations: Abdomen is soft.      Tenderness: There is no abdominal tenderness.   Musculoskeletal:         General: No swelling or tenderness.      Right lower leg: Edema present.      Left lower leg: Edema present.      Comments: 2+ pitting edema in bilateral lower extremities   Skin:     General: Skin is warm.      Coloration: Skin is not jaundiced.      Findings: Bruising present.   Neurological:      Mental Status: He is alert and oriented to person, place, and time.      Motor: No weakness.   Psychiatric:         Behavior: Behavior normal.            Significant Labs:    CBC/Anemia Profile:  Recent Labs   Lab 12/22/24  0625 12/23/24  0715   WBC 7.93 7.00   HGB 8.5* 7.8*   HCT 24.4* 23.1*   PLT 76* 76*   MCV 83 82   RDW 27.2* 27.1*        Chemistries:  Recent Labs   Lab 12/22/24 0625 12/23/24 0715   * 129*   K 3.7 3.4*    101   CO2 20* 23   BUN 20 31*   CREATININE 2.6* 3.5*   CALCIUM 8.3* 8.3*   ALBUMIN 2.2* 2.0*   PROT 5.6* 5.3*   BILITOT 3.1* 2.6*   ALKPHOS 106 97   ALT 20 19   AST 38 30   PHOS 2.3* 2.8       All pertinent labs within the past 24 hours have been reviewed.    Significant Imaging:  I have reviewed all pertinent imaging results/findings within the past 24 hours.    Assessment and Plan     * Decompensated cirrhosis  Etoh Cirrhosis  Hx of HCC s/p Y90    MELD 3.0: 31 at 12/23/2024  7:15 AM  MELD-Na: 31 at 12/23/2024  7:15 AM  Calculated from:  Serum Creatinine: On dialysis. Using the maximum value.  Serum Sodium: 129 mmol/L at 12/23/2024  7:15 AM  Total Bilirubin: 2.6 mg/dL at 12/23/2024  7:15 AM  Serum Albumin: 2 g/dL at 12/23/2024  7:15 AM  INR(ratio): 1.5 at 12/23/2024  7:15 AM  Age at listing (hypothetical): 69 years  Sex: Male at 12/23/2024  7:15 AM    Cont lactulose    H/O HCC: treated with Y90 in  June 2023.Last MRI abd w/o evidence of residual or recurrent disease 3/24/24. Due for repeat surveillance MRI as an outpatient.    Appreciate hepatology recommendations.   Current concerns regarding transplant include higher BMI, frailty / debility.       Discharge planning to SNF       Morbid obesity  Body mass index is 40.08 kg/m². Morbid obesity complicates all aspects of disease management from diagnostic modalities to treatment. Weight loss encouraged and health benefits explained to patient.         Adrenal insufficiency  On going problem since admission, most likely multifactorial - component of liver dysfunction, HRS, sepsis on admission.  S/p pressors in ICU     Random cortisol was 6   Persistent hypotension thought to be adrenal insufficiency. Patient underwent testing with cosyntropin stimulation test, results are not compatible with adrenal insufficiency. appreciate endocrinology recommendations   Cont hydrocortisone taper, to complete 5mg on 12/27      Moderate protein-calorie malnutrition  Nutrition consulted. Most recent weight and BMI monitored-     Measurements:  Wt Readings from Last 1 Encounters:   12/13/24 (!) 137.8 kg (303 lb 12.7 oz)   Body mass index is 40.08 kg/m².    Patient has been screened and assessed by RD.    Malnutrition Type:  Context: acute illness or injury  Level: moderate    Malnutrition Characteristic Summary:  Energy Intake (Malnutrition): less than 75% for greater than 7 days  Subcutaneous Fat (Malnutrition): mild depletion  Muscle Mass (Malnutrition): mild depletion  Fluid Accumulation (Malnutrition): moderate to severe    Interventions/Recommendations (treatment strategy):  1.)      Hypotension        Edema  Volume mgmt with RRT      Debility  Patient with Acute on chronic debility due to chronic unspecified fatigue, age-related physical debility, and other reduced mobility. The patient's latest AMPAC (Activity Measure for Post Acute Care) Score is listed below.    AM-PAC  Score - How much help does the patient need for each activity listed  Basic Mobility Total Score: 17  Turning over in bed (including adjusting bedclothes, sheets and blankets)?: A little  Sitting down on and standing up from a chair with arms (e.g., wheelchair, bedside commode, etc.): A little  Moving from lying on back to sitting on the side of the bed?: A little  Moving to and from a bed to a chair (including a wheelchair)?: A little  Need to walk in hospital room?: A little  Climbing 3-5 steps with a railing?: A lot    Plan  - Progressive mobility protocol initated  - PT/OT consulted  - Fall precautions in place  - PT/OT recommending LI therapy. Planning for SNF      Advanced care planning/counseling discussion        Palliative care encounter  Goals of care, counseling/discussion  Advance Care Planning  Patient requests FULL CODE status and would like to continue current treatment      Abdominal pain        Gram-negative bacteremia  Blood cultures from admission with B. Diminuta, micrococcus luteus, lysinobacillus species. Seen by ID previously who recommended stopping antibiotics due to concern these were contaminants. Repeat blood cultures have been no growth. Has since been transferred to ICU with increased pressor requirement. Blood cultures repeated on 11/24 are no growth x 24 hours.   11/29 Switched from zosyn to meropenem due to concern of thrombocytopenia.   Finished meropenem course 12/8 @ 6 PM    Encephalopathy, metabolic  2/2 decompensated cirrhosis and JAE  Resolved    Hyponatremia  Hyponatremia is likely due to Cirrhosis. The patient's most recent sodium results are listed below.  Recent Labs     12/21/24  0615 12/22/24  0625 12/23/24  0715   * 130* 129*       Plan  - Correct the sodium by 4-6mEq in 24 hours.   - Appreciate nephrology recommendations  - Monitor sodium daily  - Patient hyponatremia is stable  - Mgmt with HD    Thrombocytopenia  The likely etiology of thrombocytopenia is liver  disease. The patients 3 most recent labs are listed below.  Recent Labs     12/21/24  0615 12/22/24  0625 12/23/24  0715   PLT 89* 76* 76*       Plan  - Will transfuse if platelet count is <50k (if undergoing surgical procedure or have active bleeding).      Microcytic anemia  Anemia is likely due to chronic disease due to Chronic liver disease. Most recent hemoglobin and hematocrit are listed below.  Recent Labs     12/21/24  0615 12/22/24  0625 12/23/24  0715   HGB 7.8* 8.5* 7.8*   HCT 23.8* 24.4* 23.1*       Plan  - Monitor serial CBC: Daily  - Transfuse PRBC if patient becomes hemodynamically unstable, symptomatic or H/H drops below 7/21.  - Patient has received 1 units of PRBCs on 11/20, 11/21, 12/15  - Patient's anemia is currently stable  - Hb baseline 7-8. No obvious bleeding  - Eliquis held on admission.  DVT prophylaxis held.    Stage 3a chronic kidney disease  ESRD on HD  Nephrology following  Will try to remove more fluid  Strict intake and output  Renally dose all medications  Avoid nephrotoxic agents  IR placed RIJ TDC 12/16  CM assisting with finding HD chair      JAE (acute kidney injury)  Baseline creatinine is  1.5 . Most recent creatinine and eGFR are listed below.    Recent Labs     12/21/24  0615 12/22/24  0625 12/23/24  0715   CREATININE 4.1* 2.6* 3.5*   EGFRNORACEVR 14.8* 25.6* 17.9*        Plan  - JAE is worsening. Will continue current treatment  - Avoid nephrotoxins and renally dose meds for GFR listed above  - Monitor urine output, serial BMP, and adjust therapy as needed    - Etiology includes volume overload, obstruction, hypotension, possible HRS  Baseline creatinine is 1.5. May require dialysis long term.   Patient tolerated intermittent HD well (12/12)    IR placed RIJ TDC 12/16. Working to get HD chair.    Atrial fibrillation  Patient has persistent (7 days or more) atrial fibrillation. Patient is currently in atrial fibrillation. [id number] Score: 1. The patients heart rate in the  last 24 hours is as follows:  Pulse  Min: 78  Max: 88       Plan  - Patient's afib is currently controlled  Holding home Eliquis in the setting of recent low blood counts, consider continue holding on DC .. Will discuss with patient regarding risks and benefits  Continue metoprolol 12.5 mg BID  - percy ttempt to change to liquid at 6.25 BID if possible and assess HR    Coagulopathy  2/2 cirrhosis. See plan below    Recent Labs   Lab 12/23/24  0715   INR 1.5*     Received 3 doses of IV vitamin K.  End Stage Liver Disease precipitated coagulopathy  Heparin d/c d/t thrombocytopenia          Goals of care, counseling/discussion  Advance Care Planning    Code Status  In light of the patients advanced and life limiting illness,I engaged the the patient and family in a voluntary conversation about the patient's preferences for care  at the very end of life. The patient wishes to have a natural, peaceful death.  Along those lines, the patient wishes to have CPR or other invasive treatments performed when his heart and/or breathing stops. I communicated to the patient and family. Continue FULL CODE.    A total of 15 min was spent on advance care planning, goals of care discussion, emotional support, formulating and communicating prognosis and exploring burden/benefit of various approaches of treatment. This discussion occurred on a fully voluntary basis with the verbal consent of the patient and/or family.           VTE Risk Mitigation (From admission, onward)           Ordered     heparin (porcine) injection 1,000 Units  Once         12/21/24 1812     Place sequential compression device  Until discontinued         11/22/24 1329     IP VTE HIGH RISK PATIENT  Once         11/20/24 1622                    Discharge Planning   AUTUMN: 12/24/2024     Code Status: Full Code   Medical Readiness for Discharge Date:   Discharge Plan A: Skilled Nursing Facility   Discharge Delays: (!) Dialysis Set-up            Please place  Justification for DME        Glory Oliva MD  Department of Hospital Medicine   [facility] - Transplant Stepdown     left-sided chest and left-sided flank

## 2024-12-23 NOTE — ASSESSMENT & PLAN NOTE
Patient has persistent (7 days or more) atrial fibrillation. Patient is currently in atrial fibrillation. [id number] Score: 1. The patients heart rate in the last 24 hours is as follows:  Pulse  Min: 78  Max: 88       Plan  - Patient's afib is currently controlled  Holding home Eliquis in the setting of recent low blood counts, consider continue holding on DC .. Will discuss with patient regarding risks and benefits  Continue metoprolol 12.5 mg BID  - percy ttempt to change to liquid at 6.25 BID if possible and assess HR

## 2024-12-23 NOTE — ASSESSMENT & PLAN NOTE
JAE is likely due to pre-renal azotemia due to intravascular volume depletion secondary to decompensated hepatic cirrhosis with volume overload and acute tubular necrosis caused by hemodynamic instability, renal hypoperfusion, severe sepsis with GNR bacteremia. Initial presentation concerning for hepatorenal syndrome, transferred to MICU for vasopressors without success in reaching MAP goal 85-90 for treatment of HRS. Currently, patient with oligouric JAE more contributed by ATN as above.     Recommendations:      - Renal functions are stable for now, will plan for next HD sssion tomorrow if BP tolerates.  - Avoid hypotensive episodes  - Strict I/Os  -  we will continue to monitor for signs of renal recovery

## 2024-12-23 NOTE — PLAN OF CARE
CM spoke to Medical Team this morning, Dr. Oliva and Dr. Terrazas patient not medically ready to discharge to [facility] today due to having low BP and not being able to do dialysis. Patient can discharge  to [facility] once BP controlled and WNL to withstand Outpatient dialysis. CM will continue to follow up. Plan is to attempt tomorrow. CM notified [facility] of update. Discharge Summary and updated progress notes should be faxed to [facility] at [phone]    Ham Carrillo RN, BSN  Case Management  [phone]

## 2024-12-23 NOTE — NURSING
Notified MD Priti of pt new onset of red, nonpainful rash over LLQ- wrapping around to left flank. MD to come assess shortly.

## 2024-12-23 NOTE — ASSESSMENT & PLAN NOTE
The likely etiology of thrombocytopenia is liver disease. The patients 3 most recent labs are listed below.  Recent Labs     12/21/24  0615 12/22/24  0625 12/23/24  0715   PLT 89* 76* 76*       Plan  - Will transfuse if platelet count is <50k (if undergoing surgical procedure or have active bleeding).

## 2024-12-23 NOTE — PLAN OF CARE
Pt admitted 11/20 with fluid overload and JAE. To MICU 11/23, stepped down 12/1. Pt now HD-dependent. Nephrology following, last HD 12/22 via R subclavian permcath. Plan for T/Th/Sat schedule once discharged. Creatinine 3.5 today. Pt is AAOx4. Flat affect. On RA. Vitals reviewed. BP runs soft, 90s/50s. Scheduled midodrine continued. Flank redness/rash noted. Outline per MD request. Redness noted on bilateral flanks, L > R. Rocephin started o/n. Pt had fall 12/19, facial bruising unchanged. Generalized bruising unchanged. BUE skin tears with foam dressings last changed 12/22. Pt with +3 to +4 edema to BLE. Pt is up to toilet with one-two person assistance. Purewick utilized. Remains free from fall so far today. Nonskid socks on. Call bell left within reach. Bed locked/lowest position. Pt verbalized understanding to call for needs/assistance. Possible discharge to SNF tomorrow.

## 2024-12-23 NOTE — ASSESSMENT & PLAN NOTE
On going problem since admission, most likely multifactorial - component of liver dysfunction, HRS, sepsis on admission.  S/p pressors in ICU     Random cortisol was 6   Persistent hypotension thought to be adrenal insufficiency. Patient underwent testing with cosyntropin stimulation test, results are not compatible with adrenal insufficiency. appreciate endocrinology recommendations   Cont hydrocortisone taper, to complete 5mg on 12/27

## 2024-12-23 NOTE — PROGRESS NOTES
[facility]  Nephrology  Progress Note    Patient Name: Juan Carlos Yoo Sr.  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 Recent admission 10/4 at [facility] for decompensated hepatic cirrhosis where he was discharged with oral diuretic regimen including furosemide 60 mg BID and metolazone 2.5 mg daily, low salt diet with fluid restriction. It is unclear if patient is adherent to these medications or lifestyle modifications. W/u notable for anemia with hb 6.7 s/p 1 unit pRBC 11/20 and JAE on CKD w elevated BUN 49/creatinine 5.9, hyperkalemia (K 6.1>5.1 after shifting), bicarb 18, elevated lactate up to 3.5. Nephrology consulted for JAE with c/o HRS    Interval History: NAEON, last HD 12/22, BP on the softer side.    Review of patient's allergies indicates:  No Known Allergies  Current Facility-Administered Medications   Medication Frequency    albuterol-ipratropium 2.5 mg-0.5 mg/3 mL nebulizer solution 3 mL Q6H PRN    aluminum-magnesium hydroxide-simethicone 200-200-20 mg/5 mL suspension 30 mL QID PRN    cefTRIAXone injection 1 g Q24H    dextrose 10% bolus 125 mL 125 mL PRN    dextrose 10% bolus 250 mL 250 mL PRN    famotidine tablet 20 mg Every other day    glucagon (human recombinant) injection 1 mg PRN    glucose chewable tablet 16 g PRN    glucose chewable tablet 24 g PRN    heparin (porcine) injection 1,000 Units Once    [START ON 12/24/2024] hydrocortisone tablet 5 mg Q24H    lactulose 20 gram/30  mL solution Soln 30 g TID    melatonin tablet 6 mg Nightly PRN    metoprolol 12.5mg/1.25mL oral solution 6.25 mg BID    miconazole NITRATE 2 % top powder BID    miconazole nitrate 2% ointment BID    midodrine tablet 15 mg PRN    midodrine tablet 20 mg Q8H    midodrine tablet 20 mg Daily PRN    naloxone 0.4 mg/mL injection 0.02 mg PRN    ondansetron injection 4 mg Q8H PRN    polyethylene glycol packet 17 g BID PRN    simethicone chewable tablet 80 mg QID PRN    sodium chloride 0.9% flush 10 mL PRN       Objective:     Vital Signs (Most Recent):  Temp: 97.9 °F (36.6 °C) (12/23/24 1154)  Pulse: 88 (12/23/24 1154)  Resp: 18 (12/23/24 1154)  BP: 106/70 (12/23/24 1200)  SpO2: (!) 94 % (12/23/24 1154) Vital Signs (24h Range):  Temp:  [97.6 °F (36.4 °C)-98.3 °F (36.8 °C)] 97.9 °F (36.6 °C)  Pulse:  [78-88] 88  Resp:  [18] 18  SpO2:  [94 %-99 %] 94 %  BP: ()/(49-70) 106/70     Weight: (!) 142.9 kg (315 lb) (12/21/24 0600)  Body mass index is 41.56 kg/m².  Body surface area is 2.71 meters squared.    I/O last 3 completed shifts:  In: 740 [P.O.:240; Other:500]  Out: 2000 [Other:2000]     Physical Exam  Constitutional:       Appearance: He is obese.   Pulmonary:      Effort: Pulmonary effort is normal. No respiratory distress.   Musculoskeletal:      Right lower leg: Edema present.      Left lower leg: Edema present.   Neurological:      General: No focal deficit present.      Mental Status: He is alert.          Significant Labs:  BMP:   Recent Labs   Lab 12/19/24  0630 12/20/24  0641 12/23/24  0715      < > 92   *   < > 129*   K 4.1   < > 3.4*      < > 101   CO2 22*   < > 23   BUN 17   < > 31*   CREATININE 3.1*   < > 3.5*   CALCIUM 8.5*   < > 8.3*   MG 2.0  --   --     < > = values in this interval not displayed.     CMP:   Recent Labs   Lab 12/23/24  0715   GLU 92   CALCIUM 8.3*   ALBUMIN 2.0*   PROT 5.3*   *   K 3.4*   CO2 23      BUN 31*   CREATININE 3.5*   ALKPHOS 97   ALT 19   AST 30    BILITOT 2.6*        Significant Imaging:  Labs: Reviewed  Assessment/Plan:     Renal/  JAE (acute kidney injury)  JAE is likely due to pre-renal azotemia due to intravascular volume depletion secondary to decompensated hepatic cirrhosis with volume overload and acute tubular necrosis caused by hemodynamic instability, renal hypoperfusion, severe sepsis with GNR bacteremia. Initial presentation concerning for hepatorenal syndrome, transferred to MICU for vasopressors without success in reaching MAP goal 85-90 for treatment of HRS. Currently, patient with oligouric JAE more contributed by ATN as above.     Recommendations:      - Renal functions are stable for now, will plan for next HD sssion tomorrow if BP tolerates.  - Avoid hypotensive episodes  - Strict I/Os  -  we will continue to monitor for signs of renal recovery            Thank you for your consult. I will follow-up with patient. Please contact us if you have any additional questions.    Betty Terrazas MD  Nephrology  [facility]

## 2024-12-23 NOTE — PLAN OF CARE
12/23/24 0907   Post-Acute Status   Post-Acute Authorization Placement   Post-Acute Placement Status Set-up Complete/Auth obtained   Discharge Plan   Discharge Plan A Skilled Nursing Facility   Discharge Plan B Skilled Nursing Facility     CM to fax discharge orders to [facility] at [phone]. Spoke to Julianna [phone] will call with room number shortly.

## 2024-12-23 NOTE — ASSESSMENT & PLAN NOTE
Hyponatremia is likely due to Cirrhosis. The patient's most recent sodium results are listed below.  Recent Labs     12/21/24  0615 12/22/24  0625 12/23/24  0715   * 130* 129*       Plan  - Correct the sodium by 4-6mEq in 24 hours.   - Appreciate nephrology recommendations  - Monitor sodium daily  - Patient hyponatremia is stable  - Mgmt with HD

## 2024-12-23 NOTE — SUBJECTIVE & OBJECTIVE
Interval History:     NAEON. Abdominal cellulitis noted towards the left side, afebrile. Will santiago edges and monitor. Will do MRSA decolonization.     Review of Systems   Constitutional:  Negative for chills and fever.   HENT:  Negative for nosebleeds.    Respiratory:  Negative for cough.    Cardiovascular:  Positive for leg swelling.   Gastrointestinal:  Positive for abdominal distention. Negative for abdominal pain and diarrhea.   Genitourinary:  Positive for scrotal swelling.   Skin:  Positive for pallor and wound.   Neurological:  Negative for dizziness and light-headedness.   Psychiatric/Behavioral:  Negative for confusion.    All other systems reviewed and are negative.    Objective:     Vital Signs (Most Recent):  Temp: 97.5 °F (36.4 °C) (12/23/24 1613)  Pulse: 81 (12/23/24 1613)  Resp: 18 (12/23/24 1613)  BP: (!) 113/57 (12/23/24 1613)  SpO2: 96 % (12/23/24 1613) Vital Signs (24h Range):  Temp:  [97.5 °F (36.4 °C)-98.1 °F (36.7 °C)] 97.5 °F (36.4 °C)  Pulse:  [78-88] 81  Resp:  [18] 18  SpO2:  [94 %-98 %] 96 %  BP: ()/(51-70) 113/57   Weight: (!) 142.9 kg (315 lb)  Body mass index is 41.56 kg/m².      Intake/Output Summary (Last 24 hours) at 12/23/2024 1659  Last data filed at 12/23/2024 1421  Gross per 24 hour   Intake 320 ml   Output 0 ml   Net 320 ml          Physical Exam  Vitals and nursing note reviewed.   Constitutional:       General: He is awake. He is not in acute distress.     Appearance: He is obese. He is ill-appearing.   HENT:      Head:      Comments: Contusion and small bump around right glabella.      Mouth/Throat:      Comments: Lower lip injury mostly healed  Eyes:      Extraocular Movements: Extraocular movements intact.      Conjunctiva/sclera: Conjunctivae normal.   Cardiovascular:      Rate and Rhythm: Normal rate.   Pulmonary:      Effort: Pulmonary effort is normal. No respiratory distress.   Abdominal:      General: There is distension.      Palpations: Abdomen is soft.       Tenderness: There is no abdominal tenderness.   Musculoskeletal:         General: No swelling or tenderness.      Right lower leg: Edema present.      Left lower leg: Edema present.      Comments: 2+ pitting edema in bilateral lower extremities   Skin:     General: Skin is warm.      Coloration: Skin is not jaundiced.      Findings: Bruising present.   Neurological:      Mental Status: He is alert and oriented to person, place, and time.      Motor: No weakness.   Psychiatric:         Behavior: Behavior normal.            Significant Labs:    CBC/Anemia Profile:  Recent Labs   Lab 12/22/24 0625 12/23/24  0715   WBC 7.93 7.00   HGB 8.5* 7.8*   HCT 24.4* 23.1*   PLT 76* 76*   MCV 83 82   RDW 27.2* 27.1*        Chemistries:  Recent Labs   Lab 12/22/24 0625 12/23/24  0715   * 129*   K 3.7 3.4*    101   CO2 20* 23   BUN 20 31*   CREATININE 2.6* 3.5*   CALCIUM 8.3* 8.3*   ALBUMIN 2.2* 2.0*   PROT 5.6* 5.3*   BILITOT 3.1* 2.6*   ALKPHOS 106 97   ALT 20 19   AST 38 30   PHOS 2.3* 2.8       All pertinent labs within the past 24 hours have been reviewed.    Significant Imaging:  I have reviewed all pertinent imaging results/findings within the past 24 hours.

## 2024-12-23 NOTE — PROGRESS NOTES
[facility] Step[facility]  Wound Care    Patient Name:  Juan Carlos Yoo Sr.   MRN:  [id number]  Date: 12/23/2024  Diagnosis: Decompensated cirrhosis    History:     Past Medical History:   Diagnosis Date    Atrial fibrillation     Bulging disc     Cirrhosis     Colon polyp 12/29/2017    Rpt 5 yrs    EV (esophageal varices)     Hypertension 3/30/2023    Skin cancer 03/2017    Thrombocytopenia        Social History     Socioeconomic History    Marital status: Single   Occupational History     Employer: [employer]   Tobacco Use    Smoking status: Never    Smokeless tobacco: Never   Substance and Sexual Activity    Alcohol use: Not Currently     Alcohol/week: 0.0 standard drinks of alcohol    Drug use: No     Social Drivers of Health     Financial Resource Strain: Low Risk  (11/21/2024)    Overall Financial Resource Strain (CARDIA)     Difficulty of Paying Living Expenses: Not very hard   Food Insecurity: No Food Insecurity (11/21/2024)    Hunger Vital Sign     Worried About Running Out of Food in the Last Year: Never true     Ran Out of Food in the Last Year: Never true   Transportation Needs: No Transportation Needs (11/21/2024)    TRANSPORTATION NEEDS     Transportation : No   Physical Activity: Inactive (11/21/2024)    Exercise Vital Sign     Days of Exercise per Week: 0 days     Minutes of Exercise per Session: 0 min   Stress: Stress Concern Present (11/21/2024)    [place] [employer] - Occupational Stress Questionnaire     Feeling of Stress : To some extent   Housing Stability: Low Risk  (11/21/2024)    Housing Stability Vital Sign     Unable to Pay for Housing in the Last Year: No     Homeless in the Last Year: No       Precautions:     Allergies as of 11/20/2024    (No Known Allergies)       WOC Assessment Details/Treatment   Patient seen for wound care follow-up.  Chart reviewed for this encounter.  See flow sheet for findings.    Pt in bed and agreeable to care. Pt positioned on  his right side with maximum assistance for assessment. The sacrum and buttocks are intact, pink, dry and blanchable. Pt is on an immerse mattress and is wearing heel lift boots. Pt educated on the wound care and the importance of turning every 2 hours.    Recommendations:  - Buttocks: bedside nursing to cleanse with bath wipes, pat dry and apply zinc barrier cream (orange top) bid/prn  - [place] every 2 hours  - Heel lift boots      12/23/24 1035        Wound 12/13/24 1630 Moisture associated dermatitis Buttocks   Date First Assessed/Time First Assessed: 12/13/24 1630   Primary Wound Type: Moisture associated dermatitis  Location: Buttocks   Dressing Appearance Open to air   Drainage Amount None   Appearance Intact;Pink;Dry   Periwound Area Intact;Dry       Phil score: 17    Recommendations made to primary team for above plan via secure chat. Wound care will sign off, re-consult as needed.       12/23/2024

## 2024-12-24 LAB
ALBUMIN SERPL BCP-MCNC: 2.1 G/DL (ref 3.5–5.2)
ALP SERPL-CCNC: 99 U/L (ref 40–150)
ALT SERPL W/O P-5'-P-CCNC: 23 U/L (ref 10–44)
ANION GAP SERPL CALC-SCNC: 10 MMOL/L (ref 8–16)
AST SERPL-CCNC: 35 U/L (ref 10–40)
BASOPHILS # BLD AUTO: 0.06 K/UL (ref 0–0.2)
BASOPHILS NFR BLD: 0.8 % (ref 0–1.9)
BILIRUB SERPL-MCNC: 2.8 MG/DL (ref 0.1–1)
BUN SERPL-MCNC: 38 MG/DL (ref 8–23)
CALCIUM SERPL-MCNC: 8.6 MG/DL (ref 8.7–10.5)
CHLORIDE SERPL-SCNC: 102 MMOL/L (ref 95–110)
CO2 SERPL-SCNC: 20 MMOL/L (ref 23–29)
CREAT SERPL-MCNC: 3.8 MG/DL (ref 0.5–1.4)
DIFFERENTIAL METHOD BLD: ABNORMAL
EOSINOPHIL # BLD AUTO: 0.3 K/UL (ref 0–0.5)
EOSINOPHIL NFR BLD: 4.1 % (ref 0–8)
ERYTHROCYTE [DISTWIDTH] IN BLOOD BY AUTOMATED COUNT: 27.4 % (ref 11.5–14.5)
EST. GFR  (NO RACE VARIABLE): 16.2 ML/MIN/1.73 M^2
GLUCOSE SERPL-MCNC: 101 MG/DL (ref 70–110)
HCT VFR BLD AUTO: 22.9 % (ref 40–54)
HGB BLD-MCNC: 7.7 G/DL (ref 14–18)
IMM GRANULOCYTES # BLD AUTO: 0.1 K/UL (ref 0–0.04)
IMM GRANULOCYTES NFR BLD AUTO: 1.4 % (ref 0–0.5)
LYMPHOCYTES # BLD AUTO: 0.7 K/UL (ref 1–4.8)
LYMPHOCYTES NFR BLD: 9.3 % (ref 18–48)
MCH RBC QN AUTO: 27.7 PG (ref 27–31)
MCHC RBC AUTO-ENTMCNC: 33.6 G/DL (ref 32–36)
MCV RBC AUTO: 82 FL (ref 82–98)
MONOCYTES # BLD AUTO: 1.9 K/UL (ref 0.3–1)
MONOCYTES NFR BLD: 25.4 % (ref 4–15)
NEUTROPHILS # BLD AUTO: 4.3 K/UL (ref 1.8–7.7)
NEUTROPHILS NFR BLD: 59 % (ref 38–73)
NRBC BLD-RTO: 0 /100 WBC
PHOSPHATE SERPL-MCNC: 3 MG/DL (ref 2.7–4.5)
PLATELET # BLD AUTO: 86 K/UL (ref 150–450)
PMV BLD AUTO: ABNORMAL FL (ref 9.2–12.9)
POTASSIUM SERPL-SCNC: 3.1 MMOL/L (ref 3.5–5.1)
PROT SERPL-MCNC: 5.5 G/DL (ref 6–8.4)
RBC # BLD AUTO: 2.78 M/UL (ref 4.6–6.2)
SODIUM SERPL-SCNC: 132 MMOL/L (ref 136–145)
WBC # BLD AUTO: 7.31 K/UL (ref 3.9–12.7)

## 2024-12-24 PROCEDURE — 90935 HEMODIALYSIS ONE EVALUATION: CPT

## 2024-12-24 PROCEDURE — 25000003 PHARM REV CODE 250: Performed by: INTERNAL MEDICINE

## 2024-12-24 PROCEDURE — 85025 COMPLETE CBC W/AUTO DIFF WBC: CPT

## 2024-12-24 PROCEDURE — 63600175 PHARM REV CODE 636 W HCPCS

## 2024-12-24 PROCEDURE — 20600001 HC STEP DOWN PRIVATE ROOM

## 2024-12-24 PROCEDURE — 25000003 PHARM REV CODE 250: Performed by: STUDENT IN AN ORGANIZED HEALTH CARE EDUCATION/TRAINING PROGRAM

## 2024-12-24 PROCEDURE — 25000003 PHARM REV CODE 250

## 2024-12-24 PROCEDURE — 36415 COLL VENOUS BLD VENIPUNCTURE: CPT

## 2024-12-24 PROCEDURE — 63600175 PHARM REV CODE 636 W HCPCS: Performed by: HOSPITALIST

## 2024-12-24 PROCEDURE — 84100 ASSAY OF PHOSPHORUS: CPT

## 2024-12-24 PROCEDURE — 80053 COMPREHEN METABOLIC PANEL: CPT

## 2024-12-24 RX ORDER — SODIUM CHLORIDE 9 MG/ML
INJECTION, SOLUTION INTRAVENOUS ONCE
Status: COMPLETED | OUTPATIENT
Start: 2024-12-24 | End: 2024-12-24

## 2024-12-24 RX ORDER — HEPARIN SODIUM 1000 [USP'U]/ML
1000 INJECTION, SOLUTION INTRAVENOUS; SUBCUTANEOUS
Status: DISCONTINUED | OUTPATIENT
Start: 2024-12-24 | End: 2024-12-31 | Stop reason: HOSPADM

## 2024-12-24 RX ORDER — MIDODRINE HYDROCHLORIDE 5 MG/1
15 TABLET ORAL EVERY 8 HOURS
Status: DISCONTINUED | OUTPATIENT
Start: 2024-12-24 | End: 2024-12-31 | Stop reason: HOSPADM

## 2024-12-24 RX ADMIN — LACTULOSE 30 G: 20 SOLUTION ORAL at 08:12

## 2024-12-24 RX ADMIN — MIDODRINE HYDROCHLORIDE 20 MG: 5 TABLET ORAL at 09:12

## 2024-12-24 RX ADMIN — MIDODRINE HYDROCHLORIDE 20 MG: 5 TABLET ORAL at 05:12

## 2024-12-24 RX ADMIN — MICONAZOLE NITRATE 2 % TOPICAL POWDER: at 09:12

## 2024-12-24 RX ADMIN — METOPROLOL TARTRATE 6.25 MG: 25 TABLET, FILM COATED ORAL at 08:12

## 2024-12-24 RX ADMIN — MIDODRINE HYDROCHLORIDE 15 MG: 5 TABLET ORAL at 11:12

## 2024-12-24 RX ADMIN — LACTULOSE 30 G: 20 SOLUTION ORAL at 03:12

## 2024-12-24 RX ADMIN — FAMOTIDINE 20 MG: 20 TABLET ORAL at 08:12

## 2024-12-24 RX ADMIN — MICONAZOLE NITRATE 2 % TOPICAL POWDER: at 08:12

## 2024-12-24 RX ADMIN — MUPIROCIN: 20 OINTMENT TOPICAL at 08:12

## 2024-12-24 RX ADMIN — MUPIROCIN: 20 OINTMENT TOPICAL at 09:12

## 2024-12-24 RX ADMIN — HYDROCORTISONE 5 MG: 5 TABLET ORAL at 05:12

## 2024-12-24 RX ADMIN — MIDODRINE HYDROCHLORIDE 15 MG: 5 TABLET ORAL at 08:12

## 2024-12-24 RX ADMIN — HEPARIN SODIUM 1000 UNITS: 1000 INJECTION, SOLUTION INTRAVENOUS; SUBCUTANEOUS at 12:12

## 2024-12-24 RX ADMIN — SODIUM CHLORIDE: 9 INJECTION, SOLUTION INTRAVENOUS at 09:12

## 2024-12-24 RX ADMIN — CEFTRIAXONE 1 G: 1 INJECTION, POWDER, FOR SOLUTION INTRAMUSCULAR; INTRAVENOUS at 03:12

## 2024-12-24 NOTE — PROGRESS NOTES
RN entered pt's room for hourly rounding to find pt sitting in his feces. RN observed feces all over pt's hands and in fingernails. RN also noted feces-covered napkins on pt's bedside table. Pt is AAOx3. Incontinence care/linen change performed. Hygiene care reviewed with pt. Call bell left within reach, bed locked/lowest position.

## 2024-12-24 NOTE — SUBJECTIVE & OBJECTIVE
Interval History: Alert and oriented, no active complaints, HD session today.    Review of patient's allergies indicates:  No Known Allergies  Current Facility-Administered Medications   Medication Frequency    0.9% NaCl infusion Once    albuterol-ipratropium 2.5 mg-0.5 mg/3 mL nebulizer solution 3 mL Q6H PRN    aluminum-magnesium hydroxide-simethicone 200-200-20 mg/5 mL suspension 30 mL QID PRN    cefTRIAXone injection 1 g Q24H    dextrose 10% bolus 125 mL 125 mL PRN    dextrose 10% bolus 250 mL 250 mL PRN    famotidine tablet 20 mg Every other day    glucagon (human recombinant) injection 1 mg PRN    glucose chewable tablet 16 g PRN    glucose chewable tablet 24 g PRN    heparin (porcine) injection 1,000 Units Once    heparin (porcine) injection 1,000 Units PRN    hydrocortisone tablet 5 mg Q24H    lactulose 20 gram/30 mL solution Soln 30 g TID    melatonin tablet 6 mg Nightly PRN    metoprolol 12.5mg/1.25mL oral solution 6.25 mg BID    miconazole NITRATE 2 % top powder BID    midodrine tablet 15 mg PRN    midodrine tablet 20 mg Q8H    midodrine tablet 20 mg Daily PRN    mupirocin 2 % ointment BID    naloxone 0.4 mg/mL injection 0.02 mg PRN    ondansetron injection 4 mg Q8H PRN    polyethylene glycol packet 17 g BID PRN    simethicone chewable tablet 80 mg QID PRN    sodium chloride 0.9% bolus 250 mL 250 mL PRN    sodium chloride 0.9% flush 10 mL PRN       Objective:     Vital Signs (Most Recent):  Temp: 98.4 °F (36.9 °C) (12/24/24 0753)  Pulse: 81 (12/24/24 1130)  Resp: 14 (12/24/24 0753)  BP: (!) 118/58 (12/24/24 1130)  SpO2: 97 % (12/24/24 0753) Vital Signs (24h Range):  Temp:  [97.5 °F (36.4 °C)-98.4 °F (36.9 °C)] 98.4 °F (36.9 °C)  Pulse:  [81-94] 81  Resp:  [14-19] 14  SpO2:  [94 %-99 %] 97 %  BP: ()/(50-70) 118/58     Weight: (!) 142.9 kg (315 lb) (12/24/24 0400)  Body mass index is 41.56 kg/m².  Body surface area is 2.71 meters squared.    I/O last 3 completed shifts:  In: 560 [P.O.:560]  Out: 0       Physical Exam  Pulmonary:      Effort: Pulmonary effort is normal. No respiratory distress.   Musculoskeletal:         General: Swelling present.      Right lower leg: Edema present.      Left lower leg: Edema present.   Neurological:      General: No focal deficit present.      Mental Status: He is alert and oriented to person, place, and time.          Significant Labs:  BMP:   Recent Labs   Lab 12/19/24  0630 12/20/24  0641 12/24/24  0635      < > 101   *   < > 132*   K 4.1   < > 3.1*      < > 102   CO2 22*   < > 20*   BUN 17   < > 38*   CREATININE 3.1*   < > 3.8*   CALCIUM 8.5*   < > 8.6*   MG 2.0  --   --     < > = values in this interval not displayed.     CBC:   Recent Labs   Lab 12/24/24  0635   WBC 7.31   RBC 2.78*   HGB 7.7*   HCT 22.9*   PLT 86*   MCV 82   MCH 27.7   MCHC 33.6        Significant Imaging:  Labs: Reviewed

## 2024-12-24 NOTE — ASSESSMENT & PLAN NOTE
JAE is likely due to pre-renal azotemia due to intravascular volume depletion secondary to decompensated hepatic cirrhosis with volume overload and acute tubular necrosis caused by hemodynamic instability, renal hypoperfusion, severe sepsis with GNR bacteremia. Initial presentation concerning for hepatorenal syndrome, transferred to MICU for vasopressors without success in reaching MAP goal 85-90 for treatment of HRS. Currently, patient with oligouric JAE more contributed by ATN as above.     Recommendations:  - HD session today (Temp 35.5/3.5 hours/high calcium bath/Sequential UF)  - Avoid hypotensive episodes  - Strict I/Os  -  we will continue to monitor for signs of renal recovery

## 2024-12-24 NOTE — NURSING
3 hours of a 3.5 hour dialysis complete.  Dialyzer clotting with continuous venous alarms.  Blood returned.  Dr. Hernández notified.  Rec'd okay to not restart tx.    RIJ PC flushed, heparin locked, capped and taped.    Net UF 1.3L.      Midodrine given before and during tx.    Tolerated well.    Transported from [facility] to room 28243 via bed by transporters.

## 2024-12-24 NOTE — PLAN OF CARE
Patient is AAOx4.  Afebrile  RA  HD performed this shift.   Midodrine administered per order.   Call light remains within reach.   Bed is in lowest position with wheels locked.   Plan of care is ongoing.

## 2024-12-24 NOTE — PROGRESS NOTES
[facility]  Nephrology  Progress Note    Patient Name: Juan Carlos Yoo Sr.  MRN: [id number]  Admission Date: 11/20/2024  Hospital Length of Stay: 34 days  Attending Provider: Glory Oliva MD   Primary Care Physician: Nyla Rios FNP  Principal Problem:Decompensated cirrhosis    Subjective:     HPI: Juan Carlos Yoo is a 69 year old male with hx of decompensated hepatic cirrhosis due to alcohol use, HCC s/p Y90, esophageal varices, persistent afib on eliquis, HTN, CKD3a (baseline creatinine 1.2-1.4) admitted on 11/20 for sepsis likely 2/2 SSTI involving RUE and decompensated hepatic cirrhosis with signs of volume overload. Recent admission 10/4 at [facility] for decompensated hepatic cirrhosis where he was discharged with oral diuretic regimen including furosemide 60 mg BID and metolazone 2.5 mg daily, low salt diet with fluid restriction. It is unclear if patient is adherent to these medications or lifestyle modifications. W/u notable for anemia with hb 6.7 s/p 1 unit pRBC 11/20 and JAE on CKD w elevated BUN 49/creatinine 5.9, hyperkalemia (K 6.1>5.1 after shifting), bicarb 18, elevated lactate up to 3.5. Nephrology consulted for JAE with c/o HRS    Interval History: Alert and oriented, no active complaints, HD session today.    Review of patient's allergies indicates:  No Known Allergies  Current Facility-Administered Medications   Medication Frequency    0.9% NaCl infusion Once    albuterol-ipratropium 2.5 mg-0.5 mg/3 mL nebulizer solution 3 mL Q6H PRN    aluminum-magnesium hydroxide-simethicone 200-200-20 mg/5 mL suspension 30 mL QID PRN    cefTRIAXone injection 1 g Q24H    dextrose 10% bolus 125 mL 125 mL PRN    dextrose 10% bolus 250 mL 250 mL PRN    famotidine tablet 20 mg Every other day    glucagon (human recombinant) injection 1 mg PRN    glucose chewable tablet 16 g PRN    glucose chewable tablet 24 g PRN    heparin (porcine) injection 1,000 Units Once    heparin (porcine) injection 1,000  Units PRN    hydrocortisone tablet 5 mg Q24H    lactulose 20 gram/30 mL solution Soln 30 g TID    melatonin tablet 6 mg Nightly PRN    metoprolol 12.5mg/1.25mL oral solution 6.25 mg BID    miconazole NITRATE 2 % top powder BID    midodrine tablet 15 mg PRN    midodrine tablet 20 mg Q8H    midodrine tablet 20 mg Daily PRN    mupirocin 2 % ointment BID    naloxone 0.4 mg/mL injection 0.02 mg PRN    ondansetron injection 4 mg Q8H PRN    polyethylene glycol packet 17 g BID PRN    simethicone chewable tablet 80 mg QID PRN    sodium chloride 0.9% bolus 250 mL 250 mL PRN    sodium chloride 0.9% flush 10 mL PRN       Objective:     Vital Signs (Most Recent):  Temp: 98.4 °F (36.9 °C) (12/24/24 0753)  Pulse: 81 (12/24/24 1130)  Resp: 14 (12/24/24 0753)  BP: (!) 118/58 (12/24/24 1130)  SpO2: 97 % (12/24/24 0753) Vital Signs (24h Range):  Temp:  [97.5 °F (36.4 °C)-98.4 °F (36.9 °C)] 98.4 °F (36.9 °C)  Pulse:  [81-94] 81  Resp:  [14-19] 14  SpO2:  [94 %-99 %] 97 %  BP: ()/(50-70) 118/58     Weight: (!) 142.9 kg (315 lb) (12/24/24 0400)  Body mass index is 41.56 kg/m².  Body surface area is 2.71 meters squared.    I/O last 3 completed shifts:  In: 560 [P.O.:560]  Out: 0      Physical Exam  Pulmonary:      Effort: Pulmonary effort is normal. No respiratory distress.   Musculoskeletal:         General: Swelling present.      Right lower leg: Edema present.      Left lower leg: Edema present.   Neurological:      General: No focal deficit present.      Mental Status: He is alert and oriented to person, place, and time.          Significant Labs:  BMP:   Recent Labs   Lab 12/19/24  0630 12/20/24  0641 12/24/24  0635      < > 101   *   < > 132*   K 4.1   < > 3.1*      < > 102   CO2 22*   < > 20*   BUN 17   < > 38*   CREATININE 3.1*   < > 3.8*   CALCIUM 8.5*   < > 8.6*   MG 2.0  --   --     < > = values in this interval not displayed.     CBC:   Recent Labs   Lab 12/24/24  0635   WBC 7.31   RBC 2.78*   HGB 7.7*    HCT 22.9*   PLT 86*   MCV 82   MCH 27.7   MCHC 33.6        Significant Imaging:  Labs: Reviewed  Assessment/Plan:     Renal/  JAE (acute kidney injury)  JAE is likely due to pre-renal azotemia due to intravascular volume depletion secondary to decompensated hepatic cirrhosis with volume overload and acute tubular necrosis caused by hemodynamic instability, renal hypoperfusion, severe sepsis with GNR bacteremia. Initial presentation concerning for hepatorenal syndrome, transferred to MICU for vasopressors without success in reaching MAP goal 85-90 for treatment of HRS. Currently, patient with oligouric JAE more contributed by ATN as above.     Recommendations:  - HD session today (Temp 35.5/3.5 hours/high calcium bath/Sequential UF)  - Avoid hypotensive episodes  - Strict I/Os  -  we will continue to monitor for signs of renal recovery            Thank you for your consult. I will follow-up with patient. Please contact us if you have any additional questions.    Betty Terrazas MD  Nephrology  [facility]

## 2024-12-24 NOTE — PLAN OF CARE
Patient AAOx4. VSS on RA. Afebrile. Pt denied pain this shift. Pt w/ generalized bruising and facial contusions from previous fall. Redness noted to B/L lower abdomen and flanks- team aware. Redness on buttocks noted- barrier cream applied. +3/+4 swelling noted throughout body. Voids spontaneously, scrotum very edematous. BM reported this shift- see I&O for details. 2 person assist OOB. Bed locked and lowered, call bell in reach, nonskid socks on when OOB. Reviewed plan of care and fall precautions w/ pt- pt verbalized understanding. Reminded to call for assistance. Standard precautions maintained. Possible d/c to SNF today pending rounds. Possible HD today.

## 2024-12-24 NOTE — PROGRESS NOTES
[facility] Medicine  Progress Note    Patient Name: Juan Carlos Yoo Sr.  MRN: [id number]  Patient Class: IP- Inpatient   Admission Date: 11/20/2024  Length of Stay: 34 days  Attending Physician: Glory Oliva MD  Primary Care Provider: Nyla Rios FNP        Subjective     Principal Problem:Decompensated cirrhosis        HPI:  69M with PMH of alcoholic cirrhosis, esophageal varices, Afib on eliquis, and HTN who presents for c/o worsening diffuse swelling as well as R arm pain/erythema. He was recently hospitalized 1 month ago at [facility] for volume overload in the setting of decompensated cirrhosis. He was treated with IV diuresis and discharged with adjustment to his diuretics, currently on lasix 60mg BID and metolazone 2.5mg every other week as per family. Patient reports diffuse swelling of his upper and lower extremities in addition to distended abdomen and worsening dyspnea, which he believes is due to recent medication adjustment. Family states he is non-compliant with low sodium diet and fluid restriction. Family states he has been compliant with diuretics, but rom't taken eliquis for 3 days due to issue getting refill. He also reports scratching right arm on door frame 3-4 days ago which has become red and painful after wrapping in in bandage. He claims to be compliant with medications, but is a poor historian. He follows with hepatology, not currently active on transplant list. He states he hasn't consumed alcohol for at least 9 months. Has c/o chills, but denies fever, cough, nausea, vomiting, chest pain, dysuria, hematuria, blood in stool. Workup in ED remarkable for VS: T 97.6 HR 94 RR 22 BP 95/56 O2 sat 97% on RA. Hb 6.7, MCV 75, Plt 81, PT/INR 22.6/2.2, Na 128, K 6.1, BUN/Cr 49/5.7, Alb 2.8, AST/ALT 35/9, Ammonia 104, , Trop neg, LA 2.9, CXR: Cardiac size is enlarged similar to prior.  No large volume of pleural fluid noted although there is mild blunting of the  right costophrenic angle which may relate to a small amount of pleural fluid.  Suspected right basilar opacity, to be correlated clinically for infection. RUE venous doppler: No thrombus in central veins of the right upper extremity. Patient received vanc/zosyn and lasix 80mg IVP in ED and will be admitted to hospital medicine service for further management.    Overview/Hospital Course:  69 y.o. male with history of EtOH use disorder, EtOH cirrhosis, HCC s/p y90, morbid obesity BMI 44, HTN, and Afib who presents with severe anasarca and JAE.      Appreciate hepatology and nephrology recommendations.  Diuretics were held because of concern for HRS.  Patient was started on albumin and midodrine per Nephrology recommendations for HRS.  Renal function continued to worsen and recommendation per Nephrology to transfer to ICU for maintaining goal map over 85 on Levophed.  ICU was notified and patient to be assessed to be transferred to ICU.     Patient also has Gram-positive cocci and Gram-negative rods in his blood now.  Possible sources could be got translocation and barrier related infection through skin as he has been significantly edematous and has been oozing.  Has been on vancomycin and Rocephin.  Id was consulted this morning.  Repeat blood cultures were obtained.     Continues to have anemia.  Hemoglobin dropped on 11/20 and was given 1 unit of packed red blood cells.  Did not respond appropriately.  Hemoglobin dropped again to 6.8 on 11/21 and was given 1 unit of packed red blood cells.  Hemoglobin again on 11/22 is 7.2.  No reported melena or hematochezia.  Patient was on Eliquis for atrial fibrillation prior to admission which was held.  Given history of esophageal varices and portal hypertensive gastropathy whereas also could be component of slow bleeding, under production due to CKD and hemolysis while also with decompensated cirrhosis.  Started on Protonix IV b.i.d. and octreotide.     Also clarified with  hepatology.  Given patient's current infection we will await ID recommendations however will be challenging to consider transplant evaluation at this time.  Current concerns regarding transplant include higher BMI, frailty / debility.      Goal clarification with the patient and family.  Patient at this time wants to be full code and continue with Levophed in ICU.  They would consider discussion with palliative care in a day or so if things do not get better.      In MICU patient started on Levophed, however titration remained difficult given tachycardic response from the patient.      11/24 Trialysis and arterial lines placed overnight and currently on levo and norepi. SLED today.     11/25 Boo dc. Increased midodrine. Continue steroids until d/c pressors. Discussed with Nephrology about our concern for sustained use of pressors to achieve their MAP goals of 85. Will follow up tomorrow.      11/26 Platelets 48. Repeat 38 confirming thrombocytopenia. Concern for HIT. D/c heparin. Increased lactulose dosing. Bladder scan revealed urine in bladder. Boo placed. Off vaso. Continuing levo. Maintain MAP goals 80. PT/OT consulted.      11/27 MAP goal 75-80. Heparin antibody result pending.      11/28 Pt with abdominal pain, decreased bowel movements, emesis. Lactic acid 2.9 and repeat 2.7. Less concerned for mesenteric ischemia and more of a concern was for SBO. NG tube placed with subsequent suction of 500mL fluid. Abdomen less distended after placement and pt subsequently had bowel movement. MAP Goal of 60 with plan to come off pressors entirely and switch to dialysis after permacath, as he is not  liver transplant eligible at this time.      11/29 Pt with ileus. Clear liquids for now. Platelets 24. HIT versus zosyn induced? Peripheral smear sent. Zosyn changed to kaylah. Consent and transfuse 1U. GOC conversation today. Pt opting for long term dialysis.      11/30 naeon. Started back on heparin. One time dose of 120mg  lasix today. Hold off SLED/SCUF today and give IV lasix 120 mg once; plan for SLED/SCUF tomorrow      12/1 Patient is becoming more dependent on dialysis. Not a current liver transplant candidate. Still complaining of abdominal pain after discontinuing the NGT. AXR with evidence of dilated bowel loops. Brown bomb administered. The days following administration of the brown bomb, patient had more frequent bowel movements and was able to pass flatulence. Constipation eventually resolved.   Given the need for pressor support, he was worked up for adrenal insufficiency which was positive. Consequently, he was started on steroids for adrenal insufficiency. His blood pressures improved, however, he still required pressor support, particularly during dialysis. Pressor support was eventually weaned off. Patient was started on midodrine to help with BP. Nephrology trialed intermittent HD and patient seemed to tolerate it well. Nephrology recommended placement of tunneled catheter for HD. Patient was medically stable for step down to the hospital medicine floors.     Patient underwent testing with cosyntropin stimulation test, results are not compatible with adrenal insufficiency, cont hydrocortisone taper. Patient tolerating HD. IR placed RIJ TDC 12/16. Discharge planning to SNF.    12/19- patient was walking to the bathroom and had a mechanical fall with a his foot stumbling and hit his face of the ledge in the bathroom.  Although he walked with a walker and had nursing staff to help however could not be controlled.  Small contusion of the right glabella and the nose bridge.  CT head without any acute fractures.  Also noting abdominal distention.  We will request a paracentesis.  CT abdomen without any concern for hematoma.    12/20- accepted for nursing facility however awaiting for hemodialysis chair.  Given this is a new  admission possible anticipated discharge on Monday per SNF  and requesting attempts to see if  systolics can improve > 110.  we will lower his Lopressor if possible.  We will hold his Flomax.     12/21- SNF admission on Monday anticipated.     12/22- Blood pressures have been borderline. HD likely tomorrow.     12/23- Cellulitis of R sided abdomen wall, edges marked. No systemic signs. Continued on rocephin. Chlorhexidine bath ordered.     12/24- Cellulitis appears better, continue rocephin, HD completed today.      Interval History:     NAEON. Cellulitis of abdomen improving. HD completed today.     Review of Systems   Constitutional:  Negative for chills and fever.   HENT:  Negative for nosebleeds.    Respiratory:  Negative for cough.    Cardiovascular:  Positive for leg swelling.   Gastrointestinal:  Positive for abdominal distention. Negative for abdominal pain and diarrhea.   Genitourinary:  Positive for scrotal swelling.   Skin:  Positive for pallor and wound.   Neurological:  Negative for dizziness and light-headedness.   Psychiatric/Behavioral:  Negative for confusion.    All other systems reviewed and are negative.    Objective:     Vital Signs (Most Recent):  Temp: 98.4 °F (36.9 °C) (12/24/24 0753)  Pulse: 80 (12/24/24 1245)  Resp: 14 (12/24/24 0753)  BP: (!) 124/56 (12/24/24 1245)  SpO2: 97 % (12/24/24 0753) Vital Signs (24h Range):  Temp:  [97.5 °F (36.4 °C)-98.4 °F (36.9 °C)] 98.4 °F (36.9 °C)  Pulse:  [80-94] 80  Resp:  [14-19] 14  SpO2:  [96 %-99 %] 97 %  BP: ()/(50-66) 124/56   Weight: (!) 142.9 kg (315 lb)  Body mass index is 41.56 kg/m².      Intake/Output Summary (Last 24 hours) at 12/24/2024 1458  Last data filed at 12/24/2024 1245  Gross per 24 hour   Intake 840 ml   Output 1930 ml   Net -1090 ml          Physical Exam  Vitals and nursing note reviewed.   Constitutional:       General: He is awake. He is not in acute distress.     Appearance: He is obese. He is ill-appearing.   HENT:      Head:      Comments: Contusion and small bump around right glabella.      Mouth/Throat:       Comments: Lower lip injury mostly healed  Eyes:      Extraocular Movements: Extraocular movements intact.      Conjunctiva/sclera: Conjunctivae normal.   Cardiovascular:      Rate and Rhythm: Normal rate.   Pulmonary:      Effort: Pulmonary effort is normal. No respiratory distress.   Abdominal:      General: There is distension.      Palpations: Abdomen is soft.      Tenderness: There is no abdominal tenderness.   Musculoskeletal:         General: No swelling or tenderness.      Right lower leg: Edema present.      Left lower leg: Edema present.      Comments: 2+ pitting edema in bilateral lower extremities   Skin:     General: Skin is warm.      Coloration: Skin is not jaundiced.      Findings: Bruising present.   Neurological:      Mental Status: He is alert and oriented to person, place, and time.      Motor: No weakness.   Psychiatric:         Behavior: Behavior normal.            Significant Labs:    CBC/Anemia Profile:  Recent Labs   Lab 12/23/24  0715 12/24/24  0635   WBC 7.00 7.31   HGB 7.8* 7.7*   HCT 23.1* 22.9*   PLT 76* 86*   MCV 82 82   RDW 27.1* 27.4*        Chemistries:  Recent Labs   Lab 12/23/24  0715 12/24/24  0635   * 132*   K 3.4* 3.1*    102   CO2 23 20*   BUN 31* 38*   CREATININE 3.5* 3.8*   CALCIUM 8.3* 8.6*   ALBUMIN 2.0* 2.1*   PROT 5.3* 5.5*   BILITOT 2.6* 2.8*   ALKPHOS 97 99   ALT 19 23   AST 30 35   PHOS 2.8 3.0       All pertinent labs within the past 24 hours have been reviewed.    Significant Imaging:  I have reviewed all pertinent imaging results/findings within the past 24 hours.    Assessment and Plan     * Decompensated cirrhosis  Etoh Cirrhosis  Hx of HCC s/p Y90    MELD 3.0: 29 at 12/24/2024  6:35 AM  MELD-Na: 30 at 12/24/2024  6:35 AM  Calculated from:  Serum Creatinine: On dialysis. Using the maximum value.  Serum Sodium: 132 mmol/L at 12/24/2024  6:35 AM  Total Bilirubin: 2.8 mg/dL at 12/24/2024  6:35 AM  Serum Albumin: 2.1 g/dL at 12/24/2024  6:35  AM  INR(ratio): 1.5 at 12/23/2024  7:15 AM  Age at listing (hypothetical): 69 years  Sex: Male at 12/24/2024  6:35 AM    Cont lactulose    H/O HCC: treated with Y90 in June 2023.Last MRI abd w/o evidence of residual or recurrent disease 3/24/24. Due for repeat surveillance MRI as an outpatient.    Appreciate hepatology recommendations.   Current concerns regarding transplant include higher BMI, frailty / debility.       Discharge planning to SNF once dialysis set up with holiday schedule        Morbid obesity  Body mass index is 40.08 kg/m². Morbid obesity complicates all aspects of disease management from diagnostic modalities to treatment. Weight loss encouraged and health benefits explained to patient.         Adrenal insufficiency  On going problem since admission, most likely multifactorial - component of liver dysfunction, HRS, sepsis on admission.  S/p pressors in ICU     Random cortisol was 6   Persistent hypotension thought to be adrenal insufficiency. Patient underwent testing with cosyntropin stimulation test, results are not compatible with adrenal insufficiency. appreciate endocrinology recommendations   Cont hydrocortisone taper, to complete 5mg on 12/27      Moderate protein-calorie malnutrition  Nutrition consulted. Most recent weight and BMI monitored-     Measurements:  Wt Readings from Last 1 Encounters:   12/13/24 (!) 137.8 kg (303 lb 12.7 oz)   Body mass index is 40.08 kg/m².    Patient has been screened and assessed by RD.    Malnutrition Type:  Context: acute illness or injury  Level: moderate    Malnutrition Characteristic Summary:  Energy Intake (Malnutrition): less than 75% for greater than 7 days  Subcutaneous Fat (Malnutrition): mild depletion  Muscle Mass (Malnutrition): mild depletion  Fluid Accumulation (Malnutrition): moderate to severe    Interventions/Recommendations (treatment strategy):  1.)      Hypotension        Edema  Volume mgmt with RRT      Debility  Patient with Acute on  chronic debility due to chronic unspecified fatigue, age-related physical debility, and other reduced mobility. The patient's latest AMPAC (Activity Measure for Post Acute Care) Score is listed below.    AM-PAC Score - How much help does the patient need for each activity listed  Basic Mobility Total Score: 17  Turning over in bed (including adjusting bedclothes, sheets and blankets)?: A little  Sitting down on and standing up from a chair with arms (e.g., wheelchair, bedside commode, etc.): A little  Moving from lying on back to sitting on the side of the bed?: A little  Moving to and from a bed to a chair (including a wheelchair)?: A little  Need to walk in hospital room?: A little  Climbing 3-5 steps with a railing?: A lot    Plan  - Progressive mobility protocol initated  - PT/OT consulted  - Fall precautions in place  - PT/OT recommending LI therapy. Planning for SNF      Advanced care planning/counseling discussion        Palliative care encounter  Goals of care, counseling/discussion  Advance Care Planning  Patient requests FULL CODE status and would like to continue current treatment      Abdominal pain        Gram-negative bacteremia  Blood cultures from admission with B. Diminuta, micrococcus luteus, lysinobacillus species. Seen by ID previously who recommended stopping antibiotics due to concern these were contaminants. Repeat blood cultures have been no growth. Has since been transferred to ICU with increased pressor requirement. Blood cultures repeated on 11/24 are no growth x 24 hours.   11/29 Switched from zosyn to meropenem due to concern of thrombocytopenia.   Finished meropenem course 12/8 @ 6 PM    Encephalopathy, metabolic  2/2 decompensated cirrhosis and JAE  Resolved    Hyponatremia  Hyponatremia is likely due to Cirrhosis. The patient's most recent sodium results are listed below.  Recent Labs     12/22/24  0625 12/23/24  0715 12/24/24  0635   * 129* 132*       Plan  - Correct the sodium  by 4-6mEq in 24 hours.   - Appreciate nephrology recommendations  - Monitor sodium daily  - Patient hyponatremia is stable  - Mgmt with HD    Thrombocytopenia  The likely etiology of thrombocytopenia is liver disease. The patients 3 most recent labs are listed below.  Recent Labs     12/22/24  0625 12/23/24  0715 12/24/24  0635   PLT 76* 76* 86*       Plan  - Will transfuse if platelet count is <50k (if undergoing surgical procedure or have active bleeding).      Microcytic anemia  Anemia is likely due to chronic disease due to Chronic liver disease. Most recent hemoglobin and hematocrit are listed below.  Recent Labs     12/22/24  0625 12/23/24  0715 12/24/24  0635   HGB 8.5* 7.8* 7.7*   HCT 24.4* 23.1* 22.9*       Plan  - Monitor serial CBC: Daily  - Transfuse PRBC if patient becomes hemodynamically unstable, symptomatic or H/H drops below 7/21.  - Patient has received 1 units of PRBCs on 11/20, 11/21, 12/15  - Patient's anemia is currently stable  - Hb baseline 7-8. No obvious bleeding  - Eliquis held on admission.  DVT prophylaxis held.    Stage 3a chronic kidney disease  ESRD on HD  Nephrology following  Will try to remove more fluid  Strict intake and output  Renally dose all medications  Avoid nephrotoxic agents  IR placed RIJ TDC 12/16  CM assisting with finding HD chair      JAE (acute kidney injury)  Baseline creatinine is  1.5 . Most recent creatinine and eGFR are listed below.    Recent Labs     12/22/24  0625 12/23/24  0715 12/24/24  0635   CREATININE 2.6* 3.5* 3.8*   EGFRNORACEVR 25.6* 17.9* 16.2*        Plan  - JAE is worsening. Will continue current treatment  - Avoid nephrotoxins and renally dose meds for GFR listed above  - Monitor urine output, serial BMP, and adjust therapy as needed    - Etiology includes volume overload, obstruction, hypotension, possible HRS  Baseline creatinine is 1.5. May require dialysis long term.   Patient tolerated intermittent HD well (12/12)    IR placed RIJ TDC 12/16.  Working to get HD chair.    Atrial fibrillation  Patient has persistent (7 days or more) atrial fibrillation. Patient is currently in atrial fibrillation. [id number] Score: 1. The patients heart rate in the last 24 hours is as follows:  Pulse  Min: 78  Max: 88       Plan  - Patient's afib is currently controlled  Holding home Eliquis in the setting of recent low blood counts, consider continue holding on DC .. Will discuss with patient regarding risks and benefits  Continue metoprolol 12.5 mg BID  - percy ttempt to change to liquid at 6.25 BID if possible and assess HR    Coagulopathy  2/2 cirrhosis. See plan below    Received 3 doses of IV vitamin K.  End Stage Liver Disease precipitated coagulopathy  Heparin d/c d/t thrombocytopenia          Goals of care, counseling/discussion  Advance Care Planning    Code Status  In light of the patients advanced and life limiting illness,I engaged the the patient and family in a voluntary conversation about the patient's preferences for care  at the very end of life. The patient wishes to have a natural, peaceful death.  Along those lines, the patient wishes to have CPR or other invasive treatments performed when his heart and/or breathing stops. I communicated to the patient and family. Continue FULL CODE.    A total of 15 min was spent on advance care planning, goals of care discussion, emotional support, formulating and communicating prognosis and exploring burden/benefit of various approaches of treatment. This discussion occurred on a fully voluntary basis with the verbal consent of the patient and/or family.           VTE Risk Mitigation (From admission, onward)           Ordered     heparin (porcine) injection 1,000 Units  As needed (PRN)         12/24/24 0945     heparin (porcine) injection 1,000 Units  Once         12/21/24 1812     Place sequential compression device  Until discontinued         11/22/24 1329     IP VTE HIGH RISK PATIENT  Once         11/20/24 1622                     Discharge Planning   AUTUMN: 12/26/2024     Code Status: Full Code   Medical Readiness for Discharge Date:   Discharge Plan A: Skilled Nursing Facility   Discharge Delays: None known at this time            Please place Justification for DME        Glroy lOiva MD  Department of Hospital Medicine   [facility] - [facility]

## 2024-12-24 NOTE — SUBJECTIVE & OBJECTIVE
Interval History:     NAEON. Cellulitis of abdomen improving. HD completed today.     Review of Systems   Constitutional:  Negative for chills and fever.   HENT:  Negative for nosebleeds.    Respiratory:  Negative for cough.    Cardiovascular:  Positive for leg swelling.   Gastrointestinal:  Positive for abdominal distention. Negative for abdominal pain and diarrhea.   Genitourinary:  Positive for scrotal swelling.   Skin:  Positive for pallor and wound.   Neurological:  Negative for dizziness and light-headedness.   Psychiatric/Behavioral:  Negative for confusion.    All other systems reviewed and are negative.    Objective:     Vital Signs (Most Recent):  Temp: 98.4 °F (36.9 °C) (12/24/24 0753)  Pulse: 80 (12/24/24 1245)  Resp: 14 (12/24/24 0753)  BP: (!) 124/56 (12/24/24 1245)  SpO2: 97 % (12/24/24 0753) Vital Signs (24h Range):  Temp:  [97.5 °F (36.4 °C)-98.4 °F (36.9 °C)] 98.4 °F (36.9 °C)  Pulse:  [80-94] 80  Resp:  [14-19] 14  SpO2:  [96 %-99 %] 97 %  BP: ()/(50-66) 124/56   Weight: (!) 142.9 kg (315 lb)  Body mass index is 41.56 kg/m².      Intake/Output Summary (Last 24 hours) at 12/24/2024 1458  Last data filed at 12/24/2024 1245  Gross per 24 hour   Intake 840 ml   Output 1930 ml   Net -1090 ml          Physical Exam  Vitals and nursing note reviewed.   Constitutional:       General: He is awake. He is not in acute distress.     Appearance: He is obese. He is ill-appearing.   HENT:      Head:      Comments: Contusion and small bump around right glabella.      Mouth/Throat:      Comments: Lower lip injury mostly healed  Eyes:      Extraocular Movements: Extraocular movements intact.      Conjunctiva/sclera: Conjunctivae normal.   Cardiovascular:      Rate and Rhythm: Normal rate.   Pulmonary:      Effort: Pulmonary effort is normal. No respiratory distress.   Abdominal:      General: There is distension.      Palpations: Abdomen is soft.      Tenderness: There is no abdominal tenderness.    Musculoskeletal:         General: No swelling or tenderness.      Right lower leg: Edema present.      Left lower leg: Edema present.      Comments: 2+ pitting edema in bilateral lower extremities   Skin:     General: Skin is warm.      Coloration: Skin is not jaundiced.      Findings: Bruising present.   Neurological:      Mental Status: He is alert and oriented to person, place, and time.      Motor: No weakness.   Psychiatric:         Behavior: Behavior normal.            Significant Labs:    CBC/Anemia Profile:  Recent Labs   Lab 12/23/24  0715 12/24/24  0635   WBC 7.00 7.31   HGB 7.8* 7.7*   HCT 23.1* 22.9*   PLT 76* 86*   MCV 82 82   RDW 27.1* 27.4*        Chemistries:  Recent Labs   Lab 12/23/24  0715 12/24/24  0635   * 132*   K 3.4* 3.1*    102   CO2 23 20*   BUN 31* 38*   CREATININE 3.5* 3.8*   CALCIUM 8.3* 8.6*   ALBUMIN 2.0* 2.1*   PROT 5.3* 5.5*   BILITOT 2.6* 2.8*   ALKPHOS 97 99   ALT 19 23   AST 30 35   PHOS 2.8 3.0       All pertinent labs within the past 24 hours have been reviewed.    Significant Imaging:  I have reviewed all pertinent imaging results/findings within the past 24 hours.

## 2024-12-24 NOTE — ASSESSMENT & PLAN NOTE
2/2 cirrhosis. See plan below    Received 3 doses of IV vitamin K.  End Stage Liver Disease precipitated coagulopathy  Heparin d/c d/t thrombocytopenia

## 2024-12-24 NOTE — ASSESSMENT & PLAN NOTE
Baseline creatinine is  1.5 . Most recent creatinine and eGFR are listed below.    Recent Labs     12/22/24  0625 12/23/24  0715 12/24/24  0635   CREATININE 2.6* 3.5* 3.8*   EGFRNORACEVR 25.6* 17.9* 16.2*        Plan  - JAE is worsening. Will continue current treatment  - Avoid nephrotoxins and renally dose meds for GFR listed above  - Monitor urine output, serial BMP, and adjust therapy as needed    - Etiology includes volume overload, obstruction, hypotension, possible HRS  Baseline creatinine is 1.5. May require dialysis long term.   Patient tolerated intermittent HD well (12/12)    IR placed RIJ TDC 12/16. Working to get HD chair.

## 2024-12-24 NOTE — ASSESSMENT & PLAN NOTE
Anemia is likely due to chronic disease due to Chronic liver disease. Most recent hemoglobin and hematocrit are listed below.  Recent Labs     12/22/24  0625 12/23/24  0715 12/24/24  0635   HGB 8.5* 7.8* 7.7*   HCT 24.4* 23.1* 22.9*       Plan  - Monitor serial CBC: Daily  - Transfuse PRBC if patient becomes hemodynamically unstable, symptomatic or H/H drops below 7/21.  - Patient has received 1 units of PRBCs on 11/20, 11/21, 12/15  - Patient's anemia is currently stable  - Hb baseline 7-8. No obvious bleeding  - Eliquis held on admission.  DVT prophylaxis held.

## 2024-12-24 NOTE — PLAN OF CARE
12/24/24 1214   Post-Acute Status   Post-Acute Authorization Dialysis   Discharge Delays None known at this time     Per Griselda with dialysis;  [facility] can't start new pt's on Saturday because it's only ancillary staff. They have a revised schedule next week due to the holiday so he could start Monday and then Thursday, Saturday after that. Dr. Terrazas, was asked to dialyzing in hospital Thursday before discharge and then starting at the outpt dialysis unit on Monday. SW will continue to follow.      Indira Reagan, THERESE  - [facility]

## 2024-12-24 NOTE — ASSESSMENT & PLAN NOTE
Etoh Cirrhosis  Hx of HCC s/p Y90    MELD 3.0: 29 at 12/24/2024  6:35 AM  MELD-Na: 30 at 12/24/2024  6:35 AM  Calculated from:  Serum Creatinine: On dialysis. Using the maximum value.  Serum Sodium: 132 mmol/L at 12/24/2024  6:35 AM  Total Bilirubin: 2.8 mg/dL at 12/24/2024  6:35 AM  Serum Albumin: 2.1 g/dL at 12/24/2024  6:35 AM  INR(ratio): 1.5 at 12/23/2024  7:15 AM  Age at listing (hypothetical): 69 years  Sex: Male at 12/24/2024  6:35 AM    Cont lactulose    H/O HCC: treated with Y90 in June 2023.Last MRI abd w/o evidence of residual or recurrent disease 3/24/24. Due for repeat surveillance MRI as an outpatient.    Appreciate hepatology recommendations.   Current concerns regarding transplant include higher BMI, frailty / debility.       Discharge planning to SNF once dialysis set up with holiday schedule

## 2024-12-24 NOTE — ASSESSMENT & PLAN NOTE
The likely etiology of thrombocytopenia is liver disease. The patients 3 most recent labs are listed below.  Recent Labs     12/22/24  0625 12/23/24  0715 12/24/24  0635   PLT 76* 76* 86*       Plan  - Will transfuse if platelet count is <50k (if undergoing surgical procedure or have active bleeding).

## 2024-12-24 NOTE — PT/OT/SLP PROGRESS
Occupational Therapy      Patient Name:  Juan Carlos BROOKS Fredo Julian.   MRN:  [id number]    Patient not seen today secondary to patient was off due to HD . Will follow-up on the next schedule visit accordingly to OT POC.    12/24/2024

## 2024-12-24 NOTE — PROGRESS NOTES
Please see previous notes from this SW for continuity.    __________________________________________________  Pt accepted for outpt dialysis at [facility].    Please see below for pt's outpt dialysis information:    -[facility]  -[address]  [phone]  -Schedule: T/Th/S at 4:30PM  -Anticipated start date: **MONDAY** 12/30/2024 (revised schedule due to the holiday)  **Pt must arrive 30 minutes early to initial appt to sign consents**    Inpt treatment team updated via secure chat.    Griselda Romero LMSW  [facility]  [phone]

## 2024-12-24 NOTE — ASSESSMENT & PLAN NOTE
Hyponatremia is likely due to Cirrhosis. The patient's most recent sodium results are listed below.  Recent Labs     12/22/24  0625 12/23/24  0715 12/24/24  0635   * 129* 132*       Plan  - Correct the sodium by 4-6mEq in 24 hours.   - Appreciate nephrology recommendations  - Monitor sodium daily  - Patient hyponatremia is stable  - Mgmt with HD

## 2024-12-25 LAB
ALBUMIN SERPL BCP-MCNC: 2.1 G/DL (ref 3.5–5.2)
ALP SERPL-CCNC: 97 U/L (ref 40–150)
ALT SERPL W/O P-5'-P-CCNC: 19 U/L (ref 10–44)
ANION GAP SERPL CALC-SCNC: 8 MMOL/L (ref 8–16)
AST SERPL-CCNC: 37 U/L (ref 10–40)
BASOPHILS # BLD AUTO: 0 K/UL (ref 0–0.2)
BASOPHILS NFR BLD: 0 % (ref 0–1.9)
BILIRUB SERPL-MCNC: 3 MG/DL (ref 0.1–1)
BUN SERPL-MCNC: 31 MG/DL (ref 8–23)
CALCIUM SERPL-MCNC: 8.1 MG/DL (ref 8.7–10.5)
CHLORIDE SERPL-SCNC: 101 MMOL/L (ref 95–110)
CO2 SERPL-SCNC: 23 MMOL/L (ref 23–29)
CREAT SERPL-MCNC: 2.7 MG/DL (ref 0.5–1.4)
DIFFERENTIAL METHOD BLD: ABNORMAL
EOSINOPHIL # BLD AUTO: 0.3 K/UL (ref 0–0.5)
EOSINOPHIL NFR BLD: 4.1 % (ref 0–8)
ERYTHROCYTE [DISTWIDTH] IN BLOOD BY AUTOMATED COUNT: 27.5 % (ref 11.5–14.5)
EST. GFR  (NO RACE VARIABLE): 24.4 ML/MIN/1.73 M^2
GLUCOSE SERPL-MCNC: 102 MG/DL (ref 70–110)
HCT VFR BLD AUTO: 22.5 % (ref 40–54)
HGB BLD-MCNC: 7.6 G/DL (ref 14–18)
IMM GRANULOCYTES # BLD AUTO: 0.12 K/UL (ref 0–0.04)
IMM GRANULOCYTES NFR BLD AUTO: 1.6 % (ref 0–0.5)
LYMPHOCYTES # BLD AUTO: 0.7 K/UL (ref 1–4.8)
LYMPHOCYTES NFR BLD: 8.9 % (ref 18–48)
MCH RBC QN AUTO: 28 PG (ref 27–31)
MCHC RBC AUTO-ENTMCNC: 33.8 G/DL (ref 32–36)
MCV RBC AUTO: 83 FL (ref 82–98)
MONOCYTES # BLD AUTO: 1.8 K/UL (ref 0.3–1)
MONOCYTES NFR BLD: 24.1 % (ref 4–15)
NEUTROPHILS # BLD AUTO: 4.5 K/UL (ref 1.8–7.7)
NEUTROPHILS NFR BLD: 61.3 % (ref 38–73)
NRBC BLD-RTO: 0 /100 WBC
PHOSPHATE SERPL-MCNC: 2.7 MG/DL (ref 2.7–4.5)
PLATELET # BLD AUTO: 62 K/UL (ref 150–450)
PMV BLD AUTO: ABNORMAL FL (ref 9.2–12.9)
POTASSIUM SERPL-SCNC: 3.6 MMOL/L (ref 3.5–5.1)
PROT SERPL-MCNC: 5.6 G/DL (ref 6–8.4)
RBC # BLD AUTO: 2.71 M/UL (ref 4.6–6.2)
SODIUM SERPL-SCNC: 132 MMOL/L (ref 136–145)
WBC # BLD AUTO: 7.38 K/UL (ref 3.9–12.7)

## 2024-12-25 PROCEDURE — 25000003 PHARM REV CODE 250: Performed by: INTERNAL MEDICINE

## 2024-12-25 PROCEDURE — 63600175 PHARM REV CODE 636 W HCPCS: Performed by: HOSPITALIST

## 2024-12-25 PROCEDURE — 80053 COMPREHEN METABOLIC PANEL: CPT

## 2024-12-25 PROCEDURE — 84100 ASSAY OF PHOSPHORUS: CPT

## 2024-12-25 PROCEDURE — 25000003 PHARM REV CODE 250

## 2024-12-25 PROCEDURE — 85025 COMPLETE CBC W/AUTO DIFF WBC: CPT

## 2024-12-25 PROCEDURE — 36415 COLL VENOUS BLD VENIPUNCTURE: CPT

## 2024-12-25 PROCEDURE — 25000003 PHARM REV CODE 250: Performed by: STUDENT IN AN ORGANIZED HEALTH CARE EDUCATION/TRAINING PROGRAM

## 2024-12-25 PROCEDURE — 20600001 HC STEP DOWN PRIVATE ROOM

## 2024-12-25 RX ORDER — DIPHENHYDRAMINE HCL 25 MG
25 CAPSULE ORAL EVERY 6 HOURS PRN
Status: DISCONTINUED | OUTPATIENT
Start: 2024-12-25 | End: 2024-12-31 | Stop reason: HOSPADM

## 2024-12-25 RX ADMIN — MICONAZOLE NITRATE 2 % TOPICAL POWDER: at 08:12

## 2024-12-25 RX ADMIN — MUPIROCIN: 20 OINTMENT TOPICAL at 08:12

## 2024-12-25 RX ADMIN — MIDODRINE HYDROCHLORIDE 15 MG: 5 TABLET ORAL at 02:12

## 2024-12-25 RX ADMIN — MIDODRINE HYDROCHLORIDE 15 MG: 5 TABLET ORAL at 09:12

## 2024-12-25 RX ADMIN — HYDROCORTISONE 5 MG: 5 TABLET ORAL at 06:12

## 2024-12-25 RX ADMIN — DIPHENHYDRAMINE HYDROCHLORIDE 25 MG: 25 CAPSULE ORAL at 05:12

## 2024-12-25 RX ADMIN — LACTULOSE 30 G: 20 SOLUTION ORAL at 02:12

## 2024-12-25 RX ADMIN — MIDODRINE HYDROCHLORIDE 15 MG: 5 TABLET ORAL at 06:12

## 2024-12-25 RX ADMIN — MICONAZOLE NITRATE 2 % TOPICAL POWDER: at 09:12

## 2024-12-25 RX ADMIN — CEFTRIAXONE 1 G: 1 INJECTION, POWDER, FOR SOLUTION INTRAMUSCULAR; INTRAVENOUS at 03:12

## 2024-12-25 RX ADMIN — LACTULOSE 30 G: 20 SOLUTION ORAL at 08:12

## 2024-12-25 RX ADMIN — MUPIROCIN: 20 OINTMENT TOPICAL at 09:12

## 2024-12-25 RX ADMIN — METOPROLOL TARTRATE 6.25 MG: 25 TABLET, FILM COATED ORAL at 09:12

## 2024-12-25 NOTE — SUBJECTIVE & OBJECTIVE
Interval History:     NAEON. Cellulitis of abdomen improving. HD completed yesterday.     Review of Systems   Constitutional:  Negative for chills and fever.   HENT:  Negative for nosebleeds.    Respiratory:  Negative for cough.    Cardiovascular:  Positive for leg swelling.   Gastrointestinal:  Positive for abdominal distention. Negative for abdominal pain and diarrhea.   Genitourinary:  Positive for scrotal swelling.   Skin:  Positive for pallor and wound.   Neurological:  Negative for dizziness and light-headedness.   Psychiatric/Behavioral:  Negative for confusion.    All other systems reviewed and are negative.    Objective:     Vital Signs (Most Recent):  Temp: 97.8 °F (36.6 °C) (12/25/24 1119)  Pulse: 85 (12/25/24 1119)  Resp: 18 (12/25/24 1119)  BP: 99/60 (12/25/24 1119)  SpO2: 99 % (12/25/24 1119) Vital Signs (24h Range):  Temp:  [97.8 °F (36.6 °C)-98.2 °F (36.8 °C)] 97.8 °F (36.6 °C)  Pulse:  [72-94] 85  Resp:  [16-18] 18  SpO2:  [95 %-99 %] 99 %  BP: ()/(51-67) 99/60   Weight: (!) 142.9 kg (315 lb)  Body mass index is 41.56 kg/m².      Intake/Output Summary (Last 24 hours) at 12/25/2024 1446  Last data filed at 12/25/2024 0927  Gross per 24 hour   Intake 718 ml   Output 0 ml   Net 718 ml          Physical Exam  Vitals and nursing note reviewed.   Constitutional:       General: He is awake. He is not in acute distress.     Appearance: He is obese. He is ill-appearing.   HENT:      Head:      Comments: Contusion and small bump around right glabella.      Mouth/Throat:      Comments: Lower lip injury mostly healed  Eyes:      Extraocular Movements: Extraocular movements intact.      Conjunctiva/sclera: Conjunctivae normal.   Cardiovascular:      Rate and Rhythm: Normal rate.   Pulmonary:      Effort: Pulmonary effort is normal. No respiratory distress.   Abdominal:      General: There is distension.      Palpations: Abdomen is soft.      Tenderness: There is no abdominal tenderness.   Musculoskeletal:          General: No swelling or tenderness.      Right lower leg: Edema present.      Left lower leg: Edema present.      Comments: 2+ pitting edema in bilateral lower extremities   Skin:     General: Skin is warm.      Coloration: Skin is not jaundiced.      Findings: Bruising present.   Neurological:      Mental Status: He is alert and oriented to person, place, and time.      Motor: No weakness.   Psychiatric:         Behavior: Behavior normal.            Significant Labs:    CBC/Anemia Profile:  Recent Labs   Lab 12/24/24  0635 12/25/24  0624   WBC 7.31 7.38   HGB 7.7* 7.6*   HCT 22.9* 22.5*   PLT 86* 62*   MCV 82 83   RDW 27.4* 27.5*        Chemistries:  Recent Labs   Lab 12/24/24  0635 12/25/24  0624   * 132*   K 3.1* 3.6    101   CO2 20* 23   BUN 38* 31*   CREATININE 3.8* 2.7*   CALCIUM 8.6* 8.1*   ALBUMIN 2.1* 2.1*   PROT 5.5* 5.6*   BILITOT 2.8* 3.0*   ALKPHOS 99 97   ALT 23 19   AST 35 37   PHOS 3.0 2.7       All pertinent labs within the past 24 hours have been reviewed.    Significant Imaging:  I have reviewed all pertinent imaging results/findings within the past 24 hours.

## 2024-12-25 NOTE — ASSESSMENT & PLAN NOTE
Etoh Cirrhosis  Hx of HCC s/p Y90    MELD 3.0: 29 at 12/25/2024  6:24 AM  MELD-Na: 30 at 12/25/2024  6:24 AM  Calculated from:  Serum Creatinine: On dialysis. Using the maximum value.  Serum Sodium: 132 mmol/L at 12/25/2024  6:24 AM  Total Bilirubin: 3 mg/dL at 12/25/2024  6:24 AM  Serum Albumin: 2.1 g/dL at 12/25/2024  6:24 AM  INR(ratio): 1.5 at 12/23/2024  7:15 AM  Age at listing (hypothetical): 69 years  Sex: Male at 12/25/2024  6:24 AM    Cont lactulose    H/O HCC: treated with Y90 in June 2023.Last MRI abd w/o evidence of residual or recurrent disease 3/24/24. Due for repeat surveillance MRI as an outpatient.    Appreciate hepatology recommendations.   Current concerns regarding transplant include higher BMI, frailty / debility.       Discharge planning to SNF once dialysis set up with holiday schedule

## 2024-12-25 NOTE — ASSESSMENT & PLAN NOTE
Patient has persistent (7 days or more) atrial fibrillation. Patient is currently in atrial fibrillation. [id number] Score: 1. The patients heart rate in the last 24 hours is as follows:  Pulse  Min: 72  Max: 94       Plan  - Patient's afib is currently controlled  Holding home Eliquis in the setting of recent low blood counts, consider continue holding on DC .. Will discuss with patient regarding risks and benefits  Continue metoprolol 12.5 mg BID  - percy ttempt to change to liquid at 6.25 BID if possible and assess HR

## 2024-12-25 NOTE — ASSESSMENT & PLAN NOTE
Baseline creatinine is  1.5 . Most recent creatinine and eGFR are listed below.    Recent Labs     12/23/24  0715 12/24/24  0635 12/25/24  0624   CREATININE 3.5* 3.8* 2.7*   EGFRNORACEVR 17.9* 16.2* 24.4*        Plan  - JAE is worsening. Will continue current treatment  - Avoid nephrotoxins and renally dose meds for GFR listed above  - Monitor urine output, serial BMP, and adjust therapy as needed    - Etiology includes volume overload, obstruction, hypotension, possible HRS  Baseline creatinine is 1.5. May require dialysis long term.   Patient tolerated intermittent HD well (12/12)    IR placed RIJ TDC 12/16.     HD chair arranged. Delayed with holiday scheduled. HD on Friday  and then can go so he is able to get HD on Monday 12/30

## 2024-12-25 NOTE — ASSESSMENT & PLAN NOTE
Hyponatremia is likely due to Cirrhosis. The patient's most recent sodium results are listed below.  Recent Labs     12/23/24  0715 12/24/24  0635 12/25/24  0624   * 132* 132*       Plan  - Correct the sodium by 4-6mEq in 24 hours.   - Appreciate nephrology recommendations  - Monitor sodium daily  - Patient hyponatremia is stable  - Mgmt with HD

## 2024-12-25 NOTE — PLAN OF CARE
- Patient AAOx4. VSS on RA. Afebrile.   - Patient generalized bruising on skin and facial contusions from previous fall. Skin tears on bilateral arms covered with foam dressings and dated.  - Redness noted to B/L lower abdomen and flanks, outlined in marker. Rocephin and Miconazole powder continued. Benadryl ordered for itching on abdomen.  - Redness on buttocks noted- barrier cream applied.   - Voids spontaneously, scrotum +3 edema. Patient can ambulate to toilet.  - 1 moderate BM reported this shift- see I&O for details. Lactulose continued.  - 1 person standby-assist OOB with usage of walker.   - Bed locked and lowered, call bell in reach, nonskid socks on when OOB. Patient has no questions about his plan of care.  - Possible DC after HD on Friday (12/27)

## 2024-12-25 NOTE — PROGRESS NOTES
[facility] Medicine  Progress Note    Patient Name: Juan Carlos Yoo Sr.  MRN: [id number]  Patient Class: IP- Inpatient   Admission Date: 11/20/2024  Length of Stay: 35 days  Attending Physician: Glory Oliva MD  Primary Care Provider: Nyla Rios FNP        Subjective     Principal Problem:Decompensated cirrhosis        HPI:  69M with PMH of alcoholic cirrhosis, esophageal varices, Afib on eliquis, and HTN who presents for c/o worsening diffuse swelling as well as R arm pain/erythema. He was recently hospitalized 1 month ago at [facility] for volume overload in the setting of decompensated cirrhosis. He was treated with IV diuresis and discharged with adjustment to his diuretics, currently on lasix 60mg BID and metolazone 2.5mg every other week as per family. Patient reports diffuse swelling of his upper and lower extremities in addition to distended abdomen and worsening dyspnea, which he believes is due to recent medication adjustment. Family states he is non-compliant with low sodium diet and fluid restriction. Family states he has been compliant with diuretics, but rom't taken eliquis for 3 days due to issue getting refill. He also reports scratching right arm on door frame 3-4 days ago which has become red and painful after wrapping in in bandage. He claims to be compliant with medications, but is a poor historian. He follows with hepatology, not currently active on transplant list. He states he hasn't consumed alcohol for at least 9 months. Has c/o chills, but denies fever, cough, nausea, vomiting, chest pain, dysuria, hematuria, blood in stool. Workup in ED remarkable for VS: T 97.6 HR 94 RR 22 BP 95/56 O2 sat 97% on RA. Hb 6.7, MCV 75, Plt 81, PT/INR 22.6/2.2, Na 128, K 6.1, BUN/Cr 49/5.7, Alb 2.8, AST/ALT 35/9, Ammonia 104, , Trop neg, LA 2.9, CXR: Cardiac size is enlarged similar to prior.  No large volume of pleural fluid noted although there is mild blunting of the  right costophrenic angle which may relate to a small amount of pleural fluid.  Suspected right basilar opacity, to be correlated clinically for infection. RUE venous doppler: No thrombus in central veins of the right upper extremity. Patient received vanc/zosyn and lasix 80mg IVP in ED and will be admitted to hospital medicine service for further management.    Overview/Hospital Course:  69 y.o. male with history of EtOH use disorder, EtOH cirrhosis, HCC s/p y90, morbid obesity BMI 44, HTN, and Afib who presents with severe anasarca and JAE.      Appreciate hepatology and nephrology recommendations.  Diuretics were held because of concern for HRS.  Patient was started on albumin and midodrine per Nephrology recommendations for HRS.  Renal function continued to worsen and recommendation per Nephrology to transfer to ICU for maintaining goal map over 85 on Levophed.  ICU was notified and patient to be assessed to be transferred to ICU.     Patient also has Gram-positive cocci and Gram-negative rods in his blood now.  Possible sources could be got translocation and barrier related infection through skin as he has been significantly edematous and has been oozing.  Has been on vancomycin and Rocephin.  Id was consulted this morning.  Repeat blood cultures were obtained.     Continues to have anemia.  Hemoglobin dropped on 11/20 and was given 1 unit of packed red blood cells.  Did not respond appropriately.  Hemoglobin dropped again to 6.8 on 11/21 and was given 1 unit of packed red blood cells.  Hemoglobin again on 11/22 is 7.2.  No reported melena or hematochezia.  Patient was on Eliquis for atrial fibrillation prior to admission which was held.  Given history of esophageal varices and portal hypertensive gastropathy whereas also could be component of slow bleeding, under production due to CKD and hemolysis while also with decompensated cirrhosis.  Started on Protonix IV b.i.d. and octreotide.     Also clarified with  hepatology.  Given patient's current infection we will await ID recommendations however will be challenging to consider transplant evaluation at this time.  Current concerns regarding transplant include higher BMI, frailty / debility.      Goal clarification with the patient and family.  Patient at this time wants to be full code and continue with Levophed in ICU.  They would consider discussion with palliative care in a day or so if things do not get better.      In MICU patient started on Levophed, however titration remained difficult given tachycardic response from the patient.      11/24 Trialysis and arterial lines placed overnight and currently on levo and norepi. SLED today.     11/25 Boo dc. Increased midodrine. Continue steroids until d/c pressors. Discussed with Nephrology about our concern for sustained use of pressors to achieve their MAP goals of 85. Will follow up tomorrow.      11/26 Platelets 48. Repeat 38 confirming thrombocytopenia. Concern for HIT. D/c heparin. Increased lactulose dosing. Bladder scan revealed urine in bladder. Boo placed. Off vaso. Continuing levo. Maintain MAP goals 80. PT/OT consulted.      11/27 MAP goal 75-80. Heparin antibody result pending.      11/28 Pt with abdominal pain, decreased bowel movements, emesis. Lactic acid 2.9 and repeat 2.7. Less concerned for mesenteric ischemia and more of a concern was for SBO. NG tube placed with subsequent suction of 500mL fluid. Abdomen less distended after placement and pt subsequently had bowel movement. MAP Goal of 60 with plan to come off pressors entirely and switch to dialysis after permacath, as he is not  liver transplant eligible at this time.      11/29 Pt with ileus. Clear liquids for now. Platelets 24. HIT versus zosyn induced? Peripheral smear sent. Zosyn changed to kaylah. Consent and transfuse 1U. GOC conversation today. Pt opting for long term dialysis.      11/30 naeon. Started back on heparin. One time dose of 120mg  lasix today. Hold off SLED/SCUF today and give IV lasix 120 mg once; plan for SLED/SCUF tomorrow      12/1 Patient is becoming more dependent on dialysis. Not a current liver transplant candidate. Still complaining of abdominal pain after discontinuing the NGT. AXR with evidence of dilated bowel loops. Brown bomb administered. The days following administration of the brown bomb, patient had more frequent bowel movements and was able to pass flatulence. Constipation eventually resolved.   Given the need for pressor support, he was worked up for adrenal insufficiency which was positive. Consequently, he was started on steroids for adrenal insufficiency. His blood pressures improved, however, he still required pressor support, particularly during dialysis. Pressor support was eventually weaned off. Patient was started on midodrine to help with BP. Nephrology trialed intermittent HD and patient seemed to tolerate it well. Nephrology recommended placement of tunneled catheter for HD. Patient was medically stable for step down to the hospital medicine floors.     Patient underwent testing with cosyntropin stimulation test, results are not compatible with adrenal insufficiency, cont hydrocortisone taper. Patient tolerating HD. IR placed RIJ TDC 12/16. Discharge planning to SNF.    12/19- patient was walking to the bathroom and had a mechanical fall with a his foot stumbling and hit his face of the ledge in the bathroom.  Although he walked with a walker and had nursing staff to help however could not be controlled.  Small contusion of the right glabella and the nose bridge.  CT head without any acute fractures.  Also noting abdominal distention.  We will request a paracentesis.  CT abdomen without any concern for hematoma.    12/20- accepted for nursing facility however awaiting for hemodialysis chair.  Given this is a new  admission possible anticipated discharge on Monday per SNF  and requesting attempts to see if  systolics can improve > 110.  we will lower his Lopressor if possible.  We will hold his Flomax.     12/21- SNF admission on Monday anticipated.     12/22- Blood pressures have been borderline. HD likely tomorrow.     12/23- Cellulitis of R sided abdomen wall, edges marked. No systemic signs. Continued on rocephin. Chlorhexidine bath ordered.     12/24- Cellulitis appears better, continue rocephin, HD completed today.     12/25- HD yesterday, anticipating next session on Friday and so he can get his next HD on Monday. Abdominal cellulitis looks better.      Interval History:     NAEON. Cellulitis of abdomen improving. HD completed yesterday.     Review of Systems   Constitutional:  Negative for chills and fever.   HENT:  Negative for nosebleeds.    Respiratory:  Negative for cough.    Cardiovascular:  Positive for leg swelling.   Gastrointestinal:  Positive for abdominal distention. Negative for abdominal pain and diarrhea.   Genitourinary:  Positive for scrotal swelling.   Skin:  Positive for pallor and wound.   Neurological:  Negative for dizziness and light-headedness.   Psychiatric/Behavioral:  Negative for confusion.    All other systems reviewed and are negative.    Objective:     Vital Signs (Most Recent):  Temp: 97.8 °F (36.6 °C) (12/25/24 1119)  Pulse: 85 (12/25/24 1119)  Resp: 18 (12/25/24 1119)  BP: 99/60 (12/25/24 1119)  SpO2: 99 % (12/25/24 1119) Vital Signs (24h Range):  Temp:  [97.8 °F (36.6 °C)-98.2 °F (36.8 °C)] 97.8 °F (36.6 °C)  Pulse:  [72-94] 85  Resp:  [16-18] 18  SpO2:  [95 %-99 %] 99 %  BP: ()/(51-67) 99/60   Weight: (!) 142.9 kg (315 lb)  Body mass index is 41.56 kg/m².      Intake/Output Summary (Last 24 hours) at 12/25/2024 1446  Last data filed at 12/25/2024 0990  Gross per 24 hour   Intake 718 ml   Output 0 ml   Net 718 ml          Physical Exam  Vitals and nursing note reviewed.   Constitutional:       General: He is awake. He is not in acute distress.     Appearance: He is obese.  He is ill-appearing.   HENT:      Head:      Comments: Contusion and small bump around right glabella.      Mouth/Throat:      Comments: Lower lip injury mostly healed  Eyes:      Extraocular Movements: Extraocular movements intact.      Conjunctiva/sclera: Conjunctivae normal.   Cardiovascular:      Rate and Rhythm: Normal rate.   Pulmonary:      Effort: Pulmonary effort is normal. No respiratory distress.   Abdominal:      General: There is distension.      Palpations: Abdomen is soft.      Tenderness: There is no abdominal tenderness.   Musculoskeletal:         General: No swelling or tenderness.      Right lower leg: Edema present.      Left lower leg: Edema present.      Comments: 2+ pitting edema in bilateral lower extremities   Skin:     General: Skin is warm.      Coloration: Skin is not jaundiced.      Findings: Bruising present.   Neurological:      Mental Status: He is alert and oriented to person, place, and time.      Motor: No weakness.   Psychiatric:         Behavior: Behavior normal.            Significant Labs:    CBC/Anemia Profile:  Recent Labs   Lab 12/24/24  0635 12/25/24  0624   WBC 7.31 7.38   HGB 7.7* 7.6*   HCT 22.9* 22.5*   PLT 86* 62*   MCV 82 83   RDW 27.4* 27.5*        Chemistries:  Recent Labs   Lab 12/24/24  0635 12/25/24  0624   * 132*   K 3.1* 3.6    101   CO2 20* 23   BUN 38* 31*   CREATININE 3.8* 2.7*   CALCIUM 8.6* 8.1*   ALBUMIN 2.1* 2.1*   PROT 5.5* 5.6*   BILITOT 2.8* 3.0*   ALKPHOS 99 97   ALT 23 19   AST 35 37   PHOS 3.0 2.7       All pertinent labs within the past 24 hours have been reviewed.    Significant Imaging:  I have reviewed all pertinent imaging results/findings within the past 24 hours.    Assessment and Plan     * Decompensated cirrhosis  Etoh Cirrhosis  Hx of HCC s/p Y90    MELD 3.0: 29 at 12/25/2024  6:24 AM  MELD-Na: 30 at 12/25/2024  6:24 AM  Calculated from:  Serum Creatinine: On dialysis. Using the maximum value.  Serum Sodium: 132 mmol/L at  12/25/2024  6:24 AM  Total Bilirubin: 3 mg/dL at 12/25/2024  6:24 AM  Serum Albumin: 2.1 g/dL at 12/25/2024  6:24 AM  INR(ratio): 1.5 at 12/23/2024  7:15 AM  Age at listing (hypothetical): 69 years  Sex: Male at 12/25/2024  6:24 AM    Cont lactulose    H/O HCC: treated with Y90 in June 2023.Last MRI abd w/o evidence of residual or recurrent disease 3/24/24. Due for repeat surveillance MRI as an outpatient.    Appreciate hepatology recommendations.   Current concerns regarding transplant include higher BMI, frailty / debility.       Discharge planning to SNF once dialysis set up with holiday schedule        Morbid obesity  Body mass index is 40.08 kg/m². Morbid obesity complicates all aspects of disease management from diagnostic modalities to treatment. Weight loss encouraged and health benefits explained to patient.         Adrenal insufficiency  On going problem since admission, most likely multifactorial - component of liver dysfunction, HRS, sepsis on admission.  S/p pressors in ICU     Random cortisol was 6   Persistent hypotension thought to be adrenal insufficiency. Patient underwent testing with cosyntropin stimulation test, results are not compatible with adrenal insufficiency. appreciate endocrinology recommendations   Cont hydrocortisone taper, to complete 5mg on 12/27      Moderate protein-calorie malnutrition  Nutrition consulted. Most recent weight and BMI monitored-     Measurements:  Wt Readings from Last 1 Encounters:   12/13/24 (!) 137.8 kg (303 lb 12.7 oz)   Body mass index is 40.08 kg/m².    Patient has been screened and assessed by RD.    Malnutrition Type:  Context: acute illness or injury  Level: moderate    Malnutrition Characteristic Summary:  Energy Intake (Malnutrition): less than 75% for greater than 7 days  Subcutaneous Fat (Malnutrition): mild depletion  Muscle Mass (Malnutrition): mild depletion  Fluid Accumulation (Malnutrition): moderate to severe    Interventions/Recommendations  (treatment strategy):  1.)      Hypotension        Edema  Volume mgmt with RRT      Debility  Patient with Acute on chronic debility due to chronic unspecified fatigue, age-related physical debility, and other reduced mobility. The patient's latest AMPAC (Activity Measure for Post Acute Care) Score is listed below.    AM-PAC Score - How much help does the patient need for each activity listed  Basic Mobility Total Score: 17  Turning over in bed (including adjusting bedclothes, sheets and blankets)?: A little  Sitting down on and standing up from a chair with arms (e.g., wheelchair, bedside commode, etc.): A little  Moving from lying on back to sitting on the side of the bed?: A little  Moving to and from a bed to a chair (including a wheelchair)?: A little  Need to walk in hospital room?: A little  Climbing 3-5 steps with a railing?: A lot    Plan  - Progressive mobility protocol initated  - PT/OT consulted  - Fall precautions in place  - PT/OT recommending LI therapy. Planning for SNF      Advanced care planning/counseling discussion        Palliative care encounter  Goals of care, counseling/discussion  Advance Care Planning  Patient requests FULL CODE status and would like to continue current treatment      Abdominal pain        Gram-negative bacteremia  Blood cultures from admission with B. Diminuta, micrococcus luteus, lysinobacillus species. Seen by ID previously who recommended stopping antibiotics due to concern these were contaminants. Repeat blood cultures have been no growth. Has since been transferred to ICU with increased pressor requirement. Blood cultures repeated on 11/24 are no growth x 24 hours.   11/29 Switched from zosyn to meropenem due to concern of thrombocytopenia.   Finished meropenem course 12/8 @ 6 PM    Encephalopathy, metabolic  2/2 decompensated cirrhosis and JAE  Resolved    Hyponatremia  Hyponatremia is likely due to Cirrhosis. The patient's most recent sodium results are listed  below.  Recent Labs     12/23/24  0715 12/24/24  0635 12/25/24 0624   * 132* 132*       Plan  - Correct the sodium by 4-6mEq in 24 hours.   - Appreciate nephrology recommendations  - Monitor sodium daily  - Patient hyponatremia is stable  - Mgmt with HD    Thrombocytopenia  The likely etiology of thrombocytopenia is liver disease. The patients 3 most recent labs are listed below.  Recent Labs     12/23/24  0715 12/24/24  0635 12/25/24 0624   PLT 76* 86* 62*       Plan  - Will transfuse if platelet count is <50k (if undergoing surgical procedure or have active bleeding).      Microcytic anemia  Anemia is likely due to chronic disease due to Chronic liver disease. Most recent hemoglobin and hematocrit are listed below.  Recent Labs     12/23/24  0715 12/24/24 0635 12/25/24 0624   HGB 7.8* 7.7* 7.6*   HCT 23.1* 22.9* 22.5*       Plan  - Monitor serial CBC: Daily  - Transfuse PRBC if patient becomes hemodynamically unstable, symptomatic or H/H drops below 7/21.  - Patient has received 1 units of PRBCs on 11/20, 11/21, 12/15  - Patient's anemia is currently stable  - Hb baseline 7-8. No obvious bleeding  - Eliquis held on admission.  DVT prophylaxis held.    Stage 3a chronic kidney disease  ESRD on HD  Nephrology following  Will try to remove more fluid  Strict intake and output  Renally dose all medications  Avoid nephrotoxic agents  IR placed RIJ TDC 12/16  CM assisting with finding HD chair      JAE (acute kidney injury)  Baseline creatinine is  1.5 . Most recent creatinine and eGFR are listed below.    Recent Labs     12/23/24  0715 12/24/24  0635 12/25/24 0624   CREATININE 3.5* 3.8* 2.7*   EGFRNORACEVR 17.9* 16.2* 24.4*        Plan  - JAE is worsening. Will continue current treatment  - Avoid nephrotoxins and renally dose meds for GFR listed above  - Monitor urine output, serial BMP, and adjust therapy as needed    - Etiology includes volume overload, obstruction, hypotension, possible HRS  Baseline  creatinine is 1.5. May require dialysis long term.   Patient tolerated intermittent HD well (12/12)    IR placed RIJ TDC 12/16.     HD chair arranged. Delayed with holiday scheduled. HD on Friday  and then can go so he is able to get HD on Monday 12/30     Atrial fibrillation  Patient has persistent (7 days or more) atrial fibrillation. Patient is currently in atrial fibrillation. [id number] Score: 1. The patients heart rate in the last 24 hours is as follows:  Pulse  Min: 72  Max: 94       Plan  - Patient's afib is currently controlled  Holding home Eliquis in the setting of recent low blood counts, consider continue holding on DC .. Will discuss with patient regarding risks and benefits  Continue metoprolol 12.5 mg BID  - percy ttempt to change to liquid at 6.25 BID if possible and assess HR    Coagulopathy  2/2 cirrhosis. See plan below    Received 3 doses of IV vitamin K.  End Stage Liver Disease precipitated coagulopathy  Heparin d/c d/t thrombocytopenia          Goals of care, counseling/discussion  Advance Care Planning    Code Status  In light of the patients advanced and life limiting illness,I engaged the the patient and family in a voluntary conversation about the patient's preferences for care  at the very end of life. The patient wishes to have a natural, peaceful death.  Along those lines, the patient wishes to have CPR or other invasive treatments performed when his heart and/or breathing stops. I communicated to the patient and family. Continue FULL CODE.    A total of 15 min was spent on advance care planning, goals of care discussion, emotional support, formulating and communicating prognosis and exploring burden/benefit of various approaches of treatment. This discussion occurred on a fully voluntary basis with the verbal consent of the patient and/or family.           VTE Risk Mitigation (From admission, onward)           Ordered     heparin (porcine) injection 1,000 Units  As needed (PRN)          12/24/24 0945     heparin (porcine) injection 1,000 Units  Once         12/21/24 1812     Place sequential compression device  Until discontinued         11/22/24 1329     IP VTE HIGH RISK PATIENT  Once         11/20/24 1622                    Discharge Planning   AUTUMN: 12/26/2024     Code Status: Full Code   Medical Readiness for Discharge Date:   Discharge Plan A: Skilled Nursing Facility   Discharge Delays: None known at this time            Please place Justification for DME        Glory Oliva MD  Department of Hospital Medicine   [facility] Step[facility]

## 2024-12-25 NOTE — ASSESSMENT & PLAN NOTE
The likely etiology of thrombocytopenia is liver disease. The patients 3 most recent labs are listed below.  Recent Labs     12/23/24  0715 12/24/24  0635 12/25/24  0624   PLT 76* 86* 62*       Plan  - Will transfuse if platelet count is <50k (if undergoing surgical procedure or have active bleeding).

## 2024-12-25 NOTE — ASSESSMENT & PLAN NOTE
Anemia is likely due to chronic disease due to Chronic liver disease. Most recent hemoglobin and hematocrit are listed below.  Recent Labs     12/23/24  0715 12/24/24  0635 12/25/24  0624   HGB 7.8* 7.7* 7.6*   HCT 23.1* 22.9* 22.5*       Plan  - Monitor serial CBC: Daily  - Transfuse PRBC if patient becomes hemodynamically unstable, symptomatic or H/H drops below 7/21.  - Patient has received 1 units of PRBCs on 11/20, 11/21, 12/15  - Patient's anemia is currently stable  - Hb baseline 7-8. No obvious bleeding  - Eliquis held on admission.  DVT prophylaxis held.

## 2024-12-26 LAB
ALBUMIN SERPL BCP-MCNC: 2.2 G/DL (ref 3.5–5.2)
ALP SERPL-CCNC: 96 U/L (ref 40–150)
ALT SERPL W/O P-5'-P-CCNC: 19 U/L (ref 10–44)
ANION GAP SERPL CALC-SCNC: 8 MMOL/L (ref 8–16)
AST SERPL-CCNC: 34 U/L (ref 10–40)
BASOPHILS # BLD AUTO: 0.05 K/UL (ref 0–0.2)
BASOPHILS NFR BLD: 0.6 % (ref 0–1.9)
BILIRUB SERPL-MCNC: 2.9 MG/DL (ref 0.1–1)
BUN SERPL-MCNC: 37 MG/DL (ref 8–23)
CALCIUM SERPL-MCNC: 8.4 MG/DL (ref 8.7–10.5)
CHLORIDE SERPL-SCNC: 101 MMOL/L (ref 95–110)
CO2 SERPL-SCNC: 23 MMOL/L (ref 23–29)
CREAT SERPL-MCNC: 2.8 MG/DL (ref 0.5–1.4)
DIFFERENTIAL METHOD BLD: ABNORMAL
EOSINOPHIL # BLD AUTO: 0.3 K/UL (ref 0–0.5)
EOSINOPHIL NFR BLD: 4.1 % (ref 0–8)
ERYTHROCYTE [DISTWIDTH] IN BLOOD BY AUTOMATED COUNT: 27.4 % (ref 11.5–14.5)
EST. GFR  (NO RACE VARIABLE): 23.4 ML/MIN/1.73 M^2
GLUCOSE SERPL-MCNC: 100 MG/DL (ref 70–110)
HCT VFR BLD AUTO: 23.6 % (ref 40–54)
HGB BLD-MCNC: 7.9 G/DL (ref 14–18)
IMM GRANULOCYTES # BLD AUTO: 0.1 K/UL (ref 0–0.04)
IMM GRANULOCYTES NFR BLD AUTO: 1.3 % (ref 0–0.5)
LYMPHOCYTES # BLD AUTO: 0.6 K/UL (ref 1–4.8)
LYMPHOCYTES NFR BLD: 8.2 % (ref 18–48)
MAGNESIUM SERPL-MCNC: 1.9 MG/DL (ref 1.6–2.6)
MCH RBC QN AUTO: 27.9 PG (ref 27–31)
MCHC RBC AUTO-ENTMCNC: 33.5 G/DL (ref 32–36)
MCV RBC AUTO: 83 FL (ref 82–98)
MONOCYTES # BLD AUTO: 1.7 K/UL (ref 0.3–1)
MONOCYTES NFR BLD: 21.2 % (ref 4–15)
NEUTROPHILS # BLD AUTO: 5.1 K/UL (ref 1.8–7.7)
NEUTROPHILS NFR BLD: 64.6 % (ref 38–73)
NRBC BLD-RTO: 0 /100 WBC
PHOSPHATE SERPL-MCNC: 3.2 MG/DL (ref 2.7–4.5)
PLATELET # BLD AUTO: 78 K/UL (ref 150–450)
PMV BLD AUTO: ABNORMAL FL (ref 9.2–12.9)
POTASSIUM SERPL-SCNC: 3.3 MMOL/L (ref 3.5–5.1)
PROT SERPL-MCNC: 5.8 G/DL (ref 6–8.4)
RBC # BLD AUTO: 2.83 M/UL (ref 4.6–6.2)
SODIUM SERPL-SCNC: 132 MMOL/L (ref 136–145)
WBC # BLD AUTO: 7.84 K/UL (ref 3.9–12.7)

## 2024-12-26 PROCEDURE — 25000003 PHARM REV CODE 250: Performed by: STUDENT IN AN ORGANIZED HEALTH CARE EDUCATION/TRAINING PROGRAM

## 2024-12-26 PROCEDURE — 36415 COLL VENOUS BLD VENIPUNCTURE: CPT

## 2024-12-26 PROCEDURE — 97535 SELF CARE MNGMENT TRAINING: CPT | Mod: CO

## 2024-12-26 PROCEDURE — 36415 COLL VENOUS BLD VENIPUNCTURE: CPT | Performed by: INTERNAL MEDICINE

## 2024-12-26 PROCEDURE — 84100 ASSAY OF PHOSPHORUS: CPT

## 2024-12-26 PROCEDURE — 83735 ASSAY OF MAGNESIUM: CPT | Performed by: INTERNAL MEDICINE

## 2024-12-26 PROCEDURE — 97530 THERAPEUTIC ACTIVITIES: CPT | Mod: CO

## 2024-12-26 PROCEDURE — 80053 COMPREHEN METABOLIC PANEL: CPT

## 2024-12-26 PROCEDURE — 63600175 PHARM REV CODE 636 W HCPCS: Performed by: HOSPITALIST

## 2024-12-26 PROCEDURE — 85025 COMPLETE CBC W/AUTO DIFF WBC: CPT

## 2024-12-26 PROCEDURE — 20600001 HC STEP DOWN PRIVATE ROOM

## 2024-12-26 PROCEDURE — 25000003 PHARM REV CODE 250

## 2024-12-26 PROCEDURE — 25000003 PHARM REV CODE 250: Performed by: INTERNAL MEDICINE

## 2024-12-26 RX ADMIN — FAMOTIDINE 20 MG: 20 TABLET ORAL at 09:12

## 2024-12-26 RX ADMIN — LACTULOSE 30 G: 20 SOLUTION ORAL at 03:12

## 2024-12-26 RX ADMIN — HYDROCORTISONE 5 MG: 5 TABLET ORAL at 05:12

## 2024-12-26 RX ADMIN — MICONAZOLE NITRATE 2 % TOPICAL POWDER: at 09:12

## 2024-12-26 RX ADMIN — MIDODRINE HYDROCHLORIDE 15 MG: 5 TABLET ORAL at 01:12

## 2024-12-26 RX ADMIN — MUPIROCIN: 20 OINTMENT TOPICAL at 09:12

## 2024-12-26 RX ADMIN — METOPROLOL TARTRATE 6.25 MG: 25 TABLET, FILM COATED ORAL at 09:12

## 2024-12-26 RX ADMIN — LACTULOSE 30 G: 20 SOLUTION ORAL at 09:12

## 2024-12-26 RX ADMIN — CEFTRIAXONE 1 G: 1 INJECTION, POWDER, FOR SOLUTION INTRAMUSCULAR; INTRAVENOUS at 03:12

## 2024-12-26 RX ADMIN — MIDODRINE HYDROCHLORIDE 15 MG: 5 TABLET ORAL at 05:12

## 2024-12-26 RX ADMIN — MIDODRINE HYDROCHLORIDE 15 MG: 5 TABLET ORAL at 09:12

## 2024-12-26 RX ADMIN — DIPHENHYDRAMINE HYDROCHLORIDE 25 MG: 25 CAPSULE ORAL at 12:12

## 2024-12-26 NOTE — SUBJECTIVE & OBJECTIVE
Interval History: NAEON. Cellulitis of abdomen improving. Tolerating HD.     Review of Systems   Constitutional:  Negative for chills and fever.   HENT:  Negative for nosebleeds.    Respiratory:  Negative for cough.    Cardiovascular:  Positive for leg swelling.   Gastrointestinal:  Positive for abdominal distention. Negative for abdominal pain and diarrhea.   Genitourinary:  Positive for scrotal swelling.   Skin:  Positive for pallor and wound.   Neurological:  Negative for dizziness and light-headedness.   Psychiatric/Behavioral:  Negative for confusion.    All other systems reviewed and are negative.    Objective:     Vital Signs (Most Recent):  Temp: 97.6 °F (36.4 °C) (12/26/24 1112)  Pulse: 79 (12/26/24 1112)  Resp: 18 (12/26/24 1112)  BP: 109/66 (12/26/24 1112)  SpO2: 97 % (12/26/24 1112) Vital Signs (24h Range):  Temp:  [97.6 °F (36.4 °C)-97.8 °F (36.6 °C)] 97.6 °F (36.4 °C)  Pulse:  [79-89] 79  Resp:  [18] 18  SpO2:  [97 %-99 %] 97 %  BP: (101-128)/(52-67) 109/66   Weight: (!) 140.2 kg (309 lb)  Body mass index is 40.77 kg/m².      Intake/Output Summary (Last 24 hours) at 12/26/2024 1558  Last data filed at 12/26/2024 1413  Gross per 24 hour   Intake 1307 ml   Output 0 ml   Net 1307 ml          Physical Exam  Vitals and nursing note reviewed.   Constitutional:       General: He is awake. He is not in acute distress.     Appearance: He is obese. He is ill-appearing.   HENT:      Head:      Comments: Contusion and small bump around right glabella.      Mouth/Throat:      Comments: Lower lip injury mostly healed  Eyes:      Extraocular Movements: Extraocular movements intact.      Conjunctiva/sclera: Conjunctivae normal.   Cardiovascular:      Rate and Rhythm: Normal rate.   Pulmonary:      Effort: Pulmonary effort is normal. No respiratory distress.   Abdominal:      General: There is distension.      Palpations: Abdomen is soft.      Tenderness: There is no abdominal tenderness.   Musculoskeletal:          General: No swelling or tenderness.      Right lower leg: Edema present.      Left lower leg: Edema present.      Comments: 2+ pitting edema in bilateral lower extremities   Skin:     General: Skin is warm.      Coloration: Skin is not jaundiced.      Findings: Bruising present.   Neurological:      Mental Status: He is alert and oriented to person, place, and time.      Motor: No weakness.   Psychiatric:         Behavior: Behavior normal.            Significant Labs:    CBC/Anemia Profile:  Recent Labs   Lab 12/25/24 0624 12/26/24 0619   WBC 7.38 7.84   HGB 7.6* 7.9*   HCT 22.5* 23.6*   PLT 62* 78*   MCV 83 83   RDW 27.5* 27.4*        Chemistries:  Recent Labs   Lab 12/25/24 0624 12/26/24 0619 12/26/24  1122   * 132*  --    K 3.6 3.3*  --     101  --    CO2 23 23  --    BUN 31* 37*  --    CREATININE 2.7* 2.8*  --    CALCIUM 8.1* 8.4*  --    ALBUMIN 2.1* 2.2*  --    PROT 5.6* 5.8*  --    BILITOT 3.0* 2.9*  --    ALKPHOS 97 96  --    ALT 19 19  --    AST 37 34  --    MG  --   --  1.9   PHOS 2.7 3.2  --        All pertinent labs within the past 24 hours have been reviewed.    Significant Imaging:  I have reviewed all pertinent imaging results/findings within the past 24 hours.

## 2024-12-26 NOTE — PROGRESS NOTES
[facility] - Transplant Stepdown  Adult Nutrition  Progress Note    SUMMARY       Recommendations  Continue Renal, low sodium diet- Fluid per MD  2.  Continue Novasource Renal TID for optimization of calorie intake  3. RD following     Goals: to meet % of EEN/EPN by next RD f/u  Nutrition Goal Status: progressing  Communication of RD Recs:  (POC)     Assessment and Plan    Endocrine  Moderate protein-calorie malnutrition  Malnutrition Type:  Context: acute illness or injury  Level: moderate    Related to (etiology):   Inadequate protein- calorie intake    Signs and Symptoms (as evidenced by):   Mild muscle- fat wasting, moderate to severe edema present, and poor PO intake     Malnutrition Characteristic Summary:  Energy Intake (Malnutrition): less than 75% for greater than 7 days  Subcutaneous Fat (Malnutrition): mild depletion  Muscle Mass (Malnutrition): mild depletion  Fluid Accumulation (Malnutrition): moderate to severe    Interventions (treatment strategy):  Collaboration of nutritional care with other providers.   ONS    Nutrition Diagnosis Status:   Resolving       Malnutrition Assessment  Malnutrition Context: acute illness or injury  Malnutrition Level: moderate          Energy Intake (Malnutrition): less than 75% for greater than 7 days  Subcutaneous Fat (Malnutrition): mild depletion  Muscle Mass (Malnutrition): mild depletion  Fluid Accumulation (Malnutrition): moderate to severe   Orbital Region (Subcutaneous Fat Loss): mild depletion  Upper Arm Region (Subcutaneous Fat Loss): mild depletion   Muslim Region (Muscle Loss): mild depletion  Clavicle Bone Region (Muscle Loss): mild depletion  Clavicle and Acromion Bone Region (Muscle Loss): mild depletion  Scapular Bone Region (Muscle Loss): mild depletion  Dorsal Hand (Muscle Loss): well nourished  Patellar Region (Muscle Loss):  (too much fluid)  Anterior Thigh Region (Muscle Loss):  (too much fluid)  Posterior Calf Region (Muscle Loss):  (too much  "fluid)       Reason for Assessment    Reason For Assessment: RD follow-up  Diagnosis: liver disease (Decompensated cirrhosis)  General Information Comments: RD follow-up: Spoke w/ pt at bedside, pt consuming 100% of meals provided along w/ supplements. RD following. Pt continues w/ malnutrition- however it is resolving. RD following.   Nutrition Discharge Planning: Regular Diet, fluid per MD  Nutrition Related Social Determinants of Health: SDOH: None Identified     Nutrition Risk Screen    Nutrition Risk Screen: no indicators present    Nutrition/Diet History    Patient Reported Diet/Restrictions/Preferences: general  Typical Food/Fluid Intake: 2 meals/day + snacks  Spiritual, Cultural Beliefs, Yazdanism Practices, Values that Affect Care: no  Food Allergies: NKFA    Anthropometrics    Temp: 97.6 °F (36.4 °C)  Height Method: Stated  Height: 6' 1" (185.4 cm)  Height (inches): 73 in  Weight Method: Bed Scale  Weight: (!) 140.2 kg (309 lb)  Weight (lb): (!) 309 lb  Ideal Body Weight (IBW), Male: 184 lb  % Ideal Body Weight, Male (lb): 183.32 %  BMI (Calculated): 41.6       Lab/Procedures/Meds    Pertinent Labs Reviewed: reviewed  Pertinent Labs Comments: Na: 133, K: 3.3, Cr: 2.7, GFR: 24.4, gluc: 114,  PRO: 5.2, tbili: 3.7  Pertinent Medications Reviewed: reviewed  Pertinent Medications Comments: Famotidine, lactulose, K Bicarb    Estimated/Assessed Needs    Weight Used For Calorie Calculations: (!) 141.1 kg (311 lb 1.1 oz)  Energy Calorie Requirements (kcal): 2220 kcal  Energy Need Method: [facility]  Protein Requirements: 167- 184 (2.0-2.2g/kg of IBW)  Weight Used For Protein Calculations: 83.5 kg (184 lb) (IBW d/t BMI >40.0)  Fluid Requirements (mL): as per MD  RDA Method (mL): 2220  Nutrition Prescription Ordered    Current Diet Order: Renal  Oral Nutrition Supplement: Novasource Renal    Evaluation of Received Nutrient/Fluid Intake    I/O: -6.4 L since admit  Energy Calories Required: meeting needs  Protein " Required: meeting needs  Fluid Required:  (1 ml or fluid as per MD)  % Intake of Estimated Energy Needs: 75 - 100 %  % Meal Intake: %    Nutrition Risk    Level of Risk/Frequency of Follow-up: low - moderate     Monitor and Evaluation    Food and Nutrient Intake: food and beverage intake  Food and Nutrient Adminstration: diet order  Knowledge/Beliefs/Attitudes: food and nutrition knowledge/skill, beliefs and attitudes  Physical Activity and Function: nutrition-related ADLs and IADLs  Anthropometric Measurements: weight, weight change, body mass index  Biochemical Data, Medical Tests and Procedures: electrolyte and renal panel, gastrointestinal profile, glucose/endocrine profile, lipid profile, inflammatory profile  Nutrition-Focused Physical Findings: overall appearance, skin     Nutrition Follow-Up    RD Follow-up?: Yes

## 2024-12-26 NOTE — PLAN OF CARE
- Patient AAOx4. VSS on RA. Afebrile.   - Patient generalized bruising on skin and facial contusions from previous fall. Skin tears on bilateral arms covered with foam dressings and dated.  - US guided placed new RFA 20g. Old PIV removed.  - Redness noted to B/L lower abdomen and flanks, outlined in marker. Rocephin and Miconazole powder continued. Benadryl ordered for itching on abdomen. Patient states it works very well.  - Redness on buttocks noted- barrier cream applied.   - Voids spontaneously, scrotum +3 edema. Patient can ambulate to toilet.  - 1 moderate BM reported this shift- see I&O for details. Lactulose continued.  - 1 person standby-assist OOB with usage of walker.   - PT/OT work with patient today. Patient walked the halls with walker.  - Patient remains up in chair for duration of shift.  - Bed locked and lowered, call bell in reach, nonskid socks on when OOB. Patient has no questions about his plan of care.  - Possible DC after HD on Friday (12/27)

## 2024-12-26 NOTE — ASSESSMENT & PLAN NOTE
Patient has persistent (7 days or more) atrial fibrillation. Patient is currently in atrial fibrillation. [id number] Score: 1. The patients heart rate in the last 24 hours is as follows:  Pulse  Min: 79  Max: 89       Plan  - Patient's afib is currently controlled  Holding home Eliquis in the setting of recent low blood counts, consider continue holding on DC. Will discuss with patient regarding risks and benefits  Continue metoprolol 6.25 BID

## 2024-12-26 NOTE — ASSESSMENT & PLAN NOTE
Hyponatremia is likely due to Cirrhosis. The patient's most recent sodium results are listed below.  Recent Labs     12/24/24  0635 12/25/24  0624 12/26/24  0619   * 132* 132*       Plan  - Correct the sodium by 4-6mEq in 24 hours.   - Appreciate nephrology recommendations  - Monitor sodium daily  - Patient hyponatremia is stable  - Mgmt with HD

## 2024-12-26 NOTE — ASSESSMENT & PLAN NOTE
Baseline creatinine is  1.5 . Most recent creatinine and eGFR are listed below.    Recent Labs     12/24/24  0635 12/25/24  0624 12/26/24  0619   CREATININE 3.8* 2.7* 2.8*   EGFRNORACEVR 16.2* 24.4* 23.4*        Plan  - JAE is worsening. Will continue current treatment  - Avoid nephrotoxins and renally dose meds for GFR listed above  - Monitor urine output, serial BMP, and adjust therapy as needed    - Etiology includes volume overload, obstruction, hypotension, possible HRS  Baseline creatinine is 1.5. May require dialysis long term.   Patient tolerated intermittent HD well (12/12)    IR placed RIJ TDC 12/16.     HD chair arranged. Delayed with holiday scheduled. HD on Friday  and then can go so he is able to get HD on Monday 12/30

## 2024-12-26 NOTE — ASSESSMENT & PLAN NOTE
Blood cultures from admission with B. Diminuta, micrococcus luteus, lysinobacillus species. Seen by ID previously who recommended stopping antibiotics due to concern these were contaminants. Repeat blood cultures have been no growth. Has since been transferred to ICU with increased pressor requirement. Blood cultures repeated on 11/24 are no growth x 24 hours.   11/29 Switched from zosyn to meropenem due to concern of thrombocytopenia.   Finished meropenem course 12/8

## 2024-12-26 NOTE — ASSESSMENT & PLAN NOTE
The likely etiology of thrombocytopenia is liver disease. The patients 3 most recent labs are listed below.  Recent Labs     12/24/24  0635 12/25/24  0624 12/26/24  0619   PLT 86* 62* 78*       Plan  - Will transfuse if platelet count is <50k (if undergoing surgical procedure or have active bleeding).

## 2024-12-26 NOTE — PROGRESS NOTES
[facility] - Transplant [facility] Medicine  Progress Note    Patient Name: Juan Carlos Yoo Sr.  MRN: [id number]  Patient Class: IP- Inpatient   Admission Date: 11/20/2024  Length of Stay: 36 days  Attending Physician: Giovani Wheat MD  Primary Care Provider: Nyla Rios FNP        Subjective     Principal Problem:Decompensated cirrhosis        HPI:  69M with PMH of alcoholic cirrhosis, esophageal varices, Afib on eliquis, and HTN who presents for c/o worsening diffuse swelling as well as R arm pain/erythema. He was recently hospitalized 1 month ago at [facility] for volume overload in the setting of decompensated cirrhosis. He was treated with IV diuresis and discharged with adjustment to his diuretics, currently on lasix 60mg BID and metolazone 2.5mg every other week as per family. Patient reports diffuse swelling of his upper and lower extremities in addition to distended abdomen and worsening dyspnea, which he believes is due to recent medication adjustment. Family states he is non-compliant with low sodium diet and fluid restriction. Family states he has been compliant with diuretics, but rom't taken eliquis for 3 days due to issue getting refill. He also reports scratching right arm on door frame 3-4 days ago which has become red and painful after wrapping in in bandage. He claims to be compliant with medications, but is a poor historian. He follows with hepatology, not currently active on transplant list. He states he hasn't consumed alcohol for at least 9 months. Has c/o chills, but denies fever, cough, nausea, vomiting, chest pain, dysuria, hematuria, blood in stool. Workup in ED remarkable for VS: T 97.6 HR 94 RR 22 BP 95/56 O2 sat 97% on RA. Hb 6.7, MCV 75, Plt 81, PT/INR 22.6/2.2, Na 128, K 6.1, BUN/Cr 49/5.7, Alb 2.8, AST/ALT 35/9, Ammonia 104, , Trop neg, LA 2.9, CXR: Cardiac size is enlarged similar to prior.  No large volume of pleural fluid noted although there is mild blunting of  the right costophrenic angle which may relate to a small amount of pleural fluid.  Suspected right basilar opacity, to be correlated clinically for infection. RUE venous doppler: No thrombus in central veins of the right upper extremity. Patient received vanc/zosyn and lasix 80mg IVP in ED and will be admitted to hospital medicine service for further management.    Overview/Hospital Course:  69 y.o. male with history of EtOH use disorder, EtOH cirrhosis, HCC s/p y90, morbid obesity BMI 44, HTN, and Afib who presents with severe anasarca and JAE.      Appreciate hepatology and nephrology recommendations.  Diuretics were held because of concern for HRS.  Patient was started on albumin and midodrine per Nephrology recommendations for HRS.  Renal function continued to worsen and recommendation per Nephrology to transfer to ICU for maintaining goal map over 85 on Levophed.  ICU was notified and patient to be assessed to be transferred to ICU.     Patient also has Gram-positive cocci and Gram-negative rods in his blood now.  Possible sources could be got translocation and barrier related infection through skin as he has been significantly edematous and has been oozing.  Has been on vancomycin and Rocephin.  Id was consulted this morning.  Repeat blood cultures were obtained.     Continues to have anemia.  Hemoglobin dropped on 11/20 and was given 1 unit of packed red blood cells.  Did not respond appropriately.  Hemoglobin dropped again to 6.8 on 11/21 and was given 1 unit of packed red blood cells.  Hemoglobin again on 11/22 is 7.2.  No reported melena or hematochezia.  Patient was on Eliquis for atrial fibrillation prior to admission which was held.  Given history of esophageal varices and portal hypertensive gastropathy whereas also could be component of slow bleeding, under production due to CKD and hemolysis while also with decompensated cirrhosis.  Started on Protonix IV b.i.d. and octreotide.     Also clarified  with hepatology.  Given patient's current infection we will await ID recommendations however will be challenging to consider transplant evaluation at this time.  Current concerns regarding transplant include higher BMI, frailty / debility.      Goal clarification with the patient and family.  Patient at this time wants to be full code and continue with Levophed in ICU.  They would consider discussion with palliative care in a day or so if things do not get better.      In MICU patient started on Levophed, however titration remained difficult given tachycardic response from the patient.      11/24 Trialysis and arterial lines placed overnight and currently on levo and norepi. SLED today.     11/25 Boo dc. Increased midodrine. Continue steroids until d/c pressors. Discussed with Nephrology about our concern for sustained use of pressors to achieve their MAP goals of 85. Will follow up tomorrow.      11/26 Platelets 48. Repeat 38 confirming thrombocytopenia. Concern for HIT. D/c heparin. Increased lactulose dosing. Bladder scan revealed urine in bladder. Boo placed. Off vaso. Continuing levo. Maintain MAP goals 80. PT/OT consulted.      11/27 MAP goal 75-80. Heparin antibody result pending.      11/28 Pt with abdominal pain, decreased bowel movements, emesis. Lactic acid 2.9 and repeat 2.7. Less concerned for mesenteric ischemia and more of a concern was for SBO. NG tube placed with subsequent suction of 500mL fluid. Abdomen less distended after placement and pt subsequently had bowel movement. MAP Goal of 60 with plan to come off pressors entirely and switch to dialysis after permacath, as he is not  liver transplant eligible at this time.      11/29 Pt with ileus. Clear liquids for now. Platelets 24. HIT versus zosyn induced? Peripheral smear sent. Zosyn changed to kaylah. Consent and transfuse 1U. GOC conversation today. Pt opting for long term dialysis.      11/30 naeon. Started back on heparin. One time dose of  120mg lasix today. Hold off SLED/SCUF today and give IV lasix 120 mg once; plan for SLED/SCUF tomorrow      12/1 Patient is becoming more dependent on dialysis. Not a current liver transplant candidate. Still complaining of abdominal pain after discontinuing the NGT. AXR with evidence of dilated bowel loops. Brown bomb administered. The days following administration of the brown bomb, patient had more frequent bowel movements and was able to pass flatulence. Constipation eventually resolved.   Given the need for pressor support, he was worked up for adrenal insufficiency which was positive. Consequently, he was started on steroids for adrenal insufficiency. His blood pressures improved, however, he still required pressor support, particularly during dialysis. Pressor support was eventually weaned off. Patient was started on midodrine to help with BP. Nephrology trialed intermittent HD and patient seemed to tolerate it well. Nephrology recommended placement of tunneled catheter for HD. Patient was medically stable for step down to the hospital medicine floors.     Patient underwent testing with cosyntropin stimulation test, results are not compatible with adrenal insufficiency, cont hydrocortisone taper. Patient tolerating HD. IR placed RIJ TDC 12/16. Discharge planning to SNF.    12/19- patient was walking to the bathroom and had a mechanical fall with a his foot stumbling and hit his face of the ledge in the bathroom.  Although he walked with a walker and had nursing staff to help however could not be controlled.  Small contusion of the right glabella and the nose bridge.  CT head without any acute fractures.  Also noting abdominal distention.  We will request a paracentesis.  CT abdomen without any concern for hematoma.    12/20- accepted for nursing facility however awaiting for hemodialysis chair.  Given this is a new  admission possible anticipated discharge on Monday per SNF  and requesting attempts to see if  systolics can improve > 110.  we will lower his Lopressor if possible.  We will hold his Flomax.     12/21- SNF admission on Monday anticipated.     12/22- Blood pressures have been borderline. HD likely tomorrow.     12/23- Cellulitis of R sided abdomen wall, edges marked. No systemic signs. Continued on rocephin. Chlorhexidine bath ordered.     12/24- Cellulitis appears better, continue rocephin, HD completed today.     12/25- HD yesterday, anticipating next session on Friday and so he can get his next HD on Monday. Abdominal cellulitis looks better.     Interval History: NAEON. Cellulitis of abdomen improving. Tolerating HD.     Review of Systems   Constitutional:  Negative for chills and fever.   HENT:  Negative for nosebleeds.    Respiratory:  Negative for cough.    Cardiovascular:  Positive for leg swelling.   Gastrointestinal:  Positive for abdominal distention. Negative for abdominal pain and diarrhea.   Genitourinary:  Positive for scrotal swelling.   Skin:  Positive for pallor and wound.   Neurological:  Negative for dizziness and light-headedness.   Psychiatric/Behavioral:  Negative for confusion.    All other systems reviewed and are negative.    Objective:     Vital Signs (Most Recent):  Temp: 97.6 °F (36.4 °C) (12/26/24 1112)  Pulse: 79 (12/26/24 1112)  Resp: 18 (12/26/24 1112)  BP: 109/66 (12/26/24 1112)  SpO2: 97 % (12/26/24 1112) Vital Signs (24h Range):  Temp:  [97.6 °F (36.4 °C)-97.8 °F (36.6 °C)] 97.6 °F (36.4 °C)  Pulse:  [79-89] 79  Resp:  [18] 18  SpO2:  [97 %-99 %] 97 %  BP: (101-128)/(52-67) 109/66   Weight: (!) 140.2 kg (309 lb)  Body mass index is 40.77 kg/m².      Intake/Output Summary (Last 24 hours) at 12/26/2024 1558  Last data filed at 12/26/2024 1413  Gross per 24 hour   Intake 1307 ml   Output 0 ml   Net 1307 ml          Physical Exam  Vitals and nursing note reviewed.   Constitutional:       General: He is awake. He is not in acute distress.     Appearance: He is obese. He is  ill-appearing.   HENT:      Head:      Comments: Contusion and small bump around right glabella.      Mouth/Throat:      Comments: Lower lip injury mostly healed  Eyes:      Extraocular Movements: Extraocular movements intact.      Conjunctiva/sclera: Conjunctivae normal.   Cardiovascular:      Rate and Rhythm: Normal rate.   Pulmonary:      Effort: Pulmonary effort is normal. No respiratory distress.   Abdominal:      General: There is distension.      Palpations: Abdomen is soft.      Tenderness: There is no abdominal tenderness.   Musculoskeletal:         General: No swelling or tenderness.      Right lower leg: Edema present.      Left lower leg: Edema present.      Comments: 2+ pitting edema in bilateral lower extremities   Skin:     General: Skin is warm.      Coloration: Skin is not jaundiced.      Findings: Bruising present.   Neurological:      Mental Status: He is alert and oriented to person, place, and time.      Motor: No weakness.   Psychiatric:         Behavior: Behavior normal.            Significant Labs:    CBC/Anemia Profile:  Recent Labs   Lab 12/25/24  0624 12/26/24  0619   WBC 7.38 7.84   HGB 7.6* 7.9*   HCT 22.5* 23.6*   PLT 62* 78*   MCV 83 83   RDW 27.5* 27.4*        Chemistries:  Recent Labs   Lab 12/25/24  0624 12/26/24  0619 12/26/24  1122   * 132*  --    K 3.6 3.3*  --     101  --    CO2 23 23  --    BUN 31* 37*  --    CREATININE 2.7* 2.8*  --    CALCIUM 8.1* 8.4*  --    ALBUMIN 2.1* 2.2*  --    PROT 5.6* 5.8*  --    BILITOT 3.0* 2.9*  --    ALKPHOS 97 96  --    ALT 19 19  --    AST 37 34  --    MG  --   --  1.9   PHOS 2.7 3.2  --        All pertinent labs within the past 24 hours have been reviewed.    Significant Imaging:  I have reviewed all pertinent imaging results/findings within the past 24 hours.    Assessment and Plan     * Decompensated cirrhosis  Etoh Cirrhosis  Hx of HCC s/p Y90    MELD 3.0: 29 at 12/25/2024  6:24 AM  MELD-Na: 30 at 12/25/2024  6:24 AM  Calculated  from:  Serum Creatinine: On dialysis. Using the maximum value.  Serum Sodium: 132 mmol/L at 12/25/2024  6:24 AM  Total Bilirubin: 3 mg/dL at 12/25/2024  6:24 AM  Serum Albumin: 2.1 g/dL at 12/25/2024  6:24 AM  INR(ratio): 1.5 at 12/23/2024  7:15 AM  Age at listing (hypothetical): 69 years  Sex: Male at 12/25/2024  6:24 AM    Cont lactulose    H/O HCC: treated with Y90 in June 2023.Last MRI abd w/o evidence of residual or recurrent disease 3/24/24. Due for repeat surveillance MRI as an outpatient.    Appreciate hepatology recommendations.   Current concerns regarding transplant include higher BMI, frailty / debility.       Discharge planning to SNF once dialysis set up        Morbid obesity  Body mass index is 40.77 kg/m². Morbid obesity complicates all aspects of disease management from diagnostic modalities to treatment. Weight loss encouraged and health benefits explained to patient.         Adrenal insufficiency  On going problem since admission, most likely multifactorial - component of liver dysfunction, HRS, sepsis on admission.  S/p pressors in ICU     Random cortisol was 6   Persistent hypotension thought to be adrenal insufficiency. Patient underwent testing with cosyntropin stimulation test, results are not compatible with adrenal insufficiency. appreciate endocrinology recommendations   Cont hydrocortisone taper, to complete 5mg on 12/27      Moderate protein-calorie malnutrition  Nutrition consulted. Most recent weight and BMI monitored-     Measurements:  Wt Readings from Last 1 Encounters:   12/26/24 (!) 140.2 kg (309 lb)   Body mass index is 40.77 kg/m².    Patient has been screened and assessed by RD.    Malnutrition Type:  Context: acute illness or injury  Level: moderate    Malnutrition Characteristic Summary:  Energy Intake (Malnutrition): less than 75% for greater than 7 days  Subcutaneous Fat (Malnutrition): mild depletion  Muscle Mass (Malnutrition): mild depletion  Fluid Accumulation  (Malnutrition): moderate to severe    Interventions/Recommendations (treatment strategy):  1.)      Hypotension        Edema  Volume mgmt with RRT      Debility  Patient with Acute on chronic debility due to chronic unspecified fatigue, age-related physical debility, and other reduced mobility. The patient's latest AMPAC (Activity Measure for Post Acute Care) Score is listed below.    AM-PAC Score - How much help does the patient need for each activity listed  Basic Mobility Total Score: 17  Turning over in bed (including adjusting bedclothes, sheets and blankets)?: A little  Sitting down on and standing up from a chair with arms (e.g., wheelchair, bedside commode, etc.): A little  Moving from lying on back to sitting on the side of the bed?: A little  Moving to and from a bed to a chair (including a wheelchair)?: A little  Need to walk in hospital room?: A little  Climbing 3-5 steps with a railing?: A lot    Plan  - Progressive mobility protocol initated  - PT/OT consulted  - Fall precautions in place  - PT/OT recommending LI therapy. Planning for SNF      Advanced care planning/counseling discussion        Palliative care encounter  Goals of care, counseling/discussion  Advance Care Planning  Patient requests FULL CODE status and would like to continue current treatment      Abdominal pain        Gram-negative bacteremia  Blood cultures from admission with B. Diminuta, micrococcus luteus, lysinobacillus species. Seen by ID previously who recommended stopping antibiotics due to concern these were contaminants. Repeat blood cultures have been no growth. Has since been transferred to ICU with increased pressor requirement. Blood cultures repeated on 11/24 are no growth x 24 hours.   11/29 Switched from zosyn to meropenem due to concern of thrombocytopenia.   Finished meropenem course 12/8     Encephalopathy, metabolic  2/2 decompensated cirrhosis and JAE  Resolved    Hyponatremia  Hyponatremia is likely due to  Cirrhosis. The patient's most recent sodium results are listed below.  Recent Labs     12/24/24  0635 12/25/24  0624 12/26/24  0619   * 132* 132*       Plan  - Correct the sodium by 4-6mEq in 24 hours.   - Appreciate nephrology recommendations  - Monitor sodium daily  - Patient hyponatremia is stable  - Mgmt with HD    Thrombocytopenia  The likely etiology of thrombocytopenia is liver disease. The patients 3 most recent labs are listed below.  Recent Labs     12/24/24  0635 12/25/24  0624 12/26/24  0619   PLT 86* 62* 78*       Plan  - Will transfuse if platelet count is <50k (if undergoing surgical procedure or have active bleeding).      Microcytic anemia  Anemia is likely due to chronic disease due to Chronic liver disease. Most recent hemoglobin and hematocrit are listed below.  Recent Labs     12/24/24  0635 12/25/24  0624 12/26/24  0619   HGB 7.7* 7.6* 7.9*   HCT 22.9* 22.5* 23.6*       Plan  - Monitor serial CBC: Daily  - Transfuse PRBC if patient becomes hemodynamically unstable, symptomatic or H/H drops below 7/21.  - Patient has received 1 units of PRBCs on 11/20, 11/21, 12/15  - Patient's anemia is currently stable  - Hb baseline 7-8. No obvious bleeding  - Eliquis held on admission.  DVT prophylaxis held.    Stage 3a chronic kidney disease  ESRD on HD  Nephrology following  Will try to remove more fluid  Strict intake and output  Renally dose all medications  Avoid nephrotoxic agents  IR placed RIJ TDC 12/16  CM assisting with finding HD chair      JAE (acute kidney injury)  Baseline creatinine is  1.5 . Most recent creatinine and eGFR are listed below.    Recent Labs     12/24/24  0635 12/25/24  0624 12/26/24  0619   CREATININE 3.8* 2.7* 2.8*   EGFRNORACEVR 16.2* 24.4* 23.4*        Plan  - JAE is worsening. Will continue current treatment  - Avoid nephrotoxins and renally dose meds for GFR listed above  - Monitor urine output, serial BMP, and adjust therapy as needed    - Etiology includes volume  overload, obstruction, hypotension, possible HRS  Baseline creatinine is 1.5. May require dialysis long term.   Patient tolerated intermittent HD well (12/12)    IR placed RIJ TDC 12/16.     HD chair arranged. Delayed with holiday scheduled. HD on Friday  and then can go so he is able to get HD on Monday 12/30     Atrial fibrillation  Patient has persistent (7 days or more) atrial fibrillation. Patient is currently in atrial fibrillation. [id number] Score: 1. The patients heart rate in the last 24 hours is as follows:  Pulse  Min: 79  Max: 89       Plan  - Patient's afib is currently controlled  Holding home Eliquis in the setting of recent low blood counts, consider continue holding on DC. Will discuss with patient regarding risks and benefits  Continue metoprolol 6.25 BID     Coagulopathy  2/2 cirrhosis. See plan below    Received 3 doses of IV vitamin K.  End Stage Liver Disease precipitated coagulopathy  Heparin d/c d/t thrombocytopenia          Goals of care, counseling/discussion  Advance Care Planning    Code Status  In light of the patients advanced and life limiting illness,I engaged the the patient and family in a voluntary conversation about the patient's preferences for care  at the very end of life. The patient wishes to have a natural, peaceful death.  Along those lines, the patient wishes to have CPR or other invasive treatments performed when his heart and/or breathing stops. I communicated to the patient and family. Continue FULL CODE.    A total of 15 min was spent on advance care planning, goals of care discussion, emotional support, formulating and communicating prognosis and exploring burden/benefit of various approaches of treatment. This discussion occurred on a fully voluntary basis with the verbal consent of the patient and/or family.           VTE Risk Mitigation (From admission, onward)           Ordered     heparin (porcine) injection 1,000 Units  As needed (PRN)         12/24/24 8253      heparin (porcine) injection 1,000 Units  Once         12/21/24 1812     Place sequential compression device  Until discontinued         11/22/24 1329     IP VTE HIGH RISK PATIENT  Once         11/20/24 1622                    Discharge Planning   AUTUMN: 12/27/2024     Code Status: Full Code   Medical Readiness for Discharge Date:   Discharge Plan A: Skilled Nursing Facility   Discharge Delays: None known at this time            Please place Justification for DME        Gioavni Wheat MD  Department of Hospital Medicine   [facility] - Transplant Stepdown

## 2024-12-26 NOTE — PLAN OF CARE
Nephrology Chart Review;    Planing for HD tomorrow, orders in, dialysis unit aware.      Intake/Output Summary (Last 24 hours) at 12/26/2024 1122  Last data filed at 12/26/2024 0455  Gross per 24 hour   Intake 785 ml   Output --   Net 785 ml       Vitals:    12/26/24 0400 12/26/24 0452 12/26/24 0753 12/26/24 1112   BP:  128/60 119/67 109/66   BP Location:  Left arm Left arm Left arm   Patient Position:  Lying Lying Lying   Pulse:  89 85 79   Resp:  18 18 18   Temp:  97.8 °F (36.6 °C) 97.6 °F (36.4 °C) 97.6 °F (36.4 °C)   TempSrc:  Oral Oral Oral   SpO2:  99% 97% 97%   Weight: (!) 140.2 kg (309 lb)      Height:           Recent Labs   Lab 12/24/24  0635 12/25/24  0624 12/26/24  0619   * 132* 132*   K 3.1* 3.6 3.3*    101 101   CO2 20* 23 23   BUN 38* 31* 37*   CREATININE 3.8* 2.7* 2.8*   CALCIUM 8.6* 8.1* 8.4*   PHOS 3.0 2.7 3.2           Please call Nephrology as needed.        Betty Terrazas MD  Nephrology Fellow  [facility] [place]

## 2024-12-26 NOTE — ASSESSMENT & PLAN NOTE
Body mass index is 40.77 kg/m². Morbid obesity complicates all aspects of disease management from diagnostic modalities to treatment. Weight loss encouraged and health benefits explained to patient.

## 2024-12-26 NOTE — ASSESSMENT & PLAN NOTE
Nutrition consulted. Most recent weight and BMI monitored-     Measurements:  Wt Readings from Last 1 Encounters:   12/26/24 (!) 140.2 kg (309 lb)   Body mass index is 40.77 kg/m².    Patient has been screened and assessed by RD.    Malnutrition Type:  Context: acute illness or injury  Level: moderate    Malnutrition Characteristic Summary:  Energy Intake (Malnutrition): less than 75% for greater than 7 days  Subcutaneous Fat (Malnutrition): mild depletion  Muscle Mass (Malnutrition): mild depletion  Fluid Accumulation (Malnutrition): moderate to severe    Interventions/Recommendations (treatment strategy):  1.)

## 2024-12-26 NOTE — ASSESSMENT & PLAN NOTE
Anemia is likely due to chronic disease due to Chronic liver disease. Most recent hemoglobin and hematocrit are listed below.  Recent Labs     12/24/24  0635 12/25/24  0624 12/26/24  0619   HGB 7.7* 7.6* 7.9*   HCT 22.9* 22.5* 23.6*       Plan  - Monitor serial CBC: Daily  - Transfuse PRBC if patient becomes hemodynamically unstable, symptomatic or H/H drops below 7/21.  - Patient has received 1 units of PRBCs on 11/20, 11/21, 12/15  - Patient's anemia is currently stable  - Hb baseline 7-8. No obvious bleeding  - Eliquis held on admission.  DVT prophylaxis held.

## 2024-12-26 NOTE — ASSESSMENT & PLAN NOTE
Etoh Cirrhosis  Hx of HCC s/p Y90    MELD 3.0: 29 at 12/25/2024  6:24 AM  MELD-Na: 30 at 12/25/2024  6:24 AM  Calculated from:  Serum Creatinine: On dialysis. Using the maximum value.  Serum Sodium: 132 mmol/L at 12/25/2024  6:24 AM  Total Bilirubin: 3 mg/dL at 12/25/2024  6:24 AM  Serum Albumin: 2.1 g/dL at 12/25/2024  6:24 AM  INR(ratio): 1.5 at 12/23/2024  7:15 AM  Age at listing (hypothetical): 69 years  Sex: Male at 12/25/2024  6:24 AM    Cont lactulose    H/O HCC: treated with Y90 in June 2023.Last MRI abd w/o evidence of residual or recurrent disease 3/24/24. Due for repeat surveillance MRI as an outpatient.    Appreciate hepatology recommendations.   Current concerns regarding transplant include higher BMI, frailty / debility.       Discharge planning to SNF once dialysis set up

## 2024-12-27 LAB
ALBUMIN SERPL BCP-MCNC: 2.1 G/DL (ref 3.5–5.2)
ANION GAP SERPL CALC-SCNC: 8 MMOL/L (ref 8–16)
BUN SERPL-MCNC: 38 MG/DL (ref 8–23)
CALCIUM SERPL-MCNC: 8.3 MG/DL (ref 8.7–10.5)
CHLORIDE SERPL-SCNC: 103 MMOL/L (ref 95–110)
CO2 SERPL-SCNC: 21 MMOL/L (ref 23–29)
CREAT SERPL-MCNC: 2.5 MG/DL (ref 0.5–1.4)
EST. GFR  (NO RACE VARIABLE): 26.8 ML/MIN/1.73 M^2
GLUCOSE SERPL-MCNC: 94 MG/DL (ref 70–110)
PHOSPHATE SERPL-MCNC: 3.3 MG/DL (ref 2.7–4.5)
POTASSIUM SERPL-SCNC: 3.1 MMOL/L (ref 3.5–5.1)
POTASSIUM SERPL-SCNC: 3.1 MMOL/L (ref 3.5–5.1)
SODIUM SERPL-SCNC: 132 MMOL/L (ref 136–145)

## 2024-12-27 PROCEDURE — 63600175 PHARM REV CODE 636 W HCPCS

## 2024-12-27 PROCEDURE — 25000003 PHARM REV CODE 250: Performed by: INTERNAL MEDICINE

## 2024-12-27 PROCEDURE — 25000003 PHARM REV CODE 250

## 2024-12-27 PROCEDURE — 63600175 PHARM REV CODE 636 W HCPCS: Performed by: HOSPITALIST

## 2024-12-27 PROCEDURE — 25000003 PHARM REV CODE 250: Performed by: STUDENT IN AN ORGANIZED HEALTH CARE EDUCATION/TRAINING PROGRAM

## 2024-12-27 PROCEDURE — 80069 RENAL FUNCTION PANEL: CPT | Performed by: INTERNAL MEDICINE

## 2024-12-27 PROCEDURE — 84132 ASSAY OF SERUM POTASSIUM: CPT | Performed by: INTERNAL MEDICINE

## 2024-12-27 PROCEDURE — 20600001 HC STEP DOWN PRIVATE ROOM

## 2024-12-27 PROCEDURE — 80100016 HC MAINTENANCE HEMODIALYSIS

## 2024-12-27 PROCEDURE — 36415 COLL VENOUS BLD VENIPUNCTURE: CPT | Performed by: INTERNAL MEDICINE

## 2024-12-27 RX ORDER — SODIUM CHLORIDE 9 MG/ML
INJECTION, SOLUTION INTRAVENOUS ONCE
Status: DISCONTINUED | OUTPATIENT
Start: 2024-12-28 | End: 2024-12-27

## 2024-12-27 RX ORDER — METOPROLOL TARTRATE 25 MG/1
12.5 TABLET, FILM COATED ORAL 2 TIMES DAILY
Start: 2024-12-27 | End: 2025-12-27

## 2024-12-27 RX ORDER — CEFADROXIL 1000 MG/1
1 TABLET ORAL ONCE
Status: COMPLETED | OUTPATIENT
Start: 2024-12-27 | End: 2024-12-27

## 2024-12-27 RX ORDER — POTASSIUM CHLORIDE 20 MEQ/1
40 TABLET, EXTENDED RELEASE ORAL ONCE
Status: DISCONTINUED | OUTPATIENT
Start: 2024-12-27 | End: 2024-12-27

## 2024-12-27 RX ORDER — CEFADROXIL 500 MG/1
500 CAPSULE ORAL
Start: 2024-12-27 | End: 2024-12-30

## 2024-12-27 RX ORDER — POTASSIUM CHLORIDE 20 MEQ/1
40 TABLET, EXTENDED RELEASE ORAL ONCE
Status: COMPLETED | OUTPATIENT
Start: 2024-12-27 | End: 2024-12-27

## 2024-12-27 RX ORDER — SODIUM CHLORIDE 9 MG/ML
INJECTION, SOLUTION INTRAVENOUS ONCE
Status: COMPLETED | OUTPATIENT
Start: 2024-12-27 | End: 2024-12-27

## 2024-12-27 RX ORDER — POLYETHYLENE GLYCOL 3350 17 G/17G
17 POWDER, FOR SOLUTION ORAL 2 TIMES DAILY PRN
Start: 2024-12-27

## 2024-12-27 RX ORDER — MIDODRINE HYDROCHLORIDE 5 MG/1
15 TABLET ORAL EVERY 8 HOURS
Start: 2024-12-27 | End: 2025-12-27

## 2024-12-27 RX ADMIN — POTASSIUM CHLORIDE 40 MEQ: 1500 TABLET, EXTENDED RELEASE ORAL at 01:12

## 2024-12-27 RX ADMIN — METOPROLOL TARTRATE 6.25 MG: 25 TABLET, FILM COATED ORAL at 01:12

## 2024-12-27 RX ADMIN — LACTULOSE 30 G: 20 SOLUTION ORAL at 09:12

## 2024-12-27 RX ADMIN — MUPIROCIN: 20 OINTMENT TOPICAL at 09:12

## 2024-12-27 RX ADMIN — MIDODRINE HYDROCHLORIDE 15 MG: 5 TABLET ORAL at 06:12

## 2024-12-27 RX ADMIN — HEPARIN SODIUM 1000 UNITS: 1000 INJECTION, SOLUTION INTRAVENOUS; SUBCUTANEOUS at 12:12

## 2024-12-27 RX ADMIN — MICONAZOLE NITRATE 2 % TOPICAL POWDER: at 01:12

## 2024-12-27 RX ADMIN — MIDODRINE HYDROCHLORIDE 15 MG: 5 TABLET ORAL at 01:12

## 2024-12-27 RX ADMIN — MIDODRINE HYDROCHLORIDE 20 MG: 5 TABLET ORAL at 09:12

## 2024-12-27 RX ADMIN — METOPROLOL TARTRATE 6.25 MG: 25 TABLET, FILM COATED ORAL at 09:12

## 2024-12-27 RX ADMIN — CEFADROXIL 1 G: 1000 TABLET ORAL at 01:12

## 2024-12-27 RX ADMIN — MIDODRINE HYDROCHLORIDE 15 MG: 5 TABLET ORAL at 09:12

## 2024-12-27 RX ADMIN — MICONAZOLE NITRATE 2 % TOPICAL POWDER: at 09:12

## 2024-12-27 RX ADMIN — CEFTRIAXONE 1 G: 1 INJECTION, POWDER, FOR SOLUTION INTRAMUSCULAR; INTRAVENOUS at 03:12

## 2024-12-27 RX ADMIN — SODIUM CHLORIDE: 9 INJECTION, SOLUTION INTRAVENOUS at 09:12

## 2024-12-27 RX ADMIN — MUPIROCIN: 20 OINTMENT TOPICAL at 01:12

## 2024-12-27 NOTE — SUBJECTIVE & OBJECTIVE
Interval History: NAEON. Tolerating HD. No acute issues. Discharge planning to SNF.    Review of Systems   Constitutional:  Negative for chills and fever.   HENT:  Negative for nosebleeds.    Respiratory:  Negative for cough.    Cardiovascular:  Positive for leg swelling.   Gastrointestinal:  Positive for abdominal distention. Negative for abdominal pain and diarrhea.   Genitourinary:  Positive for scrotal swelling.   Skin:  Positive for pallor and wound.   Neurological:  Negative for dizziness and light-headedness.   Psychiatric/Behavioral:  Negative for confusion.    All other systems reviewed and are negative.    Objective:     Vital Signs (Most Recent):  Temp: 98.1 °F (36.7 °C) (12/27/24 1532)  Pulse: 88 (12/27/24 1532)  Resp: 17 (12/27/24 1532)  BP: 103/68 (12/27/24 1532)  SpO2: 98 % (12/27/24 1532) Vital Signs (24h Range):  Temp:  [97.7 °F (36.5 °C)-98.5 °F (36.9 °C)] 98.1 °F (36.7 °C)  Pulse:  [] 88  Resp:  [16-18] 17  SpO2:  [97 %-100 %] 98 %  BP: ()/(51-70) 103/68   Weight: (!) 140.2 kg (309 lb)  Body mass index is 40.77 kg/m².      Intake/Output Summary (Last 24 hours) at 12/27/2024 1638  Last data filed at 12/27/2024 1311  Gross per 24 hour   Intake 1570 ml   Output 2100 ml   Net -530 ml          Physical Exam  Vitals and nursing note reviewed.   Constitutional:       General: He is awake. He is not in acute distress.     Appearance: He is obese. He is ill-appearing.   HENT:      Head:      Comments: Contusion and small bump around right glabella.      Mouth/Throat:      Comments: Lower lip injury mostly healed  Eyes:      Extraocular Movements: Extraocular movements intact.      Conjunctiva/sclera: Conjunctivae normal.   Cardiovascular:      Rate and Rhythm: Normal rate.   Pulmonary:      Effort: Pulmonary effort is normal. No respiratory distress.   Abdominal:      General: There is distension.      Palpations: Abdomen is soft.      Tenderness: There is no abdominal tenderness.    Musculoskeletal:         General: No swelling or tenderness.      Right lower leg: Edema present.      Left lower leg: Edema present.      Comments: 2+ pitting edema in bilateral lower extremities   Skin:     General: Skin is warm.      Coloration: Skin is not jaundiced.      Findings: Bruising present.   Neurological:      Mental Status: He is alert and oriented to person, place, and time.      Motor: No weakness.   Psychiatric:         Behavior: Behavior normal.            Significant Labs:    CBC/Anemia Profile:  Recent Labs   Lab 12/26/24  0619   WBC 7.84   HGB 7.9*   HCT 23.6*   PLT 78*   MCV 83   RDW 27.4*        Chemistries:  Recent Labs   Lab 12/26/24  0619 12/26/24  1122 12/27/24  0901 12/27/24  1316   *  --  132*  --    K 3.3*  --  3.1* 3.1*     --  103  --    CO2 23  --  21*  --    BUN 37*  --  38*  --    CREATININE 2.8*  --  2.5*  --    CALCIUM 8.4*  --  8.3*  --    ALBUMIN 2.2*  --  2.1*  --    PROT 5.8*  --   --   --    BILITOT 2.9*  --   --   --    ALKPHOS 96  --   --   --    ALT 19  --   --   --    AST 34  --   --   --    MG  --  1.9  --   --    PHOS 3.2  --  3.3  --        All pertinent labs within the past 24 hours have been reviewed.    Significant Imaging:  I have reviewed all pertinent imaging results/findings within the past 24 hours.

## 2024-12-27 NOTE — ASSESSMENT & PLAN NOTE
Hyponatremia is likely due to Cirrhosis. The patient's most recent sodium results are listed below.  Recent Labs     12/25/24  0624 12/26/24  0619 12/27/24  0901   * 132* 132*       Plan  - Correct the sodium by 4-6mEq in 24 hours.   - Appreciate nephrology recommendations  - Monitor sodium daily  - Patient hyponatremia is stable  - Mgmt with HD

## 2024-12-27 NOTE — PT/OT/SLP PROGRESS
Physical Therapy      Patient Name:  Juan Carlos BROOKS Fredo Julian.   MRN:  [id number]    Patient not seen today secondary to Dialysis. Will follow-up as appropriate.

## 2024-12-27 NOTE — ASSESSMENT & PLAN NOTE
2/2 cirrhosis. See plan below    Received 3 doses of IV vitamin K.  End Stage Liver Disease precipitated coagulopathy  Heparin d/c d/t thrombocytopenia  Cont to hold eliquis on discharge

## 2024-12-27 NOTE — PLAN OF CARE
[facility]  Julianna called  [phone], Insurance Authorization  before patient can discharge today. Patient will discharge once  Authorization complete. Patient still will go to same room and orders are already received and processed. Ambulance will have to be rescheduled.     Ham Carrillo, RN, BSN  Case Management  [phone]

## 2024-12-27 NOTE — NURSING
Dialysis tx started to the right chest perm cath per orders placed by Dr. Terrazas:    Order Questions    Question Answer Comment   Antibiotics on HD? No    Duration of Treatment 3 hours    Dialyzer F180NR    Dialysate Temperature (C) 36    Target  mL/min    If unable to maintain flow due to inadequate vascular access patency, patient intolerance (i.e. chest pain, access discomfort) or elevated venous pressure, adjust blood flow rate to a minimum of _____mL/min 100     mL/min    K+ Potassium per Protocol    Ca++ Calcium per Protocol    Na+ Sodium per Protocol    Bicarb Bicarbonate per Protocol    Access to be used Other (please specify)    Target UF Other (please specify) 1-1.5 liters   If unable to maintain this UFR due to patient intolerance (i.e. hypotension, chest pain, muscle cramping, nausea or vomiting), adjust UFR to achieve a minimum of _______ liters of UF 0    Fluid Removal Instructions Sequential UF, Hold off UF when SBP < 90 mm hg

## 2024-12-27 NOTE — ASSESSMENT & PLAN NOTE
JAE is likely due to pre-renal azotemia due to intravascular volume depletion secondary to decompensated hepatic cirrhosis with volume overload and acute tubular necrosis caused by hemodynamic instability, renal hypoperfusion, severe sepsis with GNR bacteremia. Initial presentation concerning for hepatorenal syndrome, transferred to MICU for vasopressors without success in reaching MAP goal 85-90 for treatment of HRS. Currently, patient with oligouric JAE more contributed by ATN as above.     Recommendations:  - HD today  -Strict I/Os  -Outpatient HD management per the allotted unit

## 2024-12-27 NOTE — ASSESSMENT & PLAN NOTE
Anemia is likely due to chronic disease due to Chronic liver disease. Most recent hemoglobin and hematocrit are listed below.  Recent Labs     12/25/24  0624 12/26/24  0619   HGB 7.6* 7.9*   HCT 22.5* 23.6*       Plan  - Monitor serial CBC: Daily  - Transfuse PRBC if patient becomes hemodynamically unstable, symptomatic or H/H drops below 7/21.  - Patient has received 1 units of PRBCs on 11/20, 11/21, 12/15  - Patient's anemia is currently stable  - Hb baseline 7-8. No obvious bleeding  - Eliquis held on admission. DVT prophylaxis held.

## 2024-12-27 NOTE — PT/OT/SLP PROGRESS
Occupational Therapy   Treatment    Name: Juan Carlos Yoo Sr.  MRN: [id number]  Admitting Diagnosis:  Decompensated cirrhosis       Recommendations:     Discharge Recommendations: Moderate Intensity Therapy  Discharge Equipment Recommendations:  wheelchair, walker, rolling, bedside commode, grab bar, bath bench  Barriers to discharge:  Decreased caregiver support, Other (Comment) (patient requries increased level of assistance)    Assessment:     Juan Carlos Yoo Sr. is a 69 y.o. male with a medical diagnosis of Decompensated cirrhosis.  He presents with the fallowing performance deficits affecting function are weakness, impaired endurance, impaired self care skills, impaired functional mobility, gait instability, impaired balance, pain, decreased safety awareness, decreased lower extremity function, decreased upper extremity function, impaired cardiopulmonary response to activity, edema, decreased coordination. Patient agreeable to tx session, patient is demonstrating progress with functional transfers, bed mobility, and self-care task, however; patient continues to have limited activity tolerance due to pain and weakness from recent surgical procedure. Patient would benefit from Moderate intensity therapy intervention to address over all functional decline with mobility task, endurance, and ADLs in order to return to PLOF.     Rehab Prognosis:  Good; patient would benefit from acute skilled OT services to address these deficits and reach maximum level of function.       Plan:     Patient to be seen 4 x/week to address the above listed problems via self-care/home management, therapeutic activities, therapeutic exercises, neuromuscular re-education  Plan of Care Expires: 12/25/24  Plan of Care Reviewed with: patient    Subjective     Chief Complaint: non verbalized   Patient/Family Comments/goals: to return to PLOF  Pain/Comfort:  Pain Rating 1: 0/10    Objective:     Communicated with: Nurse prior to session.  Patient found  up in chair with Trialysis upon OT entry to room.    General Precautions: Standard, fall    Orthopedic Precautions:N/A  Braces: N/A  Respiratory Status: Room air     Occupational Performance:      Functional Mobility/Transfers:  Patient completed Sit <> Stand Transfer from bedside chair  with stand by assistance  with  rolling walker   Patient completed Toilet Transfer Step Transfer technique with stand by assistance and contact guard assistance with  rolling walker and grab bars  Functional Mobility: Patient engaged in functional mobility task throughout hospital room/hallway with SBA with RW to maximize functional endurance and standing balance required for home/community mobility and occupational engagement. Patient required verbal cues to manage walker properly.      Activities of Daily Living:  Upper Body Dressing: maximal assistance to janiya back gown   Lower Body Dressing: total assistance to janiya/doff socks   Toileting: stand by assistance for pericare hygiene task in standing       [facility] 6 Click ADL: 17    Treatment & Education:  Discussed OT POC and progress  Educated patient on the importance to continue to perform exercises in order to reduce stiffness and promote joint mobility and blood flow  Addressed patient's questions and concerns within [facility] scope of practice      Patient left up in chair with all lines intact, call button in reach, and PCT notified    GOALS:   Multidisciplinary Problems       Occupational Therapy Goals          Problem: Occupational Therapy    Goal Priority Disciplines Outcome Interventions   Occupational Therapy Goal     OT, PT/OT Progressing    Description: Goals to be met by: 12/25/24     Patient will increase functional independence with ADLs by performing:    UE Dressing with Set-up Assistance.  LE Dressing with Stand-by Assistance.  Grooming while standing at sink with Stand-by Assistance. Met 12/17  Revised to (S).  Toileting from toilet with Stand-by Assistance for hygiene  and clothing management.   Step transfer: with SBA and use of LRAD  Supine to sit with SBA. Met 12/17  Revised to (I).  Toilet transfer to toilet with SBA and use of LRAD.  Independent with HEP to BUE to improve ROM                         Time Tracking:     OT Date of Treatment: 12/26/24  OT Start Time: 1415  OT Stop Time: 1452  OT Total Time (min): 37 min    Billable Minutes:Self Care/Home Management 17  Therapeutic Activity 20    OT/EDWARDO: EDWARDO     Number of EDWARDO visits since last OT visit: 2    12/26/2024

## 2024-12-27 NOTE — PLAN OF CARE
HD done today - 1.5L removed. Patient was to transfer to SNF today but still awaiting authorization for transfer to other facility -  to follow up tomororw. Patient OOB to bathroom with 2 person assistance and walker - 2 BM today.

## 2024-12-27 NOTE — ASSESSMENT & PLAN NOTE
Etoh Cirrhosis  Hx of HCC s/p Y90    MELD 3.0: 29 at 12/25/2024  6:24 AM  MELD-Na: 30 at 12/25/2024  6:24 AM  Calculated from:  Serum Creatinine: On dialysis. Using the maximum value.  Serum Sodium: 132 mmol/L at 12/25/2024  6:24 AM  Total Bilirubin: 3 mg/dL at 12/25/2024  6:24 AM  Serum Albumin: 2.1 g/dL at 12/25/2024  6:24 AM  INR(ratio): 1.5 at 12/23/2024  7:15 AM  Age at listing (hypothetical): 69 years  Sex: Male at 12/25/2024  6:24 AM    Cont lactulose    H/O HCC: treated with Y90 in June 2023.Last MRI abd w/o evidence of residual or recurrent disease 3/24/24. Due for repeat surveillance MRI as an outpatient.    Appreciate hepatology recommendations.   Current concerns regarding transplant include higher BMI, frailty / debility.       Discharge planning to SNF

## 2024-12-27 NOTE — SUBJECTIVE & OBJECTIVE
Interval History: NAEON, getting HD today.    Review of patient's allergies indicates:  No Known Allergies  Current Facility-Administered Medications   Medication Frequency    0.9% NaCl infusion Once    albuterol-ipratropium 2.5 mg-0.5 mg/3 mL nebulizer solution 3 mL Q6H PRN    aluminum-magnesium hydroxide-simethicone 200-200-20 mg/5 mL suspension 30 mL QID PRN    cefTRIAXone injection 1 g Q24H    dextrose 10% bolus 125 mL 125 mL PRN    dextrose 10% bolus 250 mL 250 mL PRN    diphenhydrAMINE capsule 25 mg Q6H PRN    famotidine tablet 20 mg Every other day    glucagon (human recombinant) injection 1 mg PRN    glucose chewable tablet 16 g PRN    glucose chewable tablet 24 g PRN    heparin (porcine) injection 1,000 Units PRN    lactulose 20 gram/30 mL solution Soln 30 g TID    melatonin tablet 6 mg Nightly PRN    metoprolol 12.5mg/1.25mL oral solution 6.25 mg BID    miconazole NITRATE 2 % top powder BID    midodrine tablet 15 mg PRN    midodrine tablet 15 mg Q8H    midodrine tablet 20 mg Daily PRN    mupirocin 2 % ointment BID    naloxone 0.4 mg/mL injection 0.02 mg PRN    ondansetron injection 4 mg Q8H PRN    polyethylene glycol packet 17 g BID PRN    simethicone chewable tablet 80 mg QID PRN    sodium chloride 0.9% bolus 250 mL 250 mL PRN    sodium chloride 0.9% flush 10 mL PRN       Objective:     Vital Signs (Most Recent):  Temp: 97.9 °F (36.6 °C) (12/27/24 0855)  Pulse: 88 (12/27/24 1115)  Resp: 16 (12/27/24 0855)  BP: (!) 123/55 (12/27/24 1115)  SpO2: 97 % (12/27/24 0743) Vital Signs (24h Range):  Temp:  [97.7 °F (36.5 °C)-98.5 °F (36.9 °C)] 97.9 °F (36.6 °C)  Pulse:  [] 88  Resp:  [16-18] 16  SpO2:  [97 %-100 %] 97 %  BP: ()/(51-70) 123/55     Weight: (!) 140.2 kg (309 lb) (12/26/24 0400)  Body mass index is 40.77 kg/m².  Body surface area is 2.69 meters squared.    I/O last 3 completed shifts:  In: 1692 [P.O.:2062]  Out: 0      Physical Exam  Pulmonary:      Effort: Pulmonary effort is normal. No  respiratory distress.   Musculoskeletal:      Right lower leg: Edema present.      Left lower leg: Edema present.   Skin:     General: Skin is warm.   Neurological:      General: No focal deficit present.      Mental Status: He is alert and oriented to person, place, and time.          Significant Labs:  BMP:   Recent Labs   Lab 12/26/24  1122 12/27/24  0901   GLU  --  94   NA  --  132*   K  --  3.1*   CL  --  103   CO2  --  21*   BUN  --  38*   CREATININE  --  2.5*   CALCIUM  --  8.3*   MG 1.9  --      CBC:   Recent Labs   Lab 12/26/24  0619   WBC 7.84   RBC 2.83*   HGB 7.9*   HCT 23.6*   PLT 78*   MCV 83   MCH 27.9   MCHC 33.5        Significant Imaging:  Labs: Reviewed

## 2024-12-27 NOTE — ASSESSMENT & PLAN NOTE
Baseline creatinine is  1.5 . Most recent creatinine and eGFR are listed below.    Recent Labs     12/25/24  0624 12/26/24  0619 12/27/24  0901   CREATININE 2.7* 2.8* 2.5*   EGFRNORACEVR 24.4* 23.4* 26.8*        Plan  - JAE is worsening. Will continue current treatment  - Avoid nephrotoxins and renally dose meds for GFR listed above  - Monitor urine output, serial BMP, and adjust therapy as needed    - Etiology includes volume overload, obstruction, hypotension, possible HRS  Baseline creatinine is 1.5. May require dialysis long term.   Patient tolerated intermittent HD well (12/12)    IR placed RIJ TDC 12/16.     HD chair arranged. Delayed with holiday scheduled. HD on Friday  and then can go so he is able to get HD on Monday 12/30

## 2024-12-27 NOTE — PLAN OF CARE
12/27/24 1212   Post-Acute Status   Post-Acute Authorization Placement   Post-Acute Placement Status Set-up Complete/Auth obtained   Discharge Delays (!) Ambulance Transport/Facility Transport   Discharge Plan   Discharge Plan A Skilled Nursing Facility   Discharge Plan B Skilled Nursing Facility     ANNY spoke with Julianna with [facility] [phone], patient can be discharged after dialysis here at [facility]. Report can be called in to [facility] [phone]. Patient will be going to [place] [id number]. Address: [address] [place] [address]. Discharge paperwork faxed to [phone].     CM spoke to floor RN, patient in dialysis now and report will be called in to [facility] upon patient return. Patient next dialysis will be outpatient on Monday, facility will transport. Transport order placed. CM will continue to follow until discharged.

## 2024-12-27 NOTE — PROGRESS NOTES
[facility] - Transplant Stepdown  Nephrology  Progress Note    Patient Name: Juan Carlos Yoo Sr.  MRN: [id number]  Admission Date: 11/20/2024  Hospital Length of Stay: 37 days  Attending Provider: Giovani Wheat MD   Primary Care Physician: Nlya Rios FNP  Principal Problem:Decompensated cirrhosis    Subjective:     HPI: Juan Carlos Yoo is a 69 year old male with hx of decompensated hepatic cirrhosis due to alcohol use, HCC s/p Y90, esophageal varices, persistent afib on eliquis, HTN, CKD3a (baseline creatinine 1.2-1.4) admitted on 11/20 for sepsis likely 2/2 SSTI involving RUE and decompensated hepatic cirrhosis with signs of volume overload. Recent admission 10/4 at [facility] for decompensated hepatic cirrhosis where he was discharged with oral diuretic regimen including furosemide 60 mg BID and metolazone 2.5 mg daily, low salt diet with fluid restriction. It is unclear if patient is adherent to these medications or lifestyle modifications. W/u notable for anemia with hb 6.7 s/p 1 unit pRBC 11/20 and JAE on CKD w elevated BUN 49/creatinine 5.9, hyperkalemia (K 6.1>5.1 after shifting), bicarb 18, elevated lactate up to 3.5. Nephrology consulted for JAE with c/o HRS    Interval History: NAEON, getting HD today.    Review of patient's allergies indicates:  No Known Allergies  Current Facility-Administered Medications   Medication Frequency    0.9% NaCl infusion Once    albuterol-ipratropium 2.5 mg-0.5 mg/3 mL nebulizer solution 3 mL Q6H PRN    aluminum-magnesium hydroxide-simethicone 200-200-20 mg/5 mL suspension 30 mL QID PRN    cefTRIAXone injection 1 g Q24H    dextrose 10% bolus 125 mL 125 mL PRN    dextrose 10% bolus 250 mL 250 mL PRN    diphenhydrAMINE capsule 25 mg Q6H PRN    famotidine tablet 20 mg Every other day    glucagon (human recombinant) injection 1 mg PRN    glucose chewable tablet 16 g PRN    glucose chewable tablet 24 g PRN    heparin (porcine) injection 1,000 Units PRN    lactulose 20 gram/30 mL  solution Soln 30 g TID    melatonin tablet 6 mg Nightly PRN    metoprolol 12.5mg/1.25mL oral solution 6.25 mg BID    miconazole NITRATE 2 % top powder BID    midodrine tablet 15 mg PRN    midodrine tablet 15 mg Q8H    midodrine tablet 20 mg Daily PRN    mupirocin 2 % ointment BID    naloxone 0.4 mg/mL injection 0.02 mg PRN    ondansetron injection 4 mg Q8H PRN    polyethylene glycol packet 17 g BID PRN    simethicone chewable tablet 80 mg QID PRN    sodium chloride 0.9% bolus 250 mL 250 mL PRN    sodium chloride 0.9% flush 10 mL PRN       Objective:     Vital Signs (Most Recent):  Temp: 97.9 °F (36.6 °C) (12/27/24 0855)  Pulse: 88 (12/27/24 1115)  Resp: 16 (12/27/24 0855)  BP: (!) 123/55 (12/27/24 1115)  SpO2: 97 % (12/27/24 0743) Vital Signs (24h Range):  Temp:  [97.7 °F (36.5 °C)-98.5 °F (36.9 °C)] 97.9 °F (36.6 °C)  Pulse:  [] 88  Resp:  [16-18] 16  SpO2:  [97 %-100 %] 97 %  BP: ()/(51-70) 123/55     Weight: (!) 140.2 kg (309 lb) (12/26/24 0400)  Body mass index is 40.77 kg/m².  Body surface area is 2.69 meters squared.    I/O last 3 completed shifts:  In: 1692 [P.O.:1692]  Out: 0      Physical Exam  Pulmonary:      Effort: Pulmonary effort is normal. No respiratory distress.   Musculoskeletal:      Right lower leg: Edema present.      Left lower leg: Edema present.   Skin:     General: Skin is warm.   Neurological:      General: No focal deficit present.      Mental Status: He is alert and oriented to person, place, and time.          Significant Labs:  BMP:   Recent Labs   Lab 12/26/24  1122 12/27/24  0901   GLU  --  94   NA  --  132*   K  --  3.1*   CL  --  103   CO2  --  21*   BUN  --  38*   CREATININE  --  2.5*   CALCIUM  --  8.3*   MG 1.9  --      CBC:   Recent Labs   Lab 12/26/24  0619   WBC 7.84   RBC 2.83*   HGB 7.9*   HCT 23.6*   PLT 78*   MCV 83   MCH 27.9   MCHC 33.5        Significant Imaging:  Labs: Reviewed  Assessment/Plan:     Renal/  JAE (acute kidney injury)  JAE is likely due to  pre-renal azotemia due to intravascular volume depletion secondary to decompensated hepatic cirrhosis with volume overload and acute tubular necrosis caused by hemodynamic instability, renal hypoperfusion, severe sepsis with GNR bacteremia. Initial presentation concerning for hepatorenal syndrome, transferred to MICU for vasopressors without success in reaching MAP goal 85-90 for treatment of HRS. Currently, patient with oligouric JAE more contributed by ATN as above.     Recommendations:  - HD today  -Strict I/Os  -Outpatient HD management per the allotted unit            Thank you for your consult. I will follow-up with patient. Please contact us if you have any additional questions.    Betty Terrazas MD  Nephrology  [facility] - Transplant Stepdown

## 2024-12-27 NOTE — ASSESSMENT & PLAN NOTE
Patient has persistent (7 days or more) atrial fibrillation. Patient is currently in atrial fibrillation. [id number] Score: 1. The patients heart rate in the last 24 hours is as follows:  Pulse  Min: 81  Max: 108       Plan  - Patient's afib is currently controlled  Holding home Eliquis in the setting of recent low blood counts, consider continue holding on DC. Will discuss with patient regarding risks and benefits  Continue metoprolol 6.25 BID

## 2024-12-27 NOTE — ASSESSMENT & PLAN NOTE
The likely etiology of thrombocytopenia is liver disease. The patients 3 most recent labs are listed below.  Recent Labs     12/25/24  0624 12/26/24  0619   PLT 62* 78*       Plan  - Will transfuse if platelet count is <50k (if undergoing surgical procedure or have active bleeding).

## 2024-12-27 NOTE — PLAN OF CARE
Problem: Hemodialysis  Goal: Safe, Effective Therapy Delivery  Outcome: Progressing  Goal: Effective Tissue Perfusion  Outcome: Progressing  Goal: Absence of Infection Signs and Symptoms  Outcome: Progressing    Dialysis tx ended to the right chest perm cath, heparin locked, capped.    Net fluid removed: 1.5L    Prn midodrine given during tx    Report given to nurse Sanchez pt awaiting transport from [facility] to room 81864 by stretcher.

## 2024-12-27 NOTE — PROGRESS NOTES
[facility] - Transplant [facility] Medicine  Progress Note    Patient Name: Juan Carlos Yoo Sr.  MRN: [id number]  Patient Class: IP- Inpatient   Admission Date: 11/20/2024  Length of Stay: 37 days  Attending Physician: Giovani Wheat MD  Primary Care Provider: Nyla Rios FNP        Subjective     Principal Problem:Decompensated cirrhosis        HPI:  69M with PMH of alcoholic cirrhosis, esophageal varices, Afib on eliquis, and HTN who presents for c/o worsening diffuse swelling as well as R arm pain/erythema. He was recently hospitalized 1 month ago at [facility] for volume overload in the setting of decompensated cirrhosis. He was treated with IV diuresis and discharged with adjustment to his diuretics, currently on lasix 60mg BID and metolazone 2.5mg every other week as per family. Patient reports diffuse swelling of his upper and lower extremities in addition to distended abdomen and worsening dyspnea, which he believes is due to recent medication adjustment. Family states he is non-compliant with low sodium diet and fluid restriction. Family states he has been compliant with diuretics, but rom't taken eliquis for 3 days due to issue getting refill. He also reports scratching right arm on door frame 3-4 days ago which has become red and painful after wrapping in in bandage. He claims to be compliant with medications, but is a poor historian. He follows with hepatology, not currently active on transplant list. He states he hasn't consumed alcohol for at least 9 months. Has c/o chills, but denies fever, cough, nausea, vomiting, chest pain, dysuria, hematuria, blood in stool. Workup in ED remarkable for VS: T 97.6 HR 94 RR 22 BP 95/56 O2 sat 97% on RA. Hb 6.7, MCV 75, Plt 81, PT/INR 22.6/2.2, Na 128, K 6.1, BUN/Cr 49/5.7, Alb 2.8, AST/ALT 35/9, Ammonia 104, , Trop neg, LA 2.9, CXR: Cardiac size is enlarged similar to prior.  No large volume of pleural fluid noted although there is mild blunting of  the right costophrenic angle which may relate to a small amount of pleural fluid.  Suspected right basilar opacity, to be correlated clinically for infection. RUE venous doppler: No thrombus in central veins of the right upper extremity. Patient received vanc/zosyn and lasix 80mg IVP in ED and will be admitted to hospital medicine service for further management.    Overview/Hospital Course:  69 y.o. male with history of EtOH use disorder, EtOH cirrhosis, HCC s/p y90, morbid obesity BMI 44, HTN, and Afib who presents with severe anasarca and JAE.      Appreciate hepatology and nephrology recommendations.  Diuretics were held because of concern for HRS.  Patient was started on albumin and midodrine per Nephrology recommendations for HRS.  Renal function continued to worsen and recommendation per Nephrology to transfer to ICU for maintaining goal map over 85 on Levophed.  ICU was notified and patient to be assessed to be transferred to ICU.     Patient also has Gram-positive cocci and Gram-negative rods in his blood now.  Possible sources could be got translocation and barrier related infection through skin as he has been significantly edematous and has been oozing.  Has been on vancomycin and Rocephin.  Id was consulted this morning.  Repeat blood cultures were obtained.     Continues to have anemia.  Hemoglobin dropped on 11/20 and was given 1 unit of packed red blood cells.  Did not respond appropriately.  Hemoglobin dropped again to 6.8 on 11/21 and was given 1 unit of packed red blood cells.  Hemoglobin again on 11/22 is 7.2.  No reported melena or hematochezia.  Patient was on Eliquis for atrial fibrillation prior to admission which was held.  Given history of esophageal varices and portal hypertensive gastropathy whereas also could be component of slow bleeding, under production due to CKD and hemolysis while also with decompensated cirrhosis.  Started on Protonix IV b.i.d. and octreotide.     Also clarified  with hepatology.  Given patient's current infection we will await ID recommendations however will be challenging to consider transplant evaluation at this time.  Current concerns regarding transplant include higher BMI, frailty / debility.      Goal clarification with the patient and family.  Patient at this time wants to be full code and continue with Levophed in ICU.  They would consider discussion with palliative care in a day or so if things do not get better.      In MICU patient started on Levophed, however titration remained difficult given tachycardic response from the patient.      11/24 Trialysis and arterial lines placed overnight and currently on levo and norepi. SLED today.     11/25 Boo dc. Increased midodrine. Continue steroids until d/c pressors. Discussed with Nephrology about our concern for sustained use of pressors to achieve their MAP goals of 85. Will follow up tomorrow.      11/26 Platelets 48. Repeat 38 confirming thrombocytopenia. Concern for HIT. D/c heparin. Increased lactulose dosing. Bladder scan revealed urine in bladder. Boo placed. Off vaso. Continuing levo. Maintain MAP goals 80. PT/OT consulted.      11/27 MAP goal 75-80. Heparin antibody result pending.      11/28 Pt with abdominal pain, decreased bowel movements, emesis. Lactic acid 2.9 and repeat 2.7. Less concerned for mesenteric ischemia and more of a concern was for SBO. NG tube placed with subsequent suction of 500mL fluid. Abdomen less distended after placement and pt subsequently had bowel movement. MAP Goal of 60 with plan to come off pressors entirely and switch to dialysis after permacath, as he is not  liver transplant eligible at this time.      11/29 Pt with ileus. Clear liquids for now. Platelets 24. HIT versus zosyn induced? Peripheral smear sent. Zosyn changed to kaylah. Consent and transfuse 1U. GOC conversation today. Pt opting for long term dialysis.      11/30 naeon. Started back on heparin. One time dose of  120mg lasix today. Hold off SLED/SCUF today and give IV lasix 120 mg once; plan for SLED/SCUF tomorrow      12/1 Patient is becoming more dependent on dialysis. Not a current liver transplant candidate. Still complaining of abdominal pain after discontinuing the NGT. AXR with evidence of dilated bowel loops. Brown bomb administered. The days following administration of the brown bomb, patient had more frequent bowel movements and was able to pass flatulence. Constipation eventually resolved.   Given the need for pressor support, he was worked up for adrenal insufficiency which was positive. Consequently, he was started on steroids for adrenal insufficiency. His blood pressures improved, however, he still required pressor support, particularly during dialysis. Pressor support was eventually weaned off. Patient was started on midodrine to help with BP. Nephrology trialed intermittent HD and patient seemed to tolerate it well. Nephrology recommended placement of tunneled catheter for HD. Patient was medically stable for step down to the hospital medicine floors.     Patient underwent testing with cosyntropin stimulation test, results are not compatible with adrenal insufficiency, cont hydrocortisone taper. Patient tolerating HD. IR placed RIJ TDC 12/16. Discharge planning to SNF.    12/19- patient was walking to the bathroom and had a mechanical fall with a his foot stumbling and hit his face of the ledge in the bathroom.  Although he walked with a walker and had nursing staff to help however could not be controlled.  Small contusion of the right glabella and the nose bridge.  CT head without any acute fractures.  Also noting abdominal distention.  We will request a paracentesis.  CT abdomen without any concern for hematoma.    12/20- accepted for nursing facility however awaiting for hemodialysis chair.  Given this is a new  admission possible anticipated discharge on Monday per SNF  and requesting attempts to see if  systolics can improve > 110.  we will lower his Lopressor if possible.  We will hold his Flomax.     12/21- SNF admission on Monday anticipated.     12/22- Blood pressures have been borderline. HD likely tomorrow.     12/23- Cellulitis of R sided abdomen wall, edges marked. No systemic signs. Continued on rocephin. Chlorhexidine bath ordered.     12/24- Cellulitis appears better, continue rocephin, HD completed today.     12/25- HD yesterday, anticipating next session on Friday and so he can get his next HD on Monday. Abdominal cellulitis looks better.     Interval History: NAEON. Tolerating HD. No acute issues. Discharge planning to SNF.    Review of Systems   Constitutional:  Negative for chills and fever.   HENT:  Negative for nosebleeds.    Respiratory:  Negative for cough.    Cardiovascular:  Positive for leg swelling.   Gastrointestinal:  Positive for abdominal distention. Negative for abdominal pain and diarrhea.   Genitourinary:  Positive for scrotal swelling.   Skin:  Positive for pallor and wound.   Neurological:  Negative for dizziness and light-headedness.   Psychiatric/Behavioral:  Negative for confusion.    All other systems reviewed and are negative.    Objective:     Vital Signs (Most Recent):  Temp: 98.1 °F (36.7 °C) (12/27/24 1532)  Pulse: 88 (12/27/24 1532)  Resp: 17 (12/27/24 1532)  BP: 103/68 (12/27/24 1532)  SpO2: 98 % (12/27/24 1532) Vital Signs (24h Range):  Temp:  [97.7 °F (36.5 °C)-98.5 °F (36.9 °C)] 98.1 °F (36.7 °C)  Pulse:  [] 88  Resp:  [16-18] 17  SpO2:  [97 %-100 %] 98 %  BP: ()/(51-70) 103/68   Weight: (!) 140.2 kg (309 lb)  Body mass index is 40.77 kg/m².      Intake/Output Summary (Last 24 hours) at 12/27/2024 1638  Last data filed at 12/27/2024 1311  Gross per 24 hour   Intake 1570 ml   Output 2100 ml   Net -530 ml          Physical Exam  Vitals and nursing note reviewed.   Constitutional:       General: He is awake. He is not in acute distress.     Appearance: He is  obese. He is ill-appearing.   HENT:      Head:      Comments: Contusion and small bump around right glabella.      Mouth/Throat:      Comments: Lower lip injury mostly healed  Eyes:      Extraocular Movements: Extraocular movements intact.      Conjunctiva/sclera: Conjunctivae normal.   Cardiovascular:      Rate and Rhythm: Normal rate.   Pulmonary:      Effort: Pulmonary effort is normal. No respiratory distress.   Abdominal:      General: There is distension.      Palpations: Abdomen is soft.      Tenderness: There is no abdominal tenderness.   Musculoskeletal:         General: No swelling or tenderness.      Right lower leg: Edema present.      Left lower leg: Edema present.      Comments: 2+ pitting edema in bilateral lower extremities   Skin:     General: Skin is warm.      Coloration: Skin is not jaundiced.      Findings: Bruising present.   Neurological:      Mental Status: He is alert and oriented to person, place, and time.      Motor: No weakness.   Psychiatric:         Behavior: Behavior normal.            Significant Labs:    CBC/Anemia Profile:  Recent Labs   Lab 12/26/24  0619   WBC 7.84   HGB 7.9*   HCT 23.6*   PLT 78*   MCV 83   RDW 27.4*        Chemistries:  Recent Labs   Lab 12/26/24  0619 12/26/24  1122 12/27/24  0901 12/27/24  1316   *  --  132*  --    K 3.3*  --  3.1* 3.1*     --  103  --    CO2 23  --  21*  --    BUN 37*  --  38*  --    CREATININE 2.8*  --  2.5*  --    CALCIUM 8.4*  --  8.3*  --    ALBUMIN 2.2*  --  2.1*  --    PROT 5.8*  --   --   --    BILITOT 2.9*  --   --   --    ALKPHOS 96  --   --   --    ALT 19  --   --   --    AST 34  --   --   --    MG  --  1.9  --   --    PHOS 3.2  --  3.3  --        All pertinent labs within the past 24 hours have been reviewed.    Significant Imaging:  I have reviewed all pertinent imaging results/findings within the past 24 hours.    Assessment and Plan     * Decompensated cirrhosis  Etoh Cirrhosis  Hx of HCC s/p Y90    MELD 3.0: 29 at  12/25/2024  6:24 AM  MELD-Na: 30 at 12/25/2024  6:24 AM  Calculated from:  Serum Creatinine: On dialysis. Using the maximum value.  Serum Sodium: 132 mmol/L at 12/25/2024  6:24 AM  Total Bilirubin: 3 mg/dL at 12/25/2024  6:24 AM  Serum Albumin: 2.1 g/dL at 12/25/2024  6:24 AM  INR(ratio): 1.5 at 12/23/2024  7:15 AM  Age at listing (hypothetical): 69 years  Sex: Male at 12/25/2024  6:24 AM    Cont lactulose    H/O HCC: treated with Y90 in June 2023.Last MRI abd w/o evidence of residual or recurrent disease 3/24/24. Due for repeat surveillance MRI as an outpatient.    Appreciate hepatology recommendations.   Current concerns regarding transplant include higher BMI, frailty / debility.       Discharge planning to SNF        Morbid obesity  Body mass index is 40.77 kg/m². Morbid obesity complicates all aspects of disease management from diagnostic modalities to treatment. Weight loss encouraged and health benefits explained to patient.         Adrenal insufficiency  On going problem since admission, most likely multifactorial - component of liver dysfunction, HRS, sepsis on admission.  S/p pressors in ICU     Random cortisol was 6   Persistent hypotension thought to be adrenal insufficiency. Patient underwent testing with cosyntropin stimulation test, results are not compatible with adrenal insufficiency. appreciate endocrinology recommendations   Cont hydrocortisone taper, to complete 5mg on 12/27      Moderate protein-calorie malnutrition  Nutrition consulted. Most recent weight and BMI monitored-     Measurements:  Wt Readings from Last 1 Encounters:   12/26/24 (!) 140.2 kg (309 lb)   Body mass index is 40.77 kg/m².    Patient has been screened and assessed by RD.    Malnutrition Type:  Context: acute illness or injury  Level: moderate    Malnutrition Characteristic Summary:  Energy Intake (Malnutrition): less than 75% for greater than 7 days  Subcutaneous Fat (Malnutrition): mild depletion  Muscle Mass  (Malnutrition): mild depletion  Fluid Accumulation (Malnutrition): moderate to severe    Interventions/Recommendations (treatment strategy):  1.)      Hypotension        Edema  Volume mgmt with RRT      Debility  Patient with Acute on chronic debility due to chronic unspecified fatigue, age-related physical debility, and other reduced mobility. The patient's latest AMPAC (Activity Measure for Post Acute Care) Score is listed below.    AM-PAC Score - How much help does the patient need for each activity listed  Basic Mobility Total Score: 17  Turning over in bed (including adjusting bedclothes, sheets and blankets)?: A little  Sitting down on and standing up from a chair with arms (e.g., wheelchair, bedside commode, etc.): A little  Moving from lying on back to sitting on the side of the bed?: A little  Moving to and from a bed to a chair (including a wheelchair)?: A little  Need to walk in hospital room?: A little  Climbing 3-5 steps with a railing?: A lot    Plan  - Progressive mobility protocol initated  - PT/OT consulted  - Fall precautions in place  - PT/OT recommending LI therapy. Planning for SNF      Advanced care planning/counseling discussion        Palliative care encounter  Goals of care, counseling/discussion  Advance Care Planning  Patient requests FULL CODE status and would like to continue current treatment      Abdominal pain        Gram-negative bacteremia  Blood cultures from admission with B. Diminuta, micrococcus luteus, lysinobacillus species. Seen by ID previously who recommended stopping antibiotics due to concern these were contaminants. Repeat blood cultures have been no growth. Has since been transferred to ICU with increased pressor requirement. Blood cultures repeated on 11/24 are no growth x 24 hours.   11/29 Switched from zosyn to meropenem due to concern of thrombocytopenia.   Finished meropenem course 12/8     Encephalopathy, metabolic  2/2 decompensated cirrhosis and  JAE  Resolved    Hyponatremia  Hyponatremia is likely due to Cirrhosis. The patient's most recent sodium results are listed below.  Recent Labs     12/25/24  0624 12/26/24  0619 12/27/24  0901   * 132* 132*       Plan  - Correct the sodium by 4-6mEq in 24 hours.   - Appreciate nephrology recommendations  - Monitor sodium daily  - Patient hyponatremia is stable  - Mgmt with HD    Thrombocytopenia  The likely etiology of thrombocytopenia is liver disease. The patients 3 most recent labs are listed below.  Recent Labs     12/25/24 0624 12/26/24  0619   PLT 62* 78*       Plan  - Will transfuse if platelet count is <50k (if undergoing surgical procedure or have active bleeding).      Microcytic anemia  Anemia is likely due to chronic disease due to Chronic liver disease. Most recent hemoglobin and hematocrit are listed below.  Recent Labs     12/25/24 0624 12/26/24  0619   HGB 7.6* 7.9*   HCT 22.5* 23.6*       Plan  - Monitor serial CBC: Daily  - Transfuse PRBC if patient becomes hemodynamically unstable, symptomatic or H/H drops below 7/21.  - Patient has received 1 units of PRBCs on 11/20, 11/21, 12/15  - Patient's anemia is currently stable  - Hb baseline 7-8. No obvious bleeding  - Eliquis held on admission. DVT prophylaxis held.     Stage 3a chronic kidney disease  ESRD on HD  Nephrology following  Will try to remove more fluid  Strict intake and output  Renally dose all medications  Avoid nephrotoxic agents  IR placed RIJ TDC 12/16  CM assisting with finding HD chair      JAE (acute kidney injury)  Baseline creatinine is  1.5 . Most recent creatinine and eGFR are listed below.    Recent Labs     12/25/24 0624 12/26/24 0619 12/27/24  0901   CREATININE 2.7* 2.8* 2.5*   EGFRNORACEVR 24.4* 23.4* 26.8*        Plan  - JAE is worsening. Will continue current treatment  - Avoid nephrotoxins and renally dose meds for GFR listed above  - Monitor urine output, serial BMP, and adjust therapy as needed    - Etiology  includes volume overload, obstruction, hypotension, possible HRS  Baseline creatinine is 1.5. May require dialysis long term.   Patient tolerated intermittent HD well (12/12)    IR placed RIJ TDC 12/16.     HD chair arranged. Delayed with holiday scheduled. HD on Friday  and then can go so he is able to get HD on Monday 12/30     Atrial fibrillation  Patient has persistent (7 days or more) atrial fibrillation. Patient is currently in atrial fibrillation. [id number] Score: 1. The patients heart rate in the last 24 hours is as follows:  Pulse  Min: 81  Max: 108       Plan  - Patient's afib is currently controlled  Holding home Eliquis in the setting of recent low blood counts, consider continue holding on DC. Will discuss with patient regarding risks and benefits  Continue metoprolol 6.25 BID     Coagulopathy  2/2 cirrhosis. See plan below    Received 3 doses of IV vitamin K.  End Stage Liver Disease precipitated coagulopathy  Heparin d/c d/t thrombocytopenia  Cont to hold eliquis on discharge          Goals of care, counseling/discussion  Advance Care Planning    Code Status  In light of the patients advanced and life limiting illness,I engaged the the patient and family in a voluntary conversation about the patient's preferences for care  at the very end of life. The patient wishes to have a natural, peaceful death.  Along those lines, the patient wishes to have CPR or other invasive treatments performed when his heart and/or breathing stops. I communicated to the patient and family. Continue FULL CODE.    A total of 15 min was spent on advance care planning, goals of care discussion, emotional support, formulating and communicating prognosis and exploring burden/benefit of various approaches of treatment. This discussion occurred on a fully voluntary basis with the verbal consent of the patient and/or family.           VTE Risk Mitigation (From admission, onward)           Ordered     heparin (porcine) injection  1,000 Units  As needed (PRN)         12/24/24 0945     Place sequential compression device  Until discontinued         11/22/24 1329     IP VTE HIGH RISK PATIENT  Once         11/20/24 1622                    Discharge Planning   AUTUMN: 12/27/2024     Code Status: Full Code   Medical Readiness for Discharge Date: 12/27/2024  Discharge Plan A: Skilled Nursing Facility   Discharge Delays: (!) Ambulance Transport/Facility Transport            Please place Justification for DME        Giovani Wheat MD  Department of Hospital Medicine   [facility] - Transplant Stepdown

## 2024-12-27 NOTE — PT/OT/SLP PROGRESS
Occupational Therapy      Patient Name:  Juan Carlos BROOKS Fredo Julian.   MRN:  [id number]    Patient not seen today secondary to Dialysis this AM. Checked in PM and noted that Pt is still off unit for dialysis. OT unable to reattempt again in the PM. Will follow-up at next date of service .     12/27/2024

## 2024-12-27 NOTE — PLAN OF CARE
Pt AAOx4. VSS, afebrile. No complaints of SOB/CP/discomfort. Facial contusions noted from previous fall. Skin tears on both arms--covered w/ foam dressings. Redness noted to left and right sides of lower abdomen and flanks--Rocephin given per MAR and Miconazole powder applied. Redness on buttocks noted--barrier cream applied. Pt denies pain throughout shift. Voids per urinal at bedside. +3 scrotal edema noted. Lactulose continued per MAR. No BM reported. Planning for HD today followed by possible discharge. Reviewed plan of care w/ pt. Remained free from falls/traumas/injuries. Questions answered and encouraged. Call light at bedside, non-skid socks on. Bed in low position, wheels locked. Standard precautions maintained.

## 2024-12-28 PROBLEM — L03.90 CELLULITIS: Status: ACTIVE | Noted: 2024-12-28

## 2024-12-28 LAB
BASOPHILS # BLD AUTO: 0.06 K/UL (ref 0–0.2)
BASOPHILS NFR BLD: 0.8 % (ref 0–1.9)
DIFFERENTIAL METHOD BLD: ABNORMAL
EOSINOPHIL # BLD AUTO: 0.4 K/UL (ref 0–0.5)
EOSINOPHIL NFR BLD: 4.8 % (ref 0–8)
ERYTHROCYTE [DISTWIDTH] IN BLOOD BY AUTOMATED COUNT: 27.3 % (ref 11.5–14.5)
HCT VFR BLD AUTO: 22.4 % (ref 40–54)
HGB BLD-MCNC: 7.1 G/DL (ref 14–18)
IMM GRANULOCYTES # BLD AUTO: 0.08 K/UL (ref 0–0.04)
IMM GRANULOCYTES NFR BLD AUTO: 1 % (ref 0–0.5)
LYMPHOCYTES # BLD AUTO: 0.7 K/UL (ref 1–4.8)
LYMPHOCYTES NFR BLD: 9.3 % (ref 18–48)
MCH RBC QN AUTO: 27.5 PG (ref 27–31)
MCHC RBC AUTO-ENTMCNC: 31.7 G/DL (ref 32–36)
MCV RBC AUTO: 87 FL (ref 82–98)
MONOCYTES # BLD AUTO: 1.7 K/UL (ref 0.3–1)
MONOCYTES NFR BLD: 21.3 % (ref 4–15)
NEUTROPHILS # BLD AUTO: 5 K/UL (ref 1.8–7.7)
NEUTROPHILS NFR BLD: 62.8 % (ref 38–73)
NRBC BLD-RTO: 0 /100 WBC
PLATELET # BLD AUTO: 64 K/UL (ref 150–450)
PMV BLD AUTO: 9.5 FL (ref 9.2–12.9)
RBC # BLD AUTO: 2.58 M/UL (ref 4.6–6.2)
WBC # BLD AUTO: 7.95 K/UL (ref 3.9–12.7)

## 2024-12-28 PROCEDURE — 36415 COLL VENOUS BLD VENIPUNCTURE: CPT | Performed by: INTERNAL MEDICINE

## 2024-12-28 PROCEDURE — 25000003 PHARM REV CODE 250: Performed by: STUDENT IN AN ORGANIZED HEALTH CARE EDUCATION/TRAINING PROGRAM

## 2024-12-28 PROCEDURE — 20600001 HC STEP DOWN PRIVATE ROOM

## 2024-12-28 PROCEDURE — 25000003 PHARM REV CODE 250: Performed by: INTERNAL MEDICINE

## 2024-12-28 PROCEDURE — 63600175 PHARM REV CODE 636 W HCPCS: Performed by: INTERNAL MEDICINE

## 2024-12-28 PROCEDURE — 25000003 PHARM REV CODE 250

## 2024-12-28 PROCEDURE — 85025 COMPLETE CBC W/AUTO DIFF WBC: CPT | Performed by: INTERNAL MEDICINE

## 2024-12-28 RX ORDER — CEFTRIAXONE 1 G/1
1 INJECTION, POWDER, FOR SOLUTION INTRAMUSCULAR; INTRAVENOUS
Status: DISCONTINUED | OUTPATIENT
Start: 2024-12-28 | End: 2024-12-30

## 2024-12-28 RX ADMIN — MICONAZOLE NITRATE 2 % TOPICAL POWDER: at 08:12

## 2024-12-28 RX ADMIN — METOPROLOL TARTRATE 6.25 MG: 25 TABLET, FILM COATED ORAL at 08:12

## 2024-12-28 RX ADMIN — MIDODRINE HYDROCHLORIDE 15 MG: 5 TABLET ORAL at 01:12

## 2024-12-28 RX ADMIN — FAMOTIDINE 20 MG: 20 TABLET ORAL at 08:12

## 2024-12-28 RX ADMIN — CEFTRIAXONE 1 G: 1 INJECTION, POWDER, FOR SOLUTION INTRAMUSCULAR; INTRAVENOUS at 01:12

## 2024-12-28 RX ADMIN — MUPIROCIN: 20 OINTMENT TOPICAL at 08:12

## 2024-12-28 RX ADMIN — MIDODRINE HYDROCHLORIDE 15 MG: 5 TABLET ORAL at 08:12

## 2024-12-28 RX ADMIN — LACTULOSE 30 G: 20 SOLUTION ORAL at 08:12

## 2024-12-28 RX ADMIN — SIMETHICONE 80 MG: 80 TABLET, CHEWABLE ORAL at 08:12

## 2024-12-28 RX ADMIN — DIPHENHYDRAMINE HYDROCHLORIDE 25 MG: 25 CAPSULE ORAL at 01:12

## 2024-12-28 RX ADMIN — DIPHENHYDRAMINE HYDROCHLORIDE 25 MG: 25 CAPSULE ORAL at 08:12

## 2024-12-28 RX ADMIN — MIDODRINE HYDROCHLORIDE 15 MG: 5 TABLET ORAL at 05:12

## 2024-12-28 RX ADMIN — LACTULOSE 30 G: 20 SOLUTION ORAL at 01:12

## 2024-12-28 NOTE — ASSESSMENT & PLAN NOTE
Hyponatremia is likely due to Cirrhosis. The patient's most recent sodium results are listed below.  Recent Labs     12/26/24  0619 12/27/24  0901   * 132*       Plan  - Correct the sodium by 4-6mEq in 24 hours.   - Appreciate nephrology recommendations  - Monitor sodium daily  - Patient hyponatremia is stable  - Mgmt with HD

## 2024-12-28 NOTE — ASSESSMENT & PLAN NOTE
Anemia is likely due to chronic disease due to Chronic liver disease. Most recent hemoglobin and hematocrit are listed below.  Recent Labs     12/26/24  0619   HGB 7.9*   HCT 23.6*       Plan  - Monitor serial CBC: Daily  - Transfuse PRBC if patient becomes hemodynamically unstable, symptomatic or H/H drops below 7/21.  - Patient has received 1 units of PRBCs on 11/20, 11/21, 12/15  - Patient's anemia is currently stable  - Hb baseline 7-8. No obvious bleeding  - Eliquis held on admission. DVT prophylaxis held.

## 2024-12-28 NOTE — ASSESSMENT & PLAN NOTE
On going problem since admission, most likely multifactorial - component of liver dysfunction, HRS, sepsis on admission.  S/p pressors in ICU     Random cortisol was 6   Persistent hypotension thought to be adrenal insufficiency. Patient underwent testing with cosyntropin stimulation test, results are not compatible with adrenal insufficiency. appreciate endocrinology recommendations   Completed hydrocortisone taper

## 2024-12-28 NOTE — ASSESSMENT & PLAN NOTE
ESRD on HD  Nephrology following  Will try to remove more fluid  Strict intake and output  Renally dose all medications  Avoid nephrotoxic agents  IR placed RIJ TDC 12/16  CM arranging outpatient HD

## 2024-12-28 NOTE — PT/OT/SLP PROGRESS
"Occupational Therapy      Patient Name:  Juan Carlos BROOKS Fredo Julian.   MRN:  [id number]    Patient not seen today secondary to eating breakfast at 0848 and needing "a while more" OT unable to return in pm. Will follow-up 12/29.    12/28/2024  "

## 2024-12-28 NOTE — ASSESSMENT & PLAN NOTE
Patient has persistent (7 days or more) atrial fibrillation. Patient is currently in atrial fibrillation. [id number] Score: 1. The patients heart rate in the last 24 hours is as follows:  Pulse  Min: 79  Max: 95       Plan  - Patient's afib is currently controlled  Holding home Eliquis in the setting of recent low blood counts, consider continue holding on DC. Will discuss with patient regarding risks and benefits  Continue metoprolol 6.25 BID

## 2024-12-28 NOTE — PROGRESS NOTES
[facility] - Transplant [facility] Medicine  Progress Note    Patient Name: Juan Carlos Yoo Sr.  MRN: [id number]  Patient Class: IP- Inpatient   Admission Date: 11/20/2024  Length of Stay: 38 days  Attending Physician: Giovani Wheat MD  Primary Care Provider: Nyla Rios FNP        Subjective     Principal Problem:Decompensated cirrhosis        HPI:  69M with PMH of alcoholic cirrhosis, esophageal varices, Afib on eliquis, and HTN who presents for c/o worsening diffuse swelling as well as R arm pain/erythema. He was recently hospitalized 1 month ago at [facility] for volume overload in the setting of decompensated cirrhosis. He was treated with IV diuresis and discharged with adjustment to his diuretics, currently on lasix 60mg BID and metolazone 2.5mg every other week as per family. Patient reports diffuse swelling of his upper and lower extremities in addition to distended abdomen and worsening dyspnea, which he believes is due to recent medication adjustment. Family states he is non-compliant with low sodium diet and fluid restriction. Family states he has been compliant with diuretics, but rom't taken eliquis for 3 days due to issue getting refill. He also reports scratching right arm on door frame 3-4 days ago which has become red and painful after wrapping in in bandage. He claims to be compliant with medications, but is a poor historian. He follows with hepatology, not currently active on transplant list. He states he hasn't consumed alcohol for at least 9 months. Has c/o chills, but denies fever, cough, nausea, vomiting, chest pain, dysuria, hematuria, blood in stool. Workup in ED remarkable for VS: T 97.6 HR 94 RR 22 BP 95/56 O2 sat 97% on RA. Hb 6.7, MCV 75, Plt 81, PT/INR 22.6/2.2, Na 128, K 6.1, BUN/Cr 49/5.7, Alb 2.8, AST/ALT 35/9, Ammonia 104, , Trop neg, LA 2.9, CXR: Cardiac size is enlarged similar to prior.  No large volume of pleural fluid noted although there is mild blunting of  the right costophrenic angle which may relate to a small amount of pleural fluid.  Suspected right basilar opacity, to be correlated clinically for infection. RUE venous doppler: No thrombus in central veins of the right upper extremity. Patient received vanc/zosyn and lasix 80mg IVP in ED and will be admitted to hospital medicine service for further management.    Overview/Hospital Course:  69 y.o. male with history of EtOH use disorder, EtOH cirrhosis, HCC s/p y90, morbid obesity BMI 44, HTN, and Afib who presents with severe anasarca and JAE.      Appreciate hepatology and nephrology recommendations.  Diuretics were held because of concern for HRS.  Patient was started on albumin and midodrine per Nephrology recommendations for HRS.  Renal function continued to worsen and recommendation per Nephrology to transfer to ICU for maintaining goal map over 85 on Levophed.  ICU was notified and patient to be assessed to be transferred to ICU.     Patient also has Gram-positive cocci and Gram-negative rods in his blood now.  Possible sources could be got translocation and barrier related infection through skin as he has been significantly edematous and has been oozing.  Has been on vancomycin and Rocephin.  Id was consulted this morning.  Repeat blood cultures were obtained.     Continues to have anemia.  Hemoglobin dropped on 11/20 and was given 1 unit of packed red blood cells.  Did not respond appropriately.  Hemoglobin dropped again to 6.8 on 11/21 and was given 1 unit of packed red blood cells.  Hemoglobin again on 11/22 is 7.2.  No reported melena or hematochezia.  Patient was on Eliquis for atrial fibrillation prior to admission which was held.  Given history of esophageal varices and portal hypertensive gastropathy whereas also could be component of slow bleeding, under production due to CKD and hemolysis while also with decompensated cirrhosis.  Started on Protonix IV b.i.d. and octreotide.     Also clarified  with hepatology.  Given patient's current infection we will await ID recommendations however will be challenging to consider transplant evaluation at this time.  Current concerns regarding transplant include higher BMI, frailty / debility.      Goal clarification with the patient and family.  Patient at this time wants to be full code and continue with Levophed in ICU.  They would consider discussion with palliative care in a day or so if things do not get better.      In MICU patient started on Levophed, however titration remained difficult given tachycardic response from the patient.      11/24 Trialysis and arterial lines placed overnight and currently on levo and norepi. SLED today.     11/25 Boo dc. Increased midodrine. Continue steroids until d/c pressors. Discussed with Nephrology about our concern for sustained use of pressors to achieve their MAP goals of 85. Will follow up tomorrow.      11/26 Platelets 48. Repeat 38 confirming thrombocytopenia. Concern for HIT. D/c heparin. Increased lactulose dosing. Bladder scan revealed urine in bladder. Boo placed. Off vaso. Continuing levo. Maintain MAP goals 80. PT/OT consulted.      11/27 MAP goal 75-80. Heparin antibody result pending.      11/28 Pt with abdominal pain, decreased bowel movements, emesis. Lactic acid 2.9 and repeat 2.7. Less concerned for mesenteric ischemia and more of a concern was for SBO. NG tube placed with subsequent suction of 500mL fluid. Abdomen less distended after placement and pt subsequently had bowel movement. MAP Goal of 60 with plan to come off pressors entirely and switch to dialysis after permacath, as he is not  liver transplant eligible at this time.      11/29 Pt with ileus. Clear liquids for now. Platelets 24. HIT versus zosyn induced? Peripheral smear sent. Zosyn changed to kaylah. Consent and transfuse 1U. GOC conversation today. Pt opting for long term dialysis.      11/30 naeon. Started back on heparin. One time dose of  120mg lasix today. Hold off SLED/SCUF today and give IV lasix 120 mg once; plan for SLED/SCUF tomorrow      12/1 Patient is becoming more dependent on dialysis. Not a current liver transplant candidate. Still complaining of abdominal pain after discontinuing the NGT. AXR with evidence of dilated bowel loops. Brown bomb administered. The days following administration of the brown bomb, patient had more frequent bowel movements and was able to pass flatulence. Constipation eventually resolved.   Given the need for pressor support, he was worked up for adrenal insufficiency which was positive. Consequently, he was started on steroids for adrenal insufficiency. His blood pressures improved, however, he still required pressor support, particularly during dialysis. Pressor support was eventually weaned off. Patient was started on midodrine to help with BP. Nephrology trialed intermittent HD and patient seemed to tolerate it well. Nephrology recommended placement of tunneled catheter for HD. Patient was medically stable for step down to the hospital medicine floors.     Patient underwent testing with cosyntropin stimulation test, results are not compatible with adrenal insufficiency, cont hydrocortisone taper. Patient tolerating HD. IR placed RIJ TDC 12/16. Discharge planning to SNF.    12/19- patient was walking to the bathroom and had a mechanical fall with a his foot stumbling and hit his face of the ledge in the bathroom.  Although he walked with a walker and had nursing staff to help however could not be controlled.  Small contusion of the right glabella and the nose bridge.  CT head without any acute fractures.  Also noting abdominal distention.  We will request a paracentesis.  CT abdomen without any concern for hematoma.    12/20- accepted for nursing facility however awaiting for hemodialysis chair.  Given this is a new  admission possible anticipated discharge on Monday per SNF  and requesting attempts to see if  systolics can improve > 110.  we will lower his Lopressor if possible.  We will hold his Flomax.     - SNF admission on Monday anticipated.     - Blood pressures have been borderline. HD likely tomorrow.     - Cellulitis of R sided abdomen wall, edges marked. No systemic signs. Continued on rocephin. Chlorhexidine bath ordered.     - Cellulitis appears better, continue rocephin, HD completed today.     - HD yesterday, anticipating next session on Friday and so he can get his next HD on Monday. Abdominal cellulitis looks better.     Interval History: NAEON. Tolerating HD. No acute issues. Discharge postponed as insurance auth . Planning for discharge .    Review of Systems   Constitutional:  Negative for chills and fever.   HENT:  Negative for nosebleeds.    Respiratory:  Negative for cough.    Cardiovascular:  Positive for leg swelling.   Gastrointestinal:  Positive for abdominal distention. Negative for abdominal pain and diarrhea.   Genitourinary:  Positive for scrotal swelling.   Skin:  Positive for pallor and wound.   Neurological:  Negative for dizziness and light-headedness.   Psychiatric/Behavioral:  Negative for confusion.    All other systems reviewed and are negative.    Objective:     Vital Signs (Most Recent):  Temp: 98.7 °F (37.1 °C) (24 1153)  Pulse: 86 (24 1153)  Resp: 16 (24 1153)  BP: 96/63 (24 1153)  SpO2: 99 % (24 1153) Vital Signs (24h Range):  Temp:  [98.1 °F (36.7 °C)-98.9 °F (37.2 °C)] 98.7 °F (37.1 °C)  Pulse:  [79-95] 86  Resp:  [16-18] 16  SpO2:  [94 %-100 %] 99 %  BP: ()/(51-68) 96/63   Weight: (!) 140.2 kg (309 lb)  Body mass index is 40.77 kg/m².      Intake/Output Summary (Last 24 hours) at 2024 1248  Last data filed at 2024 1311  Gross per 24 hour   Intake 460 ml   Output --   Net 460 ml          Physical Exam  Vitals and nursing note reviewed.   Constitutional:       General: He is awake. He is not in  "acute distress.     Appearance: He is obese. He is ill-appearing.   HENT:      Head:      Comments: Contusion and small bump around right glabella.      Mouth/Throat:      Comments: Lower lip injury mostly healed  Eyes:      Extraocular Movements: Extraocular movements intact.      Conjunctiva/sclera: Conjunctivae normal.   Cardiovascular:      Rate and Rhythm: Normal rate.   Pulmonary:      Effort: Pulmonary effort is normal. No respiratory distress.   Abdominal:      General: There is distension.      Palpations: Abdomen is soft.      Tenderness: There is no abdominal tenderness.   Musculoskeletal:         General: No swelling or tenderness.      Right lower leg: Edema present.      Left lower leg: Edema present.      Comments: 2+ pitting edema in bilateral lower extremities   Skin:     General: Skin is warm.      Coloration: Skin is not jaundiced.      Findings: Bruising present.   Neurological:      Mental Status: He is alert and oriented to person, place, and time.      Motor: No weakness.   Psychiatric:         Behavior: Behavior normal.            Significant Labs:    CBC/Anemia Profile:  No results for input(s): "WBC", "HGB", "HCT", "PLT", "MCV", "RDW", "IRON", "FERRITIN", "RETIC", "FOLATE", "[id number]", "OCCULTBLOOD" in the last 48 hours.       Chemistries:  Recent Labs   Lab 12/27/24  0901 12/27/24  1316   *  --    K 3.1* 3.1*     --    CO2 21*  --    BUN 38*  --    CREATININE 2.5*  --    CALCIUM 8.3*  --    ALBUMIN 2.1*  --    PHOS 3.3  --        All pertinent labs within the past 24 hours have been reviewed.    Significant Imaging:  I have reviewed all pertinent imaging results/findings within the past 24 hours.    Assessment and Plan     * Decompensated cirrhosis  Etoh Cirrhosis  Hx of HCC s/p Y90    MELD 3.0: 29 at 12/25/2024  6:24 AM  MELD-Na: 30 at 12/25/2024  6:24 AM  Calculated from:  Serum Creatinine: On dialysis. Using the maximum value.  Serum Sodium: 132 mmol/L at 12/25/2024  6:24 " AM  Total Bilirubin: 3 mg/dL at 12/25/2024  6:24 AM  Serum Albumin: 2.1 g/dL at 12/25/2024  6:24 AM  INR(ratio): 1.5 at 12/23/2024  7:15 AM  Age at listing (hypothetical): 69 years  Sex: Male at 12/25/2024  6:24 AM    Cont lactulose    H/O HCC: treated with Y90 in June 2023.Last MRI abd w/o evidence of residual or recurrent disease 3/24/24. Due for repeat surveillance MRI as an outpatient.    Appreciate hepatology recommendations.   Current concerns regarding transplant include higher BMI, frailty / debility.       Discharge planning to SNF        Cellulitis  Ceftriaxone  Improving  Plan to complete 10 day course      Morbid obesity  Body mass index is 40.77 kg/m². Morbid obesity complicates all aspects of disease management from diagnostic modalities to treatment. Weight loss encouraged and health benefits explained to patient.         Adrenal insufficiency  On going problem since admission, most likely multifactorial - component of liver dysfunction, HRS, sepsis on admission.  S/p pressors in ICU     Random cortisol was 6   Persistent hypotension thought to be adrenal insufficiency. Patient underwent testing with cosyntropin stimulation test, results are not compatible with adrenal insufficiency. appreciate endocrinology recommendations   Completed hydrocortisone taper      Moderate protein-calorie malnutrition  Nutrition consulted. Most recent weight and BMI monitored-     Measurements:  Wt Readings from Last 1 Encounters:   12/26/24 (!) 140.2 kg (309 lb)   Body mass index is 40.77 kg/m².    Patient has been screened and assessed by RD.    Malnutrition Type:  Context: acute illness or injury  Level: moderate    Malnutrition Characteristic Summary:  Energy Intake (Malnutrition): less than 75% for greater than 7 days  Subcutaneous Fat (Malnutrition): mild depletion  Muscle Mass (Malnutrition): mild depletion  Fluid Accumulation (Malnutrition): moderate to severe    Interventions/Recommendations (treatment  strategy):  1.)      Hypotension        Edema  Volume mgmt with RRT      Debility  Patient with Acute on chronic debility due to chronic unspecified fatigue, age-related physical debility, and other reduced mobility. The patient's latest AMPAC (Activity Measure for Post Acute Care) Score is listed below.    AM-PAC Score - How much help does the patient need for each activity listed  Basic Mobility Total Score: 17  Turning over in bed (including adjusting bedclothes, sheets and blankets)?: A little  Sitting down on and standing up from a chair with arms (e.g., wheelchair, bedside commode, etc.): A little  Moving from lying on back to sitting on the side of the bed?: A little  Moving to and from a bed to a chair (including a wheelchair)?: A little  Need to walk in hospital room?: A little  Climbing 3-5 steps with a railing?: A lot    Plan  - Progressive mobility protocol initated  - PT/OT consulted  - Fall precautions in place  - PT/OT recommending LI therapy. Planning for SNF      Advanced care planning/counseling discussion        Palliative care encounter  Goals of care, counseling/discussion  Advance Care Planning  Patient requests FULL CODE status and would like to continue current treatment      Abdominal pain        Gram-negative bacteremia  Blood cultures from admission with B. Diminuta, micrococcus luteus, lysinobacillus species. Seen by ID previously who recommended stopping antibiotics due to concern these were contaminants. Repeat blood cultures have been no growth. Has since been transferred to ICU with increased pressor requirement. Blood cultures repeated on 11/24 are no growth x 24 hours.   11/29 Switched from zosyn to meropenem due to concern of thrombocytopenia.   Finished meropenem course 12/8     Encephalopathy, metabolic  2/2 decompensated cirrhosis and JAE  Resolved    Hyponatremia  Hyponatremia is likely due to Cirrhosis. The patient's most recent sodium results are listed below.  Recent Labs      12/26/24  0619 12/27/24  0901   * 132*       Plan  - Correct the sodium by 4-6mEq in 24 hours.   - Appreciate nephrology recommendations  - Monitor sodium daily  - Patient hyponatremia is stable  - Mgmt with HD    Thrombocytopenia  The likely etiology of thrombocytopenia is liver disease. The patients 3 most recent labs are listed below.  Recent Labs     12/26/24  0619   PLT 78*       Plan  - Will transfuse if platelet count is <50k (if undergoing surgical procedure or have active bleeding).      Microcytic anemia  Anemia is likely due to chronic disease due to Chronic liver disease. Most recent hemoglobin and hematocrit are listed below.  Recent Labs     12/26/24  0619   HGB 7.9*   HCT 23.6*       Plan  - Monitor serial CBC: Daily  - Transfuse PRBC if patient becomes hemodynamically unstable, symptomatic or H/H drops below 7/21.  - Patient has received 1 units of PRBCs on 11/20, 11/21, 12/15  - Patient's anemia is currently stable  - Hb baseline 7-8. No obvious bleeding  - Eliquis held on admission. DVT prophylaxis held.     Stage 3a chronic kidney disease  ESRD on HD  Nephrology following  Will try to remove more fluid  Strict intake and output  Renally dose all medications  Avoid nephrotoxic agents  IR placed RIJ TDC 12/16  CM arranging outpatient HD      JAE (acute kidney injury)  Baseline creatinine is  1.5 . Most recent creatinine and eGFR are listed below.    Recent Labs     12/26/24  0619 12/27/24  0901   CREATININE 2.8* 2.5*   EGFRNORACEVR 23.4* 26.8*        Plan  - JAE is worsening. Will continue current treatment  - Avoid nephrotoxins and renally dose meds for GFR listed above  - Monitor urine output, serial BMP, and adjust therapy as needed    - Etiology includes volume overload, obstruction, hypotension, possible HRS  Baseline creatinine is 1.5. May require dialysis long term.   Patient tolerated intermittent HD well (12/12)    IR placed RIJ TDC 12/16.     HD chair arranged. Delayed with holiday  scheduled.   Discharge postponed as insurance auth . Can likely discharge     Atrial fibrillation  Patient has persistent (7 days or more) atrial fibrillation. Patient is currently in atrial fibrillation. [id number] Score: 1. The patients heart rate in the last 24 hours is as follows:  Pulse  Min: 79  Max: 95       Plan  - Patient's afib is currently controlled  Holding home Eliquis in the setting of recent low blood counts, consider continue holding on DC. Will discuss with patient regarding risks and benefits  Continue metoprolol 6.25 BID     Coagulopathy  2/2 cirrhosis. See plan below    Received 3 doses of IV vitamin K.  End Stage Liver Disease precipitated coagulopathy  Heparin d/c d/t thrombocytopenia  Cont to hold eliquis on discharge          Goals of care, counseling/discussion  Advance Care Planning    Code Status  In light of the patients advanced and life limiting illness,I engaged the the patient and family in a voluntary conversation about the patient's preferences for care  at the very end of life. The patient wishes to have a natural, peaceful death.  Along those lines, the patient wishes to have CPR or other invasive treatments performed when his heart and/or breathing stops. I communicated to the patient and family. Continue FULL CODE.    A total of 15 min was spent on advance care planning, goals of care discussion, emotional support, formulating and communicating prognosis and exploring burden/benefit of various approaches of treatment. This discussion occurred on a fully voluntary basis with the verbal consent of the patient and/or family.           VTE Risk Mitigation (From admission, onward)           Ordered     heparin (porcine) injection 1,000 Units  As needed (PRN)         24 0945     Place sequential compression device  Until discontinued         24 1329     IP VTE HIGH RISK PATIENT  Once         24 1622                    Discharge Planning   AUTUMN:  12/30/2024     Code Status: Full Code   Medical Readiness for Discharge Date: 12/27/2024  Discharge Plan A: Skilled Nursing Facility   Discharge Delays: (!) Ambulance Transport/Facility Transport            Please place Justification for DME        Giovani Wheat MD  Department of Hospital Medicine   [facility] - Transplant Stepdown

## 2024-12-28 NOTE — PLAN OF CARE
"Payor auth  yesterday, awaiting new auth from facility. Ambulance request placed on "will call" via [facility]. CM will follow-up with facility tomorrow to see if new auth received. Team aware.       "

## 2024-12-28 NOTE — PLAN OF CARE
OOB to chair with 2 person standby assistance. RLE with increasing redness noted today; rash noted to back and buttocks. IV Rocephin restarted Q24H; PO Benadryl given prn. Right elbow continues to leak serous fluid - dressings changed PRN. Insurance authorization not received today - plan is for patient to d/c on Monday.

## 2024-12-28 NOTE — PLAN OF CARE
Update - 11:00 AM  Bedside nurse, Laura, received a call from Julianna at [facility]. The payor authorization is still not received, patient's admit will be postponed until Monday (12/30). CM updated team and [facility].      ANNY spoke with nurse Riccardo at [facility] ([phone]) to obtain admit update. Facility does not admit patients on weekends. Nurse will contact Julianna, who spoke with our CM yesterday and confirm auth status. Patient is scheduled for outpatient HD from [facility] Monday. Awaiting return call with final update. Team aware.      Nicolette Hernandes RN  Weekend  - [facility]  [id number]

## 2024-12-28 NOTE — ASSESSMENT & PLAN NOTE
Baseline creatinine is  1.5 . Most recent creatinine and eGFR are listed below.    Recent Labs     24  0619 24  0901   CREATININE 2.8* 2.5*   EGFRNORACEVR 23.4* 26.8*        Plan  - JAE is worsening. Will continue current treatment  - Avoid nephrotoxins and renally dose meds for GFR listed above  - Monitor urine output, serial BMP, and adjust therapy as needed    - Etiology includes volume overload, obstruction, hypotension, possible HRS  Baseline creatinine is 1.5. May require dialysis long term.   Patient tolerated intermittent HD well ()    IR placed RIJ TDC .     HD chair arranged. Delayed with holiday scheduled.   Discharge postponed as insurance auth . Can likely discharge

## 2024-12-28 NOTE — ASSESSMENT & PLAN NOTE
The likely etiology of thrombocytopenia is liver disease. The patients 3 most recent labs are listed below.  Recent Labs     12/26/24  0619   PLT 78*       Plan  - Will transfuse if platelet count is <50k (if undergoing surgical procedure or have active bleeding).

## 2024-12-28 NOTE — SUBJECTIVE & OBJECTIVE
Interval History: NAEON. Tolerating HD. No acute issues. Discharge postponed as insurance auth . Planning for discharge .    Review of Systems   Constitutional:  Negative for chills and fever.   HENT:  Negative for nosebleeds.    Respiratory:  Negative for cough.    Cardiovascular:  Positive for leg swelling.   Gastrointestinal:  Positive for abdominal distention. Negative for abdominal pain and diarrhea.   Genitourinary:  Positive for scrotal swelling.   Skin:  Positive for pallor and wound.   Neurological:  Negative for dizziness and light-headedness.   Psychiatric/Behavioral:  Negative for confusion.    All other systems reviewed and are negative.    Objective:     Vital Signs (Most Recent):  Temp: 98.7 °F (37.1 °C) (24 1153)  Pulse: 86 (24 1153)  Resp: 16 (24 1153)  BP: 96/63 (24 1153)  SpO2: 99 % (24 1153) Vital Signs (24h Range):  Temp:  [98.1 °F (36.7 °C)-98.9 °F (37.2 °C)] 98.7 °F (37.1 °C)  Pulse:  [79-95] 86  Resp:  [16-18] 16  SpO2:  [94 %-100 %] 99 %  BP: ()/(51-68) 96/63   Weight: (!) 140.2 kg (309 lb)  Body mass index is 40.77 kg/m².      Intake/Output Summary (Last 24 hours) at 2024 1248  Last data filed at 2024 1311  Gross per 24 hour   Intake 460 ml   Output --   Net 460 ml          Physical Exam  Vitals and nursing note reviewed.   Constitutional:       General: He is awake. He is not in acute distress.     Appearance: He is obese. He is ill-appearing.   HENT:      Head:      Comments: Contusion and small bump around right glabella.      Mouth/Throat:      Comments: Lower lip injury mostly healed  Eyes:      Extraocular Movements: Extraocular movements intact.      Conjunctiva/sclera: Conjunctivae normal.   Cardiovascular:      Rate and Rhythm: Normal rate.   Pulmonary:      Effort: Pulmonary effort is normal. No respiratory distress.   Abdominal:      General: There is distension.      Palpations: Abdomen is soft.      Tenderness: There  "is no abdominal tenderness.   Musculoskeletal:         General: No swelling or tenderness.      Right lower leg: Edema present.      Left lower leg: Edema present.      Comments: 2+ pitting edema in bilateral lower extremities   Skin:     General: Skin is warm.      Coloration: Skin is not jaundiced.      Findings: Bruising present.   Neurological:      Mental Status: He is alert and oriented to person, place, and time.      Motor: No weakness.   Psychiatric:         Behavior: Behavior normal.            Significant Labs:    CBC/Anemia Profile:  No results for input(s): "WBC", "HGB", "HCT", "PLT", "MCV", "RDW", "IRON", "FERRITIN", "RETIC", "FOLATE", "[id number]", "OCCULTBLOOD" in the last 48 hours.       Chemistries:  Recent Labs   Lab 12/27/24  0901 12/27/24  1316   *  --    K 3.1* 3.1*     --    CO2 21*  --    BUN 38*  --    CREATININE 2.5*  --    CALCIUM 8.3*  --    ALBUMIN 2.1*  --    PHOS 3.3  --        All pertinent labs within the past 24 hours have been reviewed.    Significant Imaging:  I have reviewed all pertinent imaging results/findings within the past 24 hours.  "

## 2024-12-29 LAB
ALBUMIN SERPL BCP-MCNC: 2 G/DL (ref 3.5–5.2)
ALP SERPL-CCNC: 75 U/L (ref 40–150)
ALT SERPL W/O P-5'-P-CCNC: 14 U/L (ref 10–44)
ANION GAP SERPL CALC-SCNC: 6 MMOL/L (ref 8–16)
AST SERPL-CCNC: 26 U/L (ref 10–40)
BASOPHILS # BLD AUTO: 0 K/UL (ref 0–0.2)
BASOPHILS NFR BLD: 0 % (ref 0–1.9)
BILIRUB SERPL-MCNC: 2.2 MG/DL (ref 0.1–1)
BUN SERPL-MCNC: 38 MG/DL (ref 8–23)
CALCIUM SERPL-MCNC: 8.2 MG/DL (ref 8.7–10.5)
CHLORIDE SERPL-SCNC: 104 MMOL/L (ref 95–110)
CO2 SERPL-SCNC: 21 MMOL/L (ref 23–29)
CREAT SERPL-MCNC: 2.2 MG/DL (ref 0.5–1.4)
DIFFERENTIAL METHOD BLD: ABNORMAL
EOSINOPHIL # BLD AUTO: 0.5 K/UL (ref 0–0.5)
EOSINOPHIL NFR BLD: 6.5 % (ref 0–8)
ERYTHROCYTE [DISTWIDTH] IN BLOOD BY AUTOMATED COUNT: 27.5 % (ref 11.5–14.5)
EST. GFR  (NO RACE VARIABLE): 31.2 ML/MIN/1.73 M^2
GLUCOSE SERPL-MCNC: 91 MG/DL (ref 70–110)
HCT VFR BLD AUTO: 24 % (ref 40–54)
HGB BLD-MCNC: 7.9 G/DL (ref 14–18)
IMM GRANULOCYTES # BLD AUTO: 0.07 K/UL (ref 0–0.04)
IMM GRANULOCYTES NFR BLD AUTO: 1 % (ref 0–0.5)
LYMPHOCYTES # BLD AUTO: 0.7 K/UL (ref 1–4.8)
LYMPHOCYTES NFR BLD: 10.2 % (ref 18–48)
MAGNESIUM SERPL-MCNC: 1.9 MG/DL (ref 1.6–2.6)
MCH RBC QN AUTO: 28.2 PG (ref 27–31)
MCHC RBC AUTO-ENTMCNC: 32.9 G/DL (ref 32–36)
MCV RBC AUTO: 86 FL (ref 82–98)
MONOCYTES # BLD AUTO: 1.2 K/UL (ref 0.3–1)
MONOCYTES NFR BLD: 17.1 % (ref 4–15)
NEUTROPHILS # BLD AUTO: 4.6 K/UL (ref 1.8–7.7)
NEUTROPHILS NFR BLD: 65.2 % (ref 38–73)
NRBC BLD-RTO: 0 /100 WBC
PHOSPHATE SERPL-MCNC: 3.2 MG/DL (ref 2.7–4.5)
PLATELET # BLD AUTO: 75 K/UL (ref 150–450)
PMV BLD AUTO: 11 FL (ref 9.2–12.9)
POTASSIUM SERPL-SCNC: 3 MMOL/L (ref 3.5–5.1)
PROT SERPL-MCNC: 5.3 G/DL (ref 6–8.4)
RBC # BLD AUTO: 2.8 M/UL (ref 4.6–6.2)
SODIUM SERPL-SCNC: 131 MMOL/L (ref 136–145)
WBC # BLD AUTO: 7.09 K/UL (ref 3.9–12.7)

## 2024-12-29 PROCEDURE — 97535 SELF CARE MNGMENT TRAINING: CPT

## 2024-12-29 PROCEDURE — 94761 N-INVAS EAR/PLS OXIMETRY MLT: CPT

## 2024-12-29 PROCEDURE — 25000003 PHARM REV CODE 250: Performed by: STUDENT IN AN ORGANIZED HEALTH CARE EDUCATION/TRAINING PROGRAM

## 2024-12-29 PROCEDURE — 97116 GAIT TRAINING THERAPY: CPT | Mod: CQ

## 2024-12-29 PROCEDURE — 63600175 PHARM REV CODE 636 W HCPCS: Performed by: INTERNAL MEDICINE

## 2024-12-29 PROCEDURE — 25000003 PHARM REV CODE 250

## 2024-12-29 PROCEDURE — 36415 COLL VENOUS BLD VENIPUNCTURE: CPT | Performed by: INTERNAL MEDICINE

## 2024-12-29 PROCEDURE — 83735 ASSAY OF MAGNESIUM: CPT | Performed by: INTERNAL MEDICINE

## 2024-12-29 PROCEDURE — 80053 COMPREHEN METABOLIC PANEL: CPT | Performed by: INTERNAL MEDICINE

## 2024-12-29 PROCEDURE — 20600001 HC STEP DOWN PRIVATE ROOM

## 2024-12-29 PROCEDURE — 85025 COMPLETE CBC W/AUTO DIFF WBC: CPT | Performed by: INTERNAL MEDICINE

## 2024-12-29 PROCEDURE — 25000003 PHARM REV CODE 250: Performed by: INTERNAL MEDICINE

## 2024-12-29 PROCEDURE — 84100 ASSAY OF PHOSPHORUS: CPT | Performed by: INTERNAL MEDICINE

## 2024-12-29 RX ADMIN — MIDODRINE HYDROCHLORIDE 15 MG: 5 TABLET ORAL at 08:12

## 2024-12-29 RX ADMIN — MIDODRINE HYDROCHLORIDE 15 MG: 5 TABLET ORAL at 03:12

## 2024-12-29 RX ADMIN — MIDODRINE HYDROCHLORIDE 15 MG: 5 TABLET ORAL at 05:12

## 2024-12-29 RX ADMIN — METOPROLOL TARTRATE 6.25 MG: 25 TABLET, FILM COATED ORAL at 08:12

## 2024-12-29 RX ADMIN — POTASSIUM BICARBONATE 50 MEQ: 978 TABLET, EFFERVESCENT ORAL at 11:12

## 2024-12-29 RX ADMIN — MICONAZOLE NITRATE 2 % TOPICAL POWDER: at 09:12

## 2024-12-29 RX ADMIN — DIPHENHYDRAMINE HYDROCHLORIDE 25 MG: 25 CAPSULE ORAL at 11:12

## 2024-12-29 RX ADMIN — LACTULOSE 30 G: 20 SOLUTION ORAL at 03:12

## 2024-12-29 RX ADMIN — MICONAZOLE NITRATE 2 % TOPICAL POWDER: at 08:12

## 2024-12-29 RX ADMIN — LACTULOSE 30 G: 20 SOLUTION ORAL at 08:12

## 2024-12-29 RX ADMIN — CEFTRIAXONE 1 G: 1 INJECTION, POWDER, FOR SOLUTION INTRAMUSCULAR; INTRAVENOUS at 11:12

## 2024-12-29 NOTE — PLAN OF CARE
Problem: Occupational Therapy  Goal: Occupational Therapy Goal  Description: Goals to be met by: 01-12-25     Patient will increase functional independence with ADLs by performing:    UE Dressing with Set-up Assistance.  LE Dressing with Stand-by Assistance.NOT MET  Revised: LBD with Min A  Grooming while standing at sink with Stand-by Assistance. Met 12/17  Revised to (S).  Toileting from toilet with Stand-by Assistance for hygiene and clothing management.   Step transfer: with SBA and use of LRAD  Supine to sit with SBA. Met 12/17  Revised to (I).  Toilet transfer to toilet with SBA and use of LRAD.  Independent with HEP to BUE to improve ROM    Outcome: Progressing   Patient demonstrates a mobility limitation that significantly impairs their ability to participate in one or more mobility related activities of daily living. Patient's mobility limitation cannot be sufficiently resolved with the use of a cane, but can be sufficiently resolved with the use of a rolling walker.The use of a rolling walker will considerably improve their ability to participate in MRADLs. Patient will use the walker on a regular basis at home.   Patient has a mobility limitation that significantly impairs their ability to participate in one or more mobility related activities of daily living in customary locations in the home. The mobility limitation cannot be sufficiently resolved by the use of a cane or walker. The use of a manual wheelchair will greatly improve the patient's ability to participate in MRADLs. The patient will use the wheelchair on a regular basis at home. They have expressed their willingness to use a manual wheelchair in the home, and have a caregiver who is available and willing to assist with the wheelchair if needed.  Patient has a mobility limitation that significantly impairs their ability to participate in one or more mobility related activities of daily living, including toileting. This deficit can be  resolved by using a bedside commode. Patient demonstrates mobility limitations that will cause them to be confined to one room at home without bathroom access for up to 30 days. Using a bedside commode will greatly improve the patient's ability to participate in MRADLs.

## 2024-12-29 NOTE — NURSING
-AAOx4, VSS, afebrile, on room air  -Pt worked with PT/OT; ambulated in room and up to chair  -PO potassium given, see MAR  -400ml yellow urine output  -Central line dressing change and CHG bath completed  -No complaints of pain or discomfort at this time  -No falls reported this shift  -Plan to discharge to rehab tomorrow  -Bed low and locked with call light in reach

## 2024-12-29 NOTE — ASSESSMENT & PLAN NOTE
Baseline creatinine is  1.5 . Most recent creatinine and eGFR are listed below.    Recent Labs     24  0901 24  0802   CREATININE 2.5* 2.2*   EGFRNORACEVR 26.8* 31.2*        Plan  - JAE is worsening. Will continue current treatment  - Avoid nephrotoxins and renally dose meds for GFR listed above  - Monitor urine output, serial BMP, and adjust therapy as needed    - Etiology includes volume overload, obstruction, hypotension, possible HRS  Baseline creatinine is 1.5. May require dialysis long term.   Patient tolerated intermittent HD well ()    IR placed RIJ TDC .     HD chair arranged. Delayed with holiday scheduled.   Discharge postponed as insurance auth . Can likely discharge

## 2024-12-29 NOTE — ASSESSMENT & PLAN NOTE
Patient has persistent (7 days or more) atrial fibrillation. Patient is currently in atrial fibrillation. [id number] Score: 1. The patients heart rate in the last 24 hours is as follows:  Pulse  Min: 81  Max: 89       Plan  - Patient's afib is currently controlled  Holding home Eliquis in the setting of recent low blood counts, consider continue holding on DC. Will discuss with patient regarding risks and benefits  Continue metoprolol 6.25 BID

## 2024-12-29 NOTE — PROGRESS NOTES
[facility] - Transplant [facility] Medicine  Progress Note    Patient Name: Juan Carlos Yoo Sr.  MRN: [id number]  Patient Class: IP- Inpatient   Admission Date: 11/20/2024  Length of Stay: 39 days  Attending Physician: Giovani Wheat MD  Primary Care Provider: Nyla Rios FNP        Subjective     Principal Problem:Decompensated cirrhosis        HPI:  69M with PMH of alcoholic cirrhosis, esophageal varices, Afib on eliquis, and HTN who presents for c/o worsening diffuse swelling as well as R arm pain/erythema. He was recently hospitalized 1 month ago at [facility] for volume overload in the setting of decompensated cirrhosis. He was treated with IV diuresis and discharged with adjustment to his diuretics, currently on lasix 60mg BID and metolazone 2.5mg every other week as per family. Patient reports diffuse swelling of his upper and lower extremities in addition to distended abdomen and worsening dyspnea, which he believes is due to recent medication adjustment. Family states he is non-compliant with low sodium diet and fluid restriction. Family states he has been compliant with diuretics, but rom't taken eliquis for 3 days due to issue getting refill. He also reports scratching right arm on door frame 3-4 days ago which has become red and painful after wrapping in in bandage. He claims to be compliant with medications, but is a poor historian. He follows with hepatology, not currently active on transplant list. He states he hasn't consumed alcohol for at least 9 months. Has c/o chills, but denies fever, cough, nausea, vomiting, chest pain, dysuria, hematuria, blood in stool. Workup in ED remarkable for VS: T 97.6 HR 94 RR 22 BP 95/56 O2 sat 97% on RA. Hb 6.7, MCV 75, Plt 81, PT/INR 22.6/2.2, Na 128, K 6.1, BUN/Cr 49/5.7, Alb 2.8, AST/ALT 35/9, Ammonia 104, , Trop neg, LA 2.9, CXR: Cardiac size is enlarged similar to prior.  No large volume of pleural fluid noted although there is mild blunting of  the right costophrenic angle which may relate to a small amount of pleural fluid.  Suspected right basilar opacity, to be correlated clinically for infection. RUE venous doppler: No thrombus in central veins of the right upper extremity. Patient received vanc/zosyn and lasix 80mg IVP in ED and will be admitted to hospital medicine service for further management.    Overview/Hospital Course:  69 y.o. male with history of EtOH use disorder, EtOH cirrhosis, HCC s/p y90, morbid obesity BMI 44, HTN, and Afib who presents with severe anasarca and JAE.      Appreciate hepatology and nephrology recommendations.  Diuretics were held because of concern for HRS.  Patient was started on albumin and midodrine per Nephrology recommendations for HRS.  Renal function continued to worsen and recommendation per Nephrology to transfer to ICU for maintaining goal map over 85 on Levophed.  ICU was notified and patient to be assessed to be transferred to ICU.     Patient also has Gram-positive cocci and Gram-negative rods in his blood now.  Possible sources could be got translocation and barrier related infection through skin as he has been significantly edematous and has been oozing.  Has been on vancomycin and Rocephin.  Id was consulted this morning.  Repeat blood cultures were obtained.     Continues to have anemia.  Hemoglobin dropped on 11/20 and was given 1 unit of packed red blood cells.  Did not respond appropriately.  Hemoglobin dropped again to 6.8 on 11/21 and was given 1 unit of packed red blood cells.  Hemoglobin again on 11/22 is 7.2.  No reported melena or hematochezia.  Patient was on Eliquis for atrial fibrillation prior to admission which was held.  Given history of esophageal varices and portal hypertensive gastropathy whereas also could be component of slow bleeding, under production due to CKD and hemolysis while also with decompensated cirrhosis.  Started on Protonix IV b.i.d. and octreotide.     Also clarified  with hepatology.  Given patient's current infection we will await ID recommendations however will be challenging to consider transplant evaluation at this time.  Current concerns regarding transplant include higher BMI, frailty / debility.      Goal clarification with the patient and family.  Patient at this time wants to be full code and continue with Levophed in ICU.  They would consider discussion with palliative care in a day or so if things do not get better.      In MICU patient started on Levophed, however titration remained difficult given tachycardic response from the patient.      11/24 Trialysis and arterial lines placed overnight and currently on levo and norepi. SLED today.     11/25 Boo dc. Increased midodrine. Continue steroids until d/c pressors. Discussed with Nephrology about our concern for sustained use of pressors to achieve their MAP goals of 85. Will follow up tomorrow.      11/26 Platelets 48. Repeat 38 confirming thrombocytopenia. Concern for HIT. D/c heparin. Increased lactulose dosing. Bladder scan revealed urine in bladder. Boo placed. Off vaso. Continuing levo. Maintain MAP goals 80. PT/OT consulted.      11/27 MAP goal 75-80. Heparin antibody result pending.      11/28 Pt with abdominal pain, decreased bowel movements, emesis. Lactic acid 2.9 and repeat 2.7. Less concerned for mesenteric ischemia and more of a concern was for SBO. NG tube placed with subsequent suction of 500mL fluid. Abdomen less distended after placement and pt subsequently had bowel movement. MAP Goal of 60 with plan to come off pressors entirely and switch to dialysis after permacath, as he is not  liver transplant eligible at this time.      11/29 Pt with ileus. Clear liquids for now. Platelets 24. HIT versus zosyn induced? Peripheral smear sent. Zosyn changed to kaylah. Consent and transfuse 1U. GOC conversation today. Pt opting for long term dialysis.      11/30 naeon. Started back on heparin. One time dose of  120mg lasix today. Hold off SLED/SCUF today and give IV lasix 120 mg once; plan for SLED/SCUF tomorrow      12/1 Patient is becoming more dependent on dialysis. Not a current liver transplant candidate. Still complaining of abdominal pain after discontinuing the NGT. AXR with evidence of dilated bowel loops. Brown bomb administered. The days following administration of the brown bomb, patient had more frequent bowel movements and was able to pass flatulence. Constipation eventually resolved.   Given the need for pressor support, he was worked up for adrenal insufficiency which was positive. Consequently, he was started on steroids for adrenal insufficiency. His blood pressures improved, however, he still required pressor support, particularly during dialysis. Pressor support was eventually weaned off. Patient was started on midodrine to help with BP. Nephrology trialed intermittent HD and patient seemed to tolerate it well. Nephrology recommended placement of tunneled catheter for HD. Patient was medically stable for step down to the hospital medicine floors.     Patient underwent testing with cosyntropin stimulation test, results are not compatible with adrenal insufficiency, cont hydrocortisone taper. Patient tolerating HD. IR placed RIJ TDC 12/16. Discharge planning to SNF.    12/19- patient was walking to the bathroom and had a mechanical fall with a his foot stumbling and hit his face of the ledge in the bathroom.  Although he walked with a walker and had nursing staff to help however could not be controlled.  Small contusion of the right glabella and the nose bridge.  CT head without any acute fractures.  Also noting abdominal distention.  We will request a paracentesis.  CT abdomen without any concern for hematoma.    12/20- accepted for nursing facility however awaiting for hemodialysis chair.  Given this is a new  admission possible anticipated discharge on Monday per SNF  and requesting attempts to see if  systolics can improve > 110.  we will lower his Lopressor if possible.  We will hold his Flomax.     - SNF admission on Monday anticipated.     - Blood pressures have been borderline. HD likely tomorrow.     - Cellulitis of R sided abdomen wall, edges marked. No systemic signs. Continued on rocephin. Chlorhexidine bath ordered.     - Cellulitis appears better, continue rocephin, HD completed today.     - HD yesterday, anticipating next session on Friday and so he can get his next HD on Monday. Abdominal cellulitis looks better.     Discharge postponed as insurance auth . Planning for discharge .     Interval History: NAEON. Tolerating HD. No acute issues. Discharge postponed as insurance auth . Planning for discharge .    Review of Systems   Constitutional:  Negative for chills and fever.   HENT:  Negative for nosebleeds.    Respiratory:  Negative for cough.    Cardiovascular:  Positive for leg swelling.   Gastrointestinal:  Positive for abdominal distention. Negative for abdominal pain and diarrhea.   Genitourinary:  Positive for scrotal swelling.   Skin:  Positive for pallor and wound.   Neurological:  Negative for dizziness and light-headedness.   Psychiatric/Behavioral:  Negative for confusion.    All other systems reviewed and are negative.    Objective:     Vital Signs (Most Recent):  Temp: 97.9 °F (36.6 °C) (24 1529)  Pulse: 82 (24 1529)  Resp: 14 (24 1529)  BP: (!) 116/59 (24 1529)  SpO2: 97 % (24 1529) Vital Signs (24h Range):  Temp:  [97.9 °F (36.6 °C)-98.4 °F (36.9 °C)] 97.9 °F (36.6 °C)  Pulse:  [81-89] 82  Resp:  [14-18] 14  SpO2:  [97 %-100 %] 97 %  BP: ()/(45-67) 116/59   Weight: (!) 140.2 kg (309 lb)  Body mass index is 40.77 kg/m².      Intake/Output Summary (Last 24 hours) at 2024 7158  Last data filed at 2024 1146  Gross per 24 hour   Intake 480 ml   Output 700 ml   Net -220 ml          Physical  Exam  Vitals and nursing note reviewed.   Constitutional:       General: He is awake. He is not in acute distress.     Appearance: He is obese. He is ill-appearing.   HENT:      Head:      Comments: Contusion and small bump around right glabella.      Mouth/Throat:      Comments: Lower lip injury mostly healed  Eyes:      Extraocular Movements: Extraocular movements intact.      Conjunctiva/sclera: Conjunctivae normal.   Cardiovascular:      Rate and Rhythm: Normal rate.   Pulmonary:      Effort: Pulmonary effort is normal. No respiratory distress.   Abdominal:      General: There is distension.      Palpations: Abdomen is soft.      Tenderness: There is no abdominal tenderness.   Musculoskeletal:         General: No swelling or tenderness.      Right lower leg: Edema present.      Left lower leg: Edema present.      Comments: 2+ pitting edema in bilateral lower extremities   Skin:     General: Skin is warm.      Coloration: Skin is not jaundiced.      Findings: Bruising present.   Neurological:      Mental Status: He is alert and oriented to person, place, and time.      Motor: No weakness.   Psychiatric:         Behavior: Behavior normal.            Significant Labs:    CBC/Anemia Profile:  Recent Labs   Lab 12/28/24  1749 12/29/24  1108   WBC 7.95 7.09   HGB 7.1* 7.9*   HCT 22.4* 24.0*   PLT 64* 75*   MCV 87 86   RDW 27.3* 27.5*          Chemistries:  Recent Labs   Lab 12/29/24  0802 12/29/24  1108   *  --    K 3.0*  --      --    CO2 21*  --    BUN 38*  --    CREATININE 2.2*  --    CALCIUM 8.2*  --    ALBUMIN 2.0*  --    PROT 5.3*  --    BILITOT 2.2*  --    ALKPHOS 75  --    ALT 14  --    AST 26  --    MG  --  1.9   PHOS 3.2  --        All pertinent labs within the past 24 hours have been reviewed.    Significant Imaging:  I have reviewed all pertinent imaging results/findings within the past 24 hours.    Assessment and Plan     * Decompensated cirrhosis  Etoh Cirrhosis  Hx of HCC s/p Y90    MELD  3.0: 29 at 12/25/2024  6:24 AM  MELD-Na: 30 at 12/25/2024  6:24 AM  Calculated from:  Serum Creatinine: On dialysis. Using the maximum value.  Serum Sodium: 132 mmol/L at 12/25/2024  6:24 AM  Total Bilirubin: 3 mg/dL at 12/25/2024  6:24 AM  Serum Albumin: 2.1 g/dL at 12/25/2024  6:24 AM  INR(ratio): 1.5 at 12/23/2024  7:15 AM  Age at listing (hypothetical): 69 years  Sex: Male at 12/25/2024  6:24 AM    Cont lactulose    H/O HCC: treated with Y90 in June 2023.Last MRI abd w/o evidence of residual or recurrent disease 3/24/24. Due for repeat surveillance MRI as an outpatient.    Appreciate hepatology recommendations.   Current concerns regarding transplant include higher BMI, frailty / debility.       Discharge planning to SNF        Cellulitis  Ceftriaxone  Improving  Plan to complete 10 day course  Miconazole for candidal intertrigo      Morbid obesity  Body mass index is 40.77 kg/m². Morbid obesity complicates all aspects of disease management from diagnostic modalities to treatment. Weight loss encouraged and health benefits explained to patient.         Adrenal insufficiency  On going problem since admission, most likely multifactorial - component of liver dysfunction, HRS, sepsis on admission.  S/p pressors in ICU     Random cortisol was 6   Persistent hypotension thought to be adrenal insufficiency. Patient underwent testing with cosyntropin stimulation test, results are not compatible with adrenal insufficiency. appreciate endocrinology recommendations   Completed hydrocortisone taper      Moderate protein-calorie malnutrition  Nutrition consulted. Most recent weight and BMI monitored-     Measurements:  Wt Readings from Last 1 Encounters:   12/26/24 (!) 140.2 kg (309 lb)   Body mass index is 40.77 kg/m².    Patient has been screened and assessed by RD.    Malnutrition Type:  Context: acute illness or injury  Level: moderate    Malnutrition Characteristic Summary:  Energy Intake (Malnutrition): less than 75%  for greater than 7 days  Subcutaneous Fat (Malnutrition): mild depletion  Muscle Mass (Malnutrition): mild depletion  Fluid Accumulation (Malnutrition): moderate to severe    Interventions/Recommendations (treatment strategy):  1.)      Hypotension        Edema  Volume mgmt with RRT      Debility  Patient with Acute on chronic debility due to chronic unspecified fatigue, age-related physical debility, and other reduced mobility. The patient's latest AMPAC (Activity Measure for Post Acute Care) Score is listed below.    AM-PAC Score - How much help does the patient need for each activity listed  Basic Mobility Total Score: 17  Turning over in bed (including adjusting bedclothes, sheets and blankets)?: A little  Sitting down on and standing up from a chair with arms (e.g., wheelchair, bedside commode, etc.): A little  Moving from lying on back to sitting on the side of the bed?: A little  Moving to and from a bed to a chair (including a wheelchair)?: A little  Need to walk in hospital room?: A little  Climbing 3-5 steps with a railing?: A lot    Plan  - Progressive mobility protocol initated  - PT/OT consulted  - Fall precautions in place  - PT/OT recommending LI therapy. Planning for SNF      Advanced care planning/counseling discussion        Palliative care encounter  Goals of care, counseling/discussion  Advance Care Planning  Patient requests FULL CODE status and would like to continue current treatment      Abdominal pain        Gram-negative bacteremia  Blood cultures from admission with B. Diminuta, micrococcus luteus, lysinobacillus species. Seen by ID previously who recommended stopping antibiotics due to concern these were contaminants. Repeat blood cultures have been no growth. Has since been transferred to ICU with increased pressor requirement. Blood cultures repeated on 11/24 are no growth x 24 hours.   11/29 Switched from zosyn to meropenem due to concern of thrombocytopenia.   Finished meropenem  course 12/8     Encephalopathy, metabolic  2/2 decompensated cirrhosis and JAE  Resolved    Hyponatremia  Hyponatremia is likely due to Cirrhosis. The patient's most recent sodium results are listed below.  Recent Labs     12/27/24  0901 12/29/24  0802   * 131*       Plan  - Correct the sodium by 4-6mEq in 24 hours.   - Appreciate nephrology recommendations  - Monitor sodium daily  - Patient hyponatremia is stable  - Mgmt with HD    Thrombocytopenia  The likely etiology of thrombocytopenia is liver disease. The patients 3 most recent labs are listed below.  Recent Labs     12/28/24  1749 12/29/24  1108   PLT 64* 75*       Plan  - Will transfuse if platelet count is <50k (if undergoing surgical procedure or have active bleeding).      Microcytic anemia  Anemia is likely due to chronic disease due to Chronic liver disease. Most recent hemoglobin and hematocrit are listed below.  Recent Labs     12/28/24  1749 12/29/24  1108   HGB 7.1* 7.9*   HCT 22.4* 24.0*       Plan  - Monitor serial CBC: Daily  - Transfuse PRBC if patient becomes hemodynamically unstable, symptomatic or H/H drops below 7/21.  - Patient has received 1 units of PRBCs on 11/20, 11/21, 12/15  - Patient's anemia is currently stable  - Hb baseline 7-8. No obvious bleeding  - Eliquis held on admission. DVT prophylaxis held.     Stage 3a chronic kidney disease  ESRD on HD  Nephrology following  Will try to remove more fluid  Strict intake and output  Renally dose all medications  Avoid nephrotoxic agents  IR placed RIJ TDC 12/16  Outpatient HD arranged      JAE (acute kidney injury)  Baseline creatinine is  1.5 . Most recent creatinine and eGFR are listed below.    Recent Labs     12/27/24  0901 12/29/24  0802   CREATININE 2.5* 2.2*   EGFRNORACEVR 26.8* 31.2*        Plan  - JAE is worsening. Will continue current treatment  - Avoid nephrotoxins and renally dose meds for GFR listed above  - Monitor urine output, serial BMP, and adjust therapy as  needed    - Etiology includes volume overload, obstruction, hypotension, possible HRS  Baseline creatinine is 1.5. May require dialysis long term.   Patient tolerated intermittent HD well ()    IR placed RIJ TDC .     HD chair arranged. Delayed with holiday scheduled.   Discharge postponed as insurance auth . Can likely discharge     Atrial fibrillation  Patient has persistent (7 days or more) atrial fibrillation. Patient is currently in atrial fibrillation. [id number] Score: 1. The patients heart rate in the last 24 hours is as follows:  Pulse  Min: 81  Max: 89       Plan  - Patient's afib is currently controlled  Holding home Eliquis in the setting of recent low blood counts, consider continue holding on DC. Will discuss with patient regarding risks and benefits  Continue metoprolol 6.25 BID     Coagulopathy  2/2 cirrhosis. See plan below    Received 3 doses of IV vitamin K.  End Stage Liver Disease precipitated coagulopathy  Heparin d/c d/t thrombocytopenia  Cont to hold eliquis on discharge          Goals of care, counseling/discussion  Advance Care Planning    Code Status  In light of the patients advanced and life limiting illness,I engaged the the patient and family in a voluntary conversation about the patient's preferences for care  at the very end of life. The patient wishes to have a natural, peaceful death.  Along those lines, the patient wishes to have CPR or other invasive treatments performed when his heart and/or breathing stops. I communicated to the patient and family. Continue FULL CODE.    A total of 15 min was spent on advance care planning, goals of care discussion, emotional support, formulating and communicating prognosis and exploring burden/benefit of various approaches of treatment. This discussion occurred on a fully voluntary basis with the verbal consent of the patient and/or family.           VTE Risk Mitigation (From admission, onward)           Ordered      heparin (porcine) injection 1,000 Units  As needed (PRN)         12/24/24 0945     Place sequential compression device  Until discontinued         11/22/24 1329     IP VTE HIGH RISK PATIENT  Once         11/20/24 1622                    Discharge Planning   AUTUMN: 12/30/2024     Code Status: Full Code   Medical Readiness for Discharge Date: 12/27/2024  Discharge Plan A: Skilled Nursing Facility   Discharge Delays: (!) Ambulance Transport/Facility Transport            Please place Justification for DME        Giovani Wheat MD  Department of Hospital Medicine   [facility] - Transplant Stepdown

## 2024-12-29 NOTE — ASSESSMENT & PLAN NOTE
Hyponatremia is likely due to Cirrhosis. The patient's most recent sodium results are listed below.  Recent Labs     12/27/24  0901 12/29/24  0802   * 131*       Plan  - Correct the sodium by 4-6mEq in 24 hours.   - Appreciate nephrology recommendations  - Monitor sodium daily  - Patient hyponatremia is stable  - Mgmt with HD

## 2024-12-29 NOTE — ASSESSMENT & PLAN NOTE
Goals of care, counseling/discussion  Advance Care Planning  Patient requests FULL CODE status and would like to continue current treatment     No Adequate: hears normal conversation without difficulty

## 2024-12-29 NOTE — PT/OT/SLP PROGRESS
"Physical Therapy Treatment    Patient Name:  Juan Carlos Yoo Sr.   MRN:  [id number]    Recommendations:     Discharge Recommendations: Moderate Intensity Therapy  Discharge Equipment Recommendations: bedside commode, bath bench, grab bar, walker, rolling, wheelchair  Barriers to discharge: Evolving clinical presentation    Assessment:     Juan Carlos Yoo Sr. is a 69 y.o. male admitted with a medical diagnosis of Decompensated cirrhosis.  He presents with the following impairments/functional limitations: weakness, impaired endurance, impaired self care skills, impaired functional mobility, gait instability, impaired balance, decreased upper extremity function, decreased lower extremity function, decreased safety awareness requiring mod assistance and verbal cues for bed mob and CGA for sit < > stand transitions and gait to prevent falls due to weakness, fatigue.   In light of pt's current functional level and deficits, it is anticipated that pt will need to participate in a moderate intensity rehab program consisting of PT and OT in order to achieve full rehab potential to return to previous level of function and roles.  Pt remains motivated to participate in PT session and will cont to benefit from skilled PT intervention..    Rehab Prognosis: Good; patient would benefit from acute skilled PT services to address these deficits and reach maximum level of function.    Recent Surgery: * No surgery found *      Plan:     During this hospitalization, patient to be seen 4 x/week to address the identified rehab impairments via gait training, therapeutic activities, therapeutic exercises, neuromuscular re-education and progress toward the following goals:    Plan of Care Expires:  12/27/24    Subjective     Chief Complaint: fatigue "I'm ready to go back to bed"  Pain/Comfort:  Pain Rating 1: 0/10  Pain Rating Post-Intervention 1: 0/10      Objective:     Communicated with nurse (Andrea) prior to session.  Patient found up in chair " with Trialysis (No family present) upon PT entry to room.     General Precautions: Standard, fall  Orthopedic Precautions: N/A  Braces: N/A  Respiratory Status: Room air     Functional Mobility:  Bed Mobility:     Sit to Supine: mod A for B LE's, HOB flat  Transfers:     Sit to Stand:  from BS chair with CGA with rolling walker  Gait: RW CGA for balance and safety 45ft within room, including turning to the R and to the L      AM-PAC 6 CLICK MOBILITY  Turning over in bed (including adjusting bedclothes, sheets and blankets)?: 3  Sitting down on and standing up from a chair with arms (e.g., wheelchair, bedside commode, etc.): 3  Moving from lying on back to sitting on the side of the bed?: 3  Moving to and from a bed to a chair (including a wheelchair)?: 3  Need to walk in hospital room?: 3  Climbing 3-5 steps with a railing?: 2  Basic Mobility Total Score: 17       Treatment & Education:  Patient provided with daily orientation and goals of this PT session. They were educated to call for assistance and to transfer with hospital staff only.  Also, pt was educated on the effects of prolonged immobility and the importance of performing OOB activity and exercises to promote healing and reduce recovery time    Patient left HOB elevated with call button in reach and nurse notified..    GOALS:   Multidisciplinary Problems       Physical Therapy Goals          Problem: Physical Therapy    Goal Priority Disciplines Outcome Interventions   Physical Therapy Goal     PT, PT/OT Progressing    Description: Goals to be met by: 24     Patient will increase functional independence with mobility by performin. Supine to sit with Minimal Assistance  2. Sit to stand transfer with Stand By Assistance  3. Bed to chair transfer with Stand By Assistance using LRAD  4. Gait  x 150 feet with Stand By Assistance using No LRAD.   5. Pt sit on EOB x 15 min with SBA and perform functional activities to increased functional mobility.                           Time Tracking:     PT Received On: 12/29/24  PT Start Time: 1344     PT Stop Time: 1359  PT Total Time (min): 15 min     Billable Minutes: Gait Training 15    Treatment Type: Treatment  PT/PTA: PTA     Number of PTA visits since last PT visit: 3     12/29/2024

## 2024-12-29 NOTE — ASSESSMENT & PLAN NOTE
The likely etiology of thrombocytopenia is liver disease. The patients 3 most recent labs are listed below.  Recent Labs     12/28/24  1749 12/29/24  1108   PLT 64* 75*       Plan  - Will transfuse if platelet count is <50k (if undergoing surgical procedure or have active bleeding).

## 2024-12-29 NOTE — PLAN OF CARE
Pt AAOx4. Afebrile. VSS. No report of pain this shift. 3 BM overnight. No report of injuries or falls this shift. Bed in the lowest setting, call light within reach.

## 2024-12-29 NOTE — ASSESSMENT & PLAN NOTE
Anemia is likely due to chronic disease due to Chronic liver disease. Most recent hemoglobin and hematocrit are listed below.  Recent Labs     12/28/24  1749 12/29/24  1108   HGB 7.1* 7.9*   HCT 22.4* 24.0*       Plan  - Monitor serial CBC: Daily  - Transfuse PRBC if patient becomes hemodynamically unstable, symptomatic or H/H drops below 7/21.  - Patient has received 1 units of PRBCs on 11/20, 11/21, 12/15  - Patient's anemia is currently stable  - Hb baseline 7-8. No obvious bleeding  - Eliquis held on admission. DVT prophylaxis held.

## 2024-12-29 NOTE — SUBJECTIVE & OBJECTIVE
Interval History: NAEON. Tolerating HD. No acute issues. Discharge postponed as insurance auth . Planning for discharge .    Review of Systems   Constitutional:  Negative for chills and fever.   HENT:  Negative for nosebleeds.    Respiratory:  Negative for cough.    Cardiovascular:  Positive for leg swelling.   Gastrointestinal:  Positive for abdominal distention. Negative for abdominal pain and diarrhea.   Genitourinary:  Positive for scrotal swelling.   Skin:  Positive for pallor and wound.   Neurological:  Negative for dizziness and light-headedness.   Psychiatric/Behavioral:  Negative for confusion.    All other systems reviewed and are negative.    Objective:     Vital Signs (Most Recent):  Temp: 97.9 °F (36.6 °C) (24 1529)  Pulse: 82 (24 1529)  Resp: 14 (24 152)  BP: (!) 116/59 (24 152)  SpO2: 97 % (24 152) Vital Signs (24h Range):  Temp:  [97.9 °F (36.6 °C)-98.4 °F (36.9 °C)] 97.9 °F (36.6 °C)  Pulse:  [81-89] 82  Resp:  [14-18] 14  SpO2:  [97 %-100 %] 97 %  BP: ()/(45-67) 116/59   Weight: (!) 140.2 kg (309 lb)  Body mass index is 40.77 kg/m².      Intake/Output Summary (Last 24 hours) at 2024 1638  Last data filed at 2024 1146  Gross per 24 hour   Intake 480 ml   Output 700 ml   Net -220 ml          Physical Exam  Vitals and nursing note reviewed.   Constitutional:       General: He is awake. He is not in acute distress.     Appearance: He is obese. He is ill-appearing.   HENT:      Head:      Comments: Contusion and small bump around right glabella.      Mouth/Throat:      Comments: Lower lip injury mostly healed  Eyes:      Extraocular Movements: Extraocular movements intact.      Conjunctiva/sclera: Conjunctivae normal.   Cardiovascular:      Rate and Rhythm: Normal rate.   Pulmonary:      Effort: Pulmonary effort is normal. No respiratory distress.   Abdominal:      General: There is distension.      Palpations: Abdomen is soft.       Tenderness: There is no abdominal tenderness.   Musculoskeletal:         General: No swelling or tenderness.      Right lower leg: Edema present.      Left lower leg: Edema present.      Comments: 2+ pitting edema in bilateral lower extremities   Skin:     General: Skin is warm.      Coloration: Skin is not jaundiced.      Findings: Bruising present.   Neurological:      Mental Status: He is alert and oriented to person, place, and time.      Motor: No weakness.   Psychiatric:         Behavior: Behavior normal.            Significant Labs:    CBC/Anemia Profile:  Recent Labs   Lab 12/28/24  1749 12/29/24  1108   WBC 7.95 7.09   HGB 7.1* 7.9*   HCT 22.4* 24.0*   PLT 64* 75*   MCV 87 86   RDW 27.3* 27.5*          Chemistries:  Recent Labs   Lab 12/29/24  0802 12/29/24  1108   *  --    K 3.0*  --      --    CO2 21*  --    BUN 38*  --    CREATININE 2.2*  --    CALCIUM 8.2*  --    ALBUMIN 2.0*  --    PROT 5.3*  --    BILITOT 2.2*  --    ALKPHOS 75  --    ALT 14  --    AST 26  --    MG  --  1.9   PHOS 3.2  --        All pertinent labs within the past 24 hours have been reviewed.    Significant Imaging:  I have reviewed all pertinent imaging results/findings within the past 24 hours.

## 2024-12-29 NOTE — PLAN OF CARE
Problem: Fall Injury Risk  Goal: Absence of Fall and Fall-Related Injury  Outcome: Progressing     Problem: Liver Failure  Goal: Optimal Coping with Liver Failure  Outcome: Progressing  Goal: Optimal Gastrointestinal Function  Outcome: Progressing  Goal: Absence of Infection Signs and Symptoms  Outcome: Progressing  Goal: Improved Oral Intake  Outcome: Progressing  Goal: Optimal Pain Control, Comfort and Function  Outcome: Progressing  Goal: Optimize Renal Function  Outcome: Progressing  Goal: Effective Oxygenation and Ventilation  Outcome: Progressing

## 2024-12-29 NOTE — ASSESSMENT & PLAN NOTE
ESRD on HD  Nephrology following  Will try to remove more fluid  Strict intake and output  Renally dose all medications  Avoid nephrotoxic agents  IR placed RIMOSHE TDC 12/16  Outpatient HD arranged

## 2024-12-30 VITALS
OXYGEN SATURATION: 100 % | WEIGHT: 309 LBS | HEIGHT: 73 IN | TEMPERATURE: 98 F | RESPIRATION RATE: 18 BRPM | DIASTOLIC BLOOD PRESSURE: 52 MMHG | HEART RATE: 85 BPM | BODY MASS INDEX: 40.95 KG/M2 | SYSTOLIC BLOOD PRESSURE: 105 MMHG

## 2024-12-30 LAB
ALBUMIN SERPL BCP-MCNC: 2.1 G/DL (ref 3.5–5.2)
ALP SERPL-CCNC: 83 U/L (ref 40–150)
ALT SERPL W/O P-5'-P-CCNC: 16 U/L (ref 10–44)
ANION GAP SERPL CALC-SCNC: 7 MMOL/L (ref 8–16)
AST SERPL-CCNC: 30 U/L (ref 10–40)
BASOPHILS # BLD AUTO: 0.06 K/UL (ref 0–0.2)
BASOPHILS NFR BLD: 0.8 % (ref 0–1.9)
BILIRUB SERPL-MCNC: 2.2 MG/DL (ref 0.1–1)
BUN SERPL-MCNC: 37 MG/DL (ref 8–23)
CALCIUM SERPL-MCNC: 8.4 MG/DL (ref 8.7–10.5)
CHLORIDE SERPL-SCNC: 102 MMOL/L (ref 95–110)
CO2 SERPL-SCNC: 22 MMOL/L (ref 23–29)
CREAT SERPL-MCNC: 2.5 MG/DL (ref 0.5–1.4)
DIFFERENTIAL METHOD BLD: ABNORMAL
EOSINOPHIL # BLD AUTO: 0.5 K/UL (ref 0–0.5)
EOSINOPHIL NFR BLD: 6.6 % (ref 0–8)
ERYTHROCYTE [DISTWIDTH] IN BLOOD BY AUTOMATED COUNT: 27.4 % (ref 11.5–14.5)
EST. GFR  (NO RACE VARIABLE): 26.8 ML/MIN/1.73 M^2
GLUCOSE SERPL-MCNC: 98 MG/DL (ref 70–110)
HCT VFR BLD AUTO: 23.4 % (ref 40–54)
HGB BLD-MCNC: 7.4 G/DL (ref 14–18)
IMM GRANULOCYTES # BLD AUTO: 0.05 K/UL (ref 0–0.04)
IMM GRANULOCYTES NFR BLD AUTO: 0.7 % (ref 0–0.5)
INR PPP: 1.5 (ref 0.8–1.2)
LYMPHOCYTES # BLD AUTO: 0.7 K/UL (ref 1–4.8)
LYMPHOCYTES NFR BLD: 9.6 % (ref 18–48)
MCH RBC QN AUTO: 27.8 PG (ref 27–31)
MCHC RBC AUTO-ENTMCNC: 31.6 G/DL (ref 32–36)
MCV RBC AUTO: 88 FL (ref 82–98)
MONOCYTES # BLD AUTO: 1.3 K/UL (ref 0.3–1)
MONOCYTES NFR BLD: 18 % (ref 4–15)
NEUTROPHILS # BLD AUTO: 4.8 K/UL (ref 1.8–7.7)
NEUTROPHILS NFR BLD: 64.3 % (ref 38–73)
NRBC BLD-RTO: 0 /100 WBC
PLATELET # BLD AUTO: 78 K/UL (ref 150–450)
PMV BLD AUTO: ABNORMAL FL (ref 9.2–12.9)
POTASSIUM SERPL-SCNC: 3.4 MMOL/L (ref 3.5–5.1)
PROT SERPL-MCNC: 6 G/DL (ref 6–8.4)
PROTHROMBIN TIME: 15.6 SEC (ref 9–12.5)
RBC # BLD AUTO: 2.66 M/UL (ref 4.6–6.2)
SODIUM SERPL-SCNC: 131 MMOL/L (ref 136–145)
WBC # BLD AUTO: 7.43 K/UL (ref 3.9–12.7)

## 2024-12-30 PROCEDURE — 85025 COMPLETE CBC W/AUTO DIFF WBC: CPT | Performed by: INTERNAL MEDICINE

## 2024-12-30 PROCEDURE — 25000003 PHARM REV CODE 250

## 2024-12-30 PROCEDURE — 63600175 PHARM REV CODE 636 W HCPCS: Performed by: STUDENT IN AN ORGANIZED HEALTH CARE EDUCATION/TRAINING PROGRAM

## 2024-12-30 PROCEDURE — 85610 PROTHROMBIN TIME: CPT | Performed by: INTERNAL MEDICINE

## 2024-12-30 PROCEDURE — 80100016 HC MAINTENANCE HEMODIALYSIS

## 2024-12-30 PROCEDURE — 36415 COLL VENOUS BLD VENIPUNCTURE: CPT | Performed by: INTERNAL MEDICINE

## 2024-12-30 PROCEDURE — 63600175 PHARM REV CODE 636 W HCPCS

## 2024-12-30 PROCEDURE — 25000003 PHARM REV CODE 250: Performed by: STUDENT IN AN ORGANIZED HEALTH CARE EDUCATION/TRAINING PROGRAM

## 2024-12-30 PROCEDURE — 80053 COMPREHEN METABOLIC PANEL: CPT | Performed by: INTERNAL MEDICINE

## 2024-12-30 RX ORDER — SODIUM CHLORIDE 9 MG/ML
INJECTION, SOLUTION INTRAVENOUS ONCE
Status: COMPLETED | OUTPATIENT
Start: 2024-12-30 | End: 2024-12-30

## 2024-12-30 RX ORDER — CEFADROXIL 500 MG/1
1000 CAPSULE ORAL ONCE
Start: 2024-12-30 | End: 2024-12-30

## 2024-12-30 RX ORDER — CEFTRIAXONE 1 G/1
1 INJECTION, POWDER, FOR SOLUTION INTRAMUSCULAR; INTRAVENOUS
Status: DISCONTINUED | OUTPATIENT
Start: 2024-12-30 | End: 2024-12-31 | Stop reason: HOSPADM

## 2024-12-30 RX ADMIN — HEPARIN SODIUM 1000 UNITS: 1000 INJECTION, SOLUTION INTRAVENOUS; SUBCUTANEOUS at 03:12

## 2024-12-30 RX ADMIN — METOPROLOL TARTRATE 6.25 MG: 25 TABLET, FILM COATED ORAL at 08:12

## 2024-12-30 RX ADMIN — FAMOTIDINE 20 MG: 20 TABLET ORAL at 09:12

## 2024-12-30 RX ADMIN — MICONAZOLE NITRATE 2 % TOPICAL POWDER: at 09:12

## 2024-12-30 RX ADMIN — DIPHENHYDRAMINE HYDROCHLORIDE 25 MG: 25 CAPSULE ORAL at 03:12

## 2024-12-30 RX ADMIN — METOPROLOL TARTRATE 6.25 MG: 25 TABLET, FILM COATED ORAL at 09:12

## 2024-12-30 RX ADMIN — LACTULOSE 30 G: 20 SOLUTION ORAL at 09:12

## 2024-12-30 RX ADMIN — MIDODRINE HYDROCHLORIDE 15 MG: 5 TABLET ORAL at 08:12

## 2024-12-30 RX ADMIN — MIDODRINE HYDROCHLORIDE 15 MG: 5 TABLET ORAL at 11:12

## 2024-12-30 RX ADMIN — MIDODRINE HYDROCHLORIDE 15 MG: 5 TABLET ORAL at 05:12

## 2024-12-30 RX ADMIN — CEFTRIAXONE 1 G: 1 INJECTION, POWDER, FOR SOLUTION INTRAMUSCULAR; INTRAVENOUS at 04:12

## 2024-12-30 RX ADMIN — MIDODRINE HYDROCHLORIDE 15 MG: 5 TABLET ORAL at 02:12

## 2024-12-30 RX ADMIN — DIPHENHYDRAMINE HYDROCHLORIDE 25 MG: 25 CAPSULE ORAL at 09:12

## 2024-12-30 RX ADMIN — SODIUM CHLORIDE: 9 INJECTION, SOLUTION INTRAVENOUS at 12:12

## 2024-12-30 NOTE — ASSESSMENT & PLAN NOTE
JAE is likely due to pre-renal azotemia due to intravascular volume depletion secondary to decompensated hepatic cirrhosis with volume overload and acute tubular necrosis caused by hemodynamic instability, renal hypoperfusion, severe sepsis with GNR bacteremia. Initial presentation concerning for hepatorenal syndrome, transferred to MICU for vasopressors without success in reaching MAP goal 85-90 for treatment of HRS. Currently, patient with oligouric JAE more contributed by ATN as above.     Recommendations:  - Will do HD today before discharge  -Strict I/Os  -Continue outpatient HD per the unit

## 2024-12-30 NOTE — PROGRESS NOTES
[facility] Step[facility]  Nephrology  Progress Note    Patient Name: Juan Carlos Yoo Sr.  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 Recent admission 10/4 at [facility] for decompensated hepatic cirrhosis where he was discharged with oral diuretic regimen including furosemide 60 mg BID and metolazone 2.5 mg daily, low salt diet with fluid restriction. It is unclear if patient is adherent to these medications or lifestyle modifications. W/u notable for anemia with hb 6.7 s/p 1 unit pRBC 11/20 and JAE on CKD w elevated BUN 49/creatinine 5.9, hyperkalemia (K 6.1>5.1 after shifting), bicarb 18, elevated lactate up to 3.5. Nephrology consulted for JAE with c/o HRS    Interval History: STEPAN, planing for HD today before discharge.    Review of patient's allergies indicates:  No Known Allergies  Current Facility-Administered Medications   Medication Frequency    albuterol-ipratropium 2.5 mg-0.5 mg/3 mL nebulizer solution 3 mL Q6H PRN    aluminum-magnesium hydroxide-simethicone 200-200-20 mg/5 mL suspension 30 mL QID PRN    cefTRIAXone injection 1 g Q24H    dextrose 10% bolus 125 mL 125 mL PRN    dextrose 10% bolus 250 mL 250 mL PRN    diphenhydrAMINE capsule 25 mg Q6H PRN    famotidine tablet 20 mg Every other day    glucagon (human recombinant) injection 1 mg PRN    glucose chewable tablet 16 g PRN    glucose chewable tablet 24 g PRN    heparin (porcine) injection 1,000 Units PRN    lactulose 20 gram/30 mL solution Soln  30 g TID    melatonin tablet 6 mg Nightly PRN    metoprolol 12.5mg/1.25mL oral solution 6.25 mg BID    miconazole NITRATE 2 % top powder BID    midodrine tablet 15 mg Q8H    midodrine tablet 20 mg Daily PRN    naloxone 0.4 mg/mL injection 0.02 mg PRN    ondansetron injection 4 mg Q8H PRN    polyethylene glycol packet 17 g BID PRN    simethicone chewable tablet 80 mg QID PRN    sodium chloride 0.9% bolus 250 mL 250 mL PRN    sodium chloride 0.9% flush 10 mL PRN       Objective:     Vital Signs (Most Recent):  Temp: 98.3 °F (36.8 °C) (12/30/24 1128)  Pulse: 80 (12/30/24 1128)  Resp: 18 (12/30/24 1128)  BP: (!) 111/57 (12/30/24 1128)  SpO2: 100 % (12/30/24 1128) Vital Signs (24h Range):  Temp:  [97.9 °F (36.6 °C)-98.3 °F (36.8 °C)] 98.3 °F (36.8 °C)  Pulse:  [80-89] 80  Resp:  [14-18] 18  SpO2:  [96 %-100 %] 100 %  BP: (106-128)/(55-61) 111/57     Weight: (!) 140.2 kg (309 lb) (12/26/24 0400)  Body mass index is 40.77 kg/m².  Body surface area is 2.69 meters squared.    I/O last 3 completed shifts:  In: 960 [P.O.:960]  Out: 1000 [Urine:1000]     Physical Exam  Pulmonary:      Effort: Pulmonary effort is normal. No respiratory distress.   Musculoskeletal:         General: Swelling present.      Right lower leg: Edema present.      Left lower leg: Edema present.   Skin:     General: Skin is warm.   Neurological:      Mental Status: He is alert and oriented to person, place, and time.          Significant Labs:  BMP:   Recent Labs   Lab 12/29/24  1108 12/30/24  0614   GLU  --  98   NA  --  131*   K  --  3.4*   CL  --  102   CO2  --  22*   BUN  --  37*   CREATININE  --  2.5*   CALCIUM  --  8.4*   MG 1.9  --      CBC:   Recent Labs   Lab 12/30/24  0614   WBC 7.43   RBC 2.66*   HGB 7.4*   HCT 23.4*   PLT 78*   MCV 88   MCH 27.8   MCHC 31.6*        Significant Imaging:  Labs: Reviewed  Assessment/Plan:     Renal/  JAE (acute kidney injury)  JAE is likely due to pre-renal azotemia due to intravascular volume depletion  secondary to decompensated hepatic cirrhosis with volume overload and acute tubular necrosis caused by hemodynamic instability, renal hypoperfusion, severe sepsis with GNR bacteremia. Initial presentation concerning for hepatorenal syndrome, transferred to MICU for vasopressors without success in reaching MAP goal 85-90 for treatment of HRS. Currently, patient with oligouric JAE more contributed by ATN as above.     Recommendations:  - Will do HD today before discharge  -Strict I/Os  -Continue outpatient HD per the unit             Thank you for your consult. I will follow-up with patient. Please contact us if you have any additional questions.    Betty Terrazas MD  Nephrology  [facility] - [facility]

## 2024-12-30 NOTE — SUBJECTIVE & OBJECTIVE
Interval History: STEPAN, planing for HD today before discharge.    Review of patient's allergies indicates:  No Known Allergies  Current Facility-Administered Medications   Medication Frequency    albuterol-ipratropium 2.5 mg-0.5 mg/3 mL nebulizer solution 3 mL Q6H PRN    aluminum-magnesium hydroxide-simethicone 200-200-20 mg/5 mL suspension 30 mL QID PRN    cefTRIAXone injection 1 g Q24H    dextrose 10% bolus 125 mL 125 mL PRN    dextrose 10% bolus 250 mL 250 mL PRN    diphenhydrAMINE capsule 25 mg Q6H PRN    famotidine tablet 20 mg Every other day    glucagon (human recombinant) injection 1 mg PRN    glucose chewable tablet 16 g PRN    glucose chewable tablet 24 g PRN    heparin (porcine) injection 1,000 Units PRN    lactulose 20 gram/30 mL solution Soln 30 g TID    melatonin tablet 6 mg Nightly PRN    metoprolol 12.5mg/1.25mL oral solution 6.25 mg BID    miconazole NITRATE 2 % top powder BID    midodrine tablet 15 mg Q8H    midodrine tablet 20 mg Daily PRN    naloxone 0.4 mg/mL injection 0.02 mg PRN    ondansetron injection 4 mg Q8H PRN    polyethylene glycol packet 17 g BID PRN    simethicone chewable tablet 80 mg QID PRN    sodium chloride 0.9% bolus 250 mL 250 mL PRN    sodium chloride 0.9% flush 10 mL PRN       Objective:     Vital Signs (Most Recent):  Temp: 98.3 °F (36.8 °C) (12/30/24 1128)  Pulse: 80 (12/30/24 1128)  Resp: 18 (12/30/24 1128)  BP: (!) 111/57 (12/30/24 1128)  SpO2: 100 % (12/30/24 1128) Vital Signs (24h Range):  Temp:  [97.9 °F (36.6 °C)-98.3 °F (36.8 °C)] 98.3 °F (36.8 °C)  Pulse:  [80-89] 80  Resp:  [14-18] 18  SpO2:  [96 %-100 %] 100 %  BP: (106-128)/(55-61) 111/57     Weight: (!) 140.2 kg (309 lb) (12/26/24 0400)  Body mass index is 40.77 kg/m².  Body surface area is 2.69 meters squared.    I/O last 3 completed shifts:  In: 960 [P.O.:960]  Out: 1000 [Urine:1000]     Physical Exam  Pulmonary:      Effort: Pulmonary effort is normal. No respiratory distress.   Musculoskeletal:          General: Swelling present.      Right lower leg: Edema present.      Left lower leg: Edema present.   Skin:     General: Skin is warm.   Neurological:      Mental Status: He is alert and oriented to person, place, and time.          Significant Labs:  BMP:   Recent Labs   Lab 12/29/24  1108 12/30/24  0614   GLU  --  98   NA  --  131*   K  --  3.4*   CL  --  102   CO2  --  22*   BUN  --  37*   CREATININE  --  2.5*   CALCIUM  --  8.4*   MG 1.9  --      CBC:   Recent Labs   Lab 12/30/24  0614   WBC 7.43   RBC 2.66*   HGB 7.4*   HCT 23.4*   PLT 78*   MCV 88   MCH 27.8   MCHC 31.6*        Significant Imaging:  Labs: Reviewed

## 2024-12-30 NOTE — PT/OT/SLP PROGRESS
Occupational Therapy      Patient Name:  Juan Carlos CECILIA Fredo Julian.   MRN:  [id number]    Patient not seen today secondary to Pt GISSELLE for dialysis this afternoon with plans to d/c to SNF later today.     12/30/2024

## 2024-12-30 NOTE — PLAN OF CARE
Problem: Hemodialysis  Goal: Safe, Effective Therapy Delivery  Outcome: Progressing  Goal: Effective Tissue Perfusion  Outcome: Progressing  Goal: Absence of Infection Signs and Symptoms  Outcome: Progressing    Dialysis tx ended to the right chest perm cath, heparin locked, capped.    Net fluid removed: 1.5L    Prn benadryl given during tx    Report given to nurse Juan Carlos. Pt transported from [facility] to room 81249 by stretcher.

## 2024-12-30 NOTE — PLAN OF CARE
Pt AAOx4. Afebrile. VSS. No report of pain. Pt up to the restroom with 2x assist. 2 BM overnight. No report of injury or a fall this shift. Bed in the lowest setting, call light within reach.

## 2024-12-30 NOTE — NURSING
Notified Dr. Castrejon of  per MD palma to administer PO lopressor suspension and PO midodrine. MAYURI Garcia aware of plan

## 2024-12-30 NOTE — PLAN OF CARE
12/30/24 1129   Post-Acute Status   Post-Acute Authorization Placement   Post-Acute Placement Status Set-up Complete/Auth obtained   Discharge Plan   Discharge Plan A Skilled Nursing Facility   Discharge Plan B Skilled Nursing Facility     CM spoke with Simon Quigley [phone] with [facility] in [place], patient insurance [facility] has provided authorization. Patient will discharge today, Room Number is [place] [address], Report can be called into [phone]. Patient will get dialysis before discharging to SNF. CM will continue to follow until discharged.

## 2024-12-30 NOTE — PLAN OF CARE
CM called and spoke to Chante White [phone]  to notify of patient discharging today to [facility] after dialysis treatment. Per Ms. White facility called her as well. Gave approximation of ETA of transport. \\Ham Carrillo RN, BSN  Case Management  [phone]

## 2024-12-30 NOTE — NURSING
Dr nice at bedside examining pt redness yo rle said rocephein and po antibiodic ordered should take care of it

## 2024-12-30 NOTE — NURSING
Dialysis tx started to the right chest perm cath per orders placed by Dr. Terrazas:      Order Questions    Question Answer Comment   Antibiotics on HD? No    Duration of Treatment 3 hours    Dialyzer F180NR    Dialysate Temperature (C) 36    Target  mL/min    If unable to maintain flow due to inadequate vascular access patency, patient intolerance (i.e. chest pain, access discomfort) or elevated venous pressure, adjust blood flow rate to a minimum of _____mL/min 100     mL/min    K+ Potassium per Protocol    Ca++ Calcium per Protocol    Na+ Sodium per Protocol    Bicarb Bicarbonate per Protocol    Access to be used Other (please specify)    Target UF Other (please specify) 1-1.5   If unable to maintain this UFR due to patient intolerance (i.e. hypotension, chest pain, muscle cramping, nausea or vomiting), adjust UFR to achieve a minimum of _______ liters of UF 0    Fluid Removal Instructions maintain SBP > 90 mmHG

## 2024-12-31 NOTE — NURSING
- Patient left via ambulance to transfer to facility in [place].   - Midodrine and Lopressor were given prior to departure.   - Extra doses of liquid Lopressor and topical medications sent with transporters.  - Handoff report was given via previous nurse. Attempted to call facility (at [phone]) to notify them of patient's departure but there was no answer.

## 2024-12-31 NOTE — PLAN OF CARE
[facility]  Discharge Final Note    Primary Care Provider: Nyla Rios FNP    Expected Discharge Date: 12/30/2024    Patient discharged to [facility] in [place], [place] via [facility] .     Patient's bedside nurse called report into other facility.    Discharge Plan A and Plan B have been determined by review of patient's clinical status, future medical and therapeutic needs, and coverage/benefits for post-acute care in coordination with multidisciplinary team members.        Final Discharge Note (most recent)       Final Note - 12/30/24 1129          Post-Acute Status    Post-Acute Authorization Placement     Post-Acute Placement Status Set-up Complete/Auth obtained                     Important Message from Medicare              Follow-up providers       Nyla Rios FNP   Specialty: Family Medicine   Relationship: PCP - General    [address]  [place] [address]   Phone: [phone]       Next Steps: Follow up in 4 day(s)              After-discharge care                Destination       [facility] [place]   Service: Skilled Nursing    [address]  [facility] [address]   Phone: [phone]                               Future Appointments   Date Time Provider Department Center   2/20/2025  9:00 AM [facility]        scheduled post-discharge follow-up appointment and information added to AVS.     Ham Carrillo, RN, BSN  Case Management  [phone]

## 2025-01-01 NOTE — DISCHARGE SUMMARY
Steffen Greene - Transplant [facility] Medicine  Discharge Summary      Patient Name: Juan Carlos Yoo Sr.  MRN: [id number]  FREDIS: [id number]  Patient Class: IP- Inpatient  Admission Date: 11/20/2024  Hospital Length of Stay: 40 days  Discharge Date and Time: 12/30/2024  8:17 AM  Attending Physician: Stephanie att. providers found   Discharging Provider: Glory Oliva MD  Primary Care Provider: Nyla Rios [facility] Medicine Team: [facility] Glory Oliva MD  Primary Care Team: [facility]    HPI:   69M with PMH of alcoholic cirrhosis, esophageal varices, Afib on eliquis, and HTN who presents for c/o worsening diffuse swelling as well as R arm pain/erythema. He was recently hospitalized 1 month ago at [facility] for volume overload in the setting of decompensated cirrhosis. He was treated with IV diuresis and discharged with adjustment to his diuretics, currently on lasix 60mg BID and metolazone 2.5mg every other week as per family. Patient reports diffuse swelling of his upper and lower extremities in addition to distended abdomen and worsening dyspnea, which he believes is due to recent medication adjustment. Family states he is non-compliant with low sodium diet and fluid restriction. Family states he has been compliant with diuretics, but rom't taken eliquis for 3 days due to issue getting refill. He also reports scratching right arm on door frame 3-4 days ago which has become red and painful after wrapping in in bandage. He claims to be compliant with medications, but is a poor historian. He follows with hepatology, not currently active on transplant list. He states he hasn't consumed alcohol for at least 9 months. Has c/o chills, but denies fever, cough, nausea, vomiting, chest pain, dysuria, hematuria, blood in stool. Workup in ED remarkable for VS: T 97.6 HR 94 RR 22 BP 95/56 O2 sat 97% on RA. Hb 6.7, MCV 75, Plt 81, PT/INR 22.6/2.2, Na 128, K 6.1, BUN/Cr 49/5.7, Alb 2.8, AST/ALT 35/9, Ammonia 104, BNP  363, Trop neg, LA 2.9, CXR: Cardiac size is enlarged similar to prior.  No large volume of pleural fluid noted although there is mild blunting of the right costophrenic angle which may relate to a small amount of pleural fluid.  Suspected right basilar opacity, to be correlated clinically for infection. RUE venous doppler: No thrombus in central veins of the right upper extremity. Patient received vanc/zosyn and lasix 80mg IVP in ED and will be admitted to hospital medicine service for further management.    * No surgery found *      Hospital Course:   69 y.o. male with history of EtOH use disorder, EtOH cirrhosis, HCC s/p y90, morbid obesity BMI 44, HTN, and Afib who presents with severe anasarca and JAE.      Appreciate hepatology and nephrology recommendations.  Diuretics were held because of concern for HRS.  Patient was started on albumin and midodrine per Nephrology recommendations for HRS.  Renal function continued to worsen and recommendation per Nephrology to transfer to ICU for maintaining goal map over 85 on Levophed.  ICU was notified and patient to be assessed to be transferred to ICU.     Patient also has Gram-positive cocci and Gram-negative rods in his blood now.  Possible sources could be got translocation and barrier related infection through skin as he has been significantly edematous and has been oozing.  Has been on vancomycin and Rocephin.  Id was consulted this morning.  Repeat blood cultures were obtained.     Continues to have anemia.  Hemoglobin dropped on 11/20 and was given 1 unit of packed red blood cells.  Did not respond appropriately.  Hemoglobin dropped again to 6.8 on 11/21 and was given 1 unit of packed red blood cells.  Hemoglobin again on 11/22 is 7.2.  No reported melena or hematochezia.  Patient was on Eliquis for atrial fibrillation prior to admission which was held.  Given history of esophageal varices and portal hypertensive gastropathy whereas also could be component of  slow bleeding, under production due to CKD and hemolysis while also with decompensated cirrhosis.  Started on Protonix IV b.i.d. and octreotide.     Also clarified with hepatology.  Given patient's current infection we will await ID recommendations however will be challenging to consider transplant evaluation at this time.  Current concerns regarding transplant include higher BMI, frailty / debility.      Goal clarification with the patient and family.  Patient at this time wants to be full code and continue with Levophed in ICU.  They would consider discussion with palliative care in a day or so if things do not get better.      In MICU patient started on Levophed, however titration remained difficult given tachycardic response from the patient.      11/24 Trialysis and arterial lines placed overnight and currently on levo and norepi. SLED today.     11/25 Boo dc. Increased midodrine. Continue steroids until d/c pressors. Discussed with Nephrology about our concern for sustained use of pressors to achieve their MAP goals of 85. Will follow up tomorrow.      11/26 Platelets 48. Repeat 38 confirming thrombocytopenia. Concern for HIT. D/c heparin. Increased lactulose dosing. Bladder scan revealed urine in bladder. Boo placed. Off vaso. Continuing levo. Maintain MAP goals 80. PT/OT consulted.      11/27 MAP goal 75-80. Heparin antibody result pending.      11/28 Pt with abdominal pain, decreased bowel movements, emesis. Lactic acid 2.9 and repeat 2.7. Less concerned for mesenteric ischemia and more of a concern was for SBO. NG tube placed with subsequent suction of 500mL fluid. Abdomen less distended after placement and pt subsequently had bowel movement. MAP Goal of 60 with plan to come off pressors entirely and switch to dialysis after permacath, as he is not  liver transplant eligible at this time.      11/29 Pt with ileus. Clear liquids for now. Platelets 24. HIT versus zosyn induced? Peripheral smear sent.  Zosyn changed to kaylah. Consent and transfuse 1U. GOC conversation today. Pt opting for long term dialysis.      11/30 naeon. Started back on heparin. One time dose of 120mg lasix today. Hold off SLED/SCUF today and give IV lasix 120 mg once; plan for SLED/SCUF tomorrow      12/1 Patient is becoming more dependent on dialysis. Not a current liver transplant candidate. Still complaining of abdominal pain after discontinuing the NGT. AXR with evidence of dilated bowel loops. Brown bomb administered. The days following administration of the brown bomb, patient had more frequent bowel movements and was able to pass flatulence. Constipation eventually resolved.   Given the need for pressor support, he was worked up for adrenal insufficiency which was positive. Consequently, he was started on steroids for adrenal insufficiency. His blood pressures improved, however, he still required pressor support, particularly during dialysis. Pressor support was eventually weaned off. Patient was started on midodrine to help with BP. Nephrology trialed intermittent HD and patient seemed to tolerate it well. Nephrology recommended placement of tunneled catheter for HD. Patient was medically stable for step down to the hospital medicine floors.     Patient underwent testing with cosyntropin stimulation test, results are not compatible with adrenal insufficiency, cont hydrocortisone taper. Patient tolerating HD. IR placed RIJ TDC 12/16. Discharge planning to SNF.    12/19- patient was walking to the bathroom and had a mechanical fall with a his foot stumbling and hit his face of the ledge in the bathroom.  Although he walked with a walker and had nursing staff to help however could not be controlled.  Small contusion of the right glabella and the nose bridge.  CT head without any acute fractures.  Also noting abdominal distention.  We will request a paracentesis.  CT abdomen without any concern for hematoma.    12/20- accepted for nursing  facility however awaiting for hemodialysis chair.  Given this is a new  admission possible anticipated discharge on Monday per SNF  and requesting attempts to see if systolics can improve > 110.  we will lower his Lopressor if possible.  We will hold his Flomax.     - SNF admission on Monday anticipated.     - Blood pressures have been borderline. HD likely tomorrow.     - Cellulitis of R sided abdomen wall, edges marked. No systemic signs. Continued on rocephin. Chlorhexidine bath ordered.     - Cellulitis appears better, continue rocephin, HD completed today.     - HD yesterday, anticipating next session on Friday and so he can get his next HD on Monday. Abdominal cellulitis looks better.     Discharge postponed as insurance auth . Planning for discharge .     - patient received HD this morning. Cleared for discharge, SNF approved and handoff given, confirmed with CM. Pressures stable. Patient mentating well.     New cellulitis noted on Right lower extremity, patient is afebrile and has been on antibiotics for abdominal cellulitis, he had received MRSA decolonization and bath few days ago Edges marked, can be re-assessed at SNF and can extend antibiotic duration, Received rocephin today, to receive cefadroxil on Thursday after dialysis.       Review of Systems   Constitutional:  Negative for chills and fever.   HENT:  Negative for nosebleeds.    Respiratory:  Negative for cough.    Cardiovascular:  Positive for leg swelling.   Gastrointestinal:  Positive for abdominal distention. Negative for abdominal pain and diarrhea.   Genitourinary:  Positive for scrotal swelling.   Skin:  Positive for color change and wound. , Right lower extremity mild cellulitis   Neurological:  Negative for dizziness and light-headedness.   Psychiatric/Behavioral:  Negative for confusion.    All other systems reviewed and are negative.    Objective:     Vital Signs (Most Recent):  Temp: 98.3 °F  (36.8 °C) (12/30/24 1930)  Pulse: 85 (12/30/24 1930)  Resp: 18 (12/30/24 1930)  BP: (!) 105/52 (12/30/24 1930)  SpO2: 100 % (12/30/24 1930) Vital Signs (24h Range):      Weight: (!) 140.2 kg (309 lb)  Body mass index is 40.77 kg/m².    No intake or output data in the 24 hours ending 12/31/24 2350         Physical Exam  Vitals and nursing note reviewed.   Constitutional:       General: He is awake. He is not in acute distress.     Appearance: He is obese. He is ill-appearing.   HENT:      Head:      Comments: Contusion and small bump around right glabella.      Mouth/Throat:      Comments: Lower lip injury mostly healed  Eyes:      Extraocular Movements: Extraocular movements intact.      Conjunctiva/sclera: Conjunctivae normal.   Cardiovascular:      Rate and Rhythm: Normal rate.   Pulmonary:      Effort: Pulmonary effort is normal. No respiratory distress.   Abdominal:      General: There is distension.      Palpations: Abdomen is soft.      Tenderness: There is no abdominal tenderness.   Musculoskeletal:         General: No swelling or tenderness.      Right lower leg: Edema present.      Left lower leg: Edema present.      Comments: Erythema of the right lower extreity   Skin:     General: Skin is warm.      Coloration: Skin is not jaundiced.      Findings: Bruising present. Right lower extremity mild cellulitis  Neurological:      Mental Status: He is alert and oriented to person, place, and time.      Motor: No weakness.   Psychiatric:         Behavior: Behavior normal.            Goals of Care Treatment Preferences:  Code Status: Full Code    Health care agent: Pt reports he has boni TREVINO and his son is MPOCECILIA.  Health care agent number: No value filed.          What is most important right now is to focus on remaining as independent as possible, symptom/pain control, extending life as long as possible, even it it means sacrificing quality, curative/life-prolongation (regardless of treatment burdens).   Accordingly, we have decided that the best plan to meet the patient's goals includes continuing with treatment.      SDOH Screening:  The patient was screened for utility difficulties, food insecurity, transport difficulties, housing insecurity, and interpersonal safety and there were no concerns identified this admission.     Consults:   Consults (From admission, onward)          Status Ordering Provider     Inpatient consult to Midline team  Once        Provider:  (Not yet assigned)    Completed DARIO CERVANTES     Inpatient consult to Interventional Radiology  Once        Provider:  (Not yet assigned)    Completed DARIO CERVANTES     Inpatient consult to Endocrinology  Once        Provider:  (Not yet assigned)    Completed DARIO CERVANTES     Inpatient consult to Infectious Diseases  Once        Provider:  (Not yet assigned)    Completed LISSETH BLANC     Inpatient consult to Palliative Care  Once        Provider:  (Not yet assigned)    Completed RAEANN GASPAR     Inpatient consult to Critical Care Medicine  Once        Provider:  (Not yet assigned)    Completed TAMEKA DON     Inpatient consult to Infectious Diseases  Once        Provider:  (Not yet assigned)    Completed ARIANTAMEKA     Inpatient consult to Nephrology  Once        Provider:  (Not yet assigned)    Completed DARIO CERVANTES     Inpatient consult to Hepatology  Once        Provider:  (Not yet assigned)    Completed DARIO CERVANTES            * Decompensated cirrhosis  Etoh Cirrhosis  Hx of HCC s/p Y90    MELD 3.0: 29 at 12/30/2024  6:14 AM  MELD-Na: 30 at 12/30/2024  6:14 AM  Calculated from:  Serum Creatinine: On dialysis. Using the maximum value.  Serum Sodium: 131 mmol/L at 12/30/2024  6:14 AM  Total Bilirubin: 2.2 mg/dL at 12/30/2024  6:14 AM  Serum Albumin: 2.1 g/dL at 12/30/2024  6:14 AM  INR(ratio): 1.5 at 12/30/2024  6:14 AM  Age at listing (hypothetical): 69 years  Sex: Male at 12/30/2024  6:14 AM    Cont lactulose    H/O HCC:  treated with Y90 in June 2023.Last MRI abd w/o evidence of residual or recurrent disease 3/24/24. Due for repeat surveillance MRI as an outpatient.    Appreciate hepatology recommendations.   Current concerns regarding transplant include higher BMI, frailty / debility.       Discharge planning to SNF        Cellulitis  Ceftriaxone  Improving  Plan to complete 10 day course, last dose planned for 1/32  for cefadroxil after HD, can be extended based on clinical response including the lower extremity and abdomen   Miconazole for candidal intertrigo      Morbid obesity  Body mass index is 40.77 kg/m². Morbid obesity complicates all aspects of disease management from diagnostic modalities to treatment. Weight loss encouraged and health benefits explained to patient.         Adrenal insufficiency  On going problem since admission, most likely multifactorial - component of liver dysfunction, HRS, sepsis on admission.  S/p pressors in ICU     Random cortisol was 6   Persistent hypotension thought to be adrenal insufficiency. Patient underwent testing with cosyntropin stimulation test, results are not compatible with adrenal insufficiency. appreciate endocrinology recommendations   Completed hydrocortisone taper      Moderate protein-calorie malnutrition  Nutrition consulted. Most recent weight and BMI monitored-     Measurements:  Wt Readings from Last 1 Encounters:   12/26/24 (!) 140.2 kg (309 lb)   Body mass index is 40.77 kg/m².    Patient has been screened and assessed by RD.    Malnutrition Type:  Context: acute illness or injury  Level: moderate    Malnutrition Characteristic Summary:  Energy Intake (Malnutrition): less than 75% for greater than 7 days  Subcutaneous Fat (Malnutrition): mild depletion  Muscle Mass (Malnutrition): mild depletion  Fluid Accumulation (Malnutrition): moderate to severe    Interventions/Recommendations (treatment strategy):  1.)      Hypotension        Edema  Volume mgmt with  RRT      Debility  Patient with Acute on chronic debility due to chronic unspecified fatigue, age-related physical debility, and other reduced mobility. The patient's latest AMPAC (Activity Measure for Post Acute Care) Score is listed below.    AM-PAC Score - How much help does the patient need for each activity listed  Basic Mobility Total Score: 17  Turning over in bed (including adjusting bedclothes, sheets and blankets)?: A little  Sitting down on and standing up from a chair with arms (e.g., wheelchair, bedside commode, etc.): A little  Moving from lying on back to sitting on the side of the bed?: A little  Moving to and from a bed to a chair (including a wheelchair)?: A little  Need to walk in hospital room?: A little  Climbing 3-5 steps with a railing?: A lot    Plan  - Progressive mobility protocol initated  - PT/OT consulted  - Fall precautions in place  - PT/OT recommending LI therapy. Planning for SNF      Advanced care planning/counseling discussion        Palliative care encounter  Goals of care, counseling/discussion  Advance Care Planning  Patient requests FULL CODE status and would like to continue current treatment      Abdominal pain        Gram-negative bacteremia  Blood cultures from admission with B. Diminuta, micrococcus luteus, lysinobacillus species. Seen by ID previously who recommended stopping antibiotics due to concern these were contaminants. Repeat blood cultures have been no growth. Has since been transferred to ICU with increased pressor requirement. Blood cultures repeated on 11/24 are no growth x 24 hours.   11/29 Switched from zosyn to meropenem due to concern of thrombocytopenia.   Finished meropenem course 12/8     Encephalopathy, metabolic  2/2 decompensated cirrhosis and JAE  Resolved    Hyponatremia  Hyponatremia is likely due to Cirrhosis. The patient's most recent sodium results are listed below.  Recent Labs     12/27/24  0901 12/29/24  0802   * 131*       Plan  -  Correct the sodium by 4-6mEq in 24 hours.   - Appreciate nephrology recommendations  - Monitor sodium daily  - Patient hyponatremia is stable  - Mgmt with HD    Thrombocytopenia  The likely etiology of thrombocytopenia is liver disease. The patients 3 most recent labs are listed below.  Recent Labs     24  1749 24  1108   PLT 64* 75*       Plan  - Will transfuse if platelet count is <50k (if undergoing surgical procedure or have active bleeding).      Microcytic anemia  Anemia is likely due to chronic disease due to Chronic liver disease. Most recent hemoglobin and hematocrit are listed below.  Recent Labs     24  0614   HGB 7.4*   HCT 23.4*       Plan  - Monitor serial CBC: Daily  - Transfuse PRBC if patient becomes hemodynamically unstable, symptomatic or H/H drops below .  - Patient has received 1 units of PRBCs on , , 12/15  - Patient's anemia is currently stable  - Hb baseline 7-8. No obvious bleeding  - Eliquis held on admission. DVT prophylaxis held.     Stage 3a chronic kidney disease  ESRD on HD  Nephrology following  Will try to remove more fluid  Strict intake and output  Renally dose all medications  Avoid nephrotoxic agents  IR placed RIJ TDC   Outpatient HD arranged      JAE (acute kidney injury)  Baseline creatinine is  1.5 . Most recent creatinine and eGFR are listed below.    Recent Labs     24  0614   CREATININE 2.5*   EGFRNORACEVR 26.8*        Plan  - JAE is worsening. Will continue current treatment  - Avoid nephrotoxins and renally dose meds for GFR listed above  - Monitor urine output, serial BMP, and adjust therapy as needed    - Etiology includes volume overload, obstruction, hypotension, possible HRS  Baseline creatinine is 1.5. May require dialysis long term.   Patient tolerated intermittent HD well ()    IR placed RIJ TDC .     HD chair arranged. Delayed with holiday scheduled.   Discharge postponed as insurance auth . Can likely  discharge 12/30    Atrial fibrillation  Patient has persistent (7 days or more) atrial fibrillation. Patient is currently in atrial fibrillation. [id number] Score: 1. The patients heart rate in the last 24 hours is as follows:  Pulse  Min: 81  Max: 89       Plan  - Patient's afib is currently controlled  Holding home Eliquis in the setting of recent low blood counts, consider continue holding on DC. Will discuss with patient regarding risks and benefits  Continue metoprolol 6.25 BID     Coagulopathy  2/2 cirrhosis. See plan below    Received 3 doses of IV vitamin K.  End Stage Liver Disease precipitated coagulopathy  Heparin d/c d/t thrombocytopenia  Cont to hold eliquis on discharge          Goals of care, counseling/discussion  Advance Care Planning    Code Status  In light of the patients advanced and life limiting illness,I engaged the the patient and family in a voluntary conversation about the patient's preferences for care  at the very end of life. The patient wishes to have a natural, peaceful death.  Along those lines, the patient wishes to have CPR or other invasive treatments performed when his heart and/or breathing stops. I communicated to the patient and family. Continue FULL CODE.    A total of 15 min was spent on advance care planning, goals of care discussion, emotional support, formulating and communicating prognosis and exploring burden/benefit of various approaches of treatment. This discussion occurred on a fully voluntary basis with the verbal consent of the patient and/or family.           Final Active Diagnoses:    Diagnosis Date Noted POA    PRINCIPAL PROBLEM:  Decompensated cirrhosis [K72.90, K74.60] 10/04/2024 Yes    Cellulitis [L03.90] 12/28/2024 No    Morbid obesity [E66.01] 12/19/2024 Yes    Adrenal insufficiency [E27.40] 12/14/2024 Yes    Moderate protein-calorie malnutrition [E44.0] 12/12/2024 No    Edema [R60.9] 12/04/2024 Yes    Palliative care encounter [Z51.5] 12/02/2024 Not  Applicable    Debility [R53.81] 12/02/2024 Yes    Gram-negative bacteremia [R78.81] 11/22/2024 Yes    Thrombocytopenia [D69.6] 11/21/2024 Yes    Hyponatremia [E87.1] 11/21/2024 Yes     Chronic    Encephalopathy, metabolic [G93.41] 11/21/2024 Yes    Stage 3a chronic kidney disease [N18.31] 11/20/2024 Yes    Microcytic anemia [D50.9] 11/20/2024 Yes    JAE (acute kidney injury) [N17.9] 10/04/2024 Yes    Atrial fibrillation [I48.91] 03/30/2023 Yes    Coagulopathy [D68.9] 06/15/2022 Yes    Goals of care, counseling/discussion [Z71.89] 12/29/2017 Not Applicable      Problems Resolved During this Admission:    Diagnosis Date Noted Date Resolved POA    Hyperkalemia [E87.5] 12/19/2024 12/19/2024 Yes    Hypokalemia [E87.6] 12/19/2024 12/19/2024 Yes    Hypophosphatemia [E83.39] 12/19/2024 12/19/2024 Yes    Hyperkalemia [E87.5] 11/21/2024 11/22/2024 No    Severe sepsis [A41.9, R65.20] 11/20/2024 12/19/2024 Yes       Discharged Condition: stable    Disposition: Home or Self Care    Follow Up:   Contact information for follow-up providers       Nyla Rios FNP Follow up in 4 day(s).    Specialty: Family Medicine  Contact information:  [address]  [place] [address]  [phone]                       Contact information for after-discharge care       Destination       [facility] .    Service: Skilled Nursing  Contact information:  [address] [facility] [address]  [phone]                                 Patient Instructions:      Ambulatory referral/consult to Hepatology   Standing Status: Future   Referral Priority: Routine Referral Type: Consultation   Referral Reason: Specialty Services Required   Requested Specialty: Hepatology   Number of Visits Requested: 1     Diet renal     Notify your health care provider if you experience any of the following:  temperature >100.4     Notify your health care provider if you experience any of the following:  persistent nausea and  vomiting or diarrhea     Notify your health care provider if you experience any of the following:  severe uncontrolled pain     Notify your health care provider if you experience any of the following:  difficulty breathing or increased cough     Notify your health care provider if you experience any of the following:  persistent dizziness, light-headedness, or visual disturbances     Notify your health care provider if you experience any of the following:  increased confusion or weakness     Notify your health care provider if you experience any of the following:  temperature >100.4     Notify your health care provider if you experience any of the following:  redness, tenderness, or signs of infection (pain, swelling, redness, odor or green/yellow discharge around incision site)     Notify your health care provider if you experience any of the following:  difficulty breathing or increased cough     Notify your health care provider if you experience any of the following:  persistent dizziness, light-headedness, or visual disturbances     Notify your health care provider if you experience any of the following:  increased confusion or weakness     Activity as tolerated       Significant Diagnostic Studies:     CMP       Recent Labs   Lab 12/30/24  0614   *   K 3.4*      CO2 22*   GLU 98   BUN 37*   CREATININE 2.5*   CALCIUM 8.4*   PROT 6.0   ALBUMIN 2.1*   BILITOT 2.2*   ALKPHOS 83   AST 30   ALT 16   ANIONGAP 7*    and CBC       Recent Labs   Lab 12/30/24  0614   WBC 7.43   HGB 7.4*   HCT 23.4*   PLT 78*          Pending Diagnostic Studies:       Procedure Component Value Units Date/Time    Magnesium [[id number]] Collected: 12/15/24 0520    Order Status: Sent Lab Status: No result     Specimen: Blood     Magnesium [[id number]] Collected: 12/04/24 0222    Order Status: Sent Lab Status: No result     Specimen: Blood     Magnesium [[id number]] Collected: 12/04/24 0222    Order Status: Sent Lab Status: No  result     Specimen: Blood     Renal function panel [[id number]] Collected: 12/04/24 0222    Order Status: Sent Lab Status: No result     Specimen: Blood     Renal function panel [[id number]] Collected: 12/04/24 0222    Order Status: Sent Lab Status: No result     Specimen: Blood            Medications:  Reconciled Home Medications:      Medication List        START taking these medications      famotidine 20 MG tablet  Commonly known as: PEPCID  Take 1 tablet (20 mg total) by mouth every other day.     lactulose 20 gram/30 mL Soln  Commonly known as: CHRONULAC  Take 45 mLs (30 g total) by mouth 3 (three) times daily. TITRATE TO 3-4 BOWEL MOVEMENTS DAILY.     metoprolol tartrate 25 MG tablet  Commonly known as: LOPRESSOR  Take 0.5 tablets (12.5 mg total) by mouth 2 (two) times daily.     midodrine 5 MG Tab  Commonly known as: PROAMATINE  Take 3 tablets (15 mg total) by mouth every 8 (eight) hours.     polyethylene glycol 17 gram Pwpk  Commonly known as: GLYCOLAX  Take 17 g by mouth 2 (two) times daily as needed for Constipation.            CHANGE how you take these medications      cyanocobalamin 1000 MCG tablet  Commonly known as: VITAMIN B-12  Take 1 tablet (1,000 mcg total) by mouth once daily.  What changed:   medication strength  how much to take            CONTINUE taking these medications      ascorbic acid (vitamin C) 500 MG tablet  Commonly known as: VITAMIN C  Take 500 mg by mouth once daily.     fish oil-omega-3 fatty acids 300-1,000 mg capsule  Take 1 g by mouth 2 (two) times daily.            STOP taking these medications      aMILoride 5 MG Tab  Commonly known as: MIDAMOR     amLODIPine 10 MG tablet  Commonly known as: NORVASC     furosemide 40 MG tablet  Commonly known as: LASIX     metoprolol succinate 25 MG 24 hr tablet  Commonly known as: TOPROL-XL     potassium chloride SA 20 MEQ tablet  Commonly known as: KLOR-CON M20     tamsulosin 0.4 mg Cap  Commonly known as: FLOMAX            ASK your doctor  about these medications      cefadroxil 500 MG Cap  Commonly known as: DURICEF  Take 2 capsules (1,000 mg total) by mouth once. Give after HD session on 12/30 for 1 dose  Ask about: Should I take this medication?              Indwelling Lines/Drains at time of discharge:   Lines/Drains/Airways       Central Venous Catheter Line  Duration                  Hemodialysis Catheter 12/16/24 1152 right internal jugular 15 days                    Time spent on the discharge of patient: 60 minutes         Glory Oliva MD  Department of Hospital Medicine  [facility] - Transplant Stepdown

## 2025-01-01 NOTE — SUBJECTIVE & OBJECTIVE
Interval History: NAEON. Tolerating HD. No acute issues. Discharge postponed as insurance auth . Planning for discharge .    Review of Systems   Constitutional:  Negative for chills and fever.   HENT:  Negative for nosebleeds.    Respiratory:  Negative for cough.    Cardiovascular:  Positive for leg swelling.   Gastrointestinal:  Positive for abdominal distention. Negative for abdominal pain and diarrhea.   Genitourinary:  Positive for scrotal swelling.   Skin:  Positive for color change and wound.   Neurological:  Negative for dizziness and light-headedness.   Psychiatric/Behavioral:  Negative for confusion.    All other systems reviewed and are negative.    Objective:     Vital Signs (Most Recent):  Temp: 98.3 °F (36.8 °C) (24)  Pulse: 85 (24)  Resp: 18 (24)  BP: (!) 105/52 (24)  SpO2: 100 % (24) Vital Signs (24h Range):      Weight: (!) 140.2 kg (309 lb)  Body mass index is 40.77 kg/m².    No intake or output data in the 24 hours ending 24 2350         Physical Exam  Vitals and nursing note reviewed.   Constitutional:       General: He is awake. He is not in acute distress.     Appearance: He is obese. He is ill-appearing.   HENT:      Head:      Comments: Contusion and small bump around right glabella.      Mouth/Throat:      Comments: Lower lip injury mostly healed  Eyes:      Extraocular Movements: Extraocular movements intact.      Conjunctiva/sclera: Conjunctivae normal.   Cardiovascular:      Rate and Rhythm: Normal rate.   Pulmonary:      Effort: Pulmonary effort is normal. No respiratory distress.   Abdominal:      General: There is distension.      Palpations: Abdomen is soft.      Tenderness: There is no abdominal tenderness.   Musculoskeletal:         General: No swelling or tenderness.      Right lower leg: Edema present.      Left lower leg: Edema present.      Comments: Erythema of the right lower extreity   Skin:     General:  Skin is warm.      Coloration: Skin is not jaundiced.      Findings: Bruising present.   Neurological:      Mental Status: He is alert and oriented to person, place, and time.      Motor: No weakness.   Psychiatric:         Behavior: Behavior normal.            Significant Labs:    CBC/Anemia Profile:  Recent Labs   Lab 12/30/24  0614   WBC 7.43   HGB 7.4*   HCT 23.4*   PLT 78*   MCV 88   RDW 27.4*          Chemistries:  Recent Labs   Lab 12/30/24  0614   *   K 3.4*      CO2 22*   BUN 37*   CREATININE 2.5*   CALCIUM 8.4*   ALBUMIN 2.1*   PROT 6.0   BILITOT 2.2*   ALKPHOS 83   ALT 16   AST 30       All pertinent labs within the past 24 hours have been reviewed.    Significant Imaging:  I have reviewed all pertinent imaging results/findings within the past 24 hours.

## 2025-01-02 DIAGNOSIS — K74.60 DECOMPENSATED CIRRHOSIS: Primary | ICD-10-CM

## 2025-01-02 DIAGNOSIS — K72.90 DECOMPENSATED CIRRHOSIS: Primary | ICD-10-CM

## 2025-01-02 NOTE — ASSESSMENT & PLAN NOTE
Anemia is likely due to chronic disease due to Chronic liver disease. Most recent hemoglobin and hematocrit are listed below.  Recent Labs     12/30/24  0614   HGB 7.4*   HCT 23.4*       Plan  - Monitor serial CBC: Daily  - Transfuse PRBC if patient becomes hemodynamically unstable, symptomatic or H/H drops below 7/21.  - Patient has received 1 units of PRBCs on 11/20, 11/21, 12/15  - Patient's anemia is currently stable  - Hb baseline 7-8. No obvious bleeding  - Eliquis held on admission. DVT prophylaxis held.

## 2025-01-02 NOTE — ASSESSMENT & PLAN NOTE
Baseline creatinine is  1.5 . Most recent creatinine and eGFR are listed below.    Recent Labs     24  0614   CREATININE 2.5*   EGFRNORACEVR 26.8*        Plan  - JAE is worsening. Will continue current treatment  - Avoid nephrotoxins and renally dose meds for GFR listed above  - Monitor urine output, serial BMP, and adjust therapy as needed    - Etiology includes volume overload, obstruction, hypotension, possible HRS  Baseline creatinine is 1.5. May require dialysis long term.   Patient tolerated intermittent HD well ()    IR placed RIJ TDC .     HD chair arranged. Delayed with holiday scheduled.   Discharge postponed as insurance auth . Can likely discharge

## 2025-01-02 NOTE — ASSESSMENT & PLAN NOTE
Etoh Cirrhosis  Hx of HCC s/p Y90    MELD 3.0: 29 at 12/30/2024  6:14 AM  MELD-Na: 30 at 12/30/2024  6:14 AM  Calculated from:  Serum Creatinine: On dialysis. Using the maximum value.  Serum Sodium: 131 mmol/L at 12/30/2024  6:14 AM  Total Bilirubin: 2.2 mg/dL at 12/30/2024  6:14 AM  Serum Albumin: 2.1 g/dL at 12/30/2024  6:14 AM  INR(ratio): 1.5 at 12/30/2024  6:14 AM  Age at listing (hypothetical): 69 years  Sex: Male at 12/30/2024  6:14 AM    Cont lactulose    H/O HCC: treated with Y90 in June 2023.Last MRI abd w/o evidence of residual or recurrent disease 3/24/24. Due for repeat surveillance MRI as an outpatient.    Appreciate hepatology recommendations.   Current concerns regarding transplant include higher BMI, frailty / debility.       Discharge planning to SNF

## 2025-01-02 NOTE — ASSESSMENT & PLAN NOTE
Ceftriaxone  Improving  Plan to complete 10 day course, last dose planned for 1/32  for cefadroxil after HD, can be extended based on clinical response including the lower extremity and abdomen   Miconazole for candidal intertrigo

## 2025-01-08 ENCOUNTER — HOSPITAL ENCOUNTER (EMERGENCY)
Facility: HOSPITAL | Age: 70
Discharge: HOME | End: 2025-01-08
Attending: EMERGENCY MEDICINE
Payer: COMMERCIAL

## 2025-01-08 VITALS
RESPIRATION RATE: 18 BRPM | OXYGEN SATURATION: 99 % | SYSTOLIC BLOOD PRESSURE: 114 MMHG | WEIGHT: 309 LBS | DIASTOLIC BLOOD PRESSURE: 58 MMHG | TEMPERATURE: 98 F | BODY MASS INDEX: 40.77 KG/M2 | HEART RATE: 84 BPM

## 2025-01-08 DIAGNOSIS — T82.49XA OTHER COMPLICATION OF VASCULAR DIALYSIS CATHETER, INITIAL ENCOUNTER: Primary | ICD-10-CM

## 2025-01-08 PROCEDURE — 99281 EMR DPT VST MAYX REQ PHY/QHP: CPT

## 2025-01-08 NOTE — ED PROVIDER NOTES
Encounter Date: 1/8/2025       History   No chief complaint on file.    69-year-old male presents to the ER, EMS reports he had 2 drops of blood coming from his dialysis catheter insertion site this morning at 7:00 a.m..  Encouraged to come to the emergency department by nursing home staff to check it out.  No bleeding now.  No signs of infection, no pain.  Patient states he had dialysis yesterday, and occasionally this happens.  No other issues      Review of patient's allergies indicates:  No Known Allergies  Past Medical History:   Diagnosis Date    Atrial fibrillation     Bulging disc     Cirrhosis     Colon polyp 12/29/2017    Rpt 5 yrs    EV (esophageal varices)     Hypertension 3/30/2023    Skin cancer 03/2017    Thrombocytopenia      Past Surgical History:   Procedure Laterality Date    CATHETERIZATION OF BOTH LEFT AND RIGHT HEART N/A 2/8/2023    Procedure: CATHETERIZATION, HEART, BOTH LEFT AND RIGHT;  Surgeon: Flo Malone MD;  Location: [facility] CATH LAB;  Service: Cardiology;  Laterality: N/A;  low bleeding risk 1.0%    CHOLECYSTECTOMY      COLONOSCOPY      COLONOSCOPY N/A 12/29/2017    ta rpt 2022    CORONARY ANGIOGRAPHY N/A 2/8/2023    Procedure: ANGIOGRAM, CORONARY ARTERY;  Surgeon: Flo Malone MD;  Location: [facility] CATH LAB;  Service: Cardiology;  Laterality: N/A;    HERNIA REPAIR      SKIN BIOPSY  03/2017    TONSILLECTOMY      UPPER GASTROINTESTINAL ENDOSCOPY  2011    EV    UPPER GASTROINTESTINAL ENDOSCOPY  2016    VASECTOMY       Family History   Problem Relation Name Age of Onset    Hyperlipidemia Brother      Cancer Maternal Uncle          Leukemia    Heart disease Maternal Uncle      Ovarian cancer Sister      Colon cancer Neg Hx       Social History     Tobacco Use    Smoking status: Never    Smokeless tobacco: Never   Substance Use Topics    Alcohol use: Not Currently     Alcohol/week: 0.0 standard drinks of alcohol    Drug use: No     Review of Systems   Constitutional:  Negative for  fever.   HENT:  Negative for sore throat.    Respiratory:  Negative for shortness of breath.    Cardiovascular:  Negative for chest pain.   Gastrointestinal:  Negative for nausea.   Genitourinary:  Negative for dysuria.   Musculoskeletal:  Negative for back pain.   Skin:  Negative for rash.   Neurological:  Negative for weakness.   Hematological:  Does not bruise/bleed easily.   All other systems reviewed and are negative.      Physical Exam     Initial Vitals   BP Pulse Resp Temp SpO2   -- -- -- -- --      MAP       --         Physical Exam    Nursing note and vitals reviewed.  Constitutional: He appears well-developed and well-nourished. He is not diaphoretic. No distress.   HENT:   Head: Normocephalic and atraumatic.   Eyes: Conjunctivae and EOM are normal. Pupils are equal, round, and reactive to light. Right eye exhibits no discharge. Left eye exhibits no discharge. No scleral icterus.   Neck: Neck supple. No JVD present.   Normal range of motion.  Cardiovascular:  Normal rate, regular rhythm, normal heart sounds and intact distal pulses.           No murmur heard.  Pulmonary/Chest: Breath sounds normal. No stridor. No respiratory distress. He has no wheezes. He has no rhonchi. He has no rales. He exhibits no tenderness.   Dialysis catheter in right subclavian, no bleeding, nontender, no signs of infection   Abdominal: Abdomen is soft. Bowel sounds are normal. He exhibits no distension and no mass. There is no abdominal tenderness. There is no rebound and no guarding.   Musculoskeletal:         General: No tenderness or edema. Normal range of motion.      Cervical back: Normal range of motion and neck supple.     Neurological: He is alert and oriented to person, place, and time. He has normal strength. GCS score is 15. GCS eye subscore is 4. GCS verbal subscore is 5. GCS motor subscore is 6.   Skin: Skin is warm and dry. Capillary refill takes less than 2 seconds.         ED Course   Procedures  Labs Reviewed -  No data to display       Imaging Results    None          Medications - No data to display  Medical Decision Making                        Medical Decision Making:   Differential Diagnosis:   Bleeding from dialysis catheter, subsided             Clinical Impression:  Final diagnoses:  [T82.49XA] Other complication of vascular dialysis catheter, initial encounter - bleeding, subsided prior to arrival (Primary)          ED Disposition Condition    Discharge Stable          ED Prescriptions    None       Follow-up Information       Follow up With Specialties Details Why Contact Info Additional Information    Primary care physician  In 2 days       [facility] Emergency Department Emergency Medicine  As needed [address] [place] [id number] [phone] Floor 1             Zack Ansari MD  01/08/25 1597

## 2025-01-20 ENCOUNTER — LAB VISIT (OUTPATIENT)
Dept: LAB | Facility: HOSPITAL | Age: 70
End: 2025-01-20
Attending: NURSE PRACTITIONER
Payer: COMMERCIAL

## 2025-01-20 DIAGNOSIS — Z79.899 NEED FOR PROPHYLACTIC CHEMOTHERAPY: Primary | ICD-10-CM

## 2025-01-20 LAB
ALBUMIN SERPL BCP-MCNC: 2.3 G/DL (ref 3.5–5.2)
ALP SERPL-CCNC: 73 U/L (ref 55–135)
ALT SERPL W/O P-5'-P-CCNC: 12 U/L (ref 10–44)
ANION GAP SERPL CALC-SCNC: 11 MMOL/L (ref 8–16)
AST SERPL-CCNC: 34 U/L (ref 10–40)
BILIRUB SERPL-MCNC: 2.9 MG/DL (ref 0.1–1)
BUN SERPL-MCNC: 21 MG/DL (ref 8–23)
CALCIUM SERPL-MCNC: 8.9 MG/DL (ref 8.7–10.5)
CHLORIDE SERPL-SCNC: 99 MMOL/L (ref 95–110)
CO2 SERPL-SCNC: 24 MMOL/L (ref 23–29)
CREAT SERPL-MCNC: 2.2 MG/DL (ref 0.5–1.4)
EST. GFR  (NO RACE VARIABLE): 31.2 ML/MIN/1.73 M^2
GLUCOSE SERPL-MCNC: 88 MG/DL (ref 70–110)
POTASSIUM SERPL-SCNC: 3.4 MMOL/L (ref 3.5–5.1)
PROT SERPL-MCNC: 7.1 G/DL (ref 6–8.4)
SODIUM SERPL-SCNC: 134 MMOL/L (ref 136–145)

## 2025-01-20 PROCEDURE — 36415 COLL VENOUS BLD VENIPUNCTURE: CPT | Performed by: NURSE PRACTITIONER

## 2025-01-20 PROCEDURE — 80053 COMPREHEN METABOLIC PANEL: CPT | Performed by: NURSE PRACTITIONER

## 2025-02-18 PROBLEM — N18.32 STAGE 3B CHRONIC KIDNEY DISEASE: Status: ACTIVE | Noted: 2024-11-20

## 2025-02-18 PROBLEM — N18.4 CKD (CHRONIC KIDNEY DISEASE) STAGE 4, GFR 15-29 ML/MIN: Status: ACTIVE | Noted: 2024-11-20

## 2025-02-18 PROBLEM — Z76.89 ESTABLISHING CARE WITH NEW DOCTOR, ENCOUNTER FOR: Status: ACTIVE | Noted: 2024-12-02

## 2025-02-20 ENCOUNTER — OFFICE VISIT (OUTPATIENT)
Dept: HEPATOLOGY | Facility: CLINIC | Age: 70
End: 2025-02-20
Payer: COMMERCIAL

## 2025-02-20 ENCOUNTER — LAB VISIT (OUTPATIENT)
Dept: LAB | Facility: HOSPITAL | Age: 70
End: 2025-02-20
Attending: STUDENT IN AN ORGANIZED HEALTH CARE EDUCATION/TRAINING PROGRAM
Payer: COMMERCIAL

## 2025-02-20 VITALS
HEART RATE: 94 BPM | OXYGEN SATURATION: 100 % | SYSTOLIC BLOOD PRESSURE: 155 MMHG | RESPIRATION RATE: 18 BRPM | DIASTOLIC BLOOD PRESSURE: 69 MMHG | BODY MASS INDEX: 38.72 KG/M2 | WEIGHT: 292.13 LBS | HEIGHT: 73 IN

## 2025-02-20 DIAGNOSIS — K70.31 ALCOHOLIC CIRRHOSIS OF LIVER WITH ASCITES: Primary | ICD-10-CM

## 2025-02-20 DIAGNOSIS — K70.31 ALCOHOLIC CIRRHOSIS OF LIVER WITH ASCITES: ICD-10-CM

## 2025-02-20 DIAGNOSIS — Z85.05 HISTORY OF HEPATOCELLULAR CARCINOMA: ICD-10-CM

## 2025-02-20 LAB
AFP SERPL-MCNC: 2.3 NG/ML (ref 0–8.4)
ALBUMIN SERPL BCP-MCNC: 2.3 G/DL (ref 3.5–5.2)
ALP SERPL-CCNC: 88 U/L (ref 40–150)
ALT SERPL W/O P-5'-P-CCNC: 15 U/L (ref 10–44)
ANION GAP SERPL CALC-SCNC: 9 MMOL/L (ref 8–16)
AST SERPL-CCNC: 41 U/L (ref 10–40)
BASOPHILS # BLD AUTO: 0.03 K/UL (ref 0–0.2)
BASOPHILS NFR BLD: 0.5 % (ref 0–1.9)
BILIRUB SERPL-MCNC: 4 MG/DL (ref 0.1–1)
BUN SERPL-MCNC: 6 MG/DL (ref 8–23)
CALCIUM SERPL-MCNC: 8.2 MG/DL (ref 8.7–10.5)
CHLORIDE SERPL-SCNC: 99 MMOL/L (ref 95–110)
CO2 SERPL-SCNC: 29 MMOL/L (ref 23–29)
CREAT SERPL-MCNC: 1.8 MG/DL (ref 0.5–1.4)
DIFFERENTIAL METHOD BLD: ABNORMAL
EOSINOPHIL # BLD AUTO: 0.1 K/UL (ref 0–0.5)
EOSINOPHIL NFR BLD: 2.2 % (ref 0–8)
ERYTHROCYTE [DISTWIDTH] IN BLOOD BY AUTOMATED COUNT: 18.6 % (ref 11.5–14.5)
EST. GFR  (NO RACE VARIABLE): 39.7 ML/MIN/1.73 M^2
GLUCOSE SERPL-MCNC: 127 MG/DL (ref 70–110)
HCT VFR BLD AUTO: 33.8 % (ref 40–54)
HGB BLD-MCNC: 10.5 G/DL (ref 14–18)
IMM GRANULOCYTES # BLD AUTO: 0.01 K/UL (ref 0–0.04)
IMM GRANULOCYTES NFR BLD AUTO: 0.2 % (ref 0–0.5)
INR PPP: 2 (ref 0.8–1.2)
LYMPHOCYTES # BLD AUTO: 1.2 K/UL (ref 1–4.8)
LYMPHOCYTES NFR BLD: 19.8 % (ref 18–48)
MCH RBC QN AUTO: 28.8 PG (ref 27–31)
MCHC RBC AUTO-ENTMCNC: 31.1 G/DL (ref 32–36)
MCV RBC AUTO: 93 FL (ref 82–98)
MONOCYTES # BLD AUTO: 1 K/UL (ref 0.3–1)
MONOCYTES NFR BLD: 17.4 % (ref 4–15)
NEUTROPHILS # BLD AUTO: 3.5 K/UL (ref 1.8–7.7)
NEUTROPHILS NFR BLD: 59.9 % (ref 38–73)
NRBC BLD-RTO: 0 /100 WBC
PLATELET # BLD AUTO: 82 K/UL (ref 150–450)
PMV BLD AUTO: 11.3 FL (ref 9.2–12.9)
POTASSIUM SERPL-SCNC: 2.8 MMOL/L (ref 3.5–5.1)
PROT SERPL-MCNC: 7 G/DL (ref 6–8.4)
PROTHROMBIN TIME: 20.7 SEC (ref 9–12.5)
RBC # BLD AUTO: 3.64 M/UL (ref 4.6–6.2)
SODIUM SERPL-SCNC: 137 MMOL/L (ref 136–145)
WBC # BLD AUTO: 5.82 K/UL (ref 3.9–12.7)

## 2025-02-20 PROCEDURE — 85025 COMPLETE CBC W/AUTO DIFF WBC: CPT | Performed by: STUDENT IN AN ORGANIZED HEALTH CARE EDUCATION/TRAINING PROGRAM

## 2025-02-20 PROCEDURE — 99215 OFFICE O/P EST HI 40 MIN: CPT | Mod: S$GLB,,, | Performed by: STUDENT IN AN ORGANIZED HEALTH CARE EDUCATION/TRAINING PROGRAM

## 2025-02-20 PROCEDURE — 3078F DIAST BP <80 MM HG: CPT | Mod: CPTII,S$GLB,, | Performed by: STUDENT IN AN ORGANIZED HEALTH CARE EDUCATION/TRAINING PROGRAM

## 2025-02-20 PROCEDURE — 3008F BODY MASS INDEX DOCD: CPT | Mod: CPTII,S$GLB,, | Performed by: STUDENT IN AN ORGANIZED HEALTH CARE EDUCATION/TRAINING PROGRAM

## 2025-02-20 PROCEDURE — 1100F PTFALLS ASSESS-DOCD GE2>/YR: CPT | Mod: CPTII,S$GLB,, | Performed by: STUDENT IN AN ORGANIZED HEALTH CARE EDUCATION/TRAINING PROGRAM

## 2025-02-20 PROCEDURE — 80053 COMPREHEN METABOLIC PANEL: CPT | Performed by: STUDENT IN AN ORGANIZED HEALTH CARE EDUCATION/TRAINING PROGRAM

## 2025-02-20 PROCEDURE — 99999 PR PBB SHADOW E&M-EST. PATIENT-LVL V: CPT | Mod: PBBFAC,,, | Performed by: STUDENT IN AN ORGANIZED HEALTH CARE EDUCATION/TRAINING PROGRAM

## 2025-02-20 PROCEDURE — 36415 COLL VENOUS BLD VENIPUNCTURE: CPT | Performed by: STUDENT IN AN ORGANIZED HEALTH CARE EDUCATION/TRAINING PROGRAM

## 2025-02-20 PROCEDURE — 80321 ALCOHOLS BIOMARKERS 1OR 2: CPT | Performed by: STUDENT IN AN ORGANIZED HEALTH CARE EDUCATION/TRAINING PROGRAM

## 2025-02-20 PROCEDURE — 3077F SYST BP >= 140 MM HG: CPT | Mod: CPTII,S$GLB,, | Performed by: STUDENT IN AN ORGANIZED HEALTH CARE EDUCATION/TRAINING PROGRAM

## 2025-02-20 PROCEDURE — 85610 PROTHROMBIN TIME: CPT | Performed by: STUDENT IN AN ORGANIZED HEALTH CARE EDUCATION/TRAINING PROGRAM

## 2025-02-20 PROCEDURE — 82105 ALPHA-FETOPROTEIN SERUM: CPT | Performed by: STUDENT IN AN ORGANIZED HEALTH CARE EDUCATION/TRAINING PROGRAM

## 2025-02-20 PROCEDURE — 3288F FALL RISK ASSESSMENT DOCD: CPT | Mod: CPTII,S$GLB,, | Performed by: STUDENT IN AN ORGANIZED HEALTH CARE EDUCATION/TRAINING PROGRAM

## 2025-02-20 PROCEDURE — 1126F AMNT PAIN NOTED NONE PRSNT: CPT | Mod: CPTII,S$GLB,, | Performed by: STUDENT IN AN ORGANIZED HEALTH CARE EDUCATION/TRAINING PROGRAM

## 2025-02-20 RX ORDER — TAMSULOSIN HYDROCHLORIDE 0.4 MG/1
CAPSULE ORAL
Status: ON HOLD | COMMUNITY
Start: 2025-01-16

## 2025-02-20 RX ORDER — POTASSIUM CHLORIDE 1500 MG/1
TABLET, EXTENDED RELEASE ORAL
Status: ON HOLD | COMMUNITY
Start: 2024-10-18

## 2025-02-20 NOTE — PROGRESS NOTES
Hepatology Follow Up Note    Referring provider: Dr. Giovani Wheat  PCP: Joao Villegas III, MD    Chief complaint: follow up etoh cirrhosis    HPI:  Juan Carlos Yoo Sr. is a 69 y.o. male with history of alcohol related who presents for hospital follow up.    He has been followed by Dr. Cota. He had a prolonged hospitalization November-December 2024. He initially presented with volume overload.  Hospital course was complicated by JAE, bacteremia, cellulitis, ileus.  Despite aggressive medical treatment, he ultimately required dialysis which he has continued as an outpatient.  He reports overall doing well since discharge.  Functional status is improving.  Compliant with lactulose without recent encephalopathy.  He denies other signs of decompensated cirrhosis including no recent abdominal distention, jaundice, GI bleeding.    Background - Dr. Cota  HPI:  This is a 68 y.o. male here for follow-up of: decompensated cirrhosis.     Interval History 3/26/24:  Patient states sometimes when he stands up, he feels dizzy.    Orthostatics now in clinic:  Sitting  /58, Pulse 90/min  Standing /53, Pulse 93/min     Stop amlodipine, and amiloride (no edema LE).  Continue nadolol 40 mg every evening.      MRI w wo 3/24/24:  Liver: Cirrhotic morphology.  Post treatment changes of radioembolization.  Stable size ablation cavity at the hepatic dome measuring approximately 5.2 x 3.4 cm (series 12, image 19), similar to prior exam allowing for differences in measurement technique.  Peripheral enhancement surrounds the ablation cavity which progresses on venous and delayed phases consistent with scarring/fibrosis.  No evidence of local residual/recurrent disease.  Previously described indeterminate subcentimeter focus of arterial enhancement in the right hepatic lobe is not visualized on today's exam.  No new suspicious enhancing lesion.  Portal veins are patent.     AFP 4.4  Hgb 11/1.         Interval history:  "10/2023  he is not doing well.  Has slowly gained approx 10 lb between May and Oct [id number], but then escalated 30 lb (I seriously doubt it) in 2.5 months. Of which, 27 lb lost in 20 days.  These weights may not be accurate.  His left upper extremity is generally bigger than right arm.  Both breasts are enlarged, most likely due to spironolactone.- will switch to amiloride  will recommending d/C amlodipine.      His abdomen has no pain, no swelling of his feet/ankles.      MRI abd 10/18/23:  -  Stable post treatment changes of radioembolization (4.3 x 3.6 cm ablation cavity) for hepatocellular carcinoma.  No evidence of local recurrence.  -  Indeterminate subcentimeter focus of arterial enhancement in the right hepatic lobe without washout or pseudo capsule.  -  Cirrhotic liver morphology.     Will get CBC, CMP, PT INR, AFP, MRI w wo contrast in 3 months (January 18/2024).      Creatinine, LFTs normal,   T bili 1.4.        Meds:   Spirono 50/d, lasix 40/d, nadolol 40 mg HS, eliquis 5 mg BID for AFib (check with cardiologist what to do when needs dental implant), proronix 40 mg/d, amlodipine 10/d, tamsulosin 0.4 mg/d.       Continue above meds. Excpet in prep for dental implant will need to be off eliquis, patient will check with cardiologist - maybe lovenox bridge.      Interval History: 4/2023   Patient completed transplant eval and was presented to selection committee, however, he was declined due to persistent positive PETH tests, high BMI. Committee recommended having liver lesion treated.  Has appointment with IR on June 6, 2023 to have mapping for the lesion to be treated with Y-90.        Today, Mr. Yoo states he has lost 5 lbs.  Balance is "not so good".       Decomp cirrhosis with fatigue, hepatic encephalopathy, thrombocytopenia, eso varices wo bleeding.  EGD and colonoscopy were supposed to be done 2 months ago, he was prepped, and anesthesia did not want to go forward, he was sent to ECHO: A Fib " "cardioverted 3 times did not stop the arrhythmia, but in the ER he got a shot, and heart converted to sinus rhythm, no more A Fib since.       Past Medical History:   Diagnosis Date    Atrial fibrillation     Bulging disc     Cirrhosis     Colon polyp 12/29/2017    Rpt 5 yrs    EV (esophageal varices)     Hypertension 3/30/2023    Skin cancer 03/2017    Thrombocytopenia        Past Surgical History:   Procedure Laterality Date    CATHETERIZATION OF BOTH LEFT AND RIGHT HEART N/A 2/8/2023    Procedure: CATHETERIZATION, HEART, BOTH LEFT AND RIGHT;  Surgeon: Flo Malone MD;  Location: [facility] CATH LAB;  Service: Cardiology;  Laterality: N/A;  low bleeding risk 1.0%    CHOLECYSTECTOMY      COLONOSCOPY      COLONOSCOPY N/A 12/29/2017    ta rpt 2022    CORONARY ANGIOGRAPHY N/A 2/8/2023    Procedure: ANGIOGRAM, CORONARY ARTERY;  Surgeon: Flo Malone MD;  Location: [facility] CATH LAB;  Service: Cardiology;  Laterality: N/A;    HERNIA REPAIR      SKIN BIOPSY  03/2017    TONSILLECTOMY      UPPER GASTROINTESTINAL ENDOSCOPY  2011    EV    UPPER GASTROINTESTINAL ENDOSCOPY  2016    VASECTOMY         Family History   Problem Relation Name Age of Onset    Ovarian cancer Sister      Hyperlipidemia Brother      Cancer Maternal Uncle          Leukemia    Heart disease Maternal Uncle      Colon cancer Neg Hx         Social History[1]    Current Medications[2]    Review of patient's allergies indicates:  No Known Allergies    Review of Systems   Constitutional:  Negative for fever and weight loss.   Cardiovascular:  Negative for leg swelling.   Gastrointestinal:  Negative for abdominal pain, blood in stool, constipation, diarrhea, heartburn, melena, nausea and vomiting.       Vitals:    02/20/25 0916   BP: (!) 155/69   Pulse: 94   Resp: 18   SpO2: 100%   Weight: 132.5 kg (292 lb 1.8 oz)   Height: 6' 1" (1.854 m)       Physical Exam  Constitutional:       General: He is not in acute distress.  Eyes:      General: No scleral " icterus.  Cardiovascular:      Rate and Rhythm: Normal rate and regular rhythm.   Pulmonary:      Effort: Pulmonary effort is normal. No respiratory distress.   Abdominal:      General: Bowel sounds are normal. There is no distension.      Palpations: Abdomen is soft.      Tenderness: There is no abdominal tenderness. There is no guarding or rebound.   Musculoskeletal:      Right lower leg: No edema.      Left lower leg: No edema.   Skin:     Coloration: Skin is not jaundiced.         LABS: I personally reviewed pertinent laboratory findings.    Lab Results   Component Value Date    WBC 7.43 12/30/2024    HGB 8.4 (L) 02/04/2025    HCT 23.4 (L) 12/30/2024    MCV 88 12/30/2024    PLT 78 (L) 12/30/2024       Lab Results   Component Value Date     (L) 01/20/2025    K 3.4 (L) 01/20/2025    CL 99 01/20/2025    CO2 24 01/20/2025    BUN 19 02/06/2025    CREATININE 2.2 (H) 01/20/2025    CALCIUM 8.9 01/20/2025    ANIONGAP 11 01/20/2025    ESTGFRAFRICA >60.0 06/13/2022    EGFRNONAA >60.0 06/13/2022       Lab Results   Component Value Date    ALT 12 01/20/2025    AST 34 01/20/2025    GGT 30 12/13/2022    ALKPHOS 73 01/20/2025    BILITOT 2.9 (H) 01/20/2025       Lab Results   Component Value Date    HEPAIGM Non-reactive 12/19/2024    HEPBCAB Non-reactive 12/19/2024    HEPCAB Non-reactive 11/20/2024       Lab Results   Component Value Date    SMOOTHMUSCAB Positive 1:40 (A) 06/05/2023    CERULOPLSM 5.0 (L) 12/13/2022        MELD 3.0: 29 at 12/30/2024  6:14 AM  MELD-Na: 30 at 12/30/2024  6:14 AM  Calculated from:  Serum Creatinine: On dialysis. Using the maximum value.  Serum Sodium: 131 mmol/L at 12/30/2024  6:14 AM  Total Bilirubin: 2.2 mg/dL at 12/30/2024  6:14 AM  Serum Albumin: 2.1 g/dL at 12/30/2024  6:14 AM  INR(ratio): 1.5 at 12/30/2024  6:14 AM  Age at listing (hypothetical): 69 years  Sex: Male at 12/30/2024  6:14 AM       IMAGING: I personally reviewed imaging studies.      Assessment:  69 y.o. 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    1. Alcoholic cirrhosis of liver with ascites    2. History of hepatocellular carcinoma        Recommendations:  Cirrhosis due to alcohol: Congratulated on alcohol cessation and counseled on continued abstinence.    HCC:  He is status post Y90 06/2023.  Obtain triple phase CT and AFP for surveillance.    Ascites/Edema: 2 gm Na diet. Volume removal with dialysis.    Encephalopathy: Continue lactulose. Titrate to maintain 3-4 bowel movements per day.    Transplant candidacy:  He was previously declined for liver transplantation due to ongoing alcohol use and high BMI. His MELD is currently 30 though partially driven by renal dysfunction. Will discuss his case with his primary hepatologist to consider repeating transplant evaluation.    Cirrhosis HM  - Variceal screening: EGD ordered today.    - Immunizations: Recommend HAV and HBV vaccinations if not immune.    Return to clinic in 2 months.    I spent a total of 40 minutes on the day of the visit. This includes face to face time and non-face to face time preparing to see the patient (eg, review of tests), obtaining and/or reviewing separately obtained history, documenting clinical information in the electronic or other health record, independently interpreting results, and communicating results to the patient/family/caregiver, or care coordination.    This note includes dictation done using M*[employer] speech recognition program. Word recognition mistakes are occasionally missed on review.    Eliud Zamora MD  Staff Physician  Hepatology and Liver Transplant  [facility]  [facility] [place]         [1]   Social History  Tobacco Use    Smoking status: Never    Smokeless tobacco: Never   Substance Use Topics    Alcohol use: Not Currently     Alcohol/week: 0.0 standard drinks of alcohol    Drug use: No   [2]   Current Outpatient Medications   Medication Sig Dispense Refill    famotidine  (PEPCID) 20 MG tablet TAKE 1 TABLET BY MOUTH EVERY OTHER DAY 45 tablet 1    furosemide (LASIX) 80 MG tablet Take 80 mg by mouth every Tuesday, Thursday, Saturday, Sunday.      lactulose (CHRONULAC) 20 gram/30 mL Soln Take 45 mLs (30 g total) by mouth 3 (three) times daily. TITRATE TO 3-4 BOWEL MOVEMENTS DAILY. 4050 mL 0    metoprolol tartrate (LOPRESSOR) 25 MG tablet TAKE 0.5 TABLETS BY MOUTH 2 TIMES DAILY. 90 tablet 0    midodrine (PROAMATINE) 5 MG Tab Take 3 tablets (15 mg total) by mouth every 8 (eight) hours. 90 tablet 2     No current facility-administered medications for this visit.

## 2025-02-21 ENCOUNTER — HOSPITAL ENCOUNTER (INPATIENT)
Facility: HOSPITAL | Age: 70
LOS: 11 days | Discharge: HOME HEALTH | DRG: 602 | End: 2025-03-05
Attending: EMERGENCY MEDICINE | Admitting: INTERNAL MEDICINE
Payer: COMMERCIAL

## 2025-02-21 DIAGNOSIS — B34.8 RHINOVIRUS INFECTION: Primary | ICD-10-CM

## 2025-02-21 DIAGNOSIS — B34.8 RHINOVIRUS: ICD-10-CM

## 2025-02-21 DIAGNOSIS — R06.02 SOB (SHORTNESS OF BREATH): ICD-10-CM

## 2025-02-21 DIAGNOSIS — L02.415 ABSCESS OF RIGHT THIGH: ICD-10-CM

## 2025-02-21 DIAGNOSIS — N18.4 CKD (CHRONIC KIDNEY DISEASE) STAGE 4, GFR 15-29 ML/MIN: ICD-10-CM

## 2025-02-21 DIAGNOSIS — R53.81 DEBILITY: ICD-10-CM

## 2025-02-21 DIAGNOSIS — E87.6 HYPOKALEMIA: ICD-10-CM

## 2025-02-21 LAB
ABO + RH BLD: NORMAL
ADENOVIRUS: NOT DETECTED
ALBUMIN SERPL BCP-MCNC: 2.5 G/DL (ref 3.5–5.2)
ALP SERPL-CCNC: 92 U/L (ref 55–135)
ALT SERPL W/O P-5'-P-CCNC: 16 U/L (ref 10–44)
AMMONIA PLAS-SCNC: 38 UMOL/L (ref 10–50)
ANION GAP SERPL CALC-SCNC: 10 MMOL/L (ref 8–16)
AST SERPL-CCNC: 44 U/L (ref 10–40)
BASOPHILS # BLD AUTO: 0.05 K/UL (ref 0–0.2)
BASOPHILS NFR BLD: 0.6 % (ref 0–1.9)
BILIRUB SERPL-MCNC: 6.6 MG/DL (ref 0.1–1)
BLD GP AB SCN CELLS X3 SERPL QL: NORMAL
BORDETELLA PARAPERTUSSIS (IS1001): NOT DETECTED
BORDETELLA PERTUSSIS (PTXP): NOT DETECTED
BUN SERPL-MCNC: 5 MG/DL (ref 8–23)
CALCIUM SERPL-MCNC: 8 MG/DL (ref 8.7–10.5)
CHLAMYDIA PNEUMONIAE: NOT DETECTED
CHLORIDE SERPL-SCNC: 97 MMOL/L (ref 95–110)
CO2 SERPL-SCNC: 28 MMOL/L (ref 23–29)
CORONAVIRUS 229E, COMMON COLD VIRUS: NOT DETECTED
CORONAVIRUS HKU1, COMMON COLD VIRUS: NOT DETECTED
CORONAVIRUS NL63, COMMON COLD VIRUS: NOT DETECTED
CORONAVIRUS OC43, COMMON COLD VIRUS: NOT DETECTED
CREAT SERPL-MCNC: 1.4 MG/DL (ref 0.5–1.4)
DIFFERENTIAL METHOD BLD: ABNORMAL
EOSINOPHIL # BLD AUTO: 0.1 K/UL (ref 0–0.5)
EOSINOPHIL NFR BLD: 0.8 % (ref 0–8)
ERYTHROCYTE [DISTWIDTH] IN BLOOD BY AUTOMATED COUNT: 18.8 % (ref 11.5–14.5)
EST. GFR  (NO RACE VARIABLE): 53.7 ML/MIN/1.73 M^2
FLUBV RNA NPH QL NAA+NON-PROBE: NOT DETECTED
GLUCOSE SERPL-MCNC: 107 MG/DL (ref 70–110)
HCT VFR BLD AUTO: 36.4 % (ref 40–54)
HGB BLD-MCNC: 11.4 G/DL (ref 14–18)
HPIV1 RNA NPH QL NAA+NON-PROBE: NOT DETECTED
HPIV2 RNA NPH QL NAA+NON-PROBE: NOT DETECTED
HPIV3 RNA NPH QL NAA+NON-PROBE: NOT DETECTED
HPIV4 RNA NPH QL NAA+NON-PROBE: NOT DETECTED
HUMAN METAPNEUMOVIRUS: NOT DETECTED
IMM GRANULOCYTES # BLD AUTO: 0.02 K/UL (ref 0–0.04)
IMM GRANULOCYTES NFR BLD AUTO: 0.2 % (ref 0–0.5)
INFLUENZA A: NOT DETECTED
INR PPP: 1.9 (ref 0.8–1.2)
LYMPHOCYTES # BLD AUTO: 0.8 K/UL (ref 1–4.8)
LYMPHOCYTES NFR BLD: 8.8 % (ref 18–48)
MCH RBC QN AUTO: 29.3 PG (ref 27–31)
MCHC RBC AUTO-ENTMCNC: 31.3 G/DL (ref 32–36)
MCV RBC AUTO: 94 FL (ref 82–98)
MONOCYTES # BLD AUTO: 1.1 K/UL (ref 0.3–1)
MONOCYTES NFR BLD: 12.8 % (ref 4–15)
MYCOPLASMA PNEUMONIAE: NOT DETECTED
NEUTROPHILS # BLD AUTO: 6.6 K/UL (ref 1.8–7.7)
NEUTROPHILS NFR BLD: 76.8 % (ref 38–73)
NRBC BLD-RTO: 0 /100 WBC
PLATELET # BLD AUTO: 82 K/UL (ref 150–450)
PMV BLD AUTO: 12.6 FL (ref 9.2–12.9)
POTASSIUM SERPL-SCNC: 2.8 MMOL/L (ref 3.5–5.1)
PROT SERPL-MCNC: 7.7 G/DL (ref 6–8.4)
PROTHROMBIN TIME: 20.3 SEC (ref 9–12.5)
RBC # BLD AUTO: 3.89 M/UL (ref 4.6–6.2)
RESPIRATORY INFECTION PANEL SOURCE: ABNORMAL
RSV RNA NPH QL NAA+NON-PROBE: NOT DETECTED
RV+EV RNA NPH QL NAA+NON-PROBE: DETECTED
SARS-COV-2 RNA RESP QL NAA+PROBE: NOT DETECTED
SODIUM SERPL-SCNC: 135 MMOL/L (ref 136–145)
SPECIMEN OUTDATE: NORMAL
WBC # BLD AUTO: 8.61 K/UL (ref 3.9–12.7)

## 2025-02-21 PROCEDURE — 99900031 HC PATIENT EDUCATION (STAT)

## 2025-02-21 PROCEDURE — 94761 N-INVAS EAR/PLS OXIMETRY MLT: CPT

## 2025-02-21 PROCEDURE — 99900035 HC TECH TIME PER 15 MIN (STAT)

## 2025-02-21 PROCEDURE — 80053 COMPREHEN METABOLIC PANEL: CPT | Performed by: EMERGENCY MEDICINE

## 2025-02-21 PROCEDURE — 86850 RBC ANTIBODY SCREEN: CPT | Performed by: EMERGENCY MEDICINE

## 2025-02-21 PROCEDURE — 85610 PROTHROMBIN TIME: CPT | Performed by: EMERGENCY MEDICINE

## 2025-02-21 PROCEDURE — 85025 COMPLETE CBC W/AUTO DIFF WBC: CPT | Performed by: EMERGENCY MEDICINE

## 2025-02-21 PROCEDURE — 94640 AIRWAY INHALATION TREATMENT: CPT

## 2025-02-21 PROCEDURE — 25000242 PHARM REV CODE 250 ALT 637 W/ HCPCS: Performed by: EMERGENCY MEDICINE

## 2025-02-21 PROCEDURE — 93010 ELECTROCARDIOGRAM REPORT: CPT | Mod: ,,, | Performed by: INTERNAL MEDICINE

## 2025-02-21 PROCEDURE — 27000221 HC OXYGEN, UP TO 24 HOURS

## 2025-02-21 PROCEDURE — 82140 ASSAY OF AMMONIA: CPT | Performed by: EMERGENCY MEDICINE

## 2025-02-21 PROCEDURE — G0378 HOSPITAL OBSERVATION PER HR: HCPCS

## 2025-02-21 PROCEDURE — 99285 EMERGENCY DEPT VISIT HI MDM: CPT | Mod: 25

## 2025-02-21 PROCEDURE — 94640 AIRWAY INHALATION TREATMENT: CPT | Mod: XB

## 2025-02-21 PROCEDURE — 93005 ELECTROCARDIOGRAM TRACING: CPT

## 2025-02-21 PROCEDURE — 94799 UNLISTED PULMONARY SVC/PX: CPT

## 2025-02-21 PROCEDURE — 0202U NFCT DS 22 TRGT SARS-COV-2: CPT | Performed by: EMERGENCY MEDICINE

## 2025-02-21 PROCEDURE — 36415 COLL VENOUS BLD VENIPUNCTURE: CPT | Performed by: EMERGENCY MEDICINE

## 2025-02-21 RX ORDER — FAMOTIDINE 20 MG/1
20 TABLET, FILM COATED ORAL EVERY OTHER DAY
Status: DISCONTINUED | OUTPATIENT
Start: 2025-02-22 | End: 2025-02-23

## 2025-02-21 RX ORDER — MIDODRINE HYDROCHLORIDE 5 MG/1
15 TABLET ORAL EVERY 8 HOURS
Status: DISCONTINUED | OUTPATIENT
Start: 2025-02-22 | End: 2025-02-22

## 2025-02-21 RX ORDER — LEVALBUTEROL INHALATION SOLUTION 0.63 MG/3ML
0.63 SOLUTION RESPIRATORY (INHALATION)
Status: DISCONTINUED | OUTPATIENT
Start: 2025-02-21 | End: 2025-02-22

## 2025-02-21 RX ORDER — ONDANSETRON HYDROCHLORIDE 2 MG/ML
4 INJECTION, SOLUTION INTRAVENOUS EVERY 8 HOURS PRN
Status: DISCONTINUED | OUTPATIENT
Start: 2025-02-21 | End: 2025-03-05 | Stop reason: HOSPADM

## 2025-02-21 RX ORDER — TAMSULOSIN HYDROCHLORIDE 0.4 MG/1
0.4 CAPSULE ORAL DAILY
Status: DISCONTINUED | OUTPATIENT
Start: 2025-02-22 | End: 2025-03-05 | Stop reason: HOSPADM

## 2025-02-21 RX ORDER — SODIUM CHLORIDE 0.9 % (FLUSH) 0.9 %
10 SYRINGE (ML) INJECTION
Status: DISCONTINUED | OUTPATIENT
Start: 2025-02-21 | End: 2025-03-05 | Stop reason: HOSPADM

## 2025-02-21 RX ORDER — LEVALBUTEROL INHALATION SOLUTION 0.63 MG/3ML
0.63 SOLUTION RESPIRATORY (INHALATION)
Status: COMPLETED | OUTPATIENT
Start: 2025-02-21 | End: 2025-02-21

## 2025-02-21 RX ORDER — FUROSEMIDE 40 MG/1
80 TABLET ORAL
Status: DISCONTINUED | OUTPATIENT
Start: 2025-02-22 | End: 2025-02-23

## 2025-02-21 RX ORDER — TALC
6 POWDER (GRAM) TOPICAL NIGHTLY PRN
Status: DISCONTINUED | OUTPATIENT
Start: 2025-02-21 | End: 2025-03-05 | Stop reason: HOSPADM

## 2025-02-21 RX ORDER — METOPROLOL TARTRATE 25 MG/1
25 TABLET, FILM COATED ORAL 2 TIMES DAILY
Status: DISCONTINUED | OUTPATIENT
Start: 2025-02-22 | End: 2025-03-05 | Stop reason: HOSPADM

## 2025-02-21 RX ADMIN — LEVALBUTEROL HYDROCHLORIDE 0.63 MG: 0.63 SOLUTION RESPIRATORY (INHALATION) at 09:02

## 2025-02-21 RX ADMIN — LEVALBUTEROL HYDROCHLORIDE 0.63 MG: 0.63 SOLUTION RESPIRATORY (INHALATION) at 06:02

## 2025-02-22 PROBLEM — B34.8 RHINOVIRUS INFECTION: Status: ACTIVE | Noted: 2025-02-22

## 2025-02-22 PROBLEM — K76.82 HEPATIC ENCEPHALOPATHY: Status: ACTIVE | Noted: 2024-11-21

## 2025-02-22 LAB
ALBUMIN SERPL BCP-MCNC: 2 G/DL (ref 3.5–5.2)
ALP SERPL-CCNC: 75 U/L (ref 55–135)
ALT SERPL W/O P-5'-P-CCNC: 14 U/L (ref 10–44)
ANION GAP SERPL CALC-SCNC: 8 MMOL/L (ref 8–16)
AST SERPL-CCNC: 32 U/L (ref 10–40)
BACTERIA #/AREA URNS HPF: ABNORMAL /HPF
BASOPHILS # BLD AUTO: 0.05 K/UL (ref 0–0.2)
BASOPHILS NFR BLD: 0.8 % (ref 0–1.9)
BILIRUB SERPL-MCNC: 5.6 MG/DL (ref 0.1–1)
BILIRUB UR QL STRIP: ABNORMAL
BUN SERPL-MCNC: 7 MG/DL (ref 8–23)
CALCIUM SERPL-MCNC: 8 MG/DL (ref 8.7–10.5)
CHLORIDE SERPL-SCNC: 98 MMOL/L (ref 95–110)
CLARITY UR: ABNORMAL
CO2 SERPL-SCNC: 30 MMOL/L (ref 23–29)
COLOR UR: YELLOW
CREAT SERPL-MCNC: 1.7 MG/DL (ref 0.5–1.4)
DIFFERENTIAL METHOD BLD: ABNORMAL
EOSINOPHIL # BLD AUTO: 0.2 K/UL (ref 0–0.5)
EOSINOPHIL NFR BLD: 3.8 % (ref 0–8)
ERYTHROCYTE [DISTWIDTH] IN BLOOD BY AUTOMATED COUNT: 18.7 % (ref 11.5–14.5)
EST. GFR  (NO RACE VARIABLE): 42.6 ML/MIN/1.73 M^2
GLUCOSE SERPL-MCNC: 98 MG/DL (ref 70–110)
GLUCOSE UR QL STRIP: NEGATIVE
HCT VFR BLD AUTO: 31.5 % (ref 40–54)
HGB BLD-MCNC: 10.2 G/DL (ref 14–18)
HGB UR QL STRIP: ABNORMAL
HYALINE CASTS #/AREA URNS LPF: 10.3 /LPF
IMM GRANULOCYTES # BLD AUTO: 0.02 K/UL (ref 0–0.04)
IMM GRANULOCYTES NFR BLD AUTO: 0.3 % (ref 0–0.5)
KETONES UR QL STRIP: NEGATIVE
LEUKOCYTE ESTERASE UR QL STRIP: ABNORMAL
LYMPHOCYTES # BLD AUTO: 0.9 K/UL (ref 1–4.8)
LYMPHOCYTES NFR BLD: 14.7 % (ref 18–48)
MAGNESIUM SERPL-MCNC: 1.5 MG/DL (ref 1.6–2.6)
MCH RBC QN AUTO: 30.3 PG (ref 27–31)
MCHC RBC AUTO-ENTMCNC: 32.4 G/DL (ref 32–36)
MCV RBC AUTO: 94 FL (ref 82–98)
MICROSCOPIC COMMENT: ABNORMAL
MONOCYTES # BLD AUTO: 1.3 K/UL (ref 0.3–1)
MONOCYTES NFR BLD: 21.5 % (ref 4–15)
NEUTROPHILS # BLD AUTO: 3.6 K/UL (ref 1.8–7.7)
NEUTROPHILS NFR BLD: 58.9 % (ref 38–73)
NITRITE UR QL STRIP: POSITIVE
NRBC BLD-RTO: 0 /100 WBC
OHS QRS DURATION: 90 MS
OHS QTC CALCULATION: 551 MS
OTHER ELEMENTS URNS MICRO: ABNORMAL
PH UR STRIP: 6 [PH] (ref 5–8)
PLATELET # BLD AUTO: 57 K/UL (ref 150–450)
PMV BLD AUTO: 12.6 FL (ref 9.2–12.9)
POTASSIUM SERPL-SCNC: 2.8 MMOL/L (ref 3.5–5.1)
PROT SERPL-MCNC: 6.3 G/DL (ref 6–8.4)
PROT UR QL STRIP: ABNORMAL
RBC # BLD AUTO: 3.37 M/UL (ref 4.6–6.2)
RBC #/AREA URNS HPF: 23 /HPF (ref 0–4)
SODIUM SERPL-SCNC: 136 MMOL/L (ref 136–145)
SP GR UR STRIP: 1.01 (ref 1–1.03)
SQUAMOUS #/AREA URNS HPF: 1 /HPF
URN SPEC COLLECT METH UR: ABNORMAL
UROBILINOGEN UR STRIP-ACNC: NEGATIVE EU/DL
WBC # BLD AUTO: 6.04 K/UL (ref 3.9–12.7)
WBC #/AREA URNS HPF: >100 /HPF (ref 0–5)
YEAST URNS QL MICRO: ABNORMAL

## 2025-02-22 PROCEDURE — 87086 URINE CULTURE/COLONY COUNT: CPT | Performed by: EMERGENCY MEDICINE

## 2025-02-22 PROCEDURE — 36415 COLL VENOUS BLD VENIPUNCTURE: CPT | Performed by: EMERGENCY MEDICINE

## 2025-02-22 PROCEDURE — 25000003 PHARM REV CODE 250: Performed by: INTERNAL MEDICINE

## 2025-02-22 PROCEDURE — 85025 COMPLETE CBC W/AUTO DIFF WBC: CPT | Performed by: EMERGENCY MEDICINE

## 2025-02-22 PROCEDURE — 11000001 HC ACUTE MED/SURG PRIVATE ROOM

## 2025-02-22 PROCEDURE — 27000207 HC ISOLATION

## 2025-02-22 PROCEDURE — 94640 AIRWAY INHALATION TREATMENT: CPT | Mod: XB

## 2025-02-22 PROCEDURE — 63600175 PHARM REV CODE 636 W HCPCS: Performed by: INTERNAL MEDICINE

## 2025-02-22 PROCEDURE — 36415 COLL VENOUS BLD VENIPUNCTURE: CPT | Performed by: INTERNAL MEDICINE

## 2025-02-22 PROCEDURE — 27000221 HC OXYGEN, UP TO 24 HOURS

## 2025-02-22 PROCEDURE — 99900035 HC TECH TIME PER 15 MIN (STAT)

## 2025-02-22 PROCEDURE — 94761 N-INVAS EAR/PLS OXIMETRY MLT: CPT

## 2025-02-22 PROCEDURE — 25000242 PHARM REV CODE 250 ALT 637 W/ HCPCS: Performed by: INTERNAL MEDICINE

## 2025-02-22 PROCEDURE — 80053 COMPREHEN METABOLIC PANEL: CPT | Performed by: EMERGENCY MEDICINE

## 2025-02-22 PROCEDURE — 25000242 PHARM REV CODE 250 ALT 637 W/ HCPCS: Performed by: EMERGENCY MEDICINE

## 2025-02-22 PROCEDURE — 83735 ASSAY OF MAGNESIUM: CPT | Performed by: INTERNAL MEDICINE

## 2025-02-22 PROCEDURE — 99900031 HC PATIENT EDUCATION (STAT)

## 2025-02-22 PROCEDURE — 25000003 PHARM REV CODE 250: Performed by: EMERGENCY MEDICINE

## 2025-02-22 PROCEDURE — 81000 URINALYSIS NONAUTO W/SCOPE: CPT | Performed by: EMERGENCY MEDICINE

## 2025-02-22 RX ORDER — POTASSIUM CHLORIDE 7.45 MG/ML
10 INJECTION INTRAVENOUS
Status: COMPLETED | OUTPATIENT
Start: 2025-02-22 | End: 2025-02-22

## 2025-02-22 RX ORDER — LEVALBUTEROL INHALATION SOLUTION 0.63 MG/3ML
1.25 SOLUTION RESPIRATORY (INHALATION)
Status: DISCONTINUED | OUTPATIENT
Start: 2025-02-22 | End: 2025-02-22

## 2025-02-22 RX ORDER — MUPIROCIN 20 MG/G
OINTMENT TOPICAL 2 TIMES DAILY
Status: DISPENSED | OUTPATIENT
Start: 2025-02-22 | End: 2025-02-27

## 2025-02-22 RX ORDER — POTASSIUM CHLORIDE 20 MEQ/1
40 TABLET, EXTENDED RELEASE ORAL 2 TIMES DAILY
Status: DISCONTINUED | OUTPATIENT
Start: 2025-02-22 | End: 2025-02-23

## 2025-02-22 RX ORDER — TRAMADOL HYDROCHLORIDE 50 MG/1
50 TABLET ORAL EVERY 6 HOURS PRN
Status: DISCONTINUED | OUTPATIENT
Start: 2025-02-22 | End: 2025-03-05 | Stop reason: HOSPADM

## 2025-02-22 RX ORDER — POTASSIUM CHLORIDE 14.9 MG/ML
40 INJECTION INTRAVENOUS ONCE
Status: DISCONTINUED | OUTPATIENT
Start: 2025-02-22 | End: 2025-02-22

## 2025-02-22 RX ORDER — DICLOFENAC SODIUM 10 MG/G
4 GEL TOPICAL 3 TIMES DAILY
Status: DISCONTINUED | OUTPATIENT
Start: 2025-02-22 | End: 2025-02-22

## 2025-02-22 RX ORDER — LEVALBUTEROL 1.25 MG/.5ML
1.25 SOLUTION, CONCENTRATE RESPIRATORY (INHALATION)
Status: DISCONTINUED | OUTPATIENT
Start: 2025-02-22 | End: 2025-03-04

## 2025-02-22 RX ORDER — DICLOFENAC SODIUM 10 MG/G
4 GEL TOPICAL 3 TIMES DAILY
Status: DISCONTINUED | OUTPATIENT
Start: 2025-02-23 | End: 2025-03-05 | Stop reason: HOSPADM

## 2025-02-22 RX ORDER — LEVALBUTEROL INHALATION SOLUTION 0.63 MG/3ML
0.63 SOLUTION RESPIRATORY (INHALATION)
Status: DISCONTINUED | OUTPATIENT
Start: 2025-02-22 | End: 2025-02-22

## 2025-02-22 RX ORDER — MIDODRINE HYDROCHLORIDE 5 MG/1
10 TABLET ORAL EVERY 8 HOURS
Status: DISCONTINUED | OUTPATIENT
Start: 2025-02-22 | End: 2025-02-23

## 2025-02-22 RX ADMIN — LACTULOSE 30 G: 20 SOLUTION ORAL at 09:02

## 2025-02-22 RX ADMIN — POTASSIUM CHLORIDE 10 MEQ: 7.46 INJECTION, SOLUTION INTRAVENOUS at 10:02

## 2025-02-22 RX ADMIN — POTASSIUM CHLORIDE 40 MEQ: 1500 TABLET, EXTENDED RELEASE ORAL at 10:02

## 2025-02-22 RX ADMIN — LEVALBUTEROL 1.25 MG: 1.25 SOLUTION, CONCENTRATE RESPIRATORY (INHALATION) at 11:02

## 2025-02-22 RX ADMIN — TAMSULOSIN HYDROCHLORIDE 0.4 MG: 0.4 CAPSULE ORAL at 09:02

## 2025-02-22 RX ADMIN — LACTULOSE 30 G: 20 SOLUTION ORAL at 03:02

## 2025-02-22 RX ADMIN — MIDODRINE HYDROCHLORIDE 15 MG: 5 TABLET ORAL at 06:02

## 2025-02-22 RX ADMIN — FUROSEMIDE 80 MG: 40 TABLET ORAL at 05:02

## 2025-02-22 RX ADMIN — MIDODRINE HYDROCHLORIDE 10 MG: 5 TABLET ORAL at 09:02

## 2025-02-22 RX ADMIN — LEVALBUTEROL 1.25 MG: 1.25 SOLUTION, CONCENTRATE RESPIRATORY (INHALATION) at 07:02

## 2025-02-22 RX ADMIN — POTASSIUM CHLORIDE 40 MEQ: 1500 TABLET, EXTENDED RELEASE ORAL at 09:02

## 2025-02-22 RX ADMIN — LEVALBUTEROL HYDROCHLORIDE 0.63 MG: 0.63 SOLUTION RESPIRATORY (INHALATION) at 07:02

## 2025-02-22 RX ADMIN — TRAMADOL HYDROCHLORIDE 50 MG: 50 TABLET, COATED ORAL at 09:02

## 2025-02-22 RX ADMIN — MIDODRINE HYDROCHLORIDE 10 MG: 5 TABLET ORAL at 02:02

## 2025-02-22 RX ADMIN — LEVALBUTEROL 1.25 MG: 1.25 SOLUTION, CONCENTRATE RESPIRATORY (INHALATION) at 03:02

## 2025-02-22 RX ADMIN — POTASSIUM CHLORIDE 10 MEQ: 7.46 INJECTION, SOLUTION INTRAVENOUS at 11:02

## 2025-02-22 RX ADMIN — METOPROLOL TARTRATE 25 MG: 25 TABLET, FILM COATED ORAL at 09:02

## 2025-02-22 RX ADMIN — FAMOTIDINE 20 MG: 20 TABLET, FILM COATED ORAL at 09:02

## 2025-02-22 NOTE — PLAN OF CARE
Plan of care reviewed and ongoing with patient. 18 gauge IV to L AC saline locked, HD Catheter to R IJ, 2L NC tolerating well, urinal within reach. Droplet precautions maintained. Free of pain and complaints during the night. Call light and personal items within reach.       Problem: Adult Inpatient Plan of Care  Goal: Plan of Care Review  Outcome: Not Progressing  Goal: Patient-Specific Goal (Individualized)  Outcome: Not Progressing  Goal: Absence of Hospital-Acquired Illness or Injury  Outcome: Not Progressing  Goal: Optimal Comfort and Wellbeing  Outcome: Not Progressing  Goal: Readiness for Transition of Care  Outcome: Not Progressing     Problem: Acute Kidney Injury/Impairment  Goal: Fluid and Electrolyte Balance  Outcome: Not Progressing  Goal: Improved Oral Intake  Outcome: Not Progressing  Goal: Effective Renal Function  Outcome: Not Progressing     Problem: Infection  Goal: Absence of Infection Signs and Symptoms  Outcome: Not Progressing     Problem: Wound  Goal: Optimal Coping  Outcome: Not Progressing  Goal: Optimal Functional Ability  Outcome: Not Progressing  Goal: Absence of Infection Signs and Symptoms  Outcome: Not Progressing  Goal: Improved Oral Intake  Outcome: Not Progressing  Goal: Optimal Pain Control and Function  Outcome: Not Progressing  Goal: Skin Health and Integrity  Outcome: Not Progressing  Goal: Optimal Wound Healing  Outcome: Not Progressing      Lives at home with . Denies ETOH use , tobacco or drug use   Lives with   Denies ETOH use , tobacco or drug use   Retired teacher   Lives with   Denies h/o ETOH use , tobacco or drug use

## 2025-02-22 NOTE — HPI
ED HPI:  Juan Carlos Yoo Sr. is a 69 y.o. male with PMHX of alcoholic cirrhosis, HCC, thrombocytopenia, Afib (not on anticoagulation due to thrombocytopenia), ESRD on HD M/W/F who presents to the emergency department C/O SOB.     Patient was recently  admitted to [facility] due to severe anasarca JAE, evaluated for hepatorenal syndrome and had 40 day hospitalization.  Patient released from rehab about a week ago.  Has endorse increasing shortness of breath for the past 3 days.  States at night it is worse when trying to sleep.  Patient lays on his side and uses 3 pillows to prop his head up.  Patient went to urgent care today because he felt like tonight was going to be another bad night and they sent him to ER.  He denies fever.  States swelling is about baseline.  Had typical dialysis session today.  He reports they were trying to take off 3 L but is unsure of how much volume was removed.  He makes urine but states it is difficult to urinate due to anasarca.  He states compliance to low-sodium diet and fluid restriction since recent discharge.  Has not had alcohol in over 7 months.    IM HPI:  Patient's chart reviewed and above history noted.  Patient's been admitted for acute on chronic dyspnea and was diagnosed with rhinovirus.  Patient had been having increasing dyspnea weakness coughing with clear sputum production over the last 36 hours and ultimately presented with worsening shortness of breath.  He has been placed on oxygen nasal cannula states he is feeling much better.  Patient has a mild cough and wheezing on exam.  Patient has a complex medical history that is has been reviewed he seems slightly hypervolemic we will notify his nephrologist and he will likely need his routine hemodialysis if he ends up staying through Monday.  Patient is afebrile labs noted potassium low we will replete and maybe a good candidate for Aldactone.

## 2025-02-22 NOTE — ASSESSMENT & PLAN NOTE
Patient has long standing persistent (>12 months) atrial fibrillation. Patient is currently in atrial fibrillation. [id number] Score: 1. The patients heart rate in the last 24 hours is as follows:  Pulse  Min: 80  Max: 150     Antiarrhythmics  metoprolol tartrate (LOPRESSOR) tablet 25 mg, 2 times daily, Oral    Anticoagulants       Plan  - Replete lytes with a goal of K>4, Mg >2  - Patient is anticoagulated, see medications listed above.  - Patient's afib is currently controlled    Auto anticoagulated with CLD.

## 2025-02-22 NOTE — ASSESSMENT & PLAN NOTE
Creatine stable for now. BMP reviewed- noted Estimated Creatinine Clearance: 56.7 mL/min (A) (based on SCr of 1.7 mg/dL (H)). according to latest data. Based on current GFR, CKD stage is stage 5 - GFR < 15.  Monitor UOP and serial BMP and adjust therapy as needed. Renally dose meds. Avoid nephrotoxic medications and procedures.    Per renal, currently on MWF HD, ? Still needs this?

## 2025-02-22 NOTE — ASSESSMENT & PLAN NOTE
Nutrition consulted. Most recent weight and BMI monitored-     Measurements:  Wt Readings from Last 1 Encounters:   02/21/25 131.5 kg (290 lb)   Body mass index is 38.26 kg/m².    Patient has been screened and assessed by RD.    Malnutrition Type:  Context:    Level:      Malnutrition Characteristic Summary:       Interventions/Recommendations (treatment strategy):

## 2025-02-22 NOTE — PLAN OF CARE
[facility]  Initial Discharge Assessment       Primary Care Provider: Joao Villegas III, MD    Admission Diagnosis: SOB (shortness of breath) [R06.02]  Rhinovirus [B34.8]    Admission Date: 2/21/2025  Expected Discharge Date:     Transition of Care Barriers: None    Payor: BLUE CROSS BLUE SHIELD / Plan: [employer]BS [place] PPO / Product Type: PPO /     Extended Emergency Contact Information  Primary Emergency Contact: CindyChante  Mobile Phone: [phone]  Relation: Relative  Secondary Emergency Contact: JACQUIE RODRIGUEZ JR  Mobile Phone: [phone]  Relation: Son  Preferred language: English   needed? No    Discharge Plan A: Home  Discharge Plan B: Home with family      [id number] - [place], [place] - [address] [address]  [address] [address]  Phone: [phone] Fax: [phone]      Initial Assessment (most recent)       Adult Discharge Assessment - 02/22/25 1132          Discharge Assessment    Assessment Type Discharge Planning Assessment     Confirmed/corrected address, phone number and insurance Yes     Confirmed Demographics Correct on Facesheet     Source of Information patient     Communicated AUTUMN with patient/caregiver Date not available/Unable to determine     Reason For Admission shortness of breath     People in Home alone     Facility Arrived From: home     Do you expect to return to your current living situation? Yes     Do you have help at home or someone to help you manage your care at home? Yes   family lives within 100 yards, checks on patient    Who are your caregiver(s) and their phone number(s)? Sharmila White [phone]     Prior to hospitilization cognitive status: Unable to Assess     Current cognitive status: Alert/Oriented     Walking or Climbing Stairs Difficulty no     Dressing/Bathing Difficulty no     Equipment Currently Used at Home wheelchair;walker, rolling;other (see comments)   Does not use wheelchair    Readmission within 30 days? No     Patient  currently being followed by outpatient case management? Unable to determine (comments)     Do you currently have service(s) that help you manage your care at home? No     Do you take prescription medications? Yes     Do you have any problems affording any of your prescribed medications? TBD     Is the patient taking medications as prescribed? yes     Who is going to help you get home at discharge? family     How do you get to doctors appointments? car, drives self     Are you on dialysis? Yes     Dialysis Name and Scheduled days [facility]     Do you take coumadin? No     Discharge Plan A Home     Discharge Plan B Home with family     DME Needed Upon Discharge  none;other (see comments)   follow for possible need for home oxygen    Discharge Plan discussed with: Patient     Transition of Care Barriers None                   Awake alert.  DCP review completed.  Patient lives alone, Son lives within 100 yards.  Has step daughter who prepares medications for patient.  Contact information for both verified on face sheet.  Patient already has walker and wheelchair at home Drives States independent with ADLS.  Plans to return home upon discharge.  Does not have oxygen at home.

## 2025-02-22 NOTE — PLAN OF CARE
02/22/25 1129   Medicare Message   Important Message from Medicare regarding Discharge Appeal Rights Given to patient/caregiver;Explained to patient/caregiver;Signed/date by patient/caregiver   Date IMM was signed 02/22/25   Time IMM was signed 1129     Awake alert,  Explained Inpatient status and patient given copy of IM.  Reviewed Medicare Important Message.  Patient signs for delivery of IM.

## 2025-02-22 NOTE — PLAN OF CARE
POC reviewed with pt and son. Purposeful rounding ongoing. Meds administered per MAR. IV site clean/dry/intact, Saline locked. VSS on 2L nasal cannula. Droplet precautions maintained. Pt denies any pain or needs at this time. CB and personal items in reach, bed in lowest position, wheels locked, and lighting adjusted. Pt aware to call the nurse with any needs, verbally verifies understanding.     Problem: Adult Inpatient Plan of Care  Goal: Plan of Care Review  Outcome: Progressing  Goal: Patient-Specific Goal (Individualized)  Outcome: Progressing  Goal: Absence of Hospital-Acquired Illness or Injury  Outcome: Progressing  Goal: Optimal Comfort and Wellbeing  Outcome: Progressing  Goal: Readiness for Transition of Care  Outcome: Progressing     Problem: Acute Kidney Injury/Impairment  Goal: Fluid and Electrolyte Balance  Outcome: Progressing  Goal: Improved Oral Intake  Outcome: Progressing  Goal: Effective Renal Function  Outcome: Progressing     Problem: Infection  Goal: Absence of Infection Signs and Symptoms  Outcome: Progressing     Problem: Wound  Goal: Optimal Coping  Outcome: Progressing  Goal: Optimal Functional Ability  Outcome: Progressing  Goal: Absence of Infection Signs and Symptoms  Outcome: Progressing  Goal: Improved Oral Intake  Outcome: Progressing  Goal: Optimal Pain Control and Function  Outcome: Progressing  Goal: Skin Health and Integrity  Outcome: Progressing  Goal: Optimal Wound Healing  Outcome: Progressing

## 2025-02-22 NOTE — ED PROVIDER NOTES
EMERGENCY DEPARTMENT HISTORY AND PHYSICAL EXAM     This note is dictated on M*Modal word recognition program.  There are word recognition mistakes and grammatical errors that are occasionally missed on review.     Date: 2/21/2025   Patient Name: Juan Carlos Yoo Sr.       History of Presenting Illness      Chief Complaint   Patient presents with    Shortness of Breath     Pt referred to ED from Urgent Care - hx ESRD / cirrhosis - increased dyspnea / congestion for the past 3 days. Dialysis treatment today (M-W-F).         1830   Juan Carlos Yoo Sr. is a 69 y.o. male with PMHX of alcoholic cirrhosis, HCC, thrombocytopenia, Afib (not on anticoagulation due to thrombocytopenia), ESRD on HD M/W/F who presents to the emergency department C/O SOB.    Patient was recently  admitted to [facility] due to severe anasarca JAE, evaluated for hepatorenal syndrome and had 40 day hospitalization.  Patient released from rehab about a week ago.  Has endorse increasing shortness of breath for the past 3 days.  States at night it is worse when trying to sleep.  Patient lays on his side and uses 3 pillows to prop his head up.  Patient went to urgent care today because he felt like tonight was going to be another bad night and they sent him to ER.  He denies fever.  States swelling is about baseline.  Had typical dialysis session today.  He reports they were trying to take off 3 L but is unsure of how much volume was removed.  He makes urine but states it is difficult to urinate due to anasarca.  He states compliance to low-sodium diet and fluid restriction since recent discharge.  Has not had alcohol in over 7 months.    PCP: Joao Villegas III, MD      Current Medications[1]        Past History     Past Medical History:   Past Medical History:   Diagnosis Date    Atrial fibrillation     Bulging disc     Cirrhosis     Colon polyp 12/29/2017    Rpt 5 yrs    EV (esophageal varices)     Hypertension 03/30/2023    Renal disorder  "    Skin cancer 03/2017    Thrombocytopenia         Past Surgical History:   Past Surgical History:   Procedure Laterality Date    CATHETERIZATION OF BOTH LEFT AND RIGHT HEART N/A 2/8/2023    Procedure: CATHETERIZATION, HEART, BOTH LEFT AND RIGHT;  Surgeon: Flo Malone MD;  Location: [facility] CATH LAB;  Service: Cardiology;  Laterality: N/A;  low bleeding risk 1.0%    CHOLECYSTECTOMY      COLONOSCOPY      COLONOSCOPY N/A 12/29/2017    ta rpt 2022    CORONARY ANGIOGRAPHY N/A 2/8/2023    Procedure: ANGIOGRAM, CORONARY ARTERY;  Surgeon: Flo Malone MD;  Location: [facility] CATH LAB;  Service: Cardiology;  Laterality: N/A;    HERNIA REPAIR      SKIN BIOPSY  03/2017    TONSILLECTOMY      UPPER GASTROINTESTINAL ENDOSCOPY  2011    EV    UPPER GASTROINTESTINAL ENDOSCOPY  2016    VASECTOMY          Family History:   Family History   Problem Relation Name Age of Onset    Ovarian cancer Sister      Hyperlipidemia Brother      Cancer Maternal Uncle          Leukemia    Heart disease Maternal Uncle      Colon cancer Neg Hx          Social History:   Social History[2]     Allergies:   Review of patient's allergies indicates:  No Known Allergies       Review of Systems   Review of Systems   See HPI for pertinent positives and negatives       Physical Exam     Vitals:    02/21/25 2115 02/21/25 2151 02/21/25 2200 02/21/25 2351   BP:   (!) 114/59 (!) 117/55   BP Location:   Right arm    Patient Position:   Lying    Pulse: 97 89  89   Resp: 16 20  20   Temp:  97.7 °F (36.5 °C)  97.7 °F (36.5 °C)   TempSrc:  Oral     SpO2: 99% 99%  95%   Weight:   131.5 kg (290 lb)    Height:   6' 1" (1.854 m)       Physical Exam  Vitals and nursing note reviewed.   Constitutional:       General: He is not in acute distress.     Appearance: Normal appearance. He is well-developed and overweight. He is ill-appearing.   HENT:      Head: Normocephalic and atraumatic.   Eyes:      Extraocular Movements: Extraocular movements intact.      " Conjunctiva/sclera: Conjunctivae normal.      Comments: Mild scleral icterus   Cardiovascular:      Rate and Rhythm: Tachycardia present. Rhythm irregular.   Pulmonary:      Effort: Pulmonary effort is normal. Tachypnea present. No respiratory distress.      Breath sounds: Decreased breath sounds and wheezing present.   Chest:      Comments: Right chest wall catheter  Abdominal:      General: There is distension.   Musculoskeletal:         General: No deformity or signs of injury. Normal range of motion.      Cervical back: Normal range of motion. No rigidity.      Right lower leg: 3+ Pitting Edema present.      Left lower leg: 3+ Pitting Edema present.   Skin:     General: Skin is dry.      Coloration: Skin is not pale.      Findings: No rash.   Neurological:      General: No focal deficit present.      Mental Status: He is alert and oriented to person, place, and time.      Cranial Nerves: No cranial nerve deficit.      Motor: No weakness.      Coordination: Coordination normal.              Diagnostic Study Results      Labs -   Recent Results (from the past 12 hours)   CBC Auto Differential    Collection Time: 02/21/25  6:42 PM   Result Value Ref Range    WBC 8.61 3.90 - 12.70 K/uL    RBC 3.89 (L) 4.60 - 6.20 M/uL    Hemoglobin 11.4 (L) 14.0 - 18.0 g/dL    Hematocrit 36.4 (L) 40.0 - 54.0 %    MCV 94 82 - 98 fL    MCH 29.3 27.0 - 31.0 pg    MCHC 31.3 (L) 32.0 - 36.0 g/dL    RDW 18.8 (H) 11.5 - 14.5 %    Platelets 82 (L) 150 - 450 K/uL    MPV 12.6 9.2 - 12.9 fL    Immature Granulocytes 0.2 0.0 - 0.5 %    Gran # (ANC) 6.6 1.8 - 7.7 K/uL    Immature Grans (Abs) 0.02 0.00 - 0.04 K/uL    Lymph # 0.8 (L) 1.0 - 4.8 K/uL    Mono # 1.1 (H) 0.3 - 1.0 K/uL    Eos # 0.1 0.0 - 0.5 K/uL    Baso # 0.05 0.00 - 0.20 K/uL    nRBC 0 0 /100 WBC    Gran % 76.8 (H) 38.0 - 73.0 %    Lymph % 8.8 (L) 18.0 - 48.0 %    Mono % 12.8 4.0 - 15.0 %    Eosinophil % 0.8 0.0 - 8.0 %    Basophil % 0.6 0.0 - 1.9 %    Differential Method Automated     Comprehensive Metabolic Panel    Collection Time: 02/21/25  6:42 PM   Result Value Ref Range    Sodium 135 (L) 136 - 145 mmol/L    Potassium 2.8 (L) 3.5 - 5.1 mmol/L    Chloride 97 95 - 110 mmol/L    CO2 28 23 - 29 mmol/L    Glucose 107 70 - 110 mg/dL    BUN 5 (L) 8 - 23 mg/dL    Creatinine 1.4 0.5 - 1.4 mg/dL    Calcium 8.0 (L) 8.7 - 10.5 mg/dL    Total Protein 7.7 6.0 - 8.4 g/dL    Albumin 2.5 (L) 3.5 - 5.2 g/dL    Total Bilirubin 6.6 (H) 0.1 - 1.0 mg/dL    Alkaline Phosphatase 92 55 - 135 U/L    AST 44 (H) 10 - 40 U/L    ALT 16 10 - 44 U/L    eGFR 53.7 (A) >60 mL/min/1.73 m^2    Anion Gap 10 8 - 16 mmol/L   Protime-INR    Collection Time: 02/21/25  6:42 PM   Result Value Ref Range    Prothrombin Time 20.3 (H) 9.0 - 12.5 sec    INR 1.9 (H) 0.8 - 1.2   Ammonia    Collection Time: 02/21/25  6:42 PM   Result Value Ref Range    Ammonia 38 10 - 50 umol/L   Respiratory Infection Panel (PCR), Nasopharyngeal    Collection Time: 02/21/25  7:12 PM    Specimen: Nasopharyngeal Swab   Result Value Ref Range    Respiratory Infection Panel Source NP swab     Adenovirus Not Detected Not Detected    Coronavirus 229E, Common Cold Virus Not Detected Not Detected    Coronavirus HKU1, Common Cold Virus Not Detected Not Detected    Coronavirus NL63, Common Cold Virus Not Detected Not Detected    Coronavirus OC43, Common Cold Virus Not Detected Not Detected    SARS-CoV2 (COVID-19) Qualitative PCR Not Detected Not Detected    Human Metapneumovirus Not Detected Not Detected    Human Rhinovirus/Enterovirus Detected (A) Not Detected    Influenza A Not Detected Not Detected    Influenza B Not Detected Not Detected    Parainfluenza Virus 1 Not Detected Not Detected    Parainfluenza Virus 2 Not Detected Not Detected    Parainfluenza Virus 3 Not Detected Not Detected    Parainfluenza Virus 4 Not Detected Not Detected    Respiratory Syncytial Virus Not Detected Not Detected    Bordetella Parapertussis (RA7671) Not Detected Not Detected     Bordetella pertussis (ptxP) Not Detected Not Detected    Chlamydia pneumoniae Not Detected Not Detected    Mycoplasma pneumoniae Not Detected Not Detected   Type & Screen    Collection Time: 02/21/25  7:18 PM   Result Value Ref Range    Group & Rh A POS     Indirect Kristin NEG     Specimen Outdate 02/24/2025 23:59         Radiologic Studies -    X-Ray Chest 1 View   Final Result      Mild hazy bibasilar opacities representing edema or pneumonitis.         Electronically signed by: Lázaro Mtz MD   Date:    02/21/2025   Time:    19:22           Medications given in the ED-   Medications   sodium chloride 0.9% flush 10 mL (has no administration in time range)   melatonin tablet 6 mg (has no administration in time range)   ondansetron injection 4 mg (has no administration in time range)   levalbuterol nebulizer solution 0.63 mg (0.63 mg Nebulization Given 2/21/25 2115)   metoprolol tartrate (LOPRESSOR) tablet 25 mg (has no administration in time range)   tamsulosin 24 hr capsule 0.4 mg (has no administration in time range)   furosemide tablet 80 mg (has no administration in time range)   lactulose 20 gram/30 mL solution Soln 30 g (has no administration in time range)   midodrine tablet 15 mg (has no administration in time range)   famotidine tablet 20 mg (has no administration in time range)   levalbuterol nebulizer solution 0.63 mg (0.63 mg Nebulization Given 2/21/25 1831)           Medical Decision Making    I am the first provider for this patient.     I reviewed the vital signs, available nursing notes, past medical history, past surgical history, family history and social history.     Vital Signs:  Reviewed the patient's vital signs.     Pulse Oximetry Analysis and Interpretation:    97% on NC, hypoxia requiring supplemental oxygen      EKG Interpretation: (Per my independent interpretation, pending formal read)   Interpreted by Arnaud Rios MD at 1835   AFib RVR rate of 119 with PVCs no STEMI     CXR   Interpretation: (Per my independent interpretation, pending formal read)   CXR read by Dr. Arnaud Rios     Cardiac silhouette within normal limits, mild bibasilar opacities agree with Radiology interpretation     External Test Results (Pertinent to encounter):    Records Reviewed: Nursing Notes, Current Prescription Medications, Old Medical Records, External Medical Records , Previous EKG, and Previous Radiology Studies    History Obtained By: Patient    Provider Notes: Juan Carlos Yoo Sr. is a 69 y.o. male with SOB, orthopnea    Co-morbidities Considered: ESRD, cirrhosis    Differential Diagnosis:  Volume overload, CHF, pneumonia, sepsis, URI      ED Course:    Patient presents with shortness of breath and orthopnea in the setting of ESRD and cirrhosis.  In the ER patient is mildly breathless.  Initially hypoxic on room air improved with nasal cannula.    Vital signs otherwise within normal limits.  Labs obtained.  Hemoglobin stable.  No leukocytosis.  LFTs stable.  Mild hypokalemia which is typical for patient and would defer repletion as patient has ESRD    Patient initially AFib RVR.  This improved after taking his home medications and with treatment in the emergency department for shortness of breath.  Patient reported improvement with Xopenex nebulizer.    He is found to be positive for rhino virus.  Given supplemental oxygen requirements and comorbid medical conditions will plan on admitting patient for treatment and oxygen supplementation as needed.    CONSULT NOTE:    Dr. Rios discussed care with?Dr Villegas, Hospitalist   It was a standard discussion,?including history of patients chief complaint, available diagnostic results, and treatment course.?Discussed case.  Agrees with admission            ED Course as of 02/22/25 0433   Fri Feb 21, 2025 2009 Human Rhinovirus/Enterovirus(!): Detected [MO]      ED Course User Index  [MO] Arnaud Rios MD       Problems Addressed:  Shortness of  breath    Procedures:   Procedures       Diagnosis and Disposition     Critical Care:      I have spent 30 minutes of critical care time involved in lab review, consultations with specialist, family decision-making, and documentation.  During this entire length of time I was immediately available to the patient.     Critical Care:  The reason for providing this level of medical care for this critically ill patient was due a critical illness that impaired one or more vital organ systems such that there was a high probability of imminent or life threatening deterioration in the patients condition. This care involved high complexity decision making to assess, manipulate, and support vital system functions, to treat this degreee vital organ system failure and to prevent further life threatening deterioration of the patients condition.               CLINICAL IMPRESSION:         1. Rhinovirus infection    2. SOB (shortness of breath)    3. Rhinovirus    4. Abscess of right thigh    5. CKD (chronic kidney disease) stage 4, GFR 15-29 ml/min    6. Debility    7. Hypokalemia              PLAN:   1. Admit to Hospital  2.      Medication List        ASK your doctor about these medications      famotidine 20 MG tablet  Commonly known as: PEPCID  TAKE 1 TABLET BY MOUTH EVERY OTHER DAY     furosemide 80 MG tablet  Commonly known as: LASIX     lactulose 20 gram/30 mL Soln  Commonly known as: CHRONULAC  Take 45 mLs (30 g total) by mouth 3 (three) times daily. TITRATE TO 3-4 BOWEL MOVEMENTS DAILY.     metoprolol tartrate 25 MG tablet  Commonly known as: LOPRESSOR  TAKE 0.5 TABLETS BY MOUTH 2 TIMES DAILY.     midodrine 5 MG Tab  Commonly known as: PROAMATINE  Take 3 tablets (15 mg total) by mouth every 8 (eight) hours.     potassium chloride 20 mEq  Commonly known as: K-TAB     tamsulosin 0.4 mg Cap  Commonly known as: FLOMAX     TUBERSOL IDRM             3. No follow-up provider specified.     _______________________________      Please note that this dictation was completed with [email or web address], the [employer] voice recognition software.  Quite often unanticipated grammatical, syntax, homophones, and other interpretive errors are inadvertently transcribed by the computer software.  Please disregard these errors.  Please excuse any errors that have escaped final proofreading.             Arnaud Rios MD  02/22/25 0434         [1]   Current Facility-Administered Medications   Medication Dose Route Frequency Provider Last Rate Last Admin    famotidine tablet 20 mg  20 mg Oral Every other day Arnuad Rios MD        furosemide tablet 80 mg  80 mg Oral Every Tues, Thurs, Sat, Sun Arnaud Rios MD        lactulose 20 gram/30 mL solution Soln 30 g  30 g Oral TID Arnaud Rios MD        levalbuterol nebulizer solution 0.63 mg  0.63 mg Nebulization Q4H While awake Arnaud Rios MD   0.63 mg at 02/21/25 2115    melatonin tablet 6 mg  6 mg Oral Nightly PRN Arnaud Rios MD        metoprolol tartrate (LOPRESSOR) tablet 25 mg  25 mg Oral BID Arnaud Rios MD        midodrine tablet 15 mg  15 mg Oral Q8H Arnaud Rios MD        ondansetron injection 4 mg  4 mg Intravenous Q8H PRN Arnaud Rios MD        sodium chloride 0.9% flush 10 mL  10 mL Intravenous PRN Arnaud Rios MD        tamsulosin 24 hr capsule 0.4 mg  0.4 mg Oral Daily Arnaud Rios MD       [2]   Social History  Tobacco Use    Smoking status: Never     Passive exposure: Never    Smokeless tobacco: Never   Substance Use Topics    Alcohol use: Not Currently     Alcohol/week: 0.0 standard drinks of alcohol    Drug use: No        Arnaud Rios MD  03/11/25 0835

## 2025-02-22 NOTE — ASSESSMENT & PLAN NOTE
Body mass index is 38.26 kg/m². Morbid obesity complicates all aspects of disease management from diagnostic modalities to treatment. Weight loss encouraged and health benefits explained to patient.

## 2025-02-22 NOTE — ASSESSMENT & PLAN NOTE
Patient with known Cirrhosis with Child's class C. Co-morbidities are present and inclusive of portal hypertension, hepatic encephalopathy, malnutrition, anemia/pancytopenia, and anticoagulation.  MELD-Na score calculated; MELD 3.0: 27 at 2/22/2025  5:35 AM  MELD-Na: 25 at 2/22/2025  5:35 AM  Calculated from:  Serum Creatinine: 1.7 mg/dL at 2/22/2025  5:35 AM  Serum Sodium: 136 mmol/L at 2/22/2025  5:35 AM  Total Bilirubin: 5.6 mg/dL at 2/22/2025  5:35 AM  Serum Albumin: 2 g/dL at 2/22/2025  5:35 AM  INR(ratio): 1.9 at 2/21/2025  6:42 PM  Age at listing (hypothetical): 69 years  Sex: Male at 2/22/2025  5:35 AM      Continue chronic meds. Etiology likely ETOH. Will avoid any hepatotoxic meds, and monitor CBC/CMP/INR for synthetic function.

## 2025-02-22 NOTE — NURSING
Patient arrived to Med Surg unit at approximately 2150 with personal belongings in stretcher with ED,RN. Transferred to med surg bed safely with stand by assist. 18 gauge IV to L AC saline locked flushes well, 2L NC tolerating well, HD catheter to R IJ with gauze and tap applied upon transfer. Droplet precautions initiated due to rhinovirus. Excess mouth secretions noted; kleenex provided. Mouth care offered, pt declined despite education. Urinal provided to pt, education completed on needing a urine sample for a UA, pt verbalized understanding. Oriented pt to room and use of call light. [place] and water provided to pt. Call light and personal items within reach.

## 2025-02-22 NOTE — H&P
[facility]  History & Physical    Patient Name: Juan Carlos Yoo Sr.  MRN: [id number]  Patient Class: OP- Observation  Admission Date: 2/21/2025  Attending Physician: Joao Villegas III, MD  Primary Care Provider: Joao Villegas III, MD         Patient information was obtained from patient, past medical records, and ER records.     Subjective:     Principal Problem:Rhinovirus infection    Chief Complaint:   Chief Complaint   Patient presents with    Shortness of Breath     Pt referred to ED from Urgent Care - hx ESRD / cirrhosis - increased dyspnea / congestion for the past 3 days. Dialysis treatment today (M-W-F).         HPI: ED HPI:  Juan Carlos Yoo Sr. is a 69 y.o. male with PMHX of alcoholic cirrhosis, HCC, thrombocytopenia, Afib (not on anticoagulation due to thrombocytopenia), ESRD on HD M/W/F who presents to the emergency department C/O SOB.     Patient was recently  admitted to [facility] due to severe anasarca JAE, evaluated for hepatorenal syndrome and had 40 day hospitalization.  Patient released from rehab about a week ago.  Has endorse increasing shortness of breath for the past 3 days.  States at night it is worse when trying to sleep.  Patient lays on his side and uses 3 pillows to prop his head up.  Patient went to urgent care today because he felt like tonight was going to be another bad night and they sent him to ER.  He denies fever.  States swelling is about baseline.  Had typical dialysis session today.  He reports they were trying to take off 3 L but is unsure of how much volume was removed.  He makes urine but states it is difficult to urinate due to anasarca.  He states compliance to low-sodium diet and fluid restriction since recent discharge.  Has not had alcohol in over 7 months.    IM HPI:  Patient's chart reviewed and above history noted.  Patient's been admitted for acute on chronic dyspnea and was diagnosed with rhinovirus.  Patient had been  having increasing dyspnea weakness coughing with clear sputum production over the last 36 hours and ultimately presented with worsening shortness of breath.  He has been placed on oxygen nasal cannula states he is feeling much better.  Patient has a mild cough and wheezing on exam.  Patient has a complex medical history that is has been reviewed he seems slightly hypervolemic we will notify his nephrologist and he will likely need his routine hemodialysis if he ends up staying through Monday.  Patient is afebrile labs noted potassium low we will replete and maybe a good candidate for Aldactone.    Past Medical History:   Diagnosis Date    Atrial fibrillation     Bulging disc     Cirrhosis     Colon polyp 12/29/2017    Rpt 5 yrs    EV (esophageal varices)     Hypertension 03/30/2023    Renal disorder     Skin cancer 03/2017    Thrombocytopenia        Past Surgical History:   Procedure Laterality Date    CATHETERIZATION OF BOTH LEFT AND RIGHT HEART N/A 2/8/2023    Procedure: CATHETERIZATION, HEART, BOTH LEFT AND RIGHT;  Surgeon: Flo Malone MD;  Location: [facility] CATH LAB;  Service: Cardiology;  Laterality: N/A;  low bleeding risk 1.0%    CHOLECYSTECTOMY      COLONOSCOPY      COLONOSCOPY N/A 12/29/2017    ta rpt 2022    CORONARY ANGIOGRAPHY N/A 2/8/2023    Procedure: ANGIOGRAM, CORONARY ARTERY;  Surgeon: Flo Malone MD;  Location: [facility] CATH LAB;  Service: Cardiology;  Laterality: N/A;    HERNIA REPAIR      SKIN BIOPSY  03/2017    TONSILLECTOMY      UPPER GASTROINTESTINAL ENDOSCOPY  2011    EV    UPPER GASTROINTESTINAL ENDOSCOPY  2016    VASECTOMY         Review of patient's allergies indicates:  No Known Allergies    No current facility-administered medications on file prior to encounter.     Current Outpatient Medications on File Prior to Encounter   Medication Sig    famotidine (PEPCID) 20 MG tablet TAKE 1 TABLET BY MOUTH EVERY OTHER DAY    furosemide (LASIX) 80 MG tablet Take 80 mg by mouth every Tuesday,  Thursday, Saturday, .    lactulose (CHRONULAC) 20 gram/30 mL Soln Take 45 mLs (30 g total) by mouth 3 (three) times daily. TITRATE TO 3-4 BOWEL MOVEMENTS DAILY.    metoprolol tartrate (LOPRESSOR) 25 MG tablet TAKE 0.5 TABLETS BY MOUTH 2 TIMES DAILY.    midodrine (PROAMATINE) 5 MG Tab Take 3 tablets (15 mg total) by mouth every 8 (eight) hours.    potassium chloride (K-TAB) 20 mEq SMARTSI Tablet(s) By Mouth    tamsulosin (FLOMAX) 0.4 mg Cap Take by mouth.    tuberculin,purif.prot.deriv. (TUBERSOL IDRM) Inject 0.1 mLs into the skin.     Family History       Problem Relation (Age of Onset)    Cancer Maternal Uncle    Heart disease Maternal Uncle    Hyperlipidemia Brother    Ovarian cancer Sister          Tobacco Use    Smoking status: Never     Passive exposure: Never    Smokeless tobacco: Never   Substance and Sexual Activity    Alcohol use: Not Currently     Alcohol/week: 0.0 standard drinks of alcohol    Drug use: No    Sexual activity: Not Currently     Review of Systems   Constitutional:  Positive for activity change, appetite change and fatigue. Negative for chills and fever.   HENT:  Negative for ear pain, mouth sores, nosebleeds and sore throat.    Eyes:  Negative for visual disturbance.   Respiratory:  Positive for cough, shortness of breath and wheezing. Negative for chest tightness and stridor.    Cardiovascular:  Positive for leg swelling. Negative for chest pain and palpitations.   Gastrointestinal:  Negative for abdominal distention, abdominal pain, blood in stool, constipation, diarrhea, nausea and vomiting.   Endocrine: Negative for polyphagia.   Genitourinary:  Positive for difficulty urinating. Negative for dysuria, flank pain and frequency.   Musculoskeletal:  Positive for gait problem. Negative for myalgias.   Skin:  Negative for rash.   Neurological:  Positive for weakness. Negative for dizziness, tremors, seizures, syncope, facial asymmetry, speech difficulty and headaches.    Hematological:  Negative for adenopathy.   Psychiatric/Behavioral:  Positive for confusion. Negative for agitation and hallucinations. The patient is not nervous/anxious.      Objective:     Vital Signs (Most Recent):  Temp: 97.7 °F (36.5 °C) (02/22/25 0444)  Pulse: 80 (02/22/25 0939)  Resp: 20 (02/22/25 0748)  BP: (!) 141/68 (02/22/25 0939)  SpO2: 99 % (02/22/25 0748) Vital Signs (24h Range):  Temp:  [97.7 °F (36.5 °C)-97.9 °F (36.6 °C)] 97.7 °F (36.5 °C)  Pulse:  [] 80  Resp:  [11-26] 20  SpO2:  [86 %-100 %] 99 %  BP: (112-142)/(55-72) 141/68     Weight: 131.5 kg (290 lb)  Body mass index is 38.26 kg/m².     Physical Exam  Vitals and nursing note reviewed.   Constitutional:       General: He is not in acute distress.     Appearance: He is obese. He is ill-appearing. He is not diaphoretic.   HENT:      Head: Normocephalic and atraumatic.      Right Ear: External ear normal.      Left Ear: External ear normal.      Nose: Nose normal. No rhinorrhea.      Mouth/Throat:      Mouth: Mucous membranes are moist.      Pharynx: No oropharyngeal exudate or posterior oropharyngeal erythema.   Eyes:      General: Scleral icterus present.      Extraocular Movements: Extraocular movements intact.   Neck:      Vascular: No carotid bruit.   Cardiovascular:      Rate and Rhythm: Normal rate. Rhythm irregular.      Heart sounds: Normal heart sounds. No murmur heard.     No friction rub. No gallop.   Pulmonary:      Effort: No respiratory distress.      Breath sounds: No stridor. Wheezing and rhonchi present. No rales.   Chest:      Chest wall: No tenderness.   Abdominal:      General: There is no distension.      Palpations: There is no mass.      Tenderness: There is no abdominal tenderness. There is no right CVA tenderness, left CVA tenderness, guarding or rebound.      Hernia: No hernia is present.   Musculoskeletal:         General: No swelling, tenderness or deformity.      Cervical back: No rigidity.      Right lower  leg: Edema present.      Left lower leg: Edema present.   Lymphadenopathy:      Cervical: No cervical adenopathy.   Skin:     Capillary Refill: Capillary refill takes less than 2 seconds.      Coloration: Skin is not jaundiced or pale.      Findings: No rash.   Neurological:      Mental Status: He is alert.      Cranial Nerves: No cranial nerve deficit.      Sensory: No sensory deficit.      Motor: Weakness present.      Coordination: Coordination normal.   Psychiatric:         Mood and Affect: Mood normal.         Behavior: Behavior normal.         Thought Content: Thought content normal.         Judgment: Judgment normal.                Significant Labs: All pertinent labs within the past 24 hours have been reviewed.  Recent Lab Results         02/22/25  0535   02/21/25 1918 02/21/25  1912 02/21/25  1842        Respiratory Infection Panel Source     NP swab         Adenovirus     Not Detected         Coronavirus 229E, Common Cold Virus     Not Detected         Coronavirus HKU1, Common Cold Virus     Not Detected         Coronavirus NL63, Common Cold Virus     Not Detected         Coronavirus OC43, Common Cold Virus     Not Detected  Comment: The Coronavirus strains detected in this test cause the common cold.  These strains are not the COVID-19 (novel Coronavirus)strain   associated with the respiratory disease outbreak.           Human Metapneumovirus     Not Detected         Human Rhinovirus/Enterovirus     Detected         Influenza A     Not Detected         Influenza B     Not Detected         Parainfluenza Virus 1     Not Detected         Parainfluenza Virus 2     Not Detected         Parainfluenza Virus 3     Not Detected         Parainfluenza Virus 4     Not Detected         Respiratory Syncytial Virus     Not Detected         Bordetella Parapertussis ([id number])     Not Detected         Bordetella pertussis (ptxP)     Not Detected         Chlamydia pneumoniae     Not Detected         Mycoplasma  pneumoniae     Not Detected         Albumin 2.0       2.5       ALP 75       92       ALT 14       16       Ammonia       38       Anion Gap 8       10       AST 32       44       Baso # 0.05       0.05       Basophil % 0.8       0.6       BILIRUBIN TOTAL 5.6  Comment: For infants and newborns, interpretation of results should be based  on gestational age, weight and in agreement with clinical  observations.    Premature Infant recommended reference ranges:  Up to 24 hours.............<8.0 mg/dL  Up to 48 hours............<12.0 mg/dL  3-5 days..................<15.0 mg/dL  6-29 days.................<15.0 mg/dL         6.6  Comment: For infants and newborns, interpretation of results should be based  on gestational age, weight and in agreement with clinical  observations.    Premature Infant recommended reference ranges:  Up to 24 hours.............<8.0 mg/dL  Up to 48 hours............<12.0 mg/dL  3-5 days..................<15.0 mg/dL  6-29 days.................<15.0 mg/dL         BUN 7       5       Calcium 8.0       8.0       Chloride 98       97       CO2 30       28       Creatinine 1.7       1.4       Differential Method Automated       Automated       eGFR 42.6       53.7       Eos # 0.2       0.1       Eos % 3.8       0.8       Glucose 98       107       Gran # (ANC) 3.6       6.6       Gran % 58.9       76.8       Group & Rh   A POS           Hematocrit 31.5       36.4       Hemoglobin 10.2       11.4       Immature Grans (Abs) 0.02  Comment: Mild elevation in immature granulocytes is non specific and   can be seen in a variety of conditions including stress response,   acute inflammation, trauma and pregnancy. Correlation with other   laboratory and clinical findings is essential.         0.02  Comment: Mild elevation in immature granulocytes is non specific and   can be seen in a variety of conditions including stress response,   acute inflammation, trauma and pregnancy. Correlation with other   laboratory  and clinical findings is essential.         Immature Granulocytes 0.3       0.2       INDIRECT JAYLEEN   NEG           INR       1.9  Comment: Coumadin Therapy:  2.0 - 3.0 for INR for all indicators except mechanical heart valves  and antiphospholipid syndromes which should use 2.5 - 3.5.         Lymph # 0.9       0.8       Lymph % 14.7       8.8       MCH 30.3       29.3       MCHC 32.4       31.3       MCV 94       94       Mono # 1.3       1.1       Mono % 21.5       12.8       MPV 12.6       12.6       nRBC 0       0       Platelet Count 57       82       Potassium 2.8       2.8       PROTEIN TOTAL 6.3       7.7       PT       20.3       RBC 3.37       3.89       RDW 18.7       18.8       SARS-CoV2 (COVID-19) Qualitative PCR     Not Detected         Sodium 136       135       Specimen Outdate   02/24/2025 23:59           WBC 6.04       8.61               Significant Imaging: I have reviewed all pertinent imaging results/findings within the past 24 hours.  Assessment/Plan:     * Rhinovirus infection  Conservative care, + now with O2 needs.      Morbid obesity  Body mass index is 38.26 kg/m². Morbid obesity complicates all aspects of disease management from diagnostic modalities to treatment. Weight loss encouraged and health benefits explained to patient.         Moderate protein-calorie malnutrition  Nutrition consulted. Most recent weight and BMI monitored-     Measurements:  Wt Readings from Last 1 Encounters:   02/21/25 131.5 kg (290 lb)   Body mass index is 38.26 kg/m².    Patient has been screened and assessed by RD.    Malnutrition Type:  Context:    Level:      Malnutrition Characteristic Summary:       Interventions/Recommendations (treatment strategy):         Hypotension        Hepatic encephalopathy  Continue lactulose.      CKD (chronic kidney disease) stage 4, GFR 15-29 ml/min  Creatine stable for now. BMP reviewed- noted Estimated Creatinine Clearance: 56.7 mL/min (A) (based on SCr of 1.7 mg/dL  (H)). according to latest data. Based on current GFR, CKD stage is stage 5 - GFR < 15.  Monitor UOP and serial BMP and adjust therapy as needed. Renally dose meds. Avoid nephrotoxic medications and procedures.    Per renal, currently on MWF HD, ? Still needs this?    Suspected Hepatorenal syndrome  Slightly hypervolemic today, much improved from past care.      Longstanding persistent atrial fibrillation  Patient has long standing persistent (>12 months) atrial fibrillation. Patient is currently in atrial fibrillation. [id number] Score: 1. The patients heart rate in the last 24 hours is as follows:  Pulse  Min: 80  Max: 150     Antiarrhythmics  metoprolol tartrate (LOPRESSOR) tablet 25 mg, 2 times daily, Oral    Anticoagulants       Plan  - Replete lytes with a goal of K>4, Mg >2  - Patient is anticoagulated, see medications listed above.  - Patient's afib is currently controlled    Auto anticoagulated with CLD.        Coagulopathy  CLD      Cirrhosis  Patient with known Cirrhosis with Child's class C. Co-morbidities are present and inclusive of portal hypertension, hepatic encephalopathy, malnutrition, anemia/pancytopenia, and anticoagulation.  MELD-Na score calculated; MELD 3.0: 27 at 2/22/2025  5:35 AM  MELD-Na: 25 at 2/22/2025  5:35 AM  Calculated from:  Serum Creatinine: 1.7 mg/dL at 2/22/2025  5:35 AM  Serum Sodium: 136 mmol/L at 2/22/2025  5:35 AM  Total Bilirubin: 5.6 mg/dL at 2/22/2025  5:35 AM  Serum Albumin: 2 g/dL at 2/22/2025  5:35 AM  INR(ratio): 1.9 at 2/21/2025  6:42 PM  Age at listing (hypothetical): 69 years  Sex: Male at 2/22/2025  5:35 AM      Continue chronic meds. Etiology likely ETOH. Will avoid any hepatotoxic meds, and monitor CBC/CMP/INR for synthetic function.       VTE Risk Mitigation (From admission, onward)           Ordered     Place sequential compression device  Until discontinued         02/22/25 1002     IP VTE HIGH RISK PATIENT  Once         02/21/25 2204     Place ANDREW hose  Until  discontinued         02/21/25 2204                       On 02/22/2025, patient should be placed in hospital observation services under my care.             Joao Villegas III, MD  Department of Hospital Medicine  [facility]

## 2025-02-22 NOTE — SUBJECTIVE & OBJECTIVE
Past Medical History:   Diagnosis Date    Atrial fibrillation     Bulging disc     Cirrhosis     Colon polyp 2017    Rpt 5 yrs    EV (esophageal varices)     Hypertension 2023    Renal disorder     Skin cancer 2017    Thrombocytopenia        Past Surgical History:   Procedure Laterality Date    CATHETERIZATION OF BOTH LEFT AND RIGHT HEART N/A 2023    Procedure: CATHETERIZATION, HEART, BOTH LEFT AND RIGHT;  Surgeon: Flo Malone MD;  Location: [facility] CATH LAB;  Service: Cardiology;  Laterality: N/A;  low bleeding risk 1.0%    CHOLECYSTECTOMY      COLONOSCOPY      COLONOSCOPY N/A 2017    ta rpt     CORONARY ANGIOGRAPHY N/A 2023    Procedure: ANGIOGRAM, CORONARY ARTERY;  Surgeon: Flo Malone MD;  Location: [facility] CATH LAB;  Service: Cardiology;  Laterality: N/A;    HERNIA REPAIR      SKIN BIOPSY  2017    TONSILLECTOMY      UPPER GASTROINTESTINAL ENDOSCOPY      EV    UPPER GASTROINTESTINAL ENDOSCOPY  2016    VASECTOMY         Review of patient's allergies indicates:  No Known Allergies    No current facility-administered medications on file prior to encounter.     Current Outpatient Medications on File Prior to Encounter   Medication Sig    famotidine (PEPCID) 20 MG tablet TAKE 1 TABLET BY MOUTH EVERY OTHER DAY    furosemide (LASIX) 80 MG tablet Take 80 mg by mouth every Tuesday, Thursday, Saturday, .    lactulose (CHRONULAC) 20 gram/30 mL Soln Take 45 mLs (30 g total) by mouth 3 (three) times daily. TITRATE TO 3-4 BOWEL MOVEMENTS DAILY.    metoprolol tartrate (LOPRESSOR) 25 MG tablet TAKE 0.5 TABLETS BY MOUTH 2 TIMES DAILY.    midodrine (PROAMATINE) 5 MG Tab Take 3 tablets (15 mg total) by mouth every 8 (eight) hours.    potassium chloride (K-TAB) 20 mEq SMARTSI Tablet(s) By Mouth    tamsulosin (FLOMAX) 0.4 mg Cap Take by mouth.    tuberculin,purif.prot.deriv. (TUBERSOL IDRM) Inject 0.1 mLs into the skin.     Family History       Problem Relation (Age of Onset)     Cancer Maternal Uncle    Heart disease Maternal Uncle    Hyperlipidemia Brother    Ovarian cancer Sister          Tobacco Use    Smoking status: Never     Passive exposure: Never    Smokeless tobacco: Never   Substance and Sexual Activity    Alcohol use: Not Currently     Alcohol/week: 0.0 standard drinks of alcohol    Drug use: No    Sexual activity: Not Currently     Review of Systems   Constitutional:  Positive for activity change, appetite change and fatigue. Negative for chills and fever.   HENT:  Negative for ear pain, mouth sores, nosebleeds and sore throat.    Eyes:  Negative for visual disturbance.   Respiratory:  Positive for cough, shortness of breath and wheezing. Negative for chest tightness and stridor.    Cardiovascular:  Positive for leg swelling. Negative for chest pain and palpitations.   Gastrointestinal:  Negative for abdominal distention, abdominal pain, blood in stool, constipation, diarrhea, nausea and vomiting.   Endocrine: Negative for polyphagia.   Genitourinary:  Positive for difficulty urinating. Negative for dysuria, flank pain and frequency.   Musculoskeletal:  Positive for gait problem. Negative for myalgias.   Skin:  Negative for rash.   Neurological:  Positive for weakness. Negative for dizziness, tremors, seizures, syncope, facial asymmetry, speech difficulty and headaches.   Hematological:  Negative for adenopathy.   Psychiatric/Behavioral:  Positive for confusion. Negative for agitation and hallucinations. The patient is not nervous/anxious.      Objective:     Vital Signs (Most Recent):  Temp: 97.7 °F (36.5 °C) (02/22/25 0444)  Pulse: 80 (02/22/25 0939)  Resp: 20 (02/22/25 0748)  BP: (!) 141/68 (02/22/25 0939)  SpO2: 99 % (02/22/25 0748) Vital Signs (24h Range):  Temp:  [97.7 °F (36.5 °C)-97.9 °F (36.6 °C)] 97.7 °F (36.5 °C)  Pulse:  [] 80  Resp:  [11-26] 20  SpO2:  [86 %-100 %] 99 %  BP: (112-142)/(55-72) 141/68     Weight: 131.5 kg (290 lb)  Body mass index is 38.26  kg/m².     Physical Exam  Vitals and nursing note reviewed.   Constitutional:       General: He is not in acute distress.     Appearance: He is obese. He is ill-appearing. He is not diaphoretic.   HENT:      Head: Normocephalic and atraumatic.      Right Ear: External ear normal.      Left Ear: External ear normal.      Nose: Nose normal. No rhinorrhea.      Mouth/Throat:      Mouth: Mucous membranes are moist.      Pharynx: No oropharyngeal exudate or posterior oropharyngeal erythema.   Eyes:      General: Scleral icterus present.      Extraocular Movements: Extraocular movements intact.   Neck:      Vascular: No carotid bruit.   Cardiovascular:      Rate and Rhythm: Normal rate. Rhythm irregular.      Heart sounds: Normal heart sounds. No murmur heard.     No friction rub. No gallop.   Pulmonary:      Effort: No respiratory distress.      Breath sounds: No stridor. Wheezing and rhonchi present. No rales.   Chest:      Chest wall: No tenderness.   Abdominal:      General: There is no distension.      Palpations: There is no mass.      Tenderness: There is no abdominal tenderness. There is no right CVA tenderness, left CVA tenderness, guarding or rebound.      Hernia: No hernia is present.   Musculoskeletal:         General: No swelling, tenderness or deformity.      Cervical back: No rigidity.      Right lower leg: Edema present.      Left lower leg: Edema present.   Lymphadenopathy:      Cervical: No cervical adenopathy.   Skin:     Capillary Refill: Capillary refill takes less than 2 seconds.      Coloration: Skin is not jaundiced or pale.      Findings: No rash.   Neurological:      Mental Status: He is alert.      Cranial Nerves: No cranial nerve deficit.      Sensory: No sensory deficit.      Motor: Weakness present.      Coordination: Coordination normal.   Psychiatric:         Mood and Affect: Mood normal.         Behavior: Behavior normal.         Thought Content: Thought content normal.         Judgment:  Judgment normal.                Significant Labs: All pertinent labs within the past 24 hours have been reviewed.  Recent Lab Results         02/22/25  0535   02/21/25  1918   02/21/25  1912 02/21/25  1842        Respiratory Infection Panel Source     NP swab         Adenovirus     Not Detected         Coronavirus 229E, Common Cold Virus     Not Detected         Coronavirus HKU1, Common Cold Virus     Not Detected         Coronavirus NL63, Common Cold Virus     Not Detected         Coronavirus OC43, Common Cold Virus     Not Detected  Comment: The Coronavirus strains detected in this test cause the common cold.  These strains are not the COVID-19 (novel Coronavirus)strain   associated with the respiratory disease outbreak.           Human Metapneumovirus     Not Detected         Human Rhinovirus/Enterovirus     Detected         Influenza A     Not Detected         Influenza B     Not Detected         Parainfluenza Virus 1     Not Detected         Parainfluenza Virus 2     Not Detected         Parainfluenza Virus 3     Not Detected         Parainfluenza Virus 4     Not Detected         Respiratory Syncytial Virus     Not Detected         Bordetella Parapertussis (MI5410)     Not Detected         Bordetella pertussis (ptxP)     Not Detected         Chlamydia pneumoniae     Not Detected         Mycoplasma pneumoniae     Not Detected         Albumin 2.0       2.5       ALP 75       92       ALT 14       16       Ammonia       38       Anion Gap 8       10       AST 32       44       Baso # 0.05       0.05       Basophil % 0.8       0.6       BILIRUBIN TOTAL 5.6  Comment: For infants and newborns, interpretation of results should be based  on gestational age, weight and in agreement with clinical  observations.    Premature Infant recommended reference ranges:  Up to 24 hours.............<8.0 mg/dL  Up to 48 hours............<12.0 mg/dL  3-5 days..................<15.0 mg/dL  6-29 days.................<15.0 mg/dL          6.6  Comment: For infants and newborns, interpretation of results should be based  on gestational age, weight and in agreement with clinical  observations.    Premature Infant recommended reference ranges:  Up to 24 hours.............<8.0 mg/dL  Up to 48 hours............<12.0 mg/dL  3-5 days..................<15.0 mg/dL  6-29 days.................<15.0 mg/dL         BUN 7       5       Calcium 8.0       8.0       Chloride 98       97       CO2 30       28       Creatinine 1.7       1.4       Differential Method Automated       Automated       eGFR 42.6       53.7       Eos # 0.2       0.1       Eos % 3.8       0.8       Glucose 98       107       Gran # (ANC) 3.6       6.6       Gran % 58.9       76.8       Group & Rh   A POS           Hematocrit 31.5       36.4       Hemoglobin 10.2       11.4       Immature Grans (Abs) 0.02  Comment: Mild elevation in immature granulocytes is non specific and   can be seen in a variety of conditions including stress response,   acute inflammation, trauma and pregnancy. Correlation with other   laboratory and clinical findings is essential.         0.02  Comment: Mild elevation in immature granulocytes is non specific and   can be seen in a variety of conditions including stress response,   acute inflammation, trauma and pregnancy. Correlation with other   laboratory and clinical findings is essential.         Immature Granulocytes 0.3       0.2       INDIRECT JAYLEEN   NEG           INR       1.9  Comment: Coumadin Therapy:  2.0 - 3.0 for INR for all indicators except mechanical heart valves  and antiphospholipid syndromes which should use 2.5 - 3.5.         Lymph # 0.9       0.8       Lymph % 14.7       8.8       MCH 30.3       29.3       MCHC 32.4       31.3       MCV 94       94       Mono # 1.3       1.1       Mono % 21.5       12.8       MPV 12.6       12.6       nRBC 0       0       Platelet Count 57       82       Potassium 2.8       2.8       PROTEIN TOTAL 6.3        7.7       PT       20.3       RBC 3.37       3.89       RDW 18.7       18.8       SARS-CoV2 (COVID-19) Qualitative PCR     Not Detected         Sodium 136       135       Specimen Outdate   02/24/2025 23:59           WBC 6.04       8.61               Significant Imaging: I have reviewed all pertinent imaging results/findings within the past 24 hours.

## 2025-02-23 PROBLEM — L03.115 CELLULITIS OF RIGHT LOWER EXTREMITY: Status: ACTIVE | Noted: 2025-02-23

## 2025-02-23 LAB
ALBUMIN SERPL BCP-MCNC: 2.1 G/DL (ref 3.5–5.2)
ALP SERPL-CCNC: 81 U/L (ref 55–135)
ALT SERPL W/O P-5'-P-CCNC: 12 U/L (ref 10–44)
ANION GAP SERPL CALC-SCNC: 7 MMOL/L (ref 8–16)
AST SERPL-CCNC: 39 U/L (ref 10–40)
BASOPHILS # BLD AUTO: 0.05 K/UL (ref 0–0.2)
BASOPHILS NFR BLD: 0.5 % (ref 0–1.9)
BILIRUB SERPL-MCNC: 3.9 MG/DL (ref 0.1–1)
BUN SERPL-MCNC: 12 MG/DL (ref 8–23)
CALCIUM SERPL-MCNC: 8.1 MG/DL (ref 8.7–10.5)
CHLORIDE SERPL-SCNC: 100 MMOL/L (ref 95–110)
CLINICAL BIOCHEMIST REVIEW: NORMAL
CO2 SERPL-SCNC: 26 MMOL/L (ref 23–29)
CREAT SERPL-MCNC: 1.8 MG/DL (ref 0.5–1.4)
DIFFERENTIAL METHOD BLD: ABNORMAL
EOSINOPHIL # BLD AUTO: 0.1 K/UL (ref 0–0.5)
EOSINOPHIL NFR BLD: 0.6 % (ref 0–8)
ERYTHROCYTE [DISTWIDTH] IN BLOOD BY AUTOMATED COUNT: 18.7 % (ref 11.5–14.5)
EST. GFR  (NO RACE VARIABLE): 39.7 ML/MIN/1.73 M^2
GLUCOSE SERPL-MCNC: 102 MG/DL (ref 70–110)
HCT VFR BLD AUTO: 32.7 % (ref 40–54)
HGB BLD-MCNC: 10.5 G/DL (ref 14–18)
IMM GRANULOCYTES # BLD AUTO: 0.03 K/UL (ref 0–0.04)
IMM GRANULOCYTES NFR BLD AUTO: 0.3 % (ref 0–0.5)
LYMPHOCYTES # BLD AUTO: 1.1 K/UL (ref 1–4.8)
LYMPHOCYTES NFR BLD: 11.9 % (ref 18–48)
MAGNESIUM SERPL-MCNC: 1.4 MG/DL (ref 1.6–2.6)
MCH RBC QN AUTO: 29.3 PG (ref 27–31)
MCHC RBC AUTO-ENTMCNC: 32.1 G/DL (ref 32–36)
MCV RBC AUTO: 91 FL (ref 82–98)
MONOCYTES # BLD AUTO: 1.7 K/UL (ref 0.3–1)
MONOCYTES NFR BLD: 18.3 % (ref 4–15)
NEUTROPHILS # BLD AUTO: 6.5 K/UL (ref 1.8–7.7)
NEUTROPHILS NFR BLD: 68.4 % (ref 38–73)
NRBC BLD-RTO: 0 /100 WBC
PLATELET # BLD AUTO: 58 K/UL (ref 150–450)
PLPETH BLD-MCNC: <10 NG/ML
PMV BLD AUTO: 13 FL (ref 9.2–12.9)
POPETH BLD-MCNC: <10 NG/ML
POTASSIUM SERPL-SCNC: 3.2 MMOL/L (ref 3.5–5.1)
PROT SERPL-MCNC: 6.7 G/DL (ref 6–8.4)
RBC # BLD AUTO: 3.58 M/UL (ref 4.6–6.2)
SODIUM SERPL-SCNC: 133 MMOL/L (ref 136–145)
WBC # BLD AUTO: 9.5 K/UL (ref 3.9–12.7)

## 2025-02-23 PROCEDURE — 25000242 PHARM REV CODE 250 ALT 637 W/ HCPCS: Performed by: INTERNAL MEDICINE

## 2025-02-23 PROCEDURE — 27000221 HC OXYGEN, UP TO 24 HOURS

## 2025-02-23 PROCEDURE — 63600175 PHARM REV CODE 636 W HCPCS: Performed by: INTERNAL MEDICINE

## 2025-02-23 PROCEDURE — 25000003 PHARM REV CODE 250: Performed by: INTERNAL MEDICINE

## 2025-02-23 PROCEDURE — 99900035 HC TECH TIME PER 15 MIN (STAT)

## 2025-02-23 PROCEDURE — 83735 ASSAY OF MAGNESIUM: CPT | Performed by: INTERNAL MEDICINE

## 2025-02-23 PROCEDURE — 97530 THERAPEUTIC ACTIVITIES: CPT

## 2025-02-23 PROCEDURE — 25000003 PHARM REV CODE 250: Performed by: EMERGENCY MEDICINE

## 2025-02-23 PROCEDURE — 80053 COMPREHEN METABOLIC PANEL: CPT | Performed by: INTERNAL MEDICINE

## 2025-02-23 PROCEDURE — 94761 N-INVAS EAR/PLS OXIMETRY MLT: CPT

## 2025-02-23 PROCEDURE — 5A1D70Z PERFORMANCE OF URINARY FILTRATION, INTERMITTENT, LESS THAN 6 HOURS PER DAY: ICD-10-PCS | Performed by: INTERNAL MEDICINE

## 2025-02-23 PROCEDURE — 94640 AIRWAY INHALATION TREATMENT: CPT

## 2025-02-23 PROCEDURE — 99900031 HC PATIENT EDUCATION (STAT)

## 2025-02-23 PROCEDURE — 27000207 HC ISOLATION

## 2025-02-23 PROCEDURE — 11000001 HC ACUTE MED/SURG PRIVATE ROOM

## 2025-02-23 PROCEDURE — 97165 OT EVAL LOW COMPLEX 30 MIN: CPT

## 2025-02-23 PROCEDURE — 85025 COMPLETE CBC W/AUTO DIFF WBC: CPT | Performed by: INTERNAL MEDICINE

## 2025-02-23 PROCEDURE — 36415 COLL VENOUS BLD VENIPUNCTURE: CPT | Performed by: INTERNAL MEDICINE

## 2025-02-23 RX ORDER — POTASSIUM CHLORIDE 20 MEQ/1
40 TABLET, EXTENDED RELEASE ORAL 2 TIMES DAILY
Status: COMPLETED | OUTPATIENT
Start: 2025-02-23 | End: 2025-02-24

## 2025-02-23 RX ORDER — CEFTRIAXONE 1 G/1
1 INJECTION, POWDER, FOR SOLUTION INTRAMUSCULAR; INTRAVENOUS
Status: COMPLETED | OUTPATIENT
Start: 2025-02-23 | End: 2025-03-01

## 2025-02-23 RX ORDER — FUROSEMIDE 40 MG/1
80 TABLET ORAL DAILY
Status: DISCONTINUED | OUTPATIENT
Start: 2025-02-24 | End: 2025-02-23

## 2025-02-23 RX ORDER — FUROSEMIDE 40 MG/1
80 TABLET ORAL DAILY
Status: DISCONTINUED | OUTPATIENT
Start: 2025-02-23 | End: 2025-02-24

## 2025-02-23 RX ORDER — FAMOTIDINE 20 MG/1
20 TABLET, FILM COATED ORAL DAILY
Status: DISCONTINUED | OUTPATIENT
Start: 2025-02-23 | End: 2025-03-05 | Stop reason: HOSPADM

## 2025-02-23 RX ORDER — MAGNESIUM SULFATE HEPTAHYDRATE 40 MG/ML
2 INJECTION, SOLUTION INTRAVENOUS ONCE
Status: COMPLETED | OUTPATIENT
Start: 2025-02-23 | End: 2025-02-23

## 2025-02-23 RX ORDER — MAGNESIUM CHLORIDE 64 MG
128 TABLET, DELAYED RELEASE (ENTERIC COATED) ORAL DAILY
Status: DISCONTINUED | OUTPATIENT
Start: 2025-02-23 | End: 2025-03-05 | Stop reason: HOSPADM

## 2025-02-23 RX ORDER — MIDODRINE HYDROCHLORIDE 5 MG/1
5 TABLET ORAL EVERY 8 HOURS
Status: DISCONTINUED | OUTPATIENT
Start: 2025-02-23 | End: 2025-03-05 | Stop reason: HOSPADM

## 2025-02-23 RX ORDER — POTASSIUM CHLORIDE 20 MEQ/1
40 TABLET, EXTENDED RELEASE ORAL DAILY
Status: DISCONTINUED | OUTPATIENT
Start: 2025-02-24 | End: 2025-02-23

## 2025-02-23 RX ORDER — THIAMINE HCL 100 MG
100 TABLET ORAL DAILY
Status: DISCONTINUED | OUTPATIENT
Start: 2025-02-23 | End: 2025-03-05 | Stop reason: HOSPADM

## 2025-02-23 RX ORDER — POTASSIUM CHLORIDE 20 MEQ/1
40 TABLET, EXTENDED RELEASE ORAL 2 TIMES DAILY
Status: DISCONTINUED | OUTPATIENT
Start: 2025-02-23 | End: 2025-02-23

## 2025-02-23 RX ADMIN — POTASSIUM CHLORIDE 40 MEQ: 1500 TABLET, EXTENDED RELEASE ORAL at 10:02

## 2025-02-23 RX ADMIN — Medication 100 MG: at 10:02

## 2025-02-23 RX ADMIN — CEFTRIAXONE SODIUM 1 G: 1 INJECTION, POWDER, FOR SOLUTION INTRAMUSCULAR; INTRAVENOUS at 10:02

## 2025-02-23 RX ADMIN — METOPROLOL TARTRATE 25 MG: 25 TABLET, FILM COATED ORAL at 10:02

## 2025-02-23 RX ADMIN — MIDODRINE HYDROCHLORIDE 5 MG: 5 TABLET ORAL at 02:02

## 2025-02-23 RX ADMIN — LEVALBUTEROL 1.25 MG: 1.25 SOLUTION, CONCENTRATE RESPIRATORY (INHALATION) at 06:02

## 2025-02-23 RX ADMIN — LEVALBUTEROL 1.25 MG: 1.25 SOLUTION, CONCENTRATE RESPIRATORY (INHALATION) at 03:02

## 2025-02-23 RX ADMIN — DICLOFENAC SODIUM 4 G: 10 GEL TOPICAL at 10:02

## 2025-02-23 RX ADMIN — MAGNESIUM SULFATE HEPTAHYDRATE 2 G: 40 INJECTION, SOLUTION INTRAVENOUS at 06:02

## 2025-02-23 RX ADMIN — LACTULOSE 30 G: 20 SOLUTION ORAL at 10:02

## 2025-02-23 RX ADMIN — TRAMADOL HYDROCHLORIDE 50 MG: 50 TABLET, COATED ORAL at 06:02

## 2025-02-23 RX ADMIN — FAMOTIDINE 20 MG: 20 TABLET, FILM COATED ORAL at 10:02

## 2025-02-23 RX ADMIN — TAMSULOSIN HYDROCHLORIDE 0.4 MG: 0.4 CAPSULE ORAL at 10:02

## 2025-02-23 RX ADMIN — MUPIROCIN: 20 OINTMENT TOPICAL at 10:02

## 2025-02-23 RX ADMIN — MIDODRINE HYDROCHLORIDE 10 MG: 5 TABLET ORAL at 05:02

## 2025-02-23 RX ADMIN — MIDODRINE HYDROCHLORIDE 5 MG: 5 TABLET ORAL at 10:02

## 2025-02-23 RX ADMIN — MAGNESIUM 64 MG (MAGNESIUM CHLORIDE) TABLET,DELAYED RELEASE 128 MG: at 10:02

## 2025-02-23 RX ADMIN — LEVALBUTEROL 1.25 MG: 1.25 SOLUTION, CONCENTRATE RESPIRATORY (INHALATION) at 07:02

## 2025-02-23 RX ADMIN — DICLOFENAC SODIUM 4 G: 10 GEL TOPICAL at 03:02

## 2025-02-23 RX ADMIN — TRAMADOL HYDROCHLORIDE 50 MG: 50 TABLET, COATED ORAL at 10:02

## 2025-02-23 RX ADMIN — FUROSEMIDE 80 MG: 40 TABLET ORAL at 01:02

## 2025-02-23 RX ADMIN — LEVALBUTEROL 1.25 MG: 1.25 SOLUTION, CONCENTRATE RESPIRATORY (INHALATION) at 11:02

## 2025-02-23 RX ADMIN — LACTULOSE 30 G: 20 SOLUTION ORAL at 02:02

## 2025-02-23 NOTE — ASSESSMENT & PLAN NOTE
Patient with known Cirrhosis with Child's class C. Co-morbidities are present and inclusive of portal hypertension, hepatic encephalopathy, malnutrition, anemia/pancytopenia, and anticoagulation.  MELD-Na score calculated; MELD 3.0: 28 at 2/23/2025  5:15 AM  MELD-Na: 26 at 2/23/2025  5:15 AM  Calculated from:  Serum Creatinine: 1.8 mg/dL at 2/23/2025  5:15 AM  Serum Sodium: 133 mmol/L at 2/23/2025  5:15 AM  Total Bilirubin: 3.9 mg/dL at 2/23/2025  5:15 AM  Serum Albumin: 2.1 g/dL at 2/23/2025  5:15 AM  INR(ratio): 1.9 at 2/21/2025  6:42 PM  Age at listing (hypothetical): 69 years  Sex: Male at 2/23/2025  5:15 AM      Continue chronic meds. Etiology likely ETOH. Will avoid any hepatotoxic meds, and monitor CBC/CMP/INR for synthetic function.

## 2025-02-23 NOTE — ASSESSMENT & PLAN NOTE
Creatine stable for now. BMP reviewed- noted Estimated Creatinine Clearance: 53.5 mL/min (A) (based on SCr of 1.8 mg/dL (H)). according to latest data. Based on current GFR, CKD stage is stage 5 - GFR < 15.  Monitor UOP and serial BMP and adjust therapy as needed. Renally dose meds. Avoid nephrotoxic medications and procedures.    Per renal, currently on MWF HD, ? Still needs this?

## 2025-02-23 NOTE — HOSPITAL COURSE
2/23 FM:  Patient complaining of a area of redness and pain on the right thigh above the knee.  This started yesterday.  Patient's exam consistent with a area of focal cellulitis and induration.  Ordering ultrasound and starting antibiotics.  Patient's respiratory complaints are much improved and getting back to his baseline.  Patient's nephrologist aware he is in house and may need hemodialysis in a.m..    2/24 DL:  Patient doing well this morning.  He is sitting up in bed in his awake, alert, oriented.  Patient reports respiratory status improved.  Patient now off of supplemental O2.  Patient reports continued tenderness to area of redness to right inner thigh.  Redness and induration have spread past previously marked borders.  Patient is scheduled for ultrasound today.  Continue IV antibiotics.  Patient is scheduled for hemodialysis today.  Nephrology following.    2/25 DL:  Patient doing well this morning.  He is lying in bed in his awake, alert, oriented.  Respiratory status with continued improvement.  Labs and vital signs stable.  Ultrasound right thigh obtained yesterday reveals 12.6 x 6.1 x 3.0 cm complex fluid collection to the medial right thigh.  Venous ultrasound negative for DVT.  General surgery consulted.  Continue current IV antibiotics.  Renal function stable.  Nephrology following.    2/26 DL:  Patient doing well this morning.  He is lying in bed in his awake, alert, oriented.  Respiratory status at baseline.  Vital signs stable.  Patient afebrile without leukocytosis.  Continue current antibiotic treatment for abscess to right thigh.  General surgery consulted.  Appreciate recs.  Hypokalemia and hypomagnesemia noted this morning.  We will replenish.  Nephrology following.  Appreciate recs.    2/27 DL:  Patient lying in bed resting comfortably.  He has just returned to room from OR for I and D of right inner thigh.  Patient tolerated procedure well.  Patient is afebrile without leukocytosis this  morning.  We will continue current IV antibiotic treatment for right thigh abscess pending results of culture obtained per general surgery this morning.  Patient's potassium 2.8 this morning.  Continue p.o. replacement.  We will also give IV replacement today.  Magnesium pending.  Labs and vital signs otherwise stable.  Renal function stable.  Nephrology following.    2/28 DL:  Patient is sitting up on side of bed eating breakfast.  He endorses pain to right inner thigh I&D site that is controlled with current regimen.  Wound cultures with no growth to date x1 day.  Continue current antibiotic treatment.  Continued hypokalemia noted.  Continue p.o. replacement t.i.d.  We we will also give IV replacement today.  Hypomagnesemia also noted.  We will replenish today.  Renal function stable.  Nephrology following.  Labs and vital signs otherwise stable.  Patient with continued improved respiratory status.  Continue discharge planning.    3/1 DL:  Patient doing well this morning.  He is lying in bed is awake, alert, oriented.  Patient reports pain to right thigh controlled with current regimen.  Scant drainage from I&D site.  Patient is afebrile without leukocytosis.  Anaerobic culture pending.  Aerobic culture with no growth to date.  Continue current IV antibiotics.  Respiratory status stable.  Patient with continued hypokalemia.  We will switch p.o. potassium to effervescent potassium.  Additional K riders given.  Continue monitoring with daily labs.  Continue discharge planning.      3/2 DL:  Patient lying bed awake oriented.  POD #3 I&D right thigh.  He reports pain to right thigh controlled.  Patient states he has been able to ambulate in the room without difficulty.  Scant serous drainage noted from Penrose drain.  Wound cultures with no growth and pending.  Continue current IV antibiotics.  Patient afebrile without leukocytosis.  Remaining labs pending.  We will follow up once completed and continue to replace K  hypokalemia continues.  Continue current treatment plan.  Continue discharge planning.    3/3 DL:  Patient lying in bed and is awake, alert, oriented.  Pod 4. Right thigh I&D.  Pain control.  Wound cultures pending and was IV antibiotics.  Patient afebrile without leukocytosis to 3.3.  Mag 1.3.  We will replenish.  Continue current treatment plan.  Continue discharge planning.  Patient will need follow up with General surgery in outpatient setting.    3/4 DL:  Patient doing well this morning.  He is lying in bed and is awake, alert oriented.  POD#5 right thigh I&D.  Pain controlled.  Scant drainage from Penrose drain noted.  Wound cultures with no growth and pending.  General surgery following.  Appreciate recs.  Patient afebrile without leukocytosis.  Continue current IV antibiotics.  Potassium and magnesium within normal limits today.  Continue monitoring with daily labs.  Continue discharge planning.    3/5 DL:  Well this morning.  He is lying in bed is awake, alert, oriented.  Pain controlled to right thigh I&D site.  Scant drainage noted from Penrose drain.  Wound cultures with no growth.  Patient afebrile without leukocytosis.  He has completed IV antibiotic therapy.  Electrolytes within normal in its.  We will discharge patient home with home health today.  We will recheck labs weekly with home health.  Patient to follow up with General surgery in approximately 1 week for Penrose drain removal.  Patient will need outpatient follow up with Nephrology as well.

## 2025-02-23 NOTE — SUBJECTIVE & OBJECTIVE
Past Medical History:   Diagnosis Date    Atrial fibrillation     Bulging disc     Cirrhosis     Colon polyp 2017    Rpt 5 yrs    EV (esophageal varices)     Hypertension 2023    Renal disorder     Skin cancer 2017    Thrombocytopenia        Past Surgical History:   Procedure Laterality Date    CATHETERIZATION OF BOTH LEFT AND RIGHT HEART N/A 2023    Procedure: CATHETERIZATION, HEART, BOTH LEFT AND RIGHT;  Surgeon: Flo Malone MD;  Location: [facility] CATH LAB;  Service: Cardiology;  Laterality: N/A;  low bleeding risk 1.0%    CHOLECYSTECTOMY      COLONOSCOPY      COLONOSCOPY N/A 2017    ta rpt     CORONARY ANGIOGRAPHY N/A 2023    Procedure: ANGIOGRAM, CORONARY ARTERY;  Surgeon: Flo Malone MD;  Location: [facility] CATH LAB;  Service: Cardiology;  Laterality: N/A;    HERNIA REPAIR      SKIN BIOPSY  2017    TONSILLECTOMY      UPPER GASTROINTESTINAL ENDOSCOPY      EV    UPPER GASTROINTESTINAL ENDOSCOPY  2016    VASECTOMY         Review of patient's allergies indicates:  No Known Allergies    No current facility-administered medications on file prior to encounter.     Current Outpatient Medications on File Prior to Encounter   Medication Sig    famotidine (PEPCID) 20 MG tablet TAKE 1 TABLET BY MOUTH EVERY OTHER DAY    furosemide (LASIX) 80 MG tablet Take 80 mg by mouth every Tuesday, Thursday, Saturday, .    lactulose (CHRONULAC) 20 gram/30 mL Soln Take 45 mLs (30 g total) by mouth 3 (three) times daily. TITRATE TO 3-4 BOWEL MOVEMENTS DAILY.    metoprolol tartrate (LOPRESSOR) 25 MG tablet TAKE 0.5 TABLETS BY MOUTH 2 TIMES DAILY.    midodrine (PROAMATINE) 5 MG Tab Take 3 tablets (15 mg total) by mouth every 8 (eight) hours.    potassium chloride (K-TAB) 20 mEq SMARTSI Tablet(s) By Mouth    tamsulosin (FLOMAX) 0.4 mg Cap Take by mouth.    tuberculin,purif.prot.deriv. (TUBERSOL IDRM) Inject 0.1 mLs into the skin.     Family History       Problem Relation (Age of Onset)     Cancer Maternal Uncle    Heart disease Maternal Uncle    Hyperlipidemia Brother    Ovarian cancer Sister          Tobacco Use    Smoking status: Never     Passive exposure: Never    Smokeless tobacco: Never   Substance and Sexual Activity    Alcohol use: Not Currently     Alcohol/week: 0.0 standard drinks of alcohol    Drug use: No    Sexual activity: Not Currently     Review of Systems   Constitutional:  Positive for activity change, appetite change and fatigue. Negative for chills and fever.   HENT:  Negative for ear pain, mouth sores, nosebleeds and sore throat.    Eyes:  Negative for visual disturbance.   Respiratory:  Positive for cough, shortness of breath and wheezing. Negative for chest tightness and stridor.    Cardiovascular:  Positive for leg swelling. Negative for chest pain and palpitations.   Gastrointestinal:  Negative for abdominal distention, abdominal pain, blood in stool, constipation, diarrhea, nausea and vomiting.   Endocrine: Negative for polyphagia.   Genitourinary:  Positive for difficulty urinating. Negative for dysuria, flank pain and frequency.   Musculoskeletal:  Positive for gait problem. Negative for myalgias.   Skin:  Positive for color change and rash.   Neurological:  Positive for weakness. Negative for dizziness, tremors, seizures, syncope, facial asymmetry, speech difficulty and headaches.   Hematological:  Negative for adenopathy.   Psychiatric/Behavioral:  Positive for confusion. Negative for agitation and hallucinations. The patient is not nervous/anxious.      Objective:     Vital Signs (Most Recent):  Temp: 98.9 °F (37.2 °C) (02/23/25 0429)  Pulse: 94 (02/23/25 0746)  Resp: 20 (02/23/25 0746)  BP: 121/64 (02/23/25 0429)  SpO2: 99 % (02/23/25 0746) Vital Signs (24h Range):  Temp:  [98.9 °F (37.2 °C)-99.6 °F (37.6 °C)] 98.9 °F (37.2 °C)  Pulse:  [] 94  Resp:  [17-20] 20  SpO2:  [96 %-99 %] 99 %  BP: (119-139)/(53-92) 121/64     Weight: 131.5 kg (290 lb)  Body mass index is  38.26 kg/m².     Physical Exam  Vitals and nursing note reviewed.   Constitutional:       General: He is not in acute distress.     Appearance: He is obese. He is ill-appearing. He is not diaphoretic.   HENT:      Head: Normocephalic and atraumatic.      Right Ear: External ear normal.      Left Ear: External ear normal.      Nose: Nose normal. No rhinorrhea.      Mouth/Throat:      Mouth: Mucous membranes are moist.      Pharynx: No oropharyngeal exudate or posterior oropharyngeal erythema.   Eyes:      General: Scleral icterus present.      Extraocular Movements: Extraocular movements intact.   Neck:      Vascular: No carotid bruit.   Cardiovascular:      Rate and Rhythm: Normal rate. Rhythm irregular.      Heart sounds: Normal heart sounds. No murmur heard.     No friction rub. No gallop.   Pulmonary:      Effort: No respiratory distress.      Breath sounds: No stridor. Wheezing and rhonchi present. No rales.   Chest:      Chest wall: No tenderness.   Abdominal:      General: There is no distension.      Palpations: There is no mass.      Tenderness: There is no abdominal tenderness. There is no right CVA tenderness, left CVA tenderness, guarding or rebound.      Hernia: No hernia is present.   Musculoskeletal:         General: No swelling, tenderness or deformity.      Cervical back: No rigidity.      Right lower leg: Edema present.      Left lower leg: Edema present.   Lymphadenopathy:      Cervical: No cervical adenopathy.   Skin:     General: Skin is warm.      Capillary Refill: Capillary refill takes less than 2 seconds.      Coloration: Skin is jaundiced.      Findings: Erythema and rash present.          Neurological:      Mental Status: He is alert.      Cranial Nerves: No cranial nerve deficit.      Sensory: No sensory deficit.      Motor: Weakness present.      Coordination: Coordination normal.   Psychiatric:         Mood and Affect: Mood normal.         Behavior: Behavior normal.         Thought  Content: Thought content normal.         Judgment: Judgment normal.                Significant Labs: All pertinent labs within the past 24 hours have been reviewed.  Recent Lab Results         02/23/25  0515   02/22/25  1500        Albumin 2.1         ALP 81         ALT 12         Anion Gap 7         Appearance, UA   Cloudy       AST 39         Bacteria, UA   None       Baso # 0.05         Basophil % 0.5         Bilirubin (UA)   1+  Comment: Positive urine bilirubin is not confirmed. Correlate with   serum bilirubin and clinical presentation.         BILIRUBIN TOTAL 3.9  Comment: For infants and newborns, interpretation of results should be based  on gestational age, weight and in agreement with clinical  observations.    Premature Infant recommended reference ranges:  Up to 24 hours.............<8.0 mg/dL  Up to 48 hours............<12.0 mg/dL  3-5 days..................<15.0 mg/dL  6-29 days.................<15.0 mg/dL           BUN 12         Calcium 8.1         Chloride 100         CO2 26         Color, UA   Yellow       Creatinine 1.8         Differential Method Automated         eGFR 39.7         Eos # 0.1         Eos % 0.6         Glucose 102         Glucose, UA   Negative       Gran # (ANC) 6.5         Gran % 68.4         Hematocrit 32.7         Hemoglobin 10.5         Hyaline Casts, UA   10.3       Immature Grans (Abs) 0.03  Comment: Mild elevation in immature granulocytes is non specific and   can be seen in a variety of conditions including stress response,   acute inflammation, trauma and pregnancy. Correlation with other   laboratory and clinical findings is essential.           Immature Granulocytes 0.3         Ketones, UA   Negative       Leukocyte Esterase, UA   2+       Lymph # 1.1         Lymph % 11.9         Magnesium  1.4         MCH 29.3         MCHC 32.1         MCV 91         Microscopic Comment   SEE COMMENT  Comment: Other formed elements not mentioned in the report are not   present in the  microscopic examination.          Mono # 1.7         Mono % 18.3         MPV 13.0         NITRITE UA   Positive       nRBC 0         Blood, UA   2+       Other (U/A)   Moderate fungal hyphae       pH, UA   6.0       Platelet Count 58         Potassium 3.2         PROTEIN TOTAL 6.7         Protein, UA   1+  Comment: Recommend a 24 hour urine protein or a urine   protein/creatinine ratio if globulin induced proteinuria is  clinically suspected.         RBC 3.58         RBC, UA   23       RDW 18.7         Sodium 133         Spec Grav UA   1.015       Specimen UA   Urine, Clean Catch       Squam Epithel, UA   1       UROBILINOGEN UA   Negative       WBC, UA   >100       WBC 9.50         Yeast, UA   Few               Significant Imaging: I have reviewed all pertinent imaging results/findings within the past 24 hours.

## 2025-02-23 NOTE — CARE UPDATE
02/23/25 1136   PRE-TX-O2   Device (Oxygen Therapy) nasal cannula   Flow (L/min) (Oxygen Therapy) 1   Oxygen Concentration (%) 24   SpO2 98 %

## 2025-02-23 NOTE — ASSESSMENT & PLAN NOTE
Patient has long standing persistent (>12 months) atrial fibrillation. Patient is currently in atrial fibrillation. [id number] Score: 1. The patients heart rate in the last 24 hours is as follows:  Pulse  Min: 88  Max: 113     Antiarrhythmics  metoprolol tartrate (LOPRESSOR) tablet 25 mg, 2 times daily, Oral    Anticoagulants       Plan  - Replete lytes with a goal of K>4, Mg >2  - Patient is anticoagulated, see medications listed above.  - Patient's afib is currently controlled    Auto anticoagulated with CLD.

## 2025-02-23 NOTE — PROGRESS NOTES
[facility] Medicine  Progress Note    Patient Name: Juan Carlos Yoo Sr.  MRN: [id number]  Patient Class: IP- Inpatient   Admission Date: 2/21/2025  Length of Stay: 1 days  Attending Physician: Joao Villegas III, MD  Primary Care Provider: Joao Villegas III, MD        Subjective     Principal Problem:Rhinovirus infection        HPI:  ED HPI:  Juan Carlos Yoo Sr. is a 69 y.o. male with PMHX of alcoholic cirrhosis, HCC, thrombocytopenia, Afib (not on anticoagulation due to thrombocytopenia), ESRD on HD M/W/F who presents to the emergency department C/O SOB.     Patient was recently  admitted to [facility] due to severe anasarca JAE, evaluated for hepatorenal syndrome and had 40 day hospitalization.  Patient released from rehab about a week ago.  Has endorse increasing shortness of breath for the past 3 days.  States at night it is worse when trying to sleep.  Patient lays on his side and uses 3 pillows to prop his head up.  Patient went to urgent care today because he felt like tonight was going to be another bad night and they sent him to ER.  He denies fever.  States swelling is about baseline.  Had typical dialysis session today.  He reports they were trying to take off 3 L but is unsure of how much volume was removed.  He makes urine but states it is difficult to urinate due to anasarca.  He states compliance to low-sodium diet and fluid restriction since recent discharge.  Has not had alcohol in over 7 months.    IM HPI:  Patient's chart reviewed and above history noted.  Patient's been admitted for acute on chronic dyspnea and was diagnosed with rhinovirus.  Patient had been having increasing dyspnea weakness coughing with clear sputum production over the last 36 hours and ultimately presented with worsening shortness of breath.  He has been placed on oxygen nasal cannula states he is feeling much better.  Patient has a mild cough and wheezing on exam.  Patient has a complex  medical history that is has been reviewed he seems slightly hypervolemic we will notify his nephrologist and he will likely need his routine hemodialysis if he ends up staying through Monday.  Patient is afebrile labs noted potassium low we will replete and maybe a good candidate for Aldactone.    Overview/Hospital Course:  2/23 FM:  Patient complaining of a area of redness and pain on the right thigh above the knee.  This started yesterday.  Patient's exam consistent with a area of focal cellulitis and induration.  Ordering ultrasound and starting antibiotics.  Patient's respiratory complaints are much improved and getting back to his baseline.  Patient's nephrologist aware he is in house and may need hemodialysis in a.m..    Past Medical History:   Diagnosis Date    Atrial fibrillation     Bulging disc     Cirrhosis     Colon polyp 12/29/2017    Rpt 5 yrs    EV (esophageal varices)     Hypertension 03/30/2023    Renal disorder     Skin cancer 03/2017    Thrombocytopenia        Past Surgical History:   Procedure Laterality Date    CATHETERIZATION OF BOTH LEFT AND RIGHT HEART N/A 2/8/2023    Procedure: CATHETERIZATION, HEART, BOTH LEFT AND RIGHT;  Surgeon: Flo Malone MD;  Location: [facility] CATH LAB;  Service: Cardiology;  Laterality: N/A;  low bleeding risk 1.0%    CHOLECYSTECTOMY      COLONOSCOPY      COLONOSCOPY N/A 12/29/2017    ta rpt 2022    CORONARY ANGIOGRAPHY N/A 2/8/2023    Procedure: ANGIOGRAM, CORONARY ARTERY;  Surgeon: Flo Malone MD;  Location: [facility] CATH LAB;  Service: Cardiology;  Laterality: N/A;    HERNIA REPAIR      SKIN BIOPSY  03/2017    TONSILLECTOMY      UPPER GASTROINTESTINAL ENDOSCOPY  2011    EV    UPPER GASTROINTESTINAL ENDOSCOPY  2016    VASECTOMY         Review of patient's allergies indicates:  No Known Allergies    No current facility-administered medications on file prior to encounter.     Current Outpatient Medications on File Prior to Encounter   Medication Sig    famotidine  (PEPCID) 20 MG tablet TAKE 1 TABLET BY MOUTH EVERY OTHER DAY    furosemide (LASIX) 80 MG tablet Take 80 mg by mouth every Tuesday, Thursday, Saturday, .    lactulose (CHRONULAC) 20 gram/30 mL Soln Take 45 mLs (30 g total) by mouth 3 (three) times daily. TITRATE TO 3-4 BOWEL MOVEMENTS DAILY.    metoprolol tartrate (LOPRESSOR) 25 MG tablet TAKE 0.5 TABLETS BY MOUTH 2 TIMES DAILY.    midodrine (PROAMATINE) 5 MG Tab Take 3 tablets (15 mg total) by mouth every 8 (eight) hours.    potassium chloride (K-TAB) 20 mEq SMARTSI Tablet(s) By Mouth    tamsulosin (FLOMAX) 0.4 mg Cap Take by mouth.    tuberculin,purif.prot.deriv. (TUBERSOL IDRM) Inject 0.1 mLs into the skin.     Family History       Problem Relation (Age of Onset)    Cancer Maternal Uncle    Heart disease Maternal Uncle    Hyperlipidemia Brother    Ovarian cancer Sister          Tobacco Use    Smoking status: Never     Passive exposure: Never    Smokeless tobacco: Never   Substance and Sexual Activity    Alcohol use: Not Currently     Alcohol/week: 0.0 standard drinks of alcohol    Drug use: No    Sexual activity: Not Currently     Review of Systems   Constitutional:  Positive for activity change, appetite change and fatigue. Negative for chills and fever.   HENT:  Negative for ear pain, mouth sores, nosebleeds and sore throat.    Eyes:  Negative for visual disturbance.   Respiratory:  Positive for cough, shortness of breath and wheezing. Negative for chest tightness and stridor.    Cardiovascular:  Positive for leg swelling. Negative for chest pain and palpitations.   Gastrointestinal:  Negative for abdominal distention, abdominal pain, blood in stool, constipation, diarrhea, nausea and vomiting.   Endocrine: Negative for polyphagia.   Genitourinary:  Positive for difficulty urinating. Negative for dysuria, flank pain and frequency.   Musculoskeletal:  Positive for gait problem. Negative for myalgias.   Skin:  Positive for color change and rash.    Neurological:  Positive for weakness. Negative for dizziness, tremors, seizures, syncope, facial asymmetry, speech difficulty and headaches.   Hematological:  Negative for adenopathy.   Psychiatric/Behavioral:  Positive for confusion. Negative for agitation and hallucinations. The patient is not nervous/anxious.      Objective:     Vital Signs (Most Recent):  Temp: 98.9 °F (37.2 °C) (02/23/25 0429)  Pulse: 94 (02/23/25 0746)  Resp: 20 (02/23/25 0746)  BP: 121/64 (02/23/25 0429)  SpO2: 99 % (02/23/25 0746) Vital Signs (24h Range):  Temp:  [98.9 °F (37.2 °C)-99.6 °F (37.6 °C)] 98.9 °F (37.2 °C)  Pulse:  [] 94  Resp:  [17-20] 20  SpO2:  [96 %-99 %] 99 %  BP: (119-139)/(53-92) 121/64     Weight: 131.5 kg (290 lb)  Body mass index is 38.26 kg/m².     Physical Exam  Vitals and nursing note reviewed.   Constitutional:       General: He is not in acute distress.     Appearance: He is obese. He is ill-appearing. He is not diaphoretic.   HENT:      Head: Normocephalic and atraumatic.      Right Ear: External ear normal.      Left Ear: External ear normal.      Nose: Nose normal. No rhinorrhea.      Mouth/Throat:      Mouth: Mucous membranes are moist.      Pharynx: No oropharyngeal exudate or posterior oropharyngeal erythema.   Eyes:      General: Scleral icterus present.      Extraocular Movements: Extraocular movements intact.   Neck:      Vascular: No carotid bruit.   Cardiovascular:      Rate and Rhythm: Normal rate. Rhythm irregular.      Heart sounds: Normal heart sounds. No murmur heard.     No friction rub. No gallop.   Pulmonary:      Effort: No respiratory distress.      Breath sounds: No stridor. Wheezing and rhonchi present. No rales.   Chest:      Chest wall: No tenderness.   Abdominal:      General: There is no distension.      Palpations: There is no mass.      Tenderness: There is no abdominal tenderness. There is no right CVA tenderness, left CVA tenderness, guarding or rebound.      Hernia: No hernia  is present.   Musculoskeletal:         General: No swelling, tenderness or deformity.      Cervical back: No rigidity.      Right lower leg: Edema present.      Left lower leg: Edema present.   Lymphadenopathy:      Cervical: No cervical adenopathy.   Skin:     General: Skin is warm.      Capillary Refill: Capillary refill takes less than 2 seconds.      Coloration: Skin is jaundiced.      Findings: Erythema and rash present.          Neurological:      Mental Status: He is alert.      Cranial Nerves: No cranial nerve deficit.      Sensory: No sensory deficit.      Motor: Weakness present.      Coordination: Coordination normal.   Psychiatric:         Mood and Affect: Mood normal.         Behavior: Behavior normal.         Thought Content: Thought content normal.         Judgment: Judgment normal.                Significant Labs: All pertinent labs within the past 24 hours have been reviewed.  Recent Lab Results         02/23/25  0515   02/22/25  1500        Albumin 2.1         ALP 81         ALT 12         Anion Gap 7         Appearance, UA   Cloudy       AST 39         Bacteria, UA   None       Baso # 0.05         Basophil % 0.5         Bilirubin (UA)   1+  Comment: Positive urine bilirubin is not confirmed. Correlate with   serum bilirubin and clinical presentation.         BILIRUBIN TOTAL 3.9  Comment: For infants and newborns, interpretation of results should be based  on gestational age, weight and in agreement with clinical  observations.    Premature Infant recommended reference ranges:  Up to 24 hours.............<8.0 mg/dL  Up to 48 hours............<12.0 mg/dL  3-5 days..................<15.0 mg/dL  6-29 days.................<15.0 mg/dL           BUN 12         Calcium 8.1         Chloride 100         CO2 26         Color, UA   Yellow       Creatinine 1.8         Differential Method Automated         eGFR 39.7         Eos # 0.1         Eos % 0.6         Glucose 102         Glucose, UA   Negative        Gran # (ANC) 6.5         Gran % 68.4         Hematocrit 32.7         Hemoglobin 10.5         Hyaline Casts, UA   10.3       Immature Grans (Abs) 0.03  Comment: Mild elevation in immature granulocytes is non specific and   can be seen in a variety of conditions including stress response,   acute inflammation, trauma and pregnancy. Correlation with other   laboratory and clinical findings is essential.           Immature Granulocytes 0.3         Ketones, UA   Negative       Leukocyte Esterase, UA   2+       Lymph # 1.1         Lymph % 11.9         Magnesium  1.4         MCH 29.3         MCHC 32.1         MCV 91         Microscopic Comment   SEE COMMENT  Comment: Other formed elements not mentioned in the report are not   present in the microscopic examination.          Mono # 1.7         Mono % 18.3         MPV 13.0         NITRITE UA   Positive       nRBC 0         Blood, UA   2+       Other (U/A)   Moderate fungal hyphae       pH, UA   6.0       Platelet Count 58         Potassium 3.2         PROTEIN TOTAL 6.7         Protein, UA   1+  Comment: Recommend a 24 hour urine protein or a urine   protein/creatinine ratio if globulin induced proteinuria is  clinically suspected.         RBC 3.58         RBC, UA   23       RDW 18.7         Sodium 133         Spec Grav UA   1.015       Specimen UA   Urine, Clean Catch       Squam Epithel, UA   1       UROBILINOGEN UA   Negative       WBC, UA   >100       WBC 9.50         Yeast, UA   Few               Significant Imaging: I have reviewed all pertinent imaging results/findings within the past 24 hours.    Assessment and Plan     * Rhinovirus infection  Conservative care, + now with O2 needs.      Cellulitis of right lower extremity  2/23 FM:  Starting abx, get US.      Morbid obesity  Body mass index is 38.26 kg/m². Morbid obesity complicates all aspects of disease management from diagnostic modalities to treatment. Weight loss encouraged and health benefits explained to  patient.         Moderate protein-calorie malnutrition  Nutrition consulted. Most recent weight and BMI monitored-     Measurements:  Wt Readings from Last 1 Encounters:   02/21/25 131.5 kg (290 lb)   Body mass index is 38.26 kg/m².    Patient has been screened and assessed by RD.    Malnutrition Type:  Context:    Level:      Malnutrition Characteristic Summary:       Interventions/Recommendations (treatment strategy):         Hypotension        Hepatic encephalopathy  Continue lactulose.      CKD (chronic kidney disease) stage 4, GFR 15-29 ml/min  Creatine stable for now. BMP reviewed- noted Estimated Creatinine Clearance: 53.5 mL/min (A) (based on SCr of 1.8 mg/dL (H)). according to latest data. Based on current GFR, CKD stage is stage 5 - GFR < 15.  Monitor UOP and serial BMP and adjust therapy as needed. Renally dose meds. Avoid nephrotoxic medications and procedures.    Per renal, currently on MWF HD, ? Still needs this?    Suspected Hepatorenal syndrome  Slightly hypervolemic today, much improved from past care.      Longstanding persistent atrial fibrillation  Patient has long standing persistent (>12 months) atrial fibrillation. Patient is currently in atrial fibrillation. [id number] Score: 1. The patients heart rate in the last 24 hours is as follows:  Pulse  Min: 88  Max: 113     Antiarrhythmics  metoprolol tartrate (LOPRESSOR) tablet 25 mg, 2 times daily, Oral    Anticoagulants       Plan  - Replete lytes with a goal of K>4, Mg >2  - Patient is anticoagulated, see medications listed above.  - Patient's afib is currently controlled    Auto anticoagulated with CLD.        Coagulopathy  CLD      Cirrhosis  Patient with known Cirrhosis with Child's class C. Co-morbidities are present and inclusive of portal hypertension, hepatic encephalopathy, malnutrition, anemia/pancytopenia, and anticoagulation.  MELD-Na score calculated; MELD 3.0: 28 at 2/23/2025  5:15 AM  MELD-Na: 26 at 2/23/2025  5:15 AM  Calculated  from:  Serum Creatinine: 1.8 mg/dL at 2/23/2025  5:15 AM  Serum Sodium: 133 mmol/L at 2/23/2025  5:15 AM  Total Bilirubin: 3.9 mg/dL at 2/23/2025  5:15 AM  Serum Albumin: 2.1 g/dL at 2/23/2025  5:15 AM  INR(ratio): 1.9 at 2/21/2025  6:42 PM  Age at listing (hypothetical): 69 years  Sex: Male at 2/23/2025  5:15 AM      Continue chronic meds. Etiology likely ETOH. Will avoid any hepatotoxic meds, and monitor CBC/CMP/INR for synthetic function.       VTE Risk Mitigation (From admission, onward)           Ordered     Place sequential compression device  Until discontinued         02/22/25 1002     IP VTE HIGH RISK PATIENT  Once         02/21/25 2204     Place ANDREW hose  Until discontinued         02/21/25 2204                    Discharge Planning   AUTUMN:      Code Status: Full Code   Medical Readiness for Discharge Date:   Discharge Plan A: Home                Please place Justification for DME        Joao Villegas III, MD  Department of Hospital Medicine   [facility]

## 2025-02-23 NOTE — PLAN OF CARE
Plan of care reviewed and ongoing with patient. 18 gauge IV to L AC saline locked, HD Catheter to R IJ, 2L NC tolerating well. Ambulated to BR independently, free of falls during the night. Droplet precautions maintained. Free of pain and complaints during the night. Call light and personal items within reach.           Problem: Adult Inpatient Plan of Care  Goal: Plan of Care Review  Outcome: Not Progressing  Goal: Patient-Specific Goal (Individualized)  Outcome: Not Progressing  Goal: Absence of Hospital-Acquired Illness or Injury  Outcome: Not Progressing  Goal: Optimal Comfort and Wellbeing  Outcome: Not Progressing  Goal: Readiness for Transition of Care  Outcome: Not Progressing     Problem: Acute Kidney Injury/Impairment  Goal: Fluid and Electrolyte Balance  Outcome: Not Progressing  Goal: Improved Oral Intake  Outcome: Not Progressing  Goal: Effective Renal Function  Outcome: Not Progressing     Problem: Infection  Goal: Absence of Infection Signs and Symptoms  Outcome: Not Progressing     Problem: Wound  Goal: Optimal Coping  Outcome: Not Progressing  Goal: Optimal Functional Ability  Outcome: Not Progressing  Goal: Absence of Infection Signs and Symptoms  Outcome: Not Progressing  Goal: Improved Oral Intake  Outcome: Not Progressing  Goal: Optimal Pain Control and Function  Outcome: Not Progressing  Goal: Skin Health and Integrity  Outcome: Not Progressing  Goal: Optimal Wound Healing  Outcome: Not Progressing

## 2025-02-23 NOTE — PT/OT/SLP EVAL
Occupational Therapy   Evaluation    Name: Juan Carlos Yoo Sr.  MRN: [id number]  Admitting Diagnosis: Rhinovirus infection  Recent Surgery: * No surgery found *      Recommendations:     Discharge Recommendations: Low Intensity Therapy  Discharge Equipment Recommendations:  none  Barriers to discharge:  None    Assessment:     Juan Carlos Yoo Sr. is a 69 y.o. male with a medical diagnosis of Rhinovirus infection.  He presents sitting EOB eating lunch, agreeable to evaluation; however, refused to stand or attempt ambulation due to increased pain in medial R thigh at site of possible abscess. Performance deficits affecting function: weakness, impaired endurance, impaired self care skills, impaired functional mobility, impaired cardiopulmonary response to activity, pain, impaired skin.      Rehab Prognosis: Good; patient would benefit from acute skilled OT services to address these deficits and reach maximum level of function.       Plan:     Patient to be seen 5 x/week to address the above listed problems via self-care/home management, community/work re-entry, therapeutic activities, therapeutic exercises  Plan of Care Expires: 03/07/25  Plan of Care Reviewed with: patient    Subjective     Chief Complaint: Increased pain in R thigh  Patient/Family Comments/goals: PLOF    Occupational Profile:  Living Environment: [facility] with one small threshold to enter  Previous level of function: Independent with ADLs, was about to cease use of RW for functional ambulation  Roles and Routines: lives alone, has son who lives nearby to assist as needed  Equipment Used at Home: wheelchair, walker, rolling, shower chair, bedside commode  Assistance upon Discharge: son who lives on patient's property    Pain/Comfort:  Pain Rating 1: other (see comments) (did not quanify)  Location - Side 1: Right  Location - Orientation 1: medial  Location 1: thigh  Pain Addressed 1: Distraction, Nurse notified  Pain Rating Post-Intervention 1:  (did not  quantify)    Patients cultural, spiritual, Taoism conflicts given the current situation:      Objective:     Communicated with: nursing prior to session.  Patient found sitting edge of bed with telemetry, oxygen upon OT entry to room.    General Precautions: Standard, droplet  Orthopedic Precautions: N/A  Braces: N/A  Respiratory Status: Nasal cannula, flow 1 L/min    Occupational Performance:    Bed Mobility:    Patient completed Supine to Sit with modified independence  Patient completed Sit to Supine with modified independence    Functional Mobility/Transfers:  Patient completed Sit <> Stand Transfer with stand by assistance and contact guard assistance  with  rolling walker   Patient completed Toilet Transfer Step Transfer technique with stand by assistance and contact guard assistance with  rolling walker  Functional Mobility: able to perform transfers with CONTACT GUARD ASSISTANCE      Activities of Daily Living:  Toileting: minimum assistance      Cognitive/Visual Perceptual:  Cognitive/Psychosocial Skills:     -       Oriented to: Person, Place, Time, and Situation   -       Follows Commands/attention:Follows multistep  commands  -       Communication: clear/fluent  Visual/Perceptual:      -Intact intact    Physical Exam:  Balance:    -       Static Sitting:  Good  Skin integrity: Medial R thigh- swollen/redness  Upper Extremity Range of Motion:     -       Right Upper Extremity: WFL  -       Left Upper Extremity: WFL  Upper Extremity Strength:    -       Right Upper Extremity: Deficits: 4/5  -       Left Upper Extremity: Deficits: 4/5   Strength:    -       Right Upper Extremity: Deficits: 4/5  -       Left Upper Extremity: Deficits: 4/5    AMPAC 6 Click ADL:  AMPAC Total Score: 21    Treatment & Education:  Educated patient on importance of mobility during hospitalization.  Pt v/u.    Patient left sitting edge of bed with all lines intact, call button in reach, and nursing notified    GOALS:    Multidisciplinary Problems       Occupational Therapy Goals          Problem: Occupational Therapy    Goal Priority Disciplines Outcome Interventions   Occupational Therapy Goal     OT, PT/OT Progressing    Description: Goals to be met by: 3/7/2025     Patient will increase functional independence with ADLs by performing:    Toileting from toilet with Set-up Assistance for hygiene and clothing management.   Sitting at edge of bed x15 minutes with Modified Stem.  Supine to sit with Modified Stem.  Toilet transfer to toilet with Modified Stem.  Increased functional strength to WFL for increased efficiency completing ADLs.                         DME Justifications:  No DME recommended requiring DME justifications    History:     Past Medical History:   Diagnosis Date    Atrial fibrillation     Bulging disc     Cirrhosis     Colon polyp 12/29/2017    Rpt 5 yrs    EV (esophageal varices)     Hypertension 03/30/2023    Renal disorder     Skin cancer 03/2017    Thrombocytopenia          Past Surgical History:   Procedure Laterality Date    CATHETERIZATION OF BOTH LEFT AND RIGHT HEART N/A 2/8/2023    Procedure: CATHETERIZATION, HEART, BOTH LEFT AND RIGHT;  Surgeon: Flo Malone MD;  Location: [facility] CATH LAB;  Service: Cardiology;  Laterality: N/A;  low bleeding risk 1.0%    CHOLECYSTECTOMY      COLONOSCOPY      COLONOSCOPY N/A 12/29/2017    ta rpt 2022    CORONARY ANGIOGRAPHY N/A 2/8/2023    Procedure: ANGIOGRAM, CORONARY ARTERY;  Surgeon: Flo Malone MD;  Location: [facility] CATH LAB;  Service: Cardiology;  Laterality: N/A;    HERNIA REPAIR      SKIN BIOPSY  03/2017    TONSILLECTOMY      UPPER GASTROINTESTINAL ENDOSCOPY  2011    EV    UPPER GASTROINTESTINAL ENDOSCOPY  2016    VASECTOMY         Time Tracking:     OT Date of Treatment: 02/23/25  OT Start Time: 1355  OT Stop Time: 1420  OT Total Time (min): 25 min    Billable Minutes:Evaluation 15  Therapeutic Activity 10  Shanice Jon  OT      2/23/2025

## 2025-02-24 PROBLEM — E83.42 HYPOMAGNESEMIA: Status: ACTIVE | Noted: 2025-02-24

## 2025-02-24 LAB
ALBUMIN SERPL BCP-MCNC: 1.9 G/DL (ref 3.5–5.2)
ALP SERPL-CCNC: 70 U/L (ref 55–135)
ALT SERPL W/O P-5'-P-CCNC: 12 U/L (ref 10–44)
ANION GAP SERPL CALC-SCNC: 7 MMOL/L (ref 8–16)
AST SERPL-CCNC: 39 U/L (ref 10–40)
BACTERIA UR CULT: NORMAL
BACTERIA UR CULT: NORMAL
BASOPHILS # BLD AUTO: 0.06 K/UL (ref 0–0.2)
BASOPHILS NFR BLD: 0.8 % (ref 0–1.9)
BILIRUB SERPL-MCNC: 3.1 MG/DL (ref 0.1–1)
BUN SERPL-MCNC: 16 MG/DL (ref 8–23)
CALCIUM SERPL-MCNC: 8 MG/DL (ref 8.7–10.5)
CHLORIDE SERPL-SCNC: 101 MMOL/L (ref 95–110)
CO2 SERPL-SCNC: 26 MMOL/L (ref 23–29)
CREAT SERPL-MCNC: 1.8 MG/DL (ref 0.5–1.4)
DIFFERENTIAL METHOD BLD: ABNORMAL
EOSINOPHIL # BLD AUTO: 0.1 K/UL (ref 0–0.5)
EOSINOPHIL NFR BLD: 1 % (ref 0–8)
ERYTHROCYTE [DISTWIDTH] IN BLOOD BY AUTOMATED COUNT: 18.9 % (ref 11.5–14.5)
EST. GFR  (NO RACE VARIABLE): 39.7 ML/MIN/1.73 M^2
GLUCOSE SERPL-MCNC: 104 MG/DL (ref 70–110)
HCT VFR BLD AUTO: 29.7 % (ref 40–54)
HGB BLD-MCNC: 9.8 G/DL (ref 14–18)
IMM GRANULOCYTES # BLD AUTO: 0.03 K/UL (ref 0–0.04)
IMM GRANULOCYTES NFR BLD AUTO: 0.4 % (ref 0–0.5)
LYMPHOCYTES # BLD AUTO: 0.7 K/UL (ref 1–4.8)
LYMPHOCYTES NFR BLD: 9.1 % (ref 18–48)
MAGNESIUM SERPL-MCNC: 1.5 MG/DL (ref 1.6–2.6)
MCH RBC QN AUTO: 29.8 PG (ref 27–31)
MCHC RBC AUTO-ENTMCNC: 33 G/DL (ref 32–36)
MCV RBC AUTO: 90 FL (ref 82–98)
MONOCYTES # BLD AUTO: 1.2 K/UL (ref 0.3–1)
MONOCYTES NFR BLD: 16 % (ref 4–15)
NEUTROPHILS # BLD AUTO: 5.6 K/UL (ref 1.8–7.7)
NEUTROPHILS NFR BLD: 72.7 % (ref 38–73)
NRBC BLD-RTO: 0 /100 WBC
PLATELET # BLD AUTO: 45 K/UL (ref 150–450)
PMV BLD AUTO: 12.4 FL (ref 9.2–12.9)
POTASSIUM SERPL-SCNC: 3.2 MMOL/L (ref 3.5–5.1)
PROT SERPL-MCNC: 6.1 G/DL (ref 6–8.4)
RBC # BLD AUTO: 3.29 M/UL (ref 4.6–6.2)
SODIUM SERPL-SCNC: 134 MMOL/L (ref 136–145)
WBC # BLD AUTO: 7.71 K/UL (ref 3.9–12.7)

## 2025-02-24 PROCEDURE — 83735 ASSAY OF MAGNESIUM: CPT | Performed by: INTERNAL MEDICINE

## 2025-02-24 PROCEDURE — 85025 COMPLETE CBC W/AUTO DIFF WBC: CPT | Performed by: INTERNAL MEDICINE

## 2025-02-24 PROCEDURE — 99900031 HC PATIENT EDUCATION (STAT)

## 2025-02-24 PROCEDURE — 94761 N-INVAS EAR/PLS OXIMETRY MLT: CPT

## 2025-02-24 PROCEDURE — 25000003 PHARM REV CODE 250: Performed by: EMERGENCY MEDICINE

## 2025-02-24 PROCEDURE — 63600175 PHARM REV CODE 636 W HCPCS: Performed by: INTERNAL MEDICINE

## 2025-02-24 PROCEDURE — 25000003 PHARM REV CODE 250: Performed by: INTERNAL MEDICINE

## 2025-02-24 PROCEDURE — 36415 COLL VENOUS BLD VENIPUNCTURE: CPT | Performed by: INTERNAL MEDICINE

## 2025-02-24 PROCEDURE — 11000001 HC ACUTE MED/SURG PRIVATE ROOM

## 2025-02-24 PROCEDURE — 80053 COMPREHEN METABOLIC PANEL: CPT | Performed by: INTERNAL MEDICINE

## 2025-02-24 PROCEDURE — 25000242 PHARM REV CODE 250 ALT 637 W/ HCPCS: Performed by: INTERNAL MEDICINE

## 2025-02-24 PROCEDURE — 27000221 HC OXYGEN, UP TO 24 HOURS

## 2025-02-24 PROCEDURE — 94640 AIRWAY INHALATION TREATMENT: CPT

## 2025-02-24 PROCEDURE — 99900035 HC TECH TIME PER 15 MIN (STAT)

## 2025-02-24 PROCEDURE — 27000207 HC ISOLATION

## 2025-02-24 RX ORDER — METOLAZONE 5 MG/1
5 TABLET ORAL DAILY
Status: DISCONTINUED | OUTPATIENT
Start: 2025-02-25 | End: 2025-02-27

## 2025-02-24 RX ORDER — FUROSEMIDE 40 MG/1
120 TABLET ORAL 2 TIMES DAILY
Status: DISCONTINUED | OUTPATIENT
Start: 2025-02-24 | End: 2025-02-28

## 2025-02-24 RX ADMIN — LACTULOSE 30 G: 20 SOLUTION ORAL at 12:02

## 2025-02-24 RX ADMIN — TRAMADOL HYDROCHLORIDE 50 MG: 50 TABLET, COATED ORAL at 02:02

## 2025-02-24 RX ADMIN — Medication 100 MG: at 08:02

## 2025-02-24 RX ADMIN — FAMOTIDINE 20 MG: 20 TABLET, FILM COATED ORAL at 08:02

## 2025-02-24 RX ADMIN — METOPROLOL TARTRATE 25 MG: 25 TABLET, FILM COATED ORAL at 09:02

## 2025-02-24 RX ADMIN — LEVALBUTEROL 1.25 MG: 1.25 SOLUTION, CONCENTRATE RESPIRATORY (INHALATION) at 07:02

## 2025-02-24 RX ADMIN — MIDODRINE HYDROCHLORIDE 5 MG: 5 TABLET ORAL at 06:02

## 2025-02-24 RX ADMIN — LEVALBUTEROL 1.25 MG: 1.25 SOLUTION, CONCENTRATE RESPIRATORY (INHALATION) at 03:02

## 2025-02-24 RX ADMIN — CEFTRIAXONE SODIUM 1 G: 1 INJECTION, POWDER, FOR SOLUTION INTRAMUSCULAR; INTRAVENOUS at 10:02

## 2025-02-24 RX ADMIN — MUPIROCIN: 20 OINTMENT TOPICAL at 09:02

## 2025-02-24 RX ADMIN — MIDODRINE HYDROCHLORIDE 5 MG: 5 TABLET ORAL at 09:02

## 2025-02-24 RX ADMIN — LACTULOSE 30 G: 20 SOLUTION ORAL at 03:02

## 2025-02-24 RX ADMIN — FUROSEMIDE 80 MG: 40 TABLET ORAL at 08:02

## 2025-02-24 RX ADMIN — MAGNESIUM 64 MG (MAGNESIUM CHLORIDE) TABLET,DELAYED RELEASE 128 MG: at 08:02

## 2025-02-24 RX ADMIN — MIDODRINE HYDROCHLORIDE 5 MG: 5 TABLET ORAL at 02:02

## 2025-02-24 RX ADMIN — FUROSEMIDE 120 MG: 40 TABLET ORAL at 06:02

## 2025-02-24 RX ADMIN — LACTULOSE 30 G: 20 SOLUTION ORAL at 09:02

## 2025-02-24 RX ADMIN — CEFTRIAXONE SODIUM 1 G: 1 INJECTION, POWDER, FOR SOLUTION INTRAMUSCULAR; INTRAVENOUS at 11:02

## 2025-02-24 RX ADMIN — TAMSULOSIN HYDROCHLORIDE 0.4 MG: 0.4 CAPSULE ORAL at 08:02

## 2025-02-24 RX ADMIN — METOPROLOL TARTRATE 25 MG: 25 TABLET, FILM COATED ORAL at 08:02

## 2025-02-24 RX ADMIN — POTASSIUM CHLORIDE 40 MEQ: 1500 TABLET, EXTENDED RELEASE ORAL at 08:02

## 2025-02-24 RX ADMIN — MUPIROCIN: 20 OINTMENT TOPICAL at 08:02

## 2025-02-24 RX ADMIN — POTASSIUM CHLORIDE 40 MEQ: 1500 TABLET, EXTENDED RELEASE ORAL at 09:02

## 2025-02-24 RX ADMIN — DICLOFENAC SODIUM 4 G: 10 GEL TOPICAL at 09:02

## 2025-02-24 RX ADMIN — LEVALBUTEROL 1.25 MG: 1.25 SOLUTION, CONCENTRATE RESPIRATORY (INHALATION) at 11:02

## 2025-02-24 NOTE — PLAN OF CARE
Plan of care reviewed and ongoing with patient. 18 gauge IV to L AC saline locked, HD Catheter to R IJ, 2L NC tolerating well. Ambulated to BR independently, free of falls during the night. Droplet precautions maintained. Call light and personal items within reach.       Problem: Adult Inpatient Plan of Care  Goal: Plan of Care Review  Outcome: Not Progressing  Goal: Patient-Specific Goal (Individualized)  Outcome: Not Progressing  Goal: Absence of Hospital-Acquired Illness or Injury  Outcome: Not Progressing  Goal: Optimal Comfort and Wellbeing  Outcome: Not Progressing  Goal: Readiness for Transition of Care  Outcome: Not Progressing     Problem: Acute Kidney Injury/Impairment  Goal: Fluid and Electrolyte Balance  Outcome: Not Progressing  Goal: Improved Oral Intake  Outcome: Not Progressing  Goal: Effective Renal Function  Outcome: Not Progressing     Problem: Infection  Goal: Absence of Infection Signs and Symptoms  Outcome: Not Progressing     Problem: Wound  Goal: Optimal Coping  Outcome: Not Progressing  Goal: Optimal Functional Ability  Outcome: Not Progressing  Goal: Absence of Infection Signs and Symptoms  Outcome: Not Progressing  Goal: Improved Oral Intake  Outcome: Not Progressing  Goal: Optimal Pain Control and Function  Outcome: Not Progressing  Goal: Skin Health and Integrity  Outcome: Not Progressing  Goal: Optimal Wound Healing  Outcome: Not Progressing

## 2025-02-24 NOTE — ASSESSMENT & PLAN NOTE
2/23 FM:  Starting abx, get US.    2/24:  Increased induration and erythema noted.  Ultrasound pending.  Patient afebrile without leukocytosis.  Continue IV antibiotics.

## 2025-02-24 NOTE — PROGRESS NOTES
[facility] Medicine  Progress Note    Patient Name: Juan Carlos Yoo Sr.  MRN: [id number]  Patient Class: IP- Inpatient   Admission Date: 2/21/2025  Length of Stay: 2 days  Attending Physician: Joao Villegas III, MD  Primary Care Provider: Joao Villegas III, MD        Subjective     Principal Problem:Rhinovirus infection        HPI:  ED HPI:  Juan Carlos Yoo Sr. is a 69 y.o. male with PMHX of alcoholic cirrhosis, HCC, thrombocytopenia, Afib (not on anticoagulation due to thrombocytopenia), ESRD on HD M/W/F who presents to the emergency department C/O SOB.     Patient was recently  admitted to [facility] due to severe anasarca JAE, evaluated for hepatorenal syndrome and had 40 day hospitalization.  Patient released from rehab about a week ago.  Has endorse increasing shortness of breath for the past 3 days.  States at night it is worse when trying to sleep.  Patient lays on his side and uses 3 pillows to prop his head up.  Patient went to urgent care today because he felt like tonight was going to be another bad night and they sent him to ER.  He denies fever.  States swelling is about baseline.  Had typical dialysis session today.  He reports they were trying to take off 3 L but is unsure of how much volume was removed.  He makes urine but states it is difficult to urinate due to anasarca.  He states compliance to low-sodium diet and fluid restriction since recent discharge.  Has not had alcohol in over 7 months.    IM HPI:  Patient's chart reviewed and above history noted.  Patient's been admitted for acute on chronic dyspnea and was diagnosed with rhinovirus.  Patient had been having increasing dyspnea weakness coughing with clear sputum production over the last 36 hours and ultimately presented with worsening shortness of breath.  He has been placed on oxygen nasal cannula states he is feeling much better.  Patient has a mild cough and wheezing on exam.  Patient has a complex  medical history that is has been reviewed he seems slightly hypervolemic we will notify his nephrologist and he will likely need his routine hemodialysis if he ends up staying through Monday.  Patient is afebrile labs noted potassium low we will replete and maybe a good candidate for Aldactone.    Overview/Hospital Course:  2/23 FM:  Patient complaining of a area of redness and pain on the right thigh above the knee.  This started yesterday.  Patient's exam consistent with a area of focal cellulitis and induration.  Ordering ultrasound and starting antibiotics.  Patient's respiratory complaints are much improved and getting back to his baseline.  Patient's nephrologist aware he is in house and may need hemodialysis in a.m..    2/24 DL:  Patient doing well this morning.  He is sitting up in bed in his awake, alert, oriented.  Patient reports respiratory status improved.  Patient now off of supplemental O2.  Patient reports continued tenderness to area of redness to right inner thigh.  Redness and induration have spread past previously marked borders.  Patient is scheduled for ultrasound today.  Continue IV antibiotics.  Patient is scheduled for hemodialysis today.  Nephrology following.    Past Medical History:   Diagnosis Date    Atrial fibrillation     Bulging disc     Cirrhosis     Colon polyp 12/29/2017    Rpt 5 yrs    EV (esophageal varices)     Hypertension 03/30/2023    Renal disorder     Skin cancer 03/2017    Thrombocytopenia        Past Surgical History:   Procedure Laterality Date    CATHETERIZATION OF BOTH LEFT AND RIGHT HEART N/A 2/8/2023    Procedure: CATHETERIZATION, HEART, BOTH LEFT AND RIGHT;  Surgeon: Flo Malone MD;  Location: [facility] CATH LAB;  Service: Cardiology;  Laterality: N/A;  low bleeding risk 1.0%    CHOLECYSTECTOMY      COLONOSCOPY      COLONOSCOPY N/A 12/29/2017    ta rpt 2022    CORONARY ANGIOGRAPHY N/A 2/8/2023    Procedure: ANGIOGRAM, CORONARY ARTERY;  Surgeon: Flo Malone  MD;  Location: [facility] CATH LAB;  Service: Cardiology;  Laterality: N/A;    HERNIA REPAIR      SKIN BIOPSY  2017    TONSILLECTOMY      UPPER GASTROINTESTINAL ENDOSCOPY      EV    UPPER GASTROINTESTINAL ENDOSCOPY      VASECTOMY         Review of patient's allergies indicates:  No Known Allergies    No current facility-administered medications on file prior to encounter.     Current Outpatient Medications on File Prior to Encounter   Medication Sig    famotidine (PEPCID) 20 MG tablet TAKE 1 TABLET BY MOUTH EVERY OTHER DAY    furosemide (LASIX) 80 MG tablet Take 80 mg by mouth every Tuesday, Thursday, Saturday, .    lactulose (CHRONULAC) 20 gram/30 mL Soln Take 45 mLs (30 g total) by mouth 3 (three) times daily. TITRATE TO 3-4 BOWEL MOVEMENTS DAILY.    metoprolol tartrate (LOPRESSOR) 25 MG tablet TAKE 0.5 TABLETS BY MOUTH 2 TIMES DAILY.    midodrine (PROAMATINE) 5 MG Tab Take 3 tablets (15 mg total) by mouth every 8 (eight) hours.    potassium chloride (K-TAB) 20 mEq SMARTSI Tablet(s) By Mouth    tamsulosin (FLOMAX) 0.4 mg Cap Take by mouth.    tuberculin,purif.prot.deriv. (TUBERSOL IDRM) Inject 0.1 mLs into the skin.     Family History       Problem Relation (Age of Onset)    Cancer Maternal Uncle    Heart disease Maternal Uncle    Hyperlipidemia Brother    Ovarian cancer Sister          Tobacco Use    Smoking status: Never     Passive exposure: Never    Smokeless tobacco: Never   Substance and Sexual Activity    Alcohol use: Not Currently     Alcohol/week: 0.0 standard drinks of alcohol    Drug use: No    Sexual activity: Not Currently     Review of Systems   Constitutional:  Positive for activity change, appetite change and fatigue. Negative for chills and fever.   HENT:  Negative for ear pain, mouth sores, nosebleeds and sore throat.    Eyes:  Negative for visual disturbance.   Respiratory:  Positive for cough, shortness of breath and wheezing. Negative for chest tightness and stridor.     Cardiovascular:  Positive for leg swelling. Negative for chest pain and palpitations.   Gastrointestinal:  Negative for abdominal distention, abdominal pain, blood in stool, constipation, diarrhea, nausea and vomiting.   Endocrine: Negative for polyphagia.   Genitourinary:  Positive for difficulty urinating. Negative for dysuria, flank pain and frequency.   Musculoskeletal:  Positive for gait problem. Negative for myalgias.   Skin:  Positive for color change and rash.   Neurological:  Positive for weakness. Negative for dizziness, tremors, seizures, syncope, facial asymmetry, speech difficulty and headaches.   Hematological:  Negative for adenopathy.   Psychiatric/Behavioral:  Positive for confusion. Negative for agitation and hallucinations. The patient is not nervous/anxious.      Objective:     Vital Signs (Most Recent):  Temp: 97.8 °F (36.6 °C) (02/24/25 0436)  Pulse: 105 (02/24/25 0843)  Resp: 18 (02/24/25 0843)  BP: (!) 97/54 (02/24/25 0843)  SpO2: 97 % (02/24/25 0843) Vital Signs (24h Range):  Temp:  [97.8 °F (36.6 °C)-98.6 °F (37 °C)] 97.8 °F (36.6 °C)  Pulse:  [] 105  Resp:  [18-20] 18  SpO2:  [95 %-100 %] 97 %  BP: ()/(53-64) 97/54     Weight: 131.5 kg (290 lb)  Body mass index is 38.26 kg/m².     Physical Exam  Vitals and nursing note reviewed.   Constitutional:       General: He is not in acute distress.     Appearance: He is obese. He is ill-appearing. He is not diaphoretic.   HENT:      Head: Normocephalic and atraumatic.      Right Ear: External ear normal.      Left Ear: External ear normal.      Nose: Nose normal. No rhinorrhea.      Mouth/Throat:      Mouth: Mucous membranes are moist.      Pharynx: No oropharyngeal exudate or posterior oropharyngeal erythema.   Eyes:      General: Scleral icterus present.      Extraocular Movements: Extraocular movements intact.   Neck:      Vascular: No carotid bruit.   Cardiovascular:      Rate and Rhythm: Normal rate. Rhythm irregular.      Heart  sounds: Normal heart sounds. No murmur heard.     No friction rub. No gallop.   Pulmonary:      Effort: No respiratory distress.      Breath sounds: No stridor. Wheezing and rhonchi present. No rales.   Chest:      Chest wall: No tenderness.   Abdominal:      General: There is no distension.      Palpations: There is no mass.      Tenderness: There is no abdominal tenderness. There is no right CVA tenderness, left CVA tenderness, guarding or rebound.      Hernia: No hernia is present.   Musculoskeletal:         General: No swelling, tenderness or deformity.      Cervical back: No rigidity.      Right lower leg: Edema present.      Left lower leg: Edema present.   Lymphadenopathy:      Cervical: No cervical adenopathy.   Skin:     General: Skin is warm.      Capillary Refill: Capillary refill takes less than 2 seconds.      Coloration: Skin is jaundiced.      Findings: Erythema and rash present.          Neurological:      Mental Status: He is alert.      Cranial Nerves: No cranial nerve deficit.      Sensory: No sensory deficit.      Motor: Weakness present.      Coordination: Coordination normal.   Psychiatric:         Mood and Affect: Mood normal.         Behavior: Behavior normal.         Thought Content: Thought content normal.         Judgment: Judgment normal.                Significant Labs: All pertinent labs within the past 24 hours have been reviewed.  Recent Lab Results         02/24/25  0522        Albumin 1.9       ALP 70       ALT 12       Anion Gap 7       AST 39       Baso # 0.06       Basophil % 0.8       BILIRUBIN TOTAL 3.1  Comment: For infants and newborns, interpretation of results should be based  on gestational age, weight and in agreement with clinical  observations.    Premature Infant recommended reference ranges:  Up to 24 hours.............<8.0 mg/dL  Up to 48 hours............<12.0 mg/dL  3-5 days..................<15.0 mg/dL  6-29 days.................<15.0 mg/dL         BUN 16        Calcium 8.0       Chloride 101       CO2 26       Creatinine 1.8       Differential Method Automated       eGFR 39.7       Eos # 0.1       Eos % 1.0       Glucose 104       Gran # (ANC) 5.6       Gran % 72.7       Hematocrit 29.7       Hemoglobin 9.8       Immature Grans (Abs) 0.03  Comment: Mild elevation in immature granulocytes is non specific and   can be seen in a variety of conditions including stress response,   acute inflammation, trauma and pregnancy. Correlation with other   laboratory and clinical findings is essential.         Immature Granulocytes 0.4       Lymph # 0.7       Lymph % 9.1       Magnesium  1.5       MCH 29.8       MCHC 33.0       MCV 90       Mono # 1.2       Mono % 16.0       MPV 12.4       nRBC 0       Platelet Count 45       Potassium 3.2       PROTEIN TOTAL 6.1       RBC 3.29       RDW 18.9       Sodium 134       WBC 7.71               Significant Imaging: I have reviewed all pertinent imaging results/findings within the past 24 hours.    Assessment and Plan     * Rhinovirus infection  Conservative care, + now with O2 needs.    2/24:  Continue conservative care.  Symptoms improving.  Patient with stable O2 sats on room air.      Cellulitis of right lower extremity  2/23 FM:  Starting abx, get US.    2/24:  Increased induration and erythema noted.  Ultrasound pending.  Patient afebrile without leukocytosis.  Continue IV antibiotics.        Morbid obesity  Body mass index is 38.26 kg/m². Morbid obesity complicates all aspects of disease management from diagnostic modalities to treatment. Weight loss encouraged and health benefits explained to patient.         Moderate protein-calorie malnutrition  Nutrition consulted. Most recent weight and BMI monitored-     Measurements:  Wt Readings from Last 1 Encounters:   02/21/25 131.5 kg (290 lb)   Body mass index is 38.26 kg/m².    Patient has been screened and assessed by RD.    Malnutrition Type:  Context:    Level:      Malnutrition  Characteristic Summary:       Interventions/Recommendations (treatment strategy):         Hypotension  Blood pressure stable.  Continue monitoring.      Hepatic encephalopathy  Continue lactulose.      CKD (chronic kidney disease) stage 4, GFR 15-29 ml/min  Creatine stable for now. BMP reviewed- noted Estimated Creatinine Clearance: 53.5 mL/min (A) (based on SCr of 1.8 mg/dL (H)). according to latest data. Based on current GFR, CKD stage is stage 5 - GFR < 15.  Monitor UOP and serial BMP and adjust therapy as needed. Renally dose meds. Avoid nephrotoxic medications and procedures.    Per renal, currently on MWF HD, ? Still needs this?    Suspected Hepatorenal syndrome  Slightly hypervolemic today, much improved from past care.      Longstanding persistent atrial fibrillation  Patient has long standing persistent (>12 months) atrial fibrillation. Patient is currently in atrial fibrillation. [id number] Score: 1. The patients heart rate in the last 24 hours is as follows:  Pulse  Min: 89  Max: 107     Antiarrhythmics  metoprolol tartrate (LOPRESSOR) tablet 25 mg, 2 times daily, Oral    Anticoagulants       Plan  - Replete lytes with a goal of K>4, Mg >2  - Patient is anticoagulated, see medications listed above.  - Patient's afib is currently controlled    Auto anticoagulated with CLD.        Coagulopathy  CLD      Cirrhosis  Patient with known Cirrhosis with Child's class C. Co-morbidities are present and inclusive of portal hypertension, hepatic encephalopathy, malnutrition, anemia/pancytopenia, and anticoagulation.  MELD-Na score calculated; MELD 3.0: 28 at 2/23/2025  5:15 AM  MELD-Na: 26 at 2/23/2025  5:15 AM  Calculated from:  Serum Creatinine: 1.8 mg/dL at 2/23/2025  5:15 AM  Serum Sodium: 133 mmol/L at 2/23/2025  5:15 AM  Total Bilirubin: 3.9 mg/dL at 2/23/2025  5:15 AM  Serum Albumin: 2.1 g/dL at 2/23/2025  5:15 AM  INR(ratio): 1.9 at 2/21/2025  6:42 PM  Age at listing (hypothetical): 69 years  Sex: Male at  2/23/2025  5:15 AM      Continue chronic meds. Etiology likely ETOH. Will avoid any hepatotoxic meds, and monitor CBC/CMP/INR for synthetic function.       VTE Risk Mitigation (From admission, onward)           Ordered     Place sequential compression device  Until discontinued         02/22/25 1002     IP VTE HIGH RISK PATIENT  Once         02/21/25 2204     Place ANDREW hose  Until discontinued         02/21/25 2204                    Discharge Planning   AUTUMN:      Code Status: Full Code   Medical Readiness for Discharge Date:   Discharge Plan A: Home                Please place Justification for DME        Pilar Gates NP  Department of Hospital Medicine   [facility]

## 2025-02-24 NOTE — PROGRESS NOTES
Occupational Therapy  Treatment Attempt    Patient Name:  Juan Carlos BROOKS Fredo Julian.   MRN:  [id number]    Patient not seen today secondary to (P) Testing/imaging (xray/CT/MRI)[ultrasound]. Will follow-up 2/25/2025.    2/24/2025

## 2025-02-24 NOTE — PT/OT/SLP PROGRESS
Physical Therapy      Patient Name:  Juan Carlos Yoo .   MRN:  [id number]    Patient not seen today secondary to Other (Comment) (Patient had just recieved lunch.). Will follow-up later today or tomorrow, 2/25/2025.

## 2025-02-24 NOTE — SUBJECTIVE & OBJECTIVE
Past Medical History:   Diagnosis Date    Atrial fibrillation     Bulging disc     Cirrhosis     Colon polyp 2017    Rpt 5 yrs    EV (esophageal varices)     Hypertension 2023    Renal disorder     Skin cancer 2017    Thrombocytopenia        Past Surgical History:   Procedure Laterality Date    CATHETERIZATION OF BOTH LEFT AND RIGHT HEART N/A 2023    Procedure: CATHETERIZATION, HEART, BOTH LEFT AND RIGHT;  Surgeon: Flo Malone MD;  Location: [facility] CATH LAB;  Service: Cardiology;  Laterality: N/A;  low bleeding risk 1.0%    CHOLECYSTECTOMY      COLONOSCOPY      COLONOSCOPY N/A 2017    ta rpt     CORONARY ANGIOGRAPHY N/A 2023    Procedure: ANGIOGRAM, CORONARY ARTERY;  Surgeon: Flo Malone MD;  Location: [facility] CATH LAB;  Service: Cardiology;  Laterality: N/A;    HERNIA REPAIR      SKIN BIOPSY  2017    TONSILLECTOMY      UPPER GASTROINTESTINAL ENDOSCOPY      EV    UPPER GASTROINTESTINAL ENDOSCOPY  2016    VASECTOMY         Review of patient's allergies indicates:  No Known Allergies    No current facility-administered medications on file prior to encounter.     Current Outpatient Medications on File Prior to Encounter   Medication Sig    famotidine (PEPCID) 20 MG tablet TAKE 1 TABLET BY MOUTH EVERY OTHER DAY    furosemide (LASIX) 80 MG tablet Take 80 mg by mouth every Tuesday, Thursday, Saturday, .    lactulose (CHRONULAC) 20 gram/30 mL Soln Take 45 mLs (30 g total) by mouth 3 (three) times daily. TITRATE TO 3-4 BOWEL MOVEMENTS DAILY.    metoprolol tartrate (LOPRESSOR) 25 MG tablet TAKE 0.5 TABLETS BY MOUTH 2 TIMES DAILY.    midodrine (PROAMATINE) 5 MG Tab Take 3 tablets (15 mg total) by mouth every 8 (eight) hours.    potassium chloride (K-TAB) 20 mEq SMARTSI Tablet(s) By Mouth    tamsulosin (FLOMAX) 0.4 mg Cap Take by mouth.    tuberculin,purif.prot.deriv. (TUBERSOL IDRM) Inject 0.1 mLs into the skin.     Family History       Problem Relation (Age of Onset)     Cancer Maternal Uncle    Heart disease Maternal Uncle    Hyperlipidemia Brother    Ovarian cancer Sister          Tobacco Use    Smoking status: Never     Passive exposure: Never    Smokeless tobacco: Never   Substance and Sexual Activity    Alcohol use: Not Currently     Alcohol/week: 0.0 standard drinks of alcohol    Drug use: No    Sexual activity: Not Currently     Review of Systems   Constitutional:  Positive for activity change, appetite change and fatigue. Negative for chills and fever.   HENT:  Negative for ear pain, mouth sores, nosebleeds and sore throat.    Eyes:  Negative for visual disturbance.   Respiratory:  Positive for cough, shortness of breath and wheezing. Negative for chest tightness and stridor.    Cardiovascular:  Positive for leg swelling. Negative for chest pain and palpitations.   Gastrointestinal:  Negative for abdominal distention, abdominal pain, blood in stool, constipation, diarrhea, nausea and vomiting.   Endocrine: Negative for polyphagia.   Genitourinary:  Positive for difficulty urinating. Negative for dysuria, flank pain and frequency.   Musculoskeletal:  Positive for gait problem. Negative for myalgias.   Skin:  Positive for color change and rash.   Neurological:  Positive for weakness. Negative for dizziness, tremors, seizures, syncope, facial asymmetry, speech difficulty and headaches.   Hematological:  Negative for adenopathy.   Psychiatric/Behavioral:  Positive for confusion. Negative for agitation and hallucinations. The patient is not nervous/anxious.      Objective:     Vital Signs (Most Recent):  Temp: 97.8 °F (36.6 °C) (02/24/25 0436)  Pulse: 105 (02/24/25 0843)  Resp: 18 (02/24/25 0843)  BP: (!) 97/54 (02/24/25 0843)  SpO2: 97 % (02/24/25 0843) Vital Signs (24h Range):  Temp:  [97.8 °F (36.6 °C)-98.6 °F (37 °C)] 97.8 °F (36.6 °C)  Pulse:  [] 105  Resp:  [18-20] 18  SpO2:  [95 %-100 %] 97 %  BP: ()/(53-64) 97/54     Weight: 131.5 kg (290 lb)  Body mass index  is 38.26 kg/m².     Physical Exam  Vitals and nursing note reviewed.   Constitutional:       General: He is not in acute distress.     Appearance: He is obese. He is ill-appearing. He is not diaphoretic.   HENT:      Head: Normocephalic and atraumatic.      Right Ear: External ear normal.      Left Ear: External ear normal.      Nose: Nose normal. No rhinorrhea.      Mouth/Throat:      Mouth: Mucous membranes are moist.      Pharynx: No oropharyngeal exudate or posterior oropharyngeal erythema.   Eyes:      General: Scleral icterus present.      Extraocular Movements: Extraocular movements intact.   Neck:      Vascular: No carotid bruit.   Cardiovascular:      Rate and Rhythm: Normal rate. Rhythm irregular.      Heart sounds: Normal heart sounds. No murmur heard.     No friction rub. No gallop.   Pulmonary:      Effort: No respiratory distress.      Breath sounds: No stridor. Wheezing and rhonchi present. No rales.   Chest:      Chest wall: No tenderness.   Abdominal:      General: There is no distension.      Palpations: There is no mass.      Tenderness: There is no abdominal tenderness. There is no right CVA tenderness, left CVA tenderness, guarding or rebound.      Hernia: No hernia is present.   Musculoskeletal:         General: No swelling, tenderness or deformity.      Cervical back: No rigidity.      Right lower leg: Edema present.      Left lower leg: Edema present.   Lymphadenopathy:      Cervical: No cervical adenopathy.   Skin:     General: Skin is warm.      Capillary Refill: Capillary refill takes less than 2 seconds.      Coloration: Skin is jaundiced.      Findings: Erythema and rash present.          Neurological:      Mental Status: He is alert.      Cranial Nerves: No cranial nerve deficit.      Sensory: No sensory deficit.      Motor: Weakness present.      Coordination: Coordination normal.   Psychiatric:         Mood and Affect: Mood normal.         Behavior: Behavior normal.         Thought  Content: Thought content normal.         Judgment: Judgment normal.                Significant Labs: All pertinent labs within the past 24 hours have been reviewed.  Recent Lab Results         02/24/25  0522        Albumin 1.9       ALP 70       ALT 12       Anion Gap 7       AST 39       Baso # 0.06       Basophil % 0.8       BILIRUBIN TOTAL 3.1  Comment: For infants and newborns, interpretation of results should be based  on gestational age, weight and in agreement with clinical  observations.    Premature Infant recommended reference ranges:  Up to 24 hours.............<8.0 mg/dL  Up to 48 hours............<12.0 mg/dL  3-5 days..................<15.0 mg/dL  6-29 days.................<15.0 mg/dL         BUN 16       Calcium 8.0       Chloride 101       CO2 26       Creatinine 1.8       Differential Method Automated       eGFR 39.7       Eos # 0.1       Eos % 1.0       Glucose 104       Gran # (ANC) 5.6       Gran % 72.7       Hematocrit 29.7       Hemoglobin 9.8       Immature Grans (Abs) 0.03  Comment: Mild elevation in immature granulocytes is non specific and   can be seen in a variety of conditions including stress response,   acute inflammation, trauma and pregnancy. Correlation with other   laboratory and clinical findings is essential.         Immature Granulocytes 0.4       Lymph # 0.7       Lymph % 9.1       Magnesium  1.5       MCH 29.8       MCHC 33.0       MCV 90       Mono # 1.2       Mono % 16.0       MPV 12.4       nRBC 0       Platelet Count 45       Potassium 3.2       PROTEIN TOTAL 6.1       RBC 3.29       RDW 18.9       Sodium 134       WBC 7.71               Significant Imaging: I have reviewed all pertinent imaging results/findings within the past 24 hours.

## 2025-02-24 NOTE — ASSESSMENT & PLAN NOTE
Patient has long standing persistent (>12 months) atrial fibrillation. Patient is currently in atrial fibrillation. [id number] Score: 1. The patients heart rate in the last 24 hours is as follows:  Pulse  Min: 89  Max: 107     Antiarrhythmics  metoprolol tartrate (LOPRESSOR) tablet 25 mg, 2 times daily, Oral    Anticoagulants       Plan  - Replete lytes with a goal of K>4, Mg >2  - Patient is anticoagulated, see medications listed above.  - Patient's afib is currently controlled    Auto anticoagulated with CLD.

## 2025-02-24 NOTE — ASSESSMENT & PLAN NOTE
Conservative care, + now with O2 needs.    2/24:  Continue conservative care.  Symptoms improving.  Patient with stable O2 sats on room air.

## 2025-02-25 LAB
ALBUMIN SERPL BCP-MCNC: 2 G/DL (ref 3.5–5.2)
ALP SERPL-CCNC: 74 U/L (ref 55–135)
ALT SERPL W/O P-5'-P-CCNC: 13 U/L (ref 10–44)
ANION GAP SERPL CALC-SCNC: 8 MMOL/L (ref 8–16)
AST SERPL-CCNC: 36 U/L (ref 10–40)
BASOPHILS # BLD AUTO: 0.04 K/UL (ref 0–0.2)
BASOPHILS NFR BLD: 0.6 % (ref 0–1.9)
BILIRUB SERPL-MCNC: 3.9 MG/DL (ref 0.1–1)
BUN SERPL-MCNC: 18 MG/DL (ref 8–23)
CALCIUM SERPL-MCNC: 8.1 MG/DL (ref 8.7–10.5)
CHLORIDE SERPL-SCNC: 99 MMOL/L (ref 95–110)
CO2 SERPL-SCNC: 27 MMOL/L (ref 23–29)
CREAT SERPL-MCNC: 2 MG/DL (ref 0.5–1.4)
DIFFERENTIAL METHOD BLD: ABNORMAL
EOSINOPHIL # BLD AUTO: 0 K/UL (ref 0–0.5)
EOSINOPHIL NFR BLD: 0.6 % (ref 0–8)
ERYTHROCYTE [DISTWIDTH] IN BLOOD BY AUTOMATED COUNT: 19 % (ref 11.5–14.5)
EST. GFR  (NO RACE VARIABLE): 35 ML/MIN/1.73 M^2
GLUCOSE SERPL-MCNC: 98 MG/DL (ref 70–110)
HCT VFR BLD AUTO: 31.6 % (ref 40–54)
HGB BLD-MCNC: 10.3 G/DL (ref 14–18)
IMM GRANULOCYTES # BLD AUTO: 0.03 K/UL (ref 0–0.04)
IMM GRANULOCYTES NFR BLD AUTO: 0.4 % (ref 0–0.5)
LYMPHOCYTES # BLD AUTO: 0.5 K/UL (ref 1–4.8)
LYMPHOCYTES NFR BLD: 7.1 % (ref 18–48)
MAGNESIUM SERPL-MCNC: 1.3 MG/DL (ref 1.6–2.6)
MCH RBC QN AUTO: 30.1 PG (ref 27–31)
MCHC RBC AUTO-ENTMCNC: 32.6 G/DL (ref 32–36)
MCV RBC AUTO: 92 FL (ref 82–98)
MONOCYTES # BLD AUTO: 1.4 K/UL (ref 0.3–1)
MONOCYTES NFR BLD: 19.7 % (ref 4–15)
NEUTROPHILS # BLD AUTO: 5 K/UL (ref 1.8–7.7)
NEUTROPHILS NFR BLD: 71.6 % (ref 38–73)
NRBC BLD-RTO: 0 /100 WBC
PHOSPHATE SERPL-MCNC: 3.1 MG/DL (ref 2.7–4.5)
PLATELET # BLD AUTO: 52 K/UL (ref 150–450)
PMV BLD AUTO: 11.6 FL (ref 9.2–12.9)
POTASSIUM SERPL-SCNC: 3.5 MMOL/L (ref 3.5–5.1)
PROT SERPL-MCNC: 6.4 G/DL (ref 6–8.4)
RBC # BLD AUTO: 3.42 M/UL (ref 4.6–6.2)
SODIUM SERPL-SCNC: 134 MMOL/L (ref 136–145)
WBC # BLD AUTO: 6.91 K/UL (ref 3.9–12.7)

## 2025-02-25 PROCEDURE — 85025 COMPLETE CBC W/AUTO DIFF WBC: CPT | Performed by: INTERNAL MEDICINE

## 2025-02-25 PROCEDURE — 94640 AIRWAY INHALATION TREATMENT: CPT

## 2025-02-25 PROCEDURE — 25000003 PHARM REV CODE 250: Performed by: INTERNAL MEDICINE

## 2025-02-25 PROCEDURE — 11000001 HC ACUTE MED/SURG PRIVATE ROOM

## 2025-02-25 PROCEDURE — 97162 PT EVAL MOD COMPLEX 30 MIN: CPT

## 2025-02-25 PROCEDURE — 99900035 HC TECH TIME PER 15 MIN (STAT)

## 2025-02-25 PROCEDURE — 36415 COLL VENOUS BLD VENIPUNCTURE: CPT | Performed by: INTERNAL MEDICINE

## 2025-02-25 PROCEDURE — 83735 ASSAY OF MAGNESIUM: CPT | Performed by: INTERNAL MEDICINE

## 2025-02-25 PROCEDURE — 94761 N-INVAS EAR/PLS OXIMETRY MLT: CPT

## 2025-02-25 PROCEDURE — 63600175 PHARM REV CODE 636 W HCPCS: Performed by: INTERNAL MEDICINE

## 2025-02-25 PROCEDURE — 25000003 PHARM REV CODE 250: Performed by: EMERGENCY MEDICINE

## 2025-02-25 PROCEDURE — 99900031 HC PATIENT EDUCATION (STAT)

## 2025-02-25 PROCEDURE — 97530 THERAPEUTIC ACTIVITIES: CPT | Mod: CO

## 2025-02-25 PROCEDURE — 80053 COMPREHEN METABOLIC PANEL: CPT | Performed by: INTERNAL MEDICINE

## 2025-02-25 PROCEDURE — 27000207 HC ISOLATION

## 2025-02-25 PROCEDURE — 84100 ASSAY OF PHOSPHORUS: CPT | Performed by: INTERNAL MEDICINE

## 2025-02-25 PROCEDURE — 25000242 PHARM REV CODE 250 ALT 637 W/ HCPCS: Performed by: INTERNAL MEDICINE

## 2025-02-25 RX ORDER — MAGNESIUM SULFATE 1 G/100ML
1 INJECTION INTRAVENOUS ONCE
Status: COMPLETED | OUTPATIENT
Start: 2025-02-25 | End: 2025-02-25

## 2025-02-25 RX ORDER — MAGNESIUM SULFATE HEPTAHYDRATE 40 MG/ML
2 INJECTION, SOLUTION INTRAVENOUS ONCE
Status: COMPLETED | OUTPATIENT
Start: 2025-02-25 | End: 2025-02-25

## 2025-02-25 RX ADMIN — LEVALBUTEROL 1.25 MG: 1.25 SOLUTION, CONCENTRATE RESPIRATORY (INHALATION) at 07:02

## 2025-02-25 RX ADMIN — LACTULOSE 30 G: 20 SOLUTION ORAL at 03:02

## 2025-02-25 RX ADMIN — CEFTRIAXONE SODIUM 1 G: 1 INJECTION, POWDER, FOR SOLUTION INTRAMUSCULAR; INTRAVENOUS at 10:02

## 2025-02-25 RX ADMIN — METOPROLOL TARTRATE 25 MG: 25 TABLET, FILM COATED ORAL at 09:02

## 2025-02-25 RX ADMIN — MIDODRINE HYDROCHLORIDE 5 MG: 5 TABLET ORAL at 06:02

## 2025-02-25 RX ADMIN — MAGNESIUM SULFATE IN DEXTROSE 1 G: 10 INJECTION, SOLUTION INTRAVENOUS at 09:02

## 2025-02-25 RX ADMIN — LACTULOSE 30 G: 20 SOLUTION ORAL at 09:02

## 2025-02-25 RX ADMIN — FUROSEMIDE 120 MG: 40 TABLET ORAL at 09:02

## 2025-02-25 RX ADMIN — DICLOFENAC SODIUM 4 G: 10 GEL TOPICAL at 03:02

## 2025-02-25 RX ADMIN — MIDODRINE HYDROCHLORIDE 5 MG: 5 TABLET ORAL at 03:02

## 2025-02-25 RX ADMIN — METOLAZONE 5 MG: 5 TABLET ORAL at 09:02

## 2025-02-25 RX ADMIN — TAMSULOSIN HYDROCHLORIDE 0.4 MG: 0.4 CAPSULE ORAL at 09:02

## 2025-02-25 RX ADMIN — LEVALBUTEROL 1.25 MG: 1.25 SOLUTION, CONCENTRATE RESPIRATORY (INHALATION) at 03:02

## 2025-02-25 RX ADMIN — MUPIROCIN: 20 OINTMENT TOPICAL at 09:02

## 2025-02-25 RX ADMIN — CEFTRIAXONE SODIUM 1 G: 1 INJECTION, POWDER, FOR SOLUTION INTRAMUSCULAR; INTRAVENOUS at 11:02

## 2025-02-25 RX ADMIN — TRAMADOL HYDROCHLORIDE 50 MG: 50 TABLET, COATED ORAL at 10:02

## 2025-02-25 RX ADMIN — MAGNESIUM SULFATE IN WATER 2 G: 40 INJECTION, SOLUTION INTRAVENOUS at 10:02

## 2025-02-25 RX ADMIN — MAGNESIUM 64 MG (MAGNESIUM CHLORIDE) TABLET,DELAYED RELEASE 128 MG: at 10:02

## 2025-02-25 RX ADMIN — FAMOTIDINE 20 MG: 20 TABLET, FILM COATED ORAL at 09:02

## 2025-02-25 RX ADMIN — MIDODRINE HYDROCHLORIDE 5 MG: 5 TABLET ORAL at 09:02

## 2025-02-25 RX ADMIN — LEVALBUTEROL 1.25 MG: 1.25 SOLUTION, CONCENTRATE RESPIRATORY (INHALATION) at 11:02

## 2025-02-25 RX ADMIN — DICLOFENAC SODIUM 4 G: 10 GEL TOPICAL at 09:02

## 2025-02-25 RX ADMIN — Medication 100 MG: at 09:02

## 2025-02-25 NOTE — ASSESSMENT & PLAN NOTE
Hypomagnesemia- replace with Mg sulfate to correct if persists.  Monitor level daily.   Action 2: Continue

## 2025-02-25 NOTE — ASSESSMENT & PLAN NOTE
Patient has long standing persistent (>12 months) atrial fibrillation. Patient is currently in atrial fibrillation. [id number] Score: 1. The patients heart rate in the last 24 hours is as follows:  Pulse  Min: 87  Max: 100     Antiarrhythmics  metoprolol tartrate (LOPRESSOR) tablet 25 mg, 2 times daily, Oral    Anticoagulants       Plan  - Replete lytes with a goal of K>4, Mg >2  - Patient is anticoagulated, see medications listed above.  - Patient's afib is currently controlled    Auto anticoagulated with CLD.

## 2025-02-25 NOTE — ASSESSMENT & PLAN NOTE
Creatine stable for now. BMP reviewed- noted Estimated Creatinine Clearance: 48.2 mL/min (A) (based on SCr of 2 mg/dL (H)). according to latest data. Based on current GFR, CKD stage is stage 5 - GFR < 15.  Monitor UOP and serial BMP and adjust therapy as needed. Renally dose meds. Avoid nephrotoxic medications and procedures.    Per renal, currently on MWF HD, ? Still needs this?

## 2025-02-25 NOTE — PT/OT/SLP PROGRESS
"Occupational Therapy   Treatment    Name: Juan Carlos Yoo Sr.  MRN: [id number]  Admitting Diagnosis:  Rhinovirus infection       Recommendations:     Discharge Recommendations: Low Intensity Therapy  Discharge Equipment Recommendations:  none  Barriers to discharge:  None    Assessment:     Juan Carlos Yoo Sr. is a 69 y.o. male with a medical diagnosis of Rhinovirus infection.  He presents with poor participation. Performance deficits affecting function are weakness, impaired endurance, impaired self care skills, impaired functional mobility, gait instability, impaired balance, impaired cognition, decreased upper extremity function, decreased lower extremity function, decreased safety awareness, pain, impaired skin, impaired cardiopulmonary response to activity, edema, impaired joint extensibility, impaired muscle length. Pt unwilling to participate in therapeutic / activity. Pt was frustrated with IV lines and reports "I have no redness, but they say I do and want to change it." Pt with noticeable redness around IV site. Pt also reported he gets up multiple times t/o day and goes to bathroom. Pt educated and re-education to not get up without someone being present to decrease risk of falls. Pt understood but has poor carry over. Pt educated to use call bell any times he is getting out of bed. Pt again with poor carry over and was education again of fall prevention. Nurse was informed.     Rehab Prognosis:  Poor; patient would benefit from acute skilled OT services to address these deficits and reach maximum level of function.       Plan:     Patient to be seen 5 x/week to address the above listed problems via self-care/home management, community/work re-entry, therapeutic activities, therapeutic exercises  Plan of Care Expires: 03/07/25  Plan of Care Reviewed with: patient    Subjective     Chief Complaint: "They keep wanting to take this damn IV out because its red, but its not red to me."  Patient/Family Comments/goals: " Go home  Pain/Comfort:  Pain Rating 1: other (see comments) (did not quantify)  Location - Side 1: Right  Location - Orientation 1: medial  Location 1: thigh  Pain Addressed 1: Pre-medicate for activity, Nurse notified  Pain Rating Post-Intervention 1: other (see comments) (did not quantify)    Objective:     Communicated with: occupational therapist, physical therapist, and nurse prior to session.  Patient found supine with telemetry, peripheral IV upon OT entry to room.    General Precautions: Standard, droplet    Orthopedic Precautions:N/A  Braces: N/A  Respiratory Status: Room air     AMPAC 6 Click ADL: 21    Treatment & Education:  Pt educated multiple times on fall prevention and continued to dismiss it. Pt educated to use call bell every time he was getting up. Pt continued to deny he needed help or has any deficits. Pt re-educated once more before leaving pts room, and he continues to be frustrated with replacing IV. Pt was DC from physical therapy due to non compliance and not wanting to participate. Will talk to occupational therapist about possible DC patient due to noncompliance. Occupational therapist /ARVIZU collaboration to discuss POC, improvements, and d/c planning on todays date.      Patient left supine with all lines intact, call button in reach, and nurse notified    GOALS:   Multidisciplinary Problems       Occupational Therapy Goals          Problem: Occupational Therapy    Goal Priority Disciplines Outcome Interventions   Occupational Therapy Goal     OT, PT/OT Progressing    Description: Goals to be met by: 3/7/2025     Patient will increase functional independence with ADLs by performing:    Toileting from toilet with Set-up Assistance for hygiene and clothing management.   Sitting at edge of bed x15 minutes with Modified [place].  Supine to sit with Modified [place].  Toilet transfer to toilet with Modified [place].  Increased functional strength to WFL for increased  efficiency completing ADLs.                       Time Tracking:     OT Date of Treatment: 02/25/25  OT Start Time: 1400  OT Stop Time: 1409  OT Total Time (min): 9 min    Billable Minutes:Therapeutic Activity 9 min    OT/EDWARDO: EDWARDO     Number of EDWARDO visits since last OT visit: 1    2/25/2025  Suzanne ARVIZU

## 2025-02-25 NOTE — PT/OT/SLP EVAL
"Physical Therapy Evaluation and Discharge Note    Patient Name:  Juan Carlos Yoo Sr.   MRN:  [id number]    Recommendations:     Discharge Recommendations: Low Intensity Therapy   Discharge Equipment Recommendations: none   Barriers to discharge: None    Assessment:     Juan Carlos Yoo Sr. is a 69 y.o. male admitted with a medical diagnosis of Rhinovirus infection.  He presents with the following impairments/functional limitations: weakness, impaired endurance, impaired self care skills, impaired functional mobility, pain, impaired cardiopulmonary response to activity, edema, impaired skin. Although these limitations are causing decreased functional mobility and independence, patient reports that it is his PLOF and that he does not need PT while in the hospital. He reports that his son lives nearby to assist him if needed. Patient encouraged to report to nursing or MD if functional status declines and he wants another PT consult.     Plan:     Patient already at [facility] and refused further acute physical therapy services at this time. If functional status changes, please re-consult PT.     Subjective     Chief Complaint: knee/ inner thigh pain  Patient/Family Comments/goals: "I don't need PT. I walk on my own and have been going to the bathroom by my self."  Pain/Comfort:  Pain Rating 1: other (see comments) (not quantified)  Location - Side 1: Right  Location - Orientation 1: medial  Location 1: thigh  Pain Addressed 1: Distraction, Cessation of Activity, Nurse notified  Pain Rating Post-Intervention 1: other (see comments) (not quantified)    Patients cultural, spiritual, Adventist conflicts given the current situation: no    Living Environment:  Patient lives alone in [facility] with one threshold to enter. His son lives nearby.  Prior to admission, patients level of function was independent.  Equipment used at home: none. DME owned (not currently used): wheelchair, walker, rolling, shower chair, bedside commode.  Upon discharge, " patient will have assistance from family and self.    Objective:     Communicated with nurse and patient prior to session.  Patient found supine with telemetry, peripheral IV  upon PT entry to room.    General Precautions: Standard, droplet  Orthopedic Precautions:N/A   Braces: N/A  Respiratory Status: Room air    Vitals:   After treatment (supine)   BP     bpm   SpO2 (room air) 95%       Exams:  RLE ROM: WFL  RLE Strength: WFL  LLE ROM: WFL  LLE Strength: WFL    Functional Mobility:  Bed Mobility:     Rolling Left:  modified independence  Rolling Right: modified independence  Scooting: modified independence  Bridging: modified independence  Supine to Sit: modified independence  Sit to Supine: modified independence  Transfers:     Sit to Stand:  independence with no AD  Gait: ~25 ft no AD independently; however, patient reaches for walls during ambulation for balance  Balance:  modified independent standing with furniture / walls utilized for BUE support PRN, independent sitting unsupported      AM-PAC 6 CLICK MOBILITY  Total Score:23       Treatment & Education:  PT evaluation low complexity   Bed mobility   Rolling x2   Supine<>Sit   Bridging x5   Scooting x3  Transfers   STSx3  Gait    Gait training with no AD monitoring patient response to activity  Balance   Sitting EOB shifting weight   Standing with no AD  Education   Patient educated on balance deficits and safety during ambulation, why holding onto walls and furniture is not ideal for balance, role of acute care PT, POC, importance of OOB mobility, transfer safety, and call bell usage.     Patient left supine with all lines intact, call button in reach, nurse notified, and respiratory therapy present.      History:     Past Medical History:   Diagnosis Date    Atrial fibrillation     Bulging disc     Cirrhosis     Colon polyp 12/29/2017    Rpt 5 yrs    EV (esophageal varices)     Hypertension 03/30/2023    Renal disorder     Skin cancer 03/2017     Thrombocytopenia        Past Surgical History:   Procedure Laterality Date    CATHETERIZATION OF BOTH LEFT AND RIGHT HEART N/A 2/8/2023    Procedure: CATHETERIZATION, HEART, BOTH LEFT AND RIGHT;  Surgeon: Flo Malone MD;  Location: [facility] CATH LAB;  Service: Cardiology;  Laterality: N/A;  low bleeding risk 1.0%    CHOLECYSTECTOMY      COLONOSCOPY      COLONOSCOPY N/A 12/29/2017    ta rpt 2022    CORONARY ANGIOGRAPHY N/A 2/8/2023    Procedure: ANGIOGRAM, CORONARY ARTERY;  Surgeon: Flo Malone MD;  Location: [facility] CATH LAB;  Service: Cardiology;  Laterality: N/A;    HERNIA REPAIR      SKIN BIOPSY  03/2017    TONSILLECTOMY      UPPER GASTROINTESTINAL ENDOSCOPY  2011    EV    UPPER GASTROINTESTINAL ENDOSCOPY  2016    VASECTOMY         Time Tracking:     PT Received On: 02/25/25  PT Start Time: 1125     PT Stop Time: 1140  PT Total Time (min): 15 min     Billable Minutes: Evaluation 15      02/25/2025

## 2025-02-25 NOTE — NURSING
Pt is due for IV site rotation. Pt was educated on the need of it but pt refuses for it to be done today despite the site being red. He rather it be done tomorrow. Asked X3 pt denied.

## 2025-02-25 NOTE — SUBJECTIVE & OBJECTIVE
Past Medical History:   Diagnosis Date    Atrial fibrillation     Bulging disc     Cirrhosis     Colon polyp 2017    Rpt 5 yrs    EV (esophageal varices)     Hypertension 2023    Renal disorder     Skin cancer 2017    Thrombocytopenia        Past Surgical History:   Procedure Laterality Date    CATHETERIZATION OF BOTH LEFT AND RIGHT HEART N/A 2023    Procedure: CATHETERIZATION, HEART, BOTH LEFT AND RIGHT;  Surgeon: Flo Malone MD;  Location: [facility] CATH LAB;  Service: Cardiology;  Laterality: N/A;  low bleeding risk 1.0%    CHOLECYSTECTOMY      COLONOSCOPY      COLONOSCOPY N/A 2017    ta rpt     CORONARY ANGIOGRAPHY N/A 2023    Procedure: ANGIOGRAM, CORONARY ARTERY;  Surgeon: Flo Malone MD;  Location: [facility] CATH LAB;  Service: Cardiology;  Laterality: N/A;    HERNIA REPAIR      SKIN BIOPSY  2017    TONSILLECTOMY      UPPER GASTROINTESTINAL ENDOSCOPY      EV    UPPER GASTROINTESTINAL ENDOSCOPY  2016    VASECTOMY         Review of patient's allergies indicates:  No Known Allergies    No current facility-administered medications on file prior to encounter.     Current Outpatient Medications on File Prior to Encounter   Medication Sig    famotidine (PEPCID) 20 MG tablet TAKE 1 TABLET BY MOUTH EVERY OTHER DAY    furosemide (LASIX) 80 MG tablet Take 80 mg by mouth every Tuesday, Thursday, Saturday, .    lactulose (CHRONULAC) 20 gram/30 mL Soln Take 45 mLs (30 g total) by mouth 3 (three) times daily. TITRATE TO 3-4 BOWEL MOVEMENTS DAILY.    metoprolol tartrate (LOPRESSOR) 25 MG tablet TAKE 0.5 TABLETS BY MOUTH 2 TIMES DAILY.    midodrine (PROAMATINE) 5 MG Tab Take 3 tablets (15 mg total) by mouth every 8 (eight) hours.    potassium chloride (K-TAB) 20 mEq SMARTSI Tablet(s) By Mouth    tamsulosin (FLOMAX) 0.4 mg Cap Take by mouth.    tuberculin,purif.prot.deriv. (TUBERSOL IDRM) Inject 0.1 mLs into the skin.     Family History       Problem Relation (Age of Onset)     Cancer Maternal Uncle    Heart disease Maternal Uncle    Hyperlipidemia Brother    Ovarian cancer Sister          Tobacco Use    Smoking status: Never     Passive exposure: Never    Smokeless tobacco: Never   Substance and Sexual Activity    Alcohol use: Not Currently     Alcohol/week: 0.0 standard drinks of alcohol    Drug use: No    Sexual activity: Not Currently     Review of Systems   Constitutional:  Positive for activity change, appetite change and fatigue. Negative for chills and fever.   HENT:  Negative for ear pain, mouth sores, nosebleeds and sore throat.    Eyes:  Negative for visual disturbance.   Respiratory:  Positive for cough, shortness of breath and wheezing. Negative for chest tightness and stridor.    Cardiovascular:  Positive for leg swelling. Negative for chest pain and palpitations.   Gastrointestinal:  Negative for abdominal distention, abdominal pain, blood in stool, constipation, diarrhea, nausea and vomiting.   Endocrine: Negative for polyphagia.   Genitourinary:  Positive for difficulty urinating. Negative for dysuria, flank pain and frequency.   Musculoskeletal:  Positive for gait problem. Negative for myalgias.   Skin:  Positive for color change and rash.   Neurological:  Positive for weakness. Negative for dizziness, tremors, seizures, syncope, facial asymmetry, speech difficulty and headaches.   Hematological:  Negative for adenopathy.   Psychiatric/Behavioral:  Positive for confusion. Negative for agitation and hallucinations. The patient is not nervous/anxious.      Objective:     Vital Signs (Most Recent):  Temp: 98.2 °F (36.8 °C) (02/25/25 0435)  Pulse: 100 (02/25/25 0757)  Resp: 20 (02/25/25 0757)  BP: (!) 107/58 (02/25/25 0435)  SpO2: 97 % (02/25/25 0757) Vital Signs (24h Range):  Temp:  [97.9 °F (36.6 °C)-98.2 °F (36.8 °C)] 98.2 °F (36.8 °C)  Pulse:  [] 100  Resp:  [15-20] 20  SpO2:  [97 %-98 %] 97 %  BP: ()/(53-62) 107/58     Weight: 131.5 kg (290 lb)  Body mass  index is 38.26 kg/m².     Physical Exam  Vitals and nursing note reviewed.   Constitutional:       General: He is not in acute distress.     Appearance: He is obese. He is ill-appearing. He is not diaphoretic.   HENT:      Head: Normocephalic and atraumatic.      Right Ear: External ear normal.      Left Ear: External ear normal.      Nose: Nose normal. No rhinorrhea.      Mouth/Throat:      Mouth: Mucous membranes are moist.      Pharynx: No oropharyngeal exudate or posterior oropharyngeal erythema.   Eyes:      General: Scleral icterus present.      Extraocular Movements: Extraocular movements intact.   Neck:      Vascular: No carotid bruit.   Cardiovascular:      Rate and Rhythm: Normal rate. Rhythm irregular.      Heart sounds: Normal heart sounds. No murmur heard.     No friction rub. No gallop.   Pulmonary:      Effort: No respiratory distress.      Breath sounds: No stridor. Wheezing and rhonchi present. No rales.   Chest:      Chest wall: No tenderness.   Abdominal:      General: There is no distension.      Palpations: There is no mass.      Tenderness: There is no abdominal tenderness. There is no right CVA tenderness, left CVA tenderness, guarding or rebound.      Hernia: No hernia is present.   Musculoskeletal:         General: No swelling, tenderness or deformity.      Cervical back: No rigidity.      Right lower leg: Edema present.      Left lower leg: Edema present.   Lymphadenopathy:      Cervical: No cervical adenopathy.   Skin:     General: Skin is warm.      Capillary Refill: Capillary refill takes less than 2 seconds.      Coloration: Skin is jaundiced.      Findings: Erythema and rash present.          Neurological:      Mental Status: He is alert.      Cranial Nerves: No cranial nerve deficit.      Sensory: No sensory deficit.      Motor: Weakness present.      Coordination: Coordination normal.   Psychiatric:         Mood and Affect: Mood normal.         Behavior: Behavior normal.          Thought Content: Thought content normal.         Judgment: Judgment normal.                Significant Labs: All pertinent labs within the past 24 hours have been reviewed.  Recent Lab Results         02/25/25  0516        Albumin 2.0       ALP 74       ALT 13       Anion Gap 8       AST 36       Baso # 0.04       Basophil % 0.6       BILIRUBIN TOTAL 3.9  Comment: For infants and newborns, interpretation of results should be based  on gestational age, weight and in agreement with clinical  observations.    Premature Infant recommended reference ranges:  Up to 24 hours.............<8.0 mg/dL  Up to 48 hours............<12.0 mg/dL  3-5 days..................<15.0 mg/dL  6-29 days.................<15.0 mg/dL         BUN 18       Calcium 8.1       Chloride 99       CO2 27       Creatinine 2.0       Differential Method Automated       eGFR 35.0       Eos # 0.0       Eos % 0.6       Glucose 98       Gran # (ANC) 5.0       Gran % 71.6       Hematocrit 31.6       Hemoglobin 10.3       Immature Grans (Abs) 0.03  Comment: Mild elevation in immature granulocytes is non specific and   can be seen in a variety of conditions including stress response,   acute inflammation, trauma and pregnancy. Correlation with other   laboratory and clinical findings is essential.         Immature Granulocytes 0.4       Lymph # 0.5       Lymph % 7.1       Magnesium  1.3       MCH 30.1       MCHC 32.6       MCV 92       Mono # 1.4       Mono % 19.7       MPV 11.6       nRBC 0       Phosphorus Level 3.1       Platelet Count 52       Potassium 3.5       PROTEIN TOTAL 6.4       RBC 3.42       RDW 19.0       Sodium 134       WBC 6.91               Significant Imaging: I have reviewed all pertinent imaging results/findings within the past 24 hours.

## 2025-02-25 NOTE — ASSESSMENT & PLAN NOTE
Patient has Abnormal Magnesium: hypomagnesemia. Will continue to monitor electrolytes closely. Will replace the affected electrolytes and repeat labs to be done after interventions completed. The patient's magnesium results have been reviewed and are listed below.  Recent Labs   Lab 02/25/25  0516   MG 1.3*

## 2025-02-25 NOTE — PLAN OF CARE
Plan of care reviewed with patient. Peripheral IV in place and saline locked. Pt tolerated meds well. Droplet precautions maintained. VSS on RA. NADN. Pt voiding dark yellow urine this shift. Pt free from falls and injury. Questions and concerns addressed. Pt belongings and call bell within reach. No further requests or concerns at this time.   Problem: Adult Inpatient Plan of Care  Goal: Plan of Care Review  Outcome: Progressing  Goal: Patient-Specific Goal (Individualized)  Outcome: Progressing  Goal: Absence of Hospital-Acquired Illness or Injury  Outcome: Progressing  Goal: Optimal Comfort and Wellbeing  Outcome: Progressing  Goal: Readiness for Transition of Care  Outcome: Progressing     Problem: Acute Kidney Injury/Impairment  Goal: Fluid and Electrolyte Balance  Outcome: Progressing  Goal: Improved Oral Intake  Outcome: Progressing  Goal: Effective Renal Function  Outcome: Progressing     Problem: Infection  Goal: Absence of Infection Signs and Symptoms  Outcome: Progressing     Problem: Wound  Goal: Optimal Coping  Outcome: Progressing  Goal: Optimal Functional Ability  Outcome: Progressing  Goal: Absence of Infection Signs and Symptoms  Outcome: Progressing  Goal: Improved Oral Intake  Outcome: Progressing  Goal: Optimal Pain Control and Function  Outcome: Progressing  Goal: Skin Health and Integrity  Outcome: Progressing  Goal: Optimal Wound Healing  Outcome: Progressing

## 2025-02-25 NOTE — SUBJECTIVE & OBJECTIVE
Interval History:   Pt stable over weekend- HD held today due to stable labs and ongoing urine output - responding to diuretics.  PT still c/o BLE edema and MACK however has had increased urine output over past several days.      Review of patient's allergies indicates:  No Known Allergies  Current Facility-Administered Medications   Medication Frequency    cefTRIAXone injection 1 g Q12H    diclofenac sodium 1 % gel 4 g TID    famotidine tablet 20 mg Daily    furosemide tablet 120 mg BID    lactulose 20 gram/30 mL solution Soln 30 g TID    levalbuterol nebulizer solution 1.25 mg QID WAKE    magnesium chloride TBEC tablet TbEC 128 mg Daily    melatonin tablet 6 mg Nightly PRN    [START ON 2/25/2025] metOLazone tablet 5 mg Daily    metoprolol tartrate (LOPRESSOR) tablet 25 mg BID    midodrine tablet 5 mg Q8H    mupirocin 2 % ointment BID    ondansetron injection 4 mg Q8H PRN    potassium chloride SA CR tablet 40 mEq BID    sodium chloride 0.9% flush 10 mL PRN    tamsulosin 24 hr capsule 0.4 mg Daily    thiamine tablet 100 mg Daily    traMADoL tablet 50 mg Q6H PRN       Objective:     Vital Signs (Most Recent):  Temp: 98.2 °F (36.8 °C) (02/24/25 1717)  Pulse: 90 (02/24/25 1900)  Resp: 18 (02/24/25 1900)  BP: 106/62 (02/24/25 1717)  SpO2: 98 % (02/24/25 1900) Vital Signs (24h Range):  Temp:  [97.8 °F (36.6 °C)-98.2 °F (36.8 °C)] 98.2 °F (36.8 °C)  Pulse:  [] 90  Resp:  [18-20] 18  SpO2:  [95 %-100 %] 98 %  BP: ()/(53-62) 106/62     Weight: 131.5 kg (290 lb) (02/21/25 2200)  Body mass index is 38.26 kg/m².  Body surface area is 2.6 meters squared.    I/O last 3 completed shifts:  In: 990 [P.O.:990]  Out: -      Physical Exam  Vitals reviewed.   Constitutional:       Appearance: Normal appearance. He is obese.   HENT:      Head: Normocephalic and atraumatic.   Cardiovascular:      Rate and Rhythm: Normal rate and regular rhythm.      Heart sounds: Normal heart sounds.   Pulmonary:      Effort: Pulmonary effort  is normal.      Breath sounds: Normal breath sounds.   Abdominal:      General: Bowel sounds are normal. There is distension.      Palpations: Abdomen is soft.   Musculoskeletal:         General: Swelling present.      Right lower leg: Edema present.      Left lower leg: Edema present.   Skin:     General: Skin is warm and dry.   Neurological:      General: No focal deficit present.      Mental Status: He is alert and oriented to person, place, and time. Mental status is at baseline.   Psychiatric:         Mood and Affect: Mood normal.         Behavior: Behavior normal.         Thought Content: Thought content normal.         Judgment: Judgment normal.          Significant Labs:  CBC:   Recent Labs   Lab 02/24/25  0522   WBC 7.71   RBC 3.29*   HGB 9.8*   HCT 29.7*   PLT 45*   MCV 90   MCH 29.8   MCHC 33.0     CMP:   Recent Labs   Lab 02/24/25  0522      CALCIUM 8.0*   ALBUMIN 1.9*   PROT 6.1   *   K 3.2*   CO2 26      BUN 16   CREATININE 1.8*   ALKPHOS 70   ALT 12   AST 39   BILITOT 3.1*     Coagulation:   Recent Labs   Lab 02/21/25  1842   INR 1.9*     Recent Labs   Lab 02/22/25  1500   COLORU Yellow   SPECGRAV 1.015   PHUR 6.0   PROTEINUA 1+*   BACTERIA None   NITRITE Positive*   LEUKOCYTESUR 2+*   UROBILINOGEN Negative   HYALINECASTS 10.3*        Significant Imaging:  Labs: Reviewed  X-Ray: Reviewed  US: Reviewed  CT: Reviewed

## 2025-02-25 NOTE — PROGRESS NOTES
[facility]  Nephrology  Progress Note    Patient Name: Juan Carlos Yoo Sr.  MRN: [id number]  Admission Date: 2/21/2025  Hospital Length of Stay: 2 days  Attending Provider: Joao Villegas III, MD   Primary Care Physician: Joao Villegas III, MD  Principal Problem:Rhinovirus infection    Subjective:     HPI: No notes on file    Interval History:   Pt stable over weekend- HD held today due to stable labs and ongoing urine output - responding to diuretics.  PT still c/o BLE edema and MACK however has had increased urine output over past several days.      Review of patient's allergies indicates:  No Known Allergies  Current Facility-Administered Medications   Medication Frequency    cefTRIAXone injection 1 g Q12H    diclofenac sodium 1 % gel 4 g TID    famotidine tablet 20 mg Daily    furosemide tablet 120 mg BID    lactulose 20 gram/30 mL solution Soln 30 g TID    levalbuterol nebulizer solution 1.25 mg QID WAKE    magnesium chloride TBEC tablet TbEC 128 mg Daily    melatonin tablet 6 mg Nightly PRN    [START ON 2/25/2025] metOLazone tablet 5 mg Daily    metoprolol tartrate (LOPRESSOR) tablet 25 mg BID    midodrine tablet 5 mg Q8H    mupirocin 2 % ointment BID    ondansetron injection 4 mg Q8H PRN    potassium chloride SA CR tablet 40 mEq BID    sodium chloride 0.9% flush 10 mL PRN    tamsulosin 24 hr capsule 0.4 mg Daily    thiamine tablet 100 mg Daily    traMADoL tablet 50 mg Q6H PRN       Objective:     Vital Signs (Most Recent):  Temp: 98.2 °F (36.8 °C) (02/24/25 1717)  Pulse: 90 (02/24/25 1900)  Resp: 18 (02/24/25 1900)  BP: 106/62 (02/24/25 1717)  SpO2: 98 % (02/24/25 1900) Vital Signs (24h Range):  Temp:  [97.8 °F (36.6 °C)-98.2 °F (36.8 °C)] 98.2 °F (36.8 °C)  Pulse:  [] 90  Resp:  [18-20] 18  SpO2:  [95 %-100 %] 98 %  BP: ()/(53-62) 106/62     Weight: 131.5 kg (290 lb) (02/21/25 2200)  Body mass index is 38.26 kg/m².  Body surface area is 2.6 meters squared.    I/O last 3 completed  shifts:  In: 990 [P.O.:990]  Out: -      Physical Exam  Vitals reviewed.   Constitutional:       Appearance: Normal appearance. He is obese.   HENT:      Head: Normocephalic and atraumatic.   Cardiovascular:      Rate and Rhythm: Normal rate and regular rhythm.      Heart sounds: Normal heart sounds.   Pulmonary:      Effort: Pulmonary effort is normal.      Breath sounds: Normal breath sounds.   Abdominal:      General: Bowel sounds are normal. There is distension.      Palpations: Abdomen is soft.   Musculoskeletal:         General: Swelling present.      Right lower leg: Edema present.      Left lower leg: Edema present.   Skin:     General: Skin is warm and dry.   Neurological:      General: No focal deficit present.      Mental Status: He is alert and oriented to person, place, and time. Mental status is at baseline.   Psychiatric:         Mood and Affect: Mood normal.         Behavior: Behavior normal.         Thought Content: Thought content normal.         Judgment: Judgment normal.          Significant Labs:  CBC:   Recent Labs   Lab 02/24/25  0522   WBC 7.71   RBC 3.29*   HGB 9.8*   HCT 29.7*   PLT 45*   MCV 90   MCH 29.8   MCHC 33.0     CMP:   Recent Labs   Lab 02/24/25  0522      CALCIUM 8.0*   ALBUMIN 1.9*   PROT 6.1   *   K 3.2*   CO2 26      BUN 16   CREATININE 1.8*   ALKPHOS 70   ALT 12   AST 39   BILITOT 3.1*     Coagulation:   Recent Labs   Lab 02/21/25  1842   INR 1.9*     Recent Labs   Lab 02/22/25  1500   COLORU Yellow   SPECGRAV 1.015   PHUR 6.0   PROTEINUA 1+*   BACTERIA None   NITRITE Positive*   LEUKOCYTESUR 2+*   UROBILINOGEN Negative   HYALINECASTS 10.3*        Significant Imaging:  Labs: Reviewed  X-Ray: Reviewed  US: Reviewed  CT: Reviewed  Assessment/Plan:     Renal/  Hypomagnesemia  Hypomagnesemia- replace with Mg sulfate to correct if persists.  Monitor level daily.    Hypokalemia  Hypokalemia - prob due to diuretics- replace with oral KCL supp as needed.  Monitor  serum Mg level and replace as needed.    CKD (chronic kidney disease) stage 4, GFR 15-29 ml/min  CKD-4 - JAE- pt started on HD- last hosp admission- 12/24- last HD was last week Friday 12/21- dialysis held today due to stable creat over weekend and increased urine output.  Lasix dose increased with metolazone added for additional diuresis.  Avoid NSAIDs, contrast, nephrotoxins    Monitor strict I/Os, daily weights  Renally dose medications to current GFR  Will continue to monitor.        Thank you for your consult. I will follow-up with patient. Please contact us if you have any additional questions.    Adali Garcia MD  Nephrology  [facility]

## 2025-02-25 NOTE — ASSESSMENT & PLAN NOTE
Hypokalemia - prob due to diuretics- replace with oral KCL supp as needed.  Monitor serum Mg level and replace as needed.

## 2025-02-25 NOTE — PLAN OF CARE
02/25/25 1343   Medicare Message   Important Message from Medicare regarding Discharge Appeal Rights Given to patient/caregiver;Explained to patient/caregiver;Signed/date by patient/caregiver   Date IMM was signed 02/25/25   Time IMM was signed 1300

## 2025-02-25 NOTE — PLAN OF CARE
Problem: Occupational Therapy  Goal: Occupational Therapy Goal  Description: Goals to be met by: 3/7/2025     Patient will increase functional independence with ADLs by performing:    Toileting from toilet with Set-up Assistance for hygiene and clothing management.   Sitting at edge of bed x15 minutes with Modified [place].  Supine to sit with Modified [place].  Toilet transfer to toilet with Modified [place].  Increased functional strength to WFL for increased efficiency completing ADLs.    Outcome: Progressing

## 2025-02-25 NOTE — PROGRESS NOTES
[facility] Medicine  Progress Note    Patient Name: Juan Carlos Yoo Sr.  MRN: [id number]  Patient Class: IP- Inpatient   Admission Date: 2/21/2025  Length of Stay: 3 days  Attending Physician: Joao Villegas III, MD  Primary Care Provider: Joao Villegas III, MD        Subjective     Principal Problem:Rhinovirus infection        HPI:  ED HPI:  Juan Carlos Yoo Sr. is a 69 y.o. male with PMHX of alcoholic cirrhosis, HCC, thrombocytopenia, Afib (not on anticoagulation due to thrombocytopenia), ESRD on HD M/W/F who presents to the emergency department C/O SOB.     Patient was recently  admitted to [facility] due to severe anasarca JAE, evaluated for hepatorenal syndrome and had 40 day hospitalization.  Patient released from rehab about a week ago.  Has endorse increasing shortness of breath for the past 3 days.  States at night it is worse when trying to sleep.  Patient lays on his side and uses 3 pillows to prop his head up.  Patient went to urgent care today because he felt like tonight was going to be another bad night and they sent him to ER.  He denies fever.  States swelling is about baseline.  Had typical dialysis session today.  He reports they were trying to take off 3 L but is unsure of how much volume was removed.  He makes urine but states it is difficult to urinate due to anasarca.  He states compliance to low-sodium diet and fluid restriction since recent discharge.  Has not had alcohol in over 7 months.    IM HPI:  Patient's chart reviewed and above history noted.  Patient's been admitted for acute on chronic dyspnea and was diagnosed with rhinovirus.  Patient had been having increasing dyspnea weakness coughing with clear sputum production over the last 36 hours and ultimately presented with worsening shortness of breath.  He has been placed on oxygen nasal cannula states he is feeling much better.  Patient has a mild cough and wheezing on exam.  Patient has a complex  medical history that is has been reviewed he seems slightly hypervolemic we will notify his nephrologist and he will likely need his routine hemodialysis if he ends up staying through Monday.  Patient is afebrile labs noted potassium low we will replete and maybe a good candidate for Aldactone.    Overview/Hospital Course:  2/23 FM:  Patient complaining of a area of redness and pain on the right thigh above the knee.  This started yesterday.  Patient's exam consistent with a area of focal cellulitis and induration.  Ordering ultrasound and starting antibiotics.  Patient's respiratory complaints are much improved and getting back to his baseline.  Patient's nephrologist aware he is in house and may need hemodialysis in a.m..    2/24 DL:  Patient doing well this morning.  He is sitting up in bed in his awake, alert, oriented.  Patient reports respiratory status improved.  Patient now off of supplemental O2.  Patient reports continued tenderness to area of redness to right inner thigh.  Redness and induration have spread past previously marked borders.  Patient is scheduled for ultrasound today.  Continue IV antibiotics.  Patient is scheduled for hemodialysis today.  Nephrology following.    2/25 DL:  Patient doing well this morning.  He is lying in bed in his awake, alert, oriented.  Respiratory status with continued improvement.  Labs and vital signs stable.  Ultrasound right thigh obtained yesterday reveals 12.6 x 6.1 x 3.0 cm complex fluid collection to the medial right thigh.  Venous ultrasound negative for DVT.  General surgery consulted.  Continue current IV antibiotics.  Renal function stable.  Nephrology following.    Past Medical History:   Diagnosis Date    Atrial fibrillation     Bulging disc     Cirrhosis     Colon polyp 12/29/2017    Rpt 5 yrs    EV (esophageal varices)     Hypertension 03/30/2023    Renal disorder     Skin cancer 03/2017    Thrombocytopenia        Past Surgical History:   Procedure  Laterality Date    CATHETERIZATION OF BOTH LEFT AND RIGHT HEART N/A 2023    Procedure: CATHETERIZATION, HEART, BOTH LEFT AND RIGHT;  Surgeon: Flo Malone MD;  Location: [facility] CATH LAB;  Service: Cardiology;  Laterality: N/A;  low bleeding risk 1.0%    CHOLECYSTECTOMY      COLONOSCOPY      COLONOSCOPY N/A 2017    ta rpt     CORONARY ANGIOGRAPHY N/A 2023    Procedure: ANGIOGRAM, CORONARY ARTERY;  Surgeon: Flo Malone MD;  Location: [facility] CATH LAB;  Service: Cardiology;  Laterality: N/A;    HERNIA REPAIR      SKIN BIOPSY  2017    TONSILLECTOMY      UPPER GASTROINTESTINAL ENDOSCOPY      EV    UPPER GASTROINTESTINAL ENDOSCOPY      VASECTOMY         Review of patient's allergies indicates:  No Known Allergies    No current facility-administered medications on file prior to encounter.     Current Outpatient Medications on File Prior to Encounter   Medication Sig    famotidine (PEPCID) 20 MG tablet TAKE 1 TABLET BY MOUTH EVERY OTHER DAY    furosemide (LASIX) 80 MG tablet Take 80 mg by mouth every Tuesday, Thursday, Saturday, .    lactulose (CHRONULAC) 20 gram/30 mL Soln Take 45 mLs (30 g total) by mouth 3 (three) times daily. TITRATE TO 3-4 BOWEL MOVEMENTS DAILY.    metoprolol tartrate (LOPRESSOR) 25 MG tablet TAKE 0.5 TABLETS BY MOUTH 2 TIMES DAILY.    midodrine (PROAMATINE) 5 MG Tab Take 3 tablets (15 mg total) by mouth every 8 (eight) hours.    potassium chloride (K-TAB) 20 mEq SMARTSI Tablet(s) By Mouth    tamsulosin (FLOMAX) 0.4 mg Cap Take by mouth.    tuberculin,purif.prot.deriv. (TUBERSOL IDRM) Inject 0.1 mLs into the skin.     Family History       Problem Relation (Age of Onset)    Cancer Maternal Uncle    Heart disease Maternal Uncle    Hyperlipidemia Brother    Ovarian cancer Sister          Tobacco Use    Smoking status: Never     Passive exposure: Never    Smokeless tobacco: Never   Substance and Sexual Activity    Alcohol use: Not Currently     Alcohol/week: 0.0  standard drinks of alcohol    Drug use: No    Sexual activity: Not Currently     Review of Systems   Constitutional:  Positive for activity change, appetite change and fatigue. Negative for chills and fever.   HENT:  Negative for ear pain, mouth sores, nosebleeds and sore throat.    Eyes:  Negative for visual disturbance.   Respiratory:  Positive for cough, shortness of breath and wheezing. Negative for chest tightness and stridor.    Cardiovascular:  Positive for leg swelling. Negative for chest pain and palpitations.   Gastrointestinal:  Negative for abdominal distention, abdominal pain, blood in stool, constipation, diarrhea, nausea and vomiting.   Endocrine: Negative for polyphagia.   Genitourinary:  Positive for difficulty urinating. Negative for dysuria, flank pain and frequency.   Musculoskeletal:  Positive for gait problem. Negative for myalgias.   Skin:  Positive for color change and rash.   Neurological:  Positive for weakness. Negative for dizziness, tremors, seizures, syncope, facial asymmetry, speech difficulty and headaches.   Hematological:  Negative for adenopathy.   Psychiatric/Behavioral:  Positive for confusion. Negative for agitation and hallucinations. The patient is not nervous/anxious.      Objective:     Vital Signs (Most Recent):  Temp: 98.2 °F (36.8 °C) (02/25/25 0435)  Pulse: 100 (02/25/25 0757)  Resp: 20 (02/25/25 0757)  BP: (!) 107/58 (02/25/25 0435)  SpO2: 97 % (02/25/25 0757) Vital Signs (24h Range):  Temp:  [97.9 °F (36.6 °C)-98.2 °F (36.8 °C)] 98.2 °F (36.8 °C)  Pulse:  [] 100  Resp:  [15-20] 20  SpO2:  [97 %-98 %] 97 %  BP: ()/(53-62) 107/58     Weight: 131.5 kg (290 lb)  Body mass index is 38.26 kg/m².     Physical Exam  Vitals and nursing note reviewed.   Constitutional:       General: He is not in acute distress.     Appearance: He is obese. He is ill-appearing. He is not diaphoretic.   HENT:      Head: Normocephalic and atraumatic.      Right Ear: External ear  normal.      Left Ear: External ear normal.      Nose: Nose normal. No rhinorrhea.      Mouth/Throat:      Mouth: Mucous membranes are moist.      Pharynx: No oropharyngeal exudate or posterior oropharyngeal erythema.   Eyes:      General: Scleral icterus present.      Extraocular Movements: Extraocular movements intact.   Neck:      Vascular: No carotid bruit.   Cardiovascular:      Rate and Rhythm: Normal rate. Rhythm irregular.      Heart sounds: Normal heart sounds. No murmur heard.     No friction rub. No gallop.   Pulmonary:      Effort: No respiratory distress.      Breath sounds: No stridor. Wheezing and rhonchi present. No rales.   Chest:      Chest wall: No tenderness.   Abdominal:      General: There is no distension.      Palpations: There is no mass.      Tenderness: There is no abdominal tenderness. There is no right CVA tenderness, left CVA tenderness, guarding or rebound.      Hernia: No hernia is present.   Musculoskeletal:         General: No swelling, tenderness or deformity.      Cervical back: No rigidity.      Right lower leg: Edema present.      Left lower leg: Edema present.   Lymphadenopathy:      Cervical: No cervical adenopathy.   Skin:     General: Skin is warm.      Capillary Refill: Capillary refill takes less than 2 seconds.      Coloration: Skin is jaundiced.      Findings: Erythema and rash present.          Neurological:      Mental Status: He is alert.      Cranial Nerves: No cranial nerve deficit.      Sensory: No sensory deficit.      Motor: Weakness present.      Coordination: Coordination normal.   Psychiatric:         Mood and Affect: Mood normal.         Behavior: Behavior normal.         Thought Content: Thought content normal.         Judgment: Judgment normal.                Significant Labs: All pertinent labs within the past 24 hours have been reviewed.  Recent Lab Results         02/25/25  0516        Albumin 2.0       ALP 74       ALT 13       Anion Gap 8       AST  36       Baso # 0.04       Basophil % 0.6       BILIRUBIN TOTAL 3.9  Comment: For infants and newborns, interpretation of results should be based  on gestational age, weight and in agreement with clinical  observations.    Premature Infant recommended reference ranges:  Up to 24 hours.............<8.0 mg/dL  Up to 48 hours............<12.0 mg/dL  3-5 days..................<15.0 mg/dL  6-29 days.................<15.0 mg/dL         BUN 18       Calcium 8.1       Chloride 99       CO2 27       Creatinine 2.0       Differential Method Automated       eGFR 35.0       Eos # 0.0       Eos % 0.6       Glucose 98       Gran # (ANC) 5.0       Gran % 71.6       Hematocrit 31.6       Hemoglobin 10.3       Immature Grans (Abs) 0.03  Comment: Mild elevation in immature granulocytes is non specific and   can be seen in a variety of conditions including stress response,   acute inflammation, trauma and pregnancy. Correlation with other   laboratory and clinical findings is essential.         Immature Granulocytes 0.4       Lymph # 0.5       Lymph % 7.1       Magnesium  1.3       MCH 30.1       MCHC 32.6       MCV 92       Mono # 1.4       Mono % 19.7       MPV 11.6       nRBC 0       Phosphorus Level 3.1       Platelet Count 52       Potassium 3.5       PROTEIN TOTAL 6.4       RBC 3.42       RDW 19.0       Sodium 134       WBC 6.91               Significant Imaging: I have reviewed all pertinent imaging results/findings within the past 24 hours.    Assessment and Plan     * Rhinovirus infection  Conservative care, + now with O2 needs.    2/24:  Continue conservative care.  Symptoms improving.  Patient with stable O2 sats on room air.      Hypomagnesemia  Patient has Abnormal Magnesium: hypomagnesemia. Will continue to monitor electrolytes closely. Will replace the affected electrolytes and repeat labs to be done after interventions completed. The patient's magnesium results have been reviewed and are listed below.  Recent Labs    Lab 02/25/25  0516   MG 1.3*        Cellulitis of right lower extremity  2/23 FM:  Starting abx, get US.    2/24:  Increased induration and erythema noted.  Ultrasound pending.  Patient afebrile without leukocytosis.  Continue IV antibiotics.      2/25:  Ultrasound obtained yesterday reveals 12.6 x 6.1 x 3.0 cm complex fluid collection to patient's medial right thigh.  General surgery consulted.  Appreciate recs.  Patient afebrile without leukocytosis.  Continue current IV antibiotic treatment.      Hypokalemia  Patient's most recent potassium results are listed below.   Recent Labs     02/23/25  0515 02/24/25  0522 02/25/25  0516   K 3.2* 3.2* 3.5     Plan  - Replete potassium per protocol  - Monitor potassium Daily  - Patient's hypokalemia is stable  - Nephrology following.  Appreciate recs.    Morbid obesity  Body mass index is 38.26 kg/m². Morbid obesity complicates all aspects of disease management from diagnostic modalities to treatment. Weight loss encouraged and health benefits explained to patient.         Moderate protein-calorie malnutrition  Nutrition consulted. Most recent weight and BMI monitored-     Measurements:  Wt Readings from Last 1 Encounters:   02/21/25 131.5 kg (290 lb)   Body mass index is 38.26 kg/m².    Patient has been screened and assessed by RD.    Malnutrition Type:  Context:    Level:      Malnutrition Characteristic Summary:       Interventions/Recommendations (treatment strategy):         Hypotension  Blood pressure stable.  Continue monitoring.      Hepatic encephalopathy  Continue lactulose.      CKD (chronic kidney disease) stage 4, GFR 15-29 ml/min  Creatine stable for now. BMP reviewed- noted Estimated Creatinine Clearance: 48.2 mL/min (A) (based on SCr of 2 mg/dL (H)). according to latest data. Based on current GFR, CKD stage is stage 5 - GFR < 15.  Monitor UOP and serial BMP and adjust therapy as needed. Renally dose meds. Avoid nephrotoxic medications and  procedures.    Per renal, currently on MWF HD, ? Still needs this?    Suspected Hepatorenal syndrome  Slightly hypervolemic today, much improved from past care.      Longstanding persistent atrial fibrillation  Patient has long standing persistent (>12 months) atrial fibrillation. Patient is currently in atrial fibrillation. [id number] Score: 1. The patients heart rate in the last 24 hours is as follows:  Pulse  Min: 87  Max: 100     Antiarrhythmics  metoprolol tartrate (LOPRESSOR) tablet 25 mg, 2 times daily, Oral    Anticoagulants       Plan  - Replete lytes with a goal of K>4, Mg >2  - Patient is anticoagulated, see medications listed above.  - Patient's afib is currently controlled    Auto anticoagulated with CLD.        Coagulopathy  CLD      Cirrhosis  Patient with known Cirrhosis with Child's class C. Co-morbidities are present and inclusive of portal hypertension, hepatic encephalopathy, malnutrition, anemia/pancytopenia, and anticoagulation.  MELD-Na score calculated; MELD 3.0: 28 at 2/23/2025  5:15 AM  MELD-Na: 26 at 2/23/2025  5:15 AM  Calculated from:  Serum Creatinine: 1.8 mg/dL at 2/23/2025  5:15 AM  Serum Sodium: 133 mmol/L at 2/23/2025  5:15 AM  Total Bilirubin: 3.9 mg/dL at 2/23/2025  5:15 AM  Serum Albumin: 2.1 g/dL at 2/23/2025  5:15 AM  INR(ratio): 1.9 at 2/21/2025  6:42 PM  Age at listing (hypothetical): 69 years  Sex: Male at 2/23/2025  5:15 AM      Continue chronic meds. Etiology likely ETOH. Will avoid any hepatotoxic meds, and monitor CBC/CMP/INR for synthetic function.       VTE Risk Mitigation (From admission, onward)           Ordered     Place sequential compression device  Until discontinued         02/22/25 1002     IP VTE HIGH RISK PATIENT  Once         02/21/25 2204     Place ANDREW hose  Until discontinued         02/21/25 2204                    Discharge Planning   AUTUMN:      Code Status: Full Code   Medical Readiness for Discharge Date:   Discharge Plan A: Home                Please  place Justification for DME        Pilar Gates NP  Department of Hospital Medicine   [facility]

## 2025-02-25 NOTE — ASSESSMENT & PLAN NOTE
CKD-4 - JAE- pt started on HD- last hosp admission- 12/24- last HD was last week Friday 12/21- dialysis held today due to stable creat over weekend and increased urine output.  Lasix dose increased with metolazone added for additional diuresis.  Avoid NSAIDs, contrast, nephrotoxins    Monitor strict I/Os, daily weights  Renally dose medications to current GFR  Will continue to monitor.

## 2025-02-25 NOTE — ASSESSMENT & PLAN NOTE
Patient's most recent potassium results are listed below.   Recent Labs     02/23/25  0515 02/24/25  0522 02/25/25  0516   K 3.2* 3.2* 3.5     Plan  - Replete potassium per protocol  - Monitor potassium Daily  - Patient's hypokalemia is stable  - Nephrology following.  Appreciate recs.

## 2025-02-26 ENCOUNTER — ANESTHESIA EVENT (OUTPATIENT)
Dept: SURGERY | Facility: HOSPITAL | Age: 70
End: 2025-02-26
Payer: COMMERCIAL

## 2025-02-26 LAB
ALBUMIN SERPL BCP-MCNC: 1.9 G/DL (ref 3.5–5.2)
ALP SERPL-CCNC: 70 U/L (ref 55–135)
ALT SERPL W/O P-5'-P-CCNC: 12 U/L (ref 10–44)
ANION GAP SERPL CALC-SCNC: 8 MMOL/L (ref 8–16)
AST SERPL-CCNC: 31 U/L (ref 10–40)
BASOPHILS # BLD AUTO: 0.04 K/UL (ref 0–0.2)
BASOPHILS NFR BLD: 0.6 % (ref 0–1.9)
BILIRUB SERPL-MCNC: 3.2 MG/DL (ref 0.1–1)
BUN SERPL-MCNC: 21 MG/DL (ref 8–23)
CALCIUM SERPL-MCNC: 8 MG/DL (ref 8.7–10.5)
CHLORIDE SERPL-SCNC: 98 MMOL/L (ref 95–110)
CO2 SERPL-SCNC: 29 MMOL/L (ref 23–29)
CREAT SERPL-MCNC: 1.9 MG/DL (ref 0.5–1.4)
DIFFERENTIAL METHOD BLD: ABNORMAL
EOSINOPHIL # BLD AUTO: 0.1 K/UL (ref 0–0.5)
EOSINOPHIL NFR BLD: 1.1 % (ref 0–8)
ERYTHROCYTE [DISTWIDTH] IN BLOOD BY AUTOMATED COUNT: 19.1 % (ref 11.5–14.5)
EST. GFR  (NO RACE VARIABLE): 37.2 ML/MIN/1.73 M^2
GLUCOSE SERPL-MCNC: 101 MG/DL (ref 70–110)
HCT VFR BLD AUTO: 29.8 % (ref 40–54)
HGB BLD-MCNC: 9.8 G/DL (ref 14–18)
IMM GRANULOCYTES # BLD AUTO: 0.04 K/UL (ref 0–0.04)
IMM GRANULOCYTES NFR BLD AUTO: 0.6 % (ref 0–0.5)
LYMPHOCYTES # BLD AUTO: 0.7 K/UL (ref 1–4.8)
LYMPHOCYTES NFR BLD: 11 % (ref 18–48)
MCH RBC QN AUTO: 29.8 PG (ref 27–31)
MCHC RBC AUTO-ENTMCNC: 32.9 G/DL (ref 32–36)
MCV RBC AUTO: 91 FL (ref 82–98)
MONOCYTES # BLD AUTO: 1.4 K/UL (ref 0.3–1)
MONOCYTES NFR BLD: 21.4 % (ref 4–15)
NEUTROPHILS # BLD AUTO: 4.2 K/UL (ref 1.8–7.7)
NEUTROPHILS NFR BLD: 65.3 % (ref 38–73)
NRBC BLD-RTO: 0 /100 WBC
OHS QRS DURATION: 100 MS
OHS QTC CALCULATION: 502 MS
PLATELET # BLD AUTO: 63 K/UL (ref 150–450)
PMV BLD AUTO: 12 FL (ref 9.2–12.9)
POTASSIUM SERPL-SCNC: 2.9 MMOL/L (ref 3.5–5.1)
PROT SERPL-MCNC: 6.2 G/DL (ref 6–8.4)
RBC # BLD AUTO: 3.29 M/UL (ref 4.6–6.2)
SODIUM SERPL-SCNC: 135 MMOL/L (ref 136–145)
WBC # BLD AUTO: 6.44 K/UL (ref 3.9–12.7)

## 2025-02-26 PROCEDURE — 25000003 PHARM REV CODE 250: Performed by: INTERNAL MEDICINE

## 2025-02-26 PROCEDURE — 99900031 HC PATIENT EDUCATION (STAT)

## 2025-02-26 PROCEDURE — 25000242 PHARM REV CODE 250 ALT 637 W/ HCPCS: Performed by: INTERNAL MEDICINE

## 2025-02-26 PROCEDURE — 85025 COMPLETE CBC W/AUTO DIFF WBC: CPT | Performed by: INTERNAL MEDICINE

## 2025-02-26 PROCEDURE — 94640 AIRWAY INHALATION TREATMENT: CPT

## 2025-02-26 PROCEDURE — 93010 ELECTROCARDIOGRAM REPORT: CPT | Mod: ,,, | Performed by: INTERNAL MEDICINE

## 2025-02-26 PROCEDURE — 25000003 PHARM REV CODE 250: Performed by: EMERGENCY MEDICINE

## 2025-02-26 PROCEDURE — 27000207 HC ISOLATION

## 2025-02-26 PROCEDURE — 99900035 HC TECH TIME PER 15 MIN (STAT)

## 2025-02-26 PROCEDURE — 63600175 PHARM REV CODE 636 W HCPCS: Performed by: INTERNAL MEDICINE

## 2025-02-26 PROCEDURE — 36415 COLL VENOUS BLD VENIPUNCTURE: CPT | Performed by: INTERNAL MEDICINE

## 2025-02-26 PROCEDURE — 93005 ELECTROCARDIOGRAM TRACING: CPT

## 2025-02-26 PROCEDURE — 97530 THERAPEUTIC ACTIVITIES: CPT

## 2025-02-26 PROCEDURE — 11000001 HC ACUTE MED/SURG PRIVATE ROOM

## 2025-02-26 PROCEDURE — 94761 N-INVAS EAR/PLS OXIMETRY MLT: CPT

## 2025-02-26 PROCEDURE — 80053 COMPREHEN METABOLIC PANEL: CPT | Performed by: INTERNAL MEDICINE

## 2025-02-26 RX ORDER — POTASSIUM CHLORIDE 20 MEQ/1
40 TABLET, EXTENDED RELEASE ORAL 3 TIMES DAILY
Status: DISCONTINUED | OUTPATIENT
Start: 2025-02-26 | End: 2025-02-27

## 2025-02-26 RX ORDER — MAGNESIUM SULFATE HEPTAHYDRATE 40 MG/ML
2 INJECTION, SOLUTION INTRAVENOUS ONCE
Status: COMPLETED | OUTPATIENT
Start: 2025-02-26 | End: 2025-02-26

## 2025-02-26 RX ORDER — MAGNESIUM SULFATE 1 G/100ML
1 INJECTION INTRAVENOUS ONCE
Status: COMPLETED | OUTPATIENT
Start: 2025-02-26 | End: 2025-02-26

## 2025-02-26 RX ADMIN — METOPROLOL TARTRATE 25 MG: 25 TABLET, FILM COATED ORAL at 09:02

## 2025-02-26 RX ADMIN — DICLOFENAC SODIUM 4 G: 10 GEL TOPICAL at 03:02

## 2025-02-26 RX ADMIN — LEVALBUTEROL 1.25 MG: 1.25 SOLUTION, CONCENTRATE RESPIRATORY (INHALATION) at 11:02

## 2025-02-26 RX ADMIN — MAGNESIUM SULFATE IN WATER 2 G: 40 INJECTION, SOLUTION INTRAVENOUS at 09:02

## 2025-02-26 RX ADMIN — FUROSEMIDE 120 MG: 40 TABLET ORAL at 09:02

## 2025-02-26 RX ADMIN — METOLAZONE 5 MG: 5 TABLET ORAL at 09:02

## 2025-02-26 RX ADMIN — MIDODRINE HYDROCHLORIDE 5 MG: 5 TABLET ORAL at 09:02

## 2025-02-26 RX ADMIN — MAGNESIUM SULFATE IN WATER 2 G: 40 INJECTION, SOLUTION INTRAVENOUS at 06:02

## 2025-02-26 RX ADMIN — LEVALBUTEROL 1.25 MG: 1.25 SOLUTION, CONCENTRATE RESPIRATORY (INHALATION) at 03:02

## 2025-02-26 RX ADMIN — POTASSIUM CHLORIDE 40 MEQ: 1500 TABLET, EXTENDED RELEASE ORAL at 09:02

## 2025-02-26 RX ADMIN — CEFTRIAXONE SODIUM 1 G: 1 INJECTION, POWDER, FOR SOLUTION INTRAMUSCULAR; INTRAVENOUS at 11:02

## 2025-02-26 RX ADMIN — MAGNESIUM SULFATE IN DEXTROSE 1 G: 10 INJECTION, SOLUTION INTRAVENOUS at 05:02

## 2025-02-26 RX ADMIN — MIDODRINE HYDROCHLORIDE 5 MG: 5 TABLET ORAL at 02:02

## 2025-02-26 RX ADMIN — FUROSEMIDE 120 MG: 40 TABLET ORAL at 05:02

## 2025-02-26 RX ADMIN — DICLOFENAC SODIUM 4 G: 10 GEL TOPICAL at 09:02

## 2025-02-26 RX ADMIN — FAMOTIDINE 20 MG: 20 TABLET, FILM COATED ORAL at 09:02

## 2025-02-26 RX ADMIN — Medication 100 MG: at 09:02

## 2025-02-26 RX ADMIN — MUPIROCIN: 20 OINTMENT TOPICAL at 09:02

## 2025-02-26 RX ADMIN — CEFTRIAXONE SODIUM 1 G: 1 INJECTION, POWDER, FOR SOLUTION INTRAMUSCULAR; INTRAVENOUS at 10:02

## 2025-02-26 RX ADMIN — POTASSIUM CHLORIDE 40 MEQ: 1500 TABLET, EXTENDED RELEASE ORAL at 03:02

## 2025-02-26 RX ADMIN — TAMSULOSIN HYDROCHLORIDE 0.4 MG: 0.4 CAPSULE ORAL at 09:02

## 2025-02-26 RX ADMIN — MAGNESIUM 64 MG (MAGNESIUM CHLORIDE) TABLET,DELAYED RELEASE 128 MG: at 09:02

## 2025-02-26 RX ADMIN — MIDODRINE HYDROCHLORIDE 5 MG: 5 TABLET ORAL at 06:02

## 2025-02-26 RX ADMIN — LEVALBUTEROL 1.25 MG: 1.25 SOLUTION, CONCENTRATE RESPIRATORY (INHALATION) at 07:02

## 2025-02-26 RX ADMIN — LEVALBUTEROL 1.25 MG: 1.25 SOLUTION, CONCENTRATE RESPIRATORY (INHALATION) at 08:02

## 2025-02-26 NOTE — PLAN OF CARE
Problem: Occupational Therapy  Goal: Occupational Therapy Goal  Description: Goals to be met by: 3/7/2025     Patient will increase functional independence with ADLs by performing:    Toileting from toilet with Set-up Assistance for hygiene and clothing management. Not Met   Sitting at edge of bed x15 minutes with Modified [place]. Not Met   Supine to sit with Modified [place]. Not Met   Toilet transfer to toilet with Modified [place]. Not Met   Increased functional strength to WFL for increased efficiency completing ADLs. Not Met     Outcome: Discontinue occupational therapy     Latoya Reich, OT

## 2025-02-26 NOTE — ANESTHESIA PREPROCEDURE EVALUATION
02/26/2025  Juan Carlos Yoo Sr. is a 69 y.o., male.      Pre-op Assessment    I have reviewed the Patient Summary Reports.    I have reviewed the NPO Status.   I have reviewed the Medications.     Review of Systems  Anesthesia Hx:  No problems with previous Anesthesia             Denies Family Hx of Anesthesia complications.    Denies Personal Hx of Anesthesia complications.                    Social:  Non-Smoker       Cardiovascular:     Hypertension, well controlled    Dysrhythmias atrial fibrillation         ECG has been reviewed.                            Pulmonary:      Shortness of breath   Recent rhinovirus                Renal/:  Chronic Renal Disease, CKD                Hepatic/GI:      Liver Disease,  Cirrhosis etoh             Neurological:  Neurology Normal                                      Endocrine:  Endocrine Normal              Lab Results   Component Value Date    WBC 6.44 02/26/2025    HGB 9.8 (L) 02/26/2025    HCT 29.8 (L) 02/26/2025    MCV 91 02/26/2025    PLT 63 (L) 02/26/2025      CMP  Sodium   Date Value Ref Range Status   02/26/2025 135 (L) 136 - 145 mmol/L Final     Potassium   Date Value Ref Range Status   02/26/2025 2.9 (L) 3.5 - 5.1 mmol/L Final     Chloride   Date Value Ref Range Status   02/26/2025 98 95 - 110 mmol/L Final     CO2   Date Value Ref Range Status   02/26/2025 29 23 - 29 mmol/L Final     Glucose   Date Value Ref Range Status   02/26/2025 101 70 - 110 mg/dL Final     BUN   Date Value Ref Range Status   02/26/2025 21 8 - 23 mg/dL Final     Creatinine   Date Value Ref Range Status   02/26/2025 1.9 (H) 0.5 - 1.4 mg/dL Final     Calcium   Date Value Ref Range Status   02/26/2025 8.0 (L) 8.7 - 10.5 mg/dL Final     Total Protein   Date Value Ref Range Status   02/26/2025 6.2 6.0 - 8.4 g/dL Final     Albumin   Date Value Ref Range Status   02/26/2025 1.9 (L) 3.5 - 5.2  g/dL Final     Total Bilirubin   Date Value Ref Range Status   02/26/2025 3.2 (H) 0.1 - 1.0 mg/dL Final     Comment:     For infants and newborns, interpretation of results should be based  on gestational age, weight and in agreement with clinical  observations.    Premature Infant recommended reference ranges:  Up to 24 hours.............<8.0 mg/dL  Up to 48 hours............<12.0 mg/dL  3-5 days..................<15.0 mg/dL  6-29 days.................<15.0 mg/dL       Alkaline Phosphatase   Date Value Ref Range Status   02/26/2025 70 55 - 135 U/L Final     AST   Date Value Ref Range Status   02/26/2025 31 10 - 40 U/L Final     ALT   Date Value Ref Range Status   02/26/2025 12 10 - 44 U/L Final     Anion Gap   Date Value Ref Range Status   02/26/2025 8 8 - 16 mmol/L Final     eGFR   Date Value Ref Range Status   02/26/2025 37.2 (A) >60 mL/min/1.73 m^2 Final        Physical Exam  General: Well nourished    Airway:  Mallampati: III / II  Mouth Opening: Normal  TM Distance: Normal  Tongue: Normal  Neck ROM: Normal ROM    Dental:  Partial Dentures    Chest/Lungs:  Clear to auscultation    Heart:  Rate: Normal  Rhythm: Regular Rhythm  Sounds: Normal        Anesthesia Plan  Type of Anesthesia, risks & benefits discussed:    Anesthesia Type: MAC, Gen Supraglottic Airway  Intra-op Monitoring Plan: Standard ASA Monitors  Post Op Pain Control Plan: multimodal analgesia  Induction:  IV  Airway Plan: Direct  Informed Consent: Informed consent signed with the Patient and all parties understand the risks and agree with anesthesia plan.  All questions answered. Patient consented to blood products? No  ASA Score: 4  Day of Surgery Review of History & Physical: I have interviewed and examined the patient. I have reviewed the patient's H&P dated: There are no significant changes.     Ready For Surgery From Anesthesia Perspective.     .

## 2025-02-26 NOTE — PT/OT/SLP DISCHARGE
"Occupational Therapy Discharge Summary    Juan Carlos Yoo Sr.  MRN: [id number]   Principal Problem: Rhinovirus infection      Patient Discharged from acute Occupational Therapy on 2/26/25.  Please refer to prior OT note dated 2/25/25 for functional status.    Time In: 0858  Time Out: 0907 1TA    Assessment:      Patient has not met goals. Patient declining further occupational therapy treatment. "I've gotten up about twenty times to the bathroom over night." "I'm just tired of the hospitals, rehabs.."    Objective:    Patient educated on completion of bilateral upper extremities home exercise program during day to prevent joint stiffness and muscle atrophy. Patient reported he willing be discharging home with son who lives about 80 feet next door. Son and grandchildren check on patient throughout day as needed. Occupational therapy recommended use of shower chair upon d/c home to prevent falls and for fatigue during bathing; verbalized understanding.       GOALS:   Multidisciplinary Problems       Occupational Therapy Goals          Problem: Occupational Therapy    Goal Priority Disciplines Outcome Interventions   Occupational Therapy Goal     OT, PT/OT Unable to Meet    Description: Goals to be met by: 3/7/2025     Patient will increase functional independence with ADLs by performing:    Toileting from toilet with Set-up Assistance for hygiene and clothing management. Not Met   Sitting at edge of bed x15 minutes with Modified [place]. Not Met   Supine to sit with Modified [place]. Not Met   Toilet transfer to toilet with Modified [place]. Not Met   Increased functional strength to WFL for increased efficiency completing ADLs. Not Met                          Reasons for Discontinuation of Therapy Services  Patient declines to continue care.      Plan:     Patient Discharged to: Home with Home Health Service    2/26/2025    "

## 2025-02-26 NOTE — PLAN OF CARE
POC reviewed with pt, VSS, IVF's infusing per MD orders without complications. IV site clean/Dry/Intact. Pt denies pain/needs at this time,CB in reach, bed in lowest position, lighting adjusted to pt's preference will continue to monitor.   Problem: Adult Inpatient Plan of Care  Goal: Plan of Care Review  Outcome: Progressing  Goal: Patient-Specific Goal (Individualized)  Outcome: Progressing  Goal: Absence of Hospital-Acquired Illness or Injury  Outcome: Progressing  Goal: Optimal Comfort and Wellbeing  Outcome: Progressing  Goal: Readiness for Transition of Care  Outcome: Progressing     Problem: Acute Kidney Injury/Impairment  Goal: Fluid and Electrolyte Balance  Outcome: Progressing  Goal: Improved Oral Intake  Outcome: Progressing  Goal: Effective Renal Function  Outcome: Progressing     Problem: Infection  Goal: Absence of Infection Signs and Symptoms  Outcome: Progressing     Problem: Wound  Goal: Optimal Coping  Outcome: Progressing  Goal: Optimal Functional Ability  Outcome: Progressing  Goal: Absence of Infection Signs and Symptoms  Outcome: Progressing  Goal: Improved Oral Intake  Outcome: Progressing  Goal: Optimal Pain Control and Function  Outcome: Progressing  Goal: Skin Health and Integrity  Outcome: Progressing  Goal: Optimal Wound Healing  Outcome: Progressing

## 2025-02-26 NOTE — ASSESSMENT & PLAN NOTE
Patient's most recent potassium results are listed below.   Recent Labs     02/24/25  0522 02/25/25  0516 02/26/25  0529   K 3.2* 3.5 2.9*       Plan  - Replete potassium per protocol  - Monitor potassium Daily  - Patient's hypokalemia is stable  - Nephrology following.  Appreciate recs.

## 2025-02-26 NOTE — SUBJECTIVE & OBJECTIVE
Past Medical History:   Diagnosis Date    Atrial fibrillation     Bulging disc     Cirrhosis     Colon polyp 2017    Rpt 5 yrs    EV (esophageal varices)     Hypertension 2023    Renal disorder     Skin cancer 2017    Thrombocytopenia        Past Surgical History:   Procedure Laterality Date    CATHETERIZATION OF BOTH LEFT AND RIGHT HEART N/A 2023    Procedure: CATHETERIZATION, HEART, BOTH LEFT AND RIGHT;  Surgeon: Flo Malone MD;  Location: [facility] CATH LAB;  Service: Cardiology;  Laterality: N/A;  low bleeding risk 1.0%    CHOLECYSTECTOMY      COLONOSCOPY      COLONOSCOPY N/A 2017    ta rpt     CORONARY ANGIOGRAPHY N/A 2023    Procedure: ANGIOGRAM, CORONARY ARTERY;  Surgeon: Flo Malone MD;  Location: [facility] CATH LAB;  Service: Cardiology;  Laterality: N/A;    HERNIA REPAIR      SKIN BIOPSY  2017    TONSILLECTOMY      UPPER GASTROINTESTINAL ENDOSCOPY      EV    UPPER GASTROINTESTINAL ENDOSCOPY  2016    VASECTOMY         Review of patient's allergies indicates:  No Known Allergies    No current facility-administered medications on file prior to encounter.     Current Outpatient Medications on File Prior to Encounter   Medication Sig    famotidine (PEPCID) 20 MG tablet TAKE 1 TABLET BY MOUTH EVERY OTHER DAY    furosemide (LASIX) 80 MG tablet Take 80 mg by mouth every Tuesday, Thursday, Saturday, .    lactulose (CHRONULAC) 20 gram/30 mL Soln Take 45 mLs (30 g total) by mouth 3 (three) times daily. TITRATE TO 3-4 BOWEL MOVEMENTS DAILY.    metoprolol tartrate (LOPRESSOR) 25 MG tablet TAKE 0.5 TABLETS BY MOUTH 2 TIMES DAILY.    midodrine (PROAMATINE) 5 MG Tab Take 3 tablets (15 mg total) by mouth every 8 (eight) hours.    potassium chloride (K-TAB) 20 mEq SMARTSI Tablet(s) By Mouth    tamsulosin (FLOMAX) 0.4 mg Cap Take by mouth.    tuberculin,purif.prot.deriv. (TUBERSOL IDRM) Inject 0.1 mLs into the skin.     Family History       Problem Relation (Age of Onset)     Cancer Maternal Uncle    Heart disease Maternal Uncle    Hyperlipidemia Brother    Ovarian cancer Sister          Tobacco Use    Smoking status: Never     Passive exposure: Never    Smokeless tobacco: Never   Substance and Sexual Activity    Alcohol use: Not Currently     Alcohol/week: 0.0 standard drinks of alcohol    Drug use: No    Sexual activity: Not Currently     Review of Systems   Constitutional:  Positive for activity change, appetite change and fatigue. Negative for chills and fever.   HENT:  Negative for ear pain, mouth sores, nosebleeds and sore throat.    Eyes:  Negative for visual disturbance.   Respiratory:  Positive for cough, shortness of breath and wheezing. Negative for chest tightness and stridor.    Cardiovascular:  Positive for leg swelling. Negative for chest pain and palpitations.   Gastrointestinal:  Negative for abdominal distention, abdominal pain, blood in stool, constipation, diarrhea, nausea and vomiting.   Endocrine: Negative for polyphagia.   Genitourinary:  Positive for difficulty urinating. Negative for dysuria, flank pain and frequency.   Musculoskeletal:  Positive for gait problem. Negative for myalgias.   Skin:  Positive for color change and rash.   Neurological:  Positive for weakness. Negative for dizziness, tremors, seizures, syncope, facial asymmetry, speech difficulty and headaches.   Hematological:  Negative for adenopathy.   Psychiatric/Behavioral:  Positive for confusion. Negative for agitation and hallucinations. The patient is not nervous/anxious.      Objective:     Vital Signs (Most Recent):  Temp: 97.8 °F (36.6 °C) (02/26/25 0841)  Pulse: 94 (02/26/25 0841)  Resp: 20 (02/26/25 0841)  BP: (!) 112/53 (02/26/25 0841)  SpO2: 98 % (02/26/25 0841) Vital Signs (24h Range):  Temp:  [97.6 °F (36.4 °C)-98.2 °F (36.8 °C)] 97.8 °F (36.6 °C)  Pulse:  [] 94  Resp:  [16-20] 20  SpO2:  [95 %-99 %] 98 %  BP: (106-121)/(53-65) 112/53     Weight: 131.5 kg (290 lb)  Body mass  index is 38.26 kg/m².     Physical Exam  Vitals and nursing note reviewed.   Constitutional:       General: He is not in acute distress.     Appearance: He is obese. He is ill-appearing. He is not diaphoretic.   HENT:      Head: Normocephalic and atraumatic.      Right Ear: External ear normal.      Left Ear: External ear normal.      Nose: Nose normal. No rhinorrhea.      Mouth/Throat:      Mouth: Mucous membranes are moist.      Pharynx: No oropharyngeal exudate or posterior oropharyngeal erythema.   Eyes:      General: Scleral icterus present.      Extraocular Movements: Extraocular movements intact.   Neck:      Vascular: No carotid bruit.   Cardiovascular:      Rate and Rhythm: Normal rate. Rhythm irregular.      Heart sounds: Normal heart sounds. No murmur heard.     No friction rub. No gallop.   Pulmonary:      Effort: No respiratory distress.      Breath sounds: No stridor. Wheezing and rhonchi present. No rales.   Chest:      Chest wall: No tenderness.   Abdominal:      General: There is no distension.      Palpations: There is no mass.      Tenderness: There is no abdominal tenderness. There is no right CVA tenderness, left CVA tenderness, guarding or rebound.      Hernia: No hernia is present.   Musculoskeletal:         General: No swelling, tenderness or deformity.      Cervical back: No rigidity.      Right lower leg: Edema present.      Left lower leg: Edema present.   Lymphadenopathy:      Cervical: No cervical adenopathy.   Skin:     General: Skin is warm.      Capillary Refill: Capillary refill takes less than 2 seconds.      Coloration: Skin is jaundiced.      Findings: Erythema and rash present.          Neurological:      Mental Status: He is alert.      Cranial Nerves: No cranial nerve deficit.      Sensory: No sensory deficit.      Motor: Weakness present.      Coordination: Coordination normal.   Psychiatric:         Mood and Affect: Mood normal.         Behavior: Behavior normal.          Thought Content: Thought content normal.         Judgment: Judgment normal.                Significant Labs: All pertinent labs within the past 24 hours have been reviewed.  Recent Lab Results         02/26/25  0529        Albumin 1.9       ALP 70       ALT 12       Anion Gap 8       AST 31       Baso # 0.04       Basophil % 0.6       BILIRUBIN TOTAL 3.2  Comment: For infants and newborns, interpretation of results should be based  on gestational age, weight and in agreement with clinical  observations.    Premature Infant recommended reference ranges:  Up to 24 hours.............<8.0 mg/dL  Up to 48 hours............<12.0 mg/dL  3-5 days..................<15.0 mg/dL  6-29 days.................<15.0 mg/dL         BUN 21       Calcium 8.0       Chloride 98       CO2 29       Creatinine 1.9       Differential Method Automated       eGFR 37.2       Eos # 0.1       Eos % 1.1       Glucose 101       Gran # (ANC) 4.2       Gran % 65.3       Hematocrit 29.8       Hemoglobin 9.8       Immature Grans (Abs) 0.04  Comment: Mild elevation in immature granulocytes is non specific and   can be seen in a variety of conditions including stress response,   acute inflammation, trauma and pregnancy. Correlation with other   laboratory and clinical findings is essential.         Immature Granulocytes 0.6       Lymph # 0.7       Lymph % 11.0       MCH 29.8       MCHC 32.9       MCV 91       Mono # 1.4       Mono % 21.4       MPV 12.0       nRBC 0       Platelet Count 63       Potassium 2.9       PROTEIN TOTAL 6.2       RBC 3.29       RDW 19.1       Sodium 135       WBC 6.44               Significant Imaging: I have reviewed all pertinent imaging results/findings within the past 24 hours.   Include Location In Plan?: No Detail Level: Zone Additional Note: Reassured benign in nature. Additional Notes (Optional): Reassured benign in nature Detail Level: Detailed

## 2025-02-26 NOTE — PLAN OF CARE
Plan of care reviewed with patient. Peripheral IV in place and saline locked. PIV site due to change. Educated and encouraged pt to allow RN to change PIV site. PIV site with some redness; no swelling noted. Pt denies any pain or tenderness at site. Pt continued to refuse new PIV and requesting PIV site to be changed today. IV status passed on to dayshift nurse in bedside shift report. Pt tolerated meds well. VSS. NADN. Pt ambulating self to bathroom. Pt reports BM and multiple urine occurences this shift. Pt free from falls and injury. Questions and concerns addressed. Pt belongings and call bell within reach.   Problem: Adult Inpatient Plan of Care  Goal: Plan of Care Review  Outcome: Progressing  Goal: Patient-Specific Goal (Individualized)  Outcome: Progressing  Goal: Absence of Hospital-Acquired Illness or Injury  Outcome: Progressing  Goal: Optimal Comfort and Wellbeing  Outcome: Progressing  Goal: Readiness for Transition of Care  Outcome: Progressing     Problem: Acute Kidney Injury/Impairment  Goal: Fluid and Electrolyte Balance  Outcome: Progressing  Goal: Improved Oral Intake  Outcome: Progressing  Goal: Effective Renal Function  Outcome: Progressing     Problem: Infection  Goal: Absence of Infection Signs and Symptoms  Outcome: Progressing     Problem: Wound  Goal: Optimal Coping  Outcome: Progressing  Goal: Optimal Functional Ability  Outcome: Progressing  Goal: Absence of Infection Signs and Symptoms  Outcome: Progressing  Goal: Improved Oral Intake  Outcome: Progressing  Goal: Optimal Pain Control and Function  Outcome: Progressing  Goal: Skin Health and Integrity  Outcome: Progressing  Goal: Optimal Wound Healing  Outcome: Progressing

## 2025-02-26 NOTE — ASSESSMENT & PLAN NOTE
2/23 FM:  Starting abx, get US.    2/24:  Increased induration and erythema noted.  Ultrasound pending.  Patient afebrile without leukocytosis.  Continue IV antibiotics.      2/25:  Ultrasound obtained yesterday reveals 12.6 x 6.1 x 3.0 cm complex fluid collection to patient's medial right thigh.  General surgery consulted.  Appreciate recs.  Patient afebrile without leukocytosis.  Continue current IV antibiotic treatment.    2/26:  Patient remains afebrile without leukocytosis.  Continue antibiotic treatment.  General surgery consulted.  Appreciate recs.

## 2025-02-26 NOTE — PROGRESS NOTES
[facility] Medicine  Progress Note    Patient Name: Juan Carlos Yoo Sr.  MRN: [id number]  Patient Class: IP- Inpatient   Admission Date: 2/21/2025  Length of Stay: 4 days  Attending Physician: Joao Villegas III, MD  Primary Care Provider: Joao Villegas III, MD        Subjective     Principal Problem:Rhinovirus infection        HPI:  ED HPI:  Juan Carlos Yoo Sr. is a 69 y.o. male with PMHX of alcoholic cirrhosis, HCC, thrombocytopenia, Afib (not on anticoagulation due to thrombocytopenia), ESRD on HD M/W/F who presents to the emergency department C/O SOB.     Patient was recently  admitted to [facility] due to severe anasarca JAE, evaluated for hepatorenal syndrome and had 40 day hospitalization.  Patient released from rehab about a week ago.  Has endorse increasing shortness of breath for the past 3 days.  States at night it is worse when trying to sleep.  Patient lays on his side and uses 3 pillows to prop his head up.  Patient went to urgent care today because he felt like tonight was going to be another bad night and they sent him to ER.  He denies fever.  States swelling is about baseline.  Had typical dialysis session today.  He reports they were trying to take off 3 L but is unsure of how much volume was removed.  He makes urine but states it is difficult to urinate due to anasarca.  He states compliance to low-sodium diet and fluid restriction since recent discharge.  Has not had alcohol in over 7 months.    IM HPI:  Patient's chart reviewed and above history noted.  Patient's been admitted for acute on chronic dyspnea and was diagnosed with rhinovirus.  Patient had been having increasing dyspnea weakness coughing with clear sputum production over the last 36 hours and ultimately presented with worsening shortness of breath.  He has been placed on oxygen nasal cannula states he is feeling much better.  Patient has a mild cough and wheezing on exam.  Patient has a complex  medical history that is has been reviewed he seems slightly hypervolemic we will notify his nephrologist and he will likely need his routine hemodialysis if he ends up staying through Monday.  Patient is afebrile labs noted potassium low we will replete and maybe a good candidate for Aldactone.    Overview/Hospital Course:  2/23 FM:  Patient complaining of a area of redness and pain on the right thigh above the knee.  This started yesterday.  Patient's exam consistent with a area of focal cellulitis and induration.  Ordering ultrasound and starting antibiotics.  Patient's respiratory complaints are much improved and getting back to his baseline.  Patient's nephrologist aware he is in house and may need hemodialysis in a.m..    2/24 DL:  Patient doing well this morning.  He is sitting up in bed in his awake, alert, oriented.  Patient reports respiratory status improved.  Patient now off of supplemental O2.  Patient reports continued tenderness to area of redness to right inner thigh.  Redness and induration have spread past previously marked borders.  Patient is scheduled for ultrasound today.  Continue IV antibiotics.  Patient is scheduled for hemodialysis today.  Nephrology following.    2/25 DL:  Patient doing well this morning.  He is lying in bed in his awake, alert, oriented.  Respiratory status with continued improvement.  Labs and vital signs stable.  Ultrasound right thigh obtained yesterday reveals 12.6 x 6.1 x 3.0 cm complex fluid collection to the medial right thigh.  Venous ultrasound negative for DVT.  General surgery consulted.  Continue current IV antibiotics.  Renal function stable.  Nephrology following.    2/26 DL:  Patient doing well this morning.  He is lying in bed in his awake, alert, oriented.  Respiratory status at baseline.  Vital signs stable.  Patient afebrile without leukocytosis.  Continue current antibiotic treatment for abscess to right thigh.  General surgery consulted.  Appreciate  recs.  Hypokalemia and hypomagnesemia noted this morning.  We will replenish.  Nephrology following.  Appreciate recs.    Past Medical History:   Diagnosis Date    Atrial fibrillation     Bulging disc     Cirrhosis     Colon polyp 2017    Rpt 5 yrs    EV (esophageal varices)     Hypertension 2023    Renal disorder     Skin cancer 2017    Thrombocytopenia        Past Surgical History:   Procedure Laterality Date    CATHETERIZATION OF BOTH LEFT AND RIGHT HEART N/A 2023    Procedure: CATHETERIZATION, HEART, BOTH LEFT AND RIGHT;  Surgeon: Flo Malone MD;  Location: [facility] CATH LAB;  Service: Cardiology;  Laterality: N/A;  low bleeding risk 1.0%    CHOLECYSTECTOMY      COLONOSCOPY      COLONOSCOPY N/A 2017    ta rpt     CORONARY ANGIOGRAPHY N/A 2023    Procedure: ANGIOGRAM, CORONARY ARTERY;  Surgeon: Flo Malone MD;  Location: [facility] CATH LAB;  Service: Cardiology;  Laterality: N/A;    HERNIA REPAIR      SKIN BIOPSY  2017    TONSILLECTOMY      UPPER GASTROINTESTINAL ENDOSCOPY      EV    UPPER GASTROINTESTINAL ENDOSCOPY      VASECTOMY         Review of patient's allergies indicates:  No Known Allergies    No current facility-administered medications on file prior to encounter.     Current Outpatient Medications on File Prior to Encounter   Medication Sig    famotidine (PEPCID) 20 MG tablet TAKE 1 TABLET BY MOUTH EVERY OTHER DAY    furosemide (LASIX) 80 MG tablet Take 80 mg by mouth every Tuesday, Thursday, Saturday, .    lactulose (CHRONULAC) 20 gram/30 mL Soln Take 45 mLs (30 g total) by mouth 3 (three) times daily. TITRATE TO 3-4 BOWEL MOVEMENTS DAILY.    metoprolol tartrate (LOPRESSOR) 25 MG tablet TAKE 0.5 TABLETS BY MOUTH 2 TIMES DAILY.    midodrine (PROAMATINE) 5 MG Tab Take 3 tablets (15 mg total) by mouth every 8 (eight) hours.    potassium chloride (K-TAB) 20 mEq SMARTSI Tablet(s) By Mouth    tamsulosin (FLOMAX) 0.4 mg Cap Take by mouth.     tuberculin,purif.prot.deriv. (TUBERSOL IDRM) Inject 0.1 mLs into the skin.     Family History       Problem Relation (Age of Onset)    Cancer Maternal Uncle    Heart disease Maternal Uncle    Hyperlipidemia Brother    Ovarian cancer Sister          Tobacco Use    Smoking status: Never     Passive exposure: Never    Smokeless tobacco: Never   Substance and Sexual Activity    Alcohol use: Not Currently     Alcohol/week: 0.0 standard drinks of alcohol    Drug use: No    Sexual activity: Not Currently     Review of Systems   Constitutional:  Positive for activity change, appetite change and fatigue. Negative for chills and fever.   HENT:  Negative for ear pain, mouth sores, nosebleeds and sore throat.    Eyes:  Negative for visual disturbance.   Respiratory:  Positive for cough, shortness of breath and wheezing. Negative for chest tightness and stridor.    Cardiovascular:  Positive for leg swelling. Negative for chest pain and palpitations.   Gastrointestinal:  Negative for abdominal distention, abdominal pain, blood in stool, constipation, diarrhea, nausea and vomiting.   Endocrine: Negative for polyphagia.   Genitourinary:  Positive for difficulty urinating. Negative for dysuria, flank pain and frequency.   Musculoskeletal:  Positive for gait problem. Negative for myalgias.   Skin:  Positive for color change and rash.   Neurological:  Positive for weakness. Negative for dizziness, tremors, seizures, syncope, facial asymmetry, speech difficulty and headaches.   Hematological:  Negative for adenopathy.   Psychiatric/Behavioral:  Positive for confusion. Negative for agitation and hallucinations. The patient is not nervous/anxious.      Objective:     Vital Signs (Most Recent):  Temp: 97.8 °F (36.6 °C) (02/26/25 0841)  Pulse: 94 (02/26/25 0841)  Resp: 20 (02/26/25 0841)  BP: (!) 112/53 (02/26/25 0841)  SpO2: 98 % (02/26/25 0841) Vital Signs (24h Range):  Temp:  [97.6 °F (36.4 °C)-98.2 °F (36.8 °C)] 97.8 °F (36.6  °C)  Pulse:  [] 94  Resp:  [16-20] 20  SpO2:  [95 %-99 %] 98 %  BP: (106-121)/(53-65) 112/53     Weight: 131.5 kg (290 lb)  Body mass index is 38.26 kg/m².     Physical Exam  Vitals and nursing note reviewed.   Constitutional:       General: He is not in acute distress.     Appearance: He is obese. He is ill-appearing. He is not diaphoretic.   HENT:      Head: Normocephalic and atraumatic.      Right Ear: External ear normal.      Left Ear: External ear normal.      Nose: Nose normal. No rhinorrhea.      Mouth/Throat:      Mouth: Mucous membranes are moist.      Pharynx: No oropharyngeal exudate or posterior oropharyngeal erythema.   Eyes:      General: Scleral icterus present.      Extraocular Movements: Extraocular movements intact.   Neck:      Vascular: No carotid bruit.   Cardiovascular:      Rate and Rhythm: Normal rate. Rhythm irregular.      Heart sounds: Normal heart sounds. No murmur heard.     No friction rub. No gallop.   Pulmonary:      Effort: No respiratory distress.      Breath sounds: No stridor. Wheezing and rhonchi present. No rales.   Chest:      Chest wall: No tenderness.   Abdominal:      General: There is no distension.      Palpations: There is no mass.      Tenderness: There is no abdominal tenderness. There is no right CVA tenderness, left CVA tenderness, guarding or rebound.      Hernia: No hernia is present.   Musculoskeletal:         General: No swelling, tenderness or deformity.      Cervical back: No rigidity.      Right lower leg: Edema present.      Left lower leg: Edema present.   Lymphadenopathy:      Cervical: No cervical adenopathy.   Skin:     General: Skin is warm.      Capillary Refill: Capillary refill takes less than 2 seconds.      Coloration: Skin is jaundiced.      Findings: Erythema and rash present.          Neurological:      Mental Status: He is alert.      Cranial Nerves: No cranial nerve deficit.      Sensory: No sensory deficit.      Motor: Weakness present.       Coordination: Coordination normal.   Psychiatric:         Mood and Affect: Mood normal.         Behavior: Behavior normal.         Thought Content: Thought content normal.         Judgment: Judgment normal.                Significant Labs: All pertinent labs within the past 24 hours have been reviewed.  Recent Lab Results         02/26/25  0529        Albumin 1.9       ALP 70       ALT 12       Anion Gap 8       AST 31       Baso # 0.04       Basophil % 0.6       BILIRUBIN TOTAL 3.2  Comment: For infants and newborns, interpretation of results should be based  on gestational age, weight and in agreement with clinical  observations.    Premature Infant recommended reference ranges:  Up to 24 hours.............<8.0 mg/dL  Up to 48 hours............<12.0 mg/dL  3-5 days..................<15.0 mg/dL  6-29 days.................<15.0 mg/dL         BUN 21       Calcium 8.0       Chloride 98       CO2 29       Creatinine 1.9       Differential Method Automated       eGFR 37.2       Eos # 0.1       Eos % 1.1       Glucose 101       Gran # (ANC) 4.2       Gran % 65.3       Hematocrit 29.8       Hemoglobin 9.8       Immature Grans (Abs) 0.04  Comment: Mild elevation in immature granulocytes is non specific and   can be seen in a variety of conditions including stress response,   acute inflammation, trauma and pregnancy. Correlation with other   laboratory and clinical findings is essential.         Immature Granulocytes 0.6       Lymph # 0.7       Lymph % 11.0       MCH 29.8       MCHC 32.9       MCV 91       Mono # 1.4       Mono % 21.4       MPV 12.0       nRBC 0       Platelet Count 63       Potassium 2.9       PROTEIN TOTAL 6.2       RBC 3.29       RDW 19.1       Sodium 135       WBC 6.44               Significant Imaging: I have reviewed all pertinent imaging results/findings within the past 24 hours.    Assessment and Plan     * Rhinovirus infection  Conservative care, + now with O2 needs.    2/24:  Continue  "conservative care.  Symptoms improving.  Patient with stable O2 sats on room air.      Hypomagnesemia  Patient has Abnormal Magnesium: hypomagnesemia. Will continue to monitor electrolytes closely. Will replace the affected electrolytes and repeat labs to be done after interventions completed. The patient's magnesium results have been reviewed and are listed below.  No results for input(s): "MG" in the last 24 hours.       Cellulitis of right lower extremity  2/23 FM:  Starting abx, get US.    2/24:  Increased induration and erythema noted.  Ultrasound pending.  Patient afebrile without leukocytosis.  Continue IV antibiotics.      2/25:  Ultrasound obtained yesterday reveals 12.6 x 6.1 x 3.0 cm complex fluid collection to patient's medial right thigh.  General surgery consulted.  Appreciate recs.  Patient afebrile without leukocytosis.  Continue current IV antibiotic treatment.    2/26:  Patient remains afebrile without leukocytosis.  Continue antibiotic treatment.  General surgery consulted.  Appreciate recs.      Hypokalemia  Patient's most recent potassium results are listed below.   Recent Labs     02/24/25  0522 02/25/25  0516 02/26/25  0529   K 3.2* 3.5 2.9*       Plan  - Replete potassium per protocol  - Monitor potassium Daily  - Patient's hypokalemia is stable  - Nephrology following.  Appreciate recs.    Morbid obesity  Body mass index is 38.26 kg/m². Morbid obesity complicates all aspects of disease management from diagnostic modalities to treatment. Weight loss encouraged and health benefits explained to patient.         Moderate protein-calorie malnutrition  Nutrition consulted. Most recent weight and BMI monitored-     Measurements:  Wt Readings from Last 1 Encounters:   02/21/25 131.5 kg (290 lb)   Body mass index is 38.26 kg/m².    Patient has been screened and assessed by RD.    Malnutrition Type:  Context:    Level:      Malnutrition Characteristic Summary:       Interventions/Recommendations " (treatment strategy):         Hypotension  Blood pressure stable.  Continue monitoring.      Hepatic encephalopathy  Continue lactulose.      CKD (chronic kidney disease) stage 4, GFR 15-29 ml/min  Creatine stable for now. BMP reviewed- noted Estimated Creatinine Clearance: 50.7 mL/min (A) (based on SCr of 1.9 mg/dL (H)). according to latest data. Based on current GFR, CKD stage is stage 5 - GFR < 15.  Monitor UOP and serial BMP and adjust therapy as needed. Renally dose meds. Avoid nephrotoxic medications and procedures.    Per renal, currently on MWF HD, ? Still needs this?    Suspected Hepatorenal syndrome  Slightly hypervolemic today, much improved from past care.      Longstanding persistent atrial fibrillation  Patient has long standing persistent (>12 months) atrial fibrillation. Patient is currently in atrial fibrillation. [id number] Score: 1. The patients heart rate in the last 24 hours is as follows:  Pulse  Min: 91  Max: 110     Antiarrhythmics  metoprolol tartrate (LOPRESSOR) tablet 25 mg, 2 times daily, Oral    Anticoagulants       Plan  - Replete lytes with a goal of K>4, Mg >2  - Patient is anticoagulated, see medications listed above.  - Patient's afib is currently controlled    Auto anticoagulated with CLD.        Coagulopathy  CLD      Cirrhosis  Patient with known Cirrhosis with Child's class C. Co-morbidities are present and inclusive of portal hypertension, hepatic encephalopathy, malnutrition, anemia/pancytopenia, and anticoagulation.  MELD-Na score calculated; MELD 3.0: 28 at 2/23/2025  5:15 AM  MELD-Na: 26 at 2/23/2025  5:15 AM  Calculated from:  Serum Creatinine: 1.8 mg/dL at 2/23/2025  5:15 AM  Serum Sodium: 133 mmol/L at 2/23/2025  5:15 AM  Total Bilirubin: 3.9 mg/dL at 2/23/2025  5:15 AM  Serum Albumin: 2.1 g/dL at 2/23/2025  5:15 AM  INR(ratio): 1.9 at 2/21/2025  6:42 PM  Age at listing (hypothetical): 69 years  Sex: Male at 2/23/2025  5:15 AM      Continue chronic meds. Etiology likely  ETOH. Will avoid any hepatotoxic meds, and monitor CBC/CMP/INR for synthetic function.       VTE Risk Mitigation (From admission, onward)           Ordered     Place sequential compression device  Until discontinued         02/22/25 1002     IP VTE HIGH RISK PATIENT  Once         02/21/25 2204     Place ANDREW hose  Until discontinued         02/21/25 2204                    Discharge Planning   AUTUMN:      Code Status: Full Code   Medical Readiness for Discharge Date:   Discharge Plan A: Home                Please place Justification for DME        Pilar Gates NP  Department of Hospital Medicine   [facility] S[facility]

## 2025-02-26 NOTE — ASSESSMENT & PLAN NOTE
Patient has long standing persistent (>12 months) atrial fibrillation. Patient is currently in atrial fibrillation. [id number] Score: 1. The patients heart rate in the last 24 hours is as follows:  Pulse  Min: 91  Max: 110     Antiarrhythmics  metoprolol tartrate (LOPRESSOR) tablet 25 mg, 2 times daily, Oral    Anticoagulants       Plan  - Replete lytes with a goal of K>4, Mg >2  - Patient is anticoagulated, see medications listed above.  - Patient's afib is currently controlled    Auto anticoagulated with CLD.

## 2025-02-26 NOTE — ASSESSMENT & PLAN NOTE
Creatine stable for now. BMP reviewed- noted Estimated Creatinine Clearance: 50.7 mL/min (A) (based on SCr of 1.9 mg/dL (H)). according to latest data. Based on current GFR, CKD stage is stage 5 - GFR < 15.  Monitor UOP and serial BMP and adjust therapy as needed. Renally dose meds. Avoid nephrotoxic medications and procedures.    Per renal, currently on MWF HD, ? Still needs this?

## 2025-02-27 ENCOUNTER — ANESTHESIA (OUTPATIENT)
Dept: SURGERY | Facility: HOSPITAL | Age: 70
End: 2025-02-27
Payer: COMMERCIAL

## 2025-02-27 LAB
ALBUMIN SERPL BCP-MCNC: 2 G/DL (ref 3.5–5.2)
ALP SERPL-CCNC: 74 U/L (ref 55–135)
ALT SERPL W/O P-5'-P-CCNC: 11 U/L (ref 10–44)
ANION GAP SERPL CALC-SCNC: 11 MMOL/L (ref 8–16)
AST SERPL-CCNC: 32 U/L (ref 10–40)
BASOPHILS # BLD AUTO: 0.06 K/UL (ref 0–0.2)
BASOPHILS NFR BLD: 0.9 % (ref 0–1.9)
BILIRUB SERPL-MCNC: 3.7 MG/DL (ref 0.1–1)
BUN SERPL-MCNC: 23 MG/DL (ref 8–23)
CALCIUM SERPL-MCNC: 8.4 MG/DL (ref 8.7–10.5)
CHLORIDE SERPL-SCNC: 94 MMOL/L (ref 95–110)
CO2 SERPL-SCNC: 30 MMOL/L (ref 23–29)
CREAT SERPL-MCNC: 1.8 MG/DL (ref 0.5–1.4)
DIFFERENTIAL METHOD BLD: ABNORMAL
EOSINOPHIL # BLD AUTO: 0.1 K/UL (ref 0–0.5)
EOSINOPHIL NFR BLD: 0.9 % (ref 0–8)
ERYTHROCYTE [DISTWIDTH] IN BLOOD BY AUTOMATED COUNT: 19 % (ref 11.5–14.5)
EST. GFR  (NO RACE VARIABLE): 39.7 ML/MIN/1.73 M^2
GLUCOSE SERPL-MCNC: 95 MG/DL (ref 70–110)
GRAM STN SPEC: NORMAL
GRAM STN SPEC: NORMAL
HCT VFR BLD AUTO: 31.1 % (ref 40–54)
HGB BLD-MCNC: 10.4 G/DL (ref 14–18)
IMM GRANULOCYTES # BLD AUTO: 0.04 K/UL (ref 0–0.04)
IMM GRANULOCYTES NFR BLD AUTO: 0.6 % (ref 0–0.5)
LYMPHOCYTES # BLD AUTO: 0.8 K/UL (ref 1–4.8)
LYMPHOCYTES NFR BLD: 12.2 % (ref 18–48)
MAGNESIUM SERPL-MCNC: 1.8 MG/DL (ref 1.6–2.6)
MCH RBC QN AUTO: 29.7 PG (ref 27–31)
MCHC RBC AUTO-ENTMCNC: 33.4 G/DL (ref 32–36)
MCV RBC AUTO: 89 FL (ref 82–98)
MONOCYTES # BLD AUTO: 1.3 K/UL (ref 0.3–1)
MONOCYTES NFR BLD: 19.7 % (ref 4–15)
NEUTROPHILS # BLD AUTO: 4.2 K/UL (ref 1.8–7.7)
NEUTROPHILS NFR BLD: 65.7 % (ref 38–73)
NRBC BLD-RTO: 0 /100 WBC
PLATELET # BLD AUTO: 71 K/UL (ref 150–450)
PMV BLD AUTO: 11.6 FL (ref 9.2–12.9)
POTASSIUM SERPL-SCNC: 2.8 MMOL/L (ref 3.5–5.1)
PROT SERPL-MCNC: 6.5 G/DL (ref 6–8.4)
RBC # BLD AUTO: 3.5 M/UL (ref 4.6–6.2)
SODIUM SERPL-SCNC: 135 MMOL/L (ref 136–145)
WBC # BLD AUTO: 6.45 K/UL (ref 3.9–12.7)

## 2025-02-27 PROCEDURE — 99900035 HC TECH TIME PER 15 MIN (STAT)

## 2025-02-27 PROCEDURE — 63600175 PHARM REV CODE 636 W HCPCS

## 2025-02-27 PROCEDURE — 25000003 PHARM REV CODE 250: Performed by: INTERNAL MEDICINE

## 2025-02-27 PROCEDURE — 10140 I&D HMTMA SEROMA/FLUID COLLJ: CPT | Mod: ,,, | Performed by: STUDENT IN AN ORGANIZED HEALTH CARE EDUCATION/TRAINING PROGRAM

## 2025-02-27 PROCEDURE — 63600175 PHARM REV CODE 636 W HCPCS: Performed by: NURSE ANESTHETIST, CERTIFIED REGISTERED

## 2025-02-27 PROCEDURE — 80053 COMPREHEN METABOLIC PANEL: CPT | Performed by: INTERNAL MEDICINE

## 2025-02-27 PROCEDURE — 37000008 HC ANESTHESIA 1ST 15 MINUTES: Performed by: STUDENT IN AN ORGANIZED HEALTH CARE EDUCATION/TRAINING PROGRAM

## 2025-02-27 PROCEDURE — 25000003 PHARM REV CODE 250: Performed by: NURSE ANESTHETIST, CERTIFIED REGISTERED

## 2025-02-27 PROCEDURE — 87075 CULTR BACTERIA EXCEPT BLOOD: CPT | Performed by: STUDENT IN AN ORGANIZED HEALTH CARE EDUCATION/TRAINING PROGRAM

## 2025-02-27 PROCEDURE — 36000705 HC OR TIME LEV I EA ADD 15 MIN: Performed by: STUDENT IN AN ORGANIZED HEALTH CARE EDUCATION/TRAINING PROGRAM

## 2025-02-27 PROCEDURE — 25000003 PHARM REV CODE 250: Performed by: STUDENT IN AN ORGANIZED HEALTH CARE EDUCATION/TRAINING PROGRAM

## 2025-02-27 PROCEDURE — 94761 N-INVAS EAR/PLS OXIMETRY MLT: CPT

## 2025-02-27 PROCEDURE — 94640 AIRWAY INHALATION TREATMENT: CPT

## 2025-02-27 PROCEDURE — 36415 COLL VENOUS BLD VENIPUNCTURE: CPT | Performed by: INTERNAL MEDICINE

## 2025-02-27 PROCEDURE — 37000009 HC ANESTHESIA EA ADD 15 MINS: Performed by: STUDENT IN AN ORGANIZED HEALTH CARE EDUCATION/TRAINING PROGRAM

## 2025-02-27 PROCEDURE — 11000001 HC ACUTE MED/SURG PRIVATE ROOM

## 2025-02-27 PROCEDURE — C1729 CATH, DRAINAGE: HCPCS | Performed by: STUDENT IN AN ORGANIZED HEALTH CARE EDUCATION/TRAINING PROGRAM

## 2025-02-27 PROCEDURE — 85025 COMPLETE CBC W/AUTO DIFF WBC: CPT | Performed by: INTERNAL MEDICINE

## 2025-02-27 PROCEDURE — 36000704 HC OR TIME LEV I 1ST 15 MIN: Performed by: STUDENT IN AN ORGANIZED HEALTH CARE EDUCATION/TRAINING PROGRAM

## 2025-02-27 PROCEDURE — 99900031 HC PATIENT EDUCATION (STAT)

## 2025-02-27 PROCEDURE — 27000207 HC ISOLATION

## 2025-02-27 PROCEDURE — 0Y9C0ZZ DRAINAGE OF RIGHT UPPER LEG, OPEN APPROACH: ICD-10-PCS | Performed by: STUDENT IN AN ORGANIZED HEALTH CARE EDUCATION/TRAINING PROGRAM

## 2025-02-27 PROCEDURE — 83735 ASSAY OF MAGNESIUM: CPT | Performed by: INTERNAL MEDICINE

## 2025-02-27 PROCEDURE — 87205 SMEAR GRAM STAIN: CPT | Performed by: STUDENT IN AN ORGANIZED HEALTH CARE EDUCATION/TRAINING PROGRAM

## 2025-02-27 PROCEDURE — 25000242 PHARM REV CODE 250 ALT 637 W/ HCPCS: Performed by: STUDENT IN AN ORGANIZED HEALTH CARE EDUCATION/TRAINING PROGRAM

## 2025-02-27 PROCEDURE — 63600175 PHARM REV CODE 636 W HCPCS: Performed by: STUDENT IN AN ORGANIZED HEALTH CARE EDUCATION/TRAINING PROGRAM

## 2025-02-27 PROCEDURE — 87070 CULTURE OTHR SPECIMN AEROBIC: CPT | Performed by: STUDENT IN AN ORGANIZED HEALTH CARE EDUCATION/TRAINING PROGRAM

## 2025-02-27 RX ORDER — ONDANSETRON HYDROCHLORIDE 2 MG/ML
INJECTION, SOLUTION INTRAVENOUS
Status: DISCONTINUED | OUTPATIENT
Start: 2025-02-27 | End: 2025-02-27

## 2025-02-27 RX ORDER — MIDAZOLAM HYDROCHLORIDE 1 MG/ML
INJECTION INTRAMUSCULAR; INTRAVENOUS
Status: DISCONTINUED | OUTPATIENT
Start: 2025-02-27 | End: 2025-02-27

## 2025-02-27 RX ORDER — PROPOFOL 10 MG/ML
INJECTION, EMULSION INTRAVENOUS
Status: DISCONTINUED | OUTPATIENT
Start: 2025-02-27 | End: 2025-02-27

## 2025-02-27 RX ORDER — SODIUM CHLORIDE 9 MG/ML
INJECTION, SOLUTION INTRAVENOUS CONTINUOUS PRN
Status: DISCONTINUED | OUTPATIENT
Start: 2025-02-27 | End: 2025-02-27

## 2025-02-27 RX ORDER — POTASSIUM CHLORIDE 20 MEQ/1
60 TABLET, EXTENDED RELEASE ORAL 3 TIMES DAILY
Status: DISCONTINUED | OUTPATIENT
Start: 2025-02-27 | End: 2025-03-01

## 2025-02-27 RX ORDER — FENTANYL CITRATE 50 UG/ML
INJECTION, SOLUTION INTRAMUSCULAR; INTRAVENOUS
Status: DISCONTINUED | OUTPATIENT
Start: 2025-02-27 | End: 2025-02-27

## 2025-02-27 RX ORDER — POTASSIUM CHLORIDE 7.45 MG/ML
10 INJECTION INTRAVENOUS
Status: COMPLETED | OUTPATIENT
Start: 2025-02-27 | End: 2025-02-27

## 2025-02-27 RX ADMIN — POTASSIUM CHLORIDE 60 MEQ: 1500 TABLET, EXTENDED RELEASE ORAL at 03:02

## 2025-02-27 RX ADMIN — POTASSIUM CHLORIDE 60 MEQ: 1500 TABLET, EXTENDED RELEASE ORAL at 11:02

## 2025-02-27 RX ADMIN — POTASSIUM CHLORIDE 10 MEQ: 7.46 INJECTION, SOLUTION INTRAVENOUS at 01:02

## 2025-02-27 RX ADMIN — MAGNESIUM 64 MG (MAGNESIUM CHLORIDE) TABLET,DELAYED RELEASE 128 MG: at 09:02

## 2025-02-27 RX ADMIN — TAMSULOSIN HYDROCHLORIDE 0.4 MG: 0.4 CAPSULE ORAL at 09:02

## 2025-02-27 RX ADMIN — SODIUM CHLORIDE: 9 INJECTION, SOLUTION INTRAVENOUS at 07:02

## 2025-02-27 RX ADMIN — FAMOTIDINE 20 MG: 20 TABLET, FILM COATED ORAL at 09:02

## 2025-02-27 RX ADMIN — TRAMADOL HYDROCHLORIDE 50 MG: 50 TABLET, COATED ORAL at 11:02

## 2025-02-27 RX ADMIN — CEFTRIAXONE SODIUM 1 G: 1 INJECTION, POWDER, FOR SOLUTION INTRAMUSCULAR; INTRAVENOUS at 11:02

## 2025-02-27 RX ADMIN — POTASSIUM CHLORIDE 10 MEQ: 7.46 INJECTION, SOLUTION INTRAVENOUS at 12:02

## 2025-02-27 RX ADMIN — POTASSIUM CHLORIDE 10 MEQ: 7.46 INJECTION, SOLUTION INTRAVENOUS at 09:02

## 2025-02-27 RX ADMIN — MIDAZOLAM 2 MG: 1 INJECTION INTRAMUSCULAR; INTRAVENOUS at 07:02

## 2025-02-27 RX ADMIN — MIDODRINE HYDROCHLORIDE 5 MG: 5 TABLET ORAL at 11:02

## 2025-02-27 RX ADMIN — LEVALBUTEROL 1.25 MG: 1.25 SOLUTION, CONCENTRATE RESPIRATORY (INHALATION) at 08:02

## 2025-02-27 RX ADMIN — FUROSEMIDE 120 MG: 40 TABLET ORAL at 05:02

## 2025-02-27 RX ADMIN — FENTANYL CITRATE 50 MCG: 50 INJECTION INTRAMUSCULAR; INTRAVENOUS at 07:02

## 2025-02-27 RX ADMIN — METOPROLOL TARTRATE 25 MG: 25 TABLET, FILM COATED ORAL at 09:02

## 2025-02-27 RX ADMIN — PROPOFOL 50 MG: 10 INJECTION, EMULSION INTRAVENOUS at 08:02

## 2025-02-27 RX ADMIN — MIDODRINE HYDROCHLORIDE 5 MG: 5 TABLET ORAL at 02:02

## 2025-02-27 RX ADMIN — POTASSIUM CHLORIDE 10 MEQ: 7.46 INJECTION, SOLUTION INTRAVENOUS at 10:02

## 2025-02-27 RX ADMIN — Medication 100 MG: at 09:02

## 2025-02-27 RX ADMIN — PROPOFOL 50 MG: 10 INJECTION, EMULSION INTRAVENOUS at 07:02

## 2025-02-27 RX ADMIN — FUROSEMIDE 120 MG: 40 TABLET ORAL at 09:02

## 2025-02-27 RX ADMIN — FENTANYL CITRATE 50 MCG: 50 INJECTION INTRAMUSCULAR; INTRAVENOUS at 08:02

## 2025-02-27 RX ADMIN — LEVALBUTEROL 1.25 MG: 1.25 SOLUTION, CONCENTRATE RESPIRATORY (INHALATION) at 03:02

## 2025-02-27 RX ADMIN — LEVALBUTEROL 1.25 MG: 1.25 SOLUTION, CONCENTRATE RESPIRATORY (INHALATION) at 11:02

## 2025-02-27 RX ADMIN — ONDANSETRON 4 MG: 2 INJECTION INTRAMUSCULAR; INTRAVENOUS at 07:02

## 2025-02-27 NOTE — SUBJECTIVE & OBJECTIVE
No current facility-administered medications on file prior to encounter.     Current Outpatient Medications on File Prior to Encounter   Medication Sig    famotidine (PEPCID) 20 MG tablet TAKE 1 TABLET BY MOUTH EVERY OTHER DAY    furosemide (LASIX) 80 MG tablet Take 80 mg by mouth every Tuesday, Thursday, Saturday, .    lactulose (CHRONULAC) 20 gram/30 mL Soln Take 45 mLs (30 g total) by mouth 3 (three) times daily. TITRATE TO 3-4 BOWEL MOVEMENTS DAILY.    metoprolol tartrate (LOPRESSOR) 25 MG tablet TAKE 0.5 TABLETS BY MOUTH 2 TIMES DAILY.    midodrine (PROAMATINE) 5 MG Tab Take 3 tablets (15 mg total) by mouth every 8 (eight) hours.    potassium chloride (K-TAB) 20 mEq SMARTSI Tablet(s) By Mouth    tamsulosin (FLOMAX) 0.4 mg Cap Take by mouth.    tuberculin,purif.prot.deriv. (TUBERSOL IDRM) Inject 0.1 mLs into the skin.       Review of patient's allergies indicates:  No Known Allergies    Past Medical History:   Diagnosis Date    Atrial fibrillation     Bulging disc     Cirrhosis     Colon polyp 2017    Rpt 5 yrs    EV (esophageal varices)     Hypertension 2023    Renal disorder     Skin cancer 2017    Thrombocytopenia      Past Surgical History:   Procedure Laterality Date    CATHETERIZATION OF BOTH LEFT AND RIGHT HEART N/A 2023    Procedure: CATHETERIZATION, HEART, BOTH LEFT AND RIGHT;  Surgeon: Flo Malone MD;  Location: [facility] CATH LAB;  Service: Cardiology;  Laterality: N/A;  low bleeding risk 1.0%    CHOLECYSTECTOMY      COLONOSCOPY      COLONOSCOPY N/A 2017    ta rpt     CORONARY ANGIOGRAPHY N/A 2023    Procedure: ANGIOGRAM, CORONARY ARTERY;  Surgeon: Flo Malone MD;  Location: [facility] CATH LAB;  Service: Cardiology;  Laterality: N/A;    HERNIA REPAIR      SKIN BIOPSY  2017    TONSILLECTOMY      UPPER GASTROINTESTINAL ENDOSCOPY      EV    UPPER GASTROINTESTINAL ENDOSCOPY      VASECTOMY       Family History       Problem Relation (Age of Onset)     Cancer Maternal Uncle    Heart disease Maternal Uncle    Hyperlipidemia Brother    Ovarian cancer Sister          Tobacco Use    Smoking status: Never     Passive exposure: Never    Smokeless tobacco: Never   Substance and Sexual Activity    Alcohol use: Not Currently     Alcohol/week: 0.0 standard drinks of alcohol    Drug use: No    Sexual activity: Not Currently     Review of Systems   Constitutional:  Negative for activity change, appetite change, chills and fever.   Respiratory:  Negative for chest tightness and shortness of breath.    Cardiovascular:  Negative for chest pain.   Gastrointestinal:  Negative for abdominal distention, abdominal pain, anal bleeding, blood in stool, constipation, diarrhea, nausea, rectal pain and vomiting.     Objective:     Vital Signs (Most Recent):  Temp: 97.8 °F (36.6 °C) (02/27/25 0417)  Pulse: 96 (02/27/25 0417)  Resp: 18 (02/27/25 0417)  BP: 118/70 (02/27/25 0417)  SpO2: 98 % (02/27/25 0417) Vital Signs (24h Range):  Temp:  [97.8 °F (36.6 °C)-98.7 °F (37.1 °C)] 97.8 °F (36.6 °C)  Pulse:  [] 96  Resp:  [18-20] 18  SpO2:  [98 %-100 %] 98 %  BP: (106-131)/(53-70) 118/70     Weight: 131.5 kg (290 lb)  Body mass index is 38.26 kg/m².     Physical Exam  Vitals reviewed.   Constitutional:       General: He is not in acute distress.     Appearance: He is obese. He is not ill-appearing or toxic-appearing.   Cardiovascular:      Rate and Rhythm: Normal rate and regular rhythm.   Pulmonary:      Effort: Pulmonary effort is normal. No respiratory distress.   Abdominal:      General: There is no distension.      Palpations: Abdomen is soft.      Tenderness: There is no abdominal tenderness. There is no guarding or rebound.   Musculoskeletal:      Right lower leg: No edema.      Left lower leg: No edema.   Skin:     General: Skin is warm and dry.      Capillary Refill: Capillary refill takes less than 2 seconds.      Comments: 12 x 10 cm area of erythema and fluctuance to medial  right thigh    Neurological:      Mental Status: He is alert. Mental status is at baseline.            I have reviewed all pertinent lab results within the past 24 hours.  CBC:   Recent Labs   Lab 02/27/25  0603   WBC 6.45   RBC 3.50*   HGB 10.4*   HCT 31.1*   PLT 71*   MCV 89   MCH 29.7   MCHC 33.4     CMP:   Recent Labs   Lab 02/26/25  0529      CALCIUM 8.0*   ALBUMIN 1.9*   PROT 6.2   *   K 2.9*   CO2 29   CL 98   BUN 21   CREATININE 1.9*   ALKPHOS 70   ALT 12   AST 31   BILITOT 3.2*       Significant Diagnostics:  I have reviewed all pertinent imaging results/findings within the past 24 hours.

## 2025-02-27 NOTE — ASSESSMENT & PLAN NOTE
US Soft tissue demonstrating large abscess vs hematoma   To OR 2/27 for I&D of right thigh abscess   NPO since midnight - may resume diet post op  Informed consent obtained   In Afib with RVR on admission - repeat EKG with rate decreased to 90 - chronic

## 2025-02-27 NOTE — PLAN OF CARE
02/27/25 1506   Medicare Message   Important Message from Medicare regarding Discharge Appeal Rights Given to patient/caregiver;Explained to patient/caregiver;Other (comments)  (Reached out to Karis Elliott per phone.)   Date IMM was signed 02/27/25   Time IMM was signed 1506     Patient sleeping soundly at this time  Copy of Important Medicare Message placed at bedside table.  Reached out per phone to family member, Karis.  Informed her of Medicare discharge appeal rights, explained IM, and copy at bedside.  Documenting delivery of IM.

## 2025-02-27 NOTE — ASSESSMENT & PLAN NOTE
Patient has Abnormal Magnesium: hypomagnesemia. Will continue to monitor electrolytes closely. Will replace the affected electrolytes and repeat labs to be done after interventions completed. The patient's magnesium results have been reviewed and are listed below.  Recent Labs   Lab 02/27/25  0603   MG 1.8

## 2025-02-27 NOTE — TRANSFER OF CARE
Anesthesia Transfer of Care Note    Patient: Juan Carlos Yoo .    Procedure(s) Performed: Procedure(s) (LRB):  Incision and Drainage, thigh (Right)    Patient location: Other: OR    Anesthesia Type: MAC    Transport from OR: Transported from OR on room air with adequate spontaneous ventilation    Post pain: adequate analgesia    Post assessment: no apparent anesthetic complications    Post vital signs: stable    Level of consciousness: awake    Nausea/Vomiting: no nausea/vomiting    Complications: none    Transfer of care protocol was followed      Last vitals:   90/50  16 RR  37 C TEMP  96 HR  100% O2 SAT

## 2025-02-27 NOTE — CONSULTS
[facility]  General Surgery  Consult Note    Patient Name: Juan Carlos Yoo Sr.  MRN: [id number]  Code Status: Full Code  Admission Date: 2025  Hospital Length of Stay: 5 days  Attending Physician: Joao Villegas III, MD  Primary Care Provider: Joao Villegas III, MD    Patient information was obtained from patient and ER records.     Inpatient consult to General Surgery  Consult performed by: Kayla Foster MD  Consult ordered by: Pilar Melgar NP        Subjective:     Principal Problem: Rhinovirus infection    History of Present Illness: 70yo male with history of Afib, liver cirrhosis, and ESRD who presents with right thigh abscess with cellulitis.  GS consulted for evaluation.     No current facility-administered medications on file prior to encounter.     Current Outpatient Medications on File Prior to Encounter   Medication Sig    famotidine (PEPCID) 20 MG tablet TAKE 1 TABLET BY MOUTH EVERY OTHER DAY    furosemide (LASIX) 80 MG tablet Take 80 mg by mouth every Tuesday, Thursday, Saturday, .    lactulose (CHRONULAC) 20 gram/30 mL Soln Take 45 mLs (30 g total) by mouth 3 (three) times daily. TITRATE TO 3-4 BOWEL MOVEMENTS DAILY.    metoprolol tartrate (LOPRESSOR) 25 MG tablet TAKE 0.5 TABLETS BY MOUTH 2 TIMES DAILY.    midodrine (PROAMATINE) 5 MG Tab Take 3 tablets (15 mg total) by mouth every 8 (eight) hours.    potassium chloride (K-TAB) 20 mEq SMARTSI Tablet(s) By Mouth    tamsulosin (FLOMAX) 0.4 mg Cap Take by mouth.    tuberculin,purif.prot.deriv. (TUBERSOL IDRM) Inject 0.1 mLs into the skin.       Review of patient's allergies indicates:  No Known Allergies    Past Medical History:   Diagnosis Date    Atrial fibrillation     Bulging disc     Cirrhosis     Colon polyp 2017    Rpt 5 yrs    EV (esophageal varices)     Hypertension 2023    Renal disorder     Skin cancer 2017    Thrombocytopenia      Past Surgical History:   Procedure Laterality Date     CATHETERIZATION OF BOTH LEFT AND RIGHT HEART N/A 2/8/2023    Procedure: CATHETERIZATION, HEART, BOTH LEFT AND RIGHT;  Surgeon: Flo Malone MD;  Location: [facility] CATH LAB;  Service: Cardiology;  Laterality: N/A;  low bleeding risk 1.0%    CHOLECYSTECTOMY      COLONOSCOPY      COLONOSCOPY N/A 12/29/2017    ta rpt 2022    CORONARY ANGIOGRAPHY N/A 2/8/2023    Procedure: ANGIOGRAM, CORONARY ARTERY;  Surgeon: Flo Malone MD;  Location: [facility] CATH LAB;  Service: Cardiology;  Laterality: N/A;    HERNIA REPAIR      SKIN BIOPSY  03/2017    TONSILLECTOMY      UPPER GASTROINTESTINAL ENDOSCOPY  2011    EV    UPPER GASTROINTESTINAL ENDOSCOPY  2016    VASECTOMY       Family History       Problem Relation (Age of Onset)    Cancer Maternal Uncle    Heart disease Maternal Uncle    Hyperlipidemia Brother    Ovarian cancer Sister          Tobacco Use    Smoking status: Never     Passive exposure: Never    Smokeless tobacco: Never   Substance and Sexual Activity    Alcohol use: Not Currently     Alcohol/week: 0.0 standard drinks of alcohol    Drug use: No    Sexual activity: Not Currently     Review of Systems   Constitutional:  Negative for activity change, appetite change, chills and fever.   Respiratory:  Negative for chest tightness and shortness of breath.    Cardiovascular:  Negative for chest pain.   Gastrointestinal:  Negative for abdominal distention, abdominal pain, anal bleeding, blood in stool, constipation, diarrhea, nausea, rectal pain and vomiting.     Objective:     Vital Signs (Most Recent):  Temp: 97.8 °F (36.6 °C) (02/27/25 0417)  Pulse: 96 (02/27/25 0417)  Resp: 18 (02/27/25 0417)  BP: 118/70 (02/27/25 0417)  SpO2: 98 % (02/27/25 0417) Vital Signs (24h Range):  Temp:  [97.8 °F (36.6 °C)-98.7 °F (37.1 °C)] 97.8 °F (36.6 °C)  Pulse:  [] 96  Resp:  [18-20] 18  SpO2:  [98 %-100 %] 98 %  BP: (106-131)/(53-70) 118/70     Weight: 131.5 kg (290 lb)  Body mass index is 38.26 kg/m².     Physical Exam  Vitals  reviewed.   Constitutional:       General: He is not in acute distress.     Appearance: He is obese. He is not ill-appearing or toxic-appearing.   Cardiovascular:      Rate and Rhythm: Normal rate and regular rhythm.   Pulmonary:      Effort: Pulmonary effort is normal. No respiratory distress.   Abdominal:      General: There is no distension.      Palpations: Abdomen is soft.      Tenderness: There is no abdominal tenderness. There is no guarding or rebound.   Musculoskeletal:      Right lower leg: No edema.      Left lower leg: No edema.   Skin:     General: Skin is warm and dry.      Capillary Refill: Capillary refill takes less than 2 seconds.      Comments: 12 x 10 cm area of erythema and fluctuance to medial right thigh    Neurological:      Mental Status: He is alert. Mental status is at baseline.            I have reviewed all pertinent lab results within the past 24 hours.  CBC:   Recent Labs   Lab 02/27/25  0603   WBC 6.45   RBC 3.50*   HGB 10.4*   HCT 31.1*   PLT 71*   MCV 89   MCH 29.7   MCHC 33.4     CMP:   Recent Labs   Lab 02/26/25  0529      CALCIUM 8.0*   ALBUMIN 1.9*   PROT 6.2   *   K 2.9*   CO2 29   CL 98   BUN 21   CREATININE 1.9*   ALKPHOS 70   ALT 12   AST 31   BILITOT 3.2*       Significant Diagnostics:  I have reviewed all pertinent imaging results/findings within the past 24 hours.    Assessment/Plan:     Cellulitis of right lower extremity  US Soft tissue demonstrating large abscess vs hematoma   To OR 2/27 for I&D of right thigh abscess   NPO since midnight - may resume diet post op  Informed consent obtained   In Afib with RVR on admission - repeat EKG with rate decreased to 90 - chronic         VTE Risk Mitigation (From admission, onward)           Ordered     Place sequential compression device  Until discontinued         02/22/25 1002     IP VTE HIGH RISK PATIENT  Once         02/21/25 2204     Place ANDREW hose  Until discontinued         02/21/25 2204                     Thank you for your consult. I will follow-up with patient. Please contact us if you have any additional questions.    Kayla Foster MD  General Surgery  [facility]

## 2025-02-27 NOTE — SUBJECTIVE & OBJECTIVE
Past Medical History:   Diagnosis Date    Atrial fibrillation     Bulging disc     Cirrhosis     Colon polyp 2017    Rpt 5 yrs    EV (esophageal varices)     Hypertension 2023    Renal disorder     Skin cancer 2017    Thrombocytopenia        Past Surgical History:   Procedure Laterality Date    CATHETERIZATION OF BOTH LEFT AND RIGHT HEART N/A 2023    Procedure: CATHETERIZATION, HEART, BOTH LEFT AND RIGHT;  Surgeon: Flo Malone MD;  Location: [facility] CATH LAB;  Service: Cardiology;  Laterality: N/A;  low bleeding risk 1.0%    CHOLECYSTECTOMY      COLONOSCOPY      COLONOSCOPY N/A 2017    ta rpt     CORONARY ANGIOGRAPHY N/A 2023    Procedure: ANGIOGRAM, CORONARY ARTERY;  Surgeon: Flo Malone MD;  Location: [facility] CATH LAB;  Service: Cardiology;  Laterality: N/A;    HERNIA REPAIR      SKIN BIOPSY  2017    TONSILLECTOMY      UPPER GASTROINTESTINAL ENDOSCOPY      EV    UPPER GASTROINTESTINAL ENDOSCOPY  2016    VASECTOMY         Review of patient's allergies indicates:  No Known Allergies    No current facility-administered medications on file prior to encounter.     Current Outpatient Medications on File Prior to Encounter   Medication Sig    famotidine (PEPCID) 20 MG tablet TAKE 1 TABLET BY MOUTH EVERY OTHER DAY    furosemide (LASIX) 80 MG tablet Take 80 mg by mouth every Tuesday, Thursday, Saturday, .    lactulose (CHRONULAC) 20 gram/30 mL Soln Take 45 mLs (30 g total) by mouth 3 (three) times daily. TITRATE TO 3-4 BOWEL MOVEMENTS DAILY.    metoprolol tartrate (LOPRESSOR) 25 MG tablet TAKE 0.5 TABLETS BY MOUTH 2 TIMES DAILY.    midodrine (PROAMATINE) 5 MG Tab Take 3 tablets (15 mg total) by mouth every 8 (eight) hours.    potassium chloride (K-TAB) 20 mEq SMARTSI Tablet(s) By Mouth    tamsulosin (FLOMAX) 0.4 mg Cap Take by mouth.    tuberculin,purif.prot.deriv. (TUBERSOL IDRM) Inject 0.1 mLs into the skin.     Family History       Problem Relation (Age of Onset)     Cancer Maternal Uncle    Heart disease Maternal Uncle    Hyperlipidemia Brother    Ovarian cancer Sister          Tobacco Use    Smoking status: Never     Passive exposure: Never    Smokeless tobacco: Never   Substance and Sexual Activity    Alcohol use: Not Currently     Alcohol/week: 0.0 standard drinks of alcohol    Drug use: No    Sexual activity: Not Currently     Review of Systems   Constitutional:  Positive for fatigue. Negative for activity change, appetite change, chills and fever.   HENT:  Negative for ear pain, mouth sores, nosebleeds and sore throat.    Eyes:  Negative for visual disturbance.   Respiratory:  Positive for cough and wheezing. Negative for chest tightness, shortness of breath and stridor.    Cardiovascular:  Positive for leg swelling. Negative for chest pain and palpitations.   Gastrointestinal:  Negative for abdominal distention, abdominal pain, anal bleeding, blood in stool, constipation, diarrhea, nausea, rectal pain and vomiting.   Endocrine: Negative for polyphagia.   Genitourinary:  Positive for difficulty urinating. Negative for dysuria, flank pain and frequency.   Musculoskeletal:  Positive for gait problem. Negative for myalgias.   Skin:  Positive for color change and rash.   Neurological:  Positive for weakness. Negative for dizziness, tremors, seizures, syncope, facial asymmetry, speech difficulty and headaches.   Hematological:  Negative for adenopathy.   Psychiatric/Behavioral:  Positive for confusion. Negative for agitation and hallucinations. The patient is not nervous/anxious.      Objective:     Vital Signs (Most Recent):  Temp: 98 °F (36.7 °C) (02/27/25 0840)  Pulse: 91 (02/27/25 0840)  Resp: 18 (02/27/25 0840)  BP: 110/64 (02/27/25 0840)  SpO2: 95 % (02/27/25 0840) Vital Signs (24h Range):  Temp:  [97.8 °F (36.6 °C)-98.7 °F (37.1 °C)] 98 °F (36.7 °C)  Pulse:  [] 91  Resp:  [18-20] 18  SpO2:  [95 %-100 %] 95 %  BP: (106-131)/(53-70) 110/64     Weight: 131.5 kg (290  lb)  Body mass index is 38.26 kg/m².     Physical Exam  Vitals and nursing note reviewed.   Constitutional:       General: He is not in acute distress.     Appearance: He is obese. He is not ill-appearing, toxic-appearing or diaphoretic.   HENT:      Head: Normocephalic and atraumatic.      Right Ear: External ear normal.      Left Ear: External ear normal.      Nose: Nose normal. No rhinorrhea.      Mouth/Throat:      Mouth: Mucous membranes are moist.      Pharynx: No oropharyngeal exudate or posterior oropharyngeal erythema.   Eyes:      General: Scleral icterus present.      Extraocular Movements: Extraocular movements intact.   Neck:      Vascular: No carotid bruit.   Cardiovascular:      Rate and Rhythm: Normal rate and regular rhythm.      Heart sounds: Normal heart sounds. No murmur heard.     No friction rub. No gallop.   Pulmonary:      Effort: Pulmonary effort is normal. No respiratory distress.      Breath sounds: No stridor. Wheezing and rhonchi present. No rales.   Chest:      Chest wall: No tenderness.   Abdominal:      General: There is no distension.      Palpations: Abdomen is soft. There is no mass.      Tenderness: There is no abdominal tenderness. There is no right CVA tenderness, left CVA tenderness, guarding or rebound.      Hernia: No hernia is present.   Musculoskeletal:         General: No swelling, tenderness or deformity.      Cervical back: No rigidity.      Right lower leg: No edema.      Left lower leg: No edema.   Lymphadenopathy:      Cervical: No cervical adenopathy.   Skin:     General: Skin is warm and dry.      Capillary Refill: Capillary refill takes less than 2 seconds.      Coloration: Skin is jaundiced.      Findings: Erythema and rash present.             Comments: 12 x 10 cm area of erythema and fluctuance to medial right thigh s/p I&D this morning.  ACE wrap in place without drainage.   Neurological:      Mental Status: He is alert. Mental status is at baseline.       Cranial Nerves: No cranial nerve deficit.      Sensory: No sensory deficit.      Motor: Weakness present.      Coordination: Coordination normal.   Psychiatric:         Mood and Affect: Mood normal.         Behavior: Behavior normal.         Thought Content: Thought content normal.         Judgment: Judgment normal.                Significant Labs: All pertinent labs within the past 24 hours have been reviewed.  Recent Lab Results         02/27/25  0603   02/26/25  1653        Albumin 2.0         ALP 74         ALT 11         Anion Gap 11         AST 32         Baso # 0.06         Basophil % 0.9         BILIRUBIN TOTAL 3.7  Comment: For infants and newborns, interpretation of results should be based  on gestational age, weight and in agreement with clinical  observations.    Premature Infant recommended reference ranges:  Up to 24 hours.............<8.0 mg/dL  Up to 48 hours............<12.0 mg/dL  3-5 days..................<15.0 mg/dL  6-29 days.................<15.0 mg/dL           BUN 23         Calcium 8.4         Chloride 94         CO2 30         Creatinine 1.8         Differential Method Automated         eGFR 39.7         Eos # 0.1         Eos % 0.9         Glucose 95         Gran # (ANC) 4.2         Gran % 65.7         Hematocrit 31.1         Hemoglobin 10.4         Immature Grans (Abs) 0.04  Comment: Mild elevation in immature granulocytes is non specific and   can be seen in a variety of conditions including stress response,   acute inflammation, trauma and pregnancy. Correlation with other   laboratory and clinical findings is essential.           Immature Granulocytes 0.6         Lymph # 0.8         Lymph % 12.2         Magnesium  1.8         MCH 29.7         MCHC 33.4         MCV 89         Mono # 1.3         Mono % 19.7         MPV 11.6         nRBC 0         QRS Duration   100       OHS QTC Calculation   502       Platelet Count 71         Potassium 2.8         PROTEIN TOTAL 6.5         RBC 3.50          RDW 19.0         Sodium 135         WBC 6.45                 Significant Imaging: I have reviewed all pertinent imaging results/findings within the past 24 hours.

## 2025-02-27 NOTE — ASSESSMENT & PLAN NOTE
Conservative care, + now with O2 needs.    2/24:  Continue conservative care.  Symptoms improving.  Patient with stable O2 sats on room air.    2/27:  Stable.  Continue conservative care.

## 2025-02-27 NOTE — ASSESSMENT & PLAN NOTE
Patient has long standing persistent (>12 months) atrial fibrillation. Patient is currently in atrial fibrillation. [id number] Score: 1. The patients heart rate in the last 24 hours is as follows:  Pulse  Min: 85  Max: 105     Antiarrhythmics  metoprolol tartrate (LOPRESSOR) tablet 25 mg, 2 times daily, Oral    Anticoagulants       Plan  - Replete lytes with a goal of K>4, Mg >2  - Patient is anticoagulated, see medications listed above.  - Patient's afib is currently controlled    Auto anticoagulated with CLD.

## 2025-02-27 NOTE — PLAN OF CARE
Problem: Adult Inpatient Plan of Care  Goal: Readiness for Transition of Care  Outcome: Not Progressing         Problem: Adult Inpatient Plan of Care  Goal: Plan of Care Review  Outcome: Progressing  Goal: Patient-Specific Goal (Individualized)  Outcome: Progressing  Goal: Absence of Hospital-Acquired Illness or Injury  Outcome: Progressing  Goal: Optimal Comfort and Wellbeing  Outcome: Progressing     Problem: Acute Kidney Injury/Impairment  Goal: Fluid and Electrolyte Balance  Outcome: Progressing  Goal: Improved Oral Intake  Outcome: Progressing  Goal: Effective Renal Function  Outcome: Progressing     Problem: Infection  Goal: Absence of Infection Signs and Symptoms  Outcome: Progressing     Problem: Wound  Goal: Optimal Coping  Outcome: Progressing  Goal: Optimal Functional Ability  Outcome: Progressing  Goal: Absence of Infection Signs and Symptoms  Outcome: Progressing  Goal: Improved Oral Intake  Outcome: Progressing  Goal: Optimal Pain Control and Function  Outcome: Progressing  Goal: Skin Health and Integrity  Outcome: Progressing  Goal: Optimal Wound Healing  Outcome: Progressing

## 2025-02-27 NOTE — PROGRESS NOTES
[facility] Medicine  Progress Note    Patient Name: Juan Carlos Yoo Sr.  MRN: [id number]  Patient Class: IP- Inpatient   Admission Date: 2/21/2025  Length of Stay: 5 days  Attending Physician: Joao Villegas III, MD  Primary Care Provider: Joao Villegas III, MD        Subjective     Principal Problem:Rhinovirus infection        HPI:  ED HPI:  Juan Carlos Yoo Sr. is a 69 y.o. male with PMHX of alcoholic cirrhosis, HCC, thrombocytopenia, Afib (not on anticoagulation due to thrombocytopenia), ESRD on HD M/W/F who presents to the emergency department C/O SOB.     Patient was recently  admitted to [facility] due to severe anasarca JAE, evaluated for hepatorenal syndrome and had 40 day hospitalization.  Patient released from rehab about a week ago.  Has endorse increasing shortness of breath for the past 3 days.  States at night it is worse when trying to sleep.  Patient lays on his side and uses 3 pillows to prop his head up.  Patient went to urgent care today because he felt like tonight was going to be another bad night and they sent him to ER.  He denies fever.  States swelling is about baseline.  Had typical dialysis session today.  He reports they were trying to take off 3 L but is unsure of how much volume was removed.  He makes urine but states it is difficult to urinate due to anasarca.  He states compliance to low-sodium diet and fluid restriction since recent discharge.  Has not had alcohol in over 7 months.    IM HPI:  Patient's chart reviewed and above history noted.  Patient's been admitted for acute on chronic dyspnea and was diagnosed with rhinovirus.  Patient had been having increasing dyspnea weakness coughing with clear sputum production over the last 36 hours and ultimately presented with worsening shortness of breath.  He has been placed on oxygen nasal cannula states he is feeling much better.  Patient has a mild cough and wheezing on exam.  Patient has a complex  medical history that is has been reviewed he seems slightly hypervolemic we will notify his nephrologist and he will likely need his routine hemodialysis if he ends up staying through Monday.  Patient is afebrile labs noted potassium low we will replete and maybe a good candidate for Aldactone.    Overview/Hospital Course:  2/23 FM:  Patient complaining of a area of redness and pain on the right thigh above the knee.  This started yesterday.  Patient's exam consistent with a area of focal cellulitis and induration.  Ordering ultrasound and starting antibiotics.  Patient's respiratory complaints are much improved and getting back to his baseline.  Patient's nephrologist aware he is in house and may need hemodialysis in a.m..    2/24 DL:  Patient doing well this morning.  He is sitting up in bed in his awake, alert, oriented.  Patient reports respiratory status improved.  Patient now off of supplemental O2.  Patient reports continued tenderness to area of redness to right inner thigh.  Redness and induration have spread past previously marked borders.  Patient is scheduled for ultrasound today.  Continue IV antibiotics.  Patient is scheduled for hemodialysis today.  Nephrology following.    2/25 DL:  Patient doing well this morning.  He is lying in bed in his awake, alert, oriented.  Respiratory status with continued improvement.  Labs and vital signs stable.  Ultrasound right thigh obtained yesterday reveals 12.6 x 6.1 x 3.0 cm complex fluid collection to the medial right thigh.  Venous ultrasound negative for DVT.  General surgery consulted.  Continue current IV antibiotics.  Renal function stable.  Nephrology following.    2/26 DL:  Patient doing well this morning.  He is lying in bed in his awake, alert, oriented.  Respiratory status at baseline.  Vital signs stable.  Patient afebrile without leukocytosis.  Continue current antibiotic treatment for abscess to right thigh.  General surgery consulted.  Appreciate  recs.  Hypokalemia and hypomagnesemia noted this morning.  We will replenish.  Nephrology following.  Appreciate recs.    2/27 DL:  Patient lying in bed resting comfortably.  He has just returned to room from OR for I and D of right inner thigh.  Patient tolerated procedure well.  Patient is afebrile without leukocytosis this morning.  We will continue current IV antibiotic treatment for right thigh abscess pending results of culture obtained per general surgery this morning.  Patient's potassium 2.8 this morning.  Continue p.o. replacement.  We will also give IV replacement today.  Magnesium pending.  Labs and vital signs otherwise stable.  Renal function stable.  Nephrology following.    Past Medical History:   Diagnosis Date    Atrial fibrillation     Bulging disc     Cirrhosis     Colon polyp 12/29/2017    Rpt 5 yrs    EV (esophageal varices)     Hypertension 03/30/2023    Renal disorder     Skin cancer 03/2017    Thrombocytopenia        Past Surgical History:   Procedure Laterality Date    CATHETERIZATION OF BOTH LEFT AND RIGHT HEART N/A 2/8/2023    Procedure: CATHETERIZATION, HEART, BOTH LEFT AND RIGHT;  Surgeon: Flo Malone MD;  Location: [facility] CATH LAB;  Service: Cardiology;  Laterality: N/A;  low bleeding risk 1.0%    CHOLECYSTECTOMY      COLONOSCOPY      COLONOSCOPY N/A 12/29/2017    ta rpt 2022    CORONARY ANGIOGRAPHY N/A 2/8/2023    Procedure: ANGIOGRAM, CORONARY ARTERY;  Surgeon: Flo Malone MD;  Location: [facility] CATH LAB;  Service: Cardiology;  Laterality: N/A;    HERNIA REPAIR      SKIN BIOPSY  03/2017    TONSILLECTOMY      UPPER GASTROINTESTINAL ENDOSCOPY  2011    EV    UPPER GASTROINTESTINAL ENDOSCOPY  2016    VASECTOMY         Review of patient's allergies indicates:  No Known Allergies    No current facility-administered medications on file prior to encounter.     Current Outpatient Medications on File Prior to Encounter   Medication Sig    famotidine (PEPCID) 20 MG tablet TAKE 1 TABLET  BY MOUTH EVERY OTHER DAY    furosemide (LASIX) 80 MG tablet Take 80 mg by mouth every Tuesday, Thursday, Saturday, .    lactulose (CHRONULAC) 20 gram/30 mL Soln Take 45 mLs (30 g total) by mouth 3 (three) times daily. TITRATE TO 3-4 BOWEL MOVEMENTS DAILY.    metoprolol tartrate (LOPRESSOR) 25 MG tablet TAKE 0.5 TABLETS BY MOUTH 2 TIMES DAILY.    midodrine (PROAMATINE) 5 MG Tab Take 3 tablets (15 mg total) by mouth every 8 (eight) hours.    potassium chloride (K-TAB) 20 mEq SMARTSI Tablet(s) By Mouth    tamsulosin (FLOMAX) 0.4 mg Cap Take by mouth.    tuberculin,purif.prot.deriv. (TUBERSOL IDRM) Inject 0.1 mLs into the skin.     Family History       Problem Relation (Age of Onset)    Cancer Maternal Uncle    Heart disease Maternal Uncle    Hyperlipidemia Brother    Ovarian cancer Sister          Tobacco Use    Smoking status: Never     Passive exposure: Never    Smokeless tobacco: Never   Substance and Sexual Activity    Alcohol use: Not Currently     Alcohol/week: 0.0 standard drinks of alcohol    Drug use: No    Sexual activity: Not Currently     Review of Systems   Constitutional:  Positive for fatigue. Negative for activity change, appetite change, chills and fever.   HENT:  Negative for ear pain, mouth sores, nosebleeds and sore throat.    Eyes:  Negative for visual disturbance.   Respiratory:  Positive for cough and wheezing. Negative for chest tightness, shortness of breath and stridor.    Cardiovascular:  Positive for leg swelling. Negative for chest pain and palpitations.   Gastrointestinal:  Negative for abdominal distention, abdominal pain, anal bleeding, blood in stool, constipation, diarrhea, nausea, rectal pain and vomiting.   Endocrine: Negative for polyphagia.   Genitourinary:  Positive for difficulty urinating. Negative for dysuria, flank pain and frequency.   Musculoskeletal:  Positive for gait problem. Negative for myalgias.   Skin:  Positive for color change and rash.   Neurological:   Positive for weakness. Negative for dizziness, tremors, seizures, syncope, facial asymmetry, speech difficulty and headaches.   Hematological:  Negative for adenopathy.   Psychiatric/Behavioral:  Positive for confusion. Negative for agitation and hallucinations. The patient is not nervous/anxious.      Objective:     Vital Signs (Most Recent):  Temp: 98 °F (36.7 °C) (02/27/25 0840)  Pulse: 91 (02/27/25 0840)  Resp: 18 (02/27/25 0840)  BP: 110/64 (02/27/25 0840)  SpO2: 95 % (02/27/25 0840) Vital Signs (24h Range):  Temp:  [97.8 °F (36.6 °C)-98.7 °F (37.1 °C)] 98 °F (36.7 °C)  Pulse:  [] 91  Resp:  [18-20] 18  SpO2:  [95 %-100 %] 95 %  BP: (106-131)/(53-70) 110/64     Weight: 131.5 kg (290 lb)  Body mass index is 38.26 kg/m².     Physical Exam  Vitals and nursing note reviewed.   Constitutional:       General: He is not in acute distress.     Appearance: He is obese. He is not ill-appearing, toxic-appearing or diaphoretic.   HENT:      Head: Normocephalic and atraumatic.      Right Ear: External ear normal.      Left Ear: External ear normal.      Nose: Nose normal. No rhinorrhea.      Mouth/Throat:      Mouth: Mucous membranes are moist.      Pharynx: No oropharyngeal exudate or posterior oropharyngeal erythema.   Eyes:      General: Scleral icterus present.      Extraocular Movements: Extraocular movements intact.   Neck:      Vascular: No carotid bruit.   Cardiovascular:      Rate and Rhythm: Normal rate and regular rhythm.      Heart sounds: Normal heart sounds. No murmur heard.     No friction rub. No gallop.   Pulmonary:      Effort: Pulmonary effort is normal. No respiratory distress.      Breath sounds: No stridor. Wheezing and rhonchi present. No rales.   Chest:      Chest wall: No tenderness.   Abdominal:      General: There is no distension.      Palpations: Abdomen is soft. There is no mass.      Tenderness: There is no abdominal tenderness. There is no right CVA tenderness, left CVA tenderness,  guarding or rebound.      Hernia: No hernia is present.   Musculoskeletal:         General: No swelling, tenderness or deformity.      Cervical back: No rigidity.      Right lower leg: No edema.      Left lower leg: No edema.   Lymphadenopathy:      Cervical: No cervical adenopathy.   Skin:     General: Skin is warm and dry.      Capillary Refill: Capillary refill takes less than 2 seconds.      Coloration: Skin is jaundiced.      Findings: Erythema and rash present.             Comments: 12 x 10 cm area of erythema and fluctuance to medial right thigh s/p I&D this morning.  ACE wrap in place without drainage.   Neurological:      Mental Status: He is alert. Mental status is at baseline.      Cranial Nerves: No cranial nerve deficit.      Sensory: No sensory deficit.      Motor: Weakness present.      Coordination: Coordination normal.   Psychiatric:         Mood and Affect: Mood normal.         Behavior: Behavior normal.         Thought Content: Thought content normal.         Judgment: Judgment normal.                Significant Labs: All pertinent labs within the past 24 hours have been reviewed.  Recent Lab Results         02/27/25  0603   02/26/25  1653        Albumin 2.0         ALP 74         ALT 11         Anion Gap 11         AST 32         Baso # 0.06         Basophil % 0.9         BILIRUBIN TOTAL 3.7  Comment: For infants and newborns, interpretation of results should be based  on gestational age, weight and in agreement with clinical  observations.    Premature Infant recommended reference ranges:  Up to 24 hours.............<8.0 mg/dL  Up to 48 hours............<12.0 mg/dL  3-5 days..................<15.0 mg/dL  6-29 days.................<15.0 mg/dL           BUN 23         Calcium 8.4         Chloride 94         CO2 30         Creatinine 1.8         Differential Method Automated         eGFR 39.7         Eos # 0.1         Eos % 0.9         Glucose 95         Gran # (ANC) 4.2         Gran % 65.7          Hematocrit 31.1         Hemoglobin 10.4         Immature Grans (Abs) 0.04  Comment: Mild elevation in immature granulocytes is non specific and   can be seen in a variety of conditions including stress response,   acute inflammation, trauma and pregnancy. Correlation with other   laboratory and clinical findings is essential.           Immature Granulocytes 0.6         Lymph # 0.8         Lymph % 12.2         Magnesium  1.8         MCH 29.7         MCHC 33.4         MCV 89         Mono # 1.3         Mono % 19.7         MPV 11.6         nRBC 0         QRS Duration   100       OHS QTC Calculation   502       Platelet Count 71         Potassium 2.8         PROTEIN TOTAL 6.5         RBC 3.50         RDW 19.0         Sodium 135         WBC 6.45                 Significant Imaging: I have reviewed all pertinent imaging results/findings within the past 24 hours.    Assessment and Plan     * Rhinovirus infection  Conservative care, + now with O2 needs.    2/24:  Continue conservative care.  Symptoms improving.  Patient with stable O2 sats on room air.    2/27:  Stable.  Continue conservative care.      Hypomagnesemia  Patient has Abnormal Magnesium: hypomagnesemia. Will continue to monitor electrolytes closely. Will replace the affected electrolytes and repeat labs to be done after interventions completed. The patient's magnesium results have been reviewed and are listed below.  Recent Labs   Lab 02/27/25  0603   MG 1.8          Cellulitis of right lower extremity  2/23 FM:  Starting abx, get US.    2/24:  Increased induration and erythema noted.  Ultrasound pending.  Patient afebrile without leukocytosis.  Continue IV antibiotics.      2/25:  Ultrasound obtained yesterday reveals 12.6 x 6.1 x 3.0 cm complex fluid collection to patient's medial right thigh.  General surgery consulted.  Appreciate recs.  Patient afebrile without leukocytosis.  Continue current IV antibiotic treatment.    2/26:  Patient remains afebrile  without leukocytosis.  Continue antibiotic treatment.  General surgery consulted.  Appreciate recs.    2/27:  S/P I&D per general surgery this morning.  Cultures pending.  Continue current IV abx treatment.    Hypokalemia  Patient's most recent potassium results are listed below.   Recent Labs     02/25/25  0516 02/26/25  0529 02/27/25  0603   K 3.5 2.9* 2.8*       Plan  - Replete potassium per protocol  - Monitor potassium Daily  - Patient's hypokalemia is stable  - Nephrology following.  Appreciate recs.    Morbid obesity  Body mass index is 38.26 kg/m². Morbid obesity complicates all aspects of disease management from diagnostic modalities to treatment. Weight loss encouraged and health benefits explained to patient.         Moderate protein-calorie malnutrition  Nutrition consulted. Most recent weight and BMI monitored-     Measurements:  Wt Readings from Last 1 Encounters:   02/21/25 131.5 kg (290 lb)   Body mass index is 38.26 kg/m².    Patient has been screened and assessed by RD.    Malnutrition Type:  Context:    Level:      Malnutrition Characteristic Summary:       Interventions/Recommendations (treatment strategy):         Hypotension  Blood pressure stable.  Continue monitoring.      Hepatic encephalopathy  Continue lactulose.      CKD (chronic kidney disease) stage 4, GFR 15-29 ml/min  Creatine stable for now. BMP reviewed- noted Estimated Creatinine Clearance: 53.5 mL/min (A) (based on SCr of 1.8 mg/dL (H)). according to latest data. Based on current GFR, CKD stage is stage 5 - GFR < 15.  Monitor UOP and serial BMP and adjust therapy as needed. Renally dose meds. Avoid nephrotoxic medications and procedures.    Per renal, currently on MWF HD, ? Still needs this?    Suspected Hepatorenal syndrome  Slightly hypervolemic today, much improved from past care.      Longstanding persistent atrial fibrillation  Patient has long standing persistent (>12 months) atrial fibrillation. Patient is currently in  atrial fibrillation. [id number] Score: 1. The patients heart rate in the last 24 hours is as follows:  Pulse  Min: 85  Max: 105     Antiarrhythmics  metoprolol tartrate (LOPRESSOR) tablet 25 mg, 2 times daily, Oral    Anticoagulants       Plan  - Replete lytes with a goal of K>4, Mg >2  - Patient is anticoagulated, see medications listed above.  - Patient's afib is currently controlled    Auto anticoagulated with CLD.        Coagulopathy  CLD      Cirrhosis  Patient with known Cirrhosis with Child's class C. Co-morbidities are present and inclusive of portal hypertension, hepatic encephalopathy, malnutrition, anemia/pancytopenia, and anticoagulation.  MELD-Na score calculated; MELD 3.0: 28 at 2/23/2025  5:15 AM  MELD-Na: 26 at 2/23/2025  5:15 AM  Calculated from:  Serum Creatinine: 1.8 mg/dL at 2/23/2025  5:15 AM  Serum Sodium: 133 mmol/L at 2/23/2025  5:15 AM  Total Bilirubin: 3.9 mg/dL at 2/23/2025  5:15 AM  Serum Albumin: 2.1 g/dL at 2/23/2025  5:15 AM  INR(ratio): 1.9 at 2/21/2025  6:42 PM  Age at listing (hypothetical): 69 years  Sex: Male at 2/23/2025  5:15 AM      Continue chronic meds. Etiology likely ETOH. Will avoid any hepatotoxic meds, and monitor CBC/CMP/INR for synthetic function.       VTE Risk Mitigation (From admission, onward)           Ordered     Place sequential compression device  Until discontinued         02/22/25 1002     IP VTE HIGH RISK PATIENT  Once         02/21/25 2204     Place ANDREW hose  Until discontinued         02/21/25 2204                    Discharge Planning   AUTUMN:      Code Status: Full Code   Medical Readiness for Discharge Date:   Discharge Plan A: Home                        Pilar Gates NP  Department of Hospital Medicine   [facility] S[facility]

## 2025-02-27 NOTE — ANESTHESIA POSTPROCEDURE EVALUATION
Anesthesia Post Evaluation    Patient: Juan Carlos Yoo     Procedure(s) Performed: Procedure(s) (LRB):  Incision and Drainage, thigh (Right)    Final Anesthesia Type: MAC      Patient location during evaluation: med/surg floor  Patient participation: Yes- Able to Participate  Level of consciousness: oriented and awake and alert  Post-procedure vital signs: reviewed and stable  Pain management: adequate  Airway patency: patent    PONV status at discharge: No PONV  Anesthetic complications: no      Cardiovascular status: blood pressure returned to baseline  Respiratory status: spontaneous ventilation, unassisted and room air  Hydration status: euvolemic  Follow-up not needed.              Vitals Value Taken Time   /64 02/27/25 08:40   Temp 36.7 °C (98 °F) 02/27/25 08:40   Pulse 91 02/27/25 08:40   Resp 18 02/27/25 08:40   SpO2 95 % 02/27/25 08:40         No case tracking events are documented in the log.      Pain/Avis Score: No data recorded

## 2025-02-27 NOTE — HPI
70yo male with history of Afib, liver cirrhosis, and ESRD who presents with right thigh abscess with cellulitis.  GS consulted for evaluation.

## 2025-02-27 NOTE — OP NOTE
"[facility] Periop Services  General Surgery Department  Operative Note    SUMMARY     Date of Procedure:2/27/2025    Procedure: Procedure(s) (LRB):  Incision and Drainage, thigh: 42300 (CPT®)   Right    Surgeon(s) and Role:  Kayla Foster MD     Pre-Operative Diagnosis: Pre-Op Diagnosis Codes:      * Abscess of right thigh [L02.415]    Post-Operative Diagnosis:   Infected hematoma of right thigh       Anesthesia: Local MAC    Findings:  -150cc serous fluid evacuated from medial right thigh suggestive of old hematoma  -14" penrose left in place     Indications for the Procedure:  68yo male who presents with 12cm fluid collection of the right medial thigh    Operative Conduct in Detail:   Patient is seen in holding where informed consent was obtained and surgical site was marked.  The patient is on the around the clock antibiotics on the floor.  All questions and concerns were addressed.  He was then wheeled to the operating room and placed in the supine position where monitored anesthesia care was administered.    A 2 cm stab incision was made on the lateral portion of the fluctuant area yielding a rush of straw-colored serous fluid.  Approximally 150 cc of fluid was evacuated from the cavity, suggesting a chronic and resolved hematoma.  A counter incision was created on the most medial portion of the cavity and a 1/4" Penrose was looped through and secured with a 0 silk stick tie.    Cavity was copiously irrigated with normal saline and hemostasis was achieved where appropriate.  Sterile dressing was applied.  The patient was awakened from anesthesia and taken back to the floor in stable condition.  All lap and instrument counts were correct x2 prior to and after wound coverage.    Complications: No    Estimated Blood Loss (EBL): Minimal           Implants: * No implants in log *    Specimens:   Culture - thigh abscess            Condition: Good    Disposition: PACU - hemodynamically " stable.        Kayla Foster MD  General Surgery   [phone]

## 2025-02-27 NOTE — ASSESSMENT & PLAN NOTE
2/23 FM:  Starting abx, get US.    2/24:  Increased induration and erythema noted.  Ultrasound pending.  Patient afebrile without leukocytosis.  Continue IV antibiotics.      2/25:  Ultrasound obtained yesterday reveals 12.6 x 6.1 x 3.0 cm complex fluid collection to patient's medial right thigh.  General surgery consulted.  Appreciate recs.  Patient afebrile without leukocytosis.  Continue current IV antibiotic treatment.    2/26:  Patient remains afebrile without leukocytosis.  Continue antibiotic treatment.  General surgery consulted.  Appreciate recs.    2/27:  S/P I&D per general surgery this morning.  Cultures pending.  Continue current IV abx treatment.

## 2025-02-28 LAB
ALBUMIN SERPL BCP-MCNC: 2 G/DL (ref 3.5–5.2)
ALP SERPL-CCNC: 80 U/L (ref 55–135)
ALT SERPL W/O P-5'-P-CCNC: 13 U/L (ref 10–44)
ANION GAP SERPL CALC-SCNC: 10 MMOL/L (ref 8–16)
AST SERPL-CCNC: 41 U/L (ref 10–40)
BASOPHILS # BLD AUTO: 0.08 K/UL (ref 0–0.2)
BASOPHILS NFR BLD: 1.5 % (ref 0–1.9)
BILIRUB SERPL-MCNC: 3 MG/DL (ref 0.1–1)
BUN SERPL-MCNC: 24 MG/DL (ref 8–23)
CALCIUM SERPL-MCNC: 8.3 MG/DL (ref 8.7–10.5)
CHLORIDE SERPL-SCNC: 91 MMOL/L (ref 95–110)
CO2 SERPL-SCNC: 33 MMOL/L (ref 23–29)
CREAT SERPL-MCNC: 1.8 MG/DL (ref 0.5–1.4)
DIFFERENTIAL METHOD BLD: ABNORMAL
EOSINOPHIL # BLD AUTO: 0.1 K/UL (ref 0–0.5)
EOSINOPHIL NFR BLD: 2.1 % (ref 0–8)
ERYTHROCYTE [DISTWIDTH] IN BLOOD BY AUTOMATED COUNT: 18.8 % (ref 11.5–14.5)
EST. GFR  (NO RACE VARIABLE): 39.7 ML/MIN/1.73 M^2
GLUCOSE SERPL-MCNC: 99 MG/DL (ref 70–110)
HCT VFR BLD AUTO: 31.4 % (ref 40–54)
HGB BLD-MCNC: 10.3 G/DL (ref 14–18)
IMM GRANULOCYTES # BLD AUTO: 0.02 K/UL (ref 0–0.04)
IMM GRANULOCYTES NFR BLD AUTO: 0.4 % (ref 0–0.5)
LYMPHOCYTES # BLD AUTO: 1.1 K/UL (ref 1–4.8)
LYMPHOCYTES NFR BLD: 20.2 % (ref 18–48)
MAGNESIUM SERPL-MCNC: 1.4 MG/DL (ref 1.6–2.6)
MCH RBC QN AUTO: 29.9 PG (ref 27–31)
MCHC RBC AUTO-ENTMCNC: 32.8 G/DL (ref 32–36)
MCV RBC AUTO: 91 FL (ref 82–98)
MONOCYTES # BLD AUTO: 1 K/UL (ref 0.3–1)
MONOCYTES NFR BLD: 18.1 % (ref 4–15)
NEUTROPHILS # BLD AUTO: 3.1 K/UL (ref 1.8–7.7)
NEUTROPHILS NFR BLD: 57.7 % (ref 38–73)
NRBC BLD-RTO: 0 /100 WBC
PLATELET # BLD AUTO: 89 K/UL (ref 150–450)
PMV BLD AUTO: 12.7 FL (ref 9.2–12.9)
POTASSIUM SERPL-SCNC: 2.6 MMOL/L (ref 3.5–5.1)
PROT SERPL-MCNC: 6.6 G/DL (ref 6–8.4)
RBC # BLD AUTO: 3.44 M/UL (ref 4.6–6.2)
SODIUM SERPL-SCNC: 134 MMOL/L (ref 136–145)
WBC # BLD AUTO: 5.3 K/UL (ref 3.9–12.7)

## 2025-02-28 PROCEDURE — 63600175 PHARM REV CODE 636 W HCPCS

## 2025-02-28 PROCEDURE — 63600175 PHARM REV CODE 636 W HCPCS: Performed by: INTERNAL MEDICINE

## 2025-02-28 PROCEDURE — 94761 N-INVAS EAR/PLS OXIMETRY MLT: CPT

## 2025-02-28 PROCEDURE — 11000001 HC ACUTE MED/SURG PRIVATE ROOM

## 2025-02-28 PROCEDURE — 83735 ASSAY OF MAGNESIUM: CPT | Performed by: STUDENT IN AN ORGANIZED HEALTH CARE EDUCATION/TRAINING PROGRAM

## 2025-02-28 PROCEDURE — 99900035 HC TECH TIME PER 15 MIN (STAT)

## 2025-02-28 PROCEDURE — 94640 AIRWAY INHALATION TREATMENT: CPT

## 2025-02-28 PROCEDURE — 25000003 PHARM REV CODE 250: Performed by: STUDENT IN AN ORGANIZED HEALTH CARE EDUCATION/TRAINING PROGRAM

## 2025-02-28 PROCEDURE — 97165 OT EVAL LOW COMPLEX 30 MIN: CPT

## 2025-02-28 PROCEDURE — 25000003 PHARM REV CODE 250: Performed by: INTERNAL MEDICINE

## 2025-02-28 PROCEDURE — 99900031 HC PATIENT EDUCATION (STAT)

## 2025-02-28 PROCEDURE — 27000207 HC ISOLATION

## 2025-02-28 PROCEDURE — 99024 POSTOP FOLLOW-UP VISIT: CPT | Mod: ,,, | Performed by: STUDENT IN AN ORGANIZED HEALTH CARE EDUCATION/TRAINING PROGRAM

## 2025-02-28 PROCEDURE — 36415 COLL VENOUS BLD VENIPUNCTURE: CPT | Performed by: STUDENT IN AN ORGANIZED HEALTH CARE EDUCATION/TRAINING PROGRAM

## 2025-02-28 PROCEDURE — 63600175 PHARM REV CODE 636 W HCPCS: Performed by: STUDENT IN AN ORGANIZED HEALTH CARE EDUCATION/TRAINING PROGRAM

## 2025-02-28 PROCEDURE — 85025 COMPLETE CBC W/AUTO DIFF WBC: CPT | Performed by: STUDENT IN AN ORGANIZED HEALTH CARE EDUCATION/TRAINING PROGRAM

## 2025-02-28 PROCEDURE — 80053 COMPREHEN METABOLIC PANEL: CPT | Performed by: STUDENT IN AN ORGANIZED HEALTH CARE EDUCATION/TRAINING PROGRAM

## 2025-02-28 PROCEDURE — 25000242 PHARM REV CODE 250 ALT 637 W/ HCPCS: Performed by: STUDENT IN AN ORGANIZED HEALTH CARE EDUCATION/TRAINING PROGRAM

## 2025-02-28 RX ORDER — FUROSEMIDE 40 MG/1
80 TABLET ORAL 2 TIMES DAILY
Status: DISCONTINUED | OUTPATIENT
Start: 2025-02-28 | End: 2025-03-02

## 2025-02-28 RX ORDER — POTASSIUM CHLORIDE 7.45 MG/ML
10 INJECTION INTRAVENOUS
Status: DISPENSED | OUTPATIENT
Start: 2025-02-28 | End: 2025-02-28

## 2025-02-28 RX ORDER — POTASSIUM CHLORIDE 7.45 MG/ML
10 INJECTION INTRAVENOUS ONCE
Status: COMPLETED | OUTPATIENT
Start: 2025-02-28 | End: 2025-02-28

## 2025-02-28 RX ORDER — MAGNESIUM SULFATE HEPTAHYDRATE 40 MG/ML
2 INJECTION, SOLUTION INTRAVENOUS ONCE
Status: COMPLETED | OUTPATIENT
Start: 2025-02-28 | End: 2025-02-28

## 2025-02-28 RX ADMIN — MIDODRINE HYDROCHLORIDE 5 MG: 5 TABLET ORAL at 06:02

## 2025-02-28 RX ADMIN — TAMSULOSIN HYDROCHLORIDE 0.4 MG: 0.4 CAPSULE ORAL at 09:02

## 2025-02-28 RX ADMIN — CEFTRIAXONE SODIUM 1 G: 1 INJECTION, POWDER, FOR SOLUTION INTRAMUSCULAR; INTRAVENOUS at 10:02

## 2025-02-28 RX ADMIN — DICLOFENAC SODIUM 4 G: 10 GEL TOPICAL at 09:02

## 2025-02-28 RX ADMIN — LACTULOSE 30 G: 20 SOLUTION ORAL at 10:02

## 2025-02-28 RX ADMIN — TRAMADOL HYDROCHLORIDE 50 MG: 50 TABLET, COATED ORAL at 02:02

## 2025-02-28 RX ADMIN — MIDODRINE HYDROCHLORIDE 5 MG: 5 TABLET ORAL at 02:02

## 2025-02-28 RX ADMIN — TRAMADOL HYDROCHLORIDE 50 MG: 50 TABLET, COATED ORAL at 09:02

## 2025-02-28 RX ADMIN — LEVALBUTEROL 1.25 MG: 1.25 SOLUTION, CONCENTRATE RESPIRATORY (INHALATION) at 07:02

## 2025-02-28 RX ADMIN — MAGNESIUM 64 MG (MAGNESIUM CHLORIDE) TABLET,DELAYED RELEASE 128 MG: at 09:02

## 2025-02-28 RX ADMIN — LACTULOSE 30 G: 20 SOLUTION ORAL at 09:02

## 2025-02-28 RX ADMIN — POTASSIUM CHLORIDE 10 MEQ: 7.46 INJECTION, SOLUTION INTRAVENOUS at 12:02

## 2025-02-28 RX ADMIN — POTASSIUM CHLORIDE 10 MEQ: 7.46 INJECTION, SOLUTION INTRAVENOUS at 02:02

## 2025-02-28 RX ADMIN — FUROSEMIDE 120 MG: 40 TABLET ORAL at 09:02

## 2025-02-28 RX ADMIN — DICLOFENAC SODIUM 4 G: 10 GEL TOPICAL at 10:02

## 2025-02-28 RX ADMIN — TRAMADOL HYDROCHLORIDE 50 MG: 50 TABLET, COATED ORAL at 10:02

## 2025-02-28 RX ADMIN — CEFTRIAXONE SODIUM 1 G: 1 INJECTION, POWDER, FOR SOLUTION INTRAMUSCULAR; INTRAVENOUS at 11:02

## 2025-02-28 RX ADMIN — MAGNESIUM SULFATE IN WATER 2 G: 40 INJECTION, SOLUTION INTRAVENOUS at 10:02

## 2025-02-28 RX ADMIN — METOPROLOL TARTRATE 25 MG: 25 TABLET, FILM COATED ORAL at 09:02

## 2025-02-28 RX ADMIN — METOPROLOL TARTRATE 25 MG: 25 TABLET, FILM COATED ORAL at 10:02

## 2025-02-28 RX ADMIN — LEVALBUTEROL 1.25 MG: 1.25 SOLUTION, CONCENTRATE RESPIRATORY (INHALATION) at 03:02

## 2025-02-28 RX ADMIN — Medication 100 MG: at 09:02

## 2025-02-28 RX ADMIN — LEVALBUTEROL 1.25 MG: 1.25 SOLUTION, CONCENTRATE RESPIRATORY (INHALATION) at 11:02

## 2025-02-28 RX ADMIN — FAMOTIDINE 20 MG: 20 TABLET, FILM COATED ORAL at 09:02

## 2025-02-28 RX ADMIN — POTASSIUM CHLORIDE 60 MEQ: 1500 TABLET, EXTENDED RELEASE ORAL at 10:02

## 2025-02-28 RX ADMIN — POTASSIUM CHLORIDE 60 MEQ: 1500 TABLET, EXTENDED RELEASE ORAL at 09:02

## 2025-02-28 RX ADMIN — POTASSIUM CHLORIDE 10 MEQ: 7.46 INJECTION, SOLUTION INTRAVENOUS at 11:02

## 2025-02-28 RX ADMIN — POTASSIUM CHLORIDE 10 MEQ: 7.46 INJECTION, SOLUTION INTRAVENOUS at 09:02

## 2025-02-28 RX ADMIN — MIDODRINE HYDROCHLORIDE 5 MG: 5 TABLET ORAL at 10:02

## 2025-02-28 NOTE — ASSESSMENT & PLAN NOTE
2/23 FM:  Starting abx, get US.    2/24:  Increased induration and erythema noted.  Ultrasound pending.  Patient afebrile without leukocytosis.  Continue IV antibiotics.      2/25:  Ultrasound obtained yesterday reveals 12.6 x 6.1 x 3.0 cm complex fluid collection to patient's medial right thigh.  General surgery consulted.  Appreciate recs.  Patient afebrile without leukocytosis.  Continue current IV antibiotic treatment.    2/26:  Patient remains afebrile without leukocytosis.  Continue antibiotic treatment.  General surgery consulted.  Appreciate recs.    2/27:  S/P I&D per general surgery this morning.  Cultures pending.  Continue current IV abx treatment.    2/28:  Afebrile without leukocytosis.  Cultures pending.  Continue current IV antibiotic treatment.

## 2025-02-28 NOTE — SUBJECTIVE & OBJECTIVE
Past Medical History:   Diagnosis Date    Atrial fibrillation     Bulging disc     Cirrhosis     Colon polyp 2017    Rpt 5 yrs    EV (esophageal varices)     Hypertension 2023    Renal disorder     Skin cancer 2017    Thrombocytopenia        Past Surgical History:   Procedure Laterality Date    CATHETERIZATION OF BOTH LEFT AND RIGHT HEART N/A 2023    Procedure: CATHETERIZATION, HEART, BOTH LEFT AND RIGHT;  Surgeon: Flo Malone MD;  Location: [facility] CATH LAB;  Service: Cardiology;  Laterality: N/A;  low bleeding risk 1.0%    CHOLECYSTECTOMY      COLONOSCOPY      COLONOSCOPY N/A 2017    ta rpt     CORONARY ANGIOGRAPHY N/A 2023    Procedure: ANGIOGRAM, CORONARY ARTERY;  Surgeon: Flo Malone MD;  Location: [facility] CATH LAB;  Service: Cardiology;  Laterality: N/A;    HERNIA REPAIR      SKIN BIOPSY  2017    TONSILLECTOMY      UPPER GASTROINTESTINAL ENDOSCOPY      EV    UPPER GASTROINTESTINAL ENDOSCOPY  2016    VASECTOMY         Review of patient's allergies indicates:  No Known Allergies    No current facility-administered medications on file prior to encounter.     Current Outpatient Medications on File Prior to Encounter   Medication Sig    famotidine (PEPCID) 20 MG tablet TAKE 1 TABLET BY MOUTH EVERY OTHER DAY    furosemide (LASIX) 80 MG tablet Take 80 mg by mouth every Tuesday, Thursday, Saturday, .    lactulose (CHRONULAC) 20 gram/30 mL Soln Take 45 mLs (30 g total) by mouth 3 (three) times daily. TITRATE TO 3-4 BOWEL MOVEMENTS DAILY.    metoprolol tartrate (LOPRESSOR) 25 MG tablet TAKE 0.5 TABLETS BY MOUTH 2 TIMES DAILY.    midodrine (PROAMATINE) 5 MG Tab Take 3 tablets (15 mg total) by mouth every 8 (eight) hours.    potassium chloride (K-TAB) 20 mEq SMARTSI Tablet(s) By Mouth    tamsulosin (FLOMAX) 0.4 mg Cap Take by mouth.    tuberculin,purif.prot.deriv. (TUBERSOL IDRM) Inject 0.1 mLs into the skin.     Family History       Problem Relation (Age of Onset)     Cancer Maternal Uncle    Heart disease Maternal Uncle    Hyperlipidemia Brother    Ovarian cancer Sister          Tobacco Use    Smoking status: Never     Passive exposure: Never    Smokeless tobacco: Never   Substance and Sexual Activity    Alcohol use: Not Currently     Alcohol/week: 0.0 standard drinks of alcohol    Drug use: No    Sexual activity: Not Currently     Review of Systems   Constitutional:  Positive for fatigue. Negative for activity change, appetite change, chills and fever.   HENT:  Negative for ear pain, mouth sores, nosebleeds and sore throat.    Eyes:  Negative for visual disturbance.   Respiratory:  Positive for cough and wheezing. Negative for chest tightness, shortness of breath and stridor.    Cardiovascular:  Positive for leg swelling. Negative for chest pain and palpitations.   Gastrointestinal:  Negative for abdominal distention, abdominal pain, anal bleeding, blood in stool, constipation, diarrhea, nausea, rectal pain and vomiting.   Endocrine: Negative for polyphagia.   Genitourinary:  Positive for difficulty urinating. Negative for dysuria, flank pain and frequency.   Musculoskeletal:  Positive for gait problem. Negative for myalgias.   Skin:  Positive for color change and rash.   Neurological:  Positive for weakness. Negative for dizziness, tremors, seizures, syncope, facial asymmetry, speech difficulty and headaches.   Hematological:  Negative for adenopathy.   Psychiatric/Behavioral:  Positive for confusion. Negative for agitation and hallucinations. The patient is not nervous/anxious.      Objective:     Vital Signs (Most Recent):  Temp: 98 °F (36.7 °C) (02/28/25 0828)  Pulse: 84 (02/28/25 0828)  Resp: 17 (02/28/25 0828)  BP: 114/71 (02/28/25 0828)  SpO2: (!) 94 % (02/28/25 0828) Vital Signs (24h Range):  Temp:  [97.6 °F (36.4 °C)-98.7 °F (37.1 °C)] 98 °F (36.7 °C)  Pulse:  [82-99] 84  Resp:  [17-20] 17  SpO2:  [91 %-99 %] 94 %  BP: (102-117)/(51-71) 114/71     Weight: 131.5 kg (290  lb)  Body mass index is 38.26 kg/m².     Physical Exam  Vitals and nursing note reviewed.   Constitutional:       General: He is not in acute distress.     Appearance: He is obese. He is not ill-appearing, toxic-appearing or diaphoretic.   HENT:      Head: Normocephalic and atraumatic.      Right Ear: External ear normal.      Left Ear: External ear normal.      Nose: Nose normal. No rhinorrhea.      Mouth/Throat:      Mouth: Mucous membranes are moist.      Pharynx: No oropharyngeal exudate or posterior oropharyngeal erythema.   Eyes:      General: Scleral icterus present.      Extraocular Movements: Extraocular movements intact.   Neck:      Vascular: No carotid bruit.   Cardiovascular:      Rate and Rhythm: Normal rate and regular rhythm.      Heart sounds: Normal heart sounds. No murmur heard.     No friction rub. No gallop.   Pulmonary:      Effort: Pulmonary effort is normal. No respiratory distress.      Breath sounds: No stridor. Wheezing and rhonchi present. No rales.   Chest:      Chest wall: No tenderness.   Abdominal:      General: There is no distension.      Palpations: Abdomen is soft. There is no mass.      Tenderness: There is no abdominal tenderness. There is no right CVA tenderness, left CVA tenderness, guarding or rebound.      Hernia: No hernia is present.   Musculoskeletal:         General: No swelling, tenderness or deformity.      Cervical back: No rigidity.      Right lower leg: No edema.      Left lower leg: No edema.   Lymphadenopathy:      Cervical: No cervical adenopathy.   Skin:     General: Skin is warm and dry.      Capillary Refill: Capillary refill takes less than 2 seconds.      Coloration: Skin is jaundiced.      Findings: Erythema and rash present.             Comments: 12 x 10 cm area of erythema and fluctuance to medial right thigh s/p I&D this morning.  ACE wrap in place without drainage.   Neurological:      Mental Status: He is alert. Mental status is at baseline.       Cranial Nerves: No cranial nerve deficit.      Sensory: No sensory deficit.      Motor: Weakness present.      Coordination: Coordination normal.   Psychiatric:         Mood and Affect: Mood normal.         Behavior: Behavior normal.         Thought Content: Thought content normal.         Judgment: Judgment normal.                Significant Labs: All pertinent labs within the past 24 hours have been reviewed.  Recent Lab Results         02/28/25  0549        Albumin 2.0       ALP 80       ALT 13       Anion Gap 10       AST 41       Baso # 0.08       Basophil % 1.5       BILIRUBIN TOTAL 3.0  Comment: For infants and newborns, interpretation of results should be based  on gestational age, weight and in agreement with clinical  observations.    Premature Infant recommended reference ranges:  Up to 24 hours.............<8.0 mg/dL  Up to 48 hours............<12.0 mg/dL  3-5 days..................<15.0 mg/dL  6-29 days.................<15.0 mg/dL         BUN 24       Calcium 8.3       Chloride 91       CO2 33       Creatinine 1.8       Differential Method Automated       eGFR 39.7       Eos # 0.1       Eos % 2.1       Glucose 99       Gran # (ANC) 3.1       Gran % 57.7       Hematocrit 31.4       Hemoglobin 10.3       Immature Grans (Abs) 0.02  Comment: Mild elevation in immature granulocytes is non specific and   can be seen in a variety of conditions including stress response,   acute inflammation, trauma and pregnancy. Correlation with other   laboratory and clinical findings is essential.         Immature Granulocytes 0.4       Lymph # 1.1       Lymph % 20.2       Magnesium  1.4       MCH 29.9       MCHC 32.8       MCV 91       Mono # 1.0       Mono % 18.1       MPV 12.7       nRBC 0       Platelet Count 89       Potassium 2.6  Comment: Potassium critical result(s) called and verbal readback obtained   from Marilyn Abdullahi RN, on Medsurg floor by JWDallas 02/28/2025   07:07         PROTEIN TOTAL 6.6       RBC  3.44       RDW 18.8       Sodium 134       WBC 5.30               Significant Imaging: I have reviewed all pertinent imaging results/findings within the past 24 hours.

## 2025-02-28 NOTE — PLAN OF CARE
Problem: Adult Inpatient Plan of Care  Goal: Readiness for Transition of Care  Outcome: Not Progressing     Problem: Acute Kidney Injury/Impairment  Goal: Effective Renal Function  Outcome: Not Progressing         Problem: Adult Inpatient Plan of Care  Goal: Plan of Care Review  Outcome: Progressing  Goal: Patient-Specific Goal (Individualized)  Outcome: Progressing  Goal: Absence of Hospital-Acquired Illness or Injury  Outcome: Progressing  Goal: Optimal Comfort and Wellbeing  Outcome: Progressing     Problem: Acute Kidney Injury/Impairment  Goal: Fluid and Electrolyte Balance  Outcome: Progressing  Goal: Improved Oral Intake  Outcome: Progressing     Problem: Infection  Goal: Absence of Infection Signs and Symptoms  Outcome: Progressing     Problem: Wound  Goal: Optimal Coping  Outcome: Progressing  Goal: Optimal Functional Ability  Outcome: Progressing  Goal: Absence of Infection Signs and Symptoms  Outcome: Progressing  Goal: Improved Oral Intake  Outcome: Progressing  Goal: Optimal Pain Control and Function  Outcome: Progressing  Goal: Skin Health and Integrity  Outcome: Progressing  Goal: Optimal Wound Healing  Outcome: Progressing

## 2025-02-28 NOTE — SUBJECTIVE & OBJECTIVE
Interval History: NAEON.  Pain improved after drainage.      Medications:  Continuous Infusions:  Scheduled Meds:   cefTRIAXone (Rocephin) IV (PEDS and ADULTS)  1 g Intravenous Q12H    diclofenac sodium  4 g Topical (Top) TID    famotidine  20 mg Oral Daily    furosemide  80 mg Oral BID    lactulose  30 g Oral TID    levalbuterol  1.25 mg Nebulization QID WAKE    magnesium chloride  128 mg Oral Daily    metoprolol tartrate  25 mg Oral BID    midodrine  5 mg Oral Q8H    potassium chloride  60 mEq Oral TID    tamsulosin  0.4 mg Oral Daily    thiamine  100 mg Oral Daily     PRN Meds:  Current Facility-Administered Medications:     melatonin, 6 mg, Oral, Nightly PRN    ondansetron, 4 mg, Intravenous, Q8H PRN    sodium chloride 0.9%, 10 mL, Intravenous, PRN    traMADoL, 50 mg, Oral, Q6H PRN     Review of patient's allergies indicates:  No Known Allergies  Objective:     Vital Signs (Most Recent):  Temp: 97.5 °F (36.4 °C) (02/28/25 1650)  Pulse: 84 (02/28/25 1650)  Resp: 20 (02/28/25 1650)  BP: (!) 106/52 (02/28/25 1650)  SpO2: 98 % (02/28/25 1650) Vital Signs (24h Range):  Temp:  [97.5 °F (36.4 °C)-98.7 °F (37.1 °C)] 97.5 °F (36.4 °C)  Pulse:  [82-99] 84  Resp:  [17-20] 20  SpO2:  [91 %-99 %] 98 %  BP: (100-117)/(51-71) 106/52     Weight: 131.5 kg (290 lb)  Body mass index is 38.26 kg/m².    Intake/Output - Last 3 Shifts         02/26 0700  02/27 0659 02/27 0700 02/28 0659 02/28 0700 03/01 0659    P.O. 170 480     I.V. (mL/kg) 50 (0.4) 93.3 (0.7)     IV Piggyback  407.1     Total Intake(mL/kg) 220 (1.7) 980.4 (7.5)     Urine (mL/kg/hr) 300 (0.1)      Total Output 300      Net -80 +980.4            Urine Occurrence 5 x 2 x     Stool Occurrence 0 x 0 x     Emesis Occurrence 0 x 0 x              Physical Exam  Vitals reviewed.   Constitutional:       General: He is not in acute distress.     Appearance: He is not ill-appearing or toxic-appearing.   Cardiovascular:      Rate and Rhythm: Normal rate and regular rhythm.    Pulmonary:      Effort: Pulmonary effort is normal. No respiratory distress.   Abdominal:      General: There is no distension.      Palpations: Abdomen is soft.      Tenderness: There is no abdominal tenderness. There is no guarding or rebound.   Musculoskeletal:      Right lower leg: No edema.      Left lower leg: No edema.      Comments: Right thigh penrose in place with serosanguinous drainage.  Counter incisions clean and dry without exudate.    Skin:     General: Skin is warm and dry.      Capillary Refill: Capillary refill takes less than 2 seconds.   Neurological:      Mental Status: He is alert. Mental status is at baseline.          Significant Labs:  I have reviewed all pertinent lab results within the past 24 hours.  CBC:   Recent Labs   Lab 02/28/25  0549   WBC 5.30   RBC 3.44*   HGB 10.3*   HCT 31.4*   PLT 89*   MCV 91   MCH 29.9   MCHC 32.8     CMP:   Recent Labs   Lab 02/28/25  0549   GLU 99   CALCIUM 8.3*   ALBUMIN 2.0*   PROT 6.6   *   K 2.6*   CO2 33*   CL 91*   BUN 24*   CREATININE 1.8*   ALKPHOS 80   ALT 13   AST 41*   BILITOT 3.0*       Significant Diagnostics:  I have reviewed all pertinent imaging results/findings within the past 24 hours.

## 2025-02-28 NOTE — ASSESSMENT & PLAN NOTE
Patient has long standing persistent (>12 months) atrial fibrillation. Patient is currently in atrial fibrillation. [id number] Score: 1. The patients heart rate in the last 24 hours is as follows:  Pulse  Min: 82  Max: 99     Antiarrhythmics  metoprolol tartrate (LOPRESSOR) tablet 25 mg, 2 times daily, Oral    Anticoagulants       Plan  - Replete lytes with a goal of K>4, Mg >2  - Patient is anticoagulated, see medications listed above.  - Patient's afib is currently controlled    Auto anticoagulated with CLD.

## 2025-02-28 NOTE — PLAN OF CARE
Problem: Occupational Therapy  Goal: Occupational Therapy Goal  Description: Goals to be met by: 03/07/25     Patient will increase functional independence with ADLs by performing:    LE Dressing with Modified [place].  Toileting from toilet with Modified [place] for hygiene and clothing management.   Bathing from  shower chair/bench with Modified [place].  Toilet transfer to toilet with Modified [place].    Outcome: Established OT POC

## 2025-02-28 NOTE — PLAN OF CARE
[facility] Surg  Discharge Reassessment    Primary Care Provider: Joao Villegas III, MD    Expected Discharge Date:     Reassessment (most recent)       Discharge Reassessment - 02/28/25 1702          Discharge Reassessment    Assessment Type Discharge Planning Reassessment     Did the patient's condition or plan change since previous assessment? Yes     Discharge Plan discussed with: Patient     Discharge Plan A Home     Discharge Plan B Home     DME Needed Upon Discharge  other (see comments)   TBD    Why the patient remains in the hospital Requires continued medical care        Post-Acute Status    Post-Acute Authorization Home Health   The patient reports he is not homebound, so is is not sure if he would like home health    Discharge Delays None known at this time                 Discharge reassessment is completed. The patient and I had a discussion about his discharge plan of care. The patient expressed that he wants to return home upon discharge.  I spoke to him about possible home health, but he expressed that he is not homebound. CM will continue to be available.

## 2025-02-28 NOTE — PROGRESS NOTES
[facility] Surg  General Surgery  Progress Note    Subjective:     History of Present Illness:  68yo male with history of Afib, liver cirrhosis, and ESRD who presents with right thigh abscess with cellulitis.  GS consulted for evaluation.     Post-Op Info:  Procedure(s) (LRB):  Incision and Drainage, thigh (Right)   1 Day Post-Op     Interval History: NAEON.  Pain improved after drainage.      Medications:  Continuous Infusions:  Scheduled Meds:   cefTRIAXone (Rocephin) IV (PEDS and ADULTS)  1 g Intravenous Q12H    diclofenac sodium  4 g Topical (Top) TID    famotidine  20 mg Oral Daily    furosemide  80 mg Oral BID    lactulose  30 g Oral TID    levalbuterol  1.25 mg Nebulization QID WAKE    magnesium chloride  128 mg Oral Daily    metoprolol tartrate  25 mg Oral BID    midodrine  5 mg Oral Q8H    potassium chloride  60 mEq Oral TID    tamsulosin  0.4 mg Oral Daily    thiamine  100 mg Oral Daily     PRN Meds:  Current Facility-Administered Medications:     melatonin, 6 mg, Oral, Nightly PRN    ondansetron, 4 mg, Intravenous, Q8H PRN    sodium chloride 0.9%, 10 mL, Intravenous, PRN    traMADoL, 50 mg, Oral, Q6H PRN     Review of patient's allergies indicates:  No Known Allergies  Objective:     Vital Signs (Most Recent):  Temp: 97.5 °F (36.4 °C) (02/28/25 1650)  Pulse: 84 (02/28/25 1650)  Resp: 20 (02/28/25 1650)  BP: (!) 106/52 (02/28/25 1650)  SpO2: 98 % (02/28/25 1650) Vital Signs (24h Range):  Temp:  [97.5 °F (36.4 °C)-98.7 °F (37.1 °C)] 97.5 °F (36.4 °C)  Pulse:  [82-99] 84  Resp:  [17-20] 20  SpO2:  [91 %-99 %] 98 %  BP: (100-117)/(51-71) 106/52     Weight: 131.5 kg (290 lb)  Body mass index is 38.26 kg/m².    Intake/Output - Last 3 Shifts         02/26 0700 02/27 0659 02/27 0700 02/28 0659 02/28 0700 03/01 0659    P.O. 170 480     I.V. (mL/kg) 50 (0.4) 93.3 (0.7)     IV Piggyback  407.1     Total Intake(mL/kg) 220 (1.7) 980.4 (7.5)     Urine (mL/kg/hr) 300 (0.1)      Total Output 300      Net -80  +980.4            Urine Occurrence 5 x 2 x     Stool Occurrence 0 x 0 x     Emesis Occurrence 0 x 0 x              Physical Exam  Vitals reviewed.   Constitutional:       General: He is not in acute distress.     Appearance: He is not ill-appearing or toxic-appearing.   Cardiovascular:      Rate and Rhythm: Normal rate and regular rhythm.   Pulmonary:      Effort: Pulmonary effort is normal. No respiratory distress.   Abdominal:      General: There is no distension.      Palpations: Abdomen is soft.      Tenderness: There is no abdominal tenderness. There is no guarding or rebound.   Musculoskeletal:      Right lower leg: No edema.      Left lower leg: No edema.      Comments: Right thigh penrose in place with serosanguinous drainage.  Counter incisions clean and dry without exudate.    Skin:     General: Skin is warm and dry.      Capillary Refill: Capillary refill takes less than 2 seconds.   Neurological:      Mental Status: He is alert. Mental status is at baseline.          Significant Labs:  I have reviewed all pertinent lab results within the past 24 hours.  CBC:   Recent Labs   Lab 02/28/25  0549   WBC 5.30   RBC 3.44*   HGB 10.3*   HCT 31.4*   PLT 89*   MCV 91   MCH 29.9   MCHC 32.8     CMP:   Recent Labs   Lab 02/28/25  0549   GLU 99   CALCIUM 8.3*   ALBUMIN 2.0*   PROT 6.6   *   K 2.6*   CO2 33*   CL 91*   BUN 24*   CREATININE 1.8*   ALKPHOS 80   ALT 13   AST 41*   BILITOT 3.0*       Significant Diagnostics:  I have reviewed all pertinent imaging results/findings within the past 24 hours.  Assessment/Plan:     Cellulitis of right lower extremity  POD #1 s/p I&D of right thigh wound   -Serous fluid in cavity indicating old infected hematoma   -Cleanse wound daily and cover with ABD pad + kerlix + ACE   -Continue IV abx per medicine - may discontinue upon discharge   -OR cultures pending   -OK to ambulate ad keven   -Rest of care per primary         Kayla Foster MD  General Surgery  [facility]  Surg

## 2025-02-28 NOTE — ASSESSMENT & PLAN NOTE
Patient has Abnormal Magnesium: hypomagnesemia. Will continue to monitor electrolytes closely. Will replace the affected electrolytes and repeat labs to be done after interventions completed. The patient's magnesium results have been reviewed and are listed below.  Recent Labs   Lab 02/28/25  0549   MG 1.4*

## 2025-02-28 NOTE — PLAN OF CARE
POC reviewed with pt, VSS, IVF's infusing per MD orders without complications. IV site clean/Dry/Intact. Pt denies pain/needs at this time,CB in reach, bed in lowest position, lighting adjusted to pt's preference will continue to monitor.

## 2025-02-28 NOTE — ASSESSMENT & PLAN NOTE
Patient's most recent potassium results are listed below.   Recent Labs     02/26/25  0529 02/27/25  0603 02/28/25  0549   K 2.9* 2.8* 2.6*       Plan  - Replete potassium per protocol  - Monitor potassium Daily  - Patient's hypokalemia is stable  - Nephrology following.  Appreciate recs.

## 2025-02-28 NOTE — ASSESSMENT & PLAN NOTE
POD #1 s/p I&D of right thigh wound   -Serous fluid in cavity indicating old infected hematoma   -Cleanse wound daily and cover with ABD pad + kerlix + ACE   -Continue IV abx per medicine - may discontinue upon discharge   -OR cultures pending   -OK to ambulate ad keven   -Rest of care per primary

## 2025-02-28 NOTE — PT/OT/SLP PROGRESS
Occupational Therapy   Treatment    Name: Juan Carlos Yoo Sr.  MRN: [id number]  Admitting Diagnosis:  Rhinovirus infection  1 Day Post-Op    Recommendations:     Discharge Recommendations: Low Intensity Therapy  Discharge Equipment Recommendations:  none  Barriers to discharge:  Other (Comment) (Medical status)    Assessment:     Juan Carlos Yoo Sr. is a 69 y.o. male with a medical diagnosis of Rhinovirus infection.  He presents with functional deficits impacting independence with ADL's including functional mobility. Performance deficits affecting function are weakness, impaired endurance, impaired self care skills, impaired functional mobility, impaired balance, decreased upper extremity function, decreased safety awareness, pain, decreased ROM, impaired cardiopulmonary response to activity, impaired joint extensibility, impaired muscle length.     Rehab Prognosis:  Good; patient would benefit from acute skilled OT services to address these deficits and reach maximum level of function.       Plan:     Patient to be seen 4 x/week to address the above listed problems via self-care/home management, therapeutic activities, therapeutic exercises  Plan of Care Expires: 03/07/25  Plan of Care Reviewed with: patient    Subjective     Chief Complaint: urination frequency  Patient/Family Comments/goals: Pt would like to return home.   Pain/Comfort:  Pain Rating 1: 7/10  Location - Side 1: Right  Location - Orientation 1: medial  Location 1: thigh  Pain Addressed 1: Reposition, Distraction, Nurse notified  Pain Rating Post-Intervention 1: 6/10    Objective:     Communicated with: nurse prior to session.  Patient found HOB elevated with telemetry, peripheral IV upon OT entry to room.    General Precautions: Standard, droplet    Orthopedic Precautions:N/A  Braces: N/A  Respiratory Status: Room air     Occupational Performance:     Bed Mobility:    Patient completed Rolling/Turning to Left with  modified independence  Patient completed  Rolling/Turning to Right with modified independence  Patient completed Scooting/Bridging with modified independence  Patient completed Supine to Sit with modified independence  Patient completed Sit to Supine with modified independence     Functional Mobility/Transfers:  Patient completed Sit <> Stand Transfer with independence  with  no assistive device   Patient completed Toilet Transfer Step Transfer technique with supervision with  grab bars  Functional Mobility: Pt ambulated 26' between surfaces utilizing IV pole without LOB.    Activities of Daily Living:  Feeding:  independence    Grooming: modified independence    Bathing: supervision    Upper Body Dressing: modified independence    Lower Body Dressing: supervision    Toileting: supervision        [facility] 6 Click ADL: 21    Treatment & Education:  Pt was provided education / instruction regarding role of OT and established OT POC.    Patient left HOB elevated with all lines intact, call button in reach, and nurse notified    GOALS:   Goals to be met by: 03/07/25     Patient will increase functional independence with ADLs by performing:    LE Dressing with Modified [place].  Toileting from toilet with Modified [place] for hygiene and clothing management.   Bathing from  shower chair/bench with Modified [place].  Toilet transfer to toilet with Modified [place].      DME Justifications:  No DME recommended requiring DME justifications    Time Tracking:     OT Date of Treatment: 02/28/25  OT Start Time: 1331  OT Stop Time: 1359  OT Total Time (min): 28 min    Billable Minutes:Evaluation 28    OT/EDWARDO: OT     Number of EDWARDO visits since last OT visit: 1 2/28/2025

## 2025-02-28 NOTE — NURSING
Incontinent pad is slightly bloody from right leg drainage.  Pt said it did that after he went to the bathroom.  Reinforced with an ABD and Kerlix, taped in place.

## 2025-02-28 NOTE — PROGRESS NOTES
"Physical Therapy Treatment    Patient Name:  Juan Carlos Yoo Sr.   MRN:  [id number]    02/28/2025    Patient was evaluated by Kvng Bhatt P.T., DPT on 2/25/2025.  He documented " Juan Carlos Yoo Sr. is a 69 y.o. male admitted with a medical diagnosis of Rhinovirus infection.  He presents with the following impairments/functional limitations: weakness, impaired endurance, impaired self care skills, impaired functional mobility, pain, impaired cardiopulmonary response to activity, edema, impaired skin. Although these limitations are causing decreased functional mobility and independence, patient reports that it is his PLOF and that he does not need PT while in the hospital. He reports that his son lives nearby to assist him if needed. ". "Patient already at [facility] and refused further acute physical therapy services at this time. I "    New P.T. order/consult  received today, 2/28/2025.  Secure chat sent to  Pilar Melgar NP and informed her of P.T. findings from 2/25/2025 and OT notes dated 2/28/2025.  P.T. to discharge P.T. orders at this time due to no change in functional status from base line. Re-consult as appropriate.   "

## 2025-03-01 LAB
ALBUMIN SERPL BCP-MCNC: 1.8 G/DL (ref 3.5–5.2)
ALP SERPL-CCNC: 83 U/L (ref 55–135)
ALT SERPL W/O P-5'-P-CCNC: 14 U/L (ref 10–44)
ANION GAP SERPL CALC-SCNC: 9 MMOL/L (ref 8–16)
AST SERPL-CCNC: 49 U/L (ref 10–40)
BASOPHILS # BLD AUTO: 0.07 K/UL (ref 0–0.2)
BASOPHILS NFR BLD: 1.3 % (ref 0–1.9)
BILIRUB SERPL-MCNC: 2.4 MG/DL (ref 0.1–1)
BUN SERPL-MCNC: 24 MG/DL (ref 8–23)
CALCIUM SERPL-MCNC: 8.3 MG/DL (ref 8.7–10.5)
CHLORIDE SERPL-SCNC: 92 MMOL/L (ref 95–110)
CO2 SERPL-SCNC: 34 MMOL/L (ref 23–29)
CREAT SERPL-MCNC: 1.8 MG/DL (ref 0.5–1.4)
DIFFERENTIAL METHOD BLD: ABNORMAL
EOSINOPHIL # BLD AUTO: 0.1 K/UL (ref 0–0.5)
EOSINOPHIL NFR BLD: 2.3 % (ref 0–8)
ERYTHROCYTE [DISTWIDTH] IN BLOOD BY AUTOMATED COUNT: 18.8 % (ref 11.5–14.5)
EST. GFR  (NO RACE VARIABLE): 39.7 ML/MIN/1.73 M^2
GLUCOSE SERPL-MCNC: 84 MG/DL (ref 70–110)
HCT VFR BLD AUTO: 30.1 % (ref 40–54)
HGB BLD-MCNC: 9.8 G/DL (ref 14–18)
IMM GRANULOCYTES # BLD AUTO: 0.05 K/UL (ref 0–0.04)
IMM GRANULOCYTES NFR BLD AUTO: 0.9 % (ref 0–0.5)
LYMPHOCYTES # BLD AUTO: 1.1 K/UL (ref 1–4.8)
LYMPHOCYTES NFR BLD: 20.9 % (ref 18–48)
MAGNESIUM SERPL-MCNC: 1.5 MG/DL (ref 1.6–2.6)
MCH RBC QN AUTO: 29.8 PG (ref 27–31)
MCHC RBC AUTO-ENTMCNC: 32.6 G/DL (ref 32–36)
MCV RBC AUTO: 92 FL (ref 82–98)
MONOCYTES # BLD AUTO: 0.9 K/UL (ref 0.3–1)
MONOCYTES NFR BLD: 16.3 % (ref 4–15)
NEUTROPHILS # BLD AUTO: 3.1 K/UL (ref 1.8–7.7)
NEUTROPHILS NFR BLD: 58.3 % (ref 38–73)
NRBC BLD-RTO: 0 /100 WBC
PLATELET # BLD AUTO: 101 K/UL (ref 150–450)
PMV BLD AUTO: 11.8 FL (ref 9.2–12.9)
POTASSIUM SERPL-SCNC: 2.7 MMOL/L (ref 3.5–5.1)
PROT SERPL-MCNC: 6.1 G/DL (ref 6–8.4)
RBC # BLD AUTO: 3.29 M/UL (ref 4.6–6.2)
SODIUM SERPL-SCNC: 135 MMOL/L (ref 136–145)
WBC # BLD AUTO: 5.27 K/UL (ref 3.9–12.7)

## 2025-03-01 PROCEDURE — 11000001 HC ACUTE MED/SURG PRIVATE ROOM

## 2025-03-01 PROCEDURE — 63600175 PHARM REV CODE 636 W HCPCS: Performed by: INTERNAL MEDICINE

## 2025-03-01 PROCEDURE — 36415 COLL VENOUS BLD VENIPUNCTURE: CPT | Performed by: STUDENT IN AN ORGANIZED HEALTH CARE EDUCATION/TRAINING PROGRAM

## 2025-03-01 PROCEDURE — 25000003 PHARM REV CODE 250: Performed by: STUDENT IN AN ORGANIZED HEALTH CARE EDUCATION/TRAINING PROGRAM

## 2025-03-01 PROCEDURE — 25000242 PHARM REV CODE 250 ALT 637 W/ HCPCS: Performed by: STUDENT IN AN ORGANIZED HEALTH CARE EDUCATION/TRAINING PROGRAM

## 2025-03-01 PROCEDURE — 25000003 PHARM REV CODE 250: Performed by: INTERNAL MEDICINE

## 2025-03-01 PROCEDURE — 80053 COMPREHEN METABOLIC PANEL: CPT | Performed by: STUDENT IN AN ORGANIZED HEALTH CARE EDUCATION/TRAINING PROGRAM

## 2025-03-01 PROCEDURE — 94640 AIRWAY INHALATION TREATMENT: CPT

## 2025-03-01 PROCEDURE — 83735 ASSAY OF MAGNESIUM: CPT | Performed by: STUDENT IN AN ORGANIZED HEALTH CARE EDUCATION/TRAINING PROGRAM

## 2025-03-01 PROCEDURE — 99024 POSTOP FOLLOW-UP VISIT: CPT | Mod: ,,,

## 2025-03-01 PROCEDURE — 27000207 HC ISOLATION

## 2025-03-01 PROCEDURE — 85025 COMPLETE CBC W/AUTO DIFF WBC: CPT | Performed by: STUDENT IN AN ORGANIZED HEALTH CARE EDUCATION/TRAINING PROGRAM

## 2025-03-01 PROCEDURE — 99900035 HC TECH TIME PER 15 MIN (STAT)

## 2025-03-01 PROCEDURE — 94761 N-INVAS EAR/PLS OXIMETRY MLT: CPT

## 2025-03-01 PROCEDURE — 63600175 PHARM REV CODE 636 W HCPCS: Performed by: STUDENT IN AN ORGANIZED HEALTH CARE EDUCATION/TRAINING PROGRAM

## 2025-03-01 PROCEDURE — 99900031 HC PATIENT EDUCATION (STAT)

## 2025-03-01 RX ORDER — POTASSIUM CHLORIDE 7.45 MG/ML
10 INJECTION INTRAVENOUS
Status: COMPLETED | OUTPATIENT
Start: 2025-03-01 | End: 2025-03-01

## 2025-03-01 RX ADMIN — TRAMADOL HYDROCHLORIDE 50 MG: 50 TABLET, COATED ORAL at 09:03

## 2025-03-01 RX ADMIN — MIDODRINE HYDROCHLORIDE 5 MG: 5 TABLET ORAL at 09:03

## 2025-03-01 RX ADMIN — MIDODRINE HYDROCHLORIDE 5 MG: 5 TABLET ORAL at 06:03

## 2025-03-01 RX ADMIN — POTASSIUM CHLORIDE 10 MEQ: 7.46 INJECTION, SOLUTION INTRAVENOUS at 04:03

## 2025-03-01 RX ADMIN — CEFTRIAXONE SODIUM 1 G: 1 INJECTION, POWDER, FOR SOLUTION INTRAMUSCULAR; INTRAVENOUS at 10:03

## 2025-03-01 RX ADMIN — DICLOFENAC SODIUM 4 G: 10 GEL TOPICAL at 09:03

## 2025-03-01 RX ADMIN — TAMSULOSIN HYDROCHLORIDE 0.4 MG: 0.4 CAPSULE ORAL at 09:03

## 2025-03-01 RX ADMIN — FUROSEMIDE 80 MG: 40 TABLET ORAL at 05:03

## 2025-03-01 RX ADMIN — MAGNESIUM 64 MG (MAGNESIUM CHLORIDE) TABLET,DELAYED RELEASE 128 MG: at 09:03

## 2025-03-01 RX ADMIN — LACTULOSE 30 G: 20 SOLUTION ORAL at 09:03

## 2025-03-01 RX ADMIN — LACTULOSE 30 G: 20 SOLUTION ORAL at 02:03

## 2025-03-01 RX ADMIN — MIDODRINE HYDROCHLORIDE 5 MG: 5 TABLET ORAL at 02:03

## 2025-03-01 RX ADMIN — FAMOTIDINE 20 MG: 20 TABLET, FILM COATED ORAL at 09:03

## 2025-03-01 RX ADMIN — Medication 100 MG: at 09:03

## 2025-03-01 RX ADMIN — LEVALBUTEROL 1.25 MG: 1.25 SOLUTION, CONCENTRATE RESPIRATORY (INHALATION) at 07:03

## 2025-03-01 RX ADMIN — POTASSIUM CHLORIDE 10 MEQ: 7.46 INJECTION, SOLUTION INTRAVENOUS at 01:03

## 2025-03-01 RX ADMIN — LEVALBUTEROL 1.25 MG: 1.25 SOLUTION, CONCENTRATE RESPIRATORY (INHALATION) at 03:03

## 2025-03-01 RX ADMIN — TRAMADOL HYDROCHLORIDE 50 MG: 50 TABLET, COATED ORAL at 12:03

## 2025-03-01 RX ADMIN — POTASSIUM BICARBONATE 25 MEQ: 978 TABLET, EFFERVESCENT ORAL at 02:03

## 2025-03-01 RX ADMIN — POTASSIUM BICARBONATE 25 MEQ: 978 TABLET, EFFERVESCENT ORAL at 09:03

## 2025-03-01 RX ADMIN — POTASSIUM CHLORIDE 10 MEQ: 7.46 INJECTION, SOLUTION INTRAVENOUS at 10:03

## 2025-03-01 RX ADMIN — POTASSIUM CHLORIDE 10 MEQ: 7.46 INJECTION, SOLUTION INTRAVENOUS at 02:03

## 2025-03-01 RX ADMIN — METOPROLOL TARTRATE 25 MG: 25 TABLET, FILM COATED ORAL at 09:03

## 2025-03-01 RX ADMIN — POTASSIUM BICARBONATE 25 MEQ: 978 TABLET, EFFERVESCENT ORAL at 10:03

## 2025-03-01 RX ADMIN — FUROSEMIDE 80 MG: 40 TABLET ORAL at 09:03

## 2025-03-01 NOTE — HOSPITAL COURSE
3/1 CP: POD#2 s/p I&D of right thigh wound. Pain controlled. Penrose in place. Serous drainage noted. OR cultures negative and in progress.   3/3 CP: POD#4 s/p I&D of right thigh wound. Penrose in place. Serous drainage noted to dressing. OR cultures with no growth. No leukocytosis.

## 2025-03-01 NOTE — SUBJECTIVE & OBJECTIVE
Past Medical History:   Diagnosis Date    Atrial fibrillation     Bulging disc     Cirrhosis     Colon polyp 12/29/2017    Rpt 5 yrs    EV (esophageal varices)     Hypertension 03/30/2023    Renal disorder     Skin cancer 03/2017    Thrombocytopenia        Past Surgical History:   Procedure Laterality Date    CATHETERIZATION OF BOTH LEFT AND RIGHT HEART N/A 2/8/2023    Procedure: CATHETERIZATION, HEART, BOTH LEFT AND RIGHT;  Surgeon: Flo Malone MD;  Location: [facility] CATH LAB;  Service: Cardiology;  Laterality: N/A;  low bleeding risk 1.0%    CHOLECYSTECTOMY      COLONOSCOPY      COLONOSCOPY N/A 12/29/2017    ta rpt 2022    CORONARY ANGIOGRAPHY N/A 2/8/2023    Procedure: ANGIOGRAM, CORONARY ARTERY;  Surgeon: Flo Malone MD;  Location: [facility] CATH LAB;  Service: Cardiology;  Laterality: N/A;    HERNIA REPAIR      INCISION AND DRAINAGE Right 2/27/2025    Procedure: Incision and Drainage, thigh;  Surgeon: Kayla Foster MD;  Location: [facility];  Service: General;  Laterality: Right;  Plan to go first if pt has cardiac clearance - SB    SKIN BIOPSY  03/2017    TONSILLECTOMY      UPPER GASTROINTESTINAL ENDOSCOPY  2011    EV    UPPER GASTROINTESTINAL ENDOSCOPY  2016    VASECTOMY         Review of patient's allergies indicates:  No Known Allergies    No current facility-administered medications on file prior to encounter.     Current Outpatient Medications on File Prior to Encounter   Medication Sig    famotidine (PEPCID) 20 MG tablet TAKE 1 TABLET BY MOUTH EVERY OTHER DAY    furosemide (LASIX) 80 MG tablet Take 80 mg by mouth every Tuesday, Thursday, Saturday, Sunday.    lactulose (CHRONULAC) 20 gram/30 mL Soln Take 45 mLs (30 g total) by mouth 3 (three) times daily. TITRATE TO 3-4 BOWEL MOVEMENTS DAILY.    metoprolol tartrate (LOPRESSOR) 25 MG tablet TAKE 0.5 TABLETS BY MOUTH 2 TIMES DAILY.    midodrine (PROAMATINE) 5 MG Tab Take 3 tablets (15 mg total) by mouth every 8 (eight) hours.    potassium chloride  (K-TAB) 20 mEq SMARTSI Tablet(s) By Mouth    tamsulosin (FLOMAX) 0.4 mg Cap Take by mouth.    tuberculin,purif.prot.deriv. (TUBERSOL IDRM) Inject 0.1 mLs into the skin.     Family History       Problem Relation (Age of Onset)    Cancer Maternal Uncle    Heart disease Maternal Uncle    Hyperlipidemia Brother    Ovarian cancer Sister          Tobacco Use    Smoking status: Never     Passive exposure: Never    Smokeless tobacco: Never   Substance and Sexual Activity    Alcohol use: Not Currently     Alcohol/week: 0.0 standard drinks of alcohol    Drug use: No    Sexual activity: Not Currently     Review of Systems   Constitutional:  Positive for fatigue. Negative for activity change, appetite change, chills and fever.   HENT:  Negative for ear pain, mouth sores, nosebleeds and sore throat.    Eyes:  Negative for visual disturbance.   Respiratory:  Positive for cough and wheezing. Negative for chest tightness, shortness of breath and stridor.    Cardiovascular:  Positive for leg swelling. Negative for chest pain and palpitations.   Gastrointestinal:  Negative for abdominal distention, abdominal pain, anal bleeding, blood in stool, constipation, diarrhea, nausea, rectal pain and vomiting.   Endocrine: Negative for polyphagia.   Genitourinary:  Positive for difficulty urinating. Negative for dysuria, flank pain and frequency.   Musculoskeletal:  Positive for gait problem. Negative for myalgias.   Skin:  Positive for color change and rash.   Neurological:  Positive for weakness. Negative for dizziness, tremors, seizures, syncope, facial asymmetry, speech difficulty and headaches.   Hematological:  Negative for adenopathy.   Psychiatric/Behavioral:  Positive for confusion. Negative for agitation and hallucinations. The patient is not nervous/anxious.      Objective:     Vital Signs (Most Recent):  Temp: 97.6 °F (36.4 °C) (25 0330)  Pulse: 84 (25 0915)  Resp: 18 (25 0733)  BP: 101/62 (25  0915)  SpO2: 95 % (03/01/25 0733) Vital Signs (24h Range):  Temp:  [97.3 °F (36.3 °C)-98 °F (36.7 °C)] 97.6 °F (36.4 °C)  Pulse:  [82-94] 84  Resp:  [18-20] 18  SpO2:  [95 %-99 %] 95 %  BP: (100-125)/(52-63) 101/62     Weight: 131.5 kg (290 lb)  Body mass index is 38.26 kg/m².     Physical Exam  Vitals and nursing note reviewed.   Constitutional:       General: He is not in acute distress.     Appearance: He is obese. He is not ill-appearing, toxic-appearing or diaphoretic.   HENT:      Head: Normocephalic and atraumatic.      Right Ear: External ear normal.      Left Ear: External ear normal.      Nose: Nose normal. No rhinorrhea.      Mouth/Throat:      Mouth: Mucous membranes are moist.      Pharynx: No oropharyngeal exudate or posterior oropharyngeal erythema.   Eyes:      General: Scleral icterus present.      Extraocular Movements: Extraocular movements intact.   Neck:      Vascular: No carotid bruit.   Cardiovascular:      Rate and Rhythm: Normal rate and regular rhythm.      Heart sounds: Normal heart sounds. No murmur heard.     No friction rub. No gallop.   Pulmonary:      Effort: Pulmonary effort is normal. No respiratory distress.      Breath sounds: No stridor. Wheezing and rhonchi present. No rales.   Chest:      Chest wall: No tenderness.   Abdominal:      General: There is no distension.      Palpations: Abdomen is soft. There is no mass.      Tenderness: There is no abdominal tenderness. There is no right CVA tenderness, left CVA tenderness, guarding or rebound.      Hernia: No hernia is present.   Musculoskeletal:         General: No swelling, tenderness or deformity.      Cervical back: No rigidity.      Right lower leg: No edema.      Left lower leg: No edema.      Comments: Right thigh penrose in place with serosanguinous drainage.  Counter incisions clean and dry without exudate.    Lymphadenopathy:      Cervical: No cervical adenopathy.   Skin:     General: Skin is warm and dry.      Capillary  Refill: Capillary refill takes less than 2 seconds.      Coloration: Skin is jaundiced.      Findings: Erythema and rash present.             Comments: 12 x 10 cm area of erythema and fluctuance to medial right thigh s/p I&D this morning.  ACE wrap in place without drainage.   Neurological:      Mental Status: He is alert. Mental status is at baseline.      Cranial Nerves: No cranial nerve deficit.      Sensory: No sensory deficit.      Motor: Weakness present.      Coordination: Coordination normal.   Psychiatric:         Mood and Affect: Mood normal.         Behavior: Behavior normal.         Thought Content: Thought content normal.         Judgment: Judgment normal.                Significant Labs: All pertinent labs within the past 24 hours have been reviewed.  Recent Lab Results         03/01/25  0553        Albumin 1.8       ALP 83       ALT 14       Anion Gap 9       AST 49       Baso # 0.07       Basophil % 1.3       BILIRUBIN TOTAL 2.4  Comment: For infants and newborns, interpretation of results should be based  on gestational age, weight and in agreement with clinical  observations.    Premature Infant recommended reference ranges:  Up to 24 hours.............<8.0 mg/dL  Up to 48 hours............<12.0 mg/dL  3-5 days..................<15.0 mg/dL  6-29 days.................<15.0 mg/dL         BUN 24       Calcium 8.3       Chloride 92       CO2 34       Creatinine 1.8       Differential Method Automated       eGFR 39.7       Eos # 0.1       Eos % 2.3       Glucose 84       Gran # (ANC) 3.1       Gran % 58.3       Hematocrit 30.1       Hemoglobin 9.8       Immature Grans (Abs) 0.05  Comment: Mild elevation in immature granulocytes is non specific and   can be seen in a variety of conditions including stress response,   acute inflammation, trauma and pregnancy. Correlation with other   laboratory and clinical findings is essential.         Immature Granulocytes 0.9       Lymph # 1.1       Lymph % 20.9        Magnesium  1.5       MCH 29.8       MCHC 32.6       MCV 92       Mono # 0.9       Mono % 16.3       MPV 11.8       nRBC 0       Platelet Count 101       Potassium 2.7  Comment: Potassium critical result(s) called and verbal readback obtained from   Abran Zayas LPN by [facility] 03/01/2025 08:23         PROTEIN TOTAL 6.1       RBC 3.29       RDW 18.8       Sodium 135       WBC 5.27               Significant Imaging: I have reviewed all pertinent imaging results/findings within the past 24 hours.

## 2025-03-01 NOTE — ASSESSMENT & PLAN NOTE
2/23 FM:  Starting abx, get US.    2/24:  Increased induration and erythema noted.  Ultrasound pending.  Patient afebrile without leukocytosis.  Continue IV antibiotics.      2/25:  Ultrasound obtained yesterday reveals 12.6 x 6.1 x 3.0 cm complex fluid collection to patient's medial right thigh.  General surgery consulted.  Appreciate recs.  Patient afebrile without leukocytosis.  Continue current IV antibiotic treatment.    2/26:  Patient remains afebrile without leukocytosis.  Continue antibiotic treatment.  General surgery consulted.  Appreciate recs.    2/27:  S/P I&D per general surgery this morning.  Cultures pending.  Continue current IV abx treatment.    2/28:  Afebrile without leukocytosis.  Cultures pending.  Continue current IV antibiotic treatment.    3/1:  Patient afebrile without leukocytosis.  Cultures pending.  Continue current IV antibiotic treatment.  Pain controlled.

## 2025-03-01 NOTE — PLAN OF CARE
Plan of care reviewed with patient. Peripheral IV in place and saline locked. Pt tolerated meds well. PRN pain meds given per MD orders with relief obtained. VSS. NADN. RLE dressings CDI. Site care completed this shift; pt tolerated well. Pt free from falls and injury. Questions and concerns addressed. Pt belongings and call bell within reach.   Problem: Adult Inpatient Plan of Care  Goal: Plan of Care Review  Outcome: Progressing  Goal: Patient-Specific Goal (Individualized)  Outcome: Progressing  Goal: Absence of Hospital-Acquired Illness or Injury  Outcome: Progressing  Goal: Optimal Comfort and Wellbeing  Outcome: Progressing  Goal: Readiness for Transition of Care  Outcome: Progressing     Problem: Acute Kidney Injury/Impairment  Goal: Fluid and Electrolyte Balance  Outcome: Progressing  Goal: Improved Oral Intake  Outcome: Progressing  Goal: Effective Renal Function  Outcome: Progressing     Problem: Infection  Goal: Absence of Infection Signs and Symptoms  Outcome: Progressing     Problem: Wound  Goal: Optimal Coping  Outcome: Progressing  Goal: Optimal Functional Ability  Outcome: Progressing  Goal: Absence of Infection Signs and Symptoms  Outcome: Progressing  Goal: Improved Oral Intake  Outcome: Progressing  Goal: Optimal Pain Control and Function  Outcome: Progressing  Goal: Skin Health and Integrity  Outcome: Progressing  Goal: Optimal Wound Healing  Outcome: Progressing

## 2025-03-01 NOTE — ASSESSMENT & PLAN NOTE
Patient has Abnormal Magnesium: hypomagnesemia. Will continue to monitor electrolytes closely. Will replace the affected electrolytes and repeat labs to be done after interventions completed. The patient's magnesium results have been reviewed and are listed below.  Recent Labs   Lab 03/01/25  0553   MG 1.5*

## 2025-03-01 NOTE — PROGRESS NOTES
[facility]  General Surgery  Progress Note    Subjective:     History of Present Illness:  70yo male with history of Afib, liver cirrhosis, and ESRD who presents with right thigh abscess with cellulitis.  GS consulted for evaluation.     Hospital Course:  3/1 CP: POD#2 s/p I&D of right thigh wound. Pain controlled. Penrose in place. Serous drainage noted. OR cultures negative and in progress.     Post-Op Info:  Procedure(s) (LRB):  Incision and Drainage, thigh (Right)   2 Days Post-Op     Interval History: NAEON.  Pain improved after drainage.      Medications:  Continuous Infusions:  Scheduled Meds:   cefTRIAXone (Rocephin) IV (PEDS and ADULTS)  1 g Intravenous Q12H    diclofenac sodium  4 g Topical (Top) TID    famotidine  20 mg Oral Daily    furosemide  80 mg Oral BID    lactulose  30 g Oral TID    levalbuterol  1.25 mg Nebulization QID WAKE    magnesium chloride  128 mg Oral Daily    metoprolol tartrate  25 mg Oral BID    midodrine  5 mg Oral Q8H    potassium bicarbonate  25 mEq Oral TID    potassium chloride  10 mEq Intravenous Q1H    tamsulosin  0.4 mg Oral Daily    thiamine  100 mg Oral Daily     PRN Meds:  Current Facility-Administered Medications:     melatonin, 6 mg, Oral, Nightly PRN    ondansetron, 4 mg, Intravenous, Q8H PRN    sodium chloride 0.9%, 10 mL, Intravenous, PRN    traMADoL, 50 mg, Oral, Q6H PRN     Review of patient's allergies indicates:  No Known Allergies  Objective:     Vital Signs (Most Recent):  Temp: 97.6 °F (36.4 °C) (03/01/25 0330)  Pulse: 84 (03/01/25 0915)  Resp: 18 (03/01/25 0733)  BP: 101/62 (03/01/25 0915)  SpO2: 95 % (03/01/25 0733) Vital Signs (24h Range):  Temp:  [97.3 °F (36.3 °C)-98 °F (36.7 °C)] 97.6 °F (36.4 °C)  Pulse:  [82-94] 84  Resp:  [18-20] 18  SpO2:  [95 %-99 %] 95 %  BP: (100-125)/(52-63) 101/62     Weight: 131.5 kg (290 lb)  Body mass index is 38.26 kg/m².    Intake/Output - Last 3 Shifts         02/27 0700 02/28 0659 02/28 0700 03/01 0659 03/01  0700  03/02 0659    P.O. 480 480     I.V. (mL/kg) 93.3 (0.7)      IV Piggyback 407.1      Total Intake(mL/kg) 980.4 (7.5) 480 (3.7)     Urine (mL/kg/hr)  0 (0)     Stool  0     Total Output  0     Net +980.4 +480            Urine Occurrence 2 x 4 x     Stool Occurrence 0 x 0 x     Emesis Occurrence 0 x               Physical Exam  Vitals and nursing note reviewed.   Constitutional:       General: He is not in acute distress.     Appearance: He is not ill-appearing or toxic-appearing.   Cardiovascular:      Rate and Rhythm: Normal rate and regular rhythm.   Pulmonary:      Effort: Pulmonary effort is normal. No respiratory distress.   Abdominal:      General: There is no distension.      Palpations: Abdomen is soft.      Tenderness: There is no abdominal tenderness. There is no guarding or rebound.   Musculoskeletal:      Right lower leg: No edema.      Left lower leg: No edema.      Comments: Right thigh penrose in place with serous drainage. Counter incisions clean and dry without exudate.    Skin:     General: Skin is warm and dry.      Capillary Refill: Capillary refill takes less than 2 seconds.   Neurological:      Mental Status: He is alert. Mental status is at baseline.          Significant Labs:  I have reviewed all pertinent lab results within the past 24 hours.  CBC:   Recent Labs   Lab 03/01/25  0553   WBC 5.27   RBC 3.29*   HGB 9.8*   HCT 30.1*   *   MCV 92   MCH 29.8   MCHC 32.6     CMP:   Recent Labs   Lab 03/01/25  0553   GLU 84   CALCIUM 8.3*   ALBUMIN 1.8*   PROT 6.1   *   K 2.7*   CO2 34*   CL 92*   BUN 24*   CREATININE 1.8*   ALKPHOS 83   ALT 14   AST 49*   BILITOT 2.4*       Significant Diagnostics:  I have reviewed all pertinent imaging results/findings within the past 24 hours.  Assessment/Plan:     * Rhinovirus infection  Manage per primary. Droplet precautions.    Hypomagnesemia  Manage per primary    Cellulitis of right lower extremity  POD #2 s/p I&D of right thigh wound    -Serous fluid in cavity indicating old infected hematoma   -Cleanse wound daily and cover with ABD pad + kerlix + ACE   -Penrose with serous drainage   -Continue IV abx per medicine - may discontinue upon discharge   -OR cultures negative and in progress   -OK to ambulate ad keven   -Rest of care per primary     Hypokalemia  Manage per primary    Morbid obesity  Manage per primary    Moderate protein-calorie malnutrition  Nutrition consulted. Most recent weight and BMI monitored-     Measurements:  Wt Readings from Last 1 Encounters:   02/21/25 131.5 kg (290 lb)   Body mass index is 38.26 kg/m².    Patient has been screened and assessed by RD.    Malnutrition Type:  Context:    Level:      Malnutrition Characteristic Summary:       Interventions/Recommendations (treatment strategy):         Hypotension  Manage per primary    Hepatic encephalopathy  Manage per primary     CKD (chronic kidney disease) stage 4, GFR 15-29 ml/min  Manage per primary    Suspected Hepatorenal syndrome  Manage per primary    Longstanding persistent atrial fibrillation  Manage per primary    Coagulopathy  Manage per primary    Cirrhosis  Manage per primary        KIT TURNER NP  General Surgery  [facility]

## 2025-03-01 NOTE — ASSESSMENT & PLAN NOTE
Patient's most recent potassium results are listed below.   Recent Labs     02/27/25  0603 02/28/25  0549 03/01/25  0553   K 2.8* 2.6* 2.7*       Plan  - Replete potassium per protocol  - Monitor potassium Daily  - Patient's hypokalemia is stable  - Nephrology following.  Appreciate recs.    3/1:  Continued hypokalemia noted.  We will adjust p.o. potassium regimen.  Additional K riders given.  Continue monitoring with daily labs.

## 2025-03-01 NOTE — PLAN OF CARE
Plan of care reviewed with patient. Patient given 4 IV K-riders for low potassium on DOC. PIV flushed and infusing. Patient tolerated all medications well on DOC. AAOx4. VSS. Bed locked and in the lowest position. Call bell and patient belongings within reach.   Problem: Adult Inpatient Plan of Care  Goal: Plan of Care Review  Outcome: Progressing  Goal: Patient-Specific Goal (Individualized)  Outcome: Progressing  Goal: Absence of Hospital-Acquired Illness or Injury  Outcome: Progressing  Goal: Optimal Comfort and Wellbeing  Outcome: Progressing  Goal: Readiness for Transition of Care  Outcome: Progressing     Problem: Acute Kidney Injury/Impairment  Goal: Fluid and Electrolyte Balance  Outcome: Progressing  Goal: Improved Oral Intake  Outcome: Progressing  Goal: Effective Renal Function  Outcome: Progressing     Problem: Wound  Goal: Optimal Coping  Outcome: Progressing  Goal: Optimal Functional Ability  Outcome: Progressing  Goal: Absence of Infection Signs and Symptoms  Outcome: Progressing  Goal: Improved Oral Intake  Outcome: Progressing  Goal: Optimal Pain Control and Function  Outcome: Progressing  Goal: Skin Health and Integrity  Outcome: Progressing  Goal: Optimal Wound Healing  Outcome: Progressing

## 2025-03-01 NOTE — ASSESSMENT & PLAN NOTE
Patient has long standing persistent (>12 months) atrial fibrillation. Patient is currently in atrial fibrillation. [id number] Score: 1. The patients heart rate in the last 24 hours is as follows:  Pulse  Min: 82  Max: 94     Antiarrhythmics  metoprolol tartrate (LOPRESSOR) tablet 25 mg, 2 times daily, Oral    Anticoagulants       Plan  - Replete lytes with a goal of K>4, Mg >2  - Patient is anticoagulated, see medications listed above.  - Patient's afib is currently controlled    Auto anticoagulated with CLD.

## 2025-03-01 NOTE — PROGRESS NOTES
[facility] Medicine  Progress Note    Patient Name: Juan Carlos Yoo Sr.  MRN: [id number]  Patient Class: IP- Inpatient   Admission Date: 2/21/2025  Length of Stay: 7 days  Attending Physician: Joao Villegas III, MD  Primary Care Provider: Joao Villegas III, MD        Subjective     Principal Problem:Rhinovirus infection        HPI:  ED HPI:  Juan Carlos Yoo Sr. is a 69 y.o. male with PMHX of alcoholic cirrhosis, HCC, thrombocytopenia, Afib (not on anticoagulation due to thrombocytopenia), ESRD on HD M/W/F who presents to the emergency department C/O SOB.     Patient was recently  admitted to [facility] due to severe anasarca JAE, evaluated for hepatorenal syndrome and had 40 day hospitalization.  Patient released from rehab about a week ago.  Has endorse increasing shortness of breath for the past 3 days.  States at night it is worse when trying to sleep.  Patient lays on his side and uses 3 pillows to prop his head up.  Patient went to urgent care today because he felt like tonight was going to be another bad night and they sent him to ER.  He denies fever.  States swelling is about baseline.  Had typical dialysis session today.  He reports they were trying to take off 3 L but is unsure of how much volume was removed.  He makes urine but states it is difficult to urinate due to anasarca.  He states compliance to low-sodium diet and fluid restriction since recent discharge.  Has not had alcohol in over 7 months.    IM HPI:  Patient's chart reviewed and above history noted.  Patient's been admitted for acute on chronic dyspnea and was diagnosed with rhinovirus.  Patient had been having increasing dyspnea weakness coughing with clear sputum production over the last 36 hours and ultimately presented with worsening shortness of breath.  He has been placed on oxygen nasal cannula states he is feeling much better.  Patient has a mild cough and wheezing on exam.  Patient has a complex  medical history that is has been reviewed he seems slightly hypervolemic we will notify his nephrologist and he will likely need his routine hemodialysis if he ends up staying through Monday.  Patient is afebrile labs noted potassium low we will replete and maybe a good candidate for Aldactone.    Overview/Hospital Course:  2/23 FM:  Patient complaining of a area of redness and pain on the right thigh above the knee.  This started yesterday.  Patient's exam consistent with a area of focal cellulitis and induration.  Ordering ultrasound and starting antibiotics.  Patient's respiratory complaints are much improved and getting back to his baseline.  Patient's nephrologist aware he is in house and may need hemodialysis in a.m..    2/24 DL:  Patient doing well this morning.  He is sitting up in bed in his awake, alert, oriented.  Patient reports respiratory status improved.  Patient now off of supplemental O2.  Patient reports continued tenderness to area of redness to right inner thigh.  Redness and induration have spread past previously marked borders.  Patient is scheduled for ultrasound today.  Continue IV antibiotics.  Patient is scheduled for hemodialysis today.  Nephrology following.    2/25 DL:  Patient doing well this morning.  He is lying in bed in his awake, alert, oriented.  Respiratory status with continued improvement.  Labs and vital signs stable.  Ultrasound right thigh obtained yesterday reveals 12.6 x 6.1 x 3.0 cm complex fluid collection to the medial right thigh.  Venous ultrasound negative for DVT.  General surgery consulted.  Continue current IV antibiotics.  Renal function stable.  Nephrology following.    2/26 DL:  Patient doing well this morning.  He is lying in bed in his awake, alert, oriented.  Respiratory status at baseline.  Vital signs stable.  Patient afebrile without leukocytosis.  Continue current antibiotic treatment for abscess to right thigh.  General surgery consulted.  Appreciate  recs.  Hypokalemia and hypomagnesemia noted this morning.  We will replenish.  Nephrology following.  Appreciate recs.    2/27 DL:  Patient lying in bed resting comfortably.  He has just returned to room from OR for I and D of right inner thigh.  Patient tolerated procedure well.  Patient is afebrile without leukocytosis this morning.  We will continue current IV antibiotic treatment for right thigh abscess pending results of culture obtained per general surgery this morning.  Patient's potassium 2.8 this morning.  Continue p.o. replacement.  We will also give IV replacement today.  Magnesium pending.  Labs and vital signs otherwise stable.  Renal function stable.  Nephrology following.    2/28 DL:  Patient is sitting up on side of bed eating breakfast.  He endorses pain to right inner thigh I&D site that is controlled with current regimen.  Wound cultures with no growth to date x1 day.  Continue current antibiotic treatment.  Continued hypokalemia noted.  Continue p.o. replacement t.i.d.  We we will also give IV replacement today.  Hypomagnesemia also noted.  We will replenish today.  Renal function stable.  Nephrology following.  Labs and vital signs otherwise stable.  Patient with continued improved respiratory status.  Continue discharge planning.    3/1 DL:  Patient doing well this morning.  He is lying in bed is awake, alert, oriented.  Patient reports pain to right thigh controlled with current regimen.  Scant drainage from I&D site.  Patient is afebrile without leukocytosis.  Anaerobic culture pending.  Aerobic culture with no growth to date.  Continue current IV antibiotics.  Respiratory status stable.  Patient with continued hypokalemia.  We will switch p.o. potassium to effervescent potassium.  Additional K riders given.  Continue monitoring with daily labs.  Continue discharge planning.      Past Medical History:   Diagnosis Date    Atrial fibrillation     Bulging disc     Cirrhosis     Colon polyp  2017    Rpt 5 yrs    EV (esophageal varices)     Hypertension 2023    Renal disorder     Skin cancer 2017    Thrombocytopenia        Past Surgical History:   Procedure Laterality Date    CATHETERIZATION OF BOTH LEFT AND RIGHT HEART N/A 2023    Procedure: CATHETERIZATION, HEART, BOTH LEFT AND RIGHT;  Surgeon: Flo Malone MD;  Location: [facility] CATH LAB;  Service: Cardiology;  Laterality: N/A;  low bleeding risk 1.0%    CHOLECYSTECTOMY      COLONOSCOPY      COLONOSCOPY N/A 2017    ta rpt     CORONARY ANGIOGRAPHY N/A 2023    Procedure: ANGIOGRAM, CORONARY ARTERY;  Surgeon: Flo Malone MD;  Location: [facility] CATH LAB;  Service: Cardiology;  Laterality: N/A;    HERNIA REPAIR      INCISION AND DRAINAGE Right 2025    Procedure: Incision and Drainage, thigh;  Surgeon: Kayla Foster MD;  Location: [facility] OR;  Service: General;  Laterality: Right;  Plan to go first if pt has cardiac clearance - SB    SKIN BIOPSY  2017    TONSILLECTOMY      UPPER GASTROINTESTINAL ENDOSCOPY      EV    UPPER GASTROINTESTINAL ENDOSCOPY      VASECTOMY         Review of patient's allergies indicates:  No Known Allergies    No current facility-administered medications on file prior to encounter.     Current Outpatient Medications on File Prior to Encounter   Medication Sig    famotidine (PEPCID) 20 MG tablet TAKE 1 TABLET BY MOUTH EVERY OTHER DAY    furosemide (LASIX) 80 MG tablet Take 80 mg by mouth every Tuesday, Thursday, Saturday, .    lactulose (CHRONULAC) 20 gram/30 mL Soln Take 45 mLs (30 g total) by mouth 3 (three) times daily. TITRATE TO 3-4 BOWEL MOVEMENTS DAILY.    metoprolol tartrate (LOPRESSOR) 25 MG tablet TAKE 0.5 TABLETS BY MOUTH 2 TIMES DAILY.    midodrine (PROAMATINE) 5 MG Tab Take 3 tablets (15 mg total) by mouth every 8 (eight) hours.    potassium chloride (K-TAB) 20 mEq SMARTSI Tablet(s) By Mouth    tamsulosin (FLOMAX) 0.4 mg Cap Take by mouth.     tuberculin,purif.prot.deriv. (TUBERSOL IDRM) Inject 0.1 mLs into the skin.     Family History       Problem Relation (Age of Onset)    Cancer Maternal Uncle    Heart disease Maternal Uncle    Hyperlipidemia Brother    Ovarian cancer Sister          Tobacco Use    Smoking status: Never     Passive exposure: Never    Smokeless tobacco: Never   Substance and Sexual Activity    Alcohol use: Not Currently     Alcohol/week: 0.0 standard drinks of alcohol    Drug use: No    Sexual activity: Not Currently     Review of Systems   Constitutional:  Positive for fatigue. Negative for activity change, appetite change, chills and fever.   HENT:  Negative for ear pain, mouth sores, nosebleeds and sore throat.    Eyes:  Negative for visual disturbance.   Respiratory:  Positive for cough and wheezing. Negative for chest tightness, shortness of breath and stridor.    Cardiovascular:  Positive for leg swelling. Negative for chest pain and palpitations.   Gastrointestinal:  Negative for abdominal distention, abdominal pain, anal bleeding, blood in stool, constipation, diarrhea, nausea, rectal pain and vomiting.   Endocrine: Negative for polyphagia.   Genitourinary:  Positive for difficulty urinating. Negative for dysuria, flank pain and frequency.   Musculoskeletal:  Positive for gait problem. Negative for myalgias.   Skin:  Positive for color change and rash.   Neurological:  Positive for weakness. Negative for dizziness, tremors, seizures, syncope, facial asymmetry, speech difficulty and headaches.   Hematological:  Negative for adenopathy.   Psychiatric/Behavioral:  Positive for confusion. Negative for agitation and hallucinations. The patient is not nervous/anxious.      Objective:     Vital Signs (Most Recent):  Temp: 97.6 °F (36.4 °C) (03/01/25 0330)  Pulse: 84 (03/01/25 0915)  Resp: 18 (03/01/25 0733)  BP: 101/62 (03/01/25 0915)  SpO2: 95 % (03/01/25 0733) Vital Signs (24h Range):  Temp:  [97.3 °F (36.3 °C)-98 °F (36.7 °C)] 97.6  °F (36.4 °C)  Pulse:  [82-94] 84  Resp:  [18-20] 18  SpO2:  [95 %-99 %] 95 %  BP: (100-125)/(52-63) 101/62     Weight: 131.5 kg (290 lb)  Body mass index is 38.26 kg/m².     Physical Exam  Vitals and nursing note reviewed.   Constitutional:       General: He is not in acute distress.     Appearance: He is obese. He is not ill-appearing, toxic-appearing or diaphoretic.   HENT:      Head: Normocephalic and atraumatic.      Right Ear: External ear normal.      Left Ear: External ear normal.      Nose: Nose normal. No rhinorrhea.      Mouth/Throat:      Mouth: Mucous membranes are moist.      Pharynx: No oropharyngeal exudate or posterior oropharyngeal erythema.   Eyes:      General: Scleral icterus present.      Extraocular Movements: Extraocular movements intact.   Neck:      Vascular: No carotid bruit.   Cardiovascular:      Rate and Rhythm: Normal rate and regular rhythm.      Heart sounds: Normal heart sounds. No murmur heard.     No friction rub. No gallop.   Pulmonary:      Effort: Pulmonary effort is normal. No respiratory distress.      Breath sounds: No stridor. Wheezing and rhonchi present. No rales.   Chest:      Chest wall: No tenderness.   Abdominal:      General: There is no distension.      Palpations: Abdomen is soft. There is no mass.      Tenderness: There is no abdominal tenderness. There is no right CVA tenderness, left CVA tenderness, guarding or rebound.      Hernia: No hernia is present.   Musculoskeletal:         General: No swelling, tenderness or deformity.      Cervical back: No rigidity.      Right lower leg: No edema.      Left lower leg: No edema.      Comments: Right thigh penrose in place with serosanguinous drainage.  Counter incisions clean and dry without exudate.    Lymphadenopathy:      Cervical: No cervical adenopathy.   Skin:     General: Skin is warm and dry.      Capillary Refill: Capillary refill takes less than 2 seconds.      Coloration: Skin is jaundiced.      Findings:  Erythema and rash present.             Comments: 12 x 10 cm area of erythema and fluctuance to medial right thigh s/p I&D this morning.  ACE wrap in place without drainage.   Neurological:      Mental Status: He is alert. Mental status is at baseline.      Cranial Nerves: No cranial nerve deficit.      Sensory: No sensory deficit.      Motor: Weakness present.      Coordination: Coordination normal.   Psychiatric:         Mood and Affect: Mood normal.         Behavior: Behavior normal.         Thought Content: Thought content normal.         Judgment: Judgment normal.                Significant Labs: All pertinent labs within the past 24 hours have been reviewed.  Recent Lab Results         03/01/25  0553        Albumin 1.8       ALP 83       ALT 14       Anion Gap 9       AST 49       Baso # 0.07       Basophil % 1.3       BILIRUBIN TOTAL 2.4  Comment: For infants and newborns, interpretation of results should be based  on gestational age, weight and in agreement with clinical  observations.    Premature Infant recommended reference ranges:  Up to 24 hours.............<8.0 mg/dL  Up to 48 hours............<12.0 mg/dL  3-5 days..................<15.0 mg/dL  6-29 days.................<15.0 mg/dL         BUN 24       Calcium 8.3       Chloride 92       CO2 34       Creatinine 1.8       Differential Method Automated       eGFR 39.7       Eos # 0.1       Eos % 2.3       Glucose 84       Gran # (ANC) 3.1       Gran % 58.3       Hematocrit 30.1       Hemoglobin 9.8       Immature Grans (Abs) 0.05  Comment: Mild elevation in immature granulocytes is non specific and   can be seen in a variety of conditions including stress response,   acute inflammation, trauma and pregnancy. Correlation with other   laboratory and clinical findings is essential.         Immature Granulocytes 0.9       Lymph # 1.1       Lymph % 20.9       Magnesium  1.5       MCH 29.8       MCHC 32.6       MCV 92       Mono # 0.9       Mono % 16.3        MPV 11.8       nRBC 0       Platelet Count 101       Potassium 2.7  Comment: Potassium critical result(s) called and verbal readback obtained from   Abran Zayas LPN by [facility] 03/01/2025 08:23         PROTEIN TOTAL 6.1       RBC 3.29       RDW 18.8       Sodium 135       WBC 5.27               Significant Imaging: I have reviewed all pertinent imaging results/findings within the past 24 hours.    Assessment and Plan     * Rhinovirus infection  Conservative care, + now with O2 needs.    2/24:  Continue conservative care.  Symptoms improving.  Patient with stable O2 sats on room air.    2/27:  Stable.  Continue conservative care.      Hypomagnesemia  Patient has Abnormal Magnesium: hypomagnesemia. Will continue to monitor electrolytes closely. Will replace the affected electrolytes and repeat labs to be done after interventions completed. The patient's magnesium results have been reviewed and are listed below.  Recent Labs   Lab 03/01/25  0553   MG 1.5*          Cellulitis of right lower extremity  2/23 FM:  Starting abx, get US.    2/24:  Increased induration and erythema noted.  Ultrasound pending.  Patient afebrile without leukocytosis.  Continue IV antibiotics.      2/25:  Ultrasound obtained yesterday reveals 12.6 x 6.1 x 3.0 cm complex fluid collection to patient's medial right thigh.  General surgery consulted.  Appreciate recs.  Patient afebrile without leukocytosis.  Continue current IV antibiotic treatment.    2/26:  Patient remains afebrile without leukocytosis.  Continue antibiotic treatment.  General surgery consulted.  Appreciate recs.    2/27:  S/P I&D per general surgery this morning.  Cultures pending.  Continue current IV abx treatment.    2/28:  Afebrile without leukocytosis.  Cultures pending.  Continue current IV antibiotic treatment.    3/1:  Patient afebrile without leukocytosis.  Cultures pending.  Continue current IV antibiotic treatment.  Pain controlled.    Hypokalemia  Patient's most recent  potassium results are listed below.   Recent Labs     02/27/25  0603 02/28/25  0549 03/01/25  0553   K 2.8* 2.6* 2.7*       Plan  - Replete potassium per protocol  - Monitor potassium Daily  - Patient's hypokalemia is stable  - Nephrology following.  Appreciate recs.    3/1:  Continued hypokalemia noted.  We will adjust p.o. potassium regimen.  Additional K riders given.  Continue monitoring with daily labs.    Morbid obesity  Body mass index is 38.26 kg/m². Morbid obesity complicates all aspects of disease management from diagnostic modalities to treatment. Weight loss encouraged and health benefits explained to patient.         Moderate protein-calorie malnutrition  Nutrition consulted. Most recent weight and BMI monitored-     Measurements:  Wt Readings from Last 1 Encounters:   02/21/25 131.5 kg (290 lb)   Body mass index is 38.26 kg/m².    Patient has been screened and assessed by RD.    Malnutrition Type:  Context:    Level:      Malnutrition Characteristic Summary:       Interventions/Recommendations (treatment strategy):         Hypotension  Blood pressure stable.  Continue monitoring.      Hepatic encephalopathy  Continue lactulose.      CKD (chronic kidney disease) stage 4, GFR 15-29 ml/min  Creatine stable for now. BMP reviewed- noted Estimated Creatinine Clearance: 53.5 mL/min (A) (based on SCr of 1.8 mg/dL (H)). according to latest data. Based on current GFR, CKD stage is stage 5 - GFR < 15.  Monitor UOP and serial BMP and adjust therapy as needed. Renally dose meds. Avoid nephrotoxic medications and procedures.    Per renal, currently on MWF HD, ? Still needs this?    Suspected Hepatorenal syndrome  Slightly hypervolemic today, much improved from past care.      Longstanding persistent atrial fibrillation  Patient has long standing persistent (>12 months) atrial fibrillation. Patient is currently in atrial fibrillation. [id number] Score: 1. The patients heart rate in the last 24 hours is as follows:   Pulse  Min: 82  Max: 94     Antiarrhythmics  metoprolol tartrate (LOPRESSOR) tablet 25 mg, 2 times daily, Oral    Anticoagulants       Plan  - Replete lytes with a goal of K>4, Mg >2  - Patient is anticoagulated, see medications listed above.  - Patient's afib is currently controlled    Auto anticoagulated with CLD.        Coagulopathy  CLD      Cirrhosis  Patient with known Cirrhosis with Child's class C. Co-morbidities are present and inclusive of portal hypertension, hepatic encephalopathy, malnutrition, anemia/pancytopenia, and anticoagulation.  MELD-Na score calculated; MELD 3.0: 28 at 2/23/2025  5:15 AM  MELD-Na: 26 at 2/23/2025  5:15 AM  Calculated from:  Serum Creatinine: 1.8 mg/dL at 2/23/2025  5:15 AM  Serum Sodium: 133 mmol/L at 2/23/2025  5:15 AM  Total Bilirubin: 3.9 mg/dL at 2/23/2025  5:15 AM  Serum Albumin: 2.1 g/dL at 2/23/2025  5:15 AM  INR(ratio): 1.9 at 2/21/2025  6:42 PM  Age at listing (hypothetical): 69 years  Sex: Male at 2/23/2025  5:15 AM      Continue chronic meds. Etiology likely ETOH. Will avoid any hepatotoxic meds, and monitor CBC/CMP/INR for synthetic function.       VTE Risk Mitigation (From admission, onward)           Ordered     Place sequential compression device  Until discontinued         02/22/25 1002     IP VTE HIGH RISK PATIENT  Once         02/21/25 2204     Place ANDREW hose  Until discontinued         02/21/25 2204                    Discharge Planning   AUTUMN:      Code Status: Full Code   Medical Readiness for Discharge Date:   Discharge Plan A: Home   Discharge Delays: None known at this time            Please place Justification for DME        Pilar Gates NP  Department of Hospital Medicine   [facility]

## 2025-03-01 NOTE — SUBJECTIVE & OBJECTIVE
Interval History: NAEON.  Pain improved after drainage.      Medications:  Continuous Infusions:  Scheduled Meds:   cefTRIAXone (Rocephin) IV (PEDS and ADULTS)  1 g Intravenous Q12H    diclofenac sodium  4 g Topical (Top) TID    famotidine  20 mg Oral Daily    furosemide  80 mg Oral BID    lactulose  30 g Oral TID    levalbuterol  1.25 mg Nebulization QID WAKE    magnesium chloride  128 mg Oral Daily    metoprolol tartrate  25 mg Oral BID    midodrine  5 mg Oral Q8H    potassium bicarbonate  25 mEq Oral TID    potassium chloride  10 mEq Intravenous Q1H    tamsulosin  0.4 mg Oral Daily    thiamine  100 mg Oral Daily     PRN Meds:  Current Facility-Administered Medications:     melatonin, 6 mg, Oral, Nightly PRN    ondansetron, 4 mg, Intravenous, Q8H PRN    sodium chloride 0.9%, 10 mL, Intravenous, PRN    traMADoL, 50 mg, Oral, Q6H PRN     Review of patient's allergies indicates:  No Known Allergies  Objective:     Vital Signs (Most Recent):  Temp: 97.6 °F (36.4 °C) (03/01/25 0330)  Pulse: 84 (03/01/25 0915)  Resp: 18 (03/01/25 0733)  BP: 101/62 (03/01/25 0915)  SpO2: 95 % (03/01/25 0733) Vital Signs (24h Range):  Temp:  [97.3 °F (36.3 °C)-98 °F (36.7 °C)] 97.6 °F (36.4 °C)  Pulse:  [82-94] 84  Resp:  [18-20] 18  SpO2:  [95 %-99 %] 95 %  BP: (100-125)/(52-63) 101/62     Weight: 131.5 kg (290 lb)  Body mass index is 38.26 kg/m².    Intake/Output - Last 3 Shifts         02/27 0700  02/28 0659 02/28 0700 03/01 0659 03/01 0700 03/02 0659    P.O. 480 480     I.V. (mL/kg) 93.3 (0.7)      IV Piggyback 407.1      Total Intake(mL/kg) 980.4 (7.5) 480 (3.7)     Urine (mL/kg/hr)  0 (0)     Stool  0     Total Output  0     Net +980.4 +480            Urine Occurrence 2 x 4 x     Stool Occurrence 0 x 0 x     Emesis Occurrence 0 x               Physical Exam  Vitals and nursing note reviewed.   Constitutional:       General: He is not in acute distress.     Appearance: He is not ill-appearing or toxic-appearing.   Cardiovascular:       Rate and Rhythm: Normal rate and regular rhythm.   Pulmonary:      Effort: Pulmonary effort is normal. No respiratory distress.   Abdominal:      General: There is no distension.      Palpations: Abdomen is soft.      Tenderness: There is no abdominal tenderness. There is no guarding or rebound.   Musculoskeletal:      Right lower leg: No edema.      Left lower leg: No edema.      Comments: Right thigh penrose in place with serous drainage. Counter incisions clean and dry without exudate.    Skin:     General: Skin is warm and dry.      Capillary Refill: Capillary refill takes less than 2 seconds.   Neurological:      Mental Status: He is alert. Mental status is at baseline.          Significant Labs:  I have reviewed all pertinent lab results within the past 24 hours.  CBC:   Recent Labs   Lab 03/01/25  0553   WBC 5.27   RBC 3.29*   HGB 9.8*   HCT 30.1*   *   MCV 92   MCH 29.8   MCHC 32.6     CMP:   Recent Labs   Lab 03/01/25  0553   GLU 84   CALCIUM 8.3*   ALBUMIN 1.8*   PROT 6.1   *   K 2.7*   CO2 34*   CL 92*   BUN 24*   CREATININE 1.8*   ALKPHOS 83   ALT 14   AST 49*   BILITOT 2.4*       Significant Diagnostics:  I have reviewed all pertinent imaging results/findings within the past 24 hours.

## 2025-03-02 LAB
ALBUMIN SERPL BCP-MCNC: 2 G/DL (ref 3.5–5.2)
ALP SERPL-CCNC: 79 U/L (ref 55–135)
ALT SERPL W/O P-5'-P-CCNC: 17 U/L (ref 10–44)
ANION GAP SERPL CALC-SCNC: 11 MMOL/L (ref 8–16)
AST SERPL-CCNC: 54 U/L (ref 10–40)
BASOPHILS # BLD AUTO: 0.07 K/UL (ref 0–0.2)
BASOPHILS NFR BLD: 1.4 % (ref 0–1.9)
BILIRUB SERPL-MCNC: 2.6 MG/DL (ref 0.1–1)
BUN SERPL-MCNC: 23 MG/DL (ref 8–23)
CALCIUM SERPL-MCNC: 8.2 MG/DL (ref 8.7–10.5)
CHLORIDE SERPL-SCNC: 89 MMOL/L (ref 95–110)
CO2 SERPL-SCNC: 33 MMOL/L (ref 23–29)
CREAT SERPL-MCNC: 1.7 MG/DL (ref 0.5–1.4)
DIFFERENTIAL METHOD BLD: ABNORMAL
EOSINOPHIL # BLD AUTO: 0.1 K/UL (ref 0–0.5)
EOSINOPHIL NFR BLD: 1.6 % (ref 0–8)
ERYTHROCYTE [DISTWIDTH] IN BLOOD BY AUTOMATED COUNT: 18.9 % (ref 11.5–14.5)
EST. GFR  (NO RACE VARIABLE): 42.6 ML/MIN/1.73 M^2
GLUCOSE SERPL-MCNC: 80 MG/DL (ref 70–110)
HCT VFR BLD AUTO: 32.5 % (ref 40–54)
HGB BLD-MCNC: 10.6 G/DL (ref 14–18)
IMM GRANULOCYTES # BLD AUTO: 0.06 K/UL (ref 0–0.04)
IMM GRANULOCYTES NFR BLD AUTO: 1.2 % (ref 0–0.5)
LYMPHOCYTES # BLD AUTO: 1 K/UL (ref 1–4.8)
LYMPHOCYTES NFR BLD: 19.6 % (ref 18–48)
MAGNESIUM SERPL-MCNC: 1.2 MG/DL (ref 1.6–2.6)
MCH RBC QN AUTO: 30 PG (ref 27–31)
MCHC RBC AUTO-ENTMCNC: 32.6 G/DL (ref 32–36)
MCV RBC AUTO: 92 FL (ref 82–98)
MONOCYTES # BLD AUTO: 0.7 K/UL (ref 0.3–1)
MONOCYTES NFR BLD: 13.2 % (ref 4–15)
NEUTROPHILS # BLD AUTO: 3.3 K/UL (ref 1.8–7.7)
NEUTROPHILS NFR BLD: 63 % (ref 38–73)
NRBC BLD-RTO: 0 /100 WBC
PLATELET # BLD AUTO: 100 K/UL (ref 150–450)
PMV BLD AUTO: 11.6 FL (ref 9.2–12.9)
POTASSIUM SERPL-SCNC: 2.6 MMOL/L (ref 3.5–5.1)
PROT SERPL-MCNC: 6.5 G/DL (ref 6–8.4)
RBC # BLD AUTO: 3.53 M/UL (ref 4.6–6.2)
SODIUM SERPL-SCNC: 133 MMOL/L (ref 136–145)
WBC # BLD AUTO: 5.16 K/UL (ref 3.9–12.7)

## 2025-03-02 PROCEDURE — 25000242 PHARM REV CODE 250 ALT 637 W/ HCPCS: Performed by: STUDENT IN AN ORGANIZED HEALTH CARE EDUCATION/TRAINING PROGRAM

## 2025-03-02 PROCEDURE — 85025 COMPLETE CBC W/AUTO DIFF WBC: CPT | Performed by: STUDENT IN AN ORGANIZED HEALTH CARE EDUCATION/TRAINING PROGRAM

## 2025-03-02 PROCEDURE — 25000003 PHARM REV CODE 250: Performed by: STUDENT IN AN ORGANIZED HEALTH CARE EDUCATION/TRAINING PROGRAM

## 2025-03-02 PROCEDURE — 36415 COLL VENOUS BLD VENIPUNCTURE: CPT | Performed by: STUDENT IN AN ORGANIZED HEALTH CARE EDUCATION/TRAINING PROGRAM

## 2025-03-02 PROCEDURE — 99900035 HC TECH TIME PER 15 MIN (STAT)

## 2025-03-02 PROCEDURE — 99900031 HC PATIENT EDUCATION (STAT)

## 2025-03-02 PROCEDURE — 80053 COMPREHEN METABOLIC PANEL: CPT | Performed by: STUDENT IN AN ORGANIZED HEALTH CARE EDUCATION/TRAINING PROGRAM

## 2025-03-02 PROCEDURE — 27000207 HC ISOLATION

## 2025-03-02 PROCEDURE — 25000003 PHARM REV CODE 250

## 2025-03-02 PROCEDURE — 63600175 PHARM REV CODE 636 W HCPCS

## 2025-03-02 PROCEDURE — 83735 ASSAY OF MAGNESIUM: CPT | Performed by: STUDENT IN AN ORGANIZED HEALTH CARE EDUCATION/TRAINING PROGRAM

## 2025-03-02 PROCEDURE — 94761 N-INVAS EAR/PLS OXIMETRY MLT: CPT

## 2025-03-02 PROCEDURE — 11000001 HC ACUTE MED/SURG PRIVATE ROOM

## 2025-03-02 PROCEDURE — 94640 AIRWAY INHALATION TREATMENT: CPT

## 2025-03-02 RX ORDER — FUROSEMIDE 40 MG/1
80 TABLET ORAL DAILY
Status: DISCONTINUED | OUTPATIENT
Start: 2025-03-02 | End: 2025-03-05 | Stop reason: HOSPADM

## 2025-03-02 RX ORDER — POTASSIUM CHLORIDE 7.45 MG/ML
10 INJECTION INTRAVENOUS
Status: DISPENSED | OUTPATIENT
Start: 2025-03-02 | End: 2025-03-02

## 2025-03-02 RX ADMIN — POTASSIUM BICARBONATE 50 MEQ: 978 TABLET, EFFERVESCENT ORAL at 09:03

## 2025-03-02 RX ADMIN — MIDODRINE HYDROCHLORIDE 5 MG: 5 TABLET ORAL at 10:03

## 2025-03-02 RX ADMIN — METOPROLOL TARTRATE 25 MG: 25 TABLET, FILM COATED ORAL at 10:03

## 2025-03-02 RX ADMIN — DICLOFENAC SODIUM 4 G: 10 GEL TOPICAL at 03:03

## 2025-03-02 RX ADMIN — POTASSIUM CHLORIDE 10 MEQ: 7.46 INJECTION, SOLUTION INTRAVENOUS at 11:03

## 2025-03-02 RX ADMIN — LACTULOSE 30 G: 20 SOLUTION ORAL at 03:03

## 2025-03-02 RX ADMIN — LEVALBUTEROL 1.25 MG: 1.25 SOLUTION, CONCENTRATE RESPIRATORY (INHALATION) at 03:03

## 2025-03-02 RX ADMIN — LEVALBUTEROL 1.25 MG: 1.25 SOLUTION, CONCENTRATE RESPIRATORY (INHALATION) at 07:03

## 2025-03-02 RX ADMIN — POTASSIUM CHLORIDE 10 MEQ: 7.46 INJECTION, SOLUTION INTRAVENOUS at 02:03

## 2025-03-02 RX ADMIN — Medication 100 MG: at 09:03

## 2025-03-02 RX ADMIN — METOPROLOL TARTRATE 25 MG: 25 TABLET, FILM COATED ORAL at 09:03

## 2025-03-02 RX ADMIN — MIDODRINE HYDROCHLORIDE 5 MG: 5 TABLET ORAL at 06:03

## 2025-03-02 RX ADMIN — LACTULOSE 30 G: 20 SOLUTION ORAL at 10:03

## 2025-03-02 RX ADMIN — POTASSIUM BICARBONATE 50 MEQ: 978 TABLET, EFFERVESCENT ORAL at 10:03

## 2025-03-02 RX ADMIN — LACTULOSE 30 G: 20 SOLUTION ORAL at 09:03

## 2025-03-02 RX ADMIN — TRAMADOL HYDROCHLORIDE 50 MG: 50 TABLET, COATED ORAL at 10:03

## 2025-03-02 RX ADMIN — DICLOFENAC SODIUM 4 G: 10 GEL TOPICAL at 09:03

## 2025-03-02 RX ADMIN — MAGNESIUM 64 MG (MAGNESIUM CHLORIDE) TABLET,DELAYED RELEASE 128 MG: at 09:03

## 2025-03-02 RX ADMIN — TAMSULOSIN HYDROCHLORIDE 0.4 MG: 0.4 CAPSULE ORAL at 09:03

## 2025-03-02 RX ADMIN — POTASSIUM CHLORIDE 10 MEQ: 7.46 INJECTION, SOLUTION INTRAVENOUS at 10:03

## 2025-03-02 RX ADMIN — POTASSIUM BICARBONATE 50 MEQ: 978 TABLET, EFFERVESCENT ORAL at 03:03

## 2025-03-02 RX ADMIN — FUROSEMIDE 80 MG: 40 TABLET ORAL at 09:03

## 2025-03-02 RX ADMIN — FAMOTIDINE 20 MG: 20 TABLET, FILM COATED ORAL at 09:03

## 2025-03-02 RX ADMIN — LEVALBUTEROL 1.25 MG: 1.25 SOLUTION, CONCENTRATE RESPIRATORY (INHALATION) at 11:03

## 2025-03-02 NOTE — ASSESSMENT & PLAN NOTE
Conservative care, + now with O2 needs.    2/24:  Continue conservative care.  Symptoms improving.  Patient with stable O2 sats on room air.    2/27:  Stable.  Continue conservative care.    3/2:  Resolved

## 2025-03-02 NOTE — SUBJECTIVE & OBJECTIVE
Past Medical History:   Diagnosis Date    Atrial fibrillation     Bulging disc     Cirrhosis     Colon polyp 12/29/2017    Rpt 5 yrs    EV (esophageal varices)     Hypertension 03/30/2023    Renal disorder     Skin cancer 03/2017    Thrombocytopenia        Past Surgical History:   Procedure Laterality Date    CATHETERIZATION OF BOTH LEFT AND RIGHT HEART N/A 2/8/2023    Procedure: CATHETERIZATION, HEART, BOTH LEFT AND RIGHT;  Surgeon: Flo Malone MD;  Location: [facility] CATH LAB;  Service: Cardiology;  Laterality: N/A;  low bleeding risk 1.0%    CHOLECYSTECTOMY      COLONOSCOPY      COLONOSCOPY N/A 12/29/2017    ta rpt 2022    CORONARY ANGIOGRAPHY N/A 2/8/2023    Procedure: ANGIOGRAM, CORONARY ARTERY;  Surgeon: Flo Malone MD;  Location: [facility] CATH LAB;  Service: Cardiology;  Laterality: N/A;    HERNIA REPAIR      INCISION AND DRAINAGE Right 2/27/2025    Procedure: Incision and Drainage, thigh;  Surgeon: Kayla Foster MD;  Location: [facility];  Service: General;  Laterality: Right;  Plan to go first if pt has cardiac clearance - SB    SKIN BIOPSY  03/2017    TONSILLECTOMY      UPPER GASTROINTESTINAL ENDOSCOPY  2011    EV    UPPER GASTROINTESTINAL ENDOSCOPY  2016    VASECTOMY         Review of patient's allergies indicates:  No Known Allergies    No current facility-administered medications on file prior to encounter.     Current Outpatient Medications on File Prior to Encounter   Medication Sig    famotidine (PEPCID) 20 MG tablet TAKE 1 TABLET BY MOUTH EVERY OTHER DAY    furosemide (LASIX) 80 MG tablet Take 80 mg by mouth every Tuesday, Thursday, Saturday, Sunday.    lactulose (CHRONULAC) 20 gram/30 mL Soln Take 45 mLs (30 g total) by mouth 3 (three) times daily. TITRATE TO 3-4 BOWEL MOVEMENTS DAILY.    metoprolol tartrate (LOPRESSOR) 25 MG tablet TAKE 0.5 TABLETS BY MOUTH 2 TIMES DAILY.    midodrine (PROAMATINE) 5 MG Tab Take 3 tablets (15 mg total) by mouth every 8 (eight) hours.    potassium chloride  (K-TAB) 20 mEq SMARTSI Tablet(s) By Mouth    tamsulosin (FLOMAX) 0.4 mg Cap Take by mouth.    tuberculin,purif.prot.deriv. (TUBERSOL IDRM) Inject 0.1 mLs into the skin.     Family History       Problem Relation (Age of Onset)    Cancer Maternal Uncle    Heart disease Maternal Uncle    Hyperlipidemia Brother    Ovarian cancer Sister          Tobacco Use    Smoking status: Never     Passive exposure: Never    Smokeless tobacco: Never   Substance and Sexual Activity    Alcohol use: Not Currently     Alcohol/week: 0.0 standard drinks of alcohol    Drug use: No    Sexual activity: Not Currently     Review of Systems   Constitutional:  Positive for fatigue. Negative for activity change, appetite change, chills and fever.   HENT:  Negative for ear pain, mouth sores, nosebleeds and sore throat.    Eyes:  Negative for visual disturbance.   Respiratory:  Positive for cough and wheezing. Negative for chest tightness, shortness of breath and stridor.    Cardiovascular:  Positive for leg swelling. Negative for chest pain and palpitations.   Gastrointestinal:  Negative for abdominal distention, abdominal pain, anal bleeding, blood in stool, constipation, diarrhea, nausea, rectal pain and vomiting.   Endocrine: Negative for polyphagia.   Genitourinary:  Positive for difficulty urinating. Negative for dysuria, flank pain and frequency.   Musculoskeletal:  Positive for gait problem. Negative for myalgias.   Skin:  Positive for color change and rash.   Neurological:  Positive for weakness. Negative for dizziness, tremors, seizures, syncope, facial asymmetry, speech difficulty and headaches.   Hematological:  Negative for adenopathy.   Psychiatric/Behavioral:  Positive for confusion. Negative for agitation and hallucinations. The patient is not nervous/anxious.      Objective:     Vital Signs (Most Recent):  Temp: 97.7 °F (36.5 °C) (25 0634)  Pulse: 94 (25 0736)  Resp: 20 (25 0736)  BP: (!) 112/56 (25  0634)  SpO2: 97 % (03/02/25 0736) Vital Signs (24h Range):  Temp:  [97.7 °F (36.5 °C)-98 °F (36.7 °C)] 97.7 °F (36.5 °C)  Pulse:  [83-94] 94  Resp:  [18-20] 20  SpO2:  [94 %-99 %] 97 %  BP: (100-112)/(56-62) 112/56     Weight: 131.5 kg (290 lb)  Body mass index is 38.26 kg/m².     Physical Exam  Vitals and nursing note reviewed.   Constitutional:       General: He is not in acute distress.     Appearance: He is obese. He is not ill-appearing, toxic-appearing or diaphoretic.   HENT:      Head: Normocephalic and atraumatic.      Right Ear: External ear normal.      Left Ear: External ear normal.      Nose: Nose normal. No rhinorrhea.      Mouth/Throat:      Mouth: Mucous membranes are moist.      Pharynx: No oropharyngeal exudate or posterior oropharyngeal erythema.   Eyes:      General: Scleral icterus present.      Extraocular Movements: Extraocular movements intact.   Neck:      Vascular: No carotid bruit.   Cardiovascular:      Rate and Rhythm: Normal rate and regular rhythm.      Heart sounds: Normal heart sounds. No murmur heard.     No friction rub. No gallop.   Pulmonary:      Effort: Pulmonary effort is normal. No respiratory distress.      Breath sounds: No stridor. Wheezing and rhonchi present. No rales.   Chest:      Chest wall: No tenderness.   Abdominal:      General: There is no distension.      Palpations: Abdomen is soft. There is no mass.      Tenderness: There is no abdominal tenderness. There is no right CVA tenderness, left CVA tenderness, guarding or rebound.      Hernia: No hernia is present.   Musculoskeletal:         General: No swelling, tenderness or deformity.      Cervical back: No rigidity.      Right lower leg: No edema.      Left lower leg: No edema.      Comments: Right thigh penrose in place with serous drainage. Counter incisions clean and dry without exudate.    Lymphadenopathy:      Cervical: No cervical adenopathy.   Skin:     General: Skin is warm and dry.      Capillary Refill:  Capillary refill takes less than 2 seconds.      Coloration: Skin is jaundiced.      Findings: Erythema and rash present.             Comments: 12 x 10 cm area of erythema and fluctuance to medial right thigh s/p I&D this morning.  ACE wrap in place without drainage.   Neurological:      Mental Status: He is alert. Mental status is at baseline.      Cranial Nerves: No cranial nerve deficit.      Sensory: No sensory deficit.      Motor: Weakness present.      Coordination: Coordination normal.   Psychiatric:         Mood and Affect: Mood normal.         Behavior: Behavior normal.         Thought Content: Thought content normal.         Judgment: Judgment normal.                Significant Labs: All pertinent labs within the past 24 hours have been reviewed.  Recent Lab Results         03/02/25  0545        Baso # 0.07       Basophil % 1.4       Differential Method Automated       Eos # 0.1       Eos % 1.6       Gran # (ANC) 3.3       Gran % 63.0       Hematocrit 32.5       Hemoglobin 10.6       Immature Grans (Abs) 0.06  Comment: Mild elevation in immature granulocytes is non specific and   can be seen in a variety of conditions including stress response,   acute inflammation, trauma and pregnancy. Correlation with other   laboratory and clinical findings is essential.         Immature Granulocytes 1.2       Lymph # 1.0       Lymph % 19.6       MCH 30.0       MCHC 32.6       MCV 92       Mono # 0.7       Mono % 13.2       MPV 11.6       nRBC 0       Platelet Count 100       RBC 3.53       RDW 18.9       WBC 5.16               Significant Imaging: I have reviewed all pertinent imaging results/findings within the past 24 hours.

## 2025-03-02 NOTE — ASSESSMENT & PLAN NOTE
2/23 FM:  Starting abx, get US.    2/24:  Increased induration and erythema noted.  Ultrasound pending.  Patient afebrile without leukocytosis.  Continue IV antibiotics.      2/25:  Ultrasound obtained yesterday reveals 12.6 x 6.1 x 3.0 cm complex fluid collection to patient's medial right thigh.  General surgery consulted.  Appreciate recs.  Patient afebrile without leukocytosis.  Continue current IV antibiotic treatment.    2/26:  Patient remains afebrile without leukocytosis.  Continue antibiotic treatment.  General surgery consulted.  Appreciate recs.    2/27:  S/P I&D per general surgery this morning.  Cultures pending.  Continue current IV abx treatment.    2/28:  Afebrile without leukocytosis.  Cultures pending.  Continue current IV antibiotic treatment.    3/1:  Patient afebrile without leukocytosis.  Cultures pending.  Continue current IV antibiotic treatment.  Pain controlled.    3/2:  POD #3 s/p I&D.  Pain controlled.  Patient remains afebrile without leukocytosis.  Cultures negative and pending.  Continue current plan.

## 2025-03-02 NOTE — ASSESSMENT & PLAN NOTE
Patient's most recent potassium results are listed below.   Recent Labs     02/28/25  0549 03/01/25  0553   K 2.6* 2.7*       Plan  - Replete potassium per protocol  - Monitor potassium Daily  - Patient's hypokalemia is stable  - Nephrology following.  Appreciate recs.    3/1:  Continued hypokalemia noted.  We will adjust p.o. potassium regimen.  Additional K riders given.  Continue monitoring with daily labs.    3/2:  A.m. labs pending.  We will review once resulted.  Plan to continue scheduled p.o. replacement with additional writer's if needed.

## 2025-03-02 NOTE — PROGRESS NOTES
[facility] Medicine  Progress Note    Patient Name: Juan Carlos Yoo Sr.  MRN: [id number]  Patient Class: IP- Inpatient   Admission Date: 2/21/2025  Length of Stay: 8 days  Attending Physician: Joao Villegas III, MD  Primary Care Provider: Joao Villegas III, MD        Subjective     Principal Problem:Rhinovirus infection        HPI:  ED HPI:  Juan Carlos Yoo Sr. is a 69 y.o. male with PMHX of alcoholic cirrhosis, HCC, thrombocytopenia, Afib (not on anticoagulation due to thrombocytopenia), ESRD on HD M/W/F who presents to the emergency department C/O SOB.     Patient was recently  admitted to [facility] due to severe anasarca JAE, evaluated for hepatorenal syndrome and had 40 day hospitalization.  Patient released from rehab about a week ago.  Has endorse increasing shortness of breath for the past 3 days.  States at night it is worse when trying to sleep.  Patient lays on his side and uses 3 pillows to prop his head up.  Patient went to urgent care today because he felt like tonight was going to be another bad night and they sent him to ER.  He denies fever.  States swelling is about baseline.  Had typical dialysis session today.  He reports they were trying to take off 3 L but is unsure of how much volume was removed.  He makes urine but states it is difficult to urinate due to anasarca.  He states compliance to low-sodium diet and fluid restriction since recent discharge.  Has not had alcohol in over 7 months.    IM HPI:  Patient's chart reviewed and above history noted.  Patient's been admitted for acute on chronic dyspnea and was diagnosed with rhinovirus.  Patient had been having increasing dyspnea weakness coughing with clear sputum production over the last 36 hours and ultimately presented with worsening shortness of breath.  He has been placed on oxygen nasal cannula states he is feeling much better.  Patient has a mild cough and wheezing on exam.  Patient has a complex  medical history that is has been reviewed he seems slightly hypervolemic we will notify his nephrologist and he will likely need his routine hemodialysis if he ends up staying through Monday.  Patient is afebrile labs noted potassium low we will replete and maybe a good candidate for Aldactone.    Overview/Hospital Course:  2/23 FM:  Patient complaining of a area of redness and pain on the right thigh above the knee.  This started yesterday.  Patient's exam consistent with a area of focal cellulitis and induration.  Ordering ultrasound and starting antibiotics.  Patient's respiratory complaints are much improved and getting back to his baseline.  Patient's nephrologist aware he is in house and may need hemodialysis in a.m..    2/24 DL:  Patient doing well this morning.  He is sitting up in bed in his awake, alert, oriented.  Patient reports respiratory status improved.  Patient now off of supplemental O2.  Patient reports continued tenderness to area of redness to right inner thigh.  Redness and induration have spread past previously marked borders.  Patient is scheduled for ultrasound today.  Continue IV antibiotics.  Patient is scheduled for hemodialysis today.  Nephrology following.    2/25 DL:  Patient doing well this morning.  He is lying in bed in his awake, alert, oriented.  Respiratory status with continued improvement.  Labs and vital signs stable.  Ultrasound right thigh obtained yesterday reveals 12.6 x 6.1 x 3.0 cm complex fluid collection to the medial right thigh.  Venous ultrasound negative for DVT.  General surgery consulted.  Continue current IV antibiotics.  Renal function stable.  Nephrology following.    2/26 DL:  Patient doing well this morning.  He is lying in bed in his awake, alert, oriented.  Respiratory status at baseline.  Vital signs stable.  Patient afebrile without leukocytosis.  Continue current antibiotic treatment for abscess to right thigh.  General surgery consulted.  Appreciate  recs.  Hypokalemia and hypomagnesemia noted this morning.  We will replenish.  Nephrology following.  Appreciate recs.    2/27 DL:  Patient lying in bed resting comfortably.  He has just returned to room from OR for I and D of right inner thigh.  Patient tolerated procedure well.  Patient is afebrile without leukocytosis this morning.  We will continue current IV antibiotic treatment for right thigh abscess pending results of culture obtained per general surgery this morning.  Patient's potassium 2.8 this morning.  Continue p.o. replacement.  We will also give IV replacement today.  Magnesium pending.  Labs and vital signs otherwise stable.  Renal function stable.  Nephrology following.    2/28 DL:  Patient is sitting up on side of bed eating breakfast.  He endorses pain to right inner thigh I&D site that is controlled with current regimen.  Wound cultures with no growth to date x1 day.  Continue current antibiotic treatment.  Continued hypokalemia noted.  Continue p.o. replacement t.i.d.  We we will also give IV replacement today.  Hypomagnesemia also noted.  We will replenish today.  Renal function stable.  Nephrology following.  Labs and vital signs otherwise stable.  Patient with continued improved respiratory status.  Continue discharge planning.    3/1 DL:  Patient doing well this morning.  He is lying in bed is awake, alert, oriented.  Patient reports pain to right thigh controlled with current regimen.  Scant drainage from I&D site.  Patient is afebrile without leukocytosis.  Anaerobic culture pending.  Aerobic culture with no growth to date.  Continue current IV antibiotics.  Respiratory status stable.  Patient with continued hypokalemia.  We will switch p.o. potassium to effervescent potassium.  Additional K riders given.  Continue monitoring with daily labs.  Continue discharge planning.      3/2 DL:  Patient lying bed awake oriented.  POD #3 I&D right thigh.  He reports pain to right thigh controlled.   Patient states he has been able to ambulate in the room without difficulty.  Scant serous drainage noted from Penrose drain.  Wound cultures with no growth and pending.  Continue current IV antibiotics.  Patient afebrile without leukocytosis.  Remaining labs pending.  We will follow up once completed and continue to replace K hypokalemia continues.  Continue current treatment plan.  Continue discharge planning.    Past Medical History:   Diagnosis Date    Atrial fibrillation     Bulging disc     Cirrhosis     Colon polyp 12/29/2017    Rpt 5 yrs    EV (esophageal varices)     Hypertension 03/30/2023    Renal disorder     Skin cancer 03/2017    Thrombocytopenia        Past Surgical History:   Procedure Laterality Date    CATHETERIZATION OF BOTH LEFT AND RIGHT HEART N/A 2/8/2023    Procedure: CATHETERIZATION, HEART, BOTH LEFT AND RIGHT;  Surgeon: Flo Malone MD;  Location: [facility] CATH LAB;  Service: Cardiology;  Laterality: N/A;  low bleeding risk 1.0%    CHOLECYSTECTOMY      COLONOSCOPY      COLONOSCOPY N/A 12/29/2017    ta rpt 2022    CORONARY ANGIOGRAPHY N/A 2/8/2023    Procedure: ANGIOGRAM, CORONARY ARTERY;  Surgeon: Flo Malone MD;  Location: [facility] CATH LAB;  Service: Cardiology;  Laterality: N/A;    HERNIA REPAIR      INCISION AND DRAINAGE Right 2/27/2025    Procedure: Incision and Drainage, thigh;  Surgeon: Kayla Foster MD;  Location: [facility];  Service: General;  Laterality: Right;  Plan to go first if pt has cardiac clearance - SB    SKIN BIOPSY  03/2017    TONSILLECTOMY      UPPER GASTROINTESTINAL ENDOSCOPY  2011    EV    UPPER GASTROINTESTINAL ENDOSCOPY  2016    VASECTOMY         Review of patient's allergies indicates:  No Known Allergies    No current facility-administered medications on file prior to encounter.     Current Outpatient Medications on File Prior to Encounter   Medication Sig    famotidine (PEPCID) 20 MG tablet TAKE 1 TABLET BY MOUTH EVERY OTHER DAY    furosemide (LASIX) 80 MG  tablet Take 80 mg by mouth every Tuesday, Thursday, Saturday, .    lactulose (CHRONULAC) 20 gram/30 mL Soln Take 45 mLs (30 g total) by mouth 3 (three) times daily. TITRATE TO 3-4 BOWEL MOVEMENTS DAILY.    metoprolol tartrate (LOPRESSOR) 25 MG tablet TAKE 0.5 TABLETS BY MOUTH 2 TIMES DAILY.    midodrine (PROAMATINE) 5 MG Tab Take 3 tablets (15 mg total) by mouth every 8 (eight) hours.    potassium chloride (K-TAB) 20 mEq SMARTSI Tablet(s) By Mouth    tamsulosin (FLOMAX) 0.4 mg Cap Take by mouth.    tuberculin,purif.prot.deriv. (TUBERSOL IDRM) Inject 0.1 mLs into the skin.     Family History       Problem Relation (Age of Onset)    Cancer Maternal Uncle    Heart disease Maternal Uncle    Hyperlipidemia Brother    Ovarian cancer Sister          Tobacco Use    Smoking status: Never     Passive exposure: Never    Smokeless tobacco: Never   Substance and Sexual Activity    Alcohol use: Not Currently     Alcohol/week: 0.0 standard drinks of alcohol    Drug use: No    Sexual activity: Not Currently     Review of Systems   Constitutional:  Positive for fatigue. Negative for activity change, appetite change, chills and fever.   HENT:  Negative for ear pain, mouth sores, nosebleeds and sore throat.    Eyes:  Negative for visual disturbance.   Respiratory:  Positive for cough and wheezing. Negative for chest tightness, shortness of breath and stridor.    Cardiovascular:  Positive for leg swelling. Negative for chest pain and palpitations.   Gastrointestinal:  Negative for abdominal distention, abdominal pain, anal bleeding, blood in stool, constipation, diarrhea, nausea, rectal pain and vomiting.   Endocrine: Negative for polyphagia.   Genitourinary:  Positive for difficulty urinating. Negative for dysuria, flank pain and frequency.   Musculoskeletal:  Positive for gait problem. Negative for myalgias.   Skin:  Positive for color change and rash.   Neurological:  Positive for weakness. Negative for dizziness, tremors,  seizures, syncope, facial asymmetry, speech difficulty and headaches.   Hematological:  Negative for adenopathy.   Psychiatric/Behavioral:  Positive for confusion. Negative for agitation and hallucinations. The patient is not nervous/anxious.      Objective:     Vital Signs (Most Recent):  Temp: 97.7 °F (36.5 °C) (03/02/25 0634)  Pulse: 94 (03/02/25 0736)  Resp: 20 (03/02/25 0736)  BP: (!) 112/56 (03/02/25 0634)  SpO2: 97 % (03/02/25 0736) Vital Signs (24h Range):  Temp:  [97.7 °F (36.5 °C)-98 °F (36.7 °C)] 97.7 °F (36.5 °C)  Pulse:  [83-94] 94  Resp:  [18-20] 20  SpO2:  [94 %-99 %] 97 %  BP: (100-112)/(56-62) 112/56     Weight: 131.5 kg (290 lb)  Body mass index is 38.26 kg/m².     Physical Exam  Vitals and nursing note reviewed.   Constitutional:       General: He is not in acute distress.     Appearance: He is obese. He is not ill-appearing, toxic-appearing or diaphoretic.   HENT:      Head: Normocephalic and atraumatic.      Right Ear: External ear normal.      Left Ear: External ear normal.      Nose: Nose normal. No rhinorrhea.      Mouth/Throat:      Mouth: Mucous membranes are moist.      Pharynx: No oropharyngeal exudate or posterior oropharyngeal erythema.   Eyes:      General: Scleral icterus present.      Extraocular Movements: Extraocular movements intact.   Neck:      Vascular: No carotid bruit.   Cardiovascular:      Rate and Rhythm: Normal rate and regular rhythm.      Heart sounds: Normal heart sounds. No murmur heard.     No friction rub. No gallop.   Pulmonary:      Effort: Pulmonary effort is normal. No respiratory distress.      Breath sounds: No stridor. Wheezing and rhonchi present. No rales.   Chest:      Chest wall: No tenderness.   Abdominal:      General: There is no distension.      Palpations: Abdomen is soft. There is no mass.      Tenderness: There is no abdominal tenderness. There is no right CVA tenderness, left CVA tenderness, guarding or rebound.      Hernia: No hernia is present.    Musculoskeletal:         General: No swelling, tenderness or deformity.      Cervical back: No rigidity.      Right lower leg: No edema.      Left lower leg: No edema.      Comments: Right thigh penrose in place with serous drainage. Counter incisions clean and dry without exudate.    Lymphadenopathy:      Cervical: No cervical adenopathy.   Skin:     General: Skin is warm and dry.      Capillary Refill: Capillary refill takes less than 2 seconds.      Coloration: Skin is jaundiced.      Findings: Erythema and rash present.             Comments: 12 x 10 cm area of erythema and fluctuance to medial right thigh s/p I&D this morning.  ACE wrap in place without drainage.   Neurological:      Mental Status: He is alert. Mental status is at baseline.      Cranial Nerves: No cranial nerve deficit.      Sensory: No sensory deficit.      Motor: Weakness present.      Coordination: Coordination normal.   Psychiatric:         Mood and Affect: Mood normal.         Behavior: Behavior normal.         Thought Content: Thought content normal.         Judgment: Judgment normal.                Significant Labs: All pertinent labs within the past 24 hours have been reviewed.  Recent Lab Results         03/02/25  0545        Baso # 0.07       Basophil % 1.4       Differential Method Automated       Eos # 0.1       Eos % 1.6       Gran # (ANC) 3.3       Gran % 63.0       Hematocrit 32.5       Hemoglobin 10.6       Immature Grans (Abs) 0.06  Comment: Mild elevation in immature granulocytes is non specific and   can be seen in a variety of conditions including stress response,   acute inflammation, trauma and pregnancy. Correlation with other   laboratory and clinical findings is essential.         Immature Granulocytes 1.2       Lymph # 1.0       Lymph % 19.6       MCH 30.0       MCHC 32.6       MCV 92       Mono # 0.7       Mono % 13.2       MPV 11.6       nRBC 0       Platelet Count 100       RBC 3.53       RDW 18.9       WBC 5.16     "           Significant Imaging: I have reviewed all pertinent imaging results/findings within the past 24 hours.    Assessment and Plan     * Rhinovirus infection  Conservative care, + now with O2 needs.    2/24:  Continue conservative care.  Symptoms improving.  Patient with stable O2 sats on room air.    2/27:  Stable.  Continue conservative care.    3/2:  Resolved      Hypomagnesemia  Patient has Abnormal Magnesium: hypomagnesemia. Will continue to monitor electrolytes closely. Will replace the affected electrolytes and repeat labs to be done after interventions completed. The patient's magnesium results have been reviewed and are listed below.  No results for input(s): "MG" in the last 24 hours.       Cellulitis of right lower extremity  2/23 FM:  Starting abx, get US.    2/24:  Increased induration and erythema noted.  Ultrasound pending.  Patient afebrile without leukocytosis.  Continue IV antibiotics.      2/25:  Ultrasound obtained yesterday reveals 12.6 x 6.1 x 3.0 cm complex fluid collection to patient's medial right thigh.  General surgery consulted.  Appreciate recs.  Patient afebrile without leukocytosis.  Continue current IV antibiotic treatment.    2/26:  Patient remains afebrile without leukocytosis.  Continue antibiotic treatment.  General surgery consulted.  Appreciate recs.    2/27:  S/P I&D per general surgery this morning.  Cultures pending.  Continue current IV abx treatment.    2/28:  Afebrile without leukocytosis.  Cultures pending.  Continue current IV antibiotic treatment.    3/1:  Patient afebrile without leukocytosis.  Cultures pending.  Continue current IV antibiotic treatment.  Pain controlled.    3/2:  POD #3 s/p I&D.  Pain controlled.  Patient remains afebrile without leukocytosis.  Cultures negative and pending.  Continue current plan.        Hypokalemia  Patient's most recent potassium results are listed below.   Recent Labs     02/28/25  0549 03/01/25  0553   K 2.6* 2.7* "       Plan  - Replete potassium per protocol  - Monitor potassium Daily  - Patient's hypokalemia is stable  - Nephrology following.  Appreciate recs.    3/1:  Continued hypokalemia noted.  We will adjust p.o. potassium regimen.  Additional K riders given.  Continue monitoring with daily labs.    3/2:  A.m. labs pending.  We will review once resulted.  Plan to continue scheduled p.o. replacement with additional writer's if needed.    Morbid obesity  Body mass index is 38.26 kg/m². Morbid obesity complicates all aspects of disease management from diagnostic modalities to treatment. Weight loss encouraged and health benefits explained to patient.         Moderate protein-calorie malnutrition  Nutrition consulted. Most recent weight and BMI monitored-     Measurements:  Wt Readings from Last 1 Encounters:   02/21/25 131.5 kg (290 lb)   Body mass index is 38.26 kg/m².    Patient has been screened and assessed by RD.    Malnutrition Type:  Context:    Level:      Malnutrition Characteristic Summary:       Interventions/Recommendations (treatment strategy):         Hypotension  Blood pressure stable.  Continue monitoring.      Hepatic encephalopathy  Continue lactulose.      CKD (chronic kidney disease) stage 4, GFR 15-29 ml/min  Creatine stable for now. BMP reviewed- noted Estimated Creatinine Clearance: 53.5 mL/min (A) (based on SCr of 1.8 mg/dL (H)). according to latest data. Based on current GFR, CKD stage is stage 5 - GFR < 15.  Monitor UOP and serial BMP and adjust therapy as needed. Renally dose meds. Avoid nephrotoxic medications and procedures.    Per renal, currently on MWF HD, ? Still needs this?    Suspected Hepatorenal syndrome  Slightly hypervolemic today, much improved from past care.      Longstanding persistent atrial fibrillation  Patient has long standing persistent (>12 months) atrial fibrillation. Patient is currently in atrial fibrillation. [id number] Score: 1. The patients heart rate in the last 24  hours is as follows:  Pulse  Min: 83  Max: 94     Antiarrhythmics  metoprolol tartrate (LOPRESSOR) tablet 25 mg, 2 times daily, Oral    Anticoagulants       Plan  - Replete lytes with a goal of K>4, Mg >2  - Patient is anticoagulated, see medications listed above.  - Patient's afib is currently controlled    Auto anticoagulated with CLD.        Coagulopathy  CLD      Cirrhosis  Patient with known Cirrhosis with Child's class C. Co-morbidities are present and inclusive of portal hypertension, hepatic encephalopathy, malnutrition, anemia/pancytopenia, and anticoagulation.  MELD-Na score calculated; MELD 3.0: 28 at 2/23/2025  5:15 AM  MELD-Na: 26 at 2/23/2025  5:15 AM  Calculated from:  Serum Creatinine: 1.8 mg/dL at 2/23/2025  5:15 AM  Serum Sodium: 133 mmol/L at 2/23/2025  5:15 AM  Total Bilirubin: 3.9 mg/dL at 2/23/2025  5:15 AM  Serum Albumin: 2.1 g/dL at 2/23/2025  5:15 AM  INR(ratio): 1.9 at 2/21/2025  6:42 PM  Age at listing (hypothetical): 69 years  Sex: Male at 2/23/2025  5:15 AM      Continue chronic meds. Etiology likely ETOH. Will avoid any hepatotoxic meds, and monitor CBC/CMP/INR for synthetic function.       VTE Risk Mitigation (From admission, onward)           Ordered     Place sequential compression device  Until discontinued         02/22/25 1002     IP VTE HIGH RISK PATIENT  Once         02/21/25 2204     Place ANDREW hose  Until discontinued         02/21/25 2204                    Discharge Planning   AUTUMN:      Code Status: Full Code   Medical Readiness for Discharge Date:   Discharge Plan A: Home   Discharge Delays: None known at this time            Please place Justification for DME        Pilar Gates NP  Department of Hospital Medicine   [facility]

## 2025-03-02 NOTE — ASSESSMENT & PLAN NOTE
Patient has long standing persistent (>12 months) atrial fibrillation. Patient is currently in atrial fibrillation. [id number] Score: 1. The patients heart rate in the last 24 hours is as follows:  Pulse  Min: 83  Max: 94     Antiarrhythmics  metoprolol tartrate (LOPRESSOR) tablet 25 mg, 2 times daily, Oral    Anticoagulants       Plan  - Replete lytes with a goal of K>4, Mg >2  - Patient is anticoagulated, see medications listed above.  - Patient's afib is currently controlled    Auto anticoagulated with CLD.

## 2025-03-03 LAB
ALBUMIN SERPL BCP-MCNC: 1.9 G/DL (ref 3.5–5.2)
ALP SERPL-CCNC: 68 U/L (ref 55–135)
ALT SERPL W/O P-5'-P-CCNC: 16 U/L (ref 10–44)
ANION GAP SERPL CALC-SCNC: 9 MMOL/L (ref 8–16)
AST SERPL-CCNC: 55 U/L (ref 10–40)
BACTERIA SPEC AEROBE CULT: NO GROWTH
BASOPHILS # BLD AUTO: 0.06 K/UL (ref 0–0.2)
BASOPHILS NFR BLD: 1.2 % (ref 0–1.9)
BILIRUB SERPL-MCNC: 2.9 MG/DL (ref 0.1–1)
BUN SERPL-MCNC: 25 MG/DL (ref 8–23)
CALCIUM SERPL-MCNC: 8 MG/DL (ref 8.7–10.5)
CHLORIDE SERPL-SCNC: 90 MMOL/L (ref 95–110)
CO2 SERPL-SCNC: 34 MMOL/L (ref 23–29)
CREAT SERPL-MCNC: 1.7 MG/DL (ref 0.5–1.4)
DIFFERENTIAL METHOD BLD: ABNORMAL
EOSINOPHIL # BLD AUTO: 0.2 K/UL (ref 0–0.5)
EOSINOPHIL NFR BLD: 3.6 % (ref 0–8)
ERYTHROCYTE [DISTWIDTH] IN BLOOD BY AUTOMATED COUNT: 18.6 % (ref 11.5–14.5)
EST. GFR  (NO RACE VARIABLE): 42.6 ML/MIN/1.73 M^2
GLUCOSE SERPL-MCNC: 88 MG/DL (ref 70–110)
HCT VFR BLD AUTO: 28.7 % (ref 40–54)
HGB BLD-MCNC: 9.5 G/DL (ref 14–18)
IMM GRANULOCYTES # BLD AUTO: 0.05 K/UL (ref 0–0.04)
IMM GRANULOCYTES NFR BLD AUTO: 1 % (ref 0–0.5)
LYMPHOCYTES # BLD AUTO: 1.1 K/UL (ref 1–4.8)
LYMPHOCYTES NFR BLD: 22.2 % (ref 18–48)
MAGNESIUM SERPL-MCNC: 1.3 MG/DL (ref 1.6–2.6)
MCH RBC QN AUTO: 30.4 PG (ref 27–31)
MCHC RBC AUTO-ENTMCNC: 33.1 G/DL (ref 32–36)
MCV RBC AUTO: 92 FL (ref 82–98)
MONOCYTES # BLD AUTO: 0.8 K/UL (ref 0.3–1)
MONOCYTES NFR BLD: 16.2 % (ref 4–15)
NEUTROPHILS # BLD AUTO: 2.8 K/UL (ref 1.8–7.7)
NEUTROPHILS NFR BLD: 55.8 % (ref 38–73)
NRBC BLD-RTO: 0 /100 WBC
PLATELET # BLD AUTO: 90 K/UL (ref 150–450)
PMV BLD AUTO: 12.4 FL (ref 9.2–12.9)
POTASSIUM SERPL-SCNC: 3.3 MMOL/L (ref 3.5–5.1)
PROT SERPL-MCNC: 6.1 G/DL (ref 6–8.4)
RBC # BLD AUTO: 3.13 M/UL (ref 4.6–6.2)
SODIUM SERPL-SCNC: 133 MMOL/L (ref 136–145)
WBC # BLD AUTO: 4.95 K/UL (ref 3.9–12.7)

## 2025-03-03 PROCEDURE — 11000001 HC ACUTE MED/SURG PRIVATE ROOM

## 2025-03-03 PROCEDURE — 25000003 PHARM REV CODE 250

## 2025-03-03 PROCEDURE — 25000003 PHARM REV CODE 250: Performed by: STUDENT IN AN ORGANIZED HEALTH CARE EDUCATION/TRAINING PROGRAM

## 2025-03-03 PROCEDURE — 94640 AIRWAY INHALATION TREATMENT: CPT

## 2025-03-03 PROCEDURE — 97530 THERAPEUTIC ACTIVITIES: CPT

## 2025-03-03 PROCEDURE — 27000207 HC ISOLATION

## 2025-03-03 PROCEDURE — 63600175 PHARM REV CODE 636 W HCPCS

## 2025-03-03 PROCEDURE — 99900031 HC PATIENT EDUCATION (STAT)

## 2025-03-03 PROCEDURE — 94761 N-INVAS EAR/PLS OXIMETRY MLT: CPT

## 2025-03-03 PROCEDURE — 85025 COMPLETE CBC W/AUTO DIFF WBC: CPT | Performed by: STUDENT IN AN ORGANIZED HEALTH CARE EDUCATION/TRAINING PROGRAM

## 2025-03-03 PROCEDURE — 36415 COLL VENOUS BLD VENIPUNCTURE: CPT | Performed by: STUDENT IN AN ORGANIZED HEALTH CARE EDUCATION/TRAINING PROGRAM

## 2025-03-03 PROCEDURE — 83735 ASSAY OF MAGNESIUM: CPT | Performed by: STUDENT IN AN ORGANIZED HEALTH CARE EDUCATION/TRAINING PROGRAM

## 2025-03-03 PROCEDURE — 99024 POSTOP FOLLOW-UP VISIT: CPT | Mod: ,,,

## 2025-03-03 PROCEDURE — 97110 THERAPEUTIC EXERCISES: CPT

## 2025-03-03 PROCEDURE — 99900035 HC TECH TIME PER 15 MIN (STAT)

## 2025-03-03 PROCEDURE — 80053 COMPREHEN METABOLIC PANEL: CPT | Performed by: STUDENT IN AN ORGANIZED HEALTH CARE EDUCATION/TRAINING PROGRAM

## 2025-03-03 PROCEDURE — 25000242 PHARM REV CODE 250 ALT 637 W/ HCPCS: Performed by: STUDENT IN AN ORGANIZED HEALTH CARE EDUCATION/TRAINING PROGRAM

## 2025-03-03 RX ORDER — MAGNESIUM SULFATE HEPTAHYDRATE 40 MG/ML
2 INJECTION, SOLUTION INTRAVENOUS ONCE
Status: COMPLETED | OUTPATIENT
Start: 2025-03-03 | End: 2025-03-03

## 2025-03-03 RX ORDER — ELECTROLYTES/DEXTROSE
200 SOLUTION, ORAL ORAL
Status: DISCONTINUED | OUTPATIENT
Start: 2025-03-03 | End: 2025-03-05 | Stop reason: HOSPADM

## 2025-03-03 RX ORDER — POTASSIUM CHLORIDE 7.45 MG/ML
10 INJECTION INTRAVENOUS
Status: COMPLETED | OUTPATIENT
Start: 2025-03-03 | End: 2025-03-03

## 2025-03-03 RX ADMIN — POTASSIUM BICARBONATE 50 MEQ: 978 TABLET, EFFERVESCENT ORAL at 09:03

## 2025-03-03 RX ADMIN — POTASSIUM BICARBONATE 50 MEQ: 978 TABLET, EFFERVESCENT ORAL at 03:03

## 2025-03-03 RX ADMIN — MAGNESIUM 64 MG (MAGNESIUM CHLORIDE) TABLET,DELAYED RELEASE 128 MG: at 09:03

## 2025-03-03 RX ADMIN — MIDODRINE HYDROCHLORIDE 5 MG: 5 TABLET ORAL at 03:03

## 2025-03-03 RX ADMIN — POTASSIUM CHLORIDE 10 MEQ: 7.46 INJECTION, SOLUTION INTRAVENOUS at 09:03

## 2025-03-03 RX ADMIN — Medication 200 ML: at 08:03

## 2025-03-03 RX ADMIN — TRAMADOL HYDROCHLORIDE 50 MG: 50 TABLET, COATED ORAL at 09:03

## 2025-03-03 RX ADMIN — LEVALBUTEROL 1.25 MG: 1.25 SOLUTION, CONCENTRATE RESPIRATORY (INHALATION) at 03:03

## 2025-03-03 RX ADMIN — LACTULOSE 20 G: 20 SOLUTION ORAL at 09:03

## 2025-03-03 RX ADMIN — LEVALBUTEROL 1.25 MG: 1.25 SOLUTION, CONCENTRATE RESPIRATORY (INHALATION) at 11:03

## 2025-03-03 RX ADMIN — TAMSULOSIN HYDROCHLORIDE 0.4 MG: 0.4 CAPSULE ORAL at 09:03

## 2025-03-03 RX ADMIN — MIDODRINE HYDROCHLORIDE 5 MG: 5 TABLET ORAL at 09:03

## 2025-03-03 RX ADMIN — MAGNESIUM SULFATE IN WATER 2 G: 40 INJECTION, SOLUTION INTRAVENOUS at 09:03

## 2025-03-03 RX ADMIN — Medication 100 MG: at 09:03

## 2025-03-03 RX ADMIN — MIDODRINE HYDROCHLORIDE 5 MG: 5 TABLET ORAL at 06:03

## 2025-03-03 RX ADMIN — FAMOTIDINE 20 MG: 20 TABLET, FILM COATED ORAL at 09:03

## 2025-03-03 RX ADMIN — LEVALBUTEROL 1.25 MG: 1.25 SOLUTION, CONCENTRATE RESPIRATORY (INHALATION) at 06:03

## 2025-03-03 RX ADMIN — METOPROLOL TARTRATE 25 MG: 25 TABLET, FILM COATED ORAL at 09:03

## 2025-03-03 NOTE — PROGRESS NOTES
[facility]  Adult Nutrition  Progress Note    SUMMARY       Recommendations  1. Rec'd Renal Cardiac diet.   2. Rec'd ONS: Nepro or Novasource Renal TID to provide additional nutrition.   3. Rec'd Beneprotein TID.    4. Rec'd Lalito BID to promote wound healing and to provide additional nutrition.   5. RD to follow and mkae rec's accordingly.  Goals:   1. Pt will consume > 75% EEN/EPN by next RD follow up.  Nutrition Goal Status: new  Communication of RD Recs: other (Documented in POC)    Nutrition Discharge Planning    Nutrition Discharge Planning: Too early to determine, pending clinical course    Assessment and Plan    Nutrition Problem  Inadequate protein intake    Related to (etiology):   Increased nutrient needs (protein) secondary to wounds and hx of hemodialysis    Signs and Symptoms (as evidenced by):   Daily protein intake < estimated protein needs as per 24 hr recall, delayed wound healing     Interventions/Recommendations (treatment strategy):  1. Rec'd Renal Cardiac diet.   2. Rec'd ONS: Nepro or Novasource Renal TID to provide additional nutrition.   3. Rec'd Beneprotein TID.    4. Rec'd Lalito BID to promote wound healing and to provide additional nutrition.   5. RD to follow and mkae rec's accordingly.    Nutrition Diagnosis Status:   New    Malnutrition Assessment  NFPE not warranted at this time. PT does not meet positive criteria for malnutrition.    Nutrition Related Social Determinants of Health:  None identified at this time.    Reason for Assessment    Reason For Assessment: length of stay  Diagnosis: other (see comments) (Rhinovirus infection)  General Information Comments: RD triggered for length of stay. Pt is tolerating a Renal diet with a good appetite. Pt is consuming 100% of meals. Pt is on dialysis and has increased nutrient needs. Also noted pt's wound. Will provide rec's to fill any nutritional gaps. RD to follow and make rec's accordingly.    Nutrition/Diet  "History    Nutrition Intake History: Renal diet  Food Preferences: None  Spiritual, Cultural Beliefs, Moravian Practices, Values that Affect Care: no  Food Allergies: NKFA  Factors Affecting Nutritional Intake: None identified at this time    Anthropometrics    Height: 6' 1" (185.4 cm)  Height (inches): 73 in  Height Method: Stated  Weight: 131.5 kg (289 lb 14.5 oz)  Weight (lb): 289.91 lb  Weight Method: Bed Scale  Ideal Body Weight (IBW), Male: 184 lb  % Ideal Body Weight, Male (lb): 157.56 %  BMI (Calculated): 38.3  BMI Grade: 35 - 39.9 - obesity - grade II    Lab/Procedures/Meds    Pertinent Labs Reviewed: reviewed  Pertinent Labs Comments: GFR = 42.6, Sodium = 133, Potassium = 3.3, BUN = 25, Creatinine = 1.7, Magnesium = 1.3, Albumin = 1.9  Pertinent Medications Reviewed: reviewed    Estimated/Assessed Needs    Weight Used For Calorie Calculations: 95.4 kg (210 lb 4 oz) (AdjBW)  Energy Calorie Requirements (kcal): 2861 (30kcal/kg AdjBW)  Energy Need Method: Kcal/kg  Protein Requirements: 100-125 (1.2-1.5g/kg IBW)  Weight Used For Protein Calculations: 83.5 kg (184 lb) (IBW)  Fluid Requirements (mL): 2861 (1mL/kcal)  Estimated Fluid Requirement Method: RDA Method  RDA Method (mL): 2861    Nutrition Prescription Ordered    Current Diet Order: Renal on Dialysis  Oral Nutrition Supplement: None    Evaluation of Received Nutrient/Fluid Intake    % Kcal Needs: 50%  % Protein Needs: 50%  I/O: +240  Energy Calories Required: not meeting needs  Protein Required: not meeting needs  Tolerance: tolerating  % Intake of Estimated Energy Needs: 25 - 50 %  % Meal Intake: 75 - 100 %    Nutrition Risk    Level of Risk/Frequency of Follow-up: moderate     Monitor and Evaluation    Monitor and Evaluation: Energy intake, Protein intake, Food and beverage intake, Diet order, Food and nutrition knowledge, Weight, Beliefs and attitudes, Gastrointestinal profile, Electrolyte and renal panel, Glucose/endocrine profile, Inflammatory " profile, Lipid profile     Nutrition Follow-Up    RD Follow-up?: Yes

## 2025-03-03 NOTE — SUBJECTIVE & OBJECTIVE
Past Medical History:   Diagnosis Date    Atrial fibrillation     Bulging disc     Cirrhosis     Colon polyp 12/29/2017    Rpt 5 yrs    EV (esophageal varices)     Hypertension 03/30/2023    Renal disorder     Skin cancer 03/2017    Thrombocytopenia        Past Surgical History:   Procedure Laterality Date    CATHETERIZATION OF BOTH LEFT AND RIGHT HEART N/A 2/8/2023    Procedure: CATHETERIZATION, HEART, BOTH LEFT AND RIGHT;  Surgeon: Flo Malone MD;  Location: [facility] CATH LAB;  Service: Cardiology;  Laterality: N/A;  low bleeding risk 1.0%    CHOLECYSTECTOMY      COLONOSCOPY      COLONOSCOPY N/A 12/29/2017    ta rpt 2022    CORONARY ANGIOGRAPHY N/A 2/8/2023    Procedure: ANGIOGRAM, CORONARY ARTERY;  Surgeon: Flo Malone MD;  Location: [facility] CATH LAB;  Service: Cardiology;  Laterality: N/A;    HERNIA REPAIR      INCISION AND DRAINAGE Right 2/27/2025    Procedure: Incision and Drainage, thigh;  Surgeon: Kayla Foster MD;  Location: [facility];  Service: General;  Laterality: Right;  Plan to go first if pt has cardiac clearance - SB    SKIN BIOPSY  03/2017    TONSILLECTOMY      UPPER GASTROINTESTINAL ENDOSCOPY  2011    EV    UPPER GASTROINTESTINAL ENDOSCOPY  2016    VASECTOMY         Review of patient's allergies indicates:  No Known Allergies    No current facility-administered medications on file prior to encounter.     Current Outpatient Medications on File Prior to Encounter   Medication Sig    famotidine (PEPCID) 20 MG tablet TAKE 1 TABLET BY MOUTH EVERY OTHER DAY    furosemide (LASIX) 80 MG tablet Take 80 mg by mouth every Tuesday, Thursday, Saturday, Sunday.    lactulose (CHRONULAC) 20 gram/30 mL Soln Take 45 mLs (30 g total) by mouth 3 (three) times daily. TITRATE TO 3-4 BOWEL MOVEMENTS DAILY.    metoprolol tartrate (LOPRESSOR) 25 MG tablet TAKE 0.5 TABLETS BY MOUTH 2 TIMES DAILY.    midodrine (PROAMATINE) 5 MG Tab Take 3 tablets (15 mg total) by mouth every 8 (eight) hours.    potassium chloride  (K-TAB) 20 mEq SMARTSI Tablet(s) By Mouth    tamsulosin (FLOMAX) 0.4 mg Cap Take by mouth.    tuberculin,purif.prot.deriv. (TUBERSOL IDRM) Inject 0.1 mLs into the skin.     Family History       Problem Relation (Age of Onset)    Cancer Maternal Uncle    Heart disease Maternal Uncle    Hyperlipidemia Brother    Ovarian cancer Sister          Tobacco Use    Smoking status: Never     Passive exposure: Never    Smokeless tobacco: Never   Substance and Sexual Activity    Alcohol use: Not Currently     Alcohol/week: 0.0 standard drinks of alcohol    Drug use: No    Sexual activity: Not Currently     Review of Systems   Constitutional:  Positive for fatigue. Negative for activity change, appetite change, chills and fever.   HENT:  Negative for ear pain, mouth sores, nosebleeds and sore throat.    Eyes:  Negative for visual disturbance.   Respiratory:  Positive for cough and wheezing. Negative for chest tightness, shortness of breath and stridor.    Cardiovascular:  Positive for leg swelling. Negative for chest pain and palpitations.   Gastrointestinal:  Negative for abdominal distention, abdominal pain, anal bleeding, blood in stool, constipation, diarrhea, nausea, rectal pain and vomiting.   Endocrine: Negative for polyphagia.   Genitourinary:  Positive for difficulty urinating. Negative for dysuria, flank pain and frequency.   Musculoskeletal:  Positive for gait problem. Negative for myalgias.   Skin:  Positive for color change and rash.   Neurological:  Positive for weakness. Negative for dizziness, tremors, seizures, syncope, facial asymmetry, speech difficulty and headaches.   Hematological:  Negative for adenopathy.   Psychiatric/Behavioral:  Positive for confusion. Negative for agitation and hallucinations. The patient is not nervous/anxious.      Objective:     Vital Signs (Most Recent):  Temp: 97.8 °F (36.6 °C) (25)  Pulse: 87 (25)  Resp: 18 (25)  BP: (!) 107/56 (25  0416)  SpO2: 96 % (03/03/25 0416) Vital Signs (24h Range):  Temp:  [97.7 °F (36.5 °C)-98.2 °F (36.8 °C)] 97.8 °F (36.6 °C)  Pulse:  [80-94] 87  Resp:  [18-20] 18  SpO2:  [96 %-100 %] 96 %  BP: (100-143)/(50-70) 107/56     Weight: 131.5 kg (290 lb)  Body mass index is 38.26 kg/m².     Physical Exam  Vitals and nursing note reviewed.   Constitutional:       General: He is not in acute distress.     Appearance: He is obese. He is not ill-appearing, toxic-appearing or diaphoretic.   HENT:      Head: Normocephalic and atraumatic.      Right Ear: External ear normal.      Left Ear: External ear normal.      Nose: Nose normal. No rhinorrhea.      Mouth/Throat:      Mouth: Mucous membranes are moist.      Pharynx: No oropharyngeal exudate or posterior oropharyngeal erythema.   Eyes:      General: Scleral icterus present.      Extraocular Movements: Extraocular movements intact.   Neck:      Vascular: No carotid bruit.   Cardiovascular:      Rate and Rhythm: Normal rate and regular rhythm.      Heart sounds: Normal heart sounds. No murmur heard.     No friction rub. No gallop.   Pulmonary:      Effort: Pulmonary effort is normal. No respiratory distress.      Breath sounds: No stridor. Wheezing and rhonchi present. No rales.   Chest:      Chest wall: No tenderness.   Abdominal:      General: There is no distension.      Palpations: Abdomen is soft. There is no mass.      Tenderness: There is no abdominal tenderness. There is no right CVA tenderness, left CVA tenderness, guarding or rebound.      Hernia: No hernia is present.   Musculoskeletal:         General: No swelling, tenderness or deformity.      Cervical back: No rigidity.      Right lower leg: No edema.      Left lower leg: No edema.      Comments: Right thigh penrose in place with serous drainage. Counter incisions clean and dry without exudate.    Lymphadenopathy:      Cervical: No cervical adenopathy.   Skin:     General: Skin is warm and dry.      Capillary  Refill: Capillary refill takes less than 2 seconds.      Coloration: Skin is jaundiced.      Findings: Erythema and rash present.             Comments: 12 x 10 cm area of erythema and fluctuance to medial right thigh s/p I&D this morning.  ACE wrap in place without drainage.   Neurological:      Mental Status: He is alert. Mental status is at baseline.      Cranial Nerves: No cranial nerve deficit.      Sensory: No sensory deficit.      Motor: Weakness present.      Coordination: Coordination normal.   Psychiatric:         Mood and Affect: Mood normal.         Behavior: Behavior normal.         Thought Content: Thought content normal.         Judgment: Judgment normal.                Significant Labs: All pertinent labs within the past 24 hours have been reviewed.  Recent Lab Results         03/03/25  0551        Albumin 1.9       ALP 68       ALT 16       Anion Gap 9       AST 55       Baso # 0.06       Basophil % 1.2       BILIRUBIN TOTAL 2.9  Comment: For infants and newborns, interpretation of results should be based  on gestational age, weight and in agreement with clinical  observations.    Premature Infant recommended reference ranges:  Up to 24 hours.............<8.0 mg/dL  Up to 48 hours............<12.0 mg/dL  3-5 days..................<15.0 mg/dL  6-29 days.................<15.0 mg/dL         BUN 25       Calcium 8.0       Chloride 90       CO2 34       Creatinine 1.7       Differential Method Automated       eGFR 42.6       Eos # 0.2       Eos % 3.6       Glucose 88       Gran # (ANC) 2.8       Gran % 55.8       Hematocrit 28.7       Hemoglobin 9.5       Immature Grans (Abs) 0.05  Comment: Mild elevation in immature granulocytes is non specific and   can be seen in a variety of conditions including stress response,   acute inflammation, trauma and pregnancy. Correlation with other   laboratory and clinical findings is essential.         Immature Granulocytes 1.0       Lymph # 1.1       Lymph % 22.2        Magnesium  1.3       MCH 30.4       MCHC 33.1       MCV 92       Mono # 0.8       Mono % 16.2       MPV 12.4       nRBC 0       Platelet Count 90       Potassium 3.3       PROTEIN TOTAL 6.1       RBC 3.13       RDW 18.6       Sodium 133       WBC 4.95               Significant Imaging: I have reviewed all pertinent imaging results/findings within the past 24 hours.

## 2025-03-03 NOTE — PLAN OF CARE
Problem: Occupational Therapy  Goal: Occupational Therapy Goal  Description: Goals to be met by: 03/07/25     Patient will increase functional independence with ADLs by performing:    LE Dressing with Modified [place].  Toileting from toilet with Modified [place] for hygiene and clothing management.   Bathing from  shower chair/bench with Modified [place].  Toilet transfer to toilet with Modified [place].    Outcome: Progressing

## 2025-03-03 NOTE — PLAN OF CARE
Recommendations  1. Rec'd Renal Cardiac diet.   2. Rec'd ONS: Nepro or Novasource Renal TID to provide additional nutrition.   3. Rec'd Beneprotein TID.    4. Rec'd Lalito BID to promote wound healing and to provide additional nutrition.   5. RD to follow and mkae rec's accordingly.  Goals:   1. Pt will consume > 75% EEN/EPN by next RD follow up.  Nutrition Goal Status: new

## 2025-03-03 NOTE — ASSESSMENT & PLAN NOTE
Patient has Abnormal Magnesium: hypomagnesemia. Will continue to monitor electrolytes closely. Will replace the affected electrolytes and repeat labs to be done after interventions completed. The patient's magnesium results have been reviewed and are listed below.  Recent Labs   Lab 03/03/25  0551   MG 1.3*

## 2025-03-03 NOTE — PT/OT/SLP PROGRESS
Occupational Therapy   Treatment    Name: Juan Carlos Yoo Sr.  MRN: [id number]  Admitting Diagnosis:  Rhinovirus infection  4 Days Post-Op    Recommendations:     Discharge Recommendations: Low Intensity Therapy  Discharge Equipment Recommendations:  none  Barriers to discharge:  Other (Comment) (Medical status)    Assessment:     Juan Carlos Yoo Sr. is a 69 y.o. male with a medical diagnosis of Rhinovirus infection.  He presents with improved activity tolerance impacting functional performance and participation in meaningful activities. Performance deficits affecting function are weakness, impaired endurance, impaired self care skills, impaired functional mobility, impaired balance, decreased upper extremity function, decreased safety awareness, pain, decreased ROM, impaired cardiopulmonary response to activity, impaired joint extensibility, impaired muscle length.     Rehab Prognosis:  Good and Fair; patient would benefit from acute skilled OT services to address these deficits and reach maximum level of function.       Plan:     Patient to be seen 4 x/week to address the above listed problems via self-care/home management, therapeutic activities, therapeutic exercises  Plan of Care Expires: 03/07/25  Plan of Care Reviewed with: patient    Subjective     Chief Complaint: urination frequency   Patient/Family Comments/goals: Pt would like to return home.   Pain/Comfort:  Pain Rating 1: 5/10  Location - Side 1: Right  Location - Orientation 1: medial  Location 1: thigh  Pain Addressed 1: Reposition, Distraction, Nurse notified  Pain Rating Post-Intervention 1: 5/10    Objective:     Communicated with: nurse prior to session.  Patient found HOB elevated with telemetry, peripheral IV upon OT entry to room.    General Precautions: Standard, droplet    Orthopedic Precautions:N/A  Braces: N/A  Respiratory Status: Room air     Occupational Performance:     Bed Mobility:    Patient completed Rolling/Turning to Left with   independence  Patient completed Rolling/Turning to Right with independence  Patient completed Scooting/Bridging with independence  Patient completed Supine to Sit with independence  Patient completed Sit to Supine with independence     Functional Mobility/Transfers:  Patient completed Sit <> Stand Transfer with independence  with  no assistive device   Patient completed Toilet Transfer Step Transfer technique with modified independence with  grab bars  Functional Mobility: Pt ambulated 162' between surfaces without AD or LOB.     Treatment & Education:  Pt was cooperative and motivated without verbal encouragement while exhibiting positive affect. He performed bed mobility demonstrating independence without use of bedrails. Pt then participated in functional transfer retraining to / from bed and toileting demonstrating modified independence with use of grab bar from low surfaces of toilet. Next, he participated in therapeutic exercise performing 5x12 seated pushups requiring verbal and tactile cueing for technique challenging him to lift buttocks off seated surface to end range elbow extension in order to strengthen triceps brachii to improve performance with sit <> stand transitions particularly from lower surfaces. Pt ambulated 162' between surfaces without AD or LOB.     Patient left sitting edge of bed with all lines intact, call button in reach, and nurse notified    GOALS:   Multidisciplinary Problems       Occupational Therapy Goals          Problem: Occupational Therapy    Goal Priority Disciplines Outcome Interventions   Occupational Therapy Goal     OT, PT/OT Progressing    Description: Goals to be met by: 03/07/25     Patient will increase functional independence with ADLs by performing:    LE Dressing with Modified [place].  Toileting from toilet with Modified [place] for hygiene and clothing management.   Bathing from  shower chair/bench with Modified [place].  Toilet transfer to toilet  with Modified [place].                         DME Justifications:  No DME recommended requiring DME justifications    Time Tracking:     OT Date of Treatment: 03/03/25  OT Start Time: 1455  OT Stop Time: 1528  OT Total Time (min): 33 min    Billable Minutes:Therapeutic Activity 18  Therapeutic Exercise 15    OT/EDWARDO: OT     Number of EDWARDO visits since last OT visit: 1    3/3/2025

## 2025-03-03 NOTE — ASSESSMENT & PLAN NOTE
Patient has long standing persistent (>12 months) atrial fibrillation. Patient is currently in atrial fibrillation. [id number] Score: 1. The patients heart rate in the last 24 hours is as follows:  Pulse  Min: 80  Max: 94     Antiarrhythmics  metoprolol tartrate (LOPRESSOR) tablet 25 mg, 2 times daily, Oral    Anticoagulants       Plan  - Replete lytes with a goal of K>4, Mg >2  - Patient is anticoagulated, see medications listed above.  - Patient's afib is currently controlled    Auto anticoagulated with CLD.

## 2025-03-03 NOTE — SUBJECTIVE & OBJECTIVE
Interval History: NAEON.  Pain improved after drainage.      Medications:  Continuous Infusions:  Scheduled Meds:   diclofenac sodium  4 g Topical (Top) TID    electrolytes-dextrose  200 mL Oral Q4H    famotidine  20 mg Oral Daily    furosemide  80 mg Oral Daily    lactulose  30 g Oral TID    levalbuterol  1.25 mg Nebulization QID WAKE    magnesium chloride  128 mg Oral Daily    magnesium sulfate 2 g IVPB  2 g Intravenous Once    metoprolol tartrate  25 mg Oral BID    midodrine  5 mg Oral Q8H    potassium bicarbonate  50 mEq Oral TID    tamsulosin  0.4 mg Oral Daily    thiamine  100 mg Oral Daily     PRN Meds:  Current Facility-Administered Medications:     melatonin, 6 mg, Oral, Nightly PRN    ondansetron, 4 mg, Intravenous, Q8H PRN    sodium chloride 0.9%, 10 mL, Intravenous, PRN    traMADoL, 50 mg, Oral, Q6H PRN     Review of patient's allergies indicates:  No Known Allergies  Objective:     Vital Signs (Most Recent):  Temp: 97.6 °F (36.4 °C) (03/03/25 0914)  Pulse: 83 (03/03/25 1103)  Resp: 18 (03/03/25 1103)  BP: (!) 98/50 (03/03/25 0946)  SpO2: 95 % (03/03/25 1103) Vital Signs (24h Range):  Temp:  [97.6 °F (36.4 °C)-98.2 °F (36.8 °C)] 97.6 °F (36.4 °C)  Pulse:  [83-87] 83  Resp:  [18-20] 18  SpO2:  [95 %-100 %] 95 %  BP: ()/(50-70) 98/50     Weight: 131.5 kg (290 lb)  Body mass index is 38.26 kg/m².    Intake/Output - Last 3 Shifts         03/01 0700 03/02 0659 03/02 0700 03/03 0659 03/03 0700  03/04 0659    P.O. 358 720 240    Total Intake(mL/kg) 358 (2.7) 720 (5.5) 240 (1.8)    Urine (mL/kg/hr)  350 (0.1)     Stool       Total Output  350     Net +358 +370 +240           Urine Occurrence 2 x 3 x              Physical Exam  Vitals and nursing note reviewed.   Constitutional:       General: He is not in acute distress.     Appearance: He is not ill-appearing or toxic-appearing.   Cardiovascular:      Rate and Rhythm: Normal rate and regular rhythm.   Pulmonary:      Effort: Pulmonary effort is normal.  No respiratory distress.   Abdominal:      General: There is no distension.      Palpations: Abdomen is soft.      Tenderness: There is no abdominal tenderness. There is no guarding or rebound.   Musculoskeletal:      Right lower leg: Pitting Edema present.      Left lower leg: Pitting Edema present.      Comments: Right thigh penrose in place with serous drainage. Counter incisions clean and dry without exudate.    Skin:     General: Skin is warm and dry.      Capillary Refill: Capillary refill takes less than 2 seconds.   Neurological:      Mental Status: He is alert. Mental status is at baseline.          Significant Labs:  I have reviewed all pertinent lab results within the past 24 hours.  CBC:   Recent Labs   Lab 03/03/25  0551   WBC 4.95   RBC 3.13*   HGB 9.5*   HCT 28.7*   PLT 90*   MCV 92   MCH 30.4   MCHC 33.1     CMP:   Recent Labs   Lab 03/03/25  0551   GLU 88   CALCIUM 8.0*   ALBUMIN 1.9*   PROT 6.1   *   K 3.3*   CO2 34*   CL 90*   BUN 25*   CREATININE 1.7*   ALKPHOS 68   ALT 16   AST 55*   BILITOT 2.9*       Significant Diagnostics:  I have reviewed all pertinent imaging results/findings within the past 24 hours.

## 2025-03-03 NOTE — PROGRESS NOTES
[facility] Surg  General Surgery  Progress Note    Subjective:     History of Present Illness:  68yo male with history of Afib, liver cirrhosis, and ESRD who presents with right thigh abscess with cellulitis.  GS consulted for evaluation.     Hospital Course:  3/1 CP: POD#2 s/p I&D of right thigh wound. Pain controlled. Penrose in place. Serous drainage noted. OR cultures negative and in progress.   3/3 CP: POD#4 s/p I&D of right thigh wound. Penrose in place. Serous drainage noted to dressing. OR cultures with no growth. No leukocytosis.     Post-Op Info:  Procedure(s) (LRB):  Incision and Drainage, thigh (Right)   4 Days Post-Op     Interval History: NAEON.  Pain improved after drainage.      Medications:  Continuous Infusions:  Scheduled Meds:   diclofenac sodium  4 g Topical (Top) TID    electrolytes-dextrose  200 mL Oral Q4H    famotidine  20 mg Oral Daily    furosemide  80 mg Oral Daily    lactulose  30 g Oral TID    levalbuterol  1.25 mg Nebulization QID WAKE    magnesium chloride  128 mg Oral Daily    magnesium sulfate 2 g IVPB  2 g Intravenous Once    metoprolol tartrate  25 mg Oral BID    midodrine  5 mg Oral Q8H    potassium bicarbonate  50 mEq Oral TID    tamsulosin  0.4 mg Oral Daily    thiamine  100 mg Oral Daily     PRN Meds:  Current Facility-Administered Medications:     melatonin, 6 mg, Oral, Nightly PRN    ondansetron, 4 mg, Intravenous, Q8H PRN    sodium chloride 0.9%, 10 mL, Intravenous, PRN    traMADoL, 50 mg, Oral, Q6H PRN     Review of patient's allergies indicates:  No Known Allergies  Objective:     Vital Signs (Most Recent):  Temp: 97.6 °F (36.4 °C) (03/03/25 0914)  Pulse: 83 (03/03/25 1103)  Resp: 18 (03/03/25 1103)  BP: (!) 98/50 (03/03/25 0946)  SpO2: 95 % (03/03/25 1103) Vital Signs (24h Range):  Temp:  [97.6 °F (36.4 °C)-98.2 °F (36.8 °C)] 97.6 °F (36.4 °C)  Pulse:  [83-87] 83  Resp:  [18-20] 18  SpO2:  [95 %-100 %] 95 %  BP: ()/(50-70) 98/50     Weight: 131.5 kg (290  lb)  Body mass index is 38.26 kg/m².    Intake/Output - Last 3 Shifts         03/01 0700  03/02 0659 03/02 0700  03/03 0659 03/03 0700  03/04 0659    P.O. 358 720 240    Total Intake(mL/kg) 358 (2.7) 720 (5.5) 240 (1.8)    Urine (mL/kg/hr)  350 (0.1)     Stool       Total Output  350     Net +358 +370 +240           Urine Occurrence 2 x 3 x              Physical Exam  Vitals and nursing note reviewed.   Constitutional:       General: He is not in acute distress.     Appearance: He is not ill-appearing or toxic-appearing.   Cardiovascular:      Rate and Rhythm: Normal rate and regular rhythm.   Pulmonary:      Effort: Pulmonary effort is normal. No respiratory distress.   Abdominal:      General: There is no distension.      Palpations: Abdomen is soft.      Tenderness: There is no abdominal tenderness. There is no guarding or rebound.   Musculoskeletal:      Right lower leg: Pitting Edema present.      Left lower leg: Pitting Edema present.      Comments: Right thigh penrose in place with serous drainage. Counter incisions clean and dry without exudate.    Skin:     General: Skin is warm and dry.      Capillary Refill: Capillary refill takes less than 2 seconds.   Neurological:      Mental Status: He is alert. Mental status is at baseline.          Significant Labs:  I have reviewed all pertinent lab results within the past 24 hours.  CBC:   Recent Labs   Lab 03/03/25  0551   WBC 4.95   RBC 3.13*   HGB 9.5*   HCT 28.7*   PLT 90*   MCV 92   MCH 30.4   MCHC 33.1     CMP:   Recent Labs   Lab 03/03/25  0551   GLU 88   CALCIUM 8.0*   ALBUMIN 1.9*   PROT 6.1   *   K 3.3*   CO2 34*   CL 90*   BUN 25*   CREATININE 1.7*   ALKPHOS 68   ALT 16   AST 55*   BILITOT 2.9*       Significant Diagnostics:  I have reviewed all pertinent imaging results/findings within the past 24 hours.  Assessment/Plan:     * Rhinovirus infection  Manage per primary. Droplet precautions.    Hypomagnesemia  Manage per primary    Cellulitis of  right lower extremity  POD #4 s/p I&D of right thigh wound   -Serous fluid in cavity indicating old infected hematoma   -Cleanse wound daily and cover with ABD pad + kerlix + ACE   -Penrose with serous drainage   -Continue IV abx per medicine - may discontinue upon discharge   -OR cultures with no growth  -OK to ambulate ad keven   -Daily wound care instructions ordered  -Surgery to sign off  -Rest of care per primary     Hypokalemia  Manage per primary    Morbid obesity  Manage per primary    Moderate protein-calorie malnutrition  Manage per primary    Hypotension  Manage per primary    Hepatic encephalopathy  Manage per primary     CKD (chronic kidney disease) stage 4, GFR 15-29 ml/min  Manage per primary    Suspected Hepatorenal syndrome  Manage per primary    Longstanding persistent atrial fibrillation  Manage per primary    Coagulopathy  Manage per primary    Cirrhosis  Manage per primary      Surgery to sign off. Re consult PRN.    KIT TURNER NP  General Surgery  [facility]

## 2025-03-03 NOTE — ASSESSMENT & PLAN NOTE
Patient's most recent potassium results are listed below.   Recent Labs     03/01/25  0553 03/02/25  0545 03/03/25  0551   K 2.7* 2.6* 3.3*       Plan  - Replete potassium per protocol  - Monitor potassium Daily  - Patient's hypokalemia is stable  - Nephrology following.  Appreciate recs.    3/1:  Continued hypokalemia noted.  We will adjust p.o. potassium regimen.  Additional K riders given.  Continue monitoring with daily labs.    3/2:  A.m. labs pending.  We will review once resulted.  Plan to continue scheduled p.o. replacement with additional rider's if needed.    3/3:  Continued hypokalemia but overall improving.  Continue p.o. and IV replacement.  Monitor with daily labs.

## 2025-03-03 NOTE — ASSESSMENT & PLAN NOTE
POD #4 s/p I&D of right thigh wound   -Serous fluid in cavity indicating old infected hematoma   -Cleanse wound daily and cover with ABD pad + kerlix + ACE   -Penrose with serous drainage   -Continue IV abx per medicine - may discontinue upon discharge   -OR cultures with no growth  -OK to ambulate ad keven   -Daily wound care instructions ordered  -Surgery to sign off  -Rest of care per primary

## 2025-03-03 NOTE — PLAN OF CARE
Plan of care reviewed with patient. Peripheral IV in place and saline locked. Pt tolerated meds well. PRN pain meds given per MD orders with relief obtained. VSS. NADN. Pt free from falls and injury. Questions and concerns addressed. Pt belongings and call bell within reach. No further requests or concerns at this time.   Problem: Adult Inpatient Plan of Care  Goal: Plan of Care Review  Outcome: Progressing  Goal: Patient-Specific Goal (Individualized)  Outcome: Progressing  Goal: Absence of Hospital-Acquired Illness or Injury  Outcome: Progressing  Goal: Optimal Comfort and Wellbeing  Outcome: Progressing  Goal: Readiness for Transition of Care  Outcome: Progressing     Problem: Acute Kidney Injury/Impairment  Goal: Fluid and Electrolyte Balance  Outcome: Progressing  Goal: Improved Oral Intake  Outcome: Progressing  Goal: Effective Renal Function  Outcome: Progressing     Problem: Infection  Goal: Absence of Infection Signs and Symptoms  Outcome: Progressing     Problem: Wound  Goal: Optimal Coping  Outcome: Progressing  Goal: Optimal Functional Ability  Outcome: Progressing  Goal: Absence of Infection Signs and Symptoms  Outcome: Progressing  Goal: Improved Oral Intake  Outcome: Progressing  Goal: Optimal Pain Control and Function  Outcome: Progressing  Goal: Skin Health and Integrity  Outcome: Progressing  Goal: Optimal Wound Healing  Outcome: Progressing

## 2025-03-03 NOTE — ASSESSMENT & PLAN NOTE
2/23 FM:  Starting abx, get US.    2/24:  Increased induration and erythema noted.  Ultrasound pending.  Patient afebrile without leukocytosis.  Continue IV antibiotics.      2/25:  Ultrasound obtained yesterday reveals 12.6 x 6.1 x 3.0 cm complex fluid collection to patient's medial right thigh.  General surgery consulted.  Appreciate recs.  Patient afebrile without leukocytosis.  Continue current IV antibiotic treatment.    2/26:  Patient remains afebrile without leukocytosis.  Continue antibiotic treatment.  General surgery consulted.  Appreciate recs.    2/27:  S/P I&D per general surgery this morning.  Cultures pending.  Continue current IV abx treatment.    2/28:  Afebrile without leukocytosis.  Cultures pending.  Continue current IV antibiotic treatment.    3/1:  Patient afebrile without leukocytosis.  Cultures pending.  Continue current IV antibiotic treatment.  Pain controlled.    3/2:  POD #3 s/p I&D.  Pain controlled.  Patient remains afebrile without leukocytosis.  Cultures negative and pending.  Continue current plan.    3/3:  POD #4 s/p I&D right thigh. Pain controlled.  Patient afebrile without leukocytosis.  Wound cultures negative and pending.  Current IV antibiotics.  General surgery following.  Appreciate recs.  Continue current treatment plan.

## 2025-03-04 LAB
ALBUMIN SERPL BCP-MCNC: 2 G/DL (ref 3.5–5.2)
ALP SERPL-CCNC: 79 U/L (ref 55–135)
ALT SERPL W/O P-5'-P-CCNC: 18 U/L (ref 10–44)
ANION GAP SERPL CALC-SCNC: 6 MMOL/L (ref 8–16)
AST SERPL-CCNC: 58 U/L (ref 10–40)
BASOPHILS # BLD AUTO: 0.07 K/UL (ref 0–0.2)
BASOPHILS NFR BLD: 1.2 % (ref 0–1.9)
BILIRUB SERPL-MCNC: 3.2 MG/DL (ref 0.1–1)
BUN SERPL-MCNC: 28 MG/DL (ref 8–23)
CALCIUM SERPL-MCNC: 8.5 MG/DL (ref 8.7–10.5)
CHLORIDE SERPL-SCNC: 90 MMOL/L (ref 95–110)
CO2 SERPL-SCNC: 35 MMOL/L (ref 23–29)
CREAT SERPL-MCNC: 1.9 MG/DL (ref 0.5–1.4)
DIFFERENTIAL METHOD BLD: ABNORMAL
EOSINOPHIL # BLD AUTO: 0.1 K/UL (ref 0–0.5)
EOSINOPHIL NFR BLD: 2.3 % (ref 0–8)
ERYTHROCYTE [DISTWIDTH] IN BLOOD BY AUTOMATED COUNT: 19.1 % (ref 11.5–14.5)
EST. GFR  (NO RACE VARIABLE): 37.2 ML/MIN/1.73 M^2
GLUCOSE SERPL-MCNC: 94 MG/DL (ref 70–110)
HCT VFR BLD AUTO: 30.2 % (ref 40–54)
HGB BLD-MCNC: 9.9 G/DL (ref 14–18)
IMM GRANULOCYTES # BLD AUTO: 0.05 K/UL (ref 0–0.04)
IMM GRANULOCYTES NFR BLD AUTO: 0.9 % (ref 0–0.5)
LYMPHOCYTES # BLD AUTO: 1.2 K/UL (ref 1–4.8)
LYMPHOCYTES NFR BLD: 20.5 % (ref 18–48)
MAGNESIUM SERPL-MCNC: 1.7 MG/DL (ref 1.6–2.6)
MCH RBC QN AUTO: 30 PG (ref 27–31)
MCHC RBC AUTO-ENTMCNC: 32.8 G/DL (ref 32–36)
MCV RBC AUTO: 92 FL (ref 82–98)
MONOCYTES # BLD AUTO: 0.9 K/UL (ref 0.3–1)
MONOCYTES NFR BLD: 15.4 % (ref 4–15)
NEUTROPHILS # BLD AUTO: 3.4 K/UL (ref 1.8–7.7)
NEUTROPHILS NFR BLD: 59.7 % (ref 38–73)
NRBC BLD-RTO: 0 /100 WBC
PLATELET # BLD AUTO: 91 K/UL (ref 150–450)
PMV BLD AUTO: 11.7 FL (ref 9.2–12.9)
POTASSIUM SERPL-SCNC: 4.5 MMOL/L (ref 3.5–5.1)
PROT SERPL-MCNC: 6.6 G/DL (ref 6–8.4)
RBC # BLD AUTO: 3.3 M/UL (ref 4.6–6.2)
SODIUM SERPL-SCNC: 131 MMOL/L (ref 136–145)
WBC # BLD AUTO: 5.7 K/UL (ref 3.9–12.7)

## 2025-03-04 PROCEDURE — 25000242 PHARM REV CODE 250 ALT 637 W/ HCPCS: Performed by: STUDENT IN AN ORGANIZED HEALTH CARE EDUCATION/TRAINING PROGRAM

## 2025-03-04 PROCEDURE — 36415 COLL VENOUS BLD VENIPUNCTURE: CPT | Performed by: STUDENT IN AN ORGANIZED HEALTH CARE EDUCATION/TRAINING PROGRAM

## 2025-03-04 PROCEDURE — 94640 AIRWAY INHALATION TREATMENT: CPT

## 2025-03-04 PROCEDURE — 99900031 HC PATIENT EDUCATION (STAT)

## 2025-03-04 PROCEDURE — 85025 COMPLETE CBC W/AUTO DIFF WBC: CPT | Performed by: STUDENT IN AN ORGANIZED HEALTH CARE EDUCATION/TRAINING PROGRAM

## 2025-03-04 PROCEDURE — 80053 COMPREHEN METABOLIC PANEL: CPT | Performed by: STUDENT IN AN ORGANIZED HEALTH CARE EDUCATION/TRAINING PROGRAM

## 2025-03-04 PROCEDURE — 25000003 PHARM REV CODE 250: Performed by: STUDENT IN AN ORGANIZED HEALTH CARE EDUCATION/TRAINING PROGRAM

## 2025-03-04 PROCEDURE — 11000001 HC ACUTE MED/SURG PRIVATE ROOM

## 2025-03-04 PROCEDURE — 25000242 PHARM REV CODE 250 ALT 637 W/ HCPCS: Performed by: INTERNAL MEDICINE

## 2025-03-04 PROCEDURE — 25000003 PHARM REV CODE 250

## 2025-03-04 PROCEDURE — 94761 N-INVAS EAR/PLS OXIMETRY MLT: CPT

## 2025-03-04 PROCEDURE — 83735 ASSAY OF MAGNESIUM: CPT | Performed by: STUDENT IN AN ORGANIZED HEALTH CARE EDUCATION/TRAINING PROGRAM

## 2025-03-04 PROCEDURE — 99900035 HC TECH TIME PER 15 MIN (STAT)

## 2025-03-04 PROCEDURE — 27000207 HC ISOLATION

## 2025-03-04 RX ORDER — LEVALBUTEROL 1.25 MG/.5ML
1.25 SOLUTION, CONCENTRATE RESPIRATORY (INHALATION)
Status: DISCONTINUED | OUTPATIENT
Start: 2025-03-04 | End: 2025-03-05 | Stop reason: HOSPADM

## 2025-03-04 RX ADMIN — METOPROLOL TARTRATE 25 MG: 25 TABLET, FILM COATED ORAL at 09:03

## 2025-03-04 RX ADMIN — LEVALBUTEROL 1.25 MG: 1.25 SOLUTION, CONCENTRATE RESPIRATORY (INHALATION) at 06:03

## 2025-03-04 RX ADMIN — MIDODRINE HYDROCHLORIDE 5 MG: 5 TABLET ORAL at 06:03

## 2025-03-04 RX ADMIN — LEVALBUTEROL 1.25 MG: 1.25 SOLUTION, CONCENTRATE RESPIRATORY (INHALATION) at 02:03

## 2025-03-04 RX ADMIN — Medication 100 MG: at 09:03

## 2025-03-04 RX ADMIN — MAGNESIUM 64 MG (MAGNESIUM CHLORIDE) TABLET,DELAYED RELEASE 128 MG: at 09:03

## 2025-03-04 RX ADMIN — POTASSIUM BICARBONATE 25 MEQ: 978 TABLET, EFFERVESCENT ORAL at 09:03

## 2025-03-04 RX ADMIN — FAMOTIDINE 20 MG: 20 TABLET, FILM COATED ORAL at 09:03

## 2025-03-04 RX ADMIN — LEVALBUTEROL 1.25 MG: 1.25 SOLUTION, CONCENTRATE RESPIRATORY (INHALATION) at 07:03

## 2025-03-04 RX ADMIN — MIDODRINE HYDROCHLORIDE 5 MG: 5 TABLET ORAL at 02:03

## 2025-03-04 RX ADMIN — MIDODRINE HYDROCHLORIDE 5 MG: 5 TABLET ORAL at 09:03

## 2025-03-04 RX ADMIN — TAMSULOSIN HYDROCHLORIDE 0.4 MG: 0.4 CAPSULE ORAL at 09:03

## 2025-03-04 RX ADMIN — LACTULOSE 20 G: 20 SOLUTION ORAL at 09:03

## 2025-03-04 NOTE — ASSESSMENT & PLAN NOTE
Patient has long standing persistent (>12 months) atrial fibrillation. Patient is currently in atrial fibrillation. [id number] Score: 1. The patients heart rate in the last 24 hours is as follows:  Pulse  Min: 74  Max: 116     Antiarrhythmics  metoprolol tartrate (LOPRESSOR) tablet 25 mg, 2 times daily, Oral    Anticoagulants       Plan  - Replete lytes with a goal of K>4, Mg >2  - Patient is anticoagulated, see medications listed above.  - Patient's afib is currently controlled    Auto anticoagulated with CLD.

## 2025-03-04 NOTE — ASSESSMENT & PLAN NOTE
Patient has Abnormal Magnesium: hypomagnesemia. Will continue to monitor electrolytes closely. Will replace the affected electrolytes and repeat labs to be done after interventions completed. The patient's magnesium results have been reviewed and are listed below.  Recent Labs   Lab 03/04/25  0612   MG 1.7

## 2025-03-04 NOTE — ASSESSMENT & PLAN NOTE
Nutrition consulted. Most recent weight and BMI monitored-     Measurements:  Wt Readings from Last 1 Encounters:   03/03/25 131.5 kg (289 lb 14.5 oz)   Body mass index is 38.25 kg/m².    Patient has been screened and assessed by RD.    Malnutrition Type:  Context:    Level:      Malnutrition Characteristic Summary:       Interventions/Recommendations (treatment strategy):  1. Rec'd Renal Cardiac diet. 2. Rec'd ONS: Nepro or Novasource Renal TID to provide additional nutrition. 3. Rec'd Beneprotein TID.  4. Rec'd Lalito BID to promote wound healing and to provide additional nutrition. 5. RD to follow and mkae rec's accordingly.

## 2025-03-04 NOTE — SUBJECTIVE & OBJECTIVE
Past Medical History:   Diagnosis Date    Atrial fibrillation     Bulging disc     Cirrhosis     Colon polyp 12/29/2017    Rpt 5 yrs    EV (esophageal varices)     Hypertension 03/30/2023    Renal disorder     Skin cancer 03/2017    Thrombocytopenia        Past Surgical History:   Procedure Laterality Date    CATHETERIZATION OF BOTH LEFT AND RIGHT HEART N/A 2/8/2023    Procedure: CATHETERIZATION, HEART, BOTH LEFT AND RIGHT;  Surgeon: Flo Malone MD;  Location: [facility] CATH LAB;  Service: Cardiology;  Laterality: N/A;  low bleeding risk 1.0%    CHOLECYSTECTOMY      COLONOSCOPY      COLONOSCOPY N/A 12/29/2017    ta rpt 2022    CORONARY ANGIOGRAPHY N/A 2/8/2023    Procedure: ANGIOGRAM, CORONARY ARTERY;  Surgeon: Flo Malone MD;  Location: [facility] CATH LAB;  Service: Cardiology;  Laterality: N/A;    HERNIA REPAIR      INCISION AND DRAINAGE Right 2/27/2025    Procedure: Incision and Drainage, thigh;  Surgeon: Kayla Foster MD;  Location: [facility];  Service: General;  Laterality: Right;  Plan to go first if pt has cardiac clearance - SB    SKIN BIOPSY  03/2017    TONSILLECTOMY      UPPER GASTROINTESTINAL ENDOSCOPY  2011    EV    UPPER GASTROINTESTINAL ENDOSCOPY  2016    VASECTOMY         Review of patient's allergies indicates:  No Known Allergies    No current facility-administered medications on file prior to encounter.     Current Outpatient Medications on File Prior to Encounter   Medication Sig    famotidine (PEPCID) 20 MG tablet TAKE 1 TABLET BY MOUTH EVERY OTHER DAY    furosemide (LASIX) 80 MG tablet Take 80 mg by mouth every Tuesday, Thursday, Saturday, Sunday.    lactulose (CHRONULAC) 20 gram/30 mL Soln Take 45 mLs (30 g total) by mouth 3 (three) times daily. TITRATE TO 3-4 BOWEL MOVEMENTS DAILY.    metoprolol tartrate (LOPRESSOR) 25 MG tablet TAKE 0.5 TABLETS BY MOUTH 2 TIMES DAILY.    midodrine (PROAMATINE) 5 MG Tab Take 3 tablets (15 mg total) by mouth every 8 (eight) hours.    potassium chloride  (K-TAB) 20 mEq SMARTSI Tablet(s) By Mouth    tamsulosin (FLOMAX) 0.4 mg Cap Take by mouth.    tuberculin,purif.prot.deriv. (TUBERSOL IDRM) Inject 0.1 mLs into the skin.     Family History       Problem Relation (Age of Onset)    Cancer Maternal Uncle    Heart disease Maternal Uncle    Hyperlipidemia Brother    Ovarian cancer Sister          Tobacco Use    Smoking status: Never     Passive exposure: Never    Smokeless tobacco: Never   Substance and Sexual Activity    Alcohol use: Not Currently     Alcohol/week: 0.0 standard drinks of alcohol    Drug use: No    Sexual activity: Not Currently     Review of Systems   Constitutional:  Positive for fatigue. Negative for activity change, appetite change, chills and fever.   HENT:  Negative for ear pain, mouth sores, nosebleeds and sore throat.    Eyes:  Negative for visual disturbance.   Respiratory:  Positive for cough and wheezing. Negative for chest tightness, shortness of breath and stridor.    Cardiovascular:  Positive for leg swelling. Negative for chest pain and palpitations.   Gastrointestinal:  Negative for abdominal distention, abdominal pain, anal bleeding, blood in stool, constipation, diarrhea, nausea, rectal pain and vomiting.   Endocrine: Negative for polyphagia.   Genitourinary:  Positive for difficulty urinating. Negative for dysuria, flank pain and frequency.   Musculoskeletal:  Positive for gait problem. Negative for myalgias.   Skin:  Positive for color change and rash.   Neurological:  Positive for weakness. Negative for dizziness, tremors, seizures, syncope, facial asymmetry, speech difficulty and headaches.   Hematological:  Negative for adenopathy.   Psychiatric/Behavioral:  Positive for confusion. Negative for agitation and hallucinations. The patient is not nervous/anxious.      Objective:     Vital Signs (Most Recent):  Temp: 97.5 °F (36.4 °C) (25 0730)  Pulse: 89 (25 0941)  Resp: 18 (25 0722)  BP: (!) 102/55 (25  0941)  SpO2: 100 % (03/04/25 0730) Vital Signs (24h Range):  Temp:  [97.5 °F (36.4 °C)-97.8 °F (36.6 °C)] 97.5 °F (36.4 °C)  Pulse:  [] 89  Resp:  [18-20] 18  SpO2:  [93 %-100 %] 100 %  BP: ()/(46-59) 102/55     Weight: 131.5 kg (289 lb 14.5 oz)  Body mass index is 38.25 kg/m².     Physical Exam  Vitals and nursing note reviewed.   Constitutional:       General: He is not in acute distress.     Appearance: He is obese. He is not ill-appearing, toxic-appearing or diaphoretic.   HENT:      Head: Normocephalic and atraumatic.      Right Ear: External ear normal.      Left Ear: External ear normal.      Nose: Nose normal. No rhinorrhea.      Mouth/Throat:      Mouth: Mucous membranes are moist.      Pharynx: No oropharyngeal exudate or posterior oropharyngeal erythema.   Eyes:      General: Scleral icterus present.      Extraocular Movements: Extraocular movements intact.   Neck:      Vascular: No carotid bruit.   Cardiovascular:      Rate and Rhythm: Normal rate and regular rhythm.      Heart sounds: Normal heart sounds. No murmur heard.     No friction rub. No gallop.   Pulmonary:      Effort: Pulmonary effort is normal. No respiratory distress.      Breath sounds: No stridor. Wheezing and rhonchi present. No rales.   Chest:      Chest wall: No tenderness.   Abdominal:      General: There is no distension.      Palpations: Abdomen is soft. There is no mass.      Tenderness: There is no abdominal tenderness. There is no right CVA tenderness, left CVA tenderness, guarding or rebound.      Hernia: No hernia is present.   Musculoskeletal:         General: No swelling, tenderness or deformity.      Cervical back: No rigidity.      Right lower leg: Pitting Edema present.      Left lower leg: Pitting Edema present.      Comments: Right thigh penrose in place with serous drainage. Counter incisions clean and dry without exudate.    Lymphadenopathy:      Cervical: No cervical adenopathy.   Skin:     General: Skin  is warm and dry.      Capillary Refill: Capillary refill takes less than 2 seconds.      Coloration: Skin is jaundiced.      Findings: Erythema and rash present.             Comments: 12 x 10 cm area of erythema and fluctuance to medial right thigh s/p I&D this morning.  ACE wrap in place without drainage.   Neurological:      Mental Status: He is alert. Mental status is at baseline.      Cranial Nerves: No cranial nerve deficit.      Sensory: No sensory deficit.      Motor: Weakness present.      Coordination: Coordination normal.   Psychiatric:         Mood and Affect: Mood normal.         Behavior: Behavior normal.         Thought Content: Thought content normal.         Judgment: Judgment normal.                Significant Labs: All pertinent labs within the past 24 hours have been reviewed.  Recent Lab Results         03/04/25  0612        Albumin 2.0       ALP 79       ALT 18       Anion Gap 6       AST 58       Baso # 0.07       Basophil % 1.2       BILIRUBIN TOTAL 3.2  Comment: For infants and newborns, interpretation of results should be based  on gestational age, weight and in agreement with clinical  observations.    Premature Infant recommended reference ranges:  Up to 24 hours.............<8.0 mg/dL  Up to 48 hours............<12.0 mg/dL  3-5 days..................<15.0 mg/dL  6-29 days.................<15.0 mg/dL         BUN 28       Calcium 8.5       Chloride 90       CO2 35       Creatinine 1.9       Differential Method Automated       eGFR 37.2       Eos # 0.1       Eos % 2.3       Glucose 94       Gran # (ANC) 3.4       Gran % 59.7       Hematocrit 30.2       Hemoglobin 9.9       Immature Grans (Abs) 0.05  Comment: Mild elevation in immature granulocytes is non specific and   can be seen in a variety of conditions including stress response,   acute inflammation, trauma and pregnancy. Correlation with other   laboratory and clinical findings is essential.         Immature Granulocytes 0.9        Lymph # 1.2       Lymph % 20.5       Magnesium  1.7       MCH 30.0       MCHC 32.8       MCV 92       Mono # 0.9       Mono % 15.4       MPV 11.7       nRBC 0       Platelet Count 91       Potassium 4.5       PROTEIN TOTAL 6.6       RBC 3.30       RDW 19.1       Sodium 131       WBC 5.70               Significant Imaging: I have reviewed all pertinent imaging results/findings within the past 24 hours.

## 2025-03-04 NOTE — ASSESSMENT & PLAN NOTE
Patient's most recent potassium results are listed below.   Recent Labs     03/02/25  0545 03/03/25  0551 03/04/25  0612   K 2.6* 3.3* 4.5       Plan  - Replete potassium per protocol  - Monitor potassium Daily  - Patient's hypokalemia is stable  - Nephrology following.  Appreciate recs.    3/1:  Continued hypokalemia noted.  We will adjust p.o. potassium regimen.  Additional K riders given.  Continue monitoring with daily labs.    3/2:  A.m. labs pending.  We will review once resulted.  Plan to continue scheduled p.o. replacement with additional rider's if needed.    3/3:  Continued hypokalemia but overall improving.  Continue p.o. and IV replacement.  Monitor with daily labs.    3/4:  Stable.  Continue monitoring with daily labs.

## 2025-03-04 NOTE — ASSESSMENT & PLAN NOTE
2/23 FM:  Starting abx, get US.    2/24:  Increased induration and erythema noted.  Ultrasound pending.  Patient afebrile without leukocytosis.  Continue IV antibiotics.      2/25:  Ultrasound obtained yesterday reveals 12.6 x 6.1 x 3.0 cm complex fluid collection to patient's medial right thigh.  General surgery consulted.  Appreciate recs.  Patient afebrile without leukocytosis.  Continue current IV antibiotic treatment.    2/26:  Patient remains afebrile without leukocytosis.  Continue antibiotic treatment.  General surgery consulted.  Appreciate recs.    2/27:  S/P I&D per general surgery this morning.  Cultures pending.  Continue current IV abx treatment.    2/28:  Afebrile without leukocytosis.  Cultures pending.  Continue current IV antibiotic treatment.    3/1:  Patient afebrile without leukocytosis.  Cultures pending.  Continue current IV antibiotic treatment.  Pain controlled.    3/2:  POD #3 s/p I&D.  Pain controlled.  Patient remains afebrile without leukocytosis.  Cultures negative and pending.  Continue current plan.    3/3:  POD #4 s/p I&D right thigh. Pain controlled.  Patient afebrile without leukocytosis.  Wound cultures negative and pending.  Current IV antibiotics.  General surgery following.  Appreciate recs.  Continue current treatment plan.    3/4:  POD #5 s/p I&D right thigh.  Pain controlled.  Scant drainage noted from Penrose drain.  Patient afebrile without leukocytosis.  Wound cultures negative and pending.  Continue current IV antibiotic treatment.  General surgery following.  Appreciate recs.

## 2025-03-04 NOTE — PROGRESS NOTES
[facility] Medicine  Progress Note    Patient Name: Juan Carlos Yoo Sr.  MRN: [id number]  Patient Class: IP- Inpatient   Admission Date: 2/21/2025  Length of Stay: 10 days  Attending Physician: Joao Villegas III, MD  Primary Care Provider: Joao Villegas III, MD        Subjective     Principal Problem:Rhinovirus infection        HPI:  ED HPI:  Juan Carlos Yoo Sr. is a 69 y.o. male with PMHX of alcoholic cirrhosis, HCC, thrombocytopenia, Afib (not on anticoagulation due to thrombocytopenia), ESRD on HD M/W/F who presents to the emergency department C/O SOB.     Patient was recently  admitted to [facility] due to severe anasarca JAE, evaluated for hepatorenal syndrome and had 40 day hospitalization.  Patient released from rehab about a week ago.  Has endorse increasing shortness of breath for the past 3 days.  States at night it is worse when trying to sleep.  Patient lays on his side and uses 3 pillows to prop his head up.  Patient went to urgent care today because he felt like tonight was going to be another bad night and they sent him to ER.  He denies fever.  States swelling is about baseline.  Had typical dialysis session today.  He reports they were trying to take off 3 L but is unsure of how much volume was removed.  He makes urine but states it is difficult to urinate due to anasarca.  He states compliance to low-sodium diet and fluid restriction since recent discharge.  Has not had alcohol in over 7 months.    IM HPI:  Patient's chart reviewed and above history noted.  Patient's been admitted for acute on chronic dyspnea and was diagnosed with rhinovirus.  Patient had been having increasing dyspnea weakness coughing with clear sputum production over the last 36 hours and ultimately presented with worsening shortness of breath.  He has been placed on oxygen nasal cannula states he is feeling much better.  Patient has a mild cough and wheezing on exam.  Patient has a complex  medical history that is has been reviewed he seems slightly hypervolemic we will notify his nephrologist and he will likely need his routine hemodialysis if he ends up staying through Monday.  Patient is afebrile labs noted potassium low we will replete and maybe a good candidate for Aldactone.    Overview/Hospital Course:  2/23 FM:  Patient complaining of a area of redness and pain on the right thigh above the knee.  This started yesterday.  Patient's exam consistent with a area of focal cellulitis and induration.  Ordering ultrasound and starting antibiotics.  Patient's respiratory complaints are much improved and getting back to his baseline.  Patient's nephrologist aware he is in house and may need hemodialysis in a.m..    2/24 DL:  Patient doing well this morning.  He is sitting up in bed in his awake, alert, oriented.  Patient reports respiratory status improved.  Patient now off of supplemental O2.  Patient reports continued tenderness to area of redness to right inner thigh.  Redness and induration have spread past previously marked borders.  Patient is scheduled for ultrasound today.  Continue IV antibiotics.  Patient is scheduled for hemodialysis today.  Nephrology following.    2/25 DL:  Patient doing well this morning.  He is lying in bed in his awake, alert, oriented.  Respiratory status with continued improvement.  Labs and vital signs stable.  Ultrasound right thigh obtained yesterday reveals 12.6 x 6.1 x 3.0 cm complex fluid collection to the medial right thigh.  Venous ultrasound negative for DVT.  General surgery consulted.  Continue current IV antibiotics.  Renal function stable.  Nephrology following.    2/26 DL:  Patient doing well this morning.  He is lying in bed in his awake, alert, oriented.  Respiratory status at baseline.  Vital signs stable.  Patient afebrile without leukocytosis.  Continue current antibiotic treatment for abscess to right thigh.  General surgery consulted.  Appreciate  recs.  Hypokalemia and hypomagnesemia noted this morning.  We will replenish.  Nephrology following.  Appreciate recs.    2/27 DL:  Patient lying in bed resting comfortably.  He has just returned to room from OR for I and D of right inner thigh.  Patient tolerated procedure well.  Patient is afebrile without leukocytosis this morning.  We will continue current IV antibiotic treatment for right thigh abscess pending results of culture obtained per general surgery this morning.  Patient's potassium 2.8 this morning.  Continue p.o. replacement.  We will also give IV replacement today.  Magnesium pending.  Labs and vital signs otherwise stable.  Renal function stable.  Nephrology following.    2/28 DL:  Patient is sitting up on side of bed eating breakfast.  He endorses pain to right inner thigh I&D site that is controlled with current regimen.  Wound cultures with no growth to date x1 day.  Continue current antibiotic treatment.  Continued hypokalemia noted.  Continue p.o. replacement t.i.d.  We we will also give IV replacement today.  Hypomagnesemia also noted.  We will replenish today.  Renal function stable.  Nephrology following.  Labs and vital signs otherwise stable.  Patient with continued improved respiratory status.  Continue discharge planning.    3/1 DL:  Patient doing well this morning.  He is lying in bed is awake, alert, oriented.  Patient reports pain to right thigh controlled with current regimen.  Scant drainage from I&D site.  Patient is afebrile without leukocytosis.  Anaerobic culture pending.  Aerobic culture with no growth to date.  Continue current IV antibiotics.  Respiratory status stable.  Patient with continued hypokalemia.  We will switch p.o. potassium to effervescent potassium.  Additional K riders given.  Continue monitoring with daily labs.  Continue discharge planning.      3/2 DL:  Patient lying bed awake oriented.  POD #3 I&D right thigh.  He reports pain to right thigh controlled.   Patient states he has been able to ambulate in the room without difficulty.  Scant serous drainage noted from Penrose drain.  Wound cultures with no growth and pending.  Continue current IV antibiotics.  Patient afebrile without leukocytosis.  Remaining labs pending.  We will follow up once completed and continue to replace K hypokalemia continues.  Continue current treatment plan.  Continue discharge planning.    3/3 DL:  Patient lying in bed and is awake, alert, oriented.  Pod 4. Right thigh I&D.  Pain control.  Wound cultures pending and was IV antibiotics.  Patient afebrile without leukocytosis to 3.3.  Mag 1.3.  We will replenish.  Continue current treatment plan.  Continue discharge planning.  Patient will need follow up with General surgery in outpatient setting.    3/4 DL:  Patient doing well this morning.  He is lying in bed and is awake, alert oriented.  POD#5 right thigh I&D.  Pain controlled.  Scant drainage from Penrose drain noted.  Wound cultures with no growth and pending.  General surgery following.  Appreciate recs.  Patient afebrile without leukocytosis.  Continue current IV antibiotics.  Potassium and magnesium within normal limits today.  Continue monitoring with daily labs.  Continue discharge planning.    Past Medical History:   Diagnosis Date    Atrial fibrillation     Bulging disc     Cirrhosis     Colon polyp 12/29/2017    Rpt 5 yrs    EV (esophageal varices)     Hypertension 03/30/2023    Renal disorder     Skin cancer 03/2017    Thrombocytopenia        Past Surgical History:   Procedure Laterality Date    CATHETERIZATION OF BOTH LEFT AND RIGHT HEART N/A 2/8/2023    Procedure: CATHETERIZATION, HEART, BOTH LEFT AND RIGHT;  Surgeon: Flo Malone MD;  Location: [facility] CATH LAB;  Service: Cardiology;  Laterality: N/A;  low bleeding risk 1.0%    CHOLECYSTECTOMY      COLONOSCOPY      COLONOSCOPY N/A 12/29/2017    ta rpt 2022    CORONARY ANGIOGRAPHY N/A 2/8/2023    Procedure: ANGIOGRAM,  CORONARY ARTERY;  Surgeon: Flo Malone MD;  Location: [facility] CATH LAB;  Service: Cardiology;  Laterality: N/A;    HERNIA REPAIR      INCISION AND DRAINAGE Right 2025    Procedure: Incision and Drainage, thigh;  Surgeon: Kayla Foster MD;  Location: [facility] OR;  Service: General;  Laterality: Right;  Plan to go first if pt has cardiac clearance - SB    SKIN BIOPSY  2017    TONSILLECTOMY      UPPER GASTROINTESTINAL ENDOSCOPY      EV    UPPER GASTROINTESTINAL ENDOSCOPY      VASECTOMY         Review of patient's allergies indicates:  No Known Allergies    No current facility-administered medications on file prior to encounter.     Current Outpatient Medications on File Prior to Encounter   Medication Sig    famotidine (PEPCID) 20 MG tablet TAKE 1 TABLET BY MOUTH EVERY OTHER DAY    furosemide (LASIX) 80 MG tablet Take 80 mg by mouth every Tuesday, Thursday, Saturday, .    lactulose (CHRONULAC) 20 gram/30 mL Soln Take 45 mLs (30 g total) by mouth 3 (three) times daily. TITRATE TO 3-4 BOWEL MOVEMENTS DAILY.    metoprolol tartrate (LOPRESSOR) 25 MG tablet TAKE 0.5 TABLETS BY MOUTH 2 TIMES DAILY.    midodrine (PROAMATINE) 5 MG Tab Take 3 tablets (15 mg total) by mouth every 8 (eight) hours.    potassium chloride (K-TAB) 20 mEq SMARTSI Tablet(s) By Mouth    tamsulosin (FLOMAX) 0.4 mg Cap Take by mouth.    tuberculin,purif.prot.deriv. (TUBERSOL IDRM) Inject 0.1 mLs into the skin.     Family History       Problem Relation (Age of Onset)    Cancer Maternal Uncle    Heart disease Maternal Uncle    Hyperlipidemia Brother    Ovarian cancer Sister          Tobacco Use    Smoking status: Never     Passive exposure: Never    Smokeless tobacco: Never   Substance and Sexual Activity    Alcohol use: Not Currently     Alcohol/week: 0.0 standard drinks of alcohol    Drug use: No    Sexual activity: Not Currently     Review of Systems   Constitutional:  Positive for fatigue. Negative for activity change,  appetite change, chills and fever.   HENT:  Negative for ear pain, mouth sores, nosebleeds and sore throat.    Eyes:  Negative for visual disturbance.   Respiratory:  Positive for cough and wheezing. Negative for chest tightness, shortness of breath and stridor.    Cardiovascular:  Positive for leg swelling. Negative for chest pain and palpitations.   Gastrointestinal:  Negative for abdominal distention, abdominal pain, anal bleeding, blood in stool, constipation, diarrhea, nausea, rectal pain and vomiting.   Endocrine: Negative for polyphagia.   Genitourinary:  Positive for difficulty urinating. Negative for dysuria, flank pain and frequency.   Musculoskeletal:  Positive for gait problem. Negative for myalgias.   Skin:  Positive for color change and rash.   Neurological:  Positive for weakness. Negative for dizziness, tremors, seizures, syncope, facial asymmetry, speech difficulty and headaches.   Hematological:  Negative for adenopathy.   Psychiatric/Behavioral:  Positive for confusion. Negative for agitation and hallucinations. The patient is not nervous/anxious.      Objective:     Vital Signs (Most Recent):  Temp: 97.5 °F (36.4 °C) (03/04/25 0730)  Pulse: 89 (03/04/25 0941)  Resp: 18 (03/04/25 0722)  BP: (!) 102/55 (03/04/25 0941)  SpO2: 100 % (03/04/25 0730) Vital Signs (24h Range):  Temp:  [97.5 °F (36.4 °C)-97.8 °F (36.6 °C)] 97.5 °F (36.4 °C)  Pulse:  [] 89  Resp:  [18-20] 18  SpO2:  [93 %-100 %] 100 %  BP: ()/(46-59) 102/55     Weight: 131.5 kg (289 lb 14.5 oz)  Body mass index is 38.25 kg/m².     Physical Exam  Vitals and nursing note reviewed.   Constitutional:       General: He is not in acute distress.     Appearance: He is obese. He is not ill-appearing, toxic-appearing or diaphoretic.   HENT:      Head: Normocephalic and atraumatic.      Right Ear: External ear normal.      Left Ear: External ear normal.      Nose: Nose normal. No rhinorrhea.      Mouth/Throat:      Mouth: Mucous  membranes are moist.      Pharynx: No oropharyngeal exudate or posterior oropharyngeal erythema.   Eyes:      General: Scleral icterus present.      Extraocular Movements: Extraocular movements intact.   Neck:      Vascular: No carotid bruit.   Cardiovascular:      Rate and Rhythm: Normal rate and regular rhythm.      Heart sounds: Normal heart sounds. No murmur heard.     No friction rub. No gallop.   Pulmonary:      Effort: Pulmonary effort is normal. No respiratory distress.      Breath sounds: No stridor. Wheezing and rhonchi present. No rales.   Chest:      Chest wall: No tenderness.   Abdominal:      General: There is no distension.      Palpations: Abdomen is soft. There is no mass.      Tenderness: There is no abdominal tenderness. There is no right CVA tenderness, left CVA tenderness, guarding or rebound.      Hernia: No hernia is present.   Musculoskeletal:         General: No swelling, tenderness or deformity.      Cervical back: No rigidity.      Right lower leg: Pitting Edema present.      Left lower leg: Pitting Edema present.      Comments: Right thigh penrose in place with serous drainage. Counter incisions clean and dry without exudate.    Lymphadenopathy:      Cervical: No cervical adenopathy.   Skin:     General: Skin is warm and dry.      Capillary Refill: Capillary refill takes less than 2 seconds.      Coloration: Skin is jaundiced.      Findings: Erythema and rash present.             Comments: 12 x 10 cm area of erythema and fluctuance to medial right thigh s/p I&D this morning.  ACE wrap in place without drainage.   Neurological:      Mental Status: He is alert. Mental status is at baseline.      Cranial Nerves: No cranial nerve deficit.      Sensory: No sensory deficit.      Motor: Weakness present.      Coordination: Coordination normal.   Psychiatric:         Mood and Affect: Mood normal.         Behavior: Behavior normal.         Thought Content: Thought content normal.         Judgment:  Judgment normal.                Significant Labs: All pertinent labs within the past 24 hours have been reviewed.  Recent Lab Results         03/04/25  0612        Albumin 2.0       ALP 79       ALT 18       Anion Gap 6       AST 58       Baso # 0.07       Basophil % 1.2       BILIRUBIN TOTAL 3.2  Comment: For infants and newborns, interpretation of results should be based  on gestational age, weight and in agreement with clinical  observations.    Premature Infant recommended reference ranges:  Up to 24 hours.............<8.0 mg/dL  Up to 48 hours............<12.0 mg/dL  3-5 days..................<15.0 mg/dL  6-29 days.................<15.0 mg/dL         BUN 28       Calcium 8.5       Chloride 90       CO2 35       Creatinine 1.9       Differential Method Automated       eGFR 37.2       Eos # 0.1       Eos % 2.3       Glucose 94       Gran # (ANC) 3.4       Gran % 59.7       Hematocrit 30.2       Hemoglobin 9.9       Immature Grans (Abs) 0.05  Comment: Mild elevation in immature granulocytes is non specific and   can be seen in a variety of conditions including stress response,   acute inflammation, trauma and pregnancy. Correlation with other   laboratory and clinical findings is essential.         Immature Granulocytes 0.9       Lymph # 1.2       Lymph % 20.5       Magnesium  1.7       MCH 30.0       MCHC 32.8       MCV 92       Mono # 0.9       Mono % 15.4       MPV 11.7       nRBC 0       Platelet Count 91       Potassium 4.5       PROTEIN TOTAL 6.6       RBC 3.30       RDW 19.1       Sodium 131       WBC 5.70               Significant Imaging: I have reviewed all pertinent imaging results/findings within the past 24 hours.    Assessment and Plan     * Rhinovirus infection  Conservative care, + now with O2 needs.    2/24:  Continue conservative care.  Symptoms improving.  Patient with stable O2 sats on room air.    2/27:  Stable.  Continue conservative care.    3/2:  Resolved      Hypomagnesemia  Patient has  Abnormal Magnesium: hypomagnesemia. Will continue to monitor electrolytes closely. Will replace the affected electrolytes and repeat labs to be done after interventions completed. The patient's magnesium results have been reviewed and are listed below.  Recent Labs   Lab 03/04/25  0612   MG 1.7          Cellulitis of right lower extremity  2/23 FM:  Starting abx, get US.    2/24:  Increased induration and erythema noted.  Ultrasound pending.  Patient afebrile without leukocytosis.  Continue IV antibiotics.      2/25:  Ultrasound obtained yesterday reveals 12.6 x 6.1 x 3.0 cm complex fluid collection to patient's medial right thigh.  General surgery consulted.  Appreciate recs.  Patient afebrile without leukocytosis.  Continue current IV antibiotic treatment.    2/26:  Patient remains afebrile without leukocytosis.  Continue antibiotic treatment.  General surgery consulted.  Appreciate recs.    2/27:  S/P I&D per general surgery this morning.  Cultures pending.  Continue current IV abx treatment.    2/28:  Afebrile without leukocytosis.  Cultures pending.  Continue current IV antibiotic treatment.    3/1:  Patient afebrile without leukocytosis.  Cultures pending.  Continue current IV antibiotic treatment.  Pain controlled.    3/2:  POD #3 s/p I&D.  Pain controlled.  Patient remains afebrile without leukocytosis.  Cultures negative and pending.  Continue current plan.    3/3:  POD #4 s/p I&D right thigh. Pain controlled.  Patient afebrile without leukocytosis.  Wound cultures negative and pending.  Current IV antibiotics.  General surgery following.  Appreciate recs.  Continue current treatment plan.    3/4:  POD #5 s/p I&D right thigh.  Pain controlled.  Scant drainage noted from Penrose drain.  Patient afebrile without leukocytosis.  Wound cultures negative and pending.  Continue current IV antibiotic treatment.  General surgery following.  Appreciate recs.        Hypokalemia  Patient's most recent potassium results  are listed below.   Recent Labs     03/02/25  0545 03/03/25  0551 03/04/25  0612   K 2.6* 3.3* 4.5       Plan  - Replete potassium per protocol  - Monitor potassium Daily  - Patient's hypokalemia is stable  - Nephrology following.  Appreciate recs.    3/1:  Continued hypokalemia noted.  We will adjust p.o. potassium regimen.  Additional K riders given.  Continue monitoring with daily labs.    3/2:  A.m. labs pending.  We will review once resulted.  Plan to continue scheduled p.o. replacement with additional rider's if needed.    3/3:  Continued hypokalemia but overall improving.  Continue p.o. and IV replacement.  Monitor with daily labs.    3/4:  Stable.  Continue monitoring with daily labs.    Morbid obesity  Body mass index is 38.25 kg/m². Morbid obesity complicates all aspects of disease management from diagnostic modalities to treatment. Weight loss encouraged and health benefits explained to patient.         Moderate protein-calorie malnutrition  Nutrition consulted. Most recent weight and BMI monitored-     Measurements:  Wt Readings from Last 1 Encounters:   03/03/25 131.5 kg (289 lb 14.5 oz)   Body mass index is 38.25 kg/m².    Patient has been screened and assessed by RD.    Malnutrition Type:  Context:    Level:      Malnutrition Characteristic Summary:       Interventions/Recommendations (treatment strategy):  1. Rec'd Renal Cardiac diet. 2. Rec'd ONS: Nepro or Novasource Renal TID to provide additional nutrition. 3. Rec'd Beneprotein TID.  4. Rec'd Lalito BID to promote wound healing and to provide additional nutrition. 5. RD to follow and mkae rec's accordingly.      Hypotension  Blood pressure stable.  Continue monitoring.      Hepatic encephalopathy  Continue lactulose.      CKD (chronic kidney disease) stage 4, GFR 15-29 ml/min  Creatine stable for now. BMP reviewed- noted Estimated Creatinine Clearance: 50.7 mL/min (A) (based on SCr of 1.9 mg/dL (H)). according to latest data. Based on current GFR,  CKD stage is stage 5 - GFR < 15.  Monitor UOP and serial BMP and adjust therapy as needed. Renally dose meds. Avoid nephrotoxic medications and procedures.    Per renal, currently on MWF HD, ? Still needs this?    Suspected Hepatorenal syndrome  Slightly hypervolemic today, much improved from past care.      Longstanding persistent atrial fibrillation  Patient has long standing persistent (>12 months) atrial fibrillation. Patient is currently in atrial fibrillation. [id number] Score: 1. The patients heart rate in the last 24 hours is as follows:  Pulse  Min: 74  Max: 116     Antiarrhythmics  metoprolol tartrate (LOPRESSOR) tablet 25 mg, 2 times daily, Oral    Anticoagulants       Plan  - Replete lytes with a goal of K>4, Mg >2  - Patient is anticoagulated, see medications listed above.  - Patient's afib is currently controlled    Auto anticoagulated with CLD.        Coagulopathy  CLD      Cirrhosis  Patient with known Cirrhosis with Child's class C. Co-morbidities are present and inclusive of portal hypertension, hepatic encephalopathy, malnutrition, anemia/pancytopenia, and anticoagulation.  MELD-Na score calculated; MELD 3.0: 28 at 2/23/2025  5:15 AM  MELD-Na: 26 at 2/23/2025  5:15 AM  Calculated from:  Serum Creatinine: 1.8 mg/dL at 2/23/2025  5:15 AM  Serum Sodium: 133 mmol/L at 2/23/2025  5:15 AM  Total Bilirubin: 3.9 mg/dL at 2/23/2025  5:15 AM  Serum Albumin: 2.1 g/dL at 2/23/2025  5:15 AM  INR(ratio): 1.9 at 2/21/2025  6:42 PM  Age at listing (hypothetical): 69 years  Sex: Male at 2/23/2025  5:15 AM      Continue chronic meds. Etiology likely ETOH. Will avoid any hepatotoxic meds, and monitor CBC/CMP/INR for synthetic function.       VTE Risk Mitigation (From admission, onward)           Ordered     Place sequential compression device  Until discontinued         02/22/25 1002     IP VTE HIGH RISK PATIENT  Once         02/21/25 2204     Place ANDREW hose  Until discontinued         02/21/25 2204                     Discharge Planning   AUTUMN:      Code Status: Full Code   Medical Readiness for Discharge Date:   Discharge Plan A: Home   Discharge Delays: None known at this time            Please place Justification for DME        Pilar Gates NP  Department of Hospital Medicine   [facility]

## 2025-03-04 NOTE — PLAN OF CARE
03/04/25 1723   Medicare Message   Important Message from Medicare regarding Discharge Appeal Rights Given to patient/caregiver;Explained to patient/caregiver;Signed/date by patient/caregiver   Date IMM was signed 03/04/25   Time IMM was signed [id number]

## 2025-03-05 VITALS
DIASTOLIC BLOOD PRESSURE: 58 MMHG | RESPIRATION RATE: 18 BRPM | WEIGHT: 289.88 LBS | HEIGHT: 73 IN | TEMPERATURE: 98 F | SYSTOLIC BLOOD PRESSURE: 100 MMHG | BODY MASS INDEX: 38.42 KG/M2 | OXYGEN SATURATION: 96 % | HEART RATE: 71 BPM

## 2025-03-05 LAB
ALBUMIN SERPL BCP-MCNC: 2 G/DL (ref 3.5–5.2)
ALP SERPL-CCNC: 75 U/L (ref 55–135)
ALT SERPL W/O P-5'-P-CCNC: 17 U/L (ref 10–44)
ANION GAP SERPL CALC-SCNC: 8 MMOL/L (ref 8–16)
AST SERPL-CCNC: 55 U/L (ref 10–40)
BASOPHILS # BLD AUTO: 0.08 K/UL (ref 0–0.2)
BASOPHILS NFR BLD: 1.5 % (ref 0–1.9)
BILIRUB SERPL-MCNC: 3.5 MG/DL (ref 0.1–1)
BUN SERPL-MCNC: 30 MG/DL (ref 8–23)
CALCIUM SERPL-MCNC: 8.7 MG/DL (ref 8.7–10.5)
CHLORIDE SERPL-SCNC: 89 MMOL/L (ref 95–110)
CO2 SERPL-SCNC: 33 MMOL/L (ref 23–29)
CREAT SERPL-MCNC: 2 MG/DL (ref 0.5–1.4)
DIFFERENTIAL METHOD BLD: ABNORMAL
EOSINOPHIL # BLD AUTO: 0.2 K/UL (ref 0–0.5)
EOSINOPHIL NFR BLD: 2.8 % (ref 0–8)
ERYTHROCYTE [DISTWIDTH] IN BLOOD BY AUTOMATED COUNT: 19 % (ref 11.5–14.5)
EST. GFR  (NO RACE VARIABLE): 35 ML/MIN/1.73 M^2
GLUCOSE SERPL-MCNC: 82 MG/DL (ref 70–110)
HCT VFR BLD AUTO: 29.9 % (ref 40–54)
HGB BLD-MCNC: 10 G/DL (ref 14–18)
IMM GRANULOCYTES # BLD AUTO: 0.06 K/UL (ref 0–0.04)
IMM GRANULOCYTES NFR BLD AUTO: 1.1 % (ref 0–0.5)
LYMPHOCYTES # BLD AUTO: 1.2 K/UL (ref 1–4.8)
LYMPHOCYTES NFR BLD: 22.4 % (ref 18–48)
MAGNESIUM SERPL-MCNC: 1.8 MG/DL (ref 1.6–2.6)
MCH RBC QN AUTO: 30.9 PG (ref 27–31)
MCHC RBC AUTO-ENTMCNC: 33.4 G/DL (ref 32–36)
MCV RBC AUTO: 92 FL (ref 82–98)
MONOCYTES # BLD AUTO: 0.8 K/UL (ref 0.3–1)
MONOCYTES NFR BLD: 14.3 % (ref 4–15)
NEUTROPHILS # BLD AUTO: 3.2 K/UL (ref 1.8–7.7)
NEUTROPHILS NFR BLD: 57.9 % (ref 38–73)
NRBC BLD-RTO: 0 /100 WBC
PLATELET # BLD AUTO: 89 K/UL (ref 150–450)
PMV BLD AUTO: 12.4 FL (ref 9.2–12.9)
POTASSIUM SERPL-SCNC: 4.6 MMOL/L (ref 3.5–5.1)
PROT SERPL-MCNC: 6.5 G/DL (ref 6–8.4)
RBC # BLD AUTO: 3.24 M/UL (ref 4.6–6.2)
SODIUM SERPL-SCNC: 130 MMOL/L (ref 136–145)
WBC # BLD AUTO: 5.45 K/UL (ref 3.9–12.7)

## 2025-03-05 PROCEDURE — 94640 AIRWAY INHALATION TREATMENT: CPT

## 2025-03-05 PROCEDURE — 83735 ASSAY OF MAGNESIUM: CPT | Performed by: STUDENT IN AN ORGANIZED HEALTH CARE EDUCATION/TRAINING PROGRAM

## 2025-03-05 PROCEDURE — 94761 N-INVAS EAR/PLS OXIMETRY MLT: CPT

## 2025-03-05 PROCEDURE — 25000003 PHARM REV CODE 250: Performed by: STUDENT IN AN ORGANIZED HEALTH CARE EDUCATION/TRAINING PROGRAM

## 2025-03-05 PROCEDURE — 25000003 PHARM REV CODE 250

## 2025-03-05 PROCEDURE — 36415 COLL VENOUS BLD VENIPUNCTURE: CPT | Performed by: STUDENT IN AN ORGANIZED HEALTH CARE EDUCATION/TRAINING PROGRAM

## 2025-03-05 PROCEDURE — 97161 PT EVAL LOW COMPLEX 20 MIN: CPT

## 2025-03-05 PROCEDURE — 80053 COMPREHEN METABOLIC PANEL: CPT | Performed by: STUDENT IN AN ORGANIZED HEALTH CARE EDUCATION/TRAINING PROGRAM

## 2025-03-05 PROCEDURE — 99900031 HC PATIENT EDUCATION (STAT)

## 2025-03-05 PROCEDURE — 99900035 HC TECH TIME PER 15 MIN (STAT)

## 2025-03-05 PROCEDURE — 85025 COMPLETE CBC W/AUTO DIFF WBC: CPT | Performed by: STUDENT IN AN ORGANIZED HEALTH CARE EDUCATION/TRAINING PROGRAM

## 2025-03-05 PROCEDURE — 25000242 PHARM REV CODE 250 ALT 637 W/ HCPCS: Performed by: INTERNAL MEDICINE

## 2025-03-05 RX ORDER — LANOLIN ALCOHOL/MO/W.PET/CERES
100 CREAM (GRAM) TOPICAL DAILY
Qty: 30 TABLET | Refills: 2 | Status: SHIPPED | OUTPATIENT
Start: 2025-03-05

## 2025-03-05 RX ORDER — MAGNESIUM CHLORIDE 64 MG
128 TABLET, DELAYED RELEASE (ENTERIC COATED) ORAL DAILY
Qty: 30 TABLET | Refills: 2 | Status: SHIPPED | OUTPATIENT
Start: 2025-03-05 | End: 2025-03-12 | Stop reason: SDUPTHER

## 2025-03-05 RX ORDER — FUROSEMIDE 80 MG/1
80 TABLET ORAL DAILY
Qty: 30 TABLET | Refills: 11 | Status: SHIPPED | OUTPATIENT
Start: 2025-03-05 | End: 2025-03-21

## 2025-03-05 RX ORDER — FAMOTIDINE 20 MG/1
20 TABLET, FILM COATED ORAL DAILY
Qty: 30 TABLET | Refills: 11 | Status: SHIPPED | OUTPATIENT
Start: 2025-03-05 | End: 2026-03-05

## 2025-03-05 RX ORDER — TRAMADOL HYDROCHLORIDE 50 MG/1
50 TABLET ORAL EVERY 6 HOURS PRN
Qty: 28 EACH | Refills: 0 | Status: SHIPPED | OUTPATIENT
Start: 2025-03-05

## 2025-03-05 RX ADMIN — FAMOTIDINE 20 MG: 20 TABLET, FILM COATED ORAL at 09:03

## 2025-03-05 RX ADMIN — METOPROLOL TARTRATE 25 MG: 25 TABLET, FILM COATED ORAL at 09:03

## 2025-03-05 RX ADMIN — LEVALBUTEROL 1.25 MG: 1.25 SOLUTION, CONCENTRATE RESPIRATORY (INHALATION) at 01:03

## 2025-03-05 RX ADMIN — LACTULOSE 30 G: 20 SOLUTION ORAL at 02:03

## 2025-03-05 RX ADMIN — POTASSIUM BICARBONATE 25 MEQ: 978 TABLET, EFFERVESCENT ORAL at 09:03

## 2025-03-05 RX ADMIN — DICLOFENAC SODIUM 4 G: 10 GEL TOPICAL at 02:03

## 2025-03-05 RX ADMIN — DICLOFENAC SODIUM 4 G: 10 GEL TOPICAL at 09:03

## 2025-03-05 RX ADMIN — TAMSULOSIN HYDROCHLORIDE 0.4 MG: 0.4 CAPSULE ORAL at 09:03

## 2025-03-05 RX ADMIN — MIDODRINE HYDROCHLORIDE 5 MG: 5 TABLET ORAL at 06:03

## 2025-03-05 RX ADMIN — FUROSEMIDE 80 MG: 40 TABLET ORAL at 09:03

## 2025-03-05 RX ADMIN — MIDODRINE HYDROCHLORIDE 5 MG: 5 TABLET ORAL at 02:03

## 2025-03-05 RX ADMIN — LACTULOSE 30 G: 20 SOLUTION ORAL at 09:03

## 2025-03-05 RX ADMIN — LEVALBUTEROL 1.25 MG: 1.25 SOLUTION, CONCENTRATE RESPIRATORY (INHALATION) at 07:03

## 2025-03-05 RX ADMIN — MAGNESIUM 64 MG (MAGNESIUM CHLORIDE) TABLET,DELAYED RELEASE 128 MG: at 09:03

## 2025-03-05 RX ADMIN — Medication 100 MG: at 09:03

## 2025-03-05 NOTE — DISCHARGE SUMMARY
[facility] Medicine  Discharge Summary      Patient Name: Juan Carlos Yoo Sr.  MRN: [id number]  FREDIS: [id number]  Patient Class: IP- Inpatient  Admission Date: 2/21/2025  Hospital Length of Stay: 11 days  Discharge Date and Time: No discharge date for patient encounter.  Attending Physician: Joao Villegas III, MD   Discharging Provider: Pilar Gates NP  Primary Care Provider: Joao Villegas III, MD    Primary Care Team: Networked reference to record PCT     HPI:   ED HPI:  Juan Carlos Yoo Sr. is a 69 y.o. male with PMHX of alcoholic cirrhosis, HCC, thrombocytopenia, Afib (not on anticoagulation due to thrombocytopenia), ESRD on HD M/W/F who presents to the emergency department C/O SOB.     Patient was recently  admitted to [facility] due to severe anasarca JAE, evaluated for hepatorenal syndrome and had 40 day hospitalization.  Patient released from rehab about a week ago.  Has endorse increasing shortness of breath for the past 3 days.  States at night it is worse when trying to sleep.  Patient lays on his side and uses 3 pillows to prop his head up.  Patient went to urgent care today because he felt like tonight was going to be another bad night and they sent him to ER.  He denies fever.  States swelling is about baseline.  Had typical dialysis session today.  He reports they were trying to take off 3 L but is unsure of how much volume was removed.  He makes urine but states it is difficult to urinate due to anasarca.  He states compliance to low-sodium diet and fluid restriction since recent discharge.  Has not had alcohol in over 7 months.    IM HPI:  Patient's chart reviewed and above history noted.  Patient's been admitted for acute on chronic dyspnea and was diagnosed with rhinovirus.  Patient had been having increasing dyspnea weakness coughing with clear sputum production over the last 36 hours and ultimately presented with worsening shortness of breath.  He has been  placed on oxygen nasal cannula states he is feeling much better.  Patient has a mild cough and wheezing on exam.  Patient has a complex medical history that is has been reviewed he seems slightly hypervolemic we will notify his nephrologist and he will likely need his routine hemodialysis if he ends up staying through Monday.  Patient is afebrile labs noted potassium low we will replete and maybe a good candidate for Aldactone.    Procedure(s) (LRB):  Incision and Drainage, thigh (Right)      Hospital Course:   2/23 FM:  Patient complaining of a area of redness and pain on the right thigh above the knee.  This started yesterday.  Patient's exam consistent with a area of focal cellulitis and induration.  Ordering ultrasound and starting antibiotics.  Patient's respiratory complaints are much improved and getting back to his baseline.  Patient's nephrologist aware he is in house and may need hemodialysis in a.m..    2/24 DL:  Patient doing well this morning.  He is sitting up in bed in his awake, alert, oriented.  Patient reports respiratory status improved.  Patient now off of supplemental O2.  Patient reports continued tenderness to area of redness to right inner thigh.  Redness and induration have spread past previously marked borders.  Patient is scheduled for ultrasound today.  Continue IV antibiotics.  Patient is scheduled for hemodialysis today.  Nephrology following.    2/25 DL:  Patient doing well this morning.  He is lying in bed in his awake, alert, oriented.  Respiratory status with continued improvement.  Labs and vital signs stable.  Ultrasound right thigh obtained yesterday reveals 12.6 x 6.1 x 3.0 cm complex fluid collection to the medial right thigh.  Venous ultrasound negative for DVT.  General surgery consulted.  Continue current IV antibiotics.  Renal function stable.  Nephrology following.    2/26 DL:  Patient doing well this morning.  He is lying in bed in his awake, alert, oriented.  Respiratory  status at baseline.  Vital signs stable.  Patient afebrile without leukocytosis.  Continue current antibiotic treatment for abscess to right thigh.  General surgery consulted.  Appreciate recs.  Hypokalemia and hypomagnesemia noted this morning.  We will replenish.  Nephrology following.  Appreciate recs.    2/27 DL:  Patient lying in bed resting comfortably.  He has just returned to room from OR for I and D of right inner thigh.  Patient tolerated procedure well.  Patient is afebrile without leukocytosis this morning.  We will continue current IV antibiotic treatment for right thigh abscess pending results of culture obtained per general surgery this morning.  Patient's potassium 2.8 this morning.  Continue p.o. replacement.  We will also give IV replacement today.  Magnesium pending.  Labs and vital signs otherwise stable.  Renal function stable.  Nephrology following.    2/28 DL:  Patient is sitting up on side of bed eating breakfast.  He endorses pain to right inner thigh I&D site that is controlled with current regimen.  Wound cultures with no growth to date x1 day.  Continue current antibiotic treatment.  Continued hypokalemia noted.  Continue p.o. replacement t.i.d.  We we will also give IV replacement today.  Hypomagnesemia also noted.  We will replenish today.  Renal function stable.  Nephrology following.  Labs and vital signs otherwise stable.  Patient with continued improved respiratory status.  Continue discharge planning.    3/1 DL:  Patient doing well this morning.  He is lying in bed is awake, alert, oriented.  Patient reports pain to right thigh controlled with current regimen.  Scant drainage from I&D site.  Patient is afebrile without leukocytosis.  Anaerobic culture pending.  Aerobic culture with no growth to date.  Continue current IV antibiotics.  Respiratory status stable.  Patient with continued hypokalemia.  We will switch p.o. potassium to effervescent potassium.  Additional K riders given.   Continue monitoring with daily labs.  Continue discharge planning.      3/2 DL:  Patient lying bed awake oriented.  POD #3 I&D right thigh.  He reports pain to right thigh controlled.  Patient states he has been able to ambulate in the room without difficulty.  Scant serous drainage noted from Penrose drain.  Wound cultures with no growth and pending.  Continue current IV antibiotics.  Patient afebrile without leukocytosis.  Remaining labs pending.  We will follow up once completed and continue to replace K hypokalemia continues.  Continue current treatment plan.  Continue discharge planning.    3/3 DL:  Patient lying in bed and is awake, alert, oriented.  Pod 4. Right thigh I&D.  Pain control.  Wound cultures pending and was IV antibiotics.  Patient afebrile without leukocytosis to 3.3.  Mag 1.3.  We will replenish.  Continue current treatment plan.  Continue discharge planning.  Patient will need follow up with General surgery in outpatient setting.    3/4 DL:  Patient doing well this morning.  He is lying in bed and is awake, alert oriented.  POD#5 right thigh I&D.  Pain controlled.  Scant drainage from Penrose drain noted.  Wound cultures with no growth and pending.  General surgery following.  Appreciate recs.  Patient afebrile without leukocytosis.  Continue current IV antibiotics.  Potassium and magnesium within normal limits today.  Continue monitoring with daily labs.  Continue discharge planning.    3/5 DL:  Well this morning.  He is lying in bed is awake, alert, oriented.  Pain controlled to right thigh I&D site.  Scant drainage noted from Penrose drain.  Wound cultures with no growth.  Patient afebrile without leukocytosis.  He has completed IV antibiotic therapy.  Electrolytes within normal in its.  We will discharge patient home with home health today.  We will recheck labs weekly with home health.  Patient to follow up with General surgery in approximately 1 week for Penrose drain removal.  Patient  will need outpatient follow up with Nephrology as well.     Goals of Care Treatment Preferences:  Code Status: Full Code    Health care agent: Pt reports he has boni TREVINO and his son is URIEL.  Health care agent number: No value filed.          What is most important right now is to focus on remaining as independent as possible, symptom/pain control, extending life as long as possible, even it it means sacrificing quality, curative/life-prolongation (regardless of treatment burdens).  Accordingly, we have decided that the best plan to meet the patient's goals includes continuing with treatment.      SDOH Screening:  The patient was screened for utility difficulties, food insecurity, transport difficulties, housing insecurity, and interpersonal safety and there were no concerns identified this admission.     Consults:   Consults (From admission, onward)          Status Ordering Provider     Inpatient consult to General Surgery  Once        Provider:  Rosemarie Meng MD    Completed GABI FELIZ     Inpatient consult to Nephrology  Once        Provider:  Adali Garcia MD    Acknowledged ROSEMARIE MENG            Assessment & Plan  Rhinovirus infection  Conservative care, + now with O2 needs.    2/24:  Continue conservative care.  Symptoms improving.  Patient with stable O2 sats on room air.    2/27:  Stable.  Continue conservative care.    3/2:  Resolved    Cirrhosis  Patient with known Cirrhosis with Child's class C. Co-morbidities are present and inclusive of portal hypertension, hepatic encephalopathy, malnutrition, anemia/pancytopenia, and anticoagulation.  MELD-Na score calculated; MELD 3.0: 28 at 2/23/2025  5:15 AM  MELD-Na: 26 at 2/23/2025  5:15 AM  Calculated from:  Serum Creatinine: 1.8 mg/dL at 2/23/2025  5:15 AM  Serum Sodium: 133 mmol/L at 2/23/2025  5:15 AM  Total Bilirubin: 3.9 mg/dL at 2/23/2025  5:15 AM  Serum Albumin: 2.1 g/dL at 2/23/2025  5:15 AM  INR(ratio): 1.9 at 2/21/2025   6:42 PM  Age at listing (hypothetical): 69 years  Sex: Male at 2/23/2025  5:15 AM      Continue chronic meds. Etiology likely ETOH. Will avoid any hepatotoxic meds, and monitor CBC/CMP/INR for synthetic function.   Coagulopathy  CLD    Longstanding persistent atrial fibrillation  Patient has long standing persistent (>12 months) atrial fibrillation. Patient is currently in atrial fibrillation. [id number] Score: 1. The patients heart rate in the last 24 hours is as follows:  Pulse  Min: 70  Max: 109     Antiarrhythmics  metoprolol tartrate (LOPRESSOR) tablet 25 mg, 2 times daily, Oral    Anticoagulants       Plan  - Replete lytes with a goal of K>4, Mg >2  - Patient is anticoagulated, see medications listed above.  - Patient's afib is currently controlled    Auto anticoagulated with CLD.      Suspected Hepatorenal syndrome  Slightly hypervolemic today, much improved from past care.    CKD (chronic kidney disease) stage 4, GFR 15-29 ml/min  Creatine stable for now. BMP reviewed- noted Estimated Creatinine Clearance: 48.2 mL/min (A) (based on SCr of 2 mg/dL (H)). according to latest data. Based on current GFR, CKD stage is stage 5 - GFR < 15.  Monitor UOP and serial BMP and adjust therapy as needed. Renally dose meds. Avoid nephrotoxic medications and procedures.    Per renal, currently on MWF HD, ? Still needs this?    3/5:  Stable.  Patient has not required dialysis throughout admission.  Patient advised to follow up with Nephrology outpatient to discuss need for continued dialysis and/or dialysis cath removal.  Hepatic encephalopathy  Continue lactulose.    Hypotension  Blood pressure stable.  Continue monitoring.    3/5:  Blood pressure stable.  Continue midodrine.    Moderate protein-calorie malnutrition  Nutrition consulted. Most recent weight and BMI monitored-     Measurements:  Wt Readings from Last 1 Encounters:   03/03/25 131.5 kg (289 lb 14.5 oz)   Body mass index is 38.25 kg/m².    Patient has been  screened and assessed by RD.    Malnutrition Type:  Context:    Level:      Malnutrition Characteristic Summary:       Interventions/Recommendations (treatment strategy):  1. Rec'd Renal Cardiac diet. 2. Rec'd ONS: Nepro or Novasource Renal TID to provide additional nutrition. 3. Rec'd Beneprotein TID.  4. Rec'd Lalito BID to promote wound healing and to provide additional nutrition. 5. RD to follow and mkae rec's accordingly.    Morbid obesity  Body mass index is 38.25 kg/m². Morbid obesity complicates all aspects of disease management from diagnostic modalities to treatment. Weight loss encouraged and health benefits explained to patient.       Hypokalemia  Patient's most recent potassium results are listed below.   Recent Labs     03/03/25  0551 03/04/25  0612 03/05/25  0537   K 3.3* 4.5 4.6     Plan  - Replete potassium per protocol  - Monitor potassium Daily  - Patient's hypokalemia is stable  - Nephrology following.  Appreciate recs.    3/1:  Continued hypokalemia noted.  We will adjust p.o. potassium regimen.  Additional K riders given.  Continue monitoring with daily labs.    3/2:  A.m. labs pending.  We will review once resulted.  Plan to continue scheduled p.o. replacement with additional rider's if needed.    3/3:  Continued hypokalemia but overall improving.  Continue p.o. and IV replacement.  Monitor with daily labs.    3/4:  Stable.  Continue monitoring with daily labs.    3/5:  Stable.  We will monitor with weekly labs.  Cellulitis of right lower extremity  2/23 FM:  Starting abx, get US.    2/24:  Increased induration and erythema noted.  Ultrasound pending.  Patient afebrile without leukocytosis.  Continue IV antibiotics.      2/25:  Ultrasound obtained yesterday reveals 12.6 x 6.1 x 3.0 cm complex fluid collection to patient's medial right thigh.  General surgery consulted.  Appreciate recs.  Patient afebrile without leukocytosis.  Continue current IV antibiotic treatment.    2/26:  Patient remains  afebrile without leukocytosis.  Continue antibiotic treatment.  General surgery consulted.  Appreciate recs.    2/27:  S/P I&D per general surgery this morning.  Cultures pending.  Continue current IV abx treatment.    2/28:  Afebrile without leukocytosis.  Cultures pending.  Continue current IV antibiotic treatment.    3/1:  Patient afebrile without leukocytosis.  Cultures pending.  Continue current IV antibiotic treatment.  Pain controlled.    3/2:  POD #3 s/p I&D.  Pain controlled.  Patient remains afebrile without leukocytosis.  Cultures negative and pending.  Continue current plan.    3/3:  POD #4 s/p I&D right thigh. Pain controlled.  Patient afebrile without leukocytosis.  Wound cultures negative and pending.  Current IV antibiotics.  General surgery following.  Appreciate recs.  Continue current treatment plan.    3/4:  POD #5 s/p I&D right thigh.  Pain controlled.  Scant drainage noted from Penrose drain.  Patient afebrile without leukocytosis.  Wound cultures negative and pending.  Continue current IV antibiotic treatment.  General surgery following.  Appreciate recs.    3/5:  Stable.  Pain controlled.  Scant drainage noted from Penrose drain.  Patient has completed IV antibiotic therapy.  We will discharge home today.  Follow up with General surgery in 1 week for drain removal.      Hypomagnesemia  Patient has Abnormal Magnesium: hypomagnesemia. Will continue to monitor electrolytes closely. Will replace the affected electrolytes and repeat labs to be done after interventions completed. The patient's magnesium results have been reviewed and are listed below.  Recent Labs   Lab 03/05/25  0537   MG 1.8        Final Active Diagnoses:    Diagnosis Date Noted POA    PRINCIPAL PROBLEM:  Rhinovirus infection [B34.8] 02/22/2025 Yes    Hypomagnesemia [E83.42] 02/24/2025 Yes    Cellulitis of right lower extremity [L03.115] 02/23/2025 Yes    Morbid obesity [E66.01] 12/19/2024 Yes    Hypokalemia [E87.6] 12/19/2024  Yes    Moderate protein-calorie malnutrition [E44.0] 12/12/2024 Yes    Hypotension [I95.9] 12/05/2024 Yes    Hepatic encephalopathy [K76.82] 11/21/2024 Yes    CKD (chronic kidney disease) stage 4, GFR 15-29 ml/min [N18.4] 11/20/2024 Yes    Suspected Hepatorenal syndrome [K76.7] 10/05/2024 Yes    Longstanding persistent atrial fibrillation [I48.11] 11/29/2022 Yes    Coagulopathy [D68.9] 06/15/2022 Yes    Cirrhosis [K74.60] 07/24/2014 Yes      Problems Resolved During this Admission:       Discharged Condition: stable    Disposition: Home-Health Care [facility]    Follow Up:   Follow-up Information       Joao Villegas III, MD. Schedule an appointment as soon as possible for a visit in 1 week(s).    Specialty: Internal Medicine  Why: With Dr. Villegas  Contact information:  [address] [facility] [address]  [phone]               Adali Garcia MD. Schedule an appointment as soon as possible for a visit in 1 week(s).    Specialty: Nephrology  Contact information:  [address] [facility]  [facility]  [facility] [address] [phone]               Kayla Foster MD. Schedule an appointment as soon as possible for a visit in 1 week(s).    Specialty: General Surgery  Contact information:  [address] [address]  [phone]                           Patient Instructions:      Ambulatory referral/consult to Home Health   Standing Status: Future   Referral Priority: Routine Referral Type: Home Health   Referral Reason: Specialty Services Required   Requested Specialty: Home Health Services   Number of Visits Requested: 1     Diet renal     Activity as tolerated       Significant Diagnostic Studies: Labs: All labs within the past 24 hours have been reviewed    Pending Diagnostic Studies:       None           Medications:  Reconciled Home Medications:      Medication List        START taking these medications      magnesium chloride 64 mg Tbec  Commonly known as: MAG  64  Take 2 tablets (128 mg total) by mouth once daily.     potassium bicarbonate disintegrating tablet  Commonly known as: K-LYTE  Take 1 tablet (25 mEq total) by mouth 2 (two) times a day.     thiamine 100 MG tablet  Take 1 tablet (100 mg total) by mouth once daily.     traMADoL 50 mg tablet  Commonly known as: ULTRAM  Take 1 tablet (50 mg total) by mouth every 6 (six) hours as needed for Pain.            CHANGE how you take these medications      famotidine 20 MG tablet  Commonly known as: PEPCID  Take 1 tablet (20 mg total) by mouth once daily.  What changed: when to take this     furosemide 80 MG tablet  Commonly known as: LASIX  Take 1 tablet (80 mg total) by mouth once daily.  What changed: when to take this     lactulose 20 gram/30 mL Soln  Commonly known as: CHRONULAC  Take 45 mLs (30 g total) by mouth 3 (three) times daily.  What changed: additional instructions            CONTINUE taking these medications      metoprolol tartrate 25 MG tablet  Commonly known as: LOPRESSOR  TAKE 0.5 TABLETS BY MOUTH 2 TIMES DAILY.     midodrine 5 MG Tab  Commonly known as: PROAMATINE  Take 3 tablets (15 mg total) by mouth every 8 (eight) hours.     tamsulosin 0.4 mg Cap  Commonly known as: FLOMAX  Take by mouth.            STOP taking these medications      potassium chloride 20 mEq  Commonly known as: K-TAB     TUBERSOL IDRM              Indwelling Lines/Drains at time of discharge:   Lines/Drains/Airways       Central Venous Catheter Line  Duration                  Hemodialysis Catheter 12/16/24 1152 right internal jugular 79 days              Drain  Duration                  Open Drain 02/27/25 0815 Tube - 1 Right Thigh Penrose 1/4 inch 6 days                    Time spent on the discharge of patient: 35 minutes         Pilar Gates NP  Department of Hospital Medicine  [facility]

## 2025-03-05 NOTE — DISCHARGE INSTRUCTIONS
Our goal at [facility] is to always give you outstanding care and exceptional service. You may receive a survey from [employer] by mail, text or e-mail in the next 24-48 hours asking about the care you received with us. The survey should only take 5-10 minutes to complete and is very important to us.     Your feedback provides us with a way to recognize our staff who work tirelessly to provide the best care! Also, your responses help us learn how to improve when your experience was below our aspiration of excellence. We are always looking for ways to improve your stay. We WILL use your feedback to continue making improvements to help us provide the highest quality care. We keep your personal information and feedback confidential. We appreciate your time completing this survey and can't wait to hear from you!!!    We look forward to your continued care with us! Thanks so much for choosing [facility] for your healthcare needs!

## 2025-03-05 NOTE — ASSESSMENT & PLAN NOTE
Creatine stable for now. BMP reviewed- noted Estimated Creatinine Clearance: 48.2 mL/min (A) (based on SCr of 2 mg/dL (H)). according to latest data. Based on current GFR, CKD stage is stage 5 - GFR < 15.  Monitor UOP and serial BMP and adjust therapy as needed. Renally dose meds. Avoid nephrotoxic medications and procedures.    Per renal, currently on MWF HD, ? Still needs this?    3/5:  Stable.  Patient has not required dialysis throughout admission.  Patient advised to follow up with Nephrology outpatient to discuss need for continued dialysis and/or dialysis cath removal.

## 2025-03-05 NOTE — ASSESSMENT & PLAN NOTE
Blood pressure stable.  Continue monitoring.    3/5:  Blood pressure stable.  Continue midodrine.

## 2025-03-05 NOTE — ASSESSMENT & PLAN NOTE
Patient has Abnormal Magnesium: hypomagnesemia. Will continue to monitor electrolytes closely. Will replace the affected electrolytes and repeat labs to be done after interventions completed. The patient's magnesium results have been reviewed and are listed below.  Recent Labs   Lab 03/05/25  0537   MG 1.8

## 2025-03-05 NOTE — ASSESSMENT & PLAN NOTE
2/23 FM:  Starting abx, get US.    2/24:  Increased induration and erythema noted.  Ultrasound pending.  Patient afebrile without leukocytosis.  Continue IV antibiotics.      2/25:  Ultrasound obtained yesterday reveals 12.6 x 6.1 x 3.0 cm complex fluid collection to patient's medial right thigh.  General surgery consulted.  Appreciate recs.  Patient afebrile without leukocytosis.  Continue current IV antibiotic treatment.    2/26:  Patient remains afebrile without leukocytosis.  Continue antibiotic treatment.  General surgery consulted.  Appreciate recs.    2/27:  S/P I&D per general surgery this morning.  Cultures pending.  Continue current IV abx treatment.    2/28:  Afebrile without leukocytosis.  Cultures pending.  Continue current IV antibiotic treatment.    3/1:  Patient afebrile without leukocytosis.  Cultures pending.  Continue current IV antibiotic treatment.  Pain controlled.    3/2:  POD #3 s/p I&D.  Pain controlled.  Patient remains afebrile without leukocytosis.  Cultures negative and pending.  Continue current plan.    3/3:  POD #4 s/p I&D right thigh. Pain controlled.  Patient afebrile without leukocytosis.  Wound cultures negative and pending.  Current IV antibiotics.  General surgery following.  Appreciate recs.  Continue current treatment plan.    3/4:  POD #5 s/p I&D right thigh.  Pain controlled.  Scant drainage noted from Penrose drain.  Patient afebrile without leukocytosis.  Wound cultures negative and pending.  Continue current IV antibiotic treatment.  General surgery following.  Appreciate recs.    3/5:  Stable.  Pain controlled.  Scant drainage noted from Penrose drain.  Patient has completed IV antibiotic therapy.  We will discharge home today.  Follow up with General surgery in 1 week for drain removal.

## 2025-03-05 NOTE — ASSESSMENT & PLAN NOTE
Patient's most recent potassium results are listed below.   Recent Labs     03/03/25  0551 03/04/25  0612 03/05/25  0537   K 3.3* 4.5 4.6     Plan  - Replete potassium per protocol  - Monitor potassium Daily  - Patient's hypokalemia is stable  - Nephrology following.  Appreciate recs.    3/1:  Continued hypokalemia noted.  We will adjust p.o. potassium regimen.  Additional K riders given.  Continue monitoring with daily labs.    3/2:  A.m. labs pending.  We will review once resulted.  Plan to continue scheduled p.o. replacement with additional rider's if needed.    3/3:  Continued hypokalemia but overall improving.  Continue p.o. and IV replacement.  Monitor with daily labs.    3/4:  Stable.  Continue monitoring with daily labs.    3/5:  Stable.  We will monitor with weekly labs.

## 2025-03-05 NOTE — PLAN OF CARE
Follow up appointment made with Dr. Villegas (PCP).  Attempted to make follow up appointments with nephrology and general surgery but there was no answer x 2.  Patient to make own appointments.

## 2025-03-05 NOTE — ASSESSMENT & PLAN NOTE
Patient has long standing persistent (>12 months) atrial fibrillation. Patient is currently in atrial fibrillation. [id number] Score: 1. The patients heart rate in the last 24 hours is as follows:  Pulse  Min: 70  Max: 109     Antiarrhythmics  metoprolol tartrate (LOPRESSOR) tablet 25 mg, 2 times daily, Oral    Anticoagulants       Plan  - Replete lytes with a goal of K>4, Mg >2  - Patient is anticoagulated, see medications listed above.  - Patient's afib is currently controlled    Auto anticoagulated with CLD.

## 2025-03-05 NOTE — PLAN OF CARE
[facility] S[facility]  Discharge Final Note    Primary Care Provider: Joao Villegas III, MD    Expected Discharge Date: 3/5/2025    Final Discharge Note (most recent)       Final Note - 03/05/25 1406          Final Note    Assessment Type Final Discharge Note     Anticipated Discharge Disposition Home-Health Care Svc        Post-Acute Status    Post-Acute Authorization Home Health     Home Health Status Set-up Complete/Auth obtained     Discharge Delays None known at this time                     Important Message from Medicare  Important Message from Medicare regarding Discharge Appeal Rights: Given to patient/caregiver, Explained to patient/caregiver, Signed/date by patient/caregiver     Date IMM was signed: 03/04/25  Time IMM was signed: 1705     Follow-up providers       Joao Villegas III, MD   Specialty: Internal Medicine   Relationship: PCP - General    [facility] [facility] [address]   Phone: [phone]       Next Steps: Go on 3/17/2025    Instructions: 9:00am.    Adali Garcia MD   Specialty: Nephrology    [address] [facility] [address]  [facility]  [facility] [address]   Phone: [phone]       Next Steps: Schedule an appointment as soon as possible for a visit in 1 week(s)    Kayla Foster MD   Specialty: General Surgery    [address] [address]   Phone: [phone]       Next Steps: Schedule an appointment as soon as possible for a visit in 1 week(s)              After-discharge care                Home Medical Care       *[facility]   Service: Home Nursing    [address] [facility] [address]   Phone: [phone]                           Final note is completed. The patient will be discharging home with his son, Juan Carlos Selby. The patient's nurse and I had a lengthy discussion with the patient about his discharge. The patient was able to ambulate 823 ft with RW according to therapy's note and they feel that that patient does not  need physical therapy services.. I explained to the patient and his son that he will have home health following him, and he will need to follow-up with his physicians.

## 2025-03-05 NOTE — PT/OT/SLP EVAL
"Physical Therapy Evaluation     Patient Name: Juan Carlos Yoo Sr.   MRN: [id number]  Recent Surgery: Procedure(s) (LRB):  Incision and Drainage, thigh (Right) 6 Days Post-Op    Recommendations:     Discharge Recommendations: Low Intensity Therapy   Discharge Equipment Recommendations: none   Barriers to discharge: None    Assessment:     Juan Carlos Yoo Sr. is a 69 y.o. male admitted with a medical diagnosis of Rhinovirus infection. He presents with the following impairments/functional limitations: weakness, impaired balance, gait instability, impaired cardiopulmonary response to activity.   Patient was evaluated by Kvng Bhatt P.T., DPT on 2/25/2025. He documented " Juan Carlos Yoo Sr. is a 69 y.o. male admitted with a medical diagnosis of Rhinovirus infection.  He presents with the following impairments/functional limitations: weakness, impaired endurance, impaired self care skills, impaired functional mobility, pain, impaired cardiopulmonary response to activity, edema, impaired skin. Although these limitations are causing decreased functional mobility and independence, patient reports that it is his PLOF and that he does not need PT while in the hospital. He reports that his son lives nearby to assist him if needed. ". "Patient already at PLOF and refused further acute physical therapy services at this time. "     New P.T. order/consult received on, 2/28/2025. Secure chat sent to Pilar Melgar NP and informed her of P.T. findings from 2/25/2025 and OT notes dated 2/28/2025. P.T. to discharge P.T. orders at that  time due to no change in functional status from base line.    New P.T. consult received again today, 3/5/2025.  Per Dee Hawthorne, , patient reports that he is weak this morning.  She wants to make sure, patient is safe to be discharge to home.    During this re-evaluation on his hospital stay, patient reports that he is going home with his son and he plans to stay with his son until he gets his " "strength back.  He still c/o pain on the right medial thigh.  Functional status at the time of evaluation are as follows: modified independent with supine <> sit, modified independent with sit <> stand with no A.D..  Ambulated ~823 feet with RW with set up assistance on room air, SPO2 post ambulation was 98% with HR of 120 bpm.  Patient is off droplet isolation.     No DME needed at this time, patient has a RW, follow up with home health P.T. to insure safety within the home.      Rehab Prognosis: Fair;    Plan:     During this hospitalization, patient to be seen  (discharge to home today) to address the above listed problems via      Plan of Care Expires:  (Discharge to home today after therapy)    Subjective     Chief Complaint: "I have been walking to the bathroom."   Patient Comments/Goals: "I am going home with my son."   Pain/Comfort:  Pain Rating 1:  (did not quantify)  Location - Side 1: Right  Location - Orientation 1: medial  Location 1: thigh  Pain Addressed 1: Cessation of Activity, Distraction  Pain Rating Post-Intervention 1:  (did not quantify)    Social History:  Living Environment: Patient lives alone in a single story home with  a ramp   Prior Level of Function: Prior to admission, patient was modified independent  Equipment Used at Home: wheelchair, walker, rolling, shower chair, bedside commode  DME owned (not currently used): none  Assistance Upon Discharge: family    Objective:     Communicated with nurse and patient and   prior to session. Patient found HOB elevated with peripheral IV upon PT entry to room.    General Precautions: Standard, other (see comments) (none, patient is off droplet precautions)   Orthopedic Precautions: N/A   Braces: N/A    Respiratory Status: Room air    Exams:  Cognition: Patient is oriented to Person, Place, Time, Situation  RLE ROM: WFL  RLE Strength: WFL  LLE ROM: WFL  LLE Strength: WFL  Gross Motor Coordination: WFL  Postural Exam: Patient presented " with the following abnormalities:    -       Rounded shoulders  -       Forward head  Sensation:    -       Impaired  light/touch on right LE   Skin Integrity/Edema:     -       Skin integrity: intact dressing on the right thigh    Functional Mobility:  Gait belt applied - Yes  Bed Mobility  Rolling Left: modified independence  Rolling Right: modified independence  Scooting: modified independence  Supine to Sit: modified independence for LE management and trunk management  Sit to Supine: modified independence for LE management and trunk management  Transfers  Sit to Stand: modified independence with no AD  Gait  Patient ambulated ~823 feet  with rolling walker and supervision. Patient demonstrates steady gait. , noted wheezing . All lines remained intact throughout ambulation trail.  Balance  Sitting: independence  Standing: modified independence      Therapeutic Activities and Exercises:   Patient educated on role of acute care PT and PT POC, safety while in hospital including calling nurse for mobility, and call light usage    Patient educated about importance of OOB mobility and remaining up in chair most of the day.  Safety with functional mobility.    AM-PAC 6 CLICK MOBILITY  Total Score:23    Patient left HOB elevated with all lines intact, call button in reach, and RN notified.    GOALS:   Multidisciplinary Problems       Physical Therapy Goals       Not on file                    DME Justifications:  No DME recommended requiring DME justifications    History:     Past Medical History:   Diagnosis Date    Atrial fibrillation     Bulging disc     Cirrhosis     Colon polyp 12/29/2017    Rpt 5 yrs    EV (esophageal varices)     Hypertension 03/30/2023    Renal disorder     Skin cancer 03/2017    Thrombocytopenia        Past Surgical History:   Procedure Laterality Date    CATHETERIZATION OF BOTH LEFT AND RIGHT HEART N/A 2/8/2023    Procedure: CATHETERIZATION, HEART, BOTH LEFT AND RIGHT;  Surgeon: Flo SINGH  MD Kira;  Location: [facility] CATH LAB;  Service: Cardiology;  Laterality: N/A;  low bleeding risk 1.0%    CHOLECYSTECTOMY      COLONOSCOPY      COLONOSCOPY N/A 12/29/2017    ta rpt 2022    CORONARY ANGIOGRAPHY N/A 2/8/2023    Procedure: ANGIOGRAM, CORONARY ARTERY;  Surgeon: Flo Malone MD;  Location: [facility] CATH LAB;  Service: Cardiology;  Laterality: N/A;    HERNIA REPAIR      INCISION AND DRAINAGE Right 2/27/2025    Procedure: Incision and Drainage, thigh;  Surgeon: Kayla Foster MD;  Location: [facility];  Service: General;  Laterality: Right;  Plan to go first if pt has cardiac clearance - SB    SKIN BIOPSY  03/2017    TONSILLECTOMY      UPPER GASTROINTESTINAL ENDOSCOPY  2011    EV    UPPER GASTROINTESTINAL ENDOSCOPY  2016    VASECTOMY         Time Tracking:     PT Received On: 03/05/25  PT Start Time: 1103  PT Stop Time: 1130  PT Total Time (min): 27 min     Billable Minutes: Evaluation low complexity     3/5/2025

## 2025-03-06 LAB — BACTERIA SPEC ANAEROBE CULT: NORMAL

## 2025-03-06 NOTE — PLAN OF CARE
Problem: Adult Inpatient Plan of Care  Goal: Plan of Care Review  Outcome: Adequate for Care Transition  Goal: Patient-Specific Goal (Individualized)  Outcome: Adequate for Care Transition  Goal: Absence of Hospital-Acquired Illness or Injury  Outcome: Adequate for Care Transition  Goal: Optimal Comfort and Wellbeing  Outcome: Adequate for Care Transition  Goal: Readiness for Transition of Care  Outcome: Adequate for Care Transition     Problem: Acute Kidney Injury/Impairment  Goal: Fluid and Electrolyte Balance  Outcome: Adequate for Care Transition  Goal: Improved Oral Intake  Outcome: Adequate for Care Transition  Goal: Effective Renal Function  Outcome: Adequate for Care Transition     Problem: Infection  Goal: Absence of Infection Signs and Symptoms  Outcome: Adequate for Care Transition     Problem: Wound  Goal: Optimal Coping  Outcome: Adequate for Care Transition  Goal: Optimal Functional Ability  Outcome: Adequate for Care Transition  Goal: Absence of Infection Signs and Symptoms  Outcome: Adequate for Care Transition  Goal: Improved Oral Intake  Outcome: Adequate for Care Transition  Goal: Optimal Pain Control and Function  Outcome: Adequate for Care Transition  Goal: Skin Health and Integrity  Outcome: Adequate for Care Transition  Goal: Optimal Wound Healing  Outcome: Adequate for Care Transition

## 2025-03-07 ENCOUNTER — LAB VISIT (OUTPATIENT)
Dept: LAB | Facility: HOSPITAL | Age: 70
End: 2025-03-07
Attending: INTERNAL MEDICINE
Payer: COMMERCIAL

## 2025-03-07 DIAGNOSIS — Z48.817 AFTERCARE FOLLOWING SURGERY OF THE SKIN AND SUBCUTANEOUS TISSUE, NEC: ICD-10-CM

## 2025-03-07 DIAGNOSIS — K70.30 ALCOHOLIC CIRRHOSIS OF LIVER: ICD-10-CM

## 2025-03-07 DIAGNOSIS — D68.9 BLOOD CLOTTING DISORDER: ICD-10-CM

## 2025-03-07 DIAGNOSIS — L03.115 CELLULITIS OF RIGHT FOOT: ICD-10-CM

## 2025-03-07 LAB
ALBUMIN SERPL BCP-MCNC: 2 G/DL (ref 3.5–5.2)
ALP SERPL-CCNC: 89 U/L (ref 55–135)
ALT SERPL W/O P-5'-P-CCNC: 19 U/L (ref 10–44)
ANION GAP SERPL CALC-SCNC: 9 MMOL/L (ref 8–16)
AST SERPL-CCNC: 56 U/L (ref 10–40)
BILIRUB SERPL-MCNC: 4.4 MG/DL (ref 0.1–1)
BUN SERPL-MCNC: 32 MG/DL (ref 8–23)
CALCIUM SERPL-MCNC: 8.7 MG/DL (ref 8.7–10.5)
CHLORIDE SERPL-SCNC: 92 MMOL/L (ref 95–110)
CO2 SERPL-SCNC: 31 MMOL/L (ref 23–29)
CREAT SERPL-MCNC: 2 MG/DL (ref 0.5–1.4)
EST. GFR  (NO RACE VARIABLE): 35 ML/MIN/1.73 M^2
GLUCOSE SERPL-MCNC: 90 MG/DL (ref 70–110)
POTASSIUM SERPL-SCNC: 3.6 MMOL/L (ref 3.5–5.1)
PROT SERPL-MCNC: 6.9 G/DL (ref 6–8.4)
SODIUM SERPL-SCNC: 132 MMOL/L (ref 136–145)

## 2025-03-07 PROCEDURE — 36415 COLL VENOUS BLD VENIPUNCTURE: CPT | Performed by: INTERNAL MEDICINE

## 2025-03-07 PROCEDURE — 80053 COMPREHEN METABOLIC PANEL: CPT | Performed by: INTERNAL MEDICINE

## 2025-03-07 NOTE — ASSESSMENT & PLAN NOTE
Blood cultures from admission with B. Diminuta, micrococcus luteus, lysinobacillus species. Seen by ID previously who recommended stopping antibiotics due to concern these were contaminants. Repeat blood cultures have been no growth. Has since been transferred to ICU with increased pressor requirement. Blood cultures repeated on 11/24 are no growth x 24 hours.   11/29 Switched from zosyn to meropenem due to concern of thrombocytopenia.   Finished meropenem course 12/8 @ 6 PM   [FreeTextEntry1] : DOI 12/12/24 Right hand base of fifth suspected fracture  HPI: This is a very pleasant 52-year-old male who is following up with a right base of fifth metacarpal fracture. He reports that his hand and noticed immediate pain and swelling. He thought the pain and swelling would subside however it did not and he went a few days later to an urgent care where x-rays were concerning for a fracture. He was subsequently splinted with a removable wrist brace and told to follow-up with orthopedics.  Interval Hx: 12/31/24: He was placed in a SAC at his last visit for suspected base of fifth metacarpal fracture. Has been compliant with minimal pian.  Interval Hx: 1/17/24: He presents in his short arm cast. Patient states he feels no pain or numbness and tingling. He is here to remove cast and repeat x-rays.  Interval Hx3/7/24: Reports no pain and Full ROM, here today for Xrays to ensure healing.

## 2025-03-11 ENCOUNTER — LAB VISIT (OUTPATIENT)
Dept: LAB | Facility: HOSPITAL | Age: 70
End: 2025-03-11
Payer: COMMERCIAL

## 2025-03-11 DIAGNOSIS — N18.4 CHRONIC KIDNEY DISEASE, STAGE IV (SEVERE): Primary | ICD-10-CM

## 2025-03-11 DIAGNOSIS — K76.7 HEPATORENAL SYNDROME: ICD-10-CM

## 2025-03-11 LAB
ALBUMIN SERPL BCP-MCNC: 1.8 G/DL (ref 3.5–5.2)
ALP SERPL-CCNC: 81 U/L (ref 55–135)
ALT SERPL W/O P-5'-P-CCNC: 17 U/L (ref 10–44)
ANION GAP SERPL CALC-SCNC: 7 MMOL/L (ref 8–16)
AST SERPL-CCNC: 48 U/L (ref 10–40)
BILIRUB SERPL-MCNC: 4.3 MG/DL (ref 0.1–1)
BUN SERPL-MCNC: 19 MG/DL (ref 8–23)
CALCIUM SERPL-MCNC: 8.1 MG/DL (ref 8.7–10.5)
CHLORIDE SERPL-SCNC: 95 MMOL/L (ref 95–110)
CO2 SERPL-SCNC: 34 MMOL/L (ref 23–29)
CREAT SERPL-MCNC: 1.6 MG/DL (ref 0.5–1.4)
EST. GFR  (NO RACE VARIABLE): 45.8 ML/MIN/1.73 M^2
GLUCOSE SERPL-MCNC: 105 MG/DL (ref 70–110)
POTASSIUM SERPL-SCNC: 3.7 MMOL/L (ref 3.5–5.1)
PROT SERPL-MCNC: 6.1 G/DL (ref 6–8.4)
SODIUM SERPL-SCNC: 136 MMOL/L (ref 136–145)

## 2025-03-11 PROCEDURE — 36415 COLL VENOUS BLD VENIPUNCTURE: CPT

## 2025-03-11 PROCEDURE — 80053 COMPREHEN METABOLIC PANEL: CPT

## 2025-03-13 ENCOUNTER — OFFICE VISIT (OUTPATIENT)
Dept: SURGERY | Facility: CLINIC | Age: 70
End: 2025-03-13
Payer: COMMERCIAL

## 2025-03-13 VITALS
HEIGHT: 73 IN | BODY MASS INDEX: 38.57 KG/M2 | SYSTOLIC BLOOD PRESSURE: 117 MMHG | RESPIRATION RATE: 18 BRPM | HEART RATE: 98 BPM | DIASTOLIC BLOOD PRESSURE: 58 MMHG | OXYGEN SATURATION: 97 % | WEIGHT: 291 LBS

## 2025-03-13 DIAGNOSIS — S80.11XS HEMATOMA OF RIGHT LOWER EXTREMITY, SEQUELA: Primary | ICD-10-CM

## 2025-03-13 PROCEDURE — 99999 PR PBB SHADOW E&M-EST. PATIENT-LVL III: CPT | Mod: PBBFAC,,, | Performed by: STUDENT IN AN ORGANIZED HEALTH CARE EDUCATION/TRAINING PROGRAM

## 2025-03-17 NOTE — PROGRESS NOTES
"[facility]  General Surgery Clinic Progress Note        Subjective: Pt is a 69 y.o. male s/o I&D of infected hematoma to right calf who presents for follow up.  Complaint with wound care.  Minimal drainage.  Voices no complaints.       ROS: negative save HPI    PE:  BP (!) 117/58 (BP Location: Left arm, Patient Position: Lying)   Pulse 98   Resp 18   Ht 6' 1" (1.854 m)   Wt 132 kg (291 lb 0.1 oz)   SpO2 97%   BMI 38.39 kg/m²   Wt Readings from Last 2 Encounters:   03/17/25 130.6 kg (288 lb)   03/13/25 132 kg (291 lb 0.1 oz)       Gen: Alert and oriented x3, judgement intact, NAD  CV: RRR  Resp: CTAB; breaths non-labored and symmetrical  Abd: Soft, NT, ND, BS+  Extremities: No c/c/e.  Right calf with penrose in place.  No surrounding erythema or induration.  Scant serous fluid        A/P: Pt is a 69 y.o. male s/p I&D of infected hematoma who presents for follow up  -Penrose removed   -Cleanse daily and cover exit sites with silver Ag and cover with absorbant bandage  -RTC 1 week for wound check         Kayla Foster MD  [phone]      "

## 2025-03-18 ENCOUNTER — LAB VISIT (OUTPATIENT)
Dept: LAB | Facility: HOSPITAL | Age: 70
End: 2025-03-18
Attending: INTERNAL MEDICINE
Payer: COMMERCIAL

## 2025-03-18 DIAGNOSIS — E87.6 HYPOPOTASSEMIA: Primary | ICD-10-CM

## 2025-03-18 LAB
ALBUMIN SERPL BCP-MCNC: 2 G/DL (ref 3.5–5.2)
ALP SERPL-CCNC: 96 U/L (ref 55–135)
ALT SERPL W/O P-5'-P-CCNC: 19 U/L (ref 10–44)
ANION GAP SERPL CALC-SCNC: 8 MMOL/L (ref 8–16)
AST SERPL-CCNC: 56 U/L (ref 10–40)
BILIRUB SERPL-MCNC: 4 MG/DL (ref 0.1–1)
BUN SERPL-MCNC: 15 MG/DL (ref 8–23)
CALCIUM SERPL-MCNC: 8 MG/DL (ref 8.7–10.5)
CHLORIDE SERPL-SCNC: 95 MMOL/L (ref 95–110)
CO2 SERPL-SCNC: 30 MMOL/L (ref 23–29)
CREAT SERPL-MCNC: 1.9 MG/DL (ref 0.5–1.4)
EST. GFR  (NO RACE VARIABLE): 37.2 ML/MIN/1.73 M^2
GLUCOSE SERPL-MCNC: 113 MG/DL (ref 70–110)
POTASSIUM SERPL-SCNC: 3.2 MMOL/L (ref 3.5–5.1)
PROT SERPL-MCNC: 6.6 G/DL (ref 6–8.4)
SODIUM SERPL-SCNC: 133 MMOL/L (ref 136–145)

## 2025-03-18 PROCEDURE — 36415 COLL VENOUS BLD VENIPUNCTURE: CPT | Performed by: INTERNAL MEDICINE

## 2025-03-18 PROCEDURE — 80053 COMPREHEN METABOLIC PANEL: CPT | Performed by: INTERNAL MEDICINE

## 2025-03-19 ENCOUNTER — RESULTS FOLLOW-UP (OUTPATIENT)
Dept: HEPATOLOGY | Facility: HOSPITAL | Age: 70
End: 2025-03-19

## 2025-03-25 ENCOUNTER — LAB REQUISITION (OUTPATIENT)
Dept: LAB | Facility: HOSPITAL | Age: 70
End: 2025-03-25
Payer: COMMERCIAL

## 2025-03-25 DIAGNOSIS — I12.0 BENIGN HYPERTENSION WITH END-STAGE RENAL DISEASE: Primary | ICD-10-CM

## 2025-03-25 DIAGNOSIS — E87.6 HYPOKALEMIA: ICD-10-CM

## 2025-03-25 DIAGNOSIS — K76.82 HEPATIC ENCEPHALOPATHY: ICD-10-CM

## 2025-03-25 DIAGNOSIS — N18.6 BENIGN HYPERTENSION WITH END-STAGE RENAL DISEASE: Primary | ICD-10-CM

## 2025-03-25 DIAGNOSIS — E87.6 HYPOPOTASSEMIA: ICD-10-CM

## 2025-03-25 DIAGNOSIS — I12.0 HYPERTENSIVE CHRONIC KIDNEY DISEASE WITH STAGE 5 CHRONIC KIDNEY DISEASE OR END STAGE RENAL DISEASE: ICD-10-CM

## 2025-03-25 DIAGNOSIS — N18.6 END STAGE RENAL DISEASE: ICD-10-CM

## 2025-03-25 LAB
ALBUMIN SERPL BCP-MCNC: 2 G/DL (ref 3.5–5.2)
ALP SERPL-CCNC: 86 UNIT/L (ref 40–150)
ALT SERPL W/O P-5'-P-CCNC: 17 UNIT/L (ref 10–44)
ANION GAP (OHS): 7 MMOL/L (ref 8–16)
AST SERPL-CCNC: 43 UNIT/L (ref 11–45)
BILIRUB SERPL-MCNC: 4.5 MG/DL (ref 0.1–1)
BUN SERPL-MCNC: 15 MG/DL (ref 8–23)
CALCIUM SERPL-MCNC: 8 MG/DL (ref 8.7–10.5)
CHLORIDE SERPL-SCNC: 99 MMOL/L (ref 95–110)
CO2 SERPL-SCNC: 29 MMOL/L (ref 23–29)
CREAT SERPL-MCNC: 1.8 MG/DL (ref 0.5–1.4)
GFR SERPLBLD CREATININE-BSD FMLA CKD-EPI: 40 ML/MIN/1.73/M2
GLUCOSE SERPL-MCNC: 114 MG/DL (ref 70–110)
POTASSIUM SERPL-SCNC: 3.4 MMOL/L (ref 3.5–5.1)
PROT SERPL-MCNC: 6.4 GM/DL (ref 6–8.4)
SODIUM SERPL-SCNC: 135 MMOL/L (ref 136–145)

## 2025-03-25 PROCEDURE — 82040 ASSAY OF SERUM ALBUMIN: CPT | Performed by: INTERNAL MEDICINE

## 2025-03-27 ENCOUNTER — OFFICE VISIT (OUTPATIENT)
Dept: SURGERY | Facility: CLINIC | Age: 70
End: 2025-03-27
Payer: COMMERCIAL

## 2025-03-27 VITALS
HEART RATE: 110 BPM | SYSTOLIC BLOOD PRESSURE: 106 MMHG | RESPIRATION RATE: 18 BRPM | HEIGHT: 73 IN | WEIGHT: 302.25 LBS | DIASTOLIC BLOOD PRESSURE: 51 MMHG | BODY MASS INDEX: 40.06 KG/M2 | OXYGEN SATURATION: 98 %

## 2025-03-27 DIAGNOSIS — L03.115 CELLULITIS OF RIGHT LOWER EXTREMITY: Primary | ICD-10-CM

## 2025-03-27 DIAGNOSIS — Z49.01 ENCOUNTER FOR DIALYSIS CATHETER CARE: ICD-10-CM

## 2025-03-27 PROCEDURE — 99999 PR PBB SHADOW E&M-EST. PATIENT-LVL III: CPT | Mod: PBBFAC,,,

## 2025-03-27 PROCEDURE — 3074F SYST BP LT 130 MM HG: CPT | Mod: CPTII,S$GLB,,

## 2025-03-27 PROCEDURE — 3008F BODY MASS INDEX DOCD: CPT | Mod: CPTII,S$GLB,,

## 2025-03-27 PROCEDURE — 99213 OFFICE O/P EST LOW 20 MIN: CPT | Mod: S$GLB,,,

## 2025-03-27 PROCEDURE — 1160F RVW MEDS BY RX/DR IN RCRD: CPT | Mod: CPTII,S$GLB,,

## 2025-03-27 PROCEDURE — 1159F MED LIST DOCD IN RCRD: CPT | Mod: CPTII,S$GLB,,

## 2025-03-27 PROCEDURE — 1111F DSCHRG MED/CURRENT MED MERGE: CPT | Mod: CPTII,S$GLB,,

## 2025-03-27 PROCEDURE — 3078F DIAST BP <80 MM HG: CPT | Mod: CPTII,S$GLB,,

## 2025-03-27 NOTE — PROGRESS NOTES
"[facility]  General Surgery Clinic Progress Note        Subjective: Pt is a 69 y.o. male s/o I&D of infected hematoma to right calf who presents for follow up. Pt also here to discuss removal of HD catheter. Reports drainage has resolved to leg wound.      ROS: negative save HPI    PE:  BP (!) 106/51   Pulse 110   Resp 18   Ht 6' 1" (1.854 m)   Wt (!) 137.1 kg (302 lb 4 oz)   SpO2 98%   BMI 39.88 kg/m²   Wt Readings from Last 2 Encounters:   03/27/25 (!) 137.1 kg (302 lb 4 oz)   03/17/25 130.6 kg (288 lb)       Gen: Alert and oriented x3, judgement intact, NAD  CV: RRR  Resp: CTAB; breaths non-labored and symmetrical  Abd: Soft, NT, ND, BS+  Extremities: No c/c/e. Right calf with 2 healed wounds, no drainage or SOI        A/P:  -RLE wounds healed; no further wound care needed  -Per PCP can remove HD catheter; pt no longer requires hemodialysis   -To OR 4/16 for removal of tunnel catheter under light sedation  -Informed consent obtained and signed  -Hold Eliquis for 3 days prior  -Request cardiac clearance to Dr. Ramey   -[facility] to perform weekly and prn sterile dressing changes to R chest HD catheter  -RTC PRN        Adrien Lock NP  General Surgery  [phone]  "

## 2025-04-01 ENCOUNTER — TELEPHONE (OUTPATIENT)
Dept: SURGERY | Facility: CLINIC | Age: 70
End: 2025-04-01
Payer: COMMERCIAL

## 2025-04-01 ENCOUNTER — LAB REQUISITION (OUTPATIENT)
Dept: LAB | Facility: HOSPITAL | Age: 70
End: 2025-04-01
Payer: COMMERCIAL

## 2025-04-01 DIAGNOSIS — E87.6 HYPOKALEMIA: ICD-10-CM

## 2025-04-01 DIAGNOSIS — N18.6 END STAGE RENAL DISEASE: ICD-10-CM

## 2025-04-01 DIAGNOSIS — K76.82 HEPATIC ENCEPHALOPATHY: ICD-10-CM

## 2025-04-01 DIAGNOSIS — I12.0 HYPERTENSIVE CHRONIC KIDNEY DISEASE WITH STAGE 5 CHRONIC KIDNEY DISEASE OR END STAGE RENAL DISEASE: ICD-10-CM

## 2025-04-01 LAB
ALBUMIN SERPL BCP-MCNC: 2 G/DL (ref 3.5–5.2)
ALP SERPL-CCNC: 86 UNIT/L (ref 40–150)
ALT SERPL W/O P-5'-P-CCNC: 12 UNIT/L (ref 10–44)
ANION GAP (OHS): 9 MMOL/L (ref 8–16)
AST SERPL-CCNC: 35 UNIT/L (ref 11–45)
BILIRUB SERPL-MCNC: 4.8 MG/DL (ref 0.1–1)
BUN SERPL-MCNC: 14 MG/DL (ref 8–23)
CALCIUM SERPL-MCNC: 8.1 MG/DL (ref 8.7–10.5)
CHLORIDE SERPL-SCNC: 100 MMOL/L (ref 95–110)
CO2 SERPL-SCNC: 27 MMOL/L (ref 23–29)
CREAT SERPL-MCNC: 1.9 MG/DL (ref 0.5–1.4)
GFR SERPLBLD CREATININE-BSD FMLA CKD-EPI: 37 ML/MIN/1.73/M2
GLUCOSE SERPL-MCNC: 79 MG/DL (ref 70–110)
POTASSIUM SERPL-SCNC: 3.6 MMOL/L (ref 3.5–5.1)
PROT SERPL-MCNC: 6.6 GM/DL (ref 6–8.4)
SODIUM SERPL-SCNC: 136 MMOL/L (ref 136–145)

## 2025-04-01 PROCEDURE — 80053 COMPREHEN METABOLIC PANEL: CPT | Performed by: INTERNAL MEDICINE

## 2025-04-01 NOTE — TELEPHONE ENCOUNTER
Spoke with pts son to advise him to call [facility] to make the pt an appointment, pt son aware and calling today. I reached him at [phone]. [facility] update scanned in media.    25

## 2025-04-03 DIAGNOSIS — Z49.01 ENCOUNTER FOR DIALYSIS CATHETER CARE: Primary | ICD-10-CM

## 2025-04-03 DIAGNOSIS — Z45.2 ENCOUNTER FOR REMOVAL OF TUNNELED CENTRAL VENOUS CATHETER (CVC) WITH PORT: ICD-10-CM

## 2025-04-03 RX ORDER — ONDANSETRON HYDROCHLORIDE 2 MG/ML
4 INJECTION, SOLUTION INTRAVENOUS EVERY 12 HOURS PRN
OUTPATIENT
Start: 2025-04-03

## 2025-04-03 RX ORDER — SODIUM CHLORIDE 9 MG/ML
INJECTION, SOLUTION INTRAVENOUS CONTINUOUS
OUTPATIENT
Start: 2025-04-03

## 2025-04-08 ENCOUNTER — LAB REQUISITION (OUTPATIENT)
Dept: LAB | Facility: HOSPITAL | Age: 70
End: 2025-04-08
Payer: COMMERCIAL

## 2025-04-08 DIAGNOSIS — I12.0 HYPERTENSIVE CHRONIC KIDNEY DISEASE WITH STAGE 5 CHRONIC KIDNEY DISEASE OR END STAGE RENAL DISEASE: ICD-10-CM

## 2025-04-08 DIAGNOSIS — N18.6 END STAGE RENAL DISEASE: ICD-10-CM

## 2025-04-08 DIAGNOSIS — E44.0 MODERATE PROTEIN-CALORIE MALNUTRITION: ICD-10-CM

## 2025-04-08 DIAGNOSIS — K70.30 ALCOHOLIC CIRRHOSIS OF LIVER WITHOUT ASCITES: ICD-10-CM

## 2025-04-08 LAB
ALBUMIN SERPL BCP-MCNC: 1.9 G/DL (ref 3.5–5.2)
ALP SERPL-CCNC: 78 UNIT/L (ref 40–150)
ALT SERPL W/O P-5'-P-CCNC: 10 UNIT/L (ref 10–44)
ANION GAP (OHS): 8 MMOL/L (ref 8–16)
AST SERPL-CCNC: 29 UNIT/L (ref 11–45)
BILIRUB SERPL-MCNC: 4.5 MG/DL (ref 0.1–1)
BUN SERPL-MCNC: 12 MG/DL (ref 8–23)
CALCIUM SERPL-MCNC: 8.1 MG/DL (ref 8.7–10.5)
CHLORIDE SERPL-SCNC: 99 MMOL/L (ref 95–110)
CO2 SERPL-SCNC: 28 MMOL/L (ref 23–29)
CREAT SERPL-MCNC: 1.8 MG/DL (ref 0.5–1.4)
GFR SERPLBLD CREATININE-BSD FMLA CKD-EPI: 40 ML/MIN/1.73/M2
GLUCOSE SERPL-MCNC: 101 MG/DL (ref 70–110)
POTASSIUM SERPL-SCNC: 3.9 MMOL/L (ref 3.5–5.1)
PROT SERPL-MCNC: 6.1 GM/DL (ref 6–8.4)
SODIUM SERPL-SCNC: 135 MMOL/L (ref 136–145)

## 2025-04-08 PROCEDURE — 82310 ASSAY OF CALCIUM: CPT | Performed by: INTERNAL MEDICINE

## 2025-04-10 ENCOUNTER — HOSPITAL ENCOUNTER (OUTPATIENT)
Dept: PREADMISSION TESTING | Facility: HOSPITAL | Age: 70
Discharge: HOME | End: 2025-04-10
Attending: INTERNAL MEDICINE
Payer: COMMERCIAL

## 2025-04-10 VITALS — BODY MASS INDEX: 39.76 KG/M2 | HEIGHT: 73 IN | WEIGHT: 300 LBS

## 2025-04-10 NOTE — PRE-PROCEDURE INSTRUCTIONS
Reviewed pt's history, labs, and EKG results with Dr. Newman. No further intervention needed.     Dr. Newman made aware that pt has an appt with Dr. Liu today (04/10) at 2:00pm for procedure clearance. Will fax when note is completed for review.

## 2025-04-11 NOTE — PRE-PROCEDURE INSTRUCTIONS
Called to req most recent cardiac note from 04/10. I was informed that as of today, pt is not cleared for procedure on 04/16. Pt has an ECHO scheduled for 04/15 with Dr. Liu. Procedure to be discussed once results are received. Dr. Newman and Dr. Foster's office made aware.

## 2025-04-12 ENCOUNTER — HOSPITAL ENCOUNTER (INPATIENT)
Facility: HOSPITAL | Age: 70
LOS: 10 days | Discharge: HOME HEALTH | DRG: 432 | End: 2025-04-24
Attending: EMERGENCY MEDICINE | Admitting: INTERNAL MEDICINE
Payer: COMMERCIAL

## 2025-04-12 DIAGNOSIS — I27.20 PULMONARY HYPERTENSION: ICD-10-CM

## 2025-04-12 DIAGNOSIS — R60.9 WEIGHT GAIN WITH EDEMA: ICD-10-CM

## 2025-04-12 DIAGNOSIS — R60.0 LOWER LEG EDEMA: Primary | ICD-10-CM

## 2025-04-12 DIAGNOSIS — N50.89 SCROTAL EDEMA: ICD-10-CM

## 2025-04-12 DIAGNOSIS — R63.5 WEIGHT GAIN WITH EDEMA: ICD-10-CM

## 2025-04-12 DIAGNOSIS — I50.23 ACUTE ON CHRONIC SYSTOLIC (CONGESTIVE) HEART FAILURE: ICD-10-CM

## 2025-04-12 DIAGNOSIS — R60.1 ANASARCA: ICD-10-CM

## 2025-04-12 LAB
ABSOLUTE EOSINOPHIL (OHS): 0.06 K/UL
ABSOLUTE MONOCYTE (OHS): 0.68 K/UL (ref 0.3–1)
ABSOLUTE NEUTROPHIL COUNT (OHS): 3.71 K/UL (ref 1.8–7.7)
ACANTHOCYTES BLD QL SMEAR: PRESENT
ALBUMIN SERPL BCP-MCNC: 2.2 G/DL (ref 3.5–5.2)
ALP SERPL-CCNC: 86 UNIT/L (ref 40–150)
ALT SERPL W/O P-5'-P-CCNC: 12 UNIT/L (ref 10–44)
ANION GAP (OHS): 10 MMOL/L (ref 8–16)
AST SERPL-CCNC: 47 UNIT/L (ref 11–45)
BASOPHILS # BLD AUTO: 0.05 K/UL
BASOPHILS NFR BLD AUTO: 1 %
BILIRUB SERPL-MCNC: 6.2 MG/DL (ref 0.1–1)
BUN SERPL-MCNC: 13 MG/DL (ref 8–23)
CALCIUM SERPL-MCNC: 8.5 MG/DL (ref 8.7–10.5)
CHLORIDE SERPL-SCNC: 99 MMOL/L (ref 95–110)
CO2 SERPL-SCNC: 23 MMOL/L (ref 23–29)
CREAT SERPL-MCNC: 1.8 MG/DL (ref 0.5–1.4)
ERYTHROCYTE [DISTWIDTH] IN BLOOD BY AUTOMATED COUNT: 15.8 % (ref 11.5–14.5)
GFR SERPLBLD CREATININE-BSD FMLA CKD-EPI: 40 ML/MIN/1.73/M2
GLUCOSE SERPL-MCNC: 112 MG/DL (ref 70–110)
HCT VFR BLD AUTO: 28.8 % (ref 40–54)
HGB BLD-MCNC: 9.4 GM/DL (ref 14–18)
IMM GRANULOCYTES # BLD AUTO: 0.03 K/UL (ref 0–0.04)
IMM GRANULOCYTES NFR BLD AUTO: 0.6 % (ref 0–0.5)
LYMPHOCYTES # BLD AUTO: 0.73 K/UL (ref 1–4.8)
MAGNESIUM SERPL-MCNC: 1.6 MG/DL (ref 1.6–2.6)
MCH RBC QN AUTO: 31.1 PG (ref 27–31)
MCHC RBC AUTO-ENTMCNC: 32.6 G/DL (ref 32–36)
MCV RBC AUTO: 95 FL (ref 82–98)
NT-PROBNP SERPL-MCNC: 1603 PG/ML
NUCLEATED RBC (/100WBC) (OHS): 0 /100 WBC
PLATELET # BLD AUTO: 61 K/UL (ref 150–450)
PLATELET BLD QL SMEAR: ABNORMAL
PMV BLD AUTO: 12.9 FL (ref 9.2–12.9)
POTASSIUM SERPL-SCNC: 3.8 MMOL/L (ref 3.5–5.1)
PROT SERPL-MCNC: 7.1 GM/DL (ref 6–8.4)
RBC # BLD AUTO: 3.02 M/UL (ref 4.6–6.2)
RELATIVE EOSINOPHIL (OHS): 1.1 %
RELATIVE LYMPHOCYTE (OHS): 13.9 % (ref 18–48)
RELATIVE MONOCYTE (OHS): 12.9 % (ref 4–15)
RELATIVE NEUTROPHIL (OHS): 70.5 % (ref 38–73)
SODIUM SERPL-SCNC: 132 MMOL/L (ref 136–145)
WBC # BLD AUTO: 5.26 K/UL (ref 3.9–12.7)

## 2025-04-12 PROCEDURE — 80053 COMPREHEN METABOLIC PANEL: CPT | Performed by: EMERGENCY MEDICINE

## 2025-04-12 PROCEDURE — 25000003 PHARM REV CODE 250: Performed by: EMERGENCY MEDICINE

## 2025-04-12 PROCEDURE — 99285 EMERGENCY DEPT VISIT HI MDM: CPT | Mod: 25

## 2025-04-12 PROCEDURE — 63600175 PHARM REV CODE 636 W HCPCS: Performed by: EMERGENCY MEDICINE

## 2025-04-12 PROCEDURE — 36415 COLL VENOUS BLD VENIPUNCTURE: CPT | Performed by: EMERGENCY MEDICINE

## 2025-04-12 PROCEDURE — G0378 HOSPITAL OBSERVATION PER HR: HCPCS

## 2025-04-12 PROCEDURE — 96365 THER/PROPH/DIAG IV INF INIT: CPT

## 2025-04-12 PROCEDURE — 83735 ASSAY OF MAGNESIUM: CPT | Performed by: EMERGENCY MEDICINE

## 2025-04-12 PROCEDURE — 85025 COMPLETE CBC W/AUTO DIFF WBC: CPT | Performed by: EMERGENCY MEDICINE

## 2025-04-12 PROCEDURE — 83880 ASSAY OF NATRIURETIC PEPTIDE: CPT | Performed by: EMERGENCY MEDICINE

## 2025-04-12 RX ORDER — LANOLIN ALCOHOL/MO/W.PET/CERES
100 CREAM (GRAM) TOPICAL DAILY
Status: ON HOLD | COMMUNITY
End: 2025-05-07 | Stop reason: HOSPADM

## 2025-04-12 RX ORDER — MIDODRINE HYDROCHLORIDE 2.5 MG/1
15 TABLET ORAL
Status: DISCONTINUED | OUTPATIENT
Start: 2025-04-13 | End: 2025-04-14

## 2025-04-12 RX ORDER — THIAMINE HCL 100 MG
100 TABLET ORAL DAILY
Status: DISCONTINUED | OUTPATIENT
Start: 2025-04-13 | End: 2025-04-24 | Stop reason: HOSPADM

## 2025-04-12 RX ORDER — ONDANSETRON HYDROCHLORIDE 2 MG/ML
8 INJECTION, SOLUTION INTRAVENOUS EVERY 8 HOURS PRN
Status: DISCONTINUED | OUTPATIENT
Start: 2025-04-12 | End: 2025-04-24 | Stop reason: HOSPADM

## 2025-04-12 RX ORDER — FAMOTIDINE 20 MG/1
20 TABLET, FILM COATED ORAL 2 TIMES DAILY
Status: DISCONTINUED | OUTPATIENT
Start: 2025-04-12 | End: 2025-04-15

## 2025-04-12 RX ORDER — SPIRONOLACTONE 25 MG/1
25 TABLET ORAL 2 TIMES DAILY
Status: DISCONTINUED | OUTPATIENT
Start: 2025-04-12 | End: 2025-04-13

## 2025-04-12 RX ORDER — TAMSULOSIN HYDROCHLORIDE 0.4 MG/1
0.4 CAPSULE ORAL DAILY
Status: DISCONTINUED | OUTPATIENT
Start: 2025-04-13 | End: 2025-04-24 | Stop reason: HOSPADM

## 2025-04-12 RX ORDER — IBUPROFEN 400 MG/1
400 TABLET ORAL EVERY 6 HOURS PRN
Status: DISCONTINUED | OUTPATIENT
Start: 2025-04-12 | End: 2025-04-13

## 2025-04-12 RX ORDER — METOPROLOL TARTRATE 25 MG/1
25 TABLET, FILM COATED ORAL 2 TIMES DAILY
Status: DISCONTINUED | OUTPATIENT
Start: 2025-04-12 | End: 2025-04-13

## 2025-04-12 RX ORDER — TALC
6 POWDER (GRAM) TOPICAL NIGHTLY PRN
Status: DISCONTINUED | OUTPATIENT
Start: 2025-04-12 | End: 2025-04-24 | Stop reason: HOSPADM

## 2025-04-12 RX ADMIN — FUROSEMIDE 10 MG/HR: 10 INJECTION, SOLUTION INTRAMUSCULAR; INTRAVENOUS at 05:04

## 2025-04-12 NOTE — ED PROVIDER NOTES
"Encounter Date: 4/12/2025       History     Chief Complaint   Patient presents with    Groin Swelling     Pt reports increased swelling to the testicular region as well as swelling in the lower extremities. Pt believes his testicle region may be infected due to using a rag that he uses to clean his rectum, he also uses to clean his testicle region/penis. Pt was seen "roughly one month ago for the same thing." Pt also reports "a little more shortness of breath" than normal."     69-year-old male with a history of AFib, cirrhosis, hypertension, renal failure in the past presents the emergency department with lower leg edema, scrotal swelling gradually getting worse over the last few days.  He is on Lasix and spironolactone.  Complaining of pain due to swelling.  No fever.  He has had lower leg edema multiple times in the past, but has never had the scrotal swelling.  Denies chest pain.  Endorses orthopnea and dyspnea on exertion.  No nausea vomiting.  Afebrile, vital signs are stable      Review of patient's allergies indicates:  No Known Allergies  Past Medical History:   Diagnosis Date    Atrial fibrillation     Bulging disc     Cirrhosis     Colon polyp 12/29/2017    Rpt 5 yrs    Encounter for blood transfusion     EV (esophageal varices)     Hypertension 03/30/2023    Renal disorder     Skin cancer 03/2017    'NOSE"    Thrombocytopenia      Past Surgical History:   Procedure Laterality Date    CATHETERIZATION OF BOTH LEFT AND RIGHT HEART N/A 02/08/2023    Procedure: CATHETERIZATION, HEART, BOTH LEFT AND RIGHT;  Surgeon: Flo Malone MD;  Location: [facility] CATH LAB;  Service: Cardiology;  Laterality: N/A;  low bleeding risk 1.0%    CHOLECYSTECTOMY      COLONOSCOPY      COLONOSCOPY N/A 12/29/2017    ta rpt 2022    CORONARY ANGIOGRAPHY N/A 02/08/2023    Procedure: ANGIOGRAM, CORONARY ARTERY;  Surgeon: Flo Malone MD;  Location: [facility] CATH LAB;  Service: Cardiology;  Laterality: N/A;    HERNIA REPAIR      " INCISION AND DRAINAGE Right 02/27/2025    Procedure: Incision and Drainage, thigh;  Surgeon: Kayla Foster MD;  Location: [facility] OR;  Service: General;  Laterality: Right;  Plan to go first if pt has cardiac clearance - SB    SKIN BIOPSY  03/2017    TONSILLECTOMY      UPPER GASTROINTESTINAL ENDOSCOPY  2011    EV    UPPER GASTROINTESTINAL ENDOSCOPY  2016    VASECTOMY       Family History   Problem Relation Name Age of Onset    Alzheimer's disease Mother      No Known Problems Father      Ovarian cancer Sister      Hyperlipidemia Brother      Cancer Maternal Uncle          Leukemia    Heart disease Maternal Uncle      Colon cancer Neg Hx       Social History[1]  Review of Systems   Constitutional:  Negative for fever.   HENT:  Negative for sore throat.    Respiratory:  Negative for shortness of breath.    Cardiovascular:  Positive for leg swelling. Negative for chest pain.   Gastrointestinal:  Negative for nausea.   Genitourinary:  Positive for scrotal swelling. Negative for dysuria.   Musculoskeletal:  Negative for back pain.   Skin:  Negative for rash.   Neurological:  Negative for weakness.   Hematological:  Does not bruise/bleed easily.   All other systems reviewed and are negative.      Physical Exam     Initial Vitals [04/12/25 1601]   BP Pulse Resp Temp SpO2   134/78 93 18 98 °F (36.7 °C) 98 %      MAP       --         Physical Exam    Nursing note and vitals reviewed.  Constitutional: He appears well-developed and well-nourished. He is not diaphoretic. No distress.   HENT:   Head: Normocephalic and atraumatic.   Eyes: Conjunctivae and EOM are normal. Pupils are equal, round, and reactive to light. Right eye exhibits no discharge. Left eye exhibits no discharge. No scleral icterus.   Neck: Neck supple. No JVD present.   Normal range of motion.  Cardiovascular:  Normal rate, regular rhythm, normal heart sounds and intact distal pulses.           No murmur heard.  Pulmonary/Chest: Breath sounds normal. No  stridor. No respiratory distress. He has no wheezes. He has no rhonchi. He has no rales. He exhibits no tenderness.   Abdominal: Abdomen is soft. Bowel sounds are normal. He exhibits no distension and no mass. There is no abdominal tenderness. There is no rebound and no guarding.   Genitourinary:    Genitourinary Comments: Edema of scrotum noted     Musculoskeletal:         General: Edema present. No tenderness. Normal range of motion.      Cervical back: Normal range of motion and neck supple.     Neurological: He is alert and oriented to person, place, and time. He has normal strength. GCS score is 15. GCS eye subscore is 4. GCS verbal subscore is 5. GCS motor subscore is 6.   Skin: Skin is warm and dry. Capillary refill takes less than 2 seconds.         ED Course   Critical Care    Date/Time: 4/12/2025 5:43 PM    Performed by: Zack Ansari MD  Authorized by: Joao Villegas III, MD  Direct patient critical care time: 15 minutes  Additional history critical care time: 10 minutes  Ordering / reviewing critical care time: 10 minutes  Documentation critical care time: 10 minutes  Consulting other physicians critical care time: 10 minutes  Consult with family critical care time: 10 minutes  Total critical care time (exclusive of procedural time) : 65 minutes  Critical care was necessary to treat or prevent imminent or life-threatening deterioration of the following conditions: Anasarca needing continuous Lasix drip for diuresis.  Critical care was time spent personally by me on the following activities: review of old charts, re-evaluation of patient's condition, pulse oximetry, ordering and review of laboratory studies, ordering and performing treatments and interventions, obtaining history from patient or surrogate, examination of patient, evaluation of patient's response to treatment, discussions with primary provider and development of treatment plan with patient or surrogate.        Labs Reviewed    COMPREHENSIVE METABOLIC PANEL - Abnormal       Result Value    Sodium 132 (*)     Potassium 3.8      Chloride 99      CO2 23      Glucose 112 (*)     BUN 13      Creatinine 1.8 (*)     Calcium 8.5 (*)     Protein Total 7.1      Albumin 2.2 (*)     Bilirubin Total 6.2 (*)     ALP 86      AST 47 (*)     ALT 12      Anion Gap 10      eGFR 40 (*)    NT-PRO NATRIURETIC PEPTIDE - Abnormal    NT-proBNP 1,603 (*)    CBC WITH DIFFERENTIAL - Abnormal    WBC 5.26      RBC 3.02 (*)     HGB 9.4 (*)     HCT 28.8 (*)     MCV 95      MCH 31.1 (*)     MCHC 32.6      RDW 15.8 (*)     Platelet Count 61 (*)     MPV 12.9      Nucleated RBC 0      Neut % 70.5      Lymph % 13.9 (*)     Mono % 12.9      Eos % 1.1      Basophil % 1.0      Imm Grans % 0.6 (*)     Neut # 3.71      Lymph # 0.73 (*)     Mono # 0.68      Eos # 0.06      Baso # 0.05      Imm Grans # 0.03     PLATELET REVIEW - Abnormal    Platelet Estimate Clumped (*)     Narrative:     Platelets are clumped on smear, likely caused by EDTA anticoagulant. Consider recollecting citrate specimen for accurate platelet count.   MAGNESIUM - Normal    Magnesium  1.6     CBC W/ AUTO DIFFERENTIAL    Narrative:     The following orders were created for panel order CBC auto differential.  Procedure                               Abnormality         Status                     ---------                               -----------         ------                     CBC with Differential[[id number]]       Abnormal            Final result                 Please view results for these tests on the individual orders.   MORPHOLOGY    Acanthocytes Present            Imaging Results    None          Medications   furosemide (Lasix) 200 mg in 0.9% NaCl SolP 100 mL continuous infusion (conc: 2 mg/mL) (10 mg/hr Intravenous New Bag 4/12/25 1703)     Medical Decision Making  Amount and/or Complexity of Data Reviewed  Labs: ordered.               ED Course as of 04/12/25 5539   Sat Apr 12, 2025   6171  Discussed case with  - will place in observation for Lasix drip and diuresis due to him being so uncomfortable [SD]   1749 Labs are essentially baseline. [SD]      ED Course User Index  [SD] Zack Ansari MD               Medical Decision Making:   Differential Diagnosis:   Lower leg edema, scrotal swelling, anasarca             Clinical Impression:  Final diagnoses:  [R60.0] Lower leg edema (Primary)  [N50.89] Scrotal edema  [R60.1] Anasarca          ED Disposition Condition    Observation Stable                  Zack Ansari MD  04/12/25 6649       [1]   Social History  Tobacco Use    Smoking status: Never     Passive exposure: Never    Smokeless tobacco: Never   Substance Use Topics    Alcohol use: Not Currently     Alcohol/week: 0.0 standard drinks of alcohol    Drug use: No        Zack Ansari MD  04/12/25 1513

## 2025-04-13 PROBLEM — R78.81 GRAM-NEGATIVE BACTEREMIA: Status: RESOLVED | Noted: 2024-11-22 | Resolved: 2025-04-13

## 2025-04-13 PROBLEM — Z76.89 ESTABLISHING CARE WITH NEW DOCTOR, ENCOUNTER FOR: Status: RESOLVED | Noted: 2024-12-02 | Resolved: 2025-04-13

## 2025-04-13 PROBLEM — N50.89 SCROTAL EDEMA: Status: ACTIVE | Noted: 2025-04-13

## 2025-04-13 PROBLEM — R60.1 ANASARCA: Status: ACTIVE | Noted: 2025-04-13

## 2025-04-13 PROBLEM — Z71.89 GOALS OF CARE, COUNSELING/DISCUSSION: Status: RESOLVED | Noted: 2017-12-29 | Resolved: 2025-04-13

## 2025-04-13 LAB
ABSOLUTE EOSINOPHIL (OHS): 0.1 K/UL
ABSOLUTE MONOCYTE (OHS): 0.62 K/UL (ref 0.3–1)
ABSOLUTE NEUTROPHIL COUNT (OHS): 2.52 K/UL (ref 1.8–7.7)
ALBUMIN SERPL BCP-MCNC: 2 G/DL (ref 3.5–5.2)
ALP SERPL-CCNC: 81 UNIT/L (ref 40–150)
ALT SERPL W/O P-5'-P-CCNC: 11 UNIT/L (ref 10–44)
AMMONIA PLAS-SCNC: 48 UMOL/L (ref 10–50)
ANION GAP (OHS): 6 MMOL/L (ref 8–16)
ANION GAP (OHS): 9 MMOL/L (ref 8–16)
AST SERPL-CCNC: 26 UNIT/L (ref 11–45)
BASOPHILS # BLD AUTO: 0.03 K/UL
BASOPHILS NFR BLD AUTO: 0.7 %
BILIRUB SERPL-MCNC: 5 MG/DL (ref 0.1–1)
BUN SERPL-MCNC: 12 MG/DL (ref 8–23)
BUN SERPL-MCNC: 13 MG/DL (ref 8–23)
CALCIUM SERPL-MCNC: 8.2 MG/DL (ref 8.7–10.5)
CALCIUM SERPL-MCNC: 8.3 MG/DL (ref 8.7–10.5)
CHLORIDE SERPL-SCNC: 100 MMOL/L (ref 95–110)
CHLORIDE SERPL-SCNC: 99 MMOL/L (ref 95–110)
CO2 SERPL-SCNC: 26 MMOL/L (ref 23–29)
CO2 SERPL-SCNC: 27 MMOL/L (ref 23–29)
CREAT SERPL-MCNC: 1.9 MG/DL (ref 0.5–1.4)
CREAT SERPL-MCNC: 2 MG/DL (ref 0.5–1.4)
ERYTHROCYTE [DISTWIDTH] IN BLOOD BY AUTOMATED COUNT: 15.7 % (ref 11.5–14.5)
GFR SERPLBLD CREATININE-BSD FMLA CKD-EPI: 35 ML/MIN/1.73/M2
GFR SERPLBLD CREATININE-BSD FMLA CKD-EPI: 37 ML/MIN/1.73/M2
GLUCOSE SERPL-MCNC: 100 MG/DL (ref 70–110)
GLUCOSE SERPL-MCNC: 121 MG/DL (ref 70–110)
HCT VFR BLD AUTO: 27.2 % (ref 40–54)
HGB BLD-MCNC: 8.9 GM/DL (ref 14–18)
IMM GRANULOCYTES # BLD AUTO: 0.01 K/UL (ref 0–0.04)
IMM GRANULOCYTES NFR BLD AUTO: 0.2 % (ref 0–0.5)
INR PPP: 1.7 (ref 0.8–1.2)
LYMPHOCYTES # BLD AUTO: 0.86 K/UL (ref 1–4.8)
MCH RBC QN AUTO: 31.9 PG (ref 27–31)
MCHC RBC AUTO-ENTMCNC: 32.7 G/DL (ref 32–36)
MCV RBC AUTO: 98 FL (ref 82–98)
NUCLEATED RBC (/100WBC) (OHS): 0 /100 WBC
PLATELET # BLD AUTO: 78 K/UL (ref 150–450)
PMV BLD AUTO: 11.2 FL (ref 9.2–12.9)
POTASSIUM SERPL-SCNC: 3.3 MMOL/L (ref 3.5–5.1)
POTASSIUM SERPL-SCNC: 3.6 MMOL/L (ref 3.5–5.1)
PROT SERPL-MCNC: 6.5 GM/DL (ref 6–8.4)
PROTHROMBIN TIME: 18.7 SECONDS (ref 9–12.5)
RBC # BLD AUTO: 2.79 M/UL (ref 4.6–6.2)
RELATIVE EOSINOPHIL (OHS): 2.4 %
RELATIVE LYMPHOCYTE (OHS): 20.8 % (ref 18–48)
RELATIVE MONOCYTE (OHS): 15 % (ref 4–15)
RELATIVE NEUTROPHIL (OHS): 60.9 % (ref 38–73)
SODIUM SERPL-SCNC: 133 MMOL/L (ref 136–145)
SODIUM SERPL-SCNC: 134 MMOL/L (ref 136–145)
WBC # BLD AUTO: 4.14 K/UL (ref 3.9–12.7)

## 2025-04-13 PROCEDURE — 36415 COLL VENOUS BLD VENIPUNCTURE: CPT | Performed by: INTERNAL MEDICINE

## 2025-04-13 PROCEDURE — G0378 HOSPITAL OBSERVATION PER HR: HCPCS

## 2025-04-13 PROCEDURE — 25000003 PHARM REV CODE 250: Performed by: INTERNAL MEDICINE

## 2025-04-13 PROCEDURE — 82140 ASSAY OF AMMONIA: CPT | Performed by: INTERNAL MEDICINE

## 2025-04-13 PROCEDURE — 25000003 PHARM REV CODE 250: Performed by: EMERGENCY MEDICINE

## 2025-04-13 PROCEDURE — 63600175 PHARM REV CODE 636 W HCPCS: Performed by: EMERGENCY MEDICINE

## 2025-04-13 PROCEDURE — 80053 COMPREHEN METABOLIC PANEL: CPT | Performed by: EMERGENCY MEDICINE

## 2025-04-13 PROCEDURE — 36415 COLL VENOUS BLD VENIPUNCTURE: CPT | Performed by: EMERGENCY MEDICINE

## 2025-04-13 PROCEDURE — 85610 PROTHROMBIN TIME: CPT | Performed by: INTERNAL MEDICINE

## 2025-04-13 PROCEDURE — 85025 COMPLETE CBC W/AUTO DIFF WBC: CPT | Performed by: EMERGENCY MEDICINE

## 2025-04-13 RX ORDER — METOPROLOL TARTRATE 50 MG/1
50 TABLET ORAL 2 TIMES DAILY
Status: DISCONTINUED | OUTPATIENT
Start: 2025-04-13 | End: 2025-04-24 | Stop reason: HOSPADM

## 2025-04-13 RX ORDER — SPIRONOLACTONE 25 MG/1
25 TABLET ORAL ONCE
Status: COMPLETED | OUTPATIENT
Start: 2025-04-13 | End: 2025-04-13

## 2025-04-13 RX ORDER — POTASSIUM CHLORIDE 20 MEQ/1
40 TABLET, EXTENDED RELEASE ORAL ONCE
Status: COMPLETED | OUTPATIENT
Start: 2025-04-13 | End: 2025-04-13

## 2025-04-13 RX ADMIN — METOPROLOL TARTRATE 25 MG: 25 TABLET, FILM COATED ORAL at 09:04

## 2025-04-13 RX ADMIN — LACTULOSE 20 G: 20 SOLUTION ORAL at 09:04

## 2025-04-13 RX ADMIN — SPIRONOLACTONE 25 MG: 25 TABLET ORAL at 12:04

## 2025-04-13 RX ADMIN — MIDODRINE HYDROCHLORIDE 15 MG: 2.5 TABLET ORAL at 05:04

## 2025-04-13 RX ADMIN — Medication 100 MG: at 10:04

## 2025-04-13 RX ADMIN — FAMOTIDINE 20 MG: 20 TABLET, FILM COATED ORAL at 09:04

## 2025-04-13 RX ADMIN — FUROSEMIDE 10 MG/HR: 10 INJECTION, SOLUTION INTRAMUSCULAR; INTRAVENOUS at 12:04

## 2025-04-13 RX ADMIN — SPIRONOLACTONE 25 MG: 25 TABLET ORAL at 09:04

## 2025-04-13 RX ADMIN — FAMOTIDINE 20 MG: 20 TABLET, FILM COATED ORAL at 12:04

## 2025-04-13 RX ADMIN — TAMSULOSIN HYDROCHLORIDE 0.4 MG: 0.4 CAPSULE ORAL at 09:04

## 2025-04-13 RX ADMIN — MIDODRINE HYDROCHLORIDE 15 MG: 2.5 TABLET ORAL at 09:04

## 2025-04-13 RX ADMIN — POTASSIUM CHLORIDE 40 MEQ: 1500 TABLET, EXTENDED RELEASE ORAL at 12:04

## 2025-04-13 RX ADMIN — MIDODRINE HYDROCHLORIDE 15 MG: 2.5 TABLET ORAL at 12:04

## 2025-04-13 RX ADMIN — Medication 6 MG: at 09:04

## 2025-04-13 RX ADMIN — METOPROLOL TARTRATE 25 MG: 25 TABLET, FILM COATED ORAL at 12:04

## 2025-04-13 RX ADMIN — METOPROLOL TARTRATE 50 MG: 50 TABLET, FILM COATED ORAL at 09:04

## 2025-04-13 NOTE — HPI
Patient 69-year-old male history of AFib, cirrhosis, hypertension came to the ED with complaints of increased swelling to testicular region, lower extremity, patient had been seen previously for same issue, patient is on Lasix and Aldactone, increased pain due to swelling, associated orthopnea and dyspnea on exertion reported no nausea vomiting reported, patient denied any fever chills

## 2025-04-13 NOTE — ASSESSMENT & PLAN NOTE
Patient with known Cirrhosis with Child's class Calculator for Child's score link- [email or web address]-ocampo-score-cirrhosis-mortality#creator-insights. Co-morbidities are present and inclusive of ascites and anemia/pancytopenia.  MELD-Na score calculated; MELD 3.0: 28 at 2/23/2025  5:15 AM  MELD-Na: 26 at 2/23/2025  5:15 AM  Calculated from:  Serum Creatinine: 1.8 mg/dL at 2/23/2025  5:15 AM  Serum Sodium: 133 mmol/L at 2/23/2025  5:15 AM  Total Bilirubin: 3.9 mg/dL at 2/23/2025  5:15 AM  Serum Albumin: 2.1 g/dL at 2/23/2025  5:15 AM  INR(ratio): 1.9 at 2/21/2025  6:42 PM  Age at listing (hypothetical): 69 years  Sex: Male at 2/23/2025  5:15 AM      Continue chronic meds. Etiology likely ETOH. Will avoid any hepatotoxic meds, and monitor CBC/CMP/INR for synthetic function.

## 2025-04-13 NOTE — ASSESSMENT & PLAN NOTE
Patient with known Cirrhosis with Child's class Calculator for Child's score link- [email or web address]/child-ocampo-score-cirrhosis-mortality#creator-insights. Co-morbidities are present and inclusive of esophageal varices, hepatic encephalopathy, and anemia/pancytopenia.  MELD-Na score calculated; MELD 3.0: 28 at 2/23/2025  5:15 AM  MELD-Na: 26 at 2/23/2025  5:15 AM  Calculated from:  Serum Creatinine: 1.8 mg/dL at 2/23/2025  5:15 AM  Serum Sodium: 133 mmol/L at 2/23/2025  5:15 AM  Total Bilirubin: 3.9 mg/dL at 2/23/2025  5:15 AM  Serum Albumin: 2.1 g/dL at 2/23/2025  5:15 AM  INR(ratio): 1.9 at 2/21/2025  6:42 PM  Age at listing (hypothetical): 69 years  Sex: Male at 2/23/2025  5:15 AM      Continue chronic meds. Etiology likely ETOH. Will avoid any hepatotoxic meds, and monitor CBC/CMP/INR for synthetic function.

## 2025-04-13 NOTE — TELEMEDICINE CONSULT
[facility]    Cardiology  Consult Note    Patient Name: Juan Carlos Yoo Sr.  MRN: [id number]  Admission Date: 4/12/2025  Hospital Length of Stay: 0 days  Code Status: Full Code   Attending Provider: Joao Villegas III, MD   Consulting Provider: Wil Whiting MD  Primary Care Physician: Joao Villegas III, MD  Principal Problem:Decompensated cirrhosis    Patient information was obtained from patient and ER records.     Subjective:     Chief Complaint/Reason for Consult: Cardiology Consultation for Bilateral Lower Extremity and Scrotal Edema    HPI: Patient is a 68 yo male with history of chronic atrial fibrillation,  chronic diastolic heart failure,  cirrhosis, and prior need for dialysis presenting with worsening bilateral lower extremity edema and scrotal swelling. Patient reports waking up recently with increased swelling that progressively worsened, particularly affecting his genitals, causing significant discomfort with ambulation. He presented to the ED yesterday and was admitted. He reports compliance with his medications including Lasix and spironolactone, though occasionally taking doses 1-2 hours late. He endorses mild intermittent exertional dyspnea, notably while walking to his truck, but denies orthopnea. Denies fever. Patient has a dialysis port scheduled for removal this Wednesday, with pre-op clearance obtained from cardiology last Friday. Recent echocardiogram in December showed preserved cardiac function with known atrial fibrillation. EKG from the 21st confirmed chronic A-fib. Patient denies palpitations or awareness of irregular rhythm. 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 Denies history of smoking or illicit drug use. No prior cardiac stents. P    Past Surgical History:   Procedure Laterality Date    CATHETERIZATION OF BOTH LEFT AND RIGHT HEART N/A 02/08/2023    Procedure: CATHETERIZATION,  HEART, BOTH LEFT AND RIGHT;  Surgeon: Flo Malone MD;  Location: [facility] CATH LAB;  Service: Cardiology;  Laterality: N/A;  low bleeding risk 1.0%    CHOLECYSTECTOMY      COLONOSCOPY      COLONOSCOPY N/A 12/29/2017    ta rpt 2022    CORONARY ANGIOGRAPHY N/A 02/08/2023    Procedure: ANGIOGRAM, CORONARY ARTERY;  Surgeon: Flo Malone MD;  Location: [facility] CATH LAB;  Service: Cardiology;  Laterality: N/A;    HERNIA REPAIR      INCISION AND DRAINAGE Right 02/27/2025    Procedure: Incision and Drainage, thigh;  Surgeon: Kayla Foster MD;  Location: [facility] OR;  Service: General;  Laterality: Right;  Plan to go first if pt has cardiac clearance - SB    SKIN BIOPSY  03/2017    TONSILLECTOMY      UPPER GASTROINTESTINAL ENDOSCOPY  2011    EV    UPPER GASTROINTESTINAL ENDOSCOPY  2016    VASECTOMY       Review of patient's allergies indicates:  No Known Allergies  No current facility-administered medications on file prior to encounter.     Current Outpatient Medications on File Prior to Encounter   Medication Sig    famotidine (PEPCID) 20 MG tablet Take 1 tablet (20 mg total) by mouth once daily.    furosemide (LASIX) 80 MG tablet Take 1 tablet (80 mg total) by mouth 2 (two) times a day for 3 days, THEN 1 tablet (80 mg total) once daily.    lactulose (CHRONULAC) 20 gram/30 mL Soln Take 45 mLs (30 g total) by mouth 3 (three) times daily.    magnesium chloride (MAG 64) 64 mg TbEC Take 2 tablets (128 mg total) by mouth once daily.    metoprolol tartrate (LOPRESSOR) 25 MG tablet TAKE 0.5 TABLETS BY MOUTH 2 TIMES DAILY.    midodrine (PROAMATINE) 5 MG Tab Take 3 tablets (15 mg total) by mouth 3 (three) times daily with meals.    potassium bicarbonate (K-LYTE) disintegrating tablet Take 1 tablet (25 mEq total) by mouth 2 (two) times a day.    spironolactone (ALDACTONE) 25 MG tablet Take 1 tablet (25 mg total) by mouth 2 (two) times daily.    tamsulosin (FLOMAX) 0.4 mg Cap Take 1 capsule (0.4 mg total) by mouth once daily.     thiamine 100 MG tablet Take 100 mg by mouth once daily.    apixaban (ELIQUIS) 5 mg Tab Take 5 mg by mouth 2 (two) times daily.     Family History       Problem Relation (Age of Onset)    Alzheimer's disease Mother    Cancer Maternal Uncle    Heart disease Maternal Uncle    Hyperlipidemia Brother    No Known Problems Father    Ovarian cancer Sister          Tobacco Use    Smoking status: Never     Passive exposure: Never    Smokeless tobacco: Never   Substance and Sexual Activity    Alcohol use: Not Currently     Alcohol/week: 0.0 standard drinks of alcohol    Drug use: No    Sexual activity: Not Currently       Review of Systems: negative except above  Objective:     Vital Signs (Most Recent):  Temp: 98 °F (36.7 °C) (04/13/25 0800)  Pulse: (!) 113 (04/13/25 1200)  Resp: 18 (04/13/25 1200)  BP: (!) 118/59 (04/13/25 1200)  SpO2: 99 % (04/13/25 1200) Vital Signs (24h Range):  Temp:  [98 °F (36.7 °C)-98.5 °F (36.9 °C)] 98 °F (36.7 °C)  Pulse:  [] 113  Resp:  [12-18] 18  SpO2:  [97 %-100 %] 99 %  BP: (105-137)/(56-88) 118/59   Weight: 136.1 kg (300 lb)  Body mass index is 39.58 kg/m².  SpO2: 99 %       Intake/Output Summary (Last 24 hours) at 4/13/2025 1313  Last data filed at 4/13/2025 0800  Gross per 24 hour   Intake 53.18 ml   Output 2125 ml   Net -2071.82 ml     Lines/Drains/Airways       Central Venous Catheter Line  Duration                  Hemodialysis Catheter 12/16/24 1152 right internal jugular 118 days              Drain  Duration                  Open Drain 02/27/25 0815 Tube - 1 Right Thigh Penrose 1/4 inch 45 days              Peripheral Intravenous Line  Duration                  Peripheral IV - Single Lumen 04/12/25 1654 20 G Right Wrist <1 day                  Significant Labs:   Chemistries:   Recent Labs   Lab 04/08/25  0950 04/12/25  1653 04/13/25  0550   * 132* 134*   K 3.9 3.8 3.3*   CL 99 99 99   CO2 28 23 26   BUN 12 13 12   CREATININE 1.8* 1.8* 1.9*   CALCIUM 8.1* 8.5* 8.2*   BILITOT  4.5* 6.2* 5.0*   ALKPHOS 78 86 81   ALT 10 12 11   AST 29 47* 26   GLUCOSE 101 112* 100   MG  --  1.6  --         CBC/Anemia Labs: Coags:    Recent Labs   Lab 04/12/25  1653 04/13/25  0550   WBC 5.26 4.14   HGB 9.4* 8.9*   HCT 28.8* 27.2*   PLT 61* 78*   MCV 95 98   RDW 15.8* 15.7*    Recent Labs   Lab 04/13/25  1242   INR 1.7*        Significant Imaging:  Imaging Results    None       EKG: Atfib    Telemetry:  afib    Physical Exam  CV: irregularly irregular rhythm  Lungs: crackles b/l  Home Medications:   Medications Ordered Prior to Encounter[1]  Current Schedule Inpatient Medications:   famotidine  20 mg Oral BID    lactulose  20 g Oral BID    metoprolol tartrate  25 mg Oral BID    midodrine  15 mg Oral TID WM    spironolactone  25 mg Oral BID    tamsulosin  0.4 mg Oral Daily    thiamine  100 mg Oral Daily     Continuous Infusions:   furosemide (LASIX) 2 mg/mL continuous infusion (non-titrating)  10 mg/hr Intravenous Continuous 5 mL/hr at 04/13/25 1216 10 mg/hr at 04/13/25 1216     Assessment:   Primary Diagnoses:  I50.23 - Acute on chronic systolic (congestive) heart failure  I48.21 - Permanent atrial fibrillation    Secondary Diagnoses:  K74.60 - Unspecified cirrhosis of liver   Acute on chronic kidney failure  -Echo 12/24: Preserved ejection fraction with atrial fibrillation     Plan:     #Acute Volume Overload/Heart Failure/Cirrhosis  Will obtain inpatient echocardiogram  Agree with current diuretic regimen with Lasix and spironolactone  Monitor daily weights and I/Os and labs  Monitor renal function and renal consult if possible.     #Atrial Fibrillation  Continue current rate/rhythm management  Pt. Rate is controlled.  Continue metoprolol.  Not on blood thinners due to increased risk of bleeding in Cirrhosis pt. Continue assessing the need and risk of bleeding.   Maintain on telemetry monitoring      #Cirrhosis  Continue alcohol cessation  Monitor liver function tests    #Disposition  Admit to hospital for  diuresis and monitoring    Thank you for your consult.     Wil Whiting MD  Cardiology  [facility]        [1]   No current facility-administered medications on file prior to encounter.     Current Outpatient Medications on File Prior to Encounter   Medication Sig Dispense Refill    famotidine (PEPCID) 20 MG tablet Take 1 tablet (20 mg total) by mouth once daily. 30 tablet 11    furosemide (LASIX) 80 MG tablet Take 1 tablet (80 mg total) by mouth 2 (two) times a day for 3 days, THEN 1 tablet (80 mg total) once daily. 93 tablet 3    lactulose (CHRONULAC) 20 gram/30 mL Soln Take 45 mLs (30 g total) by mouth 3 (three) times daily. 4050 mL 2    magnesium chloride (MAG 64) 64 mg TbEC Take 2 tablets (128 mg total) by mouth once daily. 60 tablet 5    metoprolol tartrate (LOPRESSOR) 25 MG tablet TAKE 0.5 TABLETS BY MOUTH 2 TIMES DAILY. 90 tablet 0    midodrine (PROAMATINE) 5 MG Tab Take 3 tablets (15 mg total) by mouth 3 (three) times daily with meals. 270 tablet 1    potassium bicarbonate (K-LYTE) disintegrating tablet Take 1 tablet (25 mEq total) by mouth 2 (two) times a day. 60 tablet 2    spironolactone (ALDACTONE) 25 MG tablet Take 1 tablet (25 mg total) by mouth 2 (two) times daily. 60 tablet 11    tamsulosin (FLOMAX) 0.4 mg Cap Take 1 capsule (0.4 mg total) by mouth once daily. 90 capsule 1    thiamine 100 MG tablet Take 100 mg by mouth once daily.      apixaban (ELIQUIS) 5 mg Tab Take 5 mg by mouth 2 (two) times daily.

## 2025-04-13 NOTE — ED NOTES
Bed: Exam 04  Expected date: 4/12/25  Expected time:   Means of arrival:   Comments:     Mallory Martinez NP  04/12/25 4139

## 2025-04-13 NOTE — ED NOTES
Dr. Rios put nursing communication order in for patient to take his own home night medications; patient took nightly home meds.

## 2025-04-13 NOTE — ASSESSMENT & PLAN NOTE
Patient has persistent (7 days or more) atrial fibrillation. Patient is currently in atrial fibrillation. [id number] Score: 1. The patients heart rate in the last 24 hours is as follows:  Pulse  Min: 86  Max: 115     Antiarrhythmics  metoprolol tartrate (LOPRESSOR) tablet 25 mg, 2 times daily, Oral    Anticoagulants       Plan  - Replete lytes with a goal of K>4, Mg >2  - Patient is not anticoagulated due to thrombocytopenia   - Patient's afib is currently uncontrolled. Will adjust treatment as follows: adjust lopressor dose  - cardiology consulted

## 2025-04-13 NOTE — ASSESSMENT & PLAN NOTE
Patient with known Cirrhosis with Child's class Calculator for Child's score link- [email or web address]-[email or web address]-score-cirrhosis-mortality#creator-insights. Co-morbidities are present and inclusive of ascites and anemia/pancytopenia.  MELD-Na score calculated; MELD 3.0: 28 at 4/13/2025  8:55 PM  MELD-Na: 27 at 4/13/2025  8:55 PM  Calculated from:  Serum Creatinine: 2 mg/dL at 4/13/2025  8:55 PM  Serum Sodium: 133 mmol/L at 4/13/2025  8:55 PM  Total Bilirubin: 5 mg/dL at 4/13/2025  5:50 AM  Serum Albumin: 2 g/dL at 4/13/2025  5:50 AM  INR(ratio): 1.7 at 4/13/2025 12:42 PM  Age at listing (hypothetical): 69 years  Sex: Male at 4/13/2025  8:55 PM      Continue chronic meds. Etiology likely ETOH. Will avoid any hepatotoxic meds, and monitor CBC/CMP/INR for synthetic function.   Patient reported

## 2025-04-13 NOTE — SUBJECTIVE & OBJECTIVE
"Past Medical History:   Diagnosis Date    Atrial fibrillation     Bulging disc     Cirrhosis     Colon polyp 12/29/2017    Rpt 5 yrs    Encounter for blood transfusion     EV (esophageal varices)     Hypertension 03/30/2023    Renal disorder     Skin cancer 03/2017    'NOSE"    Thrombocytopenia        Past Surgical History:   Procedure Laterality Date    CATHETERIZATION OF BOTH LEFT AND RIGHT HEART N/A 02/08/2023    Procedure: CATHETERIZATION, HEART, BOTH LEFT AND RIGHT;  Surgeon: Flo Malone MD;  Location: [facility] CATH LAB;  Service: Cardiology;  Laterality: N/A;  low bleeding risk 1.0%    CHOLECYSTECTOMY      COLONOSCOPY      COLONOSCOPY N/A 12/29/2017    ta rpt 2022    CORONARY ANGIOGRAPHY N/A 02/08/2023    Procedure: ANGIOGRAM, CORONARY ARTERY;  Surgeon: Flo Malone MD;  Location: [facility] CATH LAB;  Service: Cardiology;  Laterality: N/A;    HERNIA REPAIR      INCISION AND DRAINAGE Right 02/27/2025    Procedure: Incision and Drainage, thigh;  Surgeon: Kayla Foster MD;  Location: [facility];  Service: General;  Laterality: Right;  Plan to go first if pt has cardiac clearance - SB    SKIN BIOPSY  03/2017    TONSILLECTOMY      UPPER GASTROINTESTINAL ENDOSCOPY  2011    EV    UPPER GASTROINTESTINAL ENDOSCOPY  2016    VASECTOMY         Review of patient's allergies indicates:  No Known Allergies    No current facility-administered medications on file prior to encounter.     Current Outpatient Medications on File Prior to Encounter   Medication Sig    famotidine (PEPCID) 20 MG tablet Take 1 tablet (20 mg total) by mouth once daily.    furosemide (LASIX) 80 MG tablet Take 1 tablet (80 mg total) by mouth 2 (two) times a day for 3 days, THEN 1 tablet (80 mg total) once daily.    lactulose (CHRONULAC) 20 gram/30 mL Soln Take 45 mLs (30 g total) by mouth 3 (three) times daily.    magnesium chloride (MAG 64) 64 mg TbEC Take 2 tablets (128 mg total) by mouth once daily.    metoprolol tartrate (LOPRESSOR) 25 MG tablet " TAKE 0.5 TABLETS BY MOUTH 2 TIMES DAILY.    midodrine (PROAMATINE) 5 MG Tab Take 3 tablets (15 mg total) by mouth 3 (three) times daily with meals.    potassium bicarbonate (K-LYTE) disintegrating tablet Take 1 tablet (25 mEq total) by mouth 2 (two) times a day.    spironolactone (ALDACTONE) 25 MG tablet Take 1 tablet (25 mg total) by mouth 2 (two) times daily.    tamsulosin (FLOMAX) 0.4 mg Cap Take 1 capsule (0.4 mg total) by mouth once daily.    thiamine 100 MG tablet Take 100 mg by mouth once daily.    apixaban (ELIQUIS) 5 mg Tab Take 5 mg by mouth 2 (two) times daily.     Family History       Problem Relation (Age of Onset)    Alzheimer's disease Mother    Cancer Maternal Uncle    Heart disease Maternal Uncle    Hyperlipidemia Brother    No Known Problems Father    Ovarian cancer Sister          Tobacco Use    Smoking status: Never     Passive exposure: Never    Smokeless tobacco: Never   Substance and Sexual Activity    Alcohol use: Not Currently     Alcohol/week: 0.0 standard drinks of alcohol    Drug use: No    Sexual activity: Not Currently     Review of Systems   Constitutional:  Negative for chills, fatigue and fever.   HENT:  Negative for congestion.    Eyes:  Negative for visual disturbance.   Respiratory:  Positive for shortness of breath. Negative for wheezing.    Cardiovascular:  Positive for leg swelling. Negative for chest pain and palpitations.   Gastrointestinal:  Positive for abdominal distention. Negative for abdominal pain, constipation, diarrhea, nausea and vomiting.   Endocrine: Negative for polyuria.   Genitourinary:  Positive for scrotal swelling (Scrotal edema). Negative for difficulty urinating and dysuria.   Musculoskeletal:  Positive for arthralgias. Negative for back pain and gait problem.   Skin:  Negative for wound.   Neurological:  Negative for weakness.   Psychiatric/Behavioral:  The patient is not nervous/anxious.      Objective:     Vital Signs (Most Recent):  Temp: 98 °F (36.7  °C) (04/13/25 0800)  Pulse: (!) 113 (04/13/25 1200)  Resp: 18 (04/13/25 1200)  BP: (!) 118/59 (04/13/25 1200)  SpO2: 99 % (04/13/25 1200) Vital Signs (24h Range):  Temp:  [98 °F (36.7 °C)-98.5 °F (36.9 °C)] 98 °F (36.7 °C)  Pulse:  [] 113  Resp:  [12-18] 18  SpO2:  [97 %-100 %] 99 %  BP: (105-137)/(56-88) 118/59     Weight: 136.1 kg (300 lb)  Body mass index is 39.58 kg/m².     Physical Exam  Vitals and nursing note reviewed.   Constitutional:       General: He is not in acute distress.     Appearance: He is well-developed. He is not diaphoretic.   HENT:      Head: Normocephalic and atraumatic.      Nose: No congestion or rhinorrhea.   Eyes:      General: No scleral icterus.        Right eye: No discharge.         Left eye: No discharge.      Extraocular Movements: Extraocular movements intact.   Neck:      Thyroid: No thyromegaly.      Vascular: No JVD.   Cardiovascular:      Rate and Rhythm: Tachycardia present.   Pulmonary:      Effort: No respiratory distress.      Breath sounds: No wheezing, rhonchi or rales.   Abdominal:      General: There is distension.   Genitourinary:     Comments: Scrotal edema  Musculoskeletal:      Right lower leg: Edema present.      Left lower leg: Edema present.   Neurological:      Mental Status: He is alert and oriented to person, place, and time. Mental status is at baseline.   Psychiatric:         Behavior: Behavior normal.         Thought Content: Thought content normal.                Significant Labs: All pertinent labs within the past 24 hours have been reviewed.  Recent Lab Results         04/13/25  0550   04/12/25  1653        Acanthocytes   Present       Albumin 2.0   2.2       ALP 81   86       ALT 11   12       Anion Gap 9   10       AST 26   47       Baso # 0.03   0.05       Basophil % 0.7   1.0       BILIRUBIN TOTAL 5.0  Comment: For infants and newborns, interpretation of results should be based   on gestational age, weight and in agreement with clinical    observations.    Premature Infant recommended reference ranges:   0-24 hours:  <8.0 mg/dL   24-48 hours: <12.0 mg/dL   3-5 days:    <15.0 mg/dL   6-29 days:   <15.0 mg/dL   6.2  Comment: For infants and newborns, interpretation of results should be based   on gestational age, weight and in agreement with clinical   observations.    Premature Infant recommended reference ranges:   0-24 hours:  <8.0 mg/dL   24-48 hours: <12.0 mg/dL   3-5 days:    <15.0 mg/dL   6-29 days:   <15.0 mg/dL       BUN 12   13       Calcium 8.2   8.5       Chloride 99   99       CO2 26   23       Creatinine 1.9   1.8       eGFR 37  Comment: Estimated GFR calculated using the CKD-EPI creatinine (2021) equation.   40  Comment: Estimated GFR calculated using the CKD-EPI creatinine (2021) equation.       Eos # 0.10   0.06       Eos % 2.4   1.1       Glucose 100   112       Gran # (ANC) 2.52   3.71       Hematocrit 27.2   28.8       Hemoglobin 8.9   9.4       Immature Grans (Abs) 0.01  Comment: Mild elevation in immature granulocytes is non specific and can be seen in a variety of conditions including stress response, acute inflammation, trauma and pregnancy. Correlation with other laboratory and clinical findings is essential.   0.03  Comment: Mild elevation in immature granulocytes is non specific and can be seen in a variety of conditions including stress response, acute inflammation, trauma and pregnancy. Correlation with other laboratory and clinical findings is essential.       Immature Granulocytes 0.2   0.6       Lymph # 0.86   0.73       Lymph % 20.8   13.9       Magnesium    1.6       MCH 31.9   31.1       MCHC 32.7   32.6       MCV 98   95       Mono # 0.62   0.68       Mono % 15.0   12.9       MPV 11.2   12.9       Neut % 60.9   70.5       nRBC 0   0       NT-proBNP   1,603       Platelet Estimate   Clumped       Platelet Count 78   61       Potassium 3.3   3.8       PROTEIN TOTAL 6.5   7.1       RBC 2.79   3.02       RDW 15.7    15.8       Sodium 134   132       WBC 4.14   5.26               Significant Imaging: I have reviewed all pertinent imaging results/findings within the past 24 hours.

## 2025-04-13 NOTE — PLAN OF CARE
Care Plan reviewed with patient.    Problem: Adult Inpatient Plan of Care  Goal: Plan of Care Review  Outcome: Progressing  Goal: Patient-Specific Goal (Individualized)  Outcome: Progressing  Goal: Absence of Hospital-Acquired Illness or Injury  Outcome: Progressing  Goal: Optimal Comfort and Wellbeing  Outcome: Progressing  Goal: Readiness for Transition of Care  Outcome: Progressing

## 2025-04-13 NOTE — H&P
"  [facility] Medicine  History & Physical    Patient Name: Juan Carlos Yoo Sr.  MRN: [id number]  Patient Class: OP- Observation  Admission Date: 4/12/2025  Attending Physician: Joao Villegas III, MD   Primary Care Provider: Joao Villegas III, MD         Patient information was obtained from patient, past medical records, and ER records.     Subjective:     Principal Problem:Decompensated cirrhosis    Chief Complaint:   Chief Complaint   Patient presents with    Groin Swelling     Pt reports increased swelling to the testicular region as well as swelling in the lower extremities. Pt believes his testicle region may be infected due to using a rag that he uses to clean his rectum, he also uses to clean his testicle region/penis. Pt was seen "roughly one month ago for the same thing." Pt also reports "a little more shortness of breath" than normal."        HPI: Patient 69-year-old male history of AFib, cirrhosis, hypertension came to the ED with complaints of increased swelling to testicular region, lower extremity, patient had been seen previously for same issue, patient is on Lasix and Aldactone, increased pain due to swelling, associated orthopnea and dyspnea on exertion reported no nausea vomiting reported, patient denied any fever chills    Past Medical History:   Diagnosis Date    Atrial fibrillation     Bulging disc     Cirrhosis     Colon polyp 12/29/2017    Rpt 5 yrs    Encounter for blood transfusion     EV (esophageal varices)     Hypertension 03/30/2023    Renal disorder     Skin cancer 03/2017    'NOSE"    Thrombocytopenia        Past Surgical History:   Procedure Laterality Date    CATHETERIZATION OF BOTH LEFT AND RIGHT HEART N/A 02/08/2023    Procedure: CATHETERIZATION, HEART, BOTH LEFT AND RIGHT;  Surgeon: Flo Malone MD;  Location: [facility] CATH LAB;  Service: Cardiology;  Laterality: N/A;  low bleeding risk 1.0%    CHOLECYSTECTOMY      COLONOSCOPY      COLONOSCOPY N/A " 12/29/2017    ta rpt 2022    CORONARY ANGIOGRAPHY N/A 02/08/2023    Procedure: ANGIOGRAM, CORONARY ARTERY;  Surgeon: Flo Malone MD;  Location: [facility] CATH LAB;  Service: Cardiology;  Laterality: N/A;    HERNIA REPAIR      INCISION AND DRAINAGE Right 02/27/2025    Procedure: Incision and Drainage, thigh;  Surgeon: Kayla Foster MD;  Location: [facility] OR;  Service: General;  Laterality: Right;  Plan to go first if pt has cardiac clearance - SB    SKIN BIOPSY  03/2017    TONSILLECTOMY      UPPER GASTROINTESTINAL ENDOSCOPY  2011    EV    UPPER GASTROINTESTINAL ENDOSCOPY  2016    VASECTOMY         Review of patient's allergies indicates:  No Known Allergies    No current facility-administered medications on file prior to encounter.     Current Outpatient Medications on File Prior to Encounter   Medication Sig    famotidine (PEPCID) 20 MG tablet Take 1 tablet (20 mg total) by mouth once daily.    furosemide (LASIX) 80 MG tablet Take 1 tablet (80 mg total) by mouth 2 (two) times a day for 3 days, THEN 1 tablet (80 mg total) once daily.    lactulose (CHRONULAC) 20 gram/30 mL Soln Take 45 mLs (30 g total) by mouth 3 (three) times daily.    magnesium chloride (MAG 64) 64 mg TbEC Take 2 tablets (128 mg total) by mouth once daily.    metoprolol tartrate (LOPRESSOR) 25 MG tablet TAKE 0.5 TABLETS BY MOUTH 2 TIMES DAILY.    midodrine (PROAMATINE) 5 MG Tab Take 3 tablets (15 mg total) by mouth 3 (three) times daily with meals.    potassium bicarbonate (K-LYTE) disintegrating tablet Take 1 tablet (25 mEq total) by mouth 2 (two) times a day.    spironolactone (ALDACTONE) 25 MG tablet Take 1 tablet (25 mg total) by mouth 2 (two) times daily.    tamsulosin (FLOMAX) 0.4 mg Cap Take 1 capsule (0.4 mg total) by mouth once daily.    thiamine 100 MG tablet Take 100 mg by mouth once daily.    apixaban (ELIQUIS) 5 mg Tab Take 5 mg by mouth 2 (two) times daily.     Family History       Problem Relation (Age of Onset)    Alzheimer's  disease Mother    Cancer Maternal Uncle    Heart disease Maternal Uncle    Hyperlipidemia Brother    No Known Problems Father    Ovarian cancer Sister          Tobacco Use    Smoking status: Never     Passive exposure: Never    Smokeless tobacco: Never   Substance and Sexual Activity    Alcohol use: Not Currently     Alcohol/week: 0.0 standard drinks of alcohol    Drug use: No    Sexual activity: Not Currently     Review of Systems   Constitutional:  Negative for chills, fatigue and fever.   HENT:  Negative for congestion.    Eyes:  Negative for visual disturbance.   Respiratory:  Positive for shortness of breath. Negative for wheezing.    Cardiovascular:  Positive for leg swelling. Negative for chest pain and palpitations.   Gastrointestinal:  Positive for abdominal distention. Negative for abdominal pain, constipation, diarrhea, nausea and vomiting.   Endocrine: Negative for polyuria.   Genitourinary:  Positive for scrotal swelling (Scrotal edema). Negative for difficulty urinating and dysuria.   Musculoskeletal:  Positive for arthralgias. Negative for back pain and gait problem.   Skin:  Negative for wound.   Neurological:  Negative for weakness.   Psychiatric/Behavioral:  The patient is not nervous/anxious.      Objective:     Vital Signs (Most Recent):  Temp: 98 °F (36.7 °C) (04/13/25 0800)  Pulse: (!) 113 (04/13/25 1200)  Resp: 18 (04/13/25 1200)  BP: (!) 118/59 (04/13/25 1200)  SpO2: 99 % (04/13/25 1200) Vital Signs (24h Range):  Temp:  [98 °F (36.7 °C)-98.5 °F (36.9 °C)] 98 °F (36.7 °C)  Pulse:  [] 113  Resp:  [12-18] 18  SpO2:  [97 %-100 %] 99 %  BP: (105-137)/(56-88) 118/59     Weight: 136.1 kg (300 lb)  Body mass index is 39.58 kg/m².     Physical Exam  Vitals and nursing note reviewed.   Constitutional:       General: He is not in acute distress.     Appearance: He is well-developed. He is not diaphoretic.   HENT:      Head: Normocephalic and atraumatic.      Nose: No congestion or rhinorrhea.    Eyes:      General: No scleral icterus.        Right eye: No discharge.         Left eye: No discharge.      Extraocular Movements: Extraocular movements intact.   Neck:      Thyroid: No thyromegaly.      Vascular: No JVD.   Cardiovascular:      Rate and Rhythm: Tachycardia present.   Pulmonary:      Effort: No respiratory distress.      Breath sounds: No wheezing, rhonchi or rales.   Abdominal:      General: There is distension.   Genitourinary:     Comments: Scrotal edema  Musculoskeletal:      Right lower leg: Edema present.      Left lower leg: Edema present.   Neurological:      Mental Status: He is alert and oriented to person, place, and time. Mental status is at baseline.   Psychiatric:         Behavior: Behavior normal.         Thought Content: Thought content normal.                Significant Labs: All pertinent labs within the past 24 hours have been reviewed.  Recent Lab Results         04/13/25  0550   04/12/25  1653        Acanthocytes   Present       Albumin 2.0   2.2       ALP 81   86       ALT 11   12       Anion Gap 9   10       AST 26   47       Baso # 0.03   0.05       Basophil % 0.7   1.0       BILIRUBIN TOTAL 5.0  Comment: For infants and newborns, interpretation of results should be based   on gestational age, weight and in agreement with clinical   observations.    Premature Infant recommended reference ranges:   0-24 hours:  <8.0 mg/dL   24-48 hours: <12.0 mg/dL   3-5 days:    <15.0 mg/dL   6-29 days:   <15.0 mg/dL   6.2  Comment: For infants and newborns, interpretation of results should be based   on gestational age, weight and in agreement with clinical   observations.    Premature Infant recommended reference ranges:   0-24 hours:  <8.0 mg/dL   24-48 hours: <12.0 mg/dL   3-5 days:    <15.0 mg/dL   6-29 days:   <15.0 mg/dL       BUN 12   13       Calcium 8.2   8.5       Chloride 99   99       CO2 26   23       Creatinine 1.9   1.8       eGFR 37  Comment: Estimated GFR calculated using  the CKD-EPI creatinine (2021) equation.   40  Comment: Estimated GFR calculated using the CKD-EPI creatinine (2021) equation.       Eos # 0.10   0.06       Eos % 2.4   1.1       Glucose 100   112       Gran # (ANC) 2.52   3.71       Hematocrit 27.2   28.8       Hemoglobin 8.9   9.4       Immature Grans (Abs) 0.01  Comment: Mild elevation in immature granulocytes is non specific and can be seen in a variety of conditions including stress response, acute inflammation, trauma and pregnancy. Correlation with other laboratory and clinical findings is essential.   0.03  Comment: Mild elevation in immature granulocytes is non specific and can be seen in a variety of conditions including stress response, acute inflammation, trauma and pregnancy. Correlation with other laboratory and clinical findings is essential.       Immature Granulocytes 0.2   0.6       Lymph # 0.86   0.73       Lymph % 20.8   13.9       Magnesium    1.6       MCH 31.9   31.1       MCHC 32.7   32.6       MCV 98   95       Mono # 0.62   0.68       Mono % 15.0   12.9       MPV 11.2   12.9       Neut % 60.9   70.5       nRBC 0   0       NT-proBNP   1,603       Platelet Estimate   Clumped       Platelet Count 78   61       Potassium 3.3   3.8       PROTEIN TOTAL 6.5   7.1       RBC 2.79   3.02       RDW 15.7   15.8       Sodium 134   132       WBC 4.14   5.26               Significant Imaging: I have reviewed all pertinent imaging results/findings within the past 24 hours.  Assessment/Plan:     Assessment & Plan  Decompensated cirrhosis  Patient with known Cirrhosis with Child's class Calculator for Child's score link- https://www.mdcalc.com/calc/340/child-ocampo-score-cirrhosis-mortality#creator-insights. Co-morbidities are present and inclusive of esophageal varices, hepatic encephalopathy, and anemia/pancytopenia.  MELD-Na score calculated; MELD 3.0: 28 at 2/23/2025  5:15 AM  MELD-Na: 26 at 2/23/2025  5:15 AM  Calculated from:  Serum Creatinine: 1.8 mg/dL  at 2/23/2025  5:15 AM  Serum Sodium: 133 mmol/L at 2/23/2025  5:15 AM  Total Bilirubin: 3.9 mg/dL at 2/23/2025  5:15 AM  Serum Albumin: 2.1 g/dL at 2/23/2025  5:15 AM  INR(ratio): 1.9 at 2/21/2025  6:42 PM  Age at listing (hypothetical): 69 years  Sex: Male at 2/23/2025  5:15 AM      Continue chronic meds. Etiology likely ETOH. Will avoid any hepatotoxic meds, and monitor CBC/CMP/INR for synthetic function.   Cirrhosis  Patient with known Cirrhosis with Child's class Calculator for Child's score link- https://www.Ethonova.Funanga/calc/340/child-ocampo-score-cirrhosis-mortality#creator-insights. Co-morbidities are present and inclusive of ascites and anemia/pancytopenia.  MELD-Na score calculated; MELD 3.0: 28 at 2/23/2025  5:15 AM  MELD-Na: 26 at 2/23/2025  5:15 AM  Calculated from:  Serum Creatinine: 1.8 mg/dL at 2/23/2025  5:15 AM  Serum Sodium: 133 mmol/L at 2/23/2025  5:15 AM  Total Bilirubin: 3.9 mg/dL at 2/23/2025  5:15 AM  Serum Albumin: 2.1 g/dL at 2/23/2025  5:15 AM  INR(ratio): 1.9 at 2/21/2025  6:42 PM  Age at listing (hypothetical): 69 years  Sex: Male at 2/23/2025  5:15 AM      Continue chronic meds. Etiology likely ETOH. Will avoid any hepatotoxic meds, and monitor CBC/CMP/INR for synthetic function.   Longstanding persistent atrial fibrillation  Patient has persistent (7 days or more) atrial fibrillation. Patient is currently in atrial fibrillation. [id number] Score: 1. The patients heart rate in the last 24 hours is as follows:  Pulse  Min: 86  Max: 115     Antiarrhythmics  metoprolol tartrate (LOPRESSOR) tablet 25 mg, 2 times daily, Oral    Anticoagulants       Plan  - Replete lytes with a goal of K>4, Mg >2  - Patient is not anticoagulated due to thrombocytopenia   - Patient's afib is currently uncontrolled. Will adjust treatment as follows: adjust lopressor dose  - cardiology consulted      Thrombocytopenia  The likely etiology of thrombocytopenia is liver disease. The patients 3 most recent labs are  listed below.  Recent Labs     04/12/25  1653 04/13/25  0550   PLT 61* 78*     Plan  - Will transfuse if platelet count is <20k.    Scrotal edema      Anasarca  2/2 to cirrhosis, diuresis on board, replete electrolytes, monitor urine output    VTE Risk Mitigation (From admission, onward)           Ordered     IP VTE HIGH RISK PATIENT  Once         04/12/25 2327     Place sequential compression device  Until discontinued         04/12/25 2327     Place ANDREW hose  Until discontinued         04/12/25 2327                       On 04/13/2025, patient should be placed in hospital observation services under my care.             Satnam King MD  Department of Hospital Medicine  [place] - Emergency Department

## 2025-04-13 NOTE — ASSESSMENT & PLAN NOTE
The likely etiology of thrombocytopenia is liver disease. The patients 3 most recent labs are listed below.  Recent Labs     04/12/25  1653 04/13/25  0550   PLT 61* 78*     Plan  - Will transfuse if platelet count is <20k.

## 2025-04-14 ENCOUNTER — CLINICAL SUPPORT (OUTPATIENT)
Dept: CARDIOLOGY | Facility: HOSPITAL | Age: 70
End: 2025-04-14
Attending: EMERGENCY MEDICINE
Payer: COMMERCIAL

## 2025-04-14 LAB
AORTIC ROOT ANNULUS: 3.39 CM
AORTIC VALVE CUSP SEPERATION: 1.15 CM
AV INDEX (PROSTH): 0.64
AV MEAN GRADIENT: 4 MMHG
AV PEAK GRADIENT: 7 MMHG
AV VALVE AREA BY VELOCITY RATIO: 2.1 CM²
AV VALVE AREA: 2.2 CM²
AV VELOCITY RATIO: 0.62
BSA FOR ECHO PROCEDURE: 2.65 M2
CV ECHO LV RWT: 0.34 CM
DOP CALC AO PEAK VEL: 1.3 M/S
DOP CALC AO VTI: 27.5 CM
DOP CALC LVOT AREA: 3.5 CM2
DOP CALC LVOT DIAMETER: 2.1 CM
DOP CALC LVOT PEAK VEL: 0.8 M/S
DOP CALC LVOT STROKE VOLUME: 60.9 CM3
DOP CALC RVOT PEAK VEL: 0.58 M/S
DOP CALCLVOT PEAK VEL VTI: 17.6 CM
E WAVE DECELERATION TIME: 208 MSEC
E/E' RATIO: 12 M/S
ECHO LV POSTERIOR WALL: 0.9 CM (ref 0.6–1.1)
FRACTIONAL SHORTENING: 30.2 % (ref 28–44)
INTERVENTRICULAR SEPTUM: 1.1 CM (ref 0.6–1.1)
IVC DIAMETER: 3.29 CM
LA MAJOR: 7.3 CM
LEFT ATRIUM SIZE: 6 CM
LEFT INTERNAL DIMENSION IN SYSTOLE: 3.7 CM (ref 2.1–4)
LEFT VENTRICLE DIASTOLIC VOLUME INDEX: 52.73 ML/M2
LEFT VENTRICLE DIASTOLIC VOLUME: 135 ML
LEFT VENTRICLE MASS INDEX: 78.3 G/M2
LEFT VENTRICLE SYSTOLIC VOLUME INDEX: 22.7 ML/M2
LEFT VENTRICLE SYSTOLIC VOLUME: 58 ML
LEFT VENTRICULAR INTERNAL DIMENSION IN DIASTOLE: 5.3 CM (ref 3.5–6)
LEFT VENTRICULAR MASS: 200.4 G
LV LATERAL E/E' RATIO: 12.9 M/S
LV SEPTAL E/E' RATIO: 10.9 M/S
LVED V (TEICH): 134.91 ML
LVES V (TEICH): 57.57 ML
LVOT MG: 1.46 MMHG
LVOT MV: 0.57 CM/S
MV PEAK E VEL: 1.42 M/S
MV STENOSIS PRESSURE HALF TIME: 60.45 MS
MV VALVE AREA P 1/2 METHOD: 3.64 CM2
OHS CV RV/LV RATIO: 0.68 CM
PISA MRMAX VEL: 4.58 M/S
PISA TR MAX VEL: 3.2 M/S
RA MAJOR: 6.5 CM
RA PRESSURE ESTIMATED: 15 MMHG
RIGHT VENTRICLE DIASTOLIC BASEL DIMENSION: 3.6 CM
RIGHT VENTRICULAR END-DIASTOLIC DIMENSION: 3.6 CM
RV TB RVSP: 18 MMHG
TDI LATERAL: 0.11 M/S
TDI SEPTAL: 0.13 M/S
TDI: 0.12 M/S
TR MAX PG: 41 MMHG
TRICUSPID ANNULAR PLANE SYSTOLIC EXCURSION: 1.8 CM
TV REST PULMONARY ARTERY PRESSURE: 56 MMHG
Z-SCORE OF LEFT VENTRICULAR DIMENSION IN END DIASTOLE: -11.3
Z-SCORE OF LEFT VENTRICULAR DIMENSION IN END SYSTOLE: -7.6

## 2025-04-14 PROCEDURE — 25000003 PHARM REV CODE 250: Performed by: EMERGENCY MEDICINE

## 2025-04-14 PROCEDURE — 25000003 PHARM REV CODE 250: Performed by: INTERNAL MEDICINE

## 2025-04-14 PROCEDURE — 21400001 HC TELEMETRY ROOM

## 2025-04-14 PROCEDURE — 97165 OT EVAL LOW COMPLEX 30 MIN: CPT

## 2025-04-14 PROCEDURE — 63600175 PHARM REV CODE 636 W HCPCS: Performed by: EMERGENCY MEDICINE

## 2025-04-14 PROCEDURE — 93306 TTE W/DOPPLER COMPLETE: CPT

## 2025-04-14 RX ORDER — MUPIROCIN 20 MG/G
OINTMENT TOPICAL 2 TIMES DAILY
Status: DISPENSED | OUTPATIENT
Start: 2025-04-14 | End: 2025-04-19

## 2025-04-14 RX ORDER — SPIRONOLACTONE 25 MG/1
25 TABLET ORAL 2 TIMES DAILY
Status: DISCONTINUED | OUTPATIENT
Start: 2025-04-14 | End: 2025-04-19

## 2025-04-14 RX ADMIN — FAMOTIDINE 20 MG: 20 TABLET, FILM COATED ORAL at 08:04

## 2025-04-14 RX ADMIN — LACTULOSE 20 G: 20 SOLUTION ORAL at 09:04

## 2025-04-14 RX ADMIN — MUPIROCIN: 20 OINTMENT TOPICAL at 09:04

## 2025-04-14 RX ADMIN — SPIRONOLACTONE 25 MG: 25 TABLET ORAL at 09:04

## 2025-04-14 RX ADMIN — TAMSULOSIN HYDROCHLORIDE 0.4 MG: 0.4 CAPSULE ORAL at 08:04

## 2025-04-14 RX ADMIN — FUROSEMIDE 10 MG/HR: 10 INJECTION, SOLUTION INTRAMUSCULAR; INTRAVENOUS at 12:04

## 2025-04-14 RX ADMIN — Medication 100 MG: at 08:04

## 2025-04-14 RX ADMIN — SPIRONOLACTONE 25 MG: 25 TABLET ORAL at 08:04

## 2025-04-14 RX ADMIN — FAMOTIDINE 20 MG: 20 TABLET, FILM COATED ORAL at 09:04

## 2025-04-14 RX ADMIN — METOPROLOL TARTRATE 50 MG: 50 TABLET, FILM COATED ORAL at 08:04

## 2025-04-14 NOTE — PLAN OF CARE
Plan of care reviewed with patient. Peripheral IV in place and infusing Lasix @ 5 ml/hr. Pt tolerated meds well. VSS. NADN. Pt able to ambulate with standby assist to bathroom for voiding and BM. Pain and swelling to scrotum. Barrier cream applied to area. Pt free from falls and injury. Questions and concerns addressed. Pt belongings and call bell within reach.   Problem: Adult Inpatient Plan of Care  Goal: Plan of Care Review  Outcome: Progressing  Goal: Patient-Specific Goal (Individualized)  Outcome: Progressing  Goal: Absence of Hospital-Acquired Illness or Injury  Outcome: Progressing  Goal: Optimal Comfort and Wellbeing  Outcome: Progressing  Goal: Readiness for Transition of Care  Outcome: Progressing     Problem: Acute Kidney Injury/Impairment  Goal: Fluid and Electrolyte Balance  Outcome: Progressing  Goal: Improved Oral Intake  Outcome: Progressing  Goal: Effective Renal Function  Outcome: Progressing     Problem: Infection  Goal: Absence of Infection Signs and Symptoms  Outcome: Progressing     Problem: Wound  Goal: Optimal Coping  Outcome: Progressing  Goal: Optimal Functional Ability  Outcome: Progressing  Goal: Absence of Infection Signs and Symptoms  Outcome: Progressing  Goal: Improved Oral Intake  Outcome: Progressing  Goal: Optimal Pain Control and Function  Outcome: Progressing  Goal: Skin Health and Integrity  Outcome: Progressing  Goal: Optimal Wound Healing  Outcome: Progressing     Problem: Skin Injury Risk Increased  Goal: Skin Health and Integrity  Outcome: Progressing

## 2025-04-14 NOTE — PT/OT/SLP PROGRESS
"Physical Therapy      Patient Name:  Juan Carlos Yoo Sr.   MRN:  [id number]    Patient not seen this morning secondary to patient is  unwilling to participate in therapy despite encouragement.  Patient reports that he just got back in bed and he has been getting up to go to the bathroom and his "balls" hurt. . Explained the reason for P.T. evaluation, he expressed understanding. Charge nurse Jcarlos Nagy RN informed. , . Will follow-up as appropriate. .    "

## 2025-04-14 NOTE — ASSESSMENT & PLAN NOTE
Creatine stable for now. BMP reviewed- noted Estimated Creatinine Clearance: 49.1 mL/min (A) (based on SCr of 2 mg/dL (H)). according to latest data. Based on current GFR, CKD stage is stage 4 - GFR 15-29.  Monitor UOP and serial BMP and adjust therapy as needed. Renally dose meds. Avoid nephrotoxic medications and procedures.    4/14 FM:  Will not DC tunneled cath just yet.

## 2025-04-14 NOTE — SUBJECTIVE & OBJECTIVE
"Past Medical History:   Diagnosis Date    Atrial fibrillation     Bulging disc     Cirrhosis     Colon polyp 12/29/2017    Rpt 5 yrs    Encounter for blood transfusion     EV (esophageal varices)     Hypertension 03/30/2023    Renal disorder     Skin cancer 03/2017    'NOSE"    Thrombocytopenia        Past Surgical History:   Procedure Laterality Date    CATHETERIZATION OF BOTH LEFT AND RIGHT HEART N/A 02/08/2023    Procedure: CATHETERIZATION, HEART, BOTH LEFT AND RIGHT;  Surgeon: Flo Malone MD;  Location: [facility] CATH LAB;  Service: Cardiology;  Laterality: N/A;  low bleeding risk 1.0%    CHOLECYSTECTOMY      COLONOSCOPY      COLONOSCOPY N/A 12/29/2017    ta rpt 2022    CORONARY ANGIOGRAPHY N/A 02/08/2023    Procedure: ANGIOGRAM, CORONARY ARTERY;  Surgeon: Flo Malone MD;  Location: [facility] CATH LAB;  Service: Cardiology;  Laterality: N/A;    HERNIA REPAIR      INCISION AND DRAINAGE Right 02/27/2025    Procedure: Incision and Drainage, thigh;  Surgeon: Kayla Foster MD;  Location: [facility];  Service: General;  Laterality: Right;  Plan to go first if pt has cardiac clearance - SB    SKIN BIOPSY  03/2017    TONSILLECTOMY      UPPER GASTROINTESTINAL ENDOSCOPY  2011    EV    UPPER GASTROINTESTINAL ENDOSCOPY  2016    VASECTOMY         Review of patient's allergies indicates:  No Known Allergies    No current facility-administered medications on file prior to encounter.     Current Outpatient Medications on File Prior to Encounter   Medication Sig    famotidine (PEPCID) 20 MG tablet Take 1 tablet (20 mg total) by mouth once daily.    furosemide (LASIX) 80 MG tablet Take 1 tablet (80 mg total) by mouth 2 (two) times a day for 3 days, THEN 1 tablet (80 mg total) once daily.    lactulose (CHRONULAC) 20 gram/30 mL Soln Take 45 mLs (30 g total) by mouth 3 (three) times daily.    magnesium chloride (MAG 64) 64 mg TbEC Take 2 tablets (128 mg total) by mouth once daily.    metoprolol tartrate (LOPRESSOR) 25 MG tablet " TAKE 0.5 TABLETS BY MOUTH 2 TIMES DAILY.    midodrine (PROAMATINE) 5 MG Tab Take 3 tablets (15 mg total) by mouth 3 (three) times daily with meals.    potassium bicarbonate (K-LYTE) disintegrating tablet Take 1 tablet (25 mEq total) by mouth 2 (two) times a day.    spironolactone (ALDACTONE) 25 MG tablet Take 1 tablet (25 mg total) by mouth 2 (two) times daily.    tamsulosin (FLOMAX) 0.4 mg Cap Take 1 capsule (0.4 mg total) by mouth once daily.    thiamine 100 MG tablet Take 100 mg by mouth once daily.    apixaban (ELIQUIS) 5 mg Tab Take 5 mg by mouth 2 (two) times daily.     Family History       Problem Relation (Age of Onset)    Alzheimer's disease Mother    Cancer Maternal Uncle    Heart disease Maternal Uncle    Hyperlipidemia Brother    No Known Problems Father    Ovarian cancer Sister          Tobacco Use    Smoking status: Never     Passive exposure: Never    Smokeless tobacco: Never   Substance and Sexual Activity    Alcohol use: Not Currently     Alcohol/week: 0.0 standard drinks of alcohol    Drug use: No    Sexual activity: Not Currently     Review of Systems   Constitutional:  Positive for fatigue. Negative for chills and fever.   HENT:  Negative for congestion.    Eyes:  Negative for visual disturbance.   Respiratory:  Positive for shortness of breath. Negative for wheezing.    Cardiovascular:  Positive for leg swelling. Negative for chest pain and palpitations.   Gastrointestinal:  Positive for abdominal distention. Negative for abdominal pain, constipation, diarrhea, nausea and vomiting.   Endocrine: Negative for polyuria.   Genitourinary:  Positive for scrotal swelling (Scrotal edema). Negative for dysuria.   Musculoskeletal:  Positive for arthralgias. Negative for back pain and gait problem.   Skin:  Negative for wound.   Neurological:  Positive for weakness.   Psychiatric/Behavioral:  The patient is not nervous/anxious.      Objective:     Vital Signs (Most Recent):  Temp: 97.7 °F (36.5 °C)  (04/14/25 0802)  Pulse: 96 (04/14/25 0802)  Resp: 18 (04/14/25 0802)  BP: (!) 107/57 (04/14/25 0802)  SpO2: 95 % (04/14/25 0802) Vital Signs (24h Range):  Temp:  [97.7 °F (36.5 °C)-98.4 °F (36.9 °C)] 97.7 °F (36.5 °C)  Pulse:  [] 96  Resp:  [18-20] 18  SpO2:  [95 %-100 %] 95 %  BP: ()/(53-65) 107/57     Weight: 136.1 kg (300 lb)  Body mass index is 39.58 kg/m².     Physical Exam  Vitals and nursing note reviewed.   Constitutional:       General: He is not in acute distress.     Appearance: He is well-developed. He is obese. He is ill-appearing. He is not diaphoretic.   HENT:      Head: Normocephalic and atraumatic.      Nose: No congestion or rhinorrhea.   Eyes:      General: Scleral icterus present.         Right eye: No discharge.         Left eye: No discharge.      Extraocular Movements: Extraocular movements intact.   Neck:      Thyroid: No thyromegaly.      Vascular: No JVD.   Cardiovascular:      Rate and Rhythm: Normal rate. Rhythm irregular.      Heart sounds: Normal heart sounds. No murmur heard.     No friction rub. No gallop.   Pulmonary:      Effort: No respiratory distress.      Breath sounds: No wheezing, rhonchi or rales.   Abdominal:      General: There is distension.      Tenderness: There is no abdominal tenderness. There is no guarding or rebound.      Hernia: No hernia is present.   Genitourinary:     Comments: Scrotal edema (massive)  Musculoskeletal:      Cervical back: Neck supple.      Right lower leg: Edema present.      Left lower leg: Edema present.   Neurological:      Mental Status: He is alert and oriented to person, place, and time. Mental status is at baseline.      Motor: Weakness present.   Psychiatric:         Behavior: Behavior normal.         Thought Content: Thought content normal.                Significant Labs: All pertinent labs within the past 24 hours have been reviewed.  Recent Lab Results         04/13/25 2055 04/13/25  1242        Ammonia   48        Anion Gap 6         BUN 13         Calcium 8.3         Chloride 100         CO2 27         Creatinine 2.0         eGFR 35  Comment: Estimated GFR calculated using the CKD-EPI creatinine (2021) equation.         Glucose 121         INR   1.7  Comment: Coumadin Therapy:    2.0 - 3.0 for INR for all indicators except mechanical heart valves    and antiphospholipid syndromes which should use 2.5 - 3.5.       Potassium 3.6         PT   18.7       Sodium 133                 Significant Imaging: I have reviewed all pertinent imaging results/findings within the past 24 hours.

## 2025-04-14 NOTE — HOSPITAL COURSE
4/14 FM:  Patient known to me, patient presented to the emergency department with an acute 6 lb weight gain severe painful scrotal edema and dyspnea.  Patient is on an IV Lasix drip with some response.  Scrotum is still massive today.  Patient is not taking anticoagulation because of his thrombocytopenia and cirrhosis cardiology has been consulted patient also had a planned surgical removal of his tunneled dialysis catheter in the right chest for this week which may be on hold depending on how he responds to treatment this admission.  I have contacted surgery.  4/15 FM:  Patient is responding to the Lasix drip and had decent urine output, patient's scrotal edema has reduced about 30%.  Patient's electrolytes noted we will begin replacing potassium and magnesium as we diurese patient's midodrine was discontinued and I have explained to him that he will likely have chronic hypotension and once we get to a adequate volume level we will see how he tolerates his blood pressures.  We will likely require a prolonged hospitalization.  4/16 FM:  Patient is diuresing slowly, patient's weight has not changed much on measurement but question accuracy.  Patient's scrotal edema is improved some but still persist.  Patient's creatinine is down on IV Lasix drip, we will consult patient's nephrologist to follow along.  Leaving Vas-Cath in place for now.  Patient's getting out of bed and working with therapy which I have continued to encourage.  Midodrine is on hold which I do suspect was contributing to volume retention.  4/17 FM:  Patient's diuresing well and has had a significant improvement in the last 24 hours.  Patient's electrolytes noted continue repletion continue hospitalization and IV Lasix drip for another 24 hours.  We will likely transition to p.o. in a.m..  Mental status seems to be improved keep working with therapy.  4/18 FM:  Patient is continuing to diurese scrotal edema almost resolved patient's still has quite a  bit of volume left to attempt remove blood pressure improved creatinine improved.  Continue to press forward with current treatment.  4/19 FM:  Patient has now been on a Lasix drip for the last 6 days, we will transitioned to pulse dosing and then p.o..  Patient has lost significant edema and fluid weight but still has a ways to go.  Can likely transition to p.o. and continue this at home.  4/20 FM:  Patient's diuresis has halted and he actually gained a 1 lb overnight.  I have reached out to the patient's nephrologist and she is adding metolazone daily and will ultrafiltrate if no progress tomorrow.  4/21 FM:  Patient had significant diuresis through the night with the addition of metolazone, patient's -3800 mL.  Patient's edema improved his weight is down we will continue with current medications renal assisting and following.  4/22 FM:  Patient diuresed another 3100 mL overnight.  Significant difference in exam today.  Continue current care plan and watch electrolytes and renal function.  4/23 FM:  Out 9L last 24 hrs, great response to txt, decrease lasix to BID today, home in next couple of days.    Discharge Note:  Patient has had a significant diuresis over the last 3-4 days with the addition of high-dose metolazone.  Patient is now out about 11 L is scrotal edema has resolved and he is feeling better.  Patient's mobile he is getting up he is able to transport to the restroom and the toilet and able to care for himself at home.  We have re-educated the patient on current dosing of diuretics we would like him to record daily weights and vital signs please keep logs and we are attempting to get him to a dry weight closer to 250-255 lb.  Patient's still has significant edema and a ways to go.  We have decided to leave the patient's Vas-Cath in place for now as he may need it as his renal function has been labile as well as his urine output has varied significantly with medications and his cirrhosis.  Patient's  encephalopathy is improved with current treatment we will continue in the home setting we will refer back to home health with PT OT nurses aides  and skilled nurse to assist him manage his chronic illnesses and to remain in the home setting.

## 2025-04-14 NOTE — PT/OT/SLP EVAL
Occupational Therapy   Evaluation and Discharge Note    Name: Juan Carlos Yoo Sr.  MRN: [id number]  Admitting Diagnosis: Decompensated cirrhosis  Recent Surgery: * No surgery found *      Recommendations:     Discharge Recommendations: No Therapy Indicated  Discharge Equipment Recommendations: none  Barriers to discharge:  Other (Comment) (Medical status)    Assessment:     Juan Carlos Yoo Sr. is a 69 y.o. male with a medical diagnosis of Decompensated cirrhosis. At this time, patient is functioning at their prior level of function and does not require further acute OT services.     Pt ambulated x5 to / from toilet independently prior to evaluation on this date with use of IV pole and grab bar.    Plan:     During this hospitalization, patient does not require further acute OT services.  Please re-consult if situation changes.    Plan of Care Reviewed with: patient    Subjective     Chief Complaint: pain in scrotum   Patient/Family Comments/goals: Pt would like to return home.     Occupational Profile:  Living Environment: Pt lives with his son in a mobile home with ramp to enter/exit.  Previous level of function: Independent  Roles and Routines: ADL's  Equipment Used at home: none (But, has wheelchair, RW, BSC, and SPC if needed.)  Assistance upon Discharge: Son and stepdaughter    Pain/Comfort:  Pain Rating 1: 7/10 (with bed mobility)  Location 1: scrotum  Pain Addressed 1: Reposition, Distraction  Pain Rating Post-Intervention 1: 2/10 (at rest)    Patients cultural, spiritual, Holiness conflicts given the current situation: no    Objective:     Communicated with: nurse prior to session.  Patient found HOB elevated with peripheral IV upon OT entry to room.    General Precautions: Standard,    Orthopedic Precautions: N/A  Braces: N/A  Respiratory Status: Room air     Occupational Performance:    Bed Mobility:    Patient completed Rolling/Turning to Left with  independence  Patient completed Rolling/Turning to Right with  independence  Patient completed Scooting/Bridging with independence  Patient completed Supine to Sit with independence  Patient completed Sit to Supine with independence    Functional Mobility/Transfers:  Patient completed Sit <> Stand Transfer with independence  with  no assistive device   Patient completed Toilet Transfer Step Transfer technique with modified independence with  grab bars  Functional Mobility: Pt ambulated 36' between surfaces utilizing IV pole without LOB.     Activities of Daily Living:  Feeding:  independence    Grooming: independence    Bathing: setup assistance    Upper Body Dressing: independence    Lower Body Dressing: setup assistance    Toileting: modified independence      Cognitive/Visual Perceptual:  Cognitive/Psychosocial Skills:  -       Oriented to: Person, Place, and Situation   -       Follows Commands/attention:Follows multistep  commands  -       Communication: clear/fluent  -       Memory: No Deficits noted  -       Safety awareness/insight to disability: intact   -       Mood/Affect/Coping skills/emotional control: Cooperative    Physical Exam:  Postural examination/scapula alignment: -       Rounded shoulders  Edema:  Severe scrotum  Sensation: -       Intact  Dominant hand: -       Right  Upper Extremity Range of Motion:  -       Right Upper Extremity: WFL except 60 degrees shoulder flexion in plane of scaption  -       Left Upper Extremity: WFL  Upper Extremity Strength: -       Right Upper Extremity: WFL except 2+ to 3/5 shoulder  -       Left Upper Extremity: WFL  Fine Motor Coordination: -       Intact  Gross motor coordination: WFL    AMPAC 6 Click ADL:  AMPAC Total Score: 24    Patient left HOB elevated with all lines intact, call button in reach, and nurse notified    GOALS:   Evaluation only      DME Justifications:  No DME recommended requiring DME justifications    History:     Past Medical History:   Diagnosis Date    Atrial fibrillation     Bulging disc      "Cirrhosis     Colon polyp 12/29/2017    Rpt 5 yrs    Encounter for blood transfusion     EV (esophageal varices)     Hypertension 03/30/2023    Renal disorder     Skin cancer 03/2017    'NOSE"    Thrombocytopenia          Past Surgical History:   Procedure Laterality Date    CATHETERIZATION OF BOTH LEFT AND RIGHT HEART N/A 02/08/2023    Procedure: CATHETERIZATION, HEART, BOTH LEFT AND RIGHT;  Surgeon: Flo Malone MD;  Location: [facility] CATH LAB;  Service: Cardiology;  Laterality: N/A;  low bleeding risk 1.0%    CHOLECYSTECTOMY      COLONOSCOPY      COLONOSCOPY N/A 12/29/2017    ta rpt 2022    CORONARY ANGIOGRAPHY N/A 02/08/2023    Procedure: ANGIOGRAM, CORONARY ARTERY;  Surgeon: Flo Malone MD;  Location: [facility] CATH LAB;  Service: Cardiology;  Laterality: N/A;    HERNIA REPAIR      INCISION AND DRAINAGE Right 02/27/2025    Procedure: Incision and Drainage, thigh;  Surgeon: Kayla Foster MD;  Location: [facility];  Service: General;  Laterality: Right;  Plan to go first if pt has cardiac clearance - SB    SKIN BIOPSY  03/2017    TONSILLECTOMY      UPPER GASTROINTESTINAL ENDOSCOPY  2011    EV    UPPER GASTROINTESTINAL ENDOSCOPY  2016    VASECTOMY         Time Tracking:     OT Date of Treatment: 04/14/25  OT Start Time: 1401  OT Stop Time: 1429  OT Total Time (min): 28 min    Billable Minutes:Evaluation 28    4/14/2025  "

## 2025-04-14 NOTE — ASSESSMENT & PLAN NOTE
2/2 to cirrhosis, diuresis on board, replete electrolytes, monitor urine output    4/14 FM:  Stopping midrodine today, possible related to volume retention.

## 2025-04-14 NOTE — PLAN OF CARE
[facility]  Initial Discharge Assessment       Primary Care Provider: Joao Villegas III, MD    Admission Diagnosis: Anasarca [R60.1]  Scrotal edema [N50.89]  Lower leg edema [R60.0]  Acute on chronic systolic (congestive) heart failure [I50.23]    Admission Date: 4/12/2025  Expected Discharge Date:     Transition of Care Barriers: None    Payor: BLUE CROSS BLUE SHIELD / Plan: [employer] [place] PPO / Product Type: PPO /     Extended Emergency Contact Information  Primary Emergency Contact: JACQUIE RODRIGUEZ JR  Mobile Phone: [phone]  Relation: Son  Preferred language: English   needed? No  Secondary Emergency Contact: Chante White  Mobile Phone: [phone]  Relation: Relative    Discharge Plan A: Home with family, Home Health  Discharge Plan B: Home with family, Home Health      [id number] - [place], [place] - [address] [address]  [address]  [facility] [address]  Phone: [phone] Fax: [phone]      Initial Assessment (most recent)       Adult Discharge Assessment - 04/14/25 1354          Discharge Assessment    Assessment Type Discharge Planning Assessment     Confirmed/corrected address, phone number and insurance Yes     Confirmed Demographics Correct on Facesheet     Source of Information patient     When was your last doctors appointment? 03/27/25     Communicated AUTUMN with patient/caregiver Date not available/Unable to determine     Reason For Admission Decompensated cirrhosis     People in Home child(whit), adult   Patient usually lives alone but has been living with his son since last admission.    Do you expect to return to your current living situation? Yes     Do you have help at home or someone to help you manage your care at home? Yes     Who are your caregiver(s) and their phone number(s)? Jacquie Rodriguez Jr. (son) [phone]  Chante Elliott (step daughter) [phone]     Prior to hospitilization cognitive status: Alert/Oriented     Current cognitive status: Alert/Oriented      Walking or Climbing Stairs Difficulty no   Patient does have a walker, cane, wheelchair he does not use.    Dressing/Bathing Difficulty no     Home Layout Able to live on 1st floor     Equipment Currently Used at Home none;other (see comments)   Patient does have a walker, cane, wheelchair he does not use.    Readmission within 30 days? No     Patient currently being followed by outpatient case management? No     Do you currently have service(s) that help you manage your care at home? No     Do you take prescription medications? Yes     Do you have prescription coverage? Yes     Coverage BCBS     Do you have any problems affording any of your prescribed medications? No     Is the patient taking medications as prescribed? yes     Who is going to help you get home at discharge? family     How do you get to doctors appointments? car, drives self;family or friend will provide     Are you on dialysis? No     Do you take coumadin? No     Discharge Plan A Home with family;Home Health     Discharge Plan B Home with family;Home Health     DME Needed Upon Discharge  none     Discharge Plan discussed with: Patient     Transition of Care Barriers None                   Discharge assessment completed with patient.  Patient does have home health services with [facility] that patient wishes to resume when discharged.  Patient usually lives alone but has been living with his son since his last admission into the hospital.  Discharge planning checklist given to patient/family with instructions to contact  for any needs.  SW will follow as needed.

## 2025-04-14 NOTE — ASSESSMENT & PLAN NOTE
Patient with known Cirrhosis with Child's class Calculator for Child's score link- [email or web address]://[email or web address].[email or web address]/[email or web address]/child-ocampo-score-cirrhosis-mortality#creator-insights. Co-morbidities are present and inclusive of esophageal varices, hepatic encephalopathy, and anemia/pancytopenia.  MELD-Na score calculated; MELD 3.0: 28 at 4/13/2025  8:55 PM  MELD-Na: 27 at 4/13/2025  8:55 PM  Calculated from:  Serum Creatinine: 2 mg/dL at 4/13/2025  8:55 PM  Serum Sodium: 133 mmol/L at 4/13/2025  8:55 PM  Total Bilirubin: 5 mg/dL at 4/13/2025  5:50 AM  Serum Albumin: 2 g/dL at 4/13/2025  5:50 AM  INR(ratio): 1.7 at 4/13/2025 12:42 PM  Age at listing (hypothetical): 69 years  Sex: Male at 4/13/2025  8:55 PM      Continue chronic meds. Etiology likely ETOH. Will avoid any hepatotoxic meds, and monitor CBC/CMP/INR for synthetic function.

## 2025-04-14 NOTE — PROGRESS NOTES
[facility]  Cardiology  Progress Note    Patient Name: Juan Carlos Yoo Sr.  MRN: [id number]  Admission Date: 4/12/2025  Hospital Length of Stay: 1 days  Code Status: Full Code   Attending Physician: Joao Villegas III, MD   Primary Care Physician: Joao Villegas III, MD  Expected Discharge Date:   Principal Problem:Decompensated cirrhosis    Subjective:     Reason For Consult: Bilateral Lower Extremity and Scrotal Edema    Hospital Course:     Patient is a 70 yo male with a history of chronic atrial fibrillation, chronic diastolic heart failure, cirrhosis, and prior need for dialysis presenting with worsening bilateral lower extremity edema and scrotal swelling. Patient reports waking up recently with increased swelling that progressively worsened, particularly affecting his genitals, causing significant discomfort with ambulation. He presented to the ED yesterday and was admitted. He reports compliance with his medications including Lasix and spironolactone, though occasionally taking doses 1-2 hours late. He endorses mild intermittent exertional dyspnea, notably while walking to his truck, but denies orthopnea. Denies fever. Patient has a dialysis port scheduled for removal this Wednesday, with pre-op clearance obtained from cardiology last Friday. Recent echocardiogram in December showed preserved cardiac function with known atrial fibrillation. EKG from the 21st confirmed chronic A-fib. Patient denies palpitations or awareness of irregular rhythm. 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 Denies history of smoking or illicit drug use. No prior cardiac stents.    Interval History:     4/14/25: Patient reports improved shortness of breath. He reports stable scrotal and bilateral lower extremity edema. Telemetry this morning shows atrial fibrillation with a controlled rate. -156 mL urine output since yesterday.  Hemodynamics well controlled overnight. Electrolytes stable this morning. Renal function worsening since admission.     Review of Systems   Constitutional: Negative for chills, fever and malaise/fatigue.   Cardiovascular:  Positive for leg swelling. Negative for chest pain and palpitations.   Respiratory:  Positive for shortness of breath. Negative for cough and wheezing.    Skin: Negative.    Gastrointestinal:  Positive for bloating. Negative for abdominal pain, nausea and vomiting.   Genitourinary:         Positive for scrotal swelling.      Objective:     Vital Signs (Most Recent):  Temp: 98 °F (36.7 °C) (04/15/25 0802)  Pulse: 91 (04/15/25 1446)  Resp: 19 (04/15/25 0802)  BP: (!) 106/58 (04/15/25 0843)  SpO2: 98 % (04/15/25 0802) Vital Signs (24h Range):  Temp:  [97.8 °F (36.6 °C)-98.1 °F (36.7 °C)] 98 °F (36.7 °C)  Pulse:  [] 91  Resp:  [19-20] 19  SpO2:  [95 %-98 %] 98 %  BP: ()/(53-60) 106/58     Weight: (!) 140.1 kg (308 lb 13.8 oz)  Body mass index is 40.75 kg/m².    SpO2: 98 %         Intake/Output Summary (Last 24 hours) at 4/15/2025 1650  Last data filed at 4/15/2025 0631  Gross per 24 hour   Intake --   Output 2450 ml   Net -2450 ml       Lines/Drains/Airways       Central Venous Catheter Line  Duration                  Hemodialysis Catheter 12/16/24 1152 right internal jugular 120 days              Drain  Duration                  Open Drain 02/27/25 0815 Tube - 1 Right Thigh Penrose 1/4 inch 47 days              Peripheral Intravenous Line  Duration                  Peripheral IV - Single Lumen 04/15/25 0315 20 G No Anterior;Left Wrist <1 day                    Physical Exam  Constitutional:       General: He is not in acute distress.  HENT:      Head: Normocephalic and atraumatic.   Eyes:      Extraocular Movements: Extraocular movements intact.   Cardiovascular:      Rate and Rhythm: Tachycardia present. Rhythm irregular.      Pulses: Normal pulses.      Heart sounds: Normal heart  "sounds.   Pulmonary:      Effort: Pulmonary effort is normal.   Abdominal:      General: There is distension.   Musculoskeletal:      Right lower leg: Edema present.      Left lower leg: Edema present.   Skin:     General: Skin is warm and dry.   Neurological:      Mental Status: He is alert. Mental status is at baseline.   Psychiatric:         Mood and Affect: Mood normal.         Behavior: Behavior normal.         Significant Labs:     BMP:   Recent Labs   Lab 04/13/25  2055 04/15/25  0536   * 135*   K 3.6 3.4*    99   CO2 27 29   BUN 13 15   CREATININE 2.0* 2.2*   CALCIUM 8.3* 8.5*   MG  --  1.4*     CMP:   Recent Labs   Lab 04/13/25  2055 04/15/25  0536   * 135*   K 3.6 3.4*    99   CO2 27 29   BUN 13 15   CREATININE 2.0* 2.2*   CALCIUM 8.3* 8.5*   ALBUMIN  --  2.0*   BILITOT  --  3.5*   ALKPHOS  --  78   AST  --  26   ALT  --  9*   ANIONGAP 6* 7*     CBC:   Recent Labs   Lab 04/15/25  0536   WBC 4.63   HGB 9.3*   HCT 28.2*   PLT 67*   , Lipid Panel No results for input(s): "CHOL", "HDL", "LDLCALC", "TRIG", "CHOLHDL" in the last 48 hours., Troponin No results for input(s): "TROPONINIHS" in the last 48 hours., and All pertinent lab results from the last 24 hours have been reviewed.    Significant Imaging:     Echocardiogram: Transthoracic echo (TTE) complete (Cupid Only):   Results for orders placed or performed during the hospital encounter of 04/12/25   Echo   Result Value Ref Range    BSA 2.65 m2    LVOT stroke volume 60.9 cm3    LVIDd 5.3 3.5 - 6.0 cm    LV Systolic Volume 58 mL    LV Systolic Volume Index 22.7 mL/m2    LVIDs 3.7 2.1 - 4.0 cm    LV Diastolic Volume 135 mL    LV Diastolic Volume Index 52.73 mL/m2    Left Ventricular End Systolic Volume by Teichholz Method 57.57 mL    Left Ventricular End Diastolic Volume by Teichholz Method 134.91 mL    IVS 1.1 0.6 - 1.1 cm    LVOT diameter 2.1 cm    LVOT area 3.5 cm2    FS 30.2 28 - 44 %    Left Ventricle Relative Wall Thickness 0.34 " cm    PW 0.9 0.6 - 1.1 cm    LV mass 200.4 g    LV Mass Index 78.3 g/m2    MV Peak E Owen 1.42 m/s    TDI LATERAL 0.11 m/s    TDI SEPTAL 0.13 m/s    E/E' ratio 12 m/s    TR Max Owen 3.2 m/s    E wave deceleration time 208 msec    LV SEPTAL E/E' RATIO 10.9 m/s    LV LATERAL E/E' RATIO 12.9 m/s    LVOT peak owen 0.8 m/s    Left Ventricular Outflow Tract Mean Velocity 0.57 cm/s    Left Ventricular Outflow Tract Mean Gradient 1.46 mmHg    RV- wilson basal diam 3.6 cm    RV/LV Ratio 0.68 cm    LA size 6.0 cm    Left Atrium Major Axis 7.3 cm    RA Major Axis 6.50 cm    AV mean gradient 4 mmHg    AV peak gradient 7 mmHg    Ao peak owen 1.3 m/s    Ao VTI 27.5 cm    LVOT peak VTI 17.6 cm    AV valve area 2.2 cm²    AV Velocity Ratio 0.62     AV index (prosthetic) 0.64     TONY by Velocity Ratio 2.1 cm²    Mr max owen 4.58 m/s    MV stenosis pressure 1/2 time 60.45 ms    MV valve area p 1/2 method 3.64 cm2    Triscuspid Valve Regurgitation Peak Gradient 41 mmHg    RVOT peak owen 0.58 m/s    Ao root annulus 3.39 cm    IVC diameter 3.29 cm    Mean e' 0.12 m/s    ZLVIDS -7.60     ZLVIDD -11.30     RVDD 3.60 cm    AORTIC VALVE CUSP SEPERATION 1.15 cm    TAPSE 1.80 cm    TV resting pulmonary artery pressure 56 mmHg    RV TB RVSP 18 mmHg    Est. RA pres 15 mmHg    Narrative      Left Ventricle: The left ventricle is normal in size. Mildly increased   wall thickness. There is normal systolic function with a visually   estimated ejection fraction of 55 - 60%. Unable to assess diastolic   function due to atrial fibrillation.    Right Ventricle: The right ventricle has moderate enlargement. Systolic   function is normal.    Left Atrium: Severely dilated    Right Atrium: Right atrium is moderately dilated.    Aortic Valve: The aortic valve is a trileaflet valve. There is mild   aortic valve sclerosis.    Mitral Valve: There is mild regurgitation.    Tricuspid Valve: There is mild to moderate regurgitation.    IVC/SVC: Elevated venous pressure at  15 mmHg.    Left pleural effusion.       Assessment and Plan:    Acute CHF: Patient presented to Emergency Department with SOB, scrotal edema, and BLE edema. He was started on IV furosemide. This morning, he reports improving SOB; however, he continues to report scrotal and BLE edema.             - Continue IV Lasix as dosed            - Continue spironolactone as dosed            - Monitor daily I's & O's            - Low Na+ diet            - Echocardiogram pending    Atrial Fibrillation: Patient with history of atrial fibrillation. Telemetry this morning continues to show atrial fibrillation with a controlled rate. He is not currently on anticoagulation given cirrhosis.            - Continue metoprolol tartrate as dosed           - Continue telemetry           - Agree with no anticoagulation given cirrhosis    Cirrhosis: Management per primary team         Active Diagnoses:    Diagnosis Date Noted POA    PRINCIPAL PROBLEM:  Decompensated cirrhosis [K72.90, K74.60] 10/04/2024 Yes    Scrotal edema [N50.89] 04/13/2025 Yes    Anasarca [R60.1] 04/13/2025 Yes    Thrombocytopenia [D69.6] 11/21/2024 Yes    Hepatic encephalopathy [K76.82] 11/21/2024 Yes    CKD (chronic kidney disease) stage 4, GFR 15-29 ml/min [N18.4] 11/20/2024 Yes    Longstanding persistent atrial fibrillation [I48.11] 11/29/2022 Yes    Cirrhosis [K74.60] 07/24/2014 Yes      Problems Resolved During this Admission:       VTE Risk Mitigation (From admission, onward)           Ordered     IP VTE HIGH RISK PATIENT  Once         04/12/25 2327     Place sequential compression device  Until discontinued         04/12/25 2327     Place ANDREW hose  Until discontinued         04/12/25 2327                  Provider's Attestation:  I, Luis Liu MD, confirm that OCTAVIA Starr worked under my direction at all times.  Documentation shall reflect findings and decisions made by myself in the course of the patient's treatment.  I have performed a face to face  evaluation of the patient and reviewed the medical record. In addition, I have performed the substantive portion of the medical decision making. I have reviewed the notes and assessment, and I concur with her documentation.  CMS guidelines regarding the use of scribes shall be adhered to.  MD Luis Lewis MD  Cardiology  [facility]

## 2025-04-14 NOTE — ASSESSMENT & PLAN NOTE
Patient has persistent (7 days or more) atrial fibrillation. Patient is currently in atrial fibrillation. [id number] Score: 1. The patients heart rate in the last 24 hours is as follows:  Pulse  Min: 75  Max: 115     Antiarrhythmics  metoprolol tartrate (LOPRESSOR) tablet 50 mg, 2 times daily, Oral    Anticoagulants       Plan  - Replete lytes with a goal of K>4, Mg >2  - Patient is not anticoagulated due to thrombocytopenia   - Patient's afib is currently uncontrolled. Will adjust treatment as follows: adjust lopressor dose  - cardiology consulted    4/14 FM:  No anticoags 2nd CLD, Plt Count.

## 2025-04-14 NOTE — PROGRESS NOTES
"[facility] Medicine  Progress Note    Patient Name: Juan Carlos Yoo Sr.  MRN: [id number]  Patient Class: OP- Observation   Admission Date: 4/12/2025  Length of Stay: 0 days  Attending Physician: Joao Villegas III, MD  Primary Care Provider: Joao Villegas III, MD        Subjective     Principal Problem:Decompensated cirrhosis        HPI:  Patient 69-year-old male history of AFib, cirrhosis, hypertension came to the ED with complaints of increased swelling to testicular region, lower extremity, patient had been seen previously for same issue, patient is on Lasix and Aldactone, increased pain due to swelling, associated orthopnea and dyspnea on exertion reported no nausea vomiting reported, patient denied any fever chills    Overview/Hospital Course:  4/14 FM:  Patient known to me, patient presented to the emergency department with an acute 6 lb weight gain severe painful scrotal edema and dyspnea.  Patient is on an IV Lasix drip with some response.  Scrotum is still massive today.  Patient is not taking anticoagulation because of his thrombocytopenia and cirrhosis cardiology has been consulted patient also had a planned surgical removal of his tunneled dialysis catheter in the right chest for this week which may be on hold depending on how he responds to treatment this admission.  I have contacted surgery.    Past Medical History:   Diagnosis Date    Atrial fibrillation     Bulging disc     Cirrhosis     Colon polyp 12/29/2017    Rpt 5 yrs    Encounter for blood transfusion     EV (esophageal varices)     Hypertension 03/30/2023    Renal disorder     Skin cancer 03/2017    'NOSE"    Thrombocytopenia        Past Surgical History:   Procedure Laterality Date    CATHETERIZATION OF BOTH LEFT AND RIGHT HEART N/A 02/08/2023    Procedure: CATHETERIZATION, HEART, BOTH LEFT AND RIGHT;  Surgeon: Flo Malone MD;  Location: [facility] CATH LAB;  Service: Cardiology;  Laterality: N/A;  low bleeding risk 1.0%    " CHOLECYSTECTOMY      COLONOSCOPY      COLONOSCOPY N/A 12/29/2017    ta rpt 2022    CORONARY ANGIOGRAPHY N/A 02/08/2023    Procedure: ANGIOGRAM, CORONARY ARTERY;  Surgeon: Flo Malone MD;  Location: [facility] CATH LAB;  Service: Cardiology;  Laterality: N/A;    HERNIA REPAIR      INCISION AND DRAINAGE Right 02/27/2025    Procedure: Incision and Drainage, thigh;  Surgeon: Kayla Foster MD;  Location: [facility] OR;  Service: General;  Laterality: Right;  Plan to go first if pt has cardiac clearance - SB    SKIN BIOPSY  03/2017    TONSILLECTOMY      UPPER GASTROINTESTINAL ENDOSCOPY  2011    EV    UPPER GASTROINTESTINAL ENDOSCOPY  2016    VASECTOMY         Review of patient's allergies indicates:  No Known Allergies    No current facility-administered medications on file prior to encounter.     Current Outpatient Medications on File Prior to Encounter   Medication Sig    famotidine (PEPCID) 20 MG tablet Take 1 tablet (20 mg total) by mouth once daily.    furosemide (LASIX) 80 MG tablet Take 1 tablet (80 mg total) by mouth 2 (two) times a day for 3 days, THEN 1 tablet (80 mg total) once daily.    lactulose (CHRONULAC) 20 gram/30 mL Soln Take 45 mLs (30 g total) by mouth 3 (three) times daily.    magnesium chloride (MAG 64) 64 mg TbEC Take 2 tablets (128 mg total) by mouth once daily.    metoprolol tartrate (LOPRESSOR) 25 MG tablet TAKE 0.5 TABLETS BY MOUTH 2 TIMES DAILY.    midodrine (PROAMATINE) 5 MG Tab Take 3 tablets (15 mg total) by mouth 3 (three) times daily with meals.    potassium bicarbonate (K-LYTE) disintegrating tablet Take 1 tablet (25 mEq total) by mouth 2 (two) times a day.    spironolactone (ALDACTONE) 25 MG tablet Take 1 tablet (25 mg total) by mouth 2 (two) times daily.    tamsulosin (FLOMAX) 0.4 mg Cap Take 1 capsule (0.4 mg total) by mouth once daily.    thiamine 100 MG tablet Take 100 mg by mouth once daily.    apixaban (ELIQUIS) 5 mg Tab Take 5 mg by mouth 2 (two) times daily.     Family History        Problem Relation (Age of Onset)    Alzheimer's disease Mother    Cancer Maternal Uncle    Heart disease Maternal Uncle    Hyperlipidemia Brother    No Known Problems Father    Ovarian cancer Sister          Tobacco Use    Smoking status: Never     Passive exposure: Never    Smokeless tobacco: Never   Substance and Sexual Activity    Alcohol use: Not Currently     Alcohol/week: 0.0 standard drinks of alcohol    Drug use: No    Sexual activity: Not Currently     Review of Systems   Constitutional:  Positive for fatigue. Negative for chills and fever.   HENT:  Negative for congestion.    Eyes:  Negative for visual disturbance.   Respiratory:  Positive for shortness of breath. Negative for wheezing.    Cardiovascular:  Positive for leg swelling. Negative for chest pain and palpitations.   Gastrointestinal:  Positive for abdominal distention. Negative for abdominal pain, constipation, diarrhea, nausea and vomiting.   Endocrine: Negative for polyuria.   Genitourinary:  Positive for scrotal swelling (Scrotal edema). Negative for dysuria.   Musculoskeletal:  Positive for arthralgias. Negative for back pain and gait problem.   Skin:  Negative for wound.   Neurological:  Positive for weakness.   Psychiatric/Behavioral:  The patient is not nervous/anxious.      Objective:     Vital Signs (Most Recent):  Temp: 97.7 °F (36.5 °C) (04/14/25 0802)  Pulse: 96 (04/14/25 0802)  Resp: 18 (04/14/25 0802)  BP: (!) 107/57 (04/14/25 0802)  SpO2: 95 % (04/14/25 0802) Vital Signs (24h Range):  Temp:  [97.7 °F (36.5 °C)-98.4 °F (36.9 °C)] 97.7 °F (36.5 °C)  Pulse:  [] 96  Resp:  [18-20] 18  SpO2:  [95 %-100 %] 95 %  BP: ()/(53-65) 107/57     Weight: 136.1 kg (300 lb)  Body mass index is 39.58 kg/m².     Physical Exam  Vitals and nursing note reviewed.   Constitutional:       General: He is not in acute distress.     Appearance: He is well-developed. He is obese. He is ill-appearing. He is not diaphoretic.   HENT:      Head:  Normocephalic and atraumatic.      Nose: No congestion or rhinorrhea.   Eyes:      General: Scleral icterus present.         Right eye: No discharge.         Left eye: No discharge.      Extraocular Movements: Extraocular movements intact.   Neck:      Thyroid: No thyromegaly.      Vascular: No JVD.   Cardiovascular:      Rate and Rhythm: Normal rate. Rhythm irregular.      Heart sounds: Normal heart sounds. No murmur heard.     No friction rub. No gallop.   Pulmonary:      Effort: No respiratory distress.      Breath sounds: No wheezing, rhonchi or rales.   Abdominal:      General: There is distension.      Tenderness: There is no abdominal tenderness. There is no guarding or rebound.      Hernia: No hernia is present.   Genitourinary:     Comments: Scrotal edema (massive)  Musculoskeletal:      Cervical back: Neck supple.      Right lower leg: Edema present.      Left lower leg: Edema present.   Neurological:      Mental Status: He is alert and oriented to person, place, and time. Mental status is at baseline.      Motor: Weakness present.   Psychiatric:         Behavior: Behavior normal.         Thought Content: Thought content normal.                Significant Labs: All pertinent labs within the past 24 hours have been reviewed.  Recent Lab Results         04/13/25 2055 04/13/25  1242        Ammonia   48       Anion Gap 6         BUN 13         Calcium 8.3         Chloride 100         CO2 27         Creatinine 2.0         eGFR 35  Comment: Estimated GFR calculated using the CKD-EPI creatinine (2021) equation.         Glucose 121         INR   1.7  Comment: Coumadin Therapy:    2.0 - 3.0 for INR for all indicators except mechanical heart valves    and antiphospholipid syndromes which should use 2.5 - 3.5.       Potassium 3.6         PT   18.7       Sodium 133                 Significant Imaging: I have reviewed all pertinent imaging results/findings within the past 24 hours.      Assessment &  Plan  Decompensated cirrhosis  Patient with known Cirrhosis with Child's class Calculator for Child's score link- [email or web address]-[email or web address]-score-[email or web address]-mortality#creator-insights. Co-morbidities are present and inclusive of esophageal varices, hepatic encephalopathy, and anemia/pancytopenia.  MELD-Na score calculated; MELD 3.0: 28 at 4/13/2025  8:55 PM  MELD-Na: 27 at 4/13/2025  8:55 PM  Calculated from:  Serum Creatinine: 2 mg/dL at 4/13/2025  8:55 PM  Serum Sodium: 133 mmol/L at 4/13/2025  8:55 PM  Total Bilirubin: 5 mg/dL at 4/13/2025  5:50 AM  Serum Albumin: 2 g/dL at 4/13/2025  5:50 AM  INR(ratio): 1.7 at 4/13/2025 12:42 PM  Age at listing (hypothetical): 69 years  Sex: Male at 4/13/2025  8:55 PM      Continue chronic meds. Etiology likely ETOH. Will avoid any hepatotoxic meds, and monitor CBC/CMP/INR for synthetic function.   Cirrhosis  Patient with known Cirrhosis with Child's class Calculator for Child's score link- [email or web address]-ocampo-score-cirrhosis-mortality#creator-insights. Co-morbidities are present and inclusive of ascites and anemia/pancytopenia.  MELD-Na score calculated; MELD 3.0: 28 at 4/13/2025  8:55 PM  MELD-Na: 27 at 4/13/2025  8:55 PM  Calculated from:  Serum Creatinine: 2 mg/dL at 4/13/2025  8:55 PM  Serum Sodium: 133 mmol/L at 4/13/2025  8:55 PM  Total Bilirubin: 5 mg/dL at 4/13/2025  5:50 AM  Serum Albumin: 2 g/dL at 4/13/2025  5:50 AM  INR(ratio): 1.7 at 4/13/2025 12:42 PM  Age at listing (hypothetical): 69 years  Sex: Male at 4/13/2025  8:55 PM      Continue chronic meds. Etiology likely ETOH. Will avoid any hepatotoxic meds, and monitor CBC/CMP/INR for synthetic function.   Patient reported   Longstanding persistent atrial fibrillation  Patient has persistent (7 days or more) atrial fibrillation. Patient is currently in atrial fibrillation. [id number] Score: 1. The patients heart rate in the last 24 hours is as follows:  Pulse  Min: 75  Max:  115     Antiarrhythmics  metoprolol tartrate (LOPRESSOR) tablet 50 mg, 2 times daily, Oral    Anticoagulants       Plan  - Replete lytes with a goal of K>4, Mg >2  - Patient is not anticoagulated due to thrombocytopenia   - Patient's afib is currently uncontrolled. Will adjust treatment as follows: adjust lopressor dose  - cardiology consulted    4/14 FM:  No anticoags 2nd CLD, Plt Count.  Thrombocytopenia  The likely etiology of thrombocytopenia is liver disease. The patients 3 most recent labs are listed below.  Recent Labs     04/12/25  1653 04/13/25  0550   PLT 61* 78*     Plan  - Will transfuse if platelet count is <20k.    Scrotal edema  4/14 FM:  Not much change overnight, cont IV lasix drip.    Anasarca  2/2 to cirrhosis, diuresis on board, replete electrolytes, monitor urine output    4/14 FM:  Stopping midrodine today, possible related to volume retention.  CKD (chronic kidney disease) stage 4, GFR 15-29 ml/min  Creatine stable for now. BMP reviewed- noted Estimated Creatinine Clearance: 49.1 mL/min (A) (based on SCr of 2 mg/dL (H)). according to latest data. Based on current GFR, CKD stage is stage 4 - GFR 15-29.  Monitor UOP and serial BMP and adjust therapy as needed. Renally dose meds. Avoid nephrotoxic medications and procedures.    4/14 FM:  Will not DC tunneled cath just yet.  Hepatic encephalopathy  4/14 FM:  Stable.    VTE Risk Mitigation (From admission, onward)           Ordered     IP VTE HIGH RISK PATIENT  Once         04/12/25 2327     Place sequential compression device  Until discontinued         04/12/25 2327     Place ANDREW hose  Until discontinued         04/12/25 2327                    Discharge Planning   AUTUMN:      Code Status: Full Code   Medical Readiness for Discharge Date:                    Please place Justification for DME        Joao Villegas III, MD  Department of Hospital Medicine   [facility]

## 2025-04-15 LAB
ABSOLUTE EOSINOPHIL (OHS): 0.15 K/UL
ABSOLUTE MONOCYTE (OHS): 0.78 K/UL (ref 0.3–1)
ABSOLUTE NEUTROPHIL COUNT (OHS): 2.73 K/UL (ref 1.8–7.7)
ALBUMIN SERPL BCP-MCNC: 2 G/DL (ref 3.5–5.2)
ALP SERPL-CCNC: 78 UNIT/L (ref 40–150)
ALT SERPL W/O P-5'-P-CCNC: 9 UNIT/L (ref 10–44)
ANION GAP (OHS): 7 MMOL/L (ref 8–16)
AST SERPL-CCNC: 26 UNIT/L (ref 11–45)
BASOPHILS # BLD AUTO: 0.06 K/UL
BASOPHILS NFR BLD AUTO: 1.3 %
BILIRUB SERPL-MCNC: 3.5 MG/DL (ref 0.1–1)
BUN SERPL-MCNC: 15 MG/DL (ref 8–23)
CALCIUM SERPL-MCNC: 8.5 MG/DL (ref 8.7–10.5)
CHLORIDE SERPL-SCNC: 99 MMOL/L (ref 95–110)
CO2 SERPL-SCNC: 29 MMOL/L (ref 23–29)
CREAT SERPL-MCNC: 2.2 MG/DL (ref 0.5–1.4)
ERYTHROCYTE [DISTWIDTH] IN BLOOD BY AUTOMATED COUNT: 15.9 % (ref 11.5–14.5)
GFR SERPLBLD CREATININE-BSD FMLA CKD-EPI: 31 ML/MIN/1.73/M2
GLUCOSE SERPL-MCNC: 93 MG/DL (ref 70–110)
HCT VFR BLD AUTO: 28.2 % (ref 40–54)
HGB BLD-MCNC: 9.3 GM/DL (ref 14–18)
IMM GRANULOCYTES # BLD AUTO: 0.01 K/UL (ref 0–0.04)
IMM GRANULOCYTES NFR BLD AUTO: 0.2 % (ref 0–0.5)
LYMPHOCYTES # BLD AUTO: 0.9 K/UL (ref 1–4.8)
MAGNESIUM SERPL-MCNC: 1.4 MG/DL (ref 1.6–2.6)
MCH RBC QN AUTO: 31.8 PG (ref 27–31)
MCHC RBC AUTO-ENTMCNC: 33 G/DL (ref 32–36)
MCV RBC AUTO: 97 FL (ref 82–98)
NUCLEATED RBC (/100WBC) (OHS): 0 /100 WBC
PLATELET # BLD AUTO: 67 K/UL (ref 150–450)
PMV BLD AUTO: 11.9 FL (ref 9.2–12.9)
POTASSIUM SERPL-SCNC: 3.4 MMOL/L (ref 3.5–5.1)
PROT SERPL-MCNC: 6.3 GM/DL (ref 6–8.4)
RBC # BLD AUTO: 2.92 M/UL (ref 4.6–6.2)
RELATIVE EOSINOPHIL (OHS): 3.2 %
RELATIVE LYMPHOCYTE (OHS): 19.4 % (ref 18–48)
RELATIVE MONOCYTE (OHS): 16.8 % (ref 4–15)
RELATIVE NEUTROPHIL (OHS): 59.1 % (ref 38–73)
SODIUM SERPL-SCNC: 135 MMOL/L (ref 136–145)
WBC # BLD AUTO: 4.63 K/UL (ref 3.9–12.7)

## 2025-04-15 PROCEDURE — 25000003 PHARM REV CODE 250: Performed by: INTERNAL MEDICINE

## 2025-04-15 PROCEDURE — 82040 ASSAY OF SERUM ALBUMIN: CPT | Performed by: INTERNAL MEDICINE

## 2025-04-15 PROCEDURE — 63600175 PHARM REV CODE 636 W HCPCS: Performed by: EMERGENCY MEDICINE

## 2025-04-15 PROCEDURE — 85025 COMPLETE CBC W/AUTO DIFF WBC: CPT | Performed by: INTERNAL MEDICINE

## 2025-04-15 PROCEDURE — 21400001 HC TELEMETRY ROOM

## 2025-04-15 PROCEDURE — 25000003 PHARM REV CODE 250: Performed by: EMERGENCY MEDICINE

## 2025-04-15 PROCEDURE — 36415 COLL VENOUS BLD VENIPUNCTURE: CPT | Performed by: INTERNAL MEDICINE

## 2025-04-15 PROCEDURE — 83735 ASSAY OF MAGNESIUM: CPT | Performed by: INTERNAL MEDICINE

## 2025-04-15 RX ORDER — FAMOTIDINE 20 MG/1
20 TABLET, FILM COATED ORAL DAILY
Status: DISCONTINUED | OUTPATIENT
Start: 2025-04-16 | End: 2025-04-24 | Stop reason: HOSPADM

## 2025-04-15 RX ORDER — MAGNESIUM CHLORIDE 64 MG
64 TABLET, DELAYED RELEASE (ENTERIC COATED) ORAL 2 TIMES DAILY
Status: DISCONTINUED | OUTPATIENT
Start: 2025-04-15 | End: 2025-04-22

## 2025-04-15 RX ADMIN — POTASSIUM BICARBONATE 20 MEQ: 391 TABLET, EFFERVESCENT ORAL at 09:04

## 2025-04-15 RX ADMIN — TAMSULOSIN HYDROCHLORIDE 0.4 MG: 0.4 CAPSULE ORAL at 08:04

## 2025-04-15 RX ADMIN — LACTULOSE 20 G: 20 SOLUTION ORAL at 09:04

## 2025-04-15 RX ADMIN — MAGNESIUM 64 MG (MAGNESIUM CHLORIDE) TABLET,DELAYED RELEASE 64 MG: at 09:04

## 2025-04-15 RX ADMIN — MAGNESIUM 64 MG (MAGNESIUM CHLORIDE) TABLET,DELAYED RELEASE 64 MG: at 08:04

## 2025-04-15 RX ADMIN — POTASSIUM BICARBONATE 20 MEQ: 391 TABLET, EFFERVESCENT ORAL at 08:04

## 2025-04-15 RX ADMIN — Medication 100 MG: at 08:04

## 2025-04-15 RX ADMIN — MUPIROCIN: 20 OINTMENT TOPICAL at 09:04

## 2025-04-15 RX ADMIN — RIFAXIMIN 550 MG: 550 TABLET ORAL at 09:04

## 2025-04-15 RX ADMIN — Medication 6 MG: at 09:04

## 2025-04-15 RX ADMIN — SPIRONOLACTONE 25 MG: 25 TABLET ORAL at 10:04

## 2025-04-15 RX ADMIN — FAMOTIDINE 20 MG: 20 TABLET, FILM COATED ORAL at 08:04

## 2025-04-15 RX ADMIN — FUROSEMIDE 10 MG/HR: 10 INJECTION, SOLUTION INTRAMUSCULAR; INTRAVENOUS at 04:04

## 2025-04-15 NOTE — ASSESSMENT & PLAN NOTE
The likely etiology of thrombocytopenia is liver disease. The patients 3 most recent labs are listed below.  Recent Labs     04/12/25  1653 04/13/25  0550 04/15/25  0536   PLT 61* 78* 67*     Plan  - Will transfuse if platelet count is <20k.

## 2025-04-15 NOTE — ASSESSMENT & PLAN NOTE
2/2 to cirrhosis, diuresis on board, replete electrolytes, monitor urine output    4/14 FM:  Stopping midrodine today, possible related to volume retention.  4/15 FM:  Slowly improving, need to establish lowest tolerable dry weight.

## 2025-04-15 NOTE — PLAN OF CARE
Problem: Adult Inpatient Plan of Care  Goal: Plan of Care Review  Outcome: Progressing  Goal: Patient-Specific Goal (Individualized)  Outcome: Progressing  Goal: Absence of Hospital-Acquired Illness or Injury  Outcome: Progressing  Goal: Optimal Comfort and Wellbeing  Outcome: Progressing  Goal: Readiness for Transition of Care  Outcome: Progressing     Problem: Acute Kidney Injury/Impairment  Goal: Fluid and Electrolyte Balance  Outcome: Progressing  Goal: Improved Oral Intake  Outcome: Progressing  Goal: Effective Renal Function  Outcome: Progressing     Problem: Infection  Goal: Absence of Infection Signs and Symptoms  Outcome: Progressing     Problem: Wound  Goal: Optimal Coping  Outcome: Progressing  Goal: Optimal Functional Ability  Outcome: Progressing  Goal: Absence of Infection Signs and Symptoms  Outcome: Progressing  Goal: Improved Oral Intake  Outcome: Progressing  Goal: Optimal Pain Control and Function  Outcome: Progressing  Goal: Skin Health and Integrity  Outcome: Progressing

## 2025-04-15 NOTE — PROGRESS NOTES
Pharmacist Renal Dose Adjustment Note    Juan Carlos Yoo Sr. is a 69 y.o. male being treated with the medication Pepcid    Patient Data:    Vital Signs (Most Recent):  Temp: 98 °F (36.7 °C) (04/15/25 0802)  Pulse: 90 (04/15/25 1033)  Resp: 19 (04/15/25 0802)  BP: (!) 106/58 (04/15/25 0843)  SpO2: 98 % (04/15/25 0802) Vital Signs (72h Range):  Temp:  [97.7 °F (36.5 °C)-98.5 °F (36.9 °C)]   Pulse:  []   Resp:  [12-20]   BP: ()/(53-88)   SpO2:  [95 %-100 %]      Recent Labs   Lab 04/13/25  0550 04/13/25  2055 04/15/25  0536   CREATININE 1.9* 2.0* 2.2*     Serum creatinine: 2.2 mg/dL (H) 04/15/25 0536  Estimated creatinine clearance: 45.3 mL/min (A)    Medication:Pepcid dose: 20 mg frequency twice daily will be changed to medication:Pepcid dose:20 mg frequency:daily      Pharmacist's Name: Krystal Gates  Pharmacist's Extension: [phone]

## 2025-04-15 NOTE — ASSESSMENT & PLAN NOTE
Patient with known Cirrhosis with Child's class Calculator for Child's score link- [email or web address]-ocampo-score-cirrhosis-mortality#creator-insights. Co-morbidities are present and inclusive of esophageal varices, hepatic encephalopathy, and anemia/pancytopenia.  MELD-Na score calculated; MELD 3.0: 27 at 4/15/2025  5:36 AM  MELD-Na: 26 at 4/15/2025  5:36 AM  Calculated from:  Serum Creatinine: 2.2 mg/dL at 4/15/2025  5:36 AM  Serum Sodium: 135 mmol/L at 4/15/2025  5:36 AM  Total Bilirubin: 3.5 mg/dL at 4/15/2025  5:36 AM  Serum Albumin: 2 g/dL at 4/15/2025  5:36 AM  INR(ratio): 1.7 at 4/13/2025 12:42 PM  Age at listing (hypothetical): 69 years  Sex: Male at 4/15/2025  5:36 AM      Continue chronic meds. Etiology likely ETOH. Will avoid any hepatotoxic meds, and monitor CBC/CMP/INR for synthetic function.

## 2025-04-15 NOTE — SUBJECTIVE & OBJECTIVE
"Past Medical History:   Diagnosis Date    Atrial fibrillation     Bulging disc     Cirrhosis     Colon polyp 12/29/2017    Rpt 5 yrs    Encounter for blood transfusion     EV (esophageal varices)     Hypertension 03/30/2023    Renal disorder     Skin cancer 03/2017    'NOSE"    Thrombocytopenia        Past Surgical History:   Procedure Laterality Date    CATHETERIZATION OF BOTH LEFT AND RIGHT HEART N/A 02/08/2023    Procedure: CATHETERIZATION, HEART, BOTH LEFT AND RIGHT;  Surgeon: Flo Malone MD;  Location: [facility] CATH LAB;  Service: Cardiology;  Laterality: N/A;  low bleeding risk 1.0%    CHOLECYSTECTOMY      COLONOSCOPY      COLONOSCOPY N/A 12/29/2017    ta rpt 2022    CORONARY ANGIOGRAPHY N/A 02/08/2023    Procedure: ANGIOGRAM, CORONARY ARTERY;  Surgeon: Flo Malone MD;  Location: [facility] CATH LAB;  Service: Cardiology;  Laterality: N/A;    HERNIA REPAIR      INCISION AND DRAINAGE Right 02/27/2025    Procedure: Incision and Drainage, thigh;  Surgeon: Kayla Foster MD;  Location: [facility];  Service: General;  Laterality: Right;  Plan to go first if pt has cardiac clearance - SB    SKIN BIOPSY  03/2017    TONSILLECTOMY      UPPER GASTROINTESTINAL ENDOSCOPY  2011    EV    UPPER GASTROINTESTINAL ENDOSCOPY  2016    VASECTOMY         Review of patient's allergies indicates:  No Known Allergies    No current facility-administered medications on file prior to encounter.     Current Outpatient Medications on File Prior to Encounter   Medication Sig    famotidine (PEPCID) 20 MG tablet Take 1 tablet (20 mg total) by mouth once daily.    furosemide (LASIX) 80 MG tablet Take 1 tablet (80 mg total) by mouth 2 (two) times a day for 3 days, THEN 1 tablet (80 mg total) once daily.    lactulose (CHRONULAC) 20 gram/30 mL Soln Take 45 mLs (30 g total) by mouth 3 (three) times daily.    magnesium chloride (MAG 64) 64 mg TbEC Take 2 tablets (128 mg total) by mouth once daily.    metoprolol tartrate (LOPRESSOR) 25 MG tablet " TAKE 0.5 TABLETS BY MOUTH 2 TIMES DAILY.    midodrine (PROAMATINE) 5 MG Tab Take 3 tablets (15 mg total) by mouth 3 (three) times daily with meals.    potassium bicarbonate (K-LYTE) disintegrating tablet Take 1 tablet (25 mEq total) by mouth 2 (two) times a day.    spironolactone (ALDACTONE) 25 MG tablet Take 1 tablet (25 mg total) by mouth 2 (two) times daily.    tamsulosin (FLOMAX) 0.4 mg Cap Take 1 capsule (0.4 mg total) by mouth once daily.    thiamine 100 MG tablet Take 100 mg by mouth once daily.    apixaban (ELIQUIS) 5 mg Tab Take 5 mg by mouth 2 (two) times daily.     Family History       Problem Relation (Age of Onset)    Alzheimer's disease Mother    Cancer Maternal Uncle    Heart disease Maternal Uncle    Hyperlipidemia Brother    No Known Problems Father    Ovarian cancer Sister          Tobacco Use    Smoking status: Never     Passive exposure: Never    Smokeless tobacco: Never   Substance and Sexual Activity    Alcohol use: Not Currently     Alcohol/week: 0.0 standard drinks of alcohol    Drug use: No    Sexual activity: Not Currently     Review of Systems   Constitutional:  Positive for fatigue. Negative for chills and fever.   HENT:  Negative for congestion.    Eyes:  Negative for visual disturbance.   Respiratory:  Positive for shortness of breath. Negative for wheezing.    Cardiovascular:  Positive for leg swelling. Negative for chest pain and palpitations.   Gastrointestinal:  Positive for abdominal distention. Negative for abdominal pain, constipation, diarrhea, nausea and vomiting.   Endocrine: Negative for polyuria.   Genitourinary:  Positive for scrotal swelling (Scrotal edema). Negative for dysuria.   Musculoskeletal:  Positive for arthralgias. Negative for back pain and gait problem.   Skin:  Negative for wound.   Neurological:  Positive for weakness.   Psychiatric/Behavioral:  The patient is not nervous/anxious.      Objective:     Vital Signs (Most Recent):  Temp: 98 °F (36.7 °C) (04/15/25  0802)  Pulse: 87 (04/15/25 0802)  Resp: 19 (04/15/25 0802)  BP: (!) 106/58 (04/15/25 0802)  SpO2: 98 % (04/15/25 0802) Vital Signs (24h Range):  Temp:  [97.8 °F (36.6 °C)-98.1 °F (36.7 °C)] 98 °F (36.7 °C)  Pulse:  [] 87  Resp:  [19-20] 19  SpO2:  [95 %-98 %] 98 %  BP: ()/(53-60) 106/58     Weight: (!) 140.1 kg (308 lb 13.8 oz)  Body mass index is 40.75 kg/m².     Physical Exam  Vitals and nursing note reviewed.   Constitutional:       General: He is not in acute distress.     Appearance: He is well-developed. He is obese. He is ill-appearing. He is not diaphoretic.   HENT:      Head: Normocephalic and atraumatic.      Nose: No congestion or rhinorrhea.   Eyes:      General: Scleral icterus present.         Right eye: No discharge.         Left eye: No discharge.      Extraocular Movements: Extraocular movements intact.   Neck:      Thyroid: No thyromegaly.      Vascular: No JVD.   Cardiovascular:      Rate and Rhythm: Normal rate. Rhythm irregular.      Heart sounds: Normal heart sounds. No murmur heard.     No friction rub. No gallop.   Pulmonary:      Effort: No respiratory distress.      Breath sounds: No wheezing, rhonchi or rales.   Abdominal:      General: There is distension.      Tenderness: There is no abdominal tenderness. There is no guarding or rebound.      Hernia: No hernia is present.   Genitourinary:     Comments: Scrotal edema (massive)  Musculoskeletal:      Cervical back: Neck supple.      Right lower leg: Edema present.      Left lower leg: Edema present.   Neurological:      Mental Status: He is alert and oriented to person, place, and time. Mental status is at baseline.      Motor: Weakness present.   Psychiatric:         Behavior: Behavior normal.         Thought Content: Thought content normal.                Significant Labs: All pertinent labs within the past 24 hours have been reviewed.  Recent Lab Results         04/15/25  0536   04/14/25  1606        Albumin 2.0         ALP  78         ALT 9         Anion Gap 7         Ao root annulus   3.39       Ao peak rodney   1.3       Ao VTI   27.5       AST 26         AV valve area   2.2       TONY by Velocity Ratio   2.1       AORTIC VALVE CUSP SEPERATION   1.15       AV mean gradient   4       AV index (prosthetic)   0.64       AV peak gradient   7       AV Velocity Ratio   0.62       Baso # 0.06         Basophil % 1.3         BILIRUBIN TOTAL 3.5  Comment: For infants and newborns, interpretation of results should be based   on gestational age, weight and in agreement with clinical   observations.    Premature Infant recommended reference ranges:   0-24 hours:  <8.0 mg/dL   24-48 hours: <12.0 mg/dL   3-5 days:    <15.0 mg/dL   6-29 days:   <15.0 mg/dL         BSA   2.65       BUN 15         Calcium 8.5         Chloride 99         CO2 29         Creatinine 2.2         Left Ventricle Relative Wall Thickness   0.34       E/E' ratio   12       eGFR 31  Comment: Estimated GFR calculated using the CKD-EPI creatinine (2021) equation.         Eos # 0.15         Eos % 3.2         E wave deceleration time   208       FS   30.2       Glucose 93         Gran # (ANC) 2.73         Hematocrit 28.2         Hemoglobin 9.3         Immature Grans (Abs) 0.01  Comment: Mild elevation in immature granulocytes is non specific and can be seen in a variety of conditions including stress response, acute inflammation, trauma and pregnancy. Correlation with other laboratory and clinical findings is essential.         Immature Granulocytes 0.2         IVC diameter   3.29       IVSd   1.1       Left Atrium Major Axis   7.3       LA size   6.0       LVOT area   3.5       LV LATERAL E/E' RATIO   12.9       LV SEPTAL E/E' RATIO   10.9       LV EDV BP   135       LV Diastolic Volume Index   52.73       Left Ventricular End Diastolic Volume by Teichholz Method   134.91       Left Ventricular End Systolic Volume by Teichholz Method   57.57       LVIDd   5.3       LVIDs   3.7       LV  mass   200.4       LV Mass Index   78.3       Left Ventricular Outflow Tract Mean Gradient   1.46       Left Ventricular Outflow Tract Mean Velocity   0.57       LVOT diameter   2.1       LVOT peak owen   0.8       LVOT stroke volume   60.9       LVOT peak VTI   17.6       LV ESV BP   58       LV Systolic Volume Index   22.7       Lymph # 0.90         Lymph % 19.4         Magnesium  1.4         MCH 31.8         MCHC 33.0         MCV 97         Mean e'   0.12       Mono # 0.78         Mono % 16.8         MPV 11.9         Mr max owen   4.58       MV valve area p 1/2 method   3.64       MV Peak E Owen   1.42       MV stenosis pressure 1/2 time   60.45       Neut % 59.1         nRBC 0         Platelet Count 67         Potassium 3.4         PROTEIN TOTAL 6.3         PW   0.9       RA Major Axis   6.50       Est. RA pres   15       RBC 2.92         RDW 15.9         RV TB RVSP   18       RV/LV Ratio   0.68       RVDD   3.60       RV- wilson basal diam   3.6       RVOT peak owen   0.58       Sodium 135         TAPSE   1.80       TDI SEPTAL   0.13       TDI LATERAL   0.11       Triscuspid Valve Regurgitation Peak Gradient   41       TR Max Owen   3.2       TV resting pulmonary artery pressure   56       WBC 4.63         ZLVIDD   -11.30       ZLVIDS   -7.60               Significant Imaging: I have reviewed all pertinent imaging results/findings within the past 24 hours.

## 2025-04-15 NOTE — ASSESSMENT & PLAN NOTE
Patient with known Cirrhosis with Child's class Calculator for Child's score link- [email or web address]://www.[employer].com/[email or web address]/child-ocampo-score-cirrhosis-mortality#creator-insights. Co-morbidities are present and inclusive of ascites and anemia/pancytopenia.  MELD-Na score calculated; MELD 3.0: 27 at 4/15/2025  5:36 AM  MELD-Na: 26 at 4/15/2025  5:36 AM  Calculated from:  Serum Creatinine: 2.2 mg/dL at 4/15/2025  5:36 AM  Serum Sodium: 135 mmol/L at 4/15/2025  5:36 AM  Total Bilirubin: 3.5 mg/dL at 4/15/2025  5:36 AM  Serum Albumin: 2 g/dL at 4/15/2025  5:36 AM  INR(ratio): 1.7 at 4/13/2025 12:42 PM  Age at listing (hypothetical): 69 years  Sex: Male at 4/15/2025  5:36 AM      Continue chronic meds. Etiology likely ETOH. Will avoid any hepatotoxic meds, and monitor CBC/CMP/INR for synthetic function.   Patient reported     4/15 FM:  DC above, stopping midrodine.

## 2025-04-15 NOTE — PT/OT/SLP PROGRESS
Physical Therapy      Patient Name:  Juan Carlos Yoo    MRN:  [id number]    Patient not seen today secondary to Patient unwilling to participate. Patient reports his scrotum is hurting too much for treatment today. Will follow-up as appropriate.

## 2025-04-15 NOTE — PROGRESS NOTES
[facility]  Cardiology  Progress Note    Patient Name: Juan Carlos Yoo Sr.  MRN: [id number]  Admission Date: 4/12/2025  Hospital Length of Stay: 1 days  Code Status: Full Code   Attending Physician: Joao Villegas III, MD   Primary Care Physician: Joao Villegas III, MD  Expected Discharge Date:   Principal Problem:Decompensated cirrhosis    Subjective:     Hospital Course:       Patient is a 68 yo male with a history of chronic atrial fibrillation, chronic diastolic heart failure, cirrhosis, and prior need for dialysis presenting with worsening bilateral lower extremity edema and scrotal swelling. Patient reports waking up recently with increased swelling that progressively worsened, particularly affecting his genitals, causing significant discomfort with ambulation. He presented to the ED yesterday and was admitted. He reports compliance with his medications including Lasix and spironolactone, though occasionally taking doses 1-2 hours late. He endorses mild intermittent exertional dyspnea, notably while walking to his truck, but denies orthopnea. Denies fever. Patient has a dialysis port scheduled for removal this Wednesday, with pre-op clearance obtained from cardiology last Friday. Recent echocardiogram in December showed preserved cardiac function with known atrial fibrillation. EKG from the 21st confirmed chronic A-fib. Patient denies palpitations or awareness of irregular rhythm. 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 Denies history of smoking or illicit drug use. No prior cardiac stents.    Interval History:     4/14/25: Patient reports improved shortness of breath. He reports stable scrotal and bilateral lower extremity edema. Telemetry this morning shows atrial fibrillation with a controlled rate. -156 mL urine output since yesterday. Hemodynamics well controlled overnight. Electrolytes stable this morning.  Renal function worsening since admission.     4/15/25:  Patient reports improved scrotal edema and bilateral lower extremity edema. He denies shortness of breath and chest pain. Telemetry this morning shows atrial fibrillation with a heart rate in the 80s- 90s. -2450 mL urine output recorded since yesterday. Hemodynamics well controlled overnight. Electrolytes stable. Renal function continuing to worsen with a creatinine of 2.2 and GFR of 31. Liver function stable.     Review of Systems   Constitutional: Negative for chills and fever.   HENT: Negative.     Cardiovascular:  Positive for leg swelling. Negative for chest pain and palpitations.   Respiratory:  Positive for shortness of breath. Negative for cough and wheezing.    Gastrointestinal:  Positive for bloating. Negative for abdominal pain, nausea and vomiting.   Genitourinary:         Positive for scrotal edema     Objective:     Vital Signs (Most Recent):  Temp: 98 °F (36.7 °C) (04/15/25 0802)  Pulse: 91 (04/15/25 1446)  Resp: 19 (04/15/25 0802)  BP: (!) 106/58 (04/15/25 0843)  SpO2: 98 % (04/15/25 0802) Vital Signs (24h Range):  Temp:  [97.8 °F (36.6 °C)-98.1 °F (36.7 °C)] 98 °F (36.7 °C)  Pulse:  [] 91  Resp:  [19-20] 19  SpO2:  [95 %-98 %] 98 %  BP: ()/(53-60) 106/58     Weight: (!) 140.1 kg (308 lb 13.8 oz)  Body mass index is 40.75 kg/m².    SpO2: 98 %         Intake/Output Summary (Last 24 hours) at 4/15/2025 1650  Last data filed at 4/15/2025 0631  Gross per 24 hour   Intake --   Output 2450 ml   Net -2450 ml       Lines/Drains/Airways       Central Venous Catheter Line  Duration                  Hemodialysis Catheter 12/16/24 1152 right internal jugular 120 days              Drain  Duration                  Open Drain 02/27/25 0815 Tube - 1 Right Thigh Penrose 1/4 inch 47 days              Peripheral Intravenous Line  Duration                  Peripheral IV - Single Lumen 04/15/25 0315 20 G No Anterior;Left Wrist <1 day               "      Physical Exam  Constitutional:       General: He is not in acute distress.     Appearance: He is obese.   HENT:      Head: Normocephalic and atraumatic.   Eyes:      Extraocular Movements: Extraocular movements intact.   Cardiovascular:      Pulses: Normal pulses.      Heart sounds: Normal heart sounds.   Pulmonary:      Effort: Pulmonary effort is normal.      Breath sounds: Normal breath sounds.   Abdominal:      General: There is distension.   Musculoskeletal:      Cervical back: Normal range of motion and neck supple.      Right lower leg: Edema present.      Left lower leg: Edema present.   Skin:     General: Skin is warm and dry.      Capillary Refill: Capillary refill takes less than 2 seconds.   Neurological:      Mental Status: He is alert. Mental status is at baseline.   Psychiatric:         Mood and Affect: Mood normal.         Behavior: Behavior normal.         Significant Labs: BMP:   Recent Labs   Lab 04/13/25  2055 04/15/25  0536   * 135*   K 3.6 3.4*    99   CO2 27 29   BUN 13 15   CREATININE 2.0* 2.2*   CALCIUM 8.3* 8.5*   MG  --  1.4*   , CMP   Recent Labs   Lab 04/13/25  2055 04/15/25  0536   * 135*   K 3.6 3.4*    99   CO2 27 29   BUN 13 15   CREATININE 2.0* 2.2*   CALCIUM 8.3* 8.5*   ALBUMIN  --  2.0*   BILITOT  --  3.5*   ALKPHOS  --  78   AST  --  26   ALT  --  9*   ANIONGAP 6* 7*   , CBC   Recent Labs   Lab 04/15/25  0536   WBC 4.63   HGB 9.3*   HCT 28.2*   PLT 67*   , Lipid Panel No results for input(s): "CHOL", "HDL", "LDLCALC", "TRIG", "CHOLHDL" in the last 48 hours., Troponin No results for input(s): "TROPONINIHS" in the last 48 hours., and All pertinent lab results from the last 24 hours have been reviewed.    Significant Imaging:     Echocardiogram: Transthoracic echo (TTE) complete (Cupid Only):   Results for orders placed or performed during the hospital encounter of 04/12/25   Echo   Result Value Ref Range    BSA 2.65 m2    LVOT stroke volume 60.9 cm3    " LVIDd 5.3 3.5 - 6.0 cm    LV Systolic Volume 58 mL    LV Systolic Volume Index 22.7 mL/m2    LVIDs 3.7 2.1 - 4.0 cm    LV Diastolic Volume 135 mL    LV Diastolic Volume Index 52.73 mL/m2    Left Ventricular End Systolic Volume by Teichholz Method 57.57 mL    Left Ventricular End Diastolic Volume by Teichholz Method 134.91 mL    IVS 1.1 0.6 - 1.1 cm    LVOT diameter 2.1 cm    LVOT area 3.5 cm2    FS 30.2 28 - 44 %    Left Ventricle Relative Wall Thickness 0.34 cm    PW 0.9 0.6 - 1.1 cm    LV mass 200.4 g    LV Mass Index 78.3 g/m2    MV Peak E Owen 1.42 m/s    TDI LATERAL 0.11 m/s    TDI SEPTAL 0.13 m/s    E/E' ratio 12 m/s    TR Max Owen 3.2 m/s    E wave deceleration time 208 msec    LV SEPTAL E/E' RATIO 10.9 m/s    LV LATERAL E/E' RATIO 12.9 m/s    LVOT peak owen 0.8 m/s    Left Ventricular Outflow Tract Mean Velocity 0.57 cm/s    Left Ventricular Outflow Tract Mean Gradient 1.46 mmHg    RV- wilson basal diam 3.6 cm    RV/LV Ratio 0.68 cm    LA size 6.0 cm    Left Atrium Major Axis 7.3 cm    RA Major Axis 6.50 cm    AV mean gradient 4 mmHg    AV peak gradient 7 mmHg    Ao peak owen 1.3 m/s    Ao VTI 27.5 cm    LVOT peak VTI 17.6 cm    AV valve area 2.2 cm²    AV Velocity Ratio 0.62     AV index (prosthetic) 0.64     TONY by Velocity Ratio 2.1 cm²    Mr max owen 4.58 m/s    MV stenosis pressure 1/2 time 60.45 ms    MV valve area p 1/2 method 3.64 cm2    Triscuspid Valve Regurgitation Peak Gradient 41 mmHg    RVOT peak owen 0.58 m/s    Ao root annulus 3.39 cm    IVC diameter 3.29 cm    Mean e' 0.12 m/s    ZLVIDS -7.60     ZLVIDD -11.30     RVDD 3.60 cm    AORTIC VALVE CUSP SEPERATION 1.15 cm    TAPSE 1.80 cm    TV resting pulmonary artery pressure 56 mmHg    RV TB RVSP 18 mmHg    Est. RA pres 15 mmHg    Narrative      Left Ventricle: The left ventricle is normal in size. Mildly increased   wall thickness. There is normal systolic function with a visually   estimated ejection fraction of 55 - 60%. Unable to assess diastolic    function due to atrial fibrillation.    Right Ventricle: The right ventricle has moderate enlargement. Systolic   function is normal.    Left Atrium: Severely dilated    Right Atrium: Right atrium is moderately dilated.    Aortic Valve: The aortic valve is a trileaflet valve. There is mild   aortic valve sclerosis.    Mitral Valve: There is mild regurgitation.    Tricuspid Valve: There is mild to moderate regurgitation.    IVC/SVC: Elevated venous pressure at 15 mmHg.    Left pleural effusion.          Assessment and Plan:    Acute exacerbation of HFpEF: Patient presented to Emergency Department with SOB, scrotal edema, and BLE edema. He was started on IV furosemide. This morning, he reports improving SOB. He also reports improving scrotal and BLE edema; however, not at baseline.             - Continue IV Lasix as dosed            - Continue spironolactone as dosed            - Monitor daily I's & O's            - Low Na+ diet            - Recommend nephrology consult given CKD            - Echocardiogram shows normal LV systolic function with an EF of 55- 60%. It also shows mild MR and moderate TR.      Atrial Fibrillation: Patient with history of atrial fibrillation. Telemetry this morning continues to show atrial fibrillation with a controlled rate. He is not currently on anticoagulation given cirrhosis.            - Continue metoprolol tartrate as dosed           - Continue telemetry           - Agree with no anticoagulation given cirrhosis     Cirrhosis: Management per primary team          Active Diagnoses:    Diagnosis Date Noted POA    PRINCIPAL PROBLEM:  Decompensated cirrhosis [K72.90, K74.60] 10/04/2024 Yes    Scrotal edema [N50.89] 04/13/2025 Yes    Anasarca [R60.1] 04/13/2025 Yes    Thrombocytopenia [D69.6] 11/21/2024 Yes    Hepatic encephalopathy [K76.82] 11/21/2024 Yes    CKD (chronic kidney disease) stage 4, GFR 15-29 ml/min [N18.4] 11/20/2024 Yes    Longstanding persistent atrial fibrillation  [I48.11] 11/29/2022 Yes    Cirrhosis [K74.60] 07/24/2014 Yes      Problems Resolved During this Admission:       VTE Risk Mitigation (From admission, onward)           Ordered     IP VTE HIGH RISK PATIENT  Once         04/12/25 2327     Place sequential compression device  Until discontinued         04/12/25 2327     Place ANDREW hose  Until discontinued         04/12/25 2327                  Provider's Attestation:  I, Luis Liu MD, confirm that OCTAVIA Starr worked under my direction at all times.  Documentation shall reflect findings and decisions made by myself in the course of the patient's treatment.  I have performed a face to face evaluation of the patient and reviewed the medical record. In addition, I have performed the substantive portion of the medical decision making. I have reviewed the notes and assessment, and I concur with her documentation.  CMS guidelines regarding the use of scribes shall be adhered to.  MD Luis Lewis MD  Cardiology  [facility]

## 2025-04-15 NOTE — PT/OT/SLP PROGRESS
Physical Therapy      Patient Name:  Juan Carlos Yoo .   MRN:  [id number]    Patient not seen today  (second attempt for today, second day of  consecutive refusal) due  to patient is unwilling to participate in therapy despite encouragement.  He states that he does not feel like moving at this time,  Attending nurse made aware. . Will follow-up as appropriate. .

## 2025-04-15 NOTE — ASSESSMENT & PLAN NOTE
4/14 FM:  Not much change overnight, cont IV lasix drip.  4/15 FM:  Improving slowly, echo noted.

## 2025-04-15 NOTE — ASSESSMENT & PLAN NOTE
Patient has persistent (7 days or more) atrial fibrillation. Patient is currently in atrial fibrillation. [id number] Score: 1. The patients heart rate in the last 24 hours is as follows:  Pulse  Min: 66  Max: 108     Antiarrhythmics  metoprolol tartrate (LOPRESSOR) tablet 50 mg, 2 times daily, Oral    Anticoagulants       Plan  - Replete lytes with a goal of K>4, Mg >2  - Patient is not anticoagulated due to thrombocytopenia   - Patient's afib is currently uncontrolled. Will adjust treatment as follows: adjust lopressor dose  - cardiology consulted    4/14 FM:  No anticoags 2nd CLD, Plt Count.  4/15 FM:  Cont. Txt plan.

## 2025-04-15 NOTE — ASSESSMENT & PLAN NOTE
Creatine stable for now. BMP reviewed- noted Estimated Creatinine Clearance: 45.3 mL/min (A) (based on SCr of 2.2 mg/dL (H)). according to latest data. Based on current GFR, CKD stage is stage 4 - GFR 15-29.  Monitor UOP and serial BMP and adjust therapy as needed. Renally dose meds. Avoid nephrotoxic medications and procedures.    4/14 FM:  Will not DC tunneled cath just yet.  4/15 FM:  Patient is responding to lasix drip.

## 2025-04-16 LAB
ABSOLUTE EOSINOPHIL (OHS): 0.13 K/UL
ABSOLUTE MONOCYTE (OHS): 0.57 K/UL (ref 0.3–1)
ABSOLUTE NEUTROPHIL COUNT (OHS): 2.72 K/UL (ref 1.8–7.7)
ALBUMIN SERPL BCP-MCNC: 1.9 G/DL (ref 3.5–5.2)
ALP SERPL-CCNC: 74 UNIT/L (ref 40–150)
ALT SERPL W/O P-5'-P-CCNC: 9 UNIT/L (ref 10–44)
ANION GAP (OHS): 8 MMOL/L (ref 8–16)
AST SERPL-CCNC: 28 UNIT/L (ref 11–45)
BASOPHILS # BLD AUTO: 0.07 K/UL
BASOPHILS NFR BLD AUTO: 1.6 %
BILIRUB SERPL-MCNC: 3.3 MG/DL (ref 0.1–1)
BUN SERPL-MCNC: 16 MG/DL (ref 8–23)
CALCIUM SERPL-MCNC: 8.4 MG/DL (ref 8.7–10.5)
CHLORIDE SERPL-SCNC: 98 MMOL/L (ref 95–110)
CO2 SERPL-SCNC: 29 MMOL/L (ref 23–29)
CREAT SERPL-MCNC: 2 MG/DL (ref 0.5–1.4)
ERYTHROCYTE [DISTWIDTH] IN BLOOD BY AUTOMATED COUNT: 15.8 % (ref 11.5–14.5)
GFR SERPLBLD CREATININE-BSD FMLA CKD-EPI: 35 ML/MIN/1.73/M2
GLUCOSE SERPL-MCNC: 127 MG/DL (ref 70–110)
HCT VFR BLD AUTO: 27.6 % (ref 40–54)
HGB BLD-MCNC: 9 GM/DL (ref 14–18)
IMM GRANULOCYTES # BLD AUTO: 0.02 K/UL (ref 0–0.04)
IMM GRANULOCYTES NFR BLD AUTO: 0.4 % (ref 0–0.5)
LYMPHOCYTES # BLD AUTO: 0.99 K/UL (ref 1–4.8)
MAGNESIUM SERPL-MCNC: 1.4 MG/DL (ref 1.6–2.6)
MCH RBC QN AUTO: 31.4 PG (ref 27–31)
MCHC RBC AUTO-ENTMCNC: 32.6 G/DL (ref 32–36)
MCV RBC AUTO: 96 FL (ref 82–98)
NUCLEATED RBC (/100WBC) (OHS): 0 /100 WBC
PLATELET # BLD AUTO: 73 K/UL (ref 150–450)
PMV BLD AUTO: 11 FL (ref 9.2–12.9)
POTASSIUM SERPL-SCNC: 3.4 MMOL/L (ref 3.5–5.1)
PROT SERPL-MCNC: 6.1 GM/DL (ref 6–8.4)
RBC # BLD AUTO: 2.87 M/UL (ref 4.6–6.2)
RELATIVE EOSINOPHIL (OHS): 2.9 %
RELATIVE LYMPHOCYTE (OHS): 22 % (ref 18–48)
RELATIVE MONOCYTE (OHS): 12.7 % (ref 4–15)
RELATIVE NEUTROPHIL (OHS): 60.4 % (ref 38–73)
SODIUM SERPL-SCNC: 135 MMOL/L (ref 136–145)
WBC # BLD AUTO: 4.5 K/UL (ref 3.9–12.7)

## 2025-04-16 PROCEDURE — 63600175 PHARM REV CODE 636 W HCPCS: Performed by: INTERNAL MEDICINE

## 2025-04-16 PROCEDURE — 25000003 PHARM REV CODE 250: Performed by: INTERNAL MEDICINE

## 2025-04-16 PROCEDURE — 36415 COLL VENOUS BLD VENIPUNCTURE: CPT | Performed by: INTERNAL MEDICINE

## 2025-04-16 PROCEDURE — 97161 PT EVAL LOW COMPLEX 20 MIN: CPT

## 2025-04-16 PROCEDURE — 80053 COMPREHEN METABOLIC PANEL: CPT | Performed by: INTERNAL MEDICINE

## 2025-04-16 PROCEDURE — 21400001 HC TELEMETRY ROOM

## 2025-04-16 PROCEDURE — 83735 ASSAY OF MAGNESIUM: CPT | Performed by: INTERNAL MEDICINE

## 2025-04-16 PROCEDURE — 63600175 PHARM REV CODE 636 W HCPCS: Performed by: EMERGENCY MEDICINE

## 2025-04-16 PROCEDURE — 25000003 PHARM REV CODE 250: Performed by: EMERGENCY MEDICINE

## 2025-04-16 PROCEDURE — 85025 COMPLETE CBC W/AUTO DIFF WBC: CPT | Performed by: INTERNAL MEDICINE

## 2025-04-16 RX ORDER — MAGNESIUM SULFATE 1 G/100ML
1 INJECTION INTRAVENOUS ONCE
Status: COMPLETED | OUTPATIENT
Start: 2025-04-16 | End: 2025-04-16

## 2025-04-16 RX ORDER — MAGNESIUM SULFATE HEPTAHYDRATE 40 MG/ML
2 INJECTION, SOLUTION INTRAVENOUS ONCE
Status: COMPLETED | OUTPATIENT
Start: 2025-04-16 | End: 2025-04-16

## 2025-04-16 RX ADMIN — MAGNESIUM 64 MG (MAGNESIUM CHLORIDE) TABLET,DELAYED RELEASE 64 MG: at 08:04

## 2025-04-16 RX ADMIN — LACTULOSE 20 G: 20 SOLUTION ORAL at 09:04

## 2025-04-16 RX ADMIN — MAGNESIUM SULFATE HEPTAHYDRATE 2 G: 40 INJECTION, SOLUTION INTRAVENOUS at 11:04

## 2025-04-16 RX ADMIN — METOPROLOL TARTRATE 50 MG: 50 TABLET, FILM COATED ORAL at 09:04

## 2025-04-16 RX ADMIN — MUPIROCIN: 20 OINTMENT TOPICAL at 08:04

## 2025-04-16 RX ADMIN — SPIRONOLACTONE 25 MG: 25 TABLET ORAL at 09:04

## 2025-04-16 RX ADMIN — POTASSIUM BICARBONATE 20 MEQ: 391 TABLET, EFFERVESCENT ORAL at 08:04

## 2025-04-16 RX ADMIN — FUROSEMIDE 10 MG/HR: 10 INJECTION, SOLUTION INTRAMUSCULAR; INTRAVENOUS at 05:04

## 2025-04-16 RX ADMIN — MUPIROCIN: 20 OINTMENT TOPICAL at 09:04

## 2025-04-16 RX ADMIN — MAGNESIUM 64 MG (MAGNESIUM CHLORIDE) TABLET,DELAYED RELEASE 64 MG: at 09:04

## 2025-04-16 RX ADMIN — Medication 6 MG: at 08:04

## 2025-04-16 RX ADMIN — RIFAXIMIN 550 MG: 550 TABLET ORAL at 09:04

## 2025-04-16 RX ADMIN — Medication 100 MG: at 09:04

## 2025-04-16 RX ADMIN — POTASSIUM BICARBONATE 20 MEQ: 391 TABLET, EFFERVESCENT ORAL at 09:04

## 2025-04-16 RX ADMIN — SPIRONOLACTONE 25 MG: 25 TABLET ORAL at 08:04

## 2025-04-16 RX ADMIN — RIFAXIMIN 550 MG: 550 TABLET ORAL at 08:04

## 2025-04-16 RX ADMIN — LACTULOSE 20 G: 20 SOLUTION ORAL at 08:04

## 2025-04-16 RX ADMIN — FAMOTIDINE 20 MG: 20 TABLET, FILM COATED ORAL at 09:04

## 2025-04-16 RX ADMIN — MAGNESIUM SULFATE IN DEXTROSE 1 G: 10 INJECTION, SOLUTION INTRAVENOUS at 01:04

## 2025-04-16 RX ADMIN — TAMSULOSIN HYDROCHLORIDE 0.4 MG: 0.4 CAPSULE ORAL at 09:04

## 2025-04-16 NOTE — PT/OT/SLP EVAL
Physical Therapy Evaluation    Patient Name:  Juan Carlos Yoo Sr.   MRN:  [id number]    Recommendations:     Discharge Recommendations: No Therapy Indicated   Discharge Equipment Recommendations: none   Barriers to discharge:  Current medical status    Assessment:     Juan Carlos Yoo Sr. is a 69 y.o. male admitted with a medical diagnosis of Decompensated cirrhosis.  He presents with the following impairments/functional limitations:   Patient is demonstrating no functional deficits with bed mobility, transfers, or ambulation today. He is able to perform all independently with no deficits noted        Plan:     During this hospitalization, patient does not require acute physical therapy as he is at his baseline with functional mobility. Please consult if this status changes.     Evaluation only    Subjective     Chief Complaint: scrotal pain and swelling  Patient/Family Comments/goals: return home  Pain/Comfort:  Pain Rating 1:  (Patient reports scrotal pain this visit but does not quantify)    Patients cultural, spiritual, Congregational conflicts given the current situation: no    Living Environment:  Lives with son in mobile home with ramp  Prior to admission, patients level of function was Independent.  Equipment used at home: none.  DME owned (not currently used): none.  Upon discharge, patient will have assistance from son.    Objective:     Communicated with nursing prior to session.  Patient found HOB elevated with peripheral IV, PureWick  upon PT entry to room.    General Precautions: Standard,    Orthopedic Precautions:N/A   Braces: N/A  Respiratory Status: Room air    Exams:  Cognitive Exam:  Patient is oriented to Person, Place, Time, and Situation  Gross Motor Coordination:  WFL  RLE ROM: WFL  RLE Strength: WFL  LLE ROM: WFL  LLE Strength: WFL    Functional Mobility:  Bed Mobility:     Rolling Left:  independence  Rolling Right: independence  Scooting: independence  Bridging: independence  Supine to Sit:  "independence  Sit to Supine: independence  Transfers:     Sit to Stand:  independence with no AD  Gait: 20 feet with no AD and no LOB noted- patient has been ambulating while in [facility] with no reported deficits  Balance: no LOB noted this visit      AM-PAC 6 CLICK MOBILITY  Total Score:23       Treatment & Education:  Patient educated on safety and need for continued mobility while in [facility] acute care in order to avoid any atrophy    Patient left HOB elevated with all lines intact, call button in reach, and nursing notified.    GOALS:   Multidisciplinary Problems       Physical Therapy Goals       Not on file                    History:     Past Medical History:   Diagnosis Date    Atrial fibrillation     Bulging disc     Cirrhosis     Colon polyp 12/29/2017    Rpt 5 yrs    Encounter for blood transfusion     EV (esophageal varices)     Hypertension 03/30/2023    Renal disorder     Skin cancer 03/2017    'NOSE"    Thrombocytopenia        Past Surgical History:   Procedure Laterality Date    CATHETERIZATION OF BOTH LEFT AND RIGHT HEART N/A 02/08/2023    Procedure: CATHETERIZATION, HEART, BOTH LEFT AND RIGHT;  Surgeon: Flo Malone MD;  Location: [facility] CATH LAB;  Service: Cardiology;  Laterality: N/A;  low bleeding risk 1.0%    CHOLECYSTECTOMY      COLONOSCOPY      COLONOSCOPY N/A 12/29/2017    ta rpt 2022    CORONARY ANGIOGRAPHY N/A 02/08/2023    Procedure: ANGIOGRAM, CORONARY ARTERY;  Surgeon: Flo Malone MD;  Location: [facility] CATH LAB;  Service: Cardiology;  Laterality: N/A;    HERNIA REPAIR      INCISION AND DRAINAGE Right 02/27/2025    Procedure: Incision and Drainage, thigh;  Surgeon: Kayla Foster MD;  Location: [facility] OR;  Service: General;  Laterality: Right;  Plan to go first if pt has cardiac clearance - SB    SKIN BIOPSY  03/2017    TONSILLECTOMY      UPPER GASTROINTESTINAL ENDOSCOPY  2011    EV    UPPER GASTROINTESTINAL ENDOSCOPY  2016    VASECTOMY         Time Tracking:     PT Received On: " 04/16/25  PT Start Time: 1050     PT Stop Time: 1104  PT Total Time (min): 14 min     Billable Minutes: Evaluation        04/16/2025

## 2025-04-16 NOTE — ASSESSMENT & PLAN NOTE
Creatine stable for now. BMP reviewed- noted Estimated Creatinine Clearance: 49.9 mL/min (A) (based on SCr of 2 mg/dL (H)). according to latest data. Based on current GFR, CKD stage is stage 4 - GFR 15-29.  Monitor UOP and serial BMP and adjust therapy as needed. Renally dose meds. Avoid nephrotoxic medications and procedures.    4/14 FM:  Will not DC tunneled cath just yet.  4/15 FM:  Patient is responding to lasix drip.  4/16 FM:  Slowly improving.

## 2025-04-16 NOTE — PROGRESS NOTES
[facility]  Cardiology  Progress Note    Patient Name: Juan Carlos Yoo Sr.  MRN: [id number]  Admission Date: 4/12/2025  Hospital Length of Stay: 9 days  Code Status: Full Code   Attending Physician: Joao Villegas III, MD   Primary Care Physician: Joao Villegas III, MD  Expected Discharge Date:   Principal Problem:Decompensated cirrhosis    Subjective:     Hospital Course:       Patient is a 68 yo male with a history of chronic atrial fibrillation, chronic diastolic heart failure, cirrhosis, and prior need for dialysis presenting with worsening bilateral lower extremity edema and scrotal swelling. Patient reports waking up recently with increased swelling that progressively worsened, particularly affecting his genitals, causing significant discomfort with ambulation. He presented to the ED yesterday and was admitted. He reports compliance with his medications including Lasix and spironolactone, though occasionally taking doses 1-2 hours late. He endorses mild intermittent exertional dyspnea, notably while walking to his truck, but denies orthopnea. Denies fever. Patient has a dialysis port scheduled for removal this Wednesday, with pre-op clearance obtained from cardiology last Friday. Recent echocardiogram in December showed preserved cardiac function with known atrial fibrillation. EKG from the 21st confirmed chronic A-fib. Patient denies palpitations or awareness of irregular rhythm. 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 Denies history of smoking or illicit drug use. No prior cardiac stents.    Interval History:     4/14/25: Patient reports improved shortness of breath. He reports stable scrotal and bilateral lower extremity edema. Telemetry this morning shows atrial fibrillation with a controlled rate. -156 mL urine output since yesterday. Hemodynamics well controlled overnight. Electrolytes stable this morning.  Renal function worsening since admission.     4/15/25:  Patient reports improved scrotal edema and bilateral lower extremity edema. He denies shortness of breath and chest pain. Telemetry this morning shows atrial fibrillation with a heart rate in the 80s- 90s. -2450 mL urine output recorded since yesterday. Hemodynamics well controlled overnight. Electrolytes stable. Renal function continuing to worsen with a creatinine of 2.2 and GFR of 31. Liver function stable.     4/16/25: Patient reports improved scrotal and bilateral lower extremity edema. He denies chest pain and shortness of breath. Telemetry this morning shows atrial fibrillation with a heart rate varying from 90s- 120s.  -1900 mL urine output since yesterday. Hypotension noted this morning with a blood pressure of 99/50. Improving kidney function.     Review of Systems   Constitutional: Negative for chills and fever.   HENT: Negative.     Cardiovascular:  Positive for leg swelling. Negative for chest pain and palpitations.   Respiratory:  Negative for cough, shortness of breath and wheezing.    Gastrointestinal:  Negative for bloating, abdominal pain, nausea and vomiting.   Genitourinary:         Positive for scrotal edema     Objective:     Vital Signs (Most Recent):  Temp: 98 °F (36.7 °C) (04/23/25 1113)  Pulse: 89 (04/23/25 1113)  Resp: 18 (04/23/25 1113)  BP: (!) 111/56 (04/23/25 1113)  SpO2: 96 % (04/23/25 1113) Vital Signs (24h Range):  Temp:  [97.8 °F (36.6 °C)-98.4 °F (36.9 °C)] 98 °F (36.7 °C)  Pulse:  [77-91] 89  Resp:  [16-18] 18  SpO2:  [91 %-99 %] 96 %  BP: ()/(51-63) 111/56     Weight: 126 kg (277 lb 12.8 oz)  Body mass index is 36.65 kg/m².    SpO2: 96 %         Intake/Output Summary (Last 24 hours) at 4/23/2025 1540  Last data filed at 4/23/2025 0715  Gross per 24 hour   Intake 240 ml   Output 4150 ml   Net -3910 ml       Lines/Drains/Airways       Central Venous Catheter Line  Duration                  Hemodialysis Catheter 12/16/24  "1152 right internal jugular 128 days              Drain  Duration                  Open Drain 02/27/25 0815 Tube - 1 Right Thigh Penrose 1/4 inch 55 days              Peripheral Intravenous Line  Duration                  Peripheral IV - Single Lumen 04/20/25 0030 20 G Anterior;Left Forearm 3 days                    Physical Exam  Constitutional:       General: He is not in acute distress.     Appearance: He is obese.   HENT:      Head: Normocephalic and atraumatic.   Eyes:      Extraocular Movements: Extraocular movements intact.   Neck:      Comments: No JVD  Cardiovascular:      Rate and Rhythm: Tachycardia present. Rhythm irregular.      Pulses: Normal pulses.      Heart sounds: Normal heart sounds.   Pulmonary:      Effort: Pulmonary effort is normal.      Breath sounds: Normal breath sounds.   Abdominal:      General: There is distension.   Musculoskeletal:      Cervical back: Normal range of motion and neck supple.      Right lower leg: Edema present.      Left lower leg: Edema present.   Skin:     General: Skin is warm and dry.      Capillary Refill: Capillary refill takes less than 2 seconds.   Neurological:      Mental Status: He is alert. Mental status is at baseline.   Psychiatric:         Mood and Affect: Mood normal.         Behavior: Behavior normal.         Significant Labs: BMP:   Recent Labs   Lab 04/22/25  0645 04/23/25  0546   * 134*   K 3.2* 3.7   CL 90* 88*   CO2 34* 38*   BUN 26* 28*   CREATININE 2.0* 1.9*   CALCIUM 8.7 8.8   MG 1.5* 1.4*   , CMP   Recent Labs   Lab 04/22/25  0645 04/23/25  0546   * 134*   K 3.2* 3.7   CL 90* 88*   CO2 34* 38*   BUN 26* 28*   CREATININE 2.0* 1.9*   CALCIUM 8.7 8.8   ALBUMIN 1.9* 1.9*   BILITOT 3.3* 3.0*   ALKPHOS 68 73   AST 28 28   ALT 9* 8*   ANIONGAP 9 8   , CBC   Recent Labs   Lab 04/22/25  0645 04/23/25  0546   WBC 5.18 5.44   HGB 9.8* 9.9*   HCT 29.6* 29.9*   PLT 79* 79*   , Lipid Panel No results for input(s): "CHOL", "HDL", "LDLCALC", " ""TRIG", "CHOLHDL" in the last 48 hours., Troponin No results for input(s): "TROPONINIHS" in the last 48 hours., and All pertinent lab results from the last 24 hours have been reviewed.    Significant Imaging:     Echocardiogram: Transthoracic echo (TTE) complete (Cupid Only):   Results for orders placed or performed during the hospital encounter of 04/12/25   Echo   Result Value Ref Range    BSA 2.65 m2    LVOT stroke volume 60.9 cm3    LVIDd 5.3 3.5 - 6.0 cm    LV Systolic Volume 58 mL    LV Systolic Volume Index 22.7 mL/m2    LVIDs 3.7 2.1 - 4.0 cm    LV Diastolic Volume 135 mL    LV Diastolic Volume Index 52.73 mL/m2    Left Ventricular End Systolic Volume by Teichholz Method 57.57 mL    Left Ventricular End Diastolic Volume by Teichholz Method 134.91 mL    IVS 1.1 0.6 - 1.1 cm    LVOT diameter 2.1 cm    LVOT area 3.5 cm2    FS 30.2 28 - 44 %    Left Ventricle Relative Wall Thickness 0.34 cm    PW 0.9 0.6 - 1.1 cm    LV mass 200.4 g    LV Mass Index 78.3 g/m2    MV Peak E Owen 1.42 m/s    TDI LATERAL 0.11 m/s    TDI SEPTAL 0.13 m/s    E/E' ratio 12 m/s    TR Max Owen 3.2 m/s    E wave deceleration time 208 msec    LV SEPTAL E/E' RATIO 10.9 m/s    LV LATERAL E/E' RATIO 12.9 m/s    LVOT peak owen 0.8 m/s    Left Ventricular Outflow Tract Mean Velocity 0.57 cm/s    Left Ventricular Outflow Tract Mean Gradient 1.46 mmHg    RV- wilson basal diam 3.6 cm    RV/LV Ratio 0.68 cm    LA size 6.0 cm    Left Atrium Major Axis 7.3 cm    RA Major Axis 6.50 cm    AV mean gradient 4 mmHg    AV peak gradient 7 mmHg    Ao peak owen 1.3 m/s    Ao VTI 27.5 cm    LVOT peak VTI 17.6 cm    AV valve area 2.2 cm²    AV Velocity Ratio 0.62     AV index (prosthetic) 0.64     TONY by Velocity Ratio 2.1 cm²    Mr max owen 4.58 m/s    MV stenosis pressure 1/2 time 60.45 ms    MV valve area p 1/2 method 3.64 cm2    Triscuspid Valve Regurgitation Peak Gradient 41 mmHg    RVOT peak owen 0.58 m/s    Ao root annulus 3.39 cm    IVC diameter 3.29 cm    Mean " e' 0.12 m/s    ZLVIDS -7.60     ZLVIDD -11.30     RVDD 3.60 cm    AORTIC VALVE CUSP SEPERATION 1.15 cm    TAPSE 1.80 cm    TV resting pulmonary artery pressure 56 mmHg    RV TB RVSP 18 mmHg    Est. RA pres 15 mmHg    Narrative      Left Ventricle: The left ventricle is normal in size. Mildly increased   wall thickness. There is normal systolic function with a visually   estimated ejection fraction of 55 - 60%. Unable to assess diastolic   function due to atrial fibrillation.    Right Ventricle: The right ventricle has moderate enlargement. Systolic   function is normal.    Left Atrium: Severely dilated    Right Atrium: Right atrium is moderately dilated.    Aortic Valve: The aortic valve is a trileaflet valve. There is mild   aortic valve sclerosis.    Mitral Valve: There is mild regurgitation.    Tricuspid Valve: There is mild to moderate regurgitation.    IVC/SVC: Elevated venous pressure at 15 mmHg.    Left pleural effusion.          Assessment and Plan:    Acute exacerbation of HFpEF: Patient presented to Emergency Department with SOB, scrotal edema, and BLE edema. He was started on IV furosemide. This morning, he reports improving SOB. He also reports improving scrotal and BLE edema; however, not at baseline.             - Continue IV Lasix as dosed            - Continue spironolactone as dosed            - Monitor daily I's & O's            - Low Na+ diet            - Recommend nephrology consult given CKD            - Echocardiogram shows normal LV systolic function with an EF of 55- 60%. It also shows mild MR and moderate TR.      Atrial Fibrillation: Patient with history of atrial fibrillation. Telemetry this morning continues to show atrial fibrillation with a controlled rate. He is not currently on anticoagulation given cirrhosis.            - Continue metoprolol tartrate as dosed           - Continue telemetry           - Agree with no anticoagulation given cirrhosis     Cirrhosis: Management per primary  team          Active Diagnoses:    Diagnosis Date Noted POA    PRINCIPAL PROBLEM:  Decompensated cirrhosis [K72.90, K74.60] 10/04/2024 Yes    Scrotal edema [N50.89] 04/13/2025 Yes    Anasarca [R60.1] 04/13/2025 Yes    Thrombocytopenia [D69.6] 11/21/2024 Yes    Hepatic encephalopathy [K76.82] 11/21/2024 Yes    CKD (chronic kidney disease) stage 4, GFR 15-29 ml/min [N18.4] 11/20/2024 Yes    Longstanding persistent atrial fibrillation [I48.11] 11/29/2022 Yes    Cirrhosis [K74.60] 07/24/2014 Yes      Problems Resolved During this Admission:       VTE Risk Mitigation (From admission, onward)           Ordered     IP VTE HIGH RISK PATIENT  Once         04/12/25 2327     Place sequential compression device  Until discontinued         04/12/25 2327     Place ANDREW hose  Until discontinued         04/12/25 2327                  Provider's Attestation:  I, Luis Liu MD, confirm that OCTAVIA Starr worked under my direction at all times.  Documentation shall reflect findings and decisions made by myself in the course of the patient's treatment.  I have performed a face to face evaluation of the patient and reviewed the medical record. In addition, I have performed the substantive portion of the medical decision making. I have reviewed the notes and assessment, and I concur with her documentation.  CMS guidelines regarding the use of scribes shall be adhered to.  MD Luis Lewis MD  Cardiology  [facility]

## 2025-04-16 NOTE — PROGRESS NOTES
[facility] Medicine  Progress Note    Patient Name: Juan Carlos Yoo Sr.  MRN: [id number]  Patient Class: IP- Inpatient   Admission Date: 4/12/2025  Length of Stay: 2 days  Attending Physician: Joao Villegas III, MD  Primary Care Provider: Joao Villegas III, MD        Subjective     Principal Problem:Decompensated cirrhosis        HPI:  Patient 69-year-old male history of AFib, cirrhosis, hypertension came to the ED with complaints of increased swelling to testicular region, lower extremity, patient had been seen previously for same issue, patient is on Lasix and Aldactone, increased pain due to swelling, associated orthopnea and dyspnea on exertion reported no nausea vomiting reported, patient denied any fever chills    Overview/Hospital Course:  4/14 FM:  Patient known to me, patient presented to the emergency department with an acute 6 lb weight gain severe painful scrotal edema and dyspnea.  Patient is on an IV Lasix drip with some response.  Scrotum is still massive today.  Patient is not taking anticoagulation because of his thrombocytopenia and cirrhosis cardiology has been consulted patient also had a planned surgical removal of his tunneled dialysis catheter in the right chest for this week which may be on hold depending on how he responds to treatment this admission.  I have contacted surgery.  4/15 FM:  Patient is responding to the Lasix drip and had decent urine output, patient's scrotal edema has reduced about 30%.  Patient's electrolytes noted we will begin replacing potassium and magnesium as we diurese patient's midodrine was discontinued and I have explained to him that he will likely have chronic hypotension and once we get to a adequate volume level we will see how he tolerates his blood pressures.  We will likely require a prolonged hospitalization.  4/16 FM:  Patient is diuresing slowly, patient's weight has not changed much on measurement but question accuracy.  Patient's  "scrotal edema is improved some but still persist.  Patient's creatinine is down on IV Lasix drip, we will consult patient's nephrologist to follow along.  Leaving Vas-Cath in place for now.  Patient's getting out of bed and working with therapy which I have continued to encourage.  Midodrine is on hold which I do suspect was contributing to volume retention.    Past Medical History:   Diagnosis Date    Atrial fibrillation     Bulging disc     Cirrhosis     Colon polyp 12/29/2017    Rpt 5 yrs    Encounter for blood transfusion     EV (esophageal varices)     Hypertension 03/30/2023    Renal disorder     Skin cancer 03/2017    'NOSE"    Thrombocytopenia        Past Surgical History:   Procedure Laterality Date    CATHETERIZATION OF BOTH LEFT AND RIGHT HEART N/A 02/08/2023    Procedure: CATHETERIZATION, HEART, BOTH LEFT AND RIGHT;  Surgeon: Flo Malone MD;  Location: [facility] CATH LAB;  Service: Cardiology;  Laterality: N/A;  low bleeding risk 1.0%    CHOLECYSTECTOMY      COLONOSCOPY      COLONOSCOPY N/A 12/29/2017    ta rpt 2022    CORONARY ANGIOGRAPHY N/A 02/08/2023    Procedure: ANGIOGRAM, CORONARY ARTERY;  Surgeon: Flo Malone MD;  Location: [facility] CATH LAB;  Service: Cardiology;  Laterality: N/A;    HERNIA REPAIR      INCISION AND DRAINAGE Right 02/27/2025    Procedure: Incision and Drainage, thigh;  Surgeon: Kayla Foster MD;  Location: [facility];  Service: General;  Laterality: Right;  Plan to go first if pt has cardiac clearance - SB    SKIN BIOPSY  03/2017    TONSILLECTOMY      UPPER GASTROINTESTINAL ENDOSCOPY  2011    EV    UPPER GASTROINTESTINAL ENDOSCOPY  2016    VASECTOMY         Review of patient's allergies indicates:  No Known Allergies    No current facility-administered medications on file prior to encounter.     Current Outpatient Medications on File Prior to Encounter   Medication Sig    famotidine (PEPCID) 20 MG tablet Take 1 tablet (20 mg total) by mouth once daily.    furosemide (LASIX) " 80 MG tablet Take 1 tablet (80 mg total) by mouth 2 (two) times a day for 3 days, THEN 1 tablet (80 mg total) once daily.    lactulose (CHRONULAC) 20 gram/30 mL Soln Take 45 mLs (30 g total) by mouth 3 (three) times daily.    magnesium chloride (MAG 64) 64 mg TbEC Take 2 tablets (128 mg total) by mouth once daily.    metoprolol tartrate (LOPRESSOR) 25 MG tablet TAKE 0.5 TABLETS BY MOUTH 2 TIMES DAILY.    midodrine (PROAMATINE) 5 MG Tab Take 3 tablets (15 mg total) by mouth 3 (three) times daily with meals.    potassium bicarbonate (K-LYTE) disintegrating tablet Take 1 tablet (25 mEq total) by mouth 2 (two) times a day.    spironolactone (ALDACTONE) 25 MG tablet Take 1 tablet (25 mg total) by mouth 2 (two) times daily.    tamsulosin (FLOMAX) 0.4 mg Cap Take 1 capsule (0.4 mg total) by mouth once daily.    thiamine 100 MG tablet Take 100 mg by mouth once daily.    apixaban (ELIQUIS) 5 mg Tab Take 5 mg by mouth 2 (two) times daily.     Family History       Problem Relation (Age of Onset)    Alzheimer's disease Mother    Cancer Maternal Uncle    Heart disease Maternal Uncle    Hyperlipidemia Brother    No Known Problems Father    Ovarian cancer Sister          Tobacco Use    Smoking status: Never     Passive exposure: Never    Smokeless tobacco: Never   Substance and Sexual Activity    Alcohol use: Not Currently     Alcohol/week: 0.0 standard drinks of alcohol    Drug use: No    Sexual activity: Not Currently     Review of Systems   Constitutional:  Positive for fatigue. Negative for chills and fever.   HENT:  Negative for congestion.    Eyes:  Negative for visual disturbance.   Respiratory:  Positive for shortness of breath. Negative for wheezing.    Cardiovascular:  Positive for leg swelling. Negative for chest pain and palpitations.   Gastrointestinal:  Positive for abdominal distention. Negative for abdominal pain, constipation, diarrhea, nausea and vomiting.   Endocrine: Negative for polyuria.   Genitourinary:   Positive for scrotal swelling (Scrotal edema). Negative for dysuria.   Musculoskeletal:  Positive for arthralgias. Negative for back pain and gait problem.   Skin:  Negative for wound.   Neurological:  Positive for weakness.   Psychiatric/Behavioral:  The patient is not nervous/anxious.      Objective:     Vital Signs (Most Recent):  Temp: 98 °F (36.7 °C) (04/16/25 0723)  Pulse: 91 (04/16/25 0723)  Resp: 18 (04/16/25 0723)  BP: (!) 99/50 (04/16/25 0723)  SpO2: 96 % (04/16/25 0723) Vital Signs (24h Range):  Temp:  [97.7 °F (36.5 °C)-98.3 °F (36.8 °C)] 98 °F (36.7 °C)  Pulse:  [] 91  Resp:  [18-20] 18  SpO2:  [93 %-99 %] 96 %  BP: ()/(50-58) 99/50     Weight: (!) 140.7 kg (310 lb 3.2 oz)  Body mass index is 40.93 kg/m².     Physical Exam  Vitals and nursing note reviewed.   Constitutional:       General: He is not in acute distress.     Appearance: He is well-developed. He is obese. He is ill-appearing. He is not diaphoretic.   HENT:      Head: Normocephalic and atraumatic.      Nose: No congestion or rhinorrhea.   Eyes:      General: Scleral icterus present.         Right eye: No discharge.         Left eye: No discharge.      Extraocular Movements: Extraocular movements intact.   Neck:      Thyroid: No thyromegaly.      Vascular: No JVD.   Cardiovascular:      Rate and Rhythm: Normal rate. Rhythm irregular.      Heart sounds: Normal heart sounds. No murmur heard.     No friction rub. No gallop.   Pulmonary:      Effort: No respiratory distress.      Breath sounds: No wheezing, rhonchi or rales.   Abdominal:      General: There is distension.      Tenderness: There is no abdominal tenderness. There is no guarding or rebound.      Hernia: No hernia is present.   Genitourinary:     Comments: Scrotal edema (massive)  Musculoskeletal:      Cervical back: Neck supple.      Right lower leg: Edema present.      Left lower leg: Edema present.   Neurological:      Mental Status: He is alert and oriented to person,  place, and time. Mental status is at baseline.      Motor: Weakness present.   Psychiatric:         Behavior: Behavior normal.         Thought Content: Thought content normal.                Significant Labs: All pertinent labs within the past 24 hours have been reviewed.  Recent Lab Results         04/16/25  0532        Albumin 1.9       ALP 74       ALT 9       Anion Gap 8       AST 28       Baso # 0.07       Basophil % 1.6       BILIRUBIN TOTAL 3.3  Comment: For infants and newborns, interpretation of results should be based   on gestational age, weight and in agreement with clinical   observations.    Premature Infant recommended reference ranges:   0-24 hours:  <8.0 mg/dL   24-48 hours: <12.0 mg/dL   3-5 days:    <15.0 mg/dL   6-29 days:   <15.0 mg/dL       BUN 16       Calcium 8.4       Chloride 98       CO2 29       Creatinine 2.0       eGFR 35  Comment: Estimated GFR calculated using the CKD-EPI creatinine (2021) equation.       Eos # 0.13       Eos % 2.9       Glucose 127       Gran # (ANC) 2.72       Hematocrit 27.6       Hemoglobin 9.0       Immature Grans (Abs) 0.02  Comment: Mild elevation in immature granulocytes is non specific and can be seen in a variety of conditions including stress response, acute inflammation, trauma and pregnancy. Correlation with other laboratory and clinical findings is essential.       Immature Granulocytes 0.4       Lymph # 0.99       Lymph % 22.0       Magnesium  1.4       MCH 31.4       MCHC 32.6       MCV 96       Mono # 0.57       Mono % 12.7       MPV 11.0       Neut % 60.4       nRBC 0       Platelet Count 73       Potassium 3.4       PROTEIN TOTAL 6.1       RBC 2.87       RDW 15.8       Sodium 135       WBC 4.50               Significant Imaging: I have reviewed all pertinent imaging results/findings within the past 24 hours.      Assessment & Plan  Decompensated cirrhosis  Patient with known Cirrhosis with Child's class Calculator for Child's score link-  [email or web address]-ocampo-score-cirrhosis-mortality#creator-insights. Co-morbidities are present and inclusive of esophageal varices, hepatic encephalopathy, and anemia/pancytopenia.  MELD-Na score calculated; MELD 3.0: 27 at 4/15/2025  5:36 AM  MELD-Na: 26 at 4/15/2025  5:36 AM  Calculated from:  Serum Creatinine: 2.2 mg/dL at 4/15/2025  5:36 AM  Serum Sodium: 135 mmol/L at 4/15/2025  5:36 AM  Total Bilirubin: 3.5 mg/dL at 4/15/2025  5:36 AM  Serum Albumin: 2 g/dL at 4/15/2025  5:36 AM  INR(ratio): 1.7 at 4/13/2025 12:42 PM  Age at listing (hypothetical): 69 years  Sex: Male at 4/15/2025  5:36 AM      Continue chronic meds. Etiology likely ETOH. Will avoid any hepatotoxic meds, and monitor CBC/CMP/INR for synthetic function.     4/16 FM:  Slowly improving.  Cirrhosis  Patient with known Cirrhosis with Child's class Calculator for Child's score link- [email or web address]/child-ocampo-score-cirrhosis-mortality#creator-insights. Co-morbidities are present and inclusive of ascites and anemia/pancytopenia.  MELD-Na score calculated; MELD 3.0: 27 at 4/15/2025  5:36 AM  MELD-Na: 26 at 4/15/2025  5:36 AM  Calculated from:  Serum Creatinine: 2.2 mg/dL at 4/15/2025  5:36 AM  Serum Sodium: 135 mmol/L at 4/15/2025  5:36 AM  Total Bilirubin: 3.5 mg/dL at 4/15/2025  5:36 AM  Serum Albumin: 2 g/dL at 4/15/2025  5:36 AM  INR(ratio): 1.7 at 4/13/2025 12:42 PM  Age at listing (hypothetical): 69 years  Sex: Male at 4/15/2025  5:36 AM      Continue chronic meds. Etiology likely ETOH. Will avoid any hepatotoxic meds, and monitor CBC/CMP/INR for synthetic function.   Patient reported     4/15 FM:  DC above, stopping midrodine.  Longstanding persistent atrial fibrillation  Patient has persistent (7 days or more) atrial fibrillation. Patient is currently in atrial fibrillation. [id number] Score: 1. The patients heart rate in the last 24 hours is as follows:  Pulse  Min: 81  Max: 114      Antiarrhythmics  metoprolol tartrate (LOPRESSOR) tablet 50 mg, 2 times daily, Oral    Anticoagulants       Plan  - Replete lytes with a goal of K>4, Mg >2  - Patient is not anticoagulated due to thrombocytopenia   - Patient's afib is currently uncontrolled. Will adjust treatment as follows: adjust lopressor dose  - cardiology consulted    4/14 FM:  No anticoags 2nd CLD, Plt Count.  4/15 FM:  Cont. Txt plan.  Thrombocytopenia  The likely etiology of thrombocytopenia is liver disease. The patients 3 most recent labs are listed below.  Recent Labs     04/15/25  0536 04/16/25  0532   PLT 67* 73*     Plan  - Will transfuse if platelet count is <20k.    Scrotal edema  4/14 FM:  Not much change overnight, cont IV lasix drip.  4/15 FM:  Improving slowly, echo noted.  4/16 FM:  Slowly improving.    Anasarca  2/2 to cirrhosis, diuresis on board, replete electrolytes, monitor urine output    4/14 FM:  Stopping midrodine today, possible related to volume retention.  4/15 FM:  Slowly improving, need to establish lowest tolerable dry weight.  4/16 FM:  Slowly improving.  CKD (chronic kidney disease) stage 4, GFR 15-29 ml/min  Creatine stable for now. BMP reviewed- noted Estimated Creatinine Clearance: 49.9 mL/min (A) (based on SCr of 2 mg/dL (H)). according to latest data. Based on current GFR, CKD stage is stage 4 - GFR 15-29.  Monitor UOP and serial BMP and adjust therapy as needed. Renally dose meds. Avoid nephrotoxic medications and procedures.    4/14 FM:  Will not DC tunneled cath just yet.  4/15 FM:  Patient is responding to lasix drip.  4/16 FM:  Slowly improving.  Hepatic encephalopathy  4/14 FM:  Stable.  4/15 FM:  Cont current lactulose, adding xifaxin.    VTE Risk Mitigation (From admission, onward)           Ordered     IP VTE HIGH RISK PATIENT  Once         04/12/25 2327     Place sequential compression device  Until discontinued         04/12/25 2327     Place ANDREW hose  Until discontinued         04/12/25 2327                     Discharge Planning   AUTUNM:      Code Status: Full Code   Medical Readiness for Discharge Date:   Discharge Plan A: Home with family, Home Health                        Joao Villegas III, MD  Department of Hospital Medicine   [facility] [address] [facility]

## 2025-04-16 NOTE — ASSESSMENT & PLAN NOTE
2/2 to cirrhosis, diuresis on board, replete electrolytes, monitor urine output    4/14 FM:  Stopping midrodine today, possible related to volume retention.  4/15 FM:  Slowly improving, need to establish lowest tolerable dry weight.  4/16 FM:  Slowly improving.

## 2025-04-16 NOTE — ASSESSMENT & PLAN NOTE
The likely etiology of thrombocytopenia is liver disease. The patients 3 most recent labs are listed below.  Recent Labs     04/15/25  0536 04/16/25  0532   PLT 67* 73*     Plan  - Will transfuse if platelet count is <20k.

## 2025-04-16 NOTE — ASSESSMENT & PLAN NOTE
Patient with known Cirrhosis with Child's class Calculator for Child's score link- [email or web address]/[email or web address]/child-ocampo-score-cirrhosis-mortality#creator-insights. Co-morbidities are present and inclusive of ascites and anemia/pancytopenia.  MELD-Na score calculated; MELD 3.0: 27 at 4/15/2025  5:36 AM  MELD-Na: 26 at 4/15/2025  5:36 AM  Calculated from:  Serum Creatinine: 2.2 mg/dL at 4/15/2025  5:36 AM  Serum Sodium: 135 mmol/L at 4/15/2025  5:36 AM  Total Bilirubin: 3.5 mg/dL at 4/15/2025  5:36 AM  Serum Albumin: 2 g/dL at 4/15/2025  5:36 AM  INR(ratio): 1.7 at 4/13/2025 12:42 PM  Age at listing (hypothetical): 69 years  Sex: Male at 4/15/2025  5:36 AM      Continue chronic meds. Etiology likely ETOH. Will avoid any hepatotoxic meds, and monitor CBC/CMP/INR for synthetic function.   Patient reported     4/15 FM:  DC above, stopping midrodine.

## 2025-04-16 NOTE — PLAN OF CARE
Problem: Adult Inpatient Plan of Care  Goal: Plan of Care Review  Outcome: Progressing  Goal: Patient-Specific Goal (Individualized)  Outcome: Progressing  Goal: Absence of Hospital-Acquired Illness or Injury  Outcome: Progressing  Goal: Optimal Comfort and Wellbeing  Outcome: Progressing  Goal: Readiness for Transition of Care  Outcome: Progressing     Problem: Acute Kidney Injury/Impairment  Goal: Fluid and Electrolyte Balance  Outcome: Progressing  Goal: Improved Oral Intake  Outcome: Progressing  Goal: Effective Renal Function  Outcome: Progressing     Problem: Infection  Goal: Absence of Infection Signs and Symptoms  Outcome: Progressing     Problem: Wound  Goal: Optimal Coping  Outcome: Progressing  Goal: Optimal Functional Ability  Outcome: Progressing  Goal: Absence of Infection Signs and Symptoms  Outcome: Progressing  Goal: Improved Oral Intake  Outcome: Progressing  Goal: Optimal Pain Control and Function  Outcome: Progressing  Goal: Skin Health and Integrity  Outcome: Progressing  Goal: Optimal Wound Healing  Outcome: Progressing     Problem: Skin Injury Risk Increased  Goal: Skin Health and Integrity  Outcome: Progressing     Problem: Fall Injury Risk  Goal: Absence of Fall and Fall-Related Injury  Outcome: Progressing     Problem: Bariatric Environmental Safety  Goal: Safety Maintained with Care  Outcome: Progressing

## 2025-04-16 NOTE — SUBJECTIVE & OBJECTIVE
"Past Medical History:   Diagnosis Date    Atrial fibrillation     Bulging disc     Cirrhosis     Colon polyp 12/29/2017    Rpt 5 yrs    Encounter for blood transfusion     EV (esophageal varices)     Hypertension 03/30/2023    Renal disorder     Skin cancer 03/2017    'NOSE"    Thrombocytopenia        Past Surgical History:   Procedure Laterality Date    CATHETERIZATION OF BOTH LEFT AND RIGHT HEART N/A 02/08/2023    Procedure: CATHETERIZATION, HEART, BOTH LEFT AND RIGHT;  Surgeon: Flo Malone MD;  Location: [facility] CATH LAB;  Service: Cardiology;  Laterality: N/A;  low bleeding risk 1.0%    CHOLECYSTECTOMY      COLONOSCOPY      COLONOSCOPY N/A 12/29/2017    ta rpt 2022    CORONARY ANGIOGRAPHY N/A 02/08/2023    Procedure: ANGIOGRAM, CORONARY ARTERY;  Surgeon: Flo Malone MD;  Location: [facility] CATH LAB;  Service: Cardiology;  Laterality: N/A;    HERNIA REPAIR      INCISION AND DRAINAGE Right 02/27/2025    Procedure: Incision and Drainage, thigh;  Surgeon: Kayla Foster MD;  Location: [facility];  Service: General;  Laterality: Right;  Plan to go first if pt has cardiac clearance - SB    SKIN BIOPSY  03/2017    TONSILLECTOMY      UPPER GASTROINTESTINAL ENDOSCOPY  2011    EV    UPPER GASTROINTESTINAL ENDOSCOPY  2016    VASECTOMY         Review of patient's allergies indicates:  No Known Allergies    No current facility-administered medications on file prior to encounter.     Current Outpatient Medications on File Prior to Encounter   Medication Sig    famotidine (PEPCID) 20 MG tablet Take 1 tablet (20 mg total) by mouth once daily.    furosemide (LASIX) 80 MG tablet Take 1 tablet (80 mg total) by mouth 2 (two) times a day for 3 days, THEN 1 tablet (80 mg total) once daily.    lactulose (CHRONULAC) 20 gram/30 mL Soln Take 45 mLs (30 g total) by mouth 3 (three) times daily.    magnesium chloride (MAG 64) 64 mg TbEC Take 2 tablets (128 mg total) by mouth once daily.    metoprolol tartrate (LOPRESSOR) 25 MG tablet " TAKE 0.5 TABLETS BY MOUTH 2 TIMES DAILY.    midodrine (PROAMATINE) 5 MG Tab Take 3 tablets (15 mg total) by mouth 3 (three) times daily with meals.    potassium bicarbonate (K-LYTE) disintegrating tablet Take 1 tablet (25 mEq total) by mouth 2 (two) times a day.    spironolactone (ALDACTONE) 25 MG tablet Take 1 tablet (25 mg total) by mouth 2 (two) times daily.    tamsulosin (FLOMAX) 0.4 mg Cap Take 1 capsule (0.4 mg total) by mouth once daily.    thiamine 100 MG tablet Take 100 mg by mouth once daily.    apixaban (ELIQUIS) 5 mg Tab Take 5 mg by mouth 2 (two) times daily.     Family History       Problem Relation (Age of Onset)    Alzheimer's disease Mother    Cancer Maternal Uncle    Heart disease Maternal Uncle    Hyperlipidemia Brother    No Known Problems Father    Ovarian cancer Sister          Tobacco Use    Smoking status: Never     Passive exposure: Never    Smokeless tobacco: Never   Substance and Sexual Activity    Alcohol use: Not Currently     Alcohol/week: 0.0 standard drinks of alcohol    Drug use: No    Sexual activity: Not Currently     Review of Systems   Constitutional:  Positive for fatigue. Negative for chills and fever.   HENT:  Negative for congestion.    Eyes:  Negative for visual disturbance.   Respiratory:  Positive for shortness of breath. Negative for wheezing.    Cardiovascular:  Positive for leg swelling. Negative for chest pain and palpitations.   Gastrointestinal:  Positive for abdominal distention. Negative for abdominal pain, constipation, diarrhea, nausea and vomiting.   Endocrine: Negative for polyuria.   Genitourinary:  Positive for scrotal swelling (Scrotal edema). Negative for dysuria.   Musculoskeletal:  Positive for arthralgias. Negative for back pain and gait problem.   Skin:  Negative for wound.   Neurological:  Positive for weakness.   Psychiatric/Behavioral:  The patient is not nervous/anxious.      Objective:     Vital Signs (Most Recent):  Temp: 98 °F (36.7 °C) (04/16/25  0723)  Pulse: 91 (04/16/25 0723)  Resp: 18 (04/16/25 0723)  BP: (!) 99/50 (04/16/25 0723)  SpO2: 96 % (04/16/25 0723) Vital Signs (24h Range):  Temp:  [97.7 °F (36.5 °C)-98.3 °F (36.8 °C)] 98 °F (36.7 °C)  Pulse:  [] 91  Resp:  [18-20] 18  SpO2:  [93 %-99 %] 96 %  BP: ()/(50-58) 99/50     Weight: (!) 140.7 kg (310 lb 3.2 oz)  Body mass index is 40.93 kg/m².     Physical Exam  Vitals and nursing note reviewed.   Constitutional:       General: He is not in acute distress.     Appearance: He is well-developed. He is obese. He is ill-appearing. He is not diaphoretic.   HENT:      Head: Normocephalic and atraumatic.      Nose: No congestion or rhinorrhea.   Eyes:      General: Scleral icterus present.         Right eye: No discharge.         Left eye: No discharge.      Extraocular Movements: Extraocular movements intact.   Neck:      Thyroid: No thyromegaly.      Vascular: No JVD.   Cardiovascular:      Rate and Rhythm: Normal rate. Rhythm irregular.      Heart sounds: Normal heart sounds. No murmur heard.     No friction rub. No gallop.   Pulmonary:      Effort: No respiratory distress.      Breath sounds: No wheezing, rhonchi or rales.   Abdominal:      General: There is distension.      Tenderness: There is no abdominal tenderness. There is no guarding or rebound.      Hernia: No hernia is present.   Genitourinary:     Comments: Scrotal edema (massive)  Musculoskeletal:      Cervical back: Neck supple.      Right lower leg: Edema present.      Left lower leg: Edema present.   Neurological:      Mental Status: He is alert and oriented to person, place, and time. Mental status is at baseline.      Motor: Weakness present.   Psychiatric:         Behavior: Behavior normal.         Thought Content: Thought content normal.                Significant Labs: All pertinent labs within the past 24 hours have been reviewed.  Recent Lab Results         04/16/25  0532        Albumin 1.9       ALP 74       ALT 9        Anion Gap 8       AST 28       Baso # 0.07       Basophil % 1.6       BILIRUBIN TOTAL 3.3  Comment: For infants and newborns, interpretation of results should be based   on gestational age, weight and in agreement with clinical   observations.    Premature Infant recommended reference ranges:   0-24 hours:  <8.0 mg/dL   24-48 hours: <12.0 mg/dL   3-5 days:    <15.0 mg/dL   6-29 days:   <15.0 mg/dL       BUN 16       Calcium 8.4       Chloride 98       CO2 29       Creatinine 2.0       eGFR 35  Comment: Estimated GFR calculated using the CKD-EPI creatinine (2021) equation.       Eos # 0.13       Eos % 2.9       Glucose 127       Gran # (ANC) 2.72       Hematocrit 27.6       Hemoglobin 9.0       Immature Grans (Abs) 0.02  Comment: Mild elevation in immature granulocytes is non specific and can be seen in a variety of conditions including stress response, acute inflammation, trauma and pregnancy. Correlation with other laboratory and clinical findings is essential.       Immature Granulocytes 0.4       Lymph # 0.99       Lymph % 22.0       Magnesium  1.4       MCH 31.4       MCHC 32.6       MCV 96       Mono # 0.57       Mono % 12.7       MPV 11.0       Neut % 60.4       nRBC 0       Platelet Count 73       Potassium 3.4       PROTEIN TOTAL 6.1       RBC 2.87       RDW 15.8       Sodium 135       WBC 4.50               Significant Imaging: I have reviewed all pertinent imaging results/findings within the past 24 hours.

## 2025-04-16 NOTE — ASSESSMENT & PLAN NOTE
Patient has persistent (7 days or more) atrial fibrillation. Patient is currently in atrial fibrillation. [id number] Score: 1. The patients heart rate in the last 24 hours is as follows:  Pulse  Min: 81  Max: 114     Antiarrhythmics  metoprolol tartrate (LOPRESSOR) tablet 50 mg, 2 times daily, Oral    Anticoagulants       Plan  - Replete lytes with a goal of K>4, Mg >2  - Patient is not anticoagulated due to thrombocytopenia   - Patient's afib is currently uncontrolled. Will adjust treatment as follows: adjust lopressor dose  - cardiology consulted    4/14 FM:  No anticoags 2nd CLD, Plt Count.  4/15 FM:  Cont. Txt plan.

## 2025-04-16 NOTE — ASSESSMENT & PLAN NOTE
Patient with known Cirrhosis with Child's class Calculator for Child's score link- [email or web address]://www.[email or web address]/[email or web address]/child-ocampo-score-cirrhosis-mortality#creator-insights. Co-morbidities are present and inclusive of esophageal varices, hepatic encephalopathy, and anemia/pancytopenia.  MELD-Na score calculated; MELD 3.0: 27 at 4/15/2025  5:36 AM  MELD-Na: 26 at 4/15/2025  5:36 AM  Calculated from:  Serum Creatinine: 2.2 mg/dL at 4/15/2025  5:36 AM  Serum Sodium: 135 mmol/L at 4/15/2025  5:36 AM  Total Bilirubin: 3.5 mg/dL at 4/15/2025  5:36 AM  Serum Albumin: 2 g/dL at 4/15/2025  5:36 AM  INR(ratio): 1.7 at 4/13/2025 12:42 PM  Age at listing (hypothetical): 69 years  Sex: Male at 4/15/2025  5:36 AM      Continue chronic meds. Etiology likely ETOH. Will avoid any hepatotoxic meds, and monitor CBC/CMP/INR for synthetic function.     4/16 FM:  Slowly improving.

## 2025-04-16 NOTE — ASSESSMENT & PLAN NOTE
4/14 FM:  Not much change overnight, cont IV lasix drip.  4/15 FM:  Improving slowly, echo noted.  4/16 FM:  Slowly improving.

## 2025-04-17 LAB
ABSOLUTE EOSINOPHIL (OHS): 0.16 K/UL
ABSOLUTE MONOCYTE (OHS): 0.7 K/UL (ref 0.3–1)
ABSOLUTE NEUTROPHIL COUNT (OHS): 3.08 K/UL (ref 1.8–7.7)
ALBUMIN SERPL BCP-MCNC: 1.9 G/DL (ref 3.5–5.2)
ALP SERPL-CCNC: 67 UNIT/L (ref 40–150)
ALT SERPL W/O P-5'-P-CCNC: 10 UNIT/L (ref 10–44)
ANION GAP (OHS): 8 MMOL/L (ref 8–16)
AST SERPL-CCNC: 29 UNIT/L (ref 11–45)
BASOPHILS # BLD AUTO: 0.05 K/UL
BASOPHILS NFR BLD AUTO: 1.1 %
BILIRUB SERPL-MCNC: 3.5 MG/DL (ref 0.1–1)
BUN SERPL-MCNC: 14 MG/DL (ref 8–23)
CALCIUM SERPL-MCNC: 8.1 MG/DL (ref 8.7–10.5)
CHLORIDE SERPL-SCNC: 99 MMOL/L (ref 95–110)
CO2 SERPL-SCNC: 29 MMOL/L (ref 23–29)
CREAT SERPL-MCNC: 1.8 MG/DL (ref 0.5–1.4)
ERYTHROCYTE [DISTWIDTH] IN BLOOD BY AUTOMATED COUNT: 16.3 % (ref 11.5–14.5)
GFR SERPLBLD CREATININE-BSD FMLA CKD-EPI: 40 ML/MIN/1.73/M2
GLUCOSE SERPL-MCNC: 99 MG/DL (ref 70–110)
HCT VFR BLD AUTO: 29.7 % (ref 40–54)
HGB BLD-MCNC: 9.7 GM/DL (ref 14–18)
IMM GRANULOCYTES # BLD AUTO: 0.02 K/UL (ref 0–0.04)
IMM GRANULOCYTES NFR BLD AUTO: 0.4 % (ref 0–0.5)
LYMPHOCYTES # BLD AUTO: 0.63 K/UL (ref 1–4.8)
MAGNESIUM SERPL-MCNC: 1.6 MG/DL (ref 1.6–2.6)
MCH RBC QN AUTO: 32.1 PG (ref 27–31)
MCHC RBC AUTO-ENTMCNC: 32.7 G/DL (ref 32–36)
MCV RBC AUTO: 98 FL (ref 82–98)
NUCLEATED RBC (/100WBC) (OHS): 0 /100 WBC
PLATELET # BLD AUTO: 45 K/UL (ref 150–450)
PLATELET BLD QL SMEAR: ABNORMAL
PMV BLD AUTO: 12.7 FL (ref 9.2–12.9)
POTASSIUM SERPL-SCNC: 3.2 MMOL/L (ref 3.5–5.1)
PROT SERPL-MCNC: 6.3 GM/DL (ref 6–8.4)
RBC # BLD AUTO: 3.02 M/UL (ref 4.6–6.2)
RELATIVE EOSINOPHIL (OHS): 3.4 %
RELATIVE LYMPHOCYTE (OHS): 13.6 % (ref 18–48)
RELATIVE MONOCYTE (OHS): 15.1 % (ref 4–15)
RELATIVE NEUTROPHIL (OHS): 66.4 % (ref 38–73)
SODIUM SERPL-SCNC: 136 MMOL/L (ref 136–145)
WBC # BLD AUTO: 4.64 K/UL (ref 3.9–12.7)

## 2025-04-17 PROCEDURE — 25000003 PHARM REV CODE 250: Performed by: INTERNAL MEDICINE

## 2025-04-17 PROCEDURE — 84520 ASSAY OF UREA NITROGEN: CPT | Performed by: INTERNAL MEDICINE

## 2025-04-17 PROCEDURE — 85025 COMPLETE CBC W/AUTO DIFF WBC: CPT | Performed by: INTERNAL MEDICINE

## 2025-04-17 PROCEDURE — 83735 ASSAY OF MAGNESIUM: CPT | Performed by: INTERNAL MEDICINE

## 2025-04-17 PROCEDURE — 25000003 PHARM REV CODE 250: Performed by: EMERGENCY MEDICINE

## 2025-04-17 PROCEDURE — 63600175 PHARM REV CODE 636 W HCPCS: Performed by: EMERGENCY MEDICINE

## 2025-04-17 PROCEDURE — 11000001 HC ACUTE MED/SURG PRIVATE ROOM

## 2025-04-17 PROCEDURE — 36415 COLL VENOUS BLD VENIPUNCTURE: CPT | Performed by: INTERNAL MEDICINE

## 2025-04-17 RX ADMIN — LACTULOSE 20 G: 20 SOLUTION ORAL at 08:04

## 2025-04-17 RX ADMIN — POTASSIUM BICARBONATE 20 MEQ: 391 TABLET, EFFERVESCENT ORAL at 10:04

## 2025-04-17 RX ADMIN — TAMSULOSIN HYDROCHLORIDE 0.4 MG: 0.4 CAPSULE ORAL at 09:04

## 2025-04-17 RX ADMIN — FAMOTIDINE 20 MG: 20 TABLET, FILM COATED ORAL at 09:04

## 2025-04-17 RX ADMIN — Medication 6 MG: at 10:04

## 2025-04-17 RX ADMIN — LACTULOSE 20 G: 20 SOLUTION ORAL at 10:04

## 2025-04-17 RX ADMIN — MAGNESIUM 64 MG (MAGNESIUM CHLORIDE) TABLET,DELAYED RELEASE 64 MG: at 09:04

## 2025-04-17 RX ADMIN — MAGNESIUM 64 MG (MAGNESIUM CHLORIDE) TABLET,DELAYED RELEASE 64 MG: at 10:04

## 2025-04-17 RX ADMIN — RIFAXIMIN 550 MG: 550 TABLET ORAL at 10:04

## 2025-04-17 RX ADMIN — SPIRONOLACTONE 25 MG: 25 TABLET ORAL at 10:04

## 2025-04-17 RX ADMIN — RIFAXIMIN 550 MG: 550 TABLET ORAL at 09:04

## 2025-04-17 RX ADMIN — FUROSEMIDE 10 MG/HR: 10 INJECTION, SOLUTION INTRAMUSCULAR; INTRAVENOUS at 02:04

## 2025-04-17 RX ADMIN — SPIRONOLACTONE 25 MG: 25 TABLET ORAL at 09:04

## 2025-04-17 RX ADMIN — MUPIROCIN: 20 OINTMENT TOPICAL at 08:04

## 2025-04-17 RX ADMIN — Medication 100 MG: at 09:04

## 2025-04-17 RX ADMIN — POTASSIUM BICARBONATE 20 MEQ: 391 TABLET, EFFERVESCENT ORAL at 08:04

## 2025-04-17 RX ADMIN — METOPROLOL TARTRATE 50 MG: 50 TABLET, FILM COATED ORAL at 09:04

## 2025-04-17 NOTE — ASSESSMENT & PLAN NOTE
Patient has persistent (7 days or more) atrial fibrillation. Patient is currently in atrial fibrillation. [id number] Score: 1. The patients heart rate in the last 24 hours is as follows:  Pulse  Min: 81  Max: 100     Antiarrhythmics  metoprolol tartrate (LOPRESSOR) tablet 50 mg, 2 times daily, Oral    Anticoagulants       Plan  - Replete lytes with a goal of K>4, Mg >2  - Patient is not anticoagulated due to thrombocytopenia   - Patient's afib is currently uncontrolled. Will adjust treatment as follows: adjust lopressor dose  - cardiology consulted    4/14 FM:  No anticoags 2nd CLD, Plt Count.  4/15 FM:  Cont. Txt plan.

## 2025-04-17 NOTE — ASSESSMENT & PLAN NOTE
Patient with known Cirrhosis with Child's class Calculator for Child's score link- [email or web address]-ocampo-score-cirrhosis-mortality#creator-insights. Co-morbidities are present and inclusive of ascites and anemia/pancytopenia.  MELD-Na score calculated; MELD 3.0: 27 at 4/15/2025  5:36 AM  MELD-Na: 26 at 4/15/2025  5:36 AM  Calculated from:  Serum Creatinine: 2.2 mg/dL at 4/15/2025  5:36 AM  Serum Sodium: 135 mmol/L at 4/15/2025  5:36 AM  Total Bilirubin: 3.5 mg/dL at 4/15/2025  5:36 AM  Serum Albumin: 2 g/dL at 4/15/2025  5:36 AM  INR(ratio): 1.7 at 4/13/2025 12:42 PM  Age at listing (hypothetical): 69 years  Sex: Male at 4/15/2025  5:36 AM      Continue chronic meds. Etiology likely ETOH. Will avoid any hepatotoxic meds, and monitor CBC/CMP/INR for synthetic function.   Patient reported     4/15 FM:  DC above, stopping midrodine.

## 2025-04-17 NOTE — ASSESSMENT & PLAN NOTE
Patient with known Cirrhosis with Child's class Calculator for Child's score link- [email or web address].[email or web address]/child-ocampo-score-cirrhosis-mortality#creator-insights. Co-morbidities are present and inclusive of esophageal varices, hepatic encephalopathy, and anemia/pancytopenia.  MELD-Na score calculated; MELD 3.0: 27 at 4/15/2025  5:36 AM  MELD-Na: 26 at 4/15/2025  5:36 AM  Calculated from:  Serum Creatinine: 2.2 mg/dL at 4/15/2025  5:36 AM  Serum Sodium: 135 mmol/L at 4/15/2025  5:36 AM  Total Bilirubin: 3.5 mg/dL at 4/15/2025  5:36 AM  Serum Albumin: 2 g/dL at 4/15/2025  5:36 AM  INR(ratio): 1.7 at 4/13/2025 12:42 PM  Age at listing (hypothetical): 69 years  Sex: Male at 4/15/2025  5:36 AM      Continue chronic meds. Etiology likely ETOH. Will avoid any hepatotoxic meds, and monitor CBC/CMP/INR for synthetic function.     4/16 FM:  Slowly improving.

## 2025-04-17 NOTE — ASSESSMENT & PLAN NOTE
The likely etiology of thrombocytopenia is liver disease. The patients 3 most recent labs are listed below.  Recent Labs     04/15/25  0536 04/16/25  0532 04/17/25  0646   PLT 67* 73* 45*     Plan  - Will transfuse if platelet count is <20k.

## 2025-04-17 NOTE — PROGRESS NOTES
[facility] Medicine  Progress Note    Patient Name: Juan Carlos Yoo Sr.  MRN: [id number]  Patient Class: IP- Inpatient   Admission Date: 4/12/2025  Length of Stay: 3 days  Attending Physician: Joao Villegas III, MD  Primary Care Provider: Joao Villegas III, MD        Subjective     Principal Problem:Decompensated cirrhosis        HPI:  Patient 69-year-old male history of AFib, cirrhosis, hypertension came to the ED with complaints of increased swelling to testicular region, lower extremity, patient had been seen previously for same issue, patient is on Lasix and Aldactone, increased pain due to swelling, associated orthopnea and dyspnea on exertion reported no nausea vomiting reported, patient denied any fever chills    Overview/Hospital Course:  4/14 FM:  Patient known to me, patient presented to the emergency department with an acute 6 lb weight gain severe painful scrotal edema and dyspnea.  Patient is on an IV Lasix drip with some response.  Scrotum is still massive today.  Patient is not taking anticoagulation because of his thrombocytopenia and cirrhosis cardiology has been consulted patient also had a planned surgical removal of his tunneled dialysis catheter in the right chest for this week which may be on hold depending on how he responds to treatment this admission.  I have contacted surgery.  4/15 FM:  Patient is responding to the Lasix drip and had decent urine output, patient's scrotal edema has reduced about 30%.  Patient's electrolytes noted we will begin replacing potassium and magnesium as we diurese patient's midodrine was discontinued and I have explained to him that he will likely have chronic hypotension and once we get to a adequate volume level we will see how he tolerates his blood pressures.  We will likely require a prolonged hospitalization.  4/16 FM:  Patient is diuresing slowly, patient's weight has not changed much on measurement but question accuracy.  Patient's  "scrotal edema is improved some but still persist.  Patient's creatinine is down on IV Lasix drip, we will consult patient's nephrologist to follow along.  Leaving Vas-Cath in place for now.  Patient's getting out of bed and working with therapy which I have continued to encourage.  Midodrine is on hold which I do suspect was contributing to volume retention.  4/17 FM:  Patient's diuresing well and has had a significant improvement in the last 24 hours.  Patient's electrolytes noted continue repletion continue hospitalization and IV Lasix drip for another 24 hours.  We will likely transition to p.o. in a.m..  Mental status seems to be improved keep working with therapy.    Past Medical History:   Diagnosis Date    Atrial fibrillation     Bulging disc     Cirrhosis     Colon polyp 12/29/2017    Rpt 5 yrs    Encounter for blood transfusion     EV (esophageal varices)     Hypertension 03/30/2023    Renal disorder     Skin cancer 03/2017    'NOSE"    Thrombocytopenia        Past Surgical History:   Procedure Laterality Date    CATHETERIZATION OF BOTH LEFT AND RIGHT HEART N/A 02/08/2023    Procedure: CATHETERIZATION, HEART, BOTH LEFT AND RIGHT;  Surgeon: Flo Malone MD;  Location: [facility] CATH LAB;  Service: Cardiology;  Laterality: N/A;  low bleeding risk 1.0%    CHOLECYSTECTOMY      COLONOSCOPY      COLONOSCOPY N/A 12/29/2017    ta rpt 2022    CORONARY ANGIOGRAPHY N/A 02/08/2023    Procedure: ANGIOGRAM, CORONARY ARTERY;  Surgeon: Flo Malone MD;  Location: [facility] CATH LAB;  Service: Cardiology;  Laterality: N/A;    HERNIA REPAIR      INCISION AND DRAINAGE Right 02/27/2025    Procedure: Incision and Drainage, thigh;  Surgeon: Kayla Foster MD;  Location: [facility] OR;  Service: General;  Laterality: Right;  Plan to go first if pt has cardiac clearance - SB    SKIN BIOPSY  03/2017    TONSILLECTOMY      UPPER GASTROINTESTINAL ENDOSCOPY  2011    EV    UPPER GASTROINTESTINAL ENDOSCOPY  2016    VASECTOMY         Review " of patient's allergies indicates:  No Known Allergies    No current facility-administered medications on file prior to encounter.     Current Outpatient Medications on File Prior to Encounter   Medication Sig    famotidine (PEPCID) 20 MG tablet Take 1 tablet (20 mg total) by mouth once daily.    furosemide (LASIX) 80 MG tablet Take 1 tablet (80 mg total) by mouth 2 (two) times a day for 3 days, THEN 1 tablet (80 mg total) once daily.    lactulose (CHRONULAC) 20 gram/30 mL Soln Take 45 mLs (30 g total) by mouth 3 (three) times daily.    magnesium chloride (MAG 64) 64 mg TbEC Take 2 tablets (128 mg total) by mouth once daily.    metoprolol tartrate (LOPRESSOR) 25 MG tablet TAKE 0.5 TABLETS BY MOUTH 2 TIMES DAILY.    midodrine (PROAMATINE) 5 MG Tab Take 3 tablets (15 mg total) by mouth 3 (three) times daily with meals.    potassium bicarbonate (K-LYTE) disintegrating tablet Take 1 tablet (25 mEq total) by mouth 2 (two) times a day.    spironolactone (ALDACTONE) 25 MG tablet Take 1 tablet (25 mg total) by mouth 2 (two) times daily.    tamsulosin (FLOMAX) 0.4 mg Cap Take 1 capsule (0.4 mg total) by mouth once daily.    thiamine 100 MG tablet Take 100 mg by mouth once daily.    apixaban (ELIQUIS) 5 mg Tab Take 5 mg by mouth 2 (two) times daily.     Family History       Problem Relation (Age of Onset)    Alzheimer's disease Mother    Cancer Maternal Uncle    Heart disease Maternal Uncle    Hyperlipidemia Brother    No Known Problems Father    Ovarian cancer Sister          Tobacco Use    Smoking status: Never     Passive exposure: Never    Smokeless tobacco: Never   Substance and Sexual Activity    Alcohol use: Not Currently     Alcohol/week: 0.0 standard drinks of alcohol    Drug use: No    Sexual activity: Not Currently     Review of Systems   Constitutional:  Positive for fatigue. Negative for chills and fever.   HENT:  Negative for congestion.    Eyes:  Negative for visual disturbance.   Respiratory:  Positive for  shortness of breath. Negative for wheezing.    Cardiovascular:  Positive for leg swelling. Negative for chest pain and palpitations.   Gastrointestinal:  Positive for abdominal distention. Negative for abdominal pain, constipation, diarrhea, nausea and vomiting.   Endocrine: Negative for polyuria.   Genitourinary:  Positive for scrotal swelling (Scrotal edema). Negative for dysuria.   Musculoskeletal:  Positive for arthralgias. Negative for back pain and gait problem.   Skin:  Negative for wound.   Neurological:  Positive for weakness.   Psychiatric/Behavioral:  The patient is not nervous/anxious.      Objective:     Vital Signs (Most Recent):  Temp: 98.2 °F (36.8 °C) (04/17/25 0518)  Pulse: 100 (04/17/25 0758)  Resp: 16 (04/17/25 0518)  BP: 110/63 (04/17/25 0518)  SpO2: 95 % (04/17/25 0518) Vital Signs (24h Range):  Temp:  [98.1 °F (36.7 °C)-98.3 °F (36.8 °C)] 98.2 °F (36.8 °C)  Pulse:  [] 100  Resp:  [16-20] 16  SpO2:  [95 %-99 %] 95 %  BP: ()/(51-63) 110/63     Weight: (!) 140.7 kg (310 lb 3.2 oz)  Body mass index is 40.93 kg/m².     Physical Exam  Vitals and nursing note reviewed.   Constitutional:       General: He is not in acute distress.     Appearance: He is well-developed. He is obese. He is ill-appearing. He is not diaphoretic.   HENT:      Head: Normocephalic and atraumatic.      Nose: No congestion or rhinorrhea.   Eyes:      General: Scleral icterus present.         Right eye: No discharge.         Left eye: No discharge.      Extraocular Movements: Extraocular movements intact.   Neck:      Thyroid: No thyromegaly.      Vascular: No JVD.   Cardiovascular:      Rate and Rhythm: Normal rate. Rhythm irregular.      Heart sounds: Normal heart sounds. No murmur heard.     No friction rub. No gallop.   Pulmonary:      Effort: No respiratory distress.      Breath sounds: No wheezing, rhonchi or rales.   Abdominal:      General: There is distension.      Tenderness: There is no abdominal  tenderness. There is no guarding or rebound.      Hernia: No hernia is present.   Genitourinary:     Comments: Scrotal edema ++ improved  Musculoskeletal:      Cervical back: Neck supple.      Right lower leg: Edema present.      Left lower leg: Edema present.   Neurological:      Mental Status: He is alert and oriented to person, place, and time. Mental status is at baseline.      Motor: Weakness present.   Psychiatric:         Behavior: Behavior normal.         Thought Content: Thought content normal.                Significant Labs: All pertinent labs within the past 24 hours have been reviewed.  Recent Lab Results         04/17/25  0646        Albumin 1.9       ALP 67       ALT 10       Anion Gap 8       AST 29       Baso # 0.05       Basophil % 1.1       BILIRUBIN TOTAL 3.5  Comment: For infants and newborns, interpretation of results should be based   on gestational age, weight and in agreement with clinical   observations.    Premature Infant recommended reference ranges:   0-24 hours:  <8.0 mg/dL   24-48 hours: <12.0 mg/dL   3-5 days:    <15.0 mg/dL   6-29 days:   <15.0 mg/dL       BUN 14       Calcium 8.1       Chloride 99       CO2 29       Creatinine 1.8       eGFR 40  Comment: Estimated GFR calculated using the CKD-EPI creatinine (2021) equation.       Eos # 0.16       Eos % 3.4       Glucose 99       Gran # (ANC) 3.08       Hematocrit 29.7       Hemoglobin 9.7       Immature Grans (Abs) 0.02  Comment: Mild elevation in immature granulocytes is non specific and can be seen in a variety of conditions including stress response, acute inflammation, trauma and pregnancy. Correlation with other laboratory and clinical findings is essential.       Immature Granulocytes 0.4       Lymph # 0.63       Lymph % 13.6       Magnesium  1.6       MCH 32.1       MCHC 32.7       MCV 98       Mono # 0.70       Mono % 15.1       MPV 12.7       Neut % 66.4       nRBC 0       Platelet Estimate Decreased       Platelet Count  45       Potassium 3.2       PROTEIN TOTAL 6.3       RBC 3.02       RDW 16.3       Sodium 136       WBC 4.64               Significant Imaging: I have reviewed all pertinent imaging results/findings within the past 24 hours.      Assessment & Plan  Decompensated cirrhosis  Patient with known Cirrhosis with Child's class Calculator for Child's score link- [email or web address]-mortality#creator-insights. Co-morbidities are present and inclusive of esophageal varices, hepatic encephalopathy, and anemia/pancytopenia.  MELD-Na score calculated; MELD 3.0: 27 at 4/15/2025  5:36 AM  MELD-Na: 26 at 4/15/2025  5:36 AM  Calculated from:  Serum Creatinine: 2.2 mg/dL at 4/15/2025  5:36 AM  Serum Sodium: 135 mmol/L at 4/15/2025  5:36 AM  Total Bilirubin: 3.5 mg/dL at 4/15/2025  5:36 AM  Serum Albumin: 2 g/dL at 4/15/2025  5:36 AM  INR(ratio): 1.7 at 4/13/2025 12:42 PM  Age at listing (hypothetical): 69 years  Sex: Male at 4/15/2025  5:36 AM      Continue chronic meds. Etiology likely ETOH. Will avoid any hepatotoxic meds, and monitor CBC/CMP/INR for synthetic function.     4/16 FM:  Slowly improving.  Cirrhosis  Patient with known Cirrhosis with Child's class Calculator for Child's score link- [email or web address]-score-cirrhosis-mortality#creator-insights. Co-morbidities are present and inclusive of ascites and anemia/pancytopenia.  MELD-Na score calculated; MELD 3.0: 27 at 4/15/2025  5:36 AM  MELD-Na: 26 at 4/15/2025  5:36 AM  Calculated from:  Serum Creatinine: 2.2 mg/dL at 4/15/2025  5:36 AM  Serum Sodium: 135 mmol/L at 4/15/2025  5:36 AM  Total Bilirubin: 3.5 mg/dL at 4/15/2025  5:36 AM  Serum Albumin: 2 g/dL at 4/15/2025  5:36 AM  INR(ratio): 1.7 at 4/13/2025 12:42 PM  Age at listing (hypothetical): 69 years  Sex: Male at 4/15/2025  5:36 AM      Continue chronic meds. Etiology likely ETOH. Will avoid any hepatotoxic meds, and monitor CBC/CMP/INR for synthetic  function.   Patient reported     4/15 FM:  DC above, stopping midrodine.  Longstanding persistent atrial fibrillation  Patient has persistent (7 days or more) atrial fibrillation. Patient is currently in atrial fibrillation. [id number] Score: 1. The patients heart rate in the last 24 hours is as follows:  Pulse  Min: 81  Max: 100     Antiarrhythmics  metoprolol tartrate (LOPRESSOR) tablet 50 mg, 2 times daily, Oral    Anticoagulants       Plan  - Replete lytes with a goal of K>4, Mg >2  - Patient is not anticoagulated due to thrombocytopenia   - Patient's afib is currently uncontrolled. Will adjust treatment as follows: adjust lopressor dose  - cardiology consulted    4/14 FM:  No anticoags 2nd CLD, Plt Count.  4/15 FM:  Cont. Txt plan.  Thrombocytopenia  The likely etiology of thrombocytopenia is liver disease. The patients 3 most recent labs are listed below.  Recent Labs     04/15/25  0536 04/16/25  0532 04/17/25  0646   PLT 67* 73* 45*     Plan  - Will transfuse if platelet count is <20k.    Scrotal edema  4/14 FM:  Not much change overnight, cont IV lasix drip.  4/15 FM:  Improving slowly, echo noted.  4/16 FM:  Slowly improving.  4/17 FM:  Much improved today.    Anasarca  2/2 to cirrhosis, diuresis on board, replete electrolytes, monitor urine output    4/14 FM:  Stopping midrodine today, possible related to volume retention.  4/15 FM:  Slowly improving, need to establish lowest tolerable dry weight.  4/16 FM:  Slowly improving.  4/17 FM:  Cont to improve.  CKD (chronic kidney disease) stage 4, GFR 15-29 ml/min  Creatine stable for now. BMP reviewed- noted Estimated Creatinine Clearance: 55.5 mL/min (A) (based on SCr of 1.8 mg/dL (H)). according to latest data. Based on current GFR, CKD stage is stage 4 - GFR 15-29.  Monitor UOP and serial BMP and adjust therapy as needed. Renally dose meds. Avoid nephrotoxic medications and procedures.    4/14 FM:  Will not DC tunneled cath just yet.  4/15 FM:  Patient is  responding to lasix drip.  4/16 FM:  Slowly improving.  4/17 FM:  Improving with diuresis.  Hepatic encephalopathy  4/14 FM:  Stable.  4/15 FM:  Cont current lactulose, adding xifaxin.    VTE Risk Mitigation (From admission, onward)           Ordered     IP VTE HIGH RISK PATIENT  Once         04/12/25 2327     Place sequential compression device  Until discontinued         04/12/25 2327     Place ANDREW hose  Until discontinued         04/12/25 2327                    Discharge Planning   AUTUMN:      Code Status: Full Code   Medical Readiness for Discharge Date:   Discharge Plan A: Home with family, Home Health                        Joao Villegas III, MD  Department of Hospital Medicine   [facility]

## 2025-04-17 NOTE — SUBJECTIVE & OBJECTIVE
"Past Medical History:   Diagnosis Date    Atrial fibrillation     Bulging disc     Cirrhosis     Colon polyp 12/29/2017    Rpt 5 yrs    Encounter for blood transfusion     EV (esophageal varices)     Hypertension 03/30/2023    Renal disorder     Skin cancer 03/2017    'NOSE"    Thrombocytopenia        Past Surgical History:   Procedure Laterality Date    CATHETERIZATION OF BOTH LEFT AND RIGHT HEART N/A 02/08/2023    Procedure: CATHETERIZATION, HEART, BOTH LEFT AND RIGHT;  Surgeon: Flo Malone MD;  Location: [facility] CATH LAB;  Service: Cardiology;  Laterality: N/A;  low bleeding risk 1.0%    CHOLECYSTECTOMY      COLONOSCOPY      COLONOSCOPY N/A 12/29/2017    ta rpt 2022    CORONARY ANGIOGRAPHY N/A 02/08/2023    Procedure: ANGIOGRAM, CORONARY ARTERY;  Surgeon: Flo Malone MD;  Location: [facility] CATH LAB;  Service: Cardiology;  Laterality: N/A;    HERNIA REPAIR      INCISION AND DRAINAGE Right 02/27/2025    Procedure: Incision and Drainage, thigh;  Surgeon: Kayla Foster MD;  Location: [facility];  Service: General;  Laterality: Right;  Plan to go first if pt has cardiac clearance - SB    SKIN BIOPSY  03/2017    TONSILLECTOMY      UPPER GASTROINTESTINAL ENDOSCOPY  2011    EV    UPPER GASTROINTESTINAL ENDOSCOPY  2016    VASECTOMY         Review of patient's allergies indicates:  No Known Allergies    No current facility-administered medications on file prior to encounter.     Current Outpatient Medications on File Prior to Encounter   Medication Sig    famotidine (PEPCID) 20 MG tablet Take 1 tablet (20 mg total) by mouth once daily.    furosemide (LASIX) 80 MG tablet Take 1 tablet (80 mg total) by mouth 2 (two) times a day for 3 days, THEN 1 tablet (80 mg total) once daily.    lactulose (CHRONULAC) 20 gram/30 mL Soln Take 45 mLs (30 g total) by mouth 3 (three) times daily.    magnesium chloride (MAG 64) 64 mg TbEC Take 2 tablets (128 mg total) by mouth once daily.    metoprolol tartrate (LOPRESSOR) 25 MG tablet " TAKE 0.5 TABLETS BY MOUTH 2 TIMES DAILY.    midodrine (PROAMATINE) 5 MG Tab Take 3 tablets (15 mg total) by mouth 3 (three) times daily with meals.    potassium bicarbonate (K-LYTE) disintegrating tablet Take 1 tablet (25 mEq total) by mouth 2 (two) times a day.    spironolactone (ALDACTONE) 25 MG tablet Take 1 tablet (25 mg total) by mouth 2 (two) times daily.    tamsulosin (FLOMAX) 0.4 mg Cap Take 1 capsule (0.4 mg total) by mouth once daily.    thiamine 100 MG tablet Take 100 mg by mouth once daily.    apixaban (ELIQUIS) 5 mg Tab Take 5 mg by mouth 2 (two) times daily.     Family History       Problem Relation (Age of Onset)    Alzheimer's disease Mother    Cancer Maternal Uncle    Heart disease Maternal Uncle    Hyperlipidemia Brother    No Known Problems Father    Ovarian cancer Sister          Tobacco Use    Smoking status: Never     Passive exposure: Never    Smokeless tobacco: Never   Substance and Sexual Activity    Alcohol use: Not Currently     Alcohol/week: 0.0 standard drinks of alcohol    Drug use: No    Sexual activity: Not Currently     Review of Systems   Constitutional:  Positive for fatigue. Negative for chills and fever.   HENT:  Negative for congestion.    Eyes:  Negative for visual disturbance.   Respiratory:  Positive for shortness of breath. Negative for wheezing.    Cardiovascular:  Positive for leg swelling. Negative for chest pain and palpitations.   Gastrointestinal:  Positive for abdominal distention. Negative for abdominal pain, constipation, diarrhea, nausea and vomiting.   Endocrine: Negative for polyuria.   Genitourinary:  Positive for scrotal swelling (Scrotal edema). Negative for dysuria.   Musculoskeletal:  Positive for arthralgias. Negative for back pain and gait problem.   Skin:  Negative for wound.   Neurological:  Positive for weakness.   Psychiatric/Behavioral:  The patient is not nervous/anxious.      Objective:     Vital Signs (Most Recent):  Temp: 98.2 °F (36.8 °C)  (04/17/25 0518)  Pulse: 100 (04/17/25 0758)  Resp: 16 (04/17/25 0518)  BP: 110/63 (04/17/25 0518)  SpO2: 95 % (04/17/25 0518) Vital Signs (24h Range):  Temp:  [98.1 °F (36.7 °C)-98.3 °F (36.8 °C)] 98.2 °F (36.8 °C)  Pulse:  [] 100  Resp:  [16-20] 16  SpO2:  [95 %-99 %] 95 %  BP: ()/(51-63) 110/63     Weight: (!) 140.7 kg (310 lb 3.2 oz)  Body mass index is 40.93 kg/m².     Physical Exam  Vitals and nursing note reviewed.   Constitutional:       General: He is not in acute distress.     Appearance: He is well-developed. He is obese. He is ill-appearing. He is not diaphoretic.   HENT:      Head: Normocephalic and atraumatic.      Nose: No congestion or rhinorrhea.   Eyes:      General: Scleral icterus present.         Right eye: No discharge.         Left eye: No discharge.      Extraocular Movements: Extraocular movements intact.   Neck:      Thyroid: No thyromegaly.      Vascular: No JVD.   Cardiovascular:      Rate and Rhythm: Normal rate. Rhythm irregular.      Heart sounds: Normal heart sounds. No murmur heard.     No friction rub. No gallop.   Pulmonary:      Effort: No respiratory distress.      Breath sounds: No wheezing, rhonchi or rales.   Abdominal:      General: There is distension.      Tenderness: There is no abdominal tenderness. There is no guarding or rebound.      Hernia: No hernia is present.   Genitourinary:     Comments: Scrotal edema ++ improved  Musculoskeletal:      Cervical back: Neck supple.      Right lower leg: Edema present.      Left lower leg: Edema present.   Neurological:      Mental Status: He is alert and oriented to person, place, and time. Mental status is at baseline.      Motor: Weakness present.   Psychiatric:         Behavior: Behavior normal.         Thought Content: Thought content normal.                Significant Labs: All pertinent labs within the past 24 hours have been reviewed.  Recent Lab Results         04/17/25  0646        Albumin 1.9       ALP 67        ALT 10       Anion Gap 8       AST 29       Baso # 0.05       Basophil % 1.1       BILIRUBIN TOTAL 3.5  Comment: For infants and newborns, interpretation of results should be based   on gestational age, weight and in agreement with clinical   observations.    Premature Infant recommended reference ranges:   0-24 hours:  <8.0 mg/dL   24-48 hours: <12.0 mg/dL   3-5 days:    <15.0 mg/dL   6-29 days:   <15.0 mg/dL       BUN 14       Calcium 8.1       Chloride 99       CO2 29       Creatinine 1.8       eGFR 40  Comment: Estimated GFR calculated using the CKD-EPI creatinine (2021) equation.       Eos # 0.16       Eos % 3.4       Glucose 99       Gran # (ANC) 3.08       Hematocrit 29.7       Hemoglobin 9.7       Immature Grans (Abs) 0.02  Comment: Mild elevation in immature granulocytes is non specific and can be seen in a variety of conditions including stress response, acute inflammation, trauma and pregnancy. Correlation with other laboratory and clinical findings is essential.       Immature Granulocytes 0.4       Lymph # 0.63       Lymph % 13.6       Magnesium  1.6       MCH 32.1       MCHC 32.7       MCV 98       Mono # 0.70       Mono % 15.1       MPV 12.7       Neut % 66.4       nRBC 0       Platelet Estimate Decreased       Platelet Count 45       Potassium 3.2       PROTEIN TOTAL 6.3       RBC 3.02       RDW 16.3       Sodium 136       WBC 4.64               Significant Imaging: I have reviewed all pertinent imaging results/findings within the past 24 hours.

## 2025-04-17 NOTE — ASSESSMENT & PLAN NOTE
Creatine stable for now. BMP reviewed- noted Estimated Creatinine Clearance: 55.5 mL/min (A) (based on SCr of 1.8 mg/dL (H)). according to latest data. Based on current GFR, CKD stage is stage 4 - GFR 15-29.  Monitor UOP and serial BMP and adjust therapy as needed. Renally dose meds. Avoid nephrotoxic medications and procedures.    4/14 FM:  Will not DC tunneled cath just yet.  4/15 FM:  Patient is responding to lasix drip.  4/16 FM:  Slowly improving.  4/17 FM:  Improving with diuresis.

## 2025-04-17 NOTE — PLAN OF CARE
Problem: Adult Inpatient Plan of Care  Goal: Plan of Care Review  Outcome: Progressing  Goal: Patient-Specific Goal (Individualized)  Outcome: Progressing  Goal: Absence of Hospital-Acquired Illness or Injury  Outcome: Progressing  Goal: Optimal Comfort and Wellbeing  Outcome: Progressing  Goal: Readiness for Transition of Care  Outcome: Progressing     Problem: Acute Kidney Injury/Impairment  Goal: Fluid and Electrolyte Balance  Outcome: Progressing  Goal: Improved Oral Intake  Outcome: Progressing  Goal: Effective Renal Function  Outcome: Progressing     Problem: Infection  Goal: Absence of Infection Signs and Symptoms  Outcome: Progressing     Problem: Wound  Goal: Optimal Coping  Outcome: Progressing  Goal: Optimal Functional Ability  Outcome: Progressing  Goal: Absence of Infection Signs and Symptoms  Outcome: Progressing  Goal: Improved Oral Intake  Outcome: Progressing  Goal: Optimal Pain Control and Function  Outcome: Progressing  Goal: Skin Health and Integrity  Outcome: Progressing  Goal: Optimal Wound Healing  Outcome: Progressing     Problem: Fall Injury Risk  Goal: Absence of Fall and Fall-Related Injury  Outcome: Progressing     Problem: Skin Injury Risk Increased  Goal: Skin Health and Integrity  Outcome: Progressing     Problem: Bariatric Environmental Safety  Goal: Safety Maintained with Care  Outcome: Progressing

## 2025-04-17 NOTE — PROGRESS NOTES
[facility] [address]  Cardiology  Progress Note    Patient Name: Juan Carlos Yoo Sr.  MRN: [id number]  Admission Date: 4/12/2025  Hospital Length of Stay: 9 days  Code Status: Full Code   Attending Physician: Joao Villegas III, MD   Primary Care Physician: Joao Villegas III, MD  Expected Discharge Date:   Principal Problem:Decompensated cirrhosis    Subjective:     Hospital Course:       Patient is a 68 yo male with a history of chronic atrial fibrillation, chronic diastolic heart failure, cirrhosis, and prior need for dialysis presenting with worsening bilateral lower extremity edema and scrotal swelling. Patient reports waking up recently with increased swelling that progressively worsened, particularly affecting his genitals, causing significant discomfort with ambulation. He presented to the ED yesterday and was admitted. He reports compliance with his medications including Lasix and spironolactone, though occasionally taking doses 1-2 hours late. He endorses mild intermittent exertional dyspnea, notably while walking to his truck, but denies orthopnea. Denies fever. Patient has a dialysis port scheduled for removal this Wednesday, with pre-op clearance obtained from cardiology last Friday. Recent echocardiogram in December showed preserved cardiac function with known atrial fibrillation. EKG from the 21st confirmed chronic A-fib. Patient denies palpitations or awareness of irregular rhythm. 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 Denies history of smoking or illicit drug use. No prior cardiac stents.    Interval History:     4/14/25: Patient reports improved shortness of breath. He reports stable scrotal and bilateral lower extremity edema. Telemetry this morning shows atrial fibrillation with a controlled rate. -156 mL urine output since yesterday. Hemodynamics well controlled overnight. Electrolytes stable this morning.  Renal function worsening since admission.     4/15/25:  Patient reports improved scrotal edema and bilateral lower extremity edema. He denies shortness of breath and chest pain. Telemetry this morning shows atrial fibrillation with a heart rate in the 80s- 90s. -2450 mL urine output recorded since yesterday. Hemodynamics well controlled overnight. Electrolytes stable. Renal function continuing to worsen with a creatinine of 2.2 and GFR of 31. Liver function stable.     4/16/25: Patient reports improved scrotal and bilateral lower extremity edema. He denies chest pain and shortness of breath. Telemetry this morning shows atrial fibrillation with a heart rate varying from 90s- 120s.  -1900 mL urine output since yesterday. Hypotension noted this morning with a blood pressure of 99/50. Improving kidney function.     4/17/25: Patient continues to report improved scrotal and bilateral lower extremity edema. He denies chest pain and shortness of breath. Telemetry this morning shows atrial fibrillation with a rate in the 90s. -2500 mL urine output since yesterday. Hypotension noted overnight with blood pressure in the 90s/ 50s. Kidney function continuing to improve.     Review of Systems   Constitutional: Negative for chills and fever.   HENT: Negative.     Cardiovascular:  Positive for leg swelling. Negative for chest pain and palpitations.   Respiratory:  Positive for shortness of breath. Negative for cough and wheezing.    Gastrointestinal:  Positive for bloating. Negative for abdominal pain, nausea and vomiting.   Genitourinary:         Positive for scrotal edema     Objective:     Vital Signs (Most Recent):  Temp: 98 °F (36.7 °C) (04/23/25 1113)  Pulse: 89 (04/23/25 1113)  Resp: 18 (04/23/25 1113)  BP: (!) 111/56 (04/23/25 1113)  SpO2: 96 % (04/23/25 1113) Vital Signs (24h Range):  Temp:  [97.8 °F (36.6 °C)-98.4 °F (36.9 °C)] 98 °F (36.7 °C)  Pulse:  [77-91] 89  Resp:  [16-18] 18  SpO2:  [91 %-99 %] 96 %  BP:  ()/(51-63) 111/56     Weight: 126 kg (277 lb 12.8 oz)  Body mass index is 36.65 kg/m².    SpO2: 96 %         Intake/Output Summary (Last 24 hours) at 4/23/2025 1543  Last data filed at 4/23/2025 0715  Gross per 24 hour   Intake 240 ml   Output 4150 ml   Net -3910 ml       Lines/Drains/Airways       Central Venous Catheter Line  Duration                  Hemodialysis Catheter 12/16/24 1152 right internal jugular 128 days              Drain  Duration                  Open Drain 02/27/25 0815 Tube - 1 Right Thigh Penrose 1/4 inch 55 days              Peripheral Intravenous Line  Duration                  Peripheral IV - Single Lumen 04/20/25 0030 20 G Anterior;Left Forearm 3 days                    Physical Exam  Constitutional:       General: He is not in acute distress.     Appearance: He is obese.   HENT:      Head: Normocephalic and atraumatic.   Eyes:      Extraocular Movements: Extraocular movements intact.   Cardiovascular:      Pulses: Normal pulses.      Heart sounds: Normal heart sounds.   Pulmonary:      Effort: Pulmonary effort is normal.      Breath sounds: Normal breath sounds.   Abdominal:      General: There is distension.   Musculoskeletal:      Cervical back: Normal range of motion and neck supple.      Right lower leg: Edema present.      Left lower leg: Edema present.   Skin:     General: Skin is warm and dry.      Capillary Refill: Capillary refill takes less than 2 seconds.   Neurological:      Mental Status: He is alert. Mental status is at baseline.   Psychiatric:         Mood and Affect: Mood normal.         Behavior: Behavior normal.         Significant Labs: BMP:   Recent Labs   Lab 04/22/25  0645 04/23/25  0546   * 134*   K 3.2* 3.7   CL 90* 88*   CO2 34* 38*   BUN 26* 28*   CREATININE 2.0* 1.9*   CALCIUM 8.7 8.8   MG 1.5* 1.4*   , CMP   Recent Labs   Lab 04/22/25  0645 04/23/25  0546   * 134*   K 3.2* 3.7   CL 90* 88*   CO2 34* 38*   BUN 26* 28*   CREATININE 2.0* 1.9*  "  CALCIUM 8.7 8.8   ALBUMIN 1.9* 1.9*   BILITOT 3.3* 3.0*   ALKPHOS 68 73   AST 28 28   ALT 9* 8*   ANIONGAP 9 8   , CBC   Recent Labs   Lab 04/22/25  0645 04/23/25  0546   WBC 5.18 5.44   HGB 9.8* 9.9*   HCT 29.6* 29.9*   PLT 79* 79*   , Lipid Panel No results for input(s): "CHOL", "HDL", "LDLCALC", "TRIG", "CHOLHDL" in the last 48 hours., Troponin No results for input(s): "TROPONINIHS" in the last 48 hours., and All pertinent lab results from the last 24 hours have been reviewed.    Significant Imaging:     Echocardiogram: Transthoracic echo (TTE) complete (Cupid Only):   Results for orders placed or performed during the hospital encounter of 04/12/25   Echo   Result Value Ref Range    BSA 2.65 m2    LVOT stroke volume 60.9 cm3    LVIDd 5.3 3.5 - 6.0 cm    LV Systolic Volume 58 mL    LV Systolic Volume Index 22.7 mL/m2    LVIDs 3.7 2.1 - 4.0 cm    LV Diastolic Volume 135 mL    LV Diastolic Volume Index 52.73 mL/m2    Left Ventricular End Systolic Volume by Teichholz Method 57.57 mL    Left Ventricular End Diastolic Volume by Teichholz Method 134.91 mL    IVS 1.1 0.6 - 1.1 cm    LVOT diameter 2.1 cm    LVOT area 3.5 cm2    FS 30.2 28 - 44 %    Left Ventricle Relative Wall Thickness 0.34 cm    PW 0.9 0.6 - 1.1 cm    LV mass 200.4 g    LV Mass Index 78.3 g/m2    MV Peak E Owen 1.42 m/s    TDI LATERAL 0.11 m/s    TDI SEPTAL 0.13 m/s    E/E' ratio 12 m/s    TR Max Owen 3.2 m/s    E wave deceleration time 208 msec    LV SEPTAL E/E' RATIO 10.9 m/s    LV LATERAL E/E' RATIO 12.9 m/s    LVOT peak owen 0.8 m/s    Left Ventricular Outflow Tract Mean Velocity 0.57 cm/s    Left Ventricular Outflow Tract Mean Gradient 1.46 mmHg    RV- wilson basal diam 3.6 cm    RV/LV Ratio 0.68 cm    LA size 6.0 cm    Left Atrium Major Axis 7.3 cm    RA Major Axis 6.50 cm    AV mean gradient 4 mmHg    AV peak gradient 7 mmHg    Ao peak owen 1.3 m/s    Ao VTI 27.5 cm    LVOT peak VTI 17.6 cm    AV valve area 2.2 cm²    AV Velocity Ratio 0.62     AV " index (prosthetic) 0.64     TONY by Velocity Ratio 2.1 cm²    Mr max rodney 4.58 m/s    MV stenosis pressure 1/2 time 60.45 ms    MV valve area p 1/2 method 3.64 cm2    Triscuspid Valve Regurgitation Peak Gradient 41 mmHg    RVOT peak rodney 0.58 m/s    Ao root annulus 3.39 cm    IVC diameter 3.29 cm    Mean e' 0.12 m/s    ZLVIDS -7.60     ZLVIDD -11.30     RVDD 3.60 cm    AORTIC VALVE CUSP SEPERATION 1.15 cm    TAPSE 1.80 cm    TV resting pulmonary artery pressure 56 mmHg    RV TB RVSP 18 mmHg    Est. RA pres 15 mmHg    Narrative      Left Ventricle: The left ventricle is normal in size. Mildly increased   wall thickness. There is normal systolic function with a visually   estimated ejection fraction of 55 - 60%. Unable to assess diastolic   function due to atrial fibrillation.    Right Ventricle: The right ventricle has moderate enlargement. Systolic   function is normal.    Left Atrium: Severely dilated    Right Atrium: Right atrium is moderately dilated.    Aortic Valve: The aortic valve is a trileaflet valve. There is mild   aortic valve sclerosis.    Mitral Valve: There is mild regurgitation.    Tricuspid Valve: There is mild to moderate regurgitation.    IVC/SVC: Elevated venous pressure at 15 mmHg.    Left pleural effusion.          Assessment and Plan:    Acute exacerbation of HFpEF: Patient presented to Emergency Department with SOB, scrotal edema, and BLE edema. He was started on IV furosemide. This morning, he reports improving SOB. He also reports improving scrotal and BLE edema; however, not at baseline.             - Continue IV Lasix as dosed            - Continue spironolactone as dosed            - Monitor daily I's & O's            - Low Na+ diet            - Recommend nephrology consult given CKD            - Echocardiogram shows normal LV systolic function with an EF of 55- 60%. It also shows mild MR and moderate TR.      Atrial Fibrillation: Patient with history of atrial fibrillation. Telemetry this  morning continues to show atrial fibrillation with a controlled rate. He is not currently on anticoagulation given cirrhosis.            - Continue metoprolol tartrate as dosed           - Continue telemetry           - Agree with no anticoagulation given cirrhosis     Cirrhosis: Management per primary team          Active Diagnoses:    Diagnosis Date Noted POA    PRINCIPAL PROBLEM:  Decompensated cirrhosis [K72.90, K74.60] 10/04/2024 Yes    Scrotal edema [N50.89] 04/13/2025 Yes    Anasarca [R60.1] 04/13/2025 Yes    Thrombocytopenia [D69.6] 11/21/2024 Yes    Hepatic encephalopathy [K76.82] 11/21/2024 Yes    CKD (chronic kidney disease) stage 4, GFR 15-29 ml/min [N18.4] 11/20/2024 Yes    Longstanding persistent atrial fibrillation [I48.11] 11/29/2022 Yes    Cirrhosis [K74.60] 07/24/2014 Yes      Problems Resolved During this Admission:       VTE Risk Mitigation (From admission, onward)           Ordered     IP VTE HIGH RISK PATIENT  Once         04/12/25 2327     Place sequential compression device  Until discontinued         04/12/25 2327     Place ANDREW hose  Until discontinued         04/12/25 2327                  Provider's Attestation:  I, Luis Liu MD, confirm that OCTAVIA Starr worked under my direction at all times.  Documentation shall reflect findings and decisions made by myself in the course of the patient's treatment.  I have performed a face to face evaluation of the patient and reviewed the medical record. In addition, I have performed the substantive portion of the medical decision making. I have reviewed the notes and assessment, and I concur with her documentation.  CMS guidelines regarding the use of scribes shall be adhered to.  MD Luis Lewis MD  Cardiology  [facility]

## 2025-04-17 NOTE — PLAN OF CARE
Plan of care reviewed with patient. Peripheral IV in place and infusing Lasix @ 5 ml/hr. Pt tolerated meds well. VSS. NADN. Purewick and telemetry connections intact. Right forearm noted to be swollen and weeping, dressing change completed. Pt free from falls and injury. Questions and concerns addressed. Pt belongings and call bell within reach. No further requests or concerns at this time.   Problem: Adult Inpatient Plan of Care  Goal: Plan of Care Review  Outcome: Progressing  Goal: Patient-Specific Goal (Individualized)  Outcome: Progressing  Goal: Absence of Hospital-Acquired Illness or Injury  Outcome: Progressing  Goal: Optimal Comfort and Wellbeing  Outcome: Progressing  Goal: Readiness for Transition of Care  Outcome: Progressing     Problem: Acute Kidney Injury/Impairment  Goal: Fluid and Electrolyte Balance  Outcome: Progressing  Goal: Improved Oral Intake  Outcome: Progressing  Goal: Effective Renal Function  Outcome: Progressing     Problem: Infection  Goal: Absence of Infection Signs and Symptoms  Outcome: Progressing     Problem: Wound  Goal: Optimal Coping  Outcome: Progressing  Goal: Optimal Functional Ability  Outcome: Progressing  Goal: Absence of Infection Signs and Symptoms  Outcome: Progressing  Goal: Improved Oral Intake  Outcome: Progressing  Goal: Optimal Pain Control and Function  Outcome: Progressing  Goal: Skin Health and Integrity  Outcome: Progressing  Goal: Optimal Wound Healing  Outcome: Progressing     Problem: Skin Injury Risk Increased  Goal: Skin Health and Integrity  Outcome: Progressing     Problem: Fall Injury Risk  Goal: Absence of Fall and Fall-Related Injury  Outcome: Progressing     Problem: Bariatric Environmental Safety  Goal: Safety Maintained with Care  Outcome: Progressing

## 2025-04-17 NOTE — ASSESSMENT & PLAN NOTE
4/14 FM:  Not much change overnight, cont IV lasix drip.  4/15 FM:  Improving slowly, echo noted.  4/16 FM:  Slowly improving.  4/17 FM:  Much improved today.

## 2025-04-17 NOTE — ASSESSMENT & PLAN NOTE
2/2 to cirrhosis, diuresis on board, replete electrolytes, monitor urine output    4/14 FM:  Stopping midrodine today, possible related to volume retention.  4/15 FM:  Slowly improving, need to establish lowest tolerable dry weight.  4/16 FM:  Slowly improving.  4/17 FM:  Cont to improve.

## 2025-04-18 LAB
ABSOLUTE EOSINOPHIL (OHS): 0.2 K/UL
ABSOLUTE MONOCYTE (OHS): 0.76 K/UL (ref 0.3–1)
ABSOLUTE NEUTROPHIL COUNT (OHS): 3.08 K/UL (ref 1.8–7.7)
ALBUMIN SERPL BCP-MCNC: 1.9 G/DL (ref 3.5–5.2)
ALP SERPL-CCNC: 69 UNIT/L (ref 40–150)
ALT SERPL W/O P-5'-P-CCNC: 9 UNIT/L (ref 10–44)
ANION GAP (OHS): 6 MMOL/L (ref 8–16)
AST SERPL-CCNC: 28 UNIT/L (ref 11–45)
BASOPHILS # BLD AUTO: 0.04 K/UL
BASOPHILS NFR BLD AUTO: 0.8 %
BILIRUB SERPL-MCNC: 3.4 MG/DL (ref 0.1–1)
BUN SERPL-MCNC: 16 MG/DL (ref 8–23)
CALCIUM SERPL-MCNC: 7.9 MG/DL (ref 8.7–10.5)
CHLORIDE SERPL-SCNC: 97 MMOL/L (ref 95–110)
CO2 SERPL-SCNC: 31 MMOL/L (ref 23–29)
CREAT SERPL-MCNC: 1.7 MG/DL (ref 0.5–1.4)
ERYTHROCYTE [DISTWIDTH] IN BLOOD BY AUTOMATED COUNT: 16.1 % (ref 11.5–14.5)
GFR SERPLBLD CREATININE-BSD FMLA CKD-EPI: 43 ML/MIN/1.73/M2
GLUCOSE SERPL-MCNC: 92 MG/DL (ref 70–110)
HCT VFR BLD AUTO: 29.2 % (ref 40–54)
HGB BLD-MCNC: 9.7 GM/DL (ref 14–18)
IMM GRANULOCYTES # BLD AUTO: 0.02 K/UL (ref 0–0.04)
IMM GRANULOCYTES NFR BLD AUTO: 0.4 % (ref 0–0.5)
LYMPHOCYTES # BLD AUTO: 0.96 K/UL (ref 1–4.8)
MAGNESIUM SERPL-MCNC: 1.5 MG/DL (ref 1.6–2.6)
MCH RBC QN AUTO: 31.8 PG (ref 27–31)
MCHC RBC AUTO-ENTMCNC: 33.2 G/DL (ref 32–36)
MCV RBC AUTO: 96 FL (ref 82–98)
NUCLEATED RBC (/100WBC) (OHS): 0 /100 WBC
PLATELET # BLD AUTO: 52 K/UL (ref 150–450)
PMV BLD AUTO: 13 FL (ref 9.2–12.9)
POTASSIUM SERPL-SCNC: 3.2 MMOL/L (ref 3.5–5.1)
PROT SERPL-MCNC: 6 GM/DL (ref 6–8.4)
RBC # BLD AUTO: 3.05 M/UL (ref 4.6–6.2)
RELATIVE EOSINOPHIL (OHS): 4 %
RELATIVE LYMPHOCYTE (OHS): 19 % (ref 18–48)
RELATIVE MONOCYTE (OHS): 15 % (ref 4–15)
RELATIVE NEUTROPHIL (OHS): 60.8 % (ref 38–73)
SODIUM SERPL-SCNC: 134 MMOL/L (ref 136–145)
WBC # BLD AUTO: 5.06 K/UL (ref 3.9–12.7)

## 2025-04-18 PROCEDURE — 83735 ASSAY OF MAGNESIUM: CPT | Performed by: INTERNAL MEDICINE

## 2025-04-18 PROCEDURE — 25000003 PHARM REV CODE 250: Performed by: EMERGENCY MEDICINE

## 2025-04-18 PROCEDURE — 36415 COLL VENOUS BLD VENIPUNCTURE: CPT | Performed by: INTERNAL MEDICINE

## 2025-04-18 PROCEDURE — 85025 COMPLETE CBC W/AUTO DIFF WBC: CPT | Performed by: INTERNAL MEDICINE

## 2025-04-18 PROCEDURE — 80053 COMPREHEN METABOLIC PANEL: CPT | Performed by: INTERNAL MEDICINE

## 2025-04-18 PROCEDURE — 63600175 PHARM REV CODE 636 W HCPCS: Performed by: EMERGENCY MEDICINE

## 2025-04-18 PROCEDURE — 25000003 PHARM REV CODE 250: Performed by: INTERNAL MEDICINE

## 2025-04-18 PROCEDURE — 11000001 HC ACUTE MED/SURG PRIVATE ROOM

## 2025-04-18 RX ADMIN — FUROSEMIDE 10 MG/HR: 10 INJECTION, SOLUTION INTRAMUSCULAR; INTRAVENOUS at 11:04

## 2025-04-18 RX ADMIN — METOPROLOL TARTRATE 50 MG: 50 TABLET, FILM COATED ORAL at 09:04

## 2025-04-18 RX ADMIN — SPIRONOLACTONE 25 MG: 25 TABLET ORAL at 09:04

## 2025-04-18 RX ADMIN — MUPIROCIN: 20 OINTMENT TOPICAL at 09:04

## 2025-04-18 RX ADMIN — TAMSULOSIN HYDROCHLORIDE 0.4 MG: 0.4 CAPSULE ORAL at 09:04

## 2025-04-18 RX ADMIN — MAGNESIUM 64 MG (MAGNESIUM CHLORIDE) TABLET,DELAYED RELEASE 64 MG: at 09:04

## 2025-04-18 RX ADMIN — POTASSIUM BICARBONATE 20 MEQ: 391 TABLET, EFFERVESCENT ORAL at 09:04

## 2025-04-18 RX ADMIN — Medication 100 MG: at 09:04

## 2025-04-18 RX ADMIN — RIFAXIMIN 550 MG: 550 TABLET ORAL at 09:04

## 2025-04-18 RX ADMIN — LACTULOSE 20 G: 20 SOLUTION ORAL at 09:04

## 2025-04-18 RX ADMIN — FAMOTIDINE 20 MG: 20 TABLET, FILM COATED ORAL at 09:04

## 2025-04-18 NOTE — ASSESSMENT & PLAN NOTE
4/14 FM:  Stable.  4/15 FM:  Cont current lactulose, adding xifaxin.  4/18 FM:  Cont to improve.

## 2025-04-18 NOTE — ASSESSMENT & PLAN NOTE
4/14 FM:  Not much change overnight, cont IV lasix drip.  4/15 FM:  Improving slowly, echo noted.  4/16 FM:  Slowly improving.  4/17 FM:  Much improved today.  4/18 FM:  Cont to improve.

## 2025-04-18 NOTE — ASSESSMENT & PLAN NOTE
2/2 to cirrhosis, diuresis on board, replete electrolytes, monitor urine output    4/14 FM:  Stopping midrodine today, possible related to volume retention.  4/15 FM:  Slowly improving, need to establish lowest tolerable dry weight.  4/16 FM:  Slowly improving.  4/17 FM:  Cont to improve.  4/18 FM:  Cont to improve.

## 2025-04-18 NOTE — ASSESSMENT & PLAN NOTE
Creatine stable for now. BMP reviewed- noted Estimated Creatinine Clearance: 57.8 mL/min (A) (based on SCr of 1.7 mg/dL (H)). according to latest data. Based on current GFR, CKD stage is stage 4 - GFR 15-29.  Monitor UOP and serial BMP and adjust therapy as needed. Renally dose meds. Avoid nephrotoxic medications and procedures.    4/14 FM:  Will not DC tunneled cath just yet.  4/15 FM:  Patient is responding to lasix drip.  4/16 FM:  Slowly improving.  4/17 FM:  Improving with diuresis.  4/18 FM:  Cont to improve.

## 2025-04-18 NOTE — ASSESSMENT & PLAN NOTE
Patient with known Cirrhosis with Child's class Calculator for Child's score link- https://www.[employer].com/calc/340/child-ocampo-score-cirrhosis-mortality#creator-insights. Co-morbidities are present and inclusive of ascites and anemia/pancytopenia.  MELD-Na score calculated; MELD 3.0: 27 at 4/15/2025  5:36 AM  MELD-Na: 26 at 4/15/2025  5:36 AM  Calculated from:  Serum Creatinine: 2.2 mg/dL at 4/15/2025  5:36 AM  Serum Sodium: 135 mmol/L at 4/15/2025  5:36 AM  Total Bilirubin: 3.5 mg/dL at 4/15/2025  5:36 AM  Serum Albumin: 2 g/dL at 4/15/2025  5:36 AM  INR(ratio): 1.7 at 4/13/2025 12:42 PM  Age at listing (hypothetical): 69 years  Sex: Male at 4/15/2025  5:36 AM      Continue chronic meds. Etiology likely ETOH. Will avoid any hepatotoxic meds, and monitor CBC/CMP/INR for synthetic function.   Patient reported     4/15 FM:  DC above, stopping midrodine.

## 2025-04-18 NOTE — PLAN OF CARE
History of Present Illness


Admission Date/PCP: 





18


Patient complains of: abdominal pains


History of Present Illness: 


RISHI ESTRELLA is a 28 








Prenatal melissa


Plan of care reviewed and ongoing. Vital signs stable. Patient requested to remove purewick, patient educated on strict I&O's. Patient instructed to let staff know when patient voids to measure output. Denies any needs at this time, call bell in reach.     Problem: Adult Inpatient Plan of Care  Goal: Plan of Care Review  Outcome: Progressing  Goal: Patient-Specific Goal (Individualized)  Outcome: Progressing  Goal: Absence of Hospital-Acquired Illness or Injury  Outcome: Progressing  Goal: Optimal Comfort and Wellbeing  Outcome: Progressing  Goal: Readiness for Transition of Care  Outcome: Progressing     Problem: Acute Kidney Injury/Impairment  Goal: Fluid and Electrolyte Balance  Outcome: Progressing  Goal: Improved Oral Intake  Outcome: Progressing  Goal: Effective Renal Function  Outcome: Progressing     Problem: Infection  Goal: Absence of Infection Signs and Symptoms  Outcome: Progressing     Problem: Wound  Goal: Optimal Coping  Outcome: Progressing  Goal: Optimal Functional Ability  Outcome: Progressing  Goal: Absence of Infection Signs and Symptoms  Outcome: Progressing  Goal: Improved Oral Intake  Outcome: Progressing  Goal: Optimal Pain Control and Function  Outcome: Progressing  Goal: Skin Health and Integrity  Outcome: Progressing  Goal: Optimal Wound Healing  Outcome: Progressing     Problem: Skin Injury Risk Increased  Goal: Skin Health and Integrity  Outcome: Progressing     Problem: Fall Injury Risk  Goal: Absence of Fall and Fall-Related Injury  Outcome: Progressing     Problem: Bariatric Environmental Safety  Goal: Safety Maintained with Care  Outcome: Progressing

## 2025-04-18 NOTE — PROGRESS NOTES
[facility] Medicine  Progress Note    Patient Name: Juan Carlos Yoo Sr.  MRN: [id number]  Patient Class: IP- Inpatient   Admission Date: 4/12/2025  Length of Stay: 4 days  Attending Physician: Joao Villegas III, MD  Primary Care Provider: Joao Villegas III, MD        Subjective     Principal Problem:Decompensated cirrhosis        HPI:  Patient 69-year-old male history of AFib, cirrhosis, hypertension came to the ED with complaints of increased swelling to testicular region, lower extremity, patient had been seen previously for same issue, patient is on Lasix and Aldactone, increased pain due to swelling, associated orthopnea and dyspnea on exertion reported no nausea vomiting reported, patient denied any fever chills    Overview/Hospital Course:  4/14 FM:  Patient known to me, patient presented to the emergency department with an acute 6 lb weight gain severe painful scrotal edema and dyspnea.  Patient is on an IV Lasix drip with some response.  Scrotum is still massive today.  Patient is not taking anticoagulation because of his thrombocytopenia and cirrhosis cardiology has been consulted patient also had a planned surgical removal of his tunneled dialysis catheter in the right chest for this week which may be on hold depending on how he responds to treatment this admission.  I have contacted surgery.  4/15 FM:  Patient is responding to the Lasix drip and had decent urine output, patient's scrotal edema has reduced about 30%.  Patient's electrolytes noted we will begin replacing potassium and magnesium as we diurese patient's midodrine was discontinued and I have explained to him that he will likely have chronic hypotension and once we get to a adequate volume level we will see how he tolerates his blood pressures.  We will likely require a prolonged hospitalization.  4/16 FM:  Patient is diuresing slowly, patient's weight has not changed much on measurement but question accuracy.  Patient's  "scrotal edema is improved some but still persist.  Patient's creatinine is down on IV Lasix drip, we will consult patient's nephrologist to follow along.  Leaving Vas-Cath in place for now.  Patient's getting out of bed and working with therapy which I have continued to encourage.  Midodrine is on hold which I do suspect was contributing to volume retention.  4/17 FM:  Patient's diuresing well and has had a significant improvement in the last 24 hours.  Patient's electrolytes noted continue repletion continue hospitalization and IV Lasix drip for another 24 hours.  We will likely transition to p.o. in a.m..  Mental status seems to be improved keep working with therapy.  4/18 FM:  Patient is continuing to diurese scrotal edema almost resolved patient's still has quite a bit of volume left to attempt remove blood pressure improved creatinine improved.  Continue to press forward with current treatment.    Past Medical History:   Diagnosis Date    Atrial fibrillation     Bulging disc     Cirrhosis     Colon polyp 12/29/2017    Rpt 5 yrs    Encounter for blood transfusion     EV (esophageal varices)     Hypertension 03/30/2023    Renal disorder     Skin cancer 03/2017    'NOSE"    Thrombocytopenia        Past Surgical History:   Procedure Laterality Date    CATHETERIZATION OF BOTH LEFT AND RIGHT HEART N/A 02/08/2023    Procedure: CATHETERIZATION, HEART, BOTH LEFT AND RIGHT;  Surgeon: Flo Malone MD;  Location: [facility] CATH LAB;  Service: Cardiology;  Laterality: N/A;  low bleeding risk 1.0%    CHOLECYSTECTOMY      COLONOSCOPY      COLONOSCOPY N/A 12/29/2017    ta rpt 2022    CORONARY ANGIOGRAPHY N/A 02/08/2023    Procedure: ANGIOGRAM, CORONARY ARTERY;  Surgeon: Flo Malone MD;  Location: [facility] CATH LAB;  Service: Cardiology;  Laterality: N/A;    HERNIA REPAIR      INCISION AND DRAINAGE Right 02/27/2025    Procedure: Incision and Drainage, thigh;  Surgeon: Kayla Foster MD;  Location: [facility] OR;  Service: " General;  Laterality: Right;  Plan to go first if pt has cardiac clearance - SB    SKIN BIOPSY  03/2017    TONSILLECTOMY      UPPER GASTROINTESTINAL ENDOSCOPY  2011    EV    UPPER GASTROINTESTINAL ENDOSCOPY  2016    VASECTOMY         Review of patient's allergies indicates:  No Known Allergies    No current facility-administered medications on file prior to encounter.     Current Outpatient Medications on File Prior to Encounter   Medication Sig    famotidine (PEPCID) 20 MG tablet Take 1 tablet (20 mg total) by mouth once daily.    furosemide (LASIX) 80 MG tablet Take 1 tablet (80 mg total) by mouth 2 (two) times a day for 3 days, THEN 1 tablet (80 mg total) once daily.    lactulose (CHRONULAC) 20 gram/30 mL Soln Take 45 mLs (30 g total) by mouth 3 (three) times daily.    magnesium chloride (MAG 64) 64 mg TbEC Take 2 tablets (128 mg total) by mouth once daily.    metoprolol tartrate (LOPRESSOR) 25 MG tablet TAKE 0.5 TABLETS BY MOUTH 2 TIMES DAILY.    midodrine (PROAMATINE) 5 MG Tab Take 3 tablets (15 mg total) by mouth 3 (three) times daily with meals.    potassium bicarbonate (K-LYTE) disintegrating tablet Take 1 tablet (25 mEq total) by mouth 2 (two) times a day.    spironolactone (ALDACTONE) 25 MG tablet Take 1 tablet (25 mg total) by mouth 2 (two) times daily.    tamsulosin (FLOMAX) 0.4 mg Cap Take 1 capsule (0.4 mg total) by mouth once daily.    thiamine 100 MG tablet Take 100 mg by mouth once daily.    apixaban (ELIQUIS) 5 mg Tab Take 5 mg by mouth 2 (two) times daily.     Family History       Problem Relation (Age of Onset)    Alzheimer's disease Mother    Cancer Maternal Uncle    Heart disease Maternal Uncle    Hyperlipidemia Brother    No Known Problems Father    Ovarian cancer Sister          Tobacco Use    Smoking status: Never     Passive exposure: Never    Smokeless tobacco: Never   Substance and Sexual Activity    Alcohol use: Not Currently     Alcohol/week: 0.0 standard drinks of alcohol    Drug use:  No    Sexual activity: Not Currently     Review of Systems   Constitutional:  Positive for fatigue. Negative for chills and fever.   HENT:  Negative for congestion.    Eyes:  Negative for visual disturbance.   Respiratory:  Positive for shortness of breath. Negative for wheezing.    Cardiovascular:  Positive for leg swelling. Negative for chest pain and palpitations.   Gastrointestinal:  Positive for abdominal distention. Negative for abdominal pain, constipation, diarrhea, nausea and vomiting.   Endocrine: Negative for polyuria.   Genitourinary:  Positive for scrotal swelling (Scrotal edema). Negative for dysuria.   Musculoskeletal:  Positive for arthralgias. Negative for back pain and gait problem.   Skin:  Negative for wound.   Neurological:  Positive for weakness.   Psychiatric/Behavioral:  The patient is not nervous/anxious.      Objective:     Vital Signs (Most Recent):  Temp: 97.8 °F (36.6 °C) (04/18/25 0807)  Pulse: 86 (04/18/25 0901)  Resp: 18 (04/18/25 0807)  BP: (!) 121/58 (04/18/25 0901)  SpO2: 95 % (04/18/25 0807) Vital Signs (24h Range):  Temp:  [97 °F (36.1 °C)-98.4 °F (36.9 °C)] 97.8 °F (36.6 °C)  Pulse:  [] 86  Resp:  [18] 18  SpO2:  [95 %-98 %] 95 %  BP: (106-121)/(53-61) 121/58     Weight: (!) 136.5 kg (301 lb)  Body mass index is 39.71 kg/m².     Physical Exam  Vitals and nursing note reviewed.   Constitutional:       General: He is not in acute distress.     Appearance: He is well-developed. He is obese. He is ill-appearing. He is not diaphoretic.   HENT:      Head: Normocephalic and atraumatic.      Nose: No congestion or rhinorrhea.   Eyes:      General: Scleral icterus present.         Right eye: No discharge.         Left eye: No discharge.      Extraocular Movements: Extraocular movements intact.   Neck:      Thyroid: No thyromegaly.      Vascular: No JVD.   Cardiovascular:      Rate and Rhythm: Normal rate. Rhythm irregular.      Heart sounds: Normal heart sounds. No murmur heard.      No friction rub. No gallop.   Pulmonary:      Effort: No respiratory distress.      Breath sounds: No wheezing, rhonchi or rales.   Abdominal:      General: There is distension.      Tenderness: There is no abdominal tenderness. There is no guarding or rebound.      Hernia: No hernia is present.   Genitourinary:     Comments: Scrotal edema ++ improved  Musculoskeletal:      Cervical back: Neck supple.      Right lower leg: Edema present.      Left lower leg: Edema present.   Neurological:      Mental Status: He is alert and oriented to person, place, and time. Mental status is at baseline.      Motor: Weakness present.   Psychiatric:         Behavior: Behavior normal.         Thought Content: Thought content normal.                Significant Labs: All pertinent labs within the past 24 hours have been reviewed.  Recent Lab Results         04/18/25  0553        Albumin 1.9       ALP 69       ALT 9       Anion Gap 6       AST 28       Baso # 0.04       Basophil % 0.8       BILIRUBIN TOTAL 3.4  Comment: For infants and newborns, interpretation of results should be based   on gestational age, weight and in agreement with clinical   observations.    Premature Infant recommended reference ranges:   0-24 hours:  <8.0 mg/dL   24-48 hours: <12.0 mg/dL   3-5 days:    <15.0 mg/dL   6-29 days:   <15.0 mg/dL       BUN 16       Calcium 7.9       Chloride 97       CO2 31       Creatinine 1.7       eGFR 43  Comment: Estimated GFR calculated using the CKD-EPI creatinine (2021) equation.       Eos # 0.20       Eos % 4.0       Glucose 92       Gran # (ANC) 3.08       Hematocrit 29.2       Hemoglobin 9.7       Immature Grans (Abs) 0.02  Comment: Mild elevation in immature granulocytes is non specific and can be seen in a variety of conditions including stress response, acute inflammation, trauma and pregnancy. Correlation with other laboratory and clinical findings is essential.       Immature Granulocytes 0.4       Lymph # 0.96        Lymph % 19.0       Magnesium  1.5       MCH 31.8       MCHC 33.2       MCV 96       Mono # 0.76       Mono % 15.0       MPV 13.0       Neut % 60.8       nRBC 0       Platelet Count 52       Potassium 3.2       PROTEIN TOTAL 6.0       RBC 3.05       RDW 16.1       Sodium 134       WBC 5.06               Significant Imaging: I have reviewed all pertinent imaging results/findings within the past 24 hours.      Assessment & Plan  Decompensated cirrhosis  Patient with known Cirrhosis with Child's class Calculator for Child's score link- [email or web address]://www.[employer]/[email or web address]/child-ocampo-score-cirrhosis-mortality#creator-insights. Co-morbidities are present and inclusive of esophageal varices, hepatic encephalopathy, and anemia/pancytopenia.  MELD-Na score calculated; MELD 3.0: 27 at 4/15/2025  5:36 AM  MELD-Na: 26 at 4/15/2025  5:36 AM  Calculated from:  Serum Creatinine: 2.2 mg/dL at 4/15/2025  5:36 AM  Serum Sodium: 135 mmol/L at 4/15/2025  5:36 AM  Total Bilirubin: 3.5 mg/dL at 4/15/2025  5:36 AM  Serum Albumin: 2 g/dL at 4/15/2025  5:36 AM  INR(ratio): 1.7 at 4/13/2025 12:42 PM  Age at listing (hypothetical): 69 years  Sex: Male at 4/15/2025  5:36 AM      Continue chronic meds. Etiology likely ETOH. Will avoid any hepatotoxic meds, and monitor CBC/CMP/INR for synthetic function.     4/16 FM:  Slowly improving.  4/18 FM:  Cont to improve.  Cirrhosis  Patient with known Cirrhosis with Child's class Calculator for Child's score link- https://www.ScaleGrid/[email or web address]/child-ocampo-score-cirrhosis-mortality#creator-insights. Co-morbidities are present and inclusive of ascites and anemia/pancytopenia.  MELD-Na score calculated; MELD 3.0: 27 at 4/15/2025  5:36 AM  MELD-Na: 26 at 4/15/2025  5:36 AM  Calculated from:  Serum Creatinine: 2.2 mg/dL at 4/15/2025  5:36 AM  Serum Sodium: 135 mmol/L at 4/15/2025  5:36 AM  Total Bilirubin: 3.5 mg/dL at 4/15/2025  5:36 AM  Serum Albumin: 2 g/dL at 4/15/2025  5:36 AM  INR(ratio): 1.7 at 4/13/2025  12:42 PM  Age at listing (hypothetical): 69 years  Sex: Male at 4/15/2025  5:36 AM      Continue chronic meds. Etiology likely ETOH. Will avoid any hepatotoxic meds, and monitor CBC/CMP/INR for synthetic function.   Patient reported     4/15 FM:  DC above, stopping midrodine.  Longstanding persistent atrial fibrillation  Patient has persistent (7 days or more) atrial fibrillation. Patient is currently in atrial fibrillation. [id number] Score: 1. The patients heart rate in the last 24 hours is as follows:  Pulse  Min: 86  Max: 107     Antiarrhythmics  metoprolol tartrate (LOPRESSOR) tablet 50 mg, 2 times daily, Oral    Anticoagulants       Plan  - Replete lytes with a goal of K>4, Mg >2  - Patient is not anticoagulated due to thrombocytopenia   - Patient's afib is currently uncontrolled. Will adjust treatment as follows: adjust lopressor dose  - cardiology consulted    4/14 FM:  No anticoags 2nd CLD, Plt Count.  4/15 FM:  Cont. Txt plan.  Thrombocytopenia  The likely etiology of thrombocytopenia is liver disease. The patients 3 most recent labs are listed below.  Recent Labs     04/16/25  0532 04/17/25  0646 04/18/25  0553   PLT 73* 45* 52*     Plan  - Will transfuse if platelet count is <20k.    Scrotal edema  4/14 FM:  Not much change overnight, cont IV lasix drip.  4/15 FM:  Improving slowly, echo noted.  4/16 FM:  Slowly improving.  4/17 FM:  Much improved today.  4/18 FM:  Cont to improve.    Anasarca  2/2 to cirrhosis, diuresis on board, replete electrolytes, monitor urine output    4/14 FM:  Stopping midrodine today, possible related to volume retention.  4/15 FM:  Slowly improving, need to establish lowest tolerable dry weight.  4/16 FM:  Slowly improving.  4/17 FM:  Cont to improve.  4/18 FM:  Cont to improve.  CKD (chronic kidney disease) stage 4, GFR 15-29 ml/min  Creatine stable for now. BMP reviewed- noted Estimated Creatinine Clearance: 57.8 mL/min (A) (based on SCr of 1.7 mg/dL (H)). according to  latest data. Based on current GFR, CKD stage is stage 4 - GFR 15-29.  Monitor UOP and serial BMP and adjust therapy as needed. Renally dose meds. Avoid nephrotoxic medications and procedures.    4/14 FM:  Will not DC tunneled cath just yet.  4/15 FM:  Patient is responding to lasix drip.  4/16 FM:  Slowly improving.  4/17 FM:  Improving with diuresis.  4/18 FM:  Cont to improve.  Hepatic encephalopathy  4/14 FM:  Stable.  4/15 FM:  Cont current lactulose, adding xifaxin.  4/18 FM:  Cont to improve.    VTE Risk Mitigation (From admission, onward)           Ordered     IP VTE HIGH RISK PATIENT  Once         04/12/25 2327     Place sequential compression device  Until discontinued         04/12/25 2327     Place ANDREW hose  Until discontinued         04/12/25 2327                    Discharge Planning   AUTUMN:      Code Status: Full Code   Medical Readiness for Discharge Date:   Discharge Plan A: Home with family, Home Health                        Joao Villegas III, MD  Department of Hospital Medicine   [facility]

## 2025-04-18 NOTE — SUBJECTIVE & OBJECTIVE
"Past Medical History:   Diagnosis Date    Atrial fibrillation     Bulging disc     Cirrhosis     Colon polyp 12/29/2017    Rpt 5 yrs    Encounter for blood transfusion     EV (esophageal varices)     Hypertension 03/30/2023    Renal disorder     Skin cancer 03/2017    'NOSE"    Thrombocytopenia        Past Surgical History:   Procedure Laterality Date    CATHETERIZATION OF BOTH LEFT AND RIGHT HEART N/A 02/08/2023    Procedure: CATHETERIZATION, HEART, BOTH LEFT AND RIGHT;  Surgeon: Flo Malone MD;  Location: [facility] CATH LAB;  Service: Cardiology;  Laterality: N/A;  low bleeding risk 1.0%    CHOLECYSTECTOMY      COLONOSCOPY      COLONOSCOPY N/A 12/29/2017    ta rpt 2022    CORONARY ANGIOGRAPHY N/A 02/08/2023    Procedure: ANGIOGRAM, CORONARY ARTERY;  Surgeon: Flo Malone MD;  Location: [facility] CATH LAB;  Service: Cardiology;  Laterality: N/A;    HERNIA REPAIR      INCISION AND DRAINAGE Right 02/27/2025    Procedure: Incision and Drainage, thigh;  Surgeon: Kayla Foster MD;  Location: [facility];  Service: General;  Laterality: Right;  Plan to go first if pt has cardiac clearance - SB    SKIN BIOPSY  03/2017    TONSILLECTOMY      UPPER GASTROINTESTINAL ENDOSCOPY  2011    EV    UPPER GASTROINTESTINAL ENDOSCOPY  2016    VASECTOMY         Review of patient's allergies indicates:  No Known Allergies    No current facility-administered medications on file prior to encounter.     Current Outpatient Medications on File Prior to Encounter   Medication Sig    famotidine (PEPCID) 20 MG tablet Take 1 tablet (20 mg total) by mouth once daily.    furosemide (LASIX) 80 MG tablet Take 1 tablet (80 mg total) by mouth 2 (two) times a day for 3 days, THEN 1 tablet (80 mg total) once daily.    lactulose (CHRONULAC) 20 gram/30 mL Soln Take 45 mLs (30 g total) by mouth 3 (three) times daily.    magnesium chloride (MAG 64) 64 mg TbEC Take 2 tablets (128 mg total) by mouth once daily.    metoprolol tartrate (LOPRESSOR) 25 MG tablet " TAKE 0.5 TABLETS BY MOUTH 2 TIMES DAILY.    midodrine (PROAMATINE) 5 MG Tab Take 3 tablets (15 mg total) by mouth 3 (three) times daily with meals.    potassium bicarbonate (K-LYTE) disintegrating tablet Take 1 tablet (25 mEq total) by mouth 2 (two) times a day.    spironolactone (ALDACTONE) 25 MG tablet Take 1 tablet (25 mg total) by mouth 2 (two) times daily.    tamsulosin (FLOMAX) 0.4 mg Cap Take 1 capsule (0.4 mg total) by mouth once daily.    thiamine 100 MG tablet Take 100 mg by mouth once daily.    apixaban (ELIQUIS) 5 mg Tab Take 5 mg by mouth 2 (two) times daily.     Family History       Problem Relation (Age of Onset)    Alzheimer's disease Mother    Cancer Maternal Uncle    Heart disease Maternal Uncle    Hyperlipidemia Brother    No Known Problems Father    Ovarian cancer Sister          Tobacco Use    Smoking status: Never     Passive exposure: Never    Smokeless tobacco: Never   Substance and Sexual Activity    Alcohol use: Not Currently     Alcohol/week: 0.0 standard drinks of alcohol    Drug use: No    Sexual activity: Not Currently     Review of Systems   Constitutional:  Positive for fatigue. Negative for chills and fever.   HENT:  Negative for congestion.    Eyes:  Negative for visual disturbance.   Respiratory:  Positive for shortness of breath. Negative for wheezing.    Cardiovascular:  Positive for leg swelling. Negative for chest pain and palpitations.   Gastrointestinal:  Positive for abdominal distention. Negative for abdominal pain, constipation, diarrhea, nausea and vomiting.   Endocrine: Negative for polyuria.   Genitourinary:  Positive for scrotal swelling (Scrotal edema). Negative for dysuria.   Musculoskeletal:  Positive for arthralgias. Negative for back pain and gait problem.   Skin:  Negative for wound.   Neurological:  Positive for weakness.   Psychiatric/Behavioral:  The patient is not nervous/anxious.      Objective:     Vital Signs (Most Recent):  Temp: 97.8 °F (36.6 °C)  (04/18/25 0807)  Pulse: 86 (04/18/25 0901)  Resp: 18 (04/18/25 0807)  BP: (!) 121/58 (04/18/25 0901)  SpO2: 95 % (04/18/25 0807) Vital Signs (24h Range):  Temp:  [97 °F (36.1 °C)-98.4 °F (36.9 °C)] 97.8 °F (36.6 °C)  Pulse:  [] 86  Resp:  [18] 18  SpO2:  [95 %-98 %] 95 %  BP: (106-121)/(53-61) 121/58     Weight: (!) 136.5 kg (301 lb)  Body mass index is 39.71 kg/m².     Physical Exam  Vitals and nursing note reviewed.   Constitutional:       General: He is not in acute distress.     Appearance: He is well-developed. He is obese. He is ill-appearing. He is not diaphoretic.   HENT:      Head: Normocephalic and atraumatic.      Nose: No congestion or rhinorrhea.   Eyes:      General: Scleral icterus present.         Right eye: No discharge.         Left eye: No discharge.      Extraocular Movements: Extraocular movements intact.   Neck:      Thyroid: No thyromegaly.      Vascular: No JVD.   Cardiovascular:      Rate and Rhythm: Normal rate. Rhythm irregular.      Heart sounds: Normal heart sounds. No murmur heard.     No friction rub. No gallop.   Pulmonary:      Effort: No respiratory distress.      Breath sounds: No wheezing, rhonchi or rales.   Abdominal:      General: There is distension.      Tenderness: There is no abdominal tenderness. There is no guarding or rebound.      Hernia: No hernia is present.   Genitourinary:     Comments: Scrotal edema ++ improved  Musculoskeletal:      Cervical back: Neck supple.      Right lower leg: Edema present.      Left lower leg: Edema present.   Neurological:      Mental Status: He is alert and oriented to person, place, and time. Mental status is at baseline.      Motor: Weakness present.   Psychiatric:         Behavior: Behavior normal.         Thought Content: Thought content normal.                Significant Labs: All pertinent labs within the past 24 hours have been reviewed.  Recent Lab Results         04/18/25  0553        Albumin 1.9       ALP 69       ALT 9        Anion Gap 6       AST 28       Baso # 0.04       Basophil % 0.8       BILIRUBIN TOTAL 3.4  Comment: For infants and newborns, interpretation of results should be based   on gestational age, weight and in agreement with clinical   observations.    Premature Infant recommended reference ranges:   0-24 hours:  <8.0 mg/dL   24-48 hours: <12.0 mg/dL   3-5 days:    <15.0 mg/dL   6-29 days:   <15.0 mg/dL       BUN 16       Calcium 7.9       Chloride 97       CO2 31       Creatinine 1.7       eGFR 43  Comment: Estimated GFR calculated using the CKD-EPI creatinine (2021) equation.       Eos # 0.20       Eos % 4.0       Glucose 92       Gran # (ANC) 3.08       Hematocrit 29.2       Hemoglobin 9.7       Immature Grans (Abs) 0.02  Comment: Mild elevation in immature granulocytes is non specific and can be seen in a variety of conditions including stress response, acute inflammation, trauma and pregnancy. Correlation with other laboratory and clinical findings is essential.       Immature Granulocytes 0.4       Lymph # 0.96       Lymph % 19.0       Magnesium  1.5       MCH 31.8       MCHC 33.2       MCV 96       Mono # 0.76       Mono % 15.0       MPV 13.0       Neut % 60.8       nRBC 0       Platelet Count 52       Potassium 3.2       PROTEIN TOTAL 6.0       RBC 3.05       RDW 16.1       Sodium 134       WBC 5.06               Significant Imaging: I have reviewed all pertinent imaging results/findings within the past 24 hours.

## 2025-04-18 NOTE — ASSESSMENT & PLAN NOTE
Patient with known Cirrhosis with Child's class Calculator for Child's score link- https://www.[employer].com/[email or web address]/child-ocampo-score-cirrhosis-mortality#creator-insights. Co-morbidities are present and inclusive of esophageal varices, hepatic encephalopathy, and anemia/pancytopenia.  MELD-Na score calculated; MELD 3.0: 27 at 4/15/2025  5:36 AM  MELD-Na: 26 at 4/15/2025  5:36 AM  Calculated from:  Serum Creatinine: 2.2 mg/dL at 4/15/2025  5:36 AM  Serum Sodium: 135 mmol/L at 4/15/2025  5:36 AM  Total Bilirubin: 3.5 mg/dL at 4/15/2025  5:36 AM  Serum Albumin: 2 g/dL at 4/15/2025  5:36 AM  INR(ratio): 1.7 at 4/13/2025 12:42 PM  Age at listing (hypothetical): 69 years  Sex: Male at 4/15/2025  5:36 AM      Continue chronic meds. Etiology likely ETOH. Will avoid any hepatotoxic meds, and monitor CBC/CMP/INR for synthetic function.     4/16 FM:  Slowly improving.  4/18 FM:  Cont to improve.

## 2025-04-18 NOTE — ASSESSMENT & PLAN NOTE
The likely etiology of thrombocytopenia is liver disease. The patients 3 most recent labs are listed below.  Recent Labs     04/16/25  0532 04/17/25  0646 04/18/25  0553   PLT 73* 45* 52*     Plan  - Will transfuse if platelet count is <20k.

## 2025-04-18 NOTE — ASSESSMENT & PLAN NOTE
Patient has persistent (7 days or more) atrial fibrillation. Patient is currently in atrial fibrillation. [id number] Score: 1. The patients heart rate in the last 24 hours is as follows:  Pulse  Min: 86  Max: 107     Antiarrhythmics  metoprolol tartrate (LOPRESSOR) tablet 50 mg, 2 times daily, Oral    Anticoagulants       Plan  - Replete lytes with a goal of K>4, Mg >2  - Patient is not anticoagulated due to thrombocytopenia   - Patient's afib is currently uncontrolled. Will adjust treatment as follows: adjust lopressor dose  - cardiology consulted    4/14 FM:  No anticoags 2nd CLD, Plt Count.  4/15 FM:  Cont. Txt plan.

## 2025-04-19 LAB
ABSOLUTE EOSINOPHIL (OHS): 0.21 K/UL
ABSOLUTE MONOCYTE (OHS): 0.72 K/UL (ref 0.3–1)
ABSOLUTE NEUTROPHIL COUNT (OHS): 2.74 K/UL (ref 1.8–7.7)
ALBUMIN SERPL BCP-MCNC: 1.9 G/DL (ref 3.5–5.2)
ALP SERPL-CCNC: 68 UNIT/L (ref 40–150)
ALT SERPL W/O P-5'-P-CCNC: 9 UNIT/L (ref 10–44)
ANION GAP (OHS): 9 MMOL/L (ref 8–16)
AST SERPL-CCNC: 29 UNIT/L (ref 11–45)
BASOPHILS # BLD AUTO: 0.03 K/UL
BASOPHILS NFR BLD AUTO: 0.7 %
BILIRUB SERPL-MCNC: 3 MG/DL (ref 0.1–1)
BUN SERPL-MCNC: 18 MG/DL (ref 8–23)
CALCIUM SERPL-MCNC: 8 MG/DL (ref 8.7–10.5)
CHLORIDE SERPL-SCNC: 97 MMOL/L (ref 95–110)
CO2 SERPL-SCNC: 28 MMOL/L (ref 23–29)
CREAT SERPL-MCNC: 1.6 MG/DL (ref 0.5–1.4)
ERYTHROCYTE [DISTWIDTH] IN BLOOD BY AUTOMATED COUNT: 15.9 % (ref 11.5–14.5)
GFR SERPLBLD CREATININE-BSD FMLA CKD-EPI: 46 ML/MIN/1.73/M2
GLUCOSE SERPL-MCNC: 112 MG/DL (ref 70–110)
HCT VFR BLD AUTO: 29.9 % (ref 40–54)
HGB BLD-MCNC: 9.8 GM/DL (ref 14–18)
IMM GRANULOCYTES # BLD AUTO: 0.02 K/UL (ref 0–0.04)
IMM GRANULOCYTES NFR BLD AUTO: 0.4 % (ref 0–0.5)
LYMPHOCYTES # BLD AUTO: 0.85 K/UL (ref 1–4.8)
MAGNESIUM SERPL-MCNC: 1.4 MG/DL (ref 1.6–2.6)
MCH RBC QN AUTO: 31.8 PG (ref 27–31)
MCHC RBC AUTO-ENTMCNC: 32.8 G/DL (ref 32–36)
MCV RBC AUTO: 97 FL (ref 82–98)
NUCLEATED RBC (/100WBC) (OHS): 0 /100 WBC
PLATELET # BLD AUTO: 72 K/UL (ref 150–450)
PLATELET BLD QL SMEAR: ABNORMAL
PMV BLD AUTO: 11.6 FL (ref 9.2–12.9)
POTASSIUM SERPL-SCNC: 3.1 MMOL/L (ref 3.5–5.1)
PROT SERPL-MCNC: 6 GM/DL (ref 6–8.4)
RBC # BLD AUTO: 3.08 M/UL (ref 4.6–6.2)
RELATIVE EOSINOPHIL (OHS): 4.6 %
RELATIVE LYMPHOCYTE (OHS): 18.6 % (ref 18–48)
RELATIVE MONOCYTE (OHS): 15.8 % (ref 4–15)
RELATIVE NEUTROPHIL (OHS): 59.9 % (ref 38–73)
SODIUM SERPL-SCNC: 134 MMOL/L (ref 136–145)
WBC # BLD AUTO: 4.57 K/UL (ref 3.9–12.7)

## 2025-04-19 PROCEDURE — 63600175 PHARM REV CODE 636 W HCPCS: Performed by: INTERNAL MEDICINE

## 2025-04-19 PROCEDURE — 25000003 PHARM REV CODE 250: Performed by: EMERGENCY MEDICINE

## 2025-04-19 PROCEDURE — 36415 COLL VENOUS BLD VENIPUNCTURE: CPT | Performed by: INTERNAL MEDICINE

## 2025-04-19 PROCEDURE — 25000003 PHARM REV CODE 250: Performed by: INTERNAL MEDICINE

## 2025-04-19 PROCEDURE — 85025 COMPLETE CBC W/AUTO DIFF WBC: CPT | Performed by: INTERNAL MEDICINE

## 2025-04-19 PROCEDURE — 11000001 HC ACUTE MED/SURG PRIVATE ROOM

## 2025-04-19 PROCEDURE — 80053 COMPREHEN METABOLIC PANEL: CPT | Performed by: INTERNAL MEDICINE

## 2025-04-19 PROCEDURE — 83735 ASSAY OF MAGNESIUM: CPT | Performed by: INTERNAL MEDICINE

## 2025-04-19 RX ORDER — SPIRONOLACTONE 25 MG/1
50 TABLET ORAL 2 TIMES DAILY
Status: DISCONTINUED | OUTPATIENT
Start: 2025-04-19 | End: 2025-04-24 | Stop reason: HOSPADM

## 2025-04-19 RX ORDER — FUROSEMIDE 10 MG/ML
80 INJECTION INTRAMUSCULAR; INTRAVENOUS EVERY 8 HOURS
Status: DISCONTINUED | OUTPATIENT
Start: 2025-04-19 | End: 2025-04-23

## 2025-04-19 RX ADMIN — MAGNESIUM 64 MG (MAGNESIUM CHLORIDE) TABLET,DELAYED RELEASE 64 MG: at 09:04

## 2025-04-19 RX ADMIN — POTASSIUM BICARBONATE 20 MEQ: 391 TABLET, EFFERVESCENT ORAL at 09:04

## 2025-04-19 RX ADMIN — TAMSULOSIN HYDROCHLORIDE 0.4 MG: 0.4 CAPSULE ORAL at 09:04

## 2025-04-19 RX ADMIN — SPIRONOLACTONE 50 MG: 25 TABLET ORAL at 08:04

## 2025-04-19 RX ADMIN — FUROSEMIDE 80 MG: 10 INJECTION, SOLUTION INTRAVENOUS at 11:04

## 2025-04-19 RX ADMIN — LACTULOSE 20 G: 20 SOLUTION ORAL at 09:04

## 2025-04-19 RX ADMIN — FAMOTIDINE 20 MG: 20 TABLET, FILM COATED ORAL at 09:04

## 2025-04-19 RX ADMIN — METOPROLOL TARTRATE 50 MG: 50 TABLET, FILM COATED ORAL at 09:04

## 2025-04-19 RX ADMIN — MAGNESIUM 64 MG (MAGNESIUM CHLORIDE) TABLET,DELAYED RELEASE 64 MG: at 08:04

## 2025-04-19 RX ADMIN — FUROSEMIDE 80 MG: 10 INJECTION, SOLUTION INTRAVENOUS at 04:04

## 2025-04-19 RX ADMIN — RIFAXIMIN 550 MG: 550 TABLET ORAL at 08:04

## 2025-04-19 RX ADMIN — Medication 6 MG: at 08:04

## 2025-04-19 RX ADMIN — SPIRONOLACTONE 50 MG: 25 TABLET ORAL at 09:04

## 2025-04-19 RX ADMIN — RIFAXIMIN 550 MG: 550 TABLET ORAL at 09:04

## 2025-04-19 RX ADMIN — POTASSIUM BICARBONATE 20 MEQ: 391 TABLET, EFFERVESCENT ORAL at 08:04

## 2025-04-19 RX ADMIN — Medication 100 MG: at 09:04

## 2025-04-19 NOTE — ASSESSMENT & PLAN NOTE
Patient has persistent (7 days or more) atrial fibrillation. Patient is currently in atrial fibrillation. [id number] Score: 1. The patients heart rate in the last 24 hours is as follows:  Pulse  Min: 84  Max: 101     Antiarrhythmics  metoprolol tartrate (LOPRESSOR) tablet 50 mg, 2 times daily, Oral    Anticoagulants       Plan  - Replete lytes with a goal of K>4, Mg >2  - Patient is not anticoagulated due to thrombocytopenia   - Patient's afib is currently uncontrolled. Will adjust treatment as follows: adjust lopressor dose  - cardiology consulted    4/14 FM:  No anticoags 2nd CLD, Plt Count.  4/15 FM:  Cont. Txt plan.

## 2025-04-19 NOTE — ASSESSMENT & PLAN NOTE
Patient with known Cirrhosis with Child's class Calculator for Child's score link- https://[email or web address].com/calc/340/child-ocampo-score-cirrhosis-mortality#creator-insights. Co-morbidities are present and inclusive of ascites and anemia/pancytopenia.  MELD-Na score calculated; MELD 3.0: 27 at 4/15/2025  5:36 AM  MELD-Na: 26 at 4/15/2025  5:36 AM  Calculated from:  Serum Creatinine: 2.2 mg/dL at 4/15/2025  5:36 AM  Serum Sodium: 135 mmol/L at 4/15/2025  5:36 AM  Total Bilirubin: 3.5 mg/dL at 4/15/2025  5:36 AM  Serum Albumin: 2 g/dL at 4/15/2025  5:36 AM  INR(ratio): 1.7 at 4/13/2025 12:42 PM  Age at listing (hypothetical): 69 years  Sex: Male at 4/15/2025  5:36 AM      Continue chronic meds. Etiology likely ETOH. Will avoid any hepatotoxic meds, and monitor CBC/CMP/INR for synthetic function.   Patient reported     4/15 FM:  DC above, stopping midrodine.

## 2025-04-19 NOTE — ASSESSMENT & PLAN NOTE
2/2 to cirrhosis, diuresis on board, replete electrolytes, monitor urine output    4/14 FM:  Stopping midrodine today, possible related to volume retention.  4/15 FM:  Slowly improving, need to establish lowest tolerable dry weight.  4/16 FM:  Slowly improving.  4/17 FM:  Cont to improve.  4/18 FM:  Cont to improve.  4/19 FM:  Home soon.

## 2025-04-19 NOTE — ASSESSMENT & PLAN NOTE
Patient with known Cirrhosis with Child's class Calculator for Child's score link- https://[email or web address].com/calc/340/child-ocampo-score-cirrhosis-mortality#creator-insights. Co-morbidities are present and inclusive of esophageal varices, hepatic encephalopathy, and anemia/pancytopenia.  MELD-Na score calculated; MELD 3.0: 27 at 4/15/2025  5:36 AM  MELD-Na: 26 at 4/15/2025  5:36 AM  Calculated from:  Serum Creatinine: 2.2 mg/dL at 4/15/2025  5:36 AM  Serum Sodium: 135 mmol/L at 4/15/2025  5:36 AM  Total Bilirubin: 3.5 mg/dL at 4/15/2025  5:36 AM  Serum Albumin: 2 g/dL at 4/15/2025  5:36 AM  INR(ratio): 1.7 at 4/13/2025 12:42 PM  Age at listing (hypothetical): 69 years  Sex: Male at 4/15/2025  5:36 AM      Continue chronic meds. Etiology likely ETOH. Will avoid any hepatotoxic meds, and monitor CBC/CMP/INR for synthetic function.     4/16 FM:  Slowly improving.  4/18 FM:  Cont to improve.  4/19 FM:  Home soon.

## 2025-04-19 NOTE — ASSESSMENT & PLAN NOTE
4/14 FM:  Not much change overnight, cont IV lasix drip.  4/15 FM:  Improving slowly, echo noted.  4/16 FM:  Slowly improving.  4/17 FM:  Much improved today.  4/18 FM:  Cont to improve.  4/19 FM:  Transitioning to pulse dosing of lasix.

## 2025-04-19 NOTE — ASSESSMENT & PLAN NOTE
Creatine stable for now. BMP reviewed- noted Estimated Creatinine Clearance: 58.2 mL/min (A) (based on SCr of 1.7 mg/dL (H)). according to latest data. Based on current GFR, CKD stage is stage 4 - GFR 15-29.  Monitor UOP and serial BMP and adjust therapy as needed. Renally dose meds. Avoid nephrotoxic medications and procedures.    4/14 FM:  Will not DC tunneled cath just yet.  4/15 FM:  Patient is responding to lasix drip.  4/16 FM:  Slowly improving.  4/17 FM:  Improving with diuresis.  4/18 FM:  Cont to improve.  4/19 FM:  Home soon.

## 2025-04-19 NOTE — PROGRESS NOTES
[facility] Medicine  Progress Note    Patient Name: Juan Carlos Yoo Sr.  MRN: [id number]  Patient Class: IP- Inpatient   Admission Date: 4/12/2025  Length of Stay: 5 days  Attending Physician: Joao Villegas III, MD  Primary Care Provider: Joao Villegas III, MD        Subjective     Principal Problem:Decompensated cirrhosis        HPI:  Patient 69-year-old male history of AFib, cirrhosis, hypertension came to the ED with complaints of increased swelling to testicular region, lower extremity, patient had been seen previously for same issue, patient is on Lasix and Aldactone, increased pain due to swelling, associated orthopnea and dyspnea on exertion reported no nausea vomiting reported, patient denied any fever chills    Overview/Hospital Course:  4/14 FM:  Patient known to me, patient presented to the emergency department with an acute 6 lb weight gain severe painful scrotal edema and dyspnea.  Patient is on an IV Lasix drip with some response.  Scrotum is still massive today.  Patient is not taking anticoagulation because of his thrombocytopenia and cirrhosis cardiology has been consulted patient also had a planned surgical removal of his tunneled dialysis catheter in the right chest for this week which may be on hold depending on how he responds to treatment this admission.  I have contacted surgery.  4/15 FM:  Patient is responding to the Lasix drip and had decent urine output, patient's scrotal edema has reduced about 30%.  Patient's electrolytes noted we will begin replacing potassium and magnesium as we diurese patient's midodrine was discontinued and I have explained to him that he will likely have chronic hypotension and once we get to a adequate volume level we will see how he tolerates his blood pressures.  We will likely require a prolonged hospitalization.  4/16 FM:  Patient is diuresing slowly, patient's weight has not changed much on measurement but question accuracy.  Patient's  "scrotal edema is improved some but still persist.  Patient's creatinine is down on IV Lasix drip, we will consult patient's nephrologist to follow along.  Leaving Vas-Cath in place for now.  Patient's getting out of bed and working with therapy which I have continued to encourage.  Midodrine is on hold which I do suspect was contributing to volume retention.  4/17 FM:  Patient's diuresing well and has had a significant improvement in the last 24 hours.  Patient's electrolytes noted continue repletion continue hospitalization and IV Lasix drip for another 24 hours.  We will likely transition to p.o. in a.m..  Mental status seems to be improved keep working with therapy.  4/18 FM:  Patient is continuing to diurese scrotal edema almost resolved patient's still has quite a bit of volume left to attempt remove blood pressure improved creatinine improved.  Continue to press forward with current treatment.  4/19 FM:  Patient has now been on a Lasix drip for the last 6 days, we will transitioned to pulse dosing and then p.o..  Patient has lost significant edema and fluid weight but still has a ways to go.  Can likely transition to p.o. and continue this at home.    Past Medical History:   Diagnosis Date    Atrial fibrillation     Bulging disc     Cirrhosis     Colon polyp 12/29/2017    Rpt 5 yrs    Encounter for blood transfusion     EV (esophageal varices)     Hypertension 03/30/2023    Renal disorder     Skin cancer 03/2017    'NOSE"    Thrombocytopenia        Past Surgical History:   Procedure Laterality Date    CATHETERIZATION OF BOTH LEFT AND RIGHT HEART N/A 02/08/2023    Procedure: CATHETERIZATION, HEART, BOTH LEFT AND RIGHT;  Surgeon: Flo Malone MD;  [place] [facility];  Service: Cardiology;  Laterality: N/A;  low bleeding risk 1.0%    CHOLECYSTECTOMY      COLONOSCOPY      COLONOSCOPY N/A 12/29/2017    ta rpt 2022    CORONARY ANGIOGRAPHY N/A 02/08/2023    Procedure: ANGIOGRAM, CORONARY ARTERY;  Surgeon: " Flo Malone MD;  Location: [facility] CATH LAB;  Service: Cardiology;  Laterality: N/A;    HERNIA REPAIR      INCISION AND DRAINAGE Right 02/27/2025    Procedure: Incision and Drainage, thigh;  Surgeon: Kayla Foster MD;  Location: [facility] OR;  Service: General;  Laterality: Right;  Plan to go first if pt has cardiac clearance - SB    SKIN BIOPSY  03/2017    TONSILLECTOMY      UPPER GASTROINTESTINAL ENDOSCOPY  2011    EV    UPPER GASTROINTESTINAL ENDOSCOPY  2016    VASECTOMY         Review of patient's allergies indicates:  No Known Allergies    No current facility-administered medications on file prior to encounter.     Current Outpatient Medications on File Prior to Encounter   Medication Sig    famotidine (PEPCID) 20 MG tablet Take 1 tablet (20 mg total) by mouth once daily.    furosemide (LASIX) 80 MG tablet Take 1 tablet (80 mg total) by mouth 2 (two) times a day for 3 days, THEN 1 tablet (80 mg total) once daily.    lactulose (CHRONULAC) 20 gram/30 mL Soln Take 45 mLs (30 g total) by mouth 3 (three) times daily.    magnesium chloride (MAG 64) 64 mg TbEC Take 2 tablets (128 mg total) by mouth once daily.    metoprolol tartrate (LOPRESSOR) 25 MG tablet TAKE 0.5 TABLETS BY MOUTH 2 TIMES DAILY.    midodrine (PROAMATINE) 5 MG Tab Take 3 tablets (15 mg total) by mouth 3 (three) times daily with meals.    potassium bicarbonate (K-LYTE) disintegrating tablet Take 1 tablet (25 mEq total) by mouth 2 (two) times a day.    spironolactone (ALDACTONE) 25 MG tablet Take 1 tablet (25 mg total) by mouth 2 (two) times daily.    tamsulosin (FLOMAX) 0.4 mg Cap Take 1 capsule (0.4 mg total) by mouth once daily.    thiamine 100 MG tablet Take 100 mg by mouth once daily.    apixaban (ELIQUIS) 5 mg Tab Take 5 mg by mouth 2 (two) times daily.     Family History       Problem Relation (Age of Onset)    Alzheimer's disease Mother    Cancer Maternal Uncle    Heart disease Maternal Uncle    Hyperlipidemia Brother    No Known Problems  Father    Ovarian cancer Sister          Tobacco Use    Smoking status: Never     Passive exposure: Never    Smokeless tobacco: Never   Substance and Sexual Activity    Alcohol use: Not Currently     Alcohol/week: 0.0 standard drinks of alcohol    Drug use: No    Sexual activity: Not Currently     Review of Systems   Constitutional:  Positive for fatigue. Negative for chills and fever.   HENT:  Negative for congestion.    Eyes:  Negative for visual disturbance.   Respiratory:  Positive for shortness of breath. Negative for wheezing.    Cardiovascular:  Positive for leg swelling. Negative for chest pain and palpitations.   Gastrointestinal:  Positive for abdominal distention. Negative for abdominal pain, constipation, diarrhea, nausea and vomiting.   Endocrine: Negative for polyuria.   Genitourinary:  Positive for scrotal swelling (Scrotal edema). Negative for dysuria.   Musculoskeletal:  Positive for arthralgias. Negative for back pain and gait problem.   Skin:  Negative for wound.   Neurological:  Positive for weakness.   Psychiatric/Behavioral:  The patient is not nervous/anxious.      Objective:     Vital Signs (Most Recent):  Temp: 97.9 °F (36.6 °C) (04/19/25 0833)  Pulse: 84 (04/19/25 0833)  Resp: 18 (04/19/25 0833)  BP: (!) 116/58 (04/19/25 0833)  SpO2: (!) 93 % (04/19/25 0833) Vital Signs (24h Range):  Temp:  [97.6 °F (36.4 °C)-98.4 °F (36.9 °C)] 97.9 °F (36.6 °C)  Pulse:  [] 84  Resp:  [18-20] 18  SpO2:  [93 %-98 %] 93 %  BP: (102-121)/(51-58) 116/58     Weight: (!) 138.1 kg (304 lb 6.4 oz)  Body mass index is 40.16 kg/m².     Physical Exam  Vitals and nursing note reviewed.   Constitutional:       General: He is not in acute distress.     Appearance: He is well-developed. He is obese. He is ill-appearing. He is not diaphoretic.   HENT:      Head: Normocephalic and atraumatic.      Nose: No congestion or rhinorrhea.   Eyes:      General: Scleral icterus present.         Right eye: No discharge.          Left eye: No discharge.      Extraocular Movements: Extraocular movements intact.   Neck:      Thyroid: No thyromegaly.      Vascular: No JVD.   Cardiovascular:      Rate and Rhythm: Normal rate. Rhythm irregular.      Heart sounds: Normal heart sounds. No murmur heard.     No friction rub. No gallop.   Pulmonary:      Effort: No respiratory distress.      Breath sounds: No wheezing, rhonchi or rales.   Abdominal:      General: There is distension.      Tenderness: There is no abdominal tenderness. There is no guarding or rebound.      Hernia: No hernia is present.   Genitourinary:     Comments: Scrotal edema ++ improved  Musculoskeletal:      Cervical back: Neck supple.      Right lower leg: Edema present.      Left lower leg: Edema present.   Neurological:      Mental Status: He is alert and oriented to person, place, and time. Mental status is at baseline.      Motor: Weakness present.   Psychiatric:         Behavior: Behavior normal.         Thought Content: Thought content normal.                Significant Labs: All pertinent labs within the past 24 hours have been reviewed.  Recent Lab Results         04/19/25  0608        Baso # 0.03       Basophil % 0.7       Eos # 0.21       Eos % 4.6       Gran # (ANC) 2.74       Hematocrit 29.9       Hemoglobin 9.8       Immature Grans (Abs) 0.02  Comment: Mild elevation in immature granulocytes is non specific and can be seen in a variety of conditions including stress response, acute inflammation, trauma and pregnancy. Correlation with other laboratory and clinical findings is essential.       Immature Granulocytes 0.4       Lymph # 0.85       Lymph % 18.6       Magnesium  1.4       MCH 31.8       MCHC 32.8       MCV 97       Mono # 0.72       Mono % 15.8       MPV 11.6       Neut % 59.9       nRBC 0       Platelet Estimate Decreased       Platelet Count 72       RBC 3.08       RDW 15.9       WBC 4.57               Significant Imaging: I have reviewed all pertinent  imaging results/findings within the past 24 hours.      Assessment & Plan  Decompensated cirrhosis  Patient with known Cirrhosis with Child's class Calculator for Child's score link- https://www.[employer]/calc/340/child-ocampo-score-cirrhosis-mortality#creator-insights. Co-morbidities are present and inclusive of esophageal varices, hepatic encephalopathy, and anemia/pancytopenia.  MELD-Na score calculated; MELD 3.0: 27 at 4/15/2025  5:36 AM  MELD-Na: 26 at 4/15/2025  5:36 AM  Calculated from:  Serum Creatinine: 2.2 mg/dL at 4/15/2025  5:36 AM  Serum Sodium: 135 mmol/L at 4/15/2025  5:36 AM  Total Bilirubin: 3.5 mg/dL at 4/15/2025  5:36 AM  Serum Albumin: 2 g/dL at 4/15/2025  5:36 AM  INR(ratio): 1.7 at 4/13/2025 12:42 PM  Age at listing (hypothetical): 69 years  Sex: Male at 4/15/2025  5:36 AM      Continue chronic meds. Etiology likely ETOH. Will avoid any hepatotoxic meds, and monitor CBC/CMP/INR for synthetic function.     4/16 FM:  Slowly improving.  4/18 FM:  Cont to improve.  4/19 FM:  Home soon.  Cirrhosis  Patient with known Cirrhosis with Child's class Calculator for Child's score link- https://www.[employer]/calc/340/child-ocampo-score-cirrhosis-mortality#creator-insights. Co-morbidities are present and inclusive of ascites and anemia/pancytopenia.  MELD-Na score calculated; MELD 3.0: 27 at 4/15/2025  5:36 AM  MELD-Na: 26 at 4/15/2025  5:36 AM  Calculated from:  Serum Creatinine: 2.2 mg/dL at 4/15/2025  5:36 AM  Serum Sodium: 135 mmol/L at 4/15/2025  5:36 AM  Total Bilirubin: 3.5 mg/dL at 4/15/2025  5:36 AM  Serum Albumin: 2 g/dL at 4/15/2025  5:36 AM  INR(ratio): 1.7 at 4/13/2025 12:42 PM  Age at listing (hypothetical): 69 years  Sex: Male at 4/15/2025  5:36 AM      Continue chronic meds. Etiology likely ETOH. Will avoid any hepatotoxic meds, and monitor CBC/CMP/INR for synthetic function.   Patient reported     4/15 FM:  DC above, stopping midrodine.  Longstanding persistent atrial fibrillation  Patient  has persistent (7 days or more) atrial fibrillation. Patient is currently in atrial fibrillation. [id number] Score: 1. The patients heart rate in the last 24 hours is as follows:  Pulse  Min: 84  Max: 101     Antiarrhythmics  metoprolol tartrate (LOPRESSOR) tablet 50 mg, 2 times daily, Oral    Anticoagulants       Plan  - Replete lytes with a goal of K>4, Mg >2  - Patient is not anticoagulated due to thrombocytopenia   - Patient's afib is currently uncontrolled. Will adjust treatment as follows: adjust lopressor dose  - cardiology consulted    4/14 FM:  No anticoags 2nd CLD, Plt Count.  4/15 FM:  Cont. Txt plan.  Thrombocytopenia  The likely etiology of thrombocytopenia is liver disease. The patients 3 most recent labs are listed below.  Recent Labs     04/17/25  0646 04/18/25  0553 04/19/25  0608   PLT 45* 52* 72*     Plan  - Will transfuse if platelet count is <20k.    Scrotal edema  4/14 FM:  Not much change overnight, cont IV lasix drip.  4/15 FM:  Improving slowly, echo noted.  4/16 FM:  Slowly improving.  4/17 FM:  Much improved today.  4/18 FM:  Cont to improve.  4/19 FM:  Transitioning to pulse dosing of lasix.    Anasarca  2/2 to cirrhosis, diuresis on board, replete electrolytes, monitor urine output    4/14 FM:  Stopping midrodine today, possible related to volume retention.  4/15 FM:  Slowly improving, need to establish lowest tolerable dry weight.  4/16 FM:  Slowly improving.  4/17 FM:  Cont to improve.  4/18 FM:  Cont to improve.  4/19 FM:  Home soon.  CKD (chronic kidney disease) stage 4, GFR 15-29 ml/min  Creatine stable for now. BMP reviewed- noted Estimated Creatinine Clearance: 58.2 mL/min (A) (based on SCr of 1.7 mg/dL (H)). according to latest data. Based on current GFR, CKD stage is stage 4 - GFR 15-29.  Monitor UOP and serial BMP and adjust therapy as needed. Renally dose meds. Avoid nephrotoxic medications and procedures.    4/14 FM:  Will not DC tunneled cath just yet.  4/15 FM:  Patient is  responding to lasix drip.  4/16 FM:  Slowly improving.  4/17 FM:  Improving with diuresis.  4/18 FM:  Cont to improve.  4/19 FM:  Home soon.  Hepatic encephalopathy  4/14 FM:  Stable.  4/15 FM:  Cont current lactulose, adding xifaxin.  4/18 FM:  Cont to improve.    VTE Risk Mitigation (From admission, onward)           Ordered     IP VTE HIGH RISK PATIENT  Once         04/12/25 2327     Place sequential compression device  Until discontinued         04/12/25 2327     Place ANDREW hose  Until discontinued         04/12/25 2327                    Discharge Planning   AUTUMN:      Code Status: Full Code   Medical Readiness for Discharge Date:   Discharge Plan A: Home with family, Home Health                        Joao Villegas III, MD  Department of Hospital Medicine   [facility]

## 2025-04-19 NOTE — SUBJECTIVE & OBJECTIVE
"Past Medical History:   Diagnosis Date    Atrial fibrillation     Bulging disc     Cirrhosis     Colon polyp 12/29/2017    Rpt 5 yrs    Encounter for blood transfusion     EV (esophageal varices)     Hypertension 03/30/2023    Renal disorder     Skin cancer 03/2017    'NOSE"    Thrombocytopenia        Past Surgical History:   Procedure Laterality Date    CATHETERIZATION OF BOTH LEFT AND RIGHT HEART N/A 02/08/2023    Procedure: CATHETERIZATION, HEART, BOTH LEFT AND RIGHT;  Surgeon: Flo Malone MD;  Location: [facility] CATH LAB;  Service: Cardiology;  Laterality: N/A;  low bleeding risk 1.0%    CHOLECYSTECTOMY      COLONOSCOPY      COLONOSCOPY N/A 12/29/2017    ta rpt 2022    CORONARY ANGIOGRAPHY N/A 02/08/2023    Procedure: ANGIOGRAM, CORONARY ARTERY;  Surgeon: Flo Malone MD;  Location: [facility] CATH LAB;  Service: Cardiology;  Laterality: N/A;    HERNIA REPAIR      INCISION AND DRAINAGE Right 02/27/2025    Procedure: Incision and Drainage, thigh;  Surgeon: Kayla Foster MD;  Location: [facility];  Service: General;  Laterality: Right;  Plan to go first if pt has cardiac clearance - SB    SKIN BIOPSY  03/2017    TONSILLECTOMY      UPPER GASTROINTESTINAL ENDOSCOPY  2011    EV    UPPER GASTROINTESTINAL ENDOSCOPY  2016    VASECTOMY         Review of patient's allergies indicates:  No Known Allergies    No current facility-administered medications on file prior to encounter.     Current Outpatient Medications on File Prior to Encounter   Medication Sig    famotidine (PEPCID) 20 MG tablet Take 1 tablet (20 mg total) by mouth once daily.    furosemide (LASIX) 80 MG tablet Take 1 tablet (80 mg total) by mouth 2 (two) times a day for 3 days, THEN 1 tablet (80 mg total) once daily.    lactulose (CHRONULAC) 20 gram/30 mL Soln Take 45 mLs (30 g total) by mouth 3 (three) times daily.    magnesium chloride (MAG 64) 64 mg TbEC Take 2 tablets (128 mg total) by mouth once daily.    metoprolol tartrate (LOPRESSOR) 25 MG tablet " TAKE 0.5 TABLETS BY MOUTH 2 TIMES DAILY.    midodrine (PROAMATINE) 5 MG Tab Take 3 tablets (15 mg total) by mouth 3 (three) times daily with meals.    potassium bicarbonate (K-LYTE) disintegrating tablet Take 1 tablet (25 mEq total) by mouth 2 (two) times a day.    spironolactone (ALDACTONE) 25 MG tablet Take 1 tablet (25 mg total) by mouth 2 (two) times daily.    tamsulosin (FLOMAX) 0.4 mg Cap Take 1 capsule (0.4 mg total) by mouth once daily.    thiamine 100 MG tablet Take 100 mg by mouth once daily.    apixaban (ELIQUIS) 5 mg Tab Take 5 mg by mouth 2 (two) times daily.     Family History       Problem Relation (Age of Onset)    Alzheimer's disease Mother    Cancer Maternal Uncle    Heart disease Maternal Uncle    Hyperlipidemia Brother    No Known Problems Father    Ovarian cancer Sister          Tobacco Use    Smoking status: Never     Passive exposure: Never    Smokeless tobacco: Never   Substance and Sexual Activity    Alcohol use: Not Currently     Alcohol/week: 0.0 standard drinks of alcohol    Drug use: No    Sexual activity: Not Currently     Review of Systems   Constitutional:  Positive for fatigue. Negative for chills and fever.   HENT:  Negative for congestion.    Eyes:  Negative for visual disturbance.   Respiratory:  Positive for shortness of breath. Negative for wheezing.    Cardiovascular:  Positive for leg swelling. Negative for chest pain and palpitations.   Gastrointestinal:  Positive for abdominal distention. Negative for abdominal pain, constipation, diarrhea, nausea and vomiting.   Endocrine: Negative for polyuria.   Genitourinary:  Positive for scrotal swelling (Scrotal edema). Negative for dysuria.   Musculoskeletal:  Positive for arthralgias. Negative for back pain and gait problem.   Skin:  Negative for wound.   Neurological:  Positive for weakness.   Psychiatric/Behavioral:  The patient is not nervous/anxious.      Objective:     Vital Signs (Most Recent):  Temp: 97.9 °F (36.6 °C)  (04/19/25 0833)  Pulse: 84 (04/19/25 0833)  Resp: 18 (04/19/25 0833)  BP: (!) 116/58 (04/19/25 0833)  SpO2: (!) 93 % (04/19/25 0833) Vital Signs (24h Range):  Temp:  [97.6 °F (36.4 °C)-98.4 °F (36.9 °C)] 97.9 °F (36.6 °C)  Pulse:  [] 84  Resp:  [18-20] 18  SpO2:  [93 %-98 %] 93 %  BP: (102-121)/(51-58) 116/58     Weight: (!) 138.1 kg (304 lb 6.4 oz)  Body mass index is 40.16 kg/m².     Physical Exam  Vitals and nursing note reviewed.   Constitutional:       General: He is not in acute distress.     Appearance: He is well-developed. He is obese. He is ill-appearing. He is not diaphoretic.   HENT:      Head: Normocephalic and atraumatic.      Nose: No congestion or rhinorrhea.   Eyes:      General: Scleral icterus present.         Right eye: No discharge.         Left eye: No discharge.      Extraocular Movements: Extraocular movements intact.   Neck:      Thyroid: No thyromegaly.      Vascular: No JVD.   Cardiovascular:      Rate and Rhythm: Normal rate. Rhythm irregular.      Heart sounds: Normal heart sounds. No murmur heard.     No friction rub. No gallop.   Pulmonary:      Effort: No respiratory distress.      Breath sounds: No wheezing, rhonchi or rales.   Abdominal:      General: There is distension.      Tenderness: There is no abdominal tenderness. There is no guarding or rebound.      Hernia: No hernia is present.   Genitourinary:     Comments: Scrotal edema ++ improved  Musculoskeletal:      Cervical back: Neck supple.      Right lower leg: Edema present.      Left lower leg: Edema present.   Neurological:      Mental Status: He is alert and oriented to person, place, and time. Mental status is at baseline.      Motor: Weakness present.   Psychiatric:         Behavior: Behavior normal.         Thought Content: Thought content normal.                Significant Labs: All pertinent labs within the past 24 hours have been reviewed.  Recent Lab Results         04/19/25  0608        Baso # 0.03        Basophil % 0.7       Eos # 0.21       Eos % 4.6       Gran # (ANC) 2.74       Hematocrit 29.9       Hemoglobin 9.8       Immature Grans (Abs) 0.02  Comment: Mild elevation in immature granulocytes is non specific and can be seen in a variety of conditions including stress response, acute inflammation, trauma and pregnancy. Correlation with other laboratory and clinical findings is essential.       Immature Granulocytes 0.4       Lymph # 0.85       Lymph % 18.6       Magnesium  1.4       MCH 31.8       MCHC 32.8       MCV 97       Mono # 0.72       Mono % 15.8       MPV 11.6       Neut % 59.9       nRBC 0       Platelet Estimate Decreased       Platelet Count 72       RBC 3.08       RDW 15.9       WBC 4.57               Significant Imaging: I have reviewed all pertinent imaging results/findings within the past 24 hours.

## 2025-04-19 NOTE — ASSESSMENT & PLAN NOTE
The likely etiology of thrombocytopenia is liver disease. The patients 3 most recent labs are listed below.  Recent Labs     04/17/25  0646 04/18/25  0553 04/19/25  0608   PLT 45* 52* 72*     Plan  - Will transfuse if platelet count is <20k.

## 2025-04-20 LAB
ABSOLUTE EOSINOPHIL (OHS): 0.2 K/UL
ABSOLUTE MONOCYTE (OHS): 0.78 K/UL (ref 0.3–1)
ABSOLUTE NEUTROPHIL COUNT (OHS): 2.73 K/UL (ref 1.8–7.7)
ALBUMIN SERPL BCP-MCNC: 1.9 G/DL (ref 3.5–5.2)
ALP SERPL-CCNC: 69 UNIT/L (ref 40–150)
ALT SERPL W/O P-5'-P-CCNC: 9 UNIT/L (ref 10–44)
ANION GAP (OHS): 7 MMOL/L (ref 8–16)
AST SERPL-CCNC: 28 UNIT/L (ref 11–45)
BASOPHILS # BLD AUTO: 0.04 K/UL
BASOPHILS NFR BLD AUTO: 0.9 %
BILIRUB SERPL-MCNC: 3.2 MG/DL (ref 0.1–1)
BUN SERPL-MCNC: 18 MG/DL (ref 8–23)
CALCIUM SERPL-MCNC: 8.1 MG/DL (ref 8.7–10.5)
CHLORIDE SERPL-SCNC: 96 MMOL/L (ref 95–110)
CO2 SERPL-SCNC: 31 MMOL/L (ref 23–29)
CREAT SERPL-MCNC: 1.7 MG/DL (ref 0.5–1.4)
ERYTHROCYTE [DISTWIDTH] IN BLOOD BY AUTOMATED COUNT: 15.9 % (ref 11.5–14.5)
GFR SERPLBLD CREATININE-BSD FMLA CKD-EPI: 43 ML/MIN/1.73/M2
GLUCOSE SERPL-MCNC: 95 MG/DL (ref 70–110)
HCT VFR BLD AUTO: 29.2 % (ref 40–54)
HGB BLD-MCNC: 9.6 GM/DL (ref 14–18)
IMM GRANULOCYTES # BLD AUTO: 0.03 K/UL (ref 0–0.04)
IMM GRANULOCYTES NFR BLD AUTO: 0.6 % (ref 0–0.5)
LYMPHOCYTES # BLD AUTO: 0.91 K/UL (ref 1–4.8)
MAGNESIUM SERPL-MCNC: 1.5 MG/DL (ref 1.6–2.6)
MCH RBC QN AUTO: 31.5 PG (ref 27–31)
MCHC RBC AUTO-ENTMCNC: 32.9 G/DL (ref 32–36)
MCV RBC AUTO: 96 FL (ref 82–98)
NUCLEATED RBC (/100WBC) (OHS): 0 /100 WBC
PLATELET # BLD AUTO: 70 K/UL (ref 150–450)
PMV BLD AUTO: 12.1 FL (ref 9.2–12.9)
POCT GLUCOSE: 124 MG/DL (ref 70–110)
POTASSIUM SERPL-SCNC: 3.3 MMOL/L (ref 3.5–5.1)
PROT SERPL-MCNC: 6.2 GM/DL (ref 6–8.4)
RBC # BLD AUTO: 3.05 M/UL (ref 4.6–6.2)
RELATIVE EOSINOPHIL (OHS): 4.3 %
RELATIVE LYMPHOCYTE (OHS): 19.4 % (ref 18–48)
RELATIVE MONOCYTE (OHS): 16.6 % (ref 4–15)
RELATIVE NEUTROPHIL (OHS): 58.2 % (ref 38–73)
SODIUM SERPL-SCNC: 134 MMOL/L (ref 136–145)
WBC # BLD AUTO: 4.69 K/UL (ref 3.9–12.7)

## 2025-04-20 PROCEDURE — 25000003 PHARM REV CODE 250: Performed by: INTERNAL MEDICINE

## 2025-04-20 PROCEDURE — 11000001 HC ACUTE MED/SURG PRIVATE ROOM

## 2025-04-20 PROCEDURE — 36415 COLL VENOUS BLD VENIPUNCTURE: CPT | Performed by: INTERNAL MEDICINE

## 2025-04-20 PROCEDURE — 85025 COMPLETE CBC W/AUTO DIFF WBC: CPT | Performed by: INTERNAL MEDICINE

## 2025-04-20 PROCEDURE — 63600175 PHARM REV CODE 636 W HCPCS: Performed by: INTERNAL MEDICINE

## 2025-04-20 PROCEDURE — 83735 ASSAY OF MAGNESIUM: CPT | Performed by: INTERNAL MEDICINE

## 2025-04-20 PROCEDURE — 80053 COMPREHEN METABOLIC PANEL: CPT | Performed by: INTERNAL MEDICINE

## 2025-04-20 PROCEDURE — 25000003 PHARM REV CODE 250: Performed by: EMERGENCY MEDICINE

## 2025-04-20 RX ORDER — METOLAZONE 5 MG/1
10 TABLET ORAL DAILY
Status: DISCONTINUED | OUTPATIENT
Start: 2025-04-20 | End: 2025-04-24

## 2025-04-20 RX ADMIN — MAGNESIUM 64 MG (MAGNESIUM CHLORIDE) TABLET,DELAYED RELEASE 64 MG: at 08:04

## 2025-04-20 RX ADMIN — RIFAXIMIN 550 MG: 550 TABLET ORAL at 10:04

## 2025-04-20 RX ADMIN — TAMSULOSIN HYDROCHLORIDE 0.4 MG: 0.4 CAPSULE ORAL at 08:04

## 2025-04-20 RX ADMIN — LACTULOSE 20 G: 20 SOLUTION ORAL at 08:04

## 2025-04-20 RX ADMIN — METOLAZONE 10 MG: 5 TABLET ORAL at 11:04

## 2025-04-20 RX ADMIN — Medication 6 MG: at 10:04

## 2025-04-20 RX ADMIN — FAMOTIDINE 20 MG: 20 TABLET, FILM COATED ORAL at 08:04

## 2025-04-20 RX ADMIN — MAGNESIUM 64 MG (MAGNESIUM CHLORIDE) TABLET,DELAYED RELEASE 64 MG: at 10:04

## 2025-04-20 RX ADMIN — RIFAXIMIN 550 MG: 550 TABLET ORAL at 08:04

## 2025-04-20 RX ADMIN — SPIRONOLACTONE 50 MG: 25 TABLET ORAL at 10:04

## 2025-04-20 RX ADMIN — FUROSEMIDE 80 MG: 10 INJECTION, SOLUTION INTRAVENOUS at 09:04

## 2025-04-20 RX ADMIN — POTASSIUM BICARBONATE 20 MEQ: 391 TABLET, EFFERVESCENT ORAL at 10:04

## 2025-04-20 RX ADMIN — METOPROLOL TARTRATE 50 MG: 50 TABLET, FILM COATED ORAL at 08:04

## 2025-04-20 RX ADMIN — Medication 100 MG: at 08:04

## 2025-04-20 RX ADMIN — METOPROLOL TARTRATE 50 MG: 50 TABLET, FILM COATED ORAL at 10:04

## 2025-04-20 RX ADMIN — POTASSIUM BICARBONATE 20 MEQ: 391 TABLET, EFFERVESCENT ORAL at 08:04

## 2025-04-20 RX ADMIN — SPIRONOLACTONE 50 MG: 25 TABLET ORAL at 08:04

## 2025-04-20 RX ADMIN — FUROSEMIDE 80 MG: 10 INJECTION, SOLUTION INTRAVENOUS at 04:04

## 2025-04-20 NOTE — ASSESSMENT & PLAN NOTE
Patient has persistent (7 days or more) atrial fibrillation. Patient is currently in atrial fibrillation. [id number] Score: 1. The patients heart rate in the last 24 hours is as follows:  Pulse  Min: 72  Max: 88     Antiarrhythmics  metoprolol tartrate (LOPRESSOR) tablet 50 mg, 2 times daily, Oral    Anticoagulants       Plan  - Replete lytes with a goal of K>4, Mg >2  - Patient is not anticoagulated due to thrombocytopenia   - Patient's afib is currently uncontrolled. Will adjust treatment as follows: adjust lopressor dose  - cardiology consulted    4/14 FM:  No anticoags 2nd CLD, Plt Count.  4/15 FM:  Cont. Txt plan.

## 2025-04-20 NOTE — PROGRESS NOTES
[facility] Medicine  Progress Note    Patient Name: Juan Carlos Yoo Sr.  MRN: [id number]  Patient Class: IP- Inpatient   Admission Date: 4/12/2025  Length of Stay: 6 days  Attending Physician: Joao Villegas III, MD  Primary Care Provider: Joao Villegas III, MD        Subjective     Principal Problem:Decompensated cirrhosis        HPI:  Patient 69-year-old male history of AFib, cirrhosis, hypertension came to the ED with complaints of increased swelling to testicular region, lower extremity, patient had been seen previously for same issue, patient is on Lasix and Aldactone, increased pain due to swelling, associated orthopnea and dyspnea on exertion reported no nausea vomiting reported, patient denied any fever chills    Overview/Hospital Course:  4/14 FM:  Patient known to me, patient presented to the emergency department with an acute 6 lb weight gain severe painful scrotal edema and dyspnea.  Patient is on an IV Lasix drip with some response.  Scrotum is still massive today.  Patient is not taking anticoagulation because of his thrombocytopenia and cirrhosis cardiology has been consulted patient also had a planned surgical removal of his tunneled dialysis catheter in the right chest for this week which may be on hold depending on how he responds to treatment this admission.  I have contacted surgery.  4/15 FM:  Patient is responding to the Lasix drip and had decent urine output, patient's scrotal edema has reduced about 30%.  Patient's electrolytes noted we will begin replacing potassium and magnesium as we diurese patient's midodrine was discontinued and I have explained to him that he will likely have chronic hypotension and once we get to a adequate volume level we will see how he tolerates his blood pressures.  We will likely require a prolonged hospitalization.  4/16 FM:  Patient is diuresing slowly, patient's weight has not changed much on measurement but question accuracy.  Patient's  "scrotal edema is improved some but still persist.  Patient's creatinine is down on IV Lasix drip, we will consult patient's nephrologist to follow along.  Leaving Vas-Cath in place for now.  Patient's getting out of bed and working with therapy which I have continued to encourage.  Midodrine is on hold which I do suspect was contributing to volume retention.  4/17 FM:  Patient's diuresing well and has had a significant improvement in the last 24 hours.  Patient's electrolytes noted continue repletion continue hospitalization and IV Lasix drip for another 24 hours.  We will likely transition to p.o. in a.m..  Mental status seems to be improved keep working with therapy.  4/18 FM:  Patient is continuing to diurese scrotal edema almost resolved patient's still has quite a bit of volume left to attempt remove blood pressure improved creatinine improved.  Continue to press forward with current treatment.  4/19 FM:  Patient has now been on a Lasix drip for the last 6 days, we will transitioned to pulse dosing and then p.o..  Patient has lost significant edema and fluid weight but still has a ways to go.  Can likely transition to p.o. and continue this at home.  4/20 FM:  Patient's diuresis has halted and he actually gained a 1 lb overnight.  I have reached out to the patient's nephrologist and she is adding metolazone daily and will ultrafiltrate if no progress tomorrow.    Past Medical History:   Diagnosis Date    Atrial fibrillation     Bulging disc     Cirrhosis     Colon polyp 12/29/2017    Rpt 5 yrs    Encounter for blood transfusion     EV (esophageal varices)     Hypertension 03/30/2023    Renal disorder     Skin cancer 03/2017    'NOSE"    Thrombocytopenia        Past Surgical History:   Procedure Laterality Date    CATHETERIZATION OF BOTH LEFT AND RIGHT HEART N/A 02/08/2023    Procedure: CATHETERIZATION, HEART, BOTH LEFT AND RIGHT;  Surgeon: Flo Malone MD;  Location: [facility] CATH LAB;  Service: Cardiology;  " Laterality: N/A;  low bleeding risk 1.0%    CHOLECYSTECTOMY      COLONOSCOPY      COLONOSCOPY N/A 12/29/2017    ta rpt 2022    CORONARY ANGIOGRAPHY N/A 02/08/2023    Procedure: ANGIOGRAM, CORONARY ARTERY;  Surgeon: Flo Malone MD;  Location: [facility] CATH LAB;  Service: Cardiology;  Laterality: N/A;    HERNIA REPAIR      INCISION AND DRAINAGE Right 02/27/2025    Procedure: Incision and Drainage, thigh;  Surgeon: Kayla Foster MD;  Location: [facility] OR;  Service: General;  Laterality: Right;  Plan to go first if pt has cardiac clearance - SB    SKIN BIOPSY  03/2017    TONSILLECTOMY      UPPER GASTROINTESTINAL ENDOSCOPY  2011    EV    UPPER GASTROINTESTINAL ENDOSCOPY  2016    VASECTOMY         Review of patient's allergies indicates:  No Known Allergies    No current facility-administered medications on file prior to encounter.     Current Outpatient Medications on File Prior to Encounter   Medication Sig    famotidine (PEPCID) 20 MG tablet Take 1 tablet (20 mg total) by mouth once daily.    furosemide (LASIX) 80 MG tablet Take 1 tablet (80 mg total) by mouth 2 (two) times a day for 3 days, THEN 1 tablet (80 mg total) once daily.    lactulose (CHRONULAC) 20 gram/30 mL Soln Take 45 mLs (30 g total) by mouth 3 (three) times daily.    magnesium chloride (MAG 64) 64 mg TbEC Take 2 tablets (128 mg total) by mouth once daily.    metoprolol tartrate (LOPRESSOR) 25 MG tablet TAKE 0.5 TABLETS BY MOUTH 2 TIMES DAILY.    midodrine (PROAMATINE) 5 MG Tab Take 3 tablets (15 mg total) by mouth 3 (three) times daily with meals.    potassium bicarbonate (K-LYTE) disintegrating tablet Take 1 tablet (25 mEq total) by mouth 2 (two) times a day.    spironolactone (ALDACTONE) 25 MG tablet Take 1 tablet (25 mg total) by mouth 2 (two) times daily.    tamsulosin (FLOMAX) 0.4 mg Cap Take 1 capsule (0.4 mg total) by mouth once daily.    thiamine 100 MG tablet Take 100 mg by mouth once daily.    apixaban (ELIQUIS) 5 mg Tab Take 5 mg by mouth  2 (two) times daily.     Family History       Problem Relation (Age of Onset)    Alzheimer's disease Mother    Cancer Maternal Uncle    Heart disease Maternal Uncle    Hyperlipidemia Brother    No Known Problems Father    Ovarian cancer Sister          Tobacco Use    Smoking status: Never     Passive exposure: Never    Smokeless tobacco: Never   Substance and Sexual Activity    Alcohol use: Not Currently     Alcohol/week: 0.0 standard drinks of alcohol    Drug use: No    Sexual activity: Not Currently     Review of Systems   Constitutional:  Positive for fatigue. Negative for chills and fever.   HENT:  Negative for congestion.    Eyes:  Negative for visual disturbance.   Respiratory:  Positive for shortness of breath. Negative for wheezing.    Cardiovascular:  Positive for leg swelling. Negative for chest pain and palpitations.   Gastrointestinal:  Positive for abdominal distention. Negative for abdominal pain, constipation, diarrhea, nausea and vomiting.   Endocrine: Negative for polyuria.   Genitourinary:  Positive for scrotal swelling (Scrotal edema). Negative for dysuria.   Musculoskeletal:  Positive for arthralgias. Negative for back pain and gait problem.   Skin:  Negative for wound.   Neurological:  Positive for weakness.   Psychiatric/Behavioral:  The patient is not nervous/anxious.      Objective:     Vital Signs (Most Recent):  Temp: 97.8 °F (36.6 °C) (04/20/25 0729)  Pulse: 76 (04/20/25 0836)  Resp: 20 (04/20/25 0729)  BP: (!) 114/58 (04/20/25 0836)  SpO2: 98 % (04/20/25 0729) Vital Signs (24h Range):  Temp:  [97.8 °F (36.6 °C)-98.1 °F (36.7 °C)] 97.8 °F (36.6 °C)  Pulse:  [72-88] 76  Resp:  [16-20] 20  SpO2:  [94 %-98 %] 98 %  BP: (104-114)/(52-59) 114/58     Weight: (!) 139.5 kg (307 lb 8 oz)  Body mass index is 40.57 kg/m².     Physical Exam  Vitals and nursing note reviewed.   Constitutional:       General: He is not in acute distress.     Appearance: He is well-developed. He is obese. He is  ill-appearing. He is not diaphoretic.   HENT:      Head: Normocephalic and atraumatic.      Nose: No congestion or rhinorrhea.   Eyes:      General: Scleral icterus present.         Right eye: No discharge.         Left eye: No discharge.      Extraocular Movements: Extraocular movements intact.   Neck:      Thyroid: No thyromegaly.      Vascular: No JVD.   Cardiovascular:      Rate and Rhythm: Normal rate. Rhythm irregular.      Heart sounds: Normal heart sounds. No murmur heard.     No friction rub. No gallop.   Pulmonary:      Effort: No respiratory distress.      Breath sounds: No wheezing, rhonchi or rales.   Abdominal:      General: There is distension.      Tenderness: There is no abdominal tenderness. There is no guarding or rebound.      Hernia: No hernia is present.   Genitourinary:     Comments: Scrotal edema ++ improved  Musculoskeletal:      Cervical back: Neck supple.      Right lower leg: Edema present.      Left lower leg: Edema present.   Neurological:      Mental Status: He is alert and oriented to person, place, and time. Mental status is at baseline.      Motor: Weakness present.   Psychiatric:         Behavior: Behavior normal.         Thought Content: Thought content normal.                Significant Labs: All pertinent labs within the past 24 hours have been reviewed.  Recent Lab Results         04/20/25  0655        Albumin 1.9       ALP 69       ALT 9       Anion Gap 7       AST 28       Baso # 0.04       Basophil % 0.9       BILIRUBIN TOTAL 3.2  Comment: For infants and newborns, interpretation of results should be based   on gestational age, weight and in agreement with clinical   observations.    Premature Infant recommended reference ranges:   0-24 hours:  <8.0 mg/dL   24-48 hours: <12.0 mg/dL   3-5 days:    <15.0 mg/dL   6-29 days:   <15.0 mg/dL       BUN 18       Calcium 8.1       Chloride 96       CO2 31       Creatinine 1.7       eGFR 43  Comment: Estimated GFR calculated using the  CKD-EPI creatinine (2021) equation.       Eos # 0.20       Eos % 4.3       Glucose 95       Gran # (ANC) 2.73       Hematocrit 29.2       Hemoglobin 9.6       Immature Grans (Abs) 0.03  Comment: Mild elevation in immature granulocytes is non specific and can be seen in a variety of conditions including stress response, acute inflammation, trauma and pregnancy. Correlation with other laboratory and clinical findings is essential.       Immature Granulocytes 0.6       Lymph # 0.91       Lymph % 19.4       Magnesium  1.5       MCH 31.5       MCHC 32.9       MCV 96       Mono # 0.78       Mono % 16.6       MPV 12.1       Neut % 58.2       nRBC 0       Platelet Count 70       Potassium 3.3       PROTEIN TOTAL 6.2       RBC 3.05       RDW 15.9       Sodium 134       WBC 4.69               Significant Imaging: I have reviewed all pertinent imaging results/findings within the past 24 hours.      Assessment & Plan  Decompensated cirrhosis  Patient with known Cirrhosis with Child's class Calculator for Child's score link- https://[email or web address].DataOceans/calc/340/child-ocampo-score-cirrhosis-mortality#creator-insights. Co-morbidities are present and inclusive of esophageal varices, hepatic encephalopathy, and anemia/pancytopenia.  MELD-Na score calculated; MELD 3.0: 27 at 4/15/2025  5:36 AM  MELD-Na: 26 at 4/15/2025  5:36 AM  Calculated from:  Serum Creatinine: 2.2 mg/dL at 4/15/2025  5:36 AM  Serum Sodium: 135 mmol/L at 4/15/2025  5:36 AM  Total Bilirubin: 3.5 mg/dL at 4/15/2025  5:36 AM  Serum Albumin: 2 g/dL at 4/15/2025  5:36 AM  INR(ratio): 1.7 at 4/13/2025 12:42 PM  Age at listing (hypothetical): 69 years  Sex: Male at 4/15/2025  5:36 AM      Continue chronic meds. Etiology likely ETOH. Will avoid any hepatotoxic meds, and monitor CBC/CMP/INR for synthetic function.     4/16 FM:  Slowly improving.  4/18 FM:  Cont to improve.  4/19 FM:  Home soon.  4/20 FM:  Has declined, see above and changes.  Cirrhosis  Patient with known  Cirrhosis with Child's class Calculator for Child's score link- https://www.mGaadi.com/calc/340/child-ocampo-score-cirrhosis-mortality#creator-insights. Co-morbidities are present and inclusive of ascites and anemia/pancytopenia.  MELD-Na score calculated; MELD 3.0: 27 at 4/15/2025  5:36 AM  MELD-Na: 26 at 4/15/2025  5:36 AM  Calculated from:  Serum Creatinine: 2.2 mg/dL at 4/15/2025  5:36 AM  Serum Sodium: 135 mmol/L at 4/15/2025  5:36 AM  Total Bilirubin: 3.5 mg/dL at 4/15/2025  5:36 AM  Serum Albumin: 2 g/dL at 4/15/2025  5:36 AM  INR(ratio): 1.7 at 4/13/2025 12:42 PM  Age at listing (hypothetical): 69 years  Sex: Male at 4/15/2025  5:36 AM      Continue chronic meds. Etiology likely ETOH. Will avoid any hepatotoxic meds, and monitor CBC/CMP/INR for synthetic function.   Patient reported     4/15 FM:  DC above, stopping midrodine.  Longstanding persistent atrial fibrillation  Patient has persistent (7 days or more) atrial fibrillation. Patient is currently in atrial fibrillation. [id number] Score: 1. The patients heart rate in the last 24 hours is as follows:  Pulse  Min: 72  Max: 88     Antiarrhythmics  metoprolol tartrate (LOPRESSOR) tablet 50 mg, 2 times daily, Oral    Anticoagulants       Plan  - Replete lytes with a goal of K>4, Mg >2  - Patient is not anticoagulated due to thrombocytopenia   - Patient's afib is currently uncontrolled. Will adjust treatment as follows: adjust lopressor dose  - cardiology consulted    4/14 FM:  No anticoags 2nd CLD, Plt Count.  4/15 FM:  Cont. Txt plan.  Thrombocytopenia  The likely etiology of thrombocytopenia is liver disease. The patients 3 most recent labs are listed below.  Recent Labs     04/18/25  0553 04/19/25  0608 04/20/25  0655   PLT 52* 72* 70*     Plan  - Will transfuse if platelet count is <20k.    Scrotal edema  4/14 FM:  Not much change overnight, cont IV lasix drip.  4/15 FM:  Improving slowly, echo noted.  4/16 FM:  Slowly improving.  4/17 FM:  Much improved  today.  4/18 FM:  Cont to improve.  4/19 FM:  Transitioning to pulse dosing of lasix.  4/20 FM:  Digressed, addding metolazone, may need UF.    Anasarca  2/2 to cirrhosis, diuresis on board, replete electrolytes, monitor urine output    4/14 FM:  Stopping midrodine today, possible related to volume retention.  4/15 FM:  Slowly improving, need to establish lowest tolerable dry weight.  4/16 FM:  Slowly improving.  4/17 FM:  Cont to improve.  4/18 FM:  Cont to improve.  4/19 FM:  Home soon.  4/20 FM:  As above.  CKD (chronic kidney disease) stage 4, GFR 15-29 ml/min  Creatine stable for now. BMP reviewed- noted Estimated Creatinine Clearance: 58.5 mL/min (A) (based on SCr of 1.7 mg/dL (H)). according to latest data. Based on current GFR, CKD stage is stage 4 - GFR 15-29.  Monitor UOP and serial BMP and adjust therapy as needed. Renally dose meds. Avoid nephrotoxic medications and procedures.    4/14 FM:  Will not DC tunneled cath just yet.  4/15 FM:  Patient is responding to lasix drip.  4/16 FM:  Slowly improving.  4/17 FM:  Improving with diuresis.  4/18 FM:  Cont to improve.  4/19 FM:  Home soon.  4/20 FM:  As above.  Hepatic encephalopathy  4/14 FM:  Stable.  4/15 FM:  Cont current lactulose, adding xifaxin.  4/18 FM:  Cont to improve.    VTE Risk Mitigation (From admission, onward)           Ordered     IP VTE HIGH RISK PATIENT  Once         04/12/25 2327     Place sequential compression device  Until discontinued         04/12/25 2327     Place ANDREW hose  Until discontinued         04/12/25 2327                    Discharge Planning   AUTUMN:      Code Status: Full Code   Medical Readiness for Discharge Date:   Discharge Plan A: Home with family, Home Health                        Joao Villegas III, MD  Department of Hospital Medicine   [facility]

## 2025-04-20 NOTE — ASSESSMENT & PLAN NOTE
Patient with known Cirrhosis with Child's class Calculator for Child's score link- https://www.[email or web address].com/calc/340/child-ocampo-score-cirrhosis-mortality#creator-insights. Co-morbidities are present and inclusive of ascites and anemia/pancytopenia.  MELD-Na score calculated; MELD 3.0: 27 at 4/15/2025  5:36 AM  MELD-Na: 26 at 4/15/2025  5:36 AM  Calculated from:  Serum Creatinine: 2.2 mg/dL at 4/15/2025  5:36 AM  Serum Sodium: 135 mmol/L at 4/15/2025  5:36 AM  Total Bilirubin: 3.5 mg/dL at 4/15/2025  5:36 AM  Serum Albumin: 2 g/dL at 4/15/2025  5:36 AM  INR(ratio): 1.7 at 4/13/2025 12:42 PM  Age at listing (hypothetical): 69 years  Sex: Male at 4/15/2025  5:36 AM      Continue chronic meds. Etiology likely ETOH. Will avoid any hepatotoxic meds, and monitor CBC/CMP/INR for synthetic function.   Patient reported     4/15 FM:  DC above, stopping midrodine.

## 2025-04-20 NOTE — ASSESSMENT & PLAN NOTE
The likely etiology of thrombocytopenia is liver disease. The patients 3 most recent labs are listed below.  Recent Labs     04/18/25  0553 04/19/25  0608 04/20/25  0655   PLT 52* 72* 70*     Plan  - Will transfuse if platelet count is <20k.

## 2025-04-20 NOTE — ASSESSMENT & PLAN NOTE
4/14 FM:  Not much change overnight, cont IV lasix drip.  4/15 FM:  Improving slowly, echo noted.  4/16 FM:  Slowly improving.  4/17 FM:  Much improved today.  4/18 FM:  Cont to improve.  4/19 FM:  Transitioning to pulse dosing of lasix.  4/20 FM:  Digressed, addding metolazone, may need UF.

## 2025-04-20 NOTE — SUBJECTIVE & OBJECTIVE
"Past Medical History:   Diagnosis Date    Atrial fibrillation     Bulging disc     Cirrhosis     Colon polyp 12/29/2017    Rpt 5 yrs    Encounter for blood transfusion     EV (esophageal varices)     Hypertension 03/30/2023    Renal disorder     Skin cancer 03/2017    'NOSE"    Thrombocytopenia        Past Surgical History:   Procedure Laterality Date    CATHETERIZATION OF BOTH LEFT AND RIGHT HEART N/A 02/08/2023    Procedure: CATHETERIZATION, HEART, BOTH LEFT AND RIGHT;  Surgeon: Flo Malone MD;  Location: [facility] CATH LAB;  Service: Cardiology;  Laterality: N/A;  low bleeding risk 1.0%    CHOLECYSTECTOMY      COLONOSCOPY      COLONOSCOPY N/A 12/29/2017    ta rpt 2022    CORONARY ANGIOGRAPHY N/A 02/08/2023    Procedure: ANGIOGRAM, CORONARY ARTERY;  Surgeon: Flo Malone MD;  Location: [facility] CATH LAB;  Service: Cardiology;  Laterality: N/A;    HERNIA REPAIR      INCISION AND DRAINAGE Right 02/27/2025    Procedure: Incision and Drainage, thigh;  Surgeon: Kayla Foster MD;  Location: [facility];  Service: General;  Laterality: Right;  Plan to go first if pt has cardiac clearance - SB    SKIN BIOPSY  03/2017    TONSILLECTOMY      UPPER GASTROINTESTINAL ENDOSCOPY  2011    EV    UPPER GASTROINTESTINAL ENDOSCOPY  2016    VASECTOMY         Review of patient's allergies indicates:  No Known Allergies    No current facility-administered medications on file prior to encounter.     Current Outpatient Medications on File Prior to Encounter   Medication Sig    famotidine (PEPCID) 20 MG tablet Take 1 tablet (20 mg total) by mouth once daily.    furosemide (LASIX) 80 MG tablet Take 1 tablet (80 mg total) by mouth 2 (two) times a day for 3 days, THEN 1 tablet (80 mg total) once daily.    lactulose (CHRONULAC) 20 gram/30 mL Soln Take 45 mLs (30 g total) by mouth 3 (three) times daily.    magnesium chloride (MAG 64) 64 mg TbEC Take 2 tablets (128 mg total) by mouth once daily.    metoprolol tartrate (LOPRESSOR) 25 MG tablet " TAKE 0.5 TABLETS BY MOUTH 2 TIMES DAILY.    midodrine (PROAMATINE) 5 MG Tab Take 3 tablets (15 mg total) by mouth 3 (three) times daily with meals.    potassium bicarbonate (K-LYTE) disintegrating tablet Take 1 tablet (25 mEq total) by mouth 2 (two) times a day.    spironolactone (ALDACTONE) 25 MG tablet Take 1 tablet (25 mg total) by mouth 2 (two) times daily.    tamsulosin (FLOMAX) 0.4 mg Cap Take 1 capsule (0.4 mg total) by mouth once daily.    thiamine 100 MG tablet Take 100 mg by mouth once daily.    apixaban (ELIQUIS) 5 mg Tab Take 5 mg by mouth 2 (two) times daily.     Family History       Problem Relation (Age of Onset)    Alzheimer's disease Mother    Cancer Maternal Uncle    Heart disease Maternal Uncle    Hyperlipidemia Brother    No Known Problems Father    Ovarian cancer Sister          Tobacco Use    Smoking status: Never     Passive exposure: Never    Smokeless tobacco: Never   Substance and Sexual Activity    Alcohol use: Not Currently     Alcohol/week: 0.0 standard drinks of alcohol    Drug use: No    Sexual activity: Not Currently     Review of Systems   Constitutional:  Positive for fatigue. Negative for chills and fever.   HENT:  Negative for congestion.    Eyes:  Negative for visual disturbance.   Respiratory:  Positive for shortness of breath. Negative for wheezing.    Cardiovascular:  Positive for leg swelling. Negative for chest pain and palpitations.   Gastrointestinal:  Positive for abdominal distention. Negative for abdominal pain, constipation, diarrhea, nausea and vomiting.   Endocrine: Negative for polyuria.   Genitourinary:  Positive for scrotal swelling (Scrotal edema). Negative for dysuria.   Musculoskeletal:  Positive for arthralgias. Negative for back pain and gait problem.   Skin:  Negative for wound.   Neurological:  Positive for weakness.   Psychiatric/Behavioral:  The patient is not nervous/anxious.      Objective:     Vital Signs (Most Recent):  Temp: 97.8 °F (36.6 °C)  (04/20/25 0729)  Pulse: 76 (04/20/25 0836)  Resp: 20 (04/20/25 0729)  BP: (!) 114/58 (04/20/25 0836)  SpO2: 98 % (04/20/25 0729) Vital Signs (24h Range):  Temp:  [97.8 °F (36.6 °C)-98.1 °F (36.7 °C)] 97.8 °F (36.6 °C)  Pulse:  [72-88] 76  Resp:  [16-20] 20  SpO2:  [94 %-98 %] 98 %  BP: (104-114)/(52-59) 114/58     Weight: (!) 139.5 kg (307 lb 8 oz)  Body mass index is 40.57 kg/m².     Physical Exam  Vitals and nursing note reviewed.   Constitutional:       General: He is not in acute distress.     Appearance: He is well-developed. He is obese. He is ill-appearing. He is not diaphoretic.   HENT:      Head: Normocephalic and atraumatic.      Nose: No congestion or rhinorrhea.   Eyes:      General: Scleral icterus present.         Right eye: No discharge.         Left eye: No discharge.      Extraocular Movements: Extraocular movements intact.   Neck:      Thyroid: No thyromegaly.      Vascular: No JVD.   Cardiovascular:      Rate and Rhythm: Normal rate. Rhythm irregular.      Heart sounds: Normal heart sounds. No murmur heard.     No friction rub. No gallop.   Pulmonary:      Effort: No respiratory distress.      Breath sounds: No wheezing, rhonchi or rales.   Abdominal:      General: There is distension.      Tenderness: There is no abdominal tenderness. There is no guarding or rebound.      Hernia: No hernia is present.   Genitourinary:     Comments: Scrotal edema ++ improved  Musculoskeletal:      Cervical back: Neck supple.      Right lower leg: Edema present.      Left lower leg: Edema present.   Neurological:      Mental Status: He is alert and oriented to person, place, and time. Mental status is at baseline.      Motor: Weakness present.   Psychiatric:         Behavior: Behavior normal.         Thought Content: Thought content normal.                Significant Labs: All pertinent labs within the past 24 hours have been reviewed.  Recent Lab Results         04/20/25  0655        Albumin 1.9       ALP 69        ALT 9       Anion Gap 7       AST 28       Baso # 0.04       Basophil % 0.9       BILIRUBIN TOTAL 3.2  Comment: For infants and newborns, interpretation of results should be based   on gestational age, weight and in agreement with clinical   observations.    Premature Infant recommended reference ranges:   0-24 hours:  <8.0 mg/dL   24-48 hours: <12.0 mg/dL   3-5 days:    <15.0 mg/dL   6-29 days:   <15.0 mg/dL       BUN 18       Calcium 8.1       Chloride 96       CO2 31       Creatinine 1.7       eGFR 43  Comment: Estimated GFR calculated using the CKD-EPI creatinine (2021) equation.       Eos # 0.20       Eos % 4.3       Glucose 95       Gran # (ANC) 2.73       Hematocrit 29.2       Hemoglobin 9.6       Immature Grans (Abs) 0.03  Comment: Mild elevation in immature granulocytes is non specific and can be seen in a variety of conditions including stress response, acute inflammation, trauma and pregnancy. Correlation with other laboratory and clinical findings is essential.       Immature Granulocytes 0.6       Lymph # 0.91       Lymph % 19.4       Magnesium  1.5       MCH 31.5       MCHC 32.9       MCV 96       Mono # 0.78       Mono % 16.6       MPV 12.1       Neut % 58.2       nRBC 0       Platelet Count 70       Potassium 3.3       PROTEIN TOTAL 6.2       RBC 3.05       RDW 15.9       Sodium 134       WBC 4.69               Significant Imaging: I have reviewed all pertinent imaging results/findings within the past 24 hours.

## 2025-04-20 NOTE — ASSESSMENT & PLAN NOTE
Patient with known Cirrhosis with Child's class Calculator for Child's score link- [email or web address]://[email or web address]/calc/340/child-ocampo-score-cirrhosis-mortality#creator-insights. Co-morbidities are present and inclusive of esophageal varices, hepatic encephalopathy, and anemia/pancytopenia.  MELD-Na score calculated; MELD 3.0: 27 at 4/15/2025  5:36 AM  MELD-Na: 26 at 4/15/2025  5:36 AM  Calculated from:  Serum Creatinine: 2.2 mg/dL at 4/15/2025  5:36 AM  Serum Sodium: 135 mmol/L at 4/15/2025  5:36 AM  Total Bilirubin: 3.5 mg/dL at 4/15/2025  5:36 AM  Serum Albumin: 2 g/dL at 4/15/2025  5:36 AM  INR(ratio): 1.7 at 4/13/2025 12:42 PM  Age at listing (hypothetical): 69 years  Sex: Male at 4/15/2025  5:36 AM      Continue chronic meds. Etiology likely ETOH. Will avoid any hepatotoxic meds, and monitor CBC/CMP/INR for synthetic function.     4/16 FM:  Slowly improving.  4/18 FM:  Cont to improve.  4/19 FM:  Home soon.  4/20 FM:  Has declined, see above and changes.

## 2025-04-20 NOTE — ASSESSMENT & PLAN NOTE
Creatine stable for now. BMP reviewed- noted Estimated Creatinine Clearance: 58.5 mL/min (A) (based on SCr of 1.7 mg/dL (H)). according to latest data. Based on current GFR, CKD stage is stage 4 - GFR 15-29.  Monitor UOP and serial BMP and adjust therapy as needed. Renally dose meds. Avoid nephrotoxic medications and procedures.    4/14 FM:  Will not DC tunneled cath just yet.  4/15 FM:  Patient is responding to lasix drip.  4/16 FM:  Slowly improving.  4/17 FM:  Improving with diuresis.  4/18 FM:  Cont to improve.  4/19 FM:  Home soon.  4/20 FM:  As above.

## 2025-04-20 NOTE — ASSESSMENT & PLAN NOTE
2/2 to cirrhosis, diuresis on board, replete electrolytes, monitor urine output    4/14 FM:  Stopping midrodine today, possible related to volume retention.  4/15 FM:  Slowly improving, need to establish lowest tolerable dry weight.  4/16 FM:  Slowly improving.  4/17 FM:  Cont to improve.  4/18 FM:  Cont to improve.  4/19 FM:  Home soon.  4/20 FM:  As above.

## 2025-04-21 LAB
ABSOLUTE EOSINOPHIL (OHS): 0.2 K/UL
ABSOLUTE MONOCYTE (OHS): 0.66 K/UL (ref 0.3–1)
ABSOLUTE NEUTROPHIL COUNT (OHS): 2.69 K/UL (ref 1.8–7.7)
ALBUMIN SERPL BCP-MCNC: 1.9 G/DL (ref 3.5–5.2)
ALP SERPL-CCNC: 65 UNIT/L (ref 40–150)
ALT SERPL W/O P-5'-P-CCNC: 10 UNIT/L (ref 10–44)
ANION GAP (OHS): 5 MMOL/L (ref 8–16)
AST SERPL-CCNC: 30 UNIT/L (ref 11–45)
BASOPHILS # BLD AUTO: 0.05 K/UL
BASOPHILS NFR BLD AUTO: 1.1 %
BILIRUB SERPL-MCNC: 3.4 MG/DL (ref 0.1–1)
BUN SERPL-MCNC: 24 MG/DL (ref 8–23)
CALCIUM SERPL-MCNC: 8.3 MG/DL (ref 8.7–10.5)
CHLORIDE SERPL-SCNC: 93 MMOL/L (ref 95–110)
CO2 SERPL-SCNC: 34 MMOL/L (ref 23–29)
CREAT SERPL-MCNC: 1.8 MG/DL (ref 0.5–1.4)
ERYTHROCYTE [DISTWIDTH] IN BLOOD BY AUTOMATED COUNT: 16.1 % (ref 11.5–14.5)
GFR SERPLBLD CREATININE-BSD FMLA CKD-EPI: 40 ML/MIN/1.73/M2
GLUCOSE SERPL-MCNC: 125 MG/DL (ref 70–110)
HCT VFR BLD AUTO: 29.3 % (ref 40–54)
HGB BLD-MCNC: 9.6 GM/DL (ref 14–18)
IMM GRANULOCYTES # BLD AUTO: 0.02 K/UL (ref 0–0.04)
IMM GRANULOCYTES NFR BLD AUTO: 0.4 % (ref 0–0.5)
LYMPHOCYTES # BLD AUTO: 0.99 K/UL (ref 1–4.8)
MAGNESIUM SERPL-MCNC: 1.6 MG/DL (ref 1.6–2.6)
MCH RBC QN AUTO: 31.2 PG (ref 27–31)
MCHC RBC AUTO-ENTMCNC: 32.8 G/DL (ref 32–36)
MCV RBC AUTO: 95 FL (ref 82–98)
NUCLEATED RBC (/100WBC) (OHS): 0 /100 WBC
PLATELET # BLD AUTO: 67 K/UL (ref 150–450)
PMV BLD AUTO: 11.5 FL (ref 9.2–12.9)
POTASSIUM SERPL-SCNC: 3.1 MMOL/L (ref 3.5–5.1)
PROT SERPL-MCNC: 6.2 GM/DL (ref 6–8.4)
RBC # BLD AUTO: 3.08 M/UL (ref 4.6–6.2)
RELATIVE EOSINOPHIL (OHS): 4.3 %
RELATIVE LYMPHOCYTE (OHS): 21.5 % (ref 18–48)
RELATIVE MONOCYTE (OHS): 14.3 % (ref 4–15)
RELATIVE NEUTROPHIL (OHS): 58.4 % (ref 38–73)
SODIUM SERPL-SCNC: 132 MMOL/L (ref 136–145)
WBC # BLD AUTO: 4.61 K/UL (ref 3.9–12.7)

## 2025-04-21 PROCEDURE — 80053 COMPREHEN METABOLIC PANEL: CPT | Performed by: INTERNAL MEDICINE

## 2025-04-21 PROCEDURE — 36415 COLL VENOUS BLD VENIPUNCTURE: CPT | Performed by: INTERNAL MEDICINE

## 2025-04-21 PROCEDURE — 25000003 PHARM REV CODE 250: Performed by: INTERNAL MEDICINE

## 2025-04-21 PROCEDURE — 25000003 PHARM REV CODE 250: Performed by: EMERGENCY MEDICINE

## 2025-04-21 PROCEDURE — 63600175 PHARM REV CODE 636 W HCPCS: Performed by: INTERNAL MEDICINE

## 2025-04-21 PROCEDURE — 85025 COMPLETE CBC W/AUTO DIFF WBC: CPT | Performed by: INTERNAL MEDICINE

## 2025-04-21 PROCEDURE — 11000001 HC ACUTE MED/SURG PRIVATE ROOM

## 2025-04-21 PROCEDURE — 83735 ASSAY OF MAGNESIUM: CPT | Performed by: INTERNAL MEDICINE

## 2025-04-21 RX ADMIN — LACTULOSE 20 G: 20 SOLUTION ORAL at 09:04

## 2025-04-21 RX ADMIN — MAGNESIUM 64 MG (MAGNESIUM CHLORIDE) TABLET,DELAYED RELEASE 64 MG: at 09:04

## 2025-04-21 RX ADMIN — FUROSEMIDE 80 MG: 10 INJECTION, SOLUTION INTRAVENOUS at 12:04

## 2025-04-21 RX ADMIN — METOPROLOL TARTRATE 50 MG: 50 TABLET, FILM COATED ORAL at 10:04

## 2025-04-21 RX ADMIN — FAMOTIDINE 20 MG: 20 TABLET, FILM COATED ORAL at 09:04

## 2025-04-21 RX ADMIN — SPIRONOLACTONE 50 MG: 25 TABLET ORAL at 10:04

## 2025-04-21 RX ADMIN — Medication 6 MG: at 10:04

## 2025-04-21 RX ADMIN — Medication 100 MG: at 09:04

## 2025-04-21 RX ADMIN — FUROSEMIDE 80 MG: 10 INJECTION, SOLUTION INTRAVENOUS at 04:04

## 2025-04-21 RX ADMIN — SPIRONOLACTONE 50 MG: 25 TABLET ORAL at 09:04

## 2025-04-21 RX ADMIN — METOPROLOL TARTRATE 50 MG: 50 TABLET, FILM COATED ORAL at 09:04

## 2025-04-21 RX ADMIN — MAGNESIUM 64 MG (MAGNESIUM CHLORIDE) TABLET,DELAYED RELEASE 64 MG: at 10:04

## 2025-04-21 RX ADMIN — POTASSIUM BICARBONATE 20 MEQ: 391 TABLET, EFFERVESCENT ORAL at 09:04

## 2025-04-21 RX ADMIN — RIFAXIMIN 550 MG: 550 TABLET ORAL at 10:04

## 2025-04-21 RX ADMIN — FUROSEMIDE 80 MG: 10 INJECTION, SOLUTION INTRAVENOUS at 08:04

## 2025-04-21 RX ADMIN — TAMSULOSIN HYDROCHLORIDE 0.4 MG: 0.4 CAPSULE ORAL at 09:04

## 2025-04-21 RX ADMIN — POTASSIUM BICARBONATE 20 MEQ: 391 TABLET, EFFERVESCENT ORAL at 10:04

## 2025-04-21 RX ADMIN — LACTULOSE 20 G: 20 SOLUTION ORAL at 10:04

## 2025-04-21 RX ADMIN — METOLAZONE 10 MG: 5 TABLET ORAL at 09:04

## 2025-04-21 RX ADMIN — RIFAXIMIN 550 MG: 550 TABLET ORAL at 09:04

## 2025-04-21 NOTE — ASSESSMENT & PLAN NOTE
Creatine stable for now. BMP reviewed- noted Estimated Creatinine Clearance: 54.5 mL/min (A) (based on SCr of 1.8 mg/dL (H)). according to latest data. Based on current GFR, CKD stage is stage 4 - GFR 15-29.  Monitor UOP and serial BMP and adjust therapy as needed. Renally dose meds. Avoid nephrotoxic medications and procedures.    4/14 FM:  Will not DC tunneled cath just yet.  4/15 FM:  Patient is responding to lasix drip.  4/16 FM:  Slowly improving.  4/17 FM:  Improving with diuresis.  4/18 FM:  Cont to improve.  4/19 FM:  Home soon.  4/20 FM:  As above.

## 2025-04-21 NOTE — SUBJECTIVE & OBJECTIVE
"Past Medical History:   Diagnosis Date    Atrial fibrillation     Bulging disc     Cirrhosis     Colon polyp 12/29/2017    Rpt 5 yrs    Encounter for blood transfusion     EV (esophageal varices)     Hypertension 03/30/2023    Renal disorder     Skin cancer 03/2017    'NOSE"    Thrombocytopenia        Past Surgical History:   Procedure Laterality Date    CATHETERIZATION OF BOTH LEFT AND RIGHT HEART N/A 02/08/2023    Procedure: CATHETERIZATION, HEART, BOTH LEFT AND RIGHT;  Surgeon: Flo Malone MD;  Location: [facility] CATH LAB;  Service: Cardiology;  Laterality: N/A;  low bleeding risk 1.0%    CHOLECYSTECTOMY      COLONOSCOPY      COLONOSCOPY N/A 12/29/2017    ta rpt 2022    CORONARY ANGIOGRAPHY N/A 02/08/2023    Procedure: ANGIOGRAM, CORONARY ARTERY;  Surgeon: Flo Malone MD;  Location: [facility] CATH LAB;  Service: Cardiology;  Laterality: N/A;    HERNIA REPAIR      INCISION AND DRAINAGE Right 02/27/2025    Procedure: Incision and Drainage, thigh;  Surgeon: Kayla Foster MD;  Location: [facility];  Service: General;  Laterality: Right;  Plan to go first if pt has cardiac clearance - SB    SKIN BIOPSY  03/2017    TONSILLECTOMY      UPPER GASTROINTESTINAL ENDOSCOPY  2011    EV    UPPER GASTROINTESTINAL ENDOSCOPY  2016    VASECTOMY         Review of patient's allergies indicates:  No Known Allergies    No current facility-administered medications on file prior to encounter.     Current Outpatient Medications on File Prior to Encounter   Medication Sig    famotidine (PEPCID) 20 MG tablet Take 1 tablet (20 mg total) by mouth once daily.    furosemide (LASIX) 80 MG tablet Take 1 tablet (80 mg total) by mouth 2 (two) times a day for 3 days, THEN 1 tablet (80 mg total) once daily.    lactulose (CHRONULAC) 20 gram/30 mL Soln Take 45 mLs (30 g total) by mouth 3 (three) times daily.    magnesium chloride (MAG 64) 64 mg TbEC Take 2 tablets (128 mg total) by mouth once daily.    metoprolol tartrate (LOPRESSOR) 25 MG tablet " TAKE 0.5 TABLETS BY MOUTH 2 TIMES DAILY.    midodrine (PROAMATINE) 5 MG Tab Take 3 tablets (15 mg total) by mouth 3 (three) times daily with meals.    potassium bicarbonate (K-LYTE) disintegrating tablet Take 1 tablet (25 mEq total) by mouth 2 (two) times a day.    spironolactone (ALDACTONE) 25 MG tablet Take 1 tablet (25 mg total) by mouth 2 (two) times daily.    tamsulosin (FLOMAX) 0.4 mg Cap Take 1 capsule (0.4 mg total) by mouth once daily.    thiamine 100 MG tablet Take 100 mg by mouth once daily.    apixaban (ELIQUIS) 5 mg Tab Take 5 mg by mouth 2 (two) times daily.     Family History       Problem Relation (Age of Onset)    Alzheimer's disease Mother    Cancer Maternal Uncle    Heart disease Maternal Uncle    Hyperlipidemia Brother    No Known Problems Father    Ovarian cancer Sister          Tobacco Use    Smoking status: Never     Passive exposure: Never    Smokeless tobacco: Never   Substance and Sexual Activity    Alcohol use: Not Currently     Alcohol/week: 0.0 standard drinks of alcohol    Drug use: No    Sexual activity: Not Currently     Review of Systems   Constitutional:  Positive for fatigue. Negative for chills and fever.   HENT:  Negative for congestion.    Eyes:  Negative for visual disturbance.   Respiratory:  Positive for shortness of breath. Negative for wheezing.    Cardiovascular:  Positive for leg swelling. Negative for chest pain and palpitations.   Gastrointestinal:  Positive for abdominal distention. Negative for abdominal pain, constipation, diarrhea, nausea and vomiting.   Endocrine: Negative for polyuria.   Genitourinary:  Positive for scrotal swelling (Scrotal edema). Negative for dysuria.   Musculoskeletal:  Positive for arthralgias. Negative for back pain and gait problem.   Skin:  Negative for wound.   Neurological:  Positive for weakness.   Psychiatric/Behavioral:  The patient is not nervous/anxious.      Objective:     Vital Signs (Most Recent):  Temp: 97.9 °F (36.6 °C)  (04/21/25 0716)  Pulse: 74 (04/21/25 0716)  Resp: 12 (04/21/25 0716)  BP: (!) 103/55 (04/21/25 0716)  SpO2: 96 % (04/21/25 0716) Vital Signs (24h Range):  Temp:  [97.9 °F (36.6 °C)-98.4 °F (36.9 °C)] 97.9 °F (36.6 °C)  Pulse:  [73-90] 74  Resp:  [12-18] 12  SpO2:  [96 %-98 %] 96 %  BP: (103-114)/(51-59) 103/55     Weight: 136 kg (299 lb 12.8 oz)  Body mass index is 39.55 kg/m².     Physical Exam  Vitals and nursing note reviewed.   Constitutional:       General: He is not in acute distress.     Appearance: He is well-developed. He is obese. He is ill-appearing. He is not diaphoretic.   HENT:      Head: Normocephalic and atraumatic.      Nose: No congestion or rhinorrhea.   Eyes:      General: Scleral icterus present.         Right eye: No discharge.         Left eye: No discharge.      Extraocular Movements: Extraocular movements intact.   Neck:      Thyroid: No thyromegaly.      Vascular: No JVD.   Cardiovascular:      Rate and Rhythm: Normal rate. Rhythm irregular.      Heart sounds: Normal heart sounds. No murmur heard.     No friction rub. No gallop.   Pulmonary:      Effort: No respiratory distress.      Breath sounds: No wheezing, rhonchi or rales.   Abdominal:      General: There is distension.      Tenderness: There is no abdominal tenderness. There is no guarding or rebound.      Hernia: No hernia is present.   Genitourinary:     Comments: Scrotal edema ++ improved  Musculoskeletal:      Cervical back: Neck supple.      Right lower leg: Edema present.      Left lower leg: Edema present.   Neurological:      Mental Status: He is alert and oriented to person, place, and time. Mental status is at baseline.      Motor: Weakness present.   Psychiatric:         Behavior: Behavior normal.         Thought Content: Thought content normal.                Significant Labs: All pertinent labs within the past 24 hours have been reviewed.  Recent Lab Results         04/21/25  0601   04/20/25  1151        Albumin 1.9          ALP 65         ALT 10         Anion Gap 5         AST 30         Baso # 0.05         Basophil % 1.1         BILIRUBIN TOTAL 3.4  Comment: For infants and newborns, interpretation of results should be based   on gestational age, weight and in agreement with clinical   observations.    Premature Infant recommended reference ranges:   0-24 hours:  <8.0 mg/dL   24-48 hours: <12.0 mg/dL   3-5 days:    <15.0 mg/dL   6-29 days:   <15.0 mg/dL         BUN 24         Calcium 8.3         Chloride 93         CO2 34         Creatinine 1.8         eGFR 40  Comment: Estimated GFR calculated using the CKD-EPI creatinine (2021) equation.         Eos # 0.20         Eos % 4.3         Glucose 125         Gran # (ANC) 2.69         Hematocrit 29.3         Hemoglobin 9.6         Immature Grans (Abs) 0.02  Comment: Mild elevation in immature granulocytes is non specific and can be seen in a variety of conditions including stress response, acute inflammation, trauma and pregnancy. Correlation with other laboratory and clinical findings is essential.         Immature Granulocytes 0.4         Lymph # 0.99         Lymph % 21.5         Magnesium  1.6         MCH 31.2         MCHC 32.8         MCV 95         Mono # 0.66         Mono % 14.3         MPV 11.5         Neut % 58.4         nRBC 0         Platelet Count 67         POCT Glucose   124       Potassium 3.1         PROTEIN TOTAL 6.2         RBC 3.08         RDW 16.1         Sodium 132         WBC 4.61                 Significant Imaging: I have reviewed all pertinent imaging results/findings within the past 24 hours.

## 2025-04-21 NOTE — ASSESSMENT & PLAN NOTE
Patient with known Cirrhosis with Child's class Calculator for Child's score link- [email or web address]/child-ocampo-score-cirrhosis-mortality#creator-insights. Co-morbidities are present and inclusive of ascites and anemia/pancytopenia.  MELD-Na score calculated; MELD 3.0: 27 at 4/15/2025  5:36 AM  MELD-Na: 26 at 4/15/2025  5:36 AM  Calculated from:  Serum Creatinine: 2.2 mg/dL at 4/15/2025  5:36 AM  Serum Sodium: 135 mmol/L at 4/15/2025  5:36 AM  Total Bilirubin: 3.5 mg/dL at 4/15/2025  5:36 AM  Serum Albumin: 2 g/dL at 4/15/2025  5:36 AM  INR(ratio): 1.7 at 4/13/2025 12:42 PM  Age at listing (hypothetical): 69 years  Sex: Male at 4/15/2025  5:36 AM      Continue chronic meds. Etiology likely ETOH. Will avoid any hepatotoxic meds, and monitor CBC/CMP/INR for synthetic function.   Patient reported     4/15 FM:  DC above, stopping midrodine.

## 2025-04-21 NOTE — ASSESSMENT & PLAN NOTE
4/14 FM:  Not much change overnight, cont IV lasix drip.  4/15 FM:  Improving slowly, echo noted.  4/16 FM:  Slowly improving.  4/17 FM:  Much improved today.  4/18 FM:  Cont to improve.  4/19 FM:  Transitioning to pulse dosing of lasix.  4/20 FM:  Digressed, addding metolazone, may need UF.  4/21 FM:  Has responded to change in meds.

## 2025-04-21 NOTE — ASSESSMENT & PLAN NOTE
2/2 to cirrhosis, diuresis on board, replete electrolytes, monitor urine output    4/14 FM:  Stopping midrodine today, possible related to volume retention.  4/15 FM:  Slowly improving, need to establish lowest tolerable dry weight.  4/16 FM:  Slowly improving.  4/17 FM:  Cont to improve.  4/18 FM:  Cont to improve.  4/19 FM:  Home soon.  4/20 FM:  As above.  4/21 FM: Has responded to change in meds.

## 2025-04-21 NOTE — PROGRESS NOTES
[facility] Medicine  Progress Note    Patient Name: Juan Carlos Yoo Sr.  MRN: [id number]  Patient Class: IP- Inpatient   Admission Date: 4/12/2025  Length of Stay: 7 days  Attending Physician: Joao Villegas III, MD  Primary Care Provider: Joao Villegas III, MD        Subjective     Principal Problem:Decompensated cirrhosis        HPI:  Patient 69-year-old male history of AFib, cirrhosis, hypertension came to the ED with complaints of increased swelling to testicular region, lower extremity, patient had been seen previously for same issue, patient is on Lasix and Aldactone, increased pain due to swelling, associated orthopnea and dyspnea on exertion reported no nausea vomiting reported, patient denied any fever chills    Overview/Hospital Course:  4/14 FM:  Patient known to me, patient presented to the emergency department with an acute 6 lb weight gain severe painful scrotal edema and dyspnea.  Patient is on an IV Lasix drip with some response.  Scrotum is still massive today.  Patient is not taking anticoagulation because of his thrombocytopenia and cirrhosis cardiology has been consulted patient also had a planned surgical removal of his tunneled dialysis catheter in the right chest for this week which may be on hold depending on how he responds to treatment this admission.  I have contacted surgery.  4/15 FM:  Patient is responding to the Lasix drip and had decent urine output, patient's scrotal edema has reduced about 30%.  Patient's electrolytes noted we will begin replacing potassium and magnesium as we diurese patient's midodrine was discontinued and I have explained to him that he will likely have chronic hypotension and once we get to a adequate volume level we will see how he tolerates his blood pressures.  We will likely require a prolonged hospitalization.  4/16 FM:  Patient is diuresing slowly, patient's weight has not changed much on measurement but question accuracy.  Patient's  "scrotal edema is improved some but still persist.  Patient's creatinine is down on IV Lasix drip, we will consult patient's nephrologist to follow along.  Leaving Vas-Cath in place for now.  Patient's getting out of bed and working with therapy which I have continued to encourage.  Midodrine is on hold which I do suspect was contributing to volume retention.  4/17 FM:  Patient's diuresing well and has had a significant improvement in the last 24 hours.  Patient's electrolytes noted continue repletion continue hospitalization and IV Lasix drip for another 24 hours.  We will likely transition to p.o. in a.m..  Mental status seems to be improved keep working with therapy.  4/18 FM:  Patient is continuing to diurese scrotal edema almost resolved patient's still has quite a bit of volume left to attempt remove blood pressure improved creatinine improved.  Continue to press forward with current treatment.  4/19 FM:  Patient has now been on a Lasix drip for the last 6 days, we will transitioned to pulse dosing and then p.o..  Patient has lost significant edema and fluid weight but still has a ways to go.  Can likely transition to p.o. and continue this at home.  4/20 FM:  Patient's diuresis has halted and he actually gained a 1 lb overnight.  I have reached out to the patient's nephrologist and she is adding metolazone daily and will ultrafiltrate if no progress tomorrow.  4/21 FM:  Patient had significant diuresis through the night with the addition of metolazone, patient's -3800 mL.  Patient's edema improved his weight is down we will continue with current medications renal assisting and following.    Past Medical History:   Diagnosis Date    Atrial fibrillation     Bulging disc     Cirrhosis     Colon polyp 12/29/2017    Rpt 5 yrs    Encounter for blood transfusion     EV (esophageal varices)     Hypertension 03/30/2023    Renal disorder     Skin cancer 03/2017    'NOSE"    Thrombocytopenia        Past Surgical History: "   Procedure Laterality Date    CATHETERIZATION OF BOTH LEFT AND RIGHT HEART N/A 02/08/2023    Procedure: CATHETERIZATION, HEART, BOTH LEFT AND RIGHT;  Surgeon: Flo Malone MD;  Location: [facility] CATH LAB;  Service: Cardiology;  Laterality: N/A;  low bleeding risk 1.0%    CHOLECYSTECTOMY      COLONOSCOPY      COLONOSCOPY N/A 12/29/2017    ta rpt 2022    CORONARY ANGIOGRAPHY N/A 02/08/2023    Procedure: ANGIOGRAM, CORONARY ARTERY;  Surgeon: Flo Malone MD;  Location: [facility] CATH LAB;  Service: Cardiology;  Laterality: N/A;    HERNIA REPAIR      INCISION AND DRAINAGE Right 02/27/2025    Procedure: Incision and Drainage, thigh;  Surgeon: Kayla Foster MD;  Location: [facility];  Service: General;  Laterality: Right;  Plan to go first if pt has cardiac clearance - SB    SKIN BIOPSY  03/2017    TONSILLECTOMY      UPPER GASTROINTESTINAL ENDOSCOPY  2011    EV    UPPER GASTROINTESTINAL ENDOSCOPY  2016    VASECTOMY         Review of patient's allergies indicates:  No Known Allergies    No current facility-administered medications on file prior to encounter.     Current Outpatient Medications on File Prior to Encounter   Medication Sig    famotidine (PEPCID) 20 MG tablet Take 1 tablet (20 mg total) by mouth once daily.    furosemide (LASIX) 80 MG tablet Take 1 tablet (80 mg total) by mouth 2 (two) times a day for 3 days, THEN 1 tablet (80 mg total) once daily.    lactulose (CHRONULAC) 20 gram/30 mL Soln Take 45 mLs (30 g total) by mouth 3 (three) times daily.    magnesium chloride (MAG 64) 64 mg TbEC Take 2 tablets (128 mg total) by mouth once daily.    metoprolol tartrate (LOPRESSOR) 25 MG tablet TAKE 0.5 TABLETS BY MOUTH 2 TIMES DAILY.    midodrine (PROAMATINE) 5 MG Tab Take 3 tablets (15 mg total) by mouth 3 (three) times daily with meals.    potassium bicarbonate (K-LYTE) disintegrating tablet Take 1 tablet (25 mEq total) by mouth 2 (two) times a day.    spironolactone (ALDACTONE) 25 MG tablet Take 1 tablet (25 mg  total) by mouth 2 (two) times daily.    tamsulosin (FLOMAX) 0.4 mg Cap Take 1 capsule (0.4 mg total) by mouth once daily.    thiamine 100 MG tablet Take 100 mg by mouth once daily.    apixaban (ELIQUIS) 5 mg Tab Take 5 mg by mouth 2 (two) times daily.     Family History       Problem Relation (Age of Onset)    Alzheimer's disease Mother    Cancer Maternal Uncle    Heart disease Maternal Uncle    Hyperlipidemia Brother    No Known Problems Father    Ovarian cancer Sister          Tobacco Use    Smoking status: Never     Passive exposure: Never    Smokeless tobacco: Never   Substance and Sexual Activity    Alcohol use: Not Currently     Alcohol/week: 0.0 standard drinks of alcohol    Drug use: No    Sexual activity: Not Currently     Review of Systems   Constitutional:  Positive for fatigue. Negative for chills and fever.   HENT:  Negative for congestion.    Eyes:  Negative for visual disturbance.   Respiratory:  Positive for shortness of breath. Negative for wheezing.    Cardiovascular:  Positive for leg swelling. Negative for chest pain and palpitations.   Gastrointestinal:  Positive for abdominal distention. Negative for abdominal pain, constipation, diarrhea, nausea and vomiting.   Endocrine: Negative for polyuria.   Genitourinary:  Positive for scrotal swelling (Scrotal edema). Negative for dysuria.   Musculoskeletal:  Positive for arthralgias. Negative for back pain and gait problem.   Skin:  Negative for wound.   Neurological:  Positive for weakness.   Psychiatric/Behavioral:  The patient is not nervous/anxious.      Objective:     Vital Signs (Most Recent):  Temp: 97.9 °F (36.6 °C) (04/21/25 0716)  Pulse: 74 (04/21/25 0716)  Resp: 12 (04/21/25 0716)  BP: (!) 103/55 (04/21/25 0716)  SpO2: 96 % (04/21/25 0716) Vital Signs (24h Range):  Temp:  [97.9 °F (36.6 °C)-98.4 °F (36.9 °C)] 97.9 °F (36.6 °C)  Pulse:  [73-90] 74  Resp:  [12-18] 12  SpO2:  [96 %-98 %] 96 %  BP: (103-114)/(51-59) 103/55     Weight: 136 kg  (299 lb 12.8 oz)  Body mass index is 39.55 kg/m².     Physical Exam  Vitals and nursing note reviewed.   Constitutional:       General: He is not in acute distress.     Appearance: He is well-developed. He is obese. He is ill-appearing. He is not diaphoretic.   HENT:      Head: Normocephalic and atraumatic.      Nose: No congestion or rhinorrhea.   Eyes:      General: Scleral icterus present.         Right eye: No discharge.         Left eye: No discharge.      Extraocular Movements: Extraocular movements intact.   Neck:      Thyroid: No thyromegaly.      Vascular: No JVD.   Cardiovascular:      Rate and Rhythm: Normal rate. Rhythm irregular.      Heart sounds: Normal heart sounds. No murmur heard.     No friction rub. No gallop.   Pulmonary:      Effort: No respiratory distress.      Breath sounds: No wheezing, rhonchi or rales.   Abdominal:      General: There is distension.      Tenderness: There is no abdominal tenderness. There is no guarding or rebound.      Hernia: No hernia is present.   Genitourinary:     Comments: Scrotal edema ++ improved  Musculoskeletal:      Cervical back: Neck supple.      Right lower leg: Edema present.      Left lower leg: Edema present.   Neurological:      Mental Status: He is alert and oriented to person, place, and time. Mental status is at baseline.      Motor: Weakness present.   Psychiatric:         Behavior: Behavior normal.         Thought Content: Thought content normal.                Significant Labs: All pertinent labs within the past 24 hours have been reviewed.  Recent Lab Results         04/21/25  0601   04/20/25  1151        Albumin 1.9         ALP 65         ALT 10         Anion Gap 5         AST 30         Baso # 0.05         Basophil % 1.1         BILIRUBIN TOTAL 3.4  Comment: For infants and newborns, interpretation of results should be based   on gestational age, weight and in agreement with clinical   observations.    Premature Infant recommended reference  ranges:   0-24 hours:  <8.0 mg/dL   24-48 hours: <12.0 mg/dL   3-5 days:    <15.0 mg/dL   6-29 days:   <15.0 mg/dL         BUN 24         Calcium 8.3         Chloride 93         CO2 34         Creatinine 1.8         eGFR 40  Comment: Estimated GFR calculated using the CKD-EPI creatinine (2021) equation.         Eos # 0.20         Eos % 4.3         Glucose 125         Gran # (ANC) 2.69         Hematocrit 29.3         Hemoglobin 9.6         Immature Grans (Abs) 0.02  Comment: Mild elevation in immature granulocytes is non specific and can be seen in a variety of conditions including stress response, acute inflammation, trauma and pregnancy. Correlation with other laboratory and clinical findings is essential.         Immature Granulocytes 0.4         Lymph # 0.99         Lymph % 21.5         Magnesium  1.6         MCH 31.2         MCHC 32.8         MCV 95         Mono # 0.66         Mono % 14.3         MPV 11.5         Neut % 58.4         nRBC 0         Platelet Count 67         POCT Glucose   124       Potassium 3.1         PROTEIN TOTAL 6.2         RBC 3.08         RDW 16.1         Sodium 132         WBC 4.61                 Significant Imaging: I have reviewed all pertinent imaging results/findings within the past 24 hours.      Assessment & Plan  Decompensated cirrhosis  Patient with known Cirrhosis with Child's class Calculator for Child's score link- https://www.mdcalc.com/calc/340/child-ocampo-score-cirrhosis-mortality#creator-insights. Co-morbidities are present and inclusive of esophageal varices, hepatic encephalopathy, and anemia/pancytopenia.  MELD-Na score calculated; MELD 3.0: 27 at 4/15/2025  5:36 AM  MELD-Na: 26 at 4/15/2025  5:36 AM  Calculated from:  Serum Creatinine: 2.2 mg/dL at 4/15/2025  5:36 AM  Serum Sodium: 135 mmol/L at 4/15/2025  5:36 AM  Total Bilirubin: 3.5 mg/dL at 4/15/2025  5:36 AM  Serum Albumin: 2 g/dL at 4/15/2025  5:36 AM  INR(ratio): 1.7 at 4/13/2025 12:42 PM  Age at listing  (hypothetical): 69 years  Sex: Male at 4/15/2025  5:36 AM      Continue chronic meds. Etiology likely ETOH. Will avoid any hepatotoxic meds, and monitor CBC/CMP/INR for synthetic function.     4/16 FM:  Slowly improving.  4/18 FM:  Cont to improve.  4/19 FM:  Home soon.  4/20 FM:  Has declined, see above and changes.  4/21 FM: Has responded to change in meds.   Cirrhosis  Patient with known Cirrhosis with Child's class Calculator for Child's score link- https://www.[facility]/calc/340/child-ocampo-score-cirrhosis-mortality#creator-insights. Co-morbidities are present and inclusive of ascites and anemia/pancytopenia.  MELD-Na score calculated; MELD 3.0: 27 at 4/15/2025  5:36 AM  MELD-Na: 26 at 4/15/2025  5:36 AM  Calculated from:  Serum Creatinine: 2.2 mg/dL at 4/15/2025  5:36 AM  Serum Sodium: 135 mmol/L at 4/15/2025  5:36 AM  Total Bilirubin: 3.5 mg/dL at 4/15/2025  5:36 AM  Serum Albumin: 2 g/dL at 4/15/2025  5:36 AM  INR(ratio): 1.7 at 4/13/2025 12:42 PM  Age at listing (hypothetical): 69 years  Sex: Male at 4/15/2025  5:36 AM      Continue chronic meds. Etiology likely ETOH. Will avoid any hepatotoxic meds, and monitor CBC/CMP/INR for synthetic function.   Patient reported     4/15 FM:  DC above, stopping midrodine.  Longstanding persistent atrial fibrillation  Patient has persistent (7 days or more) atrial fibrillation. Patient is currently in atrial fibrillation. [id number] Score: 1. The patients heart rate in the last 24 hours is as follows:  Pulse  Min: 73  Max: 90     Antiarrhythmics  metoprolol tartrate (LOPRESSOR) tablet 50 mg, 2 times daily, Oral    Anticoagulants       Plan  - Replete lytes with a goal of K>4, Mg >2  - Patient is not anticoagulated due to thrombocytopenia   - Patient's afib is currently uncontrolled. Will adjust treatment as follows: adjust lopressor dose  - cardiology consulted    4/14 FM:  No anticoags 2nd CLD, Plt Count.  4/15 FM:  Cont. Txt plan.  Thrombocytopenia  The likely etiology  of thrombocytopenia is liver disease. The patients 3 most recent labs are listed below.  Recent Labs     04/19/25  0608 04/20/25  0655 04/21/25  0601   PLT 72* 70* 67*     Plan  - Will transfuse if platelet count is <20k.    Scrotal edema  4/14 FM:  Not much change overnight, cont IV lasix drip.  4/15 FM:  Improving slowly, echo noted.  4/16 FM:  Slowly improving.  4/17 FM:  Much improved today.  4/18 FM:  Cont to improve.  4/19 FM:  Transitioning to pulse dosing of lasix.  4/20 FM:  Digressed, addding metolazone, may need UF.  4/21 FM:  Has responded to change in meds.    Anasarca  2/2 to cirrhosis, diuresis on board, replete electrolytes, monitor urine output    4/14 FM:  Stopping midrodine today, possible related to volume retention.  4/15 FM:  Slowly improving, need to establish lowest tolerable dry weight.  4/16 FM:  Slowly improving.  4/17 FM:  Cont to improve.  4/18 FM:  Cont to improve.  4/19 FM:  Home soon.  4/20 FM:  As above.  4/21 FM: Has responded to change in meds.   CKD (chronic kidney disease) stage 4, GFR 15-29 ml/min  Creatine stable for now. BMP reviewed- noted Estimated Creatinine Clearance: 54.5 mL/min (A) (based on SCr of 1.8 mg/dL (H)). according to latest data. Based on current GFR, CKD stage is stage 4 - GFR 15-29.  Monitor UOP and serial BMP and adjust therapy as needed. Renally dose meds. Avoid nephrotoxic medications and procedures.    4/14 FM:  Will not DC tunneled cath just yet.  4/15 FM:  Patient is responding to lasix drip.  4/16 FM:  Slowly improving.  4/17 FM:  Improving with diuresis.  4/18 FM:  Cont to improve.  4/19 FM:  Home soon.  4/20 FM:  As above.  Hepatic encephalopathy  4/14 FM:  Stable.  4/15 FM:  Cont current lactulose, adding xifaxin.  4/18 FM:  Cont to improve.    VTE Risk Mitigation (From admission, onward)           Ordered     IP VTE HIGH RISK PATIENT  Once         04/12/25 3127     Place sequential compression device  Until discontinued         04/12/25 8325      Place ANDREW hose  Until discontinued         04/12/25 7717                    Discharge Planning   AUTUMN:      Code Status: Full Code   Medical Readiness for Discharge Date:   Discharge Plan A: Home with family, Home Health                        Joao Villegas III, MD  Department of Hospital Medicine   [facility]

## 2025-04-21 NOTE — PROGRESS NOTES
[facility]  Cardiology  Progress Note    Patient Name: Juan Carlos Yoo Sr.  MRN: [id number]  Admission Date: 4/12/2025  Hospital Length of Stay: 9 days  Code Status: Full Code   Attending Physician: Joao Villegas III, MD   Primary Care Physician: Joao Villegas III, MD  Expected Discharge Date:   Principal Problem:Decompensated cirrhosis    Subjective:     Hospital Course:       Patient is a 70 yo male with a history of chronic atrial fibrillation, chronic diastolic heart failure, cirrhosis, and prior need for dialysis presenting with worsening bilateral lower extremity edema and scrotal swelling. Patient reports waking up recently with increased swelling that progressively worsened, particularly affecting his genitals, causing significant discomfort with ambulation. He presented to the ED yesterday and was admitted. He reports compliance with his medications including Lasix and spironolactone, though occasionally taking doses 1-2 hours late. He endorses mild intermittent exertional dyspnea, notably while walking to his truck, but denies orthopnea. Denies fever. Patient has a dialysis port scheduled for removal this Wednesday, with pre-op clearance obtained from cardiology last Friday. Recent echocardiogram in December showed preserved cardiac function with known atrial fibrillation. EKG from the 21st confirmed chronic A-fib. Patient denies palpitations or awareness of irregular rhythm. 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 Denies history of smoking or illicit drug use. No prior cardiac stents.    Interval History:     4/14/25: Patient reports improved shortness of breath. He reports stable scrotal and bilateral lower extremity edema. Telemetry this morning shows atrial fibrillation with a controlled rate. -156 mL urine output since yesterday. Hemodynamics well controlled overnight. Electrolytes stable this morning.  Renal function worsening since admission.     4/15/25:  Patient reports improved scrotal edema and bilateral lower extremity edema. He denies shortness of breath and chest pain. Telemetry this morning shows atrial fibrillation with a heart rate in the 80s- 90s. -2450 mL urine output recorded since yesterday. Hemodynamics well controlled overnight. Electrolytes stable. Renal function continuing to worsen with a creatinine of 2.2 and GFR of 31. Liver function stable.        4/16/25: Patient reports improved scrotal and bilateral lower extremity edema. He denies chest pain and shortness of breath. Telemetry this morning shows atrial fibrillation with a heart rate varying from 90s- 120s. -1900 mL urine output since yesterday. Hypotension noted this morning with a blood pressure of 99/50. Improving kidney function.      4/17/25: Patient continues to report improved scrotal and bilateral lower extremity edema. He denies chest pain and shortness of breath. Telemetry this morning shows atrial fibrillation with a rate in the 90s. -2500 mL urine output since yesterday. Hypotension noted overnight with blood pressure in the 90s/ 50s. Kidney function continuing to improve.     4/21/25: Lasix drip was transitioned to 80 mg IV BID over the weekend. Metolazone 10 mg daily added to patient's regimen by nephrologist. This morning, patient reports improved scrotal and BLE edema. Hemodynamics well controlled overnight. -3,000 mL urine output overnight. Liver function stable this morning.        Review of Systems   Constitutional: Negative for chills and fever.   HENT: Negative.     Cardiovascular:  Positive for leg swelling. Negative for chest pain and palpitations.   Respiratory:  Positive for shortness of breath. Negative for cough and wheezing.    Gastrointestinal:  Positive for bloating. Negative for abdominal pain, nausea and vomiting.   Genitourinary:         Positive for scrotal edema     Objective:     Vital Signs (Most  Recent):  Temp: 98 °F (36.7 °C) (04/23/25 1113)  Pulse: 89 (04/23/25 1113)  Resp: 18 (04/23/25 1113)  BP: (!) 111/56 (04/23/25 1113)  SpO2: 96 % (04/23/25 1113) Vital Signs (24h Range):  Temp:  [97.8 °F (36.6 °C)-98.4 °F (36.9 °C)] 98 °F (36.7 °C)  Pulse:  [77-91] 89  Resp:  [16-18] 18  SpO2:  [91 %-99 %] 96 %  BP: ()/(51-63) 111/56     Weight: 126 kg (277 lb 12.8 oz)  Body mass index is 36.65 kg/m².    SpO2: 96 %         Intake/Output Summary (Last 24 hours) at 4/23/2025 1543  Last data filed at 4/23/2025 0715  Gross per 24 hour   Intake 240 ml   Output 4150 ml   Net -3910 ml       Lines/Drains/Airways       Central Venous Catheter Line  Duration                  Hemodialysis Catheter 12/16/24 1152 right internal jugular 128 days              Drain  Duration                  Open Drain 02/27/25 0815 Tube - 1 Right Thigh Penrose 1/4 inch 55 days              Peripheral Intravenous Line  Duration                  Peripheral IV - Single Lumen 04/20/25 0030 20 G Anterior;Left Forearm 3 days                    Physical Exam  Constitutional:       General: He is not in acute distress.     Appearance: He is obese.   HENT:      Head: Normocephalic and atraumatic.   Eyes:      Extraocular Movements: Extraocular movements intact.   Cardiovascular:      Pulses: Normal pulses.      Heart sounds: Normal heart sounds.   Pulmonary:      Effort: Pulmonary effort is normal.      Breath sounds: Normal breath sounds.   Abdominal:      General: There is distension.   Musculoskeletal:      Cervical back: Normal range of motion and neck supple.      Right lower leg: Edema present.      Left lower leg: Edema present.   Skin:     General: Skin is warm and dry.      Capillary Refill: Capillary refill takes less than 2 seconds.   Neurological:      Mental Status: He is alert. Mental status is at baseline.   Psychiatric:         Mood and Affect: Mood normal.         Behavior: Behavior normal.         Significant Labs: BMP:   Recent  "Labs   Lab 04/22/25  0645 04/23/25  0546   * 134*   K 3.2* 3.7   CL 90* 88*   CO2 34* 38*   BUN 26* 28*   CREATININE 2.0* 1.9*   CALCIUM 8.7 8.8   MG 1.5* 1.4*   , CMP   Recent Labs   Lab 04/22/25  0645 04/23/25  0546   * 134*   K 3.2* 3.7   CL 90* 88*   CO2 34* 38*   BUN 26* 28*   CREATININE 2.0* 1.9*   CALCIUM 8.7 8.8   ALBUMIN 1.9* 1.9*   BILITOT 3.3* 3.0*   ALKPHOS 68 73   AST 28 28   ALT 9* 8*   ANIONGAP 9 8   , CBC   Recent Labs   Lab 04/22/25  0645 04/23/25  0546   WBC 5.18 5.44   HGB 9.8* 9.9*   HCT 29.6* 29.9*   PLT 79* 79*   , Lipid Panel No results for input(s): "CHOL", "HDL", "LDLCALC", "TRIG", "CHOLHDL" in the last 48 hours., Troponin No results for input(s): "TROPONINIHS" in the last 48 hours., and All pertinent lab results from the last 24 hours have been reviewed.    Significant Imaging:     Echocardiogram: Transthoracic echo (TTE) complete (Cupid Only):     Results for orders placed or performed during the hospital encounter of 04/12/25   Echo   Result Value Ref Range    BSA 2.65 m2    LVOT stroke volume 60.9 cm3    LVIDd 5.3 3.5 - 6.0 cm    LV Systolic Volume 58 mL    LV Systolic Volume Index 22.7 mL/m2    LVIDs 3.7 2.1 - 4.0 cm    LV Diastolic Volume 135 mL    LV Diastolic Volume Index 52.73 mL/m2    Left Ventricular End Systolic Volume by Teichholz Method 57.57 mL    Left Ventricular End Diastolic Volume by Teichholz Method 134.91 mL    IVS 1.1 0.6 - 1.1 cm    LVOT diameter 2.1 cm    LVOT area 3.5 cm2    FS 30.2 28 - 44 %    Left Ventricle Relative Wall Thickness 0.34 cm    PW 0.9 0.6 - 1.1 cm    LV mass 200.4 g    LV Mass Index 78.3 g/m2    MV Peak E Owen 1.42 m/s    TDI LATERAL 0.11 m/s    TDI SEPTAL 0.13 m/s    E/E' ratio 12 m/s    TR Max Owen 3.2 m/s    E wave deceleration time 208 msec    LV SEPTAL E/E' RATIO 10.9 m/s    LV LATERAL E/E' RATIO 12.9 m/s    LVOT peak owen 0.8 m/s    Left Ventricular Outflow Tract Mean Velocity 0.57 cm/s    Left Ventricular Outflow Tract Mean Gradient " 1.46 mmHg    RV- wilson basal diam 3.6 cm    RV/LV Ratio 0.68 cm    LA size 6.0 cm    Left Atrium Major Axis 7.3 cm    RA Major Axis 6.50 cm    AV mean gradient 4 mmHg    AV peak gradient 7 mmHg    Ao peak rodney 1.3 m/s    Ao VTI 27.5 cm    LVOT peak VTI 17.6 cm    AV valve area 2.2 cm²    AV Velocity Ratio 0.62     AV index (prosthetic) 0.64     TONY by Velocity Ratio 2.1 cm²    Mr max rodney 4.58 m/s    MV stenosis pressure 1/2 time 60.45 ms    MV valve area p 1/2 method 3.64 cm2    Triscuspid Valve Regurgitation Peak Gradient 41 mmHg    RVOT peak rodney 0.58 m/s    Ao root annulus 3.39 cm    IVC diameter 3.29 cm    Mean e' 0.12 m/s    ZLVIDS -7.60     ZLVIDD -11.30     RVDD 3.60 cm    AORTIC VALVE CUSP SEPERATION 1.15 cm    TAPSE 1.80 cm    TV resting pulmonary artery pressure 56 mmHg    RV TB RVSP 18 mmHg    Est. RA pres 15 mmHg    Narrative      Left Ventricle: The left ventricle is normal in size. Mildly increased   wall thickness. There is normal systolic function with a visually   estimated ejection fraction of 55 - 60%. Unable to assess diastolic   function due to atrial fibrillation.    Right Ventricle: The right ventricle has moderate enlargement. Systolic   function is normal.    Left Atrium: Severely dilated    Right Atrium: Right atrium is moderately dilated.    Aortic Valve: The aortic valve is a trileaflet valve. There is mild   aortic valve sclerosis.    Mitral Valve: There is mild regurgitation.    Tricuspid Valve: There is mild to moderate regurgitation.    IVC/SVC: Elevated venous pressure at 15 mmHg.    Left pleural effusion.          Assessment and Plan:    Acute exacerbation of HFpEF: Patient presented to Emergency Department with SOB, scrotal edema, and BLE edema. He was started on IV furosemide. This morning, he reports improving SOB. He also reports improving scrotal and BLE edema; however, not at baseline.             - Continue IV Furosemide 80 mg BID            - Continue metolazone 10 mg daily             - Continue spironolactone as dosed            - Monitor daily I's & O's            - Low Na+ diet            - Echocardiogram shows normal LV systolic function with an EF of 55- 60%. It also shows mild MR and moderate TR.      Atrial Fibrillation: Patient with history of atrial fibrillation. Telemetry this morning continues to show atrial fibrillation with a controlled rate. He is not currently on anticoagulation given cirrhosis.            - Continue metoprolol tartrate as dosed           - Continue telemetry           - Agree with no anticoagulation given cirrhosis     Cirrhosis: Management per primary team          Active Diagnoses:    Diagnosis Date Noted POA    PRINCIPAL PROBLEM:  Decompensated cirrhosis [K72.90, K74.60] 10/04/2024 Yes    Scrotal edema [N50.89] 04/13/2025 Yes    Anasarca [R60.1] 04/13/2025 Yes    Thrombocytopenia [D69.6] 11/21/2024 Yes    Hepatic encephalopathy [K76.82] 11/21/2024 Yes    CKD (chronic kidney disease) stage 4, GFR 15-29 ml/min [N18.4] 11/20/2024 Yes    Longstanding persistent atrial fibrillation [I48.11] 11/29/2022 Yes    Cirrhosis [K74.60] 07/24/2014 Yes      Problems Resolved During this Admission:       VTE Risk Mitigation (From admission, onward)           Ordered     IP VTE HIGH RISK PATIENT  Once         04/12/25 2327     Place sequential compression device  Until discontinued         04/12/25 2327     Place ANDREW hose  Until discontinued         04/12/25 2327                  Provider's Attestation:  I, Luis Liu MD, confirm that OCTAVIA Starr worked under my direction at all times.  Documentation shall reflect findings and decisions made by myself in the course of the patient's treatment.  I have performed a face to face evaluation of the patient and reviewed the medical record. In addition, I have performed the substantive portion of the medical decision making. I have reviewed the notes and assessment, and I concur with her documentation.  CMS  guidelines regarding the use of scribes shall be adhered to.  MD Luis Lewis MD  Cardiology  [facility]

## 2025-04-21 NOTE — ASSESSMENT & PLAN NOTE
Patient with known Cirrhosis with Child's class Calculator for Child's score link- https://www.CastingDB.com/calc/340/child-ocampo-score-cirrhosis-mortality#creator-insights. Co-morbidities are present and inclusive of esophageal varices, hepatic encephalopathy, and anemia/pancytopenia.  MELD-Na score calculated; MELD 3.0: 27 at 4/15/2025  5:36 AM  MELD-Na: 26 at 4/15/2025  5:36 AM  Calculated from:  Serum Creatinine: 2.2 mg/dL at 4/15/2025  5:36 AM  Serum Sodium: 135 mmol/L at 4/15/2025  5:36 AM  Total Bilirubin: 3.5 mg/dL at 4/15/2025  5:36 AM  Serum Albumin: 2 g/dL at 4/15/2025  5:36 AM  INR(ratio): 1.7 at 4/13/2025 12:42 PM  Age at listing (hypothetical): 69 years  Sex: Male at 4/15/2025  5:36 AM      Continue chronic meds. Etiology likely ETOH. Will avoid any hepatotoxic meds, and monitor CBC/CMP/INR for synthetic function.     4/16 FM:  Slowly improving.  4/18 FM:  Cont to improve.  4/19 FM:  Home soon.  4/20 FM:  Has declined, see above and changes.  4/21 FM: Has responded to change in meds.

## 2025-04-21 NOTE — ASSESSMENT & PLAN NOTE
Patient has persistent (7 days or more) atrial fibrillation. Patient is currently in atrial fibrillation. [id number] Score: 1. The patients heart rate in the last 24 hours is as follows:  Pulse  Min: 73  Max: 90     Antiarrhythmics  metoprolol tartrate (LOPRESSOR) tablet 50 mg, 2 times daily, Oral    Anticoagulants       Plan  - Replete lytes with a goal of K>4, Mg >2  - Patient is not anticoagulated due to thrombocytopenia   - Patient's afib is currently uncontrolled. Will adjust treatment as follows: adjust lopressor dose  - cardiology consulted    4/14 FM:  No anticoags 2nd CLD, Plt Count.  4/15 FM:  Cont. Txt plan.

## 2025-04-22 LAB
ABSOLUTE EOSINOPHIL (OHS): 0.19 K/UL
ABSOLUTE MONOCYTE (OHS): 0.94 K/UL (ref 0.3–1)
ABSOLUTE NEUTROPHIL COUNT (OHS): 2.94 K/UL (ref 1.8–7.7)
ALBUMIN SERPL BCP-MCNC: 1.9 G/DL (ref 3.5–5.2)
ALP SERPL-CCNC: 68 UNIT/L (ref 40–150)
ALT SERPL W/O P-5'-P-CCNC: 9 UNIT/L (ref 10–44)
ANION GAP (OHS): 9 MMOL/L (ref 8–16)
AST SERPL-CCNC: 28 UNIT/L (ref 11–45)
BASOPHILS # BLD AUTO: 0.05 K/UL
BASOPHILS NFR BLD AUTO: 1 %
BILIRUB SERPL-MCNC: 3.3 MG/DL (ref 0.1–1)
BUN SERPL-MCNC: 26 MG/DL (ref 8–23)
CALCIUM SERPL-MCNC: 8.7 MG/DL (ref 8.7–10.5)
CHLORIDE SERPL-SCNC: 90 MMOL/L (ref 95–110)
CO2 SERPL-SCNC: 34 MMOL/L (ref 23–29)
CREAT SERPL-MCNC: 2 MG/DL (ref 0.5–1.4)
ERYTHROCYTE [DISTWIDTH] IN BLOOD BY AUTOMATED COUNT: 15.9 % (ref 11.5–14.5)
GFR SERPLBLD CREATININE-BSD FMLA CKD-EPI: 35 ML/MIN/1.73/M2
GLUCOSE SERPL-MCNC: 91 MG/DL (ref 70–110)
HCT VFR BLD AUTO: 29.6 % (ref 40–54)
HGB BLD-MCNC: 9.8 GM/DL (ref 14–18)
IMM GRANULOCYTES # BLD AUTO: 0.02 K/UL (ref 0–0.04)
IMM GRANULOCYTES NFR BLD AUTO: 0.4 % (ref 0–0.5)
LYMPHOCYTES # BLD AUTO: 1.04 K/UL (ref 1–4.8)
MAGNESIUM SERPL-MCNC: 1.5 MG/DL (ref 1.6–2.6)
MCH RBC QN AUTO: 31.1 PG (ref 27–31)
MCHC RBC AUTO-ENTMCNC: 33.1 G/DL (ref 32–36)
MCV RBC AUTO: 94 FL (ref 82–98)
NUCLEATED RBC (/100WBC) (OHS): 0 /100 WBC
PLATELET # BLD AUTO: 79 K/UL (ref 150–450)
PMV BLD AUTO: 11.6 FL (ref 9.2–12.9)
POTASSIUM SERPL-SCNC: 3.2 MMOL/L (ref 3.5–5.1)
PROT SERPL-MCNC: 6.3 GM/DL (ref 6–8.4)
RBC # BLD AUTO: 3.15 M/UL (ref 4.6–6.2)
RELATIVE EOSINOPHIL (OHS): 3.7 %
RELATIVE LYMPHOCYTE (OHS): 20.1 % (ref 18–48)
RELATIVE MONOCYTE (OHS): 18.1 % (ref 4–15)
RELATIVE NEUTROPHIL (OHS): 56.7 % (ref 38–73)
SODIUM SERPL-SCNC: 133 MMOL/L (ref 136–145)
WBC # BLD AUTO: 5.18 K/UL (ref 3.9–12.7)

## 2025-04-22 PROCEDURE — 36415 COLL VENOUS BLD VENIPUNCTURE: CPT | Performed by: INTERNAL MEDICINE

## 2025-04-22 PROCEDURE — 63600175 PHARM REV CODE 636 W HCPCS: Performed by: INTERNAL MEDICINE

## 2025-04-22 PROCEDURE — 25000003 PHARM REV CODE 250: Performed by: EMERGENCY MEDICINE

## 2025-04-22 PROCEDURE — 80051 ELECTROLYTE PANEL: CPT | Performed by: INTERNAL MEDICINE

## 2025-04-22 PROCEDURE — 25000003 PHARM REV CODE 250: Performed by: INTERNAL MEDICINE

## 2025-04-22 PROCEDURE — 11000001 HC ACUTE MED/SURG PRIVATE ROOM

## 2025-04-22 PROCEDURE — 85025 COMPLETE CBC W/AUTO DIFF WBC: CPT | Performed by: INTERNAL MEDICINE

## 2025-04-22 PROCEDURE — 83735 ASSAY OF MAGNESIUM: CPT | Performed by: INTERNAL MEDICINE

## 2025-04-22 RX ORDER — MAGNESIUM CHLORIDE 64 MG
128 TABLET, DELAYED RELEASE (ENTERIC COATED) ORAL 2 TIMES DAILY
Status: DISCONTINUED | OUTPATIENT
Start: 2025-04-22 | End: 2025-04-24 | Stop reason: HOSPADM

## 2025-04-22 RX ADMIN — POTASSIUM BICARBONATE 20 MEQ: 391 TABLET, EFFERVESCENT ORAL at 09:04

## 2025-04-22 RX ADMIN — SPIRONOLACTONE 50 MG: 25 TABLET ORAL at 09:04

## 2025-04-22 RX ADMIN — FUROSEMIDE 80 MG: 10 INJECTION, SOLUTION INTRAVENOUS at 08:04

## 2025-04-22 RX ADMIN — FUROSEMIDE 80 MG: 10 INJECTION, SOLUTION INTRAVENOUS at 05:04

## 2025-04-22 RX ADMIN — FUROSEMIDE 80 MG: 10 INJECTION, SOLUTION INTRAVENOUS at 11:04

## 2025-04-22 RX ADMIN — TAMSULOSIN HYDROCHLORIDE 0.4 MG: 0.4 CAPSULE ORAL at 09:04

## 2025-04-22 RX ADMIN — MAGNESIUM 64 MG (MAGNESIUM CHLORIDE) TABLET,DELAYED RELEASE 128 MG: at 09:04

## 2025-04-22 RX ADMIN — METOPROLOL TARTRATE 50 MG: 50 TABLET, FILM COATED ORAL at 09:04

## 2025-04-22 RX ADMIN — LACTULOSE 20 G: 20 SOLUTION ORAL at 09:04

## 2025-04-22 RX ADMIN — Medication 100 MG: at 09:04

## 2025-04-22 RX ADMIN — RIFAXIMIN 550 MG: 550 TABLET ORAL at 09:04

## 2025-04-22 RX ADMIN — METOLAZONE 10 MG: 5 TABLET ORAL at 09:04

## 2025-04-22 RX ADMIN — FAMOTIDINE 20 MG: 20 TABLET, FILM COATED ORAL at 09:04

## 2025-04-22 RX ADMIN — FUROSEMIDE 80 MG: 10 INJECTION, SOLUTION INTRAVENOUS at 12:04

## 2025-04-22 NOTE — ASSESSMENT & PLAN NOTE
Creatine stable for now. BMP reviewed- noted Estimated Creatinine Clearance: 49 mL/min (A) (based on SCr of 2 mg/dL (H)). according to latest data. Based on current GFR, CKD stage is stage 4 - GFR 15-29.  Monitor UOP and serial BMP and adjust therapy as needed. Renally dose meds. Avoid nephrotoxic medications and procedures.    4/14 FM:  Will not DC tunneled cath just yet.  4/15 FM:  Patient is responding to lasix drip.  4/16 FM:  Slowly improving.  4/17 FM:  Improving with diuresis.  4/18 FM:  Cont to improve.  4/19 FM:  Home soon.  4/20 FM:  As above.  4/22 FM:  Labs noted, improved as we diuresis.

## 2025-04-22 NOTE — ASSESSMENT & PLAN NOTE
2/2 to cirrhosis, diuresis on board, replete electrolytes, monitor urine output    4/14 FM:  Stopping midrodine today, possible related to volume retention.  4/15 FM:  Slowly improving, need to establish lowest tolerable dry weight.  4/16 FM:  Slowly improving.  4/17 FM:  Cont to improve.  4/18 FM:  Cont to improve.  4/19 FM:  Home soon.  4/20 FM:  As above.  4/21 FM: Has responded to change in meds.   4/22 FM:  Much improved.

## 2025-04-22 NOTE — SUBJECTIVE & OBJECTIVE
"Past Medical History:   Diagnosis Date    Atrial fibrillation     Bulging disc     Cirrhosis     Colon polyp 12/29/2017    Rpt 5 yrs    Encounter for blood transfusion     EV (esophageal varices)     Hypertension 03/30/2023    Renal disorder     Skin cancer 03/2017    'NOSE"    Thrombocytopenia        Past Surgical History:   Procedure Laterality Date    CATHETERIZATION OF BOTH LEFT AND RIGHT HEART N/A 02/08/2023    Procedure: CATHETERIZATION, HEART, BOTH LEFT AND RIGHT;  Surgeon: Flo Malone MD;  Location: [facility] CATH LAB;  Service: Cardiology;  Laterality: N/A;  low bleeding risk 1.0%    CHOLECYSTECTOMY      COLONOSCOPY      COLONOSCOPY N/A 12/29/2017    ta rpt 2022    CORONARY ANGIOGRAPHY N/A 02/08/2023    Procedure: ANGIOGRAM, CORONARY ARTERY;  Surgeon: Flo Malone MD;  Location: [facility] CATH LAB;  Service: Cardiology;  Laterality: N/A;    HERNIA REPAIR      INCISION AND DRAINAGE Right 02/27/2025    Procedure: Incision and Drainage, thigh;  Surgeon: Kayla Foster MD;  Location: [facility];  Service: General;  Laterality: Right;  Plan to go first if pt has cardiac clearance - SB    SKIN BIOPSY  03/2017    TONSILLECTOMY      UPPER GASTROINTESTINAL ENDOSCOPY  2011    EV    UPPER GASTROINTESTINAL ENDOSCOPY  2016    VASECTOMY         Review of patient's allergies indicates:  No Known Allergies    No current facility-administered medications on file prior to encounter.     Current Outpatient Medications on File Prior to Encounter   Medication Sig    famotidine (PEPCID) 20 MG tablet Take 1 tablet (20 mg total) by mouth once daily.    furosemide (LASIX) 80 MG tablet Take 1 tablet (80 mg total) by mouth 2 (two) times a day for 3 days, THEN 1 tablet (80 mg total) once daily.    lactulose (CHRONULAC) 20 gram/30 mL Soln Take 45 mLs (30 g total) by mouth 3 (three) times daily.    magnesium chloride (MAG 64) 64 mg TbEC Take 2 tablets (128 mg total) by mouth once daily.    metoprolol tartrate (LOPRESSOR) 25 MG tablet " TAKE 0.5 TABLETS BY MOUTH 2 TIMES DAILY.    midodrine (PROAMATINE) 5 MG Tab Take 3 tablets (15 mg total) by mouth 3 (three) times daily with meals.    potassium bicarbonate (K-LYTE) disintegrating tablet Take 1 tablet (25 mEq total) by mouth 2 (two) times a day.    spironolactone (ALDACTONE) 25 MG tablet Take 1 tablet (25 mg total) by mouth 2 (two) times daily.    tamsulosin (FLOMAX) 0.4 mg Cap Take 1 capsule (0.4 mg total) by mouth once daily.    thiamine 100 MG tablet Take 100 mg by mouth once daily.    apixaban (ELIQUIS) 5 mg Tab Take 5 mg by mouth 2 (two) times daily.     Family History       Problem Relation (Age of Onset)    Alzheimer's disease Mother    Cancer Maternal Uncle    Heart disease Maternal Uncle    Hyperlipidemia Brother    No Known Problems Father    Ovarian cancer Sister          Tobacco Use    Smoking status: Never     Passive exposure: Never    Smokeless tobacco: Never   Substance and Sexual Activity    Alcohol use: Not Currently     Alcohol/week: 0.0 standard drinks of alcohol    Drug use: No    Sexual activity: Not Currently     Review of Systems   Constitutional:  Positive for fatigue. Negative for chills and fever.   HENT:  Negative for congestion.    Eyes:  Negative for visual disturbance.   Respiratory:  Positive for shortness of breath. Negative for wheezing.    Cardiovascular:  Positive for leg swelling. Negative for chest pain and palpitations.   Gastrointestinal:  Positive for abdominal distention. Negative for abdominal pain, constipation, diarrhea, nausea and vomiting.   Endocrine: Negative for polyuria.   Genitourinary:  Positive for scrotal swelling (Scrotal edema). Negative for dysuria.   Musculoskeletal:  Positive for arthralgias. Negative for back pain and gait problem.   Skin:  Negative for wound.   Neurological:  Positive for weakness.   Psychiatric/Behavioral:  The patient is not nervous/anxious.      Objective:     Vital Signs (Most Recent):  Temp: 97.8 °F (36.6 °C)  (04/22/25 0704)  Pulse: 78 (04/22/25 0704)  Resp: 16 (04/22/25 0704)  BP: (!) 103/58 (04/22/25 0704)  SpO2: 98 % (04/22/25 0704) Vital Signs (24h Range):  Temp:  [97.6 °F (36.4 °C)-98.2 °F (36.8 °C)] 97.8 °F (36.6 °C)  Pulse:  [71-87] 78  Resp:  [16-20] 16  SpO2:  [95 %-100 %] 98 %  BP: ()/(53-64) 103/58     Weight: 136 kg (299 lb 12.8 oz)  Body mass index is 39.55 kg/m².     Physical Exam  Vitals and nursing note reviewed.   Constitutional:       General: He is not in acute distress.     Appearance: He is well-developed. He is obese. He is ill-appearing. He is not diaphoretic.   HENT:      Head: Normocephalic and atraumatic.      Nose: No congestion or rhinorrhea.   Eyes:      General: Scleral icterus present.         Right eye: No discharge.         Left eye: No discharge.      Extraocular Movements: Extraocular movements intact.   Neck:      Thyroid: No thyromegaly.      Vascular: No JVD.   Cardiovascular:      Rate and Rhythm: Normal rate. Rhythm irregular.      Heart sounds: Normal heart sounds. No murmur heard.     No friction rub. No gallop.   Pulmonary:      Effort: No respiratory distress.      Breath sounds: No wheezing, rhonchi or rales.   Abdominal:      General: There is distension.      Tenderness: There is no abdominal tenderness. There is no guarding or rebound.      Hernia: No hernia is present.   Genitourinary:     Comments: Scrotal edema ++ improved  Musculoskeletal:      Cervical back: Neck supple.      Right lower leg: Edema present.      Left lower leg: Edema present.   Neurological:      Mental Status: He is alert and oriented to person, place, and time. Mental status is at baseline.      Motor: Weakness present.   Psychiatric:         Behavior: Behavior normal.         Thought Content: Thought content normal.                Significant Labs: All pertinent labs within the past 24 hours have been reviewed.  Recent Lab Results         04/22/25  0645        Albumin 1.9       ALP 68        ALT 9       Anion Gap 9       AST 28       Baso # 0.05       Basophil % 1.0       BILIRUBIN TOTAL 3.3  Comment: For infants and newborns, interpretation of results should be based   on gestational age, weight and in agreement with clinical   observations.    Premature Infant recommended reference ranges:   0-24 hours:  <8.0 mg/dL   24-48 hours: <12.0 mg/dL   3-5 days:    <15.0 mg/dL   6-29 days:   <15.0 mg/dL       BUN 26       Calcium 8.7       Chloride 90       CO2 34       Creatinine 2.0       eGFR 35  Comment: Estimated GFR calculated using the CKD-EPI creatinine (2021) equation.       Eos # 0.19       Eos % 3.7       Glucose 91       Gran # (ANC) 2.94       Hematocrit 29.6       Hemoglobin 9.8       Immature Grans (Abs) 0.02  Comment: Mild elevation in immature granulocytes is non specific and can be seen in a variety of conditions including stress response, acute inflammation, trauma and pregnancy. Correlation with other laboratory and clinical findings is essential.       Immature Granulocytes 0.4       Lymph # 1.04       Lymph % 20.1       Magnesium  1.5       MCH 31.1       MCHC 33.1       MCV 94       Mono # 0.94       Mono % 18.1       MPV 11.6       Neut % 56.7       nRBC 0       Platelet Count 79       Potassium 3.2       PROTEIN TOTAL 6.3       RBC 3.15       RDW 15.9       Sodium 133       WBC 5.18               Significant Imaging: I have reviewed all pertinent imaging results/findings within the past 24 hours.

## 2025-04-22 NOTE — PROGRESS NOTES
[facility] Medicine  Progress Note    Patient Name: Juan Carlos Yoo Sr.  MRN: [id number]  Patient Class: IP- Inpatient   Admission Date: 4/12/2025  Length of Stay: 8 days  Attending Physician: Joao Villegas III, MD  Primary Care Provider: Joao Villegas III, MD        Subjective     Principal Problem:Decompensated cirrhosis        HPI:  Patient 69-year-old male history of AFib, cirrhosis, hypertension came to the ED with complaints of increased swelling to testicular region, lower extremity, patient had been seen previously for same issue, patient is on Lasix and Aldactone, increased pain due to swelling, associated orthopnea and dyspnea on exertion reported no nausea vomiting reported, patient denied any fever chills    Overview/Hospital Course:  4/14 FM:  Patient known to me, patient presented to the emergency department with an acute 6 lb weight gain severe painful scrotal edema and dyspnea.  Patient is on an IV Lasix drip with some response.  Scrotum is still massive today.  Patient is not taking anticoagulation because of his thrombocytopenia and cirrhosis cardiology has been consulted patient also had a planned surgical removal of his tunneled dialysis catheter in the right chest for this week which may be on hold depending on how he responds to treatment this admission.  I have contacted surgery.  4/15 FM:  Patient is responding to the Lasix drip and had decent urine output, patient's scrotal edema has reduced about 30%.  Patient's electrolytes noted we will begin replacing potassium and magnesium as we diurese patient's midodrine was discontinued and I have explained to him that he will likely have chronic hypotension and once we get to a adequate volume level we will see how he tolerates his blood pressures.  We will likely require a prolonged hospitalization.  4/16 FM:  Patient is diuresing slowly, patient's weight has not changed much on measurement but question accuracy.  Patient's  scrotal edema is improved some but still persist.  Patient's creatinine is down on IV Lasix drip, we will consult patient's nephrologist to follow along.  Leaving Vas-Cath in place for now.  Patient's getting out of bed and working with therapy which I have continued to encourage.  Midodrine is on hold which I do suspect was contributing to volume retention.  4/17 FM:  Patient's diuresing well and has had a significant improvement in the last 24 hours.  Patient's electrolytes noted continue repletion continue hospitalization and IV Lasix drip for another 24 hours.  We will likely transition to p.o. in a.m..  Mental status seems to be improved keep working with therapy.  4/18 FM:  Patient is continuing to diurese scrotal edema almost resolved patient's still has quite a bit of volume left to attempt remove blood pressure improved creatinine improved.  Continue to press forward with current treatment.  4/19 FM:  Patient has now been on a Lasix drip for the last 6 days, we will transitioned to pulse dosing and then p.o..  Patient has lost significant edema and fluid weight but still has a ways to go.  Can likely transition to p.o. and continue this at home.  4/20 FM:  Patient's diuresis has halted and he actually gained a 1 lb overnight.  I have reached out to the patient's nephrologist and she is adding metolazone daily and will ultrafiltrate if no progress tomorrow.  4/21 FM:  Patient had significant diuresis through the night with the addition of metolazone, patient's -3800 mL.  Patient's edema improved his weight is down we will continue with current medications renal assisting and following.  4/22 FM:  Patient diuresed another 3100 mL overnight.  Significant difference in exam today.  Continue current care plan and watch electrolytes and renal function.    Past Medical History:   Diagnosis Date    Atrial fibrillation     Bulging disc     Cirrhosis     Colon polyp 12/29/2017    Rpt 5 yrs    Encounter for blood  "transfusion     EV (esophageal varices)     Hypertension 03/30/2023    Renal disorder     Skin cancer 03/2017    'NOSE"    Thrombocytopenia        Past Surgical History:   Procedure Laterality Date    CATHETERIZATION OF BOTH LEFT AND RIGHT HEART N/A 02/08/2023    Procedure: CATHETERIZATION, HEART, BOTH LEFT AND RIGHT;  Surgeon: Flo Malone MD;  Location: [facility] CATH LAB;  Service: Cardiology;  Laterality: N/A;  low bleeding risk 1.0%    CHOLECYSTECTOMY      COLONOSCOPY      COLONOSCOPY N/A 12/29/2017    ta rpt 2022    CORONARY ANGIOGRAPHY N/A 02/08/2023    Procedure: ANGIOGRAM, CORONARY ARTERY;  Surgeon: Flo Malone MD;  Location: [facility] CATH LAB;  Service: Cardiology;  Laterality: N/A;    HERNIA REPAIR      INCISION AND DRAINAGE Right 02/27/2025    Procedure: Incision and Drainage, thigh;  Surgeon: Kayla Foster MD;  Location: [facility] OR;  Service: General;  Laterality: Right;  Plan to go first if pt has cardiac clearance - SB    SKIN BIOPSY  03/2017    TONSILLECTOMY      UPPER GASTROINTESTINAL ENDOSCOPY  2011    EV    UPPER GASTROINTESTINAL ENDOSCOPY  2016    VASECTOMY         Review of patient's allergies indicates:  No Known Allergies    No current facility-administered medications on file prior to encounter.     Current Outpatient Medications on File Prior to Encounter   Medication Sig    famotidine (PEPCID) 20 MG tablet Take 1 tablet (20 mg total) by mouth once daily.    furosemide (LASIX) 80 MG tablet Take 1 tablet (80 mg total) by mouth 2 (two) times a day for 3 days, THEN 1 tablet (80 mg total) once daily.    lactulose (CHRONULAC) 20 gram/30 mL Soln Take 45 mLs (30 g total) by mouth 3 (three) times daily.    magnesium chloride (MAG 64) 64 mg TbEC Take 2 tablets (128 mg total) by mouth once daily.    metoprolol tartrate (LOPRESSOR) 25 MG tablet TAKE 0.5 TABLETS BY MOUTH 2 TIMES DAILY.    midodrine (PROAMATINE) 5 MG Tab Take 3 tablets (15 mg total) by mouth 3 (three) times daily with meals.    " potassium bicarbonate (K-LYTE) disintegrating tablet Take 1 tablet (25 mEq total) by mouth 2 (two) times a day.    spironolactone (ALDACTONE) 25 MG tablet Take 1 tablet (25 mg total) by mouth 2 (two) times daily.    tamsulosin (FLOMAX) 0.4 mg Cap Take 1 capsule (0.4 mg total) by mouth once daily.    thiamine 100 MG tablet Take 100 mg by mouth once daily.    apixaban (ELIQUIS) 5 mg Tab Take 5 mg by mouth 2 (two) times daily.     Family History       Problem Relation (Age of Onset)    Alzheimer's disease Mother    Cancer Maternal Uncle    Heart disease Maternal Uncle    Hyperlipidemia Brother    No Known Problems Father    Ovarian cancer Sister          Tobacco Use    Smoking status: Never     Passive exposure: Never    Smokeless tobacco: Never   Substance and Sexual Activity    Alcohol use: Not Currently     Alcohol/week: 0.0 standard drinks of alcohol    Drug use: No    Sexual activity: Not Currently     Review of Systems   Constitutional:  Positive for fatigue. Negative for chills and fever.   HENT:  Negative for congestion.    Eyes:  Negative for visual disturbance.   Respiratory:  Positive for shortness of breath. Negative for wheezing.    Cardiovascular:  Positive for leg swelling. Negative for chest pain and palpitations.   Gastrointestinal:  Positive for abdominal distention. Negative for abdominal pain, constipation, diarrhea, nausea and vomiting.   Endocrine: Negative for polyuria.   Genitourinary:  Positive for scrotal swelling (Scrotal edema). Negative for dysuria.   Musculoskeletal:  Positive for arthralgias. Negative for back pain and gait problem.   Skin:  Negative for wound.   Neurological:  Positive for weakness.   Psychiatric/Behavioral:  The patient is not nervous/anxious.      Objective:     Vital Signs (Most Recent):  Temp: 97.8 °F (36.6 °C) (04/22/25 0704)  Pulse: 78 (04/22/25 0704)  Resp: 16 (04/22/25 0704)  BP: (!) 103/58 (04/22/25 0704)  SpO2: 98 % (04/22/25 0704) Vital Signs (24h  Range):  Temp:  [97.6 °F (36.4 °C)-98.2 °F (36.8 °C)] 97.8 °F (36.6 °C)  Pulse:  [71-87] 78  Resp:  [16-20] 16  SpO2:  [95 %-100 %] 98 %  BP: ()/(53-64) 103/58     Weight: 136 kg (299 lb 12.8 oz)  Body mass index is 39.55 kg/m².     Physical Exam  Vitals and nursing note reviewed.   Constitutional:       General: He is not in acute distress.     Appearance: He is well-developed. He is obese. He is ill-appearing. He is not diaphoretic.   HENT:      Head: Normocephalic and atraumatic.      Nose: No congestion or rhinorrhea.   Eyes:      General: Scleral icterus present.         Right eye: No discharge.         Left eye: No discharge.      Extraocular Movements: Extraocular movements intact.   Neck:      Thyroid: No thyromegaly.      Vascular: No JVD.   Cardiovascular:      Rate and Rhythm: Normal rate. Rhythm irregular.      Heart sounds: Normal heart sounds. No murmur heard.     No friction rub. No gallop.   Pulmonary:      Effort: No respiratory distress.      Breath sounds: No wheezing, rhonchi or rales.   Abdominal:      General: There is distension.      Tenderness: There is no abdominal tenderness. There is no guarding or rebound.      Hernia: No hernia is present.   Genitourinary:     Comments: Scrotal edema ++ improved  Musculoskeletal:      Cervical back: Neck supple.      Right lower leg: Edema present.      Left lower leg: Edema present.   Neurological:      Mental Status: He is alert and oriented to person, place, and time. Mental status is at baseline.      Motor: Weakness present.   Psychiatric:         Behavior: Behavior normal.         Thought Content: Thought content normal.                Significant Labs: All pertinent labs within the past 24 hours have been reviewed.  Recent Lab Results         04/22/25  0645        Albumin 1.9       ALP 68       ALT 9       Anion Gap 9       AST 28       Baso # 0.05       Basophil % 1.0       BILIRUBIN TOTAL 3.3  Comment: For infants and newborns,  interpretation of results should be based   on gestational age, weight and in agreement with clinical   observations.    Premature Infant recommended reference ranges:   0-24 hours:  <8.0 mg/dL   24-48 hours: <12.0 mg/dL   3-5 days:    <15.0 mg/dL   6-29 days:   <15.0 mg/dL       BUN 26       Calcium 8.7       Chloride 90       CO2 34       Creatinine 2.0       eGFR 35  Comment: Estimated GFR calculated using the CKD-EPI creatinine (2021) equation.       Eos # 0.19       Eos % 3.7       Glucose 91       Gran # (ANC) 2.94       Hematocrit 29.6       Hemoglobin 9.8       Immature Grans (Abs) 0.02  Comment: Mild elevation in immature granulocytes is non specific and can be seen in a variety of conditions including stress response, acute inflammation, trauma and pregnancy. Correlation with other laboratory and clinical findings is essential.       Immature Granulocytes 0.4       Lymph # 1.04       Lymph % 20.1       Magnesium  1.5       MCH 31.1       MCHC 33.1       MCV 94       Mono # 0.94       Mono % 18.1       MPV 11.6       Neut % 56.7       nRBC 0       Platelet Count 79       Potassium 3.2       PROTEIN TOTAL 6.3       RBC 3.15       RDW 15.9       Sodium 133       WBC 5.18               Significant Imaging: I have reviewed all pertinent imaging results/findings within the past 24 hours.      Assessment & Plan  Decompensated cirrhosis  Patient with known Cirrhosis with Child's class Calculator for Child's score link- https://www.mdcalc.com/calc/340/child-ocampo-score-cirrhosis-mortality#creator-insights. Co-morbidities are present and inclusive of esophageal varices, hepatic encephalopathy, and anemia/pancytopenia.  MELD-Na score calculated; MELD 3.0: 27 at 4/15/2025  5:36 AM  MELD-Na: 26 at 4/15/2025  5:36 AM  Calculated from:  Serum Creatinine: 2.2 mg/dL at 4/15/2025  5:36 AM  Serum Sodium: 135 mmol/L at 4/15/2025  5:36 AM  Total Bilirubin: 3.5 mg/dL at 4/15/2025  5:36 AM  Serum Albumin: 2 g/dL at 4/15/2025   5:36 AM  INR(ratio): 1.7 at 4/13/2025 12:42 PM  Age at listing (hypothetical): 69 years  Sex: Male at 4/15/2025  5:36 AM      Continue chronic meds. Etiology likely ETOH. Will avoid any hepatotoxic meds, and monitor CBC/CMP/INR for synthetic function.     4/16 FM:  Slowly improving.  4/18 FM:  Cont to improve.  4/19 FM:  Home soon.  4/20 FM:  Has declined, see above and changes.  4/21 FM: Has responded to change in meds.   Cirrhosis  Patient with known Cirrhosis with Child's class Calculator for Child's score link- https://www.[employer]/calc/340/child-ocampo-score-cirrhosis-mortality#creator-insights. Co-morbidities are present and inclusive of ascites and anemia/pancytopenia.  MELD-Na score calculated; MELD 3.0: 27 at 4/15/2025  5:36 AM  MELD-Na: 26 at 4/15/2025  5:36 AM  Calculated from:  Serum Creatinine: 2.2 mg/dL at 4/15/2025  5:36 AM  Serum Sodium: 135 mmol/L at 4/15/2025  5:36 AM  Total Bilirubin: 3.5 mg/dL at 4/15/2025  5:36 AM  Serum Albumin: 2 g/dL at 4/15/2025  5:36 AM  INR(ratio): 1.7 at 4/13/2025 12:42 PM  Age at listing (hypothetical): 69 years  Sex: Male at 4/15/2025  5:36 AM      Continue chronic meds. Etiology likely ETOH. Will avoid any hepatotoxic meds, and monitor CBC/CMP/INR for synthetic function.   Patient reported     4/15 FM:  DC above, stopping midrodine.  Longstanding persistent atrial fibrillation  Patient has persistent (7 days or more) atrial fibrillation. Patient is currently in atrial fibrillation. [id number] Score: 1. The patients heart rate in the last 24 hours is as follows:  Pulse  Min: 71  Max: 87     Antiarrhythmics  metoprolol tartrate (LOPRESSOR) tablet 50 mg, 2 times daily, Oral    Anticoagulants       Plan  - Replete lytes with a goal of K>4, Mg >2  - Patient is not anticoagulated due to thrombocytopenia   - Patient's afib is currently uncontrolled. Will adjust treatment as follows: adjust lopressor dose  - cardiology consulted    4/14 FM:  No anticoags 2nd CLD, Plt  Count.  4/15 FM:  Cont. Txt plan.  Thrombocytopenia  The likely etiology of thrombocytopenia is liver disease. The patients 3 most recent labs are listed below.  Recent Labs     04/20/25  0655 04/21/25  0601 04/22/25  0645   PLT 70* 67* 79*     Plan  - Will transfuse if platelet count is <20k.    Scrotal edema  4/14 FM:  Not much change overnight, cont IV lasix drip.  4/15 FM:  Improving slowly, echo noted.  4/16 FM:  Slowly improving.  4/17 FM:  Much improved today.  4/18 FM:  Cont to improve.  4/19 FM:  Transitioning to pulse dosing of lasix.  4/20 FM:  Digressed, addding metolazone, may need UF.  4/21 FM:  Has responded to change in meds.  4/22 FM:  Much improved.    Anasarca  2/2 to cirrhosis, diuresis on board, replete electrolytes, monitor urine output    4/14 FM:  Stopping midrodine today, possible related to volume retention.  4/15 FM:  Slowly improving, need to establish lowest tolerable dry weight.  4/16 FM:  Slowly improving.  4/17 FM:  Cont to improve.  4/18 FM:  Cont to improve.  4/19 FM:  Home soon.  4/20 FM:  As above.  4/21 FM: Has responded to change in meds.   4/22 FM:  Much improved.  CKD (chronic kidney disease) stage 4, GFR 15-29 ml/min  Creatine stable for now. BMP reviewed- noted Estimated Creatinine Clearance: 49 mL/min (A) (based on SCr of 2 mg/dL (H)). according to latest data. Based on current GFR, CKD stage is stage 4 - GFR 15-29.  Monitor UOP and serial BMP and adjust therapy as needed. Renally dose meds. Avoid nephrotoxic medications and procedures.    4/14 FM:  Will not DC tunneled cath just yet.  4/15 FM:  Patient is responding to lasix drip.  4/16 FM:  Slowly improving.  4/17 FM:  Improving with diuresis.  4/18 FM:  Cont to improve.  4/19 FM:  Home soon.  4/20 FM:  As above.  4/22 FM:  Labs noted, improved as we diuresis.  Hepatic encephalopathy  4/14 FM:  Stable.  4/15 FM:  Cont current lactulose, adding xifaxin.  4/18 FM:  Cont to improve.  4/22 FM:  Much improved.    VTE Risk  Mitigation (From admission, onward)           Ordered     IP VTE HIGH RISK PATIENT  Once         04/12/25 2327     Place sequential compression device  Until discontinued         04/12/25 2327     Place ANDREW hose  Until discontinued         04/12/25 2327                    Discharge Planning   AUTUMN:      Code Status: Full Code   Medical Readiness for Discharge Date:   Discharge Plan A: Home with family, Home Health                        Joao Villegas III, MD  Department of Hospital Medicine   [facility]

## 2025-04-22 NOTE — ASSESSMENT & PLAN NOTE
Patient with known Cirrhosis with Child's class Calculator for Child's score link- https://www.[employer].com/calc/340/child-ocampo-score-cirrhosis-mortality#creator-insights. Co-morbidities are present and inclusive of esophageal varices, hepatic encephalopathy, and anemia/pancytopenia.  MELD-Na score calculated; MELD 3.0: 27 at 4/15/2025  5:36 AM  MELD-Na: 26 at 4/15/2025  5:36 AM  Calculated from:  Serum Creatinine: 2.2 mg/dL at 4/15/2025  5:36 AM  Serum Sodium: 135 mmol/L at 4/15/2025  5:36 AM  Total Bilirubin: 3.5 mg/dL at 4/15/2025  5:36 AM  Serum Albumin: 2 g/dL at 4/15/2025  5:36 AM  INR(ratio): 1.7 at 4/13/2025 12:42 PM  Age at listing (hypothetical): 69 years  Sex: Male at 4/15/2025  5:36 AM      Continue chronic meds. Etiology likely ETOH. Will avoid any hepatotoxic meds, and monitor CBC/CMP/INR for synthetic function.     4/16 FM:  Slowly improving.  4/18 FM:  Cont to improve.  4/19 FM:  Home soon.  4/20 FM:  Has declined, see above and changes.  4/21 FM: Has responded to change in meds.

## 2025-04-22 NOTE — ASSESSMENT & PLAN NOTE
Patient with known Cirrhosis with Child's class Calculator for Child's score link- https://www.[employer].com/calc/340/child-ocampo-score-cirrhosis-mortality#creator-insights. Co-morbidities are present and inclusive of ascites and anemia/pancytopenia.  MELD-Na score calculated; MELD 3.0: 27 at 4/15/2025  5:36 AM  MELD-Na: 26 at 4/15/2025  5:36 AM  Calculated from:  Serum Creatinine: 2.2 mg/dL at 4/15/2025  5:36 AM  Serum Sodium: 135 mmol/L at 4/15/2025  5:36 AM  Total Bilirubin: 3.5 mg/dL at 4/15/2025  5:36 AM  Serum Albumin: 2 g/dL at 4/15/2025  5:36 AM  INR(ratio): 1.7 at 4/13/2025 12:42 PM  Age at listing (hypothetical): 69 years  Sex: Male at 4/15/2025  5:36 AM      Continue chronic meds. Etiology likely ETOH. Will avoid any hepatotoxic meds, and monitor CBC/CMP/INR for synthetic function.   Patient reported     4/15 FM:  DC above, stopping midrodine.

## 2025-04-22 NOTE — PROGRESS NOTES
[facility]  Cardiology  Progress Note    Patient Name: Juan Carlos Yoo Sr.  MRN: [id number]  Admission Date: 4/12/2025  Hospital Length of Stay: 9 days  Code Status: Full Code   Attending Physician: Joao Villegas III, MD   Primary Care Physician: Joao Villegas III, MD  Expected Discharge Date:   Principal Problem:Decompensated cirrhosis    Subjective:     Hospital Course:       Patient is a 68 yo male with a history of chronic atrial fibrillation, chronic diastolic heart failure, cirrhosis, and prior need for dialysis presenting with worsening bilateral lower extremity edema and scrotal swelling. Patient reports waking up recently with increased swelling that progressively worsened, particularly affecting his genitals, causing significant discomfort with ambulation. He presented to the ED yesterday and was admitted. He reports compliance with his medications including Lasix and spironolactone, though occasionally taking doses 1-2 hours late. He endorses mild intermittent exertional dyspnea, notably while walking to his truck, but denies orthopnea. Denies fever. Patient has a dialysis port scheduled for removal this Wednesday, with pre-op clearance obtained from cardiology last Friday. Recent echocardiogram in December showed preserved cardiac function with known atrial fibrillation. EKG from the 21st confirmed chronic A-fib. Patient denies palpitations or awareness of irregular rhythm. 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 Denies history of smoking or illicit drug use. No prior cardiac stents.    Interval History:     4/14/25: Patient reports improved shortness of breath. He reports stable scrotal and bilateral lower extremity edema. Telemetry this morning shows atrial fibrillation with a controlled rate. -156 mL urine output since yesterday. Hemodynamics well controlled overnight. Electrolytes stable this morning.  Renal function worsening since admission.     4/15/25:  Patient reports improved scrotal edema and bilateral lower extremity edema. He denies shortness of breath and chest pain. Telemetry this morning shows atrial fibrillation with a heart rate in the 80s- 90s. -2450 mL urine output recorded since yesterday. Hemodynamics well controlled overnight. Electrolytes stable. Renal function continuing to worsen with a creatinine of 2.2 and GFR of 31. Liver function stable.     4/16/25: Patient reports improved scrotal and bilateral lower extremity edema. He denies chest pain and shortness of breath. Telemetry this morning shows atrial fibrillation with a heart rate varying from 90s- 120s. -1900 mL urine output since yesterday. Hypotension noted this morning with a blood pressure of 99/50. Improving kidney function.      4/17/25: Patient continues to report improved scrotal and bilateral lower extremity edema. He denies chest pain and shortness of breath. Telemetry this morning shows atrial fibrillation with a rate in the 90s. -2500 mL urine output since yesterday. Hypotension noted overnight with blood pressure in the 90s/ 50s. Kidney function continuing to improve.      4/21/25: Lasix drip was transitioned to 80 mg IV BID over the weekend. Metolazone 10 mg daily added to patient's regimen by nephrologist. This morning, patient reports improved scrotal and BLE edema. Hemodynamics well controlled overnight. -3,000 mL urine output overnight. Liver function stable this morning.     4/22/25: Patient continues to report improving scrotal and bilateral lower extremity edema. -3100 mL urine output since yesterday on 80 mg IV furosemide q8h and metolazone 10 mg daily. Hypokalmia and hypomagnesemia noted this morning; otherwise, electrolytes stable. Liver function stable as well.     Review of Systems   Constitutional: Negative for chills and fever.   HENT: Negative.     Cardiovascular:  Positive for leg swelling. Negative for chest  pain and palpitations.   Respiratory:  Positive for shortness of breath. Negative for cough and wheezing.    Gastrointestinal:  Positive for bloating. Negative for abdominal pain, nausea and vomiting.   Genitourinary:         Positive for scrotal edema     Objective:     Vital Signs (Most Recent):  Temp: 98 °F (36.7 °C) (04/23/25 1113)  Pulse: 89 (04/23/25 1113)  Resp: 18 (04/23/25 1113)  BP: (!) 111/56 (04/23/25 1113)  SpO2: 96 % (04/23/25 1113) Vital Signs (24h Range):  Temp:  [97.8 °F (36.6 °C)-98.4 °F (36.9 °C)] 98 °F (36.7 °C)  Pulse:  [77-91] 89  Resp:  [16-18] 18  SpO2:  [91 %-99 %] 96 %  BP: ()/(51-63) 111/56     Weight: 126 kg (277 lb 12.8 oz)  Body mass index is 36.65 kg/m².    SpO2: 96 %         Intake/Output Summary (Last 24 hours) at 4/23/2025 1544  Last data filed at 4/23/2025 0715  Gross per 24 hour   Intake 240 ml   Output 4150 ml   Net -3910 ml       Lines/Drains/Airways       Central Venous Catheter Line  Duration                  Hemodialysis Catheter 12/16/24 1152 right internal jugular 128 days              Drain  Duration                  Open Drain 02/27/25 0815 Tube - 1 Right Thigh Penrose 1/4 inch 55 days              Peripheral Intravenous Line  Duration                  Peripheral IV - Single Lumen 04/20/25 0030 20 G Anterior;Left Forearm 3 days                    Physical Exam  Constitutional:       General: He is not in acute distress.     Appearance: He is obese.   HENT:      Head: Normocephalic and atraumatic.   Eyes:      Extraocular Movements: Extraocular movements intact.   Cardiovascular:      Pulses: Normal pulses.      Heart sounds: Normal heart sounds.   Pulmonary:      Effort: Pulmonary effort is normal.      Breath sounds: Normal breath sounds.   Abdominal:      General: There is distension.   Musculoskeletal:      Cervical back: Normal range of motion and neck supple.      Right lower leg: Edema present.      Left lower leg: Edema present.   Skin:     General: Skin is  "warm and dry.      Capillary Refill: Capillary refill takes less than 2 seconds.   Neurological:      Mental Status: He is alert. Mental status is at baseline.   Psychiatric:         Mood and Affect: Mood normal.         Behavior: Behavior normal.         Significant Labs: BMP:   Recent Labs   Lab 04/22/25  0645 04/23/25  0546   * 134*   K 3.2* 3.7   CL 90* 88*   CO2 34* 38*   BUN 26* 28*   CREATININE 2.0* 1.9*   CALCIUM 8.7 8.8   MG 1.5* 1.4*   , CMP   Recent Labs   Lab 04/22/25  0645 04/23/25  0546   * 134*   K 3.2* 3.7   CL 90* 88*   CO2 34* 38*   BUN 26* 28*   CREATININE 2.0* 1.9*   CALCIUM 8.7 8.8   ALBUMIN 1.9* 1.9*   BILITOT 3.3* 3.0*   ALKPHOS 68 73   AST 28 28   ALT 9* 8*   ANIONGAP 9 8   , CBC   Recent Labs   Lab 04/22/25  0645 04/23/25  0546   WBC 5.18 5.44   HGB 9.8* 9.9*   HCT 29.6* 29.9*   PLT 79* 79*   , Lipid Panel No results for input(s): "CHOL", "HDL", "LDLCALC", "TRIG", "CHOLHDL" in the last 48 hours., Troponin No results for input(s): "TROPONINIHS" in the last 48 hours., and All pertinent lab results from the last 24 hours have been reviewed.    Significant Imaging:     Echocardiogram: Transthoracic echo (TTE) complete (Cupid Only):   Results for orders placed or performed during the hospital encounter of 04/12/25   Echo   Result Value Ref Range    BSA 2.65 m2    LVOT stroke volume 60.9 cm3    LVIDd 5.3 3.5 - 6.0 cm    LV Systolic Volume 58 mL    LV Systolic Volume Index 22.7 mL/m2    LVIDs 3.7 2.1 - 4.0 cm    LV Diastolic Volume 135 mL    LV Diastolic Volume Index 52.73 mL/m2    Left Ventricular End Systolic Volume by Teichholz Method 57.57 mL    Left Ventricular End Diastolic Volume by Teichholz Method 134.91 mL    IVS 1.1 0.6 - 1.1 cm    LVOT diameter 2.1 cm    LVOT area 3.5 cm2    FS 30.2 28 - 44 %    Left Ventricle Relative Wall Thickness 0.34 cm    PW 0.9 0.6 - 1.1 cm    LV mass 200.4 g    LV Mass Index 78.3 g/m2    MV Peak E Owen 1.42 m/s    TDI LATERAL 0.11 m/s    TDI SEPTAL " 0.13 m/s    E/E' ratio 12 m/s    TR Max Owen 3.2 m/s    E wave deceleration time 208 msec    LV SEPTAL E/E' RATIO 10.9 m/s    LV LATERAL E/E' RATIO 12.9 m/s    LVOT peak owen 0.8 m/s    Left Ventricular Outflow Tract Mean Velocity 0.57 cm/s    Left Ventricular Outflow Tract Mean Gradient 1.46 mmHg    RV- wilson basal diam 3.6 cm    RV/LV Ratio 0.68 cm    LA size 6.0 cm    Left Atrium Major Axis 7.3 cm    RA Major Axis 6.50 cm    AV mean gradient 4 mmHg    AV peak gradient 7 mmHg    Ao peak owen 1.3 m/s    Ao VTI 27.5 cm    LVOT peak VTI 17.6 cm    AV valve area 2.2 cm²    AV Velocity Ratio 0.62     AV index (prosthetic) 0.64     TONY by Velocity Ratio 2.1 cm²    Mr max owen 4.58 m/s    MV stenosis pressure 1/2 time 60.45 ms    MV valve area p 1/2 method 3.64 cm2    Triscuspid Valve Regurgitation Peak Gradient 41 mmHg    RVOT peak owen 0.58 m/s    Ao root annulus 3.39 cm    IVC diameter 3.29 cm    Mean e' 0.12 m/s    ZLVIDS -7.60     ZLVIDD -11.30     RVDD 3.60 cm    AORTIC VALVE CUSP SEPERATION 1.15 cm    TAPSE 1.80 cm    TV resting pulmonary artery pressure 56 mmHg    RV TB RVSP 18 mmHg    Est. RA pres 15 mmHg    Narrative      Left Ventricle: The left ventricle is normal in size. Mildly increased   wall thickness. There is normal systolic function with a visually   estimated ejection fraction of 55 - 60%. Unable to assess diastolic   function due to atrial fibrillation.    Right Ventricle: The right ventricle has moderate enlargement. Systolic   function is normal.    Left Atrium: Severely dilated    Right Atrium: Right atrium is moderately dilated.    Aortic Valve: The aortic valve is a trileaflet valve. There is mild   aortic valve sclerosis.    Mitral Valve: There is mild regurgitation.    Tricuspid Valve: There is mild to moderate regurgitation.    IVC/SVC: Elevated venous pressure at 15 mmHg.    Left pleural effusion.          Assessment and Plan:    Acute exacerbation of HFpEF: Patient presented to Emergency  Department with SOB, scrotal edema, and BLE edema. He was started on IV furosemide. This morning, he reports improving scrotal and BLE edema; however, not at baseline.             - Continue IV Lasix as dosed            - Continue metolazone as dosed            - Continue spironolactone as dosed            - Monitor daily I's & O's            - Low Na+ diet            - Echocardiogram shows normal LV systolic function with an EF of 55- 60%. It also shows mild MR and moderate TR.      Atrial Fibrillation: Patient with history of atrial fibrillation. Telemetry this morning continues to show atrial fibrillation with a controlled rate. He is not currently on anticoagulation given cirrhosis.            - Continue metoprolol tartrate as dosed           - Continue telemetry           - Agree with no anticoagulation given cirrhosis     Cirrhosis: Management per primary team          Active Diagnoses:    Diagnosis Date Noted POA    PRINCIPAL PROBLEM:  Decompensated cirrhosis [K72.90, K74.60] 10/04/2024 Yes    Scrotal edema [N50.89] 04/13/2025 Yes    Anasarca [R60.1] 04/13/2025 Yes    Thrombocytopenia [D69.6] 11/21/2024 Yes    Hepatic encephalopathy [K76.82] 11/21/2024 Yes    CKD (chronic kidney disease) stage 4, GFR 15-29 ml/min [N18.4] 11/20/2024 Yes    Longstanding persistent atrial fibrillation [I48.11] 11/29/2022 Yes    Cirrhosis [K74.60] 07/24/2014 Yes      Problems Resolved During this Admission:       VTE Risk Mitigation (From admission, onward)           Ordered     IP VTE HIGH RISK PATIENT  Once         04/12/25 2327     Place sequential compression device  Until discontinued         04/12/25 2327     Place ANDREW hose  Until discontinued         04/12/25 2327                  Provider's Attestation:  I, Luis Liu MD, confirm that OCTAVIA Starr worked under my direction at all times.  Documentation shall reflect findings and decisions made by myself in the course of the patient's treatment.  I have  performed a face to face evaluation of the patient and reviewed the medical record. In addition, I have performed the substantive portion of the medical decision making. I have reviewed the notes and assessment, and I concur with her documentation.  CMS guidelines regarding the use of scribes shall be adhered to.  MD Luis Lewis MD  Cardiology  [facility]

## 2025-04-22 NOTE — ASSESSMENT & PLAN NOTE
4/14 FM:  Stable.  4/15 FM:  Cont current lactulose, adding xifaxin.  4/18 FM:  Cont to improve.  4/22 FM:  Much improved.

## 2025-04-22 NOTE — ASSESSMENT & PLAN NOTE
4/14 FM:  Not much change overnight, cont IV lasix drip.  4/15 FM:  Improving slowly, echo noted.  4/16 FM:  Slowly improving.  4/17 FM:  Much improved today.  4/18 FM:  Cont to improve.  4/19 FM:  Transitioning to pulse dosing of lasix.  4/20 FM:  Digressed, addding metolazone, may need UF.  4/21 FM:  Has responded to change in meds.  4/22 FM:  Much improved.

## 2025-04-22 NOTE — ASSESSMENT & PLAN NOTE
Patient has persistent (7 days or more) atrial fibrillation. Patient is currently in atrial fibrillation. [id number] Score: 1. The patients heart rate in the last 24 hours is as follows:  Pulse  Min: 71  Max: 87     Antiarrhythmics  metoprolol tartrate (LOPRESSOR) tablet 50 mg, 2 times daily, Oral    Anticoagulants       Plan  - Replete lytes with a goal of K>4, Mg >2  - Patient is not anticoagulated due to thrombocytopenia   - Patient's afib is currently uncontrolled. Will adjust treatment as follows: adjust lopressor dose  - cardiology consulted    4/14 FM:  No anticoags 2nd CLD, Plt Count.  4/15 FM:  Cont. Txt plan.

## 2025-04-22 NOTE — ASSESSMENT & PLAN NOTE
The likely etiology of thrombocytopenia is liver disease. The patients 3 most recent labs are listed below.  Recent Labs     04/20/25  0655 04/21/25  0601 04/22/25  0645   PLT 70* 67* 79*     Plan  - Will transfuse if platelet count is <20k.

## 2025-04-23 LAB
ABSOLUTE EOSINOPHIL (OHS): 0.23 K/UL
ABSOLUTE MONOCYTE (OHS): 0.99 K/UL (ref 0.3–1)
ABSOLUTE NEUTROPHIL COUNT (OHS): 2.97 K/UL (ref 1.8–7.7)
ALBUMIN SERPL BCP-MCNC: 1.9 G/DL (ref 3.5–5.2)
ALP SERPL-CCNC: 73 UNIT/L (ref 40–150)
ALT SERPL W/O P-5'-P-CCNC: 8 UNIT/L (ref 10–44)
ANION GAP (OHS): 8 MMOL/L (ref 8–16)
AST SERPL-CCNC: 28 UNIT/L (ref 11–45)
BASOPHILS # BLD AUTO: 0.05 K/UL
BASOPHILS NFR BLD AUTO: 0.9 %
BILIRUB SERPL-MCNC: 3 MG/DL (ref 0.1–1)
BUN SERPL-MCNC: 28 MG/DL (ref 8–23)
CALCIUM SERPL-MCNC: 8.8 MG/DL (ref 8.7–10.5)
CHLORIDE SERPL-SCNC: 88 MMOL/L (ref 95–110)
CO2 SERPL-SCNC: 38 MMOL/L (ref 23–29)
CREAT SERPL-MCNC: 1.9 MG/DL (ref 0.5–1.4)
ERYTHROCYTE [DISTWIDTH] IN BLOOD BY AUTOMATED COUNT: 15.8 % (ref 11.5–14.5)
GFR SERPLBLD CREATININE-BSD FMLA CKD-EPI: 37 ML/MIN/1.73/M2
GLUCOSE SERPL-MCNC: 99 MG/DL (ref 70–110)
HCT VFR BLD AUTO: 29.9 % (ref 40–54)
HGB BLD-MCNC: 9.9 GM/DL (ref 14–18)
IMM GRANULOCYTES # BLD AUTO: 0.03 K/UL (ref 0–0.04)
IMM GRANULOCYTES NFR BLD AUTO: 0.6 % (ref 0–0.5)
LYMPHOCYTES # BLD AUTO: 1.17 K/UL (ref 1–4.8)
MAGNESIUM SERPL-MCNC: 1.4 MG/DL (ref 1.6–2.6)
MCH RBC QN AUTO: 31.1 PG (ref 27–31)
MCHC RBC AUTO-ENTMCNC: 33.1 G/DL (ref 32–36)
MCV RBC AUTO: 94 FL (ref 82–98)
NUCLEATED RBC (/100WBC) (OHS): 0 /100 WBC
PLATELET # BLD AUTO: 79 K/UL (ref 150–450)
PMV BLD AUTO: 11.4 FL (ref 9.2–12.9)
POTASSIUM SERPL-SCNC: 3.7 MMOL/L (ref 3.5–5.1)
PROT SERPL-MCNC: 6.4 GM/DL (ref 6–8.4)
RBC # BLD AUTO: 3.18 M/UL (ref 4.6–6.2)
RELATIVE EOSINOPHIL (OHS): 4.2 %
RELATIVE LYMPHOCYTE (OHS): 21.5 % (ref 18–48)
RELATIVE MONOCYTE (OHS): 18.2 % (ref 4–15)
RELATIVE NEUTROPHIL (OHS): 54.6 % (ref 38–73)
SODIUM SERPL-SCNC: 134 MMOL/L (ref 136–145)
WBC # BLD AUTO: 5.44 K/UL (ref 3.9–12.7)

## 2025-04-23 PROCEDURE — 83735 ASSAY OF MAGNESIUM: CPT | Performed by: INTERNAL MEDICINE

## 2025-04-23 PROCEDURE — 63600175 PHARM REV CODE 636 W HCPCS: Performed by: INTERNAL MEDICINE

## 2025-04-23 PROCEDURE — 25000003 PHARM REV CODE 250: Performed by: INTERNAL MEDICINE

## 2025-04-23 PROCEDURE — 11000001 HC ACUTE MED/SURG PRIVATE ROOM

## 2025-04-23 PROCEDURE — 85025 COMPLETE CBC W/AUTO DIFF WBC: CPT | Performed by: INTERNAL MEDICINE

## 2025-04-23 PROCEDURE — 36415 COLL VENOUS BLD VENIPUNCTURE: CPT | Performed by: INTERNAL MEDICINE

## 2025-04-23 PROCEDURE — 25000003 PHARM REV CODE 250: Performed by: EMERGENCY MEDICINE

## 2025-04-23 PROCEDURE — 80053 COMPREHEN METABOLIC PANEL: CPT | Performed by: INTERNAL MEDICINE

## 2025-04-23 RX ORDER — FUROSEMIDE 10 MG/ML
80 INJECTION INTRAMUSCULAR; INTRAVENOUS EVERY 12 HOURS
Status: DISCONTINUED | OUTPATIENT
Start: 2025-04-23 | End: 2025-04-24 | Stop reason: HOSPADM

## 2025-04-23 RX ADMIN — METOLAZONE 10 MG: 5 TABLET ORAL at 10:04

## 2025-04-23 RX ADMIN — FAMOTIDINE 20 MG: 20 TABLET, FILM COATED ORAL at 10:04

## 2025-04-23 RX ADMIN — METOPROLOL TARTRATE 50 MG: 50 TABLET, FILM COATED ORAL at 09:04

## 2025-04-23 RX ADMIN — LACTULOSE 20 G: 20 SOLUTION ORAL at 10:04

## 2025-04-23 RX ADMIN — POTASSIUM BICARBONATE 20 MEQ: 391 TABLET, EFFERVESCENT ORAL at 10:04

## 2025-04-23 RX ADMIN — LACTULOSE 20 G: 20 SOLUTION ORAL at 09:04

## 2025-04-23 RX ADMIN — SPIRONOLACTONE 50 MG: 25 TABLET ORAL at 09:04

## 2025-04-23 RX ADMIN — Medication 100 MG: at 10:04

## 2025-04-23 RX ADMIN — MAGNESIUM 64 MG (MAGNESIUM CHLORIDE) TABLET,DELAYED RELEASE 128 MG: at 09:04

## 2025-04-23 RX ADMIN — POTASSIUM BICARBONATE 20 MEQ: 391 TABLET, EFFERVESCENT ORAL at 09:04

## 2025-04-23 RX ADMIN — FUROSEMIDE 80 MG: 10 INJECTION, SOLUTION INTRAVENOUS at 09:04

## 2025-04-23 RX ADMIN — RIFAXIMIN 550 MG: 550 TABLET ORAL at 09:04

## 2025-04-23 RX ADMIN — Medication 6 MG: at 09:04

## 2025-04-23 RX ADMIN — TAMSULOSIN HYDROCHLORIDE 0.4 MG: 0.4 CAPSULE ORAL at 10:04

## 2025-04-23 RX ADMIN — MAGNESIUM 64 MG (MAGNESIUM CHLORIDE) TABLET,DELAYED RELEASE 128 MG: at 10:04

## 2025-04-23 RX ADMIN — RIFAXIMIN 550 MG: 550 TABLET ORAL at 10:04

## 2025-04-23 RX ADMIN — SPIRONOLACTONE 50 MG: 25 TABLET ORAL at 10:04

## 2025-04-23 RX ADMIN — METOPROLOL TARTRATE 50 MG: 50 TABLET, FILM COATED ORAL at 10:04

## 2025-04-23 NOTE — ASSESSMENT & PLAN NOTE
Patient with known Cirrhosis with Child's class Calculator for Child's score link- https://www.[employer].com/calc/340/child-ocampo-score-cirrhosis-mortality#creator-insights. Co-morbidities are present and inclusive of esophageal varices, hepatic encephalopathy, and anemia/pancytopenia.  MELD-Na score calculated; MELD 3.0: 27 at 4/15/2025  5:36 AM  MELD-Na: 26 at 4/15/2025  5:36 AM  Calculated from:  Serum Creatinine: 2.2 mg/dL at 4/15/2025  5:36 AM  Serum Sodium: 135 mmol/L at 4/15/2025  5:36 AM  Total Bilirubin: 3.5 mg/dL at 4/15/2025  5:36 AM  Serum Albumin: 2 g/dL at 4/15/2025  5:36 AM  INR(ratio): 1.7 at 4/13/2025 12:42 PM  Age at listing (hypothetical): 69 years  Sex: Male at 4/15/2025  5:36 AM      Continue chronic meds. Etiology likely ETOH. Will avoid any hepatotoxic meds, and monitor CBC/CMP/INR for synthetic function.     4/16 FM:  Slowly improving.  4/18 FM:  Cont to improve.  4/19 FM:  Home soon.  4/20 FM:  Has declined, see above and changes.  4/21 FM: Has responded to change in meds.

## 2025-04-23 NOTE — SUBJECTIVE & OBJECTIVE
"Past Medical History:   Diagnosis Date    Atrial fibrillation     Bulging disc     Cirrhosis     Colon polyp 12/29/2017    Rpt 5 yrs    Encounter for blood transfusion     EV (esophageal varices)     Hypertension 03/30/2023    Renal disorder     Skin cancer 03/2017    'NOSE"    Thrombocytopenia        Past Surgical History:   Procedure Laterality Date    CATHETERIZATION OF BOTH LEFT AND RIGHT HEART N/A 02/08/2023    Procedure: CATHETERIZATION, HEART, BOTH LEFT AND RIGHT;  Surgeon: Flo Malone MD;  Location: [facility] CATH LAB;  Service: Cardiology;  Laterality: N/A;  low bleeding risk 1.0%    CHOLECYSTECTOMY      COLONOSCOPY      COLONOSCOPY N/A 12/29/2017    ta rpt 2022    CORONARY ANGIOGRAPHY N/A 02/08/2023    Procedure: ANGIOGRAM, CORONARY ARTERY;  Surgeon: Flo Malone MD;  Location: [facility] CATH LAB;  Service: Cardiology;  Laterality: N/A;    HERNIA REPAIR      INCISION AND DRAINAGE Right 02/27/2025    Procedure: Incision and Drainage, thigh;  Surgeon: Kayla Foster MD;  Location: [facility];  Service: General;  Laterality: Right;  Plan to go first if pt has cardiac clearance - SB    SKIN BIOPSY  03/2017    TONSILLECTOMY      UPPER GASTROINTESTINAL ENDOSCOPY  2011    EV    UPPER GASTROINTESTINAL ENDOSCOPY  2016    VASECTOMY         Review of patient's allergies indicates:  No Known Allergies    No current facility-administered medications on file prior to encounter.     Current Outpatient Medications on File Prior to Encounter   Medication Sig    famotidine (PEPCID) 20 MG tablet Take 1 tablet (20 mg total) by mouth once daily.    furosemide (LASIX) 80 MG tablet Take 1 tablet (80 mg total) by mouth 2 (two) times a day for 3 days, THEN 1 tablet (80 mg total) once daily.    lactulose (CHRONULAC) 20 gram/30 mL Soln Take 45 mLs (30 g total) by mouth 3 (three) times daily.    magnesium chloride (MAG 64) 64 mg TbEC Take 2 tablets (128 mg total) by mouth once daily.    metoprolol tartrate (LOPRESSOR) 25 MG tablet " TAKE 0.5 TABLETS BY MOUTH 2 TIMES DAILY.    midodrine (PROAMATINE) 5 MG Tab Take 3 tablets (15 mg total) by mouth 3 (three) times daily with meals.    potassium bicarbonate (K-LYTE) disintegrating tablet Take 1 tablet (25 mEq total) by mouth 2 (two) times a day.    spironolactone (ALDACTONE) 25 MG tablet Take 1 tablet (25 mg total) by mouth 2 (two) times daily.    tamsulosin (FLOMAX) 0.4 mg Cap Take 1 capsule (0.4 mg total) by mouth once daily.    thiamine 100 MG tablet Take 100 mg by mouth once daily.    apixaban (ELIQUIS) 5 mg Tab Take 5 mg by mouth 2 (two) times daily.     Family History       Problem Relation (Age of Onset)    Alzheimer's disease Mother    Cancer Maternal Uncle    Heart disease Maternal Uncle    Hyperlipidemia Brother    No Known Problems Father    Ovarian cancer Sister          Tobacco Use    Smoking status: Never     Passive exposure: Never    Smokeless tobacco: Never   Substance and Sexual Activity    Alcohol use: Not Currently     Alcohol/week: 0.0 standard drinks of alcohol    Drug use: No    Sexual activity: Not Currently     Review of Systems   Constitutional:  Positive for fatigue. Negative for chills and fever.   HENT:  Negative for congestion.    Eyes:  Negative for visual disturbance.   Respiratory:  Positive for shortness of breath. Negative for wheezing.    Cardiovascular:  Positive for leg swelling. Negative for chest pain and palpitations.   Gastrointestinal:  Positive for abdominal distention. Negative for abdominal pain, constipation, diarrhea, nausea and vomiting.   Endocrine: Negative for polyuria.   Genitourinary:  Positive for scrotal swelling (Scrotal edema). Negative for dysuria.   Musculoskeletal:  Positive for arthralgias. Negative for back pain and gait problem.   Skin:  Negative for wound.   Neurological:  Positive for weakness.   Psychiatric/Behavioral:  The patient is not nervous/anxious.      Objective:     Vital Signs (Most Recent):  Temp: 98.3 °F (36.8 °C)  (04/23/25 0400)  Pulse: 77 (04/23/25 0400)  Resp: 16 (04/23/25 0400)  BP: (!) 112/51 (04/23/25 0400)  SpO2: 98 % (04/23/25 0400) Vital Signs (24h Range):  Temp:  [97.8 °F (36.6 °C)-98.3 °F (36.8 °C)] 98.3 °F (36.8 °C)  Pulse:  [77-82] 77  Resp:  [16-20] 16  SpO2:  [95 %-99 %] 98 %  BP: ()/(49-63) 112/51     Weight: 126 kg (277 lb 12.8 oz)  Body mass index is 36.65 kg/m².     Physical Exam  Vitals and nursing note reviewed.   Constitutional:       General: He is not in acute distress.     Appearance: He is well-developed. He is obese. He is ill-appearing. He is not diaphoretic.   HENT:      Head: Normocephalic and atraumatic.      Nose: No congestion or rhinorrhea.   Eyes:      General: Scleral icterus present.         Right eye: No discharge.         Left eye: No discharge.      Extraocular Movements: Extraocular movements intact.   Neck:      Thyroid: No thyromegaly.      Vascular: No JVD.   Cardiovascular:      Rate and Rhythm: Normal rate. Rhythm irregular.      Heart sounds: Normal heart sounds. No murmur heard.     No friction rub. No gallop.   Pulmonary:      Effort: No respiratory distress.      Breath sounds: No wheezing, rhonchi or rales.   Abdominal:      General: There is distension.      Tenderness: There is no abdominal tenderness. There is no guarding or rebound.      Hernia: No hernia is present.   Genitourinary:     Comments: Scrotal edema ++ improved  Musculoskeletal:      Cervical back: Neck supple.      Right lower leg: Edema present.      Left lower leg: Edema present.   Neurological:      Mental Status: He is alert and oriented to person, place, and time. Mental status is at baseline.      Motor: Weakness present.   Psychiatric:         Behavior: Behavior normal.         Thought Content: Thought content normal.                Significant Labs: All pertinent labs within the past 24 hours have been reviewed.  Recent Lab Results         04/23/25  0546        Albumin 1.9       ALP 73       ALT  8       Anion Gap 8       AST 28       Baso # 0.05       Basophil % 0.9       BILIRUBIN TOTAL 3.0  Comment: For infants and newborns, interpretation of results should be based   on gestational age, weight and in agreement with clinical   observations.    Premature Infant recommended reference ranges:   0-24 hours:  <8.0 mg/dL   24-48 hours: <12.0 mg/dL   3-5 days:    <15.0 mg/dL   6-29 days:   <15.0 mg/dL       BUN 28       Calcium 8.8       Chloride 88       CO2 38       Creatinine 1.9       eGFR 37  Comment: Estimated GFR calculated using the CKD-EPI creatinine (2021) equation.       Eos # 0.23       Eos % 4.2       Glucose 99       Gran # (ANC) 2.97       Hematocrit 29.9       Hemoglobin 9.9       Immature Grans (Abs) 0.03  Comment: Mild elevation in immature granulocytes is non specific and can be seen in a variety of conditions including stress response, acute inflammation, trauma and pregnancy. Correlation with other laboratory and clinical findings is essential.       Immature Granulocytes 0.6       Lymph # 1.17       Lymph % 21.5       Magnesium  1.4       MCH 31.1       MCHC 33.1       MCV 94       Mono # 0.99       Mono % 18.2       MPV 11.4       Neut % 54.6       nRBC 0       Platelet Count 79       Potassium 3.7       PROTEIN TOTAL 6.4       RBC 3.18       RDW 15.8       Sodium 134       WBC 5.44               Significant Imaging: I have reviewed all pertinent imaging results/findings within the past 24 hours.

## 2025-04-23 NOTE — ASSESSMENT & PLAN NOTE
Patient with known Cirrhosis with Child's class Calculator for Child's score link- https://www.[employer].com/calc/340/child-ocampo-score-cirrhosis-mortality#creator-insights. Co-morbidities are present and inclusive of ascites and anemia/pancytopenia.  MELD-Na score calculated; MELD 3.0: 27 at 4/15/2025  5:36 AM  MELD-Na: 26 at 4/15/2025  5:36 AM  Calculated from:  Serum Creatinine: 2.2 mg/dL at 4/15/2025  5:36 AM  Serum Sodium: 135 mmol/L at 4/15/2025  5:36 AM  Total Bilirubin: 3.5 mg/dL at 4/15/2025  5:36 AM  Serum Albumin: 2 g/dL at 4/15/2025  5:36 AM  INR(ratio): 1.7 at 4/13/2025 12:42 PM  Age at listing (hypothetical): 69 years  Sex: Male at 4/15/2025  5:36 AM      Continue chronic meds. Etiology likely ETOH. Will avoid any hepatotoxic meds, and monitor CBC/CMP/INR for synthetic function.   Patient reported     4/15 FM:  DC above, stopping midrodine.

## 2025-04-23 NOTE — ASSESSMENT & PLAN NOTE
The likely etiology of thrombocytopenia is liver disease. The patients 3 most recent labs are listed below.  Recent Labs     04/21/25  0601 04/22/25  0645 04/23/25  0546   PLT 67* 79* 79*     Plan  - Will transfuse if platelet count is <20k.

## 2025-04-23 NOTE — ASSESSMENT & PLAN NOTE
Creatine stable for now. BMP reviewed- noted Estimated Creatinine Clearance: 49.6 mL/min (A) (based on SCr of 1.9 mg/dL (H)). according to latest data. Based on current GFR, CKD stage is stage 4 - GFR 15-29.  Monitor UOP and serial BMP and adjust therapy as needed. Renally dose meds. Avoid nephrotoxic medications and procedures.    4/14 FM:  Will not DC tunneled cath just yet.  4/15 FM:  Patient is responding to lasix drip.  4/16 FM:  Slowly improving.  4/17 FM:  Improving with diuresis.  4/18 FM:  Cont to improve.  4/19 FM:  Home soon.  4/20 FM:  As above.  4/22 FM:  Labs noted, improved as we diuresis.

## 2025-04-23 NOTE — PROGRESS NOTES
[facility] Medicine  Progress Note    Patient Name: Juan Carlos Yoo Sr.  MRN: [id number]  Patient Class: IP- Inpatient   Admission Date: 4/12/2025  Length of Stay: 9 days  Attending Physician: Joao Villegas III, MD  Primary Care Provider: Joao Villegas III, MD        Subjective     Principal Problem:Decompensated cirrhosis        HPI:  Patient 69-year-old male history of AFib, cirrhosis, hypertension came to the ED with complaints of increased swelling to testicular region, lower extremity, patient had been seen previously for same issue, patient is on Lasix and Aldactone, increased pain due to swelling, associated orthopnea and dyspnea on exertion reported no nausea vomiting reported, patient denied any fever chills    Overview/Hospital Course:  4/14 FM:  Patient known to me, patient presented to the emergency department with an acute 6 lb weight gain severe painful scrotal edema and dyspnea.  Patient is on an IV Lasix drip with some response.  Scrotum is still massive today.  Patient is not taking anticoagulation because of his thrombocytopenia and cirrhosis cardiology has been consulted patient also had a planned surgical removal of his tunneled dialysis catheter in the right chest for this week which may be on hold depending on how he responds to treatment this admission.  I have contacted surgery.  4/15 FM:  Patient is responding to the Lasix drip and had decent urine output, patient's scrotal edema has reduced about 30%.  Patient's electrolytes noted we will begin replacing potassium and magnesium as we diurese patient's midodrine was discontinued and I have explained to him that he will likely have chronic hypotension and once we get to a adequate volume level we will see how he tolerates his blood pressures.  We will likely require a prolonged hospitalization.  4/16 FM:  Patient is diuresing slowly, patient's weight has not changed much on measurement but question accuracy.  Patient's  scrotal edema is improved some but still persist.  Patient's creatinine is down on IV Lasix drip, we will consult patient's nephrologist to follow along.  Leaving Vas-Cath in place for now.  Patient's getting out of bed and working with therapy which I have continued to encourage.  Midodrine is on hold which I do suspect was contributing to volume retention.  4/17 FM:  Patient's diuresing well and has had a significant improvement in the last 24 hours.  Patient's electrolytes noted continue repletion continue hospitalization and IV Lasix drip for another 24 hours.  We will likely transition to p.o. in a.m..  Mental status seems to be improved keep working with therapy.  4/18 FM:  Patient is continuing to diurese scrotal edema almost resolved patient's still has quite a bit of volume left to attempt remove blood pressure improved creatinine improved.  Continue to press forward with current treatment.  4/19 FM:  Patient has now been on a Lasix drip for the last 6 days, we will transitioned to pulse dosing and then p.o..  Patient has lost significant edema and fluid weight but still has a ways to go.  Can likely transition to p.o. and continue this at home.  4/20 FM:  Patient's diuresis has halted and he actually gained a 1 lb overnight.  I have reached out to the patient's nephrologist and she is adding metolazone daily and will ultrafiltrate if no progress tomorrow.  4/21 FM:  Patient had significant diuresis through the night with the addition of metolazone, patient's -3800 mL.  Patient's edema improved his weight is down we will continue with current medications renal assisting and following.  4/22 FM:  Patient diuresed another 3100 mL overnight.  Significant difference in exam today.  Continue current care plan and watch electrolytes and renal function.  4/23 FM:  Out 9L last 24 hrs, great response to txt, decrease lasix to BID today, home in next couple of days.    Past Medical History:   Diagnosis Date    Atrial  "fibrillation     Bulging disc     Cirrhosis     Colon polyp 12/29/2017    Rpt 5 yrs    Encounter for blood transfusion     EV (esophageal varices)     Hypertension 03/30/2023    Renal disorder     Skin cancer 03/2017    'NOSE"    Thrombocytopenia        Past Surgical History:   Procedure Laterality Date    CATHETERIZATION OF BOTH LEFT AND RIGHT HEART N/A 02/08/2023    Procedure: CATHETERIZATION, HEART, BOTH LEFT AND RIGHT;  Surgeon: Flo Malone MD;  Location: [facility] CATH LAB;  Service: Cardiology;  Laterality: N/A;  low bleeding risk 1.0%    CHOLECYSTECTOMY      COLONOSCOPY      COLONOSCOPY N/A 12/29/2017    ta rpt 2022    CORONARY ANGIOGRAPHY N/A 02/08/2023    Procedure: ANGIOGRAM, CORONARY ARTERY;  Surgeon: Flo Malone MD;  Location: [facility] CATH LAB;  Service: Cardiology;  Laterality: N/A;    HERNIA REPAIR      INCISION AND DRAINAGE Right 02/27/2025    Procedure: Incision and Drainage, thigh;  Surgeon: Kayla Foster MD;  Location: [facility];  Service: General;  Laterality: Right;  Plan to go first if pt has cardiac clearance - SB    SKIN BIOPSY  03/2017    TONSILLECTOMY      UPPER GASTROINTESTINAL ENDOSCOPY  2011    EV    UPPER GASTROINTESTINAL ENDOSCOPY  2016    VASECTOMY         Review of patient's allergies indicates:  No Known Allergies    No current facility-administered medications on file prior to encounter.     Current Outpatient Medications on File Prior to Encounter   Medication Sig    famotidine (PEPCID) 20 MG tablet Take 1 tablet (20 mg total) by mouth once daily.    furosemide (LASIX) 80 MG tablet Take 1 tablet (80 mg total) by mouth 2 (two) times a day for 3 days, THEN 1 tablet (80 mg total) once daily.    lactulose (CHRONULAC) 20 gram/30 mL Soln Take 45 mLs (30 g total) by mouth 3 (three) times daily.    magnesium chloride (MAG 64) 64 mg TbEC Take 2 tablets (128 mg total) by mouth once daily.    metoprolol tartrate (LOPRESSOR) 25 MG tablet TAKE 0.5 TABLETS BY MOUTH 2 TIMES DAILY.    " midodrine (PROAMATINE) 5 MG Tab Take 3 tablets (15 mg total) by mouth 3 (three) times daily with meals.    potassium bicarbonate (K-LYTE) disintegrating tablet Take 1 tablet (25 mEq total) by mouth 2 (two) times a day.    spironolactone (ALDACTONE) 25 MG tablet Take 1 tablet (25 mg total) by mouth 2 (two) times daily.    tamsulosin (FLOMAX) 0.4 mg Cap Take 1 capsule (0.4 mg total) by mouth once daily.    thiamine 100 MG tablet Take 100 mg by mouth once daily.    apixaban (ELIQUIS) 5 mg Tab Take 5 mg by mouth 2 (two) times daily.     Family History       Problem Relation (Age of Onset)    Alzheimer's disease Mother    Cancer Maternal Uncle    Heart disease Maternal Uncle    Hyperlipidemia Brother    No Known Problems Father    Ovarian cancer Sister          Tobacco Use    Smoking status: Never     Passive exposure: Never    Smokeless tobacco: Never   Substance and Sexual Activity    Alcohol use: Not Currently     Alcohol/week: 0.0 standard drinks of alcohol    Drug use: No    Sexual activity: Not Currently     Review of Systems   Constitutional:  Positive for fatigue. Negative for chills and fever.   HENT:  Negative for congestion.    Eyes:  Negative for visual disturbance.   Respiratory:  Positive for shortness of breath. Negative for wheezing.    Cardiovascular:  Positive for leg swelling. Negative for chest pain and palpitations.   Gastrointestinal:  Positive for abdominal distention. Negative for abdominal pain, constipation, diarrhea, nausea and vomiting.   Endocrine: Negative for polyuria.   Genitourinary:  Positive for scrotal swelling (Scrotal edema). Negative for dysuria.   Musculoskeletal:  Positive for arthralgias. Negative for back pain and gait problem.   Skin:  Negative for wound.   Neurological:  Positive for weakness.   Psychiatric/Behavioral:  The patient is not nervous/anxious.      Objective:     Vital Signs (Most Recent):  Temp: 98.3 °F (36.8 °C) (04/23/25 0400)  Pulse: 77 (04/23/25 0400)  Resp:  16 (04/23/25 0400)  BP: (!) 112/51 (04/23/25 0400)  SpO2: 98 % (04/23/25 0400) Vital Signs (24h Range):  Temp:  [97.8 °F (36.6 °C)-98.3 °F (36.8 °C)] 98.3 °F (36.8 °C)  Pulse:  [77-82] 77  Resp:  [16-20] 16  SpO2:  [95 %-99 %] 98 %  BP: ()/(49-63) 112/51     Weight: 126 kg (277 lb 12.8 oz)  Body mass index is 36.65 kg/m².     Physical Exam  Vitals and nursing note reviewed.   Constitutional:       General: He is not in acute distress.     Appearance: He is well-developed. He is obese. He is ill-appearing. He is not diaphoretic.   HENT:      Head: Normocephalic and atraumatic.      Nose: No congestion or rhinorrhea.   Eyes:      General: Scleral icterus present.         Right eye: No discharge.         Left eye: No discharge.      Extraocular Movements: Extraocular movements intact.   Neck:      Thyroid: No thyromegaly.      Vascular: No JVD.   Cardiovascular:      Rate and Rhythm: Normal rate. Rhythm irregular.      Heart sounds: Normal heart sounds. No murmur heard.     No friction rub. No gallop.   Pulmonary:      Effort: No respiratory distress.      Breath sounds: No wheezing, rhonchi or rales.   Abdominal:      General: There is distension.      Tenderness: There is no abdominal tenderness. There is no guarding or rebound.      Hernia: No hernia is present.   Genitourinary:     Comments: Scrotal edema ++ improved  Musculoskeletal:      Cervical back: Neck supple.      Right lower leg: Edema present.      Left lower leg: Edema present.   Neurological:      Mental Status: He is alert and oriented to person, place, and time. Mental status is at baseline.      Motor: Weakness present.   Psychiatric:         Behavior: Behavior normal.         Thought Content: Thought content normal.                Significant Labs: All pertinent labs within the past 24 hours have been reviewed.  Recent Lab Results         04/23/25  0546        Albumin 1.9       ALP 73       ALT 8       Anion Gap 8       AST 28       Baso #  0.05       Basophil % 0.9       BILIRUBIN TOTAL 3.0  Comment: For infants and newborns, interpretation of results should be based   on gestational age, weight and in agreement with clinical   observations.    Premature Infant recommended reference ranges:   0-24 hours:  <8.0 mg/dL   24-48 hours: <12.0 mg/dL   3-5 days:    <15.0 mg/dL   6-29 days:   <15.0 mg/dL       BUN 28       Calcium 8.8       Chloride 88       CO2 38       Creatinine 1.9       eGFR 37  Comment: Estimated GFR calculated using the CKD-EPI creatinine (2021) equation.       Eos # 0.23       Eos % 4.2       Glucose 99       Gran # (ANC) 2.97       Hematocrit 29.9       Hemoglobin 9.9       Immature Grans (Abs) 0.03  Comment: Mild elevation in immature granulocytes is non specific and can be seen in a variety of conditions including stress response, acute inflammation, trauma and pregnancy. Correlation with other laboratory and clinical findings is essential.       Immature Granulocytes 0.6       Lymph # 1.17       Lymph % 21.5       Magnesium  1.4       MCH 31.1       MCHC 33.1       MCV 94       Mono # 0.99       Mono % 18.2       MPV 11.4       Neut % 54.6       nRBC 0       Platelet Count 79       Potassium 3.7       PROTEIN TOTAL 6.4       RBC 3.18       RDW 15.8       Sodium 134       WBC 5.44               Significant Imaging: I have reviewed all pertinent imaging results/findings within the past 24 hours.      Assessment & Plan  Decompensated cirrhosis  Patient with known Cirrhosis with Child's class Calculator for Child's score link- https://www.mdcalc.com/calc/340/child-ocampo-score-cirrhosis-mortality#creator-insights. Co-morbidities are present and inclusive of esophageal varices, hepatic encephalopathy, and anemia/pancytopenia.  MELD-Na score calculated; MELD 3.0: 27 at 4/15/2025  5:36 AM  MELD-Na: 26 at 4/15/2025  5:36 AM  Calculated from:  Serum Creatinine: 2.2 mg/dL at 4/15/2025  5:36 AM  Serum Sodium: 135 mmol/L at 4/15/2025  5:36  AM  Total Bilirubin: 3.5 mg/dL at 4/15/2025  5:36 AM  Serum Albumin: 2 g/dL at 4/15/2025  5:36 AM  INR(ratio): 1.7 at 4/13/2025 12:42 PM  Age at listing (hypothetical): 69 years  Sex: Male at 4/15/2025  5:36 AM      Continue chronic meds. Etiology likely ETOH. Will avoid any hepatotoxic meds, and monitor CBC/CMP/INR for synthetic function.     4/16 FM:  Slowly improving.  4/18 FM:  Cont to improve.  4/19 FM:  Home soon.  4/20 FM:  Has declined, see above and changes.  4/21 FM: Has responded to change in meds.   Cirrhosis  Patient with known Cirrhosis with Child's class Calculator for Child's score link- https://www.[employer]/calc/340/child-ocampo-score-cirrhosis-mortality#creator-insights. Co-morbidities are present and inclusive of ascites and anemia/pancytopenia.  MELD-Na score calculated; MELD 3.0: 27 at 4/15/2025  5:36 AM  MELD-Na: 26 at 4/15/2025  5:36 AM  Calculated from:  Serum Creatinine: 2.2 mg/dL at 4/15/2025  5:36 AM  Serum Sodium: 135 mmol/L at 4/15/2025  5:36 AM  Total Bilirubin: 3.5 mg/dL at 4/15/2025  5:36 AM  Serum Albumin: 2 g/dL at 4/15/2025  5:36 AM  INR(ratio): 1.7 at 4/13/2025 12:42 PM  Age at listing (hypothetical): 69 years  Sex: Male at 4/15/2025  5:36 AM      Continue chronic meds. Etiology likely ETOH. Will avoid any hepatotoxic meds, and monitor CBC/CMP/INR for synthetic function.   Patient reported     4/15 FM:  DC above, stopping midrodine.  Longstanding persistent atrial fibrillation  Patient has persistent (7 days or more) atrial fibrillation. Patient is currently in atrial fibrillation. [id number] Score: 1. The patients heart rate in the last 24 hours is as follows:  Pulse  Min: 77  Max: 82     Antiarrhythmics  metoprolol tartrate (LOPRESSOR) tablet 50 mg, 2 times daily, Oral    Anticoagulants       Plan  - Replete lytes with a goal of K>4, Mg >2  - Patient is not anticoagulated due to thrombocytopenia   - Patient's afib is currently uncontrolled. Will adjust treatment as follows:  adjust lopressor dose  - cardiology consulted    4/14 FM:  No anticoags 2nd CLD, Plt Count.  4/15 FM:  Cont. Txt plan.  Thrombocytopenia  The likely etiology of thrombocytopenia is liver disease. The patients 3 most recent labs are listed below.  Recent Labs     04/21/25  0601 04/22/25  0645 04/23/25  0546   PLT 67* 79* 79*     Plan  - Will transfuse if platelet count is <20k.    Scrotal edema  4/14 FM:  Not much change overnight, cont IV lasix drip.  4/15 FM:  Improving slowly, echo noted.  4/16 FM:  Slowly improving.  4/17 FM:  Much improved today.  4/18 FM:  Cont to improve.  4/19 FM:  Transitioning to pulse dosing of lasix.  4/20 FM:  Digressed, addding metolazone, may need UF.  4/21 FM:  Has responded to change in meds.  4/22 FM:  Much improved.  4/23 FM:  Cont to improve.    Anasarca  2/2 to cirrhosis, diuresis on board, replete electrolytes, monitor urine output    4/14 FM:  Stopping midrodine today, possible related to volume retention.  4/15 FM:  Slowly improving, need to establish lowest tolerable dry weight.  4/16 FM:  Slowly improving.  4/17 FM:  Cont to improve.  4/18 FM:  Cont to improve.  4/19 FM:  Home soon.  4/20 FM:  As above.  4/21 FM: Has responded to change in meds.   4/22 FM:  Much improved.  4/23 FM:  Cont to improve.  CKD (chronic kidney disease) stage 4, GFR 15-29 ml/min  Creatine stable for now. BMP reviewed- noted Estimated Creatinine Clearance: 49.6 mL/min (A) (based on SCr of 1.9 mg/dL (H)). according to latest data. Based on current GFR, CKD stage is stage 4 - GFR 15-29.  Monitor UOP and serial BMP and adjust therapy as needed. Renally dose meds. Avoid nephrotoxic medications and procedures.    4/14 FM:  Will not DC tunneled cath just yet.  4/15 FM:  Patient is responding to lasix drip.  4/16 FM:  Slowly improving.  4/17 FM:  Improving with diuresis.  4/18 FM:  Cont to improve.  4/19 FM:  Home soon.  4/20 FM:  As above.  4/22 FM:  Labs noted, improved as we diuresis.  Hepatic  encephalopathy  4/14 FM:  Stable.  4/15 FM:  Cont current lactulose, adding xifaxin.  4/18 FM:  Cont to improve.  4/22 FM:  Much improved.    VTE Risk Mitigation (From admission, onward)           Ordered     IP VTE HIGH RISK PATIENT  Once         04/12/25 2327     Place sequential compression device  Until discontinued         04/12/25 2327     Place ANDREW hose  Until discontinued         04/12/25 2327                    Discharge Planning   AUTUMN:      Code Status: Full Code   Medical Readiness for Discharge Date:   Discharge Plan A: Home with family, Home Health                        Joao Villegas III, MD  Department of Hospital Medicine   [facility]

## 2025-04-23 NOTE — ASSESSMENT & PLAN NOTE
Patient has persistent (7 days or more) atrial fibrillation. Patient is currently in atrial fibrillation. [id number] Score: 1. The patients heart rate in the last 24 hours is as follows:  Pulse  Min: 77  Max: 82     Antiarrhythmics  metoprolol tartrate (LOPRESSOR) tablet 50 mg, 2 times daily, Oral    Anticoagulants       Plan  - Replete lytes with a goal of K>4, Mg >2  - Patient is not anticoagulated due to thrombocytopenia   - Patient's afib is currently uncontrolled. Will adjust treatment as follows: adjust lopressor dose  - cardiology consulted    4/14 FM:  No anticoags 2nd CLD, Plt Count.  4/15 FM:  Cont. Txt plan.

## 2025-04-23 NOTE — PROGRESS NOTES
[facility]  Cardiology  Progress Note    Patient Name: Juan Carlos Yoo Sr.  MRN: [id number]  Admission Date: 4/12/2025  Hospital Length of Stay: 9 days  Code Status: Full Code   Attending Physician: Joao Villegas III, MD   Primary Care Physician: Joao Villegas III, MD  Expected Discharge Date:   Principal Problem:Decompensated cirrhosis    Subjective:     Hospital Course:       Patient is a 70 yo male with a history of chronic atrial fibrillation, chronic diastolic heart failure, cirrhosis, and prior need for dialysis presenting with worsening bilateral lower extremity edema and scrotal swelling. Patient reports waking up recently with increased swelling that progressively worsened, particularly affecting his genitals, causing significant discomfort with ambulation. He presented to the ED yesterday and was admitted. He reports compliance with his medications including Lasix and spironolactone, though occasionally taking doses 1-2 hours late. He endorses mild intermittent exertional dyspnea, notably while walking to his truck, but denies orthopnea. Denies fever. Patient has a dialysis port scheduled for removal this Wednesday, with pre-op clearance obtained from cardiology last Friday. Recent echocardiogram in December showed preserved cardiac function with known atrial fibrillation. EKG from the 21st confirmed chronic A-fib. Patient denies palpitations or awareness of irregular rhythm. 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 Denies history of smoking or illicit drug use. No prior cardiac stents.    Interval History:     4/14/25: Patient reports improved shortness of breath. He reports stable scrotal and bilateral lower extremity edema. Telemetry this morning shows atrial fibrillation with a controlled rate. -156 mL urine output since yesterday. Hemodynamics well controlled overnight. Electrolytes stable this morning.  Renal function worsening since admission.     4/15/25:  Patient reports improved scrotal edema and bilateral lower extremity edema. He denies shortness of breath and chest pain. Telemetry this morning shows atrial fibrillation with a heart rate in the 80s- 90s. -2450 mL urine output recorded since yesterday. Hemodynamics well controlled overnight. Electrolytes stable. Renal function continuing to worsen with a creatinine of 2.2 and GFR of 31. Liver function stable.     4/16/25: Patient reports improved scrotal and bilateral lower extremity edema. He denies chest pain and shortness of breath. Telemetry this morning shows atrial fibrillation with a heart rate varying from 90s- 120s. -1900 mL urine output since yesterday. Hypotension noted this morning with a blood pressure of 99/50. Improving kidney function.      4/17/25: Patient continues to report improved scrotal and bilateral lower extremity edema. He denies chest pain and shortness of breath. Telemetry this morning shows atrial fibrillation with a rate in the 90s. -2500 mL urine output since yesterday. Hypotension noted overnight with blood pressure in the 90s/ 50s. Kidney function continuing to improve.      4/21/25: Lasix drip was transitioned to 80 mg IV BID over the weekend. Metolazone 10 mg daily added to patient's regimen by nephrologist. This morning, patient reports improved scrotal and BLE edema. Hemodynamics well controlled overnight. -3,000 mL urine output overnight. Liver function stable this morning.      4/22/25: Patient continues to report improving scrotal and bilateral lower extremity edema. -3100 mL urine output since yesterday on 80 mg IV furosemide q8h and metolazone 10 mg daily. Hypokalmia and hypomagnesemia noted this morning; otherwise, electrolytes stable. Liver function stable as well.     4/23/25: Improving scrotal edema and bilateral lower extremity edema. -5800 mL urine output since yesterday. Hemodynamics well controlled overnight.  Hypomagnesemia noted this morning; otherwise, electrolytes, kidney function, and liver function stable.     Review of Systems   Constitutional: Negative for chills and fever.   HENT: Negative.     Cardiovascular:  Positive for leg swelling. Negative for chest pain and palpitations.   Respiratory:  Negative for cough, shortness of breath and wheezing.    Gastrointestinal:  Positive for bloating. Negative for abdominal pain, nausea and vomiting.   Genitourinary:         Positive for scrotal edema     Objective:     Vital Signs (Most Recent):  Temp: 98 °F (36.7 °C) (04/23/25 1113)  Pulse: 89 (04/23/25 1113)  Resp: 18 (04/23/25 1113)  BP: (!) 111/56 (04/23/25 1113)  SpO2: 96 % (04/23/25 1113) Vital Signs (24h Range):  Temp:  [97.8 °F (36.6 °C)-98.4 °F (36.9 °C)] 98 °F (36.7 °C)  Pulse:  [77-91] 89  Resp:  [16-18] 18  SpO2:  [91 %-99 %] 96 %  BP: ()/(51-63) 111/56     Weight: 126 kg (277 lb 12.8 oz)  Body mass index is 36.65 kg/m².    SpO2: 96 %         Intake/Output Summary (Last 24 hours) at 4/23/2025 1544  Last data filed at 4/23/2025 0715  Gross per 24 hour   Intake 240 ml   Output 4150 ml   Net -3910 ml       Lines/Drains/Airways       Central Venous Catheter Line  Duration                  Hemodialysis Catheter 12/16/24 1152 right internal jugular 128 days              Drain  Duration                  Open Drain 02/27/25 0815 Tube - 1 Right Thigh Penrose 1/4 inch 55 days              Peripheral Intravenous Line  Duration                  Peripheral IV - Single Lumen 04/20/25 0030 20 G Anterior;Left Forearm 3 days                    Physical Exam  Constitutional:       General: He is not in acute distress.     Appearance: He is obese.   HENT:      Head: Normocephalic and atraumatic.   Eyes:      Extraocular Movements: Extraocular movements intact.   Cardiovascular:      Pulses: Normal pulses.      Heart sounds: Normal heart sounds.   Pulmonary:      Effort: Pulmonary effort is normal.      Breath sounds:  "Normal breath sounds.   Abdominal:      General: There is distension.   Musculoskeletal:      Cervical back: Normal range of motion and neck supple.      Right lower leg: Edema present.      Left lower leg: Edema present.   Skin:     General: Skin is warm and dry.      Capillary Refill: Capillary refill takes less than 2 seconds.   Neurological:      Mental Status: He is alert. Mental status is at baseline.   Psychiatric:         Mood and Affect: Mood normal.         Behavior: Behavior normal.         Significant Labs: BMP:   Recent Labs   Lab 04/22/25  0645 04/23/25  0546   * 134*   K 3.2* 3.7   CL 90* 88*   CO2 34* 38*   BUN 26* 28*   CREATININE 2.0* 1.9*   CALCIUM 8.7 8.8   MG 1.5* 1.4*   , CMP   Recent Labs   Lab 04/22/25  0645 04/23/25  0546   * 134*   K 3.2* 3.7   CL 90* 88*   CO2 34* 38*   BUN 26* 28*   CREATININE 2.0* 1.9*   CALCIUM 8.7 8.8   ALBUMIN 1.9* 1.9*   BILITOT 3.3* 3.0*   ALKPHOS 68 73   AST 28 28   ALT 9* 8*   ANIONGAP 9 8   , CBC   Recent Labs   Lab 04/22/25  0645 04/23/25  0546   WBC 5.18 5.44   HGB 9.8* 9.9*   HCT 29.6* 29.9*   PLT 79* 79*   , Lipid Panel No results for input(s): "CHOL", "HDL", "LDLCALC", "TRIG", "CHOLHDL" in the last 48 hours., Troponin No results for input(s): "TROPONINIHS" in the last 48 hours., and All pertinent lab results from the last 24 hours have been reviewed.    Significant Imaging:     Echocardiogram: Transthoracic echo (TTE) complete (Cupid Only):   Results for orders placed or performed during the hospital encounter of 04/12/25   Echo   Result Value Ref Range    BSA 2.65 m2    LVOT stroke volume 60.9 cm3    LVIDd 5.3 3.5 - 6.0 cm    LV Systolic Volume 58 mL    LV Systolic Volume Index 22.7 mL/m2    LVIDs 3.7 2.1 - 4.0 cm    LV Diastolic Volume 135 mL    LV Diastolic Volume Index 52.73 mL/m2    Left Ventricular End Systolic Volume by Teichholz Method 57.57 mL    Left Ventricular End Diastolic Volume by Teichholz Method 134.91 mL    IVS 1.1 0.6 - 1.1 cm "    LVOT diameter 2.1 cm    LVOT area 3.5 cm2    FS 30.2 28 - 44 %    Left Ventricle Relative Wall Thickness 0.34 cm    PW 0.9 0.6 - 1.1 cm    LV mass 200.4 g    LV Mass Index 78.3 g/m2    MV Peak E Owen 1.42 m/s    TDI LATERAL 0.11 m/s    TDI SEPTAL 0.13 m/s    E/E' ratio 12 m/s    TR Max Owen 3.2 m/s    E wave deceleration time 208 msec    LV SEPTAL E/E' RATIO 10.9 m/s    LV LATERAL E/E' RATIO 12.9 m/s    LVOT peak owen 0.8 m/s    Left Ventricular Outflow Tract Mean Velocity 0.57 cm/s    Left Ventricular Outflow Tract Mean Gradient 1.46 mmHg    RV- wilson basal diam 3.6 cm    RV/LV Ratio 0.68 cm    LA size 6.0 cm    Left Atrium Major Axis 7.3 cm    RA Major Axis 6.50 cm    AV mean gradient 4 mmHg    AV peak gradient 7 mmHg    Ao peak owen 1.3 m/s    Ao VTI 27.5 cm    LVOT peak VTI 17.6 cm    AV valve area 2.2 cm²    AV Velocity Ratio 0.62     AV index (prosthetic) 0.64     TONY by Velocity Ratio 2.1 cm²    Mr max owen 4.58 m/s    MV stenosis pressure 1/2 time 60.45 ms    MV valve area p 1/2 method 3.64 cm2    Triscuspid Valve Regurgitation Peak Gradient 41 mmHg    RVOT peak owen 0.58 m/s    Ao root annulus 3.39 cm    IVC diameter 3.29 cm    Mean e' 0.12 m/s    ZLVIDS -7.60     ZLVIDD -11.30     RVDD 3.60 cm    AORTIC VALVE CUSP SEPERATION 1.15 cm    TAPSE 1.80 cm    TV resting pulmonary artery pressure 56 mmHg    RV TB RVSP 18 mmHg    Est. RA pres 15 mmHg    Narrative      Left Ventricle: The left ventricle is normal in size. Mildly increased   wall thickness. There is normal systolic function with a visually   estimated ejection fraction of 55 - 60%. Unable to assess diastolic   function due to atrial fibrillation.    Right Ventricle: The right ventricle has moderate enlargement. Systolic   function is normal.    Left Atrium: Severely dilated    Right Atrium: Right atrium is moderately dilated.    Aortic Valve: The aortic valve is a trileaflet valve. There is mild   aortic valve sclerosis.    Mitral Valve: There is mild  regurgitation.    Tricuspid Valve: There is mild to moderate regurgitation.    IVC/SVC: Elevated venous pressure at 15 mmHg.    Left pleural effusion.          Assessment and Plan:    Acute exacerbation of HFpEF: Patient presented to Emergency Department with SOB, scrotal edema, and BLE edema. He was started on IV furosemide. This morning, he reports improving SOB. He also reports improving scrotal and BLE edema; however, not at baseline.             - Continue IV Lasix as dosed            - Continue metolazone as dosed             - Continue spironolactone as dosed            - Monitor daily I's & O's            - Low Na+ diet            - Echocardiogram shows normal LV systolic function with an EF of 55- 60%. It also shows mild MR and moderate TR.      Atrial Fibrillation: Patient with history of atrial fibrillation. HR well controlled overnight. He is not currently on anticoagulation given cirrhosis.            - Continue metoprolol tartrate as dosed           - Continue telemetry           - Agree with no anticoagulation given cirrhosis     Cirrhosis: Management per primary team          Active Diagnoses:    Diagnosis Date Noted POA    PRINCIPAL PROBLEM:  Decompensated cirrhosis [K72.90, K74.60] 10/04/2024 Yes    Scrotal edema [N50.89] 04/13/2025 Yes    Anasarca [R60.1] 04/13/2025 Yes    Thrombocytopenia [D69.6] 11/21/2024 Yes    Hepatic encephalopathy [K76.82] 11/21/2024 Yes    CKD (chronic kidney disease) stage 4, GFR 15-29 ml/min [N18.4] 11/20/2024 Yes    Longstanding persistent atrial fibrillation [I48.11] 11/29/2022 Yes    Cirrhosis [K74.60] 07/24/2014 Yes      Problems Resolved During this Admission:       VTE Risk Mitigation (From admission, onward)           Ordered     IP VTE HIGH RISK PATIENT  Once         04/12/25 2327     Place sequential compression device  Until discontinued         04/12/25 2327     Place ANDREW hose  Until discontinued         04/12/25 2327                  Provider's  Attestation:  I, Luis Liu MD, confirm that OCTAVIA Starr worked under my direction at all times.  Documentation shall reflect findings and decisions made by myself in the course of the patient's treatment.  I have performed a face to face evaluation of the patient and reviewed the medical record. In addition, I have performed the substantive portion of the medical decision making. I have reviewed the notes and assessment, and I concur with her documentation.  CMS guidelines regarding the use of scribes shall be adhered to.  MD Luis Lewis MD  Cardiology  [facility]

## 2025-04-23 NOTE — ASSESSMENT & PLAN NOTE
2/2 to cirrhosis, diuresis on board, replete electrolytes, monitor urine output    4/14 FM:  Stopping midrodine today, possible related to volume retention.  4/15 FM:  Slowly improving, need to establish lowest tolerable dry weight.  4/16 FM:  Slowly improving.  4/17 FM:  Cont to improve.  4/18 FM:  Cont to improve.  4/19 FM:  Home soon.  4/20 FM:  As above.  4/21 FM: Has responded to change in meds.   4/22 FM:  Much improved.  4/23 FM:  Cont to improve.

## 2025-04-23 NOTE — ASSESSMENT & PLAN NOTE
4/14 FM:  Not much change overnight, cont IV lasix drip.  4/15 FM:  Improving slowly, echo noted.  4/16 FM:  Slowly improving.  4/17 FM:  Much improved today.  4/18 FM:  Cont to improve.  4/19 FM:  Transitioning to pulse dosing of lasix.  4/20 FM:  Digressed, addding metolazone, may need UF.  4/21 FM:  Has responded to change in meds.  4/22 FM:  Much improved.  4/23 FM:  Cont to improve.

## 2025-04-24 VITALS
OXYGEN SATURATION: 91 % | TEMPERATURE: 98 F | DIASTOLIC BLOOD PRESSURE: 69 MMHG | HEIGHT: 73 IN | WEIGHT: 277.81 LBS | RESPIRATION RATE: 18 BRPM | BODY MASS INDEX: 36.82 KG/M2 | SYSTOLIC BLOOD PRESSURE: 120 MMHG | HEART RATE: 80 BPM

## 2025-04-24 DIAGNOSIS — K70.31 ALCOHOLIC CIRRHOSIS OF LIVER WITH ASCITES: Primary | ICD-10-CM

## 2025-04-24 LAB
ABSOLUTE EOSINOPHIL (OHS): 0.24 K/UL
ABSOLUTE MONOCYTE (OHS): 0.91 K/UL (ref 0.3–1)
ABSOLUTE NEUTROPHIL COUNT (OHS): 3.32 K/UL (ref 1.8–7.7)
ALBUMIN SERPL BCP-MCNC: 1.9 G/DL (ref 3.5–5.2)
ALP SERPL-CCNC: 80 UNIT/L (ref 40–150)
ALT SERPL W/O P-5'-P-CCNC: 9 UNIT/L (ref 10–44)
ANION GAP (OHS): 9 MMOL/L (ref 8–16)
AST SERPL-CCNC: 30 UNIT/L (ref 11–45)
BASOPHILS # BLD AUTO: 0.09 K/UL
BASOPHILS NFR BLD AUTO: 1.6 %
BILIRUB SERPL-MCNC: 3.1 MG/DL (ref 0.1–1)
BUN SERPL-MCNC: 32 MG/DL (ref 8–23)
CALCIUM SERPL-MCNC: 8.7 MG/DL (ref 8.7–10.5)
CHLORIDE SERPL-SCNC: 87 MMOL/L (ref 95–110)
CO2 SERPL-SCNC: 36 MMOL/L (ref 23–29)
CREAT SERPL-MCNC: 2.1 MG/DL (ref 0.5–1.4)
ERYTHROCYTE [DISTWIDTH] IN BLOOD BY AUTOMATED COUNT: 15.8 % (ref 11.5–14.5)
GFR SERPLBLD CREATININE-BSD FMLA CKD-EPI: 33 ML/MIN/1.73/M2
GLUCOSE SERPL-MCNC: 100 MG/DL (ref 70–110)
HCT VFR BLD AUTO: 29.1 % (ref 40–54)
HGB BLD-MCNC: 9.7 GM/DL (ref 14–18)
IMM GRANULOCYTES # BLD AUTO: 0.04 K/UL (ref 0–0.04)
IMM GRANULOCYTES NFR BLD AUTO: 0.7 % (ref 0–0.5)
LYMPHOCYTES # BLD AUTO: 1.03 K/UL (ref 1–4.8)
MAGNESIUM SERPL-MCNC: 1.6 MG/DL (ref 1.6–2.6)
MCH RBC QN AUTO: 30.8 PG (ref 27–31)
MCHC RBC AUTO-ENTMCNC: 33.3 G/DL (ref 32–36)
MCV RBC AUTO: 92 FL (ref 82–98)
NUCLEATED RBC (/100WBC) (OHS): 0 /100 WBC
PLATELET # BLD AUTO: 84 K/UL (ref 150–450)
PMV BLD AUTO: 12.1 FL (ref 9.2–12.9)
POTASSIUM SERPL-SCNC: 3.4 MMOL/L (ref 3.5–5.1)
PROT SERPL-MCNC: 6.2 GM/DL (ref 6–8.4)
RBC # BLD AUTO: 3.15 M/UL (ref 4.6–6.2)
RELATIVE EOSINOPHIL (OHS): 4.3 %
RELATIVE LYMPHOCYTE (OHS): 18.3 % (ref 18–48)
RELATIVE MONOCYTE (OHS): 16.2 % (ref 4–15)
RELATIVE NEUTROPHIL (OHS): 58.9 % (ref 38–73)
SODIUM SERPL-SCNC: 132 MMOL/L (ref 136–145)
WBC # BLD AUTO: 5.63 K/UL (ref 3.9–12.7)

## 2025-04-24 PROCEDURE — 80053 COMPREHEN METABOLIC PANEL: CPT | Performed by: INTERNAL MEDICINE

## 2025-04-24 PROCEDURE — 25000003 PHARM REV CODE 250: Performed by: EMERGENCY MEDICINE

## 2025-04-24 PROCEDURE — 36415 COLL VENOUS BLD VENIPUNCTURE: CPT | Performed by: INTERNAL MEDICINE

## 2025-04-24 PROCEDURE — 25000003 PHARM REV CODE 250: Performed by: INTERNAL MEDICINE

## 2025-04-24 PROCEDURE — 85025 COMPLETE CBC W/AUTO DIFF WBC: CPT | Performed by: INTERNAL MEDICINE

## 2025-04-24 PROCEDURE — 83735 ASSAY OF MAGNESIUM: CPT | Performed by: INTERNAL MEDICINE

## 2025-04-24 RX ORDER — METOLAZONE 5 MG/1
5 TABLET ORAL DAILY
Status: DISCONTINUED | OUTPATIENT
Start: 2025-04-25 | End: 2025-04-24 | Stop reason: HOSPADM

## 2025-04-24 RX ORDER — SPIRONOLACTONE 50 MG/1
50 TABLET, FILM COATED ORAL 2 TIMES DAILY
Qty: 180 TABLET | Refills: 1 | Status: SHIPPED | OUTPATIENT
Start: 2025-04-24 | End: 2025-05-01 | Stop reason: SDUPTHER

## 2025-04-24 RX ORDER — METOPROLOL TARTRATE 50 MG/1
50 TABLET ORAL 2 TIMES DAILY
Qty: 180 TABLET | Refills: 1 | Status: ON HOLD | OUTPATIENT
Start: 2025-04-24 | End: 2025-05-07

## 2025-04-24 RX ORDER — METOLAZONE 5 MG/1
5 TABLET ORAL DAILY
Qty: 90 TABLET | Refills: 1 | Status: SHIPPED | OUTPATIENT
Start: 2025-04-25 | End: 2025-05-01 | Stop reason: SDUPTHER

## 2025-04-24 RX ORDER — FUROSEMIDE 80 MG/1
80 TABLET ORAL 2 TIMES DAILY
Qty: 180 TABLET | Refills: 1 | Status: SHIPPED | OUTPATIENT
Start: 2025-04-24 | End: 2025-05-01 | Stop reason: SDUPTHER

## 2025-04-24 RX ORDER — RIFAXIMIN 550 MG/1
550 TABLET ORAL 2 TIMES DAILY
Qty: 180 TABLET | Refills: 1 | OUTPATIENT
Start: 2025-04-24

## 2025-04-24 RX ADMIN — TAMSULOSIN HYDROCHLORIDE 0.4 MG: 0.4 CAPSULE ORAL at 09:04

## 2025-04-24 RX ADMIN — FAMOTIDINE 20 MG: 20 TABLET, FILM COATED ORAL at 09:04

## 2025-04-24 RX ADMIN — POTASSIUM BICARBONATE 20 MEQ: 391 TABLET, EFFERVESCENT ORAL at 09:04

## 2025-04-24 RX ADMIN — RIFAXIMIN 550 MG: 550 TABLET ORAL at 09:04

## 2025-04-24 RX ADMIN — Medication 100 MG: at 09:04

## 2025-04-24 RX ADMIN — METOPROLOL TARTRATE 50 MG: 50 TABLET, FILM COATED ORAL at 09:04

## 2025-04-24 RX ADMIN — MAGNESIUM 64 MG (MAGNESIUM CHLORIDE) TABLET,DELAYED RELEASE 128 MG: at 09:04

## 2025-04-24 RX ADMIN — LACTULOSE 20 G: 20 SOLUTION ORAL at 09:04

## 2025-04-24 RX ADMIN — SPIRONOLACTONE 50 MG: 25 TABLET ORAL at 09:04

## 2025-04-24 NOTE — PROGRESS NOTES
[facility]  Cardiology  Progress Note    Patient Name: Juan Carlos Yoo Sr.  MRN: [id number]  Admission Date: 4/12/2025  Hospital Length of Stay: 10 days  Code Status: Prior   Attending Physician: No att. providers found   Primary Care Physician: Joao Villegas III, MD  Expected Discharge Date: 4/24/2025  Principal Problem:Decompensated cirrhosis    Subjective:     Hospital Course:       Patient is a 70 yo male with a history of chronic atrial fibrillation, chronic diastolic heart failure, cirrhosis, and prior need for dialysis presenting with worsening bilateral lower extremity edema and scrotal swelling. Patient reports waking up recently with increased swelling that progressively worsened, particularly affecting his genitals, causing significant discomfort with ambulation. He presented to the ED yesterday and was admitted. He reports compliance with his medications including Lasix and spironolactone, though occasionally taking doses 1-2 hours late. He endorses mild intermittent exertional dyspnea, notably while walking to his truck, but denies orthopnea. Denies fever. Patient has a dialysis port scheduled for removal this Wednesday, with pre-op clearance obtained from cardiology last Friday. Recent echocardiogram in December showed preserved cardiac function with known atrial fibrillation. EKG from the 21st confirmed chronic A-fib. Patient denies palpitations or awareness of irregular rhythm. 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 Denies history of smoking or illicit drug use. No prior cardiac stents.    Interval History:     4/14/25: Patient reports improved shortness of breath. He reports stable scrotal and bilateral lower extremity edema. Telemetry this morning shows atrial fibrillation with a controlled rate. -156 mL urine output since yesterday. Hemodynamics well controlled overnight. Electrolytes stable this  morning. Renal function worsening since admission.     4/15/25:  Patient reports improved scrotal edema and bilateral lower extremity edema. He denies shortness of breath and chest pain. Telemetry this morning shows atrial fibrillation with a heart rate in the 80s- 90s. -2450 mL urine output recorded since yesterday. Hemodynamics well controlled overnight. Electrolytes stable. Renal function continuing to worsen with a creatinine of 2.2 and GFR of 31. Liver function stable.     4/16/25: Patient reports improved scrotal and bilateral lower extremity edema. He denies chest pain and shortness of breath. Telemetry this morning shows atrial fibrillation with a heart rate varying from 90s- 120s. -1900 mL urine output since yesterday. Hypotension noted this morning with a blood pressure of 99/50. Improving kidney function.      4/17/25: Patient continues to report improved scrotal and bilateral lower extremity edema. He denies chest pain and shortness of breath. Telemetry this morning shows atrial fibrillation with a rate in the 90s. -2500 mL urine output since yesterday. Hypotension noted overnight with blood pressure in the 90s/ 50s. Kidney function continuing to improve.      4/21/25: Lasix drip was transitioned to 80 mg IV BID over the weekend. Metolazone 10 mg daily added to patient's regimen by nephrologist. This morning, patient reports improved scrotal and BLE edema. Hemodynamics well controlled overnight. -3,000 mL urine output overnight. Liver function stable this morning.      4/22/25: Patient continues to report improving scrotal and bilateral lower extremity edema. -3100 mL urine output since yesterday on 80 mg IV furosemide q8h and metolazone 10 mg daily. Hypokalmia and hypomagnesemia noted this morning; otherwise, electrolytes stable. Liver function stable as well.     4/23/25: Improving scrotal edema and bilateral lower extremity edema. -5800 mL urine output since yesterday. Hemodynamics well controlled  overnight. Hypomagnesemia noted this morning; otherwise, electrolytes, kidney function, and liver function stable.     4/24/25: Improving scrotal edema and bilateral lower extremity edema. -610 mL urine output since yesterday. Hemodynamics are well controlled overnight. No significant electrolyte or liver function abnormalities noted. Kidney function worsening today. Discussed patient with nephrology yesterday.     Review of Systems   Constitutional: Negative for chills and fever.   HENT: Negative.     Cardiovascular:  Positive for leg swelling. Negative for chest pain and palpitations.   Respiratory:  Negative for cough, shortness of breath and wheezing.    Gastrointestinal:  Positive for bloating. Negative for abdominal pain, nausea and vomiting.   Genitourinary:         Positive for scrotal edema     Objective:     Vital Signs (Most Recent):  Temp: 97.6 °F (36.4 °C) (04/24/25 0829)  Pulse: 80 (04/24/25 1100)  Resp: 18 (04/24/25 0829)  BP: 120/69 (04/24/25 1100)  SpO2: (!) 91 % (04/24/25 0829) Vital Signs (24h Range):        Weight: 126 kg (277 lb 12.8 oz)  Body mass index is 36.65 kg/m².    SpO2: (!) 91 %       No intake or output data in the 24 hours ending 04/29/25 2133      Lines/Drains/Airways       Central Venous Catheter Line  Duration                  Hemodialysis Catheter 12/16/24 1152 right internal jugular 134 days              Drain  Duration                  Open Drain 02/27/25 0815 Tube - 1 Right Thigh Penrose 1/4 inch 61 days                    Physical Exam  Constitutional:       General: He is not in acute distress.     Appearance: He is obese.   HENT:      Head: Normocephalic and atraumatic.   Eyes:      Extraocular Movements: Extraocular movements intact.   Cardiovascular:      Pulses: Normal pulses.      Heart sounds: Normal heart sounds.   Pulmonary:      Effort: Pulmonary effort is normal.      Breath sounds: Normal breath sounds.   Abdominal:      General: There is distension.  "  Musculoskeletal:      Cervical back: Normal range of motion and neck supple.      Right lower leg: Edema present.      Left lower leg: Edema present.   Skin:     General: Skin is warm and dry.      Capillary Refill: Capillary refill takes less than 2 seconds.   Neurological:      Mental Status: He is alert. Mental status is at baseline.   Psychiatric:         Mood and Affect: Mood normal.         Behavior: Behavior normal.         Significant Labs:     BMP:   No results for input(s): "GLU", "NA", "K", "CL", "CO2", "BUN", "CREATININE", "CALCIUM", "MG" in the last 48 hours.    CMP   No results for input(s): "NA", "K", "CL", "CO2", "GLU", "BUN", "CREATININE", "CALCIUM", "PROT", "ALBUMIN", "BILITOT", "ALKPHOS", "AST", "ALT", "ANIONGAP", "ESTGFRAFRICA", "EGFRNONAA" in the last 48 hours.    CBC   No results for input(s): "WBC", "HGB", "HCT", "PLT" in the last 48 hours.  , Lipid Panel No results for input(s): "CHOL", "HDL", "LDLCALC", "TRIG", "CHOLHDL" in the last 48 hours., Troponin No results for input(s): "TROPONINIHS" in the last 48 hours., and All pertinent lab results from the last 24 hours have been reviewed.    Significant Imaging:     Echocardiogram: Transthoracic echo (TTE) complete (Cupid Only):   Results for orders placed or performed during the hospital encounter of 04/12/25   Echo   Result Value Ref Range    BSA 2.65 m2    LVOT stroke volume 60.9 cm3    LVIDd 5.3 3.5 - 6.0 cm    LV Systolic Volume 58 mL    LV Systolic Volume Index 22.7 mL/m2    LVIDs 3.7 2.1 - 4.0 cm    LV Diastolic Volume 135 mL    LV Diastolic Volume Index 52.73 mL/m2    Left Ventricular End Systolic Volume by Teichholz Method 57.57 mL    Left Ventricular End Diastolic Volume by Teichholz Method 134.91 mL    IVS 1.1 0.6 - 1.1 cm    LVOT diameter 2.1 cm    LVOT area 3.5 cm2    FS 30.2 28 - 44 %    Left Ventricle Relative Wall Thickness 0.34 cm    PW 0.9 0.6 - 1.1 cm    LV mass 200.4 g    LV Mass Index 78.3 g/m2    MV Peak E Owen 1.42 m/s    " TDI LATERAL 0.11 m/s    TDI SEPTAL 0.13 m/s    E/E' ratio 12 m/s    TR Max Owen 3.2 m/s    E wave deceleration time 208 msec    LV SEPTAL E/E' RATIO 10.9 m/s    LV LATERAL E/E' RATIO 12.9 m/s    LVOT peak owen 0.8 m/s    Left Ventricular Outflow Tract Mean Velocity 0.57 cm/s    Left Ventricular Outflow Tract Mean Gradient 1.46 mmHg    RV- wilson basal diam 3.6 cm    RV/LV Ratio 0.68 cm    LA size 6.0 cm    Left Atrium Major Axis 7.3 cm    RA Major Axis 6.50 cm    AV mean gradient 4 mmHg    AV peak gradient 7 mmHg    Ao peak owen 1.3 m/s    Ao VTI 27.5 cm    LVOT peak VTI 17.6 cm    AV valve area 2.2 cm²    AV Velocity Ratio 0.62     AV index (prosthetic) 0.64     TONY by Velocity Ratio 2.1 cm²    Mr max owen 4.58 m/s    MV stenosis pressure 1/2 time 60.45 ms    MV valve area p 1/2 method 3.64 cm2    Triscuspid Valve Regurgitation Peak Gradient 41 mmHg    RVOT peak owen 0.58 m/s    Ao root annulus 3.39 cm    IVC diameter 3.29 cm    Mean e' 0.12 m/s    ZLVIDS -7.60     ZLVIDD -11.30     RVDD 3.60 cm    AORTIC VALVE CUSP SEPERATION 1.15 cm    TAPSE 1.80 cm    TV resting pulmonary artery pressure 56 mmHg    RV TB RVSP 18 mmHg    Est. RA pres 15 mmHg    Narrative      Left Ventricle: The left ventricle is normal in size. Mildly increased   wall thickness. There is normal systolic function with a visually   estimated ejection fraction of 55 - 60%. Unable to assess diastolic   function due to atrial fibrillation.    Right Ventricle: The right ventricle has moderate enlargement. Systolic   function is normal.    Left Atrium: Severely dilated    Right Atrium: Right atrium is moderately dilated.    Aortic Valve: The aortic valve is a trileaflet valve. There is mild   aortic valve sclerosis.    Mitral Valve: There is mild regurgitation.    Tricuspid Valve: There is mild to moderate regurgitation.    IVC/SVC: Elevated venous pressure at 15 mmHg.    Left pleural effusion.          Assessment and Plan:    Acute exacerbation of HFpEF:  Patient presented to Emergency Department with SOB, scrotal edema, and BLE edema. He was started on IV furosemide. This morning, he reports improving SOB. He also reports improving scrotal and BLE edema; however, not at baseline.  We spoke with patient's nephrologist yesterday. Recommended transitioning to oral Lasix and decreasing metolazone dosage. Patient will need close follow- up with nephroloogy upon discharge.             - Transition to PO furosemide 80 mg BID            - Decrease metolazone to 5 mg daily            - Continue spironolactone as dosed            - Monitor daily I's & O's            - Low Na+ diet            - Echocardiogram shows normal LV systolic function with an EF of 55- 60%. It also shows mild MR and moderate TR.             - Close follow- up with nephrology upon discharge            - Close follow- up with cardiology upon discharge     Atrial Fibrillation: Patient with history of atrial fibrillation. HR well controlled overnight. He is not currently on anticoagulation given cirrhosis.            - Continue metoprolol tartrate as dosed           - Continue telemetry           - Agree with no anticoagulation given cirrhosis     Cirrhosis: Management per primary team          Active Diagnoses:    Diagnosis Date Noted POA    PRINCIPAL PROBLEM:  Decompensated cirrhosis [K72.90, K74.60] 10/04/2024 Yes    Scrotal edema [N50.89] 04/13/2025 Yes    Anasarca [R60.1] 04/13/2025 Yes    Thrombocytopenia [D69.6] 11/21/2024 Yes    Hepatic encephalopathy [K76.82] 11/21/2024 Yes    CKD (chronic kidney disease) stage 4, GFR 15-29 ml/min [N18.4] 11/20/2024 Yes    Longstanding persistent atrial fibrillation [I48.11] 11/29/2022 Yes    Cirrhosis [K74.60] 07/24/2014 Yes      Problems Resolved During this Admission:       VTE Risk Mitigation (From admission, onward)           Ordered     IP VTE HIGH RISK PATIENT  Once         04/12/25 9023                  Provider's Attestation:  Luis GANDHI MD,  confirm that OCTAVIA Starr worked under my direction at all times.  Documentation shall reflect findings and decisions made by myself in the course of the patient's treatment.  I have performed a face to face evaluation of the patient and reviewed the medical record. In addition, I have performed the substantive portion of the medical decision making. I have reviewed the notes and assessment, and I concur with her documentation.  CMS guidelines regarding the use of scribes shall be adhered to.  MD Luis Lewis MD  Cardiology  [facility]

## 2025-04-24 NOTE — PLAN OF CARE
[facility]  Discharge Final Note    Primary Care Provider: Joao Villegas III, MD    Expected Discharge Date: 4/24/2025    Final Discharge Note (most recent)       Final Note - 04/24/25 0935          Final Note    Assessment Type Final Discharge Note     Anticipated Discharge Disposition [place]Health Care [facility] [place]     Hospital Resources/Appts/Education Provided Appointments scheduled and added to AVS        Post-Acute Status    Post-Acute Authorization Home Health     Home Health Status Set-up Complete/Auth obtained     Coverage BCBS     Discharge Delays None known at this time                     Important Message from Medicare             Contact Info       Joao Villegas III, MD   Specialty: Internal Medicine   Relationship: PCP - General    [address]  [facility] [address]   Phone: [phone]       Next Steps: Follow up in 1 week(s)    [facility]    [address]   [facility] [address]   Phone: [phone]       Next Steps: Follow up    Instructions: as scheduled          The patient is currently receiving home health care services through [facility]. The patient reports that his son will be picking him up today. He was given a copy of Home Nursing Preferences options list. He signed the Home Nursing Preferences list of providers stating that he plans to resume care with Home Nursing Preferences.

## 2025-04-24 NOTE — TELEPHONE ENCOUNTER
Physical Therapy Evaluation    Visit Count: 1  Plan of Care Dates: Initial: 5/7/2018 Through: 6/18/2018  Insurance Information: 1)  Per real time response in Epic, the patient has Medicare Part A and B effective 10/01/2004    2) The patient also has supplemental Medicare insurance through [employer].    Next Referring Provider Visit: 5/15/18    Referred by: Luis Felipe Han DO  Medical Diagnosis (from order):  Primary osteoarthritis of right knee [M17.11]   Treatment Diagnosis: Knee Symptoms with Pain, Impaired Joint Mobility, Impaired Range of Motion, Impaired Gait/Locomotion Deficits, Impaired Mobility and Impaired Balance  Insurance: 1. MEDICARE  2. [employer]    Date of Onset: 5/2/18   Diagnosis Precautions: Weight Bearing As Tolerated Right Lower Extremity  Chart reviewed: Relevant co-morbidities, allergies, tests and medications: Reviewed     SUBJECTIVE   Went in to the ER last night d/t concern over possible DVT but was ruled out.  Was d/c'ed home the following day from the hospital.  Patient states that he went home with walker, but upon getting home switched to the cane right away.  Prior to surgery was having pain and difficulty with walking, going up/down steps and static stding. Patient states that he is sleeping in bed and getting about 6-7 hrs during the night.     Pain:  Intensity: Now: 1/10; Best: 1/10; Worst: 9/10 (in the last 2 weeks)  Location: R knee  Quality/Description: Sharp, Shooting, Hot  Relieving/Alleviating factors: rest, ice, prescribed medication, over the counter medication    Function:  Limitations and exacerbating factors (patient reported): pain, difficulty with all activities/tasks with involved extremity  Prior level (patient reported): independent with all activities of daily living and instrumental activities of daily living, ambulating with standard cane    Prior Treatment: inpatient physical therapy and inpatient occupational therapy in the past year for current  Stay on the lactulose.   condition. Hospitalization, home health services or skilled nursing facility in the last 30 days: Yes, per patient.    Home Environment/Social Support: Patient lives with significant other, in a 1 story home, needs to complete stairs for home entry and laundry completion.  Patient has consistent assist from family/friends.      Safety:  Do you feel safe at home, work and/or school? yes, per patient  Falls: balance history in last year (< or equal to 2 falls/near falls and/or reasons) does not indicate further testing    Patient Goals/Concerns:  Ambulate without a cane    OBJECTIVE   Posture/Observation:  Patient arrives to therapy today using SPC for gait in R hand.  Has slight fw leaning trunk and is very large man with significant panus.  Patient bandage is intact, clear of any drainage at this time. No significant redness noted currently around the knee jt, but does have significant amount of bruising especially along medial, proximal thigh.  Patient also has several blisters intact along lateral bandage at jt line, distal and medial to knee jt and 1 across top of the foot that is slightly larger than a 50 cent piece.    Gait Analysis:  Ambulates with antalgic step to and step through pattern, spc in R hand, decreased step length R-L with less stance time. Instructed pt to switch cane to the L hand while walking and raised it up 1 notch to improve posture.    Range of Motion (degrees) Norm Left Right   Date  Initial Initial   Knee Flexion 135  119 61   Knee Extension 0-5 0 -8   standard testing positions unless otherwise noted; Key: ranges are reported in active range of motion unless noted as AA=active assistive or P=passive range of motion, * denotes pain   Comments: Only those motions that were assessed are noted.  Patient quad set is present with good active superior glide of patella.    Strength (out of 5) Left Right   Date Initial Initial   Hip Flexion     Hip Extension     Hip Abduction     Hip Glut Medius      Hip Adduction      Hip Internal Rotation     Hip External Rotation     Knee Flexion     Knee Extension     Ankle Dorsiflexion     Ankle Plantar flexion     Ankle Inversion     Ankle Eversion     standard testing positions unless otherwise noted,* denotes pain  Comments: Not tested at this time d/t post-surgical status       Palpation:  Edema throughout leg is still soft and mobile not hard at this time.    Joint Play Assessment:  Patient has good patellar ROM all directions.    Knee Circumference: (cm)   Left Right   10 cm Proximal  50.0 55.5   5 cm Proximal 47.0 55.0   Joint Line         45.2 51.5   5 cm Distal 42.6 45.6   10 cm Distal 43.5 46.2   Comments:     Outcome Measures:   KOOS jr: 44 (0=total knee disability to 100=perfect knee health)    Initial Treatment   Initial evaluation completed.    Therapeutic Exercise:   Exercise 5/7/18     AP* daily     Quad sets* 10x5\"     Hs sets* 10x5\"     Heel slides* 10x     Supine hip abd* 10x     saq* 10x     slr AA 7x                 Comments: Patient instructed that he can use leg  for assist along with green sheet to increase ease of exercises.  Patient also instructed to elevate and ice as needed throughout the day.  Issued size G tubi  for edema management.    Manual Therapy:   Manual edema mobilizations proximal/lateral>distal for opening lymph vessels, then working distal to proximal and lateral with instruction for home manual tx which pt verbalized understanding.    Vasopneumatic Compression / Game Ready (75741):  Patient has been made aware of potential contraindications and possible risks associated with the use of modality and has agreed.  Location: R knee  Position: supine with leg(s) elevated Compression: low  Water temperature 44 °F  Duration: 10 minutes   Results: decreased pain; no adverse reaction to treatment    Initial Home Program:  * above denotes home program issuance as well    Plan for next session: progress range, strength and edema  management    ASSESSMENT   76 year old male presents to therapy with significant decline in prior level of function due to signs and symptoms consistent with pain, decreased range, strength and altered gait for which he would benefit from skilled PT.    Outcome:    Benefit from skilled therapy: yes   Rehab potential is good due to positive factors motivation level, activity tolerance, improvement in symptoms since onset, post - surgical  and negative factors not apparent at this time    Predicted patient presentation: stable and/or uncomplicated characteristics   Plan of care to increase strength/stability, increase range of motion, decrease pain, improve balance/proprioception, improve joint mobility, improve safety at home and in community, improve activity tolerance, improve quality of gait, improve activities of daily living and instrumental activites of daily living to address functional limitations listed above.    Goals:  To be obtained by end of this plan of care:  1. Patient independent with modified and progressed home exercise program.  2. Patient will decrease involved knee pain/symptoms to 1/10  to aid in community ambulation for activities of independent living.   3. Patient will increase involved knee active range of motion to 120° to aid in stair ambulation.   4. Patient will increase involved knee strength to 4+/5 to aid in normalization of gait for independent living.  5. Patient will ambulate community distances on even and uneven terrain with normal gait pattern without assistive device and without loss of balance.  6. Patient will be able to ascend and descend 1 flight of stairs using reciprocal pattern with minimal pain/difficulty.  7. Patient will be able to tolerate standing activities for greater than or equal to 30 minutes with minimal pain/difficulty    PLAN   Frequency/Duration: 2 times per week for 6 weeks with tapering as the patient progresses  Skilled training and instruction for the  following interventions:  Gait Training (18750)  Manual Therapy (54080)  Neuromuscular Re-Education (88487)  Therapeutic Exercise (82705)  Heat/Cold (82386)    The plan of care and goals were established with the patient who concurs.  Patient has been given attendance policy at time of initial evaluation.    Patient Education:  Who will be receiving education: patient  Are they ready to learn: yes  Preferred learning style: written, verbal, demonstration  Barriers to learning: no barriers apparent at this time   Result of initial outlined education: Verbalizes understanding and Demonstrates understanding    THERAPY DAILY BILLING   Primary Insurance: MEDICARE  Secondary Insurance: [employer]    Evaluation Procedures:  Physical Therapy Evaluation: Low Complexity    Timed Procedures:  Manual Therapy, 5 minutes  Therapeutic Exercise, 25 minutes    Untimed Procedures:  Vasopneumatic Device    Total Treatment Time: 65 minutes    G-Code:  G-Code Score ABN form  Eval/Re-eval: report current and goal status with C modifiers   : Current Mobility Limitation,  CK - 40% to 59% impaired, limited or restricted  : Goal Mobility Limitation,  CJ - 20% to 39% impaired, limited or restricted  Modifier based on outcome measure(s)/functional testing/clinical judgement as listed above    The referring provider's electronic or written signature on the evaluation authorizes the therapy plan of care and certifies the need for these services, furnished under this plan of care while under their care.  Electronically sent for physician signature

## 2025-04-24 NOTE — ASSESSMENT & PLAN NOTE
Patient with known Cirrhosis with Child's class Calculator for Child's score link- https://www.[employer].com/calc/340/child-ocampo-score-cirrhosis-mortality#creator-insights. Co-morbidities are present and inclusive of ascites and anemia/pancytopenia.  MELD-Na score calculated; MELD 3.0: 27 at 4/15/2025  5:36 AM  MELD-Na: 26 at 4/15/2025  5:36 AM  Calculated from:  Serum Creatinine: 2.2 mg/dL at 4/15/2025  5:36 AM  Serum Sodium: 135 mmol/L at 4/15/2025  5:36 AM  Total Bilirubin: 3.5 mg/dL at 4/15/2025  5:36 AM  Serum Albumin: 2 g/dL at 4/15/2025  5:36 AM  INR(ratio): 1.7 at 4/13/2025 12:42 PM  Age at listing (hypothetical): 69 years  Sex: Male at 4/15/2025  5:36 AM      Continue chronic meds. Etiology likely ETOH. Will avoid any hepatotoxic meds, and monitor CBC/CMP/INR for synthetic function.   Patient reported     4/15 FM:  DC above, stopping midrodine.

## 2025-04-24 NOTE — DISCHARGE INSTRUCTIONS
Our goal at [facility] is to always give you outstanding care and exceptional service. You may receive a survey from [employer] by mail, text or e-mail in the next 24-48 hours asking about the care you received with us. The survey should only take 5-10 minutes to complete and is very important to us.     Your feedback provides us with a way to recognize our staff who work tirelessly to provide the best care! Also, your responses help us learn how to improve when your experience was below our aspiration of excellence. We are always looking for ways to improve your stay. We WILL use your feedback to continue making improvements to help us provide the highest quality care. We keep your personal information and feedback confidential. We appreciate your time completing this survey and can't wait to hear from you!!!    We look forward to your continued care with us! Thanks so much for choosing [facility] for your healthcare needs!

## 2025-04-24 NOTE — NURSING
Discharge instructions reviewed with the patient via the virtual discharge nurse. Patient is awaiting on transportation for home. Patient is trying to call the son for a ride home.

## 2025-04-24 NOTE — ASSESSMENT & PLAN NOTE
Patient has persistent (7 days or more) atrial fibrillation. Patient is currently in atrial fibrillation. [id number] Score: 1. The patients heart rate in the last 24 hours is as follows:  Pulse  Min: 76  Max: 83     Antiarrhythmics  metoprolol tartrate (LOPRESSOR) tablet 50 mg, 2 times daily, Oral  metoprolol tartrate (LOPRESSOR) tablet, 2 times daily, Oral    Anticoagulants       Plan  - Replete lytes with a goal of K>4, Mg >2  - Patient is not anticoagulated due to thrombocytopenia   - Patient's afib is currently uncontrolled. Will adjust treatment as follows: adjust lopressor dose  - cardiology consulted    4/14 FM:  No anticoags 2nd CLD, Plt Count.  4/15 FM:  Cont. Txt plan.

## 2025-04-24 NOTE — ASSESSMENT & PLAN NOTE
Creatine stable for now. BMP reviewed- noted Estimated Creatinine Clearance: 44.9 mL/min (A) (based on SCr of 2.1 mg/dL (H)). according to latest data. Based on current GFR, CKD stage is stage 4 - GFR 15-29.  Monitor UOP and serial BMP and adjust therapy as needed. Renally dose meds. Avoid nephrotoxic medications and procedures.    4/14 FM:  Will not DC tunneled cath just yet.  4/15 FM:  Patient is responding to lasix drip.  4/16 FM:  Slowly improving.  4/17 FM:  Improving with diuresis.  4/18 FM:  Cont to improve.  4/19 FM:  Home soon.  4/20 FM:  As above.  4/22 FM:  Labs noted, improved as we diuresis.

## 2025-04-24 NOTE — DISCHARGE SUMMARY
[facility] Medicine  Discharge Summary      Patient Name: Juan Carlos Yoo Sr.  MRN: [id number]  FREDIS: [id number]  Patient Class: IP- Inpatient  Admission Date: 4/12/2025  Hospital Length of Stay: 10 days  Discharge Date and Time: 04/24/2025 12:37 PM  Attending Physician: Joao Villegas III, MD   Discharging Provider: Joao Villegas III, MD  Primary Care Provider: Joao Villegas III, MD    Primary Care Team: Networked reference to record PCT     HPI:   Patient 69-year-old male history of AFib, cirrhosis, hypertension came to the ED with complaints of increased swelling to testicular region, lower extremity, patient had been seen previously for same issue, patient is on Lasix and Aldactone, increased pain due to swelling, associated orthopnea and dyspnea on exertion reported no nausea vomiting reported, patient denied any fever chills    * No surgery found *      Hospital Course:   4/14 FM:  Patient known to me, patient presented to the emergency department with an acute 6 lb weight gain severe painful scrotal edema and dyspnea.  Patient is on an IV Lasix drip with some response.  Scrotum is still massive today.  Patient is not taking anticoagulation because of his thrombocytopenia and cirrhosis cardiology has been consulted patient also had a planned surgical removal of his tunneled dialysis catheter in the right chest for this week which may be on hold depending on how he responds to treatment this admission.  I have contacted surgery.  4/15 FM:  Patient is responding to the Lasix drip and had decent urine output, patient's scrotal edema has reduced about 30%.  Patient's electrolytes noted we will begin replacing potassium and magnesium as we diurese patient's midodrine was discontinued and I have explained to him that he will likely have chronic hypotension and once we get to a adequate volume level we will see how he tolerates his blood pressures.  We will likely require a prolonged  hospitalization.  4/16 FM:  Patient is diuresing slowly, patient's weight has not changed much on measurement but question accuracy.  Patient's scrotal edema is improved some but still persist.  Patient's creatinine is down on IV Lasix drip, we will consult patient's nephrologist to follow along.  Leaving Vas-Cath in place for now.  Patient's getting out of bed and working with therapy which I have continued to encourage.  Midodrine is on hold which I do suspect was contributing to volume retention.  4/17 FM:  Patient's diuresing well and has had a significant improvement in the last 24 hours.  Patient's electrolytes noted continue repletion continue hospitalization and IV Lasix drip for another 24 hours.  We will likely transition to p.o. in a.m..  Mental status seems to be improved keep working with therapy.  4/18 FM:  Patient is continuing to diurese scrotal edema almost resolved patient's still has quite a bit of volume left to attempt remove blood pressure improved creatinine improved.  Continue to press forward with current treatment.  4/19 FM:  Patient has now been on a Lasix drip for the last 6 days, we will transitioned to pulse dosing and then p.o..  Patient has lost significant edema and fluid weight but still has a ways to go.  Can likely transition to p.o. and continue this at home.  4/20 FM:  Patient's diuresis has halted and he actually gained a 1 lb overnight.  I have reached out to the patient's nephrologist and she is adding metolazone daily and will ultrafiltrate if no progress tomorrow.  4/21 FM:  Patient had significant diuresis through the night with the addition of metolazone, patient's -3800 mL.  Patient's edema improved his weight is down we will continue with current medications renal assisting and following.  4/22 FM:  Patient diuresed another 3100 mL overnight.  Significant difference in exam today.  Continue current care plan and watch electrolytes and renal function.  4/23 FM:  Out 9L  last 24 hrs, great response to txt, decrease lasix to BID today, home in next couple of days.    Discharge Note:  Patient has had a significant diuresis over the last 3-4 days with the addition of high-dose metolazone.  Patient is now out about 11 L is scrotal edema has resolved and he is feeling better.  Patient's mobile he is getting up he is able to transport to the restroom and the toilet and able to care for himself at home.  We have re-educated the patient on current dosing of diuretics we would like him to record daily weights and vital signs please keep logs and we are attempting to get him to a dry weight closer to 250-255 lb.  Patient's still has significant edema and a ways to go.  We have decided to leave the patient's Vas-Cath in place for now as he may need it as his renal function has been labile as well as his urine output has varied significantly with medications and his cirrhosis.  Patient's encephalopathy is improved with current treatment we will continue in the home setting we will refer back to home health with PT OT nurses aides  and skilled nurse to assist him manage his chronic illnesses and to remain in the home setting.     Goals of Care Treatment Preferences:  Code Status: Full Code    Health care agent: Pt reports he has boni TREVINO and his son is MPOCECILIA.  Health care agent number: No value filed.          What is most important right now is to focus on remaining as independent as possible, symptom/pain control, extending life as long as possible, even it it means sacrificing quality, curative/life-prolongation (regardless of treatment burdens).  Accordingly, we have decided that the best plan to meet the patient's goals includes continuing with treatment.      SDOH Screening:  The patient was screened for utility difficulties, food insecurity, transport difficulties, housing insecurity, and interpersonal safety and there were no concerns identified this admission.      Consults:   Consults (From admission, onward)          Status Ordering Provider     Inpatient consult to Nephrology  Once        Provider:  Adali Garcia MD    Acknowledged HUEY READ III     Inpatient consult to General Surgery  Once        Provider:  Kayla Foster MD    Acknowledged HUEY READ III     Inpatient consult to Cardiology  Once        Provider:  Wil Whiting MD    Acknowledged NICOLETTE ARANA            Assessment & Plan  Decompensated cirrhosis  Patient with known Cirrhosis with Child's class Calculator for Child's score link- [email or web address]://[email or web address]/calc/340/child-ocampo-score-cirrhosis-mortality#creator-insights. Co-morbidities are present and inclusive of esophageal varices, hepatic encephalopathy, and anemia/pancytopenia.  MELD-Na score calculated; MELD 3.0: 27 at 4/15/2025  5:36 AM  MELD-Na: 26 at 4/15/2025  5:36 AM  Calculated from:  Serum Creatinine: 2.2 mg/dL at 4/15/2025  5:36 AM  Serum Sodium: 135 mmol/L at 4/15/2025  5:36 AM  Total Bilirubin: 3.5 mg/dL at 4/15/2025  5:36 AM  Serum Albumin: 2 g/dL at 4/15/2025  5:36 AM  INR(ratio): 1.7 at 4/13/2025 12:42 PM  Age at listing (hypothetical): 69 years  Sex: Male at 4/15/2025  5:36 AM      Continue chronic meds. Etiology likely ETOH. Will avoid any hepatotoxic meds, and monitor CBC/CMP/INR for synthetic function.     4/16 FM:  Slowly improving.  4/18 FM:  Cont to improve.  4/19 FM:  Home soon.  4/20 FM:  Has declined, see above and changes.  4/21 FM: Has responded to change in meds.   Cirrhosis  Patient with known Cirrhosis with Child's class Calculator for Child's score link- [email or web address]://[email or web address]/340/child-ocampo-score-cirrhosis-mortality#creator-insights. Co-morbidities are present and inclusive of ascites and anemia/pancytopenia.  MELD-Na score calculated; MELD 3.0: 27 at 4/15/2025  5:36 AM  MELD-Na: 26 at 4/15/2025  5:36 AM  Calculated from:  Serum Creatinine: 2.2 mg/dL at 4/15/2025  5:36 AM  Serum Sodium: 135  mmol/L at 4/15/2025  5:36 AM  Total Bilirubin: 3.5 mg/dL at 4/15/2025  5:36 AM  Serum Albumin: 2 g/dL at 4/15/2025  5:36 AM  INR(ratio): 1.7 at 4/13/2025 12:42 PM  Age at listing (hypothetical): 69 years  Sex: Male at 4/15/2025  5:36 AM      Continue chronic meds. Etiology likely ETOH. Will avoid any hepatotoxic meds, and monitor CBC/CMP/INR for synthetic function.   Patient reported     4/15 FM:  DC above, stopping midrodine.  Longstanding persistent atrial fibrillation  Patient has persistent (7 days or more) atrial fibrillation. Patient is currently in atrial fibrillation. [id number] Score: 1. The patients heart rate in the last 24 hours is as follows:  Pulse  Min: 76  Max: 83     Antiarrhythmics  metoprolol tartrate (LOPRESSOR) tablet 50 mg, 2 times daily, Oral  metoprolol tartrate (LOPRESSOR) tablet, 2 times daily, Oral    Anticoagulants       Plan  - Replete lytes with a goal of K>4, Mg >2  - Patient is not anticoagulated due to thrombocytopenia   - Patient's afib is currently uncontrolled. Will adjust treatment as follows: adjust lopressor dose  - cardiology consulted    4/14 FM:  No anticoags 2nd CLD, Plt Count.  4/15 FM:  Cont. Txt plan.  Thrombocytopenia  The likely etiology of thrombocytopenia is liver disease. The patients 3 most recent labs are listed below.  Recent Labs     04/22/25  0645 04/23/25  0546 04/24/25  0428   PLT 79* 79* 84*     Plan  - Will transfuse if platelet count is <20k.    Scrotal edema  4/14 FM:  Not much change overnight, cont IV lasix drip.  4/15 FM:  Improving slowly, echo noted.  4/16 FM:  Slowly improving.  4/17 FM:  Much improved today.  4/18 FM:  Cont to improve.  4/19 FM:  Transitioning to pulse dosing of lasix.  4/20 FM:  Digressed, addding metolazone, may need UF.  4/21 FM:  Has responded to change in meds.  4/22 FM:  Much improved.  4/23 FM:  Cont to improve.    Anasarca  2/2 to cirrhosis, diuresis on board, replete electrolytes, monitor urine output    4/14 FM:  Stopping  midrodine today, possible related to volume retention.  4/15 FM:  Slowly improving, need to establish lowest tolerable dry weight.  4/16 FM:  Slowly improving.  4/17 FM:  Cont to improve.  4/18 FM:  Cont to improve.  4/19 FM:  Home soon.  4/20 FM:  As above.  4/21 FM: Has responded to change in meds.   4/22 FM:  Much improved.  4/23 FM:  Cont to improve.  CKD (chronic kidney disease) stage 4, GFR 15-29 ml/min  Creatine stable for now. BMP reviewed- noted Estimated Creatinine Clearance: 44.9 mL/min (A) (based on SCr of 2.1 mg/dL (H)). according to latest data. Based on current GFR, CKD stage is stage 4 - GFR 15-29.  Monitor UOP and serial BMP and adjust therapy as needed. Renally dose meds. Avoid nephrotoxic medications and procedures.    4/14 FM:  Will not DC tunneled cath just yet.  4/15 FM:  Patient is responding to lasix drip.  4/16 FM:  Slowly improving.  4/17 FM:  Improving with diuresis.  4/18 FM:  Cont to improve.  4/19 FM:  Home soon.  4/20 FM:  As above.  4/22 FM:  Labs noted, improved as we diuresis.  Hepatic encephalopathy  4/14 FM:  Stable.  4/15 FM:  Cont current lactulose, adding xifaxin.  4/18 FM:  Cont to improve.  4/22 FM:  Much improved.    Final Active Diagnoses:    Diagnosis Date Noted POA    PRINCIPAL PROBLEM:  Decompensated cirrhosis [K72.90, K74.60] 10/04/2024 Yes    Scrotal edema [N50.89] 04/13/2025 Yes    Anasarca [R60.1] 04/13/2025 Yes    Thrombocytopenia [D69.6] 11/21/2024 Yes    Hepatic encephalopathy [K76.82] 11/21/2024 Yes    CKD (chronic kidney disease) stage 4, GFR 15-29 ml/min [N18.4] 11/20/2024 Yes    Longstanding persistent atrial fibrillation [I48.11] 11/29/2022 Yes    Cirrhosis [K74.60] 07/24/2014 Yes      Problems Resolved During this Admission:       Discharged Condition: stable    Disposition: Home-Health Care [facility]    Follow Up:   Follow-up Information       Joao Villegas III, MD Follow up on 5/1/2025.    Specialty: Internal Medicine  Why: at 11:20 am with Pilar Melgar,  NP  Contact information:  [address] [facility] [address]  [phone]               [facility] Follow up.    Why: as scheduled  Contact information:  [address]  [facility] [address]  [phone]                         Patient Instructions:      Diet Cardiac     Activity as tolerated       Significant Diagnostic Studies: N/A    Pending Diagnostic Studies:       None           Medications:  Reconciled Home Medications:      Medication List        START taking these medications      metOLazone 5 MG tablet  Commonly known as: ZAROXOLYN  Take 1 tablet (5 mg total) by mouth once daily. (May need to reduce once reach dry weight)  Start taking on: April 25, 2025     rifAXIMin 550 mg Tab  Commonly known as: XIFAXAN  Take 1 tablet (550 mg total) by mouth 2 (two) times daily.            CHANGE how you take these medications      furosemide 80 MG tablet  Commonly known as: LASIX  Take 1 tablet (80 mg total) by mouth 2 (two) times a day. (Once reach dry weight may need adjustment)  What changed: See the new instructions.     metoprolol tartrate 50 MG tablet  Commonly known as: LOPRESSOR  Take 1 tablet (50 mg total) by mouth 2 (two) times daily.  What changed:   medication strength  See the new instructions.     potassium bicarbonate disintegrating tablet  Commonly known as: K-LYTE  Take 1 tablet (25 mEq total) by mouth once daily.  What changed: when to take this     spironolactone 50 MG tablet  Commonly known as: ALDACTONE  Take 1 tablet (50 mg total) by mouth 2 (two) times daily.  What changed:   medication strength  how much to take            CONTINUE taking these medications      famotidine 20 MG tablet  Commonly known as: PEPCID  Take 1 tablet (20 mg total) by mouth once daily.     magnesium chloride 64 mg Tbec  Commonly known as: MAG 64  Take 2 tablets (128 mg total) by mouth once daily.     tamsulosin 0.4 mg Cap  Commonly known as: FLOMAX  Take 1 capsule (0.4 mg  total) by mouth once daily.     thiamine 100 MG tablet  Take 100 mg by mouth once daily.            STOP taking these medications      ELIQUIS 5 mg Tab  Generic drug: apixaban     midodrine 5 MG Tab  Commonly known as: PROAMATINE            ASK your doctor about these medications      * lactulose 20 gram/30 mL Soln  Commonly known as: CHRONULAC  Take 30 mLs (20 g total) by mouth 2 (two) times daily.  Ask about: Which instructions should I use?     * lactulose 20 gram/30 mL Soln  Commonly known as: CHRONULAC  Take 45 mLs (30 g total) by mouth 2 (two) times daily.  Ask about: Which instructions should I use?           * This list has 2 medication(s) that are the same as other medications prescribed for you. Read the directions carefully, and ask your doctor or other care provider to review them with you.                  Indwelling Lines/Drains at time of discharge:   Lines/Drains/Airways       Central Venous Catheter Line  Duration                  Hemodialysis Catheter 12/16/24 1152 right internal jugular 128 days              Drain  Duration                  Open Drain 02/27/25 0815 Tube - 1 Right Thigh Penrose 1/4 inch 56 days                    Time spent on the discharge of patient: 35 minutes         Jooa Villegas III, MD  Department of Hospital Medicine  [facility]

## 2025-04-24 NOTE — PLAN OF CARE
Renal      Pt known to me - HD stopped over a month ago due to stable creat. and urine output. Dialysis site care and dressing change done while inpt.  Pt responded to  IV diuretic regimen again this admission.  Plan to change IV diuretics to oral regimen d/w cardio and pt instructed to FU in CKD clinic within 2 weeks with labs to arrange removal of dialysis catheter.

## 2025-04-24 NOTE — ASSESSMENT & PLAN NOTE
The likely etiology of thrombocytopenia is liver disease. The patients 3 most recent labs are listed below.  Recent Labs     04/22/25  0645 04/23/25  0546 04/24/25  0428   PLT 79* 79* 84*     Plan  - Will transfuse if platelet count is <20k.

## 2025-04-24 NOTE — ASSESSMENT & PLAN NOTE
Patient with known Cirrhosis with Child's class Calculator for Child's score link- [email or web address]://[email or web address].com/calc/340/child-ocampo-score-cirrhosis-mortality#creator-insights. Co-morbidities are present and inclusive of esophageal varices, hepatic encephalopathy, and anemia/pancytopenia.  MELD-Na score calculated; MELD 3.0: 27 at 4/15/2025  5:36 AM  MELD-Na: 26 at 4/15/2025  5:36 AM  Calculated from:  Serum Creatinine: 2.2 mg/dL at 4/15/2025  5:36 AM  Serum Sodium: 135 mmol/L at 4/15/2025  5:36 AM  Total Bilirubin: 3.5 mg/dL at 4/15/2025  5:36 AM  Serum Albumin: 2 g/dL at 4/15/2025  5:36 AM  INR(ratio): 1.7 at 4/13/2025 12:42 PM  Age at listing (hypothetical): 69 years  Sex: Male at 4/15/2025  5:36 AM      Continue chronic meds. Etiology likely ETOH. Will avoid any hepatotoxic meds, and monitor CBC/CMP/INR for synthetic function.     4/16 FM:  Slowly improving.  4/18 FM:  Cont to improve.  4/19 FM:  Home soon.  4/20 FM:  Has declined, see above and changes.  4/21 FM: Has responded to change in meds.

## 2025-04-25 ENCOUNTER — PATIENT OUTREACH (OUTPATIENT)
Dept: ADMINISTRATIVE | Facility: CLINIC | Age: 70
End: 2025-04-25
Payer: COMMERCIAL

## 2025-04-25 NOTE — PROGRESS NOTES
C3 nurse attempted to contact Juan Carlos Yoo Sr.  for a TCC post hospital discharge follow up call. No answer.     The patient does not have a scheduled HOSFU appointment. Message sent to PCP's staff to assist with HOSFU appointment scheduling.

## 2025-04-30 ENCOUNTER — LAB REQUISITION (OUTPATIENT)
Dept: LAB | Facility: HOSPITAL | Age: 70
End: 2025-04-30
Payer: COMMERCIAL

## 2025-04-30 DIAGNOSIS — I12.0 HYPERTENSIVE CHRONIC KIDNEY DISEASE WITH STAGE 5 CHRONIC KIDNEY DISEASE OR END STAGE RENAL DISEASE: ICD-10-CM

## 2025-04-30 DIAGNOSIS — I48.11 LONGSTANDING PERSISTENT ATRIAL FIBRILLATION: ICD-10-CM

## 2025-04-30 DIAGNOSIS — N18.6 END STAGE RENAL DISEASE: ICD-10-CM

## 2025-04-30 DIAGNOSIS — K70.30 ALCOHOLIC CIRRHOSIS OF LIVER WITHOUT ASCITES: ICD-10-CM

## 2025-04-30 LAB
ABSOLUTE EOSINOPHIL (OHS): 0.22 K/UL
ABSOLUTE MONOCYTE (OHS): 0.82 K/UL (ref 0.3–1)
ABSOLUTE NEUTROPHIL COUNT (OHS): 2.71 K/UL (ref 1.8–7.7)
ALBUMIN SERPL BCP-MCNC: 2.3 G/DL (ref 3.5–5.2)
ALP SERPL-CCNC: 90 UNIT/L (ref 40–150)
ALT SERPL W/O P-5'-P-CCNC: 10 UNIT/L (ref 10–44)
ANION GAP (OHS): 12 MMOL/L (ref 8–16)
AST SERPL-CCNC: 31 UNIT/L (ref 11–45)
BASOPHILS # BLD AUTO: 0.08 K/UL
BASOPHILS NFR BLD AUTO: 1.6 %
BILIRUB SERPL-MCNC: 4.7 MG/DL (ref 0.1–1)
BUN SERPL-MCNC: 38 MG/DL (ref 8–23)
CALCIUM SERPL-MCNC: 8.9 MG/DL (ref 8.7–10.5)
CHLORIDE SERPL-SCNC: 91 MMOL/L (ref 95–110)
CO2 SERPL-SCNC: 31 MMOL/L (ref 23–29)
CREAT SERPL-MCNC: 2.3 MG/DL (ref 0.5–1.4)
ERYTHROCYTE [DISTWIDTH] IN BLOOD BY AUTOMATED COUNT: 15.5 % (ref 11.5–14.5)
GFR SERPLBLD CREATININE-BSD FMLA CKD-EPI: 30 ML/MIN/1.73/M2
GLUCOSE SERPL-MCNC: 92 MG/DL (ref 70–110)
HCT VFR BLD AUTO: 32.6 % (ref 40–54)
HGB BLD-MCNC: 10.9 GM/DL (ref 14–18)
IMM GRANULOCYTES # BLD AUTO: 0.03 K/UL (ref 0–0.04)
IMM GRANULOCYTES NFR BLD AUTO: 0.6 % (ref 0–0.5)
LYMPHOCYTES # BLD AUTO: 1.06 K/UL (ref 1–4.8)
MCH RBC QN AUTO: 31.3 PG (ref 27–31)
MCHC RBC AUTO-ENTMCNC: 33.4 G/DL (ref 32–36)
MCV RBC AUTO: 94 FL (ref 82–98)
NUCLEATED RBC (/100WBC) (OHS): 0 /100 WBC
PLATELET # BLD AUTO: 92 K/UL (ref 150–450)
PMV BLD AUTO: 11.8 FL (ref 9.2–12.9)
POTASSIUM SERPL-SCNC: 3.3 MMOL/L (ref 3.5–5.1)
PROT SERPL-MCNC: 7.4 GM/DL (ref 6–8.4)
RBC # BLD AUTO: 3.48 M/UL (ref 4.6–6.2)
RELATIVE EOSINOPHIL (OHS): 4.5 %
RELATIVE LYMPHOCYTE (OHS): 21.5 % (ref 18–48)
RELATIVE MONOCYTE (OHS): 16.7 % (ref 4–15)
RELATIVE NEUTROPHIL (OHS): 55.1 % (ref 38–73)
SODIUM SERPL-SCNC: 134 MMOL/L (ref 136–145)
WBC # BLD AUTO: 4.92 K/UL (ref 3.9–12.7)

## 2025-04-30 PROCEDURE — 85025 COMPLETE CBC W/AUTO DIFF WBC: CPT

## 2025-04-30 PROCEDURE — 80053 COMPREHEN METABOLIC PANEL: CPT

## 2025-05-01 PROBLEM — Z09 HOSPITAL DISCHARGE FOLLOW-UP: Status: ACTIVE | Noted: 2025-05-01

## 2025-05-02 NOTE — ASSESSMENT & PLAN NOTE
2/23 FM:  Starting abx, get US.    2/24:  Increased induration and erythema noted.  Ultrasound pending.  Patient afebrile without leukocytosis.  Continue IV antibiotics.      2/25:  Ultrasound obtained yesterday reveals 12.6 x 6.1 x 3.0 cm complex fluid collection to patient's medial right thigh.  General surgery consulted.  Appreciate recs.  Patient afebrile without leukocytosis.  Continue current IV antibiotic treatment.     no

## 2025-05-04 ENCOUNTER — HOSPITAL ENCOUNTER (INPATIENT)
Facility: HOSPITAL | Age: 70
LOS: 2 days | Discharge: HOME HEALTH | DRG: 641 | End: 2025-05-07
Attending: STUDENT IN AN ORGANIZED HEALTH CARE EDUCATION/TRAINING PROGRAM | Admitting: INTERNAL MEDICINE
Payer: COMMERCIAL

## 2025-05-04 DIAGNOSIS — R63.5 WEIGHT GAIN WITH EDEMA: ICD-10-CM

## 2025-05-04 DIAGNOSIS — K76.82 HEPATIC ENCEPHALOPATHY: Primary | ICD-10-CM

## 2025-05-04 DIAGNOSIS — N18.4 CKD (CHRONIC KIDNEY DISEASE) STAGE 4, GFR 15-29 ML/MIN: ICD-10-CM

## 2025-05-04 DIAGNOSIS — R53.1 WEAKNESS: ICD-10-CM

## 2025-05-04 DIAGNOSIS — R60.9 WEIGHT GAIN WITH EDEMA: ICD-10-CM

## 2025-05-04 LAB
ABSOLUTE EOSINOPHIL (OHS): 0.2 K/UL
ABSOLUTE MONOCYTE (OHS): 1.25 K/UL (ref 0.3–1)
ABSOLUTE NEUTROPHIL COUNT (OHS): 4.75 K/UL (ref 1.8–7.7)
ALBUMIN SERPL BCP-MCNC: 2.2 G/DL (ref 3.5–5.2)
ALP SERPL-CCNC: 94 UNIT/L (ref 40–150)
ALT SERPL W/O P-5'-P-CCNC: 10 UNIT/L (ref 10–44)
AMMONIA PLAS-SCNC: 117 UMOL/L (ref 10–50)
ANION GAP (OHS): 9 MMOL/L (ref 8–16)
AST SERPL-CCNC: 28 UNIT/L (ref 11–45)
BACTERIA #/AREA URNS AUTO: NORMAL /HPF
BASOPHILS # BLD AUTO: 0.06 K/UL
BASOPHILS NFR BLD AUTO: 0.8 %
BILIRUB SERPL-MCNC: 4.2 MG/DL (ref 0.1–1)
BILIRUB UR QL STRIP.AUTO: NEGATIVE
BUN SERPL-MCNC: 36 MG/DL (ref 8–23)
CALCIUM SERPL-MCNC: 8.4 MG/DL (ref 8.7–10.5)
CHLORIDE SERPL-SCNC: 90 MMOL/L (ref 95–110)
CLARITY UR: CLEAR
CO2 SERPL-SCNC: 31 MMOL/L (ref 23–29)
COLOR UR AUTO: YELLOW
CREAT SERPL-MCNC: 2.4 MG/DL (ref 0.5–1.4)
ERYTHROCYTE [DISTWIDTH] IN BLOOD BY AUTOMATED COUNT: 15.2 % (ref 11.5–14.5)
GFR SERPLBLD CREATININE-BSD FMLA CKD-EPI: 28 ML/MIN/1.73/M2
GLUCOSE SERPL-MCNC: 123 MG/DL (ref 70–110)
GLUCOSE UR QL STRIP: NEGATIVE
HCT VFR BLD AUTO: 31.7 % (ref 40–54)
HGB BLD-MCNC: 10.8 GM/DL (ref 14–18)
HGB UR QL STRIP: NEGATIVE
HOLD SPECIMEN: NORMAL
HYALINE CASTS UR QL AUTO: 1 /LPF (ref 0–1)
IMM GRANULOCYTES # BLD AUTO: 0.04 K/UL (ref 0–0.04)
IMM GRANULOCYTES NFR BLD AUTO: 0.5 % (ref 0–0.5)
KETONES UR QL STRIP: NEGATIVE
LEUKOCYTE ESTERASE UR QL STRIP: ABNORMAL
LIPASE SERPL-CCNC: 27 U/L (ref 4–60)
LYMPHOCYTES # BLD AUTO: 1.19 K/UL (ref 1–4.8)
MCH RBC QN AUTO: 31.9 PG (ref 27–31)
MCHC RBC AUTO-ENTMCNC: 34.1 G/DL (ref 32–36)
MCV RBC AUTO: 94 FL (ref 82–98)
MICROSCOPIC COMMENT: NORMAL
NITRITE UR QL STRIP: NEGATIVE
NT-PROBNP SERPL-MCNC: 1414 PG/ML
NUCLEATED RBC (/100WBC) (OHS): 0 /100 WBC
PH UR STRIP: 8 [PH]
PLATELET # BLD AUTO: 95 K/UL (ref 150–450)
PMV BLD AUTO: 11.9 FL (ref 9.2–12.9)
POTASSIUM SERPL-SCNC: 3.5 MMOL/L (ref 3.5–5.1)
PROT SERPL-MCNC: 6.9 GM/DL (ref 6–8.4)
PROT UR QL STRIP: NEGATIVE
RBC # BLD AUTO: 3.39 M/UL (ref 4.6–6.2)
RBC #/AREA URNS AUTO: 0 /HPF (ref 0–4)
RELATIVE EOSINOPHIL (OHS): 2.7 %
RELATIVE LYMPHOCYTE (OHS): 15.9 % (ref 18–48)
RELATIVE MONOCYTE (OHS): 16.7 % (ref 4–15)
RELATIVE NEUTROPHIL (OHS): 63.4 % (ref 38–73)
SODIUM SERPL-SCNC: 130 MMOL/L (ref 136–145)
SP GR UR STRIP: 1.01
SQUAMOUS #/AREA URNS AUTO: 0 /HPF
TROPONIN I SERPL HS-MCNC: 17 NG/L
UROBILINOGEN UR STRIP-ACNC: 1 EU/DL
WBC # BLD AUTO: 7.49 K/UL (ref 3.9–12.7)
WBC #/AREA URNS AUTO: 5 /HPF (ref 0–5)

## 2025-05-04 PROCEDURE — 84484 ASSAY OF TROPONIN QUANT: CPT | Performed by: STUDENT IN AN ORGANIZED HEALTH CARE EDUCATION/TRAINING PROGRAM

## 2025-05-04 PROCEDURE — 99285 EMERGENCY DEPT VISIT HI MDM: CPT | Mod: 25

## 2025-05-04 PROCEDURE — 83690 ASSAY OF LIPASE: CPT | Performed by: STUDENT IN AN ORGANIZED HEALTH CARE EDUCATION/TRAINING PROGRAM

## 2025-05-04 PROCEDURE — 85025 COMPLETE CBC W/AUTO DIFF WBC: CPT | Performed by: STUDENT IN AN ORGANIZED HEALTH CARE EDUCATION/TRAINING PROGRAM

## 2025-05-04 PROCEDURE — 36415 COLL VENOUS BLD VENIPUNCTURE: CPT | Performed by: STUDENT IN AN ORGANIZED HEALTH CARE EDUCATION/TRAINING PROGRAM

## 2025-05-04 PROCEDURE — 80053 COMPREHEN METABOLIC PANEL: CPT | Performed by: STUDENT IN AN ORGANIZED HEALTH CARE EDUCATION/TRAINING PROGRAM

## 2025-05-04 PROCEDURE — G0378 HOSPITAL OBSERVATION PER HR: HCPCS

## 2025-05-04 PROCEDURE — 83880 ASSAY OF NATRIURETIC PEPTIDE: CPT | Performed by: STUDENT IN AN ORGANIZED HEALTH CARE EDUCATION/TRAINING PROGRAM

## 2025-05-04 PROCEDURE — 82140 ASSAY OF AMMONIA: CPT | Performed by: STUDENT IN AN ORGANIZED HEALTH CARE EDUCATION/TRAINING PROGRAM

## 2025-05-04 PROCEDURE — 25000003 PHARM REV CODE 250: Performed by: STUDENT IN AN ORGANIZED HEALTH CARE EDUCATION/TRAINING PROGRAM

## 2025-05-04 PROCEDURE — 81003 URINALYSIS AUTO W/O SCOPE: CPT | Performed by: STUDENT IN AN ORGANIZED HEALTH CARE EDUCATION/TRAINING PROGRAM

## 2025-05-04 RX ORDER — SODIUM CHLORIDE 0.9 % (FLUSH) 0.9 %
10 SYRINGE (ML) INJECTION
Status: DISCONTINUED | OUTPATIENT
Start: 2025-05-04 | End: 2025-05-07 | Stop reason: HOSPADM

## 2025-05-04 RX ORDER — TALC
6 POWDER (GRAM) TOPICAL NIGHTLY PRN
Status: DISCONTINUED | OUTPATIENT
Start: 2025-05-04 | End: 2025-05-06

## 2025-05-04 RX ORDER — ONDANSETRON HYDROCHLORIDE 2 MG/ML
4 INJECTION, SOLUTION INTRAVENOUS EVERY 8 HOURS PRN
Status: DISCONTINUED | OUTPATIENT
Start: 2025-05-04 | End: 2025-05-07 | Stop reason: HOSPADM

## 2025-05-04 RX ADMIN — Medication 6 MG: at 09:05

## 2025-05-04 RX ADMIN — LACTULOSE 20 G: 20 SOLUTION ORAL at 03:05

## 2025-05-04 NOTE — ED PROVIDER NOTES
"Encounter Date: 5/4/2025       History     Chief Complaint   Patient presents with    Altered Mental Status     Pt and pt's son, Jr Juan Carlos report pt has been very confused, recently in hospital, saw Dr Villegas on this past Wednesday.     69-year-old male with history of atrial fibrillation, liver cirrhosis presents to ED with son for complaints of confusion.  Reports since Wednesday patient has been progressively becoming more confused and had had a cognitive decline.  Reports that he has been displaying bizarre behavior at home and unable to identify  normal objects.  He is also seeing things that are not there.  Reports last time this occurred his ammonia level was elevated.  Reports the patient has not had an alcoholic beverage since December.  Patient denies any headache, chest pain, shortness of breath.  Denies any nausea or vomiting.        Review of patient's allergies indicates:  No Known Allergies  Past Medical History:   Diagnosis Date    Atrial fibrillation     Bulging disc     Cirrhosis     Colon polyp 12/29/2017    Rpt 5 yrs    Encounter for blood transfusion     EV (esophageal varices)     Hypertension 03/30/2023    Renal disorder     Skin cancer 03/2017    'NOSE"    Thrombocytopenia      Past Surgical History:   Procedure Laterality Date    CATHETERIZATION OF BOTH LEFT AND RIGHT HEART N/A 02/08/2023    Procedure: CATHETERIZATION, HEART, BOTH LEFT AND RIGHT;  Surgeon: Flo Malone MD;  Location: [facility] CATH LAB;  Service: Cardiology;  Laterality: N/A;  low bleeding risk 1.0%    CHOLECYSTECTOMY      COLONOSCOPY      COLONOSCOPY N/A 12/29/2017    ta rpt 2022    CORONARY ANGIOGRAPHY N/A 02/08/2023    Procedure: ANGIOGRAM, CORONARY ARTERY;  Surgeon: Flo Malone MD;  Location: [facility] CATH LAB;  Service: Cardiology;  Laterality: N/A;    HERNIA REPAIR      INCISION AND DRAINAGE Right 02/27/2025    Procedure: Incision and Drainage, thigh;  Surgeon: Kayla oFster MD;  Location: [facility] OR;  Service: " General;  Laterality: Right;  Plan to go first if pt has cardiac clearance - SB    SKIN BIOPSY  03/2017    TONSILLECTOMY      UPPER GASTROINTESTINAL ENDOSCOPY  2011    EV    UPPER GASTROINTESTINAL ENDOSCOPY  2016    VASECTOMY       Family History   Problem Relation Name Age of Onset    Alzheimer's disease Mother      No Known Problems Father      Ovarian cancer Sister      Hyperlipidemia Brother      Cancer Maternal Uncle          Leukemia    Heart disease Maternal Uncle      Colon cancer Neg Hx       Social History[1]  Review of Systems   Respiratory:  Negative for chest tightness and shortness of breath.        Physical Exam     Initial Vitals [05/04/25 1243]   BP Pulse Resp Temp SpO2   101/67 77 18 98.6 °F (37 °C) 100 %      MAP       --         Physical Exam    Vitals reviewed.  Constitutional: He appears well-developed and well-nourished.   HENT:   Head: Normocephalic and atraumatic.   Eyes: EOM are normal. Pupils are equal, round, and reactive to light.   Neck:   Normal range of motion.  Cardiovascular:  Normal rate.           Pulmonary/Chest: Breath sounds normal.   Abdominal: Abdomen is soft.   Musculoskeletal:         General: Normal range of motion.      Cervical back: Normal range of motion.     Neurological: He is alert and oriented to person, place, and time. GCS score is 15. GCS eye subscore is 4. GCS verbal subscore is 5. GCS motor subscore is 6.   Skin: Skin is warm. Capillary refill takes less than 2 seconds.         ED Course   Critical Care    Date/Time: 5/4/2025 3:42 PM    Performed by: Jack Zavala MD  Authorized by: Jack Zavala MD  Direct patient critical care time: 10 minutes  Additional history critical care time: 10 minutes  Ordering / reviewing critical care time: 10 minutes  Total critical care time (exclusive of procedural time) : 30 minutes  Critical care was necessary to treat or prevent imminent or life-threatening deterioration of the following conditions: hepatic  failure.  Critical care was time spent personally by me on the following activities: development of treatment plan with patient or surrogate, discussions with primary provider and evaluation of patient's response to treatment.        Labs Reviewed   CBC W/ AUTO DIFFERENTIAL    Narrative:     The following orders were created for panel order CBC auto differential.  Procedure                               Abnormality         Status                     ---------                               -----------         ------                     CBC with Differential[[id number]]                                                        Please view results for these tests on the individual orders.   COMPREHENSIVE METABOLIC PANEL   AMMONIA   LIPASE   URINALYSIS, REFLEX TO URINE CULTURE   CBC WITH DIFFERENTIAL   TROPONIN I HIGH SENSITIVITY   NT-PRO NATRIURETIC PEPTIDE          Imaging Results    None          Medications - No data to display  Medical Decision Making  Patient presents to the emergency department with son for complaints of confusion, weakness.  Hurt and metformin patient is afebrile, nontoxic, lying comfortably in ED bed.  Patient is alert, awake, oriented x3.  No focal deficits appreciated on examination.  Labs and imaging were ordered.  Labs revealed that patient has elevated ammonia.  No leukocytosis noted.  Chemistry panel also shows that patient has CKD with a creatinine 2.4, bilirubin of 4.2.  Elevated BNP at 1414.  Patient was given p.o. lactulose.  Will be admitted to Hospital Medicine for further evaluation and treatment.  Understands and agrees with plan.    Amount and/or Complexity of Data Reviewed  Labs: ordered.  Radiology: ordered.    Risk  Prescription drug management.                                      Clinical Impression:  Final diagnoses:  [R53.1] Weakness                     [1]   Social History  Tobacco Use    Smoking status: Never     Passive exposure: Never    Smokeless tobacco: Never    Substance Use Topics    Alcohol use: Not Currently     Alcohol/week: 0.0 standard drinks of alcohol    Drug use: No        Jack Zavala MD  05/04/25 0073

## 2025-05-04 NOTE — EICU
"Virtual ICU Admission    Admit Date: 2025  LOS: 0  Code Status: Full Code   : 1955  Bed:  A:     Diagnosis: <principal problem not specified>    Patient  has a past medical history of Atrial fibrillation, Bulging disc, Cirrhosis, Colon polyp, Encounter for blood transfusion, EV (esophageal varices), Hypertension, Renal disorder, Skin cancer, and Thrombocytopenia.    Last VS: BP (!) 140/77   Pulse 75   Temp 97.7 °F (36.5 °C) (Oral)   Resp (!) 23   Ht 6' 1" (1.854 m)   Wt 108.1 kg (238 lb 5.1 oz)   SpO2 100%   BMI 31.44 kg/m²       VICU Review                      "

## 2025-05-04 NOTE — ED NOTES
NEUROLOGICAL:   Patient is awake , alert , and oriented x 4 .   Moves all extremities without difficulty.   Patient reports weakness.  GCS 15    CARDIOVASCULAR:   S1 and S2 present, no murmurs, gallops, or rubs, rate regular , and pulses palpable (2+)    On palpation no edema noted , noted to none.   Patient reports no CV complaints.  .     RESPIRATORY:   Airway Clear, Open, and Patent.  Respirations are even and unlabored.   Breath sounds clear  to all lung fields.   Patient reports no respiratory complaints.     GASTROINTESTINAL:   Abdomen is soft  and non-tender x 4 quadrants. Bowel sounds are normoactive to all quadrants .   Patient reports no GI complaints .     SKIN:   Skin appears warm , dry , good turgor, color normal for race, and intact.

## 2025-05-04 NOTE — NURSING
Patient admitted from ER at this time via wheelchair. Patient ambulated from wheelchair to bathroom without issues to urinate. Patient then got into bed then placed on icu monitor, including HR, RR, SpO2, NIBP monitoring. A FIB noted on monitor, rate controlled. Vitals stable. Patient is AAO x4 at this time. No concerns at present.

## 2025-05-04 NOTE — ED NOTES
Patient and patient son states suddenly patient became forgetful 5 days ago, patients son states this has happened before with ammonia level was high due to stage 3 cirrhosis

## 2025-05-05 LAB
ABSOLUTE EOSINOPHIL (OHS): 0.31 K/UL
ABSOLUTE MONOCYTE (OHS): 0.93 K/UL (ref 0.3–1)
ABSOLUTE NEUTROPHIL COUNT (OHS): 3.31 K/UL (ref 1.8–7.7)
ALBUMIN SERPL BCP-MCNC: 1.9 G/DL (ref 3.5–5.2)
ALP SERPL-CCNC: 80 UNIT/L (ref 40–150)
ALT SERPL W/O P-5'-P-CCNC: 9 UNIT/L (ref 10–44)
AMMONIA PLAS-SCNC: 122 UMOL/L (ref 10–50)
ANION GAP (OHS): 8 MMOL/L (ref 8–16)
AST SERPL-CCNC: 28 UNIT/L (ref 11–45)
BASOPHILS # BLD AUTO: 0.07 K/UL
BASOPHILS NFR BLD AUTO: 1.2 %
BILIRUB SERPL-MCNC: 3.8 MG/DL (ref 0.1–1)
BUN SERPL-MCNC: 36 MG/DL (ref 8–23)
CALCIUM SERPL-MCNC: 8 MG/DL (ref 8.7–10.5)
CHLORIDE SERPL-SCNC: 92 MMOL/L (ref 95–110)
CO2 SERPL-SCNC: 30 MMOL/L (ref 23–29)
CREAT SERPL-MCNC: 2.2 MG/DL (ref 0.5–1.4)
ERYTHROCYTE [DISTWIDTH] IN BLOOD BY AUTOMATED COUNT: 14.8 % (ref 11.5–14.5)
GFR SERPLBLD CREATININE-BSD FMLA CKD-EPI: 31 ML/MIN/1.73/M2
GLUCOSE SERPL-MCNC: 85 MG/DL (ref 70–110)
HCT VFR BLD AUTO: 28.9 % (ref 40–54)
HGB BLD-MCNC: 10.1 GM/DL (ref 14–18)
IMM GRANULOCYTES # BLD AUTO: 0.03 K/UL (ref 0–0.04)
IMM GRANULOCYTES NFR BLD AUTO: 0.5 % (ref 0–0.5)
LYMPHOCYTES # BLD AUTO: 1.3 K/UL (ref 1–4.8)
MCH RBC QN AUTO: 31.9 PG (ref 27–31)
MCHC RBC AUTO-ENTMCNC: 34.9 G/DL (ref 32–36)
MCV RBC AUTO: 91 FL (ref 82–98)
NUCLEATED RBC (/100WBC) (OHS): 0 /100 WBC
PLATELET # BLD AUTO: 103 K/UL (ref 150–450)
PMV BLD AUTO: 11.6 FL (ref 9.2–12.9)
POTASSIUM SERPL-SCNC: 3 MMOL/L (ref 3.5–5.1)
PROT SERPL-MCNC: 6.1 GM/DL (ref 6–8.4)
RBC # BLD AUTO: 3.17 M/UL (ref 4.6–6.2)
RELATIVE EOSINOPHIL (OHS): 5.2 %
RELATIVE LYMPHOCYTE (OHS): 21.8 % (ref 18–48)
RELATIVE MONOCYTE (OHS): 15.6 % (ref 4–15)
RELATIVE NEUTROPHIL (OHS): 55.7 % (ref 38–73)
SODIUM SERPL-SCNC: 130 MMOL/L (ref 136–145)
WBC # BLD AUTO: 5.95 K/UL (ref 3.9–12.7)

## 2025-05-05 PROCEDURE — 20000000 HC ICU ROOM

## 2025-05-05 PROCEDURE — 82140 ASSAY OF AMMONIA: CPT | Performed by: INTERNAL MEDICINE

## 2025-05-05 PROCEDURE — 80053 COMPREHEN METABOLIC PANEL: CPT | Performed by: INTERNAL MEDICINE

## 2025-05-05 PROCEDURE — 97161 PT EVAL LOW COMPLEX 20 MIN: CPT

## 2025-05-05 PROCEDURE — 85025 COMPLETE CBC W/AUTO DIFF WBC: CPT | Performed by: INTERNAL MEDICINE

## 2025-05-05 PROCEDURE — 25000003 PHARM REV CODE 250: Performed by: INTERNAL MEDICINE

## 2025-05-05 PROCEDURE — 36415 COLL VENOUS BLD VENIPUNCTURE: CPT | Performed by: INTERNAL MEDICINE

## 2025-05-05 RX ORDER — TAMSULOSIN HYDROCHLORIDE 0.4 MG/1
0.4 CAPSULE ORAL DAILY
Status: DISCONTINUED | OUTPATIENT
Start: 2025-05-05 | End: 2025-05-07 | Stop reason: HOSPADM

## 2025-05-05 RX ORDER — METOPROLOL TARTRATE 25 MG/1
50 TABLET, FILM COATED ORAL 2 TIMES DAILY
Status: DISCONTINUED | OUTPATIENT
Start: 2025-05-05 | End: 2025-05-05

## 2025-05-05 RX ORDER — METOPROLOL TARTRATE 25 MG/1
25 TABLET, FILM COATED ORAL 2 TIMES DAILY
Status: DISCONTINUED | OUTPATIENT
Start: 2025-05-05 | End: 2025-05-05

## 2025-05-05 RX ORDER — FAMOTIDINE 20 MG/1
20 TABLET, FILM COATED ORAL DAILY
Status: DISCONTINUED | OUTPATIENT
Start: 2025-05-05 | End: 2025-05-07 | Stop reason: HOSPADM

## 2025-05-05 RX ORDER — METOPROLOL TARTRATE 25 MG/1
12.5 TABLET ORAL 2 TIMES DAILY
Status: DISCONTINUED | OUTPATIENT
Start: 2025-05-05 | End: 2025-05-07 | Stop reason: HOSPADM

## 2025-05-05 RX ORDER — MAGNESIUM CHLORIDE 64 MG
128 TABLET, DELAYED RELEASE (ENTERIC COATED) ORAL DAILY
Status: DISCONTINUED | OUTPATIENT
Start: 2025-05-05 | End: 2025-05-07 | Stop reason: HOSPADM

## 2025-05-05 RX ORDER — FUROSEMIDE 40 MG/1
80 TABLET ORAL DAILY
Status: DISCONTINUED | OUTPATIENT
Start: 2025-05-05 | End: 2025-05-05

## 2025-05-05 RX ORDER — SPIRONOLACTONE 25 MG/1
50 TABLET ORAL DAILY
Status: DISCONTINUED | OUTPATIENT
Start: 2025-05-05 | End: 2025-05-07 | Stop reason: HOSPADM

## 2025-05-05 RX ADMIN — SPIRONOLACTONE 50 MG: 25 TABLET ORAL at 09:05

## 2025-05-05 RX ADMIN — METOPROLOL TARTRATE 12.5 MG: 25 TABLET, FILM COATED ORAL at 08:05

## 2025-05-05 RX ADMIN — TAMSULOSIN HYDROCHLORIDE 0.4 MG: 0.4 CAPSULE ORAL at 09:05

## 2025-05-05 RX ADMIN — RIFAXIMIN 550 MG: 550 TABLET ORAL at 08:05

## 2025-05-05 RX ADMIN — LACTULOSE 30 G: 20 SOLUTION ORAL at 04:05

## 2025-05-05 RX ADMIN — POTASSIUM BICARBONATE 25 MEQ: 978 TABLET, EFFERVESCENT ORAL at 08:05

## 2025-05-05 RX ADMIN — LACTULOSE 30 G: 20 SOLUTION ORAL at 09:05

## 2025-05-05 RX ADMIN — METOPROLOL TARTRATE 12.5 MG: 25 TABLET, FILM COATED ORAL at 09:05

## 2025-05-05 RX ADMIN — FAMOTIDINE 20 MG: 20 TABLET, FILM COATED ORAL at 09:05

## 2025-05-05 RX ADMIN — LACTULOSE 30 G: 20 SOLUTION ORAL at 08:05

## 2025-05-05 RX ADMIN — POTASSIUM BICARBONATE 25 MEQ: 978 TABLET, EFFERVESCENT ORAL at 09:05

## 2025-05-05 RX ADMIN — RIFAXIMIN 550 MG: 550 TABLET ORAL at 09:05

## 2025-05-05 RX ADMIN — MAGNESIUM 64 MG (MAGNESIUM CHLORIDE) TABLET,DELAYED RELEASE 128 MG: at 09:05

## 2025-05-05 NOTE — ASSESSMENT & PLAN NOTE
Nutrition consulted. Most recent weight and BMI monitored-     Measurements:  Wt Readings from Last 1 Encounters:   05/04/25 108.1 kg (238 lb 5.1 oz)   Body mass index is 31.44 kg/m².    Patient has been screened and assessed by RD.    Malnutrition Type:  Context:    Level:      Malnutrition Characteristic Summary:       Interventions/Recommendations (treatment strategy):

## 2025-05-05 NOTE — EICU
Intervention Initiated From:  COR / JENIU    Rusty intervened regarding:  Rounding (Video assessment)    VICU Night Rounds Checklist  24H Vital Sign Range:  Temp:  [97.7 °F (36.5 °C)-98.6 °F (37 °C)]   Pulse:  [70-83]   Resp:  [5-23]   BP: ()/(51-77)   SpO2:  [76 %-100 %]     Video rounds

## 2025-05-05 NOTE — ASSESSMENT & PLAN NOTE
Patient's most recent potassium results are listed below.   Recent Labs     05/04/25  1316 05/05/25  0419   K 3.5 3.0*     Plan  - Replete potassium per protocol  - Monitor potassium Daily  - Patient's hypokalemia is improving

## 2025-05-05 NOTE — PLAN OF CARE
[place] - Intensive Care  Initial Discharge Assessment       Primary Care Provider: Joao Villegas III, MD    Admission Diagnosis: Hepatic encephalopathy [K76.82]  Weakness [R53.1]    Admission Date: 5/4/2025  Expected Discharge Date:     Transition of Care Barriers: None    Payor: BLUE CROSS BLUE [employer]IELD / Plan: [place]BS [place] PPO / Product Type: PPO /     Extended Emergency Contact Information  Primary Emergency Contact: JACQUIE RODRIGUEZ JR  Mobile Phone: [phone]  Relation: Son  Preferred language: English   needed? No  Secondary Emergency Contact: Chante White  Mobile Phone: [phone]  Relation: Relative    Discharge Plan A: Home with family, Home Health  Discharge Plan B: Home with family, Home Health      [id number] - [place], [place] - [address]  [address]  [facility] [address]  Phone: [phone] Fax: [phone]      Initial Assessment (most recent)       Adult Discharge Assessment - 05/05/25 1453          Discharge Assessment    Assessment Type Discharge Planning Assessment     Confirmed/corrected address, phone number and insurance Yes     Confirmed Demographics Correct on Facesheet     Source of Information patient     When was your last doctors appointment? 05/01/25     Reason For Admission Principal Hyponatremia (Chronic)     People in Home child(whit), adult     Do you expect to return to your current living situation? Yes     Do you have help at home or someone to help you manage your care at home? Yes     Who are your caregiver(s) and their phone number(s)? Jacquie (Son)  [phone] (Mobile)     Prior to hospitilization cognitive status: Alert/Oriented     Current cognitive status: Alert/Oriented     Walking or Climbing Stairs Difficulty no   The patient has a wheelchair and rolling walker, but he does not use them.    Dressing/Bathing Difficulty yes     Dressing/Bathing bathing difficulty, requires equipment     Dressing/Bathing Management shower chair     Do you have any  "problems with: Errands/Grocery;Needs other help     Specify other help The patient has a son and step-daughter who assist him.     Home Layout Able to live on 1st floor     Equipment Currently Used at Home shower chair;blood pressure machine;scale     Readmission within 30 days? Yes     Patient currently being followed by outpatient case management? Unable to determine (comments)     Do you currently have service(s) that help you manage your care at home? Yes     Name and Contact number of agency [facility]- [phone]     Is the pt/caregiver preference to resume services with current agency Yes     Do you take prescription medications? Yes     Do you have prescription coverage? Yes     Coverage BLUE [employer]OSS [employer] - BCBS OF [facility]     Do you have any problems affording any of your prescribed medications? TBD     Is the patient taking medications as prescribed? yes     Who is going to help you get home at discharge? family     How do you get to doctors appointments? family or friend will provide;car, drives self     Are you on dialysis? No     Do you take coumadin? No     Discharge Plan A Home with family;Home Health     Discharge Plan B Home with family;Home Health     Discharge Plan discussed with: Patient;Adult children     Transition of Care Barriers None                     Readmission Assessment (most recent)       Readmission Assessment - 05/05/25 5550          Readmission    Was this a planned readmission? No     Why were you hospitalized in the last 30 days? Lower leg edema     Why were you readmitted? Alarmed about signs/symptoms     When you left the hospital how did you feel? "I thought I was good, but my family said I was confused."     When you left the hospital where did you go? Home with Family     When did you start not feeling well? They patient said his family stated that he was confused.     Did you call anyone? Yes     Who did you call? Other (comments)     Did you try to see or did see " a doctor or nurse before you came? No     Why? The patient came to the hospital.     Did you have  a follow-up appointment on discharge? Yes     Did you go? Yes                 Discharge assessment completed with the patient and his step-daughter, Sharmila. The patient is from home. He lives with his son, but the son is not in the home all-day. Sharmila expressed to me that they are working to get the patient back into his home full-time. They have hired a sitter who will be assisting with housekeeping. She stated that the patient was ambulating on his own prior to being admitted, but he does have equipment if needed. The patient also has home health services with [facility], and he plans to resume services with this agency. At this time, the patient and family do not want long-term nursing home placement.       Discharge planning checklist given to the patient/family with instructions to contact  for any needs.  SW will follow as needed.

## 2025-05-05 NOTE — H&P
[facility] Medicine  History & Physical    Patient Name: Juan Carlos Yoo Sr.  MRN: [id number]  Patient Class: OP- Observation  Admission Date: 5/4/2025  Attending Physician: Joao Villegas III, MD  Primary Care Provider: Joao Villegas III, MD         Patient information was obtained from patient, caregiver / friend, past medical records, and ER records.     Subjective:     Principal Problem:Hyponatremia    Chief Complaint:   Chief Complaint   Patient presents with    Altered Mental Status     Pt and pt's son, Jr Juan Carlos report pt has been very confused, recently in hospital, saw Dr Villegas on this past Wednesday.        HPI: ED HPI:  69-year-old male with history of atrial fibrillation, liver cirrhosis presents to ED with son for complaints of confusion. Reports since Wednesday patient has been progressively becoming more confused and had had a cognitive decline. Reports that he has been displaying bizarre behavior at home and unable to identify normal objects. He is also seeing things that are not there. Reports last time this occurred his ammonia level was elevated. Reports the patient has not had an alcoholic beverage since December. Patient denies any headache, chest pain, shortness of breath. Denies any nausea or vomiting.    IM HPI:  Patient is well known to me and was recently admitted to our acute care facility with significant volume overload and anasarca.  We are able to diurese the patient and with metolazone had an excellent response and the patient was down from a wet weight of 290 lb to now 235 lb.  Patient's BUN and creatinine is about the same he has CKD and his bilirubin is in the 3-4 is which seems to be his baseline.  Patient states he has avoided alcohol he has been compliant with his lactulose he is having formed stools patient was unable to fill the Zaroxolyn.  Patient presented back to the ED with a mild hyponatremia hypotension and elevated ammonia levels.  This morning the  "patient is able to provide some history his altered mental status is improved patient has anasarca and edema is much improved.    Past Medical History:   Diagnosis Date    Atrial fibrillation     Bulging disc     Cirrhosis     Colon polyp 12/29/2017    Rpt 5 yrs    Encounter for blood transfusion     EV (esophageal varices)     Hypertension 03/30/2023    Renal disorder     Skin cancer 03/2017    'NOSE"    Thrombocytopenia        Past Surgical History:   Procedure Laterality Date    CATHETERIZATION OF BOTH LEFT AND RIGHT HEART N/A 02/08/2023    Procedure: CATHETERIZATION, HEART, BOTH LEFT AND RIGHT;  Surgeon: Flo Malone MD;  Location: [facility] CATH LAB;  Service: Cardiology;  Laterality: N/A;  low bleeding risk 1.0%    CHOLECYSTECTOMY      COLONOSCOPY      COLONOSCOPY N/A 12/29/2017    ta rpt 2022    CORONARY ANGIOGRAPHY N/A 02/08/2023    Procedure: ANGIOGRAM, CORONARY ARTERY;  Surgeon: Flo Malone MD;  Location: [facility] CATH LAB;  Service: Cardiology;  Laterality: N/A;    HERNIA REPAIR      INCISION AND DRAINAGE Right 02/27/2025    Procedure: Incision and Drainage, thigh;  Surgeon: Kayla Foster MD;  Location: [facility] OR;  Service: General;  Laterality: Right;  Plan to go first if pt has cardiac clearance - SB    SKIN BIOPSY  03/2017    TONSILLECTOMY      UPPER GASTROINTESTINAL ENDOSCOPY  2011    EV    UPPER GASTROINTESTINAL ENDOSCOPY  2016    VASECTOMY         Review of patient's allergies indicates:  No Known Allergies    No current facility-administered medications on file prior to encounter.     Current Outpatient Medications on File Prior to Encounter   Medication Sig    famotidine (PEPCID) 20 MG tablet Take 1 tablet (20 mg total) by mouth once daily.    furosemide (LASIX) 80 MG tablet Take 1 tablet (80 mg total) by mouth once daily. (Once reach dry weight may need adjustment)    lactulose (CHRONULAC) 20 gram/30 mL Soln Take 30 mLs (20 g total) by mouth 2 (two) times daily.    lactulose (CHRONULAC) 20 " gram/30 mL Soln Take 45 mLs (30 g total) by mouth 2 (two) times daily.    magnesium chloride (MAG 64) 64 mg TbEC Take 2 tablets (128 mg total) by mouth once daily.    metOLazone (ZAROXOLYN) 5 MG tablet Take 1 tablet (5 mg total) by mouth every other day. (May need to reduce once reach dry weight)    metoprolol tartrate (LOPRESSOR) 50 MG tablet Take 1 tablet (50 mg total) by mouth 2 (two) times daily.    potassium bicarbonate (K-LYTE) disintegrating tablet Take 1 tablet (25 mEq total) by mouth once daily.    rifAXIMin (XIFAXAN) 550 mg Tab Take 1 tablet (550 mg total) by mouth 2 (two) times daily.    spironolactone (ALDACTONE) 50 MG tablet Take 1 tablet (50 mg total) by mouth once daily.    tamsulosin (FLOMAX) 0.4 mg Cap Take 1 capsule (0.4 mg total) by mouth once daily.    thiamine 100 MG tablet Take 100 mg by mouth once daily.     Family History       Problem Relation (Age of Onset)    Alzheimer's disease Mother    Cancer Maternal Uncle    Heart disease Maternal Uncle    Hyperlipidemia Brother    No Known Problems Father    Ovarian cancer Sister          Tobacco Use    Smoking status: Never     Passive exposure: Never    Smokeless tobacco: Never   Substance and Sexual Activity    Alcohol use: Not Currently     Alcohol/week: 0.0 standard drinks of alcohol    Drug use: No    Sexual activity: Not Currently     Review of Systems   Constitutional:  Positive for fatigue. Negative for chills and fever.   HENT:  Negative for congestion.    Eyes:  Negative for visual disturbance.   Respiratory:  Positive for shortness of breath. Negative for wheezing.    Cardiovascular:  Negative for chest pain, palpitations and leg swelling.   Gastrointestinal:  Positive for abdominal distention. Negative for abdominal pain, blood in stool, constipation, diarrhea, nausea and vomiting.   Endocrine: Negative for polyuria.   Genitourinary:  Negative for dysuria and scrotal swelling.   Musculoskeletal:  Positive for gait problem. Negative for  back pain.   Skin:  Negative for wound.   Neurological:  Positive for weakness.   Psychiatric/Behavioral:  Positive for confusion and hallucinations. The patient is not nervous/anxious.      Objective:     Vital Signs (Most Recent):  Temp: 97.2 °F (36.2 °C) (05/05/25 0400)  Pulse: 81 (05/05/25 0600)  Resp: 18 (05/05/25 0400)  BP: (!) 92/54 (05/05/25 0600)  SpO2: 96 % (05/05/25 0600) Vital Signs (24h Range):  Temp:  [97 °F (36.1 °C)-98.6 °F (37 °C)] 97.2 °F (36.2 °C)  Pulse:  [] 81  Resp:  [5-28] 18  SpO2:  [76 %-100 %] 96 %  BP: ()/(50-77) 92/54     Weight: 108.1 kg (238 lb 5.1 oz)  Body mass index is 31.44 kg/m².     Physical Exam  Vitals and nursing note reviewed.   Constitutional:       General: He is not in acute distress.     Appearance: He is well-developed. He is obese. He is ill-appearing.   HENT:      Head: Normocephalic and atraumatic.      Nose: No congestion or rhinorrhea.   Eyes:      General: Scleral icterus present.         Right eye: No discharge.         Left eye: No discharge.      Extraocular Movements: Extraocular movements intact.   Neck:      Thyroid: No thyromegaly.      Vascular: No JVD.   Cardiovascular:      Rate and Rhythm: Normal rate. Rhythm irregular.      Heart sounds: Normal heart sounds. No murmur heard.     No friction rub. No gallop.   Pulmonary:      Effort: No respiratory distress.      Breath sounds: No stridor. No wheezing, rhonchi or rales.   Abdominal:      General: There is distension.      Palpations: There is no mass.      Tenderness: There is no abdominal tenderness. There is no guarding or rebound.      Hernia: No hernia is present.   Musculoskeletal:      Cervical back: Neck supple.      Comments: Sumit LE much improved, + hyperpigmentation   Skin:     Capillary Refill: Capillary refill takes less than 2 seconds.   Neurological:      Mental Status: He is alert and oriented to person, place, and time.      Motor: Weakness present.   Psychiatric:         Mood  and Affect: Mood normal. Affect is flat.         Behavior: Behavior is slowed. Behavior is not agitated, aggressive or hyperactive.         Cognition and Memory: Cognition is impaired. Memory is impaired.         Judgment: Judgment is not impulsive or inappropriate.                Significant Labs: All pertinent labs within the past 24 hours have been reviewed.  Recent Lab Results         05/05/25  0419   05/04/25  1502   05/04/25  1316        Albumin 1.9     2.2       ALP 80     94       ALT 9     10       Ammonia 122     117       Anion Gap 8     9       Appearance, UA   Clear         AST 28     28       Bacteria, UA   None         Baso # 0.07     0.06       Basophil % 1.2     0.8       Bilirubin (UA)   Negative         BILIRUBIN TOTAL 3.8  Comment: For infants and newborns, interpretation of results should be based   on gestational age, weight and in agreement with clinical   observations.    Premature Infant recommended reference ranges:   0-24 hours:  <8.0 mg/dL   24-48 hours: <12.0 mg/dL   3-5 days:    <15.0 mg/dL   6-29 days:   <15.0 mg/dL     4.2  Comment: For infants and newborns, interpretation of results should be based   on gestational age, weight and in agreement with clinical   observations.    Premature Infant recommended reference ranges:   0-24 hours:  <8.0 mg/dL   24-48 hours: <12.0 mg/dL   3-5 days:    <15.0 mg/dL   6-29 days:   <15.0 mg/dL       BUN 36     36       Calcium 8.0     8.4       Chloride 92     90       CO2 30     31       Color, UA   Yellow         Creatinine 2.2     2.4       eGFR 31  Comment: Estimated GFR calculated using the CKD-EPI creatinine (2021) equation.     28  Comment: Estimated GFR calculated using the CKD-EPI creatinine (2021) equation.       Eos # 0.31     0.20       Eos % 5.2     2.7       Glucose 85     123       Glucose, UA   Negative         Gran # (ANC) 3.31     4.75       Hematocrit 28.9     31.7       Hemoglobin 10.1     10.8       Extra Tube   Hold for  add-ons.  Comment: Auto resulted.            Hyaline Casts, UA   1         Immature Grans (Abs) 0.03  Comment: Mild elevation in immature granulocytes is non specific and can be seen in a variety of conditions including stress response, acute inflammation, trauma and pregnancy. Correlation with other laboratory and clinical findings is essential.     0.04  Comment: Mild elevation in immature granulocytes is non specific and can be seen in a variety of conditions including stress response, acute inflammation, trauma and pregnancy. Correlation with other laboratory and clinical findings is essential.       Immature Granulocytes 0.5     0.5       Ketones, UA   Negative         Leukocyte Esterase, UA   Trace         Lipase     27       Lymph # 1.30     1.19       Lymph % 21.8     15.9       MCH 31.9     31.9       MCHC 34.9     34.1       MCV 91     94       Microscopic Comment     Comment: Other formed elements not mentioned in the report are not present in the microscopic examination.         Mono # 0.93     1.25       Mono % 15.6     16.7       MPV 11.6     11.9       Neut % 55.7     63.4       NITRITE UA   Negative         nRBC 0     0       NT-proBNP     1,414       Blood, UA   Negative         pH, UA   8.0         Platelet Count 103     95       Potassium 3.0     3.5       PROTEIN TOTAL 6.1     6.9       Protein, UA   Negative  Comment: Recommend a 24 hour urine protein or a urine protein/creatinine ratio if globulin induced proteinuria is clinically suspected.         RBC 3.17     3.39       RBC, UA   0         RDW 14.8     15.2       Sodium 130     130       Spec Grav UA   1.010         Squam Epithel, UA   0         Troponin I High Sensitivity     17       Urobilinogen, UA   1.0         WBC, UA   5         WBC 5.95     7.49               Significant Imaging: I have reviewed all pertinent imaging results/findings within the past 24 hours.  Assessment/Plan:     Assessment & Plan  Hyponatremia  Hyponatremia is  likely due to Dehydration/hypovolemia. The patient's most recent sodium results are listed below.  Recent Labs     05/04/25  1316 05/05/25  0419   * 130*     Plan  - Correct the sodium by 4-6mEq in 24 hours.   - Obtain the following studies: None.  - Will treat the hyponatremia with Removal of offending medications  - Monitor sodium Daily.   - Patient hyponatremia is improving  Cirrhosis    MELD-Na score calculated; MELD 3.0: 27 at 4/15/2025  5:36 AM  MELD-Na: 26 at 4/15/2025  5:36 AM  Calculated from:  Serum Creatinine: 2.2 mg/dL at 4/15/2025  5:36 AM  Serum Sodium: 135 mmol/L at 4/15/2025  5:36 AM  Total Bilirubin: 3.5 mg/dL at 4/15/2025  5:36 AM  Serum Albumin: 2 g/dL at 4/15/2025  5:36 AM  INR(ratio): 1.7 at 4/13/2025 12:42 PM  Age at listing (hypothetical): 69 years  Sex: Male at 4/15/2025  5:36 AM      Continue chronic meds. Etiology likely ETOH. Will avoid any hepatotoxic meds, and monitor CBC/CMP/INR for synthetic function.   Longstanding persistent atrial fibrillation  Patient has long standing persistent (>12 months) atrial fibrillation. Patient is currently in atrial fibrillation. [id number] Score: 1. The patients heart rate in the last 24 hours is as follows:  Pulse  Min: 70  Max: 100     Antiarrhythmics  metoprolol tartrate (LOPRESSOR) split tablet 12.5 mg, 2 times daily, Oral    Anticoagulants       Plan  Auto-anticoagulated.      CKD (chronic kidney disease) stage 4, GFR 15-29 ml/min  Creatine stable for now. BMP reviewed- noted Estimated Creatinine Clearance: 39.7 mL/min (A) (based on SCr of 2.2 mg/dL (H)). according to latest data. Based on current GFR, CKD stage is stage 4 - GFR 15-29.  Monitor UOP and serial BMP and adjust therapy as needed. Renally dose meds. Avoid nephrotoxic medications and procedures.  Hepatic encephalopathy  Was not able to fill xifaxin, lactulose alone seems to not be enough?  Will titrate dose as he was having formed stools.    Hypotension  Chronic.    Moderate  protein-calorie malnutrition  Nutrition consulted. Most recent weight and BMI monitored-     Measurements:  Wt Readings from Last 1 Encounters:   05/04/25 108.1 kg (238 lb 5.1 oz)   Body mass index is 31.44 kg/m².    Patient has been screened and assessed by RD.    Malnutrition Type:  Context:    Level:      Malnutrition Characteristic Summary:       Interventions/Recommendations (treatment strategy):       Morbid obesity  Body mass index is 31.44 kg/m². Morbid obesity complicates all aspects of disease management from diagnostic modalities to treatment. Weight loss encouraged and health benefits explained to patient.       Hypokalemia  Patient's most recent potassium results are listed below.   Recent Labs     05/04/25  1316 05/05/25  0419   K 3.5 3.0*     Plan  - Replete potassium per protocol  - Monitor potassium Daily  - Patient's hypokalemia is improving  VTE Risk Mitigation (From admission, onward)           Ordered     IP VTE HIGH RISK PATIENT  Once         05/04/25 1645     Place sequential compression device  Until discontinued         05/04/25 1645                       On 05/05/2025, patient should be placed in hospital observation services under my care.             Joao Villegas III, MD  Department of Hospital Medicine  [place] - Intensive Care

## 2025-05-05 NOTE — EICU
VICU Night Rounds Checklist  24H Vital Sign Range:  Temp:  [97 °F (36.1 °C)-98.6 °F (37 °C)]   Pulse:  []   Resp:  [5-28]   BP: ()/(50-77)   SpO2:  [76 %-100 %]             Nursing orders placed : IP THANG Peripheral IV Access

## 2025-05-05 NOTE — ASSESSMENT & PLAN NOTE
Was not able to fill xifaxin, lactulose alone seems to not be enough?  Will titrate dose as he was having formed stools.

## 2025-05-05 NOTE — PLAN OF CARE
No issues overnight. Patient AAOX4, slight confusion noted at times. Calm and cooperative. VSS, afebrile. Patient ambulated to bathroom to void X3 without difficulty and standby assistance only. No BM since arrival to unit. Patient without c/o. Repositions self in bed frequently.

## 2025-05-05 NOTE — PT/OT/SLP EVAL
"Physical Therapy Evaluation and Discharge Note    Patient Name:  Juan Carlos Yoo Sr.   MRN:  [id number]    Recommendations:     Discharge Recommendations: No Therapy Indicated  Discharge Equipment Recommendations: none   Barriers to discharge: None    Assessment:     Juan Carlos Yoo Sr. is a 69 y.o. male admitted with a medical diagnosis of Hyponatremia. .  At this time, patient is functioning at their prior level of function and does not require further acute PT services. If there is a decrease to pt's current functional status, PT is to be re-consulted.    Recent Surgery: * No surgery found *      Plan:     During this hospitalization, patient does not require further acute PT services.  Please re-consult if situation changes.      Subjective     Chief Complaint: "I've been getting up to the restroom"  Patient/Family Comments/goals: none stated  Pain/Comfort:  Pain Rating 1: 0/10    Patients cultural, spiritual, Faith conflicts given the current situation:      Living Environment:  Pt lives with his son in a mobile home with a ramp to enter  Prior to admission, patients level of function was independent.  Equipment used at home: none.  DME owned (not currently used): none.  Upon discharge, patient will have assistance from his son.    Objective:     Communicated with pt prior to session.  Patient found supine with telemetry, pulse ox (continuous), blood pressure cuff, peripheral IV upon PT entry to room.    General Precautions: Standard, fall    Orthopedic Precautions:N/A   Braces: N/A  Respiratory Status: Room air    Exams:  Cognitive Exam:  Patient is oriented to Person, Place, and Time  Fine Motor Coordination:    -       Intact  Gross Motor Coordination:  WFL  Postural Exam:  Patient presented with the following abnormalities:    -       Rounded shoulders  -       Forward head  Sensation:    -       Intact  Skin Integrity/Edema:      -       Skin integrity: Visible skin intact  -       Edema: Mild BLEs  RLE ROM: " "WFL  RLE Strength: WFL  LLE ROM: WFL  LLE Strength: WFL    Functional Mobility:  Bed Mobility:     Rolling Left:  modified independence  Rolling Right: modified independence  Scooting: modified independence  Supine to Sit: modified independence  Sit to Supine: modified independence  Transfers:     Sit to Stand:  independence with no AD  Bed to Chair: independence with  no AD  using  Step Transfer  Gait: Supervision 120' w/ no AD    AM-PAC 6 CLICK MOBILITY  Total Score:24     AM-PAC 6 CLICK MOBILITY  Total Score:24     Patient left supine with all lines intact and call button in reach.    GOALS:   Multidisciplinary Problems       Physical Therapy Goals       Not on file                    History:     Past Medical History:   Diagnosis Date    Atrial fibrillation     Bulging disc     Cirrhosis     Colon polyp 12/29/2017    Rpt 5 yrs    Encounter for blood transfusion     EV (esophageal varices)     Hypertension 03/30/2023    Renal disorder     Skin cancer 03/2017    'NOSE"    Thrombocytopenia        Past Surgical History:   Procedure Laterality Date    CATHETERIZATION OF BOTH LEFT AND RIGHT HEART N/A 02/08/2023    Procedure: CATHETERIZATION, HEART, BOTH LEFT AND RIGHT;  Surgeon: Flo Malone MD;  Location: [facility] CATH LAB;  Service: Cardiology;  Laterality: N/A;  low bleeding risk 1.0%    CHOLECYSTECTOMY      COLONOSCOPY      COLONOSCOPY N/A 12/29/2017    ta rpt 2022    CORONARY ANGIOGRAPHY N/A 02/08/2023    Procedure: ANGIOGRAM, CORONARY ARTERY;  Surgeon: Flo Malone MD;  Location: [facility] CATH LAB;  Service: Cardiology;  Laterality: N/A;    HERNIA REPAIR      INCISION AND DRAINAGE Right 02/27/2025    Procedure: Incision and Drainage, thigh;  Surgeon: Kayla Foster MD;  Location: [facility];  Service: General;  Laterality: Right;  Plan to go first if pt has cardiac clearance - SB    SKIN BIOPSY  03/2017    TONSILLECTOMY      UPPER GASTROINTESTINAL ENDOSCOPY  2011    EV    UPPER GASTROINTESTINAL ENDOSCOPY  " 2016    VASECTOMY         Time Tracking:     PT Received On: 05/05/25  PT Start Time: 1130     PT Stop Time: 1140  PT Total Time (min): 10 min     Billable Minutes: Evaluation 10      05/05/2025

## 2025-05-05 NOTE — ASSESSMENT & PLAN NOTE
Creatine stable for now. BMP reviewed- noted Estimated Creatinine Clearance: 39.7 mL/min (A) (based on SCr of 2.2 mg/dL (H)). according to latest data. Based on current GFR, CKD stage is stage 4 - GFR 15-29.  Monitor UOP and serial BMP and adjust therapy as needed. Renally dose meds. Avoid nephrotoxic medications and procedures.

## 2025-05-05 NOTE — ASSESSMENT & PLAN NOTE
MELD-Na score calculated; MELD 3.0: 27 at 4/15/2025  5:36 AM  MELD-Na: 26 at 4/15/2025  5:36 AM  Calculated from:  Serum Creatinine: 2.2 mg/dL at 4/15/2025  5:36 AM  Serum Sodium: 135 mmol/L at 4/15/2025  5:36 AM  Total Bilirubin: 3.5 mg/dL at 4/15/2025  5:36 AM  Serum Albumin: 2 g/dL at 4/15/2025  5:36 AM  INR(ratio): 1.7 at 4/13/2025 12:42 PM  Age at listing (hypothetical): 69 years  Sex: Male at 4/15/2025  5:36 AM      Continue chronic meds. Etiology likely ETOH. Will avoid any hepatotoxic meds, and monitor CBC/CMP/INR for synthetic function.

## 2025-05-05 NOTE — SUBJECTIVE & OBJECTIVE
"Past Medical History:   Diagnosis Date    Atrial fibrillation     Bulging disc     Cirrhosis     Colon polyp 12/29/2017    Rpt 5 yrs    Encounter for blood transfusion     EV (esophageal varices)     Hypertension 03/30/2023    Renal disorder     Skin cancer 03/2017    'NOSE"    Thrombocytopenia        Past Surgical History:   Procedure Laterality Date    CATHETERIZATION OF BOTH LEFT AND RIGHT HEART N/A 02/08/2023    Procedure: CATHETERIZATION, HEART, BOTH LEFT AND RIGHT;  Surgeon: Flo Malone MD;  Location: [facility] CATH LAB;  Service: Cardiology;  Laterality: N/A;  low bleeding risk 1.0%    CHOLECYSTECTOMY      COLONOSCOPY      COLONOSCOPY N/A 12/29/2017    ta rpt 2022    CORONARY ANGIOGRAPHY N/A 02/08/2023    Procedure: ANGIOGRAM, CORONARY ARTERY;  Surgeon: Flo Malone MD;  Location: [facility] CATH LAB;  Service: Cardiology;  Laterality: N/A;    HERNIA REPAIR      INCISION AND DRAINAGE Right 02/27/2025    Procedure: Incision and Drainage, thigh;  Surgeon: Kayla Foster MD;  Location: [facility];  Service: General;  Laterality: Right;  Plan to go first if pt has cardiac clearance - SB    SKIN BIOPSY  03/2017    TONSILLECTOMY      UPPER GASTROINTESTINAL ENDOSCOPY  2011    EV    UPPER GASTROINTESTINAL ENDOSCOPY  2016    VASECTOMY         Review of patient's allergies indicates:  No Known Allergies    No current facility-administered medications on file prior to encounter.     Current Outpatient Medications on File Prior to Encounter   Medication Sig    famotidine (PEPCID) 20 MG tablet Take 1 tablet (20 mg total) by mouth once daily.    furosemide (LASIX) 80 MG tablet Take 1 tablet (80 mg total) by mouth once daily. (Once reach dry weight may need adjustment)    lactulose (CHRONULAC) 20 gram/30 mL Soln Take 30 mLs (20 g total) by mouth 2 (two) times daily.    lactulose (CHRONULAC) 20 gram/30 mL Soln Take 45 mLs (30 g total) by mouth 2 (two) times daily.    magnesium chloride (MAG 64) 64 mg TbEC Take 2 tablets " (128 mg total) by mouth once daily.    metOLazone (ZAROXOLYN) 5 MG tablet Take 1 tablet (5 mg total) by mouth every other day. (May need to reduce once reach dry weight)    metoprolol tartrate (LOPRESSOR) 50 MG tablet Take 1 tablet (50 mg total) by mouth 2 (two) times daily.    potassium bicarbonate (K-LYTE) disintegrating tablet Take 1 tablet (25 mEq total) by mouth once daily.    rifAXIMin (XIFAXAN) 550 mg Tab Take 1 tablet (550 mg total) by mouth 2 (two) times daily.    spironolactone (ALDACTONE) 50 MG tablet Take 1 tablet (50 mg total) by mouth once daily.    tamsulosin (FLOMAX) 0.4 mg Cap Take 1 capsule (0.4 mg total) by mouth once daily.    thiamine 100 MG tablet Take 100 mg by mouth once daily.     Family History       Problem Relation (Age of Onset)    Alzheimer's disease Mother    Cancer Maternal Uncle    Heart disease Maternal Uncle    Hyperlipidemia Brother    No Known Problems Father    Ovarian cancer Sister          Tobacco Use    Smoking status: Never     Passive exposure: Never    Smokeless tobacco: Never   Substance and Sexual Activity    Alcohol use: Not Currently     Alcohol/week: 0.0 standard drinks of alcohol    Drug use: No    Sexual activity: Not Currently     Review of Systems   Constitutional:  Positive for fatigue. Negative for chills and fever.   HENT:  Negative for congestion.    Eyes:  Negative for visual disturbance.   Respiratory:  Positive for shortness of breath. Negative for wheezing.    Cardiovascular:  Negative for chest pain, palpitations and leg swelling.   Gastrointestinal:  Positive for abdominal distention. Negative for abdominal pain, blood in stool, constipation, diarrhea, nausea and vomiting.   Endocrine: Negative for polyuria.   Genitourinary:  Negative for dysuria and scrotal swelling.   Musculoskeletal:  Positive for gait problem. Negative for back pain.   Skin:  Negative for wound.   Neurological:  Positive for weakness.   Psychiatric/Behavioral:  Positive for confusion  and hallucinations. The patient is not nervous/anxious.      Objective:     Vital Signs (Most Recent):  Temp: 97.2 °F (36.2 °C) (05/05/25 0400)  Pulse: 81 (05/05/25 0600)  Resp: 18 (05/05/25 0400)  BP: (!) 92/54 (05/05/25 0600)  SpO2: 96 % (05/05/25 0600) Vital Signs (24h Range):  Temp:  [97 °F (36.1 °C)-98.6 °F (37 °C)] 97.2 °F (36.2 °C)  Pulse:  [] 81  Resp:  [5-28] 18  SpO2:  [76 %-100 %] 96 %  BP: ()/(50-77) 92/54     Weight: 108.1 kg (238 lb 5.1 oz)  Body mass index is 31.44 kg/m².     Physical Exam  Vitals and nursing note reviewed.   Constitutional:       General: He is not in acute distress.     Appearance: He is well-developed. He is obese. He is ill-appearing.   HENT:      Head: Normocephalic and atraumatic.      Nose: No congestion or rhinorrhea.   Eyes:      General: Scleral icterus present.         Right eye: No discharge.         Left eye: No discharge.      Extraocular Movements: Extraocular movements intact.   Neck:      Thyroid: No thyromegaly.      Vascular: No JVD.   Cardiovascular:      Rate and Rhythm: Normal rate. Rhythm irregular.      Heart sounds: Normal heart sounds. No murmur heard.     No friction rub. No gallop.   Pulmonary:      Effort: No respiratory distress.      Breath sounds: No stridor. No wheezing, rhonchi or rales.   Abdominal:      General: There is distension.      Palpations: There is no mass.      Tenderness: There is no abdominal tenderness. There is no guarding or rebound.      Hernia: No hernia is present.   Musculoskeletal:      Cervical back: Neck supple.      Comments: Sumit LE much improved, + hyperpigmentation   Skin:     Capillary Refill: Capillary refill takes less than 2 seconds.   Neurological:      Mental Status: He is alert and oriented to person, place, and time.      Motor: Weakness present.   Psychiatric:         Mood and Affect: Mood normal. Affect is flat.         Behavior: Behavior is slowed. Behavior is not agitated, aggressive or hyperactive.          Cognition and Memory: Cognition is impaired. Memory is impaired.         Judgment: Judgment is not impulsive or inappropriate.                Significant Labs: All pertinent labs within the past 24 hours have been reviewed.  Recent Lab Results         05/05/25  0419   05/04/25  1502   05/04/25  1316        Albumin 1.9     2.2       ALP 80     94       ALT 9     10       Ammonia 122     117       Anion Gap 8     9       Appearance, UA   Clear         AST 28     28       Bacteria, UA   None         Baso # 0.07     0.06       Basophil % 1.2     0.8       Bilirubin (UA)   Negative         BILIRUBIN TOTAL 3.8  Comment: For infants and newborns, interpretation of results should be based   on gestational age, weight and in agreement with clinical   observations.    Premature Infant recommended reference ranges:   0-24 hours:  <8.0 mg/dL   24-48 hours: <12.0 mg/dL   3-5 days:    <15.0 mg/dL   6-29 days:   <15.0 mg/dL     4.2  Comment: For infants and newborns, interpretation of results should be based   on gestational age, weight and in agreement with clinical   observations.    Premature Infant recommended reference ranges:   0-24 hours:  <8.0 mg/dL   24-48 hours: <12.0 mg/dL   3-5 days:    <15.0 mg/dL   6-29 days:   <15.0 mg/dL       BUN 36     36       Calcium 8.0     8.4       Chloride 92     90       CO2 30     31       Color, UA   Yellow         Creatinine 2.2     2.4       eGFR 31  Comment: Estimated GFR calculated using the CKD-EPI creatinine (2021) equation.     28  Comment: Estimated GFR calculated using the CKD-EPI creatinine (2021) equation.       Eos # 0.31     0.20       Eos % 5.2     2.7       Glucose 85     123       Glucose, UA   Negative         Gran # (ANC) 3.31     4.75       Hematocrit 28.9     31.7       Hemoglobin 10.1     10.8       Extra Tube   Hold for add-ons.  Comment: Auto resulted.            Hyaline Casts, UA   1         Immature Grans (Abs) 0.03  Comment: Mild elevation in immature  granulocytes is non specific and can be seen in a variety of conditions including stress response, acute inflammation, trauma and pregnancy. Correlation with other laboratory and clinical findings is essential.     0.04  Comment: Mild elevation in immature granulocytes is non specific and can be seen in a variety of conditions including stress response, acute inflammation, trauma and pregnancy. Correlation with other laboratory and clinical findings is essential.       Immature Granulocytes 0.5     0.5       Ketones, UA   Negative         Leukocyte Esterase, UA   Trace         Lipase     27       Lymph # 1.30     1.19       Lymph % 21.8     15.9       MCH 31.9     31.9       MCHC 34.9     34.1       MCV 91     94       Microscopic Comment     Comment: Other formed elements not mentioned in the report are not present in the microscopic examination.         Mono # 0.93     1.25       Mono % 15.6     16.7       MPV 11.6     11.9       Neut % 55.7     63.4       NITRITE UA   Negative         nRBC 0     0       NT-proBNP     1,414       Blood, UA   Negative         pH, UA   8.0         Platelet Count 103     95       Potassium 3.0     3.5       PROTEIN TOTAL 6.1     6.9       Protein, UA   Negative  Comment: Recommend a 24 hour urine protein or a urine protein/creatinine ratio if globulin induced proteinuria is clinically suspected.         RBC 3.17     3.39       RBC, UA   0         RDW 14.8     15.2       Sodium 130     130       Spec Grav UA   1.010         Squam Epithel, UA   0         Troponin I High Sensitivity     17       Urobilinogen, UA   1.0         WBC, UA   5         WBC 5.95     7.49               Significant Imaging: I have reviewed all pertinent imaging results/findings within the past 24 hours.

## 2025-05-05 NOTE — EICU
Virtual ICU Quality Rounds    Admit Date: 5/4/2025  Hospital Day: 0    ICU Day: 19h    24H Vital Sign Range:  Temp:  [97 °F (36.1 °C)-98.6 °F (37 °C)]   Pulse:  []   Resp:  [5-28]   BP: ()/(50-77)   SpO2:  [76 %-100 %]     VICU Screening    Daily review of  line necessity(optional): Central line(s) in place    Central line type (optional): Dialysis (tunneled)    Central line indications : Dialysis/CRRT        LDA reconciliation : Yes                Pressure injury prevention panel (order)

## 2025-05-05 NOTE — ASSESSMENT & PLAN NOTE
Patient has long standing persistent (>12 months) atrial fibrillation. Patient is currently in atrial fibrillation. [id number] Score: 1. The patients heart rate in the last 24 hours is as follows:  Pulse  Min: 70  Max: 100     Antiarrhythmics  metoprolol tartrate (LOPRESSOR) split tablet 12.5 mg, 2 times daily, Oral    Anticoagulants       Plan  Auto-anticoagulated.

## 2025-05-05 NOTE — ASSESSMENT & PLAN NOTE
Hyponatremia is likely due to Dehydration/hypovolemia. The patient's most recent sodium results are listed below.  Recent Labs     05/04/25  1316 05/05/25  0419   * 130*     Plan  - Correct the sodium by 4-6mEq in 24 hours.   - Obtain the following studies: None.  - Will treat the hyponatremia with Removal of offending medications  - Monitor sodium Daily.   - Patient hyponatremia is improving

## 2025-05-05 NOTE — ASSESSMENT & PLAN NOTE
Body mass index is 31.44 kg/m². Morbid obesity complicates all aspects of disease management from diagnostic modalities to treatment. Weight loss encouraged and health benefits explained to patient.

## 2025-05-05 NOTE — HPI
ED HPI:  69-year-old male with history of atrial fibrillation, liver cirrhosis presents to ED with son for complaints of confusion. Reports since Wednesday patient has been progressively becoming more confused and had had a cognitive decline. Reports that he has been displaying bizarre behavior at home and unable to identify normal objects. He is also seeing things that are not there. Reports last time this occurred his ammonia level was elevated. Reports the patient has not had an alcoholic beverage since December. Patient denies any headache, chest pain, shortness of breath. Denies any nausea or vomiting.    IM HPI:  Patient is well known to me and was recently admitted to our acute care facility with significant volume overload and anasarca.  We are able to diurese the patient and with metolazone had an excellent response and the patient was down from a wet weight of 290 lb to now 235 lb.  Patient's BUN and creatinine is about the same he has CKD and his bilirubin is in the 3-4 is which seems to be his baseline.  Patient states he has avoided alcohol he has been compliant with his lactulose he is having formed stools patient was unable to fill the Zaroxolyn.  Patient presented back to the ED with a mild hyponatremia hypotension and elevated ammonia levels.  This morning the patient is able to provide some history his altered mental status is improved patient has anasarca and edema is much improved.

## 2025-05-06 LAB
ABSOLUTE EOSINOPHIL (OHS): 0.3 K/UL
ABSOLUTE MONOCYTE (OHS): 0.95 K/UL (ref 0.3–1)
ABSOLUTE NEUTROPHIL COUNT (OHS): 3.46 K/UL (ref 1.8–7.7)
ALBUMIN SERPL BCP-MCNC: 2 G/DL (ref 3.5–5.2)
ALP SERPL-CCNC: 86 UNIT/L (ref 40–150)
ALT SERPL W/O P-5'-P-CCNC: 9 UNIT/L (ref 10–44)
ANION GAP (OHS): 9 MMOL/L (ref 8–16)
AST SERPL-CCNC: 25 UNIT/L (ref 11–45)
BASOPHILS # BLD AUTO: 0.05 K/UL
BASOPHILS NFR BLD AUTO: 0.8 %
BILIRUB SERPL-MCNC: 3.4 MG/DL (ref 0.1–1)
BUN SERPL-MCNC: 38 MG/DL (ref 8–23)
CALCIUM SERPL-MCNC: 8.2 MG/DL (ref 8.7–10.5)
CHLORIDE SERPL-SCNC: 93 MMOL/L (ref 95–110)
CO2 SERPL-SCNC: 30 MMOL/L (ref 23–29)
CREAT SERPL-MCNC: 2.2 MG/DL (ref 0.5–1.4)
ERYTHROCYTE [DISTWIDTH] IN BLOOD BY AUTOMATED COUNT: 14.8 % (ref 11.5–14.5)
GFR SERPLBLD CREATININE-BSD FMLA CKD-EPI: 31 ML/MIN/1.73/M2
GLUCOSE SERPL-MCNC: 105 MG/DL (ref 70–110)
HCT VFR BLD AUTO: 29.8 % (ref 40–54)
HGB BLD-MCNC: 10.4 GM/DL (ref 14–18)
IMM GRANULOCYTES # BLD AUTO: 0.05 K/UL (ref 0–0.04)
IMM GRANULOCYTES NFR BLD AUTO: 0.8 % (ref 0–0.5)
LYMPHOCYTES # BLD AUTO: 1.3 K/UL (ref 1–4.8)
MAGNESIUM SERPL-MCNC: 1.8 MG/DL (ref 1.6–2.6)
MCH RBC QN AUTO: 32.1 PG (ref 27–31)
MCHC RBC AUTO-ENTMCNC: 34.9 G/DL (ref 32–36)
MCV RBC AUTO: 92 FL (ref 82–98)
NUCLEATED RBC (/100WBC) (OHS): 0 /100 WBC
PLATELET # BLD AUTO: 104 K/UL (ref 150–450)
PMV BLD AUTO: 11.7 FL (ref 9.2–12.9)
POTASSIUM SERPL-SCNC: 3.2 MMOL/L (ref 3.5–5.1)
PROT SERPL-MCNC: 6.2 GM/DL (ref 6–8.4)
RBC # BLD AUTO: 3.24 M/UL (ref 4.6–6.2)
RELATIVE EOSINOPHIL (OHS): 4.9 %
RELATIVE LYMPHOCYTE (OHS): 21.3 % (ref 18–48)
RELATIVE MONOCYTE (OHS): 15.5 % (ref 4–15)
RELATIVE NEUTROPHIL (OHS): 56.7 % (ref 38–73)
SODIUM SERPL-SCNC: 132 MMOL/L (ref 136–145)
WBC # BLD AUTO: 6.11 K/UL (ref 3.9–12.7)

## 2025-05-06 PROCEDURE — 11000001 HC ACUTE MED/SURG PRIVATE ROOM

## 2025-05-06 PROCEDURE — 36415 COLL VENOUS BLD VENIPUNCTURE: CPT | Performed by: INTERNAL MEDICINE

## 2025-05-06 PROCEDURE — 80053 COMPREHEN METABOLIC PANEL: CPT | Performed by: INTERNAL MEDICINE

## 2025-05-06 PROCEDURE — 83735 ASSAY OF MAGNESIUM: CPT | Performed by: INTERNAL MEDICINE

## 2025-05-06 PROCEDURE — 97165 OT EVAL LOW COMPLEX 30 MIN: CPT

## 2025-05-06 PROCEDURE — 85025 COMPLETE CBC W/AUTO DIFF WBC: CPT | Performed by: INTERNAL MEDICINE

## 2025-05-06 PROCEDURE — 25000003 PHARM REV CODE 250: Performed by: INTERNAL MEDICINE

## 2025-05-06 RX ORDER — MUPIROCIN 20 MG/G
OINTMENT TOPICAL 2 TIMES DAILY
Status: DISCONTINUED | OUTPATIENT
Start: 2025-05-06 | End: 2025-05-07 | Stop reason: HOSPADM

## 2025-05-06 RX ADMIN — LACTULOSE 30 G: 20 SOLUTION ORAL at 09:05

## 2025-05-06 RX ADMIN — RIFAXIMIN 550 MG: 550 TABLET ORAL at 08:05

## 2025-05-06 RX ADMIN — TAMSULOSIN HYDROCHLORIDE 0.4 MG: 0.4 CAPSULE ORAL at 09:05

## 2025-05-06 RX ADMIN — LACTULOSE 30 G: 20 SOLUTION ORAL at 08:05

## 2025-05-06 RX ADMIN — RIFAXIMIN 550 MG: 550 TABLET ORAL at 09:05

## 2025-05-06 RX ADMIN — MUPIROCIN: 20 OINTMENT TOPICAL at 08:05

## 2025-05-06 RX ADMIN — LACTULOSE 30 G: 20 SOLUTION ORAL at 03:05

## 2025-05-06 RX ADMIN — MAGNESIUM 64 MG (MAGNESIUM CHLORIDE) TABLET,DELAYED RELEASE 128 MG: at 09:05

## 2025-05-06 RX ADMIN — POTASSIUM BICARBONATE 25 MEQ: 978 TABLET, EFFERVESCENT ORAL at 09:05

## 2025-05-06 RX ADMIN — METOPROLOL TARTRATE 12.5 MG: 25 TABLET, FILM COATED ORAL at 09:05

## 2025-05-06 RX ADMIN — METOPROLOL TARTRATE 12.5 MG: 25 TABLET, FILM COATED ORAL at 08:05

## 2025-05-06 RX ADMIN — POTASSIUM BICARBONATE 25 MEQ: 978 TABLET, EFFERVESCENT ORAL at 08:05

## 2025-05-06 RX ADMIN — SPIRONOLACTONE 50 MG: 25 TABLET ORAL at 09:05

## 2025-05-06 RX ADMIN — MUPIROCIN: 20 OINTMENT TOPICAL at 09:05

## 2025-05-06 RX ADMIN — FAMOTIDINE 20 MG: 20 TABLET, FILM COATED ORAL at 09:05

## 2025-05-06 NOTE — HOSPITAL COURSE
5/6 FM:  Patient this morning is getting back to his baseline.  Patient's confusion is improved he states he is feeling groggy from his sleeping medicines and would like to discontinue.  Patient we will see therapy today and I will ask General surgery to remove his HD catheter as this is no longer needed and would be an infectious risk.  Patient's mental health is improved we have again today discussed the importance of medication compliance.  I spoke to his family yesterday and they state that he has significant difficulty with compliance of medicines he missed doses etcetera they did purchase the Xifaxan for him and will add to his regimen.

## 2025-05-06 NOTE — PLAN OF CARE
05/06/25 1428   Medicare Message   Important Message from Medicare regarding Discharge Appeal Rights Given to patient/caregiver;Explained to patient/caregiver;Signed/date by patient/caregiver;Other (comments)  (completed with registration)   Date IMM was signed 05/06/25   Time IMM was signed [id number]

## 2025-05-06 NOTE — ASSESSMENT & PLAN NOTE
Patient has long standing persistent (>12 months) atrial fibrillation. Patient is currently in atrial fibrillation. [id number] Score: 1. The patients heart rate in the last 24 hours is as follows:  Pulse  Min: 71  Max: 99     Antiarrhythmics  metoprolol tartrate (LOPRESSOR) split tablet 12.5 mg, 2 times daily, Oral    Anticoagulants       Plan  Auto-anticoagulated.

## 2025-05-06 NOTE — PROGRESS NOTES
[facility] Medicine  Progress Note    Patient Name: Juan Carlos Yoo Sr.  MRN: [id number]  Patient Class: IP- Inpatient   Admission Date: 5/4/2025  Length of Stay: 1 days  Attending Physician: Joao Villegas III, MD  Primary Care Provider: Joao Villegas III, MD        Subjective     Principal Problem:Hyponatremia        HPI:  ED HPI:  69-year-old male with history of atrial fibrillation, liver cirrhosis presents to ED with son for complaints of confusion. Reports since Wednesday patient has been progressively becoming more confused and had had a cognitive decline. Reports that he has been displaying bizarre behavior at home and unable to identify normal objects. He is also seeing things that are not there. Reports last time this occurred his ammonia level was elevated. Reports the patient has not had an alcoholic beverage since December. Patient denies any headache, chest pain, shortness of breath. Denies any nausea or vomiting.    IM HPI:  Patient is well known to me and was recently admitted to our acute care facility with significant volume overload and anasarca.  We are able to diurese the patient and with metolazone had an excellent response and the patient was down from a wet weight of 290 lb to now 235 lb.  Patient's BUN and creatinine is about the same he has CKD and his bilirubin is in the 3-4 is which seems to be his baseline.  Patient states he has avoided alcohol he has been compliant with his lactulose he is having formed stools patient was unable to fill the Zaroxolyn.  Patient presented back to the ED with a mild hyponatremia hypotension and elevated ammonia levels.  This morning the patient is able to provide some history his altered mental status is improved patient has anasarca and edema is much improved.    Overview/Hospital Course:  5/6 FM:  Patient this morning is getting back to his baseline.  Patient's confusion is improved he states he is feeling groggy from his  "sleeping medicines and would like to discontinue.  Patient we will see therapy today and I will ask General surgery to remove his HD catheter as this is no longer needed and would be an infectious risk.  Patient's mental health is improved we have again today discussed the importance of medication compliance.  I spoke to his family yesterday and they state that he has significant difficulty with compliance of medicines he missed doses etcetera they did purchase the Xifaxan for him and will add to his regimen.    Past Medical History:   Diagnosis Date    Atrial fibrillation     Bulging disc     Cirrhosis     Colon polyp 12/29/2017    Rpt 5 yrs    Encounter for blood transfusion     EV (esophageal varices)     Hypertension 03/30/2023    Renal disorder     Skin cancer 03/2017    'NOSE"    Thrombocytopenia        Past Surgical History:   Procedure Laterality Date    CATHETERIZATION OF BOTH LEFT AND RIGHT HEART N/A 02/08/2023    Procedure: CATHETERIZATION, HEART, BOTH LEFT AND RIGHT;  Surgeon: Flo Malone MD;  Location: [facility] CATH LAB;  Service: Cardiology;  Laterality: N/A;  low bleeding risk 1.0%    CHOLECYSTECTOMY      COLONOSCOPY      COLONOSCOPY N/A 12/29/2017    ta rpt 2022    CORONARY ANGIOGRAPHY N/A 02/08/2023    Procedure: ANGIOGRAM, CORONARY ARTERY;  Surgeon: Flo Malone MD;  Location: [facility] CATH LAB;  Service: Cardiology;  Laterality: N/A;    HERNIA REPAIR      INCISION AND DRAINAGE Right 02/27/2025    Procedure: Incision and Drainage, thigh;  Surgeon: Kayla Foster MD;  Location: [facility] OR;  Service: General;  Laterality: Right;  Plan to go first if pt has cardiac clearance - SB    SKIN BIOPSY  03/2017    TONSILLECTOMY      UPPER GASTROINTESTINAL ENDOSCOPY  2011    EV    UPPER GASTROINTESTINAL ENDOSCOPY  2016    VASECTOMY         Review of patient's allergies indicates:  No Known Allergies    No current facility-administered medications on file prior to encounter.     Current Outpatient " Medications on File Prior to Encounter   Medication Sig    famotidine (PEPCID) 20 MG tablet Take 1 tablet (20 mg total) by mouth once daily.    furosemide (LASIX) 80 MG tablet Take 1 tablet (80 mg total) by mouth once daily. (Once reach dry weight may need adjustment)    lactulose (CHRONULAC) 20 gram/30 mL Soln Take 30 mLs (20 g total) by mouth 2 (two) times daily.    lactulose (CHRONULAC) 20 gram/30 mL Soln Take 45 mLs (30 g total) by mouth 2 (two) times daily.    magnesium chloride (MAG 64) 64 mg TbEC Take 2 tablets (128 mg total) by mouth once daily.    metOLazone (ZAROXOLYN) 5 MG tablet Take 1 tablet (5 mg total) by mouth every other day. (May need to reduce once reach dry weight)    metoprolol tartrate (LOPRESSOR) 50 MG tablet Take 1 tablet (50 mg total) by mouth 2 (two) times daily.    potassium bicarbonate (K-LYTE) disintegrating tablet Take 1 tablet (25 mEq total) by mouth once daily.    rifAXIMin (XIFAXAN) 550 mg Tab Take 1 tablet (550 mg total) by mouth 2 (two) times daily.    spironolactone (ALDACTONE) 50 MG tablet Take 1 tablet (50 mg total) by mouth once daily.    tamsulosin (FLOMAX) 0.4 mg Cap Take 1 capsule (0.4 mg total) by mouth once daily.    thiamine 100 MG tablet Take 100 mg by mouth once daily.     Family History       Problem Relation (Age of Onset)    Alzheimer's disease Mother    Cancer Maternal Uncle    Heart disease Maternal Uncle    Hyperlipidemia Brother    No Known Problems Father    Ovarian cancer Sister          Tobacco Use    Smoking status: Never     Passive exposure: Never    Smokeless tobacco: Never   Substance and Sexual Activity    Alcohol use: Not Currently     Alcohol/week: 0.0 standard drinks of alcohol    Drug use: No    Sexual activity: Not Currently     Review of Systems   Constitutional:  Positive for fatigue. Negative for chills and fever.   HENT:  Negative for congestion.    Eyes:  Negative for visual disturbance.   Respiratory:  Positive for shortness of breath.  Negative for wheezing.    Cardiovascular:  Negative for chest pain, palpitations and leg swelling.   Gastrointestinal:  Positive for abdominal distention. Negative for abdominal pain, blood in stool, constipation, diarrhea, nausea and vomiting.   Endocrine: Negative for polyuria.   Genitourinary:  Negative for dysuria and scrotal swelling.   Musculoskeletal:  Positive for gait problem. Negative for back pain.   Skin:  Negative for wound.   Neurological:  Positive for weakness.   Psychiatric/Behavioral:  Positive for confusion and hallucinations. The patient is not nervous/anxious.      Objective:     Vital Signs (Most Recent):  Temp: 97.8 °F (36.6 °C) (05/06/25 0400)  Pulse: 78 (05/06/25 0605)  Resp: 16 (05/06/25 0605)  BP: (!) 110/56 (05/06/25 0605)  SpO2: 98 % (05/06/25 0605) Vital Signs (24h Range):  Temp:  [97.2 °F (36.2 °C)-97.9 °F (36.6 °C)] 97.8 °F (36.6 °C)  Pulse:  [71-99] 78  Resp:  [14-21] 16  SpO2:  [96 %-100 %] 98 %  BP: ()/(39-65) 110/56     Weight: 108.1 kg (238 lb 5.1 oz)  Body mass index is 31.44 kg/m².     Physical Exam  Vitals and nursing note reviewed.   Constitutional:       General: He is not in acute distress.     Appearance: He is well-developed. He is obese. He is ill-appearing.   HENT:      Head: Normocephalic and atraumatic.      Nose: No congestion or rhinorrhea.   Eyes:      General: Scleral icterus present.         Right eye: No discharge.         Left eye: No discharge.      Extraocular Movements: Extraocular movements intact.   Neck:      Thyroid: No thyromegaly.      Vascular: No JVD.   Cardiovascular:      Rate and Rhythm: Normal rate. Rhythm irregular.      Heart sounds: Normal heart sounds. No murmur heard.     No friction rub. No gallop.   Pulmonary:      Effort: No respiratory distress.      Breath sounds: No stridor. No wheezing, rhonchi or rales.   Abdominal:      General: There is distension.      Palpations: There is no mass.      Tenderness: There is no abdominal  tenderness. There is no guarding or rebound.      Hernia: No hernia is present.   Musculoskeletal:      Cervical back: Neck supple.      Comments: Sumit LE much improved, + hyperpigmentation   Skin:     Capillary Refill: Capillary refill takes less than 2 seconds.   Neurological:      Mental Status: He is alert and oriented to person, place, and time.      Motor: Weakness present.   Psychiatric:         Mood and Affect: Mood normal. Affect is flat.         Behavior: Behavior is slowed. Behavior is not agitated, aggressive or hyperactive.         Cognition and Memory: Cognition is impaired. Memory is impaired.         Judgment: Judgment is not impulsive or inappropriate.                Significant Labs: All pertinent labs within the past 24 hours have been reviewed.  Recent Lab Results         05/06/25  0413        Albumin 2.0       ALP 86       ALT 9       Anion Gap 9       AST 25       Baso # 0.05       Basophil % 0.8       BILIRUBIN TOTAL 3.4  Comment: For infants and newborns, interpretation of results should be based   on gestational age, weight and in agreement with clinical   observations.    Premature Infant recommended reference ranges:   0-24 hours:  <8.0 mg/dL   24-48 hours: <12.0 mg/dL   3-5 days:    <15.0 mg/dL   6-29 days:   <15.0 mg/dL       BUN 38       Calcium 8.2       Chloride 93       CO2 30       Creatinine 2.2       eGFR 31  Comment: Estimated GFR calculated using the CKD-EPI creatinine (2021) equation.       Eos # 0.30       Eos % 4.9       Glucose 105       Gran # (ANC) 3.46       Hematocrit 29.8       Hemoglobin 10.4       Immature Grans (Abs) 0.05  Comment: Mild elevation in immature granulocytes is non specific and can be seen in a variety of conditions including stress response, acute inflammation, trauma and pregnancy. Correlation with other laboratory and clinical findings is essential.       Immature Granulocytes 0.8       Lymph # 1.30       Lymph % 21.3       Magnesium  1.8       MCH  32.1       MCHC 34.9       MCV 92       Mono # 0.95       Mono % 15.5       MPV 11.7       Neut % 56.7       nRBC 0       Platelet Count 104       Potassium 3.2       PROTEIN TOTAL 6.2       RBC 3.24       RDW 14.8       Sodium 132       WBC 6.11               Significant Imaging: I have reviewed all pertinent imaging results/findings within the past 24 hours.      Assessment & Plan  Hyponatremia  Hyponatremia is likely due to Dehydration/hypovolemia. The patient's most recent sodium results are listed below.  Recent Labs     05/04/25  1316 05/05/25  0419 05/06/25  0413   * 130* 132*     Plan  - Correct the sodium by 4-6mEq in 24 hours.   - Obtain the following studies: None.  - Will treat the hyponatremia with Removal of offending medications  - Monitor sodium Daily.   - Patient hyponatremia is improving  Cirrhosis    MELD-Na score calculated; MELD 3.0: 27 at 4/15/2025  5:36 AM  MELD-Na: 26 at 4/15/2025  5:36 AM  Calculated from:  Serum Creatinine: 2.2 mg/dL at 4/15/2025  5:36 AM  Serum Sodium: 135 mmol/L at 4/15/2025  5:36 AM  Total Bilirubin: 3.5 mg/dL at 4/15/2025  5:36 AM  Serum Albumin: 2 g/dL at 4/15/2025  5:36 AM  INR(ratio): 1.7 at 4/13/2025 12:42 PM  Age at listing (hypothetical): 69 years  Sex: Male at 4/15/2025  5:36 AM      Continue chronic meds. Etiology likely ETOH. Will avoid any hepatotoxic meds, and monitor CBC/CMP/INR for synthetic function.   Longstanding persistent atrial fibrillation  Patient has long standing persistent (>12 months) atrial fibrillation. Patient is currently in atrial fibrillation. [id number] Score: 1. The patients heart rate in the last 24 hours is as follows:  Pulse  Min: 71  Max: 99     Antiarrhythmics  metoprolol tartrate (LOPRESSOR) split tablet 12.5 mg, 2 times daily, Oral    Anticoagulants       Plan  Auto-anticoagulated.      CKD (chronic kidney disease) stage 4, GFR 15-29 ml/min  Creatine stable for now. BMP reviewed- noted Estimated Creatinine Clearance: 39.7  mL/min (A) (based on SCr of 2.2 mg/dL (H)). according to latest data. Based on current GFR, CKD stage is stage 4 - GFR 15-29.  Monitor UOP and serial BMP and adjust therapy as needed. Renally dose meds. Avoid nephrotoxic medications and procedures.    5/6 FM:  Will see if GS can remove HD catheter today or tomorrow am.  Hepatic encephalopathy  Was not able to fill xifaxin, lactulose alone seems to not be enough?  Will titrate dose as he was having formed stools.    5/6 FM:  Improved, home soon.    Hypotension  Chronic.    Moderate protein-calorie malnutrition  Nutrition consulted. Most recent weight and BMI monitored-     Measurements:  Wt Readings from Last 1 Encounters:   05/04/25 108.1 kg (238 lb 5.1 oz)   Body mass index is 31.44 kg/m².    Patient has been screened and assessed by RD.    Malnutrition Type:  Context:    Level:      Malnutrition Characteristic Summary:       Interventions/Recommendations (treatment strategy):       Morbid obesity  Body mass index is 31.44 kg/m². Morbid obesity complicates all aspects of disease management from diagnostic modalities to treatment. Weight loss encouraged and health benefits explained to patient.       Hypokalemia  Patient's most recent potassium results are listed below.   Recent Labs     05/04/25  1316 05/05/25  0419 05/06/25  0413   K 3.5 3.0* 3.2*     Plan  - Replete potassium per protocol  - Monitor potassium Daily  - Patient's hypokalemia is improving  VTE Risk Mitigation (From admission, onward)           Ordered     IP VTE HIGH RISK PATIENT  Once         05/04/25 1645     Place sequential compression device  Until discontinued         05/04/25 1645                    Discharge Planning   AUTUMN:      Code Status: Full Code   Medical Readiness for Discharge Date:   Discharge Plan A: Home with family, Home Health                Please place Justification for DME        Joao Villegas III, MD  Department of Hospital Medicine   [place] [facility]

## 2025-05-06 NOTE — EICU
Intervention Initiated From:  COR / ALONDRA Ojeda intervened regarding:  Other    VICU Night Rounds Checklist  24H Vital Sign Range:  Temp:  [97 °F (36.1 °C)-97.5 °F (36.4 °C)]   Pulse:  []   Resp:  [14-28]   BP: ()/(50-73)   SpO2:  [94 %-100 %]     LDA reconciliation

## 2025-05-06 NOTE — PLAN OF CARE
Problem: Adult Inpatient Plan of Care  Goal: Plan of Care Review  Outcome: Progressing  Goal: Patient-Specific Goal (Individualized)  Outcome: Progressing  Goal: Absence of Hospital-Acquired Illness or Injury  Outcome: Progressing  Goal: Optimal Comfort and Wellbeing  Outcome: Progressing  Goal: Readiness for Transition of Care  Outcome: Progressing     Problem: Acute Kidney Injury/Impairment  Goal: Fluid and Electrolyte Balance  Outcome: Progressing  Goal: Improved Oral Intake  Outcome: Progressing  Goal: Effective Renal Function  Outcome: Progressing     Problem: Infection  Goal: Absence of Infection Signs and Symptoms  Outcome: Progressing     Problem: Liver Failure  Goal: Optimal Coping with Liver Failure  Outcome: Progressing  Goal: Fluid and Electrolyte Balance  Outcome: Progressing  Goal: Optimal Gastrointestinal Function  Outcome: Progressing  Goal: Blood Glucose Level Within Target Range  Outcome: Progressing  Goal: Optimal Coagulation Function  Outcome: Progressing  Goal: Absence of Infection Signs and Symptoms  Outcome: Progressing  Goal: Optimal Neurologic Function  Outcome: Progressing  Goal: Improved Oral Intake  Outcome: Progressing  Goal: Optimal Pain Control, Comfort and Function  Outcome: Progressing  Goal: Optimize Renal Function  Outcome: Progressing  Goal: Effective Oxygenation and Ventilation  Outcome: Progressing

## 2025-05-06 NOTE — ASSESSMENT & PLAN NOTE
Patient's most recent potassium results are listed below.   Recent Labs     05/04/25  1316 05/05/25  0419 05/06/25  0413   K 3.5 3.0* 3.2*     Plan  - Replete potassium per protocol  - Monitor potassium Daily  - Patient's hypokalemia is improving

## 2025-05-06 NOTE — ASSESSMENT & PLAN NOTE
Hyponatremia is likely due to Dehydration/hypovolemia. The patient's most recent sodium results are listed below.  Recent Labs     05/04/25  1316 05/05/25  0419 05/06/25  0413   * 130* 132*     Plan  - Correct the sodium by 4-6mEq in 24 hours.   - Obtain the following studies: None.  - Will treat the hyponatremia with Removal of offending medications  - Monitor sodium Daily.   - Patient hyponatremia is improving

## 2025-05-06 NOTE — PT/OT/SLP EVAL
Occupational Therapy   Evaluation and Discharge Note    Name: Juan Carlos Yoo Sr.  MRN: [id number]  Admitting Diagnosis: Hyponatremia  Recent Surgery: * No surgery found *      Recommendations:     Discharge Recommendations: No Therapy Indicated  Discharge Equipment Recommendations: none  Barriers to discharge:  Other (Comment) (Medical status)    Assessment:     Juan Carlos Yoo Sr. is a 69 y.o. male with a medical diagnosis of Hyponatremia. At this time, patient is functioning at their prior level of function and does not require further acute OT services.     Plan:     During this hospitalization, patient does not require further acute OT services.  Please re-consult if situation changes.    Plan of Care Reviewed with: patient    Subjective     Chief Complaint: none  Patient/Family Comments/goals: Pt would like to return home.    Occupational Profile:  Living Environment: Pt lives with his son in a mobile home with ramp to enter/exit.   Previous level of function: Independent  Roles and Routines: ADL's and light IADL's  Equipment Used at home: wheelchair, walker, rolling, cane, straight, shower chair (But, has not been utilizing at home recently.)  Assistance upon Discharge: Family, but son is away fro work frequently    Pain/Comfort:  Pain Rating 1: 0/10  Pain Rating Post-Intervention 1: 0/10    Patients cultural, spiritual, Adventism conflicts given the current situation: no    Objective:     Communicated with: nurse prior to session.  Patient found HOB elevated with telemetry, pulse ox (continuous), peripheral IV, blood pressure cuff upon OT entry to room.    General Precautions: Standard, fall  Orthopedic Precautions: N/A  Braces: N/A  Respiratory Status: Room air     Occupational Performance:    Bed Mobility:    Patient completed Rolling/Turning to Left with  independence  Patient completed Rolling/Turning to Right with independence  Patient completed Scooting/Bridging with independence  Patient completed Supine to  Sit with independence  Patient completed Sit to Supine with independence    Functional Mobility/Transfers:  Patient completed Sit <> Stand Transfer with independence  with  no assistive device   Patient completed Bed <> Chair Transfer using Step Transfer technique with independence with no assistive device  Patient completed Toilet Transfer Step Transfer technique with modified independence with  grab bars  Functional Mobility: Pt ambulated 175' between surfaces without AD or LOB.    Activities of Daily Living:  Feeding:  independence    Grooming: independence    Bathing: modified independence    Upper Body Dressing: independence    Lower Body Dressing: independence    Toileting: modified independence      Cognitive/Visual Perceptual:  Cognitive/Psychosocial Skills:  -       Oriented to: Person, Place, and Situation   -       Follows Commands/attention:Follows multistep  commands  -       Communication: clear/fluent  -       Memory: No Deficits noted  -       Safety awareness/insight to disability: intact   -       Mood/Affect/Coping skills/emotional control: Cooperative and Pleasant    Physical Exam:  Postural examination/scapula alignment: -       Rounded shoulders  Sensation: -       Intact  Dominant hand: -       Right  Upper Extremity Range of Motion:  -       Right Upper Extremity: WFL  -       Left Upper Extremity: WFL  Upper Extremity Strength: -       Right Upper Extremity: WFL  -       Left Upper Extremity: WFL  Fine Motor Coordination: -       Intact  Gross motor coordination: WFL    AMPAC 6 Click ADL:  AMPAC Total Score: 24    Patient left HOB elevated with all lines intact, call button in reach, and nurse notified    GOALS:   Evaluation only      DME Justifications:  No DME recommended requiring DME justifications    History:     Past Medical History:   Diagnosis Date    Atrial fibrillation     Bulging disc     Cirrhosis     Colon polyp 12/29/2017    Rpt 5 yrs    Encounter for blood transfusion     EV  "(esophageal varices)     Hypertension 03/30/2023    Renal disorder     Skin cancer 03/2017    'NOSE"    Thrombocytopenia          Past Surgical History:   Procedure Laterality Date    CATHETERIZATION OF BOTH LEFT AND RIGHT HEART N/A 02/08/2023    Procedure: CATHETERIZATION, HEART, BOTH LEFT AND RIGHT;  Surgeon: Flo Malone MD;  Location: [facility] CATH LAB;  Service: Cardiology;  Laterality: N/A;  low bleeding risk 1.0%    CHOLECYSTECTOMY      COLONOSCOPY      COLONOSCOPY N/A 12/29/2017    ta rpt 2022    CORONARY ANGIOGRAPHY N/A 02/08/2023    Procedure: ANGIOGRAM, CORONARY ARTERY;  Surgeon: Flo Malone MD;  Location: [facility] CATH LAB;  Service: Cardiology;  Laterality: N/A;    HERNIA REPAIR      INCISION AND DRAINAGE Right 02/27/2025    Procedure: Incision and Drainage, thigh;  Surgeon: Kayla Foster MD;  Location: [facility];  Service: General;  Laterality: Right;  Plan to go first if pt has cardiac clearance - SB    SKIN BIOPSY  03/2017    TONSILLECTOMY      UPPER GASTROINTESTINAL ENDOSCOPY  2011    EV    UPPER GASTROINTESTINAL ENDOSCOPY  2016    VASECTOMY         Time Tracking:     OT Date of Treatment: 05/06/25  OT Start Time: 1050  OT Stop Time: 1118  OT Total Time (min): 28 min    Billable Minutes:Evaluation 28    5/6/2025  "

## 2025-05-06 NOTE — ASSESSMENT & PLAN NOTE
Was not able to fill xifaxin, lactulose alone seems to not be enough?  Will titrate dose as he was having formed stools.    5/6 FM:  Improved, home soon.

## 2025-05-06 NOTE — SUBJECTIVE & OBJECTIVE
"Past Medical History:   Diagnosis Date    Atrial fibrillation     Bulging disc     Cirrhosis     Colon polyp 12/29/2017    Rpt 5 yrs    Encounter for blood transfusion     EV (esophageal varices)     Hypertension 03/30/2023    Renal disorder     Skin cancer 03/2017    'NOSE"    Thrombocytopenia        Past Surgical History:   Procedure Laterality Date    CATHETERIZATION OF BOTH LEFT AND RIGHT HEART N/A 02/08/2023    Procedure: CATHETERIZATION, HEART, BOTH LEFT AND RIGHT;  Surgeon: Flo Malone MD;  Location: [facility] CATH LAB;  Service: Cardiology;  Laterality: N/A;  low bleeding risk 1.0%    CHOLECYSTECTOMY      COLONOSCOPY      COLONOSCOPY N/A 12/29/2017    ta rpt 2022    CORONARY ANGIOGRAPHY N/A 02/08/2023    Procedure: ANGIOGRAM, CORONARY ARTERY;  Surgeon: Flo Malone MD;  Location: [facility] CATH LAB;  Service: Cardiology;  Laterality: N/A;    HERNIA REPAIR      INCISION AND DRAINAGE Right 02/27/2025    Procedure: Incision and Drainage, thigh;  Surgeon: Kayla Foster MD;  Location: [facility];  Service: General;  Laterality: Right;  Plan to go first if pt has cardiac clearance - SB    SKIN BIOPSY  03/2017    TONSILLECTOMY      UPPER GASTROINTESTINAL ENDOSCOPY  2011    EV    UPPER GASTROINTESTINAL ENDOSCOPY  2016    VASECTOMY         Review of patient's allergies indicates:  No Known Allergies    No current facility-administered medications on file prior to encounter.     Current Outpatient Medications on File Prior to Encounter   Medication Sig    famotidine (PEPCID) 20 MG tablet Take 1 tablet (20 mg total) by mouth once daily.    furosemide (LASIX) 80 MG tablet Take 1 tablet (80 mg total) by mouth once daily. (Once reach dry weight may need adjustment)    lactulose (CHRONULAC) 20 gram/30 mL Soln Take 30 mLs (20 g total) by mouth 2 (two) times daily.    lactulose (CHRONULAC) 20 gram/30 mL Soln Take 45 mLs (30 g total) by mouth 2 (two) times daily.    magnesium chloride (MAG 64) 64 mg TbEC Take 2 tablets " (128 mg total) by mouth once daily.    metOLazone (ZAROXOLYN) 5 MG tablet Take 1 tablet (5 mg total) by mouth every other day. (May need to reduce once reach dry weight)    metoprolol tartrate (LOPRESSOR) 50 MG tablet Take 1 tablet (50 mg total) by mouth 2 (two) times daily.    potassium bicarbonate (K-LYTE) disintegrating tablet Take 1 tablet (25 mEq total) by mouth once daily.    rifAXIMin (XIFAXAN) 550 mg Tab Take 1 tablet (550 mg total) by mouth 2 (two) times daily.    spironolactone (ALDACTONE) 50 MG tablet Take 1 tablet (50 mg total) by mouth once daily.    tamsulosin (FLOMAX) 0.4 mg Cap Take 1 capsule (0.4 mg total) by mouth once daily.    thiamine 100 MG tablet Take 100 mg by mouth once daily.     Family History       Problem Relation (Age of Onset)    Alzheimer's disease Mother    Cancer Maternal Uncle    Heart disease Maternal Uncle    Hyperlipidemia Brother    No Known Problems Father    Ovarian cancer Sister          Tobacco Use    Smoking status: Never     Passive exposure: Never    Smokeless tobacco: Never   Substance and Sexual Activity    Alcohol use: Not Currently     Alcohol/week: 0.0 standard drinks of alcohol    Drug use: No    Sexual activity: Not Currently     Review of Systems   Constitutional:  Positive for fatigue. Negative for chills and fever.   HENT:  Negative for congestion.    Eyes:  Negative for visual disturbance.   Respiratory:  Positive for shortness of breath. Negative for wheezing.    Cardiovascular:  Negative for chest pain, palpitations and leg swelling.   Gastrointestinal:  Positive for abdominal distention. Negative for abdominal pain, blood in stool, constipation, diarrhea, nausea and vomiting.   Endocrine: Negative for polyuria.   Genitourinary:  Negative for dysuria and scrotal swelling.   Musculoskeletal:  Positive for gait problem. Negative for back pain.   Skin:  Negative for wound.   Neurological:  Positive for weakness.   Psychiatric/Behavioral:  Positive for confusion  and hallucinations. The patient is not nervous/anxious.      Objective:     Vital Signs (Most Recent):  Temp: 97.8 °F (36.6 °C) (05/06/25 0400)  Pulse: 78 (05/06/25 0605)  Resp: 16 (05/06/25 0605)  BP: (!) 110/56 (05/06/25 0605)  SpO2: 98 % (05/06/25 0605) Vital Signs (24h Range):  Temp:  [97.2 °F (36.2 °C)-97.9 °F (36.6 °C)] 97.8 °F (36.6 °C)  Pulse:  [71-99] 78  Resp:  [14-21] 16  SpO2:  [96 %-100 %] 98 %  BP: ()/(39-65) 110/56     Weight: 108.1 kg (238 lb 5.1 oz)  Body mass index is 31.44 kg/m².     Physical Exam  Vitals and nursing note reviewed.   Constitutional:       General: He is not in acute distress.     Appearance: He is well-developed. He is obese. He is ill-appearing.   HENT:      Head: Normocephalic and atraumatic.      Nose: No congestion or rhinorrhea.   Eyes:      General: Scleral icterus present.         Right eye: No discharge.         Left eye: No discharge.      Extraocular Movements: Extraocular movements intact.   Neck:      Thyroid: No thyromegaly.      Vascular: No JVD.   Cardiovascular:      Rate and Rhythm: Normal rate. Rhythm irregular.      Heart sounds: Normal heart sounds. No murmur heard.     No friction rub. No gallop.   Pulmonary:      Effort: No respiratory distress.      Breath sounds: No stridor. No wheezing, rhonchi or rales.   Abdominal:      General: There is distension.      Palpations: There is no mass.      Tenderness: There is no abdominal tenderness. There is no guarding or rebound.      Hernia: No hernia is present.   Musculoskeletal:      Cervical back: Neck supple.      Comments: Sumit LE much improved, + hyperpigmentation   Skin:     Capillary Refill: Capillary refill takes less than 2 seconds.   Neurological:      Mental Status: He is alert and oriented to person, place, and time.      Motor: Weakness present.   Psychiatric:         Mood and Affect: Mood normal. Affect is flat.         Behavior: Behavior is slowed. Behavior is not agitated, aggressive or  hyperactive.         Cognition and Memory: Cognition is impaired. Memory is impaired.         Judgment: Judgment is not impulsive or inappropriate.                Significant Labs: All pertinent labs within the past 24 hours have been reviewed.  Recent Lab Results         05/06/25  0413        Albumin 2.0       ALP 86       ALT 9       Anion Gap 9       AST 25       Baso # 0.05       Basophil % 0.8       BILIRUBIN TOTAL 3.4  Comment: For infants and newborns, interpretation of results should be based   on gestational age, weight and in agreement with clinical   observations.    Premature Infant recommended reference ranges:   0-24 hours:  <8.0 mg/dL   24-48 hours: <12.0 mg/dL   3-5 days:    <15.0 mg/dL   6-29 days:   <15.0 mg/dL       BUN 38       Calcium 8.2       Chloride 93       CO2 30       Creatinine 2.2       eGFR 31  Comment: Estimated GFR calculated using the CKD-EPI creatinine (2021) equation.       Eos # 0.30       Eos % 4.9       Glucose 105       Gran # (ANC) 3.46       Hematocrit 29.8       Hemoglobin 10.4       Immature Grans (Abs) 0.05  Comment: Mild elevation in immature granulocytes is non specific and can be seen in a variety of conditions including stress response, acute inflammation, trauma and pregnancy. Correlation with other laboratory and clinical findings is essential.       Immature Granulocytes 0.8       Lymph # 1.30       Lymph % 21.3       Magnesium  1.8       MCH 32.1       MCHC 34.9       MCV 92       Mono # 0.95       Mono % 15.5       MPV 11.7       Neut % 56.7       nRBC 0       Platelet Count 104       Potassium 3.2       PROTEIN TOTAL 6.2       RBC 3.24       RDW 14.8       Sodium 132       WBC 6.11               Significant Imaging: I have reviewed all pertinent imaging results/findings within the past 24 hours.

## 2025-05-06 NOTE — ASSESSMENT & PLAN NOTE
Creatine stable for now. BMP reviewed- noted Estimated Creatinine Clearance: 39.7 mL/min (A) (based on SCr of 2.2 mg/dL (H)). according to latest data. Based on current GFR, CKD stage is stage 4 - GFR 15-29.  Monitor UOP and serial BMP and adjust therapy as needed. Renally dose meds. Avoid nephrotoxic medications and procedures.    5/6 FM:  Will see if [facility] can remove HD catheter today or tomorrow am.

## 2025-05-07 VITALS
HEIGHT: 73 IN | BODY MASS INDEX: 31.59 KG/M2 | RESPIRATION RATE: 18 BRPM | DIASTOLIC BLOOD PRESSURE: 96 MMHG | HEART RATE: 79 BPM | OXYGEN SATURATION: 100 % | TEMPERATURE: 98 F | WEIGHT: 238.31 LBS | SYSTOLIC BLOOD PRESSURE: 106 MMHG

## 2025-05-07 LAB
ABSOLUTE EOSINOPHIL (OHS): 0.26 K/UL
ABSOLUTE MONOCYTE (OHS): 1.03 K/UL (ref 0.3–1)
ABSOLUTE NEUTROPHIL COUNT (OHS): 4.08 K/UL (ref 1.8–7.7)
ALBUMIN SERPL BCP-MCNC: 1.9 G/DL (ref 3.5–5.2)
ALP SERPL-CCNC: 92 UNIT/L (ref 40–150)
ALT SERPL W/O P-5'-P-CCNC: 9 UNIT/L (ref 10–44)
AMMONIA PLAS-SCNC: 70 UMOL/L (ref 10–50)
ANION GAP (OHS): 8 MMOL/L (ref 8–16)
AST SERPL-CCNC: 26 UNIT/L (ref 11–45)
BASOPHILS # BLD AUTO: 0.05 K/UL
BASOPHILS NFR BLD AUTO: 0.8 %
BILIRUB SERPL-MCNC: 3.3 MG/DL (ref 0.1–1)
BUN SERPL-MCNC: 38 MG/DL (ref 8–23)
CALCIUM SERPL-MCNC: 8 MG/DL (ref 8.7–10.5)
CHLORIDE SERPL-SCNC: 93 MMOL/L (ref 95–110)
CO2 SERPL-SCNC: 28 MMOL/L (ref 23–29)
CREAT SERPL-MCNC: 2 MG/DL (ref 0.5–1.4)
ERYTHROCYTE [DISTWIDTH] IN BLOOD BY AUTOMATED COUNT: 15.1 % (ref 11.5–14.5)
GFR SERPLBLD CREATININE-BSD FMLA CKD-EPI: 35 ML/MIN/1.73/M2
GLUCOSE SERPL-MCNC: 94 MG/DL (ref 70–110)
HCT VFR BLD AUTO: 29.6 % (ref 40–54)
HGB BLD-MCNC: 10.1 GM/DL (ref 14–18)
IMM GRANULOCYTES # BLD AUTO: 0.05 K/UL (ref 0–0.04)
IMM GRANULOCYTES NFR BLD AUTO: 0.8 % (ref 0–0.5)
LYMPHOCYTES # BLD AUTO: 1.19 K/UL (ref 1–4.8)
MAGNESIUM SERPL-MCNC: 1.9 MG/DL (ref 1.6–2.6)
MCH RBC QN AUTO: 31.2 PG (ref 27–31)
MCHC RBC AUTO-ENTMCNC: 34.1 G/DL (ref 32–36)
MCV RBC AUTO: 91 FL (ref 82–98)
NUCLEATED RBC (/100WBC) (OHS): 0 /100 WBC
PLATELET # BLD AUTO: 106 K/UL (ref 150–450)
PMV BLD AUTO: 11 FL (ref 9.2–12.9)
POTASSIUM SERPL-SCNC: 3.5 MMOL/L (ref 3.5–5.1)
PROT SERPL-MCNC: 6.2 GM/DL (ref 6–8.4)
RBC # BLD AUTO: 3.24 M/UL (ref 4.6–6.2)
RELATIVE EOSINOPHIL (OHS): 3.9 %
RELATIVE LYMPHOCYTE (OHS): 17.9 % (ref 18–48)
RELATIVE MONOCYTE (OHS): 15.5 % (ref 4–15)
RELATIVE NEUTROPHIL (OHS): 61.1 % (ref 38–73)
SODIUM SERPL-SCNC: 129 MMOL/L (ref 136–145)
WBC # BLD AUTO: 6.66 K/UL (ref 3.9–12.7)

## 2025-05-07 PROCEDURE — 83735 ASSAY OF MAGNESIUM: CPT | Performed by: INTERNAL MEDICINE

## 2025-05-07 PROCEDURE — 85025 COMPLETE CBC W/AUTO DIFF WBC: CPT | Performed by: INTERNAL MEDICINE

## 2025-05-07 PROCEDURE — 25000003 PHARM REV CODE 250: Performed by: INTERNAL MEDICINE

## 2025-05-07 PROCEDURE — 99222 1ST HOSP IP/OBS MODERATE 55: CPT | Mod: ,,, | Performed by: STUDENT IN AN ORGANIZED HEALTH CARE EDUCATION/TRAINING PROGRAM

## 2025-05-07 PROCEDURE — 0JPT03Z REMOVAL OF INFUSION DEVICE FROM TRUNK SUBCUTANEOUS TISSUE AND FASCIA, OPEN APPROACH: ICD-10-PCS | Performed by: STUDENT IN AN ORGANIZED HEALTH CARE EDUCATION/TRAINING PROGRAM

## 2025-05-07 PROCEDURE — 36415 COLL VENOUS BLD VENIPUNCTURE: CPT | Performed by: INTERNAL MEDICINE

## 2025-05-07 PROCEDURE — 82140 ASSAY OF AMMONIA: CPT | Performed by: INTERNAL MEDICINE

## 2025-05-07 PROCEDURE — 02PY33Z REMOVAL OF INFUSION DEVICE FROM GREAT VESSEL, PERCUTANEOUS APPROACH: ICD-10-PCS | Performed by: STUDENT IN AN ORGANIZED HEALTH CARE EDUCATION/TRAINING PROGRAM

## 2025-05-07 PROCEDURE — 80053 COMPREHEN METABOLIC PANEL: CPT | Performed by: INTERNAL MEDICINE

## 2025-05-07 RX ORDER — METOPROLOL TARTRATE 25 MG/1
25 TABLET, FILM COATED ORAL 2 TIMES DAILY
Qty: 180 TABLET | Refills: 0 | Status: SHIPPED | OUTPATIENT
Start: 2025-05-07

## 2025-05-07 RX ORDER — METOLAZONE 5 MG/1
TABLET ORAL
Start: 2025-05-07

## 2025-05-07 RX ADMIN — RIFAXIMIN 550 MG: 550 TABLET ORAL at 08:05

## 2025-05-07 RX ADMIN — TAMSULOSIN HYDROCHLORIDE 0.4 MG: 0.4 CAPSULE ORAL at 09:05

## 2025-05-07 RX ADMIN — POTASSIUM BICARBONATE 25 MEQ: 978 TABLET, EFFERVESCENT ORAL at 08:05

## 2025-05-07 RX ADMIN — LACTULOSE 30 G: 20 SOLUTION ORAL at 09:05

## 2025-05-07 RX ADMIN — RIFAXIMIN 550 MG: 550 TABLET ORAL at 09:05

## 2025-05-07 RX ADMIN — MUPIROCIN: 20 OINTMENT TOPICAL at 09:05

## 2025-05-07 RX ADMIN — MAGNESIUM 64 MG (MAGNESIUM CHLORIDE) TABLET,DELAYED RELEASE 128 MG: at 09:05

## 2025-05-07 RX ADMIN — SPIRONOLACTONE 50 MG: 25 TABLET ORAL at 09:05

## 2025-05-07 RX ADMIN — METOPROLOL TARTRATE 12.5 MG: 25 TABLET, FILM COATED ORAL at 09:05

## 2025-05-07 RX ADMIN — LACTULOSE 30 G: 20 SOLUTION ORAL at 08:05

## 2025-05-07 RX ADMIN — POTASSIUM BICARBONATE 25 MEQ: 978 TABLET, EFFERVESCENT ORAL at 09:05

## 2025-05-07 RX ADMIN — FAMOTIDINE 20 MG: 20 TABLET, FILM COATED ORAL at 09:05

## 2025-05-07 NOTE — NURSING
Received report from JAZMYNE Davis in ICU. Patient came in a wheelchair and able to ambulate to the bed. Patient placed on cardiac monitoring. Urinal at bedside. Call bell in reach. Patient educated to call if in need using call bell system.

## 2025-05-07 NOTE — DISCHARGE INSTRUCTIONS
Our goal at [facility] is to always give you outstanding care and exceptional service. You may receive a survey from [employer] by mail, text or e-mail in the next 24-48 hours asking about the care you received with us. The survey should only take 5-10 minutes to complete and is very important to us.     Your feedback provides us with a way to recognize our staff who work tirelessly to provide the best care! Also, your responses help us learn how to improve when your experience was below our aspiration of excellence. We are always looking for ways to improve your stay. We WILL use your feedback to continue making improvements to help us provide the highest quality care. We keep your personal information and feedback confidential. We appreciate your time completing this survey and can't wait to hear from you!!!    We look forward to your continued care with us! Thanks so much for choosing [facility] for your healthcare needs!

## 2025-05-08 NOTE — ASSESSMENT & PLAN NOTE
HD catheter removed - pt tolerated well  Follow up in [facility] clinic in two weeks   Nursing staff instructed to check site for hematoma prior to discharge later this evening

## 2025-05-08 NOTE — HPI
70yo male with history of CKD who presents for TDC removal.  Currently not requiring dialysis and request for removal given per IM and nephrology.

## 2025-05-08 NOTE — SUBJECTIVE & OBJECTIVE
"No current facility-administered medications on file prior to encounter.     Current Outpatient Medications on File Prior to Encounter   Medication Sig    famotidine (PEPCID) 20 MG tablet Take 1 tablet (20 mg total) by mouth once daily.    furosemide (LASIX) 80 MG tablet Take 1 tablet (80 mg total) by mouth once daily. (Once reach dry weight may need adjustment)    magnesium chloride (MAG 64) 64 mg TbEC Take 2 tablets (128 mg total) by mouth once daily.    potassium bicarbonate (K-LYTE) disintegrating tablet Take 1 tablet (25 mEq total) by mouth once daily.    rifAXIMin (XIFAXAN) 550 mg Tab Take 1 tablet (550 mg total) by mouth 2 (two) times daily.    spironolactone (ALDACTONE) 50 MG tablet Take 1 tablet (50 mg total) by mouth once daily.    tamsulosin (FLOMAX) 0.4 mg Cap Take 1 capsule (0.4 mg total) by mouth once daily.    [DISCONTINUED] lactulose (CHRONULAC) 20 gram/30 mL Soln Take 30 mLs (20 g total) by mouth 2 (two) times daily.    [DISCONTINUED] lactulose (CHRONULAC) 20 gram/30 mL Soln Take 45 mLs (30 g total) by mouth 2 (two) times daily.    [DISCONTINUED] metOLazone (ZAROXOLYN) 5 MG tablet Take 1 tablet (5 mg total) by mouth every other day. (May need to reduce once reach dry weight)    [DISCONTINUED] metoprolol tartrate (LOPRESSOR) 50 MG tablet Take 1 tablet (50 mg total) by mouth 2 (two) times daily.    [DISCONTINUED] thiamine 100 MG tablet Take 100 mg by mouth once daily.       Review of patient's allergies indicates:  No Known Allergies    Past Medical History:   Diagnosis Date    Atrial fibrillation     Bulging disc     Cirrhosis     Colon polyp 12/29/2017    Rpt 5 yrs    Encounter for blood transfusion     EV (esophageal varices)     Hypertension 03/30/2023    Renal disorder     Skin cancer 03/2017    'NOSE"    Thrombocytopenia      Past Surgical History:   Procedure Laterality Date    CATHETERIZATION OF BOTH LEFT AND RIGHT HEART N/A 02/08/2023    Procedure: CATHETERIZATION, HEART, BOTH LEFT AND RIGHT; "  Surgeon: Flo Malone MD;  Location: [facility] CATH LAB;  Service: Cardiology;  Laterality: N/A;  low bleeding risk 1.0%    CHOLECYSTECTOMY      COLONOSCOPY      COLONOSCOPY N/A 12/29/2017    ta rpt 2022    CORONARY ANGIOGRAPHY N/A 02/08/2023    Procedure: ANGIOGRAM, CORONARY ARTERY;  Surgeon: Flo Malone MD;  Location: [facility] CATH LAB;  Service: Cardiology;  Laterality: N/A;    HERNIA REPAIR      INCISION AND DRAINAGE Right 02/27/2025    Procedure: Incision and Drainage, thigh;  Surgeon: Kayla Foster MD;  Location: [facility] OR;  Service: General;  Laterality: Right;  Plan to go first if pt has cardiac clearance - SB    SKIN BIOPSY  03/2017    TONSILLECTOMY      UPPER GASTROINTESTINAL ENDOSCOPY  2011    EV    UPPER GASTROINTESTINAL ENDOSCOPY  2016    VASECTOMY       Family History       Problem Relation (Age of Onset)    Alzheimer's disease Mother    Cancer Maternal Uncle    Heart disease Maternal Uncle    Hyperlipidemia Brother    No Known Problems Father    Ovarian cancer Sister          Tobacco Use    Smoking status: Never     Passive exposure: Never    Smokeless tobacco: Never   Substance and Sexual Activity    Alcohol use: Not Currently     Alcohol/week: 0.0 standard drinks of alcohol    Drug use: No    Sexual activity: Not Currently     Review of Systems   Constitutional:  Negative for activity change, appetite change, chills and fever.   Respiratory:  Negative for chest tightness and shortness of breath.    Cardiovascular:  Negative for chest pain.   Gastrointestinal:  Negative for abdominal distention, abdominal pain, anal bleeding, blood in stool, constipation, diarrhea, nausea, rectal pain and vomiting.     Objective:     Vital Signs (Most Recent):  Temp: 98.2 °F (36.8 °C) (05/07/25 1134)  Pulse: 65 (05/07/25 1421)  Resp: 18 (05/07/25 1134)  BP: (!) 111/59 (05/07/25 1134)  SpO2: 98 % (05/07/25 1134) Vital Signs (24h Range):  Temp:  [96.9 °F (36.1 °C)-98.2 °F (36.8 °C)] 98.2 °F (36.8 °C)  Pulse:   [65-87] 65  Resp:  [12-32] 18  SpO2:  [96 %-100 %] 98 %  BP: ()/(49-79) 111/59     Weight: 108.1 kg (238 lb 5.1 oz)  Body mass index is 31.44 kg/m².     Physical Exam  Vitals reviewed.   Constitutional:       General: He is not in acute distress.     Appearance: He is obese. He is not ill-appearing or toxic-appearing.   Cardiovascular:      Rate and Rhythm: Normal rate and regular rhythm.   Pulmonary:      Effort: Pulmonary effort is normal. No respiratory distress.   Abdominal:      General: There is no distension.      Palpations: Abdomen is soft.      Tenderness: There is no abdominal tenderness. There is no guarding or rebound.   Musculoskeletal:      Right lower leg: No edema.      Left lower leg: No edema.   Skin:     General: Skin is warm and dry.      Capillary Refill: Capillary refill takes less than 2 seconds.   Neurological:      Mental Status: He is alert. Mental status is at baseline.            I have reviewed all pertinent lab results within the past 24 hours.  CBC:   Recent Labs   Lab 05/07/25  0357   WBC 6.66   RBC 3.24*   HGB 10.1*   HCT 29.6*   *   MCV 91   MCH 31.2*   MCHC 34.1     CMP:   Recent Labs   Lab 05/07/25  0357   GLU 94   CALCIUM 8.0*   ALBUMIN 1.9*   PROT 6.2   *   K 3.5   CO2 28   CL 93*   BUN 38*   CREATININE 2.0*   ALKPHOS 92   ALT 9*   AST 26   BILITOT 3.3*       Significant Diagnostics:  I have reviewed all pertinent imaging results/findings within the past 24 hours.

## 2025-05-08 NOTE — PROCEDURES
[facility]   General Surgery Department  Procedure Note     SUMMARY     Date of Procedure: 5/7/2025    Procedure: Procedure(s) (LRB):  Removal of Tunneled Dialysis Catheter     Surgeon(s) and Role:  Kayla Foster MD     Pre-Operative Diagnosis: CKD IV     Post-Operative Diagnosis: Same         Anesthesia: Local    Findings:  Removal of intact right internal jugular TDC.      Indications for the Procedure:  69-year-old male with history of CKD who presents for TDC removal due to improved renal function.    Procedure in Detail:   Procedure in detail including risks and benefits including but not limited to hematoma and infection presented to patient.  Patient voiced understanding and elected to undergo tunneled dialysis catheter removal in clinic.  Patient identity verified by MRN and name immediately prior to the start of the procedure.  The right chest and catheter were prepped and draped in sterile fashion.  1% lidocaine without epi was infiltrated along the subcutaneous tract of the catheter.  An incision was created over the palpable cuff and carried down with blunt dissection.  The capsule overlying the cuff was dissected with a combination of blunt dissection and scalpel.  Once freed, the catheter was removed proximally from the internal jugular vein and direct pressure held for 2 minutes.  Patient tolerated this well with no sudden shortness of breath or altered mental status.  Once hemostasis was achieved the remainder of the adhesions distally toward the exit site were also broken up with a hemostat.  The catheter was then removed from the tunnel intact.  The counter incision was irrigated with normal saline and primarily closed with 4-0 Monocryl interrupted sutures.  Steri-Strips and sterile dressing were applied.  The exit site was copiously irrigated with normal saline and dressing applied with the goal of it healing by secondary intent.  All sharps were disposed of in the appropriate receptacle  and patient discharged from clinic in stable condition.    Complications: No    Estimated Blood Loss (EBL): Minimal              Specimens:   None           Condition: Good        Kayla Foster MD  General Surgery   [phone]

## 2025-05-08 NOTE — NURSING
Pt is stable for discharge. Family at bedside to bring pt home. PIV removed. Site of tunnel cath CDI with no signs of hematoma. VSS. Telemetry removed.

## 2025-05-08 NOTE — CONSULTS
[facility] Surg  General Surgery  Consult Note    Patient Name: Juan Carlos Yoo Sr.  MRN: [id number]  Code Status: Full Code  Admission Date: 5/4/2025  Hospital Length of Stay: 2 days  Attending Physician: Joao Villegas III, MD  Primary Care Provider: Joao Villegas III, MD    Patient information was obtained from patient, past medical records, ER records, and primary team.     Inpatient consult to General Surgery  Consult performed by: Kayla Foster MD  Consult ordered by: Joao Villegas III, MD        Subjective:     Principal Problem: Hyponatremia    History of Present Illness: 68yo male with history of CKD who presents for TDC removal.  Currently not requiring dialysis and request for removal given per IM and nephrology.     No current facility-administered medications on file prior to encounter.     Current Outpatient Medications on File Prior to Encounter   Medication Sig    famotidine (PEPCID) 20 MG tablet Take 1 tablet (20 mg total) by mouth once daily.    furosemide (LASIX) 80 MG tablet Take 1 tablet (80 mg total) by mouth once daily. (Once reach dry weight may need adjustment)    magnesium chloride (MAG 64) 64 mg TbEC Take 2 tablets (128 mg total) by mouth once daily.    potassium bicarbonate (K-LYTE) disintegrating tablet Take 1 tablet (25 mEq total) by mouth once daily.    rifAXIMin (XIFAXAN) 550 mg Tab Take 1 tablet (550 mg total) by mouth 2 (two) times daily.    spironolactone (ALDACTONE) 50 MG tablet Take 1 tablet (50 mg total) by mouth once daily.    tamsulosin (FLOMAX) 0.4 mg Cap Take 1 capsule (0.4 mg total) by mouth once daily.    [DISCONTINUED] lactulose (CHRONULAC) 20 gram/30 mL Soln Take 30 mLs (20 g total) by mouth 2 (two) times daily.    [DISCONTINUED] lactulose (CHRONULAC) 20 gram/30 mL Soln Take 45 mLs (30 g total) by mouth 2 (two) times daily.    [DISCONTINUED] metOLazone (ZAROXOLYN) 5 MG tablet Take 1 tablet (5 mg total) by mouth every other day. (May need to reduce once reach  "dry weight)    [DISCONTINUED] metoprolol tartrate (LOPRESSOR) 50 MG tablet Take 1 tablet (50 mg total) by mouth 2 (two) times daily.    [DISCONTINUED] thiamine 100 MG tablet Take 100 mg by mouth once daily.       Review of patient's allergies indicates:  No Known Allergies    Past Medical History:   Diagnosis Date    Atrial fibrillation     Bulging disc     Cirrhosis     Colon polyp 12/29/2017    Rpt 5 yrs    Encounter for blood transfusion     EV (esophageal varices)     Hypertension 03/30/2023    Renal disorder     Skin cancer 03/2017    'NOSE"    Thrombocytopenia      Past Surgical History:   Procedure Laterality Date    CATHETERIZATION OF BOTH LEFT AND RIGHT HEART N/A 02/08/2023    Procedure: CATHETERIZATION, HEART, BOTH LEFT AND RIGHT;  Surgeon: Flo Malone MD;  Location: [facility] CATH LAB;  Service: Cardiology;  Laterality: N/A;  low bleeding risk 1.0%    CHOLECYSTECTOMY      COLONOSCOPY      COLONOSCOPY N/A 12/29/2017    ta rpt 2022    CORONARY ANGIOGRAPHY N/A 02/08/2023    Procedure: ANGIOGRAM, CORONARY ARTERY;  Surgeon: Flo Malone MD;  Location: [facility] CATH LAB;  Service: Cardiology;  Laterality: N/A;    HERNIA REPAIR      INCISION AND DRAINAGE Right 02/27/2025    Procedure: Incision and Drainage, thigh;  Surgeon: Kayla Foster MD;  Location: [facility] OR;  Service: General;  Laterality: Right;  Plan to go first if pt has cardiac clearance - SB    SKIN BIOPSY  03/2017    TONSILLECTOMY      UPPER GASTROINTESTINAL ENDOSCOPY  2011    EV    UPPER GASTROINTESTINAL ENDOSCOPY  2016    VASECTOMY       Family History       Problem Relation (Age of Onset)    Alzheimer's disease Mother    Cancer Maternal Uncle    Heart disease Maternal Uncle    Hyperlipidemia Brother    No Known Problems Father    Ovarian cancer Sister          Tobacco Use    Smoking status: Never     Passive exposure: Never    Smokeless tobacco: Never   Substance and Sexual Activity    Alcohol use: Not Currently     Alcohol/week: 0.0 standard " drinks of alcohol    Drug use: No    Sexual activity: Not Currently     Review of Systems   Constitutional:  Negative for activity change, appetite change, chills and fever.   Respiratory:  Negative for chest tightness and shortness of breath.    Cardiovascular:  Negative for chest pain.   Gastrointestinal:  Negative for abdominal distention, abdominal pain, anal bleeding, blood in stool, constipation, diarrhea, nausea, rectal pain and vomiting.     Objective:     Vital Signs (Most Recent):  Temp: 98.2 °F (36.8 °C) (05/07/25 1134)  Pulse: 65 (05/07/25 1421)  Resp: 18 (05/07/25 1134)  BP: (!) 111/59 (05/07/25 1134)  SpO2: 98 % (05/07/25 1134) Vital Signs (24h Range):  Temp:  [96.9 °F (36.1 °C)-98.2 °F (36.8 °C)] 98.2 °F (36.8 °C)  Pulse:  [65-87] 65  Resp:  [12-32] 18  SpO2:  [96 %-100 %] 98 %  BP: ()/(49-79) 111/59     Weight: 108.1 kg (238 lb 5.1 oz)  Body mass index is 31.44 kg/m².     Physical Exam  Vitals reviewed.   Constitutional:       General: He is not in acute distress.     Appearance: He is obese. He is not ill-appearing or toxic-appearing.   Cardiovascular:      Rate and Rhythm: Normal rate and regular rhythm.   Pulmonary:      Effort: Pulmonary effort is normal. No respiratory distress.   Abdominal:      General: There is no distension.      Palpations: Abdomen is soft.      Tenderness: There is no abdominal tenderness. There is no guarding or rebound.   Musculoskeletal:      Right lower leg: No edema.      Left lower leg: No edema.   Skin:     General: Skin is warm and dry.      Capillary Refill: Capillary refill takes less than 2 seconds.   Neurological:      Mental Status: He is alert. Mental status is at baseline.            I have reviewed all pertinent lab results within the past 24 hours.  CBC:   Recent Labs   Lab 05/07/25  0357   WBC 6.66   RBC 3.24*   HGB 10.1*   HCT 29.6*   *   MCV 91   MCH 31.2*   MCHC 34.1     CMP:   Recent Labs   Lab 05/07/25  0357   GLU 94   CALCIUM 8.0*    ALBUMIN 1.9*   PROT 6.2   *   K 3.5   CO2 28   CL 93*   BUN 38*   CREATININE 2.0*   ALKPHOS 92   ALT 9*   AST 26   BILITOT 3.3*       Significant Diagnostics:  I have reviewed all pertinent imaging results/findings within the past 24 hours.    Assessment/Plan:     CKD (chronic kidney disease) stage 4, GFR 15-29 ml/min  HD catheter removed - pt tolerated well  Follow up in GS clinic in two weeks   Nursing staff instructed to check site for hematoma prior to discharge later this evening      VTE Risk Mitigation (From admission, onward)           Ordered     IP VTE HIGH RISK PATIENT  Once         05/04/25 1645     Place sequential compression device  Until discontinued         05/04/25 1645                    Thank you for your consult. I will follow-up with patient. Please contact us if you have any additional questions.    Kayla Foster MD  General Surgery  [facility]

## 2025-05-09 ENCOUNTER — PATIENT OUTREACH (OUTPATIENT)
Dept: ADMINISTRATIVE | Facility: CLINIC | Age: 70
End: 2025-05-09
Payer: COMMERCIAL

## 2025-05-09 NOTE — PROGRESS NOTES
C3 nurse spoke with Juan Carlos Yoo Sr.  for a TCC post hospital discharge follow up call. The patient has a scheduled HOSFU appointment with Joao Vilelgas III, MD on 5/12/2025 @ 11:30 AM.

## 2025-05-12 ENCOUNTER — LAB VISIT (OUTPATIENT)
Dept: LAB | Facility: HOSPITAL | Age: 70
End: 2025-05-12
Attending: INTERNAL MEDICINE
Payer: COMMERCIAL

## 2025-05-12 DIAGNOSIS — K76.82 HEPATIC ENCEPHALOPATHY: ICD-10-CM

## 2025-05-12 DIAGNOSIS — K70.31 ALCOHOLIC CIRRHOSIS OF LIVER WITH ASCITES: ICD-10-CM

## 2025-05-12 PROBLEM — R53.81 MALAISE: Status: ACTIVE | Noted: 2024-12-27

## 2025-05-12 PROBLEM — M62.81 MUSCLE WEAKNESS: Status: ACTIVE | Noted: 2024-12-30

## 2025-05-12 PROBLEM — R27.9 COORDINATION PROBLEM: Status: ACTIVE | Noted: 2024-12-30

## 2025-05-12 PROBLEM — A04.72 CLOSTRIDIUM DIFFICILE COLITIS: Status: ACTIVE | Noted: 2022-01-31

## 2025-05-12 PROBLEM — E44.0 MODERATE PROTEIN-CALORIE MALNUTRITION (WEIGHT FOR AGE 60-74% OF STANDARD): Status: ACTIVE | Noted: 2024-12-27

## 2025-05-12 PROBLEM — L03.90 CELLULITIS: Status: RESOLVED | Noted: 2024-12-28 | Resolved: 2025-05-12

## 2025-05-12 PROBLEM — M62.82 DISEASE CHARACTERIZED BY DESTRUCTION OF SKELETAL MUSCLE: Status: ACTIVE | Noted: 2022-01-24

## 2025-05-12 PROBLEM — N18.31 CHRONIC KIDNEY DISEASE, STAGE 3A: Status: ACTIVE | Noted: 2024-12-23

## 2025-05-12 PROBLEM — B34.8 RHINOVIRUS INFECTION: Status: RESOLVED | Noted: 2025-02-22 | Resolved: 2025-05-12

## 2025-05-12 PROBLEM — T78.2XXA ANAPHYLACTIC SHOCK, UNSPECIFIED, INITIAL ENCOUNTER: Status: ACTIVE | Noted: 2024-12-30

## 2025-05-12 PROBLEM — L03.115 CELLULITIS OF RIGHT LOWER EXTREMITY: Status: RESOLVED | Noted: 2025-02-23 | Resolved: 2025-05-12

## 2025-05-12 LAB
ABSOLUTE EOSINOPHIL (OHS): 0.2 K/UL
ABSOLUTE MONOCYTE (OHS): 0.79 K/UL (ref 0.3–1)
ABSOLUTE NEUTROPHIL COUNT (OHS): 3.08 K/UL (ref 1.8–7.7)
ALBUMIN SERPL BCP-MCNC: 2.3 G/DL (ref 3.5–5.2)
ALP SERPL-CCNC: 110 UNIT/L (ref 40–150)
ALT SERPL W/O P-5'-P-CCNC: 10 UNIT/L (ref 10–44)
ANION GAP (OHS): 8 MMOL/L (ref 8–16)
AST SERPL-CCNC: 32 UNIT/L (ref 11–45)
BASOPHILS # BLD AUTO: 0.05 K/UL
BASOPHILS NFR BLD AUTO: 0.9 %
BILIRUB SERPL-MCNC: 3.4 MG/DL (ref 0.1–1)
BUN SERPL-MCNC: 38 MG/DL (ref 8–23)
CALCIUM SERPL-MCNC: 8.2 MG/DL (ref 8.7–10.5)
CHLORIDE SERPL-SCNC: 93 MMOL/L (ref 95–110)
CO2 SERPL-SCNC: 30 MMOL/L (ref 23–29)
CREAT SERPL-MCNC: 2.1 MG/DL (ref 0.5–1.4)
ERYTHROCYTE [DISTWIDTH] IN BLOOD BY AUTOMATED COUNT: 15.8 % (ref 11.5–14.5)
GFR SERPLBLD CREATININE-BSD FMLA CKD-EPI: 33 ML/MIN/1.73/M2
GLUCOSE SERPL-MCNC: 88 MG/DL (ref 70–110)
HCT VFR BLD AUTO: 31.9 % (ref 40–54)
HGB BLD-MCNC: 10.7 GM/DL (ref 14–18)
IMM GRANULOCYTES # BLD AUTO: 0.06 K/UL (ref 0–0.04)
IMM GRANULOCYTES NFR BLD AUTO: 1.1 % (ref 0–0.5)
INR PPP: 1.3 (ref 0.8–1.2)
LYMPHOCYTES # BLD AUTO: 1.21 K/UL (ref 1–4.8)
MAGNESIUM SERPL-MCNC: 1.9 MG/DL (ref 1.6–2.6)
MCH RBC QN AUTO: 31.5 PG (ref 27–31)
MCHC RBC AUTO-ENTMCNC: 33.5 G/DL (ref 32–36)
MCV RBC AUTO: 94 FL (ref 82–98)
NUCLEATED RBC (/100WBC) (OHS): 0 /100 WBC
PLATELET # BLD AUTO: 119 K/UL (ref 150–450)
PMV BLD AUTO: 11.6 FL (ref 9.2–12.9)
POTASSIUM SERPL-SCNC: 4.2 MMOL/L (ref 3.5–5.1)
PROT SERPL-MCNC: 7 GM/DL (ref 6–8.4)
PROTHROMBIN TIME: 14.7 SECONDS (ref 9–12.5)
RBC # BLD AUTO: 3.4 M/UL (ref 4.6–6.2)
RELATIVE EOSINOPHIL (OHS): 3.7 %
RELATIVE LYMPHOCYTE (OHS): 22.4 % (ref 18–48)
RELATIVE MONOCYTE (OHS): 14.7 % (ref 4–15)
RELATIVE NEUTROPHIL (OHS): 57.2 % (ref 38–73)
SODIUM SERPL-SCNC: 131 MMOL/L (ref 136–145)
WBC # BLD AUTO: 5.39 K/UL (ref 3.9–12.7)

## 2025-05-12 PROCEDURE — 83735 ASSAY OF MAGNESIUM: CPT

## 2025-05-12 PROCEDURE — 82247 BILIRUBIN TOTAL: CPT

## 2025-05-12 PROCEDURE — 85610 PROTHROMBIN TIME: CPT

## 2025-05-12 PROCEDURE — 85025 COMPLETE CBC W/AUTO DIFF WBC: CPT

## 2025-05-12 PROCEDURE — 36415 COLL VENOUS BLD VENIPUNCTURE: CPT

## 2025-07-01 ENCOUNTER — TELEPHONE (OUTPATIENT)
Dept: ENDOSCOPY | Facility: HOSPITAL | Age: 70
End: 2025-07-01
Payer: COMMERCIAL

## 2025-07-01 ENCOUNTER — CLINICAL SUPPORT (OUTPATIENT)
Dept: ENDOSCOPY | Facility: HOSPITAL | Age: 70
End: 2025-07-01
Attending: STUDENT IN AN ORGANIZED HEALTH CARE EDUCATION/TRAINING PROGRAM
Payer: COMMERCIAL

## 2025-07-01 VITALS — WEIGHT: 280 LBS | HEIGHT: 73 IN | BODY MASS INDEX: 37.11 KG/M2

## 2025-07-01 DIAGNOSIS — K70.31 ALCOHOLIC CIRRHOSIS OF LIVER WITH ASCITES: Primary | ICD-10-CM

## 2025-07-01 DIAGNOSIS — K70.31 ALCOHOLIC CIRRHOSIS OF LIVER WITH ASCITES: ICD-10-CM

## 2025-07-01 DIAGNOSIS — E87.6 HYPOKALEMIA: Primary | ICD-10-CM

## 2025-07-01 RX ORDER — POTASSIUM BICARBONATE 977.5 MG/1
25 TABLET, EFFERVESCENT ORAL
Qty: 90 TABLET | Refills: 3 | Status: SHIPPED | OUTPATIENT
Start: 2025-07-01

## 2025-07-01 NOTE — PLAN OF CARE
Patient is scheduled for a Colonoscopy on 08/13/2025 with Dr. MAY Bautista  Referral for procedure from PAT appointment

## 2025-07-01 NOTE — PATIENT INSTRUCTIONS
EGD Procedure Prep Instructions      Date of procedure: 08/13/2025 Arrive at: 12:00PM      Location of Department: [address] [place] [address]   Stop in the hospital lobby on the 1st floor to get registered, then take the hospital elevators to the 2nd floor waiting room    How to prep:    Day Before Procedure: 08/12/2025     You may have a light evening meal.   No solid food after 7:00 pm.   Continue drinking clear liquids.       Day of the Procedure:  08/13/2025     You may have water/clear liquids until 4 hours before your procedure or as directed by the scheduling nurse  08:00AM. See below for list.    What You CANNOT do:   Do not drink milk or anything colored red.  Do not drink alcohol.  No gum chewing or candy morning of procedure.    Liquids That Are OK to Drink:   Water  Sports drinks (Gatorade, Power-Aid)  Coffee or tea (no cream or nondairy creamer)  Clear juices without pulp (apple, white grape)  Gelatin desserts (no fruit or toppings)  Clear soda (sprite, coke, ginger ale)  Chicken broth (until 12 midnight the night before procedure)      Comments:        IMPORTANT INFORMATION TO KNOW BEFORE YOUR PROCEDURE    [facility]     If your procedure requires the administration of anesthesia, it is necessary for a responsible adult to drive you home. The designated adult is strongly encouraged to remain in the endoscopy area until the patient is discharged. (Medical Transportation, Uber, Lyft, Taxi, etc. may ONLY be used if a responsible adult is present to accompany you home. The responsible adult CANNOT be the  of the service.)     person must be available to return to pick you up within 15 minutes of being notified of discharge.     Please bring a picture ID, insurance card, and copayment.     Take Medications as directed below:      If you begin taking any blood thinning medications, injectable weight loss/diabetes medications (other than insulin) ,  Adipex (Phentermine) , please contact the endoscopy scheduling department listed below as soon as possible.    If you are diabetic see the attached instruction sheet regarding your medication.     If you take HEART, BLOOD PRESSURE, SEIZURE, PAIN, LUNG (including inhalers/nebulizers), ANTI-REJECTION (transplant patients), or PSYCHIATRIC medications, please take at your regular times with a sip of water or as directed by the scheduling nurse.     Important contact information:    Endoscopy [phone] Hours of operation Monday-Friday 8:00-4:30pm.    Questions about insurance or financial obligations call [phone] or [phone].    If you have questions regarding the prep or need to reschedule, please call [phone]. After hours questions requiring immediate assistance, contact [facility] nurse line at [phone] or [phone].   NOTE:     On occasion, unforeseen circumstances may cause a delay in your procedure start time. We respect your time and appreciate your patience during these circumstances.      Comments:

## 2025-07-01 NOTE — TELEPHONE ENCOUNTER
Contacted the patient to schedule an endoscopy procedure(s) Upper Endoscopy (EGD) . Patient will like to speak to his PCP first before scheduling EGD. Informed patient I will let the ordering provider aware. Patient states he will like a message sent to his PCP also.

## 2025-07-09 DIAGNOSIS — I27.20 PULMONARY HYPERTENSION: Primary | ICD-10-CM

## 2025-07-10 RX ORDER — METOPROLOL TARTRATE 25 MG/1
25 TABLET, FILM COATED ORAL 2 TIMES DAILY
Qty: 180 TABLET | Refills: 3 | Status: SHIPPED | OUTPATIENT
Start: 2025-07-10

## 2025-07-15 ENCOUNTER — RESULTS FOLLOW-UP (OUTPATIENT)
Dept: HEPATOLOGY | Facility: HOSPITAL | Age: 70
End: 2025-07-15
Payer: COMMERCIAL

## 2025-07-15 ENCOUNTER — LAB VISIT (OUTPATIENT)
Dept: LAB | Facility: HOSPITAL | Age: 70
End: 2025-07-15
Attending: INTERNAL MEDICINE
Payer: COMMERCIAL

## 2025-07-15 DIAGNOSIS — E87.1 HYPONATREMIA: ICD-10-CM

## 2025-07-15 DIAGNOSIS — K76.82 HEPATIC ENCEPHALOPATHY: ICD-10-CM

## 2025-07-15 DIAGNOSIS — E83.42 HYPOMAGNESEMIA: ICD-10-CM

## 2025-07-15 DIAGNOSIS — K70.31 ALCOHOLIC CIRRHOSIS OF LIVER WITH ASCITES: ICD-10-CM

## 2025-07-15 PROBLEM — Z99.2 DEPENDENCE ON RENAL DIALYSIS: Status: RESOLVED | Noted: 2024-12-23 | Resolved: 2025-07-15

## 2025-07-15 PROBLEM — N18.6 ESRD ON DIALYSIS: Status: ACTIVE | Noted: 2025-03-11

## 2025-07-15 PROBLEM — N18.6 ESRD ON DIALYSIS: Status: RESOLVED | Noted: 2025-03-11 | Resolved: 2025-07-15

## 2025-07-15 PROBLEM — Z99.2 ESRD ON DIALYSIS: Status: ACTIVE | Noted: 2025-03-11

## 2025-07-15 PROBLEM — N18.6 END-STAGE RENAL DISEASE: Status: RESOLVED | Noted: 2024-11-20 | Resolved: 2025-07-15

## 2025-07-15 PROBLEM — Z99.2 ESRD ON DIALYSIS: Status: RESOLVED | Noted: 2025-03-11 | Resolved: 2025-07-15

## 2025-07-15 PROBLEM — N18.6 END-STAGE RENAL DISEASE: Status: ACTIVE | Noted: 2024-11-20

## 2025-07-15 PROBLEM — Z99.2 DEPENDENCE ON RENAL DIALYSIS: Status: ACTIVE | Noted: 2024-12-23

## 2025-07-15 LAB
ABSOLUTE EOSINOPHIL (OHS): 0.17 K/UL
ABSOLUTE MONOCYTE (OHS): 0.77 K/UL (ref 0.3–1)
ABSOLUTE NEUTROPHIL COUNT (OHS): 2.34 K/UL (ref 1.8–7.7)
ALBUMIN SERPL BCP-MCNC: 2.4 G/DL (ref 3.5–5.2)
ALP SERPL-CCNC: 59 UNIT/L (ref 40–150)
ALT SERPL W/O P-5'-P-CCNC: 8 UNIT/L (ref 10–44)
ANION GAP (OHS): 7 MMOL/L (ref 8–16)
AST SERPL-CCNC: 30 UNIT/L (ref 11–45)
BASOPHILS # BLD AUTO: 0.06 K/UL
BASOPHILS NFR BLD AUTO: 1.4 %
BILIRUB SERPL-MCNC: 3.9 MG/DL (ref 0.1–1)
BUN SERPL-MCNC: 18 MG/DL (ref 8–23)
CALCIUM SERPL-MCNC: 8.1 MG/DL (ref 8.7–10.5)
CHLORIDE SERPL-SCNC: 97 MMOL/L (ref 95–110)
CO2 SERPL-SCNC: 35 MMOL/L (ref 23–29)
CREAT SERPL-MCNC: 1.6 MG/DL (ref 0.5–1.4)
ERYTHROCYTE [DISTWIDTH] IN BLOOD BY AUTOMATED COUNT: 14.7 % (ref 11.5–14.5)
GFR SERPLBLD CREATININE-BSD FMLA CKD-EPI: 46 ML/MIN/1.73/M2
GLUCOSE SERPL-MCNC: 95 MG/DL (ref 70–110)
HCT VFR BLD AUTO: 31.8 % (ref 40–54)
HGB BLD-MCNC: 10.4 GM/DL (ref 14–18)
IMM GRANULOCYTES # BLD AUTO: 0.01 K/UL (ref 0–0.04)
IMM GRANULOCYTES NFR BLD AUTO: 0.2 % (ref 0–0.5)
INR PPP: 1.5 (ref 0.8–1.2)
LYMPHOCYTES # BLD AUTO: 1 K/UL (ref 1–4.8)
MAGNESIUM SERPL-MCNC: 1.6 MG/DL (ref 1.6–2.6)
MCH RBC QN AUTO: 30.9 PG (ref 27–31)
MCHC RBC AUTO-ENTMCNC: 32.7 G/DL (ref 32–36)
MCV RBC AUTO: 94 FL (ref 82–98)
NUCLEATED RBC (/100WBC) (OHS): 0 /100 WBC
PHOSPHATE SERPL-MCNC: 3 MG/DL (ref 2.7–4.5)
PLATELET # BLD AUTO: 106 K/UL (ref 150–450)
PMV BLD AUTO: 11.3 FL (ref 9.2–12.9)
POTASSIUM SERPL-SCNC: 3.7 MMOL/L (ref 3.5–5.1)
PROT SERPL-MCNC: 6.8 GM/DL (ref 6–8.4)
PROTHROMBIN TIME: 15.9 SECONDS (ref 9–12.5)
RBC # BLD AUTO: 3.37 M/UL (ref 4.6–6.2)
RELATIVE EOSINOPHIL (OHS): 3.9 %
RELATIVE LYMPHOCYTE (OHS): 23 % (ref 18–48)
RELATIVE MONOCYTE (OHS): 17.7 % (ref 4–15)
RELATIVE NEUTROPHIL (OHS): 53.8 % (ref 38–73)
SODIUM SERPL-SCNC: 139 MMOL/L (ref 136–145)
TSH SERPL-ACNC: 2.79 UIU/ML (ref 0.4–4)
WBC # BLD AUTO: 4.35 K/UL (ref 3.9–12.7)

## 2025-07-15 PROCEDURE — 84100 ASSAY OF PHOSPHORUS: CPT

## 2025-07-15 PROCEDURE — 84443 ASSAY THYROID STIM HORMONE: CPT

## 2025-07-15 PROCEDURE — 82947 ASSAY GLUCOSE BLOOD QUANT: CPT

## 2025-07-15 PROCEDURE — 36415 COLL VENOUS BLD VENIPUNCTURE: CPT

## 2025-07-15 PROCEDURE — 85610 PROTHROMBIN TIME: CPT

## 2025-07-15 PROCEDURE — 83735 ASSAY OF MAGNESIUM: CPT

## 2025-07-15 PROCEDURE — 85025 COMPLETE CBC W/AUTO DIFF WBC: CPT

## 2025-07-16 PROBLEM — A04.72 CLOSTRIDIUM DIFFICILE COLITIS: Status: RESOLVED | Noted: 2022-01-31 | Resolved: 2025-07-16

## 2025-07-16 PROBLEM — E27.40 ADRENAL INSUFFICIENCY: Status: RESOLVED | Noted: 2024-12-14 | Resolved: 2025-07-16

## 2025-08-11 ENCOUNTER — TELEPHONE (OUTPATIENT)
Dept: ENDOSCOPY | Facility: HOSPITAL | Age: 70
End: 2025-08-11
Payer: COMMERCIAL

## 2025-08-14 ENCOUNTER — TELEPHONE (OUTPATIENT)
Dept: ENDOSCOPY | Facility: HOSPITAL | Age: 70
End: 2025-08-14
Payer: COMMERCIAL

## 2025-08-14 DIAGNOSIS — K70.31 ALCOHOLIC CIRRHOSIS OF LIVER WITH ASCITES: Primary | ICD-10-CM

## 2025-08-27 ENCOUNTER — HOSPITAL ENCOUNTER (OUTPATIENT)
Facility: HOSPITAL | Age: 70
Discharge: HOME | End: 2025-08-27
Attending: INTERNAL MEDICINE | Admitting: INTERNAL MEDICINE
Payer: COMMERCIAL

## 2025-08-27 ENCOUNTER — ANESTHESIA EVENT (OUTPATIENT)
Dept: ENDOSCOPY | Facility: HOSPITAL | Age: 70
End: 2025-08-27
Payer: COMMERCIAL

## 2025-08-27 ENCOUNTER — ANESTHESIA (OUTPATIENT)
Dept: ENDOSCOPY | Facility: HOSPITAL | Age: 70
End: 2025-08-27
Payer: COMMERCIAL

## 2025-08-27 VITALS
TEMPERATURE: 98 F | HEART RATE: 116 BPM | HEIGHT: 73 IN | RESPIRATION RATE: 16 BRPM | DIASTOLIC BLOOD PRESSURE: 62 MMHG | WEIGHT: 267 LBS | SYSTOLIC BLOOD PRESSURE: 92 MMHG | BODY MASS INDEX: 35.39 KG/M2 | OXYGEN SATURATION: 97 %

## 2025-08-27 DIAGNOSIS — I49.9 ARRHYTHMIA: ICD-10-CM

## 2025-08-27 DIAGNOSIS — K74.69 OTHER CIRRHOSIS OF LIVER: Primary | ICD-10-CM

## 2025-08-27 DIAGNOSIS — I85.00 ESOPHAGEAL VARICES: ICD-10-CM

## 2025-08-27 LAB
OHS QRS DURATION: 104 MS
OHS QTC CALCULATION: 535 MS

## 2025-08-27 PROCEDURE — 43235 EGD DIAGNOSTIC BRUSH WASH: CPT | Mod: ,,, | Performed by: INTERNAL MEDICINE

## 2025-08-27 PROCEDURE — 25000003 PHARM REV CODE 250

## 2025-08-27 PROCEDURE — 93010 ELECTROCARDIOGRAM REPORT: CPT | Mod: ,,, | Performed by: INTERNAL MEDICINE

## 2025-08-27 PROCEDURE — 43235 EGD DIAGNOSTIC BRUSH WASH: CPT | Performed by: INTERNAL MEDICINE

## 2025-08-27 PROCEDURE — 37000008 HC ANESTHESIA 1ST 15 MINUTES: Performed by: INTERNAL MEDICINE

## 2025-08-27 PROCEDURE — 37000009 HC ANESTHESIA EA ADD 15 MINS: Performed by: INTERNAL MEDICINE

## 2025-08-27 PROCEDURE — 63600175 PHARM REV CODE 636 W HCPCS

## 2025-08-27 PROCEDURE — 93005 ELECTROCARDIOGRAM TRACING: CPT

## 2025-08-27 RX ORDER — PROPOFOL 10 MG/ML
VIAL (ML) INTRAVENOUS
Status: DISCONTINUED | OUTPATIENT
Start: 2025-08-27 | End: 2025-08-27

## 2025-08-27 RX ORDER — PHENYLEPHRINE HYDROCHLORIDE 10 MG/ML
INJECTION INTRAVENOUS
Status: DISCONTINUED | OUTPATIENT
Start: 2025-08-27 | End: 2025-08-27

## 2025-08-27 RX ORDER — METOPROLOL TARTRATE 1 MG/ML
2.5 INJECTION, SOLUTION INTRAVENOUS ONCE
Status: DISCONTINUED | OUTPATIENT
Start: 2025-08-27 | End: 2025-08-27 | Stop reason: HOSPADM

## 2025-08-27 RX ORDER — ESMOLOL HYDROCHLORIDE 10 MG/ML
INJECTION INTRAVENOUS
Status: DISCONTINUED | OUTPATIENT
Start: 2025-08-27 | End: 2025-08-27

## 2025-08-27 RX ORDER — LIDOCAINE HYDROCHLORIDE 20 MG/ML
INJECTION INTRAVENOUS
Status: DISCONTINUED | OUTPATIENT
Start: 2025-08-27 | End: 2025-08-27

## 2025-08-27 RX ORDER — SODIUM CHLORIDE 9 MG/ML
INJECTION, SOLUTION INTRAVENOUS CONTINUOUS
Status: DISCONTINUED | OUTPATIENT
Start: 2025-08-27 | End: 2025-08-27 | Stop reason: HOSPADM

## 2025-08-27 RX ORDER — VASOPRESSIN 20 [USP'U]/ML
INJECTION, SOLUTION INTRAMUSCULAR; SUBCUTANEOUS
Status: DISCONTINUED | OUTPATIENT
Start: 2025-08-27 | End: 2025-08-27

## 2025-08-27 RX ORDER — METOPROLOL TARTRATE 1 MG/ML
5 INJECTION, SOLUTION INTRAVENOUS ONCE
Status: DISCONTINUED | OUTPATIENT
Start: 2025-08-27 | End: 2025-08-27 | Stop reason: HOSPADM

## 2025-08-27 RX ADMIN — VASOPRESSIN 2 UNITS: 20 INJECTION INTRAVENOUS at 01:08

## 2025-08-27 RX ADMIN — GLYCOPYRROLATE 0.2 MG: 0.2 INJECTION, SOLUTION INTRAMUSCULAR; INTRAVENOUS at 01:08

## 2025-08-27 RX ADMIN — PROPOFOL 30 MG: 10 INJECTION, EMULSION INTRAVENOUS at 01:08

## 2025-08-27 RX ADMIN — SODIUM CHLORIDE: 0.9 INJECTION, SOLUTION INTRAVENOUS at 01:08

## 2025-08-27 RX ADMIN — PHENYLEPHRINE HYDROCHLORIDE 200 MCG: 10 INJECTION INTRAVENOUS at 01:08

## 2025-08-27 RX ADMIN — PHENYLEPHRINE HYDROCHLORIDE 100 MCG: 10 INJECTION INTRAVENOUS at 01:08

## 2025-08-27 RX ADMIN — LIDOCAINE HYDROCHLORIDE 100 MG: 20 INJECTION INTRAVENOUS at 01:08

## 2025-08-27 RX ADMIN — PROPOFOL 200 MCG/KG/MIN: 10 INJECTION, EMULSION INTRAVENOUS at 01:08

## 2025-08-27 RX ADMIN — ESMOLOL HYDROCHLORIDE 10 MG: 100 INJECTION, SOLUTION INTRAVENOUS at 01:08

## 2025-08-27 RX ADMIN — PROPOFOL 70 MG: 10 INJECTION, EMULSION INTRAVENOUS at 01:08

## (undated) DEVICE — KIT PROBE COVER WITH GEL

## (undated) DEVICE — GOWN NONREINF SET-IN SLV XL

## (undated) DEVICE — LINER GLOVE POWDERFREE SZ 6.5

## (undated) DEVICE — DEVICE PERCLOSE SUT CLSR 6FR

## (undated) DEVICE — KIT MNTR POLE MT DUL 12&48 MAC

## (undated) DEVICE — STOPCOCK 3-WAY

## (undated) DEVICE — GUIDEWIRE STD .035X180CM ANG

## (undated) DEVICE — SOL IRRI STRL WATER 1000ML

## (undated) DEVICE — GLOVE SURG ULTRA TOUCH 7

## (undated) DEVICE — GUIDEWIRE SUPRA CORE 035 190CM

## (undated) DEVICE — TRAY CATH LAB OMC

## (undated) DEVICE — LINER GLOVE POWDERFREE SZ 7

## (undated) DEVICE — KIT CUSTOM MANIFOLD

## (undated) DEVICE — PAD DEFIB CADENCE ADULT R2

## (undated) DEVICE — APPLICATOR CHLORAPREP ORN 26ML

## (undated) DEVICE — LINE 60IN PRESSURE MON.

## (undated) DEVICE — SOL NORMAL USPCA 0.9%

## (undated) DEVICE — KIT MICROINTRO 4F .018X40X7CM

## (undated) DEVICE — PROTECTION STATION PLUS

## (undated) DEVICE — PENCIL SMK EVAC CONNECTOR 10FT

## (undated) DEVICE — OMNIPAQUE 350 200ML

## (undated) DEVICE — DRAIN PENRS SIL STRL .25X18IN

## (undated) DEVICE — SPIKE CONTRAST CONTROLLER

## (undated) DEVICE — LINER SUCTION CANNISTER REGUGA

## (undated) DEVICE — SPONGE COTTON TRAY 4X4IN

## (undated) DEVICE — BANDAGE MATRIX HK LOOP 6IN 5YD

## (undated) DEVICE — CATH WEDGE 6FR X 110CM

## (undated) DEVICE — Device

## (undated) DEVICE — CATH INFINITI JUDKINS JR4

## (undated) DEVICE — CATH JL5 4FR INFINITY

## (undated) DEVICE — ELECTRODE REM PLYHSV RETURN 9

## (undated) DEVICE — UNDERPAD DELUXE FLUFF 30X30IN

## (undated) DEVICE — SUT SILK 0 SH 30IN BLK BR

## (undated) DEVICE — STRAP KNEE & BODY DISP 4X34IN

## (undated) DEVICE — SHEATH INTRODUCER 6FR 11CM

## (undated) DEVICE — GLOVE SURGICAL LATEX SZ 6.5

## (undated) DEVICE — BANDAGE GAUZE COT STRL 4.5X4.1

## (undated) DEVICE — SOL NACL IRR 1000ML BTL

## (undated) DEVICE — PAD ABDOMINAL STERILE 5X9IN